# Patient Record
Sex: MALE | Race: OTHER | NOT HISPANIC OR LATINO
[De-identification: names, ages, dates, MRNs, and addresses within clinical notes are randomized per-mention and may not be internally consistent; named-entity substitution may affect disease eponyms.]

---

## 2017-01-03 ENCOUNTER — RX RENEWAL (OUTPATIENT)
Age: 62
End: 2017-01-03

## 2017-01-03 ENCOUNTER — APPOINTMENT (OUTPATIENT)
Age: 62
End: 2017-01-03

## 2017-01-03 ENCOUNTER — APPOINTMENT (OUTPATIENT)
Dept: HEART AND VASCULAR | Facility: CLINIC | Age: 62
End: 2017-01-03

## 2017-01-03 VITALS
HEART RATE: 68 BPM | SYSTOLIC BLOOD PRESSURE: 110 MMHG | BODY MASS INDEX: 24.14 KG/M2 | HEIGHT: 69 IN | DIASTOLIC BLOOD PRESSURE: 76 MMHG | WEIGHT: 163 LBS

## 2017-04-11 ENCOUNTER — APPOINTMENT (OUTPATIENT)
Dept: HEART AND VASCULAR | Facility: CLINIC | Age: 62
End: 2017-04-11

## 2017-06-16 ENCOUNTER — APPOINTMENT (OUTPATIENT)
Dept: HEART AND VASCULAR | Facility: CLINIC | Age: 62
End: 2017-06-16

## 2017-06-16 VITALS
HEIGHT: 69 IN | SYSTOLIC BLOOD PRESSURE: 135 MMHG | HEART RATE: 80 BPM | WEIGHT: 163 LBS | DIASTOLIC BLOOD PRESSURE: 80 MMHG | BODY MASS INDEX: 24.14 KG/M2

## 2018-02-06 ENCOUNTER — APPOINTMENT (OUTPATIENT)
Dept: HEART AND VASCULAR | Facility: CLINIC | Age: 63
End: 2018-02-06
Payer: COMMERCIAL

## 2018-02-06 VITALS
HEART RATE: 84 BPM | SYSTOLIC BLOOD PRESSURE: 150 MMHG | WEIGHT: 168 LBS | BODY MASS INDEX: 24.88 KG/M2 | DIASTOLIC BLOOD PRESSURE: 94 MMHG | HEIGHT: 69 IN

## 2018-02-06 PROCEDURE — 93284 PRGRMG EVAL IMPLANTABLE DFB: CPT

## 2018-08-27 ENCOUNTER — APPOINTMENT (OUTPATIENT)
Dept: HEART AND VASCULAR | Facility: CLINIC | Age: 63
End: 2018-08-27
Payer: COMMERCIAL

## 2018-08-27 VITALS
DIASTOLIC BLOOD PRESSURE: 65 MMHG | SYSTOLIC BLOOD PRESSURE: 106 MMHG | BODY MASS INDEX: 24.88 KG/M2 | HEART RATE: 68 BPM | WEIGHT: 168 LBS | HEIGHT: 69 IN

## 2018-08-27 PROCEDURE — 93284 PRGRMG EVAL IMPLANTABLE DFB: CPT

## 2019-03-05 ENCOUNTER — APPOINTMENT (OUTPATIENT)
Dept: HEART AND VASCULAR | Facility: CLINIC | Age: 64
End: 2019-03-05
Payer: COMMERCIAL

## 2019-03-05 VITALS
HEIGHT: 69 IN | BODY MASS INDEX: 25.18 KG/M2 | DIASTOLIC BLOOD PRESSURE: 72 MMHG | SYSTOLIC BLOOD PRESSURE: 107 MMHG | WEIGHT: 170 LBS | HEART RATE: 82 BPM

## 2019-03-05 PROCEDURE — 99212 OFFICE O/P EST SF 10 MIN: CPT | Mod: 25

## 2019-03-05 PROCEDURE — 93284 PRGRMG EVAL IMPLANTABLE DFB: CPT

## 2019-03-05 NOTE — HISTORY OF PRESENT ILLNESS
[de-identified] : 64 yo man with complete heart block S/P PPM implant 3/2006. He had "heart attacks" in the interim with angiography at [facility] without obstructive disease. EF is in the 30% range by ECHO. The device reached ART sometime before January 2016 based upon available ECG and at present was not able to be interrogated; there is no evidence for pacemaker function on ECG. He had a ventricular escape in the 30's.  He underwent upgrade of his pacemaker to a BiV ICD for primary prevention given his low EF from ischemic/non-ischemic cardiomyopathy and need for 100% ventricular pacing. Left arm venogram performed showed patent left upper extremity venous system.\par \par He presents for routine device check today. He states he feels well and offers no device related complaints. Stopped drinking two weeks ago, prior to that 4 beers/day.  Reports his general cardiologist, Dr. Farooq recently stopped his ACE-I.\par

## 2019-03-05 NOTE — PHYSICAL EXAM
[General Appearance - Well Developed] : well developed [Normal Appearance] : normal appearance [Well Groomed] : well groomed [General Appearance - Well Nourished] : well nourished [No Deformities] : no deformities [General Appearance - In No Acute Distress] : no acute distress [Heart Rate And Rhythm] : heart rate and rhythm were normal [Heart Sounds] : normal S1 and S2 [Murmurs] : no murmurs present [Respiration, Rhythm And Depth] : normal respiratory rhythm and effort [Exaggerated Use Of Accessory Muscles For Inspiration] : no accessory muscle use [Auscultation Breath Sounds / Voice Sounds] : lungs were clear to auscultation bilaterally [Left Infraclavicular] : left infraclavicular area [Clean] : clean [Dry] : dry [Well-Healed] : well-healed [Palpable Crepitus] : no palpable crepitus [Bleeding] : no active bleeding [Foul Odor] : no foul smell [Purulent Drainage] : no purulent drainage [Serosanguineous Drainage] : no serosanquineous drainage [Serous Drainage] : no serous drainage [Erythema] : not erythematous [Warm] : not warm [Tender] : not tender [Indurated] : not indurated [Fluctuant] : not fluctuant [Abdomen Soft] : soft [Abdomen Tenderness] : non-tender [Abdomen Mass (___ Cm)] : no abdominal mass palpated [Nail Clubbing] : no clubbing of the fingernails [Cyanosis, Localized] : no localized cyanosis [Petechial Hemorrhages (___cm)] : no petechial hemorrhages [] : no ischemic changes

## 2019-03-05 NOTE — PROCEDURE
[CRT-D] : Cardiac resynchronization therapy defibrillator [Medtronic] : Medtronic [DDD] : DDD [Normal] : The battery status is normal. [Threshold Testing Performed] : Threshold testing was performed [Sensing Amplitude ___mv] : sensing amplitude was [unfilled] mv [Lead Imp:  ___ohms] : lead impedance was [unfilled] ohms [___V @] : [unfilled] V [___ ms] : [unfilled] ms [None] : none [de-identified] : ventricular rate ~ 30 bpm  [de-identified] : [facility] [de-identified] : Viva Quad XT CRT-D [de-identified] : [id number] [de-identified] : 9/28/16 [de-identified] :  [de-identified] : 5.2 years to ART  [de-identified] : BP 98.9%\par Brief episodes of PAT\par No V events

## 2019-09-10 ENCOUNTER — APPOINTMENT (OUTPATIENT)
Dept: HEART AND VASCULAR | Facility: CLINIC | Age: 64
End: 2019-09-10

## 2022-04-13 VITALS
RESPIRATION RATE: 15 BRPM | WEIGHT: 164.02 LBS | OXYGEN SATURATION: 95 % | SYSTOLIC BLOOD PRESSURE: 109 MMHG | DIASTOLIC BLOOD PRESSURE: 65 MMHG | HEIGHT: 70 IN | TEMPERATURE: 98 F | HEART RATE: 66 BPM

## 2022-04-13 NOTE — H&P ADULT - NSHPSOCIALHISTORY_GEN_ALL_CORE
Has been drinking 4 beers/day since teenager  Former smoker, quit in 2006 Has been drinking 4 beers/day since teenager, quit last year  Former smoker, smoked 1-2 cig once in a while x few years, quit in 2006 Patient previously consumed 4 beers/day since being a teenager, quit last year. Patient is a former smoker, quit in 2006.

## 2022-04-13 NOTE — H&P ADULT - NSICDXPASTMEDICALHX_GEN_ALL_CORE_FT
PAST MEDICAL HISTORY:  AV block     CAD (coronary artery disease)     Chronic HFrEF (heart failure with reduced ejection fraction)     Chronic kidney disease, unspecified CKD stage     Essential hypertension     HLD (hyperlipidemia)      PAST MEDICAL HISTORY:  AV block     CAD (coronary artery disease)     Chronic HFrEF (heart failure with reduced ejection fraction)     Chronic kidney disease, unspecified CKD stage     Essential hypertension     HLD (hyperlipidemia)     Type 2 diabetes mellitus

## 2022-04-13 NOTE — H&P ADULT - ASSESSMENT
65 y/o male, prior alcohol abuse, w/ PMHx HTN, HLD, type II DM, chronic HFrEF (EF 25-30% by Echo), heart block s/p PPM (in 2006, s/p upgrade to BiV-ICD 09/2016 w/ Dr. Hess, goes to [facility] for interrogations, last interrogation 2020), CAD s/p MI x 2 (in 2008 and 2011 at [facility] [place], medically managed and denies prior stents), remote h/o paroxysmal Afib (was previously on Xarelto but discontinued), CKD (baseline Cr unknown), and gout (experiences occasional flares w/ diuretics) who presents for cardiac catheterization w/ possible intervention if clinically indicated in light of patient's risk factors, CCS Class III anginal/anginal equivalent symptoms, and abnormal NST results.     EKG showed paced at 65 bpm. Labs significant for Hgb 17.4, . Patient reported taking Aspirin 81 mg daily at home, compliant w/ medication at home, and last dose on 04/18/2022 AM. Patient was ordered for  cc bolus per hydration protocol, and no further Aspirin/Plavix as discussed w/ Dr. Wagner.    				  ASA: III		Mallampati class: III	            Anginal Class: III    Sedation Plan: Moderate    Patient Is Suitable Candidate For Sedation: Yes      Risks & benefits of procedure and sedation and risks and benefits for the alternative therapy have been explained to the patient in layman’s terms including but not limited to: allergic reaction, bleeding, infection, arrhythmia, respiratory compromise, renal and vascular compromise, limb damage, MI, CVA, emergent CABG/Vascular Surgery and death. Informed consent obtained and in chart. 67 y/o male, prior alcohol abuse, w/ PMHx HTN, HLD, type II DM, chronic HFrEF (EF 25-30% by Echo), heart block s/p PPM (in 2006, s/p upgrade to BiV-ICD 09/2016 w/ Dr. Hess, goes to [facility] for interrogations, last interrogation 2020), [place] s/p [place] [facility] [place] prior st[place]), remote h/o paroxysmal Afib (was previously on Xarelto but discontinued), CKD (baseline Cr unknown), and gout (experiences occasional flares w/ diuretics) who presents for cardiac catheterization w/ possible intervention if clinically indicated in light of patient's risk factors, CCS Class III anginal/anginal equivalent symptoms, and abnormal NST results.     EKG showed paced at 65 bpm. Labs still pending. Patient reported taking Aspirin 81 mg daily at home, compliant w/ medication at home, and last dose on 04/18/2022 AM. Patient was ordered for  cc bolus per hydration protocol, and no further Aspirin/Plavix as discussed w/ Dr. Wagner.    				  ASA: III		Mallampati class: III	            Anginal Class: III    Sedation Plan: Moderate    Patient Is Suitable Candidate For Sedation: Yes      Risks & benefits of procedure and sedation and risks and benefits for the alternative therapy have been explained to the patient in layman’s terms including but not limited to: allergic reaction, bleeding, infection, arrhythmia, respiratory compromise, renal and vascular compromise, limb damage, MI, CVA, emergent CABG/Vascular Surgery and death. Informed consent obtained and in chart.

## 2022-04-13 NOTE — H&P ADULT - HISTORY OF PRESENT ILLNESS
SKELETON    Cardiologist: Dr. Wagner  Covid:  Escort:  Pharmacy:    65 yo M, everyday drinker (4 beers/day since teenager), PMHx HTN, HLD, chronic HFrEF (EF 25-30% by ECHO) , heart block s/p PPM (2006) s/p upgrade to BiVICD 9/8/16 (Dr. Hess), [place] [facility] [place] CKD (baseline unknown)  who presented to Cardiologist Dr. Wagner c/o progressively worsening HSIEH after climbing 1 flight of stairs over past 6 months. +2 pillow orthopnea. +PND.     Denies CP, dizziness, diaphoresis, palpitations, fatigue, LE edema, syncope, N/V, abdominal pain, f/c, sick contact, recent travel.    TTE 3/2022 per MD note: mild LV dilation, LVEF 25-30%, mild RV dilation, trace AI/MR. NST 3/2022 per MD note: apical  infarction with rob-infarct ischemia.     In light of pt's risk factors, CCS Anginal Equivalent Class III Sx, known h/o CAD, abnormal NST, pt referred for cardiac cath with possible intervention.  Will bring recent lab work***  Verify meds, will bring list    Cardiologist: Dr. Wagner  Covid: [employer] self swab will be done Friday 4/15/22 (daughter works at [employer], will print and bring)  Pharmacy: [facility] [phone]  Escort: wife    65 yo M, alcohol use disorder, PMHx HTN, HLD, chronic HFrEF (EF 25-30% by ECHO) , heart block s/p PPM (2006) s/p upgrade to BiVICD 9/8/16 (Dr. Hess, goes to [facility] for interrogations - last interrogation 2020), [place] [facility] [place] remote h/o paroxysmal Afib (was previously on Xarelto but discontinued), CKD (baseline unknown), gout (occasional flares with diuretics) who presented to Cardiologist Dr. Wagner c/o progressively worsening HSIEH with associated palpitations after climbing <1 flight of stairs, alleviated with rest over past 6 months but has improved since starting Lasix. Also endorses 3 pillow orthopnea, occasional PND, feeling of generalized fatigue over past 4-5 months.  Patient c/o intermittent episodes of non-radiating RSCP described as "digging feeling" occurring independent of exertion and worsened when taking deep breaths over the winter but has since resolved (last episode 3-4 weeks ago). Denies dizziness, diaphoresis, LE edema, syncope, N/V, abdominal pain, f/c, sick contact, recent travel, BRBPR, melena.  TTE 3/2022 per MD note: mild LV dilation, LVEF 25-30%, mild RV dilation, trace AI/MR. NST 3/2022 per MD note: apical  infarction with rob-infarct ischemia. In light of pt's risk factors, CCS Anginal Equivalent Class III Sx, known h/o CAD, abnormal NST, pt referred for cardiac cath with possible intervention.  Cardiologist: Dr. Wagner  Covid: Negative, in HIE   Pharmacy: [facility] [phone]  Escort: wife    67 y/o male, prior alcohol abuse, w/ PMHx HTN, HLD, type II DM, chronic HFrEF (EF 25-30% by Echo), heart block s/p PPM (in 2006, s/p upgrade to BiV-ICD 09/2016 w/ Dr. Hess, goes to [facility] for interrogations, last interrogation 2020), [place] s/p [place] [facility] [place] prior st[place]), remote h/o paroxysmal Afib (was previously on Xarelto but discontinued), CKD (baseline Cr unknown), and gout (experiences occasional flares w/ diuretics) who presented to outpatient cardiologist, Dr. Wagner, endorsing progressively worsening HSIEH w/ associated palpitations after climbing <1 flight of stairs, alleviated w/ rest over the past 6 months but has improved since starting Lasix. Patient also endorses 3 pillow orthopnea, occasional PND, and feeling of generalized fatigue over past 4-5 months. Patient had also reported intermittent episodes of non-radiating right sided chestpain described as "digging feeling" occurring independent of exertion and worsened when taking deep breaths over the winter but has since resolved (last episode being 3-4 weeks ago). Patient denies dizziness, diaphoresis, LE edema, syncope, nausea/vomiting, abdominal pain, fever/chills, sick contact, recent travel, BRBPR, melena. Echocardiogram (03/2022, per MD note) showed mild LV dilation, EF 25-30%, mild RV dilation, and trace AI/MR. NST (03/2022, per MD note) revealed apical  infarction w/ rob-infarct ischemia.    In light of patient's risk factors, CCS Class III anginal/anginal equivalent symptoms, and abnormal NST results, patient now presents for cardiac catheterization w/ possible intervention if clinically indicated.

## 2022-04-18 ENCOUNTER — INPATIENT (INPATIENT)
Facility: HOSPITAL | Age: 67
LOS: 0 days | Discharge: HOME | DRG: 247 | End: 2022-04-19
Attending: INTERNAL MEDICINE | Admitting: INTERNAL MEDICINE
Payer: MEDICAID

## 2022-04-18 DIAGNOSIS — Z95.0 PRESENCE OF CARDIAC PACEMAKER: Chronic | ICD-10-CM

## 2022-04-18 LAB
A1C WITH ESTIMATED AVERAGE GLUCOSE RESULT: 6.7 % — HIGH (ref 4–5.6)
A1C WITH ESTIMATED AVERAGE GLUCOSE RESULT: 6.9 % — HIGH (ref 4–5.6)
ALBUMIN SERPL ELPH-MCNC: 4.2 G/DL — SIGNIFICANT CHANGE UP (ref 3.3–5)
ALBUMIN SERPL ELPH-MCNC: 4.5 G/DL — SIGNIFICANT CHANGE UP (ref 3.3–5)
ALP SERPL-CCNC: 65 U/L — SIGNIFICANT CHANGE UP (ref 40–120)
ALP SERPL-CCNC: 72 U/L — SIGNIFICANT CHANGE UP (ref 40–120)
ALT FLD-CCNC: 20 U/L — SIGNIFICANT CHANGE UP (ref 10–45)
ALT FLD-CCNC: SIGNIFICANT CHANGE UP (ref 10–45)
ANION GAP SERPL CALC-SCNC: 12 MMOL/L — SIGNIFICANT CHANGE UP (ref 5–17)
ANION GAP SERPL CALC-SCNC: 15 MMOL/L — SIGNIFICANT CHANGE UP (ref 5–17)
APTT BLD: 28.3 SEC — SIGNIFICANT CHANGE UP (ref 27.5–35.5)
AST SERPL-CCNC: 26 U/L — SIGNIFICANT CHANGE UP (ref 10–40)
AST SERPL-CCNC: SIGNIFICANT CHANGE UP (ref 10–40)
BASOPHILS # BLD AUTO: 0.1 K/UL — SIGNIFICANT CHANGE UP (ref 0–0.2)
BASOPHILS NFR BLD AUTO: 1.1 % — SIGNIFICANT CHANGE UP (ref 0–2)
BILIRUB SERPL-MCNC: 1.2 MG/DL — SIGNIFICANT CHANGE UP (ref 0.2–1.2)
BILIRUB SERPL-MCNC: 1.4 MG/DL — HIGH (ref 0.2–1.2)
BUN SERPL-MCNC: 24 MG/DL — HIGH (ref 7–23)
BUN SERPL-MCNC: 25 MG/DL — HIGH (ref 7–23)
CALCIUM SERPL-MCNC: 9.6 MG/DL — SIGNIFICANT CHANGE UP (ref 8.4–10.5)
CALCIUM SERPL-MCNC: 9.8 MG/DL — SIGNIFICANT CHANGE UP (ref 8.4–10.5)
CHLORIDE SERPL-SCNC: 104 MMOL/L — SIGNIFICANT CHANGE UP (ref 96–108)
CHLORIDE SERPL-SCNC: 108 MMOL/L — SIGNIFICANT CHANGE UP (ref 96–108)
CHOLEST SERPL-MCNC: 128 MG/DL — SIGNIFICANT CHANGE UP
CHOLEST SERPL-MCNC: 142 MG/DL — SIGNIFICANT CHANGE UP
CK MB CFR SERPL CALC: 5 NG/ML — SIGNIFICANT CHANGE UP (ref 0–6.7)
CK MB CFR SERPL CALC: 5.6 NG/ML — SIGNIFICANT CHANGE UP (ref 0–6.7)
CK SERPL-CCNC: 169 U/L — SIGNIFICANT CHANGE UP (ref 30–200)
CK SERPL-CCNC: 215 U/L — HIGH (ref 30–200)
CK SERPL-CCNC: SIGNIFICANT CHANGE UP (ref 30–200)
CO2 SERPL-SCNC: 22 MMOL/L — SIGNIFICANT CHANGE UP (ref 22–31)
CO2 SERPL-SCNC: 23 MMOL/L — SIGNIFICANT CHANGE UP (ref 22–31)
CREAT SERPL-MCNC: 1.39 MG/DL — HIGH (ref 0.5–1.3)
CREAT SERPL-MCNC: 1.44 MG/DL — HIGH (ref 0.5–1.3)
EGFR: 54 ML/MIN/1.73M2 — LOW
EGFR: 57 ML/MIN/1.73M2 — LOW
EOSINOPHIL # BLD AUTO: 0.25 K/UL — SIGNIFICANT CHANGE UP (ref 0–0.5)
EOSINOPHIL NFR BLD AUTO: 2.6 % — SIGNIFICANT CHANGE UP (ref 0–6)
ESTIMATED AVERAGE GLUCOSE: 146 MG/DL — HIGH (ref 68–114)
ESTIMATED AVERAGE GLUCOSE: 151 MG/DL — HIGH (ref 68–114)
GLUCOSE BLDC GLUCOMTR-MCNC: 119 MG/DL — HIGH (ref 70–99)
GLUCOSE SERPL-MCNC: 130 MG/DL — HIGH (ref 70–99)
GLUCOSE SERPL-MCNC: 75 MG/DL — SIGNIFICANT CHANGE UP (ref 70–99)
HCT VFR BLD CALC: 52.7 % — HIGH (ref 39–50)
HDLC SERPL-MCNC: 34 MG/DL — LOW
HDLC SERPL-MCNC: 37 MG/DL — LOW
HGB BLD-MCNC: 17.4 G/DL — HIGH (ref 13–17)
IMM GRANULOCYTES NFR BLD AUTO: 0.1 % — SIGNIFICANT CHANGE UP (ref 0–1.5)
INR BLD: 0.99 — SIGNIFICANT CHANGE UP (ref 0.88–1.16)
LIPID PNL WITH DIRECT LDL SERPL: 36 MG/DL — SIGNIFICANT CHANGE UP
LIPID PNL WITH DIRECT LDL SERPL: 41 MG/DL — SIGNIFICANT CHANGE UP
LYMPHOCYTES # BLD AUTO: 3.81 K/UL — HIGH (ref 1–3.3)
LYMPHOCYTES # BLD AUTO: 40.2 % — SIGNIFICANT CHANGE UP (ref 13–44)
MCHC RBC-ENTMCNC: 30.6 PG — SIGNIFICANT CHANGE UP (ref 27–34)
MCHC RBC-ENTMCNC: 33 GM/DL — SIGNIFICANT CHANGE UP (ref 32–36)
MCV RBC AUTO: 92.8 FL — SIGNIFICANT CHANGE UP (ref 80–100)
MONOCYTES # BLD AUTO: 0.8 K/UL — SIGNIFICANT CHANGE UP (ref 0–0.9)
MONOCYTES NFR BLD AUTO: 8.4 % — SIGNIFICANT CHANGE UP (ref 2–14)
NEUTROPHILS # BLD AUTO: 4.51 K/UL — SIGNIFICANT CHANGE UP (ref 1.8–7.4)
NEUTROPHILS NFR BLD AUTO: 47.6 % — SIGNIFICANT CHANGE UP (ref 43–77)
NON HDL CHOLESTEROL: 105 MG/DL — SIGNIFICANT CHANGE UP
NON HDL CHOLESTEROL: 94 MG/DL — SIGNIFICANT CHANGE UP
NRBC # BLD: 0 /100 WBCS — SIGNIFICANT CHANGE UP (ref 0–0)
PLATELET # BLD AUTO: 198 K/UL — SIGNIFICANT CHANGE UP (ref 150–400)
POTASSIUM SERPL-MCNC: 4.7 MMOL/L — SIGNIFICANT CHANGE UP (ref 3.5–5.3)
POTASSIUM SERPL-MCNC: SIGNIFICANT CHANGE UP (ref 3.5–5.3)
POTASSIUM SERPL-SCNC: 4.7 MMOL/L — SIGNIFICANT CHANGE UP (ref 3.5–5.3)
POTASSIUM SERPL-SCNC: SIGNIFICANT CHANGE UP (ref 3.5–5.3)
PROT SERPL-MCNC: 7.3 G/DL — SIGNIFICANT CHANGE UP (ref 6–8.3)
PROT SERPL-MCNC: 7.9 G/DL — SIGNIFICANT CHANGE UP (ref 6–8.3)
PROTHROM AB SERPL-ACNC: 11.8 SEC — SIGNIFICANT CHANGE UP (ref 10.5–13.4)
RBC # BLD: 5.68 M/UL — SIGNIFICANT CHANGE UP (ref 4.2–5.8)
RBC # FLD: 15.9 % — HIGH (ref 10.3–14.5)
SODIUM SERPL-SCNC: 142 MMOL/L — SIGNIFICANT CHANGE UP (ref 135–145)
SODIUM SERPL-SCNC: 142 MMOL/L — SIGNIFICANT CHANGE UP (ref 135–145)
TRIGL SERPL-MCNC: 267 MG/DL — HIGH
TRIGL SERPL-MCNC: 347 MG/DL — HIGH
WBC # BLD: 9.48 K/UL — SIGNIFICANT CHANGE UP (ref 3.8–10.5)
WBC # FLD AUTO: 9.48 K/UL — SIGNIFICANT CHANGE UP (ref 3.8–10.5)

## 2022-04-18 PROCEDURE — 93010 ELECTROCARDIOGRAM REPORT: CPT

## 2022-04-18 RX ORDER — ACETAMINOPHEN 500 MG
650 TABLET ORAL ONCE
Refills: 0 | Status: COMPLETED | OUTPATIENT
Start: 2022-04-18 | End: 2022-04-18

## 2022-04-18 RX ORDER — SACUBITRIL AND VALSARTAN 24; 26 MG/1; MG/1
1 TABLET, FILM COATED ORAL
Refills: 0 | Status: DISCONTINUED | OUTPATIENT
Start: 2022-04-19 | End: 2022-04-19

## 2022-04-18 RX ORDER — DEXTROSE 50 % IN WATER 50 %
12.5 SYRINGE (ML) INTRAVENOUS ONCE
Refills: 0 | Status: DISCONTINUED | OUTPATIENT
Start: 2022-04-18 | End: 2022-04-19

## 2022-04-18 RX ORDER — ATORVASTATIN CALCIUM 80 MG/1
40 TABLET, FILM COATED ORAL AT BEDTIME
Refills: 0 | Status: DISCONTINUED | OUTPATIENT
Start: 2022-04-18 | End: 2022-04-19

## 2022-04-18 RX ORDER — CLOPIDOGREL BISULFATE 75 MG/1
75 TABLET, FILM COATED ORAL DAILY
Refills: 0 | Status: DISCONTINUED | OUTPATIENT
Start: 2022-04-19 | End: 2022-04-19

## 2022-04-18 RX ORDER — INSULIN LISPRO 100/ML
VIAL (ML) SUBCUTANEOUS
Refills: 0 | Status: DISCONTINUED | OUTPATIENT
Start: 2022-04-18 | End: 2022-04-19

## 2022-04-18 RX ORDER — ASPIRIN/CALCIUM CARB/MAGNESIUM 324 MG
81 TABLET ORAL DAILY
Refills: 0 | Status: DISCONTINUED | OUTPATIENT
Start: 2022-04-19 | End: 2022-04-19

## 2022-04-18 RX ORDER — CARVEDILOL PHOSPHATE 80 MG/1
1 CAPSULE, EXTENDED RELEASE ORAL
Qty: 0 | Refills: 0 | DISCHARGE

## 2022-04-18 RX ORDER — CHLORHEXIDINE GLUCONATE 213 G/1000ML
1 SOLUTION TOPICAL ONCE
Refills: 0 | Status: DISCONTINUED | OUTPATIENT
Start: 2022-04-18 | End: 2022-04-18

## 2022-04-18 RX ORDER — SACUBITRIL AND VALSARTAN 24; 26 MG/1; MG/1
1 TABLET, FILM COATED ORAL
Qty: 0 | Refills: 0 | DISCHARGE

## 2022-04-18 RX ORDER — SODIUM CHLORIDE 9 MG/ML
250 INJECTION INTRAMUSCULAR; INTRAVENOUS; SUBCUTANEOUS ONCE
Refills: 0 | Status: COMPLETED | OUTPATIENT
Start: 2022-04-18 | End: 2022-04-18

## 2022-04-18 RX ORDER — CARVEDILOL PHOSPHATE 80 MG/1
12.5 CAPSULE, EXTENDED RELEASE ORAL EVERY 12 HOURS
Refills: 0 | Status: DISCONTINUED | OUTPATIENT
Start: 2022-04-18 | End: 2022-04-19

## 2022-04-18 RX ORDER — DEXTROSE 50 % IN WATER 50 %
25 SYRINGE (ML) INTRAVENOUS ONCE
Refills: 0 | Status: DISCONTINUED | OUTPATIENT
Start: 2022-04-18 | End: 2022-04-19

## 2022-04-18 RX ORDER — ROSUVASTATIN CALCIUM 5 MG/1
1 TABLET ORAL
Qty: 0 | Refills: 0 | DISCHARGE

## 2022-04-18 RX ORDER — FUROSEMIDE 40 MG
40 TABLET ORAL DAILY
Refills: 0 | Status: DISCONTINUED | OUTPATIENT
Start: 2022-04-19 | End: 2022-04-19

## 2022-04-18 RX ADMIN — ATORVASTATIN CALCIUM 40 MILLIGRAM(S): 80 TABLET, FILM COATED ORAL at 21:29

## 2022-04-18 RX ADMIN — Medication 650 MILLIGRAM(S): at 22:30

## 2022-04-18 RX ADMIN — SODIUM CHLORIDE 250 MILLILITER(S): 9 INJECTION INTRAMUSCULAR; INTRAVENOUS; SUBCUTANEOUS at 15:52

## 2022-04-18 RX ADMIN — Medication 650 MILLIGRAM(S): at 21:29

## 2022-04-18 RX ADMIN — CARVEDILOL PHOSPHATE 12.5 MILLIGRAM(S): 80 CAPSULE, EXTENDED RELEASE ORAL at 21:29

## 2022-04-18 NOTE — PATIENT PROFILE ADULT - FALL HARM RISK - HARM RISK INTERVENTIONS

## 2022-04-19 ENCOUNTER — TRANSCRIPTION ENCOUNTER (OUTPATIENT)
Age: 67
End: 2022-04-19

## 2022-04-19 VITALS — TEMPERATURE: 98 F

## 2022-04-19 PROBLEM — N18.9 CHRONIC KIDNEY DISEASE, UNSPECIFIED: Chronic | Status: ACTIVE | Noted: 2022-04-13

## 2022-04-19 PROBLEM — E78.5 HYPERLIPIDEMIA, UNSPECIFIED: Chronic | Status: ACTIVE | Noted: 2022-04-13

## 2022-04-19 PROBLEM — I50.22 CHRONIC SYSTOLIC (CONGESTIVE) HEART FAILURE: Chronic | Status: ACTIVE | Noted: 2022-04-13

## 2022-04-19 PROBLEM — I25.10 ATHEROSCLEROTIC HEART DISEASE OF NATIVE CORONARY ARTERY WITHOUT ANGINA PECTORIS: Chronic | Status: ACTIVE | Noted: 2022-04-13

## 2022-04-19 LAB
ANION GAP SERPL CALC-SCNC: 14 MMOL/L — SIGNIFICANT CHANGE UP (ref 5–17)
BUN SERPL-MCNC: 24 MG/DL — HIGH (ref 7–23)
CALCIUM SERPL-MCNC: 9.7 MG/DL — SIGNIFICANT CHANGE UP (ref 8.4–10.5)
CHLORIDE SERPL-SCNC: 105 MMOL/L — SIGNIFICANT CHANGE UP (ref 96–108)
CO2 SERPL-SCNC: 20 MMOL/L — LOW (ref 22–31)
CREAT SERPL-MCNC: 1.26 MG/DL — SIGNIFICANT CHANGE UP (ref 0.5–1.3)
EGFR: 64 ML/MIN/1.73M2 — SIGNIFICANT CHANGE UP
GLUCOSE BLDC GLUCOMTR-MCNC: 149 MG/DL — HIGH (ref 70–99)
GLUCOSE BLDC GLUCOMTR-MCNC: 90 MG/DL — SIGNIFICANT CHANGE UP (ref 70–99)
GLUCOSE SERPL-MCNC: 144 MG/DL — HIGH (ref 70–99)
HCT VFR BLD CALC: 53.5 % — HIGH (ref 39–50)
HCV AB S/CO SERPL IA: 0.04 S/CO — SIGNIFICANT CHANGE UP
HCV AB SERPL-IMP: SIGNIFICANT CHANGE UP
HGB BLD-MCNC: 17.8 G/DL — HIGH (ref 13–17)
MAGNESIUM SERPL-MCNC: 1.9 MG/DL — SIGNIFICANT CHANGE UP (ref 1.6–2.6)
MCHC RBC-ENTMCNC: 30.7 PG — SIGNIFICANT CHANGE UP (ref 27–34)
MCHC RBC-ENTMCNC: 33.3 GM/DL — SIGNIFICANT CHANGE UP (ref 32–36)
MCV RBC AUTO: 92.4 FL — SIGNIFICANT CHANGE UP (ref 80–100)
NRBC # BLD: 0 /100 WBCS — SIGNIFICANT CHANGE UP (ref 0–0)
PLATELET # BLD AUTO: 176 K/UL — SIGNIFICANT CHANGE UP (ref 150–400)
POTASSIUM SERPL-MCNC: 4.5 MMOL/L — SIGNIFICANT CHANGE UP (ref 3.5–5.3)
POTASSIUM SERPL-SCNC: 4.5 MMOL/L — SIGNIFICANT CHANGE UP (ref 3.5–5.3)
RBC # BLD: 5.79 M/UL — SIGNIFICANT CHANGE UP (ref 4.2–5.8)
RBC # FLD: 16.2 % — HIGH (ref 10.3–14.5)
SODIUM SERPL-SCNC: 139 MMOL/L — SIGNIFICANT CHANGE UP (ref 135–145)
WBC # BLD: 8.21 K/UL — SIGNIFICANT CHANGE UP (ref 3.8–10.5)
WBC # FLD AUTO: 8.21 K/UL — SIGNIFICANT CHANGE UP (ref 3.8–10.5)

## 2022-04-19 PROCEDURE — 93005 ELECTROCARDIOGRAM TRACING: CPT

## 2022-04-19 PROCEDURE — 85730 THROMBOPLASTIN TIME PARTIAL: CPT

## 2022-04-19 PROCEDURE — C1874: CPT

## 2022-04-19 PROCEDURE — 83036 HEMOGLOBIN GLYCOSYLATED A1C: CPT

## 2022-04-19 PROCEDURE — 80053 COMPREHEN METABOLIC PANEL: CPT

## 2022-04-19 PROCEDURE — 85025 COMPLETE CBC W/AUTO DIFF WBC: CPT

## 2022-04-19 PROCEDURE — 93010 ELECTROCARDIOGRAM REPORT: CPT

## 2022-04-19 PROCEDURE — 86803 HEPATITIS C AB TEST: CPT

## 2022-04-19 PROCEDURE — C1753: CPT

## 2022-04-19 PROCEDURE — 80061 LIPID PANEL: CPT

## 2022-04-19 PROCEDURE — 99223 1ST HOSP IP/OBS HIGH 75: CPT

## 2022-04-19 PROCEDURE — C1887: CPT

## 2022-04-19 PROCEDURE — 85027 COMPLETE CBC AUTOMATED: CPT

## 2022-04-19 PROCEDURE — 82553 CREATINE MB FRACTION: CPT

## 2022-04-19 PROCEDURE — C1769: CPT

## 2022-04-19 PROCEDURE — C1725: CPT

## 2022-04-19 PROCEDURE — 83735 ASSAY OF MAGNESIUM: CPT

## 2022-04-19 PROCEDURE — C1894: CPT

## 2022-04-19 PROCEDURE — 36415 COLL VENOUS BLD VENIPUNCTURE: CPT

## 2022-04-19 PROCEDURE — 85610 PROTHROMBIN TIME: CPT

## 2022-04-19 PROCEDURE — 93284 PRGRMG EVAL IMPLANTABLE DFB: CPT | Mod: 26

## 2022-04-19 PROCEDURE — 82550 ASSAY OF CK (CPK): CPT

## 2022-04-19 PROCEDURE — 99239 HOSP IP/OBS DSCHRG MGMT >30: CPT

## 2022-04-19 PROCEDURE — 80048 BASIC METABOLIC PNL TOTAL CA: CPT

## 2022-04-19 PROCEDURE — 82962 GLUCOSE BLOOD TEST: CPT

## 2022-04-19 RX ORDER — ASPIRIN/CALCIUM CARB/MAGNESIUM 324 MG
1 TABLET ORAL
Qty: 30 | Refills: 11
Start: 2022-04-19 | End: 2023-04-13

## 2022-04-19 RX ORDER — ASPIRIN/CALCIUM CARB/MAGNESIUM 324 MG
1 TABLET ORAL
Qty: 0 | Refills: 0 | DISCHARGE

## 2022-04-19 RX ORDER — CLOPIDOGREL BISULFATE 75 MG/1
1 TABLET, FILM COATED ORAL
Qty: 30 | Refills: 11
Start: 2022-04-19 | End: 2023-04-13

## 2022-04-19 RX ORDER — MAGNESIUM OXIDE 400 MG ORAL TABLET 241.3 MG
400 TABLET ORAL ONCE
Refills: 0 | Status: COMPLETED | OUTPATIENT
Start: 2022-04-19 | End: 2022-04-19

## 2022-04-19 RX ADMIN — MAGNESIUM OXIDE 400 MG ORAL TABLET 400 MILLIGRAM(S): 241.3 TABLET ORAL at 10:10

## 2022-04-19 RX ADMIN — Medication 81 MILLIGRAM(S): at 10:10

## 2022-04-19 RX ADMIN — Medication 40 MILLIGRAM(S): at 03:17

## 2022-04-19 RX ADMIN — SACUBITRIL AND VALSARTAN 1 TABLET(S): 24; 26 TABLET, FILM COATED ORAL at 07:24

## 2022-04-19 RX ADMIN — CLOPIDOGREL BISULFATE 75 MILLIGRAM(S): 75 TABLET, FILM COATED ORAL at 10:10

## 2022-04-19 RX ADMIN — CARVEDILOL PHOSPHATE 12.5 MILLIGRAM(S): 80 CAPSULE, EXTENDED RELEASE ORAL at 07:25

## 2022-04-19 NOTE — DISCHARGE NOTE PROVIDER - PROVIDER TOKENS
PROVIDER:[TOKEN:[[id number]]] FREE:[LAST:[Bernard],FIRST:[Ari],PHONE:[phone]],FAX:[(   )    -],ADDRESS:[[phone] [facility]]]

## 2022-04-19 NOTE — DISCHARGE NOTE PROVIDER - HOSPITAL COURSE
INCOMPLETE !!     65 y/o male, prior alcohol abuse, w/ PMHx HTN, HLD, type II DM, chronic HFrEF (EF 25-30% by Echo), heart block s/p PPM (in 2006, s/p upgrade to BiV-ICD 09/2016 w/ Dr. Hess, goes to [facility] for interrogations, last interrogation 2020), CAD s/p MI x 2 (in 2008 [place] at [facility] [place] and denies prior stents), remote h/o paroxysmal Afib (was previously on Xarelto but discontinued), CKD (baseline Cr unknown), and gout (experiences occasional flares w/ diuretics) who presented to outpatient cardiologist, Dr. Wagner, endorsing progressively worsening HSIEH w/ associated palpitations after climbing <1 flight of stairs, alleviated w/ rest over the past 6 months but has improved since starting Lasix. Patient also endorses 3 pillow orthopnea, occasional PND, and feeling of generalized fatigue over past 4-5 months. Patient had also reported intermittent episodes of non-radiating right sided chestpain described as "digging feeling" occurring independent of exertion and worsened when taking deep breaths over the winter but has since resolved (last episode being 3-4 weeks ago). Patient denies dizziness, diaphoresis, LE edema, syncope, nausea/vomiting, abdominal pain, fever/chills, sick contact, recent travel, BRBPR, melena. Echocardiogram (03/2022, per MD note) showed mild LV dilation, EF 25-30%, mild RV dilation, and trace AI/MR. NST (03/2022, per MD note) revealed apical  infarction w/ rob-infarct ischemia. In light of patient's risk factors, CCS Class III anginal/anginal equivalent symptoms, and abnormal NST results, patient now presents for cardiac catheterization w/ possible intervention if clinically indicated.     Pt. s/p cardiac cath 4/18: IVUS guided BERNABE mLAD (90% Ca+), R access, EDP 23, EF 25-30% by Echo    Pt. admitted o/n for monitoring. Pt. seen and examined at bedside today am. Pt. comfortable, denies any CP, SOB, dizziness, palpitations, R radial access site stable, no bleeding and hematoma noted, R radial pulse 2 +.  VSS. Labs stable o/n. home meds reviwed with Dr. Medina and pt. to be d/c on ASA 81 mg daily, Plavix 75 mg daily, Coreg 12.5 mg BID, entresto 24-26 mg BID and lIpitor 40 mg daily.  Pt. stable to be d/c as per Dr. Medina and to f/u with Dr. Wagner at [facility] on 4/25/22.    Patient has been given appropriate discharge instructions including medication regimen, access site management and follow up. Prescriptions have been e-prescribed to patient's preferred pharmacy    Cardiac Rehab (STEMI/NSTEMI/ACS/Unstable Angina/CHF/Post PCI):            *Education on benefits of Cardiac Rehab provided to patient: Yes/No         *Referral and Prescription Given for Cardiac Rehab : Yes/No.  If No, Why Not?         *Pt given list of locations & instructed to contact their insurance company to review list of participating providers: YES    Statin Prescribed (STEMI/NSTEMI/UA &/OR PCI this admission):  Yes/No; If No, Why Not? Patient has a statin allergy    DAPT: Prescriptions for Aspirin/Plavix/Brilinta/Effient e-prescribed to patient's pharmacy Yes/No__.   67 y/o male, prior alcohol abuse, w/ PMHx HTN, HLD, type II DM, chronic HFrEF (EF 25-30% by Echo), heart block s/p PPM (in 2006, s/p upgrade to BiV-ICD 09/2016 w/ Dr. Hess, goes to [facility] for interrogations, last interrogation 2020), CAD s/p MI x 2 (in 2008 and 2011 at [facility] [place], medically managed and denies prior stents), remote h/o paroxysmal Afib (was previously on Xarelto but discontinued), CKD (baseline Cr unknown), and gout (experiences occasional flares w/ diuretics) who presented to outpatient cardiologist, Dr. Wagner, endorsing progressively worsening HSIEH w/ associated palpitations after climbing <1 flight of stairs, alleviated w/ rest over the past 6 months but has improved since starting Lasix. Patient also endorses 3 pillow orthopnea, occasional PND, and feeling of generalized fatigue over past 4-5 months. Patient had also reported intermittent episodes of non-radiating right sided chestpain described as "digging feeling" occurring independent of exertion and worsened when taking deep breaths over the winter but has since resolved (last episode being 3-4 weeks ago). Patient denies dizziness, diaphoresis, LE edema, syncope, nausea/vomiting, abdominal pain, fever/chills, sick contact, recent travel, BRBPR, melena. Echocardiogram (03/2022, per MD note) showed mild LV dilation, EF 25-30%, mild RV dilation, and trace AI/MR. NST (03/2022, per MD note) revealed apical  infarction w/ rob-infarct ischemia. In light of patient's risk factors, CCS Class III anginal/anginal equivalent symptoms, and abnormal NST results, patient now presents for cardiac catheterization w/ possible intervention if clinically indicated.     Pt. s/p cardiac cath 4/18: IVUS guided BERNABE mLAD (90% Ca+), R rad access, EDP 23, EF 25-30% by Echo. No     Pt. admitted o/n for monitoring. Pt. seen and examined at bedside today am. Pt. comfortable, denies any CP, SOB, dizziness, palpitations, R radial access site stable, no bleeding and hematoma noted, R radial pulse 2 +.  VSS. Labs stable o/n. home meds reviwed with Dr. Medina and pt. to be d/c on ASA 81 mg daily, Plavix 75 mg daily, Coreg 12.5 mg BID, entresto 24-26 mg BID and lIpitor 40 mg daily.  Pt. stable to be d/c as per Dr. Medina and to f/u with Dr. Wagner at [facility] on 4/25/22.    Patient has been given appropriate discharge instructions including medication regimen, access site management and follow up. Prescriptions have been e-prescribed to patient's preferred pharmacy    Cardiac Rehab (STEMI/NSTEMI/ACS/Unstable Angina/CHF/Post PCI):            *Education on benefits of Cardiac Rehab provided to patient: Yes/No         *Referral and Prescription Given for Cardiac Rehab : Yes/No.  If No, Why Not?         *Pt given list of locations & instructed to contact their insurance company to review list of participating providers: YES    Statin Prescribed (STEMI/NSTEMI/UA &/OR PCI this admission):  Yes/No; If No, Why Not? Patient has a statin allergy    DAPT: Prescriptions for Aspirin/Plavix/Brilinta/Effient e-prescribed to patient's pharmacy Yes/No__.   65 y/o male, prior alcohol abuse, w/ PMHx HTN, HLD, type II DM, chronic HFrEF (EF 25-30% by Echo), heart block s/p PPM (in 2006, s/p upgrade to BiV-ICD 09/2016 w/ Dr. Hess, goes to [facility] for interrogations, last interrogation 2020), CAD s/p MI x 2 (in 2008 and 2011 at [facility] [place], medically managed and denies prior stents), remote h/o paroxysmal Afib (was previously on Xarelto but discontinued), CKD (baseline Cr unknown), and gout (experiences occasional flares w/ diuretics) who presented to outpatient cardiologist, Dr. Wagner, endorsing progressively worsening HSIEH w/ associated palpitations after climbing <1 flight of stairs, alleviated w/ rest over the past 6 months but has improved since starting Lasix. Patient also endorses 3 pillow orthopnea, occasional PND, and feeling of generalized fatigue over past 4-5 months. Patient had also reported intermittent episodes of non-radiating right sided chestpain described as "digging feeling" occurring independent of exertion and worsened when taking deep breaths over the winter but has since resolved (last episode being 3-4 weeks ago). Patient denies dizziness, diaphoresis, LE edema, syncope, nausea/vomiting, abdominal pain, fever/chills, sick contact, recent travel, BRBPR, melena. Echocardiogram (03/2022, per MD note) showed mild LV dilation, EF 25-30%, mild RV dilation, and trace AI/MR. NST (03/2022, per MD note) revealed apical  infarction w/ rob-infarct ischemia. In light of patient's risk factors, CCS Class III anginal/anginal equivalent symptoms, and abnormal NST results, patient now presents for cardiac catheterization w/ possible intervention if clinically indicated.     Pt. s/p cardiac cath 4/18: IVUS guided BERNABE mLAD (90% Ca+), R rad access, EDP 23, EF 25-30% by Echo. No post cath fluids given EDP.     Pt. admitted o/n for monitoring. Pt. seen and examined at bedside today am. Pt. comfortable, denies any CP, SOB, dizziness, palpitations, R radial access site stable, no bleeding and hematoma noted, R radial pulse 2 +.  VSS. Labs stable o/n. home meds reviwed with Dr. Medina and pt. to be d/c on ASA 81 mg daily, Plavix 75 mg daily, Coreg 12.5 mg BID, entresto 24-26 mg BID and lipitor 40 mg daily.  Pt. stable to be d/c as per Dr. Medina and to f/u with Dr. Wagner at [facility] on 4/25/22.    Patient has been given appropriate discharge instructions including medication regimen, access site management and follow up. Prescriptions have been e-prescribed to patient's preferred pharmacy    Cardiac Rehab (STEMI/NSTEMI/ACS/Unstable Angina/CHF/Post PCI):            *Education on benefits of Cardiac Rehab provided to patient: Yes/No         *Referral and Prescription Given for Cardiac Rehab : Yes/No.  If No, Why Not?         *Pt given list of locations & instructed to contact their insurance company to review list of participating providers: YES    Statin Prescribed (STEMI/NSTEMI/UA &/OR PCI this admission):  Yes/No; If No, Why Not? Patient has a statin allergy    DAPT: Prescriptions for Aspirin/Plavix e-prescribed to patient's pharmacy Yes/No__.

## 2022-04-19 NOTE — DISCHARGE NOTE PROVIDER - NSDCFUADDINST_GEN_ALL_CORE_FT
•	Your procedure was done through your wrist  •	You do not need to keep this area covered and you may shower  •	Please avoid any heavy lifting  (no more than 3 to 5 lbs) or strenuous activity for five days  •	If you develop any swelling, bleeding, hardening of the skin (hematoma formation), acute pain, numbness/tingling  in your arm, please contact your doctor immediately or call our 24/7 line: [phone]

## 2022-04-19 NOTE — DISCHARGE NOTE NURSING/CASE MANAGEMENT/SOCIAL WORK - NSDCPEFALRISK_GEN_ALL_CORE
For information on Fall & Injury Prevention, visit: [email or web address] OR  [email or web address] OR  https://www.cdc.gov/steadi/patient.html

## 2022-04-19 NOTE — DISCHARGE NOTE PROVIDER - CARE PROVIDER_API CALL
Ari Wagner  CARDIOVASCULAR DISEASE  [address] [place] [address]  Phone: [phone]  Fax: [phone]  Follow Up Time:    Ari Wagner  [address] [facility], [place]  Phone: [phone]  Fax: (   )    -  Follow Up Time:

## 2022-04-19 NOTE — DISCHARGE NOTE NURSING/CASE MANAGEMENT/SOCIAL WORK - PATIENT PORTAL LINK FT
You can access the FollowMyHealth Patient Portal offered by [facility] by registering at the following website: [email or web address]. By joining [facility]’s FollowMyHealth portal, you will also be able to view your health information using other applications (apps) compatible with our system.

## 2022-04-19 NOTE — DISCHARGE NOTE PROVIDER - NSDCCPCAREPLAN_GEN_ALL_CORE_FT
PRINCIPAL DISCHARGE DIAGNOSIS  Diagnosis: CAD (coronary artery disease)  Assessment and Plan of Treatment: You underwent a cardiac catheterization 4/18/22 and the blockage in your middle Left anterior descending artery (artery in your heart) as opened with stent placement.NEVER MISS A DOSE OF ASPIRIN OR PLAVIX; IF YOU DO, YOU ARE AT RISK OF YOUR STENT CLOSING AND HAVING A HEART ATTACK. DO NOT STOP THESE TWO MEDICATIONS UNLESS INSTRUCTED TO DO SO BY YOUR CARDIOLOGIST  •	Your procedure was done through your groin or your wrist  •	You do not need to keep this area covered and you may shower  •	Please avoid any heavy lifting  (no more than 3 to 5 lbs) or strenuous activity for five days  •	If you develop any swelling, bleeding, hardening of the skin (hematoma formation), acute pain, numbness/tingling  in your arm or leg please contact your doctor immediately or call our 24/7 line: [phone]  We have provided you with a prescription for cardiac rehab which is medically supervised exercise program for your heart and has been shown to improve the quantity and quality of life of people with heart disease like yours. You should attend cardiac rehab 3 times per week for 12 weeks. We have provided you with a list of nearby facilities. Please call your insurance carrier to determine which of these facilities are covered under your plan.        SECONDARY DISCHARGE DIAGNOSES  Diagnosis: Chronic systolic congestive heart failure  Assessment and Plan of Treatment: -You have a history of weakened heart muscle called congestive heart failure.   -Please make sure you follow up with your cardiologist within one week of discharge.   -Please weigh yourself daily: if you have gained more than 2-3 lbs in one day or 5 lbs in one week contact your doctor immediately as you may be retaining water weight  -In addition, restrict your salt intake to less than 2 grams a day  -If you develop worsening shortening of breath, leg swelling, fatigue, chest pain, difficulty sleeping at night due to shortness of breath, contact your cardiologist immediately.  - Please continue to take home meds as directed.      Diagnosis: HTN (hypertension)  Assessment and Plan of Treatment: Hypertension, commonly called high blood pressure, is when the force of blood pumping through your arteries is too strong. Hypertension forces your heart to work harder to pump blood. Your arteries may become narrow or stiff. Having untreated or uncontrolled hypertension for a long period of time can cause heart attack, stroke, kidney disease, and other problems. If started on a medication, take exactly as prescribed by your health care professional. Maintain a healthy lifestyle and follow up with your primary care physician.  - Please continue to take home meds as directed.    Diagnosis: HLD (hyperlipidemia)  Assessment and Plan of Treatment: Please continue to take home Lipitor 40 mg daily.    Diagnosis: DM (diabetes mellitus)  Assessment and Plan of Treatment: Please continue to take home meds as directed and follow up with your PCP/endocrinologist as directed.     PRINCIPAL DISCHARGE DIAGNOSIS  Diagnosis: CAD (coronary artery disease)  Assessment and Plan of Treatment: You underwent a cardiac catheterization 4/18/22 and the blockage in your middle Left anterior descending artery (artery in your heart) as opened with stent placement.NEVER MISS A DOSE OF ASPIRIN OR PLAVIX; IF YOU DO, YOU ARE AT RISK OF YOUR STENT CLOSING AND HAVING A HEART ATTACK. DO NOT STOP THESE TWO MEDICATIONS UNLESS INSTRUCTED TO DO SO BY YOUR CARDIOLOGIST  •	Your procedure was done through your groin or your wrist  •	You do not need to keep this area covered and you may shower  •	Please avoid any heavy lifting  (no more than 3 to 5 lbs) or strenuous activity for five days  •	If you develop any swelling, bleeding, hardening of the skin (hematoma formation), acute pain, numbness/tingling  in your arm or leg please contact your doctor immediately or call our 24/7 line: [phone]  We have provided you with a prescription for cardiac rehab which is medically supervised exercise program for your heart and has been shown to improve the quantity and quality of life of people with heart disease like yours. You should attend cardiac rehab 3 times per week for 12 weeks. We have provided you with a list of nearby facilities. Please call your insurance carrier to determine which of these facilities are covered under your plan.        SECONDARY DISCHARGE DIAGNOSES  Diagnosis: Chronic systolic congestive heart failure  Assessment and Plan of Treatment: -You have a history of weakened heart muscle called congestive heart failure.   -Please make sure you follow up with your cardiologist within one week of discharge.   -Please weigh yourself daily: if you have gained more than 2-3 lbs in one day or 5 lbs in one week contact your doctor immediately as you may be retaining water weight  -In addition, restrict your salt intake to less than 2 grams a day  -If you develop worsening shortening of breath, leg swelling, fatigue, chest pain, difficulty sleeping at night due to shortness of breath, contact your cardiologist immediately.  - Please continue to take home meds as directed.      Diagnosis: HTN (hypertension)  Assessment and Plan of Treatment: Hypertension, commonly called high blood pressure, is when the force of blood pumping through your arteries is too strong. Hypertension forces your heart to work harder to pump blood. Your arteries may become narrow or stiff. Having untreated or uncontrolled hypertension for a long period of time can cause heart attack, stroke, kidney disease, and other problems. If started on a medication, take exactly as prescribed by your health care professional. Maintain a healthy lifestyle and follow up with your primary care physician.  - Please continue to take home meds as directed.    Diagnosis: HLD (hyperlipidemia)  Assessment and Plan of Treatment: Please continue to take home Lipitor 40 mg daily.    Diagnosis: DM (diabetes mellitus)  Assessment and Plan of Treatment: Please continue to take home meds as directed and follow up with your PCP/endocrinologist as directed.    Diagnosis: Atrial tachycardia  Assessment and Plan of Treatment: You had your pacemaker/ICD interrogated by the electrophysiology team while you were here. They found that you have a fast heart rate at times. This is not urgent, but should be followed up. You can follow up with Dr Hess in 1-2 weeks.   If you would like to continue all of your care with Dr Hess, you should ask your previous electrophysiologist to release your heart monitor that you have at home, so Dr Hess's office can receive the information.

## 2022-04-19 NOTE — PROGRESS NOTE ADULT - SUBJECTIVE AND OBJECTIVE BOX
EPS Device interrogation    Indication: Pt known to Dr. Hess in the past, last seen in 3/2019. He states he lost insurance so didn't f/u with us since 2019, but now has insurance.   Briefly, pt had a dual chamber PPM implanted originally at another hospital in 3/2006 for CHB.  He presented to [facility] in 9/2016 with CHB with escape ~30 bpm and device already at EOL.  Given low LVEF, his device was upgraded to a BiV-ICD at the time of battery change on 9/28/2016.    He presented to [facility] this admission with angina and underwent cardiac cath that showed 90% mLAD lesion, which was stented on 4/18/22.  EF 25-30% on echo.   EPS is asked to interrogate his device prior to discharge home.   Pt admits to an episode of lightheadedness on 2/13/22 at which time he did have a sustained VT episode (~9 sec at 231 bpm) treated successfully with one round of ATP.  No syncope. He states occasionally has palpitations but not severe.       Device model: 	Medtronic BiV-ICD (Viva Quad XT CRT-D) 			    Implanting Physician: DR. Hess     Functioning Mode: DDD 60 - 120 bpm. 	    Underlying Rhythm: currently slow atrial tach (110 bpm) with CHB (no ventricular escape > 35 bpm).     Pacemaker dependency: Yes   AP 7.5%    97.8%     Battery status: 20 months left     Lead testing results:   RA lead:    Sense: 4.1     mV.              Threshold: 0.75  V at  0.4  ms.           Impedance: 475      ohms  RV lead:    Sense:  >20    mV.              Threshold:  0.625    V at0.4    ms.           Impedance:  399     ohms  LV lead:    Threshold: 1.375  V at 0.4 ms.           Impedance:  855     ohms    Shock coil Impedance: 70 ohms     Observations:  Normal BiV-ICD function. Adequate BIV pacing.   He has paroxysmal slow atrial tachy (~110 bpm) with BiV tracking. Occasionally when the AT > 120 bpm (the upper track rate) the device doesn't track it and would pace at lower pacing rate. NO afib.    One episode of MMVT on 2/13/22 treated successfully with ATP  He is going home on coreg 12.5 bid, entresto, DAPT.  Given the angioplasty yesterday and the AT is not too fast, would have him follow up with DR. Hess outpatient.  Would not do eps/ablation at the time being.  He was given appt to see DR. Hess on 5/20 at 11:30 AM.   He has Remote Monitor at home but the monitor is enrolled another clinic.  If he wishes to continue EP care with us, he would need to call that other clinic to transfer Remote monitor service to our clinic.    Lastly, he is aware that his device has about 20 months battery left. I stressed the importance of continuing f/u with EP given CHB and pacing dependent status.

## 2022-04-19 NOTE — DISCHARGE NOTE PROVIDER - NSDCMRMEDTOKEN_GEN_ALL_CORE_FT
Aspirin Enteric Coated 81 mg oral delayed release tablet: 1 tab(s) orally once a day  Coreg 12.5 mg oral tablet: 1 tab(s) orally 2 times a day  Entresto 24 mg-26 mg oral tablet: 1 tab(s) orally 2 times a day  glimepiride 1 mg oral tablet: 1 tab(s) orally once a day  Lasix 40 mg oral tablet: 1 tab(s) orally once a day  Lipitor 40 mg oral tablet: 1 tab(s) orally once a day  Vitamin D3 1250 mcg (50,000 intl units) oral capsule: 1 cap(s) orally once a week   Aspirin Enteric Coated 81 mg oral delayed release tablet: 1 tab(s) orally once a day  cardiac rehabilitation: Cardiac rehabilitation 3 times weekly x 3 months  clopidogrel 75 mg oral tablet: 1 tab(s) orally once a day  Coreg 12.5 mg oral tablet: 1 tab(s) orally 2 times a day  Entresto 24 mg-26 mg oral tablet: 1 tab(s) orally 2 times a day  glimepiride 1 mg oral tablet: 1 tab(s) orally once a day  Lasix 40 mg oral tablet: 1 tab(s) orally once a day  Lipitor 40 mg oral tablet: 1 tab(s) orally once a day  Vitamin D3 1250 mcg (50,000 intl units) oral capsule: 1 cap(s) orally once a week

## 2022-04-25 DIAGNOSIS — I13.0 HYPERTENSIVE HEART AND CHRONIC KIDNEY DISEASE WITH HEART FAILURE AND STAGE 1 THROUGH STAGE 4 CHRONIC KIDNEY DISEASE, OR UNSPECIFIED CHRONIC KIDNEY DISEASE: ICD-10-CM

## 2022-04-25 DIAGNOSIS — I47.1 SUPRAVENTRICULAR TACHYCARDIA: ICD-10-CM

## 2022-04-25 DIAGNOSIS — N18.9 CHRONIC KIDNEY DISEASE, UNSPECIFIED: ICD-10-CM

## 2022-04-25 DIAGNOSIS — I25.10 ATHEROSCLEROTIC HEART DISEASE OF NATIVE CORONARY ARTERY WITHOUT ANGINA PECTORIS: ICD-10-CM

## 2022-04-25 DIAGNOSIS — Z95.810 PRESENCE OF AUTOMATIC (IMPLANTABLE) CARDIAC DEFIBRILLATOR: ICD-10-CM

## 2022-04-25 DIAGNOSIS — E11.22 TYPE 2 DIABETES MELLITUS WITH DIABETIC CHRONIC KIDNEY DISEASE: ICD-10-CM

## 2022-04-25 DIAGNOSIS — E78.5 HYPERLIPIDEMIA, UNSPECIFIED: ICD-10-CM

## 2022-04-25 DIAGNOSIS — I25.2 OLD MYOCARDIAL INFARCTION: ICD-10-CM

## 2022-04-25 DIAGNOSIS — M10.9 GOUT, UNSPECIFIED: ICD-10-CM

## 2022-04-25 DIAGNOSIS — I50.22 CHRONIC SYSTOLIC (CONGESTIVE) HEART FAILURE: ICD-10-CM

## 2022-04-25 DIAGNOSIS — Z79.82 LONG TERM (CURRENT) USE OF ASPIRIN: ICD-10-CM

## 2022-04-25 DIAGNOSIS — I25.119 ATHEROSCLEROTIC HEART DISEASE OF NATIVE CORONARY ARTERY WITH UNSPECIFIED ANGINA PECTORIS: ICD-10-CM

## 2022-04-25 DIAGNOSIS — I45.89 OTHER SPECIFIED CONDUCTION DISORDERS: ICD-10-CM

## 2022-05-20 ENCOUNTER — APPOINTMENT (OUTPATIENT)
Dept: HEART AND VASCULAR | Facility: CLINIC | Age: 67
End: 2022-05-20
Payer: MEDICAID

## 2022-05-20 ENCOUNTER — INPATIENT (INPATIENT)
Facility: HOSPITAL | Age: 67
LOS: 5 days | Discharge: ANOTHER HOSPITAL | DRG: 273 | End: 2022-05-26
Attending: HOSPITALIST | Admitting: HOSPITALIST
Payer: MEDICAID

## 2022-05-20 VITALS
HEART RATE: 87 BPM | WEIGHT: 164.91 LBS | OXYGEN SATURATION: 97 % | DIASTOLIC BLOOD PRESSURE: 76 MMHG | SYSTOLIC BLOOD PRESSURE: 106 MMHG | HEIGHT: 70 IN | TEMPERATURE: 98 F | RESPIRATION RATE: 16 BRPM

## 2022-05-20 VITALS
WEIGHT: 165 LBS | HEART RATE: 100 BPM | DIASTOLIC BLOOD PRESSURE: 74 MMHG | BODY MASS INDEX: 24.44 KG/M2 | SYSTOLIC BLOOD PRESSURE: 109 MMHG | HEIGHT: 69 IN | TEMPERATURE: 97.7 F

## 2022-05-20 DIAGNOSIS — Z95.0 PRESENCE OF CARDIAC PACEMAKER: Chronic | ICD-10-CM

## 2022-05-20 DIAGNOSIS — I47.2 VENTRICULAR TACHYCARDIA: ICD-10-CM

## 2022-05-20 DIAGNOSIS — I25.10 ATHEROSCLEROTIC HEART DISEASE OF NATIVE CORONARY ARTERY WITHOUT ANGINA PECTORIS: ICD-10-CM

## 2022-05-20 DIAGNOSIS — I50.22 CHRONIC SYSTOLIC (CONGESTIVE) HEART FAILURE: ICD-10-CM

## 2022-05-20 PROBLEM — E11.9 TYPE 2 DIABETES MELLITUS WITHOUT COMPLICATIONS: Chronic | Status: ACTIVE | Noted: 2022-04-18

## 2022-05-20 LAB
A1C WITH ESTIMATED AVERAGE GLUCOSE RESULT: 6.2 % — HIGH (ref 4–5.6)
ALBUMIN SERPL ELPH-MCNC: 4.2 G/DL — SIGNIFICANT CHANGE UP (ref 3.3–5)
ALP SERPL-CCNC: 78 U/L — SIGNIFICANT CHANGE UP (ref 40–120)
ALT FLD-CCNC: 13 U/L — SIGNIFICANT CHANGE UP (ref 10–45)
ANION GAP SERPL CALC-SCNC: 13 MMOL/L — SIGNIFICANT CHANGE UP (ref 5–17)
APTT BLD: 29.7 SEC — SIGNIFICANT CHANGE UP (ref 27.5–35.5)
AST SERPL-CCNC: 19 U/L — SIGNIFICANT CHANGE UP (ref 10–40)
BASOPHILS # BLD AUTO: 0.08 K/UL — SIGNIFICANT CHANGE UP (ref 0–0.2)
BASOPHILS NFR BLD AUTO: 0.9 % — SIGNIFICANT CHANGE UP (ref 0–2)
BILIRUB SERPL-MCNC: 1.8 MG/DL — HIGH (ref 0.2–1.2)
BUN SERPL-MCNC: 27 MG/DL — HIGH (ref 7–23)
CALCIUM SERPL-MCNC: 9.6 MG/DL — SIGNIFICANT CHANGE UP (ref 8.4–10.5)
CHLORIDE SERPL-SCNC: 100 MMOL/L — SIGNIFICANT CHANGE UP (ref 96–108)
CHOLEST SERPL-MCNC: 159 MG/DL — SIGNIFICANT CHANGE UP
CK MB CFR SERPL CALC: 4 NG/ML — SIGNIFICANT CHANGE UP (ref 0–6.7)
CK MB CFR SERPL CALC: 4.5 NG/ML — SIGNIFICANT CHANGE UP (ref 0–6.7)
CK SERPL-CCNC: 113 U/L — SIGNIFICANT CHANGE UP (ref 30–200)
CK SERPL-CCNC: 135 U/L — SIGNIFICANT CHANGE UP (ref 30–200)
CO2 SERPL-SCNC: 26 MMOL/L — SIGNIFICANT CHANGE UP (ref 22–31)
CREAT SERPL-MCNC: 1.57 MG/DL — HIGH (ref 0.5–1.3)
EGFR: 49 ML/MIN/1.73M2 — LOW
EOSINOPHIL # BLD AUTO: 0.24 K/UL — SIGNIFICANT CHANGE UP (ref 0–0.5)
EOSINOPHIL NFR BLD AUTO: 2.8 % — SIGNIFICANT CHANGE UP (ref 0–6)
ESTIMATED AVERAGE GLUCOSE: 131 MG/DL — HIGH (ref 68–114)
GLUCOSE BLDC GLUCOMTR-MCNC: 114 MG/DL — HIGH (ref 70–99)
GLUCOSE SERPL-MCNC: 119 MG/DL — HIGH (ref 70–99)
HCT VFR BLD CALC: 51.9 % — HIGH (ref 39–50)
HDLC SERPL-MCNC: 29 MG/DL — LOW
HGB BLD-MCNC: 17.2 G/DL — HIGH (ref 13–17)
IMM GRANULOCYTES NFR BLD AUTO: 0.3 % — SIGNIFICANT CHANGE UP (ref 0–1.5)
INR BLD: 1.03 — SIGNIFICANT CHANGE UP (ref 0.88–1.16)
LIPID PNL WITH DIRECT LDL SERPL: 84 MG/DL — SIGNIFICANT CHANGE UP
LYMPHOCYTES # BLD AUTO: 2.87 K/UL — SIGNIFICANT CHANGE UP (ref 1–3.3)
LYMPHOCYTES # BLD AUTO: 33.4 % — SIGNIFICANT CHANGE UP (ref 13–44)
MCHC RBC-ENTMCNC: 30.3 PG — SIGNIFICANT CHANGE UP (ref 27–34)
MCHC RBC-ENTMCNC: 33.1 GM/DL — SIGNIFICANT CHANGE UP (ref 32–36)
MCV RBC AUTO: 91.5 FL — SIGNIFICANT CHANGE UP (ref 80–100)
MONOCYTES # BLD AUTO: 0.55 K/UL — SIGNIFICANT CHANGE UP (ref 0–0.9)
MONOCYTES NFR BLD AUTO: 6.4 % — SIGNIFICANT CHANGE UP (ref 2–14)
NEUTROPHILS # BLD AUTO: 4.82 K/UL — SIGNIFICANT CHANGE UP (ref 1.8–7.4)
NEUTROPHILS NFR BLD AUTO: 56.2 % — SIGNIFICANT CHANGE UP (ref 43–77)
NON HDL CHOLESTEROL: 130 MG/DL — HIGH
NRBC # BLD: 0 /100 WBCS — SIGNIFICANT CHANGE UP (ref 0–0)
PLATELET # BLD AUTO: 232 K/UL — SIGNIFICANT CHANGE UP (ref 150–400)
POTASSIUM SERPL-MCNC: 4.5 MMOL/L — SIGNIFICANT CHANGE UP (ref 3.5–5.3)
POTASSIUM SERPL-SCNC: 4.5 MMOL/L — SIGNIFICANT CHANGE UP (ref 3.5–5.3)
PROT SERPL-MCNC: 7.8 G/DL — SIGNIFICANT CHANGE UP (ref 6–8.3)
PROTHROM AB SERPL-ACNC: 12.3 SEC — SIGNIFICANT CHANGE UP (ref 10.5–13.4)
RBC # BLD: 5.67 M/UL — SIGNIFICANT CHANGE UP (ref 4.2–5.8)
RBC # FLD: 15.5 % — HIGH (ref 10.3–14.5)
SARS-COV-2 RNA SPEC QL NAA+PROBE: NEGATIVE — SIGNIFICANT CHANGE UP
SODIUM SERPL-SCNC: 139 MMOL/L — SIGNIFICANT CHANGE UP (ref 135–145)
TRIGL SERPL-MCNC: 231 MG/DL — HIGH
TROPONIN T SERPL-MCNC: 0.04 NG/ML — CRITICAL HIGH (ref 0–0.01)
TROPONIN T SERPL-MCNC: 0.04 NG/ML — CRITICAL HIGH (ref 0–0.01)
TSH SERPL-MCNC: 2.25 UIU/ML — SIGNIFICANT CHANGE UP (ref 0.27–4.2)
WBC # BLD: 8.59 K/UL — SIGNIFICANT CHANGE UP (ref 3.8–10.5)
WBC # FLD AUTO: 8.59 K/UL — SIGNIFICANT CHANGE UP (ref 3.8–10.5)

## 2022-05-20 PROCEDURE — 99214 OFFICE O/P EST MOD 30 MIN: CPT | Mod: 25

## 2022-05-20 PROCEDURE — 71045 X-RAY EXAM CHEST 1 VIEW: CPT | Mod: 26

## 2022-05-20 PROCEDURE — 93290 INTERROG DEV EVAL ICPMS IP: CPT | Mod: 26

## 2022-05-20 PROCEDURE — 93284 PRGRMG EVAL IMPLANTABLE DFB: CPT

## 2022-05-20 PROCEDURE — 93010 ELECTROCARDIOGRAM REPORT: CPT

## 2022-05-20 PROCEDURE — 99285 EMERGENCY DEPT VISIT HI MDM: CPT

## 2022-05-20 RX ORDER — PANTOPRAZOLE SODIUM 20 MG/1
40 TABLET, DELAYED RELEASE ORAL
Refills: 0 | Status: DISCONTINUED | OUTPATIENT
Start: 2022-05-20 | End: 2022-05-26

## 2022-05-20 RX ORDER — AMIODARONE HYDROCHLORIDE 400 MG/1
400 TABLET ORAL EVERY 12 HOURS
Refills: 0 | Status: DISCONTINUED | OUTPATIENT
Start: 2022-05-20 | End: 2022-05-26

## 2022-05-20 RX ORDER — CARVEDILOL PHOSPHATE 80 MG/1
12.5 CAPSULE, EXTENDED RELEASE ORAL EVERY 12 HOURS
Refills: 0 | Status: DISCONTINUED | OUTPATIENT
Start: 2022-05-20 | End: 2022-05-22

## 2022-05-20 RX ORDER — CLOPIDOGREL BISULFATE 75 MG/1
75 TABLET, FILM COATED ORAL DAILY
Refills: 0 | Status: DISCONTINUED | OUTPATIENT
Start: 2022-05-21 | End: 2022-05-26

## 2022-05-20 RX ORDER — CARVEDILOL PHOSPHATE 80 MG/1
1 CAPSULE, EXTENDED RELEASE ORAL
Qty: 0 | Refills: 0 | DISCHARGE

## 2022-05-20 RX ORDER — AMIODARONE HYDROCHLORIDE 400 MG/1
150 TABLET ORAL ONCE
Refills: 0 | Status: DISCONTINUED | OUTPATIENT
Start: 2022-05-20 | End: 2022-05-20

## 2022-05-20 RX ORDER — HEPARIN SODIUM 5000 [USP'U]/ML
5000 INJECTION INTRAVENOUS; SUBCUTANEOUS EVERY 8 HOURS
Refills: 0 | Status: DISCONTINUED | OUTPATIENT
Start: 2022-05-20 | End: 2022-05-26

## 2022-05-20 RX ORDER — ATORVASTATIN CALCIUM 80 MG/1
40 TABLET, FILM COATED ORAL AT BEDTIME
Refills: 0 | Status: DISCONTINUED | OUTPATIENT
Start: 2022-05-20 | End: 2022-05-21

## 2022-05-20 RX ORDER — SACUBITRIL AND VALSARTAN 24; 26 MG/1; MG/1
1 TABLET, FILM COATED ORAL
Qty: 0 | Refills: 0 | DISCHARGE

## 2022-05-20 RX ORDER — ASPIRIN/CALCIUM CARB/MAGNESIUM 324 MG
81 TABLET ORAL DAILY
Refills: 0 | Status: DISCONTINUED | OUTPATIENT
Start: 2022-05-21 | End: 2022-05-26

## 2022-05-20 RX ORDER — ATORVASTATIN CALCIUM 80 MG/1
1 TABLET, FILM COATED ORAL
Qty: 0 | Refills: 0 | DISCHARGE

## 2022-05-20 RX ADMIN — ATORVASTATIN CALCIUM 40 MILLIGRAM(S): 80 TABLET, FILM COATED ORAL at 21:20

## 2022-05-20 RX ADMIN — AMIODARONE HYDROCHLORIDE 400 MILLIGRAM(S): 400 TABLET ORAL at 16:23

## 2022-05-20 RX ADMIN — CARVEDILOL PHOSPHATE 12.5 MILLIGRAM(S): 80 CAPSULE, EXTENDED RELEASE ORAL at 18:05

## 2022-05-20 RX ADMIN — HEPARIN SODIUM 5000 UNIT(S): 5000 INJECTION INTRAVENOUS; SUBCUTANEOUS at 21:23

## 2022-05-20 RX ADMIN — Medication 40 MILLIGRAM(S): at 21:20

## 2022-05-20 NOTE — H&P ADULT - ASSESSMENT
66 Y M with PMH HTN, HLD, DM II, HFrEF (EF 25-30% by ECHO), complete heart block (S/P PPM implant 3/2006, s/p upgrade to BiV-ICD 09/2016), CAD (s/p BERNABE x1 mLAD @[facility] 4/18/22) who referred from Dr Hess’s office endorsing intermittent episodes of near syncope with palpitations and SOB since his last cath. Interrogation showing back-to-back episodes of VT @ 200+ bpm all terminating with ATP. Since last cath pt has had 41 VT episodes (all falling into VF zone).  Labs significant for trop T 0.04, Cr 1.57, COVID Negative.  CXR: ED read cardiomegaly without congestion.  EKG paced with frequent PVC’s.  Pt admitted to cardiac tele with plan for cardiac cath, initiation of amiodarone and possible EPS/VT ablation next week    66 Y M with PMH HTN, HLD, DM II, HFrEF (EF 25-30% by ECHO), complete heart block (S/P PPM implant 3/2006, s/p upgrade to BiV-ICD 09/2016), CAD (s/p BERNABE x1 mLAD @[facility] 4/18/22) who referred from Dr Hess’s office endorsing intermittent episodes of near syncope with palpitations and SOB since his last cath. Interrogation showing back-to-back episodes of VT @ 200+ bpm all terminating with ATP. Since last cath pt has had 41 VT episodes (all falling into VF zone).  Labs significant for trop T 0.04, Cr 1.57, COVID Negative.  CXR: ED read cardiomegaly without congestion.  EKG paced with frequent PVC’s.  Pt admitted to cardiac tele with plan for cardiac cath, initiation of amiodarone and possible EPS/VT ablation next week       Risks & benefits of procedure and alternative therapy have been explained to the patient including but not limited to: allergic reaction, bleeding w/possible need for blood transfusion, infection, renal and vascular compromise, limb damage, arrhythmia, stroke, vessel dissection/perforation, Myocardial infarction, emergent CABG. Informed consent obtained and in chart.       Suitable for moderate sedation.

## 2022-05-20 NOTE — H&P ADULT - NSHPSOCIALHISTORY_GEN_ALL_CORE
Patient previously consumed 4 beers/day since being a teenager, quit last year. Patient is a former smoker, quit in 2006.

## 2022-05-20 NOTE — ED ADULT NURSE NOTE - OBJECTIVE STATEMENT
patient arrived to ED for further evaluation of dizziness, shortness of breath x 1 week. per patient, was at MD office getting pacemaker interrogated and was sent to ED for evaluation. denies fever, cough, nausea or vomiting. endorses intermittent shortness of breath. patient speaking in full sentences, on room air. patient endorses recent cardiac stent placement. patient placed on cardiac monitoring. ekg done.

## 2022-05-20 NOTE — H&P ADULT - PROBLEM SELECTOR PLAN 1
Interrogation of ICD @Dr Wilson's office 5/20: back-to-back episodes of VT @ 200+ bpm all terminating with ATP. Since last cath pt has had 41 VT episodes (all falling into VF zone)  -Normal device function. BIV pacing 97%. No programming changes made  -EP following and recommended starting amiodarone 400 mg BID and cardiac cath prior to EPS/vtach ablation (next week)  -c/w home coreg 12.5 mg BID

## 2022-05-20 NOTE — H&P ADULT - HISTORY OF PRESENT ILLNESS
66 Y M with PMH HTN, HLD, DM II, HFrEF (EF 30%), complete heart block (S/P PPM implant 3/2006, s/p upgrade to BiV-ICD 09/2016), CAD (s/p BERNABE x1 mLAD @[facility] 4/18/22) who referred from Dr Hess’s office endorsing episodes of near syncope over the last week and palpitations. Interrogation showing back-to-back episodes of VT @ 200+ bpm all terminating with ATP. since last cath pt has had 41 VT episodes (all falling into VF zone).    Pt denies CP, SOB, orthopnea, PND, LE edema, syncope, diaphoresis, fever, chills, N/V/D, sick contact or recent travel.   Compliance with DAPT????  In ED T 97.8, HR 87, /76, RR16, O2 97%. Labs significant for trop T 0.04, Cr 1.57, COVID Negative.   CXR: cardiomegaly without congestion.  EKG paced with frequent PVC’s   Pt admitted to cardiac tele with plan for cardiac cath, initiation of amiodarone and possible EPS/VT ablation next week    66 Y M with PMH HTN, HLD, DM II, HFrEF (EF 25-30% by ECHO), complete heart block (S/P PPM implant 3/2006, s/p upgrade to BiV-ICD 09/2016), CAD (s/p BERNABE x1 mLAD @[facility] 4/18/22) who referred from Dr Hess’s office endorsing episodes of near syncope over the last week and palpitations. Interrogation showing back-to-back episodes of VT @ 200+ bpm all terminating with ATP. Since last cath pt has had 41 VT episodes (all falling into VF zone).    Pt denies CP, SOB, orthopnea, PND, LE edema, syncope, diaphoresis, fever, chills, N/V/D, sick contact or recent travel.   Compliance with DAPT????  In ED T 97.8, HR 87, /76, RR16, O2 97%. Labs significant for trop T 0.04, Cr 1.57, COVID Negative.   CXR: cardiomegaly without congestion.  EKG paced with frequent PVC’s   Pt admitted to cardiac tele with plan for cardiac cath, initiation of amiodarone and possible EPS/VT ablation next week     cardiac catheterization @[facility] 4/18/22 BERNABE x1 to mLAD (90%), LCx mild disease, RCA mild disease.    Echocardiogram (03/2022, per MD note) showed mild LV dilation, EF 25-30%, mild RV dilation, and trace AI/MR.      66 Y M with PMH HTN, HLD, DM II, HFrEF (EF 25-30% by ECHO), complete heart block (S/P PPM implant 3/2006, s/p upgrade to BiV-ICD 09/2016), CAD (s/p BERNABE x1 mLAD @[facility] 4/18/22) who referred from Dr Hess’s office endorsing intermittent episodes of near syncope with palpitations and SOB since his last cath. PT also has chronic 1 pillow orthopnea and PND.  Interrogation showing back-to-back episodes of VT @ 200+ bpm all terminating with ATP. Since last cath pt has had 41 VT episodes (all falling into VF zone).  Pt denies CP, LE edema, syncope, diaphoresis, fever, chills, N/V/D, sick contact or recent travel. Pt reports compliance with DAPT (ASA/plavix).  In ED T 97.8, HR 87, /76, RR16, O2 97%. Labs significant for trop T 0.04, Cr 1.57, COVID Negative.  CXR: ED read cardiomegaly without congestion.  EKG paced with frequent PVC’s.  Pt admitted to cardiac tele with plan for cardiac cath, initiation of amiodarone and possible EPS/VT ablation next week          66 Y M with PMH HTN, HLD, DM II, HFrEF (EF 25-30% by ECHO), complete heart block (S/P PPM implant 3/2006, s/p upgrade to BiV-ICD 09/2016), CAD (s/p BERNABE x1 mLAD @[facility] 4/18/22) who referred from Dr Hess’s office endorsing intermittent episodes of near syncope with palpitations and SOB since his last cath. PT also has chronic 1 pillow orthopnea and PND.  Interrogation showing back-to-back episodes of VT @ 200+ bpm all terminating with ATP. Since last cath pt has had 41 VT episodes (all falling into VF zone).  Pt denies CP, LE edema, syncope, diaphoresis, fever, chills, N/V/D, sick contact or recent travel. Pt reports compliance with DAPT (ASA/plavix).  In ED T 97.8, HR 87, /76, RR16, O2 97%. Labs significant for trop T 0.04, Cr 1.57, COVID Negative.  CXR: ED read cardiomegaly without congestion.  EKG paced with frequent PVC’s.  Pt admitted to cardiac tele with plan for cardiac cath on Monday Dr Wagner, initiation of amiodarone and possible EPS/VT ablation next week

## 2022-05-20 NOTE — PROCEDURE
[CRT-D] : Cardiac resynchronization therapy defibrillator [Medtronic] : Medtronic [DDD] : DDD [Normal] : The battery status is normal. [Threshold Testing Performed] : Threshold testing was performed [___ ms] : [unfilled] ms [None] : none [Sensing Amplitude ___mv] : sensing amplitude was [unfilled] mv [Lead Imp:  ___ohms] : lead impedance was [unfilled] ohms [___V @] : [unfilled] V [de-identified] : ventricular rate ~ 30 bpm  [de-identified] : [facility] [de-identified] : Viva Quad XT CRT-D [de-identified] : [id number] [de-identified] : 9/28/16 [de-identified] :  [de-identified] : 19 months to ART  [de-identified] : AP 14.4%\par BP 97.2%\par 41 episodes of ATP terminated VT (falling into VF zone)

## 2022-05-20 NOTE — ED PROVIDER NOTE - OBJECTIVE STATEMENT
67 y/o M, PMHx HTN, HLD, type II DM, chronic HFrEF (EF 25-30% by Echo), heart block s/p PPM (in 2006, s/p upgrade to BiV-ICD 09/2016 w/ Dr. Hess, goes to [facility] for interrogations, last interrogation 2020), CAD s/[place] [facility] [place] prior st[place]), remote h/o paroxysmal Afib (was previously on Xarelto but discontinued), CKD (baseline Cr unknown), and gout (experiences occasional flares w/ diuretics), recent ED visit for CAD s/p stent placement, BERNABE mLAD on 4/18, now presenting with intermittent episodes of SOB, dizziness, and HA. Pt states episodes with sxs last 2 secs at time and resolving. Pt also reports mild tightness that is intermittent and also occurs when he is SOB. Pt had his ICD interrogated today and was found to have episode of Vtach on Sunday and Tuesday. Pt was then referred to Ed for admission. Pt is currently asymptomatic.     Denies fever, chills, urinary sxs, peripheral edema, cough, and n/v. 65 y/o M, PMHx HTN, HLD, type II DM, chronic HFrEF (EF 25-30% by Echo), heart block s/p PPM (in 2006, s/p upgrade to BiV-ICD 09/2016 w/ Dr. Hess, CAD s/p MI x 2 (in 2008 and 2011, remote h/o paroxysmal Afib (was previously on Xarelto but discontinued), CKD (baseline Cr unknown), gout, s/p BERNABE mLAD on 4/18, now presenting with intermittent episodes of SOB, dizziness, chest tightness and palp. Pt states episodes last few secs at time and resolves spontaneously. Pt had his ICD interrogated today and was found to have multiple episodes of Vtach. Pt was then referred to Ed for admission. Pt is currently asymptomatic.     Denies fever, chills, urinary sxs, peripheral edema, cough, and n/v.

## 2022-05-20 NOTE — ED ADULT NURSE NOTE - CHIEF COMPLAINT QUOTE
Pt has hx of pacemaker and ICD, reports intermittent lightheadedness, palpitations, chest pain. Also had cardiac stent placed 4/18. Pt followed up with Dr. Hess today, reports "runs of vtach on Sunday and Tuesday" and was told to come to ED for admission.

## 2022-05-20 NOTE — REVIEW OF SYSTEMS
[Fever] : no fever [Headache] : no headache [Chills] : no chills [Feeling Fatigued] : feeling fatigued [Blurry Vision] : no blurred vision [Seeing Double (Diplopia)] : no diplopia [Dyspnea on exertion] : dyspnea during exertion [Chest Discomfort] : no chest discomfort [Palpitations] : palpitations [Orthopnea] : no orthopnea [Syncope] : no syncope [Cough] : no cough [Wheezing] : no wheezing [Negative] : Neurological

## 2022-05-20 NOTE — H&P ADULT - NSHPLABSRESULTS_GEN_ALL_CORE
17.2   8.59  )-----------( 232      ( 20 May 2022 14:17 )             51.9       05-20    139  |  100  |  27<H>  ----------------------------<  119<H>  4.5   |  26  |  1.57<H>    Ca    9.6      20 May 2022 14:17  Mg     2.2     05-20    TPro  7.8  /  Alb  4.2  /  TBili  1.8<H>  /  DBili  x   /  AST  19  /  ALT  13  /  AlkPhos  78  05-20      PT/INR - ( 20 May 2022 14:17 )   PT: 12.3 sec;   INR: 1.03          PTT - ( 20 May 2022 14:17 )  PTT:29.7 sec    CARDIAC MARKERS ( 20 May 2022 14:17 )  x     / 0.04 ng/mL / x     / x     / x                EKG:

## 2022-05-20 NOTE — H&P ADULT - PROBLEM SELECTOR PLAN 3
Euvolemic on exam  -EF 25-30% by cath 3/2022 (per MD note)  -holding home lasix 40 mg PRN 2/2 JAGRUTI ,access fluid status in AM  -Core measures  -strict I&O's, daily weight, fluid restriction   -possible initiation of ACEI/ARB post cath    DVT F: Oral intake  E: Replete electrolytes as needed for K<4 and Mg<2  N: DASH Diet    DVT PPX:   Dispo: pending further evaluation     Case discussed w/ Dr. Steen Euvolemic on exam  -EF 25-30% by cath 3/2022 (per MD note)  -home med: lasix 40 mg PRN (takes when feels SOB)   -holding diuretics 2/2 JAGRUTI on arrival, re-access fluid status in AM  -Core measures  -strict I&O's, daily weight, fluid restriction   -possible initiation of ACEI/ARB post cath pending renal function     DVT F: Oral intake  E: Replete electrolytes as needed for K<4 and Mg<2  N: DASH Diet    DVT PPX: SQH  Dispo: pending further evaluation     Case discussed w/ Dr. Steen

## 2022-05-20 NOTE — HISTORY OF PRESENT ILLNESS
[de-identified] : 65 yo man with complete heart block S/P PPM implant 3/2006. He had "heart attacks" in the interim with angiography at [facility] without obstructive disease. EF is in the 30% range by ECHO. The device reached ART sometime before January 2016 based upon available ECG and at present was not able to be interrogated; there is no evidence for pacemaker function on ECG. He had a ventricular escape in the 30's.  He underwent upgrade of his pacemaker to a BiV ICD for primary prevention given his low EF from ischemic/non-ischemic cardiomyopathy and need for 100% ventricular pacing. Left arm venogram performed showed patent left upper extremity venous system.\par \par Last visit to our office was 5/2019. Since that visit he had significant symptoms of c/p, fatigue, dacosta and orthopnea. He underwent NST (3/2022) which revealed apical infarction with rob-infarct ischemia. He then underwent cardiac catheterization (4/18/22) and was found to have 90% obstruction to mLAD s/p BERNABE. EF 25-30%. \par \par He presents today for routine evaluation. He describes episodes of near syncope over the last week and feeling of his heart "beating out my chest." \par

## 2022-05-20 NOTE — ED PROVIDER NOTE - CLINICAL SUMMARY MEDICAL DECISION MAKING FREE TEXT BOX
Impression: SOB and chest pain over past mo. with ICD interrogation showing signs of VT. Pt is hemodynamically stable. Will check labs including trop., Cxr, and contact Dr. Hess to discuss admission.     Spoke with Dr. Hess. Pt has 43 runs of VT, paced out of arrhythmia since then placement 1 mo. ago. Pt has been having episodes of VT. Will admit for card cath and possible ablation on Mon/Tues. Will start him on Amiodarone. Impression: intermittent episodes of SOB and chest pain over past mo. with ICD interrogation today showing multiple runs of VT. Pt is currently asymptomatic and hemodynamically stable. Will check labs including trop., Cxr, and contact Dr. Hess to discuss admission.     Spoke with Dr. Hess. Pt had 43 runs of VT which were paced out. + significant increase in VT episodes since LAD stent placement on 4/18. Will admit for card cath today, ablation on Mon/Tues. Pt to be started on amio as inpt. Impression: s/p BERNABE to mLAD on 4/18, with intermittent episodes of SOB, chest pain, palp and dizziness ever since. s/p ICD interrogation today showing multiple runs of VT. Pt is currently asymptomatic and hemodynamically stable. Will check labs including trop., Cxr, and contact Dr. Hess to discuss admission.     Spoke with Dr. Hess. Pt had 43 runs of VT which were paced out. + significant increase in VT episodes since LAD stent placement on 4/18. Will admit for card cath today, ablation on Mon/Tues. Pt to be started on amio as inpt.

## 2022-05-20 NOTE — PROGRESS NOTE ADULT - SUBJECTIVE AND OBJECTIVE BOX
EP Clinic Note    The patient noted the following symptom(s):. 65 yo man with complete heart block S/P PPM implant 3/2006. He had "heart attacks" in the interim with angiography at [facility] without obstructive disease. EF is in the 30% range by ECHO. The device reached ART sometime before January 2016 based upon available ECG and at present was not able to be interrogated; there is no evidence for pacemaker function on ECG. He had a ventricular escape in the 30's. He underwent upgrade of his pacemaker to a BiV ICD for primary prevention given his low EF from ischemic/non-ischemic cardiomyopathy and need for 100% ventricular pacing. Left arm venogram performed showed patent left upper extremity venous system.     Last visit to our office was 5/2019. Since that visit he had significant symptoms of c/p, fatigue, dacosta and orthopnea. He underwent NST (3/2022) which revealed apical infarction with rob-infarct ischemia. He then underwent cardiac catheterization (4/18/22) and was found to have 90% obstruction to mLAD s/p BERNABE. EF 25-30%.            He presents today for routine evaluation. He describes episodes of near syncope over the last week and feeling of his heart "beating out my chest."      Current Meds     · Aspirin 81 TBEC   · Atorvastatin Calcium 40 MG Oral Tablet; TAKE 1 TABLET AT BEDTIME   · Carvedilol 12.5 MG Oral Tablet; TAKE 1 TABLET TWICE DAILY WITH MEALS   · Furosemide 40 MG Oral Tablet; TAKE 1 TABLET BY MOUTH EVERY DAY   · Plavix 75 MG Oral Tablet; TAKE 1 TABLET DAILY           Procedure     Device Check The patient is  pacemaker dependent. ventricular rate ~ 30 bpm    Device Type: Cardiac resynchronization therapy defibrillator    Pacemaker/ICD : [employer].    Model: Viva Quad XT CRT-D. Serial Number: [id number]. Date of Implant: 9/28/16.    Mode: DDD. Rate: .    Battery Status: The battery status is normal. 19 months to ART.    Measurement: Threshold testing was performed.    Atrial: lead impedance was 475 ohms. sensing amplitude was 3.6 mv. Pacing Threshold: 0.75 V @ 0.4 ms.    Rt Ventricle:. lead impedance was 399 ohms. sensing amplitude was >20 mv. Pacing Threshold: 0.750 V @ 0.4 ms.    Lt Ventricle:. lead impedance was 836 ohms. Pacing Threshold: 1.5 V @ 0.4 ms.    Program Changes: none.    Comments:. AP 14.4%   BP 97.2%     41 episodes of ATP terminated VT (falling into VF zone).      Assessment + Plan    Biventricular ICD (implantable cardioverter-defibrillator) in place (V45.02) (Z95.810)   · Normal device function as programmed. Leads with good function. He is BIV pacing       97%. No programming changes made.   Ventricular tachycardia (427.1) (I47.2)   · Multiple back-to-back episodes of VT at rates of 200+ bpm all terminating with ATP. He       had had a few episodes of VT prior to the stent placement, but since stent placement he       has had 41 VT episodes (all falling into VF zone). Denies syncope but c/o near syncope       and rapid heart action.  Concern for stent integrity. Sending to the ED for repeat cath and possible EPS/VT ablation next week. Please start AMIODARONE 400mg BID now.

## 2022-05-20 NOTE — PATIENT PROFILE ADULT - FALL HARM RISK - HARM RISK INTERVENTIONS

## 2022-05-20 NOTE — H&P ADULT - PROBLEM SELECTOR PLAN 2
Chest pain free  -trop 0.04 x1 CK and CKMB wnl, f/u CE's @ 6pm  -ECHO ordered  -ECHO (03/2022, per MD note): mild LV dilation, EF 25-30%, mild RV dilation, and trace AI/MR.   -cardiac catheterization @[facility] 4/18/22 BERNABE x1 to mLAD (90%), LCx mild disease, RCA mild disease.  -c/w aspirin 81 mg daily, plavix 75 mg daily, atorvastatin 40 mg QHS   -total 159, try 231, HDL 29, LDL 84  -tele monitoring Chest pain free and complaint with DAPT since last cath 4/18/22  -trop 0.04 x1 CK and CKMB wnl, f/u CE's @ 6pm (trend to peak, no hep gtt at this time)   -ECHO ordered  -ECHO (03/2022, per MD note): mild LV dilation, EF 25-30%, mild RV dilation, and trace AI/MR.   -cardiac catheterization @[facility] 4/18/22 BERNABE x1 to mLAD (90%), LCx mild disease, RCA mild disease.  -consented for cardiac cath with Dr Wagner Monday,   -c/w aspirin 81 mg daily, plavix 75 mg daily, atorvastatin 40 mg QHS   -total 159, try 231, HDL 29, LDL 84  -tele monitoring

## 2022-05-20 NOTE — ED ADULT NURSE NOTE - NSICDXPASTMEDICALHX_GEN_ALL_CORE_FT
PAST MEDICAL HISTORY:  AV block     CAD (coronary artery disease)     Chronic HFrEF (heart failure with reduced ejection fraction)     Chronic kidney disease, unspecified CKD stage     Essential hypertension     HLD (hyperlipidemia)     Type 2 diabetes mellitus

## 2022-05-21 DIAGNOSIS — N17.9 ACUTE KIDNEY FAILURE, UNSPECIFIED: ICD-10-CM

## 2022-05-21 DIAGNOSIS — I50.23 ACUTE ON CHRONIC SYSTOLIC (CONGESTIVE) HEART FAILURE: ICD-10-CM

## 2022-05-21 DIAGNOSIS — M10.9 GOUT, UNSPECIFIED: ICD-10-CM

## 2022-05-21 DIAGNOSIS — E78.5 HYPERLIPIDEMIA, UNSPECIFIED: ICD-10-CM

## 2022-05-21 LAB
ANION GAP SERPL CALC-SCNC: 14 MMOL/L — SIGNIFICANT CHANGE UP (ref 5–17)
BUN SERPL-MCNC: 36 MG/DL — HIGH (ref 7–23)
CALCIUM SERPL-MCNC: 10 MG/DL — SIGNIFICANT CHANGE UP (ref 8.4–10.5)
CHLORIDE SERPL-SCNC: 103 MMOL/L — SIGNIFICANT CHANGE UP (ref 96–108)
CK MB CFR SERPL CALC: 3.5 NG/ML — SIGNIFICANT CHANGE UP (ref 0–6.7)
CK SERPL-CCNC: 122 U/L — SIGNIFICANT CHANGE UP (ref 30–200)
CO2 SERPL-SCNC: 19 MMOL/L — LOW (ref 22–31)
CREAT SERPL-MCNC: 1.42 MG/DL — HIGH (ref 0.5–1.3)
D DIMER BLD IA.RAPID-MCNC: 184 NG/ML DDU — SIGNIFICANT CHANGE UP
EGFR: 55 ML/MIN/1.73M2 — LOW
GLUCOSE BLDC GLUCOMTR-MCNC: 137 MG/DL — HIGH (ref 70–99)
GLUCOSE BLDC GLUCOMTR-MCNC: 142 MG/DL — HIGH (ref 70–99)
GLUCOSE BLDC GLUCOMTR-MCNC: 150 MG/DL — HIGH (ref 70–99)
GLUCOSE BLDC GLUCOMTR-MCNC: 150 MG/DL — HIGH (ref 70–99)
GLUCOSE SERPL-MCNC: 159 MG/DL — HIGH (ref 70–99)
HCT VFR BLD CALC: 49.6 % — SIGNIFICANT CHANGE UP (ref 39–50)
HGB BLD-MCNC: 16.6 G/DL — SIGNIFICANT CHANGE UP (ref 13–17)
MAGNESIUM SERPL-MCNC: 2.3 MG/DL — SIGNIFICANT CHANGE UP (ref 1.6–2.6)
MCHC RBC-ENTMCNC: 30.6 PG — SIGNIFICANT CHANGE UP (ref 27–34)
MCHC RBC-ENTMCNC: 33.5 GM/DL — SIGNIFICANT CHANGE UP (ref 32–36)
MCV RBC AUTO: 91.5 FL — SIGNIFICANT CHANGE UP (ref 80–100)
NRBC # BLD: 0 /100 WBCS — SIGNIFICANT CHANGE UP (ref 0–0)
PLATELET # BLD AUTO: 236 K/UL — SIGNIFICANT CHANGE UP (ref 150–400)
POTASSIUM SERPL-MCNC: 4.6 MMOL/L — SIGNIFICANT CHANGE UP (ref 3.5–5.3)
POTASSIUM SERPL-SCNC: 4.6 MMOL/L — SIGNIFICANT CHANGE UP (ref 3.5–5.3)
RBC # BLD: 5.42 M/UL — SIGNIFICANT CHANGE UP (ref 4.2–5.8)
RBC # FLD: 15.7 % — HIGH (ref 10.3–14.5)
SODIUM SERPL-SCNC: 136 MMOL/L — SIGNIFICANT CHANGE UP (ref 135–145)
TROPONIN T SERPL-MCNC: 0.02 NG/ML — HIGH (ref 0–0.01)
WBC # BLD: 5.28 K/UL — SIGNIFICANT CHANGE UP (ref 3.8–10.5)
WBC # FLD AUTO: 5.28 K/UL — SIGNIFICANT CHANGE UP (ref 3.8–10.5)

## 2022-05-21 PROCEDURE — 71045 X-RAY EXAM CHEST 1 VIEW: CPT | Mod: 26

## 2022-05-21 PROCEDURE — 99233 SBSQ HOSP IP/OBS HIGH 50: CPT

## 2022-05-21 PROCEDURE — 93306 TTE W/DOPPLER COMPLETE: CPT | Mod: 26

## 2022-05-21 PROCEDURE — 93010 ELECTROCARDIOGRAM REPORT: CPT

## 2022-05-21 RX ORDER — FUROSEMIDE 40 MG
40 TABLET ORAL ONCE
Refills: 0 | Status: COMPLETED | OUTPATIENT
Start: 2022-05-21 | End: 2022-05-21

## 2022-05-21 RX ORDER — SODIUM CHLORIDE 9 MG/ML
1000 INJECTION, SOLUTION INTRAVENOUS
Refills: 0 | Status: DISCONTINUED | OUTPATIENT
Start: 2022-05-21 | End: 2022-05-26

## 2022-05-21 RX ORDER — DEXTROSE 50 % IN WATER 50 %
12.5 SYRINGE (ML) INTRAVENOUS ONCE
Refills: 0 | Status: DISCONTINUED | OUTPATIENT
Start: 2022-05-21 | End: 2022-05-26

## 2022-05-21 RX ORDER — LANOLIN ALCOHOL/MO/W.PET/CERES
3 CREAM (GRAM) TOPICAL ONCE
Refills: 0 | Status: COMPLETED | OUTPATIENT
Start: 2022-05-21 | End: 2022-05-21

## 2022-05-21 RX ORDER — DEXTROSE 50 % IN WATER 50 %
25 SYRINGE (ML) INTRAVENOUS ONCE
Refills: 0 | Status: DISCONTINUED | OUTPATIENT
Start: 2022-05-21 | End: 2022-05-26

## 2022-05-21 RX ORDER — INSULIN LISPRO 100/ML
VIAL (ML) SUBCUTANEOUS
Refills: 0 | Status: DISCONTINUED | OUTPATIENT
Start: 2022-05-21 | End: 2022-05-26

## 2022-05-21 RX ORDER — FUROSEMIDE 40 MG
20 TABLET ORAL EVERY 12 HOURS
Refills: 0 | Status: DISCONTINUED | OUTPATIENT
Start: 2022-05-21 | End: 2022-05-21

## 2022-05-21 RX ORDER — ATORVASTATIN CALCIUM 80 MG/1
80 TABLET, FILM COATED ORAL AT BEDTIME
Refills: 0 | Status: DISCONTINUED | OUTPATIENT
Start: 2022-05-21 | End: 2022-05-26

## 2022-05-21 RX ORDER — FUROSEMIDE 40 MG
20 TABLET ORAL ONCE
Refills: 0 | Status: COMPLETED | OUTPATIENT
Start: 2022-05-21 | End: 2022-05-21

## 2022-05-21 RX ORDER — DEXTROSE 50 % IN WATER 50 %
15 SYRINGE (ML) INTRAVENOUS ONCE
Refills: 0 | Status: DISCONTINUED | OUTPATIENT
Start: 2022-05-21 | End: 2022-05-26

## 2022-05-21 RX ORDER — GLUCAGON INJECTION, SOLUTION 0.5 MG/.1ML
1 INJECTION, SOLUTION SUBCUTANEOUS ONCE
Refills: 0 | Status: DISCONTINUED | OUTPATIENT
Start: 2022-05-21 | End: 2022-05-26

## 2022-05-21 RX ADMIN — AMIODARONE HYDROCHLORIDE 400 MILLIGRAM(S): 400 TABLET ORAL at 18:03

## 2022-05-21 RX ADMIN — Medication 3 MILLIGRAM(S): at 00:31

## 2022-05-21 RX ADMIN — ATORVASTATIN CALCIUM 80 MILLIGRAM(S): 80 TABLET, FILM COATED ORAL at 22:54

## 2022-05-21 RX ADMIN — CLOPIDOGREL BISULFATE 75 MILLIGRAM(S): 75 TABLET, FILM COATED ORAL at 10:32

## 2022-05-21 RX ADMIN — HEPARIN SODIUM 5000 UNIT(S): 5000 INJECTION INTRAVENOUS; SUBCUTANEOUS at 22:54

## 2022-05-21 RX ADMIN — HEPARIN SODIUM 5000 UNIT(S): 5000 INJECTION INTRAVENOUS; SUBCUTANEOUS at 15:58

## 2022-05-21 RX ADMIN — PANTOPRAZOLE SODIUM 40 MILLIGRAM(S): 20 TABLET, DELAYED RELEASE ORAL at 06:56

## 2022-05-21 RX ADMIN — Medication 20 MILLIGRAM(S): at 10:33

## 2022-05-21 RX ADMIN — AMIODARONE HYDROCHLORIDE 400 MILLIGRAM(S): 400 TABLET ORAL at 06:55

## 2022-05-21 RX ADMIN — Medication 81 MILLIGRAM(S): at 10:32

## 2022-05-21 RX ADMIN — CARVEDILOL PHOSPHATE 12.5 MILLIGRAM(S): 80 CAPSULE, EXTENDED RELEASE ORAL at 18:03

## 2022-05-21 RX ADMIN — Medication 40 MILLIGRAM(S): at 15:58

## 2022-05-21 RX ADMIN — CARVEDILOL PHOSPHATE 12.5 MILLIGRAM(S): 80 CAPSULE, EXTENDED RELEASE ORAL at 06:55

## 2022-05-21 RX ADMIN — HEPARIN SODIUM 5000 UNIT(S): 5000 INJECTION INTRAVENOUS; SUBCUTANEOUS at 06:56

## 2022-05-21 NOTE — PROGRESS NOTE ADULT - NUTRITIONAL ASSESSMENT
CONCLUSIONS:     1. Severely reduced left ventricular systolic function, ejection   fraction    10-15% .   2. Abnormal septal motion seen due to right ventricular pacing.   Remaining segments demonstrate severe hypokinesis.   3. D shaped flattening of the interventricular septum due to right   ventricular pressure overload.   4. Normal right ventricular size.   5. Reduced right ventricular systolic function.   6. Dilated right atrium.   7. Mild aortic regurgitation.   8. Mild aortic stenosis.   9. Pulmonary hypertension present, pulmonary artery systolic pressure is   51 mmHg.  10. Trivial pericardial effusion without echocardiographic evidence of   cardiac tamponade physiology.  11. Mildly dilated ascending aorta.  12. Compared to the previous TTE performed on 9/27/2016, the left   ventricular ejection fraction is lower and right ventricular function is   now reduced.

## 2022-05-21 NOTE — PROGRESS NOTE ADULT - PROBLEM SELECTOR PLAN 6
- c/w Ecotrin 81 mg daily, Plavix 75 mg daily, Atorvastatin 40 mg QHS   - Total 159, try 231, HDL 29, LDL 84    DVT F: Oral intake  E: Replete electrolytes as needed for K<4 and Mg<2  N: DASH Diet    DVT PPX: SQH  Dispo: pending cath Monday    Case discussed w/ Dr. Medina - , , HDL 29, LDL 84  - Home Atorvastatin increased to 80 mg PO qHS    DVT F: Oral intake  E: Replete electrolytes as needed for K<4 and Mg<2  N: DASH Diet    DVT PPX: SQH  Dispo: pending cath Monday    Case discussed w/ Dr. Medina - , , HDL 29, LDL 84  - Home Atorvastatin increased to 80 mg PO qHS    DVT F: Oral intake  E: Replete electrolytes as needed for K<4 and Mg<2  N: DASH Diet    DVT PPX: SQH  Dispo: pending cath Monday    Case discussed w/ Dr. Medina, Dr. Kaufman

## 2022-05-21 NOTE — PROGRESS NOTE ADULT - PROBLEM SELECTOR PLAN 2
Chest pain free and complaint with DAPT since last cath 4/18/22  -trop 0.04 x1 CK and CKMB wnl, f/u CE's @ 6pm (trend to peak, no hep gtt at this time)   -ECHO ordered  -ECHO (03/2022, per MD note): mild LV dilation, EF 25-30%, mild RV dilation, and trace AI/MR.   -cardiac catheterization @[facility] 4/18/22 BERNABE x1 to mLAD (90%), LCx mild disease, RCA mild disease.  -consented for cardiac cath with Dr Wagner Monday,   -c/w aspirin 81 mg daily, plavix 75 mg daily, atorvastatin 40 mg QHS   -total 159, try 231, HDL 29, LDL 84  -tele monitoring Chest pain free and complaint with DAPT since last cath 4/18/22  - Trop flat 0.04 x2 CK  - ECHO ordered  -ECHO (03/2022, per MD note): mild LV dilation, EF 25-30%, mild RV dilation, and trace AI/MR.   -cardiac catheterization @[facility] 4/18/22 BERNABE x1 to mLAD (90%), LCx mild disease, RCA mild disease.  -consented for cardiac cath with Dr Wagner Monday,   -c/w aspirin 81 mg daily, plavix 75 mg daily, atorvastatin 40 mg QHS   -total 159, try 231, HDL 29, LDL 84  -tele monitoring Chest pain free and complaint with DAPT since last cath 4/18/22  - Trop flat 0.04 x2 CK  - ECHO (03/2022, per MD note): mild LV dilation, EF 25-30%, mild RV dilation, trace AI/MR.   - ECHO 5/21/22: LVEF 10-15%, abnormal septal motion seen due to RV pacing, remaining segments demonstrate severe hypokinesis, D- shaped flattening of the interventricular septum due to RV pressure overload, normal RV size, reduced RVSF, dilated RA, mild AI/AS, pulm HTN (PASP 51 mmHg), mildly dilated ascending aorta, based on TTE 2016 the LVEF is lower is RV function is now reduced.   - Cardiac catheterization @[facility] 4/18/22 BERNABE x1 to mLAD (90%), LCx mild disease, RCA mild disease.  - Plan for cardiac cath with Dr. Wagner on Monday, consented  - c/w Ecotrin 81 mg PO QD, Plavix 75 mg PO QD, Atorvastatin increased to 80 mg QHS Chest pain free and complaint with DAPT since last cath 4/18/22  - Trop flat 0.04 x2 CK  - Cardiac cath @[facility] 4/18/22: mLAD 90% s/p BERNABE, LCx mild disease, RCA mild disease.  - ECHO (03/2022, per MD note): mild LV dilation, EF 25-30%, mild RV dilation, trace AI/MR.   - ECHO 5/21/22: LVEF 10-15%, abnormal septal motion seen due to RV pacing, remaining segments demonstrate severe hypokinesis, D- shaped flattening of the interventricular septum due to RV pressure overload, normal RV size, reduced RVSF, dilated RA, mild AI/AS, pulm HTN (PASP 51 mmHg), mildly dilated ascending aorta, based on TTE 2016 the LVEF is lower is RV function is now reduced.   - f/u 4pm d-dimer given VT with reduced RVSF/pulm HTN.  - Plan for cardiac cath with Dr. Wagner on Monday, consented  - c/w Ecotrin 81 mg PO QD, Plavix 75 mg PO QD, Atorvastatin increased to 80 mg QHS Chest pain free and complaint with DAPT since last cath 4/18/22  - Trop flat 0.04 x2 CK, D-dimer normal -184  - Cardiac cath @[facility] 4/18/22: mLAD 90% s/p BERNABE, LCx mild disease, RCA mild disease.  - ECHO (03/2022, per MD note): mild LV dilation, EF 25-30%, mild RV dilation, trace AI/MR.   - ECHO 5/21/22: LVEF 10-15%, abnormal septal motion seen due to RV pacing, remaining segments demonstrate severe hypokinesis, D- shaped flattening of the interventricular septum due to RV pressure overload, normal RV size, reduced RVSF, dilated RA, mild AI/AS, pulm HTN (PASP 51 mmHg), mildly dilated ascending aorta, based on TTE 2016 the LVEF is lower is RV function is now reduced.   - Plan for cardiac cath with Dr. Wagner on Monday, consented  - c/w Ecotrin 81 mg PO QD, Plavix 75 mg PO QD, Atorvastatin increased to 80 mg QHS

## 2022-05-21 NOTE — PROGRESS NOTE ADULT - PROBLEM SELECTOR PLAN 4
- c/w Ecotrin 81 mg daily, Plavix 75 mg daily, Atorvastatin 40 mg QHS   -total 159, try 231, HDL 29, LDL 84    DVT F: Oral intake  E: Replete electrolytes as needed for K<4 and Mg<2  N: DASH Diet    DVT PPX: SQH  Dispo: pending cath Monday    Case discussed w/ Dr. Medina JAGRUTI on CKD II, baseline Cr ~1.3  - Cr uptrended to 1.57 and downtrended to 1.42 today, possible cardiorenal  - Avoid nephrotoxic agents, NSAIDs. Renally dose meds

## 2022-05-21 NOTE — PROGRESS NOTE ADULT - PROBLEM SELECTOR PLAN 3
Euvolemic on exam  -EF 25-30% by cath 3/2022 (per MD note)  -home med: lasix 40 mg PRN (takes when feels SOB)   -holding diuretics 2/2 JAGRUTI on arrival, re-access fluid status in AM  -Core measures  -strict I&O's, daily weight, fluid restriction   -possible initiation of ACEI/ARB post cath pending renal function     DVT F: Oral intake  E: Replete electrolytes as needed for K<4 and Mg<2  N: DASH Diet    DVT PPX: SQH  Dispo: pending further evaluation     Case discussed w/ Dr. Steen Bibasilar rales, +JVD, saturating well on RA, appears overloaded  - ECHO 5/21/22 with LVEF 10-15% (reduced from 25-30% 3/2022),   - Takes Lasix 40 mg PO PRN (SOB), c/w Lasix 20 mg IV BID  - Possible initiation of ACEI/ARB post cath pending renal function   - c/w Coreg 12.5 mg PO BID  - Core measures, 1.5L fluid restriction, strict I&Os, daily weights Bibasilar rales, +JVD, saturating well on RA  - ECHO 5/21/22 with LVEF 10-15% (reduced from 25-30% 3/2022),   - Takes Lasix 40 mg PO PRN (SOB), s/p Lasix 20 mg IVP x1 5/21 AM, s/p Lasix 40 mg IVP x1 5/21 PM  - f/u volume status in AM and order diuretics if needed.  - Possible initiation of ACEI/ARB post cath pending renal function   - c/w Coreg 12.5 mg PO BID  - Core measures, 1.5L fluid restriction, strict I&Os, daily weights Bibasilar rales, +JVD, saturating well on RA  - ECHO 5/21/22 with LVEF 10-15% (reduced from 25-30% 3/2022),   - Takes Lasix 40 mg PO PRN (SOB), s/p Lasix 20 mg IVP x1 5/21 AM, s/p Lasix 40 mg IVP x1 5/21 PM  - f/u volume status in AM and order diuretics if needed.  - States Dr. Perry discontinued Entresto 24-26 mg PO BID when he was feeling lightheaded. Discuss re-initiation of Entrestro 24-26 mg PO BID with Dr. Medina for GDMT.  - c/w Coreg 12.5 mg PO BID  - Core measures, 1.5L fluid restriction, strict I&Os, daily weights

## 2022-05-21 NOTE — PROGRESS NOTE ADULT - ASSESSMENT
66 Y M with PMH HTN, HLD, DM II, HFrEF (EF 25-30% by ECHO), complete heart block (S/P PPM implant 3/2006, s/p upgrade to BiV-ICD 09/2016), CAD (s/p BERNABE x1 mLAD @[facility] 4/18/22) who referred from Dr Hess’s office endorsing intermittent episodes of near syncope with palpitations and SOB since his last cath. Interrogation showing back-to-back episodes of VT @ 200+ bpm all terminating with ATP. Since last cath pt has had 41 VT episodes (all falling into VF zone).  Labs significant for trop T 0.04, Cr 1.57, COVID Negative.  CXR: ED read cardiomegaly without congestion.  EKG paced with frequent PVC’s.  Pt admitted to cardiac tele with plan for cardiac cath, initiation of amiodarone and possible EPS/VT ablation next week       Risks & benefits of procedure and alternative therapy have been explained to the patient including but not limited to: allergic reaction, bleeding w/possible need for blood transfusion, infection, renal and vascular compromise, limb damage, arrhythmia, stroke, vessel dissection/perforation, Myocardial infarction, emergent CABG. Informed consent obtained and in chart.       Suitable for moderate sedation.  66 Y M with PMH HTN, HLD, DM II, chronic HFrEF (EF 25-30% by ECHO), complete heart block (S/P PPM implant 3/2006, s/p upgrade to BiV-ICD 09/2016), CAD (s/p BERNABE x1 mLAD [facility] 4/18/22) who referred from Dr Hess’s office endorsing intermittent episodes of near syncope with palpitations and SOB since his last cath. Interrogation showing back-to-back episodes of VT @ 200+ bpm all terminating with ATP. Since last cath pt has had 41 VT episodes (all falling into VF zone). Amiodarone load initiated. Admitted for acute on chronic HFrEF exacerbation s/p IV diuresis with plan for cardiac cath on Monday followed by possible EPS/VT ablation if cath unremarkable 66 Y M with PMH HTN, HLD, DM II, chronic HFrEF (EF 25-30% by ECHO), complete heart block (S/P PPM implant 3/2006, s/p upgrade to BiV-ICD 09/2016), CAD (s/p BERNABE x1 mLAD @[facility] 4/18/22), CKD II (baseline Ce ~1.3) who referred from Dr Hess’s office endorsing intermittent episodes of near syncope with palpitations and SOB since his last cath. Interrogation showing back-to-back episodes of VT @ 200+ bpm all terminating with ATP. Since last cath pt has had 41 VT episodes (all falling into VF zone). Amiodarone load initiated. Admitted for acute on chronic HFrEF exacerbation s/p IV diuresis with plan for cardiac cath on Monday followed by possible EPS/VT ablation if cath unremarkable.  Hospital course also c/b acute gout flare, s/p Prednisone 40 mg PO x1 with resolution.

## 2022-05-21 NOTE — PROGRESS NOTE ADULT - SUBJECTIVE AND OBJECTIVE BOX
Interventional Cardiology PA Adult Progress Note    Subjective Assessment:  Pt seen and evaluated at bedside this AM.   	  MEDICATIONS:  aMIOdarone    Tablet 400 milliGRAM(s) Oral every 12 hours  carvedilol 12.5 milliGRAM(s) Oral every 12 hours  pantoprazole    Tablet 40 milliGRAM(s) Oral before breakfast  atorvastatin 40 milliGRAM(s) Oral at bedtime  dextrose 50% Injectable 25 Gram(s) IV Push once  dextrose 50% Injectable 12.5 Gram(s) IV Push once  dextrose 50% Injectable 25 Gram(s) IV Push once  dextrose Oral Gel 15 Gram(s) Oral once PRN  glucagon  Injectable 1 milliGRAM(s) IntraMuscular once  insulin lispro (ADMELOG) corrective regimen sliding scale   SubCutaneous Before meals and at bedtime  aspirin enteric coated 81 milliGRAM(s) Oral daily  clopidogrel Tablet 75 milliGRAM(s) Oral daily  dextrose 5%. 1000 milliLiter(s) IV Continuous <Continuous>  dextrose 5%. 1000 milliLiter(s) IV Continuous <Continuous>  heparin   Injectable 5000 Unit(s) SubCutaneous every 8 hours      [PHYSICAL EXAM:  TELEMETRY:  T(C): 36.6 (05-21-22 @ 13:20), Max: 36.8 (05-20-22 @ 15:45)  HR: 100 (05-21-22 @ 12:12) (64 - 100)  BP: 126/84 (05-21-22 @ 12:12) (90/66 - 126/84)  RR: 18 (05-21-22 @ 12:12) (16 - 20)  SpO2: 98% (05-21-22 @ 12:12) (95% - 100%)  Wt(kg): --  I&O's Summary    20 May 2022 07:01  -  21 May 2022 07:00  --------------------------------------------------------  IN: 280 mL / OUT: 500 mL / NET: -220 mL    21 May 2022 07:01  -  21 May 2022 13:38  --------------------------------------------------------  IN: 0 mL / OUT: 300 mL / NET: -300 mL      Height (cm): 177.8 (05-20 @ 13:48)  Weight (kg): 74.8 (05-20 @ 13:48)  BMI (kg/m2): 23.7 (05-20 @ 13:48)  BSA (m2): 1.92 (05-20 @ 13:48)  Hernandez:  Central/PICC/Mid Line:                                         Appearance: Normal	  HEENT:   Normal oral mucosa, PERRL, EOMI	  Neck: Supple, + JVD/ - JVD; Carotid Bruit   Cardiovascular: Normal S1 S2, No JVD, No murmurs,   Respiratory: Lungs clear to auscultation/Decreased Breath Sounds/No Rales, Rhonchi, Wheezing	  Gastrointestinal:  Soft, Non-tender, + BS	  Skin: No rashes, No ecchymoses, No cyanosis  Extremities: Normal range of motion, No clubbing, cyanosis or edema  Vascular: Peripheral pulses palpable 2+ bilaterally  Neurologic: Non-focal  Psychiatry: A & O x 3, Mood & affect appropriate                        16.6   5.28  )-----------( 236      ( 21 May 2022 06:59 )             49.6     05-21    136  |  103  |  36<H>  ----------------------------<  159<H>  4.6   |  19<L>  |  1.42<H>    Ca    10.0      21 May 2022 06:59  Mg     2.3     05-21    TPro  7.8  /  Alb  4.2  /  TBili  1.8<H>  /  DBili  x   /  AST  19  /  ALT  13  /  AlkPhos  78  05-20  TSH: Thyroid Stimulating Hormone, Serum: 2.250 uIU/mL (05-20 @ 14:17)  PT/INR - ( 20 May 2022 14:17 )   PT: 12.3 sec;   INR: 1.03     PTT - ( 20 May 2022 14:17 )  PTT:29.7 sec   Interventional Cardiology PA Adult Progress Note    Subjective Assessment:  Pt seen and evaluated at bedside this AM.  Endorses HSIEH on 3 blocks & orthopnea over past few weeks. Denies CP, dizziness, palpations currently. All other ROS otherwise negative except per subjective.   	  MEDICATIONS:  aMIOdarone    Tablet 400 milliGRAM(s) Oral every 12 hours  carvedilol 12.5 milliGRAM(s) Oral every 12 hours  pantoprazole    Tablet 40 milliGRAM(s) Oral before breakfast  atorvastatin 40 milliGRAM(s) Oral at bedtime  dextrose 50% Injectable 25 Gram(s) IV Push once  dextrose 50% Injectable 12.5 Gram(s) IV Push once  dextrose 50% Injectable 25 Gram(s) IV Push once  dextrose Oral Gel 15 Gram(s) Oral once PRN  glucagon  Injectable 1 milliGRAM(s) IntraMuscular once  insulin lispro (ADMELOG) corrective regimen sliding scale   SubCutaneous Before meals and at bedtime  aspirin enteric coated 81 milliGRAM(s) Oral daily  clopidogrel Tablet 75 milliGRAM(s) Oral daily  dextrose 5%. 1000 milliLiter(s) IV Continuous <Continuous>  dextrose 5%. 1000 milliLiter(s) IV Continuous <Continuous>  heparin   Injectable 5000 Unit(s) SubCutaneous every 8 hours      [PHYSICAL EXAM:  TELEMETRY:  T(C): 36.6 (05-21-22 @ 13:20), Max: 36.8 (05-20-22 @ 15:45)  HR: 100 (05-21-22 @ 12:12) (64 - 100)  BP: 126/84 (05-21-22 @ 12:12) (90/66 - 126/84)  RR: 18 (05-21-22 @ 12:12) (16 - 20)  SpO2: 98% (05-21-22 @ 12:12) (95% - 100%)  Wt(kg): --  I&O's Summary    20 May 2022 07:01  -  21 May 2022 07:00  --------------------------------------------------------  IN: 280 mL / OUT: 500 mL / NET: -220 mL    21 May 2022 07:01  -  21 May 2022 13:38  --------------------------------------------------------  IN: 0 mL / OUT: 300 mL / NET: -300 mL      Height (cm): 177.8 (05-20 @ 13:48)  Weight (kg): 74.8 (05-20 @ 13:48)  BMI (kg/m2): 23.7 (05-20 @ 13:48)  BSA (m2): 1.92 (05-20 @ 13:48)  Hernandez:  Central/PICC/Mid Line:                                         Appearance: Laying in hospital bed in NAD with pacer pads in place	  HEENT:  EOMI	  Neck: + mild JVD  Cardiovascular: Normal S1 S2,  No murmurs  Respiratory: Bibasilar rales  Gastrointestinal:  Soft, Non-tender, + BSx4	  Skin: No rashes, No ecchymoses, No cyanosis  Extremities: No pedal edema B/L  Vascular: DP 2+ B/L, PT 1+ B/L  Neurologic: Non-focal  Psychiatry: A & O x 3, Mood & affect appropriate                        16.6   5.28  )-----------( 236      ( 21 May 2022 06:59 )             49.6     05-21    136  |  103  |  36<H>  ----------------------------<  159<H>  4.6   |  19<L>  |  1.42<H>    Ca    10.0      21 May 2022 06:59  Mg     2.3     05-21    TPro  7.8  /  Alb  4.2  /  TBili  1.8<H>  /  DBili  x   /  AST  19  /  ALT  13  /  AlkPhos  78  05-20  TSH: Thyroid Stimulating Hormone, Serum: 2.250 uIU/mL (05-20 @ 14:17)  PT/INR - ( 20 May 2022 14:17 )   PT: 12.3 sec;   INR: 1.03     PTT - ( 20 May 2022 14:17 )  PTT:29.7 sec   Interventional Cardiology PA Adult Progress Note    Subjective Assessment:  Pt seen and evaluated at bedside this AM.  Endorses HSIEH on 3 blocks & orthopnea over past few weeks. Denies CP, dizziness, palpations currently. All other ROS otherwise negative except per subjective.   	  MEDICATIONS:  aMIOdarone    Tablet 400 milliGRAM(s) Oral every 12 hours  carvedilol 12.5 milliGRAM(s) Oral every 12 hours  pantoprazole    Tablet 40 milliGRAM(s) Oral before breakfast  atorvastatin 40 milliGRAM(s) Oral at bedtime  dextrose 50% Injectable 25 Gram(s) IV Push once  dextrose 50% Injectable 12.5 Gram(s) IV Push once  dextrose 50% Injectable 25 Gram(s) IV Push once  dextrose Oral Gel 15 Gram(s) Oral once PRN  glucagon  Injectable 1 milliGRAM(s) IntraMuscular once  insulin lispro (ADMELOG) corrective regimen sliding scale   SubCutaneous Before meals and at bedtime  aspirin enteric coated 81 milliGRAM(s) Oral daily  clopidogrel Tablet 75 milliGRAM(s) Oral daily  dextrose 5%. 1000 milliLiter(s) IV Continuous <Continuous>  dextrose 5%. 1000 milliLiter(s) IV Continuous <Continuous>  heparin   Injectable 5000 Unit(s) SubCutaneous every 8 hours      [PHYSICAL EXAM:  TELEMETRY:  T(C): 36.6 (05-21-22 @ 13:20), Max: 36.8 (05-20-22 @ 15:45)  HR: 100 (05-21-22 @ 12:12) (64 - 100)  BP: 126/84 (05-21-22 @ 12:12) (90/66 - 126/84)  RR: 18 (05-21-22 @ 12:12) (16 - 20)  SpO2: 98% (05-21-22 @ 12:12) (95% - 100%)  Wt(kg): --  I&O's Summary    20 May 2022 07:01  -  21 May 2022 07:00  --------------------------------------------------------  IN: 280 mL / OUT: 500 mL / NET: -220 mL    21 May 2022 07:01  -  21 May 2022 13:38  --------------------------------------------------------  IN: 0 mL / OUT: 300 mL / NET: -300 mL      Height (cm): 177.8 (05-20 @ 13:48)  Weight (kg): 74.8 (05-20 @ 13:48)  BMI (kg/m2): 23.7 (05-20 @ 13:48)  BSA (m2): 1.92 (05-20 @ 13:48)  Hernandez:  Central/PICC/Mid Line:                                         Appearance: Laying in hospital bed in NAD with pacer pads in place	  HEENT:  EOMI	  Neck: + mild JVD  Cardiovascular: Normal S1 S2,  No murmurs  Respiratory: Bibasilar rales  Gastrointestinal:  Soft, Non-tender, + BSx4	  Skin: R great toe without tenderness  Extremities: No pedal edema B/L  Vascular: DP 2+ B/L, PT 1+ B/L  Neurologic: Non-focal  Psychiatry: A & O x 3, Mood & affect appropriate                        16.6   5.28  )-----------( 236      ( 21 May 2022 06:59 )             49.6     05-21    136  |  103  |  36<H>  ----------------------------<  159<H>  4.6   |  19<L>  |  1.42<H>    Ca    10.0      21 May 2022 06:59  Mg     2.3     05-21    TPro  7.8  /  Alb  4.2  /  TBili  1.8<H>  /  DBili  x   /  AST  19  /  ALT  13  /  AlkPhos  78  05-20  TSH: Thyroid Stimulating Hormone, Serum: 2.250 uIU/mL (05-20 @ 14:17)  PT/INR - ( 20 May 2022 14:17 )   PT: 12.3 sec;   INR: 1.03     PTT - ( 20 May 2022 14:17 )  PTT:29.7 sec

## 2022-05-21 NOTE — PROGRESS NOTE ADULT - PROBLEM SELECTOR PLAN 1
Interrogation of ICD @Dr Wilson's office 5/20: back-to-back episodes of VT @ 200+ bpm all terminating with ATP. Since last cath pt has had 41 VT episodes (all falling into VF zone)  -Normal device function. BIV pacing 97%. No programming changes made  -EP following and recommended starting amiodarone 400 mg BID and cardiac cath prior to EPS/vtach ablation (next week)  -c/w home coreg 12.5 mg BID Intermittent couplets/PVCs, NSVT x4, intermittently A-V paced on tele  - Interrogation of ICD @Dr Wilson's office 5/20: back-to-back episodes of VT @ 200+ bpm all terminating with ATP. Since last cath pt has had 41 VT episodes (all falling into VF zone)  - Normal device function. BIV pacing 97%. No programming changes made  - EP following and recommended starting amiodarone 400 mg BID  - Plan for cardiac cath on Monday followed by EPS/VT if cath unremarkable  - c/w home Coreg 12.5 mg BID. c/w Amiodarone 400 mg PO BID per EP recommendations. Intermittent couplets/PVCs, NSVT x4, intermittently A-V paced on tele, EP following  - Interrogation of ICD @Dr Wilson's office 5/20: back-to-back episodes of VT @ 200+ bpm all terminating with ATP. Since last cath pt has had 41 VT episodes (all falling into VF zone)  - Normal device function. BIV pacing 97%. No programming changes made  - Plan for cardiac cath on Monday followed by EPS/VT if cath unremarkable  - c/w home Coreg 12.5 mg BID. c/w Amiodarone 400 mg PO BID per EP recommendations.  - Tele monitoring

## 2022-05-21 NOTE — PROGRESS NOTE ADULT - SUBJECTIVE AND OBJECTIVE BOX
EPS Progress Note    S:   No complaints   T(C): 36.6 (05-21-22 @ 22:00), Max: 36.6 (05-21-22 @ 13:20)  HR: 65 (05-21-22 @ 20:53) (64 - 100)  BP: 97/70 (05-21-22 @ 20:53) (90/66 - 126/84)  RR: 16 (05-21-22 @ 20:53) (16 - 18)  SpO2: 95% (05-21-22 @ 20:53) (95% - 100%)  Wt(kg): --     Telemetry:  V-Paced, NSVT          General:  No acute distress      Chest:  Chest is clear to auscultation bilaterally without wheezes, crackles, or rhonchi  Cardiac:  Regular rate and rhythm.  No murmur, rubs, or gallops heard.  Abdomen:  Soft without rebound or guarding.  Bowel sounds are presnt in all 4 quadrants.  No hepatosplenomegaly  Extremities:  No lower extremity edema is present.  No cyanosis or clubbing       MEDICATIONS  (STANDING):  aMIOdarone    Tablet 400 milliGRAM(s) Oral every 12 hours  aspirin enteric coated 81 milliGRAM(s) Oral daily  atorvastatin 80 milliGRAM(s) Oral at bedtime  carvedilol 12.5 milliGRAM(s) Oral every 12 hours  clopidogrel Tablet 75 milliGRAM(s) Oral daily  dextrose 5%. 1000 milliLiter(s) (50 mL/Hr) IV Continuous <Continuous>  dextrose 5%. 1000 milliLiter(s) (100 mL/Hr) IV Continuous <Continuous>  dextrose 50% Injectable 25 Gram(s) IV Push once  dextrose 50% Injectable 25 Gram(s) IV Push once  dextrose 50% Injectable 12.5 Gram(s) IV Push once  glucagon  Injectable 1 milliGRAM(s) IntraMuscular once  heparin   Injectable 5000 Unit(s) SubCutaneous every 8 hours  insulin lispro (ADMELOG) corrective regimen sliding scale   SubCutaneous Before meals and at bedtime  pantoprazole    Tablet 40 milliGRAM(s) Oral before breakfast    MEDICATIONS  (PRN):  dextrose Oral Gel 15 Gram(s) Oral once PRN Blood Glucose LESS THAN 70 milliGRAM(s)/deciliter                                                         16.6   5.28  )-----------( 236      ( 21 May 2022 06:59 )             49.6     05-21    136  |  103  |  36<H>  ----------------------------<  159<H>  4.6   |  19<L>  |  1.42<H>    Ca    10.0      21 May 2022 06:59  Mg     2.3     05-21    TPro  7.8  /  Alb  4.2  /  TBili  1.8<H>  /  DBili  x   /  AST  19  /  ALT  13  /  AlkPhos  78  05-20    PT/INR - ( 20 May 2022 14:17 )   PT: 12.3 sec;   INR: 1.03          PTT - ( 20 May 2022 14:17 )  PTT:29.7 sec    Assessment/Plan:

## 2022-05-21 NOTE — PROGRESS NOTE ADULT - PROBLEM SELECTOR PROBLEM 3
Chronic HFrEF (heart failure with reduced ejection fraction) Acute on chronic HFrEF (heart failure with reduced ejection fraction)

## 2022-05-22 LAB
ANION GAP SERPL CALC-SCNC: 14 MMOL/L — SIGNIFICANT CHANGE UP (ref 5–17)
APTT BLD: 35 SEC — SIGNIFICANT CHANGE UP (ref 27.5–35.5)
BUN SERPL-MCNC: 42 MG/DL — HIGH (ref 7–23)
CALCIUM SERPL-MCNC: 9.5 MG/DL — SIGNIFICANT CHANGE UP (ref 8.4–10.5)
CHLORIDE SERPL-SCNC: 102 MMOL/L — SIGNIFICANT CHANGE UP (ref 96–108)
CO2 SERPL-SCNC: 23 MMOL/L — SIGNIFICANT CHANGE UP (ref 22–31)
CREAT SERPL-MCNC: 1.61 MG/DL — HIGH (ref 0.5–1.3)
EGFR: 47 ML/MIN/1.73M2 — LOW
GLUCOSE BLDC GLUCOMTR-MCNC: 116 MG/DL — HIGH (ref 70–99)
GLUCOSE BLDC GLUCOMTR-MCNC: 129 MG/DL — HIGH (ref 70–99)
GLUCOSE BLDC GLUCOMTR-MCNC: 143 MG/DL — HIGH (ref 70–99)
GLUCOSE BLDC GLUCOMTR-MCNC: 89 MG/DL — SIGNIFICANT CHANGE UP (ref 70–99)
GLUCOSE SERPL-MCNC: 122 MG/DL — HIGH (ref 70–99)
HCT VFR BLD CALC: 49.1 % — SIGNIFICANT CHANGE UP (ref 39–50)
HGB BLD-MCNC: 16.5 G/DL — SIGNIFICANT CHANGE UP (ref 13–17)
INR BLD: 1.02 — SIGNIFICANT CHANGE UP (ref 0.88–1.16)
MAGNESIUM SERPL-MCNC: 2 MG/DL — SIGNIFICANT CHANGE UP (ref 1.6–2.6)
MCHC RBC-ENTMCNC: 30.6 PG — SIGNIFICANT CHANGE UP (ref 27–34)
MCHC RBC-ENTMCNC: 33.6 GM/DL — SIGNIFICANT CHANGE UP (ref 32–36)
MCV RBC AUTO: 90.9 FL — SIGNIFICANT CHANGE UP (ref 80–100)
NRBC # BLD: 0 /100 WBCS — SIGNIFICANT CHANGE UP (ref 0–0)
PLATELET # BLD AUTO: 208 K/UL — SIGNIFICANT CHANGE UP (ref 150–400)
POTASSIUM SERPL-MCNC: 3.8 MMOL/L — SIGNIFICANT CHANGE UP (ref 3.5–5.3)
POTASSIUM SERPL-SCNC: 3.8 MMOL/L — SIGNIFICANT CHANGE UP (ref 3.5–5.3)
PROTHROM AB SERPL-ACNC: 12.1 SEC — SIGNIFICANT CHANGE UP (ref 10.5–13.4)
RBC # BLD: 5.4 M/UL — SIGNIFICANT CHANGE UP (ref 4.2–5.8)
RBC # FLD: 15.6 % — HIGH (ref 10.3–14.5)
SODIUM SERPL-SCNC: 139 MMOL/L — SIGNIFICANT CHANGE UP (ref 135–145)
URATE SERPL-MCNC: 14.1 MG/DL — HIGH (ref 3.4–8.8)
WBC # BLD: 11.48 K/UL — HIGH (ref 3.8–10.5)
WBC # FLD AUTO: 11.48 K/UL — HIGH (ref 3.8–10.5)

## 2022-05-22 PROCEDURE — 71045 X-RAY EXAM CHEST 1 VIEW: CPT | Mod: 26

## 2022-05-22 PROCEDURE — 99233 SBSQ HOSP IP/OBS HIGH 50: CPT

## 2022-05-22 PROCEDURE — 93010 ELECTROCARDIOGRAM REPORT: CPT

## 2022-05-22 RX ORDER — FUROSEMIDE 40 MG
20 TABLET ORAL ONCE
Refills: 0 | Status: COMPLETED | OUTPATIENT
Start: 2022-05-22 | End: 2022-05-22

## 2022-05-22 RX ORDER — ALLOPURINOL 300 MG
75 TABLET ORAL DAILY
Refills: 0 | Status: DISCONTINUED | OUTPATIENT
Start: 2022-05-22 | End: 2022-05-26

## 2022-05-22 RX ORDER — ACETAMINOPHEN 500 MG
650 TABLET ORAL ONCE
Refills: 0 | Status: COMPLETED | OUTPATIENT
Start: 2022-05-22 | End: 2022-05-22

## 2022-05-22 RX ORDER — FUROSEMIDE 40 MG
40 TABLET ORAL ONCE
Refills: 0 | Status: COMPLETED | OUTPATIENT
Start: 2022-05-22 | End: 2022-05-22

## 2022-05-22 RX ORDER — POTASSIUM CHLORIDE 20 MEQ
20 PACKET (EA) ORAL ONCE
Refills: 0 | Status: COMPLETED | OUTPATIENT
Start: 2022-05-22 | End: 2022-05-22

## 2022-05-22 RX ORDER — ACETAMINOPHEN 500 MG
650 TABLET ORAL ONCE
Refills: 0 | Status: DISCONTINUED | OUTPATIENT
Start: 2022-05-22 | End: 2022-05-22

## 2022-05-22 RX ORDER — CARVEDILOL PHOSPHATE 80 MG/1
12.5 CAPSULE, EXTENDED RELEASE ORAL EVERY 12 HOURS
Refills: 0 | Status: DISCONTINUED | OUTPATIENT
Start: 2022-05-22 | End: 2022-05-25

## 2022-05-22 RX ADMIN — CLOPIDOGREL BISULFATE 75 MILLIGRAM(S): 75 TABLET, FILM COATED ORAL at 12:21

## 2022-05-22 RX ADMIN — Medication 650 MILLIGRAM(S): at 06:30

## 2022-05-22 RX ADMIN — AMIODARONE HYDROCHLORIDE 400 MILLIGRAM(S): 400 TABLET ORAL at 05:47

## 2022-05-22 RX ADMIN — HEPARIN SODIUM 5000 UNIT(S): 5000 INJECTION INTRAVENOUS; SUBCUTANEOUS at 05:48

## 2022-05-22 RX ADMIN — Medication 40 MILLIGRAM(S): at 14:42

## 2022-05-22 RX ADMIN — Medication 75 MILLIGRAM(S): at 22:30

## 2022-05-22 RX ADMIN — HEPARIN SODIUM 5000 UNIT(S): 5000 INJECTION INTRAVENOUS; SUBCUTANEOUS at 14:42

## 2022-05-22 RX ADMIN — HEPARIN SODIUM 5000 UNIT(S): 5000 INJECTION INTRAVENOUS; SUBCUTANEOUS at 22:29

## 2022-05-22 RX ADMIN — CARVEDILOL PHOSPHATE 12.5 MILLIGRAM(S): 80 CAPSULE, EXTENDED RELEASE ORAL at 17:32

## 2022-05-22 RX ADMIN — Medication 81 MILLIGRAM(S): at 12:21

## 2022-05-22 RX ADMIN — Medication 20 MILLIGRAM(S): at 22:40

## 2022-05-22 RX ADMIN — AMIODARONE HYDROCHLORIDE 400 MILLIGRAM(S): 400 TABLET ORAL at 17:25

## 2022-05-22 RX ADMIN — ATORVASTATIN CALCIUM 80 MILLIGRAM(S): 80 TABLET, FILM COATED ORAL at 22:29

## 2022-05-22 RX ADMIN — Medication 20 MILLIEQUIVALENT(S): at 12:20

## 2022-05-22 RX ADMIN — CARVEDILOL PHOSPHATE 12.5 MILLIGRAM(S): 80 CAPSULE, EXTENDED RELEASE ORAL at 05:47

## 2022-05-22 RX ADMIN — PANTOPRAZOLE SODIUM 40 MILLIGRAM(S): 20 TABLET, DELAYED RELEASE ORAL at 05:48

## 2022-05-22 RX ADMIN — Medication 650 MILLIGRAM(S): at 05:47

## 2022-05-22 NOTE — PROVIDER CONTACT NOTE (CHANGE IN STATUS NOTIFICATION) - ASSESSMENT
Pt sleeping interrupted, c/o SOB and placed on 1L NC, 10 minutes later, Pt c/o precordial chest pressure 4/10

## 2022-05-22 NOTE — PROGRESS NOTE ADULT - PROBLEM SELECTOR PLAN 4
JAGRUTI on CKD II, baseline Cr ~1.3  - Cr uptrended to 1.61 from 1.42 today, possible cardiorenal  - Avoid nephrotoxic agents, NSAIDs. Renally dose meds

## 2022-05-22 NOTE — PROGRESS NOTE ADULT - PROBLEM SELECTOR PLAN 1
Intermittent couplets/PVCs, NSVT x4, intermittently A-V paced on tele, EP following  - Interrogation of ICD @Dr Wilson's office 5/20: back-to-back episodes of VT @ 200+ bpm all terminating with ATP. Since last cath pt has had 41 VT episodes (all falling into VF zone)  - Normal device function. BIV pacing 97%. No programming changes made  - Plan for cardiac cath on Monday followed by EPS/VT if cath unremarkable  - c/w home Coreg 12.5 mg BID. c/w Amiodarone 400 mg PO BID per EP recommendations.  - Tele monitoring

## 2022-05-22 NOTE — PROGRESS NOTE ADULT - PROBLEM SELECTOR PLAN 3
Bibasilar rales, +JVD, saturating well on RA  - ECHO 5/21/22 with LVEF 10-15% (reduced from 25-30% 3/2022),   - Takes Lasix 40 mg PO PRN (SOB), s/p Lasix 20 mg IVP x1 5/21 AM, s/p Lasix 40 mg IVP x1 5/21 PM  - f/u volume status in AM and order diuretics if needed.  - States Dr. Perry discontinued Entresto 24-26 mg PO BID when he was feeling lightheaded. Discuss re-initiation of Entrestro 24-26 mg PO BID with Dr. Medina for GDMT.  - c/w Coreg 12.5 mg PO BID  - Core measures, 1.5L fluid restriction, strict I&Os, daily weights Bibasilar rales, +JVD, saturating well on RA  - ECHO 5/21/22 with LVEF 10-15% (reduced from 25-30% 3/2022),   - Takes Lasix 40 mg PO PRN (SOB), s/p Lasix 40 mg IVP x1 5/22 in afternoon  - f/u volume status in AM and order diuretics if needed.  - States Dr. Perry discontinued Entresto 24-26 mg PO BID when he was feeling lightheaded. Discuss re-initiation of Entrestro 24-26 mg PO BID with Dr. Medina for GDMT.  - c/w Coreg 12.5 mg PO BID  - Core measures, 1.5L fluid restriction, strict I&Os, daily weights Bibasilar rales, +JVD, saturating well on RA  - ECHO 5/21/22 with LVEF 10-15% (reduced from 25-30% 3/2022),   - Takes Lasix 40 mg PO PRN (SOB), s/p Lasix 40 mg IVP x1 5/22 in afternoon  - f/u volume status in AM and order diuretics if needed.  - States  Sayjose a discontinued Entresto 24-26 mg PO BID when he was feeling lightheaded. Discuss re-initiation of Entrestro 24-26 mg PO BID with Dr. Medina for GDMT and states will reassess after Cath tomorrow   - c/w Coreg 12.5 mg PO BID  - Core measures, 1.5L fluid restriction, strict I&Os, daily weights

## 2022-05-22 NOTE — CHART NOTE - NSCHARTNOTEFT_GEN_A_CORE
Pt endorsing SOB. VSS and pt SpO2 95% on RA. Pt w/ diminished lung sounds and mildly orthopneic. Given additional Lasix 20mg IV x1. Will continue to monitor.

## 2022-05-22 NOTE — PROGRESS NOTE ADULT - PROBLEM SELECTOR PLAN 2
Chest pain free and complaint with DAPT since last cath 4/18/22  - Trop flat 0.04 x2 CK, D-dimer normal -184  - Cardiac cath @[facility] 4/18/22: mLAD 90% s/p BERNABE, LCx mild disease, RCA mild disease.  - ECHO (03/2022, per MD note): mild LV dilation, EF 25-30%, mild RV dilation, trace AI/MR.   - ECHO 5/21/22: LVEF 10-15%, abnormal septal motion seen due to RV pacing, remaining segments demonstrate severe hypokinesis, D- shaped flattening of the interventricular septum due to RV pressure overload, normal RV size, reduced RVSF, dilated RA, mild AI/AS, pulm HTN (PASP 51 mmHg), mildly dilated ascending aorta, based on TTE 2016 the LVEF is lower is RV function is now reduced.   - Plan for cardiac cath with Dr. Wagner on Monday, consented  - c/w Ecotrin 81 mg PO QD, Plavix 75 mg PO QD, Atorvastatin increased to 80 mg QHS

## 2022-05-22 NOTE — PROGRESS NOTE ADULT - PROBLEM SELECTOR PLAN 6
- , , HDL 29, LDL 84  - Home Atorvastatin increased to 80 mg PO qHS    DVT F: Oral intake  E: Replete electrolytes as needed for K<4 and Mg<2  N: DASH Diet    DVT PPX: SQH  Dispo: pending cath Monday    Case discussed w/ Dr. Medina, Dr. Kaufman

## 2022-05-22 NOTE — PROGRESS NOTE ADULT - ASSESSMENT
66 Y M with PMH HTN, HLD, DM II, chronic HFrEF (EF 25-30% by ECHO), complete heart block (S/P PPM implant 3/2006, s/p upgrade to BiV-ICD 09/2016), CAD (s/p BERNABE x1 mLAD @[facility] 4/18/22), CKD II (baseline Ce ~1.3) who referred from Dr Hess’s office endorsing intermittent episodes of near syncope with palpitations and SOB since his last cath. Interrogation showing back-to-back episodes of VT @ 200+ bpm all terminating with ATP. Since last cath pt has had 41 VT episodes (all falling into VF zone). Amiodarone load initiated. Admitted for acute on chronic HFrEF exacerbation s/p IV diuresis with plan for cardiac cath on Monday followed by possible EPS/VT ablation if cath unremarkable.  Hospital course also c/b acute gout flare, s/p Prednisone 40 mg PO x1 with resolution.

## 2022-05-22 NOTE — PROGRESS NOTE ADULT - SUBJECTIVE AND OBJECTIVE BOX
INCOMPLETE    OVERNIGHT EVENTS:  No acute events overnight.    SUBJECTIVE / INTERVAL HPI: Patient seen and examined at bedside.    Denies CP, SOB, dizziness/lightheadedness, palpitations    12 point ROS otherwise negative    VITAL SIGNS:  Vital Signs Last 24 Hrs  T(C): 36 (22 May 2022 09:44), Max: 36.6 (21 May 2022 13:20)  T(F): 96.8 (22 May 2022 09:44), Max: 97.9 (21 May 2022 13:20)  HR: 69 (22 May 2022 05:13) (62 - 100)  BP: 92/59 (22 May 2022 08:38) (92/59 - 126/84)  BP(mean): --  RR: 18 (22 May 2022 08:38) (16 - 18)  SpO2: 95% (22 May 2022 08:38) (95% - 99%)      I&O's Summary    21 May 2022 07:01  -  22 May 2022 07:00  --------------------------------------------------------  IN: 240 mL / OUT: 1230 mL / NET: -990 mL    22 May 2022 07:01  -  22 May 2022 09:52  --------------------------------------------------------  IN: 0 mL / OUT: 0 mL / NET: 0 mL          PHYSICAL EXAM:    General: sitting up in bed, NAD  HEENT: conjunctiva clear; MMM  Neck: supple, no JVD  Cardiovascular: RRR, no murmurs  Respiratory: CTA B/L  Gastrointestinal: soft, NT/ND, +BS  Extremities: WWP, no edema or cyanosis  Vascular: Peripheral pulses palpable 2+ bilaterally/ carotid 2+ b/l, no bruits; radial 2+ b/l; femoral 2+ b/l no bruits; DP/PT 2+ b/l  Neurological: AAOx3, no focal deficits    MEDICATIONS:  MEDICATIONS  (STANDING):  aMIOdarone    Tablet 400 milliGRAM(s) Oral every 12 hours  aspirin enteric coated 81 milliGRAM(s) Oral daily  atorvastatin 80 milliGRAM(s) Oral at bedtime  carvedilol 12.5 milliGRAM(s) Oral every 12 hours  clopidogrel Tablet 75 milliGRAM(s) Oral daily  dextrose 5%. 1000 milliLiter(s) (50 mL/Hr) IV Continuous <Continuous>  dextrose 5%. 1000 milliLiter(s) (100 mL/Hr) IV Continuous <Continuous>  dextrose 50% Injectable 25 Gram(s) IV Push once  dextrose 50% Injectable 12.5 Gram(s) IV Push once  dextrose 50% Injectable 25 Gram(s) IV Push once  glucagon  Injectable 1 milliGRAM(s) IntraMuscular once  heparin   Injectable 5000 Unit(s) SubCutaneous every 8 hours  insulin lispro (ADMELOG) corrective regimen sliding scale   SubCutaneous Before meals and at bedtime  pantoprazole    Tablet 40 milliGRAM(s) Oral before breakfast  potassium chloride    Tablet ER 20 milliEquivalent(s) Oral once    MEDICATIONS  (PRN):  dextrose Oral Gel 15 Gram(s) Oral once PRN Blood Glucose LESS THAN 70 milliGRAM(s)/deciliter      LABS:                        16.5   11.48 )-----------( 208      ( 22 May 2022 05:48 )             49.1       05-22    139  |  102  |  42<H>  ----------------------------<  122<H>  3.8   |  23  |  1.61<H>    Ca    9.5      22 May 2022 05:48  Mg     2.0     05-22    TPro  7.8  /  Alb  4.2  /  TBili  1.8<H>  /  DBili  x   /  AST  19  /  ALT  13  /  AlkPhos  78  05-20      PT/INR - ( 22 May 2022 05:48 )   PT: 12.1 sec;   INR: 1.02          PTT - ( 22 May 2022 05:48 )  PTT:35.0 sec    CARDIAC MARKERS ( 21 May 2022 16:53 )  x     / 0.02 ng/mL / 122 U/L / x     / 3.5 ng/mL  CARDIAC MARKERS ( 20 May 2022 18:30 )  x     / 0.04 ng/mL / 113 U/L / x     / 4.0 ng/mL  CARDIAC MARKERS ( 20 May 2022 14:17 )  x     / 0.04 ng/mL / 135 U/L / x     / 4.5 ng/mL        TELEMETRY:    RADIOLOGY & ADDITIONAL TESTS: Reviewed. INCOMPLETE    OVERNIGHT EVENTS:  No acute events overnight.    SUBJECTIVE / INTERVAL HPI: Patient seen and examined at bedside.    Pt earlier this morning reported right sided 4/10 chest pain, but pt now states pain is resolved after receiving Tylenol. Pt c/o shortness of breathe this morning when lying flat and non productive dry cough. Pt denies any further CP, dizziness/lightheadedness, palpitations, weakness. Pt reports no pain of his right great toe.    12 point ROS otherwise negative    VITAL SIGNS:  Vital Signs Last 24 Hrs  T(C): 36 (22 May 2022 09:44), Max: 36.6 (21 May 2022 13:20)  T(F): 96.8 (22 May 2022 09:44), Max: 97.9 (21 May 2022 13:20)  HR: 69 (22 May 2022 05:13) (62 - 100)  BP: 92/59 (22 May 2022 08:38) (92/59 - 126/84)  BP(mean): --  RR: 18 (22 May 2022 08:38) (16 - 18)  SpO2: 95% (22 May 2022 08:38) (95% - 99%)      I&O's Summary    21 May 2022 07:01  -  22 May 2022 07:00  --------------------------------------------------------  IN: 240 mL / OUT: 1230 mL / NET: -990 mL    22 May 2022 07:01  -  22 May 2022 09:52  --------------------------------------------------------  IN: 0 mL / OUT: 0 mL / NET: 0 mL          PHYSICAL EXAM:    General: sitting up in bed, NAD  HEENT: conjunctiva clear; MMM  Neck: supple, mild JVD  Cardiovascular: RRR, no murmurs  Respiratory: Bibasilar rales   Gastrointestinal: soft, mild NT/ND, +BS  Extremities: WWP, no pedal edema or cyanosis  Vascular: Peripheral pulses palpable 2+ bilaterally/ carotid 2+ b/l, no bruits; radial 2+ b/l; femoral 2+ b/l no bruits; DP/PT 2+ b/l  Neurological: AAOx3, no focal deficits    MEDICATIONS:  MEDICATIONS  (STANDING):  aMIOdarone    Tablet 400 milliGRAM(s) Oral every 12 hours  aspirin enteric coated 81 milliGRAM(s) Oral daily  atorvastatin 80 milliGRAM(s) Oral at bedtime  carvedilol 12.5 milliGRAM(s) Oral every 12 hours  clopidogrel Tablet 75 milliGRAM(s) Oral daily  dextrose 5%. 1000 milliLiter(s) (50 mL/Hr) IV Continuous <Continuous>  dextrose 5%. 1000 milliLiter(s) (100 mL/Hr) IV Continuous <Continuous>  dextrose 50% Injectable 25 Gram(s) IV Push once  dextrose 50% Injectable 12.5 Gram(s) IV Push once  dextrose 50% Injectable 25 Gram(s) IV Push once  glucagon  Injectable 1 milliGRAM(s) IntraMuscular once  heparin   Injectable 5000 Unit(s) SubCutaneous every 8 hours  insulin lispro (ADMELOG) corrective regimen sliding scale   SubCutaneous Before meals and at bedtime  pantoprazole    Tablet 40 milliGRAM(s) Oral before breakfast  potassium chloride    Tablet ER 20 milliEquivalent(s) Oral once    MEDICATIONS  (PRN):  dextrose Oral Gel 15 Gram(s) Oral once PRN Blood Glucose LESS THAN 70 milliGRAM(s)/deciliter      LABS:                        16.5   11.48 )-----------( 208      ( 22 May 2022 05:48 )             49.1       05-22    139  |  102  |  42<H>  ----------------------------<  122<H>  3.8   |  23  |  1.61<H>    Ca    9.5      22 May 2022 05:48  Mg     2.0     05-22    TPro  7.8  /  Alb  4.2  /  TBili  1.8<H>  /  DBili  x   /  AST  19  /  ALT  13  /  AlkPhos  78  05-20      PT/INR - ( 22 May 2022 05:48 )   PT: 12.1 sec;   INR: 1.02          PTT - ( 22 May 2022 05:48 )  PTT:35.0 sec    CARDIAC MARKERS ( 21 May 2022 16:53 )  x     / 0.02 ng/mL / 122 U/L / x     / 3.5 ng/mL  CARDIAC MARKERS ( 20 May 2022 18:30 )  x     / 0.04 ng/mL / 113 U/L / x     / 4.0 ng/mL  CARDIAC MARKERS ( 20 May 2022 14:17 )  x     / 0.04 ng/mL / 135 U/L / x     / 4.5 ng/mL        TELEMETRY: V paced on tele, no concerning alarms     RADIOLOGY & ADDITIONAL TESTS: Reviewed. INCOMPLETE    OVERNIGHT EVENTS:  No acute events overnight.    SUBJECTIVE / INTERVAL HPI: Patient seen and examined at bedside.    Pt earlier this morning reported right sided 4/10 chest pain, but pt now states pain is resolved after receiving Tylenol. Pt c/o shortness of breathe this morning when lying flat and non productive dry cough. Pt denies any further CP, dizziness/lightheadedness, palpitations, weakness. Pt reports no pain of his right great toe.    12 point ROS otherwise negative    VITAL SIGNS:  Vital Signs Last 24 Hrs  T(C): 36 (22 May 2022 09:44), Max: 36.6 (21 May 2022 13:20)  T(F): 96.8 (22 May 2022 09:44), Max: 97.9 (21 May 2022 13:20)  HR: 69 (22 May 2022 05:13) (62 - 100)  BP: 92/59 (22 May 2022 08:38) (92/59 - 126/84)  BP(mean): --  RR: 18 (22 May 2022 08:38) (16 - 18)  SpO2: 95% (22 May 2022 08:38) (95% - 99%)      I&O's Summary    21 May 2022 07:01  -  22 May 2022 07:00  --------------------------------------------------------  IN: 240 mL / OUT: 1230 mL / NET: -990 mL    22 May 2022 07:01  -  22 May 2022 09:52  --------------------------------------------------------  IN: 0 mL / OUT: 0 mL / NET: 0 mL          PHYSICAL EXAM:    General: sitting up in bed, NAD  HEENT: conjunctiva clear; MMM  Neck: supple, mild JVD  Cardiovascular: RRR, no murmurs  Respiratory: Bibasilar rales L>R  Gastrointestinal: soft, mild NT/ND, +BS  Extremities: WWP, no pedal edema or cyanosis  Vascular: Peripheral pulses palpable 2+ bilaterally/ carotid 2+ b/l, no bruits; radial 2+ b/l; femoral 2+ b/l no bruits; DP/PT 2+ b/l  Neurological: AAOx3, no focal deficits    MEDICATIONS:  MEDICATIONS  (STANDING):  aMIOdarone    Tablet 400 milliGRAM(s) Oral every 12 hours  aspirin enteric coated 81 milliGRAM(s) Oral daily  atorvastatin 80 milliGRAM(s) Oral at bedtime  carvedilol 12.5 milliGRAM(s) Oral every 12 hours  clopidogrel Tablet 75 milliGRAM(s) Oral daily  dextrose 5%. 1000 milliLiter(s) (50 mL/Hr) IV Continuous <Continuous>  dextrose 5%. 1000 milliLiter(s) (100 mL/Hr) IV Continuous <Continuous>  dextrose 50% Injectable 25 Gram(s) IV Push once  dextrose 50% Injectable 12.5 Gram(s) IV Push once  dextrose 50% Injectable 25 Gram(s) IV Push once  glucagon  Injectable 1 milliGRAM(s) IntraMuscular once  heparin   Injectable 5000 Unit(s) SubCutaneous every 8 hours  insulin lispro (ADMELOG) corrective regimen sliding scale   SubCutaneous Before meals and at bedtime  pantoprazole    Tablet 40 milliGRAM(s) Oral before breakfast  potassium chloride    Tablet ER 20 milliEquivalent(s) Oral once    MEDICATIONS  (PRN):  dextrose Oral Gel 15 Gram(s) Oral once PRN Blood Glucose LESS THAN 70 milliGRAM(s)/deciliter      LABS:                        16.5   11.48 )-----------( 208      ( 22 May 2022 05:48 )             49.1       05-22    139  |  102  |  42<H>  ----------------------------<  122<H>  3.8   |  23  |  1.61<H>    Ca    9.5      22 May 2022 05:48  Mg     2.0     05-22    TPro  7.8  /  Alb  4.2  /  TBili  1.8<H>  /  DBili  x   /  AST  19  /  ALT  13  /  AlkPhos  78  05-20      PT/INR - ( 22 May 2022 05:48 )   PT: 12.1 sec;   INR: 1.02          PTT - ( 22 May 2022 05:48 )  PTT:35.0 sec    CARDIAC MARKERS ( 21 May 2022 16:53 )  x     / 0.02 ng/mL / 122 U/L / x     / 3.5 ng/mL  CARDIAC MARKERS ( 20 May 2022 18:30 )  x     / 0.04 ng/mL / 113 U/L / x     / 4.0 ng/mL  CARDIAC MARKERS ( 20 May 2022 14:17 )  x     / 0.04 ng/mL / 135 U/L / x     / 4.5 ng/mL        TELEMETRY: V paced on tele, no concerning alarms     RADIOLOGY & ADDITIONAL TESTS: Reviewed. OVERNIGHT EVENTS:  No acute events overnight.    SUBJECTIVE / INTERVAL HPI: Patient seen and examined at bedside.    Pt earlier this morning reported right sided 4/10 chest pain, but pt now states pain is resolved after receiving Tylenol. Pt c/o shortness of breathe this morning when lying flat and non productive dry cough. Pt denies any further CP, dizziness/lightheadedness, palpitations, weakness. Pt reports no pain of his right great toe.    12 point ROS otherwise negative    VITAL SIGNS:  Vital Signs Last 24 Hrs  T(C): 36 (22 May 2022 09:44), Max: 36.6 (21 May 2022 13:20)  T(F): 96.8 (22 May 2022 09:44), Max: 97.9 (21 May 2022 13:20)  HR: 69 (22 May 2022 05:13) (62 - 100)  BP: 92/59 (22 May 2022 08:38) (92/59 - 126/84)  BP(mean): --  RR: 18 (22 May 2022 08:38) (16 - 18)  SpO2: 95% (22 May 2022 08:38) (95% - 99%)      I&O's Summary    21 May 2022 07:01  -  22 May 2022 07:00  --------------------------------------------------------  IN: 240 mL / OUT: 1230 mL / NET: -990 mL    22 May 2022 07:01  -  22 May 2022 09:52  --------------------------------------------------------  IN: 0 mL / OUT: 0 mL / NET: 0 mL          PHYSICAL EXAM:    General: sitting up in bed, NAD  HEENT: conjunctiva clear; MMM  Neck: supple, mild JVD  Cardiovascular: RRR, no murmurs  Respiratory: Bibasilar rales L>R  Gastrointestinal: soft, mild NT/ND, +BS  Extremities: WWP, no pedal edema or cyanosis  Vascular: Peripheral pulses palpable 2+ bilaterally/ carotid 2+ b/l, no bruits; radial 2+ b/l; femoral 2+ b/l no bruits; DP/PT 2+ b/l  Neurological: AAOx3, no focal deficits    MEDICATIONS:  MEDICATIONS  (STANDING):  aMIOdarone    Tablet 400 milliGRAM(s) Oral every 12 hours  aspirin enteric coated 81 milliGRAM(s) Oral daily  atorvastatin 80 milliGRAM(s) Oral at bedtime  carvedilol 12.5 milliGRAM(s) Oral every 12 hours  clopidogrel Tablet 75 milliGRAM(s) Oral daily  dextrose 5%. 1000 milliLiter(s) (50 mL/Hr) IV Continuous <Continuous>  dextrose 5%. 1000 milliLiter(s) (100 mL/Hr) IV Continuous <Continuous>  dextrose 50% Injectable 25 Gram(s) IV Push once  dextrose 50% Injectable 12.5 Gram(s) IV Push once  dextrose 50% Injectable 25 Gram(s) IV Push once  glucagon  Injectable 1 milliGRAM(s) IntraMuscular once  heparin   Injectable 5000 Unit(s) SubCutaneous every 8 hours  insulin lispro (ADMELOG) corrective regimen sliding scale   SubCutaneous Before meals and at bedtime  pantoprazole    Tablet 40 milliGRAM(s) Oral before breakfast  potassium chloride    Tablet ER 20 milliEquivalent(s) Oral once    MEDICATIONS  (PRN):  dextrose Oral Gel 15 Gram(s) Oral once PRN Blood Glucose LESS THAN 70 milliGRAM(s)/deciliter      LABS:                        16.5   11.48 )-----------( 208      ( 22 May 2022 05:48 )             49.1       05-22    139  |  102  |  42<H>  ----------------------------<  122<H>  3.8   |  23  |  1.61<H>    Ca    9.5      22 May 2022 05:48  Mg     2.0     05-22    TPro  7.8  /  Alb  4.2  /  TBili  1.8<H>  /  DBili  x   /  AST  19  /  ALT  13  /  AlkPhos  78  05-20      PT/INR - ( 22 May 2022 05:48 )   PT: 12.1 sec;   INR: 1.02          PTT - ( 22 May 2022 05:48 )  PTT:35.0 sec    CARDIAC MARKERS ( 21 May 2022 16:53 )  x     / 0.02 ng/mL / 122 U/L / x     / 3.5 ng/mL  CARDIAC MARKERS ( 20 May 2022 18:30 )  x     / 0.04 ng/mL / 113 U/L / x     / 4.0 ng/mL  CARDIAC MARKERS ( 20 May 2022 14:17 )  x     / 0.04 ng/mL / 135 U/L / x     / 4.5 ng/mL        TELEMETRY: V paced on tele, no concerning alarms     RADIOLOGY & ADDITIONAL TESTS: Reviewed.

## 2022-05-23 LAB
ANION GAP SERPL CALC-SCNC: 14 MMOL/L — SIGNIFICANT CHANGE UP (ref 5–17)
BUN SERPL-MCNC: 49 MG/DL — HIGH (ref 7–23)
CALCIUM SERPL-MCNC: 9.8 MG/DL — SIGNIFICANT CHANGE UP (ref 8.4–10.5)
CHLORIDE SERPL-SCNC: 104 MMOL/L — SIGNIFICANT CHANGE UP (ref 96–108)
CO2 SERPL-SCNC: 22 MMOL/L — SIGNIFICANT CHANGE UP (ref 22–31)
CREAT SERPL-MCNC: 1.6 MG/DL — HIGH (ref 0.5–1.3)
EGFR: 48 ML/MIN/1.73M2 — LOW
GLUCOSE BLDC GLUCOMTR-MCNC: 122 MG/DL — HIGH (ref 70–99)
GLUCOSE BLDC GLUCOMTR-MCNC: 163 MG/DL — HIGH (ref 70–99)
GLUCOSE BLDC GLUCOMTR-MCNC: 98 MG/DL — SIGNIFICANT CHANGE UP (ref 70–99)
GLUCOSE SERPL-MCNC: 121 MG/DL — HIGH (ref 70–99)
HCT VFR BLD CALC: 49.7 % — SIGNIFICANT CHANGE UP (ref 39–50)
HGB BLD-MCNC: 16.5 G/DL — SIGNIFICANT CHANGE UP (ref 13–17)
MAGNESIUM SERPL-MCNC: 2 MG/DL — SIGNIFICANT CHANGE UP (ref 1.6–2.6)
MCHC RBC-ENTMCNC: 30.2 PG — SIGNIFICANT CHANGE UP (ref 27–34)
MCHC RBC-ENTMCNC: 33.2 GM/DL — SIGNIFICANT CHANGE UP (ref 32–36)
MCV RBC AUTO: 91 FL — SIGNIFICANT CHANGE UP (ref 80–100)
NRBC # BLD: 0 /100 WBCS — SIGNIFICANT CHANGE UP (ref 0–0)
PLATELET # BLD AUTO: 209 K/UL — SIGNIFICANT CHANGE UP (ref 150–400)
POTASSIUM SERPL-MCNC: 4.2 MMOL/L — SIGNIFICANT CHANGE UP (ref 3.5–5.3)
POTASSIUM SERPL-SCNC: 4.2 MMOL/L — SIGNIFICANT CHANGE UP (ref 3.5–5.3)
RBC # BLD: 5.46 M/UL — SIGNIFICANT CHANGE UP (ref 4.2–5.8)
RBC # FLD: 15.9 % — HIGH (ref 10.3–14.5)
SODIUM SERPL-SCNC: 140 MMOL/L — SIGNIFICANT CHANGE UP (ref 135–145)
WBC # BLD: 10.39 K/UL — SIGNIFICANT CHANGE UP (ref 3.8–10.5)
WBC # FLD AUTO: 10.39 K/UL — SIGNIFICANT CHANGE UP (ref 3.8–10.5)

## 2022-05-23 PROCEDURE — 99233 SBSQ HOSP IP/OBS HIGH 50: CPT

## 2022-05-23 RX ORDER — RANOLAZINE 500 MG/1
500 TABLET, FILM COATED, EXTENDED RELEASE ORAL
Refills: 0 | Status: DISCONTINUED | OUTPATIENT
Start: 2022-05-23 | End: 2022-05-25

## 2022-05-23 RX ORDER — DAPAGLIFLOZIN 10 MG/1
1 TABLET, FILM COATED ORAL
Qty: 30 | Refills: 0
Start: 2022-05-23 | End: 2022-06-21

## 2022-05-23 RX ORDER — FUROSEMIDE 40 MG
40 TABLET ORAL DAILY
Refills: 0 | Status: DISCONTINUED | OUTPATIENT
Start: 2022-05-23 | End: 2022-05-24

## 2022-05-23 RX ORDER — SPIRONOLACTONE 25 MG/1
25 TABLET, FILM COATED ORAL DAILY
Refills: 0 | Status: DISCONTINUED | OUTPATIENT
Start: 2022-05-23 | End: 2022-05-26

## 2022-05-23 RX ADMIN — Medication 75 MILLIGRAM(S): at 13:07

## 2022-05-23 RX ADMIN — Medication 40 MILLIGRAM(S): at 19:14

## 2022-05-23 RX ADMIN — Medication 81 MILLIGRAM(S): at 05:56

## 2022-05-23 RX ADMIN — CARVEDILOL PHOSPHATE 12.5 MILLIGRAM(S): 80 CAPSULE, EXTENDED RELEASE ORAL at 19:14

## 2022-05-23 RX ADMIN — CARVEDILOL PHOSPHATE 12.5 MILLIGRAM(S): 80 CAPSULE, EXTENDED RELEASE ORAL at 05:57

## 2022-05-23 RX ADMIN — PANTOPRAZOLE SODIUM 40 MILLIGRAM(S): 20 TABLET, DELAYED RELEASE ORAL at 05:57

## 2022-05-23 RX ADMIN — Medication 2: at 22:33

## 2022-05-23 RX ADMIN — RANOLAZINE 500 MILLIGRAM(S): 500 TABLET, FILM COATED, EXTENDED RELEASE ORAL at 22:33

## 2022-05-23 RX ADMIN — HEPARIN SODIUM 5000 UNIT(S): 5000 INJECTION INTRAVENOUS; SUBCUTANEOUS at 22:32

## 2022-05-23 RX ADMIN — ATORVASTATIN CALCIUM 80 MILLIGRAM(S): 80 TABLET, FILM COATED ORAL at 22:33

## 2022-05-23 RX ADMIN — AMIODARONE HYDROCHLORIDE 400 MILLIGRAM(S): 400 TABLET ORAL at 05:56

## 2022-05-23 RX ADMIN — CLOPIDOGREL BISULFATE 75 MILLIGRAM(S): 75 TABLET, FILM COATED ORAL at 05:56

## 2022-05-23 RX ADMIN — AMIODARONE HYDROCHLORIDE 400 MILLIGRAM(S): 400 TABLET ORAL at 19:14

## 2022-05-23 RX ADMIN — HEPARIN SODIUM 5000 UNIT(S): 5000 INJECTION INTRAVENOUS; SUBCUTANEOUS at 05:57

## 2022-05-23 RX ADMIN — SPIRONOLACTONE 25 MILLIGRAM(S): 25 TABLET, FILM COATED ORAL at 19:14

## 2022-05-23 NOTE — PROGRESS NOTE ADULT - PROBLEM SELECTOR PLAN 4
JAGRUTI on CKD II, baseline Cr ~1.3  - Cr uptrended to 1.61 from 1.42 today, possible cardiorenal  - Avoid nephrotoxic agents, NSAIDs. Renally dose meds JAGRUTI on CKD II, baseline Cr ~1.3  - Cr today 1.62, 1.42 on 5/21 possible cardiorenal  - Avoid nephrotoxic agents, NSAIDs. Renally dose meds

## 2022-05-23 NOTE — PROVIDER CONTACT NOTE (OTHER) - ACTION/TREATMENT ORDERED:
RAMSES norman made aware. Regular dose of 12.5mg cardivolol given. ECG performed. Pt self converted to paced sinus rhythm.

## 2022-05-23 NOTE — PROGRESS NOTE ADULT - PROBLEM SELECTOR PLAN 3
Bibasilar rales, +JVD, saturating well on RA  - ECHO 5/21/22 with LVEF 10-15% (reduced from 25-30% 3/2022),   - Takes Lasix 40 mg PO PRN (SOB), s/p Lasix 40 mg IVP x1 5/22 in afternoon  - f/u volume status in AM and order diuretics if needed.  - States  Sayjose a discontinued Entresto 24-26 mg PO BID when he was feeling lightheaded. Discuss re-initiation of Entrestro 24-26 mg PO BID with Dr. Medina for GDMT and states will reassess after Cath tomorrow   - c/w Coreg 12.5 mg PO BID  - Core measures, 1.5L fluid restriction, strict I&Os, daily weights Bibasilar rales L>R, +JVD, saturating well on RA  - ECHO 5/21/22 with LVEF 10-15% (reduced from 25-30% 3/2022),   - Restarted Home Lasix 40 mg PO daily, Started spironolactone 25mg daily, considering starting of Farsigxa pending insurance coverage    - f/u volume status in AM and order diuretics if needed.  - Reinitiated Entresto 24-26 mg PO BID, will monitor side effects as pt was light headed on Entresto in the past   - c/w Coreg 12.5 mg PO BID  - Pt now Net negative 1.9 since admission, -700 in last 24   - Core measures, 1.5L fluid restriction, strict I&Os, daily weights

## 2022-05-23 NOTE — PROVIDER CONTACT NOTE (OTHER) - ASSESSMENT
No c/o CP or palpitations. no s/s bleeding or hematoma at R) radial surgical incision. pulses intact. ZW=269/82. Pt asymptomatic

## 2022-05-23 NOTE — PROGRESS NOTE ADULT - PROBLEM SELECTOR PLAN 2
Chest pain free and complaint with DAPT since last cath 4/18/22  - Trop flat 0.04 x2 CK, D-dimer normal -184  - [facility]: mLAD 90% s/p BERNABE, LCx mild disease, RCA mild disease.  - ECHO (03/2022, per MD note): mild LV dilation, EF 25-30%, mild RV dilation, trace AI/MR.   - ECHO 5/21/22: LVEF 10-15%, abnormal septal motion seen due to RV pacing, remaining segments demonstrate severe hypokinesis, D- shaped flattening of the interventricular septum due to RV pressure overload, normal RV size, reduced RVSF, dilated RA, mild AI/AS, pulm HTN (PASP 51 mmHg), mildly dilated ascending aorta, based on TTE 2016 the LVEF is lower is RV function is now reduced.   - Plan for cardiac cath with Dr. Wagner on Monday, consented  - c/w Ecotrin 81 mg PO QD, Plavix 75 mg PO QD, Atorvastatin increased to 80 mg QHS Chest pain free and complaint with DAPT since last cath 4/18/22  - Trop flat 0.04 x2 CK, D-dimer normal -184  - Cardiac cath @[facility] 4/18/22: mLAD 90% s/p BERNABE, LCx mild disease, RCA mild disease.  - ECHO (03/2022, per MD note): mild LV dilation, EF 25-30%, mild RV dilation, trace AI/MR.   - ECHO 5/21/22: LVEF 10-15%, abnormal septal motion seen due to RV pacing, remaining segments demonstrate severe hypokinesis, D- shaped flattening of the interventricular septum due to RV pressure overload, normal RV size, reduced RVSF, dilated RA, mild AI/AS, pulm HTN (PASP 51 mmHg), mildly dilated ascending aorta, based on TTE 2016 the LVEF is lower is RV function is now reduced.   - Plan for cardiac cath with Dr. Wagner today  - c/w Ecotrin 81 mg PO QD, Plavix 75 mg PO QD, Atorvastatin increased to 80 mg QHS

## 2022-05-23 NOTE — PROGRESS NOTE ADULT - ASSESSMENT
66 Y M with PMH HTN, HLD, DM II, chronic HFrEF (EF 25-30% by ECHO), complete heart block (S/P PPM implant 3/2006, s/p upgrade to BiV-ICD 09/2016), CAD (s/p BERNABE x1 mLAD @[facility] 4/18/22), CKD II (baseline Ce ~1.3) who referred from Dr Hess’s office endorsing intermittent episodes of near syncope with palpitations and SOB since his last cath. Interrogation showing back-to-back episodes of VT @ 200+ bpm all terminating with ATP. Since last cath pt has had 41 VT episodes (all falling into VF zone). Amiodarone load initiated. Hospital course also c/b acute gout flare, s/p Prednisone 40 mg PO x1 with resolution and started on allopurinol 75mg.  Admitted for acute on chronic HFrEF exacerbation s/p IV diuresis with cardiac cath on Monday 5/23 with Dr. Wagner showing ____ . by possible EPS/VT ablation if cath unremarkable.

## 2022-05-23 NOTE — PROGRESS NOTE ADULT - NS ATTEND AMEND GEN_ALL_CORE FT
66M w/ HTN, HLD, chronic sCHF(EF25-30%), CHB s/p PPM, upgraded to BI-V in 2016, CAD s/p PCI in April '22 presented to EP clinic w/ near syncope, SOB, fatigue and palpitations found to have intermittent VT, admitted to cardiac Tele for further w/u  ROS: Patient reports PND x 4 wks, no change. No HSIEH, no edema  Physical Exam notable for: elderly male patient sitting up in bed in NAD, flat neck veins, RRR, no MGR detected, clear lungs, overly nourished abdomen, no fluid wave detected, no pretibial pitting edema, no ankle edema, skin WWP, A&Ox3  TTE 5/21/22: Severely reduced left ventricular systolic function, LVEF 10-15% . Abnormal septal motion seen due to right ventricular pacing.   Remaining segments demonstrate severe hypokinesis. D shaped flattening of the interventricular septum due to right   ventricular pressure overload.  Normal RV size, reduced RV systolic function. Dilated RA. 1+AI, 1+AS. PASP 51  Plan for:  NPO for [facility] ischemic evaluation with Dr Wagner today  EP initiated Amiodarone 400 BID, cont BB, further recs pending [facility]  Lasix 40mg po daily to maintain euvolemia  Carvedilol 12.5 BID  Entresto 24/26 BID, patient willing to reattempt (was dcd as outpt due to 'intolerance')  Spironolactone 25mg daily initiated  Farxiga 5mg po daily initiated, rx sent to [facility] to check coverage  Core Measure CHF orders  Bedside CHF Education, supplies to be given to patient  PT consult, recs pending  Dietician/Nutrition consult for CHF/cardiac diet reinforcement  Future planning: patient to have 1wk f/u appt made with Cardiologist Bernard/Justin for quality improvement CHF readmission risk reduction  Mallika Worthington M.D.  Cardiology Attending 66M w/ HTN, HLD, chronic sCHF(EF25-30%), CHB s/p PPM, upgraded to BI-V in 2016, CAD s/p PCI in April '22 presented to EP clinic w/ near syncope, SOB, fatigue and palpitations found to have intermittent VT, admitted to cardiac Tele for further w/u  ROS: Patient reports PND x 4 wks, no change. No HSIEH, no edema  Physical Exam notable for: elderly male patient sitting up in bed in NAD, flat neck veins, RRR, no MGR detected, clear lungs, overly nourished abdomen, no fluid wave detected, no pretibial pitting edema, no ankle edema, skin WWP, A&Ox3  TTE 5/21/22: Severely reduced left ventricular systolic function, LVEF 10-15% . Abnormal septal motion seen due to right ventricular pacing.   Remaining segments demonstrate severe hypokinesis. D shaped flattening of the interventricular septum due to right   ventricular pressure overload.  Normal RV size, reduced RV systolic function. Dilated RA. 1+AI, 1+AS. PASP 51  Plan for:  NPO for [facility] ischemic evaluation with Dr Wagner today  EP initiated Amiodarone 400 BID, cont BB, further recs pending [facility]  Lasix 40mg po daily to maintain euvolemia  Carvedilol 12.5 BID  Entresto 24/26 BID, patient willing to reattempt (was dcd as outpt due to 'intolerance')  Spironolactone 25mg daily initiated  Farxiga 10mg po daily initiated, rx sent to [facility] to check coverage  Core Measure CHF orders  Bedside CHF Education, supplies to be given to patient  PT consult, recs pending  Dietician/Nutrition consult for CHF/cardiac diet reinforcement  Future planning: patient to have 1wk f/u appt made with Cardiologist Bernard/Justin for quality improvement CHF readmission risk reduction  Mallika Worthington M.D.  Cardiology Attending

## 2022-05-23 NOTE — PROGRESS NOTE ADULT - PROBLEM SELECTOR PLAN 5
TTP to R great toe on admit, s/p prednisone 40 mg PO x1 with resolution  -Serum uric acid 14.1 5/22 TTP to R great toe on admit, s/p prednisone 40 mg PO x1 with resolution  -Serum uric acid 14.1 5/22 restarted on allopurinol 75mg daily (as per [facility]-home dose 100mg)

## 2022-05-23 NOTE — PROGRESS NOTE ADULT - SUBJECTIVE AND OBJECTIVE BOX
INCOMPLETE    OVERNIGHT EVENTS:  No acute events overnight.    SUBJECTIVE / INTERVAL HPI: Patient seen and examined at bedside.    Denies CP, SOB, dizziness/lightheadedness, palpitations    12 point ROS otherwise negative    VITAL SIGNS:  Vital Signs Last 24 Hrs  T(C): 36.4 (23 May 2022 06:16), Max: 36.4 (23 May 2022 06:16)  T(F): 97.6 (23 May 2022 06:16), Max: 97.6 (23 May 2022 06:16)  HR: 76 (23 May 2022 05:55) (58 - 84)  BP: 105/80 (23 May 2022 05:55) (92/59 - 111/70)  BP(mean): --  RR: 18 (23 May 2022 05:55) (18 - 18)  SpO2: 96% (23 May 2022 05:55) (93% - 96%)      I&O's Summary    22 May 2022 07:01  -  23 May 2022 07:00  --------------------------------------------------------  IN: 540 mL / OUT: 1250 mL / NET: -710 mL          PHYSICAL EXAM:    General: sitting up in bed, NAD  HEENT: conjunctiva clear; MMM  Neck: supple, no JVD  Cardiovascular: RRR, no murmurs  Respiratory: CTA B/L  Gastrointestinal: soft, NT/ND, +BS  Extremities: WWP, no edema or cyanosis  Vascular: Peripheral pulses palpable 2+ bilaterally/ carotid 2+ b/l, no bruits; radial 2+ b/l; femoral 2+ b/l no bruits; DP/PT 2+ b/l  Neurological: AAOx3, no focal deficits    MEDICATIONS:  MEDICATIONS  (STANDING):  allopurinol 75 milliGRAM(s) Oral daily  aMIOdarone    Tablet 400 milliGRAM(s) Oral every 12 hours  aspirin enteric coated 81 milliGRAM(s) Oral daily  atorvastatin 80 milliGRAM(s) Oral at bedtime  carvedilol 12.5 milliGRAM(s) Oral every 12 hours  clopidogrel Tablet 75 milliGRAM(s) Oral daily  dextrose 5%. 1000 milliLiter(s) (50 mL/Hr) IV Continuous <Continuous>  dextrose 5%. 1000 milliLiter(s) (100 mL/Hr) IV Continuous <Continuous>  dextrose 50% Injectable 25 Gram(s) IV Push once  dextrose 50% Injectable 12.5 Gram(s) IV Push once  dextrose 50% Injectable 25 Gram(s) IV Push once  glucagon  Injectable 1 milliGRAM(s) IntraMuscular once  heparin   Injectable 5000 Unit(s) SubCutaneous every 8 hours  insulin lispro (ADMELOG) corrective regimen sliding scale   SubCutaneous Before meals and at bedtime  pantoprazole    Tablet 40 milliGRAM(s) Oral before breakfast    MEDICATIONS  (PRN):  dextrose Oral Gel 15 Gram(s) Oral once PRN Blood Glucose LESS THAN 70 milliGRAM(s)/deciliter      LABS:                        16.5   11.48 )-----------( 208      ( 22 May 2022 05:48 )             49.1       05-22    139  |  102  |  42<H>  ----------------------------<  122<H>  3.8   |  23  |  1.61<H>    Ca    9.5      22 May 2022 05:48  Mg     2.0     05-22        PT/INR - ( 22 May 2022 05:48 )   PT: 12.1 sec;   INR: 1.02          PTT - ( 22 May 2022 05:48 )  PTT:35.0 sec    CARDIAC MARKERS ( 21 May 2022 16:53 )  x     / 0.02 ng/mL / 122 U/L / x     / 3.5 ng/mL        TELEMETRY:    RADIOLOGY & ADDITIONAL TESTS: Reviewed. OVERNIGHT EVENTS:  No acute events overnight.    SUBJECTIVE / INTERVAL HPI: Patient seen and examined at bedside.    Pt denies any CP, dizziness/lightheadedness, palpitations, N/V. Pt c/o of SOB this morning, but noted it was improved compared to yesterday morning. Pt agreeable with plan for cardiac cath later today          12 point ROS otherwise negative    VITAL SIGNS:  Vital Signs Last 24 Hrs  T(C): 36.4 (23 May 2022 06:16), Max: 36.4 (23 May 2022 06:16)  T(F): 97.6 (23 May 2022 06:16), Max: 97.6 (23 May 2022 06:16)  HR: 76 (23 May 2022 05:55) (58 - 84)  BP: 105/80 (23 May 2022 05:55) (92/59 - 111/70)  BP(mean): --  RR: 18 (23 May 2022 05:55) (18 - 18)  SpO2: 96% (23 May 2022 05:55) (93% - 96%)      I&O's Summary    22 May 2022 07:01  -  23 May 2022 07:00  --------------------------------------------------------  IN: 540 mL / OUT: 1250 mL / NET: -710 mL          PHYSICAL EXAM:    General: sitting up in bed, NAD  HEENT: conjunctiva clear; MMM  Neck: supple, no JVD  Cardiovascular: RRR, no murmurs  Respiratory: Rales at bases L>R improvement since yesterday    Gastrointestinal: soft, NT/ND, +BS  Extremities: WWP, no edema or cyanosis  Vascular: Peripheral pulses palpable 2+ bilaterally/ carotid 2+ b/l, no bruits; radial 2+ b/l; femoral 2+ b/l no bruits; DP/PT 2+ b/l  Neurological: AAOx3, no focal deficits    MEDICATIONS:  MEDICATIONS  (STANDING):  allopurinol 75 milliGRAM(s) Oral daily  aMIOdarone    Tablet 400 milliGRAM(s) Oral every 12 hours  aspirin enteric coated 81 milliGRAM(s) Oral daily  atorvastatin 80 milliGRAM(s) Oral at bedtime  carvedilol 12.5 milliGRAM(s) Oral every 12 hours  clopidogrel Tablet 75 milliGRAM(s) Oral daily  dextrose 5%. 1000 milliLiter(s) (50 mL/Hr) IV Continuous <Continuous>  dextrose 5%. 1000 milliLiter(s) (100 mL/Hr) IV Continuous <Continuous>  dextrose 50% Injectable 25 Gram(s) IV Push once  dextrose 50% Injectable 12.5 Gram(s) IV Push once  dextrose 50% Injectable 25 Gram(s) IV Push once  glucagon  Injectable 1 milliGRAM(s) IntraMuscular once  heparin   Injectable 5000 Unit(s) SubCutaneous every 8 hours  insulin lispro (ADMELOG) corrective regimen sliding scale   SubCutaneous Before meals and at bedtime  pantoprazole    Tablet 40 milliGRAM(s) Oral before breakfast    MEDICATIONS  (PRN):  dextrose Oral Gel 15 Gram(s) Oral once PRN Blood Glucose LESS THAN 70 milliGRAM(s)/deciliter      LABS:                        16.5   11.48 )-----------( 208      ( 22 May 2022 05:48 )             49.1       05-22    139  |  102  |  42<H>  ----------------------------<  122<H>  3.8   |  23  |  1.61<H>    Ca    9.5      22 May 2022 05:48  Mg     2.0     05-22        PT/INR - ( 22 May 2022 05:48 )   PT: 12.1 sec;   INR: 1.02          PTT - ( 22 May 2022 05:48 )  PTT:35.0 sec    CARDIAC MARKERS ( 21 May 2022 16:53 )  x     / 0.02 ng/mL / 122 U/L / x     / 3.5 ng/mL        TELEMETRY: V paced with some couplets     RADIOLOGY & ADDITIONAL TESTS: Reviewed.

## 2022-05-24 LAB
ALBUMIN SERPL ELPH-MCNC: 4.1 G/DL — SIGNIFICANT CHANGE UP (ref 3.3–5)
ALP SERPL-CCNC: 67 U/L — SIGNIFICANT CHANGE UP (ref 40–120)
ALT FLD-CCNC: 15 U/L — SIGNIFICANT CHANGE UP (ref 10–45)
ANION GAP SERPL CALC-SCNC: 12 MMOL/L — SIGNIFICANT CHANGE UP (ref 5–17)
ANION GAP SERPL CALC-SCNC: 14 MMOL/L — SIGNIFICANT CHANGE UP (ref 5–17)
APTT BLD: 42.7 SEC — HIGH (ref 27.5–35.5)
AST SERPL-CCNC: 24 U/L — SIGNIFICANT CHANGE UP (ref 10–40)
BILIRUB SERPL-MCNC: 2.4 MG/DL — HIGH (ref 0.2–1.2)
BUN SERPL-MCNC: 48 MG/DL — HIGH (ref 7–23)
BUN SERPL-MCNC: 49 MG/DL — HIGH (ref 7–23)
CALCIUM SERPL-MCNC: 9.6 MG/DL — SIGNIFICANT CHANGE UP (ref 8.4–10.5)
CALCIUM SERPL-MCNC: 9.7 MG/DL — SIGNIFICANT CHANGE UP (ref 8.4–10.5)
CHLORIDE SERPL-SCNC: 102 MMOL/L — SIGNIFICANT CHANGE UP (ref 96–108)
CHLORIDE SERPL-SCNC: 103 MMOL/L — SIGNIFICANT CHANGE UP (ref 96–108)
CO2 SERPL-SCNC: 22 MMOL/L — SIGNIFICANT CHANGE UP (ref 22–31)
CO2 SERPL-SCNC: 24 MMOL/L — SIGNIFICANT CHANGE UP (ref 22–31)
CREAT SERPL-MCNC: 1.79 MG/DL — HIGH (ref 0.5–1.3)
CREAT SERPL-MCNC: 1.82 MG/DL — HIGH (ref 0.5–1.3)
EGFR: 41 ML/MIN/1.73M2 — LOW
EGFR: 42 ML/MIN/1.73M2 — LOW
GLUCOSE BLDC GLUCOMTR-MCNC: 108 MG/DL — HIGH (ref 70–99)
GLUCOSE BLDC GLUCOMTR-MCNC: 123 MG/DL — HIGH (ref 70–99)
GLUCOSE BLDC GLUCOMTR-MCNC: 125 MG/DL — HIGH (ref 70–99)
GLUCOSE BLDC GLUCOMTR-MCNC: 130 MG/DL — HIGH (ref 70–99)
GLUCOSE SERPL-MCNC: 124 MG/DL — HIGH (ref 70–99)
GLUCOSE SERPL-MCNC: 133 MG/DL — HIGH (ref 70–99)
HCT VFR BLD CALC: 50.4 % — HIGH (ref 39–50)
HCT VFR BLD CALC: 51 % — HIGH (ref 39–50)
HGB BLD-MCNC: 16.7 G/DL — SIGNIFICANT CHANGE UP (ref 13–17)
HGB BLD-MCNC: 17 G/DL — SIGNIFICANT CHANGE UP (ref 13–17)
INR BLD: 1.12 — SIGNIFICANT CHANGE UP (ref 0.88–1.16)
ISTAT ARTERIAL BE: 1 MMOL/L — SIGNIFICANT CHANGE UP (ref -2–3)
ISTAT ARTERIAL GLUCOSE: 120 MG/DL — HIGH (ref 70–99)
ISTAT ARTERIAL HCO3: 25 MMOL/L — SIGNIFICANT CHANGE UP (ref 22–26)
ISTAT ARTERIAL HEMATOCRIT: 45 % — SIGNIFICANT CHANGE UP (ref 39–50)
ISTAT ARTERIAL HEMOGLOBIN: 15.3 G/DL — SIGNIFICANT CHANGE UP (ref 13–17)
ISTAT ARTERIAL IONIZED CALCIUM: 1.16 MMOL/L — SIGNIFICANT CHANGE UP (ref 1.12–1.3)
ISTAT ARTERIAL PCO2: 35 MMHG — SIGNIFICANT CHANGE UP (ref 35–45)
ISTAT ARTERIAL PH: 7.46 — HIGH (ref 7.35–7.45)
ISTAT ARTERIAL PO2: 437 MMHG — HIGH (ref 80–105)
ISTAT ARTERIAL POTASSIUM: 4.7 MMOL/L — SIGNIFICANT CHANGE UP (ref 3.5–5.3)
ISTAT ARTERIAL SO2: 100 % — HIGH (ref 95–98)
ISTAT ARTERIAL SODIUM: 139 MMOL/L — SIGNIFICANT CHANGE UP (ref 135–145)
ISTAT ARTERIAL TCO2: 26 MMOL/L — SIGNIFICANT CHANGE UP (ref 22–31)
LACTATE SERPL-SCNC: 1.8 MMOL/L — SIGNIFICANT CHANGE UP (ref 0.5–2)
MAGNESIUM SERPL-MCNC: 2.1 MG/DL — SIGNIFICANT CHANGE UP (ref 1.6–2.6)
MAGNESIUM SERPL-MCNC: 2.2 MG/DL — SIGNIFICANT CHANGE UP (ref 1.6–2.6)
MCHC RBC-ENTMCNC: 30.4 PG — SIGNIFICANT CHANGE UP (ref 27–34)
MCHC RBC-ENTMCNC: 30.5 PG — SIGNIFICANT CHANGE UP (ref 27–34)
MCHC RBC-ENTMCNC: 33.1 GM/DL — SIGNIFICANT CHANGE UP (ref 32–36)
MCHC RBC-ENTMCNC: 33.3 GM/DL — SIGNIFICANT CHANGE UP (ref 32–36)
MCV RBC AUTO: 91.6 FL — SIGNIFICANT CHANGE UP (ref 80–100)
MCV RBC AUTO: 91.6 FL — SIGNIFICANT CHANGE UP (ref 80–100)
NRBC # BLD: 0 /100 WBCS — SIGNIFICANT CHANGE UP (ref 0–0)
NRBC # BLD: 0 /100 WBCS — SIGNIFICANT CHANGE UP (ref 0–0)
NT-PROBNP SERPL-SCNC: 2846 PG/ML — HIGH (ref 0–300)
PHOSPHATE SERPL-MCNC: 4.5 MG/DL — SIGNIFICANT CHANGE UP (ref 2.5–4.5)
PLATELET # BLD AUTO: 200 K/UL — SIGNIFICANT CHANGE UP (ref 150–400)
PLATELET # BLD AUTO: 237 K/UL — SIGNIFICANT CHANGE UP (ref 150–400)
POTASSIUM SERPL-MCNC: 4.5 MMOL/L — SIGNIFICANT CHANGE UP (ref 3.5–5.3)
POTASSIUM SERPL-MCNC: 4.6 MMOL/L — SIGNIFICANT CHANGE UP (ref 3.5–5.3)
POTASSIUM SERPL-SCNC: 4.5 MMOL/L — SIGNIFICANT CHANGE UP (ref 3.5–5.3)
POTASSIUM SERPL-SCNC: 4.6 MMOL/L — SIGNIFICANT CHANGE UP (ref 3.5–5.3)
PROT SERPL-MCNC: 7.4 G/DL — SIGNIFICANT CHANGE UP (ref 6–8.3)
PROTHROM AB SERPL-ACNC: 13.4 SEC — SIGNIFICANT CHANGE UP (ref 10.5–13.4)
RBC # BLD: 5.5 M/UL — SIGNIFICANT CHANGE UP (ref 4.2–5.8)
RBC # BLD: 5.57 M/UL — SIGNIFICANT CHANGE UP (ref 4.2–5.8)
RBC # FLD: 16.6 % — HIGH (ref 10.3–14.5)
RBC # FLD: 16.6 % — HIGH (ref 10.3–14.5)
SODIUM SERPL-SCNC: 138 MMOL/L — SIGNIFICANT CHANGE UP (ref 135–145)
SODIUM SERPL-SCNC: 139 MMOL/L — SIGNIFICANT CHANGE UP (ref 135–145)
WBC # BLD: 11.77 K/UL — HIGH (ref 3.8–10.5)
WBC # BLD: 9.99 K/UL — SIGNIFICANT CHANGE UP (ref 3.8–10.5)
WBC # FLD AUTO: 11.77 K/UL — HIGH (ref 3.8–10.5)
WBC # FLD AUTO: 9.99 K/UL — SIGNIFICANT CHANGE UP (ref 3.8–10.5)

## 2022-05-24 PROCEDURE — 99232 SBSQ HOSP IP/OBS MODERATE 35: CPT | Mod: 25

## 2022-05-24 PROCEDURE — 93462 L HRT CATH TRNSPTL PUNCTURE: CPT

## 2022-05-24 PROCEDURE — 93654 COMPRE EP EVAL TX VT: CPT

## 2022-05-24 PROCEDURE — 99233 SBSQ HOSP IP/OBS HIGH 50: CPT

## 2022-05-24 PROCEDURE — 71045 X-RAY EXAM CHEST 1 VIEW: CPT | Mod: 26

## 2022-05-24 PROCEDURE — 93662 INTRACARDIAC ECG (ICE): CPT | Mod: 26

## 2022-05-24 RX ORDER — SACUBITRIL AND VALSARTAN 24; 26 MG/1; MG/1
1 TABLET, FILM COATED ORAL
Refills: 0 | Status: DISCONTINUED | OUTPATIENT
Start: 2022-05-24 | End: 2022-05-25

## 2022-05-24 RX ORDER — DAPAGLIFLOZIN 10 MG/1
10 TABLET, FILM COATED ORAL EVERY 24 HOURS
Refills: 0 | Status: DISCONTINUED | OUTPATIENT
Start: 2022-05-24 | End: 2022-05-24

## 2022-05-24 RX ORDER — DAPAGLIFLOZIN 10 MG/1
5 TABLET, FILM COATED ORAL DAILY
Refills: 0 | Status: DISCONTINUED | OUTPATIENT
Start: 2022-05-24 | End: 2022-05-24

## 2022-05-24 RX ORDER — FUROSEMIDE 40 MG
40 TABLET ORAL DAILY
Refills: 0 | Status: DISCONTINUED | OUTPATIENT
Start: 2022-05-24 | End: 2022-05-25

## 2022-05-24 RX ADMIN — RANOLAZINE 500 MILLIGRAM(S): 500 TABLET, FILM COATED, EXTENDED RELEASE ORAL at 22:18

## 2022-05-24 RX ADMIN — SACUBITRIL AND VALSARTAN 1 TABLET(S): 24; 26 TABLET, FILM COATED ORAL at 17:33

## 2022-05-24 RX ADMIN — CARVEDILOL PHOSPHATE 12.5 MILLIGRAM(S): 80 CAPSULE, EXTENDED RELEASE ORAL at 06:50

## 2022-05-24 RX ADMIN — AMIODARONE HYDROCHLORIDE 400 MILLIGRAM(S): 400 TABLET ORAL at 17:33

## 2022-05-24 RX ADMIN — Medication 81 MILLIGRAM(S): at 17:32

## 2022-05-24 RX ADMIN — Medication 75 MILLIGRAM(S): at 17:32

## 2022-05-24 RX ADMIN — Medication 40 MILLIGRAM(S): at 17:33

## 2022-05-24 RX ADMIN — ATORVASTATIN CALCIUM 80 MILLIGRAM(S): 80 TABLET, FILM COATED ORAL at 22:18

## 2022-05-24 RX ADMIN — AMIODARONE HYDROCHLORIDE 400 MILLIGRAM(S): 400 TABLET ORAL at 05:58

## 2022-05-24 RX ADMIN — PANTOPRAZOLE SODIUM 40 MILLIGRAM(S): 20 TABLET, DELAYED RELEASE ORAL at 05:54

## 2022-05-24 RX ADMIN — SPIRONOLACTONE 25 MILLIGRAM(S): 25 TABLET, FILM COATED ORAL at 06:50

## 2022-05-24 RX ADMIN — CLOPIDOGREL BISULFATE 75 MILLIGRAM(S): 75 TABLET, FILM COATED ORAL at 17:32

## 2022-05-24 RX ADMIN — Medication 40 MILLIGRAM(S): at 05:52

## 2022-05-24 RX ADMIN — RANOLAZINE 500 MILLIGRAM(S): 500 TABLET, FILM COATED, EXTENDED RELEASE ORAL at 05:54

## 2022-05-24 RX ADMIN — CARVEDILOL PHOSPHATE 12.5 MILLIGRAM(S): 80 CAPSULE, EXTENDED RELEASE ORAL at 22:18

## 2022-05-24 RX ADMIN — HEPARIN SODIUM 5000 UNIT(S): 5000 INJECTION INTRAVENOUS; SUBCUTANEOUS at 05:53

## 2022-05-24 RX ADMIN — HEPARIN SODIUM 5000 UNIT(S): 5000 INJECTION INTRAVENOUS; SUBCUTANEOUS at 22:18

## 2022-05-24 NOTE — CHART NOTE - NSCHARTNOTEFT_GEN_A_CORE
[employer] G[employer]  [id number]    PROCEDURE:  EP study  Attempt at VT ablation  Right Heart Cath.      INDICATION:  VT  ICD therapy  Mixed cardiomyopathy      ELECTROPHYSIOLOGIST(S):  Dr. Coombs   Fellow Dr. Anaya      ANESTHESIOLOGY:  GA, local      FINDINGS:  - RFV access x1 (trans-septal and HD Grid), LFV access x2 (ICE and RV quad)  - trans-septal puncture, LV endocardial voltage and ILAM map did not show any endocardial substrate, no ablations performed, we did not induce VT due to the severely reduced LVEF  - ICE showed severely reduced LVEF 10-15%   - RHC performed and showed elevated filling pressures  - ICE showed no effusion  - VASCADE MVP x3 for hemostasis      COMPLICATIONS:  none      RECOMMENDATIONS:  - monitor b/l groins  - HF consult  - ?work up for non ischemic etiology

## 2022-05-24 NOTE — DIETITIAN INITIAL EVALUATION ADULT - OTHER INFO
66 Y M with PMH HTN, HLD, DM II, HFrEF (EF 25-30% by ECHO), complete heart block (S/P PPM implant 3/2006, s/p upgrade to BiV-ICD 09/2016), CAD (s/p BERNABE x1 [facility]AD [facility] 4[facility]) who referred from Dr Hess’s office endorsing intermittent episodes of near syncope with palpitations and SOB since his last cath. Interrogation showing back-to-back episodes of VT @ 200+ bpm all terminating with ATP. Since last cath pt has had 41 VT episodes (all falling into VF zone). Pt admitted to cardiac tele with plan for cardiac cath, initiation of amiodarone and possible EPS/VT ablation next week, acute on chronic HFrEF exacerbation s/p IV diuresis. S/p Cath 5/23, Pending possible EPS/VT ablation if cath unremarkable.      Pt seen alert in room on 5ur. NPO at this time, reports on/off NPO during admission, +hunger at this time. Good PO PTA. Pt/wife will do cooking. Denies salt use. Likes fruit, will eat WW bread. Does not used canned, mostly fresh foods. Denies wt changes, admit wt of 164 pounds consistent with reported UBW. Daily wts in ~160 pound range. NKFA. No pain. Bebo 19. No edema or pressure ulcers. +BM 5/22.   Please see below for nutritions recommendations.

## 2022-05-24 NOTE — DIETITIAN INITIAL EVALUATION ADULT - PERTINENT MEDS FT
MEDICATIONS  (STANDING):  allopurinol 75 milliGRAM(s) Oral daily  aMIOdarone    Tablet 400 milliGRAM(s) Oral every 12 hours  aspirin enteric coated 81 milliGRAM(s) Oral daily  atorvastatin 80 milliGRAM(s) Oral at bedtime  carvedilol 12.5 milliGRAM(s) Oral every 12 hours  clopidogrel Tablet 75 milliGRAM(s) Oral daily  dapagliflozin 5 milliGRAM(s) Oral daily  dextrose 5%. 1000 milliLiter(s) (50 mL/Hr) IV Continuous <Continuous>  dextrose 5%. 1000 milliLiter(s) (100 mL/Hr) IV Continuous <Continuous>  dextrose 50% Injectable 25 Gram(s) IV Push once  dextrose 50% Injectable 12.5 Gram(s) IV Push once  dextrose 50% Injectable 25 Gram(s) IV Push once  glucagon  Injectable 1 milliGRAM(s) IntraMuscular once  heparin   Injectable 5000 Unit(s) SubCutaneous every 8 hours  insulin lispro (ADMELOG) corrective regimen sliding scale   SubCutaneous Before meals and at bedtime  pantoprazole    Tablet 40 milliGRAM(s) Oral before breakfast  ranolazine 500 milliGRAM(s) Oral two times a day  sacubitril 24 mG/valsartan 26 mG 1 Tablet(s) Oral two times a day  spironolactone 25 milliGRAM(s) Oral daily    MEDICATIONS  (PRN):  dextrose Oral Gel 15 Gram(s) Oral once PRN Blood Glucose LESS THAN 70 milliGRAM(s)/deciliter

## 2022-05-24 NOTE — PROGRESS NOTE ADULT - ASSESSMENT
67 y/o male w/ PMHx HTN, HLD, type 2 DM, chronic HFrEF (EF 25-30% by Echo), complete heart block s/p PPM (in 2006, upgraded to BiV-ICD in 09/2016), CAD (s/p recent BERNABE mid LAD at [facility] on 04/18/2022), and stage II CKD (baseline Cr ~1.3) who was referred to Dr. Hess due to intermittent episodes of near syncipe w/ palpitations and SOB since his recent cardiac  catheterization, and found to have numerous, back to back episodes of V-tach on device interrogation, subsequently referred to [facility] for admission to cardiology/telemetry. Patient is s/p diagnostic cardiac catheterization on 05/23/2022, initiated on Ranexa for slow flow found on cath, reinitiated on prior outpatient Entresto dose as well as newly initiated on Farxiga 10 mg daily (confirmed w/ pharmacy $0 copay but MUST be sent for 90 day supply). Patient also initiated on Amiodarone loading, given intermittent IV diuretics and subsequently reinitiated on home dose Lasix 40 mg PO daily. Patient taken for EP study and possible ablation w/ EP 05/24/2022 and transferred to CCU post-procedure for close monitoring.   67 y/o male w/ PMHx HTN, HLD, type 2 DM, chronic HFrEF (EF 25-30% by Echo), complete heart block s/p PPM (in 2006, upgraded to BiV-ICD in 09/2016), CAD (s/p recent BERNABE mid LAD at [facility] on 04/18/2022), and stage II CKD (baseline Cr ~1.3) who was referred to Dr. Hess due to intermittent episodes of near syncipe w/ palpitations and SOB since his recent cardiac  catheterization, and found to have numerous, back to back episodes of V-tach on device interrogation, subsequently referred to [facility] for admission to cardiology/telemetry. Patient is s/p diagnostic cardiac catheterization on 05/23/2022, initiated on Ranexa for slow flow found on cath, reinitiated on prior outpatient Entresto dose as well as newly initiated on Farxiga 10 mg daily (confirmed w/ pharmacy $0 copay but MUST be sent for 90 day supply). Patient also initiated on Amiodarone loading, given intermittent IV diuretics and subsequently reinitiated on home dose Lasix 40 mg PO daily. Patient taken for EP study and possible ablation w/ EP 05/24/2022 and transferred to CCU post-procedure for close monitoring.      NEURO  AAOx3    CARDIO      PULM    GI    /RENAL    ENDO    HEME    DISPO   67 y/o male w/ PMHx HTN, HLD, type 2 DM, chronic HFrEF (EF 25-30% by Echo), complete heart block s/p PPM (in 2006, upgraded to BiV-ICD in 09/2016), CAD (s/p recent BERNABE mid LAD at [facility] on 04/18/2022), and stage II CKD (baseline Cr ~1.3) who was referred to Dr. Hess due to intermittent episodes of near syncipe w/ palpitations and SOB since his recent cardiac  catheterization, and found to have numerous, back to back episodes of V-tach on device interrogation, subsequently referred to [facility] for admission to cardiology/telemetry. Patient is s/p diagnostic cardiac catheterization on 05/23/2022, initiated on Ranexa for slow flow found on cath, reinitiated on prior outpatient Entresto dose as well as newly initiated on Farxiga 10 mg daily (confirmed w/ pharmacy $0 copay but MUST be sent for 90 day supply). Patient also initiated on Amiodarone loading, given intermittent IV diuretics and subsequently reinitiated on home dose Lasix 40 mg PO daily. Patient taken for EP study and possible ablation w/ EP 05/24/2022 and transferred to CCU post-procedure for close monitoring.      NEURO  AAOx3    CARDIO  #VTach s/p EPS and ablation 5/24  Intermittent couplets/PVCs, NSVT x4, intermittently A-V paced on tele, EP following  - Interrogation of ICD @Dr Wilson's office 5/20: back-to-back episodes of VT @ 200+ bpm all terminating with ATP. Since last cath pt has had 41 VT episodes (all falling into VF zone)  - Normal device function. BIV pacing 97%. No programming changes made  Plan:  - c/w home Coreg 12.5 mg BID. c/w Amiodarone 400 mg PO BID per EP recommendations.  -  on Ranexa 500 mg BID for slow flow per Dr Booth    #CAD (coronary artery disease).    Chest pain free and complaint with DAPT since last cath 4/18/22  - Trop flat 0.04 x2 CK, D-dimer normal -184  - Cardiac cath @[facility] 4/18/22: mLAD 90% s/p BERNABE, LCx mild disease, RCA mild disease.  - ECHO (03/2022, per MD note): mild LV dilation, EF 25-30%, mild RV dilation, trace AI/MR.   - ECHO 5/21/22: LVEF 10-15%, abnormal septal motion seen due to RV pacing, remaining segments demonstrate severe hypokinesis, D- shaped flattening of the interventricular septum due to RV pressure overload, normal RV size, reduced RVSF, dilated RA, mild AI/AS, pulm HTN (PASP 51 mmHg), mildly dilated ascending aorta, based on TTE 2016 the LVEF is lower is RV function is now reduced.   - s/p diagnostic cardiac cath w/ Dr Wagner on 05/23/2022  Plan:  - c/w Ecotrin 81 mg PO QD  - c/w Plavix 75 mg PO QD  - c/w Atorvastatin increased to 80 mg QHS  - initiated on Ranexa 500 mg BID for slow flow found in LAD     #Acute on chronic HFrEF (heart failure with reduced ejection fraction).   Bibasilar rales L>R, +JVD, saturating well on RA  - ECHO 5/21/22 with LVEF 10-15% (reduced from 25-30% 3/2022)  Plan:   - Restarted Home Lasix 40 mg PO daily  - Started spironolactone 25mg daily  - Reinitiated Entresto 24-26 mg PO BID, will monitor side effects as pt was light headed on Entresto in the past   - initiated on Farxiga 10 mg daily (confirmed w/ pharmacy $0 copay but MUST be sent 90 day supply)  - c/w Coreg 12.5 mg PO BID  - Net negative 2.5 L since admission   - Core measures, 1.5L fluid restriction, strict I&Os, daily weights.    #Hyperlipidemia.   - , , HDL 29, LDL 84  - Home Atorvastatin increased to 80 mg PO qHS    PULM    GI    /RENAL  #JAGRUTI (acute kidney injury) CKD II  baseline Cr ~1.3  - Cr today 1.79, possible cardiorenal  - Avoid nephrotoxic agents, NSAIDs. Renally dose meds.    MSK  #Acute gout   TTP to R great toe on admit, s/p prednisone 40 mg PO x1 with resolution  Serum uric acid 14.1 5/22  Plan:   - restarted on allopurinol 75mg daily (due to CrCL-home dose 100mg).    ENDO  #T2DM  - On farxiga currently (for HF)  - no ISS started yet    HEME  No acute issues    DISPO  DVT F: Oral intake  E: Replete electrolytes as needed for K<4 and Mg<2  N: DASH Diet  DVT PPX: SQ heparin  Dispo: transfer to CCU  67 y/o male w/ PMHx HTN, HLD, type 2 DM, chronic HFrEF (EF 25-30% by Echo), complete heart block s/p PPM (in 2006, upgraded to BiV-ICD in 09/2016), CAD (s/p recent BERNABE mid LAD at [facility] on 04/18/2022), and stage II CKD (baseline Cr ~1.3) who was referred to Dr. Hess due to intermittent episodes of near syncipe w/ palpitations and SOB since his recent cardiac  catheterization, and found to have numerous, back to back episodes of V-tach on device interrogation, subsequently referred to [facility] for admission to cardiology/telemetry. Patient is s/p diagnostic cardiac catheterization on 05/23/2022, initiated on Ranexa for slow flow found on cath, reinitiated on prior outpatient Entresto dose as well as newly initiated on Farxiga 10 mg daily (confirmed w/ pharmacy $0 copay but MUST be sent for 90 day supply). Patient also initiated on Amiodarone loading, given intermittent IV diuretics and subsequently reinitiated on home dose Lasix 40 mg PO daily. Patient taken for EP study and possible ablation w/ EP 05/24/2022 and transferred to CCU post-procedure for close monitoring.      NEURO  AAOx3    CARDIO  #VTach s/p EPS and ablation 5/24  Intermittent couplets/PVCs, NSVT x4, intermittently A-V paced on tele, EP following  - Interrogation of ICD @Dr Wilson's office 5/20: back-to-back episodes of VT @ 200+ bpm all terminating with ATP. Since last cath pt has had 41 VT episodes (all falling into VF zone)  - Normal device function. BIV pacing 97%. No programming changes made  Plan:  - c/w home Coreg 12.5 mg BID. c/w Amiodarone 400 mg PO BID per EP recommendations.  -  on Ranexa 500 mg BID for slow flow per Dr Booth    #CAD (coronary artery disease).    Chest pain free and complaint with DAPT since last cath 4/18/22  - Trop flat 0.04 x2 CK, D-dimer normal -184  - Cardiac cath @[facility] 4/18/22: mLAD 90% s/p BERNABE, LCx mild disease, RCA mild disease.  - ECHO (03/2022, per MD note): mild LV dilation, EF 25-30%, mild RV dilation, trace AI/MR.   - ECHO 5/21/22: LVEF 10-15%, abnormal septal motion seen due to RV pacing, remaining segments demonstrate severe hypokinesis, D- shaped flattening of the interventricular septum due to RV pressure overload, normal RV size, reduced RVSF, dilated RA, mild AI/AS, pulm HTN (PASP 51 mmHg), mildly dilated ascending aorta, based on TTE 2016 the LVEF is lower is RV function is now reduced.   - s/p diagnostic cardiac cath w/ Dr Wagner on 05/23/2022  Plan:  - c/w Ecotrin 81 mg PO QD  - c/w Plavix 75 mg PO QD  - c/w Atorvastatin increased to 80 mg QHS  - initiated on Ranexa 500 mg BID for slow flow found in LAD     #Acute on chronic HFrEF (heart failure with reduced ejection fraction).   Bibasilar rales L>R, +JVD, saturating well on RA  - ECHO 5/21/22 with LVEF 10-15% (reduced from 25-30% 3/2022)  Plan:   - Restarted Home Lasix 40 mg PO daily  - Started spironolactone 25mg daily  - Reinitiated Entresto 24-26 mg PO BID, will monitor side effects as pt was light headed on Entresto in the past   - initiated on Farxiga 10 mg daily (confirmed w/ pharmacy $0 copay but MUST be sent 90 day supply)  - c/w Coreg 12.5 mg PO BID  - Net negative 2.5 L since admission   - Core measures, 1.5L fluid restriction, strict I&Os, daily weights.    #Hyperlipidemia.   - , , HDL 29, LDL 84  - Home Atorvastatin increased to 80 mg PO qHS    PULM  No Acute issues.    GI  No acute issues.    /RENAL  #JAGRUTI (acute kidney injury) CKD II  baseline Cr ~1.3  - Cr today 1.79, possible cardiorenal  - Avoid nephrotoxic agents, NSAIDs. Renally dose meds.    MSK  #Acute gout   TTP to R great toe on admit, s/p prednisone 40 mg PO x1 with resolution  Serum uric acid 14.1 5/22  Plan:   - restarted on allopurinol 75mg daily (due to CrCL-home dose 100mg).    ENDO  #T2DM  - On farxiga currently (for HF)  - no ISS started yet    HEME  No acute issues    DISPO  DVT F: Oral intake  E: Replete electrolytes as needed for K<4 and Mg<2  N: DASH Diet  DVT PPX: SQ heparin  Dispo: transfer to CCU

## 2022-05-24 NOTE — PROGRESS NOTE ADULT - NS ATTEND AMEND GEN_ALL_CORE FT
66M w/ HTN, HLD, chronic sCHF(EF25-30%), CHB s/p PPM, upgraded to BI-V in 2016, CAD s/p PCI in April '22 presented to EP clinic w/ near syncope, SOB, fatigue and palpitations found to have intermittent VT, admitted to cardiac Tele for further w/u  TTE 5/21/22: Severely reduced left ventricular systolic function, LVEF 10-15% . Abnormal septal motion seen due to right ventricular pacing.   Remaining segments demonstrate severe hypokinesis. D shaped flattening of the interventricular septum due to right ventricular pressure overload.  Normal RV size, reduced RV systolic function. Dilated RA. 1+AI, 1+AS. PASP 51.  [facility] with no occlusive disease  Plan for:  NPO for EPS today 5/24  EP initiated Amiodarone 400 BID, cont BB, further recs pending [facility]  Lasix 40mg home dose given this AM 5/24, suspended for 5/25 given on boarding of farxiga  Carvedilol 12.5 BID home dose  Entresto 24/26 BID, patient willing to reattempt (was dcd as outpt due to 'intolerance')  Spironolactone 25mg daily initiated  Farxiga 10mg po daily initiated, rx sent to [facility] to check coverage  Advanced CHF consult for GDMT co-management and eval for advanced therapies  Dietician/Nutrition consult for CHF/cardiac diet reinforcement  Future planning: patient to have 1wk f/u appt made with Cardiologist Bernard/Justin for quality improvement CHF readmission risk reduction  Mallika Worthington M.D.  Cardiology Attending.

## 2022-05-24 NOTE — PROGRESS NOTE ADULT - PROBLEM SELECTOR PLAN 1
Intermittent couplets/PVCs, NSVT x4, intermittently A-V paced on tele, EP following  - Interrogation of ICD @Dr Wilson's office 5/20: back-to-back episodes of VT @ 200+ bpm all terminating with ATP. Since last cath pt has had 41 VT episodes (all falling into VF zone)  - Normal device function. BIV pacing 97%. No programming changes made  - Plan for cardiac cath on Monday followed by EPS/VT if cath unremarkable  - c/w home Coreg 12.5 mg BID. c/w Amiodarone 400 mg PO BID per EP recommendations.  - Tele monitoring  - planned for EP study and possible ablation w/ EP and planned for transfer to CCU post-procedure as requested by EP

## 2022-05-24 NOTE — PROGRESS NOTE ADULT - PROBLEM SELECTOR PLAN 2
Chest pain free and complaint with DAPT since last cath 4/18/22  - Trop flat 0.04 x2 CK, D-dimer normal -184  - Cardiac cath @[facility] 4/18/22: mLAD 90% s/p BERNABE, LCx mild disease, RCA mild disease.  - ECHO (03/2022, per MD note): mild LV dilation, EF 25-30%, mild RV dilation, trace AI/MR.   - ECHO 5/21/22: LVEF 10-15%, abnormal septal motion seen due to RV pacing, remaining segments demonstrate severe hypokinesis, D- shaped flattening of the interventricular septum due to RV pressure overload, normal RV size, reduced RVSF, dilated RA, mild AI/AS, pulm HTN (PASP 51 mmHg), mildly dilated ascending aorta, based on TTE 2016 the LVEF is lower is RV function is now reduced.   - s/p diagnostic cardiac cath w/ Dr Wagner on 05/23/2022, initiated on Ranexa 500 mg BID for slow flow found in LAD   - c/w Ecotrin 81 mg PO QD, Plavix 75 mg PO QD, Atorvastatin increased to 80 mg QHS

## 2022-05-24 NOTE — PROGRESS NOTE ADULT - PROBLEM SELECTOR PLAN 6
- , , HDL 29, LDL 84  - Home Atorvastatin increased to 80 mg PO qHS    DVT F: Oral intake  E: Replete electrolytes as needed for K<4 and Mg<2  N: DASH Diet    DVT PPX: SQH  Dispo: transfer to CCU     Case discussed w/ Dr. Medina, Dr. Kaufman

## 2022-05-24 NOTE — DIETITIAN INITIAL EVALUATION ADULT - ADD RECOMMEND
1. Diet to be advanced in 24-48hrs. DASH TLC Consistent Carbohydrate; fluids per team.  2. Monitor PO intake/appetite, GI distress, diet tolerance, labs, weights.  3. Honor pt food preferences as able.  4. RD to remain available for additional nutrition interventions as needed.   ** Moderate Nutrition Risk.

## 2022-05-24 NOTE — PROGRESS NOTE ADULT - SUBJECTIVE AND OBJECTIVE BOX
INCOMPLETE    OVERNIGHT EVENTS:  No acute events overnight.    SUBJECTIVE / INTERVAL HPI: Patient seen and examined at bedside.    Denies CP, SOB, dizziness/lightheadedness, palpitations    12 point ROS otherwise negative    VITAL SIGNS:  Vital Signs Last 24 Hrs  T(C): 36 (24 May 2022 05:01), Max: 36.1 (23 May 2022 09:58)  T(F): 96.8 (24 May 2022 05:01), Max: 96.9 (23 May 2022 09:58)  HR: 59 (24 May 2022 08:33) (59 - 143)  BP: 93/65 (24 May 2022 08:33) (85/67 - 123/84)  BP(mean): --  RR: 18 (24 May 2022 08:33) (18 - 18)  SpO2: 93% (24 May 2022 08:33) (90% - 99%)      I&O's Summary    23 May 2022 07:01  -  24 May 2022 07:00  --------------------------------------------------------  IN: 240 mL / OUT: 775 mL / NET: -535 mL          PHYSICAL EXAM:    General: sitting up in bed, NAD  HEENT: conjunctiva clear; MMM  Neck: supple, no JVD  Cardiovascular: RRR, no murmurs  Respiratory: CTA B/L  Gastrointestinal: soft, NT/ND, +BS  Extremities: WWP, no edema or cyanosis  Vascular: Peripheral pulses palpable 2+ bilaterally/ carotid 2+ b/l, no bruits; radial 2+ b/l; femoral 2+ b/l no bruits; DP/PT 2+ b/l  Neurological: AAOx3, no focal deficits    MEDICATIONS:  MEDICATIONS  (STANDING):  allopurinol 75 milliGRAM(s) Oral daily  aMIOdarone    Tablet 400 milliGRAM(s) Oral every 12 hours  aspirin enteric coated 81 milliGRAM(s) Oral daily  atorvastatin 80 milliGRAM(s) Oral at bedtime  carvedilol 12.5 milliGRAM(s) Oral every 12 hours  clopidogrel Tablet 75 milliGRAM(s) Oral daily  dextrose 5%. 1000 milliLiter(s) (50 mL/Hr) IV Continuous <Continuous>  dextrose 5%. 1000 milliLiter(s) (100 mL/Hr) IV Continuous <Continuous>  dextrose 50% Injectable 25 Gram(s) IV Push once  dextrose 50% Injectable 12.5 Gram(s) IV Push once  dextrose 50% Injectable 25 Gram(s) IV Push once  glucagon  Injectable 1 milliGRAM(s) IntraMuscular once  heparin   Injectable 5000 Unit(s) SubCutaneous every 8 hours  insulin lispro (ADMELOG) corrective regimen sliding scale   SubCutaneous Before meals and at bedtime  pantoprazole    Tablet 40 milliGRAM(s) Oral before breakfast  ranolazine 500 milliGRAM(s) Oral two times a day  sacubitril 24 mG/valsartan 26 mG 1 Tablet(s) Oral two times a day  spironolactone 25 milliGRAM(s) Oral daily    MEDICATIONS  (PRN):  dextrose Oral Gel 15 Gram(s) Oral once PRN Blood Glucose LESS THAN 70 milliGRAM(s)/deciliter      LABS:                        17.0   9.99  )-----------( 237      ( 24 May 2022 06:33 )             51.0       05-24    138  |  102  |  48<H>  ----------------------------<  133<H>  4.6   |  24  |  1.79<H>    Ca    9.7      24 May 2022 06:33  Mg     2.2     05-24                  TELEMETRY:    RADIOLOGY & ADDITIONAL TESTS: Reviewed. INCOMPLETE    OVERNIGHT EVENTS:  No acute events overnight.    SUBJECTIVE / INTERVAL HPI: Patient seen and examined at bedside.    Denies CP, SOB, dizziness/lightheadedness, palpitationsdf    12 point ROS otherwise negative    VITAL SIGNS:  Vital Signs Last 24 Hrs  T(C): 36 (24 May 2022 05:01), Max: 36.1 (23 May 2022 09:58)  T(F): 96.8 (24 May 2022 05:01), Max: 96.9 (23 May 2022 09:58)  HR: 59 (24 May 2022 08:33) (59 - 143)  BP: 93/65 (24 May 2022 08:33) (85/67 - 123/84)  BP(mean): --  RR: 18 (24 May 2022 08:33) (18 - 18)  SpO2: 93% (24 May 2022 08:33) (90% - 99%)      I&O's Summary    23 May 2022 07:01  -  24 May 2022 07:00  --------------------------------------------------------  IN: 240 mL / OUT: 775 mL / NET: -535 mL          PHYSICAL EXAM:    General: sitting up in bed, NAD  HEENT: conjunctiva clear; MMM  Neck: supple, no JVD  Cardiovascular: RRR, no murmurs  Respiratory: CTA B/L  Gastrointestinal: soft, NT/ND, +BS  Extremities: WWP, no edema or cyanosis  Vascular: Peripheral pulses palpable 2+ bilaterally/ carotid 2+ b/l, no bruits; radial 2+ b/l; femoral 2+ b/l no bruits; DP/PT 2+ b/l  Neurological: AAOx3, no focal deficits    MEDICATIONS:  MEDICATIONS  (STANDING):  allopurinol 75 milliGRAM(s) Oral daily  aMIOdarone    Tablet 400 milliGRAM(s) Oral every 12 hours  aspirin enteric coated 81 milliGRAM(s) Oral daily  atorvastatin 80 milliGRAM(s) Oral at bedtime  carvedilol 12.5 milliGRAM(s) Oral every 12 hours  clopidogrel Tablet 75 milliGRAM(s) Oral daily  dextrose 5%. 1000 milliLiter(s) (50 mL/Hr) IV Continuous <Continuous>  dextrose 5%. 1000 milliLiter(s) (100 mL/Hr) IV Continuous <Continuous>  dextrose 50% Injectable 25 Gram(s) IV Push once  dextrose 50% Injectable 12.5 Gram(s) IV Push once  dextrose 50% Injectable 25 Gram(s) IV Push once  glucagon  Injectable 1 milliGRAM(s) IntraMuscular once  heparin   Injectable 5000 Unit(s) SubCutaneous every 8 hours  insulin lispro (ADMELOG) corrective regimen sliding scale   SubCutaneous Before meals and at bedtime  pantoprazole    Tablet 40 milliGRAM(s) Oral before breakfast  ranolazine 500 milliGRAM(s) Oral two times a day  sacubitril 24 mG/valsartan 26 mG 1 Tablet(s) Oral two times a day  spironolactone 25 milliGRAM(s) Oral daily    MEDICATIONS  (PRN):  dextrose Oral Gel 15 Gram(s) Oral once PRN Blood Glucose LESS THAN 70 milliGRAM(s)/deciliter      LABS:                        17.0   9.99  )-----------( 237      ( 24 May 2022 06:33 )             51.0       05-24    138  |  102  |  48<H>  ----------------------------<  133<H>  4.6   |  24  |  1.79<H>    Ca    9.7      24 May 2022 06:33  Mg     2.2     05-24                  TELEMETRY:    RADIOLOGY & ADDITIONAL TESTS: Reviewed. TRANSFER NOTE FROM [facility] TO CCU     HOSPITAL COURSE: 67 y/o male w/ PMHx HTN, HLD, type 2 DM, chronic HFrEF (EF 25-30% by Echo), complete heart block s/p PPM (in 2006, upgraded to BiV-ICD in 09/2016), CAD (s/p recent BERNABE mid LAD at [facility] on 04/18/2022), and stage II CKD (baseline Cr ~1.3) who was referred to Dr. Hess due to intermittent episodes of near syncope w/ palpitations and SOB since his recent cardiac  catheterization. At that time, patient's device was interrogated and showed back to back episodes of V-tach at 200+ bpm all terminating ATP and since his recent cath procedure patient has had 41 V-Tach episodes, all falling into V-fib zone. Patient was subsequently referred to [facility] for admission to cardiology/telemetry. Amiodarone load was initiated, patient intermittent was initially given IV diuretics for subjective shortness of breath, and subsequently reinitiated on his home medication of Lasix 40 mg PO daily. Patient's hospital course also initially complicated by gout flare, receieved Prednisone 40 mg PO x 1, and reinitiated on Allopurinol which patient takes at home; however, patient's home dose of Allopurinol 100 mg daily was decreased to Allopurinol 75 mg daily due to worsening renal function. Patient subsequently underwent cardiac catheterization on 05/23/2022 w/ Dr. Wagner which showed LM normal, proximal LAD mild disease w/ slow flow, mid LAD patent stent w/ slow flow, ostial D1 40-50% (small, unchanged), LCx/OM1 mild disease, and RCA/RPDA mild disease via right radial access. As recommended for Dr. Wagner post-procedure, patient was initiated on Ranexa 500 mg BID for slow flow. Patient was also reinitiated on outpatient prior outpatient Entresto dose as well as initiated on Farxiga (confirmed w/ pharmacy $0 copay but MUST be sent for 90 day supply). Patient subsequently was taken for EPS and possible ablation w/ EP on 05/24/2022, and transferred to CCU post-procedure for closer monitoring.       VITAL SIGNS:  Vital Signs Last 24 Hrs  T(C): 36 (24 May 2022 05:01), Max: 36.1 (23 May 2022 09:58)  T(F): 96.8 (24 May 2022 05:01), Max: 96.9 (23 May 2022 09:58)  HR: 59 (24 May 2022 08:33) (59 - 143)  BP: 93/65 (24 May 2022 08:33) (85/67 - 123/84)  BP(mean): --  RR: 18 (24 May 2022 08:33) (18 - 18)  SpO2: 93% (24 May 2022 08:33) (90% - 99%)      I&O's Summary    23 May 2022 07:01  -  24 May 2022 07:00  --------------------------------------------------------  IN: 240 mL / OUT: 775 mL / NET: -535 mL          PHYSICAL EXAM:  General: sitting up in bed, NAD  HEENT: conjunctiva clear; MMM  Neck: supple, no JVD  Cardiovascular: RRR, no murmurs  Respiratory: CTA B/L  Gastrointestinal: soft, NT/ND, +BS  Extremities: WWP, no edema or cyanosis  Vascular: Peripheral pulses palpable 2+ bilaterally/ carotid 2+ b/l, no bruits; radial 2+ b/l; femoral 2+ b/l no bruits; DP/PT 2+ b/l  Neurological: AAOx3, no focal deficits    MEDICATIONS:  MEDICATIONS  (STANDING):  allopurinol 75 milliGRAM(s) Oral daily  aMIOdarone    Tablet 400 milliGRAM(s) Oral every 12 hours  aspirin enteric coated 81 milliGRAM(s) Oral daily  atorvastatin 80 milliGRAM(s) Oral at bedtime  carvedilol 12.5 milliGRAM(s) Oral every 12 hours  clopidogrel Tablet 75 milliGRAM(s) Oral daily  dextrose 5%. 1000 milliLiter(s) (50 mL/Hr) IV Continuous <Continuous>  dextrose 5%. 1000 milliLiter(s) (100 mL/Hr) IV Continuous <Continuous>  dextrose 50% Injectable 25 Gram(s) IV Push once  dextrose 50% Injectable 12.5 Gram(s) IV Push once  dextrose 50% Injectable 25 Gram(s) IV Push once  glucagon  Injectable 1 milliGRAM(s) IntraMuscular once  heparin   Injectable 5000 Unit(s) SubCutaneous every 8 hours  insulin lispro (ADMELOG) corrective regimen sliding scale   SubCutaneous Before meals and at bedtime  pantoprazole    Tablet 40 milliGRAM(s) Oral before breakfast  ranolazine 500 milliGRAM(s) Oral two times a day  sacubitril 24 mG/valsartan 26 mG 1 Tablet(s) Oral two times a day  spironolactone 25 milliGRAM(s) Oral daily    MEDICATIONS  (PRN):  dextrose Oral Gel 15 Gram(s) Oral once PRN Blood Glucose LESS THAN 70 milliGRAM(s)/deciliter      LABS:                        17.0   9.99  )-----------( 237      ( 24 May 2022 06:33 )             51.0       05-24    138  |  102  |  48<H>  ----------------------------<  133<H>  4.6   |  24  |  1.79<H>    Ca    9.7      24 May 2022 06:33  Mg     2.2     05-24

## 2022-05-24 NOTE — DIETITIAN INITIAL EVALUATION ADULT - OTHER CALCULATIONS
5'10''  pounds+-10%  Weight 164 pounds, BMI 23.7, %IBW98  IBW for EER D/t varying EMR wts in CHF pt  Adjust for age, CHF; fluids per team 
2 Hour(s) 17 Minute(s)

## 2022-05-24 NOTE — PROGRESS NOTE ADULT - PROBLEM SELECTOR PLAN 4
JAGRUTI on CKD II, baseline Cr ~1.3  - Cr today 1.79, possible cardiorenal  - Avoid nephrotoxic agents, NSAIDs. Renally dose meds

## 2022-05-24 NOTE — GOALS OF CARE CONVERSATION - ADVANCED CARE PLANNING - CONVERSATION DETAILS
Pt states he wants to remain FULL CODE in the event of an emergency   Pt states his goal is to be able to be more active as he enjoys walking around the rawls at home and does not want to be inactive and resort to staying home and in bed all day

## 2022-05-24 NOTE — PROGRESS NOTE ADULT - PROBLEM SELECTOR PLAN 3
Bibasilar rales L>R, +JVD, saturating well on RA  - ECHO 5/21/22 with LVEF 10-15% (reduced from 25-30% 3/2022),   - Restarted Home Lasix 40 mg PO daily, Started spironolactone 25mg daily,   - Reinitiated Entresto 24-26 mg PO BID, will monitor side effects as pt was light headed on Entresto in the past   - initiated on Farxiga 10 mg daily (confirmed w/ pharmacy $0 copay but MUST be sent 90 day supply)  - c/w Coreg 12.5 mg PO BID  - Net negative 2.5 L since admission   - Core measures, 1.5L fluid restriction, strict I&Os, daily weights

## 2022-05-24 NOTE — PROGRESS NOTE ADULT - PROBLEM SELECTOR PLAN 2
Chest pain free and complaint with DAPT since last cath 4/18/22  - Trop flat 0.04 x2 CK, D-dimer normal -184  - [facility]: mLAD 90% s/p BERNABE, LCx mild disease, RCA mild disease.  - ECHO (03/2022, per MD note): mild LV dilation, EF 25-30%, mild RV dilation, trace AI/MR.   - ECHO 5/21/22: LVEF 10-15%, abnormal septal motion seen due to RV pacing, remaining segments demonstrate severe hypokinesis, D- shaped flattening of the interventricular septum due to RV pressure overload, normal RV size, reduced RVSF, dilated RA, mild AI/AS, pulm HTN (PASP 51 mmHg), mildly dilated ascending aorta, based on TTE 2016 the LVEF is lower is RV function is now reduced.   - s/p diagnostic cardiac cath w/ Dr Wagner on 05/23/2022, initiated on Ranexa 500 mg BID for slow flow found in LAD   - c/w Ecotrin 81 mg PO QD, Plavix 75 mg PO QD, Atorvastatin increased to 80 mg QHS

## 2022-05-24 NOTE — PROGRESS NOTE ADULT - PROBLEM SELECTOR PLAN 5
TTP to R great toe on admit, s/p prednisone 40 mg PO x1 with resolution  -Serum uric acid 14.1 5/22 restarted on allopurinol 75mg daily (due to CrCL-home dose 100mg)

## 2022-05-24 NOTE — DIETITIAN INITIAL EVALUATION ADULT - NS FNS DIET ORDER
Diet, NPO after Midnight:      NPO Start Date: 23-May-2022,   NPO Start Time: 23:59 (05-23-22 @ 19:00)

## 2022-05-24 NOTE — PROGRESS NOTE ADULT - ASSESSMENT
67 y/o male w/ PMHx HTN, HLD, type 2 DM, chronic HFrEF (EF 25-30% by Echo), complete heart block s/p PPM (in 2006, upgraded to BiV-ICD in 09/2016), CAD (s/p recent BERNABE mid LAD at [facility] on 04/18/2022), and stage II CKD (baseline Cr ~1.3) who was referred to Dr. Hess due to intermittent episodes of near syncipe w/ palpitations and SOB since his recent cardiac  catheterization, and found to have numerous, back to back episodes of V-tach on device interrogation, subsequently referred to [facility] for admission to cardiology/telemetry. Patient is s/p diagnostic cardiac catheterization on 05/23/2022, initiated on Ranexa for slow flow found on cath, reinitiated on prior outpatient Entresto dose as well as newly initiated on Farxiga 10 mg daily (confirmed w/ pharmacy $0 copay but MUST be sent for 90 day supply). Patient also initiated on Amiodarone loading, given intermittent IV diuretics and subsequently reinitiated on home dose Lasix 40 mg PO daily. Patient taken for EP study and possible ablation w/ EP 05/24/2022 and transferred to CCU post-procedure for close monitoring.

## 2022-05-24 NOTE — DIETITIAN INITIAL EVALUATION ADULT - PERTINENT LABORATORY DATA
05-24    138  |  102  |  48<H>  ----------------------------<  133<H>  4.6   |  24  |  1.79<H>    Ca    9.7      24 May 2022 06:33  Mg     2.2     05-24    POCT Blood Glucose.: 130 mg/dL (05-24-22 @ 11:59)  A1C with Estimated Average Glucose Result: 6.2 % (05-20-22 @ 14:17)  A1C with Estimated Average Glucose Result: 6.9 % (04-18-22 @ 15:40)  A1C with Estimated Average Glucose Result: 6.7 % (04-18-22 @ 15:05)    , , HDL 29, LDL 84    TTE 5/21/22: Severely reduced left ventricular systolic function, LVEF 10-15% . Abnormal septal motion seen due to right ventricular pacing.

## 2022-05-24 NOTE — PROGRESS NOTE ADULT - SUBJECTIVE AND OBJECTIVE BOX
******************TRANSFER FROM [facility] TO [facility]***************************  HOSPITAL COURSE: 67 y/o male w/ PMHx HTN, HLD, type 2 DM, chronic HFrEF (EF 25-30% by Echo), complete heart block s/p PPM (in 2006, upgraded to BiV-ICD in 09/2016), CAD (s/p recent BERNABE mid LAD at [facility] on 04/18/2022), and stage II CKD (baseline Cr ~1.3) who was referred to Dr. Hess due to intermittent episodes of near syncope w/ palpitations and SOB since his recent cardiac  catheterization. At that time, patient's device was interrogated and showed back to back episodes of V-tach at 200+ bpm all terminating ATP and since his recent cath procedure patient has had 41 V-Tach episodes, all falling into V-fib zone. Patient was subsequently referred to [facility] for admission to cardiology/telemetry. Amiodarone load was initiated, patient intermittent was initially given IV diuretics for subjective shortness of breath, and subsequently reinitiated on his home medication of Lasix 40 mg PO daily. Patient's hospital course also initially complicated by gout flare, receieved Prednisone 40 mg PO x 1, and reinitiated on Allopurinol which patient takes at home; however, patient's home dose of Allopurinol 100 mg daily was decreased to Allopurinol 75 mg daily due to worsening renal function. Patient subsequently underwent cardiac catheterization on 05/23/2022 w/ Dr. Wagner which showed LM normal, proximal LAD mild disease w/ slow flow, mid LAD patent stent w/ slow flow, ostial D1 40-50% (small, unchanged), LCx/OM1 mild disease, and RCA/RPDA mild disease via right radial access. As recommended for Dr. Wagner post-procedure, patient was initiated on Ranexa 500 mg BID for slow flow. Patient was also reinitiated on outpatient prior outpatient Entresto dose as well as initiated on Farxiga (confirmed w/ pharmacy $0 copay but MUST be sent for 90 day supply). Patient subsequently was taken for EPS and possible ablation w/ EP on 05/24/2022, and transferred to CCU post-procedure for closer monitoring.     OVERNIGHT EVENTS:     SUBJECTIVE:  Patient seen and examined at bedside.    Vital Signs Last 12 Hrs  T(F): 96.8 (05-24-22 @ 10:17), Max: 96.8 (05-24-22 @ 05:01)  HR: 61 (05-24-22 @ 09:31) (59 - 64)  BP: 104/69 (05-24-22 @ 09:31) (85/67 - 104/69)  BP(mean): --  RR: 18 (05-24-22 @ 08:33) (18 - 18)  SpO2: 93% (05-24-22 @ 08:33) (93% - 96%)  I&O's Summary    23 May 2022 07:01  -  24 May 2022 07:00  --------------------------------------------------------  IN: 240 mL / OUT: 775 mL / NET: -535 mL    24 May 2022 07:01  -  24 May 2022 14:52  --------------------------------------------------------  IN: 0 mL / OUT: 100 mL / NET: -100 mL        PHYSICAL EXAM:  Constitutional: NAD, comfortable in bed.  HEENT: NC/AT, PERRLA, EOMI, no conjunctival pallor or scleral icterus, MMM  Neck: Supple, no JVD  Respiratory: CTA B/L. No w/r/r.   Cardiovascular: RRR, normal S1 and S2, no m/r/g.   Gastrointestinal: +BS, soft NTND, no guarding or rebound tenderness, no palpable masses   Extremities: wwp; no cyanosis, clubbing or edema.   Vascular: Pulses equal and strong throughout.   Neurological: AAOx3, no CN deficits, strength and sensation intact throughout.   Skin: No gross skin abnormalities or rashes        LABS:                        17.0   9.99  )-----------( 237      ( 24 May 2022 06:33 )             51.0     05-24    138  |  102  |  48<H>  ----------------------------<  133<H>  4.6   |  24  |  1.79<H>    Ca    9.7      24 May 2022 06:33  Mg     2.2     05-24              RADIOLOGY & ADDITIONAL TESTS:    MEDICATIONS  (STANDING):  allopurinol 75 milliGRAM(s) Oral daily  aMIOdarone    Tablet 400 milliGRAM(s) Oral every 12 hours  aspirin enteric coated 81 milliGRAM(s) Oral daily  atorvastatin 80 milliGRAM(s) Oral at bedtime  carvedilol 12.5 milliGRAM(s) Oral every 12 hours  clopidogrel Tablet 75 milliGRAM(s) Oral daily  dapagliflozin 10 milliGRAM(s) Oral every 24 hours  dextrose 5%. 1000 milliLiter(s) (50 mL/Hr) IV Continuous <Continuous>  dextrose 5%. 1000 milliLiter(s) (100 mL/Hr) IV Continuous <Continuous>  dextrose 50% Injectable 25 Gram(s) IV Push once  dextrose 50% Injectable 12.5 Gram(s) IV Push once  dextrose 50% Injectable 25 Gram(s) IV Push once  glucagon  Injectable 1 milliGRAM(s) IntraMuscular once  heparin   Injectable 5000 Unit(s) SubCutaneous every 8 hours  insulin lispro (ADMELOG) corrective regimen sliding scale   SubCutaneous Before meals and at bedtime  pantoprazole    Tablet 40 milliGRAM(s) Oral before breakfast  ranolazine 500 milliGRAM(s) Oral two times a day  sacubitril 24 mG/valsartan 26 mG 1 Tablet(s) Oral two times a day  spironolactone 25 milliGRAM(s) Oral daily    MEDICATIONS  (PRN):  dextrose Oral Gel 15 Gram(s) Oral once PRN Blood Glucose LESS THAN 70 milliGRAM(s)/deciliter   ******************TRANSFER FROM [facility] TO [facility]***************************  HOSPITAL COURSE: 67 y/o male w/ PMHx HTN, HLD, type 2 DM, chronic HFrEF (EF 25-30% by Echo), complete heart block s/p PPM (in 2006, upgraded to BiV-ICD in 09/2016), CAD (s/p recent BERNABE mid LAD at [facility] on 04/18/2022), and stage II CKD (baseline Cr ~1.3) who was referred to Dr. Hess due to intermittent episodes of near syncope w/ palpitations and SOB since his recent cardiac  catheterization. At that time, patient's device was interrogated and showed back to back episodes of V-tach at 200+ bpm all terminating ATP and since his recent cath procedure patient has had 41 V-Tach episodes, all falling into V-fib zone. Patient was subsequently referred to [facility] for admission to cardiology/telemetry. Amiodarone load was initiated, patient intermittent was initially given IV diuretics for subjective shortness of breath, and subsequently reinitiated on his home medication of Lasix 40 mg PO daily. Patient's hospital course also initially complicated by gout flare, receieved Prednisone 40 mg PO x 1, and reinitiated on Allopurinol which patient takes at home; however, patient's home dose of Allopurinol 100 mg daily was decreased to Allopurinol 75 mg daily due to worsening renal function. Patient subsequently underwent cardiac catheterization on 05/23/2022 w/ Dr. Wagner which showed LM normal, proximal LAD mild disease w/ slow flow, mid LAD patent stent w/ slow flow, ostial D1 40-50% (small, unchanged), LCx/OM1 mild disease, and RCA/RPDA mild disease via right radial access. As recommended for Dr. Wagner post-procedure, patient was initiated on Ranexa 500 mg BID for slow flow. Patient was also reinitiated on outpatient prior outpatient Entresto dose as well as initiated on Farxiga (confirmed w/ pharmacy $0 copay but MUST be sent for 90 day supply). Patient subsequently was taken for EPS and possible ablation w/ EP on 05/24/2022, and transferred to CCU post-procedure for closer monitoring.       SUBJECTIVE:  Patient seen and examined at bedside.    Vital Signs Last 12 Hrs  T(F): 96.8 (05-24-22 @ 10:17), Max: 96.8 (05-24-22 @ 05:01)  HR: 61 (05-24-22 @ 09:31) (59 - 64)  BP: 104/69 (05-24-22 @ 09:31) (85/67 - 104/69)  BP(mean): --  RR: 18 (05-24-22 @ 08:33) (18 - 18)  SpO2: 93% (05-24-22 @ 08:33) (93% - 96%)  I&O's Summary    23 May 2022 07:01  -  24 May 2022 07:00  --------------------------------------------------------  IN: 240 mL / OUT: 775 mL / NET: -535 mL    24 May 2022 07:01  -  24 May 2022 14:52  --------------------------------------------------------  IN: 0 mL / OUT: 100 mL / NET: -100 mL        PHYSICAL EXAM:  Constitutional: NAD, comfortable in bed.  HEENT: NC/AT, PERRLA, EOMI, no conjunctival pallor or scleral icterus, MMM  Neck: Supple, no JVD  Respiratory: CTA B/L. No w/r/r.   Cardiovascular: RRR, normal S1 and S2, no m/r/g.   Gastrointestinal: +BS, soft NTND, no guarding or rebound tenderness, no palpable masses   Extremities: wwp; no cyanosis, clubbing or edema.   Vascular: Pulses equal and strong throughout.   Neurological: AAOx3, no CN deficits, strength and sensation intact throughout.   Skin: No gross skin abnormalities or rashes        LABS:                        17.0   9.99  )-----------( 237      ( 24 May 2022 06:33 )             51.0     05-24    138  |  102  |  48<H>  ----------------------------<  133<H>  4.6   |  24  |  1.79<H>    Ca    9.7      24 May 2022 06:33  Mg     2.2     05-24              RADIOLOGY & ADDITIONAL TESTS:    MEDICATIONS  (STANDING):  allopurinol 75 milliGRAM(s) Oral daily  aMIOdarone    Tablet 400 milliGRAM(s) Oral every 12 hours  aspirin enteric coated 81 milliGRAM(s) Oral daily  atorvastatin 80 milliGRAM(s) Oral at bedtime  carvedilol 12.5 milliGRAM(s) Oral every 12 hours  clopidogrel Tablet 75 milliGRAM(s) Oral daily  dapagliflozin 10 milliGRAM(s) Oral every 24 hours  dextrose 5%. 1000 milliLiter(s) (50 mL/Hr) IV Continuous <Continuous>  dextrose 5%. 1000 milliLiter(s) (100 mL/Hr) IV Continuous <Continuous>  dextrose 50% Injectable 25 Gram(s) IV Push once  dextrose 50% Injectable 12.5 Gram(s) IV Push once  dextrose 50% Injectable 25 Gram(s) IV Push once  glucagon  Injectable 1 milliGRAM(s) IntraMuscular once  heparin   Injectable 5000 Unit(s) SubCutaneous every 8 hours  insulin lispro (ADMELOG) corrective regimen sliding scale   SubCutaneous Before meals and at bedtime  pantoprazole    Tablet 40 milliGRAM(s) Oral before breakfast  ranolazine 500 milliGRAM(s) Oral two times a day  sacubitril 24 mG/valsartan 26 mG 1 Tablet(s) Oral two times a day  spironolactone 25 milliGRAM(s) Oral daily    MEDICATIONS  (PRN):  dextrose Oral Gel 15 Gram(s) Oral once PRN Blood Glucose LESS THAN 70 milliGRAM(s)/deciliter   ******************TRANSFER FROM [facility] TO [facility]***************************  HOSPITAL COURSE: 65 y/o male w/ PMHx HTN, HLD, type 2 DM, chronic HFrEF (EF 25-30% by Echo), complete heart block s/p PPM (in 2006, upgraded to BiV-ICD in 09/2016), CAD (s/p recent BERNABE mid LAD at [facility] on 04/18/2022), and stage II CKD (baseline Cr ~1.3) who was referred to Dr. Hess due to intermittent episodes of near syncope w/ palpitations and SOB since his recent cardiac  catheterization. At that time, patient's device was interrogated and showed back to back episodes of V-tach at 200+ bpm all terminating ATP and since his recent cath procedure patient has had 41 V-Tach episodes, all falling into V-fib zone. Patient was subsequently referred to [facility] for admission to cardiology/telemetry. Amiodarone load was initiated, patient intermittent was initially given IV diuretics for subjective shortness of breath, and subsequently reinitiated on his home medication of Lasix 40 mg PO daily. Patient's hospital course also initially complicated by gout flare, receieved Prednisone 40 mg PO x 1, and reinitiated on Allopurinol which patient takes at home; however, patient's home dose of Allopurinol 100 mg daily was decreased to Allopurinol 75 mg daily due to worsening renal function. Patient subsequently underwent cardiac catheterization on 05/23/2022 w/ Dr. Wagner which showed LM normal, proximal LAD mild disease w/ slow flow, mid LAD patent stent w/ slow flow, ostial D1 40-50% (small, unchanged), LCx/OM1 mild disease, and RCA/RPDA mild disease via right radial access. As recommended for Dr. Wagner post-procedure, patient was initiated on Ranexa 500 mg BID for slow flow. Patient was also reinitiated on outpatient prior outpatient Entresto dose as well as initiated on Farxiga (confirmed w/ pharmacy $0 copay but MUST be sent for 90 day supply). Patient subsequently was taken for EPS and possible ablation w/ EP on 05/24/2022, and transferred to CCU post-procedure for closer monitoring.     SUBJECTIVE:  Patient seen and examined at bedside.    Vital Signs Last 12 Hrs  T(F): 96.8 (05-24-22 @ 10:17), Max: 96.8 (05-24-22 @ 05:01)  HR: 61 (05-24-22 @ 09:31) (59 - 64)  BP: 104/69 (05-24-22 @ 09:31) (85/67 - 104/69)  BP(mean): --  RR: 18 (05-24-22 @ 08:33) (18 - 18)  SpO2: 93% (05-24-22 @ 08:33) (93% - 96%)  I&O's Summary    23 May 2022 07:01  -  24 May 2022 07:00  --------------------------------------------------------  IN: 240 mL / OUT: 775 mL / NET: -535 mL    24 May 2022 07:01  -  24 May 2022 14:52  --------------------------------------------------------  IN: 0 mL / OUT: 100 mL / NET: -100 mL        PHYSICAL EXAM:  Constitutional: NAD, comfortable in bed.  HEENT: NC/AT, PERRLA, EOMI, no conjunctival pallor or scleral icterus, MMM  Neck: Supple, no JVD  Respiratory: CTA B/L. No w/r/r.   Cardiovascular: RRR, normal S1 and S2, no m/r/g.   Gastrointestinal: +BS, soft NTND, no guarding or rebound tenderness, no palpable masses   Extremities: wwp; no cyanosis, clubbing or edema.   Vascular: Pulses equal and strong throughout.   Neurological: AAOx3, no CN deficits, strength and sensation intact throughout.   Skin: No gross skin abnormalities or rashes        LABS:                        17.0   9.99  )-----------( 237      ( 24 May 2022 06:33 )             51.0     05-24    138  |  102  |  48<H>  ----------------------------<  133<H>  4.6   |  24  |  1.79<H>    Ca    9.7      24 May 2022 06:33  Mg     2.2     05-24              RADIOLOGY & ADDITIONAL TESTS:    MEDICATIONS  (STANDING):  allopurinol 75 milliGRAM(s) Oral daily  aMIOdarone    Tablet 400 milliGRAM(s) Oral every 12 hours  aspirin enteric coated 81 milliGRAM(s) Oral daily  atorvastatin 80 milliGRAM(s) Oral at bedtime  carvedilol 12.5 milliGRAM(s) Oral every 12 hours  clopidogrel Tablet 75 milliGRAM(s) Oral daily  dapagliflozin 10 milliGRAM(s) Oral every 24 hours  dextrose 5%. 1000 milliLiter(s) (50 mL/Hr) IV Continuous <Continuous>  dextrose 5%. 1000 milliLiter(s) (100 mL/Hr) IV Continuous <Continuous>  dextrose 50% Injectable 25 Gram(s) IV Push once  dextrose 50% Injectable 12.5 Gram(s) IV Push once  dextrose 50% Injectable 25 Gram(s) IV Push once  glucagon  Injectable 1 milliGRAM(s) IntraMuscular once  heparin   Injectable 5000 Unit(s) SubCutaneous every 8 hours  insulin lispro (ADMELOG) corrective regimen sliding scale   SubCutaneous Before meals and at bedtime  pantoprazole    Tablet 40 milliGRAM(s) Oral before breakfast  ranolazine 500 milliGRAM(s) Oral two times a day  sacubitril 24 mG/valsartan 26 mG 1 Tablet(s) Oral two times a day  spironolactone 25 milliGRAM(s) Oral daily    MEDICATIONS  (PRN):  dextrose Oral Gel 15 Gram(s) Oral once PRN Blood Glucose LESS THAN 70 milliGRAM(s)/deciliter   ******************TRANSFER FROM Cardiac Telemetry TO CCU***************************    HOSPITAL COURSE: 65 y/o male w/ PMHx HTN, HLD, type 2 DM, chronic HFrEF (EF 25-30% by Echo), complete heart block s/p PPM (in 2006, upgraded to BiV-ICD in 09/2016), CAD (s/p recent BERNABE mid LAD at [facility] on 04/18/2022), and stage II CKD (baseline Cr ~1.3) who was referred to Dr. Hess due to intermittent episodes of near syncope w/ palpitations and SOB since his recent cardiac  catheterization. At that time, patient's device was interrogated and showed back to back episodes of V-tach at 200+ bpm all terminating ATP and since his recent cath procedure patient has had 41 V-Tach episodes, all falling into V-fib zone. Patient was subsequently referred to [facility] for admission to cardiology/telemetry. Amiodarone load was initiated, patient intermittent was initially given IV diuretics for subjective shortness of breath, and subsequently reinitiated on his home medication of Lasix 40 mg PO daily. Patient's hospital course also initially complicated by gout flare, received Prednisone 40 mg PO x 1, and reinitiated on Allopurinol which patient takes at home; however, patient's home dose of Allopurinol 100 mg daily was decreased to Allopurinol 75 mg daily due to worsening renal function. Patient subsequently underwent cardiac catheterization on 05/23/2022 w/ Dr. Wagner which showed LM normal, proximal LAD mild disease w/ slow flow, mid LAD patent stent w/ slow flow, ostial D1 40-50% (small, unchanged), LCx/OM1 mild disease, and RCA/RPDA mild disease via right radial access. As recommended for Dr. Wagner post-procedure, patient was initiated on Ranexa 500 mg BID for slow flow. Patient was also reinitiated on outpatient prior outpatient Entresto dose as well as initiated on Farxiga (confirmed w/ pharmacy $0 copay but MUST be sent for 90 day supply). Patient subsequently was taken for EPS and possible ablation w/ EP on 05/24/2022, and transferred to CCU post-procedure (no ablation done) for closer monitoring in setting of increased wedge pressure.     SUBJECTIVE:  Patient seen and examined at bedside. Feels drowsy but much improved. Reports trouble urinating otherwise without complaints.     Vital Signs Last 12 Hrs  T(F): 96.8 (05-24-22 @ 10:17), Max: 96.8 (05-24-22 @ 05:01)  HR: 61 (05-24-22 @ 09:31) (59 - 64)  BP: 104/69 (05-24-22 @ 09:31) (85/67 - 104/69)  BP(mean): --  RR: 18 (05-24-22 @ 08:33) (18 - 18)  SpO2: 93% (05-24-22 @ 08:33) (93% - 96%)  I&O's Summary    23 May 2022 07:01  -  24 May 2022 07:00  --------------------------------------------------------  IN: 240 mL / OUT: 775 mL / NET: -535 mL    24 May 2022 07:01  -  24 May 2022 14:52  --------------------------------------------------------  IN: 0 mL / OUT: 100 mL / NET: -100 mL    PHYSICAL EXAM:  Constitutional: NAD, comfortable in bed.  HEENT: NC/AT, PERRLA, EOMI, no conjunctival pallor or scleral icterus, dry MM   Neck: Supple, no JVD  Respiratory: CTA B/L. No w/r/r  Cardiovascular: RRR, no murmurs   Gastrointestinal: +BS, soft, no guarding or rebound tenderness, no palpable masses   Extremities: No cyanosis, clubbing or edema. Extremities slightly cool   : Distended bladder, groin soft non-tender   Vascular: Pulses equal and strong throughout.   Neurological: AAOx3, no CN deficits, strength and sensation intact throughout.   Skin: No gross skin abnormalities or rashes      LABS:                        17.0   9.99  )-----------( 237      ( 24 May 2022 06:33 )             51.0     05-24    138  |  102  |  48<H>  ----------------------------<  133<H>  4.6   |  24  |  1.79<H>    Ca    9.7      24 May 2022 06:33  Mg     2.2     05-24      RADIOLOGY & ADDITIONAL TESTS: REVIEWED     MEDICATIONS  (STANDING):  allopurinol 75 milliGRAM(s) Oral daily  aMIOdarone    Tablet 400 milliGRAM(s) Oral every 12 hours  aspirin enteric coated 81 milliGRAM(s) Oral daily  atorvastatin 80 milliGRAM(s) Oral at bedtime  carvedilol 12.5 milliGRAM(s) Oral every 12 hours  clopidogrel Tablet 75 milliGRAM(s) Oral daily  dapagliflozin 10 milliGRAM(s) Oral every 24 hours  dextrose 5%. 1000 milliLiter(s) (50 mL/Hr) IV Continuous <Continuous>  dextrose 5%. 1000 milliLiter(s) (100 mL/Hr) IV Continuous <Continuous>  dextrose 50% Injectable 25 Gram(s) IV Push once  dextrose 50% Injectable 12.5 Gram(s) IV Push once  dextrose 50% Injectable 25 Gram(s) IV Push once  glucagon  Injectable 1 milliGRAM(s) IntraMuscular once  heparin   Injectable 5000 Unit(s) SubCutaneous every 8 hours  insulin lispro (ADMELOG) corrective regimen sliding scale   SubCutaneous Before meals and at bedtime  pantoprazole    Tablet 40 milliGRAM(s) Oral before breakfast  ranolazine 500 milliGRAM(s) Oral two times a day  sacubitril 24 mG/valsartan 26 mG 1 Tablet(s) Oral two times a day  spironolactone 25 milliGRAM(s) Oral daily    MEDICATIONS  (PRN):  dextrose Oral Gel 15 Gram(s) Oral once PRN Blood Glucose LESS THAN 70 milliGRAM(s)/deciliter

## 2022-05-25 DIAGNOSIS — I47.2 VENTRICULAR TACHYCARDIA: ICD-10-CM

## 2022-05-25 DIAGNOSIS — I42.8 OTHER CARDIOMYOPATHIES: ICD-10-CM

## 2022-05-25 DIAGNOSIS — E11.9 TYPE 2 DIABETES MELLITUS WITHOUT COMPLICATIONS: ICD-10-CM

## 2022-05-25 DIAGNOSIS — I25.10 ATHEROSCLEROTIC HEART DISEASE OF NATIVE CORONARY ARTERY WITHOUT ANGINA PECTORIS: ICD-10-CM

## 2022-05-25 DIAGNOSIS — N18.9 CHRONIC KIDNEY DISEASE, UNSPECIFIED: ICD-10-CM

## 2022-05-25 LAB
ALBUMIN SERPL ELPH-MCNC: 3.6 G/DL — SIGNIFICANT CHANGE UP (ref 3.3–5)
ALP SERPL-CCNC: 59 U/L — SIGNIFICANT CHANGE UP (ref 40–120)
ALT FLD-CCNC: 12 U/L — SIGNIFICANT CHANGE UP (ref 10–45)
ANION GAP SERPL CALC-SCNC: 11 MMOL/L — SIGNIFICANT CHANGE UP (ref 5–17)
ANION GAP SERPL CALC-SCNC: 13 MMOL/L — SIGNIFICANT CHANGE UP (ref 5–17)
AST SERPL-CCNC: 21 U/L — SIGNIFICANT CHANGE UP (ref 10–40)
BASOPHILS # BLD AUTO: 0.06 K/UL — SIGNIFICANT CHANGE UP (ref 0–0.2)
BASOPHILS NFR BLD AUTO: 0.7 % — SIGNIFICANT CHANGE UP (ref 0–2)
BILIRUB SERPL-MCNC: 1.7 MG/DL — HIGH (ref 0.2–1.2)
BUN SERPL-MCNC: 47 MG/DL — HIGH (ref 7–23)
BUN SERPL-MCNC: 48 MG/DL — HIGH (ref 7–23)
CALCIUM SERPL-MCNC: 9 MG/DL — SIGNIFICANT CHANGE UP (ref 8.4–10.5)
CALCIUM SERPL-MCNC: 9.3 MG/DL — SIGNIFICANT CHANGE UP (ref 8.4–10.5)
CHLORIDE SERPL-SCNC: 102 MMOL/L — SIGNIFICANT CHANGE UP (ref 96–108)
CHLORIDE SERPL-SCNC: 98 MMOL/L — SIGNIFICANT CHANGE UP (ref 96–108)
CO2 SERPL-SCNC: 22 MMOL/L — SIGNIFICANT CHANGE UP (ref 22–31)
CO2 SERPL-SCNC: 23 MMOL/L — SIGNIFICANT CHANGE UP (ref 22–31)
CREAT SERPL-MCNC: 1.9 MG/DL — HIGH (ref 0.5–1.3)
CREAT SERPL-MCNC: 1.91 MG/DL — HIGH (ref 0.5–1.3)
EGFR: 39 ML/MIN/1.73M2 — LOW
EGFR: 39 ML/MIN/1.73M2 — LOW
EOSINOPHIL # BLD AUTO: 0.17 K/UL — SIGNIFICANT CHANGE UP (ref 0–0.5)
EOSINOPHIL NFR BLD AUTO: 2.1 % — SIGNIFICANT CHANGE UP (ref 0–6)
GLUCOSE BLDC GLUCOMTR-MCNC: 134 MG/DL — HIGH (ref 70–99)
GLUCOSE BLDC GLUCOMTR-MCNC: 142 MG/DL — HIGH (ref 70–99)
GLUCOSE BLDC GLUCOMTR-MCNC: 148 MG/DL — HIGH (ref 70–99)
GLUCOSE BLDC GLUCOMTR-MCNC: 176 MG/DL — HIGH (ref 70–99)
GLUCOSE SERPL-MCNC: 125 MG/DL — HIGH (ref 70–99)
GLUCOSE SERPL-MCNC: 159 MG/DL — HIGH (ref 70–99)
HCT VFR BLD CALC: 47.1 % — SIGNIFICANT CHANGE UP (ref 39–50)
HGB BLD-MCNC: 15.7 G/DL — SIGNIFICANT CHANGE UP (ref 13–17)
IMM GRANULOCYTES NFR BLD AUTO: 0.4 % — SIGNIFICANT CHANGE UP (ref 0–1.5)
LACTATE SERPL-SCNC: 1.1 MMOL/L — SIGNIFICANT CHANGE UP (ref 0.5–2)
LYMPHOCYTES # BLD AUTO: 2.34 K/UL — SIGNIFICANT CHANGE UP (ref 1–3.3)
LYMPHOCYTES # BLD AUTO: 28.7 % — SIGNIFICANT CHANGE UP (ref 13–44)
MAGNESIUM SERPL-MCNC: 2.1 MG/DL — SIGNIFICANT CHANGE UP (ref 1.6–2.6)
MAGNESIUM SERPL-MCNC: 2.1 MG/DL — SIGNIFICANT CHANGE UP (ref 1.6–2.6)
MCHC RBC-ENTMCNC: 30.8 PG — SIGNIFICANT CHANGE UP (ref 27–34)
MCHC RBC-ENTMCNC: 33.3 GM/DL — SIGNIFICANT CHANGE UP (ref 32–36)
MCV RBC AUTO: 92.4 FL — SIGNIFICANT CHANGE UP (ref 80–100)
MONOCYTES # BLD AUTO: 0.66 K/UL — SIGNIFICANT CHANGE UP (ref 0–0.9)
MONOCYTES NFR BLD AUTO: 8.1 % — SIGNIFICANT CHANGE UP (ref 2–14)
NEUTROPHILS # BLD AUTO: 4.88 K/UL — SIGNIFICANT CHANGE UP (ref 1.8–7.4)
NEUTROPHILS NFR BLD AUTO: 60 % — SIGNIFICANT CHANGE UP (ref 43–77)
NRBC # BLD: 0 /100 WBCS — SIGNIFICANT CHANGE UP (ref 0–0)
PHOSPHATE SERPL-MCNC: 3.7 MG/DL — SIGNIFICANT CHANGE UP (ref 2.5–4.5)
PHOSPHATE SERPL-MCNC: 4.7 MG/DL — HIGH (ref 2.5–4.5)
PLATELET # BLD AUTO: 189 K/UL — SIGNIFICANT CHANGE UP (ref 150–400)
POTASSIUM SERPL-MCNC: 4.3 MMOL/L — SIGNIFICANT CHANGE UP (ref 3.5–5.3)
POTASSIUM SERPL-MCNC: 4.9 MMOL/L — SIGNIFICANT CHANGE UP (ref 3.5–5.3)
POTASSIUM SERPL-SCNC: 4.3 MMOL/L — SIGNIFICANT CHANGE UP (ref 3.5–5.3)
POTASSIUM SERPL-SCNC: 4.9 MMOL/L — SIGNIFICANT CHANGE UP (ref 3.5–5.3)
PROT SERPL-MCNC: 6.6 G/DL — SIGNIFICANT CHANGE UP (ref 6–8.3)
RBC # BLD: 5.1 M/UL — SIGNIFICANT CHANGE UP (ref 4.2–5.8)
RBC # FLD: 15.8 % — HIGH (ref 10.3–14.5)
SODIUM SERPL-SCNC: 133 MMOL/L — LOW (ref 135–145)
SODIUM SERPL-SCNC: 136 MMOL/L — SIGNIFICANT CHANGE UP (ref 135–145)
WBC # BLD: 8.14 K/UL — SIGNIFICANT CHANGE UP (ref 3.8–10.5)
WBC # FLD AUTO: 8.14 K/UL — SIGNIFICANT CHANGE UP (ref 3.8–10.5)

## 2022-05-25 PROCEDURE — 99291 CRITICAL CARE FIRST HOUR: CPT

## 2022-05-25 PROCEDURE — 71045 X-RAY EXAM CHEST 1 VIEW: CPT | Mod: 26

## 2022-05-25 PROCEDURE — 93308 TTE F-UP OR LMTD: CPT | Mod: 26

## 2022-05-25 PROCEDURE — 99291 CRITICAL CARE FIRST HOUR: CPT | Mod: 25

## 2022-05-25 RX ORDER — METOPROLOL TARTRATE 50 MG
25 TABLET ORAL EVERY 12 HOURS
Refills: 0 | Status: DISCONTINUED | OUTPATIENT
Start: 2022-05-25 | End: 2022-05-26

## 2022-05-25 RX ADMIN — AMIODARONE HYDROCHLORIDE 400 MILLIGRAM(S): 400 TABLET ORAL at 17:40

## 2022-05-25 RX ADMIN — CLOPIDOGREL BISULFATE 75 MILLIGRAM(S): 75 TABLET, FILM COATED ORAL at 11:15

## 2022-05-25 RX ADMIN — HEPARIN SODIUM 5000 UNIT(S): 5000 INJECTION INTRAVENOUS; SUBCUTANEOUS at 13:32

## 2022-05-25 RX ADMIN — Medication 25 MILLIGRAM(S): at 17:40

## 2022-05-25 RX ADMIN — ATORVASTATIN CALCIUM 80 MILLIGRAM(S): 80 TABLET, FILM COATED ORAL at 21:34

## 2022-05-25 RX ADMIN — RANOLAZINE 500 MILLIGRAM(S): 500 TABLET, FILM COATED, EXTENDED RELEASE ORAL at 06:25

## 2022-05-25 RX ADMIN — AMIODARONE HYDROCHLORIDE 400 MILLIGRAM(S): 400 TABLET ORAL at 06:25

## 2022-05-25 RX ADMIN — CARVEDILOL PHOSPHATE 12.5 MILLIGRAM(S): 80 CAPSULE, EXTENDED RELEASE ORAL at 06:25

## 2022-05-25 RX ADMIN — PANTOPRAZOLE SODIUM 40 MILLIGRAM(S): 20 TABLET, DELAYED RELEASE ORAL at 06:26

## 2022-05-25 RX ADMIN — Medication 2: at 06:59

## 2022-05-25 RX ADMIN — Medication 40 MILLIGRAM(S): at 06:25

## 2022-05-25 RX ADMIN — SPIRONOLACTONE 25 MILLIGRAM(S): 25 TABLET, FILM COATED ORAL at 06:25

## 2022-05-25 RX ADMIN — Medication 81 MILLIGRAM(S): at 11:15

## 2022-05-25 RX ADMIN — HEPARIN SODIUM 5000 UNIT(S): 5000 INJECTION INTRAVENOUS; SUBCUTANEOUS at 21:34

## 2022-05-25 RX ADMIN — HEPARIN SODIUM 5000 UNIT(S): 5000 INJECTION INTRAVENOUS; SUBCUTANEOUS at 06:24

## 2022-05-25 RX ADMIN — Medication 75 MILLIGRAM(S): at 11:15

## 2022-05-25 NOTE — PHYSICAL THERAPY INITIAL EVALUATION ADULT - PERTINENT HX OF CURRENT PROBLEM, REHAB EVAL
Patient is a 66 year old male referred from Dr Hess’s office endorsing intermittent episodes of near syncope with palpitations and SOB since his last cath. Interrogation showing back-to-back episodes of VT @ 200+ bpm all terminating with ATP. Since last cath pt has had 41 VT episodes.  Pt admitted to cardiac tele with plan for cardiac cath, initiation of amiodarone and possible EPS/VT ablation next week.

## 2022-05-25 NOTE — CONSULT NOTE ADULT - PROBLEM SELECTOR RECOMMENDATION 3
S/p LAD stent, patent on most recent cath and nonobstructive disease in other vessels.   - ASA/plavix  - High intensity statin, Lipitor 80

## 2022-05-25 NOTE — CONSULT NOTE ADULT - CRITICAL CARE ATTENDING COMMENT
This is a very pleasant, 65 yo M with DM II (A1C 6.2%), hypertension, CAD s/p prior MI (2008, 2011) with PCI mLAD in April 2022, mixed cardiomyopathy with LVEF currently 10-15% who presented to Dr. Hess with pre-syncope and found to have 40 episodes of VT with successful ATP and no shocks. He was admitted for an ischemic work-up and plan for VT ablation. LHC revealed a patent stent with slow flow. No scar was found at the time of mapping so he was started on an amiodarone load. Hemodynamics obtained by EP using groin sheath, but could not float into PA: CVP 17, LAP 33 and LVEDP 24. He received lasix 40 mg IV x2 doses in CICU and is now euvolemic. He is blood type A+.    TTE with LVIDd 5.2 cm and decreased RVF, EF 10-15%, mild AR/AS/MR. Labs notable for JAGRUTI (SCr 1.9 with baseline ~1.2-1.4), TBili 1.7 and Hgb 15.7. He diuresed about 2L and JVP is 6 cm H2O today on exam. He has been weaned off epinephrine and levophed, but has been too hypotensive to resume his outpatient Entresto 24-26. He was given Coreg 12.5 mg this morning along with spironolactone 25 mg once daily.     ECG is Biventricular paced.    Given the presentation was VT with severe LV and at least mild-moderate RV dysfunction, along with hypotension, I am concerned that he will not tolerate any meaningful escalation of GDMT and he should have an advanced HF therapies evaluation. The JAGRUTI may suggest low flow so we will take him to the cath lab tomorrow morning (he just ate) for placement of RIJ PAC. Please maintain the arterial line.     Please switch the Coreg to Toprol XL 25 mg BID starting tonight. We may be able to try to resume the Entresto tomorrow if we have more room on the BP. Continue spironolactone 25 mg daily without a loop diuretic. Monitor K with daily labs.    I have discussed this plan with the patient and his wife (by phone) and with the CCU team at [facility]. Once cleared by the finance coordinator for advanced therapies evaluation, we will initiate the transfer to the [facility] CICU.

## 2022-05-25 NOTE — PROGRESS NOTE ADULT - ASSESSMENT
65 y/o male w/ PMHx HTN, HLD, type 2 DM, chronic HFrEF (EF 25-30% by Echo), complete heart block s/p PPM (in 2006, upgraded to BiV-ICD in 09/2016), CAD (s/p recent BERNABE mid LAD at [facility] on 04/18/2022), and stage II CKD (baseline Cr ~1.3) admitted for intermittent vtach, now s/p EPS but unable to perform ablation because of elevated LA pressures. Transferred to CCU for diuresis    NEURO  AAOx3    CARDIO  #VTach s/p EPS and ablation 5/24  Intermittent couplets/PVCs, NSVT x4, intermittently A-V paced on tele, EP following  - Interrogation of ICD @Dr Wilson's office 5/20: back-to-back episodes of VT @ 200+ bpm all terminating with ATP. Since last cath pt has had 41 VT episodes (all falling into VF zone)  - Normal device function. BIV pacing 97%. No programming changes made  Plan:  - c/w home Coreg 12.5 mg BID. c/w Amiodarone 400 mg PO BID per EP recommendations.  -  on Ranexa 500 mg BID for slow flow per Dr Booth    #CAD (coronary artery disease).    Chest pain free and complaint with DAPT since last cath 4/18/22  - Trop flat 0.04 x2 CK, D-dimer normal -184  - Cardiac cath @[facility] 4/18/22: mLAD 90% s/p BERNABE, LCx mild disease, RCA mild disease.  - ECHO (03/2022, per MD note): mild LV dilation, EF 25-30%, mild RV dilation, trace AI/MR.   - ECHO 5/21/22: LVEF 10-15%, abnormal septal motion seen due to RV pacing, remaining segments demonstrate severe hypokinesis, D- shaped flattening of the interventricular septum due to RV pressure overload, normal RV size, reduced RVSF, dilated RA, mild AI/AS, pulm HTN (PASP 51 mmHg), mildly dilated ascending aorta, based on TTE 2016 the LVEF is lower is RV function is now reduced.   - s/p diagnostic cardiac cath w/ Dr Wagner on 05/23/2022  Plan:  - c/w Ecotrin 81 mg PO QD  - c/w Plavix 75 mg PO QD  - c/w Atorvastatin increased to 80 mg QHS  - initiated on Ranexa 500 mg BID for slow flow found in LAD     #Acute on chronic HFrEF (heart failure with reduced ejection fraction).   Bibasilar rales L>R, +JVD, saturating well on RA  - ECHO 5/21/22 with LVEF 10-15% (reduced from 25-30% 3/2022)  Plan:   - Received lasix 40 IVP x2 since 5/24 arrival on CCU, last received this am 5/25 and dc'ed, will consider restarting Home Lasix 40 mg PO daily  - Started spironolactone 25mg daily  - Reinitiated Entresto 24-26 mg PO BID, will monitor side effects as pt was light headed on Entresto in the past   - initiated on Farxiga 10 mg daily (confirmed w/ pharmacy $0 copay but MUST be sent 90 day supply)  - c/w Coreg 12.5 mg PO BID  - Net negative 2.5 L since admission   - Core measures, 1.5L fluid restriction, strict I&Os, daily weights.    #Hyperlipidemia.   - , , HDL 29, LDL 84  - Home Atorvastatin increased to 80 mg PO qHS    PULM  No Acute issues.    GI  No acute issues.    /RENAL  #JAGRUTI (acute kidney injury) CKD II  baseline Cr ~1.3  - Cr today 1.79, possible cardiorenal  - Avoid nephrotoxic agents, NSAIDs. Renally dose meds.    MSK  #Acute gout   TTP to R great toe on admit, s/p prednisone 40 mg PO x1 with resolution  Serum uric acid 14.1 5/22  Plan:   - restarted on allopurinol 75mg daily (due to CrCL-home dose 100mg).    ENDO  #T2DM  - On farxiga currently (for HF)  - no ISS started yet    HEME  No acute issues    DISPO  DVT F: Oral intake  E: Replete electrolytes as needed for K<4 and Mg<2  N: DASH Diet  DVT PPX: SQ heparin  Dispo: transfer to CCU   65 y/o male w/ PMHx HTN, HLD, type 2 DM, chronic HFrEF (EF 25-30% by Echo), complete heart block s/p PPM (in 2006, upgraded to BiV-ICD in 09/2016), CAD (s/p recent BERNABE mid LAD at [facility] on 04/18/2022), and stage II CKD (baseline Cr ~1.3) admitted for intermittent vtach, now s/p EPS but unable to perform ablation because of elevated LA pressures. Transferred to CCU for diuresis    NEURO  AAOx3    CARDIO  #VTach s/p EPS on 5/24, unable to perform ablation as no substrate found and did not induce VT because of severely low EF.   Intermittent couplets/PVCs, NSVT x4, intermittently A-V paced on tele, EP following  - Interrogation of ICD @Dr Wilson's office 5/20: back-to-back episodes of VT @ 200+ bpm all terminating with ATP. Since last cath pt has had 41 VT episodes (all falling into VF zone)  - Normal device function. BIV pacing 97%. No programming changes made  Plan:  - c/w home Coreg 12.5 mg BID. c/w Amiodarone 400 mg PO BID per EP recommendations.  -  on Ranexa 500 mg BID for slow flow per Dr Booth    #CAD (coronary artery disease).    Chest pain free and compliant with DAPT since last cath 4/18/22  - Trop flat 0.04 x2 CK, D-dimer normal -184  - Cardiac cath @[facility] 4/18/22: mLAD 90% s/p BERNABE, LCx mild disease, RCA mild disease.  - ECHO (03/2022, per MD note): mild LV dilation, EF 25-30%, mild RV dilation, trace AI/MR.   - ECHO 5/21/22: LVEF 10-15%, abnormal septal motion seen due to RV pacing, remaining segments demonstrate severe hypokinesis, D- shaped flattening of the interventricular septum due to RV pressure overload, normal RV size, reduced RVSF, dilated RA, mild AI/AS, pulm HTN (PASP 51 mmHg), mildly dilated ascending aorta, based on TTE 2016 the LVEF is lower is RV function is now reduced.   - s/p diagnostic cardiac cath w/ Dr Wagner on 05/23/2022   Plan:  - c/w Ecotrin 81 mg PO QD  - c/w Plavix 75 mg PO QD  - c/w Atorvastatin increased to 80 mg QHS  - initiated on Ranexa 500 mg BID for slow flow found in LAD     #Acute on chronic HFrEF (heart failure with reduced ejection fraction).   - ECHO 5/21/22 with LVEF 10-15% (reduced from 25-30% 3/2022)  Plan:   - Received lasix 40 IVP x2 since 5/24 arrival on CCU, last received this am 5/25 and dc'ed, will consider restarting Home Lasix 40 mg PO daily  - Started spironolactone 25mg daily  - Reinitiated Entresto 24-26 mg PO BID, will monitor side effects as pt was light headed on Entresto in the past   - c/w Coreg 12.5 mg PO BID  - Net negative 2.5 L since admission   - Core measures, 1.5L fluid restriction, strict I&Os, daily weights.    #Hyperlipidemia.   - , , HDL 29, LDL 84  - Home Atorvastatin increased to 80 mg PO qHS    PULM  No Acute issues.    GI  No acute issues.    /RENAL  #JAGRUTI (acute kidney injury) CKD II  baseline Cr ~1.3  - monitor CR   - Avoid nephrotoxic agents, NSAIDs. Renally dose meds.    MSK  #Acute gout   TTP to R great toe on admit, s/p prednisone 40 mg PO x1 with resolution  Serum uric acid 14.1 5/22  Plan:   - restarted on allopurinol 75mg daily (due to CrCL-home dose 100mg).    ENDO  #T2DM  - no ISS started yet    HEME  No acute issues    DISPO  DVT F: Oral intake  E: Replete electrolytes as needed for K<4 and Mg<2  N: DASH Diet  DVT PPX: SQ heparin  Dispo: transfer to CCU   65 y/o male w/ PMHx HTN, HLD, type 2 DM, chronic HFrEF (EF 25-30% by Echo), complete heart block s/p PPM (in 2006, upgraded to BiV-ICD in 09/2016), CAD (s/p recent BERNABE mid LAD at [facility] on 04/18/2022), and stage II CKD (baseline Cr ~1.3) admitted for intermittent vtach, now s/p EPS but unable to perform ablation because of elevated LA pressures. Transferred to CCU for diuresis    NEURO  AAOx3    CARDIO  #VTach s/p EPS on 5/24, unable to perform ablation as no substrate found and did not induce VT because of severely low EF.   Intermittent couplets/PVCs, NSVT x4, intermittently A-V paced on tele, EP following  - Interrogation of ICD @Dr Wilson's office 5/20: back-to-back episodes of VT @ 200+ bpm all terminating with ATP. Since last cath pt has had 41 VT episodes (all falling into VF zone)  - Normal device function. BIV pacing 97%. No programming changes made  Plan:  - c/w home Coreg 12.5 mg BID. c/w Amiodarone 400 mg PO BID per EP recommendations.  -  on Ranexa 500 mg BID for slow flow per Dr Booth    #CAD (coronary artery disease).    Chest pain free and compliant with DAPT since last cath 4/18/22  - Trop flat 0.04 x2 CK, D-dimer normal -184  - [facility] 4/18[facility]: mLAD 90% s/p BERNABE, LCx mild disease, RCA mild disease.  - ECHO (03/2022, per MD note): mild LV dilation, EF 25-30%, mild RV dilation, trace AI/MR.   - ECHO 5/21/22: LVEF 10-15%, abnormal septal motion seen due to RV pacing, remaining segments demonstrate severe hypokinesis, D- shaped flattening of the interventricular septum due to RV pressure overload, normal RV size, reduced RVSF, dilated RA, mild AI/AS, pulm HTN (PASP 51 mmHg), mildly dilated ascending aorta, based on TTE 2016 the LVEF is lower is RV function is now reduced.   - s/p diagnostic cardiac cath w/ Dr Wagner on 05/23/2022   Plan:  - c/w ASA 81 mg PO QD  - c/w Plavix 75 mg PO QD  - c/w Atorvastatin increased to 80 mg QHS  - initiated on Ranexa 500 mg BID for slow flow found in LAD     #Acute on chronic HFrEF (heart failure with reduced ejection fraction).   - ECHO 5/21/22 with LVEF 10-15% (reduced from 25-30% 3/2022)  Plan:   - Received lasix 40 IVP x2 since 5/24 arrival on CCU, last received this am 5/25   - c/w  spironolactone 25mg daily  - c/w Entresto 24-26 mg PO BID, will monitor side effects as pt was light headed on Entresto in the past   - c/w Coreg 12.5 mg PO BID  - Net negative 2.5 L since admission   - Core measures, 1.5L fluid restriction, strict I&Os, daily weights.  - will start farxiga on discharge     #Hyperlipidemia.   - , , HDL 29, LDL 84  - Home Atorvastatin increased to 80 mg PO qHS    PULM  No Acute issues.    GI  No acute issues.    /RENAL  #JAGRUTI (acute kidney injury) CKD II  baseline Cr ~1.3  - monitor CR   - Avoid nephrotoxic agents, NSAIDs. Renally dose meds.    MSK  #Acute gout   TTP to R great toe on admit, s/p prednisone 40 mg PO x1 with resolution  Serum uric acid 14.1 5/22  Plan:   - restarted on allopurinol 75mg daily (due to CrCL-home dose 100mg).    ENDO  #T2DM  - no ISS started yet    HEME  No acute issues    DISPO  DVT F: Oral intake  E: Replete electrolytes as needed for K<4 and Mg<2  N: DASH Diet  DVT PPX: SQ heparin  Dispo: transfer to CCU   67 y/o male w/ PMHx HTN, HLD, type 2 DM, chronic HFrEF (EF 25-30% by Echo), complete heart block s/p PPM (in 2006, upgraded to BiV-ICD in 09/2016), CAD (s/p recent BERNABE mid LAD at [facility] on 04/18/2022), and stage II CKD (baseline Cr ~1.3) admitted for intermittent vtach, now s/p EPS but unable to perform ablation because of elevated LA pressures. Transferred to CCU for diuresis    NEURO  AAOx3    CARDIO  #VTach s/p EPS on 5/24, unable to perform ablation as no substrate found and did not induce VT because of severely low EF.   Intermittent couplets/PVCs, NSVT x4, intermittently A-V paced on tele, EP following  - Interrogation of ICD @Dr Wilson's office 5/20: back-to-back episodes of VT @ 200+ bpm all terminating with ATP. Since last cath pt has had 41 VT episodes (all falling into VF zone)  - Normal device function. BIV pacing 97%. No programming changes made  Plan:  - c/w home Coreg 12.5 mg BID. c/w Amiodarone 400 mg PO BID per EP recommendations.  -  on Ranexa 500 mg BID for slow flow per Dr Booth    #CAD (coronary artery disease).    Chest pain free and compliant with DAPT since last cath 4/18/22  - Trop flat 0.04 x2 CK, D-dimer normal -184  - Cardiac cath @[facility] 4/18/22: mLAD 90% s/p BERNABE, LCx mild disease, RCA mild disease.  - ECHO (03/2022, per MD note): mild LV dilation, EF 25-30%, mild RV dilation, trace AI/MR.   - ECHO 5/21/22: LVEF 10-15%, abnormal septal motion seen due to RV pacing, remaining segments demonstrate severe hypokinesis, D- shaped flattening of the interventricular septum due to RV pressure overload, normal RV size, reduced RVSF, dilated RA, mild AI/AS, pulm HTN (PASP 51 mmHg), mildly dilated ascending aorta, based on TTE 2016 the LVEF is lower is RV function is now reduced.   - s/p diagnostic cardiac cath w/ Dr Wagner on 05/23/2022   Plan:  - c/w ASA 81 mg PO QD  - c/w Plavix 75 mg PO QD  - c/w Atorvastatin increased to 80 mg QHS  - initiated on Ranexa 500 mg BID for slow flow found in LAD     #Acute on chronic HFrEF (heart failure with reduced ejection fraction).   - ECHO 5/21/22 with LVEF 10-15% (reduced from 25-30% 3/2022)  Plan:   - Received lasix 40 IVP x2 since 5/24 arrival on CCU with 1.8L urine output   - c/w  spironolactone 25mg daily  - c/w Entresto 24-26 mg PO BID, will monitor side effects as pt was light headed on Entresto in the past   - c/w Coreg 12.5 mg PO BID  - Net negative 2.5 L since admission   - Core measures, 1.5L fluid restriction, strict I&Os, daily weights.  - will start farxiga on discharge     #Hyperlipidemia.   - , , HDL 29, LDL 84  - Home Atorvastatin increased to 80 mg PO qHS    PULM  No Acute issues.    GI  No acute issues.    /RENAL  #JAGRUTI (acute kidney injury) CKD II  baseline Cr ~1.3  - monitor CR   - Avoid nephrotoxic agents, NSAIDs. Renally dose meds.    MSK  #Acute gout   TTP to R great toe on admit, s/p prednisone 40 mg PO x1 with resolution  Serum uric acid 14.1 5/22  Plan:   - restarted on allopurinol 75mg daily (due to CrCL-home dose 100mg).    ENDO  #T2DM  - no ISS started yet    HEME  No acute issues    DISPO  DVT F: Oral intake  E: Replete electrolytes as needed for K<4 and Mg<2  N: DASH Diet  DVT PPX: SQ heparin  Dispo: transfer to CCU   67 y/o male w/ PMHx HTN, HLD, type 2 DM, chronic HFrEF (EF 25-30% by Echo), complete heart block s/p PPM (in 2006, upgraded to BiV-ICD in 09/2016), CAD (s/p recent BERNABE mid LAD at [facility] on 04/18/2022), and stage II CKD (baseline Cr ~1.3) admitted for intermittent vtach, now s/p EPS but unable to perform ablation because of elevated LA pressures. Transferred to CCU for diuresis    NEURO  AAOx3    CARDIO  #VTach s/p EPS on 5/24, unable to perform ablation as no substrate found and did not induce VT because of severely low EF.   Intermittent couplets/PVCs, NSVT x4, intermittently A-V paced on tele, EP following  - Interrogation of ICD @Dr Wilson's office 5/20: back-to-back episodes of VT @ 200+ bpm all terminating with ATP. Since last cath pt has had 41 VT episodes (all falling into VF zone)  - Normal device function. BIV pacing 97%. No programming changes made  Plan:  - c/w home Coreg 12.5 mg BID. c/w Amiodarone 400 mg PO BID per EP recommendations.  -  on Ranexa 500 mg BID for slow flow per Dr Booth    #CAD (coronary artery disease).    Chest pain free and compliant with DAPT since last cath 4/18/22  - Trop flat 0.04 x2 CK, D-dimer normal -184  - Cardiac cath @[facility] 4/18/22: mLAD 90% s/p BERNABE, LCx mild disease, RCA mild disease.  - ECHO (03/2022, per MD note): mild LV dilation, EF 25-30%, mild RV dilation, trace AI/MR.   - ECHO 5/21/22: LVEF 10-15%, abnormal septal motion seen due to RV pacing, remaining segments demonstrate severe hypokinesis, D- shaped flattening of the interventricular septum due to RV pressure overload, normal RV size, reduced RVSF, dilated RA, mild AI/AS, pulm HTN (PASP 51 mmHg), mildly dilated ascending aorta, based on TTE 2016 the LVEF is lower is RV function is now reduced.   - s/p diagnostic cardiac cath w/ Dr Wagner on 05/23/2022   Plan:  - c/w ASA 81 mg PO QD  - c/w Plavix 75 mg PO QD  - c/w Atorvastatin increased to 80 mg QHS  - initiated on Ranexa 500 mg BID for slow flow found in LAD     #Acute on chronic HFrEF (heart failure with reduced ejection fraction).   - ECHO 5/21/22 with LVEF 10-15% (reduced from 25-30% 3/2022)  Plan:   - Received lasix 40 IVP x2 since 5/24 arrival on CCU with 1.8L urine output   - c/w  spironolactone 25mg daily  - c/w Entresto 24-26 mg PO BID, will monitor side effects as pt was light headed on Entresto in the past   - c/w Coreg 12.5 mg PO BID  - Net negative 2.5 L since admission   - Core measures, 1.5L fluid restriction, strict I&Os, daily weights.  - will start farxiga on discharge     #Hyperlipidemia.   - , , HDL 29, LDL 84  - Home Atorvastatin increased to 80 mg PO qHS    PULM  No Acute issues.    GI  No acute issues.    /RENAL  #JAGRUTI (acute kidney injury) CKD II  baseline Cr ~1.3  - monitor CR   - Avoid nephrotoxic agents, NSAIDs. Renally dose meds.    MSK  #Acute gout   TTP to R great toe on admit, s/p prednisone 40 mg PO x1 with resolution  Serum uric acid 14.1 5/22  Plan:   - restarted on allopurinol 75mg daily (due to CrCL-home dose 100mg).    ENDO  #T2DM  - on ISS     HEME  No acute issues    DISPO  DVT F: Oral intake  E: Replete electrolytes as needed for K<4 and Mg<2  N: DASH Diet  DVT PPX: SQ heparin  Dispo: transfer to CCU

## 2022-05-25 NOTE — PHYSICAL THERAPY INITIAL EVALUATION ADULT - ADDITIONAL COMMENTS
Patient reports he lives in private home with 3 KATHERIN and stays on first floor. PTA patient was independent with all mobility and ADLs. Patient owns rolling walker.

## 2022-05-25 NOTE — CONSULT NOTE ADULT - SUBJECTIVE AND OBJECTIVE BOX
Cardiology Consult Note    HPI: 66M Mixed Ischemic/NICM Cardiomyopathy (EF 25-30% at bseline, s/p BIV-ICD), CAD (medically managed MIs in 2008,2011, Most recent stent in April 2022 to mLAD) presented with multiple near syncope episodes to Dr. Cortez office, device showing VT episodes x41 up to 200 bpm that were ATP’ed out and sent to [facility] for evaluation. Underwent repeat cath, patent stent, slow flow with otherwise nonobstructive disease. Taken for ablation yesterday. EF noted significantly low, mapped with no obvious scar/substrate for VT ablation, requiring Epi/levo and brought to CCU.        PAST MEDICAL & SURGICAL HISTORY:  AV block    Essential hypertension    Pure hypercholesterolemia    Myocardial infarct, old    Chronic HFrEF (heart failure with reduced ejection fraction)    Chronic kidney disease, unspecified CKD stage    CAD (coronary artery disease)    HLD (hyperlipidemia)    Type 2 diabetes mellitus    Artificial cardiac pacemaker        MEDICATIONS  (STANDING):  allopurinol 75 milliGRAM(s) Oral daily  aMIOdarone    Tablet 400 milliGRAM(s) Oral every 12 hours  aspirin enteric coated 81 milliGRAM(s) Oral daily  atorvastatin 80 milliGRAM(s) Oral at bedtime  carvedilol 12.5 milliGRAM(s) Oral every 12 hours  clopidogrel Tablet 75 milliGRAM(s) Oral daily  dextrose 5%. 1000 milliLiter(s) (50 mL/Hr) IV Continuous <Continuous>  dextrose 5%. 1000 milliLiter(s) (100 mL/Hr) IV Continuous <Continuous>  dextrose 50% Injectable 12.5 Gram(s) IV Push once  dextrose 50% Injectable 25 Gram(s) IV Push once  dextrose 50% Injectable 25 Gram(s) IV Push once  glucagon  Injectable 1 milliGRAM(s) IntraMuscular once  heparin   Injectable 5000 Unit(s) SubCutaneous every 8 hours  insulin lispro (ADMELOG) corrective regimen sliding scale   SubCutaneous Before meals and at bedtime  pantoprazole    Tablet 40 milliGRAM(s) Oral before breakfast  ranolazine 500 milliGRAM(s) Oral two times a day  sacubitril 24 mG/valsartan 26 mG 1 Tablet(s) Oral two times a day  spironolactone 25 milliGRAM(s) Oral daily    MEDICATIONS  (PRN):  dextrose Oral Gel 15 Gram(s) Oral once PRN Blood Glucose LESS THAN 70 milliGRAM(s)/deciliter    Vital Signs Last 24 Hrs  T(C): 36.4 (25 May 2022 06:05), Max: 36.8 (25 May 2022 02:15)  T(F): 97.6 (25 May 2022 06:05), Max: 98.3 (25 May 2022 02:15)  HR: 69 (25 May 2022 08:00) (61 - 69)  BP: 116/79 (24 May 2022 17:00) (104/69 - 116/79)  BP(mean): 94 (24 May 2022 17:00) (94 - 94)  RR: 22 (25 May 2022 08:00) (15 - 22)  SpO2: 97% (25 May 2022 08:00) (93% - 100%)    PHYSICAL EXAM:  GEN: Awake, alert. NAD.   HEENT: NCAT, PERRL, EOMI. Mucosa moist. No JVD.  RESP: CTA b/l  CV: RRR. Normal S1/S2. No m/r/g.  ABD: Soft. NT/ND. BS+  EXT: Warm. No edema, clubbing, or cyanosis.   NEURO: AAOx3. No focal deficits.     LABS:                        15.7   8.14  )-----------( 189      ( 25 May 2022 04:34 )             47.1     05-25    136  |  102  |  48<H>  ----------------------------<  125<H>  4.3   |  23  |  1.91<H>    Ca    9.0      25 May 2022 04:34  Phos  4.7     05-25  Mg     2.1     05-25    TPro  6.6  /  Alb  3.6  /  TBili  1.7<H>  /  DBili  x   /  AST  21  /  ALT  12  /  AlkPhos  59  05-25        PT/INR - ( 24 May 2022 17:22 )   PT: 13.4 sec;   INR: 1.12          PTT - ( 24 May 2022 17:22 )  PTT:42.7 sec    I&O's Summary    24 May 2022 07:01  -  25 May 2022 07:00  --------------------------------------------------------  IN: 0 mL / OUT: 1800 mL / NET: -1800 mL      RADIOLOGY & ADDITIONAL STUDIES:    EKG:         Heart Failure Consult Note    HPI: 66M Mixed Ischemic/NICM Cardiomyopathy (EF 25-30% at bseline, s/p BIV-ICD), CAD (medically managed MIs in 2008,2011, Most recent stent in April 2022 to mLAD) presented with multiple near syncope episodes to Dr. Cortez office, device showing VT episodes x41 up to 200 bpm that were ATP’ed out and sent to [facility] for evaluation. Underwent repeat cath, patent stent, slow flow with otherwise nonobstructive disease. Taken for ablation yesterday. EF noted significantly low, mapped with no obvious scar/substrate for VT ablation, requiring Epi/levo and brought to CCU.  Patient weaned off pressors. Patient states feeling well, no dizziness, mild fatigue. Denies chest pain or SOB.  States he walks 4 blocks without dyspnea, used to walk more prior to the pandemic but hasnt had to walk as far since so hasnt tried. No issues with stairs.      FHx: Unknown cardiomyopathy, CAD+  Social: Former tobacco use      PAST MEDICAL & SURGICAL HISTORY:  AV block    Essential hypertension    Pure hypercholesterolemia    Myocardial infarct, old    Chronic HFrEF (heart failure with reduced ejection fraction)    Chronic kidney disease, unspecified CKD stage    CAD (coronary artery disease)    HLD (hyperlipidemia)    Type 2 diabetes mellitus    Artificial cardiac pacemaker        MEDICATIONS  (STANDING):  allopurinol 75 milliGRAM(s) Oral daily  aMIOdarone    Tablet 400 milliGRAM(s) Oral every 12 hours  aspirin enteric coated 81 milliGRAM(s) Oral daily  atorvastatin 80 milliGRAM(s) Oral at bedtime  carvedilol 12.5 milliGRAM(s) Oral every 12 hours  clopidogrel Tablet 75 milliGRAM(s) Oral daily  dextrose 5%. 1000 milliLiter(s) (50 mL/Hr) IV Continuous <Continuous>  dextrose 5%. 1000 milliLiter(s) (100 mL/Hr) IV Continuous <Continuous>  dextrose 50% Injectable 12.5 Gram(s) IV Push once  dextrose 50% Injectable 25 Gram(s) IV Push once  dextrose 50% Injectable 25 Gram(s) IV Push once  glucagon  Injectable 1 milliGRAM(s) IntraMuscular once  heparin   Injectable 5000 Unit(s) SubCutaneous every 8 hours  insulin lispro (ADMELOG) corrective regimen sliding scale   SubCutaneous Before meals and at bedtime  pantoprazole    Tablet 40 milliGRAM(s) Oral before breakfast  ranolazine 500 milliGRAM(s) Oral two times a day  sacubitril 24 mG/valsartan 26 mG 1 Tablet(s) Oral two times a day  spironolactone 25 milliGRAM(s) Oral daily    MEDICATIONS  (PRN):  dextrose Oral Gel 15 Gram(s) Oral once PRN Blood Glucose LESS THAN 70 milliGRAM(s)/deciliter    Vital Signs Last 24 Hrs  T(C): 36.4 (25 May 2022 06:05), Max: 36.8 (25 May 2022 02:15)  T(F): 97.6 (25 May 2022 06:05), Max: 98.3 (25 May 2022 02:15)  HR: 69 (25 May 2022 08:00) (61 - 69)  BP: 116/79 (24 May 2022 17:00) (104/69 - 116/79)  BP(mean): 94 (24 May 2022 17:00) (94 - 94)  RR: 22 (25 May 2022 08:00) (15 - 22)  SpO2: 97% (25 May 2022 08:00) (93% - 100%)    PHYSICAL EXAM:  GEN: Awake, alert. NAD.   HEENT: JVP 8-10  RESP: CTA b/l  CV: RRR, S1, s2.  ABD: Soft. NT/ND  EXT: Warm. No edema  NEURO: AAOx3. No focal deficits.     LABS:                        15.7   8.14  )-----------( 189      ( 25 May 2022 04:34 )             47.1     05-25    136  |  102  |  48<H>  ----------------------------<  125<H>  4.3   |  23  |  1.91<H>    Ca    9.0      25 May 2022 04:34  Phos  4.7     05-25  Mg     2.1     05-25    TPro  6.6  /  Alb  3.6  /  TBili  1.7<H>  /  DBili  x   /  AST  21  /  ALT  12  /  AlkPhos  59  05-25        PT/INR - ( 24 May 2022 17:22 )   PT: 13.4 sec;   INR: 1.12          PTT - ( 24 May 2022 17:22 )  PTT:42.7 sec    I&O's Summary    24 May 2022 07:01  -  25 May 2022 07:00  --------------------------------------------------------  IN: 0 mL / OUT: 1800 mL / NET: -1800 mL      RADIOLOGY & ADDITIONAL STUDIES:    EKG:         Heart Failure Consult Note    HPI: 66M Mixed Ischemic/NICM Cardiomyopathy (EF 25-30% at bseline, s/p BIV-ICD), CAD (medically managed MIs in 2008,2011, Most recent stent in April 2022 to mLAD) presented with multiple near syncope episodes to Dr. Cortez office, device showing VT episodes x41 up to 200 bpm that were ATP’ed out and sent to [facility] for evaluation. Underwent repeat cath, patent stent, slow flow with otherwise nonobstructive disease. Taken for ablation yesterday. EF noted significantly low, mapped with no obvious scar/substrate for VT ablation, requiring Epi/levo and brought to CCU.  Patient weaned off pressors. Patient states feeling well, no dizziness, mild fatigue. Denies chest pain or SOB.  States he walks 4 blocks without dyspnea, used to walk more prior to the pandemic but hasnt had to walk as far since so hasnt tried. No issues with stairs.      FHx: Unknown cardiomyopathy, CAD+  Social: Former tobacco use      PAST MEDICAL & SURGICAL HISTORY:  AV block - PPM dependent    Essential hypertension    Pure hypercholesterolemia    Myocardial infarct, old    Chronic HFrEF (heart failure with reduced ejection fraction)    Chronic kidney disease, unspecified CKD stage    CAD (coronary artery disease)    HLD (hyperlipidemia)    Type 2 diabetes mellitus    Artificial cardiac pacemaker        MEDICATIONS  (STANDING):  allopurinol 75 milliGRAM(s) Oral daily  aMIOdarone    Tablet 400 milliGRAM(s) Oral every 12 hours  aspirin enteric coated 81 milliGRAM(s) Oral daily  atorvastatin 80 milliGRAM(s) Oral at bedtime  carvedilol 12.5 milliGRAM(s) Oral every 12 hours  clopidogrel Tablet 75 milliGRAM(s) Oral daily  dextrose 5%. 1000 milliLiter(s) (50 mL/Hr) IV Continuous <Continuous>  dextrose 5%. 1000 milliLiter(s) (100 mL/Hr) IV Continuous <Continuous>  dextrose 50% Injectable 12.5 Gram(s) IV Push once  dextrose 50% Injectable 25 Gram(s) IV Push once  dextrose 50% Injectable 25 Gram(s) IV Push once  glucagon  Injectable 1 milliGRAM(s) IntraMuscular once  heparin   Injectable 5000 Unit(s) SubCutaneous every 8 hours  insulin lispro (ADMELOG) corrective regimen sliding scale   SubCutaneous Before meals and at bedtime  pantoprazole    Tablet 40 milliGRAM(s) Oral before breakfast  ranolazine 500 milliGRAM(s) Oral two times a day  sacubitril 24 mG/valsartan 26 mG 1 Tablet(s) Oral two times a day  spironolactone 25 milliGRAM(s) Oral daily      Vital Signs Last 24 Hrs  T(C): 36.4 (25 May 2022 06:05), Max: 36.8 (25 May 2022 02:15)  T(F): 97.6 (25 May 2022 06:05), Max: 98.3 (25 May 2022 02:15)  HR: 69 (25 May 2022 08:00) (61 - 69)  BP: 116/79 (24 May 2022 17:00) (104/69 - 116/79)  BP(mean): 94 (24 May 2022 17:00) (94 - 94)  RR: 22 (25 May 2022 08:00) (15 - 22)  SpO2: 97% (25 May 2022 08:00) (93% - 100%)    PHYSICAL EXAM:  GEN: Awake, alert. NAD.   HEENT: JVP 6 cm H2O  RESP: CTA b/l  CV: RRR, S1, s2.  ABD: Soft. NT/ND  EXT: Warm. No edema  NEURO: AAOx3. No focal deficits.     LABS:                        15.7   8.14  )-----------( 189      ( 25 May 2022 04:34 )             47.1     05-25    136  |  102  |  48<H>  ----------------------------<  125<H>  4.3   |  23  |  1.91<H>    Ca    9.0      25 May 2022 04:34  Phos  4.7     05-25  Mg     2.1     05-25    TPro  6.6  /  Alb  3.6  /  TBili  1.7<H>  /  DBili  x   /  AST  21  /  ALT  12  /  AlkPhos  59  05-25        PT/INR - ( 24 May 2022 17:22 )   PT: 13.4 sec;   INR: 1.12          PTT - ( 24 May 2022 17:22 )  PTT:42.7 sec    I&O's Summary    24 May 2022 07:01  -  25 May 2022 07:00  --------------------------------------------------------  IN: 0 mL / OUT: 1800 mL / NET: -1800 mL

## 2022-05-25 NOTE — PHYSICAL THERAPY INITIAL EVALUATION ADULT - GENERAL OBSERVATIONS, REHAB EVAL
Spoke with [facility] Leon who cleared for PT. Patient received sitting in chair in NAD with +ECG +heplock +a-line

## 2022-05-25 NOTE — PROGRESS NOTE ADULT - SUBJECTIVE AND OBJECTIVE BOX
GOCOOL, LENNOX, 66y, Male  MRN-[id number]  Patient is a 66y old  Male who presents with a chief complaint of EPS s/p ablation (24 May 2022 14:51)      OVERNIGHT EVENTS: Bladderscan>627cc-->straight [id number]. Bladder scan 430am 427cc, able to urinate on his own 400 cc, pvr 0. groin checks wnl. held AM entresto, SBP 95 (hold parameter is 110).    SUBJECTIVE:    12 Point ROS Negative unless noted otherwise above.  -------------------------------------------------------------------------------  VITAL SIGNS:  Vital Signs Last 24 Hrs  T(C): 36.4 (25 May 2022 06:05), Max: 36.8 (25 May 2022 02:15)  T(F): 97.6 (25 May 2022 06:05), Max: 98.3 (25 May 2022 02:15)  HR: 69 (25 May 2022 07:00) (59 - 69)  BP: 116/79 (24 May 2022 17:00) (85/67 - 116/79)  BP(mean): 94 (24 May 2022 17:00) (94 - 94)  RR: 17 (25 May 2022 07:00) (15 - 22)  SpO2: 93% (25 May 2022 07:00) (93% - 100%)  I&O's Summary    24 May 2022 07:01  -  25 May 2022 07:00  --------------------------------------------------------  IN: 0 mL / OUT: 1800 mL / NET: -1800 mL        PHYSICAL EXAM:    General: NAD, well developed  HEENT: NC/AT; EOMI, PERRLA, anicteric sclera; moist mucosal membranes.  Neck: supple, trachea midline  Cardiovascular: RRR, +S1/S2; NO M/R/G  Respiratory: CTA B/L; no W/R/R  Gastrointestinal: soft, NT/ND; +BSx4  Extremities: WWP; no edema or cyanosis  Vascular: 2+ radial, DP/PT pulses B/L  Neurological: AAOx3; no focal deficits    ALLERGIES:  Allergies    No Known Allergies    Intolerances        MEDICATIONS:  MEDICATIONS  (STANDING):  allopurinol 75 milliGRAM(s) Oral daily  aMIOdarone    Tablet 400 milliGRAM(s) Oral every 12 hours  aspirin enteric coated 81 milliGRAM(s) Oral daily  atorvastatin 80 milliGRAM(s) Oral at bedtime  carvedilol 12.5 milliGRAM(s) Oral every 12 hours  clopidogrel Tablet 75 milliGRAM(s) Oral daily  dextrose 5%. 1000 milliLiter(s) (50 mL/Hr) IV Continuous <Continuous>  dextrose 5%. 1000 milliLiter(s) (100 mL/Hr) IV Continuous <Continuous>  dextrose 50% Injectable 12.5 Gram(s) IV Push once  dextrose 50% Injectable 25 Gram(s) IV Push once  dextrose 50% Injectable 25 Gram(s) IV Push once  glucagon  Injectable 1 milliGRAM(s) IntraMuscular once  heparin   Injectable 5000 Unit(s) SubCutaneous every 8 hours  insulin lispro (ADMELOG) corrective regimen sliding scale   SubCutaneous Before meals and at bedtime  pantoprazole    Tablet 40 milliGRAM(s) Oral before breakfast  ranolazine 500 milliGRAM(s) Oral two times a day  sacubitril 24 mG/valsartan 26 mG 1 Tablet(s) Oral two times a day  spironolactone 25 milliGRAM(s) Oral daily    MEDICATIONS  (PRN):  dextrose Oral Gel 15 Gram(s) Oral once PRN Blood Glucose LESS THAN 70 milliGRAM(s)/deciliter      -------------------------------------------------------------------------------  LABS:                        15.7   8.14  )-----------( 189      ( 25 May 2022 04:34 )             47.1     05-25    136  |  102  |  48<H>  ----------------------------<  125<H>  4.3   |  23  |  1.91<H>    Ca    9.0      25 May 2022 04:34  Phos  4.7     05-25  Mg     2.1     05-25    TPro  6.6  /  Alb  3.6  /  TBili  1.7<H>  /  DBili  x   /  AST  21  /  ALT  12  /  AlkPhos  59  05-25    LIVER FUNCTIONS - ( 25 May 2022 04:34 )  Alb: 3.6 g/dL / Pro: 6.6 g/dL / ALK PHOS: 59 U/L / ALT: 12 U/L / AST: 21 U/L / GGT: x           PT/INR - ( 24 May 2022 17:22 )   PT: 13.4 sec;   INR: 1.12          PTT - ( 24 May 2022 17:22 )  PTT:42.7 sec    CAPILLARY BLOOD GLUCOSE      POCT Blood Glucose.: 176 mg/dL (25 May 2022 06:24)      COVID-19 PCR: Negative (20 May 2022 14:17)      RADIOLOGY & ADDITIONAL TESTS: Reviewed.       GOCOOL, LENNOX, 66y, Male  MRN-[id number]  Patient is a 66y old  Male who presents with a chief complaint of EPS s/p ablation (24 May 2022 14:51)      OVERNIGHT EVENTS: Bladderscan>627cc-->straight [id number]. Bladder scan 430am 427cc, able to urinate on his own 400 cc, pvr 0. groin checks wnl. held AM entresto, SBP 95 (hold parameter is 110).    SUBJECTIVE: Patient seen and examined at bedside. Reports feeling fine. Denies fevers, chills, HA, chest pain, sob, nausea, vomiting, abdominal pain, diarrhea, constipation, dysuria.     12 Point ROS Negative unless noted otherwise above.  -------------------------------------------------------------------------------  VITAL SIGNS:  Vital Signs Last 24 Hrs  T(C): 36.4 (25 May 2022 06:05), Max: 36.8 (25 May 2022 02:15)  T(F): 97.6 (25 May 2022 06:05), Max: 98.3 (25 May 2022 02:15)  HR: 69 (25 May 2022 07:00) (59 - 69)  BP: 116/79 (24 May 2022 17:00) (85/67 - 116/79)  BP(mean): 94 (24 May 2022 17:00) (94 - 94)  RR: 17 (25 May 2022 07:00) (15 - 22)  SpO2: 93% (25 May 2022 07:00) (93% - 100%)  I&O's Summary    24 May 2022 07:01  -  25 May 2022 07:00  --------------------------------------------------------  IN: 0 mL / OUT: 1800 mL / NET: -1800 mL        PHYSICAL EXAM:    General: NAD, well developed  HEENT: NC/AT; EOMI, PERRLA, anicteric sclera; moist mucosal membranes.  Neck: supple, trachea midline  Cardiovascular: RRR, +S1/S2; NO M/R/G  Respiratory: CTA B/L; no W/R/R  Gastrointestinal: soft, NT/ND; +BSx4  Extremities: WWP; no edema or cyanosis  Vascular: 2+ radial, DP/PT pulses B/L  Neurological: AAOx3; no focal deficits    ALLERGIES:  Allergies    No Known Allergies    Intolerances        MEDICATIONS:  MEDICATIONS  (STANDING):  allopurinol 75 milliGRAM(s) Oral daily  aMIOdarone    Tablet 400 milliGRAM(s) Oral every 12 hours  aspirin enteric coated 81 milliGRAM(s) Oral daily  atorvastatin 80 milliGRAM(s) Oral at bedtime  carvedilol 12.5 milliGRAM(s) Oral every 12 hours  clopidogrel Tablet 75 milliGRAM(s) Oral daily  dextrose 5%. 1000 milliLiter(s) (50 mL/Hr) IV Continuous <Continuous>  dextrose 5%. 1000 milliLiter(s) (100 mL/Hr) IV Continuous <Continuous>  dextrose 50% Injectable 12.5 Gram(s) IV Push once  dextrose 50% Injectable 25 Gram(s) IV Push once  dextrose 50% Injectable 25 Gram(s) IV Push once  glucagon  Injectable 1 milliGRAM(s) IntraMuscular once  heparin   Injectable 5000 Unit(s) SubCutaneous every 8 hours  insulin lispro (ADMELOG) corrective regimen sliding scale   SubCutaneous Before meals and at bedtime  pantoprazole    Tablet 40 milliGRAM(s) Oral before breakfast  ranolazine 500 milliGRAM(s) Oral two times a day  sacubitril 24 mG/valsartan 26 mG 1 Tablet(s) Oral two times a day  spironolactone 25 milliGRAM(s) Oral daily    MEDICATIONS  (PRN):  dextrose Oral Gel 15 Gram(s) Oral once PRN Blood Glucose LESS THAN 70 milliGRAM(s)/deciliter      -------------------------------------------------------------------------------  LABS:                        15.7   8.14  )-----------( 189      ( 25 May 2022 04:34 )             47.1     05-25    136  |  102  |  48<H>  ----------------------------<  125<H>  4.3   |  23  |  1.91<H>    Ca    9.0      25 May 2022 04:34  Phos  4.7     05-25  Mg     2.1     05-25    TPro  6.6  /  Alb  3.6  /  TBili  1.7<H>  /  DBili  x   /  AST  21  /  ALT  12  /  AlkPhos  59  05-25    LIVER FUNCTIONS - ( 25 May 2022 04:34 )  Alb: 3.6 g/dL / Pro: 6.6 g/dL / ALK PHOS: 59 U/L / ALT: 12 U/L / AST: 21 U/L / GGT: x           PT/INR - ( 24 May 2022 17:22 )   PT: 13.4 sec;   INR: 1.12          PTT - ( 24 May 2022 17:22 )  PTT:42.7 sec    CAPILLARY BLOOD GLUCOSE      POCT Blood Glucose.: 176 mg/dL (25 May 2022 06:24)      COVID-19 PCR: Negative (20 May 2022 14:17)      RADIOLOGY & ADDITIONAL TESTS: Reviewed.   HOSPITAL COURSE: 65 y/o male w/ PMHx HTN, HLD, type 2 DM, chronic HFrEF (EF 25-30% by Echo), complete heart block s/p PPM (in 2006, upgraded to BiV-ICD in 09/2016), CAD (s/p recent BERNABE mid LAD at [facility] on 04/18/2022), and stage II CKD (baseline Cr ~1.3) who was referred to Dr. Hess due to intermittent episodes of near syncope w/ palpitations and SOB since his recent cardiac  catheterization. At that time, patient's device was interrogated and showed back to back episodes of V-tach at 200+ bpm all terminating ATP and since his recent cath procedure patient has had 41 V-Tach episodes, all falling into V-fib zone. Patient was subsequently referred to [facility] for admission to cardiology/telemetry. Amiodarone load was initiated, patient intermittent was initially given IV diuretics for subjective shortness of breath, and subsequently reinitiated on his home medication of Lasix 40 mg PO daily. Patient's hospital course also initially complicated by gout flare, received Prednisone 40 mg PO x 1, and reinitiated on Allopurinol which patient takes at home; however, patient's home dose of Allopurinol 100 mg daily was decreased to Allopurinol 75 mg daily due to worsening renal function. Patient subsequently underwent cardiac catheterization on 05/23/2022 w/ Dr. Wagner which showed LM normal, proximal LAD mild disease w/ slow flow, mid LAD patent stent w/ slow flow, ostial D1 40-50% (small, unchanged), LCx/OM1 mild disease, and RCA/RPDA mild disease via right radial access. As recommended for Dr. Wagner post-procedure, patient was initiated on Ranexa 500 mg BID for slow flow. Patient was also reinitiated on outpatient prior outpatient Entresto dose as well as initiated on Farxiga (confirmed w/ pharmacy $0 copay but MUST be sent for 90 day supply). Patient subsequently was taken for EPS and possible ablation w/ EP on 05/24/2022, and transferred to CCU post EPS. Unable to perform ablation as study did not show any endocardial substrate, no ablations performed, also did not induce VT due to the severely reduced LVEF. Found to have elevated filling pressures on RHC and given lasix 40 IVP x2.       GOCOOL, LENNOX, 66y, Male  MRN-[id number]  Patient is a 66y old  Male who presents with a chief complaint of EPS s/p ablation (24 May 2022 14:51)      OVERNIGHT EVENTS: Bladderscan>627cc-->straight [id number]. Bladder scan 430am 427cc, able to urinate on his own 400 cc, pvr 0. groin checks wnl. held AM entresto, SBP 95 (hold parameter is 110).    SUBJECTIVE: Patient seen and examined at bedside. Reports feeling fine. Denies fevers, chills, HA, chest pain, sob, nausea, vomiting, abdominal pain, diarrhea, constipation, dysuria.     12 Point ROS Negative unless noted otherwise above.  -------------------------------------------------------------------------------  VITAL SIGNS:  Vital Signs Last 24 Hrs  T(C): 36.4 (25 May 2022 06:05), Max: 36.8 (25 May 2022 02:15)  T(F): 97.6 (25 May 2022 06:05), Max: 98.3 (25 May 2022 02:15)  HR: 69 (25 May 2022 07:00) (59 - 69)  BP: 116/79 (24 May 2022 17:00) (85/67 - 116/79)  BP(mean): 94 (24 May 2022 17:00) (94 - 94)  RR: 17 (25 May 2022 07:00) (15 - 22)  SpO2: 93% (25 May 2022 07:00) (93% - 100%)  I&O's Summary    24 May 2022 07:01  -  25 May 2022 07:00  --------------------------------------------------------  IN: 0 mL / OUT: 1800 mL / NET: -1800 mL        PHYSICAL EXAM:    General: NAD, well developed  HEENT: NC/AT; EOMI, PERRLA, anicteric sclera; moist mucosal membranes.  Neck: supple, trachea midline  Cardiovascular: RRR, +S1/S2; NO M/R/G  Respiratory: CTA B/L; no W/R/R  Gastrointestinal: soft, NT/ND; +BSx4  Extremities: WWP; no edema or cyanosis  Vascular: 2+ radial, DP/PT pulses B/L  Neurological: AAOx3; no focal deficits    ALLERGIES:  Allergies    No Known Allergies    Intolerances        MEDICATIONS:  MEDICATIONS  (STANDING):  allopurinol 75 milliGRAM(s) Oral daily  aMIOdarone    Tablet 400 milliGRAM(s) Oral every 12 hours  aspirin enteric coated 81 milliGRAM(s) Oral daily  atorvastatin 80 milliGRAM(s) Oral at bedtime  carvedilol 12.5 milliGRAM(s) Oral every 12 hours  clopidogrel Tablet 75 milliGRAM(s) Oral daily  dextrose 5%. 1000 milliLiter(s) (50 mL/Hr) IV Continuous <Continuous>  dextrose 5%. 1000 milliLiter(s) (100 mL/Hr) IV Continuous <Continuous>  dextrose 50% Injectable 12.5 Gram(s) IV Push once  dextrose 50% Injectable 25 Gram(s) IV Push once  dextrose 50% Injectable 25 Gram(s) IV Push once  glucagon  Injectable 1 milliGRAM(s) IntraMuscular once  heparin   Injectable 5000 Unit(s) SubCutaneous every 8 hours  insulin lispro (ADMELOG) corrective regimen sliding scale   SubCutaneous Before meals and at bedtime  pantoprazole    Tablet 40 milliGRAM(s) Oral before breakfast  ranolazine 500 milliGRAM(s) Oral two times a day  sacubitril 24 mG/valsartan 26 mG 1 Tablet(s) Oral two times a day  spironolactone 25 milliGRAM(s) Oral daily    MEDICATIONS  (PRN):  dextrose Oral Gel 15 Gram(s) Oral once PRN Blood Glucose LESS THAN 70 milliGRAM(s)/deciliter      -------------------------------------------------------------------------------  LABS:                        15.7   8.14  )-----------( 189      ( 25 May 2022 04:34 )             47.1     05-25    136  |  102  |  48<H>  ----------------------------<  125<H>  4.3   |  23  |  1.91<H>    Ca    9.0      25 May 2022 04:34  Phos  4.7     05-25  Mg     2.1     05-25    TPro  6.6  /  Alb  3.6  /  TBili  1.7<H>  /  DBili  x   /  AST  21  /  ALT  12  /  AlkPhos  59  05-25    LIVER FUNCTIONS - ( 25 May 2022 04:34 )  Alb: 3.6 g/dL / Pro: 6.6 g/dL / ALK PHOS: 59 U/L / ALT: 12 U/L / AST: 21 U/L / GGT: x           PT/INR - ( 24 May 2022 17:22 )   PT: 13.4 sec;   INR: 1.12          PTT - ( 24 May 2022 17:22 )  PTT:42.7 sec    CAPILLARY BLOOD GLUCOSE      POCT Blood Glucose.: 176 mg/dL (25 May 2022 06:24)      COVID-19 PCR: Negative (20 May 2022 14:17)      RADIOLOGY & ADDITIONAL TESTS: Reviewed.   HOSPITAL COURSE: 65 y/o male w/ PMHx HTN, HLD, type 2 DM, chronic HFrEF (EF 25-30% by Echo), complete heart block s/p PPM (in 2006, upgraded to BiV-ICD in 09/2016), CAD (s/p recent BERNABE mid LAD at [facility] on 04/18/2022), and stage II CKD (baseline Cr ~1.3) who was referred to Dr. Hess due to intermittent episodes of near syncope w/ palpitations and SOB since his recent cardiac  catheterization. At that time, patient's device was interrogated and showed back to back episodes of V-tach at 200+ bpm all terminating ATP and since his recent cath procedure patient has had 41 V-Tach episodes, all falling into V-fib zone. Patient was subsequently referred to [facility] for admission to cardiology/telemetry. Amiodarone load was initiated, patient intermittent was initially given IV diuretics for subjective shortness of breath, and subsequently reinitiated on his home medication of Lasix 40 mg PO daily. Patient's hospital course also initially complicated by gout flare, received Prednisone 40 mg PO x 1, and reinitiated on Allopurinol which patient takes at home; however, patient's home dose of Allopurinol 100 mg daily was decreased to Allopurinol 75 mg daily due to worsening renal function. Patient subsequently underwent cardiac catheterization on 05/23/2022 w/ Dr. Wagner which showed LM normal, proximal LAD mild disease w/ slow flow, mid LAD patent stent w/ slow flow, ostial D1 40-50% (small, unchanged), LCx/OM1 mild disease, and RCA/RPDA mild disease via right radial access. As recommended for Dr. Wagner post-procedure, patient was initiated on Ranexa 500 mg BID for slow flow. Patient was also reinitiated on outpatient prior outpatient Entresto dose. Started on Farxiga (confirmed w/ pharmacy $0 copay but MUST be sent for 90 day supply) (dc'ed on CCU as patient doesn't need to be on med). Patient subsequently was taken for EPS and possible ablation w/ EP on 05/24/2022, and transferred to CCU post EPS. Unable to perform ablation as study did not show any endocardial substrate, no ablations performed, also did not induce VT due to the severely reduced LVEF. Found to have elevated filling pressures on RHC and given lasix 40 IVP x2.       GOCOOL, LENNOX, 66y, Male  MRN-[id number]  Patient is a 66y old  Male who presents with a chief complaint of EPS s/p ablation (24 May 2022 14:51)      OVERNIGHT EVENTS: Bladderscan>627cc-->straight [id number]. Bladder scan 430am 427cc, able to urinate on his own 400 cc, pvr 0. groin checks wnl. held AM entresto, SBP 95 (hold parameter is 110).    SUBJECTIVE: Patient seen and examined at bedside. Reports feeling fine. Denies fevers, chills, HA, chest pain, sob, nausea, vomiting, abdominal pain, diarrhea, constipation, dysuria.     12 Point ROS Negative unless noted otherwise above.  -------------------------------------------------------------------------------  VITAL SIGNS:  Vital Signs Last 24 Hrs  T(C): 36.4 (25 May 2022 06:05), Max: 36.8 (25 May 2022 02:15)  T(F): 97.6 (25 May 2022 06:05), Max: 98.3 (25 May 2022 02:15)  HR: 69 (25 May 2022 07:00) (59 - 69)  BP: 116/79 (24 May 2022 17:00) (85/67 - 116/79)  BP(mean): 94 (24 May 2022 17:00) (94 - 94)  RR: 17 (25 May 2022 07:00) (15 - 22)  SpO2: 93% (25 May 2022 07:00) (93% - 100%)  I&O's Summary    24 May 2022 07:01  -  25 May 2022 07:00  --------------------------------------------------------  IN: 0 mL / OUT: 1800 mL / NET: -1800 mL        PHYSICAL EXAM:    General: NAD, well developed  HEENT: NC/AT; EOMI, PERRLA, anicteric sclera; moist mucosal membranes.  Neck: supple, trachea midline  Cardiovascular: RRR, +S1/S2; NO M/R/G  Respiratory: CTA B/L; no W/R/R  Gastrointestinal: soft, NT/ND; +BSx4  Extremities: WWP; no edema or cyanosis  Vascular: 2+ radial, DP/PT pulses B/L  Neurological: AAOx3; no focal deficits    ALLERGIES:  Allergies    No Known Allergies    Intolerances        MEDICATIONS:  MEDICATIONS  (STANDING):  allopurinol 75 milliGRAM(s) Oral daily  aMIOdarone    Tablet 400 milliGRAM(s) Oral every 12 hours  aspirin enteric coated 81 milliGRAM(s) Oral daily  atorvastatin 80 milliGRAM(s) Oral at bedtime  carvedilol 12.5 milliGRAM(s) Oral every 12 hours  clopidogrel Tablet 75 milliGRAM(s) Oral daily  dextrose 5%. 1000 milliLiter(s) (50 mL/Hr) IV Continuous <Continuous>  dextrose 5%. 1000 milliLiter(s) (100 mL/Hr) IV Continuous <Continuous>  dextrose 50% Injectable 12.5 Gram(s) IV Push once  dextrose 50% Injectable 25 Gram(s) IV Push once  dextrose 50% Injectable 25 Gram(s) IV Push once  glucagon  Injectable 1 milliGRAM(s) IntraMuscular once  heparin   Injectable 5000 Unit(s) SubCutaneous every 8 hours  insulin lispro (ADMELOG) corrective regimen sliding scale   SubCutaneous Before meals and at bedtime  pantoprazole    Tablet 40 milliGRAM(s) Oral before breakfast  ranolazine 500 milliGRAM(s) Oral two times a day  sacubitril 24 mG/valsartan 26 mG 1 Tablet(s) Oral two times a day  spironolactone 25 milliGRAM(s) Oral daily    MEDICATIONS  (PRN):  dextrose Oral Gel 15 Gram(s) Oral once PRN Blood Glucose LESS THAN 70 milliGRAM(s)/deciliter      -------------------------------------------------------------------------------  LABS:                        15.7   8.14  )-----------( 189      ( 25 May 2022 04:34 )             47.1     05-25    136  |  102  |  48<H>  ----------------------------<  125<H>  4.3   |  23  |  1.91<H>    Ca    9.0      25 May 2022 04:34  Phos  4.7     05-25  Mg     2.1     05-25    TPro  6.6  /  Alb  3.6  /  TBili  1.7<H>  /  DBili  x   /  AST  21  /  ALT  12  /  AlkPhos  59  05-25    LIVER FUNCTIONS - ( 25 May 2022 04:34 )  Alb: 3.6 g/dL / Pro: 6.6 g/dL / ALK PHOS: 59 U/L / ALT: 12 U/L / AST: 21 U/L / GGT: x           PT/INR - ( 24 May 2022 17:22 )   PT: 13.4 sec;   INR: 1.12          PTT - ( 24 May 2022 17:22 )  PTT:42.7 sec    CAPILLARY BLOOD GLUCOSE      POCT Blood Glucose.: 176 mg/dL (25 May 2022 06:24)      COVID-19 PCR: Negative (20 May 2022 14:17)      RADIOLOGY & ADDITIONAL TESTS: Reviewed.   HOSPITAL COURSE: 65 y/o male w/ PMHx HTN, HLD, type 2 DM, chronic HFrEF (EF 25-30% by Echo), complete heart block s/p PPM (in 2006, upgraded to BiV-ICD in 09/2016), CAD (s/p recent BERNABE mid LAD at [facility] on 04/18/2022), and stage II CKD (baseline Cr ~1.3) who was referred to Dr. Hess due to intermittent episodes of near syncope w/ palpitations and SOB since his recent cardiac  catheterization. At that time, patient's device was interrogated and showed back to back episodes of V-tach at 200+ bpm all terminating ATP and since his recent cath procedure patient has had 41 V-Tach episodes, all falling into V-fib zone. Patient was subsequently referred to [facility] for admission to cardiology/telemetry. Amiodarone load was initiated, patient intermittent was initially given IV diuretics for subjective shortness of breath, and subsequently reinitiated on his home medication of Lasix 40 mg PO daily. Patient's hospital course also initially complicated by gout flare, received Prednisone 40 mg PO x 1, and reinitiated on Allopurinol which patient takes at home; however, patient's home dose of Allopurinol 100 mg daily was decreased to Allopurinol 75 mg daily due to worsening renal function. Patient subsequently underwent cardiac catheterization on 05/23/2022 w/ Dr. Wagner which showed LM normal, proximal LAD mild disease w/ slow flow, mid LAD patent stent w/ slow flow, ostial D1 40-50% (small, unchanged), LCx/OM1 mild disease, and RCA/RPDA mild disease via right radial access. As recommended for Dr. Wagner post-procedure, patient was initiated on Ranexa 500 mg BID for slow flow. Patient was also reinitiated on outpatient prior outpatient Entresto dose. Started on Farxiga (confirmed w/ pharmacy $0 copay but MUST be sent for 90 day supply) (dc'ed in CCU, will restart on discharge). Patient subsequently was taken for EPS and possible ablation w/ EP on 05/24/2022, and transferred to CCU post EPS. Unable to perform ablation as study did not show any endocardial substrate, no ablations performed, also did not induce VT due to the severely reduced LVEF. Found to have elevated filling pressures on RHC and given lasix 40 IVP x2.       GOCOOL, LENNOX, 66y, Male  MRN-[id number]  Patient is a 66y old  Male who presents with a chief complaint of EPS s/p ablation (24 May 2022 14:51)      OVERNIGHT EVENTS: Bladderscan>627cc-->straight [id number]. Bladder scan 430am 427cc, able to urinate on his own 400 cc, pvr 0. groin checks wnl. held AM entresto, SBP 95 (hold parameter is 110).    SUBJECTIVE: Patient seen and examined at bedside. Reports feeling fine. Denies fevers, chills, HA, chest pain, sob, nausea, vomiting, abdominal pain, diarrhea, constipation, dysuria.     12 Point ROS Negative unless noted otherwise above.  -------------------------------------------------------------------------------  VITAL SIGNS:  Vital Signs Last 24 Hrs  T(C): 36.4 (25 May 2022 06:05), Max: 36.8 (25 May 2022 02:15)  T(F): 97.6 (25 May 2022 06:05), Max: 98.3 (25 May 2022 02:15)  HR: 69 (25 May 2022 07:00) (59 - 69)  BP: 116/79 (24 May 2022 17:00) (85/67 - 116/79)  BP(mean): 94 (24 May 2022 17:00) (94 - 94)  RR: 17 (25 May 2022 07:00) (15 - 22)  SpO2: 93% (25 May 2022 07:00) (93% - 100%)  I&O's Summary    24 May 2022 07:01  -  25 May 2022 07:00  --------------------------------------------------------  IN: 0 mL / OUT: 1800 mL / NET: -1800 mL        PHYSICAL EXAM:    General: NAD, well developed  HEENT: NC/AT; EOMI, PERRLA, anicteric sclera; moist mucosal membranes.  Neck: supple, trachea midline  Cardiovascular: RRR, +S1/S2; NO M/R/G  Respiratory: CTA B/L; no W/R/R  Gastrointestinal: soft, NT/ND; +BSx4  Extremities: WWP; no edema or cyanosis  Vascular: 2+ radial, DP/PT pulses B/L  Neurological: AAOx3; no focal deficits    ALLERGIES:  Allergies    No Known Allergies    Intolerances        MEDICATIONS:  MEDICATIONS  (STANDING):  allopurinol 75 milliGRAM(s) Oral daily  aMIOdarone    Tablet 400 milliGRAM(s) Oral every 12 hours  aspirin enteric coated 81 milliGRAM(s) Oral daily  atorvastatin 80 milliGRAM(s) Oral at bedtime  carvedilol 12.5 milliGRAM(s) Oral every 12 hours  clopidogrel Tablet 75 milliGRAM(s) Oral daily  dextrose 5%. 1000 milliLiter(s) (50 mL/Hr) IV Continuous <Continuous>  dextrose 5%. 1000 milliLiter(s) (100 mL/Hr) IV Continuous <Continuous>  dextrose 50% Injectable 12.5 Gram(s) IV Push once  dextrose 50% Injectable 25 Gram(s) IV Push once  dextrose 50% Injectable 25 Gram(s) IV Push once  glucagon  Injectable 1 milliGRAM(s) IntraMuscular once  heparin   Injectable 5000 Unit(s) SubCutaneous every 8 hours  insulin lispro (ADMELOG) corrective regimen sliding scale   SubCutaneous Before meals and at bedtime  pantoprazole    Tablet 40 milliGRAM(s) Oral before breakfast  ranolazine 500 milliGRAM(s) Oral two times a day  sacubitril 24 mG/valsartan 26 mG 1 Tablet(s) Oral two times a day  spironolactone 25 milliGRAM(s) Oral daily    MEDICATIONS  (PRN):  dextrose Oral Gel 15 Gram(s) Oral once PRN Blood Glucose LESS THAN 70 milliGRAM(s)/deciliter      -------------------------------------------------------------------------------  LABS:                        15.7   8.14  )-----------( 189      ( 25 May 2022 04:34 )             47.1     05-25    136  |  102  |  48<H>  ----------------------------<  125<H>  4.3   |  23  |  1.91<H>    Ca    9.0      25 May 2022 04:34  Phos  4.7     05-25  Mg     2.1     05-25    TPro  6.6  /  Alb  3.6  /  TBili  1.7<H>  /  DBili  x   /  AST  21  /  ALT  12  /  AlkPhos  59  05-25    LIVER FUNCTIONS - ( 25 May 2022 04:34 )  Alb: 3.6 g/dL / Pro: 6.6 g/dL / ALK PHOS: 59 U/L / ALT: 12 U/L / AST: 21 U/L / GGT: x           PT/INR - ( 24 May 2022 17:22 )   PT: 13.4 sec;   INR: 1.12          PTT - ( 24 May 2022 17:22 )  PTT:42.7 sec    CAPILLARY BLOOD GLUCOSE      POCT Blood Glucose.: 176 mg/dL (25 May 2022 06:24)      COVID-19 PCR: Negative (20 May 2022 14:17)      RADIOLOGY & ADDITIONAL TESTS: Reviewed.   HOSPITAL COURSE: 65 y/o male w/ PMHx HTN, HLD, type 2 DM, chronic HFrEF (EF 25-30% by Echo), complete heart block s/p PPM (in 2006, upgraded to BiV-ICD in 09/2016), CAD (s/p recent BERNABE mid LAD at [facility] on 04/18/2022), and stage II CKD (baseline Cr ~1.3) who was referred to Dr. Hess due to intermittent episodes of near syncope w/ palpitations and SOB since his recent cardiac  catheterization. At that time, patient's device was interrogated and showed back to back episodes of V-tach at 200+ bpm all terminating ATP and since his recent cath procedure patient has had 41 V-Tach episodes, all falling into V-fib zone. Patient was subsequently referred to [facility] for admission to cardiology/telemetry. Amiodarone load was initiated, patient intermittent was initially given IV diuretics for subjective shortness of breath, and subsequently reinitiated on his home medication of Lasix 40 mg PO daily. Patient's hospital course also initially complicated by gout flare, received Prednisone 40 mg PO x 1, and reinitiated on Allopurinol which patient takes at home; however, patient's home dose of Allopurinol 100 mg daily was decreased to Allopurinol 75 mg daily due to worsening renal function. Patient subsequently underwent cardiac catheterization on 05/23/2022 w/ Dr. Wagner which showed LM normal, proximal LAD mild disease w/ slow flow, mid LAD patent stent w/ slow flow, ostial D1 40-50% (small, unchanged), LCx/OM1 mild disease, and RCA/RPDA mild disease via right radial access. As recommended for Dr. Wagner post-procedure, patient was initiated on Ranexa 500 mg BID for slow flow. Patient was also reinitiated on outpatient prior outpatient Entresto dose. Started on Farxiga (confirmed w/ pharmacy $0 copay but MUST be sent for 90 day supply) (dc'ed in CCU, will restart on discharge). Patient subsequently was taken for EPS and possible ablation w/ EP on 05/24/2022, and transferred to CCU post EPS. Unable to perform ablation as study did not show any endocardial substrate, no ablations performed, also did not induce VT due to the severely reduced LVEF. Found to have elevated filling pressures on RHC and given lasix 40 IVP x2 with 1.8L urine output.       GOCOOL, LENNOX, 66y, Male  MRN-[id number]  Patient is a 66y old  Male who presents with a chief complaint of EPS s/p ablation (24 May 2022 14:51)      OVERNIGHT EVENTS: Bladderscan>627cc-->straight [id number]. Bladder scan 430am 427cc, able to urinate on his own 400 cc, pvr 0. groin checks wnl. held AM entresto, SBP 95 (hold parameter is 110).    SUBJECTIVE: Patient seen and examined at bedside. Reports feeling fine. Denies fevers, chills, HA, chest pain, sob, nausea, vomiting, abdominal pain, diarrhea, constipation, dysuria.     12 Point ROS Negative unless noted otherwise above.  -------------------------------------------------------------------------------  VITAL SIGNS:  Vital Signs Last 24 Hrs  T(C): 36.4 (25 May 2022 06:05), Max: 36.8 (25 May 2022 02:15)  T(F): 97.6 (25 May 2022 06:05), Max: 98.3 (25 May 2022 02:15)  HR: 69 (25 May 2022 07:00) (59 - 69)  BP: 116/79 (24 May 2022 17:00) (85/67 - 116/79)  BP(mean): 94 (24 May 2022 17:00) (94 - 94)  RR: 17 (25 May 2022 07:00) (15 - 22)  SpO2: 93% (25 May 2022 07:00) (93% - 100%)  I&O's Summary    24 May 2022 07:01  -  25 May 2022 07:00  --------------------------------------------------------  IN: 0 mL / OUT: 1800 mL / NET: -1800 mL        PHYSICAL EXAM:    General: NAD, well developed  HEENT: NC/AT; EOMI, PERRLA, anicteric sclera; moist mucosal membranes.  Neck: supple, trachea midline  Cardiovascular: RRR, +S1/S2; NO M/R/G  Respiratory: CTA B/L; no W/R/R  Gastrointestinal: soft, NT/ND; +BSx4  Extremities: WWP; no edema or cyanosis  Vascular: 2+ radial, DP/PT pulses B/L  Neurological: AAOx3; no focal deficits    ALLERGIES:  Allergies    No Known Allergies    Intolerances        MEDICATIONS:  MEDICATIONS  (STANDING):  allopurinol 75 milliGRAM(s) Oral daily  aMIOdarone    Tablet 400 milliGRAM(s) Oral every 12 hours  aspirin enteric coated 81 milliGRAM(s) Oral daily  atorvastatin 80 milliGRAM(s) Oral at bedtime  carvedilol 12.5 milliGRAM(s) Oral every 12 hours  clopidogrel Tablet 75 milliGRAM(s) Oral daily  dextrose 5%. 1000 milliLiter(s) (50 mL/Hr) IV Continuous <Continuous>  dextrose 5%. 1000 milliLiter(s) (100 mL/Hr) IV Continuous <Continuous>  dextrose 50% Injectable 12.5 Gram(s) IV Push once  dextrose 50% Injectable 25 Gram(s) IV Push once  dextrose 50% Injectable 25 Gram(s) IV Push once  glucagon  Injectable 1 milliGRAM(s) IntraMuscular once  heparin   Injectable 5000 Unit(s) SubCutaneous every 8 hours  insulin lispro (ADMELOG) corrective regimen sliding scale   SubCutaneous Before meals and at bedtime  pantoprazole    Tablet 40 milliGRAM(s) Oral before breakfast  ranolazine 500 milliGRAM(s) Oral two times a day  sacubitril 24 mG/valsartan 26 mG 1 Tablet(s) Oral two times a day  spironolactone 25 milliGRAM(s) Oral daily    MEDICATIONS  (PRN):  dextrose Oral Gel 15 Gram(s) Oral once PRN Blood Glucose LESS THAN 70 milliGRAM(s)/deciliter      -------------------------------------------------------------------------------  LABS:                        15.7   8.14  )-----------( 189      ( 25 May 2022 04:34 )             47.1     05-25    136  |  102  |  48<H>  ----------------------------<  125<H>  4.3   |  23  |  1.91<H>    Ca    9.0      25 May 2022 04:34  Phos  4.7     05-25  Mg     2.1     05-25    TPro  6.6  /  Alb  3.6  /  TBili  1.7<H>  /  DBili  x   /  AST  21  /  ALT  12  /  AlkPhos  59  05-25    LIVER FUNCTIONS - ( 25 May 2022 04:34 )  Alb: 3.6 g/dL / Pro: 6.6 g/dL / ALK PHOS: 59 U/L / ALT: 12 U/L / AST: 21 U/L / GGT: x           PT/INR - ( 24 May 2022 17:22 )   PT: 13.4 sec;   INR: 1.12          PTT - ( 24 May 2022 17:22 )  PTT:42.7 sec    CAPILLARY BLOOD GLUCOSE      POCT Blood Glucose.: 176 mg/dL (25 May 2022 06:24)      COVID-19 PCR: Negative (20 May 2022 14:17)      RADIOLOGY & ADDITIONAL TESTS: Reviewed.   HOSPITAL COURSE: 65 y/o male w/ PMHx HTN, HLD, type 2 DM, chronic HFrEF (EF 25-30% by Echo), complete heart block s/p PPM (in 2006, upgraded to BiV-ICD in 09/2016), CAD (s/p recent BERNABE mid LAD at [facility] on 04/18/2022), and stage II CKD (baseline Cr ~1.3) who was referred by Dr. Hess due to intermittent episodes of near syncope w/ palpitations and SOB since his recent cardiac catheterization. Patient's device was interrogated which showed multiple episodes of vtach. Patient underwent cardiac catheterization on 05/23/2022 w/ Dr. Wagner which showed no occlusive disease: LM normal, proximal LAD mild disease w/ slow flow, mid LAD patent stent w/ slow flow, ostial D1 40-50% (small, unchanged), LCx/OM1 mild disease, and RCA/RPDA mild disease via right radial access. Patient was initiated on Entresto. Started on Farxiga, discharged in CCU, will restart on discharge. Patient subsequently was taken for EPS and possible ablation w/ EP on 05/24/2022. Unable to perform ablation as study did not show any endocardial substrate, also did not induce VT due to the severely reduced LVEF. Found to have elevated filling pressures on RHC and transferred to CCU. Given lasix 40 IVP x2 with 1.8L urine output.       GOCOOL, LENNOX, 66y, Male  MRN-[id number]  Patient is a 66y old  Male who presents with a chief complaint of EPS s/p ablation (24 May 2022 14:51)      OVERNIGHT EVENTS: Bladderscan>627cc-->straight [id number]. Bladder scan 430am 427cc, able to urinate on his own 400 cc, pvr 0. groin checks wnl. held AM entresto, SBP 95 (hold parameter is 110).    SUBJECTIVE: Patient seen and examined at bedside. Reports feeling fine. Denies fevers, chills, HA, chest pain, sob, nausea, vomiting, abdominal pain, diarrhea, constipation, dysuria.     12 Point ROS Negative unless noted otherwise above.  -------------------------------------------------------------------------------  VITAL SIGNS:  Vital Signs Last 24 Hrs  T(C): 36.4 (25 May 2022 06:05), Max: 36.8 (25 May 2022 02:15)  T(F): 97.6 (25 May 2022 06:05), Max: 98.3 (25 May 2022 02:15)  HR: 69 (25 May 2022 07:00) (59 - 69)  BP: 116/79 (24 May 2022 17:00) (85/67 - 116/79)  BP(mean): 94 (24 May 2022 17:00) (94 - 94)  RR: 17 (25 May 2022 07:00) (15 - 22)  SpO2: 93% (25 May 2022 07:00) (93% - 100%)  I&O's Summary    24 May 2022 07:01  -  25 May 2022 07:00  --------------------------------------------------------  IN: 0 mL / OUT: 1800 mL / NET: -1800 mL        PHYSICAL EXAM:    General: NAD, well developed  HEENT: NC/AT; EOMI, PERRLA, anicteric sclera; moist mucosal membranes.  Neck: supple, trachea midline  Cardiovascular: RRR, +S1/S2; NO M/R/G  Respiratory: CTA B/L; no W/R/R  Gastrointestinal: soft, NT/ND; +BSx4  Extremities: WWP; no edema or cyanosis  Vascular: 2+ radial, DP/PT pulses B/L  Neurological: AAOx3; no focal deficits    ALLERGIES:  Allergies    No Known Allergies    Intolerances        MEDICATIONS:  MEDICATIONS  (STANDING):  allopurinol 75 milliGRAM(s) Oral daily  aMIOdarone    Tablet 400 milliGRAM(s) Oral every 12 hours  aspirin enteric coated 81 milliGRAM(s) Oral daily  atorvastatin 80 milliGRAM(s) Oral at bedtime  carvedilol 12.5 milliGRAM(s) Oral every 12 hours  clopidogrel Tablet 75 milliGRAM(s) Oral daily  dextrose 5%. 1000 milliLiter(s) (50 mL/Hr) IV Continuous <Continuous>  dextrose 5%. 1000 milliLiter(s) (100 mL/Hr) IV Continuous <Continuous>  dextrose 50% Injectable 12.5 Gram(s) IV Push once  dextrose 50% Injectable 25 Gram(s) IV Push once  dextrose 50% Injectable 25 Gram(s) IV Push once  glucagon  Injectable 1 milliGRAM(s) IntraMuscular once  heparin   Injectable 5000 Unit(s) SubCutaneous every 8 hours  insulin lispro (ADMELOG) corrective regimen sliding scale   SubCutaneous Before meals and at bedtime  pantoprazole    Tablet 40 milliGRAM(s) Oral before breakfast  ranolazine 500 milliGRAM(s) Oral two times a day  sacubitril 24 mG/valsartan 26 mG 1 Tablet(s) Oral two times a day  spironolactone 25 milliGRAM(s) Oral daily    MEDICATIONS  (PRN):  dextrose Oral Gel 15 Gram(s) Oral once PRN Blood Glucose LESS THAN 70 milliGRAM(s)/deciliter      -------------------------------------------------------------------------------  LABS:                        15.7   8.14  )-----------( 189      ( 25 May 2022 04:34 )             47.1     05-25    136  |  102  |  48<H>  ----------------------------<  125<H>  4.3   |  23  |  1.91<H>    Ca    9.0      25 May 2022 04:34  Phos  4.7     05-25  Mg     2.1     05-25    TPro  6.6  /  Alb  3.6  /  TBili  1.7<H>  /  DBili  x   /  AST  21  /  ALT  12  /  AlkPhos  59  05-25    LIVER FUNCTIONS - ( 25 May 2022 04:34 )  Alb: 3.6 g/dL / Pro: 6.6 g/dL / ALK PHOS: 59 U/L / ALT: 12 U/L / AST: 21 U/L / GGT: x           PT/INR - ( 24 May 2022 17:22 )   PT: 13.4 sec;   INR: 1.12          PTT - ( 24 May 2022 17:22 )  PTT:42.7 sec    CAPILLARY BLOOD GLUCOSE      POCT Blood Glucose.: 176 mg/dL (25 May 2022 06:24)      COVID-19 PCR: Negative (20 May 2022 14:17)      RADIOLOGY & ADDITIONAL TESTS: Reviewed.

## 2022-05-25 NOTE — CONSULT NOTE ADULT - PROBLEM SELECTOR RECOMMENDATION 9
Etiology: Likely mostly a nonischemic cardiomyopathy with an ischemic component, now with significant VT  GDMT: Switch coreg to Toprol 25mg BID, Hold entresto at this time  Diuretic: Agree with holding diuretics.  Device: Biv-ICD upgraded 2016 - 41 VT episodes with ATP, no shock required, no substrate on EP mapping for ablation and given severely reduced function, no attempt at inducing VT.   - Will obtain RHC, NPO after midnight and given abandoned lead and BIV device, difficult swan therefore will be placed in cath lab  - After RHC will initiate transfer for transplant/LVad evaluation at [facility]  - Monitor strict I/O  - Continue A-line monitoring  - Monitor electrolytes to maintain K.4 and MG>2

## 2022-05-25 NOTE — PHYSICAL THERAPY INITIAL EVALUATION ADULT - GAIT DEVIATIONS NOTED, PT EVAL
patient ambulates with fairly steady gait, mildly antalgic, no LOB/decreased sandy/decreased step length

## 2022-05-25 NOTE — CONSULT NOTE ADULT - ASSESSMENT
ASSESSMENT:  66M Mixed Ischemic/NICM Cardiomyopathy (EF 25-30% at bseline, s/p BIV-ICD), CAD (medically managed MIs in 2008,2011, Most recent stent in April 2022 to mLAD) presented with multiple near syncope, found to have 41 episodes of VT and admitted initially to cardiac telemetry for further evaluation/management. Ischemic evaluation without new disease and VT ablation without substrate to complete ablation. Therefore, Advanced HF consulted for further evaluation of mixed ischemic/nonischemic cardiomyopathy.  ASSESSMENT:  66M Mixed Ischemic/NICM Cardiomyopathy (EF 25-30% at bseline, s/p BIV-ICD), CAD (medically managed MIs in 2008,2011, Most recent stent in April 2022 to mLAD) presented with multiple near syncope, found to have 41 episodes of VT and admitted initially to cardiac telemetry for further evaluation/management. Ischemic evaluation without new disease and VT ablation without substrate to complete ablation. Therefore, Advanced HF consulted for further evaluation of mixed ischemic/nonischemic cardiomyopathy.       Cardiac Studies:  Pressure measurements during EP study: RA 18, RV 66/8, LA 33, LV 98/24  TTE: LVEF 10-15%, LVIDD 5.2, abnormal septal motion due to RV pacing, D shaped flattening of IVS due to RV pressure overload, RV function reduced, RA dilated, Mild AI, Mild AS, PASP 51mmHg ASSESSMENT:  66M Mixed Ischemic/NICM Cardiomyopathy (EF 25-30% at bseline, s/p BIV-ICD), CAD (medically managed MIs in 2008,2011, Most recent stent in April 2022 to mLAD) presented with multiple near syncope, found to have 41 episodes of VT and admitted initially to cardiac telemetry for further evaluation/management. Ischemic evaluation without new disease and VT ablation without substrate to complete ablation. Therefore, Advanced HF consulted for further evaluation of mixed ischemic/nonischemic cardiomyopathy.       Cardiac Studies:  Pressure measurements during EP study: RA 18, RV 66/8, LA 33, LV 98/24  5/23/22 [facility]: LM Nl, pLAD mild disease, mLAD patent stent with slow flow, D1 oD1 40-50% (unchanged from previous [facility]), LCx, Mild disease, RCA mild disease, RPDA mild disease  4/18/22 [facility]: LM nl, mLAD 90% s/p BERNABE, LCx mild diffuse disease, OM1 mild diffuse, RCA diffuse disease.  TTE: LVEF 10-15%, LVIDD 5.2, abnormal septal motion due to RV pacing, D shaped flattening of IVS due to RV pressure overload, RV function reduced, RA dilated, Mild AI, Mild AS, PASP 51mmHg

## 2022-05-25 NOTE — CONSULT NOTE ADULT - PROBLEM SELECTOR RECOMMENDATION 2
As above 41 episodes of VT in setting of presyncopal symptoms, ATP'ed by device.   - Amiodarone 400 mg BID

## 2022-05-26 ENCOUNTER — INPATIENT (INPATIENT)
Facility: HOSPITAL | Age: 67
LOS: 42 days | Discharge: HOME HEALTH | DRG: 1 | End: 2022-07-08
Attending: STUDENT IN AN ORGANIZED HEALTH CARE EDUCATION/TRAINING PROGRAM | Admitting: INTERNAL MEDICINE
Payer: MEDICAID

## 2022-05-26 ENCOUNTER — TRANSCRIPTION ENCOUNTER (OUTPATIENT)
Age: 67
End: 2022-05-26

## 2022-05-26 VITALS — HEART RATE: 69 BPM | OXYGEN SATURATION: 95 % | RESPIRATION RATE: 26 BRPM

## 2022-05-26 VITALS
HEART RATE: 69 BPM | OXYGEN SATURATION: 95 % | RESPIRATION RATE: 31 BRPM | DIASTOLIC BLOOD PRESSURE: 77 MMHG | SYSTOLIC BLOOD PRESSURE: 106 MMHG

## 2022-05-26 DIAGNOSIS — I42.9 CARDIOMYOPATHY, UNSPECIFIED: ICD-10-CM

## 2022-05-26 DIAGNOSIS — Z95.0 PRESENCE OF CARDIAC PACEMAKER: Chronic | ICD-10-CM

## 2022-05-26 LAB
ALBUMIN SERPL ELPH-MCNC: 3.4 G/DL — SIGNIFICANT CHANGE UP (ref 3.3–5)
ALBUMIN SERPL ELPH-MCNC: 3.6 G/DL — SIGNIFICANT CHANGE UP (ref 3.3–5)
ALBUMIN SERPL ELPH-MCNC: 3.7 G/DL — SIGNIFICANT CHANGE UP (ref 3.3–5)
ALP SERPL-CCNC: 42 U/L — SIGNIFICANT CHANGE UP (ref 40–120)
ALP SERPL-CCNC: 55 U/L — SIGNIFICANT CHANGE UP (ref 40–120)
ALP SERPL-CCNC: 56 U/L — SIGNIFICANT CHANGE UP (ref 40–120)
ALT FLD-CCNC: 11 U/L — SIGNIFICANT CHANGE UP (ref 10–45)
ALT FLD-CCNC: 15 U/L — SIGNIFICANT CHANGE UP (ref 10–45)
ALT FLD-CCNC: 77 U/L — HIGH (ref 10–45)
ANION GAP SERPL CALC-SCNC: 11 MMOL/L — SIGNIFICANT CHANGE UP (ref 5–17)
ANION GAP SERPL CALC-SCNC: 11 MMOL/L — SIGNIFICANT CHANGE UP (ref 5–17)
ANION GAP SERPL CALC-SCNC: 12 MMOL/L — SIGNIFICANT CHANGE UP (ref 5–17)
APTT BLD: 30.3 SEC — SIGNIFICANT CHANGE UP (ref 27.5–35.5)
APTT BLD: 36.3 SEC — HIGH (ref 27.5–35.5)
AST SERPL-CCNC: 17 U/L — SIGNIFICANT CHANGE UP (ref 10–40)
AST SERPL-CCNC: 26 U/L — SIGNIFICANT CHANGE UP (ref 10–40)
AST SERPL-CCNC: 72 U/L — HIGH (ref 10–40)
BASE EXCESS BLDV CALC-SCNC: 0.5 MMOL/L — SIGNIFICANT CHANGE UP (ref -2–2)
BASE EXCESS BLDV CALC-SCNC: 1.8 MMOL/L — SIGNIFICANT CHANGE UP (ref -2–2)
BASE EXCESS BLDV CALC-SCNC: 2.6 MMOL/L — HIGH (ref -2–2)
BASOPHILS # BLD AUTO: 0.04 K/UL — SIGNIFICANT CHANGE UP (ref 0–0.2)
BASOPHILS # BLD AUTO: 0.05 K/UL — SIGNIFICANT CHANGE UP (ref 0–0.2)
BASOPHILS NFR BLD AUTO: 0.4 % — SIGNIFICANT CHANGE UP (ref 0–2)
BASOPHILS NFR BLD AUTO: 0.4 % — SIGNIFICANT CHANGE UP (ref 0–2)
BILIRUB SERPL-MCNC: 1.6 MG/DL — HIGH (ref 0.2–1.2)
BILIRUB SERPL-MCNC: 1.7 MG/DL — HIGH (ref 0.2–1.2)
BILIRUB SERPL-MCNC: 1.7 MG/DL — HIGH (ref 0.2–1.2)
BLD GP AB SCN SERPL QL: NEGATIVE — SIGNIFICANT CHANGE UP
BLD GP AB SCN SERPL QL: NEGATIVE — SIGNIFICANT CHANGE UP
BUN SERPL-MCNC: 44 MG/DL — HIGH (ref 7–23)
BUN SERPL-MCNC: 48 MG/DL — HIGH (ref 7–23)
BUN SERPL-MCNC: 49 MG/DL — HIGH (ref 7–23)
CA-I SERPL-SCNC: 1.16 MMOL/L — SIGNIFICANT CHANGE UP (ref 1.15–1.33)
CA-I SERPL-SCNC: 1.19 MMOL/L — SIGNIFICANT CHANGE UP (ref 1.15–1.33)
CA-I SERPL-SCNC: 1.22 MMOL/L — SIGNIFICANT CHANGE UP (ref 1.15–1.33)
CALCIUM SERPL-MCNC: 8.4 MG/DL — SIGNIFICANT CHANGE UP (ref 8.4–10.5)
CALCIUM SERPL-MCNC: 8.7 MG/DL — SIGNIFICANT CHANGE UP (ref 8.4–10.5)
CALCIUM SERPL-MCNC: 9.4 MG/DL — SIGNIFICANT CHANGE UP (ref 8.4–10.5)
CHLORIDE BLDV-SCNC: 96 MMOL/L — SIGNIFICANT CHANGE UP (ref 96–108)
CHLORIDE BLDV-SCNC: 96 MMOL/L — SIGNIFICANT CHANGE UP (ref 96–108)
CHLORIDE BLDV-SCNC: 97 MMOL/L — SIGNIFICANT CHANGE UP (ref 96–108)
CHLORIDE SERPL-SCNC: 101 MMOL/L — SIGNIFICANT CHANGE UP (ref 96–108)
CHLORIDE SERPL-SCNC: 98 MMOL/L — SIGNIFICANT CHANGE UP (ref 96–108)
CHLORIDE SERPL-SCNC: 98 MMOL/L — SIGNIFICANT CHANGE UP (ref 96–108)
CK MB BLD-MCNC: 1 % — SIGNIFICANT CHANGE UP (ref 0–3.5)
CK MB CFR SERPL CALC: 2.2 NG/ML — SIGNIFICANT CHANGE UP (ref 0–6.7)
CK SERPL-CCNC: 210 U/L — HIGH (ref 30–200)
CO2 BLDV-SCNC: 27 MMOL/L — HIGH (ref 22–26)
CO2 BLDV-SCNC: 29 MMOL/L — HIGH (ref 22–26)
CO2 BLDV-SCNC: 30 MMOL/L — HIGH (ref 22–26)
CO2 SERPL-SCNC: 17 MMOL/L — LOW (ref 22–31)
CO2 SERPL-SCNC: 20 MMOL/L — LOW (ref 22–31)
CO2 SERPL-SCNC: 23 MMOL/L — SIGNIFICANT CHANGE UP (ref 22–31)
CREAT SERPL-MCNC: 1.77 MG/DL — HIGH (ref 0.5–1.3)
CREAT SERPL-MCNC: 2.03 MG/DL — HIGH (ref 0.5–1.3)
CREAT SERPL-MCNC: 2.07 MG/DL — HIGH (ref 0.5–1.3)
EGFR: 35 ML/MIN/1.73M2 — LOW
EGFR: 36 ML/MIN/1.73M2 — LOW
EGFR: 42 ML/MIN/1.73M2 — LOW
EOSINOPHIL # BLD AUTO: 0.1 K/UL — SIGNIFICANT CHANGE UP (ref 0–0.5)
EOSINOPHIL # BLD AUTO: 0.14 K/UL — SIGNIFICANT CHANGE UP (ref 0–0.5)
EOSINOPHIL NFR BLD AUTO: 0.9 % — SIGNIFICANT CHANGE UP (ref 0–6)
EOSINOPHIL NFR BLD AUTO: 1.5 % — SIGNIFICANT CHANGE UP (ref 0–6)
GAS PNL BLDA: SIGNIFICANT CHANGE UP
GAS PNL BLDA: SIGNIFICANT CHANGE UP
GAS PNL BLDV: 127 MMOL/L — LOW (ref 136–145)
GAS PNL BLDV: 127 MMOL/L — LOW (ref 136–145)
GAS PNL BLDV: 128 MMOL/L — LOW (ref 136–145)
GAS PNL BLDV: SIGNIFICANT CHANGE UP
GLUCOSE BLDC GLUCOMTR-MCNC: 118 MG/DL — HIGH (ref 70–99)
GLUCOSE BLDC GLUCOMTR-MCNC: 127 MG/DL — HIGH (ref 70–99)
GLUCOSE BLDC GLUCOMTR-MCNC: 132 MG/DL — HIGH (ref 70–99)
GLUCOSE BLDV-MCNC: 135 MG/DL — HIGH (ref 70–99)
GLUCOSE BLDV-MCNC: 136 MG/DL — HIGH (ref 70–99)
GLUCOSE BLDV-MCNC: 149 MG/DL — HIGH (ref 70–99)
GLUCOSE SERPL-MCNC: 121 MG/DL — HIGH (ref 70–99)
GLUCOSE SERPL-MCNC: 132 MG/DL — HIGH (ref 70–99)
GLUCOSE SERPL-MCNC: 147 MG/DL — HIGH (ref 70–99)
HCO3 BLDV-SCNC: 26 MMOL/L — SIGNIFICANT CHANGE UP (ref 22–29)
HCO3 BLDV-SCNC: 28 MMOL/L — SIGNIFICANT CHANGE UP (ref 22–29)
HCO3 BLDV-SCNC: 28 MMOL/L — SIGNIFICANT CHANGE UP (ref 22–29)
HCT VFR BLD CALC: 45.7 % — SIGNIFICANT CHANGE UP (ref 39–50)
HCT VFR BLD CALC: 46.1 % — SIGNIFICANT CHANGE UP (ref 39–50)
HCT VFR BLDA CALC: 47 % — SIGNIFICANT CHANGE UP (ref 39–51)
HCT VFR BLDA CALC: 49 % — SIGNIFICANT CHANGE UP (ref 39–51)
HCT VFR BLDA CALC: 49 % — SIGNIFICANT CHANGE UP (ref 39–51)
HGB BLD CALC-MCNC: 15.8 G/DL — SIGNIFICANT CHANGE UP (ref 12.6–17.4)
HGB BLD CALC-MCNC: 16.4 G/DL — SIGNIFICANT CHANGE UP (ref 12.6–17.4)
HGB BLD CALC-MCNC: 16.4 G/DL — SIGNIFICANT CHANGE UP (ref 12.6–17.4)
HGB BLD-MCNC: 15.2 G/DL — SIGNIFICANT CHANGE UP (ref 13–17)
HGB BLD-MCNC: 15.3 G/DL — SIGNIFICANT CHANGE UP (ref 13–17)
HOROWITZ INDEX BLDV+IHG-RTO: 21 — SIGNIFICANT CHANGE UP
HOROWITZ INDEX BLDV+IHG-RTO: 21 — SIGNIFICANT CHANGE UP
IMM GRANULOCYTES NFR BLD AUTO: 0.3 % — SIGNIFICANT CHANGE UP (ref 0–1.5)
IMM GRANULOCYTES NFR BLD AUTO: 0.4 % — SIGNIFICANT CHANGE UP (ref 0–1.5)
INR BLD: 1.18 — HIGH (ref 0.88–1.16)
INR BLD: 1.2 RATIO — HIGH (ref 0.88–1.16)
LACTATE BLDV-MCNC: 1.2 MMOL/L — SIGNIFICANT CHANGE UP (ref 0.7–2)
LACTATE BLDV-MCNC: 1.7 MMOL/L — SIGNIFICANT CHANGE UP (ref 0.7–2)
LACTATE BLDV-MCNC: 1.7 MMOL/L — SIGNIFICANT CHANGE UP (ref 0.7–2)
LYMPHOCYTES # BLD AUTO: 2.49 K/UL — SIGNIFICANT CHANGE UP (ref 1–3.3)
LYMPHOCYTES # BLD AUTO: 22.1 % — SIGNIFICANT CHANGE UP (ref 13–44)
LYMPHOCYTES # BLD AUTO: 3.06 K/UL — SIGNIFICANT CHANGE UP (ref 1–3.3)
LYMPHOCYTES # BLD AUTO: 31.9 % — SIGNIFICANT CHANGE UP (ref 13–44)
MAGNESIUM SERPL-MCNC: 1.9 MG/DL — SIGNIFICANT CHANGE UP (ref 1.6–2.6)
MAGNESIUM SERPL-MCNC: 2.1 MG/DL — SIGNIFICANT CHANGE UP (ref 1.6–2.6)
MAGNESIUM SERPL-MCNC: 2.2 MG/DL — SIGNIFICANT CHANGE UP (ref 1.6–2.6)
MCHC RBC-ENTMCNC: 30.1 PG — SIGNIFICANT CHANGE UP (ref 27–34)
MCHC RBC-ENTMCNC: 30.4 PG — SIGNIFICANT CHANGE UP (ref 27–34)
MCHC RBC-ENTMCNC: 33 GM/DL — SIGNIFICANT CHANGE UP (ref 32–36)
MCHC RBC-ENTMCNC: 33.5 GM/DL — SIGNIFICANT CHANGE UP (ref 32–36)
MCV RBC AUTO: 90.7 FL — SIGNIFICANT CHANGE UP (ref 80–100)
MCV RBC AUTO: 91.3 FL — SIGNIFICANT CHANGE UP (ref 80–100)
MONOCYTES # BLD AUTO: 1 K/UL — HIGH (ref 0–0.9)
MONOCYTES # BLD AUTO: 1.35 K/UL — HIGH (ref 0–0.9)
MONOCYTES NFR BLD AUTO: 10.4 % — SIGNIFICANT CHANGE UP (ref 2–14)
MONOCYTES NFR BLD AUTO: 12 % — SIGNIFICANT CHANGE UP (ref 2–14)
NEUTROPHILS # BLD AUTO: 5.33 K/UL — SIGNIFICANT CHANGE UP (ref 1.8–7.4)
NEUTROPHILS # BLD AUTO: 7.23 K/UL — SIGNIFICANT CHANGE UP (ref 1.8–7.4)
NEUTROPHILS NFR BLD AUTO: 55.5 % — SIGNIFICANT CHANGE UP (ref 43–77)
NEUTROPHILS NFR BLD AUTO: 64.2 % — SIGNIFICANT CHANGE UP (ref 43–77)
NRBC # BLD: 0 /100 WBCS — SIGNIFICANT CHANGE UP (ref 0–0)
NRBC # BLD: 0 /100 WBCS — SIGNIFICANT CHANGE UP (ref 0–0)
NT-PROBNP SERPL-SCNC: 4206 PG/ML — HIGH (ref 0–300)
PCO2 BLDV: 44 MMHG — SIGNIFICANT CHANGE UP (ref 42–55)
PCO2 BLDV: 47 MMHG — SIGNIFICANT CHANGE UP (ref 42–55)
PCO2 BLDV: 47 MMHG — SIGNIFICANT CHANGE UP (ref 42–55)
PH BLDV: 7.38 — SIGNIFICANT CHANGE UP (ref 7.32–7.43)
PH BLDV: 7.38 — SIGNIFICANT CHANGE UP (ref 7.32–7.43)
PH BLDV: 7.39 — SIGNIFICANT CHANGE UP (ref 7.32–7.43)
PHOSPHATE SERPL-MCNC: 3 MG/DL — SIGNIFICANT CHANGE UP (ref 2.5–4.5)
PHOSPHATE SERPL-MCNC: 3.2 MG/DL — SIGNIFICANT CHANGE UP (ref 2.5–4.5)
PHOSPHATE SERPL-MCNC: 4.3 MG/DL — SIGNIFICANT CHANGE UP (ref 2.5–4.5)
PLATELET # BLD AUTO: 201 K/UL — SIGNIFICANT CHANGE UP (ref 150–400)
PLATELET # BLD AUTO: 241 K/UL — SIGNIFICANT CHANGE UP (ref 150–400)
PO2 BLDV: 24 MMHG — LOW (ref 25–45)
PO2 BLDV: 26 MMHG — SIGNIFICANT CHANGE UP (ref 25–45)
PO2 BLDV: 29 MMHG — SIGNIFICANT CHANGE UP (ref 25–45)
POTASSIUM BLDV-SCNC: 4.3 MMOL/L — SIGNIFICANT CHANGE UP (ref 3.5–5.1)
POTASSIUM BLDV-SCNC: 4.9 MMOL/L — SIGNIFICANT CHANGE UP (ref 3.5–5.1)
POTASSIUM BLDV-SCNC: 5.1 MMOL/L — SIGNIFICANT CHANGE UP (ref 3.5–5.1)
POTASSIUM SERPL-MCNC: 4.4 MMOL/L — SIGNIFICANT CHANGE UP (ref 3.5–5.3)
POTASSIUM SERPL-MCNC: 4.5 MMOL/L — SIGNIFICANT CHANGE UP (ref 3.5–5.3)
POTASSIUM SERPL-MCNC: 8.2 MMOL/L — CRITICAL HIGH (ref 3.5–5.3)
POTASSIUM SERPL-SCNC: 4.4 MMOL/L — SIGNIFICANT CHANGE UP (ref 3.5–5.3)
POTASSIUM SERPL-SCNC: 4.5 MMOL/L — SIGNIFICANT CHANGE UP (ref 3.5–5.3)
POTASSIUM SERPL-SCNC: 8.2 MMOL/L — CRITICAL HIGH (ref 3.5–5.3)
PROT SERPL-MCNC: 6.3 G/DL — SIGNIFICANT CHANGE UP (ref 6–8.3)
PROT SERPL-MCNC: 6.8 G/DL — SIGNIFICANT CHANGE UP (ref 6–8.3)
PROT SERPL-MCNC: 7.1 G/DL — SIGNIFICANT CHANGE UP (ref 6–8.3)
PROTHROM AB SERPL-ACNC: 13.8 SEC — HIGH (ref 10.5–13.4)
PROTHROM AB SERPL-ACNC: 14.1 SEC — HIGH (ref 10.5–13.4)
RBC # BLD: 5.04 M/UL — SIGNIFICANT CHANGE UP (ref 4.2–5.8)
RBC # BLD: 5.05 M/UL — SIGNIFICANT CHANGE UP (ref 4.2–5.8)
RBC # FLD: 15.7 % — HIGH (ref 10.3–14.5)
RBC # FLD: 16.1 % — HIGH (ref 10.3–14.5)
RH IG SCN BLD-IMP: POSITIVE — SIGNIFICANT CHANGE UP
RH IG SCN BLD-IMP: POSITIVE — SIGNIFICANT CHANGE UP
SAO2 % BLDV: 35.3 % — LOW (ref 67–88)
SAO2 % BLDV: 36.9 % — LOW (ref 67–88)
SAO2 % BLDV: 43.3 % — LOW (ref 67–88)
SARS-COV-2 RNA SPEC QL NAA+PROBE: NEGATIVE — SIGNIFICANT CHANGE UP
SODIUM SERPL-SCNC: 127 MMOL/L — LOW (ref 135–145)
SODIUM SERPL-SCNC: 132 MMOL/L — LOW (ref 135–145)
SODIUM SERPL-SCNC: 132 MMOL/L — LOW (ref 135–145)
TROPONIN T, HIGH SENSITIVITY RESULT: 93 NG/L — HIGH (ref 0–51)
WBC # BLD: 11.27 K/UL — HIGH (ref 3.8–10.5)
WBC # BLD: 9.6 K/UL — SIGNIFICANT CHANGE UP (ref 3.8–10.5)
WBC # FLD AUTO: 11.27 K/UL — HIGH (ref 3.8–10.5)
WBC # FLD AUTO: 9.6 K/UL — SIGNIFICANT CHANGE UP (ref 3.8–10.5)

## 2022-05-26 PROCEDURE — 83036 HEMOGLOBIN GLYCOSYLATED A1C: CPT

## 2022-05-26 PROCEDURE — 85610 PROTHROMBIN TIME: CPT

## 2022-05-26 PROCEDURE — 36415 COLL VENOUS BLD VENIPUNCTURE: CPT

## 2022-05-26 PROCEDURE — C1887: CPT

## 2022-05-26 PROCEDURE — C1769: CPT

## 2022-05-26 PROCEDURE — 99152 MOD SED SAME PHYS/QHP 5/>YRS: CPT

## 2022-05-26 PROCEDURE — C1766: CPT

## 2022-05-26 PROCEDURE — 86900 BLOOD TYPING SEROLOGIC ABO: CPT

## 2022-05-26 PROCEDURE — 93321 DOPPLER ECHO F-UP/LMTD STD: CPT

## 2022-05-26 PROCEDURE — 83880 ASSAY OF NATRIURETIC PEPTIDE: CPT

## 2022-05-26 PROCEDURE — 85730 THROMBOPLASTIN TIME PARTIAL: CPT

## 2022-05-26 PROCEDURE — 84100 ASSAY OF PHOSPHORUS: CPT

## 2022-05-26 PROCEDURE — 84550 ASSAY OF BLOOD/URIC ACID: CPT

## 2022-05-26 PROCEDURE — 71045 X-RAY EXAM CHEST 1 VIEW: CPT

## 2022-05-26 PROCEDURE — 84443 ASSAY THYROID STIM HORMONE: CPT

## 2022-05-26 PROCEDURE — 71045 X-RAY EXAM CHEST 1 VIEW: CPT | Mod: 26

## 2022-05-26 PROCEDURE — 93005 ELECTROCARDIOGRAM TRACING: CPT

## 2022-05-26 PROCEDURE — 99291 CRITICAL CARE FIRST HOUR: CPT

## 2022-05-26 PROCEDURE — C1759: CPT

## 2022-05-26 PROCEDURE — 82947 ASSAY GLUCOSE BLOOD QUANT: CPT

## 2022-05-26 PROCEDURE — 99291 CRITICAL CARE FIRST HOUR: CPT | Mod: 25

## 2022-05-26 PROCEDURE — C8929: CPT

## 2022-05-26 PROCEDURE — 80048 BASIC METABOLIC PNL TOTAL CA: CPT

## 2022-05-26 PROCEDURE — 84132 ASSAY OF SERUM POTASSIUM: CPT

## 2022-05-26 PROCEDURE — C1889: CPT

## 2022-05-26 PROCEDURE — 86850 RBC ANTIBODY SCREEN: CPT

## 2022-05-26 PROCEDURE — 80053 COMPREHEN METABOLIC PANEL: CPT

## 2022-05-26 PROCEDURE — 87635 SARS-COV-2 COVID-19 AMP PRB: CPT

## 2022-05-26 PROCEDURE — 85014 HEMATOCRIT: CPT

## 2022-05-26 PROCEDURE — 84484 ASSAY OF TROPONIN QUANT: CPT

## 2022-05-26 PROCEDURE — 82962 GLUCOSE BLOOD TEST: CPT

## 2022-05-26 PROCEDURE — 83735 ASSAY OF MAGNESIUM: CPT

## 2022-05-26 PROCEDURE — C1730: CPT

## 2022-05-26 PROCEDURE — 82803 BLOOD GASES ANY COMBINATION: CPT

## 2022-05-26 PROCEDURE — 85027 COMPLETE CBC AUTOMATED: CPT

## 2022-05-26 PROCEDURE — 99285 EMERGENCY DEPT VISIT HI MDM: CPT

## 2022-05-26 PROCEDURE — 82553 CREATINE MB FRACTION: CPT

## 2022-05-26 PROCEDURE — 82550 ASSAY OF CK (CPK): CPT

## 2022-05-26 PROCEDURE — C1894: CPT

## 2022-05-26 PROCEDURE — 93451 RIGHT HEART CATH: CPT | Mod: 26

## 2022-05-26 PROCEDURE — 82330 ASSAY OF CALCIUM: CPT

## 2022-05-26 PROCEDURE — 85379 FIBRIN DEGRADATION QUANT: CPT

## 2022-05-26 PROCEDURE — 86901 BLOOD TYPING SEROLOGIC RH(D): CPT

## 2022-05-26 PROCEDURE — 71045 X-RAY EXAM CHEST 1 VIEW: CPT | Mod: 26,77,76

## 2022-05-26 PROCEDURE — 83605 ASSAY OF LACTIC ACID: CPT

## 2022-05-26 PROCEDURE — 85025 COMPLETE CBC W/AUTO DIFF WBC: CPT

## 2022-05-26 PROCEDURE — C1760: CPT

## 2022-05-26 PROCEDURE — 84295 ASSAY OF SERUM SODIUM: CPT

## 2022-05-26 PROCEDURE — 97161 PT EVAL LOW COMPLEX 20 MIN: CPT

## 2022-05-26 PROCEDURE — 80061 LIPID PANEL: CPT

## 2022-05-26 RX ORDER — ASPIRIN/CALCIUM CARB/MAGNESIUM 324 MG
81 TABLET ORAL DAILY
Refills: 0 | Status: DISCONTINUED | OUTPATIENT
Start: 2022-05-26 | End: 2022-06-21

## 2022-05-26 RX ORDER — AMIODARONE HYDROCHLORIDE 400 MG/1
2 TABLET ORAL
Qty: 0 | Refills: 0 | DISCHARGE
Start: 2022-05-26

## 2022-05-26 RX ORDER — SENNA PLUS 8.6 MG/1
2 TABLET ORAL AT BEDTIME
Refills: 0 | Status: DISCONTINUED | OUTPATIENT
Start: 2022-05-26 | End: 2022-06-07

## 2022-05-26 RX ORDER — METOPROLOL TARTRATE 50 MG
1 TABLET ORAL
Qty: 0 | Refills: 0 | DISCHARGE
Start: 2022-05-26

## 2022-05-26 RX ORDER — ATORVASTATIN CALCIUM 80 MG/1
1 TABLET, FILM COATED ORAL
Qty: 0 | Refills: 0 | DISCHARGE
Start: 2022-05-26

## 2022-05-26 RX ORDER — ACETAMINOPHEN 500 MG
975 TABLET ORAL ONCE
Refills: 0 | Status: COMPLETED | OUTPATIENT
Start: 2022-05-26 | End: 2022-05-26

## 2022-05-26 RX ORDER — SPIRONOLACTONE 25 MG/1
1 TABLET, FILM COATED ORAL
Qty: 0 | Refills: 0 | DISCHARGE
Start: 2022-05-26

## 2022-05-26 RX ORDER — ALLOPURINOL 300 MG
75 TABLET ORAL
Qty: 0 | Refills: 0 | DISCHARGE
Start: 2022-05-26

## 2022-05-26 RX ORDER — CLOPIDOGREL BISULFATE 75 MG/1
75 TABLET, FILM COATED ORAL DAILY
Refills: 0 | Status: DISCONTINUED | OUTPATIENT
Start: 2022-05-27 | End: 2022-05-28

## 2022-05-26 RX ORDER — PANTOPRAZOLE SODIUM 20 MG/1
40 TABLET, DELAYED RELEASE ORAL
Refills: 0 | Status: DISCONTINUED | OUTPATIENT
Start: 2022-05-26 | End: 2022-06-21

## 2022-05-26 RX ORDER — CHLORHEXIDINE GLUCONATE 213 G/1000ML
1 SOLUTION TOPICAL
Refills: 0 | Status: DISCONTINUED | OUTPATIENT
Start: 2022-05-26 | End: 2022-05-26

## 2022-05-26 RX ORDER — POLYETHYLENE GLYCOL 3350 17 G/17G
17 POWDER, FOR SOLUTION ORAL DAILY
Refills: 0 | Status: DISCONTINUED | OUTPATIENT
Start: 2022-05-26 | End: 2022-05-28

## 2022-05-26 RX ORDER — SODIUM CHLORIDE 9 MG/ML
1000 INJECTION, SOLUTION INTRAVENOUS
Refills: 0 | Status: DISCONTINUED | OUTPATIENT
Start: 2022-05-26 | End: 2022-05-28

## 2022-05-26 RX ORDER — AMIODARONE HYDROCHLORIDE 400 MG/1
400 TABLET ORAL
Refills: 0 | Status: DISCONTINUED | OUTPATIENT
Start: 2022-05-26 | End: 2022-05-27

## 2022-05-26 RX ORDER — METOPROLOL TARTRATE 50 MG
25 TABLET ORAL DAILY
Refills: 0 | Status: DISCONTINUED | OUTPATIENT
Start: 2022-05-26 | End: 2022-05-27

## 2022-05-26 RX ORDER — DEXTROSE 50 % IN WATER 50 %
15 SYRINGE (ML) INTRAVENOUS ONCE
Refills: 0 | Status: DISCONTINUED | OUTPATIENT
Start: 2022-05-26 | End: 2022-05-28

## 2022-05-26 RX ORDER — DEXTROSE 50 % IN WATER 50 %
25 SYRINGE (ML) INTRAVENOUS ONCE
Refills: 0 | Status: DISCONTINUED | OUTPATIENT
Start: 2022-05-26 | End: 2022-05-28

## 2022-05-26 RX ORDER — INSULIN LISPRO 100/ML
VIAL (ML) SUBCUTANEOUS
Refills: 0 | Status: DISCONTINUED | OUTPATIENT
Start: 2022-05-26 | End: 2022-06-05

## 2022-05-26 RX ORDER — INFLUENZA VIRUS VACCINE 15; 15; 15; 15 UG/.5ML; UG/.5ML; UG/.5ML; UG/.5ML
0.5 SUSPENSION INTRAMUSCULAR ONCE
Refills: 0 | Status: COMPLETED | OUTPATIENT
Start: 2022-05-26 | End: 2022-06-10

## 2022-05-26 RX ORDER — CHLORHEXIDINE GLUCONATE 213 G/1000ML
1 SOLUTION TOPICAL
Refills: 0 | Status: DISCONTINUED | OUTPATIENT
Start: 2022-05-26 | End: 2022-06-21

## 2022-05-26 RX ORDER — DEXTROSE 50 % IN WATER 50 %
12.5 SYRINGE (ML) INTRAVENOUS ONCE
Refills: 0 | Status: DISCONTINUED | OUTPATIENT
Start: 2022-05-26 | End: 2022-05-28

## 2022-05-26 RX ORDER — GLUCAGON INJECTION, SOLUTION 0.5 MG/.1ML
1 INJECTION, SOLUTION SUBCUTANEOUS ONCE
Refills: 0 | Status: DISCONTINUED | OUTPATIENT
Start: 2022-05-26 | End: 2022-05-28

## 2022-05-26 RX ORDER — PANTOPRAZOLE SODIUM 20 MG/1
1 TABLET, DELAYED RELEASE ORAL
Qty: 0 | Refills: 0 | DISCHARGE
Start: 2022-05-26

## 2022-05-26 RX ORDER — INSULIN LISPRO 100/ML
VIAL (ML) SUBCUTANEOUS AT BEDTIME
Refills: 0 | Status: DISCONTINUED | OUTPATIENT
Start: 2022-05-26 | End: 2022-06-21

## 2022-05-26 RX ORDER — ALLOPURINOL 300 MG
75 TABLET ORAL DAILY
Refills: 0 | Status: ACTIVE | OUTPATIENT
Start: 2022-05-27 | End: 2023-04-25

## 2022-05-26 RX ORDER — MILRINONE LACTATE 1 MG/ML
0.5 INJECTION, SOLUTION INTRAVENOUS
Qty: 20 | Refills: 0 | Status: DISCONTINUED | OUTPATIENT
Start: 2022-05-26 | End: 2022-06-21

## 2022-05-26 RX ORDER — SPIRONOLACTONE 25 MG/1
25 TABLET, FILM COATED ORAL DAILY
Refills: 0 | Status: DISCONTINUED | OUTPATIENT
Start: 2022-05-26 | End: 2022-05-27

## 2022-05-26 RX ORDER — ACETAMINOPHEN 500 MG
650 TABLET ORAL ONCE
Refills: 0 | Status: COMPLETED | OUTPATIENT
Start: 2022-05-26 | End: 2022-05-26

## 2022-05-26 RX ORDER — ATORVASTATIN CALCIUM 80 MG/1
80 TABLET, FILM COATED ORAL AT BEDTIME
Refills: 0 | Status: DISCONTINUED | OUTPATIENT
Start: 2022-05-26 | End: 2022-06-13

## 2022-05-26 RX ORDER — HEPARIN SODIUM 5000 [USP'U]/ML
5000 INJECTION INTRAVENOUS; SUBCUTANEOUS EVERY 8 HOURS
Refills: 0 | Status: DISCONTINUED | OUTPATIENT
Start: 2022-05-26 | End: 2022-06-06

## 2022-05-26 RX ADMIN — Medication 650 MILLIGRAM(S): at 16:25

## 2022-05-26 RX ADMIN — Medication 975 MILLIGRAM(S): at 06:15

## 2022-05-26 RX ADMIN — POLYETHYLENE GLYCOL 3350 17 GRAM(S): 17 POWDER, FOR SOLUTION ORAL at 21:19

## 2022-05-26 RX ADMIN — AMIODARONE HYDROCHLORIDE 400 MILLIGRAM(S): 400 TABLET ORAL at 05:13

## 2022-05-26 RX ADMIN — Medication 81 MILLIGRAM(S): at 11:04

## 2022-05-26 RX ADMIN — Medication 75 MILLIGRAM(S): at 13:28

## 2022-05-26 RX ADMIN — PANTOPRAZOLE SODIUM 40 MILLIGRAM(S): 20 TABLET, DELAYED RELEASE ORAL at 06:30

## 2022-05-26 RX ADMIN — HEPARIN SODIUM 5000 UNIT(S): 5000 INJECTION INTRAVENOUS; SUBCUTANEOUS at 21:17

## 2022-05-26 RX ADMIN — Medication 25 MILLIGRAM(S): at 06:30

## 2022-05-26 RX ADMIN — Medication 650 MILLIGRAM(S): at 15:57

## 2022-05-26 RX ADMIN — ATORVASTATIN CALCIUM 80 MILLIGRAM(S): 80 TABLET, FILM COATED ORAL at 21:17

## 2022-05-26 RX ADMIN — CLOPIDOGREL BISULFATE 75 MILLIGRAM(S): 75 TABLET, FILM COATED ORAL at 11:04

## 2022-05-26 RX ADMIN — SPIRONOLACTONE 25 MILLIGRAM(S): 25 TABLET, FILM COATED ORAL at 05:13

## 2022-05-26 RX ADMIN — HEPARIN SODIUM 5000 UNIT(S): 5000 INJECTION INTRAVENOUS; SUBCUTANEOUS at 14:33

## 2022-05-26 RX ADMIN — HEPARIN SODIUM 5000 UNIT(S): 5000 INJECTION INTRAVENOUS; SUBCUTANEOUS at 05:13

## 2022-05-26 RX ADMIN — Medication 975 MILLIGRAM(S): at 05:15

## 2022-05-26 RX ADMIN — AMIODARONE HYDROCHLORIDE 400 MILLIGRAM(S): 400 TABLET ORAL at 21:18

## 2022-05-26 NOTE — PROGRESS NOTE ADULT - PROBLEM SELECTOR PLAN 2
As above 41 episodes of VT in setting of presyncopal symptoms, ATP'ed by device.   - Amiodarone 400 mg BID.

## 2022-05-26 NOTE — PROGRESS NOTE ADULT - PROBLEM SELECTOR PROBLEM 1
Ventricular tachycardia
Ventricular tachycardia
Cardiomyopathy, nonischemic
Ventricular tachycardia

## 2022-05-26 NOTE — PATIENT PROFILE ADULT - FALL HARM RISK - UNIVERSAL INTERVENTIONS
Bed in lowest position, wheels locked, appropriate side rails in place/Call bell, personal items and telephone in reach/Instruct patient to call for assistance before getting out of bed or chair/Non-slip footwear when patient is out of bed/[place] to call system/Physically safe environment - no spills, clutter or unnecessary equipment/Purposeful Proactive Rounding/Room/bathroom lighting operational, light cord in reach

## 2022-05-26 NOTE — PROGRESS NOTE ADULT - PROBLEM SELECTOR PROBLEM 5
Acute gout
Chronic kidney disease, unspecified CKD stage
Acute gout
Acute gout

## 2022-05-26 NOTE — PROGRESS NOTE ADULT - CRITICAL CARE ATTENDING COMMENT
This is a very pleasant, 65 yo M with DM II (A1C 6.2%), hypertension, CAD s/p prior MI (2008, 2011) with PCI mLAD in April 2022, mixed cardiomyopathy with LVEF currently 10-15% who presented to Dr. Hess with pre-syncope and found to have 40 episodes of VT with successful ATP and no shocks. He was admitted for an ischemic work-up and plan for VT ablation. LHC revealed a patent stent with slow flow. No scar was found at the time of mapping so he was started on an amiodarone load. Hemodynamics obtained by EP using groin sheath, but could not float into PA: CVP 17, LAP 33 and LVEDP 24. He received lasix 40 mg IV x2 doses in CICU and is now euvolemic. He is blood type A+.    TTE with LVIDd 5.2 cm and decreased RVF, EF 10-15%, mild AR/AS/MR. Labs notable for JAGRUTI (SCr 1.9 with baseline ~1.2-1.4), TBili 1.7 and Hgb 15.7. He diuresed about 2L post-procedure, but has been too hypotensive to resume his outpatient Entresto 24-26. He is currently on Toprol XL rather than Coreg due to hypotension. ECG is Biventricular paced.    Given the presentation was VT with severe LV and at least mild-moderate RV dysfunction, along with hypotension, I am concerned that he will not tolerate any meaningful escalation of GDMT and he should have an advanced HF therapies evaluation. RHC was done earlier today with the following info: RA 12 (v-wave 16), RV 72/12, PA 70/40, PCW 22, PA 47%, Ao 95%, CI 1.3, /70/83.    I have recommended transfer to [facility] for an expedited evaluation for LVAD/transplant and the patient and his wife are amenable. I've discussed his case with the [facility] CICU team and they have accepted him for transfer later today. The team there will initiate nipride given the hemodynamic data here and try to avoid inotropes and/or IABP, if possible, but will reassess during his admission there.

## 2022-05-26 NOTE — DISCHARGE NOTE PROVIDER - NSDCMRMEDTOKEN_GEN_ALL_CORE_FT
Aspirin Enteric Coated 81 mg oral delayed release tablet: 1 tab(s) orally once a day  clopidogrel 75 mg oral tablet: 1 tab(s) orally once a day  Coreg 12.5 mg oral tablet: 1 tab(s) orally 2 times a day  glimepiride 1 mg oral tablet: 1 tab(s) orally once a day  Lasix 40 mg oral tablet: 1 tab(s) orally once a day, As Needed  Lipitor 40 mg oral tablet: 1 tab(s) orally once a day  Vitamin D3 1250 mcg (50,000 intl units) oral capsule: 1 cap(s) orally once a week   allopurinol: 75 milligram(s) orally once a day  amiodarone 200 mg oral tablet: 2 tab(s) orally every 12 hours  Aspirin Enteric Coated 81 mg oral delayed release tablet: 1 tab(s) orally once a day  clopidogrel 75 mg oral tablet: 1 tab(s) orally once a day  glimepiride 1 mg oral tablet: 1 tab(s) orally once a day  Lasix 40 mg oral tablet: 1 tab(s) orally once a day, As Needed  Lipitor 80 mg oral tablet: 1 tab(s) orally once a day (at bedtime)  metoprolol succinate 25 mg oral tablet, extended release: 1 tab(s) orally every 12 hours  pantoprazole 40 mg oral delayed release tablet: 1 tab(s) orally once a day (before a meal)  spironolactone 25 mg oral tablet: 1 tab(s) orally once a day  Vitamin D3 1250 mcg (50,000 intl units) oral capsule: 1 cap(s) orally once a week

## 2022-05-26 NOTE — H&P ADULT - NSHPPHYSICALEXAM_GEN_ALL_CORE
PHYSICAL EXAM:  Constitutional: Patient laying in bed, NAD  Head: Atraumatic, normocephalic  Eyes: No scleral icterus. PERRLA. EOMI  ENMT: Moist mucous membrane. Uvula midline  Neck: Supple, No JVD  Respiratory: CTA B/L. No wheezes, rales or rhonchi   Cardiovascular: S1/S2. No murmurs, rubs, or gallops.  Gastrointestinal: Nondistended, BSx4, soft, nontender.  Extremities: Moves all extremities. Warm, no edema, nontender  Vascular: 2+ DP/PT pulses B/L   Neurological: A&Ox3. Follows commands. No focal deficits.   Skin: No rashes  on exposed skin

## 2022-05-26 NOTE — PROGRESS NOTE ADULT - PROBLEM SELECTOR PROBLEM 2
CAD (coronary artery disease)
Ventricular tachycardia
CAD (coronary artery disease)
CAD (coronary artery disease)

## 2022-05-26 NOTE — H&P ADULT - HISTORY OF PRESENT ILLNESS
66M Mixed Ischemic/NICM Cardiomyopathy (EF 25-30% at baseline, s/p BIV-ICD), CAD (medically managed MIs in 2008,2011, Most recent stent in April 2022 to mLAD) presented with multiple near syncope, found to have 41 episodes of VT and admitted initially to cardiac telemetry for further evaluation/management. Ischemic evaluation without new disease and VT ablation without substrate to complete ablation.   Transferred from [facility] for further management and evaluation for LVAD vs OHT.

## 2022-05-26 NOTE — PROGRESS NOTE ADULT - SUBJECTIVE AND OBJECTIVE BOX
Heart Failure Consult Note    INTERVAL EVENTS: No dizziness, feeling well, no chest pain or shortness of breath.    PAST MEDICAL & SURGICAL HISTORY:  AV block    Essential hypertension    Pure hypercholesterolemia    Myocardial infarct, old    Chronic HFrEF (heart failure with reduced ejection fraction)    Chronic kidney disease, unspecified CKD stage    CAD (coronary artery disease)    HLD (hyperlipidemia)    Type 2 diabetes mellitus    Artificial cardiac pacemaker        MEDICATIONS  (STANDING):  allopurinol 75 milliGRAM(s) Oral daily  aMIOdarone    Tablet 400 milliGRAM(s) Oral every 12 hours  aspirin enteric coated 81 milliGRAM(s) Oral daily  atorvastatin 80 milliGRAM(s) Oral at bedtime  chlorhexidine 2% Cloths 1 Application(s) Topical <User Schedule>  clopidogrel Tablet 75 milliGRAM(s) Oral daily  dextrose 5%. 1000 milliLiter(s) (50 mL/Hr) IV Continuous <Continuous>  dextrose 5%. 1000 milliLiter(s) (100 mL/Hr) IV Continuous <Continuous>  dextrose 50% Injectable 25 Gram(s) IV Push once  dextrose 50% Injectable 12.5 Gram(s) IV Push once  dextrose 50% Injectable 25 Gram(s) IV Push once  glucagon  Injectable 1 milliGRAM(s) IntraMuscular once  heparin   Injectable 5000 Unit(s) SubCutaneous every 8 hours  insulin lispro (ADMELOG) corrective regimen sliding scale   SubCutaneous Before meals and at bedtime  metoprolol succinate ER 25 milliGRAM(s) Oral every 12 hours  pantoprazole    Tablet 40 milliGRAM(s) Oral before breakfast  spironolactone 25 milliGRAM(s) Oral daily    MEDICATIONS  (PRN):  dextrose Oral Gel 15 Gram(s) Oral once PRN Blood Glucose LESS THAN 70 milliGRAM(s)/deciliter    Vital Signs Last 24 Hrs  T(C): 36.4 (26 May 2022 09:00), Max: 36.7 (25 May 2022 18:15)  T(F): 97.6 (26 May 2022 09:00), Max: 98.1 (25 May 2022 18:15)  HR: 69 (26 May 2022 11:00) (69 - 69)  BP: 100/70 (26 May 2022 10:00) (85/59 - 100/70)  BP(mean): 80 (26 May 2022 10:00) (69 - 84)  RR: 20 (26 May 2022 11:00) (15 - 29)  SpO2: 96% (26 May 2022 11:00) (93% - 99%)    PHYSICAL EXAM:  GEN: Awake, alert. NAD.   HEENT: No JVD.  RESP: CTA b/l  CV: RRR. Normal S1/S2.  ABD: Soft. NT/ND.   EXT: Warm. No edema  NEURO: AAOx3. No focal deficits.     LABS:                        15.3   9.60  )-----------( 201      ( 26 May 2022 03:31 )             45.7     05-26    132<L>  |  98  |  44<H>  ----------------------------<  121<H>  4.4   |  23  |  1.77<H>    Ca    9.4      26 May 2022 03:31  Phos  3.2     05-26  Mg     2.1     05-26    TPro  6.8  /  Alb  3.6  /  TBili  1.7<H>  /  DBili  x   /  AST  17  /  ALT  11  /  AlkPhos  55  05-26        PT/INR - ( 26 May 2022 03:31 )   PT: 14.1 sec;   INR: 1.18          PTT - ( 26 May 2022 03:31 )  PTT:36.3 sec    I&O's Summary    25 May 2022 07:01  -  26 May 2022 07:00  --------------------------------------------------------  IN: 0 mL / OUT: 975 mL / NET: -975 mL      RADIOLOGY & ADDITIONAL STUDIES:    EKG:

## 2022-05-26 NOTE — PROGRESS NOTE ADULT - ASSESSMENT
Presented to [facility] with VT, found to be in cardiogenic shock and transferred to [facility]  A+Ox3  Cardiogenic shock, CI 1.3 - will start Milrinone  Continue Toprol and Amio PO for VT  Continue DAPT with ASA/Plavix for recent stent  O2 sats mid 90s on room air  DASH/diabetic diet  Non-oliguric JAGRUTI, CVP 10, s/p dose of Lasix prior to leaving [facility] - will trend  H/H acceptable on HSQ for DVT prophylaxis   Afebrile, no antibiotics  Sugars controlled  RIJ Cordis/Levan (placed at [facility]) 5/26 - if Levan is dysfunctional will remove Presented to [facility] with VT, found to be in cardiogenic shock and transferred to [facility]  A+Ox3  Cardiogenic shock, CI 1.3 - will start Milrinone and recheck numbers  Continue Toprol and Amio PO for VT  Continue DAPT with ASA/Plavix for recent stent  O2 sats mid 90s on room air  DASH/diabetic diet  Non-oliguric JAGRUTI, CVP 10, s/p dose of Lasix prior to leaving [facility] - will trend  H/H acceptable on HSQ for DVT prophylaxis   Afebrile, no antibiotics  Sugars controlled  RIJ Cordis/S[place]ling (placed at [facility]) 5/26 - if Sterling is dysfunctional will remove

## 2022-05-26 NOTE — H&P ADULT - NSHPLABSRESULTS_GEN_ALL_CORE
I have personally reviewed all labs and imaging.                             15.2   11.27 )-----------( 241      ( 26 May 2022 20:22 )             46.1       05-26    127<L>  |  98  |  49<H>  ----------------------------<  132<H>  8.2<HH>   |  17<L>  |  2.07<H>    Ca    8.7      26 May 2022 20:22  Phos  4.3     05-26  Mg     2.2     05-26    TPro  7.1  /  Alb  3.7  /  TBili  1.7<H>  /  DBili  x   /  AST  72<H>  /  ALT  77<H>  /  AlkPhos  42  05-26          Magnesium, Serum: 2.2 mg/dL (05-26-22 @ 20:22)  Phosphorus Level, Serum: 4.3 mg/dL (05-26-22 @ 20:22)  Magnesium, Serum: 2.1 mg/dL (05-26-22 @ 03:31)  Phosphorus Level, Serum: 3.2 mg/dL (05-26-22 @ 03:31)      ABG - ( 26 May 2022 20:15 )  pH, Arterial: 7.48  pH, Blood: x     /  pCO2: 21    /  pO2: 89    / HCO3: 16    / Base Excess: -5.4  /  SaO2: 97.6                    PT/INR - ( 26 May 2022 20:22 )   PT: 13.8 sec;   INR: 1.20 ratio         PTT - ( 26 May 2022 20:22 )  PTT:30.3 sec    Lactate Trend  05-25 @ 04:34 Lactate:1.1   05-24 @ 17:22 Lactate:1.8       CARDIAC MARKERS ( 26 May 2022 20:22 )  x     / x     / 210 U/L / x     / 2.2 ng/mL

## 2022-05-26 NOTE — PROGRESS NOTE ADULT - SUBJECTIVE AND OBJECTIVE BOX
GOCOOL, LENNOX  MRN-[id number]  Patient is a 66y old  Male who presents with a chief complaint of LVAD/OHT eval (27 May 2022 13:14)    HPI:  66M Mixed Ischemic/NICM Cardiomyopathy (EF 25-30% at baseline, s/p BIV-ICD), CAD (medically managed MIs in 2008,2011, Most recent stent in April 2022 to mLAD) presented with multiple near syncope, found to have 41 episodes of VT and admitted initially to cardiac telemetry for further evaluation/management. Ischemic evaluation without new disease and VT ablation without substrate to complete ablation.   Transferred from [facility] for further management and evaluation for LVAD vs OHT.     24 HOUR EVENTS:  Transferred to CICU at [facility] for LVAD/transplant evaluation.    REVIEW OF SYSTEMS:    CONSTITUTIONAL: No weakness, fevers or chills  EYES/ENT: No visual changes;  No vertigo or throat pain   NECK: No pain or stiffness  RESPIRATORY: No cough, wheezing, hemoptysis; No shortness of breath  CARDIOVASCULAR: No chest pain or palpitations  GASTROINTESTINAL: No abdominal or epigastric pain. No nausea, vomiting, or hematemesis; No diarrhea or constipation. No melena or hematochezia.  GENITOURINARY: No dysuria, frequency or hematuria  NEUROLOGICAL: No numbness or weakness  SKIN: No itching, rashes      PHYSICAL EXAM:  GENERAL: No acute distress, well-developed  HEAD:  Atraumatic, Normocephalic  EYES: EOMI, PERRLA, conjunctiva and sclera clear  NECK: Supple, no lymphadenopathy, no JVD  CHEST/LUNG: CTAB; No wheezes, rales, or rhonchi  HEART: Regular rate and rhythm. Normal S1/S2. No murmurs, rubs, or gallops  ABDOMEN: Soft, non-tender, non-distended; normal bowel sounds, no organomegaly  EXTREMITIES:  2+ peripheral pulses b/l, No clubbing, cyanosis, or edema  NEUROLOGY: A&O x 3, no focal deficits  SKIN: No rashes or lesions    ============================ LABS =========================                        15.3   9.60  )-----------( 201                 45.7       132  |  98  |  44  ----------------------------<  121  4.4   |  23  |  1.77    Ca    9.4        Phos  3.2       Mg     2.1      ======================Micro/Rad/Cardio=================  Telemtry: Reviewed   EKG: Reviewed  CXR: Reviewed  Culture: Reviewed   Echo:   Cath:  ======================================================  PAST MEDICAL & SURGICAL HISTORY:  AV block  Essential hypertension  Chronic HFrEF (heart failure with reduced ejection fraction)  Chronic kidney disease, unspecified CKD stage  CAD (coronary artery disease)  HLD (hyperlipidemia)  Type 2 diabetes mellitus  Artificial cardiac pacemaker

## 2022-05-26 NOTE — PROGRESS NOTE ADULT - ASSESSMENT
67 y/o male w/ PMHx HTN, HLD, type 2 DM, chronic HFrEF (EF 25-30% by Echo), complete heart block s/p PPM (in 2006, upgraded to BiV-ICD in 09/2016), CAD (s/p recent BERNABE mid LAD at [facility] on 04/18/2022), and stage II CKD (baseline Cr ~1.3) admitted for intermittent vtach, now s/p EPS but unable to perform ablation because of elevated LA pressures. Transferred to CCU    NEURO  AAOx3    CARDIO  #VTach s/p EPS on 5/24, unable to perform ablation as no substrate found and did not induce VT because of severely low EF.   Intermittent couplets/PVCs, NSVT x4, intermittently A-V paced on tele, EP following  - Interrogation of ICD @Dr Wilson's office 5/20: back-to-back episodes of VT @ 200+ bpm all terminating with ATP. Since last cath pt has had 41 VT episodes (all falling into VF zone)  - Normal device function. BIV pacing 97%. No programming changes made  Plan:  -. c/w Amiodarone 400 mg PO BID  - switched coreg to toprol XL 25mg BID       #Acute on chronic HFrEF (heart failure with reduced ejection fraction).   - ECHO 5/21/22 with LVEF 10-15% (reduced from 25-30% 3/2022)  Plan:   - Received lasix 40 IVP x2 since 5/24 arrival on CCU with 1.8L urine output   - c/w  spironolactone 25mg daily  -  Hold entresto at this time in setting of soft pressures  - Hold diuretics in setting of soft pressures and patient euvolemic   - Switched coreg to Toprol 25mg BID,  - Net negative 2.5 L since admission   - will obtain RHC on 5/26/22, after RHC will initiate transfer for transplant/LVad evaluation at [facility]  - will start farxiga on discharge     #CAD (coronary artery disease).    Chest pain free and compliant with DAPT since last cath 4/18/22  - Trop flat 0.04 x2 CK, D-dimer normal -184  - Cardiac cath @[facility] 4/18/22: mLAD 90% s/p BERNABE, LCx mild disease, RCA mild disease.  - ECHO (03/2022, per MD note): mild LV dilation, EF 25-30%, mild RV dilation, trace AI/MR.   - ECHO 5/21/22: LVEF 10-15%, abnormal septal motion seen due to RV pacing, remaining segments demonstrate severe hypokinesis, D- shaped flattening of the interventricular septum due to RV pressure overload, normal RV size, reduced RVSF, dilated RA, mild AI/AS, pulm HTN (PASP 51 mmHg), mildly dilated ascending aorta, based on TTE 2016 the LVEF is lower is RV function is now reduced.   - s/p diagnostic cardiac cath w/ Dr Wagner on 05/23/2022   Plan:  - c/w ASA 81 mg PO QD  - c/w Plavix 75 mg PO QD  - c/w Atorvastatin 80 mg QHS    #Hyperlipidemia.   - , , HDL 29, LDL 84  - Home Atorvastatin increased to 80 mg PO qHS    PULM  No Acute issues.    GI  No acute issues.    /RENAL  #JAGRUTI (acute kidney injury) CKD II  baseline Cr ~1.3  - monitor CR   - Avoid nephrotoxic agents, NSAIDs. Renally dose meds.    MSK  #Acute gout   TTP to R great toe on admit, s/p prednisone 40 mg PO x1 with resolution  Serum uric acid 14.1 5/22  Plan:   - restarted on allopurinol 75mg daily (due to CrCL-home dose 100mg).    ENDO  #T2DM  - on ISS     HEME  No acute issues    DISPO  DVT F: Oral intake  E: Replete electrolytes as needed for K<4 and Mg<2  N: DASH Diet  DVT PPX: SQ heparin  Dispo: transfer to CCU

## 2022-05-26 NOTE — DISCHARGE NOTE PROVIDER - CARE PROVIDER_API CALL
Avinash Steen)  Internal Medicine  [address] [place] [address]  Phone: [phone]  Fax: [phone]  Follow Up Time:    Avinash Steen)  Internal Medicine  [address] [place] [address]  Phone: [phone]  Fax: [phone]  Follow Up Time: 2 weeks

## 2022-05-26 NOTE — PROGRESS NOTE ADULT - ATTENDING COMMENTS
66M w/ HTN, HLD, chronic sCHF(baseline EF25-30%), CHB s/p PPM, upgraded to BI-V in 2016, CAD s/p PCI in April '22 presented to EP clinic w/ near syncope, SOB, fatigue and palpitations found to have intermittent VT. Underwent LHC w/ no new CAD, subsequently underwent EPS, however found to be significantly overloaded and tx'd to CCU for further mgmt    -sCHF - acute on chronic sCHF exacerbation, c/b low output and volume overload -> s/p Lasix 40 IV x 2 (5/24 & 5/25), currently euvolemic on exam, extremities WWP and w/ stable end-organ function, though soft BPs. Yesterday changed from Coreg 12.5 BID to Toprol 25 BID, Entresto held and otherwise on [place] 25, no standing diuretic; Now s/p RHC today: RA 13 RV 70/5 PA 69/32(46), PCWP 21, AO sat 94%, PA sat 47%, CO/CI 2.2/1.15; Despite stable end-organ function pt. will likely need inotrope, preferably Milrinone if BPs tolerate given recent VT, also received Toprol this AM. Advanced HF following, appreciate recs - Plan for inpatient transfer to [facility] for evaluation for advanced therapies  -VT - Recurrent VT as an outpatient seen on device interrogation, now s/p EPS - unable to map any endocardial substrate. VT was not induced d/t severely reduced LVEF and no ablation performed; c/w Amiodarone 400 PO BID for 10g load, Coreg switched to Toprol BID, however given low output state will likely need to be reduced if not dc'd. Of note, pt. has had no recurrent VT since being admitted  -CAD - h/o PCI of mLAD ~1month ago, given presentation w/ recurrent VT patient underwent LHC on 5/23 which revealed patent LAD stent and ostial D1 40-50% (small, unchanged since prior PCI), LCX/OM1 mild disease, RCA/RPDA mild disease - no new disease or intervention; c/w ASA/Plavix and Lipitor  -DASH diet  -DVT PPx  -Dispo: CCU - Plan for inpatient transfer to [facility]    Avinash Steen MD  CCU Attending
66M w/ HTN, HLD, chronic sCHF(EF25-30%), CHB s/p PPM, upgraded to BI-V in 2016, CAD s/p PCI in April '22 presented to EP clinic w/ near syncope, SOB, fatigue and palpitations found to have intermittent VT. Underwent LHC w/ no new CAD, subsequently underwent EPS, however found to be significantly overloaded and tx'd to CCU for further mgmt    -sCHF - acute on chronic sCHF exacerbation, found to be volume overloaded during EPS, PA catheter confirmed significantly elevated R and L sided filling pressures -> transferred to CCU for further mgmt; s/p Lasix 40 IV x 2 w/ ~2L UOP since yesterday evening, currently in NAD saturating High 90s on RA, clinically near euvolemic on exam, otherwise WWP. This AM was hypotensive w/ MAPs in high 50s and with lightheadedness - likely 2/2 diuresis. He did receive his Coreg 12.5 BID, Spironolactone 25 in addition to Lasix 40 IV x 1 this am, Entresto was held. Has since improved throughout the day. MAPs currently in low 70, pt. tolerating OOB to chair, denies lightheadedness. Plan to restart Entresto 24/26 BID within 24hrs.  -VT - Recurrent VT as an outpatient seen on device interrogation, now s/p EPS - unable to MAP any endocardial substrate. VT was not induced d/t severely reduced LVEF and no ablations performed. Will f/u w/ EP for further recs; c/w Amiodarone 400 PO BID, c/w Coreg  -CAD - h/o PCI of mLAD ~1month ago, given presentation w/ recurrent VT patient underwent LHC on 5/23 found to have patent LAD stent and ostial D1 40-50% (small, unchanged since prior PCI), LCX/OM1 mild disease, RCA/RPDA mild disease - no new disease or intervention; c/w ASA/Plavix and Lipitor  -DASH diet  -OOB to chair  -DVT PPx  -Dispo: CCU    Avinash Steen MD  CCU Attending

## 2022-05-26 NOTE — PROGRESS NOTE ADULT - PROBLEM SELECTOR PLAN 3
S/p LAD stent, patent on most recent cath and nonobstructive disease in other vessels.   - ASA/plavix  - High intensity statin, Lipitor 80.

## 2022-05-26 NOTE — PROGRESS NOTE ADULT - SUBJECTIVE AND OBJECTIVE BOX
GOCOOL, LENNOX, 66y, Male  MRN-[id number]  Patient is a 66y old  Male who presents with a chief complaint of Pre-syncope/VT (25 May 2022 09:07)      OVERNIGHT EVENTS:     SUBJECTIVE:    12 Point ROS Negative unless noted otherwise above.  -------------------------------------------------------------------------------  VITAL SIGNS:  Vital Signs Last 24 Hrs  T(C): 36.5 (26 May 2022 06:01), Max: 36.7 (25 May 2022 18:15)  T(F): 97.7 (26 May 2022 06:01), Max: 98.1 (25 May 2022 18:15)  HR: 69 (26 May 2022 08:00) (69 - 69)  BP: 89/62 (26 May 2022 07:00) (85/59 - 96/74)  BP(mean): 70 (26 May 2022 07:00) (69 - 82)  RR: 17 (26 May 2022 08:00) (15 - 29)  SpO2: 95% (26 May 2022 08:00) (92% - 99%)  I&O's Summary    25 May 2022 07:01  -  26 May 2022 07:00  --------------------------------------------------------  IN: 0 mL / OUT: 975 mL / NET: -975 mL        PHYSICAL EXAM:    General: NAD, well developed  HEENT: NC/AT; EOMI, PERRLA, anicteric sclera; moist mucosal membranes.  Neck: supple, trachea midline  Cardiovascular: RRR, +S1/S2; NO M/R/G  Respiratory: CTA B/L; no W/R/R  Gastrointestinal: soft, NT/ND; +BSx4  Extremities: WWP; no edema or cyanosis  Vascular: 2+ radial, DP/PT pulses B/L  Neurological: AAOx3; no focal deficits    ALLERGIES:  Allergies    No Known Allergies    Intolerances        MEDICATIONS:  MEDICATIONS  (STANDING):  allopurinol 75 milliGRAM(s) Oral daily  aMIOdarone    Tablet 400 milliGRAM(s) Oral every 12 hours  aspirin enteric coated 81 milliGRAM(s) Oral daily  atorvastatin 80 milliGRAM(s) Oral at bedtime  clopidogrel Tablet 75 milliGRAM(s) Oral daily  dextrose 5%. 1000 milliLiter(s) (50 mL/Hr) IV Continuous <Continuous>  dextrose 5%. 1000 milliLiter(s) (100 mL/Hr) IV Continuous <Continuous>  dextrose 50% Injectable 25 Gram(s) IV Push once  dextrose 50% Injectable 12.5 Gram(s) IV Push once  dextrose 50% Injectable 25 Gram(s) IV Push once  glucagon  Injectable 1 milliGRAM(s) IntraMuscular once  heparin   Injectable 5000 Unit(s) SubCutaneous every 8 hours  insulin lispro (ADMELOG) corrective regimen sliding scale   SubCutaneous Before meals and at bedtime  metoprolol succinate ER 25 milliGRAM(s) Oral every 12 hours  pantoprazole    Tablet 40 milliGRAM(s) Oral before breakfast  spironolactone 25 milliGRAM(s) Oral daily    MEDICATIONS  (PRN):  dextrose Oral Gel 15 Gram(s) Oral once PRN Blood Glucose LESS THAN 70 milliGRAM(s)/deciliter      -------------------------------------------------------------------------------  LABS:                        15.3   9.60  )-----------( 201      ( 26 May 2022 03:31 )             45.7     05-26    132<L>  |  98  |  44<H>  ----------------------------<  121<H>  4.4   |  23  |  1.77<H>    Ca    9.4      26 May 2022 03:31  Phos  3.2     05-26  Mg     2.1     05-26    TPro  6.8  /  Alb  3.6  /  TBili  1.7<H>  /  DBili  x   /  AST  17  /  ALT  11  /  AlkPhos  55  05-26    LIVER FUNCTIONS - ( 26 May 2022 03:31 )  Alb: 3.6 g/dL / Pro: 6.8 g/dL / ALK PHOS: 55 U/L / ALT: 11 U/L / AST: 17 U/L / GGT: x           PT/INR - ( 26 May 2022 03:31 )   PT: 14.1 sec;   INR: 1.18          PTT - ( 26 May 2022 03:31 )  PTT:36.3 sec    CAPILLARY BLOOD GLUCOSE      POCT Blood Glucose.: 118 mg/dL (26 May 2022 06:33)      COVID-19 PCR: Negative (20 May 2022 14:17)      RADIOLOGY & ADDITIONAL TESTS: Reviewed.       GOCOOL, LENNOX, 66y, Male  [id number]  Patient is a 66y old  Male who presents with a chief complaint of Pre-syncope/VT (25 May 2022 09:07)      OVERNIGHT EVENTS: naeo    SUBJECTIVE: Patient seen and examined at bedside. Reports feeling fine. Denies fevers, chills, HA, chest pain, sob, nausea, vomiting, abdominal pain, diarrhea, constipation, dysuria.     12 Point ROS Negative unless noted otherwise above.  -------------------------------------------------------------------------------  VITAL SIGNS:  Vital Signs Last 24 Hrs  T(C): 36.5 (26 May 2022 06:01), Max: 36.7 (25 May 2022 18:15)  T(F): 97.7 (26 May 2022 06:01), Max: 98.1 (25 May 2022 18:15)  HR: 69 (26 May 2022 08:00) (69 - 69)  BP: 89/62 (26 May 2022 07:00) (85/59 - 96/74)  BP(mean): 70 (26 May 2022 07:00) (69 - 82)  RR: 17 (26 May 2022 08:00) (15 - 29)  SpO2: 95% (26 May 2022 08:00) (92% - 99%)  I&O's Summary    25 May 2022 07:01  -  26 May 2022 07:00  --------------------------------------------------------  IN: 0 mL / OUT: 975 mL / NET: -975 mL        PHYSICAL EXAM:    General: NAD, well developed  HEENT: NC/AT; EOMI, PERRLA, anicteric sclera; dry mucosal membranes.  Neck: supple, trachea midline  Cardiovascular: RRR, +S1/S2; NO M/R/G  Respiratory: CTA B/L; no W/R/R  Gastrointestinal: soft, NT/ND; +BSx4  Extremities: WWP; no edema or cyanosis  Vascular: 2+ radial, DP/PT pulses B/L  Neurological: AAOx3; no focal deficits    ALLERGIES:  Allergies    No Known Allergies    Intolerances        MEDICATIONS:  MEDICATIONS  (STANDING):  allopurinol 75 milliGRAM(s) Oral daily  aMIOdarone    Tablet 400 milliGRAM(s) Oral every 12 hours  aspirin enteric coated 81 milliGRAM(s) Oral daily  atorvastatin 80 milliGRAM(s) Oral at bedtime  clopidogrel Tablet 75 milliGRAM(s) Oral daily  dextrose 5%. 1000 milliLiter(s) (50 mL/Hr) IV Continuous <Continuous>  dextrose 5%. 1000 milliLiter(s) (100 mL/Hr) IV Continuous <Continuous>  dextrose 50% Injectable 25 Gram(s) IV Push once  dextrose 50% Injectable 12.5 Gram(s) IV Push once  dextrose 50% Injectable 25 Gram(s) IV Push once  glucagon  Injectable 1 milliGRAM(s) IntraMuscular once  heparin   Injectable 5000 Unit(s) SubCutaneous every 8 hours  insulin lispro (ADMELOG) corrective regimen sliding scale   SubCutaneous Before meals and at bedtime  metoprolol succinate ER 25 milliGRAM(s) Oral every 12 hours  pantoprazole    Tablet 40 milliGRAM(s) Oral before breakfast  spironolactone 25 milliGRAM(s) Oral daily    MEDICATIONS  (PRN):  dextrose Oral Gel 15 Gram(s) Oral once PRN Blood Glucose LESS THAN 70 milliGRAM(s)/deciliter      -------------------------------------------------------------------------------  LABS:                        15.3   9.60  )-----------( 201      ( 26 May 2022 03:31 )             45.7     05-26    132<L>  |  98  |  44<H>  ----------------------------<  121<H>  4.4   |  23  |  1.77<H>    Ca    9.4      26 May 2022 03:31  Phos  3.2     05-26  Mg     2.1     05-26    TPro  6.8  /  Alb  3.6  /  TBili  1.7<H>  /  DBili  x   /  AST  17  /  ALT  11  /  AlkPhos  55  05-26    LIVER FUNCTIONS - ( 26 May 2022 03:31 )  Alb: 3.6 g/dL / Pro: 6.8 g/dL / ALK PHOS: 55 U/L / ALT: 11 U/L / AST: 17 U/L / GGT: x           PT/INR - ( 26 May 2022 03:31 )   PT: 14.1 sec;   INR: 1.18          PTT - ( 26 May 2022 03:31 )  PTT:36.3 sec    CAPILLARY BLOOD GLUCOSE      POCT Blood Glucose.: 118 mg/dL (26 May 2022 06:33)      COVID-19 PCR: Negative (20 May 2022 14:17)      RADIOLOGY & ADDITIONAL TESTS: Reviewed.

## 2022-05-26 NOTE — H&P ADULT - ASSESSMENT
Assessment and Plan    67 y/o male w/ PMHx HTN, HLD, type 2 DM, chronic HFrEF (EF 25-30% by Echo), complete heart block s/p PPM (in 2006, upgraded to BiV-ICD in 09/2016), CAD (s/p recent BERNABE mid LAD at [facility] on 04/18/2022), and stage II CKD (baseline Cr ~1.3) admitted for intermittent vtach, now s/p unsuccessful VT ablation. Transferred to [facility] CICU for LVAD/OHT eval.     Plan  Neuro    Assessment and Plan    67 y/o male w/ PMHx HTN, HLD, type 2 DM, chronic HFrEF (EF 25-30% by Echo), complete heart block s/p PPM (in 2006, upgraded to BiV-ICD in 09/2016), CAD (s/p recent BERNABE mid LAD at [facility] on 04/18/2022), and stage II CKD (baseline Cr ~1.3) admitted for intermittent vtach, now s/p unsuccessful VT ablation. Transferred to [facility] CICU for LVAD/OHT eval.     Plan  Neuro   - AAOx3  - No active issues    Respiratory  - No active issues  - comfortable on room air    Cardiovascular  #Cardiogenic shock       #VTach s/p EPS on 5/24, unable to perform ablation as no substrate found and did not induce VT because of severely low EF   - Interrogation of ICD @Dr Wilson's office 5/20: back-to-back episodes of VT @ 200+ bpm all terminating with ATP. Since last cath pt has had 41 VT episodes (all falling into VF zone)  - Normal device function. BIV pacing 97%. No programming changes made  -. c/w Amiodarone 400 mg PO BID load  - switched coreg to toprol XL 25mg BID       #Acute on chronic HFrEF (heart failure with reduced ejection fraction).   - ECHO 5/21/22 with LVEF 10-15% (reduced from 25-30% 3/2022)  Plan:   - Received lasix 40 IVP x2 since 5/24 arrival on CCU with 1.8L urine output   - c/w  spironolactone 25mg daily  -  Hold entresto at this time in setting of soft pressures  - Hold diuretics in setting of soft pressures and patient euvolemic   - Switched coreg to Toprol 25mg BID,  - Net negative 2.5 L since admission   - will obtain RHC on 5/26/22, after RHC will initiate transfer for transplant/LVad evaluation at [facility]  - will start farxiga on discharge     #CAD (coronary artery disease).    Chest pain free and compliant with DAPT since last cath 4/18/22  - Trop flat 0.04 x2 CK, D-dimer normal -184  - Cardiac cath @[facility] 4/18/22: mLAD 90% s/p BERNABE, LCx mild disease, RCA mild disease.  - ECHO (03/2022, per MD note): mild LV dilation, EF 25-30%, mild RV dilation, trace AI/MR.   - ECHO 5/21/22: LVEF 10-15%, abnormal septal motion seen due to RV pacing, remaining segments demonstrate severe hypokinesis, D- shaped flattening of the interventricular septum due to RV pressure overload, normal RV size, reduced RVSF, dilated RA, mild AI/AS, pulm HTN (PASP 51 mmHg), mildly dilated ascending aorta, based on TTE 2016 the LVEF is lower is RV function is now reduced.   - s/p diagnostic cardiac cath w/ Dr Wagner on 05/23/2022   Plan:  - c/w ASA 81 mg PO QD  - c/w Plavix 75 mg PO QD  - c/w Atorvastatin 80 mg QHS    #Hyperlipidemia.   - , , HDL 29, LDL 84  - Home Atorvastatin increased to 80 mg PO qHS     Assessment and Plan    65 y/o male w/ PMHx HTN, HLD, type 2 DM, chronic HFrEF (EF 25-30% by Echo), complete heart block s/p PPM (in 2006, upgraded to BiV-ICD in 09/2016), CAD (s/p recent BERNABE mid LAD at [facility] on 04/18/2022), and stage II CKD (baseline Cr ~1.3) admitted for intermittent vtach, now s/p unsuccessful VT ablation. Transferred to [facility] CICU for LVAD/OHT eval.     Plan  Neuro   - AAOx3  - No active issues    Respiratory  - No active issues  - comfortable on room air    Cardiovascular  #Cardiogenic shock   - central sat 35%, started on milrinone .125  - f/u labs c    #VTach s/p EPS on 5/24, unable to perform ablation as no substrate found and did not induce VT because of severely low EF   - Interrogation of ICD @Dr Wilson's office 5/20: back-to-back episodes of VT @ 200+ bpm all terminating with ATP. Since last cath pt has had 41 VT episodes (all falling into VF zone)  - Normal device function. BIV pacing 97%. No programming changes made  -. c/w Amiodarone 400 mg PO BID load  - switched coreg to toprol XL 25mg BID       #Acute on chronic HFrEF (heart failure with reduced ejection fraction).   - ECHO 5/21/22 with LVEF 10-15% (reduced from 25-30% 3/2022)  Plan:   - Received lasix 40 IVP x2 since 5/24 arrival on CCU with 1.8L urine output   - c/w  spironolactone 25mg daily  -  Hold entresto at this time in setting of soft pressures  - Hold diuretics in setting of soft pressures and patient euvolemic   - Switched coreg to Toprol 25mg BID,  - Net negative 2.5 L since admission   - will obtain RHC on 5/26/22, after RHC will initiate transfer for transplant/LVad evaluation at [facility]  - will start farxiga on discharge     #CAD (coronary artery disease).    Chest pain free and compliant with DAPT since last cath 4/18/22  - Trop flat 0.04 x2 CK, D-dimer normal -184  - Cardiac cath @[facility] 4/18/22: mLAD 90% s/p BERNABE, LCx mild disease, RCA mild disease.  - ECHO (03/2022, per MD note): mild LV dilation, EF 25-30%, mild RV dilation, trace AI/MR.   - ECHO 5/21/22: LVEF 10-15%, abnormal septal motion seen due to RV pacing, remaining segments demonstrate severe hypokinesis, D- shaped flattening of the interventricular septum due to RV pressure overload, normal RV size, reduced RVSF, dilated RA, mild AI/AS, pulm HTN (PASP 51 mmHg), mildly dilated ascending aorta, based on TTE 2016 the LVEF is lower is RV function is now reduced.   - s/p diagnostic cardiac cath w/ Dr Wagner on 05/23/2022   Plan:  - c/w ASA 81 mg PO QD  - c/w Plavix 75 mg PO QD  - c/w Atorvastatin 80 mg QHS    #Hyperlipidemia.   - , , HDL 29, LDL 84  - Home Atorvastatin increased to 80 mg PO qHS     Assessment and Plan    67 y/o male w/ PMHx HTN, HLD, type 2 DM, chronic HFrEF (EF 25-30% by Echo), complete heart block s/p PPM (in 2006, upgraded to BiV-ICD in 09/2016), CAD (s/p recent BERNABE mid LAD at [facility] on 04/18/2022), and stage II CKD (baseline Cr ~1.3) admitted for intermittent vtach, now s/p unsuccessful VT ablation. Transferred to [facility] CICU for LVAD/OHT eval.     Plan  Neuro   - AAOx3  - No active issues    Respiratory  - No active issues  - comfortable on room air    Cardiovascular  #Cardiogenic shock   - central sat 35%, started on milrinone .125  - f/u labs c    #VTach s/p EPS on 5/24, unable to perform ablation as no substrate found and did not induce VT because of severely low EF   - Interrogation of ICD @Dr iWlson's office 5/20: back-to-back episodes of VT @ 200+ bpm all terminating with ATP. Since last cath pt has had 41 VT episodes (all falling into VF zone)  - Normal device function. BIV pacing 97%. No programming changes made  -. c/w Amiodarone 400 mg PO BID load  - switched coreg to toprol XL 25mg BID       #Acute on chronic HFrEF (heart failure with reduced ejection fraction).   - ECHO 5/21/22 with LVEF 10-15% (reduced from 25-30% 3/2022)  - Received lasix 40 IVP x2 since 5/24 arrival on CCU with 1.8L urine output   - c/w  spironolactone 25mg daily  -  Hold entresto at this time in setting of soft pressures  - Hold diuretics in setting of soft pressures and patient euvolemic   - Switched coreg to Toprol 25mg BID,  - Net negative 2.5 L since admission   - will obtain RHC on 5/26/22, after RHC will initiate transfer for transplant/LVad evaluation at [facility]  - will start farxiga on discharge     #CAD (coronary artery disease).    Chest pain free and compliant with DAPT since last cath 4/18/22  - Trop flat 0.04 x2 CK, D-dimer normal -184  - Cardiac cath @[facility] 4/18/22: mLAD 90% s/p BERNABE, LCx mild disease, RCA mild disease.  - ECHO (03/2022, per MD note): mild LV dilation, EF 25-30%, mild RV dilation, trace AI/MR.   - ECHO 5/21/22: LVEF 10-15%, abnormal septal motion seen due to RV pacing, remaining segments demonstrate severe hypokinesis, D- shaped flattening of the interventricular septum due to RV pressure overload, normal RV size, reduced RVSF, dilated RA, mild AI/AS, pulm HTN (PASP 51 mmHg), mildly dilated ascending aorta, based on TTE 2016 the LVEF is lower is RV function is now reduced.   - s/p diagnostic cardiac cath w/ Dr Wagner on 05/23/2022   Plan:  - c/w ASA 81 mg PO QD  - c/w Plavix 75 mg PO QD  - c/w Atorvastatin 80 mg QHS    #Hyperlipidemia.   - , , HDL 29, LDL 84  - Home Atorvastatin increased to 80 mg PO qHS     Assessment and Plan    65 y/o male w/ PMHx HTN, HLD, type 2 DM, chronic HFrEF (EF 25-30% by Echo), complete heart block s/p PPM (in 2006, upgraded to BiV-ICD in 09/2016), CAD (s/p recent BERNABE mid LAD at [facility] on 04/18/2022), and stage II CKD (baseline Cr ~1.3) admitted for intermittent vtach, now s/p unsuccessful VT ablation. Transferred to [facility] CICU for LVAD/OHT eval.     Plan  Neuro   - AAOx3  - No active issues    Respiratory  - No active issues  - comfortable on room air    Cardiovascular  #Cardiogenic shock   - central sat 35%, started on milrinone .125  - repeat labs: central sat 43%--> increased milrinone 0.25  - repeat labs: central sat 65.5%, CO 4.0, CI 2.1   - discussed with HF and Dr Leonardo       #VTach s/p EPS on 5/24, unable to perform ablation as no substrate found and did not induce VT because of severely low EF   - Interrogation of ICD @Dr Wilson's office 5/20: back-to-back episodes of VT @ 200+ bpm all terminating with ATP. Since last cath pt has had 41 VT episodes (all falling into VF zone)  - Normal device function. BIV pacing 97%. No programming changes made  -. c/w Amiodarone 400 mg PO BID load  - switched coreg to toprol XL 25mg BID       #Acute on chronic HFrEF (heart failure with reduced ejection fraction).   - ECHO 5/21/22 with LVEF 10-15% (reduced from 25-30% 3/2022)  - Received lasix 40 IVP x2 since 5/24 arrival on CCU with 1.8L urine output   - c/w  spironolactone 25mg daily  - Hold entresto at this time in setting of soft pressures  - Hold diuretics in setting of soft pressures and patient euvolemic   - will start farxiga on discharge     #CAD (coronary artery disease).    Chest pain free and compliant with DAPT since last cath 4/18/22  - Trop flat 0.04 x2 CK, D-dimer normal -184  - Cardiac cath @[facility] 4/18/22: mLAD 90% s/p BERNABE, LCx mild disease, RCA mild disease.  - ECHO (03/2022, per MD note): mild LV dilation, EF 25-30%, mild RV dilation, trace AI/MR.   - ECHO 5/21/22: LVEF 10-15%, abnormal septal motion seen due to RV pacing, remaining segments demonstrate severe hypokinesis, D- shaped flattening of the interventricular septum due to RV pressure overload, normal RV size, reduced RVSF, dilated RA, mild AI/AS, pulm HTN (PASP 51 mmHg), mildly dilated ascending aorta, based on TTE 2016 the LVEF is lower is RV function is now reduced.   - s/p diagnostic cardiac cath w/ Dr Wagner on 05/23/2022   Plan:  - c/w ASA 81 mg PO QD  - c/w Plavix 75 mg PO QD  - c/w Atorvastatin 80 mg QHS    #Hyperlipidemia.   - , , HDL 29, LDL 84  - Home Atorvastatin increased to 80 mg PO qHS    GI  - No active issues    Renal  #JAGRUTI (acute kidney injury) CKD II  baseline Cr ~1.3  - monitor CR   - Avoid nephrotoxic agents, NSAIDs. Renally dose meds.         Assessment and Plan    67 y/o male w/ PMHx HTN, HLD, type 2 DM, chronic HFrEF (EF 25-30% by Echo), complete heart block s/p PPM (in 2006, upgraded to BiV-ICD in 09/2016), CAD (s/p recent BERNABE mid LAD at [facility] on 04/18/2022), and stage II CKD (baseline Cr ~1.3) admitted for intermittent vtach, now s/p unsuccessful VT ablation. Transferred to [facility] CICU for LVAD/OHT eval.     Plan  Neuro   - AAOx3  - No active issues    Respiratory  - No active issues  - comfortable on room air    Cardiovascular  #Cardiogenic shock   - central sat 35%, started on milrinone .125  - repeat labs: central sat 43%--> increased milrinone 0.25  - repeat labs: central sat 65.5%, CO 4.0, CI 2.1   - discussed with HF and Dr Leonardo       #VTach s/p EPS on 5/24, unable to perform ablation as no substrate found and did not induce VT because of severely low EF   - Interrogation of ICD @Dr Wilson's office 5/20: back-to-back episodes of VT @ 200+ bpm all terminating with ATP. Since last cath pt has had 41 VT episodes (all falling into VF zone)  - Normal device function. BIV pacing 97%. No programming changes made  -. c/w Amiodarone 400 mg PO BID load  - switched coreg to toprol XL 25mg BID       #Acute on chronic HFrEF (heart failure with reduced ejection fraction).   - ECHO 5/21/22 with LVEF 10-15% (reduced from 25-30% 3/2022)  - Received lasix 40 IVP x2 since 5/24 arrival on CCU with 1.8L urine output   - c/w  spironolactone 25mg daily  - Hold entresto at this time in setting of soft pressures  - Hold diuretics in setting of soft pressures and patient euvolemic   - will start farxiga on discharge     #CAD (coronary artery disease).    Chest pain free and compliant with DAPT since last cath 4/18/22  - Trop flat 0.04 x2 CK, D-dimer normal -184  - Cardiac cath @[facility] 4/18/22: mLAD 90% s/p BERNABE, LCx mild disease, RCA mild disease.  - ECHO (03/2022, per MD note): mild LV dilation, EF 25-30%, mild RV dilation, trace AI/MR.   - ECHO 5/21/22: LVEF 10-15%, abnormal septal motion seen due to RV pacing, remaining segments demonstrate severe hypokinesis, D- shaped flattening of the interventricular septum due to RV pressure overload, normal RV size, reduced RVSF, dilated RA, mild AI/AS, pulm HTN (PASP 51 mmHg), mildly dilated ascending aorta, based on TTE 2016 the LVEF is lower is RV function is now reduced.   - s/p diagnostic cardiac cath w/ Dr Wagner on 05/23/2022   Plan:  - c/w ASA 81 mg PO QD  - c/w Plavix 75 mg PO QD  - c/w Atorvastatin 80 mg QHS    #Hyperlipidemia.   - , , HDL 29, LDL 84  - Home Atorvastatin increased to 80 mg PO qHS    GI  - No active issues    Renal  #JAGRUTI (acute kidney injury) CKD II  baseline Cr ~1.3  - monitor CR   - Avoid nephrotoxic agents, NSAIDs. Renally dose meds.    Heme/Onc  No active issues    Endo  - T2DM  - c/w ISS    ID  - no active issues   - afebrile, wbc 11.27  - continue to monitor wbc and fever curve   - monitor off abx for now      Patient requires continuous monitoring with bedside rhythm monitoring, pulse ox monitoring, and intermittent blood gas analysis. Care plan discussed with ICU care team. Patient remained critical and at risk for life threatening decompensation.  Patient seen, examined and plan discussed with CCU team during rounds.     I have personally provided 35 minutes of critical care time excluding time spent on separate procedures, in addition to initial critical care time provided by the CICU Attending, Dr. Leonardo

## 2022-05-26 NOTE — PROGRESS NOTE ADULT - PROBLEM SELECTOR PLAN 1
Etiology: Likely mostly a nonischemic cardiomyopathy with an ischemic component, now with significant VT  GDMT: Continue Toprol 25mg BID  Diuretic: Agree with holding diuretics.  Device: Biv-ICD upgraded 2016 - 41 VT episodes with ATP, no shock required, no substrate on EP mapping for ablation and given severely reduced function, no attempt at inducing VT.   - Pending RHC today  - After RHC, plan for transfer for transplant/LVad evaluation at [facility]  - Monitor strict I/O  - Continue A-line monitoring  - Monitor electrolytes to maintain K.4 and MG>2. Etiology: Likely mostly a nonischemic cardiomyopathy with an ischemic component, now with significant VT  GDMT: Continue Toprol 25mg BID  Diuretic:   Device: Biv-ICD upgraded 2016 - 41 VT episodes with ATP, no shock required, no substrate on EP mapping for ablation and given severely reduced function, no attempt at inducing VT.   - Pending RHC today  - After RHC, plan for transfer for transplant/LVad evaluation at [facility]  - Monitor strict I/O  - Continue A-line monitoring  - Monitor electrolytes to maintain K.4 and MG>2. Etiology: Likely mostly a nonischemic cardiomyopathy with an ischemic component, now with significant VT  GDMT: Continue Toprol 25mg BID  Diuretic: Continue to hold diuretics   Device: Biv-ICD upgraded 2016 - 41 VT episodes with ATP, no shock required, no substrate on EP mapping for ablation and given severely reduced function, no attempt at inducing VT.   - S/p RHC, RHC 5/26/22: RA 11, RV 70/5/16, PA 69/32 (46), PCW 21, Pa sat 47, CO/CI 2.2/1.15  - Pending transfer for transplant/LVad evaluation at [facility]  - Monitor strict I/O  - Continue A-line monitoring  - Monitor electrolytes to maintain K.4 and MG>2.

## 2022-05-26 NOTE — DISCHARGE NOTE PROVIDER - NSDCCPCAREPLAN_GEN_ALL_CORE_FT
PRINCIPAL DISCHARGE DIAGNOSIS  Diagnosis: Ventricular tachycardia  Assessment and Plan of Treatment:       SECONDARY DISCHARGE DIAGNOSES  Diagnosis: Cardiomyopathy, nonischemic  Assessment and Plan of Treatment:     Diagnosis: CAD (coronary artery disease)  Assessment and Plan of Treatment:     Diagnosis: Chronic kidney disease, unspecified CKD stage  Assessment and Plan of Treatment:      PRINCIPAL DISCHARGE DIAGNOSIS  Diagnosis: Ventricular tachycardia  Assessment and Plan of Treatment: You were found to have an abnormal heart rhythm called ventricular tachycardia. This is when your heart beats so fast that strain is placed on heart and blood flow is reduced to vital organs like the brain. You are being transferred to [facility] for advanced therapies such as heart transplant. Please follow up with your cardiologist for further care. If you experience chest pain, shortness of breath, lightheadedness or fainting, please go to your nearest emergency room.      SECONDARY DISCHARGE DIAGNOSES  Diagnosis: CHF (congestive heart failure)  Assessment and Plan of Treatment: Heart failure occurs when your heart fails to pump adequately. You are being transferred to [facility] for advanced therapies such as heart transplant. We made several changes to your medication regimen. We started you on ENTRESTO and SPIRONOLACTONE. We discontinued coreg and started TOPROL. Finally, you will be started on FARXIGA on discharge. If you experience chest pain or shortness of breath, please go to the nearest emergency room.    Diagnosis: CAD (coronary artery disease)  Assessment and Plan of Treatment: You have a history of coronary artery disease. This is damage or disease in the heart's major blood vessels. The usual cause is the buildup of plaque. This causes coronary arteries to narrow, limiting blood flow to the heart. Coronary artery disease can range from no symptoms, to chest pain, to a heart attack. Please continue to take your medications as directed and follow up with your primary care doctor and cardiologist in 10-14 days.      Diagnosis: Acute kidney injury superimposed on CKD  Assessment and Plan of Treatment: During this hospital admission you were found to have acute kidney injury. Acute kidney injury (JAGRUTI) is a sudden episode of kidney failure or kidney damage that happens within a few hours or a few days. JAGRUTI causes a build-up of waste products in your blood and makes it hard for your kidneys to keep the right balance of fluid in your body. Your kidney numbers have been improving. Please follow up with your primary care doctor for further evaluation and testing.

## 2022-05-26 NOTE — PROGRESS NOTE ADULT - ASSESSMENT
INCOMPLETE    ASSESSMENT:  66M Mixed Ischemic/NICM Cardiomyopathy (EF 25-30% at bseline, s/p BIV-ICD), CAD (medically managed MIs in 2008,2011, Most recent stent in April 2022 to mLAD) presented with multiple near syncope, found to have 41 episodes of VT and admitted initially to cardiac telemetry for further evaluation/management. Ischemic evaluation without new disease and VT ablation without substrate to complete ablation. Therefore, Advanced HF consulted for further evaluation of mixed ischemic/nonischemic cardiomyopathy.       Cardiac Studies:  Pressure measurements during EP study: RA 18, RV 66/8, LA 33, LV 98/24  5/23/22 [facility]: LM Nl, pLAD mild disease, mLAD patent stent with slow flow, D1 oD1 40-50% (unchanged from previous [facility]), LCx, Mild disease, RCA mild disease, RPDA mild disease  4/18/22 [facility]: LM nl, mLAD 90% s/p BERNABE, LCx mild diffuse disease, OM1 mild diffuse, RCA diffuse disease.  TTE: LVEF 10-15%, LVIDD 5.2, abnormal septal motion due to RV pacing, D shaped flattening of IVS due to RV pressure overload, RV function reduced, RA dilated, Mild AI, Mild AS, PASP 51mmHg      INCOMPLETE ASSESSMENT:  66M Mixed Ischemic/NICM Cardiomyopathy (EF 25-30% at bseline, s/p BIV-ICD), CAD (medically managed MIs in 2008,2011, Most recent stent in April 2022 to mLAD) presented with multiple near syncope, found to have 41 episodes of VT and admitted initially to cardiac telemetry for further evaluation/management. Ischemic evaluation without new disease and VT ablation without substrate to complete ablation. Therefore, Advanced HF consulted for further evaluation of mixed ischemic/nonischemic cardiomyopathy.       Cardiac Studies:  RHC 5/26/22: RA 11, RV 70/5/16, PA 69/32 (46), PCW 21, Pa sat 47, CO/CI 2.2/1.15  Pressure measurements during EP study: RA 18, RV 66/8, LA 33, LV 98/24  5/23/22 [facility]: LM Nl, pLAD mild disease, mLAD patent stent with slow flow, D1 oD1 40-50% (unchanged from previous [facility]), LCx, Mild disease, RCA mild disease, RPDA mild disease  4/18/22 [facility]: LM nl, mLAD 90% s/p BERNABE, LCx mild diffuse disease, OM1 mild diffuse, RCA diffuse disease.  TTE: LVEF 10-15%, LVIDD 5.2, abnormal septal motion due to RV pacing, D shaped flattening of IVS due to RV pressure overload, RV function reduced, RA dilated, Mild AI, Mild AS, PASP 51mmHg

## 2022-05-27 DIAGNOSIS — I25.10 ATHEROSCLEROTIC HEART DISEASE OF NATIVE CORONARY ARTERY WITHOUT ANGINA PECTORIS: ICD-10-CM

## 2022-05-27 DIAGNOSIS — I50.22 CHRONIC SYSTOLIC (CONGESTIVE) HEART FAILURE: ICD-10-CM

## 2022-05-27 DIAGNOSIS — I47.2 VENTRICULAR TACHYCARDIA: ICD-10-CM

## 2022-05-27 DIAGNOSIS — I27.20 PULMONARY HYPERTENSION, UNSPECIFIED: ICD-10-CM

## 2022-05-27 DIAGNOSIS — N17.9 ACUTE KIDNEY FAILURE, UNSPECIFIED: ICD-10-CM

## 2022-05-27 DIAGNOSIS — R57.0 CARDIOGENIC SHOCK: ICD-10-CM

## 2022-05-27 LAB
ALBUMIN SERPL ELPH-MCNC: 3.1 G/DL — LOW (ref 3.3–5)
ALBUMIN SERPL ELPH-MCNC: 3.4 G/DL — SIGNIFICANT CHANGE UP (ref 3.3–5)
ALP SERPL-CCNC: 58 U/L — SIGNIFICANT CHANGE UP (ref 40–120)
ALP SERPL-CCNC: 58 U/L — SIGNIFICANT CHANGE UP (ref 40–120)
ALT FLD-CCNC: 13 U/L — SIGNIFICANT CHANGE UP (ref 10–45)
ALT FLD-CCNC: 17 U/L — SIGNIFICANT CHANGE UP (ref 10–45)
AMYLASE P1 CFR SERPL: 38 U/L — SIGNIFICANT CHANGE UP (ref 25–125)
ANION GAP SERPL CALC-SCNC: 11 MMOL/L — SIGNIFICANT CHANGE UP (ref 5–17)
ANION GAP SERPL CALC-SCNC: 13 MMOL/L — SIGNIFICANT CHANGE UP (ref 5–17)
AST SERPL-CCNC: 23 U/L — SIGNIFICANT CHANGE UP (ref 10–40)
AST SERPL-CCNC: 26 U/L — SIGNIFICANT CHANGE UP (ref 10–40)
BASE EXCESS BLDMV CALC-SCNC: 1.3 MMOL/L — SIGNIFICANT CHANGE UP (ref -3–3)
BASE EXCESS BLDMV CALC-SCNC: 2 MMOL/L — SIGNIFICANT CHANGE UP (ref -3–3)
BASE EXCESS BLDV CALC-SCNC: -1.3 MMOL/L — SIGNIFICANT CHANGE UP (ref -2–2)
BASE EXCESS BLDV CALC-SCNC: 1.5 MMOL/L — SIGNIFICANT CHANGE UP (ref -2–2)
BASOPHILS # BLD AUTO: 0.05 K/UL — SIGNIFICANT CHANGE UP (ref 0–0.2)
BASOPHILS NFR BLD AUTO: 0.5 % — SIGNIFICANT CHANGE UP (ref 0–2)
BILIRUB SERPL-MCNC: 0.9 MG/DL — SIGNIFICANT CHANGE UP (ref 0.2–1.2)
BILIRUB SERPL-MCNC: 1.2 MG/DL — SIGNIFICANT CHANGE UP (ref 0.2–1.2)
BUN SERPL-MCNC: 44 MG/DL — HIGH (ref 7–23)
BUN SERPL-MCNC: 48 MG/DL — HIGH (ref 7–23)
CA-I SERPL-SCNC: 1.09 MMOL/L — LOW (ref 1.15–1.33)
CA-I SERPL-SCNC: 1.19 MMOL/L — SIGNIFICANT CHANGE UP (ref 1.15–1.33)
CALCIUM SERPL-MCNC: 8.6 MG/DL — SIGNIFICANT CHANGE UP (ref 8.4–10.5)
CALCIUM SERPL-MCNC: 8.7 MG/DL — SIGNIFICANT CHANGE UP (ref 8.4–10.5)
CHLORIDE BLDV-SCNC: 102 MMOL/L — SIGNIFICANT CHANGE UP (ref 96–108)
CHLORIDE BLDV-SCNC: 97 MMOL/L — SIGNIFICANT CHANGE UP (ref 96–108)
CHLORIDE SERPL-SCNC: 100 MMOL/L — SIGNIFICANT CHANGE UP (ref 96–108)
CHLORIDE SERPL-SCNC: 99 MMOL/L — SIGNIFICANT CHANGE UP (ref 96–108)
CO2 BLDMV-SCNC: 29 MMOL/L — SIGNIFICANT CHANGE UP (ref 21–29)
CO2 BLDMV-SCNC: 29 MMOL/L — SIGNIFICANT CHANGE UP (ref 21–29)
CO2 BLDV-SCNC: 25 MMOL/L — SIGNIFICANT CHANGE UP (ref 22–26)
CO2 BLDV-SCNC: 29 MMOL/L — HIGH (ref 22–26)
CO2 SERPL-SCNC: 21 MMOL/L — LOW (ref 22–31)
CO2 SERPL-SCNC: 22 MMOL/L — SIGNIFICANT CHANGE UP (ref 22–31)
CREAT ?TM UR-MCNC: 130 MG/DL — SIGNIFICANT CHANGE UP
CREAT SERPL-MCNC: 1.65 MG/DL — HIGH (ref 0.5–1.3)
CREAT SERPL-MCNC: 1.79 MG/DL — HIGH (ref 0.5–1.3)
CRP SERPL-MCNC: 83 MG/L — HIGH (ref 0–4)
EGFR: 42 ML/MIN/1.73M2 — LOW
EGFR: 46 ML/MIN/1.73M2 — LOW
EOSINOPHIL # BLD AUTO: 0.13 K/UL — SIGNIFICANT CHANGE UP (ref 0–0.5)
EOSINOPHIL NFR BLD AUTO: 1.3 % — SIGNIFICANT CHANGE UP (ref 0–6)
FERRITIN SERPL-MCNC: 217 NG/ML — SIGNIFICANT CHANGE UP (ref 30–400)
GAS PNL BLDA: SIGNIFICANT CHANGE UP
GAS PNL BLDA: SIGNIFICANT CHANGE UP
GAS PNL BLDMV: SIGNIFICANT CHANGE UP
GAS PNL BLDMV: SIGNIFICANT CHANGE UP
GAS PNL BLDV: 130 MMOL/L — LOW (ref 136–145)
GAS PNL BLDV: 131 MMOL/L — LOW (ref 136–145)
GAS PNL BLDV: SIGNIFICANT CHANGE UP
GAS PNL BLDV: SIGNIFICANT CHANGE UP
GGT SERPL-CCNC: 20 U/L — SIGNIFICANT CHANGE UP (ref 9–50)
GLUCOSE BLDC GLUCOMTR-MCNC: 109 MG/DL — HIGH (ref 70–99)
GLUCOSE BLDC GLUCOMTR-MCNC: 129 MG/DL — HIGH (ref 70–99)
GLUCOSE BLDC GLUCOMTR-MCNC: 140 MG/DL — HIGH (ref 70–99)
GLUCOSE BLDC GLUCOMTR-MCNC: 145 MG/DL — HIGH (ref 70–99)
GLUCOSE BLDV-MCNC: 106 MG/DL — HIGH (ref 70–99)
GLUCOSE BLDV-MCNC: 129 MG/DL — HIGH (ref 70–99)
GLUCOSE SERPL-MCNC: 123 MG/DL — HIGH (ref 70–99)
GLUCOSE SERPL-MCNC: 139 MG/DL — HIGH (ref 70–99)
HCO3 BLDMV-SCNC: 27 MMOL/L — SIGNIFICANT CHANGE UP (ref 20–28)
HCO3 BLDMV-SCNC: 27 MMOL/L — SIGNIFICANT CHANGE UP (ref 20–28)
HCO3 BLDV-SCNC: 24 MMOL/L — SIGNIFICANT CHANGE UP (ref 22–29)
HCO3 BLDV-SCNC: 27 MMOL/L — SIGNIFICANT CHANGE UP (ref 22–29)
HCT VFR BLD CALC: 41.9 % — SIGNIFICANT CHANGE UP (ref 39–50)
HCT VFR BLDA CALC: 41 % — SIGNIFICANT CHANGE UP (ref 39–51)
HCT VFR BLDA CALC: 44 % — SIGNIFICANT CHANGE UP (ref 39–51)
HGB BLD CALC-MCNC: 13.6 G/DL — SIGNIFICANT CHANGE UP (ref 12.6–17.4)
HGB BLD CALC-MCNC: 14.5 G/DL — SIGNIFICANT CHANGE UP (ref 12.6–17.4)
HGB BLD-MCNC: 14.2 G/DL — SIGNIFICANT CHANGE UP (ref 13–17)
HIV 1 & 2 AB SERPL IA.RAPID: SIGNIFICANT CHANGE UP
HOROWITZ INDEX BLDMV+IHG-RTO: 28 — SIGNIFICANT CHANGE UP
HOROWITZ INDEX BLDV+IHG-RTO: 28 — SIGNIFICANT CHANGE UP
IMM GRANULOCYTES NFR BLD AUTO: 0.3 % — SIGNIFICANT CHANGE UP (ref 0–1.5)
IRON SATN MFR SERPL: 26 UG/DL — LOW (ref 45–165)
IRON SATN MFR SERPL: 9 % — LOW (ref 16–55)
LACTATE BLDV-MCNC: 0.9 MMOL/L — SIGNIFICANT CHANGE UP (ref 0.7–2)
LACTATE BLDV-MCNC: 1 MMOL/L — SIGNIFICANT CHANGE UP (ref 0.7–2)
LDH SERPL L TO P-CCNC: 239 U/L — SIGNIFICANT CHANGE UP (ref 50–242)
LIDOCAIN IGE QN: 23 U/L — SIGNIFICANT CHANGE UP (ref 7–60)
LYMPHOCYTES # BLD AUTO: 2.81 K/UL — SIGNIFICANT CHANGE UP (ref 1–3.3)
LYMPHOCYTES # BLD AUTO: 28.1 % — SIGNIFICANT CHANGE UP (ref 13–44)
MAGNESIUM SERPL-MCNC: 1.9 MG/DL — SIGNIFICANT CHANGE UP (ref 1.6–2.6)
MAGNESIUM SERPL-MCNC: 2.2 MG/DL — SIGNIFICANT CHANGE UP (ref 1.6–2.6)
MCHC RBC-ENTMCNC: 31 PG — SIGNIFICANT CHANGE UP (ref 27–34)
MCHC RBC-ENTMCNC: 33.9 GM/DL — SIGNIFICANT CHANGE UP (ref 32–36)
MCV RBC AUTO: 91.5 FL — SIGNIFICANT CHANGE UP (ref 80–100)
MONOCYTES # BLD AUTO: 1.13 K/UL — HIGH (ref 0–0.9)
MONOCYTES NFR BLD AUTO: 11.3 % — SIGNIFICANT CHANGE UP (ref 2–14)
NEUTROPHILS # BLD AUTO: 5.84 K/UL — SIGNIFICANT CHANGE UP (ref 1.8–7.4)
NEUTROPHILS NFR BLD AUTO: 58.5 % — SIGNIFICANT CHANGE UP (ref 43–77)
NRBC # BLD: 0 /100 WBCS — SIGNIFICANT CHANGE UP (ref 0–0)
NT-PROBNP SERPL-SCNC: 3102 PG/ML — HIGH (ref 0–300)
O2 CT VFR BLD CALC: 37 MMHG — SIGNIFICANT CHANGE UP (ref 30–65)
O2 CT VFR BLD CALC: 41 MMHG — SIGNIFICANT CHANGE UP (ref 30–65)
PCO2 BLDMV: 44 MMHG — SIGNIFICANT CHANGE UP (ref 30–65)
PCO2 BLDMV: 47 MMHG — SIGNIFICANT CHANGE UP (ref 30–65)
PCO2 BLDV: 40 MMHG — LOW (ref 42–55)
PCO2 BLDV: 46 MMHG — SIGNIFICANT CHANGE UP (ref 42–55)
PH BLDMV: 7.37 — SIGNIFICANT CHANGE UP (ref 7.32–7.45)
PH BLDMV: 7.4 — SIGNIFICANT CHANGE UP (ref 7.32–7.45)
PH BLDV: 7.38 — SIGNIFICANT CHANGE UP (ref 7.32–7.43)
PH BLDV: 7.38 — SIGNIFICANT CHANGE UP (ref 7.32–7.43)
PHOSPHATE SERPL-MCNC: 2.2 MG/DL — LOW (ref 2.5–4.5)
PHOSPHATE SERPL-MCNC: 2.8 MG/DL — SIGNIFICANT CHANGE UP (ref 2.5–4.5)
PLATELET # BLD AUTO: 176 K/UL — SIGNIFICANT CHANGE UP (ref 150–400)
PO2 BLDV: 36 MMHG — SIGNIFICANT CHANGE UP (ref 25–45)
PO2 BLDV: 38 MMHG — SIGNIFICANT CHANGE UP (ref 25–45)
POTASSIUM BLDV-SCNC: 3.6 MMOL/L — SIGNIFICANT CHANGE UP (ref 3.5–5.1)
POTASSIUM BLDV-SCNC: 4.2 MMOL/L — SIGNIFICANT CHANGE UP (ref 3.5–5.1)
POTASSIUM SERPL-MCNC: 4.3 MMOL/L — SIGNIFICANT CHANGE UP (ref 3.5–5.3)
POTASSIUM SERPL-MCNC: 4.6 MMOL/L — SIGNIFICANT CHANGE UP (ref 3.5–5.3)
POTASSIUM SERPL-SCNC: 4.3 MMOL/L — SIGNIFICANT CHANGE UP (ref 3.5–5.3)
POTASSIUM SERPL-SCNC: 4.6 MMOL/L — SIGNIFICANT CHANGE UP (ref 3.5–5.3)
PREALB SERPL-MCNC: 15 MG/DL — LOW (ref 20–40)
PROT ?TM UR-MCNC: 22 MG/DL — HIGH (ref 0–12)
PROT SERPL-MCNC: 6.5 G/DL — SIGNIFICANT CHANGE UP (ref 6–8.3)
PROT SERPL-MCNC: 6.5 G/DL — SIGNIFICANT CHANGE UP (ref 6–8.3)
PROT/CREAT UR-RTO: 0.2 RATIO — SIGNIFICANT CHANGE UP (ref 0–0.2)
PSA SERPL-MCNC: 0.65 NG/ML — SIGNIFICANT CHANGE UP (ref 0–4)
RBC # BLD: 4.58 M/UL — SIGNIFICANT CHANGE UP (ref 4.2–5.8)
RBC # FLD: 15.6 % — HIGH (ref 10.3–14.5)
RHEUMATOID FACT SERPL-ACNC: 13 IU/ML — SIGNIFICANT CHANGE UP (ref 0–13)
SAO2 % BLDMV: 57.3 — LOW (ref 60–90)
SAO2 % BLDMV: 66.3 — SIGNIFICANT CHANGE UP (ref 60–90)
SAO2 % BLDV: 57.1 % — LOW (ref 67–88)
SAO2 % BLDV: 65.5 % — LOW (ref 67–88)
SODIUM SERPL-SCNC: 133 MMOL/L — LOW (ref 135–145)
SODIUM SERPL-SCNC: 133 MMOL/L — LOW (ref 135–145)
T3FREE SERPL-MCNC: 1.32 PG/ML — LOW (ref 1.8–4.6)
T4 FREE SERPL-MCNC: 1.4 NG/DL — SIGNIFICANT CHANGE UP (ref 0.9–1.8)
TIBC SERPL-MCNC: 295 UG/DL — SIGNIFICANT CHANGE UP (ref 220–430)
TSH SERPL-MCNC: 1.93 UIU/ML — SIGNIFICANT CHANGE UP (ref 0.27–4.2)
UIBC SERPL-MCNC: 269 UG/DL — SIGNIFICANT CHANGE UP (ref 110–370)
URATE SERPL-MCNC: 11 MG/DL — HIGH (ref 3.4–8.8)
WBC # BLD: 9.99 K/UL — SIGNIFICANT CHANGE UP (ref 3.8–10.5)
WBC # FLD AUTO: 9.99 K/UL — SIGNIFICANT CHANGE UP (ref 3.8–10.5)

## 2022-05-27 PROCEDURE — 99292 CRITICAL CARE ADDL 30 MIN: CPT

## 2022-05-27 PROCEDURE — 70450 CT HEAD/BRAIN W/O DYE: CPT | Mod: 26

## 2022-05-27 PROCEDURE — 71250 CT THORAX DX C-: CPT | Mod: 26

## 2022-05-27 PROCEDURE — 99291 CRITICAL CARE FIRST HOUR: CPT

## 2022-05-27 PROCEDURE — 71045 X-RAY EXAM CHEST 1 VIEW: CPT | Mod: 26

## 2022-05-27 PROCEDURE — 99152 MOD SED SAME PHYS/QHP 5/>YRS: CPT

## 2022-05-27 PROCEDURE — 93010 ELECTROCARDIOGRAM REPORT: CPT

## 2022-05-27 PROCEDURE — 76700 US EXAM ABDOM COMPLETE: CPT | Mod: 26

## 2022-05-27 PROCEDURE — 93451 RIGHT HEART CATH: CPT | Mod: 26

## 2022-05-27 PROCEDURE — 99292 CRITICAL CARE ADDL 30 MIN: CPT | Mod: 25

## 2022-05-27 PROCEDURE — 93978 VASCULAR STUDY: CPT | Mod: 26

## 2022-05-27 PROCEDURE — 99291 CRITICAL CARE FIRST HOUR: CPT | Mod: 25

## 2022-05-27 PROCEDURE — 93306 TTE W/DOPPLER COMPLETE: CPT | Mod: 26

## 2022-05-27 RX ORDER — AMIODARONE HYDROCHLORIDE 400 MG/1
200 TABLET ORAL DAILY
Refills: 0 | Status: DISCONTINUED | OUTPATIENT
Start: 2022-06-02 | End: 2022-06-21

## 2022-05-27 RX ORDER — HYDRALAZINE HCL 50 MG
10 TABLET ORAL ONCE
Refills: 0 | Status: COMPLETED | OUTPATIENT
Start: 2022-05-27 | End: 2022-05-27

## 2022-05-27 RX ORDER — MAGNESIUM SULFATE 500 MG/ML
1 VIAL (ML) INJECTION ONCE
Refills: 0 | Status: COMPLETED | OUTPATIENT
Start: 2022-05-27 | End: 2022-05-27

## 2022-05-27 RX ORDER — AMIODARONE HYDROCHLORIDE 400 MG/1
400 TABLET ORAL
Refills: 0 | Status: COMPLETED | OUTPATIENT
Start: 2022-05-27 | End: 2022-06-01

## 2022-05-27 RX ORDER — SODIUM NITROPRUSSIDE 50 MG/2ML
0.25 INJECTION INTRAVENOUS
Qty: 100 | Refills: 0 | Status: DISCONTINUED | OUTPATIENT
Start: 2022-05-27 | End: 2022-05-28

## 2022-05-27 RX ADMIN — MILRINONE LACTATE 8.27 MICROGRAM(S)/KG/MIN: 1 INJECTION, SOLUTION INTRAVENOUS at 18:18

## 2022-05-27 RX ADMIN — Medication 10 MILLIGRAM(S): at 15:20

## 2022-05-27 RX ADMIN — Medication 25 MILLIGRAM(S): at 06:35

## 2022-05-27 RX ADMIN — Medication 100 GRAM(S): at 06:36

## 2022-05-27 RX ADMIN — HEPARIN SODIUM 5000 UNIT(S): 5000 INJECTION INTRAVENOUS; SUBCUTANEOUS at 14:54

## 2022-05-27 RX ADMIN — MILRINONE LACTATE 5.51 MICROGRAM(S)/KG/MIN: 1 INJECTION, SOLUTION INTRAVENOUS at 08:33

## 2022-05-27 RX ADMIN — SODIUM NITROPRUSSIDE 2.76 MICROGRAM(S)/KG/MIN: 50 INJECTION INTRAVENOUS at 17:24

## 2022-05-27 RX ADMIN — MILRINONE LACTATE 8.27 MICROGRAM(S)/KG/MIN: 1 INJECTION, SOLUTION INTRAVENOUS at 12:03

## 2022-05-27 RX ADMIN — MILRINONE LACTATE 8.27 MICROGRAM(S)/KG/MIN: 1 INJECTION, SOLUTION INTRAVENOUS at 21:29

## 2022-05-27 RX ADMIN — Medication 75 MILLIGRAM(S): at 12:07

## 2022-05-27 RX ADMIN — HEPARIN SODIUM 5000 UNIT(S): 5000 INJECTION INTRAVENOUS; SUBCUTANEOUS at 21:28

## 2022-05-27 RX ADMIN — POLYETHYLENE GLYCOL 3350 17 GRAM(S): 17 POWDER, FOR SOLUTION ORAL at 12:06

## 2022-05-27 RX ADMIN — AMIODARONE HYDROCHLORIDE 400 MILLIGRAM(S): 400 TABLET ORAL at 05:04

## 2022-05-27 RX ADMIN — Medication 81 MILLIGRAM(S): at 12:07

## 2022-05-27 RX ADMIN — CHLORHEXIDINE GLUCONATE 1 APPLICATION(S): 213 SOLUTION TOPICAL at 05:36

## 2022-05-27 RX ADMIN — CLOPIDOGREL BISULFATE 75 MILLIGRAM(S): 75 TABLET, FILM COATED ORAL at 12:06

## 2022-05-27 RX ADMIN — HEPARIN SODIUM 5000 UNIT(S): 5000 INJECTION INTRAVENOUS; SUBCUTANEOUS at 05:04

## 2022-05-27 RX ADMIN — ATORVASTATIN CALCIUM 80 MILLIGRAM(S): 80 TABLET, FILM COATED ORAL at 21:28

## 2022-05-27 RX ADMIN — PANTOPRAZOLE SODIUM 40 MILLIGRAM(S): 20 TABLET, DELAYED RELEASE ORAL at 05:05

## 2022-05-27 RX ADMIN — SPIRONOLACTONE 25 MILLIGRAM(S): 25 TABLET, FILM COATED ORAL at 05:04

## 2022-05-27 RX ADMIN — SODIUM NITROPRUSSIDE 2.76 MICROGRAM(S)/KG/MIN: 50 INJECTION INTRAVENOUS at 21:29

## 2022-05-27 RX ADMIN — AMIODARONE HYDROCHLORIDE 400 MILLIGRAM(S): 400 TABLET ORAL at 17:29

## 2022-05-27 RX ADMIN — SENNA PLUS 2 TABLET(S): 8.6 TABLET ORAL at 21:28

## 2022-05-27 NOTE — CONSULT NOTE ADULT - ASSESSMENT
66M Mixed Ischemic/NICM Cardiomyopathy (EF 25-30% at baseline, s/p BIV-ICD), CAD (medically managed MIs in 2008,2011, most recent stent in April 2022 to mLAD) presented to [facility] with multiple near syncope was found to have 41 episodes of VT. He underwent VT ablation which was unsuccessful. RHC at [facility] and showed patient to have mildly elevated filling pressures with low cardiac index. He was transferred to [facility] for advance therapy eval. Upon arrival he was noted to have venous sats in the 30s for which been placed on Primacor. Underwent a second RHC today which continue to show mildly elevated filling pressures and low cardiac index. His Primacor has been adjusted further and he has had no further episodes of VT. Currently undergoing an expedited  LVAD/transplant eval.    67 YO M with a history of ACC/AHA Stage C->D mixed NICM/ICM (likely familial with strong FH and early arrhythmia history in his 30's) with LVED 5.2 cm and LVEF 10-15% s/p PPM upgraded to CRT-D, CAD s/p PCI to mLAD 4/2022, well controlled DM2 (A1c 6.2%), and CKD III (Cr 1.4) who initially presented to [facility] 5/20 with near syncope in setting of worsening HF symptoms and found to have 41 episodes of VT, many terminating with ATP. LHC did not reveal new obstructive CAD and her underwent EPS which did not reveal endocardial substrate. RHC revealed severely depressed cardiac output and he was transferred to [facility] 5/26 for advanced therapies evaluation.    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 He has a low cardiac output with evidence of JAGRUTI but rest of end organ function is intact. His CI remains < 2.1 at this time but we are escalating inotropes without VT and  He is INTERMACS 2-3 and stage B. He will need advanced therapies this admission and likely will need unloading with IABP/Impella. His PVR is too high for transplant at this time but suspect would improve with medical/device optimization.       Review of studies  TTE 5/21: LV 5.2 cm, LVEF 10-15%, LVOT VTI 10 cm, moderate RV dysfunction, mild AI, minimal MR  EKG: a-BiV paced  [facility] [place] 5/23: patent mLAD stent with slow flow, D1 with 40-50% stenosis, mild disease otherwise  C 5/26: RA 12, PA 70/40 (50), PCWP 22, Milana CO/CI 2.3/1.3, MAP 83 with SVR 2469, PVR 12 PERSAUD    Hemodynamics  5/27: RA 10, PA 76/35 (49), PCWP 34, PA sat 57% with Milana CO/CI 3.1/1.6, MAP 71 with SVR 1574, PVR 4.8   67 YO M with a history of ACC/AHA Stage C->D mixed NICM/ICM (likely familial with strong FH and early arrhythmia history in his 30's) with LVED 5.2 cm and LVEF 10-15% s/p PPM upgraded to CRT-D, CAD s/p PCI to mLAD 4/2022, well controlled DM2 (A1c 6.2%), and CKD III (Cr 1.4) who initially presented to [facility] 5/20 with near syncope in setting of worsening HF symptoms and found to have 41 episodes of VT, many terminating with ATP. LHC did not reveal new obstructive CAD and her underwent EPS which did not reveal endocardial substrate. RHC revealed severely depressed cardiac output and he was transferred to [facility] 5/26 for advanced therapies evaluation.    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 He has a low cardiac output with evidence of JAGRUTI but rest of end organ function is intact. His CI remains < 2.1 at this time but we are escalating inotropes without VT and  He is INTERMACS 2-3 and stage B. He will need advanced therapies this admission and likely will need unloading with IABP/Impella. His PVR is too high for transplant at this time but suspect would improve with medical/device optimization.     Review of studies  TTE 5/21: LV 5.2 cm, LVEF 10-15%, LVOT VTI 10 cm, moderate RV dysfunction, mild AI, minimal MR  EKG: a-BiV paced  [facility] 5/23: patent mLAD stent with slow flow, D1 with 40-50% stenosis, mild disease otherwise  C 5/26: RA 12, PA 70/40 (50), PCWP 22, Milana CO/CI 2.3/1.3, MAP 83 with SVR 2469, PVR 12 PERSAUD    Hemodynamics  5/27: RA 10, PA 76/35 (49), PCWP 34, PA sat 57% with Milana CO/CI 3.1/1.6, MAP 71 with SVR 1574, PVR 4.8   67 YO M with a history of ACC/AHA Stage C->D mixed NICM/ICM (likely familial with strong FH and early arrhythmia history in his 30's) with LVED 5.2 cm and LVEF 10-15% s/p PPM upgraded to CRT-D, CAD s/p PCI to mLAD 4/2022, well controlled DM2 (A1c 6.2%), and CKD III (Cr 1.4) who initially presented to [facility] 5/20 with near syncope in setting of worsening HF symptoms and found to have 41 episodes of VT, many terminating with ATP. LHC did not reveal new obstructive CAD and her underwent EPS which did not reveal endocardial substrate amenable for ablation. RHC revealed severely depressed cardiac output and he was transferred to [facility] 5/26 for advanced therapies evaluation.    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 He has a low cardiac output with evidence of JAGRUTI but rest of end organ function is intact. His CI remains < 2.1 at this time but we are escalating inotropes without VT and he has some room for afterload reduction. He is INTERMACS 2-3 and SCAI stage B. He will likely need advanced therapies this admission and suspect he will need unloading with IABP/Impella. His PVR is too high for transplant at this time but may improve with medical/device optimization.     Review of studies  TTE 5/21: LV 5.2 cm, LVEF 10-15%, LVOT VTI 10 cm, moderate RV dysfunction, mild AI, minimal MR  EKG: a-BiV paced  [facility] 5/23: patent mLAD stent with slow flow, D1 with 40-50% stenosis, mild disease otherwise  C 5/26: RA 12, PA 70/40 (50), PCWP 22, Milana CO/CI 2.3/1.3, MAP 83 with SVR 2469, PVR 12 PERSAUD    Hemodynamics  5/27: RA 10, PA 76/35 (49), PCWP 34, PA sat 57% with Milana CO/CI 3.1/1.6, MAP 71 with SVR 1574, PVR 4.8

## 2022-05-27 NOTE — CONSULT NOTE ADULT - NS PANP COMMENT GEN_ALL_CORE FT
I was asked to see Mr Du by Dr. Leonardo / Justin for surgical consultation as part of his MCS/heart transplant workup. After reviewing his medical history and studies, I saw and examined the patient. He appeared to be pleasant and motivated to be successful with advanced surgical therapies. I believe he is a reasonable candidate from a surgical perspective. After counseling and discussing lifestyle modification, and risks, benefits and alternatives to transplantation and mechanical circulatory support, all of his questions and concerns were answered.     Specific Education:  1) Discussed in detail the requirement and process for the Transplant Committee to review and list him for transplantation.    2) Discussed in detail the post-transplant process, including when to call a coordinator, frequent labs, clinic visits, pharmacy and insurance issues.    3) Discussed in detail PHS increased risk and HCV+ donors. He stated the he would consider these potential donors.      4) Discussed in detail DCD with or without NRP amd ex vivo perfusion options. He stated the he would consider these potential organs. I was asked to see Mr Du by Dr. Leonardo / Justin for surgical consultation as part of his MCS/heart transplant workup. After reviewing his medical history and studies, I saw and examined the patient. He appeared to be pleasant and motivated to be successful with advanced surgical therapies. I believe he is a reasonable candidate from a surgical perspective. After counseling and discussing lifestyle modification, and risks, benefits and alternatives to transplantation and mechanical circulatory support, all of his questions and concerns were answered.     Specific Education:  1) Discussed in detail the requirement and process for the Transplant Committee to review and list him for transplantation.    2) Discussed in detail the post-transplant process, including when to call a coordinator, frequent labs, clinic visits, pharmacy and insurance issues.    3) Discussed in detail PHS increased risk and HCV+ donors. He stated the he would consider these potential donors.      4) Discussed in detail DCD with or without NRP and ex vivo perfusion options. He stated the he would consider these potential organs.

## 2022-05-27 NOTE — CONSULT NOTE ADULT - PROBLEM SELECTOR RECOMMENDATION 5
- hx of VT s/p unsuccessful VT ablation  - remains on Amio 400 mg PO BID  - since being admitted to [facility] has not had any episodes of VT, currently tolerating Milrinone as stated above - hx of VT s/p unsuccessful VT ablation  - remains on Amio 400 mg PO BID  - since being admitted to [facility] has not had any episodes of VT, currently tolerating milrinone

## 2022-05-27 NOTE — PROCEDURE NOTE - ADDITIONAL PROCEDURE DETAILS
AP-: 99.1%    0 shocks, 0 treated VT/VF episodes       Changes Made:   Lower rate increased from DDD70 to DDD80 AP-: 99.1%  0 shocks, 0 treated VT/VF episodes   Changes Made:   Lower rate increased from DDD70 to DDD80

## 2022-05-27 NOTE — CONSULT NOTE ADULT - PROBLEM SELECTOR RECOMMENDATION 2
- s/p PCI in April 2022  - currently on ASA 81 PO and Plavix 75 mg PO QD  - as patient is nearing completion of his advance therapy will need to discuss stopping plavix - most recent ECHO shows reduce EF 25-30%  - under went RHC on 5/27 showed mildly elevated filling pressures with low cardiac output  - increase primacor 0.375 mcg/kg/min  - discontinue Toprol due to liable MAPs  - discontinue Aldactone due to JAGRUTI  - currently undergoing advance therapy eval for LVAD/transplant. If all testing is completed, plan to present to selection committee this Thursday - discontinue metoprolol given critically low cardiac output   - stop spironolactone with JAGRUTI  - lasix 40 mg IV PRN for goal CVP 8-10  - nipride for vasodilators as above  - currently undergoing advance therapy eval for LVAD/transplant. If all testing is completed, plan to present to selection committee this Thursday. will need colonoscopy when better compensated

## 2022-05-27 NOTE — PROGRESS NOTE ADULT - CRITICAL CARE ATTENDING COMMENT
Presented to [facility] with VT, found to be in cardiogenic shock and transferred to [facility]  A+Ox3  Cardiogenic shock, improved after adding Milrinone, CI 1.6, SVR 2000s, SBP 90s - will get RHC, may place IABP  Continue Toprol and Amio PO for VT  Continue DAPT with ASA/Plavix for recent stent  O2 sats mid 90s on room air  DASH/diabetic diet  Non-oliguric JAGRUTI, CVP 10, improved with Milrinone - should diurese as perfusion improves/afterload decreases  H/H acceptable on HSQ for DVT prophylaxis   Afebrile, no antibiotics  Sugars controlled  RIJ Cordis (placed at [facility]) 5/26 - will place Spencer Presented to [facility] with VT, found to be in cardiogenic shock and transferred to [facility]  A+Ox3  Cardiogenic shock, improved after adding Milrinone, CI 1.6, SVR 2000s, SBP 90s - will get RHC, may place IABP  Continue Toprol and Amio PO for VT  Continue DAPT with ASA/Plavix for recent stent  O2 sats mid 90s on room air  DASH/diabetic diet  Non-oliguric JAGRUTI, CVP 14, improved with Milrinone - will likely diurese post RHC  H/H acceptable on HSQ for DVT prophylaxis   Afebrile, no antibiotics  Sugars controlled  RIJ Cordis (placed at [facility]) 5/26 - will place Spencer Presented to [facility] with VT, found to be in cardiogenic shock and transferred to [facility]  A+Ox3  Cardiogenic shock, improved after adding Milrinone, CI 1.6, SVR 2000s, SBP 90s - will get RHC, may place IABP  Continue Toprol and Amio PO for VT  Continue DAPT with ASA/Plavix for recent stent  O2 sats mid 90s on room air  DASH/diabetic diet  Non-oliguric JAGRUTI, CVP 12, improved with Milrinone - monitor diuresis as afterload decreases  H/H acceptable on HSQ for DVT prophylaxis   Afebrile, no antibiotics  Sugars controlled  RIJ Cordis (placed at [facility]) 5/26 - will place Spencer

## 2022-05-27 NOTE — PROGRESS NOTE ADULT - SUBJECTIVE AND OBJECTIVE BOX
CICU DAY NOTE  Admission date: 5/26/22  Chief complaint/ Diagnosis: ADHF  HPI: 66M Mixed Ischemic/NICM Cardiomyopathy (EF 25-30% at baseline, s/p BIV-ICD), CAD (medically managed MIs in 2008,2011, Most recent stent in April 2022 to mLAD) presented with multiple near syncope, found to have 41 episodes of VT and admitted initially to cardiac telemetry for further evaluation/management. Ischemic evaluation without new disease and VT ablation without substrate to complete ablation.   Transferred from [facility] for further management and evaluation for LVAD vs OHT.     Interval history: Milrinone@0.25  Mixed 57.1 lactate 1.0    REVIEW OF SYSTEMS  Denies CP, Palpitation, SOB, Dyspnea [ x ] All other systems negative    MEDICATIONS  (STANDING):  allopurinol 75 milliGRAM(s) Oral daily  aMIOdarone    Tablet 400 milliGRAM(s) Oral two times a day  aspirin enteric coated 81 milliGRAM(s) Oral daily  atorvastatin 80 milliGRAM(s) Oral at bedtime  chlorhexidine 4% Liquid 1 Application(s) Topical <User Schedule>  clopidogrel Tablet 75 milliGRAM(s) Oral daily  glucagon  Injectable 1 milliGRAM(s) IntraMuscular once  heparin   Injectable 5000 Unit(s) SubCutaneous every 8 hours  influenza   Vaccine 0.5 milliLiter(s) IntraMuscular once  insulin lispro (ADMELOG) corrective regimen sliding scale   SubCutaneous three times a day before meals  insulin lispro (ADMELOG) corrective regimen sliding scale   SubCutaneous at bedtime  metoprolol succinate ER 25 milliGRAM(s) Oral daily  milrinone Infusion 0.25 MICROgram(s)/kG/Min (5.51 mL/Hr) IV Continuous <Continuous>  pantoprazole    Tablet 40 milliGRAM(s) Oral before breakfast  polyethylene glycol 3350 17 Gram(s) Oral daily  senna 2 Tablet(s) Oral at bedtime  spironolactone 25 milliGRAM(s) Oral daily    MEDICATIONS  (PRN):  dextrose Oral Gel 15 Gram(s) Oral once PRN Blood Glucose LESS THAN 70 milliGRAM(s)/deciliter    PAST MEDICAL & SURGICAL HISTORY:  AV block  Essential hypertension  Chronic HFrEF (heart failure with reduced ejection fraction)  Chronic kidney disease, unspecified CKD stage  CAD (coronary artery disease)  HLD (hyperlipidemia)  Type 2 diabetes mellitus  Artificial cardiac pacemaker    FAMILY HISTORY:  Family history of acute myocardial infarction (Father) 72    Allergy   No Known Allergies    ICU Vital Signs Last 24 Hrs  T(C): 36.7 (Max: 36.8)  HR: 69  (69 - 69)  ABP: 102/66  (89/60 - 116/82)  ABP(mean): 78  (68 - 93)  RR: 19  (16 - 34)  SpO2: 96% (91% - 98%)    I&O's Summary  IN: 529.7 mL / OUT: 700 mL / NET: -170.3 mL    PHYSICAL EXAM  Appearance: Normal, NAD  HEAD:   Normocephalic  EYES:  PERRLA, conjunctiva and sclera clear  NECK: Supple, No JVD  CHEST/LUNG: Clear to auscultation bilaterally; No wheezing  HEART: Normal S1, S2. No murmurs, rubs, or gallops  ABDOMEN: + Bowel sounds, Soft, NT, ND   EXTREMITIES:  2+ Peripheral Pulses, No clubbing, cyanosis, or edema  NEUROLOGY: non-focal, aaox3  SKIN: No rashes or lesions    Interpretation of Telemetry:                        14.2   9.99  )-----------( 176      ( 27 May 2022 05:04 )             41.9       133<L>  |  99  |  48<H>  ----------------------------<  139<H>  4.3   |  21<L>  |  1.79<H>    Ca    8.6   Phos  2.8     Mg     1.9   TPro  6.5  /  Alb  3.1<L>  /  TBili  1.2  /  DBili  x   /  AST  26  /  ALT  13  /  AlkPhos  58       CARDIAC MARKERS ( 26 May 2022 20:22 )  x     / x     / 210 U/L / x     / 2.2 ng/mL  ABG - ( 27 May 2022 05:00 )  pH, Arterial: 7.44  /  pCO2: 37    /  pO2: 107   / HCO3: 25    / Base Excess: 1.2   /  SaO2: 98.8    F/S 127-132       CICU DAY NOTE  Admission date: 5/26/22  Chief complaint/ Diagnosis: ADHF  HPI: 66M Mixed Ischemic/NICM Cardiomyopathy (EF 25-30% at baseline, s/p BIV-ICD), CAD (medically managed MIs in 2008,2011, Most recent stent in April 2022 to mLAD) presented with multiple near syncope, found to have 41 episodes of VT w/ unsuccessful vt ablation and s/p LHC w/ no new disease (m LAD patent) Transferred from [facility] for further management and evaluation for LVAD vs OHT.     Interval history: Milrinone@0.25  central sat 57.1 CO/CI 3.0/1.6 SVR 2000 CVP 10 lactate 1.0  Currently on amio po load No further VT episode     REVIEW OF SYSTEMS  Denies CP, Palpitation, SOB, Dyspnea [ x ] All other systems negative    MEDICATIONS  (STANDING):  allopurinol 75 milliGRAM(s) Oral daily  aMIOdarone    Tablet 400 milliGRAM(s) Oral two times a day  aspirin enteric coated 81 milliGRAM(s) Oral daily  atorvastatin 80 milliGRAM(s) Oral at bedtime  chlorhexidine 4% Liquid 1 Application(s) Topical <User Schedule>  clopidogrel Tablet 75 milliGRAM(s) Oral daily  glucagon  Injectable 1 milliGRAM(s) IntraMuscular once  heparin   Injectable 5000 Unit(s) SubCutaneous every 8 hours  influenza   Vaccine 0.5 milliLiter(s) IntraMuscular once  insulin lispro (ADMELOG) corrective regimen sliding scale   SubCutaneous three times a day before meals  insulin lispro (ADMELOG) corrective regimen sliding scale   SubCutaneous at bedtime  metoprolol succinate ER 25 milliGRAM(s) Oral daily  milrinone Infusion 0.25 MICROgram(s)/kG/Min (5.51 mL/Hr) IV Continuous <Continuous>  pantoprazole    Tablet 40 milliGRAM(s) Oral before breakfast  polyethylene glycol 3350 17 Gram(s) Oral daily  senna 2 Tablet(s) Oral at bedtime  spironolactone 25 milliGRAM(s) Oral daily    MEDICATIONS  (PRN):  dextrose Oral Gel 15 Gram(s) Oral once PRN Blood Glucose LESS THAN 70 milliGRAM(s)/deciliter    PAST MEDICAL & SURGICAL HISTORY:  AV block  Essential hypertension  Chronic HFrEF (heart failure with reduced ejection fraction)  Chronic kidney disease, unspecified CKD stage  CAD (coronary artery disease)  HLD (hyperlipidemia)  Type 2 diabetes mellitus  Artificial cardiac pacemaker    FAMILY HISTORY:  Family history of acute myocardial infarction (Father) 72    Allergy   No Known Allergies    ICU Vital Signs Last 24 Hrs  T(C): 36.7 (Max: 36.8)  HR: 69  (69 - 69)  ABP: 102/66  (89/60 - 116/82)  ABP(mean): 78  (68 - 93)  RR: 19  (16 - 34)  SpO2: 96% (91% - 98%) on NC 2L     I&O's Summary  IN: 529.7 mL / OUT: 700 mL / NET: -170.3 mL    PHYSICAL EXAM  Appearance: Normal, NAD  HEAD:   Normocephalic  EYES:  PERRLA, conjunctiva and sclera clear  NECK: Supple, No JVD  CHEST/LUNG: Clear to auscultation bilaterally; No wheezing  HEART: Normal S1, S2. No murmurs, rubs, or gallops  ABDOMEN: + Bowel sounds, Soft, NT, ND   EXTREMITIES:  2+ Peripheral Pulses, No clubbing, cyanosis, or edema  NEUROLOGY: non-focal, aaox3  SKIN: No rashes or lesions    Interpretation of Telemetry:                        14.2   9.99  )-----------( 176      ( 27 May 2022 05:04 )             41.9       133<132<127  |  99  |  48<H>  ----------------------------<  139<H>  4.3   |  21<L>  |  1.79<2.03<2.07    Ca    8.6   Phos  2.8     Mg     1.9   TPro  6.5  /  Alb  3.1<L>  /  TBili  1.2  /  DBili  x   /  AST  26  /  ALT  13  /  AlkPhos  58       CARDIAC MARKERS ( 26 May 2022 20:22 )  x     / x     / 210 U/L / x     / 2.2 ng/mL  ABG - ( 27 May 2022 05:00 )  pH, Arterial: 7.44  /  pCO2: 37    /  pO2: 107   / HCO3: 25    / Base Excess: 1.2   /  SaO2: 98.8    F/S 127-132

## 2022-05-27 NOTE — CONSULT NOTE ADULT - PROBLEM SELECTOR RECOMMENDATION 3
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

## 2022-05-27 NOTE — CONSULT NOTE ADULT - SUBJECTIVE AND OBJECTIVE BOX
HPI: 66M Mixed Ischemic/NICM Cardiomyopathy (EF 25-30% at baseline, s/p BIV-ICD), CAD (medically managed MIs in ,, Most recent stent in 2022 to mLAD) presented with multiple near syncope, found to have 41 episodes of VT and admitted initially to cardiac telemetry for further evaluation/management. Ischemic evaluation without new disease and VT ablation without substrate to complete ablation.   Transferred from [facility] for further management and evaluation for LVAD vs OHT.       PAST MEDICAL & SURGICAL HISTORY:  AV block      Essential hypertension      Chronic HFrEF (heart failure with reduced ejection fraction)      Chronic kidney disease, unspecified CKD stage      CAD (coronary artery disease)      HLD (hyperlipidemia)      Type 2 diabetes mellitus      Artificial cardiac pacemaker          FAMILY HISTORY:  Family history of acute myocardial infarction (Father)  72        Allergies    No Known Allergies    Intolerances        Home Medications:  allopurinol: 75 milligram(s) orally once a day (26 May 2022 13:34)  amiodarone 200 mg oral tablet: 2 tab(s) orally every 12 hours (26 May 2022 13:33)  glimepiride 1 mg oral tablet: 1 tab(s) orally once a day (20 May 2022 17:03)  Lasix 40 mg oral tablet: 1 tab(s) orally once a day, As Needed (20 May 2022 17:03)  Lipitor 80 mg oral tablet: 1 tab(s) orally once a day (at bedtime) (26 May 2022 13:33)  metoprolol succinate 25 mg oral tablet, extended release: 1 tab(s) orally every 12 hours (26 May 2022 13:33)  pantoprazole 40 mg oral delayed release tablet: 1 tab(s) orally once a day (before a meal) (26 May 2022 13:34)  spironolactone 25 mg oral tablet: 1 tab(s) orally once a day (26 May 2022 13:33)  Vitamin D3 1250 mcg (50,000 intl units) oral capsule: 1 cap(s) orally once a week (20 May 2022 17:03)      Medications:  allopurinol 75 milliGRAM(s) Oral daily  aMIOdarone    Tablet 400 milliGRAM(s) Oral two times a day  aspirin enteric coated 81 milliGRAM(s) Oral daily  atorvastatin 80 milliGRAM(s) Oral at bedtime  chlorhexidine 4% Liquid 1 Application(s) Topical <User Schedule>  clopidogrel Tablet 75 milliGRAM(s) Oral daily  dextrose 5%. 1000 milliLiter(s) IV Continuous <Continuous>  dextrose 5%. 1000 milliLiter(s) IV Continuous <Continuous>  dextrose 50% Injectable 25 Gram(s) IV Push once  dextrose 50% Injectable 12.5 Gram(s) IV Push once  dextrose 50% Injectable 25 Gram(s) IV Push once  dextrose Oral Gel 15 Gram(s) Oral once PRN  glucagon  Injectable 1 milliGRAM(s) IntraMuscular once  heparin   Injectable 5000 Unit(s) SubCutaneous every 8 hours  influenza   Vaccine 0.5 milliLiter(s) IntraMuscular once  insulin lispro (ADMELOG) corrective regimen sliding scale   SubCutaneous three times a day before meals  insulin lispro (ADMELOG) corrective regimen sliding scale   SubCutaneous at bedtime  milrinone Infusion 0.375 MICROgram(s)/kG/Min IV Continuous <Continuous>  pantoprazole    Tablet 40 milliGRAM(s) Oral before breakfast  polyethylene glycol 3350 17 Gram(s) Oral daily  senna 2 Tablet(s) Oral at bedtime    ICU Vital Signs Last 24 Hrs  T(C): 36.4 (27 May 2022 12:00), Max: 36.8 (26 May 2022 20:00)  T(F): 97.5 (27 May 2022 12:00), Max: 98.2 (26 May 2022 20:00)  HR: 69 (27 May 2022 13:00) (69 - 69)  BP: 99/74 (26 May 2022 21:00) (99/74 - 106/77)  BP(mean): 83 (26 May 2022 21:00) (82 - 87)  ABP: 105/72 (27 May 2022 13:00) (90/57 - 117/74)  ABP(mean): 83 (27 May 2022 13:00) (68 - 93)  RR: 18 (27 May 2022 13:00) (18 - 34)  SpO2: 97% (27 May 2022 13:00) (91% - 98%)      Weight in k.2 ( @ 05:00)    I&O's Summary    26 May 2022 07:01  -  27 May 2022 07:00  --------------------------------------------------------  IN: 535.2 mL / OUT: 700 mL / NET: -164.8 mL    27 May 2022 07:01  -  27 May 2022 14:46  --------------------------------------------------------  IN: 275.8 mL / OUT: 300 mL / NET: -24.2 mL        Physical Exam  General: No distress. Comfortable.  Neck: JVP ~22cm  Chest: decreased breath sounds b/l  CV: Normal S1 and S2. No murmurs, rub, or gallops. Radial pulses normal.  Abdomen: Soft, non-distended, non-tender  Neurology: Alert and oriented times three    Labs:                        14.2   9.99  )-----------( 176      ( 27 May 2022 05:04 )             41.9         133<L>  |  99  |  48<H>  ----------------------------<  139<H>  4.3   |  21<L>  |  1.79<H>    Ca    8.6      27 May 2022 05:04  Phos  2.8       Mg     1.9         TPro  6.5  /  Alb  3.1<L>  /  TBili  1.2  /  DBili  x   /  AST  26  /  ALT  13  /  AlkPhos  58      PT/INR - ( 26 May 2022 20:22 )   PT: 13.8 sec;   INR: 1.20 ratio         PTT - ( 26 May 2022 20:22 )  PTT:30.3 sec  CARDIAC MARKERS ( 26 May 2022 20:22 )  x     / x     / 210 U/L / x     / 2.2 ng/mL      Serum Pro-Brain Natriuretic Peptide: 4206 pg/mL ( @ 20:22)  Serum Pro-Brain Natriuretic Peptide: 2846 pg/mL ( @ 17:22)  Creatine Kinase, Serum: 210 U/L (22 @ 20:22)  Creatine Kinase, Serum: 210 U/L (22 @ 20:22)        Lactate, Blood: 1.1 mmol/L ( @ 04:34)  Lactate, Blood: 1.8 mmol/L ( @ 17:22)        Imaging Studies     < from: TTE Limited Echo w/o Cont (22 @ 10:31) >  -----  CONCLUSIONS:     1. Limited study obtained for evaluation of left ventricular function.   2. Left ventricular systolic function is severely reduced with a   calculated ejection fraction of 12% with regional wall motion   abnormalities.   3. Multiple segmental abnormalities exist. See findings.   4. Right ventricular systolic function is moderately reduced. A device   lead is noted in the right heart.   5. No pericardial effusion.    ---------------------------------------------------------------------------  -----  2D AND M-MODE MEASUREMENTS (normal ranges within parentheses):    Left Ventricle:      Normal - Men Normal - Women  LV EF (MOD BP): 12 %    (>55%)    SPECTRAL DOPPLER ANALYSIS:    Tricuspid Valve and PA/RV Systolic Pressure: TR Max Velocity: RA   Pressure: 3 mmHg RVSP/PASP:      ---------------------------------------------------------------------------  -----  FINDINGS:    Left Ventricle:  Left ventricular systolic function is severely reduced with a calculated   ejection fraction of 12% with regional wall motion abnormalities.    Right Ventricle:  Right ventricular systolic function is moderately reduced. A device lead   is noted in the right heart.    LV Wall Scoring:  The entire inferior wall and mid inferolateral segment are dyskinetic. The  entire septum, entire anterior wall, basal and mid anterolateral wall,   basal  inferolateral segment, and apical lateral segment are akinetic.    < end of copied text >        < from: Cardiac Catheterization (22 @ 11:51) >  Procedures Performed     Procedures:              1.    Stephen Access -R internal jugular         2.    Right Heart Catheterization     3.    [place] Serafin Flow Directed Cath         Indications:               Congestive heart failure, acute on chronic    systolic    Congestive heart failure with cardiomyopathy         Lab Visit Indication:      cardiomyopathy, LV dysfunction         Diagnostic Conclusions:         Right heart catheterization demonstrates cardiogenic shock with    combined pre and post capillary pHTN    - RA: 12 mmHg (V-wave to 16 mmHg)     - RV: 72/12 mmHg     - PA: 70/40 (50) mmHg     - PAOP: 22 mmHg     - AOsat 95%, PAsat 47%, Hgb 15     - Milana CO 2.3 L/min, CI 1.3 L/min/m    2      - TPG 28 mmHg, PVR 12 PERSAUD     - /70 (83), SVR 2469 dynes     -  0.4         RIJ introducer sheath and SGC sutured in place.     Recommendations:         Transfer to [facility] for advanced heart failure evaluation    (MCS/LVAD/transplant evaluation).      < end of copied text >

## 2022-05-27 NOTE — CONSULT NOTE ADULT - SUBJECTIVE AND OBJECTIVE BOX
HPI:  66M Mixed Ischemic/NICM Cardiomyopathy (EF 25-30% at baseline, s/p BIV-ICD), CAD (medically managed MIs in ,, Most recent stent in 2022 to mLAD) presented with multiple near syncope, found to have 41 episodes of VT and admitted initially to cardiac telemetry for further evaluation/management. Ischemic evaluation without new disease and VT ablation without substrate to complete ablation.   Transferred from [facility] for further management and evaluation for LVAD vs OHT.      CT Surgery consulted for LVAD/ OHT evaluation     Past Medical History  AV block    Essential hypertension    Pure hypercholesterolemia    Myocardial infarct, old    Chronic HFrEF (heart failure with reduced ejection fraction)    Chronic kidney disease, unspecified CKD stage    CAD (coronary artery disease)    HLD (hyperlipidemia)    Type 2 diabetes mellitus        Past Surgical History  Artificial cardiac pacemaker        MEDICATIONS  (STANDING):  allopurinol 75 milliGRAM(s) Oral daily  aMIOdarone    Tablet 400 milliGRAM(s) Oral two times a day  aspirin enteric coated 81 milliGRAM(s) Oral daily  atorvastatin 80 milliGRAM(s) Oral at bedtime  chlorhexidine 4% Liquid 1 Application(s) Topical <User Schedule>  clopidogrel Tablet 75 milliGRAM(s) Oral daily  dextrose 5%. 1000 milliLiter(s) (100 mL/Hr) IV Continuous <Continuous>  dextrose 5%. 1000 milliLiter(s) (50 mL/Hr) IV Continuous <Continuous>  dextrose 50% Injectable 25 Gram(s) IV Push once  dextrose 50% Injectable 12.5 Gram(s) IV Push once  dextrose 50% Injectable 25 Gram(s) IV Push once  glucagon  Injectable 1 milliGRAM(s) IntraMuscular once  heparin   Injectable 5000 Unit(s) SubCutaneous every 8 hours  influenza   Vaccine 0.5 milliLiter(s) IntraMuscular once  insulin lispro (ADMELOG) corrective regimen sliding scale   SubCutaneous three times a day before meals  insulin lispro (ADMELOG) corrective regimen sliding scale   SubCutaneous at bedtime  milrinone Infusion 0.375 MICROgram(s)/kG/Min (8.27 mL/Hr) IV Continuous <Continuous>  pantoprazole    Tablet 40 milliGRAM(s) Oral before breakfast  polyethylene glycol 3350 17 Gram(s) Oral daily  senna 2 Tablet(s) Oral at bedtime    MEDICATIONS  (PRN):  dextrose Oral Gel 15 Gram(s) Oral once PRN Blood Glucose LESS THAN 70 milliGRAM(s)/deciliter      Vital Signs Last 24 Hrs  T(C): 36.6 (22 @ 16:00), Max: 36.8 (22 @ 20:00)  T(F): 97.9 (22 @ 16:00), Max: 98.2 (22 @ 20:00)  HR: 69 (22 @ 16:00) (69 - 69)  BP: 99/74 (22 @ 21:00) (99/74 - 106/77)  RR: 25 (22 @ 16:00) (18 - 31)  SpO2: 98% (22 @ 16:00) (91% - 98%)           Daily Height in cm: 177.8 (26 May 2022 19:00)    Daily Weight in k.2 (27 May 2022 05:00)  Admit Wt: Drug Dosing Weight  Height (cm): 177.8 (26 May 2022 19:00)  Weight (kg): 73.5 (26 May 2022 19:00)  BMI (kg/m2): 23.3 (26 May 2022 19:00)  BSA (m2): 1.91 (26 May 2022 19:00)    Allergies: No Known Allergies      Relevant Family History  FAMILY HISTORY:  Family history of acute myocardial infarction (Father)  72        Review of Systems  GENERAL:  no weakness, fatigue, fevers or chills  NEURO: no dizziness, numbness, tingling or weakness  SKIN: no itching, burning, rashes, or lesions   HEENT: no visual changes;  no headache, no vertigo, no recent colds  RESPIRATORY: no shortness of breath, no cough, sputum, wheezing, hemoptysis;   CARDIOVASCULAR:  no chest pain,  or palpitations  GI: no abd pain. no N/V/D.  PERIPHERAL VASCULAR: no swelling, no tenderness, no erythema, no varicose veins.     PHYSICAL EXAM  General: Well nourished, well developed, NAD.                                              Neuro: Normal exam oriented to person/place & time with no focal motor or sensory  deficits.                    Eyes: Normal exam of conjunctiva & lids, pupils equally reactive.   ENT: Normal exam of nasal/oral mucosa with absence of cyanosis.   Neck: Normal exam of jugular veins, trachea & thyroid.   Chest: Normal lung exam with good air movement absence of wheezes, rales, or rhonchi:                                                                          CV:  Auscultation: normal S1S2, Irregular   Murmurs   Carotids: No Bruits[ ]  Abdominal Aorta: normal [ ] nonpalpable[ ]                                                                         GI: Normal exam of abdomen with no noted masses or tenderness. +[id number]                                                                                            Extremities: Normal no evidence of cyanosis or deformity, Edema:   Lower Extremity Pulses: Right[ ] Left[ ]Varicosities[ ]  SKIN : Normal exam to inspection & palpation.                                                           LABS:                        14.2   9.99  )-----------( 176      ( 27 May 2022 05:04 )             41.9     05-    133<L>  |  99  |  48<H>  ----------------------------<  139<H>  4.3   |  21<L>  |  1.79<H>    Ca    8.6      27 May 2022 05:04  Phos  2.8       Mg     1.9         TPro  6.5  /  Alb  3.1<L>  /  TBili  1.2  /  DBili  x   /  AST  26  /  ALT  13  /  AlkPhos  58      PT/INR - ( 26 May 2022 20:22 )   PT: 13.8 sec;   INR: 1.20 ratio         PTT - ( 26 May 2022 20:22 )  PTT:30.3 sec      Cardiac Cath:  < from: Cardiac Catheterization (22 @ 17:45) >  Procedures Performed     Procedures:               1.    Art Access - R radial artery         2.    Left Coronary Angio     3.    Right Coronary Angio     4.    Hemostasis with Radial Band         Indications:                Sustained Ventricular Tachycardia     Lab Visit Indication:      cardiac arrhythmia         Diagnostic Conclusions:         LM: normal     LAD: pLAD mild disease, mLAD patent stent, slow flow     D1: oD1 40-50% stenosis (unchanged from prior study),      LCx: mild disease      OM1: mild disease     RCA: mild disease     RPDA: mild disease         Radial band placed on Right Radial access site with adequate    hemostasis prior to leaving the cath lab    < end of copied text >        TTE / SOM:  < from: TTE Limited Echo w/o Cont (22 @ 10:31) >  CONCLUSIONS:     1. Limited study obtained for evaluation of left ventricular function.   2. Left ventricular systolic function is severely reduced with a   calculated ejection fraction of 12% with regional wall motion   abnormalities.   3. Multiple segmental abnormalities exist. See findings.   4. Right ventricular systolic function is moderately reduced. A device   lead is noted in the right heart.   5. No pericardial effusion.    < end of copied text >

## 2022-05-27 NOTE — CONSULT NOTE ADULT - PROBLEM SELECTOR RECOMMENDATION 6
- upon arrival was noted to have rise in renal function, likely related to low cardiac output  - discontinue aldactone as stated above  - continue to monitor at this time - upon arrival was noted to have JAGRUTI, likely related to low cardiac output  - discontinue aldactone as stated above  - continue to monitor at this time

## 2022-05-27 NOTE — PROGRESS NOTE ADULT - ASSESSMENT
Assessment and Plan    67 y/o male w/ PMHx HTN, HLD, type 2 DM, chronic HFrEF (EF 25-30% by Echo), complete heart block s/p PPM (in 2006, upgraded to BiV-ICD in 09/2016), CAD (s/p recent BERNABE mid LAD at [facility] on 04/18/2022), and stage II CKD (baseline Cr ~1.3) admitted for intermittent vtach, now s/p unsuccessful VT ablation. Transferred to [facility] CICU for LVAD/OHT eval.     Plan  Neuro   - AAOx3  - No active issues    Respiratory  - No active issues  - comfortable on room air    Cardiovascular  #Cardiogenic shock   - central sat 35%, started on milrinone .125  - repeat labs: central sat 43%--> increased milrinone 0.25  - repeat labs: central sat 65.5%, CO 4.0, CI 2.1   - discussed with HF and Dr Leonardo       #VTach s/p EPS on 5/24, unable to perform ablation as no substrate found and did not induce VT because of severely low EF   - Interrogation of ICD @Dr Wilson's office 5/20: back-to-back episodes of VT @ 200+ bpm all terminating with ATP. Since last cath pt has had 41 VT episodes (all falling into VF zone)  - Normal device function. BIV pacing 97%. No programming changes made  -. c/w Amiodarone 400 mg PO BID load  - switched coreg to toprol XL 25mg BID       #Acute on chronic HFrEF (heart failure with reduced ejection fraction).   - ECHO 5/21/22 with LVEF 10-15% (reduced from 25-30% 3/2022)  - Received lasix 40 IVP x2 since 5/24 arrival on CCU with 1.8L urine output   - c/w  spironolactone 25mg daily  - Hold entresto at this time in setting of soft pressures  - Hold diuretics in setting of soft pressures and patient euvolemic   - will start farxiga on discharge     #CAD (coronary artery disease).    Chest pain free and compliant with DAPT since last cath 4/18/22  - Trop flat 0.04 x2 CK, D-dimer normal -184  - Cardiac cath @[facility] 4/18/22: mLAD 90% s/p BERNABE, LCx mild disease, RCA mild disease.  - ECHO (03/2022, per MD note): mild LV dilation, EF 25-30%, mild RV dilation, trace AI/MR.   - ECHO 5/21/22: LVEF 10-15%, abnormal septal motion seen due to RV pacing, remaining segments demonstrate severe hypokinesis, D- shaped flattening of the interventricular septum due to RV pressure overload, normal RV size, reduced RVSF, dilated RA, mild AI/AS, pulm HTN (PASP 51 mmHg), mildly dilated ascending aorta, based on TTE 2016 the LVEF is lower is RV function is now reduced.   - s/p diagnostic cardiac cath w/ Dr Wagner on 05/23/2022   Plan:  - c/w ASA 81 mg PO QD  - c/w Plavix 75 mg PO QD  - c/w Atorvastatin 80 mg QHS    #Hyperlipidemia.   - , , HDL 29, LDL 84  - Home Atorvastatin increased to 80 mg PO qHS    GI  - No active issues    Renal  #JAGRUTI (acute kidney injury) CKD II  baseline Cr ~1.3  - monitor CR   - Avoid nephrotoxic agents, NSAIDs. Renally dose meds.    Heme/Onc  No active issues    Endo  - T2DM  - c/w ISS    ID  - no active issues   - afebrile, wbc 11.27  - continue to monitor wbc and fever curve   - monitor off abx for now Assessment   67 y/o male w/ PMHx HTN, HLD, type 2 DM, chronic HFrEF (EF 25-30% by Echo), complete heart block s/p PPM (in 2006, upgraded to BiV-ICD in 09/2016), CAD (s/p recent BERNABE mid LAD at [facility] on 04/18/2022), and stage II CKD (baseline Cr ~1.3) admitted for intermittent vtach, now s/p unsuccessful VT ablation. Transferred to [facility] CICU for LVAD/OHT eval.     Plan  # Neuro   - AAOx3  - No active issues    # Respiratory: Denies SOB or dyspnea O2 sat?92% on NC2L   - No active issues  - Oxygen supplement as needed     # Cardiovascular  Cardiogenic shock   - central sat 35%, started on milrinone .125  - repeat labs: central sat 43%--> increased milrinone 0.25  Currently on Milrinone@0.25 w/ numbers discussed above  - RHC today and will determine for mechanical support and swan placement in the lab     VTach s/p EPS on 5/24, unable to perform ablation as no substrate found and did not induce VT because of severely low EF   - Interrogation of ICD @Dr Wilson's office 5/20: back-to-back episodes of VT @ 200+ bpm all terminating with ATP. Since last cath pt has had 41 VT episodes (all falling into VF zone)  - Normal device function. BIV pacing 97%. No programming changes made  -. c/w Amiodarone 400 mg PO BID load  - Cont. Toprol XL 25mg BID     Acute on chronic HFrEF (heart failure with reduced ejection fraction).   - ECHO 5/21/22 with LVEF 10-15% (reduced from 25-30% 3/2022)  - Received lasix 40 IVP x2 since 5/24 arrival on CCU with 1.8L urine output   - c/w  spironolactone 25mg daily  - Hold entresto at this time in setting of soft pressures  - Hold diuretics in setting of soft pressures and patient euvolemic   - will start farxiga on discharge     CAD (coronary artery disease).    Chest pain free and compliant with DAPT since last cath 4/18/22  - Trop flat 0.04 x2 CK, D-dimer normal -184  - Cardiac cath @[facility] 4/18/22: mLAD 90% s/p BERNABE, LCx mild disease, RCA mild disease.  - ECHO (03/2022, per MD note): mild LV dilation, EF 25-30%, mild RV dilation, trace AI/MR.   - ECHO 5/21/22: LVEF 10-15%, abnormal septal motion seen due to RV pacing, remaining segments demonstrate severe hypokinesis, D- shaped flattening of the interventricular septum due to RV pressure overload, normal RV size, reduced RVSF, dilated RA, mild AI/AS, pulm HTN (PASP 51 mmHg), mildly dilated ascending aorta, based on TTE 2016 the LVEF is lower is RV function is now reduced.   - s/p diagnostic cardiac cath w/ Dr Wagner on 05/23/2022   Plan:  - c/w ASA 81 mg PO QD  - c/w Plavix 75 mg PO QD  - c/w Atorvastatin 80 mg QHS    #Hyperlipidemia.   - , , HDL 29, LDL 84  - Home Atorvastatin increased to 80 mg PO qHS    #GI  - No active issues  - DASH diet w/ 1500ml Fluid restriction  - Cont. PPI     # Renal: JAGRUTI (acute kidney injury) CKD II  Admitted w/ Cr 2.01 (baseline Cr ~1.3) now trending down   IN: 529.7 mL / OUT: 700 mL / NET: -170.3 mL  - Avoid nephrotoxic agents, NSAIDs. Renally dose meds.    # Heme/Onc  - No active issues    # Endo HX OF T2DM (A1C 6.2 on admission)  F/S 127-132  - Cont. ISS     # ID: Pt remains afebrile and No leukocytosis   - continue to monitor wbc and fever curve   - monitor off abx for now Assessment   67 y/o male w/ PMHx HTN, HLD, type 2 DM, chronic HFrEF (EF 25-30% by Echo), complete heart block s/p PPM (in 2006, upgraded to BiV-ICD in 09/2016), CAD (s/p recent BERNABE mid LAD at [facility] on 04/18/2022), and stage II CKD (baseline Cr ~1.3) admitted for intermittent vtach, now s/p unsuccessful VT ablation. Transferred to [facility] CICU for LVAD/OHT eval.     Plan  # Neuro   - AAOx3  - No active issues    # Respiratory: Denies SOB or dyspnea O2 sat?92% on NC2L   - No active issues  - Oxygen supplement as needed     # Cardiovascular  Cardiogenic shock   - central sat 35%, started on milrinone .125  - repeat labs: central sat 43%--> increased milrinone 0.25  Currently on Milrinone@0.25 w/ numbers discussed above  - RHC today and will determine for mechanical support or further inotrop support     VTach s/p EPS on 5/24, unable to perform ablation as no substrate found and did not induce VT because of severely low EF   - Interrogation of ICD @Dr Wilson's office 5/20: back-to-back episodes of VT @ 200+ bpm all terminating with ATP. Since last cath pt has had 41 VT episodes (all falling into VF zone)  - Normal device function. BIV pacing 97%. No programming changes made  -. c/w Amiodarone 400 mg PO BID load  - Cont. Toprol XL 25mg BID     Acute on chronic HFrEF (heart failure with reduced ejection fraction).   - ECHO 5/21/22 with LVEF 10-15% (reduced from 25-30% 3/2022)  - Received lasix 40 IVP x2 since 5/24 arrival on CCU with 1.8L urine output   - D/C Spironolactone for now     CAD (coronary artery disease).    Chest pain free and compliant with DAPT since last cath 4/18/22  - Trop flat 0.04 x2 CK, D-dimer normal -184  - Cardiac cath @[facility] 4/18/22: mLAD 90% s/p BERNABE, LCx mild disease, RCA mild disease s/p diagnostic cardiac cath w/ Dr Wagner on 05/23/2022   - ECHO (03/2022, per MD note): mild LV dilation, EF 25-30%, mild RV dilation, trace AI/MR.   - ECHO 5/21/22: LVEF 10-15%, abnormal septal motion seen due to RV pacing, remaining segments demonstrate severe hypokinesis, D- shaped flattening of the interventricular septum due to RV pressure overload, normal RV size, reduced RVSF, dilated RA, mild AI/AS, pulm HTN (PASP 51 mmHg), mildly dilated ascending aorta, based on TTE 2016 the LVEF is lower is RV function is now reduced.   - Cont ASA and Lipitor   - Discuss about Plavix if pt needs advance therapy     #Hyperlipidemia.   - , , HDL 29, LDL 84  - Home Atorvastatin increased to 80 mg PO qHS    #GI  - No active issues  - DASH diet w/ 1500ml Fluid restriction  - Cont. PPI     # Renal: JAGRUTI (acute kidney injury) CKD II  Admitted w/ Cr 2.01 (baseline Cr ~1.3) now trending down   IN: 529.7 mL / OUT: 700 mL / NET: -170.3 mL  - Avoid nephrotoxic agents, NSAIDs. Renally dose meds.    # Heme/Onc  - No active issues    # Endo HX OF T2DM (A1C 6.2 on admission)  F/S 127-132  - Cont. ISS     # ID: Pt remains afebrile and No leukocytosis   - continue to monitor wbc and fever curve   - monitor off abx for now

## 2022-05-27 NOTE — PROGRESS NOTE ADULT - SUBJECTIVE AND OBJECTIVE BOX
Heart Transplant Evaluation Consent  Met with patient Mr. Lennox Gocool or approximately 45 minutes to discuss consent for heart transplant evaluation.  Reviewed evaluation process including testing, labs, appointments, and consults with the transplant team.  Discussed surgical, medical, and psycho-social risks and benefits, selection process, [facility] waitlist information, and follow-up care.  Patient questions were answered.  Consent for increased risk donor was reviewed.  Patient signed consents and was supplied copies of consents, patient handbook, [facility] " Questions and Answers for transplant candidates regarding multiple listing and wait time transfer", along with contact phone number if they have any additional questions or needs.  Patient is aware we are available should they or their family have any additional questions or concerns.    Maricruz Noonan NP  Heart Transplant Coordinator  [phone]

## 2022-05-27 NOTE — CONSULT NOTE ADULT - SUBJECTIVE AND OBJECTIVE BOX
Patient is a 66y old  Male who presents with a chief complaint of LVAD/OHT eval (27 May 2022 21:12)    HPI:  66M Mixed Ischemic/NICM Cardiomyopathy (EF 25-30% at baseline, s/p BIV-ICD), CAD (medically managed MIs in 2008,2011, Most recent stent in April 2022 to mLAD) presented with multiple near syncope, found to have 41 episodes of VT and admitted initially to cardiac telemetry for further evaluation/management. Ischemic evaluation without new disease and VT ablation without substrate to complete ablation.   Transferred from [facility] for further management and evaluation for LVAD vs OHT.    (26 May 2022 20:31)    PAST MEDICAL & SURGICAL HISTORY:  AV block  Essential hypertension  Chronic HFrEF (heart failure with reduced ejection fraction)  Chronic kidney disease, unspecified CKD stage  CAD (coronary artery disease)  HLD (hyperlipidemia)  Type 2 diabetes mellitus  Artificial cardiac pacemaker    MEDICATIONS  (STANDING):  allopurinol 75 milliGRAM(s) Oral daily  aMIOdarone    Tablet 400 milliGRAM(s) Oral two times a day  aspirin enteric coated 81 milliGRAM(s) Oral daily  atorvastatin 80 milliGRAM(s) Oral at bedtime  chlorhexidine 4% Liquid 1 Application(s) Topical <User Schedule>  clopidogrel Tablet 75 milliGRAM(s) Oral daily  dextrose 5%. 1000 milliLiter(s) (100 mL/Hr) IV Continuous <Continuous>  dextrose 5%. 1000 milliLiter(s) (50 mL/Hr) IV Continuous <Continuous>  dextrose 50% Injectable 25 Gram(s) IV Push once  dextrose 50% Injectable 12.5 Gram(s) IV Push once  dextrose 50% Injectable 25 Gram(s) IV Push once  glucagon  Injectable 1 milliGRAM(s) IntraMuscular once  heparin   Injectable 5000 Unit(s) SubCutaneous every 8 hours  influenza   Vaccine 0.5 milliLiter(s) IntraMuscular once  insulin lispro (ADMELOG) corrective regimen sliding scale   SubCutaneous three times a day before meals  insulin lispro (ADMELOG) corrective regimen sliding scale   SubCutaneous at bedtime  milrinone Infusion 0.375 MICROgram(s)/kG/Min (8.27 mL/Hr) IV Continuous <Continuous>  nitroprusside Infusion 0.25 MICROgram(s)/kG/Min (2.76 mL/Hr) IV Continuous <Continuous>  pantoprazole    Tablet 40 milliGRAM(s) Oral before breakfast  polyethylene glycol 3350 17 Gram(s) Oral daily  senna 2 Tablet(s) Oral at bedtime    MEDICATIONS  (PRN):  dextrose Oral Gel 15 Gram(s) Oral once PRN Blood Glucose LESS THAN 70 milliGRAM(s)/deciliter    Allergies  No Known Allergies    Intolerances    FAMILY HISTORY:  Family history of acute myocardial infarction (Father)  74    Vital Signs Last 24 Hrs  T(C): 36.9 (27 May 2022 19:45), Max: 36.9 (27 May 2022 19:45)  T(F): 98.4 (27 May 2022 19:45), Max: 98.4 (27 May 2022 19:45)  HR: 80 (27 May 2022 21:00) (69 - 80)  BP: --  BP(mean): --  RR: 24 (27 May 2022 21:00) (16 - 31)  SpO2: 98% (27 May 2022 21:00) (91% - 98%)    EOE:  Mandible FROM             Facial bones and MOM grossly intact             ( - ) trismus             ( - ) LAD             ( - ) swelling             ( - ) asymmetry             ( - ) palpation             ( - ) SOB             ( - ) dysphagia             ( - ) LOC    IOE:  permanent dentition: multiple missing teeth           hard/soft palate:  ( - ) palatal torus           tongue/FOM WNL           labial/buccal mucosa WNL           ( - ) percussion           ( - ) palpation           ( - ) swelling      LABS:                        14.2   9.99  )-----------( 176      ( 27 May 2022 05:04 )             41.9     05-27    133<L>  |  99  |  48<H>  ----------------------------<  139<H>  4.3   |  21<L>  |  1.79<H>    Ca    8.6      27 May 2022 05:04  Phos  2.8     05-27  Mg     1.9     05-27    TPro  6.5  /  Alb  3.1<L>  /  TBili  1.2  /  DBili  x   /  AST  26  /  ALT  13  /  AlkPhos  58  05-27    WBC Count: 9.99 K/uL [3.80 - 10.50] (05-27 @ 05:04)  Platelet Count - Automated: 176 K/uL [150 - 400] (05-27 @ 05:04)  WBC Count: 11.27 K/uL *H* [3.80 - 10.50] (05-26 @ 20:22)  Platelet Count - Automated: 241 K/uL [150 - 400] (05-26 @ 20:22)  INR: 1.20 ratio *H* [0.88 - 1.16] (05-26 @ 20:22)  WBC Count: 9.60 K/uL [3.80 - 10.50] (05-26 @ 03:31)  Platelet Count - Automated: 201 K/uL [150 - 400] (05-26 @ 03:31)  INR: 1.18 *H* [0.88 - 1.16] (05-26 @ 03:31)  WBC Count: 8.14 K/uL [3.80 - 10.50] (05-25 @ 04:34)  Platelet Count - Automated: 189 K/uL [150 - 400] (05-25 @ 04:34)    *DENTAL RADIOGRAPHS: Patient unable to be transported for radiographs    ASSESSMENT: Extraoral and intraoral exam reveal no abnormalities. Patient is missing mandibular molars. There is no swelling, no pain on palpation, no pain on percussion. There are no signs of acute odontogenic infection based on clinical exam.     PROCEDURE:  Verbal consent given.     RECOMMENDATIONS:  1) Dental F/U with outpatient dentist for comprehensive dental care.     Elida Manzano DDS #[id number]

## 2022-05-27 NOTE — CONSULT NOTE ADULT - TIME BILLING
Evaluating, counseling and discussing his candidacy for advanced surgical therapies for his heart failure.

## 2022-05-27 NOTE — PROGRESS NOTE ADULT - SUBJECTIVE AND OBJECTIVE BOX
GOCOOL, LENNOX  MRN-[id number]  Patient is a 66y old  Male who presents with a chief complaint of LVAD/OHT eval (27 May 2022 16:21)    HPI:  66M Mixed Ischemic/NICM Cardiomyopathy (EF 25-30% at baseline, s/p BIV-ICD), CAD (medically managed MIs in 2008,2011, Most recent stent in April 2022 to mLAD) presented with multiple near syncope, found to have 41 episodes of VT and admitted initially to cardiac telemetry for further evaluation/management. Ischemic evaluation without new disease and VT ablation without substrate to complete ablation.   Transferred from [facility] for further management and evaluation for LVAD vs OHT.    (26 May 2022 20:31)      Hospital Course:  5/26 Transferred to CCU for further management    24 HOUR EVENTS:    REVIEW OF SYSTEMS:    CONSTITUTIONAL: No weakness, fevers or chills  EYES/ENT: No visual changes;  No vertigo or throat pain   NECK: No pain or stiffness  RESPIRATORY: No cough, wheezing, hemoptysis; No shortness of breath  CARDIOVASCULAR: No chest pain or palpitations  GASTROINTESTINAL: No abdominal or epigastric pain. No nausea, vomiting, or hematemesis; No diarrhea or constipation. No melena or hematochezia.  GENITOURINARY: No dysuria, frequency or hematuria  NEUROLOGICAL: No numbness or weakness  SKIN: No itching, rashes      ICU Vital Signs Last 24 Hrs  T(C): 36.9 (27 May 2022 19:45), Max: 36.9 (27 May 2022 19:45)  T(F): 98.4 (27 May 2022 19:45), Max: 98.4 (27 May 2022 19:45)  HR: 80 (27 May 2022 21:00) (69 - 80)  BP: --  BP(mean): --  ABP: 102/63 (27 May 2022 21:00) (90/57 - 124/79)  ABP(mean): 75 (27 May 2022 21:00) (68 - 94)  RR: 24 (27 May 2022 21:00) (16 - 31)  SpO2: 98% (27 May 2022 21:00) (91% - 98%)      CVP(mm Hg): 5 (05-27-22 @ 21:00) (-1 - 69)  CO: 3.1 (05-27-22 @ 15:00) (3.1 - 3.1)  CI: 1.6 (05-27-22 @ 15:00) (1.6 - 1.6)  PA: 71/30 (05-27-22 @ 21:00) (43/17 - 84/39)  PA(mean): 44 (05-27-22 @ 21:00) (25 - 55)  PA(direct): --  PCWP: --  LA: --  RA: --  SVR: 1804 (05-27-22 @ 15:00) (1804 - 1804)  SVRI: 3495 (05-27-22 @ 15:00) (3495 - 3495)  PVR: --  PVRI: --  I&O's Summary    26 May 2022 07:01  -  27 May 2022 07:00  --------------------------------------------------------  IN: 535.2 mL / OUT: 700 mL / NET: -164.8 mL    27 May 2022 07:01  -  27 May 2022 21:13  --------------------------------------------------------  IN: 680.1 mL / OUT: 700 mL / NET: -19.9 mL        CAPILLARY BLOOD GLUCOSE    CAPILLARY BLOOD GLUCOSE      POCT Blood Glucose.: 129 mg/dL (27 May 2022 17:32)      PHYSICAL EXAM:  GENERAL: No acute distress, well-developed  HEAD:  Atraumatic, Normocephalic  EYES: EOMI, PERRLA, conjunctiva and sclera clear  NECK: Supple, no lymphadenopathy, no JVD  CHEST/LUNG: CTAB; No wheezes, rales, or rhonchi  HEART: Regular rate and rhythm. Normal S1/S2. No murmurs, rubs, or gallops  ABDOMEN: Soft, non-tender, non-distended; normal bowel sounds, no organomegaly  EXTREMITIES:  2+ peripheral pulses b/l, No clubbing, cyanosis, or edema  NEUROLOGY: A&O x 3, no focal deficits  SKIN: No rashes or lesions    ============================I/O===========================   I&O's Detail    26 May 2022 07:01  -  27 May 2022 07:00  --------------------------------------------------------  IN:    Milrinone: 44 mL    Milrinone: 11.2 mL    Oral Fluid: 480 mL  Total IN: 535.2 mL    OUT:    Voided (mL): 700 mL  Total OUT: 700 mL    Total NET: -164.8 mL      27 May 2022 07:01  -  27 May 2022 21:13  --------------------------------------------------------  IN:    Milrinone: 27.5 mL    Milrinone: 49.8 mL    Nitroprusside: 2.8 mL    Oral Fluid: 600 mL  Total IN: 680.1 mL    OUT:    Voided (mL): 700 mL  Total OUT: 700 mL    Total NET: -19.9 mL        ============================ LABS =========================                        14.2   9.99  )-----------( 176      ( 27 May 2022 05:04 )             41.9     05-27    133<L>  |  99  |  48<H>  ----------------------------<  139<H>  4.3   |  21<L>  |  1.79<H>    Ca    8.6      27 May 2022 05:04  Phos  2.8     05-27  Mg     1.9     05-27    TPro  6.5  /  Alb  3.1<L>  /  TBili  1.2  /  DBili  x   /  AST  26  /  ALT  13  /  AlkPhos  58  05-27    Troponin T, High Sensitivity Result: 93 ng/L (05-26-22 @ 20:22)    CKMB Units: 2.2 ng/mL (05-26-22 @ 20:22)    Creatine Kinase, Serum: 210 U/L (05-26-22 @ 20:22)    CPK Mass Assay %: 1.0 % (05-26-22 @ 20:22)        LIVER FUNCTIONS - ( 27 May 2022 05:04 )  Alb: 3.1 g/dL / Pro: 6.5 g/dL / ALK PHOS: 58 U/L / ALT: 13 U/L / AST: 26 U/L / GGT: x           PT/INR - ( 26 May 2022 20:22 )   PT: 13.8 sec;   INR: 1.20 ratio         PTT - ( 26 May 2022 20:22 )  PTT:30.3 sec  ABG - ( 27 May 2022 14:35 )  pH, Arterial: 7.43  pH, Blood: x     /  pCO2: 35    /  pO2: 81    / HCO3: 23    / Base Excess: -0.6  /  SaO2: 96.4              Blood Gas Arterial, Lactate: 1.3 mmol/L (05-27-22 @ 14:35)  Blood Gas Arterial, Lactate: 1.0 mmol/L (05-27-22 @ 05:00)  Blood Gas Venous - Lactate: 1.0 mmol/L (05-27-22 @ 05:00)  Blood Gas Venous - Lactate: 0.9 mmol/L (05-27-22 @ 00:15)  Blood Gas Arterial, Lactate: 1.3 mmol/L (05-26-22 @ 22:05)  Blood Gas Venous - Lactate: 1.2 mmol/L (05-26-22 @ 22:05)  Blood Gas Venous - Lactate: 1.7 mmol/L (05-26-22 @ 20:40)  Blood Gas Venous - Lactate: 1.7 mmol/L (05-26-22 @ 20:20)  Blood Gas Arterial, Lactate: 1.6 mmol/L (05-26-22 @ 20:15)  Lactate, Blood: 1.1 mmol/L (05-25-22 @ 04:34)      ======================Micro/Rad/Cardio=================  Telemtry: Reviewed   EKG: Reviewed  CXR: Reviewed  Culture: Reviewed   Echo:   Cath:   ======================================================  PAST MEDICAL & SURGICAL HISTORY:  AV block      Essential hypertension      Chronic HFrEF (heart failure with reduced ejection fraction)      Chronic kidney disease, unspecified CKD stage      CAD (coronary artery disease)      HLD (hyperlipidemia)      Type 2 diabetes mellitus      Artificial cardiac pacemaker        ====================ASSESSMENT ==============  67 y/o male w/ PMHx HTN, HLD, type 2 DM, chronic HFrEF (EF 25-30% by Echo), complete heart block s/p PPM (in 2006, upgraded to BiV-ICD in 09/2016), CAD (s/p recent BERNABE mid LAD at [facility] on 04/18/2022), and stage II CKD (baseline Cr ~1.3) admitted for intermittent vtach, now s/p unsuccessful VT ablation. Transferred to [facility] CICU for LVAD/OHT eval.                 Plan:  ====================== NEUROLOGY=====================  Neuro   - AAOx3  - No active issues  ==================== RESPIRATORY======================   Respiratory: Denies SOB or dyspnea; RA spO2 98%   - No active issues  - Oxygen supplement as needed       ====================CARDIOVASCULAR==================  Cardiogenic shock   - central sat 35%, started on milrinone .125  - repeat labs: central sat 43%--> increased milrinone 0.25  Currently on Milrinone@0.25 w/ numbers discussed above  - RHC today and will determine for mechanical support or further inotrop support     VTach s/p EPS on 5/24, unable to perform ablation as no substrate found and did not induce VT because of severely low EF   - Interrogation of ICD @Dr Wilson's office 5/20: back-to-back episodes of VT @ 200+ bpm all terminating with ATP. Since last cath pt has had 41 VT episodes (all falling into VF zone)  - Normal device function. BIV pacing 97%. No programming changes made  -. c/w Amiodarone 400 mg PO BID load  - Cont. Toprol XL 25mg BID     Acute on chronic HFrEF (heart failure with reduced ejection fraction).   - ECHO 5/21/22 with LVEF 10-15% (reduced from 25-30% 3/2022)  - Received lasix 40 IVP x2 since 5/24 arrival on CCU with 1.8L urine output   - D/C Spironolactone for now     CAD (coronary artery disease).    Chest pain free and compliant with DAPT since last cath 4/18/22  - Trop flat 0.04 x2 CK, D-dimer normal -184  - Cardiac cath @[facility] 4/18/22: mLAD 90% s/p BERNABE, LCx mild disease, RCA mild disease s/p diagnostic cardiac cath w/ Dr Wagner on 05/23/2022   - ECHO (03/2022, per MD note): mild LV dilation, EF 25-30%, mild RV dilation, trace AI/MR.   - ECHO 5/21/22: LVEF 10-15%, abnormal septal motion seen due to RV pacing, remaining segments demonstrate severe hypokinesis, D- shaped flattening of the interventricular septum due to RV pressure overload, normal RV size, reduced RVSF, dilated RA, mild AI/AS, pulm HTN (PASP 51 mmHg), mildly dilated ascending aorta, based on TTE 2016 the LVEF is lower is RV function is now reduced.   - Cont ASA and Lipitor   - Plavix for advance therapy     Hyperlipidemia.   - , , HDL 29, LDL 84  - Home Atorvastatin increased to 80 mg PO qHS      aspirin enteric coated 81 milliGRAM(s) Oral daily  atorvastatin 80 milliGRAM(s) Oral at bedtime  clopidogrel Tablet 75 milliGRAM(s) Oral daily  aMIOdarone    Tablet 400 milliGRAM(s) Oral two times a day  milrinone Infusion 0.375 MICROgram(s)/kG/Min (8.27 mL/Hr) IV Continuous <Continuous>  nitroprusside Infusion 0.25 MICROgram(s)/kG/Min (2.76 mL/Hr) IV Continuous <Continuous>    ===================HEMATOLOGIC/ONC ===================  - Monitor H/H & PLTs  - Continue Heparin for venous thromboembolism prophylaxis.      heparin   Injectable 5000 Unit(s) SubCutaneous every 8 hours    ===================== RENAL =========================  JAGRUTI (acute kidney injury) CKD II  Admitted w/ Cr 2.01 (baseline Cr ~1.3) now trending down   IN: 529.7 mL / OUT: 700 mL / NET: -170.3 mL  - Avoid nephrotoxic agents, NSAIDs. Renally dose meds.    ==================== GASTROINTESTINAL===================  No active issues  - DASH diet w/ 1500ml Fluid restriction  - Continue Protonix  for stress ulcer prophylaxis.  - Bowel regimen with Miralax and senna         dextrose 5%. 1000 milliLiter(s) (100 mL/Hr) IV Continuous <Continuous>  dextrose 5%. 1000 milliLiter(s) (50 mL/Hr) IV Continuous <Continuous>  pantoprazole    Tablet 40 milliGRAM(s) Oral before breakfast  polyethylene glycol 3350 17 Gram(s) Oral daily  senna 2 Tablet(s) Oral at bedtime    =======================    ENDOCRINE  =====================  HX OF T2DM (A1C 6.2 on admission)  F/S 127-132  - Cont. ISS    Gout  - c/w allopurinol    allopurinol 75 milliGRAM(s) Oral daily  dextrose 50% Injectable 25 Gram(s) IV Push once  dextrose 50% Injectable 12.5 Gram(s) IV Push once  dextrose 50% Injectable 25 Gram(s) IV Push once  dextrose Oral Gel 15 Gram(s) Oral once PRN Blood Glucose LESS THAN 70 milliGRAM(s)/deciliter  glucagon  Injectable 1 milliGRAM(s) IntraMuscular once  insulin lispro (ADMELOG) corrective regimen sliding scale   SubCutaneous three times a day before meals  insulin lispro (ADMELOG) corrective regimen sliding scale   SubCutaneous at bedtime    ========================INFECTIOUS DISEASE================  Pt remains afebrile and No leukocytosis   - continue to monitor wbc and fever curve   - monitor off abx for now    Patient requires continuous monitoring with bedside rhythm monitoring, pulse ox monitoring, and intermittent blood gas analysis. Care plan discussed with ICU care team. Patient remained critical and at risk for life threatening decompensation.  Patient seen, examined and plan discussed with CCU team during rounds.     I have personally provided ____ minutes of critical care time excluding time spent on separate procedures, in addition to initial critical care time provided by the CICU Attending, Dr. Leonardo    By signing my name below, I, Bear Reagan, attest that this documentation has been prepared under the direction and in the presence of RAMSES Jimenez    Electronically signed: Mini Kirkpatrick, 05-27-22 @ 21:13    I, RAMSES Jimenez, personally performed the services described in this documentation. all medical record entries made by the nelidaibshira were at my direction and in my presence. I have reviewed the chart and agree that the record reflects my personal performance and is accurate and complete  Electronically signed: RAMSES Jimenez         GOCOOL, LENNOX  MRN-[id number]  Patient is a 66y old  Male who presents with a chief complaint of LVAD/OHT eval (27 May 2022 16:21)    HPI:  66M Mixed Ischemic/NICM Cardiomyopathy (EF 25-30% at baseline, s/p BIV-ICD), CAD (medically managed MIs in 2008,2011, Most recent stent in April 2022 to mLAD) presented with multiple near syncope, found to have 41 episodes of VT and admitted initially to cardiac telemetry for further evaluation/management. Ischemic evaluation without new disease and VT ablation without substrate to complete ablation.   Transferred from [facility] for further management and evaluation for LVAD vs OHT.    (26 May 2022 20:31)      Hospital Course:  5/26 Transferred to CCU for further management    24 HOUR EVENTS: no acute events    REVIEW OF SYSTEMS:    CONSTITUTIONAL: No weakness, fevers or chills  EYES/ENT: No visual changes;  No vertigo or throat pain   NECK: No pain or stiffness  RESPIRATORY: No cough, wheezing, hemoptysis; No shortness of breath  CARDIOVASCULAR: No chest pain or palpitations  GASTROINTESTINAL: No abdominal or epigastric pain. No nausea, vomiting, or hematemesis; No diarrhea or constipation. No melena or hematochezia.  GENITOURINARY: No dysuria, frequency or hematuria  NEUROLOGICAL: No numbness or weakness  SKIN: No itching, rashes      ICU Vital Signs Last 24 Hrs  T(C): 36.9 (27 May 2022 19:45), Max: 36.9 (27 May 2022 19:45)  T(F): 98.4 (27 May 2022 19:45), Max: 98.4 (27 May 2022 19:45)  HR: 80 (27 May 2022 21:00) (69 - 80)  ABP: 102/63 (27 May 2022 21:00) (90/57 - 124/79)  ABP(mean): 75 (27 May 2022 21:00) (68 - 94)  RR: 24 (27 May 2022 21:00) (16 - 31)  SpO2: 98% (27 May 2022 21:00) (91% - 98%)      CVP(mm Hg): 5 (05-27-22 @ 21:00) (-1 - 69)  CO: 3.1 (05-27-22 @ 15:00) (3.1 - 3.1)  CI: 1.6 (05-27-22 @ 15:00) (1.6 - 1.6)  PA: 71/30 (05-27-22 @ 21:00) (43/17 - 84/39)  PA(mean): 44 (05-27-22 @ 21:00) (25 - 55)  SVR: 1804 (05-27-22 @ 15:00) (1804 - 1804)  SVRI: 3495 (05-27-22 @ 15:00) (2405 - 6467  I&O's Summary    26 May 2022 07:01  -  27 May 2022 07:00  --------------------------------------------------------  IN: 535.2 mL / OUT: 700 mL / NET: -164.8 mL    27 May 2022 07:01  -  27 May 2022 21:13  --------------------------------------------------------  IN: 680.1 mL / OUT: 700 mL / NET: -19.9 mL        CAPILLARY BLOOD GLUCOSE    CAPILLARY BLOOD GLUCOSE      POCT Blood Glucose.: 129 mg/dL (27 May 2022 17:32)      ============================I/O===========================   I&O's Detail    26 May 2022 07:01  -  27 May 2022 07:00  --------------------------------------------------------  IN:    Milrinone: 44 mL    Milrinone: 11.2 mL    Oral Fluid: 480 mL  Total IN: 535.2 mL    OUT:    Voided (mL): 700 mL  Total OUT: 700 mL    Total NET: -164.8 mL      27 May 2022 07:01  -  27 May 2022 21:13  --------------------------------------------------------  IN:    Milrinone: 27.5 mL    Milrinone: 49.8 mL    Nitroprusside: 2.8 mL    Oral Fluid: 600 mL  Total IN: 680.1 mL    OUT:    Voided (mL): 700 mL  Total OUT: 700 mL    Total NET: -19.9 mL        ============================ LABS =========================                        14.2   9.99  )-----------( 176      ( 27 May 2022 05:04 )             41.9     05-27    133<L>  |  99  |  48<H>  ----------------------------<  139<H>  4.3   |  21<L>  |  1.79<H>    Ca    8.6      27 May 2022 05:04  Phos  2.8     05-27  Mg     1.9     05-27    TPro  6.5  /  Alb  3.1<L>  /  TBili  1.2  /  DBili  x   /  AST  26  /  ALT  13  /  AlkPhos  58  05-27    Troponin T, High Sensitivity Result: 93 ng/L (05-26-22 @ 20:22)    CKMB Units: 2.2 ng/mL (05-26-22 @ 20:22)    Creatine Kinase, Serum: 210 U/L (05-26-22 @ 20:22)    CPK Mass Assay %: 1.0 % (05-26-22 @ 20:22)        LIVER FUNCTIONS - ( 27 May 2022 05:04 )  Alb: 3.1 g/dL / Pro: 6.5 g/dL / ALK PHOS: 58 U/L / ALT: 13 U/L / AST: 26 U/L / GGT: x           PT/INR - ( 26 May 2022 20:22 )   PT: 13.8 sec;   INR: 1.20 ratio         PTT - ( 26 May 2022 20:22 )  PTT:30.3 sec  ABG - ( 27 May 2022 14:35 )  pH, Arterial: 7.43  pH, Blood: x     /  pCO2: 35    /  pO2: 81    / HCO3: 23    / Base Excess: -0.6  /  SaO2: 96.4              Blood Gas Arterial, Lactate: 1.3 mmol/L (05-27-22 @ 14:35)  Blood Gas Arterial, Lactate: 1.0 mmol/L (05-27-22 @ 05:00)  Blood Gas Venous - Lactate: 1.0 mmol/L (05-27-22 @ 05:00)  Blood Gas Venous - Lactate: 0.9 mmol/L (05-27-22 @ 00:15)  Blood Gas Arterial, Lactate: 1.3 mmol/L (05-26-22 @ 22:05)  Blood Gas Venous - Lactate: 1.2 mmol/L (05-26-22 @ 22:05)  Blood Gas Venous - Lactate: 1.7 mmol/L (05-26-22 @ 20:40)  Blood Gas Venous - Lactate: 1.7 mmol/L (05-26-22 @ 20:20)  Blood Gas Arterial, Lactate: 1.6 mmol/L (05-26-22 @ 20:15)  Lactate, Blood: 1.1 mmol/L (05-25-22 @ 04:34)      ======================Micro/Rad/Cardio=================  Telemtry: Reviewed   EKG: Reviewed  CXR: Reviewed  Culture: Reviewed   Echo:   Cath:   ======================================================  PAST MEDICAL & SURGICAL HISTORY:  AV block      Essential hypertension      Chronic HFrEF (heart failure with reduced ejection fraction)      Chronic kidney disease, unspecified CKD stage      CAD (coronary artery disease)      HLD (hyperlipidemia)      Type 2 diabetes mellitus      Artificial cardiac pacemaker        ====================ASSESSMENT ==============  65 y/o male w/ PMHx HTN, HLD, type 2 DM, chronic HFrEF (EF 25-30% by Echo), complete heart block s/p PPM (in 2006, upgraded to BiV-ICD in 09/2016), CAD (s/p recent BERNABE mid LAD at [facility] on 04/18/2022), and stage II CKD (baseline Cr ~1.3) admitted for intermittent vtach, now s/p unsuccessful VT ablation. Transferred to [facility] CICU for LVAD/OHT eval.         Plan:  ====================== NEUROLOGY=====================  Neuro   - AAOx3  - No active issues    ==================== RESPIRATORY======================   Respiratory: Denies SOB or dyspnea; RA spO2 98%   - No active issues  - Oxygen supplement as needed       ====================CARDIOVASCULAR==================  Cardiogenic shock   - central sat 35%, started on milrinone .125  - repeat labs: central sat 43%--> increased milrinone 0.25  Currently on Milrinone@0.25 w/ numbers discussed above  - [facility] 5/27:L PA Sat  48.90 %     CO    2.99 l/min    HR  CI    1.58 l/min/m2    PO2  - milrinone increased to 0.375  - nipride started for AL reduction at 0.25  - will continue to monitor perfusion labs       VTach s/p EPS on 5/24, unable to perform ablation as no substrate found and did not induce VT because of severely low EF   - Interrogation of ICD @Dr Wilson's office 5/20: back-to-back episodes of VT @ 200+ bpm all terminating with ATP. Since last cath pt has had 41 VT episodes (all falling into VF zone)  - Normal device function. BIV pacing 97%. No programming changes made  -. c/w Amiodarone 400 mg PO BID load    Acute on chronic HFrEF (heart failure with reduced ejection fraction).   - ECHO 5/21/22 with LVEF 10-15% (reduced from 25-30% 3/2022)  - Received lasix 40 IVP x2 since 5/24 arrival on CCU with 1.8L urine output   - D/C Spironolactone for now     CAD (coronary artery disease).    Chest pain free and compliant with DAPT since last cath 4/18/22  - Trop flat 0.04 x2 CK, D-dimer normal -184  - Cardiac cath @[facility] 4/18/22: mLAD 90% s/p BERNABE, LCx mild disease, RCA mild disease s/p diagnostic cardiac cath w/ Dr Wagner on 05/23/2022   - ECHO (03/2022, per MD note): mild LV dilation, EF 25-30%, mild RV dilation, trace AI/MR.   - ECHO 5/21/22: LVEF 10-15%, abnormal septal motion seen due to RV pacing, remaining segments demonstrate severe hypokinesis, D- shaped flattening of the interventricular septum due to RV pressure overload, normal RV size, reduced RVSF, dilated RA, mild AI/AS, pulm HTN (PASP 51 mmHg), mildly dilated ascending aorta, based on TTE 2016 the LVEF is lower is RV function is now reduced.   - Cont ASA and Lipitor   - Plavix for advance therapy     Hyperlipidemia.   - , , HDL 29, LDL 84  - Home Atorvastatin increased to 80 mg PO qHS      aspirin enteric coated 81 milliGRAM(s) Oral daily  atorvastatin 80 milliGRAM(s) Oral at bedtime  clopidogrel Tablet 75 milliGRAM(s) Oral daily  aMIOdarone    Tablet 400 milliGRAM(s) Oral two times a day  milrinone Infusion 0.375 MICROgram(s)/kG/Min (8.27 mL/Hr) IV Continuous <Continuous>  nitroprusside Infusion 0.25 MICROgram(s)/kG/Min (2.76 mL/Hr) IV Continuous <Continuous>    ===================HEMATOLOGIC/ONC ===================  - Monitor H/H & PLTs  - Continue Heparin for venous thromboembolism prophylaxis.      heparin   Injectable 5000 Unit(s) SubCutaneous every 8 hours    ===================== RENAL =========================  JAGRUTI (acute kidney injury) CKD II  Admitted w/ Cr 2.01 (baseline Cr ~1.3) now trending down   IN: 529.7 mL / OUT: 700 mL / NET: -170.3 mL  - Avoid nephrotoxic agents, NSAIDs. Renally dose meds.    ==================== GASTROINTESTINAL===================  No active issues  - DASH diet w/ 1500ml Fluid restriction  - Continue Protonix  for stress ulcer prophylaxis.  - Bowel regimen with Miralax and senna         dextrose 5%. 1000 milliLiter(s) (100 mL/Hr) IV Continuous <Continuous>  dextrose 5%. 1000 milliLiter(s) (50 mL/Hr) IV Continuous <Continuous>  pantoprazole    Tablet 40 milliGRAM(s) Oral before breakfast  polyethylene glycol 3350 17 Gram(s) Oral daily  senna 2 Tablet(s) Oral at bedtime    =======================    ENDOCRINE  =====================  HX OF T2DM (A1C 6.2 on admission)  F/S 127-132  - Cont. ISS    Gout  - c/w allopurinol    allopurinol 75 milliGRAM(s) Oral daily  dextrose 50% Injectable 25 Gram(s) IV Push once  dextrose 50% Injectable 12.5 Gram(s) IV Push once  dextrose 50% Injectable 25 Gram(s) IV Push once  dextrose Oral Gel 15 Gram(s) Oral once PRN Blood Glucose LESS THAN 70 milliGRAM(s)/deciliter  glucagon  Injectable 1 milliGRAM(s) IntraMuscular once  insulin lispro (ADMELOG) corrective regimen sliding scale   SubCutaneous three times a day before meals  insulin lispro (ADMELOG) corrective regimen sliding scale   SubCutaneous at bedtime    ========================INFECTIOUS DISEASE================  Pt remains afebrile and No leukocytosis   - continue to monitor wbc and fever curve   - monitor off abx for now    Patient requires continuous monitoring with bedside rhythm monitoring, pulse ox monitoring, and intermittent blood gas analysis. Care plan discussed with ICU care team. Patient remained critical and at risk for life threatening decompensation.  Patient seen, examined and plan discussed with CCU team during rounds.     I have personally provided 35 minutes of critical care time excluding time spent on separate procedures, in addition to initial critical care time provided by the CICU Attending, Dr. Leonardo    By signing my name below, I, Bear Reagan, attest that this documentation has been prepared under the direction and in the presence of RAMSES Jimenez    Electronically signed: Mini Kirkpatrick, 05-27-22 @ 21:13    I, RAMSES Jimenez, personally performed the services described in this documentation. all medical record entries made by the nelidaibe were at my direction and in my presence. I have reviewed the chart and agree that the record reflects my personal performance and is accurate and complete  Electronically signed: RAMSES Jimenez

## 2022-05-27 NOTE — CONSULT NOTE ADULT - PROBLEM SELECTOR RECOMMENDATION 9
- most recent ECHO shows reduce EF 25-30%  - under went RHC on 5/27 showed mildly elevated filling pressures with low cardiac output  - increase primacor 0.375 mcg/kg/min  - discontinue Toprol due to liable MAPs  - discontinue Aldactone due to JAGRUTI  - currently undergoing advance therapy eval for LVAD/transplant. If all testing is completed, plan to present to selection committee this Thursday - increase milrinone to 0.375 mcg/kg/min  - increase minimum atrial rate from 70->80 bpm  - start nipride for goal SVR < 1200 with MAP > 65  - continue PAC monitoring (placed in cath lab 5/27)   - suspect he will need IABP this admission given high left sided filling pressures with difficulty unloading. will have low threshold if unable to increase cardiac output - increase milrinone to 0.375 mcg/kg/min  - increase minimum atrial rate from 70->80 bpm  - start nipride for goal SVR < 1200 with MAP > 65  - continue PAC monitoring (placed in cath lab 5/27)   - suspect he will need IABP this admission given high left sided filling pressures with difficulty unloading. will have low threshold if unable to increase cardiac output or worsening clinical status

## 2022-05-27 NOTE — CONSULT NOTE ADULT - ASSESSMENT
66M Mixed Ischemic/NICM Cardiomyopathy (EF 25-30% at baseline, s/p BIV-ICD), CAD (medically managed MIs in 2008,2011, Most recent stent in April 2022 to mLAD) presented with multiple near syncope, found to have 41 episodes of VT and admitted initially to cardiac telemetry for further evaluation/management. Ischemic evaluation without new disease and VT ablation without substrate to complete ablation. Transferred from [facility] to [facility] for further management and evaluation for LVAD vs OHT. CT surgery consulted for LVAD/transplant evaluation.     Currently undergoing LVAD/transplant eval  Care per Heart failure and CCU primary team  Preop work up and diagnostics in progress

## 2022-05-28 LAB
ALBUMIN SERPL ELPH-MCNC: 3 G/DL — LOW (ref 3.3–5)
ALBUMIN SERPL ELPH-MCNC: 3.1 G/DL — LOW (ref 3.3–5)
ALP SERPL-CCNC: 53 U/L — SIGNIFICANT CHANGE UP (ref 40–120)
ALP SERPL-CCNC: 56 U/L — SIGNIFICANT CHANGE UP (ref 40–120)
ALT FLD-CCNC: 17 U/L — SIGNIFICANT CHANGE UP (ref 10–45)
ALT FLD-CCNC: 18 U/L — SIGNIFICANT CHANGE UP (ref 10–45)
ANION GAP SERPL CALC-SCNC: 10 MMOL/L — SIGNIFICANT CHANGE UP (ref 5–17)
ANION GAP SERPL CALC-SCNC: 12 MMOL/L — SIGNIFICANT CHANGE UP (ref 5–17)
APPEARANCE UR: CLEAR — SIGNIFICANT CHANGE UP
AST SERPL-CCNC: 19 U/L — SIGNIFICANT CHANGE UP (ref 10–40)
AST SERPL-CCNC: 22 U/L — SIGNIFICANT CHANGE UP (ref 10–40)
BACTERIA # UR AUTO: 0 — SIGNIFICANT CHANGE UP
BASE EXCESS BLDMV CALC-SCNC: -1.1 MMOL/L — SIGNIFICANT CHANGE UP (ref -3–3)
BASE EXCESS BLDMV CALC-SCNC: -1.1 MMOL/L — SIGNIFICANT CHANGE UP (ref -3–3)
BASE EXCESS BLDMV CALC-SCNC: 1.7 MMOL/L — SIGNIFICANT CHANGE UP (ref -3–3)
BASE EXCESS BLDMV CALC-SCNC: 2 MMOL/L — SIGNIFICANT CHANGE UP (ref -3–3)
BASOPHILS # BLD AUTO: 0.05 K/UL — SIGNIFICANT CHANGE UP (ref 0–0.2)
BASOPHILS # BLD AUTO: 0.06 K/UL — SIGNIFICANT CHANGE UP (ref 0–0.2)
BASOPHILS NFR BLD AUTO: 0.4 % — SIGNIFICANT CHANGE UP (ref 0–2)
BASOPHILS NFR BLD AUTO: 0.6 % — SIGNIFICANT CHANGE UP (ref 0–2)
BILIRUB SERPL-MCNC: 0.8 MG/DL — SIGNIFICANT CHANGE UP (ref 0.2–1.2)
BILIRUB SERPL-MCNC: 0.9 MG/DL — SIGNIFICANT CHANGE UP (ref 0.2–1.2)
BILIRUB UR-MCNC: NEGATIVE — SIGNIFICANT CHANGE UP
BUN SERPL-MCNC: 34 MG/DL — HIGH (ref 7–23)
BUN SERPL-MCNC: 42 MG/DL — HIGH (ref 7–23)
CALCIUM SERPL-MCNC: 8.3 MG/DL — LOW (ref 8.4–10.5)
CALCIUM SERPL-MCNC: 8.6 MG/DL — SIGNIFICANT CHANGE UP (ref 8.4–10.5)
CHLORIDE SERPL-SCNC: 100 MMOL/L — SIGNIFICANT CHANGE UP (ref 96–108)
CHLORIDE SERPL-SCNC: 100 MMOL/L — SIGNIFICANT CHANGE UP (ref 96–108)
CMV IGG FLD QL: >10 U/ML — HIGH
CMV IGG SERPL-IMP: POSITIVE
CO2 BLDMV-SCNC: 25 MMOL/L — SIGNIFICANT CHANGE UP (ref 21–29)
CO2 BLDMV-SCNC: 25 MMOL/L — SIGNIFICANT CHANGE UP (ref 21–29)
CO2 BLDMV-SCNC: 29 MMOL/L — SIGNIFICANT CHANGE UP (ref 21–29)
CO2 BLDMV-SCNC: 29 MMOL/L — SIGNIFICANT CHANGE UP (ref 21–29)
CO2 SERPL-SCNC: 18 MMOL/L — LOW (ref 22–31)
CO2 SERPL-SCNC: 23 MMOL/L — SIGNIFICANT CHANGE UP (ref 22–31)
COLOR SPEC: YELLOW — SIGNIFICANT CHANGE UP
COVID-19 NUCLEOCAPSID GAM AB INTERP: POSITIVE
COVID-19 NUCLEOCAPSID TOTAL GAM ANTIBODY RESULT: 14.4 INDEX — HIGH
COVID-19 SPIKE DOMAIN AB INTERP: POSITIVE
COVID-19 SPIKE DOMAIN ANTIBODY RESULT: >250 U/ML — HIGH
CREAT SERPL-MCNC: 1.53 MG/DL — HIGH (ref 0.5–1.3)
CREAT SERPL-MCNC: 1.66 MG/DL — HIGH (ref 0.5–1.3)
DIFF PNL FLD: ABNORMAL
EBV EA AB SER IA-ACNC: <5 U/ML — SIGNIFICANT CHANGE UP
EBV EA AB TITR SER IF: NEGATIVE — SIGNIFICANT CHANGE UP
EBV EA IGG SER-ACNC: NEGATIVE — SIGNIFICANT CHANGE UP
EBV NA IGG SER IA-ACNC: <3 U/ML — SIGNIFICANT CHANGE UP
EBV PATRN SPEC IB-IMP: SIGNIFICANT CHANGE UP
EBV VCA IGG AVIDITY SER QL IA: NEGATIVE — SIGNIFICANT CHANGE UP
EBV VCA IGM SER IA-ACNC: <10 U/ML — SIGNIFICANT CHANGE UP
EBV VCA IGM SER IA-ACNC: <10 U/ML — SIGNIFICANT CHANGE UP
EBV VCA IGM TITR FLD: NEGATIVE — SIGNIFICANT CHANGE UP
EGFR: 46 ML/MIN/1.73M2 — LOW
EGFR: 50 ML/MIN/1.73M2 — LOW
EOSINOPHIL # BLD AUTO: 0.24 K/UL — SIGNIFICANT CHANGE UP (ref 0–0.5)
EOSINOPHIL # BLD AUTO: 0.29 K/UL — SIGNIFICANT CHANGE UP (ref 0–0.5)
EOSINOPHIL NFR BLD AUTO: 1.9 % — SIGNIFICANT CHANGE UP (ref 0–6)
EOSINOPHIL NFR BLD AUTO: 2.7 % — SIGNIFICANT CHANGE UP (ref 0–6)
EPI CELLS # UR: 0 /HPF — SIGNIFICANT CHANGE UP
ERYTHROCYTE [SEDIMENTATION RATE] IN BLOOD: 26 MM/HR — HIGH (ref 0–20)
GAS PNL BLDA: SIGNIFICANT CHANGE UP
GAS PNL BLDMV: SIGNIFICANT CHANGE UP
GLUCOSE BLDC GLUCOMTR-MCNC: 127 MG/DL — HIGH (ref 70–99)
GLUCOSE BLDC GLUCOMTR-MCNC: 144 MG/DL — HIGH (ref 70–99)
GLUCOSE BLDC GLUCOMTR-MCNC: 156 MG/DL — HIGH (ref 70–99)
GLUCOSE BLDC GLUCOMTR-MCNC: 158 MG/DL — HIGH (ref 70–99)
GLUCOSE SERPL-MCNC: 141 MG/DL — HIGH (ref 70–99)
GLUCOSE SERPL-MCNC: 161 MG/DL — HIGH (ref 70–99)
GLUCOSE UR QL: NEGATIVE — SIGNIFICANT CHANGE UP
HAV IGG SER QL IA: REACTIVE
HBV CORE AB SER-ACNC: SIGNIFICANT CHANGE UP
HBV SURFACE AB SER-ACNC: SIGNIFICANT CHANGE UP
HBV SURFACE AG SER-ACNC: SIGNIFICANT CHANGE UP
HCO3 BLDMV-SCNC: 24 MMOL/L — SIGNIFICANT CHANGE UP (ref 20–28)
HCO3 BLDMV-SCNC: 24 MMOL/L — SIGNIFICANT CHANGE UP (ref 20–28)
HCO3 BLDMV-SCNC: 27 MMOL/L — SIGNIFICANT CHANGE UP (ref 20–28)
HCO3 BLDMV-SCNC: 27 MMOL/L — SIGNIFICANT CHANGE UP (ref 20–28)
HCT VFR BLD CALC: 38.8 % — LOW (ref 39–50)
HCT VFR BLD CALC: 40.8 % — SIGNIFICANT CHANGE UP (ref 39–50)
HCV RNA SPEC NAA+PROBE-LOG IU: SIGNIFICANT CHANGE UP
HCV RNA SPEC NAA+PROBE-LOG IU: SIGNIFICANT CHANGE UP LOGIU/ML
HGB BLD-MCNC: 13.3 G/DL — SIGNIFICANT CHANGE UP (ref 13–17)
HGB BLD-MCNC: 13.6 G/DL — SIGNIFICANT CHANGE UP (ref 13–17)
HIV-1 VIRAL LOAD RESULT: SIGNIFICANT CHANGE UP
HIV1 RNA # SERPL NAA+PROBE: SIGNIFICANT CHANGE UP COPIES/ML
HIV1 RNA SER-IMP: SIGNIFICANT CHANGE UP
HIV1 RNA SERPL NAA+PROBE-ACNC: SIGNIFICANT CHANGE UP
HIV1 RNA SERPL NAA+PROBE-LOG#: SIGNIFICANT CHANGE UP LG COP/ML
HOROWITZ INDEX BLDMV+IHG-RTO: 21 — SIGNIFICANT CHANGE UP
HOROWITZ INDEX BLDMV+IHG-RTO: 28 — SIGNIFICANT CHANGE UP
HOROWITZ INDEX BLDMV+IHG-RTO: 28 — SIGNIFICANT CHANGE UP
HSV1 IGG SER-ACNC: 51.3 INDEX — HIGH
HSV1 IGG SERPL QL IA: POSITIVE
HSV2 IGG FLD-ACNC: 0.34 INDEX — SIGNIFICANT CHANGE UP
HSV2 IGG SERPL QL IA: NEGATIVE — SIGNIFICANT CHANGE UP
HYALINE CASTS # UR AUTO: 0 /LPF — SIGNIFICANT CHANGE UP (ref 0–2)
IGA FLD-MCNC: 462 MG/DL — SIGNIFICANT CHANGE UP (ref 84–499)
IGG FLD-MCNC: 1066 MG/DL — SIGNIFICANT CHANGE UP (ref 610–1660)
IGM SERPL-MCNC: 60 MG/DL — SIGNIFICANT CHANGE UP (ref 35–242)
IMM GRANULOCYTES NFR BLD AUTO: 0.4 % — SIGNIFICANT CHANGE UP (ref 0–1.5)
IMM GRANULOCYTES NFR BLD AUTO: 0.4 % — SIGNIFICANT CHANGE UP (ref 0–1.5)
KAPPA LC SER QL IFE: 3.64 MG/DL — HIGH (ref 0.33–1.94)
KAPPA LC SER QL IFE: 3.64 MG/DL — HIGH (ref 0.33–1.94)
KAPPA/LAMBDA FREE LIGHT CHAIN RATIO, SERUM: 1.34 RATIO — SIGNIFICANT CHANGE UP (ref 0.26–1.65)
KAPPA/LAMBDA FREE LIGHT CHAIN RATIO, SERUM: 1.34 RATIO — SIGNIFICANT CHANGE UP (ref 0.26–1.65)
KETONES UR-MCNC: NEGATIVE — SIGNIFICANT CHANGE UP
LAMBDA LC SER QL IFE: 2.71 MG/DL — HIGH (ref 0.57–2.63)
LAMBDA LC SER QL IFE: 2.71 MG/DL — HIGH (ref 0.57–2.63)
LEUKOCYTE ESTERASE UR-ACNC: NEGATIVE — SIGNIFICANT CHANGE UP
LYMPHOCYTES # BLD AUTO: 16.4 % — SIGNIFICANT CHANGE UP (ref 13–44)
LYMPHOCYTES # BLD AUTO: 2.08 K/UL — SIGNIFICANT CHANGE UP (ref 1–3.3)
LYMPHOCYTES # BLD AUTO: 2.36 K/UL — SIGNIFICANT CHANGE UP (ref 1–3.3)
LYMPHOCYTES # BLD AUTO: 22 % — SIGNIFICANT CHANGE UP (ref 13–44)
MAGNESIUM SERPL-MCNC: 1.8 MG/DL — SIGNIFICANT CHANGE UP (ref 1.6–2.6)
MAGNESIUM SERPL-MCNC: 2.1 MG/DL — SIGNIFICANT CHANGE UP (ref 1.6–2.6)
MCHC RBC-ENTMCNC: 30.4 PG — SIGNIFICANT CHANGE UP (ref 27–34)
MCHC RBC-ENTMCNC: 30.9 PG — SIGNIFICANT CHANGE UP (ref 27–34)
MCHC RBC-ENTMCNC: 33.3 GM/DL — SIGNIFICANT CHANGE UP (ref 32–36)
MCHC RBC-ENTMCNC: 34.3 GM/DL — SIGNIFICANT CHANGE UP (ref 32–36)
MCV RBC AUTO: 90.2 FL — SIGNIFICANT CHANGE UP (ref 80–100)
MCV RBC AUTO: 91.1 FL — SIGNIFICANT CHANGE UP (ref 80–100)
MEV IGG SER-ACNC: >300 AU/ML — SIGNIFICANT CHANGE UP
MEV IGG+IGM SER-IMP: POSITIVE — SIGNIFICANT CHANGE UP
MONOCYTES # BLD AUTO: 1.3 K/UL — HIGH (ref 0–0.9)
MONOCYTES # BLD AUTO: 1.48 K/UL — HIGH (ref 0–0.9)
MONOCYTES NFR BLD AUTO: 11.7 % — SIGNIFICANT CHANGE UP (ref 2–14)
MONOCYTES NFR BLD AUTO: 12.1 % — SIGNIFICANT CHANGE UP (ref 2–14)
MRSA PCR RESULT.: SIGNIFICANT CHANGE UP
MUV AB SER-ACNC: POSITIVE
MUV IGG FLD-ACNC: 273 AU/ML — SIGNIFICANT CHANGE UP
NEUTROPHILS # BLD AUTO: 6.67 K/UL — SIGNIFICANT CHANGE UP (ref 1.8–7.4)
NEUTROPHILS # BLD AUTO: 8.79 K/UL — HIGH (ref 1.8–7.4)
NEUTROPHILS NFR BLD AUTO: 62.2 % — SIGNIFICANT CHANGE UP (ref 43–77)
NEUTROPHILS NFR BLD AUTO: 69.2 % — SIGNIFICANT CHANGE UP (ref 43–77)
NITRITE UR-MCNC: NEGATIVE — SIGNIFICANT CHANGE UP
NRBC # BLD: 0 /100 WBCS — SIGNIFICANT CHANGE UP (ref 0–0)
NRBC # BLD: 0 /100 WBCS — SIGNIFICANT CHANGE UP (ref 0–0)
O2 CT VFR BLD CALC: 35 MMHG — SIGNIFICANT CHANGE UP (ref 30–65)
O2 CT VFR BLD CALC: 39 MMHG — SIGNIFICANT CHANGE UP (ref 30–65)
O2 CT VFR BLD CALC: 46 MMHG — SIGNIFICANT CHANGE UP (ref 30–65)
O2 CT VFR BLD CALC: 47 MMHG — SIGNIFICANT CHANGE UP (ref 30–65)
PCO2 BLDMV: 38 MMHG — SIGNIFICANT CHANGE UP (ref 30–65)
PCO2 BLDMV: 38 MMHG — SIGNIFICANT CHANGE UP (ref 30–65)
PCO2 BLDMV: 44 MMHG — SIGNIFICANT CHANGE UP (ref 30–65)
PCO2 BLDMV: 45 MMHG — SIGNIFICANT CHANGE UP (ref 30–65)
PH BLDMV: 7.39 — SIGNIFICANT CHANGE UP (ref 7.32–7.45)
PH BLDMV: 7.4 — SIGNIFICANT CHANGE UP (ref 7.32–7.45)
PH UR: 6 — SIGNIFICANT CHANGE UP (ref 5–8)
PHOSPHATE SERPL-MCNC: 2 MG/DL — LOW (ref 2.5–4.5)
PHOSPHATE SERPL-MCNC: 2.2 MG/DL — LOW (ref 2.5–4.5)
PLATELET # BLD AUTO: 169 K/UL — SIGNIFICANT CHANGE UP (ref 150–400)
PLATELET # BLD AUTO: 193 K/UL — SIGNIFICANT CHANGE UP (ref 150–400)
POTASSIUM SERPL-MCNC: 4.1 MMOL/L — SIGNIFICANT CHANGE UP (ref 3.5–5.3)
POTASSIUM SERPL-MCNC: 4.4 MMOL/L — SIGNIFICANT CHANGE UP (ref 3.5–5.3)
POTASSIUM SERPL-SCNC: 4.1 MMOL/L — SIGNIFICANT CHANGE UP (ref 3.5–5.3)
POTASSIUM SERPL-SCNC: 4.4 MMOL/L — SIGNIFICANT CHANGE UP (ref 3.5–5.3)
PROT SERPL-MCNC: 6.1 G/DL — SIGNIFICANT CHANGE UP (ref 6–8.3)
PROT SERPL-MCNC: 6.2 G/DL — SIGNIFICANT CHANGE UP (ref 6–8.3)
PROT UR-MCNC: ABNORMAL
RAPID RVP RESULT: SIGNIFICANT CHANGE UP
RBC # BLD: 4.3 M/UL — SIGNIFICANT CHANGE UP (ref 4.2–5.8)
RBC # BLD: 4.48 M/UL — SIGNIFICANT CHANGE UP (ref 4.2–5.8)
RBC # FLD: 14.9 % — HIGH (ref 10.3–14.5)
RBC # FLD: 15 % — HIGH (ref 10.3–14.5)
RBC CASTS # UR COMP ASSIST: 16 /HPF — HIGH (ref 0–4)
RUBV IGG SER-ACNC: <0.1 INDEX — SIGNIFICANT CHANGE UP
RUBV IGG SER-IMP: NEGATIVE — SIGNIFICANT CHANGE UP
S AUREUS DNA NOSE QL NAA+PROBE: SIGNIFICANT CHANGE UP
SAO2 % BLDMV: 57.7 — LOW (ref 60–90)
SAO2 % BLDMV: 60.7 — SIGNIFICANT CHANGE UP (ref 60–90)
SAO2 % BLDMV: 72.8 — SIGNIFICANT CHANGE UP (ref 60–90)
SAO2 % BLDMV: 78.2 — SIGNIFICANT CHANGE UP (ref 60–90)
SARS-COV-2 IGG+IGM SERPL QL IA: 14.4 INDEX — HIGH
SARS-COV-2 IGG+IGM SERPL QL IA: >250 U/ML — HIGH
SARS-COV-2 IGG+IGM SERPL QL IA: POSITIVE
SARS-COV-2 IGG+IGM SERPL QL IA: POSITIVE
SARS-COV-2 RNA SPEC QL NAA+PROBE: SIGNIFICANT CHANGE UP
SODIUM SERPL-SCNC: 130 MMOL/L — LOW (ref 135–145)
SODIUM SERPL-SCNC: 133 MMOL/L — LOW (ref 135–145)
SP GR SPEC: 1.02 — SIGNIFICANT CHANGE UP (ref 1.01–1.02)
T GONDII IGG SER QL: <3 IU/ML — SIGNIFICANT CHANGE UP
T GONDII IGG SER QL: NEGATIVE — SIGNIFICANT CHANGE UP
T PALLIDUM AB TITR SER: NEGATIVE — SIGNIFICANT CHANGE UP
UROBILINOGEN FLD QL: ABNORMAL
VZV IGG FLD QL IA: 3037 INDEX — SIGNIFICANT CHANGE UP
VZV IGG FLD QL IA: POSITIVE — SIGNIFICANT CHANGE UP
WBC # BLD: 10.72 K/UL — HIGH (ref 3.8–10.5)
WBC # BLD: 12.69 K/UL — HIGH (ref 3.8–10.5)
WBC # FLD AUTO: 10.72 K/UL — HIGH (ref 3.8–10.5)
WBC # FLD AUTO: 12.69 K/UL — HIGH (ref 3.8–10.5)
WBC UR QL: 2 /HPF — SIGNIFICANT CHANGE UP (ref 0–5)

## 2022-05-28 PROCEDURE — 99292 CRITICAL CARE ADDL 30 MIN: CPT | Mod: 25

## 2022-05-28 PROCEDURE — 99291 CRITICAL CARE FIRST HOUR: CPT | Mod: GC

## 2022-05-28 PROCEDURE — 71045 X-RAY EXAM CHEST 1 VIEW: CPT | Mod: 26

## 2022-05-28 PROCEDURE — 99291 CRITICAL CARE FIRST HOUR: CPT | Mod: 25

## 2022-05-28 PROCEDURE — 93010 ELECTROCARDIOGRAM REPORT: CPT

## 2022-05-28 RX ORDER — MAGNESIUM SULFATE 500 MG/ML
2 VIAL (ML) INJECTION ONCE
Refills: 0 | Status: COMPLETED | OUTPATIENT
Start: 2022-05-28 | End: 2022-05-29

## 2022-05-28 RX ORDER — ACETAMINOPHEN 500 MG
1000 TABLET ORAL ONCE
Refills: 0 | Status: COMPLETED | OUTPATIENT
Start: 2022-05-28 | End: 2022-05-28

## 2022-05-28 RX ORDER — COLCHICINE 0.6 MG
0.6 TABLET ORAL DAILY
Refills: 0 | Status: DISCONTINUED | OUTPATIENT
Start: 2022-05-28 | End: 2022-05-30

## 2022-05-28 RX ORDER — POLYETHYLENE GLYCOL 3350 17 G/17G
17 POWDER, FOR SOLUTION ORAL
Refills: 0 | Status: DISCONTINUED | OUTPATIENT
Start: 2022-05-28 | End: 2022-06-07

## 2022-05-28 RX ORDER — GLYCERIN ADULT
1 SUPPOSITORY, RECTAL RECTAL ONCE
Refills: 0 | Status: COMPLETED | OUTPATIENT
Start: 2022-05-28 | End: 2022-05-28

## 2022-05-28 RX ORDER — ACETAMINOPHEN 500 MG
650 TABLET ORAL ONCE
Refills: 0 | Status: COMPLETED | OUTPATIENT
Start: 2022-05-28 | End: 2022-05-28

## 2022-05-28 RX ORDER — VANCOMYCIN HCL 1 G
1000 VIAL (EA) INTRAVENOUS EVERY 24 HOURS
Refills: 0 | Status: COMPLETED | OUTPATIENT
Start: 2022-05-28 | End: 2022-05-28

## 2022-05-28 RX ORDER — LIDOCAINE 4 G/100G
1 CREAM TOPICAL DAILY
Refills: 0 | Status: DISCONTINUED | OUTPATIENT
Start: 2022-05-28 | End: 2022-06-17

## 2022-05-28 RX ADMIN — HEPARIN SODIUM 5000 UNIT(S): 5000 INJECTION INTRAVENOUS; SUBCUTANEOUS at 05:45

## 2022-05-28 RX ADMIN — Medication 63.75 MILLIMOLE(S): at 05:56

## 2022-05-28 RX ADMIN — AMIODARONE HYDROCHLORIDE 400 MILLIGRAM(S): 400 TABLET ORAL at 17:14

## 2022-05-28 RX ADMIN — Medication 650 MILLIGRAM(S): at 19:08

## 2022-05-28 RX ADMIN — Medication 400 MILLIGRAM(S): at 22:49

## 2022-05-28 RX ADMIN — SENNA PLUS 2 TABLET(S): 8.6 TABLET ORAL at 21:01

## 2022-05-28 RX ADMIN — Medication 1 SUPPOSITORY(S): at 12:36

## 2022-05-28 RX ADMIN — POLYETHYLENE GLYCOL 3350 17 GRAM(S): 17 POWDER, FOR SOLUTION ORAL at 17:14

## 2022-05-28 RX ADMIN — Medication 81 MILLIGRAM(S): at 12:10

## 2022-05-28 RX ADMIN — Medication 75 MILLIGRAM(S): at 12:10

## 2022-05-28 RX ADMIN — Medication 1000 MILLIGRAM(S): at 01:39

## 2022-05-28 RX ADMIN — CHLORHEXIDINE GLUCONATE 1 APPLICATION(S): 213 SOLUTION TOPICAL at 06:05

## 2022-05-28 RX ADMIN — PANTOPRAZOLE SODIUM 40 MILLIGRAM(S): 20 TABLET, DELAYED RELEASE ORAL at 08:36

## 2022-05-28 RX ADMIN — LIDOCAINE 1 PATCH: 4 CREAM TOPICAL at 12:30

## 2022-05-28 RX ADMIN — Medication 400 MILLIGRAM(S): at 01:09

## 2022-05-28 RX ADMIN — HEPARIN SODIUM 5000 UNIT(S): 5000 INJECTION INTRAVENOUS; SUBCUTANEOUS at 14:35

## 2022-05-28 RX ADMIN — Medication 2: at 17:40

## 2022-05-28 RX ADMIN — HEPARIN SODIUM 5000 UNIT(S): 5000 INJECTION INTRAVENOUS; SUBCUTANEOUS at 21:01

## 2022-05-28 RX ADMIN — MILRINONE LACTATE 11 MICROGRAM(S)/KG/MIN: 1 INJECTION, SOLUTION INTRAVENOUS at 20:24

## 2022-05-28 RX ADMIN — Medication 650 MILLIGRAM(S): at 20:00

## 2022-05-28 RX ADMIN — LIDOCAINE 1 PATCH: 4 CREAM TOPICAL at 20:27

## 2022-05-28 RX ADMIN — MILRINONE LACTATE 8.27 MICROGRAM(S)/KG/MIN: 1 INJECTION, SOLUTION INTRAVENOUS at 07:32

## 2022-05-28 RX ADMIN — AMIODARONE HYDROCHLORIDE 400 MILLIGRAM(S): 400 TABLET ORAL at 05:45

## 2022-05-28 RX ADMIN — ATORVASTATIN CALCIUM 80 MILLIGRAM(S): 80 TABLET, FILM COATED ORAL at 21:00

## 2022-05-28 RX ADMIN — Medication 0.6 MILLIGRAM(S): at 12:09

## 2022-05-28 RX ADMIN — Medication 250 MILLIGRAM(S): at 20:55

## 2022-05-28 NOTE — PROGRESS NOTE ADULT - PROBLEM SELECTOR PLAN 2
- BB on hold while in cardiogenic shock on milrinone  - ACE/ARB/ARNI/MRA on hold with JAGRUTI  - lasix 40 mg IV PRN for goal CVP 8-10

## 2022-05-28 NOTE — PROGRESS NOTE ADULT - PROBLEM SELECTOR PLAN 1
- C/w milrinone to 0.375 mcg/kg/min  - CI/CO improved after AV pacing rate increased to 80  - Nipride weaned off at 3am, continue to keep MAP ~65 with hydralazine prn  - Continue PAC monitoring (placed in cath lab 5/27)   - Pending advanced therapies eval - C/w milrinone to 0.375 mcg/kg/min  - CI/CO improved after AV pacing rate increased to 80  - Nipride weaned off at 3am, continue to keep MAP ~65 with hydralazine prn  - Continue PAC monitoring (placed in cath lab 5/27)   - Pending advanced therapies eval for LVAD/transplant, will need colonoscopy when better compensated - C/w milrinone to 0.375 mcg/kg/min  - CI/CO improved after AV pacing rate increased to 80  - Nipride weaned off at 3am, continue to keep MAP ~70 with hydralazine prn  - Continue PAC monitoring (placed in cath lab 5/27)   - Pending advanced therapies eval for LVAD/transplant, will need colonoscopy when better compensated. Of note he had within a 24 hour span on 5/28 a SBP < 90, PCWP > 20, and CI of 1.7 on inotropes

## 2022-05-28 NOTE — PROGRESS NOTE ADULT - ASSESSMENT
67 YO M with a history of ACC/AHA Stage C->D mixed NICM/ICM (likely familial with strong FH and early arrhythmia history in his 30's) with LVED 5.2 cm and LVEF 10-15% s/p PPM upgraded to CRT-D, CAD s/p PCI to mLAD 4/2022, well controlled DM2 (A1c 6.2%), and CKD III (Cr 1.4) who initially presented to [facility] 5/20 with near syncope in setting of worsening HF symptoms and found to have 41 episodes of VT, many terminating with ATP. LHC did not reveal new obstructive CAD and her underwent EPS which did not reveal endocardial substrate amenable for ablation. RHC revealed severely depressed cardiac output and he was transferred to [facility] 5/26 for advanced therapies evaluation.    His hemodynamics on [facility] 05/26 revealed mildly elevated right sided and severely elevated left sided filling pressures with severe post > pre-capillary pulmonary hypertension. He had a low cardiac output with evidence of JAGRUTI but rest of end organ function was intact. He is INTERMACS 2-3 and SCAI stage B. He will likely need advanced therapies this admission and suspect he will need unloading with IABP/Impella. His PVR on [facility] 05/26 of 12 PERSAUD was deemed too high for transplant but has been improving with medical/device optimization.     Review of studies  TTE 5/21: LV 5.2 cm, LVEF 10-15%, LVOT VTI 10 cm, moderate RV dysfunction, mild AI, minimal MR  EKG: a-BiV paced  [facility] 5/23: patent mLAD stent with slow flow, D1 with 40-50% stenosis, mild disease otherwise  [facility] 5/26: RA 12, PA 70/40 (50), PCWP 22, Milana CO/CI 2.3/1.3, MAP 83 with SVR 2469, PVR 12 PERSAUD    Hemodynamics  5/27: RA 10, PA 76/35 (49), PCWP 34, PA sat 57% with Milana CO/CI 3.1/1.6, MAP 71 with SVR 1574, PVR 4.8  5/28 (on milrinone 0.375, nipride has been off since 3am): RA 7, PA 63/31, PCWP 26, PA sat 72.8% with Milana CO/CI 4.9/2.5, MAP 70 with SVR 1039, PVR 2.9   67 YO M with a history of ACC/AHA Stage C->D mixed NICM/ICM (likely familial with strong FH and early arrhythmia history in his 30's) with LVED 5.2 cm and LVEF 10-15% s/p PPM upgraded to CRT-D, CAD s/p PCI to mLAD 4/2022, well controlled DM2 (A1c 6.2%), and CKD III (Cr 1.4) who initially presented to [facility] 5/20 with near syncope in setting of worsening HF symptoms and found to have 41 episodes of VT, many terminating with ATP. LHC did not reveal new obstructive CAD and her underwent EPS which did not reveal endocardial substrate amenable for ablation. RHC revealed severely depressed cardiac output and he was transferred to [facility] 5/26 for advanced therapies evaluation.    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 He has improved significantly with higher doses of inotropes and increased pacing rate. He is INTERMACS 3 and SCAI stage B. He will likely need advanced therapies this admission and suspect he will need unloading with IABP/Impella. He has significant pulmonary hypertension but PVR is improving.       Review of studies  TTE 5/21: LV 5.2 cm, LVEF 10-15%, LVOT VTI 10 cm, moderate RV dysfunction, mild AI, minimal MR  EKG: a-BiV paced  [facility] 5/23: patent mLAD stent with slow flow, D1 with 40-50% stenosis, mild disease otherwise  [facility] 5/26: RA 12, PA 70/40 (50), PCWP 22, Milana CO/CI 2.3/1.3, MAP 83 with SVR 2469, PVR 12 PERSAUD    Hemodynamics  5/27 (milrinone 0.25): RA 10, PA 76/35 (49), PCWP 34, PA sat 57% with Milana CO/CI 3.1/1.6, MAP 71 with SVR 1574, PVR 4.8  5/28 (on milrinone 0.375, nipride has been off since 3am): RA 7, PA 63/31, PCWP 26, PA sat 72.8% with Milana CO/CI 4.9/2.5, MAP 70 with SVR 1039, PVR 2.9

## 2022-05-28 NOTE — PROGRESS NOTE ADULT - ASSESSMENT
65 y/o male w/ PMHx HTN, HLD, type 2 DM, chronic HFrEF (EF 25-30% by Echo), complete heart block s/p PPM (in 2006, upgraded to BiV-ICD in 09/2016), CAD (s/p recent BERNABE mid LAD at [facility] on 04/18/2022), and stage II CKD (baseline Cr ~1.3) admitted for intermittent vtach, now s/p unsuccessful VT ablation. Transferred to [facility] for LVAD/OHT eval.     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  - SPO2 98% on 2L nasal cannula     # Cardiovascular  Cardiogenic shock   - TTE 5/21: LV 5.2 cm, LVEF 10-15%, LVOT VTI 10 cm, moderate RV dysfunction, mild AI, minimal MR  - [facility] 5/23: patent mLAD stent with slow flow, D1 with 40-50% stenosis  - [facility] 5/26: RA 12, PA 70/40 (50), PCWP 22, Milana CO/CI 2.3/1.3, MAP 83 with SVR 2469, PVR 12 PERSAUD  - 5/27: RA 10, PA 76/35 (49), PCWP 34, PA sat 57% with Milana CO/CI 3.1/1.6, MAP 71 with SVR 1574, PVR 4.8  - Maintain Milrinone .375mcg/kg/min   - monitor hemodynamics and perfusion indices with PA catheter in place  - goal CVP 8-10, has been single digits, holding diuresis  - monitor strict IOs and daily wts   - Undergoing VAD workup, not a candidate for transplant at this time given PVR. Will need a colonoscopy this week.   - low threshold for escalation to IABP if hemodynamics worsen     VTach   - s/p EPS on 5/24, unable to perform ablation as no substrate found and did not induce VT because of severely low EF   - Interrogation of ICD @Dr Wilson's office 5/20: back-to-back episodes of VT @ 200+ bpm all terminating with ATP. Since last cath pt has had 41 VT episodes (all falling into VF zone)  - Normal device function. BIV pacing 97%. No programming changes made  -. c/w Amiodarone 400 mg PO BID load  - BB held due to cardiogenic shock  - undergoing LVAD eval as above   - no further VT on inotropes     CAD   - Chest pain free and compliant with DAPT since last PCI 4/18/22  - Cardiac cath @[facility] 4/18/22: mLAD 90% s/p BERNABE, LCx mild disease, RCA mild disease s/p diagnostic cardiac cath w/ Dr Wagner on 05/23/2022   - Cont DAPT and Lipitor     #GI  - No active issues  - DASH diet w/ 1500ml Fluid restriction  - Cont. PPI and bowel regimen     # Renal: JAGRUTI (acute kidney injury) CKD II  Admitted w/ Cr 2.01 (baseline Cr ~1.3) now trending down   - Avoid nephrotoxic agents, NSAIDs. Renally dose meds.  - monitor strict IOs and continue current cardiac support     # Heme/Onc  - No active issues  - HSQ for DVT ppx     # Endo HX OF T2DM (A1C 6.2 on admission)  F/S 127-132  - Cont. ISS, glucose controlled     # ID: Pt remains afebrile and No leukocytosis   - continue to monitor wbc and fever curve   - monitor off abx for now   67 y/o male w/ PMHx HTN, HLD, type 2 DM, chronic HFrEF (EF 25-30% by Echo), complete heart block s/p PPM (in 2006, upgraded to BiV-ICD in 09/2016), CAD (s/p recent BERNABE mid LAD at [facility] on 04/18/2022), and stage II CKD (baseline Cr ~1.3) presented with near syncope to OSH found to have 41 episodes of VT on device, s/p unsuccessful VT ablation and transferred to [facility] for management of cardiogenic shock and advanced therapy eval.     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  - SPO2 94-95% on room air     Cardiovascular  # Cardiogenic shock   - TTE 5/21: LV 5.2 cm, LVEF 10-15%, LVOT VTI 10 cm, moderate RV dysfunction, mild AI, minimal MR  - [facility] 5/23: patent mLAD stent with slow flow, D1 with 40-50% stenosis  - [facility] 5/26: RA 12, PA 70/40 (50), PCWP 22, Milana CO/CI 2.3/1.3, MAP 83 with SVR 2469, PVR 12 PERSAUD  - 5/27: RA 10, PA 76/35 (49), PCWP 34, PA sat 57% with Milana CO/CI 3.1/1.6, MAP 71 with SVR 1574, PVR 4.8  - Maintain Milrinone .375mcg/kg/min   - monitor hemodynamics and perfusion indices with PA catheter in place  - goal CVP 8-10, has been single digits, holding diuresis  - monitor strict IOs and daily wts   - Undergoing VAD/OHT workup, Will need a CT colo monday or tuesday once patient remains stable on current support.   - low threshold for escalation to IABP if hemodynamics worsen     # VTach   - s/p EPS on 5/24, unable to perform ablation as no substrate found and did not induce VT because of severely low EF   - Interrogation of ICD @Dr Wilson's office 5/20: back-to-back episodes of VT @ 200+ bpm all terminating with ATP. Since last cath pt has had 41 VT episodes (all falling into VF zone)  - Normal device function. BIV pacing 97%. No programming changes made  -. c/w Amiodarone 400 mg PO BID load, monitor LFTs   - BB held due to cardiogenic shock  - undergoing OHT/LVAD eval as above   - no further VT on inotropes     # CAD   - Chest pain free and compliant with DAPT since last PCI 4/18/22  - Cardiac cath @[facility] 4/18/22: mLAD 90% s/p BERNABE, LCx mild disease, RCA mild disease s/p diagnostic cardiac cath w/ Dr Wagner on 05/23/2022   - Cont ASA and Lipitor   - per discussion with interventional at OSH, ok to DC plavix at this time     GI  - No active issues  - DASH diet w/ 1500ml Fluid restriction  - Cont. PPI and bowel regimen   - Will need CT colo this week     Renal  # JAGRUTI on CKD II  Admitted w/ Cr 2.01 (baseline Cr ~1.3) now trending down   - Avoid nephrotoxic agents, NSAIDs. Renally dose meds.  - monitor strict IOs and continue current cardiac support     Heme/Onc  - No active issues  - HSQ for DVT ppx     Endo   # T2DM (A1C 6.2 on admission)  - Cont. ISS, glucose controlled     ID  - Pt remains afebrile and No leukocytosis   - continue to monitor wbc and fever curve   - monitor off abx for now    Lines  COCO Buck (5/26 OSH)  HIREN Bonilla (OSH 5/26)  [facility] 5/27

## 2022-05-28 NOTE — PROGRESS NOTE ADULT - CRITICAL CARE ATTENDING COMMENT
Presented to [facility] with VT, found to be in cardiogenic shock and transferred to [facility]  A+Ox3  Cardiogenic shock, improved after adding Milrinone and increasing pacing rate to 80, CI 2.6, SVR 1200s, SBP 90s   Holding Toprol, continue Amio PO for VT  S/p stent in 4/22, continue ASA but hold Plavix for potential procedures/surgeries  O2 sats mid 90s on room air  DASH/diabetic diet  Non-oliguric JAGRUTI, CVP 8, improved with Milrinone - target even   H/H acceptable on HSQ for DVT prophylaxis   Afebrile, no antibiotics  Sugars controlled  RIJ Cordis (placed at [facility]) 5/26 and [facility] 5/27 and leilani ODOM

## 2022-05-28 NOTE — PROGRESS NOTE ADULT - SUBJECTIVE AND OBJECTIVE BOX
GOCOOL, LENNOX  MRN-[id number]  Patient is a 66y old  Male who presents with a chief complaint of LVAD/OHT eval (28 May 2022 09:07)    HPI:  66M Mixed Ischemic/NICM Cardiomyopathy (EF 25-30% at baseline, s/p BIV-ICD), CAD (medically managed MIs in ,, Most recent stent in 2022 to mLAD) presented with multiple near syncope, found to have 41 episodes of VT and admitted initially to cardiac telemetry for further evaluation/management. Ischemic evaluation without new disease and VT ablation without substrate to complete ablation.   Transferred from [facility] for further management and evaluation for LVAD vs OHT.    (26 May 2022 20:31)      Hospital Course:   Transferred to CCU for further management     24 HOUR EVENTS:    REVIEW OF SYSTEMS:    CONSTITUTIONAL: No weakness, fevers or chills  EYES/ENT: No visual changes;  No vertigo or throat pain   NECK: No pain or stiffness  RESPIRATORY: No cough, wheezing, hemoptysis; No shortness of breath  CARDIOVASCULAR: No chest pain or palpitations  GASTROINTESTINAL: No abdominal or epigastric pain. No nausea, vomiting, or hematemesis; No diarrhea or constipation. No melena or hematochezia.  GENITOURINARY: No dysuria, frequency or hematuria  NEUROLOGICAL: No numbness or weakness  SKIN: No itching, rashes      ICU Vital Signs Last 24 Hrs  T(C): 38.1 (28 May 2022 18:00), Max: 38.1 (28 May 2022 18:00)  T(F): 100.6 (28 May 2022 18:00), Max: 100.6 (28 May 2022 18:00)  HR: 80 (28 May 2022 18:00) (80 - 80)  BP: --  BP(mean): --  ABP: 100/59 (28 May 2022 18:00) (74/50 - 113/69)  ABP(mean): 72 (28 May 2022 18:00) (58 - 88)  RR: 22 (28 May 2022 18:00) (16 - 33)  SpO2: 93% (28 May 2022 18:00) (93% - 99%)      CVP(mm Hg): 7 (22 @ 18:00) (-1 - 32)  CO: 5 (22 @ 04:00) (3.1 - 5)  CI: 2.6 (22 @ 04:00) (1.6 - 2.6)  PA: 70/27 (22 @ 18:00) (55/23 - 90/28)  PA(mean): 42 (22 @ 18:00) (35 - 55)  PA(direct): --  PCWP: 26 (22 @ 08:15) (26 - 26)  LA: --  RA: --  SVR: 1072 (22 @ 04:00) (1072 - 1804)  SVRI: 3495 (22 @ 15:00) (5539 - 3495)  PVR: --  PVRI: --  I&O's Summary    27 May 2022 07:  -  28 May 2022 07:00  --------------------------------------------------------  IN: 1362.4 mL / OUT: 1150 mL / NET: 212.4 mL    28 May 2022 07:  -  28 May 2022 19:45  --------------------------------------------------------  IN: 639.6 mL / OUT: 625 mL / NET: 14.6 mL        CAPILLARY BLOOD GLUCOSE    CAPILLARY BLOOD GLUCOSE      POCT Blood Glucose.: 156 mg/dL (28 May 2022 17:20)      PHYSICAL EXAM:  Constitutional: NAD, well-groomed, well-developed  HEENT: PERRLA, EOMI, no drainage or redness  Neck: supple,  No JVD appreciated   Respiratory: Breath Sounds equal & clear bilaterally to auscultation, no rales/rhonchi/wheezing, no accessory muscle use noted  Cardiovascular: Regular rate, regular rhythm, normal S1, S2; no murmurs or rub  Gastrointestinal: Soft, non-tender, non distended, + bowel sounds  Extremities: BRADFORD x 4, no peripheral edema, no cyanosis, no clubbing   Neurological: A+O x 3; speech clear and intact; no sensory, motor  deficits, normal reflexes  Skin: warm, dry, well perfused    ============================I/O===========================   I&O's Detail    27 May 2022 07:  -  28 May 2022 07:00  --------------------------------------------------------  IN:    IV PiggyBack: 350 mL    Milrinone: 157.7 mL    Milrinone: 27.5 mL    Nitroprusside: 27.2 mL    Oral Fluid: 800 mL  Total IN: 1362.4 mL    OUT:    Voided (mL): 1150 mL  Total OUT: 1150 mL    Total NET: 212.4 mL      28 May 2022 07:  -  28 May 2022 19:45  --------------------------------------------------------  IN:    Milrinone: 99.6 mL    Oral Fluid: 540 mL  Total IN: 639.6 mL    OUT:    Voided (mL): 625 mL  Total OUT: 625 mL    Total NET: 14.6 mL        ============================ LABS =========================                        13.3   10.72 )-----------( 169      ( 28 May 2022 04:41 )             38.8     -    133<L>  |  100  |  42<H>  ----------------------------<  141<H>  4.4   |  23  |  1.66<H>    Ca    8.6      28 May 2022 04:41  Phos  2.2       Mg     2.1         TPro  6.1  /  Alb  3.1<L>  /  TBili  0.8  /  DBili  x   /  AST  19  /  ALT  17  /  AlkPhos  56      Troponin T, High Sensitivity Result: 93 ng/L (22 @ 20:22)    CKMB Units: 2.2 ng/mL (22 @ 20:22)    Creatine Kinase, Serum: 210 U/L (22 @ 20:22)    CPK Mass Assay %: 1.0 % (22 @ 20:22)        LIVER FUNCTIONS - ( 28 May 2022 04:41 )  Alb: 3.1 g/dL / Pro: 6.1 g/dL / ALK PHOS: 56 U/L / ALT: 17 U/L / AST: 19 U/L / GGT: x           PT/INR - ( 26 May 2022 20:22 )   PT: 13.8 sec;   INR: 1.20 ratio         PTT - ( 26 May 2022 20:22 )  PTT:30.3 sec  ABG - ( 28 May 2022 04:15 )  pH, Arterial: 7.44  pH, Blood: x     /  pCO2: 36    /  pO2: 137   / HCO3: 24    / Base Excess: 0.6   /  SaO2: 99.7              Blood Gas Arterial, Lactate: 1.2 mmol/L (22 @ 04:15)  Blood Gas Arterial, Lactate: 1.3 mmol/L (22 @ 14:35)  Blood Gas Arterial, Lactate: 1.0 mmol/L (22 @ 05:00)  Blood Gas Venous - Lactate: 1.0 mmol/L (22 @ 05:00)  Blood Gas Venous - Lactate: 0.9 mmol/L (22 @ 00:15)  Blood Gas Arterial, Lactate: 1.3 mmol/L (22 @ 22:05)  Blood Gas Venous - Lactate: 1.2 mmol/L (22 @ 22:05)  Blood Gas Venous - Lactate: 1.7 mmol/L (22 @ 20:40)  Blood Gas Venous - Lactate: 1.7 mmol/L (22 @ 20:20)  Blood Gas Arterial, Lactate: 1.6 mmol/L (22 @ 20:15)    Urinalysis Basic - ( 28 May 2022 18:58 )    Color: Yellow / Appearance: Clear / S.025 / pH: x  Gluc: x / Ketone: Negative  / Bili: Negative / Urobili: 2 mg/dL   Blood: x / Protein: 30 mg/dL / Nitrite: Negative   Leuk Esterase: Negative / RBC: 16 /hpf / WBC 2 /HPF   Sq Epi: x / Non Sq Epi: 0 /hpf / Bacteria: 0.0      ======================Micro/Rad/Cardio=================  Telemtry: Reviewed   EKG: Reviewed  CXR: Reviewed  Culture: Reviewed   Echo:   Cath:   ======================================================  PAST MEDICAL & SURGICAL HISTORY:  AV block      Essential hypertension      Chronic HFrEF (heart failure with reduced ejection fraction)      Chronic kidney disease, unspecified CKD stage      CAD (coronary artery disease)      HLD (hyperlipidemia)      Type 2 diabetes mellitus      Artificial cardiac pacemaker        ====================ASSESSMENT ==============  67 y/o male w/ PMHx HTN, HLD, type 2 DM, chronic HFrEF (EF 25-30% by Echo), complete heart block s/p PPM (in , upgraded to BiV-ICD in 2016), CAD (s/p recent BERNABE mid LAD at [facility] on 2022), and stage II CKD (baseline Cr ~1.3) presented with near syncope to OSH found to have 41 episodes of VT on device, s/p unsuccessful VT ablation and transferred to [facility] for management of cardiogenic shock and advanced therapy eval.     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  - SPO2 94-95% on room air       ====================CARDIOVASCULAR==================  Cardiogenic shock   - TTE : LV 5.2 cm, LVEF 10-15%, LVOT VTI 10 cm, moderate RV dysfunction, mild AI, minimal MR  - [facility] : patent mLAD stent with slow flow, D1 with 40-50% stenosis  - [facility] : RA 12, PA 70/40 (50), PCWP 22, Milana CO/CI 2.3/1.3, MAP 83 with SVR 2469, PVR 12   - : RA 10, PA 76/35 (49), PCWP 34, PA sat 57% with Milana CO/CI 3.1/1.6, MAP 71 with SVR 1574, PVR 4.8  - Maintain Milrinone .375mcg/kg/min   - monitor hemodynamics and perfusion indices with PA catheter in place  - goal CVP 8-10, has been single digits, holding diuresis  - monitor strict IOs and daily wts   - Undergoing VAD/OHT workup, Will need a CT colo monday or tuesday once patient remains stable on current support.   - low threshold for escalation to IABP if hemodynamics worsen     - HSQ for DVT prophylaxis - s/p EPS on , unable to perform ablation as no substrate found and did not induce VT because of severely low EF   - Interrogation of ICD @Dr Wilson's office : back-to-back episodes of VT @ 200+ bpm all terminating with ATP. Since last cath pt has had 41 VT episodes (all falling into VF zone)  - Normal device function. BIV pacing 97%. No programming changes made  -. c/w Amiodarone 400 mg PO BID load, monitor LFTs   - BB held due to cardiogenic shock  - undergoing OHT/LVAD eval as above   - no further VT on inotropes     CAD   - Chest pain free and compliant with DAPT since last PCI 22  - Cardiac cath @[facility] 22: mLAD 90% s/p BERNABE, LCx mild disease, RCA mild disease s/p diagnostic cardiac cath w/ Dr Wagner on 2022   - Cont ASA and Lipitor   - per discussion with interventional at OSH, ok to DC plavix at this time     aspirin enteric coated 81 milliGRAM(s) Oral daily  atorvastatin 80 milliGRAM(s) Oral at bedtime  aMIOdarone    Tablet 400 milliGRAM(s) Oral two times a day  milrinone Infusion 0.375 MICROgram(s)/kG/Min (8.27 mL/Hr) IV Continuous <Continuous>    ===================HEMATOLOGIC/ONC ===================  - No active issues  - HSQ for DVT ppx     heparin   Injectable 5000 Unit(s) SubCutaneous every 8 hours    ===================== RENAL =========================  JAGRUTI on CKD II  Admitted w/ Cr 2.01 (baseline Cr ~1.3) now trending down   - Avoid nephrotoxic agents, NSAIDs. Renally dose meds.  - monitor strict IOs and continue current cardiac support       ==================== GASTROINTESTINAL===================  - No active issues  - DASH diet w/ 1500ml Fluid restriction  - Cont. PPI and bowel regimen w/ Miralax and Senna  - Will need CT colo this week     pantoprazole    Tablet 40 milliGRAM(s) Oral before breakfast  polyethylene glycol 3350 17 Gram(s) Oral two times a day  senna 2 Tablet(s) Oral at bedtime    =======================    ENDOCRINE  =====================   T2DM (A1C 6.2 on admission)  - Cont. ISS, glucose controlled     GOUT  - c/w colchicine     allopurinol 75 milliGRAM(s) Oral daily  colchicine 0.6 milliGRAM(s) Oral daily  insulin lispro (ADMELOG) corrective regimen sliding scale   SubCutaneous three times a day before meals  insulin lispro (ADMELOG) corrective regimen sliding scale   SubCutaneous at bedtime    ========================INFECTIOUS DISEASE================  - Pt remains afebrile and No leukocytosis   - continue to monitor wbc and fever curve   - monitor off abx for now      Patient requires continuous monitoring with bedside rhythm monitoring, pulse ox monitoring, and intermittent blood gas analysis. Care plan discussed with ICU care team. Patient remained critical and at risk for life threatening decompensation.  Patient seen, examined and plan discussed with CCU team during rounds.     I have personally provided ____ minutes of critical care time excluding time spent on separate procedures, in addition to initial critical care time provided by the CICU Attending, Dr. Leonardo.    By signing my name below, I, Camila Martinez, attest that this documentation has been prepared under the direction and in the presence of Lyric Ashby NP  Electronically signed: Mini Lassiter, 22 @ 19:45    I, Lyric Ashby NP, personally performed the services described in this documentation. all medical record entries made by the scribe were at my direction and in my presence. I have reviewed the chart and agree that the record reflects my personal performance and is accurate and complete  Electronically signed: Lyric Ashby NP          CICU MIDNIGHT NOTE    GOCOOL, LENNOX  MRN-[id number]  Patient is a 66y old  Male who presents with a chief complaint of LVAD/OHT eval (28 May 2022 09:07)    HPI: 66M Mixed Ischemic/NICM Cardiomyopathy (EF 25-30% at baseline, s/p BIV-ICD), CAD (medically managed MIs in ,, Most recent stent in 2022 to mLAD) presented with multiple near syncope, found to have 41 episodes of VT and admitted initially to cardiac telemetry for further evaluation/management. Ischemic evaluation without new disease and VT ablation without substrate to complete ablation.   Transferred from [facility] for further management and evaluation for LVAD vs OHT.     Hospital Course:   Transferred to CCU for further management     24 HOUR EVENTS: Febrile blood cultures sent   Mixed 57.7   Discussed with HF: increase Milrinone @0.5 at 8pn/ leave swan for now and vanco one time dose     ICU Vital Signs Last 24 Hrs  T(C): 38.1 ( Max: 38.1)  HR: 80   ABP: 100/59  (74/50 - 113/69)  ABP(mean): 72 (58 - 88)  RR: 22  (16 - 33)  SpO2: 93%  (93% - 99%)    CVP(mm Hg): 7  CO: 5 (22 @ 04:00) (3.1 - 5)  CI: 2.6 (22 @ 04:00) (1.6 - 2.6)  PA: 70/27 (22 @ 18:00) (55/23 - 90/28)  PA(mean): 42 (22 @ 18:00) (35 - 55)  PCWP: 26 (22 @ 08:15) (26 - 26)  SVR: 1072 (22 @ 04:00) (1072 - 1804)  SVRI: 3495 (22 @ 15:00) (3495 - 3495)    I&O's Summary  IN: 639.6 mL / OUT: 625 mL / NET: 14.6 mL    PHYSICAL EXAM:  Constitutional: NAD, well-groomed, well-developed  HEENT:  no drainage or redness  Neck: supple,  No JVD appreciated   Respiratory: Breath Sounds equal & clear bilaterally to auscultation, no rales/rhonchi/wheezing, no accessory muscle use noted  Cardiovascular: Regular rate, regular rhythm, normal S1, S2; no murmurs or rub  Gastrointestinal: Soft, non-tender, non distended, + bowel sounds  Extremities: BRADFORD x 4, no peripheral edema, no cyanosis, no clubbing   Neurological: A+O x 3; speech clear and intact; no sensory, motor  deficits, normal reflexes    ============================ LABS =========================                        13.3   10.72 )-----------( 169                38.8     133<L>  |  100  |  42<H>  ----------------------------<  141<H>  4.4   |  23  |  1.66<H>    Ca    8.6    Phos  2.2     Mg     2.1    TPro  6.1  /  Alb  3.1<L>  /  TBili  0.8  /  DBili  x   /  AST  19  /  ALT  17  /  AlkPhos  56      Troponin T, High Sensitivity Result: 93 ng/L (22 @ 20:22)  CKMB Units: 2.2 ng/mL (22 @ 20:22)  Creatine Kinase, Serum: 210 U/L (22 @ 20:22)  CPK Mass Assay %: 1.0 % (22 @ 20:22)  LIVER FUNCTIONS - ( 28 May 2022 04:41 )  Alb: 3.1 g/dL / Pro: 6.1 g/dL / ALK PHOS: 56 U/L / ALT: 17 U/L / AST: 19 U/L / GGT: x         PT/INR - ( 26 May 2022 20:22 )   PT: 13.8 sec;   INR: 1.20 ratio    PTT - ( 26 May 2022 20:22 )  PTT:30.3 sec    ABG - ( 28 May 2022 04:15 )  pH, Arterial: 7.44 /  pCO2: 36    /  pO2: 137   / HCO3: 24    / Base Excess: 0.6   /  SaO2: 99.7    Lactate 1.2<1.3<1.0    Urinalysis Basic - ( 28 May 2022 18:58 )  Color: Yellow / Appearance: Clear / S.025 / pH: x  Gluc: x / Ketone: Negative  / Bili: Negative / Urobili: 2 mg/dL   Blood: x / Protein: 30 mg/dL / Nitrite: Negative   Leuk Esterase: Negative / RBC: 16 /hpf / WBC 2 /HPF   Sq Epi: x / Non Sq Epi: 0 /hpf / Bacteria: 0.0    ======================================================  PAST MEDICAL & SURGICAL HISTORY:  AV block  Essential hypertension  Chronic HFrEF (heart failure with reduced ejection fraction)  Chronic kidney disease, unspecified CKD stage  CAD (coronary artery disease)  HLD (hyperlipidemia)  Type 2 diabetes mellitus  Artificial cardiac pacemaker

## 2022-05-28 NOTE — PROGRESS NOTE ADULT - PROBLEM SELECTOR PLAN 3
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

## 2022-05-28 NOTE — PROGRESS NOTE ADULT - SUBJECTIVE AND OBJECTIVE BOX
HEART FAILURE FELLOW PROGRESS NOTE    Subjective:    No acute events overnight.   ROS + R knee pain and swelling similar to symptoms he gets when he has acute gout flare    Current Medications:   allopurinol 75 milliGRAM(s) Oral daily  aMIOdarone    Tablet 400 milliGRAM(s) Oral two times a day  aspirin enteric coated 81 milliGRAM(s) Oral daily  atorvastatin 80 milliGRAM(s) Oral at bedtime  chlorhexidine 4% Liquid 1 Application(s) Topical <User Schedule>  dextrose 5%. 1000 milliLiter(s) IV Continuous <Continuous>  dextrose 5%. 1000 milliLiter(s) IV Continuous <Continuous>  dextrose 50% Injectable 25 Gram(s) IV Push once  dextrose 50% Injectable 12.5 Gram(s) IV Push once  dextrose 50% Injectable 25 Gram(s) IV Push once  dextrose Oral Gel 15 Gram(s) Oral once PRN  glucagon  Injectable 1 milliGRAM(s) IntraMuscular once  heparin   Injectable 5000 Unit(s) SubCutaneous every 8 hours  influenza   Vaccine 0.5 milliLiter(s) IntraMuscular once  insulin lispro (ADMELOG) corrective regimen sliding scale   SubCutaneous three times a day before meals  insulin lispro (ADMELOG) corrective regimen sliding scale   SubCutaneous at bedtime  milrinone Infusion 0.375 MICROgram(s)/kG/Min IV Continuous <Continuous>  pantoprazole    Tablet 40 milliGRAM(s) Oral before breakfast  polyethylene glycol 3350 17 Gram(s) Oral daily  senna 2 Tablet(s) Oral at bedtime      REVIEW OF SYSTEMS:  CONSTITUTIONAL: No weakness, fevers or chills  EYES/ENT: No visual changes;  No dysphagia  NECK: No pain or stiffness  RESPIRATORY: No cough, wheezing, hemoptysis; No shortness of breath  CARDIOVASCULAR: No chest pain or palpitations; No lower extremity edema  GASTROINTESTINAL: No abdominal or epigastric pain. No nausea, vomiting, or hematemesis; No diarrhea or constipation. No melena or hematochezia.  BACK: No back pain  GENITOURINARY: No dysuria, frequency or hematuria  NEUROLOGICAL: No numbness or weakness  SKIN: No itching, burning, rashes, or lesions   MSK: R knee pain and swelling  All other review of systems is negative unless indicated above.    Physical Exam:  T(F): 97 (05-28), Max: 99.1 (05-28)  HR: 80 (05-28) (69 - 80)  BP: --  BP(mean): --  ABP: 100/64 (05-28) (74/50 - 124/79)  ABP(mean): 76 (05-28) (58 - 94)  RR: 16 (05-28)  SpO2: 95% (05-28)  GENERAL: No acute distress, well-developed  HEAD:  Atraumatic, Normocephalic  ENT: EOMI, PERRLA, conjunctiva and sclera clear, Neck supple, No JVD, moist mucosa  CHEST/LUNG: Clear to auscultation bilaterally; No wheeze, equal breath sounds bilaterally   BACK: No spinal tenderness  HEART: Regular rate and rhythm; No murmurs, rubs, or gallops  ABDOMEN: Soft, Nontender, Nondistended; Bowel sounds present  EXTREMITIES:  No clubbing, cyanosis, or edema  PSYCH: Nl behavior, nl affect  NEUROLOGY: AAOx3, non-focal, cranial nerves intact  SKIN: Normal color, No rashes or lesions.    Cardiovascular Diagnostic Testing: personally reviewed    CXR: Personally reviewed    Labs: Personally reviewed                        13.3   10.72 )-----------( 169      ( 28 May 2022 04:41 )             38.8     05-28    133<L>  |  100  |  42<H>  ----------------------------<  141<H>  4.4   |  23  |  1.66<H>    Ca    8.6      28 May 2022 04:41  Phos  2.2     05-28  Mg     2.1     05-28    TPro  6.1  /  Alb  3.1<L>  /  TBili  0.8  /  DBili  x   /  AST  19  /  ALT  17  /  AlkPhos  56  05-28    PT/INR - ( 26 May 2022 20:22 )   PT: 13.8 sec;   INR: 1.20 ratio         PTT - ( 26 May 2022 20:22 )  PTT:30.3 sec    CARDIAC MARKERS ( 26 May 2022 20:22 )  93 ng/L / x     / x     / 210 U/L / x     / 2.2 ng/mL  CARDIAC MARKERS ( 21 May 2022 16:53 )  x     / x     / 0.02 ng/mL / 122 U/L / x     / 3.5 ng/mL        Serum Pro-Brain Natriuretic Peptide: 3102 pg/mL (05-27 @ 15:23)  Serum Pro-Brain Natriuretic Peptide: 4206 pg/mL (05-26 @ 20:22)  Serum Pro-Brain Natriuretic Peptide: 2846 pg/mL (05-24 @ 17:22)        Thyroid Stimulating Hormone, Serum: 1.93 uIU/mL (05-27 @ 17:02)

## 2022-05-28 NOTE — PROGRESS NOTE ADULT - PROBLEM SELECTOR PLAN 6
- upon arrival was noted to have JAGRUTI, likely related to low cardiac output - upon arrival was noted to have JAGRUTI, likely related to low cardiac output and is now improving  - if GFR < 45 will consult nephrology

## 2022-05-28 NOTE — PROGRESS NOTE ADULT - PROBLEM SELECTOR PLAN 4
- S/p PCI in April 2022  - Per interventional cardiology daniela Ansari to discontinue plavix  - C/w ASA 81mg daily and high intensity statin

## 2022-05-28 NOTE — CHART NOTE - NSCHARTNOTEFT_GEN_A_CORE
Notified by RN patient febrile to 100.6F. Patient admitted for cardiogenic shock and undergoing LVAD/OHT workup. Patient currently has RIJ Intro placed at OSH 5/26, Kegley placed in cath lab 5/27, [place] placed at OSH 5/26. Notified CICU attending who discussed with HF attending. Will send repeat MVO2 and remove Cordis/Intro. Endorsed to night team to replace only cordis. Notified by RN patient febrile to 100.6F. Patient admitted for cardiogenic shock and undergoing LVAD/OHT workup. Patient currently has RIJ Intro placed at OSH 5/26, [place] placed in cath lab 5/27, [place] placed at OSH 5/26. Notified CICU attending who discussed with HF attending. Will send repeat MVO2 and remove Cordis/Intro. Endorsed to night team to replace only cordis. Infectious workup including RVP, UA, and BCx x2 ordered and sent by RN.

## 2022-05-28 NOTE — PROGRESS NOTE ADULT - PROBLEM SELECTOR PLAN 5
- hx of VT s/p unsuccessful VT ablation  - remains on amio 400 mg PO BID  - since being admitted to [facility] has not had any episodes of VT, currently tolerating milrinone.

## 2022-05-28 NOTE — PROGRESS NOTE ADULT - ASSESSMENT
====================ASSESSMENT ==============  65 y/o male w/ PMHx HTN, HLD, type 2 DM, chronic HFrEF (EF 25-30% by Echo), complete heart block s/p PPM (in 2006, upgraded to BiV-ICD in 09/2016), CAD (s/p recent BERNABE mid LAD at [facility] on 04/18/2022), and stage II CKD (baseline Cr ~1.3) presented with near syncope to OSH found to have 41 episodes of VT on device, s/p unsuccessful VT ablation and transferred to [facility] for management of cardiogenic shock and advanced therapy eval.     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  - cont milrinone as above, may require LV unloading   - PVR<3   - SPO2 94-95% on room air     ====================CARDIOVASCULAR==================  Cardiogenic shock   TTE 5/21: LV 5.2 cm, LVEF 10-15%, LVOT VTI 10 cm, moderate RV dysfunction, mild AI, minimal MR  [facility] 5/23: patent mLAD stent with slow flow, D1 with 40-50% stenosis  [facility] 5/26: RA 12, PA 70/40 (50), PCWP 22, Milana CO/CI 2.3/1.3, MAP 83 with SVR 2469, PVR 12 PERSAUD  [facility] 5/27: RA 10, PA 76/35 (49), PCWP 34, PA sat 57% with Milana CO/CI 3.1/1.6, MAP 71 with SVR 1574, PVR 4.8  - Increase Milrinone @ 0.5   - monitor hemodynamics and perfusion indices with PA catheter in place  - goal CVP 8-10, has been single digits, holding diuresis  - monitor strict IOs and daily wts   - Undergoing VAD/OHT workup, Will need a CT colo monday or tuesday once patient remains stable on current support.   - low threshold for escalation to IABP if hemodynamics worsen   - HSQ for DVT prophylaxis - s/p EPS on 5/24, unable to perform ablation as no substrate found and did not induce VT because of severely low EF   - Interrogation of ICD @Dr Wilson's office 5/20: back-to-back episodes of VT @ 200+ bpm all terminating with ATP. Since last cath pt has had 41 VT episodes (all falling into VF zone)  - Normal device function. BIV pacing 97%. No programming changes made  -. c/w Amiodarone 400 mg PO BID load, monitor LFTs   - BB held due to cardiogenic shock  - undergoing OHT/LVAD eval as above   - no further VT on inotropes     CAD   - Chest pain free and compliant with DAPT since last PCI 4/18/22  - Cardiac cath @[facility] 4/18/22: mLAD 90% s/p BERNABE, LCx mild disease, RCA mild disease s/p diagnostic cardiac cath w/ Dr Wagner on 05/23/2022   - Cont ASA and Lipitor   - per discussion with interventional at OSH, ok to DC plavix at this time     ===================HEMATOLOGIC/ONC ===================  - No active issues  - HSQ for DVT ppx     ===================== RENAL =========================  JAGRUTI on CKD II  Admitted w/ Cr 2.01 (baseline Cr ~1.3) now trending down   - Avoid nephrotoxic agents, NSAIDs. Renally dose meds.  - monitor strict IOs and continue current cardiac support     ==================== GASTROINTESTINAL===================  - No active issues  - DASH diet w/ 1500ml Fluid restriction  - Cont. PPI and bowel regimen w/ Miralax and Senna  - Will need CT colo this week     =======================    ENDOCRINE  =====================   T2DM (A1C 6.2 on admission)  - Cont. ISS, glucose controlled     GOUT  - Cont. colchicine and allopurinol     ========================INFECTIOUS DISEASE================  Febrile this evening s/p blood cultures   vanco 1g IVPB x one time dose   - If fever persists, may remove cordis and swan       Patient requires continuous monitoring with bedside rhythm monitoring, pulse ox monitoring, and intermittent blood gas analysis. Care plan discussed with ICU care team. Patient remained critical and at risk for life threatening decompensation.  Patient seen, examined and plan discussed with CCU team during rounds.     I have personally provided _35___ minutes of critical care time excluding time spent on separate procedures, in addition to initial critical care time provided by the CICU Attending, Dr. Leonardo.    By signing my name below, I, Camila Martinez, attest that this documentation has been prepared under the direction and in the presence of Lyric Ashby NP  Electronically signed: Mini Lassiter, 05-28-22 @ 19:45    I, Lyric Ashby NP, personally performed the services described in this documentation. all medical record entries made by the scribe were at my direction and in my presence. I have reviewed the chart and agree that the record reflects my personal performance and is accurate and complete  Electronically signed: Lyric Ashby NP

## 2022-05-29 DIAGNOSIS — M10.9 GOUT, UNSPECIFIED: ICD-10-CM

## 2022-05-29 DIAGNOSIS — R50.9 FEVER, UNSPECIFIED: ICD-10-CM

## 2022-05-29 LAB
ALBUMIN SERPL ELPH-MCNC: 3.2 G/DL — LOW (ref 3.3–5)
ALP SERPL-CCNC: 56 U/L — SIGNIFICANT CHANGE UP (ref 40–120)
ALT FLD-CCNC: 15 U/L — SIGNIFICANT CHANGE UP (ref 10–45)
ANION GAP SERPL CALC-SCNC: 9 MMOL/L — SIGNIFICANT CHANGE UP (ref 5–17)
AST SERPL-CCNC: 19 U/L — SIGNIFICANT CHANGE UP (ref 10–40)
BASE EXCESS BLDMV CALC-SCNC: -0.3 MMOL/L — SIGNIFICANT CHANGE UP (ref -3–3)
BASE EXCESS BLDMV CALC-SCNC: -0.8 MMOL/L — SIGNIFICANT CHANGE UP (ref -3–3)
BASOPHILS # BLD AUTO: 0.04 K/UL — SIGNIFICANT CHANGE UP (ref 0–0.2)
BASOPHILS NFR BLD AUTO: 0.3 % — SIGNIFICANT CHANGE UP (ref 0–2)
BILIRUB SERPL-MCNC: 1.1 MG/DL — SIGNIFICANT CHANGE UP (ref 0.2–1.2)
BUN SERPL-MCNC: 33 MG/DL — HIGH (ref 7–23)
CALCIUM SERPL-MCNC: 8.7 MG/DL — SIGNIFICANT CHANGE UP (ref 8.4–10.5)
CHLORIDE SERPL-SCNC: 100 MMOL/L — SIGNIFICANT CHANGE UP (ref 96–108)
CO2 BLDMV-SCNC: 25 MMOL/L — SIGNIFICANT CHANGE UP (ref 21–29)
CO2 BLDMV-SCNC: 26 MMOL/L — SIGNIFICANT CHANGE UP (ref 21–29)
CO2 SERPL-SCNC: 22 MMOL/L — SIGNIFICANT CHANGE UP (ref 22–31)
CREAT SERPL-MCNC: 1.43 MG/DL — HIGH (ref 0.5–1.3)
EGFR: 55 ML/MIN/1.73M2 — LOW
EOSINOPHIL # BLD AUTO: 0.19 K/UL — SIGNIFICANT CHANGE UP (ref 0–0.5)
EOSINOPHIL NFR BLD AUTO: 1.6 % — SIGNIFICANT CHANGE UP (ref 0–6)
GAS PNL BLDA: SIGNIFICANT CHANGE UP
GAS PNL BLDMV: SIGNIFICANT CHANGE UP
GAS PNL BLDMV: SIGNIFICANT CHANGE UP
GLUCOSE BLDC GLUCOMTR-MCNC: 119 MG/DL — HIGH (ref 70–99)
GLUCOSE BLDC GLUCOMTR-MCNC: 132 MG/DL — HIGH (ref 70–99)
GLUCOSE BLDC GLUCOMTR-MCNC: 149 MG/DL — HIGH (ref 70–99)
GLUCOSE BLDC GLUCOMTR-MCNC: 182 MG/DL — HIGH (ref 70–99)
GLUCOSE SERPL-MCNC: 154 MG/DL — HIGH (ref 70–99)
HCO3 BLDMV-SCNC: 24 MMOL/L — SIGNIFICANT CHANGE UP (ref 20–28)
HCO3 BLDMV-SCNC: 24 MMOL/L — SIGNIFICANT CHANGE UP (ref 20–28)
HCT VFR BLD CALC: 40.2 % — SIGNIFICANT CHANGE UP (ref 39–50)
HGB BLD-MCNC: 13.5 G/DL — SIGNIFICANT CHANGE UP (ref 13–17)
HOROWITZ INDEX BLDMV+IHG-RTO: 28 — SIGNIFICANT CHANGE UP
HOROWITZ INDEX BLDMV+IHG-RTO: 28 — SIGNIFICANT CHANGE UP
IMM GRANULOCYTES NFR BLD AUTO: 0.4 % — SIGNIFICANT CHANGE UP (ref 0–1.5)
LYMPHOCYTES # BLD AUTO: 1.59 K/UL — SIGNIFICANT CHANGE UP (ref 1–3.3)
LYMPHOCYTES # BLD AUTO: 13.4 % — SIGNIFICANT CHANGE UP (ref 13–44)
MAGNESIUM SERPL-MCNC: 2.4 MG/DL — SIGNIFICANT CHANGE UP (ref 1.6–2.6)
MCHC RBC-ENTMCNC: 30.1 PG — SIGNIFICANT CHANGE UP (ref 27–34)
MCHC RBC-ENTMCNC: 33.6 GM/DL — SIGNIFICANT CHANGE UP (ref 32–36)
MCV RBC AUTO: 89.5 FL — SIGNIFICANT CHANGE UP (ref 80–100)
MONOCYTES # BLD AUTO: 1.02 K/UL — HIGH (ref 0–0.9)
MONOCYTES NFR BLD AUTO: 8.6 % — SIGNIFICANT CHANGE UP (ref 2–14)
NEUTROPHILS # BLD AUTO: 9.01 K/UL — HIGH (ref 1.8–7.4)
NEUTROPHILS NFR BLD AUTO: 75.7 % — SIGNIFICANT CHANGE UP (ref 43–77)
NRBC # BLD: 0 /100 WBCS — SIGNIFICANT CHANGE UP (ref 0–0)
O2 CT VFR BLD CALC: 45 MMHG — SIGNIFICANT CHANGE UP (ref 30–65)
O2 CT VFR BLD CALC: 47 MMHG — SIGNIFICANT CHANGE UP (ref 30–65)
PCO2 BLDMV: 38 MMHG — SIGNIFICANT CHANGE UP (ref 30–65)
PCO2 BLDMV: 41 MMHG — SIGNIFICANT CHANGE UP (ref 30–65)
PH BLDMV: 7.38 — SIGNIFICANT CHANGE UP (ref 7.32–7.45)
PH BLDMV: 7.41 — SIGNIFICANT CHANGE UP (ref 7.32–7.45)
PHOSPHATE SERPL-MCNC: 3.7 MG/DL — SIGNIFICANT CHANGE UP (ref 2.5–4.5)
PLATELET # BLD AUTO: 183 K/UL — SIGNIFICANT CHANGE UP (ref 150–400)
POTASSIUM SERPL-MCNC: 4.2 MMOL/L — SIGNIFICANT CHANGE UP (ref 3.5–5.3)
POTASSIUM SERPL-SCNC: 4.2 MMOL/L — SIGNIFICANT CHANGE UP (ref 3.5–5.3)
PROT SERPL-MCNC: 6.4 G/DL — SIGNIFICANT CHANGE UP (ref 6–8.3)
RBC # BLD: 4.49 M/UL — SIGNIFICANT CHANGE UP (ref 4.2–5.8)
RBC # FLD: 14.7 % — HIGH (ref 10.3–14.5)
SAO2 % BLDMV: 73.8 — SIGNIFICANT CHANGE UP (ref 60–90)
SAO2 % BLDMV: 79.2 — SIGNIFICANT CHANGE UP (ref 60–90)
SODIUM SERPL-SCNC: 131 MMOL/L — LOW (ref 135–145)
WBC # BLD: 11.9 K/UL — HIGH (ref 3.8–10.5)
WBC # FLD AUTO: 11.9 K/UL — HIGH (ref 3.8–10.5)

## 2022-05-29 PROCEDURE — 99292 CRITICAL CARE ADDL 30 MIN: CPT | Mod: GC,25

## 2022-05-29 PROCEDURE — 99291 CRITICAL CARE FIRST HOUR: CPT | Mod: GC

## 2022-05-29 PROCEDURE — 99291 CRITICAL CARE FIRST HOUR: CPT | Mod: GC,25

## 2022-05-29 PROCEDURE — 93010 ELECTROCARDIOGRAM REPORT: CPT

## 2022-05-29 PROCEDURE — 71045 X-RAY EXAM CHEST 1 VIEW: CPT | Mod: 26,76

## 2022-05-29 RX ORDER — HYDRALAZINE HCL 50 MG
10 TABLET ORAL EVERY 8 HOURS
Refills: 0 | Status: DISCONTINUED | OUTPATIENT
Start: 2022-05-29 | End: 2022-05-30

## 2022-05-29 RX ORDER — SODIUM NITROPRUSSIDE 50 MG/2ML
0.3 INJECTION INTRAVENOUS
Qty: 100 | Refills: 0 | Status: DISCONTINUED | OUTPATIENT
Start: 2022-05-29 | End: 2022-05-29

## 2022-05-29 RX ADMIN — MILRINONE LACTATE 11 MICROGRAM(S)/KG/MIN: 1 INJECTION, SOLUTION INTRAVENOUS at 08:16

## 2022-05-29 RX ADMIN — HEPARIN SODIUM 5000 UNIT(S): 5000 INJECTION INTRAVENOUS; SUBCUTANEOUS at 14:03

## 2022-05-29 RX ADMIN — CHLORHEXIDINE GLUCONATE 1 APPLICATION(S): 213 SOLUTION TOPICAL at 01:03

## 2022-05-29 RX ADMIN — Medication 25 GRAM(S): at 00:10

## 2022-05-29 RX ADMIN — Medication 75 MILLIGRAM(S): at 11:20

## 2022-05-29 RX ADMIN — LIDOCAINE 1 PATCH: 4 CREAM TOPICAL at 22:00

## 2022-05-29 RX ADMIN — Medication 10 MILLIGRAM(S): at 11:26

## 2022-05-29 RX ADMIN — Medication 63.75 MILLIMOLE(S): at 00:32

## 2022-05-29 RX ADMIN — AMIODARONE HYDROCHLORIDE 400 MILLIGRAM(S): 400 TABLET ORAL at 05:56

## 2022-05-29 RX ADMIN — Medication 10 MILLIGRAM(S): at 21:28

## 2022-05-29 RX ADMIN — HEPARIN SODIUM 5000 UNIT(S): 5000 INJECTION INTRAVENOUS; SUBCUTANEOUS at 21:28

## 2022-05-29 RX ADMIN — LIDOCAINE 1 PATCH: 4 CREAM TOPICAL at 11:24

## 2022-05-29 RX ADMIN — MILRINONE LACTATE 11 MICROGRAM(S)/KG/MIN: 1 INJECTION, SOLUTION INTRAVENOUS at 01:14

## 2022-05-29 RX ADMIN — PANTOPRAZOLE SODIUM 40 MILLIGRAM(S): 20 TABLET, DELAYED RELEASE ORAL at 06:00

## 2022-05-29 RX ADMIN — Medication 2: at 12:19

## 2022-05-29 RX ADMIN — Medication 0.6 MILLIGRAM(S): at 11:13

## 2022-05-29 RX ADMIN — Medication 1000 MILLIGRAM(S): at 00:00

## 2022-05-29 RX ADMIN — ATORVASTATIN CALCIUM 80 MILLIGRAM(S): 80 TABLET, FILM COATED ORAL at 21:28

## 2022-05-29 RX ADMIN — HEPARIN SODIUM 5000 UNIT(S): 5000 INJECTION INTRAVENOUS; SUBCUTANEOUS at 05:56

## 2022-05-29 RX ADMIN — LIDOCAINE 1 PATCH: 4 CREAM TOPICAL at 00:00

## 2022-05-29 RX ADMIN — Medication 81 MILLIGRAM(S): at 11:13

## 2022-05-29 RX ADMIN — Medication 40 MILLIGRAM(S): at 00:34

## 2022-05-29 RX ADMIN — LIDOCAINE 1 PATCH: 4 CREAM TOPICAL at 20:20

## 2022-05-29 RX ADMIN — AMIODARONE HYDROCHLORIDE 400 MILLIGRAM(S): 400 TABLET ORAL at 17:07

## 2022-05-29 NOTE — PROGRESS NOTE ADULT - PROBLEM SELECTOR PLAN 6
- upon arrival was noted to have JAGRUTI, likely related to low cardiac output and is now improving  - if GFR < 45 will consult nephrology - upon arrival was noted to have JAGRUTI, likely related to low cardiac output and is now improving

## 2022-05-29 NOTE — PROGRESS NOTE ADULT - ASSESSMENT
67 y/o male w/ PMHx HTN, HLD, type 2 DM, chronic HFrEF (EF 25-30% by Echo), complete heart block s/p PPM (in 2006, upgraded to BiV-ICD in 09/2016), CAD (s/p recent BERNABE mid LAD at [facility] on 04/18/2022), and stage II CKD (baseline Cr ~1.3) presented with near syncope to OSH found to have 41 episodes of VT on device, s/p unsuccessful VT ablation and transferred to [facility] for management of cardiogenic shock and advanced therapy eval.     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  - SPO2 94-95% on room air     Cardiovascular  # Cardiogenic shock   - TTE 5/21: LV 5.2 cm, LVEF 10-15%, LVOT VTI 10 cm, moderate RV dysfunction, mild AI, minimal MR  - [facility] 5/23: patent mLAD stent with slow flow, D1 with 40-50% stenosis  - [facility] 5/26: RA 12, PA 70/40 (50), PCWP 22, Milana CO/CI 2.3/1.3, MAP 83 with SVR 2469, PVR 12 PERSAUD  - 5/27: RA 10, PA 76/35 (49), PCWP 34, PA sat 57% with Milana CO/CI 3.1/1.6, MAP 71 with SVR 1574, PVR 4.8  - Maintain Milrinone .375mcg/kg/min   - monitor hemodynamics and perfusion indices with PA catheter in place  - goal CVP 8-10, has been single digits, holding diuresis  - monitor strict IOs and daily wts   - Undergoing VAD/OHT workup, Will need a CT colo monday or tuesday once patient remains stable on current support.   - low threshold for escalation to IABP if hemodynamics worsen     # VTach   - s/p EPS on 5/24, unable to perform ablation as no substrate found and did not induce VT because of severely low EF   - Interrogation of ICD @Dr Wilson's office 5/20: back-to-back episodes of VT @ 200+ bpm all terminating with ATP. Since last cath pt has had 41 VT episodes (all falling into VF zone)  - Normal device function. BIV pacing 97%. No programming changes made  -. c/w Amiodarone 400 mg PO BID load, monitor LFTs   - BB held due to cardiogenic shock  - undergoing OHT/LVAD eval as above   - no further VT on inotropes     # CAD   - Chest pain free and compliant with DAPT since last PCI 4/18/22  - Cardiac cath @[facility] 4/18/22: mLAD 90% s/p BERNABE, LCx mild disease, RCA mild disease s/p diagnostic cardiac cath w/ Dr Wagner on 05/23/2022   - Cont ASA and Lipitor   - per discussion with interventional at OSH, ok to DC plavix at this time     GI  - No active issues  - DASH diet w/ 1500ml Fluid restriction  - Cont. PPI and bowel regimen   - Will need CT colo this week     Renal  # JAGRUTI on CKD II  Admitted w/ Cr 2.01 (baseline Cr ~1.3) now trending down   - Avoid nephrotoxic agents, NSAIDs. Renally dose meds.  - monitor strict IOs and continue current cardiac support     Heme/Onc  - No active issues  - HSQ for DVT ppx     Endo   # T2DM (A1C 6.2 on admission)  - Cont. ISS, glucose controlled     ID  # Febrile yesterday to 100.6 F  - infectious workup sent including RVP (negative), UA(negative), and BCx x2  - vanco x1 given  - trend fever and wbc  - discuss central access in place     Lines  COCO Buck (5/26 OSH)  IHREN Bonilla (OSH 5/26)  [place] 5/27   67 y/o male w/ PMHx HTN, HLD, type 2 DM, chronic HFrEF (EF 25-30% by Echo), complete heart block s/p PPM (in 2006, upgraded to BiV-ICD in 09/2016), CAD (s/p recent BERNABE mid LAD at [facility] on 04/18/2022), and stage II CKD (baseline Cr ~1.3) presented with near syncope to OSH found to have 41 episodes of VT on device, s/p unsuccessful VT ablation and transferred to [facility] for management of cardiogenic shock and advanced therapy eval.     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  - SPO2 94-95% on room air     Cardiovascular  # Cardiogenic shock   - TTE 5/21: LV 5.2 cm, LVEF 10-15%, LVOT VTI 10 cm, moderate RV dysfunction, mild AI, minimal MR  - [facility] 5/23: patent mLAD stent with slow flow, D1 with 40-50% stenosis  - [facility] 5/26: RA 12, PA 70/40 (50), PCWP 22, Milana CO/CI 2.3/1.3, MAP 83 with SVR 2469, PVR 12 PERSAUD  - 5/27: RA 10, PA 76/35 (49), PCWP 34, PA sat 57% with Milana CO/CI 3.1/1.6, MAP 71 with SVR 1574, PVR 4.8  - Maintain Milrinone .5mcg/kg/min   - monitor hemodynamics and perfusion indices with PA catheter in place  - goal CVP 8-10, has been single digits, holding diuresis  - monitor strict IOs and daily wts   - Undergoing VAD/OHT workup, Will need a CT colo monday or tuesday once patient remains stable on current support.   - low threshold for escalation to IABP if hemodynamics worsen     # VTach   - s/p EPS on 5/24, unable to perform ablation as no substrate found and did not induce VT because of severely low EF   - Interrogation of ICD @Dr Wilson's office 5/20: back-to-back episodes of VT @ 200+ bpm all terminating with ATP. Since last cath pt has had 41 VT episodes (all falling into VF zone)  - Normal device function. BIV pacing 97%. No programming changes made  -. c/w Amiodarone 400 mg PO BID load, monitor LFTs   - BB held due to cardiogenic shock  - undergoing OHT/LVAD eval as above   - no further VT on inotropes     # CAD   - Chest pain free and compliant with DAPT since last PCI 4/18/22  - Cardiac cath @[facility] 4/18/22: mLAD 90% s/p BERNABE, LCx mild disease, RCA mild disease s/p diagnostic cardiac cath w/ Dr Wagner on 05/23/2022   - Cont ASA and Lipitor   - per discussion with interventional at OSH, ok to DC plavix at this time     GI  - No active issues  - DASH diet w/ 1500ml Fluid restriction  - Cont. PPI and bowel regimen, last BM 5/28  - Will need CT colo this week     Renal  # JAGRUTI on CKD II  Admitted w/ Cr 2.01 (baseline Cr ~1.3) now trending down   - Avoid nephrotoxic agents, NSAIDs. Renally dose meds.  - monitor strict IOs and continue current cardiac support     Heme/Onc  - No active issues  - HSQ for DVT ppx     Endo   # T2DM (A1C 6.2 on admission)  - Cont. ISS, glucose controlled     ID  # Febrile yesterday to 100.6 F  - infectious workup sent including RVP (negative), UA(negative), and BCx x2  - vanco x1 given  - trend fever and wbc  - if patient spikes another fever will need to remove swan and central line     Lines  COCO Buck (5/26 OSH)  HIREN Bonilla (OSH 5/26)  [place] 5/27   65 y/o male w/ PMHx HTN, HLD, type 2 DM, chronic HFrEF (EF 25-30% by Echo), complete heart block s/p PPM (in 2006, upgraded to BiV-ICD in 09/2016), CAD (s/p recent BERNABE mid LAD at [facility] on 04/18/2022), and stage II CKD (baseline Cr ~1.3) presented with near syncope to OSH found to have 41 episodes of VT on device, s/p unsuccessful VT ablation and transferred to [facility] for management of cardiogenic shock and advanced therapy eval.     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  - SPO2 94-95% on room air     Cardiovascular  # Cardiogenic shock   - TTE 5/21: LV 5.2 cm, LVEF 10-15%, LVOT VTI 10 cm, moderate RV dysfunction, mild AI, minimal MR  - [facility] 5/23: patent mLAD stent with slow flow, D1 with 40-50% stenosis  - [facility] 5/26: RA 12, PA 70/40 (50), PCWP 22, Milana CO/CI 2.3/1.3, MAP 83 with SVR 2469, PVR 12 PERSAUD  - 5/27: RA 10, PA 76/35 (49), PCWP 34, PA sat 57% with Milana CO/CI 3.1/1.6, MAP 71 with SVR 1574, PVR 4.8  - Maintain Milrinone .5mcg/kg/min   - monitor hemodynamics and perfusion indices with PA catheter in place  - goal CVP 8-10, has been single digits, holding diuresis  - monitor strict IOs and daily wts   - Undergoing VAD/OHT workup, Will need a CT colo monday or tuesday once patient remains stable on current support.   - low threshold for escalation to IABP if hemodynamics worsen     # VTach   - s/p EPS on 5/24, unable to perform ablation as no substrate found and did not induce VT because of severely low EF   - Interrogation of ICD @Dr Wilson's office 5/20: back-to-back episodes of VT @ 200+ bpm all terminating with ATP. Since last cath pt has had 41 VT episodes (all falling into VF zone)  - Normal device function. BIV pacing 97%. No programming changes made  -. c/w Amiodarone 400 mg PO BID load, monitor LFTs   - BB held due to cardiogenic shock  - undergoing OHT/LVAD eval as above   - no further VT on inotropes     # CAD   - Chest pain free and compliant with DAPT since last PCI 4/18/22  - Cardiac cath @[facility] 4/18/22: mLAD 90% s/p BERNABE, LCx mild disease, RCA mild disease s/p diagnostic cardiac cath w/ Dr Wagner on 05/23/2022   - Cont ASA and Lipitor   - per discussion with interventional at OSH, ok to DC plavix at this time     GI  - No active issues  - DASH diet w/ 1500ml Fluid restriction  - Cont. PPI and bowel regimen, last BM 5/28  - Will need CT colo this week     Renal  # JAGRUTI on CKD II  Admitted w/ Cr 2.01 (baseline Cr ~1.3) now trending down   - Avoid nephrotoxic agents, NSAIDs. Renally dose meds.  - monitor strict IOs and continue current cardiac support     Heme/Onc  - No active issues  - HSQ for DVT ppx     Endo   # T2DM (A1C 6.2 on admission)  - Cont. ISS, glucose controlled     MSK  # Gout flare, right knee   - continue renally dosed colchicine and allopurinol   - started on prednisone 40mg x5 days     ID  # Febrile yesterday to 100.6 F  - infectious workup sent including RVP (negative), UA(negative), and BCx x2  - vanco x1 given  - trend fever and wbc  - if patient spikes another fever will need to remove swan and central line     Lines  COCO Buck (5/26 OSH)  HIREN Bonilla (OSH 5/26)  [place] 5/27   65 y/o male w/ PMHx HTN, HLD, type 2 DM, chronic HFrEF (EF 25-30% by Echo), complete heart block s/p PPM (in 2006, upgraded to BiV-ICD in 09/2016), CAD (s/p recent BERNABE mid LAD at [facility] on 04/18/2022), and stage II CKD (baseline Cr ~1.3) presented with near syncope to OSH found to have 41 episodes of VT on device, s/p unsuccessful VT ablation and transferred to [facility] for management of cardiogenic shock and advanced therapy eval.     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  - SPO2 94-95% on room air     Cardiovascular  # Cardiogenic shock   - TTE 5/21: LV 5.2 cm, LVEF 10-15%, LVOT VTI 10 cm, moderate RV dysfunction, mild AI, minimal MR  - [facility] 5/23: patent mLAD stent with slow flow, D1 with 40-50% stenosis  - [facility] 5/26: RA 12, PA 70/40 (50), PCWP 22, Milana CO/CI 2.3/1.3, MAP 83 with SVR 2469, PVR 12 PERSAUD  - 5/27: RA 10, PA 76/35 (49), PCWP 34, PA sat 57% with Milana CO/CI 3.1/1.6, MAP 71 with SVR 1574, PVR 4.8  - Maintain Milrinone .5mcg/kg/min   - monitor hemodynamics and perfusion indices with PA catheter in place  - will need to remove swan/cordis given fever; will do a bedside nipride study with HF attending prior to removal and calculate PVR    - goal CVP 8-10, has been single digits, holding diuresis  - monitor strict IOs and daily wts   - Undergoing VAD/OHT workup, Will need a CT colo monday or tuesday once patient remains stable on current support.   - low threshold for escalation to IABP if hemodynamics worsen     # VTach   - s/p EPS on 5/24, unable to perform ablation as no substrate found and did not induce VT because of severely low EF   - Interrogation of ICD @Dr Wilson's office 5/20: back-to-back episodes of VT @ 200+ bpm all terminating with ATP. Since last cath pt has had 41 VT episodes (all falling into VF zone)  - Normal device function. BIV pacing 97%. No programming changes made  -. c/w Amiodarone 400 mg PO BID load, monitor LFTs   - BB held due to cardiogenic shock  - undergoing OHT/LVAD eval as above   - no further VT on inotropes     # CAD   - Chest pain free and compliant with DAPT since last PCI 4/18/22  - Cardiac cath @[facility] 4/18/22: mLAD 90% s/p BERNABE, LCx mild disease, RCA mild disease s/p diagnostic cardiac cath w/ Dr Wagner on 05/23/2022   - Cont ASA and Lipitor   - per discussion with interventional at OSH, ok to DC plavix at this time     GI  - No active issues  - DASH diet w/ 1500ml Fluid restriction  - Cont. PPI and bowel regimen, last BM 5/28  - Will need CT colo this week     Renal  # JAGRUTI on CKD II  Admitted w/ Cr 2.01 (baseline Cr ~1.3) now trending down   - Avoid nephrotoxic agents, NSAIDs. Renally dose meds.  - monitor strict IOs and continue current cardiac support     Heme/Onc  - No active issues  - HSQ for DVT ppx     Endo   # T2DM (A1C 6.2 on admission)  - Cont. ISS, glucose controlled, monitor FS closely now that pt is on steroids     MSK  # Gout flare, right knee   - continue renally dosed colchicine and allopurinol   - started on prednisone 40mg x5 days     ID  # Febrile yesterday to 100.6 F  - infectious workup sent including RVP (negative), UA(negative), and BCx x2  - vanco x1 given  - trend fever and wbc  - plan to dc swan/intro once bedside nipride study is done     Lines  COCO Buck (5/26 OSH)  HIREN Bonilla (OSH 5/26)  [place] 5/27

## 2022-05-29 NOTE — PROGRESS NOTE ADULT - ASSESSMENT
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 5/20 with near syncope in setting of worsening HF symptoms and found to have 41 episodes of VT, many terminating with ATP. LHC did not reveal new obstructive CAD and her underwent EPS which did not reveal endocardial substrate amenable for ablation. RHC revealed severely depressed cardiac output and he was transferred to [facility] 5/26 for advanced therapies evaluation.    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 He has improved significantly with higher doses of inotropes and increased pacing rate. He is INTERMACS 3 and SCAI stage B. He will likely need advanced therapies this admission and suspect he will need unloading with IABP/Impella. He has significant pulmonary hypertension but PVR is improving.       Review of studies  TTE 5/21: LV 5.2 cm, LVEF 10-15%, LVOT VTI 10 cm, moderate RV dysfunction, mild AI, minimal MR  EKG: a-BiV paced  [place] 5/23: patent mLAD stent with slow flow, D1 with 40-50% stenosis, mild disease otherwise  [facility] 5/26: RA 12, PA 70/40 (50), PCWP 22, Milana CO/CI 2.3/1.3, MAP 83 with SVR 2469, PVR 12 PERSAUD    Hemodynamics  5/27 (milrinone 0.25): RA 10, PA 76/35 (49), PCWP 34, PA sat 57% with Milana CO/CI 3.1/1.6, MAP 71 with SVR 1574, PVR 4.8  5/28 (on milrinone 0.375, nipride has been off since 3am): RA 7, PA 63/31, PCWP 26, PA sat 72.8% with Milana CO/CI 4.9/2.5, MAP 70 with SVR 1039, PVR 2.9  5/29 (on milrinone 0.5): RA 8, PA 75/32 (50), PCWP 24, PA sat 74.6% with Milana CO/CI 5.0/2.6, MAP 77 with SVR 1200, PVR    65 YO M with a history of ACC/AHA Stage C->D mixed NICM/ICM (likely familial with strong FH and early arrhythmia history in his 30's) with LVED 5.2 cm and LVEF 10-15% s/p PPM upgraded to CRT-D, CAD s/p PCI to mLAD 4/2022, well controlled DM2 (A1c 6.2%), and CKD III (Cr 1.4) who initially presented to [facility] 5/20 with near syncope in setting of worsening HF symptoms and found to have 41 episodes of VT, many terminating with ATP. LHC did not reveal new obstructive CAD and her underwent EPS which did not reveal endocardial substrate amenable for ablation. RHC revealed severely depressed cardiac output and he was transferred to [facility] 5/26 for advanced therapies evaluation.    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 He has improved significantly with higher doses of inotropes and increased pacing rate. He is INTERMACS 3 and SCAI stage B. He will likely need advanced therapies this admission and suspect he will need unloading with IABP/Impella. He has significant pulmonary hypertension but PVR is improving.       Review of studies  TTE 5/21: LV 5.2 cm, LVEF 10-15%, LVOT VTI 10 cm, moderate RV dysfunction, mild AI, minimal MR  EKG: a-BiV paced  [facility] 5/23: patent mLAD stent with slow flow, D1 with 40-50% stenosis, mild disease otherwise  [facility] 5/26: RA 12, PA 70/40 (50), PCWP 22, Milana CO/CI 2.3/1.3, MAP 83 with SVR 2469, PVR 12 PERSAUD    Hemodynamics  5/27 (milrinone 0.25): RA 10, PA 76/35 (49), PCWP 34, PA sat 57% with Milana CO/CI 3.1/1.6, MAP 71 with SVR 1574, PVR 4.8  5/28 (on milrinone 0.375, nipride has been off since 3am): RA 7, PA 63/31, PCWP 26, PA sat 72.8% with Milana CO/CI 4.9/2.5, MAP 70 with SVR 1039, PVR 2.9  5/29 (on milrinone 0.5): RA 8, PA 75/32 (50), PCWP 24, PA sat 74.6% with Milana CO/CI 5.0/2.6, MAP 77 with SVR 1200, PVR 5.2   67 YO M with a history of ACC/AHA Stage C->D mixed NICM/ICM (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 LHC did not reveal new obstructive CAD and her underwent EPS which did not reveal endocardial substrate amenable for ablation. RHC revealed severely depressed cardiac output and he was transferred to [facility] 5/26 for advanced therapies evaluation.    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 He has improved significantly with sequential uptitration of inotropes and increased pacing rate. He is INTERMACS 3 and SCAI stage B. He will likely need advanced therapies this admission. He has significant pulmonary hypertension but PVR is reversible with nipride and suspect would improve with LV unloading given severely elevated PCWP despite euvolemia and low diastolic pulmonary gradient.        Review of studies  TTE 5/21: LV 5.2 cm, LVEF 10-15%, LVOT VTI 10 cm, moderate RV dysfunction, mild AI, minimal MR  EKG: a-BiV paced  [facility] 5/23: patent mLAD stent with slow flow, D1 with 40-50% stenosis, mild disease otherwise  [facility] 5/26: RA 12, PA 70/40 (50), PCWP 22, Milana CO/CI 2.3/1.3, MAP 83 with SVR 2469, PVR 12 PERSAUD    Hemodynamics  5/27 (milrinone 0.25): RA 10, PA 76/35 (49), PCWP 34, PA sat 57% with Milana CO/CI 3.1/1.6, MAP 71 with SVR 1574, PVR 4.8  5/28 (on milrinone 0.375): RA 7, PA 63/31, PCWP 26, PA sat 72.8% with Milana CO/CI 4.9/2.5 (CI later dropped to 1.6 in the afternoon), MAP 70 with SVR 1039, PVR 2.9  5/29 (on milrinone 0.5): RA 8, PA 75/32 (50), PCWP 24, PA sat 74% with Milana CO/CI 5.0/2.6, MAP 77 with SVR 1200, PVR 5.2  5/29 (on milrinone 0.5 and nipride to 3 mcg/kg/min): RA 6, PA 59/31 (40), PCWP 30, PA sat 79% with Milana CO/CI 7.0/3.6, BP 97/57 (MAP 70) with , PVR 1.4

## 2022-05-29 NOTE — PROGRESS NOTE ADULT - SUBJECTIVE AND OBJECTIVE BOX
GOCOOL, LENNOX  MRN-[id number]  Patient is a 66y old  Male who presents with a chief complaint of LVAD/OHT eval (29 May 2022 10:10)    HPI:  66M Mixed Ischemic/NICM Cardiomyopathy (EF 25-30% at baseline, s/p BIV-ICD), CAD (medically managed MIs in ,, Most recent stent in 2022 to mLAD) presented with multiple near syncope, found to have 41 episodes of VT and admitted initially to cardiac telemetry for further evaluation/management. Ischemic evaluation without new disease and VT ablation without substrate to complete ablation.   Transferred from [facility] for further management and evaluation for LVAD vs OHT.    (26 May 2022 20:31)      Hospital Course:   Transferred to CCU for further management     24 HOUR EVENTS:    REVIEW OF SYSTEMS:  CONSTITUTIONAL: No weakness, fevers or chills  EYES/ENT: No visual changes;  No vertigo or throat pain   NECK: No pain or stiffness  RESPIRATORY: No cough, wheezing, hemoptysis; No shortness of breath  CARDIOVASCULAR: No chest pain or palpitations  GASTROINTESTINAL: No abdominal or epigastric pain. No nausea, vomiting, or hematemesis; No diarrhea or constipation. No melena or hematochezia.  GENITOURINARY: No dysuria, frequency or hematuria  NEUROLOGICAL: No numbness or weakness  SKIN: No itching, rashes      ICU Vital Signs Last 24 Hrs  T(C): 36.7 (29 May 2022 15:00), Max: 36.9 (29 May 2022 00:00)  T(F): 98.1 (29 May 2022 15:00), Max: 98.4 (29 May 2022 00:00)  HR: 80 (29 May 2022 18:00) (80 - 80)  BP: --  BP(mean): --  ABP: 114/67 (29 May 2022 18:00) (91/57 - 135/87)  ABP(mean): 82 (29 May 2022 18:00) (68 - 105)  RR: 26 (29 May 2022 18:00) (16 - 31)  SpO2: 94% (29 May 2022 18:00) (93% - 98%)      CVP(mm Hg): 1 (22 @ 10:00) (-3 - 240)  CO: 6 (22 @ 06:00) (5 - 6)  CI: 3.1 (22 @ 06:00) (2.6 - 3.1)  PA: 83/43 (22 @ 11:00) (55/23 - 90/28)  PA(mean): 59 (22 @ 11:00) (35 - 59)  PA(direct): --  PCWP: 26 (22 @ 08:15) (26 - 26)  LA: --  RA: --  SVR: 1072 (22 @ 04:00) (1072 - 1072)  SVRI: --  PVR: --  PVRI: --  I&O's Summary    28 May 2022 07:  -  29 May 2022 07:00  --------------------------------------------------------  IN: 1470.4 mL / OUT: 1275 mL / NET: 195.4 mL    29 May 2022 07:01  -  29 May 2022 19:33  --------------------------------------------------------  IN: 732 mL / OUT: 1000 mL / NET: -268 mL        CAPILLARY BLOOD GLUCOSE    CAPILLARY BLOOD GLUCOSE      POCT Blood Glucose.: 132 mg/dL (29 May 2022 17:16)      PHYSICAL EXAM:  Constitutional: NAD, well-groomed, well-developed  HEENT: PERRLA, EOMI, no drainage or redness  Neck: supple,  No JVD  Respiratory: Breath Sounds equal & clear bilaterally to auscultation, no rales/rhonchi/wheezing, no accessory muscle use noted  Cardiovascular: Regular rate, regular rhythm, normal S1, S2; no murmurs or rub  Gastrointestinal: Soft, non-tender, non distended, + bowel sounds  Extremities: BRADFORD x 4, no peripheral edema, no cyanosis, no clubbing   Neurological: A+O x 3; speech clear and intact; no sensory, motor  deficits, normal reflexes  Skin: warm, dry, well perfused      ============================I/O===========================   I&O's Detail    28 May 2022 07:  -  29 May 2022 07:00  --------------------------------------------------------  IN:    IV PiggyBack: 712.5 mL    Milrinone: 110 mL    Milrinone: 107.9 mL    Oral Fluid: 540 mL  Total IN: 1470.4 mL    OUT:    Voided (mL): 1275 mL  Total OUT: 1275 mL    Total NET: 195.4 mL      29 May 2022 07:01  -  29 May 2022 19:33  --------------------------------------------------------  IN:    Milrinone: 132 mL    Oral Fluid: 600 mL  Total IN: 732 mL    OUT:    Voided (mL): 1000 mL  Total OUT: 1000 mL    Total NET: -268 mL        ============================ LABS =========================                        13.5   11.90 )-----------( 183      ( 29 May 2022 05:07 )             40.2     -    131<L>  |  100  |  33<H>  ----------------------------<  154<H>  4.2   |  22  |  1.43<H>    Ca    8.7      29 May 2022 05:06  Phos  3.7       Mg     2.4         TPro  6.4  /  Alb  3.2<L>  /  TBili  1.1  /  DBili  x   /  AST  19  /  ALT  15  /  AlkPhos  56      Troponin T, High Sensitivity Result: 93 ng/L (22 @ 20:22)    CKMB Units: 2.2 ng/mL (22 @ 20:22)    Creatine Kinase, Serum: 210 U/L (22 @ 20:22)    CPK Mass Assay %: 1.0 % (22 @ 20:22)        LIVER FUNCTIONS - ( 29 May 2022 05:06 )  Alb: 3.2 g/dL / Pro: 6.4 g/dL / ALK PHOS: 56 U/L / ALT: 15 U/L / AST: 19 U/L / GGT: x             ABG - ( 29 May 2022 05:00 )  pH, Arterial: 7.43  pH, Blood: x     /  pCO2: 33    /  pO2: 120   / HCO3: 22    / Base Excess: -1.7  /  SaO2: 98.9              Blood Gas Arterial, Lactate: 0.9 mmol/L (22 @ 05:00)  Blood Gas Arterial, Lactate: 1.8 mmol/L (22 @ 22:18)  Blood Gas Arterial, Lactate: 1.6 mmol/L (22 @ 19:30)  Blood Gas Arterial, Lactate: 1.2 mmol/L (22 @ 04:15)  Blood Gas Arterial, Lactate: 1.3 mmol/L (22 @ 14:35)  Blood Gas Arterial, Lactate: 1.0 mmol/L (22 @ 05:00)  Blood Gas Venous - Lactate: 1.0 mmol/L (22 @ 05:00)  Blood Gas Venous - Lactate: 0.9 mmol/L (22 @ 00:15)  Blood Gas Arterial, Lactate: 1.3 mmol/L (22 @ 22:05)  Blood Gas Venous - Lactate: 1.2 mmol/L (22 @ 22:05)  Blood Gas Venous - Lactate: 1.7 mmol/L (22 @ 20:40)  Blood Gas Venous - Lactate: 1.7 mmol/L (22 @ 20:20)  Blood Gas Arterial, Lactate: 1.6 mmol/L (22 @ 20:15)    Urinalysis Basic - ( 28 May 2022 18:58 )    Color: Yellow / Appearance: Clear / S.025 / pH: x  Gluc: x / Ketone: Negative  / Bili: Negative / Urobili: 2 mg/dL   Blood: x / Protein: 30 mg/dL / Nitrite: Negative   Leuk Esterase: Negative / RBC: 16 /hpf / WBC 2 /HPF   Sq Epi: x / Non Sq Epi: 0 /hpf / Bacteria: 0.0      ======================Micro/Rad/Cardio=================  Telemtry: Reviewed   EKG: Reviewed  CXR: Reviewed  Culture: Reviewed   Echo:   Cath:   ======================================================  PAST MEDICAL & SURGICAL HISTORY:  AV block      Essential hypertension      Chronic HFrEF (heart failure with reduced ejection fraction)      Chronic kidney disease, unspecified CKD stage      CAD (coronary artery disease)      HLD (hyperlipidemia)      Type 2 diabetes mellitus      Artificial cardiac pacemaker        ====================ASSESSMENT ==============  67 y/o male w/ PMHx HTN, HLD, type 2 DM, chronic HFrEF (EF 25-30% by Echo), complete heart block s/p PPM (in , upgraded to BiV-ICD in 2016), CAD (s/p recent BERNABE mid LAD at [facility] on 2022), and stage II CKD (baseline Cr ~1.3) presented with near syncope to OSH found to have 41 episodes of VT on device, s/p unsuccessful VT ablation and transferred to [facility] for management of cardiogenic shock and advanced therapy eval.         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  - SPO2 94-95% on room air       ====================CARDIOVASCULAR==================  Cardiogenic shock   - TTE : LV 5.2 cm, LVEF 10-15%, LVOT VTI 10 cm, moderate RV dysfunction, mild AI, minimal MR  - C : patent mLAD stent with slow flow, D1 with 40-50% stenosis  - RHC : RA 12, PA 70/40 (50), PCWP 22, Milana CO/CI 2.3/1.3, MAP 83 with SVR 2469, PVR 12 PERSAUD  - : RA 10, PA 76/35 (49), PCWP 34, PA sat 57% with Milana CO/CI 3.1/1.6, MAP 71 with SVR 1574, PVR 4.8  - Maintain Milrinone .5mcg/kg/min   - monitor hemodynamics and perfusion indices with PA catheter in place  - will need to remove swan/cordis given fever; will do a bedside nipride study with HF attending prior to removal and calculate PVR    - goal CVP 8-10, has been single digits, holding diuresis  - monitor strict IOs and daily wts   - Undergoing VAD/OHT workup, Will need a CT colo monday or tuesday once patient remains stable on current support.   - low threshold for escalation to IABP if hemodynamics worsen   - HSQ for DVT prophylaxis   VTach   - s/p EPS on , unable to perform ablation as no substrate found and did not induce VT because of severely low EF   - Interrogation of ICD @Dr Wilson's office : back-to-back episodes of VT @ 200+ bpm all terminating with ATP. Since last cath pt has had 41 VT episodes (all falling into VF zone)  - Normal device function. BIV pacing 97%. No programming changes made  -. c/w Amiodarone 400 mg PO BID load, monitor LFTs   - BB held due to cardiogenic shock  - undergoing OHT/LVAD eval as above   - no further VT on inotropes     CAD   - Chest pain free and compliant with DAPT since last PCI 22  - Cardiac cath @[facility] 22: mLAD 90% s/p BERNABE, LCx mild disease, RCA mild disease s/p diagnostic cardiac cath w/ Dr Wagner on 2022   - Cont ASA and Lipitor   - per discussion with interventional at OSH, ok to DC plavix at this time     aspirin enteric coated 81 milliGRAM(s) Oral daily  aMIOdarone    Tablet 400 milliGRAM(s) Oral two times a day  atorvastatin 80 milliGRAM(s) Oral at bedtime  hydrALAZINE 10 milliGRAM(s) Oral every 8 hours  milrinone Infusion 0.5 MICROgram(s)/kG/Min (11 mL/Hr) IV Continuous <Continuous>    ===================HEMATOLOGIC/ONC ===================  - No active issues  - HSQ for DVT ppx     heparin   Injectable 5000 Unit(s) SubCutaneous every 8 hours    ===================== RENAL =========================  JAGRUTI on CKD II  Admitted w/ Cr 2.01 (baseline Cr ~1.3) now trending down   - Avoid nephrotoxic agents, NSAIDs. Renally dose meds.  - monitor strict IOs and continue current cardiac support       ==================== GASTROINTESTINAL===================  - No active issues  - Tolerating PO consistent carb diet.   - Cont. PPI and bowel regimen w/ Miralax and Senna, last BM   - Will need CT colo this week     pantoprazole    Tablet 40 milliGRAM(s) Oral before breakfast  polyethylene glycol 3350 17 Gram(s) Oral two times a day  senna 2 Tablet(s) Oral at bedtime    =======================    ENDOCRINE  =====================  T2DM (A1C 6.2 on admission)  - Cont. ISS, glucose controlled, monitor FS closely now that pt is on steroids     Gout flare, right knee   - continue renally dosed colchicine and allopurinol   - started on prednisone 40mg x5 days     allopurinol 75 milliGRAM(s) Oral daily  colchicine 0.6 milliGRAM(s) Oral daily  insulin lispro (ADMELOG) corrective regimen sliding scale   SubCutaneous three times a day before meals  insulin lispro (ADMELOG) corrective regimen sliding scale   SubCutaneous at bedtime  predniSONE   Tablet 40 milliGRAM(s) Oral daily    ========================INFECTIOUS DISEASE================  Febrile yesterday to 100.6 F  - infectious workup sent including RVP (negative), UA(negative), and BCx x2  - vanco x1 given  - trend fever and wbc  - plan to dc swan/intro once bedside nipride study is done       Patient requires continuous monitoring with bedside rhythm monitoring, pulse ox monitoring, and intermittent blood gas analysis. Care plan discussed with ICU care team. Patient remained critical and at risk for life threatening decompensation.  Patient seen, examined and plan discussed with CCU team during rounds.     I have personally provided ____ minutes of critical care time excluding time spent on separate procedures, in addition to initial critical care time provided by the CICU Attending, Dr. Leonardo.    By signing my name below, I, Camila Martinez, attest that this documentation has been prepared under the direction and in the presence of RAMSES Longo   Electronically signed: Mini Lassiter, 22 @ 19:33    I, RAMSES Longo, personally performed the services described in this documentation. all medical record entries made by the nelidaibe were at my direction and in my presence. I have reviewed the chart and agree that the record reflects my personal performance and is accurate and complete  Electronically signed: RAMSES Longo        GOCOOL, LENNOX  MRN-[id number]  Patient is a 66y old  Male who presents with a chief complaint of LVAD/OHT eval (29 May 2022 10:10)    HPI:  66M Mixed Ischemic/NICM Cardiomyopathy (EF 25-30% at baseline, s/p BIV-ICD), CAD (medically managed MIs in ,, Most recent stent in 2022 to mLAD) presented with multiple near syncope, found to have 41 episodes of VT and admitted initially to cardiac telemetry for further evaluation/management. Ischemic evaluation without new disease and VT ablation without substrate to complete ablation.   Transferred from [facility] for further management and evaluation for LVAD vs OHT.    (26 May 2022 20:31)    24 HOUR EVENTS:  [place] removed  Bedside nipride challenge with HF- responsive with decreased PVR     REVIEW OF SYSTEMS:  CONSTITUTIONAL: No weakness, fevers or chills  EYES/ENT: No visual changes;  No vertigo or throat pain   NECK: No pain or stiffness  RESPIRATORY: No cough, wheezing, hemoptysis; No shortness of breath  CARDIOVASCULAR: No chest pain or palpitations  GASTROINTESTINAL: No abdominal or epigastric pain. No nausea, vomiting, or hematemesis; No diarrhea or constipation. No melena or hematochezia.  GENITOURINARY: No dysuria, frequency or hematuria  NEUROLOGICAL: No numbness or weakness  SKIN: No itching, rashes      ICU Vital Signs Last 24 Hrs  T(C): 36.7 (29 May 2022 15:00), Max: 36.9 (29 May 2022 00:00)  T(F): 98.1 (29 May 2022 15:00), Max: 98.4 (29 May 2022 00:00)  HR: 80 (29 May 2022 18:00) (80 - 80)  ABP: 114/67 (29 May 2022 18:00) (91/57 - 135/87)  ABP(mean): 82 (29 May 2022 18:00) (68 - 105)  RR: 26 (29 May 2022 18:00) (16 - 31)  SpO2: 94% (29 May 2022 18:00) (93% - 98%)      CVP(mm Hg): 1 (22 @ 10:00) (-3 - 240)  CO: 6 (22 @ 06:00) (5 - 6)  CI: 3.1 (22 @ 06:00) (2.6 - 3.1)  PA: 83/43 (22 @ 11:00) (55/23 - 90/28)  PA(mean): 59 (22 @ 11:00) (35 - 59)  PA(direct): --  PCWP: 26 (22 @ 08:15) ( - 26)  SVR: 1072 (22 @ 04:00) (1072 - 1072)    I&O's Summary    28 May 2022 07:  -  29 May 2022 07:00  --------------------------------------------------------  IN: 1470.4 mL / OUT: 1275 mL / NET: 195.4 mL    29 May 2022 07:01  -  29 May 2022 19:33  --------------------------------------------------------  IN: 732 mL / OUT: 1000 mL / NET: -268 mL        CAPILLARY BLOOD GLUCOSE    CAPILLARY BLOOD GLUCOSE      POCT Blood Glucose.: 132 mg/dL (29 May 2022 17:16)      PHYSICAL EXAM:  Constitutional: NAD, well-groomed, well-developed  HEENT: PERRLA, EOMI, no drainage or redness  Neck: supple,  No JVD  Respiratory: Breath Sounds equal & clear bilaterally to auscultation, no rales/rhonchi/wheezing, no accessory muscle use noted  Cardiovascular: Regular rate, regular rhythm, normal S1, S2; no murmurs or rub  Gastrointestinal: Soft, non-tender, non distended, + bowel sounds  Extremities: BRADFORD x 4, no peripheral edema, no cyanosis, no clubbing   Neurological: A+O x 3; speech clear and intact; no sensory, motor  deficits, normal reflexes  Skin: warm, dry, well perfused      ============================I/O===========================   I&O's Detail    28 May 2022 07:01  -  29 May 2022 07:00  --------------------------------------------------------  IN:    IV PiggyBack: 712.5 mL    Milrinone: 110 mL    Milrinone: 107.9 mL    Oral Fluid: 540 mL  Total IN: 1470.4 mL    OUT:    Voided (mL): 1275 mL  Total OUT: 1275 mL    Total NET: 195.4 mL      29 May 2022 07:01  -  29 May 2022 19:33  --------------------------------------------------------  IN:    Milrinone: 132 mL    Oral Fluid: 600 mL  Total IN: 732 mL    OUT:    Voided (mL): 1000 mL  Total OUT: 1000 mL    Total NET: -268 mL        ============================ LABS =========================                        13.5   11.90 )-----------( 183      ( 29 May 2022 05:07 )             40.2         131<L>  |  100  |  33<H>  ----------------------------<  154<H>  4.2   |  22  |  1.43<H>    Ca    8.7      29 May 2022 05:06  Phos  3.7       Mg     2.4         TPro  6.4  /  Alb  3.2<L>  /  TBili  1.1  /  DBili  x   /  AST  19  /  ALT  15  /  AlkPhos  56      Troponin T, High Sensitivity Result: 93 ng/L (22 @ 20:22)    CKMB Units: 2.2 ng/mL (22 @ 20:22)    Creatine Kinase, Serum: 210 U/L (22 @ 20:22)    CPK Mass Assay %: 1.0 % (22 @ 20:22)        LIVER FUNCTIONS - ( 29 May 2022 05:06 )  Alb: 3.2 g/dL / Pro: 6.4 g/dL / ALK PHOS: 56 U/L / ALT: 15 U/L / AST: 19 U/L / GGT: x             ABG - ( 29 May 2022 05:00 )  pH, Arterial: 7.43  pH, Blood: x     /  pCO2: 33    /  pO2: 120   / HCO3: 22    / Base Excess: -1.7  /  SaO2: 98.9              Blood Gas Arterial, Lactate: 0.9 mmol/L (22 @ 05:00)  Blood Gas Arterial, Lactate: 1.8 mmol/L (22 @ 22:18)  Blood Gas Arterial, Lactate: 1.6 mmol/L (22 @ 19:30)  Blood Gas Arterial, Lactate: 1.2 mmol/L (22 @ 04:15)  Blood Gas Arterial, Lactate: 1.3 mmol/L (22 @ 14:35)  Blood Gas Arterial, Lactate: 1.0 mmol/L (22 @ 05:00)  Blood Gas Venous - Lactate: 1.0 mmol/L (22 @ 05:00)  Blood Gas Venous - Lactate: 0.9 mmol/L (22 @ 00:15)  Blood Gas Arterial, Lactate: 1.3 mmol/L (22 @ 22:05)  Blood Gas Venous - Lactate: 1.2 mmol/L (22 @ 22:05)  Blood Gas Venous - Lactate: 1.7 mmol/L (22 @ 20:40)  Blood Gas Venous - Lactate: 1.7 mmol/L (22 @ 20:20)  Blood Gas Arterial, Lactate: 1.6 mmol/L (22 @ 20:15)    Urinalysis Basic - ( 28 May 2022 18:58 )    Color: Yellow / Appearance: Clear / S.025 / pH: x  Gluc: x / Ketone: Negative  / Bili: Negative / Urobili: 2 mg/dL   Blood: x / Protein: 30 mg/dL / Nitrite: Negative   Leuk Esterase: Negative / RBC: 16 /hpf / WBC 2 /HPF   Sq Epi: x / Non Sq Epi: 0 /hpf / Bacteria: 0.0      ======================Micro/Rad/Cardio=================  Telemtry: Reviewed   EKG: Reviewed  CXR: Reviewed  Culture: Reviewed   Echo:   Cath:   ======================================================  PAST MEDICAL & SURGICAL HISTORY:  AV block      Essential hypertension      Chronic HFrEF (heart failure with reduced ejection fraction)      Chronic kidney disease, unspecified CKD stage      CAD (coronary artery disease)      HLD (hyperlipidemia)      Type 2 diabetes mellitus      Artificial cardiac pacemaker        ====================ASSESSMENT ==============  65 y/o male w/ PMHx HTN, HLD, type 2 DM, chronic HFrEF (EF 25-30% by Echo), complete heart block s/p PPM (in , upgraded to BiV-ICD in 2016), CAD (s/p recent BERNABE mid LAD at [facility] on 2022), and stage II CKD (baseline Cr ~1.3) presented with near syncope to OSH found to have 41 episodes of VT on device, s/p unsuccessful VT ablation and transferred to [facility] for management of cardiogenic shock and advanced therapy eval.         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 Nipride study  with reduction in PVR to < 2 though still with elevated PA pressures   - SPO2 94-95% on room air, continue to monitor Spo2 with goal >94%    ====================CARDIOVASCULAR==================  Cardiogenic shock   - TTE : LV 5.2 cm, LVEF 10-15%, LVOT VTI 10 cm, moderate RV dysfunction, mild AI, minimal MR  - LHC : patent mLAD stent with slow flow, D1 with 40-50% stenosis  - RHC : RA 12, PA 70/40 (50), PCWP 22, Milana CO/CI 2.3/1.3, MAP 83 with SVR 2469, PVR 12 PERSAUD  - : RA 10, PA 76/35 (49), PCWP 34, PA sat 57% with Milana CO/CI 3.1/1.6, MAP 71 with SVR 1574, PVR 4.8  - Maintain Milrinone .5mcg/kg/min. monitor perfusion indices and lactate since swan removed   - will need to remove swan/cordis given fever  - Nipride study  with reduction in PVR to < 2 though still with elevated PA pressures   - CVP single digits prior to removal of cordis, holding diuresis  - monitor strict IOs and daily wts   - Undergoing VAD/OHT workup, Will need a CT colo tuesday and US elastography   - low threshold for escalation to IABP if hemodynamics worsen   - HSQ for DVT prophylaxis     VTach   - s/p EPS on , unable to perform ablation as no substrate found and did not induce VT   - Interrogation of ICD @Dr Wilson's office : back-to-back episodes of VT @ 200+ bpm all terminating with ATP. Since last cath pt has had 41 VT episodes (all falling into VF zone)  - Normal device function. BIV pacing 97%. No programming changes made  -. c/w Amiodarone 400 mg PO BID load, monitor LFTs   - BB held due to cardiogenic shock  - undergoing OHT/LVAD eval as above   -  continue to monitor on tele     CAD   - Chest pain free and compliant with DAPT since last PCI 22  - Cardiac cath @[facility] 22: mLAD 90% s/p BERNABE, LCx mild disease, RCA mild disease s/p diagnostic cardiac cath w/ Dr Wagner on 2022   - Cont ASA and Lipitor   - per discussion with interventional at OSH, ok to DC plavix at this time     aspirin enteric coated 81 milliGRAM(s) Oral daily  aMIOdarone    Tablet 400 milliGRAM(s) Oral two times a day  atorvastatin 80 milliGRAM(s) Oral at bedtime  hydrALAZINE 10 milliGRAM(s) Oral every 8 hours  milrinone Infusion 0.5 MICROgram(s)/kG/Min (11 mL/Hr) IV Continuous <Continuous>    ===================HEMATOLOGIC/ONC ===================  H/H and plts stable  - continue to monitor CBC  - HSQ for DVT ppx     heparin   Injectable 5000 Unit(s) SubCutaneous every 8 hours    ===================== RENAL =========================  JAGRUTI on CKD II  - Admitted w/ Cr 2.01 (baseline Cr ~1.3) now trending down and approaching baseline.   - Avoid nephrotoxic agents, NSAIDs. Renally dose meds.  - monitor strict IOs and continue current cardiac support       ==================== GASTROINTESTINAL===================  - Tolerating PO consistent carb diet. Will have CLD tomorrow for CT colo Tuesday   - Cont. PPI and bowel regimen w/ Miralax and Senna, last BM   - Will need CT colo this week     pantoprazole    Tablet 40 milliGRAM(s) Oral before breakfast  polyethylene glycol 3350 17 Gram(s) Oral two times a day  senna 2 Tablet(s) Oral at bedtime    =======================    ENDOCRINE  =====================  T2DM (A1C 6.2 on admission)  - Cont. ISS, glucose controlled, monitor FS closely now that pt is on steroids     Gout flare, right knee   - continue renally dosed colchicine and allopurinol   - started on prednisone 40mg x5 days     allopurinol 75 milliGRAM(s) Oral daily  colchicine 0.6 milliGRAM(s) Oral daily  insulin lispro (ADMELOG) corrective regimen sliding scale   SubCutaneous three times a day before meals  insulin lispro (ADMELOG) corrective regimen sliding scale   SubCutaneous at bedtime  predniSONE   Tablet 40 milliGRAM(s) Oral daily    ========================INFECTIOUS DISEASE================  Febrile yesterday to 100.6 F  - infectious workup sent including RVP (negative), UA(negative), and BCx x2  - vanco x1 given. will monitor off abx for now but low threshold for initiation of empiric coverage   - trend fever and wbc  - plan to dc swan/intro once bedside nipride study is done       Patient requires continuous monitoring with bedside rhythm monitoring, pulse ox monitoring, and intermittent blood gas analysis. Care plan discussed with ICU care team. Patient remained critical and at risk for life threatening decompensation.  Patient seen, examined and plan discussed with CCU team during rounds.     I have personally provided >35 minutes of critical care time excluding time spent on separate procedures, in addition to initial critical care time provided by the CICU Attending, Dr. Leonardo.    By signing my name below, I, Camila Martinez, attest that this documentation has been prepared under the direction and in the presence of RAMSES Longo   Electronically signed: Mini Lassiter, 22 @ 19:33    I, RAMSES Longo, personally performed the services described in this documentation. all medical record entries made by the nelidaibe were at my direction and in my presence. I have reviewed the chart and agree that the record reflects my personal performance and is accurate and complete  Electronically signed: RAMSES [place]

## 2022-05-29 NOTE — PROGRESS NOTE ADULT - PROBLEM SELECTOR PLAN 1
- C/w milrinone to 0.375 mcg/kg/min  - CI/CO improved after AV pacing rate increased to 80  - Nipride weaned off at 3am, continue to keep MAP ~70 with hydralazine prn  - Continue PAC monitoring (placed in cath lab 5/27)   - Pending advanced therapies eval for LVAD/transplant, will need colonoscopy when better compensated. Of note he had within a 24 hour span on 5/28 a SBP < 90, PCWP > 20, and CI of 1.7 on inotropes - C/w milrinone to 0.5 mcg/kg/min  - CI/CO improved after AV pacing rate increased to 80  - Continue to keep MAP ~70 with hydralazine 10mg TID   - Okay to discontinue PA catheter especially with fever overnight  - Pending advanced therapies eval for LVAD/transplant, will need colonoscopy when better compensated. Of note he had within a 24 hour span on 5/28 a SBP < 90, PCWP > 20, and CI of 1.7 on inotropes  - Also order liver elastography as part of advanced therapies eval - C/w milrinone to 0.5 mcg/kg/min  - CI/CO improved after AV pacing rate increased to 80  - Continue to keep MAP ~70 with hydralazine 10mg TID   - Discontinue PAC/cordis given fever   - Pending advanced therapies eval for LVAD/transplant, needs colonoscopy and liver elastography. Of note he had within a 24 hour span on 5/28 a SBP < 90, PCWP > 20, and CI of 1.7 on inotropes

## 2022-05-29 NOTE — PROGRESS NOTE ADULT - PROBLEM SELECTOR PLAN 5
"   LOS: 5 days    Patient Care Team:  Goldy Dior MD as PCP - General (Internal Medicine)    Chief Complaint: Shortness of breath COVID-19 positive  Consulted for ESRD.  71-year-old female with ESRD on hemodialysis Monday Wednesday Friday, CAD, diabetes, history of DVT on Coumadin, history of CVA cerebellar, morbid obesity, hemorrhoids with anal fissures presented with more places and headache.  She has missed her dialysis earlier.  She was recently diagnosed with COVID-19   Subjective   No new events.    Laying comfortable in bed no obvious distress.       Review of Systems:   Patient seen from the outside the last.  No new complaints    Objective     Vital Sign Min/Max for last 24 hours  Temp  Min: 96.2 °F (35.7 °C)  Max: 98.1 °F (36.7 °C)   BP  Min: 116/55  Max: 121/48   Pulse  Min: 69  Max: 82   Resp  Min: 16  Max: 20   SpO2  Min: 80 %  Max: 100 %   No data recorded   No data recorded     Flowsheet Rows      First Filed Value   Admission Height  162.6 cm (64\") Documented at 06/29/2020 0341   Admission Weight  (!) 147 kg (324 lb) Documented at 06/29/2020 0341          No intake/output data recorded.  I/O last 3 completed shifts:  In: 360 [P.O.:360]  Out: 3160 [Urine:200; Other:2960]    Physical Exam:  Due to COVID-19: Patient's examinations received from the staff RN and nursing assistant.   asymptomatic.  General Appearance: , no obvious distress.   Neck: Supple     .  Lungs:Equal chest movement, nonlabored.  Heart: Regular  Abdomen: Obesity  Extremities: Positive edema according to the RN  Neuro: Moving all extremities.       WBC WBC   Date Value Ref Range Status   07/02/2020 6.46 3.40 - 10.80 10*3/mm3 Final      HGB Hemoglobin   Date Value Ref Range Status   07/02/2020 7.9 (L) 12.0 - 15.9 g/dL Final   07/01/2020 7.7 (L) 12.0 - 15.9 g/dL Final      HCT Hematocrit   Date Value Ref Range Status   07/02/2020 29.6 (L) 34.0 - 46.6 % Final   07/01/2020 26.6 (L) 34.0 - 46.6 % Final      Platlets No results " found for: LABPLAT   MCV MCV   Date Value Ref Range Status   07/02/2020 110.0 (H) 79.0 - 97.0 fL Final          Sodium Sodium   Date Value Ref Range Status   07/03/2020 136 136 - 145 mmol/L Final   07/02/2020 144 136 - 145 mmol/L Final   07/01/2020 140 136 - 145 mmol/L Final      Potassium Potassium   Date Value Ref Range Status   07/03/2020 3.5 3.5 - 5.2 mmol/L Final   07/02/2020 4.1 3.5 - 5.2 mmol/L Final     Comment:     Specimen hemolyzed.  Results may be affected.   07/01/2020 3.7 3.5 - 5.2 mmol/L Final      Chloride Chloride   Date Value Ref Range Status   07/03/2020 96 (L) 98 - 107 mmol/L Final   07/02/2020 103 98 - 107 mmol/L Final   07/01/2020 101 98 - 107 mmol/L Final      CO2 CO2   Date Value Ref Range Status   07/03/2020 26.0 22.0 - 29.0 mmol/L Final   07/02/2020 29.0 22.0 - 29.0 mmol/L Final   07/01/2020 29.0 22.0 - 29.0 mmol/L Final      BUN BUN   Date Value Ref Range Status   07/03/2020 23 8 - 23 mg/dL Final   07/02/2020 33 (H) 8 - 23 mg/dL Final   07/01/2020 25 (H) 8 - 23 mg/dL Final      Creatinine Creatinine   Date Value Ref Range Status   07/03/2020 3.06 (H) 0.57 - 1.00 mg/dL Final   07/02/2020 4.34 (H) 0.57 - 1.00 mg/dL Final   07/01/2020 2.85 (H) 0.57 - 1.00 mg/dL Final      Calcium Calcium   Date Value Ref Range Status   07/03/2020 8.6 8.6 - 10.5 mg/dL Final   07/02/2020 9.0 8.6 - 10.5 mg/dL Final   07/01/2020 8.7 8.6 - 10.5 mg/dL Final      PO4 No results found for: CAPO4   Albumin No results found for: ALBUMIN   Magnesium No results found for: MG   Uric Acid No results found for: URICACID        Results Review:     I reviewed the patient's new clinical results.      aspirin 81 mg Oral Daily   atorvastatin 80 mg Oral Nightly   budesonide-formoterol 2 puff Inhalation BID - RT   calcitriol 0.25 mcg Oral Daily   carvedilol 12.5 mg Oral BID With Meals   docusate sodium 100 mg Oral BID   Hydrocortisone (Perianal)  Rectal BID   insulin lispro 0-7 Units Subcutaneous TID AC   isosorbide mononitrate 30  mg Oral Daily   nystatin  Topical Q12H   pantoprazole 40 mg Oral Q AM   saccharomyces boulardii 250 mg Oral Daily   sertraline 50 mg Oral Daily   sevelamer 800 mg Oral TID With Meals   sodium chloride 10 mL Intravenous Q12H   warfarin 5 mg Oral Daily       Pharmacy Consult        Medication Review: As above    Assessment/Plan   1.  ESRD dialysis Monday Wednesday Friday.  2.  Blood pressure: Monitoring closely with current medications.  3.  BMD: On phosphate binders  4.  Hypoxia: Monitor closely.  5.  Anemia of chronic kidney disease.  6.  Acute respiratory failure with hypoxia COVID-19 positive.  7.  Epistaxis with supratherapeutic INR.  Plan:   carvedilol 12.5 mg twice daily, monitor.  HD per MWF vale.  Dialysis completed.  Transfuse at hb<7. Avoid KIRK in the setting of COVID-19 infection.   High risk patient with multiple medical problems    Irineo Latham MD  07/04/20  11:24     - hx of VT s/p unsuccessful VT ablation  - remains on amio 400 mg PO BID  - since being admitted to [facility] has not had any episodes of VT, currently tolerating milrinone.

## 2022-05-29 NOTE — PROGRESS NOTE ADULT - SUBJECTIVE AND OBJECTIVE BOX
HEART FAILURE FELLOW PROGRESS NOTE    Subjective:    No acute events overnight.   ROS + R knee pain and swelling - slightly improved    Current Medications:   allopurinol 75 milliGRAM(s) Oral daily  aMIOdarone    Tablet 400 milliGRAM(s) Oral two times a day  aspirin enteric coated 81 milliGRAM(s) Oral daily  atorvastatin 80 milliGRAM(s) Oral at bedtime  chlorhexidine 4% Liquid 1 Application(s) Topical <User Schedule>  dextrose 5%. 1000 milliLiter(s) IV Continuous <Continuous>  dextrose 5%. 1000 milliLiter(s) IV Continuous <Continuous>  dextrose 50% Injectable 25 Gram(s) IV Push once  dextrose 50% Injectable 12.5 Gram(s) IV Push once  dextrose 50% Injectable 25 Gram(s) IV Push once  dextrose Oral Gel 15 Gram(s) Oral once PRN  glucagon  Injectable 1 milliGRAM(s) IntraMuscular once  heparin   Injectable 5000 Unit(s) SubCutaneous every 8 hours  influenza   Vaccine 0.5 milliLiter(s) IntraMuscular once  insulin lispro (ADMELOG) corrective regimen sliding scale   SubCutaneous three times a day before meals  insulin lispro (ADMELOG) corrective regimen sliding scale   SubCutaneous at bedtime  milrinone Infusion 0.375 MICROgram(s)/kG/Min IV Continuous <Continuous>  pantoprazole    Tablet 40 milliGRAM(s) Oral before breakfast  polyethylene glycol 3350 17 Gram(s) Oral daily  senna 2 Tablet(s) Oral at bedtime      REVIEW OF SYSTEMS:  CONSTITUTIONAL: No weakness, fevers or chills  EYES/ENT: No visual changes;  No dysphagia  NECK: No pain or stiffness  RESPIRATORY: No cough, wheezing, hemoptysis; No shortness of breath  CARDIOVASCULAR: No chest pain or palpitations; No lower extremity edema  GASTROINTESTINAL: No abdominal or epigastric pain. No nausea, vomiting, or hematemesis; No diarrhea or constipation. No melena or hematochezia.  BACK: No back pain  GENITOURINARY: No dysuria, frequency or hematuria  NEUROLOGICAL: No numbness or weakness  SKIN: No itching, burning, rashes, or lesions   MSK: R knee pain and swelling  All other review of systems is negative unless indicated above.    Physical Exam:  T(F): 97 (05-28), Max: 99.1 (05-28)  HR: 80 (05-28) (69 - 80)  BP: --  BP(mean): --  ABP: 100/64 (05-28) (74/50 - 124/79)  ABP(mean): 76 (05-28) (58 - 94)  RR: 16 (05-28)  SpO2: 95% (05-28)  GENERAL: No acute distress, well-developed  HEAD:  Atraumatic, Normocephalic  ENT: EOMI, PERRLA, conjunctiva and sclera clear, Neck supple, No JVD, moist mucosa  CHEST/LUNG: Clear to auscultation bilaterally; No wheeze, equal breath sounds bilaterally   BACK: No spinal tenderness  HEART: Regular rate and rhythm; No murmurs, rubs, or gallops  ABDOMEN: Soft, Nontender, Nondistended; Bowel sounds present  EXTREMITIES:  No clubbing, cyanosis, or edema  PSYCH: Nl behavior, nl affect  NEUROLOGY: AAOx3, non-focal, cranial nerves intact  SKIN: Normal color, No rashes or lesions.    Cardiovascular Diagnostic Testing: personally reviewed    CXR: Personally reviewed    Labs: Personally reviewed                        13.3   10.72 )-----------( 169      ( 28 May 2022 04:41 )             38.8     05-28    133<L>  |  100  |  42<H>  ----------------------------<  141<H>  4.4   |  23  |  1.66<H>    Ca    8.6      28 May 2022 04:41  Phos  2.2     05-28  Mg     2.1     05-28    TPro  6.1  /  Alb  3.1<L>  /  TBili  0.8  /  DBili  x   /  AST  19  /  ALT  17  /  AlkPhos  56  05-28    PT/INR - ( 26 May 2022 20:22 )   PT: 13.8 sec;   INR: 1.20 ratio         PTT - ( 26 May 2022 20:22 )  PTT:30.3 sec    CARDIAC MARKERS ( 26 May 2022 20:22 )  93 ng/L / x     / x     / 210 U/L / x     / 2.2 ng/mL  CARDIAC MARKERS ( 21 May 2022 16:53 )  x     / x     / 0.02 ng/mL / 122 U/L / x     / 3.5 ng/mL        Serum Pro-Brain Natriuretic Peptide: 3102 pg/mL (05-27 @ 15:23)  Serum Pro-Brain Natriuretic Peptide: 4206 pg/mL (05-26 @ 20:22)  Serum Pro-Brain Natriuretic Peptide: 2846 pg/mL (05-24 @ 17:22)        Thyroid Stimulating Hormone, Serum: 1.93 uIU/mL (05-27 @ 17:02)

## 2022-05-29 NOTE — PROGRESS NOTE ADULT - PROBLEM SELECTOR PLAN 3
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 Nipride study 5/29 with reduction in PVR to < 2 though still with elevated PA pressures   - Suspect will improve greatly with MCS unloading   - Goal PVR < 3.5 for transplant candidacy

## 2022-05-29 NOTE — PROGRESS NOTE ADULT - PROBLEM SELECTOR PLAN 7
- 100.6 fever overnight, no further fever  - F/u with BCx and UCx  - Differential: gout flare as well  - Was given empiric vanc x 1  - Remove PA catheter

## 2022-05-29 NOTE — PROGRESS NOTE ADULT - SUBJECTIVE AND OBJECTIVE BOX
GOCOOL, LENNOX  MRN-[id number]  Patient is a 66y old  Male who presents with a chief complaint of LVAD/OHT eval (28 May 2022 19:45)    HPI:  66M Mixed Ischemic/NICM Cardiomyopathy (EF 25-30% at baseline, s/p BIV-ICD), CAD (medically managed MIs in ,, Most recent stent in 2022 to mLAD) presented with multiple near syncope, found to have 41 episodes of VT and admitted initially to cardiac telemetry for further evaluation/management. Ischemic evaluation without new disease and VT ablation without substrate to complete ablation.   Transferred from [facility] for further management and evaluation for LVAD vs OHT.    (26 May 2022 20:31)      Overnight events: Febrile yesterday to 100.6 F s/p vanco x1. Milrinone increased to .5mcg/kg/min with repeat MVO2 73%, CI 3.4 and .     REVIEW OF SYSTEMS:    CONSTITUTIONAL: No weakness, fevers or chills  EYES/ENT: No visual changes;  No vertigo or throat pain   NECK: No pain or stiffness  RESPIRATORY: No cough, wheezing, hemoptysis; No shortness of breath  CARDIOVASCULAR: No chest pain or palpitations  GASTROINTESTINAL: No abdominal or epigastric pain. No nausea, vomiting, or hematemesis; No diarrhea or constipation. No melena or hematochezia.  GENITOURINARY: No dysuria, frequency or hematuria  NEUROLOGICAL: No numbness or weakness  SKIN: No itching, rashes      Physical Exam:  Vital Signs Last 24 Hrs  T(C): 36 (29 May 2022 04:00), Max: 38.1 (28 May 2022 18:00)  T(F): 96.8 (29 May 2022 04:00), Max: 100.6 (28 May 2022 18:00)  HR: 80 (29 May 2022 05:00) (80 - 80)  RR: 16 (29 May 2022 05:00) (16 - 31)  SpO2: 96% (29 May 2022 05:00) (92% - 99%)    Physical Exam:   Constitutional: NAD, well-groomed, well-developed  HEENT: PERRLA, EOMI, no drainage or redness  Neck: supple,  No JVD  Respiratory: Breath Sounds equal & clear bilaterally to auscultation, no rales/rhonchi/wheezing, no accessory muscle use noted  Cardiovascular: Regular rate, regular rhythm, normal S1, S2; no murmurs or rub  Gastrointestinal: Soft, non-tender, non distended, + bowel sounds  Extremities: BRADFORD x 4, no peripheral edema, no cyanosis, no clubbing   Neurological: A+O x 3; speech clear and intact; no sensory, motor  deficits, normal reflexes  Skin: warm, dry, well perfused      ============================I/O===========================   I&O's Detail    27 May 2022 07:01  -  28 May 2022 07:00  --------------------------------------------------------  IN:    IV PiggyBack: 350 mL    Milrinone: 157.7 mL    Milrinone: 27.5 mL    Nitroprusside: 27.2 mL    Oral Fluid: 800 mL  Total IN: 1362.4 mL    OUT:    Voided (mL): 1150 mL  Total OUT: 1150 mL    Total NET: 212.4 mL      28 May 2022 07:01  -  29 May 2022 06:32  --------------------------------------------------------  IN:    IV PiggyBack: 712.5 mL    Milrinone: 107.9 mL    Milrinone: 110 mL    Oral Fluid: 540 mL  Total IN: 1470.4 mL    OUT:    Voided (mL): 1275 mL  Total OUT: 1275 mL    Total NET: 195.4 mL        ============================ LABS =========================                        13.5   11.90 )-----------( 183      ( 29 May 2022 05:07 )             40.2         131<L>  |  100  |  33<H>  ----------------------------<  154<H>  4.2   |  22  |  1.43<H>    Ca    8.7      29 May 2022 05:06  Phos  3.7       Mg     2.4         TPro  6.4  /  Alb  3.2<L>  /  TBili  1.1  /  DBili  x   /  AST  19  /  ALT  15  /  AlkPhos  56      LIVER FUNCTIONS - ( 29 May 2022 05:06 )  Alb: 3.2 g/dL / Pro: 6.4 g/dL / ALK PHOS: 56 U/L / ALT: 15 U/L / AST: 19 U/L / GGT: x             ABG - ( 29 May 2022 05:00 )  pH, Arterial: 7.43  pH, Blood: x     /  pCO2: 33    /  pO2: 120   / HCO3: 22    / Base Excess: -1.7  /  SaO2: 98.9              Urinalysis Basic - ( 28 May 2022 18:58 )    Color: Yellow / Appearance: Clear / S.025 / pH: x  Gluc: x / Ketone: Negative  / Bili: Negative / Urobili: 2 mg/dL   Blood: x / Protein: 30 mg/dL / Nitrite: Negative   Leuk Esterase: Negative / RBC: 16 /hpf / WBC 2 /HPF   Sq Epi: x / Non Sq Epi: 0 /hpf / Bacteria: 0.0      ======================Micro/Rad/Cardio=================  Culture: Reviewed   CXR: Reviewed  Echo:Reviewed  ======================================================  PAST MEDICAL & SURGICAL HISTORY:  AV block      Essential hypertension      Chronic HFrEF (heart failure with reduced ejection fraction)      Chronic kidney disease, unspecified CKD stage      CAD (coronary artery disease)      HLD (hyperlipidemia)      Type 2 diabetes mellitus      Artificial cardiac pacemaker     GOCOOL, LENNOX  MRN-[id number]  Patient is a 66y old  Male who presents with a chief complaint of LVAD/OHT eval (28 May 2022 19:45)    HPI:  66M Mixed Ischemic/NICM Cardiomyopathy (EF 25-30% at baseline, s/p BIV-ICD), CAD (medically managed MIs in ,, Most recent stent in 2022 to mLAD) presented with multiple near syncope, found to have 41 episodes of VT and admitted initially to cardiac telemetry for further evaluation/management. Ischemic evaluation without new disease and VT ablation without substrate to complete ablation.   Transferred from [facility] for further management and evaluation for LVAD vs OHT.    (26 May 2022 20:31)      Overnight events: Febrile yesterday to 100.6 F s/p vanco x1. Milrinone increased to .5mcg/kg/min given MVO2 decrease to 57% --> repeat MVO2 73%, CI 3.4 and .     REVIEW OF SYSTEMS:    CONSTITUTIONAL: No weakness, fevers or chills  EYES/ENT: No visual changes;  No vertigo or throat pain   NECK: No pain or stiffness  RESPIRATORY: No cough, wheezing, hemoptysis; No shortness of breath  CARDIOVASCULAR: No chest pain or palpitations  GASTROINTESTINAL: No abdominal or epigastric pain. No nausea, vomiting, or hematemesis; No diarrhea or constipation. No melena or hematochezia.  GENITOURINARY: No dysuria, frequency or hematuria  NEUROLOGICAL: No numbness or weakness  SKIN: No itching, rashes      Physical Exam:  Vital Signs Last 24 Hrs  T(C): 36 (29 May 2022 04:00), Max: 38.1 (28 May 2022 18:00)  T(F): 96.8 (29 May 2022 04:00), Max: 100.6 (28 May 2022 18:00)  HR: 80 (29 May 2022 05:00) (80 - 80)  RR: 16 (29 May 2022 05:00) (16 - 31)  SpO2: 96% (29 May 2022 05:00) (92% - 99%)    Physical Exam:   Constitutional: NAD, well-groomed, well-developed  HEENT: PERRLA, EOMI, no drainage or redness  Neck: supple,  No JVD  Respiratory: Breath Sounds equal & clear bilaterally to auscultation, no rales/rhonchi/wheezing, no accessory muscle use noted  Cardiovascular: Regular rate, regular rhythm, normal S1, S2; no murmurs or rub  Gastrointestinal: Soft, non-tender, non distended, + bowel sounds  Extremities: BRADFORD x 4, no peripheral edema, no cyanosis, no clubbing   Neurological: A+O x 3; speech clear and intact; no sensory, motor  deficits, normal reflexes  Skin: warm, dry, well perfused      ============================I/O===========================   I&O's Detail    27 May 2022 07:  -  28 May 2022 07:00  --------------------------------------------------------  IN:    IV PiggyBack: 350 mL    Milrinone: 157.7 mL    Milrinone: 27.5 mL    Nitroprusside: 27.2 mL    Oral Fluid: 800 mL  Total IN: 1362.4 mL    OUT:    Voided (mL): 1150 mL  Total OUT: 1150 mL    Total NET: 212.4 mL      28 May 2022 07:01  -  29 May 2022 06:32  --------------------------------------------------------  IN:    IV PiggyBack: 712.5 mL    Milrinone: 107.9 mL    Milrinone: 110 mL    Oral Fluid: 540 mL  Total IN: 1470.4 mL    OUT:    Voided (mL): 1275 mL  Total OUT: 1275 mL    Total NET: 195.4 mL        ============================ LABS =========================                        13.5   11.90 )-----------( 183      ( 29 May 2022 05:07 )             40.2         131<L>  |  100  |  33<H>  ----------------------------<  154<H>  4.2   |  22  |  1.43<H>    Ca    8.7      29 May 2022 05:06  Phos  3.7       Mg     2.4         TPro  6.4  /  Alb  3.2<L>  /  TBili  1.1  /  DBili  x   /  AST  19  /  ALT  15  /  AlkPhos  56      LIVER FUNCTIONS - ( 29 May 2022 05:06 )  Alb: 3.2 g/dL / Pro: 6.4 g/dL / ALK PHOS: 56 U/L / ALT: 15 U/L / AST: 19 U/L / GGT: x             ABG - ( 29 May 2022 05:00 )  pH, Arterial: 7.43  pH, Blood: x     /  pCO2: 33    /  pO2: 120   / HCO3: 22    / Base Excess: -1.7  /  SaO2: 98.9              Urinalysis Basic - ( 28 May 2022 18:58 )    Color: Yellow / Appearance: Clear / S.025 / pH: x  Gluc: x / Ketone: Negative  / Bili: Negative / Urobili: 2 mg/dL   Blood: x / Protein: 30 mg/dL / Nitrite: Negative   Leuk Esterase: Negative / RBC: 16 /hpf / WBC 2 /HPF   Sq Epi: x / Non Sq Epi: 0 /hpf / Bacteria: 0.0      ======================Micro/Rad/Cardio=================  Culture: Reviewed   CXR: Reviewed  Echo:Reviewed  ======================================================  PAST MEDICAL & SURGICAL HISTORY:  AV block      Essential hypertension      Chronic HFrEF (heart failure with reduced ejection fraction)      Chronic kidney disease, unspecified CKD stage      CAD (coronary artery disease)      HLD (hyperlipidemia)      Type 2 diabetes mellitus      Artificial cardiac pacemaker

## 2022-05-29 NOTE — PROGRESS NOTE ADULT - PROBLEM SELECTOR PLAN 2
- BB on hold while in cardiogenic shock on milrinone  - ACE/ARB/ARNI/MRA on hold with JAGRUTI  - lasix 40 mg IV PRN for goal CVP 8-10 - BB on hold while in cardiogenic shock on milrinone  - ACE/ARB/ARNI/MRA on hold with JAGRUTI  - euvolemic off diuretics, goal CVP 8

## 2022-05-30 LAB
ALBUMIN SERPL ELPH-MCNC: 3.1 G/DL — LOW (ref 3.3–5)
ALP SERPL-CCNC: 58 U/L — SIGNIFICANT CHANGE UP (ref 40–120)
ALT FLD-CCNC: 16 U/L — SIGNIFICANT CHANGE UP (ref 10–45)
ANION GAP SERPL CALC-SCNC: 11 MMOL/L — SIGNIFICANT CHANGE UP (ref 5–17)
AST SERPL-CCNC: 19 U/L — SIGNIFICANT CHANGE UP (ref 10–40)
BASOPHILS # BLD AUTO: 0.06 K/UL — SIGNIFICANT CHANGE UP (ref 0–0.2)
BASOPHILS NFR BLD AUTO: 0.4 % — SIGNIFICANT CHANGE UP (ref 0–2)
BILIRUB SERPL-MCNC: 0.7 MG/DL — SIGNIFICANT CHANGE UP (ref 0.2–1.2)
BUN SERPL-MCNC: 28 MG/DL — HIGH (ref 7–23)
CALCIUM SERPL-MCNC: 8.8 MG/DL — SIGNIFICANT CHANGE UP (ref 8.4–10.5)
CHLORIDE SERPL-SCNC: 103 MMOL/L — SIGNIFICANT CHANGE UP (ref 96–108)
CO2 SERPL-SCNC: 20 MMOL/L — LOW (ref 22–31)
CREAT SERPL-MCNC: 1.33 MG/DL — HIGH (ref 0.5–1.3)
CREATININE, URINE RESULT: 84 MG/DL — SIGNIFICANT CHANGE UP
EGFR: 60 ML/MIN/1.73M2 — SIGNIFICANT CHANGE UP
EOSINOPHIL # BLD AUTO: 0.14 K/UL — SIGNIFICANT CHANGE UP (ref 0–0.5)
EOSINOPHIL NFR BLD AUTO: 0.9 % — SIGNIFICANT CHANGE UP (ref 0–6)
GLUCOSE BLDC GLUCOMTR-MCNC: 118 MG/DL — HIGH (ref 70–99)
GLUCOSE BLDC GLUCOMTR-MCNC: 131 MG/DL — HIGH (ref 70–99)
GLUCOSE BLDC GLUCOMTR-MCNC: 143 MG/DL — HIGH (ref 70–99)
GLUCOSE BLDC GLUCOMTR-MCNC: 99 MG/DL — SIGNIFICANT CHANGE UP (ref 70–99)
GLUCOSE SERPL-MCNC: 131 MG/DL — HIGH (ref 70–99)
HCT VFR BLD CALC: 39.6 % — SIGNIFICANT CHANGE UP (ref 39–50)
HGB BLD-MCNC: 13.4 G/DL — SIGNIFICANT CHANGE UP (ref 13–17)
IMM GRANULOCYTES NFR BLD AUTO: 0.4 % — SIGNIFICANT CHANGE UP (ref 0–1.5)
LACTATE SERPL-SCNC: 1 MMOL/L — SIGNIFICANT CHANGE UP (ref 0.7–2)
LYMPHOCYTES # BLD AUTO: 18.4 % — SIGNIFICANT CHANGE UP (ref 13–44)
LYMPHOCYTES # BLD AUTO: 2.74 K/UL — SIGNIFICANT CHANGE UP (ref 1–3.3)
MAGNESIUM SERPL-MCNC: 2 MG/DL — SIGNIFICANT CHANGE UP (ref 1.6–2.6)
MCHC RBC-ENTMCNC: 30.5 PG — SIGNIFICANT CHANGE UP (ref 27–34)
MCHC RBC-ENTMCNC: 33.8 GM/DL — SIGNIFICANT CHANGE UP (ref 32–36)
MCV RBC AUTO: 90.2 FL — SIGNIFICANT CHANGE UP (ref 80–100)
MONOCYTES # BLD AUTO: 1.47 K/UL — HIGH (ref 0–0.9)
MONOCYTES NFR BLD AUTO: 9.9 % — SIGNIFICANT CHANGE UP (ref 2–14)
NEUTROPHILS # BLD AUTO: 10.45 K/UL — HIGH (ref 1.8–7.4)
NEUTROPHILS NFR BLD AUTO: 70 % — SIGNIFICANT CHANGE UP (ref 43–77)
NRBC # BLD: 0 /100 WBCS — SIGNIFICANT CHANGE UP (ref 0–0)
PHOSPHATE SERPL-MCNC: 2.6 MG/DL — SIGNIFICANT CHANGE UP (ref 2.5–4.5)
PLATELET # BLD AUTO: 245 K/UL — SIGNIFICANT CHANGE UP (ref 150–400)
POTASSIUM SERPL-MCNC: 5.1 MMOL/L — SIGNIFICANT CHANGE UP (ref 3.5–5.3)
POTASSIUM SERPL-SCNC: 5.1 MMOL/L — SIGNIFICANT CHANGE UP (ref 3.5–5.3)
PROT ?TM UR-MCNC: 15 MG/DL — HIGH (ref 0–12)
PROT SERPL-MCNC: 6.6 G/DL — SIGNIFICANT CHANGE UP (ref 6–8.3)
RBC # BLD: 4.39 M/UL — SIGNIFICANT CHANGE UP (ref 4.2–5.8)
RBC # FLD: 14.8 % — HIGH (ref 10.3–14.5)
SODIUM SERPL-SCNC: 134 MMOL/L — LOW (ref 135–145)
T CRUZI AB SER-ACNC: SIGNIFICANT CHANGE UP
WBC # BLD: 14.92 K/UL — HIGH (ref 3.8–10.5)
WBC # FLD AUTO: 14.92 K/UL — HIGH (ref 3.8–10.5)

## 2022-05-30 PROCEDURE — 99232 SBSQ HOSP IP/OBS MODERATE 35: CPT | Mod: GC

## 2022-05-30 PROCEDURE — 99291 CRITICAL CARE FIRST HOUR: CPT | Mod: 25

## 2022-05-30 PROCEDURE — 93010 ELECTROCARDIOGRAM REPORT: CPT

## 2022-05-30 PROCEDURE — 99292 CRITICAL CARE ADDL 30 MIN: CPT | Mod: 25

## 2022-05-30 RX ORDER — HYDRALAZINE HCL 50 MG
25 TABLET ORAL EVERY 8 HOURS
Refills: 0 | Status: DISCONTINUED | OUTPATIENT
Start: 2022-05-30 | End: 2022-06-06

## 2022-05-30 RX ADMIN — Medication 75 MILLIGRAM(S): at 13:25

## 2022-05-30 RX ADMIN — AMIODARONE HYDROCHLORIDE 400 MILLIGRAM(S): 400 TABLET ORAL at 17:39

## 2022-05-30 RX ADMIN — AMIODARONE HYDROCHLORIDE 400 MILLIGRAM(S): 400 TABLET ORAL at 05:24

## 2022-05-30 RX ADMIN — MILRINONE LACTATE 11 MICROGRAM(S)/KG/MIN: 1 INJECTION, SOLUTION INTRAVENOUS at 13:25

## 2022-05-30 RX ADMIN — Medication 25 MILLIGRAM(S): at 23:19

## 2022-05-30 RX ADMIN — ATORVASTATIN CALCIUM 80 MILLIGRAM(S): 80 TABLET, FILM COATED ORAL at 23:18

## 2022-05-30 RX ADMIN — HEPARIN SODIUM 5000 UNIT(S): 5000 INJECTION INTRAVENOUS; SUBCUTANEOUS at 22:15

## 2022-05-30 RX ADMIN — PANTOPRAZOLE SODIUM 40 MILLIGRAM(S): 20 TABLET, DELAYED RELEASE ORAL at 05:25

## 2022-05-30 RX ADMIN — HEPARIN SODIUM 5000 UNIT(S): 5000 INJECTION INTRAVENOUS; SUBCUTANEOUS at 13:24

## 2022-05-30 RX ADMIN — Medication 10 MILLIGRAM(S): at 05:24

## 2022-05-30 RX ADMIN — CHLORHEXIDINE GLUCONATE 1 APPLICATION(S): 213 SOLUTION TOPICAL at 05:12

## 2022-05-30 RX ADMIN — Medication 81 MILLIGRAM(S): at 13:25

## 2022-05-30 RX ADMIN — Medication 25 MILLIGRAM(S): at 13:24

## 2022-05-30 RX ADMIN — HEPARIN SODIUM 5000 UNIT(S): 5000 INJECTION INTRAVENOUS; SUBCUTANEOUS at 05:25

## 2022-05-30 RX ADMIN — Medication 40 MILLIGRAM(S): at 05:25

## 2022-05-30 NOTE — PROGRESS NOTE ADULT - PROBLEM SELECTOR PLAN 3
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 Nipride study 5/29 with reduction in PVR to < 2 though still with elevated PA pressures   - Suspect will improve greatly with MCS unloading   - Goal PVR < 3.0 for transplant candidacy

## 2022-05-30 NOTE — PROGRESS NOTE ADULT - CRITICAL CARE ATTENDING COMMENT
Presented to [facility] with VT, found to be in cardiogenic shock and transferred to [facility]  A+Ox3  Cardiogenic shock, increased Milrinone and increased pacing rate to 80, SvO2 70s   Holding Toprol, continue Amio PO for VT; quiescent on tele  S/p stent in 4/22, continue ASA but holding Plavix for potential procedures/surgeries  Being evaluated for LVAD/transplant  O2 sats mid 90s on room air  DASH/diabetic diet - will transition to clears and start prep for virtual colonoscopy  Pending liver elastography  Non-oliguric JAGRUTI that is improving, CVP 6-8, autodiuresing - target even   H/H acceptable on HSQ for DVT prophylaxis   Afebrile, no antibiotics  Sugars controlled  Gout flare, refractory to Colchicine - continue Prednisone  Irene 5/26  OOB Presented to [facility] with VT, found to be in cardiogenic shock and transferred to [facility]  A+Ox3  Cardiogenic shock, on Milrinone and increased pacing rate to 80 - will continue  Holding Toprol, continue Amio PO for VT; quiescent on tele  S/p stent in 4/22, continue ASA but holding Plavix for potential procedures/surgeries  Being evaluated for LVAD/transplant  O2 sats mid 90s on room air  DASH/diabetic diet - will transition to clears and start prep for virtual colonoscopy  Pending liver elastography  Non-oliguric JAGRUTI that is improving, CVP 6-8, autodiuresing - target even   H/H acceptable on HSQ for DVT prophylaxis   Afebrile, no antibiotics  Sugars controlled  Gout flare, refractory to Colchicine - continue Prednisone  Ruth 5/26  OOB Presented to [facility] with VT, found to be in cardiogenic shock and transferred to [facility]  A+Ox3  Cardiogenic shock, on Milrinone and increased pacing rate to 80 - will continue  On Hydralazine for afterload reduction - will increase  Holding Toprol, continue Amio PO for VT; quiescent on tele  S/p stent in 4/22, continue ASA but holding Plavix for potential procedures/surgeries  Being evaluated for LVAD/transplant  O2 sats mid 90s on room air  DASH/diabetic diet - will transition to clears and start prep for virtual colonoscopy  Pending liver elastography  Non-oliguric JAGRUTI that is improving, CVP 6-8, autodiuresing - target even   H/H acceptable on HSQ for DVT prophylaxis   Afebrile, no antibiotics  Sugars controlled  Gout flare, refractory to Colchicine - continue Prednisone  Irene 5/26  OOB

## 2022-05-30 NOTE — PROGRESS NOTE ADULT - SUBJECTIVE AND OBJECTIVE BOX
GOCOOL, LENNOX  MRN-[id number]  Patient is a 66y old  Male who presents with a chief complaint of LVAD/OHT eval (30 May 2022 09:46)    HPI:  66M Mixed Ischemic/NICM Cardiomyopathy (EF 25-30% at baseline, s/p BIV-ICD), CAD (medically managed MIs in 2008,2011, Most recent stent in April 2022 to mLAD) presented with multiple near syncope, found to have 41 episodes of VT and admitted initially to cardiac telemetry for further evaluation/management. Ischemic evaluation without new disease and VT ablation without substrate to complete ablation.   Transferred from [facility] for further management and evaluation for LVAD vs OHT.    (26 May 2022 20:31)      Hospital Course:  5/26 Transferred to CCU for further management     24 HOUR EVENTS:    REVIEW OF SYSTEMS:    CONSTITUTIONAL: No weakness, fevers or chills  EYES/ENT: No visual changes;  No vertigo or throat pain   NECK: No pain or stiffness  RESPIRATORY: No cough, wheezing, hemoptysis; No shortness of breath  CARDIOVASCULAR: No chest pain or palpitations  GASTROINTESTINAL: No abdominal or epigastric pain. No nausea, vomiting, or hematemesis; No diarrhea or constipation. No melena or hematochezia.  GENITOURINARY: No dysuria, frequency or hematuria  NEUROLOGICAL: No numbness or weakness  SKIN: No itching, rashes        ICU Vital Signs Last 24 Hrs  T(C): 36.3 (30 May 2022 16:00), Max: 37 (30 May 2022 12:00)  T(F): 97.4 (30 May 2022 16:00), Max: 98.6 (30 May 2022 12:00)  HR: 80 (30 May 2022 19:00) (80 - 82)  BP: 115/66 (30 May 2022 19:00) (110/67 - 119/73)  BP(mean): 86 (30 May 2022 19:00) (83 - 98)  ABP: 95/53 (30 May 2022 11:00) (92/59 - 134/67)  ABP(mean): 68 (30 May 2022 11:00) (68 - 94)  RR: 21 (30 May 2022 19:00) (15 - 32)  SpO2: 92% (30 May 2022 19:00) (91% - 97%)      CVP(mm Hg): 1 (05-29-22 @ 10:00) (1 - 240)  CO: 6 (05-29-22 @ 06:00) (6 - 6)  CI: 3.1 (05-29-22 @ 06:00) (3.1 - 3.1)  PA: 83/43 (05-29-22 @ 11:00) (57/25 - 83/43)  PA(mean): 59 (05-29-22 @ 11:00) (38 - 59)  PA(direct): --  PCWP: --  LA: --  RA: --  SVR: --  SVRI: --  PVR: --  PVRI: --  I&O's Summary    29 May 2022 07:01  -  30 May 2022 07:00  --------------------------------------------------------  IN: 1104 mL / OUT: 1850 mL / NET: -746 mL    30 May 2022 07:01  -  30 May 2022 20:03  --------------------------------------------------------  IN: 1669 mL / OUT: 1325 mL / NET: 344 mL        CAPILLARY BLOOD GLUCOSE    CAPILLARY BLOOD GLUCOSE      POCT Blood Glucose.: 118 mg/dL (30 May 2022 17:03)      PHYSICAL EXAM:  Constitutional: NAD, well-groomed, well-developed, 2L NC  HEENT: PERRLA, EOMI, no drainage or redness  Neck: supple,  No JVD  Respiratory: Breath Sounds equal & clear bilaterally to auscultation, no rales/rhonchi/wheezing, no accessory muscle use noted  Cardiovascular: Regular rate, regular rhythm, normal S1, S2; no murmurs or rub  Gastrointestinal: Soft, non-tender, non distended, + bowel sounds  Extremities: BRADFORD x 4, R knee: warmth, mild tenderness, no peripheral edema, no cyanosis, no clubbing   Neurological: A+O x 3; speech clear and intact; no sensory, motor  deficits, normal reflexes  Skin: warm, dry, well perfused  Incisions: L radial leilani: c/d/i    ============================I/O===========================   I&O's Detail    29 May 2022 07:01  -  30 May 2022 07:00  --------------------------------------------------------  IN:    Milrinone: 264 mL    Oral Fluid: 840 mL  Total IN: 1104 mL    OUT:    Voided (mL): 1850 mL  Total OUT: 1850 mL    Total NET: -746 mL      30 May 2022 07:01  -  30 May 2022 20:03  --------------------------------------------------------  IN:    Milrinone: 132 mL    Oral Fluid: 1537 mL  Total IN: 1669 mL    OUT:    Voided (mL): 1325 mL  Total OUT: 1325 mL    Total NET: 344 mL        ============================ LABS =========================                        13.4   14.92 )-----------( 245      ( 30 May 2022 04:47 )             39.6     05-30    134<L>  |  103  |  28<H>  ----------------------------<  131<H>  5.1   |  20<L>  |  1.33<H>    Ca    8.8      30 May 2022 04:47  Phos  2.6     05-30  Mg     2.0     05-30    TPro  6.6  /  Alb  3.1<L>  /  TBili  0.7  /  DBili  x   /  AST  19  /  ALT  16  /  AlkPhos  58  05-30                LIVER FUNCTIONS - ( 30 May 2022 04:47 )  Alb: 3.1 g/dL / Pro: 6.6 g/dL / ALK PHOS: 58 U/L / ALT: 16 U/L / AST: 19 U/L / GGT: x             ABG - ( 29 May 2022 05:00 )  pH, Arterial: 7.43  pH, Blood: x     /  pCO2: 33    /  pO2: 120   / HCO3: 22    / Base Excess: -1.7  /  SaO2: 98.9              Lactate, Blood: 1.0 mmol/L (05-30-22 @ 04:47)  Blood Gas Arterial, Lactate: 0.9 mmol/L (05-29-22 @ 05:00)  Blood Gas Arterial, Lactate: 1.8 mmol/L (05-28-22 @ 22:18)  Blood Gas Arterial, Lactate: 1.6 mmol/L (05-28-22 @ 19:30)  Blood Gas Arterial, Lactate: 1.2 mmol/L (05-28-22 @ 04:15)      ======================Micro/Rad/Cardio=================  Telemtry: Reviewed   EKG: Reviewed  CXR: Reviewed  Culture: Reviewed   Echo:   Cath:   ======================================================  PAST MEDICAL & SURGICAL HISTORY:  AV block      Essential hypertension      Chronic HFrEF (heart failure with reduced ejection fraction)      Chronic kidney disease, unspecified CKD stage      CAD (coronary artery disease)      HLD (hyperlipidemia)      Type 2 diabetes mellitus      Artificial cardiac pacemaker        ====================ASSESSMENT ==============  65 y/o male w/ PMHx HTN, HLD, type 2 DM, chronic HFrEF (EF 25-30% by Echo), complete heart block s/p PPM (in 2006, upgraded to BiV-ICD in 09/2016), CAD (s/p recent BERNABE mid LAD at [facility] on 04/18/2022), and stage II CKD (baseline Cr ~1.3) presented with near syncope to OSH found to have 41 episodes of VT on device, s/p unsuccessful VT ablation and transferred to [facility] for management of cardiogenic shock and advanced therapy eval.                 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  - SPO2 94-95% on % % RA      ====================CARDIOVASCULAR==================  Cardiogenic shock   - TTE 5/21: LV 5.2 cm, LVEF 10-15%, LVOT VTI 10 cm, moderate RV dysfunction, mild AI, minimal MR  - [facility] 5/23: patent mLAD stent with slow flow, D1 with 40-50% stenosis  - [facility] 5/26: RA 12, PA 70/40 (50), PCWP 22, Milana CO/CI 2.3/1.3, MAP 83 with SVR 2469, PVR 12   - 5/27: RA 10, PA 76/35 (49), PCWP 34, PA sat 57% with Milana CO/CI 3.1/1.6, MAP 71 with SVR 1574, PVR 4.8  - Maintain Milrinone .5mcg/kg/min   - monitor strict IOs and daily wts   - Undergoing VAD/OHT workup, Plan for CT colo tomorrow- prep today, US elasto pending  - low threshold for escalation to IABP if hemodynamics worsen     VTach   - s/p EPS on 5/24, unable to perform ablation as no substrate found and did not induce VT because of severely low EF   - Interrogation of ICD @Dr Wilson's office 5/20: back-to-back episodes of VT @ 200+ bpm all terminating with ATP. Since last cath pt has had 41 VT episodes (all falling into VF zone)  - Normal device function. BIV pacing 97%. No programming changes made  -. c/w Amiodarone 400 mg PO BID load, monitor LFTs   - BB held due to cardiogenic shock  - undergoing OHT/LVAD eval as above   - no further VT on inotropes     CAD   - Chest pain free and compliant with DAPT since last PCI 4/18/22  - Cardiac cath @[facility] 4/18/22: mLAD 90% s/p BERNABE, LCx mild disease, RCA mild disease s/p diagnostic cardiac cath w/ Dr Wagner on 05/23/2022   - Cont ASA and Lipitor   - per discussion with interventional at OSH, ok to DC plavix at this time     aspirin enteric coated 81 milliGRAM(s) Oral daily  atorvastatin 80 milliGRAM(s) Oral at bedtime  aMIOdarone    Tablet 400 milliGRAM(s) Oral two times a day  hydrALAZINE 25 milliGRAM(s) Oral every 8 hours  milrinone Infusion 0.5 MICROgram(s)/kG/Min (11 mL/Hr) IV Continuous <Continuous>    ===================HEMATOLOGIC/ONC ===================  - No active issues  - HSQ for DVT ppx       heparin   Injectable 5000 Unit(s) SubCutaneous every 8 hours    ===================== RENAL =========================  JAGRUTI on CKD II  Admitted w/ Cr 2.01 (baseline Cr ~1.3) now trending down   - Avoid nephrotoxic agents, NSAIDs. Renally dose meds.  - monitor strict IOs and continue current cardiac support       ==================== GASTROINTESTINAL===================  - No active issues  - clear liquid diet, NPO @ MN  - Cont. PPI and bowel regimen w/ Miralax and senna, last BM 5/28  - Plan for CT colo tomorrow/ prep today      pantoprazole    Tablet 40 milliGRAM(s) Oral before breakfast  polyethylene glycol 3350 17 Gram(s) Oral two times a day  senna 2 Tablet(s) Oral at bedtime    =======================    ENDOCRINE  =====================  T2DM (A1C 6.2 on admission)  - Cont. ISS, glucose controlled, monitor FS closely now that pt is on steroids     Gout flare, right knee   - continue renally dosed allopurinol   - Day #3 of 5, prednisone 40mg     allopurinol 75 milliGRAM(s) Oral daily  insulin lispro (ADMELOG) corrective regimen sliding scale   SubCutaneous three times a day before meals  insulin lispro (ADMELOG) corrective regimen sliding scale   SubCutaneous at bedtime  predniSONE   Tablet 40 milliGRAM(s) Oral daily    ========================INFECTIOUS DISEASE================  Febrile 5/28, cordis and swan discontinued  - infectious workup sent including RVP (negative), UA(negative), and BCx x2 (prelim NGTD)  - vanco x1 given  - trend fever and wbc      Patient requires continuous monitoring with bedside rhythm monitoring, pulse ox monitoring, and intermittent blood gas analysis. Care plan discussed with ICU care team. Patient remained critical and at risk for life threatening decompensation.  Patient seen, examined and plan discussed with CCU team during rounds.     I have personally provided ____ minutes of critical care time excluding time spent on separate procedures, in addition to initial critical care time provided by the CICU Attending, Dr. Leonardo.    By signing my name below, I, Camila Martinez, attest that this documentation has been prepared under the direction and in the presence of RAMSES Longo   Electronically signed: Mini Lassiter, 05-30-22 @ 20:03    I, RAMSES Longo, personally performed the services described in this documentation. all medical record entries made by the scribe were at my direction and in my presence. I have reviewed the chart and agree that the record reflects my personal performance and is accurate and complete  Electronically signed: RAMSES Longo          GOCOOL, LENNOX  MRN-[id number]  Patient is a 66y old  Male who presents with a chief complaint of LVAD/OHT eval (30 May 2022 09:46)    HPI:  66M Mixed Ischemic/NICM Cardiomyopathy (EF 25-30% at baseline, s/p BIV-ICD), CAD (medically managed MIs in 2008,2011, Most recent stent in April 2022 to mLAD) presented with multiple near syncope, found to have 41 episodes of VT and admitted initially to cardiac telemetry for further evaluation/management. Ischemic evaluation without new disease and VT ablation without substrate to complete ablation.   Transferred from [facility] for further management and evaluation for LVAD vs OHT.    (26 May 2022 20:31)      24 HOUR EVENTS:  Hydral increased 10-> 25  LR leilani d/c     REVIEW OF SYSTEMS:    CONSTITUTIONAL: No weakness, fevers or chills  EYES/ENT: No visual changes;  No vertigo or throat pain   NECK: No pain or stiffness  RESPIRATORY: No cough, wheezing, hemoptysis; No shortness of breath  CARDIOVASCULAR: No chest pain or palpitations  GASTROINTESTINAL: No abdominal or epigastric pain. No nausea, vomiting, or hematemesis; No diarrhea or constipation. No melena or hematochezia.  GENITOURINARY: No dysuria, frequency or hematuria  NEUROLOGICAL: No numbness or weakness  SKIN: No itching, rashes        ICU Vital Signs Last 24 Hrs  T(C): 36.3 (30 May 2022 16:00), Max: 37 (30 May 2022 12:00)  T(F): 97.4 (30 May 2022 16:00), Max: 98.6 (30 May 2022 12:00)  HR: 80 (30 May 2022 19:00) (80 - 82)  BP: 115/66 (30 May 2022 19:00) (110/67 - 119/73)  BP(mean): 86 (30 May 2022 19:00) (83 - 98)  ABP: 95/53 (30 May 2022 11:00) (92/59 - 134/67)  ABP(mean): 68 (30 May 2022 11:00) (68 - 94)  RR: 21 (30 May 2022 19:00) (15 - 32)  SpO2: 92% (30 May 2022 19:00) (91% - 97%)      CVP(mm Hg): 1 (05-29-22 @ 10:00) (1 - 240)  CO: 6 (05-29-22 @ 06:00) (6 - 6)  CI: 3.1 (05-29-22 @ 06:00) (3.1 - 3.1)  PA: 83/43 (05-29-22 @ 11:00) (57/25 - 83/43)  PA(mean): 59 (05-29-22 @ 11:00) (38 - 59)    I&O's Summary    29 May 2022 07:01  -  30 May 2022 07:00  --------------------------------------------------------  IN: 1104 mL / OUT: 1850 mL / NET: -746 mL    30 May 2022 07:01  -  30 May 2022 20:03  --------------------------------------------------------  IN: 1669 mL / OUT: 1325 mL / NET: 344 mL        CAPILLARY BLOOD GLUCOSE    CAPILLARY BLOOD GLUCOSE      POCT Blood Glucose.: 118 mg/dL (30 May 2022 17:03)    PHYSICAL EXAM:  Constitutional: Patient laying in bed, NAD  Head: Atraumatic, normocephalic  Eyes: No scleral icterus. PERRLA. EOMI  ENMT: Moist mucous membrane. Uvula midline  Neck: Supple, No JVD  Respiratory: CTA B/L. No wheezes, rales or rhonchi   Cardiovascular: S1/S2. No murmurs, rubs, or gallops.  Gastrointestinal: Nondistended, BSx4, soft, nontender.  Extremities: Moves all extremities. Warm, no edema, nontender  Vascular: 2+ DP/PT pulses B/L   Neurological: A&Ox3. Follows commands. No focal deficits.   Skin: No rashes  on exposed skin     ============================I/O===========================   I&O's Detail    29 May 2022 07:01  -  30 May 2022 07:00  --------------------------------------------------------  IN:    Milrinone: 264 mL    Oral Fluid: 840 mL  Total IN: 1104 mL    OUT:    Voided (mL): 1850 mL  Total OUT: 1850 mL    Total NET: -746 mL      30 May 2022 07:01  -  30 May 2022 20:03  --------------------------------------------------------  IN:    Milrinone: 132 mL    Oral Fluid: 1537 mL  Total IN: 1669 mL    OUT:    Voided (mL): 1325 mL  Total OUT: 1325 mL    Total NET: 344 mL        ============================ LABS =========================                        13.4   14.92 )-----------( 245      ( 30 May 2022 04:47 )             39.6     05-30    134<L>  |  103  |  28<H>  ----------------------------<  131<H>  5.1   |  20<L>  |  1.33<H>    Ca    8.8      30 May 2022 04:47  Phos  2.6     05-30  Mg     2.0     05-30    TPro  6.6  /  Alb  3.1<L>  /  TBili  0.7  /  DBili  x   /  AST  19  /  ALT  16  /  AlkPhos  58  05-30                LIVER FUNCTIONS - ( 30 May 2022 04:47 )  Alb: 3.1 g/dL / Pro: 6.6 g/dL / ALK PHOS: 58 U/L / ALT: 16 U/L / AST: 19 U/L / GGT: x             ABG - ( 29 May 2022 05:00 )  pH, Arterial: 7.43  pH, Blood: x     /  pCO2: 33    /  pO2: 120   / HCO3: 22    / Base Excess: -1.7  /  SaO2: 98.9              Lactate, Blood: 1.0 mmol/L (05-30-22 @ 04:47)  Blood Gas Arterial, Lactate: 0.9 mmol/L (05-29-22 @ 05:00)  Blood Gas Arterial, Lactate: 1.8 mmol/L (05-28-22 @ 22:18)  Blood Gas Arterial, Lactate: 1.6 mmol/L (05-28-22 @ 19:30)  Blood Gas Arterial, Lactate: 1.2 mmol/L (05-28-22 @ 04:15)      ======================Micro/Rad/Cardio=================  Telemtry: Reviewed   EKG: Reviewed  CXR: Reviewed  Culture: Reviewed   Echo:   Cath:   ======================================================  PAST MEDICAL & SURGICAL HISTORY:  AV block      Essential hypertension      Chronic HFrEF (heart failure with reduced ejection fraction)      Chronic kidney disease, unspecified CKD stage      CAD (coronary artery disease)      HLD (hyperlipidemia)      Type 2 diabetes mellitus      Artificial cardiac pacemaker        ====================ASSESSMENT ==============  67 y/o male w/ PMHx HTN, HLD, type 2 DM, chronic HFrEF (EF 25-30% by Echo), complete heart block s/p PPM (in 2006, upgraded to BiV-ICD in 09/2016), CAD (s/p recent BERNABE mid LAD at [facility] on 04/18/2022), and stage II CKD (baseline Cr ~1.3) presented with near syncope to OSH found to have 41 episodes of VT on device, s/p unsuccessful VT ablation and transferred to [facility] for management of cardiogenic shock and advanced therapy eval.     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 Nipride study 5/29 with reduction in PVR to < 2 though still with elevated PA pressures   - comfortable on room air, continue to monitor Spo2 with goal >94%      ====================CARDIOVASCULAR==================  Cardiogenic shock   - TTE 5/21: LV 5.2 cm, LVEF 10-15%, LVOT VTI 10 cm, moderate RV dysfunction, mild AI, minimal MR  - C 5/23: patent mLAD stent with slow flow, D1 with 40-50% stenosis  - [facility] 5/26: RA 12, PA 70/40 (50), PCWP 22, Milana CO/CI 2.3/1.3, MAP 83 with SVR 2469, PVR 12 PERSAUD  - 5/27: RA 10, PA 76/35 (49), PCWP 34, PA sat 57% with Milana CO/CI 3.1/1.6, MAP 71 with SVR 1574, PVR 4.8  - Maintain Milrinone .5mcg/kg/min.  monitor perfusion indices and lactate since swan removed  - monitor strict IOs and daily wts   - Undergoing VAD/OHT workup, Plan for CT colo tomorrow- prep today, US elasto pending- likely tomorrow   - low threshold for escalation to IABP if hemodynamics worsen     VTach   - s/p EPS on 5/24, unable to perform ablation as no substrate found and did not induce VT because of severely low EF   - Interrogation of ICD @Dr Wilson's office 5/20: back-to-back episodes of VT @ 200+ bpm all terminating with ATP. Since last cath pt has had 41 VT episodes (all falling into VF zone)  - Normal device function. BIV pacing 97%. No programming changes made  -. c/w Amiodarone 400 mg PO BID load, monitor LFTs   - BB held due to cardiogenic shock  - undergoing OHT/LVAD eval as above   - no further VT on inotropes     CAD   - Chest pain free and compliant with DAPT since last PCI 4/18/22  - Cardiac cath @[facility] 4/18/22: mLAD 90% s/p BERNABE, LCx mild disease, RCA mild disease s/p diagnostic cardiac cath w/ Dr Wagner on 05/23/2022   - Cont ASA and Lipitor   - per discussion with interventional at OSH, ok to DC plavix at this time     aspirin enteric coated 81 milliGRAM(s) Oral daily  atorvastatin 80 milliGRAM(s) Oral at bedtime  aMIOdarone    Tablet 400 milliGRAM(s) Oral two times a day  hydrALAZINE 25 milliGRAM(s) Oral every 8 hours  milrinone Infusion 0.5 MICROgram(s)/kG/Min (11 mL/Hr) IV Continuous <Continuous>    ===================HEMATOLOGIC/ONC ===================  H/H and plts stable  - continue to monitor CBC  - HSQ for DVT ppx       heparin   Injectable 5000 Unit(s) SubCutaneous every 8 hours    ===================== RENAL =========================  JAGRUTI on CKD II, now improved   Admitted w/ Cr 2.01 (baseline Cr ~1.3) now trending down and approaching baseline.   - Avoid nephrotoxic agents, NSAIDs. Renally dose meds.  - monitor strict IOs and continue current cardiac support       ==================== GASTROINTESTINAL===================  - clear liquid diet, NPO @ MN  - Cont. PPI and bowel regimen w/ Miralax and senna  - Plan for CT colo tomorrow/ prep today      pantoprazole    Tablet 40 milliGRAM(s) Oral before breakfast  polyethylene glycol 3350 17 Gram(s) Oral two times a day  senna 2 Tablet(s) Oral at bedtime    =======================    ENDOCRINE  =====================  T2DM (A1C 6.2 on admission)  - Cont. ISS, glucose controlled, monitor FS closely now that pt is on steroids     Gout flare, right knee   - continue renally dosed allopurinol   - Day #3 of 5, prednisone 40mg     allopurinol 75 milliGRAM(s) Oral daily  insulin lispro (ADMELOG) corrective regimen sliding scale   SubCutaneous three times a day before meals  insulin lispro (ADMELOG) corrective regimen sliding scale   SubCutaneous at bedtime  predniSONE   Tablet 40 milliGRAM(s) Oral daily    ========================INFECTIOUS DISEASE================  Febrile 5/28, cordis and swan discontinued  - infectious workup sent including RVP (negative), UA(negative), and BCx x2 (prelim NGTD)  - vanco x1 given  - trend fever and wbc      Patient requires continuous monitoring with bedside rhythm monitoring, pulse ox monitoring, and intermittent blood gas analysis. Care plan discussed with ICU care team. Patient remained critical and at risk for life threatening decompensation.  Patient seen, examined and plan discussed with CCU team during rounds.     I have personally provided _>35___ minutes of critical care time excluding time spent on separate procedures, in addition to initial critical care time provided by the CICU Attending, Dr. Leonardo.    By signing my name below, I, Camila Martinez, attest that this documentation has been prepared under the direction and in the presence of RAMSES Longo   Electronically signed: Mini Lassiter, 05-30-22 @ 20:03    I, RAMSES Longo, personally performed the services described in this documentation. all medical record entries made by the scribe were at my direction and in my presence. I have reviewed the chart and agree that the record reflects my personal performance and is accurate and complete  Electronically signed: RAMSES Longo

## 2022-05-30 NOTE — PROGRESS NOTE ADULT - PROBLEM SELECTOR PLAN 1
- C/w milrinone to 0.5 mcg/kg/min  - CI/CO improved after AV pacing rate increased to 80  - Continue to keep MAP ~70 with hydralazine 10mg TID   - PAC/cordis discontinued 05/29 given fever   - Pending advanced therapies eval for LVAD/transplant, needs colonoscopy and liver elastography. Of note he had within a 24 hour span on 5/28 a SBP < 90, PCWP > 20, and CI of 1.7 on inotropes - C/w milrinone to 0.5 mcg/kg/min  - CI/CO improved after AV pacing rate increased to 80  - Continue to keep MAP ~70 with hydralazine 25mg TID   - PAC/cordis discontinued 05/29 given fever   - Pending advanced therapies eval for LVAD/transplant, needs colonoscopy and liver elastography. Of note he had within a 24 hour span on 5/28 a SBP < 90, PCWP > 20, and CI of 1.7 on inotropes  - For now will transfer out of CICU - C/w milrinone to 0.5 mcg/kg/min  - CI/CO improved after AV pacing rate increased to 80  - Increase hydralizine to 25 mg TID   - PAC/cordis discontinued 05/29 given fever   - Pending advanced therapies eval for LVAD/transplant (ABO A, PRA 0%), needs colonoscopy (virtual pending tomorrow). Of note he had within a 24 hour span on 5/27-5/28 a SBP < 90, PCWP > 20, and CI of 1.7 on inotropes  - OK to transfer out of CICU

## 2022-05-30 NOTE — PROGRESS NOTE ADULT - SUBJECTIVE AND OBJECTIVE BOX
GOCOOL, LENNOX  MRN-[id number]  Patient is a 66y old  Male who presents with a chief complaint of LVAD/OHT eval (29 May 2022 19:32)    HPI:  66M Mixed Ischemic/NICM Cardiomyopathy (EF 25-30% at baseline, s/p BIV-ICD), CAD (medically managed MIs in ,, Most recent stent in 2022 to mLAD) presented with multiple near syncope, found to have 41 episodes of VT and admitted initially to cardiac telemetry for further evaluation/management. Ischemic evaluation without new disease and VT ablation without substrate to complete ablation.   Transferred from [facility] for further management and evaluation for LVAD vs OHT.    (26 May 2022 20:31)      Surgery/Hospital course:    Overnight events:    REVIEW OF SYSTEMS:    CONSTITUTIONAL: No weakness, fevers or chills  EYES/ENT: No visual changes;  No vertigo or throat pain   NECK: No pain or stiffness  RESPIRATORY: No cough, wheezing, hemoptysis; No shortness of breath  CARDIOVASCULAR: No chest pain or palpitations  GASTROINTESTINAL: No abdominal or epigastric pain. No nausea, vomiting, or hematemesis; No diarrhea or constipation. No melena or hematochezia.  GENITOURINARY: No dysuria, frequency or hematuria  NEUROLOGICAL: No numbness or weakness  SKIN: No itching, rashes      Physical Exam:  Vital Signs Last 24 Hrs  T(C): 36.6 (30 May 2022 03:00), Max: 36.8 (29 May 2022 19:00)  T(F): 97.8 (30 May 2022 03:00), Max: 98.2 (29 May 2022 19:00)  HR: 80 (30 May 2022 06:00) (80 - 80)  BP: --  BP(mean): --  RR: 19 (30 May 2022 06:00) (15 - 26)  SpO2: 95% (30 May 2022 06:00) (91% - 97%)  Physical Exam:   Constitutional: NAD, well-groomed, well-developed  HEENT: PERRLA, EOMI, no drainage or redness  Neck: supple,  No JVD  Respiratory: Breath Sounds equal & clear bilaterally to auscultation, no rales/rhonchi/wheezing, no accessory muscle use noted  Cardiovascular: Regular rate, regular rhythm, normal S1, S2; no murmurs or rub  Gastrointestinal: Soft, non-tender, non distended, + bowel sounds  Extremities: BRADFORD x 4, no peripheral edema, no cyanosis, no clubbing   Neurological: A+O x 3; speech clear and intact; no sensory, motor  deficits, normal reflexes  Skin: warm, dry, well perfused  Incisions:    ============================I/O===========================   I&O's Detail    28 May 2022 07:01  -  29 May 2022 07:00  --------------------------------------------------------  IN:    IV PiggyBack: 712.5 mL    Milrinone: 110 mL    Milrinone: 107.9 mL    Oral Fluid: 540 mL  Total IN: 1470.4 mL    OUT:    Voided (mL): 1275 mL  Total OUT: 1275 mL    Total NET: 195.4 mL      29 May 2022 07:  -  30 May 2022 06:43  --------------------------------------------------------  IN:    Milrinone: 264 mL    Oral Fluid: 840 mL  Total IN: 1104 mL    OUT:    Voided (mL): 1850 mL  Total OUT: 1850 mL    Total NET: -746 mL        ============================ LABS =========================                        13.4   14.92 )-----------( 245      ( 30 May 2022 04:47 )             39.6     05-30    134<L>  |  103  |  28<H>  ----------------------------<  131<H>  5.1   |  20<L>  |  1.33<H>    Ca    8.8      30 May 2022 04:47  Phos  2.6     05-30  Mg     2.0     05-30    TPro  6.6  /  Alb  3.1<L>  /  TBili  0.7  /  DBili  x   /  AST  19  /  ALT  16  /  AlkPhos  58  05-30    LIVER FUNCTIONS - ( 30 May 2022 04:47 )  Alb: 3.1 g/dL / Pro: 6.6 g/dL / ALK PHOS: 58 U/L / ALT: 16 U/L / AST: 19 U/L / GGT: x             ABG - ( 29 May 2022 05:00 )  pH, Arterial: 7.43  pH, Blood: x     /  pCO2: 33    /  pO2: 120   / HCO3: 22    / Base Excess: -1.7  /  SaO2: 98.9              Urinalysis Basic - ( 28 May 2022 18:58 )    Color: Yellow / Appearance: Clear / S.025 / pH: x  Gluc: x / Ketone: Negative  / Bili: Negative / Urobili: 2 mg/dL   Blood: x / Protein: 30 mg/dL / Nitrite: Negative   Leuk Esterase: Negative / RBC: 16 /hpf / WBC 2 /HPF   Sq Epi: x / Non Sq Epi: 0 /hpf / Bacteria: 0.0      ======================Micro/Rad/Cardio=================  Culture: Reviewed   CXR: Reviewed  Echo:Reviewed  ======================================================  PAST MEDICAL & SURGICAL HISTORY:  AV block      Essential hypertension      Chronic HFrEF (heart failure with reduced ejection fraction)      Chronic kidney disease, unspecified CKD stage      CAD (coronary artery disease)      HLD (hyperlipidemia)      Type 2 diabetes mellitus      Artificial cardiac pacemaker      Assessment/Plan:  67 y/o male w/ PMHx HTN, HLD, type 2 DM, chronic HFrEF (EF 25-30% by Echo), complete heart block s/p PPM (in , upgraded to BiV-ICD in 2016), CAD (s/p recent BERNABE mid LAD at [facility] on 2022), and stage II CKD (baseline Cr ~1.3) presented with near syncope to OSH found to have 41 episodes of VT on device, s/p unsuccessful VT ablation and transferred to [facility] for management of cardiogenic shock and advanced therapy eval.     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  - SPO2 94-95% on room air     Cardiovascular  # Cardiogenic shock   - TTE : LV 5.2 cm, LVEF 10-15%, LVOT VTI 10 cm, moderate RV dysfunction, mild AI, minimal MR  - [facility] : patent mLAD stent with slow flow, D1 with 40-50% stenosis  - [facility] : RA 12, PA 70/40 (50), PCWP 22, Milana CO/CI 2.3/1.3, MAP 83 with SVR 2469, PVR 12 PERSAUD  - : RA 10, PA 76/35 (49), PCWP 34, PA sat 57% with Milana CO/CI 3.1/1.6, MAP 71 with SVR 1574, PVR 4.8  - Maintain Milrinone .5mcg/kg/min   - goal CVP 8-10, has been single digits, holding diuresis  - monitor strict IOs and daily wts   - Undergoing VAD/OHT workup, Plan for CT colo tuesday, US elasto pending  - low threshold for escalation to IABP if hemodynamics worsen     # VTach   - s/p EPS on , unable to perform ablation as no substrate found and did not induce VT because of severely low EF   - Interrogation of ICD @Dr Wilson's office : back-to-back episodes of VT @ 200+ bpm all terminating with ATP. Since last cath pt has had 41 VT episodes (all falling into VF zone)  - Normal device function. BIV pacing 97%. No programming changes made  -. c/w Amiodarone 400 mg PO BID load, monitor LFTs   - BB held due to cardiogenic shock  - undergoing OHT/LVAD eval as above   - no further VT on inotropes     # CAD   - Chest pain free and compliant with DAPT since last PCI 22  - Cardiac cath @[facility] 22: mLAD 90% s/p BERNABE, LCx mild disease, RCA mild disease s/p diagnostic cardiac cath w/ Dr Wagner on 2022   - Cont ASA and Lipitor   - per discussion with interventional at OSH, ok to DC plavix at this time     GI  - No active issues  - DASH diet w/ 1500ml Fluid restriction  - Cont. PPI and bowel regimen, last BM   - Will need CT colo this week     Renal  # JAGRUTI on CKD II  Admitted w/ Cr 2.01 (baseline Cr ~1.3) now trending down   - Avoid nephrotoxic agents, NSAIDs. Renally dose meds.  - monitor strict IOs and continue current cardiac support     Heme/Onc  - No active issues  - HSQ for DVT ppx     Endo   # T2DM (A1C 6.2 on admission)  - Cont. ISS, glucose controlled, monitor FS closely now that pt is on steroids     MSK  # Gout flare, right knee   - continue renally dosed colchicine and allopurinol   - started on prednisone 40mg x5 days     ID  # Febrile , cordis and swan discontinued  - infectious workup sent including RVP (negative), UA(negative), and BCx x2  - vanco x1 given  - trend fever and wbc  - plan to dc swan/intro once bedside nipride study is done     Lines  HIREN Bonilla (OSH )           GOCOOL, LENNOX  MRN-[id number]  Patient is a 66y old  Male who presents with a chief complaint of LVAD/OHT eval (29 May 2022 19:32)    HPI:  66M Mixed Ischemic/NICM Cardiomyopathy (EF 25-30% at baseline, s/p BIV-ICD), CAD (medically managed MIs in ,, Most recent stent in 2022 to mLAD) presented with multiple near syncope, found to have 41 episodes of VT and admitted initially to cardiac telemetry for further evaluation/management. Ischemic evaluation without new disease and VT ablation without substrate to complete ablation.   Transferred from [facility] for further management and evaluation for LVAD vs OHT.    (26 May 2022 20:31)      Overnight events:  - no acute events overnight  - no specific complaints this morning  - afebrile and HD stable  - R knee pain improved    REVIEW OF SYSTEMS:    CONSTITUTIONAL: No weakness, fevers or chills  EYES/ENT: No visual changes;  No vertigo or throat pain   NECK: No pain or stiffness  RESPIRATORY: No cough, wheezing, hemoptysis; No shortness of breath  CARDIOVASCULAR: No chest pain or palpitations  GASTROINTESTINAL: No abdominal or epigastric pain. No nausea, vomiting, or hematemesis; No diarrhea or constipation. No melena or hematochezia.  GENITOURINARY: No dysuria, frequency or hematuria  NEUROLOGICAL: No numbness or weakness  SKIN: No itching, rashes      Physical Exam:  Vital Signs Last 24 Hrs  T(C): 36.6 (30 May 2022 03:00), Max: 36.8 (29 May 2022 19:00)  T(F): 97.8 (30 May 2022 03:00), Max: 98.2 (29 May 2022 19:00)  HR: 80 (30 May 2022 06:00) (80 - 80)  BP: --  BP(mean): --  RR: 19 (30 May 2022 06:00) (15 - 26)  SpO2: 95% (30 May 2022 06:00) (91% - 97%)    Physical Exam:   Constitutional: NAD, well-groomed, well-developed, 2L NC  HEENT: PERRLA, EOMI, no drainage or redness  Neck: supple,  No JVD  Respiratory: Breath Sounds equal & clear bilaterally to auscultation, no rales/rhonchi/wheezing, no accessory muscle use noted  Cardiovascular: Regular rate, regular rhythm, normal S1, S2; no murmurs or rub  Gastrointestinal: Soft, non-tender, non distended, + bowel sounds  Extremities: BRADFORD x 4, R knee: warmth, mild tenderness, no peripheral edema, no cyanosis, no clubbing   Neurological: A+O x 3; speech clear and intact; no sensory, motor  deficits, normal reflexes  Skin: warm, dry, well perfused  Incisions: L radial leilani: c/d/i    ============================I/O===========================   I&O's Detail    28 May 2022 07:01  -  29 May 2022 07:00  --------------------------------------------------------  IN:    IV PiggyBack: 712.5 mL    Milrinone: 110 mL    Milrinone: 107.9 mL    Oral Fluid: 540 mL  Total IN: 1470.4 mL    OUT:    Voided (mL): 1275 mL  Total OUT: 1275 mL    Total NET: 195.4 mL      29 May 2022 07:01  -  30 May 2022 06:43  --------------------------------------------------------  IN:    Milrinone: 264 mL    Oral Fluid: 840 mL  Total IN: 1104 mL    OUT:    Voided (mL): 1850 mL  Total OUT: 1850 mL    Total NET: -746 mL        ============================ LABS =========================                        13.4   14.92 )-----------( 245      ( 30 May 2022 04:47 )             39.6     05-30    134<L>  |  103  |  28<H>  ----------------------------<  131<H>  5.1   |  20<L>  |  1.33<H>    Ca    8.8      30 May 2022 04:47  Phos  2.6     05-30  Mg     2.0     05-30    TPro  6.6  /  Alb  3.1<L>  /  TBili  0.7  /  DBili  x   /  AST  19  /  ALT  16  /  AlkPhos  58  05-30    LIVER FUNCTIONS - ( 30 May 2022 04:47 )  Alb: 3.1 g/dL / Pro: 6.6 g/dL / ALK PHOS: 58 U/L / ALT: 16 U/L / AST: 19 U/L / GGT: x             ABG - ( 29 May 2022 05:00 )  pH, Arterial: 7.43  pH, Blood: x     /  pCO2: 33    /  pO2: 120   / HCO3: 22    / Base Excess: -1.7  /  SaO2: 98.9      Urinalysis Basic - ( 28 May 2022 18:58 )    Color: Yellow / Appearance: Clear / S.025 / pH: x  Gluc: x / Ketone: Negative  / Bili: Negative / Urobili: 2 mg/dL   Blood: x / Protein: 30 mg/dL / Nitrite: Negative   Leuk Esterase: Negative / RBC: 16 /hpf / WBC 2 /HPF   Sq Epi: x / Non Sq Epi: 0 /hpf / Bacteria: 0.0    ======================Micro/Rad/Cardio=================  Culture: Reviewed   CXR: Reviewed  Echo:Reviewed  ======================================================  PAST MEDICAL & SURGICAL HISTORY:  AV block  Essential hypertension  Chronic HFrEF (heart failure with reduced ejection fraction)  Chronic kidney disease, unspecified CKD stage  CAD (coronary artery disease)  HLD (hyperlipidemia)  Type 2 diabetes mellitus  Artificial cardiac pacemaker    Assessment/Plan:  65 y/o male w/ PMHx HTN, HLD, type 2 DM, chronic HFrEF (EF 25-30% by Echo), complete heart block s/p PPM (in , upgraded to BiV-ICD in 2016), CAD (s/p recent BERNABE mid LAD at [facility] on 2022), and stage II CKD (baseline Cr ~1.3) presented with near syncope to OSH found to have 41 episodes of VT on device, s/p unsuccessful VT ablation and transferred to [facility] for management of cardiogenic shock and advanced therapy eval.     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  - SPO2 94-95% on 2L NC    Cardiovascular  # Cardiogenic shock   - TTE : LV 5.2 cm, LVEF 10-15%, LVOT VTI 10 cm, moderate RV dysfunction, mild AI, minimal MR  - [facility] : patent mLAD stent with slow flow, D1 with 40-50% stenosis  - [facility] : RA 12, PA 70/40 (50), PCWP 22, Milana CO/CI 2.3/1.3, MAP 83 with SVR 2469, PVR 12 PERSAUD  - : RA 10, PA 76/35 (49), PCWP 34, PA sat 57% with Milana CO/CI 3.1/1.6, MAP 71 with SVR 1574, PVR 4.8  - Maintain Milrinone .5mcg/kg/min   - monitor strict IOs and daily wts   - Undergoing VAD/OHT workup, Plan for CT colo tomorrow- prep today, US elasto pending  - low threshold for escalation to IABP if hemodynamics worsen     # VTach   - s/p EPS on , unable to perform ablation as no substrate found and did not induce VT because of severely low EF   - Interrogation of ICD @Dr Wilson's office : back-to-back episodes of VT @ 200+ bpm all terminating with ATP. Since last cath pt has had 41 VT episodes (all falling into VF zone)  - Normal device function. BIV pacing 97%. No programming changes made  -. c/w Amiodarone 400 mg PO BID load, monitor LFTs   - BB held due to cardiogenic shock  - undergoing OHT/LVAD eval as above   - no further VT on inotropes     # CAD   - Chest pain free and compliant with DAPT since last PCI 22  - Cardiac cath @[facility] 22: mLAD 90% s/p BERNABE, LCx mild disease, RCA mild disease s/p diagnostic cardiac cath w/ Dr Wagner on 2022   - Cont ASA and Lipitor   - per discussion with interventional at OSH, ok to DC plavix at this time     GI  - No active issues  - DASH diet w/ 1500ml Fluid restriction  - Cont. PPI and bowel regimen, last BM   - Plan for CT colo tomorrow/ prep today    Renal  # JAGRUTI on CKD II  Admitted w/ Cr 2.01 (baseline Cr ~1.3) now trending down   - Avoid nephrotoxic agents, NSAIDs. Renally dose meds.  - monitor strict IOs and continue current cardiac support     Heme/Onc  - No active issues  - HSQ for DVT ppx     Endo   # T2DM (A1C 6.2 on admission)  - Cont. ISS, glucose controlled, monitor FS closely now that pt is on steroids     MSK  # Gout flare, right knee   - continue renally dosed colchicine and allopurinol   - Day #3 of 5, prednisone 40mg     ID  # Febrile , cordis and swan discontinued  - infectious workup sent including RVP (negative), UA(negative), and BCx x2 (prelim NGTD)  - vanco x1 given  - trend fever and wbc    Lines  HIREN Bonilla (OSH )

## 2022-05-30 NOTE — PROGRESS NOTE ADULT - ASSESSMENT
67 YO M with a history of ACC/AHA Stage C->D mixed NICM/ICM (likely familial with strong FH and early arrhythmia history in his 30's) with LVED 5.2 cm and LVEF 10-15% s/p PPM upgraded to CRT-D, CAD s/p PCI to mLAD 4/2022, well controlled DM2 (A1c 6.2%), and CKD III (Cr 1.4) who initially presented to [facility] 5/20 with near syncope in setting of worsening HF symptoms and found to have 41 episodes of VT, many terminating with ATP. LHC did not reveal new obstructive CAD and her underwent EPS which did not reveal endocardial substrate amenable for ablation. RHC revealed severely depressed cardiac output and he was transferred to [facility] 5/26 for advanced therapies evaluation.    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 He has improved significantly with sequential uptitration of inotropes and increased pacing rate. He is INTERMACS 3 and SCAI stage B. He will likely need advanced therapies this admission. He has significant pulmonary hypertension but PVR is reversible with nipride and suspect would improve with LV unloading given severely elevated PCWP despite euvolemia and low diastolic pulmonary gradient.        Review of studies  TTE 5/21: LV 5.2 cm, LVEF 10-15%, LVOT VTI 10 cm, moderate RV dysfunction, mild AI, minimal MR  EKG: a-BiV paced  [facility] 5/23: patent mLAD stent with slow flow, D1 with 40-50% stenosis, mild disease otherwise  [facility] 5/26: RA 12, PA 70/40 (50), PCWP 22, Milana CO/CI 2.3/1.3, MAP 83 with SVR 2469, PVR 12 PERSAUD    Hemodynamics  5/27 (milrinone 0.25): RA 10, PA 76/35 (49), PCWP 34, PA sat 57% with Milana CO/CI 3.1/1.6, MAP 71 with SVR 1574, PVR 4.8  5/28 (on milrinone 0.375): RA 7, PA 63/31, PCWP 26, PA sat 72.8% with Milana CO/CI 4.9/2.5 (CI later dropped to 1.6 in the afternoon), MAP 70 with SVR 1039, PVR 2.9  5/29 (on milrinone 0.5): RA 8, PA 75/32 (50), PCWP 24, PA sat 74% with Milana CO/CI 5.0/2.6, MAP 77 with SVR 1200, PVR 5.2  5/29 (on milrinone 0.5 and nipride to 3 mcg/kg/min): RA 6, PA 59/31 (40), PCWP 30, PA sat 79% with Milana CO/CI 7.0/3.6, BP 97/57 (MAP 70) with , PVR 1.4   65 YO M with a history of ACC/AHA Stage C->D mixed NICM/ICM (likely familial with strong FH and early arrhythmia history in his 30's) with LVED 5.2 cm and LVEF 10-15% s/p PPM upgraded to CRT-D, CAD s/p PCI to mLAD 4/2022, well controlled DM2 (A1c 6.2%), and CKD III (Cr 1.4) who initially presented to [facility] 5/20 with near syncope in setting of worsening HF symptoms and found to have 41 episodes of VT, many terminating with ATP. LHC did not reveal new obstructive CAD and her underwent EPS which did not reveal endocardial substrate amenable for ablation. RHC revealed severely depressed cardiac output and he was transferred to [facility] 5/26 for advanced therapies evaluation.    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 He has improved significantly with sequential uptitration of inotropes and increased pacing rate. He is INTERMACS 3 and SCAI stage B. He will likely need advanced therapies this admission. He has significant pulmonary hypertension but PVR is reversible with nipride and suspect would improve with LV unloading given severely elevated PCWP despite euvolemia and low diastolic pulmonary gradient.      Review of studies  TTE 5/21: LV 5.2 cm, LVEF 10-15%, LVOT VTI 10 cm, moderate RV dysfunction, mild AI, minimal MR  EKG: a-BiV paced  [facility] 5/23: patent mLAD stent with slow flow, D1 with 40-50% stenosis, mild disease otherwise  [facility] 5/26: RA 12, PA 70/40 (50), PCWP 22, Milana CO/CI 2.3/1.3, MAP 83 with SVR 2469, PVR 12 PERSAUD    Hemodynamics  5/27 (milrinone 0.25): RA 10, PA 76/35 (49), PCWP 34, PA sat 57% with Milana CO/CI 3.1/1.6, MAP 71 with SVR 1574, PVR 4.8  5/28 (on milrinone 0.375): RA 7, PA 63/31, PCWP 26, PA sat 72.8% with Milana CO/CI 4.9/2.5 (CI later dropped to 1.6 in the afternoon), MAP 70 with SVR 1039, PVR 2.9  5/29 (on milrinone 0.5): RA 8, PA 75/32 (50), PCWP 28, PA sat 74% with Milana CO/CI 5.0/2.6, MAP 77 with SVR 1200, PVR 4.4  5/29 (on milrinone 0.5 and nipride to 3 mcg/kg/min): RA 6, PA 59/31 (40), PCWP 30, PA sat 79% with Milana CO/CI 7.0/3.6, BP 97/57 (MAP 70) with , PVR 1.4

## 2022-05-30 NOTE — PROGRESS NOTE ADULT - PROBLEM SELECTOR PLAN 5
- hx of VT s/p unsuccessful VT ablation  - remains on amio 400 mg PO BID  - since being admitted to [facility] has not had any episodes of VT, currently tolerating milrinone. - hx of VT s/p unsuccessful VT ablation  - remains on amio 400 mg PO BID  - since being admitted to [facility] has not had any episodes of VT, currently tolerating high dose milrinone.

## 2022-05-30 NOTE — PROGRESS NOTE ADULT - PROBLEM SELECTOR PLAN 7
- 100.6 fever overnight, no further fever  - F/u with BCx and UCx  - Differential: gout flare as well  - Was given empiric vanc x 1  - Remove PA catheter - 100.6 fever on 5/28, cultures NGTD  - removed PAC

## 2022-05-30 NOTE — PROGRESS NOTE ADULT - PROBLEM SELECTOR PLAN 2
- BB on hold while in cardiogenic shock on milrinone  - ACE/ARB/ARNI/MRA on hold with JAGRUTI  - euvolemic off diuretics, goal CVP 8

## 2022-05-31 DIAGNOSIS — Z71.89 OTHER SPECIFIED COUNSELING: ICD-10-CM

## 2022-05-31 DIAGNOSIS — Z51.5 ENCOUNTER FOR PALLIATIVE CARE: ICD-10-CM

## 2022-05-31 LAB
% ALBUMIN: 51.3 % — SIGNIFICANT CHANGE UP
% ALPHA 1: 5.8 % — SIGNIFICANT CHANGE UP
% ALPHA 2: 11.3 % — SIGNIFICANT CHANGE UP
% BETA: 14.6 % — SIGNIFICANT CHANGE UP
% GAMMA: 17 % — SIGNIFICANT CHANGE UP
ALBUMIN SERPL ELPH-MCNC: 3.3 G/DL — LOW (ref 3.6–5.5)
ALBUMIN SERPL ELPH-MCNC: 3.3 G/DL — SIGNIFICANT CHANGE UP (ref 3.3–5)
ALBUMIN/GLOB SERPL ELPH: 1 RATIO — SIGNIFICANT CHANGE UP
ALP SERPL-CCNC: 60 U/L — SIGNIFICANT CHANGE UP (ref 40–120)
ALPHA1 GLOB SERPL ELPH-MCNC: 0.4 G/DL — SIGNIFICANT CHANGE UP (ref 0.1–0.4)
ALPHA2 GLOB SERPL ELPH-MCNC: 0.7 G/DL — SIGNIFICANT CHANGE UP (ref 0.5–1)
ALT FLD-CCNC: 28 U/L — SIGNIFICANT CHANGE UP (ref 10–45)
ANION GAP SERPL CALC-SCNC: 11 MMOL/L — SIGNIFICANT CHANGE UP (ref 5–17)
ANION GAP SERPL CALC-SCNC: 12 MMOL/L — SIGNIFICANT CHANGE UP (ref 5–17)
AST SERPL-CCNC: 34 U/L — SIGNIFICANT CHANGE UP (ref 10–40)
B-GLOBULIN SERPL ELPH-MCNC: 0.9 G/DL — SIGNIFICANT CHANGE UP (ref 0.5–1)
BASOPHILS # BLD AUTO: 0.01 K/UL — SIGNIFICANT CHANGE UP (ref 0–0.2)
BASOPHILS NFR BLD AUTO: 0.1 % — SIGNIFICANT CHANGE UP (ref 0–2)
BILIRUB SERPL-MCNC: 1 MG/DL — SIGNIFICANT CHANGE UP (ref 0.2–1.2)
BUN SERPL-MCNC: 21 MG/DL — SIGNIFICANT CHANGE UP (ref 7–23)
BUN SERPL-MCNC: 22 MG/DL — SIGNIFICANT CHANGE UP (ref 7–23)
CALCIUM SERPL-MCNC: 9.3 MG/DL — SIGNIFICANT CHANGE UP (ref 8.4–10.5)
CALCIUM SERPL-MCNC: 9.6 MG/DL — SIGNIFICANT CHANGE UP (ref 8.4–10.5)
CHLORIDE SERPL-SCNC: 101 MMOL/L — SIGNIFICANT CHANGE UP (ref 96–108)
CHLORIDE SERPL-SCNC: 101 MMOL/L — SIGNIFICANT CHANGE UP (ref 96–108)
CO2 SERPL-SCNC: 22 MMOL/L — SIGNIFICANT CHANGE UP (ref 22–31)
CO2 SERPL-SCNC: 22 MMOL/L — SIGNIFICANT CHANGE UP (ref 22–31)
CREAT SERPL-MCNC: 1.12 MG/DL — SIGNIFICANT CHANGE UP (ref 0.5–1.3)
CREAT SERPL-MCNC: 1.16 MG/DL — SIGNIFICANT CHANGE UP (ref 0.5–1.3)
EGFR: 70 ML/MIN/1.73M2 — SIGNIFICANT CHANGE UP
EGFR: 73 ML/MIN/1.73M2 — SIGNIFICANT CHANGE UP
EOSINOPHIL # BLD AUTO: 0 K/UL — SIGNIFICANT CHANGE UP (ref 0–0.5)
EOSINOPHIL NFR BLD AUTO: 0 % — SIGNIFICANT CHANGE UP (ref 0–6)
GAMMA GLOBULIN: 1.1 G/DL — SIGNIFICANT CHANGE UP (ref 0.6–1.6)
GAMMA INTERFERON BACKGROUND BLD IA-ACNC: 0.03 IU/ML — SIGNIFICANT CHANGE UP
GLUCOSE BLDC GLUCOMTR-MCNC: 114 MG/DL — HIGH (ref 70–99)
GLUCOSE BLDC GLUCOMTR-MCNC: 131 MG/DL — HIGH (ref 70–99)
GLUCOSE BLDC GLUCOMTR-MCNC: 136 MG/DL — HIGH (ref 70–99)
GLUCOSE BLDC GLUCOMTR-MCNC: 137 MG/DL — HIGH (ref 70–99)
GLUCOSE SERPL-MCNC: 113 MG/DL — HIGH (ref 70–99)
GLUCOSE SERPL-MCNC: 224 MG/DL — HIGH (ref 70–99)
HBV DNA # SERPL NAA+PROBE: SIGNIFICANT CHANGE UP
HBV DNA SERPL NAA+PROBE-LOG#: SIGNIFICANT CHANGE UP LOGIU/ML
HCT VFR BLD CALC: 46.3 % — SIGNIFICANT CHANGE UP (ref 39–50)
HGB BLD-MCNC: 15.4 G/DL — SIGNIFICANT CHANGE UP (ref 13–17)
IMM GRANULOCYTES NFR BLD AUTO: 0.4 % — SIGNIFICANT CHANGE UP (ref 0–1.5)
INTERPRETATION SERPL IFE-IMP: SIGNIFICANT CHANGE UP
LACTATE SERPL-SCNC: 1.4 MMOL/L — SIGNIFICANT CHANGE UP (ref 0.7–2)
LYMPHOCYTES # BLD AUTO: 2.12 K/UL — SIGNIFICANT CHANGE UP (ref 1–3.3)
LYMPHOCYTES # BLD AUTO: 20.7 % — SIGNIFICANT CHANGE UP (ref 13–44)
M TB IFN-G BLD-IMP: NEGATIVE — SIGNIFICANT CHANGE UP
M TB IFN-G CD4+ BCKGRND COR BLD-ACNC: -0.02 IU/ML — SIGNIFICANT CHANGE UP
M TB IFN-G CD4+CD8+ BCKGRND COR BLD-ACNC: -0.02 IU/ML — SIGNIFICANT CHANGE UP
MAGNESIUM SERPL-MCNC: 2.4 MG/DL — SIGNIFICANT CHANGE UP (ref 1.6–2.6)
MCHC RBC-ENTMCNC: 30.1 PG — SIGNIFICANT CHANGE UP (ref 27–34)
MCHC RBC-ENTMCNC: 33.3 GM/DL — SIGNIFICANT CHANGE UP (ref 32–36)
MCV RBC AUTO: 90.4 FL — SIGNIFICANT CHANGE UP (ref 80–100)
MONOCYTES # BLD AUTO: 0.69 K/UL — SIGNIFICANT CHANGE UP (ref 0–0.9)
MONOCYTES NFR BLD AUTO: 6.7 % — SIGNIFICANT CHANGE UP (ref 2–14)
NEUTROPHILS # BLD AUTO: 7.39 K/UL — SIGNIFICANT CHANGE UP (ref 1.8–7.4)
NEUTROPHILS NFR BLD AUTO: 72.1 % — SIGNIFICANT CHANGE UP (ref 43–77)
NRBC # BLD: 0 /100 WBCS — SIGNIFICANT CHANGE UP (ref 0–0)
PHOSPHATE SERPL-MCNC: 2.9 MG/DL — SIGNIFICANT CHANGE UP (ref 2.5–4.5)
PLATELET # BLD AUTO: 272 K/UL — SIGNIFICANT CHANGE UP (ref 150–400)
POTASSIUM SERPL-MCNC: 5 MMOL/L — SIGNIFICANT CHANGE UP (ref 3.5–5.3)
POTASSIUM SERPL-MCNC: 5.4 MMOL/L — HIGH (ref 3.5–5.3)
POTASSIUM SERPL-SCNC: 5 MMOL/L — SIGNIFICANT CHANGE UP (ref 3.5–5.3)
POTASSIUM SERPL-SCNC: 5.4 MMOL/L — HIGH (ref 3.5–5.3)
PROT PATTERN SERPL ELPH-IMP: SIGNIFICANT CHANGE UP
PROT SERPL-MCNC: 6.5 G/DL — SIGNIFICANT CHANGE UP (ref 6–8.3)
PROT SERPL-MCNC: 7.3 G/DL — SIGNIFICANT CHANGE UP (ref 6–8.3)
QUANT TB PLUS MITOGEN MINUS NIL: 0.91 IU/ML — SIGNIFICANT CHANGE UP
RBC # BLD: 5.12 M/UL — SIGNIFICANT CHANGE UP (ref 4.2–5.8)
RBC # FLD: 15.3 % — HIGH (ref 10.3–14.5)
SODIUM SERPL-SCNC: 134 MMOL/L — LOW (ref 135–145)
SODIUM SERPL-SCNC: 135 MMOL/L — SIGNIFICANT CHANGE UP (ref 135–145)
STRONGYLOIDES AB SER-ACNC: NEGATIVE — SIGNIFICANT CHANGE UP
WBC # BLD: 10.25 K/UL — SIGNIFICANT CHANGE UP (ref 3.8–10.5)
WBC # FLD AUTO: 10.25 K/UL — SIGNIFICANT CHANGE UP (ref 3.8–10.5)

## 2022-05-31 PROCEDURE — 99233 SBSQ HOSP IP/OBS HIGH 50: CPT

## 2022-05-31 PROCEDURE — 99291 CRITICAL CARE FIRST HOUR: CPT

## 2022-05-31 PROCEDURE — 93923 UPR/LXTR ART STDY 3+ LVLS: CPT | Mod: 26

## 2022-05-31 PROCEDURE — 99223 1ST HOSP IP/OBS HIGH 75: CPT

## 2022-05-31 PROCEDURE — 93010 ELECTROCARDIOGRAM REPORT: CPT

## 2022-05-31 PROCEDURE — 90791 PSYCH DIAGNOSTIC EVALUATION: CPT

## 2022-05-31 PROCEDURE — 74263 CT COLONOGRAPHY SCREENING: CPT | Mod: 26,59

## 2022-05-31 PROCEDURE — 93925 LOWER EXTREMITY STUDY: CPT | Mod: 26

## 2022-05-31 PROCEDURE — 99497 ADVNCD CARE PLAN 30 MIN: CPT | Mod: 25

## 2022-05-31 PROCEDURE — 93880 EXTRACRANIAL BILAT STUDY: CPT | Mod: 26

## 2022-05-31 RX ADMIN — Medication 25 MILLIGRAM(S): at 05:23

## 2022-05-31 RX ADMIN — HEPARIN SODIUM 5000 UNIT(S): 5000 INJECTION INTRAVENOUS; SUBCUTANEOUS at 05:23

## 2022-05-31 RX ADMIN — MILRINONE LACTATE 11 MICROGRAM(S)/KG/MIN: 1 INJECTION, SOLUTION INTRAVENOUS at 05:23

## 2022-05-31 RX ADMIN — Medication 81 MILLIGRAM(S): at 13:12

## 2022-05-31 RX ADMIN — AMIODARONE HYDROCHLORIDE 400 MILLIGRAM(S): 400 TABLET ORAL at 05:23

## 2022-05-31 RX ADMIN — CHLORHEXIDINE GLUCONATE 1 APPLICATION(S): 213 SOLUTION TOPICAL at 05:28

## 2022-05-31 RX ADMIN — Medication 25 MILLIGRAM(S): at 13:12

## 2022-05-31 RX ADMIN — AMIODARONE HYDROCHLORIDE 400 MILLIGRAM(S): 400 TABLET ORAL at 17:43

## 2022-05-31 RX ADMIN — HEPARIN SODIUM 5000 UNIT(S): 5000 INJECTION INTRAVENOUS; SUBCUTANEOUS at 21:27

## 2022-05-31 RX ADMIN — HEPARIN SODIUM 5000 UNIT(S): 5000 INJECTION INTRAVENOUS; SUBCUTANEOUS at 13:12

## 2022-05-31 RX ADMIN — PANTOPRAZOLE SODIUM 40 MILLIGRAM(S): 20 TABLET, DELAYED RELEASE ORAL at 06:58

## 2022-05-31 RX ADMIN — Medication 10 MILLIGRAM(S): at 08:00

## 2022-05-31 RX ADMIN — Medication 40 MILLIGRAM(S): at 05:23

## 2022-05-31 RX ADMIN — Medication 75 MILLIGRAM(S): at 13:12

## 2022-05-31 RX ADMIN — Medication 25 MILLIGRAM(S): at 22:05

## 2022-05-31 RX ADMIN — ATORVASTATIN CALCIUM 80 MILLIGRAM(S): 80 TABLET, FILM COATED ORAL at 21:27

## 2022-05-31 RX ADMIN — MILRINONE LACTATE 11 MICROGRAM(S)/KG/MIN: 1 INJECTION, SOLUTION INTRAVENOUS at 13:20

## 2022-05-31 NOTE — PROGRESS NOTE ADULT - ASSESSMENT
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 5/20 with near syncope in setting of worsening HF symptoms and found to have 41 episodes of VT, many terminating with ATP. LHC did not reveal new obstructive CAD and her underwent EPS which did not reveal endocardial substrate amenable for ablation. RHC revealed severely depressed cardiac output and he was transferred to [facility] 5/26 for advanced therapies evaluation.    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 He has improved significantly with sequential uptitration of inotropes and increased pacing rate. He is INTERMACS 3 and SCAI stage B. He will likely need advanced therapies this admission. He has significant pulmonary hypertension but PVR is reversible with nipride and suspect would improve with LV unloading given severely elevated PCWP despite euvolemia and low diastolic pulmonary gradient. Will be presented to the selection committee for advance therapies on Thursday.     Review of studies  TTE 5/21: LV 5.2 cm, LVEF 10-15%, LVOT VTI 10 cm, moderate RV dysfunction, mild AI, minimal MR  EKG: a-BiV paced  [facility] 5/23: patent mLAD stent with slow flow, D1 with 40-50% stenosis, mild disease otherwise  [facility] 5/26: RA 12, PA 70/40 (50), PCWP 22, Milana CO/CI 2.3/1.3, MAP 83 with SVR 2469, PVR 12 PERSAUD    Hemodynamics  5/27 (milrinone 0.25): RA 10, PA 76/35 (49), PCWP 34, PA sat 57% with Milana CO/CI 3.1/1.6, MAP 71 with SVR 1574, PVR 4.8  5/28 (on milrinone 0.375): RA 7, PA 63/31, PCWP 26, PA sat 72.8% with Milana CO/CI 4.9/2.5 (CI later dropped to 1.6 in the afternoon), MAP 70 with SVR 1039, PVR 2.9  5/29 (on milrinone 0.5): RA 8, PA 75/32 (50), PCWP 28, PA sat 74% with Milana CO/CI 5.0/2.6, MAP 77 with SVR 1200, PVR 4.4  5/29 (on milrinone 0.5 and nipride to 3 mcg/kg/min): RA 6, PA 59/31 (40), PCWP 30, PA sat 79% with Milana CO/CI 7.0/3.6, BP 97/57 (MAP 70) with , PVR 1.4

## 2022-05-31 NOTE — PROGRESS NOTE ADULT - PROBLEM SELECTOR PLAN 5
- hx of VT s/p unsuccessful VT ablation  - remains on amio 400 mg PO BID  - since being admitted to [facility] has not had any episodes of VT, currently tolerating high dose milrinone.

## 2022-05-31 NOTE — DIETITIAN INITIAL EVALUATION ADULT - NS FNS DIET ORDER
Diet, Consistent Carbohydrate/No Snacks:   DASH/TLC {Sodium & Cholesterol Restricted} (DASH)  1500mL Fluid Restriction ([id number]) (05-31-22 @ 10:57)   Diet, Consistent Carbohydrate/No Snacks:   DASH/TLC {Sodium & Cholesterol Restricted} (DASH)  1500mL Fluid Restriction ([id number]) (05-31-22 @ 10:57)

## 2022-05-31 NOTE — CONSULT NOTE ADULT - SUBJECTIVE AND OBJECTIVE BOX
Behavioral Cardiology Psychological Assessment    History of present illness: Mr. Du is a 66-year-old male, PMH of HTN, HLD, type 2 DM, chronic HFrEF (25-30%), complete heart block s/p PPM, BiV-ICD, CAD (s/p recent BERNABE mid LAD at [facility] on 2022), and stage II CKD, admitted for intermittent vtach, now s/p unsuccessful VT ablation. Patient transferred from [facility] to [facility] for further management and evaluation for LVAD vs. OHT.     Social history: Patient lives with his wife (Teresa, 66 y/o, [phone]) and 24 y/o daughter (Priyanka) in [place], [place], in private home. Patient has two other adult sons (Philip, age 35; and Aldo, age 30) who live in [place] and [place]. Patient’s mother  in her 80s of natural causes and father  in his 80s from a heart attack. Patient has four siblings who live in the area and who he is close to. Patient is a citizen, moved [place] the [place] from [facility] approximately 40 years ago. 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 Education: high school.    Substance use:   Tobacco: Former smoker, approximately 1-2 cigarettes/day from age 21 until 2006 at time of his heart block. No use since that time.    Alcohol: Denies current alcohol use. Last time he used alcohol was prior to 2021. Before that time, he used to drink approximately 4 beers at a time, 2-3 times/week.   Drug: Denies current or past substance use.    Understanding of health history: Patient does not remember exactly when he was diagnosed with heart failure, stated it was approximately 5-10 years ago, after his pacemaker. Patient understands his "heart is weak", is aware of LVAD/transplant evaluation process first discussed during current hospitalization. Patient was able to provide teach back education on some LVAD management guidelines (water restrictions, battery management), but not others (coumadin), and could not provide teach back on risks and benefits. Importantly, he had not understood driveline connection. Patient stated he had not yet met with heart transplant coordinator, and therefore could not provide teach back. However, he had read brochures provided and was aware of immunosuppressant medications and need for close follow up with team.    Adherence to heart failure guidelines:   Medication: Uses pillbox, takes medications at the same time every day and is also reminded by his wife. Reported he does not forget to take medications.   Low Na diet: Patient’s wife prepares all meals consistent with both low sodium and diabetic diet guidelines. Patient aware of need for low sodium diet, however not aware of maximum sodium intake guideline (states wife is aware). Reports no meals outside the home/takeout.  Daily fluid intake: Aware of need to limit liquid intake, states he has a sign on the fridge with daily maximum (does not remember what it is), and premeasures fluid daily in the morning accordingly.  Daily weight checks: Patient states he has not been told by MD about need to check daily weight and is not currently monitoring.    Symptom management: Aware of need to call MD for dizziness, states he does not usually call providers for SOB as it usually resolves, does not call providers for feet/legs swelling as he attributes this to gout.  Physical activity: Reports walking 4 blocks/day when weather is not too cold.  MD follow up: Reports good follow up with MD.    Support system: Identified his wife, Teresa, as primary support person, she helps with cooking, shopping, ADLs as needed. Teresa is a retired nurse and comfortable assisting patient with dressing changes, and all ADLs including bathing, toileting, dressing. She also drives him to medical appointments. Additionally, patient reported friends in the area could assist driving him to appointments if needed.    Past psychiatric history: Denied.      Psychological assessment: Patient endorsed minimal symptoms of anxiety and depression over the past two weeks. States he felt down after realizing his health was ‘worse than he thought,’ and coped by calling wife on the phone, as well as praying. He also endorsed some days of poor sleep, however, states he usually gets 6 hours of sleep including naps during the day. Patient also reported some instances of anxious mood when told ‘bad news,’ experienced as pressure in his chest. He states however no difficulty disengaging from anxiety when it happens (DOROTA-7=1, PHQ-9=2). Denied current SI/HI. Patient stated hearing about LVAD/HT was "not good news" and he is "putting off figuring out how he feels" until he is told whether he is a candidate. States that at that time, he will talk to his wife to get a ‘better understanding of what they both want and feel.’ Coping strategies include watching TV, reading, talking to his wife, and praying. Patient is interested in  service and future visits by writer.       Mental Status Exam: Seen sitting up in chair in CICU. Alert, oriented x4. Appropriate eye contact. Speech normal volume and rate. Thought process was logical and goal-oriented, content appropriate to conversation. No evidence of jack, delusions, psychosis. Denies SI/HI. Mood is euthymic. Affect is full range. Insight into disease is good, immediate judgment is adequate.     Cognitive screen: Patient performed below cut off (Score 22/30) on the MOCA, with deficits observed in memory and language. Of note, delayed recall improved when using auditory cues. Score must be interpreted in context of limited schooling, as well as interruption during test by  before delayed recall could be elicited. Additionally, patient reported poor sleep previous night due to preparing for colonoscopy.       Impression: Mr. Du is a 66-year-old male with advanced heart failure who was transferred from [facility] to [facility] for further management and evaluation for LVAD vs. OHT. , lives with his family in [place], [place], reports adequate support system. No alcohol, tobacco, drug use. No barriers identified in terms of psychological adjustment or adherence. He would benefit from additional memory assessment, as well as additional education on heart failure guidelines and LVAD/HT. Stated he wants to wait until he knows if he is a candidate for advanced therapies before deciding if he wants it. At that time will talk it over with his wife so they can both decide.     Dx: Adjustment disorder, unspecified. F43.20. Systolic heart failure, I50.2.    Recommendations:   Behavioral Cardiology will follow as needed.    service  Would benefit from repeat memory assessment  Needs additional education on heart failure guidelines, as well as LVAD and HT  Would benefit from involving family members in communication regarding medical status and recommendations  Patient would benefit from continued involvement from behavioral cardiology    UNOS:   Education: High School  Citizen: Yes  : No    65 minutes spent on patient encounter    Camille Early, MPH, MA  Psychology Extern     Ondina Wooten, PhD  Supervising Psychologist

## 2022-05-31 NOTE — PROGRESS NOTE ADULT - SUBJECTIVE AND OBJECTIVE BOX
Subjective: Patient seen and examined resting in bed. Undergoing virtual colonoscopy today     Medications:  allopurinol 75 milliGRAM(s) Oral daily  aMIOdarone    Tablet 400 milliGRAM(s) Oral two times a day  aspirin enteric coated 81 milliGRAM(s) Oral daily  atorvastatin 80 milliGRAM(s) Oral at bedtime  chlorhexidine 4% Liquid 1 Application(s) Topical <User Schedule>  heparin   Injectable 5000 Unit(s) SubCutaneous every 8 hours  hydrALAZINE 25 milliGRAM(s) Oral every 8 hours  influenza   Vaccine 0.5 milliLiter(s) IntraMuscular once  insulin lispro (ADMELOG) corrective regimen sliding scale   SubCutaneous three times a day before meals  insulin lispro (ADMELOG) corrective regimen sliding scale   SubCutaneous at bedtime  lidocaine   4% Patch 1 Patch Transdermal daily  milrinone Infusion 0.5 MICROgram(s)/kG/Min IV Continuous <Continuous>  pantoprazole    Tablet 40 milliGRAM(s) Oral before breakfast  polyethylene glycol 3350 17 Gram(s) Oral two times a day  predniSONE   Tablet 40 milliGRAM(s) Oral daily  senna 2 Tablet(s) Oral at bedtime      ICU Vital Signs Last 24 Hrs  T(C): 36.5 (31 May 2022 12:00), Max: 36.8 (30 May 2022 23:00)  T(F): 97.7 (31 May 2022 12:00), Max: 98.2 (30 May 2022 23:00)  HR: 101 (31 May 2022 16:00) (79 - 101)  BP: 105/71 (31 May 2022 16:00) (94/58 - 119/73)  BP(mean): 84 (31 May 2022 16:00) (71 - 92)  ABP: --  ABP(mean): --  RR: 20 (31 May 2022 16:00) (13 - 29)  SpO2: 98% (31 May 2022 16:00) (91% - 99%)    Weight in k.4 ( @ 04:00)    I&O's Summary    30 May 2022 07:01  -  31 May 2022 07:00  --------------------------------------------------------  IN: 2301 mL / OUT: 1925 mL / NET: 376 mL    31 May 2022 07:01  -  31 May 2022 16:42  --------------------------------------------------------  IN: 579 mL / OUT: 450 mL / NET: 129 mL        Physical Exam  General: No distress. Comfortable.  Neck: Neck supple. JVP not elevated  Chest: Clear to auscultation bilaterally  CV: Normal S1 and S2. No murmurs, rub, or gallops. Radial pulses normal.  Abdomen: Soft, non-distended, non-tender  Neurology: Alert and oriented times three.     Labs:                        15.4   10.25 )-----------( 272      ( 31 May 2022 05:00 )             46.3         135  |  101  |  21  ----------------------------<  224<H>  5.0   |  22  |  1.16    Ca    9.3      31 May 2022 14:44  Phos  2.9       Mg     2.4         TPro  7.3  /  Alb  3.3  /  TBili  1.0  /  DBili  x   /  AST  34  /  ALT  28  /  AlkPhos  60            Serum Pro-Brain Natriuretic Peptide: 3102 pg/mL ( @ 15:23)  Serum Pro-Brain Natriuretic Peptide: 4206 pg/mL ( @ 20:22)  Serum Pro-Brain Natriuretic Peptide: 2846 pg/mL ( @ 17:22)    Oxygen Saturation, Mixed: 79.2 ( @ 10:51)  Oxygen Saturation, Mixed: 73.8 ( @ 04:45)  Oxygen Saturation, Mixed: 78.2 ( @ 22:29)  Oxygen Saturation, Mixed: 57.7 ( @ 19:30)      Lactate, Blood: 1.4 mmol/L ( @ 05:00)  Lactate, Blood: 1.0 mmol/L ( @ 04:47)

## 2022-05-31 NOTE — DIETITIAN INITIAL EVALUATION ADULT - ORAL INTAKE PTA/DIET HISTORY
Pt with good PO intake and appetite PTA.   Diet Recall  - Breakfast: Scrambled eggs, whole wheat toast, some fruits  - Lunch (usually skips): chicken, vegetables, and rice  - Dinner: chicken (sometimes fish), vegetables, and rice  - Snack: Fruits and nuts  - Beverages: water  - Alcohol: None

## 2022-05-31 NOTE — CONSULT NOTE ADULT - CONVERSATION DETAILS
Discussed with patient in person regarding his overall goals and wishes.  At this point, goals are "full steam ahead" with objective to get better, even if that means LVAD or heart transplant.  We had an independent discussion about his medical condition, with his answers embedded above, and patient cognizant that his condition is very serious with potential for a variety of outcomes.    We reviewed what his wishes would be in the event he got more sick, with or without LVAD/heart transplant.  Patient states he would never want to live dependent on machines or artificial nutrition to stay alive. He feels his wife knows this but acknowledges that they never had a designated conversation about this.    We reviewed HCP and patient named his wife as primary and his son Philip as alternate. original provided to patient and copy placed in chart.

## 2022-05-31 NOTE — CONSULT NOTE ADULT - SUBJECTIVE AND OBJECTIVE BOX
HPI:  66M Mixed Ischemic/NICM Cardiomyopathy (EF 25-30% at baseline, s/p BIV-ICD), CAD (medically managed MIs in 2008,2011, Most recent stent in April 2022 to mLAD) presented with multiple near syncope, found to have 41 episodes of VT and admitted initially to cardiac telemetry for further evaluation/management. Ischemic evaluation without new disease and VT ablation without substrate to complete ablation.   Transferred from [facility] for further management and evaluation for LVAD vs OHT.    (26 May 2022 20:31)      What has the CHF team told them:    What has the CHF team told them about LVAD:    What is their understanding of LVAD:    What are their thoughts of living with an LVAD, and their understanding of how it may affect ADLs:    What is their understanding of heart transplant:     What is their social situation: Employed [] Retired [] Who resides at home:    Who will care for patient should he or she have an LVAD:       PERTINENT PM/SXH:   AV block    Essential hypertension    Pure hypercholesterolemia    Myocardial infarct, old    Chronic HFrEF (heart failure with reduced ejection fraction)    Chronic kidney disease, unspecified CKD stage    CAD (coronary artery disease)    HLD (hyperlipidemia)    Type 2 diabetes mellitus      Artificial cardiac pacemaker      FAMILY HISTORY:  Family history of acute myocardial infarction (Father)  72      ITEMS NOT CHECKED ARE NOT PRESENT    SOCIAL HISTORY:   Significant other/partner[ ]  Children[ ]  Uatsdin/Spirituality:  Substance hx:  [ ]   Tobacco hx:  [ ]   Alcohol hx: [ ]   Home Opioid hx:  [ ] I-Stop Reference No:  Living Situation: [ ]Home  [ ]Long term care  [ ]Rehab [ ]Other    ADVANCE DIRECTIVES:    DNR  MOLST  [ ]  Living Will  [ ]   DECISION MAKER(s):  [ ] Health Care Proxy(s)  [ ] Surrogate(s)  [ ] Guardian           Name(s): Phone Number(s):    BASELINE (I)ADL(s) (prior to admission):  [place]: [ ]Total  [ ] Moderate [ ]Dependent    Allergies    No Known Allergies    Intolerances    MEDICATIONS  (STANDING):  allopurinol 75 milliGRAM(s) Oral daily  aMIOdarone    Tablet 400 milliGRAM(s) Oral two times a day  aspirin enteric coated 81 milliGRAM(s) Oral daily  atorvastatin 80 milliGRAM(s) Oral at bedtime  chlorhexidine 4% Liquid 1 Application(s) Topical <User Schedule>  heparin   Injectable 5000 Unit(s) SubCutaneous every 8 hours  hydrALAZINE 25 milliGRAM(s) Oral every 8 hours  influenza   Vaccine 0.5 milliLiter(s) IntraMuscular once  insulin lispro (ADMELOG) corrective regimen sliding scale   SubCutaneous three times a day before meals  insulin lispro (ADMELOG) corrective regimen sliding scale   SubCutaneous at bedtime  lidocaine   4% Patch 1 Patch Transdermal daily  milrinone Infusion 0.5 MICROgram(s)/kG/Min (11 mL/Hr) IV Continuous <Continuous>  pantoprazole    Tablet 40 milliGRAM(s) Oral before breakfast  polyethylene glycol 3350 17 Gram(s) Oral two times a day  predniSONE   Tablet 40 milliGRAM(s) Oral daily  senna 2 Tablet(s) Oral at bedtime    MEDICATIONS  (PRN):    PRESENT SYMPTOMS: [ ]  Due to Covid 19 infection data obtained from nursing assessment.  Source if other than patient:  [ ]Family   [ ]Team     Pain: [ ]yes [ ]no  QOL impact -   Location -                    Aggravating factors -  Quality -  Radiation -  Timing-  Severity (0-10 scale):  Minimal acceptable level (0-10 scale):     CPOT:    https://www.[facility].org/getattachment/[email or web address]/Critical-Care-Pain-Observation-Tool-(CPOT)      PAIN AD Score:     http://geriatrictoolkit.[email or web address]/cog/painad.pdf (press ctrl +  left click to view)    Dyspnea:                           [ ]Mild [ ]Moderate [ ]Severe  Anxiety:                             [ ]Mild [ ]Moderate [ ]Severe  Fatigue:                             [ ]Mild [ ]Moderate [ ]Severe  Nausea:                             [ ]Mild [ ]Moderate [ ]Severe  Loss of appetite:              [ ]Mild [ ]Moderate [ ]Severe  Constipation:                    [ ]Mild [ ]Moderate [ ]Severe    Other Symptoms:  [ ]All other review of systems negative     Palliative Performance Status Version 2:         %    http://npcrc.org/files/news/palliative_performance_scale_ppsv2.pdf  PHYSICAL EXAM: DUE TO COVID-19 INFECTION  physical exam findings from the clinician caring for this patient directly are used.   Vital Signs Last 24 Hrs  T(C): 36.6 (31 May 2022 08:00), Max: 36.8 (30 May 2022 23:00)  T(F): 97.8 (31 May 2022 08:00), Max: 98.2 (30 May 2022 23:00)  HR: 86 (31 May 2022 12:00) (79 - 86)  BP: 108/75 (31 May 2022 12:00) (94/58 - 119/73)  BP(mean): 86 (31 May 2022 12:00) (71 - 98)  RR: 19 (31 May 2022 12:00) (13 - 32)  SpO2: 97% (31 May 2022 12:00) (91% - 99%) I&O's Summary    30 May 2022 07:01  -  31 May 2022 07:00  --------------------------------------------------------  IN: 2301 mL / OUT: 1925 mL / NET: 376 mL    31 May 2022 07:01  -  31 May 2022 12:54  --------------------------------------------------------  IN: 535 mL / OUT: 0 mL / NET: 535 mL      GENERAL:  [ ]Alert  [ ]Oriented x   [ ]Lethargic  [ ]Cachexia  [ ]Unarousable  [ ]Verbal  [ ]Non-Verbal  Behavioral:   [ ] Anxiety  [ ] Delirium [ ] Agitation [ ] Other  HEENT:  [ ]Normal   [ ]Dry mouth   [ ]ET Tube/Trach  [ ]Oral lesions  PULMONARY:   [ ]Clear [ ]Tachypnea  [ ]Audible excessive secretions   [ ]Rhonchi        [ ]Right [ ]Left [ ]Bilateral  [ ]Crackles        [ ]Right [ ]Left [ ]Bilateral  [ ]Wheezing     [ ]Right [ ]Left [ ]Bilateral  [ ]Diminished breath sounds [ ]right [ ]left [ ]bilateral  CARDIOVASCULAR:    [ ]Regular [ ]Irregular [ ]Tachy  [ ]Aj [ ]Murmur [ ]Other  GASTROINTESTINAL:  [ ]Soft  [ ]Distended   [ ]+BS  [ ]Non tender [ ]Tender  [ ]PEG [ ]OGT/ NGT  Last BM:   GENITOURINARY:  [ ]Normal [ ] Incontinent   [ ]Oliguria/Anuria   [ ]Hernandez  MUSCULOSKELETAL:   [ ]Normal   [ ]Weakness  [ ]Bed/Wheelchair bound [ ]Edema  NEUROLOGIC:   [ ]No focal deficits  [ ]Cognitive impairment  [ ]Dysphagia [ ]Dysarthria [ ]Paresis [ ]Other   SKIN:   [ ]Normal    [ ]Rash  [ ]Pressure ulcer(s)       Present on admission [ ]y [ ]n    CRITICAL CARE:  [ ] Shock Present  [ ]Septic [ ]Cardiogenic [ ]Neurologic [ ]Hypovolemic  [ ]  Vasopressors [ ]  Inotropes   [ ]Respiratory failure present [ ]Mechanical ventilation [ ]Non-invasive ventilatory support [ ]High flow     [ ]Acute  [ ]Chronic [ ]Hypoxic  [ ]Hypercarbic [ ]Other  [ ]Other organ failure     LABS:                        15.4   10.25 )-----------( 272      ( 31 May 2022 05:00 )             46.3   05-31    134<L>  |  101  |  22  ----------------------------<  113<H>  5.4<H>   |  22  |  1.12    Ca    9.6      31 May 2022 05:00  Phos  2.9     05-31  Mg     2.4     05-31    TPro  7.3  /  Alb  3.3  /  TBili  1.0  /  DBili  x   /  AST  34  /  ALT  28  /  AlkPhos  60  05-31        D-Dimer Assay, Quantitative: 184 ng/mL DDU (05-21-22 @ 16:53)    Ferritin, Serum: 217 ng/mL (05-27-22 @ 17:02)      C-Reactive Protein, Serum: 83 mg/L (05-27-22 @ 15:23)    RADIOLOGY & ADDITIONAL STUDIES:    PROTEIN CALORIE MALNUTRITION PRESENT: [ ]mild [ ]moderate [ ]severe [ ]underweight [ ]morbid obesity  https://www.andeal.org/vault/2440/web/files/ONC/Table_Clinical%20Characteristics%20to%20Document%20Malnutrition-White%20JV%20et%20al%202012.pdf    Height (cm): 177.8 (05-26-22 @ 19:00), 177.8 (05-20-22 @ 13:48), 177.8 (04-18-22 @ 16:09)  Weight (kg): 73.5 (05-26-22 @ 19:00), 74.8 (05-20-22 @ 13:48), 74.4 (04-18-22 @ 16:09)  BMI (kg/m2): 23.3 (05-26-22 @ 19:00), 23.7 (05-20-22 @ 13:48), 23.5 (04-18-22 @ 16:09)    [ ]PPSV2 < or = to 30% [ ]significant weight loss  [ ]poor nutritional intake  [ ]anasarca      [ ]Artificial Nutrition      REFERRALS:   [ ]Chaplaincy  [ ]Hospice  [ ]Child Life  [ ]Social Work  [ ]Case management [ ]Holistic Therapy     Goals of Care Document:  HPI:  66M Mixed Ischemic/NICM Cardiomyopathy (EF 25-30% at baseline, s/p BIV-ICD), CAD (medically managed MIs in 2008,2011, Most recent stent in April 2022 to mLAD) presented with multiple near syncope, found to have 41 episodes of VT and admitted initially to cardiac telemetry for further evaluation/management. Ischemic evaluation without new disease and VT ablation without substrate to complete ablation.   Transferred from [facility] for further management and evaluation for LVAD vs OHT.    (26 May 2022 20:31)      What has the CHF team told them: patient knows he is in the ICU due to weak heart    What has the CHF team told them about LVAD: that it is an implanted pump/device    What is their understanding of LVAD: he knows it is implanted into his heart on the left side of his chest, with an attached wire that comes out of his abdomen and attached to batteries. he knows it is "heavy" about 8 lbs. he was able to identify the most common risks associated with LVAD through teach-back method including bleeding, clotting, pump malfunction, stroke and infection.     What are their thoughts of living with an LVAD, and their understanding of how it may affect ADLs: He knows that he will have some limitations due to LVAD, understanding he cant submerge in water or play contact sports.  patient does not feel this will strongly impact his quality of life as he is mainly at home and retired from construction business.     What is their understanding of heart transplant: He understands he would be waiting for a donor heart. We reviewed life long need for medication as well as dietary restrictions with frequent follow up visits. he identifies his wife as primary caregiver who is a retired nurse.     What is their social situation: Employed [] Retired [x] Who resides at home: patient lives at home with wife Teresa and daughter Priyanka. Wife is a retired nurse    Who will care for patient should he or she have an LVAD: his wife, Teresa       PERTINENT PM/SXH:   AV block    Essential hypertension    Pure hypercholesterolemia    Myocardial infarct, old    Chronic HFrEF (heart failure with reduced ejection fraction)    Chronic kidney disease, unspecified CKD stage    CAD (coronary artery disease)    HLD (hyperlipidemia)    Type 2 diabetes mellitus      Artificial cardiac pacemaker      FAMILY HISTORY:  Family history of acute myocardial infarction (Father)  72      ITEMS NOT CHECKED ARE NOT PRESENT    SOCIAL HISTORY:   Significant other/partner[x ]  Children[x ]  Christian/Spirituality:  Substance hx:  [ ]   Tobacco hx:  [ ]   Alcohol hx: [ ]   Home Opioid hx:  [ ] I-Stop Reference No:  Living Situation: [ x]Home  [ ]Long term care  [ ]Rehab [ ]Other    ADVANCE DIRECTIVES:    DNR  MOLST  [ ]  Living Will  [ ]   DECISION MAKER(s): patient completed HCP form today  [x ] Health Care Proxy(s)  [ ] Surrogate(s)  [ ] Guardian           Name(s): Phone Number(s): numbers per EMR  Primary: wife, Teresa  Alternate: sonPhilip    BASELINE (I)ADL(s) (prior to admission):  [place]: [x ]Total  [ ] Moderate [ ]Dependent    Allergies    No Known Allergies    Intolerances    MEDICATIONS  (STANDING):  allopurinol 75 milliGRAM(s) Oral daily  aMIOdarone    Tablet 400 milliGRAM(s) Oral two times a day  aspirin enteric coated 81 milliGRAM(s) Oral daily  atorvastatin 80 milliGRAM(s) Oral at bedtime  chlorhexidine 4% Liquid 1 Application(s) Topical <User Schedule>  heparin   Injectable 5000 Unit(s) SubCutaneous every 8 hours  hydrALAZINE 25 milliGRAM(s) Oral every 8 hours  influenza   Vaccine 0.5 milliLiter(s) IntraMuscular once  insulin lispro (ADMELOG) corrective regimen sliding scale   SubCutaneous three times a day before meals  insulin lispro (ADMELOG) corrective regimen sliding scale   SubCutaneous at bedtime  lidocaine   4% Patch 1 Patch Transdermal daily  milrinone Infusion 0.5 MICROgram(s)/kG/Min (11 mL/Hr) IV Continuous <Continuous>  pantoprazole    Tablet 40 milliGRAM(s) Oral before breakfast  polyethylene glycol 3350 17 Gram(s) Oral two times a day  predniSONE   Tablet 40 milliGRAM(s) Oral daily  senna 2 Tablet(s) Oral at bedtime    MEDICATIONS  (PRN):    PRESENT SYMPTOMS: [ ]  Due to Covid 19 infection data obtained from nursing assessment.  Source if other than patient:  [ ]Family   [ ]Team     Pain: [ ]yes [ x]no  QOL impact -   Location -                    Aggravating factors -  Quality -  Radiation -  Timing-  Severity (0-10 scale):  Minimal acceptable level (0-10 scale):     CPOT:    https://www.[facility].org/getattachment/[email or web address]/Critical-Care-Pain-Observation-Tool-(CPOT)      PAIN AD Score:     http://geriatrictoolkit.[email or web address]/cog/painad.pdf (press ctrl +  left click to view)    Dyspnea:                           [x ]Mild [ ]Moderate [ ]Severe  Anxiety:                             [ ]Mild [ ]Moderate [ ]Severe  Fatigue:                             [ ]Mild [ ]Moderate [ ]Severe  Nausea:                             [ ]Mild [ ]Moderate [ ]Severe  Loss of appetite:              [ ]Mild [ ]Moderate [ ]Severe  Constipation:                    [ ]Mild [ ]Moderate [ ]Severe    Other Symptoms:  [ x]All other review of systems negative     Palliative Performance Status Version 2:       60  %    http://[facility].org/files/news/palliative_performance_scale_ppsv2.pdf  PHYSICAL EXAM: DUE TO COVID-19 INFECTION  physical exam findings from the clinician caring for this patient directly are used.   Vital Signs Last 24 Hrs  T(C): 36.6 (31 May 2022 08:00), Max: 36.8 (30 May 2022 23:00)  T(F): 97.8 (31 May 2022 08:00), Max: 98.2 (30 May 2022 23:00)  HR: 86 (31 May 2022 12:00) (79 - 86)  BP: 108/75 (31 May 2022 12:00) (94/58 - 119/73)  BP(mean): 86 (31 May 2022 12:00) (71 - 98)  RR: 19 (31 May 2022 12:00) (13 - 32)  SpO2: 97% (31 May 2022 12:00) (91% - 99%) I&O's Summary    30 May 2022 07:01  -  31 May 2022 07:00  --------------------------------------------------------  IN: 2301 mL / OUT: 1925 mL / NET: 376 mL    31 May 2022 07:01  -  31 May 2022 12:54  --------------------------------------------------------  IN: 535 mL / OUT: 0 mL / NET: 535 mL      GENERAL:  [x ]Alert  [x ]Oriented x3   [ ]Lethargic  [ ]Cachexia  [ ]Unarousable  [ ]Verbal  [ ]Non-Verbal  Behavioral:   [ ] Anxiety  [ ] Delirium [ ] Agitation [ ] Other  HEENT:  [ ]Normal   [ ]Dry mouth   [ ]ET Tube/Trach  [ ]Oral lesions  PULMONARY:   [ x]Clear [ ]Tachypnea  [ ]Audible excessive secretions   [ ]Rhonchi        [ ]Right [ ]Left [ ]Bilateral  [ ]Crackles        [ ]Right [ ]Left [ ]Bilateral  [ ]Wheezing     [ ]Right [ ]Left [ ]Bilateral  [ ]Diminished breath sounds [ ]right [ ]left [ ]bilateral  CARDIOVASCULAR:  on telemetry  [x ]Regular [ ]Irregular [ ]Tachy  [ ]Aj [ ]Murmur [ ]Other  GASTROINTESTINAL:  [x ]Soft  [ ]Distended   [ ]+BS  [x ]Non tender [ ]Tender  [ ]PEG [ ]OGT/ NGT  Last BM:   GENITOURINARY:  [ x]Normal [ ] Incontinent   [ ]Oliguria/Anuria   [ ]Hernandez  MUSCULOSKELETAL:   [ ]Normal   [x ]Weakness  [ ]Bed/Wheelchair bound [ ]Edema  NEUROLOGIC:   [ x]No focal deficits  [ ]Cognitive impairment  [ ]Dysphagia [ ]Dysarthria [ ]Paresis [ ]Other   SKIN:   [ ]Normal    [ ]Rash  [ ]Pressure ulcer(s)       Present on admission [ ]y [ ]n    CRITICAL CARE:  [ ] Shock Present  [ ]Septic [x ]Cardiogenic [ ]Neurologic [ ]Hypovolemic  [ ]  Vasopressors [x ]  Inotropes   [ ]Respiratory failure present [ ]Mechanical ventilation [ ]Non-invasive ventilatory support [ ]High flow     [ ]Acute  [ ]Chronic [ ]Hypoxic  [ ]Hypercarbic [ ]Other  [ ]Other organ failure     LABS:                        15.4   10.25 )-----------( 272      ( 31 May 2022 05:00 )             46.3   05-31    134<L>  |  101  |  22  ----------------------------<  113<H>  5.4<H>   |  22  |  1.12    Ca    9.6      31 May 2022 05:00  Phos  2.9     05-31  Mg     2.4     05-31    TPro  7.3  /  Alb  3.3  /  TBili  1.0  /  DBili  x   /  AST  34  /  ALT  28  /  AlkPhos  60  05-31        D-Dimer Assay, Quantitative: 184 ng/mL DDU (05-21-22 @ 16:53)    Ferritin, Serum: 217 ng/mL (05-27-22 @ 17:02)      C-Reactive Protein, Serum: 83 mg/L (05-27-22 @ 15:23)    RADIOLOGY & ADDITIONAL STUDIES:  < from: TTE with Doppler (w/Cont) (05.27.22 @ 06:54) >    Patient name: GOCOOL, LENNOX  YOB: 1955   Age: 66 (M)   MR#: [id number]  Study Date: 5/27/2022  [facility]onographer: Elba Saldivar RDCS  Study quality: Technically fair  Referring Physician: Oren Leonardo MD  Blood Pressure: 102/66 mmHg  Height: 178 cm  Weight: 75 kg  BSA: 1.9 m2  Heart Rate: 70 mmHg  ------------------------------------------------------------------------  PROCEDURE: Transthoracic echocardiogram with 2-D, M-Mode  and complete spectral and color flowDoppler. Verbal  consent was obtained for injection of  Ultrasonic Enhancing  Agent following a discussion of risks and benefits.  Following intravenous injection of Ultrasonic Enhancing  Agent, harmonic imaging was performed.  INDICATION: Cardiomyopathy, unspecified (I42.9)  ------------------------------------------------------------------------  Dimensions:    Normal Values:  LA:     3.5    2.0 - 4.0 cm  Ao:     3.4    2.0 - 3.8 cm  SEPTUM: 1.0    0.6 - 1.2 cm  PWT:    0.9    0.6 - 1.1 cm  LVIDd:  5.2    3.0 - 5.6 cm  LVIDs:  4.9    1.8 - 4.0 cm  Derived variables:  LVMI: 94 g/m2  RWT: 0.34  Fractional short: 6 %  EF (Visual Estimate): 15 %  Doppler Peak Velocity (m/sec): AoV=1.3  ------------------------------------------------------------------------  Observations:  Mitral Valve: Tethered mitral valve leaflets with normal  opening. Mild mitral regurgitation.  Aortic Valve/Aorta: Calcified aortic valve. Peak  transaortic valve gradient equals 7 mm Hg, mean transaortic  valve gradient equals 4 mm Hg, estimated aortic valve area  equals 1.1 sqcm (by continuity equation), aortic valve  velocity time integral equals 26 cm, consistent with  moderate aortic stenosis vs low flow pseudo aortic  stenosis.  Consider further evaluation with Dobutamine stress echo if  clinically indicated.  Mild aortic regurgitation.  Peak  left ventricular outflow tract gradient equals 1 mm Hg,  mean gradient is equal to 1 mm Hg, LVOT velocity time  integral equals 9 cm.  Aortic Root: 3.4 cm.  LVOT diameter: 1.9 cm.  Left Atrium: Normal left atrium.  LA volume index = 28  cc/m2.  Left Ventricle: Severe global left ventricular systolic  dysfunction. Endocardial visualization enhanced with  intravenous injection of Ultrasonic Enhancing Agent  (Definity). No left ventricular thrombus. Normal left  ventricular internal dimensions and wall thicknesses.  Right Heart: Severe right atrial enlargement. Right  ventricular enlargement with decreased right ventricular  systolic function. A device wire is noted in the right  heart. Tethered tricuspid valve. Mild-moderate tricuspid  regurgitation. Normal pulmonic valve. No pulmonic  regurgitation.  Pericardium/Pleura: Normal pericardium with no pericardial  effusion.  Hemodynamic: Estimated right ventricular systolic pressure  equals 64.76 - 69.76 mm Hg, assuming right atrial pressure  equals 10 - 15 mm Hg, consistent with severe pulmonary  hypertension.  ------------------------------------------------------------------------  Conclusions:  1. Tethered mitral valve leaflets with normal opening. Mild  mitral regurgitation.  2. Calcified aortic valve. Peak transaortic valve gradient  equals 7 mm Hg, mean transaortic valve gradient equals 4 mm  Hg, estimated aortic valve area equals 1.1 sqcm (by  continuity equation), aortic valve velocity time integral  equals 26 cm, consistent with moderate aortic stenosis vs  low flow pseudo aortic stenosis.  Consider further evaluation with Dobutamine stress echo if  clinically indicated.  Mild aortic regurgitation.  3. Severe global left ventricular systolic dysfunction.  Endocardial visualization enhanced with intravenous  injection of Ultrasonic Enhancing Agent (Definity). No left  ventricular thrombus.  4. Right ventricular enlargement with decreased right  ventricular systolic function. A device wire is noted in  the right heart.  5. Estimated pulmonary artery systolic pressure equals 65  mm Hg, assuming right atrial pressure equals 10 - 15 mm Hg,  consistent with severe pulmonary pressures.  6. Normal pericardium with no pericardial effusion.  *** No previous Echo exam.  ------------------------------------------------------------------------  Confirmed on  5/27/2022 - 17:29:27 by Reyes Alexander M.D.  ------------------------------------------------------------------------    < end of copied text >      PROTEIN CALORIE MALNUTRITION PRESENT: [ ]mild [ ]moderate [ ]severe [ ]underweight [ ]morbid obesity  https://www.andeal.org/vault/6270/web/files/ONC/Table_Clinical%20Characteristics%20to%20Document%20Malnutrition-White%20JV%20et%20al%202012.pdf    Height (cm): 177.8 (05-26-22 @ 19:00), 177.8 (05-20-22 @ 13:48), 177.8 (04-18-22 @ 16:09)  Weight (kg): 73.5 (05-26-22 @ 19:00), 74.8 (05-20-22 @ 13:48), 74.4 (04-18-22 @ 16:09)  BMI (kg/m2): 23.3 (05-26-22 @ 19:00), 23.7 (05-20-22 @ 13:48), 23.5 (04-18-22 @ 16:09)    [ ]PPSV2 < or = to 30% [ ]significant weight loss  [ ]poor nutritional intake  [ ]anasarca      [ ]Artificial Nutrition      REFERRALS:   [ ]Chaplaincy  [ ]Hospice  [ ]Child Life  [x ]Social Work  [ ]Case management [ ]Holistic Therapy     Goals of Care Document:

## 2022-05-31 NOTE — DIETITIAN INITIAL EVALUATION ADULT - LITERATURE/VIDEOS GIVEN
Coumadin Packet, Heart Failure Nutrition Therapy, USDA Food Safety for Transplant Recipients, Nutrition Goals s/p Transplant

## 2022-05-31 NOTE — PROGRESS NOTE ADULT - PROBLEM SELECTOR PLAN 1
- C/w milrinone to 0.5 mcg/kg/min  - CI/CO improved after AV pacing rate increased to 80  - Continue hydralizine to 25 mg TID   - PAC/cordis discontinued 05/29 given fever   - Pending advanced therapies eval for LVAD/transplant (ABO A, PRA 0%), needs colonoscopy (virtual pending tomorrow). Of note he had within a 24 hour span on 5/27-5/28 a SBP < 90, PCWP > 20, and CI of 1.7 on inotropes  - OK to transfer out of CICU  - currently undergoing advance therapy eval, plan to present to the selection committee this upcoming Thursday No

## 2022-05-31 NOTE — CONSULT NOTE ADULT - PROBLEM SELECTOR RECOMMENDATION 2
HCP form completed today, >16 minutes spent on ACP  ACP discussion held today in case of emergency or serious illness. patient has not had conversation specifically with his wife about this but acknowledges that if he were so sick that he life could only be sustained by machines, that that would not represent a quality of life he would find acceptable.   goals at present time are for all life sustaining treatments aimed at prolonging life and inclusive of LVAD/OHT if deemed a candidate

## 2022-05-31 NOTE — DIETITIAN INITIAL EVALUATION ADULT - NSFNSADHERENCEPTAFT_GEN_A_CORE
Pt on a low Na and no concentrated sweets diet PTA. Pt's wife does the cooking and does not add salt to foods. Has not had take out food in >2 years. Hx of prediabetes per pt, started on Glimepiride 3 weeks ago. Pt occasionally checks blood glucose with normal range <150 mg/dL. Recent A1C 6.2 % (05-20-22 @ 14:17) indicating good glycemic control.

## 2022-05-31 NOTE — DIETITIAN INITIAL EVALUATION ADULT - OTHER INFO
Wt Hx: Dosing wt 73.5 kG (162 lbs). Daily wt in lbs: 161.3 (5/27), 164 (5/31). UBW ~168 lbs. Endorses slight unintentional wt loss during admission possibly secondary to fluid shifts as pt with HF. Pt weighs self every week. Wt Hx per HIE - lbs: 163 (1/3/17), 168 (8/27/18), 164 (4/13/22). RD will continue to trend as new wts available/able.     Nutrition-Related Concerns:   - LVAD/Heart transplant evaluation   - JAGRUTI on CKD II resolved     Impression:  Wt Hx: Dosing wt 73.5 kG (162 lbs). Daily wt in lbs: 161.3 (5/27), 164 (5/31). UBW ~168 lbs. Endorses slight unintentional wt loss during admission possibly secondary to fluid shifts as pt with HF. Pt weighs self every week. Wt Hx per HIE - lbs: 163 (1/3/17), 168 (8/27/18), 164 (4/13/22). RD will continue to trend as new wts available/able.     Nutrition-Related Concerns:   - LVAD/Heart transplant evaluation   - JAGRUTI on CKD II resolved     Impression: At this time, no nutritional barriers to proceeding with LVAD/heart transplant.

## 2022-05-31 NOTE — PROGRESS NOTE ADULT - PROBLEM SELECTOR PLAN 3
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 Nipride study 5/29 with reduction in PVR to < 2 though still with elevated PA pressures   - Suspect will improve greatly with MCS unloading   - Goal PVR < 3.0 for transplant candidacy

## 2022-05-31 NOTE — CONSULT NOTE ADULT - ASSESSMENT
66M Mixed Ischemic/NICM Cardiomyopathy (EF 25-30% at baseline, s/p BIV-ICD), CAD,Transferred from [facility] for further management and evaluation for LVAD vs OHT. Palliative consulted as part of evaluation process.

## 2022-05-31 NOTE — PROGRESS NOTE ADULT - SUBJECTIVE AND OBJECTIVE BOX
PATIENT:  LENNOX GOCOOL  [id number]    CHIEF COMPLAINT:  Patient is a 66y old  Male who presents with a chief complaint of LVAD/OHT eval (30 May 2022 20:04)      INTERVAL HISTORY/OVERNIGHT EVENTS:  Pt seen and examined. No acute overnight events. Pt reported no fever, chills, CP, SOB, ab. pain, urine or bowel issues  -Hydral increased to 25TID from 10mg TID     MEDICATIONS:  MEDICATIONS  (STANDING):  allopurinol 75 milliGRAM(s) Oral daily  aMIOdarone    Tablet 400 milliGRAM(s) Oral two times a day  aspirin enteric coated 81 milliGRAM(s) Oral daily  atorvastatin 80 milliGRAM(s) Oral at bedtime  bisacodyl Suppository 10 milliGRAM(s) Rectal once  chlorhexidine 4% Liquid 1 Application(s) Topical <User Schedule>  heparin   Injectable 5000 Unit(s) SubCutaneous every 8 hours  hydrALAZINE 25 milliGRAM(s) Oral every 8 hours  influenza   Vaccine 0.5 milliLiter(s) IntraMuscular once  insulin lispro (ADMELOG) corrective regimen sliding scale   SubCutaneous three times a day before meals  insulin lispro (ADMELOG) corrective regimen sliding scale   SubCutaneous at bedtime  lidocaine   4% Patch 1 Patch Transdermal daily  milrinone Infusion 0.5 MICROgram(s)/kG/Min (11 mL/Hr) IV Continuous <Continuous>  pantoprazole    Tablet 40 milliGRAM(s) Oral before breakfast  polyethylene glycol 3350 17 Gram(s) Oral two times a day  predniSONE   Tablet 40 milliGRAM(s) Oral daily  senna 2 Tablet(s) Oral at bedtime    MEDICATIONS  (PRN):      ALLERGIES:  Allergies    No Known Allergies    Intolerances        OBJECTIVE:  ICU Vital Signs Last 24 Hrs  T(C): 36.7 (31 May 2022 03:00), Max: 37 (30 May 2022 12:00)  T(F): 98.1 (31 May 2022 03:00), Max: 98.6 (30 May 2022 12:00)  HR: 80 (31 May 2022 07:00) (79 - 82)  BP: 103/63 (31 May 2022 07:00) (94/58 - 119/73)  BP(mean): 78 (31 May 2022 07:00) (71 - 98)  ABP: 95/53 (30 May 2022 11:00) (95/53 - 134/67)  ABP(mean): 68 (30 May 2022 11:00) (68 - 94)  RR: 21 (31 May 2022 07:00) (13 - 32)  SpO2: 96% (31 May 2022 07:00) (91% - 98%)    I&O's Summary    30 May 2022 07:01  -  31 May 2022 07:00  --------------------------------------------------------  IN: 2301 mL / OUT: 1925 mL / NET: 376 mL      Daily     Daily Weight in k.4 (31 May 2022 04:00)  Adult Advanced Hemodynamics Last 24 Hrs  CVP(mm Hg): --  CVP(cm H2O): --  CO: --  CI: --  PA: --  PA(mean): --  PCWP: --  SVR: --  SVRI: --  PVR: --  PVRI: --        PHYSICAL EXAMINATION:  General: WN/WD NAD  HEENT: PERRLA, EOMI, moist mucous membranes  Neurology: A&Ox3, nonfocal, BRADFORD x 4  Respiratory: CTA B/L, normal respiratory effort, no wheezes, crackles, rales  CV: RRR, S1S2, no murmurs, rubs or gallops  Abdominal: Soft, NT, ND +BS, Last BM  Extremities: No edema, + peripheral pulses  Incisions:   Tubes:  All lines are intact, no pulsatile masses or cyanosis    LABS:                          15.4   10.25 )-----------( 272      ( 31 May 2022 05:00 )             46.3     0531    134<L>  |  101  |  22  ----------------------------<  113<H>  5.4<H>   |  22  |  1.12    Ca    9.6      31 May 2022 05:00  Phos  2.9     05-31  Mg     2.4     05-31    TPro  7.3  /  Alb  3.3  /  TBili  1.0  /  DBili  x   /  AST  34  /  ALT  28  /  AlkPhos  60  05-31    LIVER FUNCTIONS - ( 31 May 2022 05:00 )  Alb: 3.3 g/dL / Pro: 7.3 g/dL / ALK PHOS: 60 U/L / ALT: 28 U/L / AST: 34 U/L / GGT: x                     CAPILLARY BLOOD GLUCOSE      POCT Blood Glucose.: 131 mg/dL (30 May 2022 23:49)  POCT Blood Glucose.: 118 mg/dL (30 May 2022 17:03)  POCT Blood Glucose.: 143 mg/dL (30 May 2022 12:21)  POCT Blood Glucose.: 99 mg/dL (30 May 2022 08:23)    CAPILLARY BLOOD GLUCOSE      POCT Blood Glucose.: 131 mg/dL (30 May 2022 23:49)      TELEMETRY:     EKG:   NSR  Rate   CT interval <200  QRS <120  QTc <500  No Depressions/Elevations    IMAGING:       PATIENT:  LENNOX GOCOOL  [id number]    CHIEF COMPLAINT:  Patient is a 66y old  Male who presents with a chief complaint of LVAD/OHT eval (30 May 2022 20:04)      INTERVAL HISTORY/OVERNIGHT EVENTS:  Pt seen and examined. No acute overnight events. Pt reported no fever, chills, CP, SOB, ab. pain, urine or bowel issues  Tele reviewed with no events   -Hydral increased to 25TID from 10mg TID     MEDICATIONS:  MEDICATIONS  (STANDING):  allopurinol 75 milliGRAM(s) Oral daily  aMIOdarone    Tablet 400 milliGRAM(s) Oral two times a day  aspirin enteric coated 81 milliGRAM(s) Oral daily  atorvastatin 80 milliGRAM(s) Oral at bedtime  bisacodyl Suppository 10 milliGRAM(s) Rectal once  chlorhexidine 4% Liquid 1 Application(s) Topical <User Schedule>  heparin   Injectable 5000 Unit(s) SubCutaneous every 8 hours  hydrALAZINE 25 milliGRAM(s) Oral every 8 hours  influenza   Vaccine 0.5 milliLiter(s) IntraMuscular once  insulin lispro (ADMELOG) corrective regimen sliding scale   SubCutaneous three times a day before meals  insulin lispro (ADMELOG) corrective regimen sliding scale   SubCutaneous at bedtime  lidocaine   4% Patch 1 Patch Transdermal daily  milrinone Infusion 0.5 MICROgram(s)/kG/Min (11 mL/Hr) IV Continuous <Continuous>  pantoprazole    Tablet 40 milliGRAM(s) Oral before breakfast  polyethylene glycol 3350 17 Gram(s) Oral two times a day  predniSONE   Tablet 40 milliGRAM(s) Oral daily  senna 2 Tablet(s) Oral at bedtime    MEDICATIONS  (PRN):      ALLERGIES:  Allergies    No Known Allergies    Intolerances        OBJECTIVE:  ICU Vital Signs Last 24 Hrs  T(C): 36.7 (31 May 2022 03:00), Max: 37 (30 May 2022 12:00)  T(F): 98.1 (31 May 2022 03:00), Max: 98.6 (30 May 2022 12:00)  HR: 80 (31 May 2022 07:00) (79 - 82)  BP: 103/63 (31 May 2022 07:00) (94/58 - 119/73)  BP(mean): 78 (31 May 2022 07:00) (71 - 98)  ABP: 95/53 (30 May 2022 11:00) (95/53 - 134/67)  ABP(mean): 68 (30 May 2022 11:00) (68 - 94)  RR: 21 (31 May 2022 07:00) (13 - 32)  SpO2: 96% (31 May 2022 07:00) (91% - 98%)    I&O's Summary    30 May 2022 07:01  -  31 May 2022 07:00  --------------------------------------------------------  IN: 2301 mL / OUT: 1925 mL / NET: 376 mL      Daily     Daily Weight in k.4 (31 May 2022 04:00)  Adult Advanced Hemodynamics Last 24 Hrs  CVP(mm Hg): --  CVP(cm H2O): --  CO: --  CI: --  PA: --  PA(mean): --  PCWP: --  SVR: --  SVRI: --  PVR: --  PVRI: --        PHYSICAL EXAMINATION:  General: WN/WD NAD  HEENT: PERRLA, EOMI, moist mucous membranes  Neurology: A&Ox3, nonfocal, BRADFORD x 4  Respiratory: CTA B/L, normal respiratory effort, no wheezes, crackles, rales  CV: RRR, S1S2, no murmurs, rubs or gallops  Abdominal: Soft, NT, ND +BS, Last BM  Extremities: No edema, + peripheral pulses  Incisions:   Tubes:  All lines are intact, no pulsatile masses or cyanosis    LABS:                          15.4   10.25 )-----------( 272      ( 31 May 2022 05:00 )             46.3     0531    134<L>  |  101  |  22  ----------------------------<  113<H>  5.4<H>   |  22  |  1.12    Ca    9.6      31 May 2022 05:00  Phos  2.9       Mg     2.4         TPro  7.3  /  Alb  3.3  /  TBili  1.0  /  DBili  x   /  AST  34  /  ALT  28  /  AlkPhos  60      LIVER FUNCTIONS - ( 31 May 2022 05:00 )  Alb: 3.3 g/dL / Pro: 7.3 g/dL / ALK PHOS: 60 U/L / ALT: 28 U/L / AST: 34 U/L / GGT: x                     CAPILLARY BLOOD GLUCOSE      POCT Blood Glucose.: 131 mg/dL (30 May 2022 23:49)  POCT Blood Glucose.: 118 mg/dL (30 May 2022 17:03)  POCT Blood Glucose.: 143 mg/dL (30 May 2022 12:21)  POCT Blood Glucose.: 99 mg/dL (30 May 2022 08:23)    CAPILLARY BLOOD GLUCOSE      POCT Blood Glucose.: 131 mg/dL (30 May 2022 23:49)

## 2022-05-31 NOTE — CHART NOTE - NSCHARTNOTEFT_GEN_A_CORE
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 LHC did not reveal new obstructive CAD and her underwent EPS which did not reveal endocardial substrate amenable for ablation. RHC revealed severely depressed cardiac output and he was transferred to [facility] 5/26 for advanced therapies evaluation.    He has improved significantly with sequential uptitration of inotropes and increased pacing rate. He is INTERMACS 3 and SCAI stage B. He will likely need advanced therapies this admission. He has significant pulmonary hypertension but PVR is reversible with nipride and suspect would improve with LV unloading given severely elevated PCWP despite euvolemia and low diastolic pulmonary gradient.        MEDICATIONS:  STANDING MEDICATIONS  allopurinol 75 milliGRAM(s) Oral daily  aMIOdarone    Tablet 400 milliGRAM(s) Oral two times a day  aspirin enteric coated 81 milliGRAM(s) Oral daily  atorvastatin 80 milliGRAM(s) Oral at bedtime  chlorhexidine 4% Liquid 1 Application(s) Topical <User Schedule>  heparin   Injectable 5000 Unit(s) SubCutaneous every 8 hours  hydrALAZINE 25 milliGRAM(s) Oral every 8 hours  influenza   Vaccine 0.5 milliLiter(s) IntraMuscular once  insulin lispro (ADMELOG) corrective regimen sliding scale   SubCutaneous three times a day before meals  insulin lispro (ADMELOG) corrective regimen sliding scale   SubCutaneous at bedtime  lidocaine   4% Patch 1 Patch Transdermal daily  milrinone Infusion 0.5 MICROgram(s)/kG/Min IV Continuous <Continuous>  pantoprazole    Tablet 40 milliGRAM(s) Oral before breakfast  polyethylene glycol 3350 17 Gram(s) Oral two times a day  predniSONE   Tablet 40 milliGRAM(s) Oral daily  senna 2 Tablet(s) Oral at bedtime    PRN MEDICATIONS      VITAL SIGNS: Last 24 Hours  T(C): 36.5 (31 May 2022 12:00), Max: 36.8 (30 May 2022 23:00)  T(F): 97.7 (31 May 2022 12:00), Max: 98.2 (30 May 2022 23:00)  HR: 80 (31 May 2022 14:00) (79 - 86)  BP: 111/69 (31 May 2022 14:00) (94/58 - 119/73)  BP(mean): 85 (31 May 2022 14:00) (71 - 98)  RR: 18 (31 May 2022 14:00) (13 - 29)  SpO2: 94% (31 May 2022 14:00) (91% - 99%)    LABS:                        15.4   10.25 )-----------( 272      ( 31 May 2022 05:00 )             46.3     05-31    135  |  101  |  21  ----------------------------<  224<H>  5.0   |  22  |  1.16    Ca    9.3      31 May 2022 14:44  Phos  2.9     05-31  Mg     2.4     05-31    TPro  7.3  /  Alb  3.3  /  TBili  1.0  /  DBili  x   /  AST  34  /  ALT  28  /  AlkPhos  60  05-31            Culture - Blood (collected 28 May 2022 18:48)  Source: .Blood Blood  Preliminary Report (29 May 2022 23:01):    No growth to date.    Culture - Blood (collected 28 May 2022 18:48)  Source: .Blood Blood  Preliminary Report (29 May 2022 23:01):    No growth to date.          RADIOLOGY:    CCU COURSE:  Admitted in cardiogenic shock and status improved on milrinone and increase in pacing         ASSESSMENT & PLAN:   65 y/o male w/ PMHx HTN, HLD, type 2 DM, chronic HFrEF (EF 25-30% by Echo), complete heart block s/p PPM (in 2006, upgraded to BiV-ICD in 09/2016), CAD (s/p recent BERNABE mid LAD at [facility] on 04/18/2022), and stage II CKD (baseline Cr ~1.3) presented with near syncope to OS found to have 41 episodes of VT on device, s/p unsuccessful VT ablation and transferred to [facility] for management of cardiogenic shock and advanced therapy eval.     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 Nipride study 5/29 with reduction in PVR to < 2 though still with elevated PA pressures   - comfortable on room air, continue to monitor Spo2 with goal >94%      ====================CARDIOVASCULAR==================  Cardiogenic shock   - TTE 5/21: LV 5.2 cm, LVEF 10-15%, LVOT VTI 10 cm, moderate RV dysfunction, mild AI, minimal MR  - [facility] 5/23: patent mLAD stent with slow flow, D1 with 40-50% stenosis  - [facility] 5/26: RA 12, PA 70/40 (50), PCWP 22, Milana CO/CI 2.3/1.3, MAP 83 with SVR 2469, PVR 12   - 5/27: RA 10, PA 76/35 (49), PCWP 34, PA sat 57% with Milana CO/CI 3.1/1.6, MAP 71 with SVR 1574, PVR 4.8  - Maintain Milrinone .5mcg/kg/min.  monitor perfusion indices and lactate since swan removed  - monitor strict IOs and daily wts   - Undergoing VAD/OHT workup,   - low threshold for escalation to IABP if hemodynamics worsen     VTach   - s/p EPS on 5/24, unable to perform ablation as no substrate found and did not induce VT because of severely low EF   - Interrogation of ICD @Dr Wilson's office 5/20: back-to-back episodes of VT @ 200+ bpm all terminating with ATP. Since last cath pt has had 41 VT episodes (all falling into VF zone)  - Normal device function. BIV pacing 97%. No programming changes made  -. c/w Amiodarone 400 mg PO BID load, monitor LFTs   - BB held due to cardiogenic shock  - undergoing OHT/LVAD eval as above   - no further VT on inotropes     CAD   - Chest pain free and compliant with DAPT since last PCI 4/18/22  - Cardiac cath @[facility] 4/18/22: mLAD 90% s/p BERNABE, LCx mild disease, RCA mild disease s/p diagnostic cardiac cath w/ Dr Wagner on 05/23/2022   - Cont ASA and Lipitor   - per discussion with interventional at OSH, ok to DC plavix at this time given possible LVAD     ===================HEMATOLOGIC/ONC ===================  H/H and plts stable  - continue to monitor CBC  - HSQ for DVT ppx       heparin   Injectable 5000 Unit(s) SubCutaneous every 8 hours    ===================== RENAL =========================  JAGRUTI on CKD II, now improved   Admitted w/ Cr 2.01 (baseline Cr ~1.3) now trending down and approaching baseline.   - Avoid nephrotoxic agents, NSAIDs. Renally dose meds.  - monitor strict IOs and continue current cardiac support       ==================== GASTROINTESTINAL===================  no active issues       =======================    ENDOCRINE  =====================  T2DM (A1C 6.2 on admission)  - Cont. ISS, glucose controlled, monitor FS closely now that pt is on steroids     Gout flare, right knee   - continue renally dosed allopurinol   - continue prednisone       ========================INFECTIOUS DISEASE================  Febrile 5/28, cordis and carri discontinued  - infectious workup sent including RVP (negative), UA(negative), and BCx x2  - trend fever and wbc        For Follow-Up:  [ ] f/u HF recs for ongoing advanced therapies discussion   [ ] prednisone for gout until 6/2 CCU Transfer Note    Transfer from: CCU  Transfer to:  (  ) Medicine    ( x ) Telemetry    (  ) RCU    (  ) Palliative    (  ) Stroke Unit    (  ) _______________  Accepting physician:      65 YO M with a history of ACC/AHA 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 LHC did not reveal new obstructive CAD and her underwent EPS which did not reveal endocardial substrate amenable for ablation. RHC revealed severely depressed cardiac output and he was transferred to [facility] 5/26 for advanced therapies evaluation.    He has improved significantly with sequential uptitration of inotropes and increased pacing rate. He is INTERMACS 3 and SCAI stage B. He will likely need advanced therapies this admission. He has significant pulmonary hypertension but PVR is reversible with nipride and suspect would improve with LV unloading given severely elevated PCWP despite euvolemia and low diastolic pulmonary gradient.        MEDICATIONS:  STANDING MEDICATIONS  allopurinol 75 milliGRAM(s) Oral daily  aMIOdarone    Tablet 400 milliGRAM(s) Oral two times a day  aspirin enteric coated 81 milliGRAM(s) Oral daily  atorvastatin 80 milliGRAM(s) Oral at bedtime  chlorhexidine 4% Liquid 1 Application(s) Topical <User Schedule>  heparin   Injectable 5000 Unit(s) SubCutaneous every 8 hours  hydrALAZINE 25 milliGRAM(s) Oral every 8 hours  influenza   Vaccine 0.5 milliLiter(s) IntraMuscular once  insulin lispro (ADMELOG) corrective regimen sliding scale   SubCutaneous three times a day before meals  insulin lispro (ADMELOG) corrective regimen sliding scale   SubCutaneous at bedtime  lidocaine   4% Patch 1 Patch Transdermal daily  milrinone Infusion 0.5 MICROgram(s)/kG/Min IV Continuous <Continuous>  pantoprazole    Tablet 40 milliGRAM(s) Oral before breakfast  polyethylene glycol 3350 17 Gram(s) Oral two times a day  predniSONE   Tablet 40 milliGRAM(s) Oral daily  senna 2 Tablet(s) Oral at bedtime    PRN MEDICATIONS      VITAL SIGNS: Last 24 Hours  T(C): 36.5 (31 May 2022 12:00), Max: 36.8 (30 May 2022 23:00)  T(F): 97.7 (31 May 2022 12:00), Max: 98.2 (30 May 2022 23:00)  HR: 80 (31 May 2022 14:00) (79 - 86)  BP: 111/69 (31 May 2022 14:00) (94/58 - 119/73)  BP(mean): 85 (31 May 2022 14:00) (71 - 98)  RR: 18 (31 May 2022 14:00) (13 - 29)  SpO2: 94% (31 May 2022 14:00) (91% - 99%)    LABS:                        15.4   10.25 )-----------( 272      ( 31 May 2022 05:00 )             46.3     05-31    135  |  101  |  21  ----------------------------<  224<H>  5.0   |  22  |  1.16    Ca    9.3      31 May 2022 14:44  Phos  2.9     05-31  Mg     2.4     05-31    TPro  7.3  /  Alb  3.3  /  TBili  1.0  /  DBili  x   /  AST  34  /  ALT  28  /  AlkPhos  60  05-31            Culture - Blood (collected 28 May 2022 18:48)  Source: .Blood Blood  Preliminary Report (29 May 2022 23:01):    No growth to date.    Culture - Blood (collected 28 May 2022 18:48)  Source: .Blood Blood  Preliminary Report (29 May 2022 23:01):    No growth to date.          RADIOLOGY:    CCU COURSE:  Admitted in cardiogenic shock and status improved on milrinone and increase in pacing         ASSESSMENT & PLAN:   67 y/o male w/ PMHx HTN, HLD, type 2 DM, chronic HFrEF (EF 25-30% by Echo), complete heart block s/p PPM (in 2006, upgraded to BiV-ICD in 09/2016), CAD (s/p recent BERNABE mid LAD at [facility] on 04/18/2022), and stage II CKD (baseline Cr ~1.3) presented with near syncope to OS found to have 41 episodes of VT on device, s/p unsuccessful VT ablation and transferred to [facility] for management of cardiogenic shock and advanced therapy eval.     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 Nipride study 5/29 with reduction in PVR to < 2 though still with elevated PA pressures   - comfortable on room air, continue to monitor Spo2 with goal >94%      ====================CARDIOVASCULAR==================  Cardiogenic shock   - TTE 5/21: LV 5.2 cm, LVEF 10-15%, LVOT VTI 10 cm, moderate RV dysfunction, mild AI, minimal MR  - [facility] 5/23: patent mLAD stent with slow flow, D1 with 40-50% stenosis  - [facility] 5/26: RA 12, PA 70/40 (50), PCWP 22, Milana CO/CI 2.3/1.3, MAP 83 with SVR 2469, PVR 12   - 5/27: RA 10, PA 76/35 (49), PCWP 34, PA sat 57% with [place] CO/CI 3.1/1.6, MAP 71 with SVR 1574, PVR 4.8  - Maintain Milrinone .5mcg/kg/min.  monitor perfusion indices and lactate since swan removed  - monitor strict IOs and daily wts   - Undergoing VAD/OHT workup,   - low threshold for escalation to IABP if hemodynamics worsen     VTach   - s/p EPS on 5/24, unable to perform ablation as no substrate found and did not induce VT because of severely low EF   - Interrogation of ICD @Dr Wilson's office 5/20: back-to-back episodes of VT @ 200+ bpm all terminating with ATP. Since last cath pt has had 41 VT episodes (all falling into VF zone)  - Normal device function. BIV pacing 97%. No programming changes made  -. c/w Amiodarone 400 mg PO BID load, monitor LFTs   - BB held due to cardiogenic shock  - undergoing OHT/LVAD eval as above   - no further VT on inotropes     CAD   - Chest pain free and compliant with DAPT since last PCI 4/18/22  - Cardiac cath @[facility] 4/18/22: mLAD 90% s/p BERNABE, LCx mild disease, RCA mild disease s/p diagnostic cardiac cath w/ Dr Wagner on 05/23/2022   - Cont ASA and Lipitor   - per discussion with interventional at OSH, ok to DC plavix at this time given possible LVAD     ===================HEMATOLOGIC/ONC ===================  H/H and plts stable  - continue to monitor CBC  - HSQ for DVT ppx       heparin   Injectable 5000 Unit(s) SubCutaneous every 8 hours    ===================== RENAL =========================  JAGRUTI on CKD II, now improved   Admitted w/ Cr 2.01 (baseline Cr ~1.3) now trending down and approaching baseline.   - Avoid nephrotoxic agents, NSAIDs. Renally dose meds.  - monitor strict IOs and continue current cardiac support       ==================== GASTROINTESTINAL===================  no active issues       =======================    ENDOCRINE  =====================  T2DM (A1C 6.2 on admission)  - Cont. ISS, glucose controlled, monitor FS closely now that pt is on steroids     Gout flare, right knee   - continue renally dosed allopurinol   - continue prednisone for 2 more days       ========================INFECTIOUS DISEASE================  Febrile 5/28, cordis and carri discontinued  - infectious workup sent including RVP (negative), UA(negative), and BCx x2  - trend fever and wbc      For Follow-Up:  [ ] f/u HF recs for ongoing advanced therapies discussion   [ ] prednisone for gout until 6/2 CCU Transfer Note    Transfer from: CCU  Transfer to:  (  ) Medicine    ( x ) Telemetry    (  ) RCU    (  ) Palliative    (  ) Stroke Unit    (  ) _______________  Accepting physician: Dr. 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 LHC did not reveal new obstructive CAD and her underwent EPS which did not reveal endocardial substrate amenable for ablation. RHC revealed severely depressed cardiac output and he was transferred to [facility] 5/26 for advanced therapies evaluation.    He has improved significantly with sequential uptitration of inotropes and increased pacing rate. He is INTERMACS 3 and SCAI stage B. He will likely need advanced therapies this admission. He has significant pulmonary hypertension but PVR is reversible with nipride and suspect would improve with LV unloading given severely elevated PCWP despite euvolemia and low diastolic pulmonary gradient.        MEDICATIONS:  STANDING MEDICATIONS  allopurinol 75 milliGRAM(s) Oral daily  aMIOdarone    Tablet 400 milliGRAM(s) Oral two times a day  aspirin enteric coated 81 milliGRAM(s) Oral daily  atorvastatin 80 milliGRAM(s) Oral at bedtime  chlorhexidine 4% Liquid 1 Application(s) Topical <User Schedule>  heparin   Injectable 5000 Unit(s) SubCutaneous every 8 hours  hydrALAZINE 25 milliGRAM(s) Oral every 8 hours  influenza   Vaccine 0.5 milliLiter(s) IntraMuscular once  insulin lispro (ADMELOG) corrective regimen sliding scale   SubCutaneous three times a day before meals  insulin lispro (ADMELOG) corrective regimen sliding scale   SubCutaneous at bedtime  lidocaine   4% Patch 1 Patch Transdermal daily  milrinone Infusion 0.5 MICROgram(s)/kG/Min IV Continuous <Continuous>  pantoprazole    Tablet 40 milliGRAM(s) Oral before breakfast  polyethylene glycol 3350 17 Gram(s) Oral two times a day  predniSONE   Tablet 40 milliGRAM(s) Oral daily  senna 2 Tablet(s) Oral at bedtime    PRN MEDICATIONS      VITAL SIGNS: Last 24 Hours  T(C): 36.5 (31 May 2022 12:00), Max: 36.8 (30 May 2022 23:00)  T(F): 97.7 (31 May 2022 12:00), Max: 98.2 (30 May 2022 23:00)  HR: 80 (31 May 2022 14:00) (79 - 86)  BP: 111/69 (31 May 2022 14:00) (94/58 - 119/73)  BP(mean): 85 (31 May 2022 14:00) (71 - 98)  RR: 18 (31 May 2022 14:00) (13 - 29)  SpO2: 94% (31 May 2022 14:00) (91% - 99%)    LABS:                        15.4   10.25 )-----------( 272      ( 31 May 2022 05:00 )             46.3     05-31    135  |  101  |  21  ----------------------------<  224<H>  5.0   |  22  |  1.16    Ca    9.3      31 May 2022 14:44  Phos  2.9     05-31  Mg     2.4     05-31    TPro  7.3  /  Alb  3.3  /  TBili  1.0  /  DBili  x   /  AST  34  /  ALT  28  /  AlkPhos  60  05-31            Culture - Blood (collected 28 May 2022 18:48)  Source: .Blood Blood  Preliminary Report (29 May 2022 23:01):    No growth to date.    Culture - Blood (collected 28 May 2022 18:48)  Source: .Blood Blood  Preliminary Report (29 May 2022 23:01):    No growth to date.          RADIOLOGY:    CCU COURSE:  Admitted in cardiogenic shock and status improved on milrinone and increase in pacing         ASSESSMENT & PLAN:   65 y/o male w/ PMHx HTN, HLD, type 2 DM, chronic HFrEF (EF 25-30% by Echo), complete heart block s/p PPM (in 2006, upgraded to BiV-ICD in 09/2016), CAD (s/p recent BERNABE mid LAD at [facility] on 04/18/2022), and stage II CKD (baseline Cr ~1.3) presented with near syncope to OSH found to have 41 episodes of VT on device, s/p unsuccessful VT ablation and transferred to [facility] for management of cardiogenic shock and advanced therapy eval.     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 Nipride study 5/29 with reduction in PVR to < 2 though still with elevated PA pressures   - comfortable on room air, continue to monitor Spo2 with goal >94%      ====================CARDIOVASCULAR==================  Cardiogenic shock   - TTE 5/21: LV 5.2 cm, LVEF 10-15%, LVOT VTI 10 cm, moderate RV dysfunction, mild AI, minimal MR  - [facility] 5/23: patent mLAD stent with slow flow, D1 with 40-50% stenosis  - [facility] 5/26: RA 12, PA 70/40 (50), PCWP 22, Milana CO/CI 2.3/1.3, MAP 83 with SVR 2469, PVR 12   - 5/27: RA 10, PA 76/35 (49), PCWP 34, PA sat 57% with Milana CO/CI 3.1/1.6, MAP 71 with SVR 1574, PVR 4.8  - Maintain Milrinone .5mcg/kg/min.  monitor perfusion indices and lactate since swan removed  - monitor strict IOs and daily wts   - Undergoing VAD/OHT workup,   - low threshold for escalation to IABP if hemodynamics worsen     VTach   - s/p EPS on 5/24, unable to perform ablation as no substrate found and did not induce VT because of severely low EF   - Interrogation of ICD @Dr Wilson's office 5/20: back-to-back episodes of VT @ 200+ bpm all terminating with ATP. Since last cath pt has had 41 VT episodes (all falling into VF zone)  - Normal device function. BIV pacing 97%. No programming changes made  -. c/w Amiodarone 400 mg PO BID load, monitor LFTs   - BB held due to cardiogenic shock  - undergoing OHT/LVAD eval as above   - no further VT on inotropes     CAD   - Chest pain free and compliant with DAPT since last PCI 4/18/22  - Cardiac cath @[facility] 4/18/22: mLAD 90% s/p BERNABE, LCx mild disease, RCA mild disease s/p diagnostic cardiac cath w/ Dr Wagner on 05/23/2022   - Cont ASA and Lipitor   - per discussion with interventional at OSH, ok to DC plavix at this time given possible LVAD     ===================HEMATOLOGIC/ONC ===================  H/H and plts stable  - continue to monitor CBC  - HSQ for DVT ppx       heparin   Injectable 5000 Unit(s) SubCutaneous every 8 hours    ===================== RENAL =========================  JAGRUTI on CKD II, now improved   Admitted w/ Cr 2.01 (baseline Cr ~1.3) now trending down and approaching baseline.   - Avoid nephrotoxic agents, NSAIDs. Renally dose meds.  - monitor strict IOs and continue current cardiac support       ==================== GASTROINTESTINAL===================  no active issues       =======================    ENDOCRINE  =====================  T2DM (A1C 6.2 on admission)  - Cont. ISS, glucose controlled, monitor FS closely now that pt is on steroids     Gout flare, right knee   - continue renally dosed allopurinol   - continue prednisone for 2 more days       ========================INFECTIOUS DISEASE================  Febrile 5/28, cordis and carri discontinued  - infectious workup sent including RVP (negative), UA(negative), and BCx x2  - trend fever and wbc      For Follow-Up:  [ ] f/u HF recs for ongoing advanced therapies discussion   [ ] prednisone for gout until 6/2

## 2022-05-31 NOTE — CONSULT NOTE ADULT - PROBLEM SELECTOR RECOMMENDATION 3
No barriers to implantation or transplantation identified. patient with strong social supports including wife who is retired RN, 2 sons and a daughter, one of which also lives with patient. He has a lot of siblings also close by. He has reliable transportation by his family for follow up appointments, and was able to identify risks/benefits of both advanced therapies through teach back method. He understands quality of life changes that can happen as a consequence as either or this interventions, and was able to communicate to this provider what quality of life he would not find acceptable in the event his conditioned worsened without realistic chance for recovery.  HCP form was completed today. Ongoing work up and management by primary team and heart failure.  We will sign off. Thank you for involving us in the care of this patient.

## 2022-05-31 NOTE — PROGRESS NOTE ADULT - PROBLEM SELECTOR PLAN 4
- S/p PCI in April 2022  - Per interventional cardiology daniela Ansari to discontinue plavix (last dose 5/28)  - C/w ASA 81mg daily and high intensity statin

## 2022-05-31 NOTE — PROGRESS NOTE ADULT - ASSESSMENT
Assessment/Plan:  65 y/o male w/ PMHx HTN, HLD, type 2 DM, chronic HFrEF (EF 25-30% by Echo), complete heart block s/p PPM (in 2006, upgraded to BiV-ICD in 09/2016), CAD (s/p recent BERNABE mid LAD at [facility] on 04/18/2022), and stage II CKD (baseline Cr ~1.3) presented with near syncope to OSH found to have 41 episodes of VT on device, s/p unsuccessful VT ablation and transferred to [facility] for management of cardiogenic shock and advanced therapy eval.     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  - SPO2 90s on RA     Cardiovascular  # Cardiogenic shock s/p RHC that shoed decreased CO/CI (2.3/1.3)   - TTE 5/21: LV 5.2 cm, LVEF 10-15%, LVOT VTI 10 cm, moderate RV dysfunction, mild AI, minimal MR  - [facility] 5/23: patent mLAD stent with slow flow, D1 with 40-50% stenosis  - RHC 5/26: RA 12, PA 70/40 (50), PCWP 22, Milana CO/CI 2.3/1.3, MAP 83 with SVR 2469, PVR 12 PERSAUD  - 5/27: RA 10, PA 76/35 (49), PCWP 34, PA sat 57% with Milana CO/CI 3.1/1.6, MAP 71 with SVR 1574, PVR 4.8  - Maintain Milrinone .5mcg/kg/min   - Undergoing VAD/OHT workup, Plan for CT coloUS elasto pending  - low threshold for escalation to IABP if hemodynamics worsen     # VTach   - s/p EPS on 5/24, unable to perform ablation as no substrate found and did not induce VT because of severely low EF   - Interrogation of ICD @Dr Wilson's office 5/20: back-to-back episodes of VT @ 200+ bpm all terminating with ATP. Since last cath pt has had 41 VT episodes (all falling into VF zone)  - Normal device function. BIV pacing 97%. No programming changes made  -. c/w Amiodarone 400 mg PO BID load, monitor LFTs until tomorrow, then 200mg daily   - BB held due to cardiogenic shock  - undergoing OHT/LVAD eval as above   - no further VT on inotropes     # CAD   - Chest pain free and compliant with DAPT since last PCI 4/18/22  - Cardiac cath @[facility] 4/18/22: mLAD 90% s/p BERNABE, LCx mild disease, RCA mild disease s/p diagnostic cardiac cath w/ Dr Wagner on 05/23/2022   - Cont ASA and Lipitor   - per discussion with interventional at OSH, ok to DC plavix at this time     GI  - No active issues  - DASH diet w/ 1500ml Fluid restriction  - Cont. PPI and bowel regimen, last BM 5/28  - Plan for CT colo tomorrow/ prep today    Renal  # JAGRUTI on CKD II resolved   -baseline 1.3   - Avoid nephrotoxic agents, NSAIDs. Renally dose meds.  - monitor strict IOs and continue current cardiac support     Heme/Onc  - No active issues  - HSQ for DVT ppx     Endo   # T2DM (A1C 6.2 on admission)  - Cont. ISS, glucose controlled, monitor FS closely now that pt is on steroids     MSK  # Gout flare, right knee   - continue renally dosed colchicine and allopurinol   - Day #3 of 5, prednisone 40mg     ID  # Febrile 5/28, cordis and swan discontinued  - infectious workup sent including RVP (negative), UA(negative), and BCx x2 from 5/28   - vanco x1 given  - trend fever and wbc    Lines  LR Irene (OSH 5/26) Assessment/Plan:  67 y/o male w/ PMHx HTN, HLD, type 2 DM, chronic HFrEF (EF 25-30% by Echo), complete heart block s/p PPM (in 2006, upgraded to BiV-ICD in 09/2016), CAD (s/p recent BERNABE mid LAD at [facility] on 04/18/2022), and stage II CKD (baseline Cr ~1.3) presented with near syncope to OSH found to have 41 episodes of VT on device, s/p unsuccessful VT ablation and transferred to [facility] for management of cardiogenic shock and advanced therapy eval.     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  - SPO2 90s on RA     Cardiovascular  # Cardiogenic shock s/p RHC that shoed decreased CO/CI (2.3/1.3)   - TTE 5/21: LV 5.2 cm, LVEF 10-15%, LVOT VTI 10 cm, moderate RV dysfunction, mild AI, minimal MR  - [facility] 5/23: patent mLAD stent with slow flow, D1 with 40-50% stenosis  - RHC 5/26: RA 12, PA 70/40 (50), PCWP 22, Milana CO/CI 2.3/1.3, MAP 83 with SVR 2469, PVR 12 PERSAUD  - 5/27: RA 10, PA 76/35 (49), PCWP 34, PA sat 57% with Milana CO/CI 3.1/1.6, MAP 71 with SVR 1574, PVR 4.8  - Maintain Milrinone .5mcg/kg/min   - Undergoing VAD/OHT workup, Plan for CT coloUS elasto pending  - low threshold for escalation to IABP if hemodynamics worsen     # VTach   - s/p EPS on 5/24, unable to perform ablation as no substrate found and did not induce VT because of severely low EF   - Normal device function. BIV pacing at 80bpm   -. c/w Amiodarone 400 mg PO BID load, monitor LFTs until tomorrow, then 200mg daily   - BB held due to cardiogenic shock  - undergoing OHT/LVAD eval as above   - no further VT on inotropes     # CAD   - Chest pain free and compliant with DAPT since last PCI 4/18/22  - Cardiac cath @[facility] 4/18/22: mLAD 90% s/p BERNABE, LCx mild disease, RCA mild disease s/p diagnostic cardiac cath w/ Dr Wagner on 05/23/2022   - Cont ASA and Lipitor   - per discussion with interventional at OSH, ok to DC plavix at this time     GI  - No active issues  - DASH diet w/ 1500ml Fluid restriction  - Cont. PPI and bowel regimen, last BM 5/28  - Plan for CT colo tomorrow/ prep today    Renal  # JAGRUTI on CKD II resolved   -baseline 1.3   - Avoid nephrotoxic agents, NSAIDs. Renally dose meds.  - monitor strict IOs and continue current cardiac support     Heme/Onc  - No active issues  - HSQ for DVT ppx     Endo   # T2DM (A1C 6.2 on admission)  - Cont. ISS, glucose controlled, monitor FS closely now that pt is on steroids     MSK  # Gout flare, right knee   - continue w/ allopurinol   - 5 days of prednisone 40mg until 6/2     ID  # Febrile 5/28, cordis and swan discontinued  - infectious workup sent including RVP (negative), UA(negative), and BCx x2 from 5/28   - vanco x1 given  - trend fever and wbc    Lines  LR Irene (OSH 5/26) Assessment/Plan:  67 y/o male w/ PMHx HTN, HLD, type 2 DM, chronic HFrEF (EF 25-30% by Echo), complete heart block s/p PPM (in 2006, upgraded to BiV-ICD in 09/2016), CAD (s/p recent BERNABE mid LAD at [facility] on 04/18/2022), and stage II CKD (baseline Cr ~1.3) presented with near syncope to OSH found to have 41 episodes of VT on device, s/p unsuccessful VT ablation and transferred to [facility] for management of cardiogenic shock and advanced therapy eval.     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  - SPO2 90s on RA     Cardiovascular  # Cardiogenic shock s/p RHC that shoed decreased CO/CI (2.3/1.3)   - TTE 5/21: LV 5.2 cm, LVEF 10-15%, LVOT VTI 10 cm, moderate RV dysfunction, mild AI, minimal MR  - C 5/23: patent mLAD stent with slow flow, D1 with 40-50% stenosis  - RHC 5/26: RA 12, PA 70/40 (50), PCWP 22, Milana CO/CI 2.3/1.3, MAP 83 with SVR 2469, PVR 12 PERSAUD  - 5/27: RA 10, PA 76/35 (49), PCWP 34, PA sat 57% with Milana CO/CI 3.1/1.6, MAP 71 with SVR 1574, PVR 4.8  - Maintain Milrinone .5mcg/kg/min   - Undergoing VAD/OHT workup, Plan for CT coloUS elasto pending  - low threshold for escalation to IABP if hemodynamics worsen   -HF to address spironolactone/other GDMT   -plan for RHC and IABP placement later this week     # VTach   - s/p EPS on 5/24, unable to perform ablation as no substrate found and did not induce VT because of severely low EF   - Normal device function. BIV pacing at 80bpm   -. c/w Amiodarone 400 mg PO BID load, monitor LFTs until tomorrow, then 200mg daily   - BB held due to cardiogenic shock  - undergoing OHT/LVAD eval as above   - no further VT on inotropes     # CAD   - Chest pain free and compliant with DAPT since last PCI 4/18/22  - Cardiac cath @[facility] 4/18/22: mLAD 90% s/p BERNABE, LCx mild disease, RCA mild disease s/p diagnostic cardiac cath w/ Dr Wagner on 05/23/2022   - Cont ASA and Lipitor   - per discussion with interventional at OSH, ok to DC plavix at this time     GI  - No active issues  - DASH diet w/ 1500ml Fluid restriction  - Cont. PPI and bowel regimen, last BM 5/28  - Plan for CT colo tomorrow/ prep today    Renal  # JAGRUTI on CKD II resolved   -baseline 1.3   - Avoid nephrotoxic agents, NSAIDs. Renally dose meds.  - monitor strict IOs and continue current cardiac support     Heme/Onc  - No active issues  - HSQ for DVT ppx     Endo   # T2DM (A1C 6.2 on admission)  - Cont. ISS, glucose controlled, monitor FS closely now that pt is on steroids     MSK  # Gout flare, right knee   - continue w/ allopurinol   - 5 days of prednisone 40mg until 6/2     ID  # Febrile 5/28, cordis and swan discontinued  - infectious workup sent including RVP (negative), UA(negative), and BCx x2 from 5/28   - vanco x1 given  - trend fever and wbc    Lines  LR Irene (OSH 5/26)

## 2022-05-31 NOTE — DIETITIAN INITIAL EVALUATION ADULT - NSFNSGIASSESSMENTFT_GEN_A_CORE
Pt denies recent N/V. Previously with constipation, then diarrhea. Now returning to baseline. Last BM 5/31.

## 2022-05-31 NOTE — PROGRESS NOTE ADULT - NS ATTEND AMEND GEN_ALL_CORE FT
Patient known to me from [facility]. ACC/AHA Stage D HF, inotrope dependent on milrinone 0.5 mcg/kg/min with normalization of renal function, but with persistent post-capillary PH and very high PCW. Ongoing LVAD/transplant evaluation. Blood type A. Would benefit from IABP to offload the LV and reduce the PAP if he is a transplant candidate. I would not feel safe sending him home given severe LV dysfunction, recent h/o persistent VT (40 episodes) requiring ATP and a negative EPS without substrate to ablate.    Had virtual colonoscopy today, results pending. Will review the rest of his eval as the team collects the data tomorrow.  Continue current therapies. LFTs rising on amiodarone and high intensity statin. Can watch for now, but may need to adjust therapy.  Consider addition of Toprol XL 25 mg once daily for h/o VT while on milrinone.  If he does not get a floor bed by tomorrow morning, would consider keeping him in CICU as he will get IABP if a transplant candidate (Thurs).

## 2022-05-31 NOTE — DIETITIAN INITIAL EVALUATION ADULT - PERTINENT MEDS FT
MEDICATIONS  (STANDING):  allopurinol 75 milliGRAM(s) Oral daily  aMIOdarone    Tablet 400 milliGRAM(s) Oral two times a day  aspirin enteric coated 81 milliGRAM(s) Oral daily  atorvastatin 80 milliGRAM(s) Oral at bedtime  chlorhexidine 4% Liquid 1 Application(s) Topical <User Schedule>  heparin   Injectable 5000 Unit(s) SubCutaneous every 8 hours  hydrALAZINE 25 milliGRAM(s) Oral every 8 hours  influenza   Vaccine 0.5 milliLiter(s) IntraMuscular once  insulin lispro (ADMELOG) corrective regimen sliding scale   SubCutaneous three times a day before meals  insulin lispro (ADMELOG) corrective regimen sliding scale   SubCutaneous at bedtime  lidocaine   4% Patch 1 Patch Transdermal daily  milrinone Infusion 0.5 MICROgram(s)/kG/Min (11 mL/Hr) IV Continuous <Continuous>  pantoprazole    Tablet 40 milliGRAM(s) Oral before breakfast  polyethylene glycol 3350 17 Gram(s) Oral two times a day  predniSONE   Tablet 40 milliGRAM(s) Oral daily  senna 2 Tablet(s) Oral at bedtime    MEDICATIONS  (PRN):

## 2022-05-31 NOTE — DIETITIAN INITIAL EVALUATION ADULT - PERSON TAUGHT/METHOD
In setting of LVAD/Heart Transplant evaluation, RD reviewed: food safety rules, storing/cooking temperatures, cross contamination, temperature safety zone, expiration dates, avoiding buffets and tips for eating out, signs/symptoms of food borne illness, food and immunosuppressive drug interactions (prednisone, tacrolimus, cellcept), importance of blood glucose control and potassium control while on prednisone/tacrolimus, importance of avoiding grapefruit/pomegranate/sour oranges, coumadin-vitamin K drug-nutrient interaction and need for consistent intake of vitamin K while taking this medication. Pt was receptive to information and was able to use teach back points to verbalize understanding. Pt accepted written materials on discussed topics./verbal instruction/written material/patient instructed

## 2022-05-31 NOTE — DIETITIAN INITIAL EVALUATION ADULT - PERTINENT LABORATORY DATA
05-31    134<L>  |  101  |  22  ----------------------------<  113<H>  5.4<H>   |  22  |  1.12    Ca    9.6      31 May 2022 05:00  Phos  2.9     05-31  Mg     2.4     05-31    TPro  7.3  /  Alb  3.3  /  TBili  1.0  /  DBili  x   /  AST  34  /  ALT  28  /  AlkPhos  60  05-31  POCT Blood Glucose.: 137 mg/dL (05-31-22 @ 11:52)  A1C with Estimated Average Glucose Result: 6.2 % (05-20-22 @ 14:17)  A1C with Estimated Average Glucose Result: 6.9 % (04-18-22 @ 15:40)  A1C with Estimated Average Glucose Result: 6.7 % (04-18-22 @ 15:05)   05-31    134<L>  |  101  |  22  ----------------------------<  113<H>  5.4<H>   |  22  |  1.12    Ca    9.6      31 May 2022 05:00  Phos  2.9     05-31  Mg     2.4     05-31    TPro  7.3  /  Alb  3.3  /  TBili  1.0  /  DBili  x   /  AST  34  /  ALT  28  /  AlkPhos  60  05-31  POCT Blood Glucose.: 137 mg/dL (05-31-22 @ 11:52)  A1C with Estimated Average Glucose Result: 6.2 % (05-20-22 @ 14:17)  A1C with Estimated Average Glucose Result: 6.9 % (04-18-22 @ 15:40)  A1C with Estimated Average Glucose Result: 6.7 % (04-18-22 @ 15:05)

## 2022-06-01 DIAGNOSIS — Z87.891 PERSONAL HISTORY OF NICOTINE DEPENDENCE: ICD-10-CM

## 2022-06-01 DIAGNOSIS — N17.9 ACUTE KIDNEY FAILURE, UNSPECIFIED: ICD-10-CM

## 2022-06-01 DIAGNOSIS — I25.10 ATHEROSCLEROTIC HEART DISEASE OF NATIVE CORONARY ARTERY WITHOUT ANGINA PECTORIS: ICD-10-CM

## 2022-06-01 DIAGNOSIS — E11.9 TYPE 2 DIABETES MELLITUS WITHOUT COMPLICATIONS: ICD-10-CM

## 2022-06-01 DIAGNOSIS — I50.23 ACUTE ON CHRONIC SYSTOLIC (CONGESTIVE) HEART FAILURE: ICD-10-CM

## 2022-06-01 DIAGNOSIS — M10.9 GOUT, UNSPECIFIED: ICD-10-CM

## 2022-06-01 DIAGNOSIS — Z82.49 FAMILY HISTORY OF ISCHEMIC HEART DISEASE AND OTHER DISEASES OF THE CIRCULATORY SYSTEM: ICD-10-CM

## 2022-06-01 DIAGNOSIS — Z95.810 PRESENCE OF AUTOMATIC (IMPLANTABLE) CARDIAC DEFIBRILLATOR: ICD-10-CM

## 2022-06-01 DIAGNOSIS — E78.5 HYPERLIPIDEMIA, UNSPECIFIED: ICD-10-CM

## 2022-06-01 DIAGNOSIS — Z95.5 PRESENCE OF CORONARY ANGIOPLASTY IMPLANT AND GRAFT: ICD-10-CM

## 2022-06-01 DIAGNOSIS — I47.2 VENTRICULAR TACHYCARDIA: ICD-10-CM

## 2022-06-01 DIAGNOSIS — I44.2 ATRIOVENTRICULAR BLOCK, COMPLETE: ICD-10-CM

## 2022-06-01 DIAGNOSIS — R57.0 CARDIOGENIC SHOCK: ICD-10-CM

## 2022-06-01 DIAGNOSIS — Z79.82 LONG TERM (CURRENT) USE OF ASPIRIN: ICD-10-CM

## 2022-06-01 DIAGNOSIS — Z29.9 ENCOUNTER FOR PROPHYLACTIC MEASURES, UNSPECIFIED: ICD-10-CM

## 2022-06-01 DIAGNOSIS — I35.8 OTHER NONRHEUMATIC AORTIC VALVE DISORDERS: ICD-10-CM

## 2022-06-01 DIAGNOSIS — N18.30 CHRONIC KIDNEY DISEASE, STAGE 3 UNSPECIFIED: ICD-10-CM

## 2022-06-01 DIAGNOSIS — E11.22 TYPE 2 DIABETES MELLITUS WITH DIABETIC CHRONIC KIDNEY DISEASE: ICD-10-CM

## 2022-06-01 DIAGNOSIS — I42.8 OTHER CARDIOMYOPATHIES: ICD-10-CM

## 2022-06-01 DIAGNOSIS — Z53.09 PROCEDURE AND TREATMENT NOT CARRIED OUT BECAUSE OF OTHER CONTRAINDICATION: ICD-10-CM

## 2022-06-01 DIAGNOSIS — I27.20 PULMONARY HYPERTENSION, UNSPECIFIED: ICD-10-CM

## 2022-06-01 DIAGNOSIS — Z79.02 LONG TERM (CURRENT) USE OF ANTITHROMBOTICS/ANTIPLATELETS: ICD-10-CM

## 2022-06-01 DIAGNOSIS — I25.2 OLD MYOCARDIAL INFARCTION: ICD-10-CM

## 2022-06-01 DIAGNOSIS — I13.0 HYPERTENSIVE HEART AND CHRONIC KIDNEY DISEASE WITH HEART FAILURE AND STAGE 1 THROUGH STAGE 4 CHRONIC KIDNEY DISEASE, OR UNSPECIFIED CHRONIC KIDNEY DISEASE: ICD-10-CM

## 2022-06-01 LAB
ALBUMIN SERPL ELPH-MCNC: 4.1 G/DL — SIGNIFICANT CHANGE UP (ref 3.3–5)
ALP SERPL-CCNC: 74 U/L — SIGNIFICANT CHANGE UP (ref 40–120)
ALT FLD-CCNC: 45 U/L — SIGNIFICANT CHANGE UP (ref 10–45)
ANA PAT FLD IF-IMP: ABNORMAL
ANA TITR SER: ABNORMAL
ANABASINE UR-MCNC: <2 NG/ML — SIGNIFICANT CHANGE UP
ANION GAP SERPL CALC-SCNC: 16 MMOL/L — SIGNIFICANT CHANGE UP (ref 5–17)
AST SERPL-CCNC: 40 U/L — SIGNIFICANT CHANGE UP (ref 10–40)
BILIRUB SERPL-MCNC: 0.8 MG/DL — SIGNIFICANT CHANGE UP (ref 0.2–1.2)
BUN SERPL-MCNC: 34 MG/DL — HIGH (ref 7–23)
CALCIUM SERPL-MCNC: 9.8 MG/DL — SIGNIFICANT CHANGE UP (ref 8.4–10.5)
CHLORIDE SERPL-SCNC: 99 MMOL/L — SIGNIFICANT CHANGE UP (ref 96–108)
CO2 SERPL-SCNC: 22 MMOL/L — SIGNIFICANT CHANGE UP (ref 22–31)
COTININE UR-MCNC: <5 NG/ML — SIGNIFICANT CHANGE UP
CREAT SERPL-MCNC: 1.28 MG/DL — SIGNIFICANT CHANGE UP (ref 0.5–1.3)
EGFR: 62 ML/MIN/1.73M2 — SIGNIFICANT CHANGE UP
GLUCOSE BLDC GLUCOMTR-MCNC: 116 MG/DL — HIGH (ref 70–99)
GLUCOSE BLDC GLUCOMTR-MCNC: 143 MG/DL — HIGH (ref 70–99)
GLUCOSE BLDC GLUCOMTR-MCNC: 149 MG/DL — HIGH (ref 70–99)
GLUCOSE BLDC GLUCOMTR-MCNC: 165 MG/DL — HIGH (ref 70–99)
GLUCOSE SERPL-MCNC: 176 MG/DL — HIGH (ref 70–99)
HCT VFR BLD CALC: 50.6 % — HIGH (ref 39–50)
HEV AB FLD QL: NEGATIVE — SIGNIFICANT CHANGE UP
HGB BLD-MCNC: 16.3 G/DL — SIGNIFICANT CHANGE UP (ref 13–17)
MCHC RBC-ENTMCNC: 30.1 PG — SIGNIFICANT CHANGE UP (ref 27–34)
MCHC RBC-ENTMCNC: 32.2 GM/DL — SIGNIFICANT CHANGE UP (ref 32–36)
MCV RBC AUTO: 93.4 FL — SIGNIFICANT CHANGE UP (ref 80–100)
NICOTINE UR-MCNC: <5 NG/ML — SIGNIFICANT CHANGE UP
NORNICOTINE UR-MCNC: <2 NG/ML — SIGNIFICANT CHANGE UP
NRBC # BLD: 0 /100 WBCS — SIGNIFICANT CHANGE UP (ref 0–0)
PLATELET # BLD AUTO: 311 K/UL — SIGNIFICANT CHANGE UP (ref 150–400)
POTASSIUM SERPL-MCNC: 4.4 MMOL/L — SIGNIFICANT CHANGE UP (ref 3.5–5.3)
POTASSIUM SERPL-SCNC: 4.4 MMOL/L — SIGNIFICANT CHANGE UP (ref 3.5–5.3)
PROT SERPL-MCNC: 7.8 G/DL — SIGNIFICANT CHANGE UP (ref 6–8.3)
RBC # BLD: 5.42 M/UL — SIGNIFICANT CHANGE UP (ref 4.2–5.8)
RBC # FLD: 15.9 % — HIGH (ref 10.3–14.5)
SODIUM SERPL-SCNC: 137 MMOL/L — SIGNIFICANT CHANGE UP (ref 135–145)
WBC # BLD: 14.28 K/UL — HIGH (ref 3.8–10.5)
WBC # FLD AUTO: 14.28 K/UL — HIGH (ref 3.8–10.5)

## 2022-06-01 PROCEDURE — 90791 PSYCH DIAGNOSTIC EVALUATION: CPT

## 2022-06-01 PROCEDURE — 99233 SBSQ HOSP IP/OBS HIGH 50: CPT

## 2022-06-01 PROCEDURE — 99222 1ST HOSP IP/OBS MODERATE 55: CPT

## 2022-06-01 PROCEDURE — 99222 1ST HOSP IP/OBS MODERATE 55: CPT | Mod: GC

## 2022-06-01 PROCEDURE — 99223 1ST HOSP IP/OBS HIGH 75: CPT | Mod: GC

## 2022-06-01 PROCEDURE — 76981 USE PARENCHYMA: CPT | Mod: 26

## 2022-06-01 RX ORDER — METOPROLOL TARTRATE 50 MG
25 TABLET ORAL ONCE
Refills: 0 | Status: COMPLETED | OUTPATIENT
Start: 2022-06-01 | End: 2022-06-01

## 2022-06-01 RX ORDER — METOPROLOL TARTRATE 50 MG
25 TABLET ORAL DAILY
Refills: 0 | Status: DISCONTINUED | OUTPATIENT
Start: 2022-06-02 | End: 2022-06-05

## 2022-06-01 RX ORDER — METOPROLOL TARTRATE 50 MG
TABLET ORAL
Refills: 0 | Status: DISCONTINUED | OUTPATIENT
Start: 2022-06-01 | End: 2022-06-05

## 2022-06-01 RX ORDER — ALLOPURINOL 300 MG
100 TABLET ORAL DAILY
Refills: 0 | Status: DISCONTINUED | OUTPATIENT
Start: 2022-06-01 | End: 2022-06-21

## 2022-06-01 RX ORDER — CHOLECALCIFEROL (VITAMIN D3) 125 MCG
1 CAPSULE ORAL
Qty: 0 | Refills: 0 | DISCHARGE

## 2022-06-01 RX ADMIN — POLYETHYLENE GLYCOL 3350 17 GRAM(S): 17 POWDER, FOR SOLUTION ORAL at 18:01

## 2022-06-01 RX ADMIN — CHLORHEXIDINE GLUCONATE 1 APPLICATION(S): 213 SOLUTION TOPICAL at 08:40

## 2022-06-01 RX ADMIN — HEPARIN SODIUM 5000 UNIT(S): 5000 INJECTION INTRAVENOUS; SUBCUTANEOUS at 22:01

## 2022-06-01 RX ADMIN — MILRINONE LACTATE 11 MICROGRAM(S)/KG/MIN: 1 INJECTION, SOLUTION INTRAVENOUS at 20:22

## 2022-06-01 RX ADMIN — MILRINONE LACTATE 11 MICROGRAM(S)/KG/MIN: 1 INJECTION, SOLUTION INTRAVENOUS at 08:44

## 2022-06-01 RX ADMIN — Medication 25 MILLIGRAM(S): at 12:00

## 2022-06-01 RX ADMIN — Medication 25 MILLIGRAM(S): at 05:03

## 2022-06-01 RX ADMIN — AMIODARONE HYDROCHLORIDE 400 MILLIGRAM(S): 400 TABLET ORAL at 05:02

## 2022-06-01 RX ADMIN — Medication 100 MILLIGRAM(S): at 12:01

## 2022-06-01 RX ADMIN — Medication 40 MILLIGRAM(S): at 05:02

## 2022-06-01 RX ADMIN — PANTOPRAZOLE SODIUM 40 MILLIGRAM(S): 20 TABLET, DELAYED RELEASE ORAL at 05:03

## 2022-06-01 RX ADMIN — HEPARIN SODIUM 5000 UNIT(S): 5000 INJECTION INTRAVENOUS; SUBCUTANEOUS at 05:03

## 2022-06-01 RX ADMIN — ATORVASTATIN CALCIUM 80 MILLIGRAM(S): 80 TABLET, FILM COATED ORAL at 22:01

## 2022-06-01 RX ADMIN — AMIODARONE HYDROCHLORIDE 400 MILLIGRAM(S): 400 TABLET ORAL at 18:01

## 2022-06-01 RX ADMIN — Medication 25 MILLIGRAM(S): at 22:01

## 2022-06-01 NOTE — PROGRESS NOTE ADULT - PROBLEM SELECTOR PLAN 1
remains inotrope dependent on milrinone  off BB for now, cw hydral 25 tid, GDMT on hold pending improvement of hemodynamics  continue amio  undergoing eval for OHT and LVAD

## 2022-06-01 NOTE — CONSULT NOTE ADULT - ASSESSMENT
67 y/o Citizen of the [place] M PMHx mixed Ischemic/NICM cardiomyopathy (EF 25-30% at baseline, s/p BIV-ICD) and CAD, initially presented to [facility] with near syncope, found to intermittent episodes of VT and admitted to cardiac telemetry for further evaluation/management. S/p unsuccessful VT ablation. Now transferred to [facility] CCU for advanced therapies evaluation. S/p [place] placement in CCU, course c/b fever on 5/28, s/p subsequent [place] removal. Blood cultures no growth, no further fevers.     Last fever 100.6 (5/28)  Blood Cultures (5/28) no growth  Urinalysis unremarkable  CT Chest (5/28) mild pulmonary edema    Immunizations:  COVID Pfizer x2 (10/2021)    Pre-Transplant Labs:  HAV IgG+  HBsAb-, HBsAg-, HBcAb-   HCV Ab-  HSV 1/2 IgG +/-  EBV negative  CMV IgG+  Toxoplasma IgG-  VZV IgG+  Measles IgG+  Mumps IgG+   Rubella IgG-  Quantiferon Gold negative  HIV Test negative  Syphilis Screen negative  Strongyloides Ab negative    Impression:  #Fever - in setting of central line, now resolved s/p removal. Blood cultures no growth  #Leukocytosis - possibly steroid-related  #Pre-Advanced Therapies Eval    Recs:  -      65 y/o Citizen of [place] M PMHx mixed Ischemic/NICM cardiomyopathy (EF 25-30% at baseline, s/p BIV-ICD) and CAD, initially presented to [facility] with near syncope, found to intermittent episodes of VT and admitted to cardiac telemetry for further evaluation/management. S/p unsuccessful VT ablation. Now transferred to [facility] CCU for advanced therapies evaluation. S/p [place] placement in CCU, course c/b fever on 5/28, s/p subsequent [place] removal. Blood cultures no growth, no further fevers.     Last fever 100.6 (5/28)  Blood Cultures (5/28) no growth  Urinalysis unremarkable  CT Chest (5/28) mild pulmonary edema    Immunizations:  COVID Pfizer x2 (10/2021)    Pre-Transplant Labs:  HAV IgG+  HBsAb-, HBsAg-, HBcAb-   HCV Ab-  HSV 1/2 IgG +/-  EBV negative  CMV IgG+  Toxoplasma IgG-  VZV IgG+  Measles IgG+  Mumps IgG+   Rubella IgG-  Quantiferon Gold negative  HIV Test negative  Syphilis Screen negative  Strongyloides Ab negative    Impression:  #Fever - in setting of central line, now resolved s/p removal. Blood cultures no growth  #Leukocytosis - steroid-related  #Pre-Advanced Therapies Eval    Recs:  - no evidence of active infection  - no ID contraindication with proceeding with transplant listing  - will need first dose of hep B vaccine (Heplisav)      Sher Harris MD  Infectious Disease Fellow  Available on [employer] Teams  Before 9AM or after 5PM: [phone]

## 2022-06-01 NOTE — PROGRESS NOTE ADULT - ASSESSMENT
67 y/o male w/ PMHx HTN, HLD, type 2 DM, HFrEF (EF 15%), complete heart block s/p PPM (in 2006, upgraded to BiV-ICD in 09/2016), CAD (s/p recent BERNABE mid LAD at [facility] on 04/18/2022), and stage II CKD (baseline Cr ~1.3) presented with near syncope to OSH found to have 41 episodes of VT on device, s/p unsuccessful VT ablation and transferred to [facility] for management of cardiogenic shock and advanced therapy eval.

## 2022-06-01 NOTE — CONSULT NOTE ADULT - SUBJECTIVE AND OBJECTIVE BOX
HPI:  LENNOX GOCOOL is a 66 year old male with history of mixed Ischemic/NICM Cardiomyopathy (EF 25-30% at baseline, s/p BIV-ICD), CAD (medically managed MIs in ,, most recent stent in 2022 to mLAD) presented with multiple near syncope, found to have 41 episodes of VT and admitted initially to cardiac telemetry for further evaluation/management.     Ischemic evaluation without new disease and VT ablation without substrate to complete ablation. Transferred from [facility] for further management and evaluation for LVAD vs OHT.  Cardiology planned to list for transplant.  Underwent virtual colonoscopy with inadequate visualization of left colon, however no other findings noted throughout.  Never had prior colonoscopy.  Denies overt bleeding.    ROS:   General:  No fevers, chills, night sweats, fatigue  Eyes:  Good vision, no reported pain  ENT:  No sore throat, pain, runny nose  CV:  No pain, palpitations  Pulm:  No dyspnea, cough  GI:  See HPI, otherwise negative  :  No  incontinence, nocturia  Muscle:  No pain, weakness  Neuro:  No memory problems  Psych:  No insomnia, mood problems, depression  Endocrine:  No polyuria, polydipsia, cold/heat intolerance  Heme:  No petechiae, ecchymosis, easy bruisability  Skin:  No rash    PMHX/PSHX:    AV block    Essential hypertension    Pure hypercholesterolemia    Myocardial infarct, old    Chronic HFrEF (heart failure with reduced ejection fraction)    Chronic kidney disease, unspecified CKD stage    CAD (coronary artery disease)    HLD (hyperlipidemia)    Type 2 diabetes mellitus    Artificial cardiac pacemaker      Allergies:  No Known Allergies      Home Medications: reviewed  Hospital Medications:  allopurinol 100 milliGRAM(s) Oral daily  aMIOdarone    Tablet 400 milliGRAM(s) Oral two times a day  aspirin enteric coated 81 milliGRAM(s) Oral daily  atorvastatin 80 milliGRAM(s) Oral at bedtime  chlorhexidine 4% Liquid 1 Application(s) Topical <User Schedule>  heparin   Injectable 5000 Unit(s) SubCutaneous every 8 hours  hydrALAZINE 25 milliGRAM(s) Oral every 8 hours  influenza   Vaccine 0.5 milliLiter(s) IntraMuscular once  insulin lispro (ADMELOG) corrective regimen sliding scale   SubCutaneous three times a day before meals  insulin lispro (ADMELOG) corrective regimen sliding scale   SubCutaneous at bedtime  lidocaine   4% Patch 1 Patch Transdermal daily  metoprolol succinate ER      milrinone Infusion 0.5 MICROgram(s)/kG/Min IV Continuous <Continuous>  pantoprazole    Tablet 40 milliGRAM(s) Oral before breakfast  polyethylene glycol 3350 17 Gram(s) Oral two times a day  predniSONE   Tablet 40 milliGRAM(s) Oral daily  senna 2 Tablet(s) Oral at bedtime      Social History:   Tobacco: denies  Alcohol: denies  Recreational drugs: denies    Family history:    Family history of acute myocardial infarction (Father)      Denies family history of colon cancer/polyps, stomach cancer/polyps, pancreatic cancer/masses, liver cancer/disease, ovarian cancer and endometrial cancer.    PHYSICAL EXAM:   Vital Signs:  Vital Signs Last 24 Hrs  T(C): 36.7 (2022 11:26), Max: 36.7 (2022 11:)  T(F): 98.1 (:), Max: 98.1 (2022 11:)  HR: 80 (2022 11:26) (80 - 81)  BP: 109/74 (2022 11:) (108/71 - 115/74)  BP(mean): 89 (31 May 2022 18:50) (89 - 89)  RR: 18 (2022 11:26) (16 - 18)  SpO2: 98% (2022 11:26) (96% - 98%)  Daily     Daily Weight in k.2 (2022 07:00)    GENERAL: no acute distress  NEURO: alert  HEENT: NCAT, no conjunctival pallor appreciated  CHEST: no respiratory distress, no accessory muscle use  CARDIAC: regular rate, +S1/S2  ABDOMEN: soft, nondistended, nontender, no rebound or guarding  EXTREMITIES: warm, well perfused  SKIN: no lesions noted    LABS: reviewed                        16.3   14.28 )-----------( 311      ( 2022 11:50 )             50.6     06-01    137  |  99  |  34<H>  ----------------------------<  176<H>  4.4   |  22  |  1.28    Ca    9.8      2022 11:50  Phos  2.9     05-31  Mg     2.4     05-31    TPro  7.8  /  Alb  4.1  /  TBili  0.8  /  DBili  x   /  AST  40  /  ALT  45  /  AlkPhos  74  06-01    LIVER FUNCTIONS - ( 2022 11:50 )  Alb: 4.1 g/dL / Pro: 7.8 g/dL / ALK PHOS: 74 U/L / ALT: 45 U/L / AST: 40 U/L / GGT: x               Diagnostic Studies: see sunrise for full report

## 2022-06-01 NOTE — PROGRESS NOTE ADULT - PROBLEM SELECTOR PLAN 1
- C/w milrinone to 0.5 mcg/kg/min  - CI/CO improved after AV pacing rate increased to 80  - start Toprol XL 25 mg PO QD   - Continue hydralizine to 25 mg TID   - PAC/cordis discontinued 05/29 given fever   - Pending advanced therapies eval for LVAD/transplant (ABO A, PRA 0%), needs colonoscopy (virtual pending tomorrow). Of note he had within a 24 hour span on 5/27-5/28 a SBP < 90, PCWP > 20, and CI of 1.7 on inotropes  - currently undergoing advance therapy eval, plan to present to the selection committee this upcoming Thursday

## 2022-06-01 NOTE — PROGRESS NOTE ADULT - PROBLEM SELECTOR PLAN 4
- S/p PCI in April 2022  - Per interventional cardiology daniela Ansari to discontinue plavix (last dose 5/28)  - C/w ASA 81mg daily and high intensity statin room air

## 2022-06-01 NOTE — PROGRESS NOTE ADULT - SUBJECTIVE AND OBJECTIVE BOX
Behavioral Cardiology Progress Note    History of present illness: Mr. Du is a 66-year-old male, PMH of HTN, HLD, type 2 DM, chronic HFrEF (25-30%), complete heart block s/p PPM, BiV-ICD, CAD (s/p recent BERNABE mid LAD at [facility] on 2022), and stage II CKD, admitted for intermittent vtach, now s/p unsuccessful VT ablation. Patient transferred from [facility] to [facility] for further management and evaluation for LVAD vs. OHT.     Social history: Patient lives with his wife (Teresa, 66 y/o, [phone]) and 24 y/o daughter (Priyanka) in [place], [place], in private home. Patient has two other adult sons (Philip, age 35; and Aldo, age 30) who live in [place] and [place]. Patient’s mother  in her 80s of natural causes and father  in his 80s from a heart attack. Patient has four siblings who live in the area and who he is close to. Patient is a citizen, [place] from [facility] approximately 40 years ago. 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 Education: high school.    Substance use:   Tobacco: Former smoker, approximately 1-2 cigarettes/day from age 21 until 2006 at time of his heart block. No use since that time.    Alcohol: Denies current alcohol use. Last time he used alcohol was prior to 2021. Before that time, he used to drink approximately 4 beers at a time, 2-3 times/week.   Drug: Denies current or past substance use.

## 2022-06-01 NOTE — PROGRESS NOTE ADULT - NS ATTEND AMEND GEN_ALL_CORE FT
Patient known to me from [facility]. ACC/AHA Stage D HF, inotrope dependent on milrinone 0.5 mcg/kg/min with normalization of renal function, but with persistent post-capillary PH and very high PCW. Ongoing LVAD/transplant evaluation. Blood type A. Would benefit from IABP to offload the LV and reduce the PAP if he is a transplant candidate. I would not feel safe sending him home given severe LV dysfunction, recent h/o persistent VT (40 episodes) requiring ATP and a negative EPS without substrate to ablate.    Virtual colonoscopy not sufficient to evaluate left colon. Will need GI to rescope him on Friday AM as he ate today.  Labs were pending at the time of our visit.  Add Toprol XL 25 mg once daily for h/o VT while on milrinone.    Had a family meeting with his wife and 3 children at bedside (oldest son by phone) to explain the severity of his condition and the need to remain in hospital. The patient would like his wife and oldest son to be his HCP. Will request Transplant SW to assist with these forms.

## 2022-06-01 NOTE — PROGRESS NOTE ADULT - PROBLEM SELECTOR PLAN 5
- hx of VT s/p unsuccessful VT ablation  - remains on amio 400 mg PO BID  - since being admitted to [facility] has not had any episodes of VT, currently tolerating high dose milrinone.

## 2022-06-01 NOTE — PROGRESS NOTE ADULT - PROBLEM SELECTOR PLAN 7
- 100.6 fever on 5/28, cultures NGTD  - leukocytosis was noted to rise today, will continue to monitor  - Transplant ID continued, appreciate recommendations

## 2022-06-01 NOTE — PHARMACOTHERAPY INTERVENTION NOTE - COMMENTS
Confirmed home medications with patient and pharmacy and medical record from [facility], updated in Outpatient Medication Review.     Esdras Magallanes, PharmD Candidate Confirmed home medications with patient, pharmacy and medical record from [facility], updated in Outpatient Medication Review.     Esdras Magallanes, PharmD Candidate    Isabel KhanD, [facility]  Clinical Pharmacy Specialist  [phone] or Teams

## 2022-06-01 NOTE — PROGRESS NOTE ADULT - ASSESSMENT
Past psychiatric history: Denied.      Psychological assessment: Reports some anxiety related to discussions related to need for advanced therapies. Feels he is in the information gathering period and wants to first see if the team thinks he is a candidate before deciding how to proceed. Plans to talk things over with his wife (who is a retired RN) and other family members. Expressed concerns about living with LVAD related to carrying external equipment, having a driveline, and weight of batteries and controller. Endorsed mild symptoms of anxiety related to current stressors. Rohini by talking with his wife and praying. Appetite good. Denies SI/HI. Receptive to supportive therapy.   Mental Status Exam: Seen sitting in chair on 2 DSU. Alert, oriented x4. Appropriate eye contact. Speech normal volume and rate. Thought process was logical and goal-oriented, content appropriate to conversation. No evidence of jack, delusions, psychosis. Denies SI/HI. Mood is euthymic. Affect is full range. Insight into disease is good. Immediate judgment good.      Dx: Adjustment disorder, unspecified. F43.20. Systolic heart failure, I50.2.    Recommendations:   Behavioral Cardiology will follow as needed.    service  Would benefit from repeat memory assessment  Needs additional education on heart failure guidelines, as well as LVAD and HT  Would benefit from involving family members in communication regarding medical status and recommendations  Patient would benefit from continued involvement from behavioral cardiology      30 minutes spent on patient encounter

## 2022-06-01 NOTE — CONSULT NOTE ADULT - SUBJECTIVE AND OBJECTIVE BOX
Patient is a 66y old  Male who presents with a chief complaint of LVAD/OHT eval (01 Jun 2022 12:36)    HPI:  66M Mixed Ischemic/NICM Cardiomyopathy (EF 25-30% at baseline, s/p BIV-ICD), CAD (medically managed MIs in 2008,2011, Most recent stent in April 2022 to mLAD) presented with multiple near syncope, found to have 41 episodes of VT and admitted initially to cardiac telemetry for further evaluation/management. Ischemic evaluation without new disease and VT ablation without substrate to complete ablation.   Transferred from [facility] for further management and evaluation for LVAD vs OHT.    (26 May 2022 20:31)     Born in [facility], moved to  40 years ago. 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    REVIEW OF SYSTEMS  [  ] ROS unobtainable because:    [ X ] All other systems negative except as noted below    Constitutional:  [ ] fever [ ] chills  [ ] weight loss  [ ]night sweat  [ ]poor appetite/PO intake [ ]fatigue   Skin:  [ ] rash [ ] phlebitis	  Eyes: [ ] icterus [ ] pain  [ ] discharge	  ENMT: [ ] sore throat  [ ] thrush [ ] ulcers [ ] exudates [ ]anosmia  Respiratory: [ ] dyspnea [ ] hemoptysis [ ] cough [ ] sputum	  Cardiovascular:  [ ] chest pain [ ] palpitations [ ] edema	  Gastrointestinal:  [ ] nausea [ ] vomiting [ ] diarrhea [ ] constipation [ ] pain	  Genitourinary:  [ ] dysuria [ ] frequency [ ] hematuria [ ] discharge [ ] flank pain  [ ] incontinence  Musculoskeletal:  [ ] myalgias [ ] arthralgias [ ] arthritis  [ ] back pain  Neurological:  [ ] headache [ ] weakness [ ] seizures  [ ] confusion/altered mental status    prior hospital charts reviewed [V]  primary team notes reviewed [V]  other consultant notes reviewed [V]    PAST MEDICAL & SURGICAL HISTORY:  AV block    Essential hypertension    Chronic HFrEF (heart failure with reduced ejection fraction)    Chronic kidney disease, unspecified CKD stage    CAD (coronary artery disease)    HLD (hyperlipidemia)    Type 2 diabetes mellitus    Artificial cardiac pacemaker      FAMILY HISTORY:  Family history of acute myocardial infarction (Father)  72    SOCIAL HISTORY:  Born in [facility], moved to  40 years ago. 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      Allergies  No Known Allergies        ANTIMICROBIALS:      ANTIMICROBIALS (past 90 days):   MEDICATIONS  (STANDING):  vancomycin  IVPB   250 mL/Hr IV Intermittent (05-28-22 @ 20:55)        MEDICATIONS  (STANDING):  allopurinol 100 daily  aMIOdarone    Tablet 400 two times a day  aspirin enteric coated 81 daily  atorvastatin 80 at bedtime  heparin   Injectable 5000 every 8 hours  hydrALAZINE 25 every 8 hours  influenza   Vaccine 0.5 once  insulin lispro (ADMELOG) corrective regimen sliding scale  three times a day before meals  insulin lispro (ADMELOG) corrective regimen sliding scale  at bedtime  metoprolol succinate ER    milrinone Infusion 0.5 <Continuous>  pantoprazole    Tablet 40 before breakfast  polyethylene glycol 3350 17 two times a day  predniSONE   Tablet 40 daily  senna 2 at bedtime      VITALS:  Vital Signs Last 24 Hrs  T(F): 98.1 (06-01-22 @ 11:26), Max: 100.6 (05-28-22 @ 18:00)  Vital Signs Last 24 Hrs  HR: 80 (06-01-22 @ 11:26) (80 - 101)  BP: 109/74 (06-01-22 @ 11:26) (101/65 - 115/74)  RR: 18 (06-01-22 @ 11:26)  SpO2: 98% (06-01-22 @ 11:26) (93% - 98%)  Wt(kg): --    PHYSICAL EXAM:  Constitutional: non-toxic, no distress  HEAD/EYES: anicteric, no conjunctival injection  ENT:  supple, no thrush  Cardiovascular:   +S1/S2  Respiratory:  +BS bilaterally  GI:  soft, non-tender, +bowel sounds  :  no block, no CVA tenderness  Musculoskeletal:  no synovitis, normal ROM  Neurologic: awake and alert,  no focal findings  Skin:  no rash, no erythema, no phlebitis  Heme/Onc: no lymphadenopathy   Psychiatric:  awake, alert, appropriate mood      Labs:                        16.3   14.28 )-----------( 311      ( 01 Jun 2022 11:50 )             50.6     06-01    137  |  99  |  34<H>  ----------------------------<  176<H>  4.4   |  22  |  1.28    Ca    9.8      01 Jun 2022 11:50  Phos  2.9     05-31  Mg     2.4     05-31    TPro  7.8  /  Alb  4.1  /  TBili  0.8  /  DBili  x   /  AST  40  /  ALT  45  /  AlkPhos  74  06-01      WBC Trend:  WBC Count: 14.28 (06-01-22 @ 11:50)  WBC Count: 10.25 (05-31-22 @ 05:00)  WBC Count: 14.92 (05-30-22 @ 04:47)  WBC Count: 11.90 (05-29-22 @ 05:07)    Auto Neutrophil #: 7.39 K/uL (05-31-22 @ 05:00)  Auto Neutrophil #: 10.45 K/uL (05-30-22 @ 04:47)  Auto Neutrophil #: 9.01 K/uL (05-29-22 @ 05:07)  Auto Neutrophil #: 8.79 K/uL (05-28-22 @ 22:41)  Auto Neutrophil #: 6.67 K/uL (05-28-22 @ 04:41)    Auto Eosinophil %: 0.0 % (05-31-22 @ 05:00)  Auto Eosinophil %: 0.9 % (05-30-22 @ 04:47)        MICROBIOLOGY:    MRSA PCR Result.: NotDetec (05-27-22 @ 15:11)      Culture - Blood (collected 28 May 2022 18:48)  Source: .Blood Blood  Preliminary Report:    No growth to date.    Culture - Blood (collected 28 May 2022 18:48)  Source: .Blood Blood  Preliminary Report:    No growth to date.    HIV-1 RNA Quantitative, Viral Load: NOT DET. copies/mL (05-27-22 @ 15:30)  HIV-1 Viral Load Result: NOT DET. (05-27-22 @ 15:30)    CMV IgG Interpretation: Positive (05-27-22 @ 17:18)  Toxoplasma IgG Interpretation: Negative (05-27-22 @ 17:18)  Treponema Pallidum Antibody Interpretation: Negative (05-27-22 @ 17:18)    Rapid RVP Result: NotDetec (05-28 @ 18:35)    COVID-19 PCR: Negative (05-26-22 @ 14:12)  COVID-19 PCR: Negative (05-20-22 @ 14:17)    COVID-19 [employer] AB Interp: Positive (05-27-22 @ 17:23)  COVID-19 Nucleocapsid HERBERTH AB Interp: Positive (05-27-22 @ 17:23)      RADIOLOGY:  imaging below personally reviewed    < from: CT Chest No Cont (05.27.22 @ 20:08) >  IMPRESSION:    Mild interstitial pulmonary edema.    < end of copied text >

## 2022-06-01 NOTE — CONSULT NOTE ADULT - ASSESSMENT
66M with a history of ACC/AHA Stage C->D mixed NICM/ICM (likely familial with strong FH and early arrhythmia history in his 30's) with LVED 5.2 cm and LVEF 10-15% s/p PPM upgraded to CRT-D, CAD s/p PCI to mLAD 4/2022, well controlled DM2 (A1c 6.2%), and CKD III (Cr 1.4) who initially presented to [facility] 5/20 with near syncope in setting of worsening HF symptoms and found to have 41 episodes of VT, many terminating with ATP. LHC did not reveal new obstructive CAD and her underwent EPS which did not reveal endocardial substrate amenable for ablation. RHC revealed severely depressed cardiac output and he was transferred to [facility] 5/26 for advanced therapies evaluation. Transplant hepatology consulted to evaluate for cirrhosis prior to advanced HF therapies.    Impression:  #Pre-advanced HF therapies evaluation: pt with normal liver enzymes. Hep B immune, HCV AB negative. Normal liver on US abd. Low suspicion for fibrosis -- awaiting fibroscan    Recommendations:   - F/u US fibroscan results to determine hepatology clearance for advanced HF therapies      Thank you for involving us in the care of this patient, please reach out if any further questions.     Frank Lombardi MD  Gastroenterology/Hepatology Fellow, PGY5    Available on [employer] Teams  [phone] ([facility])  [phone] ([facility])  Please contact on call fellow weekdays after 5pm-7am and weekends: [phone]    66M with a history of ACC/AHA Stage C->D mixed NICM/ICM (likely familial with strong FH and early arrhythmia history in his 30's) with LVED 5.2 cm and LVEF 10-15% s/p PPM upgraded to CRT-D, CAD s/p PCI to mLAD 4/2022, well controlled DM2 (A1c 6.2%), and CKD III (Cr 1.4) who initially presented to [facility] 5/20 with near syncope in setting of worsening HF symptoms and found to have 41 episodes of VT, many terminating with ATP. LHC did not reveal new obstructive CAD and her underwent EPS which did not reveal endocardial substrate amenable for ablation. RHC revealed severely depressed cardiac output and he was transferred to [facility] 5/26 for advanced therapies evaluation. Transplant hepatology consulted to evaluate for cirrhosis prior to advanced HF therapies.    Impression:  #Pre-advanced HF therapies evaluation: pt with normal liver enzymes. Hep B non-immune, HCV AB negative. Normal liver on US abd. Low suspicion for fibrosis -- awaiting fibroscan    Recommendations:   - F/u US fibroscan results to determine hepatology clearance for advanced HF therapies  - HBV vaccination    Thank you for involving us in the care of this patient, please reach out if any further questions.     Frank Lombardi MD  Gastroenterology/Hepatology Fellow, PGY5    Available on [employer] Teams  [phone] ([facility])  [phone] ([facility])  Please contact on call fellow weekdays after 5pm-7am and weekends: [phone]

## 2022-06-01 NOTE — PROGRESS NOTE ADULT - SUBJECTIVE AND OBJECTIVE BOX
Subjective: Patient seen and examined resting in chair. ON was transferred from CICU to floor.     Medications:  allopurinol 100 milliGRAM(s) Oral daily  aMIOdarone    Tablet 400 milliGRAM(s) Oral two times a day  aspirin enteric coated 81 milliGRAM(s) Oral daily  atorvastatin 80 milliGRAM(s) Oral at bedtime  chlorhexidine 4% Liquid 1 Application(s) Topical <User Schedule>  heparin   Injectable 5000 Unit(s) SubCutaneous every 8 hours  hydrALAZINE 25 milliGRAM(s) Oral every 8 hours  influenza   Vaccine 0.5 milliLiter(s) IntraMuscular once  insulin lispro (ADMELOG) corrective regimen sliding scale   SubCutaneous three times a day before meals  insulin lispro (ADMELOG) corrective regimen sliding scale   SubCutaneous at bedtime  lidocaine   4% Patch 1 Patch Transdermal daily  metoprolol succinate ER      milrinone Infusion 0.5 MICROgram(s)/kG/Min IV Continuous <Continuous>  pantoprazole    Tablet 40 milliGRAM(s) Oral before breakfast  polyethylene glycol 3350 17 Gram(s) Oral two times a day  predniSONE   Tablet 40 milliGRAM(s) Oral daily  senna 2 Tablet(s) Oral at bedtime    Vitals:  Vital Signs Last 24 Hours  T(C): 36.7 (22 @ 11:26), Max: 36.8 (22 @ 16:00)  HR: 80 (22 @ 11:26) (80 - 101)  BP: 109/74 (22 @ 11:26) (101/65 - 115/74)  RR: 18 (22 @ 11:26) (16 - 26)  SpO2: 98% (22 @ 11:26) (93% - 98%)    Weight in k.2 ( @ 07:00)    I&O's Summary    31 May 2022 07:  -  2022 07:00  --------------------------------------------------------  IN: 852 mL / OUT: 1150 mL / NET: -298 mL    :  -  2022 12:37  --------------------------------------------------------  IN: 0 mL / OUT: 0 mL / NET: 0 mL      Physical Exam  General: No distress. Comfortable.  Neck: JVP ~12 cm  Chest: Clear to auscultation bilaterally  CV: Normal S1 and S2. No murmurs, rub, or gallops. Radial pulses normal.  Abdomen: Soft, non-distended, non-tender  Neurology: Alert and oriented times three      Labs:                        16.3   14.28 )-----------( 311      ( 2022 11:50 )             50.6         137  |  99  |  34<H>  ----------------------------<  176<H>  4.4   |  22  |  1.28    Ca    9.8      2022 11:50  Phos  2.9       Mg     2.4         TPro  7.8  /  Alb  4.1  /  TBili  0.8  /  DBili  x   /  AST  40  /  ALT  45  /  AlkPhos  74            Serum Pro-Brain Natriuretic Peptide: 3102 pg/mL ( @ 15:23)  Serum Pro-Brain Natriuretic Peptide: 4206 pg/mL ( @ 20:22)        Lactate, Blood: 1.4 mmol/L ( @ 05:00)  Lactate, Blood: 1.0 mmol/L ( @ 04:47)

## 2022-06-01 NOTE — CONSULT NOTE ADULT - ASSESSMENT
66 year old male with history of mixed Ischemic/NICM Cardiomyopathy (EF 25-30% at baseline, s/p BIV-ICD), CAD (medically managed MIs in 2008,2011, most recent stent in April 2022 to mLAD) presented with multiple near syncope, found to have 41 episodes of VT and admitted initially to cardiac telemetry for further evaluation/management.     # CRC screening  # Mixed ischemic/nonischemic cardiomyopathy  Underwent virtual colonoscopy with inadequate visualization of left colon, however no other findings noted throughout.  Never had prior colonoscopy.     Recommendations:  -trend vitals, CBC, and monitor for clinical signs of bleeding  -maintain active type and screen  -transfusion goal to maintain hemoglobin >/= 7.0 and platelets >/= 50  -clear liquid diet starting now, colon prep Thursday afternoon/evening, and NPO after midnight Thursday night for tentative colonoscopy Friday    Recommendations incomplete until finalized by attending signature/attestation to note.    Inderjit Juarez, PGY-4  GI/Hepatology Fellow    MONDAY-FRIDAY 8AM-5PM:  Pager# [phone] ([facility]) or [phone] ([facility])    NON-URGENT CONSULTS:  Please email [email or web address]  AT NIGHT AND ON WEEKENDS:  Contact on-call GI fellow via answering service ([phone]) from 5pm-8am and on weekends/holidays

## 2022-06-01 NOTE — CONSULT NOTE ADULT - SUBJECTIVE AND OBJECTIVE BOX
Chief Complaint:  Patient is a 66y old  Male who presents with a chief complaint of LVAD/OHT eval (31 May 2022 16:42)      HPI: 66M with a history of ACC/AHA Stage C->D mixed NICM/ICM (likely familial with strong FH and early arrhythmia history in his 30's) with LVED 5.2 cm and LVEF 10-15% s/p PPM upgraded to CRT-D, CAD s/p PCI to mLAD 2022, well controlled DM2 (A1c 6.2%), and CKD III (Cr 1.4) who initially presented to [facility]  with near syncope in setting of worsening HF symptoms and found to have 41 episodes of VT, many terminating with ATP. LHC did not reveal new obstructive CAD and her underwent EPS which did not reveal endocardial substrate amenable for ablation. RHC revealed severely depressed cardiac output and he was transferred to [facility]  for advanced therapies evaluation. Transplant hepatology consulted to evaluate for cirrhosis prior to advanced HF therapies.     Pt denies personal or family Hx of liver disease. Denies ETOH use recently, previously drank a few beers every other day.     Allergies:  No Known Allergies      Home Medications:    Hospital Medications:  allopurinol 100 milliGRAM(s) Oral daily  aMIOdarone    Tablet 400 milliGRAM(s) Oral two times a day  aspirin enteric coated 81 milliGRAM(s) Oral daily  atorvastatin 80 milliGRAM(s) Oral at bedtime  chlorhexidine 4% Liquid 1 Application(s) Topical <User Schedule>  heparin   Injectable 5000 Unit(s) SubCutaneous every 8 hours  hydrALAZINE 25 milliGRAM(s) Oral every 8 hours  influenza   Vaccine 0.5 milliLiter(s) IntraMuscular once  insulin lispro (ADMELOG) corrective regimen sliding scale   SubCutaneous three times a day before meals  insulin lispro (ADMELOG) corrective regimen sliding scale   SubCutaneous at bedtime  lidocaine   4% Patch 1 Patch Transdermal daily  metoprolol succinate ER      milrinone Infusion 0.5 MICROgram(s)/kG/Min IV Continuous <Continuous>  pantoprazole    Tablet 40 milliGRAM(s) Oral before breakfast  polyethylene glycol 3350 17 Gram(s) Oral two times a day  predniSONE   Tablet 40 milliGRAM(s) Oral daily  senna 2 Tablet(s) Oral at bedtime      PMHX/PSHX:  AV block    Essential hypertension    Pure hypercholesterolemia    Myocardial infarct, old    Chronic HFrEF (heart failure with reduced ejection fraction)    Chronic kidney disease, unspecified CKD stage    CAD (coronary artery disease)    HLD (hyperlipidemia)    Type 2 diabetes mellitus    Artificial cardiac pacemaker        Family history:  Family history of acute myocardial infarction (Father)        Denies family history of colon cancer/polyps, stomach cancer/polyps, pancreatic cancer/masses, liver cancer/disease, ovarian cancer and endometrial cancer.    Social History:     Tob: Denies  EtOH: Denies  Illicit Drugs: Denies    ROS:     General:  No wt loss, fevers, chills, night sweats, fatigue  Eyes:  Good vision, no reported pain  ENT:  No sore throat, pain, runny nose, dysphagia  CV:  No pain, palpitations, hypo/hypertension  Pulm:  No dyspnea, cough, tachypnea, wheezing  GI:  see above  :  No pain, bleeding, incontinence, nocturia  Muscle:  No pain, weakness  Neuro:  No weakness, tingling, memory problems  Psych:  No fatigue, insomnia, mood problems, depression  Endocrine:  No polyuria, polydipsia, cold/heat intolerance  Heme:  No petechiae, ecchymosis, easy bruisability  Skin:  No rash, tattoos, scars, edema    PHYSICAL EXAM:     GENERAL:  No acute distress  HEENT:  Normocephalic/atraumatic, no scleral icterus  CHEST:  Clear to auscultation bilaterally, no wheezes/rales/ronchi, no accessory muscle use  HEART:  Regular rate and rhythm, no murmurs/rubs/gallops  ABDOMEN:  Soft, non-tender, non-distended, normoactive bowel sounds,  no masses, no hepato-splenomegaly, no signs of chronic liver disease  EXTREMITIES: No cyanosis, clubbing, or edema  SKIN:  No rash/erythema/ecchymoses/petechiae/wounds/abscess/warm/dry  NEURO:  Alert and oriented x 3, no asterixis    Vital Signs:  Vital Signs Last 24 Hrs  T(C): 36.7 (2022 11:26), Max: 36.8 (31 May 2022 16:00)  T(F): 98.1 (2022 11:26), Max: 98.2 (31 May 2022 16:00)  HR: 80 (2022 11:26) (80 - 101)  BP: 109/74 (2022 11:26) (101/65 - 115/74)  BP(mean): 89 (31 May 2022 18:50) (78 - 89)  RR: 18 (2022 11:26) (16 - 26)  SpO2: 98% (2022 11:26) (93% - 98%)  Daily     Daily Weight in k.2 (2022 07:00)    LABS:                        16.3   14.28 )-----------( 311      ( 2022 11:50 )             50.6     Mean Cell Volume: 93.4 fl (22 @ 11:50)    06    137  |  99  |  34<H>  ----------------------------<  176<H>  4.4   |  22  |  1.28    Ca    9.8      2022 11:50  Phos  2.9     05-31  Mg     2.4     05-31    TPro  7.8  /  Alb  4.1  /  TBili  0.8  /  DBili  x   /  AST  40  /  ALT  45  /  AlkPhos  74  06-01    LIVER FUNCTIONS - ( 2022 11:50 )  Alb: 4.1 g/dL / Pro: 7.8 g/dL / ALK PHOS: 74 U/L / ALT: 45 U/L / AST: 40 U/L / GGT: x                                       16.3   14.28 )-----------( 311      ( 2022 11:50 )             50.6                         15.4   10.25 )-----------( 272      ( 31 May 2022 05:00 )             46.3                         13.4   14.92 )-----------( 245      ( 30 May 2022 04:47 )             39.6       Imaging:

## 2022-06-01 NOTE — PROGRESS NOTE ADULT - ASSESSMENT
65 YO M with a history of ACC/AHA Stage C->D mixed 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 5/20 with near syncope in setting of worsening HF symptoms and found to have 41 episodes of VT, many terminating with ATP. LHC did not reveal new obstructive CAD and her underwent EPS which did not reveal endocardial substrate amenable for ablation. RHC revealed severely depressed cardiac output and he was transferred to [facility] 5/26 for advanced therapies evaluation.    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 He has improved significantly with sequential uptitration of inotropes and increased pacing rate. He is INTERMACS 3 and SCAI stage B. He will likely need advanced therapies this admission. He has significant pulmonary hypertension but PVR is reversible with nipride and suspect would improve with LV unloading given severely elevated PCWP despite euvolemia and low diastolic pulmonary gradient. Will be presented to the selection committee for advance therapies on Thursday.     Review of studies  TTE 5/21: LV 5.2 cm, LVEF 10-15%, LVOT VTI 10 cm, moderate RV dysfunction, mild AI, minimal MR  EKG: a-BiV paced  [facility] 5/23: patent mLAD stent with slow flow, D1 with 40-50% stenosis, mild disease otherwise  [facility] 5/26: RA 12, PA 70/40 (50), PCWP 22, Milana CO/CI 2.3/1.3, MAP 83 with SVR 2469, PVR 12 PERSAUD    Hemodynamics  5/27 (milrinone 0.25): RA 10, PA 76/35 (49), PCWP 34, PA sat 57% with Milana CO/CI 3.1/1.6, MAP 71 with SVR 1574, PVR 4.8  5/28 (on milrinone 0.375): RA 7, PA 63/31, PCWP 26, PA sat 72.8% with Milana CO/CI 4.9/2.5 (CI later dropped to 1.6 in the afternoon), MAP 70 with SVR 1039, PVR 2.9  5/29 (on milrinone 0.5): RA 8, PA 75/32 (50), PCWP 28, PA sat 74% with Milana CO/CI 5.0/2.6, MAP 77 with SVR 1200, PVR 4.4  5/29 (on milrinone 0.5 and nipride to 3 mcg/kg/min): RA 6, PA 59/31 (40), PCWP 30, PA sat 79% with Milana CO/CI 7.0/3.6, BP 97/57 (MAP 70) with , PVR 1.4

## 2022-06-01 NOTE — PROGRESS NOTE ADULT - SUBJECTIVE AND OBJECTIVE BOX
[facility] Division of Hospital Medicine  Simon Cardona MD  Pager (ANNELISE-DEBORAH, 4O-6G): [phone]  Other Times:  [phone]    Patient is a 66y old  Male who presents with a chief complaint of LVAD/OHT eval (01 Jun 2022 14:54)      SUBJECTIVE / OVERNIGHT EVENTS: No acute events. Denies cp, shortness of breath or palpitations. No nausea  ADDITIONAL REVIEW OF SYSTEMS: No fever    MEDICATIONS  (STANDING):  allopurinol 100 milliGRAM(s) Oral daily  aMIOdarone    Tablet 400 milliGRAM(s) Oral two times a day  aspirin enteric coated 81 milliGRAM(s) Oral daily  atorvastatin 80 milliGRAM(s) Oral at bedtime  chlorhexidine 4% Liquid 1 Application(s) Topical <User Schedule>  heparin   Injectable 5000 Unit(s) SubCutaneous every 8 hours  hydrALAZINE 25 milliGRAM(s) Oral every 8 hours  influenza   Vaccine 0.5 milliLiter(s) IntraMuscular once  insulin lispro (ADMELOG) corrective regimen sliding scale   SubCutaneous three times a day before meals  insulin lispro (ADMELOG) corrective regimen sliding scale   SubCutaneous at bedtime  lidocaine   4% Patch 1 Patch Transdermal daily  metoprolol succinate ER      milrinone Infusion 0.5 MICROgram(s)/kG/Min (11 mL/Hr) IV Continuous <Continuous>  pantoprazole    Tablet 40 milliGRAM(s) Oral before breakfast  polyethylene glycol 3350 17 Gram(s) Oral two times a day  predniSONE   Tablet 40 milliGRAM(s) Oral daily  senna 2 Tablet(s) Oral at bedtime    MEDICATIONS  (PRN):      CAPILLARY BLOOD GLUCOSE      POCT Blood Glucose.: 165 mg/dL (01 Jun 2022 11:36)  POCT Blood Glucose.: 116 mg/dL (01 Jun 2022 08:35)  POCT Blood Glucose.: 136 mg/dL (31 May 2022 21:51)  POCT Blood Glucose.: 131 mg/dL (31 May 2022 17:10)    I&O's Summary    31 May 2022 07:01  -  01 Jun 2022 07:00  --------------------------------------------------------  IN: 852 mL / OUT: 1150 mL / NET: -298 mL    01 Jun 2022 07:01  -  01 Jun 2022 15:27  --------------------------------------------------------  IN: 720 mL / OUT: 400 mL / NET: 320 mL        PHYSICAL EXAM:  Vital Signs Last 24 Hrs  T(C): 36.7 (01 Jun 2022 11:26), Max: 36.8 (31 May 2022 16:00)  T(F): 98.1 (01 Jun 2022 11:26), Max: 98.2 (31 May 2022 16:00)  HR: 80 (01 Jun 2022 11:26) (80 - 101)  BP: 109/74 (01 Jun 2022 11:26) (105/71 - 115/74)  BP(mean): 89 (31 May 2022 18:50) (84 - 89)  RR: 18 (01 Jun 2022 11:26) (16 - 20)  SpO2: 98% (01 Jun 2022 11:26) (95% - 98%)  CONSTITUTIONAL: NAD, well-developed, well-groomed  EYES: EOMI; conjunctiva and sclera clear  ENMT: Moist oral mucosa, no pharyngeal injection or exudates  RESPIRATORY: Normal respiratory effort; lungs are clear to auscultation bilaterally  CARDIOVASCULAR: Regular rate and rhythm, normal S1 and S2, no murmur/rub/gallop; No lower extremity edema, LE ext cool  ABDOMEN: Nontender to palpation, normoactive bowel sounds, no rebound/guarding; No hepatosplenomegaly  PSYCH: A+O to person, place, and time; affect appropriate  NEUROLOGY: moving all extremities; no gross sensory deficits   SKIN: No rashes; no palpable lesions    LABS:                        16.3   14.28 )-----------( 311      ( 01 Jun 2022 11:50 )             50.6     06-01    137  |  99  |  34<H>  ----------------------------<  176<H>  4.4   |  22  |  1.28    Ca    9.8      01 Jun 2022 11:50  Phos  2.9     05-31  Mg     2.4     05-31    TPro  7.8  /  Alb  4.1  /  TBili  0.8  /  DBili  x   /  AST  40  /  ALT  45  /  AlkPhos  74  06-01                RADIOLOGY & ADDITIONAL TESTS:  Results Reviewed:   Imaging Personally Reviewed:  Electrocardiogram Personally Reviewed:    COORDINATION OF CARE:  Care Discussed with Consultants/Other Providers [Y/N]:  Prior or Outpatient Records Reviewed [Y/N]:

## 2022-06-02 DIAGNOSIS — I80.8 PHLEBITIS AND THROMBOPHLEBITIS OF OTHER SITES: ICD-10-CM

## 2022-06-02 LAB
% GAMMA, URINE: 7.6 % — SIGNIFICANT CHANGE UP
ALBUMIN 24H MFR UR ELPH: 26.8 % — SIGNIFICANT CHANGE UP
ALPHA1 GLOB 24H MFR UR ELPH: 39.8 % — SIGNIFICANT CHANGE UP
ALPHA2 GLOB 24H MFR UR ELPH: 14.5 % — SIGNIFICANT CHANGE UP
ANION GAP SERPL CALC-SCNC: 13 MMOL/L — SIGNIFICANT CHANGE UP (ref 5–17)
B-GLOBULIN 24H MFR UR ELPH: 11.3 % — SIGNIFICANT CHANGE UP
BUN SERPL-MCNC: 33 MG/DL — HIGH (ref 7–23)
CALCIUM SERPL-MCNC: 9.6 MG/DL — SIGNIFICANT CHANGE UP (ref 8.4–10.5)
CHLORIDE SERPL-SCNC: 102 MMOL/L — SIGNIFICANT CHANGE UP (ref 96–108)
CO2 SERPL-SCNC: 21 MMOL/L — LOW (ref 22–31)
COLLECT DURATION TIME UR: 24 HR — SIGNIFICANT CHANGE UP
CREAT SERPL-MCNC: 1.21 MG/DL — SIGNIFICANT CHANGE UP (ref 0.5–1.3)
CULTURE RESULTS: SIGNIFICANT CHANGE UP
CULTURE RESULTS: SIGNIFICANT CHANGE UP
EGFR: 67 ML/MIN/1.73M2 — SIGNIFICANT CHANGE UP
GLUCOSE BLDC GLUCOMTR-MCNC: 101 MG/DL — HIGH (ref 70–99)
GLUCOSE BLDC GLUCOMTR-MCNC: 101 MG/DL — HIGH (ref 70–99)
GLUCOSE BLDC GLUCOMTR-MCNC: 107 MG/DL — HIGH (ref 70–99)
GLUCOSE BLDC GLUCOMTR-MCNC: 159 MG/DL — HIGH (ref 70–99)
GLUCOSE BLDC GLUCOMTR-MCNC: 92 MG/DL — SIGNIFICANT CHANGE UP (ref 70–99)
GLUCOSE SERPL-MCNC: 107 MG/DL — HIGH (ref 70–99)
HCT VFR BLD CALC: 46.1 % — SIGNIFICANT CHANGE UP (ref 39–50)
HDV AB SER-ACNC: NEGATIVE — SIGNIFICANT CHANGE UP
HGB BLD-MCNC: 15 G/DL — SIGNIFICANT CHANGE UP (ref 13–17)
INTERPRETATION 24H UR IFE-IMP: SIGNIFICANT CHANGE UP
INTERPRETATION 24H UR IFE-IMP: SIGNIFICANT CHANGE UP
M PROTEIN 24H UR ELPH-MRATE: 0 MG/24HR — SIGNIFICANT CHANGE UP (ref 0–0)
M PROTEIN 24H UR ELPH-MRATE: 0 MG/DL — SIGNIFICANT CHANGE UP
MCHC RBC-ENTMCNC: 29.9 PG — SIGNIFICANT CHANGE UP (ref 27–34)
MCHC RBC-ENTMCNC: 32.5 GM/DL — SIGNIFICANT CHANGE UP (ref 32–36)
MCV RBC AUTO: 91.8 FL — SIGNIFICANT CHANGE UP (ref 80–100)
NRBC # BLD: 0 /100 WBCS — SIGNIFICANT CHANGE UP (ref 0–0)
PLATELET # BLD AUTO: 315 K/UL — SIGNIFICANT CHANGE UP (ref 150–400)
POTASSIUM SERPL-MCNC: 4.6 MMOL/L — SIGNIFICANT CHANGE UP (ref 3.5–5.3)
POTASSIUM SERPL-SCNC: 4.6 MMOL/L — SIGNIFICANT CHANGE UP (ref 3.5–5.3)
PROT ?TM UR-MCNC: 15 MG/DL — HIGH (ref 0–12)
PROT PATTERN 24H UR ELPH-IMP: SIGNIFICANT CHANGE UP
PROTEIN QUANT CALC, URINE: 195 MG/24 H — HIGH (ref 50–100)
RBC # BLD: 5.02 M/UL — SIGNIFICANT CHANGE UP (ref 4.2–5.8)
RBC # FLD: 15.7 % — HIGH (ref 10.3–14.5)
SARS-COV-2 RNA SPEC QL NAA+PROBE: SIGNIFICANT CHANGE UP
SODIUM SERPL-SCNC: 136 MMOL/L — SIGNIFICANT CHANGE UP (ref 135–145)
SPECIMEN SOURCE: SIGNIFICANT CHANGE UP
SPECIMEN SOURCE: SIGNIFICANT CHANGE UP
TOTAL VOLUME - 24 HOUR: 1300 ML — SIGNIFICANT CHANGE UP
URINE CREATININE CALCULATION: 1.1 G/24 H — SIGNIFICANT CHANGE UP (ref 1–2)
WBC # BLD: 15.85 K/UL — HIGH (ref 3.8–10.5)
WBC # FLD AUTO: 15.85 K/UL — HIGH (ref 3.8–10.5)

## 2022-06-02 PROCEDURE — 99231 SBSQ HOSP IP/OBS SF/LOW 25: CPT

## 2022-06-02 PROCEDURE — 99233 SBSQ HOSP IP/OBS HIGH 50: CPT

## 2022-06-02 PROCEDURE — 90791 PSYCH DIAGNOSTIC EVALUATION: CPT

## 2022-06-02 PROCEDURE — 99231 SBSQ HOSP IP/OBS SF/LOW 25: CPT | Mod: GC

## 2022-06-02 PROCEDURE — 99232 SBSQ HOSP IP/OBS MODERATE 35: CPT | Mod: GC

## 2022-06-02 RX ORDER — CEFAZOLIN SODIUM 1 G
1000 VIAL (EA) INJECTION EVERY 8 HOURS
Refills: 0 | Status: DISCONTINUED | OUTPATIENT
Start: 2022-06-02 | End: 2022-06-07

## 2022-06-02 RX ORDER — SOD SULF/SODIUM/NAHCO3/KCL/PEG
4000 SOLUTION, RECONSTITUTED, ORAL ORAL ONCE
Refills: 0 | Status: COMPLETED | OUTPATIENT
Start: 2022-06-02 | End: 2022-06-02

## 2022-06-02 RX ORDER — HEPATITIS B VIRUS VACCINE,RECB 20 MCG/ML
20 VIAL (ML) INTRAMUSCULAR ONCE
Refills: 0 | Status: COMPLETED | OUTPATIENT
Start: 2022-06-02 | End: 2022-06-03

## 2022-06-02 RX ADMIN — Medication 40 MILLIGRAM(S): at 05:32

## 2022-06-02 RX ADMIN — Medication 4000 MILLILITER(S): at 16:47

## 2022-06-02 RX ADMIN — Medication 100 MILLIGRAM(S): at 21:53

## 2022-06-02 RX ADMIN — Medication 25 MILLIGRAM(S): at 21:53

## 2022-06-02 RX ADMIN — HEPARIN SODIUM 5000 UNIT(S): 5000 INJECTION INTRAVENOUS; SUBCUTANEOUS at 05:32

## 2022-06-02 RX ADMIN — AMIODARONE HYDROCHLORIDE 200 MILLIGRAM(S): 400 TABLET ORAL at 05:32

## 2022-06-02 RX ADMIN — HEPARIN SODIUM 5000 UNIT(S): 5000 INJECTION INTRAVENOUS; SUBCUTANEOUS at 21:52

## 2022-06-02 RX ADMIN — Medication 2: at 08:31

## 2022-06-02 RX ADMIN — PANTOPRAZOLE SODIUM 40 MILLIGRAM(S): 20 TABLET, DELAYED RELEASE ORAL at 05:32

## 2022-06-02 RX ADMIN — MILRINONE LACTATE 11 MICROGRAM(S)/KG/MIN: 1 INJECTION, SOLUTION INTRAVENOUS at 21:52

## 2022-06-02 RX ADMIN — Medication 81 MILLIGRAM(S): at 11:39

## 2022-06-02 RX ADMIN — Medication 100 MILLIGRAM(S): at 11:40

## 2022-06-02 RX ADMIN — Medication 25 MILLIGRAM(S): at 08:15

## 2022-06-02 RX ADMIN — ATORVASTATIN CALCIUM 80 MILLIGRAM(S): 80 TABLET, FILM COATED ORAL at 21:53

## 2022-06-02 NOTE — PROGRESS NOTE ADULT - SUBJECTIVE AND OBJECTIVE BOX
Follow Up:  pre-advanced therapies eval    Interval History/ROS:  No acute overnight events    Allergies  No Known Allergies        ANTIMICROBIALS:      OTHER MEDS:  MEDICATIONS  (STANDING):  allopurinol 100 daily  aMIOdarone    Tablet 200 daily  aspirin enteric coated 81 daily  atorvastatin 80 at bedtime  heparin   Injectable 5000 every 8 hours  hydrALAZINE 25 every 8 hours  influenza   Vaccine 0.5 once  insulin lispro (ADMELOG) corrective regimen sliding scale  three times a day before meals  insulin lispro (ADMELOG) corrective regimen sliding scale  at bedtime  metoprolol succinate ER    metoprolol succinate ER 25 daily  milrinone Infusion 0.5 <Continuous>  pantoprazole    Tablet 40 before breakfast  polyethylene glycol 3350 17 two times a day  senna 2 at bedtime      Vital Signs Last 24 Hrs  T(C): 37.1 (02 Jun 2022 11:33), Max: 37.1 (02 Jun 2022 11:33)  T(F): 98.8 (02 Jun 2022 11:33), Max: 98.8 (02 Jun 2022 11:33)  HR: 80 (02 Jun 2022 11:33) (79 - 80)  BP: 97/61 (02 Jun 2022 11:33) (95/65 - 118/81)  BP(mean): --  RR: 18 (02 Jun 2022 11:33) (16 - 19)  SpO2: 96% (02 Jun 2022 11:33) (94% - 98%)    PHYSICAL EXAM:  Constitutional: non-toxic, no distress  HEAD/EYES: anicteric, no conjunctival injection  ENT:  supple, no thrush  Cardiovascular:   +S1/S2  Respiratory:  +BS bilaterally  GI:  soft, non-tender, +bowel sounds  :  no block, no CVA tenderness  Musculoskeletal:  no synovitis, normal ROM  Neurologic: awake and alert,  no focal findings  Skin:  no rash, no erythema, no phlebitis                                15.0   15.85 )-----------( 315      ( 02 Jun 2022 06:39 )             46.1       06-02    136  |  102  |  33<H>  ----------------------------<  107<H>  4.6   |  21<L>  |  1.21    Ca    9.6      02 Jun 2022 06:47    TPro  7.8  /  Alb  4.1  /  TBili  0.8  /  DBili  x   /  AST  40  /  ALT  45  /  AlkPhos  74  06-01          MICROBIOLOGY:  v    Culture - Blood (collected 28 May 2022 18:48)  Source: .Blood Blood  Preliminary Report (29 May 2022 23:01):    No growth to date.    Culture - Blood (collected 28 May 2022 18:48)  Source: .Blood Blood  Preliminary Report (29 May 2022 23:01):    No growth to date.      CMV IgG Antibody: >10.00 U/mL (05-27-22 @ 17:18)  Toxoplasma IgG Screen: <3.0 IU/mL (05-27-22 @ 17:18)  HIV-1 RNA Quantitative, Viral Load Log: NOT DET. lg /mL (05-27-22 @ 15:30)    Rapid RVP Result: NotDetec (05-28 @ 18:35)        RADIOLOGY:   Follow Up:  pre-advanced therapies eval    Interval History/ROS:  No acute overnight events  Phlebitis looks a little worse today    Allergies  No Known Allergies        ANTIMICROBIALS:      OTHER MEDS:  MEDICATIONS  (STANDING):  allopurinol 100 daily  aMIOdarone    Tablet 200 daily  aspirin enteric coated 81 daily  atorvastatin 80 at bedtime  heparin   Injectable 5000 every 8 hours  hydrALAZINE 25 every 8 hours  influenza   Vaccine 0.5 once  insulin lispro (ADMELOG) corrective regimen sliding scale  three times a day before meals  insulin lispro (ADMELOG) corrective regimen sliding scale  at bedtime  metoprolol succinate ER    metoprolol succinate ER 25 daily  milrinone Infusion 0.5 <Continuous>  pantoprazole    Tablet 40 before breakfast  polyethylene glycol 3350 17 two times a day  senna 2 at bedtime      Vital Signs Last 24 Hrs  T(C): 37.1 (02 Jun 2022 11:33), Max: 37.1 (02 Jun 2022 11:33)  T(F): 98.8 (02 Jun 2022 11:33), Max: 98.8 (02 Jun 2022 11:33)  HR: 80 (02 Jun 2022 11:33) (79 - 80)  BP: 97/61 (02 Jun 2022 11:33) (95/65 - 118/81)  BP(mean): --  RR: 18 (02 Jun 2022 11:33) (16 - 19)  SpO2: 96% (02 Jun 2022 11:33) (94% - 98%)    PHYSICAL EXAM:  Constitutional: non-toxic, no distress  HEAD/EYES: anicteric, no conjunctival injection  ENT:  supple, no thrush  Cardiovascular:   +S1/S2  Respiratory:  +BS bilaterally  GI:  soft, non-tender, +bowel sounds  :  no block, no CVA tenderness  Musculoskeletal:  no synovitis, normal ROM  Neurologic: awake and alert,  no focal findings  Skin:  + phlebitis Rue                                15.0   15.85 )-----------( 315      ( 02 Jun 2022 06:39 )             46.1       06-02    136  |  102  |  33<H>  ----------------------------<  107<H>  4.6   |  21<L>  |  1.21    Ca    9.6      02 Jun 2022 06:47    TPro  7.8  /  Alb  4.1  /  TBili  0.8  /  DBili  x   /  AST  40  /  ALT  45  /  AlkPhos  74  06-01          MICROBIOLOGY:  v    Culture - Blood (collected 28 May 2022 18:48)  Source: .Blood Blood  Preliminary Report (29 May 2022 23:01):    No growth to date.    Culture - Blood (collected 28 May 2022 18:48)  Source: .Blood Blood  Preliminary Report (29 May 2022 23:01):    No growth to date.      CMV IgG Antibody: >10.00 U/mL (05-27-22 @ 17:18)  Toxoplasma IgG Screen: <3.0 IU/mL (05-27-22 @ 17:18)  HIV-1 RNA Quantitative, Viral Load Log: NOT DET. lg /mL (05-27-22 @ 15:30)    Rapid RVP Result: NotDetec (05-28 @ 18:35)        RADIOLOGY:

## 2022-06-02 NOTE — PROGRESS NOTE ADULT - PROBLEM SELECTOR PLAN 5
- hx of VT s/p unsuccessful VT ablation  - continue on amio 200 mg PO QD  - since being admitted to [facility] has not had any episodes of VT, currently tolerating high dose milrinone.

## 2022-06-02 NOTE — PROGRESS NOTE ADULT - SUBJECTIVE AND OBJECTIVE BOX
Interval Events:   No acute events overnight.  Patient without acute symptoms at this time.    ROS:   12 point review of systems performed and negative except otherwise noted in HPI.    Hospital Medications:  allopurinol 100 milliGRAM(s) Oral daily  aMIOdarone    Tablet 200 milliGRAM(s) Oral daily  aspirin enteric coated 81 milliGRAM(s) Oral daily  atorvastatin 80 milliGRAM(s) Oral at bedtime  chlorhexidine 4% Liquid 1 Application(s) Topical <User Schedule>  heparin   Injectable 5000 Unit(s) SubCutaneous every 8 hours  hydrALAZINE 25 milliGRAM(s) Oral every 8 hours  influenza   Vaccine 0.5 milliLiter(s) IntraMuscular once  insulin lispro (ADMELOG) corrective regimen sliding scale   SubCutaneous three times a day before meals  insulin lispro (ADMELOG) corrective regimen sliding scale   SubCutaneous at bedtime  lidocaine   4% Patch 1 Patch Transdermal daily  metoprolol succinate ER      metoprolol succinate ER 25 milliGRAM(s) Oral daily  milrinone Infusion 0.5 MICROgram(s)/kG/Min IV Continuous <Continuous>  pantoprazole    Tablet 40 milliGRAM(s) Oral before breakfast  polyethylene glycol 3350 17 Gram(s) Oral two times a day  senna 2 Tablet(s) Oral at bedtime      PHYSICAL EXAM:   Vital Signs:  Vital Signs Last 24 Hrs  T(C): 37.1 (2022 11:33), Max: 37.1 (2022 11:33)  T(F): 98.8 (2022 11:33), Max: 98.8 (2022 11:33)  HR: 80 (2022 11:33) (79 - 80)  BP: 97/61 (2022 11:33) (95/65 - 118/81)  BP(mean): --  RR: 18 (2022 11:33) (16 - 19)  SpO2: 96% (2022 11:33) (94% - 98%)  Daily     Daily Weight in k.5 (2022 08:22)    GENERAL: no acute distress  NEURO: alert  HEENT: NCAT, no conjunctival pallor appreciated  CHEST: no respiratory distress, no accessory muscle use  CARDIAC: regular rate, +S1/S2  ABDOMEN: soft, nondistended, nontender, no rebound or guarding  EXTREMITIES: warm, well perfused  SKIN: no lesions noted    LABS: reviewed                        15.0   15.85 )-----------( 315      ( 2022 06:39 )             46.1     06-02    136  |  102  |  33<H>  ----------------------------<  107<H>  4.6   |  21<L>  |  1.21    Ca    9.6      2022 06:47    TPro  7.8  /  Alb  4.1  /  TBili  0.8  /  DBili  x   /  AST  40  /  ALT  45  /  AlkPhos  74  06-01    LIVER FUNCTIONS - ( 2022 11:50 )  Alb: 4.1 g/dL / Pro: 7.8 g/dL / ALK PHOS: 74 U/L / ALT: 45 U/L / AST: 40 U/L / GGT: x             Interval Diagnostic Studies: see sunrise for full report

## 2022-06-02 NOTE — PROGRESS NOTE ADULT - PROBLEM SELECTOR PLAN 1
- C/w milrinone to 0.5 mcg/kg/min  - CI/CO improved after AV pacing rate increased to 80  - continue with Toprol XL 25 mg PO QD   - Continue Hydralazine to 25 mg TID   - PAC/cordis discontinued 05/29 given fever   - Pending advanced therapies eval for LVAD/transplant (ABO A, PRA 0%), needs colonoscopy, schedule for tomorrow AM with cardiac anesthesia. Plan for RHC with IABP insertion in afternoon. NPO after midnight and please keep patient NPO after his colonoscopy tomorrow   - currently undergoing advance therapy eval, plan to present to the selection committee this upcoming Thursday

## 2022-06-02 NOTE — PROGRESS NOTE ADULT - NS ATTEND AMEND GEN_ALL_CORE FT
Patient known to me from [facility]. ACC/AHA Stage D HF, inotrope dependent on milrinone 0.5 mcg/kg/min with normalization of renal function, but with persistent post-capillary PH and very high PCW. Ongoing LVAD/transplant evaluation. Blood type A. Would benefit from IABP to offload the LV and reduce the PAP if he is a transplant candidate. I would not feel safe sending him home given severe LV dysfunction, recent h/o persistent VT (40 episodes) requiring ATP and a negative EPS without substrate to ablate.    Virtual colonoscopy not sufficient to evaluate left colon. Plan for sigmoidoscopy tomorrow with GI (8 am).  He needs bedside spirometry tomorrow for his transplant evaluation, please.  Please consult Heme regarding his elevated H/H to make sure no underlying issue (eg, PCV) and that there is no issue for pursuing heart transplant.    Will discuss right forearm phlebitis with Dr. Escalante (Transplant ID). Leukocytosis may be related to recent steroids.  If testing is unremarkable, blood cultures are negative, and he is approved for transplant listing tomorrow, we will place IABP and [place] and move him to the CICU.

## 2022-06-02 NOTE — PROGRESS NOTE ADULT - ASSESSMENT
66 year old male with history of mixed Ischemic/NICM Cardiomyopathy (EF 25-30% at baseline, s/p BIV-ICD), CAD (medically managed MIs in 2008,2011, most recent stent in April 2022 to mLAD) presented with multiple near syncope, found to have 41 episodes of VT and admitted initially to cardiac telemetry for further evaluation/management.     # CRC screening  # Mixed ischemic/nonischemic cardiomyopathy  Underwent virtual colonoscopy with inadequate visualization of left colon, however no other findings noted throughout.  Never had prior colonoscopy.     Recommendations:  -trend vitals, CBC, and monitor for clinical signs of bleeding  -maintain active type and screen  -transfusion goal to maintain hemoglobin >/= 7.0 and platelets >/= 50  -clear liquid diet, colon prep today, and NPO after midnight Thursday night for tentative colonoscopy Friday  -will need cardiac anesthesia    Recommendations incomplete until finalized by attending signature/attestation to note.    Inderjit Juarez, PGY-4  GI/Hepatology Fellow    MONDAY-FRIDAY 8AM-5PM:  Pager# [phone] ([facility]) or [phone] ([facility])    NON-URGENT CONSULTS:  Please email [email or web address]  AT NIGHT AND ON WEEKENDS:  Contact on-call GI fellow via answering service ([phone]) from 5pm-8am and on weekends/holidays

## 2022-06-02 NOTE — PROGRESS NOTE ADULT - PROBLEM SELECTOR PLAN 2
- BB on hold while in cardiogenic shock on milrinone  - ACE/ARB/ARNI/MRA on hold with JAGRUTI  - euvolemic off diuretics

## 2022-06-02 NOTE — PROGRESS NOTE ADULT - ASSESSMENT
65 YO M with a history of ACC/AHA Stage C->D mixed NICM/ICM (likely familial with strong FH and early arrhythmia history in his 30's) with LVED 5.2 cm and LVEF 10-15% s/p PPM upgraded to CRT-D, CAD s/p PCI to mLAD 4/2022, well controlled DM2 (A1c 6.2%), and CKD III (Cr 1.4) who initially presented to [facility] 5/20 with near syncope in setting of worsening HF symptoms and found to have 41 episodes of VT, many terminating with ATP. LHC did not reveal new obstructive CAD and her underwent EPS which did not reveal endocardial substrate amenable for ablation. RHC revealed severely depressed cardiac output and he was transferred to [facility] 5/26 for advanced therapies evaluation.    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 He has improved significantly with sequential uptitration of inotropes and increased pacing rate. He is INTERMACS 3 and SCAI stage B. He will likely need advanced therapies this admission. He has significant pulmonary hypertension but PVR is reversible with nipride and suspect would improve with LV unloading given severely elevated PCWP despite euvolemia and low diastolic pulmonary gradient. Will be presented to the selection committee for advance therapies later today.     Review of studies  TTE 5/21: LV 5.2 cm, LVEF 10-15%, LVOT VTI 10 cm, moderate RV dysfunction, mild AI, minimal MR  EKG: a-BiV paced  [facility] 5/23: patent mLAD stent with slow flow, D1 with 40-50% stenosis, mild disease otherwise  [facility] 5/26: RA 12, PA 70/40 (50), PCWP 22, Milana CO/CI 2.3/1.3, MAP 83 with SVR 2469, PVR 12 PERSAUD    Hemodynamics  5/27 (milrinone 0.25): RA 10, PA 76/35 (49), PCWP 34, PA sat 57% with Milana CO/CI 3.1/1.6, MAP 71 with SVR 1574, PVR 4.8  5/28 (on milrinone 0.375): RA 7, PA 63/31, PCWP 26, PA sat 72.8% with Milana CO/CI 4.9/2.5 (CI later dropped to 1.6 in the afternoon), MAP 70 with SVR 1039, PVR 2.9  5/29 (on milrinone 0.5): RA 8, PA 75/32 (50), PCWP 28, PA sat 74% with Milana CO/CI 5.0/2.6, MAP 77 with SVR 1200, PVR 4.4  5/29 (on milrinone 0.5 and nipride to 3 mcg/kg/min): RA 6, PA 59/31 (40), PCWP 30, PA sat 79% with Milana CO/CI 7.0/3.6, BP 97/57 (MAP 70) with , PVR 1.4

## 2022-06-02 NOTE — PROGRESS NOTE ADULT - ASSESSMENT
65 y/o male w/ PMHx HTN, HLD, type 2 DM, HFrEF (EF 15%), complete heart block s/p PPM (in 2006, upgraded to BiV-ICD in 09/2016), CAD (s/p recent BERNABE mid LAD at [facility] on 04/18/2022), and stage II CKD (baseline Cr ~1.3) presented with near syncope to OSH found to have 41 episodes of VT on device, s/p unsuccessful VT ablation and transferred to [facility] for management of cardiogenic shock and advanced therapy eval.

## 2022-06-02 NOTE — PROGRESS NOTE ADULT - SUBJECTIVE AND OBJECTIVE BOX
Interval Events:   US fibroscan with minimal risk of clinically significant fibrosis    Allergies:  No Known Allergies        Hospital Medications:  allopurinol 100 milliGRAM(s) Oral daily  aMIOdarone    Tablet 200 milliGRAM(s) Oral daily  aspirin enteric coated 81 milliGRAM(s) Oral daily  atorvastatin 80 milliGRAM(s) Oral at bedtime  ceFAZolin   IVPB 1000 milliGRAM(s) IV Intermittent every 8 hours  chlorhexidine 4% Liquid 1 Application(s) Topical <User Schedule>  heparin   Injectable 5000 Unit(s) SubCutaneous every 8 hours  hepatitis B Vaccine - Adult (HEPLISAV-B) 20 MICROGram(s) IntraMuscular once  hydrALAZINE 25 milliGRAM(s) Oral every 8 hours  influenza   Vaccine 0.5 milliLiter(s) IntraMuscular once  insulin lispro (ADMELOG) corrective regimen sliding scale   SubCutaneous three times a day before meals  insulin lispro (ADMELOG) corrective regimen sliding scale   SubCutaneous at bedtime  lidocaine   4% Patch 1 Patch Transdermal daily  metoprolol succinate ER      metoprolol succinate ER 25 milliGRAM(s) Oral daily  milrinone Infusion 0.5 MICROgram(s)/kG/Min IV Continuous <Continuous>  pantoprazole    Tablet 40 milliGRAM(s) Oral before breakfast  polyethylene glycol 3350 17 Gram(s) Oral two times a day  senna 2 Tablet(s) Oral at bedtime      PMHX/PSHX:  AV block    Essential hypertension    Pure hypercholesterolemia    Myocardial infarct, old    Chronic HFrEF (heart failure with reduced ejection fraction)    Chronic kidney disease, unspecified CKD stage    CAD (coronary artery disease)    HLD (hyperlipidemia)    Type 2 diabetes mellitus    Artificial cardiac pacemaker        Family history:  Family history of acute myocardial infarction (Father)        ROS:     General:  No wt loss, fevers, chills, night sweats, fatigue,   Eyes:  Good vision, no reported pain  ENT:  No sore throat, pain, runny nose, dysphagia  CV:  No pain, palpitations, hypo/hypertension  Pulm:  No dyspnea, cough, tachypnea, wheezing  GI:  see above  :  No pain, bleeding, incontinence, nocturia  Muscle:  No pain, weakness  Neuro:  No weakness, tingling, memory problems  Psych:  No fatigue, insomnia, mood problems, depression  Endocrine:  No polyuria, polydipsia, cold/heat intolerance  Heme:  No petechiae, ecchymosis, easy bruisability  Skin:  No rash, tattoos, scars, edema      PHYSICAL EXAM:   Vital Signs:  Vital Signs Last 24 Hrs  T(C): 37.1 (2022 11:33), Max: 37.1 (2022 11:33)  T(F): 98.8 (2022 11:33), Max: 98.8 (2022 11:33)  HR: 80 (2022 11:33) (79 - 80)  BP: 97/61 (2022 11:33) (95/65 - 118/81)  BP(mean): --  RR: 18 (2022 11:33) (16 - 19)  SpO2: 96% (:33) (94% - 98%)  Daily     Daily Weight in k.5 (2022 08:22)    GENERAL:  No acute distress  HEENT:  Normocephalic/atraumatic, no scleral icterus  CHEST:  Clear to auscultation bilaterally, no wheezes/rales/ronchi, no accessory muscle use  HEART:  Regular rate and rhythm, no murmurs/rubs/gallops  ABDOMEN:  Soft, non-tender, non-distended, normoactive bowel sounds,  no masses, no hepato-splenomegaly, no signs of chronic liver disease  EXTREMITIES: No cyanosis, clubbing, or edema  SKIN:  No rash/erythema/ecchymoses/petechiae/wounds/abscess/warm/dry  NEURO:  Alert and oriented x 3, no asterixis      LABS:                        15.0   15.85 )-----------( 315      ( 2022 06:39 )             46.1     Mean Cell Volume: 91.8 fl (22 @ 06:39)    -    136  |  102  |  33<H>  ----------------------------<  107<H>  4.6   |  21<L>  |  1.21    Ca    9.6      2022 06:47    TPro  7.8  /  Alb  4.1  /  TBili  0.8  /  DBili  x   /  AST  40  /  ALT  45  /  AlkPhos  74  06-01    LIVER FUNCTIONS - ( 2022 11:50 )  Alb: 4.1 g/dL / Pro: 7.8 g/dL / ALK PHOS: 74 U/L / ALT: 45 U/L / AST: 40 U/L / GGT: x                                       15.0   15.85 )-----------( 315      ( 2022 06:39 )             46.1                         16.3   14.28 )-----------( 311      ( 2022 11:50 )             50.6                         15.4   10.25 )-----------( 272      ( 31 May 2022 05:00 )             46.3       Imaging:

## 2022-06-02 NOTE — PROGRESS NOTE ADULT - PROBLEM SELECTOR PLAN 1
remains inotrope dependent on milrinone  off BB for now, cw hydral 25 tid, GDMT on hold pending improvement of hemodynamics  continue amio  undergoing eval for OHT and LVAD remains inotrope dependent on milrinone  off BB for now, cw hydral 25 tid, GDMT on hold pending improvement of hemodynamics  continue amio  undergoing eval for OHT and LVAD  planned for colonoscopy w/ possible RHC and IABP tomorrow

## 2022-06-02 NOTE — CHART NOTE - NSCHARTNOTEFT_GEN_A_CORE
66M h/o HTN, type 2 DM, HFrEF (EF 15%), complete heart block s/p PPM (in 2006, upgraded to BiV-ICD in 09/2016), CAD (s/p recent BERNABE mid LAD at [facility] on 04/18/2022), and stage II CKD (baseline Cr ~1.3) presented with near syncope to OSH found to have 41 episodes of VT on device, s/p unsuccessful VT ablation and transferred to [facility] for management of cardiogenic shock and advanced therapy, remains inotrope dependent on milrinone, undergoing eval for OHT and LVAD, planned for colonoscopy w/ possible RHC and IABP tomorrow. Hematology consulted for PCV eval:     #polycythemia eval  -check JAK2 w/ reflex, EPO level with AM labs   -additional to recs to follow once labs resulted  -full consult to follow in the AM     d/w Dr. Montelongo,     Reinaldo Shepherd Heme/onc PGY5

## 2022-06-02 NOTE — PROGRESS NOTE ADULT - SUBJECTIVE AND OBJECTIVE BOX
[facility] Division of Hospital Medicine  Simon Cardona MD  Pager (ANNELISE-F, 7P-9U): [phone]  Other Times:  [phone]    Patient is a 66y old  Male who presents with a chief complaint of LVAD/OHT eval (02 Jun 2022 12:46)      SUBJECTIVE / OVERNIGHT EVENTS: No fever, no pain.   ADDITIONAL REVIEW OF SYSTEMS: No cp or shortness of breath    MEDICATIONS  (STANDING):  allopurinol 100 milliGRAM(s) Oral daily  aMIOdarone    Tablet 200 milliGRAM(s) Oral daily  aspirin enteric coated 81 milliGRAM(s) Oral daily  atorvastatin 80 milliGRAM(s) Oral at bedtime  ceFAZolin   IVPB 1000 milliGRAM(s) IV Intermittent every 8 hours  chlorhexidine 4% Liquid 1 Application(s) Topical <User Schedule>  heparin   Injectable 5000 Unit(s) SubCutaneous every 8 hours  hydrALAZINE 25 milliGRAM(s) Oral every 8 hours  influenza   Vaccine 0.5 milliLiter(s) IntraMuscular once  insulin lispro (ADMELOG) corrective regimen sliding scale   SubCutaneous three times a day before meals  insulin lispro (ADMELOG) corrective regimen sliding scale   SubCutaneous at bedtime  lidocaine   4% Patch 1 Patch Transdermal daily  metoprolol succinate ER      metoprolol succinate ER 25 milliGRAM(s) Oral daily  milrinone Infusion 0.5 MICROgram(s)/kG/Min (11 mL/Hr) IV Continuous <Continuous>  pantoprazole    Tablet 40 milliGRAM(s) Oral before breakfast  polyethylene glycol 3350 17 Gram(s) Oral two times a day  polyethylene glycol/electrolyte Solution. 4000 milliLiter(s) Oral once  senna 2 Tablet(s) Oral at bedtime    MEDICATIONS  (PRN):      CAPILLARY BLOOD GLUCOSE      POCT Blood Glucose.: 101 mg/dL (02 Jun 2022 11:37)  POCT Blood Glucose.: 159 mg/dL (02 Jun 2022 08:19)  POCT Blood Glucose.: 149 mg/dL (01 Jun 2022 21:33)  POCT Blood Glucose.: 143 mg/dL (01 Jun 2022 17:26)    I&O's Summary    01 Jun 2022 07:01  -  02 Jun 2022 07:00  --------------------------------------------------------  IN: 1284 mL / OUT: 2500 mL / NET: -1216 mL    02 Jun 2022 07:01  -  02 Jun 2022 16:01  --------------------------------------------------------  IN: 1120 mL / OUT: 700 mL / NET: 420 mL        PHYSICAL EXAM:  Vital Signs Last 24 Hrs  T(C): 37.1 (02 Jun 2022 11:33), Max: 37.1 (02 Jun 2022 11:33)  T(F): 98.8 (02 Jun 2022 11:33), Max: 98.8 (02 Jun 2022 11:33)  HR: 80 (02 Jun 2022 11:33) (79 - 80)  BP: 97/61 (02 Jun 2022 11:33) (95/65 - 118/81)  BP(mean): --  RR: 18 (02 Jun 2022 11:33) (16 - 19)  SpO2: 96% (02 Jun 2022 11:33) (94% - 98%)  CONSTITUTIONAL: NAD, well-developed, well-groomed  EYES: EOMI; conjunctiva and sclera clear  ENMT: Moist oral mucosa, no pharyngeal injection or exudates  RESPIRATORY: Normal respiratory effort; lungs are clear to auscultation bilaterally  CARDIOVASCULAR: Regular rate and rhythm, normal S1 and S2, no murmur/rub/gallop; No lower extremity edema, LE ext cool  ABDOMEN: Nontender to palpation, normoactive bowel sounds, no rebound/guarding; No hepatosplenomegaly  PSYCH: A+O to person, place, and time; affect appropriate  NEUROLOGY: moving all extremities; no gross sensory deficits   SKIN: No rashes; no palpable lesions  LABS:                        15.0   15.85 )-----------( 315      ( 02 Jun 2022 06:39 )             46.1     06-02    136  |  102  |  33<H>  ----------------------------<  107<H>  4.6   |  21<L>  |  1.21    Ca    9.6      02 Jun 2022 06:47    TPro  7.8  /  Alb  4.1  /  TBili  0.8  /  DBili  x   /  AST  40  /  ALT  45  /  AlkPhos  74  06-01                RADIOLOGY & ADDITIONAL TESTS:  Results Reviewed:   Imaging Personally Reviewed:  Electrocardiogram Personally Reviewed:    COORDINATION OF CARE:  Care Discussed with Consultants/Other Providers [Y/N]:  Prior or Outpatient Records Reviewed [Y/N]:

## 2022-06-02 NOTE — PROGRESS NOTE ADULT - ASSESSMENT
65 y/o Camero[place] M PMHx mixed Ischemic/NICM cardiomyopathy (EF 25-30% at baseline, s/p BIV-ICD) and CAD, initially presented to [facility] with near syncope, found to intermittent episodes of VT and admitted to cardiac telemetry for further evaluation/management. S/p unsuccessful VT ablation. Now transferred to [facility] CCU for advanced therapies evaluation. S/p [place] placement in CCU, course c/b fever on 5/28, s/p subsequent [place] removal. Blood cultures no growth, no further fevers.     Last fever 100.6 (5/28)  Blood Cultures (5/28) no growth  Urinalysis unremarkable  CT Chest (5/28) mild pulmonary edema    Immunizations:  COVID Pfizer x2 (10/2021)    Pre-Transplant Labs:  HAV IgG+  HBsAb-, HBsAg-, HBcAb-   HCV Ab-  HSV 1/2 IgG +/-  EBV negative  CMV IgG+  Toxoplasma IgG-  VZV IgG+  Measles IgG+  Mumps IgG+   Rubella IgG-  Quantiferon Gold negative  HIV Test negative  Syphilis Screen negative  Strongyloides Ab negative    Impression:  #Fever - in setting of central line, now resolved s/p removal. Blood cultures no growth  #Leukocytosis - steroid-related  #Pre-Advanced Therapies Eval    Recs:  - no evidence of active infection  - no ID contraindication with proceeding with transplant listing  - will need first dose of hep B vaccine (Heplisav)  - will need Prevnar 20 vaccine      Sher Harris MD  Infectious Disease Fellow  Available on [employer] Teams  Before 9AM or after 5PM: [phone]      67 y/o Luxembour[place] M PMHx mixed Ischemic/NICM cardiomyopathy (EF 25-30% at baseline, s/p BIV-ICD) and CAD, initially presented to [facility] with near syncope, found to intermittent episodes of VT and admitted to cardiac telemetry for further evaluation/management. S/p unsuccessful VT ablation. Now transferred to [facility] CCU for advanced therapies evaluation. S/p [place] placement in CCU, course c/b fever on 5/28, s/p subsequent [place] removal. Blood cultures no growth, no further fevers.     Last fever 100.6 (5/28)  Blood Cultures (5/28) no growth  Urinalysis unremarkable  CT Chest (5/28) mild pulmonary edema    Immunizations:  COVID Pfizer x2 (10/2021)    Pre-Transplant Labs:  HAV IgG+  HBsAb-, HBsAg-, HBcAb-   HCV Ab-  HSV 1/2 IgG +/-  EBV negative  CMV IgG+  Toxoplasma IgG-  VZV IgG+  Measles IgG+  Mumps IgG+   Rubella IgG-  Quantiferon Gold negative  HIV Test negative  Syphilis Screen negative  Strongyloides Ab negative    Impression:  #Phlebitis  #Fever - in setting of central line, now resolved s/p removal. Blood cultures no growth  #Leukocytosis - 2/2 steroids vs phlebitis  #Pre-Advanced Therapies Eval    Recs:  - start cefazolin 1g IV q8H for phlebitis  - check US Duplex  - check blood cultures  - check MRSA PCR  - will need first dose of hep B vaccine (Heplisav)  - will need Prevnar 20 vaccine      Sher Harris MD  Infectious Disease Fellow  Available on [employer] Teams  Before 9AM or after 5PM: [phone]      65 y/o Azerbaijani M PMHx mixed Ischemic/NICM cardiomyopathy (EF 25-30% at baseline, s/p BIV-ICD) and CAD, initially presented to [facility] with near syncope, found to intermittent episodes of VT and admitted to cardiac telemetry for further evaluation/management. S/p unsuccessful VT ablation. Now transferred to [facility] CCU for advanced therapies evaluation. S/p [place] placement in CCU, course c/b fever on 5/28, s/p subsequent [place] removal. Blood cultures no growth, no further fevers.     Last fever 100.6 (5/28)  Blood Cultures (5/28) no growth  Urinalysis unremarkable  CT Chest (5/28) mild pulmonary edema    Immunizations:  COVID Pfizer x2 (10/2021)    Pre-Transplant Labs:  HAV IgG+  HBsAb-, HBsAg-, HBcAb-   HCV Ab-  HSV 1/2 IgG +/-  EBV negative  CMV IgG+  Toxoplasma IgG-  VZV IgG+  Measles IgG+  Mumps IgG+   Rubella IgG-  Quantiferon Gold negative  HIV Test negative  Syphilis Screen negative  Strongyloides Ab negative    Impression:  #Phlebitis  #Fever - in setting of central line, now resolved s/p removal. Blood cultures no growth  #Leukocytosis - 2/2 steroids vs phlebitis  #Pre-Advanced Therapies Eval    Recs:  - start cefazolin 1g IV q8H for phlebitis  - check UE US Duplex  - check blood cultures  - check MRSA PCR  - will need first dose of hep B vaccine (Heplisav)  - will need Prevnar 20 vaccine      Sher Harris MD  Infectious Disease Fellow  Available on [employer] Teams  Before 9AM or after 5PM: [phone]

## 2022-06-02 NOTE — PROGRESS NOTE ADULT - PROBLEM SELECTOR PLAN 7
- 100.6 fever on 5/28, cultures NGTD  - leukocytosis remains elevated, though afebrile. May be related to course of steroids  - right arm ? phlebitis, will need ID to weigh in and determine if patient requires abx    - Transplant ID continued, appreciate recommendations

## 2022-06-02 NOTE — PROGRESS NOTE ADULT - SUBJECTIVE AND OBJECTIVE BOX
Behavioral Cardiology Progress Note    History of present illness: Mr. Du is a 66-year-old male, PMH of HTN, HLD, type 2 DM, chronic HFrEF (25-30%), complete heart block s/p PPM, BiV-ICD, CAD (s/p recent BERNABE mid LAD at [facility] on 2022), and stage II CKD, admitted for intermittent vtach, now s/p unsuccessful VT ablation. Patient transferred from [facility] to [facility] for further management and evaluation for LVAD vs. OHT.     Social history: Patient lives with his wife (Teresa, 68 y/o, [phone]) and 24 y/o daughter (Priyanka) in [place], [place], in private home. Patient has two other adult sons (Philip, age 35; and Aldo, age 30) who live in [place] and [place]. Patient’s mother  in her 80s of natural causes and father  in his 80s from a heart attack. Patient has four siblings who live in the area and who he is close to. Patient is a citizen, [place] from [facility] approximately 40 years ago. 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 Education: high school.    Substance use:   Tobacco: Former smoker, approximately 1-2 cigarettes/day from age 21 until 2006 at time of his heart block. No use since that time.    Alcohol: Denies current alcohol use. Last time he used alcohol was prior to 2021. Before that time, he used to drink approximately 4 beers at a time, 2-3 times/week.   Drug: Denies current or past substance use.

## 2022-06-02 NOTE — PROGRESS NOTE ADULT - ASSESSMENT
Past psychiatric history: Denied.      Psychological assessment:     Mental Status Exam: Seen sitting in chair on 2 DSU. Alert, oriented x4. Appropriate eye contact. Speech normal volume and rate. Thought process was logical and goal-oriented, content appropriate to conversation. No evidence of jack, delusions, psychosis. Denies SI/HI. Mood is euthymic. Affect is full range. Insight into disease is adequate. Immediate judgment good.      Dx: Adjustment disorder, unspecified. F43.20  Systolic heart failure, I50.2    Recommendations:   Behavioral Cardiology will follow as needed.    service  Would benefit from repeat memory assessment  Needs additional education on heart failure guidelines, as well as LVAD and HT  Would benefit from involving family members in communication regarding medical status and recommendations  Patient would benefit from continued involvement from behavioral cardiology      30 minutes spent on patient encounter       Past psychiatric history: Denied.      Psychological assessment: Endorsed periods of worry related to need for advanced therapies. Rohini by talking with family. Sleeping well. Appetite good. Denies symptoms of depression. Talked about family meeting held yesterday. Understands need for advanced therapies given severity of his condition. Reports his family is very supportive. Reviewed strategies for managing stress.     Cognitive screen: Attempted to perform repeat brief cognitive screen given fact that previous assessment was below cutoff. After a few minutes patient requested screen be completed at another time given he was doing prep for colonoscopy.      Mental Status Exam: Seen sitting in chair on 2 DSU. Alert, oriented x4. Appropriate eye contact. Speech normal volume and rate. Thought process was logical and goal-oriented, content appropriate to conversation. No evidence of jack, delusions, psychosis. Denies SI/HI. Mood neutral. Affect full range. Insight into disease adequate. Immediate judgment good.      Dx: Adjustment disorder, unspecified. F43.20  Systolic heart failure, I50.2    Recommendations:   Behavioral Cardiology will follow as needed.    service  Would benefit from repeat memory assessment                                                       Needs additional education on heart failure guidelines, as well as LVAD and HT  Would benefit from involving family members in communication regarding medical status and recommendations  Patient would benefit from continued involvement from behavioral cardiology      30 minutes spent on patient encounter

## 2022-06-02 NOTE — PROGRESS NOTE ADULT - ASSESSMENT
66M with a history of ACC/AHA Stage C->D mixed NICM/ICM (likely familial with strong FH and early arrhythmia history in his 30's) with LVED 5.2 cm and LVEF 10-15% s/p PPM upgraded to CRT-D, CAD s/p PCI to mLAD 4/2022, well controlled DM2 (A1c 6.2%), and CKD III (Cr 1.4) who initially presented to [facility] 5/20 with near syncope in setting of worsening HF symptoms and found to have 41 episodes of VT, many terminating with ATP. LHC did not reveal new obstructive CAD and her underwent EPS which did not reveal endocardial substrate amenable for ablation. RHC revealed severely depressed cardiac output and he was transferred to [facility] 5/26 for advanced therapies evaluation. Transplant hepatology consulted to evaluate for cirrhosis prior to advanced HF therapies.    Impression:  #Pre-advanced HF therapies evaluation: pt with normal liver enzymes. Hep B non-immune, HCV AB negative. Normal liver on US abd. Low suspicion for fibrosis, confirmed on US fibroscan to have minimal risk of clinically significant fibrosis.     Recommendations:   - No contraindication per hepatology to proceed with advanced HF therapies (minimal risk of clinically significant fibrosis)  - HBV vaccination    Thank you for involving us in the care of this patient, please reach out if any further questions.     Frank Lombardi MD  Gastroenterology/Hepatology Fellow, PGY5    Available on [employer] Teams  [phone] ([facility])  [phone] ([facility])  Please contact on call fellow weekdays after 5pm-7am and weekends: [phone]

## 2022-06-02 NOTE — PHARMACOTHERAPY INTERVENTION NOTE - COMMENTS
67 y/o male is being evaluated for heart transplant.     Outpatient medications were reviewed and will be re-started appropriately.  Plan discussed with multidisciplinary heart transplant team.     The patient has no contraindications from pharmacy standpoint and is cleared to receive a heart transplant. Will encourage to obtain pertinent vaccines prior to the transplant surgery.     Shanita Richardson, Pharm.D.  [phone]

## 2022-06-02 NOTE — PROGRESS NOTE ADULT - SUBJECTIVE AND OBJECTIVE BOX
Subjective: Patient seen and examined resting in bed. ON no issues. Is on CLD for colonoscopy tomorrow     Medications:  allopurinol 100 milliGRAM(s) Oral daily  aMIOdarone    Tablet 200 milliGRAM(s) Oral daily  aspirin enteric coated 81 milliGRAM(s) Oral daily  atorvastatin 80 milliGRAM(s) Oral at bedtime  chlorhexidine 4% Liquid 1 Application(s) Topical <User Schedule>  heparin   Injectable 5000 Unit(s) SubCutaneous every 8 hours  hydrALAZINE 25 milliGRAM(s) Oral every 8 hours  influenza   Vaccine 0.5 milliLiter(s) IntraMuscular once  insulin lispro (ADMELOG) corrective regimen sliding scale   SubCutaneous three times a day before meals  insulin lispro (ADMELOG) corrective regimen sliding scale   SubCutaneous at bedtime  lidocaine   4% Patch 1 Patch Transdermal daily  metoprolol succinate ER      metoprolol succinate ER 25 milliGRAM(s) Oral daily  milrinone Infusion 0.5 MICROgram(s)/kG/Min IV Continuous <Continuous>  pantoprazole    Tablet 40 milliGRAM(s) Oral before breakfast  polyethylene glycol 3350 17 Gram(s) Oral two times a day  polyethylene glycol/electrolyte Solution. 4000 milliLiter(s) Oral once  senna 2 Tablet(s) Oral at bedtime      Vitals:  Vital Signs Last 24 Hours  T(C): 37.1 (22 @ 11:33), Max: 37.1 (22 @ 11:33)  HR: 80 (22 @ 11:33) (79 - 80)  BP: 97/61 (22 @ 11:33) (95/65 - 118/81)  RR: 18 (22 @ 11:33) (16 - 19)  SpO2: 96% (22 @ 11:33) (94% - 98%)    Weight in k.5 ( @ 08:22)    I&O's Summary    2022 07:01  -  2022 07:00  --------------------------------------------------------  IN: 1284 mL / OUT: 2500 mL / NET: -1216 mL    2022 07:01  -  2022 12:49  --------------------------------------------------------  IN: 560 mL / OUT: 400 mL / NET: 160 mL        Physical Exam  General: No distress. Comfortable.  Neck: Neck supple. JVP not elevated. No masses  Chest: Clear to auscultation bilaterally  CV: Normal S1 and S2. No murmurs, rub, or gallops. Radial pulses normal.  Abdomen: Soft, non-distended, non-tender  Neurology: Alert and oriented times three.     Labs:                        15.0   15.85 )-----------( 315      ( 2022 06:39 )             46.1         136  |  102  |  33<H>  ----------------------------<  107<H>  4.6   |  21<L>  |  1.21    Ca    9.6      2022 06:47    TPro  7.8  /  Alb  4.1  /  TBili  0.8  /  DBili  x   /  AST  40  /  ALT  45  /  AlkPhos  74  06-          Serum Pro-Brain Natriuretic Peptide: 3102 pg/mL ( @ 15:23)  Serum Pro-Brain Natriuretic Peptide: 4206 pg/mL ( @ 20:22)        Lactate, Blood: 1.4 mmol/L ( @ 05:00)

## 2022-06-02 NOTE — PROVIDER CONTACT NOTE (MEDICATION) - ACTION/TREATMENT ORDERED:
RAMSES Balbuena made aware and ordered to reschedule metoprolol and hydralazine to 8 AM and reassess VS

## 2022-06-03 ENCOUNTER — RESULT REVIEW (OUTPATIENT)
Age: 67
End: 2022-06-03

## 2022-06-03 DIAGNOSIS — D75.1 SECONDARY POLYCYTHEMIA: ICD-10-CM

## 2022-06-03 LAB
ANION GAP SERPL CALC-SCNC: 12 MMOL/L — SIGNIFICANT CHANGE UP (ref 5–17)
BUN SERPL-MCNC: 22 MG/DL — SIGNIFICANT CHANGE UP (ref 7–23)
CALCIUM SERPL-MCNC: 9.9 MG/DL — SIGNIFICANT CHANGE UP (ref 8.4–10.5)
CHLORIDE SERPL-SCNC: 101 MMOL/L — SIGNIFICANT CHANGE UP (ref 96–108)
CO2 SERPL-SCNC: 25 MMOL/L — SIGNIFICANT CHANGE UP (ref 22–31)
CREAT SERPL-MCNC: 1.25 MG/DL — SIGNIFICANT CHANGE UP (ref 0.5–1.3)
EGFR: 64 ML/MIN/1.73M2 — SIGNIFICANT CHANGE UP
GLUCOSE BLDC GLUCOMTR-MCNC: 104 MG/DL — HIGH (ref 70–99)
GLUCOSE BLDC GLUCOMTR-MCNC: 106 MG/DL — HIGH (ref 70–99)
GLUCOSE BLDC GLUCOMTR-MCNC: 136 MG/DL — HIGH (ref 70–99)
GLUCOSE SERPL-MCNC: 78 MG/DL — SIGNIFICANT CHANGE UP (ref 70–99)
HCT VFR BLD CALC: 50 % — SIGNIFICANT CHANGE UP (ref 39–50)
HGB BLD-MCNC: 16.4 G/DL — SIGNIFICANT CHANGE UP (ref 13–17)
MCHC RBC-ENTMCNC: 30.2 PG — SIGNIFICANT CHANGE UP (ref 27–34)
MCHC RBC-ENTMCNC: 32.8 GM/DL — SIGNIFICANT CHANGE UP (ref 32–36)
MCV RBC AUTO: 92.1 FL — SIGNIFICANT CHANGE UP (ref 80–100)
NRBC # BLD: 0 /100 WBCS — SIGNIFICANT CHANGE UP (ref 0–0)
PLATELET # BLD AUTO: 347 K/UL — SIGNIFICANT CHANGE UP (ref 150–400)
POTASSIUM SERPL-MCNC: 4.4 MMOL/L — SIGNIFICANT CHANGE UP (ref 3.5–5.3)
POTASSIUM SERPL-SCNC: 4.4 MMOL/L — SIGNIFICANT CHANGE UP (ref 3.5–5.3)
RBC # BLD: 5.43 M/UL — SIGNIFICANT CHANGE UP (ref 4.2–5.8)
RBC # FLD: 16.1 % — HIGH (ref 10.3–14.5)
SODIUM SERPL-SCNC: 138 MMOL/L — SIGNIFICANT CHANGE UP (ref 135–145)
T PALLIDUM AB TITR SER: NEGATIVE — SIGNIFICANT CHANGE UP
WBC # BLD: 15.47 K/UL — HIGH (ref 3.8–10.5)
WBC # FLD AUTO: 15.47 K/UL — HIGH (ref 3.8–10.5)

## 2022-06-03 PROCEDURE — 99232 SBSQ HOSP IP/OBS MODERATE 35: CPT | Mod: GC

## 2022-06-03 PROCEDURE — 94010 BREATHING CAPACITY TEST: CPT | Mod: 26

## 2022-06-03 PROCEDURE — 45385 COLONOSCOPY W/LESION REMOVAL: CPT

## 2022-06-03 PROCEDURE — 88305 TISSUE EXAM BY PATHOLOGIST: CPT | Mod: 26

## 2022-06-03 PROCEDURE — 45380 COLONOSCOPY AND BIOPSY: CPT | Mod: 59

## 2022-06-03 PROCEDURE — 99233 SBSQ HOSP IP/OBS HIGH 50: CPT

## 2022-06-03 PROCEDURE — 93971 EXTREMITY STUDY: CPT | Mod: 26,RT

## 2022-06-03 PROCEDURE — 99222 1ST HOSP IP/OBS MODERATE 55: CPT

## 2022-06-03 DEVICE — KIT CVC 2LUM MAC 9FR CHG: Type: IMPLANTABLE DEVICE | Status: FUNCTIONAL

## 2022-06-03 DEVICE — CLIP RESOLUTION 360 235CM: Type: IMPLANTABLE DEVICE | Status: FUNCTIONAL

## 2022-06-03 DEVICE — NET RETRV ROT ROTH 2.5MMX230CM: Type: IMPLANTABLE DEVICE | Status: FUNCTIONAL

## 2022-06-03 DEVICE — CATH THERMODIL PACE 7.5FR: Type: IMPLANTABLE DEVICE | Status: FUNCTIONAL

## 2022-06-03 RX ORDER — SODIUM CHLORIDE 9 MG/ML
500 INJECTION INTRAMUSCULAR; INTRAVENOUS; SUBCUTANEOUS
Refills: 0 | Status: DISCONTINUED | OUTPATIENT
Start: 2022-06-03 | End: 2022-06-06

## 2022-06-03 RX ADMIN — Medication 25 MILLIGRAM(S): at 13:22

## 2022-06-03 RX ADMIN — Medication 20 MICROGRAM(S): at 06:31

## 2022-06-03 RX ADMIN — Medication 25 MILLIGRAM(S): at 06:19

## 2022-06-03 RX ADMIN — AMIODARONE HYDROCHLORIDE 200 MILLIGRAM(S): 400 TABLET ORAL at 06:18

## 2022-06-03 RX ADMIN — MILRINONE LACTATE 11 MICROGRAM(S)/KG/MIN: 1 INJECTION, SOLUTION INTRAVENOUS at 15:00

## 2022-06-03 RX ADMIN — PANTOPRAZOLE SODIUM 40 MILLIGRAM(S): 20 TABLET, DELAYED RELEASE ORAL at 06:19

## 2022-06-03 RX ADMIN — Medication 100 MILLIGRAM(S): at 06:18

## 2022-06-03 RX ADMIN — Medication 81 MILLIGRAM(S): at 13:22

## 2022-06-03 RX ADMIN — Medication 100 MILLIGRAM(S): at 13:30

## 2022-06-03 RX ADMIN — MILRINONE LACTATE 11 MICROGRAM(S)/KG/MIN: 1 INJECTION, SOLUTION INTRAVENOUS at 21:20

## 2022-06-03 RX ADMIN — HEPARIN SODIUM 5000 UNIT(S): 5000 INJECTION INTRAVENOUS; SUBCUTANEOUS at 06:18

## 2022-06-03 RX ADMIN — HEPARIN SODIUM 5000 UNIT(S): 5000 INJECTION INTRAVENOUS; SUBCUTANEOUS at 13:21

## 2022-06-03 RX ADMIN — ATORVASTATIN CALCIUM 80 MILLIGRAM(S): 80 TABLET, FILM COATED ORAL at 21:20

## 2022-06-03 RX ADMIN — HEPARIN SODIUM 5000 UNIT(S): 5000 INJECTION INTRAVENOUS; SUBCUTANEOUS at 21:20

## 2022-06-03 RX ADMIN — Medication 100 MILLIGRAM(S): at 21:21

## 2022-06-03 RX ADMIN — Medication 100 MILLIGRAM(S): at 13:21

## 2022-06-03 NOTE — PROGRESS NOTE ADULT - SUBJECTIVE AND OBJECTIVE BOX
[facility] Division of Hospital Medicine  Simon Cardona MD  Pager (ANNELISE-DEBORAH, 1O-2U): [phone]  Other Times:  [phone]    Patient is a 66y old  Male who presents with a chief complaint of LVAD/OHT eval (03 Jun 2022 13:27)      SUBJECTIVE / OVERNIGHT EVENTS: No acute events. No cp or palpitations. Underwent colonoscopy w/o issue, path pending.  ADDITIONAL REVIEW OF SYSTEMS: No fever.    MEDICATIONS  (STANDING):  allopurinol 100 milliGRAM(s) Oral daily  aMIOdarone    Tablet 200 milliGRAM(s) Oral daily  aspirin enteric coated 81 milliGRAM(s) Oral daily  atorvastatin 80 milliGRAM(s) Oral at bedtime  ceFAZolin   IVPB 1000 milliGRAM(s) IV Intermittent every 8 hours  chlorhexidine 4% Liquid 1 Application(s) Topical <User Schedule>  heparin   Injectable 5000 Unit(s) SubCutaneous every 8 hours  hydrALAZINE 25 milliGRAM(s) Oral every 8 hours  influenza   Vaccine 0.5 milliLiter(s) IntraMuscular once  insulin lispro (ADMELOG) corrective regimen sliding scale   SubCutaneous three times a day before meals  insulin lispro (ADMELOG) corrective regimen sliding scale   SubCutaneous at bedtime  lidocaine   4% Patch 1 Patch Transdermal daily  metoprolol succinate ER      metoprolol succinate ER 25 milliGRAM(s) Oral daily  milrinone Infusion 0.5 MICROgram(s)/kG/Min (11 mL/Hr) IV Continuous <Continuous>  pantoprazole    Tablet 40 milliGRAM(s) Oral before breakfast  polyethylene glycol 3350 17 Gram(s) Oral two times a day  senna 2 Tablet(s) Oral at bedtime  sodium chloride 0.9%. 500 milliLiter(s) (30 mL/Hr) IV Continuous <Continuous>    MEDICATIONS  (PRN):      CAPILLARY BLOOD GLUCOSE      POCT Blood Glucose.: 92 mg/dL (02 Jun 2022 21:46)  POCT Blood Glucose.: 107 mg/dL (02 Jun 2022 16:50)  POCT Blood Glucose.: 101 mg/dL (02 Jun 2022 16:39)    I&O's Summary    02 Jun 2022 07:01  -  03 Jun 2022 07:00  --------------------------------------------------------  IN: 1852 mL / OUT: 1600 mL / NET: 252 mL        PHYSICAL EXAM:  Vital Signs Last 24 Hrs  T(C): 36.5 (03 Jun 2022 07:59), Max: 36.5 (02 Jun 2022 20:32)  T(F): 97.7 (03 Jun 2022 07:45), Max: 97.7 (02 Jun 2022 20:32)  HR: 92 (03 Jun 2022 09:52) (82 - 98)  BP: 107/83 (03 Jun 2022 09:52) (94/73 - 127/89)  BP(mean): --  RR: 19 (03 Jun 2022 09:52) (17 - 20)  SpO2: 95% (03 Jun 2022 09:52) (94% - 100%)  CONSTITUTIONAL: NAD, well-developed, well-groomed  EYES: EOMI; conjunctiva and sclera clear  ENMT: Moist oral mucosa, no pharyngeal injection or exudates  RESPIRATORY: Normal respiratory effort; lungs are clear to auscultation bilaterally  CARDIOVASCULAR: Regular rate and rhythm, normal S1 and S2, no murmur/rub/gallop; No lower extremity edema, LE ext cool  ABDOMEN: Nontender to palpation, normoactive bowel sounds, no rebound/guarding; No hepatosplenomegaly  PSYCH: A+O to person, place, and time; affect appropriate  NEUROLOGY: moving all extremities; no gross sensory deficits   SKIN: No rashes; no palpable lesions    LABS:                        16.4   15.47 )-----------( 347      ( 03 Jun 2022 06:04 )             50.0     06-03    138  |  101  |  22  ----------------------------<  78  4.4   |  25  |  1.25    Ca    9.9      03 Jun 2022 06:02                  RADIOLOGY & ADDITIONAL TESTS:  Results Reviewed:   Imaging Personally Reviewed:  Electrocardiogram Personally Reviewed:    COORDINATION OF CARE:  Care Discussed with Consultants/Other Providers [Y/N]:  Prior or Outpatient Records Reviewed [Y/N]:

## 2022-06-03 NOTE — CONSULT NOTE ADULT - SUBJECTIVE AND OBJECTIVE BOX
HPI:  66M Mixed Ischemic/NICM Cardiomyopathy (EF 25-30% at baseline, s/p BIV-ICD), CAD (medically managed MIs in 2008,2011, Most recent stent in April 2022 to mLAD) presented with multiple near syncope, found to have 41 episodes of VT and admitted initially to cardiac telemetry for further evaluation/management. Ischemic evaluation without new disease and VT ablation without substrate to complete ablation.   Transferred from [facility] for further management and evaluation for LVAD vs OHT.     Allergies  No Known Allergies    MEDICATIONS  (STANDING):  allopurinol 100 milliGRAM(s) Oral daily  aMIOdarone    Tablet 200 milliGRAM(s) Oral daily  aspirin enteric coated 81 milliGRAM(s) Oral daily  atorvastatin 80 milliGRAM(s) Oral at bedtime  ceFAZolin   IVPB 1000 milliGRAM(s) IV Intermittent every 8 hours  chlorhexidine 4% Liquid 1 Application(s) Topical <User Schedule>  heparin   Injectable 5000 Unit(s) SubCutaneous every 8 hours  hydrALAZINE 25 milliGRAM(s) Oral every 8 hours  influenza   Vaccine 0.5 milliLiter(s) IntraMuscular once  insulin lispro (ADMELOG) corrective regimen sliding scale   SubCutaneous three times a day before meals  insulin lispro (ADMELOG) corrective regimen sliding scale   SubCutaneous at bedtime  lidocaine   4% Patch 1 Patch Transdermal daily  metoprolol succinate ER      metoprolol succinate ER 25 milliGRAM(s) Oral daily  milrinone Infusion 0.5 MICROgram(s)/kG/Min (11 mL/Hr) IV Continuous <Continuous>  pantoprazole    Tablet 40 milliGRAM(s) Oral before breakfast  polyethylene glycol 3350 17 Gram(s) Oral two times a day  senna 2 Tablet(s) Oral at bedtime  sodium chloride 0.9%. 500 milliLiter(s) (30 mL/Hr) IV Continuous <Continuous>    MEDICATIONS  (PRN):      PAST MEDICAL & SURGICAL HISTORY:  AV block      Essential hypertension      Chronic HFrEF (heart failure with reduced ejection fraction)      Chronic kidney disease, unspecified CKD stage      CAD (coronary artery disease)      HLD (hyperlipidemia)      Type 2 diabetes mellitus      Artificial cardiac pacemaker          FAMILY HISTORY:  Family history of acute myocardial infarction (Father)  72        SOCIAL HISTORY: No EtOH, no tobacco    REVIEW OF SYSTEMS:    CONSTITUTIONAL: no fever  EYES/ENT: No visual changes;  no throat pain   NECK: No pain or stiffness  RESPIRATORY: no sob  CARDIOVASCULAR: see above hpi   GASTROINTESTINAL: No abdominal or epigastric pain. No NV/D/C  GENITOURINARY: No dysuria, change in frequency or hematuria  NEUROLOGICAL: No numbness or weakness  SKIN: No itching, burning, rashes, or lesions   MSK: no joint pain  Allergy: no urticaria     Height (cm): 177.8 (06-03 @ 07:59)  Weight (kg): 72.9 (06-03 @ 07:59)  BMI (kg/m2): 23.1 (06-03 @ 07:59)  BSA (m2): 1.9 (06-03 @ 07:59)    T(F): 97.7 (06-03-22 @ 07:45), Max: 98.8 (06-02-22 @ 11:33)  HR: 95 (06-03-22 @ 07:59)  BP: 115/88 (06-03-22 @ 07:59)  RR: 17 (06-03-22 @ 07:59)  SpO2: 96% (06-03-22 @ 07:59)  Wt(kg): --    GENERAL: NAD  HEENT: EOMI, MMM,   Pulm: no inc wob  CV: RRR  ABDOMEN: soft, nt, nd  MSK: nl ROM  EXTREMITIES:  no appreciable edema in b/l LE  Neuro: A&Ox3, no focal deficits  SKIN: warm and dry, no visible rash                          16.4   15.47 )-----------( 347      ( 03 Jun 2022 06:04 )             50.0       06-03    138  |  101  |  22  ----------------------------<  78  4.4   |  25  |  1.25    Ca    9.9      03 Jun 2022 06:02    TPro  7.8  /  Alb  4.1  /  TBili  0.8  /  DBili  x   /  AST  40  /  ALT  45  /  AlkPhos  74  06-01

## 2022-06-03 NOTE — PROGRESS NOTE ADULT - PROBLEM SELECTOR PLAN 1
remains inotrope dependent on milrinone  off BB for now, cw hydral 25 tid, GDMT on hold pending improvement of hemodynamics  continue amio  undergoing eval for OHT and LVAD  s/p colonoscopy w 5 polyps. Path pending  Possible RHC w/ IABP planned for Monday 6/6

## 2022-06-03 NOTE — PROGRESS NOTE ADULT - PROBLEM SELECTOR PLAN 5
- hx of VT s/p unsuccessful VT ablation  - continue on amio 200 mg PO QD  - since being admitted to [facility] has not had any episodes of VT, currently tolerating high dose milrinone.

## 2022-06-03 NOTE — PROGRESS NOTE ADULT - ASSESSMENT
65 YO M with a history of ACC/AHA 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 LHC did not reveal new obstructive CAD and her underwent EPS which did not reveal endocardial substrate amenable for ablation. RHC revealed severely depressed cardiac output and he was transferred to [facility] 5/26 for advanced therapies evaluation.    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 He has improved significantly with sequential uptitration of inotropes and increased pacing rate. He is INTERMACS 3 and SCAI stage B. He will likely need advanced therapies this admission. He has significant pulmonary hypertension but PVR is reversible with nipride and suspect would improve with LV unloading given severely elevated PCWP despite euvolemia and low diastolic pulmonary gradient. Pending results the results from his polypectomy will determine if patient can be listed for OHT.   Review of studies  TTE 5/21: LV 5.2 cm, LVEF 10-15%, LVOT VTI 10 cm, moderate RV dysfunction, mild AI, minimal MR  EKG: a-BiV paced  [facility] 5/23: patent mLAD stent with slow flow, D1 with 40-50% stenosis, mild disease otherwise  [facility] 5/26: RA 12, PA 70/40 (50), PCWP 22, Milana CO/CI 2.3/1.3, MAP 83 with SVR 2469, PVR 12 PERSAUD    Hemodynamics  5/27 (milrinone 0.25): RA 10, PA 76/35 (49), PCWP 34, PA sat 57% with Milana CO/CI 3.1/1.6, MAP 71 with SVR 1574, PVR 4.8  5/28 (on milrinone 0.375): RA 7, PA 63/31, PCWP 26, PA sat 72.8% with Milana CO/CI 4.9/2.5 (CI later dropped to 1.6 in the afternoon), MAP 70 with SVR 1039, PVR 2.9  5/29 (on milrinone 0.5): RA 8, PA 75/32 (50), PCWP 28, PA sat 74% with Milana CO/CI 5.0/2.6, MAP 77 with SVR 1200, PVR 4.4  5/29 (on milrinone 0.5 and nipride to 3 mcg/kg/min): RA 6, PA 59/31 (40), PCWP 30, PA sat 79% with Milana CO/CI 7.0/3.6, BP 97/57 (MAP 70) with , PVR 1.4   67 YO M with a history of ACC/AHA Stage C->D mixed NICM/ICM (likely familial with strong FH and early arrhythmia history in his 30's) with LVED 5.2 cm and LVEF 10-15% s/p PPM upgraded to CRT-D, CAD s/p PCI to mLAD 4/2022, well controlled DM2 (A1c 6.2%), and CKD III (Cr 1.4) who initially presented to [facility] 5/20 with near syncope in setting of worsening HF symptoms and found to have 41 episodes of VT, many terminating with ATP. LHC did not reveal new obstructive CAD and her underwent EPS which did not reveal endocardial substrate amenable for ablation. RHC revealed severely depressed cardiac output and he was transferred to [facility] 5/26 for advanced therapies evaluation.    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 He has improved significantly with sequential uptitration of inotropes and increased pacing rate. He is INTERMACS 3 and SCAI stage B. He will likely need advanced therapies this admission. He has significant pulmonary hypertension but PVR is reversible with nipride and suspect would improve with LV unloading given severely elevated PCWP despite euvolemia and low diastolic pulmonary gradient. Pending results the results from his polypectomy will determine if patient can be listed for OHT.     Review of studies  TTE 5/21: LV 5.2 cm, LVEF 10-15%, LVOT VTI 10 cm, moderate RV dysfunction, mild AI, minimal MR  EKG: a-BiV paced  [facility] 5/23: patent mLAD stent with slow flow, D1 with 40-50% stenosis, mild disease otherwise  [facility] 5/26: RA 12, PA 70/40 (50), PCWP 22, Milana CO/CI 2.3/1.3, MAP 83 with SVR 2469, PVR 12 PERSAUD    Hemodynamics  5/27 (milrinone 0.25): RA 10, PA 76/35 (49), PCWP 34, PA sat 57% with Milana CO/CI 3.1/1.6, MAP 71 with SVR 1574, PVR 4.8  5/28 (on milrinone 0.375): RA 7, PA 63/31, PCWP 26, PA sat 72.8% with Milana CO/CI 4.9/2.5 (CI later dropped to 1.6 in the afternoon), MAP 70 with SVR 1039, PVR 2.9  5/29 (on milrinone 0.5): RA 8, PA 75/32 (50), PCWP 28, PA sat 74% with Milana CO/CI 5.0/2.6, MAP 77 with SVR 1200, PVR 4.4  5/29 (on milrinone 0.5 and nipride to 3 mcg/kg/min): RA 6, PA 59/31 (40), PCWP 30, PA sat 79% with Milana CO/CI 7.0/3.6, BP 97/57 (MAP 70) with , PVR 1.4

## 2022-06-03 NOTE — CONSULT NOTE ADULT - ASSESSMENT
66M h/o HTN, type 2 DM, HFrEF (EF 15%), complete heart block s/p PPM (in 2006, upgraded to BiV-ICD in 09/2016), CAD (s/p recent BERNABE mid LAD at [facility] on 04/18/2022), and stage II CKD (baseline Cr ~1.3) presented with near syncope to OSH found to have 41 episodes of VT on device, s/p unsuccessful VT ablation and transferred to [facility] for management of cardiogenic shock and advanced therapy, remains inotrope dependent on milrinone, undergoing eval for OHT and LVAD, planned for colonoscopy w/ possible RHC and IABP tomorrow. Hematology consulted for PCV eval:     #polycythemia eval  -check JAK2 w/ reflex, EPO level with AM labs   -additional to recs to follow once labs resulted    d/w Dr. Montelongo,     Reinaldo Sehpherd Heme/onc PGY5.

## 2022-06-03 NOTE — PROGRESS NOTE ADULT - ASSESSMENT
65 y/o male w/ PMHx HTN, HLD, type 2 DM, HFrEF (EF 15%), complete heart block s/p PPM (in 2006, upgraded to BiV-ICD in 09/2016), CAD (s/p recent BERNABE mid LAD at [facility] on 04/18/2022), and stage II CKD (baseline Cr ~1.3) presented with near syncope to OSH found to have 41 episodes of VT on device, s/p unsuccessful VT ablation and transferred to [facility] for management of cardiogenic shock and advanced therapy eval.

## 2022-06-03 NOTE — PROGRESS NOTE ADULT - PROBLEM SELECTOR PLAN 7
- 100.6 fever on 5/28, cultures NGTD  - Transplant ID continued, appreciate recommendations  - leukocytosis remains elevated, though afebrile.   - right arm concerning for phlebitis, was started on cefazolin IV  - US pending  - blood cultures pending

## 2022-06-03 NOTE — PROGRESS NOTE ADULT - ASSESSMENT
65 y/o Ghanaian M PMHx mixed Ischemic/NICM cardiomyopathy (EF 25-30% at baseline, s/p BIV-ICD) and CAD, initially presented to [facility] with near syncope, found to intermittent episodes of VT and admitted to cardiac telemetry for further evaluation/management. S/p unsuccessful VT ablation. Now transferred to [facility] CCU for advanced therapies evaluation. S/p Vinton placement in CCU, course c/b fever on 5/28, s/p subsequent [place] removal. Blood cultures no growth, no further fevers.     Last fever 100.6 (5/28)  Blood Cultures (5/28) no growth  Urinalysis unremarkable  CT Chest (5/28) mild pulmonary edema    Immunizations:  COVID Pfizer x2 (10/2021)    Pre-Transplant Labs:  HAV IgG+  HBsAb-, HBsAg-, HBcAb-   HCV Ab-  HSV 1/2 IgG +/-  EBV negative  CMV IgG+  Toxoplasma IgG-  VZV IgG+  Measles IgG+  Mumps IgG+   Rubella IgG-  Quantiferon Gold negative  HIV Test negative  Syphilis Screen negative  Strongyloides Ab negative    Impression:  #Phlebitis  #Fever - in setting of central line, now resolved s/p removal. Blood cultures no growth  #Leukocytosis - 2/2 steroids vs phlebitis  #Pre-Advanced Therapies Eval    Recs:  - c/w cefazolin 1g IV q8H for RUE phlebitis  - f/u RUE US Duplex  - f/u blood cultures  - s/p first dose of hep B vaccine on 6/3/22  - will need Prevnar 20 vaccine      Sher Harris MD  Infectious Disease Fellow  Available on [employer] Teams  Before 9AM or after 5PM: [phone]      67 y/o Malaysian M PMHx mixed Ischemic/NICM cardiomyopathy (EF 25-30% at baseline, s/p BIV-ICD) and CAD, initially presented to [facility] with near syncope, found to intermittent episodes of VT and admitted to cardiac telemetry for further evaluation/management. S/p unsuccessful VT ablation. Now transferred to [facility] CCU for advanced therapies evaluation. S/p McCrory placement in CCU, course c/b fever on 5/28, s/p subsequent [place] removal. Blood cultures no growth, no further fevers.     Last fever 100.6 (5/28)  Blood Cultures (5/28) no growth  Urinalysis unremarkable  CT Chest (5/28) mild pulmonary edema    Immunizations:  COVID Pfizer x2 (10/2021)    Pre-Transplant Labs:  HAV IgG+  HBsAb-, HBsAg-, HBcAb-   HCV Ab-  HSV 1/2 IgG +/-  EBV negative  CMV IgG+  Toxoplasma IgG-  VZV IgG+  Measles IgG+  Mumps IgG+   Rubella IgG-  Quantiferon Gold negative  HIV Test negative  Syphilis Screen negative  Strongyloides Ab negative    Impression:  #RUE Thrombophlebitis  #Fever - in setting of central line, now resolved s/p removal. Blood cultures no growth  #Leukocytosis - 2/2 steroids vs phlebitis  #Pre-Advanced Therapies Eval    Recs:  - c/w cefazolin 1g IV q8H for RUE phlebitis  - RUE US Duplex showing superficial thrombus  - f/u blood cultures  - s/p first dose of hep B vaccine on 6/3/22  - will need Prevnar 20 vaccine      Sher Harris MD  Infectious Disease Fellow  Available on [employer] Teams  Before 9AM or after 5PM: [phone]

## 2022-06-03 NOTE — PROGRESS NOTE ADULT - SUBJECTIVE AND OBJECTIVE BOX
Subjective: Patient seen and examined resting in bed. ON no issues. Going for colonoscopy today    Medications:  allopurinol 100 milliGRAM(s) Oral daily  aMIOdarone    Tablet 200 milliGRAM(s) Oral daily  aspirin enteric coated 81 milliGRAM(s) Oral daily  atorvastatin 80 milliGRAM(s) Oral at bedtime  ceFAZolin   IVPB 1000 milliGRAM(s) IV Intermittent every 8 hours  chlorhexidine 4% Liquid 1 Application(s) Topical <User Schedule>  heparin   Injectable 5000 Unit(s) SubCutaneous every 8 hours  hydrALAZINE 25 milliGRAM(s) Oral every 8 hours  influenza   Vaccine 0.5 milliLiter(s) IntraMuscular once  insulin lispro (ADMELOG) corrective regimen sliding scale   SubCutaneous three times a day before meals  insulin lispro (ADMELOG) corrective regimen sliding scale   SubCutaneous at bedtime  lidocaine   4% Patch 1 Patch Transdermal daily  metoprolol succinate ER      metoprolol succinate ER 25 milliGRAM(s) Oral daily  milrinone Infusion 0.5 MICROgram(s)/kG/Min IV Continuous <Continuous>  pantoprazole    Tablet 40 milliGRAM(s) Oral before breakfast  polyethylene glycol 3350 17 Gram(s) Oral two times a day  senna 2 Tablet(s) Oral at bedtime  sodium chloride 0.9%. 500 milliLiter(s) IV Continuous <Continuous>    Vitals:  Vital Signs Last 24 Hours  T(C): 36.5 (22 @ 07:59), Max: 36.5 (22 @ 20:32)  HR: 92 (22 @ 09:52) (82 - 98)  BP: 107/83 (22 @ 09:52) (94/73 - 127/89)  RR: 19 (22 @ 09:52) (17 - 20)  SpO2: 95% (22 @ 09:52) (94% - 100%)    Weight in k.9 ( @ 10:00)    I&O's Summary    2022 07:01  -  2022 07:00  --------------------------------------------------------  IN: 1852 mL / OUT: 1600 mL / NET: 252 mL      Physical Exam  General: No distress. Comfortable.  Neck: Neck supple. JVP not elevated. No masses  Chest: Clear to auscultation bilaterally  CV: Normal S1 and S2. No murmurs, rub, or gallops. Radial pulses normal.  Abdomen: Soft, non-distended, non-tender  Neurology: Alert and oriented times three.     Labs:                        16.4   15.47 )-----------( 347      ( 2022 06:04 )             50.0     06-    138  |  101  |  22  ----------------------------<  78  4.4   |  25  |  1.25    Ca    9.9      2022 06:02            Serum Pro-Brain Natriuretic Peptide: 3102 pg/mL ( @ 15:23)

## 2022-06-03 NOTE — PROGRESS NOTE ADULT - NS ATTEND AMEND GEN_ALL_CORE FT
Patient known to me from [facility]. ACC/AHA Stage D HF, inotrope dependent on milrinone 0.5 mcg/kg/min with normalization of renal function, but with persistent post-capillary PH and very high PCW. Ongoing LVAD/transplant evaluation. Blood type A. Would benefit from IABP to offload the LV and reduce the PAP if he is a transplant candidate. I would not feel safe sending him home given severe LV dysfunction, recent h/o persistent VT (40 episodes) requiring ATP and a negative EPS without substrate to ablate.    Sigmoidoscopy done this morning with 5 polyps removed and sent for path. Will need the path expedited for a review by Monday am.  Will defer [place]/IABP until we have the path results.  Appreciate input from Dr. Escalante regarding the phlebitis of right forearm.     Requested input from Heme regarding his elevated H/H to make sure no underlying issue (eg, PCV) and that there is no issue for pursuing heart transplant.    Continue current medications.

## 2022-06-03 NOTE — PROGRESS NOTE ADULT - PROBLEM SELECTOR PLAN 1
- C/w milrinone to 0.5 mcg/kg/min  - CI/CO improved after AV pacing rate increased to 80  - continue with Toprol XL 25 mg PO QD   - Continue Hydralazine to 25 mg TID   - PAC/cordis discontinued 05/29 given fever   - Pending advanced therapies eval for LVAD/transplant (ABO A, PRA 0%), s/p colonoscopy on 6/2. At this time had 5 polyps removed, awaiting results of biopsy. Holding off on RHC and IABP insertion, will plan to undergo procedure on Monday 6/6

## 2022-06-04 LAB
EPO SERPL-MCNC: 23.6 MIU/ML — HIGH (ref 2.6–18.5)
GLUCOSE BLDC GLUCOMTR-MCNC: 106 MG/DL — HIGH (ref 70–99)
GLUCOSE BLDC GLUCOMTR-MCNC: 122 MG/DL — HIGH (ref 70–99)
GLUCOSE BLDC GLUCOMTR-MCNC: 90 MG/DL — SIGNIFICANT CHANGE UP (ref 70–99)
GLUCOSE BLDC GLUCOMTR-MCNC: 99 MG/DL — SIGNIFICANT CHANGE UP (ref 70–99)

## 2022-06-04 PROCEDURE — 99232 SBSQ HOSP IP/OBS MODERATE 35: CPT | Mod: GC

## 2022-06-04 PROCEDURE — 99233 SBSQ HOSP IP/OBS HIGH 50: CPT

## 2022-06-04 RX ORDER — ACETAMINOPHEN 500 MG
650 TABLET ORAL EVERY 6 HOURS
Refills: 0 | Status: DISCONTINUED | OUTPATIENT
Start: 2022-06-04 | End: 2022-06-19

## 2022-06-04 RX ADMIN — Medication 100 MILLIGRAM(S): at 13:46

## 2022-06-04 RX ADMIN — Medication 650 MILLIGRAM(S): at 23:02

## 2022-06-04 RX ADMIN — Medication 25 MILLIGRAM(S): at 08:53

## 2022-06-04 RX ADMIN — HEPARIN SODIUM 5000 UNIT(S): 5000 INJECTION INTRAVENOUS; SUBCUTANEOUS at 22:30

## 2022-06-04 RX ADMIN — Medication 100 MILLIGRAM(S): at 05:15

## 2022-06-04 RX ADMIN — CHLORHEXIDINE GLUCONATE 1 APPLICATION(S): 213 SOLUTION TOPICAL at 12:16

## 2022-06-04 RX ADMIN — PANTOPRAZOLE SODIUM 40 MILLIGRAM(S): 20 TABLET, DELAYED RELEASE ORAL at 05:14

## 2022-06-04 RX ADMIN — Medication 100 MILLIGRAM(S): at 12:17

## 2022-06-04 RX ADMIN — Medication 81 MILLIGRAM(S): at 12:17

## 2022-06-04 RX ADMIN — Medication 100 MILLIGRAM(S): at 22:25

## 2022-06-04 RX ADMIN — Medication 650 MILLIGRAM(S): at 22:25

## 2022-06-04 RX ADMIN — Medication 650 MILLIGRAM(S): at 02:37

## 2022-06-04 RX ADMIN — ATORVASTATIN CALCIUM 80 MILLIGRAM(S): 80 TABLET, FILM COATED ORAL at 22:28

## 2022-06-04 RX ADMIN — Medication 650 MILLIGRAM(S): at 01:37

## 2022-06-04 RX ADMIN — HEPARIN SODIUM 5000 UNIT(S): 5000 INJECTION INTRAVENOUS; SUBCUTANEOUS at 13:46

## 2022-06-04 RX ADMIN — Medication 25 MILLIGRAM(S): at 16:35

## 2022-06-04 RX ADMIN — AMIODARONE HYDROCHLORIDE 200 MILLIGRAM(S): 400 TABLET ORAL at 05:14

## 2022-06-04 RX ADMIN — MILRINONE LACTATE 11 MICROGRAM(S)/KG/MIN: 1 INJECTION, SOLUTION INTRAVENOUS at 07:30

## 2022-06-04 RX ADMIN — HEPARIN SODIUM 5000 UNIT(S): 5000 INJECTION INTRAVENOUS; SUBCUTANEOUS at 05:14

## 2022-06-04 RX ADMIN — Medication 25 MILLIGRAM(S): at 22:29

## 2022-06-04 RX ADMIN — SENNA PLUS 2 TABLET(S): 8.6 TABLET ORAL at 22:29

## 2022-06-04 RX ADMIN — MILRINONE LACTATE 11 MICROGRAM(S)/KG/MIN: 1 INJECTION, SOLUTION INTRAVENOUS at 20:10

## 2022-06-04 NOTE — PROGRESS NOTE ADULT - ASSESSMENT
66 year old male with history of mixed Ischemic/NICM Cardiomyopathy (EF 25-30% at baseline, s/p BIV-ICD), CAD (medically managed MIs in 2008,2011, most recent stent in April 2022 to mLAD) presented with multiple near syncope, found to have 41 episodes of VT and admitted initially to cardiac telemetry for further evaluation/management.     # CRC screening: Underwent virtual colonoscopy with inadequate visualization of left colon, however no other findings noted throughout. Performed colonoscopy (6/3):  FIVE polyps found ranging from 3 mm to 8 mm removed via biopsy forceps or cold snare polypectomy. Clips placed on polyps removed by clips. Repeat interval for surveillance in 3 years, path pending    # Mixed ischemic/nonischemic cardiomyopathy      Recommendations:  - Await pathology results, repeat colonoscopy in 3 years (2025)  -trend vitals, CBC, and monitor for clinical signs of bleeding  -maintain active type and screen  -transfusion goal to maintain hemoglobin >/= 7.0 and platelets >/= 50  - No GI contraindications to proceed with OHT evaluation    Recommendations incomplete until finalized by attending signature/attestation to note.    Thank you for involving us in the care of this patient, please reach out if any further questions.     Frank Lombardi MD  Gastroenterology/Hepatology Fellow, PGY5    Available on [employer] Teams  [phone] ([facility])  [id number] ([facility])  Please contact on call fellow weekdays after 5pm-7am and weekends: [phone]

## 2022-06-04 NOTE — PROGRESS NOTE ADULT - PROBLEM SELECTOR PLAN 1
remains inotrope dependent on milrinone  metop 25, cw hydral 25 tid, GDMT on hold pending improvement of hemodynamics  continue amio  undergoing eval for OHT and LVAD  s/p colonoscopy w 5 polyps. Path pending  Possible RHC w/ IABP planned for Monday 6/6

## 2022-06-04 NOTE — PROGRESS NOTE ADULT - SUBJECTIVE AND OBJECTIVE BOX
Interval Events:   No acute events overnight.  Patient without acute symptoms at this time.    ROS:   12 point review of systems performed and negative except otherwise noted in HPI.    Hospital Medications:  allopurinol 100 milliGRAM(s) Oral daily  aMIOdarone    Tablet 200 milliGRAM(s) Oral daily  aspirin enteric coated 81 milliGRAM(s) Oral daily  atorvastatin 80 milliGRAM(s) Oral at bedtime  chlorhexidine 4% Liquid 1 Application(s) Topical <User Schedule>  heparin   Injectable 5000 Unit(s) SubCutaneous every 8 hours  hydrALAZINE 25 milliGRAM(s) Oral every 8 hours  influenza   Vaccine 0.5 milliLiter(s) IntraMuscular once  insulin lispro (ADMELOG) corrective regimen sliding scale   SubCutaneous three times a day before meals  insulin lispro (ADMELOG) corrective regimen sliding scale   SubCutaneous at bedtime  lidocaine   4% Patch 1 Patch Transdermal daily  metoprolol succinate ER      metoprolol succinate ER 25 milliGRAM(s) Oral daily  milrinone Infusion 0.5 MICROgram(s)/kG/Min IV Continuous <Continuous>  pantoprazole    Tablet 40 milliGRAM(s) Oral before breakfast  polyethylene glycol 3350 17 Gram(s) Oral two times a day  senna 2 Tablet(s) Oral at bedtime      PHYSICAL EXAM:   Vital Signs Last 24 Hrs  T(C): 36.4 (04 Jun 2022 12:04), Max: 36.6 (03 Jun 2022 20:43)  T(F): 97.5 (04 Jun 2022 12:04), Max: 97.8 (03 Jun 2022 20:43)  HR: 78 (04 Jun 2022 12:04) (78 - 86)  BP: 109/75 (04 Jun 2022 12:04) (96/67 - 122/85)  BP(mean): --  RR: 18 (04 Jun 2022 12:04) (18 - 18)  SpO2: 98% (04 Jun 2022 12:04) (94% - 98%)    GENERAL: no acute distress  NEURO: alert  HEENT: NCAT  CHEST: no respiratory distress, no accessory muscle use  CARDIAC: regular rate, +S1/S2  ABDOMEN: soft, nondistended, nontender, no rebound or guarding  EXTREMITIES: warm, well perfused  SKIN: no lesions noted    LABS:                        16.4   15.47 )-----------( 347      ( 03 Jun 2022 06:04 )             50.0     06-03    138  |  101  |  22  ----------------------------<  78  4.4   |  25  |  1.25    Ca    9.9      03 Jun 2022 06:02

## 2022-06-04 NOTE — PROGRESS NOTE ADULT - ASSESSMENT
67 y/o male w/ PMHx HTN, HLD, type 2 DM, HFrEF (EF 15%), complete heart block s/p PPM (in 2006, upgraded to BiV-ICD in 09/2016), CAD (s/p recent BERNABE mid LAD at [facility] on 04/18/2022), and stage II CKD (baseline Cr ~1.3) presented with near syncope to OSH found to have 41 episodes of VT on device, s/p unsuccessful VT ablation and transferred to [facility] for management of cardiogenic shock and advanced therapy eval.

## 2022-06-05 LAB
ANABASINE UR-MCNC: <2 NG/ML — SIGNIFICANT CHANGE UP
ANION GAP SERPL CALC-SCNC: 12 MMOL/L — SIGNIFICANT CHANGE UP (ref 5–17)
BUN SERPL-MCNC: 25 MG/DL — HIGH (ref 7–23)
CALCIUM SERPL-MCNC: 9.1 MG/DL — SIGNIFICANT CHANGE UP (ref 8.4–10.5)
CHLORIDE SERPL-SCNC: 103 MMOL/L — SIGNIFICANT CHANGE UP (ref 96–108)
CO2 SERPL-SCNC: 22 MMOL/L — SIGNIFICANT CHANGE UP (ref 22–31)
COTININE UR-MCNC: <5 NG/ML — SIGNIFICANT CHANGE UP
CREAT SERPL-MCNC: 1.57 MG/DL — HIGH (ref 0.5–1.3)
EGFR: 49 ML/MIN/1.73M2 — LOW
GLUCOSE BLDC GLUCOMTR-MCNC: 103 MG/DL — HIGH (ref 70–99)
GLUCOSE BLDC GLUCOMTR-MCNC: 113 MG/DL — HIGH (ref 70–99)
GLUCOSE BLDC GLUCOMTR-MCNC: 129 MG/DL — HIGH (ref 70–99)
GLUCOSE BLDC GLUCOMTR-MCNC: 91 MG/DL — SIGNIFICANT CHANGE UP (ref 70–99)
GLUCOSE SERPL-MCNC: 118 MG/DL — HIGH (ref 70–99)
HCT VFR BLD CALC: 44.8 % — SIGNIFICANT CHANGE UP (ref 39–50)
HGB BLD-MCNC: 14.5 G/DL — SIGNIFICANT CHANGE UP (ref 13–17)
INR BLD: 1.08 RATIO — SIGNIFICANT CHANGE UP (ref 0.88–1.16)
MCHC RBC-ENTMCNC: 30 PG — SIGNIFICANT CHANGE UP (ref 27–34)
MCHC RBC-ENTMCNC: 32.4 GM/DL — SIGNIFICANT CHANGE UP (ref 32–36)
MCV RBC AUTO: 92.6 FL — SIGNIFICANT CHANGE UP (ref 80–100)
NICOTINE UR-MCNC: <5 NG/ML — SIGNIFICANT CHANGE UP
NORNICOTINE UR-MCNC: <2 NG/ML — SIGNIFICANT CHANGE UP
NRBC # BLD: 0 /100 WBCS — SIGNIFICANT CHANGE UP (ref 0–0)
PLATELET # BLD AUTO: 320 K/UL — SIGNIFICANT CHANGE UP (ref 150–400)
POTASSIUM SERPL-MCNC: 5 MMOL/L — SIGNIFICANT CHANGE UP (ref 3.5–5.3)
POTASSIUM SERPL-SCNC: 5 MMOL/L — SIGNIFICANT CHANGE UP (ref 3.5–5.3)
PROTHROM AB SERPL-ACNC: 12.5 SEC — SIGNIFICANT CHANGE UP (ref 10.5–13.4)
RBC # BLD: 4.84 M/UL — SIGNIFICANT CHANGE UP (ref 4.2–5.8)
RBC # FLD: 16 % — HIGH (ref 10.3–14.5)
SARS-COV-2 RNA SPEC QL NAA+PROBE: SIGNIFICANT CHANGE UP
SODIUM SERPL-SCNC: 137 MMOL/L — SIGNIFICANT CHANGE UP (ref 135–145)
WBC # BLD: 13.27 K/UL — HIGH (ref 3.8–10.5)
WBC # FLD AUTO: 13.27 K/UL — HIGH (ref 3.8–10.5)

## 2022-06-05 PROCEDURE — 99233 SBSQ HOSP IP/OBS HIGH 50: CPT

## 2022-06-05 RX ORDER — METOPROLOL TARTRATE 50 MG
25 TABLET ORAL DAILY
Refills: 0 | Status: DISCONTINUED | OUTPATIENT
Start: 2022-06-05 | End: 2022-06-21

## 2022-06-05 RX ORDER — INSULIN LISPRO 100/ML
VIAL (ML) SUBCUTANEOUS EVERY 6 HOURS
Refills: 0 | Status: DISCONTINUED | OUTPATIENT
Start: 2022-06-05 | End: 2022-06-05

## 2022-06-05 RX ADMIN — Medication 25 MILLIGRAM(S): at 21:05

## 2022-06-05 RX ADMIN — Medication 25 MILLIGRAM(S): at 06:18

## 2022-06-05 RX ADMIN — Medication 100 MILLIGRAM(S): at 21:06

## 2022-06-05 RX ADMIN — HEPARIN SODIUM 5000 UNIT(S): 5000 INJECTION INTRAVENOUS; SUBCUTANEOUS at 21:06

## 2022-06-05 RX ADMIN — CHLORHEXIDINE GLUCONATE 1 APPLICATION(S): 213 SOLUTION TOPICAL at 13:29

## 2022-06-05 RX ADMIN — MILRINONE LACTATE 11 MICROGRAM(S)/KG/MIN: 1 INJECTION, SOLUTION INTRAVENOUS at 22:26

## 2022-06-05 RX ADMIN — Medication 81 MILLIGRAM(S): at 13:30

## 2022-06-05 RX ADMIN — HEPARIN SODIUM 5000 UNIT(S): 5000 INJECTION INTRAVENOUS; SUBCUTANEOUS at 06:19

## 2022-06-05 RX ADMIN — Medication 25 MILLIGRAM(S): at 13:30

## 2022-06-05 RX ADMIN — Medication 650 MILLIGRAM(S): at 07:00

## 2022-06-05 RX ADMIN — HEPARIN SODIUM 5000 UNIT(S): 5000 INJECTION INTRAVENOUS; SUBCUTANEOUS at 13:30

## 2022-06-05 RX ADMIN — ATORVASTATIN CALCIUM 80 MILLIGRAM(S): 80 TABLET, FILM COATED ORAL at 21:05

## 2022-06-05 RX ADMIN — Medication 650 MILLIGRAM(S): at 06:19

## 2022-06-05 RX ADMIN — MILRINONE LACTATE 11 MICROGRAM(S)/KG/MIN: 1 INJECTION, SOLUTION INTRAVENOUS at 13:29

## 2022-06-05 RX ADMIN — PANTOPRAZOLE SODIUM 40 MILLIGRAM(S): 20 TABLET, DELAYED RELEASE ORAL at 06:18

## 2022-06-05 RX ADMIN — Medication 100 MILLIGRAM(S): at 06:19

## 2022-06-05 RX ADMIN — AMIODARONE HYDROCHLORIDE 200 MILLIGRAM(S): 400 TABLET ORAL at 06:18

## 2022-06-05 RX ADMIN — Medication 100 MILLIGRAM(S): at 13:56

## 2022-06-05 RX ADMIN — Medication 100 MILLIGRAM(S): at 13:30

## 2022-06-05 NOTE — PROGRESS NOTE ADULT - PROBLEM SELECTOR PLAN 1
No acute SOB, no chest pain. Afebrile.  - undergoing eval for OHT and LVAD, Possible RHC w/ IABP planned for Monday 6/6  - Cardiology f/u plan noted- Pending advanced therapies eval for LVAD/transplant (ABO A, PRA 0%), s/p colonoscopy on 6/2. At this time had 5 polyps removed, awaiting results of biopsy. Holding off on RHC and IABP insertion, will plan to undergo procedure on Monday 6/6.  - remains inotrope dependent on milrinone @ 0.5 mcg/kg/min  - c/w metoprolol ER 25mg/d, hydral 25mg tid  - continue amiodarone 200mg/d

## 2022-06-05 NOTE — PROGRESS NOTE ADULT - PROBLEM SELECTOR PLAN 2
No chest pain, EKG no acute ischemic changes  - cw ASA but Plavix on hold for now  - c/w metoprolol and statin

## 2022-06-05 NOTE — PROGRESS NOTE ADULT - ASSESSMENT
65 y/o male w/ PMHx HTN, HLD, type 2 DM, HFrEF (EF 15%), complete heart block s/p PPM (in 2006, upgraded to BiV-ICD in 09/2016), CAD (s/p recent BERNABE mid LAD at [facility] on 04/18/2022), and stage II CKD (baseline Cr ~1.3) presented with near syncope to OSH found to have 41 episodes of VT on device, s/p unsuccessful VT ablation and transferred to [facility] for management of cardiogenic shock and advanced therapy eval.

## 2022-06-05 NOTE — PROGRESS NOTE ADULT - SUBJECTIVE AND OBJECTIVE BOX
Mohsin Khan, MD  Attending Physician, Division Of Hospital Medicine  Pager: [phone], Office: [phone]  Off hour pager: [phone]    Patient is a 66y old  Male who presents with a chief complaint of LVAD/OHT eval     SUBJECTIVE / OVERNIGHT EVENTS:  Seen, examined the patient this am, resting in bed.  Feels ok, no chest pain, SOB, no overnight event, VSS    MEDICATIONS  (STANDING):  allopurinol 100 milliGRAM(s) Oral daily  aMIOdarone    Tablet 200 milliGRAM(s) Oral daily  aspirin enteric coated 81 milliGRAM(s) Oral daily  atorvastatin 80 milliGRAM(s) Oral at bedtime  ceFAZolin   IVPB 1000 milliGRAM(s) IV Intermittent every 8 hours  chlorhexidine 4% Liquid 1 Application(s) Topical <User Schedule>  heparin   Injectable 5000 Unit(s) SubCutaneous every 8 hours  hydrALAZINE 25 milliGRAM(s) Oral every 8 hours  influenza   Vaccine 0.5 milliLiter(s) IntraMuscular once  insulin lispro (ADMELOG) corrective regimen sliding scale   SubCutaneous at bedtime  insulin lispro (ADMELOG) corrective regimen sliding scale   SubCutaneous three times a day before meals  lidocaine   4% Patch 1 Patch Transdermal daily  metoprolol succinate ER      metoprolol succinate ER 25 milliGRAM(s) Oral daily  milrinone Infusion 0.5 MICROgram(s)/kG/Min (11 mL/Hr) IV Continuous <Continuous>  pantoprazole    Tablet 40 milliGRAM(s) Oral before breakfast  polyethylene glycol 3350 17 Gram(s) Oral two times a day  senna 2 Tablet(s) Oral at bedtime  sodium chloride 0.9%. 500 milliLiter(s) (30 mL/Hr) IV Continuous <Continuous>    MEDICATIONS  (PRN):  acetaminophen     Tablet .. 650 milliGRAM(s) Oral every 6 hours PRN Temp greater or equal to 38C (100.4F), Mild Pain (1 - 3)      Vital Signs Last 24 Hrs  T(C): 36.2 (04 Jun 2022 20:27), Max: 36.4 (04 Jun 2022 12:04)  T(F): 97.2 (04 Jun 2022 20:27), Max: 97.5 (04 Jun 2022 12:04)  HR: 78 (05 Jun 2022 04:44) (78 - 78)  BP: 101/68 (05 Jun 2022 04:44) (101/68 - 110/72)  BP(mean): --  RR: 18 (05 Jun 2022 04:44) (18 - 18)  SpO2: 96% (05 Jun 2022 04:44) (95% - 98%)  CAPILLARY BLOOD GLUCOSE      POCT Blood Glucose.: 103 mg/dL (05 Jun 2022 08:06)  POCT Blood Glucose.: 122 mg/dL (04 Jun 2022 21:38)  POCT Blood Glucose.: 106 mg/dL (04 Jun 2022 16:54)    I&O's Summary    04 Jun 2022 07:01  -  05 Jun 2022 07:00  --------------------------------------------------------  IN: 840 mL / OUT: 2050 mL / NET: -1210 mL        PHYSICAL EXAM:-  GENERAL: NAD, well-developed  EYES: EOMI, PERRLA, conjunctiva and sclera clear  NECK: Supple, No JVD, no thyromegaly  CHEST/LUNG: Clear to auscultation bilaterally; No wheeze  HEART: Regular rate and rhythm; S1, S2 audible, No murmurs, rubs, or gallops  ABDOMEN: Soft, Nontender, Nondistended; Bowel sounds present  EXTREMITIES:  2+ Peripheral Pulses, No clubbing, cyanosis, or edema  NEURO: AAOx3, no focal deficit      LABS:          RADIOLOGY & ADDITIONAL TESTS:    Imaging Personally Reviewed: CXR, echo  Consultant(s) Notes Reviewed:  Card

## 2022-06-06 LAB
ALBUMIN SERPL ELPH-MCNC: 3 G/DL — LOW (ref 3.3–5)
ALP SERPL-CCNC: 53 U/L — SIGNIFICANT CHANGE UP (ref 40–120)
ALT FLD-CCNC: 22 U/L — SIGNIFICANT CHANGE UP (ref 10–45)
ANION GAP SERPL CALC-SCNC: 11 MMOL/L — SIGNIFICANT CHANGE UP (ref 5–17)
ANION GAP SERPL CALC-SCNC: 12 MMOL/L — SIGNIFICANT CHANGE UP (ref 5–17)
APTT BLD: 29.6 SEC — SIGNIFICANT CHANGE UP (ref 27.5–35.5)
APTT BLD: 67.6 SEC — HIGH (ref 27.5–35.5)
AST SERPL-CCNC: 29 U/L — SIGNIFICANT CHANGE UP (ref 10–40)
BASE EXCESS BLDMV CALC-SCNC: -1.8 MMOL/L — SIGNIFICANT CHANGE UP (ref -3–3)
BASOPHILS # BLD AUTO: 0.04 K/UL — SIGNIFICANT CHANGE UP (ref 0–0.2)
BASOPHILS # BLD AUTO: 0.04 K/UL — SIGNIFICANT CHANGE UP (ref 0–0.2)
BASOPHILS # BLD AUTO: 0.05 K/UL — SIGNIFICANT CHANGE UP (ref 0–0.2)
BASOPHILS NFR BLD AUTO: 0.3 % — SIGNIFICANT CHANGE UP (ref 0–2)
BASOPHILS NFR BLD AUTO: 0.4 % — SIGNIFICANT CHANGE UP (ref 0–2)
BASOPHILS NFR BLD AUTO: 0.5 % — SIGNIFICANT CHANGE UP (ref 0–2)
BILIRUB SERPL-MCNC: 0.7 MG/DL — SIGNIFICANT CHANGE UP (ref 0.2–1.2)
BUN SERPL-MCNC: 24 MG/DL — HIGH (ref 7–23)
BUN SERPL-MCNC: 27 MG/DL — HIGH (ref 7–23)
CALCIUM SERPL-MCNC: 8.8 MG/DL — SIGNIFICANT CHANGE UP (ref 8.4–10.5)
CALCIUM SERPL-MCNC: 9.1 MG/DL — SIGNIFICANT CHANGE UP (ref 8.4–10.5)
CHLORIDE SERPL-SCNC: 103 MMOL/L — SIGNIFICANT CHANGE UP (ref 96–108)
CHLORIDE SERPL-SCNC: 104 MMOL/L — SIGNIFICANT CHANGE UP (ref 96–108)
CO2 BLDMV-SCNC: 24 MMOL/L — SIGNIFICANT CHANGE UP (ref 21–29)
CO2 SERPL-SCNC: 19 MMOL/L — LOW (ref 22–31)
CO2 SERPL-SCNC: 20 MMOL/L — LOW (ref 22–31)
CREAT SERPL-MCNC: 1.35 MG/DL — HIGH (ref 0.5–1.3)
CREAT SERPL-MCNC: 1.64 MG/DL — HIGH (ref 0.5–1.3)
CREATININE, URINE RESULT: 32 MG/DL — SIGNIFICANT CHANGE UP
EGFR: 46 ML/MIN/1.73M2 — LOW
EGFR: 59 ML/MIN/1.73M2 — LOW
EOSINOPHIL # BLD AUTO: 0.01 K/UL — SIGNIFICANT CHANGE UP (ref 0–0.5)
EOSINOPHIL # BLD AUTO: 0.36 K/UL — SIGNIFICANT CHANGE UP (ref 0–0.5)
EOSINOPHIL # BLD AUTO: 0.41 K/UL — SIGNIFICANT CHANGE UP (ref 0–0.5)
EOSINOPHIL NFR BLD AUTO: 0.1 % — SIGNIFICANT CHANGE UP (ref 0–6)
EOSINOPHIL NFR BLD AUTO: 3.4 % — SIGNIFICANT CHANGE UP (ref 0–6)
EOSINOPHIL NFR BLD AUTO: 3.4 % — SIGNIFICANT CHANGE UP (ref 0–6)
GAS PNL BLDMV: SIGNIFICANT CHANGE UP
GLUCOSE BLDC GLUCOMTR-MCNC: 107 MG/DL — HIGH (ref 70–99)
GLUCOSE BLDC GLUCOMTR-MCNC: 145 MG/DL — HIGH (ref 70–99)
GLUCOSE SERPL-MCNC: 108 MG/DL — HIGH (ref 70–99)
GLUCOSE SERPL-MCNC: 111 MG/DL — HIGH (ref 70–99)
HCO3 BLDMV-SCNC: 23 MMOL/L — SIGNIFICANT CHANGE UP (ref 20–28)
HCT VFR BLD CALC: 34.6 % — LOW (ref 39–50)
HCT VFR BLD CALC: 38.2 % — LOW (ref 39–50)
HCT VFR BLD CALC: 41 % — SIGNIFICANT CHANGE UP (ref 39–50)
HCT VFR BLD CALC: 41.9 % — SIGNIFICANT CHANGE UP (ref 39–50)
HGB BLD-MCNC: 11.6 G/DL — LOW (ref 13–17)
HGB BLD-MCNC: 12.7 G/DL — LOW (ref 13–17)
HGB BLD-MCNC: 13.5 G/DL — SIGNIFICANT CHANGE UP (ref 13–17)
HGB BLD-MCNC: 13.5 G/DL — SIGNIFICANT CHANGE UP (ref 13–17)
HOROWITZ INDEX BLDMV+IHG-RTO: 21 — SIGNIFICANT CHANGE UP
IMM GRANULOCYTES NFR BLD AUTO: 0.6 % — SIGNIFICANT CHANGE UP (ref 0–1.5)
IMM GRANULOCYTES NFR BLD AUTO: 0.7 % — SIGNIFICANT CHANGE UP (ref 0–1.5)
IMM GRANULOCYTES NFR BLD AUTO: 0.8 % — SIGNIFICANT CHANGE UP (ref 0–1.5)
INR BLD: 1.12 RATIO — SIGNIFICANT CHANGE UP (ref 0.88–1.16)
INR BLD: 1.12 RATIO — SIGNIFICANT CHANGE UP (ref 0.88–1.16)
LACTATE SERPL-SCNC: 1 MMOL/L — SIGNIFICANT CHANGE UP (ref 0.7–2)
LYMPHOCYTES # BLD AUTO: 1.48 K/UL — SIGNIFICANT CHANGE UP (ref 1–3.3)
LYMPHOCYTES # BLD AUTO: 1.92 K/UL — SIGNIFICANT CHANGE UP (ref 1–3.3)
LYMPHOCYTES # BLD AUTO: 13.9 % — SIGNIFICANT CHANGE UP (ref 13–44)
LYMPHOCYTES # BLD AUTO: 16.7 % — SIGNIFICANT CHANGE UP (ref 13–44)
LYMPHOCYTES # BLD AUTO: 18 % — SIGNIFICANT CHANGE UP (ref 13–44)
LYMPHOCYTES # BLD AUTO: 2.01 K/UL — SIGNIFICANT CHANGE UP (ref 1–3.3)
MAGNESIUM SERPL-MCNC: 1.9 MG/DL — SIGNIFICANT CHANGE UP (ref 1.6–2.6)
MCHC RBC-ENTMCNC: 28.9 PG — SIGNIFICANT CHANGE UP (ref 27–34)
MCHC RBC-ENTMCNC: 30 PG — SIGNIFICANT CHANGE UP (ref 27–34)
MCHC RBC-ENTMCNC: 30.2 PG — SIGNIFICANT CHANGE UP (ref 27–34)
MCHC RBC-ENTMCNC: 30.5 PG — SIGNIFICANT CHANGE UP (ref 27–34)
MCHC RBC-ENTMCNC: 32.2 GM/DL — SIGNIFICANT CHANGE UP (ref 32–36)
MCHC RBC-ENTMCNC: 32.9 GM/DL — SIGNIFICANT CHANGE UP (ref 32–36)
MCHC RBC-ENTMCNC: 33.2 GM/DL — SIGNIFICANT CHANGE UP (ref 32–36)
MCHC RBC-ENTMCNC: 33.5 GM/DL — SIGNIFICANT CHANGE UP (ref 32–36)
MCV RBC AUTO: 86.1 FL — SIGNIFICANT CHANGE UP (ref 80–100)
MCV RBC AUTO: 91.1 FL — SIGNIFICANT CHANGE UP (ref 80–100)
MCV RBC AUTO: 91.6 FL — SIGNIFICANT CHANGE UP (ref 80–100)
MCV RBC AUTO: 93.7 FL — SIGNIFICANT CHANGE UP (ref 80–100)
MONOCYTES # BLD AUTO: 1.04 K/UL — HIGH (ref 0–0.9)
MONOCYTES # BLD AUTO: 1.18 K/UL — HIGH (ref 0–0.9)
MONOCYTES # BLD AUTO: 1.29 K/UL — HIGH (ref 0–0.9)
MONOCYTES NFR BLD AUTO: 12.1 % — SIGNIFICANT CHANGE UP (ref 2–14)
MONOCYTES NFR BLD AUTO: 9.7 % — SIGNIFICANT CHANGE UP (ref 2–14)
MONOCYTES NFR BLD AUTO: 9.8 % — SIGNIFICANT CHANGE UP (ref 2–14)
NEUTROPHILS # BLD AUTO: 7 K/UL — SIGNIFICANT CHANGE UP (ref 1.8–7.4)
NEUTROPHILS # BLD AUTO: 8.01 K/UL — HIGH (ref 1.8–7.4)
NEUTROPHILS # BLD AUTO: 8.32 K/UL — HIGH (ref 1.8–7.4)
NEUTROPHILS NFR BLD AUTO: 65.4 % — SIGNIFICANT CHANGE UP (ref 43–77)
NEUTROPHILS NFR BLD AUTO: 69.1 % — SIGNIFICANT CHANGE UP (ref 43–77)
NEUTROPHILS NFR BLD AUTO: 75.1 % — SIGNIFICANT CHANGE UP (ref 43–77)
NRBC # BLD: 0 /100 WBCS — SIGNIFICANT CHANGE UP (ref 0–0)
O2 CT VFR BLD CALC: 36 MMHG — SIGNIFICANT CHANGE UP (ref 30–65)
PCO2 BLDMV: 39 MMHG — SIGNIFICANT CHANGE UP (ref 30–65)
PH BLDMV: 7.38 — SIGNIFICANT CHANGE UP (ref 7.32–7.45)
PHOSPHATE SERPL-MCNC: 2.8 MG/DL — SIGNIFICANT CHANGE UP (ref 2.5–4.5)
PLATELET # BLD AUTO: 253 K/UL — SIGNIFICANT CHANGE UP (ref 150–400)
PLATELET # BLD AUTO: 289 K/UL — SIGNIFICANT CHANGE UP (ref 150–400)
PLATELET # BLD AUTO: 292 K/UL — SIGNIFICANT CHANGE UP (ref 150–400)
PLATELET # BLD AUTO: 336 K/UL — SIGNIFICANT CHANGE UP (ref 150–400)
POTASSIUM SERPL-MCNC: 4.2 MMOL/L — SIGNIFICANT CHANGE UP (ref 3.5–5.3)
POTASSIUM SERPL-MCNC: 4.5 MMOL/L — SIGNIFICANT CHANGE UP (ref 3.5–5.3)
POTASSIUM SERPL-SCNC: 4.2 MMOL/L — SIGNIFICANT CHANGE UP (ref 3.5–5.3)
POTASSIUM SERPL-SCNC: 4.5 MMOL/L — SIGNIFICANT CHANGE UP (ref 3.5–5.3)
PROT ?TM UR-MCNC: 11 MG/DL — SIGNIFICANT CHANGE UP (ref 0–12)
PROT SERPL-MCNC: 6.1 G/DL — SIGNIFICANT CHANGE UP (ref 6–8.3)
PROTHROM AB SERPL-ACNC: 12.9 SEC — SIGNIFICANT CHANGE UP (ref 10.5–13.4)
PROTHROM AB SERPL-ACNC: 12.9 SEC — SIGNIFICANT CHANGE UP (ref 10.5–13.4)
RBC # BLD: 4.02 M/UL — LOW (ref 4.2–5.8)
RBC # BLD: 4.17 M/UL — LOW (ref 4.2–5.8)
RBC # BLD: 4.47 M/UL — SIGNIFICANT CHANGE UP (ref 4.2–5.8)
RBC # BLD: 4.5 M/UL — SIGNIFICANT CHANGE UP (ref 4.2–5.8)
RBC # FLD: 13.6 % — SIGNIFICANT CHANGE UP (ref 10.3–14.5)
RBC # FLD: 16 % — HIGH (ref 10.3–14.5)
RBC # FLD: 16.1 % — HIGH (ref 10.3–14.5)
RBC # FLD: 16.2 % — HIGH (ref 10.3–14.5)
SAO2 % BLDMV: 63.6 — SIGNIFICANT CHANGE UP (ref 60–90)
SODIUM SERPL-SCNC: 134 MMOL/L — LOW (ref 135–145)
SODIUM SERPL-SCNC: 135 MMOL/L — SIGNIFICANT CHANGE UP (ref 135–145)
SURGICAL PATHOLOGY STUDY: SIGNIFICANT CHANGE UP
WBC # BLD: 10.67 K/UL — HIGH (ref 3.8–10.5)
WBC # BLD: 10.68 K/UL — HIGH (ref 3.8–10.5)
WBC # BLD: 12.01 K/UL — HIGH (ref 3.8–10.5)
WBC # BLD: 12.04 K/UL — HIGH (ref 3.8–10.5)
WBC # FLD AUTO: 10.67 K/UL — HIGH (ref 3.8–10.5)
WBC # FLD AUTO: 10.68 K/UL — HIGH (ref 3.8–10.5)
WBC # FLD AUTO: 12.01 K/UL — HIGH (ref 3.8–10.5)
WBC # FLD AUTO: 12.04 K/UL — HIGH (ref 3.8–10.5)

## 2022-06-06 PROCEDURE — 99152 MOD SED SAME PHYS/QHP 5/>YRS: CPT

## 2022-06-06 PROCEDURE — 93010 ELECTROCARDIOGRAM REPORT: CPT

## 2022-06-06 PROCEDURE — 99291 CRITICAL CARE FIRST HOUR: CPT | Mod: 25

## 2022-06-06 PROCEDURE — 99232 SBSQ HOSP IP/OBS MODERATE 35: CPT

## 2022-06-06 PROCEDURE — 71045 X-RAY EXAM CHEST 1 VIEW: CPT | Mod: 26

## 2022-06-06 PROCEDURE — 93451 RIGHT HEART CATH: CPT | Mod: 26

## 2022-06-06 PROCEDURE — 99233 SBSQ HOSP IP/OBS HIGH 50: CPT

## 2022-06-06 PROCEDURE — 99292 CRITICAL CARE ADDL 30 MIN: CPT | Mod: 25

## 2022-06-06 PROCEDURE — 33967 INSERT I-AORT PERCUT DEVICE: CPT

## 2022-06-06 PROCEDURE — 99232 SBSQ HOSP IP/OBS MODERATE 35: CPT | Mod: GC

## 2022-06-06 PROCEDURE — 99292 CRITICAL CARE ADDL 30 MIN: CPT

## 2022-06-06 PROCEDURE — 99233 SBSQ HOSP IP/OBS HIGH 50: CPT | Mod: GC

## 2022-06-06 RX ORDER — HEPARIN SODIUM 5000 [USP'U]/ML
1200 INJECTION INTRAVENOUS; SUBCUTANEOUS
Qty: 25000 | Refills: 0 | Status: DISCONTINUED | OUTPATIENT
Start: 2022-06-06 | End: 2022-06-21

## 2022-06-06 RX ORDER — ISOSORBIDE DINITRATE 5 MG/1
10 TABLET ORAL THREE TIMES A DAY
Refills: 0 | Status: DISCONTINUED | OUTPATIENT
Start: 2022-06-06 | End: 2022-06-07

## 2022-06-06 RX ORDER — HYDRALAZINE HCL 50 MG
37.5 TABLET ORAL EVERY 8 HOURS
Refills: 0 | Status: DISCONTINUED | OUTPATIENT
Start: 2022-06-06 | End: 2022-06-07

## 2022-06-06 RX ORDER — CHLORHEXIDINE GLUCONATE 213 G/1000ML
1 SOLUTION TOPICAL DAILY
Refills: 0 | Status: DISCONTINUED | OUTPATIENT
Start: 2022-06-06 | End: 2022-06-21

## 2022-06-06 RX ORDER — MAGNESIUM SULFATE 500 MG/ML
2 VIAL (ML) INJECTION ONCE
Refills: 0 | Status: COMPLETED | OUTPATIENT
Start: 2022-06-06 | End: 2022-06-06

## 2022-06-06 RX ADMIN — MILRINONE LACTATE 11 MICROGRAM(S)/KG/MIN: 1 INJECTION, SOLUTION INTRAVENOUS at 08:10

## 2022-06-06 RX ADMIN — Medication 25 MILLIGRAM(S): at 05:14

## 2022-06-06 RX ADMIN — Medication 100 MILLIGRAM(S): at 15:56

## 2022-06-06 RX ADMIN — Medication 37.5 MILLIGRAM(S): at 22:16

## 2022-06-06 RX ADMIN — AMIODARONE HYDROCHLORIDE 200 MILLIGRAM(S): 400 TABLET ORAL at 05:13

## 2022-06-06 RX ADMIN — Medication 25 MILLIGRAM(S): at 05:13

## 2022-06-06 RX ADMIN — PANTOPRAZOLE SODIUM 40 MILLIGRAM(S): 20 TABLET, DELAYED RELEASE ORAL at 05:13

## 2022-06-06 RX ADMIN — Medication 100 MILLIGRAM(S): at 11:00

## 2022-06-06 RX ADMIN — ATORVASTATIN CALCIUM 80 MILLIGRAM(S): 80 TABLET, FILM COATED ORAL at 22:15

## 2022-06-06 RX ADMIN — Medication 650 MILLIGRAM(S): at 23:00

## 2022-06-06 RX ADMIN — Medication 25 MILLIGRAM(S): at 15:45

## 2022-06-06 RX ADMIN — HEPARIN SODIUM 12 UNIT(S)/HR: 5000 INJECTION INTRAVENOUS; SUBCUTANEOUS at 15:40

## 2022-06-06 RX ADMIN — CHLORHEXIDINE GLUCONATE 1 APPLICATION(S): 213 SOLUTION TOPICAL at 08:14

## 2022-06-06 RX ADMIN — MILRINONE LACTATE 11 MICROGRAM(S)/KG/MIN: 1 INJECTION, SOLUTION INTRAVENOUS at 15:41

## 2022-06-06 RX ADMIN — Medication 650 MILLIGRAM(S): at 22:15

## 2022-06-06 RX ADMIN — SENNA PLUS 2 TABLET(S): 8.6 TABLET ORAL at 22:16

## 2022-06-06 RX ADMIN — MILRINONE LACTATE 11 MICROGRAM(S)/KG/MIN: 1 INJECTION, SOLUTION INTRAVENOUS at 11:01

## 2022-06-06 RX ADMIN — MILRINONE LACTATE 11 MICROGRAM(S)/KG/MIN: 1 INJECTION, SOLUTION INTRAVENOUS at 22:16

## 2022-06-06 RX ADMIN — Medication 25 GRAM(S): at 17:37

## 2022-06-06 RX ADMIN — HEPARIN SODIUM 5000 UNIT(S): 5000 INJECTION INTRAVENOUS; SUBCUTANEOUS at 05:56

## 2022-06-06 RX ADMIN — Medication 81 MILLIGRAM(S): at 11:00

## 2022-06-06 RX ADMIN — ISOSORBIDE DINITRATE 10 MILLIGRAM(S): 5 TABLET ORAL at 17:26

## 2022-06-06 RX ADMIN — POLYETHYLENE GLYCOL 3350 17 GRAM(S): 17 POWDER, FOR SOLUTION ORAL at 20:08

## 2022-06-06 RX ADMIN — Medication 100 MILLIGRAM(S): at 05:56

## 2022-06-06 NOTE — PROGRESS NOTE ADULT - ASSESSMENT
65 y/o m PMHx HTN, HLD, type 2 DM, HFrEF (EF 15%), complete heart block s/p PPM (in 2006, upgraded to BiV-ICD in 09/2016), CAD (s/p recent BERNABE mid LAD at [facility] on 04/18/2022), and stage II CKD (baseline Cr ~1.3) presented with near syncope to OSH found to have 41 episodes of VT on device, s/p unsuccessful VT ablation and transferred to [facility] for management of cardiogenic shock and advanced therapy eval, now for RHC/IABP and tx to CICU.

## 2022-06-06 NOTE — PROGRESS NOTE ADULT - ASSESSMENT
66 year old male with history of mixed Ischemic/NICM Cardiomyopathy (EF 25-30% at baseline, s/p BIV-ICD), CAD (medically managed MIs in 2008,2011, most recent stent in April 2022 to mLAD) presented with multiple near syncope, found to have 41 episodes of VT and admitted initially to cardiac telemetry for further evaluation/management.     # CRC screening: Underwent virtual colonoscopy with inadequate visualization of left colon, however no other findings noted throughout. Performed colonoscopy (6/3):  FIVE polyps found ranging from 3 mm to 8 mm removed via biopsy forceps or cold snare polypectomy. Clips placed on polyps removed by clips. Repeat interval for surveillance in 3 years. Path with 4 TA and 1 hyperplastic polyp.   # Mixed ischemic/nonischemic cardiomyopathy      Recommendations:  -Discussed path with patient and team, repeat colonoscopy in 3 years (2025)      Recommendations preliminary until signed by attending.     Delano Melendez MD  Gastroenterology/Hepatology Fellow  1st option: [phone] (text or call), ONLY available from 7:00 am to 5:00 pm.   **Contact on-call GI fellow via answering service ([phone]) from 5pm-7am AND on weekends/holidays**  2nd option: Available via [employer] Teams  3rd option: Pager: [phone]                 66 year old male with history of mixed Ischemic/NICM Cardiomyopathy (EF 25-30% at baseline, s/p BIV-ICD), CAD (medically managed MIs in 2008,2011, most recent stent in April 2022 to mLAD) presented with multiple near syncope, found to have 41 episodes of VT and admitted initially to cardiac telemetry for further evaluation/management.     # CRC screening: Underwent virtual colonoscopy with inadequate visualization of left colon, however no other findings noted throughout. Performed colonoscopy (6/3):  FIVE polyps found ranging from 3 mm to 8 mm removed via biopsy forceps or cold snare polypectomy. Repeat interval for surveillance in 3 years. Path with 4 TA and 1 hyperplastic polyp.   # Mixed ischemic/nonischemic cardiomyopathy      Recommendations:  -Discussed path with patient and team, repeat colonoscopy in 3 years (2025)      Recommendations preliminary until signed by attending.     Delano Melendez MD  Gastroenterology/Hepatology Fellow  1st option: [phone] (text or call), ONLY available from 7:00 am to 5:00 pm.   **Contact on-call GI fellow via answering service ([phone]) from 5pm-7am AND on weekends/holidays**  2nd option: Available via [employer] Teams  3rd option: Pager: [phone]

## 2022-06-06 NOTE — PROGRESS NOTE ADULT - ASSESSMENT
67 y/o Polish M PMHx mixed Ischemic/NICM cardiomyopathy (EF 25-30% at baseline, s/p BIV-ICD) and CAD, initially presented to [facility] with near syncope, found to intermittent episodes of VT and admitted to cardiac telemetry for further evaluation/management. S/p unsuccessful VT ablation. Now transferred to [facility] CCU for advanced therapies evaluation. S/p [place] placement in CCU, course c/b fever on 5/28, s/p subsequent [place] removal. Blood cultures no growth, no further fevers.     Last fever 100.6 (5/28)  Blood Cultures (5/28) no growth  Urinalysis unremarkable  CT Chest (5/28) mild pulmonary edema    Immunizations:  COVID Pfizer x2 (10/2021)    Pre-Transplant Labs:  HAV IgG+  HBsAb-, HBsAg-, HBcAb-   HCV Ab-  HSV 1/2 IgG +/-  EBV negative  CMV IgG+  Toxoplasma IgG-  VZV IgG+  Measles IgG+  Mumps IgG+   Rubella IgG-  Quantiferon Gold negative  HIV Test negative  Syphilis Screen negative  Strongyloides Ab negative    Impression:  #RUE Thrombophlebitis  #Fever - in setting of central line, now resolved s/p removal. Blood cultures no growth  #Leukocytosis - 2/2 steroids vs phlebitis  #Pre-Advanced Therapies Eval    Recs:  - complete cefazolin 1g IV q8H for RUE phlebitis on 6/7  - RUE US Duplex showing superficial thrombus  -  blood cultures negative  - s/p first dose of hep B vaccine on 6/3/22  - would administer Prevnar 20 vaccine dose    Reggie Escalante MD  Can be called via Teams  After 5pm/weekends [phone]         67 y/o [place] M PMHx mixed Ischemic/NICM cardiomyopathy (EF 25-30% at baseline, s/p BIV-ICD) and CAD, initially presented to [facility] with near syncope, found to intermittent episodes of VT and admitted to cardiac telemetry for further evaluation/management. S/p unsuccessful VT ablation. Now transferred to [facility] CCU for advanced therapies evaluation. S/p [place] placement in CCU, course c/b fever on 5/28, s/p subsequent [place] removal. Blood cultures no growth, no further fevers.     Last fever 100.6 (5/28)  Blood Cultures (5/28) no growth  Urinalysis unremarkable  CT Chest (5/28) mild pulmonary edema    Immunizations:  COVID Pfizer x2 (10/2021)    Pre-Transplant Labs:  HAV IgG+  HBsAb-, HBsAg-, HBcAb-   HCV Ab-  HSV 1/2 IgG +/-  EBV negative  CMV IgG+  Toxoplasma IgG-  VZV IgG+  Measles IgG+  Mumps IgG+   Rubella IgG-  Quantiferon Gold negative  HIV Test negative  Syphilis Screen negative  Strongyloides Ab negative    Impression:  #RUE Thrombophlebitis  #Fever - in setting of central line, now resolved s/p removal. Blood cultures no growth  #Leukocytosis - 2/2 steroids vs phlebitis  #Pre-Advanced Therapies Eval    Recs:  - complete cefazolin 1g IV q8H for RUE phlebitis on 6/7  - RUE US Duplex showing superficial thrombus  -  blood cultures negative  - s/p first dose of hep B vaccine on 6/3/22  - would administer Prevnar 20 vaccine dose  - no contra indication to transplant listing from an ID perspective    Reggie Escalante MD  Can be called via Teams  After 5pm/weekends [phone]

## 2022-06-06 NOTE — PROGRESS NOTE ADULT - PROBLEM SELECTOR PLAN 7
- 100.6 fever on 5/28, cultures NGTD  - Transplant ID continued, appreciate recommendations  - leukocytosis remains elevated, though afebrile.   - right arm concerning for phlebitis, was started on cefazolin IV  - US pending  - blood cultures pending - 100.6 fever on 5/28, cultures NGTD  - Transplant ID continued, appreciate recommendations  - leukocytosis remains elevated, though afebrile.   - right arm concerning for phlebitis, was started on cefazolin IV

## 2022-06-06 NOTE — CHART NOTE - NSCHARTNOTEFT_GEN_A_CORE
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 LHC did not reveal new obstructive CAD and her underwent EPS which did not reveal endocardial substrate amenable for ablation. RHC revealed severely depressed cardiac output and he was transferred to [facility] 5/26 for advanced therapies evaluation. s/p colonoscopy 6/3 with biopsy of polyps. transferred back to CICU after RHC + IABP 6/6.           Vital Signs Last 24 Hrs  T(C): 36.9 (06 Jun 2022 12:15), Max: 36.9 (06 Jun 2022 12:15)  T(F): 98.4 (06 Jun 2022 12:15), Max: 98.4 (06 Jun 2022 12:15)  HR: 80 (06 Jun 2022 14:45) (72 - 80)  BP: 110/77 (06 Jun 2022 14:45) (103/72 - 110/77)  BP(mean): 89 (06 Jun 2022 14:45) (89 - 89)  RR: 18 (06 Jun 2022 14:45) (18 - 18)  SpO2: 94% (06 Jun 2022 14:45) (94% - 98%)  I&O's Summary    05 Jun 2022 07:01  -  06 Jun 2022 07:00  --------------------------------------------------------  IN: 874 mL / OUT: 1800 mL / NET: -926 mL    06 Jun 2022 07:01  -  06 Jun 2022 15:13  --------------------------------------------------------  IN: 240 mL / OUT: 500 mL / NET: -260 mL        MEDICATIONS  (STANDING):  allopurinol 100 milliGRAM(s) Oral daily  aMIOdarone    Tablet 200 milliGRAM(s) Oral daily  aspirin enteric coated 81 milliGRAM(s) Oral daily  atorvastatin 80 milliGRAM(s) Oral at bedtime  ceFAZolin   IVPB 1000 milliGRAM(s) IV Intermittent every 8 hours  chlorhexidine 2% Cloths 1 Application(s) Topical daily  chlorhexidine 4% Liquid 1 Application(s) Topical <User Schedule>  heparin  Infusion 1200 Unit(s)/Hr (12 mL/Hr) IV Continuous <Continuous>  hydrALAZINE 25 milliGRAM(s) Oral every 8 hours  influenza   Vaccine 0.5 milliLiter(s) IntraMuscular once  insulin lispro (ADMELOG) corrective regimen sliding scale   SubCutaneous at bedtime  lidocaine   4% Patch 1 Patch Transdermal daily  metoprolol succinate ER 25 milliGRAM(s) Oral daily  milrinone Infusion 0.5 MICROgram(s)/kG/Min (11 mL/Hr) IV Continuous <Continuous>  pantoprazole    Tablet 40 milliGRAM(s) Oral before breakfast  polyethylene glycol 3350 17 Gram(s) Oral two times a day  senna 2 Tablet(s) Oral at bedtime        LABS                                            13.5                  Neurophils% (auto):   x      (06-06 @ 06:48):    12.01)-----------(336          Lymphocytes% (auto):  x                                             41.0                   Eosinphils% (auto):   x        Manual%: Neutrophils x    ; Lymphocytes x    ; Eosinophils x    ; Bands%: x    ; Blasts x                                    134    |  103    |  27                  Calcium: 9.1   / iCa: x      (06-06 @ 06:50)    ----------------------------<  108       Magnesium: x                                4.5     |  20     |  1.64             Phosphorous: x          ( 06-05 @ 17:34 )   PT: 12.5 sec;   INR: 1.08 ratio  aPTT: x            ASSESSMENT & PLAN:     Assessment/Plan:  67 y/o male w/ PMHx HTN, HLD, type 2 DM, chronic HFrEF (EF 25-30% by Echo), complete heart block s/p PPM (in 2006, upgraded to BiV-ICD in 09/2016), CAD (s/p recent BERNABE mid LAD at [facility] on 04/18/2022), and stage II CKD (baseline Cr ~1.3) presented with near syncope to OSH found to have 41 episodes of VT on device, s/p unsuccessful VT ablation and transferred to [facility] for management of cardiogenic shock and advanced therapy eval.     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 with low diastolic pulmonary gradient  - bedside nipride study done 5/29 with reduction in PVR to <2, still with elevated PA pressures   - SPO2 94-96% on room air     Cardiovascular  # Cardiogenic shock   - TTE 5/21: LV 5.2 cm, LVEF 10-15%, LVOT VTI 10 cm, moderate RV dysfunction, mild AI, minimal MR  - [facility] 5/23: patent mLAD stent with slow flow, D1 with 40-50% stenosis  - C 5/26: RA 12, PA 70/40 (50), PCWP 22, Milana CO/CI 2.3/1.3, MAP 83 with SVR 2469, PVR 12 PERSAUD  - 5/27: RA 10, PA 76/35 (49), PCWP 34, PA sat 57% with Milana CO/CI 3.1/1.6, MAP 71 with SVR 1574, PVR 4.8  - 6/6 RHC: CVP 17, RV 75/4, PA 80/36, PCW 22, CI 1.88. IABP placed and pt transferred to CICU.   - Maintain Milrinone .5mcg/kg/min   - Cont hydral 25 q8 and toprol 25 qd   - monitor strict IOs and daily wts   - repeat MVO2 and SCr, if SCR sdowntrending with IABP in place will proceed with listing for OHT  - maintain IABP on 1:1, daily CXRs to assess placement   - monitor hemodynamics via swan and perfusion indices   - HF following     # VTach   - s/p EPS on 5/24, unable to perform ablation as no substrate found and did not induce VT because of severely low EF   - Interrogation of ICD @Dr Wilson's office 5/20: back-to-back episodes of VT @ 200+ bpm all terminating with ATP. Since last cath pt has had 41 VT episodes (all falling into VF zone)  - Normal device function. BIV pacing 97%. No programming changes made  -. c/w Amiodarone  - pending OHT listing   - no further VT on inotropes     # CAD   - Chest pain free and compliant with DAPT since last PCI 4/18/22  - Cardiac cath @[facility] 4/18/22: mLAD 90% s/p BERNABE, LCx mild disease, RCA mild disease s/p diagnostic cardiac cath w/ Dr Wagner on 05/23/2022   - Cont ASA and Lipitor   - per discussion with interventional at OSH, ok to DC plavix, last dose 5/28    GI  - No active issues  - Cont diabetic diet   - Cont. PPI and bowel regimen, last BM 6/6  - s/p colonoscopy 6/3 with 5 polyps removed and biopsied, benign findings    Renal  # JAGRUTI on CKD II  Admitted w/ Cr 2.01 (baseline Cr ~1.3) was downtrending with inotropic support, 6/5 started increasing again to 1.64  - likely i/s/o cardiogenic shock given RHC findings and CI of 1.88  - Avoid nephrotoxic agents, NSAIDs. Renally dose meds.  - monitor strict IOs and continue current cardiac support, repat SCr 4PM post IABP insertion     Heme/Onc  - No active issues  - start heparin gtt with PTT goal 50-60 for IABP  - H/H acceptable     Endo   # T2DM (A1C 6.2 on admission)  - Cont. 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 5/20 with near syncope in setting of worsening HF symptoms and found to have 41 episodes of VT, many terminating with ATP. LHC did not reveal new obstructive CAD and her underwent EPS which did not reveal endocardial substrate amenable for ablation. RHC revealed severely depressed cardiac output and he was transferred to [facility] 5/26 for advanced therapies evaluation. s/p colonoscopy 6/3 with biopsy of polyps. transferred back to CICU after RHC + IABP 6/6.           Vital Signs Last 24 Hrs  T(C): 36.9 (06 Jun 2022 12:15), Max: 36.9 (06 Jun 2022 12:15)  T(F): 98.4 (06 Jun 2022 12:15), Max: 98.4 (06 Jun 2022 12:15)  HR: 80 (06 Jun 2022 14:45) (72 - 80)  BP: 110/77 (06 Jun 2022 14:45) (103/72 - 110/77)  BP(mean): 89 (06 Jun 2022 14:45) (89 - 89)  RR: 18 (06 Jun 2022 14:45) (18 - 18)  SpO2: 94% (06 Jun 2022 14:45) (94% - 98%)  I&O's Summary    05 Jun 2022 07:01  -  06 Jun 2022 07:00  --------------------------------------------------------  IN: 874 mL / OUT: 1800 mL / NET: -926 mL    06 Jun 2022 07:01  -  06 Jun 2022 15:13  --------------------------------------------------------  IN: 240 mL / OUT: 500 mL / NET: -260 mL        MEDICATIONS  (STANDING):  allopurinol 100 milliGRAM(s) Oral daily  aMIOdarone    Tablet 200 milliGRAM(s) Oral daily  aspirin enteric coated 81 milliGRAM(s) Oral daily  atorvastatin 80 milliGRAM(s) Oral at bedtime  ceFAZolin   IVPB 1000 milliGRAM(s) IV Intermittent every 8 hours  chlorhexidine 2% Cloths 1 Application(s) Topical daily  chlorhexidine 4% Liquid 1 Application(s) Topical <User Schedule>  heparin  Infusion 1200 Unit(s)/Hr (12 mL/Hr) IV Continuous <Continuous>  hydrALAZINE 25 milliGRAM(s) Oral every 8 hours  influenza   Vaccine 0.5 milliLiter(s) IntraMuscular once  insulin lispro (ADMELOG) corrective regimen sliding scale   SubCutaneous at bedtime  lidocaine   4% Patch 1 Patch Transdermal daily  metoprolol succinate ER 25 milliGRAM(s) Oral daily  milrinone Infusion 0.5 MICROgram(s)/kG/Min (11 mL/Hr) IV Continuous <Continuous>  pantoprazole    Tablet 40 milliGRAM(s) Oral before breakfast  polyethylene glycol 3350 17 Gram(s) Oral two times a day  senna 2 Tablet(s) Oral at bedtime        LABS                                            13.5                  Neurophils% (auto):   x      (06-06 @ 06:48):    12.01)-----------(336          Lymphocytes% (auto):  x                                             41.0                   Eosinphils% (auto):   x        Manual%: Neutrophils x    ; Lymphocytes x    ; Eosinophils x    ; Bands%: x    ; Blasts x                                    134    |  103    |  27                  Calcium: 9.1   / iCa: x      (06-06 @ 06:50)    ----------------------------<  108       Magnesium: x                                4.5     |  20     |  1.64             Phosphorous: x          ( 06-05 @ 17:34 )   PT: 12.5 sec;   INR: 1.08 ratio  aPTT: x            ASSESSMENT & PLAN:     Assessment/Plan:  67 y/o male w/ PMHx HTN, HLD, type 2 DM, chronic HFrEF (EF 25-30% by Echo), complete heart block s/p PPM (in 2006, upgraded to BiV-ICD in 09/2016), CAD (s/p recent BERNABE mid LAD at [facility] on 04/18/2022), and stage II CKD (baseline Cr ~1.3) presented with near syncope to OSH found to have 41 episodes of VT on device, s/p unsuccessful VT ablation and transferred to [facility] for management of cardiogenic shock and advanced therapy eval.     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 with low diastolic pulmonary gradient  - bedside nipride study done 5/29 with reduction in PVR to <2, still with elevated PA pressures   - SPO2 94-96% on room air     Cardiovascular  # Cardiogenic shock   - TTE 5/21: LV 5.2 cm, LVEF 10-15%, LVOT VTI 10 cm, moderate RV dysfunction, mild AI, minimal MR  - [facility] 5/23: patent mLAD stent with slow flow, D1 with 40-50% stenosis  - C 5/26: RA 12, PA 70/40 (50), PCWP 22, Milana CO/CI 2.3/1.3, MAP 83 with SVR 2469, PVR 12 PERSAUD  - 5/27: RA 10, PA 76/35 (49), PCWP 34, PA sat 57% with Milana CO/CI 3.1/1.6, MAP 71 with SVR 1574, PVR 4.8  - 6/6 RHC: CVP 17, RV 75/4, PA 80/36, PCW 22, CI 1.88. IABP placed and pt transferred to CICU.   - Maintain Milrinone .5mcg/kg/min   - Cont hydral 25 q8 and toprol 25 qd   - monitor strict IOs and daily wts   - repeat MVO2 and SCr, if SCR sdowntrending with IABP in place will proceed with listing for OHT  - maintain IABP on 1:1, daily CXRs to assess placement   - monitor hemodynamics via swan and perfusion indices   - HF following     # VTach   - s/p EPS on 5/24, unable to perform ablation as no substrate found and did not induce VT because of severely low EF   - Interrogation of ICD @Dr Wilson's office 5/20: back-to-back episodes of VT @ 200+ bpm all terminating with ATP. Since last cath pt has had 41 VT episodes (all falling into VF zone)  - Normal device function. BIV pacing 97%. No programming changes made  -. c/w Amiodarone  - pending OHT listing   - no further VT on inotropes     # CAD   - Chest pain free and compliant with DAPT since last PCI 4/18/22  - Cardiac cath @[facility] 4/18/22: mLAD 90% s/p BERNABE, LCx mild disease, RCA mild disease s/p diagnostic cardiac cath w/ Dr Wagner on 05/23/2022   - Cont ASA and Lipitor   - per discussion with interventional at OSH, ok to DC plavix, last dose 5/28    GI  - No active issues  - Cont diabetic diet   - Cont. PPI and bowel regimen, last BM 6/6  - s/p colonoscopy 6/3 with 5 polyps removed and biopsied, benign findings    Renal  # JAGRUTI on CKD II  Admitted w/ Cr 2.01 (baseline Cr ~1.3) was downtrending with inotropic support, 6/5 started increasing again to 1.64  - likely i/s/o cardiogenic shock given RHC findings and CI of 1.88  - Avoid nephrotoxic agents, NSAIDs. Renally dose meds.  - monitor strict IOs and continue current cardiac support, repat SCr 4PM post IABP insertion     Heme/Onc  - No active issues  - start heparin gtt with PTT goal 50-60 for IABP  - H/H acceptable     Endo   # T2DM (A1C 6.2 on admission)  - Cont. 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 LHC did not reveal new obstructive CAD and her underwent EPS which did not reveal endocardial substrate amenable for ablation. RHC revealed severely depressed cardiac output and he was transferred to [facility] 5/26 for advanced therapies evaluation. s/p colonoscopy 6/3 with biopsy of polyps. transferred back to CICU after RHC + IABP 6/6.           Vital Signs Last 24 Hrs  T(C): 36.9 (06 Jun 2022 12:15), Max: 36.9 (06 Jun 2022 12:15)  T(F): 98.4 (06 Jun 2022 12:15), Max: 98.4 (06 Jun 2022 12:15)  HR: 80 (06 Jun 2022 14:45) (72 - 80)  BP: 110/77 (06 Jun 2022 14:45) (103/72 - 110/77)  BP(mean): 89 (06 Jun 2022 14:45) (89 - 89)  RR: 18 (06 Jun 2022 14:45) (18 - 18)  SpO2: 94% (06 Jun 2022 14:45) (94% - 98%)  I&O's Summary    05 Jun 2022 07:01  -  06 Jun 2022 07:00  --------------------------------------------------------  IN: 874 mL / OUT: 1800 mL / NET: -926 mL    06 Jun 2022 07:01  -  06 Jun 2022 15:13  --------------------------------------------------------  IN: 240 mL / OUT: 500 mL / NET: -260 mL        MEDICATIONS  (STANDING):  allopurinol 100 milliGRAM(s) Oral daily  aMIOdarone    Tablet 200 milliGRAM(s) Oral daily  aspirin enteric coated 81 milliGRAM(s) Oral daily  atorvastatin 80 milliGRAM(s) Oral at bedtime  ceFAZolin   IVPB 1000 milliGRAM(s) IV Intermittent every 8 hours  chlorhexidine 2% Cloths 1 Application(s) Topical daily  chlorhexidine 4% Liquid 1 Application(s) Topical <User Schedule>  heparin  Infusion 1200 Unit(s)/Hr (12 mL/Hr) IV Continuous <Continuous>  hydrALAZINE 25 milliGRAM(s) Oral every 8 hours  influenza   Vaccine 0.5 milliLiter(s) IntraMuscular once  insulin lispro (ADMELOG) corrective regimen sliding scale   SubCutaneous at bedtime  lidocaine   4% Patch 1 Patch Transdermal daily  metoprolol succinate ER 25 milliGRAM(s) Oral daily  milrinone Infusion 0.5 MICROgram(s)/kG/Min (11 mL/Hr) IV Continuous <Continuous>  pantoprazole    Tablet 40 milliGRAM(s) Oral before breakfast  polyethylene glycol 3350 17 Gram(s) Oral two times a day  senna 2 Tablet(s) Oral at bedtime        LABS                                            13.5                  Neurophils% (auto):   x      (06-06 @ 06:48):    12.01)-----------(336          Lymphocytes% (auto):  x                                             41.0                   Eosinphils% (auto):   x        Manual%: Neutrophils x    ; Lymphocytes x    ; Eosinophils x    ; Bands%: x    ; Blasts x                                    134    |  103    |  27                  Calcium: 9.1   / iCa: x      (06-06 @ 06:50)    ----------------------------<  108       Magnesium: x                                4.5     |  20     |  1.64             Phosphorous: x          ( 06-05 @ 17:34 )   PT: 12.5 sec;   INR: 1.08 ratio  aPTT: x            ASSESSMENT & PLAN:     Assessment/Plan:  65 y/o male w/ PMHx HTN, HLD, type 2 DM, chronic HFrEF (EF 25-30% by Echo), complete heart block s/p PPM (in 2006, upgraded to BiV-ICD in 09/2016), CAD (s/p recent BERNABE mid LAD at [facility] on 04/18/2022), and stage II CKD (baseline Cr ~1.3) presented with near syncope to OSH found to have 41 episodes of VT on device, s/p unsuccessful VT ablation and transferred to [facility] for management of cardiogenic shock and advanced therapy eval.     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 with low diastolic pulmonary gradient  - bedside nipride study done 5/29 with reduction in PVR to <2, still with elevated PA pressures   - SPO2 94-96% on room air     Cardiovascular  # Cardiogenic shock   - TTE 5/21: LV 5.2 cm, LVEF 10-15%, LVOT VTI 10 cm, moderate RV dysfunction, mild AI, minimal MR  - [facility] 5/23: patent mLAD stent with slow flow, D1 with 40-50% stenosis  - C 5/26: RA 12, PA 70/40 (50), PCWP 22, Milana CO/CI 2.3/1.3, MAP 83 with SVR 2469, PVR 12 PERSAUD  - 5/27: RA 10, PA 76/35 (49), PCWP 34, PA sat 57% with Milana CO/CI 3.1/1.6, MAP 71 with SVR 1574, PVR 4.8  - 6/6 RHC: CVP 17, RV 75/4, PA 80/36, PCW 22, CI 1.88. IABP placed and pt transferred to CICU.   - Maintain Milrinone .5mcg/kg/min   - Cont hydral 25 q8 and toprol 25 qd   - monitor strict IOs and daily wts   - repeat MVO2 and SCr, if SCR sdowntrending with IABP in place will proceed with listing for OHT  - maintain IABP on 1:1, daily CXRs to assess placement   - monitor hemodynamics via swan and perfusion indices   - HF following     # VTach   - s/p EPS on 5/24, unable to perform ablation as no substrate found and did not induce VT because of severely low EF   - Interrogation of ICD @Dr Wilson's office 5/20: back-to-back episodes of VT @ 200+ bpm all terminating with ATP. Since last cath pt has had 41 VT episodes (all falling into VF zone)  - Normal device function. BIV pacing 97%. No programming changes made  -. c/w Amiodarone  - pending OHT listing   - no further VT on inotropes     # CAD   - Chest pain free and compliant with DAPT since last PCI 4/18/22  - Cardiac cath @[facility] 4/18/22: mLAD 90% s/p BERNABE, LCx mild disease, RCA mild disease s/p diagnostic cardiac cath w/ Dr Wagner on 05/23/2022   - Cont ASA and Lipitor   - per discussion with interventional at OSH, ok to DC plavix, last dose 5/28    GI  - No active issues  - Cont diabetic diet   - Cont. PPI and bowel regimen, last BM 6/6  - s/p colonoscopy 6/3 with 5 polyps removed and biopsied, benign findings    Renal  # JAGRUTI on CKD II  Admitted w/ Cr 2.01 (baseline Cr ~1.3) was downtrending with inotropic support, 6/5 started increasing again to 1.64  - likely i/s/o cardiogenic shock given RHC findings and CI of 1.88  - Avoid nephrotoxic agents, NSAIDs. Renally dose meds.  - monitor strict IOs and continue current cardiac support, repat SCr 4PM post IABP insertion     Heme/Onc  - No active issues  - start heparin gtt with PTT goal 50-60 for IABP  - H/H acceptable     Endo   # T2DM (A1C 6.2 on admission)  - Cont. ISS, glucose controlled, monitor FS closely     MSK  # Gout flare, right knee   - continue allopurinol, s/p prednisone      ID  # RUE Phlebitis   Afebrile, no leukocytosis   - RUE US Duplex showing superficial thrombus  cont ancef 1g q8    Lines  - RIJ [place] 6/6  - RF IABP 6/6    Attending Addendum:    I have personally seen, examined and participated in the care of this patient. I have reviewed all pertinent clinical information, including history, physical exam, plan and the advanced care provider's note. I agree with the advanced care provider's note with the following additions:    Presented to [facility] with VT, found to be in cardiogenic shock and transferred to [facility]  A+Ox3  Listed for heart transplant  Cardiogenic shock, on Milrinone and IABP - maintain until transplant  On Hydralazine for afterload reduction - will add Isordil and increase Hydralazine  Contine Toprol for VT  S/p stent in 4/22, continue ASA but holding Plavix   O2 sats mid 90s on room air  DASH/diabetic diet   Non-oliguric JAGRUTI, CVP 10 - target even to 500 cc negative   H/H acceptable on Heparin drip for IABP  Afebrile, continue Ancef for UE cellulitis, ID is following  Sugars controlled  RIJ [place] and IABP 6/6    The patient required critical care management and I personally provided 75 minutes of non-continuous care to the patient concurrently with the resident/fellow/nurse practitioner, excluding separate procedures and time spent teaching, in addition to discussing the patient and plan at length with the CICU staff and helping coordinate care.

## 2022-06-06 NOTE — PROGRESS NOTE ADULT - ASSESSMENT
Past psychiatric history: Denied.      Psychological assessment: Feels he is coping better with stress related to hospitalization and need for advanced therapies. Asked patient to teach back education on heart transplant. Able to identify risk of rejection and need to take medication specific for transplant. Review of education provided. Patient encouraged to read heart transplant book. Rohini with hospitalization and stress related to needing advanced therapies by talking with his wife and praying. Appetite good. Sleeping well. Denies SI/HI. Receptive to supportive therapy.     Cognitive screen: Performed within normal range on brief cognitive screen, MOCA 26/30. Reports he has noticed some mild issues with memory over past few months, sometimes forgets where he has put things. Denies any problems with remembering to take medication or MD appointments.        Mental Status Exam: Seen resting in bed on CICU. Has IABP in place. Well related. Maintained appropriate eye contact. Speech normal volume and rate. Thought process logical and goal-oriented, content appropriate to conversation. No evidence of jack, delusions, psychosis. Denies SI/HI. Mood "is euthymic. Affect full range. Insight into disease adequate. Immediate judgment good.      Dx: Adjustment disorder, unspecified. F43.20  Systolic heart failure, I50.2    Recommendations:   Behavioral Cardiology will follow as needed.    service  Would benefit from repeat memory assessment  Needs additional education on heart failure guidelines, as well as LVAD and HT  Would benefit from involving family members in communication regarding medical status and recommendations  Patient would benefit from continued involvement from behavioral cardiology      30 minutes spent on patient encounter       Past psychiatric history: Denied.      Psychological assessment: Feels he is coping better with stress related to hospitalization and need for advanced therapies. Asked patient to teach back education on heart transplant. Able to identify risk of rejection and need to take medication specific for transplant. Review of education provided. Patient encouraged to read heart transplant book. Rohini with stress by talking with his wife and praying. Appetite good. Sleeping well. Denies SI/HI. Receptive to supportive therapy.     Cognitive screen: Performed within normal range on brief cognitive screen, MOCA 26/30. Reports he has noticed some mild issues with memory over past few months, sometimes forgets where he has put things. Denies any problems with remembering to take medication or MD appointments.        Mental Status Exam: Seen resting in bed on CICU. Has IABP in place. Well related. Maintained appropriate eye contact. Speech normal volume and rate. Thought process logical and goal-oriented, content appropriate to conversation. No evidence of jack, delusions, psychosis. Denies SI/HI. Mood euthymic. Affect full range. Insight into disease adequate. Immediate judgment good.      Dx: Adjustment disorder, unspecified. F43.20  Systolic heart failure, I50.2    Recommendations:   Behavioral Cardiology will follow as needed.    service  Review heart failure guidelines and transplant education   Would benefit from involving family members in communication regarding medical status and recommendations  Patient would benefit from continued involvement from behavioral cardiology      30 minutes spent on patient encounter

## 2022-06-06 NOTE — PROGRESS NOTE ADULT - PROBLEM SELECTOR PLAN 1
No acute SOB, no chest pain. Afebrile.  - undergoing eval for OHT and LVAD, RHC w/ IABP planned for TODAY Monday 6/6  - s/p colonoscopy on 6/2. At this time had 5 polyps removed, awaiting results of biopsy  - milrinone @ 0.5 mcg/kg/min  - c/w metoprolol ER 25mg/d, hydral 25mg tid  - continue amiodarone 200mg/d

## 2022-06-06 NOTE — PROGRESS NOTE ADULT - SUBJECTIVE AND OBJECTIVE BOX
GOCOOL, LENNOX  MRN-[id number]  Patient is a 66y old  Male who presents with a chief complaint of LVAD/OHT eval (06 Jun 2022 18:24)    HPI:  66M Mixed Ischemic/NICM Cardiomyopathy (EF 25-30% at baseline, s/p BIV-ICD), CAD (medically managed MIs in 2008,2011, Most recent stent in April 2022 to mLAD) presented with multiple near syncope, found to have 41 episodes of VT and admitted initially to cardiac telemetry for further evaluation/management. Ischemic evaluation without new disease and VT ablation without substrate to complete ablation.   Transferred from [facility] for further management and evaluation for LVAD vs OHT.    (26 May 2022 20:31)      Surgery/Hospital course:    Overnight events:    REVIEW OF SYSTEMS:    CONSTITUTIONAL: No weakness, fevers or chills  EYES/ENT: No visual changes;  No vertigo or throat pain   NECK: No pain or stiffness  RESPIRATORY: No cough, wheezing, hemoptysis; No shortness of breath  CARDIOVASCULAR: No chest pain or palpitations  GASTROINTESTINAL: No abdominal or epigastric pain. No nausea, vomiting, or hematemesis; No diarrhea or constipation. No melena or hematochezia.  GENITOURINARY: No dysuria, frequency or hematuria  NEUROLOGICAL: No numbness or weakness  SKIN: No itching, rashes      Physical Exam:  Vital Signs Last 24 Hrs  T(C): 37 (06 Jun 2022 15:00), Max: 37 (06 Jun 2022 15:00)  T(F): 98.6 (06 Jun 2022 15:00), Max: 98.6 (06 Jun 2022 15:00)  HR: 80 (06 Jun 2022 18:00) (72 - 80)  BP: 110/77 (06 Jun 2022 14:45) (103/72 - 110/77)  BP(mean): 89 (06 Jun 2022 14:45) (89 - 89)  RR: 18 (06 Jun 2022 17:00) (17 - 21)  SpO2: 95% (06 Jun 2022 17:00) (94% - 98%)  Physical Exam:   Constitutional: NAD, well-groomed, well-developed  HEENT: PERRLA, EOMI, no drainage or redness  Neck: supple,  No JVD  Respiratory: Breath Sounds equal & clear bilaterally to auscultation, no rales/rhonchi/wheezing, no accessory muscle use noted  Cardiovascular: Regular rate, regular rhythm, normal S1, S2; no murmurs or rub  Gastrointestinal: Soft, non-tender, non distended, + bowel sounds  Extremities: BRADFORD x 4, no peripheral edema, no cyanosis, no clubbing   Neurological: A+O x 3; speech clear and intact; no sensory, motor  deficits, normal reflexes  Skin: warm, dry, well perfused  Incisions:    ============================I/O===========================   I&O's Detail    05 Jun 2022 07:01  -  06 Jun 2022 07:00  --------------------------------------------------------  IN:    IV PiggyBack: 100 mL    Milrinone: 220 mL    Oral Fluid: 554 mL  Total IN: 874 mL    OUT:    Voided (mL): 1800 mL  Total OUT: 1800 mL    Total NET: -926 mL      06 Jun 2022 07:01  -  06 Jun 2022 19:14  --------------------------------------------------------  IN:    Heparin: 36 mL    IV PiggyBack: 50 mL    Milrinone: 33 mL    Oral Fluid: 240 mL  Total IN: 359 mL    OUT:    Voided (mL): 900 mL  Total OUT: 900 mL    Total NET: -541 mL        ============================ LABS =========================                        13.5   10.68 )-----------( 292      ( 06 Jun 2022 16:22 )             41.9     06-06    135  |  104  |  24<H>  ----------------------------<  111<H>  4.2   |  19<L>  |  1.35<H>    Ca    8.8      06 Jun 2022 16:22  Phos  2.8     06-06  Mg     1.9     06-06    TPro  6.1  /  Alb  3.0<L>  /  TBili  0.7  /  DBili  x   /  AST  29  /  ALT  22  /  AlkPhos  53  06-06    LIVER FUNCTIONS - ( 06 Jun 2022 16:22 )  Alb: 3.0 g/dL / Pro: 6.1 g/dL / ALK PHOS: 53 U/L / ALT: 22 U/L / AST: 29 U/L / GGT: x           PT/INR - ( 06 Jun 2022 16:22 )   PT: 12.9 sec;   INR: 1.12 ratio         PTT - ( 06 Jun 2022 16:22 )  PTT:29.6 sec      ======================Micro/Rad/Cardio=================  Culture: Reviewed   CXR: Reviewed  Echo:Reviewed  ======================================================  PAST MEDICAL & SURGICAL HISTORY:  AV block      Essential hypertension      Chronic HFrEF (heart failure with reduced ejection fraction)      Chronic kidney disease, unspecified CKD stage      CAD (coronary artery disease)      HLD (hyperlipidemia)      Type 2 diabetes mellitus      Artificial cardiac pacemaker        ====================ASSESSMENT ==============  CNS:  RES:  CVS:  Hem:  Norman:  GI:  Endo:  ID:  Skin  Plan:  ====================== NEUROLOGY=====================  acetaminophen     Tablet .. 650 milliGRAM(s) Oral every 6 hours PRN Temp greater or equal to 38C (100.4F), Mild Pain (1 - 3)    ==================== RESPIRATORY======================  Mechanical Ventilation:      ====================CARDIOVASCULAR==================  aMIOdarone    Tablet 200 milliGRAM(s) Oral daily  hydrALAZINE 37.5 milliGRAM(s) Oral every 8 hours  isosorbide   dinitrate Tablet (ISORDIL) 10 milliGRAM(s) Oral three times a day  metoprolol succinate ER 25 milliGRAM(s) Oral daily  milrinone Infusion 0.5 MICROgram(s)/kG/Min (11 mL/Hr) IV Continuous <Continuous>    ===================HEMATOLOGIC/ONC ===================  aspirin enteric coated 81 milliGRAM(s) Oral daily  heparin  Infusion 1200 Unit(s)/Hr (12 mL/Hr) IV Continuous <Continuous>    ===================== RENAL =========================  Continue monitoring urine output    ==================== GASTROINTESTINAL===================  pantoprazole    Tablet 40 milliGRAM(s) Oral before breakfast  polyethylene glycol 3350 17 Gram(s) Oral two times a day  senna 2 Tablet(s) Oral at bedtime    =======================    ENDOCRINE  =====================  allopurinol 100 milliGRAM(s) Oral daily  atorvastatin 80 milliGRAM(s) Oral at bedtime  insulin lispro (ADMELOG) corrective regimen sliding scale   SubCutaneous at bedtime    ========================INFECTIOUS DISEASE================  ceFAZolin   IVPB 1000 milliGRAM(s) IV Intermittent every 8 hours      ========================PROPHYLACTIC MEASURE================  DVT  GI Protonix  Graft patency   Beta blocker      Patient requires continuous monitoring with bedside rhythm monitoring, arterial line, pulse oximetry, ventilator monitoring and intermittent blood gas analysis.  Care plan discussed with ICU care team.  Patient remained critical; required more than usual post op care; I have spent 35 minutes providing non-routine post op care, revaluated multiple times during the day.         GOCOOL, LENNOX  MRN-[id number]  Patient is a 66y old  Male who presents with a chief complaint of LVAD/OHT eval (06 Jun 2022 18:24)    HPI:  66M Mixed Ischemic/NICM Cardiomyopathy (EF 25-30% at baseline, s/p BIV-ICD), CAD (medically managed MIs in 2008,2011, Most recent stent in April 2022 to mLAD) presented with multiple near syncope, found to have 41 episodes of VT and admitted initially to cardiac telemetry for further evaluation/management. Ischemic evaluation without new disease and VT ablation without substrate to complete ablation.   Transferred from [facility] for further management and evaluation for LVAD vs OHT.    (26 May 2022 20:31)    24 hr events: no acute events     REVIEW OF SYSTEMS:    CONSTITUTIONAL: No weakness, fevers or chills  EYES/ENT: No visual changes;  No vertigo or throat pain   NECK: No pain or stiffness  RESPIRATORY: No cough, wheezing, hemoptysis; No shortness of breath  CARDIOVASCULAR: No chest pain or palpitations  GASTROINTESTINAL: No abdominal or epigastric pain. No nausea, vomiting, or hematemesis; No diarrhea or constipation. No melena or hematochezia.  GENITOURINARY: No dysuria, frequency or hematuria  NEUROLOGICAL: No numbness or weakness  SKIN: No itching, rashes      Physical Exam:  Vital Signs Last 24 Hrs  T(C): 37 (06 Jun 2022 15:00), Max: 37 (06 Jun 2022 15:00)  T(F): 98.6 (06 Jun 2022 15:00), Max: 98.6 (06 Jun 2022 15:00)  HR: 80 (06 Jun 2022 18:00) (72 - 80)  BP: 110/77 (06 Jun 2022 14:45) (103/72 - 110/77)  BP(mean): 89 (06 Jun 2022 14:45) (89 - 89)  RR: 18 (06 Jun 2022 17:00) (17 - 21)  SpO2: 95% (06 Jun 2022 17:00) (94% - 98%)      ============================I/O===========================   I&O's Detail    05 Jun 2022 07:01  -  06 Jun 2022 07:00  --------------------------------------------------------  IN:    IV PiggyBack: 100 mL    Milrinone: 220 mL    Oral Fluid: 554 mL  Total IN: 874 mL    OUT:    Voided (mL): 1800 mL  Total OUT: 1800 mL    Total NET: -926 mL      06 Jun 2022 07:01  -  06 Jun 2022 19:14  --------------------------------------------------------  IN:    Heparin: 36 mL    IV PiggyBack: 50 mL    Milrinone: 33 mL    Oral Fluid: 240 mL  Total IN: 359 mL    OUT:    Voided (mL): 900 mL  Total OUT: 900 mL    Total NET: -541 mL        ============================ LABS =========================                        13.5   10.68 )-----------( 292      ( 06 Jun 2022 16:22 )             41.9     06-06    135  |  104  |  24<H>  ----------------------------<  111<H>  4.2   |  19<L>  |  1.35<H>    Ca    8.8      06 Jun 2022 16:22  Phos  2.8     06-06  Mg     1.9     06-06    TPro  6.1  /  Alb  3.0<L>  /  TBili  0.7  /  DBili  x   /  AST  29  /  ALT  22  /  AlkPhos  53  06-06    LIVER FUNCTIONS - ( 06 Jun 2022 16:22 )  Alb: 3.0 g/dL / Pro: 6.1 g/dL / ALK PHOS: 53 U/L / ALT: 22 U/L / AST: 29 U/L / GGT: x           PT/INR - ( 06 Jun 2022 16:22 )   PT: 12.9 sec;   INR: 1.12 ratio         PTT - ( 06 Jun 2022 16:22 )  PTT:29.6 sec      ======================Micro/Rad/Cardio=================  Culture: Reviewed   CXR: Reviewed  Echo:Reviewed  ======================================================  PAST MEDICAL & SURGICAL HISTORY:  AV block      Essential hypertension      Chronic HFrEF (heart failure with reduced ejection fraction)      Chronic kidney disease, unspecified CKD stage      CAD (coronary artery disease)      HLD (hyperlipidemia)      Type 2 diabetes mellitus      Artificial cardiac pacemaker        ====================ASSESSMENT ==============  67 y/o male w/ PMHx HTN, HLD, type 2 DM, chronic HFrEF (EF 25-30% by Echo), complete heart block s/p PPM (in 2006, upgraded to BiV-ICD in 09/2016), CAD (s/p recent BERNABE mid LAD at [facility] on 04/18/2022), and stage II CKD (baseline Cr ~1.3) presented with near syncope to OSH found to have 41 episodes of VT on device, s/p unsuccessful VT ablation and transferred to [facility] for management of cardiogenic shock and advanced therapy eval.       Plan:  ====================== NEUROLOGY=====================  - AAOx3  - No active issues    acetaminophen     Tablet .. 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 low diastolic pulmonary gradient  - bedside nipride study done 5/29 with reduction in PVR to <2, still with elevated PA pressures   - SPO2 94-96% on room air     ====================CARDIOVASCULAR==================  Cardiogenic shock   - TTE 5/21: LV 5.2 cm, LVEF 10-15%, LVOT VTI 10 cm, moderate RV dysfunction, mild AI, minimal MR  - LHC 5/23: patent mLAD stent with slow flow, D1 with 40-50% stenosis  - RHC 5/26: RA 12, PA 70/40 (50), PCWP 22, Milana CO/CI 2.3/1.3, MAP 83 with SVR 2469, PVR 12 PERSAUD  - 5/27: RA 10, PA 76/35 (49), PCWP 34, PA sat 57% with Milana CO/CI 3.1/1.6, MAP 71 with SVR 1574, PVR 4.8  - 6/6 RHC: CVP 17, RV 75/4, PA 80/36, PCW 22, CI 1.88. IABP placed and pt transferred to CICU.   - Maintain Milrinone .5mcg/kg/min   - Cont hydral 25 q8 and toprol 25 qd   - monitor strict IOs and daily wts   - repeat MVO2 and SCr, if SCR sdowntrending with IABP in place will proceed with listing for OHT  - maintain IABP on 1:1, daily CXRs to assess placement   - monitor hemodynamics via swan and perfusion indices   - HF following     VTach   - s/p EPS on 5/24, unable to perform ablation as no substrate found and did not induce VT because of severely low EF   - Interrogation of ICD @Dr Wilson's office 5/20: back-to-back episodes of VT @ 200+ bpm all terminating with ATP. Since last cath pt has had 41 VT episodes (all falling into VF zone)  - Normal device function. BIV pacing 97%. No programming changes made  -. c/w Amiodarone  - pending OHT listing   - no further VT on inotropes     CAD   - Chest pain free and compliant with DAPT since last PCI 4/18/22  - Cardiac cath @[facility] 4/18/22: mLAD 90% s/p BERNABE, LCx mild disease, RCA mild disease s/p diagnostic cardiac cath w/ Dr Wagner on 05/23/2022   - Cont ASA and Lipitor   - per discussion with interventional at OSH, ok to DC plavix, last dose 5/28    aMIOdarone    Tablet 200 milliGRAM(s) Oral daily  hydrALAZINE 37.5 milliGRAM(s) Oral every 8 hours  isosorbide   dinitrate Tablet (ISORDIL) 10 milliGRAM(s) Oral three times a day  metoprolol succinate ER 25 milliGRAM(s) Oral daily  milrinone Infusion 0.5 MICROgram(s)/kG/Min (11 mL/Hr) IV Continuous <Continuous>    ===================HEMATOLOGIC/ONC ===================  - No active issues  - start heparin gtt with PTT goal 50-60 for IABP  - H/H acceptable     aspirin enteric coated 81 milliGRAM(s) Oral daily  heparin  Infusion 1200 Unit(s)/Hr (12 mL/Hr) IV Continuous <Continuous>    ===================== RENAL =========================  JGARUTI on CKD II  Admitted w/ Cr 2.01 (baseline Cr ~1.3) was downtrending with inotropic support, 6/5 started increasing again to 1.64  - likely i/s/o cardiogenic shock given RHC findings and CI of 1.88  - Avoid nephrotoxic agents, NSAIDs. Renally dose meds.  - monitor strict IOs and continue current cardiac support, repat SCr 4PM post IABP insertion     ==================== GASTROINTESTINAL===================  - No active issues  - Cont diabetic diet   - Cont. PPI and bowel regimen, last BM 6/6  - s/p colonoscopy 6/3 with 5 polyps removed and biopsied, benign findings      pantoprazole    Tablet 40 milliGRAM(s) Oral before breakfast  polyethylene glycol 3350 17 Gram(s) Oral two times a day  senna 2 Tablet(s) Oral at bedtime    =======================    ENDOCRINE  =====================  T2DM (A1C 6.2 on admission)  - Cont. ISS, glucose controlled, monitor FS closely     allopurinol 100 milliGRAM(s) Oral daily  atorvastatin 80 milliGRAM(s) Oral at bedtime  insulin lispro (ADMELOG) corrective regimen sliding scale   SubCutaneous at bedtime    ========================INFECTIOUS DISEASE================  RUE Phlebitis   Afebrile, no leukocytosis   - RUE US Duplex showing superficial thrombus  cont ancef 1g q8    ceFAZolin   IVPB 1000 milliGRAM(s) IV Intermittent every 8 hours      Lines  - RIJ [place] 6/6  - RF IABP 6/6        Patient requires continuous monitoring with bedside rhythm monitoring, pulse ox monitoring, and intermittent blood gas analysis. Care plan discussed with ICU care team. Patient remained critical and at risk for life threatening decompensation.  Patient seen, examined and plan discussed with CCU team during rounds.     I have personally provided 35 minutes of critical care time excluding time spent on separate procedures, in addition to initial critical care time provided by the CICU Attending, Dr. Leonardo         GOCOOL, LENNOX  MRN-[id number]  Patient is a 66y old  Male who presents with a chief complaint of LVAD/OHT eval (06 Jun 2022 18:24)    HPI:  66M Mixed Ischemic/NICM Cardiomyopathy (EF 25-30% at baseline, s/p BIV-ICD), CAD (medically managed MIs in 2008,2011, Most recent stent in April 2022 to mLAD) presented with multiple near syncope, found to have 41 episodes of VT and admitted initially to cardiac telemetry for further evaluation/management. Ischemic evaluation without new disease and VT ablation without substrate to complete ablation.   Transferred from [facility] for further management and evaluation for LVAD vs OHT.    (26 May 2022 20:31)    24 hr events: no acute events     REVIEW OF SYSTEMS:    CONSTITUTIONAL: No weakness, fevers or chills  EYES/ENT: No visual changes;  No vertigo or throat pain   NECK: No pain or stiffness  RESPIRATORY: No cough, wheezing, hemoptysis; No shortness of breath  CARDIOVASCULAR: No chest pain or palpitations  GASTROINTESTINAL: No abdominal or epigastric pain. No nausea, vomiting, or hematemesis; No diarrhea or constipation. No melena or hematochezia.  GENITOURINARY: No dysuria, frequency or hematuria  NEUROLOGICAL: No numbness or weakness  SKIN: No itching, rashes      Physical Exam:  Vital Signs Last 24 Hrs  T(C): 37 (06 Jun 2022 15:00), Max: 37 (06 Jun 2022 15:00)  T(F): 98.6 (06 Jun 2022 15:00), Max: 98.6 (06 Jun 2022 15:00)  HR: 80 (06 Jun 2022 18:00) (72 - 80)  BP: 110/77 (06 Jun 2022 14:45) (103/72 - 110/77)  BP(mean): 89 (06 Jun 2022 14:45) (89 - 89)  RR: 18 (06 Jun 2022 17:00) (17 - 21)  SpO2: 95% (06 Jun 2022 17:00) (94% - 98%)      ============================I/O===========================   I&O's Detail    05 Jun 2022 07:01  -  06 Jun 2022 07:00  --------------------------------------------------------  IN:    IV PiggyBack: 100 mL    Milrinone: 220 mL    Oral Fluid: 554 mL  Total IN: 874 mL    OUT:    Voided (mL): 1800 mL  Total OUT: 1800 mL    Total NET: -926 mL      06 Jun 2022 07:01  -  06 Jun 2022 19:14  --------------------------------------------------------  IN:    Heparin: 36 mL    IV PiggyBack: 50 mL    Milrinone: 33 mL    Oral Fluid: 240 mL  Total IN: 359 mL    OUT:    Voided (mL): 900 mL  Total OUT: 900 mL    Total NET: -541 mL        ============================ LABS =========================                        13.5   10.68 )-----------( 292      ( 06 Jun 2022 16:22 )             41.9     06-06    135  |  104  |  24<H>  ----------------------------<  111<H>  4.2   |  19<L>  |  1.35<H>    Ca    8.8      06 Jun 2022 16:22  Phos  2.8     06-06  Mg     1.9     06-06    TPro  6.1  /  Alb  3.0<L>  /  TBili  0.7  /  DBili  x   /  AST  29  /  ALT  22  /  AlkPhos  53  06-06    LIVER FUNCTIONS - ( 06 Jun 2022 16:22 )  Alb: 3.0 g/dL / Pro: 6.1 g/dL / ALK PHOS: 53 U/L / ALT: 22 U/L / AST: 29 U/L / GGT: x           PT/INR - ( 06 Jun 2022 16:22 )   PT: 12.9 sec;   INR: 1.12 ratio         PTT - ( 06 Jun 2022 16:22 )  PTT:29.6 sec      ======================Micro/Rad/Cardio=================  Culture: Reviewed   CXR: Reviewed  Echo:Reviewed  ======================================================  PAST MEDICAL & SURGICAL HISTORY:  AV block      Essential hypertension      Chronic HFrEF (heart failure with reduced ejection fraction)      Chronic kidney disease, unspecified CKD stage      CAD (coronary artery disease)      HLD (hyperlipidemia)      Type 2 diabetes mellitus      Artificial cardiac pacemaker        ====================ASSESSMENT ==============  65 y/o male w/ PMHx HTN, HLD, type 2 DM, chronic HFrEF (EF 25-30% by Echo), complete heart block s/p PPM (in 2006, upgraded to BiV-ICD in 09/2016), CAD (s/p recent BERNABE mid LAD at [facility] on 04/18/2022), and stage II CKD (baseline Cr ~1.3) presented with near syncope to OSH found to have 41 episodes of VT on device, s/p unsuccessful VT ablation and transferred to [facility] for management of cardiogenic shock and advanced therapy eval.       Plan:  ====================== NEUROLOGY=====================  - AAOx3  - No active issues    acetaminophen     Tablet .. 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 low diastolic pulmonary gradient  - bedside nipride study done 5/29 with reduction in PVR to <2, still with elevated PA pressures   - SPO2 94-96% on room air     ====================CARDIOVASCULAR==================  Cardiogenic shock   - TTE 5/21: LV 5.2 cm, LVEF 10-15%, LVOT VTI 10 cm, moderate RV dysfunction, mild AI, minimal MR  - LHC 5/23: patent mLAD stent with slow flow, D1 with 40-50% stenosis  - RHC 5/26: RA 12, PA 70/40 (50), PCWP 22, Milana CO/CI 2.3/1.3, MAP 83 with SVR 2469, PVR 12 PERSAUD  - 5/27: RA 10, PA 76/35 (49), PCWP 34, PA sat 57% with Milana CO/CI 3.1/1.6, MAP 71 with SVR 1574, PVR 4.8  - 6/6 RHC: CVP 17, RV 75/4, PA 80/36, PCW 22, CI 1.88. IABP placed and pt transferred to CICU.   - Maintain Milrinone .5mcg/kg/min   - Cont hydral, ISD and toprol   - increase afterload as tolerated  - monitor strict IOs and daily wts   - repeat MVO2 and SCr, if SCR sdowntrending with IABP in place will proceed with listing for OHT  - maintain IABP on 1:1, daily CXRs to assess placement   - monitor hemodynamics via swan and perfusion indices   - HF following     VTach   - s/p EPS on 5/24, unable to perform ablation as no substrate found and did not induce VT because of severely low EF   - Interrogation of ICD @Dr Wilson's office 5/20: back-to-back episodes of VT @ 200+ bpm all terminating with ATP. Since last cath pt has had 41 VT episodes (all falling into VF zone)  - Normal device function. BIV pacing 97%. No programming changes made  -. c/w Amiodarone  - pending OHT listing   - no further VT on inotropes     CAD   - Chest pain free and compliant with DAPT since last PCI 4/18/22  - Cardiac cath @[facility] 4/18/22: mLAD 90% s/p BERNABE, LCx mild disease, RCA mild disease s/p diagnostic cardiac cath w/ Dr Wagner on 05/23/2022   - Cont ASA and Lipitor   - per discussion with interventional at OSH, ok to DC plavix, last dose 5/28    aMIOdarone    Tablet 200 milliGRAM(s) Oral daily  hydrALAZINE 37.5 milliGRAM(s) Oral every 8 hours  isosorbide   dinitrate Tablet (ISORDIL) 10 milliGRAM(s) Oral three times a day  metoprolol succinate ER 25 milliGRAM(s) Oral daily  milrinone Infusion 0.5 MICROgram(s)/kG/Min (11 mL/Hr) IV Continuous <Continuous>    ===================HEMATOLOGIC/ONC ===================  - No active issues  - start heparin gtt with PTT goal 50-60 for IABP  - H/H acceptable     aspirin enteric coated 81 milliGRAM(s) Oral daily  heparin  Infusion 1200 Unit(s)/Hr (12 mL/Hr) IV Continuous <Continuous>    ===================== RENAL =========================  JAGRUTI on CKD II  Admitted w/ Cr 2.01 (baseline Cr ~1.3) was downtrending with inotropic support, 6/5 started increasing again to 1.64  - likely i/s/o cardiogenic shock given RHC findings and CI of 1.88  - Avoid nephrotoxic agents, NSAIDs. Renally dose meds.  - monitor strict IOs and continue current cardiac support, repat SCr 4PM post IABP insertion     ==================== GASTROINTESTINAL===================  - No active issues  - Cont diabetic diet   - Cont. PPI and bowel regimen, last BM 6/6  - s/p colonoscopy 6/3 with 5 polyps removed and biopsied, benign findings      pantoprazole    Tablet 40 milliGRAM(s) Oral before breakfast  polyethylene glycol 3350 17 Gram(s) Oral two times a day  senna 2 Tablet(s) Oral at bedtime    =======================    ENDOCRINE  =====================  T2DM (A1C 6.2 on admission)  - Cont. ISS, glucose controlled, monitor FS closely     allopurinol 100 milliGRAM(s) Oral daily  atorvastatin 80 milliGRAM(s) Oral at bedtime  insulin lispro (ADMELOG) corrective regimen sliding scale   SubCutaneous at bedtime    ========================INFECTIOUS DISEASE================  RUE Phlebitis   Afebrile, no leukocytosis   - RUE US Duplex showing superficial thrombus  cont ancef 1g q8    ceFAZolin   IVPB 1000 milliGRAM(s) IV Intermittent every 8 hours      Lines  - RIJ [place] 6/6  - RF IABP 6/6        Patient requires continuous monitoring with bedside rhythm monitoring, pulse ox monitoring, and intermittent blood gas analysis. Care plan discussed with ICU care team. Patient remained critical and at risk for life threatening decompensation.  Patient seen, examined and plan discussed with CCU team during rounds.     I have personally provided 35 minutes of critical care time excluding time spent on separate procedures, in addition to initial critical care time provided by the CICU Attending, Dr. Leonardo

## 2022-06-06 NOTE — PROGRESS NOTE ADULT - PROBLEM SELECTOR PLAN 5
- hx of VT s/p unsuccessful VT ablation  - continue on amio 200 mg PO QD  - since being admitted to [facility] has not had any episodes of VT, currently tolerating high dose milrinone.

## 2022-06-06 NOTE — PROGRESS NOTE ADULT - SUBJECTIVE AND OBJECTIVE BOX
INFECTIOUS DISEASES FOLLOW UP-- Arabella Escalante  [phone]    This is a follow up note for this  66yMale with  Cardiomyopathy        ROS:  CONSTITUTIONAL:  No fever, good appetite  CARDIOVASCULAR:  No chest pain or palpitations  RESPIRATORY:  No dyspnea  GASTROINTESTINAL:  No nausea, vomiting, diarrhea, or abdominal pain  GENITOURINARY:  No dysuria  NEUROLOGIC:  No headache,     Allergies    No Known Allergies    Intolerances        ANTIBIOTICS/RELEVANT:  antimicrobials  ceFAZolin   IVPB 1000 milliGRAM(s) IV Intermittent every 8 hours    immunologic:  influenza   Vaccine 0.5 milliLiter(s) IntraMuscular once    OTHER:  acetaminophen     Tablet .. 650 milliGRAM(s) Oral every 6 hours PRN  allopurinol 100 milliGRAM(s) Oral daily  aMIOdarone    Tablet 200 milliGRAM(s) Oral daily  aspirin enteric coated 81 milliGRAM(s) Oral daily  atorvastatin 80 milliGRAM(s) Oral at bedtime  chlorhexidine 2% Cloths 1 Application(s) Topical daily  chlorhexidine 4% Liquid 1 Application(s) Topical <User Schedule>  heparin  Infusion 1200 Unit(s)/Hr IV Continuous <Continuous>  hydrALAZINE 37.5 milliGRAM(s) Oral every 8 hours  insulin lispro (ADMELOG) corrective regimen sliding scale   SubCutaneous at bedtime  isosorbide   dinitrate Tablet (ISORDIL) 10 milliGRAM(s) Oral three times a day  lidocaine   4% Patch 1 Patch Transdermal daily  metoprolol succinate ER 25 milliGRAM(s) Oral daily  milrinone Infusion 0.5 MICROgram(s)/kG/Min IV Continuous <Continuous>  pantoprazole    Tablet 40 milliGRAM(s) Oral before breakfast  polyethylene glycol 3350 17 Gram(s) Oral two times a day  senna 2 Tablet(s) Oral at bedtime      Objective:  Vital Signs Last 24 Hrs  T(C): 37 (06 Jun 2022 15:00), Max: 37 (06 Jun 2022 15:00)  T(F): 98.6 (06 Jun 2022 15:00), Max: 98.6 (06 Jun 2022 15:00)  HR: 80 (06 Jun 2022 18:00) (72 - 80)  BP: 110/77 (06 Jun 2022 14:45) (103/72 - 110/77)  BP(mean): 89 (06 Jun 2022 14:45) (89 - 89)  RR: 18 (06 Jun 2022 17:00) (17 - 21)  SpO2: 95% (06 Jun 2022 17:00) (94% - 98%)    PHYSICAL EXAM:  Constitutional:no acute distress  Eyes:JOHNY, EOMI  Ear/Nose/Throat: no oral lesions, Braydon CVC in place RIJ	  Respiratory: clear BL  Cardiovascular: S1S2  Gastrointestinal:soft, (+) BS, no tenderness  Extremities:no e/e/c RUE thrombophlebitis improved  IABP placed right femoral area  No Lymphadenopathy  IV sites not inflammed.    LABS:                        13.5   10.68 )-----------( 292      ( 06 Jun 2022 16:22 )             41.9     06-06    135  |  104  |  24<H>  ----------------------------<  111<H>  4.2   |  19<L>  |  1.35<H>    Ca    8.8      06 Jun 2022 16:22  Phos  2.8     06-06  Mg     1.9     06-06    TPro  6.1  /  Alb  3.0<L>  /  TBili  0.7  /  DBili  x   /  AST  29  /  ALT  22  /  AlkPhos  53  06-06    PT/INR - ( 06 Jun 2022 16:22 )   PT: 12.9 sec;   INR: 1.12 ratio         PTT - ( 06 Jun 2022 16:22 )  PTT:29.6 sec      MICROBIOLOGY:            RECENT CULTURES:  06-02 @ 15:54  .Blood Blood-Peripheral  --  --  --    No growth to date.  --  06-02 @ 15:40  .Blood Blood-Peripheral  --  --  --    No growth to date.  --      RADIOLOGY & ADDITIONAL STUDIES:  < from: [facility] Duplex Upper Ext Vein Scan, Right (06.03.22 @ 11:56) >  IMPRESSION:    No evidence of right upper extremity deep venous thrombosis.    Occlusive thrombuswithin the right cephalic and basilic vein   (superficial veins) at the level of forearm.    Incidental monophasic waveform within the left subclavian vein, which can   be seen in the setting of proximal obstruction.    < end of copied text >

## 2022-06-06 NOTE — PROGRESS NOTE ADULT - SUBJECTIVE AND OBJECTIVE BOX
Subjective:    Medications:  acetaminophen     Tablet .. 650 milliGRAM(s) Oral every 6 hours PRN  allopurinol 100 milliGRAM(s) Oral daily  aMIOdarone    Tablet 200 milliGRAM(s) Oral daily  aspirin enteric coated 81 milliGRAM(s) Oral daily  atorvastatin 80 milliGRAM(s) Oral at bedtime  ceFAZolin   IVPB 1000 milliGRAM(s) IV Intermittent every 8 hours  chlorhexidine 4% Liquid 1 Application(s) Topical <User Schedule>  heparin   Injectable 5000 Unit(s) SubCutaneous every 8 hours  hydrALAZINE 25 milliGRAM(s) Oral every 8 hours  influenza   Vaccine 0.5 milliLiter(s) IntraMuscular once  insulin lispro (ADMELOG) corrective regimen sliding scale   SubCutaneous at bedtime  lidocaine   4% Patch 1 Patch Transdermal daily  metoprolol succinate ER 25 milliGRAM(s) Oral daily  milrinone Infusion 0.5 MICROgram(s)/kG/Min IV Continuous <Continuous>  pantoprazole    Tablet 40 milliGRAM(s) Oral before breakfast  polyethylene glycol 3350 17 Gram(s) Oral two times a day  senna 2 Tablet(s) Oral at bedtime  sodium chloride 0.9%. 500 milliLiter(s) IV Continuous <Continuous>    Vitals:  T(C): 36.8 (22 @ 04:30), Max: 36.9 (22 @ 13:28)  HR: 72 (22 @ 04:30) (72 - 80)  BP: 104/74 (22 @ 04:30) (103/70 - 104/74)  BP(mean): --  RR: 18 (22 @ 04:30) (18 - 18)  SpO2: 98% (22 @ 04:30) (95% - 98%)    Daily Height in cm: 177.8 (2022 01:47)    Daily Weight in k.5 (2022 07:33)  Weight (kg): 72.9 (22 @ 01:47)    I&O's Summary    2022 07:01  -  2022 07:00  --------------------------------------------------------  IN: 874 mL / OUT: 1800 mL / NET: -926 mL        Physical Exam:  Appearance: No Acute Distress  HEENT: JVP ___ cm H2O, no HJR  Cardiovascular: RRR, Normal S1 S2, No murmurs/rubs/gallops  Respiratory: Clear to auscultation bilaterally  Gastrointestinal: soft, non-tender, non-distended	  Skin: no skin lesions  Neurologic: Non-focal  Extremities: No LE edema, warm and well perfused  Psychiatry: A & O x 3, Mood & affect appropriate      Labs:                        13.5   12.01 )-----------( 336      ( 2022 06:48 )             41.0     06-06    134<L>  |  103  |  27<H>  ----------------------------<  108<H>  4.5   |  20<L>  |  1.64<H>    Ca    9.1      2022 06:50        PT/INR - ( 2022 17:34 )   PT: 12.5 sec;   INR: 1.08 ratio                        Subjective: Patient feeling well, no complaints. He is urinating well.     Medications:  acetaminophen     Tablet .. 650 milliGRAM(s) Oral every 6 hours PRN  allopurinol 100 milliGRAM(s) Oral daily  aMIOdarone    Tablet 200 milliGRAM(s) Oral daily  aspirin enteric coated 81 milliGRAM(s) Oral daily  atorvastatin 80 milliGRAM(s) Oral at bedtime  ceFAZolin   IVPB 1000 milliGRAM(s) IV Intermittent every 8 hours  chlorhexidine 4% Liquid 1 Application(s) Topical <User Schedule>  heparin   Injectable 5000 Unit(s) SubCutaneous every 8 hours  hydrALAZINE 25 milliGRAM(s) Oral every 8 hours  influenza   Vaccine 0.5 milliLiter(s) IntraMuscular once  insulin lispro (ADMELOG) corrective regimen sliding scale   SubCutaneous at bedtime  lidocaine   4% Patch 1 Patch Transdermal daily  metoprolol succinate ER 25 milliGRAM(s) Oral daily  milrinone Infusion 0.5 MICROgram(s)/kG/Min IV Continuous <Continuous>  pantoprazole    Tablet 40 milliGRAM(s) Oral before breakfast  polyethylene glycol 3350 17 Gram(s) Oral two times a day  senna 2 Tablet(s) Oral at bedtime  sodium chloride 0.9%. 500 milliLiter(s) IV Continuous <Continuous>    Vitals:  T(C): 36.8 (22 @ 04:30), Max: 36.9 (22 @ 13:28)  HR: 72 (22 @ 04:30) (72 - 80)  BP: 104/74 (22 @ 04:30) (103/70 - 104/74)  BP(mean): --  RR: 18 (22 @ 04:30) (18 - 18)  SpO2: 98% (22 @ 04:30) (95% - 98%)    Daily Height in cm: 177.8 (2022 01:47)    Daily Weight in k.5 (2022 07:33)  Weight (kg): 72.9 (22 @ 01:47)    I&O's Summary    2022 07:01  -  2022 07:00  --------------------------------------------------------  IN: 874 mL / OUT: 1800 mL / NET: -926 mL        Physical Exam:  Appearance: No Acute Distress  HEENT: JVP 9 cm H2O, no HJR  Cardiovascular: RRR, Normal S1 S2, No murmurs/rubs/gallops  Respiratory: Clear to auscultation bilaterally  Gastrointestinal: soft, non-tender, non-distended	  Skin: no skin lesions  Neurologic: Non-focal  Extremities: No LE edema, warm and well perfused  Psychiatry: A & O x 3, Mood & affect appropriate      Labs:                        13.5   12.01 )-----------( 336      ( 2022 06:48 )             41.0     06-06    134<L>  |  103  |  27<H>  ----------------------------<  108<H>  4.5   |  20<L>  |  1.64<H>    Ca    9.1      2022 06:50        PT/INR - ( 2022 17:34 )   PT: 12.5 sec;   INR: 1.08 ratio

## 2022-06-06 NOTE — PROGRESS NOTE ADULT - SUBJECTIVE AND OBJECTIVE BOX
Chasity Abarca MD  Division of Hospital Medicine  Pager [phone], If no response/off hours [phone]    Patient is a 66y old  Male who presents with a chief complaint of LVAD/OHT eval (06 Jun 2022 08:33)        SUBJECTIVE / OVERNIGHT EVENTS:  overnight no events  no n/v/f/chills, cp, sob, abd pain  aware of plan for RHC and IABP, and tx to CICU for monitoring today    CAPILLARY BLOOD GLUCOSE      POCT Blood Glucose.: 107 mg/dL (06 Jun 2022 05:51)  POCT Blood Glucose.: 129 mg/dL (05 Jun 2022 21:44)  POCT Blood Glucose.: 113 mg/dL (05 Jun 2022 17:19)    I&O's Summary    05 Jun 2022 07:01  -  06 Jun 2022 07:00  --------------------------------------------------------  IN: 874 mL / OUT: 1800 mL / NET: -926 mL    06 Jun 2022 07:01  -  06 Jun 2022 14:50  --------------------------------------------------------  IN: 240 mL / OUT: 500 mL / NET: -260 mL      Vital Signs Last 24 Hrs  T(C): 36.9 (06 Jun 2022 12:15), Max: 36.9 (06 Jun 2022 12:15)  T(F): 98.4 (06 Jun 2022 12:15), Max: 98.4 (06 Jun 2022 12:15)  HR: 80 (06 Jun 2022 12:15) (72 - 80)  BP: 108/76 (06 Jun 2022 12:15) (103/72 - 108/76)  BP(mean): --  RR: 18 (06 Jun 2022 12:15) (18 - 18)  SpO2: 98% (06 Jun 2022 12:15) (96% - 98%)    PHYSICAL EXAM:  GENERAL:  Well appearing, in NAD  HEAD:  NCAT  EYES: PERRLA, conjunctiva clear  NECK: Supple, No JVD  CHEST/LUNG: CTA B/L.  HEART: Reg rate. Normal S1, S2.   ABDOMEN: SNTND.  EXTREMITIES:  2+ Peripheral Pulses, No clubbing, cyanosis, edema.  PSYCH: appropriate affect  SKIN: No rashes or lesions    LABS:                        13.5   12.01 )-----------( 336      ( 06 Jun 2022 06:48 )             41.0     06-06    134<L>  |  103  |  27<H>  ----------------------------<  108<H>  4.5   |  20<L>  |  1.64<H>    Ca    9.1      06 Jun 2022 06:50      PT/INR - ( 05 Jun 2022 17:34 )   PT: 12.5 sec;   INR: 1.08 ratio         RADIOLOGY & ADDITIONAL TESTS:  Consultant(s) Notes Reviewed:  CHF  Care Discussed with Consultants/Other Providers: Floor NP/PA    MEDICATIONS  (STANDING):  allopurinol 100 milliGRAM(s) Oral daily  aMIOdarone    Tablet 200 milliGRAM(s) Oral daily  aspirin enteric coated 81 milliGRAM(s) Oral daily  atorvastatin 80 milliGRAM(s) Oral at bedtime  ceFAZolin   IVPB 1000 milliGRAM(s) IV Intermittent every 8 hours  chlorhexidine 2% Cloths 1 Application(s) Topical daily  chlorhexidine 4% Liquid 1 Application(s) Topical <User Schedule>  heparin  Infusion 1200 Unit(s)/Hr (12 mL/Hr) IV Continuous <Continuous>  hydrALAZINE 25 milliGRAM(s) Oral every 8 hours  influenza   Vaccine 0.5 milliLiter(s) IntraMuscular once  insulin lispro (ADMELOG) corrective regimen sliding scale   SubCutaneous at bedtime  lidocaine   4% Patch 1 Patch Transdermal daily  metoprolol succinate ER 25 milliGRAM(s) Oral daily  milrinone Infusion 0.5 MICROgram(s)/kG/Min (11 mL/Hr) IV Continuous <Continuous>  pantoprazole    Tablet 40 milliGRAM(s) Oral before breakfast  polyethylene glycol 3350 17 Gram(s) Oral two times a day  senna 2 Tablet(s) Oral at bedtime    MEDICATIONS  (PRN):  acetaminophen     Tablet .. 650 milliGRAM(s) Oral every 6 hours PRN Temp greater or equal to 38C (100.4F), Mild Pain (1 - 3)

## 2022-06-06 NOTE — PROGRESS NOTE ADULT - PROBLEM SELECTOR PLAN 1
- C/w milrinone to 0.5 mcg/kg/min  - CI/CO improved after AV pacing rate increased to 80  - continue with Toprol XL 25 mg PO QD   - Continue Hydralazine to 25 mg TID   - PAC/cordis discontinued 05/29 given fever   - Pending advanced therapies eval for LVAD/transplant (ABO A, PRA 0%), s/p colonoscopy on 6/2. At this time had 5 polyps removed, awaiting results of biopsy. Holding off on RHC and IABP insertion, will plan to undergo procedure on Monday 6/6 - C/w milrinone to 0.5 mcg/kg/min  - CI/CO improved after AV pacing rate increased to 80  - continue with Toprol XL 25 mg PO QD   - Continue Hydralazine to 25 mg TID   - PAC/cordis discontinued 05/29 given fever   - Pending advanced therapies eval for LVAD/transplant (ABO A, PRA 0%), s/p colonoscopy on 6/2. At this time had 5 polyps removed, biopsy showing benign findings  -RHC and IABP insertion, today

## 2022-06-06 NOTE — PROGRESS NOTE ADULT - SUBJECTIVE AND OBJECTIVE BOX
Behavioral Cardiology Progress Note/Cognitive Screen     History of present illness: Mr. Du is a 66-year-old male, PMH of HTN, HLD, type 2 DM, chronic HFrEF (25-30%), complete heart block s/p PPM, BiV-ICD, CAD (s/p recent BERNABE mid LAD at [facility] on 2022), and stage II CKD, admitted for intermittent vtach, now s/p unsuccessful VT ablation. Patient transferred from [facility] to [facility] for further management and evaluation for LVAD vs. OHT.     Social history: Patient lives with his wife (Teresa, 66 y/o, [phone]) and 26 y/o daughter (Priyanka) in [place], [place], in private home. Patient has two other adult sons (Philip, age 35; and Aldo, age 30) who live in [place]. Patient’s mother  in her 80s of natural causes and father  in his 80s from a heart attack. Patient has four siblings who live in the area and who he is close to. Patient is a citizen, moved [place] the [place] from [facility] approximately 40 years ago. 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 Education: high school.    Substance use:   Tobacco: Former smoker, approximately 1-2 cigarettes/day from age 21 until 2006 at time of his heart block. No use since that time.    Alcohol: Denies current alcohol use. Last time he used alcohol was prior to 2021. Before that time, he used to drink approximately 4 beers at a time, 2-3 times/week.   Drug: Denies current or past substance use.        Past psychiatric history: Denied.      Psychological assessment:     Mental Status Exam: Seen sitting in chair on 2 DSU. Alert, oriented x4. Appropriate eye contact. Speech normal volume and rate. Thought process was logical and goal-oriented, content appropriate to conversation. No evidence of jack, delusions, psychosis. Denies SI/HI. Mood is euthymic. Affect is full range. Insight into disease is adequate. Immediate judgment good.      Dx: Adjustment disorder, unspecified. F43.20  Systolic heart failure, I50.2    Recommendations:   Behavioral Cardiology will follow as needed.    service  Would benefit from repeat memory assessment  Needs additional education on heart failure guidelines, as well as LVAD and HT  Would benefit from involving family members in communication regarding medical status and recommendations  Patient would benefit from continued involvement from behavioral cardiology      30 minutes spent on patient encounter       Behavioral Cardiology Progress Note/Cognitive Screen     History of present illness: Mr. Du is a 66-year-old male, PMH of HTN, HLD, type 2 DM, chronic HFrEF (25-30%), complete heart block s/p PPM, BiV-ICD, CAD (s/p recent BERNABE mid LAD at [facility] on 2022), and stage II CKD, admitted for intermittent vtach, now s/p unsuccessful VT ablation. Patient transferred from [facility] to [facility] for further management and evaluation for LVAD vs. OHT.     Social history: Patient lives with his wife (Teresa, 68 y/o, [phone]) and 26 y/o daughter (Priyanka) in [place], [place], in private home. Patient has two other adult sons (Philip, age 35; and Aldo, age 30) who live in [place]. Patient’s mother  in her 80s of natural causes and father  in his 80s from a heart attack. Patient has four siblings who live in the area and who he is close to. Patient is a citizen, [place] [facility] approximately 40 years ago. 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 Education: high school.    Substance use:   Tobacco: Former smoker, approximately 1-2 cigarettes/day from age 21 until 2006 at time of his heart block. No use since that time.    Alcohol: Denies current alcohol use. Last time he used alcohol was prior to 2021. Before that time, he used to drink approximately 4 beers at a time, 2-3 times/week.   Drug: Denies current or past substance use.

## 2022-06-06 NOTE — PROGRESS NOTE ADULT - PROBLEM SELECTOR PLAN 6
- upon arrival was noted to have JAGRUTI, likely related to low cardiac output and is now improving - upon arrival was noted to have JAGRUTI, likely related to low cardiac output and is now slightly worse. Will do RHC and IABP today

## 2022-06-06 NOTE — PROGRESS NOTE ADULT - ASSESSMENT
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 LHC did not reveal new obstructive CAD and her underwent EPS which did not reveal endocardial substrate amenable for ablation. RHC revealed severely depressed cardiac output and he was transferred to [facility] 5/26 for advanced therapies evaluation.    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 He has improved significantly with sequential uptitration of inotropes and increased pacing rate. He is INTERMACS 3 and SCAI stage B. He will likely need advanced therapies this admission. He has significant pulmonary hypertension but PVR is reversible with nipride and suspect would improve with LV unloading given severely elevated PCWP despite euvolemia and low diastolic pulmonary gradient. Pending results the results from his polypectomy will determine if patient can be listed for OHT.     Review of studies  TTE 5/21: LV 5.2 cm, LVEF 10-15%, LVOT VTI 10 cm, moderate RV dysfunction, mild AI, minimal MR  EKG: a-BiV paced  [place] 5/23: patent mLAD stent with slow flow, D1 with 40-50% stenosis, mild disease otherwise  [facility] 5/26: RA 12, PA 70/40 (50), PCWP 22, Milana CO/CI 2.3/1.3, MAP 83 with SVR 2469, PVR 12 PERSAUD    Hemodynamics  5/27 (milrinone 0.25): RA 10, PA 76/35 (49), PCWP 34, PA sat 57% with Milana CO/CI 3.1/1.6, MAP 71 with SVR 1574, PVR 4.8  5/28 (on milrinone 0.375): RA 7, PA 63/31, PCWP 26, PA sat 72.8% with Milana CO/CI 4.9/2.5 (CI later dropped to 1.6 in the afternoon), MAP 70 with SVR 1039, PVR 2.9  5/29 (on milrinone 0.5): RA 8, PA 75/32 (50), PCWP 28, PA sat 74% with Milana CO/CI 5.0/2.6, MAP 77 with SVR 1200, PVR 4.4  5/29 (on milrinone 0.5 and nipride to 3 mcg/kg/min): RA 6, PA 59/31 (40), PCWP 30, PA sat 79% with Milana CO/CI 7.0/3.6, BP 97/57 (MAP 70) with , PVR 1.4

## 2022-06-06 NOTE — CHART NOTE - NSCHARTNOTEFT_GEN_A_CORE
HEMATOLOGY FELLOW NOTE    Patient is s/p RHC+IABP on 6/6. H/H normal. EPO level elevated, suggestive of secondary polycythemia. MELINA 2 pending, however concern for primary PCV is low.     We will follow peripherally and follow-up results of JAK2 mutation.     Please page or call with questions.     Valarie Moseley MD  Hematology/Oncology Fellow, PGY-5  Pager: [phone]  After 5pm and on weekends please page on-call fellow

## 2022-06-06 NOTE — PROGRESS NOTE ADULT - SUBJECTIVE AND OBJECTIVE BOX
Interval Events:   No acute events  Colonoscopy biopsy results back     Hospital Medications:  acetaminophen     Tablet .. 650 milliGRAM(s) Oral every 6 hours PRN  allopurinol 100 milliGRAM(s) Oral daily  aMIOdarone    Tablet 200 milliGRAM(s) Oral daily  aspirin enteric coated 81 milliGRAM(s) Oral daily  atorvastatin 80 milliGRAM(s) Oral at bedtime  ceFAZolin   IVPB 1000 milliGRAM(s) IV Intermittent every 8 hours  chlorhexidine 2% Cloths 1 Application(s) Topical daily  chlorhexidine 4% Liquid 1 Application(s) Topical <User Schedule>  heparin  Infusion 1200 Unit(s)/Hr IV Continuous <Continuous>  hydrALAZINE 25 milliGRAM(s) Oral every 8 hours  influenza   Vaccine 0.5 milliLiter(s) IntraMuscular once  insulin lispro (ADMELOG) corrective regimen sliding scale   SubCutaneous at bedtime  lidocaine   4% Patch 1 Patch Transdermal daily  metoprolol succinate ER 25 milliGRAM(s) Oral daily  milrinone Infusion 0.5 MICROgram(s)/kG/Min IV Continuous <Continuous>  pantoprazole    Tablet 40 milliGRAM(s) Oral before breakfast  polyethylene glycol 3350 17 Gram(s) Oral two times a day  senna 2 Tablet(s) Oral at bedtime      ROS: All system reviewed and negative except as mentioned above.    PHYSICAL EXAM:   Vital Signs:  Vital Signs Last 24 Hrs  T(C): 37 (2022 15:00), Max: 37 (2022 15:00)  T(F): 98.6 (2022 15:00), Max: 98.6 (2022 15:00)  HR: 80 (2022 15:58) (72 - 80)  BP: 110/77 (2022 14:45) (103/72 - 110/77)  BP(mean): 89 (2022 14:45) (89 - 89)  RR: 17 (2022 15:58) (17 - 21)  SpO2: 96% (2022 15:58) (94% - 98%)  Daily Height in cm: 177.8 (2022 01:47)    Daily Weight in k.5 (2022 07:33)    GENERAL:  NAD, Appears stated age  HEENT:  NC/AT,  conjunctivae clear and pink, sclera -anicteric  CHEST:  Normal Effort, Breath sounds clear  HEART:  RRR, S1 + S2, no murmurs  ABDOMEN:  Soft, non-tender, non-distended, normoactive bowel sounds,  no masses  EXTREMITIES:  no cyanosis or edema  SKIN:  Warm & Dry. No rash or erythema  NEURO:  Alert, oriented, no focal deficit    LABS:                        13.5   12.01 )-----------( 336      ( 2022 06:48 )             41.0     Mean Cell Volume: 91.1 fl (22 @ 06:48)        134<L>  |  103  |  27<H>  ----------------------------<  108<H>  4.5   |  20<L>  |  1.64<H>    Ca    9.1      2022 06:50        PT/INR - ( 2022 17:34 )   PT: 12.5 sec;   INR: 1.08 ratio                                     13.5   12.01 )-----------( 336      ( 2022 06:48 )             41.0                         14.5   13.27 )-----------( 320      ( 2022 17:34 )             44.8       Imaging: Images reviewed.

## 2022-06-07 DIAGNOSIS — R10.9 UNSPECIFIED ABDOMINAL PAIN: ICD-10-CM

## 2022-06-07 LAB
ALBUMIN SERPL ELPH-MCNC: 3.4 G/DL — SIGNIFICANT CHANGE UP (ref 3.3–5)
ALP SERPL-CCNC: 55 U/L — SIGNIFICANT CHANGE UP (ref 40–120)
ALT FLD-CCNC: 28 U/L — SIGNIFICANT CHANGE UP (ref 10–45)
ANION GAP SERPL CALC-SCNC: 9 MMOL/L — SIGNIFICANT CHANGE UP (ref 5–17)
APTT BLD: 45.1 SEC — HIGH (ref 27.5–35.5)
APTT BLD: 54.2 SEC — HIGH (ref 27.5–35.5)
AST SERPL-CCNC: 28 U/L — SIGNIFICANT CHANGE UP (ref 10–40)
BASE EXCESS BLDMV CALC-SCNC: -0.2 MMOL/L — SIGNIFICANT CHANGE UP (ref -3–3)
BASE EXCESS BLDMV CALC-SCNC: -1.5 MMOL/L — SIGNIFICANT CHANGE UP (ref -3–3)
BASOPHILS # BLD AUTO: 0.04 K/UL — SIGNIFICANT CHANGE UP (ref 0–0.2)
BASOPHILS NFR BLD AUTO: 0.3 % — SIGNIFICANT CHANGE UP (ref 0–2)
BILIRUB SERPL-MCNC: 0.6 MG/DL — SIGNIFICANT CHANGE UP (ref 0.2–1.2)
BLD GP AB SCN SERPL QL: NEGATIVE — SIGNIFICANT CHANGE UP
BUN SERPL-MCNC: 22 MG/DL — SIGNIFICANT CHANGE UP (ref 7–23)
CALCIUM SERPL-MCNC: 9.1 MG/DL — SIGNIFICANT CHANGE UP (ref 8.4–10.5)
CHLORIDE SERPL-SCNC: 103 MMOL/L — SIGNIFICANT CHANGE UP (ref 96–108)
CO2 BLDMV-SCNC: 24 MMOL/L — SIGNIFICANT CHANGE UP (ref 21–29)
CO2 BLDMV-SCNC: 25 MMOL/L — SIGNIFICANT CHANGE UP (ref 21–29)
CO2 SERPL-SCNC: 21 MMOL/L — LOW (ref 22–31)
CREAT SERPL-MCNC: 1.21 MG/DL — SIGNIFICANT CHANGE UP (ref 0.5–1.3)
CULTURE RESULTS: SIGNIFICANT CHANGE UP
CULTURE RESULTS: SIGNIFICANT CHANGE UP
EGFR: 67 ML/MIN/1.73M2 — SIGNIFICANT CHANGE UP
EOSINOPHIL # BLD AUTO: 0.46 K/UL — SIGNIFICANT CHANGE UP (ref 0–0.5)
EOSINOPHIL NFR BLD AUTO: 3.5 % — SIGNIFICANT CHANGE UP (ref 0–6)
GAS PNL BLDMV: SIGNIFICANT CHANGE UP
GAS PNL BLDMV: SIGNIFICANT CHANGE UP
GLUCOSE BLDC GLUCOMTR-MCNC: 135 MG/DL — HIGH (ref 70–99)
GLUCOSE BLDC GLUCOMTR-MCNC: 136 MG/DL — HIGH (ref 70–99)
GLUCOSE SERPL-MCNC: 149 MG/DL — HIGH (ref 70–99)
H CAPSUL MYC AB FLD-ACNC: SIGNIFICANT CHANGE UP
H CAPSUL YST AB FLD-ACNC: SIGNIFICANT CHANGE UP
HCO3 BLDMV-SCNC: 23 MMOL/L — SIGNIFICANT CHANGE UP (ref 20–28)
HCO3 BLDMV-SCNC: 24 MMOL/L — SIGNIFICANT CHANGE UP (ref 20–28)
HCT VFR BLD CALC: 41.2 % — SIGNIFICANT CHANGE UP (ref 39–50)
HGB BLD-MCNC: 13.7 G/DL — SIGNIFICANT CHANGE UP (ref 13–17)
HOROWITZ INDEX BLDMV+IHG-RTO: 21 — SIGNIFICANT CHANGE UP
IMM GRANULOCYTES NFR BLD AUTO: 0.7 % — SIGNIFICANT CHANGE UP (ref 0–1.5)
INR BLD: 1.05 RATIO — SIGNIFICANT CHANGE UP (ref 0.88–1.16)
LACTATE SERPL-SCNC: 1 MMOL/L — SIGNIFICANT CHANGE UP (ref 0.7–2)
LYMPHOCYTES # BLD AUTO: 15.3 % — SIGNIFICANT CHANGE UP (ref 13–44)
LYMPHOCYTES # BLD AUTO: 2.02 K/UL — SIGNIFICANT CHANGE UP (ref 1–3.3)
MAGNESIUM SERPL-MCNC: 2.1 MG/DL — SIGNIFICANT CHANGE UP (ref 1.6–2.6)
MCHC RBC-ENTMCNC: 30.3 PG — SIGNIFICANT CHANGE UP (ref 27–34)
MCHC RBC-ENTMCNC: 33.3 GM/DL — SIGNIFICANT CHANGE UP (ref 32–36)
MCV RBC AUTO: 91.2 FL — SIGNIFICANT CHANGE UP (ref 80–100)
MONOCYTES # BLD AUTO: 1.37 K/UL — HIGH (ref 0–0.9)
MONOCYTES NFR BLD AUTO: 10.4 % — SIGNIFICANT CHANGE UP (ref 2–14)
NEUTROPHILS # BLD AUTO: 9.18 K/UL — HIGH (ref 1.8–7.4)
NEUTROPHILS NFR BLD AUTO: 69.8 % — SIGNIFICANT CHANGE UP (ref 43–77)
NRBC # BLD: 0 /100 WBCS — SIGNIFICANT CHANGE UP (ref 0–0)
O2 CT VFR BLD CALC: 37 MMHG — SIGNIFICANT CHANGE UP (ref 30–65)
O2 CT VFR BLD CALC: 43 MMHG — SIGNIFICANT CHANGE UP (ref 30–65)
PCO2 BLDMV: 37 MMHG — SIGNIFICANT CHANGE UP (ref 30–65)
PCO2 BLDMV: 37 MMHG — SIGNIFICANT CHANGE UP (ref 30–65)
PH BLDMV: 7.4 — SIGNIFICANT CHANGE UP (ref 7.32–7.45)
PH BLDMV: 7.42 — SIGNIFICANT CHANGE UP (ref 7.32–7.45)
PHOSPHATE SERPL-MCNC: 2.8 MG/DL — SIGNIFICANT CHANGE UP (ref 2.5–4.5)
PLATELET # BLD AUTO: 304 K/UL — SIGNIFICANT CHANGE UP (ref 150–400)
POTASSIUM SERPL-MCNC: 4.5 MMOL/L — SIGNIFICANT CHANGE UP (ref 3.5–5.3)
POTASSIUM SERPL-SCNC: 4.5 MMOL/L — SIGNIFICANT CHANGE UP (ref 3.5–5.3)
PROT SERPL-MCNC: 6.3 G/DL — SIGNIFICANT CHANGE UP (ref 6–8.3)
PROTHROM AB SERPL-ACNC: 12.2 SEC — SIGNIFICANT CHANGE UP (ref 10.5–13.4)
RBC # BLD: 4.52 M/UL — SIGNIFICANT CHANGE UP (ref 4.2–5.8)
RBC # FLD: 15.9 % — HIGH (ref 10.3–14.5)
RH IG SCN BLD-IMP: POSITIVE — SIGNIFICANT CHANGE UP
SAO2 % BLDMV: 65.7 — SIGNIFICANT CHANGE UP (ref 60–90)
SAO2 % BLDMV: 71.2 — SIGNIFICANT CHANGE UP (ref 60–90)
SODIUM SERPL-SCNC: 133 MMOL/L — LOW (ref 135–145)
SPECIMEN SOURCE: SIGNIFICANT CHANGE UP
SPECIMEN SOURCE: SIGNIFICANT CHANGE UP
WBC # BLD: 13.16 K/UL — HIGH (ref 3.8–10.5)
WBC # FLD AUTO: 13.16 K/UL — HIGH (ref 3.8–10.5)

## 2022-06-07 PROCEDURE — 93503 INSERT/PLACE HEART CATHETER: CPT

## 2022-06-07 PROCEDURE — 74018 RADEX ABDOMEN 1 VIEW: CPT | Mod: 26

## 2022-06-07 PROCEDURE — 99291 CRITICAL CARE FIRST HOUR: CPT

## 2022-06-07 PROCEDURE — 90791 PSYCH DIAGNOSTIC EVALUATION: CPT

## 2022-06-07 PROCEDURE — 71045 X-RAY EXAM CHEST 1 VIEW: CPT | Mod: 26,77

## 2022-06-07 PROCEDURE — 99292 CRITICAL CARE ADDL 30 MIN: CPT | Mod: 25

## 2022-06-07 PROCEDURE — 71045 X-RAY EXAM CHEST 1 VIEW: CPT | Mod: 26

## 2022-06-07 PROCEDURE — 99232 SBSQ HOSP IP/OBS MODERATE 35: CPT

## 2022-06-07 PROCEDURE — 99291 CRITICAL CARE FIRST HOUR: CPT | Mod: 25

## 2022-06-07 PROCEDURE — 93010 ELECTROCARDIOGRAM REPORT: CPT

## 2022-06-07 PROCEDURE — 90832 PSYTX W PT 30 MINUTES: CPT

## 2022-06-07 RX ORDER — HYDRALAZINE HCL 50 MG
100 TABLET ORAL EVERY 8 HOURS
Refills: 0 | Status: DISCONTINUED | OUTPATIENT
Start: 2022-06-07 | End: 2022-06-21

## 2022-06-07 RX ORDER — HYDRALAZINE HCL 50 MG
10 TABLET ORAL ONCE
Refills: 0 | Status: DISCONTINUED | OUTPATIENT
Start: 2022-06-07 | End: 2022-06-07

## 2022-06-07 RX ORDER — HYDRALAZINE HCL 50 MG
75 TABLET ORAL EVERY 8 HOURS
Refills: 0 | Status: DISCONTINUED | OUTPATIENT
Start: 2022-06-07 | End: 2022-06-07

## 2022-06-07 RX ORDER — SIMETHICONE 80 MG/1
80 TABLET, CHEWABLE ORAL EVERY 6 HOURS
Refills: 0 | Status: DISCONTINUED | OUTPATIENT
Start: 2022-06-07 | End: 2022-06-21

## 2022-06-07 RX ORDER — HYDRALAZINE HCL 50 MG
50 TABLET ORAL THREE TIMES A DAY
Refills: 0 | Status: DISCONTINUED | OUTPATIENT
Start: 2022-06-07 | End: 2022-06-07

## 2022-06-07 RX ORDER — ISOSORBIDE DINITRATE 5 MG/1
20 TABLET ORAL THREE TIMES A DAY
Refills: 0 | Status: DISCONTINUED | OUTPATIENT
Start: 2022-06-07 | End: 2022-06-08

## 2022-06-07 RX ORDER — SIMETHICONE 80 MG/1
80 TABLET, CHEWABLE ORAL ONCE
Refills: 0 | Status: COMPLETED | OUTPATIENT
Start: 2022-06-07 | End: 2022-06-07

## 2022-06-07 RX ORDER — INSULIN LISPRO 100/ML
VIAL (ML) SUBCUTANEOUS
Refills: 0 | Status: DISCONTINUED | OUTPATIENT
Start: 2022-06-07 | End: 2022-06-21

## 2022-06-07 RX ADMIN — LIDOCAINE 1 PATCH: 4 CREAM TOPICAL at 11:16

## 2022-06-07 RX ADMIN — Medication 75 MILLIGRAM(S): at 15:09

## 2022-06-07 RX ADMIN — MILRINONE LACTATE 11 MICROGRAM(S)/KG/MIN: 1 INJECTION, SOLUTION INTRAVENOUS at 09:37

## 2022-06-07 RX ADMIN — Medication 650 MILLIGRAM(S): at 22:12

## 2022-06-07 RX ADMIN — CHLORHEXIDINE GLUCONATE 1 APPLICATION(S): 213 SOLUTION TOPICAL at 07:38

## 2022-06-07 RX ADMIN — POLYETHYLENE GLYCOL 3350 17 GRAM(S): 17 POWDER, FOR SOLUTION ORAL at 05:55

## 2022-06-07 RX ADMIN — ATORVASTATIN CALCIUM 80 MILLIGRAM(S): 80 TABLET, FILM COATED ORAL at 21:15

## 2022-06-07 RX ADMIN — Medication 100 MILLIGRAM(S): at 00:25

## 2022-06-07 RX ADMIN — Medication 25 MILLIGRAM(S): at 05:56

## 2022-06-07 RX ADMIN — Medication 50 MILLIGRAM(S): at 05:55

## 2022-06-07 RX ADMIN — SIMETHICONE 80 MILLIGRAM(S): 80 TABLET, CHEWABLE ORAL at 02:52

## 2022-06-07 RX ADMIN — PANTOPRAZOLE SODIUM 40 MILLIGRAM(S): 20 TABLET, DELAYED RELEASE ORAL at 05:55

## 2022-06-07 RX ADMIN — ISOSORBIDE DINITRATE 20 MILLIGRAM(S): 5 TABLET ORAL at 15:54

## 2022-06-07 RX ADMIN — Medication 100 MILLIGRAM(S): at 11:14

## 2022-06-07 RX ADMIN — Medication 100 MILLIGRAM(S): at 21:15

## 2022-06-07 RX ADMIN — ISOSORBIDE DINITRATE 20 MILLIGRAM(S): 5 TABLET ORAL at 11:17

## 2022-06-07 RX ADMIN — LIDOCAINE 1 PATCH: 4 CREAM TOPICAL at 21:42

## 2022-06-07 RX ADMIN — AMIODARONE HYDROCHLORIDE 200 MILLIGRAM(S): 400 TABLET ORAL at 05:55

## 2022-06-07 RX ADMIN — LIDOCAINE 1 PATCH: 4 CREAM TOPICAL at 22:21

## 2022-06-07 RX ADMIN — HEPARIN SODIUM 11.5 UNIT(S)/HR: 5000 INJECTION INTRAVENOUS; SUBCUTANEOUS at 00:25

## 2022-06-07 RX ADMIN — Medication 100 MILLIGRAM(S): at 06:45

## 2022-06-07 RX ADMIN — Medication 650 MILLIGRAM(S): at 11:45

## 2022-06-07 RX ADMIN — Medication 100 MILLIGRAM(S): at 15:10

## 2022-06-07 RX ADMIN — ISOSORBIDE DINITRATE 10 MILLIGRAM(S): 5 TABLET ORAL at 05:56

## 2022-06-07 RX ADMIN — MILRINONE LACTATE 11 MICROGRAM(S)/KG/MIN: 1 INJECTION, SOLUTION INTRAVENOUS at 00:24

## 2022-06-07 RX ADMIN — HEPARIN SODIUM 12.5 UNIT(S)/HR: 5000 INJECTION INTRAVENOUS; SUBCUTANEOUS at 09:37

## 2022-06-07 RX ADMIN — Medication 81 MILLIGRAM(S): at 11:14

## 2022-06-07 RX ADMIN — Medication 650 MILLIGRAM(S): at 11:15

## 2022-06-07 NOTE — PROGRESS NOTE ADULT - SUBJECTIVE AND OBJECTIVE BOX
GOCOOL, LENNOX  MRN-[id number]  Patient is a 66y old  Male who presents with a chief complaint of LVAD/OHT eval (07 Jun 2022 18:10)    HPI:  66M Mixed Ischemic/NICM Cardiomyopathy (EF 25-30% at baseline, s/p BIV-ICD), CAD (medically managed MIs in 2008,2011, Most recent stent in April 2022 to mLAD) presented with multiple near syncope, found to have 41 episodes of VT and admitted initially to cardiac telemetry for further evaluation/management. Ischemic evaluation without new disease and VT ablation without substrate to complete ablation.   Transferred from [facility] for further management and evaluation for LVAD vs OHT.    (26 May 2022 20:31)      24 hr events: no acute events    REVIEW OF SYSTEMS:    CONSTITUTIONAL: No weakness, fevers or chills  EYES/ENT: No visual changes;  No vertigo or throat pain   NECK: No pain or stiffness  RESPIRATORY: No cough, wheezing, hemoptysis; No shortness of breath  CARDIOVASCULAR: No chest pain or palpitations  GASTROINTESTINAL: No abdominal or epigastric pain. No nausea, vomiting, or hematemesis; No diarrhea or constipation. No melena or hematochezia.  GENITOURINARY: No dysuria, frequency or hematuria  NEUROLOGICAL: No numbness or weakness  SKIN: No itching, rashes      Physical Exam:  Vital Signs Last 24 Hrs  T(C): 37.7 (07 Jun 2022 16:00), Max: 37.7 (07 Jun 2022 16:00)  T(F): 99.9 (07 Jun 2022 16:00), Max: 99.9 (07 Jun 2022 16:00)  HR: 80 (07 Jun 2022 18:00) (80 - 98)  BP: 108/78 (07 Jun 2022 08:00) (102/67 - 137/106)  BP(mean): 89 (07 Jun 2022 08:00) (77 - 119)  RR: 20 (07 Jun 2022 18:00) (12 - 34)  SpO2: 95% (07 Jun 2022 18:00) (91% - 99%)      ============================I/O===========================   I&O's Detail    06 Jun 2022 07:01  -  07 Jun 2022 07:00  --------------------------------------------------------  IN:    Heparin: 164.5 mL    IV PiggyBack: 100 mL    Milrinone: 152.9 mL    Oral Fluid: 240 mL  Total IN: 657.4 mL    OUT:    Voided (mL): 2000 mL  Total OUT: 2000 mL    Total NET: -1342.6 mL      07 Jun 2022 07:01  -  07 Jun 2022 19:32  --------------------------------------------------------  IN:    Heparin: 135.5 mL    IV PiggyBack: 50 mL    Milrinone: 119.9 mL    Oral Fluid: 600 mL  Total IN: 905.4 mL    OUT:    Voided (mL): 1950 mL  Total OUT: 1950 mL    Total NET: -1044.6 mL        ============================ LABS =========================                        13.7   13.16 )-----------( 304      ( 07 Jun 2022 04:12 )             41.2     06-07    133<L>  |  103  |  22  ----------------------------<  149<H>  4.5   |  21<L>  |  1.21    Ca    9.1      07 Jun 2022 04:12  Phos  2.8     06-07  Mg     2.1     06-07    TPro  6.3  /  Alb  3.4  /  TBili  0.6  /  DBili  x   /  AST  28  /  ALT  28  /  AlkPhos  55  06-07    LIVER FUNCTIONS - ( 07 Jun 2022 04:12 )  Alb: 3.4 g/dL / Pro: 6.3 g/dL / ALK PHOS: 55 U/L / ALT: 28 U/L / AST: 28 U/L / GGT: x           PT/INR - ( 07 Jun 2022 07:08 )   PT: 12.2 sec;   INR: 1.05 ratio         PTT - ( 07 Jun 2022 15:42 )  PTT:54.2 sec      ======================Micro/Rad/Cardio=================  Culture: Reviewed   CXR: Reviewed  Echo:Reviewed  ======================================================  PAST MEDICAL & SURGICAL HISTORY:  AV block      Essential hypertension      Chronic HFrEF (heart failure with reduced ejection fraction)      Chronic kidney disease, unspecified CKD stage      CAD (coronary artery disease)      HLD (hyperlipidemia)      Type 2 diabetes mellitus      Artificial cardiac pacemaker        ====================ASSESSMENT ==============  67 y/o male w/ PMHx HTN, HLD, type 2 DM, chronic HFrEF (EF 25-30% by Echo), complete heart block s/p PPM (in 2006, upgraded to BiV-ICD in 09/2016), CAD (s/p recent BERNABE mid LAD at [facility] on 04/18/2022), and stage II CKD (baseline Cr ~1.3) presented with near syncope to OSH found to have 41 episodes of VT on device, s/p unsuccessful VT ablation and transferred to [facility] for management of cardiogenic shock and advanced therapy eval.     Plan:  ====================== NEUROLOGY=====================  - AAOx3  - No active issues    acetaminophen     Tablet .. 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 low diastolic pulmonary gradient  - bedside nipride study done 5/29 with reduction in PVR to <2, still with elevated PA pressures   - SPO2 94-96% on room air     ====================CARDIOVASCULAR==================  # Cardiogenic shock   - TTE 5/21: LV 5.2 cm, LVEF 10-15%, LVOT VTI 10 cm, moderate RV dysfunction, mild AI, minimal MR  - LHC 5/23: patent mLAD stent with slow flow, D1 with 40-50% stenosis  - RHC 5/26: RA 12, PA 70/40 (50), PCWP 22, Milana CO/CI 2.3/1.3, MAP 83 with SVR 2469, PVR 12 PERSAUD  - 5/27: RA 10, PA 76/35 (49), PCWP 34, PA sat 57% with Milana CO/CI 3.1/1.6, MAP 71 with SVR 1574, PVR 4.8  - 6/6 RHC: CVP 17, RV 75/4, PA 80/36, PCW 22, CI 1.88. IABP placed and pt transferred to CICU.   - Maintain Milrinone .5mcg/kg/min   - Cont hydral 100 TID and ISDN 20 TID for AL Reduction, Assisted mean goal 70-80 - may consider nipride gtt if oral therapy inefficient   - monitor hemodynamics via swan and perfusion indices   - HF following     # VTach   - s/p EPS on 5/24, unable to perform ablation as no substrate found and did not induce VT because of severely low EF   - Interrogation of ICD @Dr Wilson's office 5/20: back-to-back episodes of VT @ 200+ bpm all terminating with ATP. Since last cath pt has had 41 VT episodes (all falling into VF zone)  - Normal device function. BIV pacing 97%. No programming changes made  -. c/w Amiodarone  - c/w Toprol 25 daily   - pending OHT listing   - no further VT on inotropes     # CAD   - Chest pain free and compliant with DAPT since last PCI 4/18/22  - Cardiac cath @[facility] 4/18/22: mLAD 90% s/p BERNABE, LCx mild disease, RCA mild disease s/p diagnostic cardiac cath w/ Dr Wagner on 05/23/2022   - Cont ASA and Lipitor   - per discussion with interventional at OSH, ok to DC plavix, last dose 5/28    aMIOdarone    Tablet 200 milliGRAM(s) Oral daily  hydrALAZINE 100 milliGRAM(s) Oral every 8 hours  isosorbide   dinitrate Tablet (ISORDIL) 20 milliGRAM(s) Oral three times a day  metoprolol succinate ER 25 milliGRAM(s) Oral daily  milrinone Infusion 0.5 MICROgram(s)/kG/Min (11 mL/Hr) IV Continuous <Continuous>    ===================HEMATOLOGIC/ONC ===================  Secondary Polycythemia Vera   - elevated EPO, hgb in normal range  - Heme following peripherally  - f/u MELINA-2     VTE ppx  - Heparin gtt for IABP, low ptt goal     aspirin enteric coated 81 milliGRAM(s) Oral daily  heparin  Infusion 1200 Unit(s)/Hr (12.5 mL/Hr) IV Continuous <Continuous>    ===================== RENAL =========================  # JAGRUTI on CKD II  Admitted w/ Cr 2.01 (baseline Cr ~1.3) was downtrending with inotropic support, 6/5 started increasing again to 1.64  - likely i/s/o cardiogenic shock given RHC findings and CI of 1.88  - Avoid nephrotoxic agents, NSAIDs. Renally dose meds.  - monitor strict IOs and continue current cardiac support, repat SCr 4PM post IABP insertion       ==================== GASTROINTESTINAL===================  - No active issues  - Cont diabetic diet   - Cont. PPI and bowel regimen, last BM 6/6  - s/p colonoscopy 6/3 with 5 polyps removed and biopsied, benign findings- GI recs for 3 yr follow up colo       pantoprazole    Tablet 40 milliGRAM(s) Oral before breakfast  simethicone 80 milliGRAM(s) Chew every 6 hours PRN Gas    =======================    ENDOCRINE  =====================  # T2DM (A1C 6.2 on admission)  - Cont. ISS, glucose controlled, monitor FS closely       allopurinol 100 milliGRAM(s) Oral daily  atorvastatin 80 milliGRAM(s) Oral at bedtime  insulin lispro (ADMELOG) corrective regimen sliding scale   SubCutaneous at bedtime  insulin lispro (ADMELOG) corrective regimen sliding scale   SubCutaneous three times a day before meals    ========================INFECTIOUS DISEASE================  # RUE Thrombophlebitis  - RUE US Duplex showing superficial thrombus  - cont ancef 1g q8 - 6/7     =======================MSK=============================  # Gout flare, right knee   - continue allopurinol, s/p prednisone       Patient requires continuous monitoring with bedside rhythm monitoring, pulse ox monitoring, and intermittent blood gas analysis. Care plan discussed with ICU care team. Patient remained critical and at risk for life threatening decompensation.  Patient seen, examined and plan discussed with CCU team during rounds.     I have personally provided 35 minutes of critical care time excluding time spent on separate procedures, in addition to initial critical care time provided by the CICU Attending, Dr. Leonardo

## 2022-06-07 NOTE — PROGRESS NOTE ADULT - ASSESSMENT
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 5/20 with near syncope in setting of worsening HF symptoms and found to have 41 episodes of VT, many terminating with ATP. LHC did not reveal new obstructive CAD and he underwent EPS which did not reveal endocardial substrate amenable for ablation. RHC revealed severely depressed cardiac output and he was transferred to [facility] 5/26 for advanced therapies evaluation.    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 He improved significantly with sequential uptitration of inotropes and increased pacing rate, but on 6/6 he had worsening JAGRUTI. He is s/p RHC which revealed severely elevated filling pressures, severe cpcPH, and CI of 1.9 on milrinone 0.5. IABP was placed and his renal function/PAPs have improved. He is MCS dependent and was inadequately supported with high dose inotropes, so is now listed UNOS status 2e for OHT, awaiting suitable donor. Will continue to augment vasodilators for afterload reduction.     Review of studies  TTE 5/21: LV 5.2 cm, LVEF 10-15%, LVOT VTI 10 cm, moderate RV dysfunction, mild AI, minimal MR  EKG: a-BiV paced  [facility] 5/23: patent mLAD stent with slow flow, D1 with 40-50% stenosis, mild disease otherwise  RHC 5/26: RA 12, PA 70/40 (50), PCWP 22, Milana CO/CI 2.3/1.3, MAP 83 with SVR 2469, PVR 12 PERSAUD    Hemodynamics  5/27 (milrinone 0.25): RA 10, PA 76/35 (49), PCWP 34, PA sat 57% with Milana CO/CI 3.1/1.6, MAP 71 with SVR 1574, PVR 4.8  5/28 (on milrinone 0.375): RA 7, PA 63/31, PCWP 26, PA sat 72.8% with Milana CO/CI 4.9/2.5 (CI later dropped to 1.6 in the afternoon), MAP 70 with SVR 1039, PVR 2.9  5/29 (on milrinone 0.5): RA 8, PA 75/32 (50), PCWP 28, PA sat 74% with Milana CO/CI 5.0/2.6, MAP 77 with SVR 1200, PVR 4.4  5/29 (on milrinone 0.5 and nipride to 3 mcg/kg/min): RA 6, PA 59/31 (40), PCWP 30, PA sat 79% with Milana CO/CI 7.0/3.6, BP 97/57 (MAP 70) with , PVR 1.4  6/6 RHC (mil 0.5): RA 11 (v13), RV 75/17, PA 80/36/52, PCWP 24 (v29), PA sat 59.5%, CO/CI (F) 3.58/1.88  6/7 (mil 0.5, IABP 1:1): CVP 5, PA 66/32/45, PA sat 65.7%, CO/CI (F) 4.0/2.1, SVR 2000

## 2022-06-07 NOTE — PROGRESS NOTE ADULT - PROBLEM SELECTOR PLAN 5
- hx of VT s/p unsuccessful VT ablation  - continue on amio 200 mg PO QD  - since being admitted to [facility] has not had any episodes of VT, currently tolerating high dose milrinone.

## 2022-06-07 NOTE — PROGRESS NOTE ADULT - PROBLEM SELECTOR PLAN 1
- continue IABP 1:1  - continue milrinone 0.5 mcg/kg/min  - increase HDZN/ISDN as tolerated for goal SVR <1500; can start nipride if needed   - trend perfusion labs (MvO2, lactate, and LFTs)

## 2022-06-07 NOTE — PROGRESS NOTE ADULT - SUBJECTIVE AND OBJECTIVE BOX
INFECTIOUS DISEASES FOLLOW UP-- Arabella Escalante  [phone]    This is a follow up note for this  66yMale with  Cardiomyopathy  pre heart transplant        ROS:  CONSTITUTIONAL:  No fever, good appetite  CARDIOVASCULAR:  No chest pain or palpitations  RESPIRATORY:  No dyspnea  GASTROINTESTINAL:  No nausea, vomiting, diarrhea, or abdominal pain  GENITOURINARY:  No dysuria  NEUROLOGIC:  No headache,     Allergies    No Known Allergies    Intolerances        ANTIBIOTICS/RELEVANT:  antimicrobials    immunologic:  influenza   Vaccine 0.5 milliLiter(s) IntraMuscular once    OTHER:  acetaminophen     Tablet .. 650 milliGRAM(s) Oral every 6 hours PRN  allopurinol 100 milliGRAM(s) Oral daily  aMIOdarone    Tablet 200 milliGRAM(s) Oral daily  aspirin enteric coated 81 milliGRAM(s) Oral daily  atorvastatin 80 milliGRAM(s) Oral at bedtime  chlorhexidine 2% Cloths 1 Application(s) Topical daily  chlorhexidine 4% Liquid 1 Application(s) Topical <User Schedule>  heparin  Infusion 1200 Unit(s)/Hr IV Continuous <Continuous>  hydrALAZINE 100 milliGRAM(s) Oral every 8 hours  insulin lispro (ADMELOG) corrective regimen sliding scale   SubCutaneous at bedtime  insulin lispro (ADMELOG) corrective regimen sliding scale   SubCutaneous three times a day before meals  isosorbide   dinitrate Tablet (ISORDIL) 20 milliGRAM(s) Oral three times a day  lidocaine   4% Patch 1 Patch Transdermal daily  metoprolol succinate ER 25 milliGRAM(s) Oral daily  milrinone Infusion 0.5 MICROgram(s)/kG/Min IV Continuous <Continuous>  pantoprazole    Tablet 40 milliGRAM(s) Oral before breakfast  simethicone 80 milliGRAM(s) Chew every 6 hours PRN      Objective:  Vital Signs Last 24 Hrs  T(C): 37.7 (07 Jun 2022 16:00), Max: 37.7 (07 Jun 2022 16:00)  T(F): 99.9 (07 Jun 2022 16:00), Max: 99.9 (07 Jun 2022 16:00)  HR: 80 (07 Jun 2022 16:00) (80 - 98)  BP: 108/78 (07 Jun 2022 08:00) (102/67 - 137/106)  BP(mean): 89 (07 Jun 2022 08:00) (77 - 119)  RR: 20 (07 Jun 2022 16:00) (12 - 34)  SpO2: 93% (07 Jun 2022 16:00) (91% - 99%)    PHYSICAL EXAM:  Constitutional:no acute distress  Eyes:JOHNY, EOMI  Ear/Nose/Throat: no oral lesions, 	  Respiratory: clear BL  Cardiovascular: S1S2  Gastrointestinal:soft, (+) BS, no tenderness  Extremities:no e/e/c improved RUE phlebitis  RLE femoral IABP  No Lymphadenopathy  IV sites not inflammed.    LABS:                        13.7   13.16 )-----------( 304      ( 07 Jun 2022 04:12 )             41.2     06-07    133<L>  |  103  |  22  ----------------------------<  149<H>  4.5   |  21<L>  |  1.21    Ca    9.1      07 Jun 2022 04:12  Phos  2.8     06-07  Mg     2.1     06-07    TPro  6.3  /  Alb  3.4  /  TBili  0.6  /  DBili  x   /  AST  28  /  ALT  28  /  AlkPhos  55  06-07    PT/INR - ( 07 Jun 2022 07:08 )   PT: 12.2 sec;   INR: 1.05 ratio         PTT - ( 07 Jun 2022 15:42 )  PTT:54.2 sec      MICROBIOLOGY:            RECENT CULTURES:  06-02 @ 15:54  .Blood Blood-Peripheral  --  --  --    No growth to date.  --  06-02 @ 15:40  .Blood Blood-Peripheral  --  --  --    No growth to date.  --      RADIOLOGY & ADDITIONAL STUDIES:  < from: Xray Chest 1 View- PORTABLE-Urgent (Xray Chest 1 View- PORTABLE-Urgent .) (06.07.22 @ 14:58) >  INTERPRETATION:  IMPRESSION: Right IJ approach Badger-Serafin catheter with   tip in the right interlobar pulmonary artery. IABP marker approximately 2   cm below the superior aspect of the aortic knob.    < end of copied text >

## 2022-06-07 NOTE — PROGRESS NOTE ADULT - ASSESSMENT
67 y/o [place] M PMHx mixed Ischemic/NICM cardiomyopathy (EF 25-30% at baseline, s/p BIV-ICD) and CAD, initially presented to [facility] with near syncope, found to intermittent episodes of VT and admitted to cardiac telemetry for further evaluation/management. S/p unsuccessful VT ablation. Now transferred to [facility] CCU for advanced therapies evaluation. S/p Tornado placement in CCU, course c/b fever on 5/28, s/p subsequent Tornado removal. Blood cultures no growth, no further fevers.     Last fever 100.6 (5/28)  Blood Cultures (5/28) no growth  Urinalysis unremarkable  CT Chest (5/28) mild pulmonary edema    Immunizations:  COVID Pfizer x2 (10/2021)    Pre-Transplant Labs:  HAV IgG+  HBsAb-, HBsAg-, HBcAb-   HCV Ab-  HSV 1/2 IgG +/-  EBV negative  CMV IgG+  Toxoplasma IgG-  VZV IgG+  Measles IgG+  Mumps IgG+   Rubella IgG-  Quantiferon Gold negative  HIV Test negative  Syphilis Screen negative  Strongyloides Ab negative    Impression:  #RUE Thrombophlebitis  #Fever - in setting of central line, now resolved s/p removal. Blood cultures no growth  #Leukocytosis - 2/2 steroids vs phlebitis (improved)  #Pre-Advanced Therapies Eval    Recs:  - completed cefazolin 1g IV q8H for RUE phlebitis on 6/7  - RUE US Duplex showing superficial thrombus  -  blood cultures negative  - s/p first dose of hep B vaccine on 6/3/22  - would administer Prevnar 20 vaccine dose  - no contra indication to transplant listing from an ID perspective    Reggie Escalante MD  Can be called via Teams  After 5pm/weekends [phone]

## 2022-06-07 NOTE — PROGRESS NOTE ADULT - PROBLEM SELECTOR PLAN 7
- 100.6 fever on 5/28, cultures NGTD  - Transplant ID continued, appreciate recommendations  - leukocytosis remains elevated, though afebrile.   - right arm concerning for phlebitis, was started on cefazolin IV  - RUE doppler 6/3 with no evidence of DVT, but did have an occlusive thrombus within the superficial veins (cephalic and basilic)  - BCx NGTD

## 2022-06-07 NOTE — PROGRESS NOTE ADULT - SUBJECTIVE AND OBJECTIVE BOX
Subjective:  - s/p RHC and IABP placement yesterday  - currently resting comfortably in bed, but endorses some abdominal discomfort  - had 2 BMs yesterday with some relief, but still with ongoing pain this morning    Medications:  acetaminophen     Tablet .. 650 milliGRAM(s) Oral every 6 hours PRN  allopurinol 100 milliGRAM(s) Oral daily  aMIOdarone    Tablet 200 milliGRAM(s) Oral daily  aspirin enteric coated 81 milliGRAM(s) Oral daily  atorvastatin 80 milliGRAM(s) Oral at bedtime  ceFAZolin   IVPB 1000 milliGRAM(s) IV Intermittent every 8 hours  chlorhexidine 2% Cloths 1 Application(s) Topical daily  chlorhexidine 4% Liquid 1 Application(s) Topical <User Schedule>  heparin  Infusion 1200 Unit(s)/Hr IV Continuous <Continuous>  hydrALAZINE 75 milliGRAM(s) Oral every 8 hours  influenza   Vaccine 0.5 milliLiter(s) IntraMuscular once  insulin lispro (ADMELOG) corrective regimen sliding scale   SubCutaneous at bedtime  isosorbide   dinitrate Tablet (ISORDIL) 20 milliGRAM(s) Oral three times a day  lidocaine   4% Patch 1 Patch Transdermal daily  metoprolol succinate ER 25 milliGRAM(s) Oral daily  milrinone Infusion 0.5 MICROgram(s)/kG/Min IV Continuous <Continuous>  pantoprazole    Tablet 40 milliGRAM(s) Oral before breakfast  polyethylene glycol 3350 17 Gram(s) Oral two times a day  senna 2 Tablet(s) Oral at bedtime      ICU Vital Signs Last 24 Hrs  T(C): 36.4, Max: 37 (-06 @ 15:00)  HR: 95 (80 - 98)  BP: 108/78 (102/67 - 137/106)  BP(mean): 89 (77 - 119)  ABP: --  ABP(mean): --  RR: 20 (12 - 32)  SpO2: 97% (92% - 99%)    Weight in k.5 (22)  Weight in k.5 (22)      I&O's Summary Last 24 Hrs    IN: 657.4 mL / OUT: 2000 mL / NET: -1342.6 mL      Tele: paced, HR 80    Physical Exam:    General: NAD.  HEENT: EOM intact.  Neck: JVP not elevated.  Respiratory: Clear to auscultation bilaterally  CV: Normal S1 and S2. No murmurs, rub, or gallops. Radial and DP pulses normal.  Abdomen: Soft, non-distended  Skin: No skin lesions  Extremities: Warm, no edema  Neurology: Non-focal, alert and oriented times three.   Psych: Affect normal    Labs:    ( 22 @ 04:12 )               13.7   13.16 )--------( 304                  41.2     ( 22 @ 04:12 )     133  |  103  |  22  ---------------------<  149  4.5  |  21  |  1.21    Ca 9.1  Phos 2.8  Mg 2.1    ( 22 @ 04:12 )  TPro  6.3  /  Alb  3.4  /  TBili  0.6  /  DBili  x   /  AST  28  /  ALT  28  /  AlkPhos  55  ( 22 @ 16:22 )  TPro  6.1  /  Alb  3.0  /  TBili  0.7  /  DBili  x   /  AST  29  /  ALT  22  /  AlkPhos  53    PTT/PT/INR - ( 22 @ 07:08 )  PTT: 45.1 sec / PT: 12.2 sec / INR: 1.05 ratio    ( 22 @ 20:22 )  TropHS 93    /    / CKMB x      ( 22 @ 16:53 )  TropHS x     /    / CKMB x        Serum Pro-Brain Natriuretic Peptide: 3102 (22 @ 15:23)    Oxygen Saturation, Mixed: 65.7 (22 @ 03:50)  Oxygen Saturation, Mixed: 63.6 (22 @ 16:00)    Lactate, Blood: 1.0 (22 @ 04:12)  Lactate, Blood: 1.0 (22 @ 16:22)

## 2022-06-07 NOTE — PROGRESS NOTE ADULT - CRITICAL CARE ATTENDING COMMENT
65 y/o M with mixed ICM/NICM, admitted with cardiogenic shock and many episodes of VT. Underwent evaluation for advanced therapies. Found to have elevated pulmonary pressures but better on inotropes but he remained inadequately supported. Yesterday had IABP placed and was listed for heart transplant UNOS status 2E. Continue IABP and inotropes. Continue afterload reduction with Isordil/Hydralazine.   Abdominal pain today. Check KUB.  Awaiting suitable donor.

## 2022-06-07 NOTE — PROGRESS NOTE ADULT - PROBLEM SELECTOR PLAN 2
- continue Toprol XL 25 mg PO QD   - currently listed UNOS status 2e for OHT, awaiting suitable donor (ABO A, PRA 0%)

## 2022-06-07 NOTE — PROGRESS NOTE ADULT - SUBJECTIVE AND OBJECTIVE BOX
PATIENT:  LENNOX GOCOOL  [id number]    CHIEF COMPLAINT:  Patient is a 66y old  Male who presents with a chief complaint of LVAD/OHT eval (2022 19:13)      INTERVAL HISTORYOVERNIGHT EVENTS:      REVIEW OF SYSTEMS:    Constitutional:     [ ] negative [ ] fevers [ ] chills [ ] weight loss [ ] weight gain  HEENT:                  [ ] negative [ ] dry eyes [ ] eye irritation [ ] postnasal drip [ ] nasal congestion  CV:                         [ ] negative  [ ] chest pain [ ] orthopnea [ ] palpitations [ ] murmur  Resp:                     [ ] negative [ ] cough [ ] shortness of breath [ ] dyspnea [ ] wheezing [ ] sputum [ ] hemoptysis  GI:                          [ ] negative [ ] nausea [ ] vomiting [ ] diarrhea [ ] constipation [ ] abd pain [ ] dysphagia   :                        [ ] negative [ ] dysuria [ ] nocturia [ ] hematuria [ ] increased urinary frequency  Musculoskeletal: [ ] negative [ ] back pain [ ] myalgias [ ] arthralgias [ ] fracture  Skin:                       [ ] negative [ ] rash [ ] itch  Neurological:        [ ] negative [ ] headache [ ] dizziness [ ] syncope [ ] weakness [ ] numbness  Psychiatric:           [ ] negative [ ] anxiety [ ] depression  Endocrine:            [ ] negative [ ] diabetes [ ] thyroid problem  Heme/Lymph:      [ ] negative [ ] anemia [ ] bleeding problem  Allergic/Immune: [ ] negative [ ] itchy eyes [ ] nasal discharge [ ] hives [ ] angioedema    [ ] All other systems negative  [ ] Unable to assess ROS because ________.    MEDICATIONS:  MEDICATIONS  (STANDING):  allopurinol 100 milliGRAM(s) Oral daily  aMIOdarone    Tablet 200 milliGRAM(s) Oral daily  aspirin enteric coated 81 milliGRAM(s) Oral daily  atorvastatin 80 milliGRAM(s) Oral at bedtime  ceFAZolin   IVPB 1000 milliGRAM(s) IV Intermittent every 8 hours  chlorhexidine 2% Cloths 1 Application(s) Topical daily  chlorhexidine 4% Liquid 1 Application(s) Topical <User Schedule>  heparin  Infusion 1200 Unit(s)/Hr (11.5 mL/Hr) IV Continuous <Continuous>  hydrALAZINE 50 milliGRAM(s) Oral three times a day  influenza   Vaccine 0.5 milliLiter(s) IntraMuscular once  insulin lispro (ADMELOG) corrective regimen sliding scale   SubCutaneous at bedtime  isosorbide   dinitrate Tablet (ISORDIL) 10 milliGRAM(s) Oral three times a day  lidocaine   4% Patch 1 Patch Transdermal daily  metoprolol succinate ER 25 milliGRAM(s) Oral daily  milrinone Infusion 0.5 MICROgram(s)/kG/Min (11 mL/Hr) IV Continuous <Continuous>  pantoprazole    Tablet 40 milliGRAM(s) Oral before breakfast  polyethylene glycol 3350 17 Gram(s) Oral two times a day  senna 2 Tablet(s) Oral at bedtime    MEDICATIONS  (PRN):  acetaminophen     Tablet .. 650 milliGRAM(s) Oral every 6 hours PRN Temp greater or equal to 38C (100.4F), Mild Pain (1 - 3)      ALLERGIES:  Allergies    No Known Allergies    Intolerances        OBJECTIVE:  ICU Vital Signs Last 24 Hrs  T(C): 36.6 (2022 03:00), Max: 37 (2022 15:00)  T(F): 97.9 (2022 03:00), Max: 98.6 (2022 15:00)  HR: 93 (2022 06:00) (80 - 93)  BP: 136/65 (2022 03:00) (102/67 - 137/106)  BP(mean): 90 (2022 03:00) (77 - 119)  ABP: --  ABP(mean): --  RR: 23 (2022 06:00) (12 - 23)  SpO2: 97% (2022 06:00) (92% - 98%)      Adult Advanced Hemodynamics Last 24 Hrs  CVP(mm Hg): 10 (2022 06:00) (0 - 21)  CVP(cm H2O): --  CO: 4 (2022 04:00) (4 - 4)  CI: 2.1 (2022 04:00) (2.1 - 2.1)  PA: 66/36 (2022 06:00) (43/10 - 73/28)  PA(mean): 47 (2022 06:00) (22 - 47)  PCWP: --  SVR:  (2022 04:00) ( - )  SVRI: --  PVR: --  PVRI: --  CAPILLARY BLOOD GLUCOSE      POCT Blood Glucose.: 145 mg/dL (2022 22:44)    CAPILLARY BLOOD GLUCOSE      POCT Blood Glucose.: 145 mg/dL (2022 22:44)    I&O's Summary    2022 07:01  -  2022 07:00  --------------------------------------------------------  IN: 657.4 mL / OUT: 2000 mL / NET: -1342.6 mL      Daily     Daily Weight in k.5 (2022 05:00)    PHYSICAL EXAMINATION:  General: WN/WD NAD  HEENT: PERRLA, EOMI, moist mucous membranes  Neurology: A&Ox3, nonfocal, BRADFORD x 4  Respiratory: CTA B/L, normal respiratory effort, no wheezes, crackles, rales  CV: RRR, S1S2, no murmurs, rubs or gallops  Abdominal: Soft, NT, ND +BS, Last BM  Extremities: No edema, + peripheral pulses  Incisions:   Tubes:    LABS:                          13.7   13.16 )-----------( 304      ( 2022 04:12 )             41.2     06-07    133<L>  |  103  |  22  ----------------------------<  149<H>  4.5   |  21<L>  |  1.21    Ca    9.1      2022 04:12  Phos  2.8     06-07  Mg     2.1     06-07    TPro  6.3  /  Alb  3.4  /  TBili  0.6  /  DBili  x   /  AST  28  /  ALT  28  /  AlkPhos  55  06-07    LIVER FUNCTIONS - ( 2022 04:12 )  Alb: 3.4 g/dL / Pro: 6.3 g/dL / ALK PHOS: 55 U/L / ALT: 28 U/L / AST: 28 U/L / GGT: x           PT/INR - ( 2022 23:04 )   PT: 12.9 sec;   INR: 1.12 ratio         PTT - ( 2022 23:04 )  PTT:67.6 sec        Assessment/Plan:  65 y/o male w/ PMHx HTN, HLD, type 2 DM, chronic HFrEF (EF 25-30% by Echo), complete heart block s/p PPM (in , upgraded to BiV-ICD in 2016), CAD (s/p recent BERNABE mid LAD at [facility] on 2022), and stage II CKD (baseline Cr ~1.3) presented with near syncope to OSH found to have 41 episodes of VT on device, s/p unsuccessful VT ablation and transferred to [facility] for management of cardiogenic shock and advanced therapy eval.     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88. IABP placed and pt transferred to CICU.   - Maintain Milrinone .5mcg/kg/min   - Cont hydral 75 TID and ISDN 20 TID for AL Reduction, Assisted mean goal 70-80 - may consider nipride gtt if oral therapy inefficient   - monitor hemodynamics via swan and perfusion indices   - HF following     # VTach   - s/p EPS on , unable to perform ablation as no substrate found and did not induce VT because of severely low EF   - Interrogation of ICD @Dr Skipiatris's office : back-to-back episodes of VT @ 200+ bpm all terminating with ATP. Since last cath pt has had 41 VT episodes (all falling into VF zone)  - Normal device function. BIV pacing 97%. No programming changes made  -. c/w Amiodarone  - c/w Toprol 25 daily   - pending OHT listing   - no further VT on inotropes     # CAD   - Chest pain free and compliant with DAPT since last PCI 22  - Cardiac cath @[facility] 22: mLAD 90% s/p BERNABE, LCx mild disease, RCA mild disease s/p diagnostic cardiac cath w/ Dr Wagner on 2022   - Cont ASA and Lipitor   - per discussion with interventional at OSH, ok to DC plavix, last dose     GI  - No active issues  - Cont diabetic diet   - Cont. PPI and bowel regimen, last BM   - s/p colonoscopy 6/3 with 5 polyps removed and biopsied, benign findings- GI recs for 3 yr follow up colo     Renal  # JAGRUTI on CKD II  Admitted w/ Cr 2.01 (baseline Cr ~1.3) was downtrending with inotropic support,  started increasing again to 1.64  - likely i/s/o cardiogenic shock given RHC findings and CI of 1.88  - Avoid nephrotoxic agents, NSAIDs. Renally dose meds.  - monitor strict IOs and continue current cardiac support, repat SCr 4PM post IABP insertion     Heme/Onc  Secondary Polycythemia Vera   - elevated EPO, hgb in normal range  - Heme following peripherally  - f/u MELINA-2     VTE ppx  - Heparin gtt for IABP, low ptt goal     Endo   # T2DM (A1C 6.2 on admission)  - Cont. ISS, glucose controlled, monitor FS closely     MSK  # Gout flare, right knee   - continue allopurinol, s/p prednisone     ID  # [email or web address] Thrombophlebitis  - RUE US Duplex showing superficial thrombus  - cont ancef 1g q8 -      Lines  - RIJ Tappen   - RF IABP

## 2022-06-07 NOTE — PROGRESS NOTE ADULT - PROBLEM SELECTOR PLAN 3
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 Nipride study 5/29 with reduction in PVR to < 2 though still with elevated PA pressures   - Improved with MCS unloading

## 2022-06-07 NOTE — PROGRESS NOTE ADULT - SUBJECTIVE AND OBJECTIVE BOX
Behavioral Cardiology Psychological Assessment    History of present illness: Mr. Du is a 66-year-old male w/PMHx of HTN, HLD, type 2 DM, chronic HFrEF (25-30%), complete heart block s/p PPM (in , upgraded to BiV-ICD in 2016), CAD (s/p recent BERNABE mid LAD at [facility] on 2022), and stage II CKD), admitted for intermittent vtach, now s/p unsuccessful VT ablation. Patient transferred from [facility] to [facility] for further management and evaluation for LVAD vs. OHT.     Social history: Patient lives with his wife (Teresa, 68 y/o, [phone]) and 24 y/o daughter (Priyanka) in [place], [place], in private home. Patient has two other adult sons (Philip, age 35; and Aldo, age 30) who live in [place] and [place]. Patient’s mother  in her 80s of natural causes and father  in his 80s from a heart attack. Patient has four siblings who live in the area and who he is close to. Patient is a citizen, [place] from [facility] approximately 40 years ago. 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 Education: high school.    Substance use:   Tobacco: Patient started smoking approximately at age 21, stopped in  at time of heart block. During that time, he smoked approximately 1-2 cigarettes/day   Alcohol: Reported no current alcohol use, last time he used alcohol was prior to 2021. Before that time, he used to drink approximately 4 beers at a time, 2-3 times/week.   Drug: Denies current or past substance use.

## 2022-06-07 NOTE — PROGRESS NOTE ADULT - ASSESSMENT
Past psychiatric history: Denied.      Psychological assessment: Patient reported stomach pain which interfered with sleep, mood as "when this stomach pain resolves, I’ll be fine." Patient conveyed feeling "great" about news of eligibility for OHT, aware of having to remain in the hospital until he matches. We discussed coping strategies during hospitalization, continues to talk to wife and use prayer but feeling less anxious overall. Patient was able to ask wife to not visit today to take care of her health appointments, and we discussed caregiver stress. Patient continues to be interested in  service and future visits by writer.       Mental Status Exam: Seen sitting up in chair in CICU. Alert, oriented x4. Appropriate eye contact. Speech normal volume and rate. Thought process was logical and goal-oriented, content appropriate to conversation. No evidence of jack, delusions, psychosis. Denies SI/HI. Mood is euthymic. Affect is full range. Insight into disease is good, immediate judgment is adequate.     Dx: Adjustment disorder, unspecified. F43.20. Systolic heart failure, I50.2.    Recommendations:   Behavioral Cardiology will follow as needed.    service  Would benefit from additional education on heart failure guidelines, as well as LVAD and HT  Would benefit from involving family members in communication regarding medical status and recommendations  Patient would benefit from continued involvement from behavioral cardiology    20 minutes spent on patient encounter    Camille Early, MPH, MA  Psychology Extern     Ondina Wooten, PhD  Supervising Psychologist

## 2022-06-07 NOTE — PROGRESS NOTE ADULT - CRITICAL CARE ATTENDING COMMENT
Presented to [facility] with VT, found to be in cardiogenic shock and transferred to [facility]  A+Ox3  Listed for heart transplant  Cardiogenic shock, on Milrinone and IABP - maintain until transplant  On Hydralazine/Isordil for afterload reduction - titrate up as able  Contine Toprol for VT  S/p stent in 4/22, continue ASA but holding Plavix   O2 sats mid 90s on room air  DASH/diabetic diet; augment bowel regimen   Normal renal function, improved post IABP, CVP 5-6, autodiuresing - hold diuresis  H/H acceptable on Heparin drip for IABP  Afebrile, continue Ancef for UE cellulitis, last day today; ID is following  Sugars controlled  RIJ Cordis Vinson and IABP 6/6

## 2022-06-08 LAB
ALBUMIN SERPL ELPH-MCNC: 3 G/DL — LOW (ref 3.3–5)
ALP SERPL-CCNC: 57 U/L — SIGNIFICANT CHANGE UP (ref 40–120)
ALT FLD-CCNC: 24 U/L — SIGNIFICANT CHANGE UP (ref 10–45)
ANION GAP SERPL CALC-SCNC: 18 MMOL/L — HIGH (ref 5–17)
APPEARANCE UR: CLEAR — SIGNIFICANT CHANGE UP
APTT BLD: 46.8 SEC — HIGH (ref 27.5–35.5)
APTT BLD: 54.9 SEC — HIGH (ref 27.5–35.5)
APTT BLD: 59.6 SEC — HIGH (ref 27.5–35.5)
APTT BLD: 68.2 SEC — HIGH (ref 27.5–35.5)
AST SERPL-CCNC: 22 U/L — SIGNIFICANT CHANGE UP (ref 10–40)
BACTERIA # UR AUTO: NEGATIVE — SIGNIFICANT CHANGE UP
BASE EXCESS BLDMV CALC-SCNC: -0.7 MMOL/L — SIGNIFICANT CHANGE UP (ref -3–3)
BASE EXCESS BLDMV CALC-SCNC: -1.4 MMOL/L — SIGNIFICANT CHANGE UP (ref -3–3)
BASOPHILS # BLD AUTO: 0 K/UL — SIGNIFICANT CHANGE UP (ref 0–0.2)
BASOPHILS NFR BLD AUTO: 0 % — SIGNIFICANT CHANGE UP (ref 0–2)
BILIRUB SERPL-MCNC: 0.8 MG/DL — SIGNIFICANT CHANGE UP (ref 0.2–1.2)
BILIRUB UR-MCNC: NEGATIVE — SIGNIFICANT CHANGE UP
BUN SERPL-MCNC: 16 MG/DL — SIGNIFICANT CHANGE UP (ref 7–23)
CALCIUM SERPL-MCNC: 9.2 MG/DL — SIGNIFICANT CHANGE UP (ref 8.4–10.5)
CHLORIDE SERPL-SCNC: 103 MMOL/L — SIGNIFICANT CHANGE UP (ref 96–108)
CO2 BLDMV-SCNC: 24 MMOL/L — SIGNIFICANT CHANGE UP (ref 21–29)
CO2 BLDMV-SCNC: 24 MMOL/L — SIGNIFICANT CHANGE UP (ref 21–29)
CO2 SERPL-SCNC: 19 MMOL/L — LOW (ref 22–31)
COLOR SPEC: SIGNIFICANT CHANGE UP
CREAT SERPL-MCNC: 1.12 MG/DL — SIGNIFICANT CHANGE UP (ref 0.5–1.3)
DIFF PNL FLD: ABNORMAL
EGFR: 73 ML/MIN/1.73M2 — SIGNIFICANT CHANGE UP
EOSINOPHIL # BLD AUTO: 0.25 K/UL — SIGNIFICANT CHANGE UP (ref 0–0.5)
EOSINOPHIL NFR BLD AUTO: 1.8 % — SIGNIFICANT CHANGE UP (ref 0–6)
EPI CELLS # UR: 0 /HPF — SIGNIFICANT CHANGE UP
GAS PNL BLDMV: SIGNIFICANT CHANGE UP
GAS PNL BLDMV: SIGNIFICANT CHANGE UP
GLUCOSE BLDC GLUCOMTR-MCNC: 108 MG/DL — HIGH (ref 70–99)
GLUCOSE BLDC GLUCOMTR-MCNC: 125 MG/DL — HIGH (ref 70–99)
GLUCOSE BLDC GLUCOMTR-MCNC: 137 MG/DL — HIGH (ref 70–99)
GLUCOSE BLDC GLUCOMTR-MCNC: 150 MG/DL — HIGH (ref 70–99)
GLUCOSE SERPL-MCNC: 118 MG/DL — HIGH (ref 70–99)
GLUCOSE UR QL: NEGATIVE — SIGNIFICANT CHANGE UP
HCO3 BLDMV-SCNC: 23 MMOL/L — SIGNIFICANT CHANGE UP (ref 20–28)
HCO3 BLDMV-SCNC: 23 MMOL/L — SIGNIFICANT CHANGE UP (ref 20–28)
HCT VFR BLD CALC: 41.3 % — SIGNIFICANT CHANGE UP (ref 39–50)
HGB BLD-MCNC: 13.6 G/DL — SIGNIFICANT CHANGE UP (ref 13–17)
HOROWITZ INDEX BLDMV+IHG-RTO: 21 — SIGNIFICANT CHANGE UP
HOROWITZ INDEX BLDMV+IHG-RTO: 21 — SIGNIFICANT CHANGE UP
HYALINE CASTS # UR AUTO: 0 /LPF — SIGNIFICANT CHANGE UP (ref 0–2)
INR BLD: 1.16 RATIO — SIGNIFICANT CHANGE UP (ref 0.88–1.16)
KETONES UR-MCNC: NEGATIVE — SIGNIFICANT CHANGE UP
LACTATE BLDV-MCNC: 1.1 MMOL/L — SIGNIFICANT CHANGE UP (ref 0.7–2)
LEUKOCYTE ESTERASE UR-ACNC: NEGATIVE — SIGNIFICANT CHANGE UP
LYMPHOCYTES # BLD AUTO: 1.94 K/UL — SIGNIFICANT CHANGE UP (ref 1–3.3)
LYMPHOCYTES # BLD AUTO: 14 % — SIGNIFICANT CHANGE UP (ref 13–44)
MAGNESIUM SERPL-MCNC: 1.7 MG/DL — SIGNIFICANT CHANGE UP (ref 1.6–2.6)
MAGNESIUM SERPL-MCNC: 1.8 MG/DL — SIGNIFICANT CHANGE UP (ref 1.6–2.6)
MCHC RBC-ENTMCNC: 30 PG — SIGNIFICANT CHANGE UP (ref 27–34)
MCHC RBC-ENTMCNC: 32.9 GM/DL — SIGNIFICANT CHANGE UP (ref 32–36)
MCV RBC AUTO: 91 FL — SIGNIFICANT CHANGE UP (ref 80–100)
MONOCYTES # BLD AUTO: 1.09 K/UL — HIGH (ref 0–0.9)
MONOCYTES NFR BLD AUTO: 7.9 % — SIGNIFICANT CHANGE UP (ref 2–14)
NEUTROPHILS # BLD AUTO: 10.57 K/UL — HIGH (ref 1.8–7.4)
NEUTROPHILS NFR BLD AUTO: 76.3 % — SIGNIFICANT CHANGE UP (ref 43–77)
NITRITE UR-MCNC: NEGATIVE — SIGNIFICANT CHANGE UP
O2 CT VFR BLD CALC: 42 MMHG — SIGNIFICANT CHANGE UP (ref 30–65)
O2 CT VFR BLD CALC: 43 MMHG — SIGNIFICANT CHANGE UP (ref 30–65)
PCO2 BLDMV: 36 MMHG — SIGNIFICANT CHANGE UP (ref 30–65)
PCO2 BLDMV: 36 MMHG — SIGNIFICANT CHANGE UP (ref 30–65)
PH BLDMV: 7.41 — SIGNIFICANT CHANGE UP (ref 7.32–7.45)
PH BLDMV: 7.42 — SIGNIFICANT CHANGE UP (ref 7.32–7.45)
PH UR: 6 — SIGNIFICANT CHANGE UP (ref 5–8)
PHOSPHATE SERPL-MCNC: 3.1 MG/DL — SIGNIFICANT CHANGE UP (ref 2.5–4.5)
PLATELET # BLD AUTO: 242 K/UL — SIGNIFICANT CHANGE UP (ref 150–400)
POTASSIUM SERPL-MCNC: 4.7 MMOL/L — SIGNIFICANT CHANGE UP (ref 3.5–5.3)
POTASSIUM SERPL-SCNC: 4.7 MMOL/L — SIGNIFICANT CHANGE UP (ref 3.5–5.3)
PROT SERPL-MCNC: 6.5 G/DL — SIGNIFICANT CHANGE UP (ref 6–8.3)
PROT UR-MCNC: SIGNIFICANT CHANGE UP
PROTHROM AB SERPL-ACNC: 13.5 SEC — HIGH (ref 10.5–13.4)
RAPID RVP RESULT: SIGNIFICANT CHANGE UP
RBC # BLD: 4.54 M/UL — SIGNIFICANT CHANGE UP (ref 4.2–5.8)
RBC # FLD: 15.9 % — HIGH (ref 10.3–14.5)
RBC CASTS # UR COMP ASSIST: 1 /HPF — SIGNIFICANT CHANGE UP (ref 0–4)
SAO2 % BLDMV: 68 — SIGNIFICANT CHANGE UP (ref 60–90)
SAO2 % BLDMV: 72.7 — SIGNIFICANT CHANGE UP (ref 60–90)
SARS-COV-2 RNA SPEC QL NAA+PROBE: SIGNIFICANT CHANGE UP
SODIUM SERPL-SCNC: 135 MMOL/L — SIGNIFICANT CHANGE UP (ref 135–145)
SP GR SPEC: 1.01 — SIGNIFICANT CHANGE UP (ref 1.01–1.02)
UROBILINOGEN FLD QL: NEGATIVE — SIGNIFICANT CHANGE UP
WBC # BLD: 13.85 K/UL — HIGH (ref 3.8–10.5)
WBC # FLD AUTO: 13.85 K/UL — HIGH (ref 3.8–10.5)
WBC UR QL: 0 /HPF — SIGNIFICANT CHANGE UP (ref 0–5)

## 2022-06-08 PROCEDURE — 99292 CRITICAL CARE ADDL 30 MIN: CPT

## 2022-06-08 PROCEDURE — 99291 CRITICAL CARE FIRST HOUR: CPT | Mod: 25

## 2022-06-08 PROCEDURE — 71045 X-RAY EXAM CHEST 1 VIEW: CPT | Mod: 26

## 2022-06-08 PROCEDURE — 90791 PSYCH DIAGNOSTIC EVALUATION: CPT

## 2022-06-08 PROCEDURE — 93010 ELECTROCARDIOGRAM REPORT: CPT

## 2022-06-08 PROCEDURE — 99291 CRITICAL CARE FIRST HOUR: CPT

## 2022-06-08 PROCEDURE — 99292 CRITICAL CARE ADDL 30 MIN: CPT | Mod: 25

## 2022-06-08 PROCEDURE — 90832 PSYTX W PT 30 MINUTES: CPT

## 2022-06-08 PROCEDURE — 99232 SBSQ HOSP IP/OBS MODERATE 35: CPT

## 2022-06-08 RX ORDER — ISOSORBIDE DINITRATE 5 MG/1
40 TABLET ORAL THREE TIMES A DAY
Refills: 0 | Status: DISCONTINUED | OUTPATIENT
Start: 2022-06-08 | End: 2022-06-21

## 2022-06-08 RX ORDER — MAGNESIUM SULFATE 500 MG/ML
1 VIAL (ML) INJECTION ONCE
Refills: 0 | Status: COMPLETED | OUTPATIENT
Start: 2022-06-08 | End: 2022-06-08

## 2022-06-08 RX ADMIN — ISOSORBIDE DINITRATE 20 MILLIGRAM(S): 5 TABLET ORAL at 06:50

## 2022-06-08 RX ADMIN — Medication 100 MILLIGRAM(S): at 05:49

## 2022-06-08 RX ADMIN — Medication 100 GRAM(S): at 15:26

## 2022-06-08 RX ADMIN — Medication 25 MILLIGRAM(S): at 05:49

## 2022-06-08 RX ADMIN — HEPARIN SODIUM 12 UNIT(S)/HR: 5000 INJECTION INTRAVENOUS; SUBCUTANEOUS at 07:00

## 2022-06-08 RX ADMIN — ISOSORBIDE DINITRATE 40 MILLIGRAM(S): 5 TABLET ORAL at 17:14

## 2022-06-08 RX ADMIN — ISOSORBIDE DINITRATE 40 MILLIGRAM(S): 5 TABLET ORAL at 11:48

## 2022-06-08 RX ADMIN — AMIODARONE HYDROCHLORIDE 200 MILLIGRAM(S): 400 TABLET ORAL at 05:49

## 2022-06-08 RX ADMIN — MILRINONE LACTATE 11 MICROGRAM(S)/KG/MIN: 1 INJECTION, SOLUTION INTRAVENOUS at 07:00

## 2022-06-08 RX ADMIN — Medication 650 MILLIGRAM(S): at 18:44

## 2022-06-08 RX ADMIN — HEPARIN SODIUM 12.5 UNIT(S)/HR: 5000 INJECTION INTRAVENOUS; SUBCUTANEOUS at 14:20

## 2022-06-08 RX ADMIN — Medication 100 MILLIGRAM(S): at 11:41

## 2022-06-08 RX ADMIN — PANTOPRAZOLE SODIUM 40 MILLIGRAM(S): 20 TABLET, DELAYED RELEASE ORAL at 05:49

## 2022-06-08 RX ADMIN — CHLORHEXIDINE GLUCONATE 1 APPLICATION(S): 213 SOLUTION TOPICAL at 02:23

## 2022-06-08 RX ADMIN — Medication 81 MILLIGRAM(S): at 11:41

## 2022-06-08 RX ADMIN — Medication 650 MILLIGRAM(S): at 18:18

## 2022-06-08 RX ADMIN — Medication 100 MILLIGRAM(S): at 22:25

## 2022-06-08 RX ADMIN — ATORVASTATIN CALCIUM 80 MILLIGRAM(S): 80 TABLET, FILM COATED ORAL at 22:25

## 2022-06-08 RX ADMIN — Medication 100 MILLIGRAM(S): at 12:33

## 2022-06-08 RX ADMIN — CHLORHEXIDINE GLUCONATE 1 APPLICATION(S): 213 SOLUTION TOPICAL at 02:24

## 2022-06-08 NOTE — PROGRESS NOTE ADULT - PROBLEM SELECTOR PLAN 5
- hx of VT s/p unsuccessful VT ablation  - continue on amio 200 mg PO QD  - since being admitted to [facility] has not had any episodes of VT, currently tolerating high dose milrinone.

## 2022-06-08 NOTE — PROGRESS NOTE ADULT - CRITICAL CARE ATTENDING COMMENT
65 y/o M with mixed ICM/NICM, admitted with cardiogenic shock and many episodes of VT. Underwent evaluation for advanced therapies. Found to have elevated pulmonary pressures but better on inotropes but he remained inadequately supported. IABP placed 6/6/2022 and was listed for heart transplant UNOS status 2E. Continue IABP and inotropes. Continue afterload reduction with Isordil/Hydralazine. SVR normalized with afterload reduction.   Fever overnight. RVP and UA negative. Blood cultures pending. OK to remove [place]. Discuss with Transplant ID.    Awaiting suitable donor.

## 2022-06-08 NOTE — PROGRESS NOTE ADULT - PROBLEM SELECTOR PLAN 1
- continue IABP 1:1  - continue milrinone 0.5 mcg/kg/min  - continue HDZN 100 mg TID and ISDN 20 mg TID for afterload reduction  - trend perfusion labs (MvO2, lactate, and LFTs)  - if repeat hemodynamics are stable this afternoon, can pull swan

## 2022-06-08 NOTE — PROGRESS NOTE ADULT - SUBJECTIVE AND OBJECTIVE BOX
PATIENT:  LENNOX GOCOOL  [id number]    CHIEF COMPLAINT:  Patient is a 66y old  Male who presents with a chief complaint of LVAD/OHT eval (2022 22:11)      INTERVAL HISTORYOVERNIGHT EVENTS:      REVIEW OF SYSTEMS:    Constitutional:     [ ] negative [ ] fevers [ ] chills [ ] weight loss [ ] weight gain  HEENT:                  [ ] negative [ ] dry eyes [ ] eye irritation [ ] postnasal drip [ ] nasal congestion  CV:                         [ ] negative  [ ] chest pain [ ] orthopnea [ ] palpitations [ ] murmur  Resp:                     [ ] negative [ ] cough [ ] shortness of breath [ ] dyspnea [ ] wheezing [ ] sputum [ ] hemoptysis  GI:                          [ ] negative [ ] nausea [ ] vomiting [ ] diarrhea [ ] constipation [ ] abd pain [ ] dysphagia   :                        [ ] negative [ ] dysuria [ ] nocturia [ ] hematuria [ ] increased urinary frequency  Musculoskeletal: [ ] negative [ ] back pain [ ] myalgias [ ] arthralgias [ ] fracture  Skin:                       [ ] negative [ ] rash [ ] itch  Neurological:        [ ] negative [ ] headache [ ] dizziness [ ] syncope [ ] weakness [ ] numbness  Psychiatric:           [ ] negative [ ] anxiety [ ] depression  Endocrine:            [ ] negative [ ] diabetes [ ] thyroid problem  Heme/Lymph:      [ ] negative [ ] anemia [ ] bleeding problem  Allergic/Immune: [ ] negative [ ] itchy eyes [ ] nasal discharge [ ] hives [ ] angioedema    [ ] All other systems negative  [ ] Unable to assess ROS because ________.    MEDICATIONS:  MEDICATIONS  (STANDING):  allopurinol 100 milliGRAM(s) Oral daily  aMIOdarone    Tablet 200 milliGRAM(s) Oral daily  aspirin enteric coated 81 milliGRAM(s) Oral daily  atorvastatin 80 milliGRAM(s) Oral at bedtime  chlorhexidine 2% Cloths 1 Application(s) Topical daily  chlorhexidine 4% Liquid 1 Application(s) Topical <User Schedule>  heparin  Infusion 1200 Unit(s)/Hr (12 mL/Hr) IV Continuous <Continuous>  hydrALAZINE 100 milliGRAM(s) Oral every 8 hours  influenza   Vaccine 0.5 milliLiter(s) IntraMuscular once  insulin lispro (ADMELOG) corrective regimen sliding scale   SubCutaneous at bedtime  insulin lispro (ADMELOG) corrective regimen sliding scale   SubCutaneous three times a day before meals  isosorbide   dinitrate Tablet (ISORDIL) 20 milliGRAM(s) Oral three times a day  lidocaine   4% Patch 1 Patch Transdermal daily  metoprolol succinate ER 25 milliGRAM(s) Oral daily  milrinone Infusion 0.5 MICROgram(s)/kG/Min (11 mL/Hr) IV Continuous <Continuous>  pantoprazole    Tablet 40 milliGRAM(s) Oral before breakfast  Pneumococcal 20 vaccine (Prevnar 20) 0.5 milliLiter(s) 0.5 milliLiter(s) IntraMuscular once    MEDICATIONS  (PRN):  acetaminophen     Tablet .. 650 milliGRAM(s) Oral every 6 hours PRN Temp greater or equal to 38C (100.4F), Mild Pain (1 - 3)  simethicone 80 milliGRAM(s) Chew every 6 hours PRN Gas      ALLERGIES:  Allergies    No Known Allergies    Intolerances        OBJECTIVE:  ICU Vital Signs Last 24 Hrs  T(C): 37.4 (2022 03:00), Max: 38 (2022 21:00)  T(F): 99.3 (2022 03:00), Max: 100.4 (2022 21:00)  HR: 80 (2022 05:00) (80 - 98)  BP: 108/78 (2022 08:00) (108/78 - 108/78)  BP(mean): 89 (2022 08:00) (89 - 89)  ABP: --  ABP(mean): --  RR: 16 (2022 05:00) (16 - 34)  SpO2: 96% (2022 05:00) (91% - 99%)      Adult Advanced Hemodynamics Last 24 Hrs  CVP(mm Hg): 2 (2022 05:00) (-2 - 12)  CVP(cm H2O): --  CO: --  CI: --  PA: 45/18 (2022 05:00) (12/2 - 81/33)  PA(mean): 27 (2022 05:00) (6 - 54)  PCWP: --  SVR: --  SVRI: --  PVR: --  PVRI: --  CAPILLARY BLOOD GLUCOSE      POCT Blood Glucose.: 136 mg/dL (2022 21:14)  POCT Blood Glucose.: 135 mg/dL (2022 15:52)    CAPILLARY BLOOD GLUCOSE      POCT Blood Glucose.: 136 mg/dL (2022 21:14)    I&O's Summary    2022 07:01  -  2022 07:00  --------------------------------------------------------  IN: 657.4 mL / OUT: 2000 mL / NET: -1342.6 mL    2022 07:01  -  2022 06:32  --------------------------------------------------------  IN: 1172.7 mL / OUT: 2650 mL / NET: -1477.3 mL      Daily     Daily Weight in k.2 (2022 05:00)    PHYSICAL EXAMINATION:  General: WN/WD NAD  HEENT: PERRLA, EOMI, moist mucous membranes  Neurology: A&Ox3, nonfocal, BRADFORD x 4  Respiratory: CTA B/L, normal respiratory effort, no wheezes, crackles, rales  CV: RRR, S1S2, no murmurs, rubs or gallops  Abdominal: Soft, NT, ND +BS, Last BM  Extremities: No edema, + peripheral pulses  Incisions:   Tubes:    LABS:                          13.7   13.16 )-----------( 304      ( 2022 04:12 )             41.2     06-07    133<L>  |  103  |  22  ----------------------------<  149<H>  4.5   |  21<L>  |  1.21    Ca    9.1      2022 04:12  Phos  2.8     06-07  Mg     2.1     06-07    TPro  6.3  /  Alb  3.4  /  TBili  0.6  /  DBili  x   /  AST  28  /  ALT  28  /  AlkPhos  55  06-07    LIVER FUNCTIONS - ( 2022 04:12 )  Alb: 3.4 g/dL / Pro: 6.3 g/dL / ALK PHOS: 55 U/L / ALT: 28 U/L / AST: 28 U/L / GGT: x           PT/INR - ( 2022 07:08 )   PT: 12.2 sec;   INR: 1.05 ratio         PTT - ( 2022 23:47 )  PTT:68.2 sec        Urinalysis Basic - ( 2022 00:26 )    Color: Light Yellow / Appearance: Clear / S.014 / pH: x  Gluc: x / Ketone: Negative  / Bili: Negative / Urobili: Negative   Blood: x / Protein: Trace / Nitrite: Negative   Leuk Esterase: Negative / RBC: 1 /hpf / WBC 0 /HPF   Sq Epi: x / Non Sq Epi: 0 /hpf / Bacteria: Negative      Assessment/Plan:  67 y/o male w/ PMHx HTN, HLD, type 2 DM, chronic HFrEF (EF 25-30% by Echo), complete heart block s/p PPM (in , upgraded to BiV-ICD in 2016), CAD (s/p recent BERNABE mid LAD at [facility] on 2022), and stage II CKD (baseline Cr ~1.3) presented with near syncope to OSH found to have 41 episodes of VT on device, s/p unsuccessful VT ablation and transferred to [facility] for management of cardiogenic shock and advanced therapy eval.     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 with low diastolic pulmonary gradient  - bedside nipride study done  with reduction in PVR to <2, still with elevated PA pressures   - SPO2 94-96% on room air     Cardiovascular  # Cardiogenic shock   - TTE : LV 5.2 cm, LVEF 10-15%, LVOT VTI 10 cm, moderate RV dysfunction, mild AI, minimal MR  - [facility] : patent mLAD stent with slow flow, D1 with 40-50% stenosis  - [facility] : RA 12, PA 70/40 (50), PCWP 22, Milana CO/CI 2.3/1.3, MAP 83 with SVR 2469, PVR 12 PERSAUD  - : RA 10, PA 76/35 (49), PCWP 34, PA sat 57% with Milana CO/CI 3.1/1.6, MAP 71 with SVR 1574, PVR 4.8  -  RHC: CVP 17, RV 75/4, PA 80/36, PCW 22, CI 1.88. IABP placed and pt transferred to CICU.   - Maintain Milrinone .5mcg/kg/min   - Cont hydral 100 TID and ISDN 20 TID for AL Reduction, Assisted mean goal 70-80/ SVR <1500 - may consider nipride gtt if oral therapy inefficient   - monitor hemodynamics via swan and perfusion indices   - HF following     # VTach   - s/p EPS on , unable to perform ablation as no substrate found and did not induce VT because of severely low EF   - Interrogation of ICD @Dr Wilson's office : back-to-back episodes of VT @ 200+ bpm all terminating with ATP. Since last cath pt has had 41 VT episodes (all falling into VF zone)  - Normal device function. BIV pacing 97%. No programming changes made  -. c/w Amiodarone  - c/w Toprol 25 daily   - no further VT on inotropes     # CAD   - Chest pain free and compliant with DAPT since last PCI 22  - Cardiac cath @[facility] 22: mLAD 90% s/p BERNABE, LCx mild disease, RCA mild disease s/p diagnostic cardiac cath w/ Dr Wagner on 2022   - Cont ASA and Lipitor   - per discussion with interventional at OSH, ok to DC plavix, last dose     GI  - No active issues  - Cont diabetic diet   - Cont. PPI and bowel regimen, last BM   - s/p colonoscopy 6/3 with 5 polyps removed and biopsied, benign findings- GI recs for 3 yr follow up colo     Renal  # JAGRUTI on CKD II  Admitted w/ Cr 2.01 (baseline Cr ~1.3) was downtrending with inotropic support,  started increasing again to 1.64  - likely i/s/o cardiogenic shock given RHC findings and CI of 1.88  - Avoid nephrotoxic agents, NSAIDs. Renally dose meds.  - monitor strict IOs and continue current cardiac support, repat SCr 4PM post IABP insertion     Heme/Onc  Secondary Polycythemia Vera   - elevated EPO, hgb in normal range  - Heme following peripherally  - f/u MELINA-2     VTE ppx  - Heparin gtt for IABP, low ptt goal     Endo   # T2DM (A1C 6.2 on admission)  - Cont. ISS, glucose controlled, monitor FS closely     MSK  # Gout flare, right knee   - continue allopurinol, s/p prednisone     ID  # [id number] Thrombophlebitis  - RUE US Duplex showing superficial thrombus  - cont ancef 1g q8 -      #Febrile -8 overnight x1   - UA neg, BCx x2 sent. In setting of 1x fever and new lines - maintain and follow fever curve     Lines  - RIJ Baden   - RF IABP

## 2022-06-08 NOTE — PROGRESS NOTE ADULT - ASSESSMENT
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 5/20 with near syncope in setting of worsening HF symptoms and found to have 41 episodes of VT, many terminating with ATP. LHC did not reveal new obstructive CAD and he underwent EPS which did not reveal endocardial substrate amenable for ablation. RHC revealed severely depressed cardiac output and he was transferred to [facility] 5/26 for advanced therapies evaluation.    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 He improved significantly with sequential uptitration of inotropes and increased pacing rate, but on 6/6 he had worsening JAGRUTI. He is s/p RHC which revealed severely elevated filling pressures, severe cpcPH, and CI of 1.9 on milrinone 0.5. IABP was placed and his renal function/PAPs have improved. He is MCS dependent and was inadequately supported with high dose inotropes, so is now listed UNOS status 2e for OHT, awaiting suitable donor. He was febrile overnight but is otherwise stable.    Review of studies  TTE 5/21: LV 5.2 cm, LVEF 10-15%, LVOT VTI 10 cm, moderate RV dysfunction, mild AI, minimal MR  EKG: a-BiV paced  [facility] 5/23: patent mLAD stent with slow flow, D1 with 40-50% stenosis, mild disease otherwise  RHC 5/26: RA 12, PA 70/40 (50), PCWP 22, Milana CO/CI 2.3/1.3, MAP 83 with SVR 2469, PVR 12 PERSAUD    Hemodynamics  5/27 (milrinone 0.25): RA 10, PA 76/35 (49), PCWP 34, PA sat 57% with Milana CO/CI 3.1/1.6, MAP 71 with SVR 1574, PVR 4.8  5/28 (on milrinone 0.375): RA 7, PA 63/31, PCWP 26, PA sat 72.8% with Milana CO/CI 4.9/2.5 (CI later dropped to 1.6 in the afternoon), MAP 70 with SVR 1039, PVR 2.9  5/29 (on milrinone 0.5): RA 8, PA 75/32 (50), PCWP 28, PA sat 74% with Milana CO/CI 5.0/2.6, MAP 77 with SVR 1200, PVR 4.4  5/29 (on milrinone 0.5 and nipride to 3 mcg/kg/min): RA 6, PA 59/31 (40), PCWP 30, PA sat 79% with Milana CO/CI 7.0/3.6, BP 97/57 (MAP 70) with , PVR 1.4  6/6 RHC (mil 0.5): RA 11 (v13), RV 75/17, PA 80/36/52, PCWP 24 (v29), PA sat 59.5%, CO/CI (F) 3.58/1.88  6/7 (mil 0.5, IABP 1:1): CVP 5, PA 66/32/45, PA sat 65.7%, CO/CI (F) 4.0/2.1, SVR 2000  6/8 (mil 0.5, IABP 1:1): CVP 1, PA 46/19/28, PA sat 72.7%, CO/CI (F) 5.6/2.9, SVR 1257

## 2022-06-08 NOTE — PROGRESS NOTE ADULT - SUBJECTIVE AND OBJECTIVE BOX
Subjective:  - febrile overnight (Tmax 100.4)  - resting comfortably in bed with no complaints    Medications:  acetaminophen     Tablet .. 650 milliGRAM(s) Oral every 6 hours PRN  allopurinol 100 milliGRAM(s) Oral daily  aMIOdarone    Tablet 200 milliGRAM(s) Oral daily  aspirin enteric coated 81 milliGRAM(s) Oral daily  atorvastatin 80 milliGRAM(s) Oral at bedtime  chlorhexidine 2% Cloths 1 Application(s) Topical daily  chlorhexidine 4% Liquid 1 Application(s) Topical <User Schedule>  heparin  Infusion 1200 Unit(s)/Hr IV Continuous <Continuous>  hydrALAZINE 100 milliGRAM(s) Oral every 8 hours  influenza   Vaccine 0.5 milliLiter(s) IntraMuscular once  insulin lispro (ADMELOG) corrective regimen sliding scale   SubCutaneous at bedtime  insulin lispro (ADMELOG) corrective regimen sliding scale   SubCutaneous three times a day before meals  isosorbide   dinitrate Tablet (ISORDIL) 40 milliGRAM(s) Oral three times a day  lidocaine   4% Patch 1 Patch Transdermal daily  metoprolol succinate ER 25 milliGRAM(s) Oral daily  milrinone Infusion 0.5 MICROgram(s)/kG/Min IV Continuous <Continuous>  pantoprazole    Tablet 40 milliGRAM(s) Oral before breakfast  Pneumococcal 20 vaccine (Prevnar 20) 0.5 milliLiter(s) 0.5 milliLiter(s) IntraMuscular once  simethicone 80 milliGRAM(s) Chew every 6 hours PRN      ICU Vital Signs Last 24 Hrs  T(C): 37.4, Max: 38 (-07 @ 21:00)  HR: 80 (80 - 116)  BP: 96/59 (96/59 - 96/59)  BP(mean): 84 (72 - 84)  ABP: --  ABP(mean): --  RR: 22 (16 - 25)  SpO2: 92% (91% - 96%)    Weight in k.2 (-)  Weight in k.5 (22)      I&O's Summary Last 24 Hrs    IN: 1305.6 mL / OUT: 3100 mL / NET: -1794.4 mL      Tele: paced 80    Physical Exam:    General: NAD.  HEENT: EOM intact.  Neck: JVP not elevated.  Respiratory: Clear to auscultation bilaterally  CV: Normal S1 and S2. No murmurs, rub, or gallops. Radial and DP pulses normal.  Abdomen: Soft, non-distended  Skin: No skin lesions  Extremities: Warm, no edema  Neurology: Non-focal, alert and oriented times three.   Psych: Affect normal    Labs:    ( 22 @ 06:36 )               13.6   13.85 )--------( 242                  41.3     ( 22 @ 14:52 )     x   |  x   |  x   ---------------------<  x   x   |  x   |  x     Ca x   Phos x   Mg 1.8    ( 22 @ 06:35 )  TPro  6.5  /  Alb  3.0  /  TBili  0.8  /  DBili  x   /  AST  22  /  ALT  24  /  AlkPhos  57  ( 22 @ 04:12 )  TPro  6.3  /  Alb  3.4  /  TBili  0.6  /  DBili  x   /  AST  28  /  ALT  28  /  AlkPhos  55    PTT/PT/INR - ( 22 @ 13:04 )  PTT: 46.8 sec / PT: x     / INR: x        ( 22 @ 20:22 )  TropHS 93    /    / CKMB x      ( 22 @ 16:53 )  TropHS x     /    / CKMB x        Serum Pro-Brain Natriuretic Peptide: 3102 (22 @ 15:23)    Oxygen Saturation, Mixed: 68.0 (22 @ 14:35)  Oxygen Saturation, Mixed: 72.7 (22 @ 06:20)    Blood Gas Venous - Lactate: 1.1 (22 @ 14:35)

## 2022-06-08 NOTE — PROGRESS NOTE ADULT - PROBLEM SELECTOR PLAN 7
- 100.6 fever on 5/28, cultures NGTD  - Tmax 100.4 overnight  - Will d/w transplant ID  - leukocytosis remains elevated, although stable  - right arm concerning for phlebitis, was treated with cefazolin IV  - RUE doppler 6/3 with no evidence of DVT, but did have an occlusive thrombus within the superficial veins (cephalic and basilic)  - BCx NGTD, RVP negative, UA negative

## 2022-06-08 NOTE — PROGRESS NOTE ADULT - ASSESSMENT
67 y/o [place] M PMHx mixed Ischemic/NICM cardiomyopathy (EF 25-30% at baseline, s/p BIV-ICD) and CAD, initially presented to [facility] with near syncope, found to intermittent episodes of VT and admitted to cardiac telemetry for further evaluation/management. S/p unsuccessful VT ablation. Now transferred to [facility] CCU for advanced therapies evaluation. S/p Essex placement in CCU, course c/b fever on 5/28, s/p subsequent [place] removal. Blood cultures no growth, no further fevers.     Last fever 100.6 (5/28)  Blood Cultures (5/28) no growth  Urinalysis unremarkable  CT Chest (5/28) mild pulmonary edema    Immunizations:  COVID Pfizer x2 (10/2021)    Pre-Transplant Labs:  HAV IgG+  HBsAb-, HBsAg-, HBcAb-   HCV Ab-  HSV 1/2 IgG +/-  EBV negative  CMV IgG+  Toxoplasma IgG-  VZV IgG+  Measles IgG+  Mumps IgG+   Rubella IgG-  Quantiferon Gold negative  HIV Test negative  Syphilis Screen negative  Strongyloides Ab negative    Impression:  #RUE Thrombophlebitis  #Fever - in setting of central line, now resolved s/p removal. Blood cultures no growth  #Leukocytosis - 2/2 steroids vs phlebitis (improved)  #Pre-Advanced Therapies Eval    Recs:  Developed fevers overnight- non toxic appearing  CXR=NAD, no dysuria or cough, phlebitis RUE without tenderness at this point  - would send blood cultures x2 sets  -would remove Braydon catheter if feasible  -would check RVP  - If he decompensates: start antibiotics with Vancomycin 1gm q 12hr plus cefepime 1 gm q 8hr  - would defer transplant listing until fevers resolve    Health maintenance:  - s/p first dose of hep B vaccine on 6/3/22  - would administer Prevnar 20 vaccine dose once fever resolves      Reggie Escalante MD  Can be called via Teams  After 5pm/weekends [phone]

## 2022-06-08 NOTE — CHART NOTE - NSCHARTNOTEFT_GEN_A_CORE
I was made aware by the RN the pt spiked a first time fever of 100.4. I called Dr Canales and we decided to send a full infectious workup. No plan to remove central access for now but will reevaluate.   Also notified Dr Leonardo, aware of plan.     Shavonne Harper PA-C

## 2022-06-08 NOTE — PROGRESS NOTE ADULT - SUBJECTIVE AND OBJECTIVE BOX
INFECTIOUS DISEASES FOLLOW UP-- Arabella Escalante  [phone]    This is a follow up note for this  66yMale with  Cardiomyopathy  febrile with a bump in WBC but non toxic appearing        ROS:  CONSTITUTIONAL:  x1 fever, good appetite  CARDIOVASCULAR:  No chest pain or palpitations  RESPIRATORY:  No dyspnea  GASTROINTESTINAL:  No nausea, vomiting, diarrhea, or abdominal pain  GENITOURINARY:  No dysuria  NEUROLOGIC:  No headache,     Allergies    No Known Allergies    Intolerances        ANTIBIOTICS/RELEVANT:  antimicrobials    immunologic:  influenza   Vaccine 0.5 milliLiter(s) IntraMuscular once    OTHER:  acetaminophen     Tablet .. 650 milliGRAM(s) Oral every 6 hours PRN  allopurinol 100 milliGRAM(s) Oral daily  aMIOdarone    Tablet 200 milliGRAM(s) Oral daily  aspirin enteric coated 81 milliGRAM(s) Oral daily  atorvastatin 80 milliGRAM(s) Oral at bedtime  chlorhexidine 2% Cloths 1 Application(s) Topical daily  chlorhexidine 4% Liquid 1 Application(s) Topical <User Schedule>  heparin  Infusion 1200 Unit(s)/Hr IV Continuous <Continuous>  hydrALAZINE 100 milliGRAM(s) Oral every 8 hours  insulin lispro (ADMELOG) corrective regimen sliding scale   SubCutaneous at bedtime  insulin lispro (ADMELOG) corrective regimen sliding scale   SubCutaneous three times a day before meals  isosorbide   dinitrate Tablet (ISORDIL) 40 milliGRAM(s) Oral three times a day  lidocaine   4% Patch 1 Patch Transdermal daily  metoprolol succinate ER 25 milliGRAM(s) Oral daily  milrinone Infusion 0.5 MICROgram(s)/kG/Min IV Continuous <Continuous>  pantoprazole    Tablet 40 milliGRAM(s) Oral before breakfast  simethicone 80 milliGRAM(s) Chew every 6 hours PRN      Objective:  Vital Signs Last 24 Hrs  T(C): 37.9 (2022 16:00), Max: 38 (2022 21:00)  T(F): 100.2 (2022 16:00), Max: 100.4 (2022 21:00)  HR: 84 (2022 17:00) (80 - 116)  BP: 96/59 (2022 08:00) (96/59 - 96/59)  BP(mean): 84 (2022 14:30) (72 - 84)  RR: 31 (2022 17:00) (16 - 31)  SpO2: 93% (2022 17:00) (91% - 96%)    PHYSICAL EXAM:  Constitutional:no acute distress  Eyes:JOHNY, EOMI  Ear/Nose/Throat: no oral lesions, right IJ Braydon site CDI	  Respiratory: clear BL  Cardiovascular: S1S2  Gastrointestinal:soft, (+) BS, no tenderness  Extremities:no e/e/c  IABP RLE site CDI  No Lymphadenopathy  IV sites not inflammed.    LABS:                        13.6   13.85 )-----------( 242      ( 2022 06:36 )             41.3     06-08    135  |  103  |  16  ----------------------------<  118<H>  4.7   |  19<L>  |  1.12    Ca    9.2      2022 06:35  Phos  3.1     06-08  Mg     1.8     06-08    TPro  6.5  /  Alb  3.0<L>  /  TBili  0.8  /  DBili  x   /  AST  22  /  ALT  24  /  AlkPhos  57  06-08    PT/INR - ( 2022 06:37 )   PT: 13.5 sec;   INR: 1.16 ratio         PTT - ( 2022 13:04 )  PTT:46.8 sec  Urinalysis Basic - ( 2022 00:26 )    Color: Light Yellow / Appearance: Clear / S.014 / pH: x  Gluc: x / Ketone: Negative  / Bili: Negative / Urobili: Negative   Blood: x / Protein: Trace / Nitrite: Negative   Leuk Esterase: Negative / RBC: 1 /hpf / WBC 0 /HPF   Sq Epi: x / Non Sq Epi: 0 /hpf / Bacteria: Negative        MICROBIOLOGY:            RECENT CULTURES:   @ 15:54  .Blood Blood-Peripheral  --  --  --    No Growth Final  --   @ 15:40  .Blood Blood-Peripheral  --  --  --    No Growth Final  --      RADIOLOGY & ADDITIONAL STUDIES:  < from: Xray Chest 1 View- PORTABLE-Urgent (Xray Chest 1 View- PORTABLE-Urgent .) (22 @ 08:38) >  FINDINGS:  Right IJ approach [place]-Serafin catheter tip in the right interlobar   pulmonary artery.  Left chest wall AICD.  Intra-aortic balloon pump marker at the level of the AP window.  Heart size and mediastinum not well assessed on this projection.  The lungs are clear.  There is no pneumothorax or pleural effusion.    IMPRESSION:  Right IJ approach [place]-Serafin catheter with its tip in the right interlobar   pulmonary artery.    < end of copied text >

## 2022-06-08 NOTE — PROGRESS NOTE ADULT - SUBJECTIVE AND OBJECTIVE BOX
Behavioral Cardiology Progress Note     History of present illness: Mr. Du is a 66-year-old male w/PMHx of HTN, HLD, type 2 DM, chronic HFrEF (25-30%), complete heart block s/p PPM (in , upgraded to BiV-ICD in 2016), CAD (s/p recent BERNABE mid LAD at [facility] on 2022), and stage II CKD), admitted for intermittent vtach, now s/p unsuccessful VT ablation. Patient transferred from [facility] to [facility] for further management and evaluation for LVAD vs. OHT.     Social history: Patient lives with his wife (Teresa, 66 y/o, [phone]) and 24 y/o daughter (Priyanka) in [place], [place], in private home. Patient has two other adult sons (Philip, age 35; and Aldo, age 30) who live in [place] and [place]. Patient’s mother  in her 80s of natural causes and father  in his 80s from a heart attack. Patient has four siblings who live in the area and who he is close to. Patient is a citizen, [place] from [facility] approximately 40 years ago. 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 Education: high school.    Substance use:   Tobacco: Patient started smoking approximately at age 21, stopped in  at time of heart block. During that time, he smoked approximately 1-2 cigarettes/day   Alcohol: Reported no current alcohol use, last time he used alcohol was prior to 2021. Before that time, he used to drink approximately 4 beers at a time, 2-3 times/week.   Drug: Denies current or past substance use.

## 2022-06-08 NOTE — PROGRESS NOTE ADULT - ASSESSMENT
Past psychiatric history: Denied.      Psychological assessment: Feels he is coping well with need to remain hospitalized until he matches for heart transplant. During the day, passes the time by watching TV, talking to family members, and doing crossword puzzles. At night wakes several times but is able to go back to sleep without difficulty. Appetite good. Describes his mood as "good." Denies symptoms of anxiety or depression. Provided supportive therapy.     Met with his family when they came to visit. His wife, Teresa (cell, [phone]) is a retired RN and his primary support person. She will be available to provide support as needed. Also visiting was his daughter, Tess who works for the health system in [place].    Mental Status Exam: Seen resting in bed in CICU. Alert, oriented x4. Appropriate eye contact. Speech normal volume and rate. Thought process was logical and goal-oriented, content appropriate to conversation. No evidence of jack, delusions, psychosis. Denies SI/HI. Mood is euthymic. Affect is full range. Insight into disease is good, immediate judgment is adequate.     Dx: Adjustment disorder, unspecified. F43.20. Systolic heart failure, I50.2.    Recommendations:   Behavioral Cardiology will follow as needed.    service  Would benefit from additional education on heart failure guidelines, as well as LVAD and HT  Would benefit from involving family members in communication regarding medical status and recommendations  Patient would benefit from continued involvement from behavioral cardiology    38 minutes spent on patient encounter

## 2022-06-08 NOTE — PROGRESS NOTE ADULT - SUBJECTIVE AND OBJECTIVE BOX
GOCOOL, LENNOX  MRN-[id number]  Patient is a 66y old  Male who presents with a chief complaint of LVAD/OHT eval (2022 17:59)    HPI:  66M Mixed Ischemic/NICM Cardiomyopathy (EF 25-30% at baseline, s/p BIV-ICD), CAD (medically managed MIs in ,, Most recent stent in 2022 to mLAD) presented with multiple near syncope, found to have 41 episodes of VT and admitted initially to cardiac telemetry for further evaluation/management. Ischemic evaluation without new disease and VT ablation without substrate to complete ablation.   Transferred from [facility] for further management and evaluation for LVAD vs OHT.    (26 May 2022 20:31)      24 hr events: no acute events, swan d/c'd in setting of fevers.     REVIEW OF SYSTEMS:    CONSTITUTIONAL: No weakness, fevers or chills  EYES/ENT: No visual changes;  No vertigo or throat pain   NECK: No pain or stiffness  RESPIRATORY: No cough, wheezing, hemoptysis; No shortness of breath  CARDIOVASCULAR: No chest pain or palpitations  GASTROINTESTINAL: No abdominal or epigastric pain. No nausea, vomiting, or hematemesis; No diarrhea or constipation. No melena or hematochezia.  GENITOURINARY: No dysuria, frequency or hematuria  NEUROLOGICAL: No numbness or weakness  SKIN: No itching, rashes      Physical Exam:  Vital Signs Last 24 Hrs  T(C): 37.2 (2022 19:00), Max: 38 (2022 21:00)  T(F): 98.9 (2022 19:00), Max: 100.4 (2022 21:00)  HR: 80 (2022 19:00) (80 - 116)  BP: 96/59 (2022 08:00) (96/59 - 96/59)  BP(mean): 84 (2022 14:30) (72 - 84)  RR: 26 (2022 19:00) (16 - 31)  SpO2: 94% (2022 19:00) (91% - 96%)    ============================I/O===========================   I&O's Detail    2022 07:01  -  2022 07:00  --------------------------------------------------------  IN:    Heparin: 294.5 mL    IV PiggyBack: 50 mL    Milrinone: 251.1 mL    Oral Fluid: 710 mL  Total IN: 1305.6 mL    OUT:    Voided (mL): 3100 mL  Total OUT: 3100 mL    Total NET: -1794.4 mL      2022 07:01  -  2022 19:29  --------------------------------------------------------  IN:    Heparin: 134.5 mL    IV PiggyBack: 50 mL    Milrinone: 119.9 mL    Oral Fluid: 600 mL  Total IN: 904.4 mL    OUT:    Voided (mL): 1175 mL  Total OUT: 1175 mL    Total NET: -270.6 mL        ============================ LABS =========================                        13.6   13.85 )-----------( 242      ( 2022 06:36 )             41.3     06-08    135  |  103  |  16  ----------------------------<  118<H>  4.7   |  19<L>  |  1.12    Ca    9.2      2022 06:35  Phos  3.1     06-08  Mg     1.8     06-08    TPro  6.5  /  Alb  3.0<L>  /  TBili  0.8  /  DBili  x   /  AST  22  /  ALT  24  /  AlkPhos  57  06-08    LIVER FUNCTIONS - ( 2022 06:35 )  Alb: 3.0 g/dL / Pro: 6.5 g/dL / ALK PHOS: 57 U/L / ALT: 24 U/L / AST: 22 U/L / GGT: x           PT/INR - ( 2022 06:37 )   PT: 13.5 sec;   INR: 1.16 ratio         PTT - ( 2022 13:04 )  PTT:46.8 sec    Urinalysis Basic - ( 2022 00:26 )    Color: Light Yellow / Appearance: Clear / S.014 / pH: x  Gluc: x / Ketone: Negative  / Bili: Negative / Urobili: Negative   Blood: x / Protein: Trace / Nitrite: Negative   Leuk Esterase: Negative / RBC: 1 /hpf / WBC 0 /HPF   Sq Epi: x / Non Sq Epi: 0 /hpf / Bacteria: Negative      ======================Micro/Rad/Cardio=================  Culture: Reviewed   CXR: Reviewed  Echo:Reviewed  ======================================================  PAST MEDICAL & SURGICAL HISTORY:  AV block      Essential hypertension      Chronic HFrEF (heart failure with reduced ejection fraction)      Chronic kidney disease, unspecified CKD stage      CAD (coronary artery disease)      HLD (hyperlipidemia)      Type 2 diabetes mellitus      Artificial cardiac pacemaker        ====================ASSESSMENT ==============  65 y/o male w/ PMHx HTN, HLD, type 2 DM, chronic HFrEF (EF 25-30% by Echo), complete heart block s/p PPM (in , upgraded to BiV-ICD in 2016), CAD (s/p recent BERNABE mid LAD at [facility] on 2022), and stage II CKD (baseline Cr ~1.3) presented with near syncope to OSH found to have 41 episodes of VT on device, s/p unsuccessful VT ablation and transferred to [facility] for management of cardiogenic shock and advanced therapy eval.     Plan:  ====================== NEUROLOGY=====================  - AAOx3  - No active issues    acetaminophen     Tablet .. 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 low diastolic pulmonary gradient  - bedside nipride study done  with reduction in PVR to <2, still with elevated PA pressures   - SPO2 94-96% on room air     ====================CARDIOVASCULAR==================  # Cardiogenic shock   - TTE : LV 5.2 cm, LVEF 10-15%, LVOT VTI 10 cm, moderate RV dysfunction, mild AI, minimal MR  - LHC : patent mLAD stent with slow flow, D1 with 40-50% stenosis  - RHC : RA 12, PA 70/40 (50), PCWP 22, Milana CO/CI 2.3/1.3, MAP 83 with SVR 2469, PVR 12 PERSAUD  - : RA 10, PA 76/35 (49), PCWP 34, PA sat 57% with Milana CO/CI 3.1/1.6, MAP 71 with SVR 1574, PVR 4.8  - 6/6 RHC: CVP 17, RV 75/4, PA 80/36, PCW 22, CI 1.88. IABP placed and pt transferred to CICU.   - Maintain Milrinone .5mcg/kg/min   - Cont hydral 100 TID and ISDN 40TID for AL Reduction, Assisted mean goal 70-80   - monitor hemodynamics and perfusion indices   - HF following     # VTach   - s/p EPS on , unable to perform ablation as no substrate found and did not induce VT because of severely low EF   - Interrogation of ICD @Dr Wilson's office : back-to-back episodes of VT @ 200+ bpm all terminating with ATP. Since last cath pt has had 41 VT episodes (all falling into VF zone)  - Normal device function. BIV pacing 97%. No programming changes made  -. c/w Amiodarone  - c/w Toprol 25 daily   - pending OHT listing   - no further VT on inotropes     # CAD   - Chest pain free and compliant with DAPT since last PCI 22  - Cardiac cath @[facility] 22: mLAD 90% s/p BERNABE, LCx mild disease, RCA mild disease s/p diagnostic cardiac cath w/ Dr Wagner on 2022   - Cont ASA and Lipitor   - per discussion with interventional at OSH, ok to DC plavix, last dose     aMIOdarone    Tablet 200 milliGRAM(s) Oral daily  hydrALAZINE 100 milliGRAM(s) Oral every 8 hours  isosorbide   dinitrate Tablet (ISORDIL) 20 milliGRAM(s) Oral three times a day  metoprolol succinate ER 25 milliGRAM(s) Oral daily  milrinone Infusion 0.5 MICROgram(s)/kG/Min (11 mL/Hr) IV Continuous <Continuous>    ===================HEMATOLOGIC/ONC ===================  Secondary Polycythemia Vera   - elevated EPO, hgb in normal range  - Heme following peripherally  - f/u MELINA-2     VTE ppx  - Heparin gtt for IABP, low ptt goal     aspirin enteric coated 81 milliGRAM(s) Oral daily  heparin  Infusion 1200 Unit(s)/Hr (12.5 mL/Hr) IV Continuous <Continuous>    ===================== RENAL =========================  # JAGRUTI on CKD II  Admitted w/ Cr 2.01 (baseline Cr ~1.3) was downtrending with inotropic support, / started increasing again to 1.64  - likely i/s/o cardiogenic shock given RHC findings and CI of 1.88  - Avoid nephrotoxic agents, NSAIDs. Renally dose meds.  - monitor strict IOs and continue current cardiac support, repat SCr 4PM post IABP insertion       ==================== GASTROINTESTINAL===================  - No active issues  - Cont diabetic diet   - Cont. PPI and bowel regimen, last BM   - s/p colonoscopy 6/3 with 5 polyps removed and biopsied, benign findings- GI recs for 3 yr follow up colo       pantoprazole    Tablet 40 milliGRAM(s) Oral before breakfast  simethicone 80 milliGRAM(s) Chew every 6 hours PRN Gas    =======================    ENDOCRINE  =====================  # T2DM (A1C 6.2 on admission)  - Cont. ISS, glucose controlled, monitor FS closely       allopurinol 100 milliGRAM(s) Oral daily  atorvastatin 80 milliGRAM(s) Oral at bedtime  insulin lispro (ADMELOG) corrective regimen sliding scale   SubCutaneous at bedtime  insulin lispro (ADMELOG) corrective regimen sliding scale   SubCutaneous three times a day before meals    ========================INFECTIOUS DISEASE================  # RUE Thrombophlebitis  - RUE US Duplex showing superficial thrombus  - cont ancef 1g q8 - 6/7     Febrile /-8 overnight x1   - UA neg, BCx x2 sent. In setting of 1x fever and new lines - maintain and follow fever curve  - d/c'd swan      =======================MSK=============================  # Gout flare, right knee   - continue allopurinol, s/p prednisone         Patient requires continuous monitoring with bedside rhythm monitoring, pulse ox monitoring, and intermittent blood gas analysis. Care plan discussed with ICU care team. Patient remained critical and at risk for life threatening decompensation.  Patient seen, examined and plan discussed with CCU team during rounds.     I have personally provided 35 minutes of critical care time excluding time spent on separate procedures, in addition to initial critical care time provided by the CICU Attending, Dr. Leonardo

## 2022-06-09 LAB
% GAMMA, URINE: 10.1 % — SIGNIFICANT CHANGE UP
ALBUMIN 24H MFR UR ELPH: 14.6 % — SIGNIFICANT CHANGE UP
ALBUMIN SERPL ELPH-MCNC: 3.1 G/DL — LOW (ref 3.3–5)
ALP SERPL-CCNC: 64 U/L — SIGNIFICANT CHANGE UP (ref 40–120)
ALPHA1 GLOB 24H MFR UR ELPH: 48.5 % — SIGNIFICANT CHANGE UP
ALPHA2 GLOB 24H MFR UR ELPH: 16.8 % — SIGNIFICANT CHANGE UP
ALT FLD-CCNC: 26 U/L — SIGNIFICANT CHANGE UP (ref 10–45)
AMPHET UR-MCNC: NEGATIVE — SIGNIFICANT CHANGE UP
ANION GAP SERPL CALC-SCNC: 13 MMOL/L — SIGNIFICANT CHANGE UP (ref 5–17)
APTT BLD: 53 SEC — HIGH (ref 27.5–35.5)
AST SERPL-CCNC: 21 U/L — SIGNIFICANT CHANGE UP (ref 10–40)
B-GLOBULIN 24H MFR UR ELPH: 10 % — SIGNIFICANT CHANGE UP
BARBITURATES, URINE.: NEGATIVE — SIGNIFICANT CHANGE UP
BASOPHILS # BLD AUTO: 0.09 K/UL — SIGNIFICANT CHANGE UP (ref 0–0.2)
BASOPHILS NFR BLD AUTO: 0.6 % — SIGNIFICANT CHANGE UP (ref 0–2)
BENZODIAZ UR-MCNC: NEGATIVE — SIGNIFICANT CHANGE UP
BILIRUB SERPL-MCNC: 0.7 MG/DL — SIGNIFICANT CHANGE UP (ref 0.2–1.2)
BLD GP AB SCN SERPL QL: NEGATIVE — SIGNIFICANT CHANGE UP
BUN SERPL-MCNC: 20 MG/DL — SIGNIFICANT CHANGE UP (ref 7–23)
CALCIUM SERPL-MCNC: 9.1 MG/DL — SIGNIFICANT CHANGE UP (ref 8.4–10.5)
CHLORIDE SERPL-SCNC: 102 MMOL/L — SIGNIFICANT CHANGE UP (ref 96–108)
CO2 SERPL-SCNC: 22 MMOL/L — SIGNIFICANT CHANGE UP (ref 22–31)
COCAINE METAB.OTHER UR-MCNC: NEGATIVE — SIGNIFICANT CHANGE UP
COLLECT DURATION TIME UR: 24 HR — SIGNIFICANT CHANGE UP
CREAT SERPL-MCNC: 1.29 MG/DL — SIGNIFICANT CHANGE UP (ref 0.5–1.3)
CREATININE, URINE THERAPEUTIC: 90.8 MG/DL — SIGNIFICANT CHANGE UP
EGFR: 62 ML/MIN/1.73M2 — SIGNIFICANT CHANGE UP
EOSINOPHIL # BLD AUTO: 0.52 K/UL — HIGH (ref 0–0.5)
EOSINOPHIL NFR BLD AUTO: 3.5 % — SIGNIFICANT CHANGE UP (ref 0–6)
GLUCOSE BLDC GLUCOMTR-MCNC: 120 MG/DL — HIGH (ref 70–99)
GLUCOSE BLDC GLUCOMTR-MCNC: 121 MG/DL — HIGH (ref 70–99)
GLUCOSE BLDC GLUCOMTR-MCNC: 134 MG/DL — HIGH (ref 70–99)
GLUCOSE BLDC GLUCOMTR-MCNC: 149 MG/DL — HIGH (ref 70–99)
GLUCOSE SERPL-MCNC: 159 MG/DL — HIGH (ref 70–99)
HCT VFR BLD CALC: 40.5 % — SIGNIFICANT CHANGE UP (ref 39–50)
HGB BLD-MCNC: 13.4 G/DL — SIGNIFICANT CHANGE UP (ref 13–17)
IMM GRANULOCYTES NFR BLD AUTO: 0.7 % — SIGNIFICANT CHANGE UP (ref 0–1.5)
INR BLD: 1.15 RATIO — SIGNIFICANT CHANGE UP (ref 0.88–1.16)
INTERPRETATION 24H UR IFE-IMP: SIGNIFICANT CHANGE UP
INTERPRETATION 24H UR IFE-IMP: SIGNIFICANT CHANGE UP
LACTATE BLDV-MCNC: 1.3 MMOL/L — SIGNIFICANT CHANGE UP (ref 0.7–2)
LYMPHOCYTES # BLD AUTO: 17.8 % — SIGNIFICANT CHANGE UP (ref 13–44)
LYMPHOCYTES # BLD AUTO: 2.62 K/UL — SIGNIFICANT CHANGE UP (ref 1–3.3)
M PROTEIN 24H UR ELPH-MRATE: 0 MG/24HR — SIGNIFICANT CHANGE UP (ref 0–0)
M PROTEIN 24H UR ELPH-MRATE: 0 MG/DL — SIGNIFICANT CHANGE UP
MAGNESIUM SERPL-MCNC: 1.9 MG/DL — SIGNIFICANT CHANGE UP (ref 1.6–2.6)
MCHC RBC-ENTMCNC: 30 PG — SIGNIFICANT CHANGE UP (ref 27–34)
MCHC RBC-ENTMCNC: 33.1 GM/DL — SIGNIFICANT CHANGE UP (ref 32–36)
MCV RBC AUTO: 90.8 FL — SIGNIFICANT CHANGE UP (ref 80–100)
METHADONE UR-MCNC: NEGATIVE — SIGNIFICANT CHANGE UP
METHAQUALONE UR QL: NEGATIVE — SIGNIFICANT CHANGE UP
METHAQUALONE UR-MCNC: NEGATIVE — SIGNIFICANT CHANGE UP
MONOCYTES # BLD AUTO: 1.5 K/UL — HIGH (ref 0–0.9)
MONOCYTES NFR BLD AUTO: 10.2 % — SIGNIFICANT CHANGE UP (ref 2–14)
NEUTROPHILS # BLD AUTO: 9.87 K/UL — HIGH (ref 1.8–7.4)
NEUTROPHILS NFR BLD AUTO: 67.2 % — SIGNIFICANT CHANGE UP (ref 43–77)
NRBC # BLD: 0 /100 WBCS — SIGNIFICANT CHANGE UP (ref 0–0)
OPIATES UR-MCNC: NEGATIVE — SIGNIFICANT CHANGE UP
PCP UR-MCNC: NEGATIVE — SIGNIFICANT CHANGE UP
PHOSPHATE SERPL-MCNC: 2.9 MG/DL — SIGNIFICANT CHANGE UP (ref 2.5–4.5)
PLATELET # BLD AUTO: 252 K/UL — SIGNIFICANT CHANGE UP (ref 150–400)
POTASSIUM SERPL-MCNC: 4.5 MMOL/L — SIGNIFICANT CHANGE UP (ref 3.5–5.3)
POTASSIUM SERPL-SCNC: 4.5 MMOL/L — SIGNIFICANT CHANGE UP (ref 3.5–5.3)
PROPOXYPH UR QL: NEGATIVE — SIGNIFICANT CHANGE UP
PROT ?TM UR-MCNC: 11 MG/DL — SIGNIFICANT CHANGE UP (ref 0–12)
PROT PATTERN 24H UR ELPH-IMP: SIGNIFICANT CHANGE UP
PROT SERPL-MCNC: 6.6 G/DL — SIGNIFICANT CHANGE UP (ref 6–8.3)
PROTEIN QUANT CALC, URINE: 245 MG/24 H — HIGH (ref 50–100)
PROTHROM AB SERPL-ACNC: 13.2 SEC — SIGNIFICANT CHANGE UP (ref 10.5–13.4)
RBC # BLD: 4.46 M/UL — SIGNIFICANT CHANGE UP (ref 4.2–5.8)
RBC # FLD: 15.9 % — HIGH (ref 10.3–14.5)
RH IG SCN BLD-IMP: POSITIVE — SIGNIFICANT CHANGE UP
SODIUM SERPL-SCNC: 137 MMOL/L — SIGNIFICANT CHANGE UP (ref 135–145)
THC UR QL: NEGATIVE — SIGNIFICANT CHANGE UP
TOTAL VOLUME - 24 HOUR: 2225 ML — SIGNIFICANT CHANGE UP
URINE CREATININE CALCULATION: 0.7 G/24 H — LOW (ref 1–2)
WBC # BLD: 14.71 K/UL — HIGH (ref 3.8–10.5)
WBC # FLD AUTO: 14.71 K/UL — HIGH (ref 3.8–10.5)

## 2022-06-09 PROCEDURE — 99292 CRITICAL CARE ADDL 30 MIN: CPT

## 2022-06-09 PROCEDURE — 71045 X-RAY EXAM CHEST 1 VIEW: CPT | Mod: 26

## 2022-06-09 PROCEDURE — 99291 CRITICAL CARE FIRST HOUR: CPT | Mod: 25

## 2022-06-09 PROCEDURE — 99292 CRITICAL CARE ADDL 30 MIN: CPT | Mod: 25

## 2022-06-09 PROCEDURE — 99291 CRITICAL CARE FIRST HOUR: CPT

## 2022-06-09 RX ORDER — MAGNESIUM SULFATE 500 MG/ML
1 VIAL (ML) INJECTION ONCE
Refills: 0 | Status: COMPLETED | OUTPATIENT
Start: 2022-06-09 | End: 2022-06-09

## 2022-06-09 RX ORDER — POLYETHYLENE GLYCOL 3350 17 G/17G
17 POWDER, FOR SOLUTION ORAL DAILY
Refills: 0 | Status: DISCONTINUED | OUTPATIENT
Start: 2022-06-09 | End: 2022-06-21

## 2022-06-09 RX ORDER — SENNA PLUS 8.6 MG/1
1 TABLET ORAL AT BEDTIME
Refills: 0 | Status: DISCONTINUED | OUTPATIENT
Start: 2022-06-09 | End: 2022-06-21

## 2022-06-09 RX ADMIN — POLYETHYLENE GLYCOL 3350 17 GRAM(S): 17 POWDER, FOR SOLUTION ORAL at 05:01

## 2022-06-09 RX ADMIN — PANTOPRAZOLE SODIUM 40 MILLIGRAM(S): 20 TABLET, DELAYED RELEASE ORAL at 05:01

## 2022-06-09 RX ADMIN — AMIODARONE HYDROCHLORIDE 200 MILLIGRAM(S): 400 TABLET ORAL at 05:01

## 2022-06-09 RX ADMIN — ISOSORBIDE DINITRATE 40 MILLIGRAM(S): 5 TABLET ORAL at 11:06

## 2022-06-09 RX ADMIN — Medication 100 GRAM(S): at 05:02

## 2022-06-09 RX ADMIN — LIDOCAINE 1 PATCH: 4 CREAM TOPICAL at 22:47

## 2022-06-09 RX ADMIN — Medication 100 MILLIGRAM(S): at 21:14

## 2022-06-09 RX ADMIN — Medication 100 MILLIGRAM(S): at 05:01

## 2022-06-09 RX ADMIN — Medication 81 MILLIGRAM(S): at 11:07

## 2022-06-09 RX ADMIN — SENNA PLUS 1 TABLET(S): 8.6 TABLET ORAL at 21:15

## 2022-06-09 RX ADMIN — LIDOCAINE 1 PATCH: 4 CREAM TOPICAL at 11:07

## 2022-06-09 RX ADMIN — CHLORHEXIDINE GLUCONATE 1 APPLICATION(S): 213 SOLUTION TOPICAL at 05:36

## 2022-06-09 RX ADMIN — MILRINONE LACTATE 11 MICROGRAM(S)/KG/MIN: 1 INJECTION, SOLUTION INTRAVENOUS at 18:40

## 2022-06-09 RX ADMIN — ISOSORBIDE DINITRATE 40 MILLIGRAM(S): 5 TABLET ORAL at 05:02

## 2022-06-09 RX ADMIN — Medication 10 MILLIGRAM(S): at 16:13

## 2022-06-09 RX ADMIN — MILRINONE LACTATE 11 MICROGRAM(S)/KG/MIN: 1 INJECTION, SOLUTION INTRAVENOUS at 05:02

## 2022-06-09 RX ADMIN — Medication 100 MILLIGRAM(S): at 13:46

## 2022-06-09 RX ADMIN — Medication 25 MILLIGRAM(S): at 05:02

## 2022-06-09 RX ADMIN — ATORVASTATIN CALCIUM 80 MILLIGRAM(S): 80 TABLET, FILM COATED ORAL at 21:15

## 2022-06-09 RX ADMIN — LIDOCAINE 1 PATCH: 4 CREAM TOPICAL at 19:37

## 2022-06-09 RX ADMIN — Medication 100 MILLIGRAM(S): at 11:07

## 2022-06-09 RX ADMIN — MILRINONE LACTATE 11 MICROGRAM(S)/KG/MIN: 1 INJECTION, SOLUTION INTRAVENOUS at 21:17

## 2022-06-09 RX ADMIN — ISOSORBIDE DINITRATE 40 MILLIGRAM(S): 5 TABLET ORAL at 17:40

## 2022-06-09 NOTE — PROGRESS NOTE ADULT - PROBLEM SELECTOR PLAN 7
- 100.6 fever on 5/28, cultures NGTD  - Tmax 100.4 overnight  - Will d/w transplant ID  - leukocytosis remains elevated, although stable  - right arm concerning for phlebitis, was treated with cefazolin IV  - RUE doppler 6/3 with no evidence of DVT, but did have an occlusive thrombus within the superficial veins (cephalic and basilic)  - BCx NGTD, RVP negative, UA negative - 100.6 fever on 5/28, cultures NGTD  - Tmax 100.4 on 6/7   - appreciate transplant ID recs, fever  possibly due to RUE occlusive thrombus seen in right cephalic and basilic veins as seen on [facility] duplex performed on 6/3, possible thrombophlebitis, was given Ancef 6/2-6/7   - leukocytosis remains elevated, increase from 13.85 --> 14.71   - RUE doppler 6/3 with no evidence of DVT, but did have an occlusive thrombus within the superficial veins (cephalic and basilic)  - BCx NGTD (6/7), RVP negative, UA negative  -per transplant ID, - "If he decompensates: start antibiotics with Vancomycin 1gm q 12hr plus cefepime 1 gm q 8hr  - would defer transplant listing until fevers resolve" - 100.6 fever on 5/28, cultures NGTD  - Tmax 100.4 on 6/7   - appreciate transplant ID recs, fever  possibly due to RUE occlusive thrombus seen in right cephalic and basilic veins as seen on [facility] duplex performed on 6/3, possible thrombophlebitis, was given Ancef 6/2-6/7   - leukocytosis remains elevated, increase from 13.85 --> 14.71   - RUE doppler 6/3 with no evidence of DVT, but did have an occlusive thrombus within the superficial veins (cephalic and basilic)  - BCx NGTD (6/7), RVP negative, UA negative  -per transplant ID, - "If he decompensates: start antibiotics with Vancomycin 1gm q 12hr plus cefepime 1 gm q 8hr  - would defer transplant listing until fevers resolve"  -will speak with transplant ID

## 2022-06-09 NOTE — PROGRESS NOTE ADULT - SUBJECTIVE AND OBJECTIVE BOX
GOCOOL, LENNOX  [id number]  Patient is a 66y old  Male who presents with a chief complaint of LVAD/OHT eval (2022 23:06)    HPI:  66M Mixed Ischemic/NICM Cardiomyopathy (EF 25-30% at baseline, s/p BIV-ICD), CAD (medically managed MIs in ,, Most recent stent in 2022 to mLAD) presented with multiple near syncope, found to have 41 episodes of VT and admitted initially to cardiac telemetry for further evaluation/management. Ischemic evaluation without new disease and VT ablation without substrate to complete ablation.   Transferred from [facility] for further management and evaluation for LVAD vs OHT.    (26 May 2022 20:31)      Overnight events:  - c/o L knee pain, likely gout pain    REVIEW OF SYSTEMS:    CONSTITUTIONAL: No weakness, fevers or chills  EYES/ENT: No visual changes;  No vertigo or throat pain   NECK: No pain or stiffness  RESPIRATORY: No cough, wheezing, hemoptysis; No shortness of breath  CARDIOVASCULAR: No chest pain or palpitations  GASTROINTESTINAL: No abdominal or epigastric pain. No nausea, vomiting, or hematemesis; No diarrhea or constipation. No melena or hematochezia.  GENITOURINARY: No dysuria, frequency or hematuria  NEUROLOGICAL: No numbness or weakness  SKIN: No itching, rashes      Physical Exam:  Vital Signs Last 24 Hrs  T(C): 36.9 (2022 07:00), Max: 37.9 (2022 16:00)  T(F): 98.5 (2022 07:00), Max: 100.2 (2022 16:00)  HR: 81 (2022 07:00) (80 - 116)  BP: --  BP(mean): 84 (2022 14:30) (84 - 84)  RR: 20 (2022 07:00) (17 - 31)  SpO2: 96% (2022 07:00) (91% - 96%)    Physical Exam:   Constitutional: NAD, well-groomed, well-developed  HEENT: PERRLA, EOMI, no drainage or redness  Neck: supple,  No JVD  Respiratory: Breath Sounds equal & clear bilaterally to auscultation, no rales/rhonchi/wheezing, no accessory muscle use noted  Cardiovascular: Regular rate, regular rhythm, normal S1, S2; no murmurs or rub  Gastrointestinal: Soft, non-tender, non distended, + bowel sounds  Extremities: BRADFORD x 4, no peripheral edema, no cyanosis, no clubbing   Neurological: A+O x 3; speech clear and intact; no sensory, motor  deficits, normal reflexes  Skin: warm, dry, well perfused  Incisions: R fem IABP/ RIJ cordis: c/d/i w/o erythema , bleeding, nontender    ============================I/O===========================   I&O's Detail    2022 07:01  -  2022 07:00  --------------------------------------------------------  IN:    Heparin: 284.5 mL    IV PiggyBack: 50 mL    Milrinone: 250.7 mL    Oral Fluid: 720 mL  Total IN: 1305.2 mL    OUT:    Voided (mL): 2595 mL  Total OUT: 2595 mL    Total NET: -1289.8 mL      2022 07:01  -  2022 08:01  --------------------------------------------------------  IN:    Heparin: 12.5 mL    Milrinone: 10.9 mL  Total IN: 23.4 mL    OUT:    Voided (mL): 300 mL  Total OUT: 300 mL    Total NET: -276.6 mL        ============================ LABS =========================                        13.4   14.71 )-----------( 252      ( 2022 03:39 )             40.5     06-09    137  |  102  |  20  ----------------------------<  159<H>  4.5   |  22  |  1.29    Ca    9.1      2022 03:38  Phos  2.9       Mg     1.9     -    TPro  6.6  /  Alb  3.1<L>  /  TBili  0.7  /  DBili  x   /  AST  21  /  ALT  26  /  AlkPhos  64  06-09    LIVER FUNCTIONS - ( 2022 03:38 )  Alb: 3.1 g/dL / Pro: 6.6 g/dL / ALK PHOS: 64 U/L / ALT: 26 U/L / AST: 21 U/L / GGT: x           PT/INR - ( 2022 02:51 )   PT: 13.2 sec;   INR: 1.15 ratio         PTT - ( 2022 02:51 )  PTT:53.0 sec    Urinalysis Basic - ( 2022 00:26 )    Color: Light Yellow / Appearance: Clear / S.014 / pH: x  Gluc: x / Ketone: Negative  / Bili: Negative / Urobili: Negative   Blood: x / Protein: Trace / Nitrite: Negative   Leuk Esterase: Negative / RBC: 1 /hpf / WBC 0 /HPF   Sq Epi: x / Non Sq Epi: 0 /hpf / Bacteria: Negative      ======================Micro/Rad/Cardio=================  Culture: Reviewed   CXR: Reviewed  Echo:Reviewed  ======================================================  PAST MEDICAL & SURGICAL HISTORY:  AV block      Essential hypertension      Chronic HFrEF (heart failure with reduced ejection fraction)      Chronic kidney disease, unspecified CKD stage      CAD (coronary artery disease)      HLD (hyperlipidemia)      Type 2 diabetes mellitus      Artificial cardiac pacemaker        ====================ASSESSMENT ==============  67 y/o male w/ PMHx HTN, HLD, type 2 DM, chronic HFrEF (EF 25-30% by Echo), complete heart block s/p PPM (in , upgraded to BiV-ICD in 2016), CAD (s/p recent BERNABE mid LAD at [facility] on 2022), and stage II CKD (baseline Cr ~1.3) presented with near syncope to OSH found to have 41 episodes of VT on device, s/p unsuccessful VT ablation and transferred to [facility] for management of cardiogenic shock and advanced therapy eval.     Plan:  ====================== NEUROLOGY=====================  - AAOx3  - No active issues    acetaminophen     Tablet .. 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 low diastolic pulmonary gradient  - bedside nipride study done  with reduction in PVR to <2, still with elevated PA pressures   - SPO2 94-96% on room air     ====================CARDIOVASCULAR==================  # Cardiogenic shock   - TTE : LV 5.2 cm, LVEF 10-15%, LVOT VTI 10 cm, moderate RV dysfunction, mild AI, minimal MR  - [facility] : patent mLAD stent with slow flow, D1 with 40-50% stenosis  - [facility] : RA 12, PA 70/40 (50), PCWP 22, Milana CO/CI 2.3/1.3, MAP 83 with SVR 2469, PVR 12 PERSAUD  - : RA 10, PA 76/35 (49), PCWP 34, PA sat 57% with Milana CO/CI 3.1/1.6, MAP 71 with SVR 1574, PVR 4.8  - 6/6 RHC: CVP 17, RV 75/4, PA 80/36, PCW 22, CI 1.88. IABP placed and pt transferred to CICU.   - Maintain Milrinone .5mcg/kg/min   - Cont hydral 100 TID and ISDN 40TID for AL Reduction, Assisted mean goal 70-80   - monitor hemodynamics and perfusion indices   - HF following     # VTach   - s/p EPS on , unable to perform ablation as no substrate found and did not induce VT because of severely low EF   - Interrogation of ICD @Dr Wilson's office : back-to-back episodes of VT @ 200+ bpm all terminating with ATP. Since last cath pt has had 41 VT episodes (all falling into VF zone)  - Normal device function. BIV pacing 97%. No programming changes made  -. c/w Amiodarone  - c/w Toprol 25 daily   - pending OHT listing   - no further VT on inotropes     # CAD   - Chest pain free and compliant with DAPT since last PCI 22  - Cardiac cath @[facility] 22: mLAD 90% s/p BERNABE, LCx mild disease, RCA mild disease s/p diagnostic cardiac cath w/ Dr Wagner on 2022   - Cont ASA and Lipitor   - per discussion with interventional at OSH, ok to DC plavix, last dose     aMIOdarone    Tablet 200 milliGRAM(s) Oral daily  hydrALAZINE 100 milliGRAM(s) Oral every 8 hours  isosorbide   dinitrate Tablet (ISORDIL) 20 milliGRAM(s) Oral three times a day  metoprolol succinate ER 25 milliGRAM(s) Oral daily  milrinone Infusion 0.5 MICROgram(s)/kG/Min (11 mL/Hr) IV Continuous <Continuous>    ===================HEMATOLOGIC/ONC ===================  Secondary Polycythemia Vera   - elevated EPO, hgb in normal range  - Heme following peripherally  - f/u MELINA-2     VTE ppx  - Heparin gtt for IABP, low ptt goal     aspirin enteric coated 81 milliGRAM(s) Oral daily  heparin  Infusion 1200 Unit(s)/Hr (12.5 mL/Hr) IV Continuous <Continuous>    ===================== RENAL =========================  # JAGRUTI on CKD II  Admitted w/ Cr 2.01 (baseline Cr ~1.3) was downtrending with inotropic support,  started increasing again to 1.64  - likely i/s/o cardiogenic shock given RHC findings and CI of 1.88  - Avoid nephrotoxic agents, NSAIDs. Renally dose meds.  - monitor strict IOs and continue current cardiac support, repat SCr 4PM post IABP insertion       ==================== GASTROINTESTINAL===================  - No active issues  - Cont diabetic diet   - Cont. PPI and bowel regimen, last BM   - s/p colonoscopy 6/3 with 5 polyps removed and biopsied, benign findings- GI recs for 3 yr follow up colo       pantoprazole    Tablet 40 milliGRAM(s) Oral before breakfast  simethicone 80 milliGRAM(s) Chew every 6 hours PRN Gas    =======================    ENDOCRINE  =====================  # T2DM (A1C 6.2 on admission)  - Cont. ISS, glucose controlled, monitor FS closely       allopurinol 100 milliGRAM(s) Oral daily  atorvastatin 80 milliGRAM(s) Oral at bedtime  insulin lispro (ADMELOG) corrective regimen sliding scale   SubCutaneous at bedtime  insulin lispro (ADMELOG) corrective regimen sliding scale   SubCutaneous three times a day before meals    ========================INFECTIOUS DISEASE================  # RUE Thrombophlebitis  - RUE US Duplex showing superficial thrombus  - cont ancef 1g q8 - 6/7     Febrile 6/7-8 overnight x1   - UA neg, BCx x2 sent. In setting of 1x fever and new lines - maintain and follow fever curve  - d/c'd swan      =======================MSK=============================  # Gout flare, right knee   - continue allopurinol, s/p prednisone     Lines: R fem IABP/ and RIJ Cordis     Patient requires continuous monitoring with bedside rhythm monitoring, arterial line, pulse oximetry, ventilator monitoring and intermittent blood gas analysis.  Care plan discussed with ICU care team.  Patient remained critical; required more than usual post op care; I have spent 35 minutes providing non-routine post op care, revaluated multiple times during the day.         GOCOOL, LENNOX  MRN-[id number]  Patient is a 66y old  Male who presents with a chief complaint of LVAD/OHT eval (2022 23:06)    HPI:  66M Mixed Ischemic/NICM Cardiomyopathy (EF 25-30% at baseline, s/p BIV-ICD), CAD (medically managed MIs in ,, Most recent stent in 2022 to mLAD) presented with multiple near syncope, found to have 41 episodes of VT and admitted initially to cardiac telemetry for further evaluation/management. Ischemic evaluation without new disease and VT ablation without substrate to complete ablation.   Transferred from [facility] for further management and evaluation for LVAD vs OHT.    (26 May 2022 20:31)      Overnight events:  - c/o L knee pain, likely gout pain    REVIEW OF SYSTEMS:    CONSTITUTIONAL: No weakness, fevers or chills  EYES/ENT: No visual changes;  No vertigo or throat pain   NECK: No pain or stiffness  RESPIRATORY: No cough, wheezing, hemoptysis; No shortness of breath  CARDIOVASCULAR: No chest pain or palpitations  GASTROINTESTINAL: No abdominal or epigastric pain. No nausea, vomiting, or hematemesis; No diarrhea or constipation. No melena or hematochezia.  MSK: + L knee pain  GENITOURINARY: No dysuria, frequency or hematuria  NEUROLOGICAL: No numbness or weakness  SKIN: No itching, rashes    Physical Exam:  Vital Signs Last 24 Hrs  T(C): 36.9 (2022 07:00), Max: 37.9 (2022 16:00)  T(F): 98.5 (2022 07:00), Max: 100.2 (2022 16:00)  HR: 81 (2022 07:00) (80 - 116)  BP: --  BP(mean): 84 (2022 14:30) (84 - 84)  RR: 20 (2022 07:00) (17 - 31)  SpO2: 96% (2022 07:00) (91% - 96%)    Physical Exam:   Constitutional: NAD, well-groomed, well-developed  HEENT: PERRLA, EOMI, no drainage or redness  Neck: supple,  No JVD  Respiratory: Breath Sounds equal & clear bilaterally to auscultation, no rales/rhonchi/wheezing, no accessory muscle use noted  Cardiovascular: Regular rate, regular rhythm, normal S1, S2; no murmurs or rub  Gastrointestinal: Soft, non-tender, non distended, + bowel sounds  Extremities: BRADFORD x 4, nontender L knee, no warmth, tenderness, no peripheral edema, no cyanosis, no clubbing   Neurological: A+O x 3; speech clear and intact; no sensory, motor  deficits, normal reflexes  Skin: warm, dry, well perfused  Incisions: R fem IABP/ RIJ cordis: c/d/i w/o erythema , bleeding, nontender    ============================I/O===========================   I&O's Detail    2022 07:  -  2022 07:00  --------------------------------------------------------  IN:    Heparin: 284.5 mL    IV PiggyBack: 50 mL    Milrinone: 250.7 mL    Oral Fluid: 720 mL  Total IN: 1305.2 mL    OUT:    Voided (mL): 2595 mL  Total OUT: 2595 mL  Total NET: -1289.8 mL      2022 07:01  -  2022 08:01  --------------------------------------------------------  IN:    Heparin: 12.5 mL    Milrinone: 10.9 mL  Total IN: 23.4 mL    OUT:    Voided (mL): 300 mL  Total OUT: 300 mL    Total NET: -276.6 mL    ============================ LABS =========================                        13.4   14.71 )-----------( 252      ( 2022 03:39 )             40.5         137  |  102  |  20  ----------------------------<  159<H>  4.5   |  22  |  1.29    Ca    9.1      2022 03:38  Phos  2.9     -  Mg     1.9     -    TPro  6.6  /  Alb  3.1<L>  /  TBili  0.7  /  DBili  x   /  AST  21  /  ALT  26  /  AlkPhos  64  06-09    LIVER FUNCTIONS - ( 2022 03:38 )  Alb: 3.1 g/dL / Pro: 6.6 g/dL / ALK PHOS: 64 U/L / ALT: 26 U/L / AST: 21 U/L / GGT: x           PT/INR - ( 2022 02:51 )   PT: 13.2 sec;   INR: 1.15 ratio         PTT - ( 2022 02:51 )  PTT:53.0 sec    Urinalysis Basic - ( 2022 00:26 )    Color: Light Yellow / Appearance: Clear / S.014 / pH: x  Gluc: x / Ketone: Negative  / Bili: Negative / Urobili: Negative   Blood: x / Protein: Trace / Nitrite: Negative   Leuk Esterase: Negative / RBC: 1 /hpf / WBC 0 /HPF   Sq Epi: x / Non Sq Epi: 0 /hpf / Bacteria: Negative      ======================Micro/Rad/Cardio=================  Culture: Reviewed   CXR: Reviewed  Echo:Reviewed  ======================================================  PAST MEDICAL & SURGICAL HISTORY:  AV block  Essential hypertension  Chronic HFrEF (heart failure with reduced ejection fraction)  Chronic kidney disease, unspecified CKD stage  CAD (coronary artery disease)  HLD (hyperlipidemia)  Type 2 diabetes mellitus  Artificial cardiac pacemaker    ====================ASSESSMENT ==============  67 y/o male w/ PMHx HTN, HLD, type 2 DM, chronic HFrEF (EF 25-30% by Echo), complete heart block s/p PPM (in , upgraded to BiV-ICD in 2016), CAD (s/p recent BERNABE mid LAD at [facility] on 2022), and stage II CKD (baseline Cr ~1.3) presented with near syncope to OSH found to have 41 episodes of VT on device, s/p unsuccessful VT ablation and transferred to [facility] for management of cardiogenic shock and advanced therapy eval.     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 with low diastolic pulmonary gradient  - bedside nipride study done  with reduction in PVR to <2,    ====================CARDIOVASCULAR==================  # Cardiogenic shock   - TTE : LV 5.2 cm, LVEF 10-15%, LVOT VTI 10 cm, moderate RV dysfunction, mild AI, minimal MR  - [facility] : patent mLAD stent with slow flow, D1 with 40-50% stenosis  - [facility] : RA 12, PA 70/40 (50), PCWP 22, Milana CO/CI 2.3/1.3, MAP 83 with SVR 2469, PVR 12 PERSAUD  - : RA 10, PA 76/35 (49), PCWP 34, PA sat 57% with Milana CO/CI 3.1/1.6, MAP 71 with SVR 1574, PVR 4.8  - 6/6 RHC: CVP 17, RV 75/4, PA 80/36, PCW 22, CI 1.88. IABP placed and pt transferred to CICU.   - Maintain IABP 1:1, monitor PTT   - Maintain Milrinone .5mcg/kg/min   - Cont hydral 100 TID and ISDN 40TID for AL Reduction, Assisted mean goal 70-80   - monitor hemodynamics and perfusion indices   - HF following     # VTach   - s/p EPS on , unable to perform ablation as no substrate found and did not induce VT because of severely low EF   - Interrogation of ICD @Dr Wilson's office : back-to-back episodes of VT @ 200+ bpm all terminating with ATP. Since last cath pt has had 41 VT episodes (all falling into VF zone)  - Normal device function. BIV pacing 97%. No programming changes made  - c/w Amiodarone  - c/w Toprol 25 daily   - pending OHT listing s/p negative cultures  - no further VT on inotropes     # CAD   - Chest pain free and compliant with DAPT since last PCI 22/ per discussion with interventional at OSH, ok to DC plavix, last dose   - Cardiac cath @[facility] 22: mLAD 90% s/p BERNABE, LCx mild disease, RCA mild disease s/p diagnostic cardiac cath w/ Dr Wagner on 2022   - Cont ASA and Lipitor     ===================HEMATOLOGIC/ONC ===================  Secondary Polycythemia Vera   - elevated EPO, hgb in normal range  - Heme following peripherally  - f/u MELINA-2     VTE ppx  - Heparin gtt for IABP, low ptt goal     ===================== RENAL =========================  # JAGRUTI on CKD II  Admitted w/ Cr 2.01 (baseline Cr ~1.3) was downtrending with inotropic support,  started increasing again to 1.64  - likely i/s/o cardiogenic shock given RHC findings and CI of 1.88  - Avoid nephrotoxic agents, NSAIDs. Renally dose meds.  - monitor strict IOs and continue current cardiac support, repat SCr 4PM post IABP insertion       ==================== GASTROINTESTINAL===================  Consistent Card Diet:  - Tolerating PO diet    Ulcer ppx:  - Cont. PPI     Constipation:  - c/w bowel regimen, last BM   - s/p colonoscopy 6/3 with 5 polyps removed and biopsied, benign findings- GI recs for 3 yr follow up colo     =======================    ENDOCRINE  =====================  # T2DM (A1C 6.2 on admission)  - Cont. ISS, glucose controlled, monitor FS closely     ========================INFECTIOUS DISEASE================  # RUE Thrombophlebitis  - RUE US Duplex showing superficial thrombus  - cont ancef 1g q8 - 6/7     Febrile 6/7-8 overnight x1   - UA neg, BCx x2 sent. In setting of 1x fever and new lines   - maintain and follow fever curve  - d/c'd swan      =======================MSK=============================  # Gout flare, Left knee (previously R)  - continue allopurinol, s/p prednisone     Lines: R fem IABP/6 and RIJ Cordis     Patient requires continuous monitoring with bedside rhythm monitoring, arterial line, pulse oximetry, ventilator monitoring and intermittent blood gas analysis.  Care plan discussed with ICU care team.  Patient remained critical; required more than usual post op care; I have spent 35 minutes providing non-routine post op care, revaluated multiple times during the day.

## 2022-06-09 NOTE — PROGRESS NOTE ADULT - PROBLEM SELECTOR PLAN 5
- hx of VT s/p unsuccessful VT ablation  - continue on amio 200 mg PO QD  - since being admitted to [facility] has not had any episodes of VT, currently tolerating high dose milrinone. - hx of VT s/p unsuccessful VT ablation  - continue on amio 200 mg PO QD  - since being admitted to [facility] has not had any episodes of VT, currently tolerating high dose milrinone.  -paced at 80 on telemetry with few PVCs

## 2022-06-09 NOTE — PROGRESS NOTE ADULT - PROBLEM SELECTOR PLAN 6
- upon arrival was noted to have JAGRUTI, likely related to low cardiac output and is now improving 6/8 SCr 1.12, 6/9 1.29   - upon arrival was noted to have JAGRUTI, likely related to low cardiac output and is now improving/stable

## 2022-06-09 NOTE — PROGRESS NOTE ADULT - CRITICAL CARE ATTENDING COMMENT
Noted. Thank you.    Presented to [facility] with VT, found to be in cardiogenic shock and transferred to [facility]  A+Ox3  Listed for heart transplant  Cardiogenic shock, on Milrinone and IABP - maintain until transplant  On max doses of Hydralazine/Isordil for afterload reduction - will maintain  Contine Toprol for VT  S/p stent in 4/22, continue ASA but holding Plavix   O2 sats mid 90s on room air  DASH/diabetic diet  Normal renal function, improved post IABP, CVP low, autodiuresing - hold diuresis  H/H acceptable on Heparin drip for IABP  Febrile yesterday, s/p Ancef for UE cellulitis, pan cultured, [place] removed, deferring antibiotics  ID is following, currently status 7 until cultures result  Sugars controlled  RIJ Cordis and IABP 6/6

## 2022-06-09 NOTE — PROGRESS NOTE ADULT - SUBJECTIVE AND OBJECTIVE BOX
PATIENT:  LENNOX GOCOOL  [id number]    CHIEF COMPLAINT:  Patient is a 66y old  Male who presents with a chief complaint of LVAD/OHT eval (2022 08:00)      INTERVAL HISTORY/OVERNIGHT EVENTS:  Pt seen and examined. No acute overnight events. This AM patient complains of feeling chills, then feeling hot intermittently. Also has Left knee pain, states he feels like it is his gout     MEDICATIONS:  MEDICATIONS  (STANDING):  allopurinol 100 milliGRAM(s) Oral daily  aMIOdarone    Tablet 200 milliGRAM(s) Oral daily  aspirin enteric coated 81 milliGRAM(s) Oral daily  atorvastatin 80 milliGRAM(s) Oral at bedtime  chlorhexidine 2% Cloths 1 Application(s) Topical daily  chlorhexidine 4% Liquid 1 Application(s) Topical <User Schedule>  heparin  Infusion 1200 Unit(s)/Hr (12.5 mL/Hr) IV Continuous <Continuous>  hydrALAZINE 100 milliGRAM(s) Oral every 8 hours  influenza   Vaccine 0.5 milliLiter(s) IntraMuscular once  insulin lispro (ADMELOG) corrective regimen sliding scale   SubCutaneous at bedtime  insulin lispro (ADMELOG) corrective regimen sliding scale   SubCutaneous three times a day before meals  isosorbide   dinitrate Tablet (ISORDIL) 40 milliGRAM(s) Oral three times a day  lidocaine   4% Patch 1 Patch Transdermal daily  metoprolol succinate ER 25 milliGRAM(s) Oral daily  milrinone Infusion 0.5 MICROgram(s)/kG/Min (11 mL/Hr) IV Continuous <Continuous>  pantoprazole    Tablet 40 milliGRAM(s) Oral before breakfast  polyethylene glycol 3350 17 Gram(s) Oral daily  senna 1 Tablet(s) Oral at bedtime    MEDICATIONS  (PRN):  acetaminophen     Tablet .. 650 milliGRAM(s) Oral every 6 hours PRN Temp greater or equal to 38C (100.4F), Mild Pain (1 - 3)  simethicone 80 milliGRAM(s) Chew every 6 hours PRN Gas      ALLERGIES:  Allergies    No Known Allergies    Intolerances        OBJECTIVE:  ICU Vital Signs Last 24 Hrs  T(C): 36.9 (2022 07:00), Max: 37.9 (2022 16:00)  T(F): 98.5 (2022 07:00), Max: 100.2 (2022 16:00)  HR: 80 (2022 09:00) (80 - 85)  BP: --  BP(mean): 84 (2022 14:30) (84 - 84)  ABP: --  ABP(mean): --  RR: 21 (2022 09:00) (17 - 31)  SpO2: 93% (2022 09:00) (91% - 96%)    I&O's Summary    2022 07:01  -  2022 07:00  --------------------------------------------------------  IN: 1305.2 mL / OUT: 2595 mL / NET: -1289.8 mL    2022 07:01  -  2022 09:17  --------------------------------------------------------  IN: 190.2 mL / OUT: 300 mL / NET: -109.8 mL      Daily     Daily Weight in k.4 (2022 05:00)  Adult Advanced Hemodynamics Last 24 Hrs  CVP(mm Hg): 4 (2022 18:00) (-1 - 7)  CVP(cm H2O): --  CO: 5 (2022 14:30) (5 - 5)  CI: 2.6 (2022 14:30) (2.6 - 2.6)  PA: 68/25 (2022 18:00) (53/21 - 80/31)  PA(mean): 38 (2022 18:00) (31 - 50)  PCWP: --  SVR: 1326 (2022 14:30) (1326 - 1326)  SVRI: 2550 (2022 14:30) (2550 - 2550)  PVR: --  PVRI: --        PHYSICAL EXAMINATION:  General: WN/WD NAD  HEENT: PERRLA, EOMI, moist mucous membranes  Neurology: A&Ox3, nonfocal, BRADFORD x 4  Respiratory: CTA B/L, normal respiratory effort, no wheezes, crackles, rales  CV: RRR, S1S2, no murmurs, rubs or gallops  Abdominal: Soft, NT, ND +BS, Last BM 2 days ago   Extremities: No edema, + peripheral pulses  Incisions: IABP in R groin with dressing c/d/i   Tubes:  All lines are intact, no pulsatile masses or cyanosis    LABS:                          13.4   14.71 )-----------( 252      ( 2022 03:39 )             40.5     06-09    137  |  102  |  20  ----------------------------<  159<H>  4.5   |  22  |  1.29    Ca    9.1      2022 03:38  Phos  2.9     06-  Mg     1.9     -    TPro  6.6  /  Alb  3.1<L>  /  TBili  0.7  /  DBili  x   /  AST  21  /  ALT  26  /  AlkPhos  64  06-09    LIVER FUNCTIONS - ( 2022 03:38 )  Alb: 3.1 g/dL / Pro: 6.6 g/dL / ALK PHOS: 64 U/L / ALT: 26 U/L / AST: 21 U/L / GGT: x           PT/INR - ( 2022 02:51 )   PT: 13.2 sec;   INR: 1.15 ratio         PTT - ( 2022 02:51 )  PTT:53.0 sec        Urinalysis Basic - ( 2022 00:26 )    Color: Light Yellow / Appearance: Clear / S.014 / pH: x  Gluc: x / Ketone: Negative  / Bili: Negative / Urobili: Negative   Blood: x / Protein: Trace / Nitrite: Negative   Leuk Esterase: Negative / RBC: 1 /hpf / WBC 0 /HPF   Sq Epi: x / Non Sq Epi: 0 /hpf / Bacteria: Negative      CAPILLARY BLOOD GLUCOSE      POCT Blood Glucose.: 120 mg/dL (2022 08:14)  POCT Blood Glucose.: 125 mg/dL (2022 22:24)  POCT Blood Glucose.: 150 mg/dL (2022 16:27)  POCT Blood Glucose.: 137 mg/dL (2022 11:40)    CAPILLARY BLOOD GLUCOSE      POCT Blood Glucose.: 120 mg/dL (2022 08:14)      TELEMETRY:   Paced at 80       PATIENT:  LENNOX GOCOOL  [id number]    CHIEF COMPLAINT:  Patient is a 66y old  Male who presents with a chief complaint of LVAD/OHT eval (2022 08:00)      INTERVAL HISTORY/OVERNIGHT EVENTS:  Pt seen and examined. No acute overnight events. This AM patient complains of feeling chills, then feeling hot intermittently. Also has Left knee pain, states he feels like it is his gout     MEDICATIONS:  MEDICATIONS  (STANDING):  allopurinol 100 milliGRAM(s) Oral daily  aMIOdarone    Tablet 200 milliGRAM(s) Oral daily  aspirin enteric coated 81 milliGRAM(s) Oral daily  atorvastatin 80 milliGRAM(s) Oral at bedtime  chlorhexidine 2% Cloths 1 Application(s) Topical daily  chlorhexidine 4% Liquid 1 Application(s) Topical <User Schedule>  heparin  Infusion 1200 Unit(s)/Hr (12.5 mL/Hr) IV Continuous <Continuous>  hydrALAZINE 100 milliGRAM(s) Oral every 8 hours  influenza   Vaccine 0.5 milliLiter(s) IntraMuscular once  insulin lispro (ADMELOG) corrective regimen sliding scale   SubCutaneous at bedtime  insulin lispro (ADMELOG) corrective regimen sliding scale   SubCutaneous three times a day before meals  isosorbide   dinitrate Tablet (ISORDIL) 40 milliGRAM(s) Oral three times a day  lidocaine   4% Patch 1 Patch Transdermal daily  metoprolol succinate ER 25 milliGRAM(s) Oral daily  milrinone Infusion 0.5 MICROgram(s)/kG/Min (11 mL/Hr) IV Continuous <Continuous>  pantoprazole    Tablet 40 milliGRAM(s) Oral before breakfast  polyethylene glycol 3350 17 Gram(s) Oral daily  senna 1 Tablet(s) Oral at bedtime    MEDICATIONS  (PRN):  acetaminophen     Tablet .. 650 milliGRAM(s) Oral every 6 hours PRN Temp greater or equal to 38C (100.4F), Mild Pain (1 - 3)  simethicone 80 milliGRAM(s) Chew every 6 hours PRN Gas      ALLERGIES:  Allergies    No Known Allergies    Intolerances        OBJECTIVE:  ICU Vital Signs Last 24 Hrs  T(C): 36.9 (2022 07:00), Max: 37.9 (2022 16:00)  T(F): 98.5 (2022 07:00), Max: 100.2 (2022 16:00)  HR: 80 (2022 09:00) (80 - 85)  BP: --  BP(mean): 84 (2022 14:30) (84 - 84)  ABP: --  ABP(mean): --  RR: 21 (2022 09:00) (17 - 31)  SpO2: 93% (2022 09:00) (91% - 96%)    I&O's Summary    2022 07:01  -  2022 07:00  --------------------------------------------------------  IN: 1305.2 mL / OUT: 2595 mL / NET: -1289.8 mL    2022 07:01  -  2022 09:17  --------------------------------------------------------  IN: 190.2 mL / OUT: 300 mL / NET: -109.8 mL      Daily     Daily Weight in k.4 (2022 05:00)  Adult Advanced Hemodynamics Last 24 Hrs  CVP(mm Hg): 4 (2022 18:00) (-1 - 7)  CVP(cm H2O): --  CO: 5 (2022 14:30) (5 - 5)  CI: 2.6 (2022 14:30) (2.6 - 2.6)  PA: 68/25 (2022 18:00) (53/21 - 80/31)  PA(mean): 38 (2022 18:00) (31 - 50)  PCWP: --  SVR: 1326 (2022 14:30) (1326 - 1326)  SVRI: 2550 (2022 14:30) (2550 - 2550)  PVR: --  PVRI: --        PHYSICAL EXAMINATION:  General: WN/WD NAD  HEENT: PERRLA, EOMI, moist mucous membranes  Neurology: A&Ox3, nonfocal, BRADFORD x 4  Respiratory: CTA B/L, normal respiratory effort, no wheezes, crackles, rales  CV: RRR, S1S2, no murmurs, rubs or gallops  Abdominal: Soft, NT, ND +BS, Last BM 2 days ago   Extremities: No edema, + peripheral pulses  Incisions: IABP in R groin with dressing c/d/i   Tubes:  All lines are intact, no pulsatile masses or cyanosis    LABS:                          13.4   14.71 )-----------( 252      ( 2022 03:39 )             40.5     06-09    137  |  102  |  20  ----------------------------<  159<H>  4.5   |  22  |  1.29    Ca    9.1      2022 03:38  Phos  2.9     06-  Mg     1.9     -    TPro  6.6  /  Alb  3.1<L>  /  TBili  0.7  /  DBili  x   /  AST  21  /  ALT  26  /  AlkPhos  64  06-09    LIVER FUNCTIONS - ( 2022 03:38 )  Alb: 3.1 g/dL / Pro: 6.6 g/dL / ALK PHOS: 64 U/L / ALT: 26 U/L / AST: 21 U/L / GGT: x           PT/INR - ( 2022 02:51 )   PT: 13.2 sec;   INR: 1.15 ratio         PTT - ( 2022 02:51 )  PTT:53.0 sec        Urinalysis Basic - ( 2022 00:26 )    Color: Light Yellow / Appearance: Clear / S.014 / pH: x  Gluc: x / Ketone: Negative  / Bili: Negative / Urobili: Negative   Blood: x / Protein: Trace / Nitrite: Negative   Leuk Esterase: Negative / RBC: 1 /hpf / WBC 0 /HPF   Sq Epi: x / Non Sq Epi: 0 /hpf / Bacteria: Negative      CAPILLARY BLOOD GLUCOSE      POCT Blood Glucose.: 120 mg/dL (2022 08:14)  POCT Blood Glucose.: 125 mg/dL (2022 22:24)  POCT Blood Glucose.: 150 mg/dL (2022 16:27)  POCT Blood Glucose.: 137 mg/dL (2022 11:40)    CAPILLARY BLOOD GLUCOSE      POCT Blood Glucose.: 120 mg/dL (2022 08:14)      TELEMETRY:   Paced at 80

## 2022-06-09 NOTE — PROGRESS NOTE ADULT - PROBLEM SELECTOR PLAN 10
- improved after having 6 BMs yesterday  - bowel regimen per CICU team -patient complain of constipation for past several days   -bowel regimen per CCU

## 2022-06-09 NOTE — PROGRESS NOTE ADULT - SUBJECTIVE AND OBJECTIVE BOX
GOCOOL, LENNOX  MRN-[id number]  Patient is a 66y old  Male who presents with a chief complaint of LVAD/OHT eval (2022 09:16)    HPI:  66M Mixed Ischemic/NICM Cardiomyopathy (EF 25-30% at baseline, s/p BIV-ICD), CAD (medically managed MIs in ,, Most recent stent in 2022 to mLAD) presented with multiple near syncope, found to have 41 episodes of VT and admitted initially to cardiac telemetry for further evaluation/management. Ischemic evaluation without new disease and VT ablation without substrate to complete ablation.   Transferred from [facility] for further management and evaluation for LVAD vs OHT.    (26 May 2022 20:31)      Hospital Course:   Transferred to CCU for further management    24 HOUR EVENTS:    REVIEW OF SYSTEMS:    CONSTITUTIONAL: No weakness, fevers or chills  EYES/ENT: No visual changes;  No vertigo or throat pain   NECK: No pain or stiffness  RESPIRATORY: No cough, wheezing, hemoptysis; No shortness of breath  CARDIOVASCULAR: No chest pain or palpitations  GASTROINTESTINAL: No abdominal or epigastric pain. No nausea, vomiting, or hematemesis; No diarrhea or constipation. No melena or hematochezia.  MSK: + L knee pain  GENITOURINARY: No dysuria, frequency or hematuria  NEUROLOGICAL: No numbness or weakness  SKIN: No itching, rashes      ICU Vital Signs Last 24 Hrs  T(C): 37.6 (2022 20:00), Max: 37.6 (2022 20:00)  T(F): 99.6 (2022 20:00), Max: 99.6 (2022 20:00)  HR: 80 (2022 22:00) (80 - 85)  BP: --  BP(mean): --  ABP: --  ABP(mean): --  RR: 22 (2022 22:00) (17 - 28)  SpO2: 93% (2022 22:00) (91% - 96%)      CVP(mm Hg): 4 (22 @ 18:00) (-4 - 7)  CO: 5 (22 @ 14:30) (5 - 5)  CI: 2.6 (22 @ 14:30) (2.6 - 2.6)  PA: 68/25 (22 @ 18:00) (45/18 - 80/31)  PA(mean): 38 (22 @ 18:00) (27 - 50)  SVR: 1326 (22 @ 14:30) (1326 - 1326)  SVRI: 2550 (22 @ 14:30) (2550 - 2550)       I&O's Summary    2022 07:01  -  2022 07:00  --------------------------------------------------------  IN: 1305.2 mL / OUT: 2595 mL / NET: -1289.8 mL    2022 07:01  -  2022 22:59  --------------------------------------------------------  IN: 891 mL / OUT: 1250 mL / NET: -359 mL        CAPILLARY BLOOD GLUCOSE     POCT Blood Glucose.: 134 mg/dL (2022 20:54)      PHYSICAL EXAM:  GENERAL: No acute distress, well-developed  HEAD:  Atraumatic, Normocephalic  EYES: EOMI, PERRLA, conjunctiva and sclera clear  NECK: Supple, no lymphadenopathy, no JVD  CHEST/LUNG: CTAB; No wheezes, rales, or rhonchi  HEART: Regular rate and rhythm. Normal S1/S2. No murmurs, rubs, or gallops  ABDOMEN: Soft, non-tender, non-distended; normal bowel sounds, no organomegaly  EXTREMITIES:  2+ peripheral pulses b/l, No clubbing, cyanosis, or edema / +RUE Thrombophlebitis,   nontender L knee, no warmth, tenderness, no peripheral edema, no cyanosis, no clubbing   NEUROLOGY: A&O x 3, no focal deficits  SKIN: No rashes or lesions  Incisions: R fem IABP/ RIJ cordis: c/d/i w/o erythema , bleeding, nontender    ============================I/O===========================   I&O's Detail    2022 07:  -  2022 07:00  --------------------------------------------------------  IN:    Heparin: 284.5 mL    IV PiggyBack: 50 mL    Milrinone: 250.7 mL    Oral Fluid: 720 mL  Total IN: 1305.2 mL    OUT:    Voided (mL): 2595 mL  Total OUT: 2595 mL    Total NET: -1289.8 mL      2022 07:01  -  2022 22:59  --------------------------------------------------------  IN:    Heparin: 187.5 mL    Milrinone: 163.5 mL    Oral Fluid: 540 mL  Total IN: 891 mL    OUT:    Voided (mL): 1250 mL  Total OUT: 1250 mL    Total NET: -359 mL        ============================ LABS =========================                        13.4   14.71 )-----------( 252      ( 2022 03:39 )             40.5         137  |  102  |  20  ----------------------------<  159<H>  4.5   |  22  |  1.29    Ca    9.1      2022 03:38  Phos  2.9     06-  Mg     1.9         TPro  6.6  /  Alb  3.1<L>  /  TBili  0.7  /  DBili  x   /  AST  21  /  ALT  26  /  AlkPhos  64         LIVER FUNCTIONS - ( 2022 03:38 )  Alb: 3.1 g/dL / Pro: 6.6 g/dL / ALK PHOS: 64 U/L / ALT: 26 U/L / AST: 21 U/L / GGT: x           PT/INR - ( 2022 02:51 )   PT: 13.2 sec;   INR: 1.15 ratio         PTT - ( 2022 02:51 )  PTT:53.0 sec    Blood Gas Venous - Lactate: 1.3 mmol/L (22 @ 05:41)  Blood Gas Venous - Lactate: 1.1 mmol/L (22 @ 14:35)    Urinalysis Basic - ( 2022 00:26 )    Color: Light Yellow / Appearance: Clear / S.014 / pH: x  Gluc: x / Ketone: Negative  / Bili: Negative / Urobili: Negative   Blood: x / Protein: Trace / Nitrite: Negative   Leuk Esterase: Negative / RBC: 1 /hpf / WBC 0 /HPF   Sq Epi: x / Non Sq Epi: 0 /hpf / Bacteria: Negative      ======================Micro/Rad/Cardio=================  Telemetry: Reviewed   EKG: Reviewed  CXR: Reviewed  Culture: Reviewed   ======================================================  PAST MEDICAL & SURGICAL HISTORY:  AV block      Essential hypertension      Chronic HFrEF (heart failure with reduced ejection fraction)      Chronic kidney disease, unspecified CKD stage      CAD (coronary artery disease)      HLD (hyperlipidemia)      Type 2 diabetes mellitus      Artificial cardiac pacemaker        ====================ASSESSMENT ==============  65 y/o male w/ PMHx HTN, HLD, type 2 DM, chronic HFrEF (EF 25-30% by Echo), complete heart block s/p PPM (in , upgraded to BiV-ICD in 2016), CAD (s/p recent BERNABE mid LAD at [facility] on 2022), and stage II CKD (baseline Cr ~1.3) presented with near syncope to OSH found to have 41 episodes of VT on device, s/p unsuccessful VT ablation and transferred to [facility] for management of cardiogenic shock and advanced therapy eval.     Plan:  ====================== NEUROLOGY=====================   AAOx3  - No active issues  - Tylenol PRN for fevers/pain    acetaminophen     Tablet .. 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 RV enlargement with decreased RV systolic function.  - [facility] : patent mLAD stent with slow flow, D1 with 40-50% stenosis  - [facility] : RA 12, PA 70/40 (50), PCWP 22, Milana CO/CI 2.3/1.3, MAP 83 with SVR 2469, PVR 12 PERSAUD  - : RA 10, PA 76/35 (49), PCWP 34, PA sat 57% with Milana CO/CI 3.1/1.6, MAP 71 with SVR 1574, PVR 4.8  - 6/6 RHC: CVP 17, RV 75/4, PA 80/36, PCW 22, CI 1.88. IABP placed and pt transferred to CICU.   - Maintain IABP 1:1, monitor PTT   - Maintain Milrinone .5mcg/kg/min   - Cont hydral 100 TID and ISDN 40TID for AL Reduction, Assisted mean goal 70-80   - monitor hemodynamics and perfusion indices   - HF following     # VTach   - s/p EPS on , unable to perform ablation as no substrate found and did not induce VT because of severely low EF   - Interrogation of ICD @Dr Wilson's office : back-to-back episodes of VT @ 200+ bpm all terminating with ATP. Since last cath pt has had 41 VT episodes (all falling into VF zone)  - Normal device function. BIV pacing 97%. No programming changes made  - c/w Amiodarone  - c/w Toprol 25 daily   - pending OHT listing s/p negative cultures  - no further VT on inotropes     # CAD   - Chest pain free and compliant with DAPT since last PCI 22/ per discussion with interventional at OSH, ok to DC plavix, last dose   - Cardiac cath @[facility] 22: mLAD 90% s/p BERNABE, LCx mild disease, RCA mild disease s/p diagnostic cardiac cath w/ Dr Wagner on 2022   - Cont ASA and Lipitor     aspirin enteric coated 81 milliGRAM(s) Oral daily  atorvastatin 80 milliGRAM(s) Oral at bedtime  aMIOdarone    Tablet 200 milliGRAM(s) Oral daily  hydrALAZINE 100 milliGRAM(s) Oral every 8 hours  isosorbide   dinitrate Tablet (ISORDIL) 40 milliGRAM(s) Oral three times a day  metoprolol succinate ER 25 milliGRAM(s) Oral daily  milrinone Infusion 0.5 MICROgram(s)/kG/Min (11 mL/Hr) IV Continuous <Continuous>    ===================HEMATOLOGIC/ONC ===================  Secondary Polycythemia Vera   - elevated EPO, hgb in normal range  - Heme following peripherally  - f/u MELINA-2     AC therapy   - Heparin gtt for IABP, low ptt goal     heparin  Infusion 1200 Unit(s)/Hr (12.5 mL/Hr) IV Continuous <Continuous>    ===================== RENAL =========================  JAGRUTI on CKD II  Admitted w/ Cr 2.01 (baseline Cr ~1.3) was downtrending with inotropic support, now rising 1.12->1.29   - likely i/s/o cardiogenic shock given RHC findings and CI of 1.88  - Avoid nephrotoxic agents, NSAIDs. Renally dose meds.  - monitor strict IOs and continue current cardiac support     ==================== GASTROINTESTINAL===================  Consistent Card Diet:  - Tolerating PO diet    Ulcer ppx:  - Cont. PPI     Constipation:  - c/w bowel regimen Miralax and Senna, last BM   - s/p colonoscopy 6/3 with 5 polyps removed and biopsied, benign findings- GI recs for 3 yr follow up colo     bisacodyl Suppository 10 milliGRAM(s) Rectal daily PRN Constipation  pantoprazole    Tablet 40 milliGRAM(s) Oral before breakfast  polyethylene glycol 3350 17 Gram(s) Oral daily  senna 1 Tablet(s) Oral at bedtime  simethicone 80 milliGRAM(s) Chew every 6 hours PRN Gas    =======================    ENDOCRINE  =====================  T2DM (A1C 6.2 on admission)  - Cont. ISS, glucose controlled, monitor FS closely     Gout flare, Left knee (previously R)  - continue allopurinol, s/p prednisone     allopurinol 100 milliGRAM(s) Oral daily  insulin lispro (ADMELOG) corrective regimen sliding scale   SubCutaneous at bedtime  insulin lispro (ADMELOG) corrective regimen sliding scale   SubCutaneous three times a day before meals    ========================INFECTIOUS DISEASE================  RUE Thrombophlebitis  - RUE US Duplex showing superficial thrombus  - s/p ancef      Febrile 6/-8 overnight x1   - UA neg, BCx on  NGTD  In setting of 1x fever and new lines   - maintain and follow fever curve  - d/c'd swan        Patient requires continuous monitoring with bedside rhythm monitoring, pulse ox monitoring, and intermittent blood gas analysis. Care plan discussed with ICU care team. Patient remained critical and at risk for life threatening decompensation.  Patient seen, examined and plan discussed with CCU team during rounds.     I have personally provided ____ minutes of critical care time excluding time spent on separate procedures, in addition to initial critical care time provided by the CICU Attending, Dr. Leonardo      By signing my name below, I, Disha May, attest that this documentation has been prepared under the direction and in the presence of RAMSES Gan   Electronically signed: Licha Love, 22 @ 22:59    I, Samuel Garcia,, personally performed the services described in this documentation. all medical record entries made by the licha were at my direction and in my presence. I have reviewed the chart and agree that the record reflects my personal performance and is accurate and complete  Electronically signed: RAMSES Gan      GOCOOL, LENNOX  MRN-[id number]  Patient is a 66y old  Male who presents with a chief complaint of LVAD/OHT eval (2022 09:16)    HPI:  66M Mixed Ischemic/NICM Cardiomyopathy (EF 25-30% at baseline, s/p BIV-ICD), CAD (medically managed MIs in ,, Most recent stent in 2022 to mLAD) presented with multiple near syncope, found to have 41 episodes of VT and admitted initially to cardiac telemetry for further evaluation/management. Ischemic evaluation without new disease and VT ablation without substrate to complete ablation.   Transferred from [facility] for further management and evaluation for LVAD vs OHT.    (26 May 2022 20:31)      Hospital Course:   Transferred to CCU for further management    24 HOUR EVENTS:    REVIEW OF SYSTEMS:    CONSTITUTIONAL: No weakness, fevers or chills  EYES/ENT: No visual changes;  No vertigo or throat pain   NECK: No pain or stiffness  RESPIRATORY: No cough, wheezing, hemoptysis; No shortness of breath  CARDIOVASCULAR: No chest pain or palpitations  GASTROINTESTINAL: No abdominal or epigastric pain. No nausea, vomiting, or hematemesis; No diarrhea or constipation. No melena or hematochezia.  MSK: + L knee pain  GENITOURINARY: No dysuria, frequency or hematuria  NEUROLOGICAL: No numbness or weakness  SKIN: No itching, rashes      ICU Vital Signs Last 24 Hrs  T(C): 37.6 (2022 20:00), Max: 37.6 (2022 20:00)  T(F): 99.6 (2022 20:00), Max: 99.6 (2022 20:00)  HR: 80 (2022 22:00) (80 - 85)  BP: --  BP(mean): --  ABP: --  ABP(mean): --  RR: 22 (2022 22:00) (17 - 28)  SpO2: 93% (2022 22:00) (91% - 96%)      CVP(mm Hg): 4 (22 @ 18:00) (-4 - 7)  CO: 5 (22 @ 14:30) (5 - 5)  CI: 2.6 (22 @ 14:30) (2.6 - 2.6)  PA: 68/25 (22 @ 18:00) (45/18 - 80/31)  PA(mean): 38 (22 @ 18:00) (27 - 50)  SVR: 1326 (22 @ 14:30) (1326 - 1326)  SVRI: 2550 (22 @ 14:30) (2550 - 2550)       I&O's Summary    2022 07:01  -  2022 07:00  --------------------------------------------------------  IN: 1305.2 mL / OUT: 2595 mL / NET: -1289.8 mL    2022 07:01  -  2022 22:59  --------------------------------------------------------  IN: 891 mL / OUT: 1250 mL / NET: -359 mL        CAPILLARY BLOOD GLUCOSE     POCT Blood Glucose.: 134 mg/dL (2022 20:54)      PHYSICAL EXAM:  GENERAL: No acute distress, well-developed  HEAD:  Atraumatic, Normocephalic  EYES: EOMI, PERRLA, conjunctiva and sclera clear  NECK: Supple, no lymphadenopathy, no JVD  CHEST/LUNG: CTAB; No wheezes, rales, or rhonchi  HEART: Regular rate and rhythm. Normal S1/S2. No murmurs, rubs, or gallops  ABDOMEN: Soft, non-tender, non-distended; normal bowel sounds, no organomegaly  EXTREMITIES:  2+ peripheral pulses b/l, No clubbing, cyanosis, or edema / +RUE Thrombophlebitis,   nontender L knee, no warmth, tenderness, no peripheral edema, no cyanosis, no clubbing   NEUROLOGY: A&O x 3, no focal deficits  SKIN: No rashes or lesions  Incisions: R fem IABP/ RIJ cordis: c/d/i w/o erythema , bleeding, nontender    ============================I/O===========================   I&O's Detail    2022 07:  -  2022 07:00  --------------------------------------------------------  IN:    Heparin: 284.5 mL    IV PiggyBack: 50 mL    Milrinone: 250.7 mL    Oral Fluid: 720 mL  Total IN: 1305.2 mL    OUT:    Voided (mL): 2595 mL  Total OUT: 2595 mL    Total NET: -1289.8 mL      2022 07:01  -  2022 22:59  --------------------------------------------------------  IN:    Heparin: 187.5 mL    Milrinone: 163.5 mL    Oral Fluid: 540 mL  Total IN: 891 mL    OUT:    Voided (mL): 1250 mL  Total OUT: 1250 mL    Total NET: -359 mL        ============================ LABS =========================                        13.4   14.71 )-----------( 252      ( 2022 03:39 )             40.5         137  |  102  |  20  ----------------------------<  159<H>  4.5   |  22  |  1.29    Ca    9.1      2022 03:38  Phos  2.9     06-  Mg     1.9         TPro  6.6  /  Alb  3.1<L>  /  TBili  0.7  /  DBili  x   /  AST  21  /  ALT  26  /  AlkPhos  64         LIVER FUNCTIONS - ( 2022 03:38 )  Alb: 3.1 g/dL / Pro: 6.6 g/dL / ALK PHOS: 64 U/L / ALT: 26 U/L / AST: 21 U/L / GGT: x           PT/INR - ( 2022 02:51 )   PT: 13.2 sec;   INR: 1.15 ratio         PTT - ( 2022 02:51 )  PTT:53.0 sec    Blood Gas Venous - Lactate: 1.3 mmol/L (22 @ 05:41)  Blood Gas Venous - Lactate: 1.1 mmol/L (22 @ 14:35)    Urinalysis Basic - ( 2022 00:26 )    Color: Light Yellow / Appearance: Clear / S.014 / pH: x  Gluc: x / Ketone: Negative  / Bili: Negative / Urobili: Negative   Blood: x / Protein: Trace / Nitrite: Negative   Leuk Esterase: Negative / RBC: 1 /hpf / WBC 0 /HPF   Sq Epi: x / Non Sq Epi: 0 /hpf / Bacteria: Negative      ======================Micro/Rad/Cardio=================  Telemetry: Reviewed   EKG: Reviewed  CXR: Reviewed  Culture: Reviewed   ======================================================  PAST MEDICAL & SURGICAL HISTORY:  AV block      Essential hypertension      Chronic HFrEF (heart failure with reduced ejection fraction)      Chronic kidney disease, unspecified CKD stage      CAD (coronary artery disease)      HLD (hyperlipidemia)      Type 2 diabetes mellitus      Artificial cardiac pacemaker        ====================ASSESSMENT ==============  67 y/o male w/ PMHx HTN, HLD, type 2 DM, chronic HFrEF (EF 25-30% by Echo), complete heart block s/p PPM (in , upgraded to BiV-ICD in 2016), CAD (s/p recent BERNABE mid LAD at [facility] on 2022), and stage II CKD (baseline Cr ~1.3) presented with near syncope to OSH found to have 41 episodes of VT on device, s/p unsuccessful VT ablation and transferred to [facility] for management of cardiogenic shock and advanced therapy eval.     Plan:  ====================== NEUROLOGY=====================   AAOx3  - No active issues  - Tylenol PRN for fevers/pain    acetaminophen     Tablet .. 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 RV enlargement with decreased RV systolic function.  - [facility] : patent mLAD stent with slow flow, D1 with 40-50% stenosis  - [facility] : RA 12, PA 70/40 (50), PCWP 22, Milana CO/CI 2.3/1.3, MAP 83 with SVR 2469, PVR 12 PERSAUD  - : RA 10, PA 76/35 (49), PCWP 34, PA sat 57% with Milana CO/CI 3.1/1.6, MAP 71 with SVR 1574, PVR 4.8  - 6/6 RHC: CVP 17, RV 75/4, PA 80/36, PCW 22, CI 1.88. IABP placed and pt transferred to CICU.   - Maintain IABP 1:1, monitor PTT   - Maintain Milrinone .5mcg/kg/min   - Cont hydral 100 TID and ISDN 40TID for AL Reduction, Assisted mean goal 70-80   - monitor hemodynamics and perfusion indices   - HF following, pending OHT Listing     # VTach   - s/p EPS on , unable to perform ablation as no substrate found and did not induce VT because of severely low EF   - Interrogation of ICD @Dr Wilson's office : back-to-back episodes of VT @ 200+ bpm all terminating with ATP. Since last cath pt has had 41 VT episodes (all falling into VF zone)  - Normal device function. BIV pacing 97%. No programming changes made  - c/w Amiodarone  - c/w Toprol 25 daily   - no further VT on inotropes     # CAD   - Chest pain free and compliant with DAPT since last PCI 22/ per discussion with interventional at OSH, ok to DC plavix, last dose   - Cardiac cath @[facility] 22: mLAD 90% s/p BERNABE, LCx mild disease, RCA mild disease s/p diagnostic cardiac cath w/ Dr Wagner on 2022   - Cont ASA and Lipitor     aspirin enteric coated 81 milliGRAM(s) Oral daily  atorvastatin 80 milliGRAM(s) Oral at bedtime  aMIOdarone    Tablet 200 milliGRAM(s) Oral daily  hydrALAZINE 100 milliGRAM(s) Oral every 8 hours  isosorbide   dinitrate Tablet (ISORDIL) 40 milliGRAM(s) Oral three times a day  metoprolol succinate ER 25 milliGRAM(s) Oral daily  milrinone Infusion 0.5 MICROgram(s)/kG/Min (11 mL/Hr) IV Continuous <Continuous>    ===================HEMATOLOGIC/ONC ===================  Secondary Polycythemia Vera   - elevated EPO, hgb in normal range  - Heme following peripherally  - f/u MELINA-2     AC therapy   - Heparin gtt for IABP, low ptt goal     heparin  Infusion 1200 Unit(s)/Hr (12.5 mL/Hr) IV Continuous <Continuous>    ===================== RENAL =========================  JAGRUTI on CKD II  Admitted w/ Cr 2.01 (baseline Cr ~1.3) was downtrending with inotropic support, now rising 1.12->1.29   - likely i/s/o cardiogenic shock given RHC findings and CI of 1.88  - Avoid nephrotoxic agents, NSAIDs. Renally dose meds.  - monitor strict IOs and continue current cardiac support     ==================== GASTROINTESTINAL===================  Consistent Carb Diet:  - Tolerating PO diet    Constipation:  - c/w bowel regimen Miralax and Senna, last BM   - s/p colonoscopy 6/3 with 5 polyps removed and biopsied, benign findings- GI recs for 3 yr follow up colo     bisacodyl Suppository 10 milliGRAM(s) Rectal daily PRN Constipation  pantoprazole    Tablet 40 milliGRAM(s) Oral before breakfast  polyethylene glycol 3350 17 Gram(s) Oral daily  senna 1 Tablet(s) Oral at bedtime  simethicone 80 milliGRAM(s) Chew every 6 hours PRN Gas    =======================    ENDOCRINE  =====================  T2DM (A1C 6.2 on admission)  - Cont. ISS, glucose controlled, monitor FS closely     Gout flare, Left knee (previously R)  - continue allopurinol, s/p prednisone     allopurinol 100 milliGRAM(s) Oral daily  insulin lispro (ADMELOG) corrective regimen sliding scale   SubCutaneous at bedtime  insulin lispro (ADMELOG) corrective regimen sliding scale   SubCutaneous three times a day before meals    ========================INFECTIOUS DISEASE================  RUE Thrombophlebitis  - RUE US Duplex showing superficial thrombus  - s/p ancef      Febrile -8 overnight x1   - UA neg, BCx on  NGTD x2, RVP neg   - follow fever curve  - d/c'd swan        Patient requires continuous monitoring with bedside rhythm monitoring, pulse ox monitoring, and intermittent blood gas analysis. Care plan discussed with ICU care team. Patient remained critical and at risk for life threatening decompensation.  Patient seen, examined and plan discussed with CCU team during rounds.     I have personally provided __35__ minutes of critical care time excluding time spent on separate procedures, in addition to initial critical care time provided by the CICU Attending, Dr. Leonardo      By signing my name below, I, Disha May, attest that this documentation has been prepared under the direction and in the presence of RAMSES Gan   Electronically signed: Licha Love, 22 @ 22:59    I, Samuel Garcia,, personally performed the services described in this documentation. all medical record entries made by the licha were at my direction and in my presence. I have reviewed the chart and agree that the record reflects my personal performance and is accurate and complete  Electronically signed: RAMSES Gan      GOCOOL, LENNOX  MRN-[id number]  Patient is a 66y old  Male who presents with a chief complaint of LVAD/OHT eval (2022 09:16)    HPI:  66M Mixed Ischemic/NICM Cardiomyopathy (EF 25-30% at baseline, s/p BIV-ICD), CAD (medically managed MIs in ,, Most recent stent in 2022 to mLAD) presented with multiple near syncope, found to have 41 episodes of VT and admitted initially to cardiac telemetry for further evaluation/management. Ischemic evaluation without new disease and VT ablation without substrate to complete ablation.   Transferred from [facility] for further management and evaluation for LVAD vs OHT.    (26 May 2022 20:31)      Hospital Course:   Transferred to CCU for further management    24 HOUR EVENTS:    REVIEW OF SYSTEMS:    CONSTITUTIONAL: No weakness, fevers or chills  EYES/ENT: No visual changes;  No vertigo or throat pain   NECK: No pain or stiffness  RESPIRATORY: No cough, wheezing, hemoptysis; No shortness of breath  CARDIOVASCULAR: No chest pain or palpitations  GASTROINTESTINAL: No abdominal or epigastric pain. No nausea, vomiting, or hematemesis; No diarrhea or constipation. No melena or hematochezia.  MSK: + L knee pain  GENITOURINARY: No dysuria, frequency or hematuria  NEUROLOGICAL: No numbness or weakness  SKIN: No itching, rashes      ICU Vital Signs Last 24 Hrs  T(C): 37.6 (2022 20:00), Max: 37.6 (2022 20:00)  T(F): 99.6 (2022 20:00), Max: 99.6 (2022 20:00)  HR: 80 (2022 22:00) (80 - 85)  BP: --  BP(mean): --  ABP: --  ABP(mean): --  RR: 22 (2022 22:00) (17 - 28)  SpO2: 93% (2022 22:00) (91% - 96%)      CVP(mm Hg): 4 (22 @ 18:00) (-4 - 7)  CO: 5 (22 @ 14:30) (5 - 5)  CI: 2.6 (22 @ 14:30) (2.6 - 2.6)  PA: 68/25 (22 @ 18:00) (45/18 - 80/31)  PA(mean): 38 (22 @ 18:00) (27 - 50)  SVR: 1326 (22 @ 14:30) (1326 - 1326)  SVRI: 2550 (22 @ 14:30) (2550 - 2550)       I&O's Summary    2022 07:01  -  2022 07:00  --------------------------------------------------------  IN: 1305.2 mL / OUT: 2595 mL / NET: -1289.8 mL    2022 07:01  -  2022 22:59  --------------------------------------------------------  IN: 891 mL / OUT: 1250 mL / NET: -359 mL        CAPILLARY BLOOD GLUCOSE     POCT Blood Glucose.: 134 mg/dL (2022 20:54)      PHYSICAL EXAM:  GENERAL: No acute distress, well-developed  HEAD:  Atraumatic, Normocephalic  EYES: EOMI, PERRLA, conjunctiva and sclera clear  NECK: Supple, no lymphadenopathy, no JVD  CHEST/LUNG: CTAB; No wheezes, rales, or rhonchi  HEART: Regular rate and rhythm. Normal S1/S2. No murmurs, rubs, or gallops  ABDOMEN: Soft, non-tender, non-distended; normal bowel sounds, no organomegaly  EXTREMITIES:  2+ peripheral pulses b/l, No clubbing, cyanosis, or edema / +RUE Thrombophlebitis,   nontender L knee, no warmth, tenderness, no peripheral edema, no cyanosis, no clubbing   NEUROLOGY: A&O x 3, no focal deficits  SKIN: No rashes or lesions  Incisions: R fem IABP/ RIJ cordis: c/d/i w/o erythema , bleeding, nontender    ============================I/O===========================   I&O's Detail    2022 07:  -  2022 07:00  --------------------------------------------------------  IN:    Heparin: 284.5 mL    IV PiggyBack: 50 mL    Milrinone: 250.7 mL    Oral Fluid: 720 mL  Total IN: 1305.2 mL    OUT:    Voided (mL): 2595 mL  Total OUT: 2595 mL    Total NET: -1289.8 mL      2022 07:01  -  2022 22:59  --------------------------------------------------------  IN:    Heparin: 187.5 mL    Milrinone: 163.5 mL    Oral Fluid: 540 mL  Total IN: 891 mL    OUT:    Voided (mL): 1250 mL  Total OUT: 1250 mL    Total NET: -359 mL        ============================ LABS =========================                        13.4   14.71 )-----------( 252      ( 2022 03:39 )             40.5         137  |  102  |  20  ----------------------------<  159<H>  4.5   |  22  |  1.29    Ca    9.1      2022 03:38  Phos  2.9     06-  Mg     1.9         TPro  6.6  /  Alb  3.1<L>  /  TBili  0.7  /  DBili  x   /  AST  21  /  ALT  26  /  AlkPhos  64         LIVER FUNCTIONS - ( 2022 03:38 )  Alb: 3.1 g/dL / Pro: 6.6 g/dL / ALK PHOS: 64 U/L / ALT: 26 U/L / AST: 21 U/L / GGT: x           PT/INR - ( 2022 02:51 )   PT: 13.2 sec;   INR: 1.15 ratio         PTT - ( 2022 02:51 )  PTT:53.0 sec    Blood Gas Venous - Lactate: 1.3 mmol/L (22 @ 05:41)  Blood Gas Venous - Lactate: 1.1 mmol/L (22 @ 14:35)    Urinalysis Basic - ( 2022 00:26 )    Color: Light Yellow / Appearance: Clear / S.014 / pH: x  Gluc: x / Ketone: Negative  / Bili: Negative / Urobili: Negative   Blood: x / Protein: Trace / Nitrite: Negative   Leuk Esterase: Negative / RBC: 1 /hpf / WBC 0 /HPF   Sq Epi: x / Non Sq Epi: 0 /hpf / Bacteria: Negative      ======================Micro/Rad/Cardio=================  Telemetry: Reviewed   EKG: Reviewed  CXR: Reviewed  Culture: Reviewed   ======================================================  PAST MEDICAL & SURGICAL HISTORY:  AV block      Essential hypertension      Chronic HFrEF (heart failure with reduced ejection fraction)      Chronic kidney disease, unspecified CKD stage      CAD (coronary artery disease)      HLD (hyperlipidemia)      Type 2 diabetes mellitus      Artificial cardiac pacemaker        ====================ASSESSMENT ==============  65 y/o male w/ PMHx HTN, HLD, type 2 DM, chronic HFrEF (EF 25-30% by Echo), complete heart block s/p PPM (in , upgraded to BiV-ICD in 2016), CAD (s/p recent BERNABE mid LAD at [facility] on 2022), and stage II CKD (baseline Cr ~1.3) presented with near syncope to OSH found to have 41 episodes of VT on device, s/p unsuccessful VT ablation and transferred to [facility] for management of cardiogenic shock and advanced therapy eval.     Plan:  ====================== NEUROLOGY=====================   AAOx3  - No active issues  - Tylenol PRN for fevers/pain    acetaminophen     Tablet .. 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 RV enlargement with decreased RV systolic function.  - [facility] : patent mLAD stent with slow flow, D1 with 40-50% stenosis  - [facility] : RA 12, PA 70/40 (50), PCWP 22, Milana CO/CI 2.3/1.3, MAP 83 with SVR 2469, PVR 12 PERSAUD  - : RA 10, PA 76/35 (49), PCWP 34, PA sat 57% with Milana CO/CI 3.1/1.6, MAP 71 with SVR 1574, PVR 4.8  - 6/6 RHC: CVP 17, RV 75/4, PA 80/36, PCW 22, CI 1.88. IABP placed and pt transferred to CICU.   - Maintain IABP 1:1, monitor PTT   - Maintain Milrinone .5mcg/kg/min   - Cont hydral 100 TID and ISDN 40TID for AL Reduction, Assisted mean goal 70-80   - monitor hemodynamics and perfusion indices   - HF following, pending OHT Listing     # VTach   - s/p EPS on , unable to perform ablation as no substrate found and did not induce VT because of severely low EF   - Interrogation of ICD @Dr Wilson's office : back-to-back episodes of VT @ 200+ bpm all terminating with ATP. Since last cath pt has had 41 VT episodes (all falling into VF zone)  - Normal device function. BIV pacing 97%. No programming changes made  - c/w Amiodarone  - c/w Toprol 25 daily   - no further VT on inotropes     # CAD   - Chest pain free and compliant with DAPT since last PCI 22/ per discussion with interventional at OSH, ok to DC plavix, last dose   - Cardiac cath @[facility] 22: mLAD 90% s/p BERNABE, LCx mild disease, RCA mild disease s/p diagnostic cardiac cath w/ Dr Wagner on 2022   - Cont ASA and Lipitor     aspirin enteric coated 81 milliGRAM(s) Oral daily  atorvastatin 80 milliGRAM(s) Oral at bedtime  aMIOdarone    Tablet 200 milliGRAM(s) Oral daily  hydrALAZINE 100 milliGRAM(s) Oral every 8 hours  isosorbide   dinitrate Tablet (ISORDIL) 40 milliGRAM(s) Oral three times a day  metoprolol succinate ER 25 milliGRAM(s) Oral daily  milrinone Infusion 0.5 MICROgram(s)/kG/Min (11 mL/Hr) IV Continuous <Continuous>    ===================HEMATOLOGIC/ONC ===================  Secondary Polycythemia Vera   - elevated EPO, hgb in normal range  - Heme following peripherally  - f/u MELINA-2     AC therapy   - Heparin gtt for IABP, low ptt goal     heparin  Infusion 1200 Unit(s)/Hr (12.5 mL/Hr) IV Continuous <Continuous>    ===================== RENAL =========================  JAGRUTI on CKD II  Admitted w/ Cr 2.01 (baseline Cr ~1.3) was downtrending with inotropic support, now rising 1.12->1.29   - likely i/s/o cardiogenic shock given RHC findings and CI of 1.88  - Avoid nephrotoxic agents, NSAIDs. Renally dose meds.  - monitor strict IOs and continue current cardiac support     ==================== GASTROINTESTINAL===================  Consistent Carb Diet:  - Tolerating PO diet    Constipation:  - c/w bowel regimen Miralax and Senna, last BM   - s/p colonoscopy 6/3 with 5 polyps removed and biopsied, benign findings- GI recs for 3 yr follow up colo     bisacodyl Suppository 10 milliGRAM(s) Rectal daily PRN Constipation  pantoprazole    Tablet 40 milliGRAM(s) Oral before breakfast  polyethylene glycol 3350 17 Gram(s) Oral daily  senna 1 Tablet(s) Oral at bedtime  simethicone 80 milliGRAM(s) Chew every 6 hours PRN Gas    =======================    ENDOCRINE  =====================  T2DM (A1C 6.2 on admission)  - Cont. ISS, glucose controlled, monitor FS closely     Gout flare, Left knee (previously R)  - continue allopurinol, s/p prednisone     allopurinol 100 milliGRAM(s) Oral daily  insulin lispro (ADMELOG) corrective regimen sliding scale   SubCutaneous at bedtime  insulin lispro (ADMELOG) corrective regimen sliding scale   SubCutaneous three times a day before meals    ========================INFECTIOUS DISEASE================  RUE Thrombophlebitis  - RUE US Duplex showing superficial thrombus  - s/p ancef      Febrile -8 overnight x1   - UA neg, BCx on  NGTD x2, RVP neg   - follow fever curve  - d/c'd swan        Patient requires continuous monitoring with bedside rhythm monitoring, pulse ox monitoring, and intermittent blood gas analysis. Care plan discussed with ICU care team. Patient remained critical and at risk for life threatening decompensation.  Patient seen, examined and plan discussed with CCU team during rounds.     I have personally provided __35__ minutes of critical care time excluding time spent on separate procedures, in addition to initial critical care time provided by the CICU Attending, Dr. Leonardo      By signing my name below, I, Disha May, attest that this documentation has been prepared under the direction and in the presence of RAMSES Gan   Electronically signed: Licha Love, 22 @ 22:59    ISamuel,, personally performed the services described in this documentation. all medical record entries made by the licha were at my direction and in my presence. I have reviewed the chart and agree that the record reflects my personal performance and is accurate and complete  Electronically signed: RAMSES Gan Fellow Attestation   No notable fevers overnight, no changes in management

## 2022-06-09 NOTE — PROGRESS NOTE ADULT - ASSESSMENT
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 LHC did not reveal new obstructive CAD and he underwent EPS which did not reveal endocardial substrate amenable for ablation. RHC revealed severely depressed cardiac output and he was transferred to [facility] 5/26 for advanced therapies evaluation.    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 He improved significantly with sequential uptitration of inotropes and increased pacing rate, but on 6/6 he had worsening JAGRUTI. He is s/p RHC which revealed severely elevated filling pressures, severe cpcPH, and CI of 1.9 on milrinone 0.5. IABP was placed and his renal function/PAPs have improved. He is MCS dependent and was inadequately supported with high dose inotropes, so is now listed UNOS status 2e for OHT, awaiting suitable donor. He was febrile overnight but is otherwise stable.    Review of studies  TTE 5/21: LV 5.2 cm, LVEF 10-15%, LVOT VTI 10 cm, moderate RV dysfunction, mild AI, minimal MR  EKG: a-BiV paced  [facility] 5/23: patent mLAD stent with slow flow, D1 with 40-50% stenosis, mild disease otherwise  RHC 5/26: RA 12, PA 70/40 (50), PCWP 22, Milana CO/CI 2.3/1.3, MAP 83 with SVR 2469, PVR 12 PERSAUD    Hemodynamics  5/27 (milrinone 0.25): RA 10, PA 76/35 (49), PCWP 34, PA sat 57% with Milana CO/CI 3.1/1.6, MAP 71 with SVR 1574, PVR 4.8  5/28 (on milrinone 0.375): RA 7, PA 63/31, PCWP 26, PA sat 72.8% with Milana CO/CI 4.9/2.5 (CI later dropped to 1.6 in the afternoon), MAP 70 with SVR 1039, PVR 2.9  5/29 (on milrinone 0.5): RA 8, PA 75/32 (50), PCWP 28, PA sat 74% with Milana CO/CI 5.0/2.6, MAP 77 with SVR 1200, PVR 4.4  5/29 (on milrinone 0.5 and nipride to 3 mcg/kg/min): RA 6, PA 59/31 (40), PCWP 30, PA sat 79% with Milana CO/CI 7.0/3.6, BP 97/57 (MAP 70) with , PVR 1.4  6/6 RHC (mil 0.5): RA 11 (v13), RV 75/17, PA 80/36/52, PCWP 24 (v29), PA sat 59.5%, CO/CI (F) 3.58/1.88  6/7 (mil 0.5, IABP 1:1): CVP 5, PA 66/32/45, PA sat 65.7%, CO/CI (F) 4.0/2.1, SVR 2000  6/8 (mil 0.5, IABP 1:1): CVP 1, PA 46/19/28, PA sat 72.7%, CO/CI (F) 5.6/2.9, SVR 1257 65 YO M with a history of ACC/AHA Stage D mixed 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 5/20 with near syncope in setting of worsening HF symptoms and found to have 41 episodes of VT, many terminating with ATP. LHC did not reveal new obstructive CAD and he underwent EPS which did not reveal endocardial substrate amenable for ablation. RHC revealed severely depressed cardiac output and he was transferred to [facility] 5/26 for advanced therapies evaluation.    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 He improved significantly with sequential uptitration of inotropes and increased pacing rate, but on 6/6 he had worsening JAGRUTI. He is s/p RHC which revealed severely elevated filling pressures, severe cpcPH, and CI of 1.9 on milrinone 0.5. IABP was placed and his renal function/PAPs have improved. He is MCS dependent and was inadequately supported with high dose inotropes, so is now listed UNOS status 2e for OHT, awaiting suitable donor. He was febrile overnight but is otherwise stable.    Review of studies  TTE 5/21: LV 5.2 cm, LVEF 10-15%, LVOT VTI 10 cm, moderate RV dysfunction, mild AI, minimal MR  EKG: a-BiV paced  [facility] 5/23: patent mLAD stent with slow flow, D1 with 40-50% stenosis, mild disease otherwise  RHC 5/26: RA 12, PA 70/40 (50), PCWP 22, Milana CO/CI 2.3/1.3, MAP 83 with SVR 2469, PVR 12 PERSAUD    Hemodynamics  5/27 (milrinone 0.25): RA 10, PA 76/35 (49), PCWP 34, PA sat 57% with Milana CO/CI 3.1/1.6, MAP 71 with SVR 1574, PVR 4.8  5/28 (on milrinone 0.375): RA 7, PA 63/31, PCWP 26, PA sat 72.8% with Milana CO/CI 4.9/2.5 (CI later dropped to 1.6 in the afternoon), MAP 70 with SVR 1039, PVR 2.9  5/29 (on milrinone 0.5): RA 8, PA 75/32 (50), PCWP 28, PA sat 74% with Milana CO/CI 5.0/2.6, MAP 77 with SVR 1200, PVR 4.4  5/29 (on milrinone 0.5 and nipride to 3 mcg/kg/min): RA 6, PA 59/31 (40), PCWP 30, PA sat 79% with Milana CO/CI 7.0/3.6, BP 97/57 (MAP 70) with , PVR 1.4  6/6 RHC (mil 0.5): RA 11 (v13), RV 75/17, PA 80/36/52, PCWP 24 (v29), PA sat 59.5%, CO/CI (F) 3.58/1.88  6/7 (mil 0.5, IABP 1:1): CVP 5, PA 66/32/45, PA sat 65.7%, CO/CI (F) 4.0/2.1, SVR 2000  6/8 (mil 0.5, IABP 1:1): CVP 1, PA 46/19/28, PA sat 72.7%, CO/CI (F) 5.6/2.9, SVR 1257  6/8 PM (mil 0.5, IABP 1:1): CVP 4, PA 68/25/38, PA sat 68%, CO/CI (f) 5/2.6, SVR 1326

## 2022-06-09 NOTE — PROGRESS NOTE ADULT - PROBLEM SELECTOR PLAN 1
- continue IABP 1:1  - continue milrinone 0.5 mcg/kg/min  - continue HDZN 100 mg TID and ISDN 20 mg TID for afterload reduction  - trend perfusion labs (MvO2, lactate, and LFTs)  - if repeat hemodynamics are stable this afternoon, can pull swan - continue IABP 1:1  - continue milrinone 0.5 mcg/kg/min  - continue HDZN 100 mg TID and ISDN 20 mg TID for afterload reduction, hold for assisted mean < 70   -swan pulled on 6/8/22

## 2022-06-10 LAB
ALBUMIN SERPL ELPH-MCNC: 2.9 G/DL — LOW (ref 3.3–5)
ALBUMIN SERPL ELPH-MCNC: 3.2 G/DL — LOW (ref 3.3–5)
ALP SERPL-CCNC: 64 U/L — SIGNIFICANT CHANGE UP (ref 40–120)
ALP SERPL-CCNC: 67 U/L — SIGNIFICANT CHANGE UP (ref 40–120)
ALT FLD-CCNC: 24 U/L — SIGNIFICANT CHANGE UP (ref 10–45)
ALT FLD-CCNC: 27 U/L — SIGNIFICANT CHANGE UP (ref 10–45)
ANION GAP SERPL CALC-SCNC: 10 MMOL/L — SIGNIFICANT CHANGE UP (ref 5–17)
ANION GAP SERPL CALC-SCNC: 12 MMOL/L — SIGNIFICANT CHANGE UP (ref 5–17)
APTT BLD: 55.9 SEC — HIGH (ref 27.5–35.5)
AST SERPL-CCNC: 21 U/L — SIGNIFICANT CHANGE UP (ref 10–40)
AST SERPL-CCNC: 28 U/L — SIGNIFICANT CHANGE UP (ref 10–40)
BASE EXCESS BLDV CALC-SCNC: -0.1 MMOL/L — SIGNIFICANT CHANGE UP (ref -2–2)
BASOPHILS # BLD AUTO: 0.09 K/UL — SIGNIFICANT CHANGE UP (ref 0–0.2)
BASOPHILS NFR BLD AUTO: 0.7 % — SIGNIFICANT CHANGE UP (ref 0–2)
BILIRUB SERPL-MCNC: 0.6 MG/DL — SIGNIFICANT CHANGE UP (ref 0.2–1.2)
BILIRUB SERPL-MCNC: 0.6 MG/DL — SIGNIFICANT CHANGE UP (ref 0.2–1.2)
BUN SERPL-MCNC: 19 MG/DL — SIGNIFICANT CHANGE UP (ref 7–23)
BUN SERPL-MCNC: 21 MG/DL — SIGNIFICANT CHANGE UP (ref 7–23)
CA-I SERPL-SCNC: 1.21 MMOL/L — SIGNIFICANT CHANGE UP (ref 1.15–1.33)
CALCIUM SERPL-MCNC: 8.8 MG/DL — SIGNIFICANT CHANGE UP (ref 8.4–10.5)
CALCIUM SERPL-MCNC: 9 MG/DL — SIGNIFICANT CHANGE UP (ref 8.4–10.5)
CHLORIDE BLDV-SCNC: 100 MMOL/L — SIGNIFICANT CHANGE UP (ref 96–108)
CHLORIDE SERPL-SCNC: 100 MMOL/L — SIGNIFICANT CHANGE UP (ref 96–108)
CHLORIDE SERPL-SCNC: 100 MMOL/L — SIGNIFICANT CHANGE UP (ref 96–108)
CO2 BLDV-SCNC: 25 MMOL/L — SIGNIFICANT CHANGE UP (ref 22–26)
CO2 SERPL-SCNC: 20 MMOL/L — LOW (ref 22–31)
CO2 SERPL-SCNC: 22 MMOL/L — SIGNIFICANT CHANGE UP (ref 22–31)
CREAT SERPL-MCNC: 1.22 MG/DL — SIGNIFICANT CHANGE UP (ref 0.5–1.3)
CREAT SERPL-MCNC: 1.44 MG/DL — HIGH (ref 0.5–1.3)
EGFR: 54 ML/MIN/1.73M2 — LOW
EGFR: 66 ML/MIN/1.73M2 — SIGNIFICANT CHANGE UP
EOSINOPHIL # BLD AUTO: 0.33 K/UL — SIGNIFICANT CHANGE UP (ref 0–0.5)
EOSINOPHIL NFR BLD AUTO: 2.4 % — SIGNIFICANT CHANGE UP (ref 0–6)
GAS PNL BLDV: 127 MMOL/L — LOW (ref 136–145)
GAS PNL BLDV: SIGNIFICANT CHANGE UP
GAS PNL BLDV: SIGNIFICANT CHANGE UP
GLUCOSE BLDC GLUCOMTR-MCNC: 123 MG/DL — HIGH (ref 70–99)
GLUCOSE BLDC GLUCOMTR-MCNC: 129 MG/DL — HIGH (ref 70–99)
GLUCOSE BLDC GLUCOMTR-MCNC: 134 MG/DL — HIGH (ref 70–99)
GLUCOSE BLDC GLUCOMTR-MCNC: 143 MG/DL — HIGH (ref 70–99)
GLUCOSE BLDV-MCNC: 135 MG/DL — HIGH (ref 70–99)
GLUCOSE SERPL-MCNC: 132 MG/DL — HIGH (ref 70–99)
GLUCOSE SERPL-MCNC: 166 MG/DL — HIGH (ref 70–99)
HCO3 BLDV-SCNC: 24 MMOL/L — SIGNIFICANT CHANGE UP (ref 22–29)
HCT VFR BLD CALC: 38.2 % — LOW (ref 39–50)
HCT VFR BLDA CALC: 40 % — SIGNIFICANT CHANGE UP (ref 39–51)
HGB BLD CALC-MCNC: 13.3 G/DL — SIGNIFICANT CHANGE UP (ref 12.6–17.4)
HGB BLD-MCNC: 13 G/DL — SIGNIFICANT CHANGE UP (ref 13–17)
IMM GRANULOCYTES NFR BLD AUTO: 0.7 % — SIGNIFICANT CHANGE UP (ref 0–1.5)
INR BLD: 1.17 RATIO — HIGH (ref 0.88–1.16)
LACTATE BLDV-MCNC: 1 MMOL/L — SIGNIFICANT CHANGE UP (ref 0.7–2)
LACTATE SERPL-SCNC: 1.1 MMOL/L — SIGNIFICANT CHANGE UP (ref 0.7–2)
LYMPHOCYTES # BLD AUTO: 18.2 % — SIGNIFICANT CHANGE UP (ref 13–44)
LYMPHOCYTES # BLD AUTO: 2.46 K/UL — SIGNIFICANT CHANGE UP (ref 1–3.3)
MAGNESIUM SERPL-MCNC: 1.8 MG/DL — SIGNIFICANT CHANGE UP (ref 1.6–2.6)
MAGNESIUM SERPL-MCNC: 2 MG/DL — SIGNIFICANT CHANGE UP (ref 1.6–2.6)
MCHC RBC-ENTMCNC: 30.7 PG — SIGNIFICANT CHANGE UP (ref 27–34)
MCHC RBC-ENTMCNC: 34 GM/DL — SIGNIFICANT CHANGE UP (ref 32–36)
MCV RBC AUTO: 90.1 FL — SIGNIFICANT CHANGE UP (ref 80–100)
MONOCYTES # BLD AUTO: 1.73 K/UL — HIGH (ref 0–0.9)
MONOCYTES NFR BLD AUTO: 12.8 % — SIGNIFICANT CHANGE UP (ref 2–14)
NEUTROPHILS # BLD AUTO: 8.85 K/UL — HIGH (ref 1.8–7.4)
NEUTROPHILS NFR BLD AUTO: 65.2 % — SIGNIFICANT CHANGE UP (ref 43–77)
NRBC # BLD: 0 /100 WBCS — SIGNIFICANT CHANGE UP (ref 0–0)
PCO2 BLDV: 36 MMHG — LOW (ref 42–55)
PH BLDV: 7.43 — SIGNIFICANT CHANGE UP (ref 7.32–7.43)
PHOSPHATE SERPL-MCNC: 3.2 MG/DL — SIGNIFICANT CHANGE UP (ref 2.5–4.5)
PHOSPHATE SERPL-MCNC: 3.3 MG/DL — SIGNIFICANT CHANGE UP (ref 2.5–4.5)
PLATELET # BLD AUTO: 218 K/UL — SIGNIFICANT CHANGE UP (ref 150–400)
PO2 BLDV: 43 MMHG — SIGNIFICANT CHANGE UP (ref 25–45)
POTASSIUM BLDV-SCNC: 4.2 MMOL/L — SIGNIFICANT CHANGE UP (ref 3.5–5.1)
POTASSIUM SERPL-MCNC: 4.2 MMOL/L — SIGNIFICANT CHANGE UP (ref 3.5–5.3)
POTASSIUM SERPL-MCNC: 4.4 MMOL/L — SIGNIFICANT CHANGE UP (ref 3.5–5.3)
POTASSIUM SERPL-SCNC: 4.2 MMOL/L — SIGNIFICANT CHANGE UP (ref 3.5–5.3)
POTASSIUM SERPL-SCNC: 4.4 MMOL/L — SIGNIFICANT CHANGE UP (ref 3.5–5.3)
PROT SERPL-MCNC: 6.3 G/DL — SIGNIFICANT CHANGE UP (ref 6–8.3)
PROT SERPL-MCNC: 6.4 G/DL — SIGNIFICANT CHANGE UP (ref 6–8.3)
PROTHROM AB SERPL-ACNC: 13.6 SEC — HIGH (ref 10.5–13.4)
RBC # BLD: 4.24 M/UL — SIGNIFICANT CHANGE UP (ref 4.2–5.8)
RBC # FLD: 15.7 % — HIGH (ref 10.3–14.5)
SAO2 % BLDV: 71.8 % — SIGNIFICANT CHANGE UP (ref 67–88)
SODIUM SERPL-SCNC: 132 MMOL/L — LOW (ref 135–145)
SODIUM SERPL-SCNC: 132 MMOL/L — LOW (ref 135–145)
WBC # BLD: 13.55 K/UL — HIGH (ref 3.8–10.5)
WBC # FLD AUTO: 13.55 K/UL — HIGH (ref 3.8–10.5)

## 2022-06-10 PROCEDURE — 71045 X-RAY EXAM CHEST 1 VIEW: CPT | Mod: 26

## 2022-06-10 PROCEDURE — 99233 SBSQ HOSP IP/OBS HIGH 50: CPT

## 2022-06-10 PROCEDURE — 99232 SBSQ HOSP IP/OBS MODERATE 35: CPT

## 2022-06-10 PROCEDURE — 99292 CRITICAL CARE ADDL 30 MIN: CPT

## 2022-06-10 RX ORDER — SODIUM CHLORIDE 9 MG/ML
500 INJECTION, SOLUTION INTRAVENOUS ONCE
Refills: 0 | Status: COMPLETED | OUTPATIENT
Start: 2022-06-10 | End: 2022-06-10

## 2022-06-10 RX ORDER — MAGNESIUM SULFATE 500 MG/ML
1 VIAL (ML) INJECTION ONCE
Refills: 0 | Status: COMPLETED | OUTPATIENT
Start: 2022-06-10 | End: 2022-06-10

## 2022-06-10 RX ORDER — SODIUM CHLORIDE 9 MG/ML
250 INJECTION, SOLUTION INTRAVENOUS ONCE
Refills: 0 | Status: DISCONTINUED | OUTPATIENT
Start: 2022-06-10 | End: 2022-06-10

## 2022-06-10 RX ORDER — SODIUM CHLORIDE 9 MG/ML
250 INJECTION INTRAMUSCULAR; INTRAVENOUS; SUBCUTANEOUS ONCE
Refills: 0 | Status: DISCONTINUED | OUTPATIENT
Start: 2022-06-10 | End: 2022-06-10

## 2022-06-10 RX ADMIN — HEPARIN SODIUM 12.5 UNIT(S)/HR: 5000 INJECTION INTRAVENOUS; SUBCUTANEOUS at 06:06

## 2022-06-10 RX ADMIN — POLYETHYLENE GLYCOL 3350 17 GRAM(S): 17 POWDER, FOR SOLUTION ORAL at 11:43

## 2022-06-10 RX ADMIN — Medication 650 MILLIGRAM(S): at 03:25

## 2022-06-10 RX ADMIN — Medication 100 MILLIGRAM(S): at 15:24

## 2022-06-10 RX ADMIN — ATORVASTATIN CALCIUM 80 MILLIGRAM(S): 80 TABLET, FILM COATED ORAL at 21:21

## 2022-06-10 RX ADMIN — LIDOCAINE 1 PATCH: 4 CREAM TOPICAL at 15:25

## 2022-06-10 RX ADMIN — Medication 100 MILLIGRAM(S): at 21:21

## 2022-06-10 RX ADMIN — INFLUENZA VIRUS VACCINE 0.5 MILLILITER(S): 15; 15; 15; 15 SUSPENSION INTRAMUSCULAR at 06:03

## 2022-06-10 RX ADMIN — ISOSORBIDE DINITRATE 40 MILLIGRAM(S): 5 TABLET ORAL at 11:43

## 2022-06-10 RX ADMIN — SODIUM CHLORIDE 500 MILLILITER(S): 9 INJECTION, SOLUTION INTRAVENOUS at 11:26

## 2022-06-10 RX ADMIN — ISOSORBIDE DINITRATE 40 MILLIGRAM(S): 5 TABLET ORAL at 06:02

## 2022-06-10 RX ADMIN — Medication 81 MILLIGRAM(S): at 11:43

## 2022-06-10 RX ADMIN — LIDOCAINE 1 PATCH: 4 CREAM TOPICAL at 19:16

## 2022-06-10 RX ADMIN — Medication 100 GRAM(S): at 06:02

## 2022-06-10 RX ADMIN — Medication 25 MILLIGRAM(S): at 06:03

## 2022-06-10 RX ADMIN — AMIODARONE HYDROCHLORIDE 200 MILLIGRAM(S): 400 TABLET ORAL at 06:03

## 2022-06-10 RX ADMIN — Medication 650 MILLIGRAM(S): at 02:25

## 2022-06-10 RX ADMIN — ISOSORBIDE DINITRATE 40 MILLIGRAM(S): 5 TABLET ORAL at 17:35

## 2022-06-10 RX ADMIN — PANTOPRAZOLE SODIUM 40 MILLIGRAM(S): 20 TABLET, DELAYED RELEASE ORAL at 06:02

## 2022-06-10 RX ADMIN — Medication 100 MILLIGRAM(S): at 06:02

## 2022-06-10 RX ADMIN — CHLORHEXIDINE GLUCONATE 1 APPLICATION(S): 213 SOLUTION TOPICAL at 06:06

## 2022-06-10 RX ADMIN — Medication 100 MILLIGRAM(S): at 11:43

## 2022-06-10 RX ADMIN — MILRINONE LACTATE 11 MICROGRAM(S)/KG/MIN: 1 INJECTION, SOLUTION INTRAVENOUS at 10:11

## 2022-06-10 NOTE — CHART NOTE - NSCHARTNOTEFT_GEN_A_CORE
Nutrition Follow Up Note  Patient seen for: Nutrition follow-up    Chart reviewed, events noted. Per chart: 66M, PMH of ACC/AHA Stage D mixed NICM/ICM, well controlled DM2 (A1c 6.2%), and CKD III. admitted with cardiogenic shock and many episodes of VT. Underwent evaluation for advanced therapies. Found to have elevated pulmonary pressures but better on inotropes but he remained inadequately supported. IABP placed 2022 and was listed for heart transplant UNOS status 2E. Continue IABP and inotropes. Fever overnight  - UNOS status 7 until cultures resulted.    Source: EMR, Team    -If unable to interview patient: [] Trach/Vent/BiPAP  [] Disoriented/confused/inappropriate to interview    Diet Order:   Diet, Consistent Carbohydrate w/Evening Snack:   Low Sodium (22)    Is current diet order appropriate/adequate? [] Yes  []  No:     PO intake :   [] >75%  Adequate    [] 50-75%  Fair       [] <50%  Poor    Nutrition-Related Events:  - Continues on milrinone gtt  - Insulin Sliding Scale ordered to optimize BG  - Ongoing diet education in setting of AT evaluation    GI:  Last BM .   Bowel Regimen? [x] Yes (miralax, senna, dulcolax - PRN)  [] No  - Off abx at this time    Weight in k.1 (06-10), 72.4 (-), 74.2 (-), 77.5 (-), 73.5 (-), 74 (), 79.8 ()    Admit Dosing Weight: 162 Ibs/73.5 kg (-)  Height: 5'10" (177.8 cm)  BMI (kg/m2): 23.3 (-)  IBW: 166 Ibs/75.3 kg  UBW PTA: ~168 lbs/76.2 kg    MEDICATIONS  (STANDING):  allopurinol  aMIOdarone    Tablet  atorvastatin  hydrALAZINE  insulin lispro (ADMELOG) corrective regimen sliding scale  insulin lispro (ADMELOG) corrective regimen sliding scale  isosorbide   dinitrate Tablet (ISORDIL)  metoprolol succinate ER  milrinone Infusion  pantoprazole    Tablet  polyethylene glycol 3350  senna    Pertinent Labs: 06-10 @ 04:35: Na 132<L>, BUN 21, Cr 1.44<H>, <H>, K+ 4.2, Phos 3.2, Mg 1.8, Alk Phos 64, ALT/SGPT 24, AST/SGOT 21, HbA1c --    A1C with Estimated Average Glucose Result: 6.2 % (22 @ 14:17)  A1C with Estimated Average Glucose Result: 6.9 % (22 @ 15:40)  A1C with Estimated Average Glucose Result: 6.7 % (22 @ 15:05)    Finger Sticks:  POCT Blood Glucose.: 134 mg/dL ( @ 20:54)  POCT Blood Glucose.: 121 mg/dL ( @ 16:36)  POCT Blood Glucose.: 149 mg/dL ( @ 11:22)  POCT Blood Glucose.: 120 mg/dL ( @ 08:14)    Triglycerides, Serum: 231 mg/dL (22 @ 14:17)    Skin per nursing documentation: no pressure injuries noted  Edema: none    Estimated Nutritional Needs  Based on dosing weight 162 Ibs/73.5 kg (-)  Energy Needs (25-30 kcals/kg): 5960-8946   Protein Needs (1.0-1.4 g/kg): 73.5-102.9       Previous Nutrition Diagnosis: Food & Nutrition Related Knowledge Deficit  Nutrition Diagnosis is: [x] ongoing  - addressed with ongoing diet education for LVAD/heart transplant evaluation    New Nutrition Diagnosis: [] Not applicable    Nutrition Care Plan:  [] In Progress  [] Achieved  [] Not applicable      Recommendations:      [] Continue current diet order        [] Add oral nutrition supplement:  [] Discontinue current diet order. Recommend:   [] Add micronutrient supplementation:   [] Continue current micronutrient supplementation:   [] Other:     Monitoring and Evaluation:   Continue to monitor nutritional intake, tolerance to diet prescription, weights, labs, skin integrity  RD remains available upon request and will follow up per protocol Nutrition Follow Up Note  Patient seen for: Nutrition follow-up    Chart reviewed, events noted. Per chart: 66M, PMH of ACC/AHA Stage D mixed NICM/ICM, well controlled DM2 (A1c 6.2%), and CKD III. admitted with cardiogenic shock and many episodes of VT. Underwent evaluation for advanced therapies. Found to have elevated pulmonary pressures but better on inotropes but he remained inadequately supported. IABP placed 2022 and was listed for heart transplant UNOS status 2E. Continue IABP and inotropes. Fever overnight  - UNOS status 7 until cultures resulted.    Source: EMR, Team, Pt    Diet Order:   Diet, Consistent Carbohydrate w/Evening Snack:   Low Sodium (22)    Is current diet order appropriate/adequate? [x] Yes  []  No:     PO intake :   [x] >75%  Adequate    [] 50-75%  Fair       [] <50%  Poor    Nutrition-Related Events:  - Continues on milrinone gtt  - Insulin Sliding Scale ordered to optimize BG  - Ongoing diet education in setting of AT evaluation    GI:  Last BM .   Bowel Regimen? [x] Yes (miralax, senna, dulcolax - PRN)  [] No  - Off abx at this time    Weight in k.1 (-10), 72.4 (-), 74.2 (-), 77.5 (), 73.5 (-), 74 (-), 79.8 ()    Admit Dosing Weight: 162 Ibs/73.5 kg (-)  Height: 5'10" (177.8 cm)  BMI (kg/m2): 23.3 (-)  IBW: 166 Ibs/75.3 kg  UBW PTA: ~168 lbs/76.2 kg    MEDICATIONS  (STANDING):  allopurinol  aMIOdarone    Tablet  atorvastatin  hydrALAZINE  insulin lispro (ADMELOG) corrective regimen sliding scale  insulin lispro (ADMELOG) corrective regimen sliding scale  isosorbide   dinitrate Tablet (ISORDIL)  metoprolol succinate ER  milrinone Infusion  pantoprazole    Tablet  polyethylene glycol 3350  senna    Pertinent Labs: 06-10 @ 04:35: Na 132<L>, BUN 21, Cr 1.44<H>, <H>, K+ 4.2, Phos 3.2, Mg 1.8, Alk Phos 64, ALT/SGPT 24, AST/SGOT 21, HbA1c --    A1C with Estimated Average Glucose Result: 6.2 % (22 @ 14:17)  A1C with Estimated Average Glucose Result: 6.9 % (22 @ 15:40)  A1C with Estimated Average Glucose Result: 6.7 % (22 @ 15:05)    Finger Sticks:  POCT Blood Glucose.: 134 mg/dL ( @ 20:54)  POCT Blood Glucose.: 121 mg/dL ( @ 16:36)  POCT Blood Glucose.: 149 mg/dL ( @ 11:22)  POCT Blood Glucose.: 120 mg/dL ( @ 08:14)    Triglycerides, Serum: 231 mg/dL (22 @ 14:17)    Skin per nursing documentation: no pressure injuries noted  Edema: none    Estimated Nutritional Needs  Based on dosing weight 162 Ibs/73.5 kg ()  Energy Needs (25-30 kcals/kg): 0306-1976   Protein Needs (1.0-1.4 g/kg): 73.5-102.9     Previous Nutrition Diagnosis: Food & Nutrition Related Knowledge Deficit  Nutrition Diagnosis is: [x] ongoing  - addressed with ongoing diet education for LVAD/heart transplant evaluation    New Nutrition Diagnosis: [x] Not applicable    Nutrition Care Plan:  [x] In Progress  [] Achieved  [] Not applicable      Recommendations:      1. Continue current diet as ordered   2. Dietary preferences obtained; RD to provide as able  3. Diet education ongoing; RD remains available for additional information PRN    Monitoring and Evaluation:   Continue to monitor nutritional intake, tolerance to diet prescription, weights, labs, skin integrity  RD remains available upon request and will follow up per protocol    Sallie Serna, MS, RD, CDN, [facility]  pager #[phone]

## 2022-06-10 NOTE — PROGRESS NOTE ADULT - SUBJECTIVE AND OBJECTIVE BOX
Subjective:    Medications:  acetaminophen     Tablet .. 650 milliGRAM(s) Oral every 6 hours PRN  allopurinol 100 milliGRAM(s) Oral daily  aMIOdarone    Tablet 200 milliGRAM(s) Oral daily  aspirin enteric coated 81 milliGRAM(s) Oral daily  atorvastatin 80 milliGRAM(s) Oral at bedtime  bisacodyl Suppository 10 milliGRAM(s) Rectal daily PRN  chlorhexidine 2% Cloths 1 Application(s) Topical daily  chlorhexidine 4% Liquid 1 Application(s) Topical <User Schedule>  heparin  Infusion 1200 Unit(s)/Hr IV Continuous <Continuous>  hydrALAZINE 100 milliGRAM(s) Oral every 8 hours  insulin lispro (ADMELOG) corrective regimen sliding scale   SubCutaneous at bedtime  insulin lispro (ADMELOG) corrective regimen sliding scale   SubCutaneous three times a day before meals  isosorbide   dinitrate Tablet (ISORDIL) 40 milliGRAM(s) Oral three times a day  lidocaine   4% Patch 1 Patch Transdermal daily  metoprolol succinate ER 25 milliGRAM(s) Oral daily  milrinone Infusion 0.5 MICROgram(s)/kG/Min IV Continuous <Continuous>  pantoprazole    Tablet 40 milliGRAM(s) Oral before breakfast  polyethylene glycol 3350 17 Gram(s) Oral daily  senna 1 Tablet(s) Oral at bedtime  simethicone 80 milliGRAM(s) Chew every 6 hours PRN      Physical Exam:    Vitals:  Vital Signs Last 24 Hours  T(C): 36.8 (06-10-22 @ 08:00), Max: 37.8 (06-10-22 @ 02:25)  HR: 105 (06-10-22 @ 10:00) (80 - 109)  BP: 92/53 (06-10-22 @ 10:00) (92/53 - 92/53)  RR: 25 (06-10-22 @ 10:00) (18 - 28)  SpO2: 96% (06-10-22 @ 10:00) (91% - 96%)    Weight in k.1 (06-10 @ 06:00)    I&O's Summary    2022 07:01  -  10 Gus 2022 07:00  --------------------------------------------------------  IN: 1465 mL / OUT: 1925 mL / NET: -460 mL    10 Gus 2022 07:01  -  10 Gus 2022 11:28  --------------------------------------------------------  IN: 310.2 mL / OUT: 700 mL / NET: -389.8 mL        Tele:    General: No distress. Comfortable.  HEENT: EOM intact.  Neck: Neck supple. JVP not elevated. No masses  Chest: Clear to auscultation bilaterally  CV: Normal S1 and S2. No murmurs, rub, or gallops. Radial pulses normal.  Abdomen: Soft, non-distended, non-tender  Skin: No rashes or skin breakdown  Neurology: Alert and oriented times three. Sensation intact  Psych: Affect normal    Labs:                        13.0   13.55 )-----------( 218      ( 10 Gus 2022 04:35 )             38.2     06-10    132<L>  |  100  |  21  ----------------------------<  166<H>  4.2   |  22  |  1.44<H>    Ca    9.0      10 Gus 2022 04:35  Phos  3.2     06-10  Mg     1.8     06-10    TPro  6.4  /  Alb  3.2<L>  /  TBili  0.6  /  DBili  x   /  AST  21  /  ALT  24  /  AlkPhos  64  06-10    PT/INR - ( 10 Gus 2022 04:35 )   PT: 13.6 sec;   INR: 1.17 ratio         PTT - ( 10 Gus 2022 04:35 )  PTT:55.9 sec        Oxygen Saturation, Mixed: 68.0 ( @ 14:35)  Oxygen Saturation, Mixed: 72.7 ( @ 06:20)  Oxygen Saturation, Mixed: 71.2 ( @ 22:10)      Lactate, Blood: 1.1 mmol/L (06-10 @ 04:34)     Subjective:  - NAEO  - Remains Afebrile  - States he feels better today and is in better spirits  - IABP 1:1 in place    Medications:  acetaminophen Tablet .. 650 milliGRAM(s) Oral every 6 hours PRN  allopurinol 100 milliGRAM(s) Oral daily  aMIOdarone  Tablet 200 milliGRAM(s) Oral daily  aspirin enteric coated 81 milliGRAM(s) Oral daily  atorvastatin 80 milliGRAM(s) Oral at bedtime  bisacodyl Suppository 10 milliGRAM(s) Rectal daily PRN  chlorhexidine 2% Cloths 1 Application(s) Topical daily  chlorhexidine 4% Liquid 1 Application(s) Topical <User Schedule>  heparin  Infusion 1200 Unit(s)/Hr IV Continuous <Continuous>  hydrALAZINE 100 milliGRAM(s) Oral every 8 hours  insulin lispro (ADMELOG) corrective regimen sliding scale   SubCutaneous at bedtime  insulin lispro (ADMELOG) corrective regimen sliding scale   SubCutaneous three times a day before meals  isosorbide   dinitrate Tablet (ISORDIL) 40 milliGRAM(s) Oral three times a day  lidocaine   4% Patch 1 Patch Transdermal daily  metoprolol succinate ER 25 milliGRAM(s) Oral daily  milrinone Infusion 0.5 MICROgram(s)/kG/Min IV Continuous <Continuous>  pantoprazole    Tablet 40 milliGRAM(s) Oral before breakfast  polyethylene glycol 3350 17 Gram(s) Oral daily  senna 1 Tablet(s) Oral at bedtime  simethicone 80 milliGRAM(s) Chew every 6 hours PRN      Physical Exam:    Vitals:  Vital Signs Last 24 Hours  T(C): 36.8 (06-10-22 @ 08:00), Max: 37.8 (06-10-22 @ 02:25)  HR: 105 (06-10-22 @ 10:00) (80 - 109)  BP: 92/53 (06-10-22 @ 10:00) (92/53 - 92/53)  RR: 25 (06-10-22 @ 10:00) (18 - 28)  SpO2: 96% (06-10-22 @ 10:00) (91% - 96%)    Weight in k.1 (06-10 @ 06:00)    I&O's Summary    2022 07:01  -  10 Gus 2022 07:00  --------------------------------------------------------  IN: 1465 mL / OUT: 1925 mL / NET: -460 mL    10 Gus 2022 07:01  -  10 Gus 2022 11:28  --------------------------------------------------------  IN: 310.2 mL / OUT: 700 mL / NET: -389.8 mL        Tele: SR 80's    General: No distress. Comfortable.  HEENT: EOM intact.  Neck: Neck supple. JVP not elevated. No masses  Chest: Clear to auscultation bilaterally  CV: Normal S1 and S2. No murmurs, rub, or gallops. Radial pulses normal. wamr peripherally  Abdomen: Soft, non-distended, non-tender  Skin: No rashes or skin breakdown,  Neurology: Alert and oriented times three. Sensation intact  Psych: Affect normal    Labs:                        13.0   13.55 )-----------( 218      ( 10 Gus 2022 04:35 )             38.2     06-10    132<L>  |  100  |  21  ----------------------------<  166<H>  4.2   |  22  |  1.44<H>    Ca    9.0      10 Gus 2022 04:35  Phos  3.2     06-10  Mg     1.8     06-10    TPro  6.4  /  Alb  3.2<L>  /  TBili  0.6  /  DBili  x   /  AST  21  /  ALT  24  /  AlkPhos  64  06-10    PT/INR - ( 10 Gus 2022 04:35 )   PT: 13.6 sec;   INR: 1.17 ratio         PTT - ( 10 Gus 2022 04:35 )  PTT:55.9 sec        Oxygen Saturation, Mixed: 68.0 ( @ 14:35)  Oxygen Saturation, Mixed: 72.7 ( @ 06:20)  Oxygen Saturation, Mixed: 71.2 ( @ 22:10)      Lactate, Blood: 1.1 mmol/L (06-10 @ 04:34)

## 2022-06-10 NOTE — PROGRESS NOTE ADULT - NS ATTEND AMEND GEN_ALL_CORE FT
65 y/o M with mixed ICM/NICM, admitted with cardiogenic shock and many episodes of VT. Underwent evaluation for advanced therapies. Found to have elevated pulmonary pressures but better on inotropes but he remained inadequately supported. IABP placed 6/6/2022 and was listed for heart transplant UNOS status 2E. Continue IABP and inotropes. Continue afterload reduction with Isordil/Hydralazine. SVR normalized with afterload reduction.   Fever overnight 6/7/22. RVP and UA negative. Blood cultures NGTD. [place] removed. Off antibiotics at this time. Was briefly UNOS status 7 after fevers but reactivated as status 2E today.

## 2022-06-10 NOTE — PROGRESS NOTE ADULT - CRITICAL CARE ATTENDING COMMENT
Presented to [facility] with VT, found to be in cardiogenic shock and transferred to [facility]  A+Ox3  Listed for heart transplant  Cardiogenic shock, on Milrinone and IABP - maintain until transplant  On max doses of Hydralazine/Isordil for afterload reduction - will maintain  Contine Toprol for VT  S/p stent in 4/22, continue ASA but holding Plavix   O2 sats mid 90s on room air  DASH/diabetic diet  Renal function worsened and is now an JAGRUTI, CVP low, autodiuresing - target even to 500 cc positive  H/H acceptable on Heparin drip for IABP  Febrile 2-3 days prior, s/p Ancef for UE cellulitis, pan cultured with no growth, [place] removed  Deferring antibiotics, ID is following  Sugars controlled  RIJ Cordis and IABP 6/6

## 2022-06-10 NOTE — PROGRESS NOTE ADULT - PROBLEM SELECTOR PLAN 5
- hx of VT s/p unsuccessful VT ablation  - continue on amio 200 mg PO QD  - since being admitted to [facility] has not had any episodes of VT, currently tolerating high dose milrinone.  -paced at 80 on telemetry with few PVCs

## 2022-06-10 NOTE — PROGRESS NOTE ADULT - SUBJECTIVE AND OBJECTIVE BOX
INFECTIOUS DISEASES FOLLOW UP-- Arabella Escalante  [phone]    This is a follow up note for this  66yMale with  Cardiomyopathy  pre-heart transplant listed      ROS:  CONSTITUTIONAL:  notes fatigue  CARDIOVASCULAR:  No chest pain or palpitations  RESPIRATORY:  No dyspnea  GASTROINTESTINAL:  No nausea, vomiting, diarrhea, or abdominal pain  GENITOURINARY:  No dysuria  NEUROLOGIC:  No headache,     Allergies    No Known Allergies    Intolerances        ANTIBIOTICS/RELEVANT:  antimicrobials    immunologic:    OTHER:  acetaminophen     Tablet .. 650 milliGRAM(s) Oral every 6 hours PRN  allopurinol 100 milliGRAM(s) Oral daily  aMIOdarone    Tablet 200 milliGRAM(s) Oral daily  aspirin enteric coated 81 milliGRAM(s) Oral daily  atorvastatin 80 milliGRAM(s) Oral at bedtime  bisacodyl Suppository 10 milliGRAM(s) Rectal daily PRN  chlorhexidine 2% Cloths 1 Application(s) Topical daily  chlorhexidine 4% Liquid 1 Application(s) Topical <User Schedule>  heparin  Infusion 1200 Unit(s)/Hr IV Continuous <Continuous>  hydrALAZINE 100 milliGRAM(s) Oral every 8 hours  insulin lispro (ADMELOG) corrective regimen sliding scale   SubCutaneous at bedtime  insulin lispro (ADMELOG) corrective regimen sliding scale   SubCutaneous three times a day before meals  isosorbide   dinitrate Tablet (ISORDIL) 40 milliGRAM(s) Oral three times a day  lidocaine   4% Patch 1 Patch Transdermal daily  metoprolol succinate ER 25 milliGRAM(s) Oral daily  milrinone Infusion 0.5 MICROgram(s)/kG/Min IV Continuous <Continuous>  pantoprazole    Tablet 40 milliGRAM(s) Oral before breakfast  polyethylene glycol 3350 17 Gram(s) Oral daily  senna 1 Tablet(s) Oral at bedtime  simethicone 80 milliGRAM(s) Chew every 6 hours PRN      Objective:  Vital Signs Last 24 Hrs  T(C): 37.3 (10 Gus 2022 17:30), Max: 37.8 (10 Gus 2022 02:25)  T(F): 99.1 (10 Gus 2022 17:30), Max: 100 (10 Gus 2022 02:25)  HR: 83 (10 Gus 2022 18:00) (80 - 109)  BP: 92/53 (10 Gus 2022 10:00) (92/53 - 92/53)  BP(mean): 66 (10 Gus 2022 10:00) (66 - 66)  RR: 18 (10 Gus 2022 18:00) (17 - 32)  SpO2: 93% (10 Gus 2022 18:00) (91% - 96%)    PHYSICAL EXAM:  Constitutional:no acute distress  Eyes:JOHNY, EOMI  Ear/Nose/Throat: no oral lesions, CVC lines removed	  Respiratory: clear BL  Cardiovascular: S1S2  Gastrointestinal:soft, (+) BS, no tenderness  Extremities:no e/e/c  No Lymphadenopathy  IV sites not inflammed. only PIVs    LABS:                        13.0   13.55 )-----------( 218      ( 10 Gus 2022 04:35 )             38.2     06-10    132<L>  |  100  |  19  ----------------------------<  132<H>  4.4   |  20<L>  |  1.22    Ca    8.8      10 Gus 2022 14:42  Phos  3.3     06-10  Mg     2.0     06-10    TPro  6.3  /  Alb  2.9<L>  /  TBili  0.6  /  DBili  x   /  AST  28  /  ALT  27  /  AlkPhos  67  06-10    PT/INR - ( 10 Gus 2022 04:35 )   PT: 13.6 sec;   INR: 1.17 ratio         PTT - ( 10 Gus 2022 04:35 )  PTT:55.9 sec      MICROBIOLOGY:      Urinalysis + Microscopic Examination (06.08.22 @ 00:26)    Urine Appearance: Clear    Urobilinogen: Negative    Specific Gravity: 1.014    Protein, Urine: Trace    pH Urine: 6.0    Leukocyte Esterase Concentration: Negative    Nitrite: Negative    Ketone - Urine: Negative    Bilirubin: Negative    Glucose Qualitative, Urine: Negative    Blood, Urine: Trace    Color: Light Yellow    Red Blood Cell - Urine: 1 /hpf    White Blood Cell - Urine: 0 /HPF    Epithelial Cells: 0 /hpf    Hyaline Casts: 0 /lpf    Bacteria: Negative    Respiratory Viral Panel with COVID-19 by LEANDER (06.07.22 @ 23:00)    Rapid RVP Result: NotDetec    SARS-CoV-2: NotDetec: This Respiratory Panel uses polymerase chain reaction (PCR) to detect for  adenovirus; coronavirus (HKU1, NL63, 229E, OC43); human metapneumovirus  (hMPV); human enterovirus/rhinovirus (Entero/RV); influenza A; influenza  A/H1; influenza A/H3; influenza A/H1-2009; influenza B; parainfluenza  viruses 1, 2, 3, 4; respiratory syncytial virus; Mycoplasma pneumoniae;  Chlamydophila pneumoniae; and SARS-CoV-2.          RECENT CULTURES:  06-07 @ 22:47  .Blood Blood  --  --  --    No growth to date.  --      RADIOLOGY & ADDITIONAL STUDIES:

## 2022-06-10 NOTE — PROGRESS NOTE ADULT - PROBLEM SELECTOR PLAN 7
- 100.6 fever on 5/28, cultures NGTD  - Tmax 100.4 on 6/7   - appreciate transplant ID recs, fever  possibly due to RUE occlusive thrombus seen in right cephalic and basilic veins as seen on [facility] duplex performed on 6/3, possible thrombophlebitis, was given Ancef 6/2-6/7   - leukocytosis remains elevated, however nor worsening today ~13  - RUE doppler 6/3 with no evidence of DVT, but did have an occlusive thrombus within the superficial veins (cephalic and basilic)  - BCx NGTD (6/7), RVP negative, UA negative  - Discussed with transplant ID and since bld cx have been negative x48 hours ok to re-list;

## 2022-06-10 NOTE — PROGRESS NOTE ADULT - SUBJECTIVE AND OBJECTIVE BOX
GOCOOL, LENNOX  MRN-[id number]  Patient is a 66y old  Male who presents with a chief complaint of LVAD/OHT eval (10 Gus 2022 19:32)    HPI:  66M Mixed Ischemic/NICM Cardiomyopathy (EF 25-30% at baseline, s/p BIV-ICD), CAD (medically managed MIs in 2008,2011, Most recent stent in April 2022 to mLAD) presented with multiple near syncope, found to have 41 episodes of VT and admitted initially to cardiac telemetry for further evaluation/management. Ischemic evaluation without new disease and VT ablation without substrate to complete ablation.   Transferred from [facility] for further management and evaluation for LVAD vs OHT.    (26 May 2022 20:31)      Hospital Course:  5/26 Transferred to CCU for further management     24 HOUR EVENTS:    REVIEW OF SYSTEMS:  CONSTITUTIONAL: No weakness, fevers or chills  EYES/ENT: No visual changes;  No vertigo or throat pain   NECK: No pain or stiffness  RESPIRATORY: No cough, wheezing, hemoptysis; No shortness of breath  CARDIOVASCULAR: No chest pain or palpitations  GASTROINTESTINAL: No abdominal or epigastric pain. No nausea, vomiting, or hematemesis; No diarrhea or constipation. No melena or hematochezia.  GENITOURINARY: No dysuria, frequency or hematuria  NEUROLOGICAL: No numbness or weakness  SKIN: No itching, rashes      ICU Vital Signs Last 24 Hrs  T(C): 37.9 (10 Gus 2022 19:00), Max: 37.9 (10 Gus 2022 19:00)  T(F): 100.3 (10 Gus 2022 19:00), Max: 100.3 (10 Gus 2022 19:00)  HR: 80 (10 Gus 2022 21:00) (80 - 109)  BP: 92/53 (10 Gus 2022 10:00) (92/53 - 92/53)  BP(mean): 66 (10 Gus 2022 10:00) (66 - 66)  ABP: --  ABP(mean): --  RR: 22 (10 Gus 2022 21:00) (17 - 32)  SpO2: 93% (10 Gus 2022 21:00) (91% - 96%)      CVP(mm Hg): --  CO: --  CI: --  PA: --  PA(mean): --  PA(direct): --  PCWP: --  LA: --  RA: --  SVR: --  SVRI: --  PVR: --  PVRI: --  I&O's Summary    09 Jun 2022 07:01  -  10 Gus 2022 07:00  --------------------------------------------------------  IN: 1465 mL / OUT: 1925 mL / NET: -460 mL    10 Gus 2022 07:01  -  10 Gus 2022 21:42  --------------------------------------------------------  IN: 1500.8 mL / OUT: 1200 mL / NET: 300.8 mL        CAPILLARY BLOOD GLUCOSE    CAPILLARY BLOOD GLUCOSE      POCT Blood Glucose.: 134 mg/dL (10 Gus 2022 21:17)      PHYSICAL EXAM:  GENERAL: No acute distress, well-developed  HEAD:  Atraumatic, Normocephalic  EYES: EOMI, PERRLA, conjunctiva and sclera clear  NECK: Supple, no lymphadenopathy, no JVD  CHEST/LUNG: CTAB; No wheezes, rales, or rhonchi  HEART: Regular rate and rhythm. Normal S1/S2. No murmurs, rubs, or gallops  ABDOMEN: Soft, non-tender, non-distended; normal bowel sounds, no organomegaly  EXTREMITIES:  2+ peripheral pulses b/l, No clubbing, cyanosis, or edema  NEUROLOGY: A&O x 3, no focal deficits  SKIN: No rashes or lesions      ============================I/O===========================   I&O's Detail    09 Jun 2022 07:01  -  10 Gus 2022 07:00  --------------------------------------------------------  IN:    Heparin: 312.5 mL    IV PiggyBack: 100 mL    Milrinone: 272.5 mL    Oral Fluid: 780 mL  Total IN: 1465 mL    OUT:    Voided (mL): 1925 mL  Total OUT: 1925 mL    Total NET: -460 mL      10 Gus 2022 07:01  -  10 Gus 2022 21:42  --------------------------------------------------------  IN:    Heparin: 150 mL    Lactated Ringers Bolus: 500 mL    Milrinone: 130.8 mL    Oral Fluid: 720 mL  Total IN: 1500.8 mL    OUT:    Voided (mL): 1200 mL  Total OUT: 1200 mL    Total NET: 300.8 mL        ============================ LABS =========================                        13.0   13.55 )-----------( 218      ( 10 Gus 2022 04:35 )             38.2     06-10    132<L>  |  100  |  19  ----------------------------<  132<H>  4.4   |  20<L>  |  1.22    Ca    8.8      10 Gus 2022 14:42  Phos  3.3     06-10  Mg     2.0     06-10    TPro  6.3  /  Alb  2.9<L>  /  TBili  0.6  /  DBili  x   /  AST  28  /  ALT  27  /  AlkPhos  67  06-10                LIVER FUNCTIONS - ( 10 Gus 2022 14:42 )  Alb: 2.9 g/dL / Pro: 6.3 g/dL / ALK PHOS: 67 U/L / ALT: 27 U/L / AST: 28 U/L / GGT: x           PT/INR - ( 10 Gus 2022 04:35 )   PT: 13.6 sec;   INR: 1.17 ratio         PTT - ( 10 Gus 2022 04:35 )  PTT:55.9 sec    Blood Gas Venous - Lactate: 1.0 mmol/L (06-10-22 @ 14:13)  Lactate, Blood: 1.1 mmol/L (06-10-22 @ 04:34)  Blood Gas Venous - Lactate: 1.3 mmol/L (06-09-22 @ 05:41)  Blood Gas Venous - Lactate: 1.1 mmol/L (06-08-22 @ 14:35)      ======================Micro/Rad/Cardio=================  Telemtry: Reviewed   EKG: Reviewed  CXR: Reviewed  Culture: Reviewed   Echo:   Cath:   ======================================================  PAST MEDICAL & SURGICAL HISTORY:  AV block      Essential hypertension      Chronic HFrEF (heart failure with reduced ejection fraction)      Chronic kidney disease, unspecified CKD stage      CAD (coronary artery disease)      HLD (hyperlipidemia)      Type 2 diabetes mellitus      Artificial cardiac pacemaker        ====================ASSESSMENT ==============  67 y/o male w/ PMHx HTN, HLD, type 2 DM, chronic HFrEF (EF 25-30% by Echo), complete heart block s/p PPM (in 2006, upgraded to BiV-ICD in 09/2016), CAD (s/p recent BERNABE mid LAD at [facility] on 04/18/2022), and stage II CKD (baseline Cr ~1.3) presented with near syncope to OSH found to have 41 episodes of VT on device, s/p unsuccessful VT ablation and transferred to [facility] for management of cardiogenic shock and advanced therapy eval.         Plan:  ====================== NEUROLOGY=====================   AAOx3  - No active issues  - Tylenol PRN for fevers/pain    acetaminophen     Tablet .. 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 RV enlargement with decreased RV systolic function.  - [facility] 5/23: patent mLAD stent with slow flow, D1 with 40-50% stenosis  - RHC 5/26: RA 12, PA 70/40 (50), PCWP 22, Milana CO/CI 2.3/1.3, MAP 83 with SVR 2469, PVR 12 PERSAUD  - 5/27: RA 10, PA 76/35 (49), PCWP 34, PA sat 57% with Milana CO/CI 3.1/1.6, MAP 71 with SVR 1574, PVR 4.8  - 6/6 RHC: CVP 17, RV 75/4, PA 80/36, PCW 22, CI 1.88. IABP placed and pt transferred to CICU.   - Maintain IABP 1:1, monitor PTT , therapeutic to 55.9 today  - Maintain Milrinone .5mcg/kg/min   - Cont hydral 100 TID and ISDN 40TID for AL Reduction, Assisted mean goal 70-80   - monitor hemodynamics and perfusion indices   - HF following, pending OHT Listing     VTach   - s/p EPS on 5/24, unable to perform ablation as no substrate found and did not induce VT because of severely low EF   - Interrogation of ICD @Dr Wilson's office 5/20: back-to-back episodes of VT @ 200+ bpm all terminating with ATP. Since last cath pt has had 41 VT episodes (all falling into VF zone)  - Normal device function. BIV pacing 97%. No programming changes made  - c/w Amiodarone  - c/w Toprol 25 daily   - no further VT on inotropes     CAD   - Chest pain free and compliant with DAPT since last PCI 4/18/22/ per discussion with interventional at OSH, ok to DC plavix, last dose 5/28  - Cardiac cath @[facility] 4/18/22: mLAD 90% s/p BERNABE, LCx mild disease, RCA mild disease s/p diagnostic cardiac cath w/ Dr Wagner on 05/23/2022   - Cont ASA and Lipitor     aspirin enteric coated 81 milliGRAM(s) Oral daily  atorvastatin 80 milliGRAM(s) Oral at bedtime  aMIOdarone    Tablet 200 milliGRAM(s) Oral daily  hydrALAZINE 100 milliGRAM(s) Oral every 8 hours  isosorbide   dinitrate Tablet (ISORDIL) 40 milliGRAM(s) Oral three times a day  metoprolol succinate ER 25 milliGRAM(s) Oral daily  milrinone Infusion 0.5 MICROgram(s)/kG/Min (11 mL/Hr) IV Continuous <Continuous>      ===================HEMATOLOGIC/ONC ===================  Secondary Polycythemia Vera   - elevated EPO, hgb in normal range  - Heme following peripherally  - f/u MELINA-2     AC therapy   - Heparin gtt for IABP, low ptt goal     heparin  Infusion 1200 Unit(s)/Hr (12.5 mL/Hr) IV Continuous <Continuous>    ===================== RENAL =========================  JAGRUTI on CKD II  Admitted w/ Cr 2.01 (baseline Cr ~1.3) was downtrending with inotropic support, now rising 1.12->1.29   - likely i/s/o cardiogenic shock given RHC findings and CI of 1.88  - Avoid nephrotoxic agents, NSAIDs. Renally dose meds.  - monitor strict IOs and continue current cardiac support       ==================== GASTROINTESTINAL===================  Consistent Carb Diet:  - Tolerating PO diet    Constipation:  - c/w bowel regimen Miralax, Senna, doculax suppository PRN last BM 6/9  - s/p colonoscopy 6/3 with 5 polyps removed and biopsied, benign findings- GI recs for 3 yr follow up colo   - Simethicone PRN gas     bisacodyl Suppository 10 milliGRAM(s) Rectal daily PRN Constipation  pantoprazole    Tablet 40 milliGRAM(s) Oral before breakfast  polyethylene glycol 3350 17 Gram(s) Oral daily  senna 1 Tablet(s) Oral at bedtime  simethicone 80 milliGRAM(s) Chew every 6 hours PRN Gas    =======================    ENDOCRINE  =====================  T2DM (A1C 6.2 on admission)  - Cont. ISS, glucose controlled, monitor FS closely     Gout flare, Left knee (previously R)  - continue allopurinol, s/p prednisone     allopurinol 100 milliGRAM(s) Oral daily  insulin lispro (ADMELOG) corrective regimen sliding scale   SubCutaneous at bedtime  insulin lispro (ADMELOG) corrective regimen sliding scale   SubCutaneous three times a day before meals    ========================INFECTIOUS DISEASE================  RUE Thrombophlebitis  - RUE US Duplex showing superficial thrombus  - s/p ancef      Febrile 6/7-8 overnight x1   - UA neg, BCx on 6/7 NGTD x2, RVP neg   - follow fever curve  - d/c'd swan          Patient requires continuous monitoring with bedside rhythm monitoring, pulse ox monitoring, and intermittent blood gas analysis. Care plan discussed with ICU care team. Patient remained critical and at risk for life threatening decompensation.  Patient seen, examined and plan discussed with CCU team during rounds.     I have personally provided ____ minutes of critical care time excluding time spent on separate procedures, in addition to initial critical care time provided by the CICU Attending, Dr. Leonardo.     By signing my name below, I, Camila Martinez, attest that this documentation has been prepared under the direction and in the presence of Declan Crump NP  Electronically signed: Mini Lassiter, 06-10-22 @ 21:42    I, Declan Crump NP, personally performed the services described in this documentation. all medical record entries made by the nelidaibe were at my direction and in my presence. I have reviewed the chart and agree that the record reflects my personal performance and is accurate and complete  Electronically signed: Declan Crump NP        GOCOOL, LENNOX  MRN-[id number]  Patient is a 66y old  Male who presents with a chief complaint of LVAD/OHT eval (10 Gus 2022 19:32)    HPI: 66M Mixed Ischemic/NICM Cardiomyopathy (EF 25-30% at baseline, s/p BIV-ICD), CAD (medically managed MIs in 2008,2011, Most recent stent in April 2022 to mLAD) presented with multiple near syncope, found to have 41 episodes of VT and admitted initially to cardiac telemetry for further evaluation/management. Ischemic evaluation without new disease and VT ablation without substrate to complete ablation.  Transferred from [facility] for further management and evaluation for LVAD vs OHT. (26 May 2022 20:31)    Hospital Course:  5/26 Transferred to CCU for further management     24 HOUR EVENTS:  REVIEW OF SYSTEMS:  CONSTITUTIONAL: No weakness, fevers or chills  EYES/ENT: No visual changes;  No vertigo or throat pain   NECK: No pain or stiffness  RESPIRATORY: No cough, wheezing, hemoptysis; No shortness of breath  CARDIOVASCULAR: No chest pain or palpitations  GASTROINTESTINAL: No abdominal or epigastric pain. No nausea, vomiting, or hematemesis; No diarrhea or constipation. No melena or hematochezia.  GENITOURINARY: No dysuria, frequency or hematuria  NEUROLOGICAL: No numbness or weakness  SKIN: No itching, rashes    ICU Vital Signs Last 24 Hrs  T(C): 37.9 (10 Gus 2022 19:00), Max: 37.9 (10 Gus 2022 19:00)  T(F): 100.3 (10 Gus 2022 19:00), Max: 100.3 (10 Gus 2022 19:00)  HR: 80 (10 Gus 2022 21:00) (80 - 109)  BP: 92/53 (10 Gus 2022 10:00) (92/53 - 92/53)  BP(mean): 66 (10 Gus 2022 10:00) (66 - 66)  RR: 22 (10 Gus 2022 21:00) (17 - 32)  SpO2: 93% (10 Gus 2022 21:00) (91% - 96%)    I&O's Summary    09 Jun 2022 07:01  -  10 Gus 2022 07:00  --------------------------------------------------------  IN: 1465 mL / OUT: 1925 mL / NET: -460 mL    10 Gus 2022 07:01  -  10 Gus 2022 21:42  --------------------------------------------------------  IN: 1500.8 mL / OUT: 1200 mL / NET: 300.8 mL      CAPILLARY BLOOD GLUCOSE  POCT Blood Glucose.: 134 mg/dL (10 Gus 2022 21:17)  ============================I/O===========================   I&O's Detail    09 Jun 2022 07:01  -  10 Gus 2022 07:00  --------------------------------------------------------  IN:    Heparin: 312.5 mL    IV PiggyBack: 100 mL    Milrinone: 272.5 mL    Oral Fluid: 780 mL  Total IN: 1465 mL    OUT:    Voided (mL): 1925 mL  Total OUT: 1925 mL    Total NET: -460 mL      10 Gus 2022 07:01  -  10 Gus 2022 21:42  --------------------------------------------------------  IN:    Heparin: 150 mL    Lactated Ringers Bolus: 500 mL    Milrinone: 130.8 mL    Oral Fluid: 720 mL  Total IN: 1500.8 mL    OUT:    Voided (mL): 1200 mL  Total OUT: 1200 mL    Total NET: 300.8 mL  ============================ LABS =========================                        13.0   13.55 )-----------( 218      ( 10 Gus 2022 04:35 )             38.2     06-10    132<L>  |  100  |  19  ----------------------------<  132<H>  4.4   |  20<L>  |  1.22    Ca    8.8      10 Gus 2022 14:42  Phos  3.3     06-10  Mg     2.0     06-10    TPro  6.3  /  Alb  2.9<L>  /  TBili  0.6  /  DBili  x   /  AST  28  /  ALT  27  /  AlkPhos  67  06-10    LIVER FUNCTIONS - ( 10 Gus 2022 14:42 )  Alb: 2.9 g/dL / Pro: 6.3 g/dL / ALK PHOS: 67 U/L / ALT: 27 U/L / AST: 28 U/L / GGT: x           PT/INR - ( 10 Gus 2022 04:35 )   PT: 13.6 sec;   INR: 1.17 ratio    PTT - ( 10 Gus 2022 04:35 )  PTT:55.9 sec    Blood Gas Venous - Lactate: 1.0 mmol/L (06-10-22 @ 14:13)  Lactate, Blood: 1.1 mmol/L (06-10-22 @ 04:34)  Blood Gas Venous - Lactate: 1.3 mmol/L (06-09-22 @ 05:41)  Blood Gas Venous - Lactate: 1.1 mmol/L (06-08-22 @ 14:35)  ======================Micro/Rad/Cardio=================  Telemtry: Reviewed   EKG: Reviewed  CXR: Reviewed  Culture: Reviewed   Echo:   Cath:   ======================================================  PAST MEDICAL & SURGICAL HISTORY:  AV block    Essential hypertension    Chronic HFrEF (heart failure with reduced ejection fraction)    Chronic kidney disease, unspecified CKD stag    CAD (coronary artery disease)    HLD (hyperlipidemia)    Type 2 diabetes mellitus    Artificial cardiac pacemaker  ====================ASSESSMENT ==============  65 y/o male w/ PMHx HTN, HLD, type 2 DM, chronic HFrEF (EF 25-30% by Echo), complete heart block s/p PPM (in 2006, upgraded to BiV-ICD in 09/2016), CAD (s/p recent BERNABE mid LAD at [facility] on 04/18/2022), and stage II CKD (baseline Cr ~1.3) presented with near syncope to OSH found to have 41 episodes of VT on device, s/p unsuccessful VT ablation and transferred to [facility] for management of cardiogenic shock and advanced therapy eval.     Plan:  ====================== NEUROLOGY=====================   AAOx3  - No active issues  - Tylenol PRN for fevers/pain    acetaminophen     Tablet .. 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 RV enlargement with decreased RV systolic function.  - [facility] 5/23: patent mLAD stent with slow flow, D1 with 40-50% stenosis  - [facility] 5/26: RA 12, PA 70/40 (50), PCWP 22, Milana CO/CI 2.3/1.3, MAP 83 with SVR 2469, PVR 12 PERSAUD  - 5/27: RA 10, PA 76/35 (49), PCWP 34, PA sat 57% with Milana CO/CI 3.1/1.6, MAP 71 with SVR 1574, PVR 4.8  - 6/6 RHC: CVP 17, RV 75/4, PA 80/36, PCW 22, CI 1.88. IABP placed and pt transferred to CICU.   - Maintain IABP 1:1, monitor PTT , therapeutic to 55.9 today  - Maintain Milrinone .5mcg/kg/min   - Cont hydral 100 TID and ISDN 40TID for AL Reduction, Assisted mean goal 70-80   - monitor hemodynamics and perfusion indices   - HF following, pending OHT Listing     VTach   - s/p EPS on 5/24, unable to perform ablation as no substrate found and did not induce VT because of severely low EF   - Interrogation of ICD @Dr Wilson's office 5/20: back-to-back episodes of VT @ 200+ bpm all terminating with ATP. Since last cath pt has had 41 VT episodes (all falling into VF zone)  - Normal device function. BIV pacing 97%. No programming changes made  - c/w Amiodarone  - c/w Toprol 25 daily   - no further VT on inotropes     CAD   - Chest pain free and compliant with DAPT since last PCI 4/18/22/ per discussion with interventional at OSH, ok to DC plavix, last dose 5/28  - Cardiac cath @[facility] 4/18/22: mLAD 90% s/p BERNABE, LCx mild disease, RCA mild disease s/p diagnostic cardiac cath w/ Dr Wagner on 05/23/2022   - Cont ASA and Lipitor     aspirin enteric coated 81 milliGRAM(s) Oral daily  atorvastatin 80 milliGRAM(s) Oral at bedtime  aMIOdarone    Tablet 200 milliGRAM(s) Oral daily  hydrALAZINE 100 milliGRAM(s) Oral every 8 hours  isosorbide   dinitrate Tablet (ISORDIL) 40 milliGRAM(s) Oral three times a day  metoprolol succinate ER 25 milliGRAM(s) Oral daily  milrinone Infusion 0.5 MICROgram(s)/kG/Min (11 mL/Hr) IV Continuous <Continuous>    ===================HEMATOLOGIC/ONC ===================  Secondary Polycythemia Vera   - elevated EPO, hgb in normal range  - Heme following peripherally  - f/u MELINA-2     AC therapy   - Heparin gtt for IABP, low ptt goal     heparin  Infusion 1200 Unit(s)/Hr (12.5 mL/Hr) IV Continuous <Continuous>    ===================== RENAL =========================  JAGRUTI on CKD II  Admitted w/ Cr 2.01 (baseline Cr ~1.3) was downtrending with inotropic support, now rising 1.12->1.29   - likely i/s/o cardiogenic shock given RHC findings and CI of 1.88  - Avoid nephrotoxic agents, NSAIDs. Renally dose meds.  - monitor strict IOs and continue current cardiac support       ==================== GASTROINTESTINAL===================  Consistent Carb Diet:  - Tolerating PO diet    Constipation:  - c/w bowel regimen Miralax, Senna, doculax suppository PRN last BM 6/9  - s/p colonoscopy 6/3 with 5 polyps removed and biopsied, benign findings- GI recs for 3 yr follow up colo   - Simethicone PRN gas     bisacodyl Suppository 10 milliGRAM(s) Rectal daily PRN Constipation  pantoprazole    Tablet 40 milliGRAM(s) Oral before breakfast  polyethylene glycol 3350 17 Gram(s) Oral daily  senna 1 Tablet(s) Oral at bedtime  simethicone 80 milliGRAM(s) Chew every 6 hours PRN Gas    =======================    ENDOCRINE  =====================  T2DM (A1C 6.2 on admission)  - Cont. ISS, glucose controlled, monitor FS closely     Gout flare, Left knee (previously R)  - continue allopurinol, s/p prednisone     allopurinol 100 milliGRAM(s) Oral daily  insulin lispro (ADMELOG) corrective regimen sliding scale   SubCutaneous at bedtime  insulin lispro (ADMELOG) corrective regimen sliding scale   SubCutaneous three times a day before meals    ========================INFECTIOUS DISEASE================  RUE Thrombophlebitis  - RUE US Duplex showing superficial thrombus  - s/p ancef      Febrile 6/7-8 overnight x1   - UA neg, BCx on 6/7 NGTD x2, RVP neg   - follow fever curve  - d/c'd swan      Patient requires continuous monitoring with bedside rhythm monitoring, pulse ox monitoring, and intermittent blood gas analysis. Care plan discussed with ICU care team. Patient remained critical and at risk for life threatening decompensation.  Patient seen, examined and plan discussed with CCU team during rounds.     I have personally provided 30 minutes of critical care time excluding time spent on separate procedures, in addition to initial critical care time provided by the CICU Attending, Dr. Leonardo.     By signing my name below, I, Camila Martinez, attest that this documentation has been prepared under the direction and in the presence of Declan Crump NP  Electronically signed: Licha Lassiter, 06-10-22 @ 21:42    I, Declan Crump NP, personally performed the services described in this documentation. all medical record entries made by the licha were at my direction and in my presence. I have reviewed the chart and agree that the record reflects my personal performance and is accurate and complete  Electronically signed: Declan Crump NP

## 2022-06-10 NOTE — PROGRESS NOTE ADULT - PROBLEM SELECTOR PLAN 6
6/8 SCr 1.12, 6/9 1.29   - upon arrival was noted to have JAGRUTI, likely related to low cardiac output and is now improving/stable

## 2022-06-10 NOTE — PROGRESS NOTE ADULT - PROBLEM SELECTOR PLAN 1
- continue IABP 1:1  - continue milrinone 0.5 mcg/kg/min  - continue HDZN 100 mg TID and ISDN 20 mg TID for afterload reduction, hold for assisted mean < 70   -swan pulled on 6/8/22;

## 2022-06-10 NOTE — PROGRESS NOTE ADULT - ASSESSMENT
65 y/o Finnish M PMHx mixed Ischemic/NICM cardiomyopathy (EF 25-30% at baseline, s/p BIV-ICD) and CAD, initially presented to [facility] with near syncope, found to intermittent episodes of VT and admitted to cardiac telemetry for further evaluation/management. S/p unsuccessful VT ablation. Now transferred to [facility] CCU for advanced therapies evaluation. S/p [place] placement in CCU, course c/b fever on 5/28, s/p subsequent [place] removal. Blood cultures no growth, no further fevers.     Last fever 100.6 (5/28)  Blood Cultures (5/28) no growth  Urinalysis unremarkable  CT Chest (5/28) mild pulmonary edema    Immunizations:  COVID Pfizer x2 (10/2021)    Pre-Transplant Labs:  HAV IgG+  HBsAb-, HBsAg-, HBcAb-   HCV Ab-  HSV 1/2 IgG +/-  EBV negative  CMV IgG+  Toxoplasma IgG-  VZV IgG+  Measles IgG+  Mumps IgG+   Rubella IgG-  Quantiferon Gold negative  HIV Test negative  Syphilis Screen negative  Strongyloides Ab negative    Impression:  pre- heart transplant listing    Recs:  -mild leukocytosis and low grade fever  - non toxic appearing- notes a little fatigue after receiving an influenza vaccine  CXR=NAD, no dysuria or cough,   phlebitis RUE without tenderness at this point and improved  - Braydon introducer removed      Health maintenance:  - s/p first dose of hep B vaccine on 6/3/22  - s/p Prevnar 20 vaccine dose   --s/p influenza vaccine       Reggie Escalante MD  Can be called via Teams  After 5pm/weekends [phone]

## 2022-06-10 NOTE — PROGRESS NOTE ADULT - PROBLEM SELECTOR PLAN 2
- continue Toprol XL 25 mg PO QD   - Re-listed UNOS status 2e for OHT, awaiting suitable donor (ABO A, PRA 0%)

## 2022-06-10 NOTE — PROGRESS NOTE ADULT - ASSESSMENT
65 YO M with a history of ACC/AHA Stage D mixed NICM/ICM (likely familial with strong FH and early arrhythmia history in his 30's) with LVED 5.2 cm and LVEF 10-15% s/p PPM upgraded to CRT-D, CAD s/p PCI to mLAD 4/2022, well controlled DM2 (A1c 6.2%), and CKD III (Cr 1.4) who initially presented to [facility] 5/20 with near syncope in setting of worsening HF symptoms and found to have 41 episodes of VT, many terminating with ATP. LHC did not reveal new obstructive CAD and he underwent EPS which did not reveal endocardial substrate amenable for ablation. RHC revealed severely depressed cardiac output and he was transferred to [facility] 5/26 for advanced therapies evaluation.    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 He improved significantly with sequential uptitration of inotropes and increased pacing rate, but on 6/6 he had worsening JAGRUTI. He is s/p RHC which revealed severely elevated filling pressures, severe cpcPH, and CI of 1.9 on milrinone 0.5. IABP was placed and his renal function/PAPs have improved. He is MCS dependent and was inadequately supported with high dose inotropes, so was listed UNOS status 2e for OHT, awaiting suitable donor. However, was made status 7 as he became febrile a few days ago. He has been afebrile since and blood cultures from 6/7 with NGTD x 48 hours and his leukocytosis has not worsened. Discussed with transplant ID and since bld cx negative x48hrs will move forward with re-listing today.     Review of studies  TTE 5/21: LV 5.2 cm, LVEF 10-15%, LVOT VTI 10 cm, moderate RV dysfunction, mild AI, minimal MR  EKG: a-BiV paced  C 5/23: patent mLAD stent with slow flow, D1 with 40-50% stenosis, mild disease otherwise  RHC 5/26: RA 12, PA 70/40 (50), PCWP 22, Milana CO/CI 2.3/1.3, MAP 83 with SVR 2469, PVR 12 PERSAUD    Hemodynamics  5/27 (milrinone 0.25): RA 10, PA 76/35 (49), PCWP 34, PA sat 57% with Milana CO/CI 3.1/1.6, MAP 71 with SVR 1574, PVR 4.8  5/28 (on milrinone 0.375): RA 7, PA 63/31, PCWP 26, PA sat 72.8% with Milana CO/CI 4.9/2.5 (CI later dropped to 1.6 in the afternoon), MAP 70 with SVR 1039, PVR 2.9  5/29 (on milrinone 0.5): RA 8, PA 75/32 (50), PCWP 28, PA sat 74% with Milana CO/CI 5.0/2.6, MAP 77 with SVR 1200, PVR 4.4  5/29 (on milrinone 0.5 and nipride to 3 mcg/kg/min): RA 6, PA 59/31 (40), PCWP 30, PA sat 79% with Milana CO/CI 7.0/3.6, BP 97/57 (MAP 70) with , PVR 1.4  6/6 RHC (mil 0.5): RA 11 (v13), RV 75/17, PA 80/36/52, PCWP 24 (v29), PA sat 59.5%, CO/CI (F) 3.58/1.88  6/7 (mil 0.5, IABP 1:1): CVP 5, PA 66/32/45, PA sat 65.7%, CO/CI (F) 4.0/2.1, SVR 2000  6/8 (mil 0.5, IABP 1:1): CVP 1, PA 46/19/28, PA sat 72.7%, CO/CI (F) 5.6/2.9, SVR 1257  6/8 PM (mil 0.5, IABP 1:1): CVP 4, PA 68/25/38, PA sat 68%, CO/CI (f) 5/2.6, SVR 1326

## 2022-06-10 NOTE — PROGRESS NOTE ADULT - SUBJECTIVE AND OBJECTIVE BOX
GOCOOL, LENNOX  MRN-[id number]  Patient is a 66y old  Male who presents with a chief complaint of LVAD/OHT eval (09 Jun 2022 22:59)    HPI:  66M Mixed Ischemic/NICM Cardiomyopathy (EF 25-30% at baseline, s/p BIV-ICD), CAD (medically managed MIs in 2008,2011, Most recent stent in April 2022 to mLAD) presented with multiple near syncope, found to have 41 episodes of VT and admitted initially to cardiac telemetry for further evaluation/management. Ischemic evaluation without new disease and VT ablation without substrate to complete ablation.   Transferred from [facility] for further management and evaluation for LVAD vs OHT.    (26 May 2022 20:31)      Overnight Events:    Subjective:    REVIEW OF SYSTEMS:    CONSTITUTIONAL: No weakness, fevers or chills  EYES/ENT: No visual changes;  No vertigo or throat pain   NECK: No pain or stiffness  RESPIRATORY: No cough, wheezing, hemoptysis; No shortness of breath  CARDIOVASCULAR: No chest pain or palpitations  GASTROINTESTINAL: No abdominal or epigastric pain. No nausea, vomiting, or hematemesis; No diarrhea or constipation. No melena or hematochezia.  GENITOURINARY: No dysuria, frequency or hematuria  NEUROLOGICAL: No numbness or weakness  SKIN: No itching, rashes      ICU Vital Signs Last 24 Hrs  T(C): 37.1 (10 Gus 2022 05:00), Max: 37.8 (10 Gus 2022 02:25)  T(F): 98.8 (10 Gus 2022 05:00), Max: 100 (10 Gus 2022 02:25)  HR: 80 (10 Gus 2022 05:00) (80 - 84)  BP: --  BP(mean): --  ABP: --  ABP(mean): --  RR: 21 (10 Gus 2022 05:00) (19 - 28)  SpO2: 94% (10 Gus 2022 05:00) (91% - 96%)      CVP(mm Hg): 4 (06-08-22 @ 18:00) (-1 - 7)  CO: 5 (06-08-22 @ 14:30) (5 - 5)  CI: 2.6 (06-08-22 @ 14:30) (2.6 - 2.6)  PA: 68/25 (06-08-22 @ 18:00) (48/24 - 80/31)  PA(mean): 38 (06-08-22 @ 18:00) (29 - 50)  PA(direct): --  PCWP: --  LA: --  RA: --  SVR: 1326 (06-08-22 @ 14:30) (1326 - 1326)  SVRI: 2550 (06-08-22 @ 14:30) (2550 - 2550)  PVR: --  PVRI: --  I&O's Summary    08 Jun 2022 07:01  -  09 Jun 2022 07:00  --------------------------------------------------------  IN: 1305.2 mL / OUT: 2595 mL / NET: -1289.8 mL    09 Jun 2022 07:01  -  10 Gus 2022 06:33  --------------------------------------------------------  IN: 1341.6 mL / OUT: 1550 mL / NET: -208.4 mL        CAPILLARY BLOOD GLUCOSE    CAPILLARY BLOOD GLUCOSE      POCT Blood Glucose.: 134 mg/dL (09 Jun 2022 20:54)      PHYSICAL EXAM:  GENERAL: No acute distress, well-developed  HEAD:  Atraumatic, Normocephalic  EYES: EOMI, PERRLA, conjunctiva and sclera clear  NECK: Supple, no lymphadenopathy, no JVD  CHEST/LUNG: CTAB; No wheezes, rales, or rhonchi  HEART: Regular rate and rhythm. Normal S1/S2. No murmurs, rubs, or gallops  ABDOMEN: Soft, non-tender, non-distended; normal bowel sounds, no organomegaly  EXTREMITIES:  2+ peripheral pulses b/l, No clubbing, cyanosis, or edema  NEUROLOGY: A&O x 3, no focal deficits  SKIN: No rashes or lesions    ============================I/O===========================   I&O's Detail    08 Jun 2022 07:01  -  09 Jun 2022 07:00  --------------------------------------------------------  IN:    Heparin: 284.5 mL    IV PiggyBack: 50 mL    Milrinone: 250.7 mL    Oral Fluid: 720 mL  Total IN: 1305.2 mL    OUT:    Voided (mL): 2595 mL  Total OUT: 2595 mL    Total NET: -1289.8 mL      09 Jun 2022 07:01  -  10 Gus 2022 06:33  --------------------------------------------------------  IN:    Heparin: 300 mL    Milrinone: 261.6 mL    Oral Fluid: 780 mL  Total IN: 1341.6 mL    OUT:    Voided (mL): 1550 mL  Total OUT: 1550 mL    Total NET: -208.4 mL        ============================ LABS =========================                        13.0   13.55 )-----------( 218      ( 10 Gus 2022 04:35 )             38.2     06-10    132<L>  |  100  |  21  ----------------------------<  166<H>  4.2   |  22  |  1.44<H>    Ca    9.0      10 Gus 2022 04:35  Phos  3.2     06-10  Mg     1.8     06-10    TPro  6.4  /  Alb  3.2<L>  /  TBili  0.6  /  DBili  x   /  AST  21  /  ALT  24  /  AlkPhos  64  06-10                LIVER FUNCTIONS - ( 10 Gus 2022 04:35 )  Alb: 3.2 g/dL / Pro: 6.4 g/dL / ALK PHOS: 64 U/L / ALT: 24 U/L / AST: 21 U/L / GGT: x           PT/INR - ( 10 Gus 2022 04:35 )   PT: 13.6 sec;   INR: 1.17 ratio         PTT - ( 10 Gus 2022 04:35 )  PTT:55.9 sec    Lactate, Blood: 1.1 mmol/L (06-10-22 @ 04:34)  Blood Gas Venous - Lactate: 1.3 mmol/L (06-09-22 @ 05:41)  Blood Gas Venous - Lactate: 1.1 mmol/L (06-08-22 @ 14:35)        ======================Micro/Rad/Cardio=================  Telemetry: Reviewed   EKG: Reviewed  CXR: Reviewed  Culture: Reviewed   Echo:   Cath:   ======================================================  PAST MEDICAL & SURGICAL HISTORY:  AV block  Essential hypertension  Chronic HFrEF (heart failure with reduced ejection fraction)  Chronic kidney disease, unspecified CKD stage  CAD (coronary artery disease)  HLD (hyperlipidemia)  Type 2 diabetes mellitus  Artificial cardiac pacemaker    ====================ASSESSMENT ==============  65 y/o male w/ PMHx HTN, HLD, type 2 DM, chronic HFrEF (EF 25-30% by Echo), complete heart block s/p PPM (in 2006, upgraded to BiV-ICD in 09/2016), CAD (s/p recent BERNABE mid LAD at [facility] on 04/18/2022), and stage II CKD (baseline Cr ~1.3) presented with near syncope to OSH found to have 41 episodes of VT on device, s/p unsuccessful VT ablation and transferred to [facility] for management of cardiogenic shock and advanced therapy eval.     Plan:  ====================== NEUROLOGY=====================   AAOx3  - No active issues  - Tylenol PRN for fevers/pain    acetaminophen     Tablet .. 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 RV enlargement with decreased RV systolic function.  - C 5/23: patent mLAD stent with slow flow, D1 with 40-50% stenosis  - RHC 5/26: RA 12, PA 70/40 (50), PCWP 22, Milana CO/CI 2.3/1.3, MAP 83 with SVR 2469, PVR 12 PERSAUD  - 5/27: RA 10, PA 76/35 (49), PCWP 34, PA sat 57% with Milana CO/CI 3.1/1.6, MAP 71 with SVR 1574, PVR 4.8  - 6/6 RHC: CVP 17, RV 75/4, PA 80/36, PCW 22, CI 1.88. IABP placed and pt transferred to CICU.   - Maintain IABP 1:1, monitor PTT , therapeutic to 55.9 today  - Maintain Milrinone .5mcg/kg/min   - Cont hydral 100 TID and ISDN 40TID for AL Reduction, Assisted mean goal 70-80   - monitor hemodynamics and perfusion indices   - HF following, pending OHT Listing     # VTach   - s/p EPS on 5/24, unable to perform ablation as no substrate found and did not induce VT because of severely low EF   - Interrogation of ICD @Dr Wilson's office 5/20: back-to-back episodes of VT @ 200+ bpm all terminating with ATP. Since last cath pt has had 41 VT episodes (all falling into VF zone)  - Normal device function. BIV pacing 97%. No programming changes made  - c/w Amiodarone  - c/w Toprol 25 daily   - no further VT on inotropes     # CAD   - Chest pain free and compliant with DAPT since last PCI 4/18/22/ per discussion with interventional at OSH, ok to DC plavix, last dose 5/28  - Cardiac cath @[facility] 4/18/22: mLAD 90% s/p BERNABE, LCx mild disease, RCA mild disease s/p diagnostic cardiac cath w/ Dr Wagner on 05/23/2022   - Cont ASA and Lipitor     aspirin enteric coated 81 milliGRAM(s) Oral daily  atorvastatin 80 milliGRAM(s) Oral at bedtime  aMIOdarone    Tablet 200 milliGRAM(s) Oral daily  hydrALAZINE 100 milliGRAM(s) Oral every 8 hours  isosorbide   dinitrate Tablet (ISORDIL) 40 milliGRAM(s) Oral three times a day  metoprolol succinate ER 25 milliGRAM(s) Oral daily  milrinone Infusion 0.5 MICROgram(s)/kG/Min (11 mL/Hr) IV Continuous <Continuous>    ===================HEMATOLOGIC/ONC ===================  Secondary Polycythemia Vera   - elevated EPO, hgb in normal range  - Heme following peripherally  - f/u MELINA-2     AC therapy   - Heparin gtt for IABP, low ptt goal     heparin  Infusion 1200 Unit(s)/Hr (12.5 mL/Hr) IV Continuous <Continuous>    ===================== RENAL =========================  JAGRUTI on CKD II  Admitted w/ Cr 2.01 (baseline Cr ~1.3) was downtrending with inotropic support, now rising 1.12->1.29   - likely i/s/o cardiogenic shock given RHC findings and CI of 1.88  - Avoid nephrotoxic agents, NSAIDs. Renally dose meds.  - monitor strict IOs and continue current cardiac support     ==================== GASTROINTESTINAL===================  Consistent Carb Diet:  - Tolerating PO diet    Constipation:  - c/w bowel regimen Miralax, Senna, doculax suppository PRN last BM 6/9  - s/p colonoscopy 6/3 with 5 polyps removed and biopsied, benign findings- GI recs for 3 yr follow up colo     bisacodyl Suppository 10 milliGRAM(s) Rectal daily PRN Constipation  pantoprazole    Tablet 40 milliGRAM(s) Oral before breakfast  polyethylene glycol 3350 17 Gram(s) Oral daily  senna 1 Tablet(s) Oral at bedtime  simethicone 80 milliGRAM(s) Chew every 6 hours PRN Gas    =======================    ENDOCRINE  =====================  T2DM (A1C 6.2 on admission)  - Cont. ISS, glucose controlled, monitor FS closely     Gout flare, Left knee (previously R)  - continue allopurinol, s/p prednisone     allopurinol 100 milliGRAM(s) Oral daily  insulin lispro (ADMELOG) corrective regimen sliding scale   SubCutaneous at bedtime  insulin lispro (ADMELOG) corrective regimen sliding scale   SubCutaneous three times a day before meals    ========================INFECTIOUS DISEASE================  RUE Thrombophlebitis  - RUE US Duplex showing superficial thrombus  - s/p ancef      Febrile 6/7-8 overnight x1   - UA neg, BCx on 6/7 NGTD x2, RVP neg   - follow fever curve  - d/c'd swan        ======================= LINES/TUBES  =====================  R fem IABP (6/6)  RIJ Cordia (6/6)  ======================= DISPOSITION  =====================  [ ] Critical   [ ] Guarded    [X ] Stable    [X ] Maintain in CICU  [ ] Downgrade to Telemtry  [ ] Discharge Home    Patient requires continuous monitoring with bedside rhythm monitoring, pulse ox monitoring, and intermittent blood gas analysis. Care plan discussed with ICU care team. Patient remained critical and at risk for life threatening decompensation.  Patient seen, examined and plan discussed with CCU team during rounds.

## 2022-06-11 LAB
ALBUMIN SERPL ELPH-MCNC: 3.2 G/DL — LOW (ref 3.3–5)
ALP SERPL-CCNC: 67 U/L — SIGNIFICANT CHANGE UP (ref 40–120)
ALT FLD-CCNC: 30 U/L — SIGNIFICANT CHANGE UP (ref 10–45)
ANION GAP SERPL CALC-SCNC: 13 MMOL/L — SIGNIFICANT CHANGE UP (ref 5–17)
APTT BLD: 42.6 SEC — HIGH (ref 27.5–35.5)
APTT BLD: 46.9 SEC — HIGH (ref 27.5–35.5)
APTT BLD: 52.5 SEC — HIGH (ref 27.5–35.5)
AST SERPL-CCNC: 27 U/L — SIGNIFICANT CHANGE UP (ref 10–40)
BASOPHILS # BLD AUTO: 0.05 K/UL — SIGNIFICANT CHANGE UP (ref 0–0.2)
BASOPHILS NFR BLD AUTO: 0.4 % — SIGNIFICANT CHANGE UP (ref 0–2)
BILIRUB SERPL-MCNC: 0.4 MG/DL — SIGNIFICANT CHANGE UP (ref 0.2–1.2)
BUN SERPL-MCNC: 16 MG/DL — SIGNIFICANT CHANGE UP (ref 7–23)
CALCIUM SERPL-MCNC: 9.3 MG/DL — SIGNIFICANT CHANGE UP (ref 8.4–10.5)
CHLORIDE SERPL-SCNC: 99 MMOL/L — SIGNIFICANT CHANGE UP (ref 96–108)
CO2 SERPL-SCNC: 20 MMOL/L — LOW (ref 22–31)
CREAT SERPL-MCNC: 1.1 MG/DL — SIGNIFICANT CHANGE UP (ref 0.5–1.3)
EGFR: 75 ML/MIN/1.73M2 — SIGNIFICANT CHANGE UP
EOSINOPHIL # BLD AUTO: 0.4 K/UL — SIGNIFICANT CHANGE UP (ref 0–0.5)
EOSINOPHIL NFR BLD AUTO: 3 % — SIGNIFICANT CHANGE UP (ref 0–6)
GLUCOSE BLDC GLUCOMTR-MCNC: 143 MG/DL — HIGH (ref 70–99)
GLUCOSE BLDC GLUCOMTR-MCNC: 149 MG/DL — HIGH (ref 70–99)
GLUCOSE BLDC GLUCOMTR-MCNC: 157 MG/DL — HIGH (ref 70–99)
GLUCOSE BLDC GLUCOMTR-MCNC: 162 MG/DL — HIGH (ref 70–99)
GLUCOSE SERPL-MCNC: 167 MG/DL — HIGH (ref 70–99)
HCT VFR BLD CALC: 40.7 % — SIGNIFICANT CHANGE UP (ref 39–50)
HGB BLD-MCNC: 13.6 G/DL — SIGNIFICANT CHANGE UP (ref 13–17)
IMM GRANULOCYTES NFR BLD AUTO: 0.5 % — SIGNIFICANT CHANGE UP (ref 0–1.5)
INR BLD: 1.19 RATIO — HIGH (ref 0.88–1.16)
LACTATE SERPL-SCNC: 1.4 MMOL/L — SIGNIFICANT CHANGE UP (ref 0.7–2)
LYMPHOCYTES # BLD AUTO: 1.91 K/UL — SIGNIFICANT CHANGE UP (ref 1–3.3)
LYMPHOCYTES # BLD AUTO: 14.2 % — SIGNIFICANT CHANGE UP (ref 13–44)
MAGNESIUM SERPL-MCNC: 1.8 MG/DL — SIGNIFICANT CHANGE UP (ref 1.6–2.6)
MCHC RBC-ENTMCNC: 29.8 PG — SIGNIFICANT CHANGE UP (ref 27–34)
MCHC RBC-ENTMCNC: 33.4 GM/DL — SIGNIFICANT CHANGE UP (ref 32–36)
MCV RBC AUTO: 89.3 FL — SIGNIFICANT CHANGE UP (ref 80–100)
MONOCYTES # BLD AUTO: 1.47 K/UL — HIGH (ref 0–0.9)
MONOCYTES NFR BLD AUTO: 11 % — SIGNIFICANT CHANGE UP (ref 2–14)
NEUTROPHILS # BLD AUTO: 9.52 K/UL — HIGH (ref 1.8–7.4)
NEUTROPHILS NFR BLD AUTO: 70.9 % — SIGNIFICANT CHANGE UP (ref 43–77)
NRBC # BLD: 0 /100 WBCS — SIGNIFICANT CHANGE UP (ref 0–0)
PHOSPHATE SERPL-MCNC: 2.3 MG/DL — LOW (ref 2.5–4.5)
PLATELET # BLD AUTO: 218 K/UL — SIGNIFICANT CHANGE UP (ref 150–400)
POTASSIUM SERPL-MCNC: 4.4 MMOL/L — SIGNIFICANT CHANGE UP (ref 3.5–5.3)
POTASSIUM SERPL-SCNC: 4.4 MMOL/L — SIGNIFICANT CHANGE UP (ref 3.5–5.3)
PROT SERPL-MCNC: 6.7 G/DL — SIGNIFICANT CHANGE UP (ref 6–8.3)
PROTHROM AB SERPL-ACNC: 13.7 SEC — HIGH (ref 10.5–13.4)
RBC # BLD: 4.56 M/UL — SIGNIFICANT CHANGE UP (ref 4.2–5.8)
RBC # FLD: 15.4 % — HIGH (ref 10.3–14.5)
SODIUM SERPL-SCNC: 132 MMOL/L — LOW (ref 135–145)
WBC # BLD: 13.42 K/UL — HIGH (ref 3.8–10.5)
WBC # FLD AUTO: 13.42 K/UL — HIGH (ref 3.8–10.5)

## 2022-06-11 PROCEDURE — 99291 CRITICAL CARE FIRST HOUR: CPT

## 2022-06-11 PROCEDURE — 71045 X-RAY EXAM CHEST 1 VIEW: CPT | Mod: 26

## 2022-06-11 PROCEDURE — 93010 ELECTROCARDIOGRAM REPORT: CPT

## 2022-06-11 RX ORDER — MAGNESIUM SULFATE 500 MG/ML
2 VIAL (ML) INJECTION ONCE
Refills: 0 | Status: COMPLETED | OUTPATIENT
Start: 2022-06-11 | End: 2022-06-11

## 2022-06-11 RX ADMIN — Medication 100 MILLIGRAM(S): at 05:05

## 2022-06-11 RX ADMIN — ISOSORBIDE DINITRATE 40 MILLIGRAM(S): 5 TABLET ORAL at 17:27

## 2022-06-11 RX ADMIN — AMIODARONE HYDROCHLORIDE 200 MILLIGRAM(S): 400 TABLET ORAL at 05:05

## 2022-06-11 RX ADMIN — ISOSORBIDE DINITRATE 40 MILLIGRAM(S): 5 TABLET ORAL at 05:05

## 2022-06-11 RX ADMIN — HEPARIN SODIUM 13 UNIT(S)/HR: 5000 INJECTION INTRAVENOUS; SUBCUTANEOUS at 07:21

## 2022-06-11 RX ADMIN — Medication 81 MILLIGRAM(S): at 11:09

## 2022-06-11 RX ADMIN — LIDOCAINE 1 PATCH: 4 CREAM TOPICAL at 02:33

## 2022-06-11 RX ADMIN — ATORVASTATIN CALCIUM 80 MILLIGRAM(S): 80 TABLET, FILM COATED ORAL at 21:07

## 2022-06-11 RX ADMIN — Medication 650 MILLIGRAM(S): at 05:06

## 2022-06-11 RX ADMIN — CHLORHEXIDINE GLUCONATE 1 APPLICATION(S): 213 SOLUTION TOPICAL at 05:06

## 2022-06-11 RX ADMIN — Medication 1: at 17:30

## 2022-06-11 RX ADMIN — HEPARIN SODIUM 13.5 UNIT(S)/HR: 5000 INJECTION INTRAVENOUS; SUBCUTANEOUS at 11:38

## 2022-06-11 RX ADMIN — Medication 63.75 MILLIMOLE(S): at 06:02

## 2022-06-11 RX ADMIN — Medication 100 MILLIGRAM(S): at 21:07

## 2022-06-11 RX ADMIN — Medication 1: at 07:21

## 2022-06-11 RX ADMIN — HEPARIN SODIUM 13 UNIT(S)/HR: 5000 INJECTION INTRAVENOUS; SUBCUTANEOUS at 05:04

## 2022-06-11 RX ADMIN — Medication 650 MILLIGRAM(S): at 06:38

## 2022-06-11 RX ADMIN — Medication 25 GRAM(S): at 05:23

## 2022-06-11 RX ADMIN — ISOSORBIDE DINITRATE 40 MILLIGRAM(S): 5 TABLET ORAL at 11:09

## 2022-06-11 RX ADMIN — Medication 100 MILLIGRAM(S): at 13:44

## 2022-06-11 RX ADMIN — CHLORHEXIDINE GLUCONATE 1 APPLICATION(S): 213 SOLUTION TOPICAL at 05:26

## 2022-06-11 RX ADMIN — Medication 25 MILLIGRAM(S): at 05:04

## 2022-06-11 RX ADMIN — PANTOPRAZOLE SODIUM 40 MILLIGRAM(S): 20 TABLET, DELAYED RELEASE ORAL at 05:05

## 2022-06-11 RX ADMIN — Medication 100 MILLIGRAM(S): at 10:53

## 2022-06-11 RX ADMIN — MILRINONE LACTATE 11 MICROGRAM(S)/KG/MIN: 1 INJECTION, SOLUTION INTRAVENOUS at 07:21

## 2022-06-11 NOTE — PROGRESS NOTE ADULT - PROBLEM SELECTOR PLAN 5
- hx of VT s/p unsuccessful VT ablation  - continue on amio 200 mg PO QD  - since being admitted to [facility] has not had any episodes of VT, currently tolerating high dose milrinone.  -paced at 80 on telemetry with few PVCs

## 2022-06-11 NOTE — PROGRESS NOTE ADULT - ASSESSMENT
65 YO M with a history of ACC/AHA Stage D mixed NICM/ICM (likely familial with strong FH and early arrhythmia history in his 30's) with LVED 5.2 cm and LVEF 10-15% s/p PPM upgraded to CRT-D, CAD s/p PCI to mLAD 4/2022, well controlled DM2 (A1c 6.2%), and CKD III (Cr 1.4) who initially presented to [facility] 5/20 with near syncope in setting of worsening HF symptoms and found to have 41 episodes of VT, many terminating with ATP. LHC did not reveal new obstructive CAD and he underwent EPS which did not reveal endocardial substrate amenable for ablation. RHC revealed severely depressed cardiac output and he was transferred to [facility] 5/26 for advanced therapies evaluation.    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 He improved significantly with sequential uptitration of inotropes and increased pacing rate, but on 6/6 he had worsening JAGRUTI. He is s/p RHC which revealed severely elevated filling pressures, severe cpcPH, and CI of 1.9 on milrinone 0.5. IABP was placed and his renal function/PAPs have improved. He is MCS dependent and was inadequately supported with high dose inotropes, so was listed UNOS status 2e for OHT, awaiting suitable donor. However, was made status 7 as he became febrile a few days ago. He has been afebrile since and blood cultures from 6/7 with NGTD x 48 hours and his leukocytosis has not worsened. Discussed with transplant ID and since bld cx negative x48hrs he has been re-listed UNOS status 2e.     Review of studies  TTE 5/21: LV 5.2 cm, LVEF 10-15%, LVOT VTI 10 cm, moderate RV dysfunction, mild AI, minimal MR  EKG: a-BiV paced  [facility] 5/23: patent mLAD stent with slow flow, D1 with 40-50% stenosis, mild disease otherwise  RHC 5/26: RA 12, PA 70/40 (50), PCWP 22, Milana CO/CI 2.3/1.3, MAP 83 with SVR 2469, PVR 12 PERSAUD    Hemodynamics  5/27 (milrinone 0.25): RA 10, PA 76/35 (49), PCWP 34, PA sat 57% with Milana CO/CI 3.1/1.6, MAP 71 with SVR 1574, PVR 4.8  5/28 (on milrinone 0.375): RA 7, PA 63/31, PCWP 26, PA sat 72.8% with Milana CO/CI 4.9/2.5 (CI later dropped to 1.6 in the afternoon), MAP 70 with SVR 1039, PVR 2.9  5/29 (on milrinone 0.5): RA 8, PA 75/32 (50), PCWP 28, PA sat 74% with Milana CO/CI 5.0/2.6, MAP 77 with SVR 1200, PVR 4.4  5/29 (on milrinone 0.5 and nipride to 3 mcg/kg/min): RA 6, PA 59/31 (40), PCWP 30, PA sat 79% with Milana CO/CI 7.0/3.6, BP 97/57 (MAP 70) with , PVR 1.4  6/6 RHC (mil 0.5): RA 11 (v13), RV 75/17, PA 80/36/52, PCWP 24 (v29), PA sat 59.5%, CO/CI (F) 3.58/1.88  6/7 (mil 0.5, IABP 1:1): CVP 5, PA 66/32/45, PA sat 65.7%, CO/CI (F) 4.0/2.1, SVR 2000  6/8 (mil 0.5, IABP 1:1): CVP 1, PA 46/19/28, PA sat 72.7%, CO/CI (F) 5.6/2.9, SVR 1257  6/8 PM (mil 0.5, IABP 1:1): CVP 4, PA 68/25/38, PA sat 68%, CO/CI (f) 5/2.6, SVR 1326

## 2022-06-11 NOTE — PROGRESS NOTE ADULT - SUBJECTIVE AND OBJECTIVE BOX
HPI:  66M Mixed Ischemic/NICM Cardiomyopathy (EF 25-30% at baseline, s/p BIV-ICD), CAD (medically managed MIs in ,, Most recent stent in 2022 to mLAD) presented with multiple near syncope, found to have 41 episodes of VT and admitted initially to cardiac telemetry for further evaluation/management. Ischemic evaluation without new disease and VT ablation without substrate to complete ablation.   Transferred from [facility] for further management and evaluation for LVAD vs OHT.    (26 May 2022 20:31)    INTERVAL HISTORY:  - No acute events    MEDICATIONS  (STANDING):  allopurinol 100 milliGRAM(s) Oral daily  aMIOdarone    Tablet 200 milliGRAM(s) Oral daily  aspirin enteric coated 81 milliGRAM(s) Oral daily  atorvastatin 80 milliGRAM(s) Oral at bedtime  chlorhexidine 2% Cloths 1 Application(s) Topical daily  chlorhexidine 4% Liquid 1 Application(s) Topical <User Schedule>  heparin  Infusion 1200 Unit(s)/Hr (13 mL/Hr) IV Continuous <Continuous>  hydrALAZINE 100 milliGRAM(s) Oral every 8 hours  insulin lispro (ADMELOG) corrective regimen sliding scale   SubCutaneous at bedtime  insulin lispro (ADMELOG) corrective regimen sliding scale   SubCutaneous three times a day before meals  isosorbide   dinitrate Tablet (ISORDIL) 40 milliGRAM(s) Oral three times a day  lidocaine   4% Patch 1 Patch Transdermal daily  metoprolol succinate ER 25 milliGRAM(s) Oral daily  milrinone Infusion 0.5 MICROgram(s)/kG/Min (11 mL/Hr) IV Continuous <Continuous>  pantoprazole    Tablet 40 milliGRAM(s) Oral before breakfast  polyethylene glycol 3350 17 Gram(s) Oral daily  senna 1 Tablet(s) Oral at bedtime    MEDICATIONS  (PRN):  acetaminophen     Tablet .. 650 milliGRAM(s) Oral every 6 hours PRN Temp greater or equal to 38C (100.4F), Mild Pain (1 - 3)  bisacodyl Suppository 10 milliGRAM(s) Rectal daily PRN Constipation  simethicone 80 milliGRAM(s) Chew every 6 hours PRN Gas      Objective:  ICU Vital Signs Last 24 Hrs  T(C): 37.4 (2022 03:00), Max: 37.9 (10 Gus 2022 19:00)  T(F): 99.3 (2022 03:00), Max: 100.3 (10 Gus 2022 19:00)  HR: 80 (2022 07:00) (80 - 112)  BP: 92/53 (10 Gus 2022 10:00) (92/53 - 92/53)  BP(mean): 66 (10 Gus 2022 10:00) (66 - 66)  RR: 21 (2022 07:00) (17 - 32)  SpO2: 91% (2022 07:00) (91% - 96%)      06-10 @ 07:01  -  -11 @ 07:00  --------------------------------------------------------  IN: 1835.8 mL / OUT: 2250 mL / NET: -414.2 mL    Daily Weight in k.9 (2022 05:00)    PHYSICAL EXAM:  GENERAL: No acute distress, well-developed  HEAD:  Atraumatic, Normocephalic  EYES: EOMI, PERRLA, conjunctiva and sclera clear  NECK: Supple, no lymphadenopathy, no JVD  CHEST/LUNG: CTAB; No wheezes, rales, or rhonchi  HEART: Regular rate and rhythm. Normal S1/S2. No murmurs, rubs, or gallops  ABDOMEN: Soft, non-tender, non-distended; normal bowel sounds, no organomegaly  EXTREMITIES:  2+ peripheral pulses b/l, No clubbing, cyanosis, or edema  NEUROLOGY: A&O x 3, no focal deficits  SKIN: No rashes or lesions        Labs:                          13.6   13.42 )-----------( 218      ( 2022 04:28 )             40.7     06-11    132<L>  |  99  |  16  ----------------------------<  167<H>  4.4   |  20<L>  |  1.10    Ca    9.3      2022 04:28  Phos  2.3     06-11  Mg     1.8     06-11    TPro  6.7  /  Alb  3.2<L>  /  TBili  0.4  /  DBili  x   /  AST  27  /  ALT  30  /  AlkPhos  67      LIVER FUNCTIONS - ( 2022 04:28 )  Alb: 3.2 g/dL / Pro: 6.7 g/dL / ALK PHOS: 67 U/L / ALT: 30 U/L / AST: 27 U/L / GGT: x           PT/INR - ( 2022 04:28 )   PT: 13.7 sec;   INR: 1.19 ratio    PTT - ( 2022 04:28 )  PTT:42.6 sec        ====================ASSESSMENT ==============  67 y/o male w/ PMHx HTN, HLD, type 2 DM, chronic HFrEF (EF 25-30% by Echo), complete heart block s/p PPM (in , upgraded to BiV-ICD in 2016), CAD (s/p recent BERNABE mid LAD at [facility] on 2022), and stage II CKD (baseline Cr ~1.3) presented with near syncope to OSH found to have 41 episodes of VT on device, s/p unsuccessful VT ablation and transferred to [facility] for management of cardiogenic shock and advanced therapy eval.     Plan:  ====================== NEUROLOGY=====================   AAOx3  - No active issues  - Tylenol PRN for fevers/pain    acetaminophen     Tablet .. 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 RV enlargement with decreased RV systolic function.  - LHC : patent mLAD stent with slow flow, D1 with 40-50% stenosis  - RHC : RA 12, PA 70/40 (50), PCWP 22, Milana CO/CI 2.3/1.3, MAP 83 with SVR 2469, PVR 12 PERSAUD  - : RA 10, PA 76/35 (49), PCWP 34, PA sat 57% with Milana CO/CI 3.1/1.6, MAP 71 with SVR 1574, PVR 4.8  - 6/6 RHC: CVP 17, RV 75/4, PA 80/36, PCW 22, CI 1.88. IABP placed and pt transferred to CICU.   - Maintain IABP 1:1, monitor PTT , therapeutic to 55.9 today  - Maintain Milrinone .5mcg/kg/min   - Cont hydral 100 TID and ISDN 40TID for AL Reduction, Assisted mean goal 70-80   - monitor hemodynamics and perfusion indices   - HF following, pending OHT Listing     # VTach   - s/p EPS on , unable to perform ablation as no substrate found and did not induce VT because of severely low EF   - Interrogation of ICD @Dr Wilson's office : back-to-back episodes of VT @ 200+ bpm all terminating with ATP. Since last cath pt has had 41 VT episodes (all falling into VF zone)  - Normal device function. BIV pacing 97%. No programming changes made  - c/w Amiodarone  - c/w Toprol 25 daily   - no further VT on inotropes     # CAD   - Chest pain free and compliant with DAPT since last PCI 22/ per discussion with interventional at OSH, ok to DC plavix, last dose   - Cardiac cath @[facility] 22: mLAD 90% s/p BERNABE, LCx mild disease, RCA mild disease s/p diagnostic cardiac cath w/ Dr Wagner on 2022   - Cont ASA and Lipitor     aspirin enteric coated 81 milliGRAM(s) Oral daily  atorvastatin 80 milliGRAM(s) Oral at bedtime  aMIOdarone    Tablet 200 milliGRAM(s) Oral daily  hydrALAZINE 100 milliGRAM(s) Oral every 8 hours  isosorbide   dinitrate Tablet (ISORDIL) 40 milliGRAM(s) Oral three times a day  metoprolol succinate ER 25 milliGRAM(s) Oral daily  milrinone Infusion 0.5 MICROgram(s)/kG/Min (11 mL/Hr) IV Continuous <Continuous>    ===================HEMATOLOGIC/ONC ===================  Secondary Polycythemia Vera   - elevated EPO, hgb in normal range  - Heme following peripherally  - f/u MELINA-2     AC therapy   - Heparin gtt for IABP, low ptt goal     heparin  Infusion 1200 Unit(s)/Hr (12.5 mL/Hr) IV Continuous <Continuous>    ===================== RENAL =========================  JAGRUTI on CKD II  Admitted w/ Cr 2.01 (baseline Cr ~1.3) was downtrending with inotropic support, now rising 1.12->1.29   - likely i/s/o cardiogenic shock given RHC findings and CI of 1.88  - Avoid nephrotoxic agents, NSAIDs. Renally dose meds.  - monitor strict IOs and continue current cardiac support     ==================== GASTROINTESTINAL===================  Consistent Carb Diet:  - Tolerating PO diet    Constipation:  - c/w bowel regimen Miralax, Senna, doculax suppository PRN last BM   - s/p colonoscopy 6/3 with 5 polyps removed and biopsied, benign findings- GI recs for 3 yr follow up colo     bisacodyl Suppository 10 milliGRAM(s) Rectal daily PRN Constipation  pantoprazole    Tablet 40 milliGRAM(s) Oral before breakfast  polyethylene glycol 3350 17 Gram(s) Oral daily  senna 1 Tablet(s) Oral at bedtime  simethicone 80 milliGRAM(s) Chew every 6 hours PRN Gas    =======================    ENDOCRINE  =====================  T2DM (A1C 6.2 on admission)  - Cont. ISS, glucose controlled, monitor FS closely     Gout flare, Left knee (previously R)  - continue allopurinol, s/p prednisone     allopurinol 100 milliGRAM(s) Oral daily  insulin lispro (ADMELOG) corrective regimen sliding scale   SubCutaneous at bedtime  insulin lispro (ADMELOG) corrective regimen sliding scale   SubCutaneous three times a day before meals    ========================INFECTIOUS DISEASE================  RUE Thrombophlebitis  - RUE US Duplex showing superficial thrombus  - s/p ancef      Febrile -8 overnight x1   - UA neg, BCx on  NGTD x2, RVP neg   - follow fever curve  - d/c'd bethelan        ======================= LINES/TUBES  =====================  R fem IABP ()  RIJ Cordia ()   HPI:  66M Mixed Ischemic/NICM Cardiomyopathy (EF 25-30% at baseline, s/p BIV-ICD), CAD (medically managed MIs in ,, Most recent stent in 2022 to mLAD) presented with multiple near syncope, found to have 41 episodes of VT and admitted initially to cardiac telemetry for further evaluation/management. Ischemic evaluation without new disease and VT ablation without substrate to complete ablation.   Transferred from [facility] for further management and evaluation for LVAD vs OHT.    (26 May 2022 20:31)    INTERVAL HISTORY:  - febrile yesterday, swan, intro, and arterial line removed.    MEDICATIONS  (STANDING):  allopurinol 100 milliGRAM(s) Oral daily  aMIOdarone    Tablet 200 milliGRAM(s) Oral daily  aspirin enteric coated 81 milliGRAM(s) Oral daily  atorvastatin 80 milliGRAM(s) Oral at bedtime  chlorhexidine 2% Cloths 1 Application(s) Topical daily  chlorhexidine 4% Liquid 1 Application(s) Topical <User Schedule>  heparin  Infusion 1200 Unit(s)/Hr (13 mL/Hr) IV Continuous <Continuous>  hydrALAZINE 100 milliGRAM(s) Oral every 8 hours  insulin lispro (ADMELOG) corrective regimen sliding scale   SubCutaneous at bedtime  insulin lispro (ADMELOG) corrective regimen sliding scale   SubCutaneous three times a day before meals  isosorbide   dinitrate Tablet (ISORDIL) 40 milliGRAM(s) Oral three times a day  lidocaine   4% Patch 1 Patch Transdermal daily  metoprolol succinate ER 25 milliGRAM(s) Oral daily  milrinone Infusion 0.5 MICROgram(s)/kG/Min (11 mL/Hr) IV Continuous <Continuous>  pantoprazole    Tablet 40 milliGRAM(s) Oral before breakfast  polyethylene glycol 3350 17 Gram(s) Oral daily  senna 1 Tablet(s) Oral at bedtime    MEDICATIONS  (PRN):  acetaminophen     Tablet .. 650 milliGRAM(s) Oral every 6 hours PRN Temp greater or equal to 38C (100.4F), Mild Pain (1 - 3)  bisacodyl Suppository 10 milliGRAM(s) Rectal daily PRN Constipation  simethicone 80 milliGRAM(s) Chew every 6 hours PRN Gas      Objective:  ICU Vital Signs Last 24 Hrs  T(C): 37.4 (2022 03:00), Max: 37.9 (10 Gus 2022 19:00)  T(F): 99.3 (2022 03:00), Max: 100.3 (10 Gus 2022 19:00)  HR: 80 (2022 07:00) (80 - 112)  BP: 92/53 (10 Gus 2022 10:00) (92/53 - 92/53)  BP(mean): 66 (10 Gus 2022 10:00) (66 - 66)  RR: 21 (2022 07:00) (17 - 32)  SpO2: 91% (2022 07:00) (91% - 96%)      06-10 @ 07:01  -  - @ 07:00  --------------------------------------------------------  IN: 1835.8 mL / OUT: 2250 mL / NET: -414.2 mL    Daily Weight in k.9 (2022 05:00)    PHYSICAL EXAM:  GENERAL: No acute distress, well-developed  HEAD:  Atraumatic, Normocephalic  EYES: EOMI, PERRLA, conjunctiva and sclera clear  NECK: Supple, no lymphadenopathy, no JVD  CHEST/LUNG: CTAB; No wheezes, rales, or rhonchi  HEART: Regular rate and rhythm. Normal S1/S2. No murmurs, rubs, or gallops  ABDOMEN: Soft, non-tender, non-distended; normal bowel sounds, no organomegaly  EXTREMITIES:  2+ peripheral pulses b/l, No clubbing, cyanosis, or edema  NEUROLOGY: A&O x 3, no focal deficits  SKIN: No rashes or lesions        Labs:                          13.6   13.42 )-----------( 218      ( 2022 04:28 )             40.7     06-11    132<L>  |  99  |  16  ----------------------------<  167<H>  4.4   |  20<L>  |  1.10    Ca    9.3      2022 04:28  Phos  2.3     06-  Mg     1.8     06-    TPro  6.7  /  Alb  3.2<L>  /  TBili  0.4  /  DBili  x   /  AST  27  /  ALT  30  /  AlkPhos  67  06-11    LIVER FUNCTIONS - ( 2022 04:28 )  Alb: 3.2 g/dL / Pro: 6.7 g/dL / ALK PHOS: 67 U/L / ALT: 30 U/L / AST: 27 U/L / GGT: x           PT/INR - ( 2022 04:28 )   PT: 13.7 sec;   INR: 1.19 ratio    PTT - ( 2022 04:28 )  PTT:42.6 sec        ====================ASSESSMENT ==============  65 y/o male w/ PMHx HTN, HLD, type 2 DM, chronic HFrEF (EF 25-30% by Echo), complete heart block s/p PPM (in , upgraded to BiV-ICD in 2016), CAD (s/p recent BERNABE mid LAD at [facility] on 2022), and stage II CKD (baseline Cr ~1.3) presented with near syncope to OSH found to have 41 episodes of VT on device, s/p unsuccessful VT ablation and transferred to [facility] for management of cardiogenic shock and advanced therapy eval.     Plan:  ====================== NEUROLOGY=====================   AAOx3  - No active issues  - Tylenol PRN for fevers/pain    acetaminophen     Tablet .. 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 RV enlargement with decreased RV systolic function.  - C : patent mLAD stent with slow flow, D1 with 40-50% stenosis  - RHC : RA 12, PA 70/40 (50), PCWP 22, Milana CO/CI 2.3/1.3, MAP 83 with SVR 2469, PVR 12 PERSAUD  - : RA 10, PA 76/35 (49), PCWP 34, PA sat 57% with Milana CO/CI 3.1/1.6, MAP 71 with SVR 1574, PVR 4.8  - 6/6 RHC: CVP 17, RV 75/4, PA 80/36, PCW 22, CI 1.88. IABP placed and pt transferred to CICU.   - Maintain IABP 1:1, monitor PTT , therapeutic to 55.9 today  - Maintain Milrinone .5mcg/kg/min   - Cont hydral 100 TID and ISDN 40TID for AL Reduction, Assisted mean goal 70-80   - monitor hemodynamics and perfusion indices   - HF following, pending OHT Listing     # VTach   - s/p EPS on , unable to perform ablation as no substrate found and did not induce VT because of severely low EF   - Interrogation of ICD @Dr Wilson's office : back-to-back episodes of VT @ 200+ bpm all terminating with ATP. Since last cath pt has had 41 VT episodes (all falling into VF zone)  - Normal device function. BIV pacing 97%. No programming changes made  - c/w Amiodarone  - c/w Toprol 25 daily   - no further VT on inotropes     # CAD   - Chest pain free and compliant with DAPT since last PCI 22/ per discussion with interventional at OSH, ok to DC plavix, last dose   - Cardiac cath @[facility] 22: mLAD 90% s/p BERNABE, LCx mild disease, RCA mild disease s/p diagnostic cardiac cath w/ Dr Wagner on 2022   - Cont ASA and Lipitor     aspirin enteric coated 81 milliGRAM(s) Oral daily  atorvastatin 80 milliGRAM(s) Oral at bedtime  aMIOdarone    Tablet 200 milliGRAM(s) Oral daily  hydrALAZINE 100 milliGRAM(s) Oral every 8 hours  isosorbide   dinitrate Tablet (ISORDIL) 40 milliGRAM(s) Oral three times a day  metoprolol succinate ER 25 milliGRAM(s) Oral daily  milrinone Infusion 0.5 MICROgram(s)/kG/Min (11 mL/Hr) IV Continuous <Continuous>    ===================HEMATOLOGIC/ONC ===================  Secondary Polycythemia Vera   - elevated EPO, hgb in normal range  - Heme following peripherally  - f/u MELINA-2     AC therapy   - Heparin gtt for IABP, low ptt goal     heparin  Infusion 1200 Unit(s)/Hr (12.5 mL/Hr) IV Continuous <Continuous>    ===================== RENAL =========================  JAGRUTI on CKD II  Admitted w/ Cr 2.01 (baseline Cr ~1.3) was downtrending with inotropic support, now rising 1.12->1.29   - likely i/s/o cardiogenic shock given RHC findings and CI of 1.88  - Avoid nephrotoxic agents, NSAIDs. Renally dose meds.  - monitor strict IOs and continue current cardiac support     ==================== GASTROINTESTINAL===================  Consistent Carb Diet:  - Tolerating PO diet    Constipation:  - c/w bowel regimen Miralax, Senna, doculax suppository PRN last BM   - s/p colonoscopy 6/3 with 5 polyps removed and biopsied, benign findings- GI recs for 3 yr follow up colo     bisacodyl Suppository 10 milliGRAM(s) Rectal daily PRN Constipation  pantoprazole    Tablet 40 milliGRAM(s) Oral before breakfast  polyethylene glycol 3350 17 Gram(s) Oral daily  senna 1 Tablet(s) Oral at bedtime  simethicone 80 milliGRAM(s) Chew every 6 hours PRN Gas    =======================    ENDOCRINE  =====================  T2DM (A1C 6.2 on admission)  - Cont. ISS, glucose controlled, monitor FS closely     Gout flare, Left knee (previously R)  - continue allopurinol, s/p prednisone     allopurinol 100 milliGRAM(s) Oral daily  insulin lispro (ADMELOG) corrective regimen sliding scale   SubCutaneous at bedtime  insulin lispro (ADMELOG) corrective regimen sliding scale   SubCutaneous three times a day before meals    ========================INFECTIOUS DISEASE================  RUE Thrombophlebitis  - RUE US Duplex showing superficial thrombus  - s/p ancef      Febrile -8 overnight x1   - UA neg, BCx on  NGTD x2, RVP neg   - follow fever curve  - d/c'd swan        ======================= LINES/TUBES  =====================  R fem IABP ()  RIJ Cordia ()   HPI:  66M Mixed Ischemic/NICM Cardiomyopathy (EF 25-30% at baseline, s/p BIV-ICD), CAD (medically managed MIs in ,, Most recent stent in 2022 to mLAD) presented with multiple near syncope, found to have 41 episodes of VT and admitted initially to cardiac telemetry for further evaluation/management. Ischemic evaluation without new disease and VT ablation without substrate to complete ablation.   Transferred from [facility] for further management and evaluation for LVAD vs OHT.    (26 May 2022 20:31)    INTERVAL HISTORY:  - febrile yesterday, swan, intro, and arterial line removed. UNOS status 2E for heart transplant    MEDICATIONS  (STANDING):  allopurinol 100 milliGRAM(s) Oral daily  aMIOdarone    Tablet 200 milliGRAM(s) Oral daily  aspirin enteric coated 81 milliGRAM(s) Oral daily  atorvastatin 80 milliGRAM(s) Oral at bedtime  chlorhexidine 2% Cloths 1 Application(s) Topical daily  chlorhexidine 4% Liquid 1 Application(s) Topical <User Schedule>  heparin  Infusion 1200 Unit(s)/Hr (13 mL/Hr) IV Continuous <Continuous>  hydrALAZINE 100 milliGRAM(s) Oral every 8 hours  insulin lispro (ADMELOG) corrective regimen sliding scale   SubCutaneous at bedtime  insulin lispro (ADMELOG) corrective regimen sliding scale   SubCutaneous three times a day before meals  isosorbide   dinitrate Tablet (ISORDIL) 40 milliGRAM(s) Oral three times a day  lidocaine   4% Patch 1 Patch Transdermal daily  metoprolol succinate ER 25 milliGRAM(s) Oral daily  milrinone Infusion 0.5 MICROgram(s)/kG/Min (11 mL/Hr) IV Continuous <Continuous>  pantoprazole    Tablet 40 milliGRAM(s) Oral before breakfast  polyethylene glycol 3350 17 Gram(s) Oral daily  senna 1 Tablet(s) Oral at bedtime    MEDICATIONS  (PRN):  acetaminophen     Tablet .. 650 milliGRAM(s) Oral every 6 hours PRN Temp greater or equal to 38C (100.4F), Mild Pain (1 - 3)  bisacodyl Suppository 10 milliGRAM(s) Rectal daily PRN Constipation  simethicone 80 milliGRAM(s) Chew every 6 hours PRN Gas      Objective:  ICU Vital Signs Last 24 Hrs  T(C): 37.4 (2022 03:00), Max: 37.9 (10 Gus 2022 19:00)  T(F): 99.3 (2022 03:00), Max: 100.3 (10 Gus 2022 19:00)  HR: 80 (2022 07:00) (80 - 112)  BP: 92/53 (10 Gus 2022 10:00) (92/53 - 92/53)  BP(mean): 66 (10 Gus 2022 10:00) (66 - 66)  RR: 21 (2022 07:00) (17 - 32)  SpO2: 91% (2022 07:00) (91% - 96%)      06-10 @ 07:01  -  - @ 07:00  --------------------------------------------------------  IN: 1835.8 mL / OUT: 2250 mL / NET: -414.2 mL    Daily Weight in k.9 (2022 05:00)    PHYSICAL EXAM:  GENERAL: No acute distress, well-developed  HEAD:  Atraumatic, Normocephalic  EYES: EOMI, PERRLA, conjunctiva and sclera clear  NECK: Supple, no lymphadenopathy, no JVD  CHEST/LUNG: CTAB; No wheezes, rales, or rhonchi  HEART: Regular rate and rhythm. Normal S1/S2. No murmurs, rubs, or gallops  ABDOMEN: Soft, non-tender, non-distended; normal bowel sounds, no organomegaly  EXTREMITIES:  2+ peripheral pulses b/l, No clubbing, cyanosis, or edema  NEUROLOGY: A&O x 3, no focal deficits  SKIN: No rashes or lesions        Labs:                          13.6   13.42 )-----------( 218      ( 2022 04:28 )             40.7     06-11    132<L>  |  99  |  16  ----------------------------<  167<H>  4.4   |  20<L>  |  1.10    Ca    9.3      2022 04:28  Phos  2.3     06-11  Mg     1.8     06-11    TPro  6.7  /  Alb  3.2<L>  /  TBili  0.4  /  DBili  x   /  AST  27  /  ALT  30  /  AlkPhos  67  06-11    LIVER FUNCTIONS - ( 2022 04:28 )  Alb: 3.2 g/dL / Pro: 6.7 g/dL / ALK PHOS: 67 U/L / ALT: 30 U/L / AST: 27 U/L / GGT: x           PT/INR - ( 2022 04:28 )   PT: 13.7 sec;   INR: 1.19 ratio    PTT - ( 2022 04:28 )  PTT:42.6 sec        ====================ASSESSMENT ==============  65 y/o male w/ PMHx HTN, HLD, type 2 DM, chronic HFrEF (EF 25-30% by Echo), complete heart block s/p PPM (in , upgraded to BiV-ICD in 2016), CAD (s/p recent BERNAEB mid LAD at [facility] on 2022), and stage II CKD (baseline Cr ~1.3) presented with near syncope to OSH found to have 41 episodes of VT on device, s/p unsuccessful VT ablation and transferred to [facility] for management of cardiogenic shock and advanced therapy eval.     Plan:  ====================== NEUROLOGY=====================   AAOx3  - No active issues  - Tylenol PRN for fevers/pain    acetaminophen     Tablet .. 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 RV enlargement with decreased RV systolic function.  - LHC : patent mLAD stent with slow flow, D1 with 40-50% stenosis  - RHC : RA 12, PA 70/40 (50), PCWP 22, Milana CO/CI 2.3/1.3, MAP 83 with SVR 2469, PVR 12 PERSAUD  - : RA 10, PA 76/35 (49), PCWP 34, PA sat 57% with Milana CO/CI 3.1/1.6, MAP 71 with SVR 1574, PVR 4.8  - 6/6 RHC: CVP 17, RV 75/4, PA 80/36, PCW 22, CI 1.88. IABP placed and pt transferred to CICU.   - Maintain IABP 1:1, monitor PTT , therapeutic to 55.9 today  - Maintain Milrinone .5mcg/kg/min   - Cont hydral 100 TID and ISDN 40TID for AL Reduction, Assisted mean goal 70-80   - monitor hemodynamics and perfusion indices   - HF following, pending OHT Listing     # VTach   - s/p EPS on , unable to perform ablation as no substrate found and did not induce VT because of severely low EF   - Interrogation of ICD @Dr Wilson's office : back-to-back episodes of VT @ 200+ bpm all terminating with ATP. Since last cath pt has had 41 VT episodes (all falling into VF zone)  - Normal device function. BIV pacing 97%. No programming changes made  - c/w Amiodarone  - c/w Toprol 25 daily   - no further VT on inotropes     # CAD   - Chest pain free and compliant with DAPT since last PCI 22/ per discussion with interventional at OSH, ok to DC plavix, last dose   - [facility] Center [facility]: mLAD 90% s/p BERNABE, LCx mild disease, RCA mild disease s/p diagnostic cardiac cath w/ Dr Wagner on 2022   - Cont ASA and Lipitor     aspirin enteric coated 81 milliGRAM(s) Oral daily  atorvastatin 80 milliGRAM(s) Oral at bedtime  aMIOdarone    Tablet 200 milliGRAM(s) Oral daily  hydrALAZINE 100 milliGRAM(s) Oral every 8 hours  isosorbide   dinitrate Tablet (ISORDIL) 40 milliGRAM(s) Oral three times a day  metoprolol succinate ER 25 milliGRAM(s) Oral daily  milrinone Infusion 0.5 MICROgram(s)/kG/Min (11 mL/Hr) IV Continuous <Continuous>    ===================HEMATOLOGIC/ONC ===================  Secondary Polycythemia Vera   - elevated EPO, hgb in normal range  - Heme following peripherally  - f/u MELINA-2     AC therapy   - Heparin gtt for IABP, low ptt goal     heparin  Infusion 1200 Unit(s)/Hr (12.5 mL/Hr) IV Continuous <Continuous>    ===================== RENAL =========================  JAGRUTI on CKD II  Admitted w/ Cr 2.01 (baseline Cr ~1.3) was downtrending with inotropic support, now rising 1.12->1.29   - likely i/s/o cardiogenic shock given RHC findings and CI of 1.88  - Avoid nephrotoxic agents, NSAIDs. Renally dose meds.  - monitor strict IOs and continue current cardiac support     ==================== GASTROINTESTINAL===================  Consistent Carb Diet:  - Tolerating PO diet    Constipation:  - c/w bowel regimen Miralax, Senna, doculax suppository PRN last BM   - s/p colonoscopy 6/3 with 5 polyps removed and biopsied, benign findings- GI recs for 3 yr follow up colo     bisacodyl Suppository 10 milliGRAM(s) Rectal daily PRN Constipation  pantoprazole    Tablet 40 milliGRAM(s) Oral before breakfast  polyethylene glycol 3350 17 Gram(s) Oral daily  senna 1 Tablet(s) Oral at bedtime  simethicone 80 milliGRAM(s) Chew every 6 hours PRN Gas    =======================    ENDOCRINE  =====================  T2DM (A1C 6.2 on admission)  - Cont. ISS, glucose controlled, monitor FS closely     Gout flare, Left knee (previously R)  - continue allopurinol, s/p prednisone     allopurinol 100 milliGRAM(s) Oral daily  insulin lispro (ADMELOG) corrective regimen sliding scale   SubCutaneous at bedtime  insulin lispro (ADMELOG) corrective regimen sliding scale   SubCutaneous three times a day before meals    ========================INFECTIOUS DISEASE================  RUE Thrombophlebitis  - RUE US Duplex showing superficial thrombus  - s/p ancef      Febrile -8 overnight x1   - UA neg, BCx on  NGTD x2, RVP neg   - follow fever curve  - d/c'd swan        ======================= LINES/TUBES  =====================  R fem IABP ()  RIJ Cordia ()   HPI:  66M Mixed Ischemic/NICM Cardiomyopathy (EF 25-30% at baseline, s/p BIV-ICD), CAD (medically managed MIs in ,, Most recent stent in 2022 to mLAD) presented with multiple near syncope, found to have 41 episodes of VT and admitted initially to cardiac telemetry for further evaluation/management. Ischemic evaluation without new disease and VT ablation without substrate to complete ablation.   Transferred from [facility] for further management and evaluation for LVAD vs OHT.    (26 May 2022 20:31)    INTERVAL HISTORY:  - febrile yesterday, swan, intro, and arterial line removed. UNOS status 2E for heart transplant    MEDICATIONS  (STANDING):  allopurinol 100 milliGRAM(s) Oral daily  aMIOdarone    Tablet 200 milliGRAM(s) Oral daily  aspirin enteric coated 81 milliGRAM(s) Oral daily  atorvastatin 80 milliGRAM(s) Oral at bedtime  chlorhexidine 2% Cloths 1 Application(s) Topical daily  chlorhexidine 4% Liquid 1 Application(s) Topical <User Schedule>  heparin  Infusion 1200 Unit(s)/Hr (13 mL/Hr) IV Continuous <Continuous>  hydrALAZINE 100 milliGRAM(s) Oral every 8 hours  insulin lispro (ADMELOG) corrective regimen sliding scale   SubCutaneous at bedtime  insulin lispro (ADMELOG) corrective regimen sliding scale   SubCutaneous three times a day before meals  isosorbide   dinitrate Tablet (ISORDIL) 40 milliGRAM(s) Oral three times a day  lidocaine   4% Patch 1 Patch Transdermal daily  metoprolol succinate ER 25 milliGRAM(s) Oral daily  milrinone Infusion 0.5 MICROgram(s)/kG/Min (11 mL/Hr) IV Continuous <Continuous>  pantoprazole    Tablet 40 milliGRAM(s) Oral before breakfast  polyethylene glycol 3350 17 Gram(s) Oral daily  senna 1 Tablet(s) Oral at bedtime    MEDICATIONS  (PRN):  acetaminophen     Tablet .. 650 milliGRAM(s) Oral every 6 hours PRN Temp greater or equal to 38C (100.4F), Mild Pain (1 - 3)  bisacodyl Suppository 10 milliGRAM(s) Rectal daily PRN Constipation  simethicone 80 milliGRAM(s) Chew every 6 hours PRN Gas      Objective:  ICU Vital Signs Last 24 Hrs  T(C): 37.4 (2022 03:00), Max: 37.9 (10 Gus 2022 19:00)  T(F): 99.3 (2022 03:00), Max: 100.3 (10 Gus 2022 19:00)  HR: 80 (2022 07:00) (80 - 112)  BP: 92/53 (10 Gus 2022 10:00) (92/53 - 92/53)  BP(mean): 66 (10 Gus 2022 10:00) (66 - 66)  RR: 21 (2022 07:00) (17 - 32)  SpO2: 91% (2022 07:00) (91% - 96%)      06-10 @ 07:01  -  - @ 07:00  --------------------------------------------------------  IN: 1835.8 mL / OUT: 2250 mL / NET: -414.2 mL    Daily Weight in k.9 (2022 05:00)    PHYSICAL EXAM:  GENERAL: No acute distress  HEAD:  Atraumatic, Normocephalic  EYES: EOMI, PERRLA, conjunctiva and sclera clear  NECK: Supple, no lymphadenopathy, no JVD  CHEST/LUNG: CTAB; No wheezes, rales, or rhonchi  HEART: Regular rate and rhythm. Normal S1/S2  ABDOMEN: Soft, non-tender, non-distended; normal bowel sounds  EXTREMITIES:  2+ peripheral pulses b/l, No clubbing, cyanosis, or edema. IABP site soft no bleeding or hematoma  NEUROLOGY: A&O x 3, no focal deficits, motor and sensation intact      LABS:             13.6   13.42 )-----------( 218      ( 2022 04:28 )             40.7     06-11    132<L>  |  99  |  16  ----------------------------<  167<H>  4.4   |  20<L>  |  1.10    Ca    9.3      2022 04:28  Phos  2.3     06-11  Mg     1.8     06-11    TPro  6.7  /  Alb  3.2<L>  /  TBili  0.4  /  DBili  x   /  AST  27  /  ALT  30  /  AlkPhos  67  -11    LIVER FUNCTIONS - ( 2022 04:28 )  Alb: 3.2 g/dL / Pro: 6.7 g/dL / ALK PHOS: 67 U/L / ALT: 30 U/L / AST: 27 U/L / GGT: x           PT/INR - ( 2022 04:28 )   PT: 13.7 sec;   INR: 1.19 ratio    PTT - ( 2022 04:28 )  PTT:42.6 sec        ====================ASSESSMENT ==============  67 y/o male w/ PMHx HTN, HLD, type 2 DM, chronic HFrEF (EF 25-30% by Echo), complete heart block s/p PPM (in , upgraded to BiV-ICD in 2016), CAD (s/p recent BERNABE mid LAD at [facility] on 2022), and stage II CKD (baseline Cr ~1.3) presented with near syncope to OSH found to have 41 episodes of VT on device, s/p unsuccessful VT ablation and transferred to [facility] for management of cardiogenic shock and advanced therapy eval.     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 RV enlargement with decreased RV systolic function.  - [facility] : patent mLAD stent with slow flow, D1 with 40-50% stenosis  - RHC : RA 12, PA 70/40 (50), PCWP 22, Milana CO/CI 2.3/1.3, MAP 83 with SVR 2469, PVR 12 PERSAUD  - : RA 10, PA 76/35 (49), PCWP 34, PA sat 57% with Milana CO/CI 3.1/1.6, MAP 71 with SVR 1574, PVR 4.8  - 6/6 RHC: CVP 17, RV 75/4, PA 80/36, PCW 22, CI 1.88. IABP placed and pt transferred to CICU.     # VTach   - s/p EPS on , unable to perform ablation as no substrate found and did not induce VT because of severely low EF   - Interrogation of ICD @Dr Wilson's office : back-to-back episodes of VT @ 200+ bpm all terminating with ATP. Since last cath pt has had 41 VT episodes (all falling into VF zone)  - Normal device function. BIV pacing 97%. No programming changes made  - c/w Amiodarone 200 QD  - c/w Toprol 25 daily  - no further VT on inotropic support  - keep K 4-4.5    # CAD   - Chest pain free and compliant with DAPT since last PCI 22 per discussion with interventional at OSH, ok to DC plavix pending cardiac surgery, last dose   - Cardiac cath @[facility] 22: mLAD 90% s/p BERNABE, LCx mild disease, RCA mild disease s/p diagnostic cardiac cath w/ Dr Wagner on 2022   - Cont ASA and Lipitor    ===================HEMATOLOGIC/ONC ===================  ?Secondary Polycythemia Vera   - elevated EPO, hgb in normal range  - Heme following peripherally  - f/u MELINA-2     AC therapy   - Heparin gtt for IABP, low ptt goal    ===================== RENAL =========================  JAGRUTI on CKD II  Admitted w/ Cr 2.01 (baseline Cr ~1.3) now downtrending with inotropic support; 1.10 today  - likely i/s/o cardiogenic shock given RHC findings of CI 1.88  - Avoid nephrotoxic agents, NSAIDs. Renally dose meds.  - monitor strict I/Os and continue current cardiac support     ==================== GASTROINTESTINAL===================  Consistent Carb Diet:  - Tolerating PO diet    Constipation:  - c/w bowel regimen Miralax, Senna, doculax suppository PRN last BM   - s/p colonoscopy 6/3 with 5 polyps removed and biopsied, benign findings- GI recs for 3 yr follow up colo    =======================    ENDOCRINE  =====================  T2DM (A1C 6.2 on admission)  - Cont. ISS, glucose controlled, monitor FS closely     Gout flare, Left knee (previously R)  - continue allopurinol, s/p prednisone    ========================INFECTIOUS DISEASE================  RUE Thrombophlebitis  - RUE US Duplex showing superficial thrombus  - s/p course of ancef    Febrile -8 overnight x1   - UA neg, BCx on  NGTD x2, RVP neg   - follow fever curve, WBC  - lines removed    ======================= LINES/TUBES  =====================  R fem IABP ()  COCO Cordis (-10)

## 2022-06-11 NOTE — PROGRESS NOTE ADULT - CRITICAL CARE ATTENDING COMMENT
Patient seen and examined. Agree with assessment and plan as outlined above.  67 yo M with HTN, DM, severe cardiomyopathy s/p BIVICD with CAD s/p recent BERNABE to mid-LAD with VT storm with cardiogenic shock requiring IABP, low dose milrinone support awaiting heart transplant. Creatinine improved with gentle IV hydration. Keep Is~Os. Bowel regimen. Gout being treated with allupurinol and heat packs. Patient previously treated with prednisone. Heparin gtt for DVT prophylaxis. Appreciate heart failure follow-up.

## 2022-06-11 NOTE — PROGRESS NOTE ADULT - ASSESSMENT
====================ASSESSMENT ==============  67 y/o male w/ PMHx HTN, HLD, type 2 DM, chronic HFrEF (EF 25-30% by Echo), complete heart block s/p PPM (in 2006, upgraded to BiV-ICD in 09/2016), CAD (s/p recent BERNABE mid LAD at [facility] on 04/18/2022), and stage II CKD (baseline Cr ~1.3) presented with near syncope to OSH found to have 41 episodes of VT on device, s/p unsuccessful VT ablation and transferred to [facility] for management of cardiogenic shock and advanced therapy eval.       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 RV enlargement with decreased RV systolic function.  - [facility] 5/23: patent mLAD stent with slow flow, D1 with 40-50% stenosis  - RHC 5/26: RA 12, PA 70/40 (50), PCWP 22, Milana CO/CI 2.3/1.3, MAP 83 with SVR 2469, PVR 12 PERSAUD  - 5/27: RA 10, PA 76/35 (49), PCWP 34, PA sat 57% with Milana CO/CI 3.1/1.6, MAP 71 with SVR 1574, PVR 4.8  - 6/6 RHC: CVP 17, RV 75/4, PA 80/36, PCW 22, CI 1.88. IABP placed and pt transferred to CICU.      VTach   - s/p EPS on 5/24, unable to perform ablation as no substrate found and did not induce VT because of severely low EF   - Interrogation of ICD @Dr Wilson's office 5/20: back-to-back episodes of VT @ 200+ bpm all terminating with ATP. Since last cath pt has had 41 VT episodes (all falling into VF zone)  - Normal device function. BIV pacing 97%. No programming changes made  - c/w Amiodarone 200 QD  - c/w Toprol 25 daily  - no further VT on inotropic support  - keep K 4-4.5    CAD   - Chest pain free and compliant with DAPT since last PCI 4/18/22 per discussion with interventional at OSH, ok to DC plavix pending cardiac surgery, last dose 5/28  - Cardiac cath @[facility] 4/18/22: mLAD 90% s/p BERNABE, LCx mild disease, RCA mild disease s/p diagnostic cardiac cath w/ Dr Wagner on 05/23/2022   - Cont ASA and Lipitor    ===================HEMATOLOGIC/ONC ===================  ?Secondary Polycythemia Vera   - elevated EPO, hgb in normal range  - Heme following peripherally  - f/u MELINA-2     AC therapy   - Heparin gtt for IABP, low ptt goal    ===================== RENAL =========================  JAGRUTI on CKD II  Admitted w/ Cr 2.01 (baseline Cr ~1.3) now downtrending with inotropic support; 1.10 today  - likely i/s/o cardiogenic shock given RHC findings of CI 1.88  - Avoid nephrotoxic agents, NSAIDs. Renally dose meds.  - monitor strict I/Os and continue current cardiac support  - Continue monitoring urine output    ==================== GASTROINTESTINAL===================  Consistent Carb Diet:  - Tolerating PO diet    Constipation:  - c/w bowel regimen Miralax, Senna, doculax suppository PRN last BM 6/9  - s/p colonoscopy 6/3 with 5 polyps removed and biopsied, benign findings- GI recs for 3 yr follow up colo  - Simethicone for gas    =======================    ENDOCRINE  =====================  T2DM (A1C 6.2 on admission)  - Cont. ISS, glucose controlled, monitor FS closely     Gout flare, Left knee (previously R)  - continue allopurinol, s/p prednisone    ========================INFECTIOUS DISEASE================  RUE Thrombophlebitis  - RUE US Duplex showing superficial thrombus  - s/p course of ancef    Febrile 6/7-8 overnight x1   - UA neg, BCx on 6/7 NGTD x2, RVP neg   - follow fever curve, WBC  - lines removed      Patient requires continuous monitoring with bedside rhythm monitoring, pulse ox monitoring, and intermittent blood gas analysis. Care plan discussed with ICU care team. Patient remained critical and at risk for life threatening decompensation.  Patient seen, examined and plan discussed with CCU team during rounds.     I have personally provided _30___ minutes of critical care time excluding time spent on separate procedures, in addition to initial critical care time provided by the CICU Attending,     By signing my name below, I, Bear Reagan, attest that this documentation has been prepared under the direction and in the presence of Lyric Ashby NP  Electronically signed: Mini Kirkpatrick, 06-11-22 @ 21:36    I, Lyric Ashby NP, personally performed the services described in this documentation. all medical record entries made by the scribe were at my direction and in my presence. I have reviewed the chart and agree that the record reflects my personal performance and is accurate and complete  Electronically signed: Lyric Ashby NP

## 2022-06-11 NOTE — PROGRESS NOTE ADULT - SUBJECTIVE AND OBJECTIVE BOX
GOCOOL, LENNOX  MRN-[id number]  Patient is a 66y old  Male who presents with a chief complaint of LVAD/OHT eval (11 Jun 2022 12:16)    HPI:  66M Mixed Ischemic/NICM Cardiomyopathy (EF 25-30% at baseline, s/p BIV-ICD), CAD (medically managed MIs in 2008,2011, Most recent stent in April 2022 to mLAD) presented with multiple near syncope, found to have 41 episodes of VT and admitted initially to cardiac telemetry for further evaluation/management. Ischemic evaluation without new disease and VT ablation without substrate to complete ablation.   Transferred from [facility] for further management and evaluation for LVAD vs OHT.    (26 May 2022 20:31)      Hospital Course:  5/26 Transferred to CCU for further management      24 HOUR EVENTS:    REVIEW OF SYSTEMS:    CONSTITUTIONAL: No weakness, fevers or chills  EYES/ENT: No visual changes;  No vertigo or throat pain   NECK: No pain or stiffness  RESPIRATORY: No cough, wheezing, hemoptysis; No shortness of breath  CARDIOVASCULAR: No chest pain or palpitations  GASTROINTESTINAL: No abdominal or epigastric pain. No nausea, vomiting, or hematemesis; No diarrhea or constipation. No melena or hematochezia.  GENITOURINARY: No dysuria, frequency or hematuria  NEUROLOGICAL: No numbness or weakness  SKIN: No itching, rashes      ICU Vital Signs Last 24 Hrs  T(C): 37.6 (11 Jun 2022 19:00), Max: 37.9 (10 Gus 2022 23:00)  T(F): 99.7 (11 Jun 2022 19:00), Max: 100.2 (10 Gus 2022 23:00)  HR: 80 (11 Jun 2022 21:00) (80 - 112)  BP: --  BP(mean): --  ABP: --  ABP(mean): --  RR: 25 (11 Jun 2022 21:00) (18 - 39)  SpO2: 92% (11 Jun 2022 21:00) (91% - 96%)      CVP(mm Hg): --  CO: --  CI: --  PA: --  PA(mean): --  PA(direct): --  PCWP: --  LA: --  RA: --  SVR: --  SVRI: --  PVR: --  PVRI: --  I&O's Summary    10 Gus 2022 07:01  -  11 Jun 2022 07:00  --------------------------------------------------------  IN: 1835.8 mL / OUT: 2250 mL / NET: -414.2 mL    11 Jun 2022 07:01  -  11 Jun 2022 21:36  --------------------------------------------------------  IN: 604 mL / OUT: 2330 mL / NET: -1726 mL        CAPILLARY BLOOD GLUCOSE    CAPILLARY BLOOD GLUCOSE      POCT Blood Glucose.: 143 mg/dL (11 Jun 2022 21:01)      PHYSICAL EXAM:  GENERAL: No acute distress, well-developed  HEAD:  Atraumatic, Normocephalic  EYES: EOMI, PERRLA, conjunctiva and sclera clear  NECK: Supple, no lymphadenopathy, no JVD  CHEST/LUNG: CTAB; No wheezes, rales, or rhonchi  HEART: Regular rate and rhythm. Normal S1/S2. No murmurs, rubs, or gallops  ABDOMEN: Soft, non-tender, non-distended; normal bowel sounds, no organomegaly  EXTREMITIES:  2+ peripheral pulses b/l, No clubbing, cyanosis, or edema  NEUROLOGY: A&O x 3, no focal deficits  SKIN: No rashes or lesions    ============================I/O===========================   I&O's Detail    10 Gus 2022 07:01  -  11 Jun 2022 07:00  --------------------------------------------------------  IN:    Heparin: 276 mL    Lactated Ringers Bolus: 500 mL    Milrinone: 239.8 mL    Oral Fluid: 820 mL  Total IN: 1835.8 mL    OUT:    Voided (mL): 2250 mL  Total OUT: 2250 mL    Total NET: -414.2 mL      11 Jun 2022 07:01  -  11 Jun 2022 21:36  --------------------------------------------------------  IN:    Heparin: 200.5 mL    Milrinone: 163.5 mL    Oral Fluid: 240 mL  Total IN: 604 mL    OUT:    Voided (mL): 2330 mL  Total OUT: 2330 mL    Total NET: -1726 mL        ============================ LABS =========================                        13.6   13.42 )-----------( 218      ( 11 Jun 2022 04:28 )             40.7     06-11    132<L>  |  99  |  16  ----------------------------<  167<H>  4.4   |  20<L>  |  1.10    Ca    9.3      11 Jun 2022 04:28  Phos  2.3     06-11  Mg     1.8     06-11    TPro  6.7  /  Alb  3.2<L>  /  TBili  0.4  /  DBili  x   /  AST  27  /  ALT  30  /  AlkPhos  67  06-11                LIVER FUNCTIONS - ( 11 Jun 2022 04:28 )  Alb: 3.2 g/dL / Pro: 6.7 g/dL / ALK PHOS: 67 U/L / ALT: 30 U/L / AST: 27 U/L / GGT: x           PT/INR - ( 11 Jun 2022 04:28 )   PT: 13.7 sec;   INR: 1.19 ratio         PTT - ( 11 Jun 2022 17:50 )  PTT:52.5 sec    Lactate, Blood: 1.4 mmol/L (06-11-22 @ 04:28)  Blood Gas Venous - Lactate: 1.0 mmol/L (06-10-22 @ 14:13)  Lactate, Blood: 1.1 mmol/L (06-10-22 @ 04:34)  Blood Gas Venous - Lactate: 1.3 mmol/L (06-09-22 @ 05:41)      ======================Micro/Rad/Cardio=================  Telemtry: Reviewed   EKG: Reviewed  CXR: Reviewed  Culture: Reviewed   Echo:   Cath:   ======================================================  PAST MEDICAL & SURGICAL HISTORY:  AV block      Essential hypertension      Chronic HFrEF (heart failure with reduced ejection fraction)      Chronic kidney disease, unspecified CKD stage      CAD (coronary artery disease)      HLD (hyperlipidemia)      Type 2 diabetes mellitus      Artificial cardiac pacemaker        ====================ASSESSMENT ==============  65 y/o male w/ PMHx HTN, HLD, type 2 DM, chronic HFrEF (EF 25-30% by Echo), complete heart block s/p PPM (in 2006, upgraded to BiV-ICD in 09/2016), CAD (s/p recent BERNABE mid LAD at [facility] on 04/18/2022), and stage II CKD (baseline Cr ~1.3) presented with near syncope to OSH found to have 41 episodes of VT on device, s/p unsuccessful VT ablation and transferred to [facility] for management of cardiogenic shock and advanced therapy eval.                 Plan:  ====================== NEUROLOGY=====================   AAOx3  - No active issues  - Tylenol PRN for fevers/pain    acetaminophen     Tablet .. 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 with low diastolic pulmonary gradient  - bedside nipride study done 5/29 with reduction in PVR to <2       ====================CARDIOVASCULAR==================  Cardiogenic shock  - Maintain IABP 1:1,monitor PTT on heparin gtt  - Maintain Milrinone 0.5mcg/kg/min   - Cont hydral 100 TID and ISDN 40TID for AL Reduction, Assisted mean goal 70-80   - monitor hemodynamics and perfusion indices  - HF following, listed for OHT Status 2E  Relevant studies:  - TTE 5/21: LV 5.2 cm, LVEF 10-15%, LVOT VTI 10 cm, moderate RV dysfunction, mild AI, minimal MR  - TTE on 5/27: EF 15% Severe global LV systolic dysfunction. RV enlargement with decreased RV systolic function.  - LHC 5/23: patent mLAD stent with slow flow, D1 with 40-50% stenosis  - RHC 5/26: RA 12, PA 70/40 (50), PCWP 22, Milana CO/CI 2.3/1.3, MAP 83 with SVR 2469, PVR 12 PERSAUD  - 5/27: RA 10, PA 76/35 (49), PCWP 34, PA sat 57% with Milana CO/CI 3.1/1.6, MAP 71 with SVR 1574, PVR 4.8  - 6/6 RHC: CVP 17, RV 75/4, PA 80/36, PCW 22, CI 1.88. IABP placed and pt transferred to CICU.      VTach   - s/p EPS on 5/24, unable to perform ablation as no substrate found and did not induce VT because of severely low EF   - Interrogation of ICD @Dr Wilson's office 5/20: back-to-back episodes of VT @ 200+ bpm all terminating with ATP. Since last cath pt has had 41 VT episodes (all falling into VF zone)  - Normal device function. BIV pacing 97%. No programming changes made  - c/w Amiodarone 200 QD  - c/w Toprol 25 daily  - no further VT on inotropic support  - keep K 4-4.5    CAD   - Chest pain free and compliant with DAPT since last PCI 4/18/22 per discussion with interventional at OSH, ok to DC plavix pending cardiac surgery, last dose 5/28  - Cardiac cath @[facility] 4/18/22: mLAD 90% s/p BERNABE, LCx mild disease, RCA mild disease s/p diagnostic cardiac cath w/ Dr Wagner on 05/23/2022   - Cont ASA and Lipitor      aspirin enteric coated 81 milliGRAM(s) Oral daily  atorvastatin 80 milliGRAM(s) Oral at bedtime  aMIOdarone    Tablet 200 milliGRAM(s) Oral daily  hydrALAZINE 100 milliGRAM(s) Oral every 8 hours  isosorbide   dinitrate Tablet (ISORDIL) 40 milliGRAM(s) Oral three times a day  metoprolol succinate ER 25 milliGRAM(s) Oral daily  milrinone Infusion 0.5 MICROgram(s)/kG/Min (11 mL/Hr) IV Continuous <Continuous>    ===================HEMATOLOGIC/ONC ===================  ?Secondary Polycythemia Vera   - elevated EPO, hgb in normal range  - Heme following peripherally  - f/u MELNIA-2     AC therapy   - Heparin gtt for IABP, low ptt goal    heparin  Infusion 1200 Unit(s)/Hr (13.5 mL/Hr) IV Continuous <Continuous>    ===================== RENAL =========================  JAGRUTI on CKD II  Admitted w/ Cr 2.01 (baseline Cr ~1.3) now downtrending with inotropic support; 1.10 today  - likely i/s/o cardiogenic shock given RHC findings of CI 1.88  - Avoid nephrotoxic agents, NSAIDs. Renally dose meds.  - monitor strict I/Os and continue current cardiac support       Continue monitoring urine output    ==================== GASTROINTESTINAL===================  Consistent Carb Diet:  - Tolerating PO diet    Constipation:  - c/w bowel regimen Miralax, Senna, doculax suppository PRN last BM 6/9  - s/p colonoscopy 6/3 with 5 polyps removed and biopsied, benign findings- GI recs for 3 yr follow up colo  - Simethicone for gas      bisacodyl Suppository 10 milliGRAM(s) Rectal daily PRN Constipation  pantoprazole    Tablet 40 milliGRAM(s) Oral before breakfast  polyethylene glycol 3350 17 Gram(s) Oral daily  senna 1 Tablet(s) Oral at bedtime  simethicone 80 milliGRAM(s) Chew every 6 hours PRN Gas    =======================    ENDOCRINE  =====================  T2DM (A1C 6.2 on admission)  - Cont. ISS, glucose controlled, monitor FS closely     Gout flare, Left knee (previously R)  - continue allopurinol, s/p prednisone      allopurinol 100 milliGRAM(s) Oral daily  insulin lispro (ADMELOG) corrective regimen sliding scale   SubCutaneous at bedtime  insulin lispro (ADMELOG) corrective regimen sliding scale   SubCutaneous three times a day before meals    ========================INFECTIOUS DISEASE================  RUE Thrombophlebitis  - RUE US Duplex showing superficial thrombus  - s/p course of ancef    Febrile 6/7-8 overnight x1   - UA neg, BCx on 6/7 NGTD x2, RVP neg   - follow fever curve, WBC  - lines removed      Patient requires continuous monitoring with bedside rhythm monitoring, pulse ox monitoring, and intermittent blood gas analysis. Care plan discussed with ICU care team. Patient remained critical and at risk for life threatening decompensation.  Patient seen, examined and plan discussed with CCU team during rounds.     I have personally provided ____ minutes of critical care time excluding time spent on separate procedures, in addition to initial critical care time provided by the CICU Attending,     By signing my name below, I, Bear Reagan, attest that this documentation has been prepared under the direction and in the presence of Lyric Ashby NP  Electronically signed: Mini Kirkpatrick, 06-11-22 @ 21:36    I, Lyric Ashby NP, personally performed the services described in this documentation. all medical record entries made by the scribe were at my direction and in my presence. I have reviewed the chart and agree that the record reflects my personal performance and is accurate and complete  Electronically signed: Lyric Ashby NP           CICU MID-NIGHT NOTE  GOCOOL, LENNOX  MRN-[id number]  Patient is a 66y old  Male who presents with a chief complaint of LVAD/OHT eval (11 Jun 2022 12:16)    HPI: 66M Mixed Ischemic/NICM Cardiomyopathy (EF 25-30% at baseline, s/p BIV-ICD), CAD (medically managed MIs in 2008,2011, Most recent stent in April 2022 to mLAD) presented with multiple near syncope, found to have 41 episodes of VT and admitted initially to cardiac telemetry for further evaluation/management. Ischemic evaluation without new disease and VT ablation without substrate to complete ablation.   Transferred from [facility] for further management and evaluation for LVAD vs OHT.    (26 May 2022 20:31)    Hospital Course:  5/26 Transferred to CCU for further management    24 HOUR EVENTS: No fever   currently on Hep gtt and Milrinoe@0.5    REVIEW OF SYSTEMS  CONSTITUTIONAL: No weakness, fevers or chills  EYES/ENT: No visual changes;  No vertigo or throat pain   NECK: No pain or stiffness  RESPIRATORY: No cough, wheezing, hemoptysis; No shortness of breath  CARDIOVASCULAR: No chest pain or palpitations  GASTROINTESTINAL: No abdominal or epigastric pain. No nausea, vomiting, or hematemesis; No diarrhea or constipation. No melena or hematochezia.  GENITOURINARY: No dysuria, frequency or hematuria  NEUROLOGICAL: No numbness or weakness  SKIN: No itching, rashes    ICU Vital Signs Last 24 Hrs  T(C): 37.6 (Max: 37.9)  HR: 80  (80 - 112)  RR: 25 (18 - 39)  SpO2: 92% (91% - 96%)    I&O's Summary  IN: 604 mL / OUT: 2330 mL / NET: -1726 mL    PHYSICAL EXAM  GENERAL: No acute distress, well-developed  HEAD: Normocephalic  EYES: PERRLA, conjunctiva and sclera clear  NECK: Supple, no lymphadenopathy, no JVD  CHEST/LUNG: CTAB; No wheezes, rales, or rhonchi  HEART: Regular rate and rhythm. Normal S1/S2. No murmurs, rubs, or gallops  ABDOMEN: Soft, non-tender, non-distended; normal bowel sounds, no organomegaly  EXTREMITIES:  2+ peripheral pulses b/l, No clubbing, cyanosis, or edema  NEUROLOGY: A&O x 3, no focal deficits  SKIN: No rashes or lesions    ============================ LABS =========================                        13.6   13.42 )-----------( 218               40.7       132<L>  |  99  |  16  ----------------------------<  167<H>  4.4   |  20<L>  |  1.10    Ca    9.3   Phos  2.3     Mg     1.8   TPro  6.7  /  Alb  3.2<L>  /  TBili  0.4  /  DBili  x   /  AST  27  /  ALT  30  /  AlkPhos  67      LIVER FUNCTIONS - ( 11 Jun 2022 04:28 )  Alb: 3.2 g/dL / Pro: 6.7 g/dL / ALK PHOS: 67 U/L / ALT: 30 U/L / AST: 27 U/L / GGT: x           PT/INR - ( 11 Jun 2022 04:28 )   PT: 13.7 sec;   INR: 1.19 ratio    PTT - ( 11 Jun 2022 17:50 )  PTT:52.5 sec    ======================Micro/Rad/Cardio=================  Telemtry: Reviewed   EKG: Reviewed  CXR: Reviewed  Culture: Reviewed     ======================================================  PAST MEDICAL & SURGICAL HISTORY:  AV block  Essential hypertension  Chronic HFrEF (heart failure with reduced ejection fraction)  Chronic kidney disease, unspecified CKD stage  CAD (coronary artery disease)  HLD (hyperlipidemia)  Type 2 diabetes mellitus  Artificial cardiac pacemaker

## 2022-06-11 NOTE — PROGRESS NOTE ADULT - NS ATTEND AMEND GEN_ALL_CORE FT
Cardiogenic shock well supported with IABP. Continue current management as above. Remains a suitable candidate for OHT and is listed UNOS status 2e

## 2022-06-11 NOTE — PROGRESS NOTE ADULT - PROBLEM SELECTOR PLAN 1
- continue IABP 1:1  - continue milrinone 0.5 mcg/kg/min  - continue HDZN 100 mg TID and ISDN 20 mg TID for afterload reduction, hold for assisted mean < 70   -swan pulled on 6/8/22; remains afebrile

## 2022-06-11 NOTE — PROGRESS NOTE ADULT - SUBJECTIVE AND OBJECTIVE BOX
Subjective:    Medications:  acetaminophen     Tablet .. 650 milliGRAM(s) Oral every 6 hours PRN  allopurinol 100 milliGRAM(s) Oral daily  aMIOdarone    Tablet 200 milliGRAM(s) Oral daily  aspirin enteric coated 81 milliGRAM(s) Oral daily  atorvastatin 80 milliGRAM(s) Oral at bedtime  bisacodyl Suppository 10 milliGRAM(s) Rectal daily PRN  chlorhexidine 2% Cloths 1 Application(s) Topical daily  chlorhexidine 4% Liquid 1 Application(s) Topical <User Schedule>  heparin  Infusion 1200 Unit(s)/Hr IV Continuous <Continuous>  hydrALAZINE 100 milliGRAM(s) Oral every 8 hours  insulin lispro (ADMELOG) corrective regimen sliding scale   SubCutaneous at bedtime  insulin lispro (ADMELOG) corrective regimen sliding scale   SubCutaneous three times a day before meals  isosorbide   dinitrate Tablet (ISORDIL) 40 milliGRAM(s) Oral three times a day  lidocaine   4% Patch 1 Patch Transdermal daily  metoprolol succinate ER 25 milliGRAM(s) Oral daily  milrinone Infusion 0.5 MICROgram(s)/kG/Min IV Continuous <Continuous>  pantoprazole    Tablet 40 milliGRAM(s) Oral before breakfast  polyethylene glycol 3350 17 Gram(s) Oral daily  senna 1 Tablet(s) Oral at bedtime  simethicone 80 milliGRAM(s) Chew every 6 hours PRN      Physical Exam:    Vitals:  Vital Signs Last 24 Hours  T(C): 37.2 (22 @ 11:00), Max: 37.9 (06-10-22 @ 19:00)  HR: 80 (22 @ 12:00) (80 - 112)  BP: --  RR: 23 (22 @ 12:00) (17 - 32)  SpO2: 92% (22 @ 12:00) (91% - 96%)    Weight in k.9 ( @ 05:00)    I&O's Summary    10 Gus 2022 07:01  -  2022 07:00  --------------------------------------------------------  IN: 1835.8 mL / OUT: 2250 mL / NET: -414.2 mL    2022 07:01  -  2022 12:16  --------------------------------------------------------  IN: 119.5 mL / OUT: 1300 mL / NET: -1180.5 mL        Tele:    General: No distress. Comfortable.  HEENT: EOM intact.  Neck: Neck supple. JVP not elevated. No masses  Chest: Clear to auscultation bilaterally  CV: Normal S1 and S2. No murmurs, rub, or gallops. Radial pulses normal.  Abdomen: Soft, non-distended, non-tender  Skin: No rashes or skin breakdown  Neurology: Alert and oriented times three. Sensation intact  Psych: Affect normal    Labs:                        13.6   13.42 )-----------( 218      ( 2022 04:28 )             40.7     06-11    132<L>  |  99  |  16  ----------------------------<  167<H>  4.4   |  20<L>  |  1.10    Ca    9.3      2022 04:28  Phos  2.3     11  Mg     1.8     11    TPro  6.7  /  Alb  3.2<L>  /  TBili  0.4  /  DBili  x   /  AST  27  /  ALT  30  /  AlkPhos  67  06-11    PT/INR - ( 2022 04:28 )   PT: 13.7 sec;   INR: 1.19 ratio         PTT - ( 2022 11:05 )  PTT:46.9 sec        Oxygen Saturation, Mixed: 68.0 ( @ 14:35)      Lactate, Blood: 1.4 mmol/L ( @ 04:28)  Lactate, Blood: 1.1 mmol/L (06-10 @ 04:34)     Subjective:  - NAEO  - Remains Afebrile  - Resting comfortably in Bed  - IABP 1:1 in place    Medications:  acetaminophen     Tablet .. 650 milliGRAM(s) Oral every 6 hours PRN  allopurinol 100 milliGRAM(s) Oral daily  aMIOdarone    Tablet 200 milliGRAM(s) Oral daily  aspirin enteric coated 81 milliGRAM(s) Oral daily  atorvastatin 80 milliGRAM(s) Oral at bedtime  bisacodyl Suppository 10 milliGRAM(s) Rectal daily PRN  chlorhexidine 2% Cloths 1 Application(s) Topical daily  chlorhexidine 4% Liquid 1 Application(s) Topical <User Schedule>  heparin  Infusion 1200 Unit(s)/Hr IV Continuous <Continuous>  hydrALAZINE 100 milliGRAM(s) Oral every 8 hours  insulin lispro (ADMELOG) corrective regimen sliding scale   SubCutaneous at bedtime  insulin lispro (ADMELOG) corrective regimen sliding scale   SubCutaneous three times a day before meals  isosorbide   dinitrate Tablet (ISORDIL) 40 milliGRAM(s) Oral three times a day  lidocaine   4% Patch 1 Patch Transdermal daily  metoprolol succinate ER 25 milliGRAM(s) Oral daily  milrinone Infusion 0.5 MICROgram(s)/kG/Min IV Continuous <Continuous>  pantoprazole    Tablet 40 milliGRAM(s) Oral before breakfast  polyethylene glycol 3350 17 Gram(s) Oral daily  senna 1 Tablet(s) Oral at bedtime  simethicone 80 milliGRAM(s) Chew every 6 hours PRN      Physical Exam:    Vitals:  Vital Signs Last 24 Hours  T(C): 37.2 (22 @ 11:00), Max: 37.9 (06-10-22 @ 19:00)  HR: 80 (22 @ 12:00) (80 - 112)  RR: 23 (22 @ 12:00) (17 - 32)  SpO2: 92% (22 @ 12:00) (91% - 96%)    Weight in k.9 ( @ 05:00)    I&O's Summary    10 Gus 2022 07:01  -  2022 07:00  --------------------------------------------------------  IN: 1835.8 mL / OUT: 2250 mL / NET: -414.2 mL    2022 07:01  -  2022 12:16  --------------------------------------------------------  IN: 119.5 mL / OUT: 1300 mL / NET: -1180.5 mL        Tele: SR 80's    General: No distress. Comfortable.  HEENT: EOM intact.  Neck: Neck supple. JVP not elevated. No masses  Chest: Clear to auscultation bilaterally  CV: Normal S1 and S2. No murmurs, rub, or gallops. Radial pulses normal. warm peripherally  Abdomen: Soft, non-distended, non-tender  Skin: No rashes or skin breakdown,  Neurology: Alert and oriented times three. Sensation intact  Psych: Affect normal      Labs:                        13.6   13.42 )-----------( 218      ( 2022 04:28 )             40.7     06-11    132<L>  |  99  |  16  ----------------------------<  167<H>  4.4   |  20<L>  |  1.10    Ca    9.3      2022 04:28  Phos  2.3       Mg     1.8         TPro  6.7  /  Alb  3.2<L>  /  TBili  0.4  /  DBili  x   /  AST  27  /  ALT  30  /  AlkPhos  67  06-11    PT/INR - ( 2022 04:28 )   PT: 13.7 sec;   INR: 1.19 ratio         PTT - ( 2022 11:05 )  PTT:46.9 sec        Oxygen Saturation, Mixed: 68.0 ( @ 14:35)      Lactate, Blood: 1.4 mmol/L ( @ 04:28)  Lactate, Blood: 1.1 mmol/L (06-10 @ 04:34)

## 2022-06-12 LAB
ALBUMIN SERPL ELPH-MCNC: 3.3 G/DL — SIGNIFICANT CHANGE UP (ref 3.3–5)
ALP SERPL-CCNC: 79 U/L — SIGNIFICANT CHANGE UP (ref 40–120)
ALT FLD-CCNC: 38 U/L — SIGNIFICANT CHANGE UP (ref 10–45)
ANION GAP SERPL CALC-SCNC: 12 MMOL/L — SIGNIFICANT CHANGE UP (ref 5–17)
APTT BLD: 43.6 SEC — HIGH (ref 27.5–35.5)
APTT BLD: 46.1 SEC — HIGH (ref 27.5–35.5)
APTT BLD: 49.5 SEC — HIGH (ref 27.5–35.5)
APTT BLD: 61.4 SEC — HIGH (ref 27.5–35.5)
AST SERPL-CCNC: 33 U/L — SIGNIFICANT CHANGE UP (ref 10–40)
BASOPHILS # BLD AUTO: 0.08 K/UL — SIGNIFICANT CHANGE UP (ref 0–0.2)
BASOPHILS NFR BLD AUTO: 0.6 % — SIGNIFICANT CHANGE UP (ref 0–2)
BILIRUB SERPL-MCNC: 0.6 MG/DL — SIGNIFICANT CHANGE UP (ref 0.2–1.2)
BLD GP AB SCN SERPL QL: NEGATIVE — SIGNIFICANT CHANGE UP
BUN SERPL-MCNC: 18 MG/DL — SIGNIFICANT CHANGE UP (ref 7–23)
CALCIUM SERPL-MCNC: 9.2 MG/DL — SIGNIFICANT CHANGE UP (ref 8.4–10.5)
CHLORIDE SERPL-SCNC: 98 MMOL/L — SIGNIFICANT CHANGE UP (ref 96–108)
CO2 SERPL-SCNC: 21 MMOL/L — LOW (ref 22–31)
CREAT SERPL-MCNC: 1.13 MG/DL — SIGNIFICANT CHANGE UP (ref 0.5–1.3)
EGFR: 73 ML/MIN/1.73M2 — SIGNIFICANT CHANGE UP
EOSINOPHIL # BLD AUTO: 0.4 K/UL — SIGNIFICANT CHANGE UP (ref 0–0.5)
EOSINOPHIL NFR BLD AUTO: 3.1 % — SIGNIFICANT CHANGE UP (ref 0–6)
GLUCOSE BLDC GLUCOMTR-MCNC: 111 MG/DL — HIGH (ref 70–99)
GLUCOSE BLDC GLUCOMTR-MCNC: 113 MG/DL — HIGH (ref 70–99)
GLUCOSE BLDC GLUCOMTR-MCNC: 116 MG/DL — HIGH (ref 70–99)
GLUCOSE BLDC GLUCOMTR-MCNC: 138 MG/DL — HIGH (ref 70–99)
GLUCOSE SERPL-MCNC: 145 MG/DL — HIGH (ref 70–99)
HCT VFR BLD CALC: 40.5 % — SIGNIFICANT CHANGE UP (ref 39–50)
HGB BLD-MCNC: 13.6 G/DL — SIGNIFICANT CHANGE UP (ref 13–17)
IMM GRANULOCYTES NFR BLD AUTO: 0.5 % — SIGNIFICANT CHANGE UP (ref 0–1.5)
INR BLD: 1.2 RATIO — HIGH (ref 0.88–1.16)
LYMPHOCYTES # BLD AUTO: 16.7 % — SIGNIFICANT CHANGE UP (ref 13–44)
LYMPHOCYTES # BLD AUTO: 2.18 K/UL — SIGNIFICANT CHANGE UP (ref 1–3.3)
MAGNESIUM SERPL-MCNC: 1.9 MG/DL — SIGNIFICANT CHANGE UP (ref 1.6–2.6)
MCHC RBC-ENTMCNC: 29.9 PG — SIGNIFICANT CHANGE UP (ref 27–34)
MCHC RBC-ENTMCNC: 33.6 GM/DL — SIGNIFICANT CHANGE UP (ref 32–36)
MCV RBC AUTO: 89 FL — SIGNIFICANT CHANGE UP (ref 80–100)
MONOCYTES # BLD AUTO: 1.4 K/UL — HIGH (ref 0–0.9)
MONOCYTES NFR BLD AUTO: 10.7 % — SIGNIFICANT CHANGE UP (ref 2–14)
NEUTROPHILS # BLD AUTO: 8.9 K/UL — HIGH (ref 1.8–7.4)
NEUTROPHILS NFR BLD AUTO: 68.4 % — SIGNIFICANT CHANGE UP (ref 43–77)
NRBC # BLD: 0 /100 WBCS — SIGNIFICANT CHANGE UP (ref 0–0)
PHOSPHATE SERPL-MCNC: 3.2 MG/DL — SIGNIFICANT CHANGE UP (ref 2.5–4.5)
PLATELET # BLD AUTO: 218 K/UL — SIGNIFICANT CHANGE UP (ref 150–400)
POTASSIUM SERPL-MCNC: 4.3 MMOL/L — SIGNIFICANT CHANGE UP (ref 3.5–5.3)
POTASSIUM SERPL-SCNC: 4.3 MMOL/L — SIGNIFICANT CHANGE UP (ref 3.5–5.3)
PROT SERPL-MCNC: 6.9 G/DL — SIGNIFICANT CHANGE UP (ref 6–8.3)
PROTHROM AB SERPL-ACNC: 14 SEC — HIGH (ref 10.5–13.4)
RBC # BLD: 4.55 M/UL — SIGNIFICANT CHANGE UP (ref 4.2–5.8)
RBC # FLD: 15.5 % — HIGH (ref 10.3–14.5)
RH IG SCN BLD-IMP: POSITIVE — SIGNIFICANT CHANGE UP
SODIUM SERPL-SCNC: 131 MMOL/L — LOW (ref 135–145)
WBC # BLD: 13.03 K/UL — HIGH (ref 3.8–10.5)
WBC # FLD AUTO: 13.03 K/UL — HIGH (ref 3.8–10.5)

## 2022-06-12 PROCEDURE — 99292 CRITICAL CARE ADDL 30 MIN: CPT

## 2022-06-12 PROCEDURE — 99291 CRITICAL CARE FIRST HOUR: CPT

## 2022-06-12 PROCEDURE — 71045 X-RAY EXAM CHEST 1 VIEW: CPT | Mod: 26

## 2022-06-12 RX ORDER — ACETAMINOPHEN 500 MG
1000 TABLET ORAL ONCE
Refills: 0 | Status: COMPLETED | OUTPATIENT
Start: 2022-06-12 | End: 2022-06-12

## 2022-06-12 RX ORDER — MAGNESIUM SULFATE 500 MG/ML
1 VIAL (ML) INJECTION ONCE
Refills: 0 | Status: COMPLETED | OUTPATIENT
Start: 2022-06-12 | End: 2022-06-12

## 2022-06-12 RX ADMIN — AMIODARONE HYDROCHLORIDE 200 MILLIGRAM(S): 400 TABLET ORAL at 05:03

## 2022-06-12 RX ADMIN — Medication 50 MILLIGRAM(S): at 14:09

## 2022-06-12 RX ADMIN — Medication 25 MILLIGRAM(S): at 05:04

## 2022-06-12 RX ADMIN — ISOSORBIDE DINITRATE 40 MILLIGRAM(S): 5 TABLET ORAL at 17:33

## 2022-06-12 RX ADMIN — HEPARIN SODIUM 15 UNIT(S)/HR: 5000 INJECTION INTRAVENOUS; SUBCUTANEOUS at 19:00

## 2022-06-12 RX ADMIN — CHLORHEXIDINE GLUCONATE 1 APPLICATION(S): 213 SOLUTION TOPICAL at 05:05

## 2022-06-12 RX ADMIN — MILRINONE LACTATE 11 MICROGRAM(S)/KG/MIN: 1 INJECTION, SOLUTION INTRAVENOUS at 19:00

## 2022-06-12 RX ADMIN — PANTOPRAZOLE SODIUM 40 MILLIGRAM(S): 20 TABLET, DELAYED RELEASE ORAL at 05:04

## 2022-06-12 RX ADMIN — Medication 100 MILLIGRAM(S): at 12:45

## 2022-06-12 RX ADMIN — Medication 100 MILLIGRAM(S): at 05:04

## 2022-06-12 RX ADMIN — Medication 1000 MILLIGRAM(S): at 06:16

## 2022-06-12 RX ADMIN — Medication 100 MILLIGRAM(S): at 21:04

## 2022-06-12 RX ADMIN — Medication 100 GRAM(S): at 05:04

## 2022-06-12 RX ADMIN — ISOSORBIDE DINITRATE 40 MILLIGRAM(S): 5 TABLET ORAL at 05:03

## 2022-06-12 RX ADMIN — HEPARIN SODIUM 14 UNIT(S)/HR: 5000 INJECTION INTRAVENOUS; SUBCUTANEOUS at 01:05

## 2022-06-12 RX ADMIN — Medication 400 MILLIGRAM(S): at 06:01

## 2022-06-12 RX ADMIN — Medication 81 MILLIGRAM(S): at 12:43

## 2022-06-12 RX ADMIN — ATORVASTATIN CALCIUM 80 MILLIGRAM(S): 80 TABLET, FILM COATED ORAL at 21:04

## 2022-06-12 RX ADMIN — ISOSORBIDE DINITRATE 40 MILLIGRAM(S): 5 TABLET ORAL at 12:44

## 2022-06-12 NOTE — PROGRESS NOTE ADULT - CRITICAL CARE ATTENDING COMMENT
Patient seen and examined. Agree with assessment and plan as outlined above.  66 year-old man with HTN, hypercholesterolemia, DM, severe LV systolic dysfunction s/p BIVICD, recent BERNABE to mid-LAD on 4/18/2022 with VT storm with cardiogenic shock awaiting heart transplant. Patient awaiting transplant on IABP. IABP in stable position on CXR. No signs of heart failure on examination. Gout treated with prednisone/on allopurinol. Tylenol prn. No current infectious issues. On heparin gtt for IABP/DVT prophylaxis.

## 2022-06-12 NOTE — PROGRESS NOTE ADULT - SUBJECTIVE AND OBJECTIVE BOX
Subjective:  - NAEO  - Remains Afebrile  - Resting comfortably in Bed  - IABP 1:1 in place    Medications:  acetaminophen     Tablet .. 650 milliGRAM(s) Oral every 6 hours PRN  allopurinol 100 milliGRAM(s) Oral daily  aMIOdarone    Tablet 200 milliGRAM(s) Oral daily  aspirin enteric coated 81 milliGRAM(s) Oral daily  atorvastatin 80 milliGRAM(s) Oral at bedtime  bisacodyl Suppository 10 milliGRAM(s) Rectal daily PRN  chlorhexidine 2% Cloths 1 Application(s) Topical daily  chlorhexidine 4% Liquid 1 Application(s) Topical <User Schedule>  heparin  Infusion 1200 Unit(s)/Hr IV Continuous <Continuous>  hydrALAZINE 100 milliGRAM(s) Oral every 8 hours  insulin lispro (ADMELOG) corrective regimen sliding scale   SubCutaneous at bedtime  insulin lispro (ADMELOG) corrective regimen sliding scale   SubCutaneous three times a day before meals  isosorbide   dinitrate Tablet (ISORDIL) 40 milliGRAM(s) Oral three times a day  lidocaine   4% Patch 1 Patch Transdermal daily  metoprolol succinate ER 25 milliGRAM(s) Oral daily  milrinone Infusion 0.5 MICROgram(s)/kG/Min IV Continuous <Continuous>  pantoprazole    Tablet 40 milliGRAM(s) Oral before breakfast  polyethylene glycol 3350 17 Gram(s) Oral daily  senna 1 Tablet(s) Oral at bedtime  simethicone 80 milliGRAM(s) Chew every 6 hours PRN      Physical Exam:    Vitals:  Vital Signs Last 24 Hours  T(C): 37.2 (22 @ 11:00), Max: 37.9 (06-10-22 @ 19:00)  HR: 80 (22 @ 12:00) (80 - 112)  RR: 23 (22 @ 12:00) (17 - 32)  SpO2: 92% (22 @ 12:00) (91% - 96%)    Weight in k.9 ( @ 05:00)    I&O's Summary    10 Gus 2022 07:01  -  2022 07:00  --------------------------------------------------------  IN: 1835.8 mL / OUT: 2250 mL / NET: -414.2 mL    2022 07:01  -  2022 12:16  --------------------------------------------------------  IN: 119.5 mL / OUT: 1300 mL / NET: -1180.5 mL        Tele: SR 80's    General: No distress. Comfortable.  HEENT: EOM intact.  Neck: Neck supple. JVP not elevated. No masses  Chest: Clear to auscultation bilaterally  CV: Normal S1 and S2. No murmurs, rub, or gallops. Radial pulses normal. warm peripherally  Abdomen: Soft, non-distended, non-tender  Skin: No rashes or skin breakdown,  Neurology: Alert and oriented times three. Sensation intact  Psych: Affect normal      Labs:                        13.6   13.42 )-----------( 218      ( 2022 04:28 )             40.7     06-11    132<L>  |  99  |  16  ----------------------------<  167<H>  4.4   |  20<L>  |  1.10    Ca    9.3      2022 04:28  Phos  2.3       Mg     1.8         TPro  6.7  /  Alb  3.2<L>  /  TBili  0.4  /  DBili  x   /  AST  27  /  ALT  30  /  AlkPhos  67  06-11    PT/INR - ( 2022 04:28 )   PT: 13.7 sec;   INR: 1.19 ratio         PTT - ( 2022 11:05 )  PTT:46.9 sec    Oxygen Saturation, Mixed: 68.0 ( @ 14:35)    Lactate, Blood: 1.4 mmol/L ( @ 04:28)  Lactate, Blood: 1.1 mmol/L (06-10 @ 04:34)

## 2022-06-12 NOTE — PROGRESS NOTE ADULT - SUBJECTIVE AND OBJECTIVE BOX
CICU MID-NIGHT NOTE  Admission date: 5/26/22  Chief complaint/ Diagnosis: ADHF   HPI: 66M Mixed Ischemic/NICM Cardiomyopathy (EF 25-30% at baseline, s/p BIV-ICD), CAD (medically managed MIs in 2008,2011, Most recent stent in April 2022 to mLAD) presented with multiple near syncope, found to have 41 episodes of VT and admitted initially to cardiac telemetry for further evaluation/management. Ischemic evaluation without new disease and VT ablation without substrate to complete ablation.   Transferred from [facility] for further management and evaluation for LVAD vs OHT.     Interval history: Unevenful overnight    REVIEW OF SYSTEMS  Denies CP, Palpitation, SOB, Dyspnea [ X ] All other systems negative    MEDICATIONS  (STANDING)  allopurinol 100 milliGRAM(s) Oral daily  aMIOdarone    Tablet 200 milliGRAM(s) Oral daily  aspirin enteric coated 81 milliGRAM(s) Oral daily  atorvastatin 80 milliGRAM(s) Oral at bedtime  heparin  Infusion 1200 Unit(s)/Hr (15 mL/Hr) IV Continuous <Continuous>  hydrALAZINE 100 milliGRAM(s) Oral every 8 hours  insulin lispro (ADMELOG) corrective regimen sliding scale   SubCutaneous at bedtime  insulin lispro (ADMELOG) corrective regimen sliding scale   SubCutaneous three times a day before meals  isosorbide   dinitrate Tablet (ISORDIL) 40 milliGRAM(s) Oral three times a day  lidocaine   4% Patch 1 Patch Transdermal daily  metoprolol succinate ER 25 milliGRAM(s) Oral daily  milrinone Infusion 0.5 MICROgram(s)/kG/Min (11 mL/Hr) IV Continuous <Continuous>  pantoprazole    Tablet 40 milliGRAM(s) Oral before breakfast  polyethylene glycol 3350 17 Gram(s) Oral daily  senna 1 Tablet(s) Oral at bedtime    MEDICATIONS  (PRN):  acetaminophen     Tablet .. 650 milliGRAM(s) Oral every 6 hours PRN Temp greater or equal to 38C (100.4F), Mild Pain (1 - 3)  bisacodyl Suppository 10 milliGRAM(s) Rectal daily PRN Constipation  simethicone 80 milliGRAM(s) Chew every 6 hours PRN Gas    PAST MEDICAL & SURGICAL HISTORY:  AV block  Essential hypertension  Chronic HFrEF (heart failure with reduced ejection fraction)  Chronic kidney disease, unspecified CKD stage  CAD (coronary artery disease)  HLD (hyperlipidemia)  Type 2 diabetes mellitus  Artificial cardiac pacemaker    FAMILY HISTORY:  Family history of acute myocardial infarction (Father) 72    Allergy   No Known Allergies    ICU Vital Signs Last 24 Hrs  T(C): 37.3 (Max: 37.4)  HR: 80 (76 - 109)  RR: 20  (18 - 25)  SpO2: 93%  (91% - 97%)    I&O's Summary  IN: 850.8 mL / OUT: 1900 mL / NET: -1049.2 mL    PHYSICAL EXAM  Appearance: Normal, NAD  HEAD:  Normocephalic  EYES:  PERRLA, conjunctiva and sclera clear  NECK: Supple, No JVD  CHEST/LUNG: Clear to auscultation bilaterally; No wheezing  HEART: Normal S1, S2. No murmurs, rubs, or gallops  ABDOMEN: + Bowel sounds, Soft, NT, ND   EXTREMITIES:  2+ Peripheral Pulses, No clubbing, cyanosis, or edema  NEUROLOGY: non-focal, aaox3  SKIN: No rashes or lesions    Interpretation of Telemetry:                        13.6   13.03 )-----------( 218                40.5       131<L>  |  98  |  18  ----------------------------<  145<H>  4.3   |  21<L>  |  1.13<1.10    Ca    9.2  Phos  3.2    Mg     1.9  (repleted)  TPro  6.9  /  Alb  3.3  /  TBili  0.6  /  DBili  x   /  AST  33  /  ALT  38  /  AlkPhos  79

## 2022-06-12 NOTE — PROGRESS NOTE ADULT - SUBJECTIVE AND OBJECTIVE BOX
HPI:  66M Mixed Ischemic/NICM Cardiomyopathy (EF 25-30% at baseline, s/p BIV-ICD), CAD (medically managed MIs in ,, Most recent stent in 2022 to mLAD) presented with multiple near syncope, found to have 41 episodes of VT and admitted initially to cardiac telemetry for further evaluation/management. Ischemic evaluation without new disease and VT ablation without substrate to complete ablation.   Transferred from [facility] for further management and evaluation for LVAD vs OHT.    (26 May 2022 20:31)    INTERVAL HISTORY:  - afebrile overnight, no acute issues    MEDICATIONS  (STANDING):  allopurinol 100 milliGRAM(s) Oral daily  aMIOdarone    Tablet 200 milliGRAM(s) Oral daily  aspirin enteric coated 81 milliGRAM(s) Oral daily  atorvastatin 80 milliGRAM(s) Oral at bedtime  chlorhexidine 2% Cloths 1 Application(s) Topical daily  chlorhexidine 4% Liquid 1 Application(s) Topical <User Schedule>  heparin  Infusion 1200 Unit(s)/Hr (15 mL/Hr) IV Continuous <Continuous>  hydrALAZINE 100 milliGRAM(s) Oral every 8 hours  insulin lispro (ADMELOG) corrective regimen sliding scale   SubCutaneous at bedtime  insulin lispro (ADMELOG) corrective regimen sliding scale   SubCutaneous three times a day before meals  isosorbide   dinitrate Tablet (ISORDIL) 40 milliGRAM(s) Oral three times a day  lidocaine   4% Patch 1 Patch Transdermal daily  metoprolol succinate ER 25 milliGRAM(s) Oral daily  milrinone Infusion 0.5 MICROgram(s)/kG/Min (11 mL/Hr) IV Continuous <Continuous>  pantoprazole    Tablet 40 milliGRAM(s) Oral before breakfast  polyethylene glycol 3350 17 Gram(s) Oral daily  senna 1 Tablet(s) Oral at bedtime    MEDICATIONS  (PRN):  acetaminophen     Tablet .. 650 milliGRAM(s) Oral every 6 hours PRN Temp greater or equal to 38C (100.4F), Mild Pain (1 - 3)  bisacodyl Suppository 10 milliGRAM(s) Rectal daily PRN Constipation  simethicone 80 milliGRAM(s) Chew every 6 hours PRN Gas      Objective:  ICU Vital Signs Last 24 Hrs  T(C): 37.4 (2022 23:00), Max: 37.6 (2022 19:00)  T(F): 99.4 (2022 23:00), Max: 99.7 (2022 19:00)  HR: 109 (2022 06:00) (76 - 109)  RR: 25 (2022 06:00) (18 - 39)  SpO2: 94% (2022 06:00) (91% - 97%)      11 @ 07:01  -  - @ 07:00  --------------------------------------------------------  IN: 1266.6 mL / OUT: 2905 mL / NET: -1638.4 mL       Daily Weight in k.9 (2022 06:00)      PHYSICAL EXAM:  GENERAL: No acute distress  HEAD:  Atraumatic, Normocephalic  EYES: EOMI, PERRLA, conjunctiva and sclera clear  NECK: Supple, no lymphadenopathy, no JVD  CHEST/LUNG: CTAB; No wheezes, rales, or rhonchi  HEART: Regular rate and rhythm. Normal S1/S2  ABDOMEN: Soft, non-tender, non-distended; normal bowel sounds  EXTREMITIES:  2+ peripheral pulses b/l, No clubbing, cyanosis, or edema. IABP site soft no bleeding or hematoma  NEUROLOGY: A&O x 3, no focal deficits, motor and sensation intact      LABS:             13.6   13.03 )-----------( 218      ( 2022 04:14 )             40.5     06-12    131<L>  |  98  |  18  ----------------------------<  145<H>  4.3   |  21<L>  |  1.13    Ca    9.2      2022 04:14  Phos  3.2     06-12  Mg     1.9     06-12    TPro  6.9  /  Alb  3.3  /  TBili  0.6  /  DBili  x   /  AST  33  /  ALT  38  /  AlkPhos  79  06-12    LIVER FUNCTIONS - ( 2022 04:14 )  Alb: 3.3 g/dL / Pro: 6.9 g/dL / ALK PHOS: 79 U/L / ALT: 38 U/L / AST: 33 U/L / GGT: x           PT/INR - ( 2022 06:47 )   PT: 14.0 sec;   INR: 1.20 ratio    PTT - ( 2022 06:47 )  PTT:46.1 sec        ====================ASSESSMENT ==============  65 y/o male w/ PMHx HTN, HLD, type 2 DM, chronic HFrEF (EF 25-30% by Echo), complete heart block s/p PPM (in , upgraded to BiV-ICD in 2016), CAD (s/p recent BERNABE mid LAD at [facility] on 2022), and stage II CKD (baseline Cr ~1.3) presented with near syncope to OSH found to have 41 episodes of VT on device, s/p unsuccessful VT ablation and transferred to [facility] for management of cardiogenic shock and advanced therapy eval.     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 RV enlargement with decreased RV systolic function.  - C : patent mLAD stent with slow flow, D1 with 40-50% stenosis  - RHC : RA 12, PA 70/40 (50), PCWP 22, Milana CO/CI 2.3/1.3, MAP 83 with SVR 2469, PVR 12 PERSAUD  - : RA 10, PA 76/35 (49), PCWP 34, PA sat 57% with Milana CO/CI 3.1/1.6, MAP 71 with SVR 1574, PVR 4.8  - 6/6 RHC: CVP 17, RV 75/4, PA 80/36, PCW 22, CI 1.88. IABP placed and pt transferred to CICU.     # VTach   - s/p EPS on , unable to perform ablation as no substrate found and did not induce VT because of severely low EF   - Interrogation of ICD @Dr Wilson's office : back-to-back episodes of VT @ 200+ bpm all terminating with ATP. Since last cath pt has had 41 VT episodes (all falling into VF zone)  - Normal device function. BIV pacing 97%. No programming changes made  - c/w Amiodarone 200 QD  - c/w Toprol 25 daily  - no further VT on inotropic support  - keep K 4-4.5    # CAD   - Chest pain free and compliant with DAPT since last PCI 22 per discussion with interventional at OSH, ok to DC plavix pending cardiac surgery, last dose   - Cardiac cath @[facility] 22: mLAD 90% s/p BERNABE, LCx mild disease, RCA mild disease s/p diagnostic cardiac cath w/ Dr Wagner on 2022   - Cont ASA and Lipitor    ===================HEMATOLOGIC/ONC ===================  ?Secondary Polycythemia Vera   - elevated EPO, hgb in normal range  - Heme following peripherally  - f/u MELINA-2     AC therapy   - Heparin gtt for IABP, low ptt goal    ===================== RENAL =========================  JAGRUTI on CKD II  Admitted w/ Cr 2.01 (baseline Cr ~1.3) now downtrending with inotropic support; 1.10 today  - likely i/s/o cardiogenic shock given RHC findings of CI 1.88  - Avoid nephrotoxic agents, NSAIDs. Renally dose meds.  - monitor strict I/Os and continue current cardiac support     ==================== GASTROINTESTINAL===================  Consistent Carb Diet:  - Tolerating PO diet    Constipation:  - c/w bowel regimen Miralax, Senna, doculax suppository PRN last BM   - s/p colonoscopy 6/3 with 5 polyps removed and biopsied, benign findings- GI recs for 3 yr follow up colo    =======================    ENDOCRINE  =====================  T2DM (A1C 6.2 on admission)  - Cont. ISS, glucose controlled, monitor FS closely     Gout flare, Left knee (previously R)  - continue allopurinol, s/p prednisone    ========================INFECTIOUS DISEASE================  RUE Thrombophlebitis  - RUE US Duplex showing superficial thrombus  - s/p course of ancef    Febrile -8 overnight x1   - UA neg, BCx on  NGTD x2, RVP neg   - follow fever curve, WBC  - lines removed    ======================= LINES/TUBES  =====================  R fem IABP ()  RIJ Cordis (-10)   HPI:  66M Mixed Ischemic/NICM Cardiomyopathy (EF 25-30% at baseline, s/p BIV-ICD), CAD (medically managed MIs in ,, Most recent stent in 2022 to mLAD) presented with multiple near syncope, found to have 41 episodes of VT and admitted initially to cardiac telemetry for further evaluation/management. Ischemic evaluation without new disease and VT ablation without substrate to complete ablation.   Transferred from [facility] for further management and evaluation for LVAD vs OHT.    (26 May 2022 20:31)    INTERVAL HISTORY:  - afebrile overnight, no acute issues    MEDICATIONS  (STANDING):  allopurinol 100 milliGRAM(s) Oral daily  aMIOdarone    Tablet 200 milliGRAM(s) Oral daily  aspirin enteric coated 81 milliGRAM(s) Oral daily  atorvastatin 80 milliGRAM(s) Oral at bedtime  chlorhexidine 2% Cloths 1 Application(s) Topical daily  chlorhexidine 4% Liquid 1 Application(s) Topical <User Schedule>  heparin  Infusion 1200 Unit(s)/Hr (15 mL/Hr) IV Continuous <Continuous>  hydrALAZINE 100 milliGRAM(s) Oral every 8 hours  insulin lispro (ADMELOG) corrective regimen sliding scale   SubCutaneous at bedtime  insulin lispro (ADMELOG) corrective regimen sliding scale   SubCutaneous three times a day before meals  isosorbide   dinitrate Tablet (ISORDIL) 40 milliGRAM(s) Oral three times a day  lidocaine   4% Patch 1 Patch Transdermal daily  metoprolol succinate ER 25 milliGRAM(s) Oral daily  milrinone Infusion 0.5 MICROgram(s)/kG/Min (11 mL/Hr) IV Continuous <Continuous>  pantoprazole    Tablet 40 milliGRAM(s) Oral before breakfast  polyethylene glycol 3350 17 Gram(s) Oral daily  senna 1 Tablet(s) Oral at bedtime    MEDICATIONS  (PRN):  acetaminophen     Tablet .. 650 milliGRAM(s) Oral every 6 hours PRN Temp greater or equal to 38C (100.4F), Mild Pain (1 - 3)  bisacodyl Suppository 10 milliGRAM(s) Rectal daily PRN Constipation  simethicone 80 milliGRAM(s) Chew every 6 hours PRN Gas      Objective:  ICU Vital Signs Last 24 Hrs  T(C): 37.4 (2022 23:00), Max: 37.6 (2022 19:00)  T(F): 99.4 (2022 23:00), Max: 99.7 (2022 19:00)  HR: 109 (2022 06:00) (76 - 109)  RR: 25 (2022 06:00) (18 - 39)  SpO2: 94% (2022 06:00) (91% - 97%)      11 @ 07:01  -  - @ 07:00  --------------------------------------------------------  IN: 1266.6 mL / OUT: 2905 mL / NET: -1638.4 mL       Daily Weight in k.9 (2022 06:00)      PHYSICAL EXAM:  GENERAL: No acute distress  HEAD:  Atraumatic, Normocephalic  EYES: EOMI, PERRLA, conjunctiva and sclera clear  NECK: Supple, no lymphadenopathy, no JVD  CHEST/LUNG: CTAB; No wheezes, rales, or rhonchi  HEART: Regular rate and rhythm. Normal S1/S2  ABDOMEN: Soft, non-tender, non-distended; normal bowel sounds  EXTREMITIES:  2+ peripheral pulses b/l, No clubbing, cyanosis, or edema. IABP site soft no bleeding or hematoma  NEUROLOGY: A&O x 3, no focal deficits, motor and sensation intact      LABS:             13.6   13.03 )-----------( 218      ( 2022 04:14 )             40.5     06-12    131<L>  |  98  |  18  ----------------------------<  145<H>  4.3   |  21<L>  |  1.13    Ca    9.2      2022 04:14  Phos  3.2     06-12  Mg     1.9     06-12    TPro  6.9  /  Alb  3.3  /  TBili  0.6  /  DBili  x   /  AST  33  /  ALT  38  /  AlkPhos  79  06-12    LIVER FUNCTIONS - ( 2022 04:14 )  Alb: 3.3 g/dL / Pro: 6.9 g/dL / ALK PHOS: 79 U/L / ALT: 38 U/L / AST: 33 U/L / GGT: x           PT/INR - ( 2022 06:47 )   PT: 14.0 sec;   INR: 1.20 ratio    PTT - ( 2022 06:47 )  PTT:46.1 sec        ====================ASSESSMENT ==============  67 y/o male w/ PMHx HTN, HLD, type 2 DM, chronic HFrEF (EF 25-30% by Echo), complete heart block s/p PPM (in , upgraded to BiV-ICD in 2016), CAD (s/p recent BERNABE mid LAD at [facility] on 2022), and stage II CKD (baseline Cr ~1.3) presented with near syncope to OSH found to have 41 episodes of VT on device, s/p unsuccessful VT ablation and transferred to [facility] for management of cardiogenic shock and advanced therapy eval.     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 F/up HF recs    ====================CARDIOVASCULAR==================  # Cardiogenic shock  - Maintain IABP 1:1,monitor PTT on heparin gtt  - Maintain Milrinone 0.5mcg/kg/min   - Cont hydral 100 TID and ISDN 40TID for AL Reduction, Assisted mean goal 70-80   - monitor hemodynamics and perfusion indices  - HF following, listed for OHT Status 2E  Relevant studies:  - TTE : LV 5.2 cm, LVEF 10-15%, LVOT VTI 10 cm, moderate RV dysfunction, mild AI, minimal MR  - TTE on : EF 15% Severe global LV systolic dysfunction. RV enlargement with decreased RV systolic function.  - LHC : patent mLAD stent with slow flow, D1 with 40-50% stenosis  - RHC : RA 12, PA 70/40 (50), PCWP 22, Milana CO/CI 2.3/1.3, MAP 83 with SVR 2469, PVR 12 PERSAUD  - : RA 10, PA 76/35 (49), PCWP 34, PA sat 57% with Milana CO/CI 3.1/1.6, MAP 71 with SVR 1574, PVR 4.8  - 6/6 RHC: CVP 17, RV 75/4, PA 80/36, PCW 22, CI 1.88. IABP placed and pt transferred to CICU.     # VTach   - s/p EPS on , unable to perform ablation as no substrate found and did not induce VT because of severely low EF   - Interrogation of ICD @Dr Wilson's office : back-to-back episodes of VT @ 200+ bpm all terminating with ATP. Since last cath pt has had 41 VT episodes (all falling into VF zone)  - Normal device function. BIV pacing 97%. No programming changes made  - c/w Amiodarone 200 QD  - c/w Toprol 25 daily  - no further VT on inotropic support  - keep K 4-4.5    #CAD   - Chest pain free and compliant with DAPT since last PCI 22 per discussion with interventional at OSH, ok to DC plavix pending cardiac surgery, last dose   - Cardiac cath @[facility] 22: mLAD 90% s/p BERNABE, LCx mild disease, RCA mild disease s/p diagnostic cardiac cath w/ Dr Wagner on 2022   - Cont ASA and Lipitor    ===================HEMATOLOGIC/ONC ===================  ?Secondary Polycythemia Vera   - elevated EPO, hgb in normal range  - Heme following peripherally  - f/u MELINA-2     AC therapy   - Heparin gtt for IABP, low ptt goal    ===================== RENAL =========================  JAGRUTI on CKD II  Admitted w/ Cr 2.01 (baseline Cr ~1.3) now downtrending with inotropic support; 1.10 today  - likely i/s/o cardiogenic shock given RHC findings of CI 1.88  - Avoid nephrotoxic agents, NSAIDs. Renally dose meds.  - monitor strict I/Os and continue current cardiac support     ==================== GASTROINTESTINAL===================  Consistent Carb Diet:  - Tolerating PO diet    Constipation:  - c/w bowel regimen Miralax, Senna, doculax suppository PRN last BM   - s/p colonoscopy 6/3 with 5 polyps removed and biopsied, benign findings- GI recs for 3 yr follow up colo    =======================    ENDOCRINE  =====================  T2DM (A1C 6.2 on admission)  - Cont. ISS, glucose controlled, monitor FS closely     Gout flare, Left knee (previously R)  - continue allopurinol, s/p prednisone    ========================INFECTIOUS DISEASE================  RUE Thrombophlebitis  - RUE US Duplex showing superficial thrombus  - s/p course of ancef    Febrile -8 overnight x1   - UA neg, BCx on  NGTD x2, RVP neg   - follow fever curve, WBC  - lines removed    ======================= LINES/TUBES  =====================  R fem IABP ()  COCO Cordis (-10)   HPI:  66M Mixed Ischemic/NICM Cardiomyopathy (EF 25-30% at baseline, s/p BIV-ICD), CAD (medically managed MIs in ,, Most recent stent in 2022 to mLAD) presented with multiple near syncope, found to have 41 episodes of VT and admitted initially to cardiac telemetry for further evaluation/management. Ischemic evaluation without new disease and VT ablation without substrate to complete ablation.   Transferred from [facility] for further management and evaluation for LVAD vs OHT.    (26 May 2022 20:31)    INTERVAL HISTORY:  - afebrile overnight, no acute issues    MEDICATIONS  (STANDING):  allopurinol 100 milliGRAM(s) Oral daily  aMIOdarone    Tablet 200 milliGRAM(s) Oral daily  aspirin enteric coated 81 milliGRAM(s) Oral daily  atorvastatin 80 milliGRAM(s) Oral at bedtime  chlorhexidine 2% Cloths 1 Application(s) Topical daily  chlorhexidine 4% Liquid 1 Application(s) Topical <User Schedule>  heparin  Infusion 1200 Unit(s)/Hr (15 mL/Hr) IV Continuous <Continuous>  hydrALAZINE 100 milliGRAM(s) Oral every 8 hours  insulin lispro (ADMELOG) corrective regimen sliding scale   SubCutaneous at bedtime  insulin lispro (ADMELOG) corrective regimen sliding scale   SubCutaneous three times a day before meals  isosorbide   dinitrate Tablet (ISORDIL) 40 milliGRAM(s) Oral three times a day  lidocaine   4% Patch 1 Patch Transdermal daily  metoprolol succinate ER 25 milliGRAM(s) Oral daily  milrinone Infusion 0.5 MICROgram(s)/kG/Min (11 mL/Hr) IV Continuous <Continuous>  pantoprazole    Tablet 40 milliGRAM(s) Oral before breakfast  polyethylene glycol 3350 17 Gram(s) Oral daily  senna 1 Tablet(s) Oral at bedtime    MEDICATIONS  (PRN):  acetaminophen     Tablet .. 650 milliGRAM(s) Oral every 6 hours PRN Temp greater or equal to 38C (100.4F), Mild Pain (1 - 3)  bisacodyl Suppository 10 milliGRAM(s) Rectal daily PRN Constipation  simethicone 80 milliGRAM(s) Chew every 6 hours PRN Gas      Objective:  ICU Vital Signs Last 24 Hrs  T(C): 37.4 (2022 23:00), Max: 37.6 (2022 19:00)  T(F): 99.4 (2022 23:00), Max: 99.7 (2022 19:00)  HR: 109 (2022 06:00) (76 - 109)  RR: 25 (2022 06:00) (18 - 39)  SpO2: 94% (2022 06:00) (91% - 97%)      11 @ 07:01  -  - @ 07:00  --------------------------------------------------------  IN: 1266.6 mL / OUT: 2905 mL / NET: -1638.4 mL       Daily Weight in k.9 (2022 06:00)      PHYSICAL EXAM:  GENERAL: No acute distress  HEAD:  Atraumatic, Normocephalic  EYES: EOMI, PERRLA, conjunctiva and sclera clear  NECK: Supple, no lymphadenopathy, no JVD  CHEST/LUNG: CTAB; No wheezes, rales, or rhonchi  HEART: Regular rate and rhythm. Normal S1/S2  ABDOMEN: Soft, non-tender, non-distended; normal bowel sounds  EXTREMITIES:  2+ peripheral pulses b/l, No clubbing, cyanosis, or edema. IABP site soft no bleeding or hematoma  NEUROLOGY: A&O x 3, no focal deficits, motor and sensation intact      LABS:             13.6   13.03 )-----------( 218      ( 2022 04:14 )             40.5     06-12    131<L>  |  98  |  18  ----------------------------<  145<H>  4.3   |  21<L>  |  1.13    Ca    9.2      2022 04:14  Phos  3.2     06-12  Mg     1.9     06-12    TPro  6.9  /  Alb  3.3  /  TBili  0.6  /  DBili  x   /  AST  33  /  ALT  38  /  AlkPhos  79  06-12    LIVER FUNCTIONS - ( 2022 04:14 )  Alb: 3.3 g/dL / Pro: 6.9 g/dL / ALK PHOS: 79 U/L / ALT: 38 U/L / AST: 33 U/L / GGT: x           PT/INR - ( 2022 06:47 )   PT: 14.0 sec;   INR: 1.20 ratio    PTT - ( 2022 06:47 )  PTT:46.1 sec        ====================ASSESSMENT ==============  65 y/o male w/ PMHx HTN, HLD, type 2 DM, chronic HFrEF (EF 25-30% by Echo), complete heart block s/p PPM (in , upgraded to BiV-ICD in 2016), CAD (s/p recent BERNABE mid LAD at [facility] on 2022), and stage II CKD (baseline Cr ~1.3) presented with near syncope to OSH found to have 41 episodes of VT on device, s/p unsuccessful VT ablation and transferred to [facility] for management of cardiogenic shock and advanced therapy eval.     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 F/up HF recs    ====================CARDIOVASCULAR==================  # Cardiogenic shock  - Maintain IABP 1:1,monitor PTT on heparin gtt  - Maintain Milrinone 0.5mcg/kg/min   - Cont hydral 100 TID and ISDN 40TID for AL Reduction, Assisted mean goal 70-80   - monitor hemodynamics and perfusion indices  - HF following, listed for OHT Status 2E  Relevant studies:  - TTE : LV 5.2 cm, LVEF 10-15%, LVOT VTI 10 cm, moderate RV dysfunction, mild AI, minimal MR  - TTE on : EF 15% Severe global LV systolic dysfunction. RV enlargement with decreased RV systolic function.  - LHC : patent mLAD stent with slow flow, D1 with 40-50% stenosis  - RHC : RA 12, PA 70/40 (50), PCWP 22, Milana CO/CI 2.3/1.3, MAP 83 with SVR 2469, PVR 12 PERSAUD  - : RA 10, PA 76/35 (49), PCWP 34, PA sat 57% with Milana CO/CI 3.1/1.6, MAP 71 with SVR 1574, PVR 4.8  - 6/6 RHC: CVP 17, RV 75/4, PA 80/36, PCW 22, CI 1.88. IABP placed and pt transferred to CICU.     # VTach   - s/p EPS on , unable to perform ablation as no substrate found and did not induce VT because of severely low EF   - Interrogation of ICD @Dr Wilson's office : back-to-back episodes of VT @ 200+ bpm all terminating with ATP. Since last cath pt has had 41 VT episodes (all falling into VF zone)  - Normal device function. BIV pacing 97%. No programming changes made  - c/w Amiodarone 200 QD  - c/w Toprol 25 daily  - no further VT on inotropic support  - keep K 4-4.5    #CAD   - Chest pain free and compliant with DAPT since last PCI 22 per discussion with interventional at OSH, ok to DC plavix pending cardiac surgery, last dose   - Cardiac cath @[facility] 22: mLAD 90% s/p BERNABE, LCx mild disease, RCA mild disease s/p diagnostic cardiac cath w/ Dr Wagner on 2022   - Cont ASA and Lipitor    ===================HEMATOLOGIC/ONC ===================  ?Secondary Polycythemia Vera   - elevated EPO, hgb in normal range  - Heme following peripherally    AC therapy   - Heparin gtt for IABP, low ptt goal    ===================== RENAL =========================  JAGRUTI on CKD II  Admitted w/ Cr 2.01 (baseline Cr ~1.3) now downtrending with inotropic support  - likely i/s/o cardiogenic shock given RHC findings of CI 1.88  - Avoid nephrotoxic agents, NSAIDs. Renally dose meds.  - monitor strict I/Os and continue current cardiac support   - Keep K 4-4.5, Mg . 2    ==================== GASTROINTESTINAL===================  Consistent Carb Diet:  - Tolerating PO diet    Constipation:  - c/w bowel regimen Miralax, Senna, doculax suppository PRN last BM   - s/p colonoscopy 6/3 with 5 polyps removed and biopsied, benign findings- GI recs for 3 yr follow up colo    =======================    ENDOCRINE  =====================  T2DM (A1C 6.2 on admission)  - Cont. ISS, glucose controlled, monitor FS closely     Gout flare, Left knee (previously R)  - continue allopurinol, s/p prednisone    ========================INFECTIOUS DISEASE================  RUE Thrombophlebitis  - RUE US Duplex showing superficial thrombus  - s/p course of ancef    Febrile -8 overnight x1   - UA neg, BCx on  NGTD x2, RVP neg   - follow fever curve, WBC  - lines removed    ======================= LINES/TUBES  =====================  R fem IABP ()  COCO Cordis (-10)

## 2022-06-12 NOTE — PROGRESS NOTE ADULT - PROBLEM SELECTOR PLAN 3
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 Nipride study 5/29 with reduction in PVR to < 2 though still with elevated PA pressures   - Improved with MCS unloading  - Given that he has previously had elevated PA pressures which where reversible with Nipride, would favor re-insertion of PA catheter to re-assess PA pressures prior to transplant. Will discuss with HF transplant team covering this week.

## 2022-06-12 NOTE — PROGRESS NOTE ADULT - PROBLEM SELECTOR PLAN 1
- continue IABP 1:1  - continue milrinone 0.5 mcg/kg/min  - continue HDZN 100 mg TID and ISDN 40 mg TID for afterload reduction, hold for assisted mean < 70   -swan pulled on 6/8/22; remains afebrile. May re-inserted this week to assess PA pressure before transplant

## 2022-06-12 NOTE — PROGRESS NOTE ADULT - NS ATTEND AMEND GEN_ALL_CORE FT
Cardiogenic shock well supported with IABP. CXR with mildly worsened congestion. Will discuss at [facility] tomorrow if we should replace PAC to reassess PA pressures and LV unloading with consideration to either add nipride or transition to axillary 5.5 for unloading.

## 2022-06-12 NOTE — PROGRESS NOTE ADULT - ASSESSMENT
====================ASSESSMENT ==============  67 y/o male w/ PMHx HTN, HLD, type 2 DM, chronic HFrEF (EF 25-30% by Echo), complete heart block s/p PPM (in 2006, upgraded to BiV-ICD in 09/2016), CAD (s/p recent BERNABE mid LAD at [facility] on 04/18/2022), and stage II CKD (baseline Cr ~1.3) presented with near syncope to OSH found to have 41 episodes of VT on device, s/p unsuccessful VT ablation and transferred to [facility] for management of cardiogenic shock and advanced therapy eval.     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 RV enlargement with decreased RV systolic function.  - [facility] 5/23: patent mLAD stent with slow flow, D1 with 40-50% stenosis  - RHC 5/26: RA 12, PA 70/40 (50), PCWP 22, Milana CO/CI 2.3/1.3, MAP 83 with SVR 2469, PVR 12 PERSAUD  - 5/27: RA 10, PA 76/35 (49), PCWP 34, PA sat 57% with Milana CO/CI 3.1/1.6, MAP 71 with SVR 1574, PVR 4.8  - 6/6 RHC: CVP 17, RV 75/4, PA 80/36, PCW 22, CI 1.88. IABP placed and pt transferred to CICU.      VTach   - s/p EPS on 5/24, unable to perform ablation as no substrate found and did not induce VT because of severely low EF   - Interrogation of ICD @Dr Wilson's office 5/20: back-to-back episodes of VT @ 200+ bpm all terminating with ATP. Since last cath pt has had 41 VT episodes (all falling into VF zone)  - Normal device function. BIV pacing 97%. No programming changes made  - c/w Amiodarone 200 QD  - c/w Toprol 25 daily  - No further VT on inotropic support  - keep K 4-4.5    CAD   - Chest pain free and compliant with DAPT since last PCI 4/18/22 per discussion with interventional at OSH, ok to DC plavix pending cardiac surgery, last dose 5/28  - Cardiac cath @[facility] 4/18/22: mLAD 90% s/p BERNABE, LCx mild disease, RCA mild disease s/p diagnostic cardiac cath w/ Dr Wagner on 05/23/2022   - Cont ASA and Lipitor    ===================HEMATOLOGIC/ONC ===================  ?Secondary Polycythemia Vera   - elevated EPO, hgb in normal range  - Heme following peripherally  - f/u MELINA-2     AC therapy   - Heparin gtt for IABP, low ptt goal    ===================== RENAL =========================  JAGRUTI on CKD II  Admitted w/ Cr 2.01 (baseline Cr ~1.3) now downtrending with inotropic support; 1.10 today  - likely i/s/o cardiogenic shock given RHC findings of CI 1.88  - Avoid nephrotoxic agents, NSAIDs. Renally dose meds.  - monitor strict I/Os and continue current cardiac support  - Continue monitoring urine output    ==================== GASTROINTESTINAL===================  Consistent Carb Diet:  - Tolerating PO diet    Constipation:  - Cont. bowel regimen Miralax, Senna, doculax suppository PRN   - s/p colonoscopy 6/3 with 5 polyps removed and biopsied, benign findings- GI recs for 3 yr follow up colo  - Simethicone for gas    =======================    ENDOCRINE  =====================  T2DM (A1C 6.2 on admission)  - Cont. ISS, glucose controlled, monitor FS closely     Gout flare, Left knee (previously R)  - continue allopurinol, s/p prednisone    ========================INFECTIOUS DISEASE================  RUE Thrombophlebitis  - RUE US Duplex showing superficial thrombus  - s/p course of ancef    Febrile 6/7-8 overnight x1   - UA neg, BCx on 6/7 NGTD x2, RVP neg   - follow fever curve, WBC    Patient requires continuous monitoring with bedside rhythm monitoring, pulse ox monitoring, and intermittent blood gas analysis. Care plan discussed with ICU care team. Patient remained critical and at risk for life threatening decompensation. Patient seen, examined and plan discussed with CCU team during rounds.   I have personally provided _30___ minutes of critical care time excluding time spent on separate procedures, in addition to initial critical care time provided by the CICU Attending, Dr.Laighold COLLINS, Lyric Ashby NP, personally performed the services described in this documentation. all medical record entries made by the scribe were at my direction and in my presence. I have reviewed the chart and agree that the record reflects my personal performance and is accurate and complete  Electronically signed: Lyric Ashby NP

## 2022-06-12 NOTE — PROGRESS NOTE ADULT - ASSESSMENT
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 5/20 with near syncope in setting of worsening HF symptoms and found to have 41 episodes of VT, many terminating with ATP. LHC did not reveal new obstructive CAD and he underwent EPS which did not reveal endocardial substrate amenable for ablation. RHC revealed severely depressed cardiac output and he was transferred to [facility] 5/26 for advanced therapies evaluation.    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 He improved significantly with sequential uptitration of inotropes and increased pacing rate, but on 6/6 he had worsening JAGRUTI. He is s/p RHC which revealed severely elevated filling pressures, severe cpcPH, and CI of 1.9 on milrinone 0.5. IABP was placed and his renal function/PAPs have improved. He is MCS dependent and was inadequately supported with high dose inotropes, so was listed UNOS status 2e for OHT, awaiting suitable donor. However, was made status 7 as he became febrile a few days ago. He has been afebrile since and blood cultures from 6/7 with NGTD x 48 hours and his leukocytosis has not worsened. Discussed with transplant ID and since bld cx negative x48hrs he has been re-listed UNOS status 2e.     Review of studies  TTE 5/21: LV 5.2 cm, LVEF 10-15%, LVOT VTI 10 cm, moderate RV dysfunction, mild AI, minimal MR  EKG: a-BiV paced  [facility] 5/23: patent mLAD stent with slow flow, D1 with 40-50% stenosis, mild disease otherwise  RHC 5/26: RA 12, PA 70/40 (50), PCWP 22, Milana CO/CI 2.3/1.3, MAP 83 with SVR 2469, PVR 12 PERSAUD    Hemodynamics  5/27 (milrinone 0.25): RA 10, PA 76/35 (49), PCWP 34, PA sat 57% with Milana CO/CI 3.1/1.6, MAP 71 with SVR 1574, PVR 4.8  5/28 (on milrinone 0.375): RA 7, PA 63/31, PCWP 26, PA sat 72.8% with Milana CO/CI 4.9/2.5 (CI later dropped to 1.6 in the afternoon), MAP 70 with SVR 1039, PVR 2.9  5/29 (on milrinone 0.5): RA 8, PA 75/32 (50), PCWP 28, PA sat 74% with Milana CO/CI 5.0/2.6, MAP 77 with SVR 1200, PVR 4.4  5/29 (on milrinone 0.5 and nipride to 3 mcg/kg/min): RA 6, PA 59/31 (40), PCWP 30, PA sat 79% with Milana CO/CI 7.0/3.6, BP 97/57 (MAP 70) with , PVR 1.4  6/6 RHC (mil 0.5): RA 11 (v13), RV 75/17, PA 80/36/52, PCWP 24 (v29), PA sat 59.5%, CO/CI (F) 3.58/1.88  6/7 (mil 0.5, IABP 1:1): CVP 5, PA 66/32/45, PA sat 65.7%, CO/CI (F) 4.0/2.1, SVR 2000  6/8 (mil 0.5, IABP 1:1): CVP 1, PA 46/19/28, PA sat 72.7%, CO/CI (F) 5.6/2.9, SVR 1257  6/8 PM (mil 0.5, IABP 1:1): CVP 4, PA 68/25/38, PA sat 68%, CO/CI (f) 5/2.6, SVR 1326

## 2022-06-12 NOTE — PROGRESS NOTE ADULT - PROBLEM SELECTOR PLAN 5
- hx of VT s/p unsuccessful VT ablation  - continue on amio 200 mg PO QD  - since being admitted to [facility] has not had any episodes of VT, currently tolerating high dose milrinone.  -paced at 80 on telemetry with few PVCs

## 2022-06-13 LAB
ALBUMIN SERPL ELPH-MCNC: 3.1 G/DL — LOW (ref 3.3–5)
ALP SERPL-CCNC: 85 U/L — SIGNIFICANT CHANGE UP (ref 40–120)
ALT FLD-CCNC: 52 U/L — HIGH (ref 10–45)
ANION GAP SERPL CALC-SCNC: 11 MMOL/L — SIGNIFICANT CHANGE UP (ref 5–17)
APTT BLD: 58.8 SEC — HIGH (ref 27.5–35.5)
AST SERPL-CCNC: 40 U/L — SIGNIFICANT CHANGE UP (ref 10–40)
BASOPHILS # BLD AUTO: 0.08 K/UL — SIGNIFICANT CHANGE UP (ref 0–0.2)
BASOPHILS NFR BLD AUTO: 0.7 % — SIGNIFICANT CHANGE UP (ref 0–2)
BILIRUB SERPL-MCNC: 0.6 MG/DL — SIGNIFICANT CHANGE UP (ref 0.2–1.2)
BUN SERPL-MCNC: 23 MG/DL — SIGNIFICANT CHANGE UP (ref 7–23)
CALCIUM SERPL-MCNC: 9.3 MG/DL — SIGNIFICANT CHANGE UP (ref 8.4–10.5)
CHLORIDE SERPL-SCNC: 98 MMOL/L — SIGNIFICANT CHANGE UP (ref 96–108)
CO2 SERPL-SCNC: 21 MMOL/L — LOW (ref 22–31)
CREAT SERPL-MCNC: 1.14 MG/DL — SIGNIFICANT CHANGE UP (ref 0.5–1.3)
CULTURE RESULTS: SIGNIFICANT CHANGE UP
CULTURE RESULTS: SIGNIFICANT CHANGE UP
EGFR: 72 ML/MIN/1.73M2 — SIGNIFICANT CHANGE UP
EOSINOPHIL # BLD AUTO: 0.65 K/UL — HIGH (ref 0–0.5)
EOSINOPHIL NFR BLD AUTO: 5.6 % — SIGNIFICANT CHANGE UP (ref 0–6)
GLUCOSE BLDC GLUCOMTR-MCNC: 127 MG/DL — HIGH (ref 70–99)
GLUCOSE BLDC GLUCOMTR-MCNC: 134 MG/DL — HIGH (ref 70–99)
GLUCOSE BLDC GLUCOMTR-MCNC: 183 MG/DL — HIGH (ref 70–99)
GLUCOSE SERPL-MCNC: 136 MG/DL — HIGH (ref 70–99)
HCT VFR BLD CALC: 38.6 % — LOW (ref 39–50)
HGB BLD-MCNC: 12.9 G/DL — LOW (ref 13–17)
IMM GRANULOCYTES NFR BLD AUTO: 0.5 % — SIGNIFICANT CHANGE UP (ref 0–1.5)
INR BLD: 1.21 RATIO — HIGH (ref 0.88–1.16)
LACTATE BLDV-MCNC: 1 MMOL/L — SIGNIFICANT CHANGE UP (ref 0.7–2)
LACTATE SERPL-SCNC: 1 MMOL/L — SIGNIFICANT CHANGE UP (ref 0.7–2)
LYMPHOCYTES # BLD AUTO: 18.3 % — SIGNIFICANT CHANGE UP (ref 13–44)
LYMPHOCYTES # BLD AUTO: 2.12 K/UL — SIGNIFICANT CHANGE UP (ref 1–3.3)
MAGNESIUM SERPL-MCNC: 1.9 MG/DL — SIGNIFICANT CHANGE UP (ref 1.6–2.6)
MCHC RBC-ENTMCNC: 29.8 PG — SIGNIFICANT CHANGE UP (ref 27–34)
MCHC RBC-ENTMCNC: 33.4 GM/DL — SIGNIFICANT CHANGE UP (ref 32–36)
MCV RBC AUTO: 89.1 FL — SIGNIFICANT CHANGE UP (ref 80–100)
MONOCYTES # BLD AUTO: 0.99 K/UL — HIGH (ref 0–0.9)
MONOCYTES NFR BLD AUTO: 8.6 % — SIGNIFICANT CHANGE UP (ref 2–14)
NEUTROPHILS # BLD AUTO: 7.67 K/UL — HIGH (ref 1.8–7.4)
NEUTROPHILS NFR BLD AUTO: 66.3 % — SIGNIFICANT CHANGE UP (ref 43–77)
NRBC # BLD: 0 /100 WBCS — SIGNIFICANT CHANGE UP (ref 0–0)
PHOSPHATE SERPL-MCNC: 3.6 MG/DL — SIGNIFICANT CHANGE UP (ref 2.5–4.5)
PLATELET # BLD AUTO: 222 K/UL — SIGNIFICANT CHANGE UP (ref 150–400)
POTASSIUM SERPL-MCNC: 4.5 MMOL/L — SIGNIFICANT CHANGE UP (ref 3.5–5.3)
POTASSIUM SERPL-SCNC: 4.5 MMOL/L — SIGNIFICANT CHANGE UP (ref 3.5–5.3)
PROT SERPL-MCNC: 6.8 G/DL — SIGNIFICANT CHANGE UP (ref 6–8.3)
PROTHROM AB SERPL-ACNC: 13.9 SEC — HIGH (ref 10.5–13.4)
RBC # BLD: 4.33 M/UL — SIGNIFICANT CHANGE UP (ref 4.2–5.8)
RBC # FLD: 15.4 % — HIGH (ref 10.3–14.5)
SODIUM SERPL-SCNC: 130 MMOL/L — LOW (ref 135–145)
SPECIMEN SOURCE: SIGNIFICANT CHANGE UP
SPECIMEN SOURCE: SIGNIFICANT CHANGE UP
WBC # BLD: 11.57 K/UL — HIGH (ref 3.8–10.5)
WBC # FLD AUTO: 11.57 K/UL — HIGH (ref 3.8–10.5)

## 2022-06-13 PROCEDURE — 99291 CRITICAL CARE FIRST HOUR: CPT

## 2022-06-13 PROCEDURE — 71045 X-RAY EXAM CHEST 1 VIEW: CPT | Mod: 26

## 2022-06-13 PROCEDURE — 99292 CRITICAL CARE ADDL 30 MIN: CPT

## 2022-06-13 PROCEDURE — 99232 SBSQ HOSP IP/OBS MODERATE 35: CPT

## 2022-06-13 RX ORDER — SODIUM NITROPRUSSIDE 50 MG/2ML
0.25 INJECTION INTRAVENOUS
Qty: 100 | Refills: 0 | Status: DISCONTINUED | OUTPATIENT
Start: 2022-06-13 | End: 2022-06-21

## 2022-06-13 RX ORDER — SODIUM CHLORIDE 0.65 %
1 AEROSOL, SPRAY (ML) NASAL EVERY 6 HOURS
Refills: 0 | Status: DISCONTINUED | OUTPATIENT
Start: 2022-06-13 | End: 2022-06-21

## 2022-06-13 RX ADMIN — Medication 100 MILLIGRAM(S): at 05:59

## 2022-06-13 RX ADMIN — ISOSORBIDE DINITRATE 40 MILLIGRAM(S): 5 TABLET ORAL at 12:14

## 2022-06-13 RX ADMIN — Medication 25 MILLIGRAM(S): at 06:00

## 2022-06-13 RX ADMIN — ISOSORBIDE DINITRATE 40 MILLIGRAM(S): 5 TABLET ORAL at 21:37

## 2022-06-13 RX ADMIN — Medication 100 MILLIGRAM(S): at 15:41

## 2022-06-13 RX ADMIN — ISOSORBIDE DINITRATE 40 MILLIGRAM(S): 5 TABLET ORAL at 06:00

## 2022-06-13 RX ADMIN — Medication 100 MILLIGRAM(S): at 21:36

## 2022-06-13 RX ADMIN — Medication 1 SPRAY(S): at 23:33

## 2022-06-13 RX ADMIN — CHLORHEXIDINE GLUCONATE 1 APPLICATION(S): 213 SOLUTION TOPICAL at 21:29

## 2022-06-13 RX ADMIN — CHLORHEXIDINE GLUCONATE 1 APPLICATION(S): 213 SOLUTION TOPICAL at 07:15

## 2022-06-13 RX ADMIN — Medication 81 MILLIGRAM(S): at 12:37

## 2022-06-13 RX ADMIN — POLYETHYLENE GLYCOL 3350 17 GRAM(S): 17 POWDER, FOR SOLUTION ORAL at 12:15

## 2022-06-13 RX ADMIN — SODIUM NITROPRUSSIDE 2.73 MICROGRAM(S)/KG/MIN: 50 INJECTION INTRAVENOUS at 17:40

## 2022-06-13 RX ADMIN — AMIODARONE HYDROCHLORIDE 200 MILLIGRAM(S): 400 TABLET ORAL at 05:59

## 2022-06-13 RX ADMIN — Medication 100 MILLIGRAM(S): at 12:14

## 2022-06-13 RX ADMIN — MILRINONE LACTATE 11 MICROGRAM(S)/KG/MIN: 1 INJECTION, SOLUTION INTRAVENOUS at 17:41

## 2022-06-13 RX ADMIN — PANTOPRAZOLE SODIUM 40 MILLIGRAM(S): 20 TABLET, DELAYED RELEASE ORAL at 06:00

## 2022-06-13 NOTE — PROGRESS NOTE ADULT - ASSESSMENT
65 y/o male w/ PMHx HTN, HLD, type 2 DM, chronic HFrEF (EF 25-30% by Echo), complete heart block s/p PPM (in 2006, upgraded to BiV-ICD in 09/2016), CAD (s/p recent BERNABE mid LAD at [facility] on 04/18/2022), and stage II CKD (baseline Cr ~1.3) presented with near syncope to OSH found to have 41 episodes of VT on device, s/p unsuccessful VT ablation and transferred to [facility] for management of cardiogenic shock and advanced therapy eval.     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 RV enlargement with decreased RV systolic function.  - C 5/23: patent mLAD stent with slow flow, D1 with 40-50% stenosis  - RHC 5/26: RA 12, PA 70/40 (50), PCWP 22, Milana CO/CI 2.3/1.3, MAP 83 with SVR 2469, PVR 12 PERSAUD  - 5/27: RA 10, PA 76/35 (49), PCWP 34, PA sat 57% with Milana CO/CI 3.1/1.6, MAP 71 with SVR 1574, PVR 4.8  - 6/6 RHC: CVP 17, RV 75/4, PA 80/36, PCW 22, CI 1.88. IABP placed and pt transferred to CICU.      VTach   - s/p EPS on 5/24, unable to perform ablation as no substrate found and did not induce VT because of severely low EF   - Interrogation of ICD @Dr Wilson's office 5/20: back-to-back episodes of VT @ 200+ bpm all terminating with ATP. Since last cath pt has had 41 VT episodes (all falling into VF zone)  - Normal device function. BIV pacing 97%. No programming changes made  - c/w Amiodarone 200 QD  - c/w Toprol 25 daily  - No further VT on inotropic support  - keep K 4-4.5    CAD   - Chest pain free and compliant with DAPT since last PCI 4/18/22 per discussion with interventional at OSH, ok to DC plavix pending cardiac surgery, last dose 5/28  - Cardiac cath @[facility] 4/18/22: mLAD 90% s/p BERNABE, LCx mild disease, RCA mild disease s/p diagnostic cardiac cath w/ Dr Wagner on 05/23/2022   - Cont ASA and Lipitor    ===================HEMATOLOGIC/ONC ===================  ?Secondary Polycythemia Vera   - elevated EPO, hgb in normal range  - Heme following peripherally  - f/u MELINA-2     AC therapy   - Heparin gtt for IABP, low ptt goal    ===================== RENAL =========================  JAGRUTI on CKD II  Admitted w/ Cr 2.01 (baseline Cr ~1.3) now downtrending with inotropic support; 1.10 today  - likely i/s/o cardiogenic shock given RHC findings of CI 1.88  - Avoid nephrotoxic agents, NSAIDs. Renally dose meds.  - monitor strict I/Os and continue current cardiac support  - Continue monitoring urine output    ==================== GASTROINTESTINAL===================  Consistent Carb Diet:  - Tolerating PO diet    Constipation:  - Cont. bowel regimen Miralax, Senna, doculax suppository PRN   - s/p colonoscopy 6/3 with 5 polyps removed and biopsied, benign findings- GI recs for 3 yr follow up colo  - Simethicone for gas    =======================    ENDOCRINE  =====================  T2DM (A1C 6.2 on admission)  - Cont. ISS, glucose controlled, monitor FS closely     Gout flare, Left knee (previously R)  - continue allopurinol, s/p prednisone    ========================INFECTIOUS DISEASE================  RUE Thrombophlebitis  - RUE US Duplex showing superficial thrombus  - s/p course of ancef    Febrile 6/7-8 overnight x1   - UA neg, BCx on 6/7 NGTD x2, RVP neg   - follow fever curve, WBC   65 y/o male w/ PMHx HTN, HLD, type 2 DM, chronic HFrEF (EF 25-30% by Echo), complete heart block s/p PPM (in 2006, upgraded to BiV-ICD in 09/2016), CAD (s/p recent BERNABE mid LAD at [facility] on 04/18/2022), and stage II CKD (baseline Cr ~1.3) presented with near syncope to OSH found to have 41 episodes of VT on device, s/p unsuccessful VT ablation and transferred to [facility] for management of cardiogenic shock and advanced therapy eval.     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 with low diastolic pulmonary gradient  - bedside nipride study done 5/29 with reduction in PVR to <2     ====================CARDIOVASCULAR==================  Cardiogenic shock  - Maintain IABP 1:1,monitor PTT on heparin gtt  - Maintain Milrinone 0.5mcg/kg/min   - Cont hydral 100 TID and ISDN 40TID for AL Reduction, Assisted mean goal 70-80   - monitor hemodynamics and perfusion indices  - HF following, listed for OHT Status 2E  - 6/13: HF recommends adding nipride   Relevant studies:  - TTE 5/21: LV 5.2 cm, LVEF 10-15%, LVOT VTI 10 cm, moderate RV dysfunction, mild AI, minimal MR  - TTE on 5/27: EF 15% Severe global LV systolic dysfunction. RV enlargement with decreased RV systolic function.  - LHC 5/23: patent mLAD stent with slow flow, D1 with 40-50% stenosis  - RHC 5/26: RA 12, PA 70/40 (50), PCWP 22, Milana CO/CI 2.3/1.3, MAP 83 with SVR 2469, PVR 12 PERSAUD  - 5/27: RA 10, PA 76/35 (49), PCWP 34, PA sat 57% with Milana CO/CI 3.1/1.6, MAP 71 with SVR 1574, PVR 4.8  - 6/6 RHC: CVP 17, RV 75/4, PA 80/36, PCW 22, CI 1.88. IABP placed and pt transferred to CICU.      VTach   - s/p EPS on 5/24, unable to perform ablation as no substrate found and did not induce VT because of severely low EF   - Interrogation of ICD @Dr Wilson's office 5/20: back-to-back episodes of VT @ 200+ bpm all terminating with ATP. Since last cath pt has had 41 VT episodes (all falling into VF zone)  - Normal device function. BIV pacing 97%. No programming changes made  - c/w Amiodarone 200 QD  - c/w Toprol 25 daily  - No further VT on inotropic support  - keep K 4-4.5    CAD   - Chest pain free and compliant with DAPT since last PCI 4/18/22 per discussion with interventional at OSH, ok to DC plavix pending cardiac surgery, last dose 5/28  - Cardiac cath @[facility] 4/18/22: mLAD 90% s/p BERNABE, LCx mild disease, RCA mild disease s/p diagnostic cardiac cath w/ Dr Wagner on 05/23/2022   - Cont ASA, d/c lipitor for elevated LFTs, pending transplant    ===================HEMATOLOGIC/ONC ===================  ?Secondary Polycythemia Vera   - elevated EPO, hgb in normal range  - Heme following peripherally  - f/u MELINA-2     AC therapy   - Heparin gtt for IABP, low ptt goal    ===================== RENAL =========================  JAGRUTI on CKD II  Admitted w/ Cr 2.01 (baseline Cr ~1.3) now downtrending with inotropic support; 1.10 today  - likely i/s/o cardiogenic shock given RHC findings of CI 1.88  - Avoid nephrotoxic agents, NSAIDs. Renally dose meds.  - monitor strict I/Os and continue current cardiac support  - Continue monitoring urine output    ==================== GASTROINTESTINAL===================  Consistent Carb Diet:  - Tolerating PO diet    Constipation:  - Cont. bowel regimen Miralax, Senna, doculax suppository PRN   - s/p colonoscopy 6/3 with 5 polyps removed and biopsied, benign findings- GI recs for 3 yr follow up colo  - Simethicone for gas    =======================    ENDOCRINE  =====================  T2DM (A1C 6.2 on admission)  - Cont. ISS, glucose controlled, monitor FS closely     Gout flare, Left knee (previously R)  - continue allopurinol, s/p prednisone    ========================INFECTIOUS DISEASE================  RUE Thrombophlebitis  - RUE US Duplex showing superficial thrombus  - s/p course of ancef    Febrile 6/7-8 overnight x1   - UA neg, BCx on 6/7 NGTD x2, RVP neg   - follow fever curve, WBC   65 y/o male w/ PMHx HTN, HLD, type 2 DM, chronic HFrEF (EF 25-30% by Echo), complete heart block s/p PPM (in 2006, upgraded to BiV-ICD in 09/2016), CAD (s/p recent BERNABE mid LAD at [facility] on 04/18/2022), and stage II CKD (baseline Cr ~1.3) presented with near syncope to OSH found to have 41 episodes of VT on device, s/p unsuccessful VT ablation and transferred to [facility] for management of cardiogenic shock and advanced therapy eval.     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 with low diastolic pulmonary gradient  - bedside nipride study done 5/29 with reduction in PVR to <2     ====================CARDIOVASCULAR==================  Cardiogenic shock  - Maintain IABP 1:1,monitor PTT on heparin gtt  - Maintain Milrinone 0.5mcg/kg/min   - Cont hydral 100 TID and ISDN 40TID for AL Reduction, Assisted mean goal 70-80   - monitor hemodynamics and perfusion indices  - HF following, listed for OHT Status 2E  - 6/13: HF recommends adding nipride, patient already on isordil  Relevant studies:  - TTE 5/21: LV 5.2 cm, LVEF 10-15%, LVOT VTI 10 cm, moderate RV dysfunction, mild AI, minimal MR  - TTE on 5/27: EF 15% Severe global LV systolic dysfunction. RV enlargement with decreased RV systolic function.  - C 5/23: patent mLAD stent with slow flow, D1 with 40-50% stenosis  - RHC 5/26: RA 12, PA 70/40 (50), PCWP 22, Milana CO/CI 2.3/1.3, MAP 83 with SVR 2469, PVR 12 PERSAUD  - 5/27: RA 10, PA 76/35 (49), PCWP 34, PA sat 57% with Milana CO/CI 3.1/1.6, MAP 71 with SVR 1574, PVR 4.8  - 6/6 RHC: CVP 17, RV 75/4, PA 80/36, PCW 22, CI 1.88. IABP placed and pt transferred to CICU.      VTach   - s/p EPS on 5/24, unable to perform ablation as no substrate found and did not induce VT because of severely low EF   - Interrogation of ICD @Dr Wilson's office 5/20: back-to-back episodes of VT @ 200+ bpm all terminating with ATP. Since last cath pt has had 41 VT episodes (all falling into VF zone)  - Normal device function. BIV pacing 97%. No programming changes made  - c/w Amiodarone 200 QD  - c/w Toprol 25 daily  - No further VT on inotropic support  - keep K 4-4.5    CAD   - Chest pain free and compliant with DAPT since last PCI 4/18/22 per discussion with interventional at OSH, ok to DC plavix pending cardiac surgery, last dose 5/28  - Cardiac cath @[facility] 4/18/22: mLAD 90% s/p BERNABE, LCx mild disease, RCA mild disease s/p diagnostic cardiac cath w/ Dr Wagner on 05/23/2022   - Cont ASA, d/c lipitor for elevated LFTs, pending transplant    ===================HEMATOLOGIC/ONC ===================  ?Secondary Polycythemia Vera   - elevated EPO, hgb in normal range  - Heme following peripherally  - f/u MELINA-2     AC therapy   - Heparin gtt for IABP, low ptt goal    ===================== RENAL =========================  JAGRUTI on CKD II  Admitted w/ Cr 2.01 (baseline Cr ~1.3) now downtrending with inotropic support; 1.10 today  - likely i/s/o cardiogenic shock given RHC findings of CI 1.88  - Avoid nephrotoxic agents, NSAIDs. Renally dose meds.  - monitor strict I/Os and continue current cardiac support  - Continue monitoring urine output    ==================== GASTROINTESTINAL===================  Consistent Carb Diet:  - Tolerating PO diet    Constipation:  - Cont. bowel regimen Miralax, Senna, doculax suppository PRN   - s/p colonoscopy 6/3 with 5 polyps removed and biopsied, benign findings- GI recs for 3 yr follow up colo  - Simethicone for gas    =======================    ENDOCRINE  =====================  T2DM (A1C 6.2 on admission)  - Cont. ISS, glucose controlled, monitor FS closely     Gout flare, Left knee (previously R)  - continue allopurinol, s/p prednisone    ========================INFECTIOUS DISEASE================  RUE Thrombophlebitis  - RUE US Duplex showing superficial thrombus  - s/p course of ancef    Febrile 6/7-8 overnight x1   - UA neg, BCx on 6/7 NGTD x2, RVP neg   - follow fever curve, WBC

## 2022-06-13 NOTE — PROGRESS NOTE ADULT - SUBJECTIVE AND OBJECTIVE BOX
Subjective:    Medications:  acetaminophen     Tablet .. 650 milliGRAM(s) Oral every 6 hours PRN  allopurinol 100 milliGRAM(s) Oral daily  aMIOdarone    Tablet 200 milliGRAM(s) Oral daily  aspirin enteric coated 81 milliGRAM(s) Oral daily  atorvastatin 80 milliGRAM(s) Oral at bedtime  bisacodyl Suppository 10 milliGRAM(s) Rectal daily PRN  chlorhexidine 2% Cloths 1 Application(s) Topical daily  chlorhexidine 4% Liquid 1 Application(s) Topical <User Schedule>  heparin  Infusion 1200 Unit(s)/Hr IV Continuous <Continuous>  hydrALAZINE 100 milliGRAM(s) Oral every 8 hours  insulin lispro (ADMELOG) corrective regimen sliding scale   SubCutaneous at bedtime  insulin lispro (ADMELOG) corrective regimen sliding scale   SubCutaneous three times a day before meals  isosorbide   dinitrate Tablet (ISORDIL) 40 milliGRAM(s) Oral three times a day  lidocaine   4% Patch 1 Patch Transdermal daily  metoprolol succinate ER 25 milliGRAM(s) Oral daily  milrinone Infusion 0.5 MICROgram(s)/kG/Min IV Continuous <Continuous>  pantoprazole    Tablet 40 milliGRAM(s) Oral before breakfast  polyethylene glycol 3350 17 Gram(s) Oral daily  senna 1 Tablet(s) Oral at bedtime  simethicone 80 milliGRAM(s) Chew every 6 hours PRN      Physical Exam:    Vitals:  Vital Signs Last 24 Hours  T(C): 37.2 (22 @ 00:00), Max: 37.3 (22 @ 19:00)  HR: 109 (22 @ 08:00) (80 - 109)  BP: --  RR: 17 (22 @ 08:00) (16 - 27)  SpO2: 93% (22 @ 08:00) (91% - 95%)    Weight in k.9 ( @ 06:00)    I&O's Summary    2022 07:01  -  2022 07:00  --------------------------------------------------------  IN: 1281.6 mL / OUT: 3160 mL / NET: -1878.4 mL    2022 07:01  -  2022 10:23  --------------------------------------------------------  IN: 25.9 mL / OUT: 175 mL / NET: -149.1 mL    Tele:    General: No distress. Comfortable.  HEENT: EOM intact.  Neck: Neck supple. JVP not elevated. No masses  Chest: Clear to auscultation bilaterally  CV: Normal S1 and S2. No murmurs, rub, or gallops. Radial pulses normal.  Abdomen: Soft, non-distended, non-tender  Skin: No rashes or skin breakdown  Neurology: Alert and oriented times three. Sensation intact  Psych: Affect normal    Labs:                        12.9   11.57 )-----------( 222      ( 2022 05:00 )             38.6     06-13    130<L>  |  98  |  23  ----------------------------<  136<H>  4.5   |  21<L>  |  1.14    Ca    9.3      2022 05:00  Phos  3.6     06-13  Mg     1.9     06-13    TPro  6.8  /  Alb  3.1<L>  /  TBili  0.6  /  DBili  x   /  AST  40  /  ALT  52<H>  /  AlkPhos  85  06-13    PT/INR - ( 2022 05:00 )   PT: 13.9 sec;   INR: 1.21 ratio       PTT - ( 2022 05:00 )  PTT:58.8 sec    Lactate, Blood: 1.0 mmol/L ( @ 05:00)  Lactate, Blood: 1.4 mmol/L ( @ 04:28)

## 2022-06-13 NOTE — PROGRESS NOTE ADULT - SUBJECTIVE AND OBJECTIVE BOX
Authored by Sumi Pantoja MD, PGY2  PATIENT:  LENNOX GOCOOL  [id number]    CHIEF COMPLAINT:  Patient is a 66y old  Male who presents with a chief complaint of LVAD/OHT eval (2022 22:31)      INTERVAL HISTORY OVERNIGHT EVENTS:        MEDICATIONS:  MEDICATIONS  (STANDING):  allopurinol 100 milliGRAM(s) Oral daily  aMIOdarone    Tablet 200 milliGRAM(s) Oral daily  aspirin enteric coated 81 milliGRAM(s) Oral daily  atorvastatin 80 milliGRAM(s) Oral at bedtime  chlorhexidine 2% Cloths 1 Application(s) Topical daily  chlorhexidine 4% Liquid 1 Application(s) Topical <User Schedule>  heparin  Infusion 1200 Unit(s)/Hr (15 mL/Hr) IV Continuous <Continuous>  hydrALAZINE 100 milliGRAM(s) Oral every 8 hours  insulin lispro (ADMELOG) corrective regimen sliding scale   SubCutaneous at bedtime  insulin lispro (ADMELOG) corrective regimen sliding scale   SubCutaneous three times a day before meals  isosorbide   dinitrate Tablet (ISORDIL) 40 milliGRAM(s) Oral three times a day  lidocaine   4% Patch 1 Patch Transdermal daily  metoprolol succinate ER 25 milliGRAM(s) Oral daily  milrinone Infusion 0.5 MICROgram(s)/kG/Min (11 mL/Hr) IV Continuous <Continuous>  pantoprazole    Tablet 40 milliGRAM(s) Oral before breakfast  polyethylene glycol 3350 17 Gram(s) Oral daily  senna 1 Tablet(s) Oral at bedtime    MEDICATIONS  (PRN):  acetaminophen     Tablet .. 650 milliGRAM(s) Oral every 6 hours PRN Temp greater or equal to 38C (100.4F), Mild Pain (1 - 3)  bisacodyl Suppository 10 milliGRAM(s) Rectal daily PRN Constipation  simethicone 80 milliGRAM(s) Chew every 6 hours PRN Gas      ALLERGIES:  Allergies    No Known Allergies    Intolerances        OBJECTIVE:  ICU Vital Signs Last 24 Hrs  T(C): 37.2 (2022 00:00), Max: 37.3 (2022 19:00)  T(F): 99 (2022 00:00), Max: 99.1 (2022 19:00)  HR: 80 (2022 07:00) (80 - 103)  BP: --  BP(mean): --  ABP: --  ABP(mean): --  RR: 21 (2022 07:00) (16 - 27)  SpO2: 93% (2022 07:00) (91% - 95%)      Adult Advanced Hemodynamics Last 24 Hrs  CVP(mm Hg): --  CVP(cm H2O): --  CO: --  CI: --  PA: --  PA(mean): --  PCWP: --  SVR: --  SVRI: --  PVR: --  PVRI: --  CAPILLARY BLOOD GLUCOSE      POCT Blood Glucose.: 113 mg/dL (2022 21:03)  POCT Blood Glucose.: 116 mg/dL (2022 17:05)  POCT Blood Glucose.: 111 mg/dL (2022 12:53)  POCT Blood Glucose.: 138 mg/dL (2022 08:17)    CAPILLARY BLOOD GLUCOSE      POCT Blood Glucose.: 113 mg/dL (2022 21:03)    I&O's Summary    2022 07:01  -  2022 07:00  --------------------------------------------------------  IN: 1281.6 mL / OUT: 3160 mL / NET: -1878.4 mL    2022 07:01  -  2022 08:02  --------------------------------------------------------  IN: 25.9 mL / OUT: 175 mL / NET: -149.1 mL      Daily     Daily Weight in k.9 (2022 06:00)    PHYSICAL EXAMINATION:  Appearance: Normal, NAD  HEAD:  Normocephalic  EYES:  PERRLA, conjunctiva and sclera clear  NECK: Supple, No JVD  CHEST/LUNG: Clear to auscultation bilaterally; No wheezing  HEART: Normal S1, S2. No murmurs, rubs, or gallops  ABDOMEN: + Bowel sounds, Soft, NT, ND   EXTREMITIES:  2+ Peripheral Pulses, No clubbing, cyanosis, or edema  NEUROLOGY: non-focal, aaox3  SKIN: No rashes or lesions        LABS:                          12.9   11.57 )-----------( 222      ( 2022 05:00 )             38.6     06-13    130<L>  |  98  |  23  ----------------------------<  136<H>  4.5   |  21<L>  |  1.14    Ca    9.3      2022 05:00  Phos  3.6     06-13  Mg     1.9     06-13    TPro  6.8  /  Alb  3.1<L>  /  TBili  0.6  /  DBili  x   /  AST  40  /  ALT  52<H>  /  AlkPhos  85  06-13    LIVER FUNCTIONS - ( 2022 05:00 )  Alb: 3.1 g/dL / Pro: 6.8 g/dL / ALK PHOS: 85 U/L / ALT: 52 U/L / AST: 40 U/L / GGT: x           PT/INR - ( 2022 05:00 )   PT: 13.9 sec;   INR: 1.21 ratio         PTT - ( 2022 05:00 )  PTT:58.8 sec            TELEMETRY:     EKG:     IMAGING:         Authored by Sumi Pantoja MD, PGY2  PATIENT:  [facility] GOCOOL  [id number]    CHIEF COMPLAINT:  Patient is a 66y old  Male who presents with a chief complaint of LVAD/OHT eval (2022 22:31)      INTERVAL HISTORY OVERNIGHT EVENTS: DEON overnight. Patient endorses good appetite, denies SOB, chest pain, weakness. Patient had a stool overnight.         MEDICATIONS:  MEDICATIONS  (STANDING):  allopurinol 100 milliGRAM(s) Oral daily  aMIOdarone    Tablet 200 milliGRAM(s) Oral daily  aspirin enteric coated 81 milliGRAM(s) Oral daily  atorvastatin 80 milliGRAM(s) Oral at bedtime  chlorhexidine 2% Cloths 1 Application(s) Topical daily  chlorhexidine 4% Liquid 1 Application(s) Topical <User Schedule>  heparin  Infusion 1200 Unit(s)/Hr (15 mL/Hr) IV Continuous <Continuous>  hydrALAZINE 100 milliGRAM(s) Oral every 8 hours  insulin lispro (ADMELOG) corrective regimen sliding scale   SubCutaneous at bedtime  insulin lispro (ADMELOG) corrective regimen sliding scale   SubCutaneous three times a day before meals  isosorbide   dinitrate Tablet (ISORDIL) 40 milliGRAM(s) Oral three times a day  lidocaine   4% Patch 1 Patch Transdermal daily  metoprolol succinate ER 25 milliGRAM(s) Oral daily  milrinone Infusion 0.5 MICROgram(s)/kG/Min (11 mL/Hr) IV Continuous <Continuous>  pantoprazole    Tablet 40 milliGRAM(s) Oral before breakfast  polyethylene glycol 3350 17 Gram(s) Oral daily  senna 1 Tablet(s) Oral at bedtime    MEDICATIONS  (PRN):  acetaminophen     Tablet .. 650 milliGRAM(s) Oral every 6 hours PRN Temp greater or equal to 38C (100.4F), Mild Pain (1 - 3)  bisacodyl Suppository 10 milliGRAM(s) Rectal daily PRN Constipation  simethicone 80 milliGRAM(s) Chew every 6 hours PRN Gas      ALLERGIES:  Allergies    No Known Allergies    Intolerances        OBJECTIVE:  ICU Vital Signs Last 24 Hrs  T(C): 37.2 (2022 00:00), Max: 37.3 (2022 19:00)  T(F): 99 (2022 00:00), Max: 99.1 (2022 19:00)  HR: 80 (2022 07:00) (80 - 103)  BP: --  BP(mean): --  ABP: --  ABP(mean): --  RR: 21 (2022 07:00) (16 - 27)  SpO2: 93% (2022 07:00) (91% - 95%)      Adult Advanced Hemodynamics Last 24 Hrs  CVP(mm Hg): --  CVP(cm H2O): --  CO: --  CI: --  PA: --  PA(mean): --  PCWP: --  SVR: --  SVRI: --  PVR: --  PVRI: --  CAPILLARY BLOOD GLUCOSE      POCT Blood Glucose.: 113 mg/dL (2022 21:03)  POCT Blood Glucose.: 116 mg/dL (2022 17:05)  POCT Blood Glucose.: 111 mg/dL (2022 12:53)  POCT Blood Glucose.: 138 mg/dL (2022 08:17)    CAPILLARY BLOOD GLUCOSE      POCT Blood Glucose.: 113 mg/dL (2022 21:03)    I&O's Summary    2022 07:01  -  2022 07:00  --------------------------------------------------------  IN: 1281.6 mL / OUT: 3160 mL / NET: -1878.4 mL    2022 07:01  -  2022 08:02  --------------------------------------------------------  IN: 25.9 mL / OUT: 175 mL / NET: -149.1 mL      Daily     Daily Weight in k.9 (2022 06:00)    PHYSICAL EXAMINATION:  Appearance: Normal, NAD  HEAD:  Normocephalic  EYES:  PERRLA, conjunctiva and sclera clear  NECK: Supple, No JVD  CHEST/LUNG: Clear to auscultation bilaterally; No wheezing  HEART: Normal S1, S2. No murmurs, rubs, or gallops  ABDOMEN: + Bowel sounds, Soft, NT, ND   EXTREMITIES:  2+ Peripheral Pulses, No clubbing, cyanosis, or edema  NEUROLOGY: non-focal, aaox3  SKIN: No rashes or lesions        LABS:                          12.9   11.57 )-----------( 222      ( 2022 05:00 )             38.6     06-13    130<L>  |  98  |  23  ----------------------------<  136<H>  4.5   |  21<L>  |  1.14    Ca    9.3      2022 05:00  Phos  3.6     06-13  Mg     1.9     06-13    TPro  6.8  /  Alb  3.1<L>  /  TBili  0.6  /  DBili  x   /  AST  40  /  ALT  52<H>  /  AlkPhos  85  06-13    LIVER FUNCTIONS - ( 2022 05:00 )  Alb: 3.1 g/dL / Pro: 6.8 g/dL / ALK PHOS: 85 U/L / ALT: 52 U/L / AST: 40 U/L / GGT: x           PT/INR - ( 2022 05:00 )   PT: 13.9 sec;   INR: 1.21 ratio         PTT - ( 2022 05:00 )  PTT:58.8 sec            TELEMETRY:     EKG:     IMAGING:

## 2022-06-13 NOTE — PROGRESS NOTE ADULT - ASSESSMENT
67 y/o [place] M PMHx mixed Ischemic/NICM cardiomyopathy (EF 25-30% at baseline, s/p BIV-ICD) and CAD, initially presented to [facility] with near syncope, found to intermittent episodes of VT and admitted to cardiac telemetry for further evaluation/management. S/p unsuccessful VT ablation. Now transferred to [facility] CCU for advanced therapies evaluation. S/p [place] placement in CCU, course c/b fever on 5/28, s/p subsequent [place] removal. Blood cultures no growth, no further fevers.     Last fever 100.6 (5/28)  Blood Cultures (5/28) no growth  Urinalysis unremarkable  CT Chest (5/28) mild pulmonary edema    Immunizations:  COVID Pfizer x2 (10/2021)    Pre-Transplant Labs:  HAV IgG+  HBsAb-, HBsAg-, HBcAb-   HCV Ab-  HSV 1/2 IgG +/-  EBV negative  CMV IgG+  Toxoplasma IgG-  VZV IgG+  Measles IgG+  Mumps IgG+   Rubella IgG-  Quantiferon Gold negative  HIV Test negative  Syphilis Screen negative  Strongyloides Ab negative    Impression:  pre- heart transplant listing    Recs:  -mild leukocytosis and low grade fever  - non toxic appearing- notes a little fatigue after receiving an influenza vaccine  CXR=NAD, no dysuria or cough,   phlebitis RUE without tenderness at this point and improved  - [place] introducer removed      Health maintenance:  - s/p first dose of hep B vaccine on 6/3/22  - s/p Prevnar 20 vaccine dose   --s/p influenza vaccine       Reggie Escalante MD  Can be called via Teams  After 5pm/weekends [phone]         67 y/o [place] M PMHx mixed Ischemic/NICM cardiomyopathy (EF 25-30% at baseline, s/p BIV-ICD) and CAD, initially presented to [facility] with near syncope, found to intermittent episodes of VT and admitted to cardiac telemetry for further evaluation/management. S/p unsuccessful VT ablation. Now transferred to [facility] CCU for advanced therapies evaluation. S/p [place] placement in CCU, course c/b fever on 5/28, s/p subsequent [place] removal. Blood cultures no growth, no further fevers.     Last fever 100.6 (5/28)  Blood Cultures (5/28) no growth  Urinalysis unremarkable  CT Chest (5/28) mild pulmonary edema    Immunizations:  COVID Pfizer x2 (10/2021)    Pre-Transplant Labs:  HAV IgG+  HBsAb-, HBsAg-, HBcAb-   HCV Ab-  HSV 1/2 IgG +/-  EBV negative  CMV IgG+  Toxoplasma IgG-  VZV IgG+  Measles IgG+  Mumps IgG+   Rubella IgG-  Quantiferon Gold negative  HIV Test negative  Syphilis Screen negative  Strongyloides Ab negative    Impression:  pre- heart transplant listing    Recs:  -mild leukocytosis and low grade fever  - non toxic appearing- notes a little fatigue after receiving an influenza vaccine  CXR=NAD, no dysuria or cough,   phlebitis RUE without tenderness at this point and improved  - [place] introducer removed      Health maintenance:  - s/p first dose of hep B vaccine on 6/3/22  - s/p Prevnar 20 vaccine dose   --s/p influenza vaccine     no contra indications to transplant listing from an ID perspective      Reggie Escalante MD  Can be called via Teams  After 5pm/weekends [phone]

## 2022-06-13 NOTE — PROGRESS NOTE ADULT - SUBJECTIVE AND OBJECTIVE BOX
GOCOOL, LENNOX  MRN-[id number]  Patient is a 66y old  Male who presents with a chief complaint of LVAD/OHT eval (13 Jun 2022 17:44)    HPI:  66M Mixed Ischemic/NICM Cardiomyopathy (EF 25-30% at baseline, s/p BIV-ICD), CAD (medically managed MIs in 2008,2011, Most recent stent in April 2022 to mLAD) presented with multiple near syncope, found to have 41 episodes of VT and admitted initially to cardiac telemetry for further evaluation/management. Ischemic evaluation without new disease and VT ablation without substrate to complete ablation.   Transferred from [facility] for further management and evaluation for LVAD vs OHT.    (26 May 2022 20:31)      Hospital Course:  5/26 Transferred to CCU for further management    24 HOUR EVENTS:    REVIEW OF SYSTEMS:    CONSTITUTIONAL: No weakness, fevers or chills  EYES/ENT: No visual changes;  No vertigo or throat pain   NECK: No pain or stiffness  RESPIRATORY: No cough, wheezing, hemoptysis; No shortness of breath  CARDIOVASCULAR: No chest pain or palpitations  GASTROINTESTINAL: No abdominal or epigastric pain. No nausea, vomiting, or hematemesis; No diarrhea or constipation. No melena or hematochezia.  GENITOURINARY: No dysuria, frequency or hematuria  NEUROLOGICAL: No numbness or weakness  SKIN: No itching, rashes      ICU Vital Signs Last 24 Hrs  T(C): 36.9 (13 Jun 2022 15:00), Max: 37.2 (13 Jun 2022 00:00)  T(F): 98.4 (13 Jun 2022 15:00), Max: 99 (13 Jun 2022 00:00)  HR: 76 (13 Jun 2022 22:00) (76 - 109)  BP: --  BP(mean): --  ABP: --  ABP(mean): --  RR: 17 (13 Jun 2022 22:00) (16 - 32)  SpO2: 94% (13 Jun 2022 22:00) (89% - 95%)    I&O's Summary    12 Jun 2022 07:01  -  13 Jun 2022 07:00  --------------------------------------------------------  IN: 1281.6 mL / OUT: 3160 mL / NET: -1878.4 mL    13 Jun 2022 07:01  -  13 Jun 2022 22:38  --------------------------------------------------------  IN: 965.5 mL / OUT: 1575 mL / NET: -609.5 mL        CAPILLARY BLOOD GLUCOSE  POCT Blood Glucose.: 183 mg/dL (13 Jun 2022 21:34)      PHYSICAL EXAM:  GENERAL: No acute distress, well-developed  HEAD:  Atraumatic, Normocephalic  EYES: EOMI, PERRLA, conjunctiva and sclera clear  NECK: Supple, no lymphadenopathy, no JVD  CHEST/LUNG: CTAB; No wheezes, rales, or rhonchi  HEART: Regular rate and rhythm. Normal S1/S2. No murmurs, rubs, or gallops  ABDOMEN: Soft, non-tender, non-distended; normal bowel sounds, no organomegaly  EXTREMITIES:  2+ peripheral pulses b/l, No clubbing, cyanosis, or edema  NEUROLOGY: A&O x 3, no focal deficits  SKIN: No rashes or lesions    ============================I/O===========================   I&O's Detail    12 Jun 2022 07:01  -  13 Jun 2022 07:00  --------------------------------------------------------  IN:    Heparin: 360 mL    Milrinone: 261.6 mL    Oral Fluid: 660 mL  Total IN: 1281.6 mL    OUT:    Voided (mL): 3160 mL  Total OUT: 3160 mL    Total NET: -1878.4 mL      13 Jun 2022 07:01  -  13 Jun 2022 22:38  --------------------------------------------------------  IN:    Heparin: 180 mL    Milrinone: 130.8 mL    Nitroprusside: 54.7 mL    Oral Fluid: 600 mL  Total IN: 965.5 mL    OUT:    Voided (mL): 1575 mL  Total OUT: 1575 mL    Total NET: -609.5 mL        ============================ LABS =========================                        12.9   11.57 )-----------( 222      ( 13 Jun 2022 05:00 )             38.6     06-13    130<L>  |  98  |  23  ----------------------------<  136<H>  4.5   |  21<L>  |  1.14    Ca    9.3      13 Jun 2022 05:00  Phos  3.6     06-13  Mg     1.9     06-13    TPro  6.8  /  Alb  3.1<L>  /  TBili  0.6  /  DBili  x   /  AST  40  /  ALT  52<H>  /  AlkPhos  85  06-13       LIVER FUNCTIONS - ( 13 Jun 2022 05:00 )  Alb: 3.1 g/dL / Pro: 6.8 g/dL / ALK PHOS: 85 U/L / ALT: 52 U/L / AST: 40 U/L / GGT: x           PT/INR - ( 13 Jun 2022 05:00 )   PT: 13.9 sec;   INR: 1.21 ratio         PTT - ( 13 Jun 2022 05:00 )  PTT:58.8 sec    Lactate, Blood: 1.0 mmol/L (06-13-22 @ 05:00)  Blood Gas Venous - Lactate: 1.0 mmol/L (06-13-22 @ 04:45)      ======================Micro/Rad/Cardio=================  Telemetry: Reviewed   EKG: Reviewed  CXR: Reviewed  Culture: Reviewed   ======================================================  PAST MEDICAL & SURGICAL HISTORY:  AV block      Essential hypertension      Chronic HFrEF (heart failure with reduced ejection fraction)      Chronic kidney disease, unspecified CKD stage      CAD (coronary artery disease)      HLD (hyperlipidemia)      Type 2 diabetes mellitus      Artificial cardiac pacemaker        ====================ASSESSMENT ==============  65 y/o male w/ PMHx HTN, HLD, type 2 DM, chronic HFrEF (EF 25-30% by Echo), complete heart block s/p PPM (in 2006, upgraded to BiV-ICD in 09/2016), CAD (s/p recent BERNABE mid LAD at [facility] on 04/18/2022), and stage II CKD (baseline Cr ~1.3) presented with near syncope to OSH found to have 41 episodes of VT on device, s/p unsuccessful VT ablation and transferred to [facility] for management of cardiogenic shock and advanced therapy eval.     Plan:  ====================== NEUROLOGY=====================   AAOx3  - No active issues  - Tylenol PRN for fevers/pain    acetaminophen     Tablet .. 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  - bedside nipride study done 5/29 with reduction in PVR to <2    ====================CARDIOVASCULAR==================  Cardiogenic shock  - Maintain IABP 1:1,monitor PTT on heparin gtt  - Maintain Milrinone 0.5mcg/kg/min   - Cont hydral 100 TID and ISDN 40TID for AL Reduction, Assisted mean goal 70-80   - monitor hemodynamics and perfusion indices  - HF following, listed for OHT Status 2E  - 6/13: HF recommended starting nipride, patient already on isordil    Relevant studies:  - TTE 5/21: LV 5.2 cm, LVEF 10-15%, LVOT VTI 10 cm, moderate RV dysfunction, mild AI, minimal MR  - TTE on 5/27: EF 15% Severe global LV systolic dysfunction. RV enlargement with decreased RV systolic function.  - LHC 5/23: patent mLAD stent with slow flow, D1 with 40-50% stenosis  - RHC 5/26: RA 12, PA 70/40 (50), PCWP 22, Milana CO/CI 2.3/1.3, MAP 83 with SVR 2469, PVR 12 PERSAUD  - 5/27: RA 10, PA 76/35 (49), PCWP 34, PA sat 57% with Milana CO/CI 3.1/1.6, MAP 71 with SVR 1574, PVR 4.8  - 6/6 RHC: CVP 17, RV 75/4, PA 80/36, PCW 22, CI 1.88. IABP placed and pt transferred to CICU.      VTach   - s/p EPS on 5/24, unable to perform ablation as no substrate found and did not induce VT because of severely low EF   - Interrogation of ICD @Dr Wilson's office 5/20: back-to-back episodes of VT @ 200+ bpm all terminating with ATP. Since last cath pt has had 41 VT episodes (all falling into VF zone)  - Normal device function. BIV pacing 97%. No programming changes made  - c/w Amiodarone 200 QD  - c/w Toprol 25 daily  - No further VT on inotropic support  - keep K 4-4.5    CAD   - Chest pain free and compliant with DAPT since last PCI 4/18/22 per discussion with interventional at OSH, ok to DC plavix pending cardiac surgery, last dose 5/28  - Cardiac cath @[facility] 4/18/22: mLAD 90% s/p BERNABE, LCx mild disease, RCA mild disease s/p diagnostic cardiac cath w/ Dr Wagner on 05/23/2022   - Cont ASA, d/c lipitor for elevated LFTs, pending transplant    aspirin enteric coated 81 milliGRAM(s) Oral daily  aMIOdarone    Tablet 200 milliGRAM(s) Oral daily  hydrALAZINE 100 milliGRAM(s) Oral every 8 hours  isosorbide   dinitrate Tablet (ISORDIL) 40 milliGRAM(s) Oral three times a day  metoprolol succinate ER 25 milliGRAM(s) Oral daily  milrinone Infusion 0.5 MICROgram(s)/kG/Min (11 mL/Hr) IV Continuous <Continuous>  nitroprusside Infusion 0.25 MICROgram(s)/kG/Min (2.73 mL/Hr) IV Continuous <Continuous>    ===================HEMATOLOGIC/ONC ===================  ?Secondary Polycythemia Vera   - elevated EPO, hgb in normal range  - Heme following peripherally  - f/u MELINA-2     AC therapy   - Heparin gtt for IABP, low ptt goal    heparin  Infusion 1200 Unit(s)/Hr (15 mL/Hr) IV Continuous <Continuous>    ===================== RENAL =========================  JAGRUTI on CKD II  Admitted w/ Cr 2.01 (baseline Cr ~1.3) now rising with inotropic support; 1.10->1.13->1.14  - likely i/s/o cardiogenic shock given RHC findings of CI 1.88  - Avoid nephrotoxic agents, NSAIDs. Renally dose meds.  - monitor strict I/Os and continue current cardiac support  - Continue monitoring urine output    ==================== GASTROINTESTINAL===================  Diet:   - Tolerating PO diet    Constipation:  - Cont. bowel regimen Miralax, Senna, PRN simethicone and bisacodyl   - s/p colonoscopy 6/3 with 5 polyps removed and biopsied, benign findings- GI recs for 3 yr follow up colo    bisacodyl Suppository 10 milliGRAM(s) Rectal daily PRN Constipation  pantoprazole    Tablet 40 milliGRAM(s) Oral before breakfast  polyethylene glycol 3350 17 Gram(s) Oral daily  senna 1 Tablet(s) Oral at bedtime  simethicone 80 milliGRAM(s) Chew every 6 hours PRN Gas    =======================    ENDOCRINE  =====================  T2DM (A1C 6.2 on admission)  - Cont. ISS, glucose controlled, monitor FS closely     Gout flare, Left knee (previously R)  - continue allopurinol, s/p prednisone    allopurinol 100 milliGRAM(s) Oral daily  insulin lispro (ADMELOG) corrective regimen sliding scale   SubCutaneous three times a day before meals  insulin lispro (ADMELOG) corrective regimen sliding scale   SubCutaneous at bedtime    ========================INFECTIOUS DISEASE================  RUE Thrombophlebitis  - RUE US Duplex showing superficial thrombus  - s/p course of ancef    Febrile 6/7-8 overnight x1   - UA neg, BCx on 6/7 NGTD x2, RVP neg   - follow fever curve, WBC       Patient requires continuous monitoring with bedside rhythm monitoring, pulse ox monitoring, and intermittent blood gas analysis. Care plan discussed with ICU care team. Patient remained critical and at risk for life threatening decompensation.  Patient seen, examined and plan discussed with CCU team during rounds.     I have personally provided ____ minutes of critical care time excluding time spent on separate procedures, in addition to initial critical care time provided by the CICU Attending, Dr. Blandon      By signing my name below, Disha COLLINS, attest that this documentation has been prepared under the direction and in the presence of Alisia Manriquez, NP   Electronically signed: Mini Love, 06-13-22 @ 22:38    Alisia COLLINS, personally performed the services described in this documentation. all medical record entries made by the scribe were at my direction and in my presence. I have reviewed the chart and agree that the record reflects my personal performance and is accurate and complete  Electronically signed: Alisia Manriquez NP

## 2022-06-13 NOTE — PROGRESS NOTE ADULT - NS ATTEND AMEND GEN_ALL_CORE FT
66yrs, stage D HF. familial CMP. LVEF 10-15. LVEDD 5.2.   s/p CRTD.   s/p PCI LAD April 2022.   CKD 1.4 baseline.   Adal 5.20 near syncope. 41 episodes VT.   [facility] no new CAD. EPS not amenable ablation.   transferred 5.26. for eval.   IABP 6.6.   listed 6.6 UNOS IIe (SBP), ABO A, PRA O.   last hemos 6.8 milrinone .5, IABP 1:1 CVP 4, PA 68/25 38, PA 68, Milana 5/2.6 (no PCWP).   deactivated last week for fever. cultures neg 48hrs reactivated 6.10.   no fevers over weekend.   meds; milrinone .5, heparin (59), amnio 200, HDZN 100 tid, toprol 25, ISDN 40, asa, statin, allopur, insulin PPI.   HR 80-100SR, BIV paced, assist MAPs 82-91, aug reilly 101,   I/o: -1900, -1.6  06-13  130<L>  |  98  |  23  ----------------------------<  136<H>  4.5   |  21<L>  |  1.14  Ca    9.3      13 Jun 2022 05:00  Phos  3.6     06-13  Mg     1.9     06-13  TPro  6.8  /  Alb  3.1<L>  /  TBili  0.6  /  DBili  x   /  AST  40  /  ALT  52<H>  /  AlkPhos  85  06-13                        12.9   11.57 )-----------( 222      ( 13 Jun 2022 05:00 )             38.6   WBC 11.5, 13.4, 13.6   last culture 6.7.   stable UNOS IIe, ABO A, PRA 0.   would add nipride for MAP 70-80, target 2ug/kg/min  please ask PT to supply bands/arm exercise d  dc statin in prep for transplant   Dany Dominique

## 2022-06-13 NOTE — PROGRESS NOTE ADULT - SUBJECTIVE AND OBJECTIVE BOX
INFECTIOUS DISEASES FOLLOW UP-- Arabella Escalante  [phone]    This is a follow up note for this  66yMale with  Cardiomyopathy        ROS:  CONSTITUTIONAL:  No fever, good appetite  CARDIOVASCULAR:  No chest pain or palpitations  RESPIRATORY:  No dyspnea  GASTROINTESTINAL:  No nausea, vomiting, diarrhea, or abdominal pain  GENITOURINARY:  No dysuria  NEUROLOGIC:  No headache,     Allergies    No Known Allergies    Intolerances        ANTIBIOTICS/RELEVANT:  antimicrobials    immunologic:    OTHER:  acetaminophen     Tablet .. 650 milliGRAM(s) Oral every 6 hours PRN  allopurinol 100 milliGRAM(s) Oral daily  aMIOdarone    Tablet 200 milliGRAM(s) Oral daily  aspirin enteric coated 81 milliGRAM(s) Oral daily  bisacodyl Suppository 10 milliGRAM(s) Rectal daily PRN  chlorhexidine 2% Cloths 1 Application(s) Topical daily  chlorhexidine 4% Liquid 1 Application(s) Topical <User Schedule>  heparin  Infusion 1200 Unit(s)/Hr IV Continuous <Continuous>  hydrALAZINE 100 milliGRAM(s) Oral every 8 hours  insulin lispro (ADMELOG) corrective regimen sliding scale   SubCutaneous at bedtime  insulin lispro (ADMELOG) corrective regimen sliding scale   SubCutaneous three times a day before meals  isosorbide   dinitrate Tablet (ISORDIL) 40 milliGRAM(s) Oral three times a day  lidocaine   4% Patch 1 Patch Transdermal daily  metoprolol succinate ER 25 milliGRAM(s) Oral daily  milrinone Infusion 0.5 MICROgram(s)/kG/Min IV Continuous <Continuous>  nitroprusside Infusion 0.25 MICROgram(s)/kG/Min IV Continuous <Continuous>  pantoprazole    Tablet 40 milliGRAM(s) Oral before breakfast  polyethylene glycol 3350 17 Gram(s) Oral daily  senna 1 Tablet(s) Oral at bedtime  simethicone 80 milliGRAM(s) Chew every 6 hours PRN      Objective:  Vital Signs Last 24 Hrs  T(C): 36.9 (13 Jun 2022 15:00), Max: 37.3 (12 Jun 2022 19:00)  T(F): 98.4 (13 Jun 2022 15:00), Max: 99.1 (12 Jun 2022 19:00)  HR: 80 (13 Jun 2022 17:00) (80 - 109)  BP: --  BP(mean): --  RR: 22 (13 Jun 2022 17:00) (16 - 32)  SpO2: 92% (13 Jun 2022 17:00) (90% - 95%)    PHYSICAL EXAM:  Constitutional:no acute distress  Eyes:JOHNY, EOMI  Ear/Nose/Throat: no oral lesions, 	  Respiratory: clear BL  Cardiovascular: S1S2  Gastrointestinal:soft, (+) BS, no tenderness  Extremities:no e/e/c  No Lymphadenopathy  IV sites not inflammed.    LABS:                        12.9   11.57 )-----------( 222      ( 13 Jun 2022 05:00 )             38.6     06-13    130<L>  |  98  |  23  ----------------------------<  136<H>  4.5   |  21<L>  |  1.14    Ca    9.3      13 Jun 2022 05:00  Phos  3.6     06-13  Mg     1.9     06-13    TPro  6.8  /  Alb  3.1<L>  /  TBili  0.6  /  DBili  x   /  AST  40  /  ALT  52<H>  /  AlkPhos  85  06-13    PT/INR - ( 13 Jun 2022 05:00 )   PT: 13.9 sec;   INR: 1.21 ratio         PTT - ( 13 Jun 2022 05:00 )  PTT:58.8 sec      MICROBIOLOGY:            RECENT CULTURES:  06-07 @ 22:47  .Blood Blood  --  --  --    No Growth Final  --      RADIOLOGY & ADDITIONAL STUDIES:  < from: Xray Chest 1 View- PORTABLE-Routine (Xray Chest 1 View- PORTABLE-Routine in AM.) (06.13.22 @ 04:14) >  Frontal expiratory view of the chest shows the heart to be similar in   size. IABP marker reaches the central aortic knob. Left cardiac   fibrillator is again noted.    The lungs show less pulmonary congestion and there is no evidence of   pneumothorax nor pleural effusion.    IMPRESSION:  Marker as noted.    < end of copied text >   INFECTIOUS DISEASES FOLLOW UP-- Arabella Escalante  [phone]    This is a follow up note for this  66yMale with  Cardiomyopathy        ROS:  CONSTITUTIONAL:  No fever, good appetite  CARDIOVASCULAR:  No chest pain or palpitations  RESPIRATORY:  No dyspnea  GASTROINTESTINAL:  No nausea, vomiting, diarrhea, or abdominal pain  GENITOURINARY:  No dysuria  NEUROLOGIC:  No headache,     Allergies    No Known Allergies    Intolerances        ANTIBIOTICS/RELEVANT:  antimicrobials    immunologic:    OTHER:  acetaminophen     Tablet .. 650 milliGRAM(s) Oral every 6 hours PRN  allopurinol 100 milliGRAM(s) Oral daily  aMIOdarone    Tablet 200 milliGRAM(s) Oral daily  aspirin enteric coated 81 milliGRAM(s) Oral daily  bisacodyl Suppository 10 milliGRAM(s) Rectal daily PRN  chlorhexidine 2% Cloths 1 Application(s) Topical daily  chlorhexidine 4% Liquid 1 Application(s) Topical <User Schedule>  heparin  Infusion 1200 Unit(s)/Hr IV Continuous <Continuous>  hydrALAZINE 100 milliGRAM(s) Oral every 8 hours  insulin lispro (ADMELOG) corrective regimen sliding scale   SubCutaneous at bedtime  insulin lispro (ADMELOG) corrective regimen sliding scale   SubCutaneous three times a day before meals  isosorbide   dinitrate Tablet (ISORDIL) 40 milliGRAM(s) Oral three times a day  lidocaine   4% Patch 1 Patch Transdermal daily  metoprolol succinate ER 25 milliGRAM(s) Oral daily  milrinone Infusion 0.5 MICROgram(s)/kG/Min IV Continuous <Continuous>  nitroprusside Infusion 0.25 MICROgram(s)/kG/Min IV Continuous <Continuous>  pantoprazole    Tablet 40 milliGRAM(s) Oral before breakfast  polyethylene glycol 3350 17 Gram(s) Oral daily  senna 1 Tablet(s) Oral at bedtime  simethicone 80 milliGRAM(s) Chew every 6 hours PRN      Objective:  Vital Signs Last 24 Hrs  T(C): 36.9 (13 Jun 2022 15:00), Max: 37.3 (12 Jun 2022 19:00)  T(F): 98.4 (13 Jun 2022 15:00), Max: 99.1 (12 Jun 2022 19:00)  HR: 80 (13 Jun 2022 17:00) (80 - 109)  BP: --  BP(mean): --  RR: 22 (13 Jun 2022 17:00) (16 - 32)  SpO2: 92% (13 Jun 2022 17:00) (90% - 95%)    PHYSICAL EXAM:  Constitutional:no acute distress  Eyes:JOHNY, EOMI  Ear/Nose/Throat: no oral lesions, 	  Respiratory: clear BL  Cardiovascular: S1S2  Gastrointestinal:soft, (+) BS, no tenderness  Extremities:no e/e/c IABP site CDI  No Lymphadenopathy  IV sites not inflammed.    LABS:                        12.9   11.57 )-----------( 222      ( 13 Jun 2022 05:00 )             38.6     06-13    130<L>  |  98  |  23  ----------------------------<  136<H>  4.5   |  21<L>  |  1.14    Ca    9.3      13 Jun 2022 05:00  Phos  3.6     06-13  Mg     1.9     06-13    TPro  6.8  /  Alb  3.1<L>  /  TBili  0.6  /  DBili  x   /  AST  40  /  ALT  52<H>  /  AlkPhos  85  06-13    PT/INR - ( 13 Jun 2022 05:00 )   PT: 13.9 sec;   INR: 1.21 ratio         PTT - ( 13 Jun 2022 05:00 )  PTT:58.8 sec      MICROBIOLOGY:            RECENT CULTURES:  06-07 @ 22:47  .Blood Blood  --  --  --    No Growth Final  --      RADIOLOGY & ADDITIONAL STUDIES:  < from: Xray Chest 1 View- PORTABLE-Routine (Xray Chest 1 View- PORTABLE-Routine in AM.) (06.13.22 @ 04:14) >  Frontal expiratory view of the chest shows the heart to be similar in   size. IABP marker reaches the central aortic knob. Left cardiac   fibrillator is again noted.    The lungs show less pulmonary congestion and there is no evidence of   pneumothorax nor pleural effusion.    IMPRESSION:  Marker as noted.    < end of copied text >

## 2022-06-14 LAB
ALBUMIN SERPL ELPH-MCNC: 3.1 G/DL — LOW (ref 3.3–5)
ALP SERPL-CCNC: 96 U/L — SIGNIFICANT CHANGE UP (ref 40–120)
ALT FLD-CCNC: 64 U/L — HIGH (ref 10–45)
ANION GAP SERPL CALC-SCNC: 14 MMOL/L — SIGNIFICANT CHANGE UP (ref 5–17)
APTT BLD: 36.7 SEC — HIGH (ref 27.5–35.5)
APTT BLD: 51.6 SEC — HIGH (ref 27.5–35.5)
AST SERPL-CCNC: 49 U/L — HIGH (ref 10–40)
BASOPHILS # BLD AUTO: 0.1 K/UL — SIGNIFICANT CHANGE UP (ref 0–0.2)
BASOPHILS NFR BLD AUTO: 0.7 % — SIGNIFICANT CHANGE UP (ref 0–2)
BILIRUB SERPL-MCNC: 0.6 MG/DL — SIGNIFICANT CHANGE UP (ref 0.2–1.2)
BUN SERPL-MCNC: 23 MG/DL — SIGNIFICANT CHANGE UP (ref 7–23)
CALCIUM SERPL-MCNC: 9.3 MG/DL — SIGNIFICANT CHANGE UP (ref 8.4–10.5)
CHLORIDE SERPL-SCNC: 95 MMOL/L — LOW (ref 96–108)
CO2 SERPL-SCNC: 19 MMOL/L — LOW (ref 22–31)
CREAT SERPL-MCNC: 1.22 MG/DL — SIGNIFICANT CHANGE UP (ref 0.5–1.3)
EGFR: 66 ML/MIN/1.73M2 — SIGNIFICANT CHANGE UP
EOSINOPHIL # BLD AUTO: 0.48 K/UL — SIGNIFICANT CHANGE UP (ref 0–0.5)
EOSINOPHIL NFR BLD AUTO: 3.5 % — SIGNIFICANT CHANGE UP (ref 0–6)
GLUCOSE BLDC GLUCOMTR-MCNC: 129 MG/DL — HIGH (ref 70–99)
GLUCOSE BLDC GLUCOMTR-MCNC: 155 MG/DL — HIGH (ref 70–99)
GLUCOSE BLDC GLUCOMTR-MCNC: 156 MG/DL — HIGH (ref 70–99)
GLUCOSE BLDC GLUCOMTR-MCNC: 172 MG/DL — HIGH (ref 70–99)
GLUCOSE SERPL-MCNC: 160 MG/DL — HIGH (ref 70–99)
HCT VFR BLD CALC: 39.2 % — SIGNIFICANT CHANGE UP (ref 39–50)
HGB BLD-MCNC: 12.9 G/DL — LOW (ref 13–17)
IMM GRANULOCYTES NFR BLD AUTO: 0.7 % — SIGNIFICANT CHANGE UP (ref 0–1.5)
INR BLD: 1.14 RATIO — SIGNIFICANT CHANGE UP (ref 0.88–1.16)
LACTATE SERPL-SCNC: 1.4 MMOL/L — SIGNIFICANT CHANGE UP (ref 0.7–2)
LYMPHOCYTES # BLD AUTO: 1.69 K/UL — SIGNIFICANT CHANGE UP (ref 1–3.3)
LYMPHOCYTES # BLD AUTO: 12.4 % — LOW (ref 13–44)
MAGNESIUM SERPL-MCNC: 1.8 MG/DL — SIGNIFICANT CHANGE UP (ref 1.6–2.6)
MCHC RBC-ENTMCNC: 28.9 PG — SIGNIFICANT CHANGE UP (ref 27–34)
MCHC RBC-ENTMCNC: 32.9 GM/DL — SIGNIFICANT CHANGE UP (ref 32–36)
MCV RBC AUTO: 87.9 FL — SIGNIFICANT CHANGE UP (ref 80–100)
MONOCYTES # BLD AUTO: 1.23 K/UL — HIGH (ref 0–0.9)
MONOCYTES NFR BLD AUTO: 9 % — SIGNIFICANT CHANGE UP (ref 2–14)
NEUTROPHILS # BLD AUTO: 10.08 K/UL — HIGH (ref 1.8–7.4)
NEUTROPHILS NFR BLD AUTO: 73.7 % — SIGNIFICANT CHANGE UP (ref 43–77)
NRBC # BLD: 0 /100 WBCS — SIGNIFICANT CHANGE UP (ref 0–0)
PHOSPHATE SERPL-MCNC: 3.7 MG/DL — SIGNIFICANT CHANGE UP (ref 2.5–4.5)
PLATELET # BLD AUTO: 241 K/UL — SIGNIFICANT CHANGE UP (ref 150–400)
POTASSIUM SERPL-MCNC: 4.7 MMOL/L — SIGNIFICANT CHANGE UP (ref 3.5–5.3)
POTASSIUM SERPL-SCNC: 4.7 MMOL/L — SIGNIFICANT CHANGE UP (ref 3.5–5.3)
PROT SERPL-MCNC: 6.9 G/DL — SIGNIFICANT CHANGE UP (ref 6–8.3)
PROTHROM AB SERPL-ACNC: 13.2 SEC — SIGNIFICANT CHANGE UP (ref 10.5–13.4)
RBC # BLD: 4.46 M/UL — SIGNIFICANT CHANGE UP (ref 4.2–5.8)
RBC # FLD: 15.2 % — HIGH (ref 10.3–14.5)
SODIUM SERPL-SCNC: 128 MMOL/L — LOW (ref 135–145)
WBC # BLD: 13.67 K/UL — HIGH (ref 3.8–10.5)
WBC # FLD AUTO: 13.67 K/UL — HIGH (ref 3.8–10.5)

## 2022-06-14 PROCEDURE — 71045 X-RAY EXAM CHEST 1 VIEW: CPT | Mod: 26

## 2022-06-14 PROCEDURE — 99291 CRITICAL CARE FIRST HOUR: CPT

## 2022-06-14 PROCEDURE — 99292 CRITICAL CARE ADDL 30 MIN: CPT

## 2022-06-14 PROCEDURE — 93010 ELECTROCARDIOGRAM REPORT: CPT

## 2022-06-14 PROCEDURE — 76700 US EXAM ABDOM COMPLETE: CPT | Mod: 26

## 2022-06-14 RX ORDER — MAGNESIUM SULFATE 500 MG/ML
2 VIAL (ML) INJECTION ONCE
Refills: 0 | Status: COMPLETED | OUTPATIENT
Start: 2022-06-14 | End: 2022-06-14

## 2022-06-14 RX ADMIN — Medication 100 MILLIGRAM(S): at 12:42

## 2022-06-14 RX ADMIN — Medication 81 MILLIGRAM(S): at 12:41

## 2022-06-14 RX ADMIN — Medication 25 MILLIGRAM(S): at 05:08

## 2022-06-14 RX ADMIN — ISOSORBIDE DINITRATE 40 MILLIGRAM(S): 5 TABLET ORAL at 05:08

## 2022-06-14 RX ADMIN — MILRINONE LACTATE 11 MICROGRAM(S)/KG/MIN: 1 INJECTION, SOLUTION INTRAVENOUS at 13:48

## 2022-06-14 RX ADMIN — Medication 1: at 12:42

## 2022-06-14 RX ADMIN — Medication 100 MILLIGRAM(S): at 21:54

## 2022-06-14 RX ADMIN — LIDOCAINE 1 PATCH: 4 CREAM TOPICAL at 12:42

## 2022-06-14 RX ADMIN — Medication 100 MILLIGRAM(S): at 13:48

## 2022-06-14 RX ADMIN — POLYETHYLENE GLYCOL 3350 17 GRAM(S): 17 POWDER, FOR SOLUTION ORAL at 12:42

## 2022-06-14 RX ADMIN — Medication 1: at 08:44

## 2022-06-14 RX ADMIN — Medication 25 GRAM(S): at 06:55

## 2022-06-14 RX ADMIN — ISOSORBIDE DINITRATE 40 MILLIGRAM(S): 5 TABLET ORAL at 13:48

## 2022-06-14 RX ADMIN — Medication 650 MILLIGRAM(S): at 03:20

## 2022-06-14 RX ADMIN — Medication 650 MILLIGRAM(S): at 04:00

## 2022-06-14 RX ADMIN — Medication 100 MILLIGRAM(S): at 05:08

## 2022-06-14 RX ADMIN — AMIODARONE HYDROCHLORIDE 200 MILLIGRAM(S): 400 TABLET ORAL at 05:09

## 2022-06-14 RX ADMIN — PANTOPRAZOLE SODIUM 40 MILLIGRAM(S): 20 TABLET, DELAYED RELEASE ORAL at 05:08

## 2022-06-14 RX ADMIN — CHLORHEXIDINE GLUCONATE 1 APPLICATION(S): 213 SOLUTION TOPICAL at 05:04

## 2022-06-14 RX ADMIN — LIDOCAINE 1 PATCH: 4 CREAM TOPICAL at 18:30

## 2022-06-14 RX ADMIN — Medication 650 MILLIGRAM(S): at 22:49

## 2022-06-14 RX ADMIN — ISOSORBIDE DINITRATE 40 MILLIGRAM(S): 5 TABLET ORAL at 21:55

## 2022-06-14 RX ADMIN — SODIUM NITROPRUSSIDE 2.73 MICROGRAM(S)/KG/MIN: 50 INJECTION INTRAVENOUS at 18:15

## 2022-06-14 NOTE — PROGRESS NOTE ADULT - ASSESSMENT
67 y/o male w/ PMHx HTN, HLD, type 2 DM, chronic HFrEF (EF 25-30% by Echo), complete heart block s/p PPM (in 2006, upgraded to BiV-ICD in 09/2016), CAD (s/p recent BERNABE mid LAD at [facility] on 04/18/2022), and stage II CKD (baseline Cr ~1.3) presented with near syncope to OSH found to have 41 episodes of VT on device, s/p unsuccessful VT ablation and transferred to [facility] for management of cardiogenic shock and advanced therapy eval.     Plan:  ====================== NEUROLOGY=====================   AAOx3  - No active issues  - Tylenol PRN for fevers/pain    acetaminophen     Tablet .. 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       Relevant studies:  - TTE 5/21: LV 5.2 cm, LVEF 10-15%, LVOT VTI 10 cm, moderate RV dysfunction, mild AI, minimal MR  - TTE on 5/27: EF 15% Severe global LV systolic dysfunction. RV enlargement with decreased RV systolic function.  - [facility] 5/23: patent mLAD stent with slow flow, D1 with 40-50% stenosis  - [facility] 5/26: RA 12, PA 70/40 (50), PCWP 22, Milana CO/CI 2.3/1.3, MAP 83 with SVR 2469, PVR 12 PERSAUD  - 5/27: RA 10, PA 76/35 (49), PCWP 34, PA sat 57% with Milana CO/CI 3.1/1.6, MAP 71 with SVR 1574, PVR 4.8  - 6/6 RHC: CVP 17, RV 75/4, PA 80/36, PCW 22, CI 1.88. IABP placed and pt transferred to CICU.      VTach   - s/p EPS on 5/24, unable to perform ablation as no substrate found and did not induce VT because of severely low EF   - Interrogation of ICD @Dr Wilson's office 5/20: back-to-back episodes of VT @ 200+ bpm all terminating with ATP. Since last cath pt has had 41 VT episodes (all falling into VF zone)  - Normal device function. BIV pacing 97%. No programming changes made  - c/w Amiodarone 200 QD, if liver enzymes continue to uptrend consider adjusting  - c/w Toprol 25 daily  - No further VT on inotropic support  - keep K 4-4.5    CAD   - Chest pain free and compliant with DAPT since last PCI 4/18/22 per discussion with interventional at OSH, ok to DC plavix pending cardiac surgery, last dose 5/28  - Cardiac cath @[facility] 4/18/22: mLAD 90% s/p BERNABE, LCx mild disease, RCA mild disease s/p diagnostic cardiac cath w/ Dr Wagner on 05/23/2022   - Cont ASA, d/c lipitor for elevated LFTs, pending transplant    aspirin enteric coated 81 milliGRAM(s) Oral daily  aMIOdarone    Tablet 200 milliGRAM(s) Oral daily  hydrALAZINE 100 milliGRAM(s) Oral every 8 hours  isosorbide   dinitrate Tablet (ISORDIL) 40 milliGRAM(s) Oral three times a day  metoprolol succinate ER 25 milliGRAM(s) Oral daily  milrinone Infusion 0.5 MICROgram(s)/kG/Min (11 mL/Hr) IV Continuous <Continuous>  nitroprusside Infusion 0.25 MICROgram(s)/kG/Min (2.73 mL/Hr) IV Continuous <Continuous>    ===================HEMATOLOGIC/ONC ===================  ?Secondary Polycythemia Vera   - elevated EPO, hgb in normal range  - Heme following peripherally  - f/u MELINA-2     AC therapy   - Heparin gtt for IABP, low ptt goal    heparin  Infusion 1200 Unit(s)/Hr (15 mL/Hr) IV Continuous <Continuous>    ===================== RENAL =========================  JAGRUTI on CKD II  Admitted w/ Cr 2.01 (baseline Cr ~1.3) now rising with inotropic support; 1.10->1.13->1.14  - likely i/s/o cardiogenic shock given RHC findings of CI 1.88  - Avoid nephrotoxic agents, NSAIDs. Renally dose meds.  - monitor strict I/Os and continue current cardiac support  - Continue monitoring urine output    ==================== GASTROINTESTINAL===================  Diet:   - Tolerating PO diet    Constipation:  - Cont. bowel regimen Miralax, Senna, PRN simethicone and bisacodyl   - s/p colonoscopy 6/3 with 5 polyps removed and biopsied, benign findings- GI recs for 3 yr follow up colo    bisacodyl Suppository 10 milliGRAM(s) Rectal daily PRN Constipation  pantoprazole    Tablet 40 milliGRAM(s) Oral before breakfast  polyethylene glycol 3350 17 Gram(s) Oral daily  senna 1 Tablet(s) Oral at bedtime  simethicone 80 milliGRAM(s) Chew every 6 hours PRN Gas    =======================    ENDOCRINE  =====================  T2DM (A1C 6.2 on admission)  - Cont. ISS, glucose controlled, monitor FS closely     Gout flare, Left knee (previously R)  - continue allopurinol, s/p prednisone    allopurinol 100 milliGRAM(s) Oral daily  insulin lispro (ADMELOG) corrective regimen sliding scale   SubCutaneous three times a day before meals  insulin lispro (ADMELOG) corrective regimen sliding scale   SubCutaneous at bedtime    ========================INFECTIOUS DISEASE================  RUE Thrombophlebitis  - RUE US Duplex showing superficial thrombus  - s/p course of ancef    Febrile 6/7-8 overnight x1   - UA neg, BCx on 6/7 NGTD x2, RVP neg   - follow fever curve, WBC    65 y/o male w/ PMHx HTN, HLD, type 2 DM, chronic HFrEF (EF 25-30% by Echo), complete heart block s/p PPM (in 2006, upgraded to BiV-ICD in 09/2016), CAD (s/p recent BERNABE mid LAD at [facility] on 04/18/2022), and stage II CKD (baseline Cr ~1.3) presented with near syncope to OSH found to have 41 episodes of VT on device, s/p unsuccessful VT ablation and transferred to [facility] for management of cardiogenic shock and advanced therapy eval.     Plan:  ====================== NEUROLOGY=====================   AAOx3  - No active issues  - Tylenol PRN for fevers/pain    acetaminophen     Tablet .. 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       Relevant studies:  - TTE 5/21: LV 5.2 cm, LVEF 10-15%, LVOT VTI 10 cm, moderate RV dysfunction, mild AI, minimal MR  - TTE on 5/27: EF 15% Severe global LV systolic dysfunction. RV enlargement with decreased RV systolic function.  - [facility] 5/23: patent mLAD stent with slow flow, D1 with 40-50% stenosis  - [facility] 5/26: RA 12, PA 70/40 (50), PCWP 22, Milana CO/CI 2.3/1.3, MAP 83 with SVR 2469, PVR 12 PERSAUD  - 5/27: RA 10, PA 76/35 (49), PCWP 34, PA sat 57% with Milana CO/CI 3.1/1.6, MAP 71 with SVR 1574, PVR 4.8  - 6/6 RHC: CVP 17, RV 75/4, PA 80/36, PCW 22, CI 1.88. IABP placed and pt transferred to CICU.      VTach   - s/p EPS on 5/24, unable to perform ablation as no substrate found and did not induce VT because of severely low EF   - Interrogation of ICD @Dr Wilson's office 5/20: back-to-back episodes of VT @ 200+ bpm all terminating with ATP. Since last cath pt has had 41 VT episodes (all falling into VF zone)  - Normal device function. BIV pacing 97%. No programming changes made  - c/w Amiodarone 200 QD, if liver enzymes continue to uptrend consider adjusting  - c/w Toprol 25 daily  - No further VT on inotropic support  - keep K 4-4.5    CAD   - Chest pain free and compliant with DAPT since last PCI 4/18/22 per discussion with interventional at OSH, ok to DC plavix pending cardiac surgery, last dose 5/28  - Cardiac cath @[facility] 4/18/22: mLAD 90% s/p BERNABE, LCx mild disease, RCA mild disease s/p diagnostic cardiac cath w/ Dr Wagner on 05/23/2022   - Cont ASA, d/c lipitor for elevated LFTs, pending transplant    aspirin enteric coated 81 milliGRAM(s) Oral daily  aMIOdarone    Tablet 200 milliGRAM(s) Oral daily  hydrALAZINE 100 milliGRAM(s) Oral every 8 hours  isosorbide   dinitrate Tablet (ISORDIL) 40 milliGRAM(s) Oral three times a day  metoprolol succinate ER 25 milliGRAM(s) Oral daily  milrinone Infusion 0.5 MICROgram(s)/kG/Min (11 mL/Hr) IV Continuous <Continuous>  nitroprusside Infusion 0.25 MICROgram(s)/kG/Min (2.73 mL/Hr) IV Continuous <Continuous>    ===================HEMATOLOGIC/ONC ===================  ?Secondary Polycythemia Vera   - elevated EPO, hgb in normal range  - Heme following peripherally  - f/u MELINA-2     AC therapy   - Heparin gtt for IABP, low ptt goal    heparin  Infusion 1200 Unit(s)/Hr (15 mL/Hr) IV Continuous <Continuous>    ===================== RENAL =========================  JAGRUTI on CKD II  Admitted w/ Cr 2.01 (baseline Cr ~1.3) now rising with inotropic support; 1.10->1.13->1.14  - likely i/s/o cardiogenic shock given RHC findings of CI 1.88  - Avoid nephrotoxic agents, NSAIDs. Renally dose meds.  - monitor strict I/Os and continue current cardiac support  - Continue monitoring urine output    ==================== GASTROINTESTINAL===================    #Elevated Liver Enzymes:  -elevated AST/ALT, normal bilirubin, alk phos. statin d/c 6/13. C/f possible amiodarone toxicity, f/u RUQ first to r/o intrinsic liver pathology, if wnl will consider d/c amiodarone after discussion with HF and EP.     Diet:   - Tolerating PO diet    Constipation:  - Cont. bowel regimen Miralax, Senna, PRN simethicone and bisacodyl   - s/p colonoscopy 6/3 with 5 polyps removed and biopsied, benign findings- GI recs for 3 yr follow up colo    bisacodyl Suppository 10 milliGRAM(s) Rectal daily PRN Constipation  pantoprazole    Tablet 40 milliGRAM(s) Oral before breakfast  polyethylene glycol 3350 17 Gram(s) Oral daily  senna 1 Tablet(s) Oral at bedtime  simethicone 80 milliGRAM(s) Chew every 6 hours PRN Gas    =======================    ENDOCRINE  =====================  T2DM (A1C 6.2 on admission)  - Cont. ISS, glucose controlled, monitor FS closely     Gout flare, Left knee (previously R)  - continue allopurinol, s/p prednisone    allopurinol 100 milliGRAM(s) Oral daily  insulin lispro (ADMELOG) corrective regimen sliding scale   SubCutaneous three times a day before meals  insulin lispro (ADMELOG) corrective regimen sliding scale   SubCutaneous at bedtime    ========================INFECTIOUS DISEASE================  RUE Thrombophlebitis  - RUE US Duplex showing superficial thrombus  - s/p course of ancef    Febrile 6/7-8 overnight x1   - UA neg, BCx on 6/7 NGTD x2, RVP neg   - follow fever curve, WBC    67 y/o male w/ PMHx HTN, HLD, type 2 DM, chronic HFrEF (EF 25-30% by Echo), complete heart block s/p PPM (in 2006, upgraded to BiV-ICD in 09/2016), CAD (s/p recent BERNABE mid LAD at [facility] on 04/18/2022), and stage II CKD (baseline Cr ~1.3) presented with near syncope to OSH found to have 41 episodes of VT on device, s/p unsuccessful VT ablation and transferred to [facility] for management of cardiogenic shock and advanced therapy eval.     Plan:  ====================== NEUROLOGY=====================   AAOx3  - No active issues  - Tylenol PRN for fevers/pain    acetaminophen     Tablet .. 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       Relevant studies:  - TTE 5/21: LV 5.2 cm, LVEF 10-15%, LVOT VTI 10 cm, moderate RV dysfunction, mild AI, minimal MR  - TTE on 5/27: EF 15% Severe global LV systolic dysfunction. RV enlargement with decreased RV systolic function.  - [facility] 5/23: patent mLAD stent with slow flow, D1 with 40-50% stenosis  - [facility] 5/26: RA 12, PA 70/40 (50), PCWP 22, Milana CO/CI 2.3/1.3, MAP 83 with SVR 2469, PVR 12 PERSAUD  - 5/27: RA 10, PA 76/35 (49), PCWP 34, PA sat 57% with Milana CO/CI 3.1/1.6, MAP 71 with SVR 1574, PVR 4.8  - 6/6 RHC: CVP 17, RV 75/4, PA 80/36, PCW 22, CI 1.88. IABP placed and pt transferred to CICU.      VTach   - s/p EPS on 5/24, unable to perform ablation as no substrate found and did not induce VT because of severely low EF   - Interrogation of ICD @Dr Wilson's office 5/20: back-to-back episodes of VT @ 200+ bpm all terminating with ATP. Since last cath pt has had 41 VT episodes (all falling into VF zone)  - Normal device function. BIV pacing 97%. No programming changes made  - c/w Amiodarone 200 QD,   - c/w Toprol 25 daily  - No further VT on inotropic support  - keep K 4-4.5    CAD   - Chest pain free and compliant with DAPT since last PCI 4/18/22 per discussion with interventional at OSH, ok to DC plavix pending cardiac surgery, last dose 5/28  - Cardiac cath @[facility] 4/18/22: mLAD 90% s/p BERNABE, LCx mild disease, RCA mild disease s/p diagnostic cardiac cath w/ Dr Wagner on 05/23/2022   - Cont ASA, d/c lipitor for elevated LFTs, pending transplant    aspirin enteric coated 81 milliGRAM(s) Oral daily  aMIOdarone    Tablet 200 milliGRAM(s) Oral daily  hydrALAZINE 100 milliGRAM(s) Oral every 8 hours  isosorbide   dinitrate Tablet (ISORDIL) 40 milliGRAM(s) Oral three times a day  metoprolol succinate ER 25 milliGRAM(s) Oral daily  milrinone Infusion 0.5 MICROgram(s)/kG/Min (11 mL/Hr) IV Continuous <Continuous>  nitroprusside Infusion 0.25 MICROgram(s)/kG/Min (2.73 mL/Hr) IV Continuous <Continuous>    ===================HEMATOLOGIC/ONC ===================  ?Secondary Polycythemia Vera   - elevated EPO, hgb in normal range  - Heme following peripherally  - f/u MELINA-2     AC therapy   - Heparin gtt for IABP, low ptt goal    heparin  Infusion 1200 Unit(s)/Hr (15 mL/Hr) IV Continuous <Continuous>    ===================== RENAL =========================  JAGRUTI on CKD II  Admitted w/ Cr 2.01 (baseline Cr ~1.3) now rising with inotropic support; 1.10->1.13->1.14  - likely i/s/o cardiogenic shock given RHC findings of CI 1.88  - Avoid nephrotoxic agents, NSAIDs. Renally dose meds.  - monitor strict I/Os and continue current cardiac support  - Continue monitoring urine output    ==================== GASTROINTESTINAL===================    #Elevated Liver Enzymes:  -elevated AST/ALT, normal bilirubin, alk phos. statin d/c 6/13. ? related to amiodarone toxicity, f/u RUQ first to r/o intrinsic liver pathology, if wnl will consider d/c amiodarone after discussion with HF and EP.     Diet:   - Tolerating PO diet    Constipation:  - Cont. bowel regimen Miralax, Senna, PRN simethicone and bisacodyl   - s/p colonoscopy 6/3 with 5 polyps removed and biopsied, benign findings- GI recs for 3 yr follow up colo    bisacodyl Suppository 10 milliGRAM(s) Rectal daily PRN Constipation  pantoprazole    Tablet 40 milliGRAM(s) Oral before breakfast  polyethylene glycol 3350 17 Gram(s) Oral daily  senna 1 Tablet(s) Oral at bedtime  simethicone 80 milliGRAM(s) Chew every 6 hours PRN Gas    =======================    ENDOCRINE  =====================  T2DM (A1C 6.2 on admission)  - Cont. ISS, glucose controlled, monitor FS closely     Gout flare, Left knee (previously R)  - continue allopurinol, s/p prednisone    allopurinol 100 milliGRAM(s) Oral daily  insulin lispro (ADMELOG) corrective regimen sliding scale   SubCutaneous three times a day before meals  insulin lispro (ADMELOG) corrective regimen sliding scale   SubCutaneous at bedtime    ========================INFECTIOUS DISEASE================  RUE Thrombophlebitis  - RUE US Duplex showing superficial thrombus  - s/p course of ancef    Febrile 6/7-8 overnight x1   - UA neg, BCx on 6/7 NGTD x2, RVP neg   - follow fever curve, WBC

## 2022-06-14 NOTE — PHYSICAL THERAPY INITIAL EVALUATION ADULT - PERTINENT HX OF CURRENT PROBLEM, REHAB EVAL
65 y/o [place] M PMHx mixed Ischemic/NICM cardiomyopathy (EF 25-30% at baseline, s/p BIV-ICD) and CAD, initially presented to [facility] with near syncope, found to intermittent episodes of VT; S/p unsuccessful VT ablation. Now transferred to [facility] CCU for advanced therapies evaluation. S/p [place] placement in CCU, course c/b fever on 5/28, s/p subsequent [place] removal. S/p IABP 6/6; Undergoing pre heart t/p w/u 67 y/o [place] M PMHx mixed Ischemic/NICM cardiomyopathy (EF 25-30% at baseline, s/p BIV-ICD) and CAD, initially presented to [facility] with near syncope, found to intermittent episodes of VT; S/p unsuccessful VT ablation. Now transferred to [facility] CCU for advanced therapies evaluation. S/p [place] placement in CCU, course c/b fever on 5/28, s/p subsequent [place] removal. S/p IABP 6/6. 65 y/o [place] M PMHx mixed Ischemic/NICM cardiomyopathy (EF 25-30% at baseline, s/p BIV-ICD) and CAD, initially presented to [facility] with near syncope, found to intermittent episodes of VT; S/p unsuccessful VT ablation. Now transferred to [facility] CCU for advanced therapies evaluation. S/p [place] placement in CCU, course c/b fever on 5/28, s/p subsequent [place] removal. S/p IABP 6/6, s/p OHT 6/21

## 2022-06-14 NOTE — PROGRESS NOTE ADULT - NS ATTEND AMEND GEN_ALL_CORE FT
initiated on nipride, at peak dose 3.5 felt nauseas, decreased to 1ug with overall improved profile in assisted means.  meds: milrinone .5, nipride 1, heparin (52), amnio 200, HDZN 100 tid, ISDN 40, toprol 25, asa, allopruinol, insulin   HR biV 80, aug mean 70-80, aug diast , afebrile  I/o: -1.1   06-14  128<L>  |  95<L>  |  23  ----------------------------<  160<H>  4.7   |  19<L>  |  1.22    Ca    9.3      14 Jun 2022 05:17  Phos  3.7     06-14  Mg     1.8     06-14  TPro  6.9  /  Alb  3.1<L>  /  TBili  0.6  /  DBili  x   /  AST  49<H>  /  ALT  64<H>  /  AlkPhos  96  06-14                        12.9   13.67 )-----------( 241      ( 14 Jun 2022 05:17 )             39.2   WBC 13.67 11.5, 13,   No change in current meds.   patient remains critically ill on IABP awaiting heart transplant   DILMA Valenzuela PRA O. Simon Maybaum

## 2022-06-14 NOTE — PROGRESS NOTE ADULT - SUBJECTIVE AND OBJECTIVE BOX
Subjective:    Medications:  acetaminophen     Tablet .. 650 milliGRAM(s) Oral every 6 hours PRN  allopurinol 100 milliGRAM(s) Oral daily  aMIOdarone    Tablet 200 milliGRAM(s) Oral daily  aspirin enteric coated 81 milliGRAM(s) Oral daily  bisacodyl Suppository 10 milliGRAM(s) Rectal daily PRN  chlorhexidine 2% Cloths 1 Application(s) Topical daily  chlorhexidine 4% Liquid 1 Application(s) Topical <User Schedule>  heparin  Infusion 1200 Unit(s)/Hr IV Continuous <Continuous>  hydrALAZINE 100 milliGRAM(s) Oral every 8 hours  insulin lispro (ADMELOG) corrective regimen sliding scale   SubCutaneous at bedtime  insulin lispro (ADMELOG) corrective regimen sliding scale   SubCutaneous three times a day before meals  isosorbide   dinitrate Tablet (ISORDIL) 40 milliGRAM(s) Oral three times a day  lidocaine   4% Patch 1 Patch Transdermal daily  metoprolol succinate ER 25 milliGRAM(s) Oral daily  milrinone Infusion 0.5 MICROgram(s)/kG/Min IV Continuous <Continuous>  nitroprusside Infusion 0.25 MICROgram(s)/kG/Min IV Continuous <Continuous>  pantoprazole    Tablet 40 milliGRAM(s) Oral before breakfast  polyethylene glycol 3350 17 Gram(s) Oral daily  senna 1 Tablet(s) Oral at bedtime  simethicone 80 milliGRAM(s) Chew every 6 hours PRN  sodium chloride 0.65% Nasal 1 Spray(s) Both Nostrils every 6 hours PRN      Physical Exam:    Vitals:  Vital Signs Last 24 Hours  T(C): 37.2 (22 @ 03:00), Max: 37.2 (22 @ 10:00)  HR: 81 (22 @ 09:00) (76 - 118)  BP: --  RR: 21 (22 @ 09:00) (17 - 29)  SpO2: 93% (22 @ 09:00) (89% - 95%)    Weight in k.5 ( @ 05:00)    I&O's Summary    2022 07:01  -  2022 07:00  --------------------------------------------------------  IN: 1552.4 mL / OUT: 2655 mL / NET: -1102.6 mL    2022 07:01  -  2022 09:20  --------------------------------------------------------  IN: 360.7 mL / OUT: 200 mL / NET: 160.7 mL        Tele:    General: No distress. Comfortable.  HEENT: EOM intact.  Neck: Neck supple. JVP not elevated. No masses  Chest: Clear to auscultation bilaterally  CV: Normal S1 and S2. No murmurs, rub, or gallops. Radial pulses normal.  Abdomen: Soft, non-distended, non-tender  Skin: No rashes or skin breakdown  Neurology: Alert and oriented times three. Sensation intact  Psych: Affect normal    Labs:                        12.9   13.67 )-----------( 241      ( 2022 05:17 )             39.2     06-14    128<L>  |  95<L>  |  23  ----------------------------<  160<H>  4.7   |  19<L>  |  1.22    Ca    9.3      2022 05:17  Phos  3.7       Mg     1.8         TPro  6.9  /  Alb  3.1<L>  /  TBili  0.6  /  DBili  x   /  AST  49<H>  /  ALT  64<H>  /  AlkPhos  96  14    PT/INR - ( 2022 05:17 )   PT: 13.2 sec;   INR: 1.14 ratio         PTT - ( 2022 06:12 )  PTT:51.6 sec            Lactate, Blood: 1.4 mmol/L ( @ 05:17)  Lactate, Blood: 1.0 mmol/L ( @ 05:00)

## 2022-06-14 NOTE — PHYSICAL THERAPY INITIAL EVALUATION ADULT - ADDITIONAL COMMENTS
Pt lives w/ wife in a pvt house w/ 4 steps to enter. Pt able to stay on 1st floor. Pt lives w/ wife in a pvt house w/ 4 steps to enter. Pt able to stay on 1st floor.    67 y/o male w/ PMHx HTN, HLD, type 2 DM, chronic HFrEF (EF 25-30% by Echo), complete heart block s/p PPM (in 2006, upgraded to BiV-ICD in 09/2016), CAD (s/p recent BERNABE mid LAD at [facility] on 04/18/2022), and stage II CKD (baseline Cr ~1.3) presented with near syncope to OSH found to have 41 episodes of VT on device, s/p unsuccessful VT ablation and transferred to [facility] for management of cardiogenic shock and advanced therapy eval.

## 2022-06-14 NOTE — PROGRESS NOTE ADULT - SUBJECTIVE AND OBJECTIVE BOX
Authored by Sumi Pantoja MD, PGY2  PATIENT:  LENNOX GOCOOL  [id number]    CHIEF COMPLAINT:  Patient is a 66y old  Male who presents with a chief complaint of LVAD/OHT eval (2022 22:37)      INTERVAL HISTORY OVERNIGHT EVENTS: DEON overnight.         MEDICATIONS:  MEDICATIONS  (STANDING):  allopurinol 100 milliGRAM(s) Oral daily  aMIOdarone    Tablet 200 milliGRAM(s) Oral daily  aspirin enteric coated 81 milliGRAM(s) Oral daily  chlorhexidine 2% Cloths 1 Application(s) Topical daily  chlorhexidine 4% Liquid 1 Application(s) Topical <User Schedule>  heparin  Infusion 1200 Unit(s)/Hr (15 mL/Hr) IV Continuous <Continuous>  hydrALAZINE 100 milliGRAM(s) Oral every 8 hours  insulin lispro (ADMELOG) corrective regimen sliding scale   SubCutaneous at bedtime  insulin lispro (ADMELOG) corrective regimen sliding scale   SubCutaneous three times a day before meals  isosorbide   dinitrate Tablet (ISORDIL) 40 milliGRAM(s) Oral three times a day  lidocaine   4% Patch 1 Patch Transdermal daily  metoprolol succinate ER 25 milliGRAM(s) Oral daily  milrinone Infusion 0.5 MICROgram(s)/kG/Min (11 mL/Hr) IV Continuous <Continuous>  nitroprusside Infusion 0.25 MICROgram(s)/kG/Min (2.73 mL/Hr) IV Continuous <Continuous>  pantoprazole    Tablet 40 milliGRAM(s) Oral before breakfast  polyethylene glycol 3350 17 Gram(s) Oral daily  senna 1 Tablet(s) Oral at bedtime    MEDICATIONS  (PRN):  acetaminophen     Tablet .. 650 milliGRAM(s) Oral every 6 hours PRN Temp greater or equal to 38C (100.4F), Mild Pain (1 - 3)  bisacodyl Suppository 10 milliGRAM(s) Rectal daily PRN Constipation  simethicone 80 milliGRAM(s) Chew every 6 hours PRN Gas  sodium chloride 0.65% Nasal 1 Spray(s) Both Nostrils every 6 hours PRN Nasal Congestion      ALLERGIES:  Allergies    No Known Allergies    Intolerances        OBJECTIVE:  ICU Vital Signs Last 24 Hrs  T(C): 37.2 (2022 03:00), Max: 37.2 (2022 10:00)  T(F): 99 (2022 03:00), Max: 99 (2022 21:00)  HR: 88 (2022 06:00) (76 - 118)  BP: --  BP(mean): --  ABP: --  ABP(mean): --  RR: 29 (2022 06:00) (17 - 32)  SpO2: 93% (2022 06:00) (89% - 95%)        CAPILLARY BLOOD GLUCOSE      POCT Blood Glucose.: 183 mg/dL (2022 21:34)  POCT Blood Glucose.: 134 mg/dL (2022 17:13)  POCT Blood Glucose.: 127 mg/dL (2022 12:06)    CAPILLARY BLOOD GLUCOSE      POCT Blood Glucose.: 183 mg/dL (2022 21:34)    I&O's Summary    2022 07:01  -  2022 07:00  --------------------------------------------------------  IN: 1515.5 mL / OUT: 2655 mL / NET: -1139.5 mL      Daily     Daily Weight in k.5 (2022 05:00)    PHYSICAL EXAMINATION:  Appearance: Normal, NAD  HEAD:  Normocephalic  EYES:  PERRLA, conjunctiva and sclera clear  NECK: Supple, No JVD  CHEST/LUNG: Clear to auscultation bilaterally; No wheezing  HEART: Normal S1, S2. No murmurs, rubs, or gallops  ABDOMEN: + Bowel sounds, Soft, NT, ND   EXTREMITIES:  2+ Peripheral Pulses, No clubbing, cyanosis, or edema  NEUROLOGY: non-focal, aaox3  SKIN: No rashes or lesions        LABS:                          12.9   13.67 )-----------( 241      ( 2022 05:17 )             39.2     06-14    128<L>  |  95<L>  |  23  ----------------------------<  160<H>  4.7   |  19<L>  |  1.22    Ca    9.3      2022 05:17  Phos  3.7     06-14  Mg     1.8     -14    TPro  6.9  /  Alb  3.1<L>  /  TBili  0.6  /  DBili  x   /  AST  49<H>  /  ALT  64<H>  /  AlkPhos  96  -14    LIVER FUNCTIONS - ( 2022 05:17 )  Alb: 3.1 g/dL / Pro: 6.9 g/dL / ALK PHOS: 96 U/L / ALT: 64 U/L / AST: 49 U/L / GGT: x           PT/INR - ( 2022 05:17 )   PT: 13.2 sec;   INR: 1.14 ratio         PTT - ( 2022 06:12 )  PTT:51.6 sec            TELEMETRY:     EKG:     IMAGING:  < from: Xray Chest 1 View- PORTABLE-Routine (Xray Chest 1 View- PORTABLE-Routine in AM.) (22 @ 04:14) >  COMPARISON STUDY: 2022    Frontal expiratory view of the chest shows the heart to be similar in   size. IABP marker reaches the central aortic knob. Left cardiac   fibrillator is again noted.    The lungs show less pulmonary congestion and there is no evidence of   pneumothorax nor pleural effusion.    IMPRESSION:  Marker as noted.      Thank you for the courtesy of this referral.    --- End of Report ---    < end of copied text >       Authored by Sumi Pantoja MD, PGY2  PATIENT:  LENNOX GOCOOL  [id number]    CHIEF COMPLAINT:  Patient is a 66y old  Male who presents with a chief complaint of LVAD/OHT eval (2022 22:37)      INTERVAL HISTORY OVERNIGHT EVENTS: DEON overnight. Patient with no acute complaints this AM, exercising with PT this AM.         MEDICATIONS:  MEDICATIONS  (STANDING):  allopurinol 100 milliGRAM(s) Oral daily  aMIOdarone    Tablet 200 milliGRAM(s) Oral daily  aspirin enteric coated 81 milliGRAM(s) Oral daily  chlorhexidine 2% Cloths 1 Application(s) Topical daily  chlorhexidine 4% Liquid 1 Application(s) Topical <User Schedule>  heparin  Infusion 1200 Unit(s)/Hr (15 mL/Hr) IV Continuous <Continuous>  hydrALAZINE 100 milliGRAM(s) Oral every 8 hours  insulin lispro (ADMELOG) corrective regimen sliding scale   SubCutaneous at bedtime  insulin lispro (ADMELOG) corrective regimen sliding scale   SubCutaneous three times a day before meals  isosorbide   dinitrate Tablet (ISORDIL) 40 milliGRAM(s) Oral three times a day  lidocaine   4% Patch 1 Patch Transdermal daily  metoprolol succinate ER 25 milliGRAM(s) Oral daily  milrinone Infusion 0.5 MICROgram(s)/kG/Min (11 mL/Hr) IV Continuous <Continuous>  nitroprusside Infusion 0.25 MICROgram(s)/kG/Min (2.73 mL/Hr) IV Continuous <Continuous>  pantoprazole    Tablet 40 milliGRAM(s) Oral before breakfast  polyethylene glycol 3350 17 Gram(s) Oral daily  senna 1 Tablet(s) Oral at bedtime    MEDICATIONS  (PRN):  acetaminophen     Tablet .. 650 milliGRAM(s) Oral every 6 hours PRN Temp greater or equal to 38C (100.4F), Mild Pain (1 - 3)  bisacodyl Suppository 10 milliGRAM(s) Rectal daily PRN Constipation  simethicone 80 milliGRAM(s) Chew every 6 hours PRN Gas  sodium chloride 0.65% Nasal 1 Spray(s) Both Nostrils every 6 hours PRN Nasal Congestion      ALLERGIES:  Allergies    No Known Allergies    Intolerances        OBJECTIVE:  ICU Vital Signs Last 24 Hrs  T(C): 37.2 (2022 03:00), Max: 37.2 (2022 10:00)  T(F): 99 (2022 03:00), Max: 99 (2022 21:00)  HR: 88 (2022 06:00) (76 - 118)  BP: --  BP(mean): --  ABP: --  ABP(mean): --  RR: 29 (2022 06:00) (17 - 32)  SpO2: 93% (2022 06:00) (89% - 95%)        CAPILLARY BLOOD GLUCOSE      POCT Blood Glucose.: 183 mg/dL (2022 21:34)  POCT Blood Glucose.: 134 mg/dL (2022 17:13)  POCT Blood Glucose.: 127 mg/dL (2022 12:06)    CAPILLARY BLOOD GLUCOSE      POCT Blood Glucose.: 183 mg/dL (2022 21:34)    I&O's Summary    2022 07:01  -  2022 07:00  --------------------------------------------------------  IN: 1515.5 mL / OUT: 2655 mL / NET: -1139.5 mL      Daily     Daily Weight in k.5 (2022 05:00)    PHYSICAL EXAMINATION:  Appearance: Normal, NAD  HEAD:  Normocephalic  EYES:  PERRLA, conjunctiva and sclera clear  NECK: Supple, No JVD  CHEST/LUNG: Clear to auscultation bilaterally; No wheezing  HEART: Normal S1, S2. No murmurs, rubs, or gallops  ABDOMEN: + Bowel sounds, Soft, NT, ND   EXTREMITIES:  2+ Peripheral Pulses, No clubbing, cyanosis, or edema  NEUROLOGY: non-focal, aaox3  SKIN: No rashes or lesions        LABS:                          12.9   13.67 )-----------( 241      ( 2022 05:17 )             39.2     06-14    128<L>  |  95<L>  |  23  ----------------------------<  160<H>  4.7   |  19<L>  |  1.22    Ca    9.3      2022 05:17  Phos  3.7     -  Mg     1.8     -    TPro  6.9  /  Alb  3.1<L>  /  TBili  0.6  /  DBili  x   /  AST  49<H>  /  ALT  64<H>  /  AlkPhos  96  -14    LIVER FUNCTIONS - ( 2022 05:17 )  Alb: 3.1 g/dL / Pro: 6.9 g/dL / ALK PHOS: 96 U/L / ALT: 64 U/L / AST: 49 U/L / GGT: x           PT/INR - ( 2022 05:17 )   PT: 13.2 sec;   INR: 1.14 ratio         PTT - ( 2022 06:12 )  PTT:51.6 sec            TELEMETRY:     EKG:     IMAGING:  < from: Xray Chest 1 View- PORTABLE-Routine (Xray Chest 1 View- PORTABLE-Routine in AM.) (22 @ 04:14) >  COMPARISON STUDY: 2022    Frontal expiratory view of the chest shows the heart to be similar in   size. IABP marker reaches the central aortic knob. Left cardiac   fibrillator is again noted.    The lungs show less pulmonary congestion and there is no evidence of   pneumothorax nor pleural effusion.    IMPRESSION:  Marker as noted.      Thank you for the courtesy of this referral.    --- End of Report ---    < end of copied text >

## 2022-06-14 NOTE — PHYSICAL THERAPY INITIAL EVALUATION ADULT - GAIT DEVIATIONS NOTED, PT EVAL
decreased sandy/increased time in double stance/decreased velocity of limb motion/decreased stride length/decreased weight-shifting ability

## 2022-06-14 NOTE — PHYSICAL THERAPY INITIAL EVALUATION ADULT - GENERAL OBSERVATIONS, REHAB EVAL
Lying in bed + IABP R Groin Pt found in chair +HFNC +external pacer +tele +swan alexander +3 chest tubes +block.

## 2022-06-14 NOTE — PROGRESS NOTE ADULT - SUBJECTIVE AND OBJECTIVE BOX
GOCOOL, LENNOX  MRN-[id number]  Patient is a 66y old  Male who presents with a chief complaint of LVAD/OHT eval (14 Jun 2022 09:19)    HPI:  66M Mixed Ischemic/NICM Cardiomyopathy (EF 25-30% at baseline, s/p BIV-ICD), CAD (medically managed MIs in 2008,2011, Most recent stent in April 2022 to mLAD) presented with multiple near syncope, found to have 41 episodes of VT and admitted initially to cardiac telemetry for further evaluation/management. Ischemic evaluation without new disease and VT ablation without substrate to complete ablation.   Transferred from [facility] for further management and evaluation for LVAD vs OHT.    (26 May 2022 20:31)      Hospital Course:  5/26 Transferred to CCU for further management    24 HOUR EVENTS:    REVIEW OF SYSTEMS:    CONSTITUTIONAL: No weakness, fevers or chills  EYES/ENT: No visual changes;  No vertigo or throat pain   NECK: No pain or stiffness  RESPIRATORY: No cough, wheezing, hemoptysis; No shortness of breath  CARDIOVASCULAR: No chest pain or palpitations  GASTROINTESTINAL: No abdominal or epigastric pain. No nausea, vomiting, or hematemesis; No diarrhea or constipation. No melena or hematochezia.  GENITOURINARY: No dysuria, frequency or hematuria  NEUROLOGICAL: No numbness or weakness  SKIN: No itching, rashes      ICU Vital Signs Last 24 Hrs  T(C): 37.6 (14 Jun 2022 20:00), Max: 37.6 (14 Jun 2022 20:00)  T(F): 99.7 (14 Jun 2022 20:00), Max: 99.7 (14 Jun 2022 20:00)  HR: 80 (14 Jun 2022 22:00) (77 - 118)  BP: --  BP(mean): --  ABP: --  ABP(mean): --  RR: 29 (14 Jun 2022 22:00) (19 - 29)  SpO2: 93% (14 Jun 2022 22:00) (89% - 95%)         I&O's Summary    13 Jun 2022 07:01  -  14 Jun 2022 07:00  --------------------------------------------------------  IN: 1552.4 mL / OUT: 2655 mL / NET: -1102.6 mL    14 Jun 2022 07:01  -  14 Jun 2022 23:12  --------------------------------------------------------  IN: 1303.7 mL / OUT: 1500 mL / NET: -196.3 mL      CAPILLARY BLOOD GLUCOSE    POCT Blood Glucose.: 172 mg/dL (14 Jun 2022 21:53)    PHYSICAL EXAM:  GENERAL: No acute distress, well-developed  HEAD:  Atraumatic, Normocephalic  EYES: EOMI, PERRLA, conjunctiva and sclera clear  NECK: Supple, no lymphadenopathy, no JVD  CHEST/LUNG: CTAB; No wheezes, rales, or rhonchi  HEART: Regular rate and rhythm. Normal S1/S2. No murmurs, rubs, or gallops  ABDOMEN: Soft, non-tender, non-distended; normal bowel sounds, no organomegaly  EXTREMITIES:  2+ peripheral pulses b/l, No clubbing, cyanosis, or edema  NEUROLOGY: A&O x 3, no focal deficits  SKIN: No rashes or lesions    ============================I/O===========================   I&O's Detail    13 Jun 2022 07:01  -  14 Jun 2022 07:00  --------------------------------------------------------  IN:    Heparin: 330 mL    Milrinone: 239.8 mL    Nitroprusside: 322.6 mL    Oral Fluid: 660 mL  Total IN: 1552.4 mL    OUT:    Voided (mL): 2655 mL  Total OUT: 2655 mL    Total NET: -1102.6 mL      14 Jun 2022 07:01 - 14 Jun 2022 23:12  --------------------------------------------------------  IN:    Heparin: 180 mL    Milrinone: 131.7 mL    Nitroprusside: 132 mL    Oral Fluid: 860 mL  Total IN: 1303.7 mL    OUT:    Voided (mL): 1500 mL  Total OUT: 1500 mL    Total NET: -196.3 mL        ============================ LABS =========================                        12.9   13.67 )-----------( 241      ( 14 Jun 2022 05:17 )             39.2     06-14    128<L>  |  95<L>  |  23  ----------------------------<  160<H>  4.7   |  19<L>  |  1.22    Ca    9.3      14 Jun 2022 05:17  Phos  3.7     06-14  Mg     1.8     06-14    TPro  6.9  /  Alb  3.1<L>  /  TBili  0.6  /  DBili  x   /  AST  49<H>  /  ALT  64<H>  /  AlkPhos  96  06-14      LIVER FUNCTIONS - ( 14 Jun 2022 05:17 )  Alb: 3.1 g/dL / Pro: 6.9 g/dL / ALK PHOS: 96 U/L / ALT: 64 U/L / AST: 49 U/L / GGT: x           PT/INR - ( 14 Jun 2022 05:17 )   PT: 13.2 sec;   INR: 1.14 ratio         PTT - ( 14 Jun 2022 06:12 )  PTT:51.6 sec    Lactate, Blood: 1.4 mmol/L (06-14-22 @ 05:17)  Lactate, Blood: 1.0 mmol/L (06-13-22 @ 05:00)  Blood Gas Venous - Lactate: 1.0 mmol/L (06-13-22 @ 04:45)    ======================Micro/Rad/Cardio=================  Telemetry: Reviewed   EKG: Reviewed  CXR: Reviewed  Culture: Reviewed   ======================================================  PAST MEDICAL & SURGICAL HISTORY:  AV block    Essential hypertension    Chronic HFrEF (heart failure with reduced ejection fraction)    Chronic kidney disease, unspecified CKD stage    CAD (coronary artery disease)    HLD (hyperlipidemia)    Type 2 diabetes mellitus    Artificial cardiac pacemaker      ====================ASSESSMENT ==============  65 y/o male w/ PMHx HTN, HLD, type 2 DM, chronic HFrEF (EF 25-30% by Echo), complete heart block s/p PPM (in 2006, upgraded to BiV-ICD in 09/2016), CAD (s/p recent BERNABE mid LAD at [facility] on 04/18/2022), and stage II CKD (baseline Cr ~1.3) presented with near syncope to OSH found to have 41 episodes of VT on device, s/p unsuccessful VT ablation and transferred to [facility] for management of cardiogenic shock and advanced therapy eval.       Plan:  ====================== NEUROLOGY=====================   AAOx3  - No active issues  - Tylenol PRN for fevers/pain    acetaminophen     Tablet .. 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  - bedside nipride study done 5/29 with reduction in PVR to <2    ====================CARDIOVASCULAR==================  Cardiogenic shock  - Maintain IABP 1:1,monitor PTT on heparin gtt  - Maintain Milrinone 0.5mcg/kg/min and nitro 0.25mcg/kg/min   - Cont hydral 100 TID and ISDN 40TID for AL Reduction, Assisted mean goal 70-80   - monitor hemodynamics and perfusion indices  - HF following, listed for OHT Status 2E    Relevant studies:  - TTE 5/21: LV 5.2 cm, LVEF 10-15%, LVOT VTI 10 cm, moderate RV dysfunction, mild AI, minimal MR  - TTE on 5/27: EF 15% Severe global LV systolic dysfunction. RV enlargement with decreased RV systolic function.  - LHC 5/23: patent mLAD stent with slow flow, D1 with 40-50% stenosis  - RHC 5/26: RA 12, PA 70/40 (50), PCWP 22, Milana CO/CI 2.3/1.3, MAP 83 with SVR 2469, PVR 12 PERSAUD  - 5/27: RA 10, PA 76/35 (49), PCWP 34, PA sat 57% with Milana CO/CI 3.1/1.6, MAP 71 with SVR 1574, PVR 4.8  - 6/6 RHC: CVP 17, RV 75/4, PA 80/36, PCW 22, CI 1.88. IABP placed and pt transferred to CICU.      VTach   - s/p EPS on 5/24, unable to perform ablation as no substrate found and did not induce VT because of severely low EF   - Interrogation of ICD @Dr Wilson's office 5/20: back-to-back episodes of VT @ 200+ bpm all terminating with ATP. Since last cath pt has had 41 VT episodes (all falling into VF zone)  - Normal device function. BIV pacing 97%. No programming changes made  - c/w Amiodarone 200 QD  - c/w Toprol 25 daily  - No further VT on inotropic support  - keep K 4-4.5    CAD   - Chest pain free and compliant with DAPT since last PCI 4/18/22 per discussion with interventional at OSH, ok to DC plavix pending cardiac surgery, last dose 5/28  - Cardiac cath @[facility] 4/18/22: mLAD 90% s/p BERNABE, LCx mild disease, RCA mild disease s/p diagnostic cardiac cath w/ Dr Wagner on 05/23/2022   - C/w ASA, d/c lipitor for elevated LFTs, pending transplant    aspirin enteric coated 81 milliGRAM(s) Oral daily  aMIOdarone    Tablet 200 milliGRAM(s) Oral daily  hydrALAZINE 100 milliGRAM(s) Oral every 8 hours  isosorbide   dinitrate Tablet (ISORDIL) 40 milliGRAM(s) Oral three times a day  metoprolol succinate ER 25 milliGRAM(s) Oral daily  milrinone Infusion 0.5 MICROgram(s)/kG/Min (11 mL/Hr) IV Continuous <Continuous>  nitroprusside Infusion 0.25 MICROgram(s)/kG/Min (2.73 mL/Hr) IV Continuous <Continuous>    ===================HEMATOLOGIC/ONC ===================  ?Secondary Polycythemia Vera   - elevated EPO, hgb in normal range  - Heme following peripherally  - f/u MELINA-2     AC therapy   - Heparin gtt for IABP, low ptt goal    heparin  Infusion 1200 Unit(s)/Hr (15 mL/Hr) IV Continuous <Continuous>    ===================== RENAL =========================  JAGRUTI on CKD II  Admitted w/ Cr 2.01 (baseline Cr ~1.3) now rising with inotropic support; 1.14 ->1.22  - likely i/s/o cardiogenic shock given RHC findings of CI 1.88  - Avoid nephrotoxic agents, NSAIDs. Renally dose meds.  - monitor strict I/Os and continue current cardiac support  - Continue monitoring urine output    ==================== GASTROINTESTINAL===================  Elevated Liver Enzymes:  -elevated AST/ALT, normal bilirubin, alk phos. statin d/c 6/13. ? related to amiodarone toxicity,    - Abd US on 6/14: Contracted gallbladder in the setting of recent meal. No acute cholecystitis.    Diet:   - Tolerating PO diet, Constant carb    Constipation:  - Cont. bowel regimen Miralax, Senna, PRN simethicone and bisacodyl   - s/p colonoscopy 6/3 with 5 polyps removed and biopsied, benign findings- GI recs for 3 yr follow up colo  - Protonix for GI prophylaxis    bisacodyl Suppository 10 milliGRAM(s) Rectal daily PRN Constipation  pantoprazole    Tablet 40 milliGRAM(s) Oral before breakfast  polyethylene glycol 3350 17 Gram(s) Oral daily  senna 1 Tablet(s) Oral at bedtime  simethicone 80 milliGRAM(s) Chew every 6 hours PRN Gas    =======================    ENDOCRINE  =====================  T2DM (A1C 6.2 on admission)  - Cont. ISS, glucose controlled, monitor FS closely     Gout flare, Left knee (previously R)  - continue allopurinol, s/p prednisone    allopurinol 100 milliGRAM(s) Oral daily  insulin lispro (ADMELOG) corrective regimen sliding scale   SubCutaneous at bedtime  insulin lispro (ADMELOG) corrective regimen sliding scale   SubCutaneous three times a day before meals    ========================INFECTIOUS DISEASE================  RUE Thrombophlebitis  - RUE US Duplex showing superficial thrombus  - s/p course of ancef    Febrile 6/7-8 overnight x1   - Temp 99.7 F  - UA neg, BCx on 6/7 NGTD x2, RVP neg   - follow fever curve, WBC       Patient requires continuous monitoring with bedside rhythm monitoring, pulse ox monitoring, and intermittent blood gas analysis. Care plan discussed with ICU care team. Patient remained critical and at risk for life threatening decompensation.  Patient seen, examined and plan discussed with CCU team during rounds.     I have personally provided _____ minutes of critical care time excluding time spent on separate procedures, in addition to initial critical care time provided by the CICU Attending, Dr. Blandon.  By signing my name below, I, Disha May, attest that this documentation has been prepared under the direction and in the presence of Alisia Manriquez NP   Electronically signed: Mini Love, 06-14-22 @ 23:12    I, Alisia Manriquez, personally performed the services described in this documentation. all medical record entries made by the scribe were at my direction and in my presence. I have reviewed the chart and agree that the record reflects my personal performance and is accurate and complete  Electronically signed: Alisia Manriquez NP

## 2022-06-14 NOTE — PROGRESS NOTE ADULT - SUBJECTIVE AND OBJECTIVE BOX
GOCOOL, LENNOX  MRN-[id number]  Patient is a 66y old  Male who presents with a chief complaint of LVAD/OHT eval (13 Jun 2022 22:37)    HPI:  66M Mixed Ischemic/NICM Cardiomyopathy (EF 25-30% at baseline, s/p BIV-ICD), CAD (medically managed MIs in 2008,2011, Most recent stent in April 2022 to mLAD) presented with multiple near syncope, found to have 41 episodes of VT and admitted initially to cardiac telemetry for further evaluation/management. Ischemic evaluation without new disease and VT ablation without substrate to complete ablation.   Transferred from [facility] for further management and evaluation for LVAD vs OHT.  (26 May 2022 20:31)    Overnight events:    REVIEW OF SYSTEMS:    CONSTITUTIONAL: No weakness, fevers or chills  EYES/ENT: No visual changes;  No vertigo or throat pain   NECK: No pain or stiffness  RESPIRATORY: No cough, wheezing, hemoptysis; No shortness of breath  CARDIOVASCULAR: No chest pain or palpitations  GASTROINTESTINAL: No abdominal or epigastric pain. No nausea, vomiting, or hematemesis; No diarrhea or constipation. No melena or hematochezia.  GENITOURINARY: No dysuria, frequency or hematuria  NEUROLOGICAL: No numbness or weakness  SKIN: No itching, rashes      Physical Exam:  Vital Signs Last 24 Hrs  T(C): 37.2 (14 Jun 2022 03:00), Max: 37.2 (13 Jun 2022 10:00)  T(F): 99 (14 Jun 2022 03:00), Max: 99 (13 Jun 2022 21:00)  HR: 88 (14 Jun 2022 06:00) (76 - 118)  BP: --  BP(mean): --  RR: 29 (14 Jun 2022 06:00) (17 - 32)  SpO2: 93% (14 Jun 2022 06:00) (89% - 95%)      Physical Exam:   Constitutional: NAD, well-groomed, well-developed  HEENT: PERRLA, EOMI, no drainage or redness  Neck: supple,  No JVD  Respiratory: Breath Sounds equal & clear bilaterally to auscultation, no rales/rhonchi/wheezing, no accessory muscle use noted  Cardiovascular: Regular rate, regular rhythm, normal S1, S2; no murmurs or rub  Gastrointestinal: Soft, non-tender, non distended, + bowel sounds  Extremities: BRADFORD x 4, no peripheral edema, no cyanosis, no clubbing   Neurological: A+O x 3; speech clear and intact; no sensory, motor  deficits, normal reflexes  Skin: warm, dry, well perfused  Incisions: R femoral: c/d/i w/o erythema, bleeding or tenderness    ============================I/O===========================   I&O's Detail    12 Jun 2022 07:01  -  13 Jun 2022 07:00  --------------------------------------------------------  IN:    Heparin: 360 mL    Milrinone: 261.6 mL    Oral Fluid: 660 mL  Total IN: 1281.6 mL    OUT:    Voided (mL): 3160 mL  Total OUT: 3160 mL    Total NET: -1878.4 mL      13 Jun 2022 07:01  -  14 Jun 2022 06:36  --------------------------------------------------------  IN:    Heparin: 315 mL    Milrinone: 228.9 mL    Nitroprusside: 311.6 mL    Oral Fluid: 660 mL  Total IN: 1515.5 mL    OUT:    Voided (mL): 2655 mL  Total OUT: 2655 mL    Total NET: -1139.5 mL        ============================ LABS =========================                        12.9   13.67 )-----------( 241      ( 14 Jun 2022 05:17 )             39.2     06-14    128<L>  |  95<L>  |  23  ----------------------------<  160<H>  4.7   |  19<L>  |  1.22    Ca    9.3      14 Jun 2022 05:17  Phos  3.7     06-14  Mg     1.8     06-14    TPro  6.9  /  Alb  3.1<L>  /  TBili  0.6  /  DBili  x   /  AST  49<H>  /  ALT  64<H>  /  AlkPhos  96  06-14    LIVER FUNCTIONS - ( 14 Jun 2022 05:17 )  Alb: 3.1 g/dL / Pro: 6.9 g/dL / ALK PHOS: 96 U/L / ALT: 64 U/L / AST: 49 U/L / GGT: x           PT/INR - ( 14 Jun 2022 05:17 )   PT: 13.2 sec;   INR: 1.14 ratio         PTT - ( 14 Jun 2022 06:12 )  PTT:51.6 sec      ======================Micro/Rad/Cardio=================  Culture: Reviewed   CXR: Reviewed  Echo:Reviewed  ======================================================  PAST MEDICAL & SURGICAL HISTORY:  AV block  Essential hypertension  Chronic HFrEF (heart failure with reduced ejection fraction)  Chronic kidney disease, unspecified CKD stage  CAD (coronary artery disease)  HLD (hyperlipidemia)  Type 2 diabetes mellitus  Artificial cardiac pacemaker      ====================ASSESSMENT ==============  65 y/o male w/ PMHx HTN, HLD, type 2 DM, chronic HFrEF (EF 25-30% by Echo), complete heart block s/p PPM (in 2006, upgraded to BiV-ICD in 09/2016), CAD (s/p recent BERNABE mid LAD at [facility] on 04/18/2022), and stage II CKD (baseline Cr ~1.3) presented with near syncope to OSH found to have 41 episodes of VT on device, s/p unsuccessful VT ablation and transferred to [facility] for management of cardiogenic shock and advanced therapy eval.     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  - bedside nipride study done 5/29 with reduction in PVR to <2    ====================CARDIOVASCULAR==================  Cardiogenic shock  - Maintain IABP 1:1,monitor PTT on heparin gtt  - Maintain Milrinone 0.5mcg/kg/min   - Cont hydral 100 TID and ISDN 40TID for AL Reduction, Assisted mean goal 70-80   - monitor hemodynamics and perfusion indices  - HF following, listed for OHT Status 2E  - 6/13: HF recommended starting nipride, patient already on isordil    Relevant studies:  - TTE 5/21: LV 5.2 cm, LVEF 10-15%, LVOT VTI 10 cm, moderate RV dysfunction, mild AI, minimal MR  - TTE on 5/27: EF 15% Severe global LV systolic dysfunction. RV enlargement with decreased RV systolic function.  - LHC 5/23: patent mLAD stent with slow flow, D1 with 40-50% stenosis  - RHC 5/26: RA 12, PA 70/40 (50), PCWP 22, Milana CO/CI 2.3/1.3, MAP 83 with SVR 2469, PVR 12 PERSAUD  - 5/27: RA 10, PA 76/35 (49), PCWP 34, PA sat 57% with Milana CO/CI 3.1/1.6, MAP 71 with SVR 1574, PVR 4.8  - 6/6 RHC: CVP 17, RV 75/4, PA 80/36, PCW 22, CI 1.88. IABP placed and pt transferred to CICU.      VTach   - s/p EPS on 5/24, unable to perform ablation as no substrate found and did not induce VT because of severely low EF   - Interrogation of ICD @Dr Wilson's office 5/20: back-to-back episodes of VT @ 200+ bpm all terminating with ATP. Since last cath pt has had 41 VT episodes (all falling into VF zone)  - Normal device function. BIV pacing 97%. No programming changes made  - c/w Amiodarone 200 QD  - c/w Toprol 25 daily  - No further VT on inotropic support  - keep K 4-4.5    CAD   - Chest pain free and compliant with DAPT since last PCI 4/18/22 per discussion with interventional at OSH, ok to DC plavix pending cardiac surgery, last dose 5/28  - Cardiac cath @[facility] 4/18/22: mLAD 90% s/p BERNABE, LCx mild disease, RCA mild disease s/p diagnostic cardiac cath w/ Dr Wagner on 05/23/2022   - Cont ASA, d/c lipitor for elevated LFTs, pending transplant    ===================HEMATOLOGIC/ONC ===================  ?Secondary Polycythemia Vera   - elevated EPO, hgb in normal range  - Heme following peripherally  - f/u MELINA-2     AC therapy   - Heparin gtt for IABP, low ptt goal      ===================== RENAL =========================  JAGRUTI on CKD II  Admitted w/ Cr 2.01 (baseline Cr ~1.3) now rising with inotropic support; 1.10->1.13->1.14  - likely i/s/o cardiogenic shock given RHC findings of CI 1.88  - Avoid nephrotoxic agents, NSAIDs. Renally dose meds.  - monitor strict I/Os and continue current cardiac support  - Continue monitoring urine output    ==================== GASTROINTESTINAL===================  Diet:   - Tolerating PO diet    Constipation:  - Cont. bowel regimen Miralax, Senna, PRN simethicone and bisacodyl   - s/p colonoscopy 6/3 with 5 polyps removed and biopsied, benign findings- GI recs for 3 yr follow up colo      =======================    ENDOCRINE  =====================  T2DM (A1C 6.2 on admission)  - Cont. ISS, glucose controlled, monitor FS closely     Gout flare, Left knee (previously R)  - continue allopurinol, s/p prednisone      ========================INFECTIOUS DISEASE================  RUE Thrombophlebitis  - RUE US Duplex showing superficial thrombus  - s/p course of ancef    Febrile 6/7-8 overnight x1   - UA neg, BCx on 6/7 NGTD x2, RVP neg   - follow fever curve, WBC     Patient requires continuous monitoring with bedside rhythm monitoring, arterial line, pulse oximetry, ventilator monitoring and intermittent blood gas analysis.  Care plan discussed with ICU care team.  Patient remained critical; required more than usual post op care; I have spent 35 minutes providing non-routine post op care, revaluated multiple times during the day.

## 2022-06-14 NOTE — PHYSICAL THERAPY INITIAL EVALUATION ADULT - RANGE OF MOTION EXAMINATION, REHAB EVAL
RLE not assessed 2/2 IABP/bilateral upper extremity ROM was WFL (within functional limits)/bilateral lower extremity ROM was WFL (within functional limits)

## 2022-06-15 LAB
ALBUMIN SERPL ELPH-MCNC: 3 G/DL — LOW (ref 3.3–5)
ALBUMIN SERPL ELPH-MCNC: 3.3 G/DL — SIGNIFICANT CHANGE UP (ref 3.3–5)
ALP SERPL-CCNC: 87 U/L — SIGNIFICANT CHANGE UP (ref 40–120)
ALP SERPL-CCNC: 94 U/L — SIGNIFICANT CHANGE UP (ref 40–120)
ALT FLD-CCNC: 55 U/L — HIGH (ref 10–45)
ALT FLD-CCNC: 55 U/L — HIGH (ref 10–45)
ANION GAP SERPL CALC-SCNC: 11 MMOL/L — SIGNIFICANT CHANGE UP (ref 5–17)
ANION GAP SERPL CALC-SCNC: 13 MMOL/L — SIGNIFICANT CHANGE UP (ref 5–17)
APTT BLD: 51.1 SEC — HIGH (ref 27.5–35.5)
AST SERPL-CCNC: 36 U/L — SIGNIFICANT CHANGE UP (ref 10–40)
AST SERPL-CCNC: 37 U/L — SIGNIFICANT CHANGE UP (ref 10–40)
BASOPHILS # BLD AUTO: 0.09 K/UL — SIGNIFICANT CHANGE UP (ref 0–0.2)
BASOPHILS NFR BLD AUTO: 0.8 % — SIGNIFICANT CHANGE UP (ref 0–2)
BILIRUB SERPL-MCNC: 0.5 MG/DL — SIGNIFICANT CHANGE UP (ref 0.2–1.2)
BILIRUB SERPL-MCNC: 0.6 MG/DL — SIGNIFICANT CHANGE UP (ref 0.2–1.2)
BUN SERPL-MCNC: 24 MG/DL — HIGH (ref 7–23)
BUN SERPL-MCNC: 26 MG/DL — HIGH (ref 7–23)
CALCIUM SERPL-MCNC: 9 MG/DL — SIGNIFICANT CHANGE UP (ref 8.4–10.5)
CALCIUM SERPL-MCNC: 9.1 MG/DL — SIGNIFICANT CHANGE UP (ref 8.4–10.5)
CHLORIDE SERPL-SCNC: 96 MMOL/L — SIGNIFICANT CHANGE UP (ref 96–108)
CHLORIDE SERPL-SCNC: 96 MMOL/L — SIGNIFICANT CHANGE UP (ref 96–108)
CO2 SERPL-SCNC: 20 MMOL/L — LOW (ref 22–31)
CO2 SERPL-SCNC: 21 MMOL/L — LOW (ref 22–31)
CREAT SERPL-MCNC: 1.27 MG/DL — SIGNIFICANT CHANGE UP (ref 0.5–1.3)
CREAT SERPL-MCNC: 1.44 MG/DL — HIGH (ref 0.5–1.3)
EGFR: 54 ML/MIN/1.73M2 — LOW
EGFR: 63 ML/MIN/1.73M2 — SIGNIFICANT CHANGE UP
EOSINOPHIL # BLD AUTO: 0.53 K/UL — HIGH (ref 0–0.5)
EOSINOPHIL NFR BLD AUTO: 4.9 % — SIGNIFICANT CHANGE UP (ref 0–6)
EXTRACTION: SIGNIFICANT CHANGE UP
GLUCOSE BLDC GLUCOMTR-MCNC: 126 MG/DL — HIGH (ref 70–99)
GLUCOSE BLDC GLUCOMTR-MCNC: 131 MG/DL — HIGH (ref 70–99)
GLUCOSE BLDC GLUCOMTR-MCNC: 137 MG/DL — HIGH (ref 70–99)
GLUCOSE BLDC GLUCOMTR-MCNC: 161 MG/DL — HIGH (ref 70–99)
GLUCOSE SERPL-MCNC: 133 MG/DL — HIGH (ref 70–99)
GLUCOSE SERPL-MCNC: 135 MG/DL — HIGH (ref 70–99)
HCT VFR BLD CALC: 37.3 % — LOW (ref 39–50)
HGB BLD-MCNC: 12.4 G/DL — LOW (ref 13–17)
IMM GRANULOCYTES NFR BLD AUTO: 0.5 % — SIGNIFICANT CHANGE UP (ref 0–1.5)
INR BLD: 1.19 RATIO — HIGH (ref 0.88–1.16)
JAK2 EXON 13 MUT ANL BLD/T: SIGNIFICANT CHANGE UP
JAK2 EXONS 12-15 PCR: SIGNIFICANT CHANGE UP
LACTATE SERPL-SCNC: 1.1 MMOL/L — SIGNIFICANT CHANGE UP (ref 0.7–2)
LYMPHOCYTES # BLD AUTO: 19.8 % — SIGNIFICANT CHANGE UP (ref 13–44)
LYMPHOCYTES # BLD AUTO: 2.16 K/UL — SIGNIFICANT CHANGE UP (ref 1–3.3)
Lab: SIGNIFICANT CHANGE UP
MAGNESIUM SERPL-MCNC: 2 MG/DL — SIGNIFICANT CHANGE UP (ref 1.6–2.6)
MCHC RBC-ENTMCNC: 29.3 PG — SIGNIFICANT CHANGE UP (ref 27–34)
MCHC RBC-ENTMCNC: 33.2 GM/DL — SIGNIFICANT CHANGE UP (ref 32–36)
MCV RBC AUTO: 88.2 FL — SIGNIFICANT CHANGE UP (ref 80–100)
METHOD:: SIGNIFICANT CHANGE UP
MONOCYTES # BLD AUTO: 1.13 K/UL — HIGH (ref 0–0.9)
MONOCYTES NFR BLD AUTO: 10.4 % — SIGNIFICANT CHANGE UP (ref 2–14)
NEUTROPHILS # BLD AUTO: 6.94 K/UL — SIGNIFICANT CHANGE UP (ref 1.8–7.4)
NEUTROPHILS NFR BLD AUTO: 63.6 % — SIGNIFICANT CHANGE UP (ref 43–77)
NRBC # BLD: 0 /100 WBCS — SIGNIFICANT CHANGE UP (ref 0–0)
PHOSPHATE SERPL-MCNC: 3.7 MG/DL — SIGNIFICANT CHANGE UP (ref 2.5–4.5)
PLATELET # BLD AUTO: 216 K/UL — SIGNIFICANT CHANGE UP (ref 150–400)
POTASSIUM SERPL-MCNC: 4.6 MMOL/L — SIGNIFICANT CHANGE UP (ref 3.5–5.3)
POTASSIUM SERPL-MCNC: 4.8 MMOL/L — SIGNIFICANT CHANGE UP (ref 3.5–5.3)
POTASSIUM SERPL-SCNC: 4.6 MMOL/L — SIGNIFICANT CHANGE UP (ref 3.5–5.3)
POTASSIUM SERPL-SCNC: 4.8 MMOL/L — SIGNIFICANT CHANGE UP (ref 3.5–5.3)
PROT SERPL-MCNC: 6.6 G/DL — SIGNIFICANT CHANGE UP (ref 6–8.3)
PROT SERPL-MCNC: 6.9 G/DL — SIGNIFICANT CHANGE UP (ref 6–8.3)
PROTHROM AB SERPL-ACNC: 13.7 SEC — HIGH (ref 10.5–13.4)
RBC # BLD: 4.23 M/UL — SIGNIFICANT CHANGE UP (ref 4.2–5.8)
RBC # FLD: 15.2 % — HIGH (ref 10.3–14.5)
REFERENCES: SIGNIFICANT CHANGE UP
REFLEX:: SIGNIFICANT CHANGE UP
SODIUM SERPL-SCNC: 128 MMOL/L — LOW (ref 135–145)
SODIUM SERPL-SCNC: 129 MMOL/L — LOW (ref 135–145)
WBC # BLD: 10.91 K/UL — HIGH (ref 3.8–10.5)
WBC # FLD AUTO: 10.91 K/UL — HIGH (ref 3.8–10.5)

## 2022-06-15 PROCEDURE — 99291 CRITICAL CARE FIRST HOUR: CPT

## 2022-06-15 PROCEDURE — 93010 ELECTROCARDIOGRAM REPORT: CPT

## 2022-06-15 PROCEDURE — 71045 X-RAY EXAM CHEST 1 VIEW: CPT | Mod: 26

## 2022-06-15 PROCEDURE — 99292 CRITICAL CARE ADDL 30 MIN: CPT

## 2022-06-15 PROCEDURE — 99221 1ST HOSP IP/OBS SF/LOW 40: CPT

## 2022-06-15 PROCEDURE — 73110 X-RAY EXAM OF WRIST: CPT | Mod: 26,LT

## 2022-06-15 RX ORDER — ACETAMINOPHEN 500 MG
1000 TABLET ORAL ONCE
Refills: 0 | Status: COMPLETED | OUTPATIENT
Start: 2022-06-15 | End: 2022-06-15

## 2022-06-15 RX ORDER — ALBUMIN HUMAN 25 %
250 VIAL (ML) INTRAVENOUS ONCE
Refills: 0 | Status: COMPLETED | OUTPATIENT
Start: 2022-06-15 | End: 2022-06-15

## 2022-06-15 RX ADMIN — Medication 100 MILLIGRAM(S): at 13:42

## 2022-06-15 RX ADMIN — LIDOCAINE 1 PATCH: 4 CREAM TOPICAL at 00:00

## 2022-06-15 RX ADMIN — Medication 100 MILLIGRAM(S): at 05:10

## 2022-06-15 RX ADMIN — MILRINONE LACTATE 11 MICROGRAM(S)/KG/MIN: 1 INJECTION, SOLUTION INTRAVENOUS at 12:18

## 2022-06-15 RX ADMIN — Medication 400 MILLIGRAM(S): at 03:07

## 2022-06-15 RX ADMIN — ISOSORBIDE DINITRATE 40 MILLIGRAM(S): 5 TABLET ORAL at 13:42

## 2022-06-15 RX ADMIN — ISOSORBIDE DINITRATE 40 MILLIGRAM(S): 5 TABLET ORAL at 05:10

## 2022-06-15 RX ADMIN — Medication 100 MILLIGRAM(S): at 22:13

## 2022-06-15 RX ADMIN — Medication 125 MILLILITER(S): at 16:42

## 2022-06-15 RX ADMIN — ISOSORBIDE DINITRATE 40 MILLIGRAM(S): 5 TABLET ORAL at 22:14

## 2022-06-15 RX ADMIN — AMIODARONE HYDROCHLORIDE 200 MILLIGRAM(S): 400 TABLET ORAL at 05:11

## 2022-06-15 RX ADMIN — PANTOPRAZOLE SODIUM 40 MILLIGRAM(S): 20 TABLET, DELAYED RELEASE ORAL at 05:10

## 2022-06-15 RX ADMIN — MILRINONE LACTATE 11 MICROGRAM(S)/KG/MIN: 1 INJECTION, SOLUTION INTRAVENOUS at 09:53

## 2022-06-15 RX ADMIN — Medication 100 MILLIGRAM(S): at 12:18

## 2022-06-15 RX ADMIN — Medication 81 MILLIGRAM(S): at 12:18

## 2022-06-15 RX ADMIN — SODIUM NITROPRUSSIDE 2.73 MICROGRAM(S)/KG/MIN: 50 INJECTION INTRAVENOUS at 18:17

## 2022-06-15 RX ADMIN — SODIUM NITROPRUSSIDE 2.73 MICROGRAM(S)/KG/MIN: 50 INJECTION INTRAVENOUS at 09:54

## 2022-06-15 RX ADMIN — Medication 650 MILLIGRAM(S): at 00:37

## 2022-06-15 RX ADMIN — Medication 25 MILLIGRAM(S): at 05:11

## 2022-06-15 RX ADMIN — POLYETHYLENE GLYCOL 3350 17 GRAM(S): 17 POWDER, FOR SOLUTION ORAL at 12:18

## 2022-06-15 RX ADMIN — CHLORHEXIDINE GLUCONATE 1 APPLICATION(S): 213 SOLUTION TOPICAL at 05:11

## 2022-06-15 RX ADMIN — CHLORHEXIDINE GLUCONATE 1 APPLICATION(S): 213 SOLUTION TOPICAL at 22:14

## 2022-06-15 RX ADMIN — Medication 1000 MILLIGRAM(S): at 04:07

## 2022-06-15 NOTE — PROGRESS NOTE ADULT - SUBJECTIVE AND OBJECTIVE BOX
Subjective:    Medications:  acetaminophen     Tablet .. 650 milliGRAM(s) Oral every 6 hours PRN  allopurinol 100 milliGRAM(s) Oral daily  aMIOdarone    Tablet 200 milliGRAM(s) Oral daily  aspirin enteric coated 81 milliGRAM(s) Oral daily  bisacodyl Suppository 10 milliGRAM(s) Rectal daily PRN  chlorhexidine 2% Cloths 1 Application(s) Topical daily  chlorhexidine 4% Liquid 1 Application(s) Topical <User Schedule>  heparin  Infusion 1200 Unit(s)/Hr IV Continuous <Continuous>  hydrALAZINE 100 milliGRAM(s) Oral every 8 hours  insulin lispro (ADMELOG) corrective regimen sliding scale   SubCutaneous at bedtime  insulin lispro (ADMELOG) corrective regimen sliding scale   SubCutaneous three times a day before meals  isosorbide   dinitrate Tablet (ISORDIL) 40 milliGRAM(s) Oral three times a day  lidocaine   4% Patch 1 Patch Transdermal daily  metoprolol succinate ER 25 milliGRAM(s) Oral daily  milrinone Infusion 0.5 MICROgram(s)/kG/Min IV Continuous <Continuous>  nitroprusside Infusion 0.25 MICROgram(s)/kG/Min IV Continuous <Continuous>  pantoprazole    Tablet 40 milliGRAM(s) Oral before breakfast  polyethylene glycol 3350 17 Gram(s) Oral daily  senna 1 Tablet(s) Oral at bedtime  simethicone 80 milliGRAM(s) Chew every 6 hours PRN  sodium chloride 0.65% Nasal 1 Spray(s) Both Nostrils every 6 hours PRN      Physical Exam:    Vitals:  Vital Signs Last 24 Hours  T(C): 37 (06-15-22 @ 08:00), Max: 37.6 (22 @ 20:00)  HR: 80 (06-15-22 @ 09:00) (80 - 112)  BP: --  RR: 20 (06-15-22 @ 09:00) (16 - 28)  SpO2: 94% (06-15-22 @ 09:00) (90% - 97%)    Weight in k.6 (06-15 @ 05:00)    I&O's Summary    2022 07:01  -  15 Gus 2022 07:00  --------------------------------------------------------  IN: 1686.9 mL / OUT: 2350 mL / NET: -663.1 mL    15 Gus 2022 07:01  -  15 Gus 2022 09:07  --------------------------------------------------------  IN: 291.8 mL / OUT: 1050 mL / NET: -758.2 mL        Tele:    General: No distress. Comfortable.  HEENT: EOM intact.  Neck: Neck supple. JVP not elevated. No masses  Chest: Clear to auscultation bilaterally  CV: Normal S1 and S2. No murmurs, rub, or gallops. Radial pulses normal.  Abdomen: Soft, non-distended, non-tender  Skin: No rashes or skin breakdown  Neurology: Alert and oriented times three. Sensation intact  Psych: Affect normal    Labs:                        12.4   10.91 )-----------( 216      ( 15 Gus 2022 04:39 )             37.3     06-15    129<L>  |  96  |  26<H>  ----------------------------<  135<H>  4.6   |  20<L>  |  1.44<H>    Ca    9.1      15 Gus 2022 04:38  Phos  3.7     06-15  Mg     2.0     15    TPro  6.6  /  Alb  3.0<L>  /  TBili  0.6  /  DBili  x   /  AST  36  /  ALT  55<H>  /  AlkPhos  87  0615    PT/INR - ( 15 Gus 2022 04:39 )   PT: 13.7 sec;   INR: 1.19 ratio         PTT - ( 15 Gus 2022 04:39 )  PTT:51.1 sec            Lactate, Blood: 1.1 mmol/L (06-15 @ 04:39)  Lactate, Blood: 1.4 mmol/L ( @ 05:17)  Lactate, Blood: 1.0 mmol/L ( @ 05:00)

## 2022-06-15 NOTE — PROGRESS NOTE ADULT - CRITICAL CARE ATTENDING COMMENT
no events overnight.   nipride d/c for well controlled MAPs.  small rise in Cr.   meds; milrinone .5, heparin (PTT 51), amnio 200, HDZN 100 , ISDN 40, toprol 25, asa, ppi, allopruinol, insulin   HR , aug MAPS 74-84, aug diast , afebrile   I/o: -663. -1.1 -1.8  06-15  129<L>  |  96  |  26<H>  ----------------------------<  135<H>  4.6   |  20<L>  |  1.44<H> (Cr 1.22)  Ca    9.1      15 Gus 2022 04:38  Phos  3.7     06-15  Mg     2.0     06-15  TPro  6.6  /  Alb  3.0<L>  /  TBili  0.6  /  DBili  x   /  AST  36  /  ALT  55<H>  /  AlkPhos  87  06-15                        12.4   10.91 )-----------( 216      ( 15 Gus 2022 04:39 )             37.3   WBC improved.   note small rise in Cr.  Plan:  please encourage oral intake. 1500 cc min today.   recheck chemistry in pm.   remains UNOS IIe ABO A, PRA 0  Dany Dominique

## 2022-06-15 NOTE — PROGRESS NOTE ADULT - SUBJECTIVE AND OBJECTIVE BOX
GOCOOL, LENNOX  MRN-[id number]  Patient is a 66y old  Male who presents with a chief complaint of LVAD/OHT eval (15 Gus 2022 14:19)    HPI:  66M Mixed Ischemic/NICM Cardiomyopathy (EF 25-30% at baseline, s/p BIV-ICD), CAD (medically managed MIs in 2008,2011, Most recent stent in April 2022 to mLAD) presented with multiple near syncope, found to have 41 episodes of VT and admitted initially to cardiac telemetry for further evaluation/management. Ischemic evaluation without new disease and VT ablation without substrate to complete ablation.   Transferred from [facility] for further management and evaluation for LVAD vs OHT.    (26 May 2022 20:31)      Hospital Course:    24 HOUR EVENTS:    REVIEW OF SYSTEMS:    CONSTITUTIONAL: No weakness, fevers or chills  EYES/ENT: No visual changes;  No vertigo or throat pain   NECK: No pain or stiffness  RESPIRATORY: No cough, wheezing, hemoptysis; No shortness of breath  CARDIOVASCULAR: No chest pain or palpitations  GASTROINTESTINAL: No abdominal or epigastric pain. No nausea, vomiting, or hematemesis; No diarrhea or constipation. No melena or hematochezia.  GENITOURINARY: No dysuria, frequency or hematuria  NEUROLOGICAL: No numbness or weakness  SKIN: No itching, rashes      ICU Vital Signs Last 24 Hrs  T(C): 37.2 (15 Gus 2022 19:00), Max: 37.2 (15 Gus 2022 16:00)  T(F): 98.9 (15 Gus 2022 19:00), Max: 99 (15 Gus 2022 16:00)  HR: 82 (15 Gus 2022 22:15) (78 - 116)  BP: --  BP(mean): --  ABP: --  ABP(mean): --  RR: 25 (15 Gus 2022 22:15) (18 - 32)  SpO2: 92% (15 Gus 2022 22:15) (90% - 97%)      CVP(mm Hg): --  CO: --  CI: --  PA: --  PA(mean): --  PA(direct): --  PCWP: --  LA: --  RA: --  SVR: --  SVRI: --  PVR: --  PVRI: --  I&O's Summary    14 Jun 2022 07:01  -  15 Gus 2022 07:00  --------------------------------------------------------  IN: 1686.9 mL / OUT: 2350 mL / NET: -663.1 mL    15 Gus 2022 07:01  -  15 Gus 2022 23:17  --------------------------------------------------------  IN: 1892.6 mL / OUT: 2600 mL / NET: -707.4 mL        CAPILLARY BLOOD GLUCOSE    CAPILLARY BLOOD GLUCOSE      POCT Blood Glucose.: 161 mg/dL (15 Gus 2022 22:12)      PHYSICAL EXAM:  GENERAL: No acute distress, well-developed  HEAD:  Atraumatic, Normocephalic  EYES: EOMI, PERRLA, conjunctiva and sclera clear  NECK: Supple, no lymphadenopathy, no JVD  CHEST/LUNG: CTAB; No wheezes, rales, or rhonchi  HEART: Regular rate and rhythm. Normal S1/S2. No murmurs, rubs, or gallops  ABDOMEN: Soft, non-tender, non-distended; normal bowel sounds, no organomegaly  EXTREMITIES:  2+ peripheral pulses b/l, No clubbing, cyanosis, or edema  NEUROLOGY: A&O x 3, no focal deficits  SKIN: No rashes or lesions    ============================I/O===========================   I&O's Detail    14 Jun 2022 07:01  -  15 Gus 2022 07:00  --------------------------------------------------------  IN:    Heparin: 360 mL    Milrinone: 263.7 mL    Nitroprusside: 203.2 mL    Oral Fluid: 860 mL  Total IN: 1686.9 mL    OUT:    Voided (mL): 2350 mL  Total OUT: 2350 mL    Total NET: -663.1 mL      15 Gus 2022 07:01  -  15 Gus 2022 23:17  --------------------------------------------------------  IN:    Heparin: 240 mL    IV PiggyBack: 250 mL    Milrinone: 174.4 mL    Nitroprusside: 508.2 mL    Oral Fluid: 720 mL  Total IN: 1892.6 mL    OUT:    Voided (mL): 2600 mL  Total OUT: 2600 mL    Total NET: -707.4 mL        ============================ LABS =========================                        12.4   10.91 )-----------( 216      ( 15 Gus 2022 04:39 )             37.3     06-15    128<L>  |  96  |  24<H>  ----------------------------<  133<H>  4.8   |  21<L>  |  1.27    Ca    9.0      15 Gus 2022 17:09  Phos  3.7     06-15  Mg     2.0     06-15    TPro  6.9  /  Alb  3.3  /  TBili  0.5  /  DBili  x   /  AST  37  /  ALT  55<H>  /  AlkPhos  94  06-15                LIVER FUNCTIONS - ( 15 Gus 2022 17:09 )  Alb: 3.3 g/dL / Pro: 6.9 g/dL / ALK PHOS: 94 U/L / ALT: 55 U/L / AST: 37 U/L / GGT: x           PT/INR - ( 15 Gus 2022 04:39 )   PT: 13.7 sec;   INR: 1.19 ratio         PTT - ( 15 Gus 2022 04:39 )  PTT:51.1 sec    Lactate, Blood: 1.1 mmol/L (06-15-22 @ 04:39)  Lactate, Blood: 1.4 mmol/L (06-14-22 @ 05:17)  Lactate, Blood: 1.0 mmol/L (06-13-22 @ 05:00)  Blood Gas Venous - Lactate: 1.0 mmol/L (06-13-22 @ 04:45)      ======================Micro/Rad/Cardio=================  Telemtry: Reviewed   EKG: Reviewed  CXR: Reviewed  Culture: Reviewed   Echo:   Cath:   ======================================================  PAST MEDICAL & SURGICAL HISTORY:  AV block      Essential hypertension      Chronic HFrEF (heart failure with reduced ejection fraction)      Chronic kidney disease, unspecified CKD stage      CAD (coronary artery disease)      HLD (hyperlipidemia)      Type 2 diabetes mellitus      Artificial cardiac pacemaker        ====================ASSESSMENT ==============                Plan:  ====================== NEUROLOGY=====================  acetaminophen     Tablet .. 650 milliGRAM(s) Oral every 6 hours PRN Temp greater or equal to 38C (100.4F), Mild Pain (1 - 3)    ==================== RESPIRATORY======================  Mechanical Ventilation:      ====================CARDIOVASCULAR==================  aMIOdarone    Tablet 200 milliGRAM(s) Oral daily  hydrALAZINE 100 milliGRAM(s) Oral every 8 hours  isosorbide   dinitrate Tablet (ISORDIL) 40 milliGRAM(s) Oral three times a day  metoprolol succinate ER 25 milliGRAM(s) Oral daily  milrinone Infusion 0.5 MICROgram(s)/kG/Min (11 mL/Hr) IV Continuous <Continuous>  nitroprusside Infusion 0.25 MICROgram(s)/kG/Min (2.73 mL/Hr) IV Continuous <Continuous>    ===================HEMATOLOGIC/ONC ===================  aspirin enteric coated 81 milliGRAM(s) Oral daily  heparin  Infusion 1200 Unit(s)/Hr (15 mL/Hr) IV Continuous <Continuous>    ===================== RENAL =========================  Continue monitoring urine output    ==================== GASTROINTESTINAL===================  bisacodyl Suppository 10 milliGRAM(s) Rectal daily PRN Constipation  pantoprazole    Tablet 40 milliGRAM(s) Oral before breakfast  polyethylene glycol 3350 17 Gram(s) Oral daily  senna 1 Tablet(s) Oral at bedtime  simethicone 80 milliGRAM(s) Chew every 6 hours PRN Gas    =======================    ENDOCRINE  =====================  allopurinol 100 milliGRAM(s) Oral daily  insulin lispro (ADMELOG) corrective regimen sliding scale   SubCutaneous at bedtime  insulin lispro (ADMELOG) corrective regimen sliding scale   SubCutaneous three times a day before meals    ========================INFECTIOUS DISEASE================      Patient requires continuous monitoring with bedside rhythm monitoring, pulse ox monitoring, and intermittent blood gas analysis. Care plan discussed with ICU care team. Patient remained critical and at risk for life threatening decompensation.  Patient seen, examined and plan discussed with CCU team during rounds.     I have personally provided ____ minutes of critical care time excluding time spent on separate procedures, in addition to initial critical care time provided by the CICU Attending, Dr. Leonardo/ Jamia/ Jaime/ Mansoor/ Mary/ Ella .     By signing my name below, I, Rose Arias, attest that this documentation has been prepared under the direction and in the presence of _____  Electronically signed: Mini Goddard, 06-15-22 @ 23:17    I, ____ , personally performed the services described in this documentation. all medical record entries made by the nelidaibshira were at my direction and in my presence. I have reviewed the chart and agree that the record reflects my personal performance and is accurate and complete  Electronically signed: ______       GOCOOL, LENNOX  [id number]  Patient is a 66y old  Male who presents with a chief complaint of LVAD/OHT eval (15 Gus 2022 14:19)    HPI:  66M Mixed Ischemic/NICM Cardiomyopathy (EF 25-30% at baseline, s/p BIV-ICD), CAD (medically managed MIs in 2008,2011, Most recent stent in April 2022 to mLAD) presented with multiple near syncope, found to have 41 episodes of VT and admitted initially to cardiac telemetry for further evaluation/management. Ischemic evaluation without new disease and VT ablation without substrate to complete ablation.   Transferred from [facility] for further management and evaluation for LVAD vs OHT.    (26 May 2022 20:31)      Hospital Course:  5/26 Transferred to CCU for further management    24 HOUR EVENTS:    REVIEW OF SYSTEMS:    CONSTITUTIONAL: No weakness, fevers or chills  EYES/ENT: No visual changes;  No vertigo or throat pain   NECK: No pain or stiffness  RESPIRATORY: No cough, wheezing, hemoptysis; No shortness of breath  CARDIOVASCULAR: No chest pain or palpitations  GASTROINTESTINAL: No abdominal or epigastric pain. No nausea, vomiting, or hematemesis; No diarrhea or constipation. No melena or hematochezia.  GENITOURINARY: No dysuria, frequency or hematuria  NEUROLOGICAL: No numbness or weakness  SKIN: No itching, rashes      ICU Vital Signs Last 24 Hrs  T(C): 37.2 (15 Gus 2022 19:00), Max: 37.2 (15 Gus 2022 16:00)  T(F): 98.9 (15 Gus 2022 19:00), Max: 99 (15 Gus 2022 16:00)  HR: 82 (15 Gus 2022 22:15) (78 - 116)  BP: --  BP(mean): --  ABP: --  ABP(mean): --  RR: 25 (15 Gus 2022 22:15) (18 - 32)  SpO2: 92% (15 Gus 2022 22:15) (90% - 97%)      CVP(mm Hg): --  CO: --  CI: --  PA: --  PA(mean): --  PA(direct): --  PCWP: --  LA: --  RA: --  SVR: --  SVRI: --  PVR: --  PVRI: --  I&O's Summary    14 Jun 2022 07:01  -  15 Gus 2022 07:00  --------------------------------------------------------  IN: 1686.9 mL / OUT: 2350 mL / NET: -663.1 mL    15 Gus 2022 07:01  -  15 Gus 2022 23:17  --------------------------------------------------------  IN: 1892.6 mL / OUT: 2600 mL / NET: -707.4 mL        CAPILLARY BLOOD GLUCOSE    CAPILLARY BLOOD GLUCOSE      POCT Blood Glucose.: 161 mg/dL (15 Gus 2022 22:12)      PHYSICAL EXAM:  Appearance: Normal, NAD  HEAD:  Normocephalic  EYES:  PERRLA, conjunctiva and sclera clear  NECK: Supple, No JVD  CHEST/LUNG: Clear to auscultation bilaterally; No wheezing  HEART: Normal S1, S2. No rubs, or gallops  ABDOMEN: + Bowel sounds, Soft, NT, ND   EXTREMITIES:  2+ Peripheral Pulses, No clubbing, cyanosis, or edema. b/l Knees equal in temperature, no effusions noted, patient does not want to flex knee.   NEUROLOGY: non-focal, aaox3  SKIN: No rashes or lesions      ============================I/O===========================   I&O's Detail    14 Jun 2022 07:01  -  15 Gus 2022 07:00  --------------------------------------------------------  IN:    Heparin: 360 mL    Milrinone: 263.7 mL    Nitroprusside: 203.2 mL    Oral Fluid: 860 mL  Total IN: 1686.9 mL    OUT:    Voided (mL): 2350 mL  Total OUT: 2350 mL    Total NET: -663.1 mL      15 Gus 2022 07:01  -  15 Gus 2022 23:17  --------------------------------------------------------  IN:    Heparin: 240 mL    IV PiggyBack: 250 mL    Milrinone: 174.4 mL    Nitroprusside: 508.2 mL    Oral Fluid: 720 mL  Total IN: 1892.6 mL    OUT:    Voided (mL): 2600 mL  Total OUT: 2600 mL    Total NET: -707.4 mL        ============================ LABS =========================                        12.4   10.91 )-----------( 216      ( 15 Gus 2022 04:39 )             37.3     06-15    128<L>  |  96  |  24<H>  ----------------------------<  133<H>  4.8   |  21<L>  |  1.27    Ca    9.0      15 Gus 2022 17:09  Phos  3.7     06-15  Mg     2.0     06-15    TPro  6.9  /  Alb  3.3  /  TBili  0.5  /  DBili  x   /  AST  37  /  ALT  55<H>  /  AlkPhos  94  06-15                LIVER FUNCTIONS - ( 15 Gus 2022 17:09 )  Alb: 3.3 g/dL / Pro: 6.9 g/dL / ALK PHOS: 94 U/L / ALT: 55 U/L / AST: 37 U/L / GGT: x           PT/INR - ( 15 Gus 2022 04:39 )   PT: 13.7 sec;   INR: 1.19 ratio         PTT - ( 15 Gus 2022 04:39 )  PTT:51.1 sec    Lactate, Blood: 1.1 mmol/L (06-15-22 @ 04:39)  Lactate, Blood: 1.4 mmol/L (06-14-22 @ 05:17)  Lactate, Blood: 1.0 mmol/L (06-13-22 @ 05:00)  Blood Gas Venous - Lactate: 1.0 mmol/L (06-13-22 @ 04:45)      ======================Micro/Rad/Cardio=================  Telemtry: Reviewed   EKG: Reviewed  CXR: Reviewed  Culture: Reviewed   Echo:   Cath:   ======================================================  PAST MEDICAL & SURGICAL HISTORY:  AV block      Essential hypertension      Chronic HFrEF (heart failure with reduced ejection fraction)      Chronic kidney disease, unspecified CKD stage      CAD (coronary artery disease)      HLD (hyperlipidemia)      Type 2 diabetes mellitus      Artificial cardiac pacemaker        ====================ASSESSMENT ==============  65 y/o male w/ PMHx HTN, HLD, type 2 DM, chronic HFrEF (EF 25-30% by Echo), complete heart block s/p PPM (in 2006, upgraded to BiV-ICD in 09/2016), CAD (s/p recent BERNABE mid LAD at [facility] on 04/18/2022), and stage II CKD (baseline Cr ~1.3) presented with near syncope to OSH found to have 41 episodes of VT on device, s/p unsuccessful VT ablation and transferred to [facility] for management of cardiogenic shock and advanced therapy eval.     Plan:  ====================== NEUROLOGY=====================   AAOx3  - No active issues  - Tylenol PRN for fevers/pain    acetaminophen     Tablet .. 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   - monitor hemodynamics and perfusion indices  - HF following, listed for OHT Status 2E  -C/w isodril    VTach   - s/p EPS on 5/24, unable to perform ablation as no substrate found and did not induce VT because of severely low EF   - Interrogation of ICD @Dr Wilson's office 5/20: back-to-back episodes of VT @ 200+ bpm all terminating with ATP. Since last cath pt has had 41 VT episodes (all falling into VF zone)  - Normal device function. BIV pacing 97%. No programming changes made  - c/w Amiodarone 200 QD  - c/w Toprol 25 daily  - No further VT on inotropic support  - keep K 4-4.5  - EP consult 6/15 for atrial tachycardia, prior VT as assessment of amiodarone   CAD   - Chest pain free and compliant with DAPT since last PCI 4/18/22 per discussion with interventional at OSH, ok to DC plavix pending cardiac surgery, last dose 5/28  - Cardiac cath @[facility] 4/18/22: mLAD 90% s/p BERNABE, LCx mild disease, RCA mild disease s/p diagnostic cardiac cath w/ Dr Wagner on 05/23/2022   - C/w ASA, d/c lipitor for elevated LFTs, pending transplant    aMIOdarone    Tablet 200 milliGRAM(s) Oral daily  hydrALAZINE 100 milliGRAM(s) Oral every 8 hours  isosorbide   dinitrate Tablet (ISORDIL) 40 milliGRAM(s) Oral three times a day  metoprolol succinate ER 25 milliGRAM(s) Oral daily  milrinone Infusion 0.5 MICROgram(s)/kG/Min (11 mL/Hr) IV Continuous <Continuous>  nitroprusside Infusion 0.25 MICROgram(s)/kG/Min (2.73 mL/Hr) IV Continuous <Continuous>  aspirin enteric coated 81 milliGRAM(s) Oral daily  ===================HEMATOLOGIC/ONC ===================  ?Secondary Polycythemia Vera   - elevated EPO, hgb in normal range  - Heme following peripherally  - f/u MELINA-2     AC therapy   - Heparin gtt for IABP    heparin  Infusion 1200 Unit(s)/Hr (15 mL/Hr) IV Continuous <Continuous>    ===================== RENAL =========================  JAGRUTI on CKD II  Admitted w/ Cr 2.01 (baseline Cr ~1.3) now rising with inotropic support; 1.14 ->1.22  - likely i/s/o cardiogenic shock given RHC findings of CI 1.88  - Avoid nephrotoxic agents, NSAIDs. Renally dose meds.  - monitor strict I/Os and continue current cardiac support  - Continue monitoring urine output      ==================== GASTROINTESTINAL===================  Elevated Liver Enzymes:  -elevated AST/ALT, normal bilirubin, alk phos. statin d/c 6/13. ? related to amiodarone toxicity,    - Abd US on 6/14: Contracted gallbladder in the setting of recent meal. No acute cholecystitis.    Diet:   - Tolerating PO diet, Constant carb    Constipation:  - Cont. bowel regimen Miralax, Senna, PRN simethicone and bisacodyl   - s/p colonoscopy 6/3 with 5 polyps removed and biopsied, benign findings- GI recs for 3 yr follow up colo  - Protonix for GI prophylaxis    bisacodyl Suppository 10 milliGRAM(s) Rectal daily PRN Constipation  pantoprazole    Tablet 40 milliGRAM(s) Oral before breakfast  polyethylene glycol 3350 17 Gram(s) Oral daily  senna 1 Tablet(s) Oral at bedtime  simethicone 80 milliGRAM(s) Chew every 6 hours PRN Gas    =======================    ENDOCRINE  =====================  T2DM (A1C 6.2 on admission)  - Cont. ISS, glucose controlled, monitor FS closely     Gout flare, Left knee (previously R)  - continue allopurinol, s/p prednisone  -6/15: with right wrist pain, left knee and left ankle pain possible 2/2 gout flare, f/u xray wrist     allopurinol 100 milliGRAM(s) Oral daily  insulin lispro (ADMELOG) corrective regimen sliding scale   SubCutaneous at bedtime  insulin lispro (ADMELOG) corrective regimen sliding scale   SubCutaneous three times a day before meals    ========================INFECTIOUS DISEASE================  RUE Thrombophlebitis  - RUE US Duplex showing superficial thrombus  - s/p course of ancef    Febrile 6/7-8 overnight x1   - Temp 99.7 F  - UA neg, BCx on 6/7 NGTD x2, RVP neg   - follow fever curve, WBC       Patient requires continuous monitoring with bedside rhythm monitoring, pulse ox monitoring, and intermittent blood gas analysis. Care plan discussed with ICU care team. Patient remained critical and at risk for life threatening decompensation.  Patient seen, examined and plan discussed with CCU team during rounds.     I have personally provided ____ minutes of critical care time excluding time spent on separate procedures, in addition to initial critical care time provided by the CICU Attending, Dr. Blandon    By signing my name below, I, Rose Arias, attest that this documentation has been prepared under the direction and in the presence of RAMSES Jimenez  Electronically signed: Licha Goddard, 06-15-22 @ 23:17    I RAMSES Jimenez, personally performed the services described in this documentation. all medical record entries made by the licha were at my direction and in my presence. I have reviewed the chart and agree that the record reflects my personal performance and is accurate and complete  Electronically signed: RAMSES Jmienez         GOCOOL, LENNOX  MRN-[id number]  Patient is a 66y old  Male who presents with a chief complaint of LVAD/OHT eval (15 Gus 2022 14:19)    HPI:  66M Mixed Ischemic/NICM Cardiomyopathy (EF 25-30% at baseline, s/p BIV-ICD), CAD (medically managed MIs in 2008,2011, Most recent stent in April 2022 to mLAD) presented with multiple near syncope, found to have 41 episodes of VT and admitted initially to cardiac telemetry for further evaluation/management. Ischemic evaluation without new disease and VT ablation without substrate to complete ablation.   Transferred from [facility] for further management and evaluation for LVAD vs OHT.    (26 May 2022 20:31)      Hospital Course:  5/26 Transferred to CCU for further management    24 HOUR EVENTS: no acute events     REVIEW OF SYSTEMS:    CONSTITUTIONAL: No weakness, fevers or chills  EYES/ENT: No visual changes;  No vertigo or throat pain   NECK: No pain or stiffness  RESPIRATORY: No cough, wheezing, hemoptysis; No shortness of breath  CARDIOVASCULAR: No chest pain or palpitations  GASTROINTESTINAL: No abdominal or epigastric pain. No nausea, vomiting, or hematemesis; No diarrhea or constipation. No melena or hematochezia.  GENITOURINARY: No dysuria, frequency or hematuria  NEUROLOGICAL: No numbness or weakness  SKIN: No itching, rashes      ICU Vital Signs Last 24 Hrs  T(C): 37.2 (15 Gus 2022 19:00), Max: 37.2 (15 Gus 2022 16:00)  T(F): 98.9 (15 Gus 2022 19:00), Max: 99 (15 Gus 2022 16:00)  HR: 82 (15 Gus 2022 22:15) (78 - 116)  RR: 25 (15 Gus 2022 22:15) (18 - 32)  SpO2: 92% (15 Gus 2022 22:15) (90% - 97%)        I&O's Summary    14 Jun 2022 07:01  -  15 Gus 2022 07:00  --------------------------------------------------------  IN: 1686.9 mL / OUT: 2350 mL / NET: -663.1 mL    15 Gus 2022 07:01  -  15 Gus 2022 23:17  --------------------------------------------------------  IN: 1892.6 mL / OUT: 2600 mL / NET: -707.4 mL        CAPILLARY BLOOD GLUCOSE    CAPILLARY BLOOD GLUCOSE      POCT Blood Glucose.: 161 mg/dL (15 Gus 2022 22:12)      ============================I/O===========================   I&O's Detail    14 Jun 2022 07:01  -  15 Gus 2022 07:00  --------------------------------------------------------  IN:    Heparin: 360 mL    Milrinone: 263.7 mL    Nitroprusside: 203.2 mL    Oral Fluid: 860 mL  Total IN: 1686.9 mL    OUT:    Voided (mL): 2350 mL  Total OUT: 2350 mL    Total NET: -663.1 mL      15 Gus 2022 07:01  -  15 Gus 2022 23:17  --------------------------------------------------------  IN:    Heparin: 240 mL    IV PiggyBack: 250 mL    Milrinone: 174.4 mL    Nitroprusside: 508.2 mL    Oral Fluid: 720 mL  Total IN: 1892.6 mL    OUT:    Voided (mL): 2600 mL  Total OUT: 2600 mL    Total NET: -707.4 mL        ============================ LABS =========================                        12.4   10.91 )-----------( 216      ( 15 Gus 2022 04:39 )             37.3     06-15    128<L>  |  96  |  24<H>  ----------------------------<  133<H>  4.8   |  21<L>  |  1.27    Ca    9.0      15 Gus 2022 17:09  Phos  3.7     06-15  Mg     2.0     06-15    TPro  6.9  /  Alb  3.3  /  TBili  0.5  /  DBili  x   /  AST  37  /  ALT  55<H>  /  AlkPhos  94  06-15                LIVER FUNCTIONS - ( 15 Gus 2022 17:09 )  Alb: 3.3 g/dL / Pro: 6.9 g/dL / ALK PHOS: 94 U/L / ALT: 55 U/L / AST: 37 U/L / GGT: x           PT/INR - ( 15 Gus 2022 04:39 )   PT: 13.7 sec;   INR: 1.19 ratio         PTT - ( 15 Gus 2022 04:39 )  PTT:51.1 sec    Lactate, Blood: 1.1 mmol/L (06-15-22 @ 04:39)  Lactate, Blood: 1.4 mmol/L (06-14-22 @ 05:17)  Lactate, Blood: 1.0 mmol/L (06-13-22 @ 05:00)  Blood Gas Venous - Lactate: 1.0 mmol/L (06-13-22 @ 04:45)      ======================Micro/Rad/Cardio=================  Telemtry: Reviewed   EKG: Reviewed  CXR: Reviewed  Culture: Reviewed   Echo:   Cath:   ======================================================  PAST MEDICAL & SURGICAL HISTORY:  AV block      Essential hypertension      Chronic HFrEF (heart failure with reduced ejection fraction)      Chronic kidney disease, unspecified CKD stage      CAD (coronary artery disease)      HLD (hyperlipidemia)      Type 2 diabetes mellitus      Artificial cardiac pacemaker        ====================ASSESSMENT ==============  65 y/o male w/ PMHx HTN, HLD, type 2 DM, chronic HFrEF (EF 25-30% by Echo), complete heart block s/p PPM (in 2006, upgraded to BiV-ICD in 09/2016), CAD (s/p recent BERNABE mid LAD at [facility] on 04/18/2022), and stage II CKD (baseline Cr ~1.3) presented with near syncope to OSH found to have 41 episodes of VT on device, s/p unsuccessful VT ablation and transferred to [facility] for management of cardiogenic shock and advanced therapy eval.     Plan:  ====================== NEUROLOGY=====================   AAOx3  - No active issues  - Tylenol PRN for fevers/pain    acetaminophen     Tablet .. 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   - monitor hemodynamics and perfusion indices  - HF following, listed for OHT Status 2E  -C/w isodril      VTach   - s/p EPS on 5/24, unable to perform ablation as no substrate found and did not induce VT because of severely low EF   - Interrogation of ICD @Dr Wilson's office 5/20: back-to-back episodes of VT @ 200+ bpm all terminating with ATP. Since last cath pt has had 41 VT episodes (all falling into VF zone)  - Normal device function. BIV pacing 97%. No programming changes made  - c/w Amiodarone 200 QD  - c/w Toprol 25 daily  - No further VT on inotropic support  - keep K 4-4.5  - EP consult 6/15 for atrial tachycardia, prior VT as assessment of amiodarone     CAD   - Chest pain free and compliant with DAPT since last PCI 4/18/22 per discussion with interventional at OSH, ok to DC plavix pending cardiac surgery, last dose 5/28  - Cardiac cath @[facility] 4/18/22: mLAD 90% s/p BERNABE, LCx mild disease, RCA mild disease s/p diagnostic cardiac cath w/ Dr Wagner on 05/23/2022   - C/w ASA, d/c lipitor for elevated LFTs, pending transplant    aMIOdarone    Tablet 200 milliGRAM(s) Oral daily  hydrALAZINE 100 milliGRAM(s) Oral every 8 hours  isosorbide   dinitrate Tablet (ISORDIL) 40 milliGRAM(s) Oral three times a day  metoprolol succinate ER 25 milliGRAM(s) Oral daily  milrinone Infusion 0.5 MICROgram(s)/kG/Min (11 mL/Hr) IV Continuous <Continuous>  nitroprusside Infusion 0.25 MICROgram(s)/kG/Min (2.73 mL/Hr) IV Continuous <Continuous>  aspirin enteric coated 81 milliGRAM(s) Oral daily  ===================HEMATOLOGIC/ONC ===================  ?Secondary Polycythemia Vera   - elevated EPO, hgb in normal range  - Heme following peripherally  - f/u MELINA-2     AC therapy   - Heparin gtt for IABP    heparin  Infusion 1200 Unit(s)/Hr (15 mL/Hr) IV Continuous <Continuous>    ===================== RENAL =========================  JAGRUTI on CKD II  Admitted w/ Cr 2.01 (baseline Cr ~1.3) now rising with inotropic support; 1.14 ->1.22  - likely i/s/o cardiogenic shock given RHC findings of CI 1.88  - Avoid nephrotoxic agents, NSAIDs. Renally dose meds.  - monitor strict I/Os and continue current cardiac support  - Continue monitoring urine output      ==================== GASTROINTESTINAL===================  Elevated Liver Enzymes:  -elevated AST/ALT, normal bilirubin, alk phos. statin d/c 6/13. ? related to amiodarone toxicity,    - Abd US on 6/14: Contracted gallbladder in the setting of recent meal. No acute cholecystitis.    Diet:   - Tolerating PO diet, Constant carb    Constipation:  - Cont. bowel regimen Miralax, Senna, PRN simethicone and bisacodyl   - s/p colonoscopy 6/3 with 5 polyps removed and biopsied, benign findings- GI recs for 3 yr follow up colo  - Protonix for GI prophylaxis    bisacodyl Suppository 10 milliGRAM(s) Rectal daily PRN Constipation  pantoprazole    Tablet 40 milliGRAM(s) Oral before breakfast  polyethylene glycol 3350 17 Gram(s) Oral daily  senna 1 Tablet(s) Oral at bedtime  simethicone 80 milliGRAM(s) Chew every 6 hours PRN Gas    =======================    ENDOCRINE  =====================  T2DM (A1C 6.2 on admission)  - Cont. ISS, glucose controlled, monitor FS closely     Gout flare, Left knee (previously R)  - continue allopurinol, s/p prednisone  -6/15: with right wrist pain, left knee and left ankle pain possible 2/2 gout flare, f/u xray wrist     allopurinol 100 milliGRAM(s) Oral daily  insulin lispro (ADMELOG) corrective regimen sliding scale   SubCutaneous at bedtime  insulin lispro (ADMELOG) corrective regimen sliding scale   SubCutaneous three times a day before meals    ========================INFECTIOUS DISEASE================  RUE Thrombophlebitis  - RUE US Duplex showing superficial thrombus  - s/p course of ancef    Febrile 6/7-8 overnight x1   - Temp 99.7 F  - UA neg, BCx on 6/7 NGTD x2, RVP neg   - follow fever curve, WBC       Patient requires continuous monitoring with bedside rhythm monitoring, pulse ox monitoring, and intermittent blood gas analysis. Care plan discussed with ICU care team. Patient remained critical and at risk for life threatening decompensation.  Patient seen, examined and plan discussed with CCU team during rounds.     I have personally provided 35 minutes of critical care time excluding time spent on separate procedures, in addition to initial critical care time provided by the CICU Attending, Dr. Blandon    By signing my name below, I, Rose Arias, attest that this documentation has been prepared under the direction and in the presence of RAMSES Jimenez  Electronically signed: Mini Goddard, 06-15-22 @ 23:17    I RAMSES Jimenez, personally performed the services described in this documentation. all medical record entries made by the nelidaibshira were at my direction and in my presence. I have reviewed the chart and agree that the record reflects my personal performance and is accurate and complete  Electronically signed: RAMSES Jimenez

## 2022-06-15 NOTE — PROGRESS NOTE ADULT - ASSESSMENT
67 y/o male w/ PMHx HTN, HLD, type 2 DM, chronic HFrEF (EF 25-30% by Echo), complete heart block s/p PPM (in 2006, upgraded to BiV-ICD in 09/2016), CAD (s/p recent BERNABE mid LAD at [facility] on 04/18/2022), and stage II CKD (baseline Cr ~1.3) presented with near syncope to OSH found to have 41 episodes of VT on device, s/p unsuccessful VT ablation and transferred to [facility] for management of cardiogenic shock and advanced therapy eval.       Plan:  ====================== NEUROLOGY=====================   AAOx3  - No active issues  - Tylenol PRN for fevers/pain    acetaminophen     Tablet .. 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  - bedside nipride study done 5/29 with reduction in PVR to <2    ====================CARDIOVASCULAR==================  Cardiogenic shock  - Maintain IABP 1:1,monitor PTT on heparin gtt  - Maintain Milrinone 0.5mcg/kg/min, s/p nitro gtt d/c overnight 6/14   - Cont hydral 100 TID and ISDN 40TID for AL Reduction, Assisted mean goal 70-80   - monitor hemodynamics and perfusion indices  - HF following, listed for OHT Status 2E    Relevant studies:  - TTE 5/21: LV 5.2 cm, LVEF 10-15%, LVOT VTI 10 cm, moderate RV dysfunction, mild AI, minimal MR  - TTE on 5/27: EF 15% Severe global LV systolic dysfunction. RV enlargement with decreased RV systolic function.  - [facility] 5/23: patent mLAD stent with slow flow, D1 with 40-50% stenosis  - [facility] 5/26: RA 12, PA 70/40 (50), PCWP 22, Milana CO/CI 2.3/1.3, MAP 83 with SVR 2469, PVR 12 PERSAUD  - 5/27: RA 10, PA 76/35 (49), PCWP 34, PA sat 57% with Milana CO/CI 3.1/1.6, MAP 71 with SVR 1574, PVR 4.8  - 6/6 RHC: CVP 17, RV 75/4, PA 80/36, PCW 22, CI 1.88. IABP placed and pt transferred to CICU.      VTach   - s/p EPS on 5/24, unable to perform ablation as no substrate found and did not induce VT because of severely low EF   - Interrogation of ICD @Dr Wilson's office 5/20: back-to-back episodes of VT @ 200+ bpm all terminating with ATP. Since last cath pt has had 41 VT episodes (all falling into VF zone)  - Normal device function. BIV pacing 97%. No programming changes made  - c/w Amiodarone 200 QD  - c/w Toprol 25 daily  - No further VT on inotropic support  - keep K 4-4.5    CAD   - Chest pain free and compliant with DAPT since last PCI 4/18/22 per discussion with interventional at OSH, ok to DC plavix pending cardiac surgery, last dose 5/28  - Cardiac cath @[facility] 4/18/22: mLAD 90% s/p BERNABE, LCx mild disease, RCA mild disease s/p diagnostic cardiac cath w/ Dr Wagner on 05/23/2022   - C/w ASA, d/c lipitor for elevated LFTs, pending transplant    aspirin enteric coated 81 milliGRAM(s) Oral daily  aMIOdarone    Tablet 200 milliGRAM(s) Oral daily  hydrALAZINE 100 milliGRAM(s) Oral every 8 hours  isosorbide   dinitrate Tablet (ISORDIL) 40 milliGRAM(s) Oral three times a day  metoprolol succinate ER 25 milliGRAM(s) Oral daily  milrinone Infusion 0.5 MICROgram(s)/kG/Min (11 mL/Hr) IV Continuous <Continuous>  nitroprusside Infusion 0.25 MICROgram(s)/kG/Min (2.73 mL/Hr) IV Continuous <Continuous>    ===================HEMATOLOGIC/ONC ===================  ?Secondary Polycythemia Vera   - elevated EPO, hgb in normal range  - Heme following peripherally  - f/u MELINA-2     AC therapy   - Heparin gtt for IABP, low ptt goal    heparin  Infusion 1200 Unit(s)/Hr (15 mL/Hr) IV Continuous <Continuous>    ===================== RENAL =========================  JAGRUTI on CKD II  Admitted w/ Cr 2.01 (baseline Cr ~1.3) now rising with inotropic support; 1.14 ->1.22  - likely i/s/o cardiogenic shock given RHC findings of CI 1.88  - Avoid nephrotoxic agents, NSAIDs. Renally dose meds.  - monitor strict I/Os and continue current cardiac support  - Continue monitoring urine output    ==================== GASTROINTESTINAL===================  Elevated Liver Enzymes:  -elevated AST/ALT, normal bilirubin, alk phos. statin d/c 6/13. ? related to amiodarone toxicity,    - Abd US on 6/14: Contracted gallbladder in the setting of recent meal. No acute cholecystitis.    Diet:   - Tolerating PO diet, Constant carb    Constipation:  - Cont. bowel regimen Miralax, Senna, PRN simethicone and bisacodyl   - s/p colonoscopy 6/3 with 5 polyps removed and biopsied, benign findings- GI recs for 3 yr follow up colo  - Protonix for GI prophylaxis    bisacodyl Suppository 10 milliGRAM(s) Rectal daily PRN Constipation  pantoprazole    Tablet 40 milliGRAM(s) Oral before breakfast  polyethylene glycol 3350 17 Gram(s) Oral daily  senna 1 Tablet(s) Oral at bedtime  simethicone 80 milliGRAM(s) Chew every 6 hours PRN Gas    =======================    ENDOCRINE  =====================  T2DM (A1C 6.2 on admission)  - Cont. ISS, glucose controlled, monitor FS closely     Gout flare, Left knee (previously R)  - continue allopurinol, s/p prednisone    allopurinol 100 milliGRAM(s) Oral daily  insulin lispro (ADMELOG) corrective regimen sliding scale   SubCutaneous at bedtime  insulin lispro (ADMELOG) corrective regimen sliding scale   SubCutaneous three times a day before meals    ========================INFECTIOUS DISEASE================  RUE Thrombophlebitis  - RUE US Duplex showing superficial thrombus  - s/p course of ancef    Febrile 6/7-8 overnight x1   - Temp 99.7 F  - UA neg, BCx on 6/7 NGTD x2, RVP neg   - follow fever curve, WBC    65 y/o male w/ PMHx HTN, HLD, type 2 DM, chronic HFrEF (EF 25-30% by Echo), complete heart block s/p PPM (in 2006, upgraded to BiV-ICD in 09/2016), CAD (s/p recent BERNABE mid LAD at [facility] on 04/18/2022), and stage II CKD (baseline Cr ~1.3) presented with near syncope to OSH found to have 41 episodes of VT on device, s/p unsuccessful VT ablation and transferred to [facility] for management of cardiogenic shock and advanced therapy eval.       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   - monitor hemodynamics and perfusion indices  - HF following, listed for OHT Status 2E    Relevant studies:  - TTE 5/21: LV 5.2 cm, LVEF 10-15%, LVOT VTI 10 cm, moderate RV dysfunction, mild AI, minimal MR  - TTE on 5/27: EF 15% Severe global LV systolic dysfunction. RV enlargement with decreased RV systolic function.  - C 5/23: patent mLAD stent with slow flow, D1 with 40-50% stenosis  - RHC 5/26: RA 12, PA 70/40 (50), PCWP 22, Milana CO/CI 2.3/1.3, MAP 83 with SVR 2469, PVR 12 PERSAUD  - 5/27: RA 10, PA 76/35 (49), PCWP 34, PA sat 57% with Milana CO/CI 3.1/1.6, MAP 71 with SVR 1574, PVR 4.8  - 6/6 RHC: CVP 17, RV 75/4, PA 80/36, PCW 22, CI 1.88. IABP placed and pt transferred to CICU.      VTach   - s/p EPS on 5/24, unable to perform ablation as no substrate found and did not induce VT because of severely low EF   - Interrogation of ICD @Dr Wilson's office 5/20: back-to-back episodes of VT @ 200+ bpm all terminating with ATP. Since last cath pt has had 41 VT episodes (all falling into VF zone)  - Normal device function. BIV pacing 97%. No programming changes made  - c/w Amiodarone 200 QD  - c/w Toprol 25 daily  - No further VT on inotropic support  - keep K 4-4.5  - EP consult 6/15 for atrial tachycardia, prior VT as assessment of amiodarone       CAD   - Chest pain free and compliant with DAPT since last PCI 4/18/22 per discussion with interventional at OSH, ok to DC plavix pending cardiac surgery, last dose 5/28  - Cardiac cath @[facility] 4/18/22: mLAD 90% s/p BERNABE, LCx mild disease, RCA mild disease s/p diagnostic cardiac cath w/ Dr Wagner on 05/23/2022   - C/w ASA, d/c lipitor for elevated LFTs, pending transplant    ===================HEMATOLOGIC/ONC ===================  ?Secondary Polycythemia Vera   - elevated EPO, hgb in normal range  - Heme following peripherally  - f/u MELINA-2     AC therapy   - Heparin gtt for IABP, low ptt goal    heparin  Infusion 1200 Unit(s)/Hr (15 mL/Hr) IV Continuous <Continuous>    ===================== RENAL =========================  JAGRUTI on CKD II  Admitted w/ Cr 2.01 (baseline Cr ~1.3) now rising with inotropic support; 1.14 ->1.22  - likely i/s/o cardiogenic shock given RHC findings of CI 1.88  - Avoid nephrotoxic agents, NSAIDs. Renally dose meds.  - monitor strict I/Os and continue current cardiac support  - Continue monitoring urine output    ==================== GASTROINTESTINAL===================  Elevated Liver Enzymes:  -elevated AST/ALT, normal bilirubin, alk phos. statin d/c 6/13. ? related to amiodarone toxicity,    - Abd US on 6/14: Contracted gallbladder in the setting of recent meal. No acute cholecystitis.    Diet:   - Tolerating PO diet, Constant carb    Constipation:  - Cont. bowel regimen Miralax, Senna, PRN simethicone and bisacodyl   - s/p colonoscopy 6/3 with 5 polyps removed and biopsied, benign findings- GI recs for 3 yr follow up colo  - Protonix for GI prophylaxis    bisacodyl Suppository 10 milliGRAM(s) Rectal daily PRN Constipation  pantoprazole    Tablet 40 milliGRAM(s) Oral before breakfast  polyethylene glycol 3350 17 Gram(s) Oral daily  senna 1 Tablet(s) Oral at bedtime  simethicone 80 milliGRAM(s) Chew every 6 hours PRN Gas    =======================    ENDOCRINE  =====================  T2DM (A1C 6.2 on admission)  - Cont. ISS, glucose controlled, monitor FS closely     Gout flare, Left knee (previously R)  - continue allopurinol, s/p prednisone  -6/15: with right wrist pain, left knee and left ankle pain possible 2/2 gout flare, f/u xray wrist     allopurinol 100 milliGRAM(s) Oral daily  insulin lispro (ADMELOG) corrective regimen sliding scale   SubCutaneous at bedtime  insulin lispro (ADMELOG) corrective regimen sliding scale   SubCutaneous three times a day before meals    ========================INFECTIOUS DISEASE================  RUE Thrombophlebitis  - RUE US Duplex showing superficial thrombus  - s/p course of ancef    Febrile 6/7-8 overnight x1   - Temp 99.7 F  - UA neg, BCx on 6/7 NGTD x2, RVP neg   - follow fever curve, WBC    65 y/o male w/ PMHx HTN, HLD, type 2 DM, chronic HFrEF (EF 25-30% by Echo), complete heart block s/p PPM (in 2006, upgraded to BiV-ICD in 09/2016), CAD (s/p recent BERNABE mid LAD at [facility] on 04/18/2022), and stage II CKD (baseline Cr ~1.3) presented with near syncope to OSH found to have 41 episodes of VT on device, s/p unsuccessful VT ablation and transferred to [facility] for management of cardiogenic shock and advanced therapy eval.       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   - monitor hemodynamics and perfusion indices  - HF following, listed for OHT Status 2E    Relevant studies:  - TTE 5/21: LV 5.2 cm, LVEF 10-15%, LVOT VTI 10 cm, moderate RV dysfunction, mild AI, minimal MR  - TTE on 5/27: EF 15% Severe global LV systolic dysfunction. RV enlargement with decreased RV systolic function.  - C 5/23: patent mLAD stent with slow flow, D1 with 40-50% stenosis  - RHC 5/26: RA 12, PA 70/40 (50), PCWP 22, Milana CO/CI 2.3/1.3, MAP 83 with SVR 2469, PVR 12 PERSAUD  - 5/27: RA 10, PA 76/35 (49), PCWP 34, PA sat 57% with Milana CO/CI 3.1/1.6, MAP 71 with SVR 1574, PVR 4.8  - 6/6 RHC: CVP 17, RV 75/4, PA 80/36, PCW 22, CI 1.88. IABP placed and pt transferred to CICU.      VTach   - s/p EPS on 5/24, unable to perform ablation as no substrate found and did not induce VT because of severely low EF   - Interrogation of ICD @Dr Wilson's office 5/20: back-to-back episodes of VT @ 200+ bpm all terminating with ATP. Since last cath pt has had 41 VT episodes (all falling into VF zone)  - Normal device function. BIV pacing 97%. No programming changes made  - c/w Amiodarone 200 QD  - c/w Toprol 25 daily  - No further VT on inotropic support  - keep K 4-4.5  - EP consult 6/15 for atrial tachycardia, prior VT as assessment of amiodarone       CAD   - Chest pain free and compliant with DAPT since last PCI 4/18/22 per discussion with interventional at OSH, ok to DC plavix pending cardiac surgery, last dose 5/28  - Cardiac [facility] 4/18/22: mLAD 90% s/p BERNABE, LCx mild disease, RCA mild disease s/p diagnostic cardiac cath w/ Dr Wagner on 05/23/2022   - C/w ASA, d/c lipitor for elevated LFTs, pending transplant    ===================HEMATOLOGIC/ONC ===================  ?Secondary Polycythemia Vera   - elevated EPO, hgb in normal range  - Heme following peripherally  - f/u MELINA-2     AC therapy   - Heparin gtt for IABP    heparin  Infusion 1200 Unit(s)/Hr (15 mL/Hr) IV Continuous <Continuous>    ===================== RENAL =========================  JAGRUTI on CKD II  Admitted w/ Cr 2.01 (baseline Cr ~1.3) now rising with inotropic support; 1.14 ->1.22  - likely i/s/o cardiogenic shock given RHC findings of CI 1.88  - Avoid nephrotoxic agents, NSAIDs. Renally dose meds.  - monitor strict I/Os and continue current cardiac support  - Continue monitoring urine output    ==================== GASTROINTESTINAL===================  Elevated Liver Enzymes:  -elevated AST/ALT, normal bilirubin, alk phos. statin d/c 6/13. ? related to amiodarone toxicity,    - Abd US on 6/14: Contracted gallbladder in the setting of recent meal. No acute cholecystitis.    Diet:   - Tolerating PO diet, Constant carb    Constipation:  - Cont. bowel regimen Miralax, Senna, PRN simethicone and bisacodyl   - s/p colonoscopy 6/3 with 5 polyps removed and biopsied, benign findings- GI recs for 3 yr follow up colo  - Protonix for GI prophylaxis    bisacodyl Suppository 10 milliGRAM(s) Rectal daily PRN Constipation  pantoprazole    Tablet 40 milliGRAM(s) Oral before breakfast  polyethylene glycol 3350 17 Gram(s) Oral daily  senna 1 Tablet(s) Oral at bedtime  simethicone 80 milliGRAM(s) Chew every 6 hours PRN Gas    =======================    ENDOCRINE  =====================  T2DM (A1C 6.2 on admission)  - Cont. ISS, glucose controlled, monitor FS closely     Gout flare, Left knee (previously R)  - continue allopurinol, s/p prednisone  -6/15: with right wrist pain, left knee and left ankle pain possible 2/2 gout flare, f/u xray wrist     allopurinol 100 milliGRAM(s) Oral daily  insulin lispro (ADMELOG) corrective regimen sliding scale   SubCutaneous at bedtime  insulin lispro (ADMELOG) corrective regimen sliding scale   SubCutaneous three times a day before meals    ========================INFECTIOUS DISEASE================  RUE Thrombophlebitis  - RUE US Duplex showing superficial thrombus  - s/p course of ancef    Febrile 6/7-8 overnight x1   - Temp 99.7 F  - UA neg, BCx on 6/7 NGTD x2, RVP neg   - follow fever curve, WBC

## 2022-06-15 NOTE — CONSULT NOTE ADULT - SUBJECTIVE AND OBJECTIVE BOX
ELECTROPHYSIOLOGY FELLOW CONSULT NOTE    HPI:    65 yo man PMHx of ischemic and nonischemic mixed CM w/ BiV failure w/ EF 15% s/p CRT-D 2016, CAD s/p BERNABE to LAD 2021, and VT who was admitted to [facility] with syncope 2/2 VT, s/p LHC without obstructive CAD, s/p RHC with CI of 1.6, and EPS w/ no substrate found (did not induce VT due to severely reduced EF). Transferred to [facility] 05/26 for advanced therapies eval, now UNOS status 2e on milrinone and IABP.     PMHx:   AV block  Essential hypertension  Pure hypercholesterolemia  Myocardial infarct, old  Chronic HFrEF (heart failure with reduced ejection fraction)  Chronic kidney disease, unspecified CKD stage  CAD (coronary artery disease)  HLD (hyperlipidemia)  Type 2 diabetes mellitus    PSHx:   Artificial cardiac pacemaker    Allergies:  No Known Allergies    Current Medications:   acetaminophen     Tablet .. 650 milliGRAM(s) Oral every 6 hours PRN  allopurinol 100 milliGRAM(s) Oral daily  aMIOdarone    Tablet 200 milliGRAM(s) Oral daily  aspirin enteric coated 81 milliGRAM(s) Oral daily  bisacodyl Suppository 10 milliGRAM(s) Rectal daily PRN  chlorhexidine 2% Cloths 1 Application(s) Topical daily  chlorhexidine 4% Liquid 1 Application(s) Topical <User Schedule>  heparin  Infusion 1200 Unit(s)/Hr IV Continuous <Continuous>  hydrALAZINE 100 milliGRAM(s) Oral every 8 hours  insulin lispro (ADMELOG) corrective regimen sliding scale   SubCutaneous at bedtime  insulin lispro (ADMELOG) corrective regimen sliding scale   SubCutaneous three times a day before meals  isosorbide   dinitrate Tablet (ISORDIL) 40 milliGRAM(s) Oral three times a day  lidocaine   4% Patch 1 Patch Transdermal daily  metoprolol succinate ER 25 milliGRAM(s) Oral daily  milrinone Infusion 0.5 MICROgram(s)/kG/Min IV Continuous <Continuous>  nitroprusside Infusion 0.25 MICROgram(s)/kG/Min IV Continuous <Continuous>  pantoprazole    Tablet 40 milliGRAM(s) Oral before breakfast  polyethylene glycol 3350 17 Gram(s) Oral daily  senna 1 Tablet(s) Oral at bedtime  simethicone 80 milliGRAM(s) Chew every 6 hours PRN  sodium chloride 0.65% Nasal 1 Spray(s) Both Nostrils every 6 hours PRN    FAMILY HISTORY:  Family history of acute myocardial infarction (Father)  72    Social History: none pertinent    REVIEW OF SYSTEMS:  CONSTITUTIONAL: No weakness, fevers or chills  EYES/ENT: No visual changes;  No dysphagia  NECK: No pain or stiffness  RESPIRATORY: No cough, wheezing, hemoptysis; No shortness of breath  CARDIOVASCULAR: No chest pain or palpitations; No lower extremity edema  GASTROINTESTINAL: No abdominal or epigastric pain. No nausea, vomiting, or hematemesis; No diarrhea or constipation. No melena or hematochezia.  BACK: No back pain  GENITOURINARY: No dysuria, frequency or hematuria  NEUROLOGICAL: No numbness or weakness  SKIN: No itching, burning, rashes, or lesions   All other review of systems is negative unless indicated above.    Physical Exam:  T(F): 98.6 (06-15), Max: 99.7 (06-14)  HR: 80 (06-15) (80 - 110)  BP: --  RR: 26 (06-15)  SpO2: 92% (06-15)  GENERAL: No acute distress, well-developed  HEAD:  Atraumatic, Normocephalic  ENT: EOMI, PERRLA, conjunctiva and sclera clear, Neck supple, No JVD, moist mucosa  CHEST/LUNG: Clear to auscultation bilaterally; No wheeze, equal breath sounds bilaterally   BACK: No spinal tenderness  HEART: Regular rate and rhythm; No murmurs, rubs, or gallops  ABDOMEN: Soft, Nontender, Nondistended; Bowel sounds present  EXTREMITIES:  No clubbing, cyanosis, or edema  PSYCH: Nl behavior, nl affect  NEUROLOGY: AAOx3, non-focal, cranial nerves intact  SKIN: Normal color, No rashes or lesions. IABP.    ECG: Personally reviewed    CXR: Personally reviewed    Labs: Personally reviewed                        12.4   10.91 )-----------( 216      ( 15 Gus 2022 04:39 )             37.3     06-15    129<L>  |  96  |  26<H>  ----------------------------<  135<H>  4.6   |  20<L>  |  1.44<H>    Ca    9.1      15 Gus 2022 04:38  Phos  3.7     06-15  Mg     2.0     06-15    TPro  6.6  /  Alb  3.0<L>  /  TBili  0.6  /  DBili  x   /  AST  36  /  ALT  55<H>  /  AlkPhos  87  06-15    PT/INR - ( 15 Gus 2022 04:39 )   PT: 13.7 sec;   INR: 1.19 ratio         PTT - ( 15 Gus 2022 04:39 )  PTT:51.1 sec

## 2022-06-15 NOTE — PROGRESS NOTE ADULT - SUBJECTIVE AND OBJECTIVE BOX
Authored by Sumi Pantoja MD, PGY2  PATIENT:  LEN[facility] GOCOOL  [id number]    CHIEF COMPLAINT:  Patient is a 66y old  Male who presents with a chief complaint of LVAD/OHT eval (2022 23:11)      INTERVAL HISTORY OVERNIGHT EVENTS: Nipride gtt d/c.         MEDICATIONS:  MEDICATIONS  (STANDING):  allopurinol 100 milliGRAM(s) Oral daily  aMIOdarone    Tablet 200 milliGRAM(s) Oral daily  aspirin enteric coated 81 milliGRAM(s) Oral daily  chlorhexidine 2% Cloths 1 Application(s) Topical daily  chlorhexidine 4% Liquid 1 Application(s) Topical <User Schedule>  heparin  Infusion 1200 Unit(s)/Hr (15 mL/Hr) IV Continuous <Continuous>  hydrALAZINE 100 milliGRAM(s) Oral every 8 hours  insulin lispro (ADMELOG) corrective regimen sliding scale   SubCutaneous at bedtime  insulin lispro (ADMELOG) corrective regimen sliding scale   SubCutaneous three times a day before meals  isosorbide   dinitrate Tablet (ISORDIL) 40 milliGRAM(s) Oral three times a day  lidocaine   4% Patch 1 Patch Transdermal daily  metoprolol succinate ER 25 milliGRAM(s) Oral daily  milrinone Infusion 0.5 MICROgram(s)/kG/Min (11 mL/Hr) IV Continuous <Continuous>  nitroprusside Infusion 0.25 MICROgram(s)/kG/Min (2.73 mL/Hr) IV Continuous <Continuous>  pantoprazole    Tablet 40 milliGRAM(s) Oral before breakfast  polyethylene glycol 3350 17 Gram(s) Oral daily  senna 1 Tablet(s) Oral at bedtime    MEDICATIONS  (PRN):  acetaminophen     Tablet .. 650 milliGRAM(s) Oral every 6 hours PRN Temp greater or equal to 38C (100.4F), Mild Pain (1 - 3)  bisacodyl Suppository 10 milliGRAM(s) Rectal daily PRN Constipation  simethicone 80 milliGRAM(s) Chew every 6 hours PRN Gas  sodium chloride 0.65% Nasal 1 Spray(s) Both Nostrils every 6 hours PRN Nasal Congestion      ALLERGIES:  Allergies    No Known Allergies    Intolerances        OBJECTIVE:  ICU Vital Signs Last 24 Hrs  T(C): 36.6 (15 Gus 2022 00:00), Max: 37.6 (2022 20:00)  T(F): 97.8 (15 Gus 2022 00:00), Max: 99.7 (2022 20:00)  HR: 99 (15 Gus 2022 07:00) (80 - 112)  BP: --  BP(mean): --  ABP: --  ABP(mean): --  RR: 27 (15 Gus 2022 07:00) (16 - 28)  SpO2: 95% (15 Gus 2022 07:00) (90% - 97%)        CAPILLARY BLOOD GLUCOSE      POCT Blood Glucose.: 172 mg/dL (2022 21:53)  POCT Blood Glucose.: 129 mg/dL (2022 17:27)  POCT Blood Glucose.: 156 mg/dL (2022 12:12)  POCT Blood Glucose.: 155 mg/dL (2022 08:39)    CAPILLARY BLOOD GLUCOSE      POCT Blood Glucose.: 172 mg/dL (2022 21:53)    I&O's Summary    2022 07:01  -  15 Gus 2022 07:00  --------------------------------------------------------  IN: 1686.9 mL / OUT: 2350 mL / NET: -663.1 mL      Daily     Daily Weight in k.6 (15 Gus 2022 05:00)    PHYSICAL EXAMINATION:  Appearance: Normal, NAD  HEAD:  Normocephalic  EYES:  PERRLA, conjunctiva and sclera clear  NECK: Supple, No JVD  CHEST/LUNG: Clear to auscultation bilaterally; No wheezing  HEART: Normal S1, S2. No murmurs, rubs, or gallops  ABDOMEN: + Bowel sounds, Soft, NT, ND   EXTREMITIES:  2+ Peripheral Pulses, No clubbing, cyanosis, or edema  NEUROLOGY: non-focal, aaox3  SKIN: No rashes or lesions          LABS:                          12.4   10.91 )-----------( 216      ( 15 Gus 2022 04:39 )             37.3     06-15    129<L>  |  96  |  26<H>  ----------------------------<  135<H>  4.6   |  20<L>  |  1.44<H>    Ca    9.1      15 Gus 2022 04:38  Phos  3.7     -15  Mg     2.0     06-15    TPro  6.6  /  Alb  3.0<L>  /  TBili  0.6  /  DBili  x   /  AST  36  /  ALT  55<H>  /  AlkPhos  87  -15    LIVER FUNCTIONS - ( 15 Gus 2022 04:38 )  Alb: 3.0 g/dL / Pro: 6.6 g/dL / ALK PHOS: 87 U/L / ALT: 55 U/L / AST: 36 U/L / GGT: x           PT/INR - ( 15 Gus 2022 04:39 )   PT: 13.7 sec;   INR: 1.19 ratio         PTT - ( 15 Gus 2022 04:39 )  PTT:51.1 sec            TELEMETRY:     EKG:     IMAGING:  < from: US Abdomen Complete (US Abdomen Complete .) (22 @ 14:47) >    FINDINGS:  Liver: Within normal limits.  Bile ducts: Normal caliber. Common bile duct measures 5 mm.  Gallbladder: Contracted gallbladder in the setting of recent meal.  Pancreas: Visualized portions are within normal limits.  Spleen: 8.7 cm. Within normal limits.  Right kidney: 10.8 cm. No hydronephrosis.  Left kidney: 10.1 cm. No hydronephrosis.  Ascites: None.  Aorta and IVC: Partially visualized intra-aortic balloon pump catheter.    IMPRESSION:  Contracted gallbladder in the setting of recent meal. No acute   cholecystitis.    --- End of Report ---      < end of copied text >       Authored by Sumi Pantoja MD, PGY2  PATIENT:  LEN[facility]X GOCOOL  [id number]    CHIEF COMPLAINT:  Patient is a 66y old  Male who presents with a chief complaint of LVAD/OHT eval (2022 23:11)      INTERVAL HISTORY OVERNIGHT EVENTS: Nipride gtt d/c overnight for assisted mean in 60s, goal 70s-80s. Patient with acute on chronic knee pain, b/l, left knee pain overnight, received tylenol with improvement.         MEDICATIONS:  MEDICATIONS  (STANDING):  allopurinol 100 milliGRAM(s) Oral daily  aMIOdarone    Tablet 200 milliGRAM(s) Oral daily  aspirin enteric coated 81 milliGRAM(s) Oral daily  chlorhexidine 2% Cloths 1 Application(s) Topical daily  chlorhexidine 4% Liquid 1 Application(s) Topical <User Schedule>  heparin  Infusion 1200 Unit(s)/Hr (15 mL/Hr) IV Continuous <Continuous>  hydrALAZINE 100 milliGRAM(s) Oral every 8 hours  insulin lispro (ADMELOG) corrective regimen sliding scale   SubCutaneous at bedtime  insulin lispro (ADMELOG) corrective regimen sliding scale   SubCutaneous three times a day before meals  isosorbide   dinitrate Tablet (ISORDIL) 40 milliGRAM(s) Oral three times a day  lidocaine   4% Patch 1 Patch Transdermal daily  metoprolol succinate ER 25 milliGRAM(s) Oral daily  milrinone Infusion 0.5 MICROgram(s)/kG/Min (11 mL/Hr) IV Continuous <Continuous>  nitroprusside Infusion 0.25 MICROgram(s)/kG/Min (2.73 mL/Hr) IV Continuous <Continuous>  pantoprazole    Tablet 40 milliGRAM(s) Oral before breakfast  polyethylene glycol 3350 17 Gram(s) Oral daily  senna 1 Tablet(s) Oral at bedtime    MEDICATIONS  (PRN):  acetaminophen     Tablet .. 650 milliGRAM(s) Oral every 6 hours PRN Temp greater or equal to 38C (100.4F), Mild Pain (1 - 3)  bisacodyl Suppository 10 milliGRAM(s) Rectal daily PRN Constipation  simethicone 80 milliGRAM(s) Chew every 6 hours PRN Gas  sodium chloride 0.65% Nasal 1 Spray(s) Both Nostrils every 6 hours PRN Nasal Congestion      ALLERGIES:  Allergies    No Known Allergies    Intolerances        OBJECTIVE:  ICU Vital Signs Last 24 Hrs  T(C): 36.6 (15 Gus 2022 00:00), Max: 37.6 (2022 20:00)  T(F): 97.8 (15 Gus 2022 00:00), Max: 99.7 (2022 20:00)  HR: 99 (15 Gus 2022 07:00) (80 - 112)  BP: --  BP(mean): --  ABP: --  ABP(mean): --  RR: 27 (15 Gus 2022 07:00) (16 - 28)  SpO2: 95% (15 Gus 2022 07:00) (90% - 97%)        CAPILLARY BLOOD GLUCOSE      POCT Blood Glucose.: 172 mg/dL (2022 21:53)  POCT Blood Glucose.: 129 mg/dL (2022 17:27)  POCT Blood Glucose.: 156 mg/dL (2022 12:12)  POCT Blood Glucose.: 155 mg/dL (2022 08:39)    CAPILLARY BLOOD GLUCOSE      POCT Blood Glucose.: 172 mg/dL (2022 21:53)    I&O's Summary    2022 07:01  -  15 Gus 2022 07:00  --------------------------------------------------------  IN: 1686.9 mL / OUT: 2350 mL / NET: -663.1 mL      Daily     Daily Weight in k.6 (15 Gus 2022 05:00)    PHYSICAL EXAMINATION:  Appearance: Normal, NAD  HEAD:  Normocephalic  EYES:  PERRLA, conjunctiva and sclera clear  NECK: Supple, No JVD  CHEST/LUNG: Clear to auscultation bilaterally; No wheezing  HEART: Normal S1, S2. No murmurs, rubs, or gallops  ABDOMEN: + Bowel sounds, Soft, NT, ND   EXTREMITIES:  2+ Peripheral Pulses, No clubbing, cyanosis, or edema. b/l Knees equal in temperature, no effusions noted, patient does not want to flex knee.   NEUROLOGY: non-focal, aaox3  SKIN: No rashes or lesions          LABS:                          12.4   10.91 )-----------( 216      ( 15 Gsu 2022 04:39 )             37.3     06-15    129<L>  |  96  |  26<H>  ----------------------------<  135<H>  4.6   |  20<L>  |  1.44<H>    Ca    9.1      15 Gus 2022 04:38  Phos  3.7     06-15  Mg     2.0     06-15    TPro  6.6  /  Alb  3.0<L>  /  TBili  0.6  /  DBili  x   /  AST  36  /  ALT  55<H>  /  AlkPhos  87  06-15    LIVER FUNCTIONS - ( 15 Gus 2022 04:38 )  Alb: 3.0 g/dL / Pro: 6.6 g/dL / ALK PHOS: 87 U/L / ALT: 55 U/L / AST: 36 U/L / GGT: x           PT/INR - ( 15 Gus 2022 04:39 )   PT: 13.7 sec;   INR: 1.19 ratio         PTT - ( 15 Gus 2022 04:39 )  PTT:51.1 sec            TELEMETRY:     EKG:     IMAGING:  < from: US Abdomen Complete (US Abdomen Complete .) (22 @ 14:47) >    FINDINGS:  Liver: Within normal limits.  Bile ducts: Normal caliber. Common bile duct measures 5 mm.  Gallbladder: Contracted gallbladder in the setting of recent meal.  Pancreas: Visualized portions are within normal limits.  Spleen: 8.7 cm. Within normal limits.  Right kidney: 10.8 cm. No hydronephrosis.  Left kidney: 10.1 cm. No hydronephrosis.  Ascites: None.  Aorta and IVC: Partially visualized intra-aortic balloon pump catheter.    IMPRESSION:  Contracted gallbladder in the setting of recent meal. No acute   cholecystitis.    --- End of Report ---      < end of copied text >       Authored by Sumi Pantoja MD, PGY2  PATIENT:  [facility] GOCOOL  [id number]    CHIEF COMPLAINT:  Patient is a 66y old  Male who presents with a chief complaint of LVAD/OHT eval (2022 23:11)      INTERVAL HISTORY OVERNIGHT EVENTS: Nipride gtt d/c overnight for assisted mean in 60s, goal 70s-80s. Resumed this AM as assisted mean again in 80s. Patient with acute on chronic knee pain, b/l, left knee pain overnight, received tylenol with improvement. Tele with multiple runs of a-tach.         MEDICATIONS:  MEDICATIONS  (STANDING):  allopurinol 100 milliGRAM(s) Oral daily  aMIOdarone    Tablet 200 milliGRAM(s) Oral daily  aspirin enteric coated 81 milliGRAM(s) Oral daily  chlorhexidine 2% Cloths 1 Application(s) Topical daily  chlorhexidine 4% Liquid 1 Application(s) Topical <User Schedule>  heparin  Infusion 1200 Unit(s)/Hr (15 mL/Hr) IV Continuous <Continuous>  hydrALAZINE 100 milliGRAM(s) Oral every 8 hours  insulin lispro (ADMELOG) corrective regimen sliding scale   SubCutaneous at bedtime  insulin lispro (ADMELOG) corrective regimen sliding scale   SubCutaneous three times a day before meals  isosorbide   dinitrate Tablet (ISORDIL) 40 milliGRAM(s) Oral three times a day  lidocaine   4% Patch 1 Patch Transdermal daily  metoprolol succinate ER 25 milliGRAM(s) Oral daily  milrinone Infusion 0.5 MICROgram(s)/kG/Min (11 mL/Hr) IV Continuous <Continuous>  nitroprusside Infusion 0.25 MICROgram(s)/kG/Min (2.73 mL/Hr) IV Continuous <Continuous>  pantoprazole    Tablet 40 milliGRAM(s) Oral before breakfast  polyethylene glycol 3350 17 Gram(s) Oral daily  senna 1 Tablet(s) Oral at bedtime    MEDICATIONS  (PRN):  acetaminophen     Tablet .. 650 milliGRAM(s) Oral every 6 hours PRN Temp greater or equal to 38C (100.4F), Mild Pain (1 - 3)  bisacodyl Suppository 10 milliGRAM(s) Rectal daily PRN Constipation  simethicone 80 milliGRAM(s) Chew every 6 hours PRN Gas  sodium chloride 0.65% Nasal 1 Spray(s) Both Nostrils every 6 hours PRN Nasal Congestion      ALLERGIES:  Allergies    No Known Allergies    Intolerances        OBJECTIVE:  ICU Vital Signs Last 24 Hrs  T(C): 36.6 (15 Gus 2022 00:00), Max: 37.6 (2022 20:00)  T(F): 97.8 (15 Gus 2022 00:00), Max: 99.7 (2022 20:00)  HR: 99 (15 Gus 2022 07:00) (80 - 112)  BP: --  BP(mean): --  ABP: --  ABP(mean): --  RR: 27 (15 Gus 2022 07:00) (16 - 28)  SpO2: 95% (15 Gus 2022 07:00) (90% - 97%)        CAPILLARY BLOOD GLUCOSE      POCT Blood Glucose.: 172 mg/dL (2022 21:53)  POCT Blood Glucose.: 129 mg/dL (2022 17:27)  POCT Blood Glucose.: 156 mg/dL (2022 12:12)  POCT Blood Glucose.: 155 mg/dL (2022 08:39)    CAPILLARY BLOOD GLUCOSE      POCT Blood Glucose.: 172 mg/dL (2022 21:53)    I&O's Summary    2022 07:01  -  15 Gus 2022 07:00  --------------------------------------------------------  IN: 1686.9 mL / OUT: 2350 mL / NET: -663.1 mL      Daily     Daily Weight in k.6 (15 Gus 2022 05:00)    PHYSICAL EXAMINATION:  Appearance: Normal, NAD  HEAD:  Normocephalic  EYES:  PERRLA, conjunctiva and sclera clear  NECK: Supple, No JVD  CHEST/LUNG: Clear to auscultation bilaterally; No wheezing  HEART: Normal S1, S2. No murmurs, rubs, or gallops  ABDOMEN: + Bowel sounds, Soft, NT, ND   EXTREMITIES:  2+ Peripheral Pulses, No clubbing, cyanosis, or edema. b/l Knees equal in temperature, no effusions noted, patient does not want to flex knee.   NEUROLOGY: non-focal, aaox3  SKIN: No rashes or lesions          LABS:                          12.4   10.91 )-----------( 216      ( 15 Gus 2022 04:39 )             37.3     06-15    129<L>  |  96  |  26<H>  ----------------------------<  135<H>  4.6   |  20<L>  |  1.44<H>    Ca    9.1      15 Gus 2022 04:38  Phos  3.7     06-15  Mg     2.0     06-15    TPro  6.6  /  Alb  3.0<L>  /  TBili  0.6  /  DBili  x   /  AST  36  /  ALT  55<H>  /  AlkPhos  87  -15    LIVER FUNCTIONS - ( 15 Gus 2022 04:38 )  Alb: 3.0 g/dL / Pro: 6.6 g/dL / ALK PHOS: 87 U/L / ALT: 55 U/L / AST: 36 U/L / GGT: x           PT/INR - ( 15 Gus 2022 04:39 )   PT: 13.7 sec;   INR: 1.19 ratio         PTT - ( 15 Gus 2022 04:39 )  PTT:51.1 sec            TELEMETRY:     EKG:     IMAGING:  < from: US Abdomen Complete (US Abdomen Complete .) (22 @ 14:47) >    FINDINGS:  Liver: Within normal limits.  Bile ducts: Normal caliber. Common bile duct measures 5 mm.  Gallbladder: Contracted gallbladder in the setting of recent meal.  Pancreas: Visualized portions are within normal limits.  Spleen: 8.7 cm. Within normal limits.  Right kidney: 10.8 cm. No hydronephrosis.  Left kidney: 10.1 cm. No hydronephrosis.  Ascites: None.  Aorta and IVC: Partially visualized intra-aortic balloon pump catheter.    IMPRESSION:  Contracted gallbladder in the setting of recent meal. No acute   cholecystitis.    --- End of Report ---      < end of copied text >       Authored by Sumi Pantoja MD, PGY2  PATIENT:  [facility] GOCOOL  [id number]    CHIEF COMPLAINT:  Patient is a 66y old  Male who presents with a chief complaint of LVAD/OHT eval (2022 23:11)      INTERVAL HISTORY OVERNIGHT EVENTS: Nipride gtt d/c overnight for assisted mean in 60s, goal 70s-80s. Resumed this AM as assisted mean again in 80s. Patient with acute on chronic knee pain, b/l, left knee pain overnight, received tylenol with improvement. Tele with multiple runs of a-tach.         MEDICATIONS:  MEDICATIONS  (STANDING):  allopurinol 100 milliGRAM(s) Oral daily  aMIOdarone    Tablet 200 milliGRAM(s) Oral daily  aspirin enteric coated 81 milliGRAM(s) Oral daily  chlorhexidine 2% Cloths 1 Application(s) Topical daily  chlorhexidine 4% Liquid 1 Application(s) Topical <User Schedule>  heparin  Infusion 1200 Unit(s)/Hr (15 mL/Hr) IV Continuous <Continuous>  hydrALAZINE 100 milliGRAM(s) Oral every 8 hours  insulin lispro (ADMELOG) corrective regimen sliding scale   SubCutaneous at bedtime  insulin lispro (ADMELOG) corrective regimen sliding scale   SubCutaneous three times a day before meals  isosorbide   dinitrate Tablet (ISORDIL) 40 milliGRAM(s) Oral three times a day  lidocaine   4% Patch 1 Patch Transdermal daily  metoprolol succinate ER 25 milliGRAM(s) Oral daily  milrinone Infusion 0.5 MICROgram(s)/kG/Min (11 mL/Hr) IV Continuous <Continuous>  nitroprusside Infusion 0.25 MICROgram(s)/kG/Min (2.73 mL/Hr) IV Continuous <Continuous>  pantoprazole    Tablet 40 milliGRAM(s) Oral before breakfast  polyethylene glycol 3350 17 Gram(s) Oral daily  senna 1 Tablet(s) Oral at bedtime    MEDICATIONS  (PRN):  acetaminophen     Tablet .. 650 milliGRAM(s) Oral every 6 hours PRN Temp greater or equal to 38C (100.4F), Mild Pain (1 - 3)  bisacodyl Suppository 10 milliGRAM(s) Rectal daily PRN Constipation  simethicone 80 milliGRAM(s) Chew every 6 hours PRN Gas  sodium chloride 0.65% Nasal 1 Spray(s) Both Nostrils every 6 hours PRN Nasal Congestion      ALLERGIES:  Allergies    No Known Allergies    Intolerances        OBJECTIVE:  ICU Vital Signs Last 24 Hrs  T(C): 36.6 (15 Gus 2022 00:00), Max: 37.6 (2022 20:00)  T(F): 97.8 (15 Gus 2022 00:00), Max: 99.7 (2022 20:00)  HR: 99 (15 Gus 2022 07:00) (80 - 112)  BP: --  BP(mean): --  ABP: --  ABP(mean): --  RR: 27 (15 Gus 2022 07:00) (16 - 28)  SpO2: 95% (15 Gus 2022 07:00) (90% - 97%)        CAPILLARY BLOOD GLUCOSE      POCT Blood Glucose.: 172 mg/dL (2022 21:53)  POCT Blood Glucose.: 129 mg/dL (2022 17:27)  POCT Blood Glucose.: 156 mg/dL (2022 12:12)  POCT Blood Glucose.: 155 mg/dL (2022 08:39)    CAPILLARY BLOOD GLUCOSE      POCT Blood Glucose.: 172 mg/dL (2022 21:53)    I&O's Summary    2022 07:01  -  15 Gus 2022 07:00  --------------------------------------------------------  IN: 1686.9 mL / OUT: 2350 mL / NET: -663.1 mL      Daily     Daily Weight in k.6 (15 Gus 2022 05:00)    PHYSICAL EXAMINATION:  Appearance: Normal, NAD  HEAD:  Normocephalic  EYES:  PERRLA, conjunctiva and sclera clear  NECK: Supple, No JVD  CHEST/LUNG: Clear to auscultation bilaterally; No wheezing  HEART: Normal S1, S2. No rubs, or gallops  ABDOMEN: + Bowel sounds, Soft, NT, ND   EXTREMITIES:  2+ Peripheral Pulses, No clubbing, cyanosis, or edema. b/l Knees equal in temperature, no effusions noted, patient does not want to flex knee.   NEUROLOGY: non-focal, aaox3  SKIN: No rashes or lesions          LABS:                          12.4   10.91 )-----------( 216      ( 15 Gus 2022 04:39 )             37.3     06-15    129<L>  |  96  |  26<H>  ----------------------------<  135<H>  4.6   |  20<L>  |  1.44<H>    Ca    9.1      15 Gus 2022 04:38  Phos  3.7     06-15  Mg     2.0     -15    TPro  6.6  /  Alb  3.0<L>  /  TBili  0.6  /  DBili  x   /  AST  36  /  ALT  55<H>  /  AlkPhos  87  -15    LIVER FUNCTIONS - ( 15 Gus 2022 04:38 )  Alb: 3.0 g/dL / Pro: 6.6 g/dL / ALK PHOS: 87 U/L / ALT: 55 U/L / AST: 36 U/L / GGT: x           PT/INR - ( 15 Gus 2022 04:39 )   PT: 13.7 sec;   INR: 1.19 ratio         PTT - ( 15 Gus 2022 04:39 )  PTT:51.1 sec            TELEMETRY:     EKG:     IMAGING:  < from: US Abdomen Complete (US Abdomen Complete .) (22 @ 14:47) >    FINDINGS:  Liver: Within normal limits.  Bile ducts: Normal caliber. Common bile duct measures 5 mm.  Gallbladder: Contracted gallbladder in the setting of recent meal.  Pancreas: Visualized portions are within normal limits.  Spleen: 8.7 cm. Within normal limits.  Right kidney: 10.8 cm. No hydronephrosis.  Left kidney: 10.1 cm. No hydronephrosis.  Ascites: None.  Aorta and IVC: Partially visualized intra-aortic balloon pump catheter.    IMPRESSION:  Contracted gallbladder in the setting of recent meal. No acute   cholecystitis.    --- End of Report ---      < end of copied text >

## 2022-06-15 NOTE — CONSULT NOTE ADULT - ASSESSMENT
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 Transferred to [facility] 05/26 for advanced therapies eval, now UNOS status 2e on milrinone and IABP.     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 with syncope 2/2 VT, s/p LHC without obstructive CAD, s/p RHC with CI of 1.6, and EPS w/ no substrate found (did not induce VT due to severely reduced EF). Transferred to [facility] 05/26 for advanced therapies eval, now UNOS status 2e on milrinone and IABP.     #Hx of VT  #NSVT  - Repeat CRT-D interrogation without recorded sustained VT/VF event  - Continue toprol XL 25mg daily and amio 200mg daily    Signed by:  Christie Castorena MD  PGY5 Cardiology

## 2022-06-15 NOTE — PROGRESS NOTE ADULT - NS MD NEURO CONDITIONS_RENAL
JAGRUTI/CKD Stage 2
JAGRUTI
CKD Stage 3

## 2022-06-15 NOTE — CHART NOTE - NSCHARTNOTEFT_GEN_A_CORE
Nutrition Follow Up Note  Patient seen for: consult    Chart reviewed, events noted. Per chart: 66M, PMH of ACC/AHA Stage D mixed NICM/ICM, well controlled DM2 (A1c 6.2%), and CKD III. admitted with cardiogenic shock and many episodes of VT. Underwent evaluation for advanced therapies. Found to have elevated pulmonary pressures but better on inotropes but he remained inadequately supported. IABP placed 2022 and was listed for heart transplant UNOS status 2E.     Source: pt, RN, interdisciplinary team, EMR    Diet, Consistent Carbohydrate w/Evening Snack:   Low Sodium (22 @ 11:13)    Is current diet order appropriate/adequate? [x] Yes  []  No:     PO intake :   [x] >75%  Adequate    [] 50-75%  Fair       [] <50%  Poor   - Pt reports good appetite and PO intake. Requested visit with RD to discuss food preferences. RD to remove green beans and pea soup from diet per pt preference as he believes this is causing his gout to flare.     Nutrition-Related Events:  - Continues on milrinone gtt  - Insulin Sliding Scale ordered to optimize BG  - Ongoing diet education in setting of AT evaluation    GI:  Last BM .   Bowel Regimen? [x] Yes (Miralax, senna, dulcolax - PRN)    Weight in kG: Daily Weight in k.6 (-15), Weight in k.5 (-), Weight in k.9 (), Weight in k.9 (-), Weight in k.9 (), Weight in k.1 (06-10), Weight in k.4 ()   - weight fluctuations noted, may be 2/2 fluid shifts - will continue to monitor    Admit Dosing Weight: 162 Ibs/73.5 kg (-)  Height: 5'10" (177.8 cm)  BMI (kg/m2): 23.3 (-)  IBW: 166 Ibs/75.3 kg  UBW PTA: ~168 lbs/76.2 kg    MEDICATIONS  (STANDING):  allopurinol 100 milliGRAM(s) Oral daily  aMIOdarone    Tablet 200 milliGRAM(s) Oral daily  aspirin enteric coated 81 milliGRAM(s) Oral daily  chlorhexidine 2% Cloths 1 Application(s) Topical daily  chlorhexidine 4% Liquid 1 Application(s) Topical <User Schedule>  heparin  Infusion 1200 Unit(s)/Hr (15 mL/Hr) IV Continuous <Continuous>  hydrALAZINE 100 milliGRAM(s) Oral every 8 hours  insulin lispro (ADMELOG) corrective regimen sliding scale   SubCutaneous at bedtime  insulin lispro (ADMELOG) corrective regimen sliding scale   SubCutaneous three times a day before meals  isosorbide   dinitrate Tablet (ISORDIL) 40 milliGRAM(s) Oral three times a day  lidocaine   4% Patch 1 Patch Transdermal daily  metoprolol succinate ER 25 milliGRAM(s) Oral daily  milrinone Infusion 0.5 MICROgram(s)/kG/Min (11 mL/Hr) IV Continuous <Continuous>  nitroprusside Infusion 0.25 MICROgram(s)/kG/Min (2.73 mL/Hr) IV Continuous <Continuous>  pantoprazole    Tablet 40 milliGRAM(s) Oral before breakfast  polyethylene glycol 3350 17 Gram(s) Oral daily  senna 1 Tablet(s) Oral at bedtime    MEDICATIONS  (PRN):  acetaminophen     Tablet .. 650 milliGRAM(s) Oral every 6 hours PRN Temp greater or equal to 38C (100.4F), Mild Pain (1 - 3)  bisacodyl Suppository 10 milliGRAM(s) Rectal daily PRN Constipation  simethicone 80 milliGRAM(s) Chew every 6 hours PRN Gas  sodium chloride 0.65% Nasal 1 Spray(s) Both Nostrils every 6 hours PRN Nasal Congestion    Pertinent Labs: 06-15 @ 04:38: Na 129<L>, BUN 26<H>, Cr 1.44<H>, <H>, K+ 4.6, Phos 3.7, Mg 2.0, Alk Phos 87, ALT/SGPT 55<H>, AST/SGOT 36, HbA1c --    A1C with Estimated Average Glucose Result: 6.2 % (22 @ 14:17)  A1C with Estimated Average Glucose Result: 6.9 % (22 @ 15:40)  A1C with Estimated Average Glucose Result: 6.7 % (22 @ 15:05)    Finger Sticks: CAPILLARY BLOOD GLUCOSE  POCT Blood Glucose.: 137 mg/dL (15 Gus 2022 12:15)  POCT Blood Glucose.: 126 mg/dL (15 Gus 2022 08:41)  POCT Blood Glucose.: 172 mg/dL (2022 21:53)  POCT Blood Glucose.: 129 mg/dL (2022 17:27)    Skin per nursing documentation: no pressure injuries noted  Edema: none    Estimated Nutritional Needs  Based on dosing weight 162 Ibs/73.5 kg (05-26)  Energy Needs (25-30 kcals/kg): 3887-1566   Protein Needs (1.0-1.4 g/kg): 73.5-102.9     Previous Nutrition Diagnosis: Food & Nutrition Related Knowledge Deficit  Nutrition Diagnosis is: [x] ongoing  - addressed with ongoing diet education for LVAD/heart transplant evaluation    New Nutrition Diagnosis: [x] Not applicable    Nutrition Care Plan:  [x] In Progress  [] Achieved  [] Not applicable    Recommendations:      1. Continue current diet as ordered. Monitor and adjust PRN.   2. Dietary preferences obtained; RD to provide as able.  3. Diet education ongoing; RD remains available for additional information PRN.    Monitoring and Evaluation:   Continue to monitor nutritional intake, tolerance to diet prescription, weights, labs, skin integrity  RD remains available upon request and will follow up per protocol    Skylar Arriaga MS, RD, CDN, [facility] #[phone]

## 2022-06-16 DIAGNOSIS — E87.1 HYPO-OSMOLALITY AND HYPONATREMIA: ICD-10-CM

## 2022-06-16 LAB
ALBUMIN SERPL ELPH-MCNC: 3.2 G/DL — LOW (ref 3.3–5)
ALP SERPL-CCNC: 77 U/L — SIGNIFICANT CHANGE UP (ref 40–120)
ALT FLD-CCNC: 48 U/L — HIGH (ref 10–45)
ANION GAP SERPL CALC-SCNC: 11 MMOL/L — SIGNIFICANT CHANGE UP (ref 5–17)
APTT BLD: 45.1 SEC — HIGH (ref 27.5–35.5)
APTT BLD: 45.4 SEC — HIGH (ref 27.5–35.5)
AST SERPL-CCNC: 49 U/L — HIGH (ref 10–40)
BASOPHILS # BLD AUTO: 0.12 K/UL — SIGNIFICANT CHANGE UP (ref 0–0.2)
BASOPHILS NFR BLD AUTO: 1 % — SIGNIFICANT CHANGE UP (ref 0–2)
BILIRUB SERPL-MCNC: 0.6 MG/DL — SIGNIFICANT CHANGE UP (ref 0.2–1.2)
BUN SERPL-MCNC: 23 MG/DL — SIGNIFICANT CHANGE UP (ref 7–23)
CALCIUM SERPL-MCNC: 8.8 MG/DL — SIGNIFICANT CHANGE UP (ref 8.4–10.5)
CHLORIDE SERPL-SCNC: 93 MMOL/L — LOW (ref 96–108)
CO2 SERPL-SCNC: 20 MMOL/L — LOW (ref 22–31)
CREAT SERPL-MCNC: 1.21 MG/DL — SIGNIFICANT CHANGE UP (ref 0.5–1.3)
EGFR: 67 ML/MIN/1.73M2 — SIGNIFICANT CHANGE UP
EOSINOPHIL # BLD AUTO: 0.86 K/UL — HIGH (ref 0–0.5)
EOSINOPHIL NFR BLD AUTO: 6.9 % — HIGH (ref 0–6)
GLUCOSE BLDC GLUCOMTR-MCNC: 126 MG/DL — HIGH (ref 70–99)
GLUCOSE BLDC GLUCOMTR-MCNC: 129 MG/DL — HIGH (ref 70–99)
GLUCOSE BLDC GLUCOMTR-MCNC: 130 MG/DL — HIGH (ref 70–99)
GLUCOSE BLDC GLUCOMTR-MCNC: 137 MG/DL — HIGH (ref 70–99)
GLUCOSE SERPL-MCNC: 139 MG/DL — HIGH (ref 70–99)
HCT VFR BLD CALC: 35.5 % — LOW (ref 39–50)
HGB BLD-MCNC: 11.9 G/DL — LOW (ref 13–17)
IMM GRANULOCYTES NFR BLD AUTO: 0.9 % — SIGNIFICANT CHANGE UP (ref 0–1.5)
INR BLD: 1.21 RATIO — HIGH (ref 0.88–1.16)
LACTATE SERPL-SCNC: 1.6 MMOL/L — SIGNIFICANT CHANGE UP (ref 0.7–2)
LYMPHOCYTES # BLD AUTO: 19.2 % — SIGNIFICANT CHANGE UP (ref 13–44)
LYMPHOCYTES # BLD AUTO: 2.39 K/UL — SIGNIFICANT CHANGE UP (ref 1–3.3)
MAGNESIUM SERPL-MCNC: 2 MG/DL — SIGNIFICANT CHANGE UP (ref 1.6–2.6)
MCHC RBC-ENTMCNC: 29.9 PG — SIGNIFICANT CHANGE UP (ref 27–34)
MCHC RBC-ENTMCNC: 33.5 GM/DL — SIGNIFICANT CHANGE UP (ref 32–36)
MCV RBC AUTO: 89.2 FL — SIGNIFICANT CHANGE UP (ref 80–100)
MONOCYTES # BLD AUTO: 1.12 K/UL — HIGH (ref 0–0.9)
MONOCYTES NFR BLD AUTO: 9 % — SIGNIFICANT CHANGE UP (ref 2–14)
NEUTROPHILS # BLD AUTO: 7.86 K/UL — HIGH (ref 1.8–7.4)
NEUTROPHILS NFR BLD AUTO: 63 % — SIGNIFICANT CHANGE UP (ref 43–77)
NRBC # BLD: 0 /100 WBCS — SIGNIFICANT CHANGE UP (ref 0–0)
OSMOLALITY SERPL: 273 MOSMOL/KG — LOW (ref 280–301)
OSMOLALITY UR: 249 MOS/KG — LOW (ref 300–900)
PHOSPHATE SERPL-MCNC: 3.3 MG/DL — SIGNIFICANT CHANGE UP (ref 2.5–4.5)
PLATELET # BLD AUTO: 234 K/UL — SIGNIFICANT CHANGE UP (ref 150–400)
POTASSIUM SERPL-MCNC: 5.3 MMOL/L — SIGNIFICANT CHANGE UP (ref 3.5–5.3)
POTASSIUM SERPL-SCNC: 5.3 MMOL/L — SIGNIFICANT CHANGE UP (ref 3.5–5.3)
POTASSIUM UR-SCNC: 15 MMOL/L — SIGNIFICANT CHANGE UP
PROT SERPL-MCNC: 6.5 G/DL — SIGNIFICANT CHANGE UP (ref 6–8.3)
PROTHROM AB SERPL-ACNC: 14.1 SEC — HIGH (ref 10.5–13.4)
RBC # BLD: 3.98 M/UL — LOW (ref 4.2–5.8)
RBC # FLD: 15.4 % — HIGH (ref 10.3–14.5)
SODIUM SERPL-SCNC: 124 MMOL/L — LOW (ref 135–145)
SODIUM UR-SCNC: 33 MMOL/L — SIGNIFICANT CHANGE UP
URATE SERPL-MCNC: 4.3 MG/DL — SIGNIFICANT CHANGE UP (ref 3.4–8.8)
WBC # BLD: 12.46 K/UL — HIGH (ref 3.8–10.5)
WBC # FLD AUTO: 12.46 K/UL — HIGH (ref 3.8–10.5)

## 2022-06-16 PROCEDURE — 71045 X-RAY EXAM CHEST 1 VIEW: CPT | Mod: 26,77

## 2022-06-16 PROCEDURE — 36556 INSERT NON-TUNNEL CV CATH: CPT

## 2022-06-16 PROCEDURE — 99291 CRITICAL CARE FIRST HOUR: CPT

## 2022-06-16 PROCEDURE — 99292 CRITICAL CARE ADDL 30 MIN: CPT | Mod: 25

## 2022-06-16 PROCEDURE — 93010 ELECTROCARDIOGRAM REPORT: CPT

## 2022-06-16 PROCEDURE — 99292 CRITICAL CARE ADDL 30 MIN: CPT

## 2022-06-16 PROCEDURE — 76937 US GUIDE VASCULAR ACCESS: CPT | Mod: 26

## 2022-06-16 PROCEDURE — 71045 X-RAY EXAM CHEST 1 VIEW: CPT | Mod: 26

## 2022-06-16 RX ORDER — ACETAMINOPHEN 500 MG
1000 TABLET ORAL ONCE
Refills: 0 | Status: COMPLETED | OUTPATIENT
Start: 2022-06-16 | End: 2022-06-16

## 2022-06-16 RX ORDER — ACETAMINOPHEN 500 MG
1000 TABLET ORAL ONCE
Refills: 0 | Status: COMPLETED | OUTPATIENT
Start: 2022-06-16 | End: 2022-06-17

## 2022-06-16 RX ADMIN — CHLORHEXIDINE GLUCONATE 1 APPLICATION(S): 213 SOLUTION TOPICAL at 21:32

## 2022-06-16 RX ADMIN — Medication 81 MILLIGRAM(S): at 12:25

## 2022-06-16 RX ADMIN — Medication 1000 MILLIGRAM(S): at 04:56

## 2022-06-16 RX ADMIN — HEPARIN SODIUM 15.5 UNIT(S)/HR: 5000 INJECTION INTRAVENOUS; SUBCUTANEOUS at 06:21

## 2022-06-16 RX ADMIN — Medication 100 MILLIGRAM(S): at 12:25

## 2022-06-16 RX ADMIN — PANTOPRAZOLE SODIUM 40 MILLIGRAM(S): 20 TABLET, DELAYED RELEASE ORAL at 05:52

## 2022-06-16 RX ADMIN — CHLORHEXIDINE GLUCONATE 1 APPLICATION(S): 213 SOLUTION TOPICAL at 05:53

## 2022-06-16 RX ADMIN — Medication 100 MILLIGRAM(S): at 05:52

## 2022-06-16 RX ADMIN — Medication 100 MILLIGRAM(S): at 21:31

## 2022-06-16 RX ADMIN — ISOSORBIDE DINITRATE 40 MILLIGRAM(S): 5 TABLET ORAL at 21:32

## 2022-06-16 RX ADMIN — Medication 650 MILLIGRAM(S): at 03:48

## 2022-06-16 RX ADMIN — Medication 25 MILLIGRAM(S): at 05:52

## 2022-06-16 RX ADMIN — Medication 650 MILLIGRAM(S): at 01:03

## 2022-06-16 RX ADMIN — ISOSORBIDE DINITRATE 40 MILLIGRAM(S): 5 TABLET ORAL at 05:52

## 2022-06-16 RX ADMIN — AMIODARONE HYDROCHLORIDE 200 MILLIGRAM(S): 400 TABLET ORAL at 05:52

## 2022-06-16 RX ADMIN — Medication 100 MILLIGRAM(S): at 14:19

## 2022-06-16 RX ADMIN — ISOSORBIDE DINITRATE 40 MILLIGRAM(S): 5 TABLET ORAL at 14:19

## 2022-06-16 RX ADMIN — Medication 400 MILLIGRAM(S): at 04:22

## 2022-06-16 NOTE — PROGRESS NOTE ADULT - SUBJECTIVE AND OBJECTIVE BOX
Subjective:    Medications:  acetaminophen     Tablet .. 650 milliGRAM(s) Oral every 6 hours PRN  allopurinol 100 milliGRAM(s) Oral daily  aMIOdarone    Tablet 200 milliGRAM(s) Oral daily  aspirin enteric coated 81 milliGRAM(s) Oral daily  bisacodyl Suppository 10 milliGRAM(s) Rectal daily PRN  chlorhexidine 2% Cloths 1 Application(s) Topical daily  chlorhexidine 4% Liquid 1 Application(s) Topical <User Schedule>  heparin  Infusion 1200 Unit(s)/Hr IV Continuous <Continuous>  hydrALAZINE 100 milliGRAM(s) Oral every 8 hours  insulin lispro (ADMELOG) corrective regimen sliding scale   SubCutaneous at bedtime  insulin lispro (ADMELOG) corrective regimen sliding scale   SubCutaneous three times a day before meals  isosorbide   dinitrate Tablet (ISORDIL) 40 milliGRAM(s) Oral three times a day  lidocaine   4% Patch 1 Patch Transdermal daily  metoprolol succinate ER 25 milliGRAM(s) Oral daily  milrinone Infusion 0.5 MICROgram(s)/kG/Min IV Continuous <Continuous>  nitroprusside Infusion 0.25 MICROgram(s)/kG/Min IV Continuous <Continuous>  pantoprazole    Tablet 40 milliGRAM(s) Oral before breakfast  polyethylene glycol 3350 17 Gram(s) Oral daily  senna 1 Tablet(s) Oral at bedtime  simethicone 80 milliGRAM(s) Chew every 6 hours PRN  sodium chloride 0.65% Nasal 1 Spray(s) Both Nostrils every 6 hours PRN      Physical Exam:    Vitals:  Vital Signs Last 24 Hours  T(C): 36.3 (06-16-22 @ 08:00), Max: 37.2 (06-15-22 @ 16:00)  HR: 80 (06-16-22 @ 11:30) (78 - 116)  BP: --  RR: 22 (06-16-22 @ 11:30) (12 - 32)  SpO2: 93% (06-16-22 @ 11:30) (90% - 97%)        I&O's Summary    15 Gus 2022 07:01  -  16 Jun 2022 07:00  --------------------------------------------------------  IN: 2372.6 mL / OUT: 3250 mL / NET: -877.4 mL    16 Jun 2022 07:01  -  16 Jun 2022 12:28  --------------------------------------------------------  IN: 354.8 mL / OUT: 1575 mL / NET: -1220.2 mL        Tele:    General: No distress. Comfortable.  HEENT: EOM intact.  Neck: Neck supple. JVP not elevated. No masses  Chest: Clear to auscultation bilaterally  CV: Normal S1 and S2. No murmurs, rub, or gallops. Radial pulses normal.  Abdomen: Soft, non-distended, non-tender  Skin: No rashes or skin breakdown  Neurology: Alert and oriented times three. Sensation intact  Psych: Affect normal    Labs:                        11.9   12.46 )-----------( 234      ( 16 Jun 2022 04:20 )             35.5     06-16    124<L>  |  93<L>  |  23  ----------------------------<  139<H>  5.3   |  20<L>  |  1.21    Ca    8.8      16 Jun 2022 04:20  Phos  3.3     06-16  Mg     2.0     06-16    TPro  6.5  /  Alb  3.2<L>  /  TBili  0.6  /  DBili  x   /  AST  49<H>  /  ALT  48<H>  /  AlkPhos  77  06-16    PT/INR - ( 16 Jun 2022 05:42 )   PT: 14.1 sec;   INR: 1.21 ratio         PTT - ( 16 Jun 2022 05:42 )  PTT:45.4 sec            Lactate, Blood: 1.6 mmol/L (06-16 @ 04:20)  Lactate, Blood: 1.1 mmol/L (06-15 @ 04:39)  Lactate, Blood: 1.4 mmol/L (06-14 @ 05:17)     Subjective:  - overall feeling well. increased PO intake yesterday. L wrist pain improved. L knee/ankle pain improved.  - denies SOB, orthopnea, PND, CP, palpitations, lightheadedness, abdominal distention    Medications:  acetaminophen     Tablet .. 650 milliGRAM(s) Oral every 6 hours PRN  allopurinol 100 milliGRAM(s) Oral daily  aMIOdarone    Tablet 200 milliGRAM(s) Oral daily  aspirin enteric coated 81 milliGRAM(s) Oral daily  bisacodyl Suppository 10 milliGRAM(s) Rectal daily PRN  chlorhexidine 2% Cloths 1 Application(s) Topical daily  chlorhexidine 4% Liquid 1 Application(s) Topical <User Schedule>  heparin  Infusion 1200 Unit(s)/Hr IV Continuous <Continuous>  hydrALAZINE 100 milliGRAM(s) Oral every 8 hours  insulin lispro (ADMELOG) corrective regimen sliding scale   SubCutaneous at bedtime  insulin lispro (ADMELOG) corrective regimen sliding scale   SubCutaneous three times a day before meals  isosorbide   dinitrate Tablet (ISORDIL) 40 milliGRAM(s) Oral three times a day  lidocaine   4% Patch 1 Patch Transdermal daily  metoprolol succinate ER 25 milliGRAM(s) Oral daily  milrinone Infusion 0.5 MICROgram(s)/kG/Min IV Continuous <Continuous>  nitroprusside Infusion 0.25 MICROgram(s)/kG/Min IV Continuous <Continuous>  pantoprazole    Tablet 40 milliGRAM(s) Oral before breakfast  polyethylene glycol 3350 17 Gram(s) Oral daily  senna 1 Tablet(s) Oral at bedtime  simethicone 80 milliGRAM(s) Chew every 6 hours PRN  sodium chloride 0.65% Nasal 1 Spray(s) Both Nostrils every 6 hours PRN      Physical Exam:    Vitals:  Vital Signs Last 24 Hours  T(C): 36.3 (06-16-22 @ 08:00), Max: 37.2 (06-15-22 @ 16:00)  HR: 80 (06-16-22 @ 11:30) (78 - 116)  BP: IABP 1:1 assisted MAPs 78-80, aug diastolics   RR: 22 (06-16-22 @ 11:30) (12 - 32)  SpO2: 93% (06-16-22 @ 11:30) (90% - 97%)    I&O's Summary    15 Gus 2022 07:01  -  16 Jun 2022 07:00  --------------------------------------------------------  IN: 2372.6 mL / OUT: 3250 mL / NET: -877.4 mL    16 Jun 2022 07:01  -  16 Jun 2022 12:28  --------------------------------------------------------  IN: 354.8 mL / OUT: 1575 mL / NET: -1220.2 mL    Tele: vpaced    General: No distress. Comfortable.  HEENT: EOM intact.  Neck: Neck supple. JVP not elevated. No masses  Chest: Clear to auscultation bilaterally  CV: Normal S1 and S2. No murmurs, rub, or gallops. Radial pulses normal. No LE edema. + DP pulses  Abdomen: Soft, non-distended, non-tender  Skin: No rashes or skin breakdown. Warm peripherally  Neurology: Alert and oriented times three. Sensation intact  Psych: Affect normal    Labs:                        11.9   12.46 )-----------( 234      ( 16 Jun 2022 04:20 )             35.5     06-16    124<L>  |  93<L>  |  23  ----------------------------<  139<H>  5.3   |  20<L>  |  1.21    Ca    8.8      16 Jun 2022 04:20  Phos  3.3     06-16  Mg     2.0     06-16    TPro  6.5  /  Alb  3.2<L>  /  TBili  0.6  /  DBili  x   /  AST  49<H>  /  ALT  48<H>  /  AlkPhos  77  06-16    PT/INR - ( 16 Jun 2022 05:42 )   PT: 14.1 sec;   INR: 1.21 ratio      PTT - ( 16 Jun 2022 05:42 )  PTT:45.4 sec    Lactate, Blood: 1.6 mmol/L (06-16 @ 04:20)  Lactate, Blood: 1.1 mmol/L (06-15 @ 04:39)  Lactate, Blood: 1.4 mmol/L (06-14 @ 05:17)

## 2022-06-16 NOTE — PROGRESS NOTE ADULT - ASSESSMENT
65 YO M with a history of ACC/AHA Stage D mixed NICM/ICM (likely familial with strong FH and early arrhythmia history in his 30's) with LVED 5.2 cm and LVEF 10-15% s/p PPM upgraded to CRT-D, CAD s/p PCI to mLAD 4/2022, well controlled DM2 (A1c 6.2%), and CKD III (Cr 1.4) who initially presented to [facility] 5/20 with near syncope in setting of worsening HF symptoms and found to have 41 episodes of VT, many terminating with ATP. LHC did not reveal new obstructive CAD and he underwent EPS which did not reveal endocardial substrate amenable for ablation. RHC revealed severely depressed cardiac output and he was transferred to [facility] 5/26 for advanced therapies evaluation.    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 He improved significantly with sequential uptitration of inotropes and increased pacing rate, but on 6/6 he had worsening JAGRUTI. He is s/p RHC which revealed severely elevated filling pressures, severe cpcPH, and CI of 1.9 on milrinone 0.5. IABP was placed and his renal function/PAPs have improved. He is MCS dependent and was inadequately supported with high dose inotropes, so was listed UNOS status 2e for OHT, awaiting suitable donor. However, was made status 7 as he became febrile, last 6/7 T 38. He has been afebrile since and blood cultures from 6/7 with NGTD x 48 hours and his leukocytosis has not worsened. Discussed with transplant ID and since bld cx negative x48hrs he has been re-listed UNOS status 2e. He had a mild rise in his Cr yesterday that has now improved with gentle hydration. He does have a worsening hyponatremia, likely hypovolemic hyponatremia given recurrent net negative fluid balance with last measured CVP of 1.    Review of studies  TTE 5/21: LV 5.2 cm, LVEF 10-15%, LVOT VTI 10 cm, moderate RV dysfunction, mild AI, minimal MR  EKG: a-BiV paced  [facility] 5/23: patent mLAD stent with slow flow, D1 with 40-50% stenosis, mild disease otherwise  [facility] 5/26: RA 12, PA 70/40 (50), PCWP 22, Milana CO/CI 2.3/1.3, MAP 83 with SVR 2469, PVR 12 PERSAUD    Hemodynamics  5/27 (milrinone 0.25): RA 10, PA 76/35 (49), PCWP 34, PA sat 57% with Milana CO/CI 3.1/1.6, MAP 71 with SVR 1574, PVR 4.8  5/28 (on milrinone 0.375): RA 7, PA 63/31, PCWP 26, PA sat 72.8% with Milana CO/CI 4.9/2.5 (CI later dropped to 1.6 in the afternoon), MAP 70 with SVR 1039, PVR 2.9  5/29 (on milrinone 0.5): RA 8, PA 75/32 (50), PCWP 28, PA sat 74% with Milana CO/CI 5.0/2.6, MAP 77 with SVR 1200, PVR 4.4  5/29 (on milrinone 0.5 and nipride to 3 mcg/kg/min): RA 6, PA 59/31 (40), PCWP 30, PA sat 79% with Milana CO/CI 7.0/3.6, BP 97/57 (MAP 70) with , PVR 1.4  6/6 RHC (mil 0.5): RA 11 (v13), RV 75/17, PA 80/36/52, PCWP 24 (v29), PA sat 59.5%, CO/CI (F) 3.58/1.88  6/7 (mil 0.5, IABP 1:1): CVP 5, PA 66/32/45, PA sat 65.7%, CO/CI (F) 4.0/2.1, SVR 2000  6/8 (mil 0.5, IABP 1:1): CVP 1, PA 46/19/28, PA sat 72.7%, CO/CI (F) 5.6/2.9, SVR 1257  6/8 PM (mil 0.5, IABP 1:1): CVP 4, PA 68/25/38, PA sat 68%, CO/CI (f) 5/2.6, SVR 1326   67 YO M with a history of ACC/AHA Stage D mixed 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 5/20 with near syncope in setting of worsening HF symptoms and found to have 41 episodes of VT, many terminating with ATP. LHC did not reveal new obstructive CAD and he underwent EPS which did not reveal endocardial substrate amenable for ablation. RHC revealed severely depressed cardiac output and he was transferred to [facility] 5/26 for advanced therapies evaluation.    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 He improved significantly with sequential uptitration of inotropes and increased pacing rate, but on 6/6 he had worsening JAGRUTI. He is s/p RHC which revealed severely elevated filling pressures, severe cpcPH, and CI of 1.9 on milrinone 0.5. IABP was placed and his renal function/PAPs have improved. He is MCS dependent and was inadequately supported with high dose inotropes, so was listed UNOS status 2e for OHT, awaiting suitable donor. However, was made status 7 as he became febrile, last 6/7 T 38. He has been afebrile since and blood cultures from 6/7 with NGTD x 48 hours and his leukocytosis has not worsened. Discussed with transplant ID and since bld cx negative x48hrs he has been re-listed UNOS status 2e. He had a mild rise in his Cr yesterday that has now improved with gentle hydration. He does have a worsening hyponatremia, likely hypovolemic hyponatremia given recurrent net negative fluid balance with last measured CVP of 1. Overall currently stable awaiting suitable donor.     Review of studies  TTE 5/21: LV 5.2 cm, LVEF 10-15%, LVOT VTI 10 cm, moderate RV dysfunction, mild AI, minimal MR  EKG: a-BiV paced  [facility] 5/23: patent mLAD stent with slow flow, D1 with 40-50% stenosis, mild disease otherwise  RHC 5/26: RA 12, PA 70/40 (50), PCWP 22, Milana CO/CI 2.3/1.3, MAP 83 with SVR 2469, PVR 12 PERSAUD    Hemodynamics  5/27 (milrinone 0.25): RA 10, PA 76/35 (49), PCWP 34, PA sat 57% with Milana CO/CI 3.1/1.6, MAP 71 with SVR 1574, PVR 4.8  5/28 (on milrinone 0.375): RA 7, PA 63/31, PCWP 26, PA sat 72.8% with Milana CO/CI 4.9/2.5 (CI later dropped to 1.6 in the afternoon), MAP 70 with SVR 1039, PVR 2.9  5/29 (on milrinone 0.5): RA 8, PA 75/32 (50), PCWP 28, PA sat 74% with Milana CO/CI 5.0/2.6, MAP 77 with SVR 1200, PVR 4.4  5/29 (on milrinone 0.5 and nipride to 3 mcg/kg/min): RA 6, PA 59/31 (40), PCWP 30, PA sat 79% with Milana CO/CI 7.0/3.6, BP 97/57 (MAP 70) with , PVR 1.4  6/6 RHC (mil 0.5): RA 11 (v13), RV 75/17, PA 80/36/52, PCWP 24 (v29), PA sat 59.5%, CO/CI (F) 3.58/1.88  6/7 (mil 0.5, IABP 1:1): CVP 5, PA 66/32/45, PA sat 65.7%, CO/CI (F) 4.0/2.1, SVR 2000  6/8 (mil 0.5, IABP 1:1): CVP 1, PA 46/19/28, PA sat 72.7%, CO/CI (F) 5.6/2.9, SVR 1257  6/8 PM (mil 0.5, IABP 1:1): CVP 4, PA 68/25/38, PA sat 68%, CO/CI (f) 5/2.6, SVR 1326

## 2022-06-16 NOTE — PROGRESS NOTE ADULT - PROBLEM SELECTOR PLAN 3
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 Nipride study 5/29 with reduction in PVR to < 2 though still with elevated PA pressures   - Improved with MCS unloading  - Given that he has previously had elevated PA pressures which where reversible with Nipride, would favor re-insertion of PA catheter to re-assess PA pressures prior to transplant.

## 2022-06-16 NOTE — PROGRESS NOTE ADULT - ASSESSMENT
====================ASSESSMENT ==============  65 y/o male w/ PMHx HTN, HLD, type 2 DM, chronic HFrEF (EF 25-30% by Echo), complete heart block s/p PPM (in 2006, upgraded to BiV-ICD in 09/2016), CAD (s/p recent BERNABE mid LAD at [facility] on 04/18/2022), and stage II CKD (baseline Cr ~1.3) presented with near syncope to OSH found to have 41 episodes of VT on device, s/p unsuccessful VT ablation and transferred to [facility] for management of cardiogenic shock and advanced therapy eval.     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   - monitor hemodynamics and perfusion indices  - HF following, listed for OHT Status 2E  - C/w isodril      VTach   - s/p EPS on 5/24, unable to perform ablation as no substrate found and did not induce VT because of severely low EF   - Interrogation of ICD @Dr Wilson's office 5/20: back-to-back episodes of VT @ 200+ bpm all terminating with ATP. Since last cath pt has had 41 VT episodes (all falling into VF zone)  - Normal device function. BIV pacing 97%. No programming changes made  - c/w Amiodarone 200 QD  - c/w Toprol 25 daily  - No further VT on inotropic support  - keep K 4-4.5  - EP consult 6/15 for atrial tachycardia, prior VT as assessment of amiodarone; device interrogated, no recorded AT/AF or VT/VF. Per EP, no new interventions for slow NSVT. c/w amiodarone and toprol.       CAD   - Chest pain free and compliant with DAPT since last PCI 4/18/22 per discussion with interventional at OSH, ok to DC plavix pending cardiac surgery, last dose 5/28  - Cardiac cath @[facility] 4/18/22: mLAD 90% s/p BERNABE, LCx mild disease, RCA mild disease s/p diagnostic cardiac cath w/ Dr Wagner on 05/23/2022   - C/w ASA, d/c lipitor for elevated LFTs, pending transplant      ===================HEMATOLOGIC/ONC ===================  ?Secondary Polycythemia Vera   - elevated EPO, hgb in normal range  - Heme following peripherally  - f/u MELINA-2     AC therapy   - Heparin gtt for IABP    ===================== RENAL =========================  JAGRUTI on CKD II  Admitted w/ Cr 2.01 (baseline Cr ~1.3), initial JAGRUTI 2/2 cardiogenic shock given RHC findings of CI 1.88. Improving  - 6/15 uptrending, likely 2/2 hypovolemia, improved s/p 250cc albumin  - Avoid nephrotoxic agents, NSAIDs. Renally dose meds.  - monitor strict I/Os and continue current cardiac support  - Continue monitoring urine output    #Hyponatremia  -Na downtrending, 124 today.   -unlikely iso hypervolemia, either hypovolemic or euvolemic hyponatremia. f/u urine sodium, osm, serum osm    ==================== GASTROINTESTINAL===================  Elevated Liver Enzymes:  - elevated AST/ALT, normal bilirubin, alk phos. statin d/c 6/13. Downtrending, low concern for amio toxicity.   - Abd US on 6/14: Liver wnl, contracted gallbladder in the setting of recent meal. No acute cholecystitis.    Diet:   - Tolerating PO diet, Constant carb    Constipation:  - Cont. bowel regimen Miralax, Senna, PRN simethicone and bisacodyl   - s/p colonoscopy 6/3 with 5 polyps removed and biopsied, benign findings- GI recs for 3 yr follow up colo  - Protonix for GI prophylaxis      =======================    ENDOCRINE  =====================  T2DM (A1C 6.2 on admission)  - Cont. ISS, glucose controlled, monitor FS closely     Gout flare, Left knee (previously R)  - continue allopurinol, s/p prednisone  -6/15: with right wrist pain, left knee and left ankle pain possible 2/2 gout flare, f/u xray wrist         ========================INFECTIOUS DISEASE================  RUE Thrombophlebitis  - RUE US Duplex showing superficial thrombus  - s/p course of ancef    Febrile 6/7-8 overnight x1   - Temp 99.7 F  - UA neg, BCx on 6/7 NGTD x2, RVP neg   - follow fever curve, WBC    ====================ASSESSMENT ==============  66 year old man w/ PMHx HTN, HLD, type 2 DM, chronic HFrEF (EF 25-30% by Echo), complete heart block s/p PPM (in 2006, upgraded to BiV-ICD in 09/2016), CAD (s/p recent BERNABE mid LAD at [facility] on 04/18/2022), and stage II CKD (baseline Cr ~1.3) presented with near syncope to OSH found to have 41 episodes of VT on device, s/p unsuccessful VT ablation and transferred to [facility] for management of cardiogenic shock and advanced therapy eval.     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   - monitor hemodynamics and perfusion indices  - HF following, listed for OHT Status 2E  - C/w isodril      VTach   - s/p EPS on 5/24, unable to perform ablation as no substrate found and did not induce VT because of severely low EF   - Interrogation of ICD @Dr Wilson's office 5/20: back-to-back episodes of VT @ 200+ bpm all terminating with ATP. Since last cath pt has had 41 VT episodes (all falling into VF zone)  - Normal device function. BIV pacing 97%. No programming changes made  - c/w Amiodarone 200 QD  - c/w Toprol 25 daily  - No further VT on inotropic support  - keep K 4-4.5  - EP consult 6/15 for atrial tachycardia, prior VT as assessment of amiodarone; device interrogated, no recorded AT/AF or VT/VF. Per EP, no new interventions for slow NSVT. c/w amiodarone and toprol.       CAD   - Chest pain free and compliant with DAPT since last PCI 4/18/22 per discussion with interventional at OSH, ok to DC plavix pending cardiac surgery, last dose 5/28  - Cardiac cath @[facility] 4/18/22: mLAD 90% s/p BERNABE, LCx mild disease, RCA mild disease s/p diagnostic cardiac cath w/ Dr Wagner on 05/23/2022   - C/w ASA, d/c lipitor for elevated LFTs, pending transplant      ===================HEMATOLOGIC/ONC ===================  ?Secondary Polycythemia Vera   - elevated EPO, hgb in normal range  - Heme following peripherally  - f/u MELINA-2     AC therapy   - Heparin gtt for IABP    ===================== RENAL =========================  JAGRUTI on CKD II  Admitted w/ Cr 2.01 (baseline Cr ~1.3), initial JAGRUTI 2/2 cardiogenic shock given RHC findings of CI 1.88. Improving  - 6/15 uptrending, likely 2/2 hypovolemia, improved s/p 250cc albumin  - Avoid nephrotoxic agents, NSAIDs. Renally dose meds.  - monitor strict I/Os and continue current cardiac support  - Continue monitoring urine output    #Hyponatremia  -Na downtrending, 124 today.   -unlikely iso hypervolemia, CVP today 4.  Either hypovolemic or euvolemic hyponatremia. f/u urine sodium, osm, serum osm.     ==================== GASTROINTESTINAL===================  Elevated Liver Enzymes:  - elevated AST/ALT, normal bilirubin, alk phos. statin d/c 6/13. Downtrending, low concern for amio toxicity.   - Abd US on 6/14: Liver wnl, contracted gallbladder in the setting of recent meal. No acute cholecystitis.    Diet:   - Tolerating PO diet, Constant carb    Constipation:  - Cont. bowel regimen Miralax, Senna, PRN simethicone and bisacodyl   - s/p colonoscopy 6/3 with 5 polyps removed and biopsied, benign findings- GI recs for 3 yr follow up colo  - Protonix for GI prophylaxis      =======================    ENDOCRINE  =====================  T2DM (A1C 6.2 on admission)  - Cont. ISS, glucose controlled, monitor FS closely     Gout flare, Left knee (previously R)  - continue allopurinol, s/p prednisone  - 6/15: with right wrist pain, left knee and left ankle pain possible 2/2 gout flare,  xray wrist with findings c/w OA        ========================INFECTIOUS DISEASE================  RUE Thrombophlebitis  - RUE US Duplex showing superficial thrombus  - s/p course of ancef    Febrile 6/7-8 overnight x1   - Temp 99.7 F  - UA neg, BCx on 6/7 NGTD x2, RVP neg   - follow fever curve, WBC    ====================ASSESSMENT ==============  66 year old man w/ PMHx HTN, HLD, type 2 DM, chronic HFrEF (EF 25-30% by Echo), complete heart block s/p PPM (in 2006, upgraded to BiV-ICD in 09/2016), CAD (s/p recent BERNABE mid LAD at [facility] on 04/18/2022), and stage II CKD (baseline Cr ~1.3) presented with near syncope to OSH found to have 41 episodes of VT on device, s/p unsuccessful VT ablation and transferred to [facility] for management of cardiogenic shock and advanced therapy eval.     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   - monitor hemodynamics and perfusion indices  - HF following, listed for OHT Status 2E  - C/w isodril      VTach   - s/p EPS on 5/24, unable to perform ablation as no substrate found and did not induce VT because of severely low EF   - Interrogation of ICD @Dr Wilson's office 5/20: back-to-back episodes of VT @ 200+ bpm all terminating with ATP. Since last cath pt has had 41 VT episodes (all falling into VF zone)  - Normal device function. BIV pacing 97%. No programming changes made  - c/w Amiodarone 200 QD  - c/w Toprol 25 daily  - No further VT on inotropic support  - keep K 4-4.5  - EP consult 6/15 for atrial tachycardia, prior VT as assessment of amiodarone; device interrogated, no recorded AT/AF or VT/VF. Per EP, no new interventions for slow NSVT. c/w amiodarone and toprol.       CAD   - Chest pain free and compliant with DAPT since last PCI 4/18/22 per discussion with interventional at OSH, ok to DC plavix pending cardiac surgery, last dose 5/28  - Cardiac cath @[facility] 4/18/22: mLAD 90% s/p BERNABE, LCx mild disease, RCA mild disease s/p diagnostic cardiac cath w/ Dr Wagner on 05/23/2022   - C/w ASA, d/c lipitor for elevated LFTs, pending transplant      ===================HEMATOLOGIC/ONC ===================  ?Secondary Polycythemia Vera   - elevated EPO, hgb in normal range  - Heme following peripherally  - f/u MELINA-2     AC therapy   - Heparin gtt for IABP    ===================== RENAL =========================  JAGRUTI on CKD II  Admitted w/ Cr 2.01 (baseline Cr ~1.3), initial JAGRUTI 2/2 cardiogenic shock given RHC findings of CI 1.88. Improving  - 6/15 uptrending, likely 2/2 hypovolemia, improved s/p 250cc albumin  - Avoid nephrotoxic agents, NSAIDs. Renally dose meds.  - monitor strict I/Os and continue current cardiac support  - Continue monitoring urine output    #Hyponatremia  -Na downtrending, 124 today.   -unlikely iso hypervolemia, CVP today 4.  Either hypovolemic or euvolemic hyponatremia. f/u urine sodium, osm, serum osm.     ==================== GASTROINTESTINAL===================  Elevated Liver Enzymes:  - elevated AST/ALT, normal bilirubin, alk phos. statin d/c 6/13. Downtrending  - Abd US on 6/14: Liver wnl, contracted gallbladder in the setting of recent meal. No acute cholecystitis.    Diet:   - Tolerating PO diet, Constant carb    Constipation:  - Cont. bowel regimen Miralax, Senna, PRN simethicone and bisacodyl   - s/p colonoscopy 6/3 with 5 polyps removed and biopsied, benign findings- GI recs for 3 yr follow up colo  - Protonix for GI prophylaxis      =======================    ENDOCRINE  =====================  T2DM (A1C 6.2 on admission)  - Cont. ISS, glucose controlled, monitor FS closely     Gout flare, Left knee (previously R)  - continue allopurinol, s/p prednisone  - 6/15: with right wrist pain, left knee and left ankle pain possible 2/2 gout flare,  xray wrist with findings c/w OA        ========================INFECTIOUS DISEASE================  RUE Thrombophlebitis  - RUE US Duplex showing superficial thrombus  - s/p course of ancef    Febrile 6/7-8 overnight x1   - Temp 99.7 F  - UA neg, BCx on 6/7 NGTD x2, RVP neg   - follow fever curve, WBC

## 2022-06-16 NOTE — PROGRESS NOTE ADULT - PROBLEM SELECTOR PLAN 7
- 100.6 fever on 5/28, cultures NGTD  - Tmax 100.4 on 6/7   - appreciate transplant ID recs, fever  possibly due to RUE occlusive thrombus seen in right cephalic and basilic veins as seen on [facility] duplex performed on 6/3, possible thrombophlebitis, was given Ancef 6/2-6/7   - leukocytosis remains elevated, however relatively stable  - RUE doppler 6/3 with no evidence of DVT, but did have an occlusive thrombus within the superficial veins (cephalic and basilic)  - BCx NGTD (6/7), RVP negative, UA negative  - continue to monitor

## 2022-06-16 NOTE — PROGRESS NOTE ADULT - PROBLEM SELECTOR PLAN 6
6/8 SCr 1.12, 6/9 1.29   - upon arrival was noted to have JAGRUTI, likely related to low cardiac output  - mild rise in Cr yesterday in setting of hypovolemia, now normalized with albumin/increase PO fluid intake

## 2022-06-16 NOTE — PROGRESS NOTE ADULT - PROBLEM SELECTOR PLAN 1
- continue IABP 1:1  - continue milrinone 0.5 mcg/kg/min  - continue HDZN 100 mg TID and ISDN 40 mg TID for afterload reduction  - nipride titrate for assisted means 70-80 on IABP  -swan pulled on 6/8/22 in setting of fever  - check CVP today with plan for swan if CVP > 10 - continue IABP 1:1  - continue milrinone 0.5 mcg/kg/min  - continue HDZN 100 mg TID and ISDN 40 mg TID for afterload reduction  - nipride titrate for assisted means 70-80 on IABP  - swan removed 6/8 in setting of fever  - check CVP today with plan for swan if CVP > 10

## 2022-06-16 NOTE — PROGRESS NOTE ADULT - CRITICAL CARE ATTENDING COMMENT
stable overnight.   further albumin received   episode slow VT yesterday. ICD interogated, no sustained VT  meds: milrinone .5, nipride 3, heparin (45), amnio 200, HDZN 100 tid, ISDN 40 tid, toprol 25 asa, ppi   HR  BIV (no recent VT), aug 78-80, aug diast , afebrile.   I/O: -880  06-16  124<L>  |  93<L>  |  23  ----------------------------<  139<H>  5.3  H |  20<L>  |  1.21  Ca    8.8      16 Jun 2022 04:20  Phos  3.3     06-16  Mg     2.0     06-16  TPro  6.5  /  Alb  3.2<L>  /  TBili  0.6  /  DBili  x   /  AST  49<H>  /  ALT  48<H>  /  AlkPhos  77  06-16                        11.9   12.46 )-----------( 234      ( 16 Jun 2022 04:20 )             35.5   WBC stable  Cr improved.   however, Na falling. have asked team to plave central line for CVP monitoring., stable overnight.   further albumin received   episode slow VT yesterday. ICD interogated, no sustained VT  meds: milrinone .5, nipride 3, heparin (45), amnio 200, HDZN 100 tid, ISDN 40 tid, toprol 25 asa, ppi   HR  BIV (no recent VT), aug 78-80, aug diast , afebrile.   I/O: -880  06-16  124<L>  |  93<L>  |  23  ----------------------------<  139<H>  5.3  H |  20<L>  |  1.21  Ca    8.8      16 Jun 2022 04:20  Phos  3.3     06-16  Mg     2.0     06-16  TPro  6.5  /  Alb  3.2<L>  /  TBili  0.6  /  DBili  x   /  AST  49<H>  /  ALT  48<H>  /  AlkPhos  77  06-16                        11.9   12.46 )-----------( 234      ( 16 Jun 2022 04:20 )             35.5   WBC stable  Cr improved.   however, Na falling. have asked team to place central line for CVP monitoring and send urine serum lytes  remains UNOS IIe (BP) PRA 0, ABO MARI Dominique

## 2022-06-16 NOTE — PROGRESS NOTE ADULT - SUBJECTIVE AND OBJECTIVE BOX
GOCOOL, LENNOX  [id number]  Patient is a 66y old  Male who presents with a chief complaint of LVAD/OHT eval (16 Jun 2022 12:27)    HPI:  66M Mixed Ischemic/NICM Cardiomyopathy (EF 25-30% at baseline, s/p BIV-ICD), CAD (medically managed MIs in 2008,2011, Most recent stent in April 2022 to mLAD) presented with multiple near syncope, found to have 41 episodes of VT and admitted initially to cardiac telemetry for further evaluation/management. Ischemic evaluation without new disease and VT ablation without substrate to complete ablation.   Transferred from [facility] for further management and evaluation for LVAD vs OHT.    (26 May 2022 20:31)      Hospital Course:  5/26/22 Transferred to CCU for further management     24 HOUR EVENTS:    REVIEW OF SYSTEMS:    CONSTITUTIONAL: No weakness, fevers or chills  EYES/ENT: No visual changes;  No vertigo or throat pain   NECK: No pain or stiffness  RESPIRATORY: No cough, wheezing, hemoptysis; No shortness of breath  CARDIOVASCULAR: No chest pain or palpitations  GASTROINTESTINAL: No abdominal or epigastric pain. No nausea, vomiting, or hematemesis; No diarrhea or constipation. No melena or hematochezia.  GENITOURINARY: No dysuria, frequency or hematuria  NEUROLOGICAL: No numbness or weakness  SKIN: No itching, rashes    ICU Vital Signs Last 24 Hrs  T(C): 37.4 (16 Jun 2022 17:00), Max: 37.4 (16 Jun 2022 17:00)  T(F): 99.4 (16 Jun 2022 17:00), Max: 99.4 (16 Jun 2022 17:00)  HR: 80 (16 Jun 2022 21:30) (80 - 112)  BP: --  BP(mean): --  ABP: --  ABP(mean): --  RR: 23 (16 Jun 2022 21:30) (12 - 29)  SpO2: 93% (16 Jun 2022 21:30) (90% - 97%)    CVP(mm Hg): 4 (06-16-22 @ 13:30) (4 - 4)  CO: --  CI: --  PA: --  PA(mean): --  PA(direct): --  PCWP: --  LA: --  RA: --  SVR: --  SVRI: --  PVR: --  PVRI: --  I&O's Summary    15 Gus 2022 07:01  -  16 Jun 2022 07:00  --------------------------------------------------------  IN: 2372.6 mL / OUT: 3250 mL / NET: -877.4 mL    16 Jun 2022 07:01  -  16 Jun 2022 21:49  --------------------------------------------------------  IN: 1332.5 mL / OUT: 3225 mL / NET: -1892.5 mL      CAPILLARY BLOOD GLUCOSE    CAPILLARY BLOOD GLUCOSE    POCT Blood Glucose.: 126 mg/dL (16 Jun 2022 21:31)    PHYSICAL EXAMINATION:  Appearance: Normal, NAD  HEAD:  Normocephalic  EYES:  EOMI, conjunctiva and sclera clear  NECK: Supple, No JVD  CHEST/LUNG: Clear to auscultation bilaterally; No wheezing  HEART: Normal S1, S2. No rubs, or gallops  ABDOMEN: + Bowel sounds, Soft, NT, ND   EXTREMITIES:  2+ Peripheral Pulses, No clubbing, cyanosis, or edema. b/l Knees equal in temperature, no effusions noted  NEUROLOGY: non-focal, aaox3  SKIN: No rashes or lesions    ============================I/O===========================   I&O's Detail    15 Gus 2022 07:01  -  16 Jun 2022 07:00  --------------------------------------------------------  IN:    Heparin: 360 mL    IV PiggyBack: 250 mL    Milrinone: 261.6 mL    Nitroprusside: 781 mL    Oral Fluid: 720 mL  Total IN: 2372.6 mL    OUT:    Voided (mL): 3250 mL  Total OUT: 3250 mL    Total NET: -877.4 mL    16 Jun 2022 07:01  -  16 Jun 2022 21:49  --------------------------------------------------------  IN:    Heparin: 197 mL    Milrinone: 163.5 mL    Nitroprusside: 492 mL    Oral Fluid: 480 mL  Total IN: 1332.5 mL    OUT:    Voided (mL): 3225 mL  Total OUT: 3225 mL    Total NET: -1892.5 mL    ============================ LABS =========================                        11.9   12.46 )-----------( 234      ( 16 Jun 2022 04:20 )             35.5     06-16    124<L>  |  93<L>  |  23  ----------------------------<  139<H>  5.3   |  20<L>  |  1.21    Ca    8.8      16 Jun 2022 04:20  Phos  3.3     06-16  Mg     2.0     06-16    TPro  6.5  /  Alb  3.2<L>  /  TBili  0.6  /  DBili  x   /  AST  49<H>  /  ALT  48<H>  /  AlkPhos  77  06-16      LIVER FUNCTIONS - ( 16 Jun 2022 04:20 )  Alb: 3.2 g/dL / Pro: 6.5 g/dL / ALK PHOS: 77 U/L / ALT: 48 U/L / AST: 49 U/L / GGT: x           PT/INR - ( 16 Jun 2022 05:42 )   PT: 14.1 sec;   INR: 1.21 ratio        PTT - ( 16 Jun 2022 20:52 )  PTT:45.1 sec    Lactate, Blood: 1.6 mmol/L (06-16-22 @ 04:20)  Lactate, Blood: 1.1 mmol/L (06-15-22 @ 04:39)  Lactate, Blood: 1.4 mmol/L (06-14-22 @ 05:17)    =====================Micro/Rad/Cardio=================  Telemtry: Reviewed   EKG: Reviewed  CXR: Reviewed  Culture: Reviewed   Echo:   Cath:   ======================================================  PAST MEDICAL & SURGICAL HISTORY:  AV block    Essential hypertension    Chronic HFrEF (heart failure with reduced ejection fraction)    Chronic kidney disease, unspecified CKD stage    CAD (coronary artery disease)    HLD (hyperlipidemia)    Type 2 diabetes mellitus    Artificial cardiac pacemaker  ====================ASSESSMENT ==============  66 year old man w/ PMHx HTN, HLD, type 2 DM, chronic HFrEF (EF 25-30% by Echo), complete heart block s/p PPM (in 2006, upgraded to BiV-ICD in 09/2016), CAD (s/p recent BERNABE mid LAD at [facility] on 04/18/2022), and stage II CKD (baseline Cr ~1.3) presented with near syncope to OSH found to have 41 episodes of VT on device, s/p unsuccessful VT ablation and transferred to [facility] for management of cardiogenic shock and advanced therapy eval.     Plan:  ====================== NEUROLOGY=====================   AAOx3  - No active issues  - Tylenol PRN for fevers/pain    acetaminophen     Tablet .. 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   - monitor hemodynamics and perfusion indices  - HF following, listed for OHT Status 2E  - C/w isodril      VTach   - s/p EPS on 5/24, unable to perform ablation as no substrate found and did not induce VT because of severely low EF   - Interrogation of ICD @Dr Wilson's office 5/20: back-to-back episodes of VT @ 200+ bpm all terminating with ATP. Since last cath pt has had 41 VT episodes (all falling into VF zone)  - Normal device function. BIV pacing 97%. No programming changes made  - c/w Amiodarone 200 QD  - c/w Toprol 25 daily  - No further VT on inotropic support  - keep K 4-4.5  - EP consult 6/15 for atrial tachycardia, prior VT as assessment of amiodarone; device interrogated, no recorded AT/AF or VT/VF. Per EP, no new interventions for slow NSVT.   -c/w amiodarone and toprol.       CAD   - Chest pain free and compliant with DAPT since last PCI 4/18/22 per discussion with interventional at OSH, ok to DC plavix pending cardiac surgery, last dose 5/28  - Cardiac cath @[facility] 4/18/22: mLAD 90% s/p BERNABE, LCx mild disease, RCA mild disease s/p diagnostic cardiac cath w/ Dr Wagner on 05/23/2022   - C/w ASA, d/c'ed lipitor for elevated LFTs, pending transplant    aspirin enteric coated 81 milliGRAM(s) Oral daily  aMIOdarone    Tablet 200 milliGRAM(s) Oral daily  hydrALAZINE 100 milliGRAM(s) Oral every 8 hours  isosorbide   dinitrate Tablet (ISORDIL) 40 milliGRAM(s) Oral three times a day  metoprolol succinate ER 25 milliGRAM(s) Oral daily  milrinone Infusion 0.5 MICROgram(s)/kG/Min (11 mL/Hr) IV Continuous <Continuous>  nitroprusside Infusion 0.25 MICROgram(s)/kG/Min (2.73 mL/Hr) IV Continuous <Continuous>    ===================HEMATOLOGIC/ONC ===================  Secondary Polycythemia Vera   - elevated EPO, hgb in normal range  - Heme following peripherally  - f/u MELINA-2     AC therapy   - Heparin gtt for IABP    heparin  Infusion 1200 Unit(s)/Hr (16 mL/Hr) IV Continuous <Continuous>    ===================== RENAL =========================  JAGRUTI on CKD II  Admitted w/ Cr 2.01 (baseline Cr ~1.3), initial JAGRUTI 2/2 cardiogenic shock given RHC findings of CI 1.88. Improving  - 6/15 uptrending, likely 2/2 hypovolemia, improved s/p 250cc albumin  - Avoid nephrotoxic agents, NSAIDs. Renally dose meds.  - monitor strict I/Os and continue current cardiac support  - Continue monitoring urine output    Hyponatremia  -Na downtrending, 124 today.   -unlikely iso hypervolemia, CVP today 4.  Either hypovolemic or euvolemic hyponatremia. f/u urine sodium, osm, serum osm.       ==================== GASTROINTESTINAL===================  Elevated Liver Enzymes:  - elevated AST/ALT, normal bilirubin, alk phos. statin d/c 6/13. Downtrending  - Abd US on 6/14: Liver wnl, contracted gallbladder in the setting of recent meal. No acute cholecystitis.    Diet:   - Tolerating PO diet, Constant carb    Constipation:  - Cont. bowel regimen Miralax, Senna, PRN simethicone and bisacodyl   - s/p colonoscopy 6/3 with 5 polyps removed and biopsied, benign findings- GI recs for 3 yr follow up colo  - Protonix for GI prophylaxis    bisacodyl Suppository 10 milliGRAM(s) Rectal daily PRN Constipation  pantoprazole    Tablet 40 milliGRAM(s) Oral before breakfast  polyethylene glycol 3350 17 Gram(s) Oral daily  senna 1 Tablet(s) Oral at bedtime  simethicone 80 milliGRAM(s) Chew every 6 hours PRN Gas    =======================    ENDOCRINE  =====================  T2DM (A1C 6.2 on admission)  - Cont. ISS, glucose controlled, monitor FS closely     Gout flare, Left knee (previously R)  - continue allopurinol, s/p prednisone  - 6/15: with right wrist pain, left knee and left ankle pain possible 2/2 gout flare,  xray wrist with findings c/w OA    allopurinol 100 milliGRAM(s) Oral daily  insulin lispro (ADMELOG) corrective regimen sliding scale   SubCutaneous at bedtime  insulin lispro (ADMELOG) corrective regimen sliding scale   SubCutaneous three times a day before meals    ========================INFECTIOUS DISEASE================  RUE Thrombophlebitis  - RUE US Duplex showing superficial thrombus  - s/p course of ancef    Febrile 6/7-8 overnight x1   - Temp 99.4 F  - UA neg, BCx on 6/7 NGTD x2, RVP neg   - follow fever curve, WBC       Patient requires continuous monitoring with bedside rhythm monitoring, pulse ox monitoring, and intermittent blood gas analysis. Care plan discussed with ICU care team. Patient remained critical and at risk for life threatening decompensation.  Patient seen, examined and plan discussed with CCU team during rounds.     I have personally provided ____ minutes of critical care time excluding time spent on separate procedures, in addition to initial critical care time provided by the CICU Attending, Dr. Blandon.     By signing my name below, I, Camila Martinez, attest that this documentation has been prepared under the direction and in the presence of Declan Crump NP   Electronically signed: Mini Lassiter, 06-16-22 @ 21:49    I, Declan Crump NP, personally performed the services described in this documentation. all medical record entries made by the nelidaibshira were at my direction and in my presence. I have reviewed the chart and agree that the record reflects my personal performance and is accurate and complete  Electronically signed: Declan Crump NP        GOCOOL, LENNOX  MRN-[id number]  Patient is a 66y old  Male who presents with a chief complaint of LVAD/OHT eval (16 Jun 2022 12:27)    HPI: 66M Mixed Ischemic/NICM Cardiomyopathy (EF 25-30% at baseline, s/p BIV-ICD), CAD (medically managed MIs in 2008,2011, Most recent stent in April 2022 to mLAD) presented with multiple near syncope, found to have 41 episodes of VT and admitted initially to cardiac telemetry for further evaluation/management. Ischemic evaluation without new disease and VT ablation without substrate to complete ablation. Transferred from [facility] for further management and evaluation for LVAD vs OHT.  (26 May 2022 20:31)    Hospital Course:  5/26/22 Transferred to CCU for further management     REVIEW OF SYSTEMS:  CONSTITUTIONAL: No weakness, fevers or chills  EYES/ENT: No visual changes;  No vertigo or throat pain   NECK: No pain or stiffness  RESPIRATORY: No cough, wheezing, hemoptysis; No shortness of breath  CARDIOVASCULAR: No chest pain or palpitations  GASTROINTESTINAL: No abdominal or epigastric pain. No nausea, vomiting, or hematemesis; No diarrhea or constipation. No melena or hematochezia.  GENITOURINARY: No dysuria, frequency or hematuria  NEUROLOGICAL: No numbness or weakness  SKIN: No itching, rashes    ICU Vital Signs Last 24 Hrs  T(C): 37.4 (16 Jun 2022 17:00), Max: 37.4 (16 Jun 2022 17:00)  T(F): 99.4 (16 Jun 2022 17:00), Max: 99.4 (16 Jun 2022 17:00)  HR: 80 (16 Jun 2022 21:30) (80 - 112)  RR: 23 (16 Jun 2022 21:30) (12 - 29)  SpO2: 93% (16 Jun 2022 21:30) (90% - 97%)    CVP(mm Hg): 4 (06-16-22 @ 13:30) (4 - 4)    I&O's Summary    15 Gus 2022 07:01  -  16 Jun 2022 07:00  --------------------------------------------------------  IN: 2372.6 mL / OUT: 3250 mL / NET: -877.4 mL    16 Jun 2022 07:01  -  16 Jun 2022 21:49  --------------------------------------------------------  IN: 1332.5 mL / OUT: 3225 mL / NET: -1892.5 mL      CAPILLARY BLOOD GLUCOSE  POCT Blood Glucose.: 126 mg/dL (16 Jun 2022 21:31)  ============================I/O===========================   I&O's Detail    15 Gus 2022 07:01  -  16 Jun 2022 07:00  --------------------------------------------------------  IN:    Heparin: 360 mL    IV PiggyBack: 250 mL    Milrinone: 261.6 mL    Nitroprusside: 781 mL    Oral Fluid: 720 mL  Total IN: 2372.6 mL    OUT:    Voided (mL): 3250 mL  Total OUT: 3250 mL    Total NET: -877.4 mL    16 Jun 2022 07:01  -  16 Jun 2022 21:49  --------------------------------------------------------  IN:    Heparin: 197 mL    Milrinone: 163.5 mL    Nitroprusside: 492 mL    Oral Fluid: 480 mL  Total IN: 1332.5 mL    OUT:    Voided (mL): 3225 mL  Total OUT: 3225 mL    Total NET: -1892.5 mL  ============================ LABS =========================                  11.9   12.46 )-----------( 234      ( 16 Jun 2022 04:20 )             35.5     06-16    124<L>  |  93<L>  |  23  ----------------------------<  139<H>  5.3   |  20<L>  |  1.21    Ca    8.8      16 Jun 2022 04:20  Phos  3.3     06-16  Mg     2.0     06-16    TPro  6.5  /  Alb  3.2<L>  /  TBili  0.6  /  DBili  x   /  AST  49<H>  /  ALT  48<H>  /  AlkPhos  77  06-16    LIVER FUNCTIONS - ( 16 Jun 2022 04:20 )  Alb: 3.2 g/dL / Pro: 6.5 g/dL / ALK PHOS: 77 U/L / ALT: 48 U/L / AST: 49 U/L / GGT: x           PT/INR - ( 16 Jun 2022 05:42 )   PT: 14.1 sec;   INR: 1.21 ratio    PTT - ( 16 Jun 2022 20:52 )  PTT:45.1 sec    Lactate, Blood: 1.6 mmol/L (06-16-22 @ 04:20)  Lactate, Blood: 1.1 mmol/L (06-15-22 @ 04:39)  Lactate, Blood: 1.4 mmol/L (06-14-22 @ 05:17)  =====================Micro/Rad/Cardio=================  Telemtry: Reviewed   EKG: Reviewed  CXR: Reviewed  Culture: Reviewed   ======================================================  PAST MEDICAL & SURGICAL HISTORY:  AV block    Essential hypertension    Chronic HFrEF (heart failure with reduced ejection fraction)    Chronic kidney disease, unspecified CKD stage    CAD (coronary artery disease)    HLD (hyperlipidemia)    Type 2 diabetes mellitus    Artificial cardiac pacemaker  ====================ASSESSMENT ==============  66 year old man w/ PMHx HTN, HLD, type 2 DM, chronic HFrEF (EF 25-30% by Echo), complete heart block s/p PPM (in 2006, upgraded to BiV-ICD in 09/2016), CAD (s/p recent BERNABE mid LAD at [facility] on 04/18/2022), and stage II CKD (baseline Cr ~1.3) presented with near syncope to OSH found to have 41 episodes of VT on device, s/p unsuccessful VT ablation and transferred to [facility] for management of cardiogenic shock and advanced therapy eval.     Plan:  ====================== NEUROLOGY=====================   AAOx3  - No active issues  - Tylenol PRN for fevers/pain    acetaminophen     Tablet .. 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   - monitor hemodynamics and perfusion indices  - HF following, listed for OHT Status 2E  - C/w isodril      VTach   - s/p EPS on 5/24, unable to perform ablation as no substrate found and did not induce VT because of severely low EF   - Interrogation of ICD @Dr Wilson's office 5/20: back-to-back episodes of VT @ 200+ bpm all terminating with ATP. Since last cath pt has had 41 VT episodes (all falling into VF zone)  - Normal device function. BIV pacing 97%. No programming changes made  - c/w Amiodarone 200 QD  - c/w Toprol 25 daily  - No further VT on inotropic support  - keep K 4-4.5  - EP consult 6/15 for atrial tachycardia, prior VT as assessment of amiodarone; device interrogated, no recorded AT/AF or VT/VF. Per EP, no new interventions for slow NSVT.   -c/w amiodarone and toprol.       CAD   - Chest pain free and compliant with DAPT since last PCI 4/18/22 per discussion with interventional at OSH, ok to DC plavix pending cardiac surgery, last dose 5/28  - Cardiac cath @[facility] 4/18/22: mLAD 90% s/p BERNABE, LCx mild disease, RCA mild disease s/p diagnostic cardiac cath w/ Dr Wagner on 05/23/2022   - C/w ASA, d/c'ed lipitor for elevated LFTs, pending transplant    aspirin enteric coated 81 milliGRAM(s) Oral daily  aMIOdarone    Tablet 200 milliGRAM(s) Oral daily  hydrALAZINE 100 milliGRAM(s) Oral every 8 hours  isosorbide   dinitrate Tablet (ISORDIL) 40 milliGRAM(s) Oral three times a day  metoprolol succinate ER 25 milliGRAM(s) Oral daily  milrinone Infusion 0.5 MICROgram(s)/kG/Min (11 mL/Hr) IV Continuous <Continuous>  nitroprusside Infusion 0.25 MICROgram(s)/kG/Min (2.73 mL/Hr) IV Continuous <Continuous>    ===================HEMATOLOGIC/ONC ===================  Secondary Polycythemia Vera   - elevated EPO, hgb in normal range  - Heme following peripherally  - f/u MELINA-2     AC therapy   - Heparin gtt for IABP    heparin  Infusion 1200 Unit(s)/Hr (16 mL/Hr) IV Continuous <Continuous>    ===================== RENAL =========================  JAGRUTI on CKD II  Admitted w/ Cr 2.01 (baseline Cr ~1.3), initial JAGRUTI 2/2 cardiogenic shock given RHC findings of CI 1.88. Improving  - 6/15 uptrending, likely 2/2 hypovolemia, improved s/p 250cc albumin  - Avoid nephrotoxic agents, NSAIDs. Renally dose meds.  - monitor strict I/Os and continue current cardiac support  - Continue monitoring urine output    Hyponatremia  -Na downtrending, 124 today.   -unlikely iso hypervolemia, CVP today 4.  Either hypovolemic or euvolemic hyponatremia. f/u urine sodium, osm, serum osm.       ==================== GASTROINTESTINAL===================  Elevated Liver Enzymes:  - elevated AST/ALT, normal bilirubin, alk phos. statin d/c 6/13. Downtrending  - Abd US on 6/14: Liver wnl, contracted gallbladder in the setting of recent meal. No acute cholecystitis.    Diet:   - Tolerating PO diet, Constant carb    Constipation:  - Cont. bowel regimen Miralax, Senna, PRN simethicone and bisacodyl   - s/p colonoscopy 6/3 with 5 polyps removed and biopsied, benign findings- GI recs for 3 yr follow up colo  - Protonix for GI prophylaxis    bisacodyl Suppository 10 milliGRAM(s) Rectal daily PRN Constipation  pantoprazole    Tablet 40 milliGRAM(s) Oral before breakfast  polyethylene glycol 3350 17 Gram(s) Oral daily  senna 1 Tablet(s) Oral at bedtime  simethicone 80 milliGRAM(s) Chew every 6 hours PRN Gas    =======================    ENDOCRINE  =====================  T2DM (A1C 6.2 on admission)  - Cont. ISS, glucose controlled, monitor FS closely     Gout flare, Left knee (previously R)  - continue allopurinol, s/p prednisone  - 6/15: with right wrist pain, left knee and left ankle pain possible 2/2 gout flare,  xray wrist with findings c/w OA    allopurinol 100 milliGRAM(s) Oral daily  insulin lispro (ADMELOG) corrective regimen sliding scale   SubCutaneous at bedtime  insulin lispro (ADMELOG) corrective regimen sliding scale   SubCutaneous three times a day before meals    ========================INFECTIOUS DISEASE================  RUE Thrombophlebitis  - RUE US Duplex showing superficial thrombus  - s/p course of ancef    Febrile 6/7-8 overnight x1   - Temp 99.4 F  - UA neg, BCx on 6/7 NGTD x2, RVP neg   - follow fever curve, WBC       Patient requires continuous monitoring with bedside rhythm monitoring, pulse ox monitoring, and intermittent blood gas analysis. Care plan discussed with ICU care team. Patient remained critical and at risk for life threatening decompensation.  Patient seen, examined and plan discussed with CCU team during rounds.     I have personally provided 30 minutes of critical care time excluding time spent on separate procedures, in addition to initial critical care time provided by the CICU Attending, Dr. Blandon.     By signing my name below, I, Camila Martinez, attest that this documentation has been prepared under the direction and in the presence of Declan Crump NP   Electronically signed: Licha Lassiter, 06-16-22 @ 21:49    I, Declan Crump NP, personally performed the services described in this documentation. all medical record entries made by the licha were at my direction and in my presence. I have reviewed the chart and agree that the record reflects my personal performance and is accurate and complete  Electronically signed: Declan Crump NP

## 2022-06-16 NOTE — PROGRESS NOTE ADULT - SUBJECTIVE AND OBJECTIVE BOX
Authored by Sumi Pantoja MD, PGY2  PATIENT:  [facility]  [id number]    CHIEF COMPLAINT:  Patient is a 66y old  Male who presents with a chief complaint of LVAD/OHT eval (15 Gus 2022 23:17)      INTERVAL HISTORY OVERNIGHT EVENTS: DEON overnight.       MEDICATIONS:  MEDICATIONS  (STANDING):  allopurinol 100 milliGRAM(s) Oral daily  aMIOdarone    Tablet 200 milliGRAM(s) Oral daily  aspirin enteric coated 81 milliGRAM(s) Oral daily  chlorhexidine 2% Cloths 1 Application(s) Topical daily  chlorhexidine 4% Liquid 1 Application(s) Topical <User Schedule>  heparin  Infusion 1200 Unit(s)/Hr (15.5 mL/Hr) IV Continuous <Continuous>  hydrALAZINE 100 milliGRAM(s) Oral every 8 hours  insulin lispro (ADMELOG) corrective regimen sliding scale   SubCutaneous at bedtime  insulin lispro (ADMELOG) corrective regimen sliding scale   SubCutaneous three times a day before meals  isosorbide   dinitrate Tablet (ISORDIL) 40 milliGRAM(s) Oral three times a day  lidocaine   4% Patch 1 Patch Transdermal daily  metoprolol succinate ER 25 milliGRAM(s) Oral daily  milrinone Infusion 0.5 MICROgram(s)/kG/Min (11 mL/Hr) IV Continuous <Continuous>  nitroprusside Infusion 0.25 MICROgram(s)/kG/Min (2.73 mL/Hr) IV Continuous <Continuous>  pantoprazole    Tablet 40 milliGRAM(s) Oral before breakfast  polyethylene glycol 3350 17 Gram(s) Oral daily  senna 1 Tablet(s) Oral at bedtime    MEDICATIONS  (PRN):  acetaminophen     Tablet .. 650 milliGRAM(s) Oral every 6 hours PRN Temp greater or equal to 38C (100.4F), Mild Pain (1 - 3)  bisacodyl Suppository 10 milliGRAM(s) Rectal daily PRN Constipation  simethicone 80 milliGRAM(s) Chew every 6 hours PRN Gas  sodium chloride 0.65% Nasal 1 Spray(s) Both Nostrils every 6 hours PRN Nasal Congestion      ALLERGIES:  Allergies    No Known Allergies    Intolerances        OBJECTIVE:  ICU Vital Signs Last 24 Hrs  T(C): 36.7 (16 Jun 2022 04:00), Max: 37.2 (15 Gus 2022 16:00)  T(F): 98.1 (16 Jun 2022 04:00), Max: 99 (15 Gus 2022 16:00)  HR: 89 (16 Jun 2022 07:30) (78 - 116)  BP: --  BP(mean): --  ABP: --  ABP(mean): --  RR: 18 (16 Jun 2022 07:30) (12 - 32)  SpO2: 95% (16 Jun 2022 07:30) (90% - 95%)        CAPILLARY BLOOD GLUCOSE      POCT Blood Glucose.: 161 mg/dL (15 Gus 2022 22:12)  POCT Blood Glucose.: 131 mg/dL (15 Gus 2022 17:15)  POCT Blood Glucose.: 137 mg/dL (15 Gus 2022 12:15)  POCT Blood Glucose.: 126 mg/dL (15 Gus 2022 08:41)    CAPILLARY BLOOD GLUCOSE      POCT Blood Glucose.: 161 mg/dL (15 Gus 2022 22:12)    I&O's Summary    15 Gus 2022 07:01  -  16 Jun 2022 07:00  --------------------------------------------------------  IN: 2372.6 mL / OUT: 3250 mL / NET: -877.4 mL    16 Jun 2022 07:01  -  16 Jun 2022 07:48  --------------------------------------------------------  IN: 0 mL / OUT: 450 mL / NET: -450 mL        PHYSICAL EXAMINATION:  Appearance: Normal, NAD  HEAD:  Normocephalic  EYES:  PERRLA, conjunctiva and sclera clear  NECK: Supple, No JVD  CHEST/LUNG: Clear to auscultation bilaterally; No wheezing  HEART: Normal S1, S2. No rubs, or gallops  ABDOMEN: + Bowel sounds, Soft, NT, ND   EXTREMITIES:  2+ Peripheral Pulses, No clubbing, cyanosis, or edema. b/l Knees equal in temperature, no effusions noted, patient does not want to flex knee.   NEUROLOGY: non-focal, aaox3  SKIN: No rashes or lesions        LABS:                          11.9   12.46 )-----------( 234      ( 16 Jun 2022 04:20 )             35.5     06-16    124<L>  |  93<L>  |  23  ----------------------------<  139<H>  5.3   |  20<L>  |  1.21    Ca    8.8      16 Jun 2022 04:20  Phos  3.3     06-16  Mg     2.0     06-16    TPro  6.5  /  Alb  3.2<L>  /  TBili  0.6  /  DBili  x   /  AST  49<H>  /  ALT  48<H>  /  AlkPhos  77  06-16    LIVER FUNCTIONS - ( 16 Jun 2022 04:20 )  Alb: 3.2 g/dL / Pro: 6.5 g/dL / ALK PHOS: 77 U/L / ALT: 48 U/L / AST: 49 U/L / GGT: x           PT/INR - ( 16 Jun 2022 05:42 )   PT: 14.1 sec;   INR: 1.21 ratio         PTT - ( 16 Jun 2022 05:42 )  PTT:45.4 sec            TELEMETRY:     EKG:     IMAGING:       Authored by Sumi Pantoja MD, PGY2  PATIENT:  [facility]  [id number]    CHIEF COMPLAINT:  Patient is a 66y old  Male who presents with a chief complaint of LVAD/OHT eval (15 Gus 2022 23:17)      INTERVAL HISTORY OVERNIGHT EVENTS: DEON overnight. Patient with mild and improving left knee and ankle pain. Eating and drinking well.        MEDICATIONS:  MEDICATIONS  (STANDING):  allopurinol 100 milliGRAM(s) Oral daily  aMIOdarone    Tablet 200 milliGRAM(s) Oral daily  aspirin enteric coated 81 milliGRAM(s) Oral daily  chlorhexidine 2% Cloths 1 Application(s) Topical daily  chlorhexidine 4% Liquid 1 Application(s) Topical <User Schedule>  heparin  Infusion 1200 Unit(s)/Hr (15.5 mL/Hr) IV Continuous <Continuous>  hydrALAZINE 100 milliGRAM(s) Oral every 8 hours  insulin lispro (ADMELOG) corrective regimen sliding scale   SubCutaneous at bedtime  insulin lispro (ADMELOG) corrective regimen sliding scale   SubCutaneous three times a day before meals  isosorbide   dinitrate Tablet (ISORDIL) 40 milliGRAM(s) Oral three times a day  lidocaine   4% Patch 1 Patch Transdermal daily  metoprolol succinate ER 25 milliGRAM(s) Oral daily  milrinone Infusion 0.5 MICROgram(s)/kG/Min (11 mL/Hr) IV Continuous <Continuous>  nitroprusside Infusion 0.25 MICROgram(s)/kG/Min (2.73 mL/Hr) IV Continuous <Continuous>  pantoprazole    Tablet 40 milliGRAM(s) Oral before breakfast  polyethylene glycol 3350 17 Gram(s) Oral daily  senna 1 Tablet(s) Oral at bedtime    MEDICATIONS  (PRN):  acetaminophen     Tablet .. 650 milliGRAM(s) Oral every 6 hours PRN Temp greater or equal to 38C (100.4F), Mild Pain (1 - 3)  bisacodyl Suppository 10 milliGRAM(s) Rectal daily PRN Constipation  simethicone 80 milliGRAM(s) Chew every 6 hours PRN Gas  sodium chloride 0.65% Nasal 1 Spray(s) Both Nostrils every 6 hours PRN Nasal Congestion      ALLERGIES:  Allergies    No Known Allergies    Intolerances        OBJECTIVE:  ICU Vital Signs Last 24 Hrs  T(C): 36.7 (16 Jun 2022 04:00), Max: 37.2 (15 Gus 2022 16:00)  T(F): 98.1 (16 Jun 2022 04:00), Max: 99 (15 Gus 2022 16:00)  HR: 89 (16 Jun 2022 07:30) (78 - 116)  BP: --  BP(mean): --  ABP: --  ABP(mean): --  RR: 18 (16 Jun 2022 07:30) (12 - 32)  SpO2: 95% (16 Jun 2022 07:30) (90% - 95%)        CAPILLARY BLOOD GLUCOSE      POCT Blood Glucose.: 161 mg/dL (15 Gus 2022 22:12)  POCT Blood Glucose.: 131 mg/dL (15 Gus 2022 17:15)  POCT Blood Glucose.: 137 mg/dL (15 Gus 2022 12:15)  POCT Blood Glucose.: 126 mg/dL (15 Gus 2022 08:41)    CAPILLARY BLOOD GLUCOSE      POCT Blood Glucose.: 161 mg/dL (15 Gus 2022 22:12)    I&O's Summary    15 Gus 2022 07:01  -  16 Jun 2022 07:00  --------------------------------------------------------  IN: 2372.6 mL / OUT: 3250 mL / NET: -877.4 mL    16 Jun 2022 07:01  -  16 Jun 2022 07:48  --------------------------------------------------------  IN: 0 mL / OUT: 450 mL / NET: -450 mL        PHYSICAL EXAMINATION:  Appearance: Normal, NAD  HEAD:  Normocephalic  EYES:  EOMI, conjunctiva and sclera clear  NECK: Supple, No JVD  CHEST/LUNG: Clear to auscultation bilaterally; No wheezing  HEART: Normal S1, S2. No rubs, or gallops  ABDOMEN: + Bowel sounds, Soft, NT, ND   EXTREMITIES:  2+ Peripheral Pulses, No clubbing, cyanosis, or edema. b/l Knees equal in temperature, no effusions noted, patient does not want to flex knee.   NEUROLOGY: non-focal, aaox3  SKIN: No rashes or lesions        LABS:                          11.9   12.46 )-----------( 234      ( 16 Jun 2022 04:20 )             35.5     06-16    124<L>  |  93<L>  |  23  ----------------------------<  139<H>  5.3   |  20<L>  |  1.21    Ca    8.8      16 Jun 2022 04:20  Phos  3.3     06-16  Mg     2.0     06-16    TPro  6.5  /  Alb  3.2<L>  /  TBili  0.6  /  DBili  x   /  AST  49<H>  /  ALT  48<H>  /  AlkPhos  77  06-16    LIVER FUNCTIONS - ( 16 Jun 2022 04:20 )  Alb: 3.2 g/dL / Pro: 6.5 g/dL / ALK PHOS: 77 U/L / ALT: 48 U/L / AST: 49 U/L / GGT: x           PT/INR - ( 16 Jun 2022 05:42 )   PT: 14.1 sec;   INR: 1.21 ratio         PTT - ( 16 Jun 2022 05:42 )  PTT:45.4 sec            TELEMETRY:     EKG:     IMAGING:       Authored by Sumi Pantoja MD, PGY2  PATIENT:  [facility]  [id number]    CHIEF COMPLAINT:  Patient is a 66y old  Male who presents with a chief complaint of LVAD/OHT eval (15 Gus 2022 23:17)      INTERVAL HISTORY OVERNIGHT EVENTS: DEON overnight. Patient with mild and improving left knee and ankle pain. Eating and drinking well.        MEDICATIONS:  MEDICATIONS  (STANDING):  allopurinol 100 milliGRAM(s) Oral daily  aMIOdarone    Tablet 200 milliGRAM(s) Oral daily  aspirin enteric coated 81 milliGRAM(s) Oral daily  chlorhexidine 2% Cloths 1 Application(s) Topical daily  chlorhexidine 4% Liquid 1 Application(s) Topical <User Schedule>  heparin  Infusion 1200 Unit(s)/Hr (15.5 mL/Hr) IV Continuous <Continuous>  hydrALAZINE 100 milliGRAM(s) Oral every 8 hours  insulin lispro (ADMELOG) corrective regimen sliding scale   SubCutaneous at bedtime  insulin lispro (ADMELOG) corrective regimen sliding scale   SubCutaneous three times a day before meals  isosorbide   dinitrate Tablet (ISORDIL) 40 milliGRAM(s) Oral three times a day  lidocaine   4% Patch 1 Patch Transdermal daily  metoprolol succinate ER 25 milliGRAM(s) Oral daily  milrinone Infusion 0.5 MICROgram(s)/kG/Min (11 mL/Hr) IV Continuous <Continuous>  nitroprusside Infusion 0.25 MICROgram(s)/kG/Min (2.73 mL/Hr) IV Continuous <Continuous>  pantoprazole    Tablet 40 milliGRAM(s) Oral before breakfast  polyethylene glycol 3350 17 Gram(s) Oral daily  senna 1 Tablet(s) Oral at bedtime    MEDICATIONS  (PRN):  acetaminophen     Tablet .. 650 milliGRAM(s) Oral every 6 hours PRN Temp greater or equal to 38C (100.4F), Mild Pain (1 - 3)  bisacodyl Suppository 10 milliGRAM(s) Rectal daily PRN Constipation  simethicone 80 milliGRAM(s) Chew every 6 hours PRN Gas  sodium chloride 0.65% Nasal 1 Spray(s) Both Nostrils every 6 hours PRN Nasal Congestion      ALLERGIES:  Allergies    No Known Allergies    Intolerances        OBJECTIVE:  ICU Vital Signs Last 24 Hrs  T(C): 36.7 (16 Jun 2022 04:00), Max: 37.2 (15 Gus 2022 16:00)  T(F): 98.1 (16 Jun 2022 04:00), Max: 99 (15 Gus 2022 16:00)  HR: 89 (16 Jun 2022 07:30) (78 - 116)  BP: --  BP(mean): --  ABP: --  ABP(mean): --  RR: 18 (16 Jun 2022 07:30) (12 - 32)  SpO2: 95% (16 Jun 2022 07:30) (90% - 95%)        CAPILLARY BLOOD GLUCOSE      POCT Blood Glucose.: 161 mg/dL (15 Gus 2022 22:12)  POCT Blood Glucose.: 131 mg/dL (15 Gus 2022 17:15)  POCT Blood Glucose.: 137 mg/dL (15 Gus 2022 12:15)  POCT Blood Glucose.: 126 mg/dL (15 Gus 2022 08:41)    CAPILLARY BLOOD GLUCOSE      POCT Blood Glucose.: 161 mg/dL (15 Gus 2022 22:12)    I&O's Summary    15 Gus 2022 07:01  -  16 Jun 2022 07:00  --------------------------------------------------------  IN: 2372.6 mL / OUT: 3250 mL / NET: -877.4 mL    16 Jun 2022 07:01  -  16 Jun 2022 07:48  --------------------------------------------------------  IN: 0 mL / OUT: 450 mL / NET: -450 mL        PHYSICAL EXAMINATION:  Appearance: Normal, NAD  HEAD:  Normocephalic  EYES:  EOMI, conjunctiva and sclera clear  NECK: Supple, No JVD  CHEST/LUNG: Clear to auscultation bilaterally; No wheezing  HEART: Normal S1, S2. No rubs, or gallops  ABDOMEN: + Bowel sounds, Soft, NT, ND   EXTREMITIES:  2+ Peripheral Pulses, No clubbing, cyanosis, or edema. b/l Knees equal in temperature, no effusions noted  NEUROLOGY: non-focal, aaox3  SKIN: No rashes or lesions        LABS:                          11.9   12.46 )-----------( 234      ( 16 Jun 2022 04:20 )             35.5     06-16    124<L>  |  93<L>  |  23  ----------------------------<  139<H>  5.3   |  20<L>  |  1.21    Ca    8.8      16 Jun 2022 04:20  Phos  3.3     06-16  Mg     2.0     06-16    TPro  6.5  /  Alb  3.2<L>  /  TBili  0.6  /  DBili  x   /  AST  49<H>  /  ALT  48<H>  /  AlkPhos  77  06-16    LIVER FUNCTIONS - ( 16 Jun 2022 04:20 )  Alb: 3.2 g/dL / Pro: 6.5 g/dL / ALK PHOS: 77 U/L / ALT: 48 U/L / AST: 49 U/L / GGT: x           PT/INR - ( 16 Jun 2022 05:42 )   PT: 14.1 sec;   INR: 1.21 ratio         PTT - ( 16 Jun 2022 05:42 )  PTT:45.4 sec            TELEMETRY:     EKG:     IMAGING:

## 2022-06-17 LAB
ALBUMIN SERPL ELPH-MCNC: 3.3 G/DL — SIGNIFICANT CHANGE UP (ref 3.3–5)
ALBUMIN SERPL ELPH-MCNC: 3.3 G/DL — SIGNIFICANT CHANGE UP (ref 3.3–5)
ALP SERPL-CCNC: 81 U/L — SIGNIFICANT CHANGE UP (ref 40–120)
ALP SERPL-CCNC: 90 U/L — SIGNIFICANT CHANGE UP (ref 40–120)
ALT FLD-CCNC: 45 U/L — SIGNIFICANT CHANGE UP (ref 10–45)
ALT FLD-CCNC: 53 U/L — HIGH (ref 10–45)
ANION GAP SERPL CALC-SCNC: 10 MMOL/L — SIGNIFICANT CHANGE UP (ref 5–17)
ANION GAP SERPL CALC-SCNC: 13 MMOL/L — SIGNIFICANT CHANGE UP (ref 5–17)
APTT BLD: 43.8 SEC — HIGH (ref 27.5–35.5)
APTT BLD: 60.5 SEC — HIGH (ref 27.5–35.5)
APTT BLD: 88.8 SEC — HIGH (ref 27.5–35.5)
AST SERPL-CCNC: 30 U/L — SIGNIFICANT CHANGE UP (ref 10–40)
AST SERPL-CCNC: 34 U/L — SIGNIFICANT CHANGE UP (ref 10–40)
BASOPHILS # BLD AUTO: 0.08 K/UL — SIGNIFICANT CHANGE UP (ref 0–0.2)
BASOPHILS # BLD AUTO: 0.14 K/UL — SIGNIFICANT CHANGE UP (ref 0–0.2)
BASOPHILS NFR BLD AUTO: 0.8 % — SIGNIFICANT CHANGE UP (ref 0–2)
BASOPHILS NFR BLD AUTO: 1.1 % — SIGNIFICANT CHANGE UP (ref 0–2)
BILIRUB SERPL-MCNC: 0.4 MG/DL — SIGNIFICANT CHANGE UP (ref 0.2–1.2)
BILIRUB SERPL-MCNC: 0.6 MG/DL — SIGNIFICANT CHANGE UP (ref 0.2–1.2)
BUN SERPL-MCNC: 20 MG/DL — SIGNIFICANT CHANGE UP (ref 7–23)
BUN SERPL-MCNC: 23 MG/DL — SIGNIFICANT CHANGE UP (ref 7–23)
CALCIUM SERPL-MCNC: 9.1 MG/DL — SIGNIFICANT CHANGE UP (ref 8.4–10.5)
CALCIUM SERPL-MCNC: 9.7 MG/DL — SIGNIFICANT CHANGE UP (ref 8.4–10.5)
CHLORIDE SERPL-SCNC: 98 MMOL/L — SIGNIFICANT CHANGE UP (ref 96–108)
CHLORIDE SERPL-SCNC: 98 MMOL/L — SIGNIFICANT CHANGE UP (ref 96–108)
CO2 SERPL-SCNC: 19 MMOL/L — LOW (ref 22–31)
CO2 SERPL-SCNC: 20 MMOL/L — LOW (ref 22–31)
CREAT SERPL-MCNC: 1.09 MG/DL — SIGNIFICANT CHANGE UP (ref 0.5–1.3)
CREAT SERPL-MCNC: 1.24 MG/DL — SIGNIFICANT CHANGE UP (ref 0.5–1.3)
EGFR: 65 ML/MIN/1.73M2 — SIGNIFICANT CHANGE UP
EGFR: 76 ML/MIN/1.73M2 — SIGNIFICANT CHANGE UP
EOSINOPHIL # BLD AUTO: 0.88 K/UL — HIGH (ref 0–0.5)
EOSINOPHIL # BLD AUTO: 0.94 K/UL — HIGH (ref 0–0.5)
EOSINOPHIL NFR BLD AUTO: 6.8 % — HIGH (ref 0–6)
EOSINOPHIL NFR BLD AUTO: 9.2 % — HIGH (ref 0–6)
GLUCOSE BLDC GLUCOMTR-MCNC: 130 MG/DL — HIGH (ref 70–99)
GLUCOSE BLDC GLUCOMTR-MCNC: 131 MG/DL — HIGH (ref 70–99)
GLUCOSE BLDC GLUCOMTR-MCNC: 142 MG/DL — HIGH (ref 70–99)
GLUCOSE BLDC GLUCOMTR-MCNC: 154 MG/DL — HIGH (ref 70–99)
GLUCOSE SERPL-MCNC: 125 MG/DL — HIGH (ref 70–99)
GLUCOSE SERPL-MCNC: 151 MG/DL — HIGH (ref 70–99)
HCT VFR BLD CALC: 40.3 % — SIGNIFICANT CHANGE UP (ref 39–50)
HCT VFR BLD CALC: 48.2 % — SIGNIFICANT CHANGE UP (ref 39–50)
HGB BLD-MCNC: 13.2 G/DL — SIGNIFICANT CHANGE UP (ref 13–17)
HGB BLD-MCNC: 16.3 G/DL — SIGNIFICANT CHANGE UP (ref 13–17)
IMM GRANULOCYTES NFR BLD AUTO: 1.4 % — SIGNIFICANT CHANGE UP (ref 0–1.5)
IMM GRANULOCYTES NFR BLD AUTO: 1.9 % — HIGH (ref 0–1.5)
INR BLD: 1.15 RATIO — SIGNIFICANT CHANGE UP (ref 0.88–1.16)
LACTATE SERPL-SCNC: 1.3 MMOL/L — SIGNIFICANT CHANGE UP (ref 0.7–2)
LYMPHOCYTES # BLD AUTO: 19 % — SIGNIFICANT CHANGE UP (ref 13–44)
LYMPHOCYTES # BLD AUTO: 2.25 K/UL — SIGNIFICANT CHANGE UP (ref 1–3.3)
LYMPHOCYTES # BLD AUTO: 2.46 K/UL — SIGNIFICANT CHANGE UP (ref 1–3.3)
LYMPHOCYTES # BLD AUTO: 22.1 % — SIGNIFICANT CHANGE UP (ref 13–44)
MAGNESIUM SERPL-MCNC: 2 MG/DL — SIGNIFICANT CHANGE UP (ref 1.6–2.6)
MAGNESIUM SERPL-MCNC: 2 MG/DL — SIGNIFICANT CHANGE UP (ref 1.6–2.6)
MCHC RBC-ENTMCNC: 29 PG — SIGNIFICANT CHANGE UP (ref 27–34)
MCHC RBC-ENTMCNC: 29.7 PG — SIGNIFICANT CHANGE UP (ref 27–34)
MCHC RBC-ENTMCNC: 32.8 GM/DL — SIGNIFICANT CHANGE UP (ref 32–36)
MCHC RBC-ENTMCNC: 33.8 GM/DL — SIGNIFICANT CHANGE UP (ref 32–36)
MCV RBC AUTO: 87.8 FL — SIGNIFICANT CHANGE UP (ref 80–100)
MCV RBC AUTO: 88.6 FL — SIGNIFICANT CHANGE UP (ref 80–100)
MONOCYTES # BLD AUTO: 0.84 K/UL — SIGNIFICANT CHANGE UP (ref 0–0.9)
MONOCYTES # BLD AUTO: 1.25 K/UL — HIGH (ref 0–0.9)
MONOCYTES NFR BLD AUTO: 8.3 % — SIGNIFICANT CHANGE UP (ref 2–14)
MONOCYTES NFR BLD AUTO: 9.7 % — SIGNIFICANT CHANGE UP (ref 2–14)
NEUTROPHILS # BLD AUTO: 5.92 K/UL — SIGNIFICANT CHANGE UP (ref 1.8–7.4)
NEUTROPHILS # BLD AUTO: 7.95 K/UL — HIGH (ref 1.8–7.4)
NEUTROPHILS NFR BLD AUTO: 58.2 % — SIGNIFICANT CHANGE UP (ref 43–77)
NEUTROPHILS NFR BLD AUTO: 61.5 % — SIGNIFICANT CHANGE UP (ref 43–77)
NRBC # BLD: 0 /100 WBCS — SIGNIFICANT CHANGE UP (ref 0–0)
NRBC # BLD: 0 /100 WBCS — SIGNIFICANT CHANGE UP (ref 0–0)
PHOSPHATE SERPL-MCNC: 3.2 MG/DL — SIGNIFICANT CHANGE UP (ref 2.5–4.5)
PHOSPHATE SERPL-MCNC: 3.3 MG/DL — SIGNIFICANT CHANGE UP (ref 2.5–4.5)
PLATELET # BLD AUTO: 180 K/UL — SIGNIFICANT CHANGE UP (ref 150–400)
PLATELET # BLD AUTO: 317 K/UL — SIGNIFICANT CHANGE UP (ref 150–400)
POTASSIUM SERPL-MCNC: 4.4 MMOL/L — SIGNIFICANT CHANGE UP (ref 3.5–5.3)
POTASSIUM SERPL-MCNC: 4.4 MMOL/L — SIGNIFICANT CHANGE UP (ref 3.5–5.3)
POTASSIUM SERPL-SCNC: 4.4 MMOL/L — SIGNIFICANT CHANGE UP (ref 3.5–5.3)
POTASSIUM SERPL-SCNC: 4.4 MMOL/L — SIGNIFICANT CHANGE UP (ref 3.5–5.3)
PROT SERPL-MCNC: 6.6 G/DL — SIGNIFICANT CHANGE UP (ref 6–8.3)
PROT SERPL-MCNC: 7.2 G/DL — SIGNIFICANT CHANGE UP (ref 6–8.3)
PROTHROM AB SERPL-ACNC: 13.2 SEC — SIGNIFICANT CHANGE UP (ref 10.5–13.4)
RBC # BLD: 4.55 M/UL — SIGNIFICANT CHANGE UP (ref 4.2–5.8)
RBC # BLD: 5.49 M/UL — SIGNIFICANT CHANGE UP (ref 4.2–5.8)
RBC # FLD: 15.8 % — HIGH (ref 10.3–14.5)
RBC # FLD: 15.8 % — HIGH (ref 10.3–14.5)
SODIUM SERPL-SCNC: 128 MMOL/L — LOW (ref 135–145)
SODIUM SERPL-SCNC: 130 MMOL/L — LOW (ref 135–145)
WBC # BLD: 10.17 K/UL — SIGNIFICANT CHANGE UP (ref 3.8–10.5)
WBC # BLD: 12.92 K/UL — HIGH (ref 3.8–10.5)
WBC # FLD AUTO: 10.17 K/UL — SIGNIFICANT CHANGE UP (ref 3.8–10.5)
WBC # FLD AUTO: 12.92 K/UL — HIGH (ref 3.8–10.5)

## 2022-06-17 PROCEDURE — 99292 CRITICAL CARE ADDL 30 MIN: CPT

## 2022-06-17 PROCEDURE — 99223 1ST HOSP IP/OBS HIGH 75: CPT | Mod: GC

## 2022-06-17 PROCEDURE — 71045 X-RAY EXAM CHEST 1 VIEW: CPT | Mod: 26

## 2022-06-17 PROCEDURE — 99291 CRITICAL CARE FIRST HOUR: CPT

## 2022-06-17 PROCEDURE — 93010 ELECTROCARDIOGRAM REPORT: CPT

## 2022-06-17 RX ADMIN — ISOSORBIDE DINITRATE 40 MILLIGRAM(S): 5 TABLET ORAL at 15:06

## 2022-06-17 RX ADMIN — CHLORHEXIDINE GLUCONATE 1 APPLICATION(S): 213 SOLUTION TOPICAL at 19:46

## 2022-06-17 RX ADMIN — Medication 100 MILLIGRAM(S): at 15:07

## 2022-06-17 RX ADMIN — Medication 25 MILLIGRAM(S): at 06:04

## 2022-06-17 RX ADMIN — PANTOPRAZOLE SODIUM 40 MILLIGRAM(S): 20 TABLET, DELAYED RELEASE ORAL at 06:03

## 2022-06-17 RX ADMIN — CHLORHEXIDINE GLUCONATE 1 APPLICATION(S): 213 SOLUTION TOPICAL at 19:45

## 2022-06-17 RX ADMIN — ISOSORBIDE DINITRATE 40 MILLIGRAM(S): 5 TABLET ORAL at 21:19

## 2022-06-17 RX ADMIN — Medication 100 MILLIGRAM(S): at 21:19

## 2022-06-17 RX ADMIN — Medication 100 MILLIGRAM(S): at 06:03

## 2022-06-17 RX ADMIN — MILRINONE LACTATE 11 MICROGRAM(S)/KG/MIN: 1 INJECTION, SOLUTION INTRAVENOUS at 15:15

## 2022-06-17 RX ADMIN — SODIUM NITROPRUSSIDE 2.73 MICROGRAM(S)/KG/MIN: 50 INJECTION INTRAVENOUS at 15:14

## 2022-06-17 RX ADMIN — HEPARIN SODIUM 17 UNIT(S)/HR: 5000 INJECTION INTRAVENOUS; SUBCUTANEOUS at 15:07

## 2022-06-17 RX ADMIN — AMIODARONE HYDROCHLORIDE 200 MILLIGRAM(S): 400 TABLET ORAL at 06:04

## 2022-06-17 RX ADMIN — Medication 400 MILLIGRAM(S): at 00:24

## 2022-06-17 RX ADMIN — Medication 100 MILLIGRAM(S): at 11:39

## 2022-06-17 RX ADMIN — Medication 81 MILLIGRAM(S): at 11:39

## 2022-06-17 RX ADMIN — HEPARIN SODIUM 17 UNIT(S)/HR: 5000 INJECTION INTRAVENOUS; SUBCUTANEOUS at 08:38

## 2022-06-17 RX ADMIN — CHLORHEXIDINE GLUCONATE 1 APPLICATION(S): 213 SOLUTION TOPICAL at 06:04

## 2022-06-17 RX ADMIN — MILRINONE LACTATE 11 MICROGRAM(S)/KG/MIN: 1 INJECTION, SOLUTION INTRAVENOUS at 08:38

## 2022-06-17 RX ADMIN — ISOSORBIDE DINITRATE 40 MILLIGRAM(S): 5 TABLET ORAL at 06:03

## 2022-06-17 RX ADMIN — SODIUM NITROPRUSSIDE 2.73 MICROGRAM(S)/KG/MIN: 50 INJECTION INTRAVENOUS at 08:39

## 2022-06-17 RX ADMIN — Medication 1000 MILLIGRAM(S): at 00:30

## 2022-06-17 NOTE — PROGRESS NOTE ADULT - SUBJECTIVE AND OBJECTIVE BOX
GOCOOL, LENNOX  MRN-[id number]  Patient is a 66y old  Male who presents with a chief complaint of LVAD/OHT eval (17 Jun 2022 15:34)    HPI:  66M Mixed Ischemic/NICM Cardiomyopathy (EF 25-30% at baseline, s/p BIV-ICD), CAD (medically managed MIs in 2008,2011, Most recent stent in April 2022 to mLAD) presented with multiple near syncope, found to have 41 episodes of VT and admitted initially to cardiac telemetry for further evaluation/management. Ischemic evaluation without new disease and VT ablation without substrate to complete ablation.   Transferred from [facility] for further management and evaluation for LVAD vs OHT.    (26 May 2022 20:31)      Hospital Course:  5/26 Transferred to CCU for further management     24 HOUR EVENTS:    REVIEW OF SYSTEMS:    CONSTITUTIONAL: No weakness, fevers or chills  EYES/ENT: No visual changes;  No vertigo or throat pain   NECK: No pain or stiffness  RESPIRATORY: No cough, wheezing, hemoptysis; No shortness of breath  CARDIOVASCULAR: No chest pain or palpitations  GASTROINTESTINAL: No abdominal or epigastric pain. No nausea, vomiting, or hematemesis; No diarrhea or constipation. No melena or hematochezia.  GENITOURINARY: No dysuria, frequency or hematuria  NEUROLOGICAL: No numbness or weakness  SKIN: No itching, rashes      ICU Vital Signs Last 24 Hrs  T(C): 37.3 (17 Jun 2022 19:00), Max: 37.3 (17 Jun 2022 04:00)  T(F): 99.1 (17 Jun 2022 19:00), Max: 99.1 (17 Jun 2022 04:00)  HR: 80 (17 Jun 2022 21:00) (76 - 116)  BP: --  BP(mean): --  ABP: --  ABP(mean): --  RR: 20 (17 Jun 2022 21:00) (15 - 31)  SpO2: 91% (17 Jun 2022 21:00) (90% - 96%)      CVP(mm Hg): 4 (06-16-22 @ 13:30) (4 - 4)  CO: --  CI: --  PA: --  PA(mean): --  PA(direct): --  PCWP: --  LA: --  RA: --  SVR: --  SVRI: --  PVR: --  PVRI: --  I&O's Summary    16 Jun 2022 07:01  -  17 Jun 2022 07:00  --------------------------------------------------------  IN: 1767.1 mL / OUT: 4275 mL / NET: -2507.9 mL    17 Jun 2022 07:01  -  17 Jun 2022 21:03  --------------------------------------------------------  IN: 1185.7 mL / OUT: 2250 mL / NET: -1064.3 mL        CAPILLARY BLOOD GLUCOSE    CAPILLARY BLOOD GLUCOSE      POCT Blood Glucose.: 131 mg/dL (17 Jun 2022 17:24)      PHYSICAL EXAM:  GENERAL: No acute distress, well-developed  HEAD:  Atraumatic, Normocephalic  EYES: EOMI, PERRLA, conjunctiva and sclera clear  NECK: Supple, no lymphadenopathy, no JVD  CHEST/LUNG: CTAB; No wheezes, rales, or rhonchi  HEART: Regular rate and rhythm. Normal S1/S2. No murmurs, rubs, or gallops  ABDOMEN: Soft, non-tender, non-distended; normal bowel sounds, no organomegaly  EXTREMITIES:  2+ peripheral pulses b/l, No clubbing, cyanosis, or edema  NEUROLOGY: A&O x 3, no focal deficits  SKIN: No rashes or lesions    ============================I/O===========================   I&O's Detail    16 Jun 2022 07:01  -  17 Jun 2022 07:00  --------------------------------------------------------  IN:    Heparin: 342 mL    Milrinone: 261.6 mL    Nitroprusside: 683.5 mL    Oral Fluid: 480 mL  Total IN: 1767.1 mL    OUT:    Voided (mL): 4275 mL  Total OUT: 4275 mL    Total NET: -2507.9 mL      17 Jun 2022 07:01  -  17 Jun 2022 21:03  --------------------------------------------------------  IN:    Heparin: 238 mL    Milrinone: 152.9 mL    Nitroprusside: 434.8 mL    Oral Fluid: 360 mL  Total IN: 1185.7 mL    OUT:    Voided (mL): 2250 mL  Total OUT: 2250 mL    Total NET: -1064.3 mL        ============================ LABS =========================                        13.2   12.92 )-----------( 317      ( 17 Jun 2022 13:38 )             40.3     06-17    130<L>  |  98  |  20  ----------------------------<  125<H>  4.4   |  19<L>  |  1.09    Ca    9.7      17 Jun 2022 13:38  Phos  3.3     06-17  Mg     2.0     06-17    TPro  7.2  /  Alb  3.3  /  TBili  0.6  /  DBili  x   /  AST  34  /  ALT  53<H>  /  AlkPhos  90  06-17                LIVER FUNCTIONS - ( 17 Jun 2022 13:38 )  Alb: 3.3 g/dL / Pro: 7.2 g/dL / ALK PHOS: 90 U/L / ALT: 53 U/L / AST: 34 U/L / GGT: x           PT/INR - ( 17 Jun 2022 04:43 )   PT: 13.2 sec;   INR: 1.15 ratio         PTT - ( 17 Jun 2022 13:38 )  PTT:60.5 sec    Lactate, Blood: 1.3 mmol/L (06-17-22 @ 04:43)  Lactate, Blood: 1.6 mmol/L (06-16-22 @ 04:20)  Lactate, Blood: 1.1 mmol/L (06-15-22 @ 04:39)      ======================Micro/Rad/Cardio=================  Telemtry: Reviewed   EKG: Reviewed  CXR: Reviewed  Culture: Reviewed   Echo:   Cath:   ======================================================  PAST MEDICAL & SURGICAL HISTORY:  AV block      Essential hypertension      Chronic HFrEF (heart failure with reduced ejection fraction)      Chronic kidney disease, unspecified CKD stage      CAD (coronary artery disease)      HLD (hyperlipidemia)      Type 2 diabetes mellitus      Artificial cardiac pacemaker        ====================ASSESSMENT ==============  66 year old man w/ PMHx HTN, HLD, type 2 DM, chronic HFrEF (EF 25-30% by Echo), complete heart block s/p PPM (in 2006, upgraded to BiV-ICD in 09/2016), CAD (s/p recent BERNABE mid LAD at [facility] on 04/18/2022), and stage II CKD (baseline Cr ~1.3) presented with near syncope to OSH found to have 41 episodes of VT on device, s/p unsuccessful VT ablation and transferred to [facility] for management of cardiogenic shock and advanced therapy eval.                 Plan:  ====================== NEUROLOGY=====================   AAOx3  - No active issues  - Tylenol PRN for fevers/pain    acetaminophen     Tablet .. 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   - monitor hemodynamics and perfusion indices  - HF following, listed for OHT Status 2E        VTach   - s/p EPS on 5/24, unable to perform ablation as no substrate found and did not induce VT because of severely low EF   - Interrogation of ICD @Dr Wilson's office 5/20: back-to-back episodes of VT @ 200+ bpm all terminating with ATP. Since last cath pt has had 41 VT episodes (all falling into VF zone)  - Normal device function. BIV pacing 97%. No programming changes made  - c/w Amiodarone 200 QD  - c/w Toprol 25 daily  - No further VT on inotropic support  - keep K 4-4.5  - EP consult 6/15 for atrial tachycardia, prior VT as assessment of amiodarone; device interrogated, no recorded AT/AF or VT/VF. Per EP, no new interventions for slow NSVT. c/w amiodarone and toprol.       CAD   - Chest pain free and compliant with DAPT since last PCI 4/18/22 per discussion with interventional at OSH, ok to DC plavix pending cardiac surgery, last dose 5/28  - Cardiac cath @[facility] 4/18/22: mLAD 90% s/p BERNABE, LCx mild disease, RCA mild disease s/p diagnostic cardiac cath w/ Dr Wagner on 05/23/2022   - C/w ASA, d/c'ed lipitor for elevated LFTs, pending transplant    aspirin enteric coated 81 milliGRAM(s) Oral daily  aMIOdarone    Tablet 200 milliGRAM(s) Oral daily  hydrALAZINE 100 milliGRAM(s) Oral every 8 hours  isosorbide   dinitrate Tablet (ISORDIL) 40 milliGRAM(s) Oral three times a day  metoprolol succinate ER 25 milliGRAM(s) Oral daily  milrinone Infusion 0.5 MICROgram(s)/kG/Min (11 mL/Hr) IV Continuous <Continuous>  nitroprusside Infusion 0.25 MICROgram(s)/kG/Min (2.73 mL/Hr) IV Continuous <Continuous>    ===================HEMATOLOGIC/ONC ===================  ?Secondary Polycythemia Vera   - elevated EPO, hgb in normal range  - as per heme low suspicion for PV  - MELINA-2 wnl    AC therapy   - Heparin gtt for IABP    heparin  Infusion 1200 Unit(s)/Hr (17 mL/Hr) IV Continuous <Continuous>    ===================== RENAL =========================  JAGRUTI on CKD II  Admitted w/ Cr 2.01 (baseline Cr ~1.3), initial JAGRUTI 2/2 cardiogenic shock given RHC findings of CI 1.88. Improving  - 6/15 uptrending, likely 2/2 hypovolemia, improved s/p 250cc albumin  - Avoid nephrotoxic agents, NSAIDs. Renally dose meds.  - monitor strict I/Os and continue current cardiac support  - Continue monitoring urine output    Hyponatremia  -Na downtrending, 124 today.   -unlikely iso hypervolemia, CVP 6/16 was 4. Either hypovolemic or euvolemic hyponatremia. Urine studies so far c/w possible SIADH vs poor Na intake. Cardiorenal c/s 6/17.       ==================== GASTROINTESTINAL===================  Elevated Liver Enzymes:  - elevated AST/ALT, normal bilirubin, alk phos. statin d/c 6/13. Downtrending  - Abd US on 6/14: Liver wnl, contracted gallbladder in the setting of recent meal. No acute cholecystitis.    Diet:   - Tolerating PO diet, Constant carb    Constipation:  - Cont. bowel regimen Miralax, Senna, PRN simethicone and bisacodyl   - s/p colonoscopy 6/3 with 5 polyps removed and biopsied, benign findings- GI recs for 3 yr follow up colo  - Protonix for GI prophylaxis    bisacodyl Suppository 10 milliGRAM(s) Rectal daily PRN Constipation  pantoprazole    Tablet 40 milliGRAM(s) Oral before breakfast  polyethylene glycol 3350 17 Gram(s) Oral daily  senna 1 Tablet(s) Oral at bedtime  simethicone 80 milliGRAM(s) Chew every 6 hours PRN Gas    =======================    ENDOCRINE  =====================  T2DM (A1C 6.2 on admission)  - Cont. ISS, glucose controlled, monitor FS closely     Gout flare, Left knee (previously R)  - continue allopurinol, s/p prednisone  - 6/15: with right wrist pain, left knee and left ankle pain possible 2/2 gout flare,  xray wrist with findings c/w OA    allopurinol 100 milliGRAM(s) Oral daily  insulin lispro (ADMELOG) corrective regimen sliding scale   SubCutaneous at bedtime  insulin lispro (ADMELOG) corrective regimen sliding scale   SubCutaneous three times a day before meals    ========================INFECTIOUS DISEASE================  UE Thrombophlebitis  - RUE US Duplex showing superficial thrombus  - s/p course of ancef    Febrile 6/7-8 overnight x1   - Temp 99.1 F  - UA neg, BCx on 6/7 NGTD x2, RVP neg   - follow fever curve, WBC   -CXR 6/17 with b/l patchy opacities c/w atelectasis, encourage incentive spirometer     6/17: Patient's wife updated over the phone       Patient requires continuous monitoring with bedside rhythm monitoring, pulse ox monitoring, and intermittent blood gas analysis. Care plan discussed with ICU care team. Patient remained critical and at risk for life threatening decompensation.  Patient seen, examined and plan discussed with CCU team during rounds.     I have personally provided ____ minutes of critical care time excluding time spent on separate procedures, in addition to initial critical care time provided by the CICU Attending, Dr. Blandon.     By signing my name below, I, Camila Martinez, attest that this documentation has been prepared under the direction and in the presence of RAMSES Huff  Electronically signed: Mini Lassiter, 06-17-22 @ 21:03    I, RAMSES Huff, personally performed the services described in this documentation. all medical record entries made by the scribe were at my direction and in my presence. I have reviewed the chart and agree that the record reflects my personal performance and is accurate and complete  Electronically signed: RAMSES Huff        GOCOOL, LENNOX  MRN-[id number]  Patient is a 66y old  Male who presents with a chief complaint of LVAD/OHT eval (17 Jun 2022 15:34)    HPI:  66M Mixed Ischemic/NICM Cardiomyopathy (EF 25-30% at baseline, s/p BIV-ICD), CAD (medically managed MIs in 2008,2011, Most recent stent in April 2022 to mLAD) presented with multiple near syncope, found to have 41 episodes of VT and admitted initially to cardiac telemetry for further evaluation/management. Ischemic evaluation without new disease and VT ablation without substrate to complete ablation.   Transferred from [facility] for further management and evaluation for LVAD vs OHT.    (26 May 2022 20:31)      24 HOUR EVENTS:  - no acute events    ICU Vital Signs Last 24 Hrs  T(C): 37.3 (17 Jun 2022 19:00), Max: 37.3 (17 Jun 2022 04:00)  T(F): 99.1 (17 Jun 2022 19:00), Max: 99.1 (17 Jun 2022 04:00)  HR: 80 (17 Jun 2022 21:00) (76 - 116)  RR: 20 (17 Jun 2022 21:00) (15 - 31)  SpO2: 91% (17 Jun 2022 21:00) (90% - 96%)    CVP(mm Hg): 4 (06-16-22 @ 13:30) (4 - 4)      I&O's Summary    16 Jun 2022 07:01  -  17 Jun 2022 07:00  --------------------------------------------------------  IN: 1767.1 mL / OUT: 4275 mL / NET: -2507.9 mL    17 Jun 2022 07:01  -  17 Jun 2022 21:03  --------------------------------------------------------  IN: 1185.7 mL / OUT: 2250 mL / NET: -1064.3 mL        CAPILLARY BLOOD GLUCOSE    POCT Blood Glucose.: 131 mg/dL (17 Jun 2022 17:24)      PHYSICAL EXAM:  General: No distress. Comfortable.  HEENT: EOM intact.  Neck: JVP not elevated.  Respiratory: Clear to auscultation bilaterally  CV: RRR. Normal S1 and S2. No murmurs, rub, or gallops. Radial + DP pulses normal.  Abdomen: Soft, non-distended, non-tender  Extremities: Warm, no edema. RFA IABP site clean dry intact no bleeding or hematoma, palpable DP pulse   Neurology: Non-focal, alert and oriented times three. Motor and sensation intact    ============================I/O===========================   I&O's Detail    16 Jun 2022 07:01  -  17 Jun 2022 07:00  --------------------------------------------------------  IN:    Heparin: 342 mL    Milrinone: 261.6 mL    Nitroprusside: 683.5 mL    Oral Fluid: 480 mL  Total IN: 1767.1 mL    OUT:    Voided (mL): 4275 mL  Total OUT: 4275 mL    Total NET: -2507.9 mL      17 Jun 2022 07:01  -  17 Jun 2022 21:03  --------------------------------------------------------  IN:    Heparin: 238 mL    Milrinone: 152.9 mL    Nitroprusside: 434.8 mL    Oral Fluid: 360 mL  Total IN: 1185.7 mL    OUT:    Voided (mL): 2250 mL  Total OUT: 2250 mL    Total NET: -1064.3 mL        ============================ LABS =========================                        13.2   12.92 )-----------( 317      ( 17 Jun 2022 13:38 )             40.3     06-17    130<L>  |  98  |  20  ----------------------------<  125<H>  4.4   |  19<L>  |  1.09    Ca    9.7      17 Jun 2022 13:38  Phos  3.3     06-17  Mg     2.0     06-17    TPro  7.2  /  Alb  3.3  /  TBili  0.6  /  DBili  x   /  AST  34  /  ALT  53<H>  /  AlkPhos  90  06-17      LIVER FUNCTIONS - ( 17 Jun 2022 13:38 )  Alb: 3.3 g/dL / Pro: 7.2 g/dL / ALK PHOS: 90 U/L / ALT: 53 U/L / AST: 34 U/L / GGT: x           PT/INR - ( 17 Jun 2022 04:43 )   PT: 13.2 sec;   INR: 1.15 ratio    PTT - ( 17 Jun 2022 13:38 )  PTT:60.5 sec    Lactate, Blood: 1.3 mmol/L (06-17-22 @ 04:43)  Lactate, Blood: 1.6 mmol/L (06-16-22 @ 04:20)  Lactate, Blood: 1.1 mmol/L (06-15-22 @ 04:39)      ======================Micro/Rad/Cardio=================  Telemtry: Reviewed   EKG: Reviewed  CXR: Reviewed  Culture: Reviewed   Echo:   Cath:   ======================================================  PAST MEDICAL & SURGICAL HISTORY:  AV block      Essential hypertension      Chronic HFrEF (heart failure with reduced ejection fraction)      Chronic kidney disease, unspecified CKD stage      CAD (coronary artery disease)      HLD (hyperlipidemia)      Type 2 diabetes mellitus      Artificial cardiac pacemaker        ====================ASSESSMENT ==============  66 year old man w/ PMHx HTN, HLD, type 2 DM, chronic HFrEF (EF 25-30% by Echo), complete heart block s/p PPM (in 2006, upgraded to BiV-ICD in 09/2016), CAD (s/p recent BERNABE mid LAD at [facility] on 04/18/2022), and stage II CKD (baseline Cr ~1.3) presented with near syncope to OSH found to have 41 episodes of VT on device, s/p unsuccessful VT ablation and transferred to [facility] for management of cardiogenic shock and advanced therapy eval.       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  - bedside nipride study done 5/29 with reduction in PVR to <2    ====================CARDIOVASCULAR==================  Cardiogenic shock  - Maintain IABP 1:1,monitor PTT on heparin gtt  - Maintain Milrinone 0.5mcg/kg/min  - Cont hydral 100 TID and ISDN 40TID and Nipride gtt for AL Reduction, with goal Assisted mean goal 70-80   - monitor hemodynamics and perfusion indices  - HF following, listed for OHT Status 2E      VTach   - s/p EPS on 5/24, unable to perform ablation as no substrate found and did not induce VT because of severely low EF   - Interrogation of ICD @Dr Wilson's office 5/20: back-to-back episodes of VT @ 200+ bpm all terminating with ATP. Since last cath pt has had 41 VT episodes (all falling into VF zone)  - Normal device function. BIV pacing 97%. No programming changes made  - c/w Amiodarone 200 QD  - c/w Toprol 25 daily  - No further VT on inotropic support  - keep K 4-4.5  - EP consult 6/15 for atrial tachycardia, prior VT as assessment of amiodarone; device interrogated, no recorded AT/AF or VT/VF. Per EP, no new interventions for slow NSVT. c/w amiodarone and toprol.    CAD   - Chest pain free and compliant with DAPT since last PCI 4/18/22 per discussion with interventional at OSH, ok to DC plavix pending cardiac surgery, last dose 5/28  - Cardiac cath @[facility] 4/18/22: mLAD 90% s/p BERNABE, LCx mild disease, RCA mild disease s/p diagnostic cardiac cath w/ Dr Wagner on 05/23/2022   - C/w ASA, d/c'ed lipitor for elevated LFTs, pending transplant    ===================HEMATOLOGIC/ONC ===================  ?Secondary Polycythemia Vera   - elevated EPO, hgb in normal range  - as per heme low suspicion for PV  - MELINA-2 wnl    AC therapy   - Heparin gtt for IABP    ===================== RENAL =========================  JAGRUTI on CKD II  Admitted w/ Cr 2.01 (baseline Cr ~1.3), initial JAGRUTI 2/2 cardiogenic shock given RHC findings of CI 1.88. Improving  - Avoid nephrotoxic agents, NSAIDs. Renally dose meds.  - monitor strict I/Os and continue current cardiac support  - Continue monitoring urine output    Hyponatremia  - Na improving  - Urine studies so far c/w possible SIADH vs poor Na intake. Cardiorenal c/s 6/17.     ==================== GASTROINTESTINAL===================  Elevated Liver Enzymes:  - elevated AST/ALT, normal bilirubin, alk phos. statin d/c 6/13. Downtrending  - Abd US on 6/14: Liver wnl, contracted gallbladder in the setting of recent meal. No acute cholecystitis.    Diet:   - Tolerating PO diet, Constant carb    Constipation:  - Cont. bowel regimen Miralax, Senna, PRN simethicone and bisacodyl   - s/p colonoscopy 6/3 with 5 polyps removed and biopsied, benign findings- GI recs for 3 yr follow up colo  - Protonix for GI prophylaxis    =======================    ENDOCRINE  =====================  T2DM (A1C 6.2 on admission)  - Cont. ISS, glucose controlled, monitor FS closely     Gout flare, Left knee (previously R)  - continue allopurinol, s/p prednisone  - 6/15: with right wrist pain, left knee and left ankle pain possible 2/2 gout flare, xray wrist with findings c/w OA    ========================INFECTIOUS DISEASE================  UE Thrombophlebitis  - RUE US Duplex showing superficial thrombus  - s/p course of Ancef    Febrile 6/7-8 overnight x1   - Temp 99.1 F  - UA neg, BCx on 6/7 NGTD x2, RVP neg   - follow fever curve, WBC   -CXR 6/17 with b/l patchy opacities c/w atelectasis, encourage incentive spirometer     6/17: Patient's wife updated over the phone       Patient requires continuous monitoring with bedside rhythm monitoring, pulse ox monitoring, and intermittent blood gas analysis. Care plan discussed with ICU care team. Patient remained critical and at risk for life threatening decompensation.  Patient seen, examined and plan discussed with CCU team during rounds.     I have personally provided 35 minutes of critical care time excluding time spent on separate procedures, in addition to initial critical care time provided by the CICU Attending, Dr. Blandon.    By signing my name below, I, Camila Martinez, attest that this documentation has been prepared under the direction and in the presence of RAMSES Huff  Electronically signed: Mini Lassiter, 06-17-22 @ 21:03    I, RAMSES Huff, personally performed the services described in this documentation. all medical record entries made by the scribe were at my direction and in my presence. I have reviewed the chart and agree that the record reflects my personal performance and is accurate and complete  Electronically signed: RAMSES Huff        GOCOOL, LENNOX  MRN-[id number]  Patient is a 66y old  Male who presents with a chief complaint of LVAD/OHT eval (17 Jun 2022 15:34)    HPI:  66M Mixed Ischemic/NICM Cardiomyopathy (EF 25-30% at baseline, s/p BIV-ICD), CAD (medically managed MIs in 2008,2011, Most recent stent in April 2022 to mLAD) presented with multiple near syncope, found to have 41 episodes of VT and admitted initially to cardiac telemetry for further evaluation/management. Ischemic evaluation without new disease and VT ablation without substrate to complete ablation.   Transferred from [facility] for further management and evaluation for LVAD vs OHT.    (26 May 2022 20:31)      24 HOUR EVENTS:  - no acute events    ICU Vital Signs Last 24 Hrs  T(C): 37.3 (17 Jun 2022 19:00), Max: 37.3 (17 Jun 2022 04:00)  T(F): 99.1 (17 Jun 2022 19:00), Max: 99.1 (17 Jun 2022 04:00)  HR: 80 (17 Jun 2022 21:00) (76 - 116)  RR: 20 (17 Jun 2022 21:00) (15 - 31)  SpO2: 91% (17 Jun 2022 21:00) (90% - 96%)    CVP(mm Hg): 4 (06-16-22 @ 13:30) (4 - 4)      I&O's Summary    16 Jun 2022 07:01  -  17 Jun 2022 07:00  --------------------------------------------------------  IN: 1767.1 mL / OUT: 4275 mL / NET: -2507.9 mL    17 Jun 2022 07:01  -  17 Jun 2022 21:03  --------------------------------------------------------  IN: 1185.7 mL / OUT: 2250 mL / NET: -1064.3 mL        CAPILLARY BLOOD GLUCOSE    POCT Blood Glucose.: 131 mg/dL (17 Jun 2022 17:24)      PHYSICAL EXAM:  General: No distress. Comfortable.  HEENT: EOM intact.  Neck: JVP not elevated.  Respiratory: Clear to auscultation bilaterally  CV: RRR. Normal S1 and S2. No murmurs, rub, or gallops. Radial + DP pulses normal.  Abdomen: Soft, non-distended, non-tender  Extremities: Warm, no edema. RFA IABP site clean dry intact no bleeding or hematoma, palpable DP pulse   Neurology: Non-focal, alert and oriented times three. Motor and sensation intact    ============================I/O===========================   I&O's Detail    16 Jun 2022 07:01  -  17 Jun 2022 07:00  --------------------------------------------------------  IN:    Heparin: 342 mL    Milrinone: 261.6 mL    Nitroprusside: 683.5 mL    Oral Fluid: 480 mL  Total IN: 1767.1 mL    OUT:    Voided (mL): 4275 mL  Total OUT: 4275 mL    Total NET: -2507.9 mL      17 Jun 2022 07:01  -  17 Jun 2022 21:03  --------------------------------------------------------  IN:    Heparin: 238 mL    Milrinone: 152.9 mL    Nitroprusside: 434.8 mL    Oral Fluid: 360 mL  Total IN: 1185.7 mL    OUT:    Voided (mL): 2250 mL  Total OUT: 2250 mL    Total NET: -1064.3 mL        ============================ LABS =========================                        13.2   12.92 )-----------( 317      ( 17 Jun 2022 13:38 )             40.3     06-17    130<L>  |  98  |  20  ----------------------------<  125<H>  4.4   |  19<L>  |  1.09    Ca    9.7      17 Jun 2022 13:38  Phos  3.3     06-17  Mg     2.0     06-17    TPro  7.2  /  Alb  3.3  /  TBili  0.6  /  DBili  x   /  AST  34  /  ALT  53<H>  /  AlkPhos  90  06-17      LIVER FUNCTIONS - ( 17 Jun 2022 13:38 )  Alb: 3.3 g/dL / Pro: 7.2 g/dL / ALK PHOS: 90 U/L / ALT: 53 U/L / AST: 34 U/L / GGT: x           PT/INR - ( 17 Jun 2022 04:43 )   PT: 13.2 sec;   INR: 1.15 ratio    PTT - ( 17 Jun 2022 13:38 )  PTT:60.5 sec    Lactate, Blood: 1.3 mmol/L (06-17-22 @ 04:43)  Lactate, Blood: 1.6 mmol/L (06-16-22 @ 04:20)  Lactate, Blood: 1.1 mmol/L (06-15-22 @ 04:39)      ======================Micro/Rad/Cardio=================  Telemtry: Reviewed   EKG: Reviewed  CXR: Reviewed  Culture: Reviewed   Echo:   Cath:   ======================================================  PAST MEDICAL & SURGICAL HISTORY:  AV block      Essential hypertension      Chronic HFrEF (heart failure with reduced ejection fraction)      Chronic kidney disease, unspecified CKD stage      CAD (coronary artery disease)      HLD (hyperlipidemia)      Type 2 diabetes mellitus      Artificial cardiac pacemaker        ====================ASSESSMENT ==============  66 year old man w/ PMHx HTN, HLD, type 2 DM, chronic HFrEF (EF 25-30% by Echo), complete heart block s/p PPM (in 2006, upgraded to BiV-ICD in 09/2016), CAD (s/p recent BERNABE mid LAD at [facility] on 04/18/2022), and stage II CKD (baseline Cr ~1.3) presented with near syncope to OSH found to have 41 episodes of VT on device, s/p unsuccessful VT ablation and transferred to [facility] for management of cardiogenic shock and advanced therapy eval.       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  - bedside nipride study done 5/29 with reduction in PVR to <2    ====================CARDIOVASCULAR==================  Cardiogenic shock  - Maintain IABP 1:1,monitor PTT on heparin gtt  - Maintain Milrinone 0.5mcg/kg/min  - Cont hydral 100 TID and ISDN 40TID and Nipride gtt for AL Reduction, with goal Assisted mean goal 70-80   - monitor hemodynamics and perfusion indices  - HF following, listed for OHT Status 2E      VTach   - s/p EPS on 5/24, unable to perform ablation as no substrate found and did not induce VT because of severely low EF   - Interrogation of ICD @Dr Wilson's office 5/20: back-to-back episodes of VT @ 200+ bpm all terminating with ATP. Since last cath pt has had 41 VT episodes (all falling into VF zone)  - Normal device function. BIV pacing 97%. No programming changes made  - c/w Amiodarone 200 QD  - c/w Toprol 25 daily  - No further VT on inotropic support  - keep K 4-4.5  - EP consult 6/15 for atrial tachycardia, prior VT as assessment of amiodarone; device interrogated, no recorded AT/AF or VT/VF. Per EP, no new interventions for slow NSVT. c/w amiodarone and toprol.    CAD   - Chest pain free and compliant with DAPT since last PCI 4/18/22 per discussion with interventional at OSH, ok to DC plavix pending cardiac surgery, last dose 5/28  - Cardiac cath @[facility] 4/18/22: mLAD 90% s/p BERNABE, LCx mild disease, RCA mild disease s/p diagnostic cardiac cath w/ Dr Wagner on 05/23/2022   - C/w ASA, d/c'ed lipitor for elevated LFTs, pending transplant    ===================HEMATOLOGIC/ONC ===================  ?Secondary Polycythemia Vera   - elevated EPO, hgb in normal range  - as per heme low suspicion for PV  - MELINA-2 wnl    AC therapy   - Heparin gtt for IABP    ===================== RENAL =========================  JAGRUTI on CKD II  Admitted w/ Cr 2.01 (baseline Cr ~1.3), initial JAGRUTI 2/2 cardiogenic shock given RHC findings of CI 1.88. Improving  - Avoid nephrotoxic agents, NSAIDs. Renally dose meds.  - monitor strict I/Os and continue current cardiac support  - Continue monitoring urine output    Hyponatremia  - Na improving  - Urine studies so far c/w possible SIADH vs poor Na intake. Cardiorenal c/s 6/17.     ==================== GASTROINTESTINAL===================  Elevated Liver Enzymes:  - elevated AST/ALT, normal bilirubin, alk phos. statin d/c 6/13. Downtrending  - Abd US on 6/14: Liver wnl, contracted gallbladder in the setting of recent meal. No acute cholecystitis.    Diet:   - Tolerating PO diet, Constant carb    Constipation:  - Cont. bowel regimen Miralax, Senna, PRN simethicone and bisacodyl   - s/p colonoscopy 6/3 with 5 polyps removed and biopsied, benign findings- GI recs for 3 yr follow up colo  - Protonix for GI prophylaxis    =======================    ENDOCRINE  =====================  T2DM (A1C 6.2 on admission)  - Cont. ISS, glucose controlled, monitor FS closely     Gout flare, Left knee (previously R)  - continue allopurinol, s/p prednisone  - 6/15: with right wrist pain, left knee and left ankle pain possible 2/2 gout flare, xray wrist with findings c/w OA    ========================INFECTIOUS DISEASE================  UE Thrombophlebitis  - RUE US Duplex showing superficial thrombus  - s/p course of Ancef    No active issues  - afebrile, mild leukocytosis  - UA neg, BCx on 6/7 NGTD x2, RVP neg   - follow fever curve, WBC   - CXR 6/17 with b/l patchy opacities c/w atelectasis, encourage incentive spirometer         Patient requires continuous monitoring with bedside rhythm monitoring, pulse ox monitoring, and intermittent blood gas analysis. Care plan discussed with ICU care team. Patient remained critical and at risk for life threatening decompensation.  Patient seen, examined and plan discussed with CCU team during rounds.     I have personally provided 35 minutes of critical care time excluding time spent on separate procedures, in addition to initial critical care time provided by the CICU Attending, Dr. Blandon.    By signing my name below, I, Camila Martinez, attest that this documentation has been prepared under the direction and in the presence of RAMSES Huff  Electronically signed: Mini Lassiter, 06-17-22 @ 21:03    I, RAMSES Huff, personally performed the services described in this documentation. all medical record entries made by the scribe were at my direction and in my presence. I have reviewed the chart and agree that the record reflects my personal performance and is accurate and complete  Electronically signed: RAMSES Huff

## 2022-06-17 NOTE — PROGRESS NOTE ADULT - ASSESSMENT
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 5/20 with near syncope in setting of worsening HF symptoms and found to have 41 episodes of VT, many terminating with ATP. LHC did not reveal new obstructive CAD and he underwent EPS which did not reveal endocardial substrate amenable for ablation. RHC revealed severely depressed cardiac output and he was transferred to [facility] 5/26 for advanced therapies evaluation.    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 He improved significantly with sequential uptitration of inotropes and increased pacing rate, but on 6/6 he had worsening JAGRUTI. He is s/p RHC which revealed severely elevated filling pressures, severe cpcPH, and CI of 1.9 on milrinone 0.5. IABP was placed and his renal function/PAPs have improved. He is MCS dependent and was inadequately supported with high dose inotropes, so was listed UNOS status 2e for OHT, awaiting suitable donor. However, was made status 7 as he became febrile, last 6/7 T 38. He has been afebrile since and blood cultures from 6/7 with NGTD x 48 hours and his leukocytosis has not worsened. Discussed with transplant ID and since bld cx negative x48hrs he has been re-listed UNOS status 2e. He had a mild rise in his Cr earlier this week that has now improved with gentle hydration. He does have a worsening hyponatremia, likely hypovolemic hyponatremia given recurrent net negative fluid balance with last measured CVP of 1. Overall currently stable awaiting suitable donor.     Review of studies  TTE 5/21: LV 5.2 cm, LVEF 10-15%, LVOT VTI 10 cm, moderate RV dysfunction, mild AI, minimal MR  EKG: a-BiV paced  [facility] 5/23: patent mLAD stent with slow flow, D1 with 40-50% stenosis, mild disease otherwise  RHC 5/26: RA 12, PA 70/40 (50), PCWP 22, Milana CO/CI 2.3/1.3, MAP 83 with SVR 2469, PVR 12 PERSAUD    Hemodynamics  5/27 (milrinone 0.25): RA 10, PA 76/35 (49), PCWP 34, PA sat 57% with Milana CO/CI 3.1/1.6, MAP 71 with SVR 1574, PVR 4.8  5/28 (on milrinone 0.375): RA 7, PA 63/31, PCWP 26, PA sat 72.8% with Milana CO/CI 4.9/2.5 (CI later dropped to 1.6 in the afternoon), MAP 70 with SVR 1039, PVR 2.9  5/29 (on milrinone 0.5): RA 8, PA 75/32 (50), PCWP 28, PA sat 74% with Milana CO/CI 5.0/2.6, MAP 77 with SVR 1200, PVR 4.4  5/29 (on milrinone 0.5 and nipride to 3 mcg/kg/min): RA 6, PA 59/31 (40), PCWP 30, PA sat 79% with Milana CO/CI 7.0/3.6, BP 97/57 (MAP 70) with , PVR 1.4  6/6 RHC (mil 0.5): RA 11 (v13), RV 75/17, PA 80/36/52, PCWP 24 (v29), PA sat 59.5%, CO/CI (F) 3.58/1.88  6/7 (mil 0.5, IABP 1:1): CVP 5, PA 66/32/45, PA sat 65.7%, CO/CI (F) 4.0/2.1, SVR 2000  6/8 (mil 0.5, IABP 1:1): CVP 1, PA 46/19/28, PA sat 72.7%, CO/CI (F) 5.6/2.9, SVR 1257  6/8 PM (mil 0.5, IABP 1:1): CVP 4, PA 68/25/38, PA sat 68%, CO/CI (f) 5/2.6, SVR 1326

## 2022-06-17 NOTE — PROGRESS NOTE ADULT - PROBLEM SELECTOR PLAN 7
- Tmax 100.4 on 6/7, cultures NGTD  - appreciate transplant ID recs, fever possibly due to RUE occlusive thrombus seen in right cephalic and basilic veins as seen on VA duplex performed on 6/3, possible thrombophlebitis, was given Ancef 6/2-6/7   - leukocytosis remains elevated, however relatively stable  - RUE doppler 6/3 with no evidence of DVT, but did have an occlusive thrombus within the superficial veins (cephalic and basilic)  - BCx NGTD (6/7), RVP negative, UA negative  - continue to monitor

## 2022-06-17 NOTE — PROGRESS NOTE ADULT - ASSESSMENT
66 year old man w/ PMHx HTN, HLD, type 2 DM, chronic HFrEF (EF 25-30% by Echo), complete heart block s/p PPM (in 2006, upgraded to BiV-ICD in 09/2016), CAD (s/p recent BERNABE mid LAD at [facility] on 04/18/2022), and stage II CKD (baseline Cr ~1.3) presented with near syncope to OSH found to have 41 episodes of VT on device, s/p unsuccessful VT ablation and transferred to [facility] for management of cardiogenic shock and advanced therapy eval.     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   - monitor hemodynamics and perfusion indices  - HF following, listed for OHT Status 2E  - C/w isodril      VTach   - s/p EPS on 5/24, unable to perform ablation as no substrate found and did not induce VT because of severely low EF   - Interrogation of ICD @Dr Wilson's office 5/20: back-to-back episodes of VT @ 200+ bpm all terminating with ATP. Since last cath pt has had 41 VT episodes (all falling into VF zone)  - Normal device function. BIV pacing 97%. No programming changes made  - c/w Amiodarone 200 QD  - c/w Toprol 25 daily  - No further VT on inotropic support  - keep K 4-4.5  - EP consult 6/15 for atrial tachycardia, prior VT as assessment of amiodarone; device interrogated, no recorded AT/AF or VT/VF. Per EP, no new interventions for slow NSVT. c/w amiodarone and toprol.       CAD   - Chest pain free and compliant with DAPT since last PCI 4/18/22 per discussion with interventional at OSH, ok to DC plavix pending cardiac surgery, last dose 5/28  - Cardiac cath @[facility] 4/18/22: mLAD 90% s/p BERNABE, LCx mild disease, RCA mild disease s/p diagnostic cardiac cath w/ Dr Wagner on 05/23/2022   - C/w ASA, d/c lipitor for elevated LFTs, pending transplant      ===================HEMATOLOGIC/ONC ===================  ?Secondary Polycythemia Vera   - elevated EPO, hgb in normal range  - Heme following peripherally  - f/u MELINA-2     AC therapy   - Heparin gtt for IABP    ===================== RENAL =========================  JAGRUTI on CKD II  Admitted w/ Cr 2.01 (baseline Cr ~1.3), initial JAGRUTI 2/2 cardiogenic shock given RHC findings of CI 1.88. Improving  - 6/15 uptrending, likely 2/2 hypovolemia, improved s/p 250cc albumin  - Avoid nephrotoxic agents, NSAIDs. Renally dose meds.  - monitor strict I/Os and continue current cardiac support  - Continue monitoring urine output    #Hyponatremia  -Na downtrending, 124 today.   -unlikely iso hypervolemia, CVP today 4.  Either hypovolemic or euvolemic hyponatremia. f/u urine sodium, osm, serum osm.     ==================== GASTROINTESTINAL===================  Elevated Liver Enzymes:  - elevated AST/ALT, normal bilirubin, alk phos. statin d/c 6/13. Downtrending  - Abd US on 6/14: Liver wnl, contracted gallbladder in the setting of recent meal. No acute cholecystitis.    Diet:   - Tolerating PO diet, Constant carb    Constipation:  - Cont. bowel regimen Miralax, Senna, PRN simethicone and bisacodyl   - s/p colonoscopy 6/3 with 5 polyps removed and biopsied, benign findings- GI recs for 3 yr follow up colo  - Protonix for GI prophylaxis      =======================    ENDOCRINE  =====================  T2DM (A1C 6.2 on admission)  - Cont. ISS, glucose controlled, monitor FS closely     Gout flare, Left knee (previously R)  - continue allopurinol, s/p prednisone  - 6/15: with right wrist pain, left knee and left ankle pain possible 2/2 gout flare,  xray wrist with findings c/w OA        ========================INFECTIOUS DISEASE================  RUE Thrombophlebitis  - RUE US Duplex showing superficial thrombus  - s/p course of ancef    Febrile 6/7-8 overnight x1   - Temp 99.7 F  - UA neg, BCx on 6/7 NGTD x2, RVP neg   - follow fever curve, WBC   -CXR 6/17 with b/l patchy opacities, consider developing pneumonia. In setting of hyponatremia, send urine legionella?  66 year old man w/ PMHx HTN, HLD, type 2 DM, chronic HFrEF (EF 25-30% by Echo), complete heart block s/p PPM (in 2006, upgraded to BiV-ICD in 09/2016), CAD (s/p recent BERNABE mid LAD at [facility] on 04/18/2022), and stage II CKD (baseline Cr ~1.3) presented with near syncope to OSH found to have 41 episodes of VT on device, s/p unsuccessful VT ablation and transferred to [facility] for management of cardiogenic shock and advanced therapy eval.     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   - monitor hemodynamics and perfusion indices  - HF following, listed for OHT Status 2E        VTach   - s/p EPS on 5/24, unable to perform ablation as no substrate found and did not induce VT because of severely low EF   - Interrogation of ICD @Dr Wilson's office 5/20: back-to-back episodes of VT @ 200+ bpm all terminating with ATP. Since last cath pt has had 41 VT episodes (all falling into VF zone)  - Normal device function. BIV pacing 97%. No programming changes made  - c/w Amiodarone 200 QD  - c/w Toprol 25 daily  - No further VT on inotropic support  - keep K 4-4.5  - EP consult 6/15 for atrial tachycardia, prior VT as assessment of amiodarone; device interrogated, no recorded AT/AF or VT/VF. Per EP, no new interventions for slow NSVT. c/w amiodarone and toprol.       CAD   - Chest pain free and compliant with DAPT since last PCI 4/18/22 per discussion with interventional at OSH, ok to DC plavix pending cardiac surgery, last dose 5/28  - Cardiac cath @[facility] 4/18/22: mLAD 90% s/p BERNABE, LCx mild disease, RCA mild disease s/p diagnostic cardiac cath w/ Dr Wagner on 05/23/2022   - C/w ASA, d/c lipitor for elevated LFTs, pending transplant      ===================HEMATOLOGIC/ONC ===================  ?Secondary Polycythemia Vera   - elevated EPO, hgb in normal range  - Heme following peripherally  - MELINA-2 wnl    AC therapy   - Heparin gtt for IABP    ===================== RENAL =========================  JAGRUTI on CKD II  Admitted w/ Cr 2.01 (baseline Cr ~1.3), initial JAGRUTI 2/2 cardiogenic shock given RHC findings of CI 1.88. Improving  - 6/15 uptrending, likely 2/2 hypovolemia, improved s/p 250cc albumin  - Avoid nephrotoxic agents, NSAIDs. Renally dose meds.  - monitor strict I/Os and continue current cardiac support  - Continue monitoring urine output    #Hyponatremia  -Na downtrending, 124 today.   -unlikely iso hypervolemia, CVP 6/16 4. Either hypovolemic or euvolemic hyponatremia. Urine studies so far c/w possible SIADH vs poor Na intake. Cardiorenal c/s 6/17.     ==================== GASTROINTESTINAL===================  Elevated Liver Enzymes:  - elevated AST/ALT, normal bilirubin, alk phos. statin d/c 6/13. Downtrending  - Abd US on 6/14: Liver wnl, contracted gallbladder in the setting of recent meal. No acute cholecystitis.    Diet:   - Tolerating PO diet, Constant carb    Constipation:  - Cont. bowel regimen Miralax, Senna, PRN simethicone and bisacodyl   - s/p colonoscopy 6/3 with 5 polyps removed and biopsied, benign findings- GI recs for 3 yr follow up colo  - Protonix for GI prophylaxis      =======================    ENDOCRINE  =====================  T2DM (A1C 6.2 on admission)  - Cont. ISS, glucose controlled, monitor FS closely     Gout flare, Left knee (previously R)  - continue allopurinol, s/p prednisone  - 6/15: with right wrist pain, left knee and left ankle pain possible 2/2 gout flare,  xray wrist with findings c/w OA        ========================INFECTIOUS DISEASE================  RUE Thrombophlebitis  - RUE US Duplex showing superficial thrombus  - s/p course of ancef    Febrile 6/7-8 overnight x1   - Temp 99.7 F  - UA neg, BCx on 6/7 NGTD x2, RVP neg   - follow fever curve, WBC   -CXR 6/17 with b/l patchy opacities c/w atelectasis, encourage incentive spirometer  66 year old man w/ PMHx HTN, HLD, type 2 DM, chronic HFrEF (EF 25-30% by Echo), complete heart block s/p PPM (in 2006, upgraded to BiV-ICD in 09/2016), CAD (s/p recent BERNABE mid LAD at [facility] on 04/18/2022), and stage II CKD (baseline Cr ~1.3) presented with near syncope to OSH found to have 41 episodes of VT on device, s/p unsuccessful VT ablation and transferred to [facility] for management of cardiogenic shock and advanced therapy eval.     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   - monitor hemodynamics and perfusion indices  - HF following, listed for OHT Status 2E        VTach   - s/p EPS on 5/24, unable to perform ablation as no substrate found and did not induce VT because of severely low EF   - Interrogation of ICD @Dr Wilson's office 5/20: back-to-back episodes of VT @ 200+ bpm all terminating with ATP. Since last cath pt has had 41 VT episodes (all falling into VF zone)  - Normal device function. BIV pacing 97%. No programming changes made  - c/w Amiodarone 200 QD  - c/w Toprol 25 daily  - No further VT on inotropic support  - keep K 4-4.5  - EP consult 6/15 for atrial tachycardia, prior VT as assessment of amiodarone; device interrogated, no recorded AT/AF or VT/VF. Per EP, no new interventions for slow NSVT. c/w amiodarone and toprol.       CAD   - Chest pain free and compliant with DAPT since last PCI 4/18/22 per discussion with interventional at OSH, ok to DC plavix pending cardiac surgery, last dose 5/28  - Cardiac cath @[facility] 4/18/22: mLAD 90% s/p BERNABE, LCx mild disease, RCA mild disease s/p diagnostic cardiac cath w/ Dr Wagner on 05/23/2022   - C/w ASA, d/c lipitor for elevated LFTs, pending transplant      ===================HEMATOLOGIC/ONC ===================  ?Secondary Polycythemia Vera   - elevated EPO, hgb in normal range  - Heme following peripherally  - MELINA-2 wnl    AC therapy   - Heparin gtt for IABP    ===================== RENAL =========================  JAGRUTI on CKD II  Admitted w/ Cr 2.01 (baseline Cr ~1.3), initial JAGRUTI 2/2 cardiogenic shock given RHC findings of CI 1.88. Improving  - 6/15 uptrending, likely 2/2 hypovolemia, improved s/p 250cc albumin  - Avoid nephrotoxic agents, NSAIDs. Renally dose meds.  - monitor strict I/Os and continue current cardiac support  - Continue monitoring urine output    #Hyponatremia  -Na downtrending, 124 today.   -unlikely iso hypervolemia, CVP 6/16 4. Either hypovolemic or euvolemic hyponatremia. Urine studies so far c/w possible SIADH vs poor Na intake. Cardiorenal c/s 6/17.     ==================== GASTROINTESTINAL===================  Elevated Liver Enzymes:  - elevated AST/ALT, normal bilirubin, alk phos. statin d/c 6/13. Downtrending  - Abd US on 6/14: Liver wnl, contracted gallbladder in the setting of recent meal. No acute cholecystitis.    Diet:   - Tolerating PO diet, Constant carb    Constipation:  - Cont. bowel regimen Miralax, Senna, PRN simethicone and bisacodyl   - s/p colonoscopy 6/3 with 5 polyps removed and biopsied, benign findings- GI recs for 3 yr follow up colo  - Protonix for GI prophylaxis      =======================    ENDOCRINE  =====================  T2DM (A1C 6.2 on admission)  - Cont. ISS, glucose controlled, monitor FS closely     Gout flare, Left knee (previously R)  - continue allopurinol, s/p prednisone  - 6/15: with right wrist pain, left knee and left ankle pain possible 2/2 gout flare,  xray wrist with findings c/w OA        ========================INFECTIOUS DISEASE================  RUE Thrombophlebitis  - RUE US Duplex showing superficial thrombus  - s/p course of ancef    Febrile 6/7-8 overnight x1   - Temp 99.7 F  - UA neg, BCx on 6/7 NGTD x2, RVP neg   - follow fever curve, WBC   -CXR 6/17 with b/l patchy opacities c/w atelectasis, encourage incentive spirometer     6/17: Patient's wife updated over the phone  66 year old man w/ PMHx HTN, HLD, type 2 DM, chronic HFrEF (EF 25-30% by Echo), complete heart block s/p PPM (in 2006, upgraded to BiV-ICD in 09/2016), CAD (s/p recent BERNABE mid LAD at [facility] on 04/18/2022), and stage II CKD (baseline Cr ~1.3) presented with near syncope to OSH found to have 41 episodes of VT on device, s/p unsuccessful VT ablation and transferred to [facility] for management of cardiogenic shock and advanced therapy eval.     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   - monitor hemodynamics and perfusion indices  - HF following, listed for OHT Status 2E        VTach   - s/p EPS on 5/24, unable to perform ablation as no substrate found and did not induce VT because of severely low EF   - Interrogation of ICD @Dr Wilson's office 5/20: back-to-back episodes of VT @ 200+ bpm all terminating with ATP. Since last cath pt has had 41 VT episodes (all falling into VF zone)  - Normal device function. BIV pacing 97%. No programming changes made  - c/w Amiodarone 200 QD  - c/w Toprol 25 daily  - No further VT on inotropic support  - keep K 4-4.5  - EP consult 6/15 for atrial tachycardia, prior VT as assessment of amiodarone; device interrogated, no recorded AT/AF or VT/VF. Per EP, no new interventions for slow NSVT. c/w amiodarone and toprol.       CAD   - Chest pain free and compliant with DAPT since last PCI 4/18/22 per discussion with interventional at OSH, ok to DC plavix pending cardiac surgery, last dose 5/28  - Cardiac cath @[facility] 4/18/22: mLAD 90% s/p BERNABE, LCx mild disease, RCA mild disease s/p diagnostic cardiac cath w/ Dr Wagner on 05/23/2022   - C/w ASA, d/c lipitor for elevated LFTs, pending transplant      ===================HEMATOLOGIC/ONC ===================  ?Secondary Polycythemia Vera   - elevated EPO, hgb in normal range  - as per heme low suspicion for PV  - MELINA-2 wnl    AC therapy   - Heparin gtt for IABP    ===================== RENAL =========================  JAGRUTI on CKD II  Admitted w/ Cr 2.01 (baseline Cr ~1.3), initial JAGRUTI 2/2 cardiogenic shock given RHC findings of CI 1.88. Improving  - 6/15 uptrending, likely 2/2 hypovolemia, improved s/p 250cc albumin  - Avoid nephrotoxic agents, NSAIDs. Renally dose meds.  - monitor strict I/Os and continue current cardiac support  - Continue monitoring urine output    #Hyponatremia  -Na downtrending, 124 today.   -unlikely iso hypervolemia, CVP 6/16 was 4. Either hypovolemic or euvolemic hyponatremia. Urine studies so far c/w possible SIADH vs poor Na intake. Cardiorenal c/s 6/17.     ==================== GASTROINTESTINAL===================  Elevated Liver Enzymes:  - elevated AST/ALT, normal bilirubin, alk phos. statin d/c 6/13. Downtrending  - Abd US on 6/14: Liver wnl, contracted gallbladder in the setting of recent meal. No acute cholecystitis.    Diet:   - Tolerating PO diet, Constant carb    Constipation:  - Cont. bowel regimen Miralax, Senna, PRN simethicone and bisacodyl   - s/p colonoscopy 6/3 with 5 polyps removed and biopsied, benign findings- GI recs for 3 yr follow up colo  - Protonix for GI prophylaxis      =======================    ENDOCRINE  =====================  T2DM (A1C 6.2 on admission)  - Cont. ISS, glucose controlled, monitor FS closely     Gout flare, Left knee (previously R)  - continue allopurinol, s/p prednisone  - 6/15: with right wrist pain, left knee and left ankle pain possible 2/2 gout flare,  xray wrist with findings c/w OA        ========================INFECTIOUS DISEASE================  RUE Thrombophlebitis  - RUE US Duplex showing superficial thrombus  - s/p course of ancef    Febrile 6/7-8 overnight x1   - Temp 99.7 F  - UA neg, BCx on 6/7 NGTD x2, RVP neg   - follow fever curve, WBC   -CXR 6/17 with b/l patchy opacities c/w atelectasis, encourage incentive spirometer     6/17: Patient's wife updated over the phone

## 2022-06-17 NOTE — PROGRESS NOTE ADULT - PROBLEM SELECTOR PLAN 4
- S/p PCI in April 2022  - Per interventional cardiology daniela Ansari to discontinue plavix (last dose 5/28)  - c/w ASA 81 mg daily

## 2022-06-17 NOTE — PROGRESS NOTE ADULT - PROBLEM SELECTOR PLAN 3
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 Nipride study 5/29 with reduction in PVR to < 2 though still with elevated PA pressures   - Improved with MCS unloading  - Nipride as above

## 2022-06-17 NOTE — PROGRESS NOTE ADULT - CRITICAL CARE ATTENDING COMMENT
stable overnight. no albumin but oral hydration encouraged.   meds; milrinone .5, nipride 2.5, heparin (43), amnio 200, HDZN 100 tid, ISDN 40 tid, toprol XL 25, asa  HR SR , aug MAPs 70s, aug diast , afebrile.   I/O; -2.5  urine osmolarity 249 (300-900)  serum osmolarity 273 (280-301)   06-17  128<L>  |  98  |  23  ----------------------------<  151<H>  4.4   |  20<L>  |  1.24  Ca    9.1      17 Jun 2022 04:39  Phos  3.2     06-17  Mg     2.0     06-17  TPro  6.6  /  Alb  3.3  /  TBili  0.4  /  DBili  x   /  AST  30  /  ALT  45  /  AlkPhos  81  06-17                        16.3   10.17 )-----------( 180      ( 17 Jun 2022 04:43 )             48.2   Imp:  WBC stable. Na improved. Cr improved  note serum/urine osmolarity- would ask cardiorenal to comment on cause of hyponatremia.   in the meanwhile would encourage oral intake as fluid resuscitation has improved renal function   remains UNOS IIe, ABO A, PRA 0  Dany Dominique

## 2022-06-17 NOTE — CHART NOTE - NSCHARTNOTEFT_GEN_A_CORE
HEMATOLOGY FELLOW NOTE    Patient is s/p RHC+IABP on 6/6. H/H normal. EPO level elevated, suggestive of secondary polycythemia. MELINA 2 results reviewed--no mutation in exon 12-15. Concern for primary PCV is low.     Hematology will sign off.     Please page or call with questions.     Valarie Moseley MD  Hematology/Oncology Fellow, PGY-5  Pager: [phone]  After 5pm and on weekends please page on-call fellow

## 2022-06-17 NOTE — PROGRESS NOTE ADULT - SUBJECTIVE AND OBJECTIVE BOX
Authored by Sumi Pantoja MD, PGY2  PATIENT:  LENNOX GOCOOL  [id number]    CHIEF COMPLAINT:  Patient is a 66y old  Male who presents with a chief complaint of LVAD/OHT eval (2022 21:49)      INTERVAL HISTORY OVERNIGHT EVENTS: DEON overnight.         MEDICATIONS:  MEDICATIONS  (STANDING):  allopurinol 100 milliGRAM(s) Oral daily  aMIOdarone    Tablet 200 milliGRAM(s) Oral daily  aspirin enteric coated 81 milliGRAM(s) Oral daily  chlorhexidine 2% Cloths 1 Application(s) Topical daily  chlorhexidine 4% Liquid 1 Application(s) Topical <User Schedule>  heparin  Infusion 1200 Unit(s)/Hr (17 mL/Hr) IV Continuous <Continuous>  hydrALAZINE 100 milliGRAM(s) Oral every 8 hours  insulin lispro (ADMELOG) corrective regimen sliding scale   SubCutaneous at bedtime  insulin lispro (ADMELOG) corrective regimen sliding scale   SubCutaneous three times a day before meals  isosorbide   dinitrate Tablet (ISORDIL) 40 milliGRAM(s) Oral three times a day  lidocaine   4% Patch 1 Patch Transdermal daily  metoprolol succinate ER 25 milliGRAM(s) Oral daily  milrinone Infusion 0.5 MICROgram(s)/kG/Min (11 mL/Hr) IV Continuous <Continuous>  nitroprusside Infusion 0.25 MICROgram(s)/kG/Min (2.73 mL/Hr) IV Continuous <Continuous>  pantoprazole    Tablet 40 milliGRAM(s) Oral before breakfast  polyethylene glycol 3350 17 Gram(s) Oral daily  senna 1 Tablet(s) Oral at bedtime    MEDICATIONS  (PRN):  acetaminophen     Tablet .. 650 milliGRAM(s) Oral every 6 hours PRN Temp greater or equal to 38C (100.4F), Mild Pain (1 - 3)  bisacodyl Suppository 10 milliGRAM(s) Rectal daily PRN Constipation  simethicone 80 milliGRAM(s) Chew every 6 hours PRN Gas  sodium chloride 0.65% Nasal 1 Spray(s) Both Nostrils every 6 hours PRN Nasal Congestion      ALLERGIES:  Allergies    No Known Allergies    Intolerances        OBJECTIVE:  ICU Vital Signs Last 24 Hrs  T(C): 37.3 (2022 04:00), Max: 37.4 (2022 17:00)  T(F): 99.1 (2022 04:00), Max: 99.4 (2022 17:00)  HR: 82 (2022 07:30) (80 - 108)  BP: --  BP(mean): --  ABP: --  ABP(mean): --  RR: 22 (2022 07:30) (15 - 31)  SpO2: 92% (2022 07:30) (90% - 97%)        CAPILLARY BLOOD GLUCOSE      POCT Blood Glucose.: 126 mg/dL (2022 21:31)  POCT Blood Glucose.: 130 mg/dL (2022 17:05)  POCT Blood Glucose.: 129 mg/dL (2022 11:39)  POCT Blood Glucose.: 137 mg/dL (2022 08:12)    CAPILLARY BLOOD GLUCOSE      POCT Blood Glucose.: 126 mg/dL (2022 21:31)    I&O's Summary    2022 07:01  -  2022 07:00  --------------------------------------------------------  IN: 1739.8 mL / OUT: 4275 mL / NET: -2535.2 mL      Daily     Daily Weight in k.4 (2022 04:00)    PHYSICAL EXAMINATION:  Appearance: Normal, NAD  HEAD:  Normocephalic  EYES:  EOMI, conjunctiva and sclera clear  NECK: Supple, No JVD  CHEST/LUNG: Clear to auscultation bilaterally; No wheezing  HEART: Normal S1, S2. No rubs, or gallops  ABDOMEN: + Bowel sounds, Soft, NT, ND   EXTREMITIES:  2+ Peripheral Pulses, No clubbing, cyanosis, or edema. b/l Knees equal in temperature, no effusions noted  NEUROLOGY: non-focal, aaox3  SKIN: No rashes or lesions        LABS:                          16.3   10.17 )-----------( 180      ( 2022 04:43 )             48.2     06-17    128<L>  |  98  |  23  ----------------------------<  151<H>  4.4   |  20<L>  |  1.24    Ca    9.1      2022 04:39  Phos  3.2       Mg     2.0         TPro  6.6  /  Alb  3.3  /  TBili  0.4  /  DBili  x   /  AST  30  /  ALT  45  /  AlkPhos  81  -    LIVER FUNCTIONS - ( 2022 04:39 )  Alb: 3.3 g/dL / Pro: 6.6 g/dL / ALK PHOS: 81 U/L / ALT: 45 U/L / AST: 30 U/L / GGT: x           PT/INR - ( 2022 04:43 )   PT: 13.2 sec;   INR: 1.15 ratio         PTT - ( 2022 04:43 )  PTT:43.8 sec            TELEMETRY:     EKG:     IMAGING:    < from: Xray Chest 1 View- PORTABLE-Routine (Xray Chest 1 View- PORTABLE-Routine in AM.) (22 @ 05:27) >  TECHNIQUE:   AP Portable chest x-ray.    INTERPRETATION:    Heart size and the mediastinum cannot be accurately evaluated on this   projection.  There is a left chest wall biventricular pacemaker ICD in place. The   generator obscures part of the left lung.  IABP marker is approximately 1.4 cm below the superior aspect of the   aortic knob.  There or mild patchy bilateral lower lung opacities. There is linear   atelectasis in the right mid and left lower lung.  No pleural effusion or pneumothorax is seen.      IMPRESSION:  IABP marker is approximately 1.4 cm below the superior   aspect of the aortic knob. Suggest pulling back the IABP. Discussed with   MITRA Simon with read back at 4:52 PM on 2022 by Dr. Tellez.    Mild bilateral patchy lower lung opacities which could be due to   subsegmental atelectasis. Developing infection is not excluded in the   appropriate clinical context.    Right mid and left lower lung linear atelectasis.    --- End of Report ---    < end of copied text >     Authored by Sumi Pantoja MD, PGY2  PATIENT:  LENNOX GOCOOL  [id number]    CHIEF COMPLAINT:  Patient is a 66y old  Male who presents with a chief complaint of LVAD/OHT eval (2022 21:49)      INTERVAL HISTORY OVERNIGHT EVENTS: DEON overnight. Patient with left knee pain, improving with tylenol. Patient with good appetite, eating and drinking well.         MEDICATIONS:  MEDICATIONS  (STANDING):  allopurinol 100 milliGRAM(s) Oral daily  aMIOdarone    Tablet 200 milliGRAM(s) Oral daily  aspirin enteric coated 81 milliGRAM(s) Oral daily  chlorhexidine 2% Cloths 1 Application(s) Topical daily  chlorhexidine 4% Liquid 1 Application(s) Topical <User Schedule>  heparin  Infusion 1200 Unit(s)/Hr (17 mL/Hr) IV Continuous <Continuous>  hydrALAZINE 100 milliGRAM(s) Oral every 8 hours  insulin lispro (ADMELOG) corrective regimen sliding scale   SubCutaneous at bedtime  insulin lispro (ADMELOG) corrective regimen sliding scale   SubCutaneous three times a day before meals  isosorbide   dinitrate Tablet (ISORDIL) 40 milliGRAM(s) Oral three times a day  lidocaine   4% Patch 1 Patch Transdermal daily  metoprolol succinate ER 25 milliGRAM(s) Oral daily  milrinone Infusion 0.5 MICROgram(s)/kG/Min (11 mL/Hr) IV Continuous <Continuous>  nitroprusside Infusion 0.25 MICROgram(s)/kG/Min (2.73 mL/Hr) IV Continuous <Continuous>  pantoprazole    Tablet 40 milliGRAM(s) Oral before breakfast  polyethylene glycol 3350 17 Gram(s) Oral daily  senna 1 Tablet(s) Oral at bedtime    MEDICATIONS  (PRN):  acetaminophen     Tablet .. 650 milliGRAM(s) Oral every 6 hours PRN Temp greater or equal to 38C (100.4F), Mild Pain (1 - 3)  bisacodyl Suppository 10 milliGRAM(s) Rectal daily PRN Constipation  simethicone 80 milliGRAM(s) Chew every 6 hours PRN Gas  sodium chloride 0.65% Nasal 1 Spray(s) Both Nostrils every 6 hours PRN Nasal Congestion      ALLERGIES:  Allergies    No Known Allergies    Intolerances        OBJECTIVE:  ICU Vital Signs Last 24 Hrs  T(C): 37.3 (2022 04:00), Max: 37.4 (2022 17:00)  T(F): 99.1 (2022 04:00), Max: 99.4 (2022 17:00)  HR: 82 (2022 07:30) (80 - 108)  BP: --  BP(mean): --  ABP: --  ABP(mean): --  RR: 22 (2022 07:30) (15 - 31)  SpO2: 92% (2022 07:30) (90% - 97%)        CAPILLARY BLOOD GLUCOSE      POCT Blood Glucose.: 126 mg/dL (2022 21:31)  POCT Blood Glucose.: 130 mg/dL (2022 17:05)  POCT Blood Glucose.: 129 mg/dL (2022 11:39)  POCT Blood Glucose.: 137 mg/dL (2022 08:12)    CAPILLARY BLOOD GLUCOSE      POCT Blood Glucose.: 126 mg/dL (2022 21:31)    I&O's Summary    2022 07:01  -  2022 07:00  --------------------------------------------------------  IN: 1739.8 mL / OUT: 4275 mL / NET: -2535.2 mL      Daily     Daily Weight in k.4 (2022 04:00)    PHYSICAL EXAMINATION:  Appearance: Normal, NAD  HEAD:  Normocephalic  EYES:  EOMI, conjunctiva and sclera clear  NECK: Supple, No JVD  CHEST/LUNG: Clear to auscultation bilaterally; No wheezing  HEART: Normal S1, S2. No rubs, or gallops  ABDOMEN: + Bowel sounds, Soft, NT, ND   EXTREMITIES:  2+ Peripheral Pulses, No clubbing, cyanosis, or edema. b/l Knees equal in temperature, no effusions noted  NEUROLOGY: non-focal, aaox3  SKIN: No rashes or lesions        LABS:                          16.3   10.17 )-----------( 180      ( 2022 04:43 )             48.2     06-17    128<L>  |  98  |  23  ----------------------------<  151<H>  4.4   |  20<L>  |  1.24    Ca    9.1      2022 04:39  Phos  3.2       Mg     2.0         TPro  6.6  /  Alb  3.3  /  TBili  0.4  /  DBili  x   /  AST  30  /  ALT  45  /  AlkPhos  81  -17    LIVER FUNCTIONS - ( 2022 04:39 )  Alb: 3.3 g/dL / Pro: 6.6 g/dL / ALK PHOS: 81 U/L / ALT: 45 U/L / AST: 30 U/L / GGT: x           PT/INR - ( 2022 04:43 )   PT: 13.2 sec;   INR: 1.15 ratio         PTT - ( 2022 04:43 )  PTT:43.8 sec            TELEMETRY:     EKG:     IMAGING:    < from: Xray Chest 1 View- PORTABLE-Routine (Xray Chest 1 View- PORTABLE-Routine in AM.) (22 @ 05:27) >  TECHNIQUE:   AP Portable chest x-ray.    INTERPRETATION:    Heart size and the mediastinum cannot be accurately evaluated on this   projection.  There is a left chest wall biventricular pacemaker ICD in place. The   generator obscures part of the left lung.  IABP marker is approximately 1.4 cm below the superior aspect of the   aortic knob.  There or mild patchy bilateral lower lung opacities. There is linear   atelectasis in the right mid and left lower lung.  No pleural effusion or pneumothorax is seen.      IMPRESSION:  IABP marker is approximately 1.4 cm below the superior   aspect of the aortic knob. Suggest pulling back the IABP. Discussed with   MITRA Simon with read back at 4:52 PM on 2022 by Dr. Tellez.    Mild bilateral patchy lower lung opacities which could be due to   subsegmental atelectasis. Developing infection is not excluded in the   appropriate clinical context.    Right mid and left lower lung linear atelectasis.    --- End of Report ---    < end of copied text >

## 2022-06-17 NOTE — CONSULT NOTE ADULT - SUBJECTIVE AND OBJECTIVE BOX
[facility] DIVISION OF KIDNEY DISEASES AND HYPERTENSION -- [phone]  -- INITIAL CONSULT NOTE  --------------------------------------------------------------------------------  HPI: 66 year old man w/ PMHx HTN, HLD, type 2 DM, chronic HFrEF,  (EF 25-30% by Echo), complete heart block s/p PPM (in 2006, upgraded to BiV-ICD in 09/2016), CAD (s/p recent BERNABE mid LAD at [facility] on 04/18/2022), being evaluated for OHT. Pt currently in CCU and has IABP. Nephrology consulted for hyponatremia.     Upon review of labs, SNa was WNL on admission. SNa started downtrending since 5/25 and remained low in 130s. SNa later improved and remained WNL on in beginning of June. SNa decreased to 124 on 6/16 and again improved 128 today AM. Last SNa improved to 130 today. Urine Na of 33 and serum osmolality of 273.     Pt. seen and examined in CICU, reports feeling better.     PAST HISTORY  --------------------------------------------------------------------------------  PAST MEDICAL & SURGICAL HISTORY:  AV block      Essential hypertension      Chronic HFrEF (heart failure with reduced ejection fraction)      Chronic kidney disease, unspecified CKD stage      CAD (coronary artery disease)      HLD (hyperlipidemia)      Type 2 diabetes mellitus      Artificial cardiac pacemaker        FAMILY HISTORY:  Family history of acute myocardial infarction (Father)  72      PAST SOCIAL HISTORY:    ALLERGIES & MEDICATIONS  --------------------------------------------------------------------------------  Allergies    No Known Allergies    Intolerances      Standing Inpatient Medications  allopurinol 100 milliGRAM(s) Oral daily  aMIOdarone    Tablet 200 milliGRAM(s) Oral daily  aspirin enteric coated 81 milliGRAM(s) Oral daily  chlorhexidine 2% Cloths 1 Application(s) Topical daily  chlorhexidine 4% Liquid 1 Application(s) Topical <User Schedule>  heparin  Infusion 1200 Unit(s)/Hr IV Continuous <Continuous>  hydrALAZINE 100 milliGRAM(s) Oral every 8 hours  insulin lispro (ADMELOG) corrective regimen sliding scale   SubCutaneous at bedtime  insulin lispro (ADMELOG) corrective regimen sliding scale   SubCutaneous three times a day before meals  isosorbide   dinitrate Tablet (ISORDIL) 40 milliGRAM(s) Oral three times a day  metoprolol succinate ER 25 milliGRAM(s) Oral daily  milrinone Infusion 0.5 MICROgram(s)/kG/Min IV Continuous <Continuous>  nitroprusside Infusion 0.25 MICROgram(s)/kG/Min IV Continuous <Continuous>  pantoprazole    Tablet 40 milliGRAM(s) Oral before breakfast  polyethylene glycol 3350 17 Gram(s) Oral daily  senna 1 Tablet(s) Oral at bedtime    PRN Inpatient Medications  acetaminophen     Tablet .. 650 milliGRAM(s) Oral every 6 hours PRN  bisacodyl Suppository 10 milliGRAM(s) Rectal daily PRN  simethicone 80 milliGRAM(s) Chew every 6 hours PRN  sodium chloride 0.65% Nasal 1 Spray(s) Both Nostrils every 6 hours PRN      REVIEW OF SYSTEMS  --------------------------------------------------------------------------------  Gen: No fevers/chills  Head/Eyes/Ears: Normal hearing,   Respiratory: No dyspnea, cough  CV: No chest pain, as per HPI  GI: No abdominal pain, diarrhea  : No dysuria, hematuria  MSK: No  edema  Skin: No rashes  Heme: No easy bruising or bleeding    All other systems were reviewed and are negative, except as noted.    VITALS/PHYSICAL EXAM  --------------------------------------------------------------------------------  T(C): 37.1 (06-17-22 @ 11:00), Max: 37.4 (06-16-22 @ 17:00)  HR: 80 (06-17-22 @ 14:00) (76 - 116)  BP: --  RR: 20 (06-17-22 @ 14:00) (15 - 31)  SpO2: 91% (06-17-22 @ 14:00) (90% - 96%)  Wt(kg): --      06-16-22 @ 07:01  -  06-17-22 @ 07:00  --------------------------------------------------------  IN: 1767.1 mL / OUT: 4275 mL / NET: -2507.9 mL    06-17-22 @ 07:01  -  06-17-22 @ 15:35  --------------------------------------------------------  IN: 670.8 mL / OUT: 1200 mL / NET: -529.2 mL    Physical Exam:  	Gen: NAD  	HEENT: Anicteric  	Pulm: CTA B/L  	CV: S1S2+  	Abd: Soft, +BS   	Ext: No LE edema B/L  	Neuro: Awake  	Skin: Warm and dry    LABS/STUDIES  --------------------------------------------------------------------------------              13.2   12.92 >-----------<  317      [06-17-22 @ 13:38]              40.3     130  |  98  |  20  ----------------------------<  125      [06-17-22 @ 13:38]  4.4   |  19  |  1.09        Ca     9.7     [06-17-22 @ 13:38]      Mg     2.0     [06-17-22 @ 13:38]      Phos  3.3     [06-17-22 @ 13:38]    Uric acid 4.3      [06-16-22 @ 13:41]  Serum Osmolality 273      [06-16-22 @ 13:41]    Creatinine Trend:  SCr 1.09 [06-17 @ 13:38]  SCr 1.24 [06-17 @ 04:39]  SCr 1.21 [06-16 @ 04:20]  SCr 1.27 [06-15 @ 17:09]  SCr 1.44 [06-15 @ 04:38]    Urinalysis - [06-08-22 @ 00:26]      Color Light Yellow / Appearance Clear / SG 1.014 / pH 6.0      Gluc Negative / Ketone Negative  / Bili Negative / Urobili Negative       Blood Trace / Protein Trace / Leuk Est Negative / Nitrite Negative      RBC 1 / WBC 0 / Hyaline 0 / Gran  / Sq Epi  / Non Sq Epi 0 / Bacteria Negative    Urine Sodium 33      [06-16-22 @ 14:15]  Urine Potassium 15      [06-16-22 @ 14:15]  Urine Osmolality 249      [06-16-22 @ 14:14]    HBsAb Nonreact      [05-27-22 @ 17:23]  HBsAg Nonreact      [05-27-22 @ 17:23]  HBcAb Nonreact      [05-27-22 @ 17:23]  HCV 0.04, Nonreact      [04-19-22 @ 07:30]  HIV Nonreact      [05-27-22 @ 15:19]    MELIZA: titer 1:320, pattern Homogeneous      [05-27-22 @ 17:18]  Rheumatoid Factor 13      [05-27-22 @ 17:02]  Syphilis Screen (Treponema Pallidum Ab) Negative      [06-02-22 @ 21:12]  Free Light Chains: kappa 3.64, lambda 2.71, ratio = 1.34      [05-27 @ 17:20]  Immunofixation Serum: No Monoclonal Band Identified    Reference Range: None Detected      [05-27-22 @ 17:20]  SPEP Interpretation: Normal Electrophoresis Pattern      [05-27-22 @ 17:20]  Immunofixation Urine:   Reference Range: None Detected      [06-05-22 @ 12:15]  UPEP Interpretation: Normal Electrophoresis Pattern      [06-05-22 @ 12:15]

## 2022-06-17 NOTE — PROGRESS NOTE ADULT - PROBLEM SELECTOR PLAN 5
- hx of VT s/p unsuccessful VT ablation  - continue on amio 200 mg PO QD  - since being admitted to [facility] has not had any episodes of VT, currently tolerating high dose milrinone.  -paced at 80 on telemetry with few PVCs

## 2022-06-17 NOTE — PROGRESS NOTE ADULT - SUBJECTIVE AND OBJECTIVE BOX
Subjective:      Medications:  acetaminophen     Tablet .. 650 milliGRAM(s) Oral every 6 hours PRN  allopurinol 100 milliGRAM(s) Oral daily  aMIOdarone    Tablet 200 milliGRAM(s) Oral daily  aspirin enteric coated 81 milliGRAM(s) Oral daily  bisacodyl Suppository 10 milliGRAM(s) Rectal daily PRN  chlorhexidine 2% Cloths 1 Application(s) Topical daily  chlorhexidine 4% Liquid 1 Application(s) Topical <User Schedule>  heparin  Infusion 1200 Unit(s)/Hr IV Continuous <Continuous>  hydrALAZINE 100 milliGRAM(s) Oral every 8 hours  insulin lispro (ADMELOG) corrective regimen sliding scale   SubCutaneous at bedtime  insulin lispro (ADMELOG) corrective regimen sliding scale   SubCutaneous three times a day before meals  isosorbide   dinitrate Tablet (ISORDIL) 40 milliGRAM(s) Oral three times a day  lidocaine   4% Patch 1 Patch Transdermal daily  metoprolol succinate ER 25 milliGRAM(s) Oral daily  milrinone Infusion 0.5 MICROgram(s)/kG/Min IV Continuous <Continuous>  nitroprusside Infusion 0.25 MICROgram(s)/kG/Min IV Continuous <Continuous>  pantoprazole    Tablet 40 milliGRAM(s) Oral before breakfast  polyethylene glycol 3350 17 Gram(s) Oral daily  senna 1 Tablet(s) Oral at bedtime  simethicone 80 milliGRAM(s) Chew every 6 hours PRN  sodium chloride 0.65% Nasal 1 Spray(s) Both Nostrils every 6 hours PRN      ICU Vital Signs Last 24 Hrs  T(C): 36.7, Max: 37.4 (16 @ 17:00)  HR: 104 (80 - 108)  BP: --  BP(mean): --  ABP: --  ABP(mean): --  RR: 18 (15 - 31)  SpO2: 93% (90% - 96%)    Weight in k.4 (-)  Weight in k.6 (06-15-)      I&O's Summary Last 24 Hrs    IN: 1767.1 mL / OUT: 4275 mL / NET: -2507.9 mL      Tele:    Physical Exam:    General: No distress. Comfortable.  HEENT: EOM intact.  Neck: JVP not elevated.  Respiratory: Clear to auscultation bilaterally  CV: RRR. Normal S1 and S2. No murmurs, rub, or gallops. Radial pulses normal.  Abdomen: Soft, non-distended, non-tender  Skin: No skin lesions  Extremities: Warm, no edema  Neurology: Non-focal, alert and oriented times three.   Psych: Affect normal    Labs:    ( 22 @ 04:43 )               16.3   10.17 )--------( 180                  48.2     ( 22 @ 04:39 )     128  |  98  |  23  ---------------------<  151  4.4  |  20  |  1.24    Ca 9.1  Phos 3.2  Mg 2.0    ( 22 @ 04:39 )  TPro  6.6  /  Alb  3.3  /  TBili  0.4  /  DBili  x   /  AST  30  /  ALT  45  /  AlkPhos  81  ( 22 @ 04:20 )  TPro  6.5  /  Alb  3.2  /  TBili  0.6  /  DBili  x   /  AST  49  /  ALT  48  /  AlkPhos  77    PTT/PT/INR - ( 22 @ 04:43 )  PTT: 43.8 sec / PT: 13.2 sec / INR: 1.15 ratio    ( 22 @ 20:22 )  TropHS 93    /    / CKMB x      ( 22 @ 16:53 )  TropHS x     /    / CKMB x        Serum Pro-Brain Natriuretic Peptide: 3102 (22 @ 15:23)          Lactate, Blood: 1.3 (22 @ 04:43)     Subjective:  - NAEO  - resting comfortably in bed with no complaints    Medications:  acetaminophen     Tablet .. 650 milliGRAM(s) Oral every 6 hours PRN  allopurinol 100 milliGRAM(s) Oral daily  aMIOdarone    Tablet 200 milliGRAM(s) Oral daily  aspirin enteric coated 81 milliGRAM(s) Oral daily  bisacodyl Suppository 10 milliGRAM(s) Rectal daily PRN  chlorhexidine 2% Cloths 1 Application(s) Topical daily  chlorhexidine 4% Liquid 1 Application(s) Topical <User Schedule>  heparin  Infusion 1200 Unit(s)/Hr IV Continuous <Continuous>  hydrALAZINE 100 milliGRAM(s) Oral every 8 hours  insulin lispro (ADMELOG) corrective regimen sliding scale   SubCutaneous at bedtime  insulin lispro (ADMELOG) corrective regimen sliding scale   SubCutaneous three times a day before meals  isosorbide   dinitrate Tablet (ISORDIL) 40 milliGRAM(s) Oral three times a day  lidocaine   4% Patch 1 Patch Transdermal daily  metoprolol succinate ER 25 milliGRAM(s) Oral daily  milrinone Infusion 0.5 MICROgram(s)/kG/Min IV Continuous <Continuous>  nitroprusside Infusion 0.25 MICROgram(s)/kG/Min IV Continuous <Continuous>  pantoprazole    Tablet 40 milliGRAM(s) Oral before breakfast  polyethylene glycol 3350 17 Gram(s) Oral daily  senna 1 Tablet(s) Oral at bedtime  simethicone 80 milliGRAM(s) Chew every 6 hours PRN  sodium chloride 0.65% Nasal 1 Spray(s) Both Nostrils every 6 hours PRN      ICU Vital Signs Last 24 Hrs  T(C): 36.7, Max: 37.4 (16 @ 17:00)  HR: 104 (80 - 108)  BP: --  BP(mean): --  ABP: --  ABP(mean): --  RR: 18 (15 - 31)  SpO2: 93% (90% - 96%)    Weight in k.4 (-)  Weight in k.6 (06-15-22)      I&O's Summary Last 24 Hrs    IN: 1767.1 mL / OUT: 4275 mL / NET: -2507.9 mL      Tele: SR/paced 80-100s    Physical Exam:    General: No distress. Comfortable.  HEENT: EOM intact.  Neck: JVP not elevated.  Respiratory: Clear to auscultation bilaterally  CV: RRR. Normal S1 and S2. No murmurs, rub, or gallops. Radial + DP pulses normal.  Abdomen: Soft, non-distended, non-tender  Skin: No skin lesions  Extremities: Warm, no edema  Neurology: Non-focal, alert and oriented times three.   Psych: Affect normal    Labs:    ( 22 @ 04:43 )               16.3   10.17 )--------( 180                  48.2     ( 22 @ 04:39 )     128  |  98  |  23  ---------------------<  151  4.4  |  20  |  1.24    Ca 9.1  Phos 3.2  Mg 2.0    ( 22 @ 04:39 )  TPro  6.6  /  Alb  3.3  /  TBili  0.4  /  DBili  x   /  AST  30  /  ALT  45  /  AlkPhos  81  ( 22 @ 04:20 )  TPro  6.5  /  Alb  3.2  /  TBili  0.6  /  DBili  x   /  AST  49  /  ALT  48  /  AlkPhos  77    PTT/PT/INR - ( 22 @ 04:43 )  PTT: 43.8 sec / PT: 13.2 sec / INR: 1.15 ratio    ( 22 @ 20:22 )  TropHS 93    /    / CKMB x      ( 22 @ 16:53 )  TropHS x     /    / CKMB x        Serum Pro-Brain Natriuretic Peptide: 3102 (22 @ 15:23)    Lactate, Blood: 1.3 (22 @ 04:43)

## 2022-06-17 NOTE — PROGRESS NOTE ADULT - PROBLEM SELECTOR PLAN 6
6/8 SCr 1.12, 6/9 1.29   - upon arrival was noted to have JAGRUTI, likely related to low cardiac output  - mild rise in Cr earlier this week in setting of hypovolemia, now normalized with albumin/increase PO fluid intake

## 2022-06-17 NOTE — PROGRESS NOTE ADULT - PROBLEM SELECTOR PLAN 1
- continue IABP 1:1  - continue milrinone 0.5 mcg/kg/min  - continue HDZN 100 mg TID and ISDN 40 mg TID for afterload reduction  - titrate nipride for assisted means 70-80 on IABP

## 2022-06-17 NOTE — CONSULT NOTE ADULT - PROBLEM SELECTOR RECOMMENDATION 9
Pt with hyponatremia with unclear etiology. Upon review of labs, SNa was WNL on admission. SNa started downtrending since 5/25 and remained low in 130s. SNa later improved and remained WNL on in beginning of June. SNa decreased to 124 on 6/16 and again improved 128 today AM. Last SNa improved to 130 today. Urine Na of 33 and serum osmolality of 273. Pt with robust UOP ~4L without diuretics. Continue present care. Avoid hypotonic solution. Avoid overcorrection of more than 6-8 meq in 24 hours. Monitor SNa.

## 2022-06-18 LAB
ALBUMIN SERPL ELPH-MCNC: 3 G/DL — LOW (ref 3.3–5)
ALP SERPL-CCNC: 85 U/L — SIGNIFICANT CHANGE UP (ref 40–120)
ALT FLD-CCNC: 56 U/L — HIGH (ref 10–45)
ANION GAP SERPL CALC-SCNC: 12 MMOL/L — SIGNIFICANT CHANGE UP (ref 5–17)
APTT BLD: 67.5 SEC — HIGH (ref 27.5–35.5)
APTT BLD: 74.2 SEC — HIGH (ref 27.5–35.5)
APTT BLD: 79.4 SEC — HIGH (ref 27.5–35.5)
AST SERPL-CCNC: 38 U/L — SIGNIFICANT CHANGE UP (ref 10–40)
BASOPHILS # BLD AUTO: 0.15 K/UL — SIGNIFICANT CHANGE UP (ref 0–0.2)
BASOPHILS NFR BLD AUTO: 1.2 % — SIGNIFICANT CHANGE UP (ref 0–2)
BILIRUB SERPL-MCNC: 0.4 MG/DL — SIGNIFICANT CHANGE UP (ref 0.2–1.2)
BUN SERPL-MCNC: 22 MG/DL — SIGNIFICANT CHANGE UP (ref 7–23)
CALCIUM SERPL-MCNC: 9.4 MG/DL — SIGNIFICANT CHANGE UP (ref 8.4–10.5)
CHLORIDE SERPL-SCNC: 99 MMOL/L — SIGNIFICANT CHANGE UP (ref 96–108)
CO2 SERPL-SCNC: 18 MMOL/L — LOW (ref 22–31)
CREAT SERPL-MCNC: 1.18 MG/DL — SIGNIFICANT CHANGE UP (ref 0.5–1.3)
EGFR: 69 ML/MIN/1.73M2 — SIGNIFICANT CHANGE UP
EOSINOPHIL # BLD AUTO: 0.99 K/UL — HIGH (ref 0–0.5)
EOSINOPHIL NFR BLD AUTO: 7.9 % — HIGH (ref 0–6)
GLUCOSE BLDC GLUCOMTR-MCNC: 121 MG/DL — HIGH (ref 70–99)
GLUCOSE BLDC GLUCOMTR-MCNC: 123 MG/DL — HIGH (ref 70–99)
GLUCOSE BLDC GLUCOMTR-MCNC: 124 MG/DL — HIGH (ref 70–99)
GLUCOSE BLDC GLUCOMTR-MCNC: 137 MG/DL — HIGH (ref 70–99)
GLUCOSE SERPL-MCNC: 124 MG/DL — HIGH (ref 70–99)
HCT VFR BLD CALC: 39.3 % — SIGNIFICANT CHANGE UP (ref 39–50)
HGB BLD-MCNC: 12.9 G/DL — LOW (ref 13–17)
IMM GRANULOCYTES NFR BLD AUTO: 2.2 % — HIGH (ref 0–1.5)
LYMPHOCYTES # BLD AUTO: 2.95 K/UL — SIGNIFICANT CHANGE UP (ref 1–3.3)
LYMPHOCYTES # BLD AUTO: 23.7 % — SIGNIFICANT CHANGE UP (ref 13–44)
MAGNESIUM SERPL-MCNC: 2 MG/DL — SIGNIFICANT CHANGE UP (ref 1.6–2.6)
MCHC RBC-ENTMCNC: 29.5 PG — SIGNIFICANT CHANGE UP (ref 27–34)
MCHC RBC-ENTMCNC: 32.8 GM/DL — SIGNIFICANT CHANGE UP (ref 32–36)
MCV RBC AUTO: 89.7 FL — SIGNIFICANT CHANGE UP (ref 80–100)
MONOCYTES # BLD AUTO: 1.2 K/UL — HIGH (ref 0–0.9)
MONOCYTES NFR BLD AUTO: 9.6 % — SIGNIFICANT CHANGE UP (ref 2–14)
NEUTROPHILS # BLD AUTO: 6.9 K/UL — SIGNIFICANT CHANGE UP (ref 1.8–7.4)
NEUTROPHILS NFR BLD AUTO: 55.4 % — SIGNIFICANT CHANGE UP (ref 43–77)
NRBC # BLD: 0 /100 WBCS — SIGNIFICANT CHANGE UP (ref 0–0)
PHOSPHATE SERPL-MCNC: 3.7 MG/DL — SIGNIFICANT CHANGE UP (ref 2.5–4.5)
PLATELET # BLD AUTO: 338 K/UL — SIGNIFICANT CHANGE UP (ref 150–400)
POTASSIUM SERPL-MCNC: 4.6 MMOL/L — SIGNIFICANT CHANGE UP (ref 3.5–5.3)
POTASSIUM SERPL-SCNC: 4.6 MMOL/L — SIGNIFICANT CHANGE UP (ref 3.5–5.3)
PROT SERPL-MCNC: 6.9 G/DL — SIGNIFICANT CHANGE UP (ref 6–8.3)
RBC # BLD: 4.38 M/UL — SIGNIFICANT CHANGE UP (ref 4.2–5.8)
RBC # FLD: 16 % — HIGH (ref 10.3–14.5)
SODIUM SERPL-SCNC: 129 MMOL/L — LOW (ref 135–145)
WBC # BLD: 12.47 K/UL — HIGH (ref 3.8–10.5)
WBC # FLD AUTO: 12.47 K/UL — HIGH (ref 3.8–10.5)

## 2022-06-18 PROCEDURE — 71045 X-RAY EXAM CHEST 1 VIEW: CPT | Mod: 26

## 2022-06-18 PROCEDURE — 99291 CRITICAL CARE FIRST HOUR: CPT

## 2022-06-18 PROCEDURE — 99233 SBSQ HOSP IP/OBS HIGH 50: CPT

## 2022-06-18 PROCEDURE — 99292 CRITICAL CARE ADDL 30 MIN: CPT

## 2022-06-18 RX ORDER — ACETAMINOPHEN 500 MG
1000 TABLET ORAL ONCE
Refills: 0 | Status: COMPLETED | OUTPATIENT
Start: 2022-06-18 | End: 2022-06-18

## 2022-06-18 RX ADMIN — CHLORHEXIDINE GLUCONATE 1 APPLICATION(S): 213 SOLUTION TOPICAL at 21:18

## 2022-06-18 RX ADMIN — Medication 400 MILLIGRAM(S): at 22:53

## 2022-06-18 RX ADMIN — Medication 1000 MILLIGRAM(S): at 23:11

## 2022-06-18 RX ADMIN — PANTOPRAZOLE SODIUM 40 MILLIGRAM(S): 20 TABLET, DELAYED RELEASE ORAL at 05:44

## 2022-06-18 RX ADMIN — ISOSORBIDE DINITRATE 40 MILLIGRAM(S): 5 TABLET ORAL at 21:18

## 2022-06-18 RX ADMIN — SODIUM NITROPRUSSIDE 2.73 MICROGRAM(S)/KG/MIN: 50 INJECTION INTRAVENOUS at 21:17

## 2022-06-18 RX ADMIN — AMIODARONE HYDROCHLORIDE 200 MILLIGRAM(S): 400 TABLET ORAL at 05:43

## 2022-06-18 RX ADMIN — Medication 100 MILLIGRAM(S): at 12:15

## 2022-06-18 RX ADMIN — Medication 100 MILLIGRAM(S): at 05:43

## 2022-06-18 RX ADMIN — Medication 81 MILLIGRAM(S): at 12:15

## 2022-06-18 RX ADMIN — HEPARIN SODIUM 14 UNIT(S)/HR: 5000 INJECTION INTRAVENOUS; SUBCUTANEOUS at 21:17

## 2022-06-18 RX ADMIN — Medication 100 MILLIGRAM(S): at 21:18

## 2022-06-18 RX ADMIN — ISOSORBIDE DINITRATE 40 MILLIGRAM(S): 5 TABLET ORAL at 13:15

## 2022-06-18 RX ADMIN — ISOSORBIDE DINITRATE 40 MILLIGRAM(S): 5 TABLET ORAL at 05:43

## 2022-06-18 RX ADMIN — HEPARIN SODIUM 15 UNIT(S)/HR: 5000 INJECTION INTRAVENOUS; SUBCUTANEOUS at 14:23

## 2022-06-18 RX ADMIN — Medication 100 MILLIGRAM(S): at 13:14

## 2022-06-18 RX ADMIN — Medication 25 MILLIGRAM(S): at 05:43

## 2022-06-18 RX ADMIN — MILRINONE LACTATE 11 MICROGRAM(S)/KG/MIN: 1 INJECTION, SOLUTION INTRAVENOUS at 21:17

## 2022-06-18 NOTE — PROGRESS NOTE ADULT - ASSESSMENT
====================ASSESSMENT ==============  66 year old man w/ PMHx HTN, HLD, type 2 DM, chronic HFrEF (EF 25-30% by Echo), complete heart block s/p PPM (in 2006, upgraded to BiV-ICD in 09/2016), CAD (s/p recent BERNABE mid LAD at [facility] on 04/18/2022), and stage II CKD (baseline Cr ~1.3) presented with near syncope to OSH found to have 41 episodes of VT on device, s/p unsuccessful VT ablation and transferred to [facility] for management of cardiogenic shock and advanced therapy eval.       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  - bedside nipride study done 5/29 with reduction in PVR to <2    ====================CARDIOVASCULAR==================  Cardiogenic shock  - Maintain IABP 1:1,monitor PTT on heparin gtt  - Maintain Milrinone 0.5mcg/kg/min  - Cont hydral 100 TID and ISDN 40TID and Nipride gtt for AL Reduction, with goal Assisted mean goal 70-80   - monitor hemodynamics and perfusion indices  - HF following, listed for OHT Status 2E      VTach   - s/p EPS on 5/24, unable to perform ablation as no substrate found and did not induce VT because of severely low EF   - Interrogation of ICD @Dr Wilson's office 5/20: back-to-back episodes of VT @ 200+ bpm all terminating with ATP. Since last cath pt has had 41 VT episodes (all falling into VF zone)  - Normal device function. BIV pacing 97%. No programming changes made  - c/w Amiodarone 200 QD  - c/w Toprol 25 daily  - No further VT on inotropic support  - keep K 4-4.5  - EP consult 6/15 for atrial tachycardia, prior VT as assessment of amiodarone; device interrogated, no recorded AT/AF or VT/VF. Per EP, no new interventions for slow NSVT. c/w amiodarone and toprol.    CAD   - Chest pain free and compliant with DAPT since last PCI 4/18/22 per discussion with interventional at OSH, ok to DC plavix pending cardiac surgery, last dose 5/28  - Cardiac cath @[facility] 4/18/22: mLAD 90% s/p BERNABE, LCx mild disease, RCA mild disease s/p diagnostic cardiac cath w/ Dr Wagner on 05/23/2022   - C/w ASA, d/c'ed lipitor for elevated LFTs, pending transplant    ===================HEMATOLOGIC/ONC ===================  ?Secondary Polycythemia Vera   - elevated EPO, hgb in normal range  - as per heme low suspicion for PV  - MELINA-2 wnl    AC therapy   - Heparin gtt for IABP    ===================== RENAL =========================  JAGRUTI on CKD II  Admitted w/ Cr 2.01 (baseline Cr ~1.3), initial JAGRUTI 2/2 cardiogenic shock given RHC findings of CI 1.88. Improving  - Avoid nephrotoxic agents, NSAIDs. Renally dose meds.  - monitor strict I/Os and continue current cardiac support  - Continue monitoring urine output    Hyponatremia  - Na improving  - Urine studies so far c/w possible SIADH vs poor Na intake. Cardiorenal c/s 6/17.     ==================== GASTROINTESTINAL===================  Elevated Liver Enzymes:  - elevated AST/ALT, normal bilirubin, alk phos. statin d/c 6/13. Downtrending  - Abd US on 6/14: Liver wnl, contracted gallbladder in the setting of recent meal. No acute cholecystitis.    Diet:   - Tolerating PO diet, Constant carb    Constipation:  - Cont. bowel regimen Miralax, Senna, PRN simethicone and bisacodyl   - s/p colonoscopy 6/3 with 5 polyps removed and biopsied, benign findings- GI recs for 3 yr follow up colo  - Protonix for GI prophylaxis    =======================    ENDOCRINE  =====================  T2DM (A1C 6.2 on admission)  - Cont. ISS, glucose controlled, monitor FS closely     Gout flare, Left knee (previously R)  - continue allopurinol, s/p prednisone  - 6/15: with right wrist pain, left knee and left ankle pain possible 2/2 gout flare, xray wrist with findings c/w OA    ========================INFECTIOUS DISEASE================  UE Thrombophlebitis  - RUE US Duplex showing superficial thrombus  - s/p course of Ancef    No active issues  - afebrile, mild leukocytosis  - UA neg, BCx on 6/7 NGTD x2, RVP neg   - follow fever curve, WBC   - CXR 6/17 with b/l patchy opacities c/w atelectasis, encourage incentive spirometer          ====================ASSESSMENT ==============  66 year old man w/ PMHx HTN, HLD, type 2 DM, chronic HFrEF (EF 25-30% by Echo), complete heart block s/p PPM (in 2006, upgraded to BiV-ICD in 09/2016), CAD (s/p recent BERNABE mid LAD at [facility] on 04/18/2022), and stage II CKD (baseline Cr ~1.3) presented with near syncope to OSH found to have 41 episodes of VT on device, s/p unsuccessful VT ablation and transferred to [facility] for management of cardiogenic shock and advanced therapy eval.       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  - bedside nipride study done 5/29 with reduction in PVR to <2    ====================CARDIOVASCULAR==================  Cardiogenic shock  - Maintain IABP 1:1,monitor PTT on heparin gtt  - Maintain Milrinone 0.5mcg/kg/min  - Cont hydral 100 TID and ISDN 40TID and Nipride gtt for AL Reduction, with goal Assisted mean goal 70-80   - Follow up [place] level   - HF following, listed for OHT Status 2E      VTach   - s/p EPS on 5/24, unable to perform ablation as no substrate found and did not induce VT because of severely low EF   - Interrogation of ICD @Dr Wilson's office 5/20: back-to-back episodes of VT @ 200+ bpm all terminating with ATP. Since last cath pt has had 41 VT episodes (all falling into VF zone)  - Normal device function. BIV pacing 97%. No programming changes made  - Cont. Amiodarone 200 QD and Toprol 25 daily  - EP consult 6/15 for atrial tachycardia, prior VT as assessment of amiodarone; device interrogated, no recorded AT/AF or VT/VF. Per EP, no new interventions for slow NSVT. c/w amiodarone and toprol.    CAD   - Chest pain free and compliant with DAPT since last PCI 4/18/22 per discussion with interventional at OSH, ok to DC plavix pending cardiac surgery, last dose 5/28  - Cardiac cath @[facility] 4/18/22: mLAD 90% s/p BERNABE, LCx mild disease, RCA mild disease s/p diagnostic cardiac cath w/ Dr Wagner on 05/23/2022   - C/w ASA, d/c'ed lipitor for elevated LFTs, pending transplant    ===================HEMATOLOGIC/ONC ===================  ?Secondary Polycythemia Vera   - elevated EPO, hgb in normal range  - as per heme low suspicion for PV  - MELINA-2 wnl    AC therapy   - Heparin gtt for IABP    ===================== RENAL =========================  JAGRUTI on CKD II  Admitted w/ Cr 2.01 (baseline Cr ~1.3), initial JAGRUTI 2/2 cardiogenic shock given RHC findings of CI 1.88. Improving  - Avoid nephrotoxic agents, NSAIDs. Renally dose meds.  - monitor strict I/Os and continue current cardiac support  - Continue monitoring urine output    Hyponatremia Na stable 128-130  - Urine studies so far c/w possible SIADH vs poor Na intake. Cardiorenal c/s 6/17.     ==================== GASTROINTESTINAL===================  Elevated Liver Enzymes:  - elevated AST/ALT, normal bilirubin, alk phos. statin d/c 6/13. Downtrending  - Abd US on 6/14: Liver wnl, contracted gallbladder in the setting of recent meal. No acute cholecystitis.    Diet:   - Tolerating PO diet, Constant carb    Constipation:  - Cont. bowel regimen Miralax, Senna, PRN simethicone and bisacodyl   - s/p colonoscopy 6/3 with 5 polyps removed and biopsied, benign findings- GI recs for 3 yr follow up colo  - Protonix for GI prophylaxis    =======================    ENDOCRINE  =====================  T2DM (A1C 6.2 on admission)  - Cont. ISS, glucose controlled, monitor FS closely     Gout flare, Left knee (previously R)  - continue allopurinol, s/p prednisone  - 6/15: with right wrist pain, left knee and left ankle pain possible 2/2 gout flare, xray wrist with findings c/w OA    Bilateral knee pain in setting of osteoarthritis   - Tylenol PRN     ========================INFECTIOUS DISEASE================  UE Thrombophlebitis  - RUE US Duplex showing superficial thrombus  - s/p course of Ancef    No active issues  - afebrile, mild leukocytosis  - UA neg, BCx on 6/7 NGTD x2, RVP neg   - follow fever curve, WBC   - CXR 6/17 with b/l patchy opacities c/w atelectasis, encourage incentive spirometer

## 2022-06-18 NOTE — PROGRESS NOTE ADULT - SUBJECTIVE AND OBJECTIVE BOX
Interval History: No acute events overnight    Medications:  acetaminophen     Tablet .. 650 milliGRAM(s) Oral every 6 hours PRN  allopurinol 100 milliGRAM(s) Oral daily  aMIOdarone    Tablet 200 milliGRAM(s) Oral daily  aspirin enteric coated 81 milliGRAM(s) Oral daily  bisacodyl Suppository 10 milliGRAM(s) Rectal daily PRN  chlorhexidine 2% Cloths 1 Application(s) Topical daily  chlorhexidine 4% Liquid 1 Application(s) Topical <User Schedule>  heparin  Infusion 1200 Unit(s)/Hr IV Continuous <Continuous>  hydrALAZINE 100 milliGRAM(s) Oral every 8 hours  insulin lispro (ADMELOG) corrective regimen sliding scale   SubCutaneous at bedtime  insulin lispro (ADMELOG) corrective regimen sliding scale   SubCutaneous three times a day before meals  isosorbide   dinitrate Tablet (ISORDIL) 40 milliGRAM(s) Oral three times a day  metoprolol succinate ER 25 milliGRAM(s) Oral daily  milrinone Infusion 0.5 MICROgram(s)/kG/Min IV Continuous <Continuous>  nitroprusside Infusion 0.25 MICROgram(s)/kG/Min IV Continuous <Continuous>  pantoprazole    Tablet 40 milliGRAM(s) Oral before breakfast  polyethylene glycol 3350 17 Gram(s) Oral daily  senna 1 Tablet(s) Oral at bedtime  simethicone 80 milliGRAM(s) Chew every 6 hours PRN  sodium chloride 0.65% Nasal 1 Spray(s) Both Nostrils every 6 hours PRN      Vitals:  T(C): 36.8 (22 @ 12:00), Max: 37.3 (22 @ 19:00)  HR: 85 (22 @ 13:00) (76 - 116)  BP: --  BP(mean): --  RR: 28 (22 @ 13:00) (18 - 28)  SpO2: 92% (22 @ 13:00) (90% - 98%)    Daily     Daily Weight in k.2 (2022 06:00)        I&O's Summary    2022 07:01  -  2022 07:00  --------------------------------------------------------  IN: 1780.2 mL / OUT: 2950 mL / NET: -1169.8 mL    2022 07:01  -  2022 13:22  --------------------------------------------------------  IN: 804.9 mL / OUT: 875 mL / NET: -70.1 mL        Physical Exam:  Appearance: No Acute Distress  HEENT: PERRL  Neck: No JVD  Cardiovascular: Normal S1 S2, No murmurs/rubs/gallops  Respiratory: Clear to auscultation bilaterally  Gastrointestinal: Soft, Non-tender	  Skin: No cyanosis	  Neurologic: Non-focal  Extremities: No LE edema  Psychiatry: A & O x 3, Mood & affect appropriate    Labs:                        12.9   12.47 )-----------( 338      ( 2022 05:41 )             39.3     06-18    129<L>  |  99  |  22  ----------------------------<  124<H>  4.6   |  18<L>  |  1.18    Ca    9.4      2022 05:41  Phos  3.7     06-18  Mg     2.0     06-18    TPro  6.9  /  Alb  3.0<L>  /  TBili  0.4  /  DBili  x   /  AST  38  /  ALT  56<H>  /  AlkPhos  85  06-18    PT/INR - ( 2022 04:43 )   PT: 13.2 sec;   INR: 1.15 ratio         PTT - ( 2022 05:41 )  PTT:74.2 sec

## 2022-06-18 NOTE — PROGRESS NOTE ADULT - PROBLEM SELECTOR PLAN 5
- hx of VT s/p unsuccessful VT ablation  - continue on amio 200 mg PO QD  - since being admitted to [facility] has not had any episodes of VT, currently tolerating high dose milrinone.  -paced at 80 on telemetry with few PVCs

## 2022-06-18 NOTE — PROGRESS NOTE ADULT - SUBJECTIVE AND OBJECTIVE BOX
CICU DAY NOTE  Admission date: 5/26/22  Chief complaint/ Diagnosis: Cardiogenic shock   HPI: 66M Mixed Ischemic/NICM Cardiomyopathy (EF 25-30% at baseline, s/p BIV-ICD), CAD (medically managed MIs in 2008,2011, Most recent stent in April 2022 to mLAD) presented with multiple near syncope, found to have 41 episodes of VT and admitted initially to cardiac telemetry for further evaluation/management. Ischemic evaluation without new disease and VT ablation without substrate to complete ablation.   Transferred from [facility] for further management and evaluation for LVAD vs OHT.     Interval history: Uneventful overnight  Aug     REVIEW OF SYSTEMS  Denies CP, Palpitation, SOB, Dyspnea[ x ] All other systems negative    MEDICATIONS  (STANDING)  allopurinol 100 milliGRAM(s) Oral daily  aMIOdarone    Tablet 200 milliGRAM(s) Oral daily  aspirin enteric coated 81 milliGRAM(s) Oral daily  chlorhexidine 2% Cloths 1 Application(s) Topical daily  chlorhexidine 4% Liquid 1 Application(s) Topical <User Schedule>  heparin  Infusion 1200 Unit(s)/Hr (16 mL/Hr) IV Continuous <Continuous>  hydrALAZINE 100 milliGRAM(s) Oral every 8 hours  insulin lispro (ADMELOG) corrective regimen sliding scale   SubCutaneous at bedtime  insulin lispro (ADMELOG) corrective regimen sliding scale   SubCutaneous three times a day before meals  isosorbide   dinitrate Tablet (ISORDIL) 40 milliGRAM(s) Oral three times a day  metoprolol succinate ER 25 milliGRAM(s) Oral daily  milrinone Infusion 0.5 MICROgram(s)/kG/Min (11 mL/Hr) IV Continuous <Continuous>  nitroprusside Infusion 0.25 MICROgram(s)/kG/Min (2.73 mL/Hr) IV Continuous <Continuous>  pantoprazole    Tablet 40 milliGRAM(s) Oral before breakfast  polyethylene glycol 3350 17 Gram(s) Oral daily  senna 1 Tablet(s) Oral at bedtime    MEDICATIONS  (PRN):  acetaminophen     Tablet .. 650 milliGRAM(s) Oral every 6 hours PRN Temp greater or equal to 38C (100.4F), Mild Pain (1 - 3)  bisacodyl Suppository 10 milliGRAM(s) Rectal daily PRN Constipation  simethicone 80 milliGRAM(s) Chew every 6 hours PRN Gas  sodium chloride 0.65% Nasal 1 Spray(s) Both Nostrils every 6 hours PRN Nasal Congestion    PAST MEDICAL & SURGICAL HISTORY:  AV block  Essential hypertension  Chronic HFrEF (heart failure with reduced ejection fraction)  Chronic kidney disease, unspecified CKD stage  CAD (coronary artery disease)  HLD (hyperlipidemia)  Type 2 diabetes mellitus  Artificial cardiac pacemaker    FAMILY HISTORY:  Family history of acute myocardial infarction (Father)72    Allergy   No Known Allergies    ICU Vital Signs Last 24 Hrs  T(C): 36.7 (Max: 37.3)  HR: 79 (76 - 116)  RR: 19 (18 - 28)  SpO2: 91% (90% - 98%)    I&O's Summary  IN: 1780.2 mL / OUT: 2950 mL(voids)/ NET: -1169.8 mL    PHYSICAL EXAM  Appearance: Normal, NAD  HEAD:  Normocephalic  EYES: PERRLA, conjunctiva and sclera clear  NECK: Supple, No JVD  CHEST/LUNG: Clear to auscultation bilaterally; No wheezing  HEART: Normal S1, S2. No murmurs, rubs, or gallops  ABDOMEN: + Bowel sounds, Soft, NT, ND   EXTREMITIES:  2+ Peripheral Pulses, No clubbing, cyanosis, or edema  NEUROLOGY: non-focal, aaox3  Lymphatic: No lymphadenopathy  SKIN: No rashes or lesions    Interpretation of Telemetry:                                           12.9 <13.2  12.47<12.92<10.17 )-----------( 338                                        39.3       129<130<128  |  99  |  22  ----------------------------<  124<H>  4.6   |  18<L>  |  1.18<1.09<1.24    Ca    9.4   Phos  3.7     Mg     2.0  TPro  6.9  /  Alb  3.0<L>  /  TBili  0.4  /  DBili  x   /  AST  38  /  ALT  56<H>  /  AlkPhos  85   PTT 74.2  F/S 124-154             CICU DAY NOTE  Admission date: 5/26/22  Chief complaint/ Diagnosis: Cardiogenic shock   HPI: 66M Mixed Ischemic/NICM Cardiomyopathy (EF 25-30% at baseline, s/p BIV-ICD), CAD (medically managed MIs in 2008,2011, Most recent stent in April 2022 to mLAD) presented with multiple near syncope, found to have 41 episodes of VT and admitted initially to cardiac telemetry for further evaluation/management. Ischemic evaluation without new disease and VT ablation without substrate to complete ablation.   Transferred from [facility] for further management and evaluation for LVAD vs OHT.     Interval history: Uneventful overnight  Aug   No VT episode     REVIEW OF SYSTEMS  Denies CP, Palpitation, SOB, Dyspnea[ x ] All other systems negative    MEDICATIONS  (STANDING)  allopurinol 100 milliGRAM(s) Oral daily  aMIOdarone    Tablet 200 milliGRAM(s) Oral daily  aspirin enteric coated 81 milliGRAM(s) Oral daily  chlorhexidine 2% Cloths 1 Application(s) Topical daily  chlorhexidine 4% Liquid 1 Application(s) Topical <User Schedule>  heparin  Infusion 1200 Unit(s)/Hr (16 mL/Hr) IV Continuous <Continuous>  hydrALAZINE 100 milliGRAM(s) Oral every 8 hours  insulin lispro (ADMELOG) corrective regimen sliding scale   SubCutaneous at bedtime  insulin lispro (ADMELOG) corrective regimen sliding scale   SubCutaneous three times a day before meals  isosorbide   dinitrate Tablet (ISORDIL) 40 milliGRAM(s) Oral three times a day  metoprolol succinate ER 25 milliGRAM(s) Oral daily  milrinone Infusion 0.5 MICROgram(s)/kG/Min (11 mL/Hr) IV Continuous <Continuous>  nitroprusside Infusion 0.25 MICROgram(s)/kG/Min (2.73 mL/Hr) IV Continuous <Continuous>  pantoprazole    Tablet 40 milliGRAM(s) Oral before breakfast  polyethylene glycol 3350 17 Gram(s) Oral daily  senna 1 Tablet(s) Oral at bedtime    MEDICATIONS  (PRN):  acetaminophen     Tablet .. 650 milliGRAM(s) Oral every 6 hours PRN Temp greater or equal to 38C (100.4F), Mild Pain (1 - 3)  bisacodyl Suppository 10 milliGRAM(s) Rectal daily PRN Constipation  simethicone 80 milliGRAM(s) Chew every 6 hours PRN Gas  sodium chloride 0.65% Nasal 1 Spray(s) Both Nostrils every 6 hours PRN Nasal Congestion    PAST MEDICAL & SURGICAL HISTORY:  AV block  Essential hypertension  Chronic HFrEF (heart failure with reduced ejection fraction)  Chronic kidney disease, unspecified CKD stage  CAD (coronary artery disease)  HLD (hyperlipidemia)  Type 2 diabetes mellitus  Artificial cardiac pacemaker    FAMILY HISTORY:  Family history of acute myocardial infarction (Father)72    Allergy   No Known Allergies    ICU Vital Signs Last 24 Hrs  T(C): 36.7 (Max: 37.3)  HR: 79 (76 - 116)  RR: 19 (18 - 28)  SpO2: 91% (90% - 98%)    I&O's Summary  IN: 1780.2 mL / OUT: 2950 mL(voids)/ NET: -1169.8 mL    PHYSICAL EXAM  Appearance: Normal, NAD  HEAD:  Normocephalic  EYES: PERRLA, conjunctiva and sclera clear  NECK: Supple, No JVD  CHEST/LUNG: Clear to auscultation bilaterally; No wheezing  HEART: Normal S1, S2. No murmurs, rubs, or gallops  ABDOMEN: + Bowel sounds, Soft, NT, ND   EXTREMITIES:  2+ Peripheral Pulses, No clubbing, cyanosis, or edema  NEUROLOGY: non-focal, aaox3  Lymphatic: No lymphadenopathy  SKIN: No rashes or lesions    Interpretation of Telemetry:                                           12.9 <13.2  12.47<12.92<10.17 )-----------( 338                                        39.3       129<130<128  |  99  |  22  ----------------------------<  124<H>  4.6   |  18<L>  |  1.18<1.09<1.24    Ca    9.4   Phos  3.7     Mg     2.0  TPro  6.9  /  Alb  3.0<L>  /  TBili  0.4  /  DBili  x   /  AST  38  /  ALT  56<H>  /  AlkPhos  85   PTT 74.2  F/S 124-154             CICU DAY NOTE  Admission date: 5/26/22  Chief complaint/ Diagnosis: Cardiogenic shock   HPI: 66M Mixed Ischemic/NICM Cardiomyopathy (EF 25-30% at baseline, s/p BIV-ICD), CAD (medically managed MIs in 2008,2011, Most recent stent in April 2022 to mLAD) presented with multiple near syncope, found to have 41 episodes of VT and admitted initially to cardiac telemetry for further evaluation/management. Ischemic evaluation without new disease and VT ablation without substrate to complete ablation.   Transferred from [facility] for further management and evaluation for LVAD vs OHT.     Interval history: Uneventful overnight  Aug   No VT episode     REVIEW OF SYSTEMS  Denies CP, Palpitation, SOB, Dyspnea[ x ] All other systems negative    MEDICATIONS  (STANDING)  allopurinol 100 milliGRAM(s) Oral daily  aMIOdarone    Tablet 200 milliGRAM(s) Oral daily  aspirin enteric coated 81 milliGRAM(s) Oral daily  chlorhexidine 2% Cloths 1 Application(s) Topical daily  chlorhexidine 4% Liquid 1 Application(s) Topical <User Schedule>  heparin  Infusion 1200 Unit(s)/Hr (16 mL/Hr) IV Continuous <Continuous>  hydrALAZINE 100 milliGRAM(s) Oral every 8 hours  insulin lispro (ADMELOG) corrective regimen sliding scale   SubCutaneous at bedtime  insulin lispro (ADMELOG) corrective regimen sliding scale   SubCutaneous three times a day before meals  isosorbide   dinitrate Tablet (ISORDIL) 40 milliGRAM(s) Oral three times a day  metoprolol succinate ER 25 milliGRAM(s) Oral daily  milrinone Infusion 0.5 MICROgram(s)/kG/Min (11 mL/Hr) IV Continuous <Continuous>  nitroprusside Infusion 0.25 MICROgram(s)/kG/Min (2.73 mL/Hr) IV Continuous <Continuous>  pantoprazole    Tablet 40 milliGRAM(s) Oral before breakfast  polyethylene glycol 3350 17 Gram(s) Oral daily  senna 1 Tablet(s) Oral at bedtime    MEDICATIONS  (PRN):  acetaminophen     Tablet .. 650 milliGRAM(s) Oral every 6 hours PRN Temp greater or equal to 38C (100.4F), Mild Pain (1 - 3)  bisacodyl Suppository 10 milliGRAM(s) Rectal daily PRN Constipation  simethicone 80 milliGRAM(s) Chew every 6 hours PRN Gas  sodium chloride 0.65% Nasal 1 Spray(s) Both Nostrils every 6 hours PRN Nasal Congestion    PAST MEDICAL & SURGICAL HISTORY:  AV block  Essential hypertension  Chronic HFrEF (heart failure with reduced ejection fraction)  Chronic kidney disease, unspecified CKD stage  CAD (coronary artery disease)  HLD (hyperlipidemia)  Type 2 diabetes mellitus  Artificial cardiac pacemaker    FAMILY HISTORY:  Family history of acute myocardial infarction (Father)72    Allergy   No Known Allergies    ICU Vital Signs Last 24 Hrs  T(C): 36.7 (Max: 37.3)  HR: 79 (76 - 116)  RR: 19 (18 - 28)  SpO2: 91% (90% - 98%)    I&O's Summary  IN: 1780.2 mL / OUT: 2950 mL(voids)/ NET: -1169.8 mL    PHYSICAL EXAM  Appearance: Normal, NAD  HEAD:  Normocephalic  EYES: PERRL, conjunctiva and sclera clear  NECK: Supple, No JVD  CHEST/LUNG: Clear to auscultation bilaterally; No wheezing  HEART: Normal S1, S2. No murmurs, rubs, or gallops  ABDOMEN: + Bowel sounds, Soft, NT, ND   EXTREMITIES:  2+ Peripheral Pulses, No clubbing, cyanosis, or edema  NEUROLOGY: non-focal, aaox3  SKIN: No rashes or lesions    Interpretation of Telemetry:                                           12.9 <13.2  12.47<12.92<10.17 )-----------( 338                                        39.3       129<130<128  |  99  |  22  ----------------------------<  124<H>  4.6   |  18<L>  |  1.18<1.09<1.24    Ca    9.4   Phos  3.7     Mg     2.0  TPro  6.9  /  Alb  3.0<L>  /  TBili  0.4  /  DBili  x   /  AST  38  /  ALT  56<H>  /  AlkPhos  85   PTT 74.2  F/S 124-154

## 2022-06-18 NOTE — PROGRESS NOTE ADULT - PROBLEM SELECTOR PLAN 1
- continue IABP 1:1  - continue milrinone 0.5 mcg/kg/min  - continue HDZN 100 mg TID and ISDN 40 mg TID for afterload reduction  - titrate nipride for assisted means 70-80 on IABP  - appreciate cardiorenal recs regarding sodium

## 2022-06-18 NOTE — PROGRESS NOTE ADULT - ASSESSMENT
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 LHC did not reveal new obstructive CAD and he underwent EPS which did not reveal endocardial substrate amenable for ablation. RHC revealed severely depressed cardiac output and he was transferred to [facility] 5/26 for advanced therapies evaluation.    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 He improved significantly with sequential uptitration of inotropes and increased pacing rate, but on 6/6 he had worsening JAGRUTI. He is s/p RHC which revealed severely elevated filling pressures, severe cpcPH, and CI of 1.9 on milrinone 0.5. IABP was placed and his renal function/PAPs have improved. He is MCS dependent and was inadequately supported with high dose inotropes, so was listed UNOS status 2e for OHT, awaiting suitable donor. However, was made status 7 as he became febrile, last 6/7 T 38. He has been afebrile since and blood cultures from 6/7 with NGTD x 48 hours and his leukocytosis has not worsened. Discussed with transplant ID and since bld cx negative x48hrs he has been re-listed UNOS status 2e. He had a mild rise in his Cr earlier this week that has now improved with gentle hydration. He does have a worsening hyponatremia, likely hypovolemic hyponatremia given recurrent net negative fluid balance with last measured CVP of 1. Overall currently stable awaiting suitable donor.     Review of studies  TTE 5/21: LV 5.2 cm, LVEF 10-15%, LVOT VTI 10 cm, moderate RV dysfunction, mild AI, minimal MR  EKG: a-BiV paced  [facility] 5/23: patent mLAD stent with slow flow, D1 with 40-50% stenosis, mild disease otherwise  RHC 5/26: RA 12, PA 70/40 (50), PCWP 22, Milana CO/CI 2.3/1.3, MAP 83 with SVR 2469, PVR 12 PERSAUD    Hemodynamics  5/27 (milrinone 0.25): RA 10, PA 76/35 (49), PCWP 34, PA sat 57% with Milana CO/CI 3.1/1.6, MAP 71 with SVR 1574, PVR 4.8  5/28 (on milrinone 0.375): RA 7, PA 63/31, PCWP 26, PA sat 72.8% with Milana CO/CI 4.9/2.5 (CI later dropped to 1.6 in the afternoon), MAP 70 with SVR 1039, PVR 2.9  5/29 (on milrinone 0.5): RA 8, PA 75/32 (50), PCWP 28, PA sat 74% with Milana CO/CI 5.0/2.6, MAP 77 with SVR 1200, PVR 4.4  5/29 (on milrinone 0.5 and nipride to 3 mcg/kg/min): RA 6, PA 59/31 (40), PCWP 30, PA sat 79% with Milana CO/CI 7.0/3.6, BP 97/57 (MAP 70) with , PVR 1.4  6/6 RHC (mil 0.5): RA 11 (v13), RV 75/17, PA 80/36/52, PCWP 24 (v29), PA sat 59.5%, CO/CI (F) 3.58/1.88  6/7 (mil 0.5, IABP 1:1): CVP 5, PA 66/32/45, PA sat 65.7%, CO/CI (F) 4.0/2.1, SVR 2000  6/8 (mil 0.5, IABP 1:1): CVP 1, PA 46/19/28, PA sat 72.7%, CO/CI (F) 5.6/2.9, SVR 1257  6/8 PM (mil 0.5, IABP 1:1): CVP 4, PA 68/25/38, PA sat 68%, CO/CI (f) 5/2.6, SVR 1326

## 2022-06-18 NOTE — PROGRESS NOTE ADULT - CRITICAL CARE ATTENDING COMMENT
Presented to [facility] with VT, found to be in cardiogenic shock and transferred to [facility]  A+Ox3  Listed for heart transplant  Cardiogenic shock, on Milrinone, Nipride and IABP   On max doses of Hydralazine/Isordil for afterload reduction - will maintain.    Continue Toprol and Amio for VT  S/p stent in 4/22, continue ASA but holding Plavix   O2 sats mid 90s on room air  DASH/diabetic diet  improved creatinine, cont to monitor     H/H stable, on Heparin drip for IABP  Sugars controlled  IABP 6/6

## 2022-06-18 NOTE — PROGRESS NOTE ADULT - SUBJECTIVE AND OBJECTIVE BOX
GOCOOL, LENNOX  MRN-[id number]  Patient is a 66y old  Male who presents with a chief complaint of LVAD/OHT eval (18 Jun 2022 13:21)    HPI:  66M Mixed Ischemic/NICM Cardiomyopathy (EF 25-30% at baseline, s/p BIV-ICD), CAD (medically managed MIs in 2008,2011, Most recent stent in April 2022 to mLAD) presented with multiple near syncope, found to have 41 episodes of VT and admitted initially to cardiac telemetry for further evaluation/management. Ischemic evaluation without new disease and VT ablation without substrate to complete ablation.   Transferred from [facility] for further management and evaluation for LVAD vs OHT.    (26 May 2022 20:31)      Hospital Course:  5/26 Transferred to CCU for further management  24 HOUR EVENTS:    REVIEW OF SYSTEMS:    CONSTITUTIONAL: No weakness, fevers or chills  EYES/ENT: No visual changes;  No vertigo or throat pain   NECK: No pain or stiffness  RESPIRATORY: No cough, wheezing, hemoptysis; No shortness of breath  CARDIOVASCULAR: No chest pain or palpitations  GASTROINTESTINAL: No abdominal or epigastric pain. No nausea, vomiting, or hematemesis; No diarrhea or constipation. No melena or hematochezia.  GENITOURINARY: No dysuria, frequency or hematuria  NEUROLOGICAL: No numbness or weakness  SKIN: No itching, rashes      ICU Vital Signs Last 24 Hrs  T(C): 36.8 (18 Jun 2022 12:00), Max: 37.3 (18 Jun 2022 03:00)  T(F): 98.2 (18 Jun 2022 12:00), Max: 99.2 (18 Jun 2022 03:00)  HR: 80 (18 Jun 2022 20:00) (76 - 116)  BP: --  BP(mean): --  ABP: --  ABP(mean): --  RR: 22 (18 Jun 2022 20:00) (19 - 28)  SpO2: 90% (18 Jun 2022 20:00) (90% - 98%)      CVP(mm Hg): --  CO: --  CI: --  PA: --  PA(mean): --  PA(direct): --  PCWP: --  LA: --  RA: --  SVR: --  SVRI: --  PVR: --  PVRI: --  I&O's Summary    17 Jun 2022 07:01  -  18 Jun 2022 07:00  --------------------------------------------------------  IN: 1780.2 mL / OUT: 2950 mL / NET: -1169.8 mL    18 Jun 2022 07:01  -  18 Jun 2022 20:57  --------------------------------------------------------  IN: 1355 mL / OUT: 1325 mL / NET: 30 mL        CAPILLARY BLOOD GLUCOSE    CAPILLARY BLOOD GLUCOSE      POCT Blood Glucose.: 121 mg/dL (18 Jun 2022 20:40)      PHYSICAL EXAM:  GENERAL: No acute distress, well-developed  HEAD:  Atraumatic, Normocephalic  EYES: EOMI, PERRLA, conjunctiva and sclera clear  NECK: Supple, no lymphadenopathy, no JVD  CHEST/LUNG: CTAB; No wheezes, rales, or rhonchi  HEART: Regular rate and rhythm. Normal S1/S2. No murmurs, rubs, or gallops  ABDOMEN: Soft, non-tender, non-distended; normal bowel sounds, no organomegaly  EXTREMITIES:  2+ peripheral pulses b/l, No clubbing, cyanosis, or edema  NEUROLOGY: A&O x 3, no focal deficits  SKIN: No rashes or lesions    ============================I/O===========================   I&O's Detail    17 Jun 2022 07:01  -  18 Jun 2022 07:00  --------------------------------------------------------  IN:    Heparin: 402.5 mL    Milrinone: 262.9 mL    Nitroprusside: 754.8 mL    Oral Fluid: 360 mL  Total IN: 1780.2 mL    OUT:    Voided (mL): 2950 mL  Total OUT: 2950 mL    Total NET: -1169.8 mL      18 Jun 2022 07:01  -  18 Jun 2022 20:57  --------------------------------------------------------  IN:    Heparin: 176 mL    Milrinone: 119.9 mL    Nitroprusside: 399.1 mL    Oral Fluid: 660 mL  Total IN: 1355 mL    OUT:    Voided (mL): 1325 mL  Total OUT: 1325 mL    Total NET: 30 mL        ============================ LABS =========================                        12.9   12.47 )-----------( 338      ( 18 Jun 2022 05:41 )             39.3     06-18    129<L>  |  99  |  22  ----------------------------<  124<H>  4.6   |  18<L>  |  1.18    Ca    9.4      18 Jun 2022 05:41  Phos  3.7     06-18  Mg     2.0     06-18    TPro  6.9  /  Alb  3.0<L>  /  TBili  0.4  /  DBili  x   /  AST  38  /  ALT  56<H>  /  AlkPhos  85  06-18                LIVER FUNCTIONS - ( 18 Jun 2022 05:41 )  Alb: 3.0 g/dL / Pro: 6.9 g/dL / ALK PHOS: 85 U/L / ALT: 56 U/L / AST: 38 U/L / GGT: x           PT/INR - ( 17 Jun 2022 04:43 )   PT: 13.2 sec;   INR: 1.15 ratio         PTT - ( 18 Jun 2022 12:52 )  PTT:67.5 sec    Lactate, Blood: 1.3 mmol/L (06-17-22 @ 04:43)  Lactate, Blood: 1.6 mmol/L (06-16-22 @ 04:20)      ======================Micro/Rad/Cardio=================  Telemtry: Reviewed   EKG: Reviewed  CXR: Reviewed  Culture: Reviewed   Echo:   Cath:   ======================================================  PAST MEDICAL & SURGICAL HISTORY:  AV block      Essential hypertension      Chronic HFrEF (heart failure with reduced ejection fraction)      Chronic kidney disease, unspecified CKD stage      CAD (coronary artery disease)      HLD (hyperlipidemia)      Type 2 diabetes mellitus      Artificial cardiac pacemaker        ====================ASSESSMENT ==============  66 year old man w/ PMHx HTN, HLD, type 2 DM, chronic HFrEF (EF 25-30% by Echo), complete heart block s/p PPM (in 2006, upgraded to BiV-ICD in 09/2016), CAD (s/p recent BERNABE mid LAD at [facility] on 04/18/2022), and stage II CKD (baseline Cr ~1.3) presented with near syncope to OSH found to have 41 episodes of VT on device, s/p unsuccessful VT ablation and transferred to [facility] for management of cardiogenic shock and advanced therapy eval.                 Plan:  ====================== NEUROLOGY=====================   AAOx3  - No active issues  - Tylenol PRN for fevers/pain    acetaminophen     Tablet .. 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  - bedside nipride study done 5/29 with reduction in PVR to <2      ====================CARDIOVASCULAR==================  Cardiogenic shock  - Maintain IABP 1:1,monitor PTT on heparin gtt  - Maintain Milrinone 0.5mcg/kg/min  - Cont hydral 100 TID and ISDN 40TID and Nipride gtt for AL Reduction, with goal Assisted mean goal 70-80   - Follow up Cynaide level   - HF following, listed for OHT Status 2E  -nitropress for BP management      VTach   - s/p EPS on 5/24, unable to perform ablation as no substrate found and did not induce VT because of severely low EF   - Interrogation of ICD @Dr Wilson's office 5/20: back-to-back episodes of VT @ 200+ bpm all terminating with ATP. Since last cath pt has had 41 VT episodes (all falling into VF zone)  - Normal device function. BIV pacing 97%. No programming changes made  - Cont. Amiodarone 200 QD and Toprol 25 daily  - EP consult 6/15 for atrial tachycardia, prior VT as assessment of amiodarone; device interrogated, no recorded AT/AF or VT/VF. Per EP, no new interventions for slow NSVT. c/w amiodarone and toprol.      CAD   - Chest pain free and compliant with DAPT since last PCI 4/18/22 per discussion with interventional at OSH, ok to DC plavix pending cardiac surgery, last dose 5/28  - isodril for chest pain  - Cardiac cath @[facility] 4/18/22: mLAD 90% s/p BERNABE, LCx mild disease, RCA mild disease s/p diagnostic cardiac cath w/ Dr Wagner on 05/23/2022   - C/w ASA, d/c'ed lipitor for elevated LFTs, pending transplant    aspirin enteric coated 81 milliGRAM(s) Oral daily  aMIOdarone    Tablet 200 milliGRAM(s) Oral daily  hydrALAZINE 100 milliGRAM(s) Oral every 8 hours  isosorbide   dinitrate Tablet (ISORDIL) 40 milliGRAM(s) Oral three times a day  metoprolol succinate ER 25 milliGRAM(s) Oral daily  milrinone Infusion 0.5 MICROgram(s)/kG/Min (11 mL/Hr) IV Continuous <Continuous>  nitroprusside Infusion 0.25 MICROgram(s)/kG/Min (2.73 mL/Hr) IV Continuous <Continuous>  ===================HEMATOLOGIC/ONC ===================  ?Secondary Polycythemia Vera   - elevated EPO, hgb in normal range  - as per heme low suspicion for PV  - MELINA-2 wnl    AC therapy   - Heparin gtt for IABP    heparin  Infusion 1200 Unit(s)/Hr (15 mL/Hr) IV Continuous <Continuous>    ===================== RENAL =========================  JAGRUTI on CKD II  Admitted w/ Cr 2.01 (baseline Cr ~1.3), initial JAGRUTI 2/2 cardiogenic shock given RHC findings of CI 1.88. Improving  - Avoid nephrotoxic agents, NSAIDs. Renally dose meds.  - monitor strict I/Os and continue current cardiac support  - Continue monitoring urine output    Hyponatremia Na stable 128-130  - Urine studies so far c/w possible SIADH vs poor Na intake. Cardiorenal c/s 6/17.         ==================== GASTROINTESTINAL===================  Elevated Liver Enzymes:  - elevated AST/ALT, normal bilirubin, alk phos. statin d/c 6/13. Downtrending  - Abd US on 6/14: Liver wnl, contracted gallbladder in the setting of recent meal. No acute cholecystitis.    Diet:   - Tolerating PO diet, Constant carb    Constipation:  - Cont. bowel regimen Miralax, Senna, PRN simethicone and bisacodyl   - s/p colonoscopy 6/3 with 5 polyps removed and biopsied, benign findings- GI recs for 3 yr follow up colo  - Protonix for GI prophylaxis    bisacodyl Suppository 10 milliGRAM(s) Rectal daily PRN Constipation  pantoprazole    Tablet 40 milliGRAM(s) Oral before breakfast  polyethylene glycol 3350 17 Gram(s) Oral daily  senna 1 Tablet(s) Oral at bedtime  simethicone 80 milliGRAM(s) Chew every 6 hours PRN Gas    =======================    ENDOCRINE  =====================  T2DM (A1C 6.2 on admission)  - Cont. ISS, glucose controlled, monitor FS closely     Gout flare, Left knee (previously R)  - continue allopurinol, s/p prednisone  - 6/15: with right wrist pain, left knee and left ankle pain possible 2/2 gout flare, xray wrist with findings c/w OA    Bilateral knee pain in setting of osteoarthritis   - Tylenol PRN     allopurinol 100 milliGRAM(s) Oral daily  insulin lispro (ADMELOG) corrective regimen sliding scale   SubCutaneous at bedtime  insulin lispro (ADMELOG) corrective regimen sliding scale   SubCutaneous three times a day before meals    ========================INFECTIOUS DISEASE================  UE Thrombophlebitis  - RUE US Duplex showing superficial thrombus  - s/p course of Ancef    No active issues  - afebrile, mild leukocytosis  - UA neg, BCx on 6/7 NGTD x2, RVP neg   - follow fever curve, WBC   - CXR 6/17 with b/l patchy opacities c/w atelectasis, encourage incentive spirometer       Patient requires continuous monitoring with bedside rhythm monitoring, pulse ox monitoring, and intermittent blood gas analysis. Care plan discussed with ICU care team. Patient remained critical and at risk for life threatening decompensation.  Patient seen, examined and plan discussed with CCU team during rounds.     I have personally provided ____ minutes of critical care time excluding time spent on separate procedures, in addition to initial critical care time provided by the CICU Attending, Dr. Blandon .     By signing my name below, I, Bear Reagan, attest that this documentation has been prepared under the direction and in the presence of RAMSES Jimenez  Electronically signed: Mini Kirkpatrick, 06-18-22 @ 20:57    I, RAMSES Jimenez, personally performed the services described in this documentation. all medical record entries made by the scribe were at my direction and in my presence. I have reviewed the chart and agree that the record reflects my personal performance and is accurate and complete  Electronically signed: RAMSES Jimenez         GOCOOL, LENNOX  [id number]  Patient is a 66y old  Male who presents with a chief complaint of LVAD/OHT eval (18 Jun 2022 13:21)    HPI:  66M Mixed Ischemic/NICM Cardiomyopathy (EF 25-30% at baseline, s/p BIV-ICD), CAD (medically managed MIs in 2008,2011, Most recent stent in April 2022 to mLAD) presented with multiple near syncope, found to have 41 episodes of VT and admitted initially to cardiac telemetry for further evaluation/management. Ischemic evaluation without new disease and VT ablation without substrate to complete ablation.   Transferred from [facility] for further management and evaluation for LVAD vs OHT.    (26 May 2022 20:31)      Hospital Course:  5/26 Transferred to CCU for further management  24 HOUR EVENTS: no acute events    REVIEW OF SYSTEMS:    CONSTITUTIONAL: No weakness, fevers or chills  EYES/ENT: No visual changes;  No vertigo or throat pain   NECK: No pain or stiffness  RESPIRATORY: No cough, wheezing, hemoptysis; No shortness of breath  CARDIOVASCULAR: No chest pain or palpitations  GASTROINTESTINAL: No abdominal or epigastric pain. No nausea, vomiting, or hematemesis; No diarrhea or constipation. No melena or hematochezia.  GENITOURINARY: No dysuria, frequency or hematuria  NEUROLOGICAL: No numbness or weakness  SKIN: No itching, rashes      ICU Vital Signs Last 24 Hrs  T(C): 36.8 (18 Jun 2022 12:00), Max: 37.3 (18 Jun 2022 03:00)  T(F): 98.2 (18 Jun 2022 12:00), Max: 99.2 (18 Jun 2022 03:00)  HR: 80 (18 Jun 2022 20:00) (76 - 116)  RR: 22 (18 Jun 2022 20:00) (19 - 28)  SpO2: 90% (18 Jun 2022 20:00) (90% - 98%)      I&O's Summary    17 Jun 2022 07:01  -  18 Jun 2022 07:00  --------------------------------------------------------  IN: 1780.2 mL / OUT: 2950 mL / NET: -1169.8 mL    18 Jun 2022 07:01  -  18 Jun 2022 20:57  --------------------------------------------------------  IN: 1355 mL / OUT: 1325 mL / NET: 30 mL        CAPILLARY BLOOD GLUCOSE    CAPILLARY BLOOD GLUCOSE      POCT Blood Glucose.: 121 mg/dL (18 Jun 2022 20:40)          ============================I/O===========================   I&O's Detail    17 Jun 2022 07:01  -  18 Jun 2022 07:00  --------------------------------------------------------  IN:    Heparin: 402.5 mL    Milrinone: 262.9 mL    Nitroprusside: 754.8 mL    Oral Fluid: 360 mL  Total IN: 1780.2 mL    OUT:    Voided (mL): 2950 mL  Total OUT: 2950 mL    Total NET: -1169.8 mL      18 Jun 2022 07:01  -  18 Jun 2022 20:57  --------------------------------------------------------  IN:    Heparin: 176 mL    Milrinone: 119.9 mL    Nitroprusside: 399.1 mL    Oral Fluid: 660 mL  Total IN: 1355 mL    OUT:    Voided (mL): 1325 mL  Total OUT: 1325 mL    Total NET: 30 mL        ============================ LABS =========================                        12.9   12.47 )-----------( 338      ( 18 Jun 2022 05:41 )             39.3     06-18    129<L>  |  99  |  22  ----------------------------<  124<H>  4.6   |  18<L>  |  1.18    Ca    9.4      18 Jun 2022 05:41  Phos  3.7     06-18  Mg     2.0     06-18    TPro  6.9  /  Alb  3.0<L>  /  TBili  0.4  /  DBili  x   /  AST  38  /  ALT  56<H>  /  AlkPhos  85  06-18                LIVER FUNCTIONS - ( 18 Jun 2022 05:41 )  Alb: 3.0 g/dL / Pro: 6.9 g/dL / ALK PHOS: 85 U/L / ALT: 56 U/L / AST: 38 U/L / GGT: x           PT/INR - ( 17 Jun 2022 04:43 )   PT: 13.2 sec;   INR: 1.15 ratio         PTT - ( 18 Jun 2022 12:52 )  PTT:67.5 sec    Lactate, Blood: 1.3 mmol/L (06-17-22 @ 04:43)  Lactate, Blood: 1.6 mmol/L (06-16-22 @ 04:20)      ======================Micro/Rad/Cardio=================  Telemtry: Reviewed   EKG: Reviewed  CXR: Reviewed  Culture: Reviewed   Echo:   Cath:   ======================================================  PAST MEDICAL & SURGICAL HISTORY:  AV block      Essential hypertension      Chronic HFrEF (heart failure with reduced ejection fraction)      Chronic kidney disease, unspecified CKD stage      CAD (coronary artery disease)      HLD (hyperlipidemia)      Type 2 diabetes mellitus      Artificial cardiac pacemaker        ====================ASSESSMENT ==============  66 year old man w/ PMHx HTN, HLD, type 2 DM, chronic HFrEF (EF 25-30% by Echo), complete heart block s/p PPM (in 2006, upgraded to BiV-ICD in 09/2016), CAD (s/p recent BERNABE mid LAD at [facility] on 04/18/2022), and stage II CKD (baseline Cr ~1.3) presented with near syncope to OSH found to have 41 episodes of VT on device, s/p unsuccessful VT ablation and transferred to [facility] for management of cardiogenic shock and advanced therapy eval.         Plan:  ====================== NEUROLOGY=====================   AAOx3  - No active issues  - Tylenol PRN for fevers/pain    acetaminophen     Tablet .. 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  - bedside nipride study done 5/29 with reduction in PVR to <2      ====================CARDIOVASCULAR==================  Cardiogenic shock  - Maintain IABP 1:1,monitor PTT on heparin gtt  - Maintain Milrinone 0.5mcg/kg/min  - Cont hydral 100 TID and ISDN 40TID and Nipride gtt for AL Reduction, with goal Assisted mean goal 70-80   - Follow up Cynaide level   - HF following, listed for OHT Status 2E  -nitropress for BP management      VTach   - s/p EPS on 5/24, unable to perform ablation as no substrate found and did not induce VT because of severely low EF   - Interrogation of ICD @Dr Wilson's office 5/20: back-to-back episodes of VT @ 200+ bpm all terminating with ATP. Since last cath pt has had 41 VT episodes (all falling into VF zone)  - Normal device function. BIV pacing 97%. No programming changes made  - Cont. Amiodarone 200 QD and Toprol 25 daily  - EP consult 6/15 for atrial tachycardia, prior VT as assessment of amiodarone; device interrogated, no recorded AT/AF or VT/VF. Per EP, no new interventions for slow NSVT. c/w amiodarone and toprol.      CAD   - Chest pain free and compliant with DAPT since last PCI 4/18/22 per discussion with interventional at OSH, ok to DC plavix pending cardiac surgery, last dose 5/28  - isodril for chest pain  - Cardiac cath @[facility] 4/18/22: mLAD 90% s/p BERNABE, LCx mild disease, RCA mild disease s/p diagnostic cardiac cath w/ Dr Wagner on 05/23/2022   - C/w ASA, d/c'ed lipitor for elevated LFTs, pending transplant    aspirin enteric coated 81 milliGRAM(s) Oral daily  aMIOdarone    Tablet 200 milliGRAM(s) Oral daily  hydrALAZINE 100 milliGRAM(s) Oral every 8 hours  isosorbide   dinitrate Tablet (ISORDIL) 40 milliGRAM(s) Oral three times a day  metoprolol succinate ER 25 milliGRAM(s) Oral daily  milrinone Infusion 0.5 MICROgram(s)/kG/Min (11 mL/Hr) IV Continuous <Continuous>  nitroprusside Infusion 0.25 MICROgram(s)/kG/Min (2.73 mL/Hr) IV Continuous <Continuous>  ===================HEMATOLOGIC/ONC ===================  ?Secondary Polycythemia Vera   - elevated EPO, hgb in normal range  - as per heme low suspicion for PV  - MELINA-2 wnl    AC therapy   - Heparin gtt for IABP    heparin  Infusion 1200 Unit(s)/Hr (15 mL/Hr) IV Continuous <Continuous>    ===================== RENAL =========================  JAGRUTI on CKD II  Admitted w/ Cr 2.01 (baseline Cr ~1.3), initial JAGRUTI 2/2 cardiogenic shock given RHC findings of CI 1.88. Improving  - Avoid nephrotoxic agents, NSAIDs. Renally dose meds.  - monitor strict I/Os and continue current cardiac support  - Continue monitoring urine output    Hyponatremia Na stable 128-130  - Urine studies so far c/w possible SIADH vs poor Na intake. Cardiorenal c/s 6/17.         ==================== GASTROINTESTINAL===================  Elevated Liver Enzymes:  - elevated AST/ALT, normal bilirubin, alk phos. statin d/c 6/13. Downtrending  - Abd US on 6/14: Liver wnl, contracted gallbladder in the setting of recent meal. No acute cholecystitis.    Diet:   - Tolerating PO diet, Constant carb    Constipation:  - Cont. bowel regimen Miralax, Senna, PRN simethicone and bisacodyl   - s/p colonoscopy 6/3 with 5 polyps removed and biopsied, benign findings- GI recs for 3 yr follow up colo  - Protonix for GI prophylaxis    bisacodyl Suppository 10 milliGRAM(s) Rectal daily PRN Constipation  pantoprazole    Tablet 40 milliGRAM(s) Oral before breakfast  polyethylene glycol 3350 17 Gram(s) Oral daily  senna 1 Tablet(s) Oral at bedtime  simethicone 80 milliGRAM(s) Chew every 6 hours PRN Gas    =======================    ENDOCRINE  =====================  T2DM (A1C 6.2 on admission)  - Cont. ISS, glucose controlled, monitor FS closely     Gout flare, Left knee (previously R)  - continue allopurinol, s/p prednisone  - 6/15: with right wrist pain, left knee and left ankle pain possible 2/2 gout flare, xray wrist with findings c/w OA    Bilateral knee pain in setting of osteoarthritis   - Tylenol PRN     allopurinol 100 milliGRAM(s) Oral daily  insulin lispro (ADMELOG) corrective regimen sliding scale   SubCutaneous at bedtime  insulin lispro (ADMELOG) corrective regimen sliding scale   SubCutaneous three times a day before meals    ========================INFECTIOUS DISEASE================  UE Thrombophlebitis  - RUE US Duplex showing superficial thrombus  - s/p course of Ancef    No active issues  - afebrile, mild leukocytosis  - UA neg, BCx on 6/7 NGTD x2, RVP neg   - follow fever curve, WBC   - CXR 6/17 with b/l patchy opacities c/w atelectasis, encourage incentive spirometer       Patient requires continuous monitoring with bedside rhythm monitoring, pulse ox monitoring, and intermittent blood gas analysis. Care plan discussed with ICU care team. Patient remained critical and at risk for life threatening decompensation.  Patient seen, examined and plan discussed with CCU team during rounds.     I have personally provided 35 minutes of critical care time excluding time spent on separate procedures, in addition to initial critical care time provided by the CICU Attending, Dr. Blnadon .     By signing my name below, I, Bear Reagan, attest that this documentation has been prepared under the direction and in the presence of RAMSES Jimenez  Electronically signed: Mini Kirkpatrick, 06-18-22 @ 20:57    I, RAMSES Jimenez, personally performed the services described in this documentation. all medical record entries made by the nelidaibshira were at my direction and in my presence. I have reviewed the chart and agree that the record reflects my personal performance and is accurate and complete  Electronically signed: RAMSES Jimenez

## 2022-06-19 LAB
ALBUMIN SERPL ELPH-MCNC: 3.1 G/DL — LOW (ref 3.3–5)
ALBUMIN SERPL ELPH-MCNC: 3.6 G/DL — SIGNIFICANT CHANGE UP (ref 3.3–5)
ALP SERPL-CCNC: 86 U/L — SIGNIFICANT CHANGE UP (ref 40–120)
ALP SERPL-CCNC: 88 U/L — SIGNIFICANT CHANGE UP (ref 40–120)
ALT FLD-CCNC: 64 U/L — HIGH (ref 10–45)
ALT FLD-CCNC: 64 U/L — HIGH (ref 10–45)
ANION GAP SERPL CALC-SCNC: 10 MMOL/L — SIGNIFICANT CHANGE UP (ref 5–17)
ANION GAP SERPL CALC-SCNC: 12 MMOL/L — SIGNIFICANT CHANGE UP (ref 5–17)
APTT BLD: 47.2 SEC — HIGH (ref 27.5–35.5)
APTT BLD: 78.7 SEC — HIGH (ref 27.5–35.5)
APTT BLD: 79.9 SEC — HIGH (ref 27.5–35.5)
AST SERPL-CCNC: 35 U/L — SIGNIFICANT CHANGE UP (ref 10–40)
AST SERPL-CCNC: 38 U/L — SIGNIFICANT CHANGE UP (ref 10–40)
BASOPHILS # BLD AUTO: 0.14 K/UL — SIGNIFICANT CHANGE UP (ref 0–0.2)
BASOPHILS NFR BLD AUTO: 1 % — SIGNIFICANT CHANGE UP (ref 0–2)
BILIRUB SERPL-MCNC: 0.4 MG/DL — SIGNIFICANT CHANGE UP (ref 0.2–1.2)
BILIRUB SERPL-MCNC: 0.5 MG/DL — SIGNIFICANT CHANGE UP (ref 0.2–1.2)
BUN SERPL-MCNC: 23 MG/DL — SIGNIFICANT CHANGE UP (ref 7–23)
BUN SERPL-MCNC: 24 MG/DL — HIGH (ref 7–23)
CALCIUM SERPL-MCNC: 9.1 MG/DL — SIGNIFICANT CHANGE UP (ref 8.4–10.5)
CALCIUM SERPL-MCNC: 9.4 MG/DL — SIGNIFICANT CHANGE UP (ref 8.4–10.5)
CHLORIDE SERPL-SCNC: 100 MMOL/L — SIGNIFICANT CHANGE UP (ref 96–108)
CHLORIDE SERPL-SCNC: 98 MMOL/L — SIGNIFICANT CHANGE UP (ref 96–108)
CO2 SERPL-SCNC: 20 MMOL/L — LOW (ref 22–31)
CO2 SERPL-SCNC: 22 MMOL/L — SIGNIFICANT CHANGE UP (ref 22–31)
CREAT SERPL-MCNC: 1.1 MG/DL — SIGNIFICANT CHANGE UP (ref 0.5–1.3)
CREAT SERPL-MCNC: 1.24 MG/DL — SIGNIFICANT CHANGE UP (ref 0.5–1.3)
EGFR: 65 ML/MIN/1.73M2 — SIGNIFICANT CHANGE UP
EGFR: 75 ML/MIN/1.73M2 — SIGNIFICANT CHANGE UP
EOSINOPHIL # BLD AUTO: 0.74 K/UL — HIGH (ref 0–0.5)
EOSINOPHIL NFR BLD AUTO: 5.3 % — SIGNIFICANT CHANGE UP (ref 0–6)
GLUCOSE BLDC GLUCOMTR-MCNC: 116 MG/DL — HIGH (ref 70–99)
GLUCOSE BLDC GLUCOMTR-MCNC: 121 MG/DL — HIGH (ref 70–99)
GLUCOSE SERPL-MCNC: 115 MG/DL — HIGH (ref 70–99)
GLUCOSE SERPL-MCNC: 134 MG/DL — HIGH (ref 70–99)
HCT VFR BLD CALC: 38.5 % — LOW (ref 39–50)
HGB BLD-MCNC: 12.8 G/DL — LOW (ref 13–17)
IMM GRANULOCYTES NFR BLD AUTO: 1.9 % — HIGH (ref 0–1.5)
INR BLD: 1.17 RATIO — HIGH (ref 0.88–1.16)
LACTATE SERPL-SCNC: 1.2 MMOL/L — SIGNIFICANT CHANGE UP (ref 0.7–2)
LYMPHOCYTES # BLD AUTO: 2.99 K/UL — SIGNIFICANT CHANGE UP (ref 1–3.3)
LYMPHOCYTES # BLD AUTO: 21.5 % — SIGNIFICANT CHANGE UP (ref 13–44)
MAGNESIUM SERPL-MCNC: 2 MG/DL — SIGNIFICANT CHANGE UP (ref 1.6–2.6)
MCHC RBC-ENTMCNC: 29.6 PG — SIGNIFICANT CHANGE UP (ref 27–34)
MCHC RBC-ENTMCNC: 33.2 GM/DL — SIGNIFICANT CHANGE UP (ref 32–36)
MCV RBC AUTO: 89.1 FL — SIGNIFICANT CHANGE UP (ref 80–100)
MONOCYTES # BLD AUTO: 1.29 K/UL — HIGH (ref 0–0.9)
MONOCYTES NFR BLD AUTO: 9.3 % — SIGNIFICANT CHANGE UP (ref 2–14)
NEUTROPHILS # BLD AUTO: 8.5 K/UL — HIGH (ref 1.8–7.4)
NEUTROPHILS NFR BLD AUTO: 61 % — SIGNIFICANT CHANGE UP (ref 43–77)
NRBC # BLD: 0 /100 WBCS — SIGNIFICANT CHANGE UP (ref 0–0)
PHOSPHATE SERPL-MCNC: 3.8 MG/DL — SIGNIFICANT CHANGE UP (ref 2.5–4.5)
PLATELET # BLD AUTO: 368 K/UL — SIGNIFICANT CHANGE UP (ref 150–400)
POTASSIUM SERPL-MCNC: 4.4 MMOL/L — SIGNIFICANT CHANGE UP (ref 3.5–5.3)
POTASSIUM SERPL-MCNC: 4.6 MMOL/L — SIGNIFICANT CHANGE UP (ref 3.5–5.3)
POTASSIUM SERPL-SCNC: 4.4 MMOL/L — SIGNIFICANT CHANGE UP (ref 3.5–5.3)
POTASSIUM SERPL-SCNC: 4.6 MMOL/L — SIGNIFICANT CHANGE UP (ref 3.5–5.3)
PROT SERPL-MCNC: 6.7 G/DL — SIGNIFICANT CHANGE UP (ref 6–8.3)
PROT SERPL-MCNC: 7.1 G/DL — SIGNIFICANT CHANGE UP (ref 6–8.3)
PROTHROM AB SERPL-ACNC: 13.5 SEC — HIGH (ref 10.5–13.4)
RBC # BLD: 4.32 M/UL — SIGNIFICANT CHANGE UP (ref 4.2–5.8)
RBC # FLD: 16.2 % — HIGH (ref 10.3–14.5)
SODIUM SERPL-SCNC: 130 MMOL/L — LOW (ref 135–145)
SODIUM SERPL-SCNC: 132 MMOL/L — LOW (ref 135–145)
WBC # BLD: 13.93 K/UL — HIGH (ref 3.8–10.5)
WBC # FLD AUTO: 13.93 K/UL — HIGH (ref 3.8–10.5)

## 2022-06-19 PROCEDURE — 99292 CRITICAL CARE ADDL 30 MIN: CPT

## 2022-06-19 PROCEDURE — 93010 ELECTROCARDIOGRAM REPORT: CPT

## 2022-06-19 PROCEDURE — 71045 X-RAY EXAM CHEST 1 VIEW: CPT | Mod: 26

## 2022-06-19 PROCEDURE — 99291 CRITICAL CARE FIRST HOUR: CPT

## 2022-06-19 RX ADMIN — HEPARIN SODIUM 13.5 UNIT(S)/HR: 5000 INJECTION INTRAVENOUS; SUBCUTANEOUS at 04:51

## 2022-06-19 RX ADMIN — Medication 25 MILLIGRAM(S): at 05:04

## 2022-06-19 RX ADMIN — ISOSORBIDE DINITRATE 40 MILLIGRAM(S): 5 TABLET ORAL at 21:04

## 2022-06-19 RX ADMIN — PANTOPRAZOLE SODIUM 40 MILLIGRAM(S): 20 TABLET, DELAYED RELEASE ORAL at 05:04

## 2022-06-19 RX ADMIN — HEPARIN SODIUM 12 UNIT(S)/HR: 5000 INJECTION INTRAVENOUS; SUBCUTANEOUS at 11:38

## 2022-06-19 RX ADMIN — ISOSORBIDE DINITRATE 40 MILLIGRAM(S): 5 TABLET ORAL at 05:03

## 2022-06-19 RX ADMIN — Medication 100 MILLIGRAM(S): at 12:14

## 2022-06-19 RX ADMIN — Medication 81 MILLIGRAM(S): at 12:15

## 2022-06-19 RX ADMIN — MILRINONE LACTATE 11 MICROGRAM(S)/KG/MIN: 1 INJECTION, SOLUTION INTRAVENOUS at 21:03

## 2022-06-19 RX ADMIN — HEPARIN SODIUM 13 UNIT(S)/HR: 5000 INJECTION INTRAVENOUS; SUBCUTANEOUS at 21:03

## 2022-06-19 RX ADMIN — ISOSORBIDE DINITRATE 40 MILLIGRAM(S): 5 TABLET ORAL at 14:05

## 2022-06-19 RX ADMIN — Medication 100 MILLIGRAM(S): at 14:06

## 2022-06-19 RX ADMIN — Medication 100 MILLIGRAM(S): at 05:03

## 2022-06-19 RX ADMIN — SODIUM NITROPRUSSIDE 2.73 MICROGRAM(S)/KG/MIN: 50 INJECTION INTRAVENOUS at 21:04

## 2022-06-19 RX ADMIN — CHLORHEXIDINE GLUCONATE 1 APPLICATION(S): 213 SOLUTION TOPICAL at 21:06

## 2022-06-19 RX ADMIN — CHLORHEXIDINE GLUCONATE 1 APPLICATION(S): 213 SOLUTION TOPICAL at 05:04

## 2022-06-19 RX ADMIN — Medication 100 MILLIGRAM(S): at 21:04

## 2022-06-19 RX ADMIN — AMIODARONE HYDROCHLORIDE 200 MILLIGRAM(S): 400 TABLET ORAL at 05:04

## 2022-06-19 NOTE — PROGRESS NOTE ADULT - CRITICAL CARE ATTENDING COMMENT
Presented to [facility] with VT, found to be in cardiogenic shock and transferred to [facility]  A+Ox3  Listed for heart transplant  Cardiogenic shock, on Milrinone, Nipride and IABP   On max doses of Hydralazine/Isordil for afterload reduction - will maintain.    Continue Toprol and Amio for VT  S/p stent in 4/22, continue ASA but holding Plavix   O2 sats mid 90s on room air  DASH/diabetic diet  stable creatinine, hyponatremia improved, cont to monitor     H/H stable, on Heparin drip for IABP  Sugars controlled  afebrile, increased WBC, cont to monitor   IABP 6/6.

## 2022-06-19 NOTE — PROGRESS NOTE ADULT - SUBJECTIVE AND OBJECTIVE BOX
PATIENT:  LENNOX GOCOOL  [id number]    CHIEF COMPLAINT:  Patient is a 66y old  Male who presents with a chief complaint of LVAD/OHT eval (2022 20:57)      INTERVAL HISTORYOVERNIGHT EVENTS:      REVIEW OF SYSTEMS:    Constitutional:     [ ] negative [ ] fevers [ ] chills [ ] weight loss [ ] weight gain  HEENT:                  [ ] negative [ ] dry eyes [ ] eye irritation [ ] postnasal drip [ ] nasal congestion  CV:                         [ ] negative  [ ] chest pain [ ] orthopnea [ ] palpitations [ ] murmur  Resp:                     [ ] negative [ ] cough [ ] shortness of breath [ ] dyspnea [ ] wheezing [ ] sputum [ ] hemoptysis  GI:                          [ ] negative [ ] nausea [ ] vomiting [ ] diarrhea [ ] constipation [ ] abd pain [ ] dysphagia   :                        [ ] negative [ ] dysuria [ ] nocturia [ ] hematuria [ ] increased urinary frequency  Musculoskeletal: [ ] negative [ ] back pain [ ] myalgias [ ] arthralgias [ ] fracture  Skin:                       [ ] negative [ ] rash [ ] itch  Neurological:        [ ] negative [ ] headache [ ] dizziness [ ] syncope [ ] weakness [ ] numbness  Psychiatric:           [ ] negative [ ] anxiety [ ] depression  Endocrine:            [ ] negative [ ] diabetes [ ] thyroid problem  Heme/Lymph:      [ ] negative [ ] anemia [ ] bleeding problem  Allergic/Immune: [ ] negative [ ] itchy eyes [ ] nasal discharge [ ] hives [ ] angioedema    [ ] All other systems negative  [ ] Unable to assess ROS because ________.    MEDICATIONS:  MEDICATIONS  (STANDING):  allopurinol 100 milliGRAM(s) Oral daily  aMIOdarone    Tablet 200 milliGRAM(s) Oral daily  aspirin enteric coated 81 milliGRAM(s) Oral daily  chlorhexidine 2% Cloths 1 Application(s) Topical daily  chlorhexidine 4% Liquid 1 Application(s) Topical <User Schedule>  heparin  Infusion 1200 Unit(s)/Hr (13.5 mL/Hr) IV Continuous <Continuous>  hydrALAZINE 100 milliGRAM(s) Oral every 8 hours  insulin lispro (ADMELOG) corrective regimen sliding scale   SubCutaneous at bedtime  insulin lispro (ADMELOG) corrective regimen sliding scale   SubCutaneous three times a day before meals  isosorbide   dinitrate Tablet (ISORDIL) 40 milliGRAM(s) Oral three times a day  metoprolol succinate ER 25 milliGRAM(s) Oral daily  milrinone Infusion 0.5 MICROgram(s)/kG/Min (11 mL/Hr) IV Continuous <Continuous>  nitroprusside Infusion 0.25 MICROgram(s)/kG/Min (2.73 mL/Hr) IV Continuous <Continuous>  pantoprazole    Tablet 40 milliGRAM(s) Oral before breakfast  polyethylene glycol 3350 17 Gram(s) Oral daily  senna 1 Tablet(s) Oral at bedtime    MEDICATIONS  (PRN):  acetaminophen     Tablet .. 650 milliGRAM(s) Oral every 6 hours PRN Temp greater or equal to 38C (100.4F), Mild Pain (1 - 3)  bisacodyl Suppository 10 milliGRAM(s) Rectal daily PRN Constipation  simethicone 80 milliGRAM(s) Chew every 6 hours PRN Gas  sodium chloride 0.65% Nasal 1 Spray(s) Both Nostrils every 6 hours PRN Nasal Congestion      ALLERGIES:  Allergies    No Known Allergies    Intolerances        OBJECTIVE:  ICU Vital Signs Last 24 Hrs  T(C): 37.1 (2022 07:01), Max: 37.4 (2022 00:00)  T(F): 98.8 (2022 07:01), Max: 99.4 (2022 00:00)  HR: 102 (2022 07:01) (78 - 116)  BP: --  BP(mean): --  ABP: --  ABP(mean): --  RR: 19 (2022 07:01) (19 - 28)  SpO2: 90% (2022 07:01) (88% - 94%)      Adult Advanced Hemodynamics Last 24 Hrs  CVP(mm Hg): --  CVP(cm H2O): --  CO: --  CI: --  PA: --  PA(mean): --  PCWP: --  SVR: --  SVRI: --  PVR: --  PVRI: --  CAPILLARY BLOOD GLUCOSE      POCT Blood Glucose.: 121 mg/dL (2022 20:40)  POCT Blood Glucose.: 123 mg/dL (2022 17:07)  POCT Blood Glucose.: 137 mg/dL (2022 12:11)  POCT Blood Glucose.: 124 mg/dL (2022 08:12)    CAPILLARY BLOOD GLUCOSE      POCT Blood Glucose.: 121 mg/dL (2022 20:40)    I&O's Summary    2022 07:01  -  2022 07:00  --------------------------------------------------------  IN: 2129.4 mL / OUT: 2725 mL / NET: -595.6 mL      Daily     Daily Weight in k.8 (2022 05:00)    PHYSICAL EXAMINATION:  General: WN/WD NAD  HEENT: PERRLA, EOMI, moist mucous membranes  Neurology: A&Ox3, nonfocal, BRADFORD x 4  Respiratory: CTA B/L, normal respiratory effort, no wheezes, crackles, rales  CV: RRR, S1S2, no murmurs, rubs or gallops  Abdominal: Soft, NT, ND +BS, Last BM  Extremities: No edema, + peripheral pulses  Incisions:   Tubes:    LABS:                          12.8   13.93 )-----------( 368      ( 2022 04:00 )             38.5     06-19    130<L>  |  98  |  24<H>  ----------------------------<  115<H>  4.6   |  20<L>  |  1.24    Ca    9.1      2022 04:00  Phos  3.8     06-  Mg     2.0     -19    TPro  6.7  /  Alb  3.1<L>  /  TBili  0.5  /  DBili  x   /  AST  38  /  ALT  64<H>  /  AlkPhos  86  06-19    LIVER FUNCTIONS - ( 2022 04:00 )  Alb: 3.1 g/dL / Pro: 6.7 g/dL / ALK PHOS: 86 U/L / ALT: 64 U/L / AST: 38 U/L / GGT: x           PT/INR - ( 2022 04:00 )   PT: 13.5 sec;   INR: 1.17 ratio         PTT - ( 2022 04:00 )  PTT:78.7 sec          ====================ASSESSMENT ==============  66 year old man w/ PMHx HTN, HLD, type 2 DM, chronic HFrEF (EF 25-30% by Echo), complete heart block s/p PPM (in , upgraded to BiV-ICD in 2016), CAD (s/p recent BERNABE mid LAD at [facility] on 2022), and stage II CKD (baseline Cr ~1.3) presented with near syncope to OSH found to have 41 episodes of VT on device, s/p unsuccessful VT ablation and transferred to [facility] for management of cardiogenic shock and advanced therapy eval.       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  - bedside nipride study done  with reduction in PVR to <2    ====================CARDIOVASCULAR==================  Cardiogenic shock  - Maintain IABP 1:1,monitor PTT on heparin gtt  - Maintain Milrinone 0.5mcg/kg/min  - Cont hydral 100 TID and ISDN 40TID and Nipride gtt for AL Reduction, with goal Assisted mean goal 70-80   - Follow up Cynaide level   - HF following, listed for OHT Status 2E      VTach   - s/p EPS on , unable to perform ablation as no substrate found and did not induce VT because of severely low EF   - Interrogation of ICD @Dr Wilson's office : back-to-back episodes of VT @ 200+ bpm all terminating with ATP. Since last cath pt has had 41 VT episodes (all falling into VF zone)  - Normal device function. BIV pacing 97%. No programming changes made  - Cont. Amiodarone 200 QD and Toprol 25 daily  - EP consult 6/15 for atrial tachycardia, prior VT as assessment of amiodarone; device interrogated, no recorded AT/AF or VT/VF. Per EP, no new interventions for slow NSVT. c/w amiodarone and toprol.    CAD   - Chest pain free and compliant with DAPT since last PCI 22 per discussion with interventional at OSH, ok to DC plavix pending cardiac surgery, last dose   - Cardiac cath @[facility] 22: mLAD 90% s/p BERNABE, LCx mild disease, RCA mild disease s/p diagnostic cardiac cath w/ Dr Wagner on 2022   - C/w ASA, d/c'ed lipitor for elevated LFTs, pending transplant    ===================HEMATOLOGIC/ONC ===================  ?Secondary Polycythemia Vera   - elevated EPO, hgb in normal range  - as per heme low suspicion for PV  - MELINA-2 wnl    AC therapy   - Heparin gtt for IABP    ===================== RENAL =========================  JAGRUTI on CKD II  Admitted w/ Cr 2.01 (baseline Cr ~1.3), initial JAGRUTI 2/2 cardiogenic shock given RHC findings of CI 1.88. Improving  - Avoid nephrotoxic agents, NSAIDs. Renally dose meds.  - monitor strict I/Os and continue current cardiac support  - Continue monitoring urine output    Hyponatremia Na stable 128-130  - Urine studies so far c/w possible SIADH vs poor Na intake. Cardiorenal c/s   - euvolemic/hypovolemic, hypotonic   - would gently replete w/ NS   ==================== GASTROINTESTINAL===================  Elevated Liver Enzymes:  - elevated AST/ALT, normal bilirubin, alk phos. statin d/c . Downtrending  - Abd US on : Liver wnl, contracted gallbladder in the setting of recent meal. No acute cholecystitis.    Diet:   - Tolerating PO diet, Constant carb    Constipation:  - Cont. bowel regimen Miralax, Senna, PRN simethicone and bisacodyl   - s/p colonoscopy 6/3 with 5 polyps removed and biopsied, benign findings- GI recs for 3 yr follow up colo  - Protonix for GI prophylaxis    =======================    ENDOCRINE  =====================  T2DM (A1C 6.2 on admission)  - Cont. ISS, glucose controlled, monitor FS closely     Gout flare, Left knee (previously R)  - continue allopurinol, s/p prednisone  - 6/15: with right wrist pain, left knee and left ankle pain possible 2/2 gout flare, xray wrist with findings c/w OA    Bilateral knee pain in setting of osteoarthritis   - Tylenol PRN     ========================INFECTIOUS DISEASE================  UE Thrombophlebitis  - RUE US Duplex showing superficial thrombus  - s/p course of Ancef     No active issues  - afebrile, mild leukocytosis  - UA neg, BCx on  NGTD x2, RVP neg   - follow fever curve, WBC   - CXR  with b/l patchy opacities c/w atelectasis, encourage incentive spirometer      PATIENT:  LENNOX GOCOOL  [id number]    CHIEF COMPLAINT:  Patient is a 66y old  Male who presents with a chief complaint of LVAD/OHT eval (2022 20:57)      INTERVAL HISTORYOVERNIGHT EVENTS:      REVIEW OF SYSTEMS:    Constitutional:     [ ] negative [ ] fevers [ ] chills [ ] weight loss [ ] weight gain  HEENT:                  [ ] negative [ ] dry eyes [ ] eye irritation [ ] postnasal drip [ ] nasal congestion  CV:                         [ ] negative  [ ] chest pain [ ] orthopnea [ ] palpitations [ ] murmur  Resp:                     [ ] negative [ ] cough [ ] shortness of breath [ ] dyspnea [ ] wheezing [ ] sputum [ ] hemoptysis  GI:                          [ ] negative [ ] nausea [ ] vomiting [ ] diarrhea [ ] constipation [ ] abd pain [ ] dysphagia   :                        [ ] negative [ ] dysuria [ ] nocturia [ ] hematuria [ ] increased urinary frequency  Musculoskeletal: [ ] negative [ ] back pain [ ] myalgias [ ] arthralgias [ ] fracture  Skin:                       [ ] negative [ ] rash [ ] itch  Neurological:        [ ] negative [ ] headache [ ] dizziness [ ] syncope [ ] weakness [ ] numbness  Psychiatric:           [ ] negative [ ] anxiety [ ] depression  Endocrine:            [ ] negative [ ] diabetes [ ] thyroid problem  Heme/Lymph:      [ ] negative [ ] anemia [ ] bleeding problem  Allergic/Immune: [ ] negative [ ] itchy eyes [ ] nasal discharge [ ] hives [ ] angioedema    [ ] All other systems negative  [ ] Unable to assess ROS because ________.    MEDICATIONS:  MEDICATIONS  (STANDING):  allopurinol 100 milliGRAM(s) Oral daily  aMIOdarone    Tablet 200 milliGRAM(s) Oral daily  aspirin enteric coated 81 milliGRAM(s) Oral daily  chlorhexidine 2% Cloths 1 Application(s) Topical daily  chlorhexidine 4% Liquid 1 Application(s) Topical <User Schedule>  heparin  Infusion 1200 Unit(s)/Hr (13.5 mL/Hr) IV Continuous <Continuous>  hydrALAZINE 100 milliGRAM(s) Oral every 8 hours  insulin lispro (ADMELOG) corrective regimen sliding scale   SubCutaneous at bedtime  insulin lispro (ADMELOG) corrective regimen sliding scale   SubCutaneous three times a day before meals  isosorbide   dinitrate Tablet (ISORDIL) 40 milliGRAM(s) Oral three times a day  metoprolol succinate ER 25 milliGRAM(s) Oral daily  milrinone Infusion 0.5 MICROgram(s)/kG/Min (11 mL/Hr) IV Continuous <Continuous>  nitroprusside Infusion 0.25 MICROgram(s)/kG/Min (2.73 mL/Hr) IV Continuous <Continuous>  pantoprazole    Tablet 40 milliGRAM(s) Oral before breakfast  polyethylene glycol 3350 17 Gram(s) Oral daily  senna 1 Tablet(s) Oral at bedtime    MEDICATIONS  (PRN):  acetaminophen     Tablet .. 650 milliGRAM(s) Oral every 6 hours PRN Temp greater or equal to 38C (100.4F), Mild Pain (1 - 3)  bisacodyl Suppository 10 milliGRAM(s) Rectal daily PRN Constipation  simethicone 80 milliGRAM(s) Chew every 6 hours PRN Gas  sodium chloride 0.65% Nasal 1 Spray(s) Both Nostrils every 6 hours PRN Nasal Congestion      ALLERGIES:  Allergies    No Known Allergies    Intolerances        OBJECTIVE:  ICU Vital Signs Last 24 Hrs  T(C): 37.1 (2022 07:01), Max: 37.4 (2022 00:00)  T(F): 98.8 (2022 07:01), Max: 99.4 (2022 00:00)  HR: 102 (2022 07:01) (78 - 116)  BP: --  BP(mean): --  ABP: --  ABP(mean): --  RR: 19 (2022 07:01) (19 - 28)  SpO2: 90% (2022 07:01) (88% - 94%)      Adult Advanced Hemodynamics Last 24 Hrs  CVP(mm Hg): --  CVP(cm H2O): --  CO: --  CI: --  PA: --  PA(mean): --  PCWP: --  SVR: --  SVRI: --  PVR: --  PVRI: --  CAPILLARY BLOOD GLUCOSE      POCT Blood Glucose.: 121 mg/dL (2022 20:40)  POCT Blood Glucose.: 123 mg/dL (2022 17:07)  POCT Blood Glucose.: 137 mg/dL (2022 12:11)  POCT Blood Glucose.: 124 mg/dL (2022 08:12)    CAPILLARY BLOOD GLUCOSE      POCT Blood Glucose.: 121 mg/dL (2022 20:40)    I&O's Summary    2022 07:01  -  2022 07:00  --------------------------------------------------------  IN: 2129.4 mL / OUT: 2725 mL / NET: -595.6 mL      Daily     Daily Weight in k.8 (2022 05:00)    PHYSICAL EXAMINATION:  General: WN/WD NAD  HEENT: EOMI, moist mucous membranes  Neurology: A&Ox3, nonfocal, BRADFORD x 4  Respiratory: CTA B/L, normal respiratory effort, no wheezes, crackles, rales  CV: RRR, S1S2, rubs or gallops  Abdominal: Soft, NT, ND +BS  Extremities: No edema, + peripheral pulses  Incisions:   Tubes:    LABS:                          12.8   13.93 )-----------( 368      ( 2022 04:00 )             38.5     06-19    130<L>  |  98  |  24<H>  ----------------------------<  115<H>  4.6   |  20<L>  |  1.24    Ca    9.1      2022 04:00  Phos  3.8     06-19  Mg     2.0     06-19    TPro  6.7  /  Alb  3.1<L>  /  TBili  0.5  /  DBili  x   /  AST  38  /  ALT  64<H>  /  AlkPhos  86  06-19    LIVER FUNCTIONS - ( 2022 04:00 )  Alb: 3.1 g/dL / Pro: 6.7 g/dL / ALK PHOS: 86 U/L / ALT: 64 U/L / AST: 38 U/L / GGT: x           PT/INR - ( 2022 04:00 )   PT: 13.5 sec;   INR: 1.17 ratio         PTT - ( 2022 04:00 )  PTT:78.7 sec          ====================ASSESSMENT ==============  66 year old man w/ PMHx HTN, HLD, type 2 DM, chronic HFrEF (EF 25-30% by Echo), complete heart block s/p PPM (in , upgraded to BiV-ICD in 2016), CAD (s/p recent BERNABE mid LAD at [facility] on 2022), and stage II CKD (baseline Cr ~1.3) presented with near syncope to OSH found to have 41 episodes of VT on device, s/p unsuccessful VT ablation and transferred to [facility] for management of cardiogenic shock and advanced therapy eval.       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  - bedside nipride study done  with reduction in PVR to <2    ====================CARDIOVASCULAR==================  Cardiogenic shock  - Maintain IABP 1:1,monitor PTT on heparin gtt  - Maintain Milrinone 0.5mcg/kg/min  - Cont hydral 100 TID and ISDN 40TID and Nipride gtt for AL Reduction, with goal Assisted mean goal 70-80   - Follow up Cynaide level   - HF following, listed for OHT Status 2E      VTach   - s/p EPS on , unable to perform ablation as no substrate found and did not induce VT because of severely low EF   - Interrogation of ICD @Dr Wilson's office : back-to-back episodes of VT @ 200+ bpm all terminating with ATP. Since last cath pt has had 41 VT episodes (all falling into VF zone)  - Normal device function. BIV pacing 97%. No programming changes made  - Cont. Amiodarone 200 QD and Toprol 25 daily  - EP consult 6/15 for atrial tachycardia, prior VT as assessment of amiodarone; device interrogated, no recorded AT/AF or VT/VF. Per EP, no new interventions for slow NSVT. c/w amiodarone and toprol.    CAD   - Chest pain free and compliant with DAPT since last PCI 22 per discussion with interventional at OSH, ok to DC plavix pending cardiac surgery, last dose   - Cardiac cath @[facility] 22: mLAD 90% s/p BERNABE, LCx mild disease, RCA mild disease s/p diagnostic cardiac cath w/ Dr Wagner on 2022   - C/w ASA, d/c'ed lipitor for elevated LFTs, pending transplant    ===================HEMATOLOGIC/ONC ===================  ?Secondary Polycythemia Vera   - elevated EPO, hgb in normal range  - as per heme low suspicion for PV  - MELINA-2 wnl    AC therapy   - Heparin gtt for IABP    ===================== RENAL =========================  JAGRUTI on CKD II  Admitted w/ Cr 2.01 (baseline Cr ~1.3), initial JAGRUTI 2/2 cardiogenic shock given RHC findings of CI 1.88. Improving  - Avoid nephrotoxic agents, NSAIDs. Renally dose meds.  - monitor strict I/Os and continue current cardiac support  - Continue monitoring urine output    Hyponatremia Na stable 128-130  - Urine studies so far c/w possible SIADH vs poor Na intake. Cardiorenal c/s   - euvolemic/hypovolemic, hypotonic     ==================== GASTROINTESTINAL===================  Elevated Liver Enzymes:  - elevated AST/ALT, normal bilirubin, alk phos. statin d/c . Downtrending  - Abd US on : Liver wnl, contracted gallbladder in the setting of recent meal. No acute cholecystitis.    Diet:   - Tolerating PO diet, Constant carb    Constipation:  - Cont. bowel regimen Miralax, Senna, PRN simethicone and bisacodyl   - s/p colonoscopy 6/3 with 5 polyps removed and biopsied, benign findings- GI recs for 3 yr follow up colo  - Protonix for GI prophylaxis    =======================    ENDOCRINE  =====================  T2DM (A1C 6.2 on admission)  - Cont. ISS, glucose controlled, monitor FS closely     Gout flare, Left knee (previously R)  - continue allopurinol, s/p prednisone  - 6/15: with right wrist pain, left knee and left ankle pain possible 2/2 gout flare, xray wrist with findings c/w OA    Bilateral knee pain in setting of osteoarthritis   - Tylenol PRN     ========================INFECTIOUS DISEASE================  UE Thrombophlebitis  - RUE US Duplex showing superficial thrombus  - s/p course of Ancef     No active issues  - afebrile, mild leukocytosis  - UA neg, BCx on  NGTD x2, RVP neg   - follow fever curve, WBC   - CXR  with b/l patchy opacities c/w atelectasis, encourage incentive spirometer

## 2022-06-19 NOTE — CHART NOTE - NSCHARTNOTEFT_GEN_A_CORE
Pt with hyponatremia now improved. SNa today at 132. Current present care. Nephrology will sign off, please reconsult as needed.

## 2022-06-20 ENCOUNTER — TRANSCRIPTION ENCOUNTER (OUTPATIENT)
Age: 67
End: 2022-06-20

## 2022-06-20 LAB
ALBUMIN SERPL ELPH-MCNC: 3.1 G/DL — LOW (ref 3.3–5)
ALBUMIN SERPL ELPH-MCNC: 3.4 G/DL — SIGNIFICANT CHANGE UP (ref 3.3–5)
ALBUMIN SERPL ELPH-MCNC: 3.5 G/DL — SIGNIFICANT CHANGE UP (ref 3.3–5)
ALP SERPL-CCNC: 81 U/L — SIGNIFICANT CHANGE UP (ref 40–120)
ALP SERPL-CCNC: 87 U/L — SIGNIFICANT CHANGE UP (ref 40–120)
ALP SERPL-CCNC: 94 U/L — SIGNIFICANT CHANGE UP (ref 40–120)
ALT FLD-CCNC: 56 U/L — HIGH (ref 10–45)
ALT FLD-CCNC: 58 U/L — HIGH (ref 10–45)
ALT FLD-CCNC: 62 U/L — HIGH (ref 10–45)
ANION GAP SERPL CALC-SCNC: 12 MMOL/L — SIGNIFICANT CHANGE UP (ref 5–17)
ANION GAP SERPL CALC-SCNC: 13 MMOL/L — SIGNIFICANT CHANGE UP (ref 5–17)
ANION GAP SERPL CALC-SCNC: 15 MMOL/L — SIGNIFICANT CHANGE UP (ref 5–17)
APTT BLD: 56.4 SEC — HIGH (ref 27.5–35.5)
APTT BLD: 59.3 SEC — HIGH (ref 27.5–35.5)
APTT BLD: 75.5 SEC — HIGH (ref 27.5–35.5)
AST SERPL-CCNC: 30 U/L — SIGNIFICANT CHANGE UP (ref 10–40)
AST SERPL-CCNC: 30 U/L — SIGNIFICANT CHANGE UP (ref 10–40)
AST SERPL-CCNC: 33 U/L — SIGNIFICANT CHANGE UP (ref 10–40)
BASOPHILS # BLD AUTO: 0.11 K/UL — SIGNIFICANT CHANGE UP (ref 0–0.2)
BASOPHILS # BLD AUTO: 0.11 K/UL — SIGNIFICANT CHANGE UP (ref 0–0.2)
BASOPHILS NFR BLD AUTO: 0.9 % — SIGNIFICANT CHANGE UP (ref 0–2)
BASOPHILS NFR BLD AUTO: 1 % — SIGNIFICANT CHANGE UP (ref 0–2)
BILIRUB SERPL-MCNC: 0.5 MG/DL — SIGNIFICANT CHANGE UP (ref 0.2–1.2)
BUN SERPL-MCNC: 24 MG/DL — HIGH (ref 7–23)
BUN SERPL-MCNC: 25 MG/DL — HIGH (ref 7–23)
BUN SERPL-MCNC: 26 MG/DL — HIGH (ref 7–23)
CALCIUM SERPL-MCNC: 8.6 MG/DL — SIGNIFICANT CHANGE UP (ref 8.4–10.5)
CALCIUM SERPL-MCNC: 9.2 MG/DL — SIGNIFICANT CHANGE UP (ref 8.4–10.5)
CALCIUM SERPL-MCNC: 9.5 MG/DL — SIGNIFICANT CHANGE UP (ref 8.4–10.5)
CHLORIDE SERPL-SCNC: 90 MMOL/L — LOW (ref 96–108)
CHLORIDE SERPL-SCNC: 98 MMOL/L — SIGNIFICANT CHANGE UP (ref 96–108)
CHLORIDE SERPL-SCNC: 98 MMOL/L — SIGNIFICANT CHANGE UP (ref 96–108)
CO2 SERPL-SCNC: 18 MMOL/L — LOW (ref 22–31)
CO2 SERPL-SCNC: 19 MMOL/L — LOW (ref 22–31)
CO2 SERPL-SCNC: 20 MMOL/L — LOW (ref 22–31)
CREAT SERPL-MCNC: 1.01 MG/DL — SIGNIFICANT CHANGE UP (ref 0.5–1.3)
CREAT SERPL-MCNC: 1.19 MG/DL — SIGNIFICANT CHANGE UP (ref 0.5–1.3)
CREAT SERPL-MCNC: 1.21 MG/DL — SIGNIFICANT CHANGE UP (ref 0.5–1.3)
EGFR: 67 ML/MIN/1.73M2 — SIGNIFICANT CHANGE UP
EGFR: 68 ML/MIN/1.73M2 — SIGNIFICANT CHANGE UP
EGFR: 83 ML/MIN/1.73M2 — SIGNIFICANT CHANGE UP
EOSINOPHIL # BLD AUTO: 0.64 K/UL — HIGH (ref 0–0.5)
EOSINOPHIL # BLD AUTO: 0.72 K/UL — HIGH (ref 0–0.5)
EOSINOPHIL NFR BLD AUTO: 5.2 % — SIGNIFICANT CHANGE UP (ref 0–6)
EOSINOPHIL NFR BLD AUTO: 6.4 % — HIGH (ref 0–6)
GLUCOSE BLDC GLUCOMTR-MCNC: 115 MG/DL — HIGH (ref 70–99)
GLUCOSE BLDC GLUCOMTR-MCNC: 115 MG/DL — HIGH (ref 70–99)
GLUCOSE BLDC GLUCOMTR-MCNC: 119 MG/DL — HIGH (ref 70–99)
GLUCOSE BLDC GLUCOMTR-MCNC: 131 MG/DL — HIGH (ref 70–99)
GLUCOSE SERPL-MCNC: 130 MG/DL — HIGH (ref 70–99)
GLUCOSE SERPL-MCNC: 134 MG/DL — HIGH (ref 70–99)
GLUCOSE SERPL-MCNC: 478 MG/DL — CRITICAL HIGH (ref 70–99)
HCT VFR BLD CALC: 40.1 % — SIGNIFICANT CHANGE UP (ref 39–50)
HCT VFR BLD CALC: 41.5 % — SIGNIFICANT CHANGE UP (ref 39–50)
HGB BLD-MCNC: 13.3 G/DL — SIGNIFICANT CHANGE UP (ref 13–17)
HGB BLD-MCNC: 13.6 G/DL — SIGNIFICANT CHANGE UP (ref 13–17)
IMM GRANULOCYTES NFR BLD AUTO: 2 % — HIGH (ref 0–1.5)
IMM GRANULOCYTES NFR BLD AUTO: 2.6 % — HIGH (ref 0–1.5)
INR BLD: 1.14 RATIO — SIGNIFICANT CHANGE UP (ref 0.88–1.16)
LACTATE SERPL-SCNC: 1.3 MMOL/L — SIGNIFICANT CHANGE UP (ref 0.7–2)
LYMPHOCYTES # BLD AUTO: 2.44 K/UL — SIGNIFICANT CHANGE UP (ref 1–3.3)
LYMPHOCYTES # BLD AUTO: 2.7 K/UL — SIGNIFICANT CHANGE UP (ref 1–3.3)
LYMPHOCYTES # BLD AUTO: 21.6 % — SIGNIFICANT CHANGE UP (ref 13–44)
LYMPHOCYTES # BLD AUTO: 22 % — SIGNIFICANT CHANGE UP (ref 13–44)
MAGNESIUM SERPL-MCNC: 1.9 MG/DL — SIGNIFICANT CHANGE UP (ref 1.6–2.6)
MAGNESIUM SERPL-MCNC: 2 MG/DL — SIGNIFICANT CHANGE UP (ref 1.6–2.6)
MCHC RBC-ENTMCNC: 29.3 PG — SIGNIFICANT CHANGE UP (ref 27–34)
MCHC RBC-ENTMCNC: 29.8 PG — SIGNIFICANT CHANGE UP (ref 27–34)
MCHC RBC-ENTMCNC: 32.8 GM/DL — SIGNIFICANT CHANGE UP (ref 32–36)
MCHC RBC-ENTMCNC: 33.2 GM/DL — SIGNIFICANT CHANGE UP (ref 32–36)
MCV RBC AUTO: 89.4 FL — SIGNIFICANT CHANGE UP (ref 80–100)
MCV RBC AUTO: 89.7 FL — SIGNIFICANT CHANGE UP (ref 80–100)
MONOCYTES # BLD AUTO: 0.92 K/UL — HIGH (ref 0–0.9)
MONOCYTES # BLD AUTO: 1.02 K/UL — HIGH (ref 0–0.9)
MONOCYTES NFR BLD AUTO: 8.2 % — SIGNIFICANT CHANGE UP (ref 2–14)
MONOCYTES NFR BLD AUTO: 8.3 % — SIGNIFICANT CHANGE UP (ref 2–14)
NEUTROPHILS # BLD AUTO: 6.8 K/UL — SIGNIFICANT CHANGE UP (ref 1.8–7.4)
NEUTROPHILS # BLD AUTO: 7.59 K/UL — HIGH (ref 1.8–7.4)
NEUTROPHILS NFR BLD AUTO: 60.2 % — SIGNIFICANT CHANGE UP (ref 43–77)
NEUTROPHILS NFR BLD AUTO: 61.6 % — SIGNIFICANT CHANGE UP (ref 43–77)
NRBC # BLD: 0 /100 WBCS — SIGNIFICANT CHANGE UP (ref 0–0)
NRBC # BLD: 0 /100 WBCS — SIGNIFICANT CHANGE UP (ref 0–0)
PHOSPHATE SERPL-MCNC: 3.5 MG/DL — SIGNIFICANT CHANGE UP (ref 2.5–4.5)
PHOSPHATE SERPL-MCNC: 6.2 MG/DL — HIGH (ref 2.5–4.5)
PLATELET # BLD AUTO: 406 K/UL — HIGH (ref 150–400)
PLATELET # BLD AUTO: 426 K/UL — HIGH (ref 150–400)
POTASSIUM SERPL-MCNC: 4.2 MMOL/L — SIGNIFICANT CHANGE UP (ref 3.5–5.3)
POTASSIUM SERPL-MCNC: 4.4 MMOL/L — SIGNIFICANT CHANGE UP (ref 3.5–5.3)
POTASSIUM SERPL-MCNC: 4.6 MMOL/L — SIGNIFICANT CHANGE UP (ref 3.5–5.3)
POTASSIUM SERPL-SCNC: 4.2 MMOL/L — SIGNIFICANT CHANGE UP (ref 3.5–5.3)
POTASSIUM SERPL-SCNC: 4.4 MMOL/L — SIGNIFICANT CHANGE UP (ref 3.5–5.3)
POTASSIUM SERPL-SCNC: 4.6 MMOL/L — SIGNIFICANT CHANGE UP (ref 3.5–5.3)
PROT SERPL-MCNC: 6.5 G/DL — SIGNIFICANT CHANGE UP (ref 6–8.3)
PROT SERPL-MCNC: 7.1 G/DL — SIGNIFICANT CHANGE UP (ref 6–8.3)
PROT SERPL-MCNC: 7.3 G/DL — SIGNIFICANT CHANGE UP (ref 6–8.3)
PROTHROM AB SERPL-ACNC: 13.2 SEC — SIGNIFICANT CHANGE UP (ref 10.5–13.4)
RBC # BLD: 4.47 M/UL — SIGNIFICANT CHANGE UP (ref 4.2–5.8)
RBC # BLD: 4.64 M/UL — SIGNIFICANT CHANGE UP (ref 4.2–5.8)
RBC # FLD: 16.4 % — HIGH (ref 10.3–14.5)
RBC # FLD: 16.7 % — HIGH (ref 10.3–14.5)
SODIUM SERPL-SCNC: 123 MMOL/L — LOW (ref 135–145)
SODIUM SERPL-SCNC: 130 MMOL/L — LOW (ref 135–145)
SODIUM SERPL-SCNC: 130 MMOL/L — LOW (ref 135–145)
WBC # BLD: 11.28 K/UL — HIGH (ref 3.8–10.5)
WBC # BLD: 12.3 K/UL — HIGH (ref 3.8–10.5)
WBC # FLD AUTO: 11.28 K/UL — HIGH (ref 3.8–10.5)
WBC # FLD AUTO: 12.3 K/UL — HIGH (ref 3.8–10.5)

## 2022-06-20 PROCEDURE — 99292 CRITICAL CARE ADDL 30 MIN: CPT

## 2022-06-20 PROCEDURE — 99291 CRITICAL CARE FIRST HOUR: CPT | Mod: GC,25

## 2022-06-20 PROCEDURE — 71045 X-RAY EXAM CHEST 1 VIEW: CPT | Mod: 26

## 2022-06-20 PROCEDURE — 99233 SBSQ HOSP IP/OBS HIGH 50: CPT

## 2022-06-20 PROCEDURE — 99292 CRITICAL CARE ADDL 30 MIN: CPT | Mod: GC,25

## 2022-06-20 PROCEDURE — 93010 ELECTROCARDIOGRAM REPORT: CPT

## 2022-06-20 RX ORDER — MAGNESIUM SULFATE 500 MG/ML
1 VIAL (ML) INJECTION ONCE
Refills: 0 | Status: COMPLETED | OUTPATIENT
Start: 2022-06-20 | End: 2022-06-20

## 2022-06-20 RX ADMIN — ISOSORBIDE DINITRATE 40 MILLIGRAM(S): 5 TABLET ORAL at 22:02

## 2022-06-20 RX ADMIN — Medication 100 MILLIGRAM(S): at 14:55

## 2022-06-20 RX ADMIN — Medication 100 MILLIGRAM(S): at 05:08

## 2022-06-20 RX ADMIN — Medication 81 MILLIGRAM(S): at 12:45

## 2022-06-20 RX ADMIN — Medication 100 MILLIGRAM(S): at 12:46

## 2022-06-20 RX ADMIN — Medication 100 MILLIGRAM(S): at 21:01

## 2022-06-20 RX ADMIN — Medication 100 GRAM(S): at 05:07

## 2022-06-20 RX ADMIN — HEPARIN SODIUM 12 UNIT(S)/HR: 5000 INJECTION INTRAVENOUS; SUBCUTANEOUS at 16:12

## 2022-06-20 RX ADMIN — AMIODARONE HYDROCHLORIDE 200 MILLIGRAM(S): 400 TABLET ORAL at 05:09

## 2022-06-20 RX ADMIN — PANTOPRAZOLE SODIUM 40 MILLIGRAM(S): 20 TABLET, DELAYED RELEASE ORAL at 05:08

## 2022-06-20 RX ADMIN — HEPARIN SODIUM 13 UNIT(S)/HR: 5000 INJECTION INTRAVENOUS; SUBCUTANEOUS at 04:23

## 2022-06-20 RX ADMIN — Medication 25 MILLIGRAM(S): at 05:09

## 2022-06-20 RX ADMIN — CHLORHEXIDINE GLUCONATE 1 APPLICATION(S): 213 SOLUTION TOPICAL at 20:37

## 2022-06-20 RX ADMIN — ISOSORBIDE DINITRATE 40 MILLIGRAM(S): 5 TABLET ORAL at 14:55

## 2022-06-20 RX ADMIN — ISOSORBIDE DINITRATE 40 MILLIGRAM(S): 5 TABLET ORAL at 05:08

## 2022-06-20 RX ADMIN — CHLORHEXIDINE GLUCONATE 1 APPLICATION(S): 213 SOLUTION TOPICAL at 05:08

## 2022-06-20 NOTE — PROGRESS NOTE ADULT - NS ATTEND AMEND GEN_ALL_CORE FT
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 He required inotropic support, tMCS, afterload reducing agents and AAD. He underwent eval for advanced therapies and is currently listed as UNOS status 2e.  HE remains critically ill requiring tMCS/milrinone gtt/nipride and amiodarone. Hospital course remarkable for hyponatremia, mildly elevated LFTs and leukocytosis (no clear source of infection).   Will continue close monitoring and ICU care.

## 2022-06-20 NOTE — PROGRESS NOTE ADULT - SUBJECTIVE AND OBJECTIVE BOX
GOCOOL, LENNOX  [id number]  Patient is a 66y old  Male who presents with a chief complaint of LVAD/OHT eval (20 Jun 2022 12:46)    HPI:  66M Mixed Ischemic/NICM Cardiomyopathy (EF 25-30% at baseline, s/p BIV-ICD), CAD (medically managed MIs in 2008,2011, Most recent stent in April 2022 to mLAD) presented with multiple near syncope, found to have 41 episodes of VT and admitted initially to cardiac telemetry for further evaluation/management. Ischemic evaluation without new disease and VT ablation without substrate to complete ablation.   Transferred from [facility] for further management and evaluation for LVAD vs OHT.    (26 May 2022 20:31)    Hospital Course:  5/26/22 Transferred to CCU for further management     24 HOUR EVENTS:    REVIEW OF SYSTEMS:  CONSTITUTIONAL: No weakness, fevers or chills  EYES/ENT: No visual changes;  No vertigo or throat pain   NECK: No pain or stiffness  RESPIRATORY: No cough, wheezing, hemoptysis; No shortness of breath  CARDIOVASCULAR: No chest pain or palpitations  GASTROINTESTINAL: No abdominal or epigastric pain. No nausea, vomiting, or hematemesis; No diarrhea or constipation. No melena or hematochezia.  GENITOURINARY: No dysuria, frequency or hematuria  NEUROLOGICAL: No numbness or weakness  SKIN: No itching, rashes    ICU Vital Signs Last 24 Hrs  T(C): 37.1 (20 Jun 2022 23:00), Max: 37.1 (20 Jun 2022 04:00)  T(F): 98.8 (20 Jun 2022 23:00), Max: 98.8 (20 Jun 2022 04:00)  HR: 81 (20 Jun 2022 21:00) (79 - 108)  RR: 19 (20 Jun 2022 21:00) (18 - 32)  SpO2: 93% (20 Jun 2022 21:00) (82% - 93%)    I&O's Summary    19 Jun 2022 07:01  -  20 Jun 2022 07:00  --------------------------------------------------------  IN: 1259.6 mL / OUT: 2375 mL / NET: -1115.4 mL    20 Jun 2022 07:01  -  20 Jun 2022 22:11  --------------------------------------------------------  IN: 945 mL / OUT: 1700 mL / NET: -755 mL    CAPILLARY BLOOD GLUCOSE  POCT Blood Glucose.: 119 mg/dL (20 Jun 2022 22:07)    PHYSICAL EXAM:  GENERAL: No acute distress, well-developed  HEAD:  Atraumatic, Normocephalic  EYES: EOMI, PERRLA, conjunctiva and sclera clear  NECK: Supple, no lymphadenopathy, no JVD  CHEST/LUNG: CTAB; No wheezes, rales, or rhonchi  HEART: Regular rate and rhythm. Normal S1/S2. No murmurs, rubs, or gallops  ABDOMEN: Soft, non-tender, non-distended; normal bowel sounds, no organomegaly  EXTREMITIES:  2+ peripheral pulses b/l, No clubbing, cyanosis, or edema + R fem IABP   NEUROLOGY: A&O x 3, no focal deficits  SKIN: No rashes or lesions    ============================I/O===========================   I&O's Detail    19 Jun 2022 07:01  -  20 Jun 2022 07:00  --------------------------------------------------------  IN:    Heparin: 280 mL    Milrinone: 239.8 mL    Nitroprusside: 239.8 mL    Oral Fluid: 500 mL  Total IN: 1259.6 mL    OUT:    Voided (mL): 2375 mL  Total OUT: 2375 mL    Total NET: -1115.4 mL    20 Jun 2022 07:01  -  20 Jun 2022 22:11  --------------------------------------------------------  IN:    Heparin: 198 mL    Milrinone: 163.5 mL    Nitroprusside: 163.5 mL    Oral Fluid: 420 mL  Total IN: 945 mL    OUT:    Voided (mL): 1700 mL  Total OUT: 1700 mL    Total NET: -755 mL    ============================ LABS =========================                        13.6   12.30 )-----------( 426      ( 20 Jun 2022 18:23 )             41.5     06-20    130<L>  |  98  |  25<H>  ----------------------------<  130<H>  4.6   |  19<L>  |  1.21    Ca    9.2      20 Jun 2022 18:23  Phos  3.5     06-20  Mg     2.0     06-20    TPro  7.3  /  Alb  3.5  /  TBili  0.5  /  DBili  x   /  AST  33  /  ALT  62<H>  /  AlkPhos  94  06-20      LIVER FUNCTIONS - ( 20 Jun 2022 18:23 )  Alb: 3.5 g/dL / Pro: 7.3 g/dL / ALK PHOS: 94 U/L / ALT: 62 U/L / AST: 33 U/L / GGT: x           PT/INR - ( 20 Jun 2022 03:51 )   PT: 13.2 sec;   INR: 1.14 ratio         PTT - ( 20 Jun 2022 18:23 )  PTT:56.4 sec    Lactate, Blood: 1.3 mmol/L (06-20-22 @ 03:51)  Lactate, Blood: 1.2 mmol/L (06-19-22 @ 04:00)      ======================Micro/Rad/Cardio=================  Telemetry: Reviewed   EKG: Reviewed  CXR: Reviewed  Culture: Reviewed   ======================================================  PAST MEDICAL & SURGICAL HISTORY:  AV block    Essential hypertension    Chronic HFrEF (heart failure with reduced ejection fraction)    Chronic kidney disease, unspecified CKD stage    CAD (coronary artery disease)    HLD (hyperlipidemia)    Type 2 diabetes mellitus    Artificial cardiac pacemaker    ====================ASSESSMENT ==============  66 year old man w/ PMHx HTN, HLD, type 2 DM, chronic HFrEF (EF 25-30% by Echo), complete heart block s/p PPM (in 2006, upgraded to BiV-ICD in 09/2016), CAD (s/p recent BERNABE mid LAD at [facility] on 04/18/2022), and stage II CKD (baseline Cr ~1.3) presented with near syncope to OSH found to have 41 episodes of VT on device, s/p unsuccessful VT ablation and transferred to [facility] for management of cardiogenic shock and advanced therapy eval.     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  - bedside nipride study done 5/29 with reduction in PVR to <2    ====================CARDIOVASCULAR==================  Cardiogenic shock  - Maintain IABP 1:1,monitor PTT on heparin gtt  - Maintain Milrinone 0.5mcg/kg/min  - Cont hydral 100 TID and ISDN 40TID and Nipride gtt for AL Reduction, with goal Assisted mean goal 70-80   - Follow up pending Cyanide level to rule out toxicity from Nitroprusside   - HF following, listed for OHT Status 2E      VTach   - s/p EPS on 5/24, unable to perform ablation as no substrate found and did not induce VT because of severely low EF   - Interrogation of ICD @Dr Wilson's office 5/20: back-to-back episodes of VT @ 200+ bpm all terminating with ATP. Since last cath pt has had 41 VT episodes (all falling into VF zone)  - Normal device function. BIV pacing 97%. No programming changes made  - Cont. Amiodarone 200 QD and Toprol 25 daily  - EP consult 6/15 for atrial tachycardia, prior VT as assessment of amiodarone; device interrogated, no recorded AT/AF or VT/VF. Per EP, no new interventions for slow NSVT.     CAD   - Chest pain free and compliant with DAPT since last PCI 4/18/22 per discussion with interventional at OSH, ok to DC plavix pending cardiac surgery, last dose 5/28  - Cardiac cath @[facility] 4/18/22: mLAD 90% s/p BERNABE, LCx mild disease, RCA mild disease s/p diagnostic cardiac cath w/ Dr Wagner on 05/23/2022   - C/w ASA, d/c'ed lipitor for elevated LFTs, pending open heart transplant    aspirin enteric coated 81 milliGRAM(s) Oral daily  aMIOdarone    Tablet 200 milliGRAM(s) Oral daily  hydrALAZINE 100 milliGRAM(s) Oral every 8 hours  isosorbide   dinitrate Tablet (ISORDIL) 40 milliGRAM(s) Oral three times a day  metoprolol succinate ER 25 milliGRAM(s) Oral daily  milrinone Infusion 0.5 MICROgram(s)/kG/Min (11 mL/Hr) IV Continuous <Continuous>  nitroprusside Infusion 0.25 MICROgram(s)/kG/Min (2.73 mL/Hr) IV Continuous <Continuous>    ===================HEMATOLOGIC/ONC ===================  Secondary Polycythemia Vera   - elevated EPO, hgb in normal range  - as per heme low suspicion for PV  - MELINA-2 wnl    AC therapy   - Heparin gtt for IABP (rate decreased from 13 to 12)  - PTT at goal    heparin  Infusion 1200 Unit(s)/Hr (12 mL/Hr) IV Continuous <Continuous>    ===================== RENAL =========================  JAGRUTI on CKD II  Admitted w/ Cr 2.01 (baseline Cr ~1.3), initial JAGRUTI 2/2 cardiogenic shock given RHC findings of CI 1.88. Improving  - Avoid nephrotoxic agents, NSAIDs. Renally dose meds.  - monitor strict I/Os and continue current cardiac support  - Continue monitoring urine output    Hyponatremia Na stable 128-130  - Urine studies so far c/w possible SIADH vs poor Na intake. Cardiorenal c/s 6/17  - euvolemic/hypovolemic, hypotonic     ==================== GASTROINTESTINAL===================  Elevated Liver Enzymes:  - elevated AST/ALT, normal bilirubin, alk phos. statin d/c 6/13. Downtrending  - Abd US on 6/14: Liver wnl, contracted gallbladder in the setting of recent meal. No acute cholecystitis.    Diet:   - Tolerating PO diet, Constant carb    Constipation:  - Cont. bowel regimen Miralax, Senna, PRN simethicone and bisacodyl   - s/p colonoscopy 6/3 with 5 polyps removed and biopsied, benign findings- GI recs for 3 yr follow up colo  - Protonix for GI prophylaxis    bisacodyl Suppository 10 milliGRAM(s) Rectal daily PRN Constipation  pantoprazole    Tablet 40 milliGRAM(s) Oral before breakfast  polyethylene glycol 3350 17 Gram(s) Oral daily  senna 1 Tablet(s) Oral at bedtime  simethicone 80 milliGRAM(s) Chew every 6 hours PRN Gas    =======================    ENDOCRINE  =====================  T2DM (A1C 6.2 on admission)  - Cont. ISS, glucose controlled, monitor FS closely     Gout flare, Left knee (previously R)  - continue allopurinol, s/p prednisone  - 6/15: with right wrist pain, left knee and left ankle pain possible 2/2 gout flare, xray wrist with findings c/w OA    Bilateral knee pain in setting of osteoarthritis   - analgesia PRN    allopurinol 100 milliGRAM(s) Oral daily  insulin lispro (ADMELOG) corrective regimen sliding scale   SubCutaneous at bedtime  insulin lispro (ADMELOG) corrective regimen sliding scale   SubCutaneous three times a day before meals    ========================INFECTIOUS DISEASE================  UE Thrombophlebitis  - RUE US Duplex showing superficial thrombus  - s/p course of Ancef     No active issues  - afebrile, leukocytosis  - UA neg, BCx on 6/7 NGTD x2, RVP neg   - follow fever curve, WBC   - CXR 6/17 with b/l patchy opacities c/w atelectasis, encourage incentive spirometer       Patient requires continuous monitoring with bedside rhythm monitoring, pulse ox monitoring, and intermittent blood gas analysis. Care plan discussed with ICU care team. Patient remained critical and at risk for life threatening decompensation.  Patient seen, examined and plan discussed with CCU team during rounds.     I have personally provided ____ minutes of critical care time excluding time spent on separate procedures, in addition to initial critical care time provided by the CICU Attending, Dr. Leonardo.     By signing my name below, I, Disha May, attest that this documentation has been prepared under the direction and in the presence of Alisia Manriquez NP   Electronically signed: Licha Love, 06-20-22 @ 22:11    I, Alisia Manriquez NP , personally performed the services described in this documentation. all medical record entries made by the licha were at my direction and in my presence. I have reviewed the chart and agree that the record reflects my personal performance and is accurate and complete  Electronically signed: Alisia Manriquez NP        GOCOOL, LENNOX  MRN-[id number]  Patient is a 66y old  Male who presents with a chief complaint of LVAD/OHT eval (20 Jun 2022 12:46)    HPI: 66M Mixed Ischemic/NICM Cardiomyopathy (EF 25-30% at baseline, s/p BIV-ICD), CAD (medically managed MIs in 2008,2011, Most recent stent in April 2022 to mLAD) presented with multiple near syncope, found to have 41 episodes of VT and admitted initially to cardiac telemetry for further evaluation/management. Ischemic evaluation without new disease and VT ablation without substrate to complete ablation.   Transferred from [facility] for further management and evaluation for LVAD vs OHT.    (26 May 2022 20:31)    Hospital Course:  5/26/22 Transferred to CCU for further management     24 HOUR EVENTS:    REVIEW OF SYSTEMS:  CONSTITUTIONAL: No weakness, fevers or chills  EYES/ENT: No visual changes;  No vertigo or throat pain   NECK: No pain or stiffness  RESPIRATORY: No cough, wheezing, hemoptysis; No shortness of breath  CARDIOVASCULAR: No chest pain or palpitations  GASTROINTESTINAL: No abdominal or epigastric pain. No nausea, vomiting, or hematemesis; No diarrhea or constipation. No melena or hematochezia.  GENITOURINARY: No dysuria, frequency or hematuria  NEUROLOGICAL: No numbness or weakness  SKIN: No itching, rashes    ICU Vital Signs Last 24 Hrs  T(C): 37.1 (20 Jun 2022 23:00), Max: 37.1 (20 Jun 2022 04:00)  T(F): 98.8 (20 Jun 2022 23:00), Max: 98.8 (20 Jun 2022 04:00)  HR: 81 (20 Jun 2022 21:00) (79 - 108)  RR: 19 (20 Jun 2022 21:00) (18 - 32)  SpO2: 93% (20 Jun 2022 21:00) (82% - 93%)    I&O's Summary    19 Jun 2022 07:01  -  20 Jun 2022 07:00  --------------------------------------------------------  IN: 1259.6 mL / OUT: 2375 mL / NET: -1115.4 mL    20 Jun 2022 07:01  -  20 Jun 2022 22:11  --------------------------------------------------------  IN: 945 mL / OUT: 1700 mL / NET: -755 mL    CAPILLARY BLOOD GLUCOSE  POCT Blood Glucose.: 119 mg/dL (20 Jun 2022 22:07)    PHYSICAL EXAM:  GENERAL: No acute distress, well-developed  HEAD:  Atraumatic, Normocephalic  EYES: EOMI, PERRLA, conjunctiva and sclera clear  NECK: Supple, no lymphadenopathy, no JVD  CHEST/LUNG: CTAB; No wheezes, rales, or rhonchi  HEART: Regular rate and rhythm. Normal S1/S2. No murmurs, rubs, or gallops  ABDOMEN: Soft, non-tender, non-distended; normal bowel sounds, no organomegaly  EXTREMITIES:  2+ peripheral pulses b/l, No clubbing, cyanosis, or edema + R fem IABP   NEUROLOGY: A&O x 3, no focal deficits  SKIN: No rashes or lesions    ============================I/O===========================   I&O's Detail    19 Jun 2022 07:01  -  20 Jun 2022 07:00  --------------------------------------------------------  IN:    Heparin: 280 mL    Milrinone: 239.8 mL    Nitroprusside: 239.8 mL    Oral Fluid: 500 mL  Total IN: 1259.6 mL    OUT:    Voided (mL): 2375 mL  Total OUT: 2375 mL    Total NET: -1115.4 mL    20 Jun 2022 07:01  -  20 Jun 2022 22:11  --------------------------------------------------------  IN:    Heparin: 198 mL    Milrinone: 163.5 mL    Nitroprusside: 163.5 mL    Oral Fluid: 420 mL  Total IN: 945 mL    OUT:    Voided (mL): 1700 mL  Total OUT: 1700 mL    Total NET: -755 mL    ============================ LABS =========================                        13.6   12.30 )-----------( 426      ( 20 Jun 2022 18:23 )             41.5     06-20    130<L>  |  98  |  25<H>  ----------------------------<  130<H>  4.6   |  19<L>  |  1.21    Ca    9.2      20 Jun 2022 18:23  Phos  3.5     06-20  Mg     2.0     06-20    TPro  7.3  /  Alb  3.5  /  TBili  0.5  /  DBili  x   /  AST  33  /  ALT  62<H>  /  AlkPhos  94  06-20      LIVER FUNCTIONS - ( 20 Jun 2022 18:23 )  Alb: 3.5 g/dL / Pro: 7.3 g/dL / ALK PHOS: 94 U/L / ALT: 62 U/L / AST: 33 U/L / GGT: x           PT/INR - ( 20 Jun 2022 03:51 )   PT: 13.2 sec;   INR: 1.14 ratio         PTT - ( 20 Jun 2022 18:23 )  PTT:56.4 sec    Lactate, Blood: 1.3 mmol/L (06-20-22 @ 03:51)  Lactate, Blood: 1.2 mmol/L (06-19-22 @ 04:00)      ======================Micro/Rad/Cardio=================  Telemetry: Reviewed   EKG: Reviewed  CXR: Reviewed  Culture: Reviewed   ======================================================  PAST MEDICAL & SURGICAL HISTORY:  AV block    Essential hypertension    Chronic HFrEF (heart failure with reduced ejection fraction)    Chronic kidney disease, unspecified CKD stage    CAD (coronary artery disease)    HLD (hyperlipidemia)    Type 2 diabetes mellitus    Artificial cardiac pacemaker    ====================ASSESSMENT ==============  66 year old man w/ PMHx HTN, HLD, type 2 DM, chronic HFrEF (EF 25-30% by Echo), complete heart block s/p PPM (in 2006, upgraded to BiV-ICD in 09/2016), CAD (s/p recent BERNABE mid LAD at [facility] on 04/18/2022), and stage II CKD (baseline Cr ~1.3) presented with near syncope to OSH found to have 41 episodes of VT on device, s/p unsuccessful VT ablation and transferred to [facility] for management of cardiogenic shock and advanced therapy eval.     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  - bedside nipride study done 5/29 with reduction in PVR to <2    ====================CARDIOVASCULAR==================  Cardiogenic shock  - Maintain IABP 1:1,monitor PTT on heparin gtt  - Maintain Milrinone 0.5mcg/kg/min  - Cont hydral 100 TID and ISDN 40TID and Nipride gtt for AL Reduction, with goal Assisted mean goal 70-80   - Follow up pending Cyanide level to rule out toxicity from Nitroprusside   - HF following, listed for OHT Status 2E      VTach   - s/p EPS on 5/24, unable to perform ablation as no substrate found and did not induce VT because of severely low EF   - Interrogation of ICD @Dr Wilson's office 5/20: back-to-back episodes of VT @ 200+ bpm all terminating with ATP. Since last cath pt has had 41 VT episodes (all falling into VF zone)  - Normal device function. BIV pacing 97%. No programming changes made  - Cont. Amiodarone 200 QD and Toprol 25 daily  - EP consult 6/15 for atrial tachycardia, prior VT as assessment of amiodarone; device interrogated, no recorded AT/AF or VT/VF. Per EP, no new interventions for slow NSVT.     CAD   - Chest pain free and compliant with DAPT since last PCI 4/18/22 per discussion with interventional at OSH, ok to DC plavix pending cardiac surgery, last dose 5/28  - Cardiac cath @[facility] 4/18/22: mLAD 90% s/p BERNABE, LCx mild disease, RCA mild disease s/p diagnostic cardiac cath w/ Dr Wagner on 05/23/2022   - C/w ASA, d/c'ed lipitor for elevated LFTs, pending open heart transplant    aspirin enteric coated 81 milliGRAM(s) Oral daily  aMIOdarone    Tablet 200 milliGRAM(s) Oral daily  hydrALAZINE 100 milliGRAM(s) Oral every 8 hours  isosorbide   dinitrate Tablet (ISORDIL) 40 milliGRAM(s) Oral three times a day  metoprolol succinate ER 25 milliGRAM(s) Oral daily  milrinone Infusion 0.5 MICROgram(s)/kG/Min (11 mL/Hr) IV Continuous <Continuous>  nitroprusside Infusion 0.25 MICROgram(s)/kG/Min (2.73 mL/Hr) IV Continuous <Continuous>    ===================HEMATOLOGIC/ONC ===================  Secondary Polycythemia Vera   - elevated EPO, hgb in normal range  - as per heme low suspicion for PV  - MELINA-2 wnl    AC therapy   - Heparin gtt for IABP (rate decreased from 13 to 12)  - PTT at goal    heparin  Infusion 1200 Unit(s)/Hr (12 mL/Hr) IV Continuous <Continuous>    ===================== RENAL =========================  JAGRUTI on CKD II  Admitted w/ Cr 2.01 (baseline Cr ~1.3), initial JAGRUTI 2/2 cardiogenic shock given RHC findings of CI 1.88. Improving  - Avoid nephrotoxic agents, NSAIDs. Renally dose meds.  - monitor strict I/Os and continue current cardiac support  - Continue monitoring urine output    Hyponatremia Na stable 128-130  - Urine studies so far c/w possible SIADH vs poor Na intake. Cardiorenal c/s 6/17  - euvolemic/hypovolemic, hypotonic     ==================== GASTROINTESTINAL===================  Elevated Liver Enzymes:  - elevated AST/ALT, normal bilirubin, alk phos. statin d/c 6/13. Downtrending  - Abd US on 6/14: Liver wnl, contracted gallbladder in the setting of recent meal. No acute cholecystitis.    Diet:   - Tolerating PO diet, Constant carb    Constipation:  - Cont. bowel regimen Miralax, Senna, PRN simethicone and bisacodyl   - s/p colonoscopy 6/3 with 5 polyps removed and biopsied, benign findings- GI recs for 3 yr follow up colo  - Protonix for GI prophylaxis    bisacodyl Suppository 10 milliGRAM(s) Rectal daily PRN Constipation  pantoprazole    Tablet 40 milliGRAM(s) Oral before breakfast  polyethylene glycol 3350 17 Gram(s) Oral daily  senna 1 Tablet(s) Oral at bedtime  simethicone 80 milliGRAM(s) Chew every 6 hours PRN Gas    =======================    ENDOCRINE  =====================  T2DM (A1C 6.2 on admission)  - Cont. ISS, glucose controlled, monitor FS closely     Gout flare, Left knee (previously R)  - continue allopurinol, s/p prednisone  - 6/15: with right wrist pain, left knee and left ankle pain possible 2/2 gout flare, xray wrist with findings c/w OA    Bilateral knee pain in setting of osteoarthritis   - analgesia PRN    allopurinol 100 milliGRAM(s) Oral daily  insulin lispro (ADMELOG) corrective regimen sliding scale   SubCutaneous at bedtime  insulin lispro (ADMELOG) corrective regimen sliding scale   SubCutaneous three times a day before meals    ========================INFECTIOUS DISEASE================  UE Thrombophlebitis  - RUE US Duplex showing superficial thrombus  - s/p course of Ancef     No active issues  - afebrile, leukocytosis  - UA neg, BCx on 6/7 NGTD x2, RVP neg   - follow fever curve, WBC   - CXR 6/17 with b/l patchy opacities c/w atelectasis, encourage incentive spirometer       Patient requires continuous monitoring with bedside rhythm monitoring, pulse ox monitoring, and intermittent blood gas analysis. Care plan discussed with ICU care team. Patient remained critical and at risk for life threatening decompensation.  Patient seen, examined and plan discussed with CCU team during rounds.     I have personally provided 30 minutes of critical care time excluding time spent on separate procedures, in addition to initial critical care time provided by the CICU Attending, Dr. Leonardo.     By signing my name below, I, Disha May, attest that this documentation has been prepared under the direction and in the presence of Alisia Manriquez NP   Electronically signed: Licha Love, 06-20-22 @ 22:11    I, Alisia Manriquez NP , personally performed the services described in this documentation. all medical record entries made by the licha were at my direction and in my presence. I have reviewed the chart and agree that the record reflects my personal performance and is accurate and complete  Electronically signed: Alisia Manriquez NP

## 2022-06-20 NOTE — PROGRESS NOTE ADULT - SUBJECTIVE AND OBJECTIVE BOX
Patient is a 66y old  Male who presents with a chief complaint of LVAD/OHT eval (2022 19:39)    INTERVAL HISTORYOVERNIGHT EVENTS:  -No acute events    SUBJECTIVE  - Patient seen and evaluated at bedside  - Patient reports presence of   - Patient reports absence of fevers, chills, HA, lighted headedness, dizziness, nausea, emesis, chest pain, dyspnea, palpitations, abd pain, diarrhea, urinary symptoms, skin color changes or rashes, or LE edema     MEDICATIONS:  MEDICATIONS  (STANDING):  allopurinol 100 milliGRAM(s) Oral daily  aMIOdarone    Tablet 200 milliGRAM(s) Oral daily  aspirin enteric coated 81 milliGRAM(s) Oral daily  chlorhexidine 2% Cloths 1 Application(s) Topical daily  chlorhexidine 4% Liquid 1 Application(s) Topical <User Schedule>  heparin  Infusion 1200 Unit(s)/Hr (13 mL/Hr) IV Continuous <Continuous>  hydrALAZINE 100 milliGRAM(s) Oral every 8 hours  insulin lispro (ADMELOG) corrective regimen sliding scale   SubCutaneous at bedtime  insulin lispro (ADMELOG) corrective regimen sliding scale   SubCutaneous three times a day before meals  isosorbide   dinitrate Tablet (ISORDIL) 40 milliGRAM(s) Oral three times a day  metoprolol succinate ER 25 milliGRAM(s) Oral daily  milrinone Infusion 0.5 MICROgram(s)/kG/Min (11 mL/Hr) IV Continuous <Continuous>  nitroprusside Infusion 0.25 MICROgram(s)/kG/Min (2.73 mL/Hr) IV Continuous <Continuous>  pantoprazole    Tablet 40 milliGRAM(s) Oral before breakfast  polyethylene glycol 3350 17 Gram(s) Oral daily  senna 1 Tablet(s) Oral at bedtime    MEDICATIONS  (PRN):  bisacodyl Suppository 10 milliGRAM(s) Rectal daily PRN Constipation  simethicone 80 milliGRAM(s) Chew every 6 hours PRN Gas  sodium chloride 0.65% Nasal 1 Spray(s) Both Nostrils every 6 hours PRN Nasal Congestion      ALLERGIES:  Allergies    No Known Allergies    Intolerances        OBJECTIVE:  ICU Vital Signs Last 24 Hrs  T(C): 37.1 (2022 04:00), Max: 37.1 (2022 12:00)  T(F): 98.8 (2022 04:00), Max: 98.8 (2022 12:00)  HR: 99 (2022 07:00) (80 - 104)  BP: --  BP(mean): --  ABP: --  ABP(mean): --  RR: 21 (2022 07:00) (18 - 28)  SpO2: 91% (2022 07:00) (90% - 92%)      Adult Advanced Hemodynamics Last 24 Hrs  CVP(mm Hg): --  CVP(cm H2O): --  CO: --  CI: --  PA: --  PA(mean): --  PCWP: --  SVR: --  SVRI: --  PVR: --  PVRI: --  CAPILLARY BLOOD GLUCOSE      POCT Blood Glucose.: 131 mg/dL (2022 04:30)  POCT Blood Glucose.: 121 mg/dL (2022 20:09)  POCT Blood Glucose.: 116 mg/dL (2022 17:00)    CAPILLARY BLOOD GLUCOSE      POCT Blood Glucose.: 131 mg/dL (2022 04:30)    I&O's Summary    2022 07:01  -  2022 07:00  --------------------------------------------------------  IN: 1259.6 mL / OUT: 2375 mL / NET: -1115.4 mL      Daily     Daily Weight in k.1 (2022 05:00)    PHYSICAL EXAM:  General: NAD, well-groomed, well-developed  Eyes: Conjunctiva and sclera clear  ENMT: Moist mucous membranes  Neck: No palpable pre-auricular, post-auricular, occipital, mandibular, submental, supra-clavicular, or infra-clavicular lymph nodes   Chest: Clear to auscultation bilaterally; no rales, rhonchi, or wheezing  Heart: Regular rate and rhythm; normal S1 and S2; no murmurs, rubs, or gallops  Abd: Soft, nontender, nondistended  Nervous System: AAOX3  Psych: Appropriate affect   Ext: no peripheral LE edema bilaterally  Lines/Tubes: IV peripheral    LABS:                          13.3   11.28 )-----------( 406      ( 2022 03:51 )             40.1     06-20    123<L>  |  90<L>  |  24<H>  ----------------------------<  478<HH>  4.2   |  18<L>  |  1.01    Ca    8.6      2022 03:51  Phos  6.2     06-20  Mg     1.9     06-20    TPro  6.5  /  Alb  3.1<L>  /  TBili  0.5  /  DBili  x   /  AST  30  /  ALT  56<H>  /  AlkPhos  81  06-20    LIVER FUNCTIONS - ( 2022 03:51 )  Alb: 3.1 g/dL / Pro: 6.5 g/dL / ALK PHOS: 81 U/L / ALT: 56 U/L / AST: 30 U/L / GGT: x           PT/INR - ( 2022 03:51 )   PT: 13.2 sec;   INR: 1.14 ratio         PTT - ( 2022 03:51 )  PTT:59.3 sec              RADIOLOGY & ADDITIONAL TESTS:    Consultant(s) Notes Reviewed:  Yes    Care Discussed with Consultants/Other Providers [ x] YES  [ ] NO   Patient is a 66y old  Male who presents with a chief complaint of LVAD/OHT eval (2022 19:39)    INTERVAL HISTORYOVERNIGHT EVENTS:  -No acute events    SUBJECTIVE  - Patient seen and evaluated at bedside  - Patient reports feeling well  - Patient reports absence of fevers, visual changes, nausea, chest pain, dyspnea, palpitations, abd pain, urinary symptoms, or LE edema     MEDICATIONS:  MEDICATIONS  (STANDING):  allopurinol 100 milliGRAM(s) Oral daily  aMIOdarone    Tablet 200 milliGRAM(s) Oral daily  aspirin enteric coated 81 milliGRAM(s) Oral daily  chlorhexidine 2% Cloths 1 Application(s) Topical daily  chlorhexidine 4% Liquid 1 Application(s) Topical <User Schedule>  heparin  Infusion 1200 Unit(s)/Hr (13 mL/Hr) IV Continuous <Continuous>  hydrALAZINE 100 milliGRAM(s) Oral every 8 hours  insulin lispro (ADMELOG) corrective regimen sliding scale   SubCutaneous at bedtime  insulin lispro (ADMELOG) corrective regimen sliding scale   SubCutaneous three times a day before meals  isosorbide   dinitrate Tablet (ISORDIL) 40 milliGRAM(s) Oral three times a day  metoprolol succinate ER 25 milliGRAM(s) Oral daily  milrinone Infusion 0.5 MICROgram(s)/kG/Min (11 mL/Hr) IV Continuous <Continuous>  nitroprusside Infusion 0.25 MICROgram(s)/kG/Min (2.73 mL/Hr) IV Continuous <Continuous>  pantoprazole    Tablet 40 milliGRAM(s) Oral before breakfast  polyethylene glycol 3350 17 Gram(s) Oral daily  senna 1 Tablet(s) Oral at bedtime    MEDICATIONS  (PRN):  bisacodyl Suppository 10 milliGRAM(s) Rectal daily PRN Constipation  simethicone 80 milliGRAM(s) Chew every 6 hours PRN Gas  sodium chloride 0.65% Nasal 1 Spray(s) Both Nostrils every 6 hours PRN Nasal Congestion      ALLERGIES:  Allergies    No Known Allergies    Intolerances  OBJECTIVE:  ICU Vital Signs Last 24 Hrs  T(C): 37.1 (2022 04:00), Max: 37.1 (2022 12:00)  T(F): 98.8 (2022 04:00), Max: 98.8 (2022 12:00)  HR: 99 (2022 07:00) (80 - 104)  RR: 21 (2022 07:00) (18 - 28)  SpO2: 91% (2022 07:00) (90% - 92%)    CAPILLARY BLOOD GLUCOSE  POCT Blood Glucose.: 131 mg/dL (2022 04:30)  POCT Blood Glucose.: 121 mg/dL (2022 20:09)  POCT Blood Glucose.: 116 mg/dL (2022 17:00)    CAPILLARY BLOOD GLUCOSE  POCT Blood Glucose.: 131 mg/dL (2022 04:30)    I&O's Summary    2022 07:01  -  2022 07:00  --------------------------------------------------------  IN: 1259.6 mL / OUT: 2375 mL / NET: -1115.4 mL    Daily     Daily Weight in k.1 (2022 05:00)    PHYSICAL EXAM:  General: NAD  Eyes: Sclera clear  Neck: No palpable pre-auricular, post-auricular, occipital, mandibular, submental, supra-clavicular, or infra-clavicular lymph nodes   Chest: Clear to auscultation bilaterally; no rales or wheezing  Heart: IABP in place; intact S1 and S2; no murmurs, rubs, or gallops appreciated  Abd: Soft, nontender, nondistended  Nervous System: Alert and oriented to situation  Psych: Appropriate affect   Ext: no peripheral LE edema bilaterally    LABS:                          13.3   11.28 )-----------( 406      ( 2022 03:51 )             40.1     06-20    123<L>  |  90<L>  |  24<H>  ----------------------------<  478<HH>  4.2   |  18<L>  |  1.01    Ca    8.6      2022 03:51  Phos  6.2     06-20  Mg     1.9     06-20    TPro  6.5  /  Alb  3.1<L>  /  TBili  0.5  /  DBili  x   /  AST  30  /  ALT  56<H>  /  AlkPhos  81  06-20    LIVER FUNCTIONS - ( 2022 03:51 )  Alb: 3.1 g/dL / Pro: 6.5 g/dL / ALK PHOS: 81 U/L / ALT: 56 U/L / AST: 30 U/L / GGT: x           PT/INR - ( 2022 03:51 )   PT: 13.2 sec;   INR: 1.14 ratio         PTT - ( 2022 03:51 )  PTT:59.3 sec    RADIOLOGY & ADDITIONAL TESTS:  < from: Xray Chest 1 View- PORTABLE-Urgent (Xray Chest 1 View- PORTABLE-Urgent .) (22 @ 11:26) >  IMPRESSION:  Devices: IABP at the aortic arch. S/p defibrillator.  Heart/Vascular: Cardiomegaly  Pulmonary: Clear    < end of copied text >    Consultant(s) Notes Reviewed:  Yes    Care Discussed with Consultants/Other Providers [ x] YES  [ ] NO

## 2022-06-20 NOTE — PROGRESS NOTE ADULT - PROBLEM SELECTOR PLAN 5
- hx of VT s/p unsuccessful VT ablation  - continue on amio 200 mg PO QD  - since being admitted to [facility] has not had any episodes of VT, currently tolerating high dose milrinone.  - paced at 80 on telemetry with few PVCs

## 2022-06-20 NOTE — PROGRESS NOTE ADULT - SUBJECTIVE AND OBJECTIVE BOX
Subjective:  - no events overnight  - denies complaints, specifically SOB, orthopnea, pain    Medications:  allopurinol 100 milliGRAM(s) Oral daily  aMIOdarone    Tablet 200 milliGRAM(s) Oral daily  aspirin enteric coated 81 milliGRAM(s) Oral daily  bisacodyl Suppository 10 milliGRAM(s) Rectal daily PRN  chlorhexidine 2% Cloths 1 Application(s) Topical daily  chlorhexidine 4% Liquid 1 Application(s) Topical <User Schedule>  heparin  Infusion 1200 Unit(s)/Hr IV Continuous <Continuous>  hydrALAZINE 100 milliGRAM(s) Oral every 8 hours  insulin lispro (ADMELOG) corrective regimen sliding scale   SubCutaneous at bedtime  insulin lispro (ADMELOG) corrective regimen sliding scale   SubCutaneous three times a day before meals  isosorbide   dinitrate Tablet (ISORDIL) 40 milliGRAM(s) Oral three times a day  metoprolol succinate ER 25 milliGRAM(s) Oral daily  milrinone Infusion 0.5 MICROgram(s)/kG/Min IV Continuous <Continuous>  nitroprusside Infusion 0.25 MICROgram(s)/kG/Min IV Continuous <Continuous>  pantoprazole    Tablet 40 milliGRAM(s) Oral before breakfast  polyethylene glycol 3350 17 Gram(s) Oral daily  senna 1 Tablet(s) Oral at bedtime  simethicone 80 milliGRAM(s) Chew every 6 hours PRN  sodium chloride 0.65% Nasal 1 Spray(s) Both Nostrils every 6 hours PRN      Physical Exam:    Vitals:  Vital Signs Last 24 Hours  T(C): 37.1 (22 @ 07:01), Max: 37.1 (22 @ 16:00)  HR: 80 (22 @ 12:00) (79 - 108)  BP: IABP 1:1 means 76-84, aug diastolics   RR: 19 (22 @ 12:00) (18 - 29)  SpO2: 88% (22 @ 11:00) (82% - 92%)    Weight in k.1 ( @ 05:00)    I&O's Summary    2022 07:01  -  2022 07:00  --------------------------------------------------------  IN: 1259.6 mL / OUT: 2375 mL / NET: -1115.4 mL    2022 07:01  -  2022 12:47  --------------------------------------------------------  IN: 422.2 mL / OUT: 650 mL / NET: -227.8 mL    Tele: AV paced, V paced    General: No distress. Comfortable.  HEENT: EOM intact.  Neck: Neck supple. JVP not elevated. No masses  Chest: Clear to auscultation bilaterally  CV: Normal S1 and S2. No murmurs, rub, or gallops. Radial pulses normal. palpable DP pulses. No LE edema   Abdomen: Soft, non-distended, non-tender  Skin: No rashes or skin breakdown. R fem IABP insertion site soft, without hematoma or drainage  Neurology: Alert and oriented times three. Sensation intact  Psych: Affect normal    Labs:                        13.3   11.28 )-----------( 406      ( 2022 03:51 )             40.1     06-20    130<L>  |  98  |  26<H>  ----------------------------<  134<H>  4.4   |  20<L>  |  1.19    Ca    9.5      2022 06:51  Phos  6.2     06-20  Mg     1.9     20    TPro  7.1  /  Alb  3.4  /  TBili  0.5  /  DBili  x   /  AST  30  /  ALT  58<H>  /  AlkPhos  87  06-20    PT/INR - ( 2022 03:51 )   PT: 13.2 sec;   INR: 1.14 ratio      PTT - ( 2022 10:44 )  PTT:75.5 sec    Lactate, Blood: 1.3 mmol/L ( @ 03:51)  Lactate, Blood: 1.2 mmol/L ( @ 04:00)

## 2022-06-20 NOTE — PROGRESS NOTE ADULT - PROBLEM SELECTOR PLAN 6
- currently normal, continue to monitor  - upon arrival was noted to have JAGRUTI, likely related to low cardiac output  - mild rise in Cr earlier last week in setting of hypovolemia, now normalized with albumin/increase PO fluid intake

## 2022-06-20 NOTE — PROGRESS NOTE ADULT - ASSESSMENT
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 LHC did not reveal new obstructive CAD and he underwent EPS which did not reveal endocardial substrate amenable for ablation. RHC revealed severely depressed cardiac output and he was transferred to [facility] 5/26 for advanced therapies evaluation.    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 He improved significantly with sequential uptitration of inotropes and increased pacing rate, but on 6/6 he had worsening JAGRUTI. He is s/p RHC which revealed severely elevated filling pressures, severe cpcPH, and CI of 1.9 on milrinone 0.5. IABP was placed and his renal function/PAPs have improved. He is MCS dependent and was inadequately supported with high dose inotropes, so was listed UNOS status 2e for OHT, awaiting suitable donor. However, was made status 7 as he became febrile, last 6/7 T 38. He has been afebrile since and blood cultures from 6/7 with NGTD x 48 hours and his leukocytosis has not worsened. Discussed with transplant ID and since bld cx negative x48hrs he has been re-listed UNOS status 2e.     He had a mild rise in his Cr last week that has improved with gentle hydration and has remained WNL. Stable hyponatremia and mild leukocytosis. He's been afebrile. Overall currently stable awaiting suitable donor.     Review of studies  TTE 5/21: LV 5.2 cm, LVEF 10-15%, LVOT VTI 10 cm, moderate RV dysfunction, mild AI, minimal MR  EKG: a-BiV paced  [facility] 5/23: patent mLAD stent with slow flow, D1 with 40-50% stenosis, mild disease otherwise  [facility] 5/26: RA 12, PA 70/40 (50), PCWP 22, Milana CO/CI 2.3/1.3, MAP 83 with SVR 2469, PVR 12 PERSAUD    Hemodynamics  5/27 (milrinone 0.25): RA 10, PA 76/35 (49), PCWP 34, PA sat 57% with Milana CO/CI 3.1/1.6, MAP 71 with SVR 1574, PVR 4.8  5/28 (on milrinone 0.375): RA 7, PA 63/31, PCWP 26, PA sat 72.8% with Milana CO/CI 4.9/2.5 (CI later dropped to 1.6 in the afternoon), MAP 70 with SVR 1039, PVR 2.9  5/29 (on milrinone 0.5): RA 8, PA 75/32 (50), PCWP 28, PA sat 74% with Milana CO/CI 5.0/2.6, MAP 77 with SVR 1200, PVR 4.4  5/29 (on milrinone 0.5 and nipride to 3 mcg/kg/min): RA 6, PA 59/31 (40), PCWP 30, PA sat 79% with Milana CO/CI 7.0/3.6, BP 97/57 (MAP 70) with , PVR 1.4  6/6 RHC (mil 0.5): RA 11 (v13), RV 75/17, PA 80/36/52, PCWP 24 (v29), PA sat 59.5%, CO/CI (F) 3.58/1.88  6/7 (mil 0.5, IABP 1:1): CVP 5, PA 66/32/45, PA sat 65.7%, CO/CI (F) 4.0/2.1, SVR 2000  6/8 (mil 0.5, IABP 1:1): CVP 1, PA 46/19/28, PA sat 72.7%, CO/CI (F) 5.6/2.9, SVR 1257  6/8 PM (mil 0.5, IABP 1:1): CVP 4, PA 68/25/38, PA sat 68%, CO/CI (f) 5/2.6, SVR 1326

## 2022-06-20 NOTE — PROGRESS NOTE ADULT - ASSESSMENT
====================ASSESSMENT ==============  66 year old man w/ PMHx HTN, HLD, type 2 DM, chronic HFrEF (EF 25-30% by Echo), complete heart block s/p PPM (in 2006, upgraded to BiV-ICD in 09/2016), CAD (s/p recent BERNABE mid LAD at [facility] on 04/18/2022), and stage II CKD (baseline Cr ~1.3) presented with near syncope to OSH found to have 41 episodes of VT on device, s/p unsuccessful VT ablation and transferred to [facility] for management of cardiogenic shock and advanced therapy eval.       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  - bedside nipride study done 5/29 with reduction in PVR to <2    ====================CARDIOVASCULAR==================  Cardiogenic shock  - Maintain IABP 1:1,monitor PTT on heparin gtt  - Maintain Milrinone 0.5mcg/kg/min  - Cont hydral 100 TID and ISDN 40TID and Nipride gtt for AL Reduction, with goal Assisted mean goal 70-80   - Follow up Cynaide level   - HF following, listed for OHT Status 2E      VTach   - s/p EPS on 5/24, unable to perform ablation as no substrate found and did not induce VT because of severely low EF   - Interrogation of ICD @Dr Wilson's office 5/20: back-to-back episodes of VT @ 200+ bpm all terminating with ATP. Since last cath pt has had 41 VT episodes (all falling into VF zone)  - Normal device function. BIV pacing 97%. No programming changes made  - Cont. Amiodarone 200 QD and Toprol 25 daily  - EP consult 6/15 for atrial tachycardia, prior VT as assessment of amiodarone; device interrogated, no recorded AT/AF or VT/VF. Per EP, no new interventions for slow NSVT. c/w amiodarone and toprol.    CAD   - Chest pain free and compliant with DAPT since last PCI 4/18/22 per discussion with interventional at OSH, ok to DC plavix pending cardiac surgery, last dose 5/28  - Cardiac cath @[facility] 4/18/22: mLAD 90% s/p BERNABE, LCx mild disease, RCA mild disease s/p diagnostic cardiac cath w/ Dr Wagner on 05/23/2022   - C/w ASA, d/c'ed lipitor for elevated LFTs, pending transplant    ===================HEMATOLOGIC/ONC ===================  ?Secondary Polycythemia Vera   - elevated EPO, hgb in normal range  - as per heme low suspicion for PV  - MELINA-2 wnl    AC therapy   - Heparin gtt for IABP    ===================== RENAL =========================  JAGRUTI on CKD II  Admitted w/ Cr 2.01 (baseline Cr ~1.3), initial JAGRUTI 2/2 cardiogenic shock given RHC findings of CI 1.88. Improving  - Avoid nephrotoxic agents, NSAIDs. Renally dose meds.  - monitor strict I/Os and continue current cardiac support  - Continue monitoring urine output    Hyponatremia Na stable 128-130  - Urine studies so far c/w possible SIADH vs poor Na intake. Cardiorenal c/s 6/17  - euvolemic/hypovolemic, hypotonic     ==================== GASTROINTESTINAL===================  Elevated Liver Enzymes:  - elevated AST/ALT, normal bilirubin, alk phos. statin d/c 6/13. Downtrending  - Abd US on 6/14: Liver wnl, contracted gallbladder in the setting of recent meal. No acute cholecystitis.    Diet:   - Tolerating PO diet, Constant carb    Constipation:  - Cont. bowel regimen Miralax, Senna, PRN simethicone and bisacodyl   - s/p colonoscopy 6/3 with 5 polyps removed and biopsied, benign findings- GI recs for 3 yr follow up colo  - Protonix for GI prophylaxis    =======================    ENDOCRINE  =====================  T2DM (A1C 6.2 on admission)  - Cont. ISS, glucose controlled, monitor FS closely     Gout flare, Left knee (previously R)  - continue allopurinol, s/p prednisone  - 6/15: with right wrist pain, left knee and left ankle pain possible 2/2 gout flare, xray wrist with findings c/w OA    Bilateral knee pain in setting of osteoarthritis   - Tylenol PRN     ========================INFECTIOUS DISEASE================  UE Thrombophlebitis  - RUE US Duplex showing superficial thrombus  - s/p course of Ancef     No active issues  - afebrile, mild leukocytosis  - UA neg, BCx on 6/7 NGTD x2, RVP neg   - follow fever curve, WBC   - CXR 6/17 with b/l patchy opacities c/w atelectasis, encourage incentive spirometer  ====================ASSESSMENT ==============  66 year old man w/ PMHx HTN, HLD, type 2 DM, chronic HFrEF (EF 25-30% by Echo), complete heart block s/p PPM (in 2006, upgraded to BiV-ICD in 09/2016), CAD (s/p recent BERNABE mid LAD at [facility] on 04/18/2022), and stage II CKD (baseline Cr ~1.3) presented with near syncope to OSH found to have 41 episodes of VT on device, s/p unsuccessful VT ablation and transferred to [facility] for management of cardiogenic shock and advanced therapy eval.       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  - bedside nipride study done 5/29 with reduction in PVR to <2    ====================CARDIOVASCULAR==================  Cardiogenic shock  - Maintain IABP 1:1,monitor PTT on heparin gtt  - Maintain Milrinone 0.5mcg/kg/min  - Cont hydral 100 TID and ISDN 40TID and Nipride gtt for AL Reduction, with goal Assisted mean goal 70-80   - Follow up pending Cyanide level to rule out toxicity from Nitroprusside   - HF following, listed for OHT Status 2E      VTach   - s/p EPS on 5/24, unable to perform ablation as no substrate found and did not induce VT because of severely low EF   - Interrogation of ICD @Dr Wilson's office 5/20: back-to-back episodes of VT @ 200+ bpm all terminating with ATP. Since last cath pt has had 41 VT episodes (all falling into VF zone)  - Normal device function. BIV pacing 97%. No programming changes made  - Cont. Amiodarone 200 QD and Toprol 25 daily  - EP consult 6/15 for atrial tachycardia, prior VT as assessment of amiodarone; device interrogated, no recorded AT/AF or VT/VF. Per EP, no new interventions for slow NSVT.   -c/w amiodarone and toprol.    CAD   - Chest pain free and compliant with DAPT since last PCI 4/18/22 per discussion with interventional at OSH, ok to DC plavix pending cardiac surgery, last dose 5/28  - Cardiac cath @[facility] 4/18/22: mLAD 90% s/p BERNABE, LCx mild disease, RCA mild disease s/p diagnostic cardiac cath w/ Dr Wagner on 05/23/2022   - C/w ASA, d/c'ed lipitor for elevated LFTs, pending open heart transplant    ===================HEMATOLOGIC/ONC ===================  ?Secondary Polycythemia Vera   - elevated EPO, hgb in normal range  - as per heme low suspicion for PV  - MELINA-2 wnl    AC therapy   - Heparin gtt for IABP (rate decreased from 13 to 12)  - Follow up PTT at ~7pm    ===================== RENAL =========================  JAGRUTI on CKD II  Admitted w/ Cr 2.01 (baseline Cr ~1.3), initial JAGRUTI 2/2 cardiogenic shock given RHC findings of CI 1.88. Improving  - Avoid nephrotoxic agents, NSAIDs. Renally dose meds.  - monitor strict I/Os and continue current cardiac support  - Continue monitoring urine output    Hyponatremia Na stable 128-130  - Urine studies so far c/w possible SIADH vs poor Na intake. Cardiorenal c/s 6/17  - euvolemic/hypovolemic, hypotonic     ==================== GASTROINTESTINAL===================  Elevated Liver Enzymes:  - elevated AST/ALT, normal bilirubin, alk phos. statin d/c 6/13. Downtrending  - Abd US on 6/14: Liver wnl, contracted gallbladder in the setting of recent meal. No acute cholecystitis.    Diet:   - Tolerating PO diet, Constant carb    Constipation:  - Cont. bowel regimen Miralax, Senna, PRN simethicone and bisacodyl   - s/p colonoscopy 6/3 with 5 polyps removed and biopsied, benign findings- GI recs for 3 yr follow up colo  - Protonix for GI prophylaxis    =======================    ENDOCRINE  =====================  T2DM (A1C 6.2 on admission)  - Cont. ISS, glucose controlled, monitor FS closely     Gout flare, Left knee (previously R)  - continue allopurinol, s/p prednisone  - 6/15: with right wrist pain, left knee and left ankle pain possible 2/2 gout flare, xray wrist with findings c/w OA    Bilateral knee pain in setting of osteoarthritis   - Tylenol PRN     ========================INFECTIOUS DISEASE================  UE Thrombophlebitis  - RUE US Duplex showing superficial thrombus  - s/p course of Ancef     No active issues  - afebrile, mild leukocytosis  - UA neg, BCx on 6/7 NGTD x2, RVP neg   - follow fever curve, WBC   - CXR 6/17 with b/l patchy opacities c/w atelectasis, encourage incentive spirometer

## 2022-06-21 ENCOUNTER — TRANSCRIPTION ENCOUNTER (OUTPATIENT)
Age: 67
End: 2022-06-21

## 2022-06-21 ENCOUNTER — APPOINTMENT (OUTPATIENT)
Dept: CARDIOTHORACIC SURGERY | Facility: HOSPITAL | Age: 67
End: 2022-06-21

## 2022-06-21 ENCOUNTER — RESULT REVIEW (OUTPATIENT)
Age: 67
End: 2022-06-21

## 2022-06-21 LAB
ALBUMIN SERPL ELPH-MCNC: 3.1 G/DL — LOW (ref 3.3–5)
ALBUMIN SERPL ELPH-MCNC: 4.6 G/DL — SIGNIFICANT CHANGE UP (ref 3.3–5)
ALP SERPL-CCNC: 43 U/L — SIGNIFICANT CHANGE UP (ref 40–120)
ALP SERPL-CCNC: 90 U/L — SIGNIFICANT CHANGE UP (ref 40–120)
ALT FLD-CCNC: 47 U/L — HIGH (ref 10–45)
ALT FLD-CCNC: 58 U/L — HIGH (ref 10–45)
ANION GAP SERPL CALC-SCNC: 11 MMOL/L — SIGNIFICANT CHANGE UP (ref 5–17)
ANION GAP SERPL CALC-SCNC: 25 MMOL/L — HIGH (ref 5–17)
APTT BLD: 121.1 SEC — CRITICAL HIGH (ref 27.5–35.5)
APTT BLD: 30.3 SEC — SIGNIFICANT CHANGE UP (ref 27.5–35.5)
APTT BLD: 43.3 SEC — HIGH (ref 27.5–35.5)
AST SERPL-CCNC: 153 U/L — HIGH (ref 10–40)
AST SERPL-CCNC: 31 U/L — SIGNIFICANT CHANGE UP (ref 10–40)
BASE EXCESS BLDMV CALC-SCNC: -10.1 MMOL/L — LOW (ref -3–3)
BASE EXCESS BLDMV CALC-SCNC: -3.9 MMOL/L — LOW (ref -3–3)
BASE EXCESS BLDV CALC-SCNC: -0.2 MMOL/L — SIGNIFICANT CHANGE UP (ref -2–2)
BASE EXCESS BLDV CALC-SCNC: -0.4 MMOL/L — SIGNIFICANT CHANGE UP (ref -2–2)
BASE EXCESS BLDV CALC-SCNC: -0.7 MMOL/L — SIGNIFICANT CHANGE UP (ref -2–2)
BASE EXCESS BLDV CALC-SCNC: -2.3 MMOL/L — LOW (ref -2–2)
BASE EXCESS BLDV CALC-SCNC: 1 MMOL/L — SIGNIFICANT CHANGE UP (ref -2–2)
BASOPHILS # BLD AUTO: 0.1 K/UL — SIGNIFICANT CHANGE UP (ref 0–0.2)
BASOPHILS NFR BLD AUTO: 0.9 % — SIGNIFICANT CHANGE UP (ref 0–2)
BILIRUB SERPL-MCNC: 0.4 MG/DL — SIGNIFICANT CHANGE UP (ref 0.2–1.2)
BILIRUB SERPL-MCNC: 2.9 MG/DL — HIGH (ref 0.2–1.2)
BLOOD GAS VENOUS - CREATININE: SIGNIFICANT CHANGE UP MG/DL (ref 0.5–1.3)
BUN SERPL-MCNC: 22 MG/DL — SIGNIFICANT CHANGE UP (ref 7–23)
BUN SERPL-MCNC: 25 MG/DL — HIGH (ref 7–23)
CA-I SERPL-SCNC: 1 MMOL/L — LOW (ref 1.15–1.33)
CA-I SERPL-SCNC: 1.01 MMOL/L — LOW (ref 1.15–1.33)
CA-I SERPL-SCNC: 1.08 MMOL/L — LOW (ref 1.15–1.33)
CA-I SERPL-SCNC: 1.12 MMOL/L — LOW (ref 1.15–1.33)
CA-I SERPL-SCNC: 1.35 MMOL/L — HIGH (ref 1.15–1.33)
CALCIUM SERPL-MCNC: 9.1 MG/DL — SIGNIFICANT CHANGE UP (ref 8.4–10.5)
CALCIUM SERPL-MCNC: 9.2 MG/DL — SIGNIFICANT CHANGE UP (ref 8.4–10.5)
CHLORIDE BLDV-SCNC: 101 MMOL/L — SIGNIFICANT CHANGE UP (ref 96–108)
CHLORIDE BLDV-SCNC: 102 MMOL/L — SIGNIFICANT CHANGE UP (ref 96–108)
CHLORIDE BLDV-SCNC: 97 MMOL/L — SIGNIFICANT CHANGE UP (ref 96–108)
CHLORIDE BLDV-SCNC: 98 MMOL/L — SIGNIFICANT CHANGE UP (ref 96–108)
CHLORIDE BLDV-SCNC: 98 MMOL/L — SIGNIFICANT CHANGE UP (ref 96–108)
CHLORIDE SERPL-SCNC: 102 MMOL/L — SIGNIFICANT CHANGE UP (ref 96–108)
CHLORIDE SERPL-SCNC: 98 MMOL/L — SIGNIFICANT CHANGE UP (ref 96–108)
CK MB BLD-MCNC: 14.4 % — HIGH (ref 0–3.5)
CK MB CFR SERPL CALC: 222.5 NG/ML — HIGH (ref 0–6.7)
CK SERPL-CCNC: 1548 U/L — HIGH (ref 30–200)
CO2 BLDMV-SCNC: 18 MMOL/L — LOW (ref 21–29)
CO2 BLDMV-SCNC: 22 MMOL/L — SIGNIFICANT CHANGE UP (ref 21–29)
CO2 BLDV-SCNC: 25 MMOL/L — SIGNIFICANT CHANGE UP (ref 22–26)
CO2 BLDV-SCNC: 25 MMOL/L — SIGNIFICANT CHANGE UP (ref 22–26)
CO2 BLDV-SCNC: 27 MMOL/L — HIGH (ref 22–26)
CO2 SERPL-SCNC: 15 MMOL/L — LOW (ref 22–31)
CO2 SERPL-SCNC: 19 MMOL/L — LOW (ref 22–31)
CREAT SERPL-MCNC: 1.05 MG/DL — SIGNIFICANT CHANGE UP (ref 0.5–1.3)
CREAT SERPL-MCNC: 1.18 MG/DL — SIGNIFICANT CHANGE UP (ref 0.5–1.3)
EGFR: 69 ML/MIN/1.73M2 — SIGNIFICANT CHANGE UP
EGFR: 79 ML/MIN/1.73M2 — SIGNIFICANT CHANGE UP
EOSINOPHIL # BLD AUTO: 0.53 K/UL — HIGH (ref 0–0.5)
EOSINOPHIL NFR BLD AUTO: 4.7 % — SIGNIFICANT CHANGE UP (ref 0–6)
FIBRINOGEN PPP-MCNC: 405 MG/DL — SIGNIFICANT CHANGE UP (ref 330–520)
GAS PNL BLDA: SIGNIFICANT CHANGE UP
GAS PNL BLDMV: SIGNIFICANT CHANGE UP
GAS PNL BLDMV: SIGNIFICANT CHANGE UP
GAS PNL BLDV: 128 MMOL/L — LOW (ref 136–145)
GAS PNL BLDV: 129 MMOL/L — LOW (ref 136–145)
GAS PNL BLDV: 130 MMOL/L — LOW (ref 136–145)
GAS PNL BLDV: 131 MMOL/L — LOW (ref 136–145)
GAS PNL BLDV: 135 MMOL/L — LOW (ref 136–145)
GAS PNL BLDV: SIGNIFICANT CHANGE UP
GLUCOSE BLDC GLUCOMTR-MCNC: 113 MG/DL — HIGH (ref 70–99)
GLUCOSE BLDC GLUCOMTR-MCNC: 140 MG/DL — HIGH (ref 70–99)
GLUCOSE BLDC GLUCOMTR-MCNC: 233 MG/DL — HIGH (ref 70–99)
GLUCOSE BLDC GLUCOMTR-MCNC: 234 MG/DL — HIGH (ref 70–99)
GLUCOSE BLDC GLUCOMTR-MCNC: 239 MG/DL — HIGH (ref 70–99)
GLUCOSE BLDV-MCNC: 141 MG/DL — HIGH (ref 70–99)
GLUCOSE BLDV-MCNC: 160 MG/DL — HIGH (ref 70–99)
GLUCOSE BLDV-MCNC: 177 MG/DL — HIGH (ref 70–99)
GLUCOSE BLDV-MCNC: 186 MG/DL — HIGH (ref 70–99)
GLUCOSE BLDV-MCNC: 189 MG/DL — HIGH (ref 70–99)
GLUCOSE SERPL-MCNC: 152 MG/DL — HIGH (ref 70–99)
GLUCOSE SERPL-MCNC: 254 MG/DL — HIGH (ref 70–99)
HCO3 BLDMV-SCNC: 17 MMOL/L — LOW (ref 20–28)
HCO3 BLDMV-SCNC: 21 MMOL/L — SIGNIFICANT CHANGE UP (ref 20–28)
HCO3 BLDV-SCNC: 24 MMOL/L — SIGNIFICANT CHANGE UP (ref 22–29)
HCO3 BLDV-SCNC: 24 MMOL/L — SIGNIFICANT CHANGE UP (ref 22–29)
HCO3 BLDV-SCNC: 26 MMOL/L — SIGNIFICANT CHANGE UP (ref 22–29)
HCT VFR BLD CALC: 34.4 % — LOW (ref 39–50)
HCT VFR BLD CALC: 40.4 % — SIGNIFICANT CHANGE UP (ref 39–50)
HCT VFR BLDA CALC: 29 % — LOW (ref 39–51)
HCT VFR BLDA CALC: 29 % — LOW (ref 39–51)
HCT VFR BLDA CALC: 30 % — LOW (ref 39–51)
HCT VFR BLDA CALC: 30 % — LOW (ref 39–51)
HCT VFR BLDA CALC: 32 % — LOW (ref 39–51)
HEPARINASE TEG R TIME: 6.2 MIN — SIGNIFICANT CHANGE UP (ref 4.3–8.3)
HEPARINASE TEG R TIME: 9.7 MIN (ref 4.3–8.3)
HGB BLD CALC-MCNC: 10 G/DL — LOW (ref 12.6–17.4)
HGB BLD CALC-MCNC: 10.1 G/DL — LOW (ref 12.6–17.4)
HGB BLD CALC-MCNC: 10.6 G/DL — LOW (ref 12.6–17.4)
HGB BLD CALC-MCNC: 9.6 G/DL — LOW (ref 12.6–17.4)
HGB BLD CALC-MCNC: 9.7 G/DL — LOW (ref 12.6–17.4)
HGB BLD-MCNC: 11.5 G/DL — LOW (ref 13–17)
HGB BLD-MCNC: 13.3 G/DL — SIGNIFICANT CHANGE UP (ref 13–17)
HOROWITZ INDEX BLDMV+IHG-RTO: 40 — SIGNIFICANT CHANGE UP
HOROWITZ INDEX BLDMV+IHG-RTO: 70 — SIGNIFICANT CHANGE UP
IMM GRANULOCYTES NFR BLD AUTO: 2.1 % — HIGH (ref 0–1.5)
INR BLD: 1.09 RATIO — SIGNIFICANT CHANGE UP (ref 0.88–1.16)
INR BLD: 1.16 RATIO — SIGNIFICANT CHANGE UP (ref 0.88–1.16)
INR BLD: 1.43 RATIO — HIGH (ref 0.88–1.16)
LACTATE BLDV-MCNC: 1 MMOL/L — SIGNIFICANT CHANGE UP (ref 0.7–2)
LACTATE BLDV-MCNC: 1.5 MMOL/L — SIGNIFICANT CHANGE UP (ref 0.7–2)
LACTATE BLDV-MCNC: 2.3 MMOL/L — HIGH (ref 0.7–2)
LACTATE BLDV-MCNC: 2.7 MMOL/L — HIGH (ref 0.7–2)
LACTATE BLDV-MCNC: 2.9 MMOL/L — HIGH (ref 0.7–2)
LACTATE SERPL-SCNC: 1.3 MMOL/L — SIGNIFICANT CHANGE UP (ref 0.7–2)
LYMPHOCYTES # BLD AUTO: 2.72 K/UL — SIGNIFICANT CHANGE UP (ref 1–3.3)
LYMPHOCYTES # BLD AUTO: 24 % — SIGNIFICANT CHANGE UP (ref 13–44)
MAGNESIUM SERPL-MCNC: 2 MG/DL — SIGNIFICANT CHANGE UP (ref 1.6–2.6)
MAGNESIUM SERPL-MCNC: 2.8 MG/DL — HIGH (ref 1.6–2.6)
MCHC RBC-ENTMCNC: 29.4 PG — SIGNIFICANT CHANGE UP (ref 27–34)
MCHC RBC-ENTMCNC: 30.5 PG — SIGNIFICANT CHANGE UP (ref 27–34)
MCHC RBC-ENTMCNC: 32.9 GM/DL — SIGNIFICANT CHANGE UP (ref 32–36)
MCHC RBC-ENTMCNC: 33.4 GM/DL — SIGNIFICANT CHANGE UP (ref 32–36)
MCV RBC AUTO: 89.2 FL — SIGNIFICANT CHANGE UP (ref 80–100)
MCV RBC AUTO: 91.2 FL — SIGNIFICANT CHANGE UP (ref 80–100)
MONOCYTES # BLD AUTO: 1.05 K/UL — HIGH (ref 0–0.9)
MONOCYTES NFR BLD AUTO: 9.3 % — SIGNIFICANT CHANGE UP (ref 2–14)
MRSA PCR RESULT.: SIGNIFICANT CHANGE UP
NEUTROPHILS # BLD AUTO: 6.67 K/UL — SIGNIFICANT CHANGE UP (ref 1.8–7.4)
NEUTROPHILS NFR BLD AUTO: 59 % — SIGNIFICANT CHANGE UP (ref 43–77)
NRBC # BLD: 0 /100 WBCS — SIGNIFICANT CHANGE UP (ref 0–0)
NRBC # BLD: 0 /100 WBCS — SIGNIFICANT CHANGE UP (ref 0–0)
O2 CT VFR BLD CALC: 48 MMHG — SIGNIFICANT CHANGE UP (ref 30–65)
O2 CT VFR BLD CALC: 62 MMHG — SIGNIFICANT CHANGE UP (ref 30–65)
PCO2 BLDMV: 36 MMHG — SIGNIFICANT CHANGE UP (ref 30–65)
PCO2 BLDMV: 39 MMHG — SIGNIFICANT CHANGE UP (ref 30–65)
PCO2 BLDV: 40 MMHG — LOW (ref 42–55)
PCO2 BLDV: 42 MMHG — SIGNIFICANT CHANGE UP (ref 42–55)
PCO2 BLDV: 45 MMHG — SIGNIFICANT CHANGE UP (ref 42–55)
PCO2 BLDV: 48 MMHG — SIGNIFICANT CHANGE UP (ref 42–55)
PCO2 BLDV: 49 MMHG — SIGNIFICANT CHANGE UP (ref 42–55)
PH BLDMV: 7.24 — LOW (ref 7.32–7.45)
PH BLDMV: 7.37 — SIGNIFICANT CHANGE UP (ref 7.32–7.45)
PH BLDV: 7.33 — SIGNIFICANT CHANGE UP (ref 7.32–7.43)
PH BLDV: 7.33 — SIGNIFICANT CHANGE UP (ref 7.32–7.43)
PH BLDV: 7.34 — SIGNIFICANT CHANGE UP (ref 7.32–7.43)
PH BLDV: 7.39 — SIGNIFICANT CHANGE UP (ref 7.32–7.43)
PH BLDV: 7.4 — SIGNIFICANT CHANGE UP (ref 7.32–7.43)
PHOSPHATE SERPL-MCNC: 3.4 MG/DL — SIGNIFICANT CHANGE UP (ref 2.5–4.5)
PHOSPHATE SERPL-MCNC: 3.9 MG/DL — SIGNIFICANT CHANGE UP (ref 2.5–4.5)
PLATELET # BLD AUTO: 216 K/UL — SIGNIFICANT CHANGE UP (ref 150–400)
PLATELET # BLD AUTO: 422 K/UL — HIGH (ref 150–400)
PO2 BLDV: 58 MMHG — HIGH (ref 25–45)
PO2 BLDV: 67 MMHG — HIGH (ref 25–45)
PO2 BLDV: 72 MMHG — HIGH (ref 25–45)
PO2 BLDV: 78 MMHG — HIGH (ref 25–45)
PO2 BLDV: 86 MMHG — HIGH (ref 25–45)
POTASSIUM BLDV-SCNC: 4 MMOL/L — SIGNIFICANT CHANGE UP (ref 3.5–5.1)
POTASSIUM BLDV-SCNC: 4.4 MMOL/L — SIGNIFICANT CHANGE UP (ref 3.5–5.1)
POTASSIUM BLDV-SCNC: 4.5 MMOL/L — SIGNIFICANT CHANGE UP (ref 3.5–5.1)
POTASSIUM BLDV-SCNC: 5.1 MMOL/L — SIGNIFICANT CHANGE UP (ref 3.5–5.1)
POTASSIUM BLDV-SCNC: 5.3 MMOL/L — HIGH (ref 3.5–5.1)
POTASSIUM SERPL-MCNC: 3.6 MMOL/L — SIGNIFICANT CHANGE UP (ref 3.5–5.3)
POTASSIUM SERPL-MCNC: 4.7 MMOL/L — SIGNIFICANT CHANGE UP (ref 3.5–5.3)
POTASSIUM SERPL-SCNC: 3.6 MMOL/L — SIGNIFICANT CHANGE UP (ref 3.5–5.3)
POTASSIUM SERPL-SCNC: 4.7 MMOL/L — SIGNIFICANT CHANGE UP (ref 3.5–5.3)
PROT SERPL-MCNC: 6.3 G/DL — SIGNIFICANT CHANGE UP (ref 6–8.3)
PROT SERPL-MCNC: 6.9 G/DL — SIGNIFICANT CHANGE UP (ref 6–8.3)
PROTHROM AB SERPL-ACNC: 12.7 SEC — SIGNIFICANT CHANGE UP (ref 10.5–13.4)
PROTHROM AB SERPL-ACNC: 13.4 SEC — SIGNIFICANT CHANGE UP (ref 10.5–13.4)
PROTHROM AB SERPL-ACNC: 16.7 SEC — HIGH (ref 10.5–13.4)
RAPIDTEG MAXIMUM AMPLITUDE: 66.1 MM — SIGNIFICANT CHANGE UP (ref 52–70)
RAPIDTEG MAXIMUM AMPLITUDE: 71.2 MM (ref 52–70)
RBC # BLD: 3.77 M/UL — LOW (ref 4.2–5.8)
RBC # BLD: 4.53 M/UL — SIGNIFICANT CHANGE UP (ref 4.2–5.8)
RBC # FLD: 16.2 % — HIGH (ref 10.3–14.5)
RBC # FLD: 16.3 % — HIGH (ref 10.3–14.5)
S AUREUS DNA NOSE QL NAA+PROBE: SIGNIFICANT CHANGE UP
SAO2 % BLDMV: 77 — SIGNIFICANT CHANGE UP (ref 60–90)
SAO2 % BLDMV: 87.9 — SIGNIFICANT CHANGE UP (ref 60–90)
SAO2 % BLDV: 88.2 % — HIGH (ref 67–88)
SAO2 % BLDV: 94.8 % — HIGH (ref 67–88)
SAO2 % BLDV: 95.1 % — HIGH (ref 67–88)
SAO2 % BLDV: 96.3 % — HIGH (ref 67–88)
SAO2 % BLDV: 97.2 % — HIGH (ref 67–88)
SARS-COV-2 RNA SPEC QL NAA+PROBE: SIGNIFICANT CHANGE UP
SODIUM SERPL-SCNC: 128 MMOL/L — LOW (ref 135–145)
SODIUM SERPL-SCNC: 129 MMOL/L — LOW (ref 135–145)
SODIUM SERPL-SCNC: 142 MMOL/L — SIGNIFICANT CHANGE UP (ref 135–145)
TEG FUNCTIONAL FIBRINOGEN: 23.5 MM — SIGNIFICANT CHANGE UP (ref 15–32)
TEG FUNCTIONAL FIBRINOGEN: 44.3 MM (ref 15–32)
TEG MAXIMUM AMPLITUDE: 66.6 MM — SIGNIFICANT CHANGE UP (ref 52–69)
TEG MAXIMUM AMPLITUDE: 67.8 MM — SIGNIFICANT CHANGE UP (ref 52–69)
TEG REACTION TIME: 7.9 MIN — SIGNIFICANT CHANGE UP (ref 4.6–9.1)
TEG REACTION TIME: 9.7 MIN (ref 4.6–9.1)
TROPONIN T, HIGH SENSITIVITY RESULT: 3761 NG/L — HIGH (ref 0–51)
VANCOMYCIN TROUGH SERPL-MCNC: 14.4 UG/ML — SIGNIFICANT CHANGE UP (ref 10–20)
WBC # BLD: 11.31 K/UL — HIGH (ref 3.8–10.5)
WBC # BLD: 26.57 K/UL — HIGH (ref 3.8–10.5)
WBC # FLD AUTO: 11.31 K/UL — HIGH (ref 3.8–10.5)
WBC # FLD AUTO: 26.57 K/UL — HIGH (ref 3.8–10.5)

## 2022-06-21 PROCEDURE — 33945 TRANSPLANTATION OF HEART: CPT

## 2022-06-21 PROCEDURE — 71045 X-RAY EXAM CHEST 1 VIEW: CPT | Mod: 26,77

## 2022-06-21 PROCEDURE — 88309 TISSUE EXAM BY PATHOLOGIST: CPT | Mod: 26

## 2022-06-21 PROCEDURE — 93010 ELECTROCARDIOGRAM REPORT: CPT | Mod: 77

## 2022-06-21 PROCEDURE — 33945 TRANSPLANTATION OF HEART: CPT | Mod: 80

## 2022-06-21 PROCEDURE — 33272 RMVL OF SUBQ DEFIBRILLATOR: CPT

## 2022-06-21 PROCEDURE — 93287 PERI-PX DEVICE EVAL & PRGR: CPT | Mod: 26

## 2022-06-21 PROCEDURE — 99291 CRITICAL CARE FIRST HOUR: CPT | Mod: GC,25

## 2022-06-21 PROCEDURE — 99292 CRITICAL CARE ADDL 30 MIN: CPT | Mod: GC,25

## 2022-06-21 PROCEDURE — 33241 REMOVE PULSE GENERATOR: CPT

## 2022-06-21 PROCEDURE — 71045 X-RAY EXAM CHEST 1 VIEW: CPT | Mod: 26

## 2022-06-21 PROCEDURE — 99233 SBSQ HOSP IP/OBS HIGH 50: CPT

## 2022-06-21 PROCEDURE — 93010 ELECTROCARDIOGRAM REPORT: CPT | Mod: 76,77

## 2022-06-21 PROCEDURE — 99292 CRITICAL CARE ADDL 30 MIN: CPT

## 2022-06-21 PROCEDURE — 99291 CRITICAL CARE FIRST HOUR: CPT

## 2022-06-21 DEVICE — CANNULA STR SELF INFLATING 3.5MM: Type: IMPLANTABLE DEVICE | Status: FUNCTIONAL

## 2022-06-21 DEVICE — SURGICEL FIBRILLAR 2 X 4": Type: IMPLANTABLE DEVICE | Status: FUNCTIONAL

## 2022-06-21 DEVICE — SHEATH INTRODUCER TERUMO PINNACLE CORONARY 5FR X 10CM X 0.038" MINI WIRE: Type: IMPLANTABLE DEVICE | Status: FUNCTIONAL

## 2022-06-21 DEVICE — KIT A-LINE 1LUM 20G X 12CM SAFE KIT: Type: IMPLANTABLE DEVICE | Status: FUNCTIONAL

## 2022-06-21 DEVICE — CANNULA AORTIC ROOT WITH VENT LINE 12G X 14CM FLANGED: Type: IMPLANTABLE DEVICE | Status: FUNCTIONAL

## 2022-06-21 DEVICE — LIGATING CLIPS WECK HORIZON SMALL-WIDE (RED) 24: Type: IMPLANTABLE DEVICE | Status: FUNCTIONAL

## 2022-06-21 DEVICE — CANNULA VENOUS 1 STAGE RIGHT ANGLE METAL TIP 24FR X 3/8": Type: IMPLANTABLE DEVICE | Status: FUNCTIONAL

## 2022-06-21 DEVICE — SURGIFOAM PAD 8CM X 12.5CM X 10MM (100): Type: IMPLANTABLE DEVICE | Status: FUNCTIONAL

## 2022-06-21 DEVICE — CANNULA CORONARY STR PERF 4MM: Type: IMPLANTABLE DEVICE | Status: FUNCTIONAL

## 2022-06-21 DEVICE — LIGATING CLIPS WECK HORIZON MEDIUM (BLUE) 24: Type: IMPLANTABLE DEVICE | Status: FUNCTIONAL

## 2022-06-21 DEVICE — PACING WIRE ORANGE M-25 WINGED WIRE 37MM X 89MM: Type: IMPLANTABLE DEVICE | Status: FUNCTIONAL

## 2022-06-21 DEVICE — CANNULA VENOUS HLS 25FR X 3/8": Type: IMPLANTABLE DEVICE | Status: FUNCTIONAL

## 2022-06-21 DEVICE — PACING WIRE WHITE M-22 LOOP 89MM: Type: IMPLANTABLE DEVICE | Status: FUNCTIONAL

## 2022-06-21 DEVICE — CANNULA ARTERIAL OPTISITE 20FR X 3/8" VENTED: Type: IMPLANTABLE DEVICE | Status: FUNCTIONAL

## 2022-06-21 DEVICE — INTRODUCER SET MICROPUNCTURE ACCESS 21G X 7CM: Type: IMPLANTABLE DEVICE | Status: FUNCTIONAL

## 2022-06-21 DEVICE — CANNULA CORONARY SILICONE OSTIAL 20FR: Type: IMPLANTABLE DEVICE | Status: FUNCTIONAL

## 2022-06-21 DEVICE — BONE WAX 2.5GM: Type: IMPLANTABLE DEVICE | Status: FUNCTIONAL

## 2022-06-21 DEVICE — CATH VENT LEFT HEART SILICONE 20FR NON-VENTED: Type: IMPLANTABLE DEVICE | Status: FUNCTIONAL

## 2022-06-21 DEVICE — CATH SILICONE THORACIC 28FR STRAIGHT: Type: IMPLANTABLE DEVICE | Status: FUNCTIONAL

## 2022-06-21 RX ORDER — MYCOPHENOLATE MOFETIL 250 MG/1
1000 CAPSULE ORAL
Refills: 0 | Status: DISCONTINUED | OUTPATIENT
Start: 2022-06-22 | End: 2022-06-24

## 2022-06-21 RX ORDER — DOBUTAMINE HCL 250MG/20ML
2.5 VIAL (ML) INTRAVENOUS
Qty: 500 | Refills: 0 | Status: DISCONTINUED | OUTPATIENT
Start: 2022-06-21 | End: 2022-06-23

## 2022-06-21 RX ORDER — PANTOPRAZOLE SODIUM 20 MG/1
40 TABLET, DELAYED RELEASE ORAL DAILY
Refills: 0 | Status: DISCONTINUED | OUTPATIENT
Start: 2022-06-21 | End: 2022-06-26

## 2022-06-21 RX ORDER — CHLORHEXIDINE GLUCONATE 213 G/1000ML
5 SOLUTION TOPICAL
Refills: 0 | Status: DISCONTINUED | OUTPATIENT
Start: 2022-06-21 | End: 2022-06-21

## 2022-06-21 RX ORDER — INSULIN HUMAN 100 [IU]/ML
3 INJECTION, SOLUTION SUBCUTANEOUS
Qty: 100 | Refills: 0 | Status: DISCONTINUED | OUTPATIENT
Start: 2022-06-21 | End: 2022-06-30

## 2022-06-21 RX ORDER — SODIUM BICARBONATE 1 MEQ/ML
50 SYRINGE (ML) INTRAVENOUS ONCE
Refills: 0 | Status: COMPLETED | OUTPATIENT
Start: 2022-06-21 | End: 2022-06-21

## 2022-06-21 RX ORDER — LIDOCAINE HCL 20 MG/ML
1 VIAL (ML) INJECTION ONCE
Refills: 0 | Status: DISCONTINUED | OUTPATIENT
Start: 2022-06-21 | End: 2022-06-21

## 2022-06-21 RX ORDER — CALCIUM GLUCONATE 100 MG/ML
2 VIAL (ML) INTRAVENOUS ONCE
Refills: 0 | Status: COMPLETED | OUTPATIENT
Start: 2022-06-21 | End: 2022-06-21

## 2022-06-21 RX ORDER — EPINEPHRINE 0.3 MG/.3ML
0.04 INJECTION INTRAMUSCULAR; SUBCUTANEOUS
Qty: 4 | Refills: 0 | Status: DISCONTINUED | OUTPATIENT
Start: 2022-06-21 | End: 2022-06-22

## 2022-06-21 RX ORDER — VANCOMYCIN HCL 1 G
1000 VIAL (EA) INTRAVENOUS ONCE
Refills: 0 | Status: DISCONTINUED | OUTPATIENT
Start: 2022-06-21 | End: 2022-06-21

## 2022-06-21 RX ORDER — MUPIROCIN 20 MG/G
1 OINTMENT TOPICAL EVERY 12 HOURS
Refills: 0 | Status: COMPLETED | OUTPATIENT
Start: 2022-06-21 | End: 2022-06-26

## 2022-06-21 RX ORDER — ALBUMIN HUMAN 25 %
250 VIAL (ML) INTRAVENOUS ONCE
Refills: 0 | Status: COMPLETED | OUTPATIENT
Start: 2022-06-21 | End: 2022-06-21

## 2022-06-21 RX ORDER — POLYETHYLENE GLYCOL 3350 17 G/17G
17 POWDER, FOR SOLUTION ORAL DAILY
Refills: 0 | Status: DISCONTINUED | OUTPATIENT
Start: 2022-06-21 | End: 2022-07-08

## 2022-06-21 RX ORDER — CEFEPIME 1 G/1
1000 INJECTION, POWDER, FOR SOLUTION INTRAMUSCULAR; INTRAVENOUS ONCE
Refills: 0 | Status: DISCONTINUED | OUTPATIENT
Start: 2022-06-21 | End: 2022-06-21

## 2022-06-21 RX ORDER — NOREPINEPHRINE BITARTRATE/D5W 8 MG/250ML
0.05 PLASTIC BAG, INJECTION (ML) INTRAVENOUS
Qty: 8 | Refills: 0 | Status: DISCONTINUED | OUTPATIENT
Start: 2022-06-21 | End: 2022-06-23

## 2022-06-21 RX ORDER — POTASSIUM CHLORIDE 20 MEQ
10 PACKET (EA) ORAL
Refills: 0 | Status: COMPLETED | OUTPATIENT
Start: 2022-06-21 | End: 2022-06-21

## 2022-06-21 RX ORDER — CEFEPIME 1 G/1
1000 INJECTION, POWDER, FOR SOLUTION INTRAMUSCULAR; INTRAVENOUS EVERY 8 HOURS
Refills: 0 | Status: DISCONTINUED | OUTPATIENT
Start: 2022-06-21 | End: 2022-06-24

## 2022-06-21 RX ORDER — CHLORHEXIDINE GLUCONATE 213 G/1000ML
1 SOLUTION TOPICAL ONCE
Refills: 0 | Status: DISCONTINUED | OUTPATIENT
Start: 2022-06-21 | End: 2022-06-21

## 2022-06-21 RX ORDER — DEXTROSE 50 % IN WATER 50 %
50 SYRINGE (ML) INTRAVENOUS
Refills: 0 | Status: DISCONTINUED | OUTPATIENT
Start: 2022-06-21 | End: 2022-06-29

## 2022-06-21 RX ORDER — VANCOMYCIN HCL 1 G
125 VIAL (EA) INTRAVENOUS EVERY 12 HOURS
Refills: 0 | Status: DISCONTINUED | OUTPATIENT
Start: 2022-06-22 | End: 2022-07-08

## 2022-06-21 RX ORDER — CHLORHEXIDINE GLUCONATE 213 G/1000ML
15 SOLUTION TOPICAL EVERY 12 HOURS
Refills: 0 | Status: DISCONTINUED | OUTPATIENT
Start: 2022-06-21 | End: 2022-06-22

## 2022-06-21 RX ORDER — MEPERIDINE HYDROCHLORIDE 50 MG/ML
25 INJECTION INTRAMUSCULAR; INTRAVENOUS; SUBCUTANEOUS ONCE
Refills: 0 | Status: DISCONTINUED | OUTPATIENT
Start: 2022-06-21 | End: 2022-06-22

## 2022-06-21 RX ORDER — SODIUM CHLORIDE 9 MG/ML
1000 INJECTION INTRAMUSCULAR; INTRAVENOUS; SUBCUTANEOUS
Refills: 0 | Status: DISCONTINUED | OUTPATIENT
Start: 2022-06-21 | End: 2022-06-29

## 2022-06-21 RX ORDER — MYCOPHENOLATE MOFETIL 250 MG/1
1000 CAPSULE ORAL ONCE
Refills: 0 | Status: DISCONTINUED | OUTPATIENT
Start: 2022-06-21 | End: 2022-06-21

## 2022-06-21 RX ORDER — DEXMEDETOMIDINE HYDROCHLORIDE IN 0.9% SODIUM CHLORIDE 4 UG/ML
0.3 INJECTION INTRAVENOUS
Qty: 200 | Refills: 0 | Status: DISCONTINUED | OUTPATIENT
Start: 2022-06-21 | End: 2022-06-23

## 2022-06-21 RX ORDER — SODIUM BICARBONATE 1 MEQ/ML
50 SYRINGE (ML) INTRAVENOUS
Refills: 0 | Status: COMPLETED | OUTPATIENT
Start: 2022-06-21 | End: 2022-06-21

## 2022-06-21 RX ORDER — VANCOMYCIN HCL 1 G
750 VIAL (EA) INTRAVENOUS EVERY 12 HOURS
Refills: 0 | Status: DISCONTINUED | OUTPATIENT
Start: 2022-06-21 | End: 2022-06-24

## 2022-06-21 RX ORDER — DEXTROSE 50 % IN WATER 50 %
25 SYRINGE (ML) INTRAVENOUS
Refills: 0 | Status: DISCONTINUED | OUTPATIENT
Start: 2022-06-21 | End: 2022-06-24

## 2022-06-21 RX ORDER — MUPIROCIN 20 MG/G
1 OINTMENT TOPICAL
Refills: 0 | Status: DISCONTINUED | OUTPATIENT
Start: 2022-06-21 | End: 2022-06-21

## 2022-06-21 RX ADMIN — Medication 81 MILLIGRAM(S): at 11:12

## 2022-06-21 RX ADMIN — DEXMEDETOMIDINE HYDROCHLORIDE IN 0.9% SODIUM CHLORIDE 5.47 MICROGRAM(S)/KG/HR: 4 INJECTION INTRAVENOUS at 21:16

## 2022-06-21 RX ADMIN — SODIUM NITROPRUSSIDE 2.73 MICROGRAM(S)/KG/MIN: 50 INJECTION INTRAVENOUS at 05:15

## 2022-06-21 RX ADMIN — Medication 50 MILLIEQUIVALENT(S): at 21:15

## 2022-06-21 RX ADMIN — Medication 166.67 MILLILITER(S): at 08:18

## 2022-06-21 RX ADMIN — Medication 25 MILLIGRAM(S): at 05:17

## 2022-06-21 RX ADMIN — MILRINONE LACTATE 11 MICROGRAM(S)/KG/MIN: 1 INJECTION, SOLUTION INTRAVENOUS at 05:16

## 2022-06-21 RX ADMIN — Medication 100 MILLIGRAM(S): at 11:12

## 2022-06-21 RX ADMIN — Medication 100 MILLIGRAM(S): at 05:16

## 2022-06-21 RX ADMIN — Medication 200 GRAM(S): at 21:15

## 2022-06-21 RX ADMIN — Medication 50 MILLIEQUIVALENT(S): at 21:29

## 2022-06-21 RX ADMIN — Medication 50 MILLIEQUIVALENT(S): at 23:04

## 2022-06-21 RX ADMIN — Medication 125 MILLIGRAM(S): at 22:02

## 2022-06-21 RX ADMIN — Medication 50 MILLIEQUIVALENT(S): at 21:17

## 2022-06-21 RX ADMIN — INSULIN HUMAN 3 UNIT(S)/HR: 100 INJECTION, SOLUTION SUBCUTANEOUS at 21:15

## 2022-06-21 RX ADMIN — Medication 5.47 MICROGRAM(S)/KG/MIN: at 22:02

## 2022-06-21 RX ADMIN — ISOSORBIDE DINITRATE 40 MILLIGRAM(S): 5 TABLET ORAL at 06:06

## 2022-06-21 RX ADMIN — CHLORHEXIDINE GLUCONATE 1 APPLICATION(S): 213 SOLUTION TOPICAL at 05:07

## 2022-06-21 RX ADMIN — Medication 50 MILLIEQUIVALENT(S): at 22:17

## 2022-06-21 RX ADMIN — EPINEPHRINE 10.9 MICROGRAM(S)/KG/MIN: 0.3 INJECTION INTRAMUSCULAR; SUBCUTANEOUS at 21:16

## 2022-06-21 RX ADMIN — AMIODARONE HYDROCHLORIDE 200 MILLIGRAM(S): 400 TABLET ORAL at 05:17

## 2022-06-21 RX ADMIN — PANTOPRAZOLE SODIUM 40 MILLIGRAM(S): 20 TABLET, DELAYED RELEASE ORAL at 05:16

## 2022-06-21 RX ADMIN — Medication 125 MILLILITER(S): at 21:17

## 2022-06-21 RX ADMIN — Medication 6.83 MICROGRAM(S)/KG/MIN: at 21:15

## 2022-06-21 RX ADMIN — CEFEPIME 100 MILLIGRAM(S): 1 INJECTION, POWDER, FOR SOLUTION INTRAMUSCULAR; INTRAVENOUS at 21:25

## 2022-06-21 RX ADMIN — CHLORHEXIDINE GLUCONATE 5 MILLILITER(S): 213 SOLUTION TOPICAL at 12:02

## 2022-06-21 RX ADMIN — Medication 50 MILLIEQUIVALENT(S): at 21:16

## 2022-06-21 NOTE — PRE-ANESTHESIA EVALUATION ADULT - NSANTHPMHFT_GEN_ALL_CORE
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 LHC did not reveal new obstructive CAD and he underwent EPS which did not reveal endocardial substrate amenable for ablation. RHC revealed severely depressed cardiac output and he was transferred to [facility] 5/26 for advanced therapies evaluation; now for heart transplant.

## 2022-06-21 NOTE — PROGRESS NOTE ADULT - NS ATTEND AMEND GEN_ALL_CORE FT
No acute events overnight.  A suitable donor became available and patient is scheduled to undergo OHT today.  Labs with hyponatremia, will repeat this am preop.   Will hold vasodilators and keep NPO.   Plan d/w patient and CTS team.

## 2022-06-21 NOTE — PRE-ANESTHESIA EVALUATION ADULT - NSANTHADDINFOFT_GEN_ALL_CORE
Anesthetic plan, including risks and benefits, discussed extensively with patient.  Risks included but not limited to: dental damage, throat pain, line site bleeding/infection/clot, heart attac or stroke.  The patient conveyed understanding.

## 2022-06-21 NOTE — PROGRESS NOTE ADULT - PROBLEM SELECTOR PLAN 2
- continue Toprol XL 25 mg PO QD   - Re-listed UNOS status 2e for OHT, suitable donor identified plan for transplant this afternoon (ABO A, PRA 0%)

## 2022-06-21 NOTE — PROGRESS NOTE ADULT - ASSESSMENT
====================ASSESSMENT ==============  66 year old man w/ PMHx HTN, HLD, type 2 DM, chronic HFrEF (EF 25-30% by Echo), complete heart block s/p PPM (in 2006, upgraded to BiV-ICD in 09/2016), CAD (s/p recent BERNABE mid LAD at [facility] on 04/18/2022), and stage II CKD (baseline Cr ~1.3) presented with near syncope to OSH found to have 41 episodes of VT on device, s/p unsuccessful VT ablation and transferred to [facility] for management of cardiogenic shock and advanced therapy eval.       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  - bedside nipride study done 5/29 with reduction in PVR to <2    ====================CARDIOVASCULAR==================  Cardiogenic shock  - Maintain IABP 1:1,monitor PTT on heparin gtt  - Maintain Milrinone 0.5mcg/kg/min  - Cont hydral 100 TID and ISDN 40TID and Nipride gtt (down titrated to 0.5 from 1) for AL Reduction, with goal Assisted mean goal 70-80   - Follow up pending Cyanide level to rule out toxicity from Nitroprusside   - HF following, listed for OHT Status 2E      VTach   - s/p EPS on 5/24, unable to perform ablation as no substrate found and did not induce VT because of severely low EF   - Interrogation of ICD @Dr Wilson's office 5/20: back-to-back episodes of VT @ 200+ bpm all terminating with ATP. Since last cath pt has had 41 VT episodes (all falling into VF zone)  - Normal device function. BIV pacing 97%. No programming changes made  - Cont. Amiodarone 200 QD and Toprol 25 daily  - EP consult 6/15 for atrial tachycardia, prior VT as assessment of amiodarone; device interrogated, no recorded AT/AF or VT/VF. Per EP, no new interventions for slow NSVT.   - c/w amiodarone and toprol.    CAD   - Chest pain free and compliant with DAPT since last PCI 4/18/22 per discussion with interventional at OSH, ok to DC plavix pending cardiac surgery, last dose 5/28  - Cardiac cath @[facility] 4/18/22: mLAD 90% s/p BERNABE, LCx mild disease, RCA mild disease s/p diagnostic cardiac cath w/ Dr Wagner on 05/23/2022   - C/w ASA, d/c'ed lipitor for elevated LFTs, pending open heart transplant    ===================HEMATOLOGIC/ONC ===================  ?Secondary Polycythemia Vera   - elevated EPO, hgb in normal range  - as per heme low suspicion for PV  - MELINA-2 wnl    AC therapy   - Heparin gtt for IABP   - Supra therapeutic PTT ~121 and decreased hep gtt to 10  - Will follow up next PTT    ===================== RENAL =========================  JAGRUTI on CKD II  Admitted w/ Cr 2.01 (baseline Cr ~1.3), initial JAGRUTI 2/2 cardiogenic shock given RHC findings of CI 1.88. Improving  - Avoid nephrotoxic agents, NSAIDs. Renally dose meds.  - monitor strict I/Os and continue current cardiac support  - Continue monitoring urine output    Hyponatremia Na stable 128-130  - Urine studies so far c/w possible SIADH vs poor Na intake. Cardiorenal c/s 6/17  - euvolemic/hypovolemic, hypotonic     ==================== GASTROINTESTINAL===================  Elevated Liver Enzymes:  - elevated AST/ALT, normal bilirubin, alk phos. statin d/c 6/13. Downtrending  - Abd US on 6/14: Liver wnl, contracted gallbladder in the setting of recent meal. No acute cholecystitis.    Diet:   - Tolerating PO diet, Constant carb    Constipation:  - Cont. bowel regimen Miralax, Senna, PRN simethicone and bisacodyl   - s/p colonoscopy 6/3 with 5 polyps removed and biopsied, benign findings- GI recs for 3 yr follow up colo  - Protonix for GI prophylaxis    =======================    ENDOCRINE  =====================  T2DM (A1C 6.2 on admission)  - Cont. ISS, glucose controlled, monitor FS closely     Gout flare, Left knee (previously R)  - continue allopurinol, s/p prednisone  - 6/15: with right wrist pain, left knee and left ankle pain possible 2/2 gout flare, xray wrist with findings c/w OA    Bilateral knee pain in setting of osteoarthritis   - Tylenol PRN     ========================INFECTIOUS DISEASE================  UE Thrombophlebitis  - RUE US Duplex showing superficial thrombus  - s/p course of Ancef     No active issues  - afebrile, mild leukocytosis  - UA neg, BCx on 6/7 NGTD x2, RVP neg   - follow fever curve, WBC   - CXR 6/17 with b/l patchy opacities c/w atelectasis, encourage incentive spirometer

## 2022-06-21 NOTE — PROGRESS NOTE ADULT - NS MD NEURO CONDITIONS_SHOCK
Cardiogenic Shock

## 2022-06-21 NOTE — PROGRESS NOTE ADULT - SUBJECTIVE AND OBJECTIVE BOX
HPI:  66M Mixed Ischemic/NICM Cardiomyopathy (EF 25-30% at baseline, s/p BIV-ICD), CAD (medically managed MIs in 2008,2011, Most recent stent in April 2022 to mLAD) presented with multiple near syncope, found to have 41 episodes of VT and admitted initially to cardiac telemetry for further evaluation/management. Ischemic evaluation without new disease and VT ablation without substrate to complete ablation.   Transferred from [facility] for further management and evaluation for LVAD vs OHT.    (26 May 2022 20:31)  HPI:  66M Mixed Ischemic/NICM Cardiomyopathy (EF 25-30% at baseline, s/p BIV-ICD), CAD (medically managed MIs in 2008,2011, Most recent stent in April 2022 to mLAD) presented with multiple near syncope, found to have 41 episodes of VT and admitted initially to cardiac telemetry for further evaluation/management. Ischemic evaluation without new disease and VT ablation without substrate to complete ablation.   Transferred from [facility] for further management and evaluation for LVAD vs OHT.    (26 May 2022 20:31)      Patient seen and examined at the bedside.    Remained critically ill on continuous ICU monitoring.    OBJECTIVE:  Vital Signs Last 24 Hrs  T(C): 35.8 (21 Jun 2022 21:00), Max: 37.3 (21 Jun 2022 03:00)  T(F): 96.4 (21 Jun 2022 21:00), Max: 99.1 (21 Jun 2022 03:00)  HR: 118 (21 Jun 2022 22:45) (80 - 118)  BP: 118/80 (21 Jun 2022 20:45) (118/80 - 118/80)  BP(mean): 94 (21 Jun 2022 20:45) (94 - 94)  RR: 17 (21 Jun 2022 22:45) (15 - 45)  SpO2: 99% (21 Jun 2022 22:45) (92% - 100%)      Physical Exam:   General: intubated multiple lines gtt & tubes   Neurology: sedated   Eyes: bilateral pupils equal and reactive   ENT/Neck: +ETT midline, Neck supple, trachea midline, No JVD   Respiratory: Clear bilaterally   CV: S1S2, no murmurs        [x] Sternal dressing, [x] Mediastinal CT, [x] Pleural CT, [x] MARIAA drain        [x] Sinus rhythm, [x] Afib, [x] Temporary pacing, [x] PPM  Abdominal: Soft, NT, ND +BS,   Extremities: 1-2+ pedal edema noted, + peripheral pulses   Skin: No Rashes, Hematoma, Ecchymosis                           Assessment:    Cardiogenic Shock  Hypovolemic Shock   Post op respiratory insufficiency     NEURO: Without focal deficit, Intermittent IV Dilaudid ordered for pain  RESP: Patient initially required full ventilator support with subsequent weaning to pressure support and extubation, continue close monitoring of respiratory rate, pulse oximetry, breathing pattern and intermittent blood gas analysis with adequate pain control to prevent atelectasis.  Mode: AC/ CMV (Assist Control/ Continuous Mandatory Ventilation)  RR (machine): 14  TV (machine): 550  FiO2: 70  PEEP: 5  ITime: 1  MAP: 11  PIP: 21    CVS: Patient has required volume resuscitation, and IV Norepinephrine and IV Vasopressin infusions for hypovolemic shock with associated lactic acidosis. Ongoing resuscitative efforts and follow up of lactate.  GI: Stress ulcer prophylaxis with protonix.  RENAL: Optimize renal perfusion with adequate volume resuscitation and maintenance of blood pressure with close monitoring of fluid balance, urine output and electrolytes.  ENDOCRINE: Metabolic stability and prevention of infection and stress hyperglycemia/type 2 diabetes with hyperglycemia required an insulin infusion and hourly glucose checks  HEME: Anemia due to acute blood loss, no current plant for transfusion.    Patient requires continuous monitoring with EKG, arterial line, pulmonary artery catheter, pulse oximetry, inspiratory and expiratory tidal volumes, peak airway pressures, breathing pattern and intermittent blood gas analysis and cardiac output measurement.    Care plan discussed with ICU care team. I have spent 35 minutes providing non routine post op care for this critically ill patient. HPI:  66M Mixed Ischemic/NICM Cardiomyopathy (EF 25-30% at baseline, s/p BIV-ICD), CAD (medically managed MIs in 2008,2011, Most recent stent in April 2022 to mLAD) presented with multiple near syncope, found to have 41 episodes of VT and admitted initially to cardiac telemetry for further evaluation/management. Ischemic evaluation without new disease and VT ablation without substrate to complete ablation.   Transferred from [facility] for further management and evaluation for LVAD vs OHT.    (26 May 2022 20:31)    Patient underwent OHT today without complication.    Patient seen and examined at the bedside.    Remained critically ill on continuous ICU monitoring.    OBJECTIVE:  Vital Signs Last 24 Hrs  T(C): 35.8 (21 Jun 2022 21:00), Max: 37.3 (21 Jun 2022 03:00)  T(F): 96.4 (21 Jun 2022 21:00), Max: 99.1 (21 Jun 2022 03:00)  HR: 118 (21 Jun 2022 22:45) (80 - 118)  BP: 118/80 (21 Jun 2022 20:45) (118/80 - 118/80)  BP(mean): 94 (21 Jun 2022 20:45) (94 - 94)  RR: 17 (21 Jun 2022 22:45) (15 - 45)  SpO2: 99% (21 Jun 2022 22:45) (92% - 100%)      Physical Exam:   General: intubated multiple lines gtt & tubes   Neurology: not yet awake after anesthesia  Eyes: bilateral pupils equal and reactive   ENT/Neck: +ETT midline, Neck supple, trachea midline, No JVD   Respiratory: Clear bilaterally   CV: S1S2, no murmurs        [x] Sternal dressing, [x] Mediastinal CT        [x] Sinus tachycardia [x] Temporary pacing, [x] PPM  Abdominal: Soft, NT, ND +BS,   Extremities: No pedal edema noted, + peripheral pulses   Skin: No Rashes, Hematoma, Ecchymosis                           Assessment:    NEURO: Patient has not yet awoken after anesthesia    RESP: Patient requires full ventilator support, continue close monitoring of respiratory rate, pulse oximetry, breathing pattern and intermittent blood gas analysis with adequate pain control to prevent atelectasis.    Mode: AC/ CMV (Assist Control/ Continuous Mandatory Ventilation)  RR (machine): 14  TV (machine): 550  FiO2: 70  PEEP: 5  ITime: 1  MAP: 11  PIP: 21    CVS: Patient recovering s/p OHT, has required cautious volume resuscitation, and returned to CTICU on inotropic support with an IV Epinephrine infusion and an IV Norepinephrine infusion for acute right heart systolic failure with associated lactic acidosis. Ongoing resuscitative efforts and follow up of lactate.    GI: Stress ulcer prophylaxis with protonix.    RENAL: Optimize renal perfusion with adequate volume resuscitation and maintenance of blood pressure with close monitoring of fluid balance, urine output and electrolytes.    ENDOCRINE: Metabolic stability and prevention of infection and stress hyperglycemia with hyperglycemia required an insulin infusion and hourly glucose checks    HEME: Anemia due to acute blood loss, no current plant for transfusion.    Patient requires continuous monitoring with EKG, arterial line, pulmonary artery catheter, pulse oximetry, inspiratory and expiratory tidal volumes, peak airway pressures, breathing pattern and intermittent blood gas analysis and cardiac output measurement.    Care plan discussed with ICU care team. I have spent 30 minutes providing non routine post op care for this critically ill patient. HPI:  66M Mixed Ischemic/NICM Cardiomyopathy (EF 25-30% at baseline, s/p BIV-ICD), CAD (medically managed MIs in 2008,2011, Most recent stent in April 2022 to mLAD) presented with multiple near syncope, found to have 41 episodes of VT and admitted initially to cardiac telemetry for further evaluation/management. Ischemic evaluation without new disease and VT ablation without substrate to complete ablation.   Transferred from [facility] for further management and evaluation for LVAD vs OHT.    (26 May 2022 20:31)    Patient underwent OHT today without complication.    Patient seen and examined at the bedside.    Remained critically ill on continuous ICU monitoring.    OBJECTIVE:  Vital Signs Last 24 Hrs  T(C): 35.8 (21 Jun 2022 21:00), Max: 37.3 (21 Jun 2022 03:00)  T(F): 96.4 (21 Jun 2022 21:00), Max: 99.1 (21 Jun 2022 03:00)  HR: 118 (21 Jun 2022 22:45) (80 - 118)  BP: 118/80 (21 Jun 2022 20:45) (118/80 - 118/80)  BP(mean): 94 (21 Jun 2022 20:45) (94 - 94)  RR: 17 (21 Jun 2022 22:45) (15 - 45)  SpO2: 99% (21 Jun 2022 22:45) (92% - 100%)      Physical Exam:   General: intubated multiple lines gtt & tubes   Neurology: not yet awake after anesthesia  Eyes: bilateral pupils equal and reactive   ENT/Neck: +ETT midline, Neck supple, trachea midline, No JVD   Respiratory: Clear bilaterally   CV: S1S2, no murmurs        [x] Sternal dressing, [x] Mediastinal CT        [x] Sinus tachycardia [x] Temporary pacing, [x] PPM  Abdominal: Soft, NT, ND +BS,   Extremities: No pedal edema noted, + peripheral pulses   Skin: No Rashes, Hematoma, Ecchymosis                           Assessment:    NEURO: Patient has not yet awoken after anesthesia    RESP: Patient requires full ventilator support, continue close monitoring of respiratory rate, pulse oximetry, breathing pattern and intermittent blood gas analysis. Inhaled nitric oxide at 30 ppm for pulmonary hypertension.    Mode: AC/ CMV (Assist Control/ Continuous Mandatory Ventilation)  RR (machine): 14  TV (machine): 550  FiO2: 70  PEEP: 5  ITime: 1  MAP: 11  PIP: 21    CVS: Patient recovering s/p OHT, has required cautious volume resuscitation, and returned to CTICU on inotropic support with an IV Epinephrine infusion and an IV Norepinephrine infusion for acute right heart systolic failure with associated lactic acidosis. Ongoing resuscitative efforts and follow up of lactate.    GI: Stress ulcer prophylaxis with protonix.    RENAL: Optimize renal perfusion with adequate volume resuscitation and maintenance of blood pressure with close monitoring of fluid balance, urine output and electrolytes.    ENDOCRINE: Metabolic stability and prevention of infection and stress hyperglycemia with hyperglycemia required an insulin infusion and hourly glucose checks    HEME: Anemia due to acute blood loss, no current plant for transfusion.    Patient requires continuous monitoring with EKG, arterial line, pulmonary artery catheter, pulse oximetry, inspiratory and expiratory tidal volumes, peak airway pressures, breathing pattern and intermittent blood gas analysis and cardiac output measurement.    Care plan discussed with ICU care team. I have spent 30 minutes providing non routine post op care for this critically ill patient.

## 2022-06-21 NOTE — PROGRESS NOTE ADULT - SUBJECTIVE AND OBJECTIVE BOX
Patient is a 66y old  Male who presents with a chief complaint of LVAD/OHT eval (2022 22:11)      INTERVAL HISTORYOVERNIGHT EVENTS:  -No acute events    SUBJECTIVE  - Patient seen and evaluated at bedside  - Patient reports feeling stomach ache and thinks it is because he has been NPO past midnight  - Patient reports absence of fevers, headache, visual changes, chest pain, dyspnea, palpitations, abd pain, urinary symptoms, or LE edema     MEDICATIONS:  allopurinol 100 milliGRAM(s) Oral daily  aMIOdarone    Tablet 200 milliGRAM(s) Oral daily  aspirin enteric coated 81 milliGRAM(s) Oral daily  chlorhexidine 2% Cloths 1 Application(s) Topical daily  chlorhexidine 4% Liquid 1 Application(s) Topical <User Schedule>  heparin  Infusion 1200 Unit(s)/Hr (10 mL/Hr) IV Continuous <Continuous>  hydrALAZINE 100 milliGRAM(s) Oral every 8 hours  insulin lispro (ADMELOG) corrective regimen sliding scale   SubCutaneous at bedtime  insulin lispro (ADMELOG) corrective regimen sliding scale   SubCutaneous three times a day before meals  isosorbide   dinitrate Tablet (ISORDIL) 40 milliGRAM(s) Oral three times a day  metoprolol succinate ER 25 milliGRAM(s) Oral daily  milrinone Infusion 0.5 MICROgram(s)/kG/Min (11 mL/Hr) IV Continuous <Continuous>  nitroprusside Infusion 0.25 MICROgram(s)/kG/Min (2.73 mL/Hr) IV Continuous <Continuous>  pantoprazole    Tablet 40 milliGRAM(s) Oral before breakfast  polyethylene glycol 3350 17 Gram(s) Oral daily  senna 1 Tablet(s) Oral at bedtime    MEDICATIONS  (PRN):  bisacodyl Suppository 10 milliGRAM(s) Rectal daily PRN Constipation  simethicone 80 milliGRAM(s) Chew every 6 hours PRN Gas  sodium chloride 0.65% Nasal 1 Spray(s) Both Nostrils every 6 hours PRN Nasal Congestion    ALLERGIES:  Allergies    No Known Allergies  Intolerances    OBJECTIVE:  ICU Vital Signs Last 24 Hrs  T(C): 36.6 (2022 07:30), Max: 37.3 (2022 03:00)  T(F): 97.8 (2022 07:30), Max: 99.1 (2022 03:00)  HR: 80 (2022 07:30) (79 - 108)  RR: 23 (2022 07:30) (18 - 32)  SpO2: 94% (2022 07:30) (82% - 95%)    CAPILLARY BLOOD GLUCOSE  POCT Blood Glucose.: 140 mg/dL (2022 06:03)  POCT Blood Glucose.: 119 mg/dL (2022 22:07)  POCT Blood Glucose.: 115 mg/dL (2022 17:05)  POCT Blood Glucose.: 115 mg/dL (2022 12:36)    CAPILLARY BLOOD GLUCOSE  POCT Blood Glucose.: 140 mg/dL (2022 06:03)    I&O's Summary  2022 07:01  -  2022 07:00  --------------------------------------------------------  IN: 1188.4 mL / OUT: 2600 mL / NET: -1411.6 mL    Daily     Daily Weight in k.6 (2022 00:00)    PHYSICAL EXAM:  General: NAD  Eyes: Sclera clear  Chest: Clear to auscultation bilaterally; no rales or wheezing  Heart: IABP in place; intact S1 and S2; no murmurs, rubs, or gallops appreciated  Abd: Soft, nontender, nondistended  Nervous System: Alert and oriented to situation  Psych: Appropriate affect   Ext: no peripheral LE edema bilaterally; peripheral radial and pedal pulses intact    LABS:               13.3   11.31 )-----------( 422      ( 2022 01:15 )             40.4     06-21    128<L>  |  98  |  25<H>  ----------------------------<  152<H>  4.7   |  19<L>  |  1.18    Ca    9.1      2022 01:15  Phos  3.9     06-21  Mg     2.0     06-21    TPro  6.9  /  Alb  3.1<L>  /  TBili  0.4  /  DBili  x   /  AST  31  /  ALT  58<H>  /  AlkPhos  90  06-21    LIVER FUNCTIONS - ( 2022 01:15 )  Alb: 3.1 g/dL / Pro: 6.9 g/dL / ALK PHOS: 90 U/L / ALT: 58 U/L / AST: 31 U/L / GGT: x           PT/INR - ( 2022 01:15 )   PT: 13.4 sec;   INR: 1.16 ratio         PTT - ( 2022 01:15 )  PTT:121.1 sec      RADIOLOGY & ADDITIONAL TESTS:    Xray Chest 1 View- PORTABLE-Routine (Xray Chest 1 View- PORTABLE-Routine in AM.) (22 @ 05:26) >  Impression:  No acute cardiopulmonary process.      Care Discussed with Consultants/Other Providers [ x] YES  [ ] NO

## 2022-06-21 NOTE — PROGRESS NOTE ADULT - TIME BILLING
- Generation of cardiovascular treatment plan.  - Coordination of care with primary team.
coordination of care, patient education, chart review
- Review of notes/records, telemetry, vital signs and daily labs.   - General and cardiovascular physical examination.  - Generation of cardiovascular treatment plan.  - Coordination of care with primary team.

## 2022-06-21 NOTE — PROGRESS NOTE ADULT - PROBLEM SELECTOR PLAN 11
- slightly improved today  - note serum/urine osmolarity- would ask cardiorenal to comment on cause of hyponatremia.   in the meanwhile would encourage oral intake as fluid resuscitation has improved renal function
- recently stable 130-132  - note serum/urine osmolarity- appreciate cardiorenal input, thought to he hypoosmolar hyponatremia in setting of HF
- recently relatively stable, Na improved from 128 to 130 following albumin  - note serum/urine osmolarity- appreciate cardiorenal input, thought to he hypoosmolar hyponatremia in setting of HF
- slightly improved today  - note serum/urine osmolarity- would ask cardiorenal to comment on cause of hyponatremia.   in the meanwhile would encourage oral intake as fluid resuscitation has improved renal function
- will check CVP to assess volume status  - send serum and urine lytes, osmo

## 2022-06-21 NOTE — PROGRESS NOTE ADULT - SUBJECTIVE AND OBJECTIVE BOX
Subjective:  - suitable donor heart accepted with plan for transplant this afternoon  - denies complaints    Medications:  allopurinol 100 milliGRAM(s) Oral daily  aMIOdarone    Tablet 200 milliGRAM(s) Oral daily  aspirin enteric coated 81 milliGRAM(s) Oral daily  bisacodyl Suppository 10 milliGRAM(s) Rectal daily PRN  cefepime   IVPB 1000 milliGRAM(s) IV Intermittent once  chlorhexidine 0.12% Liquid 5 milliLiter(s) Swish and Spit two times a day  chlorhexidine 2% Cloths 1 Application(s) Topical daily  chlorhexidine 4% Liquid 1 Application(s) Topical once  chlorhexidine 4% Liquid 1 Application(s) Topical <User Schedule>  heparin  Infusion 1200 Unit(s)/Hr IV Continuous <Continuous>  hydrALAZINE 100 milliGRAM(s) Oral every 8 hours  insulin lispro (ADMELOG) corrective regimen sliding scale   SubCutaneous at bedtime  insulin lispro (ADMELOG) corrective regimen sliding scale   SubCutaneous three times a day before meals  isosorbide   dinitrate Tablet (ISORDIL) 40 milliGRAM(s) Oral three times a day  lidocaine 1% Injectable 1 milliLiter(s) Local Injection once  methylPREDNISolone sodium succinate IVPB 500 milliGRAM(s) IV Intermittent once  methylPREDNISolone sodium succinate IVPB 500 milliGRAM(s) IV Intermittent once  metoprolol succinate ER 25 milliGRAM(s) Oral daily  milrinone Infusion 0.5 MICROgram(s)/kG/Min IV Continuous <Continuous>  mupirocin 2% Ointment 1 Application(s) Topical two times a day  mycophenolate mofetil IVPB 1000 milliGRAM(s) IV Intermittent once  nitroprusside Infusion 0.25 MICROgram(s)/kG/Min IV Continuous <Continuous>  pantoprazole    Tablet 40 milliGRAM(s) Oral before breakfast  polyethylene glycol 3350 17 Gram(s) Oral daily  senna 1 Tablet(s) Oral at bedtime  simethicone 80 milliGRAM(s) Chew every 6 hours PRN  sodium chloride 0.65% Nasal 1 Spray(s) Both Nostrils every 6 hours PRN  vancomycin  IVPB 1000 milliGRAM(s) IV Intermittent once      Physical Exam:    Vitals:  Vital Signs Last 24 Hours  T(C): 36.6 (22 @ 11:22), Max: 37.3 (22 @ 03:00)  HR: 80 (22 @ 11:22) (80 - 112)  BP: IABP 1:1 assisted means 79-90, aug diastolic 102-111  RR: 21 (22 @ 11:22) (18 - 32)  SpO2: 93% (22 @ 11:22) (87% - 95%)    Weight in k.6 ( @ 00:00)    I&O's Summary    2022 07:01  -  2022 07:00  --------------------------------------------------------  IN: 1188.4 mL / OUT: 2600 mL / NET: -1411.6 mL    2022 07:01  -  2022 12:08  --------------------------------------------------------  IN: 127.2 mL / OUT: 500 mL / NET: -372.8 mL    Tele: AV paced 80s    General: No distress. Comfortable.  HEENT: EOM intact.  Neck: Neck supple. JVP not elevated. No masses  Chest: Clear to auscultation bilaterally  CV: Normal S1 and S2. No murmurs, rub, or gallops. Radial pulses normal. palpable DP pulses. No LE edema  Abdomen: Soft, non-distended, non-tender. R fem IABP insertion site soft without hematoma or drainage. Dressing C/D/I  Skin: No rashes or skin breakdown  Neurology: Alert and oriented times three. Sensation intact  Psych: Affect normal    Labs:                        13.3   11.31 )-----------( 422      ( 2022 01:15 )             40.4     06-    129<L>  |  x   |  x   ----------------------------<  x   x    |  x   |  x     Ca    9.1      2022 01:15  Phos  3.9       Mg     2.0         TPro  6.9  /  Alb  3.1<L>  /  TBili  0.4  /  DBili  x   /  AST  31  /  ALT  58<H>  /  AlkPhos  90      PT/INR - ( 2022 08:55 )   PT: 12.7 sec;   INR: 1.09 ratio      PTT - ( 2022 08:55 )  PTT:43.3 sec    Lactate, Blood: 1.3 mmol/L ( @ 01:15)  Lactate, Blood: 1.3 mmol/L ( @ 03:51)  Lactate, Blood: 1.2 mmol/L ( @ 04:00)

## 2022-06-21 NOTE — PROGRESS NOTE ADULT - ASSESSMENT
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 LHC did not reveal new obstructive CAD and he underwent EPS which did not reveal endocardial substrate amenable for ablation. RHC revealed severely depressed cardiac output and he was transferred to [facility] 5/26 for advanced therapies evaluation.    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 He improved significantly with sequential uptitration of inotropes and increased pacing rate, but on 6/6 he had worsening JAGRUTI. He is s/p RHC which revealed severely elevated filling pressures, severe cpcPH, and CI of 1.9 on milrinone 0.5. IABP was placed and his renal function/PAPs have improved. He is MCS dependent and was inadequately supported with high dose inotropes, so was listed UNOS status 2e for OHT, awaiting suitable donor. However, was made status 7 as he became febrile, last 6/7 T 38. He has been afebrile since and blood cultures from 6/7 with NGTD x 48 hours and his leukocytosis has not worsened. Discussed with transplant ID and since bld cx negative x48hrs he has been re-listed UNOS status 2e.     He had a mild rise in his Cr last week that has improved with gentle hydration and has remained WNL. Stable hyponatremia and mild leukocytosis. He's been afebrile. Overall currently stable. A suitable donor was identified with plan for transplant this afternoon.     Review of studies  TTE 5/21: LV 5.2 cm, LVEF 10-15%, LVOT VTI 10 cm, moderate RV dysfunction, mild AI, minimal MR  EKG: a-BiV paced  [facility] 5/23: patent mLAD stent with slow flow, D1 with 40-50% stenosis, mild disease otherwise  RHC 5/26: RA 12, PA 70/40 (50), PCWP 22, Milana CO/CI 2.3/1.3, MAP 83 with SVR 2469, PVR 12 PERSAUD    Hemodynamics  5/27 (milrinone 0.25): RA 10, PA 76/35 (49), PCWP 34, PA sat 57% with Milana CO/CI 3.1/1.6, MAP 71 with SVR 1574, PVR 4.8  5/28 (on milrinone 0.375): RA 7, PA 63/31, PCWP 26, PA sat 72.8% with Milana CO/CI 4.9/2.5 (CI later dropped to 1.6 in the afternoon), MAP 70 with SVR 1039, PVR 2.9  5/29 (on milrinone 0.5): RA 8, PA 75/32 (50), PCWP 28, PA sat 74% with Milana CO/CI 5.0/2.6, MAP 77 with SVR 1200, PVR 4.4  5/29 (on milrinone 0.5 and nipride to 3 mcg/kg/min): RA 6, PA 59/31 (40), PCWP 30, PA sat 79% with Milana CO/CI 7.0/3.6, BP 97/57 (MAP 70) with , PVR 1.4  6/6 RHC (mil 0.5): RA 11 (v13), RV 75/17, PA 80/36/52, PCWP 24 (v29), PA sat 59.5%, CO/CI (F) 3.58/1.88  6/7 (mil 0.5, IABP 1:1): CVP 5, PA 66/32/45, PA sat 65.7%, CO/CI (F) 4.0/2.1, SVR 2000  6/8 (mil 0.5, IABP 1:1): CVP 1, PA 46/19/28, PA sat 72.7%, CO/CI (F) 5.6/2.9, SVR 1257  6/8 PM (mil 0.5, IABP 1:1): CVP 4, PA 68/25/38, PA sat 68%, CO/CI (f) 5/2.6, SVR 1326   67 YO M with a history of ACC/AHA 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 LHC did not reveal new obstructive CAD and he underwent EPS which did not reveal endocardial substrate amenable for ablation. RHC revealed severely depressed cardiac output and he was transferred to [facility] 5/26 for advanced therapies evaluation.    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 He improved significantly with sequential uptitration of inotropes and increased pacing rate, but on 6/6 he had worsening JAGRUTI. He is s/p RHC which revealed severely elevated filling pressures, severe cpcPH, and CI of 1.9 on milrinone 0.5. IABP was placed and his renal function/PAPs have improved. He is MCS dependent and was inadequately supported with high dose inotropes, so was listed UNOS status 2e for OHT, awaiting suitable donor. However, was made status 7 as he became febrile, last 6/7 T 38. He has been afebrile since and blood cultures from 6/7 with NGTD x 48 hours and his leukocytosis has not worsened. Discussed with transplant ID and since bld cx negative x48hrs he has been re-listed UNOS status 2e.     He had a mild rise in his Cr last week that has improved with gentle hydration and has remained WNL. Stable hyponatremia and mild leukocytosis. He's been afebrile. Overall currently stable. A suitable donor was identified with plan for transplant this afternoon.     Primary cardiologist: Ari Wagner MD    Review of studies  TTE 5/21: LV 5.2 cm, LVEF 10-15%, LVOT VTI 10 cm, moderate RV dysfunction, mild AI, minimal MR  EKG: a-BiV paced  [facility] 5/23: patent mLAD stent with slow flow, D1 with 40-50% stenosis, mild disease otherwise  RHC 5/26: RA 12, PA 70/40 (50), PCWP 22, Milana CO/CI 2.3/1.3, MAP 83 with SVR 2469, PVR 12 PERSAUD    Hemodynamics  5/27 (milrinone 0.25): RA 10, PA 76/35 (49), PCWP 34, PA sat 57% with Milana CO/CI 3.1/1.6, MAP 71 with SVR 1574, PVR 4.8  5/28 (on milrinone 0.375): RA 7, PA 63/31, PCWP 26, PA sat 72.8% with Milana CO/CI 4.9/2.5 (CI later dropped to 1.6 in the afternoon), MAP 70 with SVR 1039, PVR 2.9  5/29 (on milrinone 0.5): RA 8, PA 75/32 (50), PCWP 28, PA sat 74% with Milana CO/CI 5.0/2.6, MAP 77 with SVR 1200, PVR 4.4  5/29 (on milrinone 0.5 and nipride to 3 mcg/kg/min): RA 6, PA 59/31 (40), PCWP 30, PA sat 79% with Milana CO/CI 7.0/3.6, BP 97/57 (MAP 70) with , PVR 1.4  6/6 RHC (mil 0.5): RA 11 (v13), RV 75/17, PA 80/36/52, PCWP 24 (v29), PA sat 59.5%, CO/CI (F) 3.58/1.88  6/7 (mil 0.5, IABP 1:1): CVP 5, PA 66/32/45, PA sat 65.7%, CO/CI (F) 4.0/2.1, SVR 2000  6/8 (mil 0.5, IABP 1:1): CVP 1, PA 46/19/28, PA sat 72.7%, CO/CI (F) 5.6/2.9, SVR 1257  6/8 PM (mil 0.5, IABP 1:1): CVP 4, PA 68/25/38, PA sat 68%, CO/CI (f) 5/2.6, SVR 1326

## 2022-06-21 NOTE — PROGRESS NOTE ADULT - PROBLEM SELECTOR PLAN 5
- hx of VT s/p unsuccessful VT ablation  - continue on amio 200 mg PO QD  - since being admitted to [facility] has not had any episodes of VT, currently tolerating high dose milrinone.  - paced at 80 on telemetry with few PVCs

## 2022-06-21 NOTE — PROGRESS NOTE ADULT - NS MD NEURO CONDITIONS_HEART1
Acute on Chronic

## 2022-06-21 NOTE — PROCEDURE NOTE - ADDITIONAL PROCEDURE DETAILS
Indications: Deactivate ICD for Open heart transplant   - normal sensing and lead impedances  - patient is pacemaker dependent, ventricular paced at 30bpm during sensing test   - BiV Paced 99.7%   - OptiVol fluid index below threshold indicating euvolemia   - Changes made: Mode changed to DOO at 80bpm, ICD tachy therapy deactivated per CT surgery     [id number]

## 2022-06-21 NOTE — PROGRESS NOTE ADULT - PROBLEM SELECTOR PLAN 1
- continue IABP 1:1  - continue milrinone 0.5 mcg/kg/min  - hold Hydralazine and ISDN in anticipation of OR this afternoon. last dose given 6/21 AM  - titrate nipride for assisted means 70-80 on IABP

## 2022-06-22 DIAGNOSIS — Z94.1 HEART TRANSPLANT STATUS: ICD-10-CM

## 2022-06-22 DIAGNOSIS — D84.9 IMMUNODEFICIENCY, UNSPECIFIED: ICD-10-CM

## 2022-06-22 DIAGNOSIS — Z79.2 LONG TERM (CURRENT) USE OF ANTIBIOTICS: ICD-10-CM

## 2022-06-22 LAB
ALBUMIN SERPL ELPH-MCNC: 4.4 G/DL — SIGNIFICANT CHANGE UP (ref 3.3–5)
ALP SERPL-CCNC: 37 U/L — LOW (ref 40–120)
ALT FLD-CCNC: 42 U/L — SIGNIFICANT CHANGE UP (ref 10–45)
ANION GAP SERPL CALC-SCNC: 20 MMOL/L — HIGH (ref 5–17)
APTT BLD: 46.3 SEC — HIGH (ref 27.5–35.5)
AST SERPL-CCNC: 136 U/L — HIGH (ref 10–40)
BASE EXCESS BLDMV CALC-SCNC: 0.3 MMOL/L — SIGNIFICANT CHANGE UP (ref -3–3)
BASE EXCESS BLDMV CALC-SCNC: 1.7 MMOL/L — SIGNIFICANT CHANGE UP (ref -3–3)
BASE EXCESS BLDMV CALC-SCNC: 3 MMOL/L — SIGNIFICANT CHANGE UP (ref -3–3)
BASE EXCESS BLDMV CALC-SCNC: 3.5 MMOL/L — HIGH (ref -3–3)
BASE EXCESS BLDMV CALC-SCNC: 5.2 MMOL/L — HIGH (ref -3–3)
BASE EXCESS BLDMV CALC-SCNC: 5.4 MMOL/L — HIGH (ref -3–3)
BASE EXCESS BLDMV CALC-SCNC: 5.6 MMOL/L — HIGH (ref -3–3)
BASE EXCESS BLDMV CALC-SCNC: 9 MMOL/L — HIGH (ref -3–3)
BASOPHILS # BLD AUTO: 0.02 K/UL — SIGNIFICANT CHANGE UP (ref 0–0.2)
BASOPHILS NFR BLD AUTO: 0.1 % — SIGNIFICANT CHANGE UP (ref 0–2)
BILIRUB SERPL-MCNC: 2.5 MG/DL — HIGH (ref 0.2–1.2)
BUN SERPL-MCNC: 20 MG/DL — SIGNIFICANT CHANGE UP (ref 7–23)
CALCIUM SERPL-MCNC: 10.2 MG/DL — SIGNIFICANT CHANGE UP (ref 8.4–10.5)
CHLORIDE SERPL-SCNC: 104 MMOL/L — SIGNIFICANT CHANGE UP (ref 96–108)
CO2 BLDMV-SCNC: 27 MMOL/L — SIGNIFICANT CHANGE UP (ref 21–29)
CO2 BLDMV-SCNC: 29 MMOL/L — SIGNIFICANT CHANGE UP (ref 21–29)
CO2 BLDMV-SCNC: 29 MMOL/L — SIGNIFICANT CHANGE UP (ref 21–29)
CO2 BLDMV-SCNC: 30 MMOL/L — HIGH (ref 21–29)
CO2 BLDMV-SCNC: 32 MMOL/L — HIGH (ref 21–29)
CO2 SERPL-SCNC: 23 MMOL/L — SIGNIFICANT CHANGE UP (ref 22–31)
CREAT SERPL-MCNC: 1.1 MG/DL — SIGNIFICANT CHANGE UP (ref 0.5–1.3)
CYANIDE SERPL-MCNC: <0.1 MG/L — SIGNIFICANT CHANGE UP
EGFR: 75 ML/MIN/1.73M2 — SIGNIFICANT CHANGE UP
EOSINOPHIL # BLD AUTO: 0 K/UL — SIGNIFICANT CHANGE UP (ref 0–0.5)
EOSINOPHIL NFR BLD AUTO: 0 % — SIGNIFICANT CHANGE UP (ref 0–6)
GAS PNL BLDA: SIGNIFICANT CHANGE UP
GAS PNL BLDMV: SIGNIFICANT CHANGE UP
GLUCOSE BLDC GLUCOMTR-MCNC: 103 MG/DL — HIGH (ref 70–99)
GLUCOSE BLDC GLUCOMTR-MCNC: 109 MG/DL — HIGH (ref 70–99)
GLUCOSE BLDC GLUCOMTR-MCNC: 113 MG/DL — HIGH (ref 70–99)
GLUCOSE BLDC GLUCOMTR-MCNC: 116 MG/DL — HIGH (ref 70–99)
GLUCOSE BLDC GLUCOMTR-MCNC: 118 MG/DL — HIGH (ref 70–99)
GLUCOSE BLDC GLUCOMTR-MCNC: 123 MG/DL — HIGH (ref 70–99)
GLUCOSE BLDC GLUCOMTR-MCNC: 126 MG/DL — HIGH (ref 70–99)
GLUCOSE BLDC GLUCOMTR-MCNC: 127 MG/DL — HIGH (ref 70–99)
GLUCOSE BLDC GLUCOMTR-MCNC: 127 MG/DL — HIGH (ref 70–99)
GLUCOSE BLDC GLUCOMTR-MCNC: 129 MG/DL — HIGH (ref 70–99)
GLUCOSE BLDC GLUCOMTR-MCNC: 131 MG/DL — HIGH (ref 70–99)
GLUCOSE BLDC GLUCOMTR-MCNC: 134 MG/DL — HIGH (ref 70–99)
GLUCOSE BLDC GLUCOMTR-MCNC: 138 MG/DL — HIGH (ref 70–99)
GLUCOSE BLDC GLUCOMTR-MCNC: 145 MG/DL — HIGH (ref 70–99)
GLUCOSE BLDC GLUCOMTR-MCNC: 149 MG/DL — HIGH (ref 70–99)
GLUCOSE BLDC GLUCOMTR-MCNC: 150 MG/DL — HIGH (ref 70–99)
GLUCOSE BLDC GLUCOMTR-MCNC: 176 MG/DL — HIGH (ref 70–99)
GLUCOSE BLDC GLUCOMTR-MCNC: 184 MG/DL — HIGH (ref 70–99)
GLUCOSE BLDC GLUCOMTR-MCNC: 214 MG/DL — HIGH (ref 70–99)
GLUCOSE BLDC GLUCOMTR-MCNC: 225 MG/DL — HIGH (ref 70–99)
GLUCOSE BLDC GLUCOMTR-MCNC: 228 MG/DL — HIGH (ref 70–99)
GLUCOSE SERPL-MCNC: 217 MG/DL — HIGH (ref 70–99)
GRAM STN FLD: SIGNIFICANT CHANGE UP
GRAM STN FLD: SIGNIFICANT CHANGE UP
HCO3 BLDMV-SCNC: 25 MMOL/L — SIGNIFICANT CHANGE UP (ref 20–28)
HCO3 BLDMV-SCNC: 28 MMOL/L — SIGNIFICANT CHANGE UP (ref 20–28)
HCO3 BLDMV-SCNC: 29 MMOL/L — HIGH (ref 20–28)
HCO3 BLDMV-SCNC: 31 MMOL/L — HIGH (ref 20–28)
HCT VFR BLD CALC: 28.6 % — LOW (ref 39–50)
HGB BLD-MCNC: 9.6 G/DL — LOW (ref 13–17)
HOROWITZ INDEX BLDMV+IHG-RTO: 40 — SIGNIFICANT CHANGE UP
IMM GRANULOCYTES NFR BLD AUTO: 1 % — SIGNIFICANT CHANGE UP (ref 0–1.5)
INR BLD: 1.37 RATIO — HIGH (ref 0.88–1.16)
LYMPHOCYTES # BLD AUTO: 0.64 K/UL — LOW (ref 1–3.3)
LYMPHOCYTES # BLD AUTO: 4.4 % — LOW (ref 13–44)
MAGNESIUM SERPL-MCNC: 2.3 MG/DL — SIGNIFICANT CHANGE UP (ref 1.6–2.6)
MCHC RBC-ENTMCNC: 30.3 PG — SIGNIFICANT CHANGE UP (ref 27–34)
MCHC RBC-ENTMCNC: 33.6 GM/DL — SIGNIFICANT CHANGE UP (ref 32–36)
MCV RBC AUTO: 90.2 FL — SIGNIFICANT CHANGE UP (ref 80–100)
MONOCYTES # BLD AUTO: 0.89 K/UL — SIGNIFICANT CHANGE UP (ref 0–0.9)
MONOCYTES NFR BLD AUTO: 6.1 % — SIGNIFICANT CHANGE UP (ref 2–14)
NEUTROPHILS # BLD AUTO: 12.98 K/UL — HIGH (ref 1.8–7.4)
NEUTROPHILS NFR BLD AUTO: 88.4 % — HIGH (ref 43–77)
NIGHT BLUE STAIN TISS: SIGNIFICANT CHANGE UP
NIGHT BLUE STAIN TISS: SIGNIFICANT CHANGE UP
NRBC # BLD: 0 /100 WBCS — SIGNIFICANT CHANGE UP (ref 0–0)
O2 CT VFR BLD CALC: 36 MMHG — SIGNIFICANT CHANGE UP (ref 30–65)
O2 CT VFR BLD CALC: 38 MMHG — SIGNIFICANT CHANGE UP (ref 30–65)
O2 CT VFR BLD CALC: 40 MMHG — SIGNIFICANT CHANGE UP (ref 30–65)
O2 CT VFR BLD CALC: 42 MMHG — SIGNIFICANT CHANGE UP (ref 30–65)
O2 CT VFR BLD CALC: 44 MMHG — SIGNIFICANT CHANGE UP (ref 30–65)
O2 CT VFR BLD CALC: 48 MMHG — SIGNIFICANT CHANGE UP (ref 30–65)
O2 CT VFR BLD CALC: 48 MMHG — SIGNIFICANT CHANGE UP (ref 30–65)
O2 CT VFR BLD CALC: 50 MMHG — SIGNIFICANT CHANGE UP (ref 30–65)
PCO2 BLDMV: 32 MMHG — SIGNIFICANT CHANGE UP (ref 30–65)
PCO2 BLDMV: 36 MMHG — SIGNIFICANT CHANGE UP (ref 30–65)
PCO2 BLDMV: 38 MMHG — SIGNIFICANT CHANGE UP (ref 30–65)
PCO2 BLDMV: 39 MMHG — SIGNIFICANT CHANGE UP (ref 30–65)
PCO2 BLDMV: 40 MMHG — SIGNIFICANT CHANGE UP (ref 30–65)
PCO2 BLDMV: 42 MMHG — SIGNIFICANT CHANGE UP (ref 30–65)
PCO2 BLDMV: 46 MMHG — SIGNIFICANT CHANGE UP (ref 30–65)
PCO2 BLDMV: 48 MMHG — SIGNIFICANT CHANGE UP (ref 30–65)
PH BLDMV: 7.37 — SIGNIFICANT CHANGE UP (ref 7.32–7.45)
PH BLDMV: 7.39 — SIGNIFICANT CHANGE UP (ref 7.32–7.45)
PH BLDMV: 7.4 — SIGNIFICANT CHANGE UP (ref 7.32–7.45)
PH BLDMV: 7.45 — SIGNIFICANT CHANGE UP (ref 7.32–7.45)
PH BLDMV: 7.48 — HIGH (ref 7.32–7.45)
PH BLDMV: 7.49 — HIGH (ref 7.32–7.45)
PH BLDMV: 7.51 — HIGH (ref 7.32–7.45)
PH BLDMV: 7.59 — HIGH (ref 7.32–7.45)
PHOSPHATE SERPL-MCNC: 2.1 MG/DL — LOW (ref 2.5–4.5)
PLATELET # BLD AUTO: 161 K/UL — SIGNIFICANT CHANGE UP (ref 150–400)
POTASSIUM SERPL-MCNC: 3.8 MMOL/L — SIGNIFICANT CHANGE UP (ref 3.5–5.3)
POTASSIUM SERPL-SCNC: 3.8 MMOL/L — SIGNIFICANT CHANGE UP (ref 3.5–5.3)
PROT SERPL-MCNC: 5.8 G/DL — LOW (ref 6–8.3)
PROTHROM AB SERPL-ACNC: 16 SEC — HIGH (ref 10.5–13.4)
RBC # BLD: 3.17 M/UL — LOW (ref 4.2–5.8)
RBC # FLD: 16.4 % — HIGH (ref 10.3–14.5)
SAO2 % BLDMV: 63.7 — SIGNIFICANT CHANGE UP (ref 60–90)
SAO2 % BLDMV: 66 — SIGNIFICANT CHANGE UP (ref 60–90)
SAO2 % BLDMV: 67.3 — SIGNIFICANT CHANGE UP (ref 60–90)
SAO2 % BLDMV: 71.3 — SIGNIFICANT CHANGE UP (ref 60–90)
SAO2 % BLDMV: 75.1 — SIGNIFICANT CHANGE UP (ref 60–90)
SAO2 % BLDMV: 77.5 — SIGNIFICANT CHANGE UP (ref 60–90)
SAO2 % BLDMV: 78.1 — SIGNIFICANT CHANGE UP (ref 60–90)
SAO2 % BLDMV: 79 — SIGNIFICANT CHANGE UP (ref 60–90)
SODIUM SERPL-SCNC: 147 MMOL/L — HIGH (ref 135–145)
SPECIMEN SOURCE: SIGNIFICANT CHANGE UP
VANCOMYCIN TROUGH SERPL-MCNC: 9.9 UG/ML — LOW (ref 10–20)
WBC # BLD: 14.67 K/UL — HIGH (ref 3.8–10.5)
WBC # FLD AUTO: 14.67 K/UL — HIGH (ref 3.8–10.5)

## 2022-06-22 PROCEDURE — 36620 INSERTION CATHETER ARTERY: CPT

## 2022-06-22 PROCEDURE — 71045 X-RAY EXAM CHEST 1 VIEW: CPT | Mod: 26

## 2022-06-22 PROCEDURE — 99291 CRITICAL CARE FIRST HOUR: CPT

## 2022-06-22 PROCEDURE — 99233 SBSQ HOSP IP/OBS HIGH 50: CPT

## 2022-06-22 PROCEDURE — 33968 REMOVE AORTIC ASSIST DEVICE: CPT

## 2022-06-22 PROCEDURE — 99292 CRITICAL CARE ADDL 30 MIN: CPT

## 2022-06-22 PROCEDURE — 93010 ELECTROCARDIOGRAM REPORT: CPT

## 2022-06-22 PROCEDURE — 99292 CRITICAL CARE ADDL 30 MIN: CPT | Mod: 25

## 2022-06-22 PROCEDURE — 99291 CRITICAL CARE FIRST HOUR: CPT | Mod: 25

## 2022-06-22 RX ORDER — POTASSIUM CHLORIDE 20 MEQ
10 PACKET (EA) ORAL
Refills: 0 | Status: COMPLETED | OUTPATIENT
Start: 2022-06-22 | End: 2022-06-22

## 2022-06-22 RX ORDER — POTASSIUM PHOSPHATE, MONOBASIC POTASSIUM PHOSPHATE, DIBASIC 236; 224 MG/ML; MG/ML
30 INJECTION, SOLUTION INTRAVENOUS ONCE
Refills: 0 | Status: COMPLETED | OUTPATIENT
Start: 2022-06-22 | End: 2022-06-22

## 2022-06-22 RX ORDER — ACETAMINOPHEN 500 MG
650 TABLET ORAL EVERY 6 HOURS
Refills: 0 | Status: COMPLETED | OUTPATIENT
Start: 2022-06-22 | End: 2022-06-29

## 2022-06-22 RX ORDER — HEPARIN SODIUM 5000 [USP'U]/ML
5000 INJECTION INTRAVENOUS; SUBCUTANEOUS EVERY 8 HOURS
Refills: 0 | Status: COMPLETED | OUTPATIENT
Start: 2022-06-22 | End: 2022-06-27

## 2022-06-22 RX ORDER — ACETAMINOPHEN 500 MG
1000 TABLET ORAL ONCE
Refills: 0 | Status: COMPLETED | OUTPATIENT
Start: 2022-06-22 | End: 2022-06-22

## 2022-06-22 RX ORDER — HYDROMORPHONE HYDROCHLORIDE 2 MG/ML
0.5 INJECTION INTRAMUSCULAR; INTRAVENOUS; SUBCUTANEOUS ONCE
Refills: 0 | Status: DISCONTINUED | OUTPATIENT
Start: 2022-06-22 | End: 2022-06-22

## 2022-06-22 RX ORDER — FUROSEMIDE 40 MG
10 TABLET ORAL ONCE
Refills: 0 | Status: COMPLETED | OUTPATIENT
Start: 2022-06-22 | End: 2022-06-22

## 2022-06-22 RX ORDER — TACROLIMUS 5 MG/1
0.5 CAPSULE ORAL
Refills: 0 | Status: DISCONTINUED | OUTPATIENT
Start: 2022-06-22 | End: 2022-06-23

## 2022-06-22 RX ORDER — MILRINONE LACTATE 1 MG/ML
0.1 INJECTION, SOLUTION INTRAVENOUS
Qty: 20 | Refills: 0 | Status: DISCONTINUED | OUTPATIENT
Start: 2022-06-22 | End: 2022-06-29

## 2022-06-22 RX ORDER — TRAMADOL HYDROCHLORIDE 50 MG/1
25 TABLET ORAL EVERY 8 HOURS
Refills: 0 | Status: DISCONTINUED | OUTPATIENT
Start: 2022-06-22 | End: 2022-06-27

## 2022-06-22 RX ORDER — CALCIUM GLUCONATE 100 MG/ML
2 VIAL (ML) INTRAVENOUS ONCE
Refills: 0 | Status: COMPLETED | OUTPATIENT
Start: 2022-06-22 | End: 2022-06-22

## 2022-06-22 RX ORDER — SODIUM BICARBONATE 1 MEQ/ML
100 SYRINGE (ML) INTRAVENOUS ONCE
Refills: 0 | Status: COMPLETED | OUTPATIENT
Start: 2022-06-22 | End: 2022-06-22

## 2022-06-22 RX ORDER — GABAPENTIN 400 MG/1
100 CAPSULE ORAL
Refills: 0 | Status: COMPLETED | OUTPATIENT
Start: 2022-06-22 | End: 2022-06-29

## 2022-06-22 RX ORDER — CHLORHEXIDINE GLUCONATE 213 G/1000ML
1 SOLUTION TOPICAL
Refills: 0 | Status: DISCONTINUED | OUTPATIENT
Start: 2022-06-22 | End: 2022-07-08

## 2022-06-22 RX ORDER — ALBUMIN HUMAN 25 %
250 VIAL (ML) INTRAVENOUS ONCE
Refills: 0 | Status: COMPLETED | OUTPATIENT
Start: 2022-06-22 | End: 2022-06-22

## 2022-06-22 RX ADMIN — Medication 125 MILLIGRAM(S): at 13:20

## 2022-06-22 RX ADMIN — INSULIN HUMAN 3 UNIT(S)/HR: 100 INJECTION, SOLUTION SUBCUTANEOUS at 21:32

## 2022-06-22 RX ADMIN — HEPARIN SODIUM 5000 UNIT(S): 5000 INJECTION INTRAVENOUS; SUBCUTANEOUS at 21:31

## 2022-06-22 RX ADMIN — Medication 50 MILLIEQUIVALENT(S): at 00:00

## 2022-06-22 RX ADMIN — TRAMADOL HYDROCHLORIDE 25 MILLIGRAM(S): 50 TABLET ORAL at 21:31

## 2022-06-22 RX ADMIN — MYCOPHENOLATE MOFETIL 83.34 MILLIGRAM(S): 250 CAPSULE ORAL at 20:02

## 2022-06-22 RX ADMIN — TACROLIMUS 0.5 MILLIGRAM(S): 5 CAPSULE ORAL at 20:02

## 2022-06-22 RX ADMIN — MYCOPHENOLATE MOFETIL 83.34 MILLIGRAM(S): 250 CAPSULE ORAL at 07:46

## 2022-06-22 RX ADMIN — GABAPENTIN 100 MILLIGRAM(S): 400 CAPSULE ORAL at 17:17

## 2022-06-22 RX ADMIN — TRAMADOL HYDROCHLORIDE 25 MILLIGRAM(S): 50 TABLET ORAL at 17:17

## 2022-06-22 RX ADMIN — HYDROMORPHONE HYDROCHLORIDE 0.5 MILLIGRAM(S): 2 INJECTION INTRAMUSCULAR; INTRAVENOUS; SUBCUTANEOUS at 05:33

## 2022-06-22 RX ADMIN — Medication 200 GRAM(S): at 00:29

## 2022-06-22 RX ADMIN — MUPIROCIN 1 APPLICATION(S): 20 OINTMENT TOPICAL at 17:26

## 2022-06-22 RX ADMIN — POTASSIUM PHOSPHATE, MONOBASIC POTASSIUM PHOSPHATE, DIBASIC 83.33 MILLIMOLE(S): 236; 224 INJECTION, SOLUTION INTRAVENOUS at 02:39

## 2022-06-22 RX ADMIN — TACROLIMUS 0.5 MILLIGRAM(S): 5 CAPSULE ORAL at 10:23

## 2022-06-22 RX ADMIN — GABAPENTIN 100 MILLIGRAM(S): 400 CAPSULE ORAL at 10:23

## 2022-06-22 RX ADMIN — Medication 125 MILLIGRAM(S): at 20:03

## 2022-06-22 RX ADMIN — Medication 100 MILLIEQUIVALENT(S): at 00:28

## 2022-06-22 RX ADMIN — CHLORHEXIDINE GLUCONATE 15 MILLILITER(S): 213 SOLUTION TOPICAL at 05:19

## 2022-06-22 RX ADMIN — Medication 650 MILLIGRAM(S): at 17:17

## 2022-06-22 RX ADMIN — Medication 5.47 MICROGRAM(S)/KG/MIN: at 21:32

## 2022-06-22 RX ADMIN — Medication 10 MILLIGRAM(S): at 21:31

## 2022-06-22 RX ADMIN — Medication 125 MILLIGRAM(S): at 21:31

## 2022-06-22 RX ADMIN — POLYETHYLENE GLYCOL 3350 17 GRAM(S): 17 POWDER, FOR SOLUTION ORAL at 11:46

## 2022-06-22 RX ADMIN — HYDROMORPHONE HYDROCHLORIDE 0.5 MILLIGRAM(S): 2 INJECTION INTRAMUSCULAR; INTRAVENOUS; SUBCUTANEOUS at 05:18

## 2022-06-22 RX ADMIN — Medication 125 MILLILITER(S): at 01:31

## 2022-06-22 RX ADMIN — Medication 250 MILLIGRAM(S): at 11:54

## 2022-06-22 RX ADMIN — TRAMADOL HYDROCHLORIDE 25 MILLIGRAM(S): 50 TABLET ORAL at 10:23

## 2022-06-22 RX ADMIN — Medication 400 MILLIGRAM(S): at 11:00

## 2022-06-22 RX ADMIN — PANTOPRAZOLE SODIUM 40 MILLIGRAM(S): 20 TABLET, DELAYED RELEASE ORAL at 11:46

## 2022-06-22 RX ADMIN — CEFEPIME 100 MILLIGRAM(S): 1 INJECTION, POWDER, FOR SOLUTION INTRAMUSCULAR; INTRAVENOUS at 21:32

## 2022-06-22 RX ADMIN — MUPIROCIN 1 APPLICATION(S): 20 OINTMENT TOPICAL at 05:30

## 2022-06-22 RX ADMIN — TRAMADOL HYDROCHLORIDE 25 MILLIGRAM(S): 50 TABLET ORAL at 22:01

## 2022-06-22 RX ADMIN — Medication 125 MILLIGRAM(S): at 05:18

## 2022-06-22 RX ADMIN — Medication 50 MILLIEQUIVALENT(S): at 00:32

## 2022-06-22 RX ADMIN — CEFEPIME 100 MILLIGRAM(S): 1 INJECTION, POWDER, FOR SOLUTION INTRAMUSCULAR; INTRAVENOUS at 05:18

## 2022-06-22 RX ADMIN — Medication 1000 MILLIGRAM(S): at 11:30

## 2022-06-22 RX ADMIN — CEFEPIME 100 MILLIGRAM(S): 1 INJECTION, POWDER, FOR SOLUTION INTRAMUSCULAR; INTRAVENOUS at 13:20

## 2022-06-22 RX ADMIN — Medication 50 MILLIEQUIVALENT(S): at 00:30

## 2022-06-22 RX ADMIN — MILRINONE LACTATE 8.75 MICROGRAM(S)/KG/MIN: 1 INJECTION, SOLUTION INTRAVENOUS at 21:32

## 2022-06-22 RX ADMIN — Medication 125 MILLIGRAM(S): at 07:59

## 2022-06-22 RX ADMIN — Medication 250 MILLIGRAM(S): at 00:01

## 2022-06-22 NOTE — PROGRESS NOTE ADULT - SUBJECTIVE AND OBJECTIVE BOX
CRITICAL CARE ATTENDING - CTICU    MEDICATIONS  (STANDING):  cefepime   IVPB 1000 milliGRAM(s) IV Intermittent every 8 hours  chlorhexidine 0.12% Liquid 15 milliLiter(s) Oral Mucosa every 12 hours  dexMEDEtomidine Infusion 0.3 MICROgram(s)/kG/Hr (5.47 mL/Hr) IV Continuous <Continuous>  dextrose 50% Injectable 50 milliLiter(s) IV Push every 15 minutes  dextrose 50% Injectable 25 milliLiter(s) IV Push every 15 minutes  DOBUTamine Infusion 2.5 MICROgram(s)/kG/Min (5.47 mL/Hr) IV Continuous <Continuous>  insulin regular Infusion 3 Unit(s)/Hr (3 mL/Hr) IV Continuous <Continuous>  methylPREDNISolone sodium succinate Injectable 125 milliGRAM(s) IV Push every 8 hours  milrinone Infusion 0.4 MICROgram(s)/kG/Min (8.75 mL/Hr) IV Continuous <Continuous>  mupirocin 2% Nasal 1 Application(s) Both Nostrils every 12 hours  mycophenolate mofetil IVPB 1000 milliGRAM(s) IV Intermittent <User Schedule>  norepinephrine Infusion 0.05 MICROgram(s)/kG/Min (6.83 mL/Hr) IV Continuous <Continuous>  pantoprazole  Injectable 40 milliGRAM(s) IV Push daily  polyethylene glycol 3350 17 Gram(s) Oral daily  sodium chloride 0.9%. 1000 milliLiter(s) (10 mL/Hr) IV Continuous <Continuous>  vancomycin    Solution 125 milliGRAM(s) Oral every 12 hours  vancomycin  IVPB 750 milliGRAM(s) IV Intermittent every 12 hours                                    9.6    14.67 )-----------( 161      ( 2022 01:10 )             28.6       -    147<H>  |  104  |  20  ----------------------------<  217<H>  3.8   |  23  |  1.10    Ca    10.2      2022 01:10  Phos  2.1     06-  Mg     2.3     -    TPro  5.8<L>  /  Alb  4.4  /  TBili  2.5<H>  /  DBili  x   /  AST  136<H>  /  ALT  42  /  AlkPhos  37<L>  -      PT/INR - ( 2022 01:10 )   PT: 16.0 sec;   INR: 1.37 ratio         PTT - ( 2022 01:10 )  PTT:46.3 sec    Mode: AC/ CMV (Assist Control/ Continuous Mandatory Ventilation)  RR (machine): 14  TV (machine): 550  FiO2: 40  PEEP: 5  ITime: 1  MAP: 10  PIP: 25      Daily Height in cm: 177.8 (2022 11:22)    Daily Weight in k.1 (2022 00:00)       @ 07:01  -   @ 07:00  --------------------------------------------------------  IN: 2185.2 mL / OUT: 2060 mL / NET: 125.2 mL     @ 07:01  -   @ 09:18  --------------------------------------------------------  IN: 152.9 mL / OUT: 290 mL / NET: -137.1 mL        Critically Ill patient  : [ ] preoperative ,   [x] post operative    Requires :  [x] Arterial Line   [x] Central Line  [ ] PA catheter  [x] IABP  [ ] ECMO  [ ] LVAD  [x] Ventilator  [x] pacemaker- TPM [ ] Impella [x] Maricruz                       [x ] ABG's     [ x] Pulse Oxymetry Monitoring  Bedside evaluation , monitoring , treatment of hemodynamics , fluids , IVP/ IVCD meds.        Diagnosis:     POD 1- OHT & AICD removal     Chest tube drainage     Requires chest PT, pulmonary toilet, ambu bagging, suctioning to maintain SaO2,  patent airway and treat atelectasis.     IABP   [x] management   [x] wean 1:1 1:2 1:3   [ ] removal and f/u vascular checks     Ventilator Management:  [x ]AC-rest    [ ]CPAP-PS Wean    [ ]Trach Collar     [ ]Extubate    [ ] T-Piece  [ ]peep>5     Temporary pacemaker (TPM) interrogation and setting.     Maricruz- 10ppm     Hemodynamic lability,  instability. Requires IVCD [x] vasopressors [x] inotropes  [ ] vasodilator  [x] IVSS fluid  to maintain MAP, perfusion, C.I.     Sedated with IVCD Precedex to maintain vent synchrony     Hypotension     DM2- IVCD insulin     Hypernatremia     Hypovolemia     Requires bedside physical therapy, mobilization and total [facility] care.         By signing my name below, I, John Falcon, attest that this documentation has been prepared under the direction and in the presence of Julius Kinney MD.   Electronically Signed: Mini Guo 22 @ 09:18      Discussed with CT surgeon, Physician Assistant - Nurse Practitioner- Critical care medicine team.   Discussed at  AM / PM rounds.   Chart, labs , films reviewed.    Cumulative Critical Care Time Given Today:  CRITICAL CARE ATTENDING - CTICU    MEDICATIONS  (STANDING):  cefepime   IVPB 1000 milliGRAM(s) IV Intermittent every 8 hours  chlorhexidine 0.12% Liquid 15 milliLiter(s) Oral Mucosa every 12 hours  dexMEDEtomidine Infusion 0.3 MICROgram(s)/kG/Hr (5.47 mL/Hr) IV Continuous <Continuous>  dextrose 50% Injectable 50 milliLiter(s) IV Push every 15 minutes  dextrose 50% Injectable 25 milliLiter(s) IV Push every 15 minutes  DOBUTamine Infusion 2.5 MICROgram(s)/kG/Min (5.47 mL/Hr) IV Continuous <Continuous>  insulin regular Infusion 3 Unit(s)/Hr (3 mL/Hr) IV Continuous <Continuous>  methylPREDNISolone sodium succinate Injectable 125 milliGRAM(s) IV Push every 8 hours  milrinone Infusion 0.4 MICROgram(s)/kG/Min (8.75 mL/Hr) IV Continuous <Continuous>  mupirocin 2% Nasal 1 Application(s) Both Nostrils every 12 hours  mycophenolate mofetil IVPB 1000 milliGRAM(s) IV Intermittent <User Schedule>  norepinephrine Infusion 0.05 MICROgram(s)/kG/Min (6.83 mL/Hr) IV Continuous <Continuous>  pantoprazole  Injectable 40 milliGRAM(s) IV Push daily  polyethylene glycol 3350 17 Gram(s) Oral daily  sodium chloride 0.9%. 1000 milliLiter(s) (10 mL/Hr) IV Continuous <Continuous>  vancomycin    Solution 125 milliGRAM(s) Oral every 12 hours  vancomycin  IVPB 750 milliGRAM(s) IV Intermittent every 12 hours                                    9.6    14.67 )-----------( 161      ( 2022 01:10 )             28.6       -    147<H>  |  104  |  20  ----------------------------<  217<H>  3.8   |  23  |  1.10    Ca    10.2      2022 01:10  Phos  2.1     06-  Mg     2.3     -    TPro  5.8<L>  /  Alb  4.4  /  TBili  2.5<H>  /  DBili  x   /  AST  136<H>  /  ALT  42  /  AlkPhos  37<L>  -      PT/INR - ( 2022 01:10 )   PT: 16.0 sec;   INR: 1.37 ratio         PTT - ( 2022 01:10 )  PTT:46.3 sec    Mode: AC/ CMV (Assist Control/ Continuous Mandatory Ventilation)  RR (machine): 14  TV (machine): 550  FiO2: 40  PEEP: 5  ITime: 1  MAP: 10  PIP: 25      Daily Height in cm: 177.8 (2022 11:22)    Daily Weight in k.1 (2022 00:00)       @ 07:01  -   @ 07:00  --------------------------------------------------------  IN: 2185.2 mL / OUT: 2060 mL / NET: 125.2 mL     @ 07:01  -   @ 09:18  --------------------------------------------------------  IN: 152.9 mL / OUT: 290 mL / NET: -137.1 mL        Critically Ill patient  : [ ] preoperative ,   [x] post operative    Requires :  [x] Arterial Line   [x] Central Line  [x ] PA catheter  [x] IABP  [ ] ECMO  [ ] LVAD  [x] Ventilator  [x] pacemaker- TPM [ ] Impella [x] Maricruz                       [x ] ABG's     [ x] Pulse Oxymetry Monitoring  Bedside evaluation , monitoring , treatment of hemodynamics , fluids , IVP/ IVCD meds.        Diagnosis:     POD 1- Heart Transplant  & AICD removal     Cardiogenic Shock - post op     CHF- acute [ x]   chronic [ ]    systolic [ x]   diatolic [x ]          - Echo- EF -  post op           [ ] RV dysfunction          - Cxr-cardiomegally, edema          - Clinical-  [ x]inotropes   [x ]pressors   [ ]diuresis   [x ]IABP   [ ]ECMO   [ ]LVAD   [x ] ventilator       -     Chest tube drainage     Requires chest PT, pulmonary toilet, ambu bagging, suctioning to maintain SaO2,  patent airway and treat atelectasis.     Ventilator Management:  [x ]AC-rest    [x ]CPAP-PS Wean    [ ]Trach Collar     [ ]Extubate    [ ] T-Piece  [ ]peep>5     IABP   [x] management   [x] wean 1:1 1:2 1:3   [x ] removal and f/u vascular checks      Temporary pacemaker (TPM) interrogation and setting.     Maricruz- 10ppm     Hemodynamic lability,  instability. Requires IVCD [x] vasopressors [x] inotropes  [ ] vasodilator  [x] IVSS fluid  to maintain MAP, perfusion, C.I.     [place] Serafin catheter interpretation and therapeutic management of unstable hemodynamics     Sedated with IVCD Precedex to maintain vent synchrony     Hypotension     DM2- IVCD insulin     Hypernatremia     Hypovolemia     Requires bedside physical therapy, mobilization and total assisted care.         By signing my name below, I, John Falcon, attest that this documentation has been prepared under the direction and in the presence of Julius Kinney MD.   Electronically Signed: Mini Guo 22 @ 09:18    I, Julius Kinney, personally performed the services described in this documentation. All medical record entries made by the scribe were at my direction and in my presence. I have reviewed the chart and agree that the record reflects my personal performance and is accurate and complete.   Julius Kinney MD.       Discussed with CT surgeon, Physician Assistant - Nurse Practitioner- Critical care medicine team.   Discussed at  AM / PM rounds.   Chart, labs , films reviewed.    Cumulative Critical Care Time Given Today:  90 min

## 2022-06-22 NOTE — PROGRESS NOTE ADULT - SUBJECTIVE AND OBJECTIVE BOX
Patient seen and examined at the bedside.    Remained critically ill on continuous ICU monitoring.    OBJECTIVE:  Vital Signs Last 24 Hrs  T(C): 36.1 (22 Jun 2022 16:00), Max: 37.5 (22 Jun 2022 00:00)  T(F): 97 (22 Jun 2022 16:00), Max: 99.5 (22 Jun 2022 00:00)  HR: 101 (22 Jun 2022 19:00) (82 - 118)  BP: 118/80 (21 Jun 2022 20:45) (118/80 - 118/80)  BP(mean): 94 (21 Jun 2022 20:45) (94 - 94)  RR: 17 (22 Jun 2022 19:00) (14 - 45)  SpO2: 100% (22 Jun 2022 19:00) (99% - 100%)    REVIEW OF SYSTEMS:   [x ] N/A    Physical Exam:  General: Sedated with HFNC   Neurology: sedated   Eyes: bilateral pupils equal and reactive   ENT/Neck:  Neck supple,No JVD   Respiratory: Rales noted bilaterally   CV: RRR, S1S2, no murmurs        [x] Sternal dressing, [x] Mediastinal CT x3,         [x] Sinus rhythm,[x] Temporary pacing - DDD box off   Abdominal: Soft, NT, ND +BS,   Extremities: 1-2+ pedal edema noted, + peripheral pulses   Skin: No Rashes, Hematoma, Ecchymosis                           Assessment:HPI:  66M Mixed Ischemic/NICM Cardiomyopathy (EF 25-30% at baseline, s/p BIV-ICD), CAD (medically managed MIs in 2008,2011, Most recent stent in April 2022 to mLAD)   Heart failure s/p OHT and AICD removal on 6/21    Plan:   ***Neuro***    [x] Sedated with [x] Precedex   Post operative neuro assessment     ***Cardiovascular***  Heart failure s/p OHT and AICD removal on 6/21  Invasive hemodynamic monitoring, assess perfusion indices   SR / CVP 9/ MAP 75/ Hct 28/ Lactate 1.2   [x] Levophed - currently off [x] Dobutamine - 2.5mcg [X] Primacor - 0.4mcg [x] IABP - dc'ed today    Continuos reassessment of hemodynamics  Monitor chest tube outputs   [x] Bleeding   [x] VTE prophylaxis w/ subq heparin  Serial EKG and cardiac enzymes   Patient on immunosuppressive medication   Patient is on steroids      ***Pulmonary***   [x] HFO2 30L/40% [x] Nitric oxide 20ppm   Patient was weaned and extubated off of CPAP to HFNC today     ***GI***  [x] NPO  [x] Protonix    Bowel regimen with Miralax.    ***Renal***  GFR 75 / [x] CKD  Continue to monitor I/Os, BUN/Creatinine.   Replete lytes PRN  Hernandez present  [x] positive     ***ID***  Perioperative antibitoics with Cefepime and Vancomycin   Mupirocin for mrsa prophylaxis     ***Endocrine***   [x] DM2  HbA1c 6.2%               - [x] Insulin gtt              - Need tight glycemic control to prevent wound infection.    Patient requires continuous monitoring with bedside rhythm monitoring, pulse oximetry monitoring, and continuous central venous and arterial pressure monitoring; and intermittent blood gas analysis. Care plan discussed with the ICU care team.   Patient remained critical, at risk for life threatening decompensation.    I have spent 30 minutes providing critical care management to this patient.    By signing my name below, I, Rose Arias, attest that this documentation has been prepared under the direction and in the presence of Rommel Bautista MD   Electronically signed: Mini Goddard, 06-22-22 @ 19:26    I, Rommel Bautista, personally performed the services described in this documentation. all medical record entries made by the nelidaibshira were at my direction and in my presence. I have reviewed the chart and agree that the record reflects my personal performance and is accurate and complete  Electronically signed: Rommel Bautista MD  Patient seen and examined at the bedside.    Remained critically ill on continuous ICU monitoring.    OBJECTIVE:  Vital Signs Last 24 Hrs  T(C): 36.1 (22 Jun 2022 16:00), Max: 37.5 (22 Jun 2022 00:00)  T(F): 97 (22 Jun 2022 16:00), Max: 99.5 (22 Jun 2022 00:00)  HR: 101 (22 Jun 2022 19:00) (82 - 118)  BP: 118/80 (21 Jun 2022 20:45) (118/80 - 118/80)  BP(mean): 94 (21 Jun 2022 20:45) (94 - 94)  RR: 17 (22 Jun 2022 19:00) (14 - 45)  SpO2: 100% (22 Jun 2022 19:00) (99% - 100%)    REVIEW OF SYSTEMS:   + incisional chest pain      Physical Exam:  General: in bed with multiple lines , gtt & tubes    Neurology: Alert awake F/u all commands   Eyes: bilateral pupils equal and reactive   ENT/Neck:  Neck supple JVD   Respiratory: fine Rales noted bilaterally   CV: RRR, S1S2, no murmurs        [x] Sternal dressing, [x] Mediastinal CT x3,         [x] Sinus rhythm,[x] Temporary pacing - DDD box off   Abdominal: Soft, NT, ND +BS,   Extremities: 1-2+ pedal edema noted, + peripheral pulses   Skin: No Rashes, Hematoma, Ecchymosis                           Assessment:            66 yr old Male H/O HTN / NICM / AICD / DM2 / chronic kidney disease        S/P Heart Tx D # 1       Post Tx cardiogenic dysfunction / Cardiogenic shock / requiring dual inotrope        Hyperglycemia / hypophosphatemia / hypokalemia        Immunosuppression      Plan:   ***Neuro***       Post operative neuro assessment     ***Cardiovascular***    Invasive hemodynamic monitoring   / CVP 7-9/ MAP 75/ PAP 46/16 (30) / CI 2.7 / Hct 28/ Lactate 1.2 / ScVO2 77   [x] Levophed (off)  [x] Dobutamine - 2.5mcg/kg/min  [X] Primacor - 0.4mcg/mcg/kg/min  [x] IABP - dc'ed today  [x] Nitric oxide 20 PPM   overall improving hemodynamics will wean Nitric oxide & monitor PAP   AV Pacing box currently off / no Arrhthymias   maintain K >4 / Mg >2 / Phos >2.4    Monitor chest tube outputs   Tacro / Cellcept / Steroids           ***Pulmonary***   [x] HFO2 30L/40% [x] Nitric oxide 20ppm   Patient was weaned and extubated off of CPAP to HFNC today   Pain control / lung expansion maneuvers   F/u Spo2 / ABG / CXR     ***GI***  [x] NPO  [x] Protonix    Bowel regimen with Miralax.    ***Renal***  GFR 75 / [x] CKD  Continue to monitor I/Os, BUN/Creatinine.   Replete lytes PRN  Hernandez present  [x] positive     ***ID***  Strict isolation   Perioperative antibiotics with Cefepime and Vancomycin   Mupirocin for MRSA prophylaxis     ***Endocrine***   [x] DM2  HbA1c 6.2%               - [x] Insulin gtt              - Need tight glycemic control to prevent wound infection.    Patient requires continuous monitoring with bedside rhythm monitoring, pulse oximetry monitoring, and continuous central venous and arterial pressure monitoring; and intermittent blood gas analysis. Care plan discussed with the ICU care team.   Patient remained critical, at risk for life threatening decompensation.    I have spent 30 minutes providing critical care management to this patient.    By signing my name below, I, Rose Arias, attest that this documentation has been prepared under the direction and in the presence of Rommel Bautista MD   Electronically signed: Mini Goddard, 06-22-22 @ 19:26    I, Rommel Bautista, personally performed the services described in this documentation. all medical record entries made by the nelidaibe were at my direction and in my presence. I have reviewed the chart and agree that the record reflects my personal performance and is accurate and complete  Electronically signed: Rommel Bautista MD

## 2022-06-22 NOTE — PROGRESS NOTE ADULT - PROBLEM SELECTOR PLAN 2
- will be started on tacro 0.5 mg sublingual tonight (tacro goal 12-14)  - currently receiving solumedrol 125 mg IV q8, will be transition to taper starting tomorrow AM  - remains on cellcept 1000 mg IV BID

## 2022-06-22 NOTE — PHARMACOTHERAPY INTERVENTION NOTE - COMMENTS
67 y/o male received heart transplant on 6/21/2022.    Pre-op medications were reviewed and will be re-started appropriately. Plan discussed with multidisciplinary heart transplant and CT-ICU teams.     Continue current immunosuppressive regimen.    Induction:  Methylprednisolone (Solu-Medrol) 500 mG IVPB times 2 doses administered in the OR  Mycophenolate (Cellcept) 1,000 mG IVPB once administered in the OR    Maintenance:  Tacrolimus (Prograf) adjusted dose based on trough level - will be initiated within the next few days by the heart transplant cardiologist   Mycophenolate mofetil (Cellcept) 1,000 mg IVPB q12h (will be changed to PO when it is feasible)  Methylprednisolone (Solu-Medrol) IV Push then prednisone taper according to the heart transplant protocol     Anti-infective prophylaxis:    Toxoplasmosis (D-/R-, low risk)/PJP  Sulfamethoxazole/trimethoprim 400/80 mg (Bactrim SS) 1 tablet PO daily or 3 times a week depending on the SCr/K or atovaquone (Mepron) 1,500 mG PO daily - will be initiated shortly by the heart transplant cardiologist     CMV (D+/R+, Intermediate Risk)  Valganciclovir (Valcyte) 450 mg 1 tablet PO every 12 hours (will be adjusted according to the renal function) - will be initiated by the heart transplant cardiologist. Will monitor CMV PCR according to the heart transplant protocol     Oral Thrush  Nystatin 500,000 units swish and retain the mouth for as long as possible (several minutes) before swallowing 4 times a day - will be initiated when the patient is extubated and is able to swallow liquids     GI/DVT Prophylaxis:  Pantoprazole (Protonix) 40 mg IV Push daily - will transition to PO when it is feasible   DVT prophylaxis is on hold as per Wing Earl/Maria C - POD0    Surgical Prophylaxis:  ·	Mupirocin nasal apply twice a day for 5 days while waiting for MRSA nasal swap results   ·	Cefepime 1,000 mG IVPB q8h   ·	Vancomycin   - Obtain stat level post-op prior to ordering any additional vancomycin doses   - If level is bellow 15, vancomycin 750 mG IVPB q12h can be ordered with close vancomycin levels monitoring (troughs prior to each dose   for the first 48 hrs)   - Vancomycin 125 mG PO/NGT twice a day while on systemic antibiotics then 48 hours after antibiotics are discontinued    Shanita Richardson Pharm.D.  [phone]

## 2022-06-22 NOTE — PROGRESS NOTE ADULT - PROBLEM SELECTOR PLAN 3
- CMV +/+, will be started on Valcyte when feasible   - Toxo -/-, no ppx needed, will start bactrim for PJP ppx when appropriate  - remains on IV antibiotics post op for ppx

## 2022-06-22 NOTE — OCCUPATIONAL THERAPY INITIAL EVALUATION ADULT - ANTICIPATED DISCHARGE DISPOSITION, OT EVAL
TBD pending further functional evaluation Progress to home w home OT and assist from wife as needed/home w/ OT

## 2022-06-22 NOTE — PROCEDURE NOTE - PROCEDURE DATE TIME, MLM
21-Jun-2022
27-May-2022 17:37
07-Jun-2022 16:24
15-Gus-2022 19:16
22-Jun-2022 10:45
07-Jun-2022 13:51
22-Jun-2022 08:00
16-Jun-2022 13:47

## 2022-06-22 NOTE — PROGRESS NOTE ADULT - SUBJECTIVE AND OBJECTIVE BOX
Subjective: Patient seen and examined resting in bed. S/p OHT     Medications:  cefepime   IVPB 1000 milliGRAM(s) IV Intermittent every 8 hours  chlorhexidine 0.12% Liquid 15 milliLiter(s) Oral Mucosa every 12 hours  dexMEDEtomidine Infusion 0.3 MICROgram(s)/kG/Hr IV Continuous <Continuous>  dextrose 50% Injectable 50 milliLiter(s) IV Push every 15 minutes  dextrose 50% Injectable 25 milliLiter(s) IV Push every 15 minutes  DOBUTamine Infusion 2.5 MICROgram(s)/kG/Min IV Continuous <Continuous>  insulin regular Infusion 3 Unit(s)/Hr IV Continuous <Continuous>  methylPREDNISolone sodium succinate Injectable 125 milliGRAM(s) IV Push every 8 hours  milrinone Infusion 0.4 MICROgram(s)/kG/Min IV Continuous <Continuous>  mupirocin 2% Nasal 1 Application(s) Both Nostrils every 12 hours  mycophenolate mofetil IVPB 1000 milliGRAM(s) IV Intermittent <User Schedule>  norepinephrine Infusion 0.05 MICROgram(s)/kG/Min IV Continuous <Continuous>  pantoprazole  Injectable 40 milliGRAM(s) IV Push daily  polyethylene glycol 3350 17 Gram(s) Oral daily  sodium chloride 0.9%. 1000 milliLiter(s) IV Continuous <Continuous>  vancomycin    Solution 125 milliGRAM(s) Oral every 12 hours  vancomycin  IVPB 750 milliGRAM(s) IV Intermittent every 12 hours      ICU Vital Signs Last 24 Hrs  T(C): 36.4 (2022 04:00), Max: 37.5 (2022 00:00)  T(F): 97.5 (2022 04:00), Max: 99.5 (2022 00:00)  HR: 88 (2022 07:00) (80 - 118)  BP: 118/80 (2022 20:45) (118/80 - 118/80)  BP(mean): 94 (2022 20:45) (94 - 94)  ABP: 105/59 (2022 07:00) (14/14 - 128/69)  ABP(mean): 84 (2022 07:00) (14 - 97)  RR: 16 (2022 07:00) (14 - 45)  SpO2: 100% (2022 07:00) (93% - 100%)      Mode: AC/ CMV (Assist Control/ Continuous Mandatory Ventilation)  RR (machine): 14  TV (machine): 550  FiO2: 40  PEEP: 5  ITime: 1  MAP: 10  PIP: 22      Weight in k.1 ( @ 00:00)    I&O's Summary    2022 07:01  -  2022 07:00  --------------------------------------------------------  IN: 2185.2 mL / OUT: 2060 mL / NET: 125.2 mL      Physical Exam  General: intubated   Neck: JVP ~14 cm  Chest: +vent sounds, +CT x 3  CV: Normal S1 and S2. No murmurs, rub, or gallops. Radial pulses normal.  Abdomen: Soft, non-distended, non-tender  Neurology: moves extremities     Labs:                        9.6    14.67 )-----------( 161      ( 2022 01:10 )             28.6     06-    147<H>  |  104  |  20  ----------------------------<  217<H>  3.8   |  23  |  1.10    Ca    10.2      2022 01:10  Phos  2.1       Mg     2.3         TPro  5.8<L>  /  Alb  4.4  /  TBili  2.5<H>  /  DBili  x   /  AST  136<H>  /  ALT  42  /  AlkPhos  37<L>      PT/INR - ( 2022 01:10 )   PT: 16.0 sec;   INR: 1.37 ratio         PTT - ( 2022 01:10 )  PTT:46.3 sec  CARDIAC MARKERS ( 2022 20:45 )  x     / x     / 1548 U/L / x     / 222.5 ng/mL      Creatine Kinase, Serum: 1548 U/L (22 @ 20:45)  Creatine Kinase, Serum: 1548 U/L (22 @ 20:45)    Oxygen Saturation, Mixed: 67.3 ( @ 05:18)  Oxygen Saturation, Mixed: 71.3 ( @ 03:12)  Oxygen Saturation, Mixed: 78.1 ( @ 01:07)  Oxygen Saturation, Mixed: 77.0 ( @ 23:04)  Oxygen Saturation, Mixed: 87.9 ( @ 20:41)      Lactate, Blood: 1.3 mmol/L ( @ 01:15)  Lactate, Blood: 1.3 mmol/L ( @ 03:51)        Imaging Studies

## 2022-06-22 NOTE — OCCUPATIONAL THERAPY INITIAL EVALUATION ADULT - PERTINENT HX OF CURRENT PROBLEM, REHAB EVAL
66M with a history of ACC/AHA Stage D mixed NICM/ICM (likely familial with strong FH and early arrhythmia history in his 30's) with LVED 5.2 cm and LVEF 10-15% s/p PPM upgraded to CRT-D, CAD s/p PCI to mLAD 4/2022, well controlled DM2 (A1c 6.2%), and CKD III (Cr 1.4) who initially presented to [facility] 5/20 with near syncope in setting of worsening HF symptoms and found to have 41 episodes of VT, many terminating with ATP. (CONT BELOW)

## 2022-06-22 NOTE — AIRWAY REMOVAL NOTE  ADULT & PEDS - ARTIFICAL AIRWAY REMOVAL COMMENTS
Written order for extubation verified. The patient was identified by full name and birth date compared to the identification band.  Present during the procedure was  the PA

## 2022-06-22 NOTE — PROCEDURE NOTE - NSICDXPROCEDURE_GEN_ALL_CORE_FT
PROCEDURES:  Insertion of arterial line into left radial artery 22-Jun-2022 09:20:18  Nael Whitlock

## 2022-06-22 NOTE — OCCUPATIONAL THERAPY INITIAL EVALUATION ADULT - PRECAUTIONS/LIMITATIONS, REHAB EVAL
(CONT) LHC did not reveal new obstructive CAD and he underwent EPS which did not reveal endocardial substrate amenable for ablation. 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 He improved significantly with sequential uptitration of inotropes and increased pacing rate, but on 6/6 he had worsening JAGRUTI. He is s/p RHC which revealed severely elevated filling pressures, severe cpcPH, and CI of 1.9 on milrinone 0.5. IABP was placed 6/6 and his renal function/PAPs have improved. He is MCS dependent and was inadequately supported with high dose inotropes, so was listed UNOS status 2e for OHT, awaiting suitable donor. However, was made status 7 as he became febrile, last 6/7 T 38. He has been afebrile since and blood cultures from 6/7 with NGTD x 48 hours and his leukocytosis has not worsened. Discussed with transplant ID and since bld cx negative x48hrs he has been re-listed UNOS status 2e. s/p orthotopic heart transplant, ICD removal on 6/21. On POD 1 IABP was removed./cardiac precautions/sternal precautions

## 2022-06-22 NOTE — PROGRESS NOTE ADULT - ASSESSMENT
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 5/20 with near syncope in setting of worsening HF symptoms and found to have 41 episodes of VT, many terminating with ATP. LHC did not reveal new obstructive CAD and he underwent EPS which did not reveal endocardial substrate amenable for ablation. RHC revealed severely depressed cardiac output and he was transferred to [facility] 5/26 for advanced therapies evaluation.    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 He improved significantly with sequential uptitration of inotropes and increased pacing rate, but on 6/6 he had worsening JAGRUTI. He is s/p RHC which revealed severely elevated filling pressures, severe cpcPH, and CI of 1.9 on milrinone 0.5. IABP was placed and his renal function/PAPs have improved. He is MCS dependent and was inadequately supported with high dose inotropes, so was listed UNOS status 2e for OHT, awaiting suitable donor. However, was made status 7 as he became febrile, last 6/7 T 38. He has been afebrile since and blood cultures from 6/7 with NGTD x 48 hours and his leukocytosis has not worsened. Discussed with transplant ID and since bld cx negative x48hrs he has been re-listed UNOS status 2e.     A suitable donor was identified and patient underwent OHT with Dr. Earl/Maria C on 6/21 (ischemic time 261 mins, CMV +/+, Toxo -/-). On POD 1 IABP was removed. Will work towards extubation later today vs tomorrow, otherwise progressing well. Remains on /Primacor.           Primary cardiologist: Ari Wagner MD    Review of studies  TTE 5/21: LV 5.2 cm, LVEF 10-15%, LVOT VTI 10 cm, moderate RV dysfunction, mild AI, minimal MR  EKG: a-BiV paced  [facility] 5/23: patent mLAD stent with slow flow, D1 with 40-50% stenosis, mild disease otherwise  RHC 5/26: RA 12, PA 70/40 (50), PCWP 22, Milana CO/CI 2.3/1.3, MAP 83 with SVR 2469, PVR 12 PERSAUD    Hemodynamics  5/27 (milrinone 0.25): RA 10, PA 76/35 (49), PCWP 34, PA sat 57% with Milana CO/CI 3.1/1.6, MAP 71 with SVR 1574, PVR 4.8  5/28 (on milrinone 0.375): RA 7, PA 63/31, PCWP 26, PA sat 72.8% with Milana CO/CI 4.9/2.5 (CI later dropped to 1.6 in the afternoon), MAP 70 with SVR 1039, PVR 2.9  5/29 (on milrinone 0.5): RA 8, PA 75/32 (50), PCWP 28, PA sat 74% with Milana CO/CI 5.0/2.6, MAP 77 with SVR 1200, PVR 4.4  5/29 (on milrinone 0.5 and nipride to 3 mcg/kg/min): RA 6, PA 59/31 (40), PCWP 30, PA sat 79% with Milana CO/CI 7.0/3.6, BP 97/57 (MAP 70) with , PVR 1.4  6/6 RHC (mil 0.5): RA 11 (v13), RV 75/17, PA 80/36/52, PCWP 24 (v29), PA sat 59.5%, CO/CI (F) 3.58/1.88  6/7 (mil 0.5, IABP 1:1): CVP 5, PA 66/32/45, PA sat 65.7%, CO/CI (F) 4.0/2.1, SVR 2000  6/8 (mil 0.5, IABP 1:1): CVP 1, PA 46/19/28, PA sat 72.7%, CO/CI (F) 5.6/2.9, SVR 1257  6/8 PM (mil 0.5, IABP 1:1): CVP 4, PA 68/25/38, PA sat 68%, CO/CI (f) 5/2.6, SVR 1326

## 2022-06-22 NOTE — PROGRESS NOTE ADULT - PROBLEM SELECTOR PLAN 7
- hematology consulted for polycythemia workup  - EPO level elevated at 23.6 suggestive of secondary polycythemia. No MELINA 2 mutation identified, therefore per heme, concern for primary PCV is low.

## 2022-06-22 NOTE — PROCEDURE NOTE - NSINDICATIONS_GEN_A_CORE
venous access
arterial puncture to obtain ABG's/critical patient/monitoring purposes
hemodynamic monitoring

## 2022-06-22 NOTE — PROGRESS NOTE ADULT - CRITICAL CARE ATTENDING COMMENT
s/p heart transplant last night.  66yrs, mixed cardiomyopathy, supported UNOS Status IIe. IABP since 6.7.   Ischemic 4hr 20min. No periop issues.  Donor CMV +, Toxo -  Recip CMV +, Toxo -  stable this am. intubate, on IABP hemodynamically stable  meds: prograf .5 s/l bid, cellcept 1g bid, medrol 125 Q8 then taper, PPI, milrinone .4,  2.5, jerry, cefipime 1 Q8, vanco 750 BID, vanco PO, NO 20PPM    CVP 15, PA 37/16 26, PA sat 75  IABP just removed.   HR , 95//55, afebrile.  CXR: mild PVC.   1L UO  06-22  147<H>  |  104  |  20  ----------------------------<  217<H>  3.8   |  23  |  1.10  Ca    10.2      22 Jun 2022 01:10  Phos  2.1     06-22  Mg     2.3     06-22  TPro  5.8<L>  /  Alb  4.4  /  TBili  2.5<H>  /  DBili  x   /  AST  136<H>  /  ALT  42  /  AlkPhos  37<L>  06-22                        9.6    14.67 )-----------( 161      ( 22 Jun 2022 01:10 )             28.6     stable post OHT.   IABP now removed.   plan to wean NO   PRN diuretics for -ve balance 500 and CVP <12  Dany Dominique

## 2022-06-22 NOTE — PROCEDURE NOTE - NSPROCNAME_GEN_A_CORE
Central Line Insertion
General
General
CRT-D (Cardiac Resynchronization Therapy with Defibrillation Capabilities) Interrogation Note
Midline Insertion
ICD Interrogation Note
CRT-D (Cardiac Resynchronization Therapy with Defibrillation Capabilities) Interrogation Note
Arterial Puncture/Cannulation

## 2022-06-22 NOTE — PROCEDURE NOTE - NSICDXPROCEDURE_GEN_ALL_CORE_FT
PROCEDURES:  Insertion of arterial line into left radial artery 22-Jun-2022 09:20:18  Nael Whitlock  Removal, intra-aortic balloon pump, percutaneous 22-Jun-2022 13:25:15  Nael Whitlock   PROCEDURES:  Removal, intra-aortic balloon pump, percutaneous 22-Jun-2022 13:25:15  Nael Whitlock

## 2022-06-22 NOTE — PROCEDURE NOTE - ADDITIONAL PROCEDURE DETAILS
PROCEDURE NOTE  REMOVAL OF INTRA AORTIC BALLOON PUMP (performed @ 1045)    The IABP (intra-aortic balloon pump) was weaned according to protocol.  Hemodynamics remained stable. Pulses in the right lower extremity are palpable/audible by doppler/absent. The patient was placed in the supine position.  The insertion site was identified and the sutures were removed. The IABP was turned off and the balloon deflated. The IABP was then removed.  Direct pressure was applied for 30 minutes. A sandbag was applied and is to remain in place for 4 hours.    Monitoring of the right groin and both lower extremities including neuro-vascular checks and vital signs every 15 minutes  x4, then every 30 minutes x 2, then every 1 hr x 4 was ordered.    Complications: None

## 2022-06-22 NOTE — PROGRESS NOTE ADULT - PROBLEM SELECTOR PLAN 1
- s/p OHT with Dr. Earl/Maria C on 6/21 (ischemic time 261 min)  - IABP removed on POD 1  - remains on  @ 2.5 mcg/kg/min, Primacor @ 0.4 mcg/kg/min, will wean as tolerated  - diuresis as needed for CVP goal 10-12  - plan for RHC/EMBx next week

## 2022-06-22 NOTE — PROCEDURE NOTE - NSINFORMCONSENT_GEN_A_CORE
This was an emergent procedure.
This was an emergent procedure.
Benefits, risks, and possible complications of procedure explained to patient/caregiver who verbalized understanding and gave written consent.
Benefits, risks, and possible complications of procedure explained to patient/caregiver who verbalized understanding and gave verbal consent.

## 2022-06-22 NOTE — PROGRESS NOTE ADULT - PROBLEM SELECTOR PLAN 4
- currently normal, continue to monitor  - upon arrival was noted to have JAGRUTI, likely related to low cardiac output  - diuresis as needed as stated above

## 2022-06-22 NOTE — PROGRESS NOTE ADULT - PROBLEM SELECTOR PLAN 5
- Tmax 100.4 on 6/7, cultures NGTD  - appreciate transplant ID recs, fever possibly due to RUE occlusive thrombus seen in right cephalic and basilic veins as seen on [facility] duplex performed on 6/3, possible thrombophlebitis, was given Ancef 6/2-6/7   - RUE doppler 6/3 with no evidence of DVT, but did have an occlusive thrombus within the superficial veins (cephalic and basilic)  - BCx NGTD (6/7), RVP negative, UA negative  - remains on abx ppx for postop course

## 2022-06-22 NOTE — PROGRESS NOTE ADULT - ASSESSMENT
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 LHC did not reveal new obstructive CAD and he underwent EPS which did not reveal endocardial substrate amenable for ablation. RHC revealed severely depressed cardiac output and he was transferred to [facility] 5/26 for advanced therapies evaluation.    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 He improved significantly with sequential uptitration of inotropes and increased pacing rate, but on 6/6 he had worsening JAGRUTI. He is s/p RHC which revealed severely elevated filling pressures, severe cpcPH, and CI of 1.9 on milrinone 0.5. IABP was placed and his renal function/PAPs have improved. He is MCS dependent and was inadequately supported with high dose inotropes, so was listed UNOS status 2e for OHT, awaiting suitable donor. However, was made status 7 as he became febrile, last 6/7 T 38. He has been afebrile since and blood cultures from 6/7 with NGTD x 48 hours and his leukocytosis has not worsened. Discussed with transplant ID and since bld cx negative x48hrs he has been re-listed UNOS status 2e.     A suitable donor was identified and patient underwent OHT with Dr. Earl/Maria C on 6/21 (ischemic time 261 mins, CMV +/+, Toxo -/-). On POD 1 IABP was removed. Extubated afternoon 6/22             67 YO M with a history of ACC/AHA Stage D mixed NICM/ICM (likely familial with strong FH and early arrhythmia history in his 30's) with LVED 5.2 cm and LVEF 10-15% s/p PPM upgraded to CRT-D, CAD s/p PCI to mLAD 4/2022, well controlled DM2 (A1c 6.2%), and CKD III (Cr 1.4) who initially presented to [facility] 5/20 with near syncope in setting of worsening HF symptoms and found to have 41 episodes of VT, many terminating with ATP. LHC did not reveal new obstructive CAD and he underwent EPS which did not reveal endocardial substrate amenable for ablation. RHC revealed severely depressed cardiac output and he was transferred to [facility] 5/26 for advanced therapies evaluation.    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 He improved significantly with sequential uptitration of inotropes and increased pacing rate, but on 6/6 he had worsening JAGRUTI. He is s/p RHC which revealed severely elevated filling pressures, severe cpcPH, and CI of 1.9 on milrinone 0.5. IABP was placed and his renal function/PAPs have improved. He is MCS dependent and was inadequately supported with high dose inotropes, so was listed UNOS status 2e for OHT, awaiting suitable donor. However, was made status 7 as he became febrile, last 6/7 T 38. He has been afebrile since and blood cultures from 6/7 with NGTD x 48 hours and his leukocytosis has not worsened. Discussed with transplant ID and since bld cx negative x48hrs he has been re-listed UNOS status 2e.     A suitable donor was identified and patient underwent OHT with Dr. Earl/Maria C on 6/21 (ischemic time 261 mins, CMV +/+, Toxo -/-). On POD 1 IABP was removed. Extubated afternoon 6/22    Cori-op antibiotics with  Vancomycin- check level prior to next dose  Cefepime 1 gram q 8hr  oral Vanco    CMV:  intermediate risk  will eventually need valcyte once electrolytes and renal function stabilizes    toxo  low risk    PJP  will eventually need Bactrim or Mepron    thrush:  nystatin    Reggie Escalante MD  Can be called via Teams  After 5pm/weekends [phone]

## 2022-06-23 LAB
ALBUMIN SERPL ELPH-MCNC: 3.3 G/DL — SIGNIFICANT CHANGE UP (ref 3.3–5)
ALP SERPL-CCNC: 46 U/L — SIGNIFICANT CHANGE UP (ref 40–120)
ALT FLD-CCNC: 38 U/L — SIGNIFICANT CHANGE UP (ref 10–45)
ANION GAP SERPL CALC-SCNC: 9 MMOL/L — SIGNIFICANT CHANGE UP (ref 5–17)
APTT BLD: 28.4 SEC — SIGNIFICANT CHANGE UP (ref 27.5–35.5)
AST SERPL-CCNC: 73 U/L — HIGH (ref 10–40)
BASE EXCESS BLDMV CALC-SCNC: -0.2 MMOL/L — SIGNIFICANT CHANGE UP (ref -3–3)
BASE EXCESS BLDMV CALC-SCNC: -0.7 MMOL/L — SIGNIFICANT CHANGE UP (ref -3–3)
BASE EXCESS BLDMV CALC-SCNC: -0.8 MMOL/L — SIGNIFICANT CHANGE UP (ref -3–3)
BASE EXCESS BLDMV CALC-SCNC: -2.6 MMOL/L — SIGNIFICANT CHANGE UP (ref -3–3)
BASE EXCESS BLDMV CALC-SCNC: 1.3 MMOL/L — SIGNIFICANT CHANGE UP (ref -3–3)
BASE EXCESS BLDMV CALC-SCNC: 1.7 MMOL/L — SIGNIFICANT CHANGE UP (ref -3–3)
BASE EXCESS BLDMV CALC-SCNC: 2.2 MMOL/L — SIGNIFICANT CHANGE UP (ref -3–3)
BASOPHILS # BLD AUTO: 0.01 K/UL — SIGNIFICANT CHANGE UP (ref 0–0.2)
BASOPHILS NFR BLD AUTO: 0.1 % — SIGNIFICANT CHANGE UP (ref 0–2)
BILIRUB SERPL-MCNC: 1.4 MG/DL — HIGH (ref 0.2–1.2)
BUN SERPL-MCNC: 30 MG/DL — HIGH (ref 7–23)
CALCIUM SERPL-MCNC: 9.2 MG/DL — SIGNIFICANT CHANGE UP (ref 8.4–10.5)
CHLORIDE SERPL-SCNC: 105 MMOL/L — SIGNIFICANT CHANGE UP (ref 96–108)
CO2 BLDMV-SCNC: 25 MMOL/L — SIGNIFICANT CHANGE UP (ref 21–29)
CO2 BLDMV-SCNC: 25 MMOL/L — SIGNIFICANT CHANGE UP (ref 21–29)
CO2 BLDMV-SCNC: 26 MMOL/L — SIGNIFICANT CHANGE UP (ref 21–29)
CO2 BLDMV-SCNC: 27 MMOL/L — SIGNIFICANT CHANGE UP (ref 21–29)
CO2 BLDMV-SCNC: 29 MMOL/L — SIGNIFICANT CHANGE UP (ref 21–29)
CO2 SERPL-SCNC: 25 MMOL/L — SIGNIFICANT CHANGE UP (ref 22–31)
CREAT SERPL-MCNC: 1.27 MG/DL — SIGNIFICANT CHANGE UP (ref 0.5–1.3)
EGFR: 63 ML/MIN/1.73M2 — SIGNIFICANT CHANGE UP
EOSINOPHIL # BLD AUTO: 0 K/UL — SIGNIFICANT CHANGE UP (ref 0–0.5)
EOSINOPHIL NFR BLD AUTO: 0 % — SIGNIFICANT CHANGE UP (ref 0–6)
GAS PNL BLDA: SIGNIFICANT CHANGE UP
GAS PNL BLDMV: SIGNIFICANT CHANGE UP
GLUCOSE BLDC GLUCOMTR-MCNC: 109 MG/DL — HIGH (ref 70–99)
GLUCOSE BLDC GLUCOMTR-MCNC: 113 MG/DL — HIGH (ref 70–99)
GLUCOSE BLDC GLUCOMTR-MCNC: 115 MG/DL — HIGH (ref 70–99)
GLUCOSE BLDC GLUCOMTR-MCNC: 119 MG/DL — HIGH (ref 70–99)
GLUCOSE BLDC GLUCOMTR-MCNC: 119 MG/DL — HIGH (ref 70–99)
GLUCOSE BLDC GLUCOMTR-MCNC: 120 MG/DL — HIGH (ref 70–99)
GLUCOSE BLDC GLUCOMTR-MCNC: 121 MG/DL — HIGH (ref 70–99)
GLUCOSE BLDC GLUCOMTR-MCNC: 122 MG/DL — HIGH (ref 70–99)
GLUCOSE BLDC GLUCOMTR-MCNC: 126 MG/DL — HIGH (ref 70–99)
GLUCOSE BLDC GLUCOMTR-MCNC: 128 MG/DL — HIGH (ref 70–99)
GLUCOSE BLDC GLUCOMTR-MCNC: 138 MG/DL — HIGH (ref 70–99)
GLUCOSE BLDC GLUCOMTR-MCNC: 139 MG/DL — HIGH (ref 70–99)
GLUCOSE BLDC GLUCOMTR-MCNC: 141 MG/DL — HIGH (ref 70–99)
GLUCOSE BLDC GLUCOMTR-MCNC: 143 MG/DL — HIGH (ref 70–99)
GLUCOSE BLDC GLUCOMTR-MCNC: 147 MG/DL — HIGH (ref 70–99)
GLUCOSE BLDC GLUCOMTR-MCNC: 149 MG/DL — HIGH (ref 70–99)
GLUCOSE BLDC GLUCOMTR-MCNC: 152 MG/DL — HIGH (ref 70–99)
GLUCOSE BLDC GLUCOMTR-MCNC: 181 MG/DL — HIGH (ref 70–99)
GLUCOSE BLDC GLUCOMTR-MCNC: 188 MG/DL — HIGH (ref 70–99)
GLUCOSE BLDC GLUCOMTR-MCNC: 189 MG/DL — HIGH (ref 70–99)
GLUCOSE BLDC GLUCOMTR-MCNC: 189 MG/DL — HIGH (ref 70–99)
GLUCOSE BLDC GLUCOMTR-MCNC: 213 MG/DL — HIGH (ref 70–99)
GLUCOSE SERPL-MCNC: 134 MG/DL — HIGH (ref 70–99)
HCO3 BLDMV-SCNC: 24 MMOL/L — SIGNIFICANT CHANGE UP (ref 20–28)
HCO3 BLDMV-SCNC: 25 MMOL/L — SIGNIFICANT CHANGE UP (ref 20–28)
HCO3 BLDMV-SCNC: 27 MMOL/L — SIGNIFICANT CHANGE UP (ref 20–28)
HCO3 BLDMV-SCNC: 27 MMOL/L — SIGNIFICANT CHANGE UP (ref 20–28)
HCO3 BLDMV-SCNC: 28 MMOL/L — SIGNIFICANT CHANGE UP (ref 20–28)
HCT VFR BLD CALC: 24.4 % — LOW (ref 39–50)
HGB BLD-MCNC: 8 G/DL — LOW (ref 13–17)
HOROWITZ INDEX BLDMV+IHG-RTO: 40 — SIGNIFICANT CHANGE UP
IMM GRANULOCYTES NFR BLD AUTO: 0.6 % — SIGNIFICANT CHANGE UP (ref 0–1.5)
INR BLD: 1.25 RATIO — HIGH (ref 0.88–1.16)
LYMPHOCYTES # BLD AUTO: 1.3 K/UL — SIGNIFICANT CHANGE UP (ref 1–3.3)
LYMPHOCYTES # BLD AUTO: 7.3 % — LOW (ref 13–44)
MAGNESIUM SERPL-MCNC: 2.2 MG/DL — SIGNIFICANT CHANGE UP (ref 1.6–2.6)
MCHC RBC-ENTMCNC: 29.9 PG — SIGNIFICANT CHANGE UP (ref 27–34)
MCHC RBC-ENTMCNC: 32.8 GM/DL — SIGNIFICANT CHANGE UP (ref 32–36)
MCV RBC AUTO: 91 FL — SIGNIFICANT CHANGE UP (ref 80–100)
MONOCYTES # BLD AUTO: 0.98 K/UL — HIGH (ref 0–0.9)
MONOCYTES NFR BLD AUTO: 5.5 % — SIGNIFICANT CHANGE UP (ref 2–14)
NEUTROPHILS # BLD AUTO: 15.45 K/UL — HIGH (ref 1.8–7.4)
NEUTROPHILS NFR BLD AUTO: 86.5 % — HIGH (ref 43–77)
NRBC # BLD: 0 /100 WBCS — SIGNIFICANT CHANGE UP (ref 0–0)
O2 CT VFR BLD CALC: 40 MMHG — SIGNIFICANT CHANGE UP (ref 30–65)
O2 CT VFR BLD CALC: 42 MMHG — SIGNIFICANT CHANGE UP (ref 30–65)
O2 CT VFR BLD CALC: 45 MMHG — SIGNIFICANT CHANGE UP (ref 30–65)
O2 CT VFR BLD CALC: 45 MMHG — SIGNIFICANT CHANGE UP (ref 30–65)
O2 CT VFR BLD CALC: 46 MMHG — SIGNIFICANT CHANGE UP (ref 30–65)
O2 CT VFR BLD CALC: 47 MMHG — SIGNIFICANT CHANGE UP (ref 30–65)
O2 CT VFR BLD CALC: 48 MMHG — SIGNIFICANT CHANGE UP (ref 30–65)
PCO2 BLDMV: 40 MMHG — SIGNIFICANT CHANGE UP (ref 30–65)
PCO2 BLDMV: 41 MMHG — SIGNIFICANT CHANGE UP (ref 30–65)
PCO2 BLDMV: 44 MMHG — SIGNIFICANT CHANGE UP (ref 30–65)
PCO2 BLDMV: 45 MMHG — SIGNIFICANT CHANGE UP (ref 30–65)
PCO2 BLDMV: 46 MMHG — SIGNIFICANT CHANGE UP (ref 30–65)
PCO2 BLDMV: 46 MMHG — SIGNIFICANT CHANGE UP (ref 30–65)
PCO2 BLDMV: 47 MMHG — SIGNIFICANT CHANGE UP (ref 30–65)
PH BLDMV: 7.32 — SIGNIFICANT CHANGE UP (ref 7.32–7.45)
PH BLDMV: 7.37 — SIGNIFICANT CHANGE UP (ref 7.32–7.45)
PH BLDMV: 7.37 — SIGNIFICANT CHANGE UP (ref 7.32–7.45)
PH BLDMV: 7.38 — SIGNIFICANT CHANGE UP (ref 7.32–7.45)
PH BLDMV: 7.39 — SIGNIFICANT CHANGE UP (ref 7.32–7.45)
PHOSPHATE SERPL-MCNC: 6.2 MG/DL — HIGH (ref 2.5–4.5)
PLATELET # BLD AUTO: 164 K/UL — SIGNIFICANT CHANGE UP (ref 150–400)
POTASSIUM SERPL-MCNC: 4.4 MMOL/L — SIGNIFICANT CHANGE UP (ref 3.5–5.3)
POTASSIUM SERPL-SCNC: 4.4 MMOL/L — SIGNIFICANT CHANGE UP (ref 3.5–5.3)
PROT SERPL-MCNC: 5.3 G/DL — LOW (ref 6–8.3)
PROTHROM AB SERPL-ACNC: 14.4 SEC — HIGH (ref 10.5–13.4)
RBC # BLD: 2.68 M/UL — LOW (ref 4.2–5.8)
RBC # FLD: 17.2 % — HIGH (ref 10.3–14.5)
SAO2 % BLDMV: 61.4 — SIGNIFICANT CHANGE UP (ref 60–90)
SAO2 % BLDMV: 68.4 — SIGNIFICANT CHANGE UP (ref 60–90)
SAO2 % BLDMV: 73 — SIGNIFICANT CHANGE UP (ref 60–90)
SAO2 % BLDMV: 75.1 — SIGNIFICANT CHANGE UP (ref 60–90)
SAO2 % BLDMV: 75.4 — SIGNIFICANT CHANGE UP (ref 60–90)
SAO2 % BLDMV: 76.5 — SIGNIFICANT CHANGE UP (ref 60–90)
SAO2 % BLDMV: 76.5 — SIGNIFICANT CHANGE UP (ref 60–90)
SODIUM SERPL-SCNC: 139 MMOL/L — SIGNIFICANT CHANGE UP (ref 135–145)
TACROLIMUS SERPL-MCNC: 1.5 NG/ML — SIGNIFICANT CHANGE UP
TSH SERPL-MCNC: 0.57 UIU/ML — SIGNIFICANT CHANGE UP (ref 0.27–4.2)
VANCOMYCIN TROUGH SERPL-MCNC: 13.4 UG/ML — SIGNIFICANT CHANGE UP (ref 10–20)
VANCOMYCIN TROUGH SERPL-MCNC: 15.2 UG/ML — SIGNIFICANT CHANGE UP (ref 10–20)
VANCOMYCIN TROUGH SERPL-MCNC: 18.3 UG/ML — SIGNIFICANT CHANGE UP (ref 10–20)
WBC # BLD: 17.84 K/UL — HIGH (ref 3.8–10.5)
WBC # FLD AUTO: 17.84 K/UL — HIGH (ref 3.8–10.5)

## 2022-06-23 PROCEDURE — 99291 CRITICAL CARE FIRST HOUR: CPT

## 2022-06-23 PROCEDURE — 99292 CRITICAL CARE ADDL 30 MIN: CPT

## 2022-06-23 PROCEDURE — 90791 PSYCH DIAGNOSTIC EVALUATION: CPT

## 2022-06-23 PROCEDURE — 93010 ELECTROCARDIOGRAM REPORT: CPT

## 2022-06-23 PROCEDURE — 90832 PSYTX W PT 30 MINUTES: CPT

## 2022-06-23 PROCEDURE — 99232 SBSQ HOSP IP/OBS MODERATE 35: CPT

## 2022-06-23 PROCEDURE — 71045 X-RAY EXAM CHEST 1 VIEW: CPT | Mod: 26

## 2022-06-23 RX ORDER — FUROSEMIDE 40 MG
20 TABLET ORAL ONCE
Refills: 0 | Status: COMPLETED | OUTPATIENT
Start: 2022-06-23 | End: 2022-06-23

## 2022-06-23 RX ORDER — TACROLIMUS 5 MG/1
2 CAPSULE ORAL
Refills: 0 | Status: COMPLETED | OUTPATIENT
Start: 2022-06-23 | End: 2022-06-25

## 2022-06-23 RX ORDER — ALBUMIN HUMAN 25 %
100 VIAL (ML) INTRAVENOUS ONCE
Refills: 0 | Status: COMPLETED | OUTPATIENT
Start: 2022-06-23 | End: 2022-06-23

## 2022-06-23 RX ORDER — POTASSIUM CHLORIDE 20 MEQ
10 PACKET (EA) ORAL ONCE
Refills: 0 | Status: COMPLETED | OUTPATIENT
Start: 2022-06-23 | End: 2022-06-23

## 2022-06-23 RX ORDER — BUMETANIDE 0.25 MG/ML
2 INJECTION INTRAMUSCULAR; INTRAVENOUS ONCE
Refills: 0 | Status: COMPLETED | OUTPATIENT
Start: 2022-06-23 | End: 2022-06-23

## 2022-06-23 RX ORDER — FUROSEMIDE 40 MG
40 TABLET ORAL ONCE
Refills: 0 | Status: COMPLETED | OUTPATIENT
Start: 2022-06-23 | End: 2022-06-23

## 2022-06-23 RX ORDER — DOBUTAMINE HCL 250MG/20ML
1 VIAL (ML) INTRAVENOUS
Qty: 500 | Refills: 0 | Status: DISCONTINUED | OUTPATIENT
Start: 2022-06-23 | End: 2022-06-25

## 2022-06-23 RX ORDER — HYDRALAZINE HCL 50 MG
10 TABLET ORAL ONCE
Refills: 0 | Status: COMPLETED | OUTPATIENT
Start: 2022-06-23 | End: 2022-06-23

## 2022-06-23 RX ORDER — FUROSEMIDE 40 MG
40 TABLET ORAL EVERY 12 HOURS
Refills: 0 | Status: DISCONTINUED | OUTPATIENT
Start: 2022-06-23 | End: 2022-06-25

## 2022-06-23 RX ADMIN — HEPARIN SODIUM 5000 UNIT(S): 5000 INJECTION INTRAVENOUS; SUBCUTANEOUS at 05:09

## 2022-06-23 RX ADMIN — GABAPENTIN 100 MILLIGRAM(S): 400 CAPSULE ORAL at 17:27

## 2022-06-23 RX ADMIN — CEFEPIME 100 MILLIGRAM(S): 1 INJECTION, POWDER, FOR SOLUTION INTRAMUSCULAR; INTRAVENOUS at 13:44

## 2022-06-23 RX ADMIN — MUPIROCIN 1 APPLICATION(S): 20 OINTMENT TOPICAL at 06:14

## 2022-06-23 RX ADMIN — MILRINONE LACTATE 8.75 MICROGRAM(S)/KG/MIN: 1 INJECTION, SOLUTION INTRAVENOUS at 20:35

## 2022-06-23 RX ADMIN — Medication 650 MILLIGRAM(S): at 06:45

## 2022-06-23 RX ADMIN — Medication 2.19 MICROGRAM(S)/KG/MIN: at 20:35

## 2022-06-23 RX ADMIN — CEFEPIME 100 MILLIGRAM(S): 1 INJECTION, POWDER, FOR SOLUTION INTRAMUSCULAR; INTRAVENOUS at 22:28

## 2022-06-23 RX ADMIN — Medication 50 MILLILITER(S): at 22:29

## 2022-06-23 RX ADMIN — Medication 50 MILLIEQUIVALENT(S): at 22:29

## 2022-06-23 RX ADMIN — Medication 125 MILLIGRAM(S): at 20:34

## 2022-06-23 RX ADMIN — Medication 10 MILLIGRAM(S): at 13:45

## 2022-06-23 RX ADMIN — Medication 250 MILLIGRAM(S): at 01:45

## 2022-06-23 RX ADMIN — Medication 40 MILLIGRAM(S): at 13:44

## 2022-06-23 RX ADMIN — Medication 40 MILLIGRAM(S): at 07:54

## 2022-06-23 RX ADMIN — BUMETANIDE 2 MILLIGRAM(S): 0.25 INJECTION INTRAMUSCULAR; INTRAVENOUS at 06:14

## 2022-06-23 RX ADMIN — Medication 125 MILLIGRAM(S): at 07:53

## 2022-06-23 RX ADMIN — TRAMADOL HYDROCHLORIDE 25 MILLIGRAM(S): 50 TABLET ORAL at 06:15

## 2022-06-23 RX ADMIN — TRAMADOL HYDROCHLORIDE 25 MILLIGRAM(S): 50 TABLET ORAL at 22:28

## 2022-06-23 RX ADMIN — Medication 650 MILLIGRAM(S): at 06:15

## 2022-06-23 RX ADMIN — MYCOPHENOLATE MOFETIL 83.34 MILLIGRAM(S): 250 CAPSULE ORAL at 07:53

## 2022-06-23 RX ADMIN — Medication 20 MILLIGRAM(S): at 02:29

## 2022-06-23 RX ADMIN — MYCOPHENOLATE MOFETIL 83.34 MILLIGRAM(S): 250 CAPSULE ORAL at 20:35

## 2022-06-23 RX ADMIN — INSULIN HUMAN 3 UNIT(S)/HR: 100 INJECTION, SOLUTION SUBCUTANEOUS at 20:36

## 2022-06-23 RX ADMIN — CEFEPIME 100 MILLIGRAM(S): 1 INJECTION, POWDER, FOR SOLUTION INTRAMUSCULAR; INTRAVENOUS at 05:09

## 2022-06-23 RX ADMIN — TRAMADOL HYDROCHLORIDE 25 MILLIGRAM(S): 50 TABLET ORAL at 06:45

## 2022-06-23 RX ADMIN — CHLORHEXIDINE GLUCONATE 1 APPLICATION(S): 213 SOLUTION TOPICAL at 05:10

## 2022-06-23 RX ADMIN — HEPARIN SODIUM 5000 UNIT(S): 5000 INJECTION INTRAVENOUS; SUBCUTANEOUS at 22:28

## 2022-06-23 RX ADMIN — GABAPENTIN 100 MILLIGRAM(S): 400 CAPSULE ORAL at 05:09

## 2022-06-23 RX ADMIN — HEPARIN SODIUM 5000 UNIT(S): 5000 INJECTION INTRAVENOUS; SUBCUTANEOUS at 13:44

## 2022-06-23 RX ADMIN — Medication 650 MILLIGRAM(S): at 18:46

## 2022-06-23 RX ADMIN — TACROLIMUS 2 MILLIGRAM(S): 5 CAPSULE ORAL at 20:35

## 2022-06-23 RX ADMIN — MUPIROCIN 1 APPLICATION(S): 20 OINTMENT TOPICAL at 17:27

## 2022-06-23 RX ADMIN — TRAMADOL HYDROCHLORIDE 25 MILLIGRAM(S): 50 TABLET ORAL at 13:45

## 2022-06-23 RX ADMIN — Medication 40 MILLIGRAM(S): at 20:34

## 2022-06-23 RX ADMIN — Medication 40 MILLIGRAM(S): at 20:35

## 2022-06-23 RX ADMIN — TRAMADOL HYDROCHLORIDE 25 MILLIGRAM(S): 50 TABLET ORAL at 22:43

## 2022-06-23 RX ADMIN — TACROLIMUS 0.5 MILLIGRAM(S): 5 CAPSULE ORAL at 07:54

## 2022-06-23 RX ADMIN — TRAMADOL HYDROCHLORIDE 25 MILLIGRAM(S): 50 TABLET ORAL at 14:01

## 2022-06-23 RX ADMIN — Medication 650 MILLIGRAM(S): at 17:27

## 2022-06-23 NOTE — PROGRESS NOTE ADULT - ASSESSMENT
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 5/20 with near syncope in setting of worsening HF symptoms and found to have 41 episodes of VT, many terminating with ATP. LHC did not reveal new obstructive CAD and he underwent EPS which did not reveal endocardial substrate amenable for ablation. RHC revealed severely depressed cardiac output and he was transferred to [facility] 5/26 for advanced therapies evaluation.    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 He improved significantly with sequential uptitration of inotropes and increased pacing rate, but on 6/6 he had worsening JAGRUTI. He is s/p RHC which revealed severely elevated filling pressures, severe cpcPH, and CI of 1.9 on milrinone 0.5. IABP was placed and his renal function/PAPs have improved. He is MCS dependent and was inadequately supported with high dose inotropes, so was listed UNOS status 2e for OHT, awaiting suitable donor. However, was made status 7 as he became febrile, last 6/7 T 38. He has been afebrile since and blood cultures from 6/7 with NGTD x 48 hours and his leukocytosis has not worsened. Discussed with transplant ID and since bld cx negative x48hrs he has been re-listed UNOS status 2e.     A suitable donor was identified and patient underwent OHT with Dr. Earl/Maria C on 6/21 (ischemic time 261 mins, CMV +/+, Toxo -/-). Patient was successfully extubated and IABP was removed on POD 1. He appears to be volume overloaded with slight bump in renal function, will aim to make net negative 2L daily.  weaned off 6/23 and currently remains on Primacor. Plan to RHC/EMBx Thursday 6/30.           Primary cardiologist: Ari Wagner MD    Review of studies  TTE 5/21: LV 5.2 cm, LVEF 10-15%, LVOT VTI 10 cm, moderate RV dysfunction, mild AI, minimal MR  EKG: a-BiV paced  C 5/23: patent mLAD stent with slow flow, D1 with 40-50% stenosis, mild disease otherwise  RHC 5/26: RA 12, PA 70/40 (50), PCWP 22, Milana CO/CI 2.3/1.3, MAP 83 with SVR 2469, PVR 12 PERSAUD    Hemodynamics  5/27 (milrinone 0.25): RA 10, PA 76/35 (49), PCWP 34, PA sat 57% with Milana CO/CI 3.1/1.6, MAP 71 with SVR 1574, PVR 4.8  5/28 (on milrinone 0.375): RA 7, PA 63/31, PCWP 26, PA sat 72.8% with Milana CO/CI 4.9/2.5 (CI later dropped to 1.6 in the afternoon), MAP 70 with SVR 1039, PVR 2.9  5/29 (on milrinone 0.5): RA 8, PA 75/32 (50), PCWP 28, PA sat 74% with Milana CO/CI 5.0/2.6, MAP 77 with SVR 1200, PVR 4.4  5/29 (on milrinone 0.5 and nipride to 3 mcg/kg/min): RA 6, PA 59/31 (40), PCWP 30, PA sat 79% with Milana CO/CI 7.0/3.6, BP 97/57 (MAP 70) with , PVR 1.4  6/6 RHC (mil 0.5): RA 11 (v13), RV 75/17, PA 80/36/52, PCWP 24 (v29), PA sat 59.5%, CO/CI (F) 3.58/1.88  6/7 (mil 0.5, IABP 1:1): CVP 5, PA 66/32/45, PA sat 65.7%, CO/CI (F) 4.0/2.1, SVR 2000  6/8 (mil 0.5, IABP 1:1): CVP 1, PA 46/19/28, PA sat 72.7%, CO/CI (F) 5.6/2.9, SVR 1257  6/8 PM (mil 0.5, IABP 1:1): CVP 4, PA 68/25/38, PA sat 68%, CO/CI (f) 5/2.6, SVR 1326

## 2022-06-23 NOTE — PROGRESS NOTE ADULT - PROBLEM SELECTOR PLAN 1
- s/p OHT with Dr. Earl/Maria C on 6/21 (ischemic time 261 min)  - IABP removed on POD 1  - weaned off  6/23  - remains on  Primacor @ 0.4 mcg/kg/min, will wean as tolerated  - will be started on Lasix 40 mg IV BID, ok to increase dose as needed to make patient net negative 1L per shift   - plan for RHC/EMBxThursday 6/30

## 2022-06-23 NOTE — PROGRESS NOTE ADULT - SUBJECTIVE AND OBJECTIVE BOX
Patient seen and examined at the bedside.    Remained critically ill on continuous ICU monitoring.    OBJECTIVE:  Vital Signs Last 24 Hrs  T(C): 35.9 (23 Jun 2022 16:00), Max: 36.1 (23 Jun 2022 08:00)  T(F): 96.6 (23 Jun 2022 16:00), Max: 97 (23 Jun 2022 08:00)  HR: 93 (23 Jun 2022 19:00) (80 - 97)  BP: --  BP(mean): --  RR: 25 (23 Jun 2022 19:00) (12 - 29)  SpO2: 98% (23 Jun 2022 19:00) (95% - 100%)    REVIEW OF SYSTEMS:  [x ] N/A    Physical Exam:  General: in bed with multiple lines , gtt & tubes    Neurology: Alert awake F/u all commands   Eyes: bilateral pupils equal and reactive   ENT/Neck:  Neck supple JVD   Respiratory: fine Rales noted bilaterally   CV: RRR, S1S2, no murmurs        [x] Sternal dressing, [x] Mediastinal CT x3,         [x] Sinus rhythm,[x] Temporary pacing - DDD box off   Abdominal: Soft, NT, ND +BS,   Extremities: 1-2+ pedal edema noted, + peripheral pulses   Skin: No Rashes, Hematoma, Ecchymosis                         Assessment:  66 yr old Male H/O HTN / NICM / AICD / DM2 / chronic kidney disease   S/P Heart Tx D # 1  Post Tx cardiogenic dysfunction / Cardiogenic shock / requiring dual inotrope   Hyperglycemia / hypophosphatemia / hypokalemia   Immunosuppression     Plan:   ***Neuro***  [x] Nonfocal    Post operative neuro assessment     ***Cardiovascular***  Invasive hemodynamic monitoring, assess perfusion indices   SR / CVP 8/  PAP 30/ Hct 28%/ Lactate 2.0  [x] Primacor 0.4 mcgs [x] Dobutamine 1.0 mcgs   Volume: [] Albumin __ [] Crystalloid __ [] PRBC __ [] Plts __ [] Cryo __ [] Plasma __ [] FEIBA __  Reassessment of hemodynamics post resuscitation   Monitor chest tube outputs   [] Bleeding ___ / [] Nonbleeding ___   [x] VTE prophylaxis with Lovenox   Serial EKG and cardiac enzymes     ***Pulmonary***   [x] HFO2 30L/40% [x] Nitric oxide 10ppm   Pain control / lung expansion maneuvers   F/u Spo2 / ABG / CXR     ***GI***  [x] Diet: Clear liquid diet   [x] Protonix    Bowel regimen with Miralax.    ***Renal***  GFR 75 / [x] CKD  Continue to monitor I/Os, BUN/Creatinine.   Replete lytes PRN  Hernandez present  [x] positive   Continue to diurese with Lasix     ***ID***  Strict isolation   Perioperative antibiotics with Cefepime and Vancomycin   C.diff prophylaxis c/w Vancomycin solution   Mupirocin for MRSA prophylaxis     ***Endocrine***   [x] DM2  HbA1c 6.2%               - [x] Insulin gtt              - Need tight glycemic control to prevent wound infection.            Patient requires continuous monitoring with bedside rhythm monitoring, pulse oximetry monitoring, and continuous central venous and arterial pressure monitoring; and intermittent blood gas analysis. Care plan discussed with the ICU care team.   Patient remained critical, at risk for life threatening decompensation.    I have spent 30 minutes providing critical care management to this patient.    By signing my name below, I, Minerva Whalen, attest that this documentation has been prepared under the direction and in the presence of Rommel Bautista MD   Electronically signed: Mini Roberts, 06-23-22 @ 20:28    I, Rommel Bautista, personally performed the services described in this documentation. all medical record entries made by the scribe were at my direction and in my presence. I have reviewed the chart and agree that the record reflects my personal performance and is accurate and complete  Electronically signed: Rommel Bautista MD  Patient seen and examined at the bedside.    Remained critically ill on continuous ICU monitoring.    OBJECTIVE:  Vital Signs Last 24 Hrs  T(C): 35.9 (2022 16:00), Max: 36.1 (2022 08:00)  T(F): 96.6 (2022 16:00), Max: 97 (2022 08:00)  HR: 93 (2022 19:00) (80 - 97)  BP: --  BP(mean): --  RR: 25 (2022 19:00) (12 - 29)  SpO2: 98% (2022 19:00) (95% - 100%)    REVIEW OF SYSTEMS:  [x ] N/A    Physical Exam:  General: in bed with multiple lines , gtt & tubes    Neurology: Alert awake F/u all commands   Eyes: bilateral pupils equal and reactive   ENT/Neck:  Neck supple JVD   Respiratory: fine Rales noted bilaterally   CV: RRR, S1S2, no murmurs        [x] Sternal dressing, [x] Mediastinal CT x3,         [x] Sinus rhythm,[x] Temporary pacing - DDD box off   Abdominal: Soft, NT, ND +BS,   Extremities: 1-2+ pedal edema noted, + peripheral pulses   Skin: No Rashes, Hematoma, Ecchymosis                         Assessment:  66 yr old Male H/O HTN / NICM / AICD / DM2 / chronic kidney disease Stage 3  S/P Heart Tx D # 2  Post Tx cardiogenic dysfunction / Cardiogenic shock / requiring dual inotrope   Fluid overload / PHTN / increase lactic acid level   Hyperglycemia   Immunosuppression     Plan:   ***Neuro***  [x] Nonfocal    Post operative neuro assessment     ***Cardiovascular***  Invasive hemodynamic monitoring, assess perfusion indices    / CVP 4-6/  PAP 50/ 17 (29)/ CI 3 / MAP 65-70 / Hct 28%/ Lactate 2.0   [x] Primacor 0.4 mcg/kg/min  [x] Dobutamine 1.0 mcg/kg/min    [x] Nitric oxide 10 PPM  Plan for gradual weaning of Nitric oxide / Diuresis / will favour wean  vs Primacor based on HR & BP   Monitor chest tube outputs    [X] Nonbleeding    TX immunosuppression         ***Pulmonary***   [x] HFO2 30L/40% [x] Nitric oxide 10ppm   Pain control / lung expansion maneuvers   F/u Spo2 / ABG / CXR     ***GI***  [x] Diet: Clear liquid diet   [x] Protonix    Bowel regimen with Miralax.    ***Renal***  GFR 75 / [x] CKD   Continue to monitor I/Os, BUN/Creatinine.   Replete lytes PRN  Hernandez present  [x] positive   Continue to diurese with Lasix     ***ID***  Strict isolation   Perioperative antibiotics with Cefepime and Vancomycin   C.diff prophylaxis c/w Vancomycin solution   Mupirocin for MRSA prophylaxis     ***Endocrine***   [x] DM2  HbA1c 6.2%               - [x] Insulin gtt              - Need tight glycemic control to prevent wound infection.            Patient requires continuous monitoring with bedside rhythm monitoring, pulse oximetry monitoring, and continuous central venous and arterial pressure monitoring; and intermittent blood gas analysis. Care plan discussed with the ICU care team.   Patient remained critical, at risk for life threatening decompensation.    I have spent 30 minutes providing critical care management to this patient.    By signing my name below, I, Minerva Whalen, attest that this documentation has been prepared under the direction and in the presence of Rommel Bautista MD   Electronically signed: Mini Roberts, 22 @ 20:28    I, Rommel Bautista, personally performed the services described in this documentation. all medical record entries made by the scribe were at my direction and in my presence. I have reviewed the chart and agree that the record reflects my personal performance and is accurate and complete  Electronically signed: Rommel Bautista MD

## 2022-06-23 NOTE — PROGRESS NOTE ADULT - PROBLEM SELECTOR PLAN 2
- will be transitioned to oral tacro, will adjust tacro as needed for goal 12-14  - currently on solumedrol taper per protocol    - remains on cellcept 1000 mg IV BID

## 2022-06-23 NOTE — PROGRESS NOTE ADULT - SUBJECTIVE AND OBJECTIVE BOX
Subjective: Patient seen and examined resting in bed.     Medications:  acetaminophen     Tablet .. 650 milliGRAM(s) Oral every 6 hours  cefepime   IVPB 1000 milliGRAM(s) IV Intermittent every 8 hours  chlorhexidine 2% Cloths 1 Application(s) Topical <User Schedule>  dextrose 50% Injectable 50 milliLiter(s) IV Push every 15 minutes  dextrose 50% Injectable 25 milliLiter(s) IV Push every 15 minutes  DOBUTamine Infusion 2.5 MICROgram(s)/kG/Min IV Continuous <Continuous>  gabapentin 100 milliGRAM(s) Oral two times a day  heparin   Injectable 5000 Unit(s) SubCutaneous every 8 hours  insulin regular Infusion 3 Unit(s)/Hr IV Continuous <Continuous>  methylPREDNISolone sodium succinate Injectable 40 milliGRAM(s) IV Push every 12 hours  milrinone Infusion 0.4 MICROgram(s)/kG/Min IV Continuous <Continuous>  mupirocin 2% Nasal 1 Application(s) Both Nostrils every 12 hours  mycophenolate mofetil IVPB 1000 milliGRAM(s) IV Intermittent <User Schedule>  pantoprazole  Injectable 40 milliGRAM(s) IV Push daily  polyethylene glycol 3350 17 Gram(s) Oral daily  sodium chloride 0.9%. 1000 milliLiter(s) IV Continuous <Continuous>  tacrolimus 0.5 milliGRAM(s) SubLingual <User Schedule>  traMADol 25 milliGRAM(s) Oral every 8 hours  vancomycin    Solution 125 milliGRAM(s) Oral every 12 hours  vancomycin  IVPB 750 milliGRAM(s) IV Intermittent every 12 hours      T(C): 36 (22 @ 04:00), Max: 36.8 (22 @ 08:00)  T(F): 96.8 (22 @ 04:00), Max: 98.2 (22 @ 08:00)  HR: 96 (22 @ 07:00) (84 - 101)  BP: --  ABP: 123/56 (22 @ 07:00) (88/52 - 139/64)  ABP(mean): 77 (22 @ 07:00) (64 - 94)  RR: 17 (22 @ 07:00) (14 - 26)  SpO2: 99% (22 @ 07:00) (95% - 100%)    Mode: CPAP with PS  FiO2: 40  PEEP: 5  PS: 5  MAP: 10  PIP: 22      Weight in k.5 ( @ 00:00)    I&O's Summary    2022 07:01  -  2022 07:00  --------------------------------------------------------  IN: 1959.6 mL / OUT: 2380 mL / NET: -420.4 mL        Physical Exam  General: No distress.  Neck: JVP ~14 cm  Chest: decreased breath sounds b/l, +CT x 3  CV: Normal S1 and S2. No murmurs, rub, or gallops. Radial pulses normal.  Abdomen: Soft, non-distended, non-tender  Neurology: Alert and oriented times three.     Labs:                        8.0    17.84 )-----------( 164      ( 2022 00:52 )             24.4         139  |  105  |  30<H>  ----------------------------<  134<H>  4.4   |  25  |  1.27    Ca    9.2      2022 00:52  Phos  6.2       Mg     2.2         TPro  5.3<L>  /  Alb  3.3  /  TBili  1.4<H>  /  DBili  x   /  AST  73<H>  /  ALT  38  /  AlkPhos  46  23    PT/INR - ( 2022 00:52 )   PT: 14.4 sec;   INR: 1.25 ratio         PTT - ( 2022 00:52 )  PTT:28.4 sec  CARDIAC MARKERS ( 2022 20:45 )  x     / x     / 1548 U/L / x     / 222.5 ng/mL      Creatine Kinase, Serum: 1548 U/L (22 @ 20:45)  Creatine Kinase, Serum: 1548 U/L (22 @ 20:45)    Oxygen Saturation, Mixed: 75.1 ( @ 01:50)  Oxygen Saturation, Mixed: 76.5 (06-22 @ 23:55)  Oxygen Saturation, Mixed: 79.0 ( @ 21:00)  Oxygen Saturation, Mixed: 77.5 ( @ 16:46)  Oxygen Saturation, Mixed: 66.0 ( @ 12:02)  Oxygen Saturation, Mixed: 75.1 ( @ 08:55)  Oxygen Saturation, Mixed: 63.7 ( @ 08:15)  Oxygen Saturation, Mixed: 67.3 ( @ 05:18)  Oxygen Saturation, Mixed: 71.3 ( @ 03:12)  Oxygen Saturation, Mixed: 78.1 ( @ 01:07)  Oxygen Saturation, Mixed: 77.0 ( @ 23:04)  Oxygen Saturation, Mixed: 87.9 ( @ 20:41)      Lactate, Blood: 1.3 mmol/L ( @ 01:15)        Imaging Studies  Subjective: Patient seen and examined resting in bed. Yesterday IABP was removed and patient was successfully extubated     Medications:  acetaminophen     Tablet .. 650 milliGRAM(s) Oral every 6 hours  cefepime   IVPB 1000 milliGRAM(s) IV Intermittent every 8 hours  chlorhexidine 2% Cloths 1 Application(s) Topical <User Schedule>  dextrose 50% Injectable 50 milliLiter(s) IV Push every 15 minutes  dextrose 50% Injectable 25 milliLiter(s) IV Push every 15 minutes  DOBUTamine Infusion 2.5 MICROgram(s)/kG/Min IV Continuous <Continuous>  gabapentin 100 milliGRAM(s) Oral two times a day  heparin   Injectable 5000 Unit(s) SubCutaneous every 8 hours  insulin regular Infusion 3 Unit(s)/Hr IV Continuous <Continuous>  methylPREDNISolone sodium succinate Injectable 40 milliGRAM(s) IV Push every 12 hours  milrinone Infusion 0.4 MICROgram(s)/kG/Min IV Continuous <Continuous>  mupirocin 2% Nasal 1 Application(s) Both Nostrils every 12 hours  mycophenolate mofetil IVPB 1000 milliGRAM(s) IV Intermittent <User Schedule>  pantoprazole  Injectable 40 milliGRAM(s) IV Push daily  polyethylene glycol 3350 17 Gram(s) Oral daily  sodium chloride 0.9%. 1000 milliLiter(s) IV Continuous <Continuous>  tacrolimus 0.5 milliGRAM(s) SubLingual <User Schedule>  traMADol 25 milliGRAM(s) Oral every 8 hours  vancomycin    Solution 125 milliGRAM(s) Oral every 12 hours  vancomycin  IVPB 750 milliGRAM(s) IV Intermittent every 12 hours      T(C): 36 (22 @ 04:00), Max: 36.8 (22 @ 08:00)  T(F): 96.8 (22 @ 04:00), Max: 98.2 (22 @ 08:00)  HR: 96 (22 @ 07:00) (84 - 101)  BP: --  ABP: 123/56 (22 @ 07:00) (88/52 - 139/64)  ABP(mean): 77 (22 @ 07:00) (64 - 94)  RR: 17 (22 @ 07:00) (14 - 26)  SpO2: 99% (22 @ 07:00) (95% - 100%)    Mode: CPAP with PS  FiO2: 40  PEEP: 5  PS: 5  MAP: 10  PIP: 22      Weight in k.5 ( @ 00:00)    I&O's Summary    2022 07:01  -  2022 07:00  --------------------------------------------------------  IN: 1959.6 mL / OUT: 2380 mL / NET: -420.4 mL        Physical Exam  General: No distress.  Neck: JVP ~14 cm  Chest: decreased breath sounds b/l, +CT x 3  CV: Normal S1 and S2. No murmurs, rub, or gallops. Radial pulses normal.  Abdomen: Soft, non-distended, non-tender  Neurology: Alert and oriented times three.     Labs:                        8.0    17.84 )-----------( 164      ( 2022 00:52 )             24.4         139  |  105  |  30<H>  ----------------------------<  134<H>  4.4   |  25  |  1.27    Ca    9.2      2022 00:52  Phos  6.2       Mg     2.2         TPro  5.3<L>  /  Alb  3.3  /  TBili  1.4<H>  /  DBili  x   /  AST  73<H>  /  ALT  38  /  AlkPhos  46      PT/INR - ( 2022 00:52 )   PT: 14.4 sec;   INR: 1.25 ratio         PTT - ( 2022 00:52 )  PTT:28.4 sec  CARDIAC MARKERS ( 2022 20:45 )  x     / x     / 1548 U/L / x     / 222.5 ng/mL      Creatine Kinase, Serum: 1548 U/L (22 @ 20:45)  Creatine Kinase, Serum: 1548 U/L (22 @ 20:45)    Oxygen Saturation, Mixed: 75.1 ( @ 01:50)  Oxygen Saturation, Mixed: 76.5 ( @ 23:55)  Oxygen Saturation, Mixed: 79.0 ( @ 21:00)  Oxygen Saturation, Mixed: 77.5 ( @ 16:46)  Oxygen Saturation, Mixed: 66.0 ( @ 12:02)  Oxygen Saturation, Mixed: 75.1 ( @ 08:55)  Oxygen Saturation, Mixed: 63.7 ( @ 08:15)  Oxygen Saturation, Mixed: 67.3 ( @ 05:18)  Oxygen Saturation, Mixed: 71.3 ( @ 03:12)  Oxygen Saturation, Mixed: 78.1 ( @ 01:07)  Oxygen Saturation, Mixed: 77.0 ( @ 23:04)  Oxygen Saturation, Mixed: 87.9 ( @ 20:41)      Lactate, Blood: 1.3 mmol/L ( @ 01:15)

## 2022-06-23 NOTE — PROGRESS NOTE ADULT - SUBJECTIVE AND OBJECTIVE BOX
INFECTIOUS DISEASES FOLLOW UP-- Arabella Escalante  [phone]    This is a follow up note for this  66yMale with  Cardiomyopathy        ROS:  CONSTITUTIONAL:  No fever, good appetite  CARDIOVASCULAR:  No chest pain or palpitations  RESPIRATORY:  No dyspnea  GASTROINTESTINAL:  No nausea, vomiting, diarrhea, or abdominal pain  GENITOURINARY:  No dysuria  NEUROLOGIC:  No headache,     Allergies    No Known Allergies    Intolerances        ANTIBIOTICS/RELEVANT:  antimicrobials  cefepime   IVPB 1000 milliGRAM(s) IV Intermittent every 8 hours  vancomycin    Solution 125 milliGRAM(s) Oral every 12 hours  vancomycin  IVPB 750 milliGRAM(s) IV Intermittent every 12 hours    immunologic:  mycophenolate mofetil IVPB 1000 milliGRAM(s) IV Intermittent <User Schedule>  tacrolimus 2 milliGRAM(s) Oral <User Schedule>    OTHER:  acetaminophen     Tablet .. 650 milliGRAM(s) Oral every 6 hours  chlorhexidine 2% Cloths 1 Application(s) Topical <User Schedule>  dextrose 50% Injectable 50 milliLiter(s) IV Push every 15 minutes  dextrose 50% Injectable 25 milliLiter(s) IV Push every 15 minutes  DOBUTamine Infusion 1 MICROgram(s)/kG/Min IV Continuous <Continuous>  furosemide   Injectable 40 milliGRAM(s) IV Push every 12 hours  gabapentin 100 milliGRAM(s) Oral two times a day  heparin   Injectable 5000 Unit(s) SubCutaneous every 8 hours  insulin regular Infusion 3 Unit(s)/Hr IV Continuous <Continuous>  methylPREDNISolone sodium succinate Injectable 40 milliGRAM(s) IV Push every 12 hours  milrinone Infusion 0.5 MICROgram(s)/kG/Min IV Continuous <Continuous>  mupirocin 2% Nasal 1 Application(s) Both Nostrils every 12 hours  pantoprazole  Injectable 40 milliGRAM(s) IV Push daily  polyethylene glycol 3350 17 Gram(s) Oral daily  sodium chloride 0.9%. 1000 milliLiter(s) IV Continuous <Continuous>  traMADol 25 milliGRAM(s) Oral every 8 hours      Objective:  Vital Signs Last 24 Hrs  T(C): 36.1 (23 Jun 2022 12:00), Max: 36.1 (22 Jun 2022 16:00)  T(F): 97 (23 Jun 2022 12:00), Max: 97 (22 Jun 2022 16:00)  HR: 93 (23 Jun 2022 14:10) (80 - 101)  BP: --  BP(mean): --  RR: 24 (23 Jun 2022 14:10) (12 - 27)  SpO2: 99% (23 Jun 2022 14:10) (95% - 100%)    PHYSICAL EXAM:  Constitutional:no acute distress  Eyes:JOHNY, EOMI  Ear/Nose/Throat: no oral lesions, 	  Respiratory: clear BL  Cardiovascular: S1S2  Gastrointestinal:soft, (+) BS, no tenderness  Extremities:no e/e/c  No Lymphadenopathy  IV sites not inflammed.    LABS:                        8.0    17.84 )-----------( 164      ( 23 Jun 2022 00:52 )             24.4     06-23    139  |  105  |  30<H>  ----------------------------<  134<H>  4.4   |  25  |  1.27    Ca    9.2      23 Jun 2022 00:52  Phos  6.2     06-23  Mg     2.2     06-23    TPro  5.3<L>  /  Alb  3.3  /  TBili  1.4<H>  /  DBili  x   /  AST  73<H>  /  ALT  38  /  AlkPhos  46  06-23    PT/INR - ( 23 Jun 2022 00:52 )   PT: 14.4 sec;   INR: 1.25 ratio         PTT - ( 23 Jun 2022 00:52 )  PTT:28.4 sec      MICROBIOLOGY:            RECENT CULTURES:  06-21 @ 23:09  .Tissue Other  --  --  --    Testing in progress  --      RADIOLOGY & ADDITIONAL STUDIES:  < from: Xray Chest 1 View- PORTABLE-Routine (Xray Chest 1 View- PORTABLE-Routine in AM.) (06.22.22 @ 03:03) >  IMPRESSION:  Decreased congestive changes.    < end of copied text >   INFECTIOUS DISEASES FOLLOW UP-- Arabella Escalante  [phone]    This is a follow up note for this  66yMale with  Cardiomyopathy  s/p heart transplant        ROS:  CONSTITUTIONAL:  No fever, good appetite  CARDIOVASCULAR:  No chest pain or palpitations  RESPIRATORY:  No dyspnea  GASTROINTESTINAL:  No nausea, vomiting, diarrhea, or abdominal pain  GENITOURINARY:  No dysuria  NEUROLOGIC:  No headache,     Allergies    No Known Allergies    Intolerances        ANTIBIOTICS/RELEVANT:  antimicrobials  cefepime   IVPB 1000 milliGRAM(s) IV Intermittent every 8 hours  vancomycin    Solution 125 milliGRAM(s) Oral every 12 hours  vancomycin  IVPB 750 milliGRAM(s) IV Intermittent every 12 hours    immunologic:  mycophenolate mofetil IVPB 1000 milliGRAM(s) IV Intermittent <User Schedule>  tacrolimus 2 milliGRAM(s) Oral <User Schedule>    OTHER:  acetaminophen     Tablet .. 650 milliGRAM(s) Oral every 6 hours  chlorhexidine 2% Cloths 1 Application(s) Topical <User Schedule>  dextrose 50% Injectable 50 milliLiter(s) IV Push every 15 minutes  dextrose 50% Injectable 25 milliLiter(s) IV Push every 15 minutes  DOBUTamine Infusion 1 MICROgram(s)/kG/Min IV Continuous <Continuous>  furosemide   Injectable 40 milliGRAM(s) IV Push every 12 hours  gabapentin 100 milliGRAM(s) Oral two times a day  heparin   Injectable 5000 Unit(s) SubCutaneous every 8 hours  insulin regular Infusion 3 Unit(s)/Hr IV Continuous <Continuous>  methylPREDNISolone sodium succinate Injectable 40 milliGRAM(s) IV Push every 12 hours  milrinone Infusion 0.5 MICROgram(s)/kG/Min IV Continuous <Continuous>  mupirocin 2% Nasal 1 Application(s) Both Nostrils every 12 hours  pantoprazole  Injectable 40 milliGRAM(s) IV Push daily  polyethylene glycol 3350 17 Gram(s) Oral daily  sodium chloride 0.9%. 1000 milliLiter(s) IV Continuous <Continuous>  traMADol 25 milliGRAM(s) Oral every 8 hours      Objective:  Vital Signs Last 24 Hrs  T(C): 36.1 (23 Jun 2022 12:00), Max: 36.1 (22 Jun 2022 16:00)  T(F): 97 (23 Jun 2022 12:00), Max: 97 (22 Jun 2022 16:00)  HR: 93 (23 Jun 2022 14:10) (80 - 101)  BP: --  BP(mean): --  RR: 24 (23 Jun 2022 14:10) (12 - 27)  SpO2: 99% (23 Jun 2022 14:10) (95% - 100%)    PHYSICAL EXAM:  Constitutional:no acute distress  Eyes:JOHNY, EOMI  Ear/Nose/Throat: no oral lesions, 	  Respiratory: clear BL  chest tubes  Cardiovascular: S1S2  sternotomy dressing CDI  Gastrointestinal:soft, (+) BS, no tenderness  Extremities:no e/e/c  No Lymphadenopathy  IV sites not inflammed.    LABS:                        8.0    17.84 )-----------( 164      ( 23 Jun 2022 00:52 )             24.4     06-23    139  |  105  |  30<H>  ----------------------------<  134<H>  4.4   |  25  |  1.27    Ca    9.2      23 Jun 2022 00:52  Phos  6.2     06-23  Mg     2.2     06-23    TPro  5.3<L>  /  Alb  3.3  /  TBili  1.4<H>  /  DBili  x   /  AST  73<H>  /  ALT  38  /  AlkPhos  46  06-23    PT/INR - ( 23 Jun 2022 00:52 )   PT: 14.4 sec;   INR: 1.25 ratio         PTT - ( 23 Jun 2022 00:52 )  PTT:28.4 sec      MICROBIOLOGY:            RECENT CULTURES:  06-21 @ 23:09  .Tissue Other  --  --  --    Testing in progress  --      RADIOLOGY & ADDITIONAL STUDIES:  < from: Xray Chest 1 View- PORTABLE-Routine (Xray Chest 1 View- PORTABLE-Routine in AM.) (06.22.22 @ 03:03) >  IMPRESSION:  Decreased congestive changes.    < end of copied text >   INFECTIOUS DISEASES FOLLOW UP-- Arabella Escalante  [phone]    This is a follow up note for this  66yMale with  Cardiomyopathy  s/p heart transplant        ROS:  CONSTITUTIONAL:   fever,   CARDIOVASCULAR:  No chest pain or palpitations  RESPIRATORY:  No dyspnea  GASTROINTESTINAL:  No nausea, vomiting, diarrhea, or abdominal pain  GENITOURINARY:  No dysuria  NEUROLOGIC:  No headache,     Allergies    No Known Allergies    Intolerances        ANTIBIOTICS/RELEVANT:  antimicrobials  cefepime   IVPB 1000 milliGRAM(s) IV Intermittent every 8 hours  vancomycin    Solution 125 milliGRAM(s) Oral every 12 hours  vancomycin  IVPB 750 milliGRAM(s) IV Intermittent every 12 hours    immunologic:  mycophenolate mofetil IVPB 1000 milliGRAM(s) IV Intermittent <User Schedule>  tacrolimus 2 milliGRAM(s) Oral <User Schedule>    OTHER:  acetaminophen     Tablet .. 650 milliGRAM(s) Oral every 6 hours  chlorhexidine 2% Cloths 1 Application(s) Topical <User Schedule>  dextrose 50% Injectable 50 milliLiter(s) IV Push every 15 minutes  dextrose 50% Injectable 25 milliLiter(s) IV Push every 15 minutes  DOBUTamine Infusion 1 MICROgram(s)/kG/Min IV Continuous <Continuous>  furosemide   Injectable 40 milliGRAM(s) IV Push every 12 hours  gabapentin 100 milliGRAM(s) Oral two times a day  heparin   Injectable 5000 Unit(s) SubCutaneous every 8 hours  insulin regular Infusion 3 Unit(s)/Hr IV Continuous <Continuous>  methylPREDNISolone sodium succinate Injectable 40 milliGRAM(s) IV Push every 12 hours  milrinone Infusion 0.5 MICROgram(s)/kG/Min IV Continuous <Continuous>  mupirocin 2% Nasal 1 Application(s) Both Nostrils every 12 hours  pantoprazole  Injectable 40 milliGRAM(s) IV Push daily  polyethylene glycol 3350 17 Gram(s) Oral daily  sodium chloride 0.9%. 1000 milliLiter(s) IV Continuous <Continuous>  traMADol 25 milliGRAM(s) Oral every 8 hours      Objective:  Vital Signs Last 24 Hrs  T(C): 36.1 (23 Jun 2022 12:00), Max: 36.1 (22 Jun 2022 16:00)  T(F): 97 (23 Jun 2022 12:00), Max: 97 (22 Jun 2022 16:00)  HR: 93 (23 Jun 2022 14:10) (80 - 101)  BP: --  BP(mean): --  RR: 24 (23 Jun 2022 14:10) (12 - 27)  SpO2: 99% (23 Jun 2022 14:10) (95% - 100%)    PHYSICAL EXAM:  Constitutional:no acute distress, reports feeling well  Eyes:JOHNY, EOMI  Ear/Nose/Throat: no oral lesions, 	  Respiratory: audible bilat  chest tubes  Cardiovascular: S1S2  sternotomy  sternotomy dressing CDI  Gastrointestinal:soft, (+) BS, no tenderness  Extremities:no e/e/c  No Lymphadenopathy  IV sites not inflammed.    LABS:                        8.0    17.84 )-----------( 164      ( 23 Jun 2022 00:52 )             24.4     06-23    139  |  105  |  30<H>  ----------------------------<  134<H>  4.4   |  25  |  1.27    Ca    9.2      23 Jun 2022 00:52  Phos  6.2     06-23  Mg     2.2     06-23    TPro  5.3<L>  /  Alb  3.3  /  TBili  1.4<H>  /  DBili  x   /  AST  73<H>  /  ALT  38  /  AlkPhos  46  06-23    PT/INR - ( 23 Jun 2022 00:52 )   PT: 14.4 sec;   INR: 1.25 ratio         PTT - ( 23 Jun 2022 00:52 )  PTT:28.4 sec      MICROBIOLOGY:            RECENT CULTURES:  06-21 @ 23:09  .Tissue Other  --  --  --    Testing in progress  --      RADIOLOGY & ADDITIONAL STUDIES:  < from: Xray Chest 1 View- PORTABLE-Routine (Xray Chest 1 View- PORTABLE-Routine in AM.) (06.22.22 @ 03:03) >  IMPRESSION:  Decreased congestive changes.    < end of copied text >

## 2022-06-23 NOTE — PROGRESS NOTE ADULT - PROBLEM SELECTOR PLAN 4
- currently normal, continue to monitor  - upon arrival was noted to have JAGRUTI, likely related to low cardiac output  - diuresis as needed as stated above 2017

## 2022-06-23 NOTE — PROGRESS NOTE ADULT - ASSESSMENT
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 LHC did not reveal new obstructive CAD and he underwent EPS which did not reveal endocardial substrate amenable for ablation. RHC revealed severely depressed cardiac output and he was transferred to [facility] 5/26 for advanced therapies evaluation.    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 He improved significantly with sequential uptitration of inotropes and increased pacing rate, but on 6/6 he had worsening JAGRUTI. He is s/p RHC which revealed severely elevated filling pressures, severe cpcPH, and CI of 1.9 on milrinone 0.5. IABP was placed and his renal function/PAPs have improved. He is MCS dependent and was inadequately supported with high dose inotropes, so was listed UNOS status 2e for OHT, awaiting suitable donor. However, was made status 7 as he became febrile, last 6/7 T 38. He has been afebrile since and blood cultures from 6/7 with NGTD x 48 hours and his leukocytosis has not worsened. Discussed with transplant ID and since bld cx negative x48hrs he has been re-listed UNOS status 2e.     A suitable donor was identified and patient underwent OHT with Dr. Earl/Maria C on 6/21 (ischemic time 261 mins, CMV +/+, Toxo -/-). On POD 1 IABP was removed. Extubated afternoon 6/22    Cori-op antibiotics with  Vancomycin- check level prior to next dose  Cefepime 1 gram q 8hr  oral Vanco    CMV:  intermediate risk  will eventually need valcyte once electrolytes and renal function stabilizes    toxo  low risk    PJP  will eventually need Bactrim or Mepron    thrush:  nystatin    Reggie Escalante MD  Can be called via Teams  After 5pm/weekends [phone]

## 2022-06-23 NOTE — PROGRESS NOTE ADULT - ASSESSMENT
Past psychiatric history: Denied.      Psychological assessment:     Mental Status Exam: Seen sitting in chair in CTU with high flow oxygen. Alert, oriented x4. Appropriate eye contact. Speech normal volume and rate. Thought process was logical and goal-oriented, content appropriate to conversation. No evidence of jack, delusions, psychosis. Denies SI/HI. Mood euthymic. Affect full range. Insight into disease good. Immediate judgment gooe.      Dx: Adjustment disorder, unspecified. F43.20. Systolic heart failure, I50.2.    Recommendations:   Behavioral Cardiology will follow as needed.    service  Would benefit from involving family members in communication regarding medical status and recommendations  Patient would benefit from continued involvement from behavioral cardiology    16 minutes spent on patient encounter     Past psychiatric history: Denied.      Psychological assessment: Managing pain with medication, current pain 3/10. Today stood and sat in chair. Scheduled to begin working with PT tomorrow. Sleeping well. Denies symptoms of anxiety/depression. Receptive to supportive therapy.     Mental Status Exam: Seen sitting in chair in CTU with high flow oxygen. Alert, oriented x4. Appropriate eye contact. Speech normal volume and rate. Thought process was logical and goal-oriented, content appropriate to conversation. No evidence of jack, delusions, psychosis. Denies SI/HI. Mood euthymic. Affect full range. Insight into disease good. Immediate judgment gooe.      Dx: Adjustment disorder, unspecified. F43.20. Systolic heart failure, I50.2.  Heart transplant, Z94.1    Recommendations:   Behavioral Cardiology will follow as needed.    service  Would benefit from involving family members in communication regarding medical status and recommendations  Patient would benefit from continued involvement from behavioral cardiology    16 minutes spent on patient encounter

## 2022-06-23 NOTE — PROGRESS NOTE ADULT - CRITICAL CARE ATTENDING COMMENT
successful extubation.   awake alert orientated. being sat in a chair.   meds: NO 10ppm,  2.5, Milrinone .4, cefipime,  vanco IV , vanco PO cellcept 1g IV bid, solumedrol taper, prograf .5 s/l bid, PRN lasix and bumex  CVP 14, PA 61/24 31, PA sat 75   HR SR , 101/-139/ T low 95.9   I/O: -420  06-23  139  |  105  |  30<H>  ----------------------------<  134<H>  4.4   |  25  |  1.27  Ca    9.2      23 Jun 2022 00:52  Phos  6.2     06-23  Mg     2.2     06-23  TPro  5.3<L>  /  Alb  3.3  /  TBili  1.4<H>  /  DBili  x   /  AST  73<H>  /  ALT  38  /  AlkPhos  46  06-23                        8.0    17.84 )-----------( 164      ( 23 Jun 2022 00:52 )             24.4  excellent progress s/p heart transplant on 6.21  reactive pulmonary pressures. volume overload  Plan:  make lasix 40 IV bid   make prograf po and adjust level.   Abs for 72 hrs.   ambulate  Dany Dominique

## 2022-06-23 NOTE — PROGRESS NOTE ADULT - SUBJECTIVE AND OBJECTIVE BOX
Patient seen and examined at the bedside.    Remained critically ill on continuous ICU monitoring.    OBJECTIVE:  Vital Signs Last 24 Hrs  T(C): 36.1 (23 Jun 2022 08:00), Max: 36.4 (22 Jun 2022 12:00)  T(F): 97 (23 Jun 2022 08:00), Max: 97.5 (22 Jun 2022 12:00)  HR: 96 (23 Jun 2022 09:00) (84 - 101)  BP: --  BP(mean): --  RR: 14 (23 Jun 2022 09:00) (12 - 26)  SpO2: 99% (23 Jun 2022 09:00) (95% - 100%)                   Assessment:  66 year old man w/ PMHx HTN, HLD, type 2 DM, chronic HFrEF (EF 25-30% by Echo), complete heart block s/p PPM (in 2006, upgraded to BiV-ICD in 09/2016), CAD (s/p recent BERNABE mid LAD at [facility] on 04/18/2022), and stage II CKD (baseline Cr ~1.3) presented with near syncope to OSH found to have 41 episodes of VT on device, s/p unsuccessful VT ablation and transferred to [facility] for management of cardiogenic shock and advanced therapy eval.     Heart Failure s/p surgery on 6/21   Hemodynamic instability  Hypovolemia  Post op respiratory insufficiency  Acute blood loss anemia  Cardiogenic Shock   Leukocytosis     Plan:   ***Neuro***  [x] Nonfocal    Post operative neuro assessment     ***Cardiovascular***  Immunosuppressive regimen with tacrolimus, Cellcept, and methylprednisolone   Invasive hemodynamic monitoring, assess perfusion indices   SR / CVP 10 / MAP 71 / PAP 34 / Hct 24.4 / Lactate 0.8  [x] Primacor- 0.4mcg [x] Dobutamine- 2mcg   [x] Temporary pacing- off to box   Continuos reassessment of hemodynamics    Monitor chest tube outputs   [x] VTE ppx with SQ heparin   Serial EKG and cardiac enzymes     ***Pulmonary***  [x] HFO2 30L/min, 40% [x] Maricruz- 10ppm   Encourage incentive spirometry, continue pulse ox monitoring, follow ABGs             ***GI***  [x] Diet: Consistent Carb.   [x] Protonix    Bowel regimen with Miralax     ***Renal***  [x] CKD   Continue to monitor I/Os, BUN/Creatinine.   Replete lytes PRN  Mary present     ***ID***  Cefepime and vancomycin for transplant    Postoperative prophylaxis with vancomycin     ***Endocrine***  [x] DM2aa : HbA1c 6.2%                - [x] Insulin gtt             - Need tight glycemic control to prevent wound infection.         Patient requires continuous monitoring with bedside rhythm monitoring, pulse oximetry monitoring, and continuous central venous and arterial pressure monitoring; and intermittent blood gas analysis. Care plan discussed with the ICU care team.   Patient remained critical, at risk for life threatening decompensation.    I have spent 30 minutes providing critical care management to this patient.    By signing my name below, I, John Falcon, attest that this documentation has been prepared under the direction and in the presence of Iban Wan MD   Electronically signed: Mini Guo, 06-23-22 @ 09:20    I, Iban Wan, personally performed the services described in this documentation. all medical record entries made by the scribe were at my direction and in my presence. I have reviewed the chart and agree that the record reflects my personal performance and is accurate and complete  Electronically signed: Iban Wan MD  Patient seen and examined at the bedside.    Remained critically ill on continuous ICU monitoring.    OBJECTIVE:  Vital Signs Last 24 Hrs  T(C): 36.1 (23 Jun 2022 08:00), Max: 36.4 (22 Jun 2022 12:00)  T(F): 97 (23 Jun 2022 08:00), Max: 97.5 (22 Jun 2022 12:00)  HR: 96 (23 Jun 2022 09:00) (84 - 101)  BP: --  BP(mean): --  RR: 14 (23 Jun 2022 09:00) (12 - 26)  SpO2: 99% (23 Jun 2022 09:00) (95% - 100%)                   Assessment:  66 year old man w/ PMHx HTN, HLD, type 2 DM, chronic HFrEF (EF 25-30% by Echo), complete heart block s/p PPM (in 2006, upgraded to BiV-ICD in 09/2016), CAD (s/p recent BERNABE mid LAD at [facility] on 04/18/2022), and stage II CKD (baseline Cr ~1.3) presented with near syncope to OSH found to have 41 episodes of VT on device, s/p unsuccessful VT ablation and transferred to [facility] for management of cardiogenic shock and advanced therapy eval.     Heart Failure s/p surgery on 6/21   Hemodynamic instability  Hypovolemia  Post op respiratory insufficiency  Acute blood loss anemia  Cardiogenic Shock   Leukocytosis     Plan:   ***Neuro***  [x] Nonfocal    Post operative neuro assessment   Ambulate as tolerated     ***Cardiovascular***  Immunosuppressive regimen with tacrolimus, Cellcept, and methylprednisolone   Invasive hemodynamic monitoring, assess perfusion indices, d/c one A-line today   SR / CVP 10 / MAP 71 / PAP 34 / Hct 24.4 / Lactate 0.8  [x] Primacor- 0.4mcg [x] Dobutamine- 2mcg   [x] Temporary pacing- off to box   Continuos reassessment of hemodynamics    Monitor chest tube outputs   [x] VTE ppx with SQ heparin   Serial EKG and cardiac enzymes     ***Pulmonary***  [x] HFO2 30L/min, 40% [x] Maricruz- 10ppm   Encourage incentive spirometry, continue pulse ox monitoring, follow ABGs             ***GI***  [x] Diet: Consistent Carb.   [x] Protonix    Bowel regimen with Miralax     ***Renal***  [x] CKD   Continue to monitor I/Os, BUN/Creatinine.   Replete lytes PRN  Mary present   Diuresis today, goal neg. 1L each shift     ***ID***  Cefepime and vancomycin for transplant    Postoperative prophylaxis with vancomycin     ***Endocrine***  [x] DM2 : HbA1c 6.2%                - [x] Insulin gtt             - Need tight glycemic control to prevent wound infection.         Patient requires continuous monitoring with bedside rhythm monitoring, pulse oximetry monitoring, and continuous central venous and arterial pressure monitoring; and intermittent blood gas analysis. Care plan discussed with the ICU care team.   Patient remained critical, at risk for life threatening decompensation.    I have spent 75 minutes providing critical care management to this patient.    By signing my name below, I, John Falcon, attest that this documentation has been prepared under the direction and in the presence of Iban Wan MD   Electronically signed: Mini Guo, 06-23-22 @ 09:20    I, Iban Wan, personally performed the services described in this documentation. all medical record entries made by the nelidaibshira were at my direction and in my presence. I have reviewed the chart and agree that the record reflects my personal performance and is accurate and complete  Electronically signed: Iabn Wan MD

## 2022-06-23 NOTE — PROGRESS NOTE ADULT - SUBJECTIVE AND OBJECTIVE BOX
Behavioral Cardiology Progress Note     History of present illness: Mr. Du is a 66-year-old male w/PMHx of HTN, HLD, type 2 DM, chronic HFrEF (25-30%), complete heart block s/p PPM (in , upgraded to BiV-ICD in 2016), CAD (s/p recent BERNABE mid LAD at [facility] on 2022), and stage II CKD), admitted for intermittent vtach, now s/p unsuccessful VT ablation. Patient transferred from [facility] to [facility] for further management and evaluation for LVAD vs. OHT.     Social history: Patient lives with his wife (Teresa, 68 y/o, [phone]) and 26 y/o daughter (Priyanka) in [place], [place], in private home. Patient has two other adult sons (Philip, age 35; and Aldo, age 30) who live in [place] and [place]. Patient’s mother  in her 80s of natural causes and father  in his 80s from a heart attack. Patient has four siblings who live in the area and who he is close to. Patient is a citizen, [place] from [place] approximately 40 years ago. 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 Education: high school.    Substance use:   Tobacco: Patient started smoking approximately at age 21, stopped in  at time of heart block. During that time, he smoked approximately 1-2 cigarettes/day   Alcohol: Reported no current alcohol use, last time he used alcohol was prior to 2021. Before that time, he used to drink approximately 4 beers at a time, 2-3 times/week.   Drug: Denies current or past substance use.

## 2022-06-23 NOTE — CHART NOTE - NSCHARTNOTEFT_GEN_A_CORE
Nutrition Follow Up Note  Patient seen for: Nutrition follow-up s/p OHT    Chart reviewed, events noted. Per chart: 66M, PMH of ACC/AHA Stage D mixed NICM/ICM, DM2, CKD III. Admitted with cardiogenic shock and many episodes of VT. Underwent evaluation for advanced therapies. IABP placed 2022 and was listed for heart transplant UNOS status 2E. Now s/p OHT  after suitable donor identified; IABP removed POD1.    Source: EMR, Team    Diet, Clear Liquid:   Consistent Carbohydrate {No Snacks} (CSTCHO) (22 @ 20:14)  Diet, Regular:   Consistent Carbohydrate {No Snacks} (CSTCHO) (22 @ 06:41)    Is current diet order appropriate/adequate? [x] Yes  []  No:   - Pt previously on consistent CHO, Low Na diet until made NPO  pending OR; previously was with good appetite/PO intake upon prior RD visits. Pt remained NPO until yesterday () evening when advanced to CLD    Nutrition-Related Events:  - Phos 6.2 <H> this AM  - Now ordered for Steroid taper, Cellcept, & Tacro s/p OHT; monitor BG & K+ levels, respectively   - Continues with inotropic support (milrinone & )  - Insulin gtt ordered to optimize BG  - Ongoing diet education in setting of AT evaluation    GI:  Last BM  - currently noted w/ abdominal distention. Bowel Regimen? [x] Yes (Miralax)  - Currently on abx course and receiving PPI   - Chest Tubes in place, output as follows:     Meds2: 145ml ( thus far)     Meds1: 80ml ( thus far)     Meds3: 30ml ( thus far)    Weights in k.5 (-), 75.1 (-), 70.6 (-), 70.1 (-), 71.8 (-), 70.2 (-), 71.4 (-17), 71.6 (-15), 72.1 (-10), 74.2 (-), 79.8 (-)  - Weight fluctuations noted in-house, likely 2/2 fluid shifts as pt received diuretic therapies and currently with postoperative edema. Will continue to monitor/trend weight status.     Admit Dosing Weight: 162 Ibs/73.5 kg ()  Height: 5'10" (177.8 cm)  BMI (kg/m2): 23.3 ()  IBW: 166 Ibs/75.3 kg  UBW PTA: ~168 lbs/76.2 kg  Pre-Transplant Weight: 155.6 Ibs/70.6 kg ()    MEDICATIONS  (STANDING):  cefepime   IVPB  dextrose 50% Injectable  dextrose 50% Injectable  DOBUTamine Infusion  insulin regular Infusion  methylPREDNISolone sodium succinate Injectable  milrinone Infusion  pantoprazole  Injectable  polyethylene glycol 3350  sodium chloride 0.9%.  vancomycin    Solution  vancomycin  IVPB    Pertinent Labs:  @ 00:52: Na 139, BUN 30<H>, Cr 1.27, <H>, K+ 4.4, Phos 6.2<H>, Mg 2.2, Alk Phos 46, ALT/SGPT 38, AST/SGOT 73<H>, HbA1c --    A1C with Estimated Average Glucose Result: 6.2 % (22 @ 14:17)  A1C with Estimated Average Glucose Result: 6.9 % (22 @ 15:40)  A1C with Estimated Average Glucose Result: 6.7 % (22 @ 15:05)    Finger Sticks:  POCT Blood Glucose.: 189 mg/dL ( @ 10:46)  POCT Blood Glucose.: 213 mg/dL ( @ 09:53)  POCT Blood Glucose.: 189 mg/dL ( @ 09:05)  POCT Blood Glucose.: 126 mg/dL ( @ 08:05)  POCT Blood Glucose.: 121 mg/dL ( @ 06:48)  POCT Blood Glucose.: 115 mg/dL ( @ 06:13)  POCT Blood Glucose.: 119 mg/dL ( @ 05:03)  POCT Blood Glucose.: 119 mg/dL ( @ 04:08)  POCT Blood Glucose.: 120 mg/dL ( @ 03:01)  POCT Blood Glucose.: 138 mg/dL ( @ 01:56)  POCT Blood Glucose.: 143 mg/dL ( @ 00:56)  POCT Blood Glucose.: 139 mg/dL ( @ 00:11)  POCT Blood Glucose.: 149 mg/dL ( @ 22:57)  POCT Blood Glucose.: 145 mg/dL ( @ 22:03)  POCT Blood Glucose.: 134 mg/dL ( @ 21:01)  POCT Blood Glucose.: 118 mg/dL ( @ 20:06)  POCT Blood Glucose.: 113 mg/dL ( @ 18:50)  POCT Blood Glucose.: 127 mg/dL ( @ 17:48)  POCT Blood Glucose.: 129 mg/dL ( @ 15:46)  POCT Blood Glucose.: 123 mg/dL ( @ 14:51)  POCT Blood Glucose.: 127 mg/dL ( @ 13:52)  POCT Blood Glucose.: 109 mg/dL ( @ 12:48)  POCT Blood Glucose.: 126 mg/dL ( @ 11:42)      Skin per nursing documentation: + midsternal surgical incision; no pressure injuries noted  Edema: 1+ generalized    Estimated Nutritional Needs  Recalculated based on Pre-Transplant Weight: 155.6 Ibs/70.6 kg ()  Energy Needs (30-35 kcals/kg):   Protein Needs (1.3-1.6):    Previous Nutrition Diagnosis: Food & Nutrition Related Knowledge Deficit  Nutrition Diagnosis is: [x] ongoing  - addressed with ongoing diet education for LVAD/heart transplant evaluation    New Nutrition Diagnosis: Increased Nutrient Needs related to increased physiological demand in setting of postoperative healing as evidenced by POD2 OHT via sternotomy    Nutrition Care Plan:  [x] In Progress  [] Achieved  [] Not applicable    Recommendations:          Monitoring and Evaluation:   Continue to monitor nutritional intake, tolerance to diet prescription, weights, labs, skin integrity  RD remains available upon request and will follow up per protocol    Sallie Serna, MS, RD, CDN, [facility]  pager #[phone] Nutrition Follow Up Note  Patient seen for: Nutrition follow-up s/p OHT    Chart reviewed, events noted. Per chart: 66M, PMH of ACC/AHA Stage D mixed NICM/ICM, DM2, CKD III. Admitted with cardiogenic shock and many episodes of VT. Underwent evaluation for advanced therapies. IABP placed 2022 and was listed for heart transplant UNOS status 2E. Now s/p OHT  after suitable donor identified; IABP removed POD1.    Source: EMR, Team, Pt    Diet, Clear Liquid:   Consistent Carbohydrate {No Snacks} (CSTCHO) (22 @ 20:14)  Diet, Regular:   Consistent Carbohydrate {No Snacks} (CSTCHO) (22 @ 06:41)    Is current diet order appropriate/adequate? [x] Yes  []  No:   - Pt previously on consistent CHO, Low Na diet until made NPO  pending OR; previously was with good appetite/PO intake upon prior RD visits. Pt remained NPO until yesterday () evening when advanced to CLD    Pt OOB in chair with Clear liquid tray - Pt denies chewing/swallowing issues, and is tolerating CLD well. Denies nausea/vomiting, diarrhea/constipation, however admits has not had BM since prior to surgery. Reinforced food safety protocol while in-house - Pt verbalized understanding.      Nutrition-Related Events:  - Phos 6.2 <H> this AM  - Now ordered for Steroid taper, Cellcept, & Tacro s/p OHT; monitor BG & K+ levels, respectively   - Continues with inotropic support (milrinone & )  - Insulin gtt ordered to optimize BG  - Ongoing diet education in setting of AT evaluation    GI:  Last BM  - currently noted w/ abdominal distention. Bowel Regimen? [x] Yes (Miralax)  - Currently on abx course and receiving PPI   - Chest Tubes in place, output as follows:     Meds2: 145ml ( thus far)     Meds1: 80ml ( thus far)     Meds3: 30ml ( thus far)    Weights in k.5 (), 75.1 (), 70.6 (), 70.1 (), 71.8 (), 70.2 (), 71.4 (), 71.6 (06-15), 72.1 (06-10), 74.2 (), 79.8 ()  - Weight fluctuations noted in-house, likely 2/2 fluid shifts as pt received diuretic therapies and currently with postoperative edema. Will continue to monitor/trend weight status.     Admit Dosing Weight: 162 Ibs/73.5 kg ()  Height: 5'10" (177.8 cm)  BMI (kg/m2): 23.3 ()  IBW: 166 Ibs/75.3 kg  UBW PTA: ~168 lbs/76.2 kg  Pre-Transplant Weight: 155.6 Ibs/70.6 kg ()    MEDICATIONS  (STANDING):  cefepime   IVPB  dextrose 50% Injectable  dextrose 50% Injectable  DOBUTamine Infusion  insulin regular Infusion  methylPREDNISolone sodium succinate Injectable  milrinone Infusion  pantoprazole  Injectable  polyethylene glycol 3350  sodium chloride 0.9%.  vancomycin    Solution  vancomycin  IVPB    Pertinent Labs:  @ 00:52: Na 139, BUN 30<H>, Cr 1.27, <H>, K+ 4.4, Phos 6.2<H>, Mg 2.2, Alk Phos 46, ALT/SGPT 38, AST/SGOT 73<H>, HbA1c --    A1C with Estimated Average Glucose Result: 6.2 % (22 @ 14:17)  A1C with Estimated Average Glucose Result: 6.9 % (22 @ 15:40)  A1C with Estimated Average Glucose Result: 6.7 % (22 @ 15:05)    Finger Sticks:  POCT Blood Glucose.: 189 mg/dL ( @ 10:46)  POCT Blood Glucose.: 213 mg/dL ( @ 09:53)  POCT Blood Glucose.: 189 mg/dL ( @ 09:05)  POCT Blood Glucose.: 126 mg/dL ( @ 08:05)  POCT Blood Glucose.: 121 mg/dL ( @ 06:48)  POCT Blood Glucose.: 115 mg/dL ( @ 06:13)  POCT Blood Glucose.: 119 mg/dL ( @ 05:03)  POCT Blood Glucose.: 119 mg/dL ( @ 04:08)  POCT Blood Glucose.: 120 mg/dL ( @ 03:01)  POCT Blood Glucose.: 138 mg/dL ( @ 01:56)  POCT Blood Glucose.: 143 mg/dL ( @ 00:56)  POCT Blood Glucose.: 139 mg/dL ( @ 00:11)  POCT Blood Glucose.: 149 mg/dL ( @ 22:57)  POCT Blood Glucose.: 145 mg/dL ( @ 22:03)  POCT Blood Glucose.: 134 mg/dL ( @ 21:01)  POCT Blood Glucose.: 118 mg/dL ( @ 20:06)  POCT Blood Glucose.: 113 mg/dL ( @ 18:50)  POCT Blood Glucose.: 127 mg/dL ( @ 17:48)  POCT Blood Glucose.: 129 mg/dL ( @ 15:46)  POCT Blood Glucose.: 123 mg/dL ( @ 14:51)  POCT Blood Glucose.: 127 mg/dL ( @ 13:52)  POCT Blood Glucose.: 109 mg/dL ( @ 12:48)  POCT Blood Glucose.: 126 mg/dL ( @ 11:42)      Skin per nursing documentation: + midsternal surgical incision; no pressure injuries noted  Edema: 1+ generalized    Estimated Nutritional Needs  Recalculated based on Pre-Transplant Weight: 155.6 Ibs/70.6 kg ()  Energy Needs (30-35 kcals/kg): 3589-8937  Protein Needs (1.3-1.6): 91.8-113    Previous Nutrition Diagnosis: Food & Nutrition Related Knowledge Deficit  Nutrition Diagnosis is: [x] ongoing  - addressed with ongoing diet education for LVAD/heart transplant evaluation    New Nutrition Diagnosis: Increased Nutrient Needs related to increased physiological demand in setting of postoperative healing as evidenced by POD2 OHT via sternotomy    Nutrition Care Plan:  [x] In Progress  [] Achieved  [] Not applicable    Recommendations:      1. Recommend as diet advanced change to Consistent CHO diet with Glucerna Shake BID - defer textures/consistencies to SLP/Team  2. Consider addition of Multivitamin supplementation pending no existing contraindications   3. Pt without BM since surgery, monitor need for more aggressive bowel regimen - defer to team  4. Diet education ongoing; RD remains available     Monitoring and Evaluation:   Continue to monitor nutritional intake, tolerance to diet prescription, weights, labs, skin integrity  RD remains available upon request and will follow up per protocol    Sallie Serna MS, RD, CDN, [facility]  pager #[phone]

## 2022-06-23 NOTE — PROGRESS NOTE ADULT - PROBLEM SELECTOR PLAN 3
- CMV +/+, will be started on Valcyte when feasible   - Toxo -/-, no ppx needed, will start bactrim for PJP ppx when appropriate  - remains on IV antibiotics post op for ppx (plan to stop abx tomorrow)

## 2022-06-24 DIAGNOSIS — R71.0 PRECIPITOUS DROP IN HEMATOCRIT: ICD-10-CM

## 2022-06-24 LAB
ALBUMIN SERPL ELPH-MCNC: 3.8 G/DL — SIGNIFICANT CHANGE UP (ref 3.3–5)
ALP SERPL-CCNC: 46 U/L — SIGNIFICANT CHANGE UP (ref 40–120)
ALT FLD-CCNC: 31 U/L — SIGNIFICANT CHANGE UP (ref 10–45)
ANION GAP SERPL CALC-SCNC: 12 MMOL/L — SIGNIFICANT CHANGE UP (ref 5–17)
AST SERPL-CCNC: 31 U/L — SIGNIFICANT CHANGE UP (ref 10–40)
BASE EXCESS BLDMV CALC-SCNC: -1.1 MMOL/L — SIGNIFICANT CHANGE UP (ref -3–3)
BASE EXCESS BLDMV CALC-SCNC: -2 MMOL/L — SIGNIFICANT CHANGE UP (ref -3–3)
BASE EXCESS BLDMV CALC-SCNC: -2 MMOL/L — SIGNIFICANT CHANGE UP (ref -3–3)
BASE EXCESS BLDMV CALC-SCNC: -4.4 MMOL/L — LOW (ref -3–3)
BASE EXCESS BLDMV CALC-SCNC: 0.5 MMOL/L — SIGNIFICANT CHANGE UP (ref -3–3)
BILIRUB SERPL-MCNC: 0.8 MG/DL — SIGNIFICANT CHANGE UP (ref 0.2–1.2)
BUN SERPL-MCNC: 43 MG/DL — HIGH (ref 7–23)
CALCIUM SERPL-MCNC: 9 MG/DL — SIGNIFICANT CHANGE UP (ref 8.4–10.5)
CHLORIDE SERPL-SCNC: 101 MMOL/L — SIGNIFICANT CHANGE UP (ref 96–108)
CO2 BLDMV-SCNC: 21 MMOL/L — SIGNIFICANT CHANGE UP (ref 21–29)
CO2 BLDMV-SCNC: 24 MMOL/L — SIGNIFICANT CHANGE UP (ref 21–29)
CO2 BLDMV-SCNC: 25 MMOL/L — SIGNIFICANT CHANGE UP (ref 21–29)
CO2 BLDMV-SCNC: 26 MMOL/L — SIGNIFICANT CHANGE UP (ref 21–29)
CO2 BLDMV-SCNC: 27 MMOL/L — SIGNIFICANT CHANGE UP (ref 21–29)
CO2 SERPL-SCNC: 25 MMOL/L — SIGNIFICANT CHANGE UP (ref 22–31)
CREAT SERPL-MCNC: 1.92 MG/DL — HIGH (ref 0.5–1.3)
EGFR: 38 ML/MIN/1.73M2 — LOW
GAS PNL BLDA: SIGNIFICANT CHANGE UP
GAS PNL BLDMV: SIGNIFICANT CHANGE UP
GLUCOSE BLDC GLUCOMTR-MCNC: 100 MG/DL — HIGH (ref 70–99)
GLUCOSE BLDC GLUCOMTR-MCNC: 105 MG/DL — HIGH (ref 70–99)
GLUCOSE BLDC GLUCOMTR-MCNC: 106 MG/DL — HIGH (ref 70–99)
GLUCOSE BLDC GLUCOMTR-MCNC: 107 MG/DL — HIGH (ref 70–99)
GLUCOSE BLDC GLUCOMTR-MCNC: 107 MG/DL — HIGH (ref 70–99)
GLUCOSE BLDC GLUCOMTR-MCNC: 116 MG/DL — HIGH (ref 70–99)
GLUCOSE BLDC GLUCOMTR-MCNC: 117 MG/DL — HIGH (ref 70–99)
GLUCOSE BLDC GLUCOMTR-MCNC: 120 MG/DL — HIGH (ref 70–99)
GLUCOSE BLDC GLUCOMTR-MCNC: 124 MG/DL — HIGH (ref 70–99)
GLUCOSE BLDC GLUCOMTR-MCNC: 126 MG/DL — HIGH (ref 70–99)
GLUCOSE BLDC GLUCOMTR-MCNC: 129 MG/DL — HIGH (ref 70–99)
GLUCOSE BLDC GLUCOMTR-MCNC: 142 MG/DL — HIGH (ref 70–99)
GLUCOSE BLDC GLUCOMTR-MCNC: 143 MG/DL — HIGH (ref 70–99)
GLUCOSE BLDC GLUCOMTR-MCNC: 148 MG/DL — HIGH (ref 70–99)
GLUCOSE BLDC GLUCOMTR-MCNC: 150 MG/DL — HIGH (ref 70–99)
GLUCOSE BLDC GLUCOMTR-MCNC: 152 MG/DL — HIGH (ref 70–99)
GLUCOSE BLDC GLUCOMTR-MCNC: 155 MG/DL — HIGH (ref 70–99)
GLUCOSE BLDC GLUCOMTR-MCNC: 159 MG/DL — HIGH (ref 70–99)
GLUCOSE BLDC GLUCOMTR-MCNC: 163 MG/DL — HIGH (ref 70–99)
GLUCOSE BLDC GLUCOMTR-MCNC: 166 MG/DL — HIGH (ref 70–99)
GLUCOSE BLDC GLUCOMTR-MCNC: 197 MG/DL — HIGH (ref 70–99)
GLUCOSE BLDC GLUCOMTR-MCNC: 238 MG/DL — HIGH (ref 70–99)
GLUCOSE BLDC GLUCOMTR-MCNC: 260 MG/DL — HIGH (ref 70–99)
GLUCOSE BLDC GLUCOMTR-MCNC: 92 MG/DL — SIGNIFICANT CHANGE UP (ref 70–99)
GLUCOSE BLDC GLUCOMTR-MCNC: 94 MG/DL — SIGNIFICANT CHANGE UP (ref 70–99)
GLUCOSE SERPL-MCNC: 121 MG/DL — HIGH (ref 70–99)
HCO3 BLDMV-SCNC: 20 MMOL/L — SIGNIFICANT CHANGE UP (ref 20–28)
HCO3 BLDMV-SCNC: 23 MMOL/L — SIGNIFICANT CHANGE UP (ref 20–28)
HCO3 BLDMV-SCNC: 24 MMOL/L — SIGNIFICANT CHANGE UP (ref 20–28)
HCO3 BLDMV-SCNC: 25 MMOL/L — SIGNIFICANT CHANGE UP (ref 20–28)
HCO3 BLDMV-SCNC: 26 MMOL/L — SIGNIFICANT CHANGE UP (ref 20–28)
HCT VFR BLD CALC: 22.6 % — LOW (ref 39–50)
HGB BLD-MCNC: 7.5 G/DL — LOW (ref 13–17)
HOROWITZ INDEX BLDMV+IHG-RTO: 40 — SIGNIFICANT CHANGE UP
MAGNESIUM SERPL-MCNC: 2.1 MG/DL — SIGNIFICANT CHANGE UP (ref 1.6–2.6)
MCHC RBC-ENTMCNC: 30.2 PG — SIGNIFICANT CHANGE UP (ref 27–34)
MCHC RBC-ENTMCNC: 33.2 GM/DL — SIGNIFICANT CHANGE UP (ref 32–36)
MCV RBC AUTO: 91.1 FL — SIGNIFICANT CHANGE UP (ref 80–100)
NRBC # BLD: 0 /100 WBCS — SIGNIFICANT CHANGE UP (ref 0–0)
O2 CT VFR BLD CALC: 46 MMHG — SIGNIFICANT CHANGE UP (ref 30–65)
O2 CT VFR BLD CALC: 48 MMHG — SIGNIFICANT CHANGE UP (ref 30–65)
PCO2 BLDMV: 35 MMHG — SIGNIFICANT CHANGE UP (ref 30–65)
PCO2 BLDMV: 40 MMHG — SIGNIFICANT CHANGE UP (ref 30–65)
PCO2 BLDMV: 43 MMHG — SIGNIFICANT CHANGE UP (ref 30–65)
PCO2 BLDMV: 44 MMHG — SIGNIFICANT CHANGE UP (ref 30–65)
PCO2 BLDMV: 45 MMHG — SIGNIFICANT CHANGE UP (ref 30–65)
PH BLDMV: 7.35 — SIGNIFICANT CHANGE UP (ref 7.32–7.45)
PH BLDMV: 7.35 — SIGNIFICANT CHANGE UP (ref 7.32–7.45)
PH BLDMV: 7.37 — SIGNIFICANT CHANGE UP (ref 7.32–7.45)
PH BLDMV: 7.37 — SIGNIFICANT CHANGE UP (ref 7.32–7.45)
PH BLDMV: 7.38 — SIGNIFICANT CHANGE UP (ref 7.32–7.45)
PHOSPHATE SERPL-MCNC: 5.1 MG/DL — HIGH (ref 2.5–4.5)
PLATELET # BLD AUTO: 135 K/UL — LOW (ref 150–400)
POTASSIUM SERPL-MCNC: 4.3 MMOL/L — SIGNIFICANT CHANGE UP (ref 3.5–5.3)
POTASSIUM SERPL-SCNC: 4.3 MMOL/L — SIGNIFICANT CHANGE UP (ref 3.5–5.3)
PROT SERPL-MCNC: 5.7 G/DL — LOW (ref 6–8.3)
RBC # BLD: 2.48 M/UL — LOW (ref 4.2–5.8)
RBC # FLD: 17.2 % — HIGH (ref 10.3–14.5)
SAO2 % BLDMV: 74 — SIGNIFICANT CHANGE UP (ref 60–90)
SAO2 % BLDMV: 75.5 — SIGNIFICANT CHANGE UP (ref 60–90)
SAO2 % BLDMV: 75.7 — SIGNIFICANT CHANGE UP (ref 60–90)
SAO2 % BLDMV: 76.5 — SIGNIFICANT CHANGE UP (ref 60–90)
SAO2 % BLDMV: 77 — SIGNIFICANT CHANGE UP (ref 60–90)
SODIUM SERPL-SCNC: 138 MMOL/L — SIGNIFICANT CHANGE UP (ref 135–145)
TACROLIMUS SERPL-MCNC: 4.6 NG/ML — SIGNIFICANT CHANGE UP
WBC # BLD: 16.12 K/UL — HIGH (ref 3.8–10.5)
WBC # FLD AUTO: 16.12 K/UL — HIGH (ref 3.8–10.5)

## 2022-06-24 PROCEDURE — 99292 CRITICAL CARE ADDL 30 MIN: CPT

## 2022-06-24 PROCEDURE — 99291 CRITICAL CARE FIRST HOUR: CPT

## 2022-06-24 PROCEDURE — 99223 1ST HOSP IP/OBS HIGH 75: CPT

## 2022-06-24 PROCEDURE — 99232 SBSQ HOSP IP/OBS MODERATE 35: CPT

## 2022-06-24 PROCEDURE — 71045 X-RAY EXAM CHEST 1 VIEW: CPT | Mod: 26

## 2022-06-24 PROCEDURE — 99233 SBSQ HOSP IP/OBS HIGH 50: CPT

## 2022-06-24 RX ORDER — MYCOPHENOLATE MOFETIL 250 MG/1
1000 CAPSULE ORAL EVERY 12 HOURS
Refills: 0 | Status: DISCONTINUED | OUTPATIENT
Start: 2022-06-24 | End: 2022-07-08

## 2022-06-24 RX ORDER — CALCIUM GLUCONATE 100 MG/ML
1 VIAL (ML) INTRAVENOUS ONCE
Refills: 0 | Status: COMPLETED | OUTPATIENT
Start: 2022-06-24 | End: 2022-06-24

## 2022-06-24 RX ORDER — POTASSIUM CHLORIDE 20 MEQ
10 PACKET (EA) ORAL ONCE
Refills: 0 | Status: DISCONTINUED | OUTPATIENT
Start: 2022-06-24 | End: 2022-06-24

## 2022-06-24 RX ORDER — VASOPRESSIN 20 [USP'U]/ML
0.03 INJECTION INTRAVENOUS
Qty: 50 | Refills: 0 | Status: DISCONTINUED | OUTPATIENT
Start: 2022-06-24 | End: 2022-06-25

## 2022-06-24 RX ORDER — LACTULOSE 10 G/15ML
20 SOLUTION ORAL ONCE
Refills: 0 | Status: COMPLETED | OUTPATIENT
Start: 2022-06-24 | End: 2022-06-24

## 2022-06-24 RX ADMIN — MUPIROCIN 1 APPLICATION(S): 20 OINTMENT TOPICAL at 18:13

## 2022-06-24 RX ADMIN — Medication 40 MILLIGRAM(S): at 05:51

## 2022-06-24 RX ADMIN — POLYETHYLENE GLYCOL 3350 17 GRAM(S): 17 POWDER, FOR SOLUTION ORAL at 12:14

## 2022-06-24 RX ADMIN — Medication 125 MILLIGRAM(S): at 08:26

## 2022-06-24 RX ADMIN — TACROLIMUS 2 MILLIGRAM(S): 5 CAPSULE ORAL at 08:26

## 2022-06-24 RX ADMIN — Medication 650 MILLIGRAM(S): at 06:19

## 2022-06-24 RX ADMIN — TACROLIMUS 2 MILLIGRAM(S): 5 CAPSULE ORAL at 20:20

## 2022-06-24 RX ADMIN — TRAMADOL HYDROCHLORIDE 25 MILLIGRAM(S): 50 TABLET ORAL at 21:40

## 2022-06-24 RX ADMIN — Medication 36 MILLIGRAM(S): at 08:26

## 2022-06-24 RX ADMIN — HEPARIN SODIUM 5000 UNIT(S): 5000 INJECTION INTRAVENOUS; SUBCUTANEOUS at 05:50

## 2022-06-24 RX ADMIN — Medication 125 MILLIGRAM(S): at 20:22

## 2022-06-24 RX ADMIN — PANTOPRAZOLE SODIUM 40 MILLIGRAM(S): 20 TABLET, DELAYED RELEASE ORAL at 12:14

## 2022-06-24 RX ADMIN — Medication 36 MILLIGRAM(S): at 20:22

## 2022-06-24 RX ADMIN — Medication 40 MILLIGRAM(S): at 17:37

## 2022-06-24 RX ADMIN — TRAMADOL HYDROCHLORIDE 25 MILLIGRAM(S): 50 TABLET ORAL at 06:19

## 2022-06-24 RX ADMIN — TRAMADOL HYDROCHLORIDE 25 MILLIGRAM(S): 50 TABLET ORAL at 13:04

## 2022-06-24 RX ADMIN — LACTULOSE 20 GRAM(S): 10 SOLUTION ORAL at 12:14

## 2022-06-24 RX ADMIN — Medication 650 MILLIGRAM(S): at 12:15

## 2022-06-24 RX ADMIN — TRAMADOL HYDROCHLORIDE 25 MILLIGRAM(S): 50 TABLET ORAL at 22:10

## 2022-06-24 RX ADMIN — Medication 250 MILLIGRAM(S): at 00:22

## 2022-06-24 RX ADMIN — Medication 650 MILLIGRAM(S): at 17:37

## 2022-06-24 RX ADMIN — Medication 100 GRAM(S): at 02:21

## 2022-06-24 RX ADMIN — GABAPENTIN 100 MILLIGRAM(S): 400 CAPSULE ORAL at 17:38

## 2022-06-24 RX ADMIN — GABAPENTIN 100 MILLIGRAM(S): 400 CAPSULE ORAL at 05:51

## 2022-06-24 RX ADMIN — CHLORHEXIDINE GLUCONATE 1 APPLICATION(S): 213 SOLUTION TOPICAL at 05:51

## 2022-06-24 RX ADMIN — Medication 10 MILLIGRAM(S): at 12:14

## 2022-06-24 RX ADMIN — MYCOPHENOLATE MOFETIL 1000 MILLIGRAM(S): 250 CAPSULE ORAL at 20:20

## 2022-06-24 RX ADMIN — MYCOPHENOLATE MOFETIL 83.34 MILLIGRAM(S): 250 CAPSULE ORAL at 08:38

## 2022-06-24 RX ADMIN — HEPARIN SODIUM 5000 UNIT(S): 5000 INJECTION INTRAVENOUS; SUBCUTANEOUS at 13:04

## 2022-06-24 RX ADMIN — Medication 650 MILLIGRAM(S): at 05:49

## 2022-06-24 RX ADMIN — CEFEPIME 100 MILLIGRAM(S): 1 INJECTION, POWDER, FOR SOLUTION INTRAMUSCULAR; INTRAVENOUS at 05:49

## 2022-06-24 RX ADMIN — HEPARIN SODIUM 5000 UNIT(S): 5000 INJECTION INTRAVENOUS; SUBCUTANEOUS at 21:41

## 2022-06-24 RX ADMIN — MUPIROCIN 1 APPLICATION(S): 20 OINTMENT TOPICAL at 05:49

## 2022-06-24 RX ADMIN — TRAMADOL HYDROCHLORIDE 25 MILLIGRAM(S): 50 TABLET ORAL at 05:50

## 2022-06-24 NOTE — PROGRESS NOTE ADULT - PROBLEM SELECTOR PLAN 5
- Tmax 100.4 on 6/7, cultures NGTD  - appreciate transplant ID recs, fever possibly due to RUE occlusive thrombus seen in right cephalic and basilic veins as seen on [facility] duplex performed on 6/3, possible thrombophlebitis, was given Ancef 6/2-6/7   - RUE doppler 6/3 with no evidence of DVT, but did have an occlusive thrombus within the superficial veins (cephalic and basilic)  - BCx NGTD (6/7), RVP negative, UA negative

## 2022-06-24 NOTE — PROGRESS NOTE ADULT - SUBJECTIVE AND OBJECTIVE BOX
INFECTIOUS DISEASES FOLLOW UP-- Arabella Escalante  [phone]    This is a follow up note for this  66yMale with  Cardiomyopathy        ROS:  CONSTITUTIONAL:  No fever, good appetite  CARDIOVASCULAR:  No chest pain or palpitations  RESPIRATORY:  No dyspnea  GASTROINTESTINAL:  No nausea, vomiting, diarrhea, or abdominal pain  GENITOURINARY:  No dysuria  NEUROLOGIC:  No headache,     Allergies    No Known Allergies    Intolerances        ANTIBIOTICS/RELEVANT:  antimicrobials  vancomycin    Solution 125 milliGRAM(s) Oral every 12 hours    immunologic:  mycophenolate mofetil 1000 milliGRAM(s) Oral every 12 hours  tacrolimus 2 milliGRAM(s) Oral <User Schedule>    OTHER:  acetaminophen     Tablet .. 650 milliGRAM(s) Oral every 6 hours  chlorhexidine 2% Cloths 1 Application(s) Topical <User Schedule>  dextrose 50% Injectable 50 milliLiter(s) IV Push every 15 minutes  DOBUTamine Infusion 1 MICROgram(s)/kG/Min IV Continuous <Continuous>  furosemide   Injectable 40 milliGRAM(s) IV Push every 12 hours  gabapentin 100 milliGRAM(s) Oral two times a day  heparin   Injectable 5000 Unit(s) SubCutaneous every 8 hours  insulin regular Infusion 3 Unit(s)/Hr IV Continuous <Continuous>  methylPREDNISolone sodium succinate Injectable 36 milliGRAM(s) IV Push every 12 hours  milrinone Infusion 0.375 MICROgram(s)/kG/Min IV Continuous <Continuous>  mupirocin 2% Nasal 1 Application(s) Both Nostrils every 12 hours  pantoprazole  Injectable 40 milliGRAM(s) IV Push daily  polyethylene glycol 3350 17 Gram(s) Oral daily  sodium chloride 0.9%. 1000 milliLiter(s) IV Continuous <Continuous>  traMADol 25 milliGRAM(s) Oral every 8 hours  vasopressin Infusion 0.033 Unit(s)/Min IV Continuous <Continuous>      Objective:  Vital Signs Last 24 Hrs  T(C): 35.8 (24 Jun 2022 16:00), Max: 36.2 (24 Jun 2022 00:00)  T(F): 96.4 (24 Jun 2022 16:00), Max: 97.2 (24 Jun 2022 00:00)  HR: 98 (24 Jun 2022 17:00) (93 - 100)  BP: 129/70 (24 Jun 2022 10:00) (114/59 - 129/70)  BP(mean): 93 (24 Jun 2022 10:00) (80 - 93)  RR: 24 (24 Jun 2022 17:00) (9 - 41)  SpO2: 100% (24 Jun 2022 17:00) (97% - 100%)    PHYSICAL EXAM:  Constitutional:no acute distress  Eyes:JOHNY, EOMI  Ear/Nose/Throat: no oral lesions, 	  Respiratory: clear BL  Cardiovascular: S1S2  Gastrointestinal:soft, (+) BS, no tenderness  Extremities:no e/e/c  No Lymphadenopathy  IV sites not inflammed.    LABS:                        7.5    16.12 )-----------( 135      ( 24 Jun 2022 01:37 )             22.6     06-24    138  |  101  |  43<H>  ----------------------------<  121<H>  4.3   |  25  |  1.92<H>    Ca    9.0      24 Jun 2022 01:37  Phos  5.1     06-24  Mg     2.1     06-24    TPro  5.7<L>  /  Alb  3.8  /  TBili  0.8  /  DBili  x   /  AST  31  /  ALT  31  /  AlkPhos  46  06-24    PT/INR - ( 23 Jun 2022 00:52 )   PT: 14.4 sec;   INR: 1.25 ratio         PTT - ( 23 Jun 2022 00:52 )  PTT:28.4 sec      MICROBIOLOGY:            RECENT CULTURES:  06-21 @ 23:09  .Tissue Other  --  --  --    Testing in progress  --      RADIOLOGY & ADDITIONAL STUDIES:  < from: Xray Chest 1 View- PORTABLE-Routine (06.23.22 @ 02:56) >  IMPRESSION:  Mild congestive changes.    < end of copied text >   INFECTIOUS DISEASES FOLLOW UP-- Arabella Escalante  [phone]    This is a follow up note for this  66yMale with  Cardiomyopathy  s/p heart transplant        ROS:  CONSTITUTIONAL:  No fever, good appetite  CARDIOVASCULAR:  No chest pain or palpitations  RESPIRATORY:  No dyspnea on hi flow oxygen  GASTROINTESTINAL:  No nausea, vomiting, diarrhea, or abdominal pain  GENITOURINARY:  No dysuria  NEUROLOGIC:  No headache,     Allergies    No Known Allergies    Intolerances        ANTIBIOTICS/RELEVANT:  antimicrobials  vancomycin    Solution 125 milliGRAM(s) Oral every 12 hours    immunologic:  mycophenolate mofetil 1000 milliGRAM(s) Oral every 12 hours  tacrolimus 2 milliGRAM(s) Oral <User Schedule>    OTHER:  acetaminophen     Tablet .. 650 milliGRAM(s) Oral every 6 hours  chlorhexidine 2% Cloths 1 Application(s) Topical <User Schedule>  dextrose 50% Injectable 50 milliLiter(s) IV Push every 15 minutes  DOBUTamine Infusion 1 MICROgram(s)/kG/Min IV Continuous <Continuous>  furosemide   Injectable 40 milliGRAM(s) IV Push every 12 hours  gabapentin 100 milliGRAM(s) Oral two times a day  heparin   Injectable 5000 Unit(s) SubCutaneous every 8 hours  insulin regular Infusion 3 Unit(s)/Hr IV Continuous <Continuous>  methylPREDNISolone sodium succinate Injectable 36 milliGRAM(s) IV Push every 12 hours  milrinone Infusion 0.375 MICROgram(s)/kG/Min IV Continuous <Continuous>  mupirocin 2% Nasal 1 Application(s) Both Nostrils every 12 hours  pantoprazole  Injectable 40 milliGRAM(s) IV Push daily  polyethylene glycol 3350 17 Gram(s) Oral daily  sodium chloride 0.9%. 1000 milliLiter(s) IV Continuous <Continuous>  traMADol 25 milliGRAM(s) Oral every 8 hours  vasopressin Infusion 0.033 Unit(s)/Min IV Continuous <Continuous>      Objective:  Vital Signs Last 24 Hrs  T(C): 35.8 (24 Jun 2022 16:00), Max: 36.2 (24 Jun 2022 00:00)  T(F): 96.4 (24 Jun 2022 16:00), Max: 97.2 (24 Jun 2022 00:00)  HR: 98 (24 Jun 2022 17:00) (93 - 100)  BP: 129/70 (24 Jun 2022 10:00) (114/59 - 129/70)  BP(mean): 93 (24 Jun 2022 10:00) (80 - 93)  RR: 24 (24 Jun 2022 17:00) (9 - 41)  SpO2: 100% (24 Jun 2022 17:00) (97% - 100%)    PHYSICAL EXAM:  Constitutional:no acute distress  Eyes:JOHNY, EOMI  Ear/Nose/Throat: no oral lesions, hi flow oxygen	  Respiratory: clear BL  4 chest tubes in place  Cardiovascular: S1S2  sternotomy dressing CDI  Gastrointestinal:soft, (+) BS, no tenderness  Extremities:no e/e/c  No Lymphadenopathy  IV sites not inflammed.    LABS:                        7.5    16.12 )-----------( 135      ( 24 Jun 2022 01:37 )             22.6     06-24    138  |  101  |  43<H>  ----------------------------<  121<H>  4.3   |  25  |  1.92<H>    Ca    9.0      24 Jun 2022 01:37  Phos  5.1     06-24  Mg     2.1     06-24    TPro  5.7<L>  /  Alb  3.8  /  TBili  0.8  /  DBili  x   /  AST  31  /  ALT  31  /  AlkPhos  46  06-24    PT/INR - ( 23 Jun 2022 00:52 )   PT: 14.4 sec;   INR: 1.25 ratio         PTT - ( 23 Jun 2022 00:52 )  PTT:28.4 sec      MICROBIOLOGY:            RECENT CULTURES:  06-21 @ 23:09  .Tissue Other  --  --  --    Testing in progress  --      RADIOLOGY & ADDITIONAL STUDIES:  < from: Xray Chest 1 View- PORTABLE-Routine (06.23.22 @ 02:56) >  IMPRESSION:  Mild congestive changes.    < end of copied text >

## 2022-06-24 NOTE — PROGRESS NOTE ADULT - CRITICAL CARE ATTENDING COMMENT
making excellent progress. mobilizing to chair.  attempt to wean last parts of NO result in elevation in PASP to 60s.   s/p 1X PRBCs  meds:  1, primacor .375, NO 2ppm, cellcept 1g bid, solumedrol taper, prograf 2 PO bid (pending), abs being d/c lasix 40 IV bid, jerry, tramadol,   CVP 10, PA 59/18 35, PA sat 74 Afebrile   HR , 120/-130/   I/O: -573  06-24    138  |  101  |  43<H>  ----------------------------<  121<H>  4.3   |  25  |  1.92<H>  Cr 1.92, 1.27     Ca    9.0      24 Jun 2022 01:37  Phos  5.1     06-24  Mg     2.1     06-24  TPro  5.7<L>  /  Alb  3.8  /  TBili  0.8  /  DBili  x   /  AST  31  /  ALT  31  /  AlkPhos  46  06-24    Discussed on MDR.   Patient has reactive pulm pressures, notable on removal of last drops of NO   note rising Cr.   Plan;   continue diuretics PRN for I=O  adjust prograf slowly in view of renal function  hold steriod taper until renal function stabalizes.  can d/c ZEHRA.   Dany Dominique

## 2022-06-24 NOTE — PROGRESS NOTE ADULT - PROBLEM SELECTOR PLAN 4
- noted to have a bump in renal function today, creatinine currently 1.9   - diuresis as needed as stated above  - will slowly increase prograf to goal  - cardiorenal consulted, appreciate recommendations

## 2022-06-24 NOTE — PROGRESS NOTE ADULT - SUBJECTIVE AND OBJECTIVE BOX
Subjective: Patient seen and examined resting in bed.     Medications:  acetaminophen     Tablet .. 650 milliGRAM(s) Oral every 6 hours  cefepime   IVPB 1000 milliGRAM(s) IV Intermittent every 8 hours  chlorhexidine 2% Cloths 1 Application(s) Topical <User Schedule>  dextrose 50% Injectable 50 milliLiter(s) IV Push every 15 minutes  DOBUTamine Infusion 1 MICROgram(s)/kG/Min IV Continuous <Continuous>  furosemide   Injectable 40 milliGRAM(s) IV Push every 12 hours  gabapentin 100 milliGRAM(s) Oral two times a day  heparin   Injectable 5000 Unit(s) SubCutaneous every 8 hours  insulin regular Infusion 3 Unit(s)/Hr IV Continuous <Continuous>  methylPREDNISolone sodium succinate Injectable 36 milliGRAM(s) IV Push every 12 hours  milrinone Infusion 0.375 MICROgram(s)/kG/Min IV Continuous <Continuous>  mupirocin 2% Nasal 1 Application(s) Both Nostrils every 12 hours  mycophenolate mofetil IVPB 1000 milliGRAM(s) IV Intermittent <User Schedule>  pantoprazole  Injectable 40 milliGRAM(s) IV Push daily  polyethylene glycol 3350 17 Gram(s) Oral daily  sodium chloride 0.9%. 1000 milliLiter(s) IV Continuous <Continuous>  tacrolimus 2 milliGRAM(s) Oral <User Schedule>  traMADol 25 milliGRAM(s) Oral every 8 hours  vancomycin    Solution 125 milliGRAM(s) Oral every 12 hours  vancomycin  IVPB 750 milliGRAM(s) IV Intermittent every 12 hours  vasopressin Infusion 0.033 Unit(s)/Min IV Continuous <Continuous>      ICU Vital Signs Last 24 Hrs  T(C): 36.2 (2022 08:00), Max: 36.2 (2022 00:00)  T(F): 97.2 (2022 08:00), Max: 97.2 (2022 00:00)  HR: 96 (2022 07:00) (80 - 97)  BP: --  BP(mean): --  ABP: 137/61 (2022 07:00) (95/46 - 148/59)  ABP(mean): 84 (2022 07:00) (59 - 89)  RR: 23 (2022 07:00) (9 - 29)  SpO2: 100% (2022 07:00) (97% - 100%)      Weight in k.4 ( @ 00:00)    I&O's Summary    2022 07:01  -  2022 07:00  --------------------------------------------------------  IN: 2586.3 mL / OUT: 3290 mL / NET: -703.7 mL        Physical Exam  General: No distress. Comfortable.  Neck: JVP ~12 cm  Chest: Clear to auscultation bilaterally, +CT x 3  CV: Normal S1 and S2. No murmurs, rub, or gallops. Radial pulses normal.  Abdomen: Soft, non-distended, non-tender  Neurology: Alert and oriented times three.     Labs:                        7.5    16.12 )-----------( 135      ( 2022 01:37 )             22.6         138  |  101  |  43<H>  ----------------------------<  121<H>  4.3   |  25  |  1.92<H>    Ca    9.0      2022 01:37  Phos  5.1       Mg     2.1         TPro  5.7<L>  /  Alb  3.8  /  TBili  0.8  /  DBili  x   /  AST  31  /  ALT  31  /  AlkPhos  46      PT/INR - ( 2022 00:52 )   PT: 14.4 sec;   INR: 1.25 ratio         PTT - ( 2022 00:52 )  PTT:28.4 sec      Creatine Kinase, Serum: 1548 U/L (22 @ 20:45)  Creatine Kinase, Serum: 1548 U/L (22 @ 20:45)    Oxygen Saturation, Mixed: 74.0 ( @ 06:09)  Oxygen Saturation, Mixed: 77.0 ( @ 01:31)  Oxygen Saturation, Mixed: 76.5 ( @ 22:09)  Oxygen Saturation, Mixed: 68.4 ( @ 17:00)  Oxygen Saturation, Mixed: 73.0 ( @ 14:30)  Oxygen Saturation, Mixed: 61.4 ( @ 12:30)  Oxygen Saturation, Mixed: 75.4 ( @ 08:00)  Oxygen Saturation, Mixed: 75.1 ( @ 01:50)  Oxygen Saturation, Mixed: 76.5 ( @ 23:55)  Oxygen Saturation, Mixed: 79.0 ( @ 21:00)  Oxygen Saturation, Mixed: 77.5 ( @ 16:46)  Oxygen Saturation, Mixed: 66.0 ( @ 12:02)  Oxygen Saturation, Mixed: 75.1 ( @ 08:55)  Oxygen Saturation, Mixed: 63.7 ( @ 08:15)  Oxygen Saturation, Mixed: 67.3 ( @ 05:18)  Oxygen Saturation, Mixed: 71.3 ( @ 03:12)  Oxygen Saturation, Mixed: 78.1 ( @ 01:07)  Oxygen Saturation, Mixed: 77.0 ( @ 23:04)  Oxygen Saturation, Mixed: 87.9 ( @ 20:41)            Imaging Studies

## 2022-06-24 NOTE — PROGRESS NOTE ADULT - PROBLEM SELECTOR PLAN 1
- s/p OHT with Dr. Earl/Maria C on 6/21 (ischemic time 261 min)  - IABP removed on POD 1  - remains on  and Primacor, plan to wean off  today  - when Maricruz was weaned off was noted to have a rise in PA pressures, keep Maricruz @ 2ppm   - continue to diuresis as needed to make patient net even (I=Os)  - plan for RHC/EMBx Tuesday 6/28

## 2022-06-24 NOTE — CONSULT NOTE ADULT - PROBLEM SELECTOR RECOMMENDATION 9
Pt. with hemodynamically mediated JAGRUTI in setting of OHT and renal venous congestion.  Upon review of labs, Scr was 1.2 on 4/19/22 and was elevated at 1.8 on 5/24/22. SCr downtrended to 1.01 on 6/20/22. Pt underwent OHT on 6/21/22 and required IABP post operatively. Scr uptrended to 1.23 on 6/23/22 with elevated PA pressures. SCr increased to 1.97. Pt currently on VI diuretics and non oliguric. UA done on 6/8/22 showed trace blood and proteinuria. SFLC ratio was WNL on 6/2/22 and SPEP showed normal results. Pt also noted to have downtrending platelets and hemoglobin. Recommend check UA, urine TP/Cr ratio. Check LDH and haptoglobin. Continue IV diuretics, titrate to keep CVP < 10. Labs reviewed. Pt critically ill with BP maintained on IV vasopressors. No urgent indication for RRT. Monitor labs and urine output. Pt. with hemodynamically mediated JAGRUTI after OHT, in the setting of renal venous congestion.Upon review of labs, Scr was 1.2 on 4/19/22 and was elevated at 1.8 on 5/24/22. SCr downtrended to 1.01 on 6/20/22. Pt underwent OHT on 6/21/22 and required IABP post operatively. Scr uptrended to 1.23 on 6/23/22 with elevated PA pressures. SCr increased to 1.97. Pt currently on VI diuretics and non oliguric. UA done on 6/8/22 showed trace blood and proteinuria. SFLC ratio was WNL on 6/2/22 and SPEP showed normal results. Pt also noted to have downtrending platelets and hemoglobin. Recommend check UA, urine TP/Cr ratio. Check LDH and haptoglobin. Continue IV diuretics, titrate to keep CVP < 10. Labs reviewed. Pt critically ill with BP maintained on IV vasopressors. No urgent indication for RRT. Monitor labs and urine output.

## 2022-06-24 NOTE — PROGRESS NOTE ADULT - PROBLEM SELECTOR PLAN 7
- noted to have a drop in Hgb today, was transfused with 1uPRBC  - no signs of active bleeding  - continue to trend daily

## 2022-06-24 NOTE — PROGRESS NOTE ADULT - ASSESSMENT
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 5/20 with near syncope in setting of worsening HF symptoms and found to have 41 episodes of VT, many terminating with ATP. LHC did not reveal new obstructive CAD and he underwent EPS which did not reveal endocardial substrate amenable for ablation. RHC revealed severely depressed cardiac output and he was transferred to [facility] 5/26 for advanced therapies evaluation.    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 He improved significantly with sequential uptitration of inotropes and increased pacing rate, but on 6/6 he had worsening JAGRUTI. He is s/p RHC which revealed severely elevated filling pressures, severe cpcPH, and CI of 1.9 on milrinone 0.5. IABP was placed and his renal function/PAPs have improved. He is MCS dependent and was inadequately supported with high dose inotropes, so was listed UNOS status 2e for OHT, awaiting suitable donor. However, was made status 7 as he became febrile, last 6/7 T 38. He has been afebrile since and blood cultures from 6/7 with NGTD x 48 hours and his leukocytosis has not worsened. Discussed with transplant ID and since bld cx negative x48hrs he has been re-listed UNOS status 2e.     A suitable donor was identified and patient underwent OHT with Dr. Earl/Maria C on 6/21 (ischemic time 261 mins, CMV +/+, Toxo -/-). Patient was successfully extubated and IABP was removed on POD 1. When attempting to wean his Maricruz his PA pressures started to rise, at this time will keep on low dose Maricruz. His most recent labs show worsening renal function. Currently slowly up-titrating Prograf level and will keep steroid taper paused. Remains on  and Primacor. Plan to RHC/EMBx Tuesday 6/28.           Primary cardiologist: Ari Wagner MD    Review of studies  TTE 5/21: LV 5.2 cm, LVEF 10-15%, LVOT VTI 10 cm, moderate RV dysfunction, mild AI, minimal MR  EKG: a-BiV paced  LHC 5/23: patent mLAD stent with slow flow, D1 with 40-50% stenosis, mild disease otherwise  RHC 5/26: RA 12, PA 70/40 (50), PCWP 22, Milana CO/CI 2.3/1.3, MAP 83 with SVR 2469, PVR 12 PERSAUD    Hemodynamics  5/27 (milrinone 0.25): RA 10, PA 76/35 (49), PCWP 34, PA sat 57% with Milana CO/CI 3.1/1.6, MAP 71 with SVR 1574, PVR 4.8  5/28 (on milrinone 0.375): RA 7, PA 63/31, PCWP 26, PA sat 72.8% with Milana CO/CI 4.9/2.5 (CI later dropped to 1.6 in the afternoon), MAP 70 with SVR 1039, PVR 2.9  5/29 (on milrinone 0.5): RA 8, PA 75/32 (50), PCWP 28, PA sat 74% with Milana CO/CI 5.0/2.6, MAP 77 with SVR 1200, PVR 4.4  5/29 (on milrinone 0.5 and nipride to 3 mcg/kg/min): RA 6, PA 59/31 (40), PCWP 30, PA sat 79% with Milana CO/CI 7.0/3.6, BP 97/57 (MAP 70) with , PVR 1.4  6/6 RHC (mil 0.5): RA 11 (v13), RV 75/17, PA 80/36/52, PCWP 24 (v29), PA sat 59.5%, CO/CI (F) 3.58/1.88  6/7 (mil 0.5, IABP 1:1): CVP 5, PA 66/32/45, PA sat 65.7%, CO/CI (F) 4.0/2.1, SVR 2000  6/8 (mil 0.5, IABP 1:1): CVP 1, PA 46/19/28, PA sat 72.7%, CO/CI (F) 5.6/2.9, SVR 1257  6/8 PM (mil 0.5, IABP 1:1): CVP 4, PA 68/25/38, PA sat 68%, CO/CI (f) 5/2.6, SVR 1326

## 2022-06-24 NOTE — PROGRESS NOTE ADULT - SUBJECTIVE AND OBJECTIVE BOX
CRITICAL CARE ATTENDING - CTICU    MEDICATIONS  (STANDING):  acetaminophen     Tablet .. 650 milliGRAM(s) Oral every 6 hours  cefepime   IVPB 1000 milliGRAM(s) IV Intermittent every 8 hours  chlorhexidine 2% Cloths 1 Application(s) Topical <User Schedule>  dextrose 50% Injectable 50 milliLiter(s) IV Push every 15 minutes  DOBUTamine Infusion 1 MICROgram(s)/kG/Min (2.19 mL/Hr) IV Continuous <Continuous>  furosemide   Injectable 40 milliGRAM(s) IV Push every 12 hours  gabapentin 100 milliGRAM(s) Oral two times a day  heparin   Injectable 5000 Unit(s) SubCutaneous every 8 hours  insulin regular Infusion 3 Unit(s)/Hr (3 mL/Hr) IV Continuous <Continuous>  methylPREDNISolone sodium succinate Injectable 36 milliGRAM(s) IV Push every 12 hours  milrinone Infusion 0.375 MICROgram(s)/kG/Min (8.2 mL/Hr) IV Continuous <Continuous>  mupirocin 2% Nasal 1 Application(s) Both Nostrils every 12 hours  mycophenolate mofetil IVPB 1000 milliGRAM(s) IV Intermittent <User Schedule>  pantoprazole  Injectable 40 milliGRAM(s) IV Push daily  polyethylene glycol 3350 17 Gram(s) Oral daily  sodium chloride 0.9%. 1000 milliLiter(s) (10 mL/Hr) IV Continuous <Continuous>  tacrolimus 2 milliGRAM(s) Oral <User Schedule>  traMADol 25 milliGRAM(s) Oral every 8 hours  vancomycin    Solution 125 milliGRAM(s) Oral every 12 hours  vancomycin  IVPB 750 milliGRAM(s) IV Intermittent every 12 hours  vasopressin Infusion 0.033 Unit(s)/Min (2 mL/Hr) IV Continuous <Continuous>                                    7.5    16.12 )-----------( 135      ( 2022 01:37 )             22.6       06-24    138  |  101  |  43<H>  ----------------------------<  121<H>  4.3   |  25  |  1.92<H>    Ca    9.0      2022 01:37  Phos  5.1     06-24  Mg     2.1     06-24    TPro  5.7<L>  /  Alb  3.8  /  TBili  0.8  /  DBili  x   /  AST  31  /  ALT  31  /  AlkPhos  46        PT/INR - ( 2022 00:52 )   PT: 14.4 sec;   INR: 1.25 ratio         PTT - ( 2022 00:52 )  PTT:28.4 sec        Daily     Daily Weight in k.4 (2022 00:00)       @ 07:  -   @ 07:00  --------------------------------------------------------  IN: 2586.3 mL / OUT: 3290 mL / NET: -703.7 mL     @ 07:01  -   @ 08:41  --------------------------------------------------------  IN: 174.4 mL / OUT: 150 mL / NET: 24.4 mL        Critically Ill patient  : [ ] preoperative ,   [x] post operative    Requires :  [x] Arterial Line   [x] Central Line  [x ] PA catheter  [ ] IABP  [ ] ECMO  [ ] LVAD  [ ] Ventilator  [x] pacemaker- TPM [ ] Impella [x] HFO2/ Maricruz                       [x ] ABG's     [ x] Pulse Oxymetry Monitoring  Bedside evaluation , monitoring , treatment of hemodynamics , fluids , IVP/ IVCD meds.        Diagnosis:     POD 3- Heart Transplant  & AICD removal     Immunosuppressive therapy     Cardiogenic Shock - post op     CHF- acute [ x]   chronic [ ]    systolic [ x]   diatolic [x ]          - Echo- EF -  post op           [ ] RV dysfunction          - Cxr-cardiomegally, edema          - Clinical-  [ x]inotropes   [x ]pressors   [x ]diuresis   [ ]IABP   [ ]ECMO   [ ]LVAD   [ ] ventilator   [x] HFO2     Chest tube drainage     Requires chest PT, pulmonary toilet, suctioning to maintain SaO2,  patent airway and treat atelectasis.     IABP   [x ] removed on   and f/u vascular checks      Temporary pacemaker (TPM) interrogation and setting.     Hypoxemia - Requires [ ] BIPAP  [ x] HF O2     Maricruz- 2ppm     Hemodynamic lability,  instability. Requires IVCD [x] vasopressors [x] inotropes  [ ] vasodilator  [x] IVSS fluid  to maintain MAP, perfusion, C.I.     [place]af[place] catheter interpretation and therapeutic management of unstable hemodynamics     Hypotension     Fluid overload    DM2- IVCD insulin     Renal Failure - Acute Kidney Injury / CKD     Thrombocytopenia     Hypovolemia     Requires bedside physical therapy, mobilization and total detention care.           By signing my name below, I, Disha May, attest that this documentation has been prepared under the direction and in the presence of Julius Kinney MD.   Electronically Signed: Mini Love 06-24-22 @ 08:41      Discussed with CT surgeon, Physician Assistant - Nurse Practitioner- Critical care medicine team.   Discussed at  AM / PM rounds.   Chart, labs , films reviewed.    Cumulative Critical Care Time Given Today:  CRITICAL CARE ATTENDING - CTICU    MEDICATIONS  (STANDING):  acetaminophen     Tablet .. 650 milliGRAM(s) Oral every 6 hours  cefepime   IVPB 1000 milliGRAM(s) IV Intermittent every 8 hours  chlorhexidine 2% Cloths 1 Application(s) Topical <User Schedule>  dextrose 50% Injectable 50 milliLiter(s) IV Push every 15 minutes  DOBUTamine Infusion 1 MICROgram(s)/kG/Min (2.19 mL/Hr) IV Continuous <Continuous>  furosemide   Injectable 40 milliGRAM(s) IV Push every 12 hours  gabapentin 100 milliGRAM(s) Oral two times a day  heparin   Injectable 5000 Unit(s) SubCutaneous every 8 hours  insulin regular Infusion 3 Unit(s)/Hr (3 mL/Hr) IV Continuous <Continuous>  methylPREDNISolone sodium succinate Injectable 36 milliGRAM(s) IV Push every 12 hours  milrinone Infusion 0.375 MICROgram(s)/kG/Min (8.2 mL/Hr) IV Continuous <Continuous>  mupirocin 2% Nasal 1 Application(s) Both Nostrils every 12 hours  mycophenolate mofetil IVPB 1000 milliGRAM(s) IV Intermittent <User Schedule>  pantoprazole  Injectable 40 milliGRAM(s) IV Push daily  polyethylene glycol 3350 17 Gram(s) Oral daily  sodium chloride 0.9%. 1000 milliLiter(s) (10 mL/Hr) IV Continuous <Continuous>  tacrolimus 2 milliGRAM(s) Oral <User Schedule>  traMADol 25 milliGRAM(s) Oral every 8 hours  vancomycin    Solution 125 milliGRAM(s) Oral every 12 hours  vancomycin  IVPB 750 milliGRAM(s) IV Intermittent every 12 hours  vasopressin Infusion 0.033 Unit(s)/Min (2 mL/Hr) IV Continuous <Continuous>                                    7.5    16.12 )-----------( 135      ( 2022 01:37 )             22.6       06-24    138  |  101  |  43<H>  ----------------------------<  121<H>  4.3   |  25  |  1.92<H>    Ca    9.0      2022 01:37  Phos  5.1     06-24  Mg     2.1     06-24    TPro  5.7<L>  /  Alb  3.8  /  TBili  0.8  /  DBili  x   /  AST  31  /  ALT  31  /  AlkPhos  46        PT/INR - ( 2022 00:52 )   PT: 14.4 sec;   INR: 1.25 ratio         PTT - ( 2022 00:52 )  PTT:28.4 sec        Daily     Daily Weight in k.4 (2022 00:00)       @ 07:  -   @ 07:00  --------------------------------------------------------  IN: 2586.3 mL / OUT: 3290 mL / NET: -703.7 mL     @ 07:01  -   @ 08:41  --------------------------------------------------------  IN: 174.4 mL / OUT: 150 mL / NET: 24.4 mL        Critically Ill patient  : [ ] preoperative ,   [x] post operative    Requires :  [x] Arterial Line   [x] Central Line  [x ] PA catheter  [ ] IABP  [ ] ECMO  [ ] LVAD  [ ] Ventilator  [x] pacemaker- TPM [ ] Impella [x] HFO2/ Maricruz                       [x ] ABG's     [ x] Pulse Oxymetry Monitoring  Bedside evaluation , monitoring , treatment of hemodynamics , fluids , IVP/ IVCD meds.        Diagnosis:     POD 3- Heart Transplant  & AICD removal     Immunosuppressive therapy     Cardiogenic Shock - post op - resolving      CHF- acute [ x]   chronic [ ]    systolic [ x]   diatolic [x ]          - Echo- EF -  post op           [ ] RV dysfunction          - Cxr-cardiomegally, edema          - Clinical-  [ x]inotropes   [x ]pressors   [x ]diuresis   [ ]IABP   [ ]ECMO   [ ]LVAD   [ ] ventilator   [x] HFO2     Chest tube drainage     Requires chest PT, pulmonary toilet, suctioning to maintain SaO2,  patent airway and treat atelectasis.     IABP   [x ] removed on   and f/u vascular checks      Temporary pacemaker (TPM) interrogation and setting.     Hypoxemia - Requires  [ x] HF O2     Maricruz- 2ppm     Hemodynamic lability,  instability. Requires IVCD [x] vasopressors [x] inotropes  [ ] vasodilator  [x] IVSS fluid  to maintain MAP, perfusion, C.I.     [place] Serafin catheter interpretation and therapeutic management of unstable hemodynamics     Hypotension     Fluid overload    DM2- IVCD insulin     Renal Failure - Acute Kidney Injury / CKD     Thrombocytopenia     Hypovolemia     Requires bedside physical therapy, mobilization and total MCC care.           By signing my name below, I, Disha May, attest that this documentation has been prepared under the direction and in the presence of Julius Kinney MD.   Electronically Signed: Mini Love 06-24-22 @ 08:41    I, Julius Kinney, personally performed the services described in this documentation. All medical record entries made by the scribe were at my direction and in my presence. I have reviewed the chart and agree that the record reflects my personal performance and is accurate and complete.   Julius Kinney MD.       Discussed with CT surgeon, Physician Assistant - Nurse Practitioner- Critical care medicine team.   Discussed at  AM / PM rounds.   Chart, labs , films reviewed.    Cumulative Critical Care Time Given Today:  90 min

## 2022-06-24 NOTE — PROGRESS NOTE ADULT - ASSESSMENT
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 5/20 with near syncope in setting of worsening HF symptoms and found to have 41 episodes of VT, many terminating with ATP. LHC did not reveal new obstructive CAD and he underwent EPS which did not reveal endocardial substrate amenable for ablation. RHC revealed severely depressed cardiac output and he was transferred to [facility] 5/26 for advanced therapies evaluation.    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 He improved significantly with sequential uptitration of inotropes and increased pacing rate, but on 6/6 he had worsening JAGRUTI. He is s/p RHC which revealed severely elevated filling pressures, severe cpcPH, and CI of 1.9 on milrinone 0.5. IABP was placed and his renal function/PAPs have improved. He is MCS dependent and was inadequately supported with high dose inotropes, so was listed UNOS status 2e for OHT, awaiting suitable donor. However, was made status 7 as he became febrile, last 6/7 T 38. He has been afebrile since and blood cultures from 6/7 with NGTD x 48 hours and his leukocytosis has not worsened. Discussed with transplant ID and since bld cx negative x48hrs he has been re-listed UNOS status 2e.     A suitable donor was identified and patient underwent OHT with Dr. Earl/Maria C on 6/21 (ischemic time 261 mins, CMV +/+, Toxo -/-). On POD 1 IABP was removed. Extubated afternoon 6/22    Cori-op antibiotics with  Vancomycin plus Cefepime completed today  oral Vanco 125 bid x 2 additional days per protocol    CMV:  intermediate risk  will eventually need valcyte once electrolytes and renal function stabilizes    toxo  low risk    PJP  will eventually need Bactrim or Mepron    thrush:  nystatin    Reggie Escalante MD  Can be called via Teams  After 5pm/weekends [phone]

## 2022-06-24 NOTE — CONSULT NOTE ADULT - SUBJECTIVE AND OBJECTIVE BOX
[facility] DIVISION OF KIDNEY DISEASES AND HYPERTENSION -- [phone]  -- INITIAL CONSULT NOTE  --------------------------------------------------------------------------------  HPI:  66-year-old male with PMHx HTN, HLD, type 2 DM, chronic HFrEF,  (EF 25-30% by Echo) underwent OHT on 6/21/22. Nephrology consulted for JAGRUTI.     Upon review of labs, Scr was 1.2 on 4/19/22 and was elevated at 1.8 on 5/24/22. SCr downtrended to 1.01 on 6/20/22. Pt underwent OHT on 6/21/22 and required IABP post operatively. Scr uptrended to 1.23 on 6/23/22 with elevated PA pressures. SCr increased to 1.97. Pt currently on VI diuretics and non oliguric. UA done on 6/8/22 showed trace blood and proteinuria. SFLC ratio was WNL on 6/2/22 and SPEP showed normal results. Pt says he was evaluated by nephrologist at [facility] prior to admission. Pt also noted to have downtrending platelets and hemoglobin.    Pt. seen and examined in CTU, sitting comfortably in chair.     PAST HISTORY  --------------------------------------------------------------------------------  PAST MEDICAL & SURGICAL HISTORY:  AV block  Essential hypertension  Chronic HFrEF (heart failure with reduced ejection fraction)  Chronic kidney disease, unspecified CKD stage  CAD (coronary artery disease)  HLD (hyperlipidemia)  Type diabetes mellitus  Artificial cardiac pacemaker    FAMILY HISTORY:  Family history of acute myocardial infarction (Father)  72      PAST SOCIAL HISTORY:    ALLERGIES & MEDICATIONS  --------------------------------------------------------------------------------  Allergies    No Known Allergies    Intolerances      Standing Inpatient Medications  acetaminophen     Tablet .. 650 milliGRAM(s) Oral every 6 hours  chlorhexidine 2% Cloths 1 Application(s) Topical <User Schedule>  dextrose 50% Injectable 50 milliLiter(s) IV Push every 15 minutes  DOBUTamine Infusion 1 MICROgram(s)/kG/Min IV Continuous <Continuous>  furosemide   Injectable 40 milliGRAM(s) IV Push every 12 hours  gabapentin 100 milliGRAM(s) Oral two times a day  heparin   Injectable 5000 Unit(s) SubCutaneous every 8 hours  insulin regular Infusion 3 Unit(s)/Hr IV Continuous <Continuous>  methylPREDNISolone sodium succinate Injectable 36 milliGRAM(s) IV Push every 12 hours  milrinone Infusion 0.375 MICROgram(s)/kG/Min IV Continuous <Continuous>  mupirocin 2% Nasal 1 Application(s) Both Nostrils every 12 hours  mycophenolate mofetil 1000 milliGRAM(s) Oral every 12 hours  pantoprazole  Injectable 40 milliGRAM(s) IV Push daily  polyethylene glycol 3350 17 Gram(s) Oral daily  sodium chloride 0.9%. 1000 milliLiter(s) IV Continuous <Continuous>  tacrolimus 2 milliGRAM(s) Oral <User Schedule>  traMADol 25 milliGRAM(s) Oral every 8 hours  vancomycin    Solution 125 milliGRAM(s) Oral every 12 hours  vasopressin Infusion 0.033 Unit(s)/Min IV Continuous <Continuous>    PRN Inpatient Medications      REVIEW OF SYSTEMS  --------------------------------------------------------------------------------  Gen: No fevers/chills  Head/Eyes/Ears: Normal hearing,   Respiratory: No dyspnea, cough  CV: No chest pain, as per HPI  GI: No abdominal pain, diarrhea  : No dysuria, hematuria  MSK: LE edema  Skin: No rashes  Heme: as per HPI    All other systems were reviewed and are negative, except as noted.    VITALS/PHYSICAL EXAM  --------------------------------------------------------------------------------  T(C): 36.2 (06-24-22 @ 12:00), Max: 36.2 (06-24-22 @ 00:00)  HR: 96 (06-24-22 @ 14:29) (93 - 100)  BP: 129/70 (06-24-22 @ 10:00) (114/59 - 129/70)  RR: 20 (06-24-22 @ 14:29) (9 - 41)  SpO2: 100% (06-24-22 @ 14:29) (97% - 100%)  Wt(kg): --      06-23-22 @ 07:01  -  06-24-22 @ 07:00  --------------------------------------------------------  IN: 2586.3 mL / OUT: 3290 mL / NET: -703.7 mL    06-24-22 @ 07:01  -  06-24-22 @ 15:00  --------------------------------------------------------  IN: 725.8 mL / OUT: 695 mL / NET: 30.8 mL      Physical Exam:  	Gen: NAD  	HEENT: Anicteric  	Pulm: CTA B/L  	CV: S1S2+, midline sx scar, dressing +  	Abd: Soft, +BS    	Ext: LE edema B/L++  	Neuro: Awake              : Hernandez+ with clear urine in the bag  	Skin: Warm and dry    LABS/STUDIES  --------------------------------------------------------------------------------              7.5    16.12 >-----------<  135      [06-24-22 @ 01:37]              22.6     138  |  101  |  43  ----------------------------<  121      [06-24-22 @ 01:37]  4.3   |  25  |  1.92        Ca     9.0     [06-24-22 @ 01:37]      Mg     2.1     [06-24-22 @ 01:37]      Phos  5.1     [06-24-22 @ 01:37]    Creatinine Trend:  SCr 1.92 [06-24 @ 01:37]  SCr 1.27 [06-23 @ 00:52]  SCr 1.10 [06-22 @ 01:10]  SCr 1.05 [06-21 @ 20:45]  SCr 1.18 [06-21 @ 01:15]    Urinalysis - [06-08-22 @ 00:26]      Color Light Yellow / Appearance Clear / SG 1.014 / pH 6.0      Gluc Negative / Ketone Negative  / Bili Negative / Urobili Negative       Blood Trace / Protein Trace / Leuk Est Negative / Nitrite Negative      RBC 1 / WBC 0 / Hyaline 0 / Gran  / Sq Epi  / Non Sq Epi 0 / Bacteria Negative      HBsAb Nonreact      [05-27-22 @ 17:23]  HBsAg Nonreact      [05-27-22 @ 17:23]  HBcAb Nonreact      [05-27-22 @ 17:23]  HCV 0.04, Nonreact      [04-19-22 @ 07:30]  HIV Nonreact      [05-27-22 @ 15:19]    MELIZA: titer 1:320, pattern Homogeneous      [05-27-22 @ 17:18]  Rheumatoid Factor 13      [05-27-22 @ 17:02]  Syphilis Screen (Treponema Pallidum Ab) Negative      [06-02-22 @ 21:12]  Free Light Chains: kappa 3.64, lambda 2.71, ratio = 1.34      [05-27 @ 17:20]  Immunofixation Serum: No Monoclonal Band Identified    Reference Range: None Detected      [05-27-22 @ 17:20]  SPEP Interpretation: Normal Electrophoresis Pattern      [05-27-22 @ 17:20]  Immunofixation Urine:   Reference Range: None Detected      [06-05-22 @ 12:15]  UPEP Interpretation: Normal Electrophoresis Pattern      [06-05-22 @ 12:15]    TacrolimusTacrolimus (), Serum: 4.6 ng/mL (06-24 @ 07:13)  Tacrolimus (), Serum: 1.5 ng/mL (06-23 @ 06:46)

## 2022-06-24 NOTE — PROGRESS NOTE ADULT - PROBLEM SELECTOR PLAN 3
- CMV +/+, will be started on Valcyte when feasible   - Toxo -/-, no ppx needed, will start bactrim for PJP ppx when appropriate  - completed course of periop abx on 6/24

## 2022-06-25 LAB
ALBUMIN SERPL ELPH-MCNC: 3.4 G/DL — SIGNIFICANT CHANGE UP (ref 3.3–5)
ALP SERPL-CCNC: 50 U/L — SIGNIFICANT CHANGE UP (ref 40–120)
ALT FLD-CCNC: 30 U/L — SIGNIFICANT CHANGE UP (ref 10–45)
ANION GAP SERPL CALC-SCNC: 10 MMOL/L — SIGNIFICANT CHANGE UP (ref 5–17)
AST SERPL-CCNC: 23 U/L — SIGNIFICANT CHANGE UP (ref 10–40)
BASE EXCESS BLDMV CALC-SCNC: -0.2 MMOL/L — SIGNIFICANT CHANGE UP (ref -3–3)
BASE EXCESS BLDMV CALC-SCNC: 0.7 MMOL/L — SIGNIFICANT CHANGE UP (ref -3–3)
BASE EXCESS BLDMV CALC-SCNC: 2.2 MMOL/L — SIGNIFICANT CHANGE UP (ref -3–3)
BASOPHILS # BLD AUTO: 0.01 K/UL — SIGNIFICANT CHANGE UP (ref 0–0.2)
BASOPHILS NFR BLD AUTO: 0.1 % — SIGNIFICANT CHANGE UP (ref 0–2)
BILIRUB SERPL-MCNC: 0.7 MG/DL — SIGNIFICANT CHANGE UP (ref 0.2–1.2)
BLD GP AB SCN SERPL QL: NEGATIVE — SIGNIFICANT CHANGE UP
BUN SERPL-MCNC: 51 MG/DL — HIGH (ref 7–23)
CALCIUM SERPL-MCNC: 8.7 MG/DL — SIGNIFICANT CHANGE UP (ref 8.4–10.5)
CHLORIDE SERPL-SCNC: 99 MMOL/L — SIGNIFICANT CHANGE UP (ref 96–108)
CO2 BLDMV-SCNC: 27 MMOL/L — SIGNIFICANT CHANGE UP (ref 21–29)
CO2 BLDMV-SCNC: 27 MMOL/L — SIGNIFICANT CHANGE UP (ref 21–29)
CO2 BLDMV-SCNC: 29 MMOL/L — SIGNIFICANT CHANGE UP (ref 21–29)
CO2 SERPL-SCNC: 25 MMOL/L — SIGNIFICANT CHANGE UP (ref 22–31)
CREAT SERPL-MCNC: 1.84 MG/DL — HIGH (ref 0.5–1.3)
EGFR: 40 ML/MIN/1.73M2 — LOW
EOSINOPHIL # BLD AUTO: 0 K/UL — SIGNIFICANT CHANGE UP (ref 0–0.5)
EOSINOPHIL NFR BLD AUTO: 0 % — SIGNIFICANT CHANGE UP (ref 0–6)
GAS PNL BLDA: SIGNIFICANT CHANGE UP
GAS PNL BLDMV: SIGNIFICANT CHANGE UP
GLUCOSE BLDC GLUCOMTR-MCNC: 102 MG/DL — HIGH (ref 70–99)
GLUCOSE BLDC GLUCOMTR-MCNC: 107 MG/DL — HIGH (ref 70–99)
GLUCOSE BLDC GLUCOMTR-MCNC: 119 MG/DL — HIGH (ref 70–99)
GLUCOSE BLDC GLUCOMTR-MCNC: 124 MG/DL — HIGH (ref 70–99)
GLUCOSE BLDC GLUCOMTR-MCNC: 126 MG/DL — HIGH (ref 70–99)
GLUCOSE BLDC GLUCOMTR-MCNC: 126 MG/DL — HIGH (ref 70–99)
GLUCOSE BLDC GLUCOMTR-MCNC: 127 MG/DL — HIGH (ref 70–99)
GLUCOSE BLDC GLUCOMTR-MCNC: 127 MG/DL — HIGH (ref 70–99)
GLUCOSE BLDC GLUCOMTR-MCNC: 128 MG/DL — HIGH (ref 70–99)
GLUCOSE BLDC GLUCOMTR-MCNC: 129 MG/DL — HIGH (ref 70–99)
GLUCOSE BLDC GLUCOMTR-MCNC: 129 MG/DL — HIGH (ref 70–99)
GLUCOSE BLDC GLUCOMTR-MCNC: 136 MG/DL — HIGH (ref 70–99)
GLUCOSE BLDC GLUCOMTR-MCNC: 137 MG/DL — HIGH (ref 70–99)
GLUCOSE BLDC GLUCOMTR-MCNC: 152 MG/DL — HIGH (ref 70–99)
GLUCOSE BLDC GLUCOMTR-MCNC: 154 MG/DL — HIGH (ref 70–99)
GLUCOSE BLDC GLUCOMTR-MCNC: 154 MG/DL — HIGH (ref 70–99)
GLUCOSE BLDC GLUCOMTR-MCNC: 164 MG/DL — HIGH (ref 70–99)
GLUCOSE BLDC GLUCOMTR-MCNC: 167 MG/DL — HIGH (ref 70–99)
GLUCOSE BLDC GLUCOMTR-MCNC: 170 MG/DL — HIGH (ref 70–99)
GLUCOSE BLDC GLUCOMTR-MCNC: 175 MG/DL — HIGH (ref 70–99)
GLUCOSE BLDC GLUCOMTR-MCNC: 184 MG/DL — HIGH (ref 70–99)
GLUCOSE BLDC GLUCOMTR-MCNC: 186 MG/DL — HIGH (ref 70–99)
GLUCOSE SERPL-MCNC: 114 MG/DL — HIGH (ref 70–99)
HCO3 BLDMV-SCNC: 25 MMOL/L — SIGNIFICANT CHANGE UP (ref 20–28)
HCO3 BLDMV-SCNC: 25 MMOL/L — SIGNIFICANT CHANGE UP (ref 20–28)
HCO3 BLDMV-SCNC: 28 MMOL/L — SIGNIFICANT CHANGE UP (ref 20–28)
HCT VFR BLD CALC: 24.5 % — LOW (ref 39–50)
HGB BLD-MCNC: 8.1 G/DL — LOW (ref 13–17)
HOROWITZ INDEX BLDMV+IHG-RTO: 40 — SIGNIFICANT CHANGE UP
IMM GRANULOCYTES NFR BLD AUTO: 1.2 % — SIGNIFICANT CHANGE UP (ref 0–1.5)
LYMPHOCYTES # BLD AUTO: 1.15 K/UL — SIGNIFICANT CHANGE UP (ref 1–3.3)
LYMPHOCYTES # BLD AUTO: 8.7 % — LOW (ref 13–44)
MAGNESIUM SERPL-MCNC: 2.1 MG/DL — SIGNIFICANT CHANGE UP (ref 1.6–2.6)
MCHC RBC-ENTMCNC: 29.8 PG — SIGNIFICANT CHANGE UP (ref 27–34)
MCHC RBC-ENTMCNC: 33.1 GM/DL — SIGNIFICANT CHANGE UP (ref 32–36)
MCV RBC AUTO: 90.1 FL — SIGNIFICANT CHANGE UP (ref 80–100)
MONOCYTES # BLD AUTO: 1.05 K/UL — HIGH (ref 0–0.9)
MONOCYTES NFR BLD AUTO: 8 % — SIGNIFICANT CHANGE UP (ref 2–14)
NEUTROPHILS # BLD AUTO: 10.8 K/UL — HIGH (ref 1.8–7.4)
NEUTROPHILS NFR BLD AUTO: 82 % — HIGH (ref 43–77)
NRBC # BLD: 0 /100 WBCS — SIGNIFICANT CHANGE UP (ref 0–0)
O2 CT VFR BLD CALC: 41 MMHG — SIGNIFICANT CHANGE UP (ref 30–65)
O2 CT VFR BLD CALC: 46 MMHG — SIGNIFICANT CHANGE UP (ref 30–65)
O2 CT VFR BLD CALC: 50 MMHG — SIGNIFICANT CHANGE UP (ref 30–65)
PCO2 BLDMV: 40 MMHG — SIGNIFICANT CHANGE UP (ref 30–65)
PCO2 BLDMV: 44 MMHG — SIGNIFICANT CHANGE UP (ref 30–65)
PCO2 BLDMV: 46 MMHG — SIGNIFICANT CHANGE UP (ref 30–65)
PH BLDMV: 7.37 — SIGNIFICANT CHANGE UP (ref 7.32–7.45)
PH BLDMV: 7.39 — SIGNIFICANT CHANGE UP (ref 7.32–7.45)
PH BLDMV: 7.41 — SIGNIFICANT CHANGE UP (ref 7.32–7.45)
PHOSPHATE SERPL-MCNC: 3.8 MG/DL — SIGNIFICANT CHANGE UP (ref 2.5–4.5)
PLATELET # BLD AUTO: 155 K/UL — SIGNIFICANT CHANGE UP (ref 150–400)
POTASSIUM SERPL-MCNC: 4.4 MMOL/L — SIGNIFICANT CHANGE UP (ref 3.5–5.3)
POTASSIUM SERPL-SCNC: 4.4 MMOL/L — SIGNIFICANT CHANGE UP (ref 3.5–5.3)
PROT SERPL-MCNC: 5.4 G/DL — LOW (ref 6–8.3)
RBC # BLD: 2.72 M/UL — LOW (ref 4.2–5.8)
RBC # FLD: 16.9 % — HIGH (ref 10.3–14.5)
RH IG SCN BLD-IMP: POSITIVE — SIGNIFICANT CHANGE UP
SAO2 % BLDMV: 67.7 — SIGNIFICANT CHANGE UP (ref 60–90)
SAO2 % BLDMV: 75.5 — SIGNIFICANT CHANGE UP (ref 60–90)
SAO2 % BLDMV: 80.9 — SIGNIFICANT CHANGE UP (ref 60–90)
SODIUM SERPL-SCNC: 134 MMOL/L — LOW (ref 135–145)
TACROLIMUS SERPL-MCNC: 4.5 NG/ML — SIGNIFICANT CHANGE UP
WBC # BLD: 13.17 K/UL — HIGH (ref 3.8–10.5)
WBC # FLD AUTO: 13.17 K/UL — HIGH (ref 3.8–10.5)

## 2022-06-25 PROCEDURE — 99233 SBSQ HOSP IP/OBS HIGH 50: CPT

## 2022-06-25 PROCEDURE — 99291 CRITICAL CARE FIRST HOUR: CPT

## 2022-06-25 PROCEDURE — 71045 X-RAY EXAM CHEST 1 VIEW: CPT | Mod: 26

## 2022-06-25 PROCEDURE — 99292 CRITICAL CARE ADDL 30 MIN: CPT

## 2022-06-25 PROCEDURE — 99233 SBSQ HOSP IP/OBS HIGH 50: CPT | Mod: GC

## 2022-06-25 RX ORDER — FUROSEMIDE 40 MG
40 TABLET ORAL ONCE
Refills: 0 | Status: COMPLETED | OUTPATIENT
Start: 2022-06-25 | End: 2022-06-25

## 2022-06-25 RX ORDER — VANCOMYCIN HCL 1 G
750 VIAL (EA) INTRAVENOUS EVERY 12 HOURS
Refills: 0 | Status: DISCONTINUED | OUTPATIENT
Start: 2022-06-25 | End: 2022-06-27

## 2022-06-25 RX ORDER — TACROLIMUS 5 MG/1
3 CAPSULE ORAL
Refills: 0 | Status: DISCONTINUED | OUTPATIENT
Start: 2022-06-26 | End: 2022-06-26

## 2022-06-25 RX ORDER — HYDRALAZINE HCL 50 MG
10 TABLET ORAL EVERY 8 HOURS
Refills: 0 | Status: DISCONTINUED | OUTPATIENT
Start: 2022-06-25 | End: 2022-06-28

## 2022-06-25 RX ORDER — CEFEPIME 1 G/1
1000 INJECTION, POWDER, FOR SOLUTION INTRAMUSCULAR; INTRAVENOUS EVERY 8 HOURS
Refills: 0 | Status: COMPLETED | OUTPATIENT
Start: 2022-06-25 | End: 2022-07-02

## 2022-06-25 RX ORDER — HYDRALAZINE HCL 50 MG
5 TABLET ORAL ONCE
Refills: 0 | Status: COMPLETED | OUTPATIENT
Start: 2022-06-25 | End: 2022-06-25

## 2022-06-25 RX ORDER — CEFAZOLIN SODIUM 1 G
2000 VIAL (EA) INJECTION EVERY 8 HOURS
Refills: 0 | Status: DISCONTINUED | OUTPATIENT
Start: 2022-06-25 | End: 2022-06-25

## 2022-06-25 RX ORDER — FUROSEMIDE 40 MG
40 TABLET ORAL DAILY
Refills: 0 | Status: DISCONTINUED | OUTPATIENT
Start: 2022-06-25 | End: 2022-06-26

## 2022-06-25 RX ORDER — CEFEPIME 1 G/1
1000 INJECTION, POWDER, FOR SOLUTION INTRAMUSCULAR; INTRAVENOUS EVERY 8 HOURS
Refills: 0 | Status: DISCONTINUED | OUTPATIENT
Start: 2022-06-25 | End: 2022-06-25

## 2022-06-25 RX ORDER — ALBUMIN HUMAN 25 %
50 VIAL (ML) INTRAVENOUS
Refills: 0 | Status: COMPLETED | OUTPATIENT
Start: 2022-06-25 | End: 2022-06-25

## 2022-06-25 RX ADMIN — MUPIROCIN 1 APPLICATION(S): 20 OINTMENT TOPICAL at 05:38

## 2022-06-25 RX ADMIN — Medication 50 MILLILITER(S): at 12:05

## 2022-06-25 RX ADMIN — Medication 650 MILLIGRAM(S): at 01:00

## 2022-06-25 RX ADMIN — HEPARIN SODIUM 5000 UNIT(S): 5000 INJECTION INTRAVENOUS; SUBCUTANEOUS at 14:03

## 2022-06-25 RX ADMIN — Medication 650 MILLIGRAM(S): at 06:00

## 2022-06-25 RX ADMIN — MYCOPHENOLATE MOFETIL 1000 MILLIGRAM(S): 250 CAPSULE ORAL at 20:05

## 2022-06-25 RX ADMIN — TRAMADOL HYDROCHLORIDE 25 MILLIGRAM(S): 50 TABLET ORAL at 14:04

## 2022-06-25 RX ADMIN — Medication 650 MILLIGRAM(S): at 05:37

## 2022-06-25 RX ADMIN — INSULIN HUMAN 3 UNIT(S)/HR: 100 INJECTION, SOLUTION SUBCUTANEOUS at 19:59

## 2022-06-25 RX ADMIN — Medication 100 MILLIGRAM(S): at 11:19

## 2022-06-25 RX ADMIN — Medication 10 MILLIGRAM(S): at 22:07

## 2022-06-25 RX ADMIN — TACROLIMUS 2 MILLIGRAM(S): 5 CAPSULE ORAL at 07:52

## 2022-06-25 RX ADMIN — Medication 650 MILLIGRAM(S): at 12:00

## 2022-06-25 RX ADMIN — Medication 5 MILLIGRAM(S): at 23:27

## 2022-06-25 RX ADMIN — HEPARIN SODIUM 5000 UNIT(S): 5000 INJECTION INTRAVENOUS; SUBCUTANEOUS at 05:38

## 2022-06-25 RX ADMIN — HEPARIN SODIUM 5000 UNIT(S): 5000 INJECTION INTRAVENOUS; SUBCUTANEOUS at 22:07

## 2022-06-25 RX ADMIN — Medication 50 MILLILITER(S): at 13:36

## 2022-06-25 RX ADMIN — MILRINONE LACTATE 8.2 MICROGRAM(S)/KG/MIN: 1 INJECTION, SOLUTION INTRAVENOUS at 20:01

## 2022-06-25 RX ADMIN — Medication 36 MILLIGRAM(S): at 20:06

## 2022-06-25 RX ADMIN — Medication 50 MILLILITER(S): at 11:23

## 2022-06-25 RX ADMIN — Medication 650 MILLIGRAM(S): at 00:28

## 2022-06-25 RX ADMIN — GABAPENTIN 100 MILLIGRAM(S): 400 CAPSULE ORAL at 17:33

## 2022-06-25 RX ADMIN — TRAMADOL HYDROCHLORIDE 25 MILLIGRAM(S): 50 TABLET ORAL at 22:06

## 2022-06-25 RX ADMIN — Medication 650 MILLIGRAM(S): at 11:35

## 2022-06-25 RX ADMIN — MYCOPHENOLATE MOFETIL 1000 MILLIGRAM(S): 250 CAPSULE ORAL at 07:52

## 2022-06-25 RX ADMIN — CEFEPIME 100 MILLIGRAM(S): 1 INJECTION, POWDER, FOR SOLUTION INTRAMUSCULAR; INTRAVENOUS at 22:06

## 2022-06-25 RX ADMIN — TRAMADOL HYDROCHLORIDE 25 MILLIGRAM(S): 50 TABLET ORAL at 22:30

## 2022-06-25 RX ADMIN — Medication 250 MILLIGRAM(S): at 11:47

## 2022-06-25 RX ADMIN — CHLORHEXIDINE GLUCONATE 1 APPLICATION(S): 213 SOLUTION TOPICAL at 05:24

## 2022-06-25 RX ADMIN — INSULIN HUMAN 3 UNIT(S)/HR: 100 INJECTION, SOLUTION SUBCUTANEOUS at 17:32

## 2022-06-25 RX ADMIN — Medication 650 MILLIGRAM(S): at 17:50

## 2022-06-25 RX ADMIN — TRAMADOL HYDROCHLORIDE 25 MILLIGRAM(S): 50 TABLET ORAL at 05:52

## 2022-06-25 RX ADMIN — CEFEPIME 100 MILLIGRAM(S): 1 INJECTION, POWDER, FOR SOLUTION INTRAMUSCULAR; INTRAVENOUS at 13:33

## 2022-06-25 RX ADMIN — GABAPENTIN 100 MILLIGRAM(S): 400 CAPSULE ORAL at 05:37

## 2022-06-25 RX ADMIN — Medication 10 MILLIGRAM(S): at 11:18

## 2022-06-25 RX ADMIN — SODIUM CHLORIDE 10 MILLILITER(S): 9 INJECTION INTRAMUSCULAR; INTRAVENOUS; SUBCUTANEOUS at 20:00

## 2022-06-25 RX ADMIN — Medication 36 MILLIGRAM(S): at 07:53

## 2022-06-25 RX ADMIN — Medication 40 MILLIGRAM(S): at 15:11

## 2022-06-25 RX ADMIN — PANTOPRAZOLE SODIUM 40 MILLIGRAM(S): 20 TABLET, DELAYED RELEASE ORAL at 11:34

## 2022-06-25 RX ADMIN — TRAMADOL HYDROCHLORIDE 25 MILLIGRAM(S): 50 TABLET ORAL at 05:38

## 2022-06-25 RX ADMIN — Medication 125 MILLIGRAM(S): at 20:07

## 2022-06-25 RX ADMIN — TRAMADOL HYDROCHLORIDE 25 MILLIGRAM(S): 50 TABLET ORAL at 14:39

## 2022-06-25 RX ADMIN — Medication 650 MILLIGRAM(S): at 17:33

## 2022-06-25 RX ADMIN — TACROLIMUS 2 MILLIGRAM(S): 5 CAPSULE ORAL at 20:06

## 2022-06-25 RX ADMIN — MILRINONE LACTATE 8.2 MICROGRAM(S)/KG/MIN: 1 INJECTION, SOLUTION INTRAVENOUS at 17:33

## 2022-06-25 RX ADMIN — Medication 40 MILLIGRAM(S): at 03:30

## 2022-06-25 RX ADMIN — Medication 50 MILLILITER(S): at 15:10

## 2022-06-25 RX ADMIN — MUPIROCIN 1 APPLICATION(S): 20 OINTMENT TOPICAL at 17:33

## 2022-06-25 RX ADMIN — Medication 125 MILLIGRAM(S): at 07:52

## 2022-06-25 NOTE — PROGRESS NOTE ADULT - SUBJECTIVE AND OBJECTIVE BOX
CRITICAL CARE ATTENDING - CTICU    MEDICATIONS  (STANDING):  acetaminophen     Tablet .. 650 milliGRAM(s) Oral every 6 hours  chlorhexidine 2% Cloths 1 Application(s) Topical <User Schedule>  dextrose 50% Injectable 50 milliLiter(s) IV Push every 15 minutes  furosemide   Injectable 40 milliGRAM(s) IV Push every 12 hours  gabapentin 100 milliGRAM(s) Oral two times a day  heparin   Injectable 5000 Unit(s) SubCutaneous every 8 hours  insulin regular Infusion 3 Unit(s)/Hr (3 mL/Hr) IV Continuous <Continuous>  methylPREDNISolone sodium succinate Injectable 36 milliGRAM(s) IV Push every 12 hours  milrinone Infusion 0.375 MICROgram(s)/kG/Min (8.2 mL/Hr) IV Continuous <Continuous>  mupirocin 2% Nasal 1 Application(s) Both Nostrils every 12 hours  mycophenolate mofetil 1000 milliGRAM(s) Oral every 12 hours  pantoprazole  Injectable 40 milliGRAM(s) IV Push daily  polyethylene glycol 3350 17 Gram(s) Oral daily  sodium chloride 0.9%. 1000 milliLiter(s) (10 mL/Hr) IV Continuous <Continuous>  tacrolimus 2 milliGRAM(s) Oral <User Schedule>  traMADol 25 milliGRAM(s) Oral every 8 hours  vancomycin    Solution 125 milliGRAM(s) Oral every 12 hours  vasopressin Infusion 0.033 Unit(s)/Min (2 mL/Hr) IV Continuous <Continuous>                          8.1    13.17 )-----------( 155      ( 2022 00:39 )             24.5       06-25    134<L>  |  99  |  51<H>  ----------------------------<  114<H>  4.4   |  25  |  1.84<H>    Ca    8.7      2022 00:39  Phos  3.8     06-25  Mg     2.1     06-25    TPro  5.4<L>  /  Alb  3.4  /  TBili  0.7  /  DBili  x   /  AST  23  /  ALT  30  /  AlkPhos  50  06-25              Daily     Daily Weight in k (2022 00:00)      - @ 07:01  -   @ 07:00  --------------------------------------------------------  IN: 1831 mL / OUT: 3445 mL / NET: -1614 mL      Critically Ill patient  : [ ] preoperative ,   [x] post operative    Requires :  [x] Arterial Line   [x] Central Line  [x ] PA catheter  [ ] IABP  [ ] ECMO  [ ] LVAD  [ ] Ventilator  [x] pacemaker- TPM [ ] Impella [x] HFO2/ Maricruz                       [x ] ABG's     [ x] Pulse Oxymetry Monitoring  Bedside evaluation , monitoring , treatment of hemodynamics , fluids , IVP/ IVCD meds.        Diagnosis:     POD 4- Heart Transplant  & AICD removal     Immunosuppressive therapy     Cardiogenic Shock - post op - resolving      CHF- acute [ x]   chronic [ ]    systolic [ x]   diatolic [x ]          - Echo- EF -  post op           [ ] RV dysfunction          - Cxr-cardiomegally, edema          - Clinical-  [ x]inotropes   [x ]pressors   [x ]diuresis   [ ]IABP   [ ]ECMO   [ ]LVAD   [ ] ventilator   [x] HFO2     Chest tube drainage     Requires chest PT, pulmonary toilet, suctioning to maintain SaO2,  patent airway and treat atelectasis.     IABP   [x ] removed on   and f/u vascular checks      Temporary pacemaker (TPM) interrogation and setting.     Hypoxemia - Requires  [ x] HF O2     Maricruz- 3pm     Hemodynamic lability,  instability. Requires IVCD [x] vasopressors [x] inotropes  [ ] vasodilator  [x] IVSS fluid  to maintain MAP, perfusion, C.I.     [place] Serafin catheter interpretation and therapeutic management of unstable hemodynamics     Hypotension     Fluid overload    DM2- IVCD insulin     Renal Failure - Acute Kidney Injury / CKD     Thrombocytopenia     Hypovolemia     Requires bedside physical therapy, mobilization and total group home care.                 By signing my name below, I, Minerva Whalen, attest that this documentation has been prepared under the direction and in the presence of Julius Kinney MD.   Electronically Signed: Mini Roberts 22 @ 07:24      Discussed with CT surgeon, Physician Assistant - Nurse Practitioner- Critical care medicine team.   Discussed at  AM / PM rounds.   Chart, labs , films reviewed.    Cumulative Critical Care Time Given Today:  CRITICAL CARE ATTENDING - CTICU    MEDICATIONS  (STANDING):  acetaminophen     Tablet .. 650 milliGRAM(s) Oral every 6 hours  chlorhexidine 2% Cloths 1 Application(s) Topical <User Schedule>  dextrose 50% Injectable 50 milliLiter(s) IV Push every 15 minutes  furosemide   Injectable 40 milliGRAM(s) IV Push every 12 hours  gabapentin 100 milliGRAM(s) Oral two times a day  heparin   Injectable 5000 Unit(s) SubCutaneous every 8 hours  insulin regular Infusion 3 Unit(s)/Hr (3 mL/Hr) IV Continuous <Continuous>  methylPREDNISolone sodium succinate Injectable 36 milliGRAM(s) IV Push every 12 hours  milrinone Infusion 0.375 MICROgram(s)/kG/Min (8.2 mL/Hr) IV Continuous <Continuous>  mupirocin 2% Nasal 1 Application(s) Both Nostrils every 12 hours  mycophenolate mofetil 1000 milliGRAM(s) Oral every 12 hours  pantoprazole  Injectable 40 milliGRAM(s) IV Push daily  polyethylene glycol 3350 17 Gram(s) Oral daily  sodium chloride 0.9%. 1000 milliLiter(s) (10 mL/Hr) IV Continuous <Continuous>  tacrolimus 2 milliGRAM(s) Oral <User Schedule>  traMADol 25 milliGRAM(s) Oral every 8 hours  vancomycin    Solution 125 milliGRAM(s) Oral every 12 hours  vasopressin Infusion 0.033 Unit(s)/Min (2 mL/Hr) IV Continuous <Continuous>                          8.1    13.17 )-----------( 155      ( 2022 00:39 )             24.5       06-25    134<L>  |  99  |  51<H>  ----------------------------<  114<H>  4.4   |  25  |  1.84<H>    Ca    8.7      2022 00:39  Phos  3.8     06-25  Mg     2.1     06-25    TPro  5.4<L>  /  Alb  3.4  /  TBili  0.7  /  DBili  x   /  AST  23  /  ALT  30  /  AlkPhos  50  06-25              Daily     Daily Weight in k (2022 00:00)      - @ 07:01  -   @ 07:00  --------------------------------------------------------  IN: 1831 mL / OUT: 3445 mL / NET: -1614 mL      Critically Ill patient  : [ ] preoperative ,   [x] post operative    Requires :  [x] Arterial Line   [x] Central Line  [x ] PA catheter  [ ] IABP  [ ] ECMO  [ ] LVAD  [ ] Ventilator  [x] pacemaker- TPM [ ] Impella [x] HFO2/ Maricruz                       [x ] ABG's     [ x] Pulse Oxymetry Monitoring  Bedside evaluation , monitoring , treatment of hemodynamics , fluids , IVP/ IVCD meds.        Diagnosis:     POD 4- Heart Transplant  & AICD removal     Immunosuppressive therapy     Cardiogenic Shock - post op - resolving      CHF- acute [ x]   chronic [ ]    systolic [ x]   diatolic [x ]          - Echo- EF -  post op           [ ] RV dysfunction          - Cxr-cardiomegally, edema          - Clinical-  [ x]inotropes   [x ]pressors   [x ]diuresis   [ ]IABP   [ ]ECMO   [ ]LVAD   [ ] ventilator   [x] HFO2     Chest tube drainage     Requires chest PT, pulmonary toilet, suctioning to maintain SaO2,  patent airway and treat atelectasis.     IABP   [x ] removed on   and f/u vascular checks      Temporary pacemaker (TPM) interrogation and setting.     Hypoxemia - Requires  [ x] HF O2     Maricruz- 3pm weaning     Hemodynamic lability,  instability. Requires IVCD [x] vasopressors [x] inotropes  [ ] vasodilator  [x] IVSS fluid  to maintain MAP, perfusion, C.I.     [place] Serafin catheter interpretation and therapeutic management of unstable hemodynamics     Hypotension     Fluid overload    DM2- IVCD insulin     Renal Failure - Acute Kidney Injury / CKD     Thrombocytopenia     Fluid  overload     Requires bedside physical therapy, mobilization and total USP care.                 By signing my name below, I, Minerva Whalen, attest that this documentation has been prepared under the direction and in the presence of Julius Kinney MD.   Electronically Signed: Mini Roberts 22 @ 07:24    I, Julius Kinney, personally performed the services described in this documentation. All medical record entries made by the scribe were at my direction and in my presence. I have reviewed the chart and agree that the record reflects my personal performance and is accurate and complete.   Julius Kinney MD.       Discussed with CT surgeon, Physician Assistant - Nurse Practitioner- Critical care medicine team.   Discussed at  AM / PM rounds.   Chart, labs , films reviewed.    Cumulative Critical Care Time Given Today:  40 min

## 2022-06-25 NOTE — PROGRESS NOTE ADULT - PROBLEM SELECTOR PLAN 1
Pt. with hemodynamically mediated JAGRUTI after OHT, in the setting of renal venous congestion. Upon review of labs, Scr was 1.2 on 4/19/22 and was elevated at 1.8 on 5/24/22. SCr downtrended to 1.01 on 6/20/22. Pt underwent OHT on 6/21/22 and required IABP post operatively. Scr uptrended to 1.23 on 6/23/22 with elevated PA pressures. SCr increased to 1.97. UA done on 6/8/22 showed trace blood and proteinuria.    Today Cr is stable/elevated to 1.8mg/dl, non oliguric with ~3.1L urine output. Continue IV diuretics 40mg IV bid, titrate to keep CVP < 10. Labs reviewed. Pt critically ill with BP maintained on IV vasopressors. No urgent indication for RRT. Monitor labs and urine output.

## 2022-06-25 NOTE — PROGRESS NOTE ADULT - SUBJECTIVE AND OBJECTIVE BOX
Subjective:    Medications:  acetaminophen     Tablet .. 650 milliGRAM(s) Oral every 6 hours  chlorhexidine 2% Cloths 1 Application(s) Topical <User Schedule>  dextrose 50% Injectable 50 milliLiter(s) IV Push every 15 minutes  furosemide   Injectable 40 milliGRAM(s) IV Push every 12 hours  gabapentin 100 milliGRAM(s) Oral two times a day  heparin   Injectable 5000 Unit(s) SubCutaneous every 8 hours  insulin regular Infusion 3 Unit(s)/Hr IV Continuous <Continuous>  methylPREDNISolone sodium succinate Injectable 36 milliGRAM(s) IV Push every 12 hours  milrinone Infusion 0.375 MICROgram(s)/kG/Min IV Continuous <Continuous>  mupirocin 2% Nasal 1 Application(s) Both Nostrils every 12 hours  mycophenolate mofetil 1000 milliGRAM(s) Oral every 12 hours  pantoprazole  Injectable 40 milliGRAM(s) IV Push daily  polyethylene glycol 3350 17 Gram(s) Oral daily  sodium chloride 0.9%. 1000 milliLiter(s) IV Continuous <Continuous>  tacrolimus 2 milliGRAM(s) Oral <User Schedule>  traMADol 25 milliGRAM(s) Oral every 8 hours  vancomycin    Solution 125 milliGRAM(s) Oral every 12 hours  vasopressin Infusion 0.033 Unit(s)/Min IV Continuous <Continuous>      Physical Exam:    Vitals:  Vital Signs Last 24 Hours  T(C): 35.9 (22 @ 08:00), Max: 36.2 (22 @ 12:00)  HR: 94 (22 @ 08:31) (88 - 98)  BP: 136/78 (22 @ 08:00) (114/69 - 136/78)  RR: 13 (22 @ 08:31) (11 - 41)  SpO2: 100% (22 @ 08:31) (97% - 100%)    Weight in k ( @ 00:00)    I&O's Summary    2022 07:01  -  2022 07:00  --------------------------------------------------------  IN: 1831 mL / OUT: 3445 mL / NET: -1614 mL    2022 07:01  -  2022 08:35  --------------------------------------------------------  IN: 138.2 mL / OUT: 60 mL / NET: 78.2 mL        Tele:    General: No distress. Comfortable.  HEENT: EOM intact.  Neck: Neck supple. JVP not elevated. No masses  Chest: Clear to auscultation bilaterally  CV: Normal S1 and S2. No murmurs, rub, or gallops. Radial pulses normal.  Abdomen: Soft, non-distended, non-tender  Skin: No rashes or skin breakdown  Neurology: Alert and oriented times three. Sensation intact  Psych: Affect normal    Labs:                        8.1    13.17 )-----------( 155      ( 2022 00:39 )             24.5         134<L>  |  99  |  51<H>  ----------------------------<  114<H>  4.4   |  25  |  1.84<H>    Ca    8.7      2022 00:39  Phos  3.8       Mg     2.1         TPro  5.4<L>  /  Alb  3.4  /  TBili  0.7  /  DBili  x   /  AST  23  /  ALT  30  /  AlkPhos  50              Oxygen Saturation, Mixed: 75.5 ( @ 03:33)  Oxygen Saturation, Mixed: 80.9 ( @ 00:34)  Oxygen Saturation, Mixed: 75.5 ( @ 19:02)  Oxygen Saturation, Mixed: 75.7 ( @ 16:06)  Oxygen Saturation, Mixed: 76.5 ( @ 12:43)  Oxygen Saturation, Mixed: 74.0 ( @ 06:09)  Oxygen Saturation, Mixed: 77.0 ( @ 01:31)  Oxygen Saturation, Mixed: 76.5 ( @ 22:09)  Oxygen Saturation, Mixed: 68.4 ( @ 17:00)  Oxygen Saturation, Mixed: 73.0 ( @ 14:30)  Oxygen Saturation, Mixed: 61.4 ( @ 12:30)  Oxygen Saturation, Mixed: 75.4 ( @ 08:00)  Oxygen Saturation, Mixed: 75.1 ( @ 01:50)  Oxygen Saturation, Mixed: 76.5 ( @ 23:55)  Oxygen Saturation, Mixed: 79.0 ( @ 21:00)  Oxygen Saturation, Mixed: 77.5 ( @ 16:46)  Oxygen Saturation, Mixed: 66.0 ( @ 12:02)  Oxygen Saturation, Mixed: 75.1 ( @ 08:55)

## 2022-06-25 NOTE — PROGRESS NOTE ADULT - CRITICAL CARE ATTENDING COMMENT
making excellent progress  just off 2ppm NO  meds: milrinone .375,  off, vanco PO, lasix 40 IV bid, jerry, tramadol, PPI, solumedrol taper held. cellcept 1g bid,  prograf 2 bid.   CVP 8, PA 55/17 31, PA sat 76 Milana CI 2.4  HR , afeb, 120/-130/ still on high flow  I/O: -1.6  06-25  134<L>  |  99  |  51<H>  ----------------------------<  114<H>  4.4   |  25  |  1.84<H>  Cr 1.84, 1.92, 1.27    Ca    8.7      25 Jun 2022 00:39  Phos  3.8     06-25  Mg     2.1     06-25  TPro  5.4<L>  /  Alb  3.4  /  TBili  0.7  /  DBili  x   /  AST  23  /  ALT  30  /  AlkPhos  50  06-25                        8.1    13.17 )-----------( 155      ( 25 Jun 2022 00:39 )             24.5   WBC 13.17, 16  discussed on MDR:  remains with reactive pulmonary pressures but normal output off NO. should d/c swan and introducer.  no change in milrinone   make lasix 40 IV QD with albumin for serum albumin < 3. Target net 500-1L per day  Start HDZN with dose to dose uptitration for MAPS BY NON INVASIVE for < 80  Will increase prograf slowly,  3 bid. no steriod taper for one further day.   Start abs for pos cultures from ICD site (ID informed)   Dany Dominique

## 2022-06-25 NOTE — PROGRESS NOTE ADULT - SUBJECTIVE AND OBJECTIVE BOX
[facility] DIVISION OF KIDNEY DISEASES AND HYPERTENSION -- FOLLOW UP NOTE  --------------------------------------------------------------------------------  If any questions, please feel free to contact me  NS pager: [phone], [facility]: [phone]  Ranjeet Guillen M.D.  Nephrology Fellow  --------------------------------------------------------------------------------    HPI:  66M male with PMHx HTN, HLD, type 2 DM, chronic HFrEF,  (EF 25-30% by Echo) underwent OHT on 6/21/22. Nephrology consulted for JAGRUTI.     Upon review of labs, Scr was 1.2 on 4/19/22 and was elevated at 1.8 on 5/24/22. SCr downtrended to 1.01 on 6/20/22. Pt underwent OHT on 6/21/22 and required IABP post operatively. Scr uptrended to 1.23 on 6/23/22 with elevated PA pressures. SCr increased to 1.97. Pt currently on diuretics and non oliguric. UA done on 6/8/22 showed trace blood and proteinuria. Today Cr stable/elevated to 1.8mg/dl.     Pt. seen and examined in CTU, sitting comfortably in chair. Denies any acute complaints. HE is non oliguric with UOP: 3.1L in the past 24hrs. Vitals/labs/imaging reviewed          PAST HISTORY  --------------------------------------------------------------------------------  No significant changes to PMH, PSH, FHx, SHx, unless otherwise noted    ALLERGIES & MEDICATIONS  --------------------------------------------------------------------------------  Allergies    No Known Allergies    Intolerances      Standing Inpatient Medications  acetaminophen     Tablet .. 650 milliGRAM(s) Oral every 6 hours  chlorhexidine 2% Cloths 1 Application(s) Topical <User Schedule>  dextrose 50% Injectable 50 milliLiter(s) IV Push every 15 minutes  furosemide   Injectable 40 milliGRAM(s) IV Push every 12 hours  gabapentin 100 milliGRAM(s) Oral two times a day  heparin   Injectable 5000 Unit(s) SubCutaneous every 8 hours  insulin regular Infusion 3 Unit(s)/Hr IV Continuous <Continuous>  methylPREDNISolone sodium succinate Injectable 36 milliGRAM(s) IV Push every 12 hours  milrinone Infusion 0.375 MICROgram(s)/kG/Min IV Continuous <Continuous>  mupirocin 2% Nasal 1 Application(s) Both Nostrils every 12 hours  mycophenolate mofetil 1000 milliGRAM(s) Oral every 12 hours  pantoprazole  Injectable 40 milliGRAM(s) IV Push daily  polyethylene glycol 3350 17 Gram(s) Oral daily  sodium chloride 0.9%. 1000 milliLiter(s) IV Continuous <Continuous>  tacrolimus 2 milliGRAM(s) Oral <User Schedule>  traMADol 25 milliGRAM(s) Oral every 8 hours  vancomycin    Solution 125 milliGRAM(s) Oral every 12 hours  vasopressin Infusion 0.033 Unit(s)/Min IV Continuous <Continuous>    PRN Inpatient Medications      REVIEW OF SYSTEMS  --------------------------------------------------------------------------------  Gen: +fatigue  No fevers/chills  Skin: No rashes  Head/Eyes/Ears: Normal hearing,   Respiratory: No dyspnea, cough  CV: No chest pain  GI: No abdominal pain, diarrhea  : No dysuria, hematuria  MSK: +shoulder pain-  +edema    All other systems were reviewed and are negative, except as noted.    VITALS/PHYSICAL EXAM  --------------------------------------------------------------------------------  T(C): 35.9 (06-25-22 @ 08:00), Max: 36.2 (06-24-22 @ 12:00)  HR: 93 (06-25-22 @ 10:00) (88 - 98)  BP: 132/70 (06-25-22 @ 09:00) (114/69 - 136/78)  RR: 25 (06-25-22 @ 10:00) (11 - 41)  SpO2: 99% (06-25-22 @ 10:00) (97% - 100%)  Wt(kg): --        06-24-22 @ 07:01  -  06-25-22 @ 07:00  --------------------------------------------------------  IN: 1831 mL / OUT: 3445 mL / NET: -1614 mL    06-25-22 @ 07:01  -  06-25-22 @ 10:25  --------------------------------------------------------  IN: 180.6 mL / OUT: 185 mL / NET: -4.4 mL        Physical Exam:  	Gen: NAD  	HEENT: Anicteric  	Pulm: CTA B/L  	CV: S1S2+, midline sx scar, dressing +  	Abd: Soft, +BS    	Ext: LE edema B/L+  	Neuro: Awake              : Hernandez+ with clear urine in the bag  	Skin: Warm and dry    LABS/STUDIES  --------------------------------------------------------------------------------              8.1    13.17 >-----------<  155      [06-25-22 @ 00:39]              24.5     134  |  99  |  51  ----------------------------<  114      [06-25-22 @ 00:39]  4.4   |  25  |  1.84        Ca     8.7     [06-25-22 @ 00:39]      Mg     2.1     [06-25-22 @ 00:39]      Phos  3.8     [06-25-22 @ 00:39]    TPro  5.4  /  Alb  3.4  /  TBili  0.7  /  DBili  x   /  AST  23  /  ALT  30  /  AlkPhos  50  [06-25-22 @ 00:39]          Creatinine Trend:  SCr 1.84 [06-25 @ 00:39]  SCr 1.92 [06-24 @ 01:37]  SCr 1.27 [06-23 @ 00:52]  SCr 1.10 [06-22 @ 01:10]  SCr 1.05 [06-21 @ 20:45]    Urinalysis - [06-08-22 @ 00:26]      Color Light Yellow / Appearance Clear / SG 1.014 / pH 6.0      Gluc Negative / Ketone Negative  / Bili Negative / Urobili Negative       Blood Trace / Protein Trace / Leuk Est Negative / Nitrite Negative      RBC 1 / WBC 0 / Hyaline 0 / Gran  / Sq Epi  / Non Sq Epi 0 / Bacteria Negative      Iron 26, TIBC 295, %sat 9      [05-27-22 @ 17:02]  Ferritin 217      [05-27-22 @ 17:02]  TSH 0.57      [06-22-22 @ 21:13]  Lipid: chol 159, , HDL 29, LDL --      [05-20-22 @ 14:17]    HBsAb Nonreact      [05-27-22 @ 17:23]  HBsAg Nonreact      [05-27-22 @ 17:23]  HBcAb Nonreact      [05-27-22 @ 17:23]  HIV Nonreact      [05-27-22 @ 15:19]    MELIZA: titer 1:320, pattern Homogeneous      [05-27-22 @ 17:18]  Rheumatoid Factor 13      [05-27-22 @ 17:02]  Syphilis Screen (Treponema Pallidum Ab) Negative      [06-02-22 @ 21:12]  Free Light Chains: kappa 3.64, lambda 2.71, ratio = 1.34      [05-27 @ 17:20]  Immunofixation Serum: No Monoclonal Band Identified    Reference Range: None Detected      [05-27-22 @ 17:20]  SPEP Interpretation: Normal Electrophoresis Pattern      [05-27-22 @ 17:20]  Immunofixation Urine:   Reference Range: None Detected      [06-05-22 @ 12:15]  UPEP Interpretation: Normal Electrophoresis Pattern      [06-05-22 @ 12:15]

## 2022-06-25 NOTE — PROGRESS NOTE ADULT - ASSESSMENT
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 5/20 with near syncope in setting of worsening HF symptoms and found to have 41 episodes of VT, many terminating with ATP. LHC did not reveal new obstructive CAD and he underwent EPS which did not reveal endocardial substrate amenable for ablation. RHC revealed severely depressed cardiac output and he was transferred to [facility] 5/26 for advanced therapies evaluation.    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 He improved significantly with sequential uptitration of inotropes and increased pacing rate, but on 6/6 he had worsening JAGRUTI. He is s/p RHC which revealed severely elevated filling pressures, severe cpcPH, and CI of 1.9 on milrinone 0.5. IABP was placed and his renal function/PAPs have improved. He is MCS dependent and was inadequately supported with high dose inotropes, so was listed UNOS status 2e for OHT, awaiting suitable donor. However, was made status 7 as he became febrile, last 6/7 T 38. He has been afebrile since and blood cultures from 6/7 with NGTD x 48 hours and his leukocytosis has not worsened. Discussed with transplant ID and since bld cx negative x48hrs he has been re-listed UNOS status 2e.     A suitable donor was identified and patient underwent OHT with Dr. Earl/Maria C on 6/21 (ischemic time 261 mins, CMV +/+, Toxo -/-). On POD 1 IABP was removed. Extubated afternoon 6/22    #Positive Culture Finding (Gram Positive Cocci and Gram Variable Rods from AICD pocket)  Only in liquid media so could be contaminant but reasonable to cover given murky fluid noted around pocket  --Favor keeping Cefepime instead of Cefazolin (in case Gram Variable represents Gram Negative Devante)  --Continue Vancomycin  --Follow operative cultures  --Follow WBC trend  --Follow temperature curve    #Prophylactic Antibiotics  Vancomycin plus Cefepime completed on 6/24  On PO Vancomycin    CMV:  intermediate risk  will eventually need valcyte once electrolytes and renal function stabilizes    toxo  low risk    PJP  will eventually need Bactrim or Mepron    thrush:  greyson Zaldivar M.D.  [facility] Division of Infectious Disease  8AM-5PM Monday - Friday: Available on [employer] Teams  After Hours and Holidays (or if no response on [employer] Teams): Please contact the Infectious Diseases Office at [phone]     The above assessment and plan were discussed with CTICU Team and Dr Bustillos

## 2022-06-25 NOTE — PROGRESS NOTE ADULT - SUBJECTIVE AND OBJECTIVE BOX
Follow Up:  s/p OHT, Positive AICD Pocket cultures    Interval History: afebrile. cultures from AICD pocket resulted with gram positive cocci and gram variable rods in the fluid media. patient noting minimal soreness around AICD site.     REVIEW OF SYSTEMS  [  ] ROS unobtainable because:    [ x ] All other systems negative except as noted below    Constitutional:  [ ] fever [ ] chills  [ ] weight loss  [ ] weakness  Skin:  [ ] rash [ ] phlebitis	  Eyes: [ ] icterus [ ] pain  [ ] discharge	  ENMT: [ ] sore throat  [ ] thrush [ ] ulcers [ ] exudates  Respiratory: [ ] dyspnea [ ] hemoptysis [ ] cough [ ] sputum	  Cardiovascular:  [ ] chest pain [ ] palpitations [ ] edema	  Gastrointestinal:  [ ] nausea [ ] vomiting [ ] diarrhea [ ] constipation [ ] pain	  Genitourinary:  [ ] dysuria [ ] frequency [ ] hematuria [ ] discharge [ ] flank pain  [ ] incontinence  Musculoskeletal:  [ ] myalgias [ ] arthralgias [ ] arthritis  [ ] back pain +minimal AICD site soreness  Neurological:  [ ] headache [ ] seizures  [ ] confusion/altered mental status    Allergies  No Known Allergies        ANTIMICROBIALS:  cefepime   IVPB 1000 every 8 hours  vancomycin    Solution 125 every 12 hours  vancomycin  IVPB 750 every 12 hours      OTHER MEDS:  MEDICATIONS  (STANDING):  acetaminophen     Tablet .. 650 every 6 hours  dextrose 50% Injectable 50 every 15 minutes  furosemide   Injectable 40 daily  gabapentin 100 two times a day  heparin   Injectable 5000 every 8 hours  hydrALAZINE 10 every 8 hours  insulin regular Infusion 3 <Continuous>  methylPREDNISolone sodium succinate Injectable 36 every 12 hours  milrinone Infusion 0.375 <Continuous>  mycophenolate mofetil 1000 every 12 hours  pantoprazole  Injectable 40 daily  polyethylene glycol 3350 17 daily  tacrolimus 2 <User Schedule>  traMADol 25 every 8 hours      Vital Signs Last 24 Hrs  T(C): 36.1 (25 Jun 2022 12:00), Max: 36.1 (25 Jun 2022 04:00)  T(F): 97 (25 Jun 2022 12:00), Max: 97 (25 Jun 2022 04:00)  HR: 96 (25 Jun 2022 15:00) (88 - 98)  BP: 126/68 (25 Jun 2022 15:00) (114/69 - 136/78)  BP(mean): 91 (25 Jun 2022 15:00) (85 - 101)  RR: 15 (25 Jun 2022 15:00) (11 - 27)  SpO2: 99% (25 Jun 2022 15:00) (98% - 100%)    PHYSICAL EXAMINATION:  General: Alert and Awake, NAD  HEENT: PERRL, EOMI  Chest; Dressing over sternotomy. dressing over the left AICD, minimal soreness to palpation   Cardiac: RRR, No M/R/G  Resp: CTAB, No Wh/Rh/Ra  Abdomen: NBS, NT/ND, No HSM, No rigidity or guarding  MSK: No LE edema. No Calf tenderness  Skin: No rashes or lesions. Skin is warm and dry to the touch.   Neuro: Alert and Awake. CN 2-12 Grossly intact. Moves all four extremities spontaneously.  Psych: Calm, Pleasant, Cooperative                          8.1    13.17 )-----------( 155      ( 25 Jun 2022 00:39 )             24.5       06-25    134<L>  |  99  |  51<H>  ----------------------------<  114<H>  4.4   |  25  |  1.84<H>    Ca    8.7      25 Jun 2022 00:39  Phos  3.8     06-25  Mg     2.1     06-25    TPro  5.4<L>  /  Alb  3.4  /  TBili  0.7  /  DBili  x   /  AST  23  /  ALT  30  /  AlkPhos  50  06-25          MICROBIOLOGY:  v  .Tissue Other  06-21-22   Culture is being performed.  --    Few polymorphonuclear leukocytes seen per low power field  No organisms seen per oil power field      .Blood Blood  06-07-22   No Growth Final  --  --      .Blood Blood-Peripheral  06-02-22   No Growth Final  --  --      .Blood Blood-Peripheral  06-02-22   No Growth Final  --  --      .Blood Blood  05-28-22   No Growth Final  --  --        CMV IgG Antibody: >10.00 U/mL (05-27-22 @ 17:18)  Toxoplasma IgG Screen: <3.0 IU/mL (05-27-22 @ 17:18)  HIV-1 RNA Quantitative, Viral Load Log: NOT DET. lg /mL (05-27-22 @ 15:30)          RADIOLOGY:    <The imaging below has been reviewed and visualized by me independently. Findings as detailed in report below>    < from: Xray Chest 1 View- PORTABLE-Routine (Xray Chest 1 View- PORTABLE-Routine in AM.) (06.25.22 @ 02:24) >  Impression: Patchy airspace opacity along bases bilaterally which is   slightly more prominent compared to the prior examination.    < end of copied text >

## 2022-06-26 LAB
-  AMIKACIN: SIGNIFICANT CHANGE UP
-  AMOXICILLIN/CLAVULANIC ACID: SIGNIFICANT CHANGE UP
-  AMPICILLIN/SULBACTAM: SIGNIFICANT CHANGE UP
-  AMPICILLIN/SULBACTAM: SIGNIFICANT CHANGE UP
-  AMPICILLIN: SIGNIFICANT CHANGE UP
-  AZTREONAM: SIGNIFICANT CHANGE UP
-  CEFAZOLIN: SIGNIFICANT CHANGE UP
-  CEFAZOLIN: SIGNIFICANT CHANGE UP
-  CEFEPIME: SIGNIFICANT CHANGE UP
-  CEFOXITIN: SIGNIFICANT CHANGE UP
-  CEFTRIAXONE: SIGNIFICANT CHANGE UP
-  CIPROFLOXACIN: SIGNIFICANT CHANGE UP
-  CLINDAMYCIN: SIGNIFICANT CHANGE UP
-  ERTAPENEM: SIGNIFICANT CHANGE UP
-  ERYTHROMYCIN: SIGNIFICANT CHANGE UP
-  GENTAMICIN: SIGNIFICANT CHANGE UP
-  GENTAMICIN: SIGNIFICANT CHANGE UP
-  IMIPENEM: SIGNIFICANT CHANGE UP
-  LEVOFLOXACIN: SIGNIFICANT CHANGE UP
-  MEROPENEM: SIGNIFICANT CHANGE UP
-  OXACILLIN: SIGNIFICANT CHANGE UP
-  PENICILLIN: SIGNIFICANT CHANGE UP
-  PIPERACILLIN/TAZOBACTAM: SIGNIFICANT CHANGE UP
-  RIFAMPIN: SIGNIFICANT CHANGE UP
-  TETRACYCLINE: SIGNIFICANT CHANGE UP
-  TOBRAMYCIN: SIGNIFICANT CHANGE UP
-  TRIMETHOPRIM/SULFAMETHOXAZOLE: SIGNIFICANT CHANGE UP
-  TRIMETHOPRIM/SULFAMETHOXAZOLE: SIGNIFICANT CHANGE UP
-  VANCOMYCIN: SIGNIFICANT CHANGE UP
ALBUMIN SERPL ELPH-MCNC: 3.9 G/DL — SIGNIFICANT CHANGE UP (ref 3.3–5)
ALP SERPL-CCNC: 47 U/L — SIGNIFICANT CHANGE UP (ref 40–120)
ALT FLD-CCNC: 29 U/L — SIGNIFICANT CHANGE UP (ref 10–45)
ANION GAP SERPL CALC-SCNC: 9 MMOL/L — SIGNIFICANT CHANGE UP (ref 5–17)
AST SERPL-CCNC: 19 U/L — SIGNIFICANT CHANGE UP (ref 10–40)
BASOPHILS # BLD AUTO: 0 K/UL — SIGNIFICANT CHANGE UP (ref 0–0.2)
BASOPHILS NFR BLD AUTO: 0 % — SIGNIFICANT CHANGE UP (ref 0–2)
BILIRUB SERPL-MCNC: 0.8 MG/DL — SIGNIFICANT CHANGE UP (ref 0.2–1.2)
BUN SERPL-MCNC: 50 MG/DL — HIGH (ref 7–23)
CALCIUM SERPL-MCNC: 8.9 MG/DL — SIGNIFICANT CHANGE UP (ref 8.4–10.5)
CHLORIDE SERPL-SCNC: 99 MMOL/L — SIGNIFICANT CHANGE UP (ref 96–108)
CO2 SERPL-SCNC: 25 MMOL/L — SIGNIFICANT CHANGE UP (ref 22–31)
CREAT SERPL-MCNC: 1.77 MG/DL — HIGH (ref 0.5–1.3)
CULTURE RESULTS: SIGNIFICANT CHANGE UP
EGFR: 42 ML/MIN/1.73M2 — LOW
EOSINOPHIL # BLD AUTO: 0 K/UL — SIGNIFICANT CHANGE UP (ref 0–0.5)
EOSINOPHIL NFR BLD AUTO: 0 % — SIGNIFICANT CHANGE UP (ref 0–6)
GAS PNL BLDA: SIGNIFICANT CHANGE UP
GLUCOSE BLDC GLUCOMTR-MCNC: 115 MG/DL — HIGH (ref 70–99)
GLUCOSE BLDC GLUCOMTR-MCNC: 116 MG/DL — HIGH (ref 70–99)
GLUCOSE BLDC GLUCOMTR-MCNC: 117 MG/DL — HIGH (ref 70–99)
GLUCOSE BLDC GLUCOMTR-MCNC: 118 MG/DL — HIGH (ref 70–99)
GLUCOSE BLDC GLUCOMTR-MCNC: 119 MG/DL — HIGH (ref 70–99)
GLUCOSE BLDC GLUCOMTR-MCNC: 123 MG/DL — HIGH (ref 70–99)
GLUCOSE BLDC GLUCOMTR-MCNC: 126 MG/DL — HIGH (ref 70–99)
GLUCOSE BLDC GLUCOMTR-MCNC: 127 MG/DL — HIGH (ref 70–99)
GLUCOSE BLDC GLUCOMTR-MCNC: 130 MG/DL — HIGH (ref 70–99)
GLUCOSE BLDC GLUCOMTR-MCNC: 134 MG/DL — HIGH (ref 70–99)
GLUCOSE BLDC GLUCOMTR-MCNC: 135 MG/DL — HIGH (ref 70–99)
GLUCOSE BLDC GLUCOMTR-MCNC: 137 MG/DL — HIGH (ref 70–99)
GLUCOSE BLDC GLUCOMTR-MCNC: 143 MG/DL — HIGH (ref 70–99)
GLUCOSE BLDC GLUCOMTR-MCNC: 154 MG/DL — HIGH (ref 70–99)
GLUCOSE BLDC GLUCOMTR-MCNC: 155 MG/DL — HIGH (ref 70–99)
GLUCOSE BLDC GLUCOMTR-MCNC: 159 MG/DL — HIGH (ref 70–99)
GLUCOSE BLDC GLUCOMTR-MCNC: 166 MG/DL — HIGH (ref 70–99)
GLUCOSE BLDC GLUCOMTR-MCNC: 169 MG/DL — HIGH (ref 70–99)
GLUCOSE BLDC GLUCOMTR-MCNC: 173 MG/DL — HIGH (ref 70–99)
GLUCOSE BLDC GLUCOMTR-MCNC: 195 MG/DL — HIGH (ref 70–99)
GLUCOSE BLDC GLUCOMTR-MCNC: 195 MG/DL — HIGH (ref 70–99)
GLUCOSE BLDC GLUCOMTR-MCNC: 217 MG/DL — HIGH (ref 70–99)
GLUCOSE BLDC GLUCOMTR-MCNC: 232 MG/DL — HIGH (ref 70–99)
GLUCOSE SERPL-MCNC: 150 MG/DL — HIGH (ref 70–99)
HCT VFR BLD CALC: 27.9 % — LOW (ref 39–50)
HGB BLD-MCNC: 9.5 G/DL — LOW (ref 13–17)
IMM GRANULOCYTES NFR BLD AUTO: 0.9 % — SIGNIFICANT CHANGE UP (ref 0–1.5)
LYMPHOCYTES # BLD AUTO: 0.98 K/UL — LOW (ref 1–3.3)
LYMPHOCYTES # BLD AUTO: 9.6 % — LOW (ref 13–44)
MAGNESIUM SERPL-MCNC: 2.1 MG/DL — SIGNIFICANT CHANGE UP (ref 1.6–2.6)
MCHC RBC-ENTMCNC: 29.8 PG — SIGNIFICANT CHANGE UP (ref 27–34)
MCHC RBC-ENTMCNC: 34.1 GM/DL — SIGNIFICANT CHANGE UP (ref 32–36)
MCV RBC AUTO: 87.5 FL — SIGNIFICANT CHANGE UP (ref 80–100)
METHOD TYPE: SIGNIFICANT CHANGE UP
METHOD TYPE: SIGNIFICANT CHANGE UP
MONOCYTES # BLD AUTO: 0.39 K/UL — SIGNIFICANT CHANGE UP (ref 0–0.9)
MONOCYTES NFR BLD AUTO: 3.8 % — SIGNIFICANT CHANGE UP (ref 2–14)
NEUTROPHILS # BLD AUTO: 8.72 K/UL — HIGH (ref 1.8–7.4)
NEUTROPHILS NFR BLD AUTO: 85.7 % — HIGH (ref 43–77)
NRBC # BLD: 0 /100 WBCS — SIGNIFICANT CHANGE UP (ref 0–0)
ORGANISM # SPEC MICROSCOPIC CNT: SIGNIFICANT CHANGE UP
PHOSPHATE SERPL-MCNC: 3 MG/DL — SIGNIFICANT CHANGE UP (ref 2.5–4.5)
PLATELET # BLD AUTO: 192 K/UL — SIGNIFICANT CHANGE UP (ref 150–400)
POTASSIUM SERPL-MCNC: 4.8 MMOL/L — SIGNIFICANT CHANGE UP (ref 3.5–5.3)
POTASSIUM SERPL-SCNC: 4.8 MMOL/L — SIGNIFICANT CHANGE UP (ref 3.5–5.3)
PROT SERPL-MCNC: 5.8 G/DL — LOW (ref 6–8.3)
RBC # BLD: 3.19 M/UL — LOW (ref 4.2–5.8)
RBC # FLD: 16.8 % — HIGH (ref 10.3–14.5)
SODIUM SERPL-SCNC: 133 MMOL/L — LOW (ref 135–145)
SPECIMEN SOURCE: SIGNIFICANT CHANGE UP
TACROLIMUS SERPL-MCNC: 5.9 NG/ML — SIGNIFICANT CHANGE UP
VANCOMYCIN TROUGH SERPL-MCNC: 13.8 UG/ML — SIGNIFICANT CHANGE UP (ref 10–20)
VANCOMYCIN TROUGH SERPL-MCNC: 16.8 UG/ML — SIGNIFICANT CHANGE UP (ref 10–20)
WBC # BLD: 10.18 K/UL — SIGNIFICANT CHANGE UP (ref 3.8–10.5)
WBC # FLD AUTO: 10.18 K/UL — SIGNIFICANT CHANGE UP (ref 3.8–10.5)

## 2022-06-26 PROCEDURE — 99292 CRITICAL CARE ADDL 30 MIN: CPT

## 2022-06-26 PROCEDURE — 71045 X-RAY EXAM CHEST 1 VIEW: CPT | Mod: 26

## 2022-06-26 PROCEDURE — 99291 CRITICAL CARE FIRST HOUR: CPT

## 2022-06-26 PROCEDURE — 99233 SBSQ HOSP IP/OBS HIGH 50: CPT | Mod: GC

## 2022-06-26 PROCEDURE — 99232 SBSQ HOSP IP/OBS MODERATE 35: CPT

## 2022-06-26 RX ORDER — HYDRALAZINE HCL 50 MG
5 TABLET ORAL ONCE
Refills: 0 | Status: COMPLETED | OUTPATIENT
Start: 2022-06-26 | End: 2022-06-26

## 2022-06-26 RX ORDER — FUROSEMIDE 40 MG
40 TABLET ORAL
Refills: 0 | Status: DISCONTINUED | OUTPATIENT
Start: 2022-06-26 | End: 2022-06-27

## 2022-06-26 RX ORDER — NIFEDIPINE 30 MG
60 TABLET, EXTENDED RELEASE 24 HR ORAL DAILY
Refills: 0 | Status: DISCONTINUED | OUTPATIENT
Start: 2022-06-26 | End: 2022-06-26

## 2022-06-26 RX ORDER — CALCIUM GLUCONATE 100 MG/ML
2 VIAL (ML) INTRAVENOUS ONCE
Refills: 0 | Status: COMPLETED | OUTPATIENT
Start: 2022-06-26 | End: 2022-06-26

## 2022-06-26 RX ORDER — PANTOPRAZOLE SODIUM 20 MG/1
40 TABLET, DELAYED RELEASE ORAL
Refills: 0 | Status: DISCONTINUED | OUTPATIENT
Start: 2022-06-27 | End: 2022-07-08

## 2022-06-26 RX ORDER — FUROSEMIDE 40 MG
20 TABLET ORAL ONCE
Refills: 0 | Status: COMPLETED | OUTPATIENT
Start: 2022-06-26 | End: 2022-06-26

## 2022-06-26 RX ORDER — TACROLIMUS 5 MG/1
4 CAPSULE ORAL
Refills: 0 | Status: DISCONTINUED | OUTPATIENT
Start: 2022-06-26 | End: 2022-06-28

## 2022-06-26 RX ADMIN — Medication 250 MILLIGRAM(S): at 00:44

## 2022-06-26 RX ADMIN — Medication 650 MILLIGRAM(S): at 12:17

## 2022-06-26 RX ADMIN — HEPARIN SODIUM 5000 UNIT(S): 5000 INJECTION INTRAVENOUS; SUBCUTANEOUS at 13:47

## 2022-06-26 RX ADMIN — CEFEPIME 100 MILLIGRAM(S): 1 INJECTION, POWDER, FOR SOLUTION INTRAMUSCULAR; INTRAVENOUS at 21:38

## 2022-06-26 RX ADMIN — Medication 40 MILLIGRAM(S): at 06:40

## 2022-06-26 RX ADMIN — PANTOPRAZOLE SODIUM 40 MILLIGRAM(S): 20 TABLET, DELAYED RELEASE ORAL at 11:47

## 2022-06-26 RX ADMIN — Medication 36 MILLIGRAM(S): at 20:12

## 2022-06-26 RX ADMIN — Medication 10 MILLIGRAM(S): at 13:47

## 2022-06-26 RX ADMIN — Medication 650 MILLIGRAM(S): at 06:58

## 2022-06-26 RX ADMIN — CHLORHEXIDINE GLUCONATE 1 APPLICATION(S): 213 SOLUTION TOPICAL at 06:58

## 2022-06-26 RX ADMIN — Medication 60 MILLIGRAM(S): at 11:47

## 2022-06-26 RX ADMIN — GABAPENTIN 100 MILLIGRAM(S): 400 CAPSULE ORAL at 17:20

## 2022-06-26 RX ADMIN — CEFEPIME 100 MILLIGRAM(S): 1 INJECTION, POWDER, FOR SOLUTION INTRAMUSCULAR; INTRAVENOUS at 06:40

## 2022-06-26 RX ADMIN — Medication 10 MILLIGRAM(S): at 06:41

## 2022-06-26 RX ADMIN — Medication 125 MILLIGRAM(S): at 08:27

## 2022-06-26 RX ADMIN — HEPARIN SODIUM 5000 UNIT(S): 5000 INJECTION INTRAVENOUS; SUBCUTANEOUS at 21:39

## 2022-06-26 RX ADMIN — INSULIN HUMAN 3 UNIT(S)/HR: 100 INJECTION, SOLUTION SUBCUTANEOUS at 20:11

## 2022-06-26 RX ADMIN — TRAMADOL HYDROCHLORIDE 25 MILLIGRAM(S): 50 TABLET ORAL at 14:16

## 2022-06-26 RX ADMIN — SODIUM CHLORIDE 10 MILLILITER(S): 9 INJECTION INTRAMUSCULAR; INTRAVENOUS; SUBCUTANEOUS at 20:11

## 2022-06-26 RX ADMIN — TRAMADOL HYDROCHLORIDE 25 MILLIGRAM(S): 50 TABLET ORAL at 23:56

## 2022-06-26 RX ADMIN — TRAMADOL HYDROCHLORIDE 25 MILLIGRAM(S): 50 TABLET ORAL at 07:10

## 2022-06-26 RX ADMIN — Medication 125 MILLIGRAM(S): at 20:12

## 2022-06-26 RX ADMIN — MYCOPHENOLATE MOFETIL 1000 MILLIGRAM(S): 250 CAPSULE ORAL at 08:27

## 2022-06-26 RX ADMIN — Medication 40 MILLIGRAM(S): at 15:04

## 2022-06-26 RX ADMIN — TACROLIMUS 3 MILLIGRAM(S): 5 CAPSULE ORAL at 08:27

## 2022-06-26 RX ADMIN — MUPIROCIN 1 APPLICATION(S): 20 OINTMENT TOPICAL at 06:59

## 2022-06-26 RX ADMIN — TRAMADOL HYDROCHLORIDE 25 MILLIGRAM(S): 50 TABLET ORAL at 13:46

## 2022-06-26 RX ADMIN — Medication 650 MILLIGRAM(S): at 11:47

## 2022-06-26 RX ADMIN — Medication 20 MILLIGRAM(S): at 03:55

## 2022-06-26 RX ADMIN — Medication 650 MILLIGRAM(S): at 17:20

## 2022-06-26 RX ADMIN — HEPARIN SODIUM 5000 UNIT(S): 5000 INJECTION INTRAVENOUS; SUBCUTANEOUS at 06:41

## 2022-06-26 RX ADMIN — MILRINONE LACTATE 2.19 MICROGRAM(S)/KG/MIN: 1 INJECTION, SOLUTION INTRAVENOUS at 20:11

## 2022-06-26 RX ADMIN — Medication 5 MILLIGRAM(S): at 09:41

## 2022-06-26 RX ADMIN — CEFEPIME 100 MILLIGRAM(S): 1 INJECTION, POWDER, FOR SOLUTION INTRAMUSCULAR; INTRAVENOUS at 13:47

## 2022-06-26 RX ADMIN — Medication 650 MILLIGRAM(S): at 06:40

## 2022-06-26 RX ADMIN — GABAPENTIN 100 MILLIGRAM(S): 400 CAPSULE ORAL at 06:40

## 2022-06-26 RX ADMIN — TRAMADOL HYDROCHLORIDE 25 MILLIGRAM(S): 50 TABLET ORAL at 06:41

## 2022-06-26 RX ADMIN — TACROLIMUS 4 MILLIGRAM(S): 5 CAPSULE ORAL at 20:12

## 2022-06-26 RX ADMIN — MYCOPHENOLATE MOFETIL 1000 MILLIGRAM(S): 250 CAPSULE ORAL at 20:12

## 2022-06-26 RX ADMIN — Medication 36 MILLIGRAM(S): at 08:27

## 2022-06-26 RX ADMIN — Medication 650 MILLIGRAM(S): at 17:50

## 2022-06-26 RX ADMIN — TRAMADOL HYDROCHLORIDE 25 MILLIGRAM(S): 50 TABLET ORAL at 21:39

## 2022-06-26 RX ADMIN — MUPIROCIN 1 APPLICATION(S): 20 OINTMENT TOPICAL at 18:24

## 2022-06-26 RX ADMIN — Medication 250 MILLIGRAM(S): at 13:46

## 2022-06-26 RX ADMIN — Medication 200 GRAM(S): at 18:00

## 2022-06-26 NOTE — PROGRESS NOTE ADULT - SUBJECTIVE AND OBJECTIVE BOX
Subjective:    Medications:  acetaminophen     Tablet .. 650 milliGRAM(s) Oral every 6 hours  cefepime   IVPB 1000 milliGRAM(s) IV Intermittent every 8 hours  chlorhexidine 2% Cloths 1 Application(s) Topical <User Schedule>  dextrose 50% Injectable 50 milliLiter(s) IV Push every 15 minutes  furosemide   Injectable 40 milliGRAM(s) IV Push daily  gabapentin 100 milliGRAM(s) Oral two times a day  heparin   Injectable 5000 Unit(s) SubCutaneous every 8 hours  hydrALAZINE 10 milliGRAM(s) Oral every 8 hours  insulin regular Infusion 3 Unit(s)/Hr IV Continuous <Continuous>  methylPREDNISolone sodium succinate Injectable 36 milliGRAM(s) IV Push every 12 hours  milrinone Infusion 0.3 MICROgram(s)/kG/Min IV Continuous <Continuous>  mupirocin 2% Nasal 1 Application(s) Both Nostrils every 12 hours  mycophenolate mofetil 1000 milliGRAM(s) Oral every 12 hours  pantoprazole  Injectable 40 milliGRAM(s) IV Push daily  polyethylene glycol 3350 17 Gram(s) Oral daily  sodium chloride 0.9%. 1000 milliLiter(s) IV Continuous <Continuous>  tacrolimus 3 milliGRAM(s) Oral <User Schedule>  traMADol 25 milliGRAM(s) Oral every 8 hours  vancomycin    Solution 125 milliGRAM(s) Oral every 12 hours  vancomycin  IVPB 750 milliGRAM(s) IV Intermittent every 12 hours      Physical Exam:    Vitals:  Vital Signs Last 24 Hours  T(C): 36.1 (22 @ 08:00), Max: 36.7 (22 @ 16:00)  HR: 96 (22 @ 10:00) (92 - 99)  BP: 122/68 (22 @ 10:00) (118/62 - 134/71)  RR: 19 (22 @ 10:00) (11 - 25)  SpO2: 100% (22 @ 10:00) (98% - 100%)    Weight in k.8 ( @ 00:00)    I&O's Summary    2022 07:01  -  2022 07:00  --------------------------------------------------------  IN: 1393.1 mL / OUT: 3020 mL / NET: -1626.9 mL    2022 07:01  -  2022 10:27  --------------------------------------------------------  IN: 26 mL / OUT: 860 mL / NET: -834 mL        Tele:    General: No distress. Comfortable.  HEENT: EOM intact.  Neck: Neck supple. JVP not elevated. No masses  Chest: Clear to auscultation bilaterally  CV: Normal S1 and S2. No murmurs, rub, or gallops. Radial pulses normal.  Abdomen: Soft, non-distended, non-tender  Skin: No rashes or skin breakdown  Neurology: Alert and oriented times three. Sensation intact  Psych: Affect normal    Labs:                        9.5    10.18 )-----------( 192      ( 2022 01:06 )             27.9         133<L>  |  99  |  50<H>  ----------------------------<  150<H>  4.8   |  25  |  1.77<H>    Ca    8.9      2022 01:06  Phos  3.0       Mg     2.1         TPro  5.8<L>  /  Alb  3.9  /  TBili  0.8  /  DBili  x   /  AST  19  /  ALT  29  /  AlkPhos  47              Oxygen Saturation, Mixed: 67.7 ( @ 10:52)  Oxygen Saturation, Mixed: 75.5 ( @ 03:33)  Oxygen Saturation, Mixed: 80.9 ( @ 00:34)  Oxygen Saturation, Mixed: 75.5 ( @ 19:02)  Oxygen Saturation, Mixed: 75.7 ( @ 16:06)  Oxygen Saturation, Mixed: 76.5 ( @ 12:43)  Oxygen Saturation, Mixed: 74.0 ( @ 06:09)  Oxygen Saturation, Mixed: 77.0 ( @ 01:31)  Oxygen Saturation, Mixed: 76.5 ( @ 22:09)  Oxygen Saturation, Mixed: 68.4 ( @ 17:00)  Oxygen Saturation, Mixed: 73.0 ( @ 14:30)  Oxygen Saturation, Mixed: 61.4 ( @ 12:30)         Subjective:  - milrinone decreased from 0.375 to 0.3 mcg/kg/min  - OOB to chair  - ambulated with team  - oxygen being weaned    Medications:  acetaminophen     Tablet .. 650 milliGRAM(s) Oral every 6 hours  cefepime   IVPB 1000 milliGRAM(s) IV Intermittent every 8 hours  chlorhexidine 2% Cloths 1 Application(s) Topical <User Schedule>  dextrose 50% Injectable 50 milliLiter(s) IV Push every 15 minutes  furosemide   Injectable 40 milliGRAM(s) IV Push daily  gabapentin 100 milliGRAM(s) Oral two times a day  heparin   Injectable 5000 Unit(s) SubCutaneous every 8 hours  hydrALAZINE 10 milliGRAM(s) Oral every 8 hours  insulin regular Infusion 3 Unit(s)/Hr IV Continuous <Continuous>  methylPREDNISolone sodium succinate Injectable 36 milliGRAM(s) IV Push every 12 hours  milrinone Infusion 0.3 MICROgram(s)/kG/Min IV Continuous <Continuous>  mupirocin 2% Nasal 1 Application(s) Both Nostrils every 12 hours  mycophenolate mofetil 1000 milliGRAM(s) Oral every 12 hours  pantoprazole  Injectable 40 milliGRAM(s) IV Push daily  polyethylene glycol 3350 17 Gram(s) Oral daily  sodium chloride 0.9%. 1000 milliLiter(s) IV Continuous <Continuous>  tacrolimus 3 milliGRAM(s) Oral <User Schedule>  traMADol 25 milliGRAM(s) Oral every 8 hours  vancomycin    Solution 125 milliGRAM(s) Oral every 12 hours  vancomycin  IVPB 750 milliGRAM(s) IV Intermittent every 12 hours      Physical Exam:    Vitals:  Vital Signs Last 24 Hours  T(C): 36.1 (22 @ 08:00), Max: 36.7 (22 @ 16:00)  HR: 96 (22 @ 10:00) (92 - 99)  BP: 122/68 (22 @ 10:00) (118/62 - 134/71)  RR: 19 (22 @ 10:00) (11 - 25)  SpO2: 100% (22 @ 10:00) (98% - 100%)    Weight in k.8 ( @ 00:00)    I&O's Summary    2022 07:01  -  2022 07:00  --------------------------------------------------------  IN: 1393.1 mL / OUT: 3020 mL / NET: -1626.9 mL    2022 07:01  -  2022 10:27  --------------------------------------------------------  IN: 26 mL / OUT: 860 mL / NET: -834 mL    Tele: SR    General: No distress. Comfortable.  HEENT: EOM intact.  Neck: Neck supple. JVP moderately elevated. No masses  Chest: Clear to auscultation bilaterally, CTs in place with serosang drainage  CV: Normal S1 and S2. No murmurs, rub, or gallops. Radial pulses normal. +1 BLE edema  Abdomen: Soft, non-distended, non-tender  Skin: No rashes or skin breakdown  Neurology: Alert and oriented times three. Sensation intact  Psych: Affect normal    Labs:                        9.5    10.18 )-----------( 192      ( 2022 01:06 )             27.9         133<L>  |  99  |  50<H>  ----------------------------<  150<H>  4.8   |  25  |  1.77<H>    Ca    8.9      2022 01:06  Phos  3.0       Mg     2.1         TPro  5.8<L>  /  Alb  3.9  /  TBili  0.8  /  DBili  x   /  AST  19  /  ALT  29  /  AlkPhos  47              Oxygen Saturation, Mixed: 67.7 ( @ 10:52)  Oxygen Saturation, Mixed: 75.5 ( @ 03:33)  Oxygen Saturation, Mixed: 80.9 ( @ 00:34)  Oxygen Saturation, Mixed: 75.5 ( @ 19:02)  Oxygen Saturation, Mixed: 75.7 ( @ 16:06)  Oxygen Saturation, Mixed: 76.5 ( @ 12:43)  Oxygen Saturation, Mixed: 74.0 ( @ 06:09)  Oxygen Saturation, Mixed: 77.0 ( @ 01:31)  Oxygen Saturation, Mixed: 76.5 ( @ 22:09)  Oxygen Saturation, Mixed: 68.4 ( @ 17:00)  Oxygen Saturation, Mixed: 73.0 ( @ 14:30)  Oxygen Saturation, Mixed: 61.4 ( @ 12:30)

## 2022-06-26 NOTE — PROGRESS NOTE ADULT - PROBLEM SELECTOR PLAN 1
Pt. with hemodynamically mediated JAGRUTI after OHT, in the setting of renal venous congestion. Upon review of labs, Scr was 1.2 on 4/19/22 and was elevated at 1.8 on 5/24/22. SCr downtrended to 1.01 on 6/20/22. Pt underwent OHT on 6/21/22 and required IABP post operatively. Scr uptrended to 1.23 on 6/23/22 with elevated PA pressures. SCr increased to 1.97. UA done on 6/8/22 showed trace blood and proteinuria.    Today Cr is stable/elevated to 1.7mg/dl, non oliguric with ~3L urine output. Continue IV diuretics 40mg IV day, titrate to keep CVP < 10. Labs reviewed. On milrinone gtt. No urgent indication for RRT. Monitor labs and urine output. Pt. with hemodynamically mediated JAGRUTI after OHT, in the setting of renal venous congestion. Upon review of labs, Scr was 1.2 on 4/19/22 and was elevated at 1.8 on 5/24/22. SCr downtrended to 1.01 on 6/20/22. Pt underwent OHT on 6/21/22 and required IABP post operatively. Scr uptrended to 1.23 on 6/23/22 with elevated PA pressures. SCr increased to 1.97. UA done on 6/8/22 showed trace blood and proteinuria.    Today Cr is decreased to 1.7mg/dl, non oliguric with ~3L urine output. Continue IV diuretics, titrate to keep CVP < 10. Labs reviewed. On milrinone gtt. No urgent indication for RRT. Monitor labs and urine output. Repeat UA and spot urine TP/Cr.

## 2022-06-26 NOTE — PROGRESS NOTE ADULT - SUBJECTIVE AND OBJECTIVE BOX
CRITICAL CARE ATTENDING - CTICU    MEDICATIONS  (STANDING):  acetaminophen     Tablet .. 650 milliGRAM(s) Oral every 6 hours  cefepime   IVPB 1000 milliGRAM(s) IV Intermittent every 8 hours  chlorhexidine 2% Cloths 1 Application(s) Topical <User Schedule>  dextrose 50% Injectable 50 milliLiter(s) IV Push every 15 minutes  furosemide   Injectable 40 milliGRAM(s) IV Push daily  gabapentin 100 milliGRAM(s) Oral two times a day  heparin   Injectable 5000 Unit(s) SubCutaneous every 8 hours  hydrALAZINE 10 milliGRAM(s) Oral every 8 hours  insulin regular Infusion 3 Unit(s)/Hr (3 mL/Hr) IV Continuous <Continuous>  methylPREDNISolone sodium succinate Injectable 36 milliGRAM(s) IV Push every 12 hours  milrinone Infusion 0.3 MICROgram(s)/kG/Min (6.56 mL/Hr) IV Continuous <Continuous>  mupirocin 2% Nasal 1 Application(s) Both Nostrils every 12 hours  mycophenolate mofetil 1000 milliGRAM(s) Oral every 12 hours  pantoprazole  Injectable 40 milliGRAM(s) IV Push daily  polyethylene glycol 3350 17 Gram(s) Oral daily  sodium chloride 0.9%. 1000 milliLiter(s) (10 mL/Hr) IV Continuous <Continuous>  tacrolimus 3 milliGRAM(s) Oral <User Schedule>  traMADol 25 milliGRAM(s) Oral every 8 hours  vancomycin    Solution 125 milliGRAM(s) Oral every 12 hours  vancomycin  IVPB 750 milliGRAM(s) IV Intermittent every 12 hours                            9.5    10.18 )-----------( 192      ( 2022 01:06 )             27.9           133<L>  |  99  |  50<H>  ----------------------------<  150<H>  4.8   |  25  |  1.77<H>    Ca    8.9      2022 01:06  Phos  3.0       Mg     2.1         TPro  5.8<L>  /  Alb  3.9  /  TBili  0.8  /  DBili  x   /  AST  19  /  ALT  29  /  AlkPhos  47  -              Daily     Daily Weight in k.8 (2022 00:00)      06-24 @ 07:01  -  06-25 @ 07:00  --------------------------------------------------------  IN: 1831 mL / OUT: 3445 mL / NET: -1614 mL     @ 07:01   @ 06:33  --------------------------------------------------------  IN: 1305.9 mL / OUT: 2615 mL / NET: -1309.1 mL      Critically Ill patient  : [ ] preoperative ,   [x] post operative    Requires :  [x] Arterial Line   [x] Central Line  [x ] PA catheter  [ ] IABP  [ ] ECMO  [ ] LVAD  [ ] Ventilator  [x] pacemaker- TPM [ ] Impella                       [x ] ABG's     [ x] Pulse Oxymetry Monitoring  Bedside evaluation , monitoring , treatment of hemodynamics , fluids , IVP/ IVCD meds.        Diagnosis:     POD 5- Heart Transplant  & AICD removal     Immunosuppressive therapy     Cardiogenic Shock - post op - resolving      CHF- acute [ x]   chronic [ ]    systolic [ x]   diatolic [x ]          - Echo- EF -  post op           [ ] RV dysfunction          - Cxr-cardiomegally, edema          - Clinical-  [ x]inotropes   [ ]pressors   [x ]diuresis   [ ]IABP   [ ]ECMO   [ ]LVAD   [ ] ventilator     Chest tube drainage     Requires chest PT, pulmonary toilet, suctioning to maintain SaO2,  patent airway and treat atelectasis.     IABP   [x ] removed on   and f/u vascular checks      Temporary pacemaker (TPM) interrogation and setting.     Hemodynamic lability,  instability. Requires IVCD [ ] vasopressors [x] inotropes  [ ] vasodilator  [x] IVSS fluid  to maintain MAP, perfusion, C.I.     Hypotension     DM2- IVCD insulin     Renal Failure - Acute Kidney Injury / CKD     Fluid  overload     Requires bedside physical therapy, mobilization and total retirement care.                 By signing my name below, I, Minerva Whalen, attest that this documentation has been prepared under the direction and in the presence of Julius Kinney MD.   Electronically Signed: Mini Roberts -- @ 06:33      Discussed with CT surgeon, Physician Assistant - Nurse Practitioner- Critical care medicine team.   Discussed at  AM / PM rounds.   Chart, labs , films reviewed.    Cumulative Critical Care Time Given Today:  CRITICAL CARE ATTENDING - CTICU    MEDICATIONS  (STANDING):  acetaminophen     Tablet .. 650 milliGRAM(s) Oral every 6 hours  cefepime   IVPB 1000 milliGRAM(s) IV Intermittent every 8 hours  chlorhexidine 2% Cloths 1 Application(s) Topical <User Schedule>  dextrose 50% Injectable 50 milliLiter(s) IV Push every 15 minutes  furosemide   Injectable 40 milliGRAM(s) IV Push daily  gabapentin 100 milliGRAM(s) Oral two times a day  heparin   Injectable 5000 Unit(s) SubCutaneous every 8 hours  hydrALAZINE 10 milliGRAM(s) Oral every 8 hours  insulin regular Infusion 3 Unit(s)/Hr (3 mL/Hr) IV Continuous <Continuous>  methylPREDNISolone sodium succinate Injectable 36 milliGRAM(s) IV Push every 12 hours  milrinone Infusion 0.3 MICROgram(s)/kG/Min (6.56 mL/Hr) IV Continuous <Continuous>  mupirocin 2% Nasal 1 Application(s) Both Nostrils every 12 hours  mycophenolate mofetil 1000 milliGRAM(s) Oral every 12 hours  pantoprazole  Injectable 40 milliGRAM(s) IV Push daily  polyethylene glycol 3350 17 Gram(s) Oral daily  sodium chloride 0.9%. 1000 milliLiter(s) (10 mL/Hr) IV Continuous <Continuous>  tacrolimus 3 milliGRAM(s) Oral <User Schedule>  traMADol 25 milliGRAM(s) Oral every 8 hours  vancomycin    Solution 125 milliGRAM(s) Oral every 12 hours  vancomycin  IVPB 750 milliGRAM(s) IV Intermittent every 12 hours                            9.5    10.18 )-----------( 192      ( 2022 01:06 )             27.9           133<L>  |  99  |  50<H>  ----------------------------<  150<H>  4.8   |  25  |  1.77<H>    Ca    8.9      2022 01:06  Phos  3.0       Mg     2.1         TPro  5.8<L>  /  Alb  3.9  /  TBili  0.8  /  DBili  x   /  AST  19  /  ALT  29  /  AlkPhos  47  -              Daily     Daily Weight in k.8 (2022 00:00)      06-24 @ 07:01  -  06-25 @ 07:00  --------------------------------------------------------  IN: 1831 mL / OUT: 3445 mL / NET: -1614 mL     @ 07:01   @ 06:33  --------------------------------------------------------  IN: 1305.9 mL / OUT: 2615 mL / NET: -1309.1 mL      Critically Ill patient  : [ ] preoperative ,   [x] post operative    Requires :  [x] Arterial Line   [x] Central Line  [ ] PA catheter  [ ] IABP  [ ] ECMO  [ ] LVAD  [ ] Ventilator  [x] pacemaker- TPM [ ] Impella                       [x ] ABG's     [ x] Pulse Oxymetry Monitoring  Bedside evaluation , monitoring , treatment of hemodynamics , fluids , IVP/ IVCD meds.        Diagnosis:     POD 5- Heart Transplant  & AICD removal     Immunosuppressive therapy     Cardiogenic Shock - post op - resolving      CHF- acute [ x]   chronic [ ]    systolic [ x]   diatolic [x ]          - Echo- EF -  post op           [ ] RV dysfunction          - Cxr-cardiomegally, edema          - Clinical-  [ x]inotropes   [ ]pressors   [x ]diuresis   [ ]IABP   [ ]ECMO   [ ]LVAD   [ ] ventilator     Chest tube drainage     Requires chest PT, pulmonary toilet, suctioning to maintain SaO2,  patent airway and treat atelectasis.     IABP   [x ] removed on   and f/u vascular checks      Temporary pacemaker (TPM) interrogation and setting.     Hemodynamic lability,  instability. Requires IVCD [ ] vasopressors [x] inotropes  [ ] vasodilator  [x] IVSS fluid  to maintain MAP, perfusion, C.I.     Hypotension     DM2- IVCD insulin     Renal Failure - Acute Kidney Injury / CKD     Fluid  overload     Requires bedside physical therapy, mobilization and total [facility] care.                 By signing my name below, I, Minerva Whalen, attest that this documentation has been prepared under the direction and in the presence of Julius Kinney MD.   Electronically Signed: Mini oRberts 22 @ 06:33    I, Julius Kinney, personally performed the services described in this documentation. All medical record entries made by the scribe were at my direction and in my presence. I have reviewed the chart and agree that the record reflects my personal performance and is accurate and complete.   Julius Kinney MD.       Discussed with CT surgeon, Physician Assistant - Nurse Practitioner- Critical care medicine team.   Discussed at  AM / PM rounds.   Chart, labs , films reviewed.    Cumulative Critical Care Time Given Today:  30 min

## 2022-06-26 NOTE — PROGRESS NOTE ADULT - ASSESSMENT
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 LHC did not reveal new obstructive CAD and he underwent EPS which did not reveal endocardial substrate amenable for ablation. RHC revealed severely depressed cardiac output and he was transferred to [facility] 5/26 for advanced therapies evaluation.    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 He improved significantly with sequential uptitration of inotropes and increased pacing rate, but on 6/6 he had worsening JAGRUTI. He is s/p RHC which revealed severely elevated filling pressures, severe cpcPH, and CI of 1.9 on milrinone 0.5. IABP was placed and his renal function/PAPs have improved. He is MCS dependent and was inadequately supported with high dose inotropes, so was listed UNOS status 2e for OHT, awaiting suitable donor. However, was made status 7 as he became febrile, last 6/7 T 38. He has been afebrile since and blood cultures from 6/7 with NGTD x 48 hours and his leukocytosis has not worsened. Discussed with transplant ID and since bld cx negative x48hrs he has been re-listed UNOS status 2e.     A suitable donor was identified and patient underwent OHT with Dr. Earl/Maria C on 6/21 (ischemic time 261 mins, CMV +/+, Toxo -/-). Patient was successfully extubated and IABP was removed on POD 1. Off Maricruz. He's overall progressing well. Volume overloaded on exam. Cr downtrending. Leukocytosis improving, he's afebrile. Plan to RHC/EMBx Tuesday 6/28.     Primary cardiologist: Ari Wagner MD    Review of studies  TTE 5/21: LV 5.2 cm, LVEF 10-15%, LVOT VTI 10 cm, moderate RV dysfunction, mild AI, minimal MR  EKG: a-BiV paced  C 5/23: patent mLAD stent with slow flow, D1 with 40-50% stenosis, mild disease otherwise  RHC 5/26: RA 12, PA 70/40 (50), PCWP 22, Milana CO/CI 2.3/1.3, MAP 83 with SVR 2469, PVR 12 PERSAUD    Hemodynamics  5/27 (milrinone 0.25): RA 10, PA 76/35 (49), PCWP 34, PA sat 57% with Milana CO/CI 3.1/1.6, MAP 71 with SVR 1574, PVR 4.8  5/28 (on milrinone 0.375): RA 7, PA 63/31, PCWP 26, PA sat 72.8% with Milana CO/CI 4.9/2.5 (CI later dropped to 1.6 in the afternoon), MAP 70 with SVR 1039, PVR 2.9  5/29 (on milrinone 0.5): RA 8, PA 75/32 (50), PCWP 28, PA sat 74% with Milana CO/CI 5.0/2.6, MAP 77 with SVR 1200, PVR 4.4  5/29 (on milrinone 0.5 and nipride to 3 mcg/kg/min): RA 6, PA 59/31 (40), PCWP 30, PA sat 79% with Milana CO/CI 7.0/3.6, BP 97/57 (MAP 70) with , PVR 1.4  6/6 RHC (mil 0.5): RA 11 (v13), RV 75/17, PA 80/36/52, PCWP 24 (v29), PA sat 59.5%, CO/CI (F) 3.58/1.88  6/7 (mil 0.5, IABP 1:1): CVP 5, PA 66/32/45, PA sat 65.7%, CO/CI (F) 4.0/2.1, SVR 2000  6/8 (mil 0.5, IABP 1:1): CVP 1, PA 46/19/28, PA sat 72.7%, CO/CI (F) 5.6/2.9, SVR 1257  6/8 PM (mil 0.5, IABP 1:1): CVP 4, PA 68/25/38, PA sat 68%, CO/CI (f) 5/2.6, SVR 1326

## 2022-06-26 NOTE — PROGRESS NOTE ADULT - PROBLEM SELECTOR PLAN 4
- Cr downtrending  - diuresis as needed as stated above  - will slowly increase prograf to goal  - cardiorenal consulted, appreciate recommendations

## 2022-06-26 NOTE — PROGRESS NOTE ADULT - ASSESSMENT
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 5/20 with near syncope in setting of worsening HF symptoms and found to have 41 episodes of VT, many terminating with ATP. LHC did not reveal new obstructive CAD and he underwent EPS which did not reveal endocardial substrate amenable for ablation. RHC revealed severely depressed cardiac output and he was transferred to [facility] 5/26 for advanced therapies evaluation.    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 He improved significantly with sequential uptitration of inotropes and increased pacing rate, but on 6/6 he had worsening JAGRUTI. He is s/p RHC which revealed severely elevated filling pressures, severe cpcPH, and CI of 1.9 on milrinone 0.5. IABP was placed and his renal function/PAPs have improved. He is MCS dependent and was inadequately supported with high dose inotropes, so was listed UNOS status 2e for OHT, awaiting suitable donor. However, was made status 7 as he became febrile, last 6/7 T 38. He has been afebrile since and blood cultures from 6/7 with NGTD x 48 hours and his leukocytosis has not worsened. Discussed with transplant ID and since bld cx negative x48hrs he has been re-listed UNOS status 2e.     A suitable donor was identified and patient underwent OHT with Dr. Earl/Maria C on 6/21 (ischemic time 261 mins, CMV +/+, Toxo -/-). On POD 1 IABP was removed. Extubated afternoon 6/22    #Positive Culture Finding (Staph epi and Morganella)  Only in liquid media so could be contaminant but reasonable to cover given murky fluid noted around pocket  --Continue Cefepime and Vancomycin  --Follow susceptibilities of Staph epi and Morganella  --Follow WBC trend  --Follow temperature curve    #Prophylactic Antibiotics  Vancomycin plus Cefepime completed on 6/24  On PO Vancomycin    CMV:  intermediate risk  will eventually need valcyte once electrolytes and renal function stabilizes    toxo  low risk    PJP  will eventually need Bactrim or Mepron    thrush:  greyson Zaldivar M.D.  [facility] Division of Infectious Disease  8AM-5PM Monday - Friday: Available on [employer] Teams  After Hours and Holidays (or if no response on [employer] Teams): Please contact the Infectious Diseases Office at [phone]

## 2022-06-26 NOTE — PROGRESS NOTE ADULT - SUBJECTIVE AND OBJECTIVE BOX
[facility] DIVISION OF KIDNEY DISEASES AND HYPERTENSION -- FOLLOW UP NOTE  --------------------------------------------------------------------------------  If any questions, please feel free to contact me  NS pager: [phone], [facility]: [phone]  Ranjeet Guillen M.D.  Nephrology Fellow  -------------------------------------------------------------------------------    HPI:  66M male with PMHx HTN, HLD, type 2 DM, chronic HFrEF,  (EF 25-30% by Echo) underwent OHT on 6/21/22. Nephrology consulted for JAGRUTI.     Upon review of labs, Scr was 1.2 on 4/19/22 and was elevated at 1.8 on 5/24/22. SCr downtrended to 1.01 on 6/20/22. Pt underwent OHT on 6/21/22 and required IABP post operatively. Scr uptrended to 1.23 on 6/23/22 with elevated PA pressures. SCr increased to 1.97. Pt currently on diuretics and non oliguric. UA done on 6/8/22 showed trace blood and proteinuria. Today Cr stable/elevated to 1.7mg/dl.     Pt. seen and examined in CTU, sitting comfortably in chair, non oliguric with UOP: ~3L in the past 24hrs. Vitals/labs/imaging reviewed      PAST HISTORY  --------------------------------------------------------------------------------  No significant changes to PMH, PSH, FHx, SHx, unless otherwise noted    ALLERGIES & MEDICATIONS  --------------------------------------------------------------------------------  Allergies    No Known Allergies    Intolerances      Standing Inpatient Medications  acetaminophen     Tablet .. 650 milliGRAM(s) Oral every 6 hours  cefepime   IVPB 1000 milliGRAM(s) IV Intermittent every 8 hours  chlorhexidine 2% Cloths 1 Application(s) Topical <User Schedule>  dextrose 50% Injectable 50 milliLiter(s) IV Push every 15 minutes  furosemide   Injectable 40 milliGRAM(s) IV Push daily  gabapentin 100 milliGRAM(s) Oral two times a day  heparin   Injectable 5000 Unit(s) SubCutaneous every 8 hours  hydrALAZINE 10 milliGRAM(s) Oral every 8 hours  insulin regular Infusion 3 Unit(s)/Hr IV Continuous <Continuous>  methylPREDNISolone sodium succinate Injectable 36 milliGRAM(s) IV Push every 12 hours  milrinone Infusion 0.3 MICROgram(s)/kG/Min IV Continuous <Continuous>  mupirocin 2% Nasal 1 Application(s) Both Nostrils every 12 hours  mycophenolate mofetil 1000 milliGRAM(s) Oral every 12 hours  pantoprazole  Injectable 40 milliGRAM(s) IV Push daily  polyethylene glycol 3350 17 Gram(s) Oral daily  sodium chloride 0.9%. 1000 milliLiter(s) IV Continuous <Continuous>  tacrolimus 3 milliGRAM(s) Oral <User Schedule>  traMADol 25 milliGRAM(s) Oral every 8 hours  vancomycin    Solution 125 milliGRAM(s) Oral every 12 hours  vancomycin  IVPB 750 milliGRAM(s) IV Intermittent every 12 hours    PRN Inpatient Medications      REVIEW OF SYSTEMS  --------------------------------------------------------------------------------  Gen: +fatigue  No fevers/chills  Skin: No rashes  Head/Eyes/Ears: Normal hearing,   Respiratory: No dyspnea, cough  CV: No chest pain  GI: No abdominal pain, diarrhea  : No dysuria, hematuria  MSK: +shoulder pain-  +edema      All other systems were reviewed and are negative, except as noted.    VITALS/PHYSICAL EXAM  --------------------------------------------------------------------------------  T(C): 36.1 (06-26-22 @ 08:00), Max: 36.7 (06-25-22 @ 16:00)  HR: 96 (06-26-22 @ 10:00) (92 - 99)  BP: 122/68 (06-26-22 @ 10:00) (118/62 - 134/71)  RR: 19 (06-26-22 @ 10:00) (11 - 25)  SpO2: 100% (06-26-22 @ 10:00) (98% - 100%)  Wt(kg): --        06-25-22 @ 07:01  -  06-26-22 @ 07:00  --------------------------------------------------------  IN: 1393.1 mL / OUT: 3020 mL / NET: -1626.9 mL    06-26-22 @ 07:01  -  06-26-22 @ 10:10  --------------------------------------------------------  IN: 28.3 mL / OUT: 860 mL / NET: -831.7 mL        Physical Exam:  	Gen: NAD  	HEENT: Anicteric  	Pulm: CTA B/L  	CV: S1S2+, midline sx scar, dressing +  	Abd: Soft, +BS    	Ext: LE edema B/L+  	Neuro: Awake              : Hernandez+ with clear urine in the bag  	Skin: Warm and dry        LABS/STUDIES  --------------------------------------------------------------------------------              9.5    10.18 >-----------<  192      [06-26-22 @ 01:06]              27.9     133  |  99  |  50  ----------------------------<  150      [06-26-22 @ 01:06]  4.8   |  25  |  1.77        Ca     8.9     [06-26-22 @ 01:06]      Mg     2.1     [06-26-22 @ 01:06]      Phos  3.0     [06-26-22 @ 01:06]    TPro  5.8  /  Alb  3.9  /  TBili  0.8  /  DBili  x   /  AST  19  /  ALT  29  /  AlkPhos  47  [06-26-22 @ 01:06]          Creatinine Trend:  SCr 1.77 [06-26 @ 01:06]  SCr 1.84 [06-25 @ 00:39]  SCr 1.92 [06-24 @ 01:37]  SCr 1.27 [06-23 @ 00:52]  SCr 1.10 [06-22 @ 01:10]    Urinalysis - [06-08-22 @ 00:26]      Color Light Yellow / Appearance Clear / SG 1.014 / pH 6.0      Gluc Negative / Ketone Negative  / Bili Negative / Urobili Negative       Blood Trace / Protein Trace / Leuk Est Negative / Nitrite Negative      RBC 1 / WBC 0 / Hyaline 0 / Gran  / Sq Epi  / Non Sq Epi 0 / Bacteria Negative      Iron 26, TIBC 295, %sat 9      [05-27-22 @ 17:02]  Ferritin 217      [05-27-22 @ 17:02]  TSH 0.57      [06-22-22 @ 21:13]  Lipid: chol 159, , HDL 29, LDL --      [05-20-22 @ 14:17]    HBsAb Nonreact      [05-27-22 @ 17:23]  HBsAg Nonreact      [05-27-22 @ 17:23]  HBcAb Nonreact      [05-27-22 @ 17:23]  HIV Nonreact      [05-27-22 @ 15:19]    MELIZA: titer 1:320, pattern Homogeneous      [05-27-22 @ 17:18]  Rheumatoid Factor 13      [05-27-22 @ 17:02]  Syphilis Screen (Treponema Pallidum Ab) Negative      [06-02-22 @ 21:12]  Free Light Chains: kappa 3.64, lambda 2.71, ratio = 1.34      [05-27 @ 17:20]  Immunofixation Serum: No Monoclonal Band Identified    Reference Range: None Detected      [05-27-22 @ 17:20]  SPEP Interpretation: Normal Electrophoresis Pattern      [05-27-22 @ 17:20]  Immunofixation Urine:   Reference Range: None Detected      [06-05-22 @ 12:15]  UPEP Interpretation: Normal Electrophoresis Pattern      [06-05-22 @ 12:15]

## 2022-06-26 NOTE — PROGRESS NOTE ADULT - PROBLEM SELECTOR PLAN 3
- CMV +/+, will be started on Valcyte when feasible   - Toxo -/-, no ppx needed, will start bactrim for PJP ppx when appropriate  - periop antibiotics per CTS

## 2022-06-26 NOTE — PROGRESS NOTE ADULT - CRITICAL CARE ATTENDING COMMENT
excellent progress. ambulating.   off NO, reducing doses of milrinone.   meds: mirlinone .3, cefipime 1g Q8, vanco IV, PO vanco, HDZN10 tid, lasix 40 IV prn, prograf 3/3 (pending, 4.5) cellcept 1g bid, solumedrol taper.   HR 90, afeb, 120/-130/ 3L   06-26  133<L>  |  99  |  50<H>  ----------------------------<  150<H>  4.8   |  25  |  1.77<H>  Cr 1.77, 1.84, 1.9    Ca    8.9      26 Jun 2022 01:06  Phos  3.0     06-26  Mg     2.1     06-26                        9.5    10.18 )-----------( 192      ( 26 Jun 2022 01:06 )             27.9     TPro  5.8<L>  /  Alb  3.9  /  TBili  0.8  /  DBili  x   /  AST  19  /  ALT  29  /  AlkPhos  47  06-26  ICD pocket growing gram -Morganella   Discussed on MDR. Still with evidence of mild volume overload.   Please follow daily standing weights  make lasix 40 IV bid. albumin X1 with first dose  change HDZN to procardia XL 60  wean milrinone to 0.2  Adjust prograf by level   Dr Zaldivar to adjust abs to speciation   Dany Dominique

## 2022-06-26 NOTE — PROGRESS NOTE ADULT - SUBJECTIVE AND OBJECTIVE BOX
Follow Up:      Interval History:    REVIEW OF SYSTEMS  [  ] ROS unobtainable because:    [  ] All other systems negative except as noted below    Constitutional:  [ ] fever [ ] chills  [ ] weight loss  [ ] weakness  Skin:  [ ] rash [ ] phlebitis	  Eyes: [ ] icterus [ ] pain  [ ] discharge	  ENMT: [ ] sore throat  [ ] thrush [ ] ulcers [ ] exudates  Respiratory: [ ] dyspnea [ ] hemoptysis [ ] cough [ ] sputum	  Cardiovascular:  [ ] chest pain [ ] palpitations [ ] edema	  Gastrointestinal:  [ ] nausea [ ] vomiting [ ] diarrhea [ ] constipation [ ] pain	  Genitourinary:  [ ] dysuria [ ] frequency [ ] hematuria [ ] discharge [ ] flank pain  [ ] incontinence  Musculoskeletal:  [ ] myalgias [ ] arthralgias [ ] arthritis  [ ] back pain  Neurological:  [ ] headache [ ] seizures  [ ] confusion/altered mental status    Allergies  No Known Allergies        ANTIMICROBIALS:  cefepime   IVPB 1000 every 8 hours  vancomycin    Solution 125 every 12 hours  vancomycin  IVPB 750 every 12 hours      OTHER MEDS:  MEDICATIONS  (STANDING):  acetaminophen     Tablet .. 650 every 6 hours  dextrose 50% Injectable 50 every 15 minutes  furosemide   Injectable 40 two times a day  gabapentin 100 two times a day  heparin   Injectable 5000 every 8 hours  hydrALAZINE 10 every 8 hours  insulin regular Infusion 3 <Continuous>  methylPREDNISolone sodium succinate Injectable 36 every 12 hours  milrinone Infusion 0.2 <Continuous>  mycophenolate mofetil 1000 every 12 hours  NIFEdipine XL 60 daily  polyethylene glycol 3350 17 daily  tacrolimus 4 <User Schedule>  traMADol 25 every 8 hours      Vital Signs Last 24 Hrs  T(C): 36.2 (26 Jun 2022 12:00), Max: 36.5 (25 Jun 2022 20:00)  T(F): 97.2 (26 Jun 2022 12:00), Max: 97.7 (25 Jun 2022 20:00)  HR: 104 (26 Jun 2022 16:00) (93 - 104)  BP: 123/72 (26 Jun 2022 12:00) (118/62 - 130/77)  BP(mean): 92 (26 Jun 2022 12:00) (85 - 99)  RR: 32 (26 Jun 2022 16:00) (12 - 32)  SpO2: 93% (26 Jun 2022 16:00) (93% - 100%)    PHYSICAL EXAMINATION:  General: Alert and Awake, NAD  HEENT: PERRL, EOMI  Neck: Supple  Cardiac: RRR, No M/R/G  Resp: CTAB, No Wh/Rh/Ra  Abdomen: NBS, NT/ND, No HSM, No rigidity or guarding  MSK: No LE edema. No Calf tenderness  : No block  Skin: No rashes or lesions. Skin is warm and dry to the touch.   Neuro: Alert and Awake. CN 2-12 Grossly intact. Moves all four extremities spontaneously.  Psych: Calm, Pleasant, Cooperative                          9.5    10.18 )-----------( 192      ( 26 Jun 2022 01:06 )             27.9       06-26    133<L>  |  99  |  50<H>  ----------------------------<  150<H>  4.8   |  25  |  1.77<H>    Ca    8.9      26 Jun 2022 01:06  Phos  3.0     06-26  Mg     2.1     06-26    TPro  5.8<L>  /  Alb  3.9  /  TBili  0.8  /  DBili  x   /  AST  19  /  ALT  29  /  AlkPhos  47  06-26          MICROBIOLOGY:  Vancomycin Level, Trough: 16.8 ug/mL (06-26-22 @ 12:31)  Vancomycin Level, Trough: 13.8 ug/mL (06-25-22 @ 23:13)  v  .Tissue Other  06-21-22   Culture is being performed.  --    Few polymorphonuclear leukocytes seen per low power field  No organisms seen per oil power field      .Blood Blood  06-07-22   No Growth Final  --  --      .Blood Blood-Peripheral  06-02-22   No Growth Final  --  --      .Blood Blood-Peripheral  06-02-22   No Growth Final  --  --      .Blood Blood  05-28-22   No Growth Final  --  --        CMV IgG Antibody: >10.00 U/mL (05-27-22 @ 17:18)  Toxoplasma IgG Screen: <3.0 IU/mL (05-27-22 @ 17:18)  HIV-1 RNA Quantitative, Viral Load Log: NOT DET. lg /mL (05-27-22 @ 15:30)          RADIOLOGY:    <The imaging below has been reviewed and visualized by me independently. Findings as detailed in report below> Follow Up:  s/p OHT, Positive AICD Pocket cultures    Interval History: afebrile. no acute overnight events.     REVIEW OF SYSTEMS  [  ] ROS unobtainable because:    [ x ] All other systems negative except as noted below    Constitutional:  [ ] fever [ ] chills  [ ] weight loss  [ ] weakness  Skin:  [ ] rash [ ] phlebitis	  Eyes: [ ] icterus [ ] pain  [ ] discharge	  ENMT: [ ] sore throat  [ ] thrush [ ] ulcers [ ] exudates  Respiratory: [ ] dyspnea [ ] hemoptysis [ ] cough [ ] sputum	  Cardiovascular:  [ ] chest pain [ ] palpitations [ ] edema	  Gastrointestinal:  [ ] nausea [ ] vomiting [ ] diarrhea [ ] constipation [ ] pain	  Genitourinary:  [ ] dysuria [ ] frequency [ ] hematuria [ ] discharge [ ] flank pain  [ ] incontinence  Musculoskeletal:  [ ] myalgias [ ] arthralgias [ ] arthritis  [ ] back pain  Neurological:  [ ] headache [ ] seizures  [ ] confusion/altered mental status    Allergies  No Known Allergies        ANTIMICROBIALS:  cefepime   IVPB 1000 every 8 hours  vancomycin    Solution 125 every 12 hours  vancomycin  IVPB 750 every 12 hours      OTHER MEDS:  MEDICATIONS  (STANDING):  acetaminophen     Tablet .. 650 every 6 hours  dextrose 50% Injectable 50 every 15 minutes  furosemide   Injectable 40 two times a day  gabapentin 100 two times a day  heparin   Injectable 5000 every 8 hours  hydrALAZINE 10 every 8 hours  insulin regular Infusion 3 <Continuous>  methylPREDNISolone sodium succinate Injectable 36 every 12 hours  milrinone Infusion 0.2 <Continuous>  mycophenolate mofetil 1000 every 12 hours  NIFEdipine XL 60 daily  polyethylene glycol 3350 17 daily  tacrolimus 4 <User Schedule>  traMADol 25 every 8 hours      Vital Signs Last 24 Hrs  T(C): 36.2 (26 Jun 2022 12:00), Max: 36.5 (25 Jun 2022 20:00)  T(F): 97.2 (26 Jun 2022 12:00), Max: 97.7 (25 Jun 2022 20:00)  HR: 104 (26 Jun 2022 16:00) (93 - 104)  BP: 123/72 (26 Jun 2022 12:00) (118/62 - 130/77)  BP(mean): 92 (26 Jun 2022 12:00) (85 - 99)  RR: 32 (26 Jun 2022 16:00) (12 - 32)  SpO2: 93% (26 Jun 2022 16:00) (93% - 100%)    PHYSICAL EXAMINATION:  General: Alert and Awake, NAD  HEENT: PERRL, EOMI  Chest; Dressing over sternotomy. dressing over the left AICD, minimal soreness to palpation   Cardiac: RRR, No M/R/G  Resp: CTAB, No Wh/Rh/Ra  Abdomen: NBS, NT/ND, No HSM, No rigidity or guarding  MSK: No LE edema. No Calf tenderness  Skin: No rashes or lesions. Skin is warm and dry to the touch.   Neuro: Alert and Awake. CN 2-12 Grossly intact. Moves all four extremities spontaneously.  Psych: Calm, Pleasant, Cooperative                          9.5    10.18 )-----------( 192      ( 26 Jun 2022 01:06 )             27.9       06-26    133<L>  |  99  |  50<H>  ----------------------------<  150<H>  4.8   |  25  |  1.77<H>    Ca    8.9      26 Jun 2022 01:06  Phos  3.0     06-26  Mg     2.1     06-26    TPro  5.8<L>  /  Alb  3.9  /  TBili  0.8  /  DBili  x   /  AST  19  /  ALT  29  /  AlkPhos  47  06-26          MICROBIOLOGY:  Vancomycin Level, Trough: 16.8 ug/mL (06-26-22 @ 12:31)  Vancomycin Level, Trough: 13.8 ug/mL (06-25-22 @ 23:13)  v  .Tissue Other  06-21-22   Culture is being performed.  --    Few polymorphonuclear leukocytes seen per low power field  No organisms seen per oil power field      .Blood Blood  06-07-22   No Growth Final  --  --      .Blood Blood-Peripheral  06-02-22   No Growth Final  --  --      .Blood Blood-Peripheral  06-02-22   No Growth Final  --  --      .Blood Blood  05-28-22   No Growth Final  --  --        CMV IgG Antibody: >10.00 U/mL (05-27-22 @ 17:18)  Toxoplasma IgG Screen: <3.0 IU/mL (05-27-22 @ 17:18)  HIV-1 RNA Quantitative, Viral Load Log: NOT DET. lg /mL (05-27-22 @ 15:30)          RADIOLOGY:    <The imaging below has been reviewed and visualized by me independently. Findings as detailed in report below>    < from: Xray Chest 1 View- PORTABLE-Routine (Xray Chest 1 View- PORTABLE-Routine in AM.) (06.26.22 @ 02:29) >  IMPRESSION:  Bibasilar atelectasis    < end of copied text >

## 2022-06-26 NOTE — PROGRESS NOTE ADULT - PROBLEM SELECTOR PLAN 1
- s/p OHT with Dr. Earl/Maria C on 6/21 (ischemic time 261 min)  - IABP removed on POD 1  - decrease milrinone to 0.2 mcg/kg/min  - increase lasix to 40mg IV BID. albumin 100ml x 1 today  - remove block, TOV  - stop hydralazine and start procardia 60mg daily  - plan for RHC/EMBx Tuesday 6/28

## 2022-06-26 NOTE — PROGRESS NOTE ADULT - PROBLEM SELECTOR PLAN 2
- currently on tacro 3mg Q12 will follow up level and will adjust tacro as needed for goal 12-14  - solumedrol taper per protocol to be resumed   - remains on cellcept 1000 mg IV BID

## 2022-06-27 DIAGNOSIS — D72.829 ELEVATED WHITE BLOOD CELL COUNT, UNSPECIFIED: ICD-10-CM

## 2022-06-27 LAB
ALBUMIN SERPL ELPH-MCNC: 3.9 G/DL — SIGNIFICANT CHANGE UP (ref 3.3–5)
ALP SERPL-CCNC: 49 U/L — SIGNIFICANT CHANGE UP (ref 40–120)
ALT FLD-CCNC: 27 U/L — SIGNIFICANT CHANGE UP (ref 10–45)
ANION GAP SERPL CALC-SCNC: 12 MMOL/L — SIGNIFICANT CHANGE UP (ref 5–17)
AST SERPL-CCNC: 16 U/L — SIGNIFICANT CHANGE UP (ref 10–40)
BASOPHILS # BLD AUTO: 0.01 K/UL — SIGNIFICANT CHANGE UP (ref 0–0.2)
BASOPHILS NFR BLD AUTO: 0.1 % — SIGNIFICANT CHANGE UP (ref 0–2)
BILIRUB SERPL-MCNC: 0.7 MG/DL — SIGNIFICANT CHANGE UP (ref 0.2–1.2)
BUN SERPL-MCNC: 58 MG/DL — HIGH (ref 7–23)
CALCIUM SERPL-MCNC: 9.4 MG/DL — SIGNIFICANT CHANGE UP (ref 8.4–10.5)
CHLORIDE SERPL-SCNC: 100 MMOL/L — SIGNIFICANT CHANGE UP (ref 96–108)
CMV DNA CSF QL NAA+PROBE: SIGNIFICANT CHANGE UP
CMV DNA SPEC NAA+PROBE-LOG#: SIGNIFICANT CHANGE UP LOG10IU/ML
CO2 SERPL-SCNC: 24 MMOL/L — SIGNIFICANT CHANGE UP (ref 22–31)
CREAT SERPL-MCNC: 2.32 MG/DL — HIGH (ref 0.5–1.3)
EGFR: 31 ML/MIN/1.73M2 — LOW
EOSINOPHIL # BLD AUTO: 0 K/UL — SIGNIFICANT CHANGE UP (ref 0–0.5)
EOSINOPHIL NFR BLD AUTO: 0 % — SIGNIFICANT CHANGE UP (ref 0–6)
GLUCOSE BLDC GLUCOMTR-MCNC: 102 MG/DL — HIGH (ref 70–99)
GLUCOSE BLDC GLUCOMTR-MCNC: 102 MG/DL — HIGH (ref 70–99)
GLUCOSE BLDC GLUCOMTR-MCNC: 119 MG/DL — HIGH (ref 70–99)
GLUCOSE BLDC GLUCOMTR-MCNC: 134 MG/DL — HIGH (ref 70–99)
GLUCOSE BLDC GLUCOMTR-MCNC: 136 MG/DL — HIGH (ref 70–99)
GLUCOSE BLDC GLUCOMTR-MCNC: 143 MG/DL — HIGH (ref 70–99)
GLUCOSE BLDC GLUCOMTR-MCNC: 146 MG/DL — HIGH (ref 70–99)
GLUCOSE BLDC GLUCOMTR-MCNC: 149 MG/DL — HIGH (ref 70–99)
GLUCOSE BLDC GLUCOMTR-MCNC: 150 MG/DL — HIGH (ref 70–99)
GLUCOSE BLDC GLUCOMTR-MCNC: 152 MG/DL — HIGH (ref 70–99)
GLUCOSE BLDC GLUCOMTR-MCNC: 159 MG/DL — HIGH (ref 70–99)
GLUCOSE BLDC GLUCOMTR-MCNC: 161 MG/DL — HIGH (ref 70–99)
GLUCOSE BLDC GLUCOMTR-MCNC: 170 MG/DL — HIGH (ref 70–99)
GLUCOSE BLDC GLUCOMTR-MCNC: 183 MG/DL — HIGH (ref 70–99)
GLUCOSE BLDC GLUCOMTR-MCNC: 183 MG/DL — HIGH (ref 70–99)
GLUCOSE BLDC GLUCOMTR-MCNC: 185 MG/DL — HIGH (ref 70–99)
GLUCOSE BLDC GLUCOMTR-MCNC: 189 MG/DL — HIGH (ref 70–99)
GLUCOSE BLDC GLUCOMTR-MCNC: 195 MG/DL — HIGH (ref 70–99)
GLUCOSE BLDC GLUCOMTR-MCNC: 205 MG/DL — HIGH (ref 70–99)
GLUCOSE BLDC GLUCOMTR-MCNC: 212 MG/DL — HIGH (ref 70–99)
GLUCOSE BLDC GLUCOMTR-MCNC: 95 MG/DL — SIGNIFICANT CHANGE UP (ref 70–99)
GLUCOSE BLDC GLUCOMTR-MCNC: 99 MG/DL — SIGNIFICANT CHANGE UP (ref 70–99)
GLUCOSE SERPL-MCNC: 117 MG/DL — HIGH (ref 70–99)
HCT VFR BLD CALC: 30.8 % — LOW (ref 39–50)
HGB BLD-MCNC: 10.5 G/DL — LOW (ref 13–17)
IMM GRANULOCYTES NFR BLD AUTO: 1.7 % — HIGH (ref 0–1.5)
LYMPHOCYTES # BLD AUTO: 1.44 K/UL — SIGNIFICANT CHANGE UP (ref 1–3.3)
LYMPHOCYTES # BLD AUTO: 10 % — LOW (ref 13–44)
MAGNESIUM SERPL-MCNC: 2 MG/DL — SIGNIFICANT CHANGE UP (ref 1.6–2.6)
MCHC RBC-ENTMCNC: 29.7 PG — SIGNIFICANT CHANGE UP (ref 27–34)
MCHC RBC-ENTMCNC: 34.1 GM/DL — SIGNIFICANT CHANGE UP (ref 32–36)
MCV RBC AUTO: 87.3 FL — SIGNIFICANT CHANGE UP (ref 80–100)
MONOCYTES # BLD AUTO: 1.12 K/UL — HIGH (ref 0–0.9)
MONOCYTES NFR BLD AUTO: 7.8 % — SIGNIFICANT CHANGE UP (ref 2–14)
NEUTROPHILS # BLD AUTO: 11.61 K/UL — HIGH (ref 1.8–7.4)
NEUTROPHILS NFR BLD AUTO: 80.4 % — HIGH (ref 43–77)
NRBC # BLD: 0 /100 WBCS — SIGNIFICANT CHANGE UP (ref 0–0)
PHOSPHATE SERPL-MCNC: 2.6 MG/DL — SIGNIFICANT CHANGE UP (ref 2.5–4.5)
PLATELET # BLD AUTO: 235 K/UL — SIGNIFICANT CHANGE UP (ref 150–400)
POTASSIUM SERPL-MCNC: 4.5 MMOL/L — SIGNIFICANT CHANGE UP (ref 3.5–5.3)
POTASSIUM SERPL-SCNC: 4.5 MMOL/L — SIGNIFICANT CHANGE UP (ref 3.5–5.3)
PROT SERPL-MCNC: 6.1 G/DL — SIGNIFICANT CHANGE UP (ref 6–8.3)
RBC # BLD: 3.53 M/UL — LOW (ref 4.2–5.8)
RBC # FLD: 17 % — HIGH (ref 10.3–14.5)
SARS-COV-2 RNA SPEC QL NAA+PROBE: SIGNIFICANT CHANGE UP
SODIUM SERPL-SCNC: 136 MMOL/L — SIGNIFICANT CHANGE UP (ref 135–145)
TACROLIMUS SERPL-MCNC: 6 NG/ML — SIGNIFICANT CHANGE UP
VANCOMYCIN TROUGH SERPL-MCNC: 20 UG/ML — SIGNIFICANT CHANGE UP (ref 10–20)
WBC # BLD: 14.42 K/UL — HIGH (ref 3.8–10.5)
WBC # FLD AUTO: 14.42 K/UL — HIGH (ref 3.8–10.5)

## 2022-06-27 PROCEDURE — 99233 SBSQ HOSP IP/OBS HIGH 50: CPT | Mod: GC

## 2022-06-27 PROCEDURE — 99232 SBSQ HOSP IP/OBS MODERATE 35: CPT

## 2022-06-27 PROCEDURE — 71045 X-RAY EXAM CHEST 1 VIEW: CPT | Mod: 26

## 2022-06-27 PROCEDURE — 99291 CRITICAL CARE FIRST HOUR: CPT

## 2022-06-27 RX ORDER — TRAMADOL HYDROCHLORIDE 50 MG/1
25 TABLET ORAL EVERY 8 HOURS
Refills: 0 | Status: DISCONTINUED | OUTPATIENT
Start: 2022-06-27 | End: 2022-07-03

## 2022-06-27 RX ORDER — HYDRALAZINE HCL 50 MG
5 TABLET ORAL ONCE
Refills: 0 | Status: COMPLETED | OUTPATIENT
Start: 2022-06-27 | End: 2022-06-27

## 2022-06-27 RX ORDER — VANCOMYCIN HCL 1 G
500 VIAL (EA) INTRAVENOUS
Refills: 0 | Status: DISCONTINUED | OUTPATIENT
Start: 2022-06-28 | End: 2022-06-30

## 2022-06-27 RX ORDER — NYSTATIN 500MM UNIT
500000 POWDER (EA) MISCELLANEOUS
Refills: 0 | Status: DISCONTINUED | OUTPATIENT
Start: 2022-06-27 | End: 2022-07-08

## 2022-06-27 RX ORDER — FUROSEMIDE 40 MG
40 TABLET ORAL DAILY
Refills: 0 | Status: DISCONTINUED | OUTPATIENT
Start: 2022-06-28 | End: 2022-06-29

## 2022-06-27 RX ADMIN — Medication 650 MILLIGRAM(S): at 17:20

## 2022-06-27 RX ADMIN — Medication 650 MILLIGRAM(S): at 06:28

## 2022-06-27 RX ADMIN — MYCOPHENOLATE MOFETIL 1000 MILLIGRAM(S): 250 CAPSULE ORAL at 20:17

## 2022-06-27 RX ADMIN — Medication 500000 UNIT(S): at 20:17

## 2022-06-27 RX ADMIN — CEFEPIME 100 MILLIGRAM(S): 1 INJECTION, POWDER, FOR SOLUTION INTRAMUSCULAR; INTRAVENOUS at 13:29

## 2022-06-27 RX ADMIN — CEFEPIME 100 MILLIGRAM(S): 1 INJECTION, POWDER, FOR SOLUTION INTRAMUSCULAR; INTRAVENOUS at 21:17

## 2022-06-27 RX ADMIN — Medication 650 MILLIGRAM(S): at 13:00

## 2022-06-27 RX ADMIN — HEPARIN SODIUM 5000 UNIT(S): 5000 INJECTION INTRAVENOUS; SUBCUTANEOUS at 06:28

## 2022-06-27 RX ADMIN — TACROLIMUS 4 MILLIGRAM(S): 5 CAPSULE ORAL at 07:50

## 2022-06-27 RX ADMIN — Medication 125 MILLIGRAM(S): at 20:18

## 2022-06-27 RX ADMIN — Medication 500000 UNIT(S): at 12:00

## 2022-06-27 RX ADMIN — TACROLIMUS 4 MILLIGRAM(S): 5 CAPSULE ORAL at 20:18

## 2022-06-27 RX ADMIN — Medication 10 MILLIGRAM(S): at 13:30

## 2022-06-27 RX ADMIN — CHLORHEXIDINE GLUCONATE 1 APPLICATION(S): 213 SOLUTION TOPICAL at 07:11

## 2022-06-27 RX ADMIN — Medication 650 MILLIGRAM(S): at 07:11

## 2022-06-27 RX ADMIN — TRAMADOL HYDROCHLORIDE 25 MILLIGRAM(S): 50 TABLET ORAL at 06:27

## 2022-06-27 RX ADMIN — Medication 500000 UNIT(S): at 16:29

## 2022-06-27 RX ADMIN — CEFEPIME 100 MILLIGRAM(S): 1 INJECTION, POWDER, FOR SOLUTION INTRAMUSCULAR; INTRAVENOUS at 06:27

## 2022-06-27 RX ADMIN — Medication 5 MILLIGRAM(S): at 23:00

## 2022-06-27 RX ADMIN — PANTOPRAZOLE SODIUM 40 MILLIGRAM(S): 20 TABLET, DELAYED RELEASE ORAL at 06:30

## 2022-06-27 RX ADMIN — GABAPENTIN 100 MILLIGRAM(S): 400 CAPSULE ORAL at 17:20

## 2022-06-27 RX ADMIN — Medication 32 MILLIGRAM(S): at 20:17

## 2022-06-27 RX ADMIN — TRAMADOL HYDROCHLORIDE 25 MILLIGRAM(S): 50 TABLET ORAL at 07:11

## 2022-06-27 RX ADMIN — Medication 32 MILLIGRAM(S): at 07:50

## 2022-06-27 RX ADMIN — HEPARIN SODIUM 5000 UNIT(S): 5000 INJECTION INTRAVENOUS; SUBCUTANEOUS at 13:29

## 2022-06-27 RX ADMIN — HEPARIN SODIUM 5000 UNIT(S): 5000 INJECTION INTRAVENOUS; SUBCUTANEOUS at 21:17

## 2022-06-27 RX ADMIN — Medication 1 TABLET(S): at 16:29

## 2022-06-27 RX ADMIN — Medication 125 MILLIGRAM(S): at 07:49

## 2022-06-27 RX ADMIN — Medication 40 MILLIGRAM(S): at 06:28

## 2022-06-27 RX ADMIN — Medication 650 MILLIGRAM(S): at 17:40

## 2022-06-27 RX ADMIN — Medication 10 MILLIGRAM(S): at 21:17

## 2022-06-27 RX ADMIN — GABAPENTIN 100 MILLIGRAM(S): 400 CAPSULE ORAL at 06:28

## 2022-06-27 RX ADMIN — Medication 650 MILLIGRAM(S): at 12:00

## 2022-06-27 RX ADMIN — MYCOPHENOLATE MOFETIL 1000 MILLIGRAM(S): 250 CAPSULE ORAL at 07:49

## 2022-06-27 RX ADMIN — Medication 10 MILLIGRAM(S): at 06:29

## 2022-06-27 NOTE — PROGRESS NOTE ADULT - PROBLEM SELECTOR PLAN 2
- will continue to adjust tacro as needed for goal 12-14  - continue with solumedrol taper per protocol   - remains on cellcept 1000 mg PO BID

## 2022-06-27 NOTE — PROGRESS NOTE ADULT - PROBLEM SELECTOR PLAN 4
- todays lab showed a rise in renal function   - diuresis as needed as stated above  - will slowly increase prograf to goal  - cardiorenal consulted, appreciate recommendations

## 2022-06-27 NOTE — PROGRESS NOTE ADULT - SUBJECTIVE AND OBJECTIVE BOX
Continue invasive hemodynamic monitoringL, LENNOX  [id number]  Patient is a 66y old  Male who presents with a chief complaint of LVAD/OHT eval (26 Jun 2022 17:03)    HPI:  66M Mixed Ischemic/NICM Cardiomyopathy (EF 25-30% at baseline, s/p BIV-ICD), CAD (medically managed MIs in 2008,2011, Most recent stent in April 2022 to mLAD) presented with multiple near syncope, found to have 41 episodes of VT and admitted initially to cardiac telemetry for further evaluation/management. Ischemic evaluation without new disease and VT ablation without substrate to complete ablation.   Transferred from [facility] for further management and evaluation for LVAD vs OHT.    (26 May 2022 20:31)      Surgery/Hospital course:  6/21 OHT & AICD removal   6/22 IABP dc'ed   6/25 Maricruz and swan dc'ed     REVIEW OF SYSTEMS:  Gen: No fever  EYES/ENT: No visual changes;  No vertigo or throat pain   NECK: No pain   RES:  No shortness of breath or Cough  CARD: No chest pain   GI: No abdominal pain  : No dysuria  NEURO: No weakness  SKIN: No itching, rashes      Vital Signs Last 24 Hrs  T(C): 36 (27 Jun 2022 04:00), Max: 36.2 (26 Jun 2022 12:00)  T(F): 96.8 (27 Jun 2022 04:00), Max: 97.2 (26 Jun 2022 12:00)  HR: 94 (27 Jun 2022 04:00) (93 - 104)  BP: 110/66 (27 Jun 2022 04:00) (107/67 - 129/74)  BP(mean): 83 (27 Jun 2022 04:00) (81 - 95)  RR: 17 (27 Jun 2022 04:00) (16 - 32)  SpO2: 94% (27 Jun 2022 04:00) (91% - 100%)    ============================I/O===========================   I&O's Detail    25 Jun 2022 07:01  -  26 Jun 2022 07:00  --------------------------------------------------------  IN:    Albumin 25%  -  50 mL: 200 mL    Insulin: 69.5 mL    IV PiggyBack: 600 mL    Milrinone: 29.6 mL    Milrinone: 164 mL    Oral Fluid: 120 mL    sodium chloride 0.9%: 210 mL  Total IN: 1393.1 mL    OUT:    Chest Tube (mL): 70 mL    Chest Tube (mL): 20 mL    Chest Tube (mL): 120 mL    Indwelling Catheter - Urethral (mL): 2810 mL    Vasopressin: 0 mL  Total OUT: 3020 mL    Total NET: -1626.9 mL      26 Jun 2022 07:01  -  27 Jun 2022 06:02  --------------------------------------------------------  IN:    Insulin: 76 mL    IV PiggyBack: 450 mL    Milrinone: 30.1 mL    Milrinone: 17.5 mL    Milrinone: 26.4 mL    sodium chloride 0.9%: 100 mL  Total IN: 700 mL    OUT:    Chest Tube (mL): 40 mL    Chest Tube (mL): 90 mL    Chest Tube (mL): 40 mL    Indwelling Catheter - Urethral (mL): 975 mL    Voided (mL): 1150 mL  Total OUT: 2295 mL    Total NET: -1595 mL        ============================ LABS =========================                        10.5   14.42 )-----------( 235      ( 26 Jun 2022 23:56 )             30.8     06-26    136  |  100  |  58<H>  ----------------------------<  117<H>  4.5   |  24  |  2.32<H>    Ca    9.4      26 Jun 2022 23:56  Phos  2.6     06-26  Mg     2.0     06-26    TPro  6.1  /  Alb  3.9  /  TBili  0.7  /  DBili  x   /  AST  16  /  ALT  27  /  AlkPhos  49  06-26    LIVER FUNCTIONS - ( 26 Jun 2022 23:56 )  Alb: 3.9 g/dL / Pro: 6.1 g/dL / ALK PHOS: 49 U/L / ALT: 27 U/L / AST: 16 U/L / GGT: x             ABG - ( 26 Jun 2022 23:40 )  pH, Arterial: 7.46  pH, Blood: x     /  pCO2: 35    /  pO2: 81    / HCO3: 25    / Base Excess: 1.3   /  SaO2: 97.2                ======================Microbiology/Radiology=================  Culture: Reviewed   CXR: Reviewed  ======================================================  PAST MEDICAL & SURGICAL HISTORY:  AV block      Essential hypertension      Chronic HFrEF (heart failure with reduced ejection fraction)      Chronic kidney disease, unspecified CKD stage      CAD (coronary artery disease)      HLD (hyperlipidemia)      Type 2 diabetes mellitus      Artificial cardiac pacemaker        ====================ASSESSMENT ==============  66 yr old Male H/O HTN, NICM, AICD, DM2, chronic kidney disease Stage 3  6/21 OHT and AICD removal     Hemodynamic instability  Hypovolemia  Post op respiratory insufficiency  Acute blood loss anemia  Thrombocytopenia     Plan:  ====================== NEUROLOGY=====================  Nonfocal, continue to monitor neuro status per ICU protocol.  Tylenol, Gabapentin and Tramadol for pain      ==================== RESPIRATORY======================  Receiving supplemental O2 therapy with 2L NC  Continue to monitor RR, breathing pattern, pulse ox, and ABG's.  Encourage incentive spirometry.    ====================CARDIOVASCULAR==================  6/21 OHT and AICD removal   Continue invasive hemodynamic monitoring  Inotropic support with IV Milrinone   Blood pressure management with hydralazine   Back up VVI paced at 40 via temp epicardial wires  Continue immunosuppressive therapy as per transplant tea,      ===================HEMATOLOGIC/ONC ===================  Monitor H&H/Plts   Thrombocytopenia and acute blood loss anemia,  Continue Heparin for venous thromboembolism prophylaxis.      ===================== RENAL =========================  History of CKD  Continue monitoring urine output, I&Os, Bun/Cr  Diuresis with IV Lasix       ==================== GASTROINTESTINAL===================  Regular Consistent Carb Diet   Continue Protonix for stress ulcer prophylaxis.  Bowel regimen with Miralax      =======================    ENDOCRINE  =====================  Hx of DM 2 , continue glycemic control with regular infusion       ========================INFECTIOUS DISEASE================  Afebrile, but with white blood cells elevated to 14.42   Cefepime for AICD pocket infection   Vancomycin for Emperic coverage           Patient requires continuous monitoring with bedside rhythm monitoring, pulse ox monitoring, and intermittent blood gas analysis. Care plan discussed with ICU care team. Patient remained critical and at risk for life threatening decompensation.    By signing my name below, I, Rose Arias, attest that this documentation has been prepared under the direction and in the presence of Oren Leonardo MD   Electronically signed: Mini Goddard    I, Oren Leonardo, personally performed the services described in this documentation. all medical record entries made by the scribe were at my direction and in my presence. I have reviewed the chart and agree that the record reflects my personal performance and is accurate and complete  Electronically signed: Oren Leonardo MD 06-27-22 @ 06:02       Continue invasive hemodynamic monitoringL, LENNOX  [id number]  Patient is a 66y old  Male who presents with a chief complaint of LVAD/OHT eval (26 Jun 2022 17:03)    HPI:  66M Mixed Ischemic/NICM Cardiomyopathy (EF 25-30% at baseline, s/p BIV-ICD), CAD (medically managed MIs in 2008,2011, Most recent stent in April 2022 to mLAD) presented with multiple near syncope, found to have 41 episodes of VT and admitted initially to cardiac telemetry for further evaluation/management. Ischemic evaluation without new disease and VT ablation without substrate to complete ablation.   Transferred from [facility] for further management and evaluation for LVAD vs OHT.    (26 May 2022 20:31)      Surgery/Hospital course:  6/21 OHT & AICD removal   6/22 IABP dc'ed   6/25 Maricruz and swan dc'ed     REVIEW OF SYSTEMS:  Gen: No fever  EYES/ENT: No visual changes;  No vertigo or throat pain   NECK: No pain   RES:  No shortness of breath or Cough  CARD: No chest pain   GI: No abdominal pain  : No dysuria  NEURO: No weakness  SKIN: No itching, rashes      Vital Signs Last 24 Hrs  T(C): 36 (27 Jun 2022 04:00), Max: 36.2 (26 Jun 2022 12:00)  T(F): 96.8 (27 Jun 2022 04:00), Max: 97.2 (26 Jun 2022 12:00)  HR: 94 (27 Jun 2022 04:00) (93 - 104)  BP: 110/66 (27 Jun 2022 04:00) (107/67 - 129/74)  BP(mean): 83 (27 Jun 2022 04:00) (81 - 95)  RR: 17 (27 Jun 2022 04:00) (16 - 32)  SpO2: 94% (27 Jun 2022 04:00) (91% - 100%)    ============================I/O===========================   I&O's Detail    25 Jun 2022 07:01  -  26 Jun 2022 07:00  --------------------------------------------------------  IN:    Albumin 25%  -  50 mL: 200 mL    Insulin: 69.5 mL    IV PiggyBack: 600 mL    Milrinone: 29.6 mL    Milrinone: 164 mL    Oral Fluid: 120 mL    sodium chloride 0.9%: 210 mL  Total IN: 1393.1 mL    OUT:    Chest Tube (mL): 70 mL    Chest Tube (mL): 20 mL    Chest Tube (mL): 120 mL    Indwelling Catheter - Urethral (mL): 2810 mL    Vasopressin: 0 mL  Total OUT: 3020 mL    Total NET: -1626.9 mL      26 Jun 2022 07:01  -  27 Jun 2022 06:02  --------------------------------------------------------  IN:    Insulin: 76 mL    IV PiggyBack: 450 mL    Milrinone: 30.1 mL    Milrinone: 17.5 mL    Milrinone: 26.4 mL    sodium chloride 0.9%: 100 mL  Total IN: 700 mL    OUT:    Chest Tube (mL): 40 mL    Chest Tube (mL): 90 mL    Chest Tube (mL): 40 mL    Indwelling Catheter - Urethral (mL): 975 mL    Voided (mL): 1150 mL  Total OUT: 2295 mL    Total NET: -1595 mL        ============================ LABS =========================                        10.5   14.42 )-----------( 235      ( 26 Jun 2022 23:56 )             30.8     06-26    136  |  100  |  58<H>  ----------------------------<  117<H>  4.5   |  24  |  2.32<H>    Ca    9.4      26 Jun 2022 23:56  Phos  2.6     06-26  Mg     2.0     06-26    TPro  6.1  /  Alb  3.9  /  TBili  0.7  /  DBili  x   /  AST  16  /  ALT  27  /  AlkPhos  49  06-26    LIVER FUNCTIONS - ( 26 Jun 2022 23:56 )  Alb: 3.9 g/dL / Pro: 6.1 g/dL / ALK PHOS: 49 U/L / ALT: 27 U/L / AST: 16 U/L / GGT: x             ABG - ( 26 Jun 2022 23:40 )  pH, Arterial: 7.46  pH, Blood: x     /  pCO2: 35    /  pO2: 81    / HCO3: 25    / Base Excess: 1.3   /  SaO2: 97.2                ======================Microbiology/Radiology=================  Culture: Reviewed   CXR: Reviewed  ======================================================  PAST MEDICAL & SURGICAL HISTORY:  AV block      Essential hypertension      Chronic HFrEF (heart failure with reduced ejection fraction)      Chronic kidney disease, unspecified CKD stage      CAD (coronary artery disease)      HLD (hyperlipidemia)      Type 2 diabetes mellitus      Artificial cardiac pacemaker        ====================ASSESSMENT ==============  66 yr old Male H/O HTN, NICM, AICD, DM2, chronic kidney disease Stage 3  6/21 OHT and AICD removal     Hemodynamic instability  Hypovolemia  Post op respiratory insufficiency  Acute blood loss anemia  Thrombocytopenia     Plan:  ====================== NEUROLOGY=====================  Nonfocal, continue to monitor neuro status per ICU protocol.  Tylenol, Gabapentin and Tramadol for pain  Ambulated     ==================== RESPIRATORY======================  Receiving supplemental O2 therapy with 2L NC  Continue to monitor RR, breathing pattern, pulse ox, and ABG's.  Encourage incentive spirometry.    ====================CARDIOVASCULAR==================  6/21 OHT and AICD removal   Continue invasive hemodynamic monitoring  Inotropic support with IV Milrinone   Blood pressure management with hydralazine   Back up VVI paced at 40 via temp epicardial wires  Continue immunosuppressive therapy as per transplant tea,      ===================HEMATOLOGIC/ONC ===================  Monitor H&H/Plts   Thrombocytopenia and acute blood loss anemia,  Continue Heparin for venous thromboembolism prophylaxis.      ===================== RENAL =========================  History of CKD  Continue monitoring urine output, I&Os, Bun/Cr  Diuresis with IV Lasix       ==================== GASTROINTESTINAL===================  Regular Consistent Carb Diet   Continue Protonix for stress ulcer prophylaxis.  Bowel regimen with Miralax      =======================    ENDOCRINE  =====================  Hx of DM 2 , continue glycemic control with regular infusion       ========================INFECTIOUS DISEASE================  Afebrile, but with white blood cells elevated to 14.42   Cefepime for AICD pocket infection   Vancomycin for Emperic coverage           Patient requires continuous monitoring with bedside rhythm monitoring, pulse ox monitoring, and intermittent blood gas analysis. Care plan discussed with ICU care team. Patient remained critical and at risk for life threatening decompensation.    By signing my name below, I, Rose Arias, attest that this documentation has been prepared under the direction and in the presence of Oren Leonardo MD   Electronically signed: Mini Goddard    I, Oren Leonardo, personally performed the services described in this documentation. all medical record entries made by the scribe were at my direction and in my presence. I have reviewed the chart and agree that the record reflects my personal performance and is accurate and complete  Electronically signed: Oren Leonardo MD 06-27-22 @ 06:02       Continue invasive hemodynamic monitoringL, LENNOX  [id number]  Patient is a 66y old  Male who presents with a chief complaint of LVAD/OHT eval (26 Jun 2022 17:03)    HPI:  66M Mixed Ischemic/NICM Cardiomyopathy (EF 25-30% at baseline, s/p BIV-ICD), CAD (medically managed MIs in 2008,2011, Most recent stent in April 2022 to mLAD) presented with multiple near syncope, found to have 41 episodes of VT and admitted initially to cardiac telemetry for further evaluation/management. Ischemic evaluation without new disease and VT ablation without substrate to complete ablation.   Transferred from [facility] for further management and evaluation for LVAD vs OHT.    (26 May 2022 20:31)      Surgery/Hospital course:  6/21 OHT & AICD removal   6/22 IABP dc'ed   6/25 Maricruz and swan dc'ed     REVIEW OF SYSTEMS:  Gen: No fever  EYES/ENT: No visual changes;  No vertigo or throat pain   NECK: No pain   RES:  No shortness of breath or Cough  CARD: No chest pain   GI: No abdominal pain  : No dysuria  NEURO: No weakness  SKIN: No itching, rashes      Vital Signs Last 24 Hrs  T(C): 36 (27 Jun 2022 04:00), Max: 36.2 (26 Jun 2022 12:00)  T(F): 96.8 (27 Jun 2022 04:00), Max: 97.2 (26 Jun 2022 12:00)  HR: 94 (27 Jun 2022 04:00) (93 - 104)  BP: 110/66 (27 Jun 2022 04:00) (107/67 - 129/74)  BP(mean): 83 (27 Jun 2022 04:00) (81 - 95)  RR: 17 (27 Jun 2022 04:00) (16 - 32)  SpO2: 94% (27 Jun 2022 04:00) (91% - 100%)    ============================I/O===========================   I&O's Detail    25 Jun 2022 07:01  -  26 Jun 2022 07:00  --------------------------------------------------------  IN:    Albumin 25%  -  50 mL: 200 mL    Insulin: 69.5 mL    IV PiggyBack: 600 mL    Milrinone: 29.6 mL    Milrinone: 164 mL    Oral Fluid: 120 mL    sodium chloride 0.9%: 210 mL  Total IN: 1393.1 mL    OUT:    Chest Tube (mL): 70 mL    Chest Tube (mL): 20 mL    Chest Tube (mL): 120 mL    Indwelling Catheter - Urethral (mL): 2810 mL    Vasopressin: 0 mL  Total OUT: 3020 mL    Total NET: -1626.9 mL      26 Jun 2022 07:01  -  27 Jun 2022 06:02  --------------------------------------------------------  IN:    Insulin: 76 mL    IV PiggyBack: 450 mL    Milrinone: 30.1 mL    Milrinone: 17.5 mL    Milrinone: 26.4 mL    sodium chloride 0.9%: 100 mL  Total IN: 700 mL    OUT:    Chest Tube (mL): 40 mL    Chest Tube (mL): 90 mL    Chest Tube (mL): 40 mL    Indwelling Catheter - Urethral (mL): 975 mL    Voided (mL): 1150 mL  Total OUT: 2295 mL    Total NET: -1595 mL        ============================ LABS =========================                        10.5   14.42 )-----------( 235      ( 26 Jun 2022 23:56 )             30.8     06-26    136  |  100  |  58<H>  ----------------------------<  117<H>  4.5   |  24  |  2.32<H>    Ca    9.4      26 Jun 2022 23:56  Phos  2.6     06-26  Mg     2.0     06-26    TPro  6.1  /  Alb  3.9  /  TBili  0.7  /  DBili  x   /  AST  16  /  ALT  27  /  AlkPhos  49  06-26    LIVER FUNCTIONS - ( 26 Jun 2022 23:56 )  Alb: 3.9 g/dL / Pro: 6.1 g/dL / ALK PHOS: 49 U/L / ALT: 27 U/L / AST: 16 U/L / GGT: x             ABG - ( 26 Jun 2022 23:40 )  pH, Arterial: 7.46  pH, Blood: x     /  pCO2: 35    /  pO2: 81    / HCO3: 25    / Base Excess: 1.3   /  SaO2: 97.2                ======================Microbiology/Radiology=================  Culture: Reviewed   CXR: Reviewed  ======================================================  PAST MEDICAL & SURGICAL HISTORY:  AV block      Essential hypertension      Chronic HFrEF (heart failure with reduced ejection fraction)      Chronic kidney disease, unspecified CKD stage      CAD (coronary artery disease)      HLD (hyperlipidemia)      Type 2 diabetes mellitus      Artificial cardiac pacemaker        ====================ASSESSMENT ==============  66 yr old Male H/O HTN, NICM, AICD, DM2, chronic kidney disease Stage 3  6/21 OHT and AICD removal     Hemodynamic instability  Hypovolemia  Post op respiratory insufficiency  Acute blood loss anemia  Thrombocytopenia     Plan:  ====================== NEUROLOGY=====================  Nonfocal, continue to monitor neuro status per ICU protocol.  Tylenol, Gabapentin and Tramadol for pain  Ambulated - plan for Chest PT     ==================== RESPIRATORY======================  Receiving supplemental O2 therapy with 2L NC  Continue to monitor RR, breathing pattern, pulse ox, and ABG's.  Encourage incentive spirometry.  Plan for CT chest   ====================CARDIOVASCULAR==================  6/21 OHT and AICD removal   Continue invasive hemodynamic monitoring  Inotropic support with IV Milrinone - 2.2 mcg  Amiodarone was dc'ed last night  Blood pressure management with hydralazine   Back up VVI paced at 40 via temp epicardial wires  Continue immunosuppressive therapy as per transplant team  Plan for biopsy on 6/28       ===================HEMATOLOGIC/ONC ===================  Monitor H&H/Plts   Thrombocytopenia and acute blood loss anemia,  Continue Heparin for venous thromboembolism prophylaxis.      ===================== RENAL =========================  History of CKD  Continue monitoring urine output, I&Os, Bun/Cr  Diuresis with IV Lasix       ==================== GASTROINTESTINAL===================  Regular Consistent Carb Diet   Continue Protonix for stress ulcer prophylaxis.  Bowel regimen with Miralax      =======================    ENDOCRINE  =====================  Hx of DM 2 , continue glycemic control with regular infusion       ========================INFECTIOUS DISEASE================  Afebrile, but with white blood cells elevated to 14.42   Cefepime for AICD pocket infection   Vancomycin for Emperic coverage           Patient requires continuous monitoring with bedside rhythm monitoring, pulse ox monitoring, and intermittent blood gas analysis. Care plan discussed with ICU care team. Patient remained critical and at risk for life threatening decompensation.    By signing my name below, I, Rose Arias, attest that this documentation has been prepared under the direction and in the presence of Oren Leonardo MD   Electronically signed: Mini Goddard    I, Oren Leonardo, personally performed the services described in this documentation. all medical record entries made by the scribe were at my direction and in my presence. I have reviewed the chart and agree that the record reflects my personal performance and is accurate and complete  Electronically signed: Oren Leonardo MD 06-27-22 @ 06:02       Continue invasive hemodynamic monitoringL, LENNOX  [id number]  Patient is a 66y old  Male who presents with a chief complaint of LVAD/OHT eval (26 Jun 2022 17:03)    HPI:  66M Mixed Ischemic/NICM Cardiomyopathy (EF 25-30% at baseline, s/p BIV-ICD), CAD (medically managed MIs in 2008,2011, Most recent stent in April 2022 to mLAD) presented with multiple near syncope, found to have 41 episodes of VT and admitted initially to cardiac telemetry for further evaluation/management. Ischemic evaluation without new disease and VT ablation without substrate to complete ablation.   Transferred from [facility] for further management and evaluation for LVAD vs OHT.    (26 May 2022 20:31)      Surgery/Hospital course:  6/21 OHT & AICD removal   6/22 IABP dc'ed   6/25 Maricruz and swan dc'ed     REVIEW OF SYSTEMS:  Gen: No fever  EYES/ENT: No visual changes;  No vertigo or throat pain   NECK: No pain   RES:  No shortness of breath or Cough  CARD: No chest pain   GI: No abdominal pain  : No dysuria  NEURO: No weakness  SKIN: No itching, rashes      Vital Signs Last 24 Hrs  T(C): 36 (27 Jun 2022 04:00), Max: 36.2 (26 Jun 2022 12:00)  T(F): 96.8 (27 Jun 2022 04:00), Max: 97.2 (26 Jun 2022 12:00)  HR: 94 (27 Jun 2022 04:00) (93 - 104)  BP: 110/66 (27 Jun 2022 04:00) (107/67 - 129/74)  BP(mean): 83 (27 Jun 2022 04:00) (81 - 95)  RR: 17 (27 Jun 2022 04:00) (16 - 32)  SpO2: 94% (27 Jun 2022 04:00) (91% - 100%)    ============================I/O===========================   I&O's Detail    25 Jun 2022 07:01  -  26 Jun 2022 07:00  --------------------------------------------------------  IN:    Albumin 25%  -  50 mL: 200 mL    Insulin: 69.5 mL    IV PiggyBack: 600 mL    Milrinone: 29.6 mL    Milrinone: 164 mL    Oral Fluid: 120 mL    sodium chloride 0.9%: 210 mL  Total IN: 1393.1 mL    OUT:    Chest Tube (mL): 70 mL    Chest Tube (mL): 20 mL    Chest Tube (mL): 120 mL    Indwelling Catheter - Urethral (mL): 2810 mL    Vasopressin: 0 mL  Total OUT: 3020 mL    Total NET: -1626.9 mL      26 Jun 2022 07:01  -  27 Jun 2022 06:02  --------------------------------------------------------  IN:    Insulin: 76 mL    IV PiggyBack: 450 mL    Milrinone: 30.1 mL    Milrinone: 17.5 mL    Milrinone: 26.4 mL    sodium chloride 0.9%: 100 mL  Total IN: 700 mL    OUT:    Chest Tube (mL): 40 mL    Chest Tube (mL): 90 mL    Chest Tube (mL): 40 mL    Indwelling Catheter - Urethral (mL): 975 mL    Voided (mL): 1150 mL  Total OUT: 2295 mL    Total NET: -1595 mL        ============================ LABS =========================                        10.5   14.42 )-----------( 235      ( 26 Jun 2022 23:56 )             30.8     06-26    136  |  100  |  58<H>  ----------------------------<  117<H>  4.5   |  24  |  2.32<H>    Ca    9.4      26 Jun 2022 23:56  Phos  2.6     06-26  Mg     2.0     06-26    TPro  6.1  /  Alb  3.9  /  TBili  0.7  /  DBili  x   /  AST  16  /  ALT  27  /  AlkPhos  49  06-26    LIVER FUNCTIONS - ( 26 Jun 2022 23:56 )  Alb: 3.9 g/dL / Pro: 6.1 g/dL / ALK PHOS: 49 U/L / ALT: 27 U/L / AST: 16 U/L / GGT: x             ABG - ( 26 Jun 2022 23:40 )  pH, Arterial: 7.46  pH, Blood: x     /  pCO2: 35    /  pO2: 81    / HCO3: 25    / Base Excess: 1.3   /  SaO2: 97.2                ======================Microbiology/Radiology=================  Culture: Reviewed   CXR: Reviewed  ======================================================  PAST MEDICAL & SURGICAL HISTORY:  AV block      Essential hypertension      Chronic HFrEF (heart failure with reduced ejection fraction)      Chronic kidney disease, unspecified CKD stage      CAD (coronary artery disease)      HLD (hyperlipidemia)      Type 2 diabetes mellitus      Artificial cardiac pacemaker        ====================ASSESSMENT ==============  66 yr old Male H/O HTN, NICM, AICD, DM2, chronic kidney disease Stage 3  6/21 OHT and AICD removal     Hemodynamic instability  Hypovolemia  Post op respiratory insufficiency  Acute blood loss anemia  Thrombocytopenia     Plan:  ====================== NEUROLOGY=====================  Nonfocal, continue to monitor neuro status per ICU protocol.  Tylenol, Gabapentin and Tramadol for pain  Ambulated - plan for Chest PT     ==================== RESPIRATORY======================  Receiving supplemental O2 therapy with 2L NC  Continue to monitor RR, breathing pattern, pulse ox, and ABG's.  Encourage incentive spirometry.  Plan for CT chest   ====================CARDIOVASCULAR==================  6/21 OHT and AICD removal   Continue invasive hemodynamic monitoring  Inotropic support with IV Milrinone - 2.2 mcg  Amiodarone was dc'ed last night  Blood pressure management with hydralazine   Back up VVI paced at 40 via temp epicardial wires  Continue immunosuppressive therapy as per transplant team  Plan for biopsy on 6/28       ===================HEMATOLOGIC/ONC ===================  Monitor H&H/Plts   Thrombocytopenia and acute blood loss anemia,  Continue Heparin for venous thromboembolism prophylaxis - hold for now       ===================== RENAL =========================  History of CKD  Continue monitoring urine output, I&Os, Bun/Cr  Diuresis with IV Lasix       ==================== GASTROINTESTINAL===================  Regular Consistent Carb Diet   Continue Protonix for stress ulcer prophylaxis.  Bowel regimen with Miralax      =======================    ENDOCRINE  =====================  Hx of DM 2 , continue glycemic control with regular infusion       ========================INFECTIOUS DISEASE================  Afebrile, but with white blood cells elevated to 14.42   Cefepime for AICD pocket infection   Vancomycin for Emperic coverage           Patient requires continuous monitoring with bedside rhythm monitoring, pulse ox monitoring, and intermittent blood gas analysis. Care plan discussed with ICU care team. Patient remained critical and at risk for life threatening decompensation.    By signing my name below, I, Rose Arias, attest that this documentation has been prepared under the direction and in the presence of Oren Leonardo MD   Electronically signed: Mini Godadrd    I, Oren Leonardo, personally performed the services described in this documentation. all medical record entries made by the scribe were at my direction and in my presence. I have reviewed the chart and agree that the record reflects my personal performance and is accurate and complete  Electronically signed: Oren Leonardo MD 06-27-22 @ 06:02

## 2022-06-27 NOTE — PROGRESS NOTE ADULT - ASSESSMENT
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 5/20 with near syncope in setting of worsening HF symptoms and found to have 41 episodes of VT, many terminating with ATP. LHC did not reveal new obstructive CAD and he underwent EPS which did not reveal endocardial substrate amenable for ablation. RHC revealed severely depressed cardiac output and he was transferred to [facility] 5/26 for advanced therapies evaluation.    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 He improved significantly with sequential uptitration of inotropes and increased pacing rate, but on 6/6 he had worsening JAGRUTI. He is s/p RHC which revealed severely elevated filling pressures, severe cpcPH, and CI of 1.9 on milrinone 0.5. IABP was placed and his renal function/PAPs have improved. He is MCS dependent and was inadequately supported with high dose inotropes, so was listed UNOS status 2e for OHT, awaiting suitable donor. However, was made status 7 as he became febrile, last 6/7 T 38. He has been afebrile since and blood cultures from 6/7 with NGTD x 48 hours and his leukocytosis has not worsened. Discussed with transplant ID and since bld cx negative x48hrs he has been re-listed UNOS status 2e.     A suitable donor was identified and patient underwent OHT with Dr. Earl/Maria C on 6/21 (ischemic time 261 mins, CMV +/+, Toxo -/-). Patient was successfully extubated and IABP was removed on POD 1. Off Maricruz. He's overall progressing well. His volume status is overall improving though his renal function has taken a slight hit. Cultures from his ICD site in the OR are returning positive for staph epidermidis/ morganella morganii, remains on IV abx. Plan to RHC/EMBx Tuesday 6/28.     Primary cardiologist: Ari Wagner MD    Review of studies  TTE 5/21: LV 5.2 cm, LVEF 10-15%, LVOT VTI 10 cm, moderate RV dysfunction, mild AI, minimal MR  EKG: a-BiV paced  LHC 5/23: patent mLAD stent with slow flow, D1 with 40-50% stenosis, mild disease otherwise  RHC 5/26: RA 12, PA 70/40 (50), PCWP 22, Milana CO/CI 2.3/1.3, MAP 83 with SVR 2469, PVR 12 PERSAUD    Hemodynamics  5/27 (milrinone 0.25): RA 10, PA 76/35 (49), PCWP 34, PA sat 57% with Milana CO/CI 3.1/1.6, MAP 71 with SVR 1574, PVR 4.8  5/28 (on milrinone 0.375): RA 7, PA 63/31, PCWP 26, PA sat 72.8% with Milana CO/CI 4.9/2.5 (CI later dropped to 1.6 in the afternoon), MAP 70 with SVR 1039, PVR 2.9  5/29 (on milrinone 0.5): RA 8, PA 75/32 (50), PCWP 28, PA sat 74% with Milana CO/CI 5.0/2.6, MAP 77 with SVR 1200, PVR 4.4  5/29 (on milrinone 0.5 and nipride to 3 mcg/kg/min): RA 6, PA 59/31 (40), PCWP 30, PA sat 79% with Milana CO/CI 7.0/3.6, BP 97/57 (MAP 70) with , PVR 1.4  6/6 RHC (mil 0.5): RA 11 (v13), RV 75/17, PA 80/36/52, PCWP 24 (v29), PA sat 59.5%, CO/CI (F) 3.58/1.88  6/7 (mil 0.5, IABP 1:1): CVP 5, PA 66/32/45, PA sat 65.7%, CO/CI (F) 4.0/2.1, SVR 2000  6/8 (mil 0.5, IABP 1:1): CVP 1, PA 46/19/28, PA sat 72.7%, CO/CI (F) 5.6/2.9, SVR 1257  6/8 PM (mil 0.5, IABP 1:1): CVP 4, PA 68/25/38, PA sat 68%, CO/CI (f) 5/2.6, SVR 1326

## 2022-06-27 NOTE — PROGRESS NOTE ADULT - PROBLEM SELECTOR PLAN 1
Pt. with hemodynamically mediated JAGRUTI after OHT, in the setting of renal venous congestion and medication induced (tacro, Vanco) Upon review of labs, Scr was 1.2 on 4/19/22 and was elevated at 1.8 on 5/24/22. SCr downtrended to 1.01 on 6/20/22. Pt underwent OHT on 6/21/22 and required IABP post operatively.  UA done on 6/8/22 showed trace blood and proteinuria. Scr uptrended to 1.23 on 6/23/22 with elevated PA pressures. SCr increased to 2.32. Pt also noted to have increased in tacro dose and vancomycin levels were on higher side. Repeat UA and spot urine TP/Cr.     Pt is nonoliguric with 2.6L as documented. Agree with holding dose of diuretics today. Vanco dose held today. titrate to keep CVP < 10. Labs reviewed. On milrinone gtt. No urgent indication for RRT. Monitor labs and urine output. Discussed CTU/HF team.

## 2022-06-27 NOTE — PROGRESS NOTE ADULT - CRITICAL CARE SERVICES
35
40
35
70
35
35
70
60
70
70
35
75
35
75
75
99
35
75
35
45
35
45
75
30
35

## 2022-06-27 NOTE — PROGRESS NOTE ADULT - NS ATTEND AMEND GEN_ALL_CORE FT
s/p OHT on 6/21  cont milrinone 0.1mcg/kg/min  decrease lasix 40 daily  cont hydralazine    IS: tacrolimus increased to 4mg BID last evening   daily trough levels  cont cellcept   steroid osmany    plan for 1st biopsy tomorrow  get b/l UE venous doppler    OI PPX: CMV +/+, Toxo -/-  will start po valcyte and bactrim when stable  cont nystatin s/S    ICD pocket fluid cx + for morganella and staph epi  cont vanco  follow levels    JAGRUTI  creat 2.3 from 1.8  decrease diuretics    off High flow  ambulate with PT  IS  advance diet

## 2022-06-27 NOTE — PROGRESS NOTE ADULT - SUBJECTIVE AND OBJECTIVE BOX
INFECTIOUS DISEASES FOLLOW UP-- Arabella Escalante  [phone]    This is a follow up note for this  66yMale with  Cardiomyopathy        ROS:  CONSTITUTIONAL:  No fever, good appetite  CARDIOVASCULAR:  No chest pain or palpitations  RESPIRATORY:  No dyspnea  GASTROINTESTINAL:  No nausea, vomiting, diarrhea, or abdominal pain  GENITOURINARY:  No dysuria  NEUROLOGIC:  No headache,     Allergies    No Known Allergies    Intolerances        ANTIBIOTICS/RELEVANT:  antimicrobials  cefepime   IVPB 1000 milliGRAM(s) IV Intermittent every 8 hours  nystatin    Suspension 848294 Unit(s) Oral <User Schedule>  vancomycin    Solution 125 milliGRAM(s) Oral every 12 hours    immunologic:  mycophenolate mofetil 1000 milliGRAM(s) Oral every 12 hours  tacrolimus 4 milliGRAM(s) Oral <User Schedule>    OTHER:  acetaminophen     Tablet .. 650 milliGRAM(s) Oral every 6 hours  chlorhexidine 2% Cloths 1 Application(s) Topical <User Schedule>  dextrose 50% Injectable 50 milliLiter(s) IV Push every 15 minutes  gabapentin 100 milliGRAM(s) Oral two times a day  heparin   Injectable 5000 Unit(s) SubCutaneous every 8 hours  hydrALAZINE 10 milliGRAM(s) Oral every 8 hours  insulin regular Infusion 3 Unit(s)/Hr IV Continuous <Continuous>  methylPREDNISolone sodium succinate Injectable 32 milliGRAM(s) IV Push every 12 hours  milrinone Infusion 0.1 MICROgram(s)/kG/Min IV Continuous <Continuous>  multivitamin 1 Tablet(s) Oral daily  pantoprazole    Tablet 40 milliGRAM(s) Oral before breakfast  polyethylene glycol 3350 17 Gram(s) Oral daily  sodium chloride 0.9%. 1000 milliLiter(s) IV Continuous <Continuous>  traMADol 25 milliGRAM(s) Oral every 8 hours PRN      Objective:  Vital Signs Last 24 Hrs  T(C): 36.4 (27 Jun 2022 12:00), Max: 36.4 (27 Jun 2022 12:00)  T(F): 97.6 (27 Jun 2022 12:00), Max: 97.6 (27 Jun 2022 12:00)  HR: 96 (27 Jun 2022 14:00) (92 - 102)  BP: 111/70 (27 Jun 2022 06:00) (107/67 - 116/67)  BP(mean): 85 (27 Jun 2022 06:00) (81 - 86)  RR: 19 (27 Jun 2022 14:00) (10 - 25)  SpO2: 94% (27 Jun 2022 14:00) (91% - 98%)    PHYSICAL EXAM:  Constitutional:no acute distress  Eyes:JOHNY, EOMI  Ear/Nose/Throat: no oral lesions, 	  Respiratory: clear BL  Cardiovascular: S1S2  Gastrointestinal:soft, (+) BS, no tenderness  Extremities:no e/e/c  No Lymphadenopathy  IV sites not inflammed.    LABS:                        10.5   14.42 )-----------( 235      ( 26 Jun 2022 23:56 )             30.8     06-26    136  |  100  |  58<H>  ----------------------------<  117<H>  4.5   |  24  |  2.32<H>    Ca    9.4      26 Jun 2022 23:56  Phos  2.6     06-26  Mg     2.0     06-26    TPro  6.1  /  Alb  3.9  /  TBili  0.7  /  DBili  x   /  AST  16  /  ALT  27  /  AlkPhos  49  06-26          MICROBIOLOGY:            RECENT CULTURES:  06-21 @ 23:09  .Tissue Other  Staphylococcus epidermidis  Morganella morganii  Staphylococcus epidermidis  ANABELL    Culture is being performed.  --      RADIOLOGY & ADDITIONAL STUDIES: INFECTIOUS DISEASES FOLLOW UP-- Arabella Escalante  [phone]    This is a follow up note for this  66yMale with  Cardiomyopathy        ROS:  CONSTITUTIONAL:  No fever, good appetite  CARDIOVASCULAR:  No chest pain or palpitations  RESPIRATORY:  No dyspnea  GASTROINTESTINAL:  No nausea, vomiting, diarrhea, or abdominal pain  GENITOURINARY:  No dysuria  NEUROLOGIC:  No headache,     Allergies    No Known Allergies    Intolerances        ANTIBIOTICS/RELEVANT:  antimicrobials  cefepime   IVPB 1000 milliGRAM(s) IV Intermittent every 8 hours  nystatin    Suspension 309352 Unit(s) Oral <User Schedule>  vancomycin    Solution 125 milliGRAM(s) Oral every 12 hours    immunologic:  mycophenolate mofetil 1000 milliGRAM(s) Oral every 12 hours  tacrolimus 4 milliGRAM(s) Oral <User Schedule>    OTHER:  acetaminophen     Tablet .. 650 milliGRAM(s) Oral every 6 hours  chlorhexidine 2% Cloths 1 Application(s) Topical <User Schedule>  dextrose 50% Injectable 50 milliLiter(s) IV Push every 15 minutes  gabapentin 100 milliGRAM(s) Oral two times a day  heparin   Injectable 5000 Unit(s) SubCutaneous every 8 hours  hydrALAZINE 10 milliGRAM(s) Oral every 8 hours  insulin regular Infusion 3 Unit(s)/Hr IV Continuous <Continuous>  methylPREDNISolone sodium succinate Injectable 32 milliGRAM(s) IV Push every 12 hours  milrinone Infusion 0.1 MICROgram(s)/kG/Min IV Continuous <Continuous>  multivitamin 1 Tablet(s) Oral daily  pantoprazole    Tablet 40 milliGRAM(s) Oral before breakfast  polyethylene glycol 3350 17 Gram(s) Oral daily  sodium chloride 0.9%. 1000 milliLiter(s) IV Continuous <Continuous>  traMADol 25 milliGRAM(s) Oral every 8 hours PRN      Objective:  Vital Signs Last 24 Hrs  T(C): 36.4 (27 Jun 2022 12:00), Max: 36.4 (27 Jun 2022 12:00)  T(F): 97.6 (27 Jun 2022 12:00), Max: 97.6 (27 Jun 2022 12:00)  HR: 96 (27 Jun 2022 14:00) (92 - 102)  BP: 111/70 (27 Jun 2022 06:00) (107/67 - 116/67)  BP(mean): 85 (27 Jun 2022 06:00) (81 - 86)  RR: 19 (27 Jun 2022 14:00) (10 - 25)  SpO2: 94% (27 Jun 2022 14:00) (91% - 98%)    PHYSICAL EXAM:  Constitutional:no acute distress  Eyes:JOHNY, EOMI  Ear/Nose/Throat: no oral lesions, 	  Respiratory: clear BL  Cardiovascular: S1S2  Gastrointestinal:soft, (+) BS, no tenderness  Extremities:no e/e/c  No Lymphadenopathy  IV sites not inflammed.    LABS:                        10.5   14.42 )-----------( 235      ( 26 Jun 2022 23:56 )             30.8     06-26    136  |  100  |  58<H>  ----------------------------<  117<H>  4.5   |  24  |  2.32<H>    Ca    9.4      26 Jun 2022 23:56  Phos  2.6     06-26  Mg     2.0     06-26    TPro  6.1  /  Alb  3.9  /  TBili  0.7  /  DBili  x   /  AST  16  /  ALT  27  /  AlkPhos  49  06-26          MICROBIOLOGY:            RECENT CULTURES:  06-21 @ 23:09  .Tissue Other  Staphylococcus epidermidis  Morganella morganii  Staphylococcus epidermidis  ANABELL    Culture is being performed.  --      RADIOLOGY & ADDITIONAL STUDIES:  < from: Xray Chest 1 View- PORTABLE-Routine (Xray Chest 1 View- PORTABLE-Routine in AM.) (06.27.22 @ 02:42) >  IMPRESSION:  Bibasilar atelectasis. No significant interval change.    < end of copied text >

## 2022-06-27 NOTE — PROGRESS NOTE ADULT - PROBLEM SELECTOR PLAN 7
- postop course c/b leukocytosis  - ICD pocket culture positive for staph epidermidis/ morganella morganii  - remains on Cefepime and Vanco IV  - ID following  - low threshold to obtain CT C/A/P

## 2022-06-27 NOTE — PROGRESS NOTE ADULT - PROBLEM SELECTOR PLAN 1
- s/p OHT with Dr. Earl/Maria C on 6/21 (ischemic time 261 min)  - IABP removed on POD 1  - maintain milrinone to 0.1 mcg/kg/min  - reduce lasix to 40mg IV QD  - remains on hydralazine 10 mg PO TID, of note when started on procardia became hypotensive    - plan for RHC/EMBx Tuesday 6/28, covid swab pending

## 2022-06-27 NOTE — PROGRESS NOTE ADULT - CRITICAL CARE SERVICES PROVIDED
Patient is critically ill, requiring critical care services.

## 2022-06-27 NOTE — PROGRESS NOTE ADULT - ASSESSMENT
67 YO M with a history of ACC/AHA Stage D mixed NICM/ICM (likely familial with strong FH and early arrhythmia history in his 30's) with LVED 5.2 cm and LVEF 10-15% s/p PPM upgraded to CRT-D, CAD s/p PCI to mLAD 4/2022, well controlled DM2 (A1c 6.2%), and CKD III (Cr 1.4) who initially presented to [facility] 5/20 with near syncope in setting of worsening HF symptoms and found to have 41 episodes of VT, many terminating with ATP. LHC did not reveal new obstructive CAD and he underwent EPS which did not reveal endocardial substrate amenable for ablation. RHC revealed severely depressed cardiac output and he was transferred to [facility] 5/26 for advanced therapies evaluation.    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 He improved significantly with sequential uptitration of inotropes and increased pacing rate, but on 6/6 he had worsening JAGRUTI. He is s/p RHC which revealed severely elevated filling pressures, severe cpcPH, and CI of 1.9 on milrinone 0.5. IABP was placed and his renal function/PAPs have improved. He is MCS dependent and was inadequately supported with high dose inotropes, so was listed UNOS status 2e for OHT, awaiting suitable donor. However, was made status 7 as he became febrile, last 6/7 T 38. He has been afebrile since and blood cultures from 6/7 with NGTD x 48 hours and his leukocytosis has not worsened. Discussed with transplant ID and since bld cx negative x48hrs he has been re-listed UNOS status 2e.     A suitable donor was identified and patient underwent OHT with Dr. Earl/Maria C on 6/21 (ischemic time 261 mins, CMV +/+, Toxo -/-). On POD 1 IABP was removed. Extubated afternoon 6/22    #Positive Culture Finding (Staph epi and Morganella)  Only in liquid media so could be contaminant but reasonable to cover given murky fluid noted around pocket  --Continue Cefepime and Vancomycin  --Follow susceptibilities of Staph epi and Morganella  --Follow WBC trend  --Follow temperature curve    #Prophylactic Antibiotics  Vancomycin plus Cefepime completed on 6/24  On PO Vancomycin    CMV:  intermediate risk  will eventually need valcyte once electrolytes and renal function stabilizes    toxo  low risk    PJP  will eventually need Bactrim or Mepron    thrush:  greyson Zaldivar M.D.  [facility] Division of Infectious Disease  8AM-5PM Monday - Friday: Available on [employer] Teams  After Hours and Holidays (or if no response on [employer] Teams): Please contact the Infectious Diseases Office at [phone]  67 YO M with a history of ACC/AHA Stage D mixed NICM/ICM (likely familial with strong FH and early arrhythmia history in his 30's) with LVED 5.2 cm and LVEF 10-15% s/p PPM upgraded to CRT-D, CAD s/p PCI to mLAD 4/2022, well controlled DM2 (A1c 6.2%), and CKD III (Cr 1.4) who initially presented to [facility] 5/20 with near syncope in setting of worsening HF symptoms and found to have 41 episodes of VT, many terminating with ATP. LHC did not reveal new obstructive CAD and he underwent EPS which did not reveal endocardial substrate amenable for ablation. RHC revealed severely depressed cardiac output and he was transferred to [facility] 5/26 for advanced therapies evaluation.    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 He improved significantly with sequential uptitration of inotropes and increased pacing rate, but on 6/6 he had worsening JAGRUTI. He is s/p RHC which revealed severely elevated filling pressures, severe cpcPH, and CI of 1.9 on milrinone 0.5. IABP was placed and his renal function/PAPs have improved. He is MCS dependent and was inadequately supported with high dose inotropes, so was listed UNOS status 2e for OHT, awaiting suitable donor. However, was made status 7 as he became febrile, last 6/7 T 38. He has been afebrile since and blood cultures from 6/7 with NGTD x 48 hours and his leukocytosis has not worsened. Discussed with transplant ID and since bld cx negative x48hrs he has been re-listed UNOS status 2e.     A suitable donor was identified and patient underwent OHT with Dr. Earl/Maria C on 6/21 (ischemic time 261 mins, CMV +/+, Toxo -/-). On POD 1 IABP was removed. Extubated afternoon 6/22    #Positive Culture Finding (Staph epi and Morganella)  Only in liquid media so could be contaminant but reasonable to cover given murky fluid noted around pocket at time of explant  --Continue Cefepime and Vancomycin  --Follow susceptibilities of Staph epi and Morganella  --Follow WBC trend  --Follow temperature curve    #Prophylactic Antibiotics  Vancomycin plus Cefepime completed on 6/24  On PO Vancomycin    CMV:  intermediate risk  will eventually need valcyte once electrolytes and renal function stabilizes    toxo  low risk    PJP  will eventually need Bactrim or Mepron    thrush:  nystatin    Reggie Escalante MD  Can be called via Teams  After 5pm/weekends [phone]

## 2022-06-27 NOTE — PROGRESS NOTE ADULT - SUBJECTIVE AND OBJECTIVE BOX
[facility] DIVISION OF KIDNEY DISEASES AND HYPERTENSION   FOLLOW UP NOTE    --------------------------------------------------------------------------------  66M male with PMHx HTN, HLD, type 2 DM, chronic HFrEF,  (EF 25-30% by Echo) underwent OHT on 6/21/22. Nephrology consulted for JAGRUTI.     Upon review of labs, Scr was 1.2 on 4/19/22 and was elevated at 1.8 on 5/24/22. SCr downtrended to 1.01 on 6/20/22. Pt underwent OHT on 6/21/22 and required IABP post operatively. Scr uptrended to 1.23 on 6/23/22 with elevated PA pressures. SCr increased to 1.97. Pt currently on diuretics and non oliguric. UA done on 6/8/22 showed trace blood and proteinuria. Today Cr elevated to 1.77<<2.32.    Pt. seen and examined in CTU, sitting comfortably in chair, non oliguric with UOP: ~2.6 in the past 24hrs. Vitals/labs/imaging reviewed.     PAST HISTORY  --------------------------------------------------------------------------------  No significant changes to PMH, PSH, FHx, SHx, unless otherwise noted    ALLERGIES & MEDICATIONS  --------------------------------------------------------------------------------  Allergies    No Known Allergies    Intolerances      Standing Inpatient Medications  acetaminophen     Tablet .. 650 milliGRAM(s) Oral every 6 hours  cefepime   IVPB 1000 milliGRAM(s) IV Intermittent every 8 hours  chlorhexidine 2% Cloths 1 Application(s) Topical <User Schedule>  dextrose 50% Injectable 50 milliLiter(s) IV Push every 15 minutes  furosemide   Injectable 40 milliGRAM(s) IV Push two times a day  gabapentin 100 milliGRAM(s) Oral two times a day  heparin   Injectable 5000 Unit(s) SubCutaneous every 8 hours  hydrALAZINE 10 milliGRAM(s) Oral every 8 hours  insulin regular Infusion 3 Unit(s)/Hr IV Continuous <Continuous>  methylPREDNISolone sodium succinate Injectable 32 milliGRAM(s) IV Push every 12 hours  milrinone Infusion 0.1 MICROgram(s)/kG/Min IV Continuous <Continuous>  mycophenolate mofetil 1000 milliGRAM(s) Oral every 12 hours  nystatin    Suspension 648324 Unit(s) Oral <User Schedule>  pantoprazole    Tablet 40 milliGRAM(s) Oral before breakfast  polyethylene glycol 3350 17 Gram(s) Oral daily  sodium chloride 0.9%. 1000 milliLiter(s) IV Continuous <Continuous>  tacrolimus 4 milliGRAM(s) Oral <User Schedule>  vancomycin    Solution 125 milliGRAM(s) Oral every 12 hours    PRN Inpatient Medications  traMADol 25 milliGRAM(s) Oral every 8 hours PRN      REVIEW OF SYSTEMS  --------------------------------------------------------------------------------  Gen: +fatigue  No fevers/chills  Skin: No rashes  Head/Eyes/Ears: Normal hearing,   Respiratory: No dyspnea, cough  CV: No chest pain  GI: No abdominal pain, diarrhea  : No dysuria, hematuria  MSK: +shoulder pain-  +edema    All other systems were reviewed and are negative, except as noted.    VITALS/PHYSICAL EXAM  --------------------------------------------------------------------------------  T(C): 36.1 (06-27-22 @ 08:00), Max: 36.2 (06-26-22 @ 12:00)  HR: 94 (06-27-22 @ 11:00) (92 - 104)  BP: 111/70 (06-27-22 @ 06:00) (107/67 - 123/72)  RR: 20 (06-27-22 @ 11:00) (10 - 32)  SpO2: 94% (06-27-22 @ 11:00) (91% - 98%)  Wt(kg): --      06-26-22 @ 07:01  -  06-27-22 @ 07:00  --------------------------------------------------------  IN: 774.4 mL / OUT: 2615 mL / NET: -1840.6 mL    06-27-22 @ 07:01  -  06-27-22 @ 11:44  --------------------------------------------------------  IN: 304.8 mL / OUT: 885 mL / NET: -580.2 mL      Physical Exam:  	Gen: NAD  	HEENT: Anicteric  	Pulm: CTA B/L  	CV: S1S2+, midline sx scar, dressing +  	Abd: Soft, +BS    	Ext: LE edema B/L+  	Neuro: Awake              : Hernandez+ with clear urine in the bag  	Skin: Warm and dry    LABS/STUDIES  --------------------------------------------------------------------------------              10.5   14.42 >-----------<  235      [06-26-22 @ 23:56]              30.8     136  |  100  |  58  ----------------------------<  117      [06-26-22 @ 23:56]  4.5   |  24  |  2.32        Ca     9.4     [06-26-22 @ 23:56]      Mg     2.0     [06-26-22 @ 23:56]      Phos  2.6     [06-26-22 @ 23:56]    TPro  6.1  /  Alb  3.9  /  TBili  0.7  /  DBili  x   /  AST  16  /  ALT  27  /  AlkPhos  49  [06-26-22 @ 23:56]    Creatinine Trend:  SCr 2.32 [06-26 @ 23:56]  SCr 1.77 [06-26 @ 01:06]  SCr 1.84 [06-25 @ 00:39]  SCr 1.92 [06-24 @ 01:37]  SCr 1.27 [06-23 @ 00:52]    Urinalysis - [06-08-22 @ 00:26]      Color Light Yellow / Appearance Clear / SG 1.014 / pH 6.0      Gluc Negative / Ketone Negative  / Bili Negative / Urobili Negative       Blood Trace / Protein Trace / Leuk Est Negative / Nitrite Negative      RBC 1 / WBC 0 / Hyaline 0 / Gran  / Sq Epi  / Non Sq Epi 0 / Bacteria Negative      Syphilis Screen (Treponema Pallidum Ab) Negative      [06-02-22 @ 21:12]  Immunofixation Urine:   Reference Range: None Detected      [06-05-22 @ 12:15]  UPEP Interpretation: Normal Electrophoresis Pattern      [06-05-22 @ 12:15]    TacrolimusTacrolimus (), Serum: 5.9 ng/mL (06-26 @ 07:06)  Tacrolimus (), Serum: 4.5 ng/mL (06-25 @ 06:52)  Tacrolimus (), Serum: 4.6 ng/mL (06-24 @ 07:13)

## 2022-06-28 ENCOUNTER — APPOINTMENT (OUTPATIENT)
Dept: CV DIAGNOSITCS | Facility: HOSPITAL | Age: 67
End: 2022-06-28

## 2022-06-28 ENCOUNTER — RESULT REVIEW (OUTPATIENT)
Age: 67
End: 2022-06-28

## 2022-06-28 LAB
ALBUMIN SERPL ELPH-MCNC: 3.6 G/DL — SIGNIFICANT CHANGE UP (ref 3.3–5)
ALP SERPL-CCNC: 50 U/L — SIGNIFICANT CHANGE UP (ref 40–120)
ALT FLD-CCNC: 24 U/L — SIGNIFICANT CHANGE UP (ref 10–45)
ANION GAP SERPL CALC-SCNC: 11 MMOL/L — SIGNIFICANT CHANGE UP (ref 5–17)
APTT BLD: 37.1 SEC — HIGH (ref 27.5–35.5)
AST SERPL-CCNC: 16 U/L — SIGNIFICANT CHANGE UP (ref 10–40)
BASOPHILS # BLD AUTO: 0.04 K/UL — SIGNIFICANT CHANGE UP (ref 0–0.2)
BASOPHILS NFR BLD AUTO: 0.3 % — SIGNIFICANT CHANGE UP (ref 0–2)
BILIRUB SERPL-MCNC: 0.6 MG/DL — SIGNIFICANT CHANGE UP (ref 0.2–1.2)
BUN SERPL-MCNC: 49 MG/DL — HIGH (ref 7–23)
CALCIUM SERPL-MCNC: 9 MG/DL — SIGNIFICANT CHANGE UP (ref 8.4–10.5)
CHLORIDE SERPL-SCNC: 101 MMOL/L — SIGNIFICANT CHANGE UP (ref 96–108)
CO2 SERPL-SCNC: 21 MMOL/L — LOW (ref 22–31)
CREAT SERPL-MCNC: 1.65 MG/DL — HIGH (ref 0.5–1.3)
EGFR: 46 ML/MIN/1.73M2 — LOW
EOSINOPHIL # BLD AUTO: 0.01 K/UL — SIGNIFICANT CHANGE UP (ref 0–0.5)
EOSINOPHIL NFR BLD AUTO: 0.1 % — SIGNIFICANT CHANGE UP (ref 0–6)
GAS PNL BLDA: SIGNIFICANT CHANGE UP
GLUCOSE BLDC GLUCOMTR-MCNC: 107 MG/DL — HIGH (ref 70–99)
GLUCOSE BLDC GLUCOMTR-MCNC: 111 MG/DL — HIGH (ref 70–99)
GLUCOSE BLDC GLUCOMTR-MCNC: 115 MG/DL — HIGH (ref 70–99)
GLUCOSE BLDC GLUCOMTR-MCNC: 115 MG/DL — HIGH (ref 70–99)
GLUCOSE BLDC GLUCOMTR-MCNC: 120 MG/DL — HIGH (ref 70–99)
GLUCOSE BLDC GLUCOMTR-MCNC: 122 MG/DL — HIGH (ref 70–99)
GLUCOSE BLDC GLUCOMTR-MCNC: 126 MG/DL — HIGH (ref 70–99)
GLUCOSE BLDC GLUCOMTR-MCNC: 131 MG/DL — HIGH (ref 70–99)
GLUCOSE BLDC GLUCOMTR-MCNC: 135 MG/DL — HIGH (ref 70–99)
GLUCOSE BLDC GLUCOMTR-MCNC: 137 MG/DL — HIGH (ref 70–99)
GLUCOSE BLDC GLUCOMTR-MCNC: 145 MG/DL — HIGH (ref 70–99)
GLUCOSE BLDC GLUCOMTR-MCNC: 149 MG/DL — HIGH (ref 70–99)
GLUCOSE BLDC GLUCOMTR-MCNC: 164 MG/DL — HIGH (ref 70–99)
GLUCOSE BLDC GLUCOMTR-MCNC: 175 MG/DL — HIGH (ref 70–99)
GLUCOSE BLDC GLUCOMTR-MCNC: 181 MG/DL — HIGH (ref 70–99)
GLUCOSE BLDC GLUCOMTR-MCNC: 183 MG/DL — HIGH (ref 70–99)
GLUCOSE BLDC GLUCOMTR-MCNC: 191 MG/DL — HIGH (ref 70–99)
GLUCOSE BLDC GLUCOMTR-MCNC: 194 MG/DL — HIGH (ref 70–99)
GLUCOSE BLDC GLUCOMTR-MCNC: 199 MG/DL — HIGH (ref 70–99)
GLUCOSE BLDC GLUCOMTR-MCNC: 204 MG/DL — HIGH (ref 70–99)
GLUCOSE BLDC GLUCOMTR-MCNC: 86 MG/DL — SIGNIFICANT CHANGE UP (ref 70–99)
GLUCOSE SERPL-MCNC: 143 MG/DL — HIGH (ref 70–99)
HCT VFR BLD CALC: 30 % — LOW (ref 39–50)
HGB BLD-MCNC: 10 G/DL — LOW (ref 13–17)
IMM GRANULOCYTES NFR BLD AUTO: 3.7 % — HIGH (ref 0–1.5)
INR BLD: 1.1 RATIO — SIGNIFICANT CHANGE UP (ref 0.88–1.16)
LYMPHOCYTES # BLD AUTO: 1.7 K/UL — SIGNIFICANT CHANGE UP (ref 1–3.3)
LYMPHOCYTES # BLD AUTO: 13.1 % — SIGNIFICANT CHANGE UP (ref 13–44)
MAGNESIUM SERPL-MCNC: 2 MG/DL — SIGNIFICANT CHANGE UP (ref 1.6–2.6)
MCHC RBC-ENTMCNC: 29.2 PG — SIGNIFICANT CHANGE UP (ref 27–34)
MCHC RBC-ENTMCNC: 33.3 GM/DL — SIGNIFICANT CHANGE UP (ref 32–36)
MCV RBC AUTO: 87.5 FL — SIGNIFICANT CHANGE UP (ref 80–100)
MONOCYTES # BLD AUTO: 0.83 K/UL — SIGNIFICANT CHANGE UP (ref 0–0.9)
MONOCYTES NFR BLD AUTO: 6.4 % — SIGNIFICANT CHANGE UP (ref 2–14)
NEUTROPHILS # BLD AUTO: 9.88 K/UL — HIGH (ref 1.8–7.4)
NEUTROPHILS NFR BLD AUTO: 76.4 % — SIGNIFICANT CHANGE UP (ref 43–77)
NRBC # BLD: 0 /100 WBCS — SIGNIFICANT CHANGE UP (ref 0–0)
PHOSPHATE SERPL-MCNC: 2 MG/DL — LOW (ref 2.5–4.5)
PLATELET # BLD AUTO: 252 K/UL — SIGNIFICANT CHANGE UP (ref 150–400)
POTASSIUM SERPL-MCNC: 4.6 MMOL/L — SIGNIFICANT CHANGE UP (ref 3.5–5.3)
POTASSIUM SERPL-SCNC: 4.6 MMOL/L — SIGNIFICANT CHANGE UP (ref 3.5–5.3)
PROT SERPL-MCNC: 5.6 G/DL — LOW (ref 6–8.3)
PROTHROM AB SERPL-ACNC: 12.8 SEC — SIGNIFICANT CHANGE UP (ref 10.5–13.4)
RBC # BLD: 3.43 M/UL — LOW (ref 4.2–5.8)
RBC # FLD: 16.9 % — HIGH (ref 10.3–14.5)
SODIUM SERPL-SCNC: 133 MMOL/L — LOW (ref 135–145)
TACROLIMUS SERPL-MCNC: 7.6 NG/ML — SIGNIFICANT CHANGE UP
VANCOMYCIN TROUGH SERPL-MCNC: 6.7 UG/ML — LOW (ref 10–20)
WBC # BLD: 12.94 K/UL — HIGH (ref 3.8–10.5)
WBC # FLD AUTO: 12.94 K/UL — HIGH (ref 3.8–10.5)

## 2022-06-28 PROCEDURE — 99291 CRITICAL CARE FIRST HOUR: CPT

## 2022-06-28 PROCEDURE — 93321 DOPPLER ECHO F-UP/LMTD STD: CPT | Mod: 26,77

## 2022-06-28 PROCEDURE — 93308 TTE F-UP OR LMTD: CPT | Mod: 26

## 2022-06-28 PROCEDURE — 93308 TTE F-UP OR LMTD: CPT | Mod: 26,77

## 2022-06-28 PROCEDURE — 99152 MOD SED SAME PHYS/QHP 5/>YRS: CPT

## 2022-06-28 PROCEDURE — 71045 X-RAY EXAM CHEST 1 VIEW: CPT | Mod: 26

## 2022-06-28 PROCEDURE — 93970 EXTREMITY STUDY: CPT | Mod: 26

## 2022-06-28 PROCEDURE — 99233 SBSQ HOSP IP/OBS HIGH 50: CPT | Mod: GC

## 2022-06-28 PROCEDURE — 99232 SBSQ HOSP IP/OBS MODERATE 35: CPT

## 2022-06-28 PROCEDURE — 88346 IMFLUOR 1ST 1ANTB STAIN PX: CPT | Mod: 26

## 2022-06-28 PROCEDURE — 88307 TISSUE EXAM BY PATHOLOGIST: CPT | Mod: 26

## 2022-06-28 PROCEDURE — 93321 DOPPLER ECHO F-UP/LMTD STD: CPT | Mod: 26

## 2022-06-28 PROCEDURE — 93505 ENDOMYOCARDIAL BIOPSY: CPT | Mod: 26

## 2022-06-28 RX ORDER — TACROLIMUS 5 MG/1
5 CAPSULE ORAL
Refills: 0 | Status: DISCONTINUED | OUTPATIENT
Start: 2022-06-28 | End: 2022-06-29

## 2022-06-28 RX ORDER — HEPARIN SODIUM 5000 [USP'U]/ML
5000 INJECTION INTRAVENOUS; SUBCUTANEOUS EVERY 8 HOURS
Refills: 0 | Status: DISCONTINUED | OUTPATIENT
Start: 2022-06-28 | End: 2022-06-29

## 2022-06-28 RX ORDER — HYDRALAZINE HCL 50 MG
25 TABLET ORAL EVERY 8 HOURS
Refills: 0 | Status: DISCONTINUED | OUTPATIENT
Start: 2022-06-28 | End: 2022-07-01

## 2022-06-28 RX ORDER — HYDRALAZINE HCL 50 MG
10 TABLET ORAL ONCE
Refills: 0 | Status: COMPLETED | OUTPATIENT
Start: 2022-06-28 | End: 2022-06-28

## 2022-06-28 RX ORDER — ATOVAQUONE 750 MG/5ML
1500 SUSPENSION ORAL DAILY
Refills: 0 | Status: DISCONTINUED | OUTPATIENT
Start: 2022-06-28 | End: 2022-07-08

## 2022-06-28 RX ADMIN — Medication 25 MILLIGRAM(S): at 21:12

## 2022-06-28 RX ADMIN — Medication 10 MILLIGRAM(S): at 05:22

## 2022-06-28 RX ADMIN — Medication 40 MILLIGRAM(S): at 05:21

## 2022-06-28 RX ADMIN — Medication 650 MILLIGRAM(S): at 11:25

## 2022-06-28 RX ADMIN — CHLORHEXIDINE GLUCONATE 1 APPLICATION(S): 213 SOLUTION TOPICAL at 06:29

## 2022-06-28 RX ADMIN — ATOVAQUONE 1500 MILLIGRAM(S): 750 SUSPENSION ORAL at 11:37

## 2022-06-28 RX ADMIN — Medication 100 MILLIGRAM(S): at 11:38

## 2022-06-28 RX ADMIN — Medication 650 MILLIGRAM(S): at 11:51

## 2022-06-28 RX ADMIN — CEFEPIME 100 MILLIGRAM(S): 1 INJECTION, POWDER, FOR SOLUTION INTRAMUSCULAR; INTRAVENOUS at 05:20

## 2022-06-28 RX ADMIN — Medication 500000 UNIT(S): at 08:59

## 2022-06-28 RX ADMIN — Medication 650 MILLIGRAM(S): at 17:53

## 2022-06-28 RX ADMIN — Medication 650 MILLIGRAM(S): at 18:26

## 2022-06-28 RX ADMIN — MYCOPHENOLATE MOFETIL 1000 MILLIGRAM(S): 250 CAPSULE ORAL at 08:59

## 2022-06-28 RX ADMIN — Medication 125 MILLIGRAM(S): at 08:59

## 2022-06-28 RX ADMIN — Medication 650 MILLIGRAM(S): at 05:21

## 2022-06-28 RX ADMIN — Medication 500000 UNIT(S): at 11:24

## 2022-06-28 RX ADMIN — TACROLIMUS 4 MILLIGRAM(S): 5 CAPSULE ORAL at 09:01

## 2022-06-28 RX ADMIN — CEFEPIME 100 MILLIGRAM(S): 1 INJECTION, POWDER, FOR SOLUTION INTRAMUSCULAR; INTRAVENOUS at 13:10

## 2022-06-28 RX ADMIN — Medication 500000 UNIT(S): at 20:02

## 2022-06-28 RX ADMIN — Medication 25 MILLIGRAM(S): at 13:09

## 2022-06-28 RX ADMIN — Medication 63.75 MILLIMOLE(S): at 01:56

## 2022-06-28 RX ADMIN — Medication 28 MILLIGRAM(S): at 08:59

## 2022-06-28 RX ADMIN — GABAPENTIN 100 MILLIGRAM(S): 400 CAPSULE ORAL at 05:21

## 2022-06-28 RX ADMIN — Medication 125 MILLIGRAM(S): at 20:02

## 2022-06-28 RX ADMIN — CEFEPIME 100 MILLIGRAM(S): 1 INJECTION, POWDER, FOR SOLUTION INTRAMUSCULAR; INTRAVENOUS at 21:11

## 2022-06-28 RX ADMIN — Medication 500000 UNIT(S): at 15:02

## 2022-06-28 RX ADMIN — GABAPENTIN 100 MILLIGRAM(S): 400 CAPSULE ORAL at 17:53

## 2022-06-28 RX ADMIN — TACROLIMUS 5 MILLIGRAM(S): 5 CAPSULE ORAL at 20:01

## 2022-06-28 RX ADMIN — Medication 28 MILLIGRAM(S): at 20:02

## 2022-06-28 RX ADMIN — PANTOPRAZOLE SODIUM 40 MILLIGRAM(S): 20 TABLET, DELAYED RELEASE ORAL at 05:22

## 2022-06-28 RX ADMIN — Medication 10 MILLIGRAM(S): at 09:39

## 2022-06-28 RX ADMIN — MYCOPHENOLATE MOFETIL 1000 MILLIGRAM(S): 250 CAPSULE ORAL at 20:01

## 2022-06-28 RX ADMIN — Medication 1 TABLET(S): at 11:25

## 2022-06-28 NOTE — PROGRESS NOTE ADULT - PROBLEM SELECTOR PLAN 4
- renal function is overall improving  - diuresis as needed as stated above  - will slowly increase prograf to goal  - cardiorenal consulted, appreciate recommendations

## 2022-06-28 NOTE — PROGRESS NOTE ADULT - PROBLEM SELECTOR PLAN 1
- s/p OHT with Dr. Earl/Maria C on 6/21 (ischemic time 261 min)  - IABP removed on POD 1  - maintain milrinone to 0.1 mcg/kg/min  - continue lasix to 40mg IV QD  - remains on hydralazine 25 mg PO TID, if pressures remain elevated ok to start Procardia 30 mg PO QD  - next RHC/EMBx schedule for 7/5, requires COVID swab to be sent 7/4

## 2022-06-28 NOTE — PROGRESS NOTE ADULT - SUBJECTIVE AND OBJECTIVE BOX
[facility] DIVISION OF KIDNEY DISEASES AND HYPERTENSION   FOLLOW UP NOTE    --------------------------------------------------------------------------------  66M male with PMHx HTN, HLD, type 2 DM, chronic HFrEF,  (EF 25-30% by Echo) underwent OHT on 6/21/22. Nephrology consulted for JAGRUTI.     Upon review of labs, Scr was 1.2 on 4/19/22 and was elevated at 1.8 on 5/24/22. SCr downtrended to 1.01 on 6/20/22. Pt underwent OHT on 6/21/22 and required IABP post operatively. Scr uptrended to 1.23 on 6/23/22 with elevated PA pressures. SCr increased to 1.97. Pt currently on diuretics and non oliguric. UA done on 6/8/22 showed trace blood and proteinuria.  SCr elevated to 1.77<<2.32, now improved to 1.65 today.     Pt. seen and examined in CTU, sitting comfortably in chair, non oliguric with UOP: ~2.6 in the past 24hrs. Vitals/labs/imaging reviewed.      PAST HISTORY  --------------------------------------------------------------------------------  No significant changes to PMH, PSH, FHx, SHx, unless otherwise noted    ALLERGIES & MEDICATIONS  --------------------------------------------------------------------------------  Allergies    No Known Allergies    Intolerances      Standing Inpatient Medications  acetaminophen     Tablet .. 650 milliGRAM(s) Oral every 6 hours  atovaquone  Suspension 1500 milliGRAM(s) Oral daily  cefepime   IVPB 1000 milliGRAM(s) IV Intermittent every 8 hours  chlorhexidine 2% Cloths 1 Application(s) Topical <User Schedule>  dextrose 50% Injectable 50 milliLiter(s) IV Push every 15 minutes  furosemide   Injectable 40 milliGRAM(s) IV Push daily  gabapentin 100 milliGRAM(s) Oral two times a day  hydrALAZINE 25 milliGRAM(s) Oral every 8 hours  insulin regular Infusion 3 Unit(s)/Hr IV Continuous <Continuous>  methylPREDNISolone sodium succinate Injectable 28 milliGRAM(s) IV Push every 12 hours  milrinone Infusion 0.1 MICROgram(s)/kG/Min IV Continuous <Continuous>  multivitamin 1 Tablet(s) Oral daily  mycophenolate mofetil 1000 milliGRAM(s) Oral every 12 hours  nystatin    Suspension 757865 Unit(s) Oral <User Schedule>  pantoprazole    Tablet 40 milliGRAM(s) Oral before breakfast  polyethylene glycol 3350 17 Gram(s) Oral daily  sodium chloride 0.9%. 1000 milliLiter(s) IV Continuous <Continuous>  tacrolimus 4 milliGRAM(s) Oral <User Schedule>  vancomycin    Solution 125 milliGRAM(s) Oral every 12 hours  vancomycin  IVPB 500 milliGRAM(s) IV Intermittent <User Schedule>    PRN Inpatient Medications  traMADol 25 milliGRAM(s) Oral every 8 hours PRN      REVIEW OF SYSTEMS  --------------------------------------------------------------------------------  Gen: +fatigue  No fevers/chills  Skin: No rashes  Head/Eyes/Ears: Normal hearing,   Respiratory: No dyspnea, cough  CV: No chest pain  GI: No abdominal pain, diarrhea  : No dysuria, hematuria  MSK: +shoulder pain-  +edema    All other systems were reviewed and are negative, except as noted.    VITALS/PHYSICAL EXAM  --------------------------------------------------------------------------------  T(C): 36.4 (06-28-22 @ 09:00), Max: 36.4 (06-27-22 @ 16:00)  HR: 96 (06-28-22 @ 12:00) (88 - 99)  BP: 121/58 (06-28-22 @ 12:00) (121/58 - 152/90)  RR: 24 (06-28-22 @ 12:00) (14 - 32)  SpO2: 97% (06-28-22 @ 12:00) (93% - 97%)  Wt(kg): --      06-27-22 @ 07:01  -  06-28-22 @ 07:00  --------------------------------------------------------  IN: 1610.6 mL / OUT: 2540 mL / NET: -929.4 mL    06-28-22 @ 07:01  -  06-28-22 @ 13:03  --------------------------------------------------------  IN: 508 mL / OUT: 505 mL / NET: 3 mL      Physical Exam:  	Gen: NAD  	HEENT: Anicteric  	Pulm: CTA B/L  	CV: S1S2+, midline sx scar, dressing +  	Abd: Soft, +BS    	Ext: LE edema B/L+  	Neuro: Awake              : Hernandez+ with clear urine in the bag  	Skin: Warm and dry      LABS/STUDIES  --------------------------------------------------------------------------------              10.0   12.94 >-----------<  252      [06-28-22 @ 00:19]              30.0     133  |  101  |  49  ----------------------------<  143      [06-28-22 @ 00:18]  4.6   |  21  |  1.65        Ca     9.0     [06-28-22 @ 00:18]      Mg     2.0     [06-28-22 @ 00:18]      Phos  2.0     [06-28-22 @ 00:18]    Creatinine Trend:  SCr 1.65 [06-28 @ 00:18]  SCr 2.32 [06-26 @ 23:56]  SCr 1.77 [06-26 @ 01:06]  SCr 1.84 [06-25 @ 00:39]  SCr 1.92 [06-24 @ 01:37]    Urinalysis - [06-08-22 @ 00:26]      Color Light Yellow / Appearance Clear / SG 1.014 / pH 6.0      Gluc Negative / Ketone Negative  / Bili Negative / Urobili Negative       Blood Trace / Protein Trace / Leuk Est Negative / Nitrite Negative      RBC 1 / WBC 0 / Hyaline 0 / Gran  / Sq Epi  / Non Sq Epi 0 / Bacteria Negative      Syphilis Screen (Treponema Pallidum Ab) Negative      [06-02-22 @ 21:12]  Immunofixation Urine:   Reference Range: None Detected      [06-05-22 @ 12:15]  UPEP Interpretation: Normal Electrophoresis Pattern      [06-05-22 @ 12:15]    TacrolimusTacrolimus (), Serum: 7.6 ng/mL (06-28 @ 06:57)  Tacrolimus (), Serum: 6.0 ng/mL (06-27 @ 07:09)  Tacrolimus (), Serum: 5.9 ng/mL (06-26 @ 07:06)  Tacrolimus (), Serum: 4.5 ng/mL (06-25 @ 06:52)    CMV PCRCMVPCR Log: NotDetec Fiy71IX/mL (06-26 @ 23:56)

## 2022-06-28 NOTE — PROGRESS NOTE ADULT - SUBJECTIVE AND OBJECTIVE BOX
INFECTIOUS DISEASES FOLLOW UP-- Arabella Escalante  [phone]    This is a follow up note for this  66yMale with  Cardiomyopathy  s/p heart transplant        ROS:  CONSTITUTIONAL:  No fever, good appetite  CARDIOVASCULAR:  No chest pain or palpitations  RESPIRATORY:  No dyspnea  GASTROINTESTINAL:  No nausea, vomiting, diarrhea, or abdominal pain  GENITOURINARY:  No dysuria  NEUROLOGIC:  No headache,     Allergies    No Known Allergies    Intolerances        ANTIBIOTICS/RELEVANT:  antimicrobials  atovaquone  Suspension 1500 milliGRAM(s) Oral daily  cefepime   IVPB 1000 milliGRAM(s) IV Intermittent every 8 hours  nystatin    Suspension 049738 Unit(s) Oral <User Schedule>  vancomycin    Solution 125 milliGRAM(s) Oral every 12 hours  vancomycin  IVPB 500 milliGRAM(s) IV Intermittent <User Schedule>    immunologic:  mycophenolate mofetil 1000 milliGRAM(s) Oral every 12 hours  tacrolimus 5 milliGRAM(s) Oral <User Schedule>    OTHER:  acetaminophen     Tablet .. 650 milliGRAM(s) Oral every 6 hours  chlorhexidine 2% Cloths 1 Application(s) Topical <User Schedule>  dextrose 50% Injectable 50 milliLiter(s) IV Push every 15 minutes  furosemide   Injectable 40 milliGRAM(s) IV Push daily  gabapentin 100 milliGRAM(s) Oral two times a day  hydrALAZINE 25 milliGRAM(s) Oral every 8 hours  insulin regular Infusion 3 Unit(s)/Hr IV Continuous <Continuous>  methylPREDNISolone sodium succinate Injectable 28 milliGRAM(s) IV Push every 12 hours  milrinone Infusion 0.1 MICROgram(s)/kG/Min IV Continuous <Continuous>  multivitamin 1 Tablet(s) Oral daily  pantoprazole    Tablet 40 milliGRAM(s) Oral before breakfast  polyethylene glycol 3350 17 Gram(s) Oral daily  sodium chloride 0.9%. 1000 milliLiter(s) IV Continuous <Continuous>  traMADol 25 milliGRAM(s) Oral every 8 hours PRN      Objective:  Vital Signs Last 24 Hrs  T(C): 36.4 (28 Jun 2022 16:00), Max: 36.6 (28 Jun 2022 12:00)  T(F): 97.6 (28 Jun 2022 16:00), Max: 97.8 (28 Jun 2022 12:00)  HR: 97 (28 Jun 2022 17:00) (88 - 99)  BP: 123/73 (28 Jun 2022 16:00) (114/62 - 152/90)  BP(mean): 91 (28 Jun 2022 16:00) (82 - 115)  RR: 28 (28 Jun 2022 16:00) (14 - 32)  SpO2: 97% (28 Jun 2022 17:00) (94% - 98%)    PHYSICAL EXAM:  Constitutional:no acute distress  Eyes:JOHNY, EOMI  Ear/Nose/Throat: no oral lesions, 	  Respiratory: clear BL  Cardiovascular: S1S2  Gastrointestinal:soft, (+) BS, no tenderness  Extremities:no e/e/c  No Lymphadenopathy  IV sites not inflammed.    LABS:                        10.0   12.94 )-----------( 252      ( 28 Jun 2022 00:19 )             30.0     06-28    133<L>  |  101  |  49<H>  ----------------------------<  143<H>  4.6   |  21<L>  |  1.65<H>    Ca    9.0      28 Jun 2022 00:18  Phos  2.0     06-28  Mg     2.0     06-28    TPro  5.6<L>  /  Alb  3.6  /  TBili  0.6  /  DBili  x   /  AST  16  /  ALT  24  /  AlkPhos  50  06-28    PT/INR - ( 28 Jun 2022 00:19 )   PT: 12.8 sec;   INR: 1.10 ratio         PTT - ( 28 Jun 2022 00:19 )  PTT:37.1 sec      MICROBIOLOGY:            RECENT CULTURES:  06-21 @ 23:09  .Tissue Other  Staphylococcus epidermidis  Morganella morganii  Staphylococcus epidermidis  ANABELL    Culture is being performed.  --      RADIOLOGY & ADDITIONAL STUDIES:    < from: [facility] Duplex Upper Ext Vein Scan, Bilat (06.28.22 @ 15:34) >  IMPRESSION:    There is acute, partially occlusive thrombus affecting the left   subclavian vein.    The right basilic and cephalic veins, superficial veins, are again   demonstrated to be thrombosed.    < end of copied text >   INFECTIOUS DISEASES FOLLOW UP-- Arabella Escalante  [phone]    This is a follow up note for this  66yMale with  Cardiomyopathy  s/p heart transplant        ROS:  CONSTITUTIONAL:  No fever, good appetite  CARDIOVASCULAR:  No chest pain or palpitations  RESPIRATORY:  No dyspnea  GASTROINTESTINAL:  No nausea, vomiting, diarrhea, or abdominal pain  GENITOURINARY:  No dysuria  NEUROLOGIC:  No headache,     Allergies    No Known Allergies    Intolerances        ANTIBIOTICS/RELEVANT:  antimicrobials  atovaquone  Suspension 1500 milliGRAM(s) Oral daily  cefepime   IVPB 1000 milliGRAM(s) IV Intermittent every 8 hours  nystatin    Suspension 478263 Unit(s) Oral <User Schedule>  vancomycin    Solution 125 milliGRAM(s) Oral every 12 hours  vancomycin  IVPB 500 milliGRAM(s) IV Intermittent <User Schedule>    immunologic:  mycophenolate mofetil 1000 milliGRAM(s) Oral every 12 hours  tacrolimus 5 milliGRAM(s) Oral <User Schedule>    OTHER:  acetaminophen     Tablet .. 650 milliGRAM(s) Oral every 6 hours  chlorhexidine 2% Cloths 1 Application(s) Topical <User Schedule>  dextrose 50% Injectable 50 milliLiter(s) IV Push every 15 minutes  furosemide   Injectable 40 milliGRAM(s) IV Push daily  gabapentin 100 milliGRAM(s) Oral two times a day  hydrALAZINE 25 milliGRAM(s) Oral every 8 hours  insulin regular Infusion 3 Unit(s)/Hr IV Continuous <Continuous>  methylPREDNISolone sodium succinate Injectable 28 milliGRAM(s) IV Push every 12 hours  milrinone Infusion 0.1 MICROgram(s)/kG/Min IV Continuous <Continuous>  multivitamin 1 Tablet(s) Oral daily  pantoprazole    Tablet 40 milliGRAM(s) Oral before breakfast  polyethylene glycol 3350 17 Gram(s) Oral daily  sodium chloride 0.9%. 1000 milliLiter(s) IV Continuous <Continuous>  traMADol 25 milliGRAM(s) Oral every 8 hours PRN      Objective:  Vital Signs Last 24 Hrs  T(C): 36.4 (28 Jun 2022 16:00), Max: 36.6 (28 Jun 2022 12:00)  T(F): 97.6 (28 Jun 2022 16:00), Max: 97.8 (28 Jun 2022 12:00)  HR: 97 (28 Jun 2022 17:00) (88 - 99)  BP: 123/73 (28 Jun 2022 16:00) (114/62 - 152/90)  BP(mean): 91 (28 Jun 2022 16:00) (82 - 115)  RR: 28 (28 Jun 2022 16:00) (14 - 32)  SpO2: 97% (28 Jun 2022 17:00) (94% - 98%)    PHYSICAL EXAM:  Constitutional:no acute distress  Eyes:JOHNY, EOMI  Ear/Nose/Throat: no oral lesions, 	  Respiratory: clear BL  left upper chest old ICD site  chest tubes   Cardiovascular: S1S2 sternotomy site CDI  Gastrointestinal:soft, (+) BS, no tenderness  Extremities:no e/e/c  No Lymphadenopathy  IV sites not inflammed.    LABS:                        10.0   12.94 )-----------( 252      ( 28 Jun 2022 00:19 )             30.0     06-28    133<L>  |  101  |  49<H>  ----------------------------<  143<H>  4.6   |  21<L>  |  1.65<H>    Ca    9.0      28 Jun 2022 00:18  Phos  2.0     06-28  Mg     2.0     06-28    TPro  5.6<L>  /  Alb  3.6  /  TBili  0.6  /  DBili  x   /  AST  16  /  ALT  24  /  AlkPhos  50  06-28    PT/INR - ( 28 Jun 2022 00:19 )   PT: 12.8 sec;   INR: 1.10 ratio         PTT - ( 28 Jun 2022 00:19 )  PTT:37.1 sec      MICROBIOLOGY:            RECENT CULTURES:  06-21 @ 23:09  .Tissue Other  Staphylococcus epidermidis  Morganella morganii  Staphylococcus epidermidis  ANABELL    Culture is being performed.  --      RADIOLOGY & ADDITIONAL STUDIES:    < from: [facility] Duplex Upper Ext Vein Scan, Bilat (06.28.22 @ 15:34) >  IMPRESSION:    There is acute, partially occlusive thrombus affecting the left   subclavian vein.    The right basilic and cephalic veins, superficial veins, are again   demonstrated to be thrombosed.    < end of copied text >

## 2022-06-28 NOTE — PROGRESS NOTE ADULT - ASSESSMENT
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 LHC did not reveal new obstructive CAD and he underwent EPS which did not reveal endocardial substrate amenable for ablation. RHC revealed severely depressed cardiac output and he was transferred to [facility] 5/26 for advanced therapies evaluation.    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 He improved significantly with sequential uptitration of inotropes and increased pacing rate, but on 6/6 he had worsening JAGRUTI. He is s/p RHC which revealed severely elevated filling pressures, severe cpcPH, and CI of 1.9 on milrinone 0.5. IABP was placed and his renal function/PAPs have improved. He is MCS dependent and was inadequately supported with high dose inotropes, so was listed UNOS status 2e for OHT, awaiting suitable donor. However, was made status 7 as he became febrile, last 6/7 T 38. He has been afebrile since and blood cultures from 6/7 with NGTD x 48 hours and his leukocytosis has not worsened. Discussed with transplant ID and since bld cx negative x48hrs he has been re-listed UNOS status 2e.     A suitable donor was identified and patient underwent OHT with Dr. Earl/Maria C on 6/21 (ischemic time 261 mins, CMV +/+, Toxo -/-). Patient was successfully extubated and IABP was removed on POD 1. Off Maricruz. He's overall progressing well. His volume status is overall improving though his renal function has taken a slight hit. Cultures from his ICD site in the OR are returning positive for staph epidermidis/ morganella morganii, remains on IV abx. His most recent RHC showed elevated PA pressures and slightly elevated filling pressures, will continue to optimize his medications.           RHC/EMBx  6/28: RA 9, RV 3/11, PA 62/27/41, wedge 21 with v wave 31, PA sat 69.3%, /81/102, CO/Ci 6.87/3.62      Review of studies  TTE 5/21: LV 5.2 cm, LVEF 10-15%, LVOT VTI 10 cm, moderate RV dysfunction, mild AI, minimal MR  EKG: a-BiV paced  LHC 5/23: patent mLAD stent with slow flow, D1 with 40-50% stenosis, mild disease otherwise  RHC 5/26: RA 12, PA 70/40 (50), PCWP 22, Milana CO/CI 2.3/1.3, MAP 83 with SVR 2469, PVR 12 PERSAUD    Hemodynamics  5/27 (milrinone 0.25): RA 10, PA 76/35 (49), PCWP 34, PA sat 57% with Milana CO/CI 3.1/1.6, MAP 71 with SVR 1574, PVR 4.8  5/28 (on milrinone 0.375): RA 7, PA 63/31, PCWP 26, PA sat 72.8% with Milana CO/CI 4.9/2.5 (CI later dropped to 1.6 in the afternoon), MAP 70 with SVR 1039, PVR 2.9  5/29 (on milrinone 0.5): RA 8, PA 75/32 (50), PCWP 28, PA sat 74% with Milana CO/CI 5.0/2.6, MAP 77 with SVR 1200, PVR 4.4  5/29 (on milrinone 0.5 and nipride to 3 mcg/kg/min): RA 6, PA 59/31 (40), PCWP 30, PA sat 79% with Milana CO/CI 7.0/3.6, BP 97/57 (MAP 70) with , PVR 1.4  6/6 RHC (mil 0.5): RA 11 (v13), RV 75/17, PA 80/36/52, PCWP 24 (v29), PA sat 59.5%, CO/CI (F) 3.58/1.88  6/7 (mil 0.5, IABP 1:1): CVP 5, PA 66/32/45, PA sat 65.7%, CO/CI (F) 4.0/2.1, SVR 2000  6/8 (mil 0.5, IABP 1:1): CVP 1, PA 46/19/28, PA sat 72.7%, CO/CI (F) 5.6/2.9, SVR 1257  6/8 PM (mil 0.5, IABP 1:1): CVP 4, PA 68/25/38, PA sat 68%, CO/CI (f) 5/2.6, SVR 1326

## 2022-06-28 NOTE — PROGRESS NOTE ADULT - NS ATTEND AMEND GEN_ALL_CORE FT
s/p OHT on 6/21  cont milrinone 0.1mcg/kg/min  cont  lasix 40 daily  cont hydralazine 25mg TID, add procardia xl 30mg po if MAP > 90     S/p RHC,   RA 9, PA: 62/27(41), PCWP: 21 with V wave 31, PA sat 69% Milana CO/CI: 6.87/3.62, SVR 1094 BP: 141/81(102), PVVR: 3.35  IS: tacrolimus increased to 5mg BID, level 7.6  EMBx 6/28 1R/2, C$d pending,   TTE with normal LV function, RV dilated  daily trough levels  cont cellcept 1gm BID  steroid osmany tomorrow     OI PPX: CMV +/+, Toxo -/-  will start po valcyte when stable  mepron for PJP ppx  cont nystatin s/S    ICD pocket fluid cx + for morganella and staph epi  cont vanco and cefepime  follow levels    JAGRUTI  creat improved today  cont monitor    ambulate with PT  IS

## 2022-06-28 NOTE — PROGRESS NOTE ADULT - SUBJECTIVE AND OBJECTIVE BOX
CRITICAL CARE ATTENDING - CTICU    MEDICATIONS  (STANDING):  acetaminophen     Tablet .. 650 milliGRAM(s) Oral every 6 hours  cefepime   IVPB 1000 milliGRAM(s) IV Intermittent every 8 hours  chlorhexidine 2% Cloths 1 Application(s) Topical <User Schedule>  dextrose 50% Injectable 50 milliLiter(s) IV Push every 15 minutes  furosemide   Injectable 40 milliGRAM(s) IV Push daily  gabapentin 100 milliGRAM(s) Oral two times a day  hydrALAZINE 10 milliGRAM(s) Oral every 8 hours  insulin regular Infusion 3 Unit(s)/Hr (3 mL/Hr) IV Continuous <Continuous>  methylPREDNISolone sodium succinate Injectable 28 milliGRAM(s) IV Push every 12 hours  milrinone Infusion 0.1 MICROgram(s)/kG/Min (2.19 mL/Hr) IV Continuous <Continuous>  multivitamin 1 Tablet(s) Oral daily  mycophenolate mofetil 1000 milliGRAM(s) Oral every 12 hours  nystatin    Suspension 269260 Unit(s) Oral <User Schedule>  pantoprazole    Tablet 40 milliGRAM(s) Oral before breakfast  polyethylene glycol 3350 17 Gram(s) Oral daily  sodium chloride 0.9%. 1000 milliLiter(s) (10 mL/Hr) IV Continuous <Continuous>  tacrolimus 4 milliGRAM(s) Oral <User Schedule>  vancomycin    Solution 125 milliGRAM(s) Oral every 12 hours  vancomycin  IVPB 500 milliGRAM(s) IV Intermittent <User Schedule>                                    10.0   12.94 )-----------( 252      ( 2022 00:19 )             30.0       -    133<L>  |  101  |  49<H>  ----------------------------<  143<H>  4.6   |  21<L>  |  1.65<H>    Ca    9.0      2022 00:18  Phos  2.0     -  Mg     2.0     -    TPro  5.6<L>  /  Alb  3.6  /  TBili  0.6  /  DBili  x   /  AST  16  /  ALT  24  /  AlkPhos  50  06-28      PT/INR - ( 2022 00:19 )   PT: 12.8 sec;   INR: 1.10 ratio         PTT - ( 2022 00:19 )  PTT:37.1 sec        Daily     Daily Weight in k.5 (2022 14:00)       @ 07:01  -   @ 07:00  --------------------------------------------------------  IN: 1610.6 mL / OUT: 2540 mL / NET: -929.4 mL        Critically Ill patient  : [ ] preoperative ,   [x] post operative    Requires :  [x] Arterial Line   [] Central Line  [ ] PA catheter  [ ] IABP  [ ] ECMO  [ ] LVAD  [ ] Ventilator  [x] pacemaker- TPM [ ] Impella                       [x ] ABG's     [ x] Pulse Oxymetry Monitoring  Bedside evaluation , monitoring , treatment of hemodynamics , fluids , IVP/ IVCD meds.        Diagnosis:     POD 7- Heart Transplant  & AICD removal     Cardiogenic Shock - post op - resolving      CHF- acute [ x]   chronic [ ]    systolic [ x]   diatolic [x ]          - Echo- EF -  post op           [ ] RV dysfunction          - Cxr-cardiomegally, edema          - Clinical-  [x ]inotropes   [ ]pressors   [x ]diuresis   [ ]IABP   [ ]ECMO   [ ]LVAD   [ ] ventilator     Chest tube drainage     Requires chest PT, pulmonary toilet, suctioning to maintain SaO2,  patent airway and treat atelectasis.     IABP   [x ] removed on   and f/u vascular checks      Temporary pacemaker (TPM) interrogation and setting.     Hemodynamic lability,  instability. Requires IVCD [ ] vasopressors [x] inotropes  [x ] vasodilator  [x] IVSS fluid  to maintain MAP, perfusion, C.I.     Hypotension     Immunosuppressive therapy    DM2- IVCD insulin     Renal Failure - Acute Kidney Injury / CKD     Fluid overload     NPO for biopsy today     Requires bedside physical therapy, mobilization and total shelter care.               I, Julius Kinney, personally performed the services described in this documentation. All medical record entries made by the scribe were at my direction and in my presence. I have reviewed the chart and agree that the record reflects my personal performance and is accurate and complete.   Julius Kinney MD.       By signing my name below, I, Camila Martinez, attest that this documentation has been prepared under the direction and in the presence of Julius Kinney MD.   Electronically Signed: Mini Lassiter 22 @ 07:55        Discussed with CT surgeon, Physician Assistant - Nurse Practitioner- Critical care medicine team.   Dicussed at  AM / PM rounds.   Chart, labs , films reviewed.    Total Time:  CRITICAL CARE ATTENDING - CTICU    MEDICATIONS  (STANDING):  acetaminophen     Tablet .. 650 milliGRAM(s) Oral every 6 hours  cefepime   IVPB 1000 milliGRAM(s) IV Intermittent every 8 hours  chlorhexidine 2% Cloths 1 Application(s) Topical <User Schedule>  dextrose 50% Injectable 50 milliLiter(s) IV Push every 15 minutes  furosemide   Injectable 40 milliGRAM(s) IV Push daily  gabapentin 100 milliGRAM(s) Oral two times a day  hydrALAZINE 10 milliGRAM(s) Oral every 8 hours  insulin regular Infusion 3 Unit(s)/Hr (3 mL/Hr) IV Continuous <Continuous>  methylPREDNISolone sodium succinate Injectable 28 milliGRAM(s) IV Push every 12 hours  milrinone Infusion 0.1 MICROgram(s)/kG/Min (2.19 mL/Hr) IV Continuous <Continuous>  multivitamin 1 Tablet(s) Oral daily  mycophenolate mofetil 1000 milliGRAM(s) Oral every 12 hours  nystatin    Suspension 048532 Unit(s) Oral <User Schedule>  pantoprazole    Tablet 40 milliGRAM(s) Oral before breakfast  polyethylene glycol 3350 17 Gram(s) Oral daily  sodium chloride 0.9%. 1000 milliLiter(s) (10 mL/Hr) IV Continuous <Continuous>  tacrolimus 4 milliGRAM(s) Oral <User Schedule>  vancomycin    Solution 125 milliGRAM(s) Oral every 12 hours  vancomycin  IVPB 500 milliGRAM(s) IV Intermittent <User Schedule>                                    10.0   12.94 )-----------( 252      ( 2022 00:19 )             30.0       -    133<L>  |  101  |  49<H>  ----------------------------<  143<H>  4.6   |  21<L>  |  1.65<H>    Ca    9.0      2022 00:18  Phos  2.0     -  Mg     2.0     -    TPro  5.6<L>  /  Alb  3.6  /  TBili  0.6  /  DBili  x   /  AST  16  /  ALT  24  /  AlkPhos  50  06-28      PT/INR - ( 2022 00:19 )   PT: 12.8 sec;   INR: 1.10 ratio         PTT - ( 2022 00:19 )  PTT:37.1 sec        Daily     Daily Weight in k.5 (2022 14:00)       @ 07:01  -   @ 07:00  --------------------------------------------------------  IN: 1610.6 mL / OUT: 2540 mL / NET: -929.4 mL        Critically Ill patient  : [ ] preoperative ,   [x] post operative    Requires :  [x] Arterial Line   [] Central Line  [ ] PA catheter  [ ] IABP  [ ] ECMO  [ ] LVAD  [ ] Ventilator  [x] pacemaker- TPM [ ] Impella                       [x ] ABG's     [ x] Pulse Oxymetry Monitoring  Bedside evaluation , monitoring , treatment of hemodynamics , fluids , IVP/ IVCD meds.        Diagnosis:     POD 7- Heart Transplant  & AICD removal     Cardiogenic Shock - post op - resolving      CHF- acute [ x]   chronic [ ]    systolic [ x]   diatolic [x ]          - Echo- EF -  post op           [ ] RV dysfunction          - Cxr-cardiomegally, edema          - Clinical-  [x ]inotropes   [ ]pressors   [x ]diuresis   [ ]IABP   [ ]ECMO   [ ]LVAD   [ ] ventilator     Chest tube drainage     Requires chest PT, pulmonary toilet, suctioning to maintain SaO2,  patent airway and treat atelectasis.     IABP   [x ] removed on   and f/u vascular checks      Temporary pacemaker (TPM) interrogation and setting.     Hemodynamic lability,  instability. Requires IVCD [ ] vasopressors [x] inotropes  [x ] vasodilator  [x] IVSS fluid  to maintain MAP, perfusion, C.I.     Hypotension     Immunosuppressive therapy    DM2- IVCD insulin     Renal Failure - Acute Kidney Injury / CKD     Fluid overload     NPO for biopsy today     Requires bedside physical therapy, mobilization and total senior living care.               I, Julius Kinney, personally performed the services described in this documentation. All medical record entries made by the scribe were at my direction and in my presence. I have reviewed the chart and agree that the record reflects my personal performance and is accurate and complete.   Julius Kinney MD.       By signing my name below, I, Camila Martinez, attest that this documentation has been prepared under the direction and in the presence of Julius Kinney MD.   Electronically Signed: Mini Lassiter 22 @ 07:55        Discussed with CT surgeon, Physician Assistant - Nurse Practitioner- Critical care medicine team.   Dicussed at  AM / PM rounds.   Chart, labs , films reviewed.    Total Time: 30 min

## 2022-06-28 NOTE — PROGRESS NOTE ADULT - ASSESSMENT
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 LHC did not reveal new obstructive CAD and he underwent EPS which did not reveal endocardial substrate amenable for ablation. RHC revealed severely depressed cardiac output and he was transferred to [facility] 5/26 for advanced therapies evaluation.    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 He improved significantly with sequential uptitration of inotropes and increased pacing rate, but on 6/6 he had worsening JAGRUTI. He is s/p RHC which revealed severely elevated filling pressures, severe cpcPH, and CI of 1.9 on milrinone 0.5. IABP was placed and his renal function/PAPs have improved. He is MCS dependent and was inadequately supported with high dose inotropes, so was listed UNOS status 2e for OHT, awaiting suitable donor. However, was made status 7 as he became febrile, last 6/7 T 38. He has been afebrile since and blood cultures from 6/7 with NGTD x 48 hours and his leukocytosis has not worsened. Discussed with transplant ID and since bld cx negative x48hrs he has been re-listed UNOS status 2e.     A suitable donor was identified and patient underwent OHT with Dr. Earl/Maria C on 6/21 (ischemic time 261 mins, CMV +/+, Toxo -/-). On POD 1 IABP was removed. Extubated afternoon 6/22    #Positive Culture Finding (Staph epi and Morganella)  Only in liquid media so could be contaminant but reasonable to cover given murky fluid noted around pocket at time of explant  --Continue Cefepime and Vancomycin  -- susceptibilities of Staph epi and Morganella suggest that in the future could simplify to Ceftriaxone plus Vancomycin      #Prophylactic Antibiotics    On PO Vancomycin    CMV:  intermediate risk  will eventually need valcyte once electrolytes and renal function stabilizes    toxo  low risk    PJP  will eventually need Bactrim or Mepron    thrush:  nystatin    Reggie Escalante MD  Can be called via Teams  After 5pm/weekends [phone]

## 2022-06-28 NOTE — PROGRESS NOTE ADULT - SUBJECTIVE AND OBJECTIVE BOX
Subjective: Patient seen and examined resting in bed. Schedule for RHC/EMBx today     Medications:  acetaminophen     Tablet .. 650 milliGRAM(s) Oral every 6 hours  cefepime   IVPB 1000 milliGRAM(s) IV Intermittent every 8 hours  chlorhexidine 2% Cloths 1 Application(s) Topical <User Schedule>  dextrose 50% Injectable 50 milliLiter(s) IV Push every 15 minutes  furosemide   Injectable 40 milliGRAM(s) IV Push daily  gabapentin 100 milliGRAM(s) Oral two times a day  hydrALAZINE 10 milliGRAM(s) Oral every 8 hours  insulin regular Infusion 3 Unit(s)/Hr IV Continuous <Continuous>  methylPREDNISolone sodium succinate Injectable 28 milliGRAM(s) IV Push every 12 hours  milrinone Infusion 0.1 MICROgram(s)/kG/Min IV Continuous <Continuous>  multivitamin 1 Tablet(s) Oral daily  mycophenolate mofetil 1000 milliGRAM(s) Oral every 12 hours  nystatin    Suspension 450328 Unit(s) Oral <User Schedule>  pantoprazole    Tablet 40 milliGRAM(s) Oral before breakfast  polyethylene glycol 3350 17 Gram(s) Oral daily  sodium chloride 0.9%. 1000 milliLiter(s) IV Continuous <Continuous>  tacrolimus 4 milliGRAM(s) Oral <User Schedule>  traMADol 25 milliGRAM(s) Oral every 8 hours PRN  vancomycin    Solution 125 milliGRAM(s) Oral every 12 hours  vancomycin  IVPB 500 milliGRAM(s) IV Intermittent <User Schedule>      ICU Vital Signs Last 24 Hrs  T(C): 35.8 (2022 04:00), Max: 36.4 (2022 12:00)  T(F): 96.5 (2022 04:00), Max: 97.6 (2022 12:00)  HR: 94 (2022 07:00) (88 - 97)  BP: 144/83 (2022 07:00) (133/74 - 144/83)  BP(mean): 108 (2022 07:00) (97 - 108)  ABP: 85/67 (2022 03:00) (77/58 - 180/176)  ABP(mean): 78 (2022 03:00) (32 - 173)  RR: 32 (2022 07:00) (10 - 32)  SpO2: 95% (2022 07:00) (93% - 97%)      Weight in k.5 ( @ 14:00)    I&O's Summary    2022 07:01  -  2022 07:00  --------------------------------------------------------  IN: 1610.6 mL / OUT: 2540 mL / NET: -929.4 mL      Physical Exam  General: No distress. Comfortable.  Neck: Neck supple. JVP not elevated. No masses  Chest: Clear to auscultation bilaterally, +CT x 3  CV: Normal S1 and S2. No murmurs, rub, or gallops. Radial pulses normal.  Abdomen: Soft, non-distended, non-tender  Neurology: Alert and oriented times three. Sensation intact       Labs:                        10.0   12.94 )-----------( 252      ( 2022 00:19 )             30.0         133<L>  |  101  |  49<H>  ----------------------------<  143<H>  4.6   |  21<L>  |  1.65<H>    Ca    9.0      2022 00:18  Phos  2.0       Mg     2.0         TPro  5.6<L>  /  Alb  3.6  /  TBili  0.6  /  DBili  x   /  AST  16  /  ALT  24  /  AlkPhos  50  06-28    PT/INR - ( 2022 00:19 )   PT: 12.8 sec;   INR: 1.10 ratio         PTT - ( 2022 00:19 )  PTT:37.1 sec        Oxygen Saturation, Mixed: 67.7 ( @ 10:52)

## 2022-06-28 NOTE — PROGRESS NOTE ADULT - PROBLEM SELECTOR PLAN 1
Pt. with hemodynamically mediated JAGRUTI after OHT, in the setting of renal venous congestion and medication induced (tacro, Vanco) Upon review of labs, Scr was 1.2 on 4/19/22 and was elevated at 1.8 on 5/24/22. SCr downtrended to 1.01 on 6/20/22. Pt underwent OHT on 6/21/22 and required IABP post operatively.  UA done on 6/8/22 showed trace blood and proteinuria. Scr uptrended to 1.23 on 6/23/22 with elevated PA pressures. SCr increased to 2.32. Pt also noted to have increased in tacro dose and vancomycin levels were on higher side. Vanco and diuretics were held on 6/27/22. SCr improved to 1.6 today. Now on IV lasix daily. Repeat UA and spot urine TP/Cr.    Pt is nonoliguric with 2.6L as documented. Vanco dose held today. titrate to keep CVP < 10. Labs reviewed. On milrinone gtt. No urgent indication for RRT. Monitor labs and urine output. Discussed CTU/HF team.

## 2022-06-29 DIAGNOSIS — E11.65 TYPE 2 DIABETES MELLITUS WITH HYPERGLYCEMIA: ICD-10-CM

## 2022-06-29 DIAGNOSIS — Z94.1 HEART TRANSPLANT STATUS: ICD-10-CM

## 2022-06-29 DIAGNOSIS — R73.9 HYPERGLYCEMIA, UNSPECIFIED: ICD-10-CM

## 2022-06-29 DIAGNOSIS — I10 ESSENTIAL (PRIMARY) HYPERTENSION: ICD-10-CM

## 2022-06-29 DIAGNOSIS — E78.5 HYPERLIPIDEMIA, UNSPECIFIED: ICD-10-CM

## 2022-06-29 LAB
ALBUMIN SERPL ELPH-MCNC: 3.4 G/DL — SIGNIFICANT CHANGE UP (ref 3.3–5)
ALP SERPL-CCNC: 45 U/L — SIGNIFICANT CHANGE UP (ref 40–120)
ALT FLD-CCNC: 27 U/L — SIGNIFICANT CHANGE UP (ref 10–45)
ANION GAP SERPL CALC-SCNC: 9 MMOL/L — SIGNIFICANT CHANGE UP (ref 5–17)
AST SERPL-CCNC: 15 U/L — SIGNIFICANT CHANGE UP (ref 10–40)
BASOPHILS # BLD AUTO: 0.04 K/UL — SIGNIFICANT CHANGE UP (ref 0–0.2)
BASOPHILS NFR BLD AUTO: 0.3 % — SIGNIFICANT CHANGE UP (ref 0–2)
BILIRUB SERPL-MCNC: 0.6 MG/DL — SIGNIFICANT CHANGE UP (ref 0.2–1.2)
BUN SERPL-MCNC: 47 MG/DL — HIGH (ref 7–23)
CALCIUM SERPL-MCNC: 8.9 MG/DL — SIGNIFICANT CHANGE UP (ref 8.4–10.5)
CHLORIDE SERPL-SCNC: 102 MMOL/L — SIGNIFICANT CHANGE UP (ref 96–108)
CO2 SERPL-SCNC: 22 MMOL/L — SIGNIFICANT CHANGE UP (ref 22–31)
CREAT SERPL-MCNC: 1.7 MG/DL — HIGH (ref 0.5–1.3)
EGFR: 44 ML/MIN/1.73M2 — LOW
EOSINOPHIL # BLD AUTO: 0 K/UL — SIGNIFICANT CHANGE UP (ref 0–0.5)
EOSINOPHIL NFR BLD AUTO: 0 % — SIGNIFICANT CHANGE UP (ref 0–6)
GLUCOSE BLDC GLUCOMTR-MCNC: 111 MG/DL — HIGH (ref 70–99)
GLUCOSE BLDC GLUCOMTR-MCNC: 112 MG/DL — HIGH (ref 70–99)
GLUCOSE BLDC GLUCOMTR-MCNC: 121 MG/DL — HIGH (ref 70–99)
GLUCOSE BLDC GLUCOMTR-MCNC: 122 MG/DL — HIGH (ref 70–99)
GLUCOSE BLDC GLUCOMTR-MCNC: 123 MG/DL — HIGH (ref 70–99)
GLUCOSE BLDC GLUCOMTR-MCNC: 125 MG/DL — HIGH (ref 70–99)
GLUCOSE BLDC GLUCOMTR-MCNC: 125 MG/DL — HIGH (ref 70–99)
GLUCOSE BLDC GLUCOMTR-MCNC: 129 MG/DL — HIGH (ref 70–99)
GLUCOSE BLDC GLUCOMTR-MCNC: 129 MG/DL — HIGH (ref 70–99)
GLUCOSE BLDC GLUCOMTR-MCNC: 138 MG/DL — HIGH (ref 70–99)
GLUCOSE BLDC GLUCOMTR-MCNC: 143 MG/DL — HIGH (ref 70–99)
GLUCOSE BLDC GLUCOMTR-MCNC: 146 MG/DL — HIGH (ref 70–99)
GLUCOSE BLDC GLUCOMTR-MCNC: 153 MG/DL — HIGH (ref 70–99)
GLUCOSE BLDC GLUCOMTR-MCNC: 154 MG/DL — HIGH (ref 70–99)
GLUCOSE BLDC GLUCOMTR-MCNC: 174 MG/DL — HIGH (ref 70–99)
GLUCOSE BLDC GLUCOMTR-MCNC: 181 MG/DL — HIGH (ref 70–99)
GLUCOSE BLDC GLUCOMTR-MCNC: 544 MG/DL — CRITICAL HIGH (ref 70–99)
GLUCOSE BLDC GLUCOMTR-MCNC: 96 MG/DL — SIGNIFICANT CHANGE UP (ref 70–99)
GLUCOSE BLDC GLUCOMTR-MCNC: 98 MG/DL — SIGNIFICANT CHANGE UP (ref 70–99)
GLUCOSE SERPL-MCNC: 120 MG/DL — HIGH (ref 70–99)
HCT VFR BLD CALC: 31.6 % — LOW (ref 39–50)
HGB BLD-MCNC: 10.5 G/DL — LOW (ref 13–17)
IMM GRANULOCYTES NFR BLD AUTO: 4.8 % — HIGH (ref 0–1.5)
INR BLD: 1.08 RATIO — SIGNIFICANT CHANGE UP (ref 0.88–1.16)
LYMPHOCYTES # BLD AUTO: 1.8 K/UL — SIGNIFICANT CHANGE UP (ref 1–3.3)
LYMPHOCYTES # BLD AUTO: 11.6 % — LOW (ref 13–44)
MAGNESIUM SERPL-MCNC: 1.9 MG/DL — SIGNIFICANT CHANGE UP (ref 1.6–2.6)
MCHC RBC-ENTMCNC: 28.8 PG — SIGNIFICANT CHANGE UP (ref 27–34)
MCHC RBC-ENTMCNC: 33.2 GM/DL — SIGNIFICANT CHANGE UP (ref 32–36)
MCV RBC AUTO: 86.8 FL — SIGNIFICANT CHANGE UP (ref 80–100)
MONOCYTES # BLD AUTO: 0.73 K/UL — SIGNIFICANT CHANGE UP (ref 0–0.9)
MONOCYTES NFR BLD AUTO: 4.7 % — SIGNIFICANT CHANGE UP (ref 2–14)
NEUTROPHILS # BLD AUTO: 12.16 K/UL — HIGH (ref 1.8–7.4)
NEUTROPHILS NFR BLD AUTO: 78.6 % — HIGH (ref 43–77)
NRBC # BLD: 0 /100 WBCS — SIGNIFICANT CHANGE UP (ref 0–0)
PHOSPHATE SERPL-MCNC: 2.3 MG/DL — LOW (ref 2.5–4.5)
PLATELET # BLD AUTO: 282 K/UL — SIGNIFICANT CHANGE UP (ref 150–400)
POTASSIUM SERPL-MCNC: 5 MMOL/L — SIGNIFICANT CHANGE UP (ref 3.5–5.3)
POTASSIUM SERPL-SCNC: 5 MMOL/L — SIGNIFICANT CHANGE UP (ref 3.5–5.3)
PROT SERPL-MCNC: 5.4 G/DL — LOW (ref 6–8.3)
PROTHROM AB SERPL-ACNC: 12.4 SEC — SIGNIFICANT CHANGE UP (ref 10.5–13.4)
RBC # BLD: 3.64 M/UL — LOW (ref 4.2–5.8)
RBC # FLD: 17.2 % — HIGH (ref 10.3–14.5)
SODIUM SERPL-SCNC: 133 MMOL/L — LOW (ref 135–145)
TACROLIMUS SERPL-MCNC: 16.7 NG/ML — SIGNIFICANT CHANGE UP
VANCOMYCIN TROUGH SERPL-MCNC: 6.2 UG/ML — LOW (ref 10–20)
WBC # BLD: 15.47 K/UL — HIGH (ref 3.8–10.5)
WBC # FLD AUTO: 15.47 K/UL — HIGH (ref 3.8–10.5)

## 2022-06-29 PROCEDURE — 71045 X-RAY EXAM CHEST 1 VIEW: CPT | Mod: 26,77

## 2022-06-29 PROCEDURE — 99233 SBSQ HOSP IP/OBS HIGH 50: CPT

## 2022-06-29 PROCEDURE — 99291 CRITICAL CARE FIRST HOUR: CPT

## 2022-06-29 PROCEDURE — 99232 SBSQ HOSP IP/OBS MODERATE 35: CPT | Mod: GC

## 2022-06-29 PROCEDURE — 99233 SBSQ HOSP IP/OBS HIGH 50: CPT | Mod: GC

## 2022-06-29 PROCEDURE — 99223 1ST HOSP IP/OBS HIGH 75: CPT

## 2022-06-29 PROCEDURE — 71045 X-RAY EXAM CHEST 1 VIEW: CPT | Mod: 26

## 2022-06-29 RX ORDER — FUROSEMIDE 40 MG
40 TABLET ORAL
Refills: 0 | Status: DISCONTINUED | OUTPATIENT
Start: 2022-06-29 | End: 2022-07-02

## 2022-06-29 RX ORDER — MAGNESIUM SULFATE 500 MG/ML
2 VIAL (ML) INJECTION ONCE
Refills: 0 | Status: COMPLETED | OUTPATIENT
Start: 2022-06-29 | End: 2022-06-29

## 2022-06-29 RX ORDER — FUROSEMIDE 40 MG
40 TABLET ORAL DAILY
Refills: 0 | Status: DISCONTINUED | OUTPATIENT
Start: 2022-06-29 | End: 2022-06-29

## 2022-06-29 RX ORDER — INSULIN GLARGINE 100 [IU]/ML
100 INJECTION, SOLUTION SUBCUTANEOUS AT BEDTIME
Qty: 15 | Refills: 5 | Status: DISCONTINUED | COMMUNITY
Start: 2022-06-29 | End: 2022-06-29

## 2022-06-29 RX ORDER — TACROLIMUS 5 MG/1
3 CAPSULE ORAL
Refills: 0 | Status: COMPLETED | OUTPATIENT
Start: 2022-06-29 | End: 2022-06-29

## 2022-06-29 RX ORDER — INSULIN ASPART 100 [IU]/ML
100 INJECTION, SOLUTION INTRAVENOUS; SUBCUTANEOUS
Qty: 15 | Refills: 5 | Status: DISCONTINUED | COMMUNITY
Start: 2022-06-29 | End: 2022-06-29

## 2022-06-29 RX ORDER — NIFEDIPINE 30 MG
30 TABLET, EXTENDED RELEASE 24 HR ORAL ONCE
Refills: 0 | Status: COMPLETED | OUTPATIENT
Start: 2022-06-29 | End: 2022-06-29

## 2022-06-29 RX ORDER — LIDOCAINE 4 G/100G
1 CREAM TOPICAL ONCE
Refills: 0 | Status: COMPLETED | OUTPATIENT
Start: 2022-06-29 | End: 2022-06-29

## 2022-06-29 RX ORDER — INSULIN LISPRO 100/ML
VIAL (ML) SUBCUTANEOUS AT BEDTIME
Refills: 0 | Status: DISCONTINUED | OUTPATIENT
Start: 2022-06-29 | End: 2022-07-08

## 2022-06-29 RX ORDER — ALBUMIN HUMAN 25 %
50 VIAL (ML) INTRAVENOUS
Refills: 0 | Status: COMPLETED | OUTPATIENT
Start: 2022-06-29 | End: 2022-06-29

## 2022-06-29 RX ORDER — BLOOD PRESSURE TEST KIT-LARGE
KIT MISCELLANEOUS
Qty: 1 | Refills: 0 | Status: ACTIVE | COMMUNITY
Start: 2022-06-29 | End: 1900-01-01

## 2022-06-29 RX ORDER — THERMOMETER,ORAL,GLASS MERCURY
EACH MISCELLANEOUS
Qty: 1 | Refills: 0 | Status: ACTIVE | COMMUNITY
Start: 2022-06-29 | End: 1900-01-01

## 2022-06-29 RX ORDER — NIFEDIPINE 30 MG
60 TABLET, EXTENDED RELEASE 24 HR ORAL DAILY
Refills: 0 | Status: DISCONTINUED | OUTPATIENT
Start: 2022-06-30 | End: 2022-07-08

## 2022-06-29 RX ORDER — ALLOPURINOL 100 MG/1
100 TABLET ORAL DAILY
Qty: 90 | Refills: 0 | Status: DISCONTINUED | COMMUNITY
Start: 2022-05-20 | End: 2022-06-29

## 2022-06-29 RX ORDER — NIFEDIPINE 30 MG
30 TABLET, EXTENDED RELEASE 24 HR ORAL DAILY
Refills: 0 | Status: DISCONTINUED | OUTPATIENT
Start: 2022-06-29 | End: 2022-06-29

## 2022-06-29 RX ORDER — HEPARIN SODIUM 5000 [USP'U]/ML
5000 INJECTION INTRAVENOUS; SUBCUTANEOUS EVERY 8 HOURS
Refills: 0 | Status: DISCONTINUED | OUTPATIENT
Start: 2022-06-29 | End: 2022-07-04

## 2022-06-29 RX ORDER — BLOOD-GLUCOSE METER
W/DEVICE KIT MISCELLANEOUS
Qty: 1 | Refills: 0 | Status: ACTIVE | COMMUNITY
Start: 2022-06-29 | End: 1900-01-01

## 2022-06-29 RX ORDER — FUROSEMIDE 40 MG/1
40 TABLET ORAL
Qty: 7 | Refills: 0 | Status: DISCONTINUED | COMMUNITY
Start: 2022-05-20 | End: 2022-06-29

## 2022-06-29 RX ORDER — INSULIN LISPRO 100/ML
VIAL (ML) SUBCUTANEOUS
Refills: 0 | Status: DISCONTINUED | OUTPATIENT
Start: 2022-06-29 | End: 2022-07-08

## 2022-06-29 RX ORDER — VALGANCICLOVIR 450 MG/1
450 TABLET, FILM COATED ORAL
Refills: 0 | Status: DISCONTINUED | OUTPATIENT
Start: 2022-06-29 | End: 2022-07-01

## 2022-06-29 RX ORDER — TACROLIMUS 5 MG/1
4 CAPSULE ORAL
Refills: 0 | Status: DISCONTINUED | OUTPATIENT
Start: 2022-06-30 | End: 2022-07-01

## 2022-06-29 RX ORDER — ATORVASTATIN CALCIUM 40 MG/1
40 TABLET, FILM COATED ORAL
Qty: 30 | Refills: 0 | Status: DISCONTINUED | COMMUNITY
Start: 2022-05-20 | End: 2022-06-29

## 2022-06-29 RX ORDER — CLOPIDOGREL 75 MG/1
75 TABLET, FILM COATED ORAL DAILY
Qty: 30 | Refills: 3 | Status: DISCONTINUED | COMMUNITY
Start: 2022-05-20 | End: 2022-06-29

## 2022-06-29 RX ADMIN — PANTOPRAZOLE SODIUM 40 MILLIGRAM(S): 20 TABLET, DELAYED RELEASE ORAL at 06:08

## 2022-06-29 RX ADMIN — Medication 30 MILLIGRAM(S): at 10:41

## 2022-06-29 RX ADMIN — Medication 100 MILLIGRAM(S): at 11:53

## 2022-06-29 RX ADMIN — Medication 25 MILLIGRAM(S): at 14:54

## 2022-06-29 RX ADMIN — LIDOCAINE 1 PATCH: 4 CREAM TOPICAL at 16:44

## 2022-06-29 RX ADMIN — MYCOPHENOLATE MOFETIL 1000 MILLIGRAM(S): 250 CAPSULE ORAL at 08:50

## 2022-06-29 RX ADMIN — CEFEPIME 100 MILLIGRAM(S): 1 INJECTION, POWDER, FOR SOLUTION INTRAMUSCULAR; INTRAVENOUS at 22:33

## 2022-06-29 RX ADMIN — Medication 25 GRAM(S): at 02:12

## 2022-06-29 RX ADMIN — TACROLIMUS 3 MILLIGRAM(S): 5 CAPSULE ORAL at 20:00

## 2022-06-29 RX ADMIN — Medication 25 MILLIGRAM(S): at 22:33

## 2022-06-29 RX ADMIN — LIDOCAINE 1 PATCH: 4 CREAM TOPICAL at 05:00

## 2022-06-29 RX ADMIN — Medication 50 MILLILITER(S): at 12:16

## 2022-06-29 RX ADMIN — LIDOCAINE 1 PATCH: 4 CREAM TOPICAL at 11:54

## 2022-06-29 RX ADMIN — CHLORHEXIDINE GLUCONATE 1 APPLICATION(S): 213 SOLUTION TOPICAL at 05:22

## 2022-06-29 RX ADMIN — Medication 500000 UNIT(S): at 16:43

## 2022-06-29 RX ADMIN — Medication 650 MILLIGRAM(S): at 00:57

## 2022-06-29 RX ADMIN — Medication 50 MILLILITER(S): at 13:40

## 2022-06-29 RX ADMIN — MILRINONE LACTATE 2.19 MICROGRAM(S)/KG/MIN: 1 INJECTION, SOLUTION INTRAVENOUS at 08:53

## 2022-06-29 RX ADMIN — HEPARIN SODIUM 5000 UNIT(S): 5000 INJECTION INTRAVENOUS; SUBCUTANEOUS at 00:00

## 2022-06-29 RX ADMIN — VALGANCICLOVIR 450 MILLIGRAM(S): 450 TABLET, FILM COATED ORAL at 11:53

## 2022-06-29 RX ADMIN — Medication 650 MILLIGRAM(S): at 05:00

## 2022-06-29 RX ADMIN — CEFEPIME 100 MILLIGRAM(S): 1 INJECTION, POWDER, FOR SOLUTION INTRAMUSCULAR; INTRAVENOUS at 14:54

## 2022-06-29 RX ADMIN — Medication 63.75 MILLIMOLE(S): at 04:00

## 2022-06-29 RX ADMIN — Medication 125 MILLIGRAM(S): at 20:09

## 2022-06-29 RX ADMIN — Medication 125 MILLIGRAM(S): at 08:50

## 2022-06-29 RX ADMIN — Medication 40 MILLIGRAM(S): at 05:07

## 2022-06-29 RX ADMIN — Medication 500000 UNIT(S): at 11:53

## 2022-06-29 RX ADMIN — Medication 25 MILLIGRAM(S): at 05:08

## 2022-06-29 RX ADMIN — ATOVAQUONE 1500 MILLIGRAM(S): 750 SUSPENSION ORAL at 11:53

## 2022-06-29 RX ADMIN — Medication 500000 UNIT(S): at 20:00

## 2022-06-29 RX ADMIN — Medication 40 MILLIGRAM(S): at 11:54

## 2022-06-29 RX ADMIN — Medication 50 MILLILITER(S): at 11:30

## 2022-06-29 RX ADMIN — HEPARIN SODIUM 5000 UNIT(S): 5000 INJECTION INTRAVENOUS; SUBCUTANEOUS at 16:43

## 2022-06-29 RX ADMIN — Medication 50 MILLILITER(S): at 13:39

## 2022-06-29 RX ADMIN — GABAPENTIN 100 MILLIGRAM(S): 400 CAPSULE ORAL at 05:08

## 2022-06-29 RX ADMIN — MYCOPHENOLATE MOFETIL 1000 MILLIGRAM(S): 250 CAPSULE ORAL at 20:00

## 2022-06-29 RX ADMIN — INSULIN HUMAN 3 UNIT(S)/HR: 100 INJECTION, SOLUTION SUBCUTANEOUS at 08:53

## 2022-06-29 RX ADMIN — Medication 24 MILLIGRAM(S): at 20:00

## 2022-06-29 RX ADMIN — TACROLIMUS 5 MILLIGRAM(S): 5 CAPSULE ORAL at 08:52

## 2022-06-29 RX ADMIN — HEPARIN SODIUM 5000 UNIT(S): 5000 INJECTION INTRAVENOUS; SUBCUTANEOUS at 23:19

## 2022-06-29 RX ADMIN — HEPARIN SODIUM 5000 UNIT(S): 5000 INJECTION INTRAVENOUS; SUBCUTANEOUS at 08:52

## 2022-06-29 RX ADMIN — Medication 1 TABLET(S): at 11:53

## 2022-06-29 RX ADMIN — Medication 24 MILLIGRAM(S): at 08:51

## 2022-06-29 RX ADMIN — CEFEPIME 100 MILLIGRAM(S): 1 INJECTION, POWDER, FOR SOLUTION INTRAMUSCULAR; INTRAVENOUS at 05:08

## 2022-06-29 RX ADMIN — Medication 500000 UNIT(S): at 08:51

## 2022-06-29 RX ADMIN — Medication 650 MILLIGRAM(S): at 00:27

## 2022-06-29 RX ADMIN — Medication 30 MILLIGRAM(S): at 02:40

## 2022-06-29 RX ADMIN — Medication 650 MILLIGRAM(S): at 05:30

## 2022-06-29 NOTE — PROGRESS NOTE ADULT - PROBLEM SELECTOR PLAN 3
- CMV +/+, will be started on Valcyte when feasible   - Toxo -/-, no ppx needed, remains on Mepron for PJP ppx  - periop antibiotics per CTS

## 2022-06-29 NOTE — PROGRESS NOTE ADULT - PROBLEM SELECTOR PLAN 1
- s/p OHT with Dr. Earl/Maria C on 6/21 (ischemic time 261 min)  - IABP removed on POD 1  - d/c primacor today   - adjust tacro Lasix 40 mg PO BID   - remains on hydralazine 25 mg PO TID and Procardia 30 mg PO QD

## 2022-06-29 NOTE — PROGRESS NOTE ADULT - PROBLEM SELECTOR PLAN 1
Pt. with hemodynamically mediated JARGUTI after OHT, in the setting of renal venous congestion and medication induced (tacro, Vanco) Upon review of labs, Scr was 1.2 on 4/19/22 and was elevated at 1.8 on 5/24/22. SCr downtrended to 1.01 on 6/20/22. Pt underwent OHT on 6/21/22 and required IABP post operatively.  UA done on 6/8/22 showed trace blood and proteinuria. Scr uptrended to 1.23 on 6/23/22 with elevated PA pressures. SCr increased to 2.32. Pt also noted to have increased in tacro dose and vancomycin levels were on higher side. Vanco and diuretics were held on 6/27/22. SCr stable/elevated at 1.7 today. Now on lasix daily. Repeat UA and spot urine TP/Cr.    Pt is nonoliguric with 2.6L as documented. Continue diuretics and titrate to keep CVP < 10. Labs reviewed. No urgent indication for RRT. Monitor labs and urine output.

## 2022-06-29 NOTE — PROGRESS NOTE ADULT - PROBLEM SELECTOR PLAN 2
will continue to adjust tacro as needed for goal 12-14  - continue with solumedrol taper per protocol   - remains on cellcept 1000 mg PO BID.    - CMV +/+, /valcyte  - Toxo -/-, no ppx needed, remains on Mepron for PJP ppx  - periop antibiotics per CTS.

## 2022-06-29 NOTE — PROGRESS NOTE ADULT - ASSESSMENT
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 LHC did not reveal new obstructive CAD and he underwent EPS which did not reveal endocardial substrate amenable for ablation. RHC revealed severely depressed cardiac output and he was transferred to [facility] 5/26 for advanced therapies evaluation.    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 He improved significantly with sequential uptitration of inotropes and increased pacing rate, but on 6/6 he had worsening JAGRUTI. He is s/p RHC which revealed severely elevated filling pressures, severe cpcPH, and CI of 1.9 on milrinone 0.5. IABP was placed and his renal function/PAPs have improved. He is MCS dependent and was inadequately supported with high dose inotropes, so was listed UNOS status 2e for OHT, awaiting suitable donor. However, was made status 7 as he became febrile, last 6/7 T 38. He has been afebrile since and blood cultures from 6/7 with NGTD x 48 hours and his leukocytosis has not worsened. Discussed with transplant ID and since bld cx negative x48hrs he has been re-listed UNOS status 2e.     A suitable donor was identified and patient underwent OHT with Dr. Earl/Maria C on 6/21 (ischemic time 261 mins, CMV +/+, Toxo -/-). Patient was successfully extubated and IABP was removed on POD 1. Off Maricruz. He's overall progressing well. His volume status is overall improving though his renal function has taken a slight hit. Cultures from his ICD site in the OR are returning positive for staph epidermidis/ morganella morganii, remains on IV abx. His most recent RHC showed elevated PA pressures and slightly elevated filling pressures, will continue to optimize his medications.           RHC/EMBx  6/28: RA 9, RV 3/11, PA 62/27/41, wedge 21 with v wave 31, PA sat 69.3%, /81/102, CO/Ci 6.87/3.62, grade 1R/2      Review of studies  TTE 5/21: LV 5.2 cm, LVEF 10-15%, LVOT VTI 10 cm, moderate RV dysfunction, mild AI, minimal MR  EKG: a-BiV paced  LHC 5/23: patent mLAD stent with slow flow, D1 with 40-50% stenosis, mild disease otherwise  RHC 5/26: RA 12, PA 70/40 (50), PCWP 22, Milana CO/CI 2.3/1.3, MAP 83 with SVR 2469, PVR 12 PERSAUD    Hemodynamics  5/27 (milrinone 0.25): RA 10, PA 76/35 (49), PCWP 34, PA sat 57% with Milana CO/CI 3.1/1.6, MAP 71 with SVR 1574, PVR 4.8  5/28 (on milrinone 0.375): RA 7, PA 63/31, PCWP 26, PA sat 72.8% with Milana CO/CI 4.9/2.5 (CI later dropped to 1.6 in the afternoon), MAP 70 with SVR 1039, PVR 2.9  5/29 (on milrinone 0.5): RA 8, PA 75/32 (50), PCWP 28, PA sat 74% with Milana CO/CI 5.0/2.6, MAP 77 with SVR 1200, PVR 4.4  5/29 (on milrinone 0.5 and nipride to 3 mcg/kg/min): RA 6, PA 59/31 (40), PCWP 30, PA sat 79% with Milana CO/CI 7.0/3.6, BP 97/57 (MAP 70) with , PVR 1.4  6/6 RHC (mil 0.5): RA 11 (v13), RV 75/17, PA 80/36/52, PCWP 24 (v29), PA sat 59.5%, CO/CI (F) 3.58/1.88  6/7 (mil 0.5, IABP 1:1): CVP 5, PA 66/32/45, PA sat 65.7%, CO/CI (F) 4.0/2.1, SVR 2000  6/8 (mil 0.5, IABP 1:1): CVP 1, PA 46/19/28, PA sat 72.7%, CO/CI (F) 5.6/2.9, SVR 1257  6/8 PM (mil 0.5, IABP 1:1): CVP 4, PA 68/25/38, PA sat 68%, CO/CI (f) 5/2.6, SVR 1326

## 2022-06-29 NOTE — CONSULT NOTE ADULT - SUBJECTIVE AND OBJECTIVE BOX
ENDOCRINE INITIAL CONSULT - Steroid induced hyperglycemia, dm2     HPI:  66M Mixed Ischemic/NICM Cardiomyopathy (EF 25-30% at baseline, s/p BIV-ICD), CAD (medically managed MIs in 2008,2011, Most recent stent in April 2022 to mLAD) presented with multiple near syncope, found to have 41 episodes of VT and admitted initially to cardiac telemetry for further evaluation/management. Ischemic evaluation without new disease and VT ablation without substrate to complete ablation.   Transferred from [facility] for further management and evaluation for LVAD vs OHT.    (26 May 2022 20:31)      ENDOCRINE HISTORY   DM2   home medication: glimepiride 1mg daily   A1c 6.2-6.7% range   SMBG  diet/ex  complications - CAD, CKD   family hx     thyroid dx?   on amiodarone, recent TSH/FT4 wnl     PAST MEDICAL & SURGICAL HISTORY:  AV block      Essential hypertension      Chronic HFrEF (heart failure with reduced ejection fraction)      Chronic kidney disease, unspecified CKD stage      CAD (coronary artery disease)      HLD (hyperlipidemia)      Type 2 diabetes mellitus      Artificial cardiac pacemaker          FAMILY HISTORY:  Family history of acute myocardial infarction (Father)  72        Social History:  Patient previously consumed 4 beers/day since being a teenager, quit last year. Patient is a former smoker, quit in 2006. (26 May 2022 20:31)      Home Medications:  allopurinol: 75 mg orally once a day (hospital) (01 Jun 2022 11:43)  allopurinol 100 mg oral tablet: 1 tab(s) orally once a day (home) (01 Jun 2022 11:43)  amiodarone 200 mg oral tablet: 2 tab(s) orally every 12 hours until 5/31, then 1 tab orally twice a day (hospital) (01 Jun 2022 11:43)  carvedilol 12.5 mg oral tablet: 2 tab(s) orally every 12 hours (home)    Note: Changed to metoprolol at [facility] (01 Jun 2022 11:43)  Entresto 24 mg-26 mg oral tablet: 1 tab(s) orally 2 times a day (home)    Note: Stopped at [facility] (01 Jun 2022 11:43)  glimepiride 1 mg oral tablet: 1 tab(s) orally once a day (home) (01 Jun 2022 11:43)  Lasix 40 mg oral tablet: 1 tab(s) orally once a day (home/hospital) (01 Jun 2022 11:43)  Lipitor 80 mg oral tablet: 1 tab(s) orally once a day (at bedtime) (hospital) (01 Jun 2022 11:43)  metoprolol succinate 25 mg oral tablet, extended release: 1 tab(s) orally every 12 hours (hospital) (01 Jun 2022 11:43)  pantoprazole 40 mg oral delayed release tablet: 1 tab(s) orally once a day (before a meal) (hospital) (01 Jun 2022 11:43)  spironolactone 25 mg oral tablet: 1 tab(s) orally once a day (hospital) (01 Jun 2022 11:43)  Vitamin D2 1.25 mg (50,000 intl units) oral capsule: 1 cap(s) orally once a week (home) (01 Jun 2022 11:43)      MEDICATIONS  (STANDING):  albumin human 25% IVPB 50 milliLiter(s) IV Intermittent every 30 minutes  atovaquone  Suspension 1500 milliGRAM(s) Oral daily  cefepime   IVPB 1000 milliGRAM(s) IV Intermittent every 8 hours  chlorhexidine 2% Cloths 1 Application(s) Topical <User Schedule>  furosemide    Tablet 40 milliGRAM(s) Oral daily  heparin   Injectable 5000 Unit(s) SubCutaneous every 8 hours  hydrALAZINE 25 milliGRAM(s) Oral every 8 hours  insulin regular Infusion 3 Unit(s)/Hr (3 mL/Hr) IV Continuous <Continuous>  methylPREDNISolone sodium succinate Injectable 24 milliGRAM(s) IV Push every 12 hours  multivitamin 1 Tablet(s) Oral daily  mycophenolate mofetil 1000 milliGRAM(s) Oral every 12 hours  nystatin    Suspension 284974 Unit(s) Oral <User Schedule>  pantoprazole    Tablet 40 milliGRAM(s) Oral before breakfast  polyethylene glycol 3350 17 Gram(s) Oral daily  tacrolimus 3 milliGRAM(s) Oral <User Schedule>  valGANciclovir 450 milliGRAM(s) Oral <User Schedule>  vancomycin    Solution 125 milliGRAM(s) Oral every 12 hours  vancomycin  IVPB 500 milliGRAM(s) IV Intermittent <User Schedule>    MEDICATIONS  (PRN):  traMADol 25 milliGRAM(s) Oral every 8 hours PRN Moderate Pain (4 - 6)      Allergies    No Known Allergies    Intolerances        REVIEW OF SYSTEMS  Constitutional: No fever  Eyes: No blurry vision  Neuro: No tremors  HEENT: No pain  Cardiovascular: No chest pain, palpitations  Respiratory: No SOB, no cough  GI: No nausea, vomiting, abdominal pain  : No dysuria  Skin: no rash  Psych: no depression  Endocrine: no polyuria, polydipsia  Hem/lymph: no swelling  Osteoporosis: no fractures  ALL OTHER SYSTEMS REVIEWED AND NEGATIVE     UNABLE TO OBTAIN     PHYSICAL EXAM   Vital Signs Last 24 Hrs  T(C): 36.2 (29 Jun 2022 11:00), Max: 36.8 (29 Jun 2022 00:00)  T(F): 97.2 (29 Jun 2022 11:00), Max: 98.2 (29 Jun 2022 00:00)  HR: 94 (29 Jun 2022 11:00) (87 - 100)  BP: 129/75 (29 Jun 2022 11:00) (114/62 - 166/89)  BP(mean): 96 (29 Jun 2022 11:00) (82 - 120)  RR: 20 (29 Jun 2022 11:00) (12 - 31)  SpO2: 95% (29 Jun 2022 11:00) (94% - 100%)  GENERAL: NAD, well-groomed, well-developed  EYES: No proptosis, no lid lag, anicteric  HEENT:  Atraumatic, Normocephalic, moist mucous membranes  THYROID: Normal size, no palpable nodules  RESPIRATORY: Clear to auscultation bilaterally; No rales, rhonchi, wheezing  CARDIOVASCULAR: Regular rate and rhythm; No murmurs; no peripheral edema  GI: Soft, nontender, non distended, normal bowel sounds  SKIN: Dry, intact, No rashes or lesions  MUSCULOSKELETAL: Full range of motion, normal strength  NEURO: sensation intact, extraocular movements intact, no tremor  PSYCH: Alert and oriented x 3, normal affect, normal mood  CUSHING'S SIGNS: no striae    CAPILLARY BLOOD GLUCOSE  174 (29 Jun 2022 10:00)  112 (29 Jun 2022 04:00)  98 (29 Jun 2022 02:00)  146 (28 Jun 2022 22:00)  139 (28 Jun 2022 21:00)  122 (28 Jun 2022 20:00)  164 (28 Jun 2022 19:00)  145 (28 Jun 2022 18:00)  175 (28 Jun 2022 17:00)  181 (28 Jun 2022 16:00)  185 (28 Jun 2022 15:00)  199 (28 Jun 2022 14:00)      POCT Blood Glucose.: 125 mg/dL (29 Jun 2022 12:50)  POCT Blood Glucose.: 122 mg/dL (29 Jun 2022 11:38)  POCT Blood Glucose.: 174 mg/dL (29 Jun 2022 10:16)  POCT Blood Glucose.: 154 mg/dL (29 Jun 2022 09:02)  POCT Blood Glucose.: 138 mg/dL (29 Jun 2022 08:17)  POCT Blood Glucose.: 143 mg/dL (29 Jun 2022 06:50)  POCT Blood Glucose.: 544 mg/dL (29 Jun 2022 06:39)  POCT Blood Glucose.: 125 mg/dL (29 Jun 2022 05:05)  POCT Blood Glucose.: 112 mg/dL (29 Jun 2022 04:18)  POCT Blood Glucose.: 129 mg/dL (29 Jun 2022 02:45)  POCT Blood Glucose.: 96 mg/dL (29 Jun 2022 02:04)  POCT Blood Glucose.: 153 mg/dL (29 Jun 2022 00:57)  POCT Blood Glucose.: 111 mg/dL (29 Jun 2022 00:23)  POCT Blood Glucose.: 149 mg/dL (28 Jun 2022 22:35)  POCT Blood Glucose.: 135 mg/dL (28 Jun 2022 21:13)  POCT Blood Glucose.: 122 mg/dL (28 Jun 2022 20:07)  POCT Blood Glucose.: 164 mg/dL (28 Jun 2022 18:52)  POCT Blood Glucose.: 145 mg/dL (28 Jun 2022 17:58)  POCT Blood Glucose.: 175 mg/dL (28 Jun 2022 16:49)  POCT Blood Glucose.: 181 mg/dL (28 Jun 2022 16:06)  POCT Blood Glucose.: 183 mg/dL (28 Jun 2022 15:04)  POCT Blood Glucose.: 199 mg/dL (28 Jun 2022 13:49)      A1C with Estimated Average Glucose Result: 6.2 % (05-20-22 @ 14:17)  A1C with Estimated Average Glucose Result: 6.9 % (04-18-22 @ 15:40)  A1C with Estimated Average Glucose Result: 6.7 % (04-18-22 @ 15:05)                            10.5   15.47 )-----------( 282      ( 29 Jun 2022 00:47 )             31.6       06-29    133<L>  |  102  |  47<H>  ----------------------------<  120<H>  5.0   |  22  |  1.70<H>    Ca    8.9      29 Jun 2022 00:47  Phos  2.3     06-29  Mg     1.9     06-29    TPro  5.4<L>  /  Alb  3.4  /  TBili  0.6  /  DBili  x   /  AST  15  /  ALT  27  /  AlkPhos  45  06-29      Thyroid Stimulating Hormone, Serum: 0.57 uIU/mL (06-22-22 @ 21:13)      LIPIDS    RADIOLOGY ENDOCRINE INITIAL CONSULT - Steroid induced hyperglycemia, dm2     HPI:  66M Mixed Ischemic/NICM Cardiomyopathy (EF 25-30% at baseline, s/p BIV-ICD), CAD (medically managed MIs in 2008,2011, Most recent stent in April 2022 to mLAD) presented with multiple near syncope, found to have 41 episodes of VT and admitted initially to cardiac telemetry for further evaluation/management. Ischemic evaluation without new disease and VT ablation without substrate to complete ablation.   Transferred from [facility] for further management and evaluation for LVAD vs OHT.    (26 May 2022 20:31)      ENDOCRINE HISTORY   DM2 dx about a month before, he says approximately May 2022.   Was started on glimepiride 1mg daily - has never been on Metformin  A1c 6.2-6.9% range on chart review   +family hx of DM2 in parents, believes they were only ever on tablets, not insulin but says this was many years ago  does not regularly monitor fs  States that he typically eats a healthy diet, low sodium, generally low carbohydrates but says he would like to speak with someone more regarding this/get a list of things to eat/foods to avoid, etc.   complications - CAD, CKD  prior to transplant - no neuropathy, no blurry vision, no increased/thirst urination    No prior hx of thyroid disease or known family hx of thyroid disease   on amiodarone, recent TSH/FT4 wnl     No other known endocrinopathies.     PAST MEDICAL & SURGICAL HISTORY:  AV block      Essential hypertension      Chronic HFrEF (heart failure with reduced ejection fraction)      Chronic kidney disease, unspecified CKD stage      CAD (coronary artery disease)      HLD (hyperlipidemia)      Type 2 diabetes mellitus      Artificial cardiac pacemaker          FAMILY HISTORY:  Family history of acute myocardial infarction (Father)  72        Social History:  Patient previously consumed 4 beers/day since being a teenager, quit last year. Patient is a former smoker, quit in 2006. (26 May 2022 20:31)      Home Medications:  allopurinol: 75 mg orally once a day (hospital) (01 Jun 2022 11:43)  allopurinol 100 mg oral tablet: 1 tab(s) orally once a day (home) (01 Jun 2022 11:43)  amiodarone 200 mg oral tablet: 2 tab(s) orally every 12 hours until 5/31, then 1 tab orally twice a day (hospital) (01 Jun 2022 11:43)  carvedilol 12.5 mg oral tablet: 2 tab(s) orally every 12 hours (home)    Note: Changed to metoprolol at [facility] (01 Jun 2022 11:43)  Entresto 24 mg-26 mg oral tablet: 1 tab(s) orally 2 times a day (home)    Note: Stopped at [facility] (01 Jun 2022 11:43)  glimepiride 1 mg oral tablet: 1 tab(s) orally once a day (home) (01 Jun 2022 11:43)  Lasix 40 mg oral tablet: 1 tab(s) orally once a day (home/hospital) (01 Jun 2022 11:43)  Lipitor 80 mg oral tablet: 1 tab(s) orally once a day (at bedtime) (hospital) (01 Jun 2022 11:43)  metoprolol succinate 25 mg oral tablet, extended release: 1 tab(s) orally every 12 hours (hospital) (01 Jun 2022 11:43)  pantoprazole 40 mg oral delayed release tablet: 1 tab(s) orally once a day (before a meal) (hospital) (01 Jun 2022 11:43)  spironolactone 25 mg oral tablet: 1 tab(s) orally once a day (hospital) (01 Jun 2022 11:43)  Vitamin D2 1.25 mg (50,000 intl units) oral capsule: 1 cap(s) orally once a week (home) (01 Jun 2022 11:43)      MEDICATIONS  (STANDING):  albumin human 25% IVPB 50 milliLiter(s) IV Intermittent every 30 minutes  atovaquone  Suspension 1500 milliGRAM(s) Oral daily  cefepime   IVPB 1000 milliGRAM(s) IV Intermittent every 8 hours  chlorhexidine 2% Cloths 1 Application(s) Topical <User Schedule>  furosemide    Tablet 40 milliGRAM(s) Oral daily  heparin   Injectable 5000 Unit(s) SubCutaneous every 8 hours  hydrALAZINE 25 milliGRAM(s) Oral every 8 hours  insulin regular Infusion 3 Unit(s)/Hr (3 mL/Hr) IV Continuous <Continuous>  methylPREDNISolone sodium succinate Injectable 24 milliGRAM(s) IV Push every 12 hours  multivitamin 1 Tablet(s) Oral daily  mycophenolate mofetil 1000 milliGRAM(s) Oral every 12 hours  nystatin    Suspension 631562 Unit(s) Oral <User Schedule>  pantoprazole    Tablet 40 milliGRAM(s) Oral before breakfast  polyethylene glycol 3350 17 Gram(s) Oral daily  tacrolimus 3 milliGRAM(s) Oral <User Schedule>  valGANciclovir 450 milliGRAM(s) Oral <User Schedule>  vancomycin    Solution 125 milliGRAM(s) Oral every 12 hours  vancomycin  IVPB 500 milliGRAM(s) IV Intermittent <User Schedule>    MEDICATIONS  (PRN):  traMADol 25 milliGRAM(s) Oral every 8 hours PRN Moderate Pain (4 - 6)      Allergies    No Known Allergies    Intolerances        REVIEW OF SYSTEMS  Constitutional: No fever  Eyes: No blurry vision  Neuro: No tremors  HEENT: No pain  Cardiovascular: No chest pain, palpitations  Respiratory: No SOB, no cough  GI: No nausea, vomiting, abdominal pain  : No dysuria  Skin: no rash  Psych: no depression  Endocrine: no polyuria, polydipsia  Hem/lymph: + LE swelling  Osteoporosis: no fractures  ALL OTHER SYSTEMS REVIEWED AND NEGATIVE       PHYSICAL EXAM   Vital Signs Last 24 Hrs  T(C): 36.2 (29 Jun 2022 11:00), Max: 36.8 (29 Jun 2022 00:00)  T(F): 97.2 (29 Jun 2022 11:00), Max: 98.2 (29 Jun 2022 00:00)  HR: 94 (29 Jun 2022 11:00) (87 - 100)  BP: 129/75 (29 Jun 2022 11:00) (114/62 - 166/89)  BP(mean): 96 (29 Jun 2022 11:00) (82 - 120)  RR: 20 (29 Jun 2022 11:00) (12 - 31)  SpO2: 95% (29 Jun 2022 11:00) (94% - 100%)  GENERAL: NAD, well-groomed, well-developed  EYES: No proptosis, no lid lag, anicteric  HEENT:  Atraumatic, Normocephalic, moist mucous membranes  THYROID: Normal size, no palpable nodules, nontender   RESPIRATORY: Clear to auscultation bilaterally; nonlabored respirations   CARDIOVASCULAR: RRR, sternotomy wound, + peripheral edema  GI: Soft, nontender, non distended, normal bowel sounds  SKIN: Dry, intact, No rashes or lesions  MUSCULOSKELETAL: Full range of motion, normal strength  NEURO: sensation intact, extraocular movements intact, no tremor  PSYCH: Alert and oriented x 3, reactive affect/mood   CUSHING'S SIGNS: no striae    CAPILLARY BLOOD GLUCOSE  174 (29 Jun 2022 10:00)  112 (29 Jun 2022 04:00)  98 (29 Jun 2022 02:00)  146 (28 Jun 2022 22:00)  139 (28 Jun 2022 21:00)  122 (28 Jun 2022 20:00)  164 (28 Jun 2022 19:00)  145 (28 Jun 2022 18:00)  175 (28 Jun 2022 17:00)  181 (28 Jun 2022 16:00)  185 (28 Jun 2022 15:00)  199 (28 Jun 2022 14:00)      POCT Blood Glucose.: 125 mg/dL (29 Jun 2022 12:50)  POCT Blood Glucose.: 122 mg/dL (29 Jun 2022 11:38)  POCT Blood Glucose.: 174 mg/dL (29 Jun 2022 10:16)  POCT Blood Glucose.: 154 mg/dL (29 Jun 2022 09:02)  POCT Blood Glucose.: 138 mg/dL (29 Jun 2022 08:17)  POCT Blood Glucose.: 143 mg/dL (29 Jun 2022 06:50)  POCT Blood Glucose.: 544 mg/dL (29 Jun 2022 06:39)  POCT Blood Glucose.: 125 mg/dL (29 Jun 2022 05:05)  POCT Blood Glucose.: 112 mg/dL (29 Jun 2022 04:18)  POCT Blood Glucose.: 129 mg/dL (29 Jun 2022 02:45)  POCT Blood Glucose.: 96 mg/dL (29 Jun 2022 02:04)  POCT Blood Glucose.: 153 mg/dL (29 Jun 2022 00:57)  POCT Blood Glucose.: 111 mg/dL (29 Jun 2022 00:23)  POCT Blood Glucose.: 149 mg/dL (28 Jun 2022 22:35)  POCT Blood Glucose.: 135 mg/dL (28 Jun 2022 21:13)  POCT Blood Glucose.: 122 mg/dL (28 Jun 2022 20:07)  POCT Blood Glucose.: 164 mg/dL (28 Jun 2022 18:52)  POCT Blood Glucose.: 145 mg/dL (28 Jun 2022 17:58)  POCT Blood Glucose.: 175 mg/dL (28 Jun 2022 16:49)  POCT Blood Glucose.: 181 mg/dL (28 Jun 2022 16:06)  POCT Blood Glucose.: 183 mg/dL (28 Jun 2022 15:04)  POCT Blood Glucose.: 199 mg/dL (28 Jun 2022 13:49)    A1C with Estimated Average Glucose Result: 6.2 % (05-20-22 @ 14:17)  A1C with Estimated Average Glucose Result: 6.9 % (04-18-22 @ 15:40)  A1C with Estimated Average Glucose Result: 6.7 % (04-18-22 @ 15:05)                            10.5   15.47 )-----------( 282      ( 29 Jun 2022 00:47 )             31.6       06-29    133<L>  |  102  |  47<H>  ----------------------------<  120<H>  5.0   |  22  |  1.70<H>    Ca    8.9      29 Jun 2022 00:47  Phos  2.3     06-29  Mg     1.9     06-29    TPro  5.4<L>  /  Alb  3.4  /  TBili  0.6  /  DBili  x   /  AST  15  /  ALT  27  /  AlkPhos  45  06-29      Thyroid Stimulating Hormone, Serum: 0.57 uIU/mL (06-22-22 @ 21:13)      LIPIDS    RADIOLOGY

## 2022-06-29 NOTE — CONSULT NOTE ADULT - ASSESSMENT
66M male with PMHx HTN, HLD, type 2 DM, chronic HFrEF,  (EF 25-30% by Echo) who underwent OHT on 6/21/22, currently on high dose steroid taper. Endocrinology consulted for assistance with management of steroid induced hyperglycemia/dm2.     Steroid induced hyperglycemia   DM2, previously well controlled   Currently on Methlprednisolone 24mg IV q12h  6/30 Methylprednisolone 20mg IV q12h  7/1 Prednisone 20mg q12h  7/2 Prednisone 15mg q12h   Recommendations:   - Lantus SC QHS   - Admelog SC Premeal/TIDAC   - Admelog Correction Scale Premeal & Correction Scale Bedtime   - Goal -180  - Blood glucose monitoring Premeal/Bedtime   - Hypoglycemia protocol   - Carb Consistent Diet   - Nutrition consult   - Provider to RN for dm/insulin teaching   DC Planning: basal/bolus, doses tbd, depending on insulin requirements & steroid doses at the time of discharge. Routine optho/podiatry evaluations after discharge. Patient can follow up with endocrinology at the location provided below:     [facility]   [address] [facility] [address] [facility] [address]  [facility] [address]  [phone]    HTN  Recommendations:   - outpt BP goal < 130/80   - defer to primary team   - outpatient annual urinary microalb/cr ratio    HLD  Recommendations:   - LDL goal < 70   - defer to primary team   - outpatient fasting lipid profile     Discussed w/ Dr. Dahiana Shaw, DO   Endocrine Fellow  For follow up questions, discharge recommendations, or new consults please call answering service at [phone] (weekdays), [phone] (nights/weekends). For nonurgent matters, please email [email or web address] or [email or web address]    66M male with PMHx HTN, HLD, type 2 DM, chronic HFrEF,  (EF 25-30% by Echo) who underwent OHT on 6/21/22, currently on high dose steroid taper. Endocrinology consulted for assistance with management of steroid induced hyperglycemia/dm2.     Steroid induced hyperglycemia   DM2, controlled, recently dx  Currently on Methlprednisolone 24mg IV q12h  6/30 Methylprednisolone 20mg IV q12h  7/1 Prednisone 20mg q12h  7/2 Prednisone 15mg q12h   Only on Glimepiride 1mg daily - recently started ~ May 2022  Recommendations:   - Lantus SC QHS   - Admelog SC Premeal/TIDAC   - Admelog Correction Scale Premeal & Correction Scale Bedtime   - Goal -180  - Blood glucose monitoring Premeal/Bedtime   - Hypoglycemia protocol   - Carb Consistent Diet   - Nutrition consult   - Provider to RN for dm/insulin teaching   DC Planning: basal/bolus insulin pens, doses tbd, depending on insulin requirements & steroid doses at the time of discharge.   Pt will need RX for basal insulin pen (ie. Basaglar, Lantus, Tresiba, Toujeo, Levemir) and bolus insulin pen(ie. humalog/novolog/admelog) depending on insurance coverage; please send test scripts to see which is covered. Please send prescriptions for diabetes supplies (glucometer, test strips, lancets, alcohol swabs, insulin pen needles). Routine outpatient opthalmology & podiatry evaluations recommended. Patient can follow up with endocrinology at the location provided below:     [facility]   [address] [address]  [phone]    HTN  Recommendations:   - outpt BP goal < 130/80   - defer to primary team   - outpatient annual urinary microalb/cr ratio    HLD  Recommendations:   - LDL goal < 70   - defer to primary team   - outpatient fasting lipid profile     Discussed w/ Dr. Dahiana Shaw DO   Endocrine Fellow  For follow up questions, discharge recommendations, or new consults please call answering service at [phone] (weekdays), [phone] (nights/weekends). For nonurgent matters, please email [email or web address] or [email or web address]    66M male with PMHx HTN, HLD, type 2 DM, chronic HFrEF,  (EF 25-30% by Echo) who underwent OHT on 6/21/22, currently on high dose steroid taper. Endocrinology consulted for assistance with management of steroid induced hyperglycemia/dm2.     Steroid induced hyperglycemia   DM2, controlled, recently dx  Currently on Methlprednisolone 24mg IV q12h  6/30 Methylprednisolone 20mg IV q12h  7/1 Prednisone 20mg q12h  7/2 Prednisone 15mg q12h   Only on Glimepiride 1mg daily - recently started ~ May 2022  insulin ggt running at a rate of 7 units per hour when seen around 2pm  Recommendations:   - continue managing BG levels with insulin ggt at this time. given patient is still on high dose steroids and with high insulin ggt requirements, would not transition to basal/bolus regimen at this time - especially since he is very recently s/p OHT. May consider transitioning at a later time once his BG levels are more steady/insulin ggt requirements are less.   - Goal -180  - FS Blood glucose monitoring q1h while on insulin ggt   - Hypoglycemia protocol   - Carb Consistent Diet   - Nutrition consult   - Provider to RN for dm/insulin teaching   DC Planning: basal/bolus insulin pens, doses tbd, depending on insulin requirements & steroid doses at the time of discharge.   Pt will need RX for basal insulin pen (ie. Basaglar, Lantus, Tresiba, Toujeo, Levemir) and bolus insulin pen(ie. humalog/novolog/admelog) depending on insurance coverage; please send test scripts to see which is covered. Please send prescriptions for diabetes supplies (glucometer, test strips, lancets, alcohol swabs, insulin pen needles). Routine outpatient opthalmology & podiatry evaluations recommended. Patient can follow up with endocrinology at the location provided below:     [facility]   [address] [facility] [address]  [facility] [address]  [phone]    HTN  Recommendations:   - outpt BP goal < 130/80   - defer to primary team   - outpatient annual urinary microalb/cr ratio    HLD  Recommendations:   - LDL goal < 70   - defer to primary team   - outpatient fasting lipid profile     Discussed w/ Dr. Ifeanyi Shaw, DO   Endocrine Fellow  For follow up questions, discharge recommendations, or new consults please call answering service at [phone] (weekdays), [phone] (nights/weekends). For nonurgent matters, please email [email or web address] or [email or web address]    66M male with PMHx HTN, HLD, type 2 DM, chronic HFrEF,  (EF 25-30% by Echo) who underwent OHT on 6/21/22, currently on high dose steroid taper. Endocrinology consulted for assistance with management of steroid induced hyperglycemia/dm2.     Steroid induced hyperglycemia   DM2, controlled, recently dx  Currently on Methlprednisolone 24mg IV q12h  6/30 Methylprednisolone 20mg IV q12h  7/1 Prednisone 20mg q12h  7/2 Prednisone 15mg q12h   Only on Glimepiride 1mg daily - recently started ~ May 2022  insulin ggt running at a rate of 7 units per hour when seen around 2pm  Recommendations:   - continue managing BG levels with insulin ggt at this time. given patient is still on high dose steroids and with high insulin ggt requirements, would not transition to basal/bolus regimen at this time - especially since he is very recently s/p OHT. May consider transitioning at a later time once his BG levels are more steady/insulin ggt requirements are less.   - Goal -180  - FS Blood glucose monitoring q1h while on insulin ggt   - Hypoglycemia protocol   - Carb Consistent Diet   - Nutrition consult   - Provider to RN for dm/insulin teaching   DC Planning: oral regimen vs basal/bolus insulin pens, tbd, depending on insulin requirements & steroid doses at the time of discharge.   Please send test scripts for basal insulin pen (ie. Basaglar, Lantus, Tresiba, Toujeo, Levemir) and bolus insulin pen(ie. humalog/novolog/admelog) to check for insurance coverage Please send prescriptions for diabetes supplies (glucometer, test strips, lancets, alcohol swabs, insulin pen needles). Routine outpatient opthalmology & podiatry evaluations recommended. Patient can follow up with endocrinology at the location provided below:     [facility]   [address] [facility] [address]  [facility] [address]  [phone]    HTN  Recommendations:   - outpt BP goal < 130/80   - defer to primary team   - outpatient annual urinary microalb/cr ratio    HLD  Recommendations:   - LDL goal < 70   - defer to primary team   - outpatient fasting lipid profile     Discussed w/ Dr. Ifeanyi Shaw, DO   Endocrine Fellow  For follow up questions, discharge recommendations, or new consults please call answering service at [phone] (weekdays), [phone] (nights/weekends). For nonurgent matters, please email [email or web address] or [email or web address]    66M male with PMHx HTN, HLD, type 2 DM, chronic HFrEF,  (EF 25-30% by Echo) who underwent OHT on 6/21/22, currently on high dose steroid taper. Endocrinology consulted for assistance with management of steroid induced hyperglycemia/dm2.     Steroid induced hyperglycemia   DM2, controlled, recently dx  Currently on Methlprednisolone 24mg IV q12h  6/30 Methylprednisolone 20mg IV q12h  7/1 Prednisone 20mg q12h  7/2 Prednisone 15mg q12h   Only on Glimepiride 1mg daily - recently started ~ May 2022  insulin ggt running at a rate of 7 units per hour when seen around 2pm  Recommendations:   - continue managing BG levels with insulin ggt at this time. given patient is still on high dose steroids and with high insulin ggt requirements, would not transition to basal/bolus regimen at this time - especially since he is very recently s/p OHT. May consider transitioning at a later time once his BG levels are more steady/insulin ggt requirements are less.   - Goal -180  - FS Blood glucose monitoring q1h while on insulin ggt   - Hypoglycemia protocol   - Carb Consistent Diet   - Nutrition consult   - Provider to RN for dm/insulin teaching   DC Planning: oral regimen vs basal/bolus insulin pens, tbd, depending on insulin requirements & steroid doses at the time of discharge.   Please send test scripts for basal insulin pen (ie. Basaglar, Lantus, Tresiba, Toujeo, Levemir) and bolus insulin pen(ie. humalog/novolog/admelog) to check for insurance coverage Please send prescriptions for diabetes supplies (glucometer, test strips, lancets, alcohol swabs, insulin pen needles). Routine outpatient opthalmology & podiatry evaluations recommended. Patient can follow up with endocrinology at the location provided below:     [facility]   [address] [facility] [address]  [facility] [address]  [phone]    HTN  Recommendations:   - outpt BP goal < 130/80   - defer to primary team   - outpatient annual urinary microalb/cr ratio    HLD  Recommendations:   - LDL goal < 70   - defer to primary team   - outpatient fasting lipid profile     recs discussed w/ primary team AMANDA Nuñez.     Discussed w/ Dr. Ifeanyi Shaw DO   Endocrine Fellow  For follow up questions, discharge recommendations, or new consults please call answering service at [phone] (weekdays), [phone] (nights/weekends). For nonurgent matters, please email [email or web address] or [email or web address]

## 2022-06-29 NOTE — PROGRESS NOTE ADULT - ASSESSMENT
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 5/20 with near syncope in setting of worsening HF symptoms and found to have 41 episodes of VT, many terminating with ATP. LHC did not reveal new obstructive CAD and he underwent EPS which did not reveal endocardial substrate amenable for ablation. RHC revealed severely depressed cardiac output and he was transferred to [facility] 5/26 for advanced therapies evaluation. Pt was evaluated bu transplant ID for potential OHT. He became febrile, last 6/7 T 38. He has been afebrile since and blood cultures from 6/7 with NGTD x 48 hours and his leukocytosis has not worsened. A suitable donor was identified and patient underwent OHT with Dr. Earl/Maria C on 6/21 (ischemic time 261 mins, CMV +/+, Toxo -/-). On POD 1 IABP was removed. Extubated afternoon 6/22. Cultures from his ICD site in the OR are returning positive for staph epidermidis/ morganella morganii, remains on IV abx.     WORKUP  Surgical Swab of AICD pocket (6/21): MRSE and Morganella morganii  VA Duplex Upper Ext Vein Scan, Bilat (06.28): There is acute, partially occlusive thrombus affecting the left subclavian vein. The right basilic and cephalic veins, superficial veins, are again  demonstrated to be thrombosed.   Xray Chest (06.27): Bibasilar atelectasis. No significant interval change.  Cytomegalovirus By PCR: NotDete (06-26    DIAGNOSIS and IMPRESSION  # AICD Positive Culture Finding (MRSE and Morganella)  # OHT 6/21 (CMV +/+, Toxo -/-)  # JAGRUTI/CKD   Afebrile, uptrending leukocytosis to 15k  Only in liquid media so could be contaminant but reasonable to cover given murky fluid noted around pocket at time of explant  Vanc T 20 on 6/26 and Vanc dose was held but became subtherapeutic on 6/28 and 6/29 -> Restarted yesterday at 500mg BID  Susceptibilities of Staph epi and Morganella suggest that in the future could simplify to Ceftriaxone plus Vancomycin    RECOMMENDATIONS  Continue Cefepime 1gm Q8 and Vancomycin  CMV intermediate risk: c/w valcyte  toxo low risk: no PPx  PJP: c/w Mepron  Thrush: Nystatin  On PO Vancomycin as per CTU ppx protocol    PT TO BE SEEN. PRELIM NOTE  PENDING RECS. 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 LHC did not reveal new obstructive CAD and he underwent EPS which did not reveal endocardial substrate amenable for ablation. RHC revealed severely depressed cardiac output and he was transferred to [facility] 5/26 for advanced therapies evaluation. Pt was evaluated bu transplant ID for potential OHT. He became febrile, last 6/7 T 38. He has been afebrile since and blood cultures from 6/7 with NGTD x 48 hours and his leukocytosis has not worsened. A suitable donor was identified and patient underwent OHT with Dr. Earl/Maria C on 6/21 (ischemic time 261 mins, CMV +/+, Toxo -/-). On POD 1 IABP was removed. Extubated afternoon 6/22. Cultures from his ICD site in the OR are returning positive for staph epidermidis/ morganella morganii, remains on IV abx.     WORKUP  Surgical Swab of AICD pocket (6/21): MRSE and Morganella morganii  VA Duplex Upper Ext Vein Scan, Bilat (06.28): There is acute, partially occlusive thrombus affecting the left subclavian vein. The right basilic and cephalic veins, superficial veins, are again  demonstrated to be thrombosed.   Xray Chest (06.27): Bibasilar atelectasis. No significant interval change.  Cytomegalovirus By PCR: NotDete (06-26    DIAGNOSIS and IMPRESSION  # AICD Positive Culture Finding (MRSE and Morganella)  # OHT 6/21 (CMV +/+, Toxo -/-)  # JAGRUTI/CKD   Afebrile, uptrending leukocytosis to 15k  Only in liquid media so could be contaminant but reasonable to cover given murky fluid noted around pocket at time of explant  Vanc T 20 on 6/26 and Vanc dose was held but became subtherapeutic on 6/28 and 6/29 -> Restarted yesterday at 500mg BID  Susceptibilities of Staph epi and Morganella suggest that in the future could simplify to Ceftriaxone plus Vancomycin    RECOMMENDATIONS  Continue Cefepime 1gm Q8 and Vancomycin -> Can switch to cefpodoxime and Doxy at time of discharge  CMV intermediate risk: c/w valcyte  toxo low risk: no PPx  PJP: c/w Mepron  Thrush: Nystatin  On PO Vancomycin as per CTU ppx protocol    Pt seen and examined. Case d/w attending and primary team    Jamin Morales MD, PGY4   ID fellow  [employer] Teams Preferred  After 5pm/weekends call [phone]

## 2022-06-29 NOTE — PROGRESS NOTE ADULT - PROBLEM SELECTOR PLAN 1
·  Plan: - s/p OHT with Dr. Earl/Maria C on 6/21 (ischemic time 261 min)  - IABP removed on POD 1  - d/c primacor today 6/29  - adjust tacro.   Lasix 40 mg PO BID 6/29  - remains on hydralazine 25 mg PO TID and Procardia increased to 60mg PO QD.

## 2022-06-29 NOTE — PROGRESS NOTE ADULT - SUBJECTIVE AND OBJECTIVE BOX
VITAL SIGNS    Telemetry:      Vital Signs Last 24 Hrs  T(C): 36.2 (22 @ 11:00), Max: 36.8 (22 @ 00:00)  T(F): 97.2 (22 @ 11:00), Max: 98.2 (22 @ 00:00)  HR: 94 (22 @ 11:00) (87 - 100)  BP: 129/75 (22 @ 11:00) (114/62 - 166/89)  RR: 20 (22 @ 11:00) (12 - 31)  SpO2: 95% (22 @ 11:00) (94% - 100%)                    @ 07:01  -   @ 07:00  --------------------------------------------------------  IN: 1621.6 mL / OUT: 2605 mL / NET: -983.4 mL     @ 07:01  -   @ 13:26  --------------------------------------------------------  IN: 857.4 mL / OUT: 1065 mL / NET: -207.6 mL          Daily     Daily Weight in k.4 (2022 00:00)            CAPILLARY BLOOD GLUCOSE  174 (2022 10:00)  112 (2022 04:00)  98 (2022 02:00)  146 (2022 22:00)  139 (2022 21:00)  122 (2022 20:00)  164 (2022 19:00)  145 (2022 18:00)  175 (2022 17:00)  181 (2022 16:00)  185 (2022 15:00)  199 (2022 14:00)      POCT Blood Glucose.: 125 mg/dL (2022 12:50)  POCT Blood Glucose.: 122 mg/dL (2022 11:38)  POCT Blood Glucose.: 174 mg/dL (2022 10:16)  POCT Blood Glucose.: 154 mg/dL (2022 09:02)  POCT Blood Glucose.: 138 mg/dL (2022 08:17)  POCT Blood Glucose.: 143 mg/dL (2022 06:50)  POCT Blood Glucose.: 544 mg/dL (2022 06:39)  POCT Blood Glucose.: 125 mg/dL (2022 05:05)  POCT Blood Glucose.: 112 mg/dL (2022 04:18)  POCT Blood Glucose.: 129 mg/dL (2022 02:45)  POCT Blood Glucose.: 96 mg/dL (2022 02:04)  POCT Blood Glucose.: 153 mg/dL (2022 00:57)  POCT Blood Glucose.: 111 mg/dL (2022 00:23)  POCT Blood Glucose.: 149 mg/dL (2022 22:35)  POCT Blood Glucose.: 135 mg/dL (2022 21:13)  POCT Blood Glucose.: 122 mg/dL (2022 20:07)  POCT Blood Glucose.: 164 mg/dL (2022 18:52)  POCT Blood Glucose.: 145 mg/dL (2022 17:58)  POCT Blood Glucose.: 175 mg/dL (2022 16:49)  POCT Blood Glucose.: 181 mg/dL (2022 16:06)  POCT Blood Glucose.: 183 mg/dL (2022 15:04)  POCT Blood Glucose.: 199 mg/dL (2022 13:49)            Drains:     MS         [  ] Drainage:                 L Pleural  [  ]  Drainage:                R Pleural  [  ]  Drainage:    Pacing Wires        [  ]   Settings:                                  Isolated  [  ]    Coumadin    [ ] YES          [  ]      NO                                   PHYSICAL EXAM        Neurology: alert and oriented x 3, nonfocal, no gross deficits  CV : s1 s2 RRR  Sternal Wound :  CDI , Stable  Lungs: cta  Abdomen: soft, nontender, nondistended, positive bowel sounds, last bowel movement +                      chest tubes x 3  :    voiding     Extremities:    trace   edema   /  -   calve tenderness ,   L groin stitch          albumin human 25% IVPB 50 milliLiter(s) IV Intermittent every 30 minutes  atovaquone  Suspension 1500 milliGRAM(s) Oral daily  cefepime   IVPB 1000 milliGRAM(s) IV Intermittent every 8 hours  chlorhexidine 2% Cloths 1 Application(s) Topical <User Schedule>  furosemide    Tablet 40 milliGRAM(s) Oral daily  heparin   Injectable 5000 Unit(s) SubCutaneous every 8 hours  hydrALAZINE 25 milliGRAM(s) Oral every 8 hours  insulin regular Infusion 3 Unit(s)/Hr IV Continuous <Continuous>  methylPREDNISolone sodium succinate Injectable 24 milliGRAM(s) IV Push every 12 hours  multivitamin 1 Tablet(s) Oral daily  mycophenolate mofetil 1000 milliGRAM(s) Oral every 12 hours  nystatin    Suspension 727489 Unit(s) Oral <User Schedule>  pantoprazole    Tablet 40 milliGRAM(s) Oral before breakfast  polyethylene glycol 3350 17 Gram(s) Oral daily  tacrolimus 3 milliGRAM(s) Oral <User Schedule>  traMADol 25 milliGRAM(s) Oral every 8 hours PRN  valGANciclovir 450 milliGRAM(s) Oral <User Schedule>  vancomycin    Solution 125 milliGRAM(s) Oral every 12 hours  vancomycin  IVPB 500 milliGRAM(s) IV Intermittent <User Schedule>                    Physical Therapy Rec:   Home  [  ]   Home w/ PT  [  ]  Rehab  [  ]  Discussed with Cardiothoracic Team at AM rounds.     VITAL SIGNS    Telemetry:  nsr 98    Vital Signs Last 24 Hrs  T(C): 36.2 (22 @ 11:00), Max: 36.8 (22 @ 00:00)  T(F): 97.2 (22 @ 11:00), Max: 98.2 (22 @ 00:00)  HR: 94 (22 @ 11:00) (87 - 100)  BP: 129/75 (22 @ 11:00) (114/62 - 166/89)  RR: 20 (22 @ 11:00) (12 - 31)  SpO2: 95% (22 @ 11:00) (94% - 100%)                    @ 07:01  -   @ 07:00  --------------------------------------------------------  IN: 1621.6 mL / OUT: 2605 mL / NET: -983.4 mL     @ 07:01  -   @ 13:26  --------------------------------------------------------  IN: 857.4 mL / OUT: 1065 mL / NET: -207.6 mL          Daily     Daily Weight in k.4 (2022 00:00)            CAPILLARY BLOOD GLUCOSE  174 (2022 10:00)  112 (2022 04:00)  98 (2022 02:00)  146 (2022 22:00)  139 (2022 21:00)  122 (2022 20:00)  164 (2022 19:00)  145 (2022 18:00)  175 (2022 17:00)  181 (2022 16:00)  185 (2022 15:00)  199 (2022 14:00)      POCT Blood Glucose.: 125 mg/dL (2022 12:50)  POCT Blood Glucose.: 122 mg/dL (2022 11:38)  POCT Blood Glucose.: 174 mg/dL (2022 10:16)  POCT Blood Glucose.: 154 mg/dL (2022 09:02)  POCT Blood Glucose.: 138 mg/dL (2022 08:17)  POCT Blood Glucose.: 143 mg/dL (2022 06:50)  POCT Blood Glucose.: 544 mg/dL (2022 06:39)  POCT Blood Glucose.: 125 mg/dL (2022 05:05)  POCT Blood Glucose.: 112 mg/dL (2022 04:18)  POCT Blood Glucose.: 129 mg/dL (2022 02:45)  POCT Blood Glucose.: 96 mg/dL (2022 02:04)  POCT Blood Glucose.: 153 mg/dL (2022 00:57)  POCT Blood Glucose.: 111 mg/dL (2022 00:23)  POCT Blood Glucose.: 149 mg/dL (2022 22:35)  POCT Blood Glucose.: 135 mg/dL (2022 21:13)  POCT Blood Glucose.: 122 mg/dL (2022 20:07)  POCT Blood Glucose.: 164 mg/dL (2022 18:52)  POCT Blood Glucose.: 145 mg/dL (2022 17:58)  POCT Blood Glucose.: 175 mg/dL (2022 16:49)  POCT Blood Glucose.: 181 mg/dL (2022 16:06)  POCT Blood Glucose.: 183 mg/dL (2022 15:04)  POCT Blood Glucose.: 199 mg/dL (2022 13:49)            Drains:     MS         [ x ] Drainage: x 3                   Pacing Wires       Coumadin    [ ] YES          [x  ]      NO                                   PHYSICAL EXAM        Neurology: alert and oriented x 3, nonfocal, no gross deficits  CV : s1 s2 RRR  Sternal Wound :  CDI , Stable  Lungs: cta  Abdomen: soft, nontender, nondistended, positive bowel sounds, last bowel movement +                      chest tubes x 3  :    voiding     Extremities:    trace   edema   /  -   calve tenderness ,   L groin stitch          albumin human 25% IVPB 50 milliLiter(s) IV Intermittent every 30 minutes  atovaquone  Suspension 1500 milliGRAM(s) Oral daily  cefepime   IVPB 1000 milliGRAM(s) IV Intermittent every 8 hours  chlorhexidine 2% Cloths 1 Application(s) Topical <User Schedule>  furosemide    Tablet 40 milliGRAM(s) Oral daily  heparin   Injectable 5000 Unit(s) SubCutaneous every 8 hours  hydrALAZINE 25 milliGRAM(s) Oral every 8 hours  insulin regular Infusion 3 Unit(s)/Hr IV Continuous <Continuous>  methylPREDNISolone sodium succinate Injectable 24 milliGRAM(s) IV Push every 12 hours  multivitamin 1 Tablet(s) Oral daily  mycophenolate mofetil 1000 milliGRAM(s) Oral every 12 hours  nystatin    Suspension 163809 Unit(s) Oral <User Schedule>  pantoprazole    Tablet 40 milliGRAM(s) Oral before breakfast  polyethylene glycol 3350 17 Gram(s) Oral daily  tacrolimus 3 milliGRAM(s) Oral <User Schedule>  traMADol 25 milliGRAM(s) Oral every 8 hours PRN  valGANciclovir 450 milliGRAM(s) Oral <User Schedule>  vancomycin    Solution 125 milliGRAM(s) Oral every 12 hours  vancomycin  IVPB 500 milliGRAM(s) IV Intermittent <User Schedule>                    Physical Therapy Rec:   Home  [  ]   Home w/ PT  [  ]  Rehab  [  ]  Discussed with Cardiothoracic Team at AM rounds.

## 2022-06-29 NOTE — PROGRESS NOTE ADULT - SUBJECTIVE AND OBJECTIVE BOX
Subjective: Patient seen and examined resting in chair.    Medications:  atovaquone  Suspension 1500 milliGRAM(s) Oral daily  cefepime   IVPB 1000 milliGRAM(s) IV Intermittent every 8 hours  chlorhexidine 2% Cloths 1 Application(s) Topical <User Schedule>  furosemide   Injectable 40 milliGRAM(s) IV Push daily  heparin   Injectable 5000 Unit(s) SubCutaneous every 8 hours  hydrALAZINE 25 milliGRAM(s) Oral every 8 hours  insulin regular Infusion 3 Unit(s)/Hr IV Continuous <Continuous>  methylPREDNISolone sodium succinate Injectable 24 milliGRAM(s) IV Push every 12 hours  milrinone Infusion 0.1 MICROgram(s)/kG/Min IV Continuous <Continuous>  multivitamin 1 Tablet(s) Oral daily  mycophenolate mofetil 1000 milliGRAM(s) Oral every 12 hours  NIFEdipine XL 30 milliGRAM(s) Oral daily  nystatin    Suspension 584465 Unit(s) Oral <User Schedule>  pantoprazole    Tablet 40 milliGRAM(s) Oral before breakfast  polyethylene glycol 3350 17 Gram(s) Oral daily  sodium chloride 0.9%. 1000 milliLiter(s) IV Continuous <Continuous>  tacrolimus 5 milliGRAM(s) Oral <User Schedule>  traMADol 25 milliGRAM(s) Oral every 8 hours PRN  vancomycin    Solution 125 milliGRAM(s) Oral every 12 hours  vancomycin  IVPB 500 milliGRAM(s) IV Intermittent <User Schedule>      ICU Vital Signs Last 24 Hrs  T(C): 36.8 (2022 04:00), Max: 36.8 (2022 00:00)  T(F): 98.2 (2022 04:00), Max: 98.2 (2022 00:00)  HR: 90 (2022 07:00) (87 - 99)  BP: 146/82 (2022 06:00) (114/62 - 166/89)  BP(mean): 106 (2022 06:00) (82 - 120)  ABP: --  ABP(mean): --  RR: 19 (2022 07:00) (12 - 31)  SpO2: 96% (2022 07:00) (94% - 100%)      Weight in k.4 ( @ 00:00)    I&O's Summary    2022 07:01  -  2022 07:00  --------------------------------------------------------  IN: 1621.6 mL / OUT: 2605 mL / NET: -983.4 mL        Physical Exam  General: No distress. Comfortable.  HEENT: EOM intact.  Neck: Neck supple. JVP not elevated. No masses  Chest: Clear to auscultation bilaterally, +CT x 3  CV: Normal S1 and S2. No murmurs, rub, or gallops. Radial pulses normal.  Abdomen: Soft, non-distended, non-tender  Neurology: Alert and oriented times three.     Labs:                        10.5   15.47 )-----------( 282      ( 2022 00:47 )             31.6     06-29    133<L>  |  102  |  47<H>  ----------------------------<  120<H>  5.0   |  22  |  1.70<H>    Ca    8.9      2022 00:47  Phos  2.3     06-  Mg     1.9     29    TPro  5.4<L>  /  Alb  3.4  /  TBili  0.6  /  DBili  x   /  AST  15  /  ALT  27  /  AlkPhos  45  06-29    PT/INR - ( 2022 00:47 )   PT: 12.4 sec;   INR: 1.08 ratio         PTT - ( 2022 00:19 )  PTT:37.1 sec                  Imaging Studies

## 2022-06-29 NOTE — PROGRESS NOTE ADULT - SUBJECTIVE AND OBJECTIVE BOX
CRITICAL CARE ATTENDING - CTICU    MEDICATIONS  (STANDING):  atovaquone  Suspension 1500 milliGRAM(s) Oral daily  cefepime   IVPB 1000 milliGRAM(s) IV Intermittent every 8 hours  chlorhexidine 2% Cloths 1 Application(s) Topical <User Schedule>  furosemide   Injectable 40 milliGRAM(s) IV Push daily  heparin   Injectable 5000 Unit(s) SubCutaneous every 8 hours  hydrALAZINE 25 milliGRAM(s) Oral every 8 hours  insulin regular Infusion 3 Unit(s)/Hr (3 mL/Hr) IV Continuous <Continuous>  methylPREDNISolone sodium succinate Injectable 24 milliGRAM(s) IV Push every 12 hours  milrinone Infusion 0.1 MICROgram(s)/kG/Min (2.19 mL/Hr) IV Continuous <Continuous>  multivitamin 1 Tablet(s) Oral daily  mycophenolate mofetil 1000 milliGRAM(s) Oral every 12 hours  NIFEdipine XL 30 milliGRAM(s) Oral daily  nystatin    Suspension 342131 Unit(s) Oral <User Schedule>  pantoprazole    Tablet 40 milliGRAM(s) Oral before breakfast  polyethylene glycol 3350 17 Gram(s) Oral daily  sodium chloride 0.9%. 1000 milliLiter(s) (10 mL/Hr) IV Continuous <Continuous>  tacrolimus 5 milliGRAM(s) Oral <User Schedule>  vancomycin    Solution 125 milliGRAM(s) Oral every 12 hours  vancomycin  IVPB 500 milliGRAM(s) IV Intermittent <User Schedule>                                    10.5   15.47 )-----------( 282      ( 2022 00:47 )             31.6       06-    133<L>  |  102  |  47<H>  ----------------------------<  120<H>  5.0   |  22  |  1.70<H>    Ca    8.9      2022 00:47  Phos  2.3     06-  Mg     1.9     -    TPro  5.4<L>  /  Alb  3.4  /  TBili  0.6  /  DBili  x   /  AST  15  /  ALT  27  /  AlkPhos  45  06-29      PT/INR - ( 2022 00:47 )   PT: 12.4 sec;   INR: 1.08 ratio         PTT - ( 2022 00:19 )  PTT:37.1 sec        Daily     Daily Weight in k.4 (2022 00:00)       @ 07:01  -   @ 07:00  --------------------------------------------------------  IN: 1621.6 mL / OUT: 2605 mL / NET: -983.4 mL        Critically Ill patient  : [ ] preoperative ,   [x] post operative    Requires :  [] Arterial Line   [] Central Line  [ ] PA catheter  [ ] IABP  [ ] ECMO  [ ] LVAD  [ ] Ventilator  [x] pacemaker- TPM [ ] Impella                       [x ] ABG's     [ x] Pulse Oxymetry Monitoring  Bedside evaluation , monitoring , treatment of hemodynamics , fluids , IVP/ IVCD meds.        Diagnosis:     POD  - Heart Transplant  & AICD removal     Cardiogenic Shock - post op - resolving      CHF- acute [ x]   chronic [ ]    systolic [ x]   diatolic [x ]          - Echo- EF -  post op           [ ] RV dysfunction          - Cxr-cardiomegally, edema          - Clinical-  [x ]inotropes   [ ]pressors   [x ]diuresis   [ ]IABP   [ ]ECMO   [ ]LVAD   [ ] ventilator     Chest tube drainage     Requires chest PT, pulmonary toilet, suctioning to maintain SaO2,  patent airway and treat atelectasis.     IABP   [x ] removed on   and f/u vascular checks      Temporary pacemaker (TPM) interrogation and setting.     Hemodynamic lability,  instability. Requires IVCD [ ] vasopressors [x] inotropes  [x ] vasodilator  [x] IVSS fluid  to maintain MAP, perfusion, C.I.     Hypotension a/w Hypertension, requiring intravenous medication.     Immunosuppressive therapy    DM2- IVCD insulin     Hypovolemia     Hyponatremia     Renal Failure - Acute Kidney Injury / CKD     Fluid overload     Requires bedside physical therapy, mobilization and total FDC care.               I, Julius Kinney, personally performed the services described in this documentation. All medical record entries made by the scribe were at my direction and in my presence. I have reviewed the chart and agree that the record reflects my personal performance and is accurate and complete.   Julius Kinney MD.       By signing my name below, I, Camila Martinez, attest that this documentation has been prepared under the direction and in the presence of Julius Kinney MD.   Electronically Signed: Mini Lassiter 22 @ 07:42        Discussed with CT surgeon, Physician Assistant - Nurse Practitioner- Critical care medicine team.   Dicussed at  AM / PM rounds.   Chart, labs , films reviewed.    Total Time:  CRITICAL CARE ATTENDING - CTICU    MEDICATIONS  (STANDING):  atovaquone  Suspension 1500 milliGRAM(s) Oral daily  cefepime   IVPB 1000 milliGRAM(s) IV Intermittent every 8 hours  chlorhexidine 2% Cloths 1 Application(s) Topical <User Schedule>  furosemide   Injectable 40 milliGRAM(s) IV Push daily  heparin   Injectable 5000 Unit(s) SubCutaneous every 8 hours  hydrALAZINE 25 milliGRAM(s) Oral every 8 hours  insulin regular Infusion 3 Unit(s)/Hr (3 mL/Hr) IV Continuous <Continuous>  methylPREDNISolone sodium succinate Injectable 24 milliGRAM(s) IV Push every 12 hours  milrinone Infusion 0.1 MICROgram(s)/kG/Min (2.19 mL/Hr) IV Continuous <Continuous>  multivitamin 1 Tablet(s) Oral daily  mycophenolate mofetil 1000 milliGRAM(s) Oral every 12 hours  NIFEdipine XL 30 milliGRAM(s) Oral daily  nystatin    Suspension 314150 Unit(s) Oral <User Schedule>  pantoprazole    Tablet 40 milliGRAM(s) Oral before breakfast  polyethylene glycol 3350 17 Gram(s) Oral daily  sodium chloride 0.9%. 1000 milliLiter(s) (10 mL/Hr) IV Continuous <Continuous>  tacrolimus 5 milliGRAM(s) Oral <User Schedule>  vancomycin    Solution 125 milliGRAM(s) Oral every 12 hours  vancomycin  IVPB 500 milliGRAM(s) IV Intermittent <User Schedule>                                    10.5   15.47 )-----------( 282      ( 2022 00:47 )             31.6       06-    133<L>  |  102  |  47<H>  ----------------------------<  120<H>  5.0   |  22  |  1.70<H>    Ca    8.9      2022 00:47  Phos  2.3     06-  Mg     1.9     -    TPro  5.4<L>  /  Alb  3.4  /  TBili  0.6  /  DBili  x   /  AST  15  /  ALT  27  /  AlkPhos  45  06-29      PT/INR - ( 2022 00:47 )   PT: 12.4 sec;   INR: 1.08 ratio         PTT - ( 2022 00:19 )  PTT:37.1 sec        Daily     Daily Weight in k.4 (2022 00:00)       @ 07:01  -   @ 07:00  --------------------------------------------------------  IN: 1621.6 mL / OUT: 2605 mL / NET: -983.4 mL        Critically Ill patient  : [ ] preoperative ,   [x] post operative    Requires :  [] Arterial Line   [] Central Line  [ ] PA catheter  [ ] IABP  [ ] ECMO  [ ] LVAD  [ ] Ventilator  [x] pacemaker- TPM [ ] Impella                       [x ] ABG's     [ x] Pulse Oxymetry Monitoring  Bedside evaluation , monitoring , treatment of hemodynamics , fluids , IVP/ IVCD meds.        Diagnosis:     POD  - Heart Transplant  & AICD removal     Cardiogenic Shock - post op - resolving      CHF- acute [ x]   chronic [ ]    systolic [ x]   diatolic [x ]          - Echo- EF -  post op           [ ] RV dysfunction          - Cxr-cardiomegally, edema          - Clinical-  [x ]inotropes   [ ]pressors   [x ]diuresis   [ ]IABP   [ ]ECMO   [ ]LVAD   [ ] ventilator     Chest tube drainage     Requires chest PT, pulmonary toilet, suctioning to maintain SaO2,  patent airway and treat atelectasis.     IABP   [x ] removed on   and f/u vascular checks      Temporary pacemaker (TPM) interrogation and setting.     Hemodynamic lability,  instability. Requires IVCD [ ] vasopressors [x] inotropes  [x ] vasodilator  [x] IVSS fluid  to maintain MAP, perfusion, C.I.     Hypotension a/w Hypertension, requiring intravenous medication.     Immunosuppressive therapy    DM2- IVCD insulin     Hypovolemia     Hyponatremia     Renal Failure - Acute Kidney Injury / CKD     Fluid overload     Requires bedside physical therapy, mobilization and total longterm care.               I, Julius Kinney, personally performed the services described in this documentation. All medical record entries made by the scribe were at my direction and in my presence. I have reviewed the chart and agree that the record reflects my personal performance and is accurate and complete.   Julius Kinney MD.       By signing my name below, I, Camila Martinez, attest that this documentation has been prepared under the direction and in the presence of Julius Kinney MD.   Electronically Signed: Mini Lassiter 22 @ 07:42        Discussed with CT surgeon, Physician Assistant - Nurse Practitioner- Critical care medicine team.   Dicussed at  AM / PM rounds.   Chart, labs , films reviewed.    Total Time:  30 min

## 2022-06-29 NOTE — PROGRESS NOTE ADULT - SUBJECTIVE AND OBJECTIVE BOX
[facility] DIVISION OF KIDNEY DISEASES AND HYPERTENSION   FOLLOW UP NOTE    --------------------------------------------------------------------------------  66M male with PMHx HTN, HLD, type 2 DM, chronic HFrEF,  (EF 25-30% by Echo) underwent OHT on 6/21/22. Nephrology consulted for JAGRUTI.     Upon review of labs, Scr was 1.2 on 4/19/22 and was elevated at 1.8 on 5/24/22. SCr downtrended to 1.01 on 6/20/22. Pt underwent OHT on 6/21/22 and required IABP post operatively. Scr uptrended to 1.23 on 6/23/22 with elevated PA pressures. SCr increased to 1.97. Pt currently on diuretics and non oliguric. UA done on 6/8/22 showed trace blood and proteinuria.  SCr stable/elevated at 1.7 today.    Pt. seen and examined in CTU, sitting comfortably in chair, non oliguric with UOP: ~2.6 l.      PAST HISTORY  --------------------------------------------------------------------------------  No significant changes to PMH, PSH, FHx, SHx, unless otherwise noted    ALLERGIES & MEDICATIONS  --------------------------------------------------------------------------------  Allergies    No Known Allergies    Intolerances      Standing Inpatient Medications  atovaquone  Suspension 1500 milliGRAM(s) Oral daily  cefepime   IVPB 1000 milliGRAM(s) IV Intermittent every 8 hours  chlorhexidine 2% Cloths 1 Application(s) Topical <User Schedule>  furosemide    Tablet 40 milliGRAM(s) Oral daily  heparin   Injectable 5000 Unit(s) SubCutaneous every 8 hours  hydrALAZINE 25 milliGRAM(s) Oral every 8 hours  insulin regular Infusion 3 Unit(s)/Hr IV Continuous <Continuous>  methylPREDNISolone sodium succinate Injectable 24 milliGRAM(s) IV Push every 12 hours  multivitamin 1 Tablet(s) Oral daily  mycophenolate mofetil 1000 milliGRAM(s) Oral every 12 hours  nystatin    Suspension 478346 Unit(s) Oral <User Schedule>  pantoprazole    Tablet 40 milliGRAM(s) Oral before breakfast  polyethylene glycol 3350 17 Gram(s) Oral daily  tacrolimus 3 milliGRAM(s) Oral <User Schedule>  valGANciclovir 450 milliGRAM(s) Oral <User Schedule>  vancomycin    Solution 125 milliGRAM(s) Oral every 12 hours  vancomycin  IVPB 500 milliGRAM(s) IV Intermittent <User Schedule>    PRN Inpatient Medications  traMADol 25 milliGRAM(s) Oral every 8 hours PRN      REVIEW OF SYSTEMS  --------------------------------------------------------------------------------  Gen: +fatigue  No fevers/chills  Skin: No rashes  Head/Eyes/Ears: Normal hearing,   Respiratory: No dyspnea, cough  CV: No chest pain  GI: No abdominal pain, diarrhea  : No dysuria, hematuria  MSK: +shoulder pain-  +edema    All other systems were reviewed and are negative, except as noted.    VITALS/PHYSICAL EXAM  --------------------------------------------------------------------------------  T(C): 36.2 (06-29-22 @ 11:00), Max: 36.8 (06-29-22 @ 00:00)  HR: 94 (06-29-22 @ 11:00) (87 - 100)  BP: 129/75 (06-29-22 @ 11:00) (118/69 - 166/89)  RR: 20 (06-29-22 @ 11:00) (12 - 31)  SpO2: 95% (06-29-22 @ 11:00) (94% - 100%)  Wt(kg): --      06-28-22 @ 07:01  -  06-29-22 @ 07:00  --------------------------------------------------------  IN: 1621.6 mL / OUT: 2605 mL / NET: -983.4 mL    06-29-22 @ 07:01  -  06-29-22 @ 14:59  --------------------------------------------------------  IN: 966.4 mL / OUT: 1455 mL / NET: -488.6 mL      Physical Exam:  	Gen: NAD  	HEENT: Anicteric  	Pulm: CTA B/L  	CV: S1S2+, midline sx scar, dressing +  	Abd: Soft, +BS    	Ext: LE edema B/L+  	Neuro: Awake              : Hernandez+ with clear urine in the bag  	Skin: Warm and dry      LABS/STUDIES  --------------------------------------------------------------------------------              10.5   15.47 >-----------<  282      [06-29-22 @ 00:47]              31.6     133  |  102  |  47  ----------------------------<  120      [06-29-22 @ 00:47]  5.0   |  22  |  1.70        Ca     8.9     [06-29-22 @ 00:47]      Mg     1.9     [06-29-22 @ 00:47]      Phos  2.3     [06-29-22 @ 00:47]    Creatinine Trend:  SCr 1.70 [06-29 @ 00:47]  SCr 1.65 [06-28 @ 00:18]  SCr 2.32 [06-26 @ 23:56]  SCr 1.77 [06-26 @ 01:06]  SCr 1.84 [06-25 @ 00:39]    Urinalysis - [06-08-22 @ 00:26]      Color Light Yellow / Appearance Clear / SG 1.014 / pH 6.0      Gluc Negative / Ketone Negative  / Bili Negative / Urobili Negative       Blood Trace / Protein Trace / Leuk Est Negative / Nitrite Negative      RBC 1 / WBC 0 / Hyaline 0 / Gran  / Sq Epi  / Non Sq Epi 0 / Bacteria Negative    Syphilis Screen (Treponema Pallidum Ab) Negative      [06-02-22 @ 21:12]  Immunofixation Urine:   Reference Range: None Detected      [06-05-22 @ 12:15]  UPEP Interpretation: Normal Electrophoresis Pattern      [06-05-22 @ 12:15]    TacrolimusTacrolimus (), Serum: 16.7 ng/mL (06-29 @ 07:38)  Tacrolimus (), Serum: 7.6 ng/mL (06-28 @ 06:57)  Tacrolimus (), Serum: 6.0 ng/mL (06-27 @ 07:09)  Tacrolimus (), Serum: 5.9 ng/mL (06-26 @ 07:06)

## 2022-06-29 NOTE — PROGRESS NOTE ADULT - ASSESSMENT
66M Mixed Ischemic/NICM Cardiomyopathy (EF 25-30% at baseline, s/p BIV-ICD), CAD (medically managed MIs in ,, Most recent stent in 2022 to mLAD) presented with multiple near syncope, found to have 41 episodes of VT and admitted initially to cardiac telemetry for further evaluation/management. Ischemic evaluation without new disease and VT ablation without substrate to complete ablation. Transferred from [facility] to [facility] for further management and evaluation for LVAD vs OHT. CT surgery consulted for LVAD/transplant evaluation.   Currently undergoing LVAD/transplant eval  Care per Heart failure and CCU primary team  Preop work up and diagnostics in progress  22 s/pOrthotopic heart transplant, ICD removal, iabp  Post op inotropes /pressors, nitric oxide  /Primacor/insulin  extubated d 1, hi flow   iabp d/c    nitric d/c   s/p biopsy, pw d/c,echo neg effusion  Htn-hydralazine, procardia added increased   JAGRUTI-stable, renal transplant following  hyponatremia, lasix/albumin  L sc thrombus,R cephalic thrombus on heparin sq---> plan to check LE duplex and if + may need full anticoagulation  Insulin infusion remains on  On vanco and cefepime for icd pocket + staph epi and morganella   primacor d/c   Transferred to sdu  As per renal lasix increased 40po bid  Chest tube # 3 d/c

## 2022-06-29 NOTE — PROGRESS NOTE ADULT - SUBJECTIVE AND OBJECTIVE BOX
Follow Up:  Cardiomyopathy s/p heart transplant    Interval History/ROS:Patient is a 66y old  Male who presents with a chief complaint of LVAD/OHT eval (29 Jun 2022 07:45)      REVIEW OF SYSTEMS  [  ] ROS unobtainable because:    [ x ] All other systems negative except as noted below    Constitutional:  [ ] fever [ ] chills  [ ] weight loss  [ ]night sweat  [ ]poor appetite/PO intake [ ]fatigue   Skin:  [ ] rash [ ] phlebitis	  Eyes: [ ] icterus [ ] pain  [ ] discharge	  ENMT: [ ] sore throat  [ ] thrush [ ] ulcers [ ] exudates [ ]anosmia  Respiratory: [ ] dyspnea [ ] hemoptysis [ ] cough [ ] sputum	  Cardiovascular:  [ ] chest pain [ ] palpitations [ ] edema	  Gastrointestinal:  [ ] nausea [ ] vomiting [ ] diarrhea [ ] constipation [ ] pain	  Genitourinary:  [ ] dysuria [ ] frequency [ ] hematuria [ ] discharge [ ] flank pain  [ ] incontinence  Musculoskeletal:  [ ] myalgias [ ] arthralgias [ ] arthritis  [ ] back pain  Neurological:  [ ] headache [ ] weakness [ ] seizures  [ ] confusion/altered mental status    Allergies  No Known Allergies        ANTIMICROBIALS:    atovaquone  Suspension 1500 daily  cefepime   IVPB 1000 every 8 hours  nystatin    Suspension 372005 <User Schedule>  valGANciclovir 450 <User Schedule>  vancomycin    Solution 125 every 12 hours  vancomycin  IVPB 500 <User Schedule>        OTHER MEDS: MEDICATIONS  (STANDING):  furosemide    Tablet 40 daily  heparin   Injectable 5000 every 8 hours  hydrALAZINE 25 every 8 hours  insulin regular Infusion 3 <Continuous>  methylPREDNISolone sodium succinate Injectable 24 every 12 hours  mycophenolate mofetil 1000 every 12 hours  pantoprazole    Tablet 40 before breakfast  polyethylene glycol 3350 17 daily  tacrolimus 3 <User Schedule>  traMADol 25 every 8 hours PRN      Vital Signs Last 24 Hrs  T(F): 97.2 (06-29-22 @ 11:00), Max: 98.2 (06-29-22 @ 00:00)    Vital Signs Last 24 Hrs  HR: 94 (06-29-22 @ 11:00) (87 - 100)  BP: 129/75 (06-29-22 @ 11:00) (114/62 - 166/89)  RR: 20 (06-29-22 @ 11:00)  SpO2: 95% (06-29-22 @ 11:00) (94% - 100%)  Wt(kg): --    EXAM:    Constitutional:no acute distress  Eyes:JOHNY, EOMI  Ear/Nose/Throat: no oral lesions, 	  Respiratory: clear BL  left upper chest old ICD site  chest tubes   Cardiovascular: S1S2 sternotomy site CDI  Gastrointestinal:soft, (+) BS, no tenderness  Extremities:no e/e/c  No Lymphadenopathy  IV sites not inflammed.    Labs:                        10.5   15.47 )-----------( 282      ( 29 Jun 2022 00:47 )             31.6     06-29    133<L>  |  102  |  47<H>  ----------------------------<  120<H>  5.0   |  22  |  1.70<H>    Ca    8.9      29 Jun 2022 00:47  Phos  2.3     06-29  Mg     1.9     06-29    TPro  5.4<L>  /  Alb  3.4  /  TBili  0.6  /  DBili  x   /  AST  15  /  ALT  27  /  AlkPhos  45  06-29      WBC Trend:  WBC Count: 15.47 (06-29-22 @ 00:47)  WBC Count: 12.94 (06-28-22 @ 00:19)  WBC Count: 14.42 (06-26-22 @ 23:56)  WBC Count: 10.18 (06-26-22 @ 01:06)      Creatine Trend:  Creatinine, Serum: 1.70 (06-29)  Creatinine, Serum: 1.65 (06-28)  Creatinine, Serum: 2.32 (06-26)  Creatinine, Serum: 1.77 (06-26)      Liver Biochemical Testing Trend:  Alanine Aminotransferase (ALT/SGPT): 27 (06-29)  Alanine Aminotransferase (ALT/SGPT): 24 (06-28)  Alanine Aminotransferase (ALT/SGPT): 27 (06-26)  Alanine Aminotransferase (ALT/SGPT): 29 (06-26)  Alanine Aminotransferase (ALT/SGPT): 30 (06-25)  Aspartate Aminotransferase (AST/SGOT): 15 (06-29-22 @ 00:47)  Aspartate Aminotransferase (AST/SGOT): 16 (06-28-22 @ 00:18)  Aspartate Aminotransferase (AST/SGOT): 16 (06-26-22 @ 23:56)  Aspartate Aminotransferase (AST/SGOT): 19 (06-26-22 @ 01:06)  Aspartate Aminotransferase (AST/SGOT): 23 (06-25-22 @ 00:39)  Bilirubin Total, Serum: 0.6 (06-29)  Bilirubin Total, Serum: 0.6 (06-28)  Bilirubin Total, Serum: 0.7 (06-26)  Bilirubin Total, Serum: 0.8 (06-26)  Bilirubin Total, Serum: 0.7 (06-25)      Trend LDH  05-27-22 @ 15:23  239          MICROBIOLOGY:  Vancomycin Level, Trough: 6.2 (06-29 @ 09:19)  Vancomycin Level, Trough: 6.7 (06-28 @ 09:40)  Vancomycin Level, Trough: 20.0 (06-26 @ 23:56)  Vancomycin Level, Trough: 16.8 (06-26 @ 12:31)  Vancomycin Level, Trough: 13.8 (06-25 @ 23:13)    MRSA PCR Result.: NotDetec (06-21-22 @ 08:54)  MRSA PCR Result.: NotDetec (05-27-22 @ 15:11)      Culture - Acid Fast - Body Fluid w/Smear (collected 21 Jun 2022 23:09)  Source: .Body Fluid Other  Preliminary Report:    Culture is being performed.    Culture - Fungal, Body Fluid (collected 21 Jun 2022 23:09)  Source: .Body Fluid Other  Preliminary Report:    Testing in progress    Culture - Body Fluid with Gram Stain (collected 21 Jun 2022 23:09)  Source: .Body Fluid PROCUREMENT FLUID  Final Report:    No growth at 5 days    Culture - Fungal, Other (collected 21 Jun 2022 23:09)  Source: .Other Other  Preliminary Report:    Testing in progress    Culture - Surgical Swab (collected 21 Jun 2022 23:09)  Source: .Surgical Swab AICD POCKET  Final Report:    Growth in fluid media only Staphylococcus epidermidis    Growth in fluid media only Morganella morganii  Organism: Staphylococcus epidermidis  Morganella morganii  Organism: Morganella morganii    Sensitivities:      -  Amikacin: S <=16      -  Amoxicillin/Clavulanic Acid: R >16/8      -  Ampicillin: R >16 These ampicillin results predict results for amoxicillin      -  Ampicillin/Sulbactam: I 16/8 Enterobacter, Klebsiella aerogenes, Citrobacter, and Serratia may develop resistance during prolonged therapy (3-4 days)      -  Aztreonam: S <=4      -  Cefazolin: R >16 Enterobacter, Klebsiella aerogenes, Citrobacter, and Serratia may develop resistance during prolonged therapy (3-4 days)      -  Cefepime: S <=2      -  Cefoxitin: S <=8      -  Ceftriaxone: S <=1 Enterobacter, Klebsiella aerogenes, Citrobacter, and Serratia may develop resistance during prolonged therapy      -  Ciprofloxacin: S <=0.25      -  Ertapenem: S <=0.5      -  Gentamicin: S <=2      -  Imipenem: S <=1      -  Levofloxacin: S <=0.5      -  Meropenem: S <=1      -  Piperacillin/Tazobactam: S <=8      -  Tobramycin: S <=2      -  Trimethoprim/Sulfamethoxazole: S <=0.5/9.5      Method Type: ANABELL  Organism: Staphylococcus epidermidis    Sensitivities:      -  Ampicillin/Sulbactam: R <=8/4      -  Cefazolin: R <=4      -  Clindamycin: R 2 This isolate is presumed to be clindamycin resistant based on detection of inducible resistance. Clindamycin may still be effective in some patients.      -  Erythromycin: R >4      -  Gentamicin: R >8 Should not be used as monotherapy      -  Oxacillin: R >2      -  Penicillin: R 8      -  Rifampin: S <=1 Should not be used as monotherapy      -  Tetra/Doxy: S <=1      -  Trimethoprim/Sulfamethoxazole: R >2/38      -  Vancomycin: S 2      Method Type: ANABELL    Culture - Tissue with Gram Stain (collected 21 Jun 2022 23:09)  Source: .Tissue Other  Final Report:    No growth at 5 days    Culture - Fungal, Tissue (collected 21 Jun 2022 23:09)  Source: .Tissue Other  Preliminary Report:    Testing in progress    Culture - Acid Fast - Tissue w/Smear (collected 21 Jun 2022 23:09)  Source: .Tissue Other  Preliminary Report:    Culture is being performed.    Culture - Blood (collected 07 Jun 2022 22:47)  Source: .Blood Blood  Final Report:    No Growth Final    Culture - Blood (collected 07 Jun 2022 22:47)  Source: .Blood Blood  Final Report:    No Growth Final          HIV-1 RNA Quantitative, Viral Load: NOT DET. copies/mL (05-27-22 @ 15:30)  HIV-1 Viral Load Result: NOT DET. (05-27-22 @ 15:30)    Cytomegalovirus By PCR:   NotDetec (06-26-22 @ 23:56)                COVID-19 PCR: NotDetec (06-27-22 @ 16:27)  COVID-19 PCR: NotDetec (06-21-22 @ 08:54)  COVID-19 PCR: NotDetec (06-05-22 @ 14:12)  COVID-19 PCR: NotDetec (06-02-22 @ 19:03)  COVID-19 PCR: Negative (05-26-22 @ 14:12)  COVID-19 PCR: Negative (05-20-22 @ 14:17)    COVID-19 [facility] AB Interp: Positive (05-27-22 @ 17:23)  COVID-19 Nucleocapsid HERBERTH AB Interp: Positive (05-27-22 @ 17:23)                  Blood Gas Arterial, Lactate: 1.8 (06-28 @ 00:11)  Blood Gas Arterial, Lactate: 1.6 (06-26 @ 23:40)    Sedimentation Rate, Erythrocyte: 26 mm/hr (05-27-22 @ 15:17)      RADIOLOGY:  imaging below personally reviewed      < from: [facility] Duplex Upper Ext Vein Scan, Bilat (06.28.22 @ 15:34) >  There is acute, partially occlusive thrombus affecting the left subclavian vein. The right basilic and cephalic veins, superficial veins, are again  demonstrated to be thrombosed.   < end of copied text >        < from: Xray Chest 1 View- PORTABLE-Routine (Xray Chest 1 View- PORTABLE-Routine in AM.) (06.27.22 @ 02:42) >  Bibasilar atelectasis. No significant interval change.    < end of copied text >           Follow Up:  Cardiomyopathy s/p heart transplant    Interval History/ROS:Patient is a 66y old  Male who presents with a chief complaint of LVAD/OHT eval (29 Jun 2022 07:45)      REVIEW OF SYSTEMS  [  ] ROS unobtainable because:    [ x ] All other systems negative except as noted below    Constitutional:  [ ] fever [ ] chills  [ ] weight loss  [ ]night sweat  [ ]poor appetite/PO intake [ ]fatigue   Skin:  [ ] rash [ ] phlebitis	  Eyes: [ ] icterus [ ] pain  [ ] discharge	  ENMT: [ ] sore throat  [ ] thrush [ ] ulcers [ ] exudates [ ]anosmia  Respiratory: [ ] dyspnea [ ] hemoptysis [ ] cough [ ] sputum	  Cardiovascular:  [ ] chest pain [ ] palpitations [ ] edema	  Gastrointestinal:  [ ] nausea [ ] vomiting [ ] diarrhea [ ] constipation [ ] pain	  Genitourinary:  [ ] dysuria [ ] frequency [ ] hematuria [ ] discharge [ ] flank pain  [ ] incontinence  Musculoskeletal:  [ ] myalgias [ ] arthralgias [ ] arthritis  [ ] back pain  Neurological:  [ ] headache [ ] weakness [ ] seizures  [ ] confusion/altered mental status    Allergies  No Known Allergies        ANTIMICROBIALS:    atovaquone  Suspension 1500 daily  cefepime   IVPB 1000 every 8 hours  nystatin    Suspension 236046 <User Schedule>  valGANciclovir 450 <User Schedule>  vancomycin    Solution 125 every 12 hours  vancomycin  IVPB 500 <User Schedule>        OTHER MEDS: MEDICATIONS  (STANDING):  furosemide    Tablet 40 daily  heparin   Injectable 5000 every 8 hours  hydrALAZINE 25 every 8 hours  insulin regular Infusion 3 <Continuous>  methylPREDNISolone sodium succinate Injectable 24 every 12 hours  mycophenolate mofetil 1000 every 12 hours  pantoprazole    Tablet 40 before breakfast  polyethylene glycol 3350 17 daily  tacrolimus 3 <User Schedule>  traMADol 25 every 8 hours PRN      Vital Signs Last 24 Hrs  T(F): 97.2 (06-29-22 @ 11:00), Max: 98.2 (06-29-22 @ 00:00)    Vital Signs Last 24 Hrs  HR: 94 (06-29-22 @ 11:00) (87 - 100)  BP: 129/75 (06-29-22 @ 11:00) (114/62 - 166/89)  RR: 20 (06-29-22 @ 11:00)  SpO2: 95% (06-29-22 @ 11:00) (94% - 100%)  Wt(kg): --    EXAM:    Constitutional:no acute distress  Eyes:JOHNY, EOMI  Ear/Nose/Throat: no oral lesions, 	  Respiratory: clear BL  left upper chest old ICD site  chest tubes x2  Cardiovascular: S1S2 sternotomy site CDI  Gastrointestinal:soft, (+) BS, no tenderness  Extremities:no e/e/c  No Lymphadenopathy  IV sites not inflammed.    Labs:                        10.5   15.47 )-----------( 282      ( 29 Jun 2022 00:47 )             31.6     06-29    133<L>  |  102  |  47<H>  ----------------------------<  120<H>  5.0   |  22  |  1.70<H>    Ca    8.9      29 Jun 2022 00:47  Phos  2.3     06-29  Mg     1.9     06-29    TPro  5.4<L>  /  Alb  3.4  /  TBili  0.6  /  DBili  x   /  AST  15  /  ALT  27  /  AlkPhos  45  06-29      WBC Trend:  WBC Count: 15.47 (06-29-22 @ 00:47)  WBC Count: 12.94 (06-28-22 @ 00:19)  WBC Count: 14.42 (06-26-22 @ 23:56)  WBC Count: 10.18 (06-26-22 @ 01:06)      Creatine Trend:  Creatinine, Serum: 1.70 (06-29)  Creatinine, Serum: 1.65 (06-28)  Creatinine, Serum: 2.32 (06-26)  Creatinine, Serum: 1.77 (06-26)      Liver Biochemical Testing Trend:  Alanine Aminotransferase (ALT/SGPT): 27 (06-29)  Alanine Aminotransferase (ALT/SGPT): 24 (06-28)  Alanine Aminotransferase (ALT/SGPT): 27 (06-26)  Alanine Aminotransferase (ALT/SGPT): 29 (06-26)  Alanine Aminotransferase (ALT/SGPT): 30 (06-25)  Aspartate Aminotransferase (AST/SGOT): 15 (06-29-22 @ 00:47)  Aspartate Aminotransferase (AST/SGOT): 16 (06-28-22 @ 00:18)  Aspartate Aminotransferase (AST/SGOT): 16 (06-26-22 @ 23:56)  Aspartate Aminotransferase (AST/SGOT): 19 (06-26-22 @ 01:06)  Aspartate Aminotransferase (AST/SGOT): 23 (06-25-22 @ 00:39)  Bilirubin Total, Serum: 0.6 (06-29)  Bilirubin Total, Serum: 0.6 (06-28)  Bilirubin Total, Serum: 0.7 (06-26)  Bilirubin Total, Serum: 0.8 (06-26)  Bilirubin Total, Serum: 0.7 (06-25)      Trend LDH  05-27-22 @ 15:23  239          MICROBIOLOGY:  Vancomycin Level, Trough: 6.2 (06-29 @ 09:19)  Vancomycin Level, Trough: 6.7 (06-28 @ 09:40)  Vancomycin Level, Trough: 20.0 (06-26 @ 23:56)  Vancomycin Level, Trough: 16.8 (06-26 @ 12:31)  Vancomycin Level, Trough: 13.8 (06-25 @ 23:13)    MRSA PCR Result.: NotDetec (06-21-22 @ 08:54)  MRSA PCR Result.: NotDetec (05-27-22 @ 15:11)      Culture - Acid Fast - Body Fluid w/Smear (collected 21 Jun 2022 23:09)  Source: .Body Fluid Other  Preliminary Report:    Culture is being performed.    Culture - Fungal, Body Fluid (collected 21 Jun 2022 23:09)  Source: .Body Fluid Other  Preliminary Report:    Testing in progress    Culture - Body Fluid with Gram Stain (collected 21 Jun 2022 23:09)  Source: .Body Fluid PROCUREMENT FLUID  Final Report:    No growth at 5 days    Culture - Fungal, Other (collected 21 Jun 2022 23:09)  Source: .Other Other  Preliminary Report:    Testing in progress    Culture - Surgical Swab (collected 21 Jun 2022 23:09)  Source: .Surgical Swab AICD POCKET  Final Report:    Growth in fluid media only Staphylococcus epidermidis    Growth in fluid media only Morganella morganii  Organism: Staphylococcus epidermidis  Morganella morganii  Organism: Morganella morganii    Sensitivities:      -  Amikacin: S <=16      -  Amoxicillin/Clavulanic Acid: R >16/8      -  Ampicillin: R >16 These ampicillin results predict results for amoxicillin      -  Ampicillin/Sulbactam: I 16/8 Enterobacter, Klebsiella aerogenes, Citrobacter, and Serratia may develop resistance during prolonged therapy (3-4 days)      -  Aztreonam: S <=4      -  Cefazolin: R >16 Enterobacter, Klebsiella aerogenes, Citrobacter, and Serratia may develop resistance during prolonged therapy (3-4 days)      -  Cefepime: S <=2      -  Cefoxitin: S <=8      -  Ceftriaxone: S <=1 Enterobacter, Klebsiella aerogenes, Citrobacter, and Serratia may develop resistance during prolonged therapy      -  Ciprofloxacin: S <=0.25      -  Ertapenem: S <=0.5      -  Gentamicin: S <=2      -  Imipenem: S <=1      -  Levofloxacin: S <=0.5      -  Meropenem: S <=1      -  Piperacillin/Tazobactam: S <=8      -  Tobramycin: S <=2      -  Trimethoprim/Sulfamethoxazole: S <=0.5/9.5      Method Type: ANABELL  Organism: Staphylococcus epidermidis    Sensitivities:      -  Ampicillin/Sulbactam: R <=8/4      -  Cefazolin: R <=4      -  Clindamycin: R 2 This isolate is presumed to be clindamycin resistant based on detection of inducible resistance. Clindamycin may still be effective in some patients.      -  Erythromycin: R >4      -  Gentamicin: R >8 Should not be used as monotherapy      -  Oxacillin: R >2      -  Penicillin: R 8      -  Rifampin: S <=1 Should not be used as monotherapy      -  Tetra/Doxy: S <=1      -  Trimethoprim/Sulfamethoxazole: R >2/38      -  Vancomycin: S 2      Method Type: ANABELL    Culture - Tissue with Gram Stain (collected 21 Jun 2022 23:09)  Source: .Tissue Other  Final Report:    No growth at 5 days    Culture - Fungal, Tissue (collected 21 Jun 2022 23:09)  Source: .Tissue Other  Preliminary Report:    Testing in progress    Culture - Acid Fast - Tissue w/Smear (collected 21 Jun 2022 23:09)  Source: .Tissue Other  Preliminary Report:    Culture is being performed.    Culture - Blood (collected 07 Jun 2022 22:47)  Source: .Blood Blood  Final Report:    No Growth Final    Culture - Blood (collected 07 Jun 2022 22:47)  Source: .Blood Blood  Final Report:    No Growth Final          HIV-1 RNA Quantitative, Viral Load: NOT DET. copies/mL (05-27-22 @ 15:30)  HIV-1 Viral Load Result: NOT DET. (05-27-22 @ 15:30)    Cytomegalovirus By PCR:   NotDetec (06-26-22 @ 23:56)                COVID-19 PCR: NotDetec (06-27-22 @ 16:27)  COVID-19 PCR: NotDetec (06-21-22 @ 08:54)  COVID-19 PCR: NotDetec (06-05-22 @ 14:12)  COVID-19 PCR: NotDetec (06-02-22 @ 19:03)  COVID-19 PCR: Negative (05-26-22 @ 14:12)  COVID-19 PCR: Negative (05-20-22 @ 14:17)    COVID-19 [facility] AB Interp: Positive (05-27-22 @ 17:23)  COVID-19 Nucleocapsid HERBERTH AB Interp: Positive (05-27-22 @ 17:23)                  Blood Gas Arterial, Lactate: 1.8 (06-28 @ 00:11)  Blood Gas Arterial, Lactate: 1.6 (06-26 @ 23:40)    Sedimentation Rate, Erythrocyte: 26 mm/hr (05-27-22 @ 15:17)      RADIOLOGY:  imaging below personally reviewed      < from: [facility] Duplex Upper Ext Vein Scan, Bilat (06.28.22 @ 15:34) >  There is acute, partially occlusive thrombus affecting the left subclavian vein. The right basilic and cephalic veins, superficial veins, are again  demonstrated to be thrombosed.   < end of copied text >        < from: Xray Chest 1 View- PORTABLE-Routine (Xray Chest 1 View- PORTABLE-Routine in AM.) (06.27.22 @ 02:42) >  Bibasilar atelectasis. No significant interval change.    < end of copied text >

## 2022-06-29 NOTE — CONSULT NOTE ADULT - ATTENDING COMMENTS
65 yo M pmh Ischemic/NICM with EF 25%, CAD s/p BERNABE presenting for VTach run leading to syncope.  Ultimately underwent extended cardiac workup with plan for listing for Heart xplant.  Patient now on milrinone, being considered for IABP.  Had Ct colon for CRC screening.  Was inadequate, needs colonoscopy.  Ate full meals today.     Please change now to clear liquid diet  Plan for 8 am colonoscopy on Friday
Low risk of significant fibrosis  F/u US elastography to confirm
Patient seen and physical examination performed as noted above. HGB is in normal range as is the WBC and platelet count so I do not think that the patient meets criteria for diagnosis of polycythemia vera. HCT is elevated above normal by small amount and this determination is related to plasma volume either normal or contracted. We will request serum erythropoietin level and MELINA 2 assay to see if there is a bone marrow causation for the elevated hematocrit
seen and agree  no intervention required for slow NSVT
Patient seen and examined with Dr Harris    I agree with his history exam and plans as noted above    Patient undergoing evaluation for heart transplant listing/LVAD    Serologies noted as above    Should receive heplisav vaccination  Should receive prevnar 20  may also need Tdap booster    No contra indications to transplant listing from ID perspective    Reggie Escalante MD  Can be called via Teams  After 5pm/weekends [phone]
Hyponatremia: Fluctuating sodium levels noted  Hyposmolar hyponatremia most likely in the setting of heart failure   Although his CVP is low as is the urine sodium doubt this to be hypovolemic. His urine osmolality is not suggestive of it  and  his sodium has shown  improvement with auto diuresis  Would try to mix all drips in NS and not D5W ( heparin and milrinone)   Drink to thirst only   Would avoid fluid repletion    JAGRUTI: Resolved    Rest per Dr. Nolan Muñiz MD  O: [phone]  Contact me on teams
Pt. with likely hemodynamically mediated JAGRUTI/ ATN post heart transplantation, and initiation of CNI. ALso noted to have decrease in Hb and platelets. Scr elevated today, pt. non oliguric. CVP noted to be 5/6 at the time of visit. Trend LDH and haptoglobin levels. Continue diuretics. Monitor BMP. Strict I/Os. Avoid nephrotoxins.
recommend continue insulin drip for safety in the setting of heart transplant   would not transition off the drip as rate is 7 units /hr and this is unsafe to transition to subq regimen  in the setting of transplant aim for glycemic control with insulin drip   will transition when steroids are tapered and drip rates are lower and stable     plan as per fellow note

## 2022-06-30 LAB
ALBUMIN SERPL ELPH-MCNC: 4.3 G/DL — SIGNIFICANT CHANGE UP (ref 3.3–5)
ALP SERPL-CCNC: 48 U/L — SIGNIFICANT CHANGE UP (ref 40–120)
ALT FLD-CCNC: 24 U/L — SIGNIFICANT CHANGE UP (ref 10–45)
ANION GAP SERPL CALC-SCNC: 10 MMOL/L — SIGNIFICANT CHANGE UP (ref 5–17)
AST SERPL-CCNC: 14 U/L — SIGNIFICANT CHANGE UP (ref 10–40)
BASOPHILS # BLD AUTO: 0.04 K/UL — SIGNIFICANT CHANGE UP (ref 0–0.2)
BASOPHILS NFR BLD AUTO: 0.2 % — SIGNIFICANT CHANGE UP (ref 0–2)
BILIRUB SERPL-MCNC: 1 MG/DL — SIGNIFICANT CHANGE UP (ref 0.2–1.2)
BUN SERPL-MCNC: 46 MG/DL — HIGH (ref 7–23)
CALCIUM SERPL-MCNC: 9.6 MG/DL — SIGNIFICANT CHANGE UP (ref 8.4–10.5)
CHLORIDE SERPL-SCNC: 99 MMOL/L — SIGNIFICANT CHANGE UP (ref 96–108)
CO2 SERPL-SCNC: 25 MMOL/L — SIGNIFICANT CHANGE UP (ref 22–31)
CREAT SERPL-MCNC: 1.46 MG/DL — HIGH (ref 0.5–1.3)
EGFR: 53 ML/MIN/1.73M2 — LOW
EOSINOPHIL # BLD AUTO: 0 K/UL — SIGNIFICANT CHANGE UP (ref 0–0.5)
EOSINOPHIL NFR BLD AUTO: 0 % — SIGNIFICANT CHANGE UP (ref 0–6)
GLUCOSE BLDC GLUCOMTR-MCNC: 136 MG/DL — HIGH (ref 70–99)
GLUCOSE BLDC GLUCOMTR-MCNC: 202 MG/DL — HIGH (ref 70–99)
GLUCOSE BLDC GLUCOMTR-MCNC: 222 MG/DL — HIGH (ref 70–99)
GLUCOSE BLDC GLUCOMTR-MCNC: 238 MG/DL — HIGH (ref 70–99)
GLUCOSE SERPL-MCNC: 143 MG/DL — HIGH (ref 70–99)
HCT VFR BLD CALC: 33.2 % — LOW (ref 39–50)
HGB BLD-MCNC: 11.1 G/DL — LOW (ref 13–17)
IMM GRANULOCYTES NFR BLD AUTO: 3.6 % — HIGH (ref 0–1.5)
LYMPHOCYTES # BLD AUTO: 13.1 % — SIGNIFICANT CHANGE UP (ref 13–44)
LYMPHOCYTES # BLD AUTO: 2.1 K/UL — SIGNIFICANT CHANGE UP (ref 1–3.3)
MCHC RBC-ENTMCNC: 29.4 PG — SIGNIFICANT CHANGE UP (ref 27–34)
MCHC RBC-ENTMCNC: 33.4 GM/DL — SIGNIFICANT CHANGE UP (ref 32–36)
MCV RBC AUTO: 87.8 FL — SIGNIFICANT CHANGE UP (ref 80–100)
MONOCYTES # BLD AUTO: 1.29 K/UL — HIGH (ref 0–0.9)
MONOCYTES NFR BLD AUTO: 8 % — SIGNIFICANT CHANGE UP (ref 2–14)
NEUTROPHILS # BLD AUTO: 12.03 K/UL — HIGH (ref 1.8–7.4)
NEUTROPHILS NFR BLD AUTO: 75.1 % — SIGNIFICANT CHANGE UP (ref 43–77)
NRBC # BLD: 0 /100 WBCS — SIGNIFICANT CHANGE UP (ref 0–0)
PLATELET # BLD AUTO: 318 K/UL — SIGNIFICANT CHANGE UP (ref 150–400)
POTASSIUM SERPL-MCNC: 4.9 MMOL/L — SIGNIFICANT CHANGE UP (ref 3.5–5.3)
POTASSIUM SERPL-SCNC: 4.9 MMOL/L — SIGNIFICANT CHANGE UP (ref 3.5–5.3)
PROT SERPL-MCNC: 6.4 G/DL — SIGNIFICANT CHANGE UP (ref 6–8.3)
RBC # BLD: 3.78 M/UL — LOW (ref 4.2–5.8)
RBC # FLD: 17.7 % — HIGH (ref 10.3–14.5)
SODIUM SERPL-SCNC: 134 MMOL/L — LOW (ref 135–145)
TACROLIMUS SERPL-MCNC: 10 NG/ML — SIGNIFICANT CHANGE UP
VANCOMYCIN TROUGH SERPL-MCNC: 5.3 UG/ML — LOW (ref 10–20)
WBC # BLD: 16.04 K/UL — HIGH (ref 3.8–10.5)
WBC # FLD AUTO: 16.04 K/UL — HIGH (ref 3.8–10.5)

## 2022-06-30 PROCEDURE — 93970 EXTREMITY STUDY: CPT | Mod: 26

## 2022-06-30 PROCEDURE — 99233 SBSQ HOSP IP/OBS HIGH 50: CPT

## 2022-06-30 PROCEDURE — 99232 SBSQ HOSP IP/OBS MODERATE 35: CPT | Mod: 24

## 2022-06-30 PROCEDURE — 71045 X-RAY EXAM CHEST 1 VIEW: CPT | Mod: 26

## 2022-06-30 PROCEDURE — 99232 SBSQ HOSP IP/OBS MODERATE 35: CPT | Mod: GC

## 2022-06-30 PROCEDURE — 99232 SBSQ HOSP IP/OBS MODERATE 35: CPT

## 2022-06-30 RX ORDER — INSULIN GLARGINE 100 [IU]/ML
12 INJECTION, SOLUTION SUBCUTANEOUS AT BEDTIME
Refills: 0 | Status: DISCONTINUED | OUTPATIENT
Start: 2022-06-30 | End: 2022-07-07

## 2022-06-30 RX ORDER — VANCOMYCIN HCL 1 G
500 VIAL (EA) INTRAVENOUS
Refills: 0 | Status: COMPLETED | OUTPATIENT
Start: 2022-06-30 | End: 2022-07-07

## 2022-06-30 RX ORDER — INSULIN LISPRO 100/ML
3 VIAL (ML) SUBCUTANEOUS ONCE
Refills: 0 | Status: COMPLETED | OUTPATIENT
Start: 2022-06-30 | End: 2022-06-30

## 2022-06-30 RX ADMIN — Medication 3 UNIT(S): at 16:54

## 2022-06-30 RX ADMIN — CEFEPIME 100 MILLIGRAM(S): 1 INJECTION, POWDER, FOR SOLUTION INTRAMUSCULAR; INTRAVENOUS at 06:03

## 2022-06-30 RX ADMIN — MYCOPHENOLATE MOFETIL 1000 MILLIGRAM(S): 250 CAPSULE ORAL at 08:03

## 2022-06-30 RX ADMIN — Medication 60 MILLIGRAM(S): at 06:04

## 2022-06-30 RX ADMIN — TRAMADOL HYDROCHLORIDE 25 MILLIGRAM(S): 50 TABLET ORAL at 11:23

## 2022-06-30 RX ADMIN — Medication 25 MILLIGRAM(S): at 14:57

## 2022-06-30 RX ADMIN — Medication 100 MILLIGRAM(S): at 20:12

## 2022-06-30 RX ADMIN — Medication 25 MILLIGRAM(S): at 06:03

## 2022-06-30 RX ADMIN — TACROLIMUS 4 MILLIGRAM(S): 5 CAPSULE ORAL at 08:02

## 2022-06-30 RX ADMIN — Medication 1 TABLET(S): at 11:21

## 2022-06-30 RX ADMIN — Medication 125 MILLIGRAM(S): at 20:11

## 2022-06-30 RX ADMIN — POLYETHYLENE GLYCOL 3350 17 GRAM(S): 17 POWDER, FOR SOLUTION ORAL at 11:17

## 2022-06-30 RX ADMIN — Medication 500000 UNIT(S): at 20:10

## 2022-06-30 RX ADMIN — Medication 20 MILLIGRAM(S): at 08:03

## 2022-06-30 RX ADMIN — Medication 100 MILLIGRAM(S): at 10:28

## 2022-06-30 RX ADMIN — Medication 20 MILLIGRAM(S): at 20:10

## 2022-06-30 RX ADMIN — ATOVAQUONE 1500 MILLIGRAM(S): 750 SUSPENSION ORAL at 11:22

## 2022-06-30 RX ADMIN — CEFEPIME 100 MILLIGRAM(S): 1 INJECTION, POWDER, FOR SOLUTION INTRAMUSCULAR; INTRAVENOUS at 14:56

## 2022-06-30 RX ADMIN — VALGANCICLOVIR 450 MILLIGRAM(S): 450 TABLET, FILM COATED ORAL at 08:03

## 2022-06-30 RX ADMIN — TRAMADOL HYDROCHLORIDE 25 MILLIGRAM(S): 50 TABLET ORAL at 12:05

## 2022-06-30 RX ADMIN — Medication 125 MILLIGRAM(S): at 08:03

## 2022-06-30 RX ADMIN — INSULIN GLARGINE 12 UNIT(S): 100 INJECTION, SOLUTION SUBCUTANEOUS at 21:17

## 2022-06-30 RX ADMIN — CEFEPIME 100 MILLIGRAM(S): 1 INJECTION, POWDER, FOR SOLUTION INTRAMUSCULAR; INTRAVENOUS at 22:14

## 2022-06-30 RX ADMIN — Medication 10 MILLIGRAM(S): at 22:08

## 2022-06-30 RX ADMIN — Medication 500000 UNIT(S): at 16:35

## 2022-06-30 RX ADMIN — Medication 10 MILLIGRAM(S): at 16:48

## 2022-06-30 RX ADMIN — Medication 2: at 11:42

## 2022-06-30 RX ADMIN — HEPARIN SODIUM 5000 UNIT(S): 5000 INJECTION INTRAVENOUS; SUBCUTANEOUS at 08:04

## 2022-06-30 RX ADMIN — Medication 40 MILLIGRAM(S): at 06:03

## 2022-06-30 RX ADMIN — Medication 40 MILLIGRAM(S): at 14:57

## 2022-06-30 RX ADMIN — PANTOPRAZOLE SODIUM 40 MILLIGRAM(S): 20 TABLET, DELAYED RELEASE ORAL at 06:04

## 2022-06-30 RX ADMIN — Medication 500000 UNIT(S): at 11:17

## 2022-06-30 RX ADMIN — MYCOPHENOLATE MOFETIL 1000 MILLIGRAM(S): 250 CAPSULE ORAL at 20:11

## 2022-06-30 RX ADMIN — HEPARIN SODIUM 5000 UNIT(S): 5000 INJECTION INTRAVENOUS; SUBCUTANEOUS at 16:35

## 2022-06-30 RX ADMIN — CHLORHEXIDINE GLUCONATE 1 APPLICATION(S): 213 SOLUTION TOPICAL at 08:30

## 2022-06-30 RX ADMIN — Medication 25 MILLIGRAM(S): at 22:08

## 2022-06-30 RX ADMIN — TACROLIMUS 4 MILLIGRAM(S): 5 CAPSULE ORAL at 20:11

## 2022-06-30 RX ADMIN — Medication 500000 UNIT(S): at 08:02

## 2022-06-30 RX ADMIN — Medication 2: at 16:48

## 2022-06-30 NOTE — PROGRESS NOTE ADULT - PROBLEM SELECTOR PLAN 2
- will continue to adjust tacro as needed for goal 12-14  - continue with solumedrol taper per protocol, plan to transition to prednisone tomorrow   - remains on cellcept 1000 mg PO BID

## 2022-06-30 NOTE — PROGRESS NOTE ADULT - PROBLEM SELECTOR PLAN 3
- CMV +/+, on Valcyte 450 mg PO BID   - Toxo -/-, no ppx needed, remains on Mepron for PJP ppx  - periop antibiotics per CTS

## 2022-06-30 NOTE — PROGRESS NOTE ADULT - SUBJECTIVE AND OBJECTIVE BOX
Heart Transplant Education    Learner: patient and wife Teresa (on the phone)    Barriers to learning: None    Topics reviewed: Pt has been receiving ongoing transplant education since hospitalization. Pt is s/p OHT 6/21/22, he currently denies any pain or complaints at this time. With his wife on the phone, we discussed the following topics: immunosuppression medications, signs and symptoms of infection and rejection, lifestyle changes post - heart transplant, and checking vital signs and monitoring blood sugars. We discussed the clinic/biopsy appointments and follow up procedures. Reviewed nutrition and importance of following cardiac/diabetic diet.  Pt will meet with the clinical pharmacist to fill pillbox at time of discharge. Teach back confirmed, both the patient and family member have good understanding of topics discussed. Pt has the transplant phone #, and is aware that the team will continue to be available.    Methods used: Verbal discussion, Patient Guide, discharge paperwork    Evaluation: The patient and family needs continuous reinforcement of post-transplant care teaching. Encouraged to read and review the Patient Guide left at bedside.     Time spent with patient: 45 minutes    Maricruz Noonan NP  Heart Transplant Coordinator  [phone]

## 2022-06-30 NOTE — PROGRESS NOTE ADULT - PROBLEM SELECTOR PLAN 1
- s/p OHT with Dr. Earl/Maria C on 6/21 (ischemic time 261 min)  - IABP removed on POD 1  - plan to start sildenafil 10 mg PO TID today   - continue Lasix 40 mg PO BID   - remains on hydralazine 25 mg PO TID and Procardia 30 mg PO QD  - next RHC/EMBx schedule for Tuesday 7/5. If patient is discharged tomorrow will require COVID swab to be sent prior to discharge.

## 2022-06-30 NOTE — PROGRESS NOTE ADULT - SUBJECTIVE AND OBJECTIVE BOX
Follow Up:  s/p OHT    Interval History/ROS: Patient with mildly uptrended WBC      REVIEW OF SYSTEMS  [  ] ROS unobtainable because:    [ x ] All other systems negative except as noted below    Constitutional:  [ ] fever [ ] chills  [ ] weight loss  [ ]night sweat  [ ]poor appetite/PO intake [ ]fatigue   Skin:  [ ] rash [ ] phlebitis	  Eyes: [ ] icterus [ ] pain  [ ] discharge	  ENMT: [ ] sore throat  [ ] thrush [ ] ulcers [ ] exudates [ ]anosmia  Respiratory: [ ] dyspnea [ ] hemoptysis [ ] cough [ ] sputum	  Cardiovascular:  [ ] chest pain [ ] palpitations [ ] edema	  Gastrointestinal:  [ ] nausea [ ] vomiting [ ] diarrhea [ ] constipation [ ] pain	  Genitourinary:  [ ] dysuria [ ] frequency [ ] hematuria [ ] discharge [ ] flank pain  [ ] incontinence  Musculoskeletal:  [ ] myalgias [ ] arthralgias [ ] arthritis  [ ] back pain  Neurological:  [ ] headache [ ] weakness [ ] seizures  [ ] confusion/altered mental status    Allergies  No Known Allergies        ANTIMICROBIALS:    atovaquone  Suspension 1500 daily  cefepime   IVPB 1000 every 8 hours  nystatin    Suspension [id number] <User Schedule>  valGANciclovir 450 <User Schedule>  vancomycin    Solution 125 every 12 hours  vancomycin  IVPB 500 <User Schedule>        OTHER MEDS: MEDICATIONS  (STANDING):  furosemide    Tablet 40 two times a day  heparin   Injectable 5000 every 8 hours  hydrALAZINE 25 every 8 hours  insulin lispro (ADMELOG) corrective regimen sliding scale  three times a day before meals  insulin lispro (ADMELOG) corrective regimen sliding scale  at bedtime  insulin regular Infusion 3 <Continuous>  methylPREDNISolone sodium succinate Injectable 20 every 12 hours  mycophenolate mofetil 1000 every 12 hours  NIFEdipine XL 60 daily  pantoprazole    Tablet 40 before breakfast  polyethylene glycol 3350 17 daily  tacrolimus 4 <User Schedule>  traMADol 25 every 8 hours PRN      Vital Signs Last 24 Hrs  T(F): 99 (06-30-22 @ 07:19), Max: 99 (06-30-22 @ 07:19)    Vital Signs Last 24 Hrs  HR: 92 (06-30-22 @ 07:19) (90 - 100)  BP: 122/69 (06-30-22 @ 07:19) (108/57 - 129/75)  RR: 18 (06-30-22 @ 07:19)  SpO2: 95% (06-30-22 @ 07:19) (92% - 98%)  Wt(kg): --    EXAM:      Constitutional:no acute distress  Eyes:JOHNY, EOMI  Ear/Nose/Throat: no oral lesions, 	  Respiratory: clear BL  left upper chest old ICD site  chest tubes x2  Cardiovascular: S1S2 sternotomy site CDI  Gastrointestinal:soft, (+) BS, no tenderness  Extremities:no e/e/c  No Lymphadenopathy  IV sites not inflammed.    Labs:                        11.1   16.04 )-----------( 318      ( 30 Jun 2022 07:32 )             33.2     06-29    133<L>  |  102  |  47<H>  ----------------------------<  120<H>  5.0   |  22  |  1.70<H>    Ca    8.9      29 Jun 2022 00:47  Phos  2.3     06-29  Mg     1.9     06-29    TPro  5.4<L>  /  Alb  3.4  /  TBili  0.6  /  DBili  x   /  AST  15  /  ALT  27  /  AlkPhos  45  06-29      WBC Trend:  WBC Count: 16.04 (06-30-22 @ 07:32)  WBC Count: 15.47 (06-29-22 @ 00:47)  WBC Count: 12.94 (06-28-22 @ 00:19)  WBC Count: 14.42 (06-26-22 @ 23:56)      Creatine Trend:  Creatinine, Serum: 1.70 (06-29)  Creatinine, Serum: 1.65 (06-28)  Creatinine, Serum: 2.32 (06-26)  Creatinine, Serum: 1.77 (06-26)      Liver Biochemical Testing Trend:  Alanine Aminotransferase (ALT/SGPT): 27 (06-29)  Alanine Aminotransferase (ALT/SGPT): 24 (06-28)  Alanine Aminotransferase (ALT/SGPT): 27 (06-26)  Alanine Aminotransferase (ALT/SGPT): 29 (06-26)  Alanine Aminotransferase (ALT/SGPT): 30 (06-25)  Aspartate Aminotransferase (AST/SGOT): 15 (06-29-22 @ 00:47)  Aspartate Aminotransferase (AST/SGOT): 16 (06-28-22 @ 00:18)  Aspartate Aminotransferase (AST/SGOT): 16 (06-26-22 @ 23:56)  Aspartate Aminotransferase (AST/SGOT): 19 (06-26-22 @ 01:06)  Aspartate Aminotransferase (AST/SGOT): 23 (06-25-22 @ 00:39)  Bilirubin Total, Serum: 0.6 (06-29)  Bilirubin Total, Serum: 0.6 (06-28)  Bilirubin Total, Serum: 0.7 (06-26)  Bilirubin Total, Serum: 0.8 (06-26)  Bilirubin Total, Serum: 0.7 (06-25)      Trend LDH  05-27-22 @ 15:23  239          MICROBIOLOGY:  Vancomycin Level, Trough: 6.2 (06-29 @ 09:19)  Vancomycin Level, Trough: 6.7 (06-28 @ 09:40)  Vancomycin Level, Trough: 20.0 (06-26 @ 23:56)  Vancomycin Level, Trough: 16.8 (06-26 @ 12:31)  Vancomycin Level, Trough: 13.8 (06-25 @ 23:13)    MRSA PCR Result.: NotDetec (06-21-22 @ 08:54)  MRSA PCR Result.: NotDetec (05-27-22 @ 15:11)      Culture - Acid Fast - Body Fluid w/Smear (collected 21 Jun 2022 23:09)  Source: .Body Fluid Other  Preliminary Report:    No growth at 1 week.    Culture - Fungal, Body Fluid (collected 21 Jun 2022 23:09)  Source: .Body Fluid Other  Preliminary Report:    No growth    Culture - Body Fluid with Gram Stain (collected 21 Jun 2022 23:09)  Source: .Body Fluid PROCUREMENT FLUID  Final Report:    No growth at 5 days    Culture - Fungal, Other (collected 21 Jun 2022 23:09)  Source: .Other Other  Preliminary Report:    No growth    Culture - Surgical Swab (collected 21 Jun 2022 23:09)  Source: .Surgical Swab AICD POCKET  Final Report:    Growth in fluid media only Staphylococcus epidermidis    Growth in fluid media only Morganella morganii  Organism: Staphylococcus epidermidis  Morganella morganii  Organism: Morganella morganii    Sensitivities:      -  Amikacin: S <=16      -  Amoxicillin/Clavulanic Acid: R >16/8      -  Ampicillin: R >16 These ampicillin results predict results for amoxicillin      -  Ampicillin/Sulbactam: I 16/8 Enterobacter, Klebsiella aerogenes, Citrobacter, and Serratia may develop resistance during prolonged therapy (3-4 days)      -  Aztreonam: S <=4      -  Cefazolin: R >16 Enterobacter, Klebsiella aerogenes, Citrobacter, and Serratia may develop resistance during prolonged therapy (3-4 days)      -  Cefepime: S <=2      -  Cefoxitin: S <=8      -  Ceftriaxone: S <=1 Enterobacter, Klebsiella aerogenes, Citrobacter, and Serratia may develop resistance during prolonged therapy      -  Ciprofloxacin: S <=0.25      -  Ertapenem: S <=0.5      -  Gentamicin: S <=2      -  Imipenem: S <=1      -  Levofloxacin: S <=0.5      -  Meropenem: S <=1      -  Piperacillin/Tazobactam: S <=8      -  Tobramycin: S <=2      -  Trimethoprim/Sulfamethoxazole: S <=0.5/9.5      Method Type: ANABELL  Organism: Staphylococcus epidermidis    Sensitivities:      -  Ampicillin/Sulbactam: R <=8/4      -  Cefazolin: R <=4      -  Clindamycin: R 2 This isolate is presumed to be clindamycin resistant based on detection of inducible resistance. Clindamycin may still be effective in some patients.      -  Erythromycin: R >4      -  Gentamicin: R >8 Should not be used as monotherapy      -  Oxacillin: R >2      -  Penicillin: R 8      -  Rifampin: S <=1 Should not be used as monotherapy      -  Tetra/Doxy: S <=1      -  Trimethoprim/Sulfamethoxazole: R >2/38      -  Vancomycin: S 2      Method Type: ANABELL    Culture - Tissue with Gram Stain (collected 21 Jun 2022 23:09)  Source: .Tissue Other  Final Report:    No growth at 5 days    Culture - Fungal, Tissue (collected 21 Jun 2022 23:09)  Source: .Tissue Other  Preliminary Report:    No growth    Culture - Acid Fast - Tissue w/Smear (collected 21 Jun 2022 23:09)  Source: .Tissue Other  Preliminary Report:    No growth at 1 week.    Culture - Blood (collected 07 Jun 2022 22:47)  Source: .Blood Blood  Final Report:    No Growth Final    Culture - Blood (collected 07 Jun 2022 22:47)  Source: .Blood Blood  Final Report:    No Growth Final          HIV-1 RNA Quantitative, Viral Load: NOT DET. copies/mL (05-27-22 @ 15:30)  HIV-1 Viral Load Result: NOT DET. (05-27-22 @ 15:30)    [facility] (06-26-22 @ 23:56)          RADIOLOGY:  imaging below personally reviewed      < from: [facility] Duplex Upper Ext Vein Scan, Bilat (06.28.22 @ 15:34) >  There is acute, partially occlusive thrombus affecting the left subclavian vein. The right basilic and cephalic veins, superficial veins, are again  demonstrated to be thrombosed.   < end of copied text >        < from: Xray Chest 1 View- PORTABLE-Routine (Xray Chest 1 View- PORTABLE-Routine in AM.) (06.27.22 @ 02:42) >  Bibasilar atelectasis. No significant interval change.    < end of copied text >                       Follow Up:  s/p OHT    Interval History/ROS: Patient with mildly uptrended WBC. Afebrile      REVIEW OF SYSTEMS  [  ] ROS unobtainable because:    [ x ] All other systems negative except as noted below    Constitutional:  [ ] fever [ ] chills  [ ] weight loss  [ ]night sweat  [ ]poor appetite/PO intake [ ]fatigue   Skin:  [ ] rash [ ] phlebitis	  Eyes: [ ] icterus [ ] pain  [ ] discharge	  ENMT: [ ] sore throat  [ ] thrush [ ] ulcers [ ] exudates [ ]anosmia  Respiratory: [ ] dyspnea [ ] hemoptysis [ ] cough [ ] sputum	  Cardiovascular:  [ ] chest pain [ ] palpitations [ ] edema	  Gastrointestinal:  [ ] nausea [ ] vomiting [ ] diarrhea [ ] constipation [ ] pain	  Genitourinary:  [ ] dysuria [ ] frequency [ ] hematuria [ ] discharge [ ] flank pain  [ ] incontinence  Musculoskeletal:  [ ] myalgias [ ] arthralgias [ ] arthritis  [ ] back pain  Neurological:  [ ] headache [ ] weakness [ ] seizures  [ ] confusion/altered mental status    Allergies  No Known Allergies        ANTIMICROBIALS:    atovaquone  Suspension 1500 daily  cefepime   IVPB 1000 every 8 hours  nystatin    Suspension [id number] <User Schedule>  valGANciclovir 450 <User Schedule>  vancomycin    Solution 125 every 12 hours  vancomycin  IVPB 500 <User Schedule>        OTHER MEDS: MEDICATIONS  (STANDING):  furosemide    Tablet 40 two times a day  heparin   Injectable 5000 every 8 hours  hydrALAZINE 25 every 8 hours  insulin lispro (ADMELOG) corrective regimen sliding scale  three times a day before meals  insulin lispro (ADMELOG) corrective regimen sliding scale  at bedtime  insulin regular Infusion 3 <Continuous>  methylPREDNISolone sodium succinate Injectable 20 every 12 hours  mycophenolate mofetil 1000 every 12 hours  NIFEdipine XL 60 daily  pantoprazole    Tablet 40 before breakfast  polyethylene glycol 3350 17 daily  tacrolimus 4 <User Schedule>  traMADol 25 every 8 hours PRN      Vital Signs Last 24 Hrs  T(F): 99 (06-30-22 @ 07:19), Max: 99 (06-30-22 @ 07:19)    Vital Signs Last 24 Hrs  HR: 92 (06-30-22 @ 07:19) (90 - 100)  BP: 122/69 (06-30-22 @ 07:19) (108/57 - 129/75)  RR: 18 (06-30-22 @ 07:19)  SpO2: 95% (06-30-22 @ 07:19) (92% - 98%)  Wt(kg): --    EXAM:      Constitutional:no acute distress  Eyes:JOHNY, EOMI  Ear/Nose/Throat: no oral lesions, 	  Respiratory: clear BL  left upper chest old ICD site  chest tubes x2  Cardiovascular: S1S2 sternotomy site CDI  Gastrointestinal:soft, (+) BS, no tenderness  Extremities:no e/e/c  No Lymphadenopathy  IV sites not inflammed.    Labs:                        11.1   16.04 )-----------( 318      ( 30 Jun 2022 07:32 )             33.2     06-29    133<L>  |  102  |  47<H>  ----------------------------<  120<H>  5.0   |  22  |  1.70<H>    Ca    8.9      29 Jun 2022 00:47  Phos  2.3     06-29  Mg     1.9     06-29    TPro  5.4<L>  /  Alb  3.4  /  TBili  0.6  /  DBili  x   /  AST  15  /  ALT  27  /  AlkPhos  45  06-29      WBC Trend:  WBC Count: 16.04 (06-30-22 @ 07:32)  WBC Count: 15.47 (06-29-22 @ 00:47)  WBC Count: 12.94 (06-28-22 @ 00:19)  WBC Count: 14.42 (06-26-22 @ 23:56)      Creatine Trend:  Creatinine, Serum: 1.70 (06-29)  Creatinine, Serum: 1.65 (06-28)  Creatinine, Serum: 2.32 (06-26)  Creatinine, Serum: 1.77 (06-26)      Liver Biochemical Testing Trend:  Alanine Aminotransferase (ALT/SGPT): 27 (06-29)  Alanine Aminotransferase (ALT/SGPT): 24 (06-28)  Alanine Aminotransferase (ALT/SGPT): 27 (06-26)  Alanine Aminotransferase (ALT/SGPT): 29 (06-26)  Alanine Aminotransferase (ALT/SGPT): 30 (06-25)  Aspartate Aminotransferase (AST/SGOT): 15 (06-29-22 @ 00:47)  Aspartate Aminotransferase (AST/SGOT): 16 (06-28-22 @ 00:18)  Aspartate Aminotransferase (AST/SGOT): 16 (06-26-22 @ 23:56)  Aspartate Aminotransferase (AST/SGOT): 19 (06-26-22 @ 01:06)  Aspartate Aminotransferase (AST/SGOT): 23 (06-25-22 @ 00:39)  Bilirubin Total, Serum: 0.6 (06-29)  Bilirubin Total, Serum: 0.6 (06-28)  Bilirubin Total, Serum: 0.7 (06-26)  Bilirubin Total, Serum: 0.8 (06-26)  Bilirubin Total, Serum: 0.7 (06-25)      Trend LDH  05-27-22 @ 15:23  239          MICROBIOLOGY:  Vancomycin Level, Trough: 6.2 (06-29 @ 09:19)  Vancomycin Level, Trough: 6.7 (06-28 @ 09:40)  Vancomycin Level, Trough: 20.0 (06-26 @ 23:56)  Vancomycin Level, Trough: 16.8 (06-26 @ 12:31)  Vancomycin Level, Trough: 13.8 (06-25 @ 23:13)    MRSA PCR Result.: NotDetec (06-21-22 @ 08:54)  MRSA PCR Result.: NotDetec (05-27-22 @ 15:11)      Culture - Acid Fast - Body Fluid w/Smear (collected 21 Jun 2022 23:09)  Source: .Body Fluid Other  Preliminary Report:    No growth at 1 week.    Culture - Fungal, Body Fluid (collected 21 Jun 2022 23:09)  Source: .Body Fluid Other  Preliminary Report:    No growth    Culture - Body Fluid with Gram Stain (collected 21 Jun 2022 23:09)  Source: .Body Fluid PROCUREMENT FLUID  Final Report:    No growth at 5 days    Culture - Fungal, Other (collected 21 Jun 2022 23:09)  Source: .Other Other  Preliminary Report:    No growth    Culture - Surgical Swab (collected 21 Jun 2022 23:09)  Source: .Surgical Swab AICD POCKET  Final Report:    Growth in fluid media only Staphylococcus epidermidis    Growth in fluid media only Morganella morganii  Organism: Staphylococcus epidermidis  Morganella morganii  Organism: Morganella morganii    Sensitivities:      -  Amikacin: S <=16      -  Amoxicillin/Clavulanic Acid: R >16/8      -  Ampicillin: R >16 These ampicillin results predict results for amoxicillin      -  Ampicillin/Sulbactam: I 16/8 Enterobacter, Klebsiella aerogenes, Citrobacter, and Serratia may develop resistance during prolonged therapy (3-4 days)      -  Aztreonam: S <=4      -  Cefazolin: R >16 Enterobacter, Klebsiella aerogenes, Citrobacter, and Serratia may develop resistance during prolonged therapy (3-4 days)      -  Cefepime: S <=2      -  Cefoxitin: S <=8      -  Ceftriaxone: S <=1 Enterobacter, Klebsiella aerogenes, Citrobacter, and Serratia may develop resistance during prolonged therapy      -  Ciprofloxacin: S <=0.25      -  Ertapenem: S <=0.5      -  Gentamicin: S <=2      -  Imipenem: S <=1      -  Levofloxacin: S <=0.5      -  Meropenem: S <=1      -  Piperacillin/Tazobactam: S <=8      -  Tobramycin: S <=2      -  Trimethoprim/Sulfamethoxazole: S <=0.5/9.5      Method Type: ANABELL  Organism: Staphylococcus epidermidis    Sensitivities:      -  Ampicillin/Sulbactam: R <=8/4      -  Cefazolin: R <=4      -  Clindamycin: R 2 This isolate is presumed to be clindamycin resistant based on detection of inducible resistance. Clindamycin may still be effective in some patients.      -  Erythromycin: R >4      -  Gentamicin: R >8 Should not be used as monotherapy      -  Oxacillin: R >2      -  Penicillin: R 8      -  Rifampin: S <=1 Should not be used as monotherapy      -  Tetra/Doxy: S <=1      -  Trimethoprim/Sulfamethoxazole: R >2/38      -  Vancomycin: S 2      Method Type: ANABELL    Culture - Tissue with Gram Stain (collected 21 Jun 2022 23:09)  Source: .Tissue Other  Final Report:    No growth at 5 days    Culture - Fungal, Tissue (collected 21 Jun 2022 23:09)  Source: .Tissue Other  Preliminary Report:    No growth    Culture - Acid Fast - Tissue w/Smear (collected 21 Jun 2022 23:09)  Source: .Tissue Other  Preliminary Report:    No growth at 1 week.    Culture - Blood (collected 07 Jun 2022 22:47)  Source: .Blood Blood  Final Report:    No Growth Final    Culture - Blood (collected 07 Jun 2022 22:47)  Source: .Blood Blood  Final Report:    No Growth Final          HIV-1 RNA Quantitative, Viral Load: NOT DET. copies/mL (05-27-22 @ 15:30)  HIV-1 Viral Load Result: NOT DET. (05-27-22 @ 15:30)    Cytomegalovirus By PCR: NotDete (06-26-22 @ 23:56)          RADIOLOGY:  imaging below personally reviewed      < from: [facility] Duplex Upper Ext Vein Scan, Bilat (06.28.22 @ 15:34) >  There is acute, partially occlusive thrombus affecting the left subclavian vein. The right basilic and cephalic veins, superficial veins, are again  demonstrated to be thrombosed.   < end of copied text >        < from: Xray Chest 1 View- PORTABLE-Routine (Xray Chest 1 View- PORTABLE-Routine in AM.) (06.27.22 @ 02:42) >  Bibasilar atelectasis. No significant interval change.    < end of copied text >                       Follow Up:  s/p OHT    Interval History/ROS: Patient with mildly uptrended WBC. Afebrile. Pt with no new complaints      REVIEW OF SYSTEMS  [  ] ROS unobtainable because:    [ x ] All other systems negative except as noted below    Constitutional:  [ ] fever [ ] chills  [ ] weight loss  [ ]night sweat  [ ]poor appetite/PO intake [ ]fatigue   Skin:  [ ] rash [ ] phlebitis	  Eyes: [ ] icterus [ ] pain  [ ] discharge	  ENMT: [ ] sore throat  [ ] thrush [ ] ulcers [ ] exudates [ ]anosmia  Respiratory: [ ] dyspnea [ ] hemoptysis [ ] cough [ ] sputum	  Cardiovascular:  [ ] chest pain [ ] palpitations [ ] edema	  Gastrointestinal:  [ ] nausea [ ] vomiting [ ] diarrhea [ ] constipation [ ] pain	  Genitourinary:  [ ] dysuria [ ] frequency [ ] hematuria [ ] discharge [ ] flank pain  [ ] incontinence  Musculoskeletal:  [ ] myalgias [ ] arthralgias [ ] arthritis  [ ] back pain  Neurological:  [ ] headache [ ] weakness [ ] seizures  [ ] confusion/altered mental status    Allergies  No Known Allergies        ANTIMICROBIALS:    atovaquone  Suspension 1500 daily  cefepime   IVPB 1000 every 8 hours  nystatin    Suspension [id number] <User Schedule>  valGANciclovir 450 <User Schedule>  vancomycin    Solution 125 every 12 hours  vancomycin  IVPB 500 <User Schedule>        OTHER MEDS: MEDICATIONS  (STANDING):  furosemide    Tablet 40 two times a day  heparin   Injectable 5000 every 8 hours  hydrALAZINE 25 every 8 hours  insulin lispro (ADMELOG) corrective regimen sliding scale  three times a day before meals  insulin lispro (ADMELOG) corrective regimen sliding scale  at bedtime  insulin regular Infusion 3 <Continuous>  methylPREDNISolone sodium succinate Injectable 20 every 12 hours  mycophenolate mofetil 1000 every 12 hours  NIFEdipine XL 60 daily  pantoprazole    Tablet 40 before breakfast  polyethylene glycol 3350 17 daily  tacrolimus 4 <User Schedule>  traMADol 25 every 8 hours PRN      Vital Signs Last 24 Hrs  T(F): 99 (06-30-22 @ 07:19), Max: 99 (06-30-22 @ 07:19)    Vital Signs Last 24 Hrs  HR: 92 (06-30-22 @ 07:19) (90 - 100)  BP: 122/69 (06-30-22 @ 07:19) (108/57 - 129/75)  RR: 18 (06-30-22 @ 07:19)  SpO2: 95% (06-30-22 @ 07:19) (92% - 98%)  Wt(kg): --    EXAM:      Constitutional: No acute distress  Eyes: PERRLA, EOMI  Ear/Nose/Throat: no oral lesions, 	  Respiratory: Clear BL, Midline sternotomy incision and left upper chest old ICD site incision site looks good. chest tubes x2  Cardiovascular: S1S2 sternotomy site CDI  Gastrointestinal: Soft, (+) BS, no tenderness  Extremities: no e/e/c  No Lymphadenopathy  IV sites not Inflammed.    Labs:                        11.1   16.04 )-----------( 318      ( 30 Jun 2022 07:32 )             33.2     06-29    133<L>  |  102  |  47<H>  ----------------------------<  120<H>  5.0   |  22  |  1.70<H>    Ca    8.9      29 Jun 2022 00:47  Phos  2.3     06-29  Mg     1.9     06-29    TPro  5.4<L>  /  Alb  3.4  /  TBili  0.6  /  DBili  x   /  AST  15  /  ALT  27  /  AlkPhos  45  06-29      WBC Trend:  WBC Count: 16.04 (06-30-22 @ 07:32)  WBC Count: 15.47 (06-29-22 @ 00:47)  WBC Count: 12.94 (06-28-22 @ 00:19)  WBC Count: 14.42 (06-26-22 @ 23:56)      Creatine Trend:  Creatinine, Serum: 1.70 (06-29)  Creatinine, Serum: 1.65 (06-28)  Creatinine, Serum: 2.32 (06-26)  Creatinine, Serum: 1.77 (06-26)      Liver Biochemical Testing Trend:  Alanine Aminotransferase (ALT/SGPT): 27 (06-29)  Alanine Aminotransferase (ALT/SGPT): 24 (06-28)  Alanine Aminotransferase (ALT/SGPT): 27 (06-26)  Alanine Aminotransferase (ALT/SGPT): 29 (06-26)  Alanine Aminotransferase (ALT/SGPT): 30 (06-25)  Aspartate Aminotransferase (AST/SGOT): 15 (06-29-22 @ 00:47)  Aspartate Aminotransferase (AST/SGOT): 16 (06-28-22 @ 00:18)  Aspartate Aminotransferase (AST/SGOT): 16 (06-26-22 @ 23:56)  Aspartate Aminotransferase (AST/SGOT): 19 (06-26-22 @ 01:06)  Aspartate Aminotransferase (AST/SGOT): 23 (06-25-22 @ 00:39)  Bilirubin Total, Serum: 0.6 (06-29)  Bilirubin Total, Serum: 0.6 (06-28)  Bilirubin Total, Serum: 0.7 (06-26)  Bilirubin Total, Serum: 0.8 (06-26)  Bilirubin Total, Serum: 0.7 (06-25)      Trend LDH  05-27-22 @ 15:23  239          MICROBIOLOGY:  Vancomycin Level, Trough: 6.2 (06-29 @ 09:19)  Vancomycin Level, Trough: 6.7 (06-28 @ 09:40)  Vancomycin Level, Trough: 20.0 (06-26 @ 23:56)  Vancomycin Level, Trough: 16.8 (06-26 @ 12:31)  Vancomycin Level, Trough: 13.8 (06-25 @ 23:13)    MRSA PCR Result.: NotDetec (06-21-22 @ 08:54)  MRSA PCR Result.: NotDetec (05-27-22 @ 15:11)      Culture - Acid Fast - Body Fluid w/Smear (collected 21 Jun 2022 23:09)  Source: .Body Fluid Other  Preliminary Report:    No growth at 1 week.    Culture - Fungal, Body Fluid (collected 21 Jun 2022 23:09)  Source: .Body Fluid Other  Preliminary Report:    No growth    Culture - Body Fluid with Gram Stain (collected 21 Jun 2022 23:09)  Source: .Body Fluid PROCUREMENT FLUID  Final Report:    No growth at 5 days    Culture - Fungal, Other (collected 21 Jun 2022 23:09)  Source: .Other Other  Preliminary Report:    No growth    Culture - Surgical Swab (collected 21 Jun 2022 23:09)  Source: .Surgical Swab AICD POCKET  Final Report:    Growth in fluid media only Staphylococcus epidermidis    Growth in fluid media only Morganella morganii  Organism: Staphylococcus epidermidis  Morganella morganii  Organism: Morganella morganii    Sensitivities:      -  Amikacin: S <=16      -  Amoxicillin/Clavulanic Acid: R >16/8      -  Ampicillin: R >16 These ampicillin results predict results for amoxicillin      -  Ampicillin/Sulbactam: I 16/8 Enterobacter, Klebsiella aerogenes, Citrobacter, and Serratia may develop resistance during prolonged therapy (3-4 days)      -  Aztreonam: S <=4      -  Cefazolin: R >16 Enterobacter, Klebsiella aerogenes, Citrobacter, and Serratia may develop resistance during prolonged therapy (3-4 days)      -  Cefepime: S <=2      -  Cefoxitin: S <=8      -  Ceftriaxone: S <=1 Enterobacter, Klebsiella aerogenes, Citrobacter, and Serratia may develop resistance during prolonged therapy      -  Ciprofloxacin: S <=0.25      -  Ertapenem: S <=0.5      -  Gentamicin: S <=2      -  Imipenem: S <=1      -  Levofloxacin: S <=0.5      -  Meropenem: S <=1      -  Piperacillin/Tazobactam: S <=8      -  Tobramycin: S <=2      -  Trimethoprim/Sulfamethoxazole: S <=0.5/9.5      Method Type: ANABELL  Organism: Staphylococcus epidermidis    Sensitivities:      -  Ampicillin/Sulbactam: R <=8/4      -  Cefazolin: R <=4      -  Clindamycin: R 2 This isolate is presumed to be clindamycin resistant based on detection of inducible resistance. Clindamycin may still be effective in some patients.      -  Erythromycin: R >4      -  Gentamicin: R >8 Should not be used as monotherapy      -  Oxacillin: R >2      -  Penicillin: R 8      -  Rifampin: S <=1 Should not be used as monotherapy      -  Tetra/Doxy: S <=1      -  Trimethoprim/Sulfamethoxazole: R >2/38      -  Vancomycin: S 2      Method Type: ANABELL    Culture - Tissue with Gram Stain (collected 21 Jun 2022 23:09)  Source: .Tissue Other  Final Report:    No growth at 5 days    Culture - Fungal, Tissue (collected 21 Jun 2022 23:09)  Source: .Tissue Other  Preliminary Report:    No growth    Culture - Acid Fast - Tissue w/Smear (collected 21 Jun 2022 23:09)  Source: .Tissue Other  Preliminary Report:    No growth at 1 week.    Culture - Blood (collected 07 Jun 2022 22:47)  Source: .Blood Blood  Final Report:    No Growth Final    Culture - Blood (collected 07 Jun 2022 22:47)  Source: .Blood Blood  Final Report:    No Growth Final          HIV-1 RNA Quantitative, Viral Load: NOT DET. copies/mL (05-27-22 @ 15:30)  HIV-1 Viral Load Result: NOT DET. (05-27-22 @ 15:30)    Cytomegalovirus By PCR: [facility] (06-26-22 @ 23:56)          RADIOLOGY:  imaging below personally reviewed      < from: VA Duplex Upper Ext Vein Scan, Bilat (06.28.22 @ 15:34) >  There is acute, partially occlusive thrombus affecting the left subclavian vein. The right basilic and cephalic veins, superficial veins, are again  demonstrated to be thrombosed.   < end of copied text >        < from: Xray Chest 1 View- PORTABLE-Routine (Xray Chest 1 View- PORTABLE-Routine in AM.) (06.27.22 @ 02:42) >  Bibasilar atelectasis. No significant interval change.    < end of copied text >

## 2022-06-30 NOTE — PROGRESS NOTE ADULT - PROBLEM SELECTOR PLAN 3
continue postop care  ?d/c last two mediastinal ct today -d/w Dr. Maria C carver dosing daily as per level  medication regimen as per transplant team   continue vanco/ cefepime for AICD pocket infection  pulm toilet  pain management  increase activity as tolerated  LE santa neg dvt today 6/30  discharge planning-home ? fri - ck covid pcr in am fri   next cardiac biopsy scheduled 7/5 tue

## 2022-06-30 NOTE — PROGRESS NOTE ADULT - SUBJECTIVE AND OBJECTIVE BOX
Subjective: Patient seen and examined resting in chair.     Medications:  atovaquone  Suspension 1500 milliGRAM(s) Oral daily  cefepime   IVPB 1000 milliGRAM(s) IV Intermittent every 8 hours  chlorhexidine 2% Cloths 1 Application(s) Topical <User Schedule>  furosemide    Tablet 40 milliGRAM(s) Oral two times a day  heparin   Injectable 5000 Unit(s) SubCutaneous every 8 hours  hydrALAZINE 25 milliGRAM(s) Oral every 8 hours  insulin lispro (ADMELOG) corrective regimen sliding scale   SubCutaneous three times a day before meals  insulin lispro (ADMELOG) corrective regimen sliding scale   SubCutaneous at bedtime  insulin regular Infusion 3 Unit(s)/Hr IV Continuous <Continuous>  methylPREDNISolone sodium succinate Injectable 20 milliGRAM(s) IV Push every 12 hours  multivitamin 1 Tablet(s) Oral daily  mycophenolate mofetil 1000 milliGRAM(s) Oral every 12 hours  NIFEdipine XL 60 milliGRAM(s) Oral daily  nystatin    Suspension 821953 Unit(s) Oral <User Schedule>  pantoprazole    Tablet 40 milliGRAM(s) Oral before breakfast  polyethylene glycol 3350 17 Gram(s) Oral daily  tacrolimus 4 milliGRAM(s) Oral <User Schedule>  traMADol 25 milliGRAM(s) Oral every 8 hours PRN  valGANciclovir 450 milliGRAM(s) Oral <User Schedule>  vancomycin    Solution 125 milliGRAM(s) Oral every 12 hours  vancomycin  IVPB 500 milliGRAM(s) IV Intermittent <User Schedule>      Vitals:  Vital Signs Last 24 Hours  T(C): 36.4 (06-30-22 @ 03:11), Max: 36.6 (06-29-22 @ 08:00)  HR: 90 (06-30-22 @ 03:11) (90 - 100)  BP: 127/71 (06-30-22 @ 03:11) (108/57 - 144/80)  RR: 18 (06-30-22 @ 03:11) (18 - 22)  SpO2: 93% (06-30-22 @ 03:11) (92% - 100%)        I&O's Summary    29 Jun 2022 07:01  -  30 Jun 2022 07:00  --------------------------------------------------------  IN: 1179.4 mL / OUT: 4105 mL / NET: -2925.6 mL        Physical Exam  General: No distress. Comfortable.  HEENT: EOM intact.  Neck: Neck supple. JVP not elevated. No masses  Chest: Clear to auscultation bilaterally  CV: Normal S1 and S2. No murmurs, rub, or gallops. Radial pulses normal.  Abdomen: Soft, non-distended, non-tender  Neurology: Alert and oriented times three.       Labs:                        10.5   15.47 )-----------( 282      ( 29 Jun 2022 00:47 )             31.6     06-29    133<L>  |  102  |  47<H>  ----------------------------<  120<H>  5.0   |  22  |  1.70<H>    Ca    8.9      29 Jun 2022 00:47  Phos  2.3     06-29  Mg     1.9     06-29    TPro  5.4<L>  /  Alb  3.4  /  TBili  0.6  /  DBili  x   /  AST  15  /  ALT  27  /  AlkPhos  45  06-29    PT/INR - ( 29 Jun 2022 00:47 )   PT: 12.4 sec;   INR: 1.08 ratio                           Imaging Studies

## 2022-06-30 NOTE — PROGRESS NOTE ADULT - PROBLEM SELECTOR PLAN 4
- renal function is overall improving  - diuresis as needed as stated above  - cardiorenal consulted, appreciate recommendations

## 2022-06-30 NOTE — PROGRESS NOTE ADULT - ASSESSMENT
66M male with PMHx HTN, HLD, type 2 DM, chronic HFrEF,  (EF 25-30% by Echo) who underwent OHT on 6/21/22, currently on high dose steroid taper. Endocrinology consulted for assistance with management of steroid induced hyperglycemia/dm2.     Steroid induced hyperglycemia   DM2, controlled, recently dx  s/p Methlprednisolone 24mg IV q12h  6/30 Methylprednisolone 20mg IV q12h  7/1 Prednisone 20mg q12h  7/2 Prednisone 15mg q12h   home regimen: Only on Glimepiride 1mg daily - recently started ~ May 2022  insulin ggt running at a rate of 2-4  units per hour as of 10 AM today  Recommendations:   - Goal -180  - Hypoglycemia protocol   - Carb Consistent Diet   - Nutrition consult   - Provider to RN for dm/insulin teaching         DC Planning: oral regimen vs basal/bolus insulin pens, tbd, depending on insulin requirements & steroid doses at the time of discharge.   Please send test scripts for basal insulin pen (ie. Basaglar, Lantus, Tresiba, Toujeo, Levemir) and bolus insulin pen(ie. humalog/novolog/admelog) to check for insurance coverage Please send prescriptions for diabetes supplies (glucometer, test strips, lancets, alcohol swabs, insulin pen needles). Routine outpatient opthalmology & podiatry evaluations recommended. Patient can follow up with endocrinology at the location provided below:     [facility]   [address] [place] [address] [phone]    HTN  Recommendations:   - outpt BP goal < 130/80   - defer to primary team   - outpatient annual urinary microalb/cr ratio    HLD  Recommendations:   - LDL goal < 70   - defer to primary team   - outpatient fasting lipid profile        66M male with PMHx HTN, HLD, type 2 DM, chronic HFrEF,  (EF 25-30% by Echo) who underwent OHT on 6/21/22, currently on high dose steroid taper. Endocrinology consulted for assistance with management of steroid induced hyperglycemia/dm2.     Steroid induced hyperglycemia   DM2, controlled, recently dx  s/p Methlprednisolone 24mg IV q12h  6/30 Methylprednisolone 20mg IV q12h  7/1 Prednisone 20mg q12h  7/2 Prednisone 15mg q12h   home regimen: Only on Glimepiride 1mg daily - recently started ~ May 2022  Recommendations:   - Goal -180  - Hypoglycemia protocol   - Carb Consistent Diet   - Nutrition consult   - insulin drip stopped 6/29/22 with mild hyperglycemia  - recommend lantus 12 units QHS (slightly lower than weight based dosing)  - continue moderate dose admelog correction scale premeal/before bed  - if premeal FSG> 180 tonight, can order admelog 3 units with meals    DC Planning: oral regimen vs basal/bolus insulin pens, tbd, depending on insulin requirements & steroid doses at the time of discharge.   Please send test scripts for basal insulin pen (ie. Basaglar, Lantus, Tresiba, Toujeo, Levemir) and bolus insulin pen(ie. humalog/novolog/admelog) to check for insurance coverage Please send prescriptions for diabetes supplies (glucometer, test strips, lancets, alcohol swabs, insulin pen needles). Routine outpatient opthalmology & podiatry evaluations recommended. Patient can follow up with endocrinology at the location provided below:     [facility]   [address] [place] [id number] [phone]    HTN  Recommendations:   - outpt BP goal < 130/80   - defer to primary team   - outpatient annual urinary microalb/cr ratio    HLD  Recommendations:   - LDL goal < 70   - defer to primary team   - outpatient fasting lipid profile     Discussed insulin plan with team  team to update orders

## 2022-06-30 NOTE — PROGRESS NOTE ADULT - PROBLEM SELECTOR PLAN 7
- postop course c/b leukocytosis, overall improving   - ICD pocket culture positive for staph epidermidis/ morganella morganii  - remains on Cefepime and Vanco IV. If patient is discharged home tomorrow will be discharged home on oral abx   - ID following

## 2022-06-30 NOTE — PROGRESS NOTE ADULT - ASSESSMENT
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 LHC did not reveal new obstructive CAD and he underwent EPS which did not reveal endocardial substrate amenable for ablation. RHC revealed severely depressed cardiac output and he was transferred to [facility] 5/26 for advanced therapies evaluation.    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 He improved significantly with sequential uptitration of inotropes and increased pacing rate, but on 6/6 he had worsening JAGRUTI. He is s/p RHC which revealed severely elevated filling pressures, severe cpcPH, and CI of 1.9 on milrinone 0.5. IABP was placed and his renal function/PAPs have improved. He is MCS dependent and was inadequately supported with high dose inotropes, so was listed UNOS status 2e for OHT, awaiting suitable donor. However, was made status 7 as he became febrile, last 6/7 T 38. He has been afebrile since and blood cultures from 6/7 with NGTD x 48 hours and his leukocytosis has not worsened. Discussed with transplant ID and since bld cx negative x48hrs he has been re-listed UNOS status 2e.     A suitable donor was identified and patient underwent OHT with Dr. Earl/Maria C on 6/21 (ischemic time 261 mins, CMV +/+, Toxo -/-). Patient was successfully extubated and IABP was removed on POD 1. Off Maricruz. He's overall progressing well. His volume status is overall improving though his renal function has taken a slight hit. Cultures from his ICD site in the OR are returning positive for staph epidermidis/ morganella morganii, remains on IV abx. His most recent RHC showed elevated PA pressures and slightly elevated filling pressures, will continue to optimize his medications. He is nearing readiness for discharge pending removal of his CT.          RHC/EMBx  6/28: RA 9, RV 3/11, PA 62/27/41, wedge 21 with v wave 31, PA sat 69.3%, /81/102, CO/Ci 6.87/3.62, grade 1R/2      Review of studies  TTE 5/21: LV 5.2 cm, LVEF 10-15%, LVOT VTI 10 cm, moderate RV dysfunction, mild AI, minimal MR  EKG: a-BiV paced  LHC 5/23: patent mLAD stent with slow flow, D1 with 40-50% stenosis, mild disease otherwise  RHC 5/26: RA 12, PA 70/40 (50), PCWP 22, Milana CO/CI 2.3/1.3, MAP 83 with SVR 2469, PVR 12 PERSAUD    Hemodynamics  5/27 (milrinone 0.25): RA 10, PA 76/35 (49), PCWP 34, PA sat 57% with Milana CO/CI 3.1/1.6, MAP 71 with SVR 1574, PVR 4.8  5/28 (on milrinone 0.375): RA 7, PA 63/31, PCWP 26, PA sat 72.8% with Milana CO/CI 4.9/2.5 (CI later dropped to 1.6 in the afternoon), MAP 70 with SVR 1039, PVR 2.9  5/29 (on milrinone 0.5): RA 8, PA 75/32 (50), PCWP 28, PA sat 74% with Milana CO/CI 5.0/2.6, MAP 77 with SVR 1200, PVR 4.4  5/29 (on milrinone 0.5 and nipride to 3 mcg/kg/min): RA 6, PA 59/31 (40), PCWP 30, PA sat 79% with Milana CO/CI 7.0/3.6, BP 97/57 (MAP 70) with , PVR 1.4  6/6 RHC (mil 0.5): RA 11 (v13), RV 75/17, PA 80/36/52, PCWP 24 (v29), PA sat 59.5%, CO/CI (F) 3.58/1.88  6/7 (mil 0.5, IABP 1:1): CVP 5, PA 66/32/45, PA sat 65.7%, CO/CI (F) 4.0/2.1, SVR 2000  6/8 (mil 0.5, IABP 1:1): CVP 1, PA 46/19/28, PA sat 72.7%, CO/CI (F) 5.6/2.9, SVR 1257  6/8 PM (mil 0.5, IABP 1:1): CVP 4, PA 68/25/38, PA sat 68%, CO/CI (f) 5/2.6, SVR 1326   67 YO M with a history of ACC/AHA Stage D mixed NICM/ICM (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 LHC did not reveal new obstructive CAD and he underwent EPS which did not reveal endocardial substrate amenable for ablation. RHC revealed severely depressed cardiac output and he was transferred to [facility] 5/26 for advanced therapies evaluation.    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 He improved significantly with sequential uptitration of inotropes and increased pacing rate, but on 6/6 he had worsening JAGRUTI. He is s/p RHC which revealed severely elevated filling pressures, severe cpcPH, and CI of 1.9 on milrinone 0.5. IABP was placed and his renal function/PAPs have improved. He is MCS dependent and was inadequately supported with high dose inotropes, so was listed UNOS status 2e for OHT, awaiting suitable donor. However, was made status 7 as he became febrile, last 6/7 T 38. He has been afebrile since and blood cultures from 6/7 with NGTD x 48 hours and his leukocytosis has not worsened. Discussed with transplant ID and since bld cx negative x48hrs he has been re-listed UNOS status 2e.     A suitable donor was identified and patient underwent OHT with Dr. Earl/Maria C on 6/21 (ischemic time 261 mins, CMV +/+, Toxo -/-). Patient was successfully extubated and IABP was removed on POD 1. Off Maricruz. He's overall progressing well. His volume status is overall improving though his renal function has taken a slight hit. Cultures from his ICD site in the OR are returning positive for staph epidermidis/ morganella morganii, remains on IV abx. His most recent RHC showed elevated PA pressures and slightly elevated filling pressures, will continue to optimize his medications. He is nearing readiness for discharge pending removal of his CT.      RHC/EMBx  6/28: RA 9, RV 3/11, PA 62/27/41, wedge 21 with v wave 31, PA sat 69.3%, /81/102, CO/Ci 6.87/3.62, grade 1R/2      Review of studies  TTE 5/21: LV 5.2 cm, LVEF 10-15%, LVOT VTI 10 cm, moderate RV dysfunction, mild AI, minimal MR  EKG: a-BiV paced  LHC 5/23: patent mLAD stent with slow flow, D1 with 40-50% stenosis, mild disease otherwise  RHC 5/26: RA 12, PA 70/40 (50), PCWP 22, Milana CO/CI 2.3/1.3, MAP 83 with SVR 2469, PVR 12 PERSAUD

## 2022-06-30 NOTE — PROGRESS NOTE ADULT - NS ATTEND AMEND GEN_ALL_CORE FT
s/p OHT on 6/21  cont  lasix 40 BID  cont hydralazine 25mg TID, add procardia xl 30mg po if MAP > 90     S/p RHC 6/28  RA 9, PA: 62/27(41), PCWP: 21 with V wave 31, PA sat 69% Milana CO/CI: 6.87/3.62, SVR 1094 BP: 141/81(102), PVVR: 3.35    IS: tacrolimus increased to 5mg BID, level 10  EMBx 6/28 1R/2, C4d negative,   TTE with normal LV function, RV dilated  daily trough levels  cont cellcept 1gm BID  steroid osmany     OI PPX: CMV +/+, Toxo -/-  cont valcyte , mepron   cont nystatin s/S  will start CAV ppx when stable    ICD pocket fluid cx + for morganella and staph epi  cont vanco and cefepime  follow levels    JAGRUTI  creat improved today  cont monitor    ambulate with PT  IS

## 2022-06-30 NOTE — PROGRESS NOTE ADULT - ASSESSMENT
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 5/20 with near syncope in setting of worsening HF symptoms and found to have 41 episodes of VT, many terminating with ATP. LHC did not reveal new obstructive CAD and he underwent EPS which did not reveal endocardial substrate amenable for ablation. RHC revealed severely depressed cardiac output and he was transferred to [facility] 5/26 for advanced therapies evaluation. Pt was evaluated bu transplant ID for potential OHT. He became febrile, last 6/7 T 38. He has been afebrile since and blood cultures from 6/7 with NGTD x 48 hours and his leukocytosis has not worsened. A suitable donor was identified and patient underwent OHT with Dr. Earl/Maria C on 6/21 (ischemic time 261 mins, CMV +/+, Toxo -/-). On POD 1 IABP was removed. Extubated afternoon 6/22. Cultures from his ICD site in the OR are returning positive for staph epidermidis/ morganella morganii, remains on IV abx.     WORKUP  Surgical Swab of AICD pocket (6/21): MRSE and Morganella morganii  VA Duplex Upper Ext Vein Scan, Bilat (06.28): There is acute, partially occlusive thrombus affecting the left subclavian vein. The right basilic and cephalic veins, superficial veins, are again  demonstrated to be thrombosed.   Xray Chest (06.27): Bibasilar atelectasis. No significant interval change.  Cytomegalovirus By PCR: [facility] (06-26    DIAGNOSIS and IMPRESSION  # AICD Positive Culture Finding (MRSE and Morganella)  # OHT 6/21 (CMV +/+, Toxo -/-)  # JAGRUTI/CKD   Afebrile, uptrending leukocytosis to 15k  Only in liquid media so could be contaminant but reasonable to cover given murky fluid noted around pocket at time of explant  Vanc T 20 on 6/26 and Vanc dose was held but became subtherapeutic on 6/28 and 6/29 -> Restarted yesterday at 500mg BID  Susceptibilities of Staph epi and Morganella suggest that in the future could simplify to Ceftriaxone plus Vancomycin    RECOMMENDATIONS  Continue Cefepime 1gm Q8 and Vancomycin -> Can switch to cefpodoxime and Doxy at time of discharge  Will f.u vanc level  CMV intermediate risk: c/w valcyte  toxo low risk: no PPx  PJP: c/w Mepron  Thrush: Nystatin  On PO Vancomycin as per CTU ppx protocol    Pt seen and examined. Case d/w attending and primary team    Jamin Morales MD, PGY4   ID fellow  [employer] Teams Preferred  After 5pm/weekends call [phone]  is a 66 year old gentleman with PMH off ACC/AHA Stage D mixed NICM/ICM (likely familial with strong FH and early arrhythmia history in his 30's) with LVED 5.2 cm and LVEF 10-15% s/p PPM upgraded to CRT-D, CAD s/p PCI to mLAD 4/2022, well controlled DM2 (A1c 6.2%), and CKD III (Cr 1.4) who initially presented to [facility] 5/20 with near syncope in setting of worsening HF symptoms and found to have 41 episodes of VT, many terminating with ATP. LHC did not reveal new obstructive CAD and he underwent EPS which did not reveal endocardial substrate amenable for ablation. RHC revealed severely depressed cardiac output and he was transferred to [facility] 5/26 for advanced therapies evaluation. Pt was evaluated bu transplant ID for potential OHT. He became febrile, last 6/7 T 38. He has been afebrile since and blood cultures from 6/7 with NGTD x 48 hours and his leukocytosis has not worsened. A suitable donor was identified and patient underwent OHT with Dr. Earl/Maria C on 6/21 (ischemic time 261 mins, CMV +/+, Toxo -/-). On POD 1 IABP was removed. Extubated afternoon 6/22. Cultures from his ICD site in the OR are returning positive for staph epidermidis/ morganella morganii, remains on IV abx.     WORKUP  Surgical Swab of AICD pocket (6/21): MRSE and Morganella morganii  VA Duplex Upper Ext Vein Scan, Bilat (06.28): There is acute, partially occlusive thrombus affecting the left subclavian vein. The right basilic and cephalic veins, superficial veins, are again  demonstrated to be thrombosed.   Xray Chest (06.27): Bibasilar atelectasis. No significant interval change.  [facility]    DIAGNOSIS and IMPRESSION  # AICD Positive Culture Finding (MRSE and Morganella)  # OHT 6/21 (CMV +/+, Toxo -/-)  # JAGRUTI/CKD   # LUE non-occlusive DVT and RUE superficial Thrombus  Afebrile, uptrending leukocytosis to 15k  Only in liquid media so could be contaminant but reasonable to cover given murky fluid noted around pocket at time of explant  Vanc T 20 on 6/26 and Vanc dose was held but became subtherapeutic on 6/28 and 6/29 -> Restarted yesterday at 500mg BID  Susceptibilities of Staph epi and Morganella suggest that in the future could simplify to Ceftriaxone plus Vancomycin    RECOMMENDATIONS  Continue Cefepime 1gm Q8 and Vancomycin -> Can switch to cefpodoxime and Doxy at time of discharge  Will f.u vanc level  Will aim for a total of 2-3 weeks of abx therapy from AICD explant date.   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 LHC did not reveal new obstructive CAD and he underwent EPS which did not reveal endocardial substrate amenable for ablation. RHC revealed severely depressed cardiac output and he was transferred to [facility] 5/26 for advanced therapies evaluation. Pt was evaluated bu transplant ID for potential OHT. He became febrile, last 6/7 T 38. He has been afebrile since and blood cultures from 6/7 with NGTD x 48 hours and his leukocytosis has not worsened. A suitable donor was identified and patient underwent OHT with Dr. Earl/Maria C on 6/21 (ischemic time 261 mins, CMV +/+, Toxo -/-). On POD 1 IABP was removed. Extubated afternoon 6/22. Cultures from his ICD site in the OR are returning positive for staph epidermidis/ morganella morganii, remains on IV abx.     WORKUP  Surgical Swab of AICD pocket (6/21): MRSE and Morganella morganii  VA Duplex Upper Ext Vein Scan, Bilat (06.28): There is acute, partially occlusive thrombus affecting the left subclavian vein. The right basilic and cephalic veins, superficial veins, are again  demonstrated to be thrombosed.   Xray Chest (06.27): Bibasilar atelectasis. No significant interval change.  [facility]    DIAGNOSIS and IMPRESSION  # AICD Positive Culture Finding (MRSE and Morganella)  # OHT 6/21 (CMV +/+, Toxo -/-)  # JAGRUTI/CKD   # LUE non-occlusive DVT and RUE superficial Thrombus  Afebrile, uptrending leukocytosis to 15k  Only in liquid media so could be contaminant but reasonable to cover given murky fluid noted around pocket at time of explant  Vanc T 20 on 6/26 and Vanc dose was held but became subtherapeutic on 6/28 and 6/29 -> Restarted on 6/28 at 500mg QD  Susceptibilities of Staph epi and Morganella suggest that in the future could simplify to Ceftriaxone plus Vancomycin    RECOMMENDATIONS  Continue Cefepime 1gm Q8 and Vancomycin -> Can switch to cefpodoxime and Doxy at time of discharge  Vanc level still low and Dose increased to Vanc 500mg Q12  Will aim for a total of 2-3 weeks of abx therapy from AICD explant date.   CMV intermediate risk: c/w valcyte  toxo low risk: no PPx  PJP: c/w Mepron  Thrush: Nystatin  On PO Vancomycin as per CTU ppx protocol    Jamin Morales MD, PGY4   ID fellow  [employer] Teams Preferred  After 5pm/weekends call [phone]

## 2022-06-30 NOTE — PROGRESS NOTE ADULT - SUBJECTIVE AND OBJECTIVE BOX
HPI:  66M male with PMHx HTN, HLD, type 2 DM, chronic HFrEF,  (EF 25-30% by Echo) who underwent OHT on 6/21/22, currently on high dose steroid taper. Endocrinology consulted for assistance with management of steroid induced hyperglycemia/dm2.     Interval hx:  6 hr drip requirement as of 10 am was ~14 units, ordered for diet  glucoses 136-202 today    MEDICATIONS  (STANDING):  atovaquone  Suspension 1500 milliGRAM(s) Oral daily  cefepime   IVPB 1000 milliGRAM(s) IV Intermittent every 8 hours  chlorhexidine 2% Cloths 1 Application(s) Topical <User Schedule>  furosemide    Tablet 40 milliGRAM(s) Oral two times a day  heparin   Injectable 5000 Unit(s) SubCutaneous every 8 hours  hydrALAZINE 25 milliGRAM(s) Oral every 8 hours  insulin lispro (ADMELOG) corrective regimen sliding scale   SubCutaneous three times a day before meals  insulin lispro (ADMELOG) corrective regimen sliding scale   SubCutaneous at bedtime  insulin regular Infusion 3 Unit(s)/Hr (3 mL/Hr) IV Continuous <Continuous>  methylPREDNISolone sodium succinate Injectable 20 milliGRAM(s) IV Push every 12 hours  multivitamin 1 Tablet(s) Oral daily  mycophenolate mofetil 1000 milliGRAM(s) Oral every 12 hours  NIFEdipine XL 60 milliGRAM(s) Oral daily  nystatin    Suspension 435572 Unit(s) Oral <User Schedule>  pantoprazole    Tablet 40 milliGRAM(s) Oral before breakfast  polyethylene glycol 3350 17 Gram(s) Oral daily  tacrolimus 4 milliGRAM(s) Oral <User Schedule>  valGANciclovir 450 milliGRAM(s) Oral <User Schedule>  vancomycin    Solution 125 milliGRAM(s) Oral every 12 hours  vancomycin  IVPB 500 milliGRAM(s) IV Intermittent <User Schedule>    MEDICATIONS  (PRN):  traMADol 25 milliGRAM(s) Oral every 8 hours PRN Moderate Pain (4 - 6)      Allergies    No Known Allergies    Intolerances        Review of Systems:  Constitutional: No fever, good appetite/po intake  Eyes: No blurry vision, diplopia  Neuro: No tremors  HEENT: No pain  Cardiovascular: No chest pain, palpitations  Respiratory: No SOB, no cough  GI: No nausea, vomiting,   : No dysuria, hematuria  Skin: no rash  Psych: no depression  Endocrine: no polyuria, polydipsia  Hem/lymph: no swelling  Osteoporosis: no fractures    ALL OTHER SYSTEMS REVIEWED AND NEGATIVE    PHYSICAL EXAM:  VITALS: T(C): 36.8 (06-30-22 @ 11:07)  T(F): 98.3 (06-30-22 @ 11:07), Max: 99 (06-30-22 @ 07:19)  HR: 97 (06-30-22 @ 11:07) (90 - 98)  BP: 105/61 (06-30-22 @ 11:07) (105/61 - 127/71)  RR:  (18 - 18)  SpO2:  (92% - 96%)  Wt(kg): --  GENERAL: NAD, well-groomed, well-developed  EYES: No proptosis, no lid lag, anicteric  HEENT:  Atraumatic, normocephalic, moist mucous membranes  RESPIRATORY: nonlabored respirations, no wheezing  PSYCH: Alert and oriented x 3, normal affect, normal mood    POCT Blood Glucose.: 202 mg/dL (06-30-22 @ 11:37)  POCT Blood Glucose.: 136 mg/dL (06-30-22 @ 08:10)  POCT Blood Glucose.: 181 mg/dL (06-29-22 @ 22:24)  POCT Blood Glucose.: 146 mg/dL (06-29-22 @ 19:25)  POCT Blood Glucose.: 98 mg/dL (06-29-22 @ 17:17)  POCT Blood Glucose.: 129 mg/dL (06-29-22 @ 16:04)  POCT Blood Glucose.: 123 mg/dL (06-29-22 @ 15:08)  POCT Blood Glucose.: 121 mg/dL (06-29-22 @ 13:52)  POCT Blood Glucose.: 125 mg/dL (06-29-22 @ 12:50)  POCT Blood Glucose.: 122 mg/dL (06-29-22 @ 11:38)  POCT Blood Glucose.: 174 mg/dL (06-29-22 @ 10:16)  POCT Blood Glucose.: 154 mg/dL (06-29-22 @ 09:02)  POCT Blood Glucose.: 138 mg/dL (06-29-22 @ 08:17)  POCT Blood Glucose.: 143 mg/dL (06-29-22 @ 06:50)  POCT Blood Glucose.: 544 mg/dL (06-29-22 @ 06:39)  POCT Blood Glucose.: 125 mg/dL (06-29-22 @ 05:05)  POCT Blood Glucose.: 112 mg/dL (06-29-22 @ 04:18)  POCT Blood Glucose.: 129 mg/dL (06-29-22 @ 02:45)  POCT Blood Glucose.: 96 mg/dL (06-29-22 @ 02:04)  POCT Blood Glucose.: 153 mg/dL (06-29-22 @ 00:57)  POCT Blood Glucose.: 111 mg/dL (06-29-22 @ 00:23)  POCT Blood Glucose.: 149 mg/dL (06-28-22 @ 22:35)  POCT Blood Glucose.: 135 mg/dL (06-28-22 @ 21:13)  POCT Blood Glucose.: 122 mg/dL (06-28-22 @ 20:07)  POCT Blood Glucose.: 164 mg/dL (06-28-22 @ 18:52)  POCT Blood Glucose.: 145 mg/dL (06-28-22 @ 17:58)  POCT Blood Glucose.: 175 mg/dL (06-28-22 @ 16:49)  POCT Blood Glucose.: 181 mg/dL (06-28-22 @ 16:06)  POCT Blood Glucose.: 183 mg/dL (06-28-22 @ 15:04)  POCT Blood Glucose.: 199 mg/dL (06-28-22 @ 13:49)  POCT Blood Glucose.: 191 mg/dL (06-28-22 @ 13:14)  POCT Blood Glucose.: 204 mg/dL (06-28-22 @ 11:58)  POCT Blood Glucose.: 194 mg/dL (06-28-22 @ 11:24)  POCT Blood Glucose.: 126 mg/dL (06-28-22 @ 10:03)  POCT Blood Glucose.: 115 mg/dL (06-28-22 @ 08:53)  POCT Blood Glucose.: 115 mg/dL (06-28-22 @ 06:46)  POCT Blood Glucose.: 86 mg/dL (06-28-22 @ 06:03)  POCT Blood Glucose.: 107 mg/dL (06-28-22 @ 05:19)  POCT Blood Glucose.: 120 mg/dL (06-28-22 @ 03:46)  POCT Blood Glucose.: 131 mg/dL (06-28-22 @ 02:53)  POCT Blood Glucose.: 111 mg/dL (06-28-22 @ 01:54)  POCT Blood Glucose.: 137 mg/dL (06-28-22 @ 01:02)  POCT Blood Glucose.: 161 mg/dL (06-27-22 @ 22:52)  POCT Blood Glucose.: 183 mg/dL (06-27-22 @ 22:07)  POCT Blood Glucose.: 183 mg/dL (06-27-22 @ 21:15)  POCT Blood Glucose.: 212 mg/dL (06-27-22 @ 20:09)  POCT Blood Glucose.: 99 mg/dL (06-27-22 @ 18:49)  POCT Blood Glucose.: 152 mg/dL (06-27-22 @ 17:54)  POCT Blood Glucose.: 159 mg/dL (06-27-22 @ 17:18)  POCT Blood Glucose.: 149 mg/dL (06-27-22 @ 15:56)  POCT Blood Glucose.: 185 mg/dL (06-27-22 @ 14:54)  POCT Blood Glucose.: 205 mg/dL (06-27-22 @ 14:13)                            11.1   16.04 )-----------( 318      ( 30 Jun 2022 07:32 )             33.2       06-30    134<L>  |  99  |  46<H>  ----------------------------<  143<H>  4.9   |  25  |  1.46<H>    eGFR: 53<L>    Ca    9.6      06-30  Mg     1.9     06-29  Phos  2.3     06-29    TPro  6.4  /  Alb  4.3  /  TBili  1.0  /  DBili  x   /  AST  14  /  ALT  24  /  AlkPhos  48  06-30      Thyroid Function Tests:  06-22 @ 21:13 TSH 0.57 FreeT4 -- T3 -- Anti TPO -- Anti Thyroglobulin Ab -- TSI --      05-20 Chol 159 Direct LDL -- LDL calculated 84 HDL 29<L> Trig 231<H>, 04-18 Chol 128 Direct LDL -- LDL calculated 41 HDL 34<L> Trig 267<H>, 04-18 Chol 142 Direct LDL -- LDL calculated 36 HDL 37<L> Trig 347<H>    Radiology:            HPI:  66M male with PMHx HTN, HLD, type 2 DM, chronic HFrEF,  (EF 25-30% by Echo) who underwent OHT on 6/21/22, currently on high dose steroid taper. Endocrinology consulted for assistance with management of steroid induced hyperglycemia/dm2.     Interval hx:  6 hr drip requirement as of 10 am on 6/29 was ~14 units. Per RN was stopped yesterday 6/29 around 6 pm. Ordered for diet. Has not received any basal insulin.  glucoses 136-202 today  Pt reports feeling well today.    MEDICATIONS  (STANDING):  atovaquone  Suspension 1500 milliGRAM(s) Oral daily  cefepime   IVPB 1000 milliGRAM(s) IV Intermittent every 8 hours  chlorhexidine 2% Cloths 1 Application(s) Topical <User Schedule>  furosemide    Tablet 40 milliGRAM(s) Oral two times a day  heparin   Injectable 5000 Unit(s) SubCutaneous every 8 hours  hydrALAZINE 25 milliGRAM(s) Oral every 8 hours  insulin lispro (ADMELOG) corrective regimen sliding scale   SubCutaneous three times a day before meals  insulin lispro (ADMELOG) corrective regimen sliding scale   SubCutaneous at bedtime  insulin regular Infusion 3 Unit(s)/Hr (3 mL/Hr) IV Continuous <Continuous>  methylPREDNISolone sodium succinate Injectable 20 milliGRAM(s) IV Push every 12 hours  multivitamin 1 Tablet(s) Oral daily  mycophenolate mofetil 1000 milliGRAM(s) Oral every 12 hours  NIFEdipine XL 60 milliGRAM(s) Oral daily  nystatin    Suspension 262839 Unit(s) Oral <User Schedule>  pantoprazole    Tablet 40 milliGRAM(s) Oral before breakfast  polyethylene glycol 3350 17 Gram(s) Oral daily  tacrolimus 4 milliGRAM(s) Oral <User Schedule>  valGANciclovir 450 milliGRAM(s) Oral <User Schedule>  vancomycin    Solution 125 milliGRAM(s) Oral every 12 hours  vancomycin  IVPB 500 milliGRAM(s) IV Intermittent <User Schedule>    MEDICATIONS  (PRN):  traMADol 25 milliGRAM(s) Oral every 8 hours PRN Moderate Pain (4 - 6)      Allergies    No Known Allergies    Intolerances        Review of Systems:  Constitutional: No fever, good appetite/po intake  Eyes: No blurry vision, diplopia  Neuro: No tremors  HEENT: No pain  Cardiovascular: No chest pain, palpitations  Respiratory: No SOB, no cough  GI: No nausea, vomiting,   : No dysuria, hematuria  Skin: no rash  Psych: no depression  Endocrine: no polyuria, polydipsia  Hem/lymph: no swelling  Osteoporosis: no fractures    ALL OTHER SYSTEMS REVIEWED AND NEGATIVE    PHYSICAL EXAM:  VITALS: T(C): 36.8 (06-30-22 @ 11:07)  T(F): 98.3 (06-30-22 @ 11:07), Max: 99 (06-30-22 @ 07:19)  HR: 97 (06-30-22 @ 11:07) (90 - 98)  BP: 105/61 (06-30-22 @ 11:07) (105/61 - 127/71)  RR:  (18 - 18)  SpO2:  (92% - 96%)  Wt(kg): --  GENERAL: NAD, well-groomed, well-developed  EYES: No proptosis, no lid lag, anicteric  HEENT:  Atraumatic, normocephalic, moist mucous membranes  RESPIRATORY: nonlabored respirations, no wheezing  PSYCH: Alert and oriented x 3, normal affect, normal mood    POCT Blood Glucose.: 202 mg/dL (06-30-22 @ 11:37)  POCT Blood Glucose.: 136 mg/dL (06-30-22 @ 08:10)  POCT Blood Glucose.: 181 mg/dL (06-29-22 @ 22:24)  POCT Blood Glucose.: 146 mg/dL (06-29-22 @ 19:25)  POCT Blood Glucose.: 98 mg/dL (06-29-22 @ 17:17)  POCT Blood Glucose.: 129 mg/dL (06-29-22 @ 16:04)  POCT Blood Glucose.: 123 mg/dL (06-29-22 @ 15:08)  POCT Blood Glucose.: 121 mg/dL (06-29-22 @ 13:52)  POCT Blood Glucose.: 125 mg/dL (06-29-22 @ 12:50)  POCT Blood Glucose.: 122 mg/dL (06-29-22 @ 11:38)  POCT Blood Glucose.: 174 mg/dL (06-29-22 @ 10:16)  POCT Blood Glucose.: 154 mg/dL (06-29-22 @ 09:02)  POCT Blood Glucose.: 138 mg/dL (06-29-22 @ 08:17)  POCT Blood Glucose.: 143 mg/dL (06-29-22 @ 06:50)  POCT Blood Glucose.: 544 mg/dL (06-29-22 @ 06:39)  POCT Blood Glucose.: 125 mg/dL (06-29-22 @ 05:05)  POCT Blood Glucose.: 112 mg/dL (06-29-22 @ 04:18)  POCT Blood Glucose.: 129 mg/dL (06-29-22 @ 02:45)  POCT Blood Glucose.: 96 mg/dL (06-29-22 @ 02:04)  POCT Blood Glucose.: 153 mg/dL (06-29-22 @ 00:57)  POCT Blood Glucose.: 111 mg/dL (06-29-22 @ 00:23)  POCT Blood Glucose.: 149 mg/dL (06-28-22 @ 22:35)  POCT Blood Glucose.: 135 mg/dL (06-28-22 @ 21:13)  POCT Blood Glucose.: 122 mg/dL (06-28-22 @ 20:07)  POCT Blood Glucose.: 164 mg/dL (06-28-22 @ 18:52)  POCT Blood Glucose.: 145 mg/dL (06-28-22 @ 17:58)  POCT Blood Glucose.: 175 mg/dL (06-28-22 @ 16:49)  POCT Blood Glucose.: 181 mg/dL (06-28-22 @ 16:06)  POCT Blood Glucose.: 183 mg/dL (06-28-22 @ 15:04)  POCT Blood Glucose.: 199 mg/dL (06-28-22 @ 13:49)  POCT Blood Glucose.: 191 mg/dL (06-28-22 @ 13:14)  POCT Blood Glucose.: 204 mg/dL (06-28-22 @ 11:58)  POCT Blood Glucose.: 194 mg/dL (06-28-22 @ 11:24)  POCT Blood Glucose.: 126 mg/dL (06-28-22 @ 10:03)  POCT Blood Glucose.: 115 mg/dL (06-28-22 @ 08:53)  POCT Blood Glucose.: 115 mg/dL (06-28-22 @ 06:46)  POCT Blood Glucose.: 86 mg/dL (06-28-22 @ 06:03)  POCT Blood Glucose.: 107 mg/dL (06-28-22 @ 05:19)  POCT Blood Glucose.: 120 mg/dL (06-28-22 @ 03:46)  POCT Blood Glucose.: 131 mg/dL (06-28-22 @ 02:53)  POCT Blood Glucose.: 111 mg/dL (06-28-22 @ 01:54)  POCT Blood Glucose.: 137 mg/dL (06-28-22 @ 01:02)  POCT Blood Glucose.: 161 mg/dL (06-27-22 @ 22:52)  POCT Blood Glucose.: 183 mg/dL (06-27-22 @ 22:07)  POCT Blood Glucose.: 183 mg/dL (06-27-22 @ 21:15)  POCT Blood Glucose.: 212 mg/dL (06-27-22 @ 20:09)  POCT Blood Glucose.: 99 mg/dL (06-27-22 @ 18:49)  POCT Blood Glucose.: 152 mg/dL (06-27-22 @ 17:54)  POCT Blood Glucose.: 159 mg/dL (06-27-22 @ 17:18)  POCT Blood Glucose.: 149 mg/dL (06-27-22 @ 15:56)  POCT Blood Glucose.: 185 mg/dL (06-27-22 @ 14:54)  POCT Blood Glucose.: 205 mg/dL (06-27-22 @ 14:13)                            11.1   16.04 )-----------( 318      ( 30 Jun 2022 07:32 )             33.2       06-30    134<L>  |  99  |  46<H>  ----------------------------<  143<H>  4.9   |  25  |  1.46<H>    eGFR: 53<L>    Ca    9.6      06-30  Mg     1.9     06-29  Phos  2.3     06-29    TPro  6.4  /  Alb  4.3  /  TBili  1.0  /  DBili  x   /  AST  14  /  ALT  24  /  AlkPhos  48  06-30      Thyroid Function Tests:  06-22 @ 21:13 TSH 0.57 FreeT4 -- T3 -- Anti TPO -- Anti Thyroglobulin Ab -- TSI --      05-20 Chol 159 Direct LDL -- LDL calculated 84 HDL 29<L> Trig 231<H>, 04-18 Chol 128 Direct LDL -- LDL calculated 41 HDL 34<L> Trig 267<H>, 04-18 Chol 142 Direct LDL -- LDL calculated 36 HDL 37<L> Trig 347<H>    Radiology:

## 2022-06-30 NOTE — PROGRESS NOTE ADULT - ASSESSMENT
66M Mixed Ischemic/NICM Cardiomyopathy (EF 25-30% at baseline, s/p BIV-ICD), CAD (medically managed MIs in ,, Most recent stent in 2022 to mLAD) presented with multiple near syncope, found to have 41 episodes of VT and admitted initially to cardiac telemetry for further evaluation/management. Ischemic evaluation without new disease and VT ablation without substrate to complete ablation. Transferred from [facility] to [facility] for further management and evaluation for LVAD vs OHT. CT surgery consulted for LVAD/transplant evaluation.   Currently undergoing LVAD/transplant eval  Care per Heart failure and CCU primary team  Preop work up and diagnostics in progress  22 s/pOrthotopic heart transplant, ICD removal, iabp  Post op inotropes /pressors, nitric oxide  /Primacor/insulin  extubated d 1, hi flow   iabp d/c    nitric d/c   s/p biopsy, pw d/c,echo neg effusion  Htn-hydralazine, procardia added increased   JAGRUTI-stable, renal transplant following  hyponatremia, lasix/albumin  L sc thrombus,R cephalic thrombus on heparin sq---> plan to check LE duplex and if + may need full anticoagulation  Insulin infusion remains on  On vanco and cefepime for icd pocket + staph epi and morganella   primacor d/c   Transferred to sdu  As per renal lasix increased 40po bid  Chest tube # 3 d/c      66M Mixed Ischemic/NICM Cardiomyopathy (EF 25-30% at baseline, s/p BIV-ICD), CAD (medically managed MIs in ,, Most recent stent in 2022 to mLAD) presented with multiple near syncope, found to have 41 episodes of VT and admitted initially to cardiac telemetry for further evaluation/management. Ischemic evaluation without new disease and VT ablation without substrate to complete ablation. Transferred from [facility] to [facility] for further management and evaluation for LVAD vs OHT. CT surgery consulted for LVAD/transplant evaluation.   Currently undergoing LVAD/transplant eval  Care per Heart failure and CCU primary team  Preop work up and diagnostics in progress  22 s/pOrthotopic heart transplant, ICD removal, iabp  Post op inotropes /pressors, nitric oxide  /Primacor/insulin  extubated d 1, hi flow   iabp d/c    nitric d/c   s/p biopsy, pw d/c,echo neg effusion  Htn-hydralazine, procardia added increased   JAGRUTI-stable, renal transplant following  hyponatremia, lasix/albumin  L sc thrombus,R cephalic thrombus on heparin sq---> plan to check LE duplex and if + may need full anticoagulation  Insulin infusion remains on  On vanco and cefepime for icd pocket + staph epi and morganella   primacor d/c   Transferred to sdu  As per renal lasix increased 40po bid  Chest tube # 3 d/c   VSS; insulin gttp weaned off overnight;  RSR 80-90; ?d/c med ct today- d/w Dr. Lombardi; LE sono neg DVT; tacro level today 10- continue tacro 4 po bid; vanco trough level 5.6- vanco 500 mg iv bid; change to po abx at home   Discharge planning- home ? fri; ck covid pcr; next biopsy scheduled

## 2022-06-30 NOTE — PROGRESS NOTE ADULT - SUBJECTIVE AND OBJECTIVE BOX
VITAL SIGNS    Telemetry:    Vital Signs Last 24 Hrs  T(C): 37.2 (22 @ 07:19), Max: 37.2 (22 @ 07:19)  T(F): 99 (22 @ 07:19), Max: 99 (22 @ 07:19)  HR: 92 (22 @ 07:19) (90 - 98)  BP: 122/69 (22 @ 07:19) (108/57 - 129/75)  RR: 18 (22 @ 07:19) (18 - 20)  SpO2: 95% (22 @ 07:19) (92% - 95%)             @ 07:01  -   @ 07:00  --------------------------------------------------------  IN: 1179.4 mL / OUT: 4105 mL / NET: -2925.6 mL     @ 07:01  -   @ 10:33  --------------------------------------------------------  IN: 360 mL / OUT: 0 mL / NET: 360 mL       Daily     Daily Weight in k (2022 08:24)  Admit Wt: Drug Dosing Weight  Height (cm): 177.8 (2022 11:22)  Weight (kg): 72.9 (2022 11:22)  BMI (kg/m2): 23.1 (2022 11:22)  BSA (m2): 1.9 (2022 11:22)    Bilirubin Total, Serum: 1.0 mg/dL ( @ 07:32)    CAPILLARY BLOOD GLUCOSE      POCT Blood Glucose.: 136 mg/dL (2022 08:10)  POCT Blood Glucose.: 181 mg/dL (2022 22:24)  POCT Blood Glucose.: 146 mg/dL (2022 19:25)  POCT Blood Glucose.: 98 mg/dL (2022 17:17)  POCT Blood Glucose.: 129 mg/dL (2022 16:04)  POCT Blood Glucose.: 123 mg/dL (2022 15:08)  POCT Blood Glucose.: 121 mg/dL (2022 13:52)  POCT Blood Glucose.: 125 mg/dL (2022 12:50)  POCT Blood Glucose.: 122 mg/dL (2022 11:38)          atovaquone  Suspension 1500 milliGRAM(s) Oral daily  cefepime   IVPB 1000 milliGRAM(s) IV Intermittent every 8 hours  chlorhexidine 2% Cloths 1 Application(s) Topical <User Schedule>  furosemide    Tablet 40 milliGRAM(s) Oral two times a day  heparin   Injectable 5000 Unit(s) SubCutaneous every 8 hours  hydrALAZINE 25 milliGRAM(s) Oral every 8 hours  insulin lispro (ADMELOG) corrective regimen sliding scale   SubCutaneous three times a day before meals  insulin lispro (ADMELOG) corrective regimen sliding scale   SubCutaneous at bedtime  insulin regular Infusion 3 Unit(s)/Hr IV Continuous <Continuous>  methylPREDNISolone sodium succinate Injectable 20 milliGRAM(s) IV Push every 12 hours  multivitamin 1 Tablet(s) Oral daily  mycophenolate mofetil 1000 milliGRAM(s) Oral every 12 hours  NIFEdipine XL 60 milliGRAM(s) Oral daily  nystatin    Suspension 393880 Unit(s) Oral <User Schedule>  pantoprazole    Tablet 40 milliGRAM(s) Oral before breakfast  polyethylene glycol 3350 17 Gram(s) Oral daily  tacrolimus 4 milliGRAM(s) Oral <User Schedule>  traMADol 25 milliGRAM(s) Oral every 8 hours PRN  valGANciclovir 450 milliGRAM(s) Oral <User Schedule>  vancomycin    Solution 125 milliGRAM(s) Oral every 12 hours  vancomycin  IVPB 500 milliGRAM(s) IV Intermittent <User Schedule>      PHYSICAL EXAM    Subjective: "Hi.   Neurology: alert and oriented x 3, nonfocal, no gross deficits  CV : tele:  RSR  Sternal Wound :  CDI with dressing , Stable  Lungs: clear. RR easy, unlabored   Abdomen: soft, nontender, nondistended, positive bowel sounds, bowel movement   Neg N/V/D   :  pt voiding without difficulty   Extremities:   BRADFORD; edema, neg calf tenderness.   PPP bilaterally      PW:  Chest tubes:                 VITAL SIGNS    Telemetry:  rsr 80-90   Vital Signs Last 24 Hrs  T(C): 37.2 (22 @ 07:19), Max: 37.2 (22 @ 07:19)  T(F): 99 (22 @ 07:19), Max: 99 (22 @ 07:19)  HR: 92 (22 @ 07:19) (90 - 98)  BP: 122/69 (22 @ 07:19) (108/57 - 129/75)  RR: 18 (22 @ 07:19) (18 - 20)  SpO2: 95% (22 @ 07:19) (92% - 95%)             @ 07:01  -   @ 07:00  --------------------------------------------------------  IN: 1179.4 mL / OUT: 4105 mL / NET: -2925.6 mL     @ 07:01  -   @ 10:33  --------------------------------------------------------  IN: 360 mL / OUT: 0 mL / NET: 360 mL       Daily     Daily Weight in k (2022 08:24)  Admit Wt: Drug Dosing Weight  Height (cm): 177.8 (2022 11:22)  Weight (kg): 72.9 (2022 11:22)  BMI (kg/m2): 23.1 (2022 11:22)  BSA (m2): 1.9 (2022 11:22)    Bilirubin Total, Serum: 1.0 mg/dL ( @ 07:32)    CAPILLARY BLOOD GLUCOSE      POCT Blood Glucose.: 136 mg/dL (2022 08:10)  POCT Blood Glucose.: 181 mg/dL (2022 22:24)  POCT Blood Glucose.: 146 mg/dL (2022 19:25)  POCT Blood Glucose.: 98 mg/dL (2022 17:17)  POCT Blood Glucose.: 129 mg/dL (2022 16:04)  POCT Blood Glucose.: 123 mg/dL (2022 15:08)  POCT Blood Glucose.: 121 mg/dL (2022 13:52)  POCT Blood Glucose.: 125 mg/dL (2022 12:50)  POCT Blood Glucose.: 122 mg/dL (2022 11:38)          atovaquone  Suspension 1500 milliGRAM(s) Oral daily  cefepime   IVPB 1000 milliGRAM(s) IV Intermittent every 8 hours  chlorhexidine 2% Cloths 1 Application(s) Topical <User Schedule>  furosemide    Tablet 40 milliGRAM(s) Oral two times a day  heparin   Injectable 5000 Unit(s) SubCutaneous every 8 hours  hydrALAZINE 25 milliGRAM(s) Oral every 8 hours  insulin lispro (ADMELOG) corrective regimen sliding scale   SubCutaneous three times a day before meals  insulin lispro (ADMELOG) corrective regimen sliding scale   SubCutaneous at bedtime  insulin regular Infusion 3 Unit(s)/Hr IV Continuous <Continuous>  methylPREDNISolone sodium succinate Injectable 20 milliGRAM(s) IV Push every 12 hours  multivitamin 1 Tablet(s) Oral daily  mycophenolate mofetil 1000 milliGRAM(s) Oral every 12 hours  NIFEdipine XL 60 milliGRAM(s) Oral daily  nystatin    Suspension 756252 Unit(s) Oral <User Schedule>  pantoprazole    Tablet 40 milliGRAM(s) Oral before breakfast  polyethylene glycol 3350 17 Gram(s) Oral daily  tacrolimus 4 milliGRAM(s) Oral <User Schedule>  traMADol 25 milliGRAM(s) Oral every 8 hours PRN  valGANciclovir 450 milliGRAM(s) Oral <User Schedule>  vancomycin    Solution 125 milliGRAM(s) Oral every 12 hours  vancomycin  IVPB 500 milliGRAM(s) IV Intermittent <User Schedule>      PHYSICAL EXAM    Subjective: "I feel ok."   Neurology: alert and oriented x 3, nonfocal, no gross deficits  CV : tele:  RSR 80-90; + friction rub noted  Sternal Wound :  CDI TRISTEN- sternum stable   Lungs: clear. RR easy, unlabored   Abdomen: soft, nontender, nondistended, positive bowel sounds, + bowel movement   Neg N/V/D   :  pt voiding without difficulty   Extremities:   BRADFORD; trace pedal edema, neg calf tenderness.   PPP bilaterally      PW: none   Chest tubes: 2 mediastinal ct draining serous- sang drainge

## 2022-07-01 LAB
ALBUMIN SERPL ELPH-MCNC: 4.3 G/DL — SIGNIFICANT CHANGE UP (ref 3.3–5)
ALP SERPL-CCNC: 53 U/L — SIGNIFICANT CHANGE UP (ref 40–120)
ALT FLD-CCNC: 30 U/L — SIGNIFICANT CHANGE UP (ref 10–45)
ANION GAP SERPL CALC-SCNC: 14 MMOL/L — SIGNIFICANT CHANGE UP (ref 5–17)
AST SERPL-CCNC: 19 U/L — SIGNIFICANT CHANGE UP (ref 10–40)
BASOPHILS # BLD AUTO: 0.02 K/UL — SIGNIFICANT CHANGE UP (ref 0–0.2)
BASOPHILS NFR BLD AUTO: 0.1 % — SIGNIFICANT CHANGE UP (ref 0–2)
BILIRUB SERPL-MCNC: 1.2 MG/DL — SIGNIFICANT CHANGE UP (ref 0.2–1.2)
BUN SERPL-MCNC: 50 MG/DL — HIGH (ref 7–23)
CALCIUM SERPL-MCNC: 9.7 MG/DL — SIGNIFICANT CHANGE UP (ref 8.4–10.5)
CHLORIDE SERPL-SCNC: 97 MMOL/L — SIGNIFICANT CHANGE UP (ref 96–108)
CO2 SERPL-SCNC: 23 MMOL/L — SIGNIFICANT CHANGE UP (ref 22–31)
CREAT SERPL-MCNC: 1.46 MG/DL — HIGH (ref 0.5–1.3)
EGFR: 53 ML/MIN/1.73M2 — LOW
EOSINOPHIL # BLD AUTO: 0 K/UL — SIGNIFICANT CHANGE UP (ref 0–0.5)
EOSINOPHIL NFR BLD AUTO: 0 % — SIGNIFICANT CHANGE UP (ref 0–6)
GLUCOSE BLDC GLUCOMTR-MCNC: 129 MG/DL — HIGH (ref 70–99)
GLUCOSE BLDC GLUCOMTR-MCNC: 147 MG/DL — HIGH (ref 70–99)
GLUCOSE BLDC GLUCOMTR-MCNC: 178 MG/DL — HIGH (ref 70–99)
GLUCOSE BLDC GLUCOMTR-MCNC: 258 MG/DL — HIGH (ref 70–99)
GLUCOSE SERPL-MCNC: 158 MG/DL — HIGH (ref 70–99)
HCT VFR BLD CALC: 34.8 % — LOW (ref 39–50)
HGB BLD-MCNC: 11.7 G/DL — LOW (ref 13–17)
IMM GRANULOCYTES NFR BLD AUTO: 3 % — HIGH (ref 0–1.5)
LYMPHOCYTES # BLD AUTO: 1.84 K/UL — SIGNIFICANT CHANGE UP (ref 1–3.3)
LYMPHOCYTES # BLD AUTO: 11.9 % — LOW (ref 13–44)
MCHC RBC-ENTMCNC: 29.5 PG — SIGNIFICANT CHANGE UP (ref 27–34)
MCHC RBC-ENTMCNC: 33.6 GM/DL — SIGNIFICANT CHANGE UP (ref 32–36)
MCV RBC AUTO: 87.7 FL — SIGNIFICANT CHANGE UP (ref 80–100)
MONOCYTES # BLD AUTO: 1 K/UL — HIGH (ref 0–0.9)
MONOCYTES NFR BLD AUTO: 6.5 % — SIGNIFICANT CHANGE UP (ref 2–14)
NEUTROPHILS # BLD AUTO: 12.17 K/UL — HIGH (ref 1.8–7.4)
NEUTROPHILS NFR BLD AUTO: 78.5 % — HIGH (ref 43–77)
NRBC # BLD: 0 /100 WBCS — SIGNIFICANT CHANGE UP (ref 0–0)
PLATELET # BLD AUTO: 339 K/UL — SIGNIFICANT CHANGE UP (ref 150–400)
POTASSIUM SERPL-MCNC: 4.7 MMOL/L — SIGNIFICANT CHANGE UP (ref 3.5–5.3)
POTASSIUM SERPL-SCNC: 4.7 MMOL/L — SIGNIFICANT CHANGE UP (ref 3.5–5.3)
PROT SERPL-MCNC: 6.6 G/DL — SIGNIFICANT CHANGE UP (ref 6–8.3)
RBC # BLD: 3.97 M/UL — LOW (ref 4.2–5.8)
RBC # FLD: 18.5 % — HIGH (ref 10.3–14.5)
SODIUM SERPL-SCNC: 134 MMOL/L — LOW (ref 135–145)
TACROLIMUS SERPL-MCNC: 16.3 NG/ML — SIGNIFICANT CHANGE UP
VANCOMYCIN TROUGH SERPL-MCNC: 8.8 UG/ML — LOW (ref 10–20)
WBC # BLD: 15.49 K/UL — HIGH (ref 3.8–10.5)
WBC # FLD AUTO: 15.49 K/UL — HIGH (ref 3.8–10.5)

## 2022-07-01 PROCEDURE — 99232 SBSQ HOSP IP/OBS MODERATE 35: CPT | Mod: 24

## 2022-07-01 PROCEDURE — 99233 SBSQ HOSP IP/OBS HIGH 50: CPT | Mod: GC

## 2022-07-01 PROCEDURE — 99232 SBSQ HOSP IP/OBS MODERATE 35: CPT

## 2022-07-01 PROCEDURE — 71045 X-RAY EXAM CHEST 1 VIEW: CPT | Mod: 26,76

## 2022-07-01 PROCEDURE — 99233 SBSQ HOSP IP/OBS HIGH 50: CPT

## 2022-07-01 RX ORDER — TACROLIMUS 5 MG/1
3 CAPSULE ORAL EVERY 12 HOURS
Refills: 0 | Status: DISCONTINUED | OUTPATIENT
Start: 2022-07-01 | End: 2022-07-06

## 2022-07-01 RX ORDER — VALGANCICLOVIR 450 MG/1
450 TABLET, FILM COATED ORAL EVERY 12 HOURS
Refills: 0 | Status: DISCONTINUED | OUTPATIENT
Start: 2022-07-01 | End: 2022-07-08

## 2022-07-01 RX ADMIN — CEFEPIME 100 MILLIGRAM(S): 1 INJECTION, POWDER, FOR SOLUTION INTRAMUSCULAR; INTRAVENOUS at 21:44

## 2022-07-01 RX ADMIN — Medication 60 MILLIGRAM(S): at 06:25

## 2022-07-01 RX ADMIN — Medication 20 MILLIGRAM(S): at 20:03

## 2022-07-01 RX ADMIN — VALGANCICLOVIR 450 MILLIGRAM(S): 450 TABLET, FILM COATED ORAL at 20:03

## 2022-07-01 RX ADMIN — ATOVAQUONE 1500 MILLIGRAM(S): 750 SUSPENSION ORAL at 13:28

## 2022-07-01 RX ADMIN — Medication 125 MILLIGRAM(S): at 07:59

## 2022-07-01 RX ADMIN — HEPARIN SODIUM 5000 UNIT(S): 5000 INJECTION INTRAVENOUS; SUBCUTANEOUS at 00:16

## 2022-07-01 RX ADMIN — Medication 3: at 18:02

## 2022-07-01 RX ADMIN — INSULIN GLARGINE 12 UNIT(S): 100 INJECTION, SOLUTION SUBCUTANEOUS at 21:42

## 2022-07-01 RX ADMIN — Medication 500000 UNIT(S): at 16:24

## 2022-07-01 RX ADMIN — CEFEPIME 100 MILLIGRAM(S): 1 INJECTION, POWDER, FOR SOLUTION INTRAMUSCULAR; INTRAVENOUS at 06:24

## 2022-07-01 RX ADMIN — Medication 10 MILLIGRAM(S): at 06:56

## 2022-07-01 RX ADMIN — Medication 1 TABLET(S): at 13:28

## 2022-07-01 RX ADMIN — Medication 100 MILLIGRAM(S): at 08:03

## 2022-07-01 RX ADMIN — CHLORHEXIDINE GLUCONATE 1 APPLICATION(S): 213 SOLUTION TOPICAL at 06:24

## 2022-07-01 RX ADMIN — Medication 500000 UNIT(S): at 20:02

## 2022-07-01 RX ADMIN — TACROLIMUS 4 MILLIGRAM(S): 5 CAPSULE ORAL at 08:00

## 2022-07-01 RX ADMIN — HEPARIN SODIUM 5000 UNIT(S): 5000 INJECTION INTRAVENOUS; SUBCUTANEOUS at 16:25

## 2022-07-01 RX ADMIN — POLYETHYLENE GLYCOL 3350 17 GRAM(S): 17 POWDER, FOR SOLUTION ORAL at 13:30

## 2022-07-01 RX ADMIN — VALGANCICLOVIR 450 MILLIGRAM(S): 450 TABLET, FILM COATED ORAL at 08:00

## 2022-07-01 RX ADMIN — Medication 20 MILLIGRAM(S): at 07:59

## 2022-07-01 RX ADMIN — Medication 100 MILLIGRAM(S): at 20:01

## 2022-07-01 RX ADMIN — Medication 125 MILLIGRAM(S): at 20:02

## 2022-07-01 RX ADMIN — Medication 500000 UNIT(S): at 07:59

## 2022-07-01 RX ADMIN — Medication 500000 UNIT(S): at 13:28

## 2022-07-01 RX ADMIN — MYCOPHENOLATE MOFETIL 1000 MILLIGRAM(S): 250 CAPSULE ORAL at 20:03

## 2022-07-01 RX ADMIN — CEFEPIME 100 MILLIGRAM(S): 1 INJECTION, POWDER, FOR SOLUTION INTRAMUSCULAR; INTRAVENOUS at 13:31

## 2022-07-01 RX ADMIN — TACROLIMUS 3 MILLIGRAM(S): 5 CAPSULE ORAL at 20:03

## 2022-07-01 RX ADMIN — Medication 40 MILLIGRAM(S): at 13:29

## 2022-07-01 RX ADMIN — MYCOPHENOLATE MOFETIL 1000 MILLIGRAM(S): 250 CAPSULE ORAL at 07:59

## 2022-07-01 RX ADMIN — HEPARIN SODIUM 5000 UNIT(S): 5000 INJECTION INTRAVENOUS; SUBCUTANEOUS at 07:58

## 2022-07-01 RX ADMIN — Medication 25 MILLIGRAM(S): at 06:25

## 2022-07-01 RX ADMIN — Medication 10 MILLIGRAM(S): at 21:45

## 2022-07-01 RX ADMIN — PANTOPRAZOLE SODIUM 40 MILLIGRAM(S): 20 TABLET, DELAYED RELEASE ORAL at 06:25

## 2022-07-01 RX ADMIN — Medication 10 MILLIGRAM(S): at 13:29

## 2022-07-01 RX ADMIN — Medication 40 MILLIGRAM(S): at 06:24

## 2022-07-01 NOTE — PROGRESS NOTE ADULT - ASSESSMENT
67 YO M with a history of ACC/AHA Stage D mixed NICM/ICM (likely familial with strong FH and early arrhythmia history in his 30's) with LVED 5.2 cm and LVEF 10-15% s/p PPM upgraded to CRT-D, CAD s/p PCI to mLAD 4/2022, well controlled DM2 (A1c 6.2%), and CKD III (Cr 1.4) who initially presented to [facility] 5/20 with near syncope in setting of worsening HF symptoms and found to have 41 episodes of VT, many terminating with ATP. LHC did not reveal new obstructive CAD and he underwent EPS which did not reveal endocardial substrate amenable for ablation. RHC revealed severely depressed cardiac output and he was transferred to [facility] 5/26 for advanced therapies evaluation.    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 He improved significantly with sequential uptitration of inotropes and increased pacing rate, but on 6/6 he had worsening JAGRUTI. He is s/p RHC which revealed severely elevated filling pressures, severe cpcPH, and CI of 1.9 on milrinone 0.5. IABP was placed and his renal function/PAPs have improved. He is MCS dependent and was inadequately supported with high dose inotropes, so was listed UNOS status 2e for OHT, awaiting suitable donor. However, was made status 7 as he became febrile, last 6/7 T 38. He has been afebrile since and blood cultures from 6/7 with NGTD x 48 hours and his leukocytosis has not worsened. Discussed with transplant ID and since bld cx negative x48hrs he has been re-listed UNOS status 2e.     A suitable donor was identified and patient underwent OHT with Dr. Earl/Maria C on 6/21 (ischemic time 261 mins, CMV +/+, Toxo -/-). Patient was successfully extubated and IABP was removed on POD 1. Off Maricruz. He's overall progressing well. His volume status is overall improving though his renal function has taken a slight hit. Cultures from his ICD site in the OR are returning positive for staph epidermidis/ morganella morganii, remains on IV abx. His most recent RHC showed elevated PA pressures and slightly elevated filling pressures, will continue to optimize his medications. He is nearing readiness for discharge pending removal of his CT.      RHC/EMBx  6/28: RA 9, RV 3/11, PA 62/27/41, wedge 21 with v wave 31, PA sat 69.3%, /81/102, CO/Ci 6.87/3.62, grade 1R/2      Review of studies  TTE 5/21: LV 5.2 cm, LVEF 10-15%, LVOT VTI 10 cm, moderate RV dysfunction, mild AI, minimal MR  EKG: a-BiV paced  LHC 5/23: patent mLAD stent with slow flow, D1 with 40-50% stenosis, mild disease otherwise  RHC 5/26: RA 12, PA 70/40 (50), PCWP 22, Milana CO/CI 2.3/1.3, MAP 83 with SVR 2469, PVR 12 PERSAUD         Problem/Plan - 1:  ·  Problem: S/P orthotopic heart transplant.   ·  Plan: - s/p OHT with Dr. Earl/Maria C on 6/21 (ischemic time 261 min)  - IABP removed on POD 1  - sildenafil 10 mg PO TID   - continue Lasix 40 mg PO BID   - Increase procardia to 90mg and d/c hydralazine   - next RHC/EMBx schedule for Tuesday 7/5.      Problem/Plan - 2:  ·  Problem: Immunosuppression.   ·  Plan: - will continue to adjust tacro as needed for goal 12-14  - Prednisone taper   - remains on cellcept 1000 mg PO BID.     Problem/Plan - 3:  ·  Problem: Prophylactic antibiotic.   ·  Plan: - CMV +/+, on Valcyte 450 mg PO BID   - Toxo -/-, no ppx needed, remains on Mepron for PJP ppx  - periop antibiotics per CTS.     Problem/Plan - 4:  ·  Problem: JAGRUTI (acute kidney injury).   ·  Plan: - renal function is overall improving  - diuresis as needed as stated above  - cardiorenal consulted, appreciate recommendations.     Problem/Plan - 5:  ·  Problem: Gout.   ·  Plan: - will resume allopurinol when feasible   - s/p course of prednisone.     Problem/Plan - 6:  ·  Problem: Drop in hemoglobin.   ·  Plan: - currently improving  - no signs of active bleeding  - continue to trend daily.     Problem/Plan - 7:  ·  Problem: Leukocytosis.   ·  Plan: - postop course c/b leukocytosis, overall improving   - ICD pocket culture positive for staph epidermidis/ morganella morganii  - remains on Cefepime and Vanco IV. If patient is discharged home tomorrow will be discharged home on oral abx   - ID following.

## 2022-07-01 NOTE — PROGRESS NOTE ADULT - PROBLEM SELECTOR PLAN 1
Pt. with hemodynamically mediated JAGRUTI after OHT, in the setting of renal venous congestion and medication induced (tacro, Vanco) Upon review of labs, Scr was 1.2 on 4/19/22 and was elevated at 1.8 on 5/24/22. SCr downtrended to 1.01 on 6/20/22. Pt underwent OHT on 6/21/22 and required IABP post operatively.  UA done on 6/8/22 showed trace blood and proteinuria. Scr uptrended to 1.23 on 6/23/22 with elevated PA pressures. SCr increased to 2.32. Pt also noted to have increased in tacro dose and vancomycin levels were on higher side. Vanco and diuretics were held on 6/27/22. SCr improved to 1.4 today. Now on lasix 40 gm BID.  Repeat UA and spot urine TP/Cr.    Pt is nonoliguric with 2.8L as documented. Continue diuretics and titrate to keep CVP < 10. Labs reviewed. No urgent indication for RRT. Monitor labs and urine output. Pt. with hemodynamically mediated JAGRUTI after OHT, in the setting of renal venous congestion and medication induced (tacro, Vanco) Upon review of labs, Scr was 1.2 on 4/19/22 and was elevated at 1.8 on 5/24/22. SCr downtrended to 1.01 on 6/20/22. Pt underwent OHT on 6/21/22 and required IABP post operatively.  UA done on 6/8/22 showed trace blood and proteinuria. Scr uptrended to 1.23 on 6/23/22 with elevated PA pressures. SCr increased to 2.32. Pt also noted to have increased in tacro dose and vancomycin levels were on higher side. Vanco and diuretics were held on 6/27/22. SCr improved to 1.4 today. Now on lasix 40 gm BID.  Repeat UA and spot urine TP/Cr.    Pt is nonoliguric with 2.8L as documented. Continue diuretics and titrate to keep CVP < 10. Labs reviewed. No urgent indication for RRT. Monitor labs and urine output.    Pt. will need follow up with nephrology as outpatient. Can follow up with Dr. Jaramillo in 4 weeks after discharge from the [facility].

## 2022-07-01 NOTE — CHART NOTE - NSCHARTNOTEFT_GEN_A_CORE
Nutrition Follow Up Note  Patient seen for: Nutrition follow-up & nutrition consult warranted for "dm2, s/p recent transplant"    Chart reviewed, events noted. Per chart: 66M, PMH of ACC/AHA Stage D mixed NICM/ICM, DM2, CKD III. Admitted with cardiogenic shock and many episodes of VT. Underwent evaluation for advanced therapies. IABP placed 2022 and was listed for heart transplant UNOS status 2E. Now s/p OHT  after suitable donor identified; IABP removed POD1.    Source: EMR, Team,    Diet, Regular:   Consistent Carbohydrate {No Snacks} (CSTCHO)  Supplement Feeding Modality:  Oral  Glucerna Shake Cans or Servings Per Day:  1       Frequency:  Two Times a day (22 @ 13:55) [Active]    Is current diet order appropriate/adequate? [x] Yes  []  No:       Nutrition-Related Events:  - K+ 4.9   - Ordered for Steroid taper, Cellcept, & Tacro s/p OHT; monitor BG & K+ levels, respectively   - Multivitamin supplementation ordered daily  - 12u Lantus HS + Insulin Sliding Scale ordered to optimize BG  - Ongoing diet education in setting of AT evaluation    GI:  Last BM  . Bowel Regimen? [x] Yes (Miralax)  - Currently on abx course  - Chest Tubes in place, output as follows:     Meds2: 20ml ( - thus far), 90ml ()     Meds1: 10ml ( - thus far), 80ml ()    Weights in k (), 74 (), 79.4 (), 78.5 (), 77 (), 82 (), 75.1 (), 70.6 (-), 71.6 (06-15), 72.1 (06-10), 79.8 ()  - Weight fluctuations noted in-house, likely 2/2 fluid shifts as pt received diuretic therapies and currently with postoperative edema. Will continue to monitor/trend weight status.     Admit Dosing Weight: 162 Ibs/73.5 kg ()  Height: 5'10" (177.8 cm)  BMI (kg/m2): 23.3 ()  IBW: 166 Ibs/75.3 kg  UBW PTA: ~168 lbs/76.2 kg  Pre-Transplant Weight: 155.6 Ibs/70.6 kg ()    MEDICATIONS  (STANDING):  atovaquone  Suspension  cefepime   IVPB  furosemide    Tablet  hydrALAZINE  insulin glargine Injectable (LANTUS)  insulin lispro (ADMELOG) corrective regimen sliding scale  insulin lispro (ADMELOG) corrective regimen sliding scale  multivitamin  NIFEdipine XL  nystatin    Suspension  pantoprazole    Tablet  polyethylene glycol 3350  predniSONE   Tablet  sildenafil (REVATIO)  valGANciclovir  vancomycin    Solution  vancomycin  IVPB    Pertinent Labs:   A1C with Estimated Average Glucose Result: 6.2 % (22 @ 14:17)  A1C with Estimated Average Glucose Result: 6.9 % (22 @ 15:40)  A1C with Estimated Average Glucose Result: 6.7 % (22 @ 15:05)    Finger Sticks:  POCT Blood Glucose.: 147 mg/dL ( @ 07:20)  POCT Blood Glucose.: 222 mg/dL ( @ 20:49)  POCT Blood Glucose.: 238 mg/dL ( @ 16:40)  POCT Blood Glucose.: 202 mg/dL ( @ 11:37)    Skin per nursing documentation: + midsternal surgical incision; no pressure injuries noted  Edema: 2+ B/L legs    Estimated Nutritional Needs  Recalculated based on Pre-Transplant Weight: 155.6 Ibs/70.6 kg ()  Energy Needs (30-35 kcals/kg): 2526-0563  Protein Needs (1.3-1.6): 91.8-113    Previous Nutrition Diagnosis: Increased Nutrient Needs; Food & Nutrition Related Knowledge Deficit  Nutrition Diagnosis is: [x] ongoing  - addressed with PO diet, micronutrient and oral nutrition supplementation; ongoing diet education for LVAD/heart transplant evaluation    New Nutrition Diagnosis: N/a     Nutrition Care Plan:  [x] In Progress  [] Achieved  [] Not applicable    Recommendations:          Monitoring and Evaluation:   Continue to monitor nutritional intake, tolerance to diet prescription, weights, labs, skin integrity  RD remains available upon request and will follow up per protocol    Sallie Serna, MS, RD, CDN, [facility]  pager #[phone] Nutrition Follow Up Note  Patient seen for: Nutrition follow-up & nutrition consult warranted for "dm2, s/p recent transplant"    Chart reviewed, events noted. Per chart: 66M, PMH of ACC/AHA Stage D mixed NICM/ICM, DM2, CKD III. Admitted with cardiogenic shock and many episodes of VT. Underwent evaluation for advanced therapies. IABP placed 2022 and was listed for heart transplant UNOS status 2E. Now s/p OHT  after suitable donor identified; IABP removed POD1.    Source: EMR, Team, Pt, Pt's wife    Diet, Regular:   Consistent Carbohydrate {No Snacks} (CSTCHO)  Supplement Feeding Modality:  Oral  Glucerna Shake Cans or Servings Per Day:  1       Frequency:  Two Times a day (22 @ 13:55) [Active]    Is current diet order appropriate/adequate? [x] Yes  []  No:     PO Intake: >75%    Pt visited, resting in bed with wife at bedside. RD Reviewed food safety rules, storage/cooking temps, expiration dates, avoids raw/undercooked meats/fish and avoiding buffets and tips for dinning out. Discussed S&S of food borne illness.  Reviewed the importance of BG control while on prednisone (consistent CHO diet education - foods containing CHO, portion sizes of CHO, pairing CHO with proteins, reading food labels, monitoring blood glucose levels, not skipping meals, S&S and treatment of hypoglycemia). Reviewed importance of a low K+ diet and reviewed foods high in K+ to be mindful of. Reviewed importance of avoiding grapefruit, pomegranate and sour oranges. Pt & Pt's wife verbalized understanding, used teach back points and accepted written education materials.     Nutrition-Related Events:  - K+ 4.9   - Ordered for Steroid taper, Cellcept, & Tacro s/p OHT; monitor BG & K+ levels, respectively   - Multivitamin supplementation ordered daily  - 12u Lantus HS + Insulin Sliding Scale ordered to optimize BG  - Ongoing diet education in setting of AT evaluation    GI:  Last BM  . Bowel Regimen? [x] Yes (Miralax)  - Currently on abx course  - Chest Tubes in place, output as follows:     Meds2: 20ml ( - thus far), 90ml ()     Meds1: 10ml ( - thus far), 80ml ()    Weights in k (), 74 (), 79.4 (), 78.5 (), 77 (), 82 (), 75.1 (), 70.6 (), 71.6 (06-15), 72.1 (06-10), 79.8 ()  - Weight fluctuations noted in-house, likely 2/2 fluid shifts as pt received diuretic therapies and currently with postoperative edema. Will continue to monitor/trend weight status.     Admit Dosing Weight: 162 Ibs/73.5 kg ()  Height: 5'10" (177.8 cm)  BMI (kg/m2): 23.3 ()  IBW: 166 Ibs/75.3 kg  UBW PTA: ~168 lbs/76.2 kg  Pre-Transplant Weight: 155.6 Ibs/70.6 kg ()    MEDICATIONS  (STANDING):  atovaquone  Suspension  cefepime   IVPB  furosemide    Tablet  hydrALAZINE  insulin glargine Injectable (LANTUS)  insulin lispro (ADMELOG) corrective regimen sliding scale  insulin lispro (ADMELOG) corrective regimen sliding scale  multivitamin  NIFEdipine XL  nystatin    Suspension  pantoprazole    Tablet  polyethylene glycol 3350  predniSONE   Tablet  sildenafil (REVATIO)  valGANciclovir  vancomycin    Solution  vancomycin  IVPB    Pertinent Labs:   A1C with Estimated Average Glucose Result: 6.2 % (22 @ 14:17)  A1C with Estimated Average Glucose Result: 6.9 % (22 @ 15:40)  A1C with Estimated Average Glucose Result: 6.7 % (22 @ 15:05)    Finger Sticks:  POCT Blood Glucose.: 147 mg/dL ( @ 07:20)  POCT Blood Glucose.: 222 mg/dL ( @ 20:49)  POCT Blood Glucose.: 238 mg/dL ( @ 16:40)  POCT Blood Glucose.: 202 mg/dL ( @ 11:37)    Skin per nursing documentation: + midsternal surgical incision; no pressure injuries noted  Edema: 2+ B/L legs    Estimated Nutritional Needs  Recalculated based on Pre-Transplant Weight: 155.6 Ibs/70.6 kg ()  Energy Needs (30-35 kcals/kg): 7149-8669  Protein Needs (1.3-1.6): 91.8-113    Previous Nutrition Diagnosis: Increased Nutrient Needs; Food & Nutrition Related Knowledge Deficit  Nutrition Diagnosis is: [x] ongoing  - addressed with PO diet, micronutrient and oral nutrition supplementation; ongoing diet education for LVAD/heart transplant evaluation    New Nutrition Diagnosis: N/a     Nutrition Care Plan:  [x] In Progress  [] Achieved  [] Not applicable    Recommendations:      1. Recommend Consistent CHO, Low K+ diet, continue Glucerna Shake BID   2. Continue micronutrient supplementation as ordered  3. Diet education ongoing; RD remains available     Monitoring and Evaluation:   Continue to monitor nutritional intake, tolerance to diet prescription, weights, labs, skin integrity  RD remains available upon request and will follow up per protocol    Sallie Serna, MS, RD, CDN, CNSC  pager #[phone]

## 2022-07-01 NOTE — PROGRESS NOTE ADULT - SUBJECTIVE AND OBJECTIVE BOX
VITAL SIGNS    Telemetry:      Vital Signs Last 24 Hrs  T(C): 36.7 (22 @ 07:16), Max: 36.9 (22 @ 22:38)  T(F): 98 (22 @ 07:16), Max: 98.4 (22 @ 22:38)  HR: 90 (22 @ 07:16) (90 - 98)  BP: 127/63 (22 @ 07:16) (105/61 - 130/74)  RR: 18 (22 @ 07:16) (18 - 18)  SpO2: 95% (22 @ 07:16) (94% - 96%)                    @ 07:01  -   @ 07:00  --------------------------------------------------------  IN: 1110 mL / OUT: 2845 mL / NET: -1735 mL          Daily     Daily Weight in k (2022 07:16)            CAPILLARY BLOOD GLUCOSE      POCT Blood Glucose.: 147 mg/dL (2022 07:20)  POCT Blood Glucose.: 222 mg/dL (2022 20:49)  POCT Blood Glucose.: 238 mg/dL (2022 16:40)  POCT Blood Glucose.: 202 mg/dL (2022 11:37)            Drains:     MS         [  ] Drainage:                 L Pleural  [  ]  Drainage:                R Pleural  [  ]  Drainage:    Pacing Wires        [  ]   Settings:                                  Isolated  [  ]    Coumadin    [ ] YES          [  ]      NO                                   PHYSICAL EXAM        Neurology: alert and oriented x 3, nonfocal, no gross deficits  CV : s1 s2 RRR  Sternal Wound :  CDI , Stable  Lungs: cta  Abdomen: soft, nontender, nondistended, positive bowel sounds, last bowel movement                       chest tubes  :    voiding / block - sbd         Extremities:      edema   /  -   calve tenderness ,    L leg  /  R leg  incisions cdi          atovaquone  Suspension 1500 milliGRAM(s) Oral daily  cefepime   IVPB 1000 milliGRAM(s) IV Intermittent every 8 hours  chlorhexidine 2% Cloths 1 Application(s) Topical <User Schedule>  furosemide    Tablet 40 milliGRAM(s) Oral two times a day  heparin   Injectable 5000 Unit(s) SubCutaneous every 8 hours  hydrALAZINE 25 milliGRAM(s) Oral every 8 hours  insulin glargine Injectable (LANTUS) 12 Unit(s) SubCutaneous at bedtime  insulin lispro (ADMELOG) corrective regimen sliding scale   SubCutaneous three times a day before meals  insulin lispro (ADMELOG) corrective regimen sliding scale   SubCutaneous at bedtime  multivitamin 1 Tablet(s) Oral daily  mycophenolate mofetil 1000 milliGRAM(s) Oral every 12 hours  NIFEdipine XL 60 milliGRAM(s) Oral daily  nystatin    Suspension 202956 Unit(s) Oral <User Schedule>  pantoprazole    Tablet 40 milliGRAM(s) Oral before breakfast  polyethylene glycol 3350 17 Gram(s) Oral daily  predniSONE   Tablet 20 milliGRAM(s) Oral every 12 hours  sildenafil (REVATIO) 10 milliGRAM(s) Oral three times a day  tacrolimus 4 milliGRAM(s) Oral <User Schedule>  traMADol 25 milliGRAM(s) Oral every 8 hours PRN  valGANciclovir 450 milliGRAM(s) Oral <User Schedule>  vancomycin    Solution 125 milliGRAM(s) Oral every 12 hours  vancomycin  IVPB 500 milliGRAM(s) IV Intermittent <User Schedule>                    Physical Therapy Rec:   Home  [  ]   Home w/ PT  [  ]  Rehab  [  ]  Discussed with Cardiothoracic Team at AM rounds.     VITAL SIGNS    Telemetry:  nsr 80    Vital Signs Last 24 Hrs  T(C): 36.7 (22 @ 07:16), Max: 36.9 (22 @ 22:38)  T(F): 98 (22 @ 07:16), Max: 98.4 (22 @ 22:38)  HR: 90 (22 @ 07:16) (90 - 98)  BP: 127/63 (22 @ 07:16) (105/61 - 130/74)  RR: 18 (22 @ 07:16) (18 - 18)  SpO2: 95% (22 @ 07:16) (94% - 96%)                    @ 07:01  -   @ 07:00  --------------------------------------------------------  IN: 1110 mL / OUT: 2845 mL / NET: -1735 mL          Daily     Daily Weight in k (2022 07:16)            CAPILLARY BLOOD GLUCOSE      POCT Blood Glucose.: 147 mg/dL (2022 07:20)  POCT Blood Glucose.: 222 mg/dL (2022 20:49)  POCT Blood Glucose.: 238 mg/dL (2022 16:40)  POCT Blood Glucose.: 202 mg/dL (2022 11:37)                  Coumadin    [ ] YES          [x  ]      NO                                   PHYSICAL EXAM        Neurology: alert and oriented x 3, nonfocal, no gross deficits  CV : s1 s2 RRR  Sternal Wound :  CDI , Stable  Lungs: cta  Abdomen: soft, nontender, nondistended, positive bowel sounds, last bowel movement                       chest tubes x 2  :    voiding   Extremities:    -  edema   /  -   calve tenderness ,             atovaquone  Suspension 1500 milliGRAM(s) Oral daily  cefepime   IVPB 1000 milliGRAM(s) IV Intermittent every 8 hours  chlorhexidine 2% Cloths 1 Application(s) Topical <User Schedule>  furosemide    Tablet 40 milliGRAM(s) Oral two times a day  heparin   Injectable 5000 Unit(s) SubCutaneous every 8 hours  hydrALAZINE 25 milliGRAM(s) Oral every 8 hours  insulin glargine Injectable (LANTUS) 12 Unit(s) SubCutaneous at bedtime  insulin lispro (ADMELOG) corrective regimen sliding scale   SubCutaneous three times a day before meals  insulin lispro (ADMELOG) corrective regimen sliding scale   SubCutaneous at bedtime  multivitamin 1 Tablet(s) Oral daily  mycophenolate mofetil 1000 milliGRAM(s) Oral every 12 hours  NIFEdipine XL 60 milliGRAM(s) Oral daily  nystatin    Suspension 193342 Unit(s) Oral <User Schedule>  pantoprazole    Tablet 40 milliGRAM(s) Oral before breakfast  polyethylene glycol 3350 17 Gram(s) Oral daily  predniSONE   Tablet 20 milliGRAM(s) Oral every 12 hours  sildenafil (REVATIO) 10 milliGRAM(s) Oral three times a day  tacrolimus 4 milliGRAM(s) Oral <User Schedule>  traMADol 25 milliGRAM(s) Oral every 8 hours PRN  valGANciclovir 450 milliGRAM(s) Oral <User Schedule>  vancomycin    Solution 125 milliGRAM(s) Oral every 12 hours  vancomycin  IVPB 500 milliGRAM(s) IV Intermittent <User Schedule>                    Physical Therapy Rec:   Home  [  ]   Home w/ PT  [  ]  Rehab  [  ]  Discussed with Cardiothoracic Team at AM rounds.

## 2022-07-01 NOTE — CONSULT NOTE ADULT - REASON FOR ADMISSION
LVAD/OHT eval

## 2022-07-01 NOTE — CONSULT NOTE ADULT - CONSULT REQUESTED DATE/TIME
03-Jun-2022 08:37
27-May-2022 16:46
29-Jun-2022 13:23
01-Jun-2022
01-Jun-2022
27-May-2022 16:21
15-Gus-2022 14:19
17-Jun-2022 15:35
01-Jul-2022
01-Jun-2022 14:55
31-May-2022 09:29
24-Jun-2022 15:00
31-May-2022
27-May-2022 14:45

## 2022-07-01 NOTE — CONSULT NOTE ADULT - PROVIDER SPECIALTY LIST ADULT
Heart Failure
Psychology
Transplant Hepatology
Heme/Onc
Vascular Cardiology
Transplant ID
CT Surgery
Dental
Gastroenterology
Electrophysiology
Nephrology
Nephrology
Palliative Care
Endocrinology

## 2022-07-01 NOTE — PROGRESS NOTE ADULT - SUBJECTIVE AND OBJECTIVE BOX
INFECTIOUS DISEASES FOLLOW UP-- Arabella Escalante  [phone]    This is a follow up note for this  66yMale with  Cardiomyopathy  s/p heart transplant        ROS:  CONSTITUTIONAL:  No fever, good appetite  CARDIOVASCULAR:  No chest pain or palpitations  RESPIRATORY:  No dyspnea  GASTROINTESTINAL:  No nausea, vomiting, diarrhea, or abdominal pain  GENITOURINARY:  No dysuria  NEUROLOGIC:  No headache,     Allergies    No Known Allergies    Intolerances        ANTIBIOTICS/RELEVANT:  antimicrobials  atovaquone  Suspension 1500 milliGRAM(s) Oral daily  cefepime   IVPB 1000 milliGRAM(s) IV Intermittent every 8 hours  nystatin    Suspension 209317 Unit(s) Oral <User Schedule>  valGANciclovir 450 milliGRAM(s) Oral every 12 hours  vancomycin    Solution 125 milliGRAM(s) Oral every 12 hours  vancomycin  IVPB 500 milliGRAM(s) IV Intermittent <User Schedule>    immunologic:  mycophenolate mofetil 1000 milliGRAM(s) Oral every 12 hours  tacrolimus 4 milliGRAM(s) Oral <User Schedule>    OTHER:  chlorhexidine 2% Cloths 1 Application(s) Topical <User Schedule>  furosemide    Tablet 40 milliGRAM(s) Oral two times a day  heparin   Injectable 5000 Unit(s) SubCutaneous every 8 hours  insulin glargine Injectable (LANTUS) 12 Unit(s) SubCutaneous at bedtime  insulin lispro (ADMELOG) corrective regimen sliding scale   SubCutaneous three times a day before meals  insulin lispro (ADMELOG) corrective regimen sliding scale   SubCutaneous at bedtime  multivitamin 1 Tablet(s) Oral daily  NIFEdipine XL 60 milliGRAM(s) Oral daily  pantoprazole    Tablet 40 milliGRAM(s) Oral before breakfast  polyethylene glycol 3350 17 Gram(s) Oral daily  predniSONE   Tablet 20 milliGRAM(s) Oral every 12 hours  sildenafil (REVATIO) 10 milliGRAM(s) Oral three times a day  traMADol 25 milliGRAM(s) Oral every 8 hours PRN      Objective:  Vital Signs Last 24 Hrs  T(C): 36.7 (01 Jul 2022 14:23), Max: 36.9 (30 Jun 2022 22:38)  T(F): 98 (01 Jul 2022 14:23), Max: 98.4 (30 Jun 2022 22:38)  HR: 97 (01 Jul 2022 14:23) (90 - 100)  BP: 98/54 (01 Jul 2022 14:23) (98/54 - 130/74)  BP(mean): 70 (01 Jul 2022 14:23) (70 - 95)  RR: 18 (01 Jul 2022 14:23) (18 - 18)  SpO2: 94% (01 Jul 2022 14:23) (94% - 97%)    PHYSICAL EXAM:  Constitutional:no acute distress  Eyes:JOHNY, EOMI  Ear/Nose/Throat: no oral lesions, 	  Respiratory: clear BL  Cardiovascular: S1S2  Gastrointestinal:soft, (+) BS, no tenderness  Extremities:no e/e/c  No Lymphadenopathy  IV sites not inflammed.    LABS:                        11.7   15.49 )-----------( 339      ( 01 Jul 2022 07:57 )             34.8     07-01    134<L>  |  97  |  50<H>  ----------------------------<  158<H>  4.7   |  23  |  1.46<H>    Ca    9.7      01 Jul 2022 07:57    TPro  6.6  /  Alb  4.3  /  TBili  1.2  /  DBili  x   /  AST  19  /  ALT  30  /  AlkPhos  53  07-01          MICROBIOLOGY:    COVID-19 PCR: NotDetec: You can help in the fight against COVID-19. [employer] [facility] may contact  you to see if you are interested in voluntarily participating in one of  our clinical trials.  Testing is performed using polymerase chain reaction (PCR) or  transcription mediated amplification (TMA). This COVID-19 (SARS-CoV-2)  nucleic acid amplification test was validated by [employer] and is  in use under the FDA Emergency Use Authorization (EUA) for clinical labs  CLIA-certified to perform high complexity testing. Test results should be  correlated with clinical presentation, patient history, and epidemiology. (06.27.22 @ 16:27)            RECENT CULTURES:      RADIOLOGY & ADDITIONAL STUDIES:  < from: VA Duplex Lower Ext Vein Scan, Bilat (06.30.22 @ 10:24) >  IMPRESSION:  No evidence of deep venous thrombosis in either lower extremity.    < end of copied text >  < from: Xray Chest 1 View- PORTABLE-Urgent (Xray Chest 1 View- PORTABLE-Urgent .) (06.29.22 @ 14:55) >  IMPRESSION:  Clear lungs.    < end of copied text >   INFECTIOUS DISEASES FOLLOW UP-- Arabella Escalante  [phone]    This is a follow up note for this  66yMale with  Cardiomyopathy  s/p heart transplant  old HIMA pocket fluid grew staph epi and morganella on IV abx planned two week course        ROS:  CONSTITUTIONAL:  No fever, good appetite  CARDIOVASCULAR:  No chest pain or palpitations  RESPIRATORY:  No dyspnea  GASTROINTESTINAL:  No nausea, vomiting, diarrhea, or abdominal pain  GENITOURINARY:  No dysuria  NEUROLOGIC:  No headache,     Allergies    No Known Allergies    Intolerances        ANTIBIOTICS/RELEVANT:  antimicrobials  atovaquone  Suspension 1500 milliGRAM(s) Oral daily  cefepime   IVPB 1000 milliGRAM(s) IV Intermittent every 8 hours  nystatin    Suspension 512716 Unit(s) Oral <User Schedule>  valGANciclovir 450 milliGRAM(s) Oral every 12 hours  vancomycin    Solution 125 milliGRAM(s) Oral every 12 hours  vancomycin  IVPB 500 milliGRAM(s) IV Intermittent <User Schedule>    immunologic:  mycophenolate mofetil 1000 milliGRAM(s) Oral every 12 hours  tacrolimus 4 milliGRAM(s) Oral <User Schedule>    OTHER:  chlorhexidine 2% Cloths 1 Application(s) Topical <User Schedule>  furosemide    Tablet 40 milliGRAM(s) Oral two times a day  heparin   Injectable 5000 Unit(s) SubCutaneous every 8 hours  insulin glargine Injectable (LANTUS) 12 Unit(s) SubCutaneous at bedtime  insulin lispro (ADMELOG) corrective regimen sliding scale   SubCutaneous three times a day before meals  insulin lispro (ADMELOG) corrective regimen sliding scale   SubCutaneous at bedtime  multivitamin 1 Tablet(s) Oral daily  NIFEdipine XL 60 milliGRAM(s) Oral daily  pantoprazole    Tablet 40 milliGRAM(s) Oral before breakfast  polyethylene glycol 3350 17 Gram(s) Oral daily  predniSONE   Tablet 20 milliGRAM(s) Oral every 12 hours  sildenafil (REVATIO) 10 milliGRAM(s) Oral three times a day  traMADol 25 milliGRAM(s) Oral every 8 hours PRN      Objective:  Vital Signs Last 24 Hrs  T(C): 36.7 (01 Jul 2022 14:23), Max: 36.9 (30 Jun 2022 22:38)  T(F): 98 (01 Jul 2022 14:23), Max: 98.4 (30 Jun 2022 22:38)  HR: 97 (01 Jul 2022 14:23) (90 - 100)  BP: 98/54 (01 Jul 2022 14:23) (98/54 - 130/74)  BP(mean): 70 (01 Jul 2022 14:23) (70 - 95)  RR: 18 (01 Jul 2022 14:23) (18 - 18)  SpO2: 94% (01 Jul 2022 14:23) (94% - 97%)    PHYSICAL EXAM:  Constitutional:no acute distress  Eyes:JOHNY, EOMI  Ear/Nose/Throat: no oral lesions, 	  Respiratory: clear BL  chest tube remains  Cardiovascular: S1S2  sternotomy site CDI  ICD old site CDI  Gastrointestinal:soft, (+) BS, no tenderness  Extremities:no e/e/c  No Lymphadenopathy  IV sites not inflammed.    LABS:                        11.7   15.49 )-----------( 339      ( 01 Jul 2022 07:57 )             34.8     07-01    134<L>  |  97  |  50<H>  ----------------------------<  158<H>  4.7   |  23  |  1.46<H>    Ca    9.7      01 Jul 2022 07:57    TPro  6.6  /  Alb  4.3  /  TBili  1.2  /  DBili  x   /  AST  19  /  ALT  30  /  AlkPhos  53  07-01          MICROBIOLOGY:    COVID-19 PCR: NotDetec: You can help in the fight against COVID-19. [employer] [facility] may contact  you to see if you are interested in voluntarily participating in one of  our clinical trials.  Testing is performed using polymerase chain reaction (PCR) or  transcription mediated amplification (TMA). This COVID-19 (SARS-CoV-2)  nucleic acid amplification test was validated by [employer] and is  in use under the FDA Emergency Use Authorization (EUA) for clinical labs  CLIA-certified to perform high complexity testing. Test results should be  correlated with clinical presentation, patient history, and epidemiology. (06.27.22 @ 16:27)            RECENT CULTURES:      RADIOLOGY & ADDITIONAL STUDIES:  < from: VA Duplex Lower Ext Vein Scan, Bilat (06.30.22 @ 10:24) >  IMPRESSION:  No evidence of deep venous thrombosis in either lower extremity.    < end of copied text >  < from: Xray Chest 1 View- PORTABLE-Urgent (Xray Chest 1 View- PORTABLE-Urgent .) (06.29.22 @ 14:55) >  IMPRESSION:  Clear lungs.    < end of copied text >

## 2022-07-01 NOTE — PROGRESS NOTE ADULT - ASSESSMENT
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 LHC did not reveal new obstructive CAD and he underwent EPS which did not reveal endocardial substrate amenable for ablation. RHC revealed severely depressed cardiac output and he was transferred to [facility] 5/26 for advanced therapies evaluation. Pt was evaluated bu transplant ID for potential OHT. He became febrile, last 6/7 T 38. He has been afebrile since and blood cultures from 6/7 with NGTD x 48 hours and his leukocytosis has not worsened. A suitable donor was identified and patient underwent OHT with Dr. Earl/Maria C on 6/21 (ischemic time 261 mins, CMV +/+, Toxo -/-). On POD 1 IABP was removed. Extubated afternoon 6/22. Cultures from his ICD site in the OR are returning positive for staph epidermidis/ morganella morganii, remains on IV abx.     WORKUP  Surgical Swab of AICD pocket (6/21): MRSE and Morganella morganii  VA Duplex Upper Ext Vein Scan, Bilat (06.28): There is acute, partially occlusive thrombus affecting the left subclavian vein. The right basilic and cephalic veins, superficial veins, are again  demonstrated to be thrombosed.   Xray Chest (06.27): Bibasilar atelectasis. No significant interval change.  [facility]    DIAGNOSIS and IMPRESSION  # AICD Positive Culture Finding (MRSE and Morganella)  # OHT 6/21 (CMV +/+, Toxo -/-)  # JAGRUTI/CKD   # LUE non-occlusive DVT and RUE superficial Thrombus  Afebrile, uptrending leukocytosis to 15k  Only in liquid media so could be contaminant but reasonable to cover given murky fluid noted around pocket at time of explant  Vanc T 20 on 6/26 and Vanc dose was held but became subtherapeutic on 6/28 and 6/29 -> Restarted on 6/28 at 500mg QD  Susceptibilities of Staph epi and Morganella suggest that in the future could simplify to Ceftriaxone plus Vancomycin    RECOMMENDATIONS  Continue Cefepime 1gm Q8 and Vancomycin -> Can switch to cefpodoxime and Doxy at time of discharge  Vanc level still low and Dose increased to Vanc 500mg Q12  Will aim for a total of 2-3 weeks of abx therapy from AICD explant date.   CMV intermediate risk: c/w valcyte  toxo low risk: no PPx  PJP: c/w Mepron  Thrush: Nystatin  On PO Vancomycin as per CTU ppx protocol    Reggie Escalante MD  Can be called via Teams  After 5pm/weekends [phone]

## 2022-07-01 NOTE — PROGRESS NOTE ADULT - SUBJECTIVE AND OBJECTIVE BOX
Patient seen and examined at bedside.    Overnight Events:     No AEON              Current Meds:  atovaquone  Suspension 1500 milliGRAM(s) Oral daily  cefepime   IVPB 1000 milliGRAM(s) IV Intermittent every 8 hours  chlorhexidine 2% Cloths 1 Application(s) Topical <User Schedule>  furosemide    Tablet 40 milliGRAM(s) Oral two times a day  heparin   Injectable 5000 Unit(s) SubCutaneous every 8 hours  insulin glargine Injectable (LANTUS) 12 Unit(s) SubCutaneous at bedtime  insulin lispro (ADMELOG) corrective regimen sliding scale   SubCutaneous three times a day before meals  insulin lispro (ADMELOG) corrective regimen sliding scale   SubCutaneous at bedtime  multivitamin 1 Tablet(s) Oral daily  mycophenolate mofetil 1000 milliGRAM(s) Oral every 12 hours  NIFEdipine XL 60 milliGRAM(s) Oral daily  nystatin    Suspension 184068 Unit(s) Oral <User Schedule>  pantoprazole    Tablet 40 milliGRAM(s) Oral before breakfast  polyethylene glycol 3350 17 Gram(s) Oral daily  predniSONE   Tablet 20 milliGRAM(s) Oral every 12 hours  sildenafil (REVATIO) 10 milliGRAM(s) Oral three times a day  tacrolimus 4 milliGRAM(s) Oral <User Schedule>  traMADol 25 milliGRAM(s) Oral every 8 hours PRN  valGANciclovir 450 milliGRAM(s) Oral every 12 hours  vancomycin    Solution 125 milliGRAM(s) Oral every 12 hours  vancomycin  IVPB 500 milliGRAM(s) IV Intermittent <User Schedule>      Vitals:  T(F): 98 (07-01), Max: 98.4 (06-30)  HR: 97 (07-01) (90 - 100)  BP: 98/54 (07-01) (98/54 - 130/74)  RR: 18 (07-01)  SpO2: 94% (07-01)  I&O's Summary    30 Jun 2022 07:01  -  01 Jul 2022 07:00  --------------------------------------------------------  IN: 1110 mL / OUT: 2845 mL / NET: -1735 mL    01 Jul 2022 07:01  -  01 Jul 2022 15:23  --------------------------------------------------------  IN: 150 mL / OUT: 1000 mL / NET: -850 mL        Physical Exam:  General: No distress. Comfortable.  HEENT: EOM intact.  Neck: Neck supple. JVP not elevated. No masses  Chest: Clear to auscultation bilaterally  CV: Normal S1 and S2. No murmurs, rub, or gallops. Radial pulses normal.  Abdomen: Soft, non-distended, non-tender  Neurology: Alert and oriented times three.                           11.7   15.49 )-----------( 339      ( 01 Jul 2022 07:57 )             34.8     07-01    134<L>  |  97  |  50<H>  ----------------------------<  158<H>  4.7   |  23  |  1.46<H>    Ca    9.7      01 Jul 2022 07:57    TPro  6.6  /  Alb  4.3  /  TBili  1.2  /  DBili  x   /  AST  19  /  ALT  30  /  AlkPhos  53  07-01                  New ECG(s): Personally reviewed    Echo:    Stress Testing:     Cath:    Imaging:    Interpretation of Telemetry:

## 2022-07-01 NOTE — CONSULT NOTE ADULT - SUBJECTIVE AND OBJECTIVE BOX
Vascular Cardiology Consult Note    SERVICE CONSULT: [phone]              OFFICE [phone]    CC: LVAD/OHT eval      HPI: 65 y/o M w/ Cardiomyopathy s/p BIVICD, CAD s/p PCI, presented with near syncope, VT, transferred to [facility] for LVAD/OHT eval, s/p OHT and ICD removal on 6/21. Patient noted to have L acute, partially occlusive thrombus affecting the left   subclavian vein. R basilic and cephalic veins, superficial veins, are again demonstrated to be thrombosed. LE venous duplex showed no DVT. Currently stable on RA. Reports trace L UE edema, and mild L LE edema. No extremity pain. Denies C/P or SOB. Vascular cardiology consulted.       Allergies  No Known Allergies      MEDICATIONS:  furosemide    Tablet 40 milliGRAM(s) Oral two times a day  heparin   Injectable 5000 Unit(s) SubCutaneous every 8 hours  NIFEdipine XL 60 milliGRAM(s) Oral daily  sildenafil (REVATIO) 10 milliGRAM(s) Oral three times a day  atovaquone  Suspension 1500 milliGRAM(s) Oral daily  cefepime   IVPB 1000 milliGRAM(s) IV Intermittent every 8 hours  nystatin    Suspension 883375 Unit(s) Oral <User Schedule>  valGANciclovir 450 milliGRAM(s) Oral <User Schedule>  vancomycin    Solution 125 milliGRAM(s) Oral every 12 hours  vancomycin  IVPB 500 milliGRAM(s) IV Intermittent <User Schedule>  traMADol 25 milliGRAM(s) Oral every 8 hours PRN  pantoprazole    Tablet 40 milliGRAM(s) Oral before breakfast  polyethylene glycol 3350 17 Gram(s) Oral daily  insulin glargine Injectable (LANTUS) 12 Unit(s) SubCutaneous at bedtime  insulin lispro (ADMELOG) corrective regimen sliding scale   SubCutaneous three times a day before meals  insulin lispro (ADMELOG) corrective regimen sliding scale   SubCutaneous at bedtime  predniSONE   Tablet 20 milliGRAM(s) Oral every 12 hours  chlorhexidine 2% Cloths 1 Application(s) Topical <User Schedule>  multivitamin 1 Tablet(s) Oral daily  mycophenolate mofetil 1000 milliGRAM(s) Oral every 12 hours  tacrolimus 4 milliGRAM(s) Oral <User Schedule>    PAST MEDICAL & SURGICAL HISTORY:  AV block  Essential hypertension  Chronic HFrEF (heart failure with reduced ejection fraction)  Chronic kidney disease  CAD (coronary artery disease  HLD (hyperlipidemia)  Type 2 diabetes mellitus  Artificial cardiac pacemaker    FAMILY HISTORY:  Family history of acute myocardial infarction (Father)    SOCIAL HISTORY:  unchanged    REVIEW OF SYSTEMS:  CONSTITUTIONAL: No fever  EYES: No eye pain  ENT:  No throat pain  NECK: No pain   RESPIRATORY: No current SOB  CARDIOVASCULAR: No current C/P  GASTROINTESTINAL: No abdominal pain  GENITOURINARY: No hematuria  NEUROLOGICAL: No memory loss  SKIN: No rash  LYMPH Nodes: No enlarged glands noted  ENDOCRINE: No heat or cold intolerance noted  MUSCULOSKELETAL: L LE edema   PSYCHIATRIC: No depression, anxiety  HEME/LYMPH: No  bleeding gums  ALLERGY AND IMMUNOLOGIC: No hives    [ x] All others negative	    PHYSICAL EXAM:  T(C): 36.7 (07-01-22 @ 14:23), Max: 36.9 (06-30-22 @ 22:38)  HR: 97 (07-01-22 @ 14:23) (90 - 100)  BP: 98/54 (07-01-22 @ 14:23) (98/54 - 130/74)  RR: 18 (07-01-22 @ 14:23) (18 - 18)  SpO2: 94% (07-01-22 @ 14:23) (94% - 97%)  I&O's Summary    30 Jun 2022 07:01  -  01 Jul 2022 07:00  --------------------------------------------------------  IN: 1110 mL / OUT: 2845 mL / NET: -1735 mL    01 Jul 2022 07:01  -  01 Jul 2022 14:59  --------------------------------------------------------  IN: 150 mL / OUT: 1000 mL / NET: -850 mL    Appearance: NAD  	  HEENT:  NC/AT  Cardiovascular: RRR, S1 and S2, Sternal Incision Intact   Respiratory: CTA B/L  Psychiatry:  AAO x 3  Gastrointestinal:  Soft, Non-tender, + BS	  Skin: No rashes, No ecchymoses, No cyanosis	  Neurologic: no focal deficit noted    Extremities: mild L LE edema, trace L UE edema  L radial pulse palpable, bilateral LE DP palpable       LABS:	 	    CBC Full  -  ( 01 Jul 2022 07:57 )  WBC Count : 15.49 K/uL  Hemoglobin : 11.7 g/dL  Hematocrit : 34.8 %  Platelet Count - Automated : 339 K/uL  Mean Cell Volume : 87.7 fl  Mean Cell Hemoglobin : 29.5 pg  Mean Cell Hemoglobin Concentration : 33.6 gm/dL  Auto Neutrophil # : 12.17 K/uL  Auto Lymphocyte # : 1.84 K/uL  Auto Monocyte # : 1.00 K/uL  Auto Eosinophil # : 0.00 K/uL  Auto Basophil # : 0.02 K/uL  Auto Neutrophil % : 78.5 %  Auto Lymphocyte % : 11.9 %  Auto Monocyte % : 6.5 %  Auto Eosinophil % : 0.0 %  Auto Basophil % : 0.1 %    07-01    134<L>  |  97  |  50<H>  ----------------------------<  158<H>  4.7   |  23  |  1.46<H>  06-30    134<L>  |  99  |  46<H>  ----------------------------<  143<H>  4.9   |  25  |  1.46<H>    Ca    9.7      01 Jul 2022 07:57  Ca    9.6      30 Jun 2022 07:32    TPro  6.6  /  Alb  4.3  /  TBili  1.2  /  DBili  x   /  AST  19  /  ALT  30  /  AlkPhos  53  07-01  TPro  6.4  /  Alb  4.3  /  TBili  1.0  /  DBili  x   /  AST  14  /  ALT  24  /  AlkPhos  48  06-30      Assessment:  1. L partially occlusive thrombus affecting the left subclavian vein  2. Superficial R UE VT  3. S/p OHT on 6/21  4. JAGRUTI     Plan:  1. Continue Heparin Sub. Cut. 5000 units TID.  2. Currently stable on RA.  3. Plan for repeat surveillance L upper extremity venous duplex in 5-7 days.  4. Will discuss case with Dr. Earl. Appreciate excellent care.     Thank you  RAMSES Mota, MS  Vascular Cardiology Service    Please call with any questions:   Service Line: [phone]  Office [phone]

## 2022-07-01 NOTE — PROGRESS NOTE ADULT - PROBLEM SELECTOR PLAN 1
·  Plan: - s/p OHT with Dr. Earl/Maria C on 6/21 (ischemic time 261 min)  - IABP removed on POD 1  - d/c primacor today 6/29  - adjust tacro.   Lasix 40 mg PO BID 6/29  - continue hydralazine 25 mg PO TID and Procardia 60mg PO QD. ·  Plan: - s/p OHT with Dr. Earl/Maria C on 6/21 (ischemic time 261 min)  - IABP removed on POD 1  - d/c primacor today 6/29  - adjust tacro.   Lasix 40 mg PO BID 6/29  - continue hydralazine 25 mg PO TID and Procardia 60mg PO QD.--7/1 d/c hydralazine

## 2022-07-01 NOTE — CONSULT NOTE ADULT - NS ATTEND AMEND GEN_ALL_CORE FT
Upper extremity SC Vein DVT noted  Likely 2/2 ICD explant  At this moment would continue with prophylactic dose a/c  Repeat upper ext venous duplex Tuesday to assure no propagation    Samia [phone]

## 2022-07-01 NOTE — CONSULT NOTE ADULT - CONSULT REASON
Hyponatremia
JAGRUTI
clearance prior to surgery
dm, hyperglycemia
L UE DVT
LVAD/OHT eval
Psychological assessment as part of LVAD-heart transplant evaluation
Hx of VT
PCV eval
LVAD/OHT eval
colonoscopy
pre-advanced therapies eval
LVAD/OHT evaluation
reduced EF, LVAD/OHT eval

## 2022-07-01 NOTE — PROGRESS NOTE ADULT - ASSESSMENT
66M Mixed Ischemic/NICM Cardiomyopathy (EF 25-30% at baseline, s/p BIV-ICD), CAD (medically managed MIs in ,, Most recent stent in 2022 to mLAD) presented with multiple near syncope, found to have 41 episodes of VT and admitted initially to cardiac telemetry for further evaluation/management. Ischemic evaluation without new disease and VT ablation without substrate to complete ablation. Transferred from [facility] to [facility] for further management and evaluation for LVAD vs OHT. CT surgery consulted for LVAD/transplant evaluation.   Currently undergoing LVAD/transplant eval  Care per Heart failure and CCU primary team  Preop work up and diagnostics in progress  22 s/pOrthotopic heart transplant, ICD removal, iabp  Post op inotropes /pressors, nitric oxide  /Primacor/insulin  extubated d 1, hi flow   iabp d/c    nitric d/c   s/p biopsy, pw d/c,echo neg effusion  Htn-hydralazine, procardia added increased   JAGRUTI-stable, renal transplant following  hyponatremia, lasix/albumin  L sc thrombus,R cephalic thrombus on heparin sq---> plan to check LE duplex and if + may need full anticoagulation  Insulin infusion remains on  On vanco and cefepime for icd pocket + staph epi and morganella   primacor d/c   Transferred to sdu  As per renal lasix increased 40po bid  Chest tube # 3 d/c   VSS; insulin gttp weaned off overnight;  RSR 80-90; ?d/c med ct today- d/w Dr. Lombardi; LE sono neg DVT; tacro level today 10- continue tacro 4 po bid; vanco trough level 5.6- vanco 500 mg iv bid; change to po abx at home   Discharge planning- home ? fri; ck covid pcr; next biopsy scheduled     66M Mixed Ischemic/NICM Cardiomyopathy (EF 25-30% at baseline, s/p BIV-ICD), CAD (medically managed MIs in ,, Most recent stent in 2022 to mLAD) presented with multiple near syncope, found to have 41 episodes of VT and admitted initially to cardiac telemetry for further evaluation/management. Ischemic evaluation without new disease and VT ablation without substrate to complete ablation. Transferred from [facility] to [facility] for further management and evaluation for LVAD vs OHT. CT surgery consulted for LVAD/transplant evaluation.   Currently undergoing LVAD/transplant eval  Care per Heart failure and CCU primary team  Preop work up and diagnostics in progress  22 s/pOrthotopic heart transplant, ICD removal, iabp  Post op inotropes /pressors, nitric oxide  /Primacor/insulin  extubated d , hi flow   iabp d/c    nitric d/c   s/p biopsy, pw d/c,echo neg effusion  Htn-hydralazine, procardia added increased   JAGRUTI-stable, renal transplant following  hyponatremia, lasix/albumin  L sc thrombus,R cephalic thrombus on heparin sq---> plan to check LE duplex and if + may need full anticoagulation  Insulin infusion remains on  On vanco and cefepime for icd pocket + staph epi and morganella   primacor d/c   Transferred to sdu  As per renal lasix increased 40po bid  Chest tube # 3 d/c   VSS; insulin gttp weaned off overnight;  RSR 80-90; ?d/c med ct today- d/w Dr. Lombardi; LE sono neg DVT; tacro level today 10- continue tacro 4 po bid; vanco trough level 5.6- vanco 500 mg iv bid; change to po abx at home   Discharge planning- home ? fri; ck covid pcr; next biopsy scheduled  Call [facility] cardiology for anticoagulation recs. D/c hydralazine to maximize sildenefil. Hep sq.   D/c one  Ms ct today  Cont bid diuresis

## 2022-07-01 NOTE — PROGRESS NOTE ADULT - SUBJECTIVE AND OBJECTIVE BOX
[facility] DIVISION OF KIDNEY DISEASES AND HYPERTENSION   FOLLOW UP NOTE    --------------------------------------------------------------------------------  66M male with PMHx HTN, HLD, type 2 DM, chronic HFrEF,  (EF 25-30% by Echo) underwent OHT on 6/21/22. Nephrology following for JAGRUTI.     Upon review of labs, Scr was 1.2 on 4/19/22 and was elevated at 1.8 on 5/24/22. SCr downtrended to 1.01 on 6/20/22. Pt underwent OHT on 6/21/22 and required IABP post operatively. Scr uptrended to 1.23 on 6/23/22 with elevated PA pressures. SCr increased to 1.97. Pt currently on diuretics and non oliguric. UA done on 6/8/22 showed trace blood and proteinuria.  SCr stable/elevated at 1.4 today.    Pt. seen and examined today sitting comfortably in chair, non oliguric with UOP: ~2.8 l.    PAST HISTORY  --------------------------------------------------------------------------------  No significant changes to PMH, PSH, FHx, SHx, unless otherwise noted    ALLERGIES & MEDICATIONS  --------------------------------------------------------------------------------  Allergies    No Known Allergies    Intolerances      Standing Inpatient Medications  atovaquone  Suspension 1500 milliGRAM(s) Oral daily  cefepime   IVPB 1000 milliGRAM(s) IV Intermittent every 8 hours  chlorhexidine 2% Cloths 1 Application(s) Topical <User Schedule>  furosemide    Tablet 40 milliGRAM(s) Oral two times a day  heparin   Injectable 5000 Unit(s) SubCutaneous every 8 hours  insulin glargine Injectable (LANTUS) 12 Unit(s) SubCutaneous at bedtime  insulin lispro (ADMELOG) corrective regimen sliding scale   SubCutaneous three times a day before meals  insulin lispro (ADMELOG) corrective regimen sliding scale   SubCutaneous at bedtime  multivitamin 1 Tablet(s) Oral daily  mycophenolate mofetil 1000 milliGRAM(s) Oral every 12 hours  NIFEdipine XL 60 milliGRAM(s) Oral daily  nystatin    Suspension 879052 Unit(s) Oral <User Schedule>  pantoprazole    Tablet 40 milliGRAM(s) Oral before breakfast  polyethylene glycol 3350 17 Gram(s) Oral daily  predniSONE   Tablet 20 milliGRAM(s) Oral every 12 hours  sildenafil (REVATIO) 10 milliGRAM(s) Oral three times a day  tacrolimus 4 milliGRAM(s) Oral <User Schedule>  valGANciclovir 450 milliGRAM(s) Oral <User Schedule>  vancomycin    Solution 125 milliGRAM(s) Oral every 12 hours  vancomycin  IVPB 500 milliGRAM(s) IV Intermittent <User Schedule>    PRN Inpatient Medications  traMADol 25 milliGRAM(s) Oral every 8 hours PRN      REVIEW OF SYSTEMS  --------------------------------------------------------------------------------  Gen: No fevers/chills  Skin: No rashes  Head/Eyes/Ears: Normal hearing,   Respiratory: No dyspnea, cough  CV: No chest pain  GI: No abdominal pain, diarrhea  : No dysuria, hematuria  MSK:  +edema (improved)    All other systems were reviewed and are negative, except as noted.    VITALS/PHYSICAL EXAM  --------------------------------------------------------------------------------  T(C): 36.7 (07-01-22 @ 07:16), Max: 36.9 (06-30-22 @ 22:38)  HR: 90 (07-01-22 @ 07:16) (90 - 98)  BP: 127/63 (07-01-22 @ 07:16) (110/60 - 130/74)  RR: 18 (07-01-22 @ 07:16) (18 - 18)  SpO2: 95% (07-01-22 @ 07:16) (94% - 95%)  Wt(kg): --      06-30-22 @ 07:01  -  07-01-22 @ 07:00  --------------------------------------------------------  IN: 1110 mL / OUT: 2845 mL / NET: -1735 mL    07-01-22 @ 07:01  -  07-01-22 @ 11:14  --------------------------------------------------------  IN: 0 mL / OUT: 400 mL / NET: -400 mL      Physical Exam:  	Gen: NAD  	HEENT: Anicteric  	Pulm: CTA B/L  	CV: S1S2+, midline sx scar, dressing +  	Abd: Soft, +BS    	Ext: LE edema B/L (trace)  	Neuro: Awake  	Skin: Warm and dry    LABS/STUDIES  --------------------------------------------------------------------------------              11.7   15.49 >-----------<  339      [07-01-22 @ 07:57]              34.8     134  |  97  |  50  ----------------------------<  158      [07-01-22 @ 07:57]  4.7   |  23  |  1.46        Ca     9.7     [07-01-22 @ 07:57]    Creatinine Trend:  SCr 1.46 [07-01 @ 07:57]  SCr 1.46 [06-30 @ 07:32]  SCr 1.70 [06-29 @ 00:47]  SCr 1.65 [06-28 @ 00:18]  SCr 2.32 [06-26 @ 23:56]    Urinalysis - [06-08-22 @ 00:26]      Color Light Yellow / Appearance Clear / SG 1.014 / pH 6.0      Gluc Negative / Ketone Negative  / Bili Negative / Urobili Negative       Blood Trace / Protein Trace / Leuk Est Negative / Nitrite Negative      RBC 1 / WBC 0 / Hyaline 0 / Gran  / Sq Epi  / Non Sq Epi 0 / Bacteria Negative        Syphilis Screen (Treponema Pallidum Ab) Negative      [06-02-22 @ 21:12]  Immunofixation Urine:   Reference Range: None Detected      [06-05-22 @ 12:15]  UPEP Interpretation: Normal Electrophoresis Pattern      [06-05-22 @ 12:15]    TacrolimusTacrolimus (), Serum: 16.3 ng/mL (07-01 @ 07:57)  Tacrolimus (), Serum: 10.0 ng/mL (06-30 @ 07:32)  Tacrolimus (), Serum: 16.7 ng/mL (06-29 @ 07:38)  Tacrolimus (), Serum: 7.6 ng/mL (06-28 @ 06:57)    CMV PCRCMVPCR Log: NotDetec Qjr39PT/mL (06-26 @ 23:56)

## 2022-07-02 DIAGNOSIS — I82.409 ACUTE EMBOLISM AND THROMBOSIS OF UNSPECIFIED DEEP VEINS OF UNSPECIFIED LOWER EXTREMITY: ICD-10-CM

## 2022-07-02 LAB
ALBUMIN SERPL ELPH-MCNC: 4.5 G/DL — SIGNIFICANT CHANGE UP (ref 3.3–5)
ALP SERPL-CCNC: 55 U/L — SIGNIFICANT CHANGE UP (ref 40–120)
ALT FLD-CCNC: 37 U/L — SIGNIFICANT CHANGE UP (ref 10–45)
ANION GAP SERPL CALC-SCNC: 13 MMOL/L — SIGNIFICANT CHANGE UP (ref 5–17)
AST SERPL-CCNC: 19 U/L — SIGNIFICANT CHANGE UP (ref 10–40)
BILIRUB SERPL-MCNC: 1.5 MG/DL — HIGH (ref 0.2–1.2)
BUN SERPL-MCNC: 60 MG/DL — HIGH (ref 7–23)
CALCIUM SERPL-MCNC: 10.2 MG/DL — SIGNIFICANT CHANGE UP (ref 8.4–10.5)
CHLORIDE SERPL-SCNC: 95 MMOL/L — LOW (ref 96–108)
CO2 SERPL-SCNC: 27 MMOL/L — SIGNIFICANT CHANGE UP (ref 22–31)
CREAT SERPL-MCNC: 1.74 MG/DL — HIGH (ref 0.5–1.3)
EGFR: 43 ML/MIN/1.73M2 — LOW
GLUCOSE BLDC GLUCOMTR-MCNC: 118 MG/DL — HIGH (ref 70–99)
GLUCOSE BLDC GLUCOMTR-MCNC: 171 MG/DL — HIGH (ref 70–99)
GLUCOSE BLDC GLUCOMTR-MCNC: 175 MG/DL — HIGH (ref 70–99)
GLUCOSE BLDC GLUCOMTR-MCNC: 194 MG/DL — HIGH (ref 70–99)
GLUCOSE SERPL-MCNC: 132 MG/DL — HIGH (ref 70–99)
HCT VFR BLD CALC: 36.8 % — LOW (ref 39–50)
HGB BLD-MCNC: 12.5 G/DL — LOW (ref 13–17)
MAGNESIUM SERPL-MCNC: 1.7 MG/DL — SIGNIFICANT CHANGE UP (ref 1.6–2.6)
MCHC RBC-ENTMCNC: 29.6 PG — SIGNIFICANT CHANGE UP (ref 27–34)
MCHC RBC-ENTMCNC: 34 GM/DL — SIGNIFICANT CHANGE UP (ref 32–36)
MCV RBC AUTO: 87 FL — SIGNIFICANT CHANGE UP (ref 80–100)
NRBC # BLD: 0 /100 WBCS — SIGNIFICANT CHANGE UP (ref 0–0)
PHOSPHATE SERPL-MCNC: 3.4 MG/DL — SIGNIFICANT CHANGE UP (ref 2.5–4.5)
PLATELET # BLD AUTO: 344 K/UL — SIGNIFICANT CHANGE UP (ref 150–400)
POTASSIUM SERPL-MCNC: 5.1 MMOL/L — SIGNIFICANT CHANGE UP (ref 3.5–5.3)
POTASSIUM SERPL-SCNC: 5.1 MMOL/L — SIGNIFICANT CHANGE UP (ref 3.5–5.3)
PROT SERPL-MCNC: 7.1 G/DL — SIGNIFICANT CHANGE UP (ref 6–8.3)
RBC # BLD: 4.23 M/UL — SIGNIFICANT CHANGE UP (ref 4.2–5.8)
RBC # FLD: 19.1 % — HIGH (ref 10.3–14.5)
SODIUM SERPL-SCNC: 135 MMOL/L — SIGNIFICANT CHANGE UP (ref 135–145)
TACROLIMUS SERPL-MCNC: 10.7 NG/ML — SIGNIFICANT CHANGE UP
VANCOMYCIN TROUGH SERPL-MCNC: 10.3 UG/ML — SIGNIFICANT CHANGE UP (ref 10–20)
WBC # BLD: 16.91 K/UL — HIGH (ref 3.8–10.5)
WBC # FLD AUTO: 16.91 K/UL — HIGH (ref 3.8–10.5)

## 2022-07-02 PROCEDURE — 99233 SBSQ HOSP IP/OBS HIGH 50: CPT

## 2022-07-02 PROCEDURE — 99223 1ST HOSP IP/OBS HIGH 75: CPT

## 2022-07-02 PROCEDURE — 71045 X-RAY EXAM CHEST 1 VIEW: CPT | Mod: 26

## 2022-07-02 PROCEDURE — 99232 SBSQ HOSP IP/OBS MODERATE 35: CPT | Mod: 24

## 2022-07-02 RX ORDER — FUROSEMIDE 40 MG
40 TABLET ORAL DAILY
Refills: 0 | Status: DISCONTINUED | OUTPATIENT
Start: 2022-07-03 | End: 2022-07-05

## 2022-07-02 RX ADMIN — HEPARIN SODIUM 5000 UNIT(S): 5000 INJECTION INTRAVENOUS; SUBCUTANEOUS at 16:11

## 2022-07-02 RX ADMIN — MYCOPHENOLATE MOFETIL 1000 MILLIGRAM(S): 250 CAPSULE ORAL at 19:37

## 2022-07-02 RX ADMIN — HEPARIN SODIUM 5000 UNIT(S): 5000 INJECTION INTRAVENOUS; SUBCUTANEOUS at 00:13

## 2022-07-02 RX ADMIN — Medication 100 MILLIGRAM(S): at 08:03

## 2022-07-02 RX ADMIN — Medication 500000 UNIT(S): at 16:11

## 2022-07-02 RX ADMIN — VALGANCICLOVIR 450 MILLIGRAM(S): 450 TABLET, FILM COATED ORAL at 08:04

## 2022-07-02 RX ADMIN — Medication 1: at 17:44

## 2022-07-02 RX ADMIN — Medication 10 MILLIGRAM(S): at 21:58

## 2022-07-02 RX ADMIN — CEFEPIME 100 MILLIGRAM(S): 1 INJECTION, POWDER, FOR SOLUTION INTRAMUSCULAR; INTRAVENOUS at 06:02

## 2022-07-02 RX ADMIN — Medication 500000 UNIT(S): at 19:36

## 2022-07-02 RX ADMIN — Medication 10 MILLIGRAM(S): at 13:08

## 2022-07-02 RX ADMIN — VALGANCICLOVIR 450 MILLIGRAM(S): 450 TABLET, FILM COATED ORAL at 19:38

## 2022-07-02 RX ADMIN — Medication 125 MILLIGRAM(S): at 08:03

## 2022-07-02 RX ADMIN — Medication 500000 UNIT(S): at 08:03

## 2022-07-02 RX ADMIN — ATOVAQUONE 1500 MILLIGRAM(S): 750 SUSPENSION ORAL at 12:06

## 2022-07-02 RX ADMIN — POLYETHYLENE GLYCOL 3350 17 GRAM(S): 17 POWDER, FOR SOLUTION ORAL at 12:07

## 2022-07-02 RX ADMIN — Medication 125 MILLIGRAM(S): at 19:36

## 2022-07-02 RX ADMIN — TACROLIMUS 3 MILLIGRAM(S): 5 CAPSULE ORAL at 08:04

## 2022-07-02 RX ADMIN — Medication 40 MILLIGRAM(S): at 06:02

## 2022-07-02 RX ADMIN — CHLORHEXIDINE GLUCONATE 1 APPLICATION(S): 213 SOLUTION TOPICAL at 05:59

## 2022-07-02 RX ADMIN — HEPARIN SODIUM 5000 UNIT(S): 5000 INJECTION INTRAVENOUS; SUBCUTANEOUS at 08:03

## 2022-07-02 RX ADMIN — HEPARIN SODIUM 5000 UNIT(S): 5000 INJECTION INTRAVENOUS; SUBCUTANEOUS at 23:11

## 2022-07-02 RX ADMIN — MYCOPHENOLATE MOFETIL 1000 MILLIGRAM(S): 250 CAPSULE ORAL at 08:03

## 2022-07-02 RX ADMIN — Medication 500000 UNIT(S): at 12:07

## 2022-07-02 RX ADMIN — Medication 60 MILLIGRAM(S): at 05:59

## 2022-07-02 RX ADMIN — PANTOPRAZOLE SODIUM 40 MILLIGRAM(S): 20 TABLET, DELAYED RELEASE ORAL at 05:59

## 2022-07-02 RX ADMIN — Medication 1: at 12:30

## 2022-07-02 RX ADMIN — INSULIN GLARGINE 12 UNIT(S): 100 INJECTION, SOLUTION SUBCUTANEOUS at 21:59

## 2022-07-02 RX ADMIN — Medication 100 MILLIGRAM(S): at 19:39

## 2022-07-02 RX ADMIN — Medication 10 MILLIGRAM(S): at 06:04

## 2022-07-02 RX ADMIN — Medication 15 MILLIGRAM(S): at 08:04

## 2022-07-02 RX ADMIN — Medication 15 MILLIGRAM(S): at 19:37

## 2022-07-02 RX ADMIN — TACROLIMUS 3 MILLIGRAM(S): 5 CAPSULE ORAL at 19:38

## 2022-07-02 RX ADMIN — Medication 1 TABLET(S): at 12:08

## 2022-07-02 NOTE — PROGRESS NOTE ADULT - ASSESSMENT
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 5/20 with near syncope in setting of worsening HF symptoms and found to have 41 episodes of VT, many terminating with ATP. LHC did not reveal new obstructive CAD and he underwent EPS which did not reveal endocardial substrate amenable for ablation. RHC revealed severely depressed cardiac output and he was transferred to [facility] 5/26 for advanced therapies evaluation.    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 He improved significantly with sequential uptitration of inotropes and increased pacing rate, but on 6/6 he had worsening JAGRUTI. He is s/p RHC which revealed severely elevated filling pressures, severe cpcPH, and CI of 1.9 on milrinone 0.5. IABP was placed and his renal function/PAPs have improved. He is MCS dependent and was inadequately supported with high dose inotropes, so was listed UNOS status 2e for OHT, awaiting suitable donor. However, was made status 7 as he became febrile, last 6/7 T 38. He has been afebrile since and blood cultures from 6/7 with NGTD x 48 hours and his leukocytosis has not worsened. Discussed with transplant ID and since bld cx negative x48hrs he has been re-listed UNOS status 2e.     A suitable donor was identified and patient underwent OHT with Dr. Earl/Maria C on 6/21 (ischemic time 261 mins, CMV +/+, Toxo -/-). Patient was successfully extubated and IABP was removed on POD 1. Off Maricruz. Cultures from his ICD site in the OR returned positive for staph epidermidis/ morganella morganii for which is being treated with IV Vancomycin.   He's overall progressing well. His Cr was downtrending with diuresis and with less output from mediastinal drain but slight rise in Cr today so will reduce diuretics. He is nearing readiness for discharge pending removal of his CT. Next EMBx planned for 7/5/22.       RHC/EMBx  6/28: RA 9, RV 3/11, PA 62/27/41, wedge 21 with v wave 31, PA sat 69.3%, /81/102, CO/Ci 6.87/3.62, grade 1R/2    Review of studies  TTE 5/21: LV 5.2 cm, LVEF 10-15%, LVOT VTI 10 cm, moderate RV dysfunction, mild AI, minimal MR  EKG: a-BiV paced  LHC 5/23: patent mLAD stent with slow flow, D1 with 40-50% stenosis, mild disease otherwise  RHC 5/26: RA 12, PA 70/40 (50), PCWP 22, Milana CO/CI 2.3/1.3, MAP 83 with SVR 2469, PVR 12 PERSAUD         Problem/Plan - 1:  ·  Problem: S/P orthotopic heart transplant.   ·  Plan: - s/p OHT with Dr. Earl/Maria C on 6/21 (ischemic time 261 min)  - IABP removed on POD 1  - sildenafil 10 mg PO TID   - continue Lasix 40 mg PO BID   - Increase procardia to 90mg and d/c hydralazine   - next RHC/EMBx schedule for Tuesday 7/5.      Problem/Plan - 2:  ·  Problem: Immunosuppression.   ·  Plan: - will continue to adjust tacro as needed for goal 12-14  - Prednisone taper   - remains on cellcept 1000 mg PO BID.     Problem/Plan - 3:  ·  Problem: Prophylactic antibiotic.   ·  Plan: - CMV +/+, on Valcyte 450 mg PO BID   - Toxo -/-, no ppx needed, remains on Mepron for PJP ppx  - periop antibiotics per CTS.     Problem/Plan - 4:  ·  Problem: JAGRUTI (acute kidney injury).   ·  Plan: - renal function is overall improving  - diuresis as needed as stated above  - cardiorenal consulted, appreciate recommendations.     Problem/Plan - 5:  ·  Problem: Gout.   ·  Plan: - will resume allopurinol when feasible   - s/p course of prednisone.     Problem/Plan - 6:  ·  Problem: Drop in hemoglobin.   ·  Plan: - currently improving  - no signs of active bleeding  - continue to trend daily.     Problem/Plan - 7:  ·  Problem: Leukocytosis.   ·  Plan: - postop course c/b leukocytosis, overall improving   - ICD pocket culture positive for staph epidermidis/ morganella morganii  - remains on Cefepime and Vanco IV. If patient is discharged home tomorrow will be discharged home on oral abx   - ID following.   67 YO M with a history of ACC/AHA Stage D mixed NICM/ICM (likely familial with strong FH and early arrhythmia history in his 30's) with LVED 5.2 cm and LVEF 10-15% s/p PPM upgraded to CRT-D, CAD s/p PCI to mLAD 4/2022, well controlled DM2 (A1c 6.2%), and CKD III (Cr 1.4) who initially presented to [facility] 5/20 with near syncope in setting of worsening HF symptoms and found to have 41 episodes of VT, many terminating with ATP. LHC did not reveal new obstructive CAD and he underwent EPS which did not reveal endocardial substrate amenable for ablation. RHC revealed severely depressed cardiac output and he was transferred to [facility] 5/26 for advanced therapies evaluation.    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 He improved significantly with sequential uptitration of inotropes and increased pacing rate, but on 6/6 he had worsening JAGRUTI. He is s/p RHC which revealed severely elevated filling pressures, severe cpcPH, and CI of 1.9 on milrinone 0.5. IABP was placed and his renal function/PAPs have improved. He is MCS dependent and was inadequately supported with high dose inotropes, so was listed UNOS status 2e for OHT, awaiting suitable donor. However, was made status 7 as he became febrile, last 6/7 T 38. He has been afebrile since and blood cultures from 6/7 with NGTD x 48 hours and his leukocytosis has not worsened. Discussed with transplant ID and since bld cx negative x48hrs he has been re-listed UNOS status 2e.     A suitable donor was identified and patient underwent OHT with Dr. Earl/Maria C on 6/21 (ischemic time 261 mins, CMV +/+, Toxo -/-). Patient was successfully extubated and IABP was removed on POD 1. Off Maricruz. Cultures from his ICD site in the OR returned positive for staph epidermidis/ morganella morganii for which is being treated with IV Vancomycin.   He's overall progressing well. His Cr was downtrending with diuresis and with less output from chest tube but slight rise in Cr today so will reduce diuretics. He is nearing readiness for discharge pending removal of his CT. Next EMBx planned for 7/5/22.       RHC/EMBx  6/28: RA 9, RV 3/11, PA 62/27/41, wedge 21 with v wave 31, PA sat 69.3%, /81/102, CO/Ci 6.87/3.62, grade 1R/2    Review of studies  TTE 5/21: LV 5.2 cm, LVEF 10-15%, LVOT VTI 10 cm, moderate RV dysfunction, mild AI, minimal MR  EKG: a-BiV paced  LHC 5/23: patent mLAD stent with slow flow, D1 with 40-50% stenosis, mild disease otherwise  RHC 5/26: RA 12, PA 70/40 (50), PCWP 22, Milana CO/CI 2.3/1.3, MAP 83 with SVR 2469, PVR 12 PERSAUD

## 2022-07-02 NOTE — PROGRESS NOTE ADULT - PROBLEM SELECTOR PLAN 4
- postop course c/b leukocytosis, stable  - ICD pocket culture positive for staph epidermidis/ morganella morganii  - remains on Cefepime and IV Vancomycin. To complete 2-3 week course post ICD explant. Can switch to Cefpodoxime and Doxy at time of discharge  - further guidance by ID

## 2022-07-02 NOTE — PROGRESS NOTE ADULT - ASSESSMENT
66M Mixed Ischemic/NICM Cardiomyopathy (EF 25-30% at baseline, s/p BIV-ICD), CAD (medically managed MIs in ,, Most recent stent in 2022 to mLAD) presented with multiple near syncope, found to have 41 episodes of VT and admitted initially to cardiac telemetry for further evaluation/management. Ischemic evaluation without new disease and VT ablation without substrate to complete ablation. Transferred from [facility] to [facility] for further management and evaluation for LVAD vs OHT. CT surgery consulted for LVAD/transplant evaluation.   Currently undergoing LVAD/transplant eval  Care per Heart failure and CCU primary team  Preop work up and diagnostics in progress  22 s/pOrthotopic heart transplant, ICD removal, iabp  Post op inotropes /pressors, nitric oxide  /Primacor/insulin  extubated d 1, hi flow   iabp d/c    nitric d/c   s/p biopsy, pw d/c,echo neg effusion  Htn-hydralazine, procardia added increased   JAGRUTI-stable, renal transplant following  hyponatremia, lasix/albumin  L sc thrombus,R cephalic thrombus on heparin sq---> plan to check LE duplex and if + may need full anticoagulation  Insulin infusion remains on  On vanco and cefepime for icd pocket + staph epi and morganella   primacor d/c   Transferred to sdu  As per renal lasix increased 40po bid  Chest tube # 3 d/c   VSS; insulin gttp weaned off overnight;  RSR 80-90; ?d/c med ct today- d/w Dr. Lombardi; ANETTE sharmao neg DVT; tacro level today 10- continue tacro 4 po bid; vanco trough level 5.6- vanco 500 mg iv bid; change to po abx at home   Discharge planning- home ? fri; ck covid pcr; next biopsy scheduled tu Call [facility] cardiology for anticoagulation recs. D/c hydralazine to maximize sildenefil. Hep sq.   D/c one  Ms ct today  Cont bid diuresis   Maintain  ct, if under 100cc can d/c   Hyperkalemia, diet restricted , no furrther intervention, discussed in rounds  Decrease lasix qd .  Ace wrap LE

## 2022-07-02 NOTE — PROGRESS NOTE ADULT - PROBLEM SELECTOR PLAN 6
- Cr improving with diuresis from 1.9 to 1.4 but with slight rise to 1.7 today  - reducing loop diuretics as above  - cardiorenal following

## 2022-07-02 NOTE — PROGRESS NOTE ADULT - PROBLEM SELECTOR PLAN 1
·  Plan: - s/p OHT with Dr. Earl/Maria C on 6/21 (ischemic time 261 min)  - IABP removed on POD 1  - d/c primacor today 6/29  - adjust tacro.   Lasix 40 mg PO BID 6/29  - continue hydralazine 25 mg PO TID and Procardia 60mg PO QD.--7/1 d/c hydralazine

## 2022-07-02 NOTE — PROGRESS NOTE ADULT - PROBLEM SELECTOR PLAN 3
- CMV +/+: Valcyte 450 mg PO BID   - Toxo -/-: no PPX needed  - Mepron for PJP PPX  - periop antibiotics per CTS  - will start CAV PPX when stable

## 2022-07-02 NOTE — PROGRESS NOTE ADULT - PROBLEM SELECTOR PLAN 5
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 Gracia, management with Heparin SQ and repeat surveillance L upper extremity venous duplex in 5-7 days

## 2022-07-02 NOTE — PROGRESS NOTE ADULT - SUBJECTIVE AND OBJECTIVE BOX
VITAL SIGNS    Telemetry:  nsr  st    Vital Signs Last 24 Hrs  T(C): 36.4 (22 @ 12:05), Max: 36.7 (22 @ 14:23)  T(F): 97.6 (22 @ 12:05), Max: 98 (22 @ 14:23)  HR: 103 (22 @ 12:05) (88 - 103)  BP: 112/62 (22 @ 12:05) (98/54 - 123/70)  RR: 18 (22 @ 12:05) (18 - 18)  SpO2: 98% (22 @ 12:05) (94% - 99%)                    @ 07:01  -   @ 07:00  --------------------------------------------------------  IN: 730 mL / OUT: 3360 mL / NET: -2630 mL     @ 07:  -   @ 13:58  --------------------------------------------------------  IN: 120 mL / OUT: 630 mL / NET: -510 mL          Daily     Daily Weight in k.1 (2022 07:25)            CAPILLARY BLOOD GLUCOSE      POCT Blood Glucose.: 171 mg/dL (2022 12:16)  POCT Blood Glucose.: 118 mg/dL (2022 07:23)  POCT Blood Glucose.: 178 mg/dL (2022 20:58)  POCT Blood Glucose.: 258 mg/dL (2022 17:53)            Coumadin    [ ] YES          [x  ]      NO                                   PHYSICAL EXAM        Neurology: alert and oriented x 3, nonfocal, no gross deficits  CV : s1 s2 RRR  Sternal Wound :  CDI , Stable  Lungs: cta  Abdomen: soft, nontender, nondistended, positive bowel sounds, last bowel movement +                      :    voiding        Extremities:   -   edema   /  -   calve tenderness ,             atovaquone  Suspension 1500 milliGRAM(s) Oral daily  chlorhexidine 2% Cloths 1 Application(s) Topical <User Schedule>  heparin   Injectable 5000 Unit(s) SubCutaneous every 8 hours  insulin glargine Injectable (LANTUS) 12 Unit(s) SubCutaneous at bedtime  insulin lispro (ADMELOG) corrective regimen sliding scale   SubCutaneous three times a day before meals  insulin lispro (ADMELOG) corrective regimen sliding scale   SubCutaneous at bedtime  multivitamin 1 Tablet(s) Oral daily  mycophenolate mofetil 1000 milliGRAM(s) Oral every 12 hours  NIFEdipine XL 60 milliGRAM(s) Oral daily  nystatin    Suspension 320637 Unit(s) Oral <User Schedule>  pantoprazole    Tablet 40 milliGRAM(s) Oral before breakfast  polyethylene glycol 3350 17 Gram(s) Oral daily  predniSONE   Tablet 15 milliGRAM(s) Oral every 12 hours  sildenafil (REVATIO) 10 milliGRAM(s) Oral three times a day  tacrolimus 3 milliGRAM(s) Oral every 12 hours  traMADol 25 milliGRAM(s) Oral every 8 hours PRN  valGANciclovir 450 milliGRAM(s) Oral every 12 hours  vancomycin    Solution 125 milliGRAM(s) Oral every 12 hours  vancomycin  IVPB 500 milliGRAM(s) IV Intermittent <User Schedule>                    Physical Therapy Rec:   Home  [  ]   Home w/ PT  [  ]  Rehab  [  ]  Discussed with Cardiothoracic Team at AM rounds.

## 2022-07-02 NOTE — PROGRESS NOTE ADULT - PROBLEM SELECTOR PLAN 1
- s/p OHT with Dr. Earl/Maria C on 6/21 (ischemic time 261 min)  - IABP removed on POD 1  - reduce Lasix to 40 mg PO QD  - sildenafil 10 mg PO TID   - procardia to 60 mg QD  - recommend ACE wrap to lower extremities to promote venous return  - continue to monitor output from chest tube  - next RHC/EMBx 7/5/22

## 2022-07-02 NOTE — PROGRESS NOTE ADULT - NS ATTEND AMEND GEN_ALL_CORE FT
s/p OHT on 6/21  Dec lasix 40 po daily  increase procardia 60mg daily, stop hydralazine   S/p RHC 6/28  RA 9, PA: 62/27(41), PCWP: 21 with V wave 31, PA sat 69% Milana CO/CI: 6.87/3.62, SVR 1094 BP: 141/81(102), PVR: 3.35  TTE with normal LV function, RV dilated    IS: tacrolimus 3mg BID, level 10  EMBx 6/28 1R/2, C4d negative,   daily trough levels  cont cellcept 1gm BID  steroid osmany     OI PPX: CMV +/+, Toxo -/-  cont valcyte , mepron   cont nystatin s/S  will start CAV ppx when stable    ICD pocket fluid cx + for morganella and staph epi  cont vanco and cefepime  follow levels    JAGRUTI  creat  rise today, diuretics as above  cont monitor    ambulate with PT  IS . s/p OHT on 6/21  Dec lasix 40 po daily  increase procardia 60mg daily, stop hydralazine   cont sildenafil 10mg TID due to elevated pulm pressures.  S/p RHC 6/28  RA 9, PA: 62/27(41), PCWP: 21 with V wave 31, PA sat 69% Milana CO/CI: 6.87/3.62, SVR 1094 BP: 141/81(102), PVR: 3.35  TTE with normal LV function, RV dilated    IS: tacrolimus 3mg BID, level 10  EMBx 6/28 1R/2, C4d negative,   daily trough levels  cont cellcept 1gm BID  steroid osmany     OI PPX: CMV +/+, Toxo -/-  cont valcyte , mepron   cont nystatin s/S  will start CAV ppx when stable    ICD pocket fluid cx + for morganella and staph epi  cont vanco and cefepime  follow levels    JAGRUTI  creat  rise today, diuretics as above  cont monitor    ambulate with PT  IS .

## 2022-07-02 NOTE — PROGRESS NOTE ADULT - SUBJECTIVE AND OBJECTIVE BOX
Subjective:  - 24hr UOP 3.3L, net negative 2.6L with receiving Lasix 40 IV BID  - mediastinal drain 110 ml output (70 ml day prior)    Medications:  atovaquone  Suspension 1500 milliGRAM(s) Oral daily  chlorhexidine 2% Cloths 1 Application(s) Topical <User Schedule>  furosemide    Tablet 40 milliGRAM(s) Oral two times a day  heparin   Injectable 5000 Unit(s) SubCutaneous every 8 hours  insulin glargine Injectable (LANTUS) 12 Unit(s) SubCutaneous at bedtime  insulin lispro (ADMELOG) corrective regimen sliding scale   SubCutaneous three times a day before meals  insulin lispro (ADMELOG) corrective regimen sliding scale   SubCutaneous at bedtime  multivitamin 1 Tablet(s) Oral daily  mycophenolate mofetil 1000 milliGRAM(s) Oral every 12 hours  NIFEdipine XL 60 milliGRAM(s) Oral daily  nystatin    Suspension 411676 Unit(s) Oral <User Schedule>  pantoprazole    Tablet 40 milliGRAM(s) Oral before breakfast  polyethylene glycol 3350 17 Gram(s) Oral daily  predniSONE   Tablet 15 milliGRAM(s) Oral every 12 hours  sildenafil (REVATIO) 10 milliGRAM(s) Oral three times a day  tacrolimus 3 milliGRAM(s) Oral every 12 hours  traMADol 25 milliGRAM(s) Oral every 8 hours PRN  valGANciclovir 450 milliGRAM(s) Oral every 12 hours  vancomycin    Solution 125 milliGRAM(s) Oral every 12 hours  vancomycin  IVPB 500 milliGRAM(s) IV Intermittent <User Schedule>      Physical Exam:    Vitals:  Vital Signs Last 24 Hours  T(C): 36.6 (22 @ 07:25), Max: 36.7 (22 @ 14:23)  HR: 90 (22 @ 07:25) (88 - 100)  BP: 123/70 (22 @ 07:25) (98/54 - 123/70)  RR: 18 (22 @ 07:25) (18 - 18)  SpO2: 99% (22 @ 07:25) (94% - 99%)    Weight in k.1 ( @ 07:25)    I&O's Summary    2022 07:01  -  2022 07:00  --------------------------------------------------------  IN: 730 mL / OUT: 3360 mL / NET: -2630 mL    2022 07:01  -  2022 09:57  --------------------------------------------------------  IN: 120 mL / OUT: 30 mL / NET: 90 mL    Tele: SR 80-100s    General: No distress. Comfortable.  HEENT: EOM intact.  Neck: Neck supple. JVP not elevated. No masses  Chest: Clear to auscultation bilaterally  CV: Normal S1 and S2. No murmurs, rub, or gallops. Radial pulses normal.  Abdomen: Soft, non-distended, non-tender  Extremities: +2 pretibial edema  Skin: No rashes or skin breakdown  Neurology: Alert and oriented times three. Sensation intact  Psych: Affect normal    Labs:                        12.5   16.91 )-----------( 344      ( 2022 07:56 )             36.8     07-02    135  |  95<L>  |  60<H>  ----------------------------<  132<H>  5.1   |  27  |  1.74<H>    Ca    10.2      2022 07:56  Phos  3.4     07-02  Mg     1.7         TPro  7.1  /  Alb  4.5  /  TBili  1.5<H>  /  DBili  x   /  AST  19  /  ALT  37  /  AlkPhos  55  [id number]

## 2022-07-02 NOTE — PROGRESS NOTE ADULT - PROBLEM SELECTOR PLAN 2
- Tacrolimus reduced yesterday to 3 mg BID given level of 16.3. Level today is 10.7 (goal of 12-14)  - Prednisone taper, currently on 15 mg BID  - Cellcept 1000 mg PO BID

## 2022-07-03 LAB
ALBUMIN SERPL ELPH-MCNC: 4.1 G/DL — SIGNIFICANT CHANGE UP (ref 3.3–5)
ALP SERPL-CCNC: 57 U/L — SIGNIFICANT CHANGE UP (ref 40–120)
ALT FLD-CCNC: 37 U/L — SIGNIFICANT CHANGE UP (ref 10–45)
ANION GAP SERPL CALC-SCNC: 11 MMOL/L — SIGNIFICANT CHANGE UP (ref 5–17)
APPEARANCE UR: CLEAR — SIGNIFICANT CHANGE UP
AST SERPL-CCNC: 18 U/L — SIGNIFICANT CHANGE UP (ref 10–40)
BACTERIA # UR AUTO: NEGATIVE — SIGNIFICANT CHANGE UP
BASOPHILS # BLD AUTO: 0.03 K/UL — SIGNIFICANT CHANGE UP (ref 0–0.2)
BASOPHILS NFR BLD AUTO: 0.2 % — SIGNIFICANT CHANGE UP (ref 0–2)
BILIRUB SERPL-MCNC: 1.1 MG/DL — SIGNIFICANT CHANGE UP (ref 0.2–1.2)
BILIRUB UR-MCNC: NEGATIVE — SIGNIFICANT CHANGE UP
BUN SERPL-MCNC: 54 MG/DL — HIGH (ref 7–23)
CALCIUM SERPL-MCNC: 9.6 MG/DL — SIGNIFICANT CHANGE UP (ref 8.4–10.5)
CHLORIDE SERPL-SCNC: 96 MMOL/L — SIGNIFICANT CHANGE UP (ref 96–108)
CO2 SERPL-SCNC: 23 MMOL/L — SIGNIFICANT CHANGE UP (ref 22–31)
COLOR SPEC: SIGNIFICANT CHANGE UP
CREAT SERPL-MCNC: 1.38 MG/DL — HIGH (ref 0.5–1.3)
DIFF PNL FLD: NEGATIVE — SIGNIFICANT CHANGE UP
EGFR: 57 ML/MIN/1.73M2 — LOW
EOSINOPHIL # BLD AUTO: 0.03 K/UL — SIGNIFICANT CHANGE UP (ref 0–0.5)
EOSINOPHIL NFR BLD AUTO: 0.2 % — SIGNIFICANT CHANGE UP (ref 0–6)
EPI CELLS # UR: 0 /HPF — SIGNIFICANT CHANGE UP
GLUCOSE BLDC GLUCOMTR-MCNC: 132 MG/DL — HIGH (ref 70–99)
GLUCOSE BLDC GLUCOMTR-MCNC: 134 MG/DL — HIGH (ref 70–99)
GLUCOSE BLDC GLUCOMTR-MCNC: 214 MG/DL — HIGH (ref 70–99)
GLUCOSE BLDC GLUCOMTR-MCNC: 229 MG/DL — HIGH (ref 70–99)
GLUCOSE SERPL-MCNC: 143 MG/DL — HIGH (ref 70–99)
GLUCOSE UR QL: ABNORMAL
HCT VFR BLD CALC: 36.5 % — LOW (ref 39–50)
HGB BLD-MCNC: 12.5 G/DL — LOW (ref 13–17)
HYALINE CASTS # UR AUTO: 1 /LPF — SIGNIFICANT CHANGE UP (ref 0–2)
IMM GRANULOCYTES NFR BLD AUTO: 1.3 % — SIGNIFICANT CHANGE UP (ref 0–1.5)
KETONES UR-MCNC: NEGATIVE — SIGNIFICANT CHANGE UP
LEUKOCYTE ESTERASE UR-ACNC: NEGATIVE — SIGNIFICANT CHANGE UP
LYMPHOCYTES # BLD AUTO: 1.63 K/UL — SIGNIFICANT CHANGE UP (ref 1–3.3)
LYMPHOCYTES # BLD AUTO: 8.5 % — LOW (ref 13–44)
MAGNESIUM SERPL-MCNC: 1.7 MG/DL — SIGNIFICANT CHANGE UP (ref 1.6–2.6)
MCHC RBC-ENTMCNC: 29.3 PG — SIGNIFICANT CHANGE UP (ref 27–34)
MCHC RBC-ENTMCNC: 34.2 GM/DL — SIGNIFICANT CHANGE UP (ref 32–36)
MCV RBC AUTO: 85.7 FL — SIGNIFICANT CHANGE UP (ref 80–100)
MONOCYTES # BLD AUTO: 0.96 K/UL — HIGH (ref 0–0.9)
MONOCYTES NFR BLD AUTO: 5 % — SIGNIFICANT CHANGE UP (ref 2–14)
NEUTROPHILS # BLD AUTO: 16.19 K/UL — HIGH (ref 1.8–7.4)
NEUTROPHILS NFR BLD AUTO: 84.8 % — HIGH (ref 43–77)
NITRITE UR-MCNC: NEGATIVE — SIGNIFICANT CHANGE UP
NRBC # BLD: 0 /100 WBCS — SIGNIFICANT CHANGE UP (ref 0–0)
PH UR: 6.5 — SIGNIFICANT CHANGE UP (ref 5–8)
PHOSPHATE SERPL-MCNC: 2.9 MG/DL — SIGNIFICANT CHANGE UP (ref 2.5–4.5)
PLATELET # BLD AUTO: 355 K/UL — SIGNIFICANT CHANGE UP (ref 150–400)
POTASSIUM SERPL-MCNC: 5.1 MMOL/L — SIGNIFICANT CHANGE UP (ref 3.5–5.3)
POTASSIUM SERPL-SCNC: 5.1 MMOL/L — SIGNIFICANT CHANGE UP (ref 3.5–5.3)
PROT SERPL-MCNC: 6.7 G/DL — SIGNIFICANT CHANGE UP (ref 6–8.3)
PROT UR-MCNC: ABNORMAL
RBC # BLD: 4.26 M/UL — SIGNIFICANT CHANGE UP (ref 4.2–5.8)
RBC # FLD: 18.6 % — HIGH (ref 10.3–14.5)
RBC CASTS # UR COMP ASSIST: 1 /HPF — SIGNIFICANT CHANGE UP (ref 0–4)
SODIUM SERPL-SCNC: 130 MMOL/L — LOW (ref 135–145)
SP GR SPEC: 1.01 — SIGNIFICANT CHANGE UP (ref 1.01–1.02)
TACROLIMUS SERPL-MCNC: 13.6 NG/ML — SIGNIFICANT CHANGE UP
UROBILINOGEN FLD QL: NEGATIVE — SIGNIFICANT CHANGE UP
VANCOMYCIN TROUGH SERPL-MCNC: 10.2 UG/ML — SIGNIFICANT CHANGE UP (ref 10–20)
WBC # BLD: 19.09 K/UL — HIGH (ref 3.8–10.5)
WBC # FLD AUTO: 19.09 K/UL — HIGH (ref 3.8–10.5)
WBC UR QL: 0 /HPF — SIGNIFICANT CHANGE UP (ref 0–5)

## 2022-07-03 PROCEDURE — 99233 SBSQ HOSP IP/OBS HIGH 50: CPT

## 2022-07-03 PROCEDURE — 99232 SBSQ HOSP IP/OBS MODERATE 35: CPT | Mod: 24

## 2022-07-03 PROCEDURE — 99232 SBSQ HOSP IP/OBS MODERATE 35: CPT

## 2022-07-03 PROCEDURE — 71045 X-RAY EXAM CHEST 1 VIEW: CPT | Mod: 26

## 2022-07-03 RX ORDER — INSULIN LISPRO 100/ML
2 VIAL (ML) SUBCUTANEOUS
Refills: 0 | Status: DISCONTINUED | OUTPATIENT
Start: 2022-07-03 | End: 2022-07-08

## 2022-07-03 RX ORDER — MAGNESIUM SULFATE 500 MG/ML
2 VIAL (ML) INJECTION ONCE
Refills: 0 | Status: COMPLETED | OUTPATIENT
Start: 2022-07-03 | End: 2022-07-03

## 2022-07-03 RX ORDER — ACETAMINOPHEN 500 MG
650 TABLET ORAL ONCE
Refills: 0 | Status: COMPLETED | OUTPATIENT
Start: 2022-07-03 | End: 2022-07-03

## 2022-07-03 RX ORDER — LIDOCAINE 4 G/100G
1 CREAM TOPICAL ONCE
Refills: 0 | Status: COMPLETED | OUTPATIENT
Start: 2022-07-03 | End: 2022-07-03

## 2022-07-03 RX ORDER — ROSUVASTATIN CALCIUM 5 MG/1
5 TABLET ORAL EVERY 24 HOURS
Refills: 0 | Status: DISCONTINUED | OUTPATIENT
Start: 2022-07-03 | End: 2022-07-08

## 2022-07-03 RX ORDER — MAGNESIUM OXIDE 400 MG ORAL TABLET 241.3 MG
400 TABLET ORAL
Refills: 0 | Status: DISCONTINUED | OUTPATIENT
Start: 2022-07-03 | End: 2022-07-08

## 2022-07-03 RX ORDER — CEFEPIME 1 G/1
1000 INJECTION, POWDER, FOR SOLUTION INTRAMUSCULAR; INTRAVENOUS EVERY 8 HOURS
Refills: 0 | Status: DISCONTINUED | OUTPATIENT
Start: 2022-07-03 | End: 2022-07-08

## 2022-07-03 RX ADMIN — PANTOPRAZOLE SODIUM 40 MILLIGRAM(S): 20 TABLET, DELAYED RELEASE ORAL at 05:06

## 2022-07-03 RX ADMIN — Medication 650 MILLIGRAM(S): at 20:16

## 2022-07-03 RX ADMIN — Medication 2 UNIT(S): at 17:40

## 2022-07-03 RX ADMIN — Medication 2: at 17:39

## 2022-07-03 RX ADMIN — Medication 125 MILLIGRAM(S): at 08:00

## 2022-07-03 RX ADMIN — HEPARIN SODIUM 5000 UNIT(S): 5000 INJECTION INTRAVENOUS; SUBCUTANEOUS at 08:11

## 2022-07-03 RX ADMIN — Medication 500000 UNIT(S): at 12:41

## 2022-07-03 RX ADMIN — Medication 1 TABLET(S): at 12:40

## 2022-07-03 RX ADMIN — Medication 100 MILLIGRAM(S): at 19:34

## 2022-07-03 RX ADMIN — LIDOCAINE 1 PATCH: 4 CREAM TOPICAL at 03:08

## 2022-07-03 RX ADMIN — HEPARIN SODIUM 5000 UNIT(S): 5000 INJECTION INTRAVENOUS; SUBCUTANEOUS at 16:00

## 2022-07-03 RX ADMIN — CHLORHEXIDINE GLUCONATE 1 APPLICATION(S): 213 SOLUTION TOPICAL at 10:15

## 2022-07-03 RX ADMIN — Medication 15 MILLIGRAM(S): at 19:32

## 2022-07-03 RX ADMIN — Medication 100 MILLIGRAM(S): at 08:27

## 2022-07-03 RX ADMIN — Medication 10 MILLIGRAM(S): at 05:35

## 2022-07-03 RX ADMIN — Medication 25 GRAM(S): at 11:14

## 2022-07-03 RX ADMIN — Medication 650 MILLIGRAM(S): at 03:08

## 2022-07-03 RX ADMIN — MYCOPHENOLATE MOFETIL 1000 MILLIGRAM(S): 250 CAPSULE ORAL at 08:00

## 2022-07-03 RX ADMIN — MAGNESIUM OXIDE 400 MG ORAL TABLET 400 MILLIGRAM(S): 241.3 TABLET ORAL at 17:38

## 2022-07-03 RX ADMIN — Medication 10 MILLIGRAM(S): at 21:54

## 2022-07-03 RX ADMIN — MYCOPHENOLATE MOFETIL 1000 MILLIGRAM(S): 250 CAPSULE ORAL at 19:30

## 2022-07-03 RX ADMIN — Medication 650 MILLIGRAM(S): at 03:24

## 2022-07-03 RX ADMIN — VALGANCICLOVIR 450 MILLIGRAM(S): 450 TABLET, FILM COATED ORAL at 08:00

## 2022-07-03 RX ADMIN — CEFEPIME 100 MILLIGRAM(S): 1 INJECTION, POWDER, FOR SOLUTION INTRAMUSCULAR; INTRAVENOUS at 13:34

## 2022-07-03 RX ADMIN — ROSUVASTATIN CALCIUM 5 MILLIGRAM(S): 5 TABLET ORAL at 19:33

## 2022-07-03 RX ADMIN — Medication 40 MILLIGRAM(S): at 05:06

## 2022-07-03 RX ADMIN — Medication 650 MILLIGRAM(S): at 20:05

## 2022-07-03 RX ADMIN — Medication 125 MILLIGRAM(S): at 19:30

## 2022-07-03 RX ADMIN — Medication 15 MILLIGRAM(S): at 08:00

## 2022-07-03 RX ADMIN — Medication 10 MILLIGRAM(S): at 13:09

## 2022-07-03 RX ADMIN — TRAMADOL HYDROCHLORIDE 25 MILLIGRAM(S): 50 TABLET ORAL at 02:00

## 2022-07-03 RX ADMIN — Medication 500000 UNIT(S): at 19:30

## 2022-07-03 RX ADMIN — MAGNESIUM OXIDE 400 MG ORAL TABLET 400 MILLIGRAM(S): 241.3 TABLET ORAL at 12:41

## 2022-07-03 RX ADMIN — CEFEPIME 100 MILLIGRAM(S): 1 INJECTION, POWDER, FOR SOLUTION INTRAMUSCULAR; INTRAVENOUS at 21:54

## 2022-07-03 RX ADMIN — Medication 500000 UNIT(S): at 08:00

## 2022-07-03 RX ADMIN — LIDOCAINE 1 PATCH: 4 CREAM TOPICAL at 14:30

## 2022-07-03 RX ADMIN — Medication 2: at 12:38

## 2022-07-03 RX ADMIN — VALGANCICLOVIR 450 MILLIGRAM(S): 450 TABLET, FILM COATED ORAL at 19:33

## 2022-07-03 RX ADMIN — INSULIN GLARGINE 12 UNIT(S): 100 INJECTION, SOLUTION SUBCUTANEOUS at 21:54

## 2022-07-03 RX ADMIN — TRAMADOL HYDROCHLORIDE 25 MILLIGRAM(S): 50 TABLET ORAL at 01:37

## 2022-07-03 RX ADMIN — Medication 500000 UNIT(S): at 16:00

## 2022-07-03 RX ADMIN — Medication 60 MILLIGRAM(S): at 05:06

## 2022-07-03 RX ADMIN — TACROLIMUS 3 MILLIGRAM(S): 5 CAPSULE ORAL at 08:00

## 2022-07-03 RX ADMIN — ATOVAQUONE 1500 MILLIGRAM(S): 750 SUSPENSION ORAL at 12:41

## 2022-07-03 RX ADMIN — LIDOCAINE 1 PATCH: 4 CREAM TOPICAL at 06:31

## 2022-07-03 RX ADMIN — TACROLIMUS 3 MILLIGRAM(S): 5 CAPSULE ORAL at 19:31

## 2022-07-03 NOTE — PROGRESS NOTE ADULT - PROBLEM SELECTOR PLAN 4
- postop course c/b leukocytosis  - ICD pocket culture positive for staph epidermidis/ morganella morganii  - remains on Cefepime and IV Vancomycin. To complete 2-3 week course post ICD explant. Can switch to Cefpodoxime and Doxy at time of discharge per ID  - worsening leukocytosis though remains afebrile. PAN culture

## 2022-07-03 NOTE — PROGRESS NOTE ADULT - SUBJECTIVE AND OBJECTIVE BOX
Diabetes Follow up note:    Chief complaint: T2DM w/steroid induced hyperglycemia    Interval Hx: BG values 130-low 200s. Pt seen at bedside. Reports feeling well and tolerating POs w/good appetite. Was diagnosed w/DM one week prior to admission. Does not have a glucometer at home. Has started to inject insulin and work w/staff. Next biopsy scheduled for Tuesday w/dc planning this week.     Review of Systems:  General: no complaints.   GI: Tolerating POs. Denies N/V/D/Abd pain  CV: Denies CP/SOB  ENDO: No S&Sx of hypoglycemia    MEDS:  insulin glargine Injectable (LANTUS) 12 Unit(s) SubCutaneous at bedtime  insulin lispro (ADMELOG) corrective regimen sliding scale   SubCutaneous three times a day before meals  insulin lispro (ADMELOG) corrective regimen sliding scale   SubCutaneous at bedtime  predniSONE   Tablet 15 milliGRAM(s) Oral every 12 hours  rosuvastatin 5 milliGRAM(s) Oral every 24 hours    atovaquone  Suspension 1500 milliGRAM(s) Oral daily  cefepime   IVPB 1000 milliGRAM(s) IV Intermittent every 8 hours  nystatin    Suspension 525199 Unit(s) Oral <User Schedule>  valGANciclovir 450 milliGRAM(s) Oral every 12 hours  vancomycin    Solution 125 milliGRAM(s) Oral every 12 hours  vancomycin  IVPB 500 milliGRAM(s) IV Intermittent <User Schedule>    Allergies    No Known Allergies        PE:  General: Male sitting in chair. NAD.   Vital Signs Last 24 Hrs  T(C): 36.6 (03 Jul 2022 11:29), Max: 36.9 (02 Jul 2022 15:38)  T(F): 97.9 (03 Jul 2022 11:29), Max: 98.4 (02 Jul 2022 15:38)  HR: 91 (03 Jul 2022 11:29) (86 - 97)  BP: 102/60 (03 Jul 2022 11:29) (102/60 - 122/74)  BP(mean): 75 (03 Jul 2022 11:29) (75 - 93)  RR: 18 (03 Jul 2022 11:29) (18 - 18)  SpO2: 97% (03 Jul 2022 11:29) (93% - 98%)  Chest: midsternal incision c/d/i  Abd: Soft, NT,ND,   Extremities: Warm  Neuro: A&O X3    LABS:  POCT Blood Glucose.: 214 mg/dL (07-03-22 @ 11:59)  POCT Blood Glucose.: 134 mg/dL (07-03-22 @ 07:08)  POCT Blood Glucose.: 194 mg/dL (07-02-22 @ 21:10)  POCT Blood Glucose.: 175 mg/dL (07-02-22 @ 17:39)  POCT Blood Glucose.: 171 mg/dL (07-02-22 @ 12:16)  POCT Blood Glucose.: 118 mg/dL (07-02-22 @ 07:23)  POCT Blood Glucose.: 178 mg/dL (07-01-22 @ 20:58)  POCT Blood Glucose.: 258 mg/dL (07-01-22 @ 17:53)  POCT Blood Glucose.: 129 mg/dL (07-01-22 @ 12:16)  POCT Blood Glucose.: 147 mg/dL (07-01-22 @ 07:20)  POCT Blood Glucose.: 222 mg/dL (06-30-22 @ 20:49)  POCT Blood Glucose.: 238 mg/dL (06-30-22 @ 16:40)                            12.5   19.09 )-----------( 355      ( 03 Jul 2022 07:27 )             36.5       07-03    130<L>  |  96  |  54<H>  ----------------------------<  143<H>  5.1   |  23  |  1.38<H>    eGFR: 57<L>    Ca    9.6      07-03  Mg     1.7     07-03  Phos  2.9     07-03    TPro  6.7  /  Alb  4.1  /  TBili  1.1  /  DBili  x   /  AST  18  /  ALT  37  /  AlkPhos  57  07-03      Thyroid Function Tests:  06-22 @ 21:13 TSH 0.57 FreeT4 -- T3 -- Anti TPO -- Anti Thyroglobulin Ab -- TSI --      A1C with Estimated Average Glucose Result: 6.2 % (05-20-22 @ 14:17)  A1C with Estimated Average Glucose Result: 6.9 % (04-18-22 @ 15:40)  A1C with Estimated Average Glucose Result: 6.7 % (04-18-22 @ 15:05)          Contact number: beeper [phone] or [phone]

## 2022-07-03 NOTE — PROGRESS NOTE ADULT - SUBJECTIVE AND OBJECTIVE BOX
VITAL SIGNS-Telemetry:  SR   Vital Signs Last 24 Hrs  T(C): 36.8 (22 @ 03:05), Max: 36.9 (22 @ 15:38)  T(F): 98.2 (22 @ 03:05), Max: 98.4 (22 @ 15:38)  HR: 86 (22 @ 03:05) (86 - 103)  BP: 117/74 (22 @ 03:05) (106/58 - 123/70)  RR: 18 (22 @ 03:05) (18 - 18)  SpO2: 98% (22 @ 03:05) (93% - 99%)          @ 07:01  -   @ 07:00  --------------------------------------------------------  IN: 900 mL / OUT: 3040 mL / NET: -2140 mL    Daily     Daily Weight in k.1 (2022 07:25)    CAPILLARY BLOOD GLUCOSE  POCT Blood Glucose.: 194 mg/dL (2022 21:10)  POCT Blood Glucose.: 175 mg/dL (2022 17:39)  POCT Blood Glucose.: 171 mg/dL (2022 12:16)  POCT Blood Glucose.: 118 mg/dL (2022 07:23)        Drains:     MS   #2      [  ] Drainage:   10/40 total       PHYSICAL EXAM:  Neurology: alert and oriented x 3, nonfocal, no gross deficits  CV : S1S2  Sternal Wound :  CDI , Stable  Lungs: cta  Abdomen: soft, nontender, nondistended, positive bowel sounds, last bowel movement         Extremities:     no c/c/e    atovaquone  Suspension 1500 milliGRAM(s) Oral daily  chlorhexidine 2% Cloths 1 Application(s) Topical <User Schedule>  furosemide    Tablet 40 milliGRAM(s) Oral daily  heparin   Injectable 5000 Unit(s) SubCutaneous every 8 hours  insulin glargine Injectable (LANTUS) 12 Unit(s) SubCutaneous at bedtime  insulin lispro (ADMELOG) corrective regimen sliding scale   SubCutaneous three times a day before meals  insulin lispro (ADMELOG) corrective regimen sliding scale   SubCutaneous at bedtime  multivitamin 1 Tablet(s) Oral daily  mycophenolate mofetil 1000 milliGRAM(s) Oral every 12 hours  NIFEdipine XL 60 milliGRAM(s) Oral daily  nystatin    Suspension 989636 Unit(s) Oral <User Schedule>  pantoprazole    Tablet 40 milliGRAM(s) Oral before breakfast  polyethylene glycol 3350 17 Gram(s) Oral daily  predniSONE   Tablet 15 milliGRAM(s) Oral every 12 hours  sildenafil (REVATIO) 10 milliGRAM(s) Oral three times a day  tacrolimus 3 milliGRAM(s) Oral every 12 hours  traMADol 25 milliGRAM(s) Oral every 8 hours PRN  valGANciclovir 450 milliGRAM(s) Oral every 12 hours  vancomycin    Solution 125 milliGRAM(s) Oral every 12 hours  vancomycin  IVPB 500 milliGRAM(s) IV Intermittent <User Schedule>    Physical Therapy Rec:   Home  [x  ]   Home w/ PT  [  ]  Rehab  [  ]  Discussed with Cardiothoracic Team at AM rounds.

## 2022-07-03 NOTE — PROGRESS NOTE ADULT - PROBLEM SELECTOR PLAN 2
- adjustment of Tacrolimus for goal of 12-14  - Prednisone taper, currently on 15 mg BID  - Cellcept 1000 mg PO BID

## 2022-07-03 NOTE — PROGRESS NOTE ADULT - PROBLEM SELECTOR PLAN 3
Recommendations:   - LDL goal < 70   - defer to primary team   - outpatient fasting lipid profile       discussed w/pt and RN  Can be reached via TEAMS/pager: [phone]   office:  [phone] (M-F 9a-5pm)               [phone] (nights/weekends)   [email or web address] password [email or web address]

## 2022-07-03 NOTE — PROGRESS NOTE ADULT - PROBLEM SELECTOR PLAN 2
Recommendations:   - outpt BP goal < 130/80   - defer to primary team   - outpatient annual urinary microalb/cr ratio

## 2022-07-03 NOTE — PROGRESS NOTE ADULT - NS ATTEND AMEND GEN_ALL_CORE FT
s/p OHT on 6/21  cont lasix 40 po daily  cont procardia 60mg daily, stop hydralazine   cont sildenafil 10mg TID due to elevated pulm pressures.  S/p RHC 6/28  RA 9, PA: 62/27(41), PCWP: 21 with V wave 31, PA sat 69% Milana CO/CI: 6.87/3.62, SVR 1094 BP: 141/81(102), PVR: 3.35  TTE with normal LV function, RV dilated    IS: tacrolimus 3mg BID, level 13.6  EMBx 6/28 1R/2, C4d negative,   daily trough levels  cont cellcept 1gm BID  steroid osmany     OI PPX: CMV +/+, Toxo -/-  cont valcyte , mepron   cont nystatin s/S  will startcrestor 5mg po daily, wait for aspirin    ICD pocket fluid cx + for morganella and staph epi  cont vanco   Cefepime fell of after yesterday morning. Will resume  Will do davis culture given rise in WBC  ID follow up  follow levels    Endo:  on Lantus/ lispro  insulin teacing  endo follow up    JAGRUTI  creat  back to baseline  cont monitor    ambulate with PT  IS .

## 2022-07-03 NOTE — PROGRESS NOTE ADULT - PROBLEM SELECTOR PLAN 1
-test BG AC/HS  -c/w Lantus 12 units QHS  -Start Admelog 2 units AC meals  -c/w Admelog low correction scale AC and low HS scale  -cons carb diet  -insulin and glucometer teaching  -notify endocrine team if steroid dose changed    DC Planning: oral regimen vs basal/bolus insulin pens, tbd, depending on insulin requirements & steroid doses at the time of discharge.   Please send test scripts for basal insulin pen (ie. Basaglar, Lantus, Tresiba, Toujeo, Levemir) and bolus insulin pen(ie. humalog/novolog/admelog) to check for insurance coverage Please send prescriptions for diabetes supplies (glucometer, test strips, lancets, alcohol swabs, insulin pen needles). Routine outpatient opthalmology & podiatry evaluations recommended. Patient can follow up with endocrinology at the location provided below:     [facility]   [address] [place] [address] [id number] [phone]

## 2022-07-03 NOTE — PROGRESS NOTE ADULT - NSPROGADDITIONALINFOA_GEN_ALL_CORE
D/W RAMSES Lindsey
27 minutes spent on the development of plan of care/coordination of care/glycemic control through review of labs, blood glucose values and vital signs.

## 2022-07-03 NOTE — PROGRESS NOTE ADULT - ASSESSMENT
66M male with PMHx HTN, HLD, type 2 DM, chronic HFrEF,  (EF 25-30% by Echo) who underwent OHT on 6/21/22, currently on high dose steroid taper. Endocrinology consulted for assistance with management of steroid induced hyperglycemia/dm2. BG values slightly above goal on basal + correctional regimen. On Prednisone 15mg BID at this time. Will need to evaluate insulin requirements as steroids tapered to inform final dc plan. Insulin teaching ongoing. BG goal (100-180mg/dl).

## 2022-07-03 NOTE — PROGRESS NOTE ADULT - ASSESSMENT
66M Mixed Ischemic/NICM Cardiomyopathy (EF 25-30% at baseline, s/p BIV-ICD), CAD (medically managed MIs in ,, Most recent stent in 2022 to mLAD) presented with multiple near syncope, found to have 41 episodes of VT and admitted initially to cardiac telemetry for further evaluation/management. Ischemic evaluation without new disease and VT ablation without substrate to complete ablation. Transferred from [facility] to [facility] for further management and evaluation for LVAD vs OHT. CT surgery consulted for LVAD/transplant evaluation.   Currently undergoing LVAD/transplant eval  Care per Heart failure and CCU primary team  Preop work up and diagnostics in progress  22 s/pOrthotopic heart transplant, ICD removal, iabp  Post op inotropes /pressors, nitric oxide  /Primacor/insulin  extubated d 1, hi flow   iabp d/c    nitric d/c   s/p biopsy, pw d/c,echo neg effusion  Htn-hydralazine, procardia added increased   JAGRUTI-stable, renal transplant following  hyponatremia, lasix/albumin  L sc thrombus,R cephalic thrombus on heparin sq---> plan to check LE duplex and if + may need full anticoagulation  Insulin infusion remains on  On vanco and cefepime for icd pocket + staph epi and morganella   primacor d/c   Transferred to sdu  As per renal lasix increased 40po bid  Chest tube # 3 d/c   VSS; insulin gttp weaned off overnight;  RSR 80-90; ?d/c med ct today- d/w Dr. Lombardi; LE sono neg DVT; tacro level today 10- continue tacro 4 po bid; vanco trough level 5.6- vanco 500 mg iv bid; change to po abx at home   Discharge planning- home ? fri; ck covid pcr; next biopsy scheduled  Call [facility] cardiology for anticoagulation recs. D/c hydralazine to maximize sildenefil. Hep sq.   D/c one  Ms ct today  Cont bid diuresis   Maintain  ct, if under 100cc can d/c   Hyperkalemia, diet restricted , no furrther intervention, discussed in rounds  Decrease lasix qd ./Ace wrap LE  7/3 VSS awaiting labwork- d/c plan home - will round with HF team today 66M Mixed Ischemic/NICM Cardiomyopathy (EF 25-30% at baseline, s/p BIV-ICD), CAD (medically managed MIs in ,, Most recent stent in 2022 to mLAD) presented with multiple near syncope, found to have 41 episodes of VT and admitted initially to cardiac telemetry for further evaluation/management. Ischemic evaluation without new disease and VT ablation without substrate to complete ablation. Transferred from [facility] to [facility] for further management and evaluation for LVAD vs OHT. CT surgery consulted for LVAD/transplant evaluation.   Currently undergoing LVAD/transplant eval  Care per Heart failure and CCU primary team  Preop work up and diagnostics in progress  22 s/pOrthotopic heart transplant, ICD removal, iabp  Post op inotropes /pressors, nitric oxide  /Primacor/insulin  extubated d 1, hi flow   iabp d/c    nitric d/c   s/p biopsy, pw d/c,echo neg effusion  Htn-hydralazine, procardia added increased   JAGRUTI-stable, renal transplant following  hyponatremia, lasix/albumin  L sc thrombus,R cephalic thrombus on heparin sq---> plan to check LE duplex and if + may need full anticoagulation  Insulin infusion remains on  On vanco and cefepime for icd pocket + staph epi and morganella   primacor d/c   Transferred to sdu  As per renal lasix increased 40po bid  Chest tube # 3 d/c   VSS; insulin gttp weaned off overnight;  RSR 80-90; ?d/c med ct today- d/w Dr. Lombardi; LE sono neg DVT; tacro level today 10- continue tacro 4 po bid; vanco trough level 5.6- vanco 500 mg iv bid; change to po abx at home   Discharge planning- home ? fri; ck covid pcr; next biopsy scheduled  Call [facility] cardiology for anticoagulation recs. D/c hydralazine to maximize sildenefil. Hep sq.   D/c one  Ms ct today  Cont bid diuresis   Maintain  ct, if under 100cc can d/c   Hyperkalemia, diet restricted , no furrther intervention, discussed in rounds  Decrease lasix qd ./Ace wrap LE  7/3 VSS WBC up to 19,000 - Mg 1.7 - HF rounds made - plan: mg 2gm ivss x1, start mg ox 400mg po tid. bc x 2 & u/a for leukocytosis. d/c plan ? end of week -  RHC/biospy

## 2022-07-03 NOTE — PROGRESS NOTE ADULT - PROBLEM SELECTOR PLAN 3
continue postop care  ?d/c last two mediastinal ct today -d/w Dr. Maria C carver dosing daily as per level  medication regimen as per transplant team   continue vanco/ cefepime for AICD pocket infection  increase activity as tolerated  LE santa neg dvt today 6/30  discharge planning-home ? fri - ck covid pcr in am fri   next cardiac biopsy scheduled 7/5 tue

## 2022-07-03 NOTE — PROGRESS NOTE ADULT - PROBLEM SELECTOR PLAN 1
- s/p OHT with Dr. Earl/Maria C on 6/21 (ischemic time 261 min)  - IABP removed on POD 1  - continue Lasix to 40 mg PO QD (reduced 7/2)  - sildenafil 10 mg PO TID   - procardia 60 mg QD  - ACE wrap to lower extremities to promote venous return  - 24hr chest tube output 40cc. To be removed today  - next RHC/EMBx 7/5/22

## 2022-07-03 NOTE — PROGRESS NOTE ADULT - SUBJECTIVE AND OBJECTIVE BOX
Subjective:  - no acute events overnight     Medications:  atovaquone  Suspension 1500 milliGRAM(s) Oral daily  chlorhexidine 2% Cloths 1 Application(s) Topical <User Schedule>  furosemide    Tablet 40 milliGRAM(s) Oral daily  heparin   Injectable 5000 Unit(s) SubCutaneous every 8 hours  insulin glargine Injectable (LANTUS) 12 Unit(s) SubCutaneous at bedtime  insulin lispro (ADMELOG) corrective regimen sliding scale   SubCutaneous three times a day before meals  insulin lispro (ADMELOG) corrective regimen sliding scale   SubCutaneous at bedtime  magnesium oxide 400 milliGRAM(s) Oral three times a day with meals  magnesium sulfate  IVPB 2 Gram(s) IV Intermittent once  multivitamin 1 Tablet(s) Oral daily  mycophenolate mofetil 1000 milliGRAM(s) Oral every 12 hours  NIFEdipine XL 60 milliGRAM(s) Oral daily  nystatin    Suspension 237424 Unit(s) Oral <User Schedule>  pantoprazole    Tablet 40 milliGRAM(s) Oral before breakfast  polyethylene glycol 3350 17 Gram(s) Oral daily  predniSONE   Tablet 15 milliGRAM(s) Oral every 12 hours  sildenafil (REVATIO) 10 milliGRAM(s) Oral three times a day  tacrolimus 3 milliGRAM(s) Oral every 12 hours  traMADol 25 milliGRAM(s) Oral every 8 hours PRN  valGANciclovir 450 milliGRAM(s) Oral every 12 hours  vancomycin    Solution 125 milliGRAM(s) Oral every 12 hours  vancomycin  IVPB 500 milliGRAM(s) IV Intermittent <User Schedule>      Physical Exam:    Vitals:  Vital Signs Last 24 Hours  T(C): 36.5 (07-03-22 @ 07:10), Max: 36.9 (07-02-22 @ 15:38)  HR: 88 (07-03-22 @ 07:10) (86 - 103)  BP: 118/66 (07-03-22 @ 07:10) (106/58 - 122/74)  RR: 18 (07-03-22 @ 07:10) (18 - 18)  SpO2: 97% (07-03-22 @ 07:10) (93% - 98%)        I&O's Summary    02 Jul 2022 07:01  -  03 Jul 2022 07:00  --------------------------------------------------------  IN: 900 mL / OUT: 3040 mL / NET: -2140 mL    03 Jul 2022 07:01  -  03 Jul 2022 10:14  --------------------------------------------------------  IN: 100 mL / OUT: 360 mL / NET: -260 mL    Tele: SR 80-90s    General: No distress. Comfortable.  HEENT: EOM intact.  Neck: Neck supple. JVP not elevated. No masses  Chest: Clear to auscultation bilaterally  CV: Normal S1 and S2. No murmurs, rub, or gallops. Radial pulses normal.  Abdomen: Soft, non-distended, non-tender  Skin: No rashes or skin breakdown  Neurology: Alert and oriented times three. Sensation intact  Psych: Affect normal    Labs:                        12.5   19.09 )-----------( 355      ( 03 Jul 2022 07:27 )             36.5     07-03    130<L>  |  96  |  54<H>  ----------------------------<  143<H>  5.1   |  23  |  1.38<H>    Ca    9.6      03 Jul 2022 07:27  Phos  2.9     07-03  Mg     1.7     07-03    TPro  6.7  /  Alb  4.1  /  TBili  1.1  /  DBili  x   /  AST  18  /  ALT  37  /  AlkPhos  57  07-03

## 2022-07-03 NOTE — PROGRESS NOTE ADULT - ASSESSMENT
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 5/20 with near syncope in setting of worsening HF symptoms and found to have 41 episodes of VT, many terminating with ATP. LHC did not reveal new obstructive CAD and he underwent EPS which did not reveal endocardial substrate amenable for ablation. RHC revealed severely depressed cardiac output and he was transferred to [facility] 5/26 for advanced therapies evaluation.    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 He improved significantly with sequential uptitration of inotropes and increased pacing rate, but on 6/6 he had worsening JAGRUTI. He is s/p RHC which revealed severely elevated filling pressures, severe cpcPH, and CI of 1.9 on milrinone 0.5. IABP was placed and his renal function/PAPs have improved. He is MCS dependent and was inadequately supported with high dose inotropes, so was listed UNOS status 2e for OHT, awaiting suitable donor. However, was made status 7 as he became febrile, last 6/7 T 38. He has been afebrile since and blood cultures from 6/7 with NGTD x 48 hours and his leukocytosis has not worsened. Discussed with transplant ID and since bld cx negative x48hrs he has been re-listed UNOS status 2e.     A suitable donor was identified and patient underwent OHT with Dr. Earl/Maria C on 6/21 (ischemic time 261 mins, CMV +/+, Toxo -/-). Patient was successfully extubated and IABP was removed on POD 1. Off Maricruz. Cultures from his ICD site in the OR returned positive for staph epidermidis/ morganella morganii for which is being treated with IV Vancomycin. He's overall progressing well. Cr today improved with reduction of diuretics and chest tube with minimal output. Notably with worsening leukocytosis today, pending PAN culture. Next EMBx planned for 7/5/22. He is nearing readiness for discharge.       RHC/EMBx  6/28: RA 9, RV 3/11, PA 62/27/41, wedge 21 with v wave 31, PA sat 69.3%, /81/102, CO/Ci 6.87/3.62, grade 1R/2    Review of studies  TTE 5/21: LV 5.2 cm, LVEF 10-15%, LVOT VTI 10 cm, moderate RV dysfunction, mild AI, minimal MR  EKG: a-BiV paced  C 5/23: patent mLAD stent with slow flow, D1 with 40-50% stenosis, mild disease otherwise  RHC 5/26: RA 12, PA 70/40 (50), PCWP 22, Milana CO/CI 2.3/1.3, MAP 83 with SVR 2469, PVR 12 PERSAUD

## 2022-07-04 LAB
ALBUMIN SERPL ELPH-MCNC: 4.1 G/DL — SIGNIFICANT CHANGE UP (ref 3.3–5)
ALP SERPL-CCNC: 58 U/L — SIGNIFICANT CHANGE UP (ref 40–120)
ALT FLD-CCNC: 36 U/L — SIGNIFICANT CHANGE UP (ref 10–45)
ANION GAP SERPL CALC-SCNC: 12 MMOL/L — SIGNIFICANT CHANGE UP (ref 5–17)
AST SERPL-CCNC: 18 U/L — SIGNIFICANT CHANGE UP (ref 10–40)
BILIRUB SERPL-MCNC: 1.1 MG/DL — SIGNIFICANT CHANGE UP (ref 0.2–1.2)
BUN SERPL-MCNC: 56 MG/DL — HIGH (ref 7–23)
CALCIUM SERPL-MCNC: 9.7 MG/DL — SIGNIFICANT CHANGE UP (ref 8.4–10.5)
CHLORIDE SERPL-SCNC: 98 MMOL/L — SIGNIFICANT CHANGE UP (ref 96–108)
CMV DNA CSF QL NAA+PROBE: 80 — SIGNIFICANT CHANGE UP
CMV DNA SPEC NAA+PROBE-LOG#: 1.9 LOG10IU/ML — HIGH
CO2 SERPL-SCNC: 24 MMOL/L — SIGNIFICANT CHANGE UP (ref 22–31)
CREAT SERPL-MCNC: 1.37 MG/DL — HIGH (ref 0.5–1.3)
EGFR: 58 ML/MIN/1.73M2 — LOW
GLUCOSE BLDC GLUCOMTR-MCNC: 128 MG/DL — HIGH (ref 70–99)
GLUCOSE BLDC GLUCOMTR-MCNC: 130 MG/DL — HIGH (ref 70–99)
GLUCOSE BLDC GLUCOMTR-MCNC: 155 MG/DL — HIGH (ref 70–99)
GLUCOSE BLDC GLUCOMTR-MCNC: 155 MG/DL — HIGH (ref 70–99)
GLUCOSE BLDC GLUCOMTR-MCNC: 198 MG/DL — HIGH (ref 70–99)
GLUCOSE SERPL-MCNC: 126 MG/DL — HIGH (ref 70–99)
HCT VFR BLD CALC: 38.7 % — LOW (ref 39–50)
HGB BLD-MCNC: 12.8 G/DL — LOW (ref 13–17)
MAGNESIUM SERPL-MCNC: 2.4 MG/DL — SIGNIFICANT CHANGE UP (ref 1.6–2.6)
MCHC RBC-ENTMCNC: 29 PG — SIGNIFICANT CHANGE UP (ref 27–34)
MCHC RBC-ENTMCNC: 33.1 GM/DL — SIGNIFICANT CHANGE UP (ref 32–36)
MCV RBC AUTO: 87.8 FL — SIGNIFICANT CHANGE UP (ref 80–100)
NRBC # BLD: 0 /100 WBCS — SIGNIFICANT CHANGE UP (ref 0–0)
PHOSPHATE SERPL-MCNC: 3 MG/DL — SIGNIFICANT CHANGE UP (ref 2.5–4.5)
PLATELET # BLD AUTO: 341 K/UL — SIGNIFICANT CHANGE UP (ref 150–400)
POTASSIUM SERPL-MCNC: 5.5 MMOL/L — HIGH (ref 3.5–5.3)
POTASSIUM SERPL-SCNC: 5.5 MMOL/L — HIGH (ref 3.5–5.3)
PROT SERPL-MCNC: 6.6 G/DL — SIGNIFICANT CHANGE UP (ref 6–8.3)
RBC # BLD: 4.41 M/UL — SIGNIFICANT CHANGE UP (ref 4.2–5.8)
RBC # FLD: 18.7 % — HIGH (ref 10.3–14.5)
SARS-COV-2 RNA SPEC QL NAA+PROBE: SIGNIFICANT CHANGE UP
SODIUM SERPL-SCNC: 134 MMOL/L — LOW (ref 135–145)
TACROLIMUS SERPL-MCNC: 11.6 NG/ML — SIGNIFICANT CHANGE UP
VANCOMYCIN TROUGH SERPL-MCNC: 11.2 UG/ML — SIGNIFICANT CHANGE UP (ref 10–20)
WBC # BLD: 17.42 K/UL — HIGH (ref 3.8–10.5)
WBC # FLD AUTO: 17.42 K/UL — HIGH (ref 3.8–10.5)

## 2022-07-04 PROCEDURE — 71045 X-RAY EXAM CHEST 1 VIEW: CPT | Mod: 26

## 2022-07-04 PROCEDURE — 99233 SBSQ HOSP IP/OBS HIGH 50: CPT

## 2022-07-04 RX ORDER — HEPARIN SODIUM 5000 [USP'U]/ML
5000 INJECTION INTRAVENOUS; SUBCUTANEOUS ONCE
Refills: 0 | Status: COMPLETED | OUTPATIENT
Start: 2022-07-04 | End: 2022-07-04

## 2022-07-04 RX ORDER — SODIUM ZIRCONIUM CYCLOSILICATE 10 G/10G
10 POWDER, FOR SUSPENSION ORAL ONCE
Refills: 0 | Status: COMPLETED | OUTPATIENT
Start: 2022-07-04 | End: 2022-07-04

## 2022-07-04 RX ORDER — HEPARIN SODIUM 5000 [USP'U]/ML
5000 INJECTION INTRAVENOUS; SUBCUTANEOUS ONCE
Refills: 0 | Status: DISCONTINUED | OUTPATIENT
Start: 2022-07-04 | End: 2022-07-04

## 2022-07-04 RX ADMIN — HEPARIN SODIUM 5000 UNIT(S): 5000 INJECTION INTRAVENOUS; SUBCUTANEOUS at 07:43

## 2022-07-04 RX ADMIN — MAGNESIUM OXIDE 400 MG ORAL TABLET 400 MILLIGRAM(S): 241.3 TABLET ORAL at 12:39

## 2022-07-04 RX ADMIN — Medication 125 MILLIGRAM(S): at 07:43

## 2022-07-04 RX ADMIN — MYCOPHENOLATE MOFETIL 1000 MILLIGRAM(S): 250 CAPSULE ORAL at 19:49

## 2022-07-04 RX ADMIN — Medication 10 MILLIGRAM(S): at 21:07

## 2022-07-04 RX ADMIN — Medication 15 MILLIGRAM(S): at 07:44

## 2022-07-04 RX ADMIN — MAGNESIUM OXIDE 400 MG ORAL TABLET 400 MILLIGRAM(S): 241.3 TABLET ORAL at 07:45

## 2022-07-04 RX ADMIN — Medication 125 MILLIGRAM(S): at 19:49

## 2022-07-04 RX ADMIN — Medication 500000 UNIT(S): at 19:48

## 2022-07-04 RX ADMIN — INSULIN GLARGINE 12 UNIT(S): 100 INJECTION, SOLUTION SUBCUTANEOUS at 22:06

## 2022-07-04 RX ADMIN — CEFEPIME 100 MILLIGRAM(S): 1 INJECTION, POWDER, FOR SOLUTION INTRAMUSCULAR; INTRAVENOUS at 13:50

## 2022-07-04 RX ADMIN — Medication 10 MILLIGRAM(S): at 05:48

## 2022-07-04 RX ADMIN — Medication 2 UNIT(S): at 07:42

## 2022-07-04 RX ADMIN — MAGNESIUM OXIDE 400 MG ORAL TABLET 400 MILLIGRAM(S): 241.3 TABLET ORAL at 17:22

## 2022-07-04 RX ADMIN — Medication 500000 UNIT(S): at 07:43

## 2022-07-04 RX ADMIN — PANTOPRAZOLE SODIUM 40 MILLIGRAM(S): 20 TABLET, DELAYED RELEASE ORAL at 05:45

## 2022-07-04 RX ADMIN — VALGANCICLOVIR 450 MILLIGRAM(S): 450 TABLET, FILM COATED ORAL at 07:44

## 2022-07-04 RX ADMIN — TACROLIMUS 3 MILLIGRAM(S): 5 CAPSULE ORAL at 07:44

## 2022-07-04 RX ADMIN — VALGANCICLOVIR 450 MILLIGRAM(S): 450 TABLET, FILM COATED ORAL at 19:49

## 2022-07-04 RX ADMIN — CEFEPIME 100 MILLIGRAM(S): 1 INJECTION, POWDER, FOR SOLUTION INTRAMUSCULAR; INTRAVENOUS at 05:45

## 2022-07-04 RX ADMIN — MYCOPHENOLATE MOFETIL 1000 MILLIGRAM(S): 250 CAPSULE ORAL at 07:44

## 2022-07-04 RX ADMIN — Medication 40 MILLIGRAM(S): at 05:45

## 2022-07-04 RX ADMIN — SODIUM ZIRCONIUM CYCLOSILICATE 10 GRAM(S): 10 POWDER, FOR SUSPENSION ORAL at 13:50

## 2022-07-04 RX ADMIN — HEPARIN SODIUM 5000 UNIT(S): 5000 INJECTION INTRAVENOUS; SUBCUTANEOUS at 21:08

## 2022-07-04 RX ADMIN — Medication 500000 UNIT(S): at 17:21

## 2022-07-04 RX ADMIN — ROSUVASTATIN CALCIUM 5 MILLIGRAM(S): 5 TABLET ORAL at 19:49

## 2022-07-04 RX ADMIN — Medication 1 TABLET(S): at 12:14

## 2022-07-04 RX ADMIN — TACROLIMUS 3 MILLIGRAM(S): 5 CAPSULE ORAL at 19:49

## 2022-07-04 RX ADMIN — Medication 2 UNIT(S): at 17:49

## 2022-07-04 RX ADMIN — Medication 100 MILLIGRAM(S): at 20:18

## 2022-07-04 RX ADMIN — Medication 500000 UNIT(S): at 12:14

## 2022-07-04 RX ADMIN — Medication 2 UNIT(S): at 12:13

## 2022-07-04 RX ADMIN — Medication 60 MILLIGRAM(S): at 05:46

## 2022-07-04 RX ADMIN — Medication 100 MILLIGRAM(S): at 07:49

## 2022-07-04 RX ADMIN — Medication 10 MILLIGRAM(S): at 13:54

## 2022-07-04 RX ADMIN — CEFEPIME 100 MILLIGRAM(S): 1 INJECTION, POWDER, FOR SOLUTION INTRAMUSCULAR; INTRAVENOUS at 21:07

## 2022-07-04 RX ADMIN — HEPARIN SODIUM 5000 UNIT(S): 5000 INJECTION INTRAVENOUS; SUBCUTANEOUS at 00:30

## 2022-07-04 RX ADMIN — Medication 15 MILLIGRAM(S): at 19:49

## 2022-07-04 RX ADMIN — CHLORHEXIDINE GLUCONATE 1 APPLICATION(S): 213 SOLUTION TOPICAL at 05:49

## 2022-07-04 RX ADMIN — ATOVAQUONE 1500 MILLIGRAM(S): 750 SUSPENSION ORAL at 12:14

## 2022-07-04 RX ADMIN — Medication 1: at 12:11

## 2022-07-04 NOTE — PROGRESS NOTE ADULT - ASSESSMENT
66M Mixed Ischemic/NICM Cardiomyopathy (EF 25-30% at baseline, s/p BIV-ICD), CAD (medically managed MIs in ,, Most recent stent in 2022 to mLAD) presented with multiple near syncope, found to have 41 episodes of VT and admitted initially to cardiac telemetry for further evaluation/management. Ischemic evaluation without new disease and VT ablation without substrate to complete ablation. Transferred from [facility] to [facility] for further management and evaluation for LVAD vs OHT. CT surgery consulted for LVAD/transplant evaluation.   Currently undergoing LVAD/transplant eval  Care per Heart failure and CCU primary team  Preop work up and diagnostics in progress  22 s/pOrthotopic heart transplant, ICD removal, iabp  Post op inotropes /pressors, nitric oxide  /Primacor/insulin  extubated d 1, hi flow   iabp d/c    nitric d/c   s/p biopsy, pw d/c,echo neg effusion  Htn-hydralazine, procardia added increased   JAGRUTI-stable, renal transplant following  hyponatremia, lasix/albumin  L sc thrombus,R cephalic thrombus on heparin sq---> plan to check LE duplex and if + may need full anticoagulation  Insulin infusion remains on  On vanco and cefepime for icd pocket + staph epi and morganella   primacor d/c   Transferred to sdu  As per renal lasix increased 40po bid  Chest tube # 3 d/c   VSS; insulin gttp weaned off overnight;  RSR 80-90; ?d/c med ct today- d/w Dr. Lombardi; LE sono neg DVT; tacro level today 10- continue tacro 4 po bid; vanco trough level 5.6- vanco 500 mg iv bid; change to po abx at home   Discharge planning- home ? fri; ck covid pcr; next biopsy scheduled  Call [facility] cardiology for anticoagulation recs. D/c hydralazine to maximize sildenefil. Hep sq.   D/c one  Ms ct today  Cont bid diuresis   Maintain  ct, if under 100cc can d/c   Hyperkalemia, diet restricted , no furrther intervention, discussed in rounds  Decrease lasix qd ./Ace wrap LE  7/3 VSS WBC up to 19,000 - Mg 1.7 - HF rounds made - plan: mg 2gm ivss x1, start mg ox 400mg po tid. bc x 2 & u/a for leukocytosis. d/c plan ? end of week -  RHC/biospy   VSS - will ck WBC this am - u/a negative - BC testing from yesterday d/t increase WBC- ck covid this am - NPO after midnight for biopsy in am- d/c planning

## 2022-07-04 NOTE — PROGRESS NOTE ADULT - PROBLEM SELECTOR PLAN 1
- s/p OHT with Dr. Earl/Maria C on 6/21 (ischemic time 261 min)  - IABP removed on POD 1  - continue Lasix to 40 mg PO QD (reduced 7/2)  - sildenafil 10 mg PO TID   - procardia 60 mg QD  - ACE wrap to lower extremities to promote venous return  - 24hr chest tube output 40cc. To be removed today  - next RHC/EMBx 7/5/22 - s/p OHT with Dr. Earl/Maria C on 6/21 (ischemic time 261 min)  - IABP removed on POD 1  - continue Lasix to 40 mg PO QD (reduced 7/2)  - sildenafil 10 mg PO TID   - procardia 60 mg QD  - CAV ppx: ASA on discharge. Rosuvastatin 5 mg qHS  - ACE wrap to lower extremities to promote venous return  - next RHC/EMBx 7/5/22

## 2022-07-04 NOTE — PROGRESS NOTE ADULT - PROBLEM SELECTOR PLAN 3
- CMV +/+: Valcyte 450 mg PO BID   - Toxo -/-: no PPX needed  - Mepron for PJP PPX  - periop antibiotics per CTS  - will start CAV PPX when stable - CMV +/+: Valcyte 450 mg PO BID   - Toxo -/-: no PPX needed  - Mepron for PJP PPX  - periop antibiotics per CTS

## 2022-07-04 NOTE — PROGRESS NOTE ADULT - SUBJECTIVE AND OBJECTIVE BOX
Subjective:  - no acute events overnight    Medications:  atovaquone  Suspension 1500 milliGRAM(s) Oral daily  cefepime   IVPB 1000 milliGRAM(s) IV Intermittent every 8 hours  chlorhexidine 2% Cloths 1 Application(s) Topical <User Schedule>  furosemide    Tablet 40 milliGRAM(s) Oral daily  heparin   Injectable 5000 Unit(s) SubCutaneous every 8 hours  insulin glargine Injectable (LANTUS) 12 Unit(s) SubCutaneous at bedtime  insulin lispro (ADMELOG) corrective regimen sliding scale   SubCutaneous three times a day before meals  insulin lispro (ADMELOG) corrective regimen sliding scale   SubCutaneous at bedtime  insulin lispro Injectable (ADMELOG) 2 Unit(s) SubCutaneous three times a day before meals  magnesium oxide 400 milliGRAM(s) Oral three times a day with meals  multivitamin 1 Tablet(s) Oral daily  mycophenolate mofetil 1000 milliGRAM(s) Oral every 12 hours  NIFEdipine XL 60 milliGRAM(s) Oral daily  nystatin    Suspension 124251 Unit(s) Oral <User Schedule>  pantoprazole    Tablet 40 milliGRAM(s) Oral before breakfast  polyethylene glycol 3350 17 Gram(s) Oral daily  predniSONE   Tablet 15 milliGRAM(s) Oral every 12 hours  rosuvastatin 5 milliGRAM(s) Oral every 24 hours  sildenafil (REVATIO) 10 milliGRAM(s) Oral three times a day  tacrolimus 3 milliGRAM(s) Oral every 12 hours  traMADol 25 milliGRAM(s) Oral every 8 hours PRN  valGANciclovir 450 milliGRAM(s) Oral every 12 hours  vancomycin    Solution 125 milliGRAM(s) Oral every 12 hours  vancomycin  IVPB 500 milliGRAM(s) IV Intermittent <User Schedule>      Physical Exam:    Vitals:  Vital Signs Last 24 Hours  T(C): 36.6 (22 @ 07:09), Max: 36.7 (22 @ 15:48)  HR: 91 (22 @ 07:09) (85 - 95)  BP: 127/82 (22 @ 07:09) (102/60 - 127/82)  RR: 18 (22 @ 07:09) (18 - 18)  SpO2: 98% (22 @ 07:09) (96% - 98%)    Weight in k.6 ( @ 07:09)    I&O's Summary    2022 07:01  -  2022 07:00  --------------------------------------------------------  IN: 1040 mL / OUT: 2260 mL / NET: -1220 mL    2022 07:01  -  2022 10:28  --------------------------------------------------------  IN: 0 mL / OUT: 200 mL / NET: -200 mL    Tele: SR 80-90s    General: No distress. Comfortable.  HEENT: EOM intact.  Neck: Neck supple. JVP not elevated. No masses  Chest: Clear to auscultation bilaterally  CV: Normal S1 and S2. No murmurs, rub, or gallops. Radial pulses normal.  Abdomen: Soft, non-distended, non-tender  Skin: No rashes or skin breakdown  Neurology: Alert and oriented times three. Sensation intact  Psych: Affect normal    Labs:                        12.8   17.42 )-----------( 341      ( 2022 07:33 )             38.7     07-04    134<L>  |  98  |  56<H>  ----------------------------<  126<H>  5.5<H>   |  24  |  1.37<H>    Ca    9.7      2022 07:33  Phos  3.0     07-04  Mg     2.4     07-04    TPro  6.6  /  Alb  4.1  /  TBili  1.1  /  DBili  x   /  AST  18  /  ALT  36  /  AlkPhos  58  07-04

## 2022-07-04 NOTE — PROGRESS NOTE ADULT - ASSESSMENT
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 LHC did not reveal new obstructive CAD and he underwent EPS which did not reveal endocardial substrate amenable for ablation. RHC revealed severely depressed cardiac output and he was transferred to [facility] 5/26 for advanced therapies evaluation.    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 He improved significantly with sequential uptitration of inotropes and increased pacing rate, but on 6/6 he had worsening JAGRUTI. He is s/p RHC which revealed severely elevated filling pressures, severe cpcPH, and CI of 1.9 on milrinone 0.5. IABP was placed and his renal function/PAPs have improved. He is MCS dependent and was inadequately supported with high dose inotropes, so was listed UNOS status 2e for OHT, awaiting suitable donor. However, was made status 7 as he became febrile, last 6/7 T 38. He has been afebrile since and blood cultures from 6/7 with NGTD x 48 hours and his leukocytosis has not worsened. Discussed with transplant ID and since bld cx negative x48hrs he has been re-listed UNOS status 2e.     A suitable donor was identified and patient underwent OHT with Dr. Earl/Maria C on 6/21 (ischemic time 261 mins, CMV +/+, Toxo -/-). Patient was successfully extubated and IABP was removed on POD 1. Off Maricruz. Cultures from his ICD site in the OR returned positive for staph epidermidis/ morganella morganii for which is being treated with IV Vancomycin and Cefepime. He's overall progressing well. Exhibited worsening leukocytosis yesterday, PAN culture unrevealing to date and WBC has downtrended today. Next EMBx planned for 7/5/22. He is nearing readiness for discharge.     RHC/EMBx  6/28: RA 9, RV 3/11, PA 62/27/41, wedge 21 with v wave 31, PA sat 69.3%, /81/102, CO/Ci 6.87/3.62, grade 1R/2    Review of studies  TTE 5/21: LV 5.2 cm, LVEF 10-15%, LVOT VTI 10 cm, moderate RV dysfunction, mild AI, minimal MR  EKG: a-BiV paced  C 5/23: patent mLAD stent with slow flow, D1 with 40-50% stenosis, mild disease otherwise  RHC 5/26: RA 12, PA 70/40 (50), PCWP 22, Milana CO/CI 2.3/1.3, MAP 83 with SVR 2469, PVR 12 PERSAUD     65 YO M with a history of ACC/AHA Stage D mixed 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 5/20 with near syncope in setting of worsening HF symptoms and found to have 41 episodes of VT, many terminating with ATP. LHC did not reveal new obstructive CAD and he underwent EPS which did not reveal endocardial substrate amenable for ablation. RHC revealed severely depressed cardiac output and he was transferred to [facility] 5/26 for advanced therapies evaluation.    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 He improved significantly with sequential uptitration of inotropes and increased pacing rate, but on 6/6 he had worsening JAGRUTI. He is s/p RHC which revealed severely elevated filling pressures, severe cpcPH, and CI of 1.9 on milrinone 0.5. IABP was placed and his renal function/PAPs have improved. He is MCS dependent and was inadequately supported with high dose inotropes, so was listed UNOS status 2e for OHT, awaiting suitable donor. However, was made status 7 as he became febrile, last 6/7 T 38. He has been afebrile since and blood cultures from 6/7 with NGTD x 48 hours and his leukocytosis has not worsened. Discussed with transplant ID and since bld cx negative x48hrs he has been re-listed UNOS status 2e.     A suitable donor was identified and patient underwent OHT with Dr. Earl/Maria C on 6/21 (ischemic time 261 mins, CMV +/+, Toxo -/-). Patient was successfully extubated and IABP was removed on POD 1. Off Maricruz. Cultures from his ICD site in the OR returned positive for staph epidermidis/ morganella morganii for which is being treated with IV Vancomycin and Cefepime. He's overall progressing well. Exhibited worsening leukocytosis yesterday, PAN culture unrevealing to date and WBC has downtrended today. Next EMBx planned for 7/5/22. He is nearing readiness for discharge.     RHC/EMBx  6/28: RA 9, RV 3/11, PA 62/27/41, wedge 21 with v wave 31, PA sat 69.3%, /81/102, CO/Ci 6.87/3.62, grade 1R/2      Review of studies  TTE 5/21: LV 5.2 cm, LVEF 10-15%, LVOT VTI 10 cm, moderate RV dysfunction, mild AI, minimal MR  EKG: a-BiV paced  C 5/23: patent mLAD stent with slow flow, D1 with 40-50% stenosis, mild disease otherwise  RHC 5/26: RA 12, PA 70/40 (50), PCWP 22, Milana CO/CI 2.3/1.3, MAP 83 with SVR 2469, PVR 12 PERSAUD

## 2022-07-04 NOTE — PROGRESS NOTE ADULT - NS ATTEND AMEND GEN_ALL_CORE FT
s/p OHT on 6/21  cont lasix 40 po daily  cont procardia 60mg daily, stop hydralazine   cont sildenafil 10mg TID due to elevated pulm pressures.  S/p RHC 6/28  RA 9, PA: 62/27(41), PCWP: 21 with V wave 31, PA sat 69% Milana CO/CI: 6.87/3.62, SVR 1094 BP: 141/81(102), PVR: 3.35  TTE with normal LV function, RV dilated    IS: tacrolimus 3mg BID, level 13.6  EMBx 6/28 1R/2, C4d negative,   daily trough levels  cont cellcept 1gm BID  steroid osmany   Biopsy tomorrow, hold Sq heparin tonight    OI PPX: CMV +/+, Toxo -/-  cont valcyte , mepron   cont nystatin s/S  will cont crestor 5mg po daily, wait for aspirin    ICD pocket fluid cx + for morganella and staph epi  cont vanco and cefepime  pan culture sent pending  Will do pan culture given rise in WBC  ID follow up  follow levels    Endo:  on Lantus/ lispro  insulin teacing  endo follow up    JAGRUTI  creat  back to baseline  cont monitor    ambulate with PT  IS .

## 2022-07-04 NOTE — PROGRESS NOTE ADULT - SUBJECTIVE AND OBJECTIVE BOX
VITAL SIGNS-Telemetry:  SR   Vital Signs Last 24 Hrs  T(C): 36.4 (07-04-22 @ 03:19), Max: 36.7 (07-03-22 @ 15:48)  T(F): 97.6 (07-04-22 @ 03:19), Max: 98.1 (07-03-22 @ 15:48)  HR: 87 (07-04-22 @ 03:19) (85 - 95)  BP: 125/73 (07-04-22 @ 03:19) (102/60 - 125/73)  RR: 18 (07-04-22 @ 03:19) (18 - 18)  SpO2: 97% (07-04-22 @ 03:19) (96% - 97%)         07-03 @ 07:01  -  07-04 @ 07:00  --------------------------------------------------------  IN: 1040 mL / OUT: 2260 mL / NET: -1220 mL    Daily     Daily     CAPILLARY BLOOD GLUCOSE  POCT Blood Glucose.: 128 mg/dL (04 Jul 2022 07:07)  POCT Blood Glucose.: 132 mg/dL (03 Jul 2022 21:36)  POCT Blood Glucose.: 229 mg/dL (03 Jul 2022 17:37)  POCT Blood Glucose.: 214 mg/dL (03 Jul 2022 11:59)        PHYSICAL EXAM:  Neurology: alert and oriented x 3, nonfocal, no gross deficits  CV : S1S2  Sternal Wound :  CDI , Stable  Lungs: cta  Abdomen: soft, nontender, nondistended, positive bowel sounds, last bowel movement       7/2  Extremities:     no c/c/e    atovaquone  Suspension 1500 milliGRAM(s) Oral daily  cefepime   IVPB 1000 milliGRAM(s) IV Intermittent every 8 hours  chlorhexidine 2% Cloths 1 Application(s) Topical <User Schedule>  furosemide    Tablet 40 milliGRAM(s) Oral daily  heparin   Injectable 5000 Unit(s) SubCutaneous every 8 hours  insulin glargine Injectable (LANTUS) 12 Unit(s) SubCutaneous at bedtime  insulin lispro (ADMELOG) corrective regimen sliding scale   SubCutaneous three times a day before meals  insulin lispro (ADMELOG) corrective regimen sliding scale   SubCutaneous at bedtime  insulin lispro Injectable (ADMELOG) 2 Unit(s) SubCutaneous three times a day before meals  magnesium oxide 400 milliGRAM(s) Oral three times a day with meals  multivitamin 1 Tablet(s) Oral daily  mycophenolate mofetil 1000 milliGRAM(s) Oral every 12 hours  NIFEdipine XL 60 milliGRAM(s) Oral daily  nystatin    Suspension 057485 Unit(s) Oral <User Schedule>  pantoprazole    Tablet 40 milliGRAM(s) Oral before breakfast  polyethylene glycol 3350 17 Gram(s) Oral daily  predniSONE   Tablet 15 milliGRAM(s) Oral every 12 hours  rosuvastatin 5 milliGRAM(s) Oral every 24 hours  sildenafil (REVATIO) 10 milliGRAM(s) Oral three times a day  tacrolimus 3 milliGRAM(s) Oral every 12 hours  traMADol 25 milliGRAM(s) Oral every 8 hours PRN  valGANciclovir 450 milliGRAM(s) Oral every 12 hours  vancomycin    Solution 125 milliGRAM(s) Oral every 12 hours  vancomycin  IVPB 500 milliGRAM(s) IV Intermittent <User Schedule>      Physical Therapy Rec:   Home  [  ]   Home w/ PT  [  ]  Rehab  [  ]  Discussed with Cardiothoracic Team at AM rounds.

## 2022-07-05 ENCOUNTER — RESULT REVIEW (OUTPATIENT)
Age: 67
End: 2022-07-05

## 2022-07-05 ENCOUNTER — APPOINTMENT (OUTPATIENT)
Dept: CV DIAGNOSITCS | Facility: HOSPITAL | Age: 67
End: 2022-07-05

## 2022-07-05 LAB
ALBUMIN SERPL ELPH-MCNC: 4.9 G/DL — SIGNIFICANT CHANGE UP (ref 3.3–5)
ALP SERPL-CCNC: 74 U/L — SIGNIFICANT CHANGE UP (ref 40–120)
ALT FLD-CCNC: 41 U/L — SIGNIFICANT CHANGE UP (ref 10–45)
ANION GAP SERPL CALC-SCNC: 15 MMOL/L — SIGNIFICANT CHANGE UP (ref 5–17)
AST SERPL-CCNC: 21 U/L — SIGNIFICANT CHANGE UP (ref 10–40)
BASOPHILS # BLD AUTO: 0.03 K/UL — SIGNIFICANT CHANGE UP (ref 0–0.2)
BASOPHILS NFR BLD AUTO: 0.1 % — SIGNIFICANT CHANGE UP (ref 0–2)
BILIRUB SERPL-MCNC: 1.3 MG/DL — HIGH (ref 0.2–1.2)
BUN SERPL-MCNC: 52 MG/DL — HIGH (ref 7–23)
CALCIUM SERPL-MCNC: 10.4 MG/DL — SIGNIFICANT CHANGE UP (ref 8.4–10.5)
CHLORIDE SERPL-SCNC: 97 MMOL/L — SIGNIFICANT CHANGE UP (ref 96–108)
CO2 SERPL-SCNC: 24 MMOL/L — SIGNIFICANT CHANGE UP (ref 22–31)
CREAT SERPL-MCNC: 1.51 MG/DL — HIGH (ref 0.5–1.3)
EGFR: 51 ML/MIN/1.73M2 — LOW
EOSINOPHIL # BLD AUTO: 0.04 K/UL — SIGNIFICANT CHANGE UP (ref 0–0.5)
EOSINOPHIL NFR BLD AUTO: 0.2 % — SIGNIFICANT CHANGE UP (ref 0–6)
GLUCOSE BLDC GLUCOMTR-MCNC: 104 MG/DL — HIGH (ref 70–99)
GLUCOSE BLDC GLUCOMTR-MCNC: 112 MG/DL — HIGH (ref 70–99)
GLUCOSE BLDC GLUCOMTR-MCNC: 116 MG/DL — HIGH (ref 70–99)
GLUCOSE BLDC GLUCOMTR-MCNC: 172 MG/DL — HIGH (ref 70–99)
GLUCOSE SERPL-MCNC: 114 MG/DL — HIGH (ref 70–99)
HCT VFR BLD CALC: 42.1 % — SIGNIFICANT CHANGE UP (ref 39–50)
HGB BLD-MCNC: 14.1 G/DL — SIGNIFICANT CHANGE UP (ref 13–17)
IMM GRANULOCYTES NFR BLD AUTO: 0.9 % — SIGNIFICANT CHANGE UP (ref 0–1.5)
LYMPHOCYTES # BLD AUTO: 1.66 K/UL — SIGNIFICANT CHANGE UP (ref 1–3.3)
LYMPHOCYTES # BLD AUTO: 7.9 % — LOW (ref 13–44)
MAGNESIUM SERPL-MCNC: 2.2 MG/DL — SIGNIFICANT CHANGE UP (ref 1.6–2.6)
MCHC RBC-ENTMCNC: 29.7 PG — SIGNIFICANT CHANGE UP (ref 27–34)
MCHC RBC-ENTMCNC: 33.5 GM/DL — SIGNIFICANT CHANGE UP (ref 32–36)
MCV RBC AUTO: 88.8 FL — SIGNIFICANT CHANGE UP (ref 80–100)
MONOCYTES # BLD AUTO: 0.96 K/UL — HIGH (ref 0–0.9)
MONOCYTES NFR BLD AUTO: 4.5 % — SIGNIFICANT CHANGE UP (ref 2–14)
NEUTROPHILS # BLD AUTO: 18.26 K/UL — HIGH (ref 1.8–7.4)
NEUTROPHILS NFR BLD AUTO: 86.4 % — HIGH (ref 43–77)
NRBC # BLD: 0 /100 WBCS — SIGNIFICANT CHANGE UP (ref 0–0)
PHOSPHATE SERPL-MCNC: 2.7 MG/DL — SIGNIFICANT CHANGE UP (ref 2.5–4.5)
PLATELET # BLD AUTO: 319 K/UL — SIGNIFICANT CHANGE UP (ref 150–400)
POTASSIUM SERPL-MCNC: 5.2 MMOL/L — SIGNIFICANT CHANGE UP (ref 3.5–5.3)
POTASSIUM SERPL-SCNC: 5.2 MMOL/L — SIGNIFICANT CHANGE UP (ref 3.5–5.3)
PROT SERPL-MCNC: 7.9 G/DL — SIGNIFICANT CHANGE UP (ref 6–8.3)
RBC # BLD: 4.74 M/UL — SIGNIFICANT CHANGE UP (ref 4.2–5.8)
RBC # FLD: 18.8 % — HIGH (ref 10.3–14.5)
SODIUM SERPL-SCNC: 136 MMOL/L — SIGNIFICANT CHANGE UP (ref 135–145)
SURGICAL PATHOLOGY STUDY: SIGNIFICANT CHANGE UP
TACROLIMUS SERPL-MCNC: 12.8 NG/ML — SIGNIFICANT CHANGE UP
VANCOMYCIN TROUGH SERPL-MCNC: 10.8 UG/ML — SIGNIFICANT CHANGE UP (ref 10–20)
WBC # BLD: 20.42 K/UL — HIGH (ref 3.8–10.5)
WBC # FLD AUTO: 20.42 K/UL — HIGH (ref 3.8–10.5)

## 2022-07-05 PROCEDURE — 88346 IMFLUOR 1ST 1ANTB STAIN PX: CPT | Mod: 26

## 2022-07-05 PROCEDURE — 71250 CT THORAX DX C-: CPT | Mod: 26

## 2022-07-05 PROCEDURE — 99152 MOD SED SAME PHYS/QHP 5/>YRS: CPT

## 2022-07-05 PROCEDURE — 99232 SBSQ HOSP IP/OBS MODERATE 35: CPT

## 2022-07-05 PROCEDURE — 71045 X-RAY EXAM CHEST 1 VIEW: CPT | Mod: 26

## 2022-07-05 PROCEDURE — 93308 TTE F-UP OR LMTD: CPT | Mod: 26

## 2022-07-05 PROCEDURE — 99232 SBSQ HOSP IP/OBS MODERATE 35: CPT | Mod: GC

## 2022-07-05 PROCEDURE — 74176 CT ABD & PELVIS W/O CONTRAST: CPT | Mod: 26

## 2022-07-05 PROCEDURE — 93321 DOPPLER ECHO F-UP/LMTD STD: CPT | Mod: 26

## 2022-07-05 PROCEDURE — 99232 SBSQ HOSP IP/OBS MODERATE 35: CPT | Mod: 24

## 2022-07-05 PROCEDURE — 88307 TISSUE EXAM BY PATHOLOGIST: CPT | Mod: 26

## 2022-07-05 PROCEDURE — 93505 ENDOMYOCARDIAL BIOPSY: CPT | Mod: 26

## 2022-07-05 PROCEDURE — 99233 SBSQ HOSP IP/OBS HIGH 50: CPT | Mod: 24

## 2022-07-05 RX ORDER — HEPARIN SODIUM 5000 [USP'U]/ML
5000 INJECTION INTRAVENOUS; SUBCUTANEOUS EVERY 8 HOURS
Refills: 0 | Status: DISCONTINUED | OUTPATIENT
Start: 2022-07-06 | End: 2022-07-07

## 2022-07-05 RX ADMIN — MAGNESIUM OXIDE 400 MG ORAL TABLET 400 MILLIGRAM(S): 241.3 TABLET ORAL at 12:59

## 2022-07-05 RX ADMIN — Medication 125 MILLIGRAM(S): at 20:33

## 2022-07-05 RX ADMIN — Medication 15 MILLIGRAM(S): at 20:33

## 2022-07-05 RX ADMIN — CHLORHEXIDINE GLUCONATE 1 APPLICATION(S): 213 SOLUTION TOPICAL at 06:14

## 2022-07-05 RX ADMIN — Medication 500000 UNIT(S): at 20:34

## 2022-07-05 RX ADMIN — Medication 15 MILLIGRAM(S): at 07:23

## 2022-07-05 RX ADMIN — INSULIN GLARGINE 12 UNIT(S): 100 INJECTION, SOLUTION SUBCUTANEOUS at 21:16

## 2022-07-05 RX ADMIN — MYCOPHENOLATE MOFETIL 1000 MILLIGRAM(S): 250 CAPSULE ORAL at 20:34

## 2022-07-05 RX ADMIN — CEFEPIME 100 MILLIGRAM(S): 1 INJECTION, POWDER, FOR SOLUTION INTRAMUSCULAR; INTRAVENOUS at 20:33

## 2022-07-05 RX ADMIN — Medication 10 MILLIGRAM(S): at 21:15

## 2022-07-05 RX ADMIN — Medication 10 MILLIGRAM(S): at 13:41

## 2022-07-05 RX ADMIN — MYCOPHENOLATE MOFETIL 1000 MILLIGRAM(S): 250 CAPSULE ORAL at 07:20

## 2022-07-05 RX ADMIN — Medication 1 TABLET(S): at 12:54

## 2022-07-05 RX ADMIN — Medication 40 MILLIGRAM(S): at 06:10

## 2022-07-05 RX ADMIN — CEFEPIME 100 MILLIGRAM(S): 1 INJECTION, POWDER, FOR SOLUTION INTRAMUSCULAR; INTRAVENOUS at 05:08

## 2022-07-05 RX ADMIN — ROSUVASTATIN CALCIUM 5 MILLIGRAM(S): 5 TABLET ORAL at 20:33

## 2022-07-05 RX ADMIN — Medication 100 MILLIGRAM(S): at 20:32

## 2022-07-05 RX ADMIN — VALGANCICLOVIR 450 MILLIGRAM(S): 450 TABLET, FILM COATED ORAL at 20:33

## 2022-07-05 RX ADMIN — Medication 500000 UNIT(S): at 17:46

## 2022-07-05 RX ADMIN — TACROLIMUS 3 MILLIGRAM(S): 5 CAPSULE ORAL at 07:21

## 2022-07-05 RX ADMIN — Medication 500000 UNIT(S): at 12:53

## 2022-07-05 RX ADMIN — Medication 60 MILLIGRAM(S): at 06:10

## 2022-07-05 RX ADMIN — MAGNESIUM OXIDE 400 MG ORAL TABLET 400 MILLIGRAM(S): 241.3 TABLET ORAL at 17:48

## 2022-07-05 RX ADMIN — VALGANCICLOVIR 450 MILLIGRAM(S): 450 TABLET, FILM COATED ORAL at 07:22

## 2022-07-05 RX ADMIN — CEFEPIME 100 MILLIGRAM(S): 1 INJECTION, POWDER, FOR SOLUTION INTRAMUSCULAR; INTRAVENOUS at 13:41

## 2022-07-05 RX ADMIN — Medication 2 UNIT(S): at 17:48

## 2022-07-05 RX ADMIN — Medication 125 MILLIGRAM(S): at 07:22

## 2022-07-05 RX ADMIN — Medication 10 MILLIGRAM(S): at 05:08

## 2022-07-05 RX ADMIN — Medication 2 UNIT(S): at 12:52

## 2022-07-05 RX ADMIN — Medication 500000 UNIT(S): at 07:20

## 2022-07-05 RX ADMIN — MAGNESIUM OXIDE 400 MG ORAL TABLET 400 MILLIGRAM(S): 241.3 TABLET ORAL at 07:37

## 2022-07-05 RX ADMIN — Medication 100 MILLIGRAM(S): at 07:33

## 2022-07-05 RX ADMIN — TACROLIMUS 3 MILLIGRAM(S): 5 CAPSULE ORAL at 20:33

## 2022-07-05 RX ADMIN — Medication 1: at 12:50

## 2022-07-05 RX ADMIN — PANTOPRAZOLE SODIUM 40 MILLIGRAM(S): 20 TABLET, DELAYED RELEASE ORAL at 06:11

## 2022-07-05 RX ADMIN — ATOVAQUONE 1500 MILLIGRAM(S): 750 SUSPENSION ORAL at 12:53

## 2022-07-05 NOTE — PROGRESS NOTE ADULT - PROBLEM SELECTOR PLAN 1
- s/p OHT with Dr. Earl/Maria C on 6/21 (ischemic time 261 min)  - IABP removed on POD 1  - discontinue Lasix at this time, appear euvolemic on exam   - continue sildenafil 10 mg PO TID   - continue procardia 60 mg QD  - CAV ppx: ASA on discharge. Rosuvastatin 5 mg qHS

## 2022-07-05 NOTE — PROGRESS NOTE ADULT - NS ATTEND AMEND GEN_ALL_CORE FT
67 y/o M with h/o HFrEF, admitted with VT and cardiogenic shock, now s/p OHT 6/21/2022 (CMV +/+, Toxo -/-).   RHC today with low filling pressures, PA pressures improved from last week since starting Sildenafil.   Stop diuretics and encourage PO intake.   Monitor WBC. CT chest/abdomen/pelvis. F/U procalcitonin.   Continue Tacrolimus (goal trough 12-14), CellCept 1000mg BID, Prednisone taper. For prophylaxis, continue Atovaquone, Nystatin, Valcyte. Continue statin and ASA on discharge.

## 2022-07-05 NOTE — PROGRESS NOTE ADULT - ASSESSMENT
66M Mixed Ischemic/NICM Cardiomyopathy (EF 25-30% at baseline, s/p BIV-ICD), CAD (medically managed MIs in ,, Most recent stent in 2022 to mLAD) presented with multiple near syncope, found to have 41 episodes of VT and admitted initially to cardiac telemetry for further evaluation/management. Ischemic evaluation without new disease and VT ablation without substrate to complete ablation. Transferred from [facility] to [facility] for further management and evaluation for LVAD vs OHT. CT surgery consulted for LVAD/transplant evaluation.   Currently undergoing LVAD/transplant eval  Care per Heart failure and CCU primary team  Preop work up and diagnostics in progress  22 s/pOrthotopic heart transplant, ICD removal, iabp  Post op inotropes /pressors, nitric oxide  /Primacor/insulin  extubated d 1, hi flow   iabp d/c    nitric d/c   s/p biopsy, pw d/c,echo neg effusion  Htn-hydralazine, procardia added increased   JAGRUTI-stable, renal transplant following  hyponatremia, lasix/albumin  L sc thrombus,R cephalic thrombus on heparin sq---> plan to check LE duplex and if + may need full anticoagulation  Insulin infusion remains on  On vanco and cefepime for icd pocket + staph epi and morganella   primacor d/c   Transferred to sdu  As per renal lasix increased 40po bid  Chest tube # 3 d/c   VSS; insulin gttp weaned off overnight;  RSR 80-90; ?d/c med ct today- d/w Dr. Lombardi; LE sono neg DVT; tacro level today 10- continue tacro 4 po bid; vanco trough level 5.6- vanco 500 mg iv bid; change to po abx at home   Discharge planning- home ? fri; ck covid pcr; next biopsy scheduled  Call [facility] cardiology for anticoagulation recs. D/c hydralazine to maximize sildenefil. Hep sq.   D/c one  Ms ct today  Cont bid diuresis   Maintain  ct, if under 100cc can d/c   Hyperkalemia, diet restricted , no furrther intervention, discussed in rounds  Decrease lasix qd ./Ace wrap LE  7/3 VSS WBC up to 19,000 - Mg 1.7 - HF rounds made - plan: mg 2gm ivss x1, start mg ox 400mg po tid. bc x 2 & u/a for leukocytosis. d/c plan ? end of week -  RHC/biospy   VSS - will ck WBC this am - u/a negative - BC testing from yesterday d/t increase WBC- ck covid this am - NPO after midnight for biopsy in am- d/c planning   VSS wbc up to 20 - plan: RHC w/ biopsy this am.  CT chest/abd/pelvis per DR. Lombardi.  rpt b/l UE doppplers per Dr. Muse, [facility] cardiolgoy.  d/c plan home end of week ?Friday.  lasix d/c'd for now - will reassess after RHC results  66M Mixed Ischemic/NICM Cardiomyopathy (EF 25-30% at baseline, s/p BIV-ICD), CAD (medically managed MIs in ,, Most recent stent in 2022 to mLAD) presented with multiple near syncope, found to have 41 episodes of VT and admitted initially to cardiac telemetry for further evaluation/management. Ischemic evaluation without new disease and VT ablation without substrate to complete ablation. Transferred from [facility] to [facility] for further management and evaluation for LVAD vs OHT. CT surgery consulted for LVAD/transplant evaluation.   Currently undergoing LVAD/transplant eval  Care per Heart failure and CCU primary team  Preop work up and diagnostics in progress  22 s/pOrthotopic heart transplant, ICD removal, iabp  Post op inotropes /pressors, nitric oxide  /Primacor/insulin  extubated d 1, hi flow   iabp d/c    nitric d/c   s/p biopsy, pw d/c,echo neg effusion  Htn-hydralazine, procardia added increased   JAGRUTI-stable, renal transplant following  hyponatremia, lasix/albumin  L sc thrombus,R cephalic thrombus on heparin sq---> plan to check LE duplex and if + may need full anticoagulation  Insulin infusion remains on  On vanco and cefepime for icd pocket + staph epi and morganella   primacor d/c   Transferred to sdu  As per renal lasix increased 40po bid  Chest tube # 3 d/c   VSS; insulin gttp weaned off overnight;  RSR 80-90; ?d/c med ct today- d/w Dr. Lombardi; LE sono neg DVT; tacro level today 10- continue tacro 4 po bid; vanco trough level 5.6- vanco 500 mg iv bid; change to po abx at home   Discharge planning- home ? fri; ck covid pcr; next biopsy scheduled  Call [facility] cardiology for anticoagulation recs. D/c hydralazine to maximize sildenefil. Hep sq.   D/c one  Ms ct today  Cont bid diuresis   Maintain  ct, if under 100cc can d/c   Hyperkalemia, diet restricted , no furrther intervention, discussed in rounds  Decrease lasix qd ./Ace wrap LE  7/3 VSS WBC up to 19,000 - Mg 1.7 - HF rounds made - plan: mg 2gm ivss x1, start mg ox 400mg po tid. bc x 2 & u/a for leukocytosis. d/c plan ? end of week -  RHC/biospy   VSS - will ck WBC this am - u/a negative - BC testing from yesterday d/t increase WBC- ck covid this am - NPO after midnight for biopsy in am- d/c planning   VSS wbc up to 20 - differential added to am CBC - s/w Mauro in lab - plan: RHC w/ biopsy this am.  CT chest/abd/pelvis per DR. Lombardi.  rpt b/l UE doppplers per Dr. Muse, [facility] cardiolgoy.  d/c plan home end of week ?Friday.  lasix d/c'd for now - will reassess after RHC results

## 2022-07-05 NOTE — PROGRESS NOTE ADULT - SUBJECTIVE AND OBJECTIVE BOX
VITAL SIGNS-Telemetry:  SR   Vital Signs Last 24 Hrs  T(C): 36.4 (22 @ 08:18), Max: 36.7 (22 @ 14:43)  T(F): 97.5 (22 @ 08:18), Max: 98.1 (22 @ 19:15)  HR: 93 (22 @ 08:18) (88 - 96)  BP: 130/80 (22 @ 08:18) (113/79 - 130/80)  RR: 17 (22 @ 08:18) (15 - 18)  SpO2: 100% (22 @ 08:18) (96% - 100%)          @ 07:01  -   @ 07:00  --------------------------------------------------------  IN: 150 mL / OUT: 2050 mL / NET: -1900 mL     @ 07:01  -  05 @ 10:03  --------------------------------------------------------  IN: 0 mL / OUT: 200 mL / NET: -200 mL        Daily     Daily Weight in k.2 (2022 06:25)    CAPILLARY BLOOD GLUCOSE  POCT Blood Glucose.: 116 mg/dL (2022 07:26)  POCT Blood Glucose.: 155 mg/dL (2022 21:57)  POCT Blood Glucose.: 155 mg/dL (2022 20:45)  POCT Blood Glucose.: 130 mg/dL (2022 17:44)  POCT Blood Glucose.: 198 mg/dL (2022 12:08)      :       PHYSICAL EXAM:  Neurology: alert and oriented x 3, nonfocal, no gross deficits  CV : S1S2  Sternal Wound :  CDI , Stable  Lungs: cta  Abdomen: soft, nontender, nondistended, positive bowel sounds, last bowel movement      7.4   Extremities:     no c/c/e    atovaquone  Suspension 1500 milliGRAM(s) Oral daily  cefepime   IVPB 1000 milliGRAM(s) IV Intermittent every 8 hours  chlorhexidine 2% Cloths 1 Application(s) Topical <User Schedule>  furosemide    Tablet 40 milliGRAM(s) Oral daily  insulin glargine Injectable (LANTUS) 12 Unit(s) SubCutaneous at bedtime  insulin lispro (ADMELOG) corrective regimen sliding scale   SubCutaneous three times a day before meals  insulin lispro (ADMELOG) corrective regimen sliding scale   SubCutaneous at bedtime  insulin lispro Injectable (ADMELOG) 2 Unit(s) SubCutaneous three times a day before meals  magnesium oxide 400 milliGRAM(s) Oral three times a day with meals  multivitamin 1 Tablet(s) Oral daily  mycophenolate mofetil 1000 milliGRAM(s) Oral every 12 hours  NIFEdipine XL 60 milliGRAM(s) Oral daily  nystatin    Suspension 689028 Unit(s) Oral <User Schedule>  pantoprazole    Tablet 40 milliGRAM(s) Oral before breakfast  polyethylene glycol 3350 17 Gram(s) Oral daily  predniSONE   Tablet 15 milliGRAM(s) Oral every 12 hours  rosuvastatin 5 milliGRAM(s) Oral every 24 hours  sildenafil (REVATIO) 10 milliGRAM(s) Oral three times a day  tacrolimus 3 milliGRAM(s) Oral every 12 hours  valGANciclovir 450 milliGRAM(s) Oral every 12 hours  vancomycin    Solution 125 milliGRAM(s) Oral every 12 hours  vancomycin  IVPB 500 milliGRAM(s) IV Intermittent <User Schedule>      Physical Therapy Rec:   Home  [ x ]   Home w/ PT  [  ]  Rehab  [  ]  Discussed with Cardiothoracic Team at AM rounds. VITAL SIGNS-Telemetry:  SR   Vital Signs Last 24 Hrs  T(C): 36.4 (22 @ 08:18), Max: 36.7 (22 @ 14:43)  T(F): 97.5 (22 @ 08:18), Max: 98.1 (22 @ 19:15)  HR: 93 (22 @ 08:18) (88 - 96)  BP: 130/80 (22 @ 08:18) (113/79 - 130/80)  RR: 17 (22 @ 08:18) (15 - 18)  SpO2: 100% (22 @ 08:18) (96% - 100%)          @ 07:01  -   @ 07:00  --------------------------------------------------------  IN: 150 mL / OUT: 2050 mL / NET: -1900 mL     @ 07:01  -  05 @ 10:03  --------------------------------------------------------  IN: 0 mL / OUT: 200 mL / NET: -200 mL    Daily     Daily Weight in k.2 (2022 06:25)    CAPILLARY BLOOD GLUCOSE  POCT Blood Glucose.: 116 mg/dL (2022 07:26)  POCT Blood Glucose.: 155 mg/dL (2022 21:57)  POCT Blood Glucose.: 155 mg/dL (2022 20:45)  POCT Blood Glucose.: 130 mg/dL (2022 17:44)  POCT Blood Glucose.: 198 mg/dL (2022 12:08)      :       PHYSICAL EXAM:  Neurology: alert and oriented x 3, nonfocal, no gross deficits  CV : S1S2  Sternal Wound :  CDI , Stable  Lungs: cta  Abdomen: soft, nontender, nondistended, positive bowel sounds, last bowel movement      7.4   Extremities:     no c/c/e    atovaquone  Suspension 1500 milliGRAM(s) Oral daily  cefepime   IVPB 1000 milliGRAM(s) IV Intermittent every 8 hours  chlorhexidine 2% Cloths 1 Application(s) Topical <User Schedule>  furosemide    Tablet 40 milliGRAM(s) Oral daily  insulin glargine Injectable (LANTUS) 12 Unit(s) SubCutaneous at bedtime  insulin lispro (ADMELOG) corrective regimen sliding scale   SubCutaneous three times a day before meals  insulin lispro (ADMELOG) corrective regimen sliding scale   SubCutaneous at bedtime  insulin lispro Injectable (ADMELOG) 2 Unit(s) SubCutaneous three times a day before meals  magnesium oxide 400 milliGRAM(s) Oral three times a day with meals  multivitamin 1 Tablet(s) Oral daily  mycophenolate mofetil 1000 milliGRAM(s) Oral every 12 hours  NIFEdipine XL 60 milliGRAM(s) Oral daily  nystatin    Suspension 128947 Unit(s) Oral <User Schedule>  pantoprazole    Tablet 40 milliGRAM(s) Oral before breakfast  polyethylene glycol 3350 17 Gram(s) Oral daily  predniSONE   Tablet 15 milliGRAM(s) Oral every 12 hours  rosuvastatin 5 milliGRAM(s) Oral every 24 hours  sildenafil (REVATIO) 10 milliGRAM(s) Oral three times a day  tacrolimus 3 milliGRAM(s) Oral every 12 hours  valGANciclovir 450 milliGRAM(s) Oral every 12 hours  vancomycin    Solution 125 milliGRAM(s) Oral every 12 hours  vancomycin  IVPB 500 milliGRAM(s) IV Intermittent <User Schedule>      Physical Therapy Rec:   Home  [ x ]   Home w/ PT  [  ]  Rehab  [  ]  Discussed with Cardiothoracic Team at AM rounds.

## 2022-07-05 NOTE — PROGRESS NOTE ADULT - PROBLEM SELECTOR PLAN 5
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 Gracia following, plan to repeat UE duplex b/l today  - holding Heparin SQ until tomorrow AM

## 2022-07-05 NOTE — PROGRESS NOTE ADULT - SUBJECTIVE AND OBJECTIVE BOX
Subjective: Patient seen and examined resting in chair. Schedule for RHC/EMBx today.     Medications:  atovaquone  Suspension 1500 milliGRAM(s) Oral daily  cefepime   IVPB 1000 milliGRAM(s) IV Intermittent every 8 hours  chlorhexidine 2% Cloths 1 Application(s) Topical <User Schedule>  insulin glargine Injectable (LANTUS) 12 Unit(s) SubCutaneous at bedtime  insulin lispro (ADMELOG) corrective regimen sliding scale   SubCutaneous three times a day before meals  insulin lispro (ADMELOG) corrective regimen sliding scale   SubCutaneous at bedtime  insulin lispro Injectable (ADMELOG) 2 Unit(s) SubCutaneous three times a day before meals  magnesium oxide 400 milliGRAM(s) Oral three times a day with meals  multivitamin 1 Tablet(s) Oral daily  mycophenolate mofetil 1000 milliGRAM(s) Oral every 12 hours  NIFEdipine XL 60 milliGRAM(s) Oral daily  nystatin    Suspension 654296 Unit(s) Oral <User Schedule>  pantoprazole    Tablet 40 milliGRAM(s) Oral before breakfast  polyethylene glycol 3350 17 Gram(s) Oral daily  predniSONE   Tablet 15 milliGRAM(s) Oral every 12 hours  rosuvastatin 5 milliGRAM(s) Oral every 24 hours  sildenafil (REVATIO) 10 milliGRAM(s) Oral three times a day  tacrolimus 3 milliGRAM(s) Oral every 12 hours  valGANciclovir 450 milliGRAM(s) Oral every 12 hours  vancomycin    Solution 125 milliGRAM(s) Oral every 12 hours  vancomycin  IVPB 500 milliGRAM(s) IV Intermittent <User Schedule>      Vitals:  Vital Signs Last 24 Hours  T(C): 36.3 (22 @ 12:46), Max: 36.7 (22 @ 14:43)  HR: 94 (22 @ 12:46) (88 - 96)  BP: 100/66 (22 @ 12:46) (100/66 - 130/80)  RR: 18 (22 @ 12:46) (15 - 18)  SpO2: 97% (22 @ 12:46) (96% - 100%)    Weight in k.2 ( @ 06:25)    I&O's Summary    2022 07:01  -  2022 07:00  --------------------------------------------------------  IN: 150 mL / OUT: 2050 mL / NET: -1900 mL    2022 07:01  -  2022 13:14  --------------------------------------------------------  IN: 100 mL / OUT: 550 mL / NET: -450 mL        Physical Exam  General: No distress. Comfortable.  Neck: JVP ~6cm  Chest: Clear to auscultation bilaterally  CV: Normal S1 and S2. No murmurs, rub, or gallops. Radial pulses normal.  Abdomen: Soft, non-distended, non-tender  Neurology: Alert and oriented times three.        Labs:                        14.1   20.42 )-----------( 319      ( 2022 07:29 )             42.1     07-05    136  |  97  |  52<H>  ----------------------------<  114<H>  5.2   |  24  |  1.51<H>    Ca    10.4      2022 07:31  Phos  2.7     07-05  Mg     2.2     07-05    TPro  7.9  /  Alb  4.9  /  TBili  1.3<H>  /  DBili  x   /  AST  21  /  ALT  41  /  AlkPhos  74  07-05

## 2022-07-05 NOTE — PROGRESS NOTE ADULT - PROBLEM SELECTOR PLAN 4
- postop course c/b leukocytosis  - ICD pocket culture positive for staph epidermidis/ morganella morganii  - remains on Cefepime and IV Vancomycin. To complete 2-3 week course post ICD explant. Can switch to Cefpodoxime and Doxy at time of discharge per ID  - worsening leukocytosis though remains afebrile. Thus far cultures NGTD  - plan to obtain CT C/A/P today

## 2022-07-05 NOTE — PROGRESS NOTE ADULT - PROBLEM SELECTOR PLAN 6
- today's labs showed a slight bump in renal function, likely related to over diuresis.   - holding lasix as stated above, please encourage patient to increase PO intake   - cardiorenal following

## 2022-07-05 NOTE — PROGRESS NOTE ADULT - PROBLEM SELECTOR PLAN 1
·  Plan: - s/p OHT with Dr. Earl/Maria C on 6/21 (ischemic time 261 min)  - IABP removed on POD 1  - d/c primacor today 6/29  - adjust tacro.   7/5 hold lasix until RHC results  - continue hydralazine 25 mg PO TID and Procardia 60mg PO QD.--7/1 d/c hydralazine

## 2022-07-05 NOTE — PROGRESS NOTE ADULT - ASSESSMENT
66M male with PMHx HTN, HLD, type 2 DM, chronic HFrEF,  (EF 25-30% by Echo) who underwent OHT on 6/21/22, currently on high dose steroid taper. Endocrinology consulted for assistance with management of steroid induced hyperglycemia/dm2.     Controlled type 2 diabetes mellitus with hyperglycemia.   home regimen: glimperide 1mg  Inpatient:  -On prednisone 15mg BID , inform endocrine  when steroids further tapered   -test BG AC/HS  BG Goal 100-180mg/dl   BG at goal past 24 hours c/w current regimen  -c/w Lantus 12 units QHS  -c/w Admelog 2 units AC meals  -c/w Admelog low correction scale AC and low HS scale  -cons carb diet  -insulin and glucometer teaching       DC Planning: Basal+ Oral, will consider Lantus (final dose TBD)  +Metformin 500mg BID final doses tbd , will depend on insulin requirements & steroid doses at the time of discharge.   Please send test scripts for basal insulin pen (ie. Basaglar, Lantus, Tresiba, Toujeo, Levemir)  to check for insurance coverage Please send prescriptions for diabetes supplies (glucometer, test strips, lancets, alcohol swabs, insulin pen needles). Routine outpatient opthalmology & podiatry evaluations recommended. Patient can follow up with endocrinology at the location provided below:     [facility]   [address] [place] [id number]  [phone].     Problem/Plan - 2:  ·  Problem: HTN (hypertension).   ·  Plan: Recommendations:   - outpt BP goal < 130/80   - defer to primary team   - outpatient annual urinary microalb/cr ratio.     Problem/Plan - 3:  ·  Problem: HLD (hyperlipidemia).   ·  Plan: Recommendations:   - LDL goal < 70   - defer to primary team   - outpatient fasting lipid profile      Discussed with patient and primary  team Sachi MANRIQUEZ   Contact via [employer] Teams during business hours  On evenings and weekends, please call [phone] or page endocrine fellow on call.   Please note that this patient may be followed by different provider tomorrow.    Time spent on encounter: 26 minutes spent on total encounter; The necessity of the time spent during the encounter on this date of service was due to development of plan of care/coordination of care/glycemic control through review of labs, blood glucose values and vital signs.

## 2022-07-05 NOTE — PROGRESS NOTE ADULT - PROBLEM SELECTOR PLAN 2
- adjustment of Tacrolimus for goal of 12-14  - Continue Prednisone taper per protocol   - Cellcept 1000 mg PO BID

## 2022-07-05 NOTE — PROGRESS NOTE ADULT - ASSESSMENT
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 LHC did not reveal new obstructive CAD and he underwent EPS which did not reveal endocardial substrate amenable for ablation. RHC revealed severely depressed cardiac output and he was transferred to [facility] 5/26 for advanced therapies evaluation.    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 He improved significantly with sequential uptitration of inotropes and increased pacing rate, but on 6/6 he had worsening JAGRUTI. He is s/p RHC which revealed severely elevated filling pressures, severe cpcPH, and CI of 1.9 on milrinone 0.5. IABP was placed and his renal function/PAPs have improved. He is MCS dependent and was inadequately supported with high dose inotropes, so was listed UNOS status 2e for OHT, awaiting suitable donor. However, was made status 7 as he became febrile, last 6/7 T 38. He has been afebrile since and blood cultures from 6/7 with NGTD x 48 hours and his leukocytosis has not worsened. Discussed with transplant ID and since bld cx negative x48hrs he has been re-listed UNOS status 2e.     A suitable donor was identified and patient underwent OHT with Dr. Earl/Maria C on 6/21 (ischemic time 261 mins, CMV +/+, Toxo -/-). Patient was successfully extubated and IABP was removed on POD 1. Off Maricruz. Cultures from his ICD site in the OR returned positive for staph epidermidis/ morganella morganii for which is being treated with IV Vancomycin and Cefepime. He's overall progressing well though his leukocytosis continues to rise. Plan to obtain CT C/A/P to rule out source of infection. Also his renal function has slightly increased, likely related to over diuresis. He is nearing readiness for discharge and hopeful for discharge home end of week.     RHC/EMBx  6/28: RA 9, RV 3/11, PA 62/27/41, wedge 21 with v wave 31, PA sat 69.3%, /81/102, CO/Ci 6.87/3.62, grade 1R/2  7/5: RA 2, RV 35/3, PA 36/18/26, Wedge 15, PA sat 71.7, Milana CO/CI 5.4/2.8, /75/93, SVR 1345 dsc       Review of studies  TTE 5/21: LV 5.2 cm, LVEF 10-15%, LVOT VTI 10 cm, moderate RV dysfunction, mild AI, minimal MR  EKG: a-BiV paced  C 5/23: patent mLAD stent with slow flow, D1 with 40-50% stenosis, mild disease otherwise  RHC 5/26: RA 12, PA 70/40 (50), PCWP 22, Milana CO/CI 2.3/1.3, MAP 83 with SVR 2469, PVR 12 PERSAUD

## 2022-07-05 NOTE — PROGRESS NOTE ADULT - SUBJECTIVE AND OBJECTIVE BOX
[facility] DIVISION OF KIDNEY DISEASES AND HYPERTENSION -- FOLLOW UP NOTE  --------------------------------------------------------------------------------  66M male with PMHx HTN, HLD, type 2 DM, chronic HFrEF,  (EF 25-30% by Echo) underwent OHT on 6/21/22. Nephrology following for JAGRUTI.     Upon review of labs, Scr was 1.2 on 4/19/22 and was elevated at 1.8 on 5/24/22. SCr downtrended to 1.01 on 6/20/22. Pt underwent OHT on 6/21/22 and required IABP post operatively. Scr uptrended to 1.23 on 6/23/22 with elevated PA pressures. SCr increased to 1.97. Pt currently on diuretics and non oliguric. UA done on 6/8/22 showed trace blood and proteinuria.  SCr stable/elevated at 1.4 today.    Pt. seen and examined today sitting comfortably in chair. Good UOP 1.65L. SCr. increased to 1.5 today. Tacrolimus level noted to be 12.8 today. Denies any acute complaints.      PAST HISTORY  --------------------------------------------------------------------------------  No significant changes to PMH, PSH, FHx, SHx, unless otherwise noted    ALLERGIES & MEDICATIONS  --------------------------------------------------------------------------------  Allergies    No Known Allergies    Intolerances      Standing Inpatient Medications  atovaquone  Suspension 1500 milliGRAM(s) Oral daily  cefepime   IVPB 1000 milliGRAM(s) IV Intermittent every 8 hours  chlorhexidine 2% Cloths 1 Application(s) Topical <User Schedule>  insulin glargine Injectable (LANTUS) 12 Unit(s) SubCutaneous at bedtime  insulin lispro (ADMELOG) corrective regimen sliding scale   SubCutaneous three times a day before meals  insulin lispro (ADMELOG) corrective regimen sliding scale   SubCutaneous at bedtime  insulin lispro Injectable (ADMELOG) 2 Unit(s) SubCutaneous three times a day before meals  magnesium oxide 400 milliGRAM(s) Oral three times a day with meals  multivitamin 1 Tablet(s) Oral daily  mycophenolate mofetil 1000 milliGRAM(s) Oral every 12 hours  NIFEdipine XL 60 milliGRAM(s) Oral daily  nystatin    Suspension 932605 Unit(s) Oral <User Schedule>  pantoprazole    Tablet 40 milliGRAM(s) Oral before breakfast  polyethylene glycol 3350 17 Gram(s) Oral daily  predniSONE   Tablet 15 milliGRAM(s) Oral every 12 hours  rosuvastatin 5 milliGRAM(s) Oral every 24 hours  sildenafil (REVATIO) 10 milliGRAM(s) Oral three times a day  tacrolimus 3 milliGRAM(s) Oral every 12 hours  valGANciclovir 450 milliGRAM(s) Oral every 12 hours  vancomycin    Solution 125 milliGRAM(s) Oral every 12 hours  vancomycin  IVPB 500 milliGRAM(s) IV Intermittent <User Schedule>    PRN Inpatient Medications      REVIEW OF SYSTEMS  --------------------------------------------------------------------------------  Gen: No fevers/chills  Skin: No rashes  Head/Eyes/Ears: Normal hearing,   Respiratory: No dyspnea, cough  CV: No chest pain  GI: No abdominal pain, diarrhea  : No dysuria, hematuria  MSK:  trace edema (improved)    All other systems were reviewed and are negative, except as noted.    VITALS/PHYSICAL EXAM  --------------------------------------------------------------------------------  T(C): 36.4 (07-05-22 @ 15:08), Max: 36.7 (07-04-22 @ 19:15)  HR: 103 (07-05-22 @ 15:08) (88 - 103)  BP: 99/63 (07-05-22 @ 15:08) (99/63 - 130/80)  RR: 18 (07-05-22 @ 15:08) (15 - 18)  SpO2: 97% (07-05-22 @ 15:08) (96% - 100%)  Wt(kg): --        07-04-22 @ 07:01  -  07-05-22 @ 07:00  --------------------------------------------------------  IN: 150 mL / OUT: 2050 mL / NET: -1900 mL    07-05-22 @ 07:01  -  07-05-22 @ 15:57  --------------------------------------------------------  IN: 100 mL / OUT: 550 mL / NET: -450 mL      Physical Exam:  	Gen: NAD  	HEENT: Anicteric  	Pulm: CTA B/L  	CV: S1S2+, midline sx scar, dressing +  	Abd: Soft, +BS    	Ext: LE edema B/L (trace)  	Neuro: Awake  	Skin: Warm and dry      LABS/STUDIES  --------------------------------------------------------------------------------              14.1   20.42 >-----------<  319      [07-05-22 @ 07:29]              42.1     136  |  97  |  52  ----------------------------<  114      [07-05-22 @ 07:31]  5.2   |  24  |  1.51        Ca     10.4     [07-05-22 @ 07:31]      Mg     2.2     [07-05-22 @ 07:31]      Phos  2.7     [07-05-22 @ 07:31]    TPro  7.9  /  Alb  4.9  /  TBili  1.3  /  DBili  x   /  AST  21  /  ALT  41  /  AlkPhos  74  [07-05-22 @ 07:31]    Creatinine Trend:  SCr 1.51 [07-05 @ 07:31]  SCr 1.37 [07-04 @ 07:33]  SCr 1.38 [07-03 @ 07:27]  SCr 1.74 [07-02 @ 07:56]  SCr 1.46 [07-01 @ 07:57]    Urinalysis - [07-03-22 @ 12:05]      Color Light Yellow / Appearance Clear / SG 1.012 / pH 6.5      Gluc Trace / Ketone Negative  / Bili Negative / Urobili Negative       Blood Negative / Protein Trace / Leuk Est Negative / Nitrite Negative      RBC 1 / WBC 0 / Hyaline 1 / Gran  / Sq Epi  / Non Sq Epi 0 / Bacteria Negative      Iron 26, TIBC 295, %sat 9      [05-27-22 @ 17:02]  Ferritin 217      [05-27-22 @ 17:02]  TSH 0.57      [06-22-22 @ 21:13]  Lipid: chol 159, , HDL 29, LDL --      [05-20-22 @ 14:17]

## 2022-07-05 NOTE — PROGRESS NOTE ADULT - PROBLEM SELECTOR PLAN 1
Pt. with hemodynamically mediated JAGRUTI after OHT, in the setting of renal venous congestion and medication induced (tacro, Vanco) Upon review of labs, Scr was 1.2 on 4/19/22 and was elevated at 1.8 on 5/24/22. SCr downtrended to 1.01 on 6/20/22. Pt underwent OHT on 6/21/22 and required IABP post operatively.  UA done on 6/8/22 showed trace blood and proteinuria. Scr uptrended to 1.23 on 6/23/22 with elevated PA pressures. SCr increased to 2.32. Pt also noted to have increased in tacro dose and vancomycin levels were on higher side. Vanco and diuretics were held on 6/27/22. SCr trended to 1.5 today. Now on lasix 40 mg Daily. Repeat UA unremarkable.    Pt is nonoliguric with 2.8L as documented. Continue diuretics and titrate to keep CVP < 10. Labs reviewed. No urgent indication for RRT. Monitor labs and urine output.    Pt. will need follow up with nephrology as outpatient. Can follow up with Dr. Jaramillo in 4 weeks after discharge from the [facility].    If you have any questions, please feel free to contact me  John Fofana  Nephrology Fellow  [phone]; Prefer [employer] TEAMS  (After 5pm or on weekends please page the on-call fellow)

## 2022-07-06 LAB
ALBUMIN SERPL ELPH-MCNC: 4.1 G/DL — SIGNIFICANT CHANGE UP (ref 3.3–5)
ALP SERPL-CCNC: 67 U/L — SIGNIFICANT CHANGE UP (ref 40–120)
ALT FLD-CCNC: 36 U/L — SIGNIFICANT CHANGE UP (ref 10–45)
ANION GAP SERPL CALC-SCNC: 13 MMOL/L — SIGNIFICANT CHANGE UP (ref 5–17)
AST SERPL-CCNC: 19 U/L — SIGNIFICANT CHANGE UP (ref 10–40)
BASOPHILS # BLD AUTO: 0.02 K/UL — SIGNIFICANT CHANGE UP (ref 0–0.2)
BASOPHILS NFR BLD AUTO: 0.1 % — SIGNIFICANT CHANGE UP (ref 0–2)
BILIRUB SERPL-MCNC: 1.3 MG/DL — HIGH (ref 0.2–1.2)
BUN SERPL-MCNC: 64 MG/DL — HIGH (ref 7–23)
CALCIUM SERPL-MCNC: 9.5 MG/DL — SIGNIFICANT CHANGE UP (ref 8.4–10.5)
CHLORIDE SERPL-SCNC: 96 MMOL/L — SIGNIFICANT CHANGE UP (ref 96–108)
CO2 SERPL-SCNC: 22 MMOL/L — SIGNIFICANT CHANGE UP (ref 22–31)
CREAT SERPL-MCNC: 1.54 MG/DL — HIGH (ref 0.5–1.3)
EGFR: 50 ML/MIN/1.73M2 — LOW
EOSINOPHIL # BLD AUTO: 0.03 K/UL — SIGNIFICANT CHANGE UP (ref 0–0.5)
EOSINOPHIL NFR BLD AUTO: 0.2 % — SIGNIFICANT CHANGE UP (ref 0–6)
GLUCOSE BLDC GLUCOMTR-MCNC: 141 MG/DL — HIGH (ref 70–99)
GLUCOSE BLDC GLUCOMTR-MCNC: 149 MG/DL — HIGH (ref 70–99)
GLUCOSE BLDC GLUCOMTR-MCNC: 150 MG/DL — HIGH (ref 70–99)
GLUCOSE BLDC GLUCOMTR-MCNC: 213 MG/DL — HIGH (ref 70–99)
GLUCOSE SERPL-MCNC: 130 MG/DL — HIGH (ref 70–99)
HCT VFR BLD CALC: 38.7 % — LOW (ref 39–50)
HGB BLD-MCNC: 13.3 G/DL — SIGNIFICANT CHANGE UP (ref 13–17)
IMM GRANULOCYTES NFR BLD AUTO: 1 % — SIGNIFICANT CHANGE UP (ref 0–1.5)
LYMPHOCYTES # BLD AUTO: 1.33 K/UL — SIGNIFICANT CHANGE UP (ref 1–3.3)
LYMPHOCYTES # BLD AUTO: 8 % — LOW (ref 13–44)
MAGNESIUM SERPL-MCNC: 2.1 MG/DL — SIGNIFICANT CHANGE UP (ref 1.6–2.6)
MCHC RBC-ENTMCNC: 30 PG — SIGNIFICANT CHANGE UP (ref 27–34)
MCHC RBC-ENTMCNC: 34.4 GM/DL — SIGNIFICANT CHANGE UP (ref 32–36)
MCV RBC AUTO: 87.4 FL — SIGNIFICANT CHANGE UP (ref 80–100)
MONOCYTES # BLD AUTO: 0.83 K/UL — SIGNIFICANT CHANGE UP (ref 0–0.9)
MONOCYTES NFR BLD AUTO: 5 % — SIGNIFICANT CHANGE UP (ref 2–14)
NEUTROPHILS # BLD AUTO: 14.24 K/UL — HIGH (ref 1.8–7.4)
NEUTROPHILS NFR BLD AUTO: 85.7 % — HIGH (ref 43–77)
NRBC # BLD: 0 /100 WBCS — SIGNIFICANT CHANGE UP (ref 0–0)
PHOSPHATE SERPL-MCNC: 3.1 MG/DL — SIGNIFICANT CHANGE UP (ref 2.5–4.5)
PLATELET # BLD AUTO: 246 K/UL — SIGNIFICANT CHANGE UP (ref 150–400)
POTASSIUM SERPL-MCNC: 4.8 MMOL/L — SIGNIFICANT CHANGE UP (ref 3.5–5.3)
POTASSIUM SERPL-SCNC: 4.8 MMOL/L — SIGNIFICANT CHANGE UP (ref 3.5–5.3)
PROT SERPL-MCNC: 6.9 G/DL — SIGNIFICANT CHANGE UP (ref 6–8.3)
RBC # BLD: 4.43 M/UL — SIGNIFICANT CHANGE UP (ref 4.2–5.8)
RBC # FLD: 18.9 % — HIGH (ref 10.3–14.5)
SODIUM SERPL-SCNC: 131 MMOL/L — LOW (ref 135–145)
SURGICAL PATHOLOGY STUDY: SIGNIFICANT CHANGE UP
TACROLIMUS SERPL-MCNC: 15.9 NG/ML — SIGNIFICANT CHANGE UP
VANCOMYCIN TROUGH SERPL-MCNC: 15.2 UG/ML — SIGNIFICANT CHANGE UP (ref 10–20)
WBC # BLD: 16.61 K/UL — HIGH (ref 3.8–10.5)
WBC # FLD AUTO: 16.61 K/UL — HIGH (ref 3.8–10.5)

## 2022-07-06 PROCEDURE — 99233 SBSQ HOSP IP/OBS HIGH 50: CPT

## 2022-07-06 PROCEDURE — 93931 UPPER EXTREMITY STUDY: CPT | Mod: 26

## 2022-07-06 PROCEDURE — 99232 SBSQ HOSP IP/OBS MODERATE 35: CPT | Mod: 24

## 2022-07-06 PROCEDURE — 93970 EXTREMITY STUDY: CPT | Mod: 26

## 2022-07-06 RX ORDER — TACROLIMUS 5 MG/1
2.5 CAPSULE ORAL
Refills: 0 | Status: DISCONTINUED | OUTPATIENT
Start: 2022-07-07 | End: 2022-07-08

## 2022-07-06 RX ORDER — TACROLIMUS 5 MG/1
2 CAPSULE ORAL ONCE
Refills: 0 | Status: COMPLETED | OUTPATIENT
Start: 2022-07-06 | End: 2022-07-06

## 2022-07-06 RX ADMIN — Medication 500000 UNIT(S): at 07:46

## 2022-07-06 RX ADMIN — Medication 2 UNIT(S): at 07:47

## 2022-07-06 RX ADMIN — VALGANCICLOVIR 450 MILLIGRAM(S): 450 TABLET, FILM COATED ORAL at 19:57

## 2022-07-06 RX ADMIN — MYCOPHENOLATE MOFETIL 1000 MILLIGRAM(S): 250 CAPSULE ORAL at 19:58

## 2022-07-06 RX ADMIN — CEFEPIME 100 MILLIGRAM(S): 1 INJECTION, POWDER, FOR SOLUTION INTRAMUSCULAR; INTRAVENOUS at 13:21

## 2022-07-06 RX ADMIN — MYCOPHENOLATE MOFETIL 1000 MILLIGRAM(S): 250 CAPSULE ORAL at 07:46

## 2022-07-06 RX ADMIN — MAGNESIUM OXIDE 400 MG ORAL TABLET 400 MILLIGRAM(S): 241.3 TABLET ORAL at 17:57

## 2022-07-06 RX ADMIN — Medication 10 MILLIGRAM(S): at 13:21

## 2022-07-06 RX ADMIN — Medication 12.5 MILLIGRAM(S): at 19:58

## 2022-07-06 RX ADMIN — MAGNESIUM OXIDE 400 MG ORAL TABLET 400 MILLIGRAM(S): 241.3 TABLET ORAL at 11:47

## 2022-07-06 RX ADMIN — CHLORHEXIDINE GLUCONATE 1 APPLICATION(S): 213 SOLUTION TOPICAL at 05:22

## 2022-07-06 RX ADMIN — CEFEPIME 100 MILLIGRAM(S): 1 INJECTION, POWDER, FOR SOLUTION INTRAMUSCULAR; INTRAVENOUS at 23:07

## 2022-07-06 RX ADMIN — Medication 500000 UNIT(S): at 17:57

## 2022-07-06 RX ADMIN — PANTOPRAZOLE SODIUM 40 MILLIGRAM(S): 20 TABLET, DELAYED RELEASE ORAL at 05:46

## 2022-07-06 RX ADMIN — Medication 500000 UNIT(S): at 11:47

## 2022-07-06 RX ADMIN — MAGNESIUM OXIDE 400 MG ORAL TABLET 400 MILLIGRAM(S): 241.3 TABLET ORAL at 07:46

## 2022-07-06 RX ADMIN — Medication 10 MILLIGRAM(S): at 05:46

## 2022-07-06 RX ADMIN — INSULIN GLARGINE 12 UNIT(S): 100 INJECTION, SOLUTION SUBCUTANEOUS at 23:08

## 2022-07-06 RX ADMIN — TACROLIMUS 3 MILLIGRAM(S): 5 CAPSULE ORAL at 07:46

## 2022-07-06 RX ADMIN — Medication 125 MILLIGRAM(S): at 19:57

## 2022-07-06 RX ADMIN — Medication 2 UNIT(S): at 11:46

## 2022-07-06 RX ADMIN — Medication 2 UNIT(S): at 17:56

## 2022-07-06 RX ADMIN — TACROLIMUS 2 MILLIGRAM(S): 5 CAPSULE ORAL at 19:58

## 2022-07-06 RX ADMIN — VALGANCICLOVIR 450 MILLIGRAM(S): 450 TABLET, FILM COATED ORAL at 07:46

## 2022-07-06 RX ADMIN — HEPARIN SODIUM 5000 UNIT(S): 5000 INJECTION INTRAVENOUS; SUBCUTANEOUS at 23:07

## 2022-07-06 RX ADMIN — Medication 15 MILLIGRAM(S): at 07:46

## 2022-07-06 RX ADMIN — HEPARIN SODIUM 5000 UNIT(S): 5000 INJECTION INTRAVENOUS; SUBCUTANEOUS at 05:46

## 2022-07-06 RX ADMIN — ROSUVASTATIN CALCIUM 5 MILLIGRAM(S): 5 TABLET ORAL at 19:58

## 2022-07-06 RX ADMIN — Medication 10 MILLIGRAM(S): at 23:07

## 2022-07-06 RX ADMIN — Medication 125 MILLIGRAM(S): at 07:45

## 2022-07-06 RX ADMIN — Medication 100 MILLIGRAM(S): at 07:47

## 2022-07-06 RX ADMIN — Medication 1 TABLET(S): at 11:47

## 2022-07-06 RX ADMIN — CEFEPIME 100 MILLIGRAM(S): 1 INJECTION, POWDER, FOR SOLUTION INTRAMUSCULAR; INTRAVENOUS at 05:46

## 2022-07-06 RX ADMIN — Medication 2: at 11:46

## 2022-07-06 RX ADMIN — Medication 100 MILLIGRAM(S): at 20:29

## 2022-07-06 RX ADMIN — HEPARIN SODIUM 5000 UNIT(S): 5000 INJECTION INTRAVENOUS; SUBCUTANEOUS at 13:22

## 2022-07-06 RX ADMIN — Medication 60 MILLIGRAM(S): at 05:46

## 2022-07-06 RX ADMIN — ATOVAQUONE 1500 MILLIGRAM(S): 750 SUSPENSION ORAL at 11:47

## 2022-07-06 RX ADMIN — Medication 500000 UNIT(S): at 19:58

## 2022-07-06 NOTE — PROGRESS NOTE ADULT - PROBLEM SELECTOR PLAN 1
- s/p OHT with Dr. Earl/Maria C on 6/21 (ischemic time 261 min)  - IABP removed on POD 1  - off diuretics at this time, appears euvolemic on exam  - continue sildenafil 10 mg PO TID   - continue procardia 60 mg QD  - CAV ppx: ASA on discharge. Rosuvastatin 5 mg qHS

## 2022-07-06 NOTE — PROGRESS NOTE ADULT - ASSESSMENT
66M Mixed Ischemic/NICM Cardiomyopathy (EF 25-30% at baseline, s/p BIV-ICD), CAD (medically managed MIs in ,, Most recent stent in 2022 to mLAD) presented with multiple near syncope, found to have 41 episodes of VT and admitted initially to cardiac telemetry for further evaluation/management. Ischemic evaluation without new disease and VT ablation without substrate to complete ablation. Transferred from [facility] to [facility] for further management and evaluation for LVAD vs OHT. CT surgery consulted for LVAD/transplant evaluation.   Currently undergoing LVAD/transplant eval  Care per Heart failure and CCU primary team  Preop work up and diagnostics in progress  22 s/pOrthotopic heart transplant, ICD removal, iabp  Post op inotropes /pressors, nitric oxide  /Primacor/insulin  extubated d 1, hi flow   iabp d/c    nitric d/c   s/p biopsy, pw d/c,echo neg effusion  Htn-hydralazine, procardia added increased   JAGRUTI-stable, renal transplant following  hyponatremia, lasix/albumin  L sc thrombus,R cephalic thrombus on heparin sq---> plan to check LE duplex and if + may need full anticoagulation  Insulin infusion remains on  On vanco and cefepime for icd pocket + staph epi and morganella   primacor d/c   Transferred to sdu  As per renal lasix increased 40po bid  Chest tube # 3 d/c   VSS; insulin gttp weaned off overnight;  RSR 80-90; ?d/c med ct today- d/w Dr. Lombardi; LE sono neg DVT; tacro level today 10- continue tacro 4 po bid; vanco trough level 5.6- vanco 500 mg iv bid; change to po abx at home   Discharge planning- home ? fri; ck covid pcr; next biopsy scheduled  Call [facility] cardiology for anticoagulation recs. D/c hydralazine to maximize sildenefil. Hep sq.   D/c one  Ms ct today  Cont bid diuresis   Maintain  ct, if under 100cc can d/c   Hyperkalemia, diet restricted , no furrther intervention, discussed in rounds  Decrease lasix qd ./Ace wrap LE  7/3 VSS WBC up to 19,000 - Mg 1.7 - HF rounds made - plan: mg 2gm ivss x1, start mg ox 400mg po tid. bc x 2 & u/a for leukocytosis. d/c plan ? end of week -  RHC/biospy   VSS - will ck WBC this am - u/a negative - BC testing from yesterday d/t increase WBC- ck covid this am - NPO after midnight for biopsy in am- d/c planning   VSS wbc up to 20 - differential added to am CBC - s/w Mauro in lab - plan: RHC w/ biopsy this am.  CT chest/abd/pelvis per DR. Lombardi.  rpt b/l UE doppplers per Dr. Muse, [facility] cardiolgoy.  d/c plan home end of week ?Friday.  lasix d/c'd for now - will reassess after RHC results    WBC trending down.  CT C/A/P reviewed with Dr Lombardi, , no change in intervention.  < from: CT Chest No Cont (22 @ 17:10) >  Residual fibrous capsule in the regionof the previous cardiac device in   the left chest wall. No evidence of a discrete walled off collection on   this limited noncontrast study.    Interval heart transplant with small pericardial effusion/hemopericardium.    < end of copied text >    Likely transition to oral abx and d/c planning for tomorrow.  His wt is trending down, diuretics currently on hold.  rEPEAT UE /LE DOPPLERS PENDING

## 2022-07-06 NOTE — PROGRESS NOTE ADULT - PROBLEM SELECTOR PLAN 6
- overall stable though slightly elevated, likely related to over diuresis.   - off diuretics, please encourage patient to increase PO intake   - cardiorenal following  - Pt. will need follow up with nephrology as outpatient. Can follow up with Dr. Jaramillo in 4 weeks after discharge from the [facility].

## 2022-07-06 NOTE — PROGRESS NOTE ADULT - SUBJECTIVE AND OBJECTIVE BOX
Vascular Cardiology Consult Note    DIRECT SERVICE NUMBER:  [phone]           [email or web address]   OFFICE [phone]    CC:        Interval Events:   - 7/5/2022: RHC showing low filling pressures and diuretics stopped    Subjective:   - No chest pain or sob  - Feels well, no acute physical complaints        Allergies    No Known Allergies    Intolerances    	    MEDICATIONS:  heparin   Injectable 5000 Unit(s) SubCutaneous every 8 hours  NIFEdipine XL 60 milliGRAM(s) Oral daily  sildenafil (REVATIO) 10 milliGRAM(s) Oral three times a day    atovaquone  Suspension 1500 milliGRAM(s) Oral daily  cefepime   IVPB 1000 milliGRAM(s) IV Intermittent every 8 hours  nystatin    Suspension 053683 Unit(s) Oral <User Schedule>  valGANciclovir 450 milliGRAM(s) Oral every 12 hours  vancomycin    Solution 125 milliGRAM(s) Oral every 12 hours  vancomycin  IVPB 500 milliGRAM(s) IV Intermittent <User Schedule>        pantoprazole    Tablet 40 milliGRAM(s) Oral before breakfast  polyethylene glycol 3350 17 Gram(s) Oral daily    insulin glargine Injectable (LANTUS) 12 Unit(s) SubCutaneous at bedtime  insulin lispro (ADMELOG) corrective regimen sliding scale   SubCutaneous three times a day before meals  insulin lispro (ADMELOG) corrective regimen sliding scale   SubCutaneous at bedtime  insulin lispro Injectable (ADMELOG) 2 Unit(s) SubCutaneous three times a day before meals  predniSONE   Tablet 12.5 milliGRAM(s) Oral every 12 hours  rosuvastatin 5 milliGRAM(s) Oral every 24 hours    chlorhexidine 2% Cloths 1 Application(s) Topical <User Schedule>  magnesium oxide 400 milliGRAM(s) Oral three times a day with meals  multivitamin 1 Tablet(s) Oral daily  mycophenolate mofetil 1000 milliGRAM(s) Oral every 12 hours  tacrolimus 3 milliGRAM(s) Oral every 12 hours      PAST MEDICAL & SURGICAL HISTORY:  AV block      Essential hypertension      Chronic HFrEF (heart failure with reduced ejection fraction)      Chronic kidney disease, unspecified CKD stage      CAD (coronary artery disease)      HLD (hyperlipidemia)      Type 2 diabetes mellitus      Artificial cardiac pacemaker          FAMILY HISTORY:  Family history of acute myocardial infarction (Father)  72        SOCIAL HISTORY:  unchanged    REVIEW OF SYSTEMS:  CONSTITUTIONAL: No fever, weight loss, or fatigue  EYES: No eye pain, visual disturbances, or discharge  ENT:  No difficulty hearing, tinnitus, vertigo; No sinus or throat pain  NECK: No pain or stiffness  RESPIRATORY:    CARDIOVASCULAR:    GASTROINTESTINAL: No abdominal or epigastric pain. No nausea, vomiting, or hematemesis; No diarrhea or constipation. No melena or hematochezia.  GENITOURINARY: No dysuria, frequency, hematuria, or incontinence  NEUROLOGICAL: No headaches, memory loss, loss of strength, numbness, or tremors  SKIN:   LYMPH Nodes: No enlarged glands  ENDOCRINE: No heat or cold intolerance; No hair loss  MUSCULOSKELETAL: No joint pain or swelling; No muscle, back, or extremity pain  PSYCHIATRIC: No depression, anxiety, mood swings, or difficulty sleeping  HEME/LYMPH: No easy bruising, or bleeding gums  ALLERGY AND IMMUNOLOGIC: No hives or eczema	    [ x] All others negative	  [ ] Unable to obtain    PHYSICAL EXAM:  T(C): 36.6 (07-06-22 @ 10:57), Max: 36.6 (07-05-22 @ 19:27)  HR: 93 (07-06-22 @ 10:57) (84 - 103)  BP: 105/57 (07-06-22 @ 10:57) (99/63 - 118/78)  RR: 18 (07-06-22 @ 10:57) (16 - 18)  SpO2: 98% (07-06-22 @ 10:57) (97% - 99%)  Wt(kg): --  I&O's Summary    05 Jul 2022 07:01  -  06 Jul 2022 07:00  --------------------------------------------------------  IN: 250 mL / OUT: 1459 mL / NET: -1209 mL    06 Jul 2022 07:01  -  06 Jul 2022 12:23  --------------------------------------------------------  IN: 340 mL / OUT: 400 mL / NET: -60 mL        Appearance:  	  HEENT:   Normal oral mucosa, PERRL, EOMI	  Carotid:   Right: No bruit   Left:  No bruit   Lymphatic: No lymphadenopathy  Cardiovascular:  Normal s1 and s2 w/o m/r/g, RRR   Respiratory: CTA bilaterally w/o wheezing, rales or rhonchi   Psychiatry:  AAO x 3  Gastrointestinal:  Soft, Non-tender, + BS	  Skin: No rashes, No ecchymoses, No cyanosis	  Neurologic: No focal deficits, Numbness and loss of sensation mildly on L hand   Extremities:      Vascular Pulse Exam:  Right DP: [x]palpable []non-palpable []audible      Left DP :   [x]palpable []non-palpable []audible  Right PT: [x]palpable [] non-palpable []audible   Left PT:  [x] palpable [] non-palpable []audible     Right Radial: [x]palpable []non-palpable []audible      Left Radial:   [x]palpable []non-palpable []audible  Right Ulnar: [x]palpable [] non-palpable []audible   Left Ulnar:  [x] palpable [] non-palpable []audible     R radial pulse significant stronger the L radial pulse     Allens test:   - Positive on both RUE and LUE        Foot Exam:        LABS:	 	    CBC Full  -  ( 06 Jul 2022 07:37 )  WBC Count : 16.61 K/uL  Hemoglobin : 13.3 g/dL  Hematocrit : 38.7 %  Platelet Count - Automated : 246 K/uL  Mean Cell Volume : 87.4 fl  Mean Cell Hemoglobin : 30.0 pg  Mean Cell Hemoglobin Concentration : 34.4 gm/dL  Auto Neutrophil # : 14.24 K/uL  Auto Lymphocyte # : 1.33 K/uL  Auto Monocyte # : 0.83 K/uL  Auto Eosinophil # : 0.03 K/uL  Auto Basophil # : 0.02 K/uL  Auto Neutrophil % : 85.7 %  Auto Lymphocyte % : 8.0 %  Auto Monocyte % : 5.0 %  Auto Eosinophil % : 0.2 %  Auto Basophil % : 0.1 %    07-06    131<L>  |  96  |  64<H>  ----------------------------<  130<H>  4.8   |  22  |  1.54<H>  07-05    136  |  97  |  52<H>  ----------------------------<  114<H>  5.2   |  24  |  1.51<H>    Ca    9.5      06 Jul 2022 07:38  Ca    10.4      05 Jul 2022 07:31  Phos  3.1     07-06  Phos  2.7     07-05  Mg     2.1     07-06  Mg     2.2     07-05    TPro  6.9  /  Alb  4.1  /  TBili  1.3<H>  /  DBili  x   /  AST  19  /  ALT  36  /  AlkPhos  67  07-06  TPro  7.9  /  Alb  4.9  /  TBili  1.3<H>  /  DBili  x   /  AST  21  /  ALT  41  /  AlkPhos  74  07-05         Vascular Cardiology Consult Note    DIRECT SERVICE NUMBER:  [phone]           [email or web address]   OFFICE [phone]    CC:        Interval Events:   - 7/5/2022: RHC showing low filling pressures and diuretics stopped    Subjective:   - No chest pain or sob  - Feels well, no acute physical complaints        Allergies    No Known Allergies    Intolerances    	    MEDICATIONS:  heparin   Injectable 5000 Unit(s) SubCutaneous every 8 hours  NIFEdipine XL 60 milliGRAM(s) Oral daily  sildenafil (REVATIO) 10 milliGRAM(s) Oral three times a day    atovaquone  Suspension 1500 milliGRAM(s) Oral daily  cefepime   IVPB 1000 milliGRAM(s) IV Intermittent every 8 hours  nystatin    Suspension 452689 Unit(s) Oral <User Schedule>  valGANciclovir 450 milliGRAM(s) Oral every 12 hours  vancomycin    Solution 125 milliGRAM(s) Oral every 12 hours  vancomycin  IVPB 500 milliGRAM(s) IV Intermittent <User Schedule>        pantoprazole    Tablet 40 milliGRAM(s) Oral before breakfast  polyethylene glycol 3350 17 Gram(s) Oral daily    insulin glargine Injectable (LANTUS) 12 Unit(s) SubCutaneous at bedtime  insulin lispro (ADMELOG) corrective regimen sliding scale   SubCutaneous three times a day before meals  insulin lispro (ADMELOG) corrective regimen sliding scale   SubCutaneous at bedtime  insulin lispro Injectable (ADMELOG) 2 Unit(s) SubCutaneous three times a day before meals  predniSONE   Tablet 12.5 milliGRAM(s) Oral every 12 hours  rosuvastatin 5 milliGRAM(s) Oral every 24 hours    chlorhexidine 2% Cloths 1 Application(s) Topical <User Schedule>  magnesium oxide 400 milliGRAM(s) Oral three times a day with meals  multivitamin 1 Tablet(s) Oral daily  mycophenolate mofetil 1000 milliGRAM(s) Oral every 12 hours  tacrolimus 3 milliGRAM(s) Oral every 12 hours      PAST MEDICAL & SURGICAL HISTORY:  AV block      Essential hypertension      Chronic HFrEF (heart failure with reduced ejection fraction)      Chronic kidney disease, unspecified CKD stage      CAD (coronary artery disease)      HLD (hyperlipidemia)      Type 2 diabetes mellitus      Artificial cardiac pacemaker          FAMILY HISTORY:  Family history of acute myocardial infarction (Father)  72        SOCIAL HISTORY:  unchanged    REVIEW OF SYSTEMS:  CONSTITUTIONAL: No fever, weight loss, or fatigue  EYES: No eye pain, visual disturbances, or discharge  ENT:  No difficulty hearing, tinnitus, vertigo; No sinus or throat pain  NECK: No pain or stiffness  RESPIRATORY:  No sob or dacosta   CARDIOVASCULAR:  No chest pain or palpitations   GASTROINTESTINAL: No abdominal or epigastric pain. No nausea, vomiting, or hematemesis; No diarrhea or constipation. No melena or hematochezia.  GENITOURINARY: No dysuria, frequency, hematuria, or incontinence  NEUROLOGICAL: No headaches, memory loss, loss of strength, numbness, or tremors  LYMPH Nodes: No enlarged glands  ENDOCRINE: No heat or cold intolerance; No hair loss  MUSCULOSKELETAL: No joint pain or swelling; No muscle, back, or extremity pain  PSYCHIATRIC: No depression, anxiety, mood swings, or difficulty sleeping  HEME/LYMPH: No easy bruising, or bleeding gums  ALLERGY AND IMMUNOLOGIC: No hives or eczema	    [ x] All others negative	  [ ] Unable to obtain    PHYSICAL EXAM:  T(C): 36.6 (07-06-22 @ 10:57), Max: 36.6 (07-05-22 @ 19:27)  HR: 93 (07-06-22 @ 10:57) (84 - 103)  BP: 105/57 (07-06-22 @ 10:57) (99/63 - 118/78)  RR: 18 (07-06-22 @ 10:57) (16 - 18)  SpO2: 98% (07-06-22 @ 10:57) (97% - 99%)  Wt(kg): --  I&O's Summary    05 Jul 2022 07:01  -  06 Jul 2022 07:00  --------------------------------------------------------  IN: 250 mL / OUT: 1459 mL / NET: -1209 mL    06 Jul 2022 07:01  -  06 Jul 2022 12:23  --------------------------------------------------------  IN: 340 mL / OUT: 400 mL / NET: -60 mL        Appearance:  	  HEENT:   Normal oral mucosa, PERRL, EOMI	  Carotid:   Right: No bruit   Left:  No bruit   Lymphatic: No lymphadenopathy  Cardiovascular: Normal s1 and s2 w/o murmur, rubs or gallops   Respiratory: CTA bilaterally w/o wheezing, rales or rhonchi   Psychiatry:  AAO x 3  Gastrointestinal:  Soft, Non-tender, + BS	  Skin: No rashes, No ecchymoses, No cyanosis	  Neurologic: No focal deficits, Numbness and loss of sensation mildly on L hand   Extremities:      Vascular Pulse Exam:  Right DP: [x]palpable []non-palpable []audible      Left DP :   [x]palpable []non-palpable []audible  Right PT: [x]palpable [] non-palpable []audible   Left PT:  [x] palpable [] non-palpable []audible     Right Radial: [x]palpable []non-palpable []audible      Left Radial:   [x]palpable []non-palpable []audible  Right Ulnar: [x]palpable [] non-palpable []audible   Left Ulnar:  [x] palpable [] non-palpable []audible     R radial pulse significant stronger the L radial pulse     Allens test:   - Positive on both RUE and LUE          LABS:	 	    CBC Full  -  ( 06 Jul 2022 07:37 )  WBC Count : 16.61 K/uL  Hemoglobin : 13.3 g/dL  Hematocrit : 38.7 %  Platelet Count - Automated : 246 K/uL  Mean Cell Volume : 87.4 fl  Mean Cell Hemoglobin : 30.0 pg  Mean Cell Hemoglobin Concentration : 34.4 gm/dL  Auto Neutrophil # : 14.24 K/uL  Auto Lymphocyte # : 1.33 K/uL  Auto Monocyte # : 0.83 K/uL  Auto Eosinophil # : 0.03 K/uL  Auto Basophil # : 0.02 K/uL  Auto Neutrophil % : 85.7 %  Auto Lymphocyte % : 8.0 %  Auto Monocyte % : 5.0 %  Auto Eosinophil % : 0.2 %  Auto Basophil % : 0.1 %    07-06    131<L>  |  96  |  64<H>  ----------------------------<  130<H>  4.8   |  22  |  1.54<H>  07-05    136  |  97  |  52<H>  ----------------------------<  114<H>  5.2   |  24  |  1.51<H>    Ca    9.5      06 Jul 2022 07:38  Ca    10.4      05 Jul 2022 07:31  Phos  3.1     07-06  Phos  2.7     07-05  Mg     2.1     07-06  Mg     2.2     07-05    TPro  6.9  /  Alb  4.1  /  TBili  1.3<H>  /  DBili  x   /  AST  19  /  ALT  36  /  AlkPhos  67  07-06  TPro  7.9  /  Alb  4.9  /  TBili  1.3<H>  /  DBili  x   /  AST  21  /  ALT  41  /  AlkPhos  74  07-05

## 2022-07-06 NOTE — PHARMACY EDUCATION NOTE - EDUCATION SUMMARY
The patient is post heart transplant. Spoke to patient's wife who is a nurse regarding discharge plan focusing on medications. Reviewed immunosuppression and anti-infective prophylaxis therapies. Discussed tacrolimus dose adjustments and monitoring. Reviewed duration of therapies - lifelong immunosuppression and specific duration of therapies to prevent infection in post-transplant setting. Will continue education. The wife is familiar with insulin administration, and she will assist the patient. Will meet the wife in person on 7/7 to do education at the bedside with her .     Shanita Richardson, Pharm.D.  [phone]

## 2022-07-06 NOTE — PROGRESS NOTE ADULT - ASSESSMENT
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 LHC did not reveal new obstructive CAD and he underwent EPS which did not reveal endocardial substrate amenable for ablation. RHC revealed severely depressed cardiac output and he was transferred to [facility] 5/26 for advanced therapies evaluation.    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 He improved significantly with sequential uptitration of inotropes and increased pacing rate, but on 6/6 he had worsening JAGRUTI. He is s/p RHC which revealed severely elevated filling pressures, severe cpcPH, and CI of 1.9 on milrinone 0.5. IABP was placed and his renal function/PAPs have improved. He is MCS dependent and was inadequately supported with high dose inotropes, so was listed UNOS status 2e for OHT, awaiting suitable donor. However, was made status 7 as he became febrile, last 6/7 T 38. He has been afebrile since and blood cultures from 6/7 with NGTD x 48 hours and his leukocytosis has not worsened. Discussed with transplant ID and since bld cx negative x48hrs he has been re-listed UNOS status 2e.     A suitable donor was identified and patient underwent OHT with Dr. Earl/Maria C on 6/21 (ischemic time 261 mins, CMV +/+, Toxo -/-). Patient was successfully extubated and IABP was removed on POD 1. Off Maricruz. Cultures from his ICD site in the OR returned positive for staph epidermidis/ morganella morganii for which is being treated with IV Vancomycin and Cefepime. CT scan was completed residual fibrous capsule in the region of the previous cardiac device in  the left chest wall. No evidence of a discrete collection. His leukocytosis is overall improving and remains afebrile. His renal function is slightly above baseline, likely related to over diuresis. Hopeful for discharge home tomorrow.     RHC/EMBx  6/28: RA 9, RV 3/11, PA 62/27/41, wedge 21 with v wave 31, PA sat 69.3%, /81/102, CO/Ci 6.87/3.62, grade 1R/2  7/5: RA 2, RV 35/3, PA 36/18/26, Wedge 15, PA sat 71.7, Milana CO/CI 5.4/2.8, /75/93, SVR 1345 dsc       Review of studies  TTE 5/21: LV 5.2 cm, LVEF 10-15%, LVOT VTI 10 cm, moderate RV dysfunction, mild AI, minimal MR  EKG: a-BiV paced  C 5/23: patent mLAD stent with slow flow, D1 with 40-50% stenosis, mild disease otherwise  RHC 5/26: RA 12, PA 70/40 (50), PCWP 22, Milana CO/CI 2.3/1.3, MAP 83 with SVR 2469, PVR 12 PERSAUD

## 2022-07-06 NOTE — PROGRESS NOTE ADULT - SUBJECTIVE AND OBJECTIVE BOX
Subjective:    Medications:  atovaquone  Suspension 1500 milliGRAM(s) Oral daily  cefepime   IVPB 1000 milliGRAM(s) IV Intermittent every 8 hours  chlorhexidine 2% Cloths 1 Application(s) Topical <User Schedule>  heparin   Injectable 5000 Unit(s) SubCutaneous every 8 hours  insulin glargine Injectable (LANTUS) 12 Unit(s) SubCutaneous at bedtime  insulin lispro (ADMELOG) corrective regimen sliding scale   SubCutaneous three times a day before meals  insulin lispro (ADMELOG) corrective regimen sliding scale   SubCutaneous at bedtime  insulin lispro Injectable (ADMELOG) 2 Unit(s) SubCutaneous three times a day before meals  magnesium oxide 400 milliGRAM(s) Oral three times a day with meals  multivitamin 1 Tablet(s) Oral daily  mycophenolate mofetil 1000 milliGRAM(s) Oral every 12 hours  NIFEdipine XL 60 milliGRAM(s) Oral daily  nystatin    Suspension 504514 Unit(s) Oral <User Schedule>  pantoprazole    Tablet 40 milliGRAM(s) Oral before breakfast  polyethylene glycol 3350 17 Gram(s) Oral daily  predniSONE   Tablet 12.5 milliGRAM(s) Oral every 12 hours  rosuvastatin 5 milliGRAM(s) Oral every 24 hours  sildenafil (REVATIO) 10 milliGRAM(s) Oral three times a day  tacrolimus 3 milliGRAM(s) Oral every 12 hours  valGANciclovir 450 milliGRAM(s) Oral every 12 hours  vancomycin    Solution 125 milliGRAM(s) Oral every 12 hours  vancomycin  IVPB 500 milliGRAM(s) IV Intermittent <User Schedule>      Physical Exam:    Vitals:  Vital Signs Last 24 Hours  T(C): 36.6 (22 @ 10:57), Max: 36.6 (22 @ 19:27)  HR: 93 (22 @ 10:57) (84 - 103)  BP: 105/57 (22 @ 10:57) (99/63 - 118/78)  RR: 18 (22 @ 10:57) (16 - 18)  SpO2: 98% (22 @ 10:57) (97% - 99%)    Weight in k.6 ( @ 05:20)    I&O's Summary    2022 07:01  -  2022 07:00  --------------------------------------------------------  IN: 250 mL / OUT: 1459 mL / NET: -1209 mL    2022 07:01  -  2022 12:30  --------------------------------------------------------  IN: 340 mL / OUT: 400 mL / NET: -60 mL        Physical Exam  General: No distress. Comfortable.  Neck: JVP flat   Chest: Clear to auscultation bilaterally  CV: Normal S1 and S2. No murmurs, rub, or gallops. Radial pulses normal.  Abdomen: Soft, non-distended, non-tender  Neurology: Alert and oriented times three. Sensation intact    Labs:                        13.3   16.61 )-----------( 246      ( 2022 07:37 )             38.7     07-06    131<L>  |  96  |  64<H>  ----------------------------<  130<H>  4.8   |  22  |  1.54<H>    Ca    9.5      2022 07:38  Phos  3.1     07-06  Mg     2.1     07-    TPro  6.9  /  Alb  4.1  /  TBili  1.3<H>  /  DBili  x   /  AST  19  /  ALT  36  /  AlkPhos  67  07-     Subjective: No acute complaints. Walking with no chest pain or SOB. No dizziness. No abdominal complaints.     Medications:  atovaquone  Suspension 1500 milliGRAM(s) Oral daily  cefepime   IVPB 1000 milliGRAM(s) IV Intermittent every 8 hours  chlorhexidine 2% Cloths 1 Application(s) Topical <User Schedule>  heparin   Injectable 5000 Unit(s) SubCutaneous every 8 hours  insulin glargine Injectable (LANTUS) 12 Unit(s) SubCutaneous at bedtime  insulin lispro (ADMELOG) corrective regimen sliding scale   SubCutaneous three times a day before meals  insulin lispro (ADMELOG) corrective regimen sliding scale   SubCutaneous at bedtime  insulin lispro Injectable (ADMELOG) 2 Unit(s) SubCutaneous three times a day before meals  magnesium oxide 400 milliGRAM(s) Oral three times a day with meals  multivitamin 1 Tablet(s) Oral daily  mycophenolate mofetil 1000 milliGRAM(s) Oral every 12 hours  NIFEdipine XL 60 milliGRAM(s) Oral daily  nystatin    Suspension 482725 Unit(s) Oral <User Schedule>  pantoprazole    Tablet 40 milliGRAM(s) Oral before breakfast  polyethylene glycol 3350 17 Gram(s) Oral daily  predniSONE   Tablet 12.5 milliGRAM(s) Oral every 12 hours  rosuvastatin 5 milliGRAM(s) Oral every 24 hours  sildenafil (REVATIO) 10 milliGRAM(s) Oral three times a day  tacrolimus 3 milliGRAM(s) Oral every 12 hours  valGANciclovir 450 milliGRAM(s) Oral every 12 hours  vancomycin    Solution 125 milliGRAM(s) Oral every 12 hours  vancomycin  IVPB 500 milliGRAM(s) IV Intermittent <User Schedule>      Physical Exam:    Vitals:  Vital Signs Last 24 Hours  T(C): 36.6 (22 @ 10:57), Max: 36.6 (22 @ 19:27)  HR: 93 (22 @ 10:57) (84 - 103)  BP: 105/57 (22 @ 10:57) (99/63 - 118/78)  RR: 18 (22 @ 10:57) (16 - 18)  SpO2: 98% (22 @ 10:57) (97% - 99%)    Weight in k.6 ( @ 05:20)    I&O's Summary    2022 07:01  -  2022 07:00  --------------------------------------------------------  IN: 250 mL / OUT: 1459 mL / NET: -1209 mL    2022 07:01  -  2022 12:30  --------------------------------------------------------  IN: 340 mL / OUT: 400 mL / NET: -60 mL        Physical Exam  General: No distress. Comfortable.  Neck: JVP flat   Chest: Clear to auscultation bilaterally  CV: Normal S1 and S2. No murmurs, rub, or gallops. Radial pulses normal.  Abdomen: Soft, non-distended, non-tender  Neurology: Alert and oriented times three. Sensation intact    Labs:                        13.3   16.61 )-----------( 246      ( 2022 07:37 )             38.7     07-06    131<L>  |  96  |  64<H>  ----------------------------<  130<H>  4.8   |  22  |  1.54<H>    Ca    9.5      2022 07:38  Phos  3.1     07-06  Mg     2.1     -    TPro  6.9  /  Alb  4.1  /  TBili  1.3<H>  /  DBili  x   /  AST  19  /  ALT  36  /  AlkPhos  67  07-

## 2022-07-06 NOTE — PROGRESS NOTE ADULT - PROBLEM SELECTOR PLAN 4
- postop course c/b leukocytosis, though overall improving   - ICD pocket culture positive for staph epidermidis/ morganella morganii  - remains on Cefepime and IV Vancomycin. To complete 2-3 week course post ICD explant. Can switch to Cefpodoxime and Doxy at time of discharge per ID  - CT C/A/P 7/5 showed residual fibrous capsule in the region of the previous cardiac device in the left chest wall. No evidence of a discrete walled off collection on  this limited noncontrast study

## 2022-07-06 NOTE — PROGRESS NOTE ADULT - NS ATTEND AMEND GEN_ALL_CORE FT
67 y/o M with h/o HFrEF, admitted with VT and cardiogenic shock, now s/p OHT 6/21/2022 (CMV +/+, Toxo -/-).   RHC/biopsy yesterday with low filling pressures, no rejection. PA pressures improved from last week since starting Sildenafil.   Stopped diuretics and encouraging PO intake.   Continue Tacrolimus (goal trough 12-14), CellCept 1000mg BID, Prednisone taper. Can decrease Prednisone dose today given negative biopsy yesterday. For prophylaxis, continue Atovaquone, Nystatin, Valcyte. Continue statin and ASA on discharge.

## 2022-07-06 NOTE — PROGRESS NOTE ADULT - PROBLEM SELECTOR PLAN 1
·  Plan: - s/p OHT with Dr. Earl/Marai C on 6/21 (ischemic time 261 min)  - IABP removed on POD 1  - d/c primacor today 6/29  - adjust tacro.   7/5 hold lasix until RHC results  - continue hydralazine 25 mg PO TID and Procardia 60mg PO QD.--7/1 d/c hydralazine

## 2022-07-06 NOTE — PROGRESS NOTE ADULT - ASSESSMENT
CASE HAS NOT BEEN DISCUSSED WITH THE ATTENDING   Assessment:  1. 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 Superficial R UE VT  - 06/28/22: R Basilic, cephalic and superficial veins thrombosed    3. S/p OHT on 6/21  4. JAGRUTI     Plan:  1. C/w Heparin Subq 5000 units TID.  2. O2 saturation normal on RA   3.   4. Appreciate excellent CT surgery care    Thank you  Jonathan Lima MD   Internal Medicine PGY3 +    Please call with any questions:   Service Line: [phone]  Office [phone] Assessment:  1. 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 Superficial R UE VT  - 06/28/22: R Basilic, cephalic and superficial veins thrombosed    3. S/p OHT on 6/21  4. JAGRUTI     Plan:  1. C/w Heparin Subq 5000 units TID.  2. O2 saturation normal on RA   3. Will follow up repeat duplex to reassess left subclavian thrombus   4. Appreciate excellent CT surgery care    Thank you  Jonathan Lima MD   Internal Medicine PGY3     Please call with any questions:   Service Line: [phone]  Office [phone]

## 2022-07-06 NOTE — PROGRESS NOTE ADULT - SUBJECTIVE AND OBJECTIVE BOX
VITAL SIGNS    Telemetry:  NSR 80    Vital Signs Last 24 Hrs  T(C): 36.6 (22 @ 10:57), Max: 36.6 (22 @ 19:27)  T(F): 97.8 (22 @ 10:57), Max: 97.8 (22 @ 19:27)  HR: 93 (22 @ 10:57) (84 - 103)  BP: 105/57 (22 @ 10:57) (99/63 - 118/78)  RR: 18 (22 @ 10:57) (16 - 18)  SpO2: 98% (22 @ 10:57) (97% - 99%)                    @ 07:01  -   @ 07:00  --------------------------------------------------------  IN: 250 mL / OUT: 1459 mL / NET: -1209 mL     @ 07:01  -   @ 12:26  --------------------------------------------------------  IN: 340 mL / OUT: 400 mL / NET: -60 mL          Daily     Daily Weight in k.6 (2022 05:20)            CAPILLARY BLOOD GLUCOSE      POCT Blood Glucose.: 213 mg/dL (2022 11:39)  POCT Blood Glucose.: 141 mg/dL (2022 06:57)  POCT Blood Glucose.: 104 mg/dL (2022 20:53)  POCT Blood Glucose.: 112 mg/dL (2022 17:45)  POCT Blood Glucose.: 172 mg/dL (2022 12:43)          Pacing Wires         Coumadin    [ ] YES          [  X ]      NO                                   PHYSICAL EXAM        Neurology: alert and oriented x 3, nonfocal, no gross deficits  CV : s1 s2 RRR  Sternal Wound :  CDI , Stable  Lungs: cta  Abdomen: soft, nontender, nondistended, positive bowel sounds, last bowel movement +                   :    voiding     Extremities:   TRACE   edema   /  -   calve tenderness ,            atovaquone  Suspension 1500 milliGRAM(s) Oral daily  cefepime   IVPB 1000 milliGRAM(s) IV Intermittent every 8 hours  chlorhexidine 2% Cloths 1 Application(s) Topical <User Schedule>  heparin   Injectable 5000 Unit(s) SubCutaneous every 8 hours  insulin glargine Injectable (LANTUS) 12 Unit(s) SubCutaneous at bedtime  insulin lispro (ADMELOG) corrective regimen sliding scale   SubCutaneous three times a day before meals  insulin lispro (ADMELOG) corrective regimen sliding scale   SubCutaneous at bedtime  insulin lispro Injectable (ADMELOG) 2 Unit(s) SubCutaneous three times a day before meals  magnesium oxide 400 milliGRAM(s) Oral three times a day with meals  multivitamin 1 Tablet(s) Oral daily  mycophenolate mofetil 1000 milliGRAM(s) Oral every 12 hours  NIFEdipine XL 60 milliGRAM(s) Oral daily  nystatin    Suspension 101063 Unit(s) Oral <User Schedule>  pantoprazole    Tablet 40 milliGRAM(s) Oral before breakfast  polyethylene glycol 3350 17 Gram(s) Oral daily  predniSONE   Tablet 12.5 milliGRAM(s) Oral every 12 hours  rosuvastatin 5 milliGRAM(s) Oral every 24 hours  sildenafil (REVATIO) 10 milliGRAM(s) Oral three times a day  tacrolimus 3 milliGRAM(s) Oral every 12 hours  valGANciclovir 450 milliGRAM(s) Oral every 12 hours  vancomycin    Solution 125 milliGRAM(s) Oral every 12 hours  vancomycin  IVPB 500 milliGRAM(s) IV Intermittent <User Schedule>                    Physical Therapy Rec:   Home  [  ]   Home w/ PT  [  ]  Rehab  [  ]  Discussed with Cardiothoracic Team at AM rounds.

## 2022-07-06 NOTE — PROGRESS NOTE ADULT - PROBLEM SELECTOR PLAN 5
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 Gracia following, plan to repeat UE duplex b/l today  - currently on subq heparin

## 2022-07-07 ENCOUNTER — TRANSCRIPTION ENCOUNTER (OUTPATIENT)
Age: 67
End: 2022-07-07

## 2022-07-07 DIAGNOSIS — E87.5 HYPERKALEMIA: ICD-10-CM

## 2022-07-07 DIAGNOSIS — R73.9 HYPERGLYCEMIA, UNSPECIFIED: ICD-10-CM

## 2022-07-07 LAB
ALBUMIN SERPL ELPH-MCNC: 4.2 G/DL — SIGNIFICANT CHANGE UP (ref 3.3–5)
ALP SERPL-CCNC: 74 U/L — SIGNIFICANT CHANGE UP (ref 40–120)
ALT FLD-CCNC: 38 U/L — SIGNIFICANT CHANGE UP (ref 10–45)
ANION GAP SERPL CALC-SCNC: 12 MMOL/L — SIGNIFICANT CHANGE UP (ref 5–17)
ANION GAP SERPL CALC-SCNC: 12 MMOL/L — SIGNIFICANT CHANGE UP (ref 5–17)
ANION GAP SERPL CALC-SCNC: 15 MMOL/L — SIGNIFICANT CHANGE UP (ref 5–17)
AST SERPL-CCNC: 17 U/L — SIGNIFICANT CHANGE UP (ref 10–40)
BILIRUB SERPL-MCNC: 0.8 MG/DL — SIGNIFICANT CHANGE UP (ref 0.2–1.2)
BUN SERPL-MCNC: 46 MG/DL — HIGH (ref 7–23)
BUN SERPL-MCNC: 49 MG/DL — HIGH (ref 7–23)
BUN SERPL-MCNC: 55 MG/DL — HIGH (ref 7–23)
CALCIUM SERPL-MCNC: 9.1 MG/DL — SIGNIFICANT CHANGE UP (ref 8.4–10.5)
CALCIUM SERPL-MCNC: 9.2 MG/DL — SIGNIFICANT CHANGE UP (ref 8.4–10.5)
CALCIUM SERPL-MCNC: 9.7 MG/DL — SIGNIFICANT CHANGE UP (ref 8.4–10.5)
CHLORIDE SERPL-SCNC: 100 MMOL/L — SIGNIFICANT CHANGE UP (ref 96–108)
CHLORIDE SERPL-SCNC: 96 MMOL/L — SIGNIFICANT CHANGE UP (ref 96–108)
CHLORIDE SERPL-SCNC: 99 MMOL/L — SIGNIFICANT CHANGE UP (ref 96–108)
CO2 SERPL-SCNC: 18 MMOL/L — LOW (ref 22–31)
CO2 SERPL-SCNC: 20 MMOL/L — LOW (ref 22–31)
CO2 SERPL-SCNC: 21 MMOL/L — LOW (ref 22–31)
CREAT SERPL-MCNC: 1.13 MG/DL — SIGNIFICANT CHANGE UP (ref 0.5–1.3)
CREAT SERPL-MCNC: 1.18 MG/DL — SIGNIFICANT CHANGE UP (ref 0.5–1.3)
CREAT SERPL-MCNC: 1.32 MG/DL — HIGH (ref 0.5–1.3)
EGFR: 60 ML/MIN/1.73M2 — SIGNIFICANT CHANGE UP
EGFR: 69 ML/MIN/1.73M2 — SIGNIFICANT CHANGE UP
EGFR: 73 ML/MIN/1.73M2 — SIGNIFICANT CHANGE UP
GLUCOSE BLDC GLUCOMTR-MCNC: 100 MG/DL — HIGH (ref 70–99)
GLUCOSE BLDC GLUCOMTR-MCNC: 126 MG/DL — HIGH (ref 70–99)
GLUCOSE BLDC GLUCOMTR-MCNC: 155 MG/DL — HIGH (ref 70–99)
GLUCOSE BLDC GLUCOMTR-MCNC: 204 MG/DL — HIGH (ref 70–99)
GLUCOSE SERPL-MCNC: 131 MG/DL — HIGH (ref 70–99)
GLUCOSE SERPL-MCNC: 131 MG/DL — HIGH (ref 70–99)
GLUCOSE SERPL-MCNC: 202 MG/DL — HIGH (ref 70–99)
HCT VFR BLD CALC: 39.7 % — SIGNIFICANT CHANGE UP (ref 39–50)
HGB BLD-MCNC: 13.4 G/DL — SIGNIFICANT CHANGE UP (ref 13–17)
MAGNESIUM SERPL-MCNC: 2 MG/DL — SIGNIFICANT CHANGE UP (ref 1.6–2.6)
MCHC RBC-ENTMCNC: 29.9 PG — SIGNIFICANT CHANGE UP (ref 27–34)
MCHC RBC-ENTMCNC: 33.8 GM/DL — SIGNIFICANT CHANGE UP (ref 32–36)
MCV RBC AUTO: 88.6 FL — SIGNIFICANT CHANGE UP (ref 80–100)
NRBC # BLD: 0 /100 WBCS — SIGNIFICANT CHANGE UP (ref 0–0)
PHOSPHATE SERPL-MCNC: 2.5 MG/DL — SIGNIFICANT CHANGE UP (ref 2.5–4.5)
PLATELET # BLD AUTO: 224 K/UL — SIGNIFICANT CHANGE UP (ref 150–400)
POTASSIUM SERPL-MCNC: 4.7 MMOL/L — SIGNIFICANT CHANGE UP (ref 3.5–5.3)
POTASSIUM SERPL-MCNC: 5.1 MMOL/L — SIGNIFICANT CHANGE UP (ref 3.5–5.3)
POTASSIUM SERPL-MCNC: 5.5 MMOL/L — HIGH (ref 3.5–5.3)
POTASSIUM SERPL-MCNC: 5.6 MMOL/L — HIGH (ref 3.5–5.3)
POTASSIUM SERPL-SCNC: 4.7 MMOL/L — SIGNIFICANT CHANGE UP (ref 3.5–5.3)
POTASSIUM SERPL-SCNC: 5.1 MMOL/L — SIGNIFICANT CHANGE UP (ref 3.5–5.3)
POTASSIUM SERPL-SCNC: 5.5 MMOL/L — HIGH (ref 3.5–5.3)
POTASSIUM SERPL-SCNC: 5.6 MMOL/L — HIGH (ref 3.5–5.3)
PROT SERPL-MCNC: 6.7 G/DL — SIGNIFICANT CHANGE UP (ref 6–8.3)
RBC # BLD: 4.48 M/UL — SIGNIFICANT CHANGE UP (ref 4.2–5.8)
RBC # FLD: 18.8 % — HIGH (ref 10.3–14.5)
SODIUM SERPL-SCNC: 129 MMOL/L — LOW (ref 135–145)
SODIUM SERPL-SCNC: 132 MMOL/L — LOW (ref 135–145)
SODIUM SERPL-SCNC: 132 MMOL/L — LOW (ref 135–145)
TACROLIMUS SERPL-MCNC: 12.2 NG/ML — SIGNIFICANT CHANGE UP
VANCOMYCIN TROUGH SERPL-MCNC: 12.6 UG/ML — SIGNIFICANT CHANGE UP (ref 10–20)
WBC # BLD: 14.54 K/UL — HIGH (ref 3.8–10.5)
WBC # FLD AUTO: 14.54 K/UL — HIGH (ref 3.8–10.5)

## 2022-07-07 PROCEDURE — 71045 X-RAY EXAM CHEST 1 VIEW: CPT | Mod: 26

## 2022-07-07 PROCEDURE — 99233 SBSQ HOSP IP/OBS HIGH 50: CPT

## 2022-07-07 PROCEDURE — 99233 SBSQ HOSP IP/OBS HIGH 50: CPT | Mod: GC

## 2022-07-07 PROCEDURE — 99232 SBSQ HOSP IP/OBS MODERATE 35: CPT

## 2022-07-07 RX ORDER — SODIUM ZIRCONIUM CYCLOSILICATE 10 G/10G
10 POWDER, FOR SUSPENSION ORAL
Refills: 0 | Status: DISCONTINUED | OUTPATIENT
Start: 2022-07-07 | End: 2022-07-08

## 2022-07-07 RX ORDER — INSULIN GLARGINE 100 [IU]/ML
10 INJECTION, SOLUTION SUBCUTANEOUS AT BEDTIME
Refills: 0 | Status: DISCONTINUED | OUTPATIENT
Start: 2022-07-07 | End: 2022-07-08

## 2022-07-07 RX ORDER — SODIUM ZIRCONIUM CYCLOSILICATE 10 G/10G
10 POWDER, FOR SUSPENSION ORAL ONCE
Refills: 0 | Status: COMPLETED | OUTPATIENT
Start: 2022-07-07 | End: 2022-07-07

## 2022-07-07 RX ORDER — APIXABAN 2.5 MG/1
2.5 TABLET, FILM COATED ORAL EVERY 12 HOURS
Refills: 0 | Status: DISCONTINUED | OUTPATIENT
Start: 2022-07-07 | End: 2022-07-08

## 2022-07-07 RX ADMIN — Medication 500000 UNIT(S): at 11:55

## 2022-07-07 RX ADMIN — Medication 1 TABLET(S): at 11:56

## 2022-07-07 RX ADMIN — Medication 125 MILLIGRAM(S): at 20:00

## 2022-07-07 RX ADMIN — MAGNESIUM OXIDE 400 MG ORAL TABLET 400 MILLIGRAM(S): 241.3 TABLET ORAL at 17:32

## 2022-07-07 RX ADMIN — Medication 10 MILLIGRAM(S): at 06:01

## 2022-07-07 RX ADMIN — MAGNESIUM OXIDE 400 MG ORAL TABLET 400 MILLIGRAM(S): 241.3 TABLET ORAL at 11:56

## 2022-07-07 RX ADMIN — Medication 500000 UNIT(S): at 17:32

## 2022-07-07 RX ADMIN — CEFEPIME 100 MILLIGRAM(S): 1 INJECTION, POWDER, FOR SOLUTION INTRAMUSCULAR; INTRAVENOUS at 13:04

## 2022-07-07 RX ADMIN — APIXABAN 2.5 MILLIGRAM(S): 2.5 TABLET, FILM COATED ORAL at 18:07

## 2022-07-07 RX ADMIN — Medication 500000 UNIT(S): at 20:01

## 2022-07-07 RX ADMIN — Medication 60 MILLIGRAM(S): at 06:03

## 2022-07-07 RX ADMIN — TACROLIMUS 2.5 MILLIGRAM(S): 5 CAPSULE ORAL at 20:01

## 2022-07-07 RX ADMIN — ATOVAQUONE 1500 MILLIGRAM(S): 750 SUSPENSION ORAL at 11:56

## 2022-07-07 RX ADMIN — HEPARIN SODIUM 5000 UNIT(S): 5000 INJECTION INTRAVENOUS; SUBCUTANEOUS at 13:04

## 2022-07-07 RX ADMIN — Medication 500000 UNIT(S): at 07:49

## 2022-07-07 RX ADMIN — Medication 12.5 MILLIGRAM(S): at 20:01

## 2022-07-07 RX ADMIN — Medication 125 MILLIGRAM(S): at 07:49

## 2022-07-07 RX ADMIN — INSULIN GLARGINE 10 UNIT(S): 100 INJECTION, SOLUTION SUBCUTANEOUS at 21:03

## 2022-07-07 RX ADMIN — Medication 2 UNIT(S): at 11:55

## 2022-07-07 RX ADMIN — TACROLIMUS 2.5 MILLIGRAM(S): 5 CAPSULE ORAL at 07:49

## 2022-07-07 RX ADMIN — Medication 2 UNIT(S): at 07:49

## 2022-07-07 RX ADMIN — ROSUVASTATIN CALCIUM 5 MILLIGRAM(S): 5 TABLET ORAL at 20:01

## 2022-07-07 RX ADMIN — Medication 2 UNIT(S): at 17:33

## 2022-07-07 RX ADMIN — Medication 10 MILLIGRAM(S): at 13:04

## 2022-07-07 RX ADMIN — MYCOPHENOLATE MOFETIL 1000 MILLIGRAM(S): 250 CAPSULE ORAL at 20:01

## 2022-07-07 RX ADMIN — Medication 12.5 MILLIGRAM(S): at 07:50

## 2022-07-07 RX ADMIN — PANTOPRAZOLE SODIUM 40 MILLIGRAM(S): 20 TABLET, DELAYED RELEASE ORAL at 06:03

## 2022-07-07 RX ADMIN — VALGANCICLOVIR 450 MILLIGRAM(S): 450 TABLET, FILM COATED ORAL at 20:01

## 2022-07-07 RX ADMIN — VALGANCICLOVIR 450 MILLIGRAM(S): 450 TABLET, FILM COATED ORAL at 07:50

## 2022-07-07 RX ADMIN — SODIUM ZIRCONIUM CYCLOSILICATE 10 GRAM(S): 10 POWDER, FOR SUSPENSION ORAL at 11:54

## 2022-07-07 RX ADMIN — Medication 2: at 11:54

## 2022-07-07 RX ADMIN — MAGNESIUM OXIDE 400 MG ORAL TABLET 400 MILLIGRAM(S): 241.3 TABLET ORAL at 07:50

## 2022-07-07 RX ADMIN — MYCOPHENOLATE MOFETIL 1000 MILLIGRAM(S): 250 CAPSULE ORAL at 07:50

## 2022-07-07 RX ADMIN — Medication 100 MILLIGRAM(S): at 07:49

## 2022-07-07 RX ADMIN — SODIUM ZIRCONIUM CYCLOSILICATE 10 GRAM(S): 10 POWDER, FOR SUSPENSION ORAL at 17:33

## 2022-07-07 RX ADMIN — CEFEPIME 100 MILLIGRAM(S): 1 INJECTION, POWDER, FOR SOLUTION INTRAMUSCULAR; INTRAVENOUS at 05:07

## 2022-07-07 RX ADMIN — HEPARIN SODIUM 5000 UNIT(S): 5000 INJECTION INTRAVENOUS; SUBCUTANEOUS at 06:00

## 2022-07-07 RX ADMIN — CHLORHEXIDINE GLUCONATE 1 APPLICATION(S): 213 SOLUTION TOPICAL at 06:10

## 2022-07-07 RX ADMIN — Medication 10 MILLIGRAM(S): at 21:02

## 2022-07-07 RX ADMIN — CEFEPIME 100 MILLIGRAM(S): 1 INJECTION, POWDER, FOR SOLUTION INTRAMUSCULAR; INTRAVENOUS at 20:02

## 2022-07-07 NOTE — DISCHARGE NOTE PROVIDER - CARE PROVIDER_API CALL
Vivian Nuñez (MD; PhD)  Adv [facility] Cardio; Cardiovascular Disease; Internal Medicine  [address] [place] [address]  Phone: [phone]  Fax: [phone]  Follow Up Time:     Catia Lombardi; PhD)  Thoracic and Cardiac Surgery  [address] [place] [address]  Phone: [phone]  Fax: [phone]  Follow Up Time:

## 2022-07-07 NOTE — PROGRESS NOTE ADULT - SUBJECTIVE AND OBJECTIVE BOX
Vascular Cardiology Consult Note    DIRECT SERVICE NUMBER:  [phone]           [email or web address]   OFFICE [phone]    CC:        Interval Events:   - US of the duplex of subclavian vein and radial artery obtained        Allergies    No Known Allergies    Intolerances    	    MEDICATIONS:  apixaban 2.5 milliGRAM(s) Oral every 12 hours  NIFEdipine XL 60 milliGRAM(s) Oral daily  sildenafil (REVATIO) 10 milliGRAM(s) Oral three times a day    atovaquone  Suspension 1500 milliGRAM(s) Oral daily  cefepime   IVPB 1000 milliGRAM(s) IV Intermittent every 8 hours  nystatin    Suspension 150094 Unit(s) Oral <User Schedule>  valGANciclovir 450 milliGRAM(s) Oral every 12 hours  vancomycin    Solution 125 milliGRAM(s) Oral every 12 hours        pantoprazole    Tablet 40 milliGRAM(s) Oral before breakfast  polyethylene glycol 3350 17 Gram(s) Oral daily    insulin glargine Injectable (LANTUS) 10 Unit(s) SubCutaneous at bedtime  insulin lispro (ADMELOG) corrective regimen sliding scale   SubCutaneous three times a day before meals  insulin lispro (ADMELOG) corrective regimen sliding scale   SubCutaneous at bedtime  insulin lispro Injectable (ADMELOG) 2 Unit(s) SubCutaneous three times a day before meals  predniSONE   Tablet 12.5 milliGRAM(s) Oral every 12 hours  rosuvastatin 5 milliGRAM(s) Oral every 24 hours    chlorhexidine 2% Cloths 1 Application(s) Topical <User Schedule>  magnesium oxide 400 milliGRAM(s) Oral three times a day with meals  multivitamin 1 Tablet(s) Oral daily  mycophenolate mofetil 1000 milliGRAM(s) Oral every 12 hours  tacrolimus 2.5 milliGRAM(s) Oral <User Schedule>      PAST MEDICAL & SURGICAL HISTORY:  AV block      Essential hypertension      Chronic HFrEF (heart failure with reduced ejection fraction)      Chronic kidney disease, unspecified CKD stage      CAD (coronary artery disease)      HLD (hyperlipidemia)      Type 2 diabetes mellitus      Artificial cardiac pacemaker          FAMILY HISTORY:  Family history of acute myocardial infarction (Father)  72        SOCIAL HISTORY:  unchanged    REVIEW OF SYSTEMS:  CONSTITUTIONAL: No fever, weight loss, or fatigue  EYES: No eye pain, visual disturbances, or discharge  ENT:  No difficulty hearing, tinnitus, vertigo; No sinus or throat pain  NECK: No pain or stiffness  RESPIRATORY:  No sob or dacosta   CARDIOVASCULAR:  No chest pain or palpitations   GASTROINTESTINAL: No abdominal or epigastric pain. No nausea, vomiting, or hematemesis; No diarrhea or constipation. No melena or hematochezia.  GENITOURINARY: No dysuria, frequency, hematuria, or incontinence  NEUROLOGICAL: No headaches, memory loss, loss of strength, numbness, or tremors  LYMPH Nodes: No enlarged glands  ENDOCRINE: No heat or cold intolerance; No hair loss  MUSCULOSKELETAL: No joint pain or swelling; No muscle, back, or extremity pain  PSYCHIATRIC: No depression, anxiety, mood swings, or difficulty sleeping  HEME/LYMPH: No easy bruising, or bleeding gums  ALLERGY AND IMMUNOLOGIC: No hives or eczema	    [ x] All others negative	  [ ] Unable to obtain      PHYSICAL EXAM:  T(C): 36.9 (07-07-22 @ 14:42), Max: 36.9 (07-07-22 @ 14:42)  HR: 93 (07-07-22 @ 14:42) (84 - 94)  BP: 113/67 (07-07-22 @ 14:42) (108/59 - 138/72)  RR: 18 (07-07-22 @ 14:42) (18 - 18)  SpO2: 98% (07-07-22 @ 14:42) (96% - 98%)  Wt(kg): --  I&O's Summary    06 Jul 2022 07:01  -  07 Jul 2022 07:00  --------------------------------------------------------  IN: 990 mL / OUT: 1700 mL / NET: -710 mL    07 Jul 2022 07:01  -  07 Jul 2022 17:58  --------------------------------------------------------  IN: 100 mL / OUT: 600 mL / NET: -500 mL      Appearance:  	  HEENT:   Normal oral mucosa, PERRL, EOMI	  Carotid:   Right: No bruit   Left:  No bruit   Lymphatic: No lymphadenopathy  Cardiovascular: Normal s1 and s2 w/o murmur, rubs or gallops   Respiratory: CTA bilaterally w/o wheezing, rales or rhonchi   Psychiatry:  AAO x 3  Gastrointestinal:  Soft, Non-tender, + BS	  Skin: No rashes, No ecchymoses, No cyanosis	  Neurologic: No focal deficits, Numbness and loss of sensation mildly on L hand   Extremities:      Vascular Pulse Exam:  Right DP: [x]palpable []non-palpable []audible      Left DP :   [x]palpable []non-palpable []audible  Right PT: [x]palpable [] non-palpable []audible   Left PT:  [x] palpable [] non-palpable []audible     Right Radial: [x]palpable []non-palpable []audible      Left Radial:   [x]palpable []non-palpable []audible  Right Ulnar: [x]palpable [] non-palpable []audible   Left Ulnar:  [x] palpable [] non-palpable []audible     R radial pulse significant stronger the L radial pulse     LABS:	 	    CBC Full  -  ( 07 Jul 2022 07:34 )  WBC Count : 14.54 K/uL  Hemoglobin : 13.4 g/dL  Hematocrit : 39.7 %  Platelet Count - Automated : 224 K/uL  Mean Cell Volume : 88.6 fl  Mean Cell Hemoglobin : 29.9 pg  Mean Cell Hemoglobin Concentration : 33.8 gm/dL  Auto Neutrophil # : x  Auto Lymphocyte # : x  Auto Monocyte # : x  Auto Eosinophil # : x  Auto Basophil # : x  Auto Neutrophil % : x  Auto Lymphocyte % : x  Auto Monocyte % : x  Auto Eosinophil % : x  Auto Basophil % : x    07-07    132<L>  |  100  |  46<H>  ----------------------------<  131<H>  5.5<H>   |  20<L>  |  1.32<H>  07-07    132<L>  |  99  |  49<H>  ----------------------------<  202<H>  5.6<H>   |  18<L>  |  1.13    Ca    9.2      07 Jul 2022 15:57  Ca    9.1      07 Jul 2022 12:07  Phos  2.5     07-07  Phos  3.1     07-06  Mg     2.0     07-07  Mg     2.1     07-06    TPro  6.7  /  Alb  4.2  /  TBili  0.8  /  DBili  x   /  AST  17  /  ALT  38  /  AlkPhos  74  07-07  TPro  6.9  /  Alb  4.1  /  TBili  1.3<H>  /  DBili  x   /  AST  19  /  ALT  36  /  AlkPhos  67  07-06

## 2022-07-07 NOTE — DISCHARGE NOTE NURSING/CASE MANAGEMENT/SOCIAL WORK - PATIENT PORTAL LINK FT
You can access the FollowMyHealth Patient Portal offered by [facility] by registering at the following website: [email or web address]. By joining [employer]’s FollowMyHealth portal, you will also be able to view your health information using other applications (apps) compatible with our system.

## 2022-07-07 NOTE — PROGRESS NOTE ADULT - PROBLEM SELECTOR PLAN 1
- s/p OHT with Dr. Earl/Maria C on 6/21 (ischemic time 261 min)  - IABP removed on POD 1  - off diuretics at this time, appears euvolemic on exam  - continue sildenafil 10 mg PO TID   - continue procardia 60 mg QD  - CAV ppx: plan to start ASA 81 mg PO today. Continue Rosuvastatin 5 mg qHS  - Will require labs to be completed as outpatient on Monday 7/11, will return for RHC/EMBx on 7/13

## 2022-07-07 NOTE — PROGRESS NOTE ADULT - SUBJECTIVE AND OBJECTIVE BOX
Subjective: Patient seen and examined resting in chair. ON no issues. Eager to go home and has no complaints.     Medications:  atovaquone  Suspension 1500 milliGRAM(s) Oral daily  cefepime   IVPB 1000 milliGRAM(s) IV Intermittent every 8 hours  chlorhexidine 2% Cloths 1 Application(s) Topical <User Schedule>  heparin   Injectable 5000 Unit(s) SubCutaneous every 8 hours  insulin glargine Injectable (LANTUS) 10 Unit(s) SubCutaneous at bedtime  insulin lispro (ADMELOG) corrective regimen sliding scale   SubCutaneous three times a day before meals  insulin lispro (ADMELOG) corrective regimen sliding scale   SubCutaneous at bedtime  insulin lispro Injectable (ADMELOG) 2 Unit(s) SubCutaneous three times a day before meals  magnesium oxide 400 milliGRAM(s) Oral three times a day with meals  multivitamin 1 Tablet(s) Oral daily  mycophenolate mofetil 1000 milliGRAM(s) Oral every 12 hours  NIFEdipine XL 60 milliGRAM(s) Oral daily  nystatin    Suspension 001674 Unit(s) Oral <User Schedule>  pantoprazole    Tablet 40 milliGRAM(s) Oral before breakfast  polyethylene glycol 3350 17 Gram(s) Oral daily  predniSONE   Tablet 12.5 milliGRAM(s) Oral every 12 hours  rosuvastatin 5 milliGRAM(s) Oral every 24 hours  sildenafil (REVATIO) 10 milliGRAM(s) Oral three times a day  sodium zirconium cyclosilicate 10 Gram(s) Oral every 48 hours  tacrolimus 2.5 milliGRAM(s) Oral <User Schedule>  valGANciclovir 450 milliGRAM(s) Oral every 12 hours  vancomycin    Solution 125 milliGRAM(s) Oral every 12 hours        Vitals:  Vital Signs Last 24 Hours  T(C): 36.5 (22 @ 11:12), Max: 36.7 (22 @ 14:49)  HR: 92 (22 @ 11:12) (84 - 95)  BP: 108/59 (22 @ 11:12) (106/74 - 138/72)  RR: 18 (22 @ 11:12) (18 - 18)  SpO2: 97% (22 @ 11:12) (96% - 97%)    Weight in k.9 (07-07 @ 09:10)    I&O's Summary    2022 07:01  -  2022 07:00  --------------------------------------------------------  IN: 990 mL / OUT: 1700 mL / NET: -710 mL    2022 07:01  -  2022 11:54  --------------------------------------------------------  IN: 100 mL / OUT: 200 mL / NET: -100 mL        Physical Exam  General: No distress. Comfortable.  Neck: JVP not elevated.   Chest: Clear to auscultation bilaterally  CV: Normal S1 and S2. No murmurs, rub, or gallops. Radial pulses normal.  Abdomen: Soft, non-distended, non-tender  Neurology: Alert and oriented times three.     Labs:                        13.4   14.54 )-----------( 224      ( 2022 07:34 )             39.7     07-07    129<L>  |  96  |  55<H>  ----------------------------<  131<H>  5.1   |  21<L>  |  1.18    Ca    9.7      2022 07:34  Phos  2.5     07-07  Mg     2.0     07-07    TPro  6.9  /  Alb  4.1  /  TBili  1.3<H>  /  DBili  x   /  AST  19  /  ALT  36  /  AlkPhos  67  07-06

## 2022-07-07 NOTE — PROGRESS NOTE ADULT - PROBLEM SELECTOR PLAN 1
Pt. with hemodynamically mediated JAGRUTI after OHT, in the setting of renal venous congestion and medication induced (tacro, Vanco) Upon review of labs, Scr was 1.2 on 4/19/22 and was elevated at 1.8 on 5/24/22. SCr downtrended to 1.01 on 6/20/22. Pt underwent OHT on 6/21/22 and required IABP post operatively.  UA done on 6/8/22 showed trace blood and proteinuria. Scr uptrended to 1.23 on 6/23/22 with elevated PA pressures. SCr increased to 2.32. Pt also noted to have increased in tacro dose and vancomycin levels were on higher side. Vanco and diuretics were held on 6/27/22. SCr trended to 1.1 today. Now on lasix 40 mg Daily. Repeat UA unremarkable.    Pt is nonoliguric with 1.7L as documented. Continue diuretics and titrate to keep CVP < 10. Labs reviewed. No urgent indication for RRT. Monitor labs and urine output.    Pt. will need follow up with nephrology as outpatient. Can follow up with Dr. Jaramillo in 4 weeks after discharge from the [facility].    If you have any questions, please feel free to contact me  John Fofana  Nephrology Fellow  [phone]; Prefer [employer] TEAMS  (After 5pm or on weekends please page the on-call fellow)

## 2022-07-07 NOTE — DISCHARGE NOTE PROVIDER - NSDCPNSUBOBJ_GEN_ALL_CORE
VITAL SIGNS    Telemetry:  nsr 80  Vital Signs Last 24 Hrs  T(C): 36.5 (22 @ 11:12), Max: 36.7 (22 @ 14:49)  T(F): 97.7 (22 @ 11:12), Max: 98.1 (22 @ 03:10)  HR: 92 (22 @ 11:12) (84 - 95)  BP: 108/59 (22 @ 11:12) (106/74 - 138/72)  RR: 18 (22 @ 11:12) (18 - 18)  SpO2: 97% (22 @ 11:12) (96% - 97%)                    @ 07:01  -   @ 07:00  --------------------------------------------------------  IN: 990 mL / OUT: 1700 mL / NET: -710 mL     @ 07:01  -   @ 12:45  --------------------------------------------------------  IN: 100 mL / OUT: 200 mL / NET: -100 mL          Daily     Daily Weight in k.9 (2022 09:10)            CAPILLARY BLOOD GLUCOSE      POCT Blood Glucose.: 204 mg/dL (2022 11:13)  POCT Blood Glucose.: 126 mg/dL (2022 07:15)  POCT Blood Glucose.: 150 mg/dL (2022 23:03)  POCT Blood Glucose.: 149 mg/dL (2022 17:52)                    Coumadin    [ ] YES          [x  ]      NO                                   PHYSICAL EXAM        Neurology: alert and oriented x 3, nonfocal, no gross deficits  CV : s1 s2 RRR  Sternal Wound :  CDI , Stable  Lungs: cta  Abdomen: soft, nontender, nondistended, positive bowel sounds, last bowel movement  +                   :    voiding     Extremities:      -edema   /  -   calve tenderness ,  L groin  stitch, site cdi          atovaquone  Suspension 1500 milliGRAM(s) Oral daily  cefepime   IVPB 1000 milliGRAM(s) IV Intermittent every 8 hours  chlorhexidine 2% Cloths 1 Application(s) Topical <User Schedule>  heparin   Injectable 5000 Unit(s) SubCutaneous every 8 hours  insulin glargine Injectable (LANTUS) 10 Unit(s) SubCutaneous at bedtime  insulin lispro (ADMELOG) corrective regimen sliding scale   SubCutaneous three times a day before meals  insulin lispro (ADMELOG) corrective regimen sliding scale   SubCutaneous at bedtime  insulin lispro Injectable (ADMELOG) 2 Unit(s) SubCutaneous three times a day before meals  magnesium oxide 400 milliGRAM(s) Oral three times a day with meals  multivitamin 1 Tablet(s) Oral daily  mycophenolate mofetil 1000 milliGRAM(s) Oral every 12 hours  NIFEdipine XL 60 milliGRAM(s) Oral daily  nystatin    Suspension 915187 Unit(s) Oral <User Schedule>  pantoprazole    Tablet 40 milliGRAM(s) Oral before breakfast  polyethylene glycol 3350 17 Gram(s) Oral daily  predniSONE   Tablet 12.5 milliGRAM(s) Oral every 12 hours  rosuvastatin 5 milliGRAM(s) Oral every 24 hours  sildenafil (REVATIO) 10 milliGRAM(s) Oral three times a day  sodium zirconium cyclosilicate 10 Gram(s) Oral every 48 hours  tacrolimus 2.5 milliGRAM(s) Oral <User Schedule>  valGANciclovir 450 milliGRAM(s) Oral every 12 hours  vancomycin    Solution 125 milliGRAM(s) Oral every 12 hours                    Discussed with Cardiothoracic Team at AM rounds.

## 2022-07-07 NOTE — PROGRESS NOTE ADULT - SUBJECTIVE AND OBJECTIVE BOX
[facility] DIVISION OF KIDNEY DISEASES AND HYPERTENSION -- FOLLOW UP NOTE  --------------------------------------------------------------------------------  66M male with PMHx HTN, HLD, type 2 DM, chronic HFrEF,  (EF 25-30% by Echo) underwent OHT on 6/21/22. Nephrology following for JAGRUTI.     Upon review of labs, Scr was 1.2 on 4/19/22 and was elevated at 1.8 on 5/24/22. SCr downtrended to 1.01 on 6/20/22. Pt underwent OHT on 6/21/22 and required IABP post operatively. Scr uptrended to 1.23 on 6/23/22 with elevated PA pressures. SCr increased to 1.97. Pt currently on diuretics and non oliguric. UA done on 6/8/22 showed trace blood and proteinuria.  SCr stable/elevated at 1.4 today.    Pt. seen and examined today sitting comfortably in chair. Good UOP. SCr. 1.1 For discharge today. No complaints.       PAST HISTORY  --------------------------------------------------------------------------------  No significant changes to PMH, PSH, FHx, SHx, unless otherwise noted    ALLERGIES & MEDICATIONS  --------------------------------------------------------------------------------  Allergies    No Known Allergies    Intolerances      Standing Inpatient Medications  atovaquone  Suspension 1500 milliGRAM(s) Oral daily  cefepime   IVPB 1000 milliGRAM(s) IV Intermittent every 8 hours  chlorhexidine 2% Cloths 1 Application(s) Topical <User Schedule>  heparin   Injectable 5000 Unit(s) SubCutaneous every 8 hours  insulin glargine Injectable (LANTUS) 10 Unit(s) SubCutaneous at bedtime  insulin lispro (ADMELOG) corrective regimen sliding scale   SubCutaneous three times a day before meals  insulin lispro (ADMELOG) corrective regimen sliding scale   SubCutaneous at bedtime  insulin lispro Injectable (ADMELOG) 2 Unit(s) SubCutaneous three times a day before meals  magnesium oxide 400 milliGRAM(s) Oral three times a day with meals  multivitamin 1 Tablet(s) Oral daily  mycophenolate mofetil 1000 milliGRAM(s) Oral every 12 hours  NIFEdipine XL 60 milliGRAM(s) Oral daily  nystatin    Suspension 747812 Unit(s) Oral <User Schedule>  pantoprazole    Tablet 40 milliGRAM(s) Oral before breakfast  polyethylene glycol 3350 17 Gram(s) Oral daily  predniSONE   Tablet 12.5 milliGRAM(s) Oral every 12 hours  rosuvastatin 5 milliGRAM(s) Oral every 24 hours  sildenafil (REVATIO) 10 milliGRAM(s) Oral three times a day  sodium zirconium cyclosilicate 10 Gram(s) Oral every 48 hours  tacrolimus 2.5 milliGRAM(s) Oral <User Schedule>  valGANciclovir 450 milliGRAM(s) Oral every 12 hours  vancomycin    Solution 125 milliGRAM(s) Oral every 12 hours    PRN Inpatient Medications      REVIEW OF SYSTEMS  --------------------------------------------------------------------------------  Gen: No fevers/chills  Skin: No rashes  Head/Eyes/Ears: Normal hearing,   Respiratory: No dyspnea, cough  CV: No chest pain  GI: No abdominal pain, diarrhea  : No dysuria, hematuria  MSK: No  edema  Heme: No easy bruising or bleeding  Psych: No significant depression      All other systems were reviewed and are negative, except as noted.    VITALS/PHYSICAL EXAM  --------------------------------------------------------------------------------  T(C): 36.5 (07-07-22 @ 11:12), Max: 36.7 (07-06-22 @ 23:12)  HR: 92 (07-07-22 @ 11:12) (84 - 94)  BP: 108/59 (07-07-22 @ 11:12) (108/59 - 138/72)  RR: 18 (07-07-22 @ 11:12) (18 - 18)  SpO2: 97% (07-07-22 @ 11:12) (96% - 97%)  Wt(kg): --        07-06-22 @ 07:01  -  07-07-22 @ 07:00  --------------------------------------------------------  IN: 990 mL / OUT: 1700 mL / NET: -710 mL    07-07-22 @ 07:01  -  07-07-22 @ 14:52  --------------------------------------------------------  IN: 100 mL / OUT: 200 mL / NET: -100 mL      Physical Exam:  	Gen: NAD  	HEENT: Anicteric  	Pulm: faint posterior crackles  	CV: S1S2+, midline sx scar, dressing +  	Abd: Soft, +BS    	Ext: LE edema B/L (trace)  	Neuro: Awake  	Skin: Warm and dry        LABS/STUDIES  --------------------------------------------------------------------------------              13.4   14.54 >-----------<  224      [07-07-22 @ 07:34]              39.7     132  |  99  |  49  ----------------------------<  202      [07-07-22 @ 12:07]  5.6   |  18  |  1.13        Ca     9.1     [07-07-22 @ 12:07]      Mg     2.0     [07-07-22 @ 07:34]      Phos  2.5     [07-07-22 @ 07:34]    TPro  6.9  /  Alb  4.1  /  TBili  1.3  /  DBili  x   /  AST  19  /  ALT  36  /  AlkPhos  67  [07-06-22 @ 07:38]      Creatinine Trend:  SCr 1.13 [07-07 @ 12:07]  SCr 1.18 [07-07 @ 07:34]  SCr 1.54 [07-06 @ 07:38]  SCr 1.51 [07-05 @ 07:31]  SCr 1.37 [07-04 @ 07:33]    Urinalysis - [07-03-22 @ 12:05]      Color Light Yellow / Appearance Clear / SG 1.012 / pH 6.5      Gluc Trace / Ketone Negative  / Bili Negative / Urobili Negative       Blood Negative / Protein Trace / Leuk Est Negative / Nitrite Negative      RBC 1 / WBC 0 / Hyaline 1 / Gran  / Sq Epi  / Non Sq Epi 0 / Bacteria Negative      Iron 26, TIBC 295, %sat 9      [05-27-22 @ 17:02]  Ferritin 217      [05-27-22 @ 17:02]  TSH 0.57      [06-22-22 @ 21:13]  Lipid: chol 159, , HDL 29, LDL --      [05-20-22 @ 14:17]

## 2022-07-07 NOTE — PROGRESS NOTE ADULT - PROBLEM SELECTOR PLAN 5
- Vascular Cardiology following  -  partially occlusive thrombus of L subclavian vein and superficial RUE DVT on VA duplex 6/28  - Va duplex 7/6 show no DVT, partially occlusive thrombus visualized in the medial segment of the left subclavian vein without change along with superficial thrombophlebitis right cephalic and basilic veins at the right forearm  - per Vascular plan to start Eliquis 2.5 mg PO BID upon discharge

## 2022-07-07 NOTE — PROGRESS NOTE ADULT - NUTRITIONAL ASSESSMENT
Diet, Regular:   Consistent Carbohydrate {No Snacks} (CSTCHO)  No Concentrated Potassium  Supplement Feeding Modality:  Oral  Glucerna Shake Cans or Servings Per Day:  1       Frequency:  Two Times a day (07-02-22 @ 10:17) [Active]

## 2022-07-07 NOTE — DISCHARGE NOTE PROVIDER - NSDCMRMEDTOKEN_GEN_ALL_CORE_FT
allopurinol: 75 mg orally once a day (hospital)  allopurinol 100 mg oral tablet: 1 tab(s) orally once a day (home)  amiodarone 200 mg oral tablet: 2 tab(s) orally every 12 hours until 5/31, then 1 tab orally twice a day (hospital)  Aspirin Enteric Coated 81 mg oral delayed release tablet: 1 tab(s) orally once a day (home/hospital)  carvedilol 12.5 mg oral tablet: 2 tab(s) orally every 12 hours (home)    Note: Changed to metoprolol at [facility]  clopidogrel 75 mg oral tablet: 1 tab(s) orally once a day (home/hospital)  Entresto 24 mg-26 mg oral tablet: 1 tab(s) orally 2 times a day (home)    Note: Stopped at [facility]  glimepiride 1 mg oral tablet: 1 tab(s) orally once a day (home)  Lasix 40 mg oral tablet: 1 tab(s) orally once a day (home/hospital)  Lipitor 80 mg oral tablet: 1 tab(s) orally once a day (at bedtime) (hospital)  metoprolol succinate 25 mg oral tablet, extended release: 1 tab(s) orally every 12 hours (hospital)  pantoprazole 40 mg oral delayed release tablet: 1 tab(s) orally once a day (before a meal) (hospital)  spironolactone 25 mg oral tablet: 1 tab(s) orally once a day (hospital)  Vitamin D2 1.25 mg (50,000 intl units) oral capsule: 1 cap(s) orally once a week (home)   apixaban 2.5 mg oral tablet: 1 tab(s) orally every 12 hours  9am  9pm  aspirin 81 mg oral delayed release tablet: 1 tab(s) orally once a day  atovaquone 750 mg/5 mL oral suspension: 10 milliliter(s) orally once a day  cefpodoxime 200 mg oral tablet: 1 tab(s) orally every 12 hours  9am  9pm  until 7/15  Crestor 5 mg oral tablet: 1 tab(s) orally once a day  9 am  doxycycline hyclate 100 mg oral capsule: 1 cap(s) orally 2 times a day  9 am  9 pm  Levemir FlexPen 100 units/mL subcutaneous solution: 8 unit(s) subcutaneous once a day (at bedtime)  magnesium oxide 400 mg oral tablet: 1 tab(s) orally 3 times a day (with meals)  9 am  2 pm  9 pm  metFORMIN 500 mg oral tablet: 1 tab(s) orally 2 times a day  09 am  9 pm  Multiple Vitamins oral tablet: 1 tab(s) orally once a day  mycophenolate mofetil 250 mg oral capsule: 4 cap(s) orally 2 times a day  9 am  9 pm  NIFEdipine 60 mg oral tablet, extended release: 1 tab(s) orally once a day  nystatin 800446 units/ 5 ml oral suspension: 09am  2 pm  5 pm  9pm  pantoprazole 40 mg oral delayed release tablet: 1 tab(s) orally once a day (before a meal)  predniSONE 10 mg oral tablet: 1 tab(s) orally 2 times a day   09:00 am  9:00   pm  predniSONE 5 mg oral tablet: 0.5 tab(s) orally 2 times a day ,  0900 am  9:00  pm  sildenafil 20 mg oral tablet: 0.5 tab(s) orally 3 times a day  0900 am  2 pm  9 pm  sodium zirconium cyclosilicate: 10 gram(s) orally once a day  5 pm  tacrolimus 0.5 mg oral capsule: 1 cap(s) orally every 12 hours  9 am  9 pm  tacrolimus 1 mg oral capsule: 2 cap(s) orally 2 times a day  9am  9 pm  valGANciclovir 450 mg oral tablet: 1 tab(s) orally every 12 hours  9am  9pm

## 2022-07-07 NOTE — PROGRESS NOTE ADULT - SUBJECTIVE AND OBJECTIVE BOX
Heart Transplant Education    Learner: patient and wife Teresa     Barriers to learning: None    Topics reviewed: Pt has been receiving ongoing transplant education since hospitalization. Pt is s/p OHT 6/21/22, he currently denies any pain or complaints at this time. With his wife present, we discussed the following topics: immunosuppression medications, signs and symptoms of infection and rejection, lifestyle changes post - heart transplant, and checking vital signs and monitoring blood sugars. We discussed the clinic/biopsy appointments and follow up procedures. Reviewed nutrition and importance of following cardiac/diabetic diet.  Pt will meet with the clinical pharmacist to fill pillbox at time of discharge. Teach back confirmed, both the patient and family member have good understanding of topics discussed. Pt has the transplant phone #, and is aware that the team will continue to be available. Plan for possible discharge home tomorrow. F/u labs on monday and RTC/bx on 7/13/22.    Methods used: Verbal discussion, Patient Guide, discharge paperwork    Evaluation: The patient and family completeed post-transplant care teaching. Encouraged to read and review the Patient Guide and discharge paperwork left at bedside.     Time spent with patient: 45 minutes    Maricruz Noonan NP  Heart Transplant Coordinator  [phone]

## 2022-07-07 NOTE — PROGRESS NOTE ADULT - ASSESSMENT
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 5/20 with near syncope in setting of worsening HF symptoms and found to have 41 episodes of VT, many terminating with ATP. LHC did not reveal new obstructive CAD and he underwent EPS which did not reveal endocardial substrate amenable for ablation. RHC revealed severely depressed cardiac output and he was transferred to [facility] 5/26 for advanced therapies evaluation. Pt was evaluated bu transplant ID for potential OHT. He became febrile, last 6/7 T 38. He has been afebrile since and blood cultures from 6/7 with NGTD x 48 hours and his leukocytosis has not worsened. A suitable donor was identified and patient underwent OHT with Dr. Earl/Maria C on 6/21 (ischemic time 261 mins, CMV +/+, Toxo -/-). On POD 1 IABP was removed. Extubated afternoon 6/22. Cultures from his ICD site in the OR are returning positive for staph epidermidis/ morganella morganii, remains on IV abx.     WORKUP  Surgical Swab of AICD pocket (6/21): MRSE and Morganella morganii  VA Duplex Upper Ext Vein Scan, Bilat (06.28): There is acute, partially occlusive thrombus affecting the left subclavian vein. The right basilic and cephalic veins, superficial veins, are again  demonstrated to be thrombosed.   Xray Chest (06.27): Bibasilar atelectasis. No significant interval change.  [facility]: [facility] (06-26    DIAGNOSIS and IMPRESSION  # AICD Positive Culture Finding (MRSE and Morganella)  # OHT 6/21 (CMV +/+, Toxo -/-)  # JAGRUTI/CKD   # LUE non-occlusive DVT and RUE superficial Thrombus  Afebrile, uptrending leukocytosis to 15k  Only in liquid media so could be contaminant but reasonable to cover given murky fluid noted around pocket at time of explant  Vanc T 20 on 6/26 and Vanc dose was held but became subtherapeutic on 6/28 and 6/29 -> Restarted on 6/28 at 500mg QD  Susceptibilities of Staph epi and Morganella suggest that in the future could simplify to Ceftriaxone plus Vancomycin    RECOMMENDATIONS  Continue Cefepime 1gm Q8 and Vancomycin -> Can switch to cefpodoxime and Doxy at time of discharge  Vanc level still low and Dose increased to Vanc 500mg Q12  Will aim for a total of 2-3 weeks of abx therapy from AICD explant date.   CMV intermediate risk: c/w valcyte  toxo low risk: no PPx  PJP: c/w Mepron  Thrush: Nystatin  On PO Vancomycin as per CTU ppx protocol    Reggie Escalante MD  Can be called via Teams  After 5pm/weekends [phone]    is a 66 year old gentleman with PMH off ACC/AHA Stage D mixed NICM/ICM (likely familial with strong FH and early arrhythmia history in his 30's) with LVED 5.2 cm and LVEF 10-15% s/p PPM upgraded to CRT-D, CAD s/p PCI to mLAD 4/2022, well controlled DM2 (A1c 6.2%), and CKD III (Cr 1.4) who initially presented to [facility] 5/20 with near syncope in setting of worsening HF symptoms and found to have 41 episodes of VT, many terminating with ATP. LHC did not reveal new obstructive CAD and he underwent EPS which did not reveal endocardial substrate amenable for ablation. RHC revealed severely depressed cardiac output and he was transferred to [facility] 5/26 for advanced therapies evaluation. Pt was evaluated bu transplant ID for potential OHT. He became febrile, last 6/7 T 38. He has been afebrile since and blood cultures from 6/7 with NGTD x 48 hours and his leukocytosis has not worsened. A suitable donor was identified and patient underwent OHT with Dr. Earl/Maria C on 6/21 (ischemic time 261 mins, CMV +/+, Toxo -/-). On POD 1 IABP was removed. Extubated afternoon 6/22. Cultures from his ICD site in the OR are returning positive for staph epidermidis/ morganella morganii, remains on IV abx.     WORKUP  Surgical Swab of AICD pocket (6/21): MRSE and Morganella morganii  VA Duplex Upper Ext Vein Scan, Bilat (06.28): There is acute, partially occlusive thrombus affecting the left subclavian vein. The right basilic and cephalic veins, superficial veins, are again  demonstrated to be thrombosed.   Xray Chest (06.27): Bibasilar atelectasis. No significant interval change.  [facility] ([facility]    DIAGNOSIS and IMPRESSION  # AICD Positive Culture Finding (MRSE and Morganella)  # OHT 6/21 (CMV +/+, Toxo -/-)  # JAGRUTI/CKD   # LUE non-occlusive DVT and RUE superficial Thrombus  Afebrile, uptrending leukocytosis to 15k  Only in liquid media so could be contaminant but reasonable to cover given murky fluid noted around pocket at time of explant  Vanc T 20 on 6/26 and Vanc dose was held but became subtherapeutic on 6/28 and 6/29 -> Restarted on 6/28 at 500mg QD  Susceptibilities of Staph epi and Morganella suggest that in the future could simplify to Ceftriaxone plus Vancomycin    RECOMMENDATIONS  Continue Cefepime 1gm Q8 and Vancomycin -> Can switch to cefpodoxime and Doxy at time of discharge  Vanc level still low and Dose increased to Vanc 500mg Q12  Will aim for a total of 2-3 weeks of abx therapy from AICD explant date.   CMV intermediate risk: c/w valcyte  toxo low risk: no PPx  PJP: c/w Mepron  Thrush: Nystatin  On PO Vancomycin as per CTU ppx protocol  at time of discharge will plan on cefpodoxime and doxycycline    Reggie Escalante MD  Can be called via Teams  After 5pm/weekends [phone]

## 2022-07-07 NOTE — DISCHARGE NOTE PROVIDER - NSDCACTIVITY_GEN_ALL_CORE
[Time Spent: ___ minutes] : I have spent [unfilled] minutes of time on the encounter. Showering allowed/Stairs allowed/Walking - Indoors allowed/Walking - Outdoors allowed/Follow Instructions Provided by your Surgical Team

## 2022-07-07 NOTE — PROGRESS NOTE ADULT - NS ATTEND AMEND GEN_ALL_CORE FT
67 y/o M with h/o HFrEF, admitted with VT and cardiogenic shock, now s/p OHT 6/21/2022 (CMV +/+, Toxo -/-).   RHC/biopsy yesterday with low filling pressures, no rejection. PA pressures improved from last week since starting Sildenafil.   Stopped diuretics and encouraging PO intake.   Continue Tacrolimus (goal trough 12-14), CellCept 1000mg BID, Prednisone taper. For prophylaxis, continue Atovaquone, Nystatin, Valcyte. Continue statin and ASA on discharge. Will need Apixaban upon discharge for RUE DVT. Continue Lokelma for persistent hyperkalemia.

## 2022-07-07 NOTE — DISCHARGE NOTE PROVIDER - HOSPITAL COURSE
66M Mixed Ischemic/NICM Cardiomyopathy (EF 25-30% at baseline, s/p BIV-ICD), CAD (medically managed MIs in ,, Most recent stent in 2022 to mLAD) presented with multiple near syncope, found to have 41 episodes of VT and admitted initially to cardiac telemetry for further evaluation/management. Ischemic evaluation without new disease and VT ablation without substrate to complete ablation. Transferred from [facility] to [facility] for further management and evaluation for LVAD vs OHT. CT surgery consulted for LVAD/transplant evaluation.   Currently undergoing LVAD/transplant eval  Care per Heart failure and CCU primary team  Preop work up and diagnostics in progress  22 s/pOrthotopic heart transplant, ICD removal, iabp  Post op inotropes /pressors, nitric oxide  /Primacor/insulin  extubated d 1, hi flow   iabp d/c    nitric d/c   s/p biopsy, pw d/c,echo neg effusion  Htn-hydralazine, procardia added increased   JAGRUTI-stable, renal transplant following  hyponatremia, lasix/albumin  L sc thrombus,R cephalic thrombus on heparin sq---> plan to check LE duplex and if + may need full anticoagulation  Insulin infusion remains on  On vanco and cefepime for icd pocket + staph epi and morganella   primacor d/c   Transferred to sdu  As per renal lasix increased 40po bid  Chest tube # 3 d/c   VSS; insulin gttp weaned off overnight;  RSR 80-90; ?d/c med ct today- d/w Dr. Lombardi; LE sono neg DVT; tacro level today 10- continue tacro 4 po bid; vanco trough level 5.6- vanco 500 mg iv bid; change to po abx at home   Discharge planning- home ? fri; ck covid pcr; next biopsy scheduled  Call [facility] cardiology for anticoagulation recs. D/c hydralazine to maximize sildenefil. Hep sq.   D/c one  Ms ct today  Cont bid diuresis   Maintain  ct, if under 100cc can d/c   Hyperkalemia, diet restricted , no furrther intervention, discussed in rounds  Decrease lasix qd ./Ace wrap LE  7/3 VSS WBC up to 19,000 - Mg 1.7 - HF rounds made - plan: mg 2gm ivss x1, start mg ox 400mg po tid. bc x 2 & u/a for leukocytosis. d/c plan ? end of week -  RHC/biospy   VSS - will ck WBC this am - u/a negative - BC testing from yesterday d/t increase WBC- ck covid this am - NPO after midnight for biopsy in am- d/c planning   VSS wbc up to 20 - differential added to am CBC - s/w Mauro in lab - plan: RHC w/ biopsy this am.  CT chest/abd/pelvis per DR. Lombardi.  rpt b/l UE doppplers per Dr. Muse, [facility] cardiolgoy.  d/c plan home end of week ?Friday.  lasix d/c'd for now - will reassess after RHC results    WBC trending down.  CT C/A/P reviewed with Dr Lombardi, , no change in intervention.  < from: CT Chest No Cont (22 @ 17:10) >  Residual fibrous capsule in the regionof the previous cardiac device in   the left chest wall. No evidence of a discrete walled off collection on   this limited noncontrast study.    Interval heart transplant with small pericardial effusion/hemopericardium.    < end of copied text >    Likely transition to oral abx and d/c planning for tomorrow.  His wt is trending down, diuretics currently on hold.  rEPEAT UE /LE DOPPLERS PENDING   UE duplex arterial:  < from: VA Duplex Upper Extrem Arterial Limited, Left (22 @ 22:25) >      The peak systolic velocities of the left upper extremity arteries are as   follows:    Subclavian artery: 137 cm/s proximal; 88 cm/s mid; 85 cm/s distal  Axillaryartery: 94 cm/s  Brachial artery: 106 cm/s proximal; 93 cm/s mid; 80 cm/s distal  Radial artery: 20 cm/s proximal; occluded mid; occluded distal  Ulnar artery: 61 cm/s proximal; 78 cm/s mid; 87 cm/s distal    IMPRESSION: Occlusion of the mid to distal left radial artery.    < end of copied text >    < from: VA Duplex Upper Ext Vein Scan, Bilat (22 @ 22:26) >    Doppler examination shows normal spontaneous and phasic flow.    IMPRESSION:  No evidence of deep venous thrombosis in either upper extremity.    Chronic partially occlusive thrombus visualized in the medial segment of   the left subclavian vein without change.    Superficial thrombophlebitis right cephalic and basilic veins at the   right forearm is again noted.    < end of copied text >    D/w Dr Gracia ASA and  Apixaban  2.5 bid  Lokelma added for hyperkalemia  D/c Plannung     66M Mixed Ischemic/NICM Cardiomyopathy (EF 25-30% at baseline, s/p BIV-ICD), CAD (medically managed MIs in ,, Most recent stent in 2022 to mLAD) presented with multiple near syncope, found to have 41 episodes of VT and admitted initially to cardiac telemetry for further evaluation/management. Ischemic evaluation without new disease and VT ablation without substrate to complete ablation. Transferred from [facility] to [facility] for further management and evaluation for LVAD vs OHT. CT surgery consulted for LVAD/transplant evaluation.   Currently undergoing LVAD/transplant eval  Care per Heart failure and CCU primary team  Preop work up and diagnostics in progress  22 s/pOrthotopic heart transplant, ICD removal, iabp  Post op inotropes /pressors, nitric oxide  /Primacor/insulin  extubated d 1, hi flow   iabp d/c    nitric d/c   s/p biopsy, pw d/c,echo neg effusion  Htn-hydralazine, procardia added increased   JAGRUTI-stable, renal transplant following  hyponatremia, lasix/albumin  L sc thrombus,R cephalic thrombus on heparin sq---> plan to check LE duplex and if + may need full anticoagulation  Insulin infusion remains on  On vanco and cefepime for icd pocket + staph epi and morganella   primacor d/c   Transferred to sdu  As per renal lasix increased 40po bid  Chest tube # 3 d/c   VSS; insulin gttp weaned off overnight;  RSR 80-90; ?d/c med ct today- d/w Dr. Lombardi; LE sono neg DVT; tacro level today 10- continue tacro 4 po bid; vanco trough level 5.6- vanco 500 mg iv bid; change to po abx at home   Discharge planning- home ? fri; ck covid pcr; next biopsy scheduled  Call [facility] cardiology for anticoagulation recs. D/c hydralazine to maximize sildenefil. Hep sq.   D/c one  Ms ct today  Cont bid diuresis   Maintain  ct, if under 100cc can d/c   Hyperkalemia, diet restricted , no furrther intervention, discussed in rounds  Decrease lasix qd ./Ace wrap LE  7/3 VSS WBC up to 19,000 - Mg 1.7 - HF rounds made - plan: mg 2gm ivss x1, start mg ox 400mg po tid. bc x 2 & u/a for leukocytosis. d/c plan ? end of week -  RHC/biospy   VSS - will ck WBC this am - u/a negative - BC testing from yesterday d/t increase WBC- ck covid this am - NPO after midnight for biopsy in am- d/c planning   VSS wbc up to 20 - differential added to am CBC - s/w Mauro in lab - plan: RHC w/ biopsy this am.  CT chest/abd/pelvis per DR. Lombardi.  rpt b/l UE doppplers per Dr. Muse, [facility] cardiolgoy.  d/c plan home end of week ?Friday.  lasix d/c'd for now - will reassess after RHC results    WBC trending down.  CT C/A/P reviewed with Dr Lombardi, , no change in intervention.  < from: CT Chest No Cont (22 @ 17:10) >  Residual fibrous capsule in the regionof the previous cardiac device in   the left chest wall. No evidence of a discrete walled off collection on   this limited noncontrast study.    Interval heart transplant with small pericardial effusion/hemopericardium.    < end of copied text >    Likely transition to oral abx and d/c planning for tomorrow.  His wt is trending down, diuretics currently on hold.  rEPEAT UE /LE DOPPLERS PENDING   UE duplex arterial:  < from: VA Duplex Upper Extrem Arterial Limited, Left (22 @ 22:25) >      The peak systolic velocities of the left upper extremity arteries are as   follows:    Subclavian artery: 137 cm/s proximal; 88 cm/s mid; 85 cm/s distal  Axillaryartery: 94 cm/s  Brachial artery: 106 cm/s proximal; 93 cm/s mid; 80 cm/s distal  Radial artery: 20 cm/s proximal; occluded mid; occluded distal  Ulnar artery: 61 cm/s proximal; 78 cm/s mid; 87 cm/s distal    IMPRESSION: Occlusion of the mid to distal left radial artery.    < end of copied text >    < from: VA Duplex Upper Ext Vein Scan, Bilat (22 @ 22:26) >    Doppler examination shows normal spontaneous and phasic flow.    IMPRESSION:  No evidence of deep venous thrombosis in either upper extremity.    Chronic partially occlusive thrombus visualized in the medial segment of   the left subclavian vein without change.    Superficial thrombophlebitis right cephalic and basilic veins at the   right forearm is again noted.    < end of copied text >    D/w Dr Samia STAFFORD and  Apixaban  2.5 bid  Lokelma added for hyperkalemia  D/c Plannung   D/c planning for today

## 2022-07-07 NOTE — PROGRESS NOTE ADULT - PROBLEM SELECTOR PLAN 4
- postop course c/b leukocytosis, though overall improving   - ICD pocket culture positive for staph epidermidis/ morganella morganii  - remains on Cefepime and IV Vancomycin. To complete 2-3 week course post ICD explant. Plan to switch Cefpodoxime and Doxy later today   - CT C/A/P 7/5 showed residual fibrous capsule in the region of the previous cardiac device in the left chest wall. No evidence of a discrete walled off collection on  this limited noncontrast study

## 2022-07-07 NOTE — PROGRESS NOTE ADULT - SUBJECTIVE AND OBJECTIVE BOX
DIABETES FOLLOW UP : Seen earlier today    INTERVAL HX: feeling well, prednisone decreased to 12.5mg bid, tolerating po, received insulin pen and FS education per d/w RN and pt   plan for dc home today    Review of Systems:  General: As above  Cardiovascular: No chest pain  Respiratory: No SOB  GI: No nausea, vomiting  Endocrine: no  S&Sx of hypoglycemia    Allergies    No Known Allergies    Intolerances      MEDICATIONS  (STANDING):  atovaquone  Suspension 1500 milliGRAM(s) Oral daily  cefepime   IVPB 1000 milliGRAM(s) IV Intermittent every 8 hours  chlorhexidine 2% Cloths 1 Application(s) Topical <User Schedule>  heparin   Injectable 5000 Unit(s) SubCutaneous every 8 hours  insulin glargine Injectable (LANTUS) 10 Unit(s) SubCutaneous at bedtime  insulin lispro (ADMELOG) corrective regimen sliding scale   SubCutaneous three times a day before meals  insulin lispro (ADMELOG) corrective regimen sliding scale   SubCutaneous at bedtime  insulin lispro Injectable (ADMELOG) 2 Unit(s) SubCutaneous three times a day before meals  magnesium oxide 400 milliGRAM(s) Oral three times a day with meals  multivitamin 1 Tablet(s) Oral daily  mycophenolate mofetil 1000 milliGRAM(s) Oral every 12 hours  NIFEdipine XL 60 milliGRAM(s) Oral daily  nystatin    Suspension 232622 Unit(s) Oral <User Schedule>  pantoprazole    Tablet 40 milliGRAM(s) Oral before breakfast  polyethylene glycol 3350 17 Gram(s) Oral daily  predniSONE   Tablet 12.5 milliGRAM(s) Oral every 12 hours  rosuvastatin 5 milliGRAM(s) Oral every 24 hours  sildenafil (REVATIO) 10 milliGRAM(s) Oral three times a day  tacrolimus 2.5 milliGRAM(s) Oral <User Schedule>  valGANciclovir 450 milliGRAM(s) Oral every 12 hours  vancomycin    Solution 125 milliGRAM(s) Oral every 12 hours      PHYSICAL EXAM:  VITALS: T(C): 36.5 (07-07-22 @ 07:13)  T(F): 97.7 (07-07-22 @ 07:13), Max: 98.1 (07-07-22 @ 03:10)  HR: 94 (07-07-22 @ 07:13) (84 - 95)  BP: 138/72 (07-07-22 @ 07:13) (105/57 - 138/72)  RR:  (18 - 18)  SpO2:  (96% - 98%)  Wt(kg): --  GENERAL: male sitting in chair in NAD MSI TRISTEN  Respiratory: Respirations unlabored   Extremities: Warm, no edema  NEURO: Alert , appropriate     LABS:  POCT Blood Glucose.: 126 mg/dL (07-07-22 @ 07:15)  POCT Blood Glucose.: 150 mg/dL (07-06-22 @ 23:03)  POCT Blood Glucose.: 149 mg/dL (07-06-22 @ 17:52)  POCT Blood Glucose.: 213 mg/dL (07-06-22 @ 11:39)  POCT Blood Glucose.: 141 mg/dL (07-06-22 @ 06:57)  POCT Blood Glucose.: 104 mg/dL (07-05-22 @ 20:53)  POCT Blood Glucose.: 112 mg/dL (07-05-22 @ 17:45)  POCT Blood Glucose.: 172 mg/dL (07-05-22 @ 12:43)  POCT Blood Glucose.: 116 mg/dL (07-05-22 @ 07:26)  POCT Blood Glucose.: 155 mg/dL (07-04-22 @ 21:57)  POCT Blood Glucose.: 155 mg/dL (07-04-22 @ 20:45)  POCT Blood Glucose.: 130 mg/dL (07-04-22 @ 17:44)  POCT Blood Glucose.: 198 mg/dL (07-04-22 @ 12:08)                            13.4   14.54 )-----------( 224      ( 07 Jul 2022 07:34 )             39.7       07-07    129<L>  |  96  |  55<H>  ----------------------------<  131<H>  5.1   |  21<L>  |  1.18    Ca    9.7      07 Jul 2022 07:34  Phos  2.5     07-07  Mg     2.0     07-07    TPro  6.9  /  Alb  4.1  /  TBili  1.3<H>  /  DBili  x   /  AST  19  /  ALT  36  /  AlkPhos  67  07-06      eGFR: 69 mL/min/1.73m2 (07 Jul 2022 07:34)      05-20 Chol 159 Direct LDL -- LDL calculated 84 HDL 29<L> Trig 231<H>, 04-18 Chol 128 Direct LDL -- LDL calculated 41 HDL 34<L> Trig 267<H>, 04-18 Chol 142 Direct LDL -- LDL calculated 36 HDL 37<L> Trig 347<H>    Thyroid Function Tests:  06-22 @ 21:13 TSH 0.57 FreeT4 -- T3 -- Anti TPO -- Anti Thyroglobulin Ab -- TSI --          A1C with Estimated Average Glucose Result: 6.2 % (05-20-22 @ 14:17)  A1C with Estimated Average Glucose Result: 6.9 % (04-18-22 @ 15:40)  A1C with Estimated Average Glucose Result: 6.7 % (04-18-22 @ 15:05)      Estimated Average Glucose: 131 mg/dL (05-20-22 @ 14:17)  Estimated Average Glucose: 151 mg/dL (04-18-22 @ 15:40)  Estimated Average Glucose: 146 mg/dL (04-18-22 @ 15:05)

## 2022-07-07 NOTE — PROGRESS NOTE ADULT - PROBLEM SELECTOR PLAN 6
- overall stable though slightly elevated, likely related to over diuresis.   - off diuretics, please encourage patient to increase PO intake   - cardiorenal following  - Pt. will need follow up with nephrology as outpatient. Can follow up with Dr. Jaramillo in 4 weeks after discharge from the [facility].

## 2022-07-07 NOTE — PROGRESS NOTE ADULT - SUBJECTIVE AND OBJECTIVE BOX
INFECTIOUS DISEASES FOLLOW UP-- Arabella Escalante  [phone]    This is a follow up note for this  66yMale with  Cardiomyopathy        ROS:  CONSTITUTIONAL:  No fever, good appetite  CARDIOVASCULAR:  No chest pain or palpitations  RESPIRATORY:  No dyspnea  GASTROINTESTINAL:  No nausea, vomiting, diarrhea, or abdominal pain  GENITOURINARY:  No dysuria  NEUROLOGIC:  No headache,     Allergies    No Known Allergies    Intolerances        ANTIBIOTICS/RELEVANT:  antimicrobials  atovaquone  Suspension 1500 milliGRAM(s) Oral daily  cefepime   IVPB 1000 milliGRAM(s) IV Intermittent every 8 hours  nystatin    Suspension 799793 Unit(s) Oral <User Schedule>  valGANciclovir 450 milliGRAM(s) Oral every 12 hours  vancomycin    Solution 125 milliGRAM(s) Oral every 12 hours    immunologic:  mycophenolate mofetil 1000 milliGRAM(s) Oral every 12 hours  tacrolimus 2.5 milliGRAM(s) Oral <User Schedule>    OTHER:  chlorhexidine 2% Cloths 1 Application(s) Topical <User Schedule>  heparin   Injectable 5000 Unit(s) SubCutaneous every 8 hours  insulin glargine Injectable (LANTUS) 10 Unit(s) SubCutaneous at bedtime  insulin lispro (ADMELOG) corrective regimen sliding scale   SubCutaneous three times a day before meals  insulin lispro (ADMELOG) corrective regimen sliding scale   SubCutaneous at bedtime  insulin lispro Injectable (ADMELOG) 2 Unit(s) SubCutaneous three times a day before meals  magnesium oxide 400 milliGRAM(s) Oral three times a day with meals  multivitamin 1 Tablet(s) Oral daily  NIFEdipine XL 60 milliGRAM(s) Oral daily  pantoprazole    Tablet 40 milliGRAM(s) Oral before breakfast  polyethylene glycol 3350 17 Gram(s) Oral daily  predniSONE   Tablet 12.5 milliGRAM(s) Oral every 12 hours  rosuvastatin 5 milliGRAM(s) Oral every 24 hours  sildenafil (REVATIO) 10 milliGRAM(s) Oral three times a day  sodium zirconium cyclosilicate 10 Gram(s) Oral every 48 hours      Objective:  Vital Signs Last 24 Hrs  T(C): 36.5 (07 Jul 2022 11:12), Max: 36.7 (06 Jul 2022 23:12)  T(F): 97.7 (07 Jul 2022 11:12), Max: 98.1 (07 Jul 2022 03:10)  HR: 92 (07 Jul 2022 11:12) (84 - 94)  BP: 108/59 (07 Jul 2022 11:12) (108/59 - 138/72)  BP(mean): 78 (07 Jul 2022 11:12) (78 - 99)  RR: 18 (07 Jul 2022 11:12) (18 - 18)  SpO2: 97% (07 Jul 2022 11:12) (96% - 97%)    PHYSICAL EXAM:  Constitutional:no acute distress  Eyes:JOHNY, EOMI  Ear/Nose/Throat: no oral lesions, 	  Respiratory: clear BL  Cardiovascular: S1S2  Gastrointestinal:soft, (+) BS, no tenderness  Extremities:no e/e/c  No Lymphadenopathy  IV sites not inflammed.    LABS:                        13.4   14.54 )-----------( 224      ( 07 Jul 2022 07:34 )             39.7     07-07    132<L>  |  99  |  49<H>  ----------------------------<  202<H>  5.6<H>   |  18<L>  |  1.13    Ca    9.1      07 Jul 2022 12:07  Phos  2.5     07-07  Mg     2.0     07-07    TPro  6.9  /  Alb  4.1  /  TBili  1.3<H>  /  DBili  x   /  AST  19  /  ALT  36  /  AlkPhos  67  07-06          MICROBIOLOGY:            RECENT CULTURES:  07-03 @ 11:15  .Blood Blood  --  --  --    No growth to date.  --  07-03 @ 11:00  .Blood Blood  --  --  --    No growth to date.  --      RADIOLOGY & ADDITIONAL STUDIES:  < from: Xray Chest 1 View- PORTABLE-Routine (Xray Chest 1 View- PORTABLE-Routine .) (07.07.22 @ 05:26) >    Frontal expiratory view of the chest shows the heartto be similar in   size. The lungs are clear and there is no evidence of pneumothorax nor   pleural effusion.    IMPRESSION:  No active pulmonary disease.      < end of copied text >   INFECTIOUS DISEASES FOLLOW UP-- Arabella Escalante  [phone]    This is a follow up note for this  66yMale with  Cardiomyopathy, s/p heart transplant        ROS:  CONSTITUTIONAL:  No fever, good appetite  CARDIOVASCULAR:  No chest pain or palpitations  RESPIRATORY:  No dyspnea  GASTROINTESTINAL:  No nausea, vomiting, diarrhea, or abdominal pain  GENITOURINARY:  No dysuria  NEUROLOGIC:  No headache,     Allergies    No Known Allergies    Intolerances        ANTIBIOTICS/RELEVANT:  antimicrobials  atovaquone  Suspension 1500 milliGRAM(s) Oral daily  cefepime   IVPB 1000 milliGRAM(s) IV Intermittent every 8 hours  nystatin    Suspension 182296 Unit(s) Oral <User Schedule>  valGANciclovir 450 milliGRAM(s) Oral every 12 hours  vancomycin    Solution 125 milliGRAM(s) Oral every 12 hours    immunologic:  mycophenolate mofetil 1000 milliGRAM(s) Oral every 12 hours  tacrolimus 2.5 milliGRAM(s) Oral <User Schedule>    OTHER:  chlorhexidine 2% Cloths 1 Application(s) Topical <User Schedule>  heparin   Injectable 5000 Unit(s) SubCutaneous every 8 hours  insulin glargine Injectable (LANTUS) 10 Unit(s) SubCutaneous at bedtime  insulin lispro (ADMELOG) corrective regimen sliding scale   SubCutaneous three times a day before meals  insulin lispro (ADMELOG) corrective regimen sliding scale   SubCutaneous at bedtime  insulin lispro Injectable (ADMELOG) 2 Unit(s) SubCutaneous three times a day before meals  magnesium oxide 400 milliGRAM(s) Oral three times a day with meals  multivitamin 1 Tablet(s) Oral daily  NIFEdipine XL 60 milliGRAM(s) Oral daily  pantoprazole    Tablet 40 milliGRAM(s) Oral before breakfast  polyethylene glycol 3350 17 Gram(s) Oral daily  predniSONE   Tablet 12.5 milliGRAM(s) Oral every 12 hours  rosuvastatin 5 milliGRAM(s) Oral every 24 hours  sildenafil (REVATIO) 10 milliGRAM(s) Oral three times a day  sodium zirconium cyclosilicate 10 Gram(s) Oral every 48 hours      Objective:  Vital Signs Last 24 Hrs  T(C): 36.5 (07 Jul 2022 11:12), Max: 36.7 (06 Jul 2022 23:12)  T(F): 97.7 (07 Jul 2022 11:12), Max: 98.1 (07 Jul 2022 03:10)  HR: 92 (07 Jul 2022 11:12) (84 - 94)  BP: 108/59 (07 Jul 2022 11:12) (108/59 - 138/72)  BP(mean): 78 (07 Jul 2022 11:12) (78 - 99)  RR: 18 (07 Jul 2022 11:12) (18 - 18)  SpO2: 97% (07 Jul 2022 11:12) (96% - 97%)    PHYSICAL EXAM:  Constitutional:no acute distress  Eyes:JOHNY, EOMI  Ear/Nose/Throat: no oral lesions, 	  Respiratory: clear BL  old ICD site no erythema, healed  Cardiovascular: S1S2  Gastrointestinal:soft, (+) BS, no tenderness  Extremities:no e/e/c  No Lymphadenopathy  IV sites not inflammed.    LABS:                        13.4   14.54 )-----------( 224      ( 07 Jul 2022 07:34 )             39.7     07-07    132<L>  |  99  |  49<H>  ----------------------------<  202<H>  5.6<H>   |  18<L>  |  1.13    Ca    9.1      07 Jul 2022 12:07  Phos  2.5     07-07  Mg     2.0     07-07    TPro  6.9  /  Alb  4.1  /  TBili  1.3<H>  /  DBili  x   /  AST  19  /  ALT  36  /  AlkPhos  67  07-06          MICROBIOLOGY:            RECENT CULTURES:  07-03 @ 11:15  .Blood Blood  --  --  --    No growth to date.  --  07-03 @ 11:00  .Blood Blood  --  --  --    No growth to date.  --      RADIOLOGY & ADDITIONAL STUDIES:  < from: Xray Chest 1 View- PORTABLE-Routine (Xray Chest 1 View- PORTABLE-Routine .) (07.07.22 @ 05:26) >    Frontal expiratory view of the chest shows the heartto be similar in   size. The lungs are clear and there is no evidence of pneumothorax nor   pleural effusion.    IMPRESSION:  No active pulmonary disease.      < end of copied text >

## 2022-07-07 NOTE — DISCHARGE NOTE PROVIDER - NSDCFUADDINST_GEN_ALL_CORE_FT
1. Daily Shower  2. Weight yourself daily and notify any weight gain greater than 2-3 pounds in 24 hours.  3. Regular diabetic  diet , no concentrated potassium- low fat, low cholesterol, no added salt.  4. Cleanse Midsternal incision and leg incision daily while showering with warm water and mild soap, pat dry and maintain open to air.   5. Follow Cardiac Surgery Do's and Don'ts discharge instructions.   6. No driving until cleared by MD.   7. No heavy lifting nothing greater than 5 pounds until cleared by MD.   8. Call / Notify MD any fever greater than 101.0  9. Increase Activity as tolerated.

## 2022-07-07 NOTE — PROGRESS NOTE ADULT - ASSESSMENT
66M male with PMHx HTN, HLD, type 2 DM, chronic HFrEF,  (EF 25-30% by Echo) who underwent OHT on 6/21/22, currently on high dose steroid taper. Endocrinology consulted for assistance with management of steroid induced hyperglycemia/dm2.     Controlled type 2 diabetes mellitus with hyperglycemia.   home regimen: glimperide 1mg  Inpatient:  -Prednisone tapered to 12.5mg BID on 7/6 with BG at goal since dinner    -test BG AC/HS  BG Goal 100-180mg/dl   BG at goal past 24 hours c/w current regimen  - given FBG improved and steroids lowered, recommend decrease to Lantus to units QHS  -c/w Admelog 2 units AC meals  -c/w Admelog low correction scale AC and low HS scale  -cons carb diet  -insulin and glucometer teaching  completed      DC Planning:  egfr 69, LFT wnl, A1C 6.7 , discharging on pred 12.5mg bid  Discontinue glimperide  Send Rx for Lantus 10 units sq qhs    Send Rx for Metformin 500mg BID , can take with breakfast and dinner   If FBG  tomorrow morning, lower lantus dose to 8 units tomorrow night, if BG <70 call your doctors office for further adjustment. IF BG trending >200 during the day please call MD office to adjust regimen, f/u with MD if steroids are being tapered, will need adjustments to insulin regimen  send Rx  for diabetes supplies (glucometer, test strips, lancets, alcohol swabs, insulin pen needles). Routine outpatient opthalmology & podiatry evaluations recommended. Patient can follow up with endocrinology at the location provided below:   (emailed for appt awaiting appt details)   [facility]   [address] [place] [address]  [phone].     Problem/Plan - 2:  ·  Problem: HTN (hypertension).   ·  Plan: Recommendations:   - outpt BP goal < 130/80   - defer to primary team   - outpatient annual urinary microalb/cr ratio.     Problem/Plan - 3:  ·  Problem: HLD (hyperlipidemia).   ·  Plan: Recommendations:   - LDL goal < 70   - defer to primary team   - outpatient fasting lipid profile      Discussed with patient and primary  team Chacho NP   Contact via Microsoft Teams during business hours  On evenings and weekends, please call [phone] or page endocrine fellow on call.   Please note that this patient may be followed by different provider tomorrow.    Time spent on encounter: 26 minutes spent on total encounter; The necessity of the time spent during the encounter on this date of service was due to development of plan of care/coordination of care/glycemic control through review of labs, blood glucose values and vital signs.  66M male with PMHx HTN, HLD, type 2 DM, chronic HFrEF,  (EF 25-30% by Echo) who underwent OHT on 6/21/22, currently on high dose steroid taper. Endocrinology consulted for assistance with management of steroid induced hyperglycemia/dm2.     Controlled type 2 diabetes mellitus with hyperglycemia.   home regimen: glimperide 1mg  Inpatient:  -Prednisone tapered to 12.5mg BID on 7/6 with BG at goal since dinner    -test BG AC/HS  BG Goal 100-180mg/dl   BG at goal past 24 hours c/w current regimen  - given FBG improved and steroids lowered, recommend decrease to Lantus to 10 units QHS  -c/w Admelog 2 units AC meals  -c/w Admelog low correction scale AC and low HS scale  -cons carb diet  -insulin and glucometer teaching  completed      DC Planning:  egfr 69, LFT wnl, A1C 6.7 , discharging on pred 12.5mg bid  Discontinue glimperide  Send Rx for Lantus 10 units sq qhs    Send Rx for Metformin 500mg BID , can take with breakfast and dinner   If FBG  tomorrow morning, lower lantus dose to 8 units tomorrow night, if BG <70 call your doctors office for further adjustment. IF BG trending >200 during the day please call MD office to adjust regimen, f/u with MD if steroids are being tapered, will need adjustments to insulin regimen  send Rx  for diabetes supplies (glucometer, test strips, lancets, alcohol swabs, insulin pen needles). Routine outpatient opthalmology & podiatry evaluations recommended. Patient can follow up with endocrinology at the location provided below:   (emailed for appt awaiting appt details)   [facility]   [address] [place] [address]  [phone].     Problem/Plan - 2:  ·  Problem: HTN (hypertension).   ·  Plan: Recommendations:   - outpt BP goal < 130/80   - defer to primary team   - outpatient annual urinary microalb/cr ratio.     Problem/Plan - 3:  ·  Problem: HLD (hyperlipidemia).   ·  Plan: Recommendations:   - LDL goal < 70   - defer to primary team   - outpatient fasting lipid profile      Discussed with patient and primary  team Chacho NP   Contact via Microsoft Teams during business hours  On evenings and weekends, please call [phone] or page endocrine fellow on call.   Please note that this patient may be followed by different provider tomorrow.    Time spent on encounter: 26 minutes spent on total encounter; The necessity of the time spent during the encounter on this date of service was due to development of plan of care/coordination of care/glycemic control through review of labs, blood glucose values and vital signs.  66M male with PMHx HTN, HLD, type 2 DM, chronic HFrEF,  (EF 25-30% by Echo) who underwent OHT on 6/21/22, currently on high dose steroid taper. Endocrinology consulted for assistance with management of steroid induced hyperglycemia/dm2.     Controlled type 2 diabetes mellitus with hyperglycemia.   home regimen: glimperide 1mg  Inpatient:  -Prednisone tapered to 12.5mg BID on 7/6 with BG at goal since dinner    -test BG AC/HS  BG Goal 100-180mg/dl   BG at goal past 24 hours c/w current regimen  - given FBG improved and steroids lowered, recommend decrease to Lantus to 10 units QHS  -c/w Admelog 2 units AC meals  -c/w Admelog low correction scale AC and low HS scale  -cons carb diet  -insulin and glucometer teaching  completed      DC Planning:  egfr 69, LFT wnl, A1C 6.7 , discharging on pred 12.5mg bid  Discontinue glimperide  Send Rx for Lantus 10 units sq qhs    Send Rx for Metformin 500mg BID , can take with breakfast and dinner   If FBG  tomorrow morning, lower lantus dose to 8 units tomorrow night, if BG <70 call your doctors office for further adjustment. IF BG trending >200 during the day please call MD office to adjust regimen, f/u with MD if steroids are being tapered, will need adjustments to insulin regimen  send Rx  for diabetes supplies (glucometer, test strips, lancets, alcohol swabs, insulin pen needles). Routine outpatient opthalmology & podiatry evaluations recommended. Patient can follow up with endocrinology at the location provided below:   (emailed for appt awaiting appt details)   [facility]   [address] [place] [address]  [phone].  APPTS:  Anahi on 7/29/22 @ 1:15pm and, with Dr. Ferguson on 9/1/22 @ 1:40pm.     Problem/Plan - 2:  ·  Problem: HTN (hypertension).   ·  Plan: Recommendations:   - outpt BP goal < 130/80   - defer to primary team   - outpatient annual urinary microalb/cr ratio.     Problem/Plan - 3:  ·  Problem: HLD (hyperlipidemia).   ·  Plan: Recommendations:   - LDL goal < 70   - defer to primary team   - outpatient fasting lipid profile      Discussed with patient and primary  team Chacho NP   Contact via Microsoft Teams during business hours  On evenings and weekends, please call [phone] or page endocrine fellow on call.   Please note that this patient may be followed by different provider tomorrow.    Time spent on encounter: 26 minutes spent on total encounter; The necessity of the time spent during the encounter on this date of service was due to development of plan of care/coordination of care/glycemic control through review of labs, blood glucose values and vital signs.

## 2022-07-07 NOTE — PROGRESS NOTE ADULT - ASSESSMENT
Assessment:  1. L partially occlusive thrombus affecting the left subclavian vein  - 7/6/22:   - 7/6/22: VA duplex of LUE unchanged medial segment of left subclavian vein   - 6/30/22: No DVTs   - 6/28/22: L partially occlusive subclavian thrombus     2. L radial artery thrombus from mid to distal radial artery   - 7/6/22: Arterial duplex shows L radial artery occlusion mid to distal     3. Superficial R UE VT  - 06/28/22: R Basilic, cephalic and superficial veins thrombosed    4. S/p OHT on 6/21  5. JAGRUTI     Plan:  Recommend switched heparin subq to eliquis 2.5mg BID given left radial artery thrombus and L subclavian venous clot   Recommend aspirin 81mg for radial artery thrombus   O2 saturation normal on RA   Appreciate excellent CT surgery care    Thank you    Jonathan Lima MD   Internal Medicine PGY3     Please call with any questions:   Service Line: [phone]  Office [phone]

## 2022-07-07 NOTE — DISCHARGE NOTE PROVIDER - NSDCHHNEEDSERVICE_GEN_ALL_CORE
Medication teaching and assessment/Observation and assessment/Ostomy care and management/Rehabilitation services/Teaching and training/Wound care and assessment

## 2022-07-07 NOTE — DISCHARGE NOTE PROVIDER - NSDCCPCAREPLAN_GEN_ALL_CORE_FT
PRINCIPAL DISCHARGE DIAGNOSIS  Diagnosis: S/P orthotopic heart transplant  Assessment and Plan of Treatment:       SECONDARY DISCHARGE DIAGNOSES  Diagnosis: Infection involving implantable cardioverter-defibrillator (ICD)  Assessment and Plan of Treatment:

## 2022-07-07 NOTE — DISCHARGE NOTE PROVIDER - NSDCFUSCHEDAPPT_GEN_ALL_CORE_FT
[facility]  ECH[address]  Scheduled Appointment: 07/12/2022    Anahi Chambers  [facility]  END[address]CRIN [address] [facility]  Scheduled Appointment: 07/29/2022    Sarai Ferguson  [facility]  [address] [facility]  Scheduled Appointment: 09/01/2022

## 2022-07-07 NOTE — DISCHARGE NOTE PROVIDER - NSDCFUADDAPPT_GEN_ALL_CORE_FT
YOU WILL NEED  TO HAVE BLOOD WORK AND A COVID TEST ON MONDAY 7/11 IN PREPARATION FOR YOUR BIOPSY.  THE HEART TRANSPLANT COORDINATORS WILL CONTACT YOU WITH TIMING  THEY WILL ALSO CONTACT YOU WITH FURTHER APPOINTMENTS     YOU HAVE AN APPOINTMENT  W THE ENDOCRINOLOGY STAFF TO  HELP MANAGE YOUR GLUCOSE ON 7/29 @ 1:15    YOU HAVE A F/U ENDOCRINOLOGY APPOINTMENT    ON 9/1 WITH  DR SHY HELMS  AT 1:40 YOU WILL NEED  TO HAVE BLOOD WORK AND A COVID TEST ON MONDAY 7/11 IN PREPARATION FOR YOUR BIOPSY on WEDNESDAY 7/13  THE HEART TRANSPLANT COORDINATORS WILL CONTACT YOU WITH TIMING  THEY WILL ALSO CONTACT YOU WITH FURTHER APPOINTMENTS     YOU HAVE AN APPOINTMENT  W THE ENDOCRINOLOGY STAFF TO  HELP MANAGE YOUR GLUCOSE ON 7/29 @ 1:15    YOU HAVE A F/U ENDOCRINOLOGY APPOINTMENT    ON 9/1 WITH  DR SHY HELMS  AT 1:40

## 2022-07-07 NOTE — DISCHARGE NOTE NURSING/CASE MANAGEMENT/SOCIAL WORK - NSDCPEFALRISK_GEN_ALL_CORE
For information on Fall & Injury Prevention, visit: [email or web address] OR  [email or web address] OR  https://www.cdc.gov/steadi/patient.html

## 2022-07-07 NOTE — DISCHARGE NOTE NURSING/CASE MANAGEMENT/SOCIAL WORK - NSDCVIVACCINE_GEN_ALL_CORE_FT
HepB-CpG; 03-Jun-2022 06:31; Kelsy Barakat (RN); [employer] [id number] (Exp. Date: 11-Mar-2023); IntraMuscular; Deltoid Right.; 20 MICROGram(s); VIS (VIS Published: 15-Oct-2021, VIS Presented: 03-Jun-2022);   influenza, injectable, quadrivalent, preservative free; 10-Gus-2022 06:03; Sachi Nicholson); [employer]; [id number] (Exp. Date: 30-Jun-2022); IntraMuscular; Deltoid Right.; 0.5 milliLiter(s); VIS (VIS Published: 06-Aug-2021, VIS Presented: 10-Gus-2022);

## 2022-07-08 VITALS — HEART RATE: 101 BPM | DIASTOLIC BLOOD PRESSURE: 64 MMHG | SYSTOLIC BLOOD PRESSURE: 114 MMHG

## 2022-07-08 LAB
ANION GAP SERPL CALC-SCNC: 14 MMOL/L — SIGNIFICANT CHANGE UP (ref 5–17)
BUN SERPL-MCNC: 40 MG/DL — HIGH (ref 7–23)
CALCIUM SERPL-MCNC: 9.8 MG/DL — SIGNIFICANT CHANGE UP (ref 8.4–10.5)
CHLORIDE SERPL-SCNC: 96 MMOL/L — SIGNIFICANT CHANGE UP (ref 96–108)
CO2 SERPL-SCNC: 23 MMOL/L — SIGNIFICANT CHANGE UP (ref 22–31)
CREAT SERPL-MCNC: 1.32 MG/DL — HIGH (ref 0.5–1.3)
CULTURE RESULTS: SIGNIFICANT CHANGE UP
CULTURE RESULTS: SIGNIFICANT CHANGE UP
EGFR: 60 ML/MIN/1.73M2 — SIGNIFICANT CHANGE UP
GLUCOSE BLDC GLUCOMTR-MCNC: 118 MG/DL — HIGH (ref 70–99)
GLUCOSE BLDC GLUCOMTR-MCNC: 189 MG/DL — HIGH (ref 70–99)
GLUCOSE SERPL-MCNC: 135 MG/DL — HIGH (ref 70–99)
HCT VFR BLD CALC: 41.6 % — SIGNIFICANT CHANGE UP (ref 39–50)
HGB BLD-MCNC: 13.8 G/DL — SIGNIFICANT CHANGE UP (ref 13–17)
MAGNESIUM SERPL-MCNC: 1.8 MG/DL — SIGNIFICANT CHANGE UP (ref 1.6–2.6)
MCHC RBC-ENTMCNC: 30.1 PG — SIGNIFICANT CHANGE UP (ref 27–34)
MCHC RBC-ENTMCNC: 33.2 GM/DL — SIGNIFICANT CHANGE UP (ref 32–36)
MCV RBC AUTO: 90.8 FL — SIGNIFICANT CHANGE UP (ref 80–100)
NRBC # BLD: 0 /100 WBCS — SIGNIFICANT CHANGE UP (ref 0–0)
PHOSPHATE SERPL-MCNC: 2 MG/DL — LOW (ref 2.5–4.5)
PLATELET # BLD AUTO: 249 K/UL — SIGNIFICANT CHANGE UP (ref 150–400)
POTASSIUM SERPL-MCNC: 4.9 MMOL/L — SIGNIFICANT CHANGE UP (ref 3.5–5.3)
POTASSIUM SERPL-SCNC: 4.9 MMOL/L — SIGNIFICANT CHANGE UP (ref 3.5–5.3)
RBC # BLD: 4.58 M/UL — SIGNIFICANT CHANGE UP (ref 4.2–5.8)
RBC # FLD: 18.8 % — HIGH (ref 10.3–14.5)
SODIUM SERPL-SCNC: 133 MMOL/L — LOW (ref 135–145)
SPECIMEN SOURCE: SIGNIFICANT CHANGE UP
SPECIMEN SOURCE: SIGNIFICANT CHANGE UP
TACROLIMUS SERPL-MCNC: 12.8 NG/ML — SIGNIFICANT CHANGE UP
WBC # BLD: 16.87 K/UL — HIGH (ref 3.8–10.5)
WBC # FLD AUTO: 16.87 K/UL — HIGH (ref 3.8–10.5)

## 2022-07-08 PROCEDURE — 87517 HEPATITIS B DNA QUANT: CPT

## 2022-07-08 PROCEDURE — C1751: CPT

## 2022-07-08 PROCEDURE — C1729: CPT

## 2022-07-08 PROCEDURE — 82600 ASSAY OF CYANIDE: CPT

## 2022-07-08 PROCEDURE — 36430 TRANSFUSION BLD/BLD COMPNT: CPT

## 2022-07-08 PROCEDURE — U0003: CPT

## 2022-07-08 PROCEDURE — 87340 HEPATITIS B SURFACE AG IA: CPT

## 2022-07-08 PROCEDURE — C8929: CPT

## 2022-07-08 PROCEDURE — 85025 COMPLETE CBC W/AUTO DIFF WBC: CPT

## 2022-07-08 PROCEDURE — 82947 ASSAY GLUCOSE BLOOD QUANT: CPT

## 2022-07-08 PROCEDURE — 83880 ASSAY OF NATRIURETIC PEPTIDE: CPT

## 2022-07-08 PROCEDURE — 88305 TISSUE EXAM BY PATHOLOGIST: CPT

## 2022-07-08 PROCEDURE — 93451 RIGHT HEART CATH: CPT

## 2022-07-08 PROCEDURE — 97165 OT EVAL LOW COMPLEX 30 MIN: CPT

## 2022-07-08 PROCEDURE — 86708 HEPATITIS A ANTIBODY: CPT

## 2022-07-08 PROCEDURE — 82803 BLOOD GASES ANY COMBINATION: CPT

## 2022-07-08 PROCEDURE — 86905 BLOOD TYPING RBC ANTIGENS: CPT

## 2022-07-08 PROCEDURE — 82150 ASSAY OF AMYLASE: CPT

## 2022-07-08 PROCEDURE — 87040 BLOOD CULTURE FOR BACTERIA: CPT

## 2022-07-08 PROCEDURE — 80202 ASSAY OF VANCOMYCIN: CPT

## 2022-07-08 PROCEDURE — 82435 ASSAY OF BLOOD CHLORIDE: CPT

## 2022-07-08 PROCEDURE — 87522 HEPATITIS C REVRS TRNSCRPJ: CPT

## 2022-07-08 PROCEDURE — 70450 CT HEAD/BRAIN W/O DYE: CPT

## 2022-07-08 PROCEDURE — 83735 ASSAY OF MAGNESIUM: CPT

## 2022-07-08 PROCEDURE — 36415 COLL VENOUS BLD VENIPUNCTURE: CPT

## 2022-07-08 PROCEDURE — 82553 CREATINE MB FRACTION: CPT

## 2022-07-08 PROCEDURE — C1889: CPT

## 2022-07-08 PROCEDURE — 83930 ASSAY OF BLOOD OSMOLALITY: CPT

## 2022-07-08 PROCEDURE — 93971 EXTREMITY STUDY: CPT

## 2022-07-08 PROCEDURE — 82565 ASSAY OF CREATININE: CPT

## 2022-07-08 PROCEDURE — 84481 FREE ASSAY (FT-3): CPT

## 2022-07-08 PROCEDURE — 81001 URINALYSIS AUTO W/SCOPE: CPT

## 2022-07-08 PROCEDURE — 86665 EPSTEIN-BARR CAPSID VCA: CPT

## 2022-07-08 PROCEDURE — 86703 HIV-1/HIV-2 1 RESULT ANTBDY: CPT

## 2022-07-08 PROCEDURE — 93505 ENDOMYOCARDIAL BIOPSY: CPT

## 2022-07-08 PROCEDURE — 85396 CLOTTING ASSAY WHOLE BLOOD: CPT

## 2022-07-08 PROCEDURE — 82668 ASSAY OF ERYTHROPOIETIN: CPT

## 2022-07-08 PROCEDURE — 86900 BLOOD TYPING SEROLOGIC ABO: CPT

## 2022-07-08 PROCEDURE — P9041: CPT

## 2022-07-08 PROCEDURE — 97530 THERAPEUTIC ACTIVITIES: CPT

## 2022-07-08 PROCEDURE — 97116 GAIT TRAINING THERAPY: CPT

## 2022-07-08 PROCEDURE — 86644 CMV ANTIBODY: CPT

## 2022-07-08 PROCEDURE — 93925 LOWER EXTREMITY STUDY: CPT

## 2022-07-08 PROCEDURE — 86664 EPSTEIN-BARR NUCLEAR ANTIGEN: CPT

## 2022-07-08 PROCEDURE — 85652 RBC SED RATE AUTOMATED: CPT

## 2022-07-08 PROCEDURE — 85018 HEMOGLOBIN: CPT

## 2022-07-08 PROCEDURE — 84100 ASSAY OF PHOSPHORUS: CPT

## 2022-07-08 PROCEDURE — 82330 ASSAY OF CALCIUM: CPT

## 2022-07-08 PROCEDURE — 93321 DOPPLER ECHO F-UP/LMTD STD: CPT

## 2022-07-08 PROCEDURE — 85730 THROMBOPLASTIN TIME PARTIAL: CPT

## 2022-07-08 PROCEDURE — 87536 HIV-1 QUANT&REVRSE TRNSCRPJ: CPT

## 2022-07-08 PROCEDURE — 83605 ASSAY OF LACTIC ACID: CPT

## 2022-07-08 PROCEDURE — 82570 ASSAY OF URINE CREATININE: CPT

## 2022-07-08 PROCEDURE — 86704 HEP B CORE ANTIBODY TOTAL: CPT

## 2022-07-08 PROCEDURE — 87116 MYCOBACTERIA CULTURE: CPT

## 2022-07-08 PROCEDURE — 93923 UPR/LXTR ART STDY 3+ LVLS: CPT

## 2022-07-08 PROCEDURE — 84550 ASSAY OF BLOOD/URIC ACID: CPT

## 2022-07-08 PROCEDURE — 86431 RHEUMATOID FACTOR QUANT: CPT

## 2022-07-08 PROCEDURE — 88307 TISSUE EXAM BY PATHOLOGIST: CPT

## 2022-07-08 PROCEDURE — 86140 C-REACTIVE PROTEIN: CPT

## 2022-07-08 PROCEDURE — 84156 ASSAY OF PROTEIN URINE: CPT

## 2022-07-08 PROCEDURE — 85384 FIBRINOGEN ACTIVITY: CPT

## 2022-07-08 PROCEDURE — 80197 ASSAY OF TACROLIMUS: CPT

## 2022-07-08 PROCEDURE — 86850 RBC ANTIBODY SCREEN: CPT

## 2022-07-08 PROCEDURE — G0480: CPT

## 2022-07-08 PROCEDURE — 82550 ASSAY OF CK (CPK): CPT

## 2022-07-08 PROCEDURE — 88309 TISSUE EXAM BY PATHOLOGIST: CPT

## 2022-07-08 PROCEDURE — 86880 COOMBS TEST DIRECT: CPT

## 2022-07-08 PROCEDURE — 84439 ASSAY OF FREE THYROXINE: CPT

## 2022-07-08 PROCEDURE — 86777 TOXOPLASMA ANTIBODY: CPT

## 2022-07-08 PROCEDURE — 71045 X-RAY EXAM CHEST 1 VIEW: CPT

## 2022-07-08 PROCEDURE — 86787 VARICELLA-ZOSTER ANTIBODY: CPT

## 2022-07-08 PROCEDURE — 90739 HEPB VACC 2/4 DOSE ADULT IM: CPT

## 2022-07-08 PROCEDURE — 86706 HEP B SURFACE ANTIBODY: CPT

## 2022-07-08 PROCEDURE — 0027U JAK2 GENE TRGT SEQ ALYS: CPT

## 2022-07-08 PROCEDURE — 74018 RADEX ABDOMEN 1 VIEW: CPT

## 2022-07-08 PROCEDURE — 86695 HERPES SIMPLEX TYPE 1 TEST: CPT

## 2022-07-08 PROCEDURE — 99233 SBSQ HOSP IP/OBS HIGH 50: CPT | Mod: GC

## 2022-07-08 PROCEDURE — 87640 STAPH A DNA AMP PROBE: CPT

## 2022-07-08 PROCEDURE — 82962 GLUCOSE BLOOD TEST: CPT

## 2022-07-08 PROCEDURE — 88346 IMFLUOR 1ST 1ANTB STAIN PX: CPT

## 2022-07-08 PROCEDURE — 87015 SPECIMEN INFECT AGNT CONCNTJ: CPT

## 2022-07-08 PROCEDURE — 94799 UNLISTED PULMONARY SVC/PX: CPT

## 2022-07-08 PROCEDURE — P9016: CPT

## 2022-07-08 PROCEDURE — C1887: CPT

## 2022-07-08 PROCEDURE — 84300 ASSAY OF URINE SODIUM: CPT

## 2022-07-08 PROCEDURE — 85014 HEMATOCRIT: CPT

## 2022-07-08 PROCEDURE — 80048 BASIC METABOLIC PNL TOTAL CA: CPT

## 2022-07-08 PROCEDURE — 86780 TREPONEMA PALLIDUM: CPT

## 2022-07-08 PROCEDURE — 86790 VIRUS ANTIBODY NOS: CPT

## 2022-07-08 PROCEDURE — 99153 MOD SED SAME PHYS/QHP EA: CPT

## 2022-07-08 PROCEDURE — C1769: CPT

## 2022-07-08 PROCEDURE — 80307 DRUG TEST PRSMV CHEM ANLYZR: CPT

## 2022-07-08 PROCEDURE — 84165 PROTEIN E-PHORESIS SERUM: CPT

## 2022-07-08 PROCEDURE — 93308 TTE F-UP OR LMTD: CPT

## 2022-07-08 PROCEDURE — 86891 AUTOLOGOUS BLOOD OP SALVAGE: CPT

## 2022-07-08 PROCEDURE — 87206 SMEAR FLUORESCENT/ACID STAI: CPT

## 2022-07-08 PROCEDURE — 87641 MR-STAPH DNA AMP PROBE: CPT

## 2022-07-08 PROCEDURE — 82977 ASSAY OF GGT: CPT

## 2022-07-08 PROCEDURE — 93970 EXTREMITY STUDY: CPT

## 2022-07-08 PROCEDURE — 87070 CULTURE OTHR SPECIMN AEROBIC: CPT

## 2022-07-08 PROCEDURE — 99232 SBSQ HOSP IP/OBS MODERATE 35: CPT | Mod: 24

## 2022-07-08 PROCEDURE — 83690 ASSAY OF LIPASE: CPT

## 2022-07-08 PROCEDURE — 83521 IG LIGHT CHAINS FREE EACH: CPT

## 2022-07-08 PROCEDURE — 33967 INSERT I-AORT PERCUT DEVICE: CPT

## 2022-07-08 PROCEDURE — 87102 FUNGUS ISOLATION CULTURE: CPT

## 2022-07-08 PROCEDURE — 74176 CT ABD & PELVIS W/O CONTRAST: CPT

## 2022-07-08 PROCEDURE — 84153 ASSAY OF PSA TOTAL: CPT

## 2022-07-08 PROCEDURE — 0225U NFCT DS DNA&RNA 21 SARSCOV2: CPT

## 2022-07-08 PROCEDURE — 87077 CULTURE AEROBIC IDENTIFY: CPT

## 2022-07-08 PROCEDURE — 86901 BLOOD TYPING SEROLOGIC RH(D): CPT

## 2022-07-08 PROCEDURE — 74263 CT COLONOGRAPHY SCREENING: CPT

## 2022-07-08 PROCEDURE — 86698 HISTOPLASMA ANTIBODY: CPT

## 2022-07-08 PROCEDURE — 73110 X-RAY EXAM OF WRIST: CPT

## 2022-07-08 PROCEDURE — 84443 ASSAY THYROID STIM HORMONE: CPT

## 2022-07-08 PROCEDURE — 86735 MUMPS ANTIBODY: CPT

## 2022-07-08 PROCEDURE — 87205 SMEAR GRAM STAIN: CPT

## 2022-07-08 PROCEDURE — 86663 EPSTEIN-BARR ANTIBODY: CPT

## 2022-07-08 PROCEDURE — 84484 ASSAY OF TROPONIN QUANT: CPT

## 2022-07-08 PROCEDURE — 86696 HERPES SIMPLEX TYPE 2 TEST: CPT

## 2022-07-08 PROCEDURE — 97161 PT EVAL LOW COMPLEX 20 MIN: CPT

## 2022-07-08 PROCEDURE — 94010 BREATHING CAPACITY TEST: CPT

## 2022-07-08 PROCEDURE — 86765 RUBEOLA ANTIBODY: CPT

## 2022-07-08 PROCEDURE — 99233 SBSQ HOSP IP/OBS HIGH 50: CPT

## 2022-07-08 PROCEDURE — 87186 SC STD MICRODIL/AGAR DIL: CPT

## 2022-07-08 PROCEDURE — 86923 COMPATIBILITY TEST ELECTRIC: CPT

## 2022-07-08 PROCEDURE — U0005: CPT

## 2022-07-08 PROCEDURE — 31720 CLEARANCE OF AIRWAYS: CPT

## 2022-07-08 PROCEDURE — 82728 ASSAY OF FERRITIN: CPT

## 2022-07-08 PROCEDURE — 93978 VASCULAR STUDY: CPT

## 2022-07-08 PROCEDURE — 83550 IRON BINDING TEST: CPT

## 2022-07-08 PROCEDURE — 93005 ELECTROCARDIOGRAM TRACING: CPT

## 2022-07-08 PROCEDURE — 83615 LACTATE (LD) (LDH) ENZYME: CPT

## 2022-07-08 PROCEDURE — 86480 TB TEST CELL IMMUN MEASURE: CPT

## 2022-07-08 PROCEDURE — 85610 PROTHROMBIN TIME: CPT

## 2022-07-08 PROCEDURE — 84166 PROTEIN E-PHORESIS/URINE/CSF: CPT

## 2022-07-08 PROCEDURE — 87075 CULTR BACTERIA EXCEPT BLOOD: CPT

## 2022-07-08 PROCEDURE — P9045: CPT

## 2022-07-08 PROCEDURE — 86038 ANTINUCLEAR ANTIBODIES: CPT

## 2022-07-08 PROCEDURE — 86753 PROTOZOA ANTIBODY NOS: CPT

## 2022-07-08 PROCEDURE — C1894: CPT

## 2022-07-08 PROCEDURE — 76981 USE PARENCHYMA: CPT

## 2022-07-08 PROCEDURE — 86762 RUBELLA ANTIBODY: CPT

## 2022-07-08 PROCEDURE — 86769 SARS-COV-2 COVID-19 ANTIBODY: CPT

## 2022-07-08 PROCEDURE — 93931 UPPER EXTREMITY STUDY: CPT

## 2022-07-08 PROCEDURE — 84134 ASSAY OF PREALBUMIN: CPT

## 2022-07-08 PROCEDURE — 81270 JAK2 GENE: CPT

## 2022-07-08 PROCEDURE — 86682 HELMINTH ANTIBODY: CPT

## 2022-07-08 PROCEDURE — 94003 VENT MGMT INPAT SUBQ DAY: CPT

## 2022-07-08 PROCEDURE — 84295 ASSAY OF SERUM SODIUM: CPT

## 2022-07-08 PROCEDURE — 90686 IIV4 VACC NO PRSV 0.5 ML IM: CPT

## 2022-07-08 PROCEDURE — 84132 ASSAY OF SERUM POTASSIUM: CPT

## 2022-07-08 PROCEDURE — 83540 ASSAY OF IRON: CPT

## 2022-07-08 PROCEDURE — 84133 ASSAY OF URINE POTASSIUM: CPT

## 2022-07-08 PROCEDURE — 93880 EXTRACRANIAL BILAT STUDY: CPT

## 2022-07-08 PROCEDURE — 85027 COMPLETE CBC AUTOMATED: CPT

## 2022-07-08 PROCEDURE — 99152 MOD SED SAME PHYS/QHP 5/>YRS: CPT

## 2022-07-08 PROCEDURE — 71250 CT THORAX DX C-: CPT

## 2022-07-08 PROCEDURE — 76700 US EXAM ABDOM COMPLETE: CPT

## 2022-07-08 PROCEDURE — 86692 HEPATITIS DELTA AGENT ANTBDY: CPT

## 2022-07-08 PROCEDURE — P9047: CPT

## 2022-07-08 PROCEDURE — 97535 SELF CARE MNGMENT TRAINING: CPT

## 2022-07-08 PROCEDURE — 83935 ASSAY OF URINE OSMOLALITY: CPT

## 2022-07-08 PROCEDURE — 80053 COMPREHEN METABOLIC PANEL: CPT

## 2022-07-08 RX ORDER — APIXABAN 2.5 MG/1
1 TABLET, FILM COATED ORAL
Qty: 0 | Refills: 0 | DISCHARGE
Start: 2022-07-08

## 2022-07-08 RX ORDER — SULFAMETHOXAZOLE AND TRIMETHOPRIM 400; 80 MG/1; MG/1
400-80 TABLET ORAL
Qty: 30 | Refills: 5 | Status: DISCONTINUED | COMMUNITY
Start: 2022-06-29 | End: 2022-07-08

## 2022-07-08 RX ORDER — MAGNESIUM OXIDE 400 MG ORAL TABLET 241.3 MG
1 TABLET ORAL
Qty: 0 | Refills: 0 | DISCHARGE
Start: 2022-07-08

## 2022-07-08 RX ORDER — SODIUM ZIRCONIUM CYCLOSILICATE 10 G/10G
10 POWDER, FOR SUSPENSION ORAL EVERY 24 HOURS
Refills: 0 | Status: DISCONTINUED | OUTPATIENT
Start: 2022-07-08 | End: 2022-07-08

## 2022-07-08 RX ORDER — FUROSEMIDE 40 MG
1 TABLET ORAL
Qty: 0 | Refills: 0 | DISCHARGE

## 2022-07-08 RX ORDER — ERGOCALCIFEROL 1.25 MG/1
1 CAPSULE ORAL
Qty: 0 | Refills: 0 | DISCHARGE

## 2022-07-08 RX ORDER — INSULIN LISPRO-AABC 100 [IU]/ML
100 INJECTION, SOLUTION SUBCUTANEOUS
Qty: 15 | Refills: 0 | Status: DISCONTINUED | COMMUNITY
Start: 2022-06-29 | End: 2022-07-08

## 2022-07-08 RX ORDER — PANTOPRAZOLE SODIUM 20 MG/1
1 TABLET, DELAYED RELEASE ORAL
Qty: 0 | Refills: 0 | DISCHARGE
Start: 2022-07-08

## 2022-07-08 RX ORDER — MYCOPHENOLATE MOFETIL 250 MG/1
3 CAPSULE ORAL
Qty: 0 | Refills: 0 | DISCHARGE
Start: 2022-07-08

## 2022-07-08 RX ORDER — NIFEDIPINE 30 MG
1 TABLET, EXTENDED RELEASE 24 HR ORAL
Qty: 0 | Refills: 0 | DISCHARGE
Start: 2022-07-08

## 2022-07-08 RX ORDER — SODIUM ZIRCONIUM CYCLOSILICATE 10 G/10G
10 POWDER, FOR SUSPENSION ORAL
Qty: 0 | Refills: 0 | DISCHARGE
Start: 2022-07-08

## 2022-07-08 RX ORDER — ATOVAQUONE 750 MG/5ML
10 SUSPENSION ORAL
Qty: 0 | Refills: 0 | DISCHARGE
Start: 2022-07-08

## 2022-07-08 RX ORDER — TACROLIMUS 5 MG/1
5 CAPSULE ORAL
Qty: 60 | Refills: 5 | Status: DISCONTINUED | COMMUNITY
Start: 2022-06-29 | End: 2022-07-08

## 2022-07-08 RX ORDER — MYCOPHENOLATE MOFETIL 250 MG/1
4 CAPSULE ORAL
Qty: 0 | Refills: 0 | DISCHARGE
Start: 2022-07-08

## 2022-07-08 RX ORDER — ASPIRIN/CALCIUM CARB/MAGNESIUM 324 MG
81 TABLET ORAL DAILY
Refills: 0 | Status: DISCONTINUED | OUTPATIENT
Start: 2022-07-08 | End: 2022-07-08

## 2022-07-08 RX ORDER — SACUBITRIL AND VALSARTAN 24; 26 MG/1; MG/1
1 TABLET, FILM COATED ORAL
Qty: 0 | Refills: 0 | DISCHARGE

## 2022-07-08 RX ORDER — VALGANCICLOVIR 450 MG/1
1 TABLET, FILM COATED ORAL
Qty: 0 | Refills: 0 | DISCHARGE
Start: 2022-07-08

## 2022-07-08 RX ORDER — GLIMEPIRIDE 1 MG
1 TABLET ORAL
Qty: 0 | Refills: 0 | DISCHARGE

## 2022-07-08 RX ORDER — ALLOPURINOL 300 MG
1 TABLET ORAL
Qty: 0 | Refills: 0 | DISCHARGE

## 2022-07-08 RX ORDER — CARVEDILOL PHOSPHATE 80 MG/1
2 CAPSULE, EXTENDED RELEASE ORAL
Qty: 0 | Refills: 0 | DISCHARGE

## 2022-07-08 RX ORDER — CEFPODOXIME PROXETIL 100 MG
200 TABLET ORAL EVERY 12 HOURS
Refills: 0 | Status: DISCONTINUED | OUTPATIENT
Start: 2022-07-08 | End: 2022-07-08

## 2022-07-08 RX ORDER — ASPIRIN/CALCIUM CARB/MAGNESIUM 324 MG
1 TABLET ORAL
Qty: 0 | Refills: 0 | DISCHARGE
Start: 2022-07-08

## 2022-07-08 RX ADMIN — Medication 2 UNIT(S): at 07:51

## 2022-07-08 RX ADMIN — Medication 12.5 MILLIGRAM(S): at 07:49

## 2022-07-08 RX ADMIN — Medication 60 MILLIGRAM(S): at 05:03

## 2022-07-08 RX ADMIN — Medication 1 TABLET(S): at 11:58

## 2022-07-08 RX ADMIN — Medication 10 MILLIGRAM(S): at 05:03

## 2022-07-08 RX ADMIN — SODIUM ZIRCONIUM CYCLOSILICATE 10 GRAM(S): 10 POWDER, FOR SUSPENSION ORAL at 11:58

## 2022-07-08 RX ADMIN — Medication 500000 UNIT(S): at 07:48

## 2022-07-08 RX ADMIN — CHLORHEXIDINE GLUCONATE 1 APPLICATION(S): 213 SOLUTION TOPICAL at 05:04

## 2022-07-08 RX ADMIN — Medication 81 MILLIGRAM(S): at 14:08

## 2022-07-08 RX ADMIN — CEFEPIME 100 MILLIGRAM(S): 1 INJECTION, POWDER, FOR SOLUTION INTRAMUSCULAR; INTRAVENOUS at 14:07

## 2022-07-08 RX ADMIN — Medication 1: at 12:20

## 2022-07-08 RX ADMIN — PANTOPRAZOLE SODIUM 40 MILLIGRAM(S): 20 TABLET, DELAYED RELEASE ORAL at 05:04

## 2022-07-08 RX ADMIN — ATOVAQUONE 1500 MILLIGRAM(S): 750 SUSPENSION ORAL at 11:58

## 2022-07-08 RX ADMIN — VALGANCICLOVIR 450 MILLIGRAM(S): 450 TABLET, FILM COATED ORAL at 07:48

## 2022-07-08 RX ADMIN — TACROLIMUS 2.5 MILLIGRAM(S): 5 CAPSULE ORAL at 07:48

## 2022-07-08 RX ADMIN — Medication 125 MILLIGRAM(S): at 07:48

## 2022-07-08 RX ADMIN — Medication 500000 UNIT(S): at 11:58

## 2022-07-08 RX ADMIN — Medication 2 UNIT(S): at 12:20

## 2022-07-08 RX ADMIN — MAGNESIUM OXIDE 400 MG ORAL TABLET 400 MILLIGRAM(S): 241.3 TABLET ORAL at 07:49

## 2022-07-08 RX ADMIN — CEFEPIME 100 MILLIGRAM(S): 1 INJECTION, POWDER, FOR SOLUTION INTRAMUSCULAR; INTRAVENOUS at 05:03

## 2022-07-08 RX ADMIN — MAGNESIUM OXIDE 400 MG ORAL TABLET 400 MILLIGRAM(S): 241.3 TABLET ORAL at 11:57

## 2022-07-08 RX ADMIN — MYCOPHENOLATE MOFETIL 1000 MILLIGRAM(S): 250 CAPSULE ORAL at 07:49

## 2022-07-08 RX ADMIN — APIXABAN 2.5 MILLIGRAM(S): 2.5 TABLET, FILM COATED ORAL at 05:03

## 2022-07-08 RX ADMIN — Medication 10 MILLIGRAM(S): at 14:08

## 2022-07-08 NOTE — PROGRESS NOTE ADULT - SUBJECTIVE AND OBJECTIVE BOX
Subjective: Patietn seen and examined resting in chair. Yesterday discharge was postpone due to elevated K.    Medications:  apixaban 2.5 milliGRAM(s) Oral every 12 hours  atovaquone  Suspension 1500 milliGRAM(s) Oral daily  cefepime   IVPB 1000 milliGRAM(s) IV Intermittent every 8 hours  chlorhexidine 2% Cloths 1 Application(s) Topical <User Schedule>  insulin glargine Injectable (LANTUS) 10 Unit(s) SubCutaneous at bedtime  insulin lispro (ADMELOG) corrective regimen sliding scale   SubCutaneous three times a day before meals  insulin lispro (ADMELOG) corrective regimen sliding scale   SubCutaneous at bedtime  insulin lispro Injectable (ADMELOG) 2 Unit(s) SubCutaneous three times a day before meals  magnesium oxide 400 milliGRAM(s) Oral three times a day with meals  multivitamin 1 Tablet(s) Oral daily  mycophenolate mofetil 1000 milliGRAM(s) Oral every 12 hours  NIFEdipine XL 60 milliGRAM(s) Oral daily  nystatin    Suspension 005637 Unit(s) Oral <User Schedule>  pantoprazole    Tablet 40 milliGRAM(s) Oral before breakfast  polyethylene glycol 3350 17 Gram(s) Oral daily  predniSONE   Tablet 12.5 milliGRAM(s) Oral every 12 hours  rosuvastatin 5 milliGRAM(s) Oral every 24 hours  sildenafil (REVATIO) 10 milliGRAM(s) Oral three times a day  sodium zirconium cyclosilicate 10 Gram(s) Oral every 48 hours  tacrolimus 2.5 milliGRAM(s) Oral <User Schedule>  valGANciclovir 450 milliGRAM(s) Oral every 12 hours  vancomycin    Solution 125 milliGRAM(s) Oral every 12 hours      Vitals:  Vital Signs Last 24 Hours  T(C): 36.8 (22 @ 03:05), Max: 36.9 (22 @ 14:42)  HR: 89 (22 @ 03:05) (84 - 94)  BP: 114/69 (22 @ 03:05) (108/59 - 138/72)  RR: 18 (22 @ 03:05) (18 - 18)  SpO2: 98% (22 @ 03:05) (96% - 98%)    Weight in k.5 ( @ 06:40)    I&O's Summary    2022 07:01  -  2022 07:00  --------------------------------------------------------  IN: 630 mL / OUT: 1500 mL / NET: -870 mL        Physical Exam  General: No distress. Comfortable.  Neck: Neck supple. JVP not elevated. No masses  Chest: Clear to auscultation bilaterally  CV: Normal S1 and S2. No murmurs, rub, or gallops. Radial pulses normal.  Abdomen: Soft, non-distended, non-tender  Neurology: Alert and oriented times three.       Labs:                        13.4   14.54 )-----------( 224      ( 2022 07:34 )             39.7     07-    x   |  x   |  x   ----------------------------<  x   4.7   |  x   |  x     Ca    9.2      2022 15:57  Phos  2.5     07-  Mg     2.0     -    TPro  6.7  /  Alb  4.2  /  TBili  0.8  /  DBili  x   /  AST  17  /  ALT  38  /  AlkPhos  74  07-                      Imaging Studies

## 2022-07-08 NOTE — CHART NOTE - NSCHARTNOTESELECT_GEN_ALL_CORE
Event Note
Heme/Onc
Nutrition Services
endocrine/Event Note
fever/Event Note
Hematology/Event Note
Heme/Onc
Nephrology/Event Note
Nutrition Services
Transfer note
accept note/Event Note

## 2022-07-08 NOTE — PROGRESS NOTE ADULT - ASSESSMENT
66M Mixed Ischemic/NICM Cardiomyopathy (EF 25-30% at baseline, s/p BIV-ICD), CAD (medically managed MIs in ,, Most recent stent in 2022 to mLAD) presented with multiple near syncope, found to have 41 episodes of VT and admitted initially to cardiac telemetry for further evaluation/management. Ischemic evaluation without new disease and VT ablation without substrate to complete ablation. Transferred from [facility] to [facility] for further management and evaluation for LVAD vs OHT. CT surgery consulted for LVAD/transplant evaluation.   Currently undergoing LVAD/transplant eval  Care per Heart failure and CCU primary team  Preop work up and diagnostics in progress  22 s/pOrthotopic heart transplant, ICD removal, iabp  Post op inotropes /pressors, nitric oxide  /Primacor/insulin  extubated d 1, hi flow   iabp d/c    nitric d/c   s/p biopsy, pw d/c,echo neg effusion  Htn-hydralazine, procardia added increased   JAGRUTI-stable, renal transplant following  hyponatremia, lasix/albumin  L sc thrombus,R cephalic thrombus on heparin sq---> plan to check LE duplex and if + may need full anticoagulation  Insulin infusion remains on  On vanco and cefepime for icd pocket + staph epi and morganella   primacor d/c   Transferred to sdu  As per renal lasix increased 40po bid  Chest tube # 3 d/c   VSS; insulin gttp weaned off overnight;  RSR 80-90; ?d/c med ct today- d/w Dr. Lombardi; LE sono neg DVT; tacro level today 10- continue tacro 4 po bid; vanco trough level 5.6- vanco 500 mg iv bid; change to po abx at home   Discharge planning- home ? fri; ck covid pcr; next biopsy scheduled  Call [facility] cardiology for anticoagulation recs. D/c hydralazine to maximize sildenefil. Hep sq.   D/c one  Ms ct today  Cont bid diuresis   Maintain  ct, if under 100cc can d/c   Hyperkalemia, diet restricted , no furrther intervention, discussed in rounds  Decrease lasix qd ./Ace wrap LE  7/3 VSS WBC up to 19,000 - Mg 1.7 - HF rounds made - plan: mg 2gm ivss x1, start mg ox 400mg po tid. bc x 2 & u/a for leukocytosis. d/c plan ? end of week -  RHC/biospy   VSS - will ck WBC this am - u/a negative - BC testing from yesterday d/t increase WBC- ck covid this am - NPO after midnight for biopsy in am- d/c planning   VSS wbc up to 20 - differential added to am CBC - s/w Mauro in lab - plan: RHC w/ biopsy this am.  CT chest/abd/pelvis per DR. Lombardi.  rpt b/l UE doppplers per Dr. Muse, [facility] cardiolgoy.  d/c plan home end of week ?Friday.  lasix d/c'd for now - will reassess after RHC results    WBC trending down.  CT C/A/P reviewed with Dr Lombardi, , no change in intervention.  < from: CT Chest No Cont (22 @ 17:10) >  Residual fibrous capsule in the regionof the previous cardiac device in   the left chest wall. No evidence of a discrete walled off collection on   this limited noncontrast study.    Interval heart transplant with small pericardial effusion/hemopericardium.    < end of copied text >    Likely transition to oral abx and d/c planning for tomorrow.  His wt is trending down, diuretics currently on hold.  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  Findings d/w Dr Gracia, rec eliquis 2.5 bid and  ASA 81   d/c planning with  oral antibiotics.  Lower lantus to 8 u at bedtime

## 2022-07-08 NOTE — PROGRESS NOTE ADULT - PROBLEM SELECTOR PROBLEM 2
CAD (coronary artery disease)
Chronic HFrEF (heart failure with reduced ejection fraction)
Immunosuppression
Immunosuppression
HTN (hypertension)
Controlled type 2 diabetes mellitus with hyperglycemia
HTN (hypertension)
Immunosuppression
CAD (coronary artery disease)
Chronic HFrEF (heart failure with reduced ejection fraction)
Chronic HFrEF (heart failure with reduced ejection fraction)
Immunosuppression
Immunosuppression
Chronic HFrEF (heart failure with reduced ejection fraction)
Immunosuppression
Immunosuppression
Chronic HFrEF (heart failure with reduced ejection fraction)
HTN (hypertension)
Immunosuppression
Immunosuppression
Chronic HFrEF (heart failure with reduced ejection fraction)
Immunosuppression
CAD (coronary artery disease)
Chronic HFrEF (heart failure with reduced ejection fraction)
Chronic HFrEF (heart failure with reduced ejection fraction)
CAD (coronary artery disease)
Immunosuppression
Immunosuppression
CAD (coronary artery disease)
Immunosuppression
CAD (coronary artery disease)
Chronic HFrEF (heart failure with reduced ejection fraction)
Immunosuppression
Chronic HFrEF (heart failure with reduced ejection fraction)

## 2022-07-08 NOTE — PROGRESS NOTE ADULT - SUBJECTIVE AND OBJECTIVE BOX
VITAL SIGNS    Telemetry:  nsr 80    Vital Signs Last 24 Hrs  T(C): 36.6 (22 @ 07:15), Max: 36.9 (22 @ 14:42)  T(F): 97.9 (22 @ 07:15), Max: 98.4 (22 @ 14:42)  HR: 102 (22 @ 07:15) (84 - 102)  BP: 125/79 (22 @ 07:15) (108/59 - 125/79)  RR: 18 (22 @ 07:15) (18 - 18)  SpO2: 98% (22 @ 07:15) (96% - 98%)                    @ 07:01  -   @ 07:00  --------------------------------------------------------  IN: 630 mL / OUT: 1500 mL / NET: -870 mL          Daily     Daily Weight in k.2 (2022 07:12)            CAPILLARY BLOOD GLUCOSE      POCT Blood Glucose.: 118 mg/dL (2022 07:46)  POCT Blood Glucose.: 155 mg/dL (2022 20:45)  POCT Blood Glucose.: 100 mg/dL (2022 17:32)  POCT Blood Glucose.: 204 mg/dL (2022 11:13)            Drains:       Pacing Wires           Coumadin    [ ] YES          [x  ]      NO                                   PHYSICAL EXAM        Neurology: alert and oriented x 3, nonfocal, no gross deficits  CV : s1 s2 RRR  Sternal Wound :  CDI , Stable  Lungs: cta  Abdomen: soft, nontender, nondistended, positive bowel sounds, last bowel movement                    :    voiding       Extremities:      -edema   /  -   calve tenderness ,             apixaban 2.5 milliGRAM(s) Oral every 12 hours  aspirin enteric coated 81 milliGRAM(s) Oral daily  atovaquone  Suspension 1500 milliGRAM(s) Oral daily  cefepime   IVPB 1000 milliGRAM(s) IV Intermittent every 8 hours  chlorhexidine 2% Cloths 1 Application(s) Topical <User Schedule>  insulin glargine Injectable (LANTUS) 10 Unit(s) SubCutaneous at bedtime  insulin lispro (ADMELOG) corrective regimen sliding scale   SubCutaneous three times a day before meals  insulin lispro (ADMELOG) corrective regimen sliding scale   SubCutaneous at bedtime  insulin lispro Injectable (ADMELOG) 2 Unit(s) SubCutaneous three times a day before meals  magnesium oxide 400 milliGRAM(s) Oral three times a day with meals  multivitamin 1 Tablet(s) Oral daily  mycophenolate mofetil 1000 milliGRAM(s) Oral every 12 hours  NIFEdipine XL 60 milliGRAM(s) Oral daily  nystatin    Suspension 650180 Unit(s) Oral <User Schedule>  pantoprazole    Tablet 40 milliGRAM(s) Oral before breakfast  polyethylene glycol 3350 17 Gram(s) Oral daily  predniSONE   Tablet 12.5 milliGRAM(s) Oral every 12 hours  rosuvastatin 5 milliGRAM(s) Oral every 24 hours  sildenafil (REVATIO) 10 milliGRAM(s) Oral three times a day  tacrolimus 2.5 milliGRAM(s) Oral <User Schedule>  valGANciclovir 450 milliGRAM(s) Oral every 12 hours  vancomycin    Solution 125 milliGRAM(s) Oral every 12 hours                    Physical Therapy Rec:   Home  [  ]   Home w/ PT  [  ]  Rehab  [  ]  Discussed with Cardiothoracic Team at AM rounds.

## 2022-07-08 NOTE — PROGRESS NOTE ADULT - PROBLEM SELECTOR PLAN 8
- Continue allopurinol at current dose  - s/p course of prednisone
- Continue prednisone and allopurinol at current dose
- Continue allopurinol at current dose  - s/p course of prednisone
- labs continue to show elevated K, will be started on Lokelma 10 PO every other day  - will need close monitoring with outpatient labs
HSQ
HSQ
- Continue allopurinol at current dose  - s/p course of prednisone
dvt ppx: hep gtt  Dispo tx to CICU
- Continue allopurinol at current dose  - s/p course of prednisone
- Continue allopurinol at current dose  - s/p course of prednisone
- Continue prednisone, colchicine, and allopurinol at current dose
- Continue allopurinol at current dose  - s/p course of prednisone
- Continue prednisone and allopurinol at current dose
HSQ
- Continue allopurinol at current dose  - s/p course of prednisone
- Continue prednisone, colchicine, and allopurinol at current dose
- labs continue to show elevated K, continue Lokelma 10 PO every other day  - will need close monitoring with outpatient labs
- Continue allopurinol at current dose  - s/p course of prednisone
HSQ
- Continue allopurinol at current dose  - s/p course of prednisone

## 2022-07-08 NOTE — PROGRESS NOTE ADULT - PROBLEM/PLAN-6
DISPLAY PLAN FREE TEXT
good balance
DISPLAY PLAN FREE TEXT

## 2022-07-08 NOTE — PROGRESS NOTE ADULT - PROBLEM SELECTOR PLAN 9
- hematology consulted for polycythemia workup  - EPO level elevated at 23.6 suggestive of secondary polycythemia. MELINA 2 pending, however per heme, concern for primary PCV is low.
- hematology consulted for polycythemia workup  - EPO level elevated at 23.6 suggestive of secondary polycythemia. No MELINA 2 mutation identified, therefore per heme, concern for primary PCV is low.
- hematology consulted for polycythemia workup  - EPO level elevated at 23.6 suggestive of secondary polycythemia. MELINA 2 pending, however per heme, concern for primary PCV is low.
- hematology consulted for polycythemia workup  - JAK2 w/ reflex, EPO level pending  - will need hematology to weigh in and determine if there are any contraindications for AC
- per Endo will be started on Metformin 500 mg PO BID and discharged home on Levemir 10 units at bedtime  - will monitor sugars closely as outpatient   - endo following and appreciate recommendations
- hematology consulted for polycythemia workup  - EPO level elevated at 23.6 suggestive of secondary polycythemia. MELINA 2 pending, however per heme, concern for primary PCV is low.
- hematology consulted for polycythemia workup  - EPO level elevated at 23.6 suggestive of secondary polycythemia. MELINA 2 pending, however per heme, concern for primary PCV is low.
- hematology consulted for polycythemia workup  - EPO level elevated at 23.6 suggestive of secondary polycythemia. No MELINA 2 mutation identified, therefore per heme, concern for primary PCV is low.
- hematology consulted for polycythemia workup  - EPO level elevated at 23.6 suggestive of secondary polycythemia. No MELINA 2 mutation identified, therefore per heme, concern for primary PCV is low.
- hematology consulted for polycythemia workup  - JAK2 w/ reflex, EPO level pending  - will need hematology to weigh in and determine if there are any contraindications for AC
- per Endo will be started on Metformin 500 mg PO BID and discharged home on Levemir 10 units at bedtime  - will monitor sugars closely as outpatient   - endo following and appreciate recommendations
- hematology consulted for polycythemia workup  - EPO level elevated at 23.6 suggestive of secondary polycythemia. MELINA 2 pending, however per heme, concern for primary PCV is low.
- hematology consulted for polycythemia workup  - EPO level elevated at 23.6 suggestive of secondary polycythemia. No MELINA 2 mutation identified, therefore per heme, concern for primary PCV is low.

## 2022-07-08 NOTE — PROGRESS NOTE ADULT - ATTENDING SUPERVISION STATEMENT
Fellow
Resident
Fellow
Resident
Resident
Fellow
Resident
Resident/Fellow
Resident
Fellow
Resident

## 2022-07-08 NOTE — PROGRESS NOTE ADULT - PROBLEM SELECTOR PROBLEM 3
Prophylactic antibiotic
S/P orthotopic heart transplant
Prophylactic antibiotic
S/P orthotopic heart transplant
HLD (hyperlipidemia)
Pulmonary hypertension
HLD (hyperlipidemia)
HTN (hypertension)
S/P orthotopic heart transplant
Prophylactic antibiotic
S/P orthotopic heart transplant
Prophylactic antibiotic
Pulmonary hypertension
Prophylactic antibiotic
Pulmonary hypertension
Pulmonary hypertension
S/P orthotopic heart transplant
Ventricular tachycardia
HLD (hyperlipidemia)
Prophylactic antibiotic
Pulmonary hypertension
S/P orthotopic heart transplant
S/P orthotopic heart transplant
Pulmonary hypertension
Prophylactic antibiotic
Pulmonary hypertension
S/P orthotopic heart transplant
S/P orthotopic heart transplant
Ventricular tachycardia
Prophylactic antibiotic
Prophylactic antibiotic
Pulmonary hypertension
Prophylactic antibiotic
Prophylactic antibiotic
Ventricular tachycardia
Prophylactic antibiotic
Pulmonary hypertension
Pulmonary hypertension
Ventricular tachycardia
Pulmonary hypertension
Ventricular tachycardia
Prophylactic antibiotic
Ventricular tachycardia
Prophylactic antibiotic
Pulmonary hypertension
Prophylactic antibiotic
Pulmonary hypertension

## 2022-07-08 NOTE — PROGRESS NOTE ADULT - PROBLEM SELECTOR PLAN 5
- Vascular Cardiology following  -  partially occlusive thrombus of L subclavian vein and superficial RUE DVT on VA duplex 6/28  - Va duplex 7/6 show no DVT, partially occlusive thrombus visualized in the medial segment of the left subclavian vein without change along with superficial thrombophlebitis right cephalic and basilic veins at the right forearm  - per Vascular continue Eliquis 2.5 mg PO BID

## 2022-07-08 NOTE — PROGRESS NOTE ADULT - PROBLEM SELECTOR PROBLEM 8
Gout
Gout
Prophylactic measure
Gout
Gout
Hyperkalemia
Gout
Prophylactic measure
Gout
Hyperkalemia
Gout
Prophylactic measure
Gout
Prophylactic measure
Gout
Prophylactic measure
Gout

## 2022-07-08 NOTE — PROGRESS NOTE ADULT - SUBJECTIVE AND OBJECTIVE BOX
[facility] DIVISION OF KIDNEY DISEASES AND HYPERTENSION -- FOLLOW UP NOTE  --------------------------------------------------------------------------------  HPI: 66M male with PMHx HTN, HLD, type 2 DM, chronic HFrEF,  (EF 25-30% by Echo) underwent OHT on 6/21/22. Nephrology following for JAGRUTI.     Upon review of labs, Scr was 1.2 on 4/19/22 and was elevated at 1.8 on 5/24/22. SCr downtrended to 1.01 on 6/20/22. Pt underwent OHT on 6/21/22 and required IABP post operatively. Scr uptrended to 1.23 on 6/23/22 with elevated PA pressures. SCr increased to 1.97. Pt currently on diuretics and non oliguric. UA done on 6/8/22 showed trace blood and proteinuria.  SCr stable/elevated at 1.4 today.    Pt. seen and examined today sitting comfortably in chair. Good UOP. SCr. 1.3 For discharge today. No complaints. Potassium improved today.       PAST HISTORY  --------------------------------------------------------------------------------  No significant changes to PMH, PSH, FHx, SHx, unless otherwise noted    ALLERGIES & MEDICATIONS  --------------------------------------------------------------------------------  Allergies    No Known Allergies    Intolerances      Standing Inpatient Medications  apixaban 2.5 milliGRAM(s) Oral every 12 hours  aspirin enteric coated 81 milliGRAM(s) Oral daily  atovaquone  Suspension 1500 milliGRAM(s) Oral daily  cefepime   IVPB 1000 milliGRAM(s) IV Intermittent every 8 hours  cefpodoxime 200 milliGRAM(s) Oral every 12 hours  chlorhexidine 2% Cloths 1 Application(s) Topical <User Schedule>  doxycycline monohydrate Capsule 100 milliGRAM(s) Oral every 12 hours  insulin glargine Injectable (LANTUS) 10 Unit(s) SubCutaneous at bedtime  insulin lispro (ADMELOG) corrective regimen sliding scale   SubCutaneous three times a day before meals  insulin lispro (ADMELOG) corrective regimen sliding scale   SubCutaneous at bedtime  insulin lispro Injectable (ADMELOG) 2 Unit(s) SubCutaneous three times a day before meals  magnesium oxide 400 milliGRAM(s) Oral three times a day with meals  multivitamin 1 Tablet(s) Oral daily  mycophenolate mofetil 1000 milliGRAM(s) Oral every 12 hours  NIFEdipine XL 60 milliGRAM(s) Oral daily  nystatin    Suspension 161274 Unit(s) Oral <User Schedule>  pantoprazole    Tablet 40 milliGRAM(s) Oral before breakfast  polyethylene glycol 3350 17 Gram(s) Oral daily  predniSONE   Tablet 12.5 milliGRAM(s) Oral every 12 hours  rosuvastatin 5 milliGRAM(s) Oral every 24 hours  sildenafil (REVATIO) 10 milliGRAM(s) Oral three times a day  sodium zirconium cyclosilicate 10 Gram(s) Oral every 24 hours  tacrolimus 2.5 milliGRAM(s) Oral <User Schedule>  valGANciclovir 450 milliGRAM(s) Oral every 12 hours    PRN Inpatient Medications      REVIEW OF SYSTEMS  --------------------------------------------------------------------------------  Gen: No fevers/chills  Skin: No rashes  Head/Eyes/Ears: Normal hearing,   Respiratory: No dyspnea, cough  CV: No chest pain  GI: No abdominal pain, diarrhea  : No dysuria, hematuria  MSK: No  edema  Heme: No easy bruising or bleeding  Psych: No significant depression      All other systems were reviewed and are negative, except as noted.    VITALS/PHYSICAL EXAM  --------------------------------------------------------------------------------  T(C): 36.5 (07-08-22 @ 11:08), Max: 36.9 (07-07-22 @ 19:09)  HR: 101 (07-08-22 @ 14:08) (84 - 102)  BP: 114/64 (07-08-22 @ 14:08) (105/61 - 125/79)  RR: 18 (07-08-22 @ 11:08) (18 - 18)  SpO2: 96% (07-08-22 @ 11:08) (96% - 98%)  Wt(kg): --        07-07-22 @ 07:01  -  07-08-22 @ 07:00  --------------------------------------------------------  IN: 630 mL / OUT: 1500 mL / NET: -870 mL    07-08-22 @ 07:01  -  07-08-22 @ 15:14  --------------------------------------------------------  IN: 540 mL / OUT: 300 mL / NET: 240 mL      Physical Exam:  	Gen: NAD  	HEENT: Anicteric  	Pulm: faint posterior crackles  	CV: S1S2+, midline sx scar, dressing +  	Abd: Soft, +BS    	Ext: LE edema B/L (trace)  	Neuro: Awake  	Skin: Warm and dry      LABS/STUDIES  --------------------------------------------------------------------------------              13.8   16.87 >-----------<  249      [07-08-22 @ 07:16]              41.6     133  |  96  |  40  ----------------------------<  135      [07-08-22 @ 07:15]  4.9   |  23  |  1.32        Ca     9.8     [07-08-22 @ 07:15]      Mg     1.8     [07-08-22 @ 07:15]      Phos  2.0     [07-08-22 @ 07:15]    TPro  6.7  /  Alb  4.2  /  TBili  0.8  /  DBili  x   /  AST  17  /  ALT  38  /  AlkPhos  74  [07-07-22 @ 15:57]          Creatinine Trend:  SCr 1.32 [07-08 @ 07:15]  SCr 1.32 [07-07 @ 15:57]  SCr 1.13 [07-07 @ 12:07]  SCr 1.18 [07-07 @ 07:34]  SCr 1.54 [07-06 @ 07:38]    Urinalysis - [07-03-22 @ 12:05]      Color Light Yellow / Appearance Clear / SG 1.012 / pH 6.5      Gluc Trace / Ketone Negative  / Bili Negative / Urobili Negative       Blood Negative / Protein Trace / Leuk Est Negative / Nitrite Negative      RBC 1 / WBC 0 / Hyaline 1 / Gran  / Sq Epi  / Non Sq Epi 0 / Bacteria Negative      Iron 26, TIBC 295, %sat 9      [05-27-22 @ 17:02]  Ferritin 217      [05-27-22 @ 17:02]  TSH 0.57      [06-22-22 @ 21:13]  Lipid: chol 159, , HDL 29, LDL --      [05-20-22 @ 14:17]

## 2022-07-08 NOTE — PROGRESS NOTE ADULT - PROBLEM SELECTOR PLAN 1
- s/p OHT with Dr. Earl/Maria C on 6/21 (ischemic time 261 min)  - IABP removed on POD 1  - off diuretics at this time, appears euvolemic on exam  - continue sildenafil 10 mg PO TID   - continue procardia 60 mg QD  - CAV ppx: continue ASA 81 mg PO QD and Rosuvastatin 5 mg qHS  - Will require labs to be completed as outpatient on Monday 7/11, will return for RHC/EMBx on 7/13

## 2022-07-08 NOTE — PROGRESS NOTE ADULT - PROBLEM SELECTOR PLAN 1
Pt. with hemodynamically mediated JAGRUTI after OHT, in the setting of renal venous congestion and medication induced (tacro, Vanco) Upon review of labs, Scr was 1.2 on 4/19/22 and was elevated at 1.8 on 5/24/22. SCr downtrended to 1.01 on 6/20/22. Pt underwent OHT on 6/21/22 and required IABP post operatively.  UA done on 6/8/22 showed trace blood and proteinuria. Scr uptrended to 1.23 on 6/23/22 with elevated PA pressures. SCr increased to 2.32. Pt also noted to have increased in tacro dose and vancomycin levels were on higher side. Vanco and diuretics were held on 6/27/22. SCr trended to 1.3 today. Now on lasix 40 mg Daily. Repeat UA unremarkable. Agre with Loklema Q48H as outpatient.     Pt is nonoliguric with 1.5L as documented. Continue diuretics and titrate to keep CVP < 10. Labs reviewed. No urgent indication for RRT. Monitor labs and urine output.    Pt. will need follow up with nephrology as outpatient. Can follow up with Dr. Jaramillo in 4 weeks after discharge from the [facility].    If you have any questions, please feel free to contact me  John Fofana  Nephrology Fellow  [phone]; Prefer [employer] TEAMS  (After 5pm or on weekends please page the on-call fellow)

## 2022-07-08 NOTE — PROGRESS NOTE ADULT - NS ATTEND AMEND GEN_ALL_CORE FT
65 y/o M with h/o HFrEF, admitted with VT and cardiogenic shock, now s/p OHT 6/21/2022 (CMV +/+, Toxo -/-).   RHC/biopsy yesterday with low filling pressures, no rejection. PA pressures improved from last week since starting Sildenafil.   Stopped diuretics and encouraging PO intake.   Continue Tacrolimus (goal trough 12-14), CellCept 1000mg BID, Prednisone taper. For prophylaxis, continue Atovaquone, Nystatin, Valcyte. Continue statin and ASA on discharge. Will need Apixaban upon discharge for RUE DVT. Continue Lokelma for persistent hyperkalemia.

## 2022-07-08 NOTE — PROGRESS NOTE ADULT - REASON FOR ADMISSION
LVAD/OHT eval
OHT & AICD removal
OHT & AICD removal
LVAD/OHT eval

## 2022-07-08 NOTE — PROGRESS NOTE ADULT - ATTENDING COMMENTS
Colon polyps  await pathology results
Liver enzymes, imaging, elastography consistent with low risk of significant liver fibrosis  Recommend proceeding with LVAD/heart transplant eval. No further liver work-up necessary
Plan for colonoscopy tomm AM
s/p OHT on 6/21  cont  lasix 40 po BID  increase procardia 60mg daily, stop hydralazine   S/p RHC 6/28  RA 9, PA: 62/27(41), PCWP: 21 with V wave 31, PA sat 69% Milana CO/CI: 6.87/3.62, SVR 1094 BP: 141/81(102), PVVR: 3.35  TTE with normal LV function, RV dilated    IS: tacrolimus 3mg BID, level 16  EMBx 6/28 1R/2, C4d negative,   daily trough levels  cont cellcept 1gm BID  steroid osmany     OI PPX: CMV +/+, Toxo -/-  cont valcyte , mepron   cont nystatin s/S  will start CAV ppx when stable    ICD pocket fluid cx + for morganella and staph epi  cont vanco and cefepime  follow levels    JAGRUTI  creat improved today  cont monitor    ambulate with PT  IS
Continue HSQ  On exam, the L radial seems occluded - please obtain dedicated L upper ARTERIAL duplex  Surveillance upper venous duplex    keep on HSQ for now  Await repeat duplex      Samia [phone]
Patholgy results from colonoscopy discussed with patient.   Plan for repeat in 3 years for surveillance.   GI to sign off, please call with questions.
Patient seen and examined with DR Morales    I agree with his interval history exam and plans as noted above    continues on IV Vancomycin and Cefepime for his ICD pocket cultures at time of explant  OI prophylaxis as noted above      Reggie Escalante MD  Can be called via Teams  After 5pm/weekends [phone]
Patient seen and examined with Dr Harris    I agree with his interval history exam and plans as noted above    Patient with RUE thrombophlebitis  blood cultures sent and pending  continue hot packs  continue Cefazolin  trend WBC  if blood cultures negative at 48hours no ID contra indications to transplant listing    Reggie Escalante MD  Can be called via Teams  After 5pm/weekends [phone]
Acute on chronic systolic heart failure.  Recent VT.  Severe pulmonary artery hypertension.    Patient remains inotrope dependent.  Plan for right heart cath and if necessary, IABP placement for hemodynamic support.    Ongoing consideration for advanced therapies per Heart Failure.  Case discussed with Vivian Nuñez MD PhD.
Felicita Jaramillo MD  Office : [phone]  Contact me on Microsoft Teams.
Felicita Jaramillo MD  Office : [phone]  Contact me on Microsoft Teams.
Felicita Jaramillo MD  Office : [phone]  Contact me on Microsoft Teams.
Felicita Jaramillo MD  Office : [phone]  Contact me on Microsoft Teams.
OHT  on 6/21  #jon- in setting of npo/higher tacro troughs  /diuresis  cr now stable  monitor cr trend  #heart tx, need for IS- on cellcept/tacro/pred- monitor tacro troughs  #vol status stable- holding diuresis per cv  #hyperkalemia- lokelma and medically treat; low k diet; monitor level
OHT  on 6/21  #jon- in setting of npo/higher tacro troughs  /diuresis  cr now stable  monitor cr trend  #heart tx, need for IS- on cellcept/tacro/pred- monitor tacro troughs  #vol status stable- holding diuresis per cv  #hyperkalemia- lokelma and medically treat; low k diet; monitor level
OHT  on 6/21  #jon- in setting of npo/higher tacro troughs today/diuresis  monitor cr trend  #heart tx, need for IS- on cellcept/tacro/pred- monitor tacro troughs  #vol status stable- holding diuresis per cv
Patient seen and examined    Case discussed with Dr Harris    I agree with his interval history exam and plans as noted above    Leukocytosis increasing  Afebrile  but forearm phlebitis seems a bit larger on exam today- would administer hot packs  send blood cultures  obtain dopplers of RUE  start Cefazolin  nasal MRSA swab    Reggie Escalante MD  Can be called via Teams  After 5pm/weekends [phone]
Felicita Jaramillo MD  Office : [phone]  Contact me on Microsoft Teams.
Felicita Jaramillo MD  Office : [phone]  Contact me on Microsoft Teams.
Patient seen and examined with DR Morales    I agree with his interval history exam and plans as noted above    Remains on Vancomycin plus Cefepime for positive ICD pocket cultures at time of removal    OI prophylaxis with mepron, nystatin, valcyte    Reggie Escalante MD  Can be called via Teams  After 5pm/weekends [phone]
Presented to [facility] with VT, found to be in cardiogenic shock and transferred to [facility]  A+Ox3  Listed for heart transplant  Cardiogenic shock, on Milrinone, Nipride and IABP   On max doses of Hydralazine/Isordil for afterload reduction - will maintain.    Continue Toprol and Amio for VT  S/p stent in 4/22, continue ASA but holding Plavix   O2 sats mid 90s on room air  DASH/diabetic diet  improved creatinine, encourage po fluid intake   H/H acceptable on Heparin drip for IABP  Sugars controlled  IABP 6/6
Presented to [facility] with VT, found to be in cardiogenic shock and transferred to [facility]  A+Ox3  Listed for heart transplant - accepted heart, for transplant later today  Cardiogenic shock, on Milrinone and IABP - maintain until transplant  On Nipride infusion and Hydralazine/Isordil for afterload reduction - will maintain  Continue Toprol for VT  S/p stent in 4/22, continue ASA but holding Plavix   O2 sats mid 90s on room air  DASH/diabetic diet  Normal renal function, compensated on exam, autodiuresing with slight hyponatremia - target even   H/H acceptable on Heparin drip for IABP  Afebrile, no antibiotics  Sugars controlled  IABP 6/6
Presented to [facility] with VT, found to be in cardiogenic shock and transferred to [facility]  A+Ox3  Listed for heart transplant  Cardiogenic shock, on Milrinone, Nipride and IABP   On max doses of Hydralazine/Isordil for afterload reduction - will maintain.    Continue Toprol and Amio for VT  S/p stent in 4/22, continue ASA but holding Plavix   O2 sats mid 90s on room air  DASH/diabetic diet  JAGRUTI resolved   H/H acceptable on Heparin drip for IABP  elevated LFTs, RUQ sono   Sugars controlled  IABP 6/6
Presented to [facility] with VT, found to be in cardiogenic shock and transferred to [facility]  A+Ox3  Listed for heart transplant  Cardiogenic shock, on Milrinone and IABP - maintain until transplant  On max doses of Hydralazine/Isordil for afterload reduction - will maintain.  Add nipride as d/w CHF team  Continue Toprol and Amio for VT  S/p stent in 4/22, continue ASA but holding Plavix   O2 sats mid 90s on room air  DASH/diabetic diet  JAGRUTI resolved   H/H acceptable on Heparin drip for IABP  elevated ALT, will dc lipitor   Sugars controlled  IABP 6/6
Presented to [facility] with VT, found to be in cardiogenic shock and transferred to [facility]  A+Ox3  Listed for heart transplant  Cardiogenic shock, on Milrinone and IABP - maintain until transplant  On Nipride infusion and Hydralazine/Isordil for afterload reduction - will maintain  Continue Toprol for VT  S/p stent in 4/22, continue ASA but holding Plavix   O2 sats mid 90s on room air  DASH/diabetic diet  Normal renal function, compensated on exam, autodiuresing - target even   H/H acceptable on Heparin drip for IABP  Afebrile, no antibiotics  Sugars controlled  IABP 6/6
Presented to [facility] with VT, found to be in cardiogenic shock and transferred to [facility]  A+Ox3  Listed for heart transplant  Cardiogenic shock, on Milrinone, Nipride and IABP   On max doses of Hydralazine/Isordil for afterload reduction - will maintain.    Continue Toprol and Amio for VT  S/p stent in 4/22, continue ASA but holding Plavix   O2 sats mid 90s on room air  DASH/diabetic diet  improved creatinine, encourage po fluid intake, hyponatremia, low urine osm.  ?SIADH or hypovolemia, cardiorenal c/s    H/H elevated from yesterday, will repeat. on Heparin drip for IABP  Sugars controlled  IABP 6/6
Presented to [facility] with VT, found to be in cardiogenic shock and transferred to [facility]  A+Ox3  Listed for heart transplant  Cardiogenic shock, on Milrinone, Nipride and IABP   On max doses of Hydralazine/Isordil for afterload reduction - will maintain.    Continue Toprol and Amio for VT  S/p stent in 4/22, continue ASA but holding Plavix   O2 sats mid 90s on room air  DASH/diabetic diet  mild creat bump, encourage po fluid intake   H/H acceptable on Heparin drip for IABP  Sugars controlled  IABP 6/6   check left hand xray as pt complaining of pain and tenderness
Hemodynamics greatly improved and cardiogenic shock is now INTERMACS 2->3. PA pressures remain high but PVR now < 3. Continue regimen as above. Continue LVAD/transplant evaluation.
Bedside nipride study today as above with ability to reduce PVR to < 3. Remove PAC given fevers and normal cardiac output. Continue milrinone at 0.5 and start hydralizine for afterload reduction. Continue adv therapies evaluation workup.
Renal function continues to improve. Increase hydralizine to 25 mg TID. Start prep today for virtual colonoscopy tomorrow (no anemia or FH of colon cancer). Continue LVAD/transplant workup and will present to committee this week.
Worsening JAGRUTI this morning. We sent for RHC which revealed severely elevated filling pressures, severe cpcPH, and CI of 1.9 on milrinone. IABP was placed and renal function has improved, PA pressures have improved to 40's/20's (unable to wedge at bedside) and CO has improved. He is MCS dependent and was inadequately supported with high dose inotropes. Will plan to list UNOS status 2e this evening for OHT. Augment vasodilators for afterload reduction.
Worsening JAGRUTI this morning. We sent for RHC which revealed severely elevated filling pressures, severe cpcPH, and CI of 1.9 on milrinone. IABP was placed and renal function has improved, PA pressures have improved to 40's/20's (unable to wedge at bedside) and CO has improved. He is MCS dependent and was inadequately supported with high dose inotropes. Will plan to list UNOS status 2e this evening for OHT. Augment vasodilators for afterload reduction.
67 y/o M with mixed ICM/NICM, admitted with cardiogenic shock and many episodes of VT. Underwent evaluation for advanced therapies. Found to have elevated pulmonary pressures but better on inotropes but he remained inadequately supported. IABP placed 6/6/2022 and was listed for heart transplant UNOS status 2E. Continue IABP and inotropes. Continue afterload reduction with Isordil/Hydralazine. SVR normalized with afterload reduction.   Fever overnight 6/7/22. RVP and UA negative. Blood cultures NGTD. [place] removed. Off antibiotics at this time.   UNOS status 7 until cultures resulted. Appreciate involvement of Transplant ID.

## 2022-07-08 NOTE — PROGRESS NOTE ADULT - ASSESSMENT
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 5/20 with near syncope in setting of worsening HF symptoms and found to have 41 episodes of VT, many terminating with ATP. LHC did not reveal new obstructive CAD and he underwent EPS which did not reveal endocardial substrate amenable for ablation. RHC revealed severely depressed cardiac output and he was transferred to [facility] 5/26 for advanced therapies evaluation.    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 He improved significantly with sequential uptitration of inotropes and increased pacing rate, but on 6/6 he had worsening JAGRUTI. He is s/p RHC which revealed severely elevated filling pressures, severe cpcPH, and CI of 1.9 on milrinone 0.5. IABP was placed and his renal function/PAPs have improved. He is MCS dependent and was inadequately supported with high dose inotropes, so was listed UNOS status 2e for OHT, awaiting suitable donor. However, was made status 7 as he became febrile, last 6/7 T 38. He has been afebrile since and blood cultures from 6/7 with NGTD x 48 hours and his leukocytosis has not worsened. Discussed with transplant ID and since bld cx negative x48hrs he has been re-listed UNOS status 2e.     A suitable donor was identified and patient underwent OHT with Dr. Earl/Maria C on 6/21 (ischemic time 261 mins, CMV +/+, Toxo -/-). Patient was successfully extubated and IABP was removed on POD 1. Off Maricruz. Cultures from his ICD site in the OR returned positive for staph epidermidis/ morganella morganii for which is being treated with IV Vancomycin and Cefepime. CT scan was completed residual fibrous capsule in the region of the previous cardiac device in  the left chest wall. No evidence of a discrete collection. His leukocytosis is remains elevated however he remains afebrile and will be discharge home on abx . His renal function continues to improve with holding diuretics. Plan for discharge home later today     RHC/EMBx  6/28: RA 9, RV 3/11, PA 62/27/41, wedge 21 with v wave 31, PA sat 69.3%, /81/102, CO/Ci 6.87/3.62, grade 1R/2  7/5: RA 2, RV 35/3, PA 36/18/26, Wedge 15, PA sat 71.7, Milana CO/CI 5.4/2.8, /75/93, SVR 1345 dsc, 1R/1A      Review of studies  TTE 5/21: LV 5.2 cm, LVEF 10-15%, LVOT VTI 10 cm, moderate RV dysfunction, mild AI, minimal MR  EKG: a-BiV paced  [facility] 5/23: patent mLAD stent with slow flow, D1 with 40-50% stenosis, mild disease otherwise  RHC 5/26: RA 12, PA 70/40 (50), PCWP 22, Milana CO/CI 2.3/1.3, MAP 83 with SVR 2469, PVR 12 PERSAUD

## 2022-07-08 NOTE — PROGRESS NOTE ADULT - NS_MD_PANP_GEN_ALL_CORE

## 2022-07-08 NOTE — PROGRESS NOTE ADULT - PROBLEM SELECTOR PLAN 4
- postop course c/b leukocytosis, though overall improving   - ICD pocket culture positive for staph epidermidis/ morganella morganii  - remains on Cefepime and IV Vancomycin. To complete 2-3 week course post ICD explant. Plan to switch Cefpodoxime and Doxy later today   - of note if WBC continue to remain elevated will need to consider bringing patient back to OR for ICD pocket extraction  - CT C/A/P 7/5 showed residual fibrous capsule in the region of the previous cardiac device in the left chest wall. No evidence of a discrete walled off collection on  this limited noncontrast study

## 2022-07-08 NOTE — PROGRESS NOTE ADULT - NS ATTEND OPT1 GEN_ALL_CORE

## 2022-07-08 NOTE — PROGRESS NOTE ADULT - NS ATTEND BILL GEN_ALL_CORE
Attending to bill

## 2022-07-08 NOTE — PROGRESS NOTE ADULT - PROBLEM SELECTOR PROBLEM 1
Controlled type 2 diabetes mellitus with hyperglycemia
JAGRUTI (acute kidney injury)
Cardiogenic shock
Controlled type 2 diabetes mellitus with hyperglycemia
Controlled type 2 diabetes mellitus with hyperglycemia
Steroid-induced hyperglycemia
Cardiogenic shock
JAGRUTI (acute kidney injury)
Cardiogenic shock
JAGRUTI (acute kidney injury)
S/P orthotopic heart transplant
Cardiogenic shock
JAGRUTI (acute kidney injury)
S/P orthotopic heart transplant
JAGRUTI (acute kidney injury)
S/P orthotopic heart transplant
Cardiogenic shock
JAGRUTI (acute kidney injury)
Cardiogenic shock
Cardiogenic shock
JAGRUTI (acute kidney injury)
S/P orthotopic heart transplant
Cardiogenic shock
JAGRUTI (acute kidney injury)
S/P orthotopic heart transplant
Acute on chronic HFrEF (heart failure with reduced ejection fraction)
JAGRUTI (acute kidney injury)
S/P orthotopic heart transplant
S/P orthotopic heart transplant
Cardiogenic shock
Cardiogenic shock
JAGRTUI (acute kidney injury)
S/P orthotopic heart transplant
S/P orthotopic heart transplant
Acute on chronic HFrEF (heart failure with reduced ejection fraction)
Acute on chronic HFrEF (heart failure with reduced ejection fraction)
Cardiogenic shock
JAGRUTI (acute kidney injury)
Cardiogenic shock
S/P orthotopic heart transplant
Acute on chronic HFrEF (heart failure with reduced ejection fraction)
Cardiogenic shock
Acute on chronic HFrEF (heart failure with reduced ejection fraction)
Cardiogenic shock
S/P orthotopic heart transplant
Cardiogenic shock
S/P orthotopic heart transplant
Acute on chronic HFrEF (heart failure with reduced ejection fraction)
Cardiogenic shock
S/P orthotopic heart transplant
S/P orthotopic heart transplant
Cardiogenic shock
S/P orthotopic heart transplant

## 2022-07-08 NOTE — CHART NOTE - NSCHARTNOTEFT_GEN_A_CORE
pt remained admitted for hyperkalemia, now for dc today, FBG remains tightly controlled, lower lantus to 8 units at discharge d/w Chacho NP     DC Planning:  egfr 60, LFT wnl, A1C 6.7 , discharging on pred 12.5mg bid  Discontinue glimperide  Send Rx for Lantus 8 units sq qhs    Send Rx for Metformin 500mg BID , can take with breakfast and dinner   If FBG  tomorrow morning, lower lantus dose to 6 units tomorrow night, if BG <70 call your doctors office for further adjustment. IF BG trending >200 during the day please call MD office to adjust regimen, f/u with MD if steroids are being tapered, will need adjustments to insulin regimen,    anticipate will be able to discontinue lantus in future after further steroid taper   send Rx  for diabetes supplies (glucometer, test strips, lancets, alcohol swabs, insulin pen needles). Routine outpatient opthalmology & podiatry evaluations recommended. Patient can follow up with endocrinology at the location provided below:   (emailed for appt awaiting appt details)   [facility]   [address] [place] [address]  [phone].  APPTS:  Anahi on 7/29/22 @ 1:15pm and, with Dr. Ferguson on 9/1/22 @ 1:40pm.    CAPILLARY BLOOD GLUCOSE      POCT Blood Glucose.: 118 mg/dL (08 Jul 2022 07:46)  POCT Blood Glucose.: 155 mg/dL (07 Jul 2022 20:45)  POCT Blood Glucose.: 100 mg/dL (07 Jul 2022 17:32)  POCT Blood Glucose.: 204 mg/dL (07 Jul 2022 11:13)

## 2022-07-11 LAB
ALBUMIN SERPL ELPH-MCNC: 4.4 G/DL
ALP BLD-CCNC: 98 U/L
ALT SERPL-CCNC: 31 U/L
ANION GAP SERPL CALC-SCNC: 12 MMOL/L
AST SERPL-CCNC: 21 U/L
BASOPHILS # BLD AUTO: 0.02 K/UL
BASOPHILS NFR BLD AUTO: 0.2 %
BILIRUB SERPL-MCNC: 0.9 MG/DL
BUN SERPL-MCNC: 40 MG/DL
CALCIUM SERPL-MCNC: 10 MG/DL
CHLORIDE SERPL-SCNC: 99 MMOL/L
CO2 SERPL-SCNC: 22 MMOL/L
CREAT SERPL-MCNC: 1.28 MG/DL
EGFR: 62 ML/MIN/1.73M2
EOSINOPHIL # BLD AUTO: 0.04 K/UL
EOSINOPHIL NFR BLD AUTO: 0.3 %
GLUCOSE SERPL-MCNC: 112 MG/DL
HCT VFR BLD CALC: 36.4 %
HGB BLD-MCNC: 12.3 G/DL
IMM GRANULOCYTES NFR BLD AUTO: 0.8 %
LYMPHOCYTES # BLD AUTO: 1.09 K/UL
LYMPHOCYTES NFR BLD AUTO: 8.2 %
MAGNESIUM SERPL-MCNC: 1.6 MG/DL
MAN DIFF?: NORMAL
MCHC RBC-ENTMCNC: 30.2 PG
MCHC RBC-ENTMCNC: 33.8 GM/DL
MCV RBC AUTO: 89.4 FL
MONOCYTES # BLD AUTO: 0.21 K/UL
MONOCYTES NFR BLD AUTO: 1.6 %
NEUTROPHILS # BLD AUTO: 11.85 K/UL
NEUTROPHILS NFR BLD AUTO: 88.9 %
PLATELET # BLD AUTO: 199 K/UL
POTASSIUM SERPL-SCNC: 5.1 MMOL/L
PROT SERPL-MCNC: 6.6 G/DL
RBC # BLD: 4.07 M/UL
RBC # FLD: 19.6 %
SODIUM SERPL-SCNC: 134 MMOL/L
WBC # FLD AUTO: 13.31 K/UL

## 2022-07-12 LAB — TACROLIMUS SERPL-MCNC: 18.6 NG/ML

## 2022-07-12 RX ORDER — TACROLIMUS 0.5 MG/1
0.5 CAPSULE ORAL
Qty: 60 | Refills: 5 | Status: DISCONTINUED | COMMUNITY
Start: 2022-06-29 | End: 2022-07-12

## 2022-07-13 ENCOUNTER — APPOINTMENT (OUTPATIENT)
Dept: HEART FAILURE | Facility: CLINIC | Age: 67
End: 2022-07-13

## 2022-07-13 ENCOUNTER — APPOINTMENT (OUTPATIENT)
Dept: CV DIAGNOSITCS | Facility: HOSPITAL | Age: 67
End: 2022-07-13

## 2022-07-13 ENCOUNTER — OUTPATIENT (OUTPATIENT)
Dept: OUTPATIENT SERVICES | Facility: HOSPITAL | Age: 67
LOS: 1 days | End: 2022-07-13
Payer: MEDICAID

## 2022-07-13 ENCOUNTER — TRANSCRIPTION ENCOUNTER (OUTPATIENT)
Age: 67
End: 2022-07-13

## 2022-07-13 ENCOUNTER — RESULT REVIEW (OUTPATIENT)
Age: 67
End: 2022-07-13

## 2022-07-13 VITALS
OXYGEN SATURATION: 98 % | DIASTOLIC BLOOD PRESSURE: 70 MMHG | RESPIRATION RATE: 15 BRPM | HEART RATE: 90 BPM | SYSTOLIC BLOOD PRESSURE: 138 MMHG

## 2022-07-13 VITALS
WEIGHT: 165 LBS | HEART RATE: 94 BPM | RESPIRATION RATE: 16 BRPM | TEMPERATURE: 98.4 F | DIASTOLIC BLOOD PRESSURE: 74 MMHG | BODY MASS INDEX: 24.44 KG/M2 | OXYGEN SATURATION: 97 % | HEIGHT: 69 IN | SYSTOLIC BLOOD PRESSURE: 136 MMHG

## 2022-07-13 VITALS
SYSTOLIC BLOOD PRESSURE: 136 MMHG | OXYGEN SATURATION: 97 % | HEART RATE: 93 BPM | RESPIRATION RATE: 17 BRPM | HEIGHT: 69 IN | TEMPERATURE: 98 F | WEIGHT: 164.91 LBS | DIASTOLIC BLOOD PRESSURE: 74 MMHG

## 2022-07-13 DIAGNOSIS — Z95.0 PRESENCE OF CARDIAC PACEMAKER: Chronic | ICD-10-CM

## 2022-07-13 DIAGNOSIS — Z95.810 PRESENCE OF AUTOMATIC (IMPLANTABLE) CARDIAC DEFIBRILLATOR: ICD-10-CM

## 2022-07-13 DIAGNOSIS — Z86.79 PERSONAL HISTORY OF OTHER DISEASES OF THE CIRCULATORY SYSTEM: ICD-10-CM

## 2022-07-13 DIAGNOSIS — Z98.890 OTHER SPECIFIED POSTPROCEDURAL STATES: Chronic | ICD-10-CM

## 2022-07-13 DIAGNOSIS — I51.9 HEART DISEASE, UNSPECIFIED: ICD-10-CM

## 2022-07-13 DIAGNOSIS — Z94.1 HEART TRANSPLANT STATUS: ICD-10-CM

## 2022-07-13 DIAGNOSIS — I44.30 UNSPECIFIED ATRIOVENTRICULAR BLOCK: ICD-10-CM

## 2022-07-13 LAB
ALBUMIN SERPL ELPH-MCNC: 4.8 G/DL — SIGNIFICANT CHANGE UP (ref 3.3–5)
ALP SERPL-CCNC: 119 U/L — SIGNIFICANT CHANGE UP (ref 40–120)
ALT FLD-CCNC: 32 U/L — SIGNIFICANT CHANGE UP (ref 10–45)
ANION GAP SERPL CALC-SCNC: 15 MMOL/L — SIGNIFICANT CHANGE UP (ref 5–17)
AST SERPL-CCNC: 23 U/L — SIGNIFICANT CHANGE UP (ref 10–40)
BASOPHILS # BLD AUTO: 0.01 K/UL — SIGNIFICANT CHANGE UP (ref 0–0.2)
BASOPHILS NFR BLD AUTO: 0.1 % — SIGNIFICANT CHANGE UP (ref 0–2)
BILIRUB SERPL-MCNC: 0.9 MG/DL — SIGNIFICANT CHANGE UP (ref 0.2–1.2)
BUN SERPL-MCNC: 42 MG/DL — HIGH (ref 7–23)
CALCIUM SERPL-MCNC: 10.1 MG/DL — SIGNIFICANT CHANGE UP (ref 8.4–10.5)
CHLORIDE SERPL-SCNC: 97 MMOL/L — SIGNIFICANT CHANGE UP (ref 96–108)
CO2 SERPL-SCNC: 23 MMOL/L — SIGNIFICANT CHANGE UP (ref 22–31)
CREAT SERPL-MCNC: 1.22 MG/DL — SIGNIFICANT CHANGE UP (ref 0.5–1.3)
EGFR: 66 ML/MIN/1.73M2 — SIGNIFICANT CHANGE UP
EOSINOPHIL # BLD AUTO: 0.03 K/UL — SIGNIFICANT CHANGE UP (ref 0–0.5)
EOSINOPHIL NFR BLD AUTO: 0.3 % — SIGNIFICANT CHANGE UP (ref 0–6)
GLUCOSE BLDC GLUCOMTR-MCNC: 109 MG/DL — HIGH (ref 70–99)
GLUCOSE SERPL-MCNC: 115 MG/DL — HIGH (ref 70–99)
HCT VFR BLD CALC: 38.3 % — LOW (ref 39–50)
HGB BLD-MCNC: 13 G/DL — SIGNIFICANT CHANGE UP (ref 13–17)
IMM GRANULOCYTES NFR BLD AUTO: 0.7 % — SIGNIFICANT CHANGE UP (ref 0–1.5)
LYMPHOCYTES # BLD AUTO: 1.04 K/UL — SIGNIFICANT CHANGE UP (ref 1–3.3)
LYMPHOCYTES # BLD AUTO: 8.8 % — LOW (ref 13–44)
MAGNESIUM SERPL-MCNC: 1.6 MG/DL — SIGNIFICANT CHANGE UP (ref 1.6–2.6)
MCHC RBC-ENTMCNC: 30 PG — SIGNIFICANT CHANGE UP (ref 27–34)
MCHC RBC-ENTMCNC: 33.9 GM/DL — SIGNIFICANT CHANGE UP (ref 32–36)
MCV RBC AUTO: 88.5 FL — SIGNIFICANT CHANGE UP (ref 80–100)
MONOCYTES # BLD AUTO: 0.15 K/UL — SIGNIFICANT CHANGE UP (ref 0–0.9)
MONOCYTES NFR BLD AUTO: 1.3 % — LOW (ref 2–14)
NEUTROPHILS # BLD AUTO: 10.57 K/UL — HIGH (ref 1.8–7.4)
NEUTROPHILS NFR BLD AUTO: 88.8 % — HIGH (ref 43–77)
NRBC # BLD: 0 /100 WBCS — SIGNIFICANT CHANGE UP (ref 0–0)
PLATELET # BLD AUTO: 222 K/UL — SIGNIFICANT CHANGE UP (ref 150–400)
POTASSIUM SERPL-MCNC: 5 MMOL/L — SIGNIFICANT CHANGE UP (ref 3.5–5.3)
POTASSIUM SERPL-SCNC: 5 MMOL/L — SIGNIFICANT CHANGE UP (ref 3.5–5.3)
PROT SERPL-MCNC: 7.6 G/DL — SIGNIFICANT CHANGE UP (ref 6–8.3)
RBC # BLD: 4.33 M/UL — SIGNIFICANT CHANGE UP (ref 4.2–5.8)
RBC # FLD: 19.3 % — HIGH (ref 10.3–14.5)
SODIUM SERPL-SCNC: 135 MMOL/L — SIGNIFICANT CHANGE UP (ref 135–145)
TACROLIMUS SERPL-MCNC: 13.6 NG/ML — SIGNIFICANT CHANGE UP
WBC # BLD: 11.88 K/UL — HIGH (ref 3.8–10.5)
WBC # FLD AUTO: 11.88 K/UL — HIGH (ref 3.8–10.5)

## 2022-07-13 PROCEDURE — C1889: CPT

## 2022-07-13 PROCEDURE — 93308 TTE F-UP OR LMTD: CPT

## 2022-07-13 PROCEDURE — 93308 TTE F-UP OR LMTD: CPT | Mod: 26

## 2022-07-13 PROCEDURE — 93321 DOPPLER ECHO F-UP/LMTD STD: CPT | Mod: 26

## 2022-07-13 PROCEDURE — 88307 TISSUE EXAM BY PATHOLOGIST: CPT | Mod: 26

## 2022-07-13 PROCEDURE — 93505 ENDOMYOCARDIAL BIOPSY: CPT

## 2022-07-13 PROCEDURE — 93010 ELECTROCARDIOGRAM REPORT: CPT

## 2022-07-13 PROCEDURE — C1894: CPT

## 2022-07-13 PROCEDURE — 93505 ENDOMYOCARDIAL BIOPSY: CPT | Mod: 26

## 2022-07-13 PROCEDURE — 82962 GLUCOSE BLOOD TEST: CPT

## 2022-07-13 PROCEDURE — C1769: CPT

## 2022-07-13 PROCEDURE — 80197 ASSAY OF TACROLIMUS: CPT

## 2022-07-13 PROCEDURE — 80053 COMPREHEN METABOLIC PANEL: CPT

## 2022-07-13 PROCEDURE — 99215 OFFICE O/P EST HI 40 MIN: CPT

## 2022-07-13 PROCEDURE — 93005 ELECTROCARDIOGRAM TRACING: CPT

## 2022-07-13 PROCEDURE — 36415 COLL VENOUS BLD VENIPUNCTURE: CPT

## 2022-07-13 PROCEDURE — 85025 COMPLETE CBC W/AUTO DIFF WBC: CPT

## 2022-07-13 PROCEDURE — 93321 DOPPLER ECHO F-UP/LMTD STD: CPT

## 2022-07-13 PROCEDURE — 88307 TISSUE EXAM BY PATHOLOGIST: CPT

## 2022-07-13 PROCEDURE — 88346 IMFLUOR 1ST 1ANTB STAIN PX: CPT | Mod: 26

## 2022-07-13 PROCEDURE — 88346 IMFLUOR 1ST 1ANTB STAIN PX: CPT

## 2022-07-13 PROCEDURE — 83735 ASSAY OF MAGNESIUM: CPT

## 2022-07-13 RX ORDER — MYCOPHENOLATE MOFETIL 250 MG/1
250 CAPSULE ORAL TWICE DAILY
Qty: 240 | Refills: 5 | Status: DISCONTINUED | COMMUNITY
Start: 2022-06-29 | End: 2022-07-13

## 2022-07-13 RX ORDER — FUROSEMIDE 40 MG/1
40 TABLET ORAL DAILY
Qty: 30 | Refills: 5 | Status: DISCONTINUED | COMMUNITY
Start: 2022-06-29 | End: 2022-07-13

## 2022-07-13 RX ORDER — TACROLIMUS 5 MG/1
1 CAPSULE ORAL
Qty: 0 | Refills: 0 | DISCHARGE

## 2022-07-13 NOTE — ASU PATIENT PROFILE, ADULT - FALL HARM RISK - UNIVERSAL INTERVENTIONS
Bed in lowest position, wheels locked, appropriate side rails in place/Call bell, personal items and telephone in reach/Instruct patient to call for assistance before getting out of bed or chair/Non-slip footwear when patient is out of bed/[place] to call system/Physically safe environment - no spills, clutter or unnecessary equipment/Purposeful Proactive Rounding/Room/bathroom lighting operational, light cord in reach

## 2022-07-13 NOTE — ASU DISCHARGE PLAN (ADULT/PEDIATRIC) - CARE PROVIDER_API CALL
Ariela Blevins (MD)  Adv Heart Fail Trnsplnt Cardio; Cardiovascular Disease; Internal Medicine  [address] [facility], [address] [place] [address]  Phone: [phone]  Fax: [phone]  Established Patient  Follow Up Time: Routine

## 2022-07-13 NOTE — ASU DISCHARGE PLAN (ADULT/PEDIATRIC) - NS MD DC FALL RISK RISK
For information on Fall & Injury Prevention, visit: [email or web address] OR  [email or web address] OR  https://www.cdc.gov/steadi/patient.html

## 2022-07-13 NOTE — H&P CARDIOLOGY - HISTORY OF PRESENT ILLNESS
66 years old M who presents for his first outpatient RHC with biopsy post OHT on  with Dr. Earl/Maria C on 6/21/22.  Denies any discomfort since discharge date, 7/7/22.    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 5/20 with near syncope in setting of worsening HF symptoms and found to have 41 episodes of VT, many terminating with ATP. LHC did not reveal new obstructive CAD and he underwent EPS which did not reveal endocardial substrate amenable for ablation. RHC revealed severely depressed cardiac output and he was transferred to [facility] 5/26 for advanced therapies evaluation.     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 He improved significantly with sequential uptitration of inotropes and increased pacing rate, but on 6/6 he had worsening JAGRUIT. He is s/p RHC which revealed severely elevated filling pressures, severe cpcPH, and CI of 1.9 on milrinone 0.5. IABP was placed and his renal function/PAPs have improved. He is MCS dependent and was inadequately supported with high dose inotropes, so was listed UNOS status 2e for OHT, awaiting suitable donor. However, was made status 7 as he became febrile, last 6/7 T 38. He has been afebrile since and blood cultures from 6/7 with NGTD x 48 hours and his leukocytosis has not worsened. Discussed with transplant ID and since bld cx negative x48hrs he has been re-listed UNOS status 2e.     A suitable donor was identified and patient underwent OHT with Dr. Baumann on 6/21 (ischemic time 261 mins, CMV +/+, Toxo -/-). Patient was successfully extubated and IABP was removed on POD 1. Off Maricruz. Cultures from his ICD site in the OR returned positive for staph epidermidis/ morganella morganii for which is being treated with IV Vancomycin and Cefepime. CT scan was completed residual fibrous capsule in the region of the previous cardiac device in  the left chest wall. No evidence of a discrete collection. His leukocytosis is remains elevated however he remains afebrile and will be discharge home on abx . His renal function continues to improve with holding diuretics. Patient discharged home on 7/7/2022.     RHC/EMBx  6/28: RA 9, RV 3/11, PA 62/27/41, wedge 21 with v wave 31, PA sat 69.3%, /81/102, CO/Ci 6.87/3.62, grade 1R/2  7/5: RA 2, RV 35/3, PA 36/18/26, Wedge 15, PA sat 71.7, Milana CO/CI 5.4/2.8, /75/93, SVR 1345 dsc, 1R/1A      Review of studies  TTE 5/21: LV 5.2 cm, LVEF 10-15%, LVOT VTI 10 cm, moderate RV dysfunction, mild AI, minimal MR  EKG: a-BiV paced  C 5/23: patent mLAD stent with slow flow, D1 with 40-50% stenosis, mild disease otherwise  RHC 5/26: RA 12, PA 70/40 (50), PCWP 22, Milana CO/CI 2.3/1.3, MAP 83 with SVR 2469, PVR 12 PERSAUD

## 2022-07-14 LAB
CMV DNA CSF QL NAA+PROBE: 86 — SIGNIFICANT CHANGE UP
CMV DNA SPEC NAA+PROBE-LOG#: 1.94 LOG10IU/ML — HIGH
SURGICAL PATHOLOGY STUDY: SIGNIFICANT CHANGE UP

## 2022-07-14 RX ORDER — PREDNISONE 5 MG/1
5 TABLET ORAL TWICE DAILY
Qty: 30 | Refills: 5 | Status: DISCONTINUED | COMMUNITY
Start: 2022-06-29 | End: 2022-07-14

## 2022-07-18 PROBLEM — Z86.79 HISTORY OF CORONARY ARTERY DISEASE: Status: RESOLVED | Noted: 2022-05-20 | Resolved: 2022-07-18

## 2022-07-18 PROBLEM — Z86.79 HISTORY OF PAROXYSMAL ATRIAL TACHYCARDIA: Status: RESOLVED | Noted: 2017-01-03 | Resolved: 2022-07-18

## 2022-07-18 PROBLEM — Z86.79 HISTORY OF VENTRICULAR TACHYCARDIA: Status: RESOLVED | Noted: 2022-05-20 | Resolved: 2022-07-18

## 2022-07-19 ENCOUNTER — FORM ENCOUNTER (OUTPATIENT)
Age: 67
End: 2022-07-19

## 2022-07-19 NOTE — REVIEW OF SYSTEMS
[Negative] : Psychiatric [FreeTextEntry2] : The remaining 14 point ROS is otherwise negative or non-contributory except as noted in the HPI

## 2022-07-20 ENCOUNTER — APPOINTMENT (OUTPATIENT)
Dept: HEART FAILURE | Facility: CLINIC | Age: 67
End: 2022-07-20

## 2022-07-20 ENCOUNTER — TRANSCRIPTION ENCOUNTER (OUTPATIENT)
Age: 67
End: 2022-07-20

## 2022-07-20 ENCOUNTER — APPOINTMENT (OUTPATIENT)
Dept: CV DIAGNOSITCS | Facility: HOSPITAL | Age: 67
End: 2022-07-20

## 2022-07-20 ENCOUNTER — OUTPATIENT (OUTPATIENT)
Dept: OUTPATIENT SERVICES | Facility: HOSPITAL | Age: 67
LOS: 1 days | End: 2022-07-20
Payer: MEDICAID

## 2022-07-20 ENCOUNTER — RESULT REVIEW (OUTPATIENT)
Age: 67
End: 2022-07-20

## 2022-07-20 VITALS
HEART RATE: 90 BPM | RESPIRATION RATE: 18 BRPM | DIASTOLIC BLOOD PRESSURE: 74 MMHG | OXYGEN SATURATION: 99 % | SYSTOLIC BLOOD PRESSURE: 132 MMHG

## 2022-07-20 VITALS
SYSTOLIC BLOOD PRESSURE: 132 MMHG | HEART RATE: 100 BPM | TEMPERATURE: 98.3 F | BODY MASS INDEX: 21.62 KG/M2 | OXYGEN SATURATION: 99 % | DIASTOLIC BLOOD PRESSURE: 78 MMHG | WEIGHT: 146 LBS | RESPIRATION RATE: 16 BRPM | HEIGHT: 69 IN

## 2022-07-20 VITALS
HEIGHT: 70 IN | SYSTOLIC BLOOD PRESSURE: 132 MMHG | TEMPERATURE: 98 F | RESPIRATION RATE: 16 BRPM | OXYGEN SATURATION: 100 % | WEIGHT: 145.06 LBS | HEART RATE: 100 BPM | DIASTOLIC BLOOD PRESSURE: 78 MMHG

## 2022-07-20 DIAGNOSIS — Z94.1 HEART TRANSPLANT STATUS: ICD-10-CM

## 2022-07-20 DIAGNOSIS — Z98.890 OTHER SPECIFIED POSTPROCEDURAL STATES: Chronic | ICD-10-CM

## 2022-07-20 DIAGNOSIS — Z95.0 PRESENCE OF CARDIAC PACEMAKER: Chronic | ICD-10-CM

## 2022-07-20 LAB
ALBUMIN SERPL ELPH-MCNC: 4.3 G/DL — SIGNIFICANT CHANGE UP (ref 3.3–5)
ALP SERPL-CCNC: 99 U/L — SIGNIFICANT CHANGE UP (ref 40–120)
ALT FLD-CCNC: 23 U/L — SIGNIFICANT CHANGE UP (ref 10–45)
ANION GAP SERPL CALC-SCNC: 13 MMOL/L — SIGNIFICANT CHANGE UP (ref 5–17)
AST SERPL-CCNC: 18 U/L — SIGNIFICANT CHANGE UP (ref 10–40)
BASOPHILS # BLD AUTO: 0 K/UL — SIGNIFICANT CHANGE UP (ref 0–0.2)
BASOPHILS NFR BLD AUTO: 0 % — SIGNIFICANT CHANGE UP (ref 0–2)
BILIRUB SERPL-MCNC: 0.7 MG/DL — SIGNIFICANT CHANGE UP (ref 0.2–1.2)
BUN SERPL-MCNC: 30 MG/DL — HIGH (ref 7–23)
CALCIUM SERPL-MCNC: 9.4 MG/DL — SIGNIFICANT CHANGE UP (ref 8.4–10.5)
CHLORIDE SERPL-SCNC: 101 MMOL/L — SIGNIFICANT CHANGE UP (ref 96–108)
CO2 SERPL-SCNC: 21 MMOL/L — LOW (ref 22–31)
CREAT SERPL-MCNC: 0.99 MG/DL — SIGNIFICANT CHANGE UP (ref 0.5–1.3)
CULTURE RESULTS: SIGNIFICANT CHANGE UP
EGFR: 85 ML/MIN/1.73M2 — SIGNIFICANT CHANGE UP
EOSINOPHIL # BLD AUTO: 0.02 K/UL — SIGNIFICANT CHANGE UP (ref 0–0.5)
EOSINOPHIL NFR BLD AUTO: 0.3 % — SIGNIFICANT CHANGE UP (ref 0–6)
GLUCOSE SERPL-MCNC: 109 MG/DL — HIGH (ref 70–99)
HBV SURFACE AB SER-ACNC: SIGNIFICANT CHANGE UP
HBV SURFACE AG SER-ACNC: SIGNIFICANT CHANGE UP
HCT VFR BLD CALC: 35.6 % — LOW (ref 39–50)
HCV AB S/CO SERPL IA: 0.08 S/CO — SIGNIFICANT CHANGE UP (ref 0–0.99)
HCV AB SERPL-IMP: SIGNIFICANT CHANGE UP
HCV RNA FLD QL NAA+PROBE: SIGNIFICANT CHANGE UP
HCV RNA SPEC QL PROBE+SIG AMP: SIGNIFICANT CHANGE UP
HGB BLD-MCNC: 12.1 G/DL — LOW (ref 13–17)
IMM GRANULOCYTES NFR BLD AUTO: 1.1 % — SIGNIFICANT CHANGE UP (ref 0–1.5)
LYMPHOCYTES # BLD AUTO: 0.89 K/UL — LOW (ref 1–3.3)
LYMPHOCYTES # BLD AUTO: 12.4 % — LOW (ref 13–44)
MAGNESIUM SERPL-MCNC: 1.6 MG/DL — SIGNIFICANT CHANGE UP (ref 1.6–2.6)
MCHC RBC-ENTMCNC: 30.8 PG — SIGNIFICANT CHANGE UP (ref 27–34)
MCHC RBC-ENTMCNC: 34 GM/DL — SIGNIFICANT CHANGE UP (ref 32–36)
MCV RBC AUTO: 90.6 FL — SIGNIFICANT CHANGE UP (ref 80–100)
MONOCYTES # BLD AUTO: 0.16 K/UL — SIGNIFICANT CHANGE UP (ref 0–0.9)
MONOCYTES NFR BLD AUTO: 2.2 % — SIGNIFICANT CHANGE UP (ref 2–14)
NEUTROPHILS # BLD AUTO: 6.04 K/UL — SIGNIFICANT CHANGE UP (ref 1.8–7.4)
NEUTROPHILS NFR BLD AUTO: 84 % — HIGH (ref 43–77)
NRBC # BLD: 0 /100 WBCS — SIGNIFICANT CHANGE UP (ref 0–0)
PLATELET # BLD AUTO: 277 K/UL — SIGNIFICANT CHANGE UP (ref 150–400)
POTASSIUM SERPL-MCNC: 4.5 MMOL/L — SIGNIFICANT CHANGE UP (ref 3.5–5.3)
POTASSIUM SERPL-SCNC: 4.5 MMOL/L — SIGNIFICANT CHANGE UP (ref 3.5–5.3)
PROT SERPL-MCNC: 6.6 G/DL — SIGNIFICANT CHANGE UP (ref 6–8.3)
RBC # BLD: 3.93 M/UL — LOW (ref 4.2–5.8)
RBC # FLD: 19.3 % — HIGH (ref 10.3–14.5)
SODIUM SERPL-SCNC: 135 MMOL/L — SIGNIFICANT CHANGE UP (ref 135–145)
SPECIMEN SOURCE: SIGNIFICANT CHANGE UP
TACROLIMUS SERPL-MCNC: 13.8 NG/ML — SIGNIFICANT CHANGE UP
WBC # BLD: 7.19 K/UL — SIGNIFICANT CHANGE UP (ref 3.8–10.5)
WBC # FLD AUTO: 7.19 K/UL — SIGNIFICANT CHANGE UP (ref 3.8–10.5)

## 2022-07-20 PROCEDURE — 99152 MOD SED SAME PHYS/QHP 5/>YRS: CPT

## 2022-07-20 PROCEDURE — 88346 IMFLUOR 1ST 1ANTB STAIN PX: CPT | Mod: 26

## 2022-07-20 PROCEDURE — 87517 HEPATITIS B DNA QUANT: CPT

## 2022-07-20 PROCEDURE — 88346 IMFLUOR 1ST 1ANTB STAIN PX: CPT

## 2022-07-20 PROCEDURE — 88307 TISSUE EXAM BY PATHOLOGIST: CPT

## 2022-07-20 PROCEDURE — C1894: CPT

## 2022-07-20 PROCEDURE — C1889: CPT

## 2022-07-20 PROCEDURE — 93308 TTE F-UP OR LMTD: CPT

## 2022-07-20 PROCEDURE — 83735 ASSAY OF MAGNESIUM: CPT

## 2022-07-20 PROCEDURE — 93005 ELECTROCARDIOGRAM TRACING: CPT

## 2022-07-20 PROCEDURE — 93308 TTE F-UP OR LMTD: CPT | Mod: 26

## 2022-07-20 PROCEDURE — 80053 COMPREHEN METABOLIC PANEL: CPT

## 2022-07-20 PROCEDURE — 93321 DOPPLER ECHO F-UP/LMTD STD: CPT | Mod: 26

## 2022-07-20 PROCEDURE — C1769: CPT

## 2022-07-20 PROCEDURE — 99215 OFFICE O/P EST HI 40 MIN: CPT

## 2022-07-20 PROCEDURE — 88307 TISSUE EXAM BY PATHOLOGIST: CPT | Mod: 26

## 2022-07-20 PROCEDURE — 86803 HEPATITIS C AB TEST: CPT

## 2022-07-20 PROCEDURE — 93505 ENDOMYOCARDIAL BIOPSY: CPT | Mod: 26

## 2022-07-20 PROCEDURE — 93321 DOPPLER ECHO F-UP/LMTD STD: CPT

## 2022-07-20 PROCEDURE — 80197 ASSAY OF TACROLIMUS: CPT

## 2022-07-20 PROCEDURE — 85025 COMPLETE CBC W/AUTO DIFF WBC: CPT

## 2022-07-20 PROCEDURE — 87340 HEPATITIS B SURFACE AG IA: CPT

## 2022-07-20 PROCEDURE — 87521 HEPATITIS C PROBE&RVRS TRNSC: CPT

## 2022-07-20 PROCEDURE — 93505 ENDOMYOCARDIAL BIOPSY: CPT

## 2022-07-20 PROCEDURE — 93010 ELECTROCARDIOGRAM REPORT: CPT

## 2022-07-20 PROCEDURE — 86706 HEP B SURFACE ANTIBODY: CPT

## 2022-07-20 RX ORDER — SENNOSIDES 8.6 MG/1
8.6 CAPSULE, GELATIN COATED ORAL
Qty: 60 | Refills: 5 | Status: DISCONTINUED | COMMUNITY
Start: 2022-06-29 | End: 2022-07-20

## 2022-07-20 RX ORDER — CEFPODOXIME PROXETIL 100 MG
1 TABLET ORAL
Qty: 0 | Refills: 0 | DISCHARGE

## 2022-07-20 RX ORDER — DOXYCYCLINE HYCLATE 100 MG/1
100 CAPSULE ORAL
Qty: 20 | Refills: 1 | Status: DISCONTINUED | COMMUNITY
Start: 2022-06-30 | End: 2022-07-14

## 2022-07-20 RX ORDER — CEFPODOXIME PROXETIL 200 MG/1
200 TABLET, FILM COATED ORAL TWICE DAILY
Qty: 40 | Refills: 0 | Status: DISCONTINUED | COMMUNITY
Start: 2022-06-30 | End: 2022-07-15

## 2022-07-20 RX ORDER — DOCUSATE SODIUM 100 MG/1
100 CAPSULE ORAL 3 TIMES DAILY
Qty: 90 | Refills: 5 | Status: DISCONTINUED | COMMUNITY
Start: 2022-06-29 | End: 2022-07-20

## 2022-07-20 NOTE — ASU DISCHARGE PLAN (ADULT/PEDIATRIC) - CARE PROVIDER_API CALL
Vivian Nuñez (MD; PhD)  Adv Heart Fail Trnsplnt Cardio; Cardiovascular Disease; Internal Medicine  [address] [place] [address]  Phone: [phone]  Fax: [phone]  Follow Up Time:

## 2022-07-20 NOTE — ASU DISCHARGE PLAN (ADULT/PEDIATRIC) - NS MD DC FALL RISK RISK
For information on Fall & Injury Prevention, visit: [email or web address] OR  [email or web address] OR  https://www.cdc.gov/steadi/patient.html

## 2022-07-20 NOTE — H&P CARDIOLOGY - HISTORY OF PRESENT ILLNESS
H and P as per 7/13/22 note  Medications reconciled  Allergies reviewed  PE unchanges since 7/13/22    Pt presents for RHC with biopsy

## 2022-07-20 NOTE — ASU PATIENT PROFILE, ADULT - AS [facility] [employer]
Telephone Encounter by Mckenzie Shultz 4) no limitation

## 2022-07-21 LAB
CMV DNA CSF QL NAA+PROBE: SIGNIFICANT CHANGE UP
CMV DNA SPEC NAA+PROBE-LOG#: SIGNIFICANT CHANGE UP LOG10IU/ML
HBV DNA # SERPL NAA+PROBE: SIGNIFICANT CHANGE UP
HBV DNA SERPL NAA+PROBE-LOG#: SIGNIFICANT CHANGE UP LOGIU/ML
SURGICAL PATHOLOGY STUDY: SIGNIFICANT CHANGE UP

## 2022-07-22 NOTE — HISTORY OF PRESENT ILLNESS
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 LHC did not reveal new obstructive CAD and he underwent EPS which did not reveal endocardial substrate amenable for ablation. RHC revealed severely depressed cardiac output and he was transferred to [facility] 5/26 for advanced therapies evaluation. IABP was placed and he was listed UNOS status 2e for OHT. He was briefly made status 7 as he became febrile without leukocytosis, then bld cx negative x48hrs he was re-activated UNOS status 2e. \par \par A suitable donor was identified and patient underwent OHT with Dr. Earl/Maria C on 6/21/22 (ischemic time 261 mins, CMV +/+, Toxo -/-). Patient was successfully extubated and IABP was removed on POD 1.  Cultures from the ICD site in the OR were positive for staph epidermidis/morganella morganii and he was treated with IV Vancomycin and Cefepime. CT scan demonstrated residual fibrous capsule in the region of the previous cardiac device in the left chest wall. Pt was then discharged home on oral antibiotics on 7/8/22.\par \par Pt is here today for f/u visit and biopsy #3. Since being home, he reports feeling well and very thankful to the team. He reports he is ambulating around the home without difficulty, but has not taken a walk outside yet. He denies CP, SOB at rest, HSIEH, orthopnea, PND, LH/dizziness, abdominal discomfort, palpitations, and syncope. No headache or tremor. No fevers or chills. His appetite is normal and his bowel and bladder habits are normal, specifically without diarrhea. Home vital signs logs reviewed and average 's/80's, FS 98-224mg/dL.

## 2022-07-22 NOTE — CARDIOLOGY SUMMARY
[de-identified] : \par 7/13/22 ECG: NSR at 94 bpm, RBBB. [de-identified] : \par 07/05/22 TTE: limited study: normal biV function, small loculated pericardial effusion at the LV apex, measuring approx 1.0 cm seen both pre and post images. [de-identified] : \par 6/28: RA 9, RV 3/11, PA 62/27/41, wedge 21 with v wave 31, PA sat 69.3%, /81/102, CO/Ci 6.87/3.62, grade 1R/2\par 7/5: RA 2, RV 35/3, PA 36/18/26, Wedge 15, PA sat 71.7, Kristy CO/CI 5.4/2.8, /75/93, SVR 1345 dsc, 1R/1A\par 7/13: RA 3, PAP 36/14/24 PCW 8, Pa sat 71%, Kristy CO/CI 6.1/3.2, EMBx ISHLT Grade 1R/1A

## 2022-07-22 NOTE — DISCUSSION/SUMMARY
[___ Week(s)] : in [unfilled] week(s) [EKG obtained to assist in diagnosis and management of assessed problem(s)] : EKG obtained to assist in diagnosis and management of assessed problem(s) [Patient] : the patient [FreeTextEntry2] : and his wife [FreeTextEntry1] : 65 YO M with a history of ACC/AHA Stage D mixed NICM/ICM (likely familial with strong FH and early arrhythmia history in his 30's) with LVED 5.2 cm and LVEF 10-15% s/p PPM upgraded to CRT-D, CAD s/p PCI to mLAD 4/2022, well controlled DM2 (A1c 6.2%), and CKD III (Cr 1.4) and VT who is now s/p OHT on 6/21/22 (ischemic time 261 mins, CMV +/+, Toxo -/-), doing clinically well.\par \par #s/p OHT\par - continue ASA 81mg daily and rosuvastatin 5mg daily for prevention TCAD\par - RHC showed normal hemodynamics and EMBx resulted ISHLT Grade 1R/1A\par - continue sildenafil 10mg TID\par - limited TTE findings showed small pericardial effusion at the RV apex; repeat full TTE next week\par - check allosure\par \par #Immunosuppression\par - continue prograf for goal trough 12-14. Continue current dose of 2mg bid\par - continue cellcept 1000mg bid\par - taper prednisone per protocol\par \par #prophylaxis\par - CMV +/+: continue valcyte 450mg bid. Last CMV PCR 1.9 (7/3/22), CMV PCR today = 1.94. Repeat next week.\par - Toxo -/-: no ppx needed\par - PJP: continue mepron 1500mg daily (bactrim was not trialed due to hyperkalemia and borderline elevated SCr)\par - thrush: continue nystatin S&S qid until prednisone <10mg daily\par \par #ICD pocket +staph epidermiditis/morganella morganii\par - leukocytosis improving 16-->13-->11.8 (today)\par - continue Cefpodoxime through 7/15\par - continue Doxyclycine through 7/21\par - per discharge plans, if WBC continue to remain elevated will need to consider bringing patient back to OR for ICD pocket extraction\par - CT C/A/P 7/5 showed residual fibrous capsule in the region of the previous cardiac device in the left chest wall. 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 Jaramillo in 4 weeks post discharge\par - avoid nephrotoxic agents\par - remain off diuretics\par \par #HTN\par - continue hydralazine 25mg TID\par - continue nifedipine 60mg daily\par \par #hyperglycemia\par - seen by endocrine and started on metformin 500mg bid and levemir 10units qhs \par - needs f/u endocrine appt\par - continue to monitor FS \par \par #Health maintenance\par - continue mag oxide and MVI\par - continue calcium citrate + vitamin D\par - continue PPI\par \par RTC/bx one week

## 2022-07-22 NOTE — PHYSICAL EXAM
[No Xanthelasma] : no xanthelasma [Normal Venous Pressure] : normal venous pressure [Normal] : alert and oriented, normal memory [de-identified] : not assessed - wearing a mask [de-identified] : JVP 6 cm H2O, no HJR [de-identified] : +midsternal chest wound well healed, left upper chest prior AICD site c/d/i

## 2022-07-22 NOTE — END OF VISIT
[FreeTextEntry3] : I, Dr. Nuñez, personally performed the evaluation and management (E/M) services for this established patient who presents today with (a) new problem(s)/exacerbation of (an) existing condition(s).  That E/M includes conducting the examination, assessing all new/exacerbated conditions, and establishing a new plan of care.  Today, my REBECA, Faveous, was here to observe my evaluation and management services for this new problem/exacerbated condition to be followed going forward.  [Time Spent: ___ minutes] : I have spent [unfilled] minutes of time on the encounter.

## 2022-07-26 NOTE — REVIEW OF SYSTEMS
[FreeTextEntry2] : The remaining 14 point ROS is otherwise negative or non-contributory except as noted in the HPI

## 2022-07-29 ENCOUNTER — APPOINTMENT (OUTPATIENT)
Dept: ENDOCRINOLOGY | Facility: CLINIC | Age: 67
End: 2022-07-29

## 2022-07-29 VITALS
SYSTOLIC BLOOD PRESSURE: 100 MMHG | HEIGHT: 69 IN | HEART RATE: 110 BPM | OXYGEN SATURATION: 96 % | TEMPERATURE: 97.6 F | BODY MASS INDEX: 22.22 KG/M2 | DIASTOLIC BLOOD PRESSURE: 62 MMHG | WEIGHT: 150 LBS

## 2022-07-29 PROCEDURE — 99205 OFFICE O/P NEW HI 60 MIN: CPT

## 2022-07-29 NOTE — HISTORY OF PRESENT ILLNESS
[FreeTextEntry1] : 66M male with PMHx HTN, HLD, type 2 DM, chronic HFrEF, (EF 25-30% by Echo) who underwent OHT on 6/21/22, currently on prednisone. Endocrinology consulted for assistance with management of steroid induced hyperglycemia/dm2.\par \par Controlled type 2 diabetes mellitus with hyperglycemia.\par Diagnosis: Type 2DM, diagnosed initially 5/2022. \par \par Previous Medications\par Glimepiride 1 mg was not on steroids at that time. Diagnosed with diabetes 5/2022. \par \par Current Medications:\par Metformin 500 mg BID\par Levemir 8 units QHS\par \par Meter Readings:\par See scanned logs.\par AM \par Pre lunch \par pre-dinner 112-160\par bedtime: 112-170, one outlier 200 due to high carb meal\par Readings dependent on meal size. \par \par Current Steroid Regimen: Based on biopsy results\par -Prednisone tapered to 12.5 mg BID on 7/6 with BG at goal since dinner\par Reduced to 10 mg BID on 7/13\par Reduced to 7.5 mg BID on 7/21\par \par Current Immunosuppressive Regimen:\par Tacrolimus 2 mg twice daily. \par \par Immunosuppression\par - prograf for goal trough 12-14.  2mg bid\par -cellcept 1000mg bid\par - prednisone taper\par \par Labs in hospital:\par egfr 60, LFT wnl, A1C 6.7 \par \par DIET:\par breakfast: toast/egg/fruit\par Lunch: rice/pasta with meat or sandwich/soup\par Dinner: low carb options as above, low sodium\par \par Exercise:\par Has therapist, walks outside, for 15 minutes or so at a time. \par \par  HTN \par - outpt BP goal < 130/80\par -on hydralazine and nifedipine\par - outpatient annual urinary microalb/cr ratio.-- states already checked. \par \par : HLD (hyperlipidemia).\par - LDL goal < 70\par - defer to primary team\par - outpatient fasting lipid profile\par -Rosuvastatin 5mg daily -- on with cardiology\par \par labs 7/20/22:\par GFR 85-- at time of biospy, doing blood work weekly. \par \par optho: had dilated eye exam 3/2021-- normal, other than reading glasses prescription, some blocked tear ducts. \par

## 2022-07-29 NOTE — ASSESSMENT
[FreeTextEntry1] : 66M male with PMHx HTN, HLD, type 2 DM, chronic HFrEF, (EF 25-30% by Echo) who underwent OHT on 6/21/22, currently on tacrolimus 2mg BID and prednisone 7.5mg BID, presents for initial patient consult s/p transplant for steroid/transplant induced diabetes mellitus. \par \par \par 1. Steroid/transplant induced diabetes mellitus.\par A1c 6.7%\par Levemir reduce to 6 units QHS\par Metformin ER 500mg increase to 2 tablets BID and switch to ER to offset GI discomfort. \par Monitor FS pre-meal. \par Most readings AM fasting are WNL, which is expected on prednisone, mostly having post-prandial elevations as  result of prednisone taper. \par Contact office if blood glucose readings improve once steroid doses are tapered down. \par Discussed with patient effects of tacrolimus on pancreatic beta cell function, and that we will have to assess his response to steroid taper and long-term use of tacrolimus, he verbalizes understanding.

## 2022-07-29 NOTE — REVIEW OF SYSTEMS
[Fatigue] : fatigue [Shortness Of Breath] : shortness of breath [Tremors] : tremors [Negative] : Psychiatric [Visual Field Defect] : no visual field defect [FreeTextEntry6] : with exertion

## 2022-07-29 NOTE — PHYSICAL EXAM
[Alert] : alert [No Acute Distress] : no acute distress [Normal Hearing] : hearing was normal [No Neck Mass] : no neck mass was observed [No LAD] : no lymphadenopathy [Thyroid Not Enlarged] : the thyroid was not enlarged [No Respiratory Distress] : no respiratory distress [No Accessory Muscle Use] : no accessory muscle use [Clear to Auscultation] : lungs were clear to auscultation bilaterally [Normal PMI] : the apical impulse was normal [Normal S1, S2] : normal S1 and S2 [Normal Bowel Sounds] : normal bowel sounds [Not Tender] : non-tender [Soft] : abdomen soft [Acanthosis Nigricans] : no acanthosis nigricans [Oriented x3] : oriented to person, place, and time [Normal Affect] : the affect was normal [Normal Insight/Judgement] : insight and judgment were intact [de-identified] : tachycardic 112

## 2022-08-02 ENCOUNTER — APPOINTMENT (OUTPATIENT)
Dept: HEART FAILURE | Facility: CLINIC | Age: 67
End: 2022-08-02

## 2022-08-02 ENCOUNTER — RESULT REVIEW (OUTPATIENT)
Age: 67
End: 2022-08-02

## 2022-08-02 ENCOUNTER — TRANSCRIPTION ENCOUNTER (OUTPATIENT)
Age: 67
End: 2022-08-02

## 2022-08-02 ENCOUNTER — OUTPATIENT (OUTPATIENT)
Dept: OUTPATIENT SERVICES | Facility: HOSPITAL | Age: 67
LOS: 1 days | End: 2022-08-02
Payer: MEDICAID

## 2022-08-02 ENCOUNTER — APPOINTMENT (OUTPATIENT)
Dept: CV DIAGNOSITCS | Facility: HOSPITAL | Age: 67
End: 2022-08-02

## 2022-08-02 VITALS
RESPIRATION RATE: 18 BRPM | SYSTOLIC BLOOD PRESSURE: 152 MMHG | HEIGHT: 69 IN | TEMPERATURE: 98 F | OXYGEN SATURATION: 100 % | HEART RATE: 97 BPM | WEIGHT: 145 LBS | BODY MASS INDEX: 21.48 KG/M2 | DIASTOLIC BLOOD PRESSURE: 100 MMHG

## 2022-08-02 VITALS
SYSTOLIC BLOOD PRESSURE: 150 MMHG | HEART RATE: 97 BPM | DIASTOLIC BLOOD PRESSURE: 97 MMHG | RESPIRATION RATE: 16 BRPM | OXYGEN SATURATION: 98 %

## 2022-08-02 VITALS
TEMPERATURE: 98 F | DIASTOLIC BLOOD PRESSURE: 100 MMHG | HEART RATE: 97 BPM | RESPIRATION RATE: 16 BRPM | SYSTOLIC BLOOD PRESSURE: 152 MMHG | OXYGEN SATURATION: 100 %

## 2022-08-02 DIAGNOSIS — Z95.0 PRESENCE OF CARDIAC PACEMAKER: Chronic | ICD-10-CM

## 2022-08-02 DIAGNOSIS — Z94.1 HEART TRANSPLANT STATUS: ICD-10-CM

## 2022-08-02 DIAGNOSIS — Z98.890 OTHER SPECIFIED POSTPROCEDURAL STATES: Chronic | ICD-10-CM

## 2022-08-02 LAB
ALBUMIN SERPL ELPH-MCNC: 4.7 G/DL — SIGNIFICANT CHANGE UP (ref 3.3–5)
ALP SERPL-CCNC: 77 U/L — SIGNIFICANT CHANGE UP (ref 40–120)
ALT FLD-CCNC: 17 U/L — SIGNIFICANT CHANGE UP (ref 10–45)
ANION GAP SERPL CALC-SCNC: 13 MMOL/L — SIGNIFICANT CHANGE UP (ref 5–17)
AST SERPL-CCNC: 18 U/L — SIGNIFICANT CHANGE UP (ref 10–40)
BASOPHILS # BLD AUTO: 0.04 K/UL — SIGNIFICANT CHANGE UP (ref 0–0.2)
BASOPHILS NFR BLD AUTO: 0.4 % — SIGNIFICANT CHANGE UP (ref 0–2)
BILIRUB SERPL-MCNC: 0.6 MG/DL — SIGNIFICANT CHANGE UP (ref 0.2–1.2)
BUN SERPL-MCNC: 30 MG/DL — HIGH (ref 7–23)
CALCIUM SERPL-MCNC: 9.9 MG/DL — SIGNIFICANT CHANGE UP (ref 8.4–10.5)
CHLORIDE SERPL-SCNC: 101 MMOL/L — SIGNIFICANT CHANGE UP (ref 96–108)
CO2 SERPL-SCNC: 24 MMOL/L — SIGNIFICANT CHANGE UP (ref 22–31)
CREAT SERPL-MCNC: 1.25 MG/DL — SIGNIFICANT CHANGE UP (ref 0.5–1.3)
EGFR: 64 ML/MIN/1.73M2 — SIGNIFICANT CHANGE UP
EOSINOPHIL # BLD AUTO: 0.01 K/UL — SIGNIFICANT CHANGE UP (ref 0–0.5)
EOSINOPHIL NFR BLD AUTO: 0.1 % — SIGNIFICANT CHANGE UP (ref 0–6)
GLUCOSE SERPL-MCNC: 96 MG/DL — SIGNIFICANT CHANGE UP (ref 70–99)
HCT VFR BLD CALC: 43 % — SIGNIFICANT CHANGE UP (ref 39–50)
HGB BLD-MCNC: 14.5 G/DL — SIGNIFICANT CHANGE UP (ref 13–17)
IMM GRANULOCYTES NFR BLD AUTO: 1.1 % — SIGNIFICANT CHANGE UP (ref 0–1.5)
LYMPHOCYTES # BLD AUTO: 2.04 K/UL — SIGNIFICANT CHANGE UP (ref 1–3.3)
LYMPHOCYTES # BLD AUTO: 21.2 % — SIGNIFICANT CHANGE UP (ref 13–44)
MAGNESIUM SERPL-MCNC: 1.7 MG/DL — SIGNIFICANT CHANGE UP (ref 1.6–2.6)
MCHC RBC-ENTMCNC: 31.3 PG — SIGNIFICANT CHANGE UP (ref 27–34)
MCHC RBC-ENTMCNC: 33.7 GM/DL — SIGNIFICANT CHANGE UP (ref 32–36)
MCV RBC AUTO: 92.7 FL — SIGNIFICANT CHANGE UP (ref 80–100)
MONOCYTES # BLD AUTO: 0.32 K/UL — SIGNIFICANT CHANGE UP (ref 0–0.9)
MONOCYTES NFR BLD AUTO: 3.3 % — SIGNIFICANT CHANGE UP (ref 2–14)
NEUTROPHILS # BLD AUTO: 7.11 K/UL — SIGNIFICANT CHANGE UP (ref 1.8–7.4)
NEUTROPHILS NFR BLD AUTO: 73.9 % — SIGNIFICANT CHANGE UP (ref 43–77)
NRBC # BLD: 0 /100 WBCS — SIGNIFICANT CHANGE UP (ref 0–0)
PLATELET # BLD AUTO: 254 K/UL — SIGNIFICANT CHANGE UP (ref 150–400)
POTASSIUM SERPL-MCNC: 3.9 MMOL/L — SIGNIFICANT CHANGE UP (ref 3.5–5.3)
POTASSIUM SERPL-SCNC: 3.9 MMOL/L — SIGNIFICANT CHANGE UP (ref 3.5–5.3)
PROT SERPL-MCNC: 7.2 G/DL — SIGNIFICANT CHANGE UP (ref 6–8.3)
RBC # BLD: 4.64 M/UL — SIGNIFICANT CHANGE UP (ref 4.2–5.8)
RBC # FLD: 19.3 % — HIGH (ref 10.3–14.5)
SODIUM SERPL-SCNC: 138 MMOL/L — SIGNIFICANT CHANGE UP (ref 135–145)
TACROLIMUS SERPL-MCNC: 8.1 NG/ML — SIGNIFICANT CHANGE UP
WBC # BLD: 9.63 K/UL — SIGNIFICANT CHANGE UP (ref 3.8–10.5)
WBC # FLD AUTO: 9.63 K/UL — SIGNIFICANT CHANGE UP (ref 3.8–10.5)

## 2022-08-02 PROCEDURE — 93010 ELECTROCARDIOGRAM REPORT: CPT

## 2022-08-02 PROCEDURE — 93321 DOPPLER ECHO F-UP/LMTD STD: CPT | Mod: 26

## 2022-08-02 PROCEDURE — 93308 TTE F-UP OR LMTD: CPT | Mod: 26

## 2022-08-02 PROCEDURE — 88346 IMFLUOR 1ST 1ANTB STAIN PX: CPT | Mod: 26

## 2022-08-02 PROCEDURE — 99215 OFFICE O/P EST HI 40 MIN: CPT

## 2022-08-02 PROCEDURE — C1894: CPT

## 2022-08-02 PROCEDURE — 93005 ELECTROCARDIOGRAM TRACING: CPT

## 2022-08-02 PROCEDURE — 80197 ASSAY OF TACROLIMUS: CPT

## 2022-08-02 PROCEDURE — 93505 ENDOMYOCARDIAL BIOPSY: CPT

## 2022-08-02 PROCEDURE — 99152 MOD SED SAME PHYS/QHP 5/>YRS: CPT

## 2022-08-02 PROCEDURE — 88307 TISSUE EXAM BY PATHOLOGIST: CPT | Mod: 26

## 2022-08-02 PROCEDURE — 83735 ASSAY OF MAGNESIUM: CPT

## 2022-08-02 PROCEDURE — 93505 ENDOMYOCARDIAL BIOPSY: CPT | Mod: 26

## 2022-08-02 PROCEDURE — 88346 IMFLUOR 1ST 1ANTB STAIN PX: CPT

## 2022-08-02 PROCEDURE — 85025 COMPLETE CBC W/AUTO DIFF WBC: CPT

## 2022-08-02 PROCEDURE — 88307 TISSUE EXAM BY PATHOLOGIST: CPT

## 2022-08-02 PROCEDURE — C1889: CPT

## 2022-08-02 PROCEDURE — 80053 COMPREHEN METABOLIC PANEL: CPT

## 2022-08-02 PROCEDURE — 93308 TTE F-UP OR LMTD: CPT

## 2022-08-02 PROCEDURE — 93321 DOPPLER ECHO F-UP/LMTD STD: CPT

## 2022-08-02 RX ORDER — SODIUM ZIRCONIUM CYCLOSILICATE 10 G/10G
10 POWDER, FOR SUSPENSION ORAL DAILY
Qty: 33 | Refills: 5 | Status: DISCONTINUED | COMMUNITY
Start: 2022-06-29 | End: 2022-08-02

## 2022-08-02 RX ORDER — HYDRALAZINE HYDROCHLORIDE 25 MG/1
25 TABLET ORAL 3 TIMES DAILY
Qty: 90 | Refills: 5 | Status: DISCONTINUED | COMMUNITY
Start: 2022-06-29 | End: 2022-08-02

## 2022-08-02 RX ORDER — SODIUM ZIRCONIUM CYCLOSILICATE 10 G/10G
1 POWDER, FOR SUSPENSION ORAL
Qty: 0 | Refills: 0 | DISCHARGE

## 2022-08-02 NOTE — ASU DISCHARGE PLAN (ADULT/PEDIATRIC) - NS MD DC FALL RISK RISK
For information on Fall & Injury Prevention, visit: [email or web address] OR  [email or web address] OR  https://www.cdc.gov/steadi/patient.html

## 2022-08-02 NOTE — ASU DISCHARGE PLAN (ADULT/PEDIATRIC) - ASU DC SPECIAL INSTRUCTIONSFT
Wound Care:   the day AFTER your procedure remove bandage GENTLY, and clean using  mild soap and gentle warm, water stream, pat dry. leave OPEN to air. YOU MAY SHOWER   DO NOT apply lotions, creams, ointments, powder, perfumes to your incision site  DO NOT SOAK your site for 1 week (no baths, no pools, no tubs, etc...)  Check  your groin and /or wrist daily. A small amount of bruising, and soreness are normal    ACTIVITY: for 24 hours   - DO NOT DRIVE  - DO NOT make any important decisions or sign legal documents   - DO NOT operate heavy machineries  - you may resume sexual activity in 48 hours, unless otherwise instructed by your cardiologist     If your procedure was done through the WRIST: for the NEXT 3 DAYS:  - avoid pushing, pulling, with that affected wrist   - avoid repeated movement of that hand and wrist (eg: typing, hammering)  - DO NOT LIFT anything more than 5 lbs     If your procedure was done through the GROIN: for the NEXT 5 DAYS  - Limit climbing stairs, DO NOT soak in bathtub or pool  - no strenous activities, pushing, pulling, straining  - Do not lift anything 10lbs or heavier     MEDICATION:   take your medications as explained (see discharge paperwork)   If you received a STENT, you will be taking antiplatelet medications to KEEP YOUR STENT OPEN ( eg: Aspirin, Plavix, Brilinta, Effient, etc).  Take as prescribed DO NOT STOP taking them without consulting with your cardiologist first.     Follow heart healthy diet reccomended by your doctor, , if you smoke STOP SMOKING ( may call [phone] for center of tobacco control if you need assistance)     CALL your doctor to make appointment in 2 WEEKS     ***CALL YOUR DOCTOR***  if you experience: fever, chills, body aches, or severe pain, swelling, redness, heat or yellow discharge at incision site  If you experience Bleeding or excruciating pain at the procedural site, swelling ( golf ball size) at your procedural site  If you experience CHEST PAIN  If you experience extremity numbness, tingling, temperature change ( of your procedural site)   If you are unable to reach your doctor, you may contact:   -Cardiology Office at [facility] at [phone] or   - [facility] [phone]  - [facility] [phone]

## 2022-08-02 NOTE — H&P CARDIOLOGY - HISTORY OF PRESENT ILLNESS
No changes in outpatient H&P dated 7/20/2022  Allergies reviewed, physical exam remains unchanged  All medications reviewed. Presents today for [facility] with biopsy.

## 2022-08-02 NOTE — ASU DISCHARGE PLAN (ADULT/PEDIATRIC) - CARE PROVIDER_API CALL
Vivian Nuñez; PhD)  Adv Heart Fail Trnsplnt Cardio; Cardiovascular Disease; Internal Medicine  [address] [place] [address]  Phone: [phone]  Fax: [phone]  Follow Up Time: 2 weeks

## 2022-08-03 LAB
CMV DNA CSF QL NAA+PROBE: SIGNIFICANT CHANGE UP
CMV DNA SPEC NAA+PROBE-LOG#: SIGNIFICANT CHANGE UP LOG10IU/ML
SURGICAL PATHOLOGY STUDY: SIGNIFICANT CHANGE UP

## 2022-08-09 NOTE — HISTORY OF PRESENT ILLNESS
[FreeTextEntry1] : 67 YO M with a history of ACC/AHA Stage D mixed NICM/ICM (likely familial with strong FH and early arrhythmia history in his 30's) with LVED 5.2 cm and LVEF 10-15% s/p PPM upgraded to CRT-D, CAD s/p PCI to mLAD 4/2022, well controlled DM2 (A1c 6.2%), and CKD III (Cr 1.4) and VT who is now s/p OHT on 6/21/22 (ischemic time 261 mins, CMV +/+, Toxo -/-).\par \par Pt  initially presented to [facility] 5/20 with near syncope in setting of worsening HF symptoms and found to have 41 episodes of VT, many terminating with ATP. LHC did not reveal new obstructive CAD and he underwent EPS which did not reveal endocardial substrate amenable for ablation. RHC revealed severely depressed cardiac output and he was transferred to [facility] 5/26 for advanced therapies evaluation. IABP was placed and he was listed UNOS status 2e for OHT. He was briefly made status 7 as he became febrile without leukocytosis, then bld cx negative x48hrs he was re-activated UNOS status 2e. \par \par A suitable donor was identified and patient underwent OHT with Dr. Earl/Maria C on 6/21/22 (ischemic time 261 mins, CMV +/+, Toxo -/-). Patient was successfully extubated and IABP was removed on POD 1.  Cultures from the ICD site in the OR were positive for staph epidermidis/morganella morganii and he was treated with IV Vancomycin and Cefepime. CT scan demonstrated residual fibrous capsule in the region of the previous cardiac device in the left chest wall. Pt was then discharged home on oral antibiotics on 7/8/22.\par \par Pt is here today for f/u visit and biopsy #4. Since last week, he reports feeling tired. He reports he is ambulating around the home without difficulty, and walking 10-15 minutes outside yet. He denies CP, SOB at rest, HSIEH, orthopnea, PND, LH/dizziness, abdominal discomfort, palpitations, and syncope. No headache or tremor. No fevers or chills. His appetite is normal and his bowel and bladder habits are normal, specifically without diarrhea. Home vital signs logs reviewed and average BP 90's-110's/60-80's, FS mostly 98-200mg/dL.

## 2022-08-09 NOTE — END OF VISIT
[Time Spent: ___ minutes] : I have spent [unfilled] minutes of time on the encounter. [FreeTextEntry3] : I, Dr. Nuñez, personally performed the evaluation and management (E/M) services for this established patient who presents today with (a) new problem(s)/exacerbation of (an) existing condition(s).  That E/M includes conducting the examination, assessing all new/exacerbated conditions, and establishing a new plan of care.  Today, my REBECA, Pogoseat, was here to observe my evaluation and management services for this new problem/exacerbated condition to be followed going forward.

## 2022-08-09 NOTE — PHYSICAL EXAM
[No Xanthelasma] : no xanthelasma [Normal Venous Pressure] : normal venous pressure [Normal Radial B/L] : normal radial B/L [Normal] : alert and oriented, normal memory [de-identified] : not assessed - wearing a mask [de-identified] : JVP < 6 cm H2O, no HJR [de-identified] : +midsternal chest wound well healed, left chest AICD explant site C/D/I

## 2022-08-09 NOTE — CARDIOLOGY SUMMARY
[de-identified] : \par 8/2/22 ECG: NSR 95bpm, RBBB, when compared to EKG 7/20/22 no significant changes\par 7/20/22 ECG: NSR at 100bpm RBBB\par 7/13/22 ECG: NSR at 94 bpm, RBBB. [de-identified] : \par TTE 8/2/22: limited study in the setting of an RV biopsy\par 1. Normal right ventricular size and function.\par 2. Small pericardiall effusion.\par A bioptome is noted in the right heart. No change to the size of the pericardial effusion or RV function\par 7/20/22: TTE: limited study: hyperdynamic LVSF, normal RV size and function, normal pericardium with trace to small pericardial effusion adjacent to RV\par 07/05/22 TTE: limited study: normal biV function, small loculated pericardial effusion at the LV apex, measuring approx 1.0 cm seen both pre and post images. [de-identified] : \par 6/28: RA 9, RV 3/11, PA 62/27/41, wedge 21 with v wave 31, PA sat 69.3%, /81/102, CO/Ci 6.87/3.62, grade 1R/2\par 7/5: RA 2, RV 35/3, PA 36/18/26, Wedge 15, PA sat 71.7, Kristy CO/CI 5.4/2.8, /75/93, SVR 1345 dsc, 1R/1A\par 7/13: RA 3, PAP 36/14/24 PCW 8, Pa sat 71%, Kristy CO/CI 6.1/3.2, EMBx ISHLT Grade 1R/1A\par 7/20: RA 3 PAP 47/20/30 PCW 15 PaSat 67% Kristy CO/CI 5.9/3.2 EMBx ISHLT Grade 1R/1A\par 8/2: RA 4, PAP 35/16/25 PCW 12 Pasat 68% Kristy CO/CI 5.3/2.9, EMBx ISHLT Grade 1R/1A

## 2022-08-09 NOTE — CARDIOLOGY SUMMARY
[de-identified] : \par 7/20/22 ECG: NSR at 100bpm RBBB\par 7/13/22 ECG: NSR at 94 bpm, RBBB. [de-identified] : \par 7/20/22: TTE: limited study: hyperdynamic LVSF, normal RV size and function, normal pericardium with trace to small pericardial effusion adjacent to RV\par 07/05/22 TTE: limited study: normal biV function, small loculated pericardial effusion at the LV apex, measuring approx 1.0 cm seen both pre and post images. [de-identified] : \par 6/28: RA 9, RV 3/11, PA 62/27/41, wedge 21 with v wave 31, PA sat 69.3%, /81/102, CO/Ci 6.87/3.62, grade 1R/2\par 7/5: RA 2, RV 35/3, PA 36/18/26, Wedge 15, PA sat 71.7, Kristy CO/CI 5.4/2.8, /75/93, SVR 1345 dsc, 1R/1A\par 7/13: RA 3, PAP 36/14/24 PCW 8, Pa sat 71%, Kristy CO/CI 6.1/3.2, EMBx ISHLT Grade 1R/1A\par 7/20: RA 3 PAP 47/20/30 PCW 15 PaSat 67% Kristy CO/CO 5.9/3.2 EMBx ISHLT Grade 1R/1A

## 2022-08-09 NOTE — DISCUSSION/SUMMARY
[___ Week(s)] : in [unfilled] week(s) [Patient] : the patient [FreeTextEntry2] : and his wife [FreeTextEntry1] : 67 YO M with a history of ACC/AHA Stage D mixed NICM/ICM (likely familial with strong FH and early arrhythmia history in his 30's) with LVED 5.2 cm and LVEF 10-15% s/p PPM upgraded to CRT-D, CAD s/p PCI to mLAD 4/2022, well controlled DM2 (A1c 6.2%), and CKD III (Cr 1.4) and VT who is now s/p OHT on 6/21/22 (ischemic time 261 mins, CMV +/+, Toxo -/-), doing clinically well.\par \par #s/p OHT\par - continue ASA 81mg daily and rosuvastatin 5mg daily for prevention TCAD\par - RHC showed normal hemodynamics and EMBx Grade 1R/1A\par - continue sildenafil 10mg TID\par - check 1st allomap with next biopsy visit\par \par #Immunosuppression\par - continue prograf for goal trough 12-14. Trough today =8. Continue current dose of 2mg bid and repeat level next week. Reinforced proper timing and food intake with medications.\par - decrease cellcept to 750mg bid in the setting of pt having looser stools\par - taper prednisone per protocol\par \par #prophylaxis\par - CMV +/+: continue valcyte 450mg BID. CMV PCR today not detected\par - Toxo -/-: no ppx needed\par - PJP: continue mepron 1500mg daily (bactrim was not trialed due to hyperkalemia and borderline elevated SCr)\par - thrush: continue nystatin S&S qid until prednisone <10mg daily\par \par #ICD pocket +staph epidermiditis/morganella morganii\par - leukocytosis improving 16-->13-->11.8 -->7.1 (today)\par - completed Cefpodoxime through 7/15\par - completed Doxyclycine through 7/21\par - per discharge plans, if WBC continue to remain elevated will need to consider bringing patient back to OR for ICD pocket extraction\par - CT C/A/P 7/5 showed residual fibrous capsule in the region of the previous cardiac device in the left chest wall. 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 Jaramillo in 4 weeks post discharge\par - avoid nephrotoxic agents\par - remain off diuretics\par \par #HTN\par - continue nifedipine 60mg daily\par - pt to maintain home BP log and notify our team if >140/90\par \par #hyperglycemia\par - folllowed by endocrine, appreciate recs\par - continue metformin 1000mg bid and levemir 6units qhs \par - continue to monitor FS \par \par #Health maintenance\par - continue mag oxide and MVI\par - continue calcium citrate + vitamin D\par - continue PPI\par \par RTC/bx two weeks\par \par

## 2022-08-09 NOTE — DISCUSSION/SUMMARY
[___ Week(s)] : in [unfilled] week(s) [FreeTextEntry1] : 67 YO M with a history of ACC/AHA Stage D mixed NICM/ICM (likely familial with strong FH and early arrhythmia history in his 30's) with LVED 5.2 cm and LVEF 10-15% s/p PPM upgraded to CRT-D, CAD s/p PCI to mLAD 4/2022, well controlled DM2 (A1c 6.2%), and CKD III (Cr 1.4) and VT who is now s/p OHT on 6/21/22 (ischemic time 261 mins, CMV +/+, Toxo -/-), doing clinically well.\par \par #s/p OHT\par - continue ASA 81mg daily and rosuvastatin 5mg daily for prevention TCAD\par - RHC showed normal hemodynamics and EMBx Grade 1R/1A\par - continue sildenafil 10mg TID\par \par #Immunosuppression\par - continue prograf for goal trough 12-14. Trough today =13.8 Continue current dose of 2mg bid\par - continue cellcept 1000mg bid\par - taper prednisone per protocol\par \par #prophylaxis\par - CMV +/+: continue valcyte 450mg BID. CMV PCR today not detected\par - Toxo -/-: no ppx needed\par - PJP: continue mepron 1500mg daily (bactrim was not trialed due to hyperkalemia and borderline elevated SCr)\par - thrush: continue nystatin S&S qid until prednisone <10mg daily\par \par #ICD pocket +staph epidermiditis/morganella morganii\par - leukocytosis improving 16-->13-->11.8 -->7.1 (today)\par - completed Cefpodoxime through 7/15\par - continue Doxyclycine through 7/21\par - per discharge plans, if WBC continue to remain elevated will need to consider bringing patient back to OR for ICD pocket extraction\par - CT C/A/P 7/5 showed residual fibrous capsule in the region of the previous cardiac device in the left chest wall. 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 Jaramillo in 4 weeks post discharge\par - avoid nephrotoxic agents\par - remain off diuretics\par \par #HTN\par - continue hydralazine 25mg TID\par - continue nifedipine 60mg daily\par - pt to maintain home BP log, if SBP consistently <100, will decrease hydralazine to 10mg TID\par \par #hyperglycemia\par - seen by endocrine and started on metformin 500mg bid and levemir 10units qhs \par - has f/u endocrine appt 7/29\par - continue to monitor FS \par \par #Health maintenance\par - continue mag oxide and MVI\par - continue calcium citrate + vitamin D\par - continue PPI\par \par RTC/bx two weeks\par \par

## 2022-08-09 NOTE — PHYSICAL EXAM
[No Edema] : no edema [Normal] : moves all extremities, no focal deficits, normal speech [de-identified] : JVP < 6 cm H2O, no HJR [de-identified] : +midsternal chest wound healing, left upper chest prior AICD site c/d/i

## 2022-08-09 NOTE — HISTORY OF PRESENT ILLNESS
[FreeTextEntry1] : 67 YO M with a history of ACC/AHA Stage D mixed NICM/ICM (likely familial with strong FH and early arrhythmia history in his 30's) with LVED 5.2 cm and LVEF 10-15% s/p PPM upgraded to CRT-D, CAD s/p PCI to mLAD 4/2022, well controlled DM2 (A1c 6.2%), and CKD III (Cr 1.4) and VT who is now s/p OHT on 6/21/22 (ischemic time 261 mins, CMV +/+, Toxo -/-).\par \par Pt  initially presented to [facility] 5/20 with near syncope in setting of worsening HF symptoms and found to have 41 episodes of VT, many terminating with ATP. LHC did not reveal new obstructive CAD and he underwent EPS which did not reveal endocardial substrate amenable for ablation. RHC revealed severely depressed cardiac output and he was transferred to [facility] 5/26 for advanced therapies evaluation. IABP was placed and he was listed UNOS status 2e for OHT. He was briefly made status 7 as he became febrile without leukocytosis, then bld cx negative x48hrs he was re-activated UNOS status 2e. \par \par A suitable donor was identified and patient underwent OHT with Dr. Earl/Maria C on 6/21/22 (ischemic time 261 mins, CMV +/+, Toxo -/-). Patient was successfully extubated and IABP was removed on POD 1.  Cultures from the ICD site in the OR were positive for staph epidermidis/morganella morganii and he was treated with IV Vancomycin and Cefepime. CT scan demonstrated residual fibrous capsule in the region of the previous cardiac device in the left chest wall. Pt was then discharged home on oral antibiotics on 7/8/22.\par \par Pt is here today for f/u visit and biopsy #5. Since last seen, he reports feeling better and more energy. Home PT was started and he took a walk around his block yesterday. He reports looser stools in the am sometimes but most days solid stools. He denies CP, SOB at rest, HSIEH, orthopnea, PND, LH/dizziness, abdominal discomfort, palpitations, and syncope. No headache or tremor. No fevers or chills. His appetite is normal.  Home vital signs logs reviewed and average -140's/60-80's, FS mostly 100-150mg/dL.

## 2022-08-09 NOTE — END OF VISIT
[Time Spent: ___ minutes] : I have spent [unfilled] minutes of time on the encounter. [FreeTextEntry3] : I, Dr. Nuñez, personally performed the evaluation and management (E/M) services for this established patient who presents today with (a) new problem(s)/exacerbation of (an) existing condition(s).  That E/M includes conducting the examination, assessing all new/exacerbated conditions, and establishing a new plan of care.  Today, my REBECA, [employer], was here to observe my evaluation and management services for this new problem/exacerbated condition to be followed going forward.

## 2022-08-10 LAB
ALBUMIN SERPL ELPH-MCNC: 4.7 G/DL
ALP BLD-CCNC: 59 U/L
ALT SERPL-CCNC: 15 U/L
ANION GAP SERPL CALC-SCNC: 13 MMOL/L
AST SERPL-CCNC: 17 U/L
BASOPHILS # BLD AUTO: 0.05 K/UL
BASOPHILS NFR BLD AUTO: 0.5 %
BILIRUB SERPL-MCNC: 0.7 MG/DL
BUN SERPL-MCNC: 29 MG/DL
CALCIUM SERPL-MCNC: 10.3 MG/DL
CHLORIDE SERPL-SCNC: 105 MMOL/L
CO2 SERPL-SCNC: 20 MMOL/L
CREAT SERPL-MCNC: 1.24 MG/DL
CULTURE RESULTS: SIGNIFICANT CHANGE UP
CULTURE RESULTS: SIGNIFICANT CHANGE UP
EGFR: 65 ML/MIN/1.73M2
EOSINOPHIL # BLD AUTO: 0.02 K/UL
EOSINOPHIL NFR BLD AUTO: 0.2 %
GLUCOSE SERPL-MCNC: 91 MG/DL
HCT VFR BLD CALC: 41.3 %
HGB BLD-MCNC: 13.4 G/DL
IMM GRANULOCYTES NFR BLD AUTO: 0.9 %
LYMPHOCYTES # BLD AUTO: 2.56 K/UL
LYMPHOCYTES NFR BLD AUTO: 26.1 %
MAGNESIUM SERPL-MCNC: 1.6 MG/DL
MAN DIFF?: NORMAL
MCHC RBC-ENTMCNC: 30.4 PG
MCHC RBC-ENTMCNC: 32.4 GM/DL
MCV RBC AUTO: 93.7 FL
MONOCYTES # BLD AUTO: 0.27 K/UL
MONOCYTES NFR BLD AUTO: 2.8 %
NEUTROPHILS # BLD AUTO: 6.81 K/UL
NEUTROPHILS NFR BLD AUTO: 69.5 %
PLATELET # BLD AUTO: 260 K/UL
POTASSIUM SERPL-SCNC: 4.5 MMOL/L
PROT SERPL-MCNC: 6.9 G/DL
RBC # BLD: 4.41 M/UL
RBC # FLD: 19.9 %
SODIUM SERPL-SCNC: 139 MMOL/L
SPECIMEN SOURCE: SIGNIFICANT CHANGE UP
SPECIMEN SOURCE: SIGNIFICANT CHANGE UP
TACROLIMUS SERPL-MCNC: 12.4 NG/ML
WBC # FLD AUTO: 9.8 K/UL

## 2022-08-16 ENCOUNTER — APPOINTMENT (OUTPATIENT)
Dept: HEART FAILURE | Facility: CLINIC | Age: 67
End: 2022-08-16

## 2022-08-16 ENCOUNTER — TRANSCRIPTION ENCOUNTER (OUTPATIENT)
Age: 67
End: 2022-08-16

## 2022-08-16 ENCOUNTER — OUTPATIENT (OUTPATIENT)
Dept: OUTPATIENT SERVICES | Facility: HOSPITAL | Age: 67
LOS: 1 days | End: 2022-08-16
Payer: MEDICAID

## 2022-08-16 ENCOUNTER — RESULT REVIEW (OUTPATIENT)
Age: 67
End: 2022-08-16

## 2022-08-16 ENCOUNTER — APPOINTMENT (OUTPATIENT)
Dept: CV DIAGNOSITCS | Facility: HOSPITAL | Age: 67
End: 2022-08-16

## 2022-08-16 VITALS
RESPIRATION RATE: 16 BRPM | TEMPERATURE: 96 F | HEART RATE: 96 BPM | SYSTOLIC BLOOD PRESSURE: 134 MMHG | DIASTOLIC BLOOD PRESSURE: 97 MMHG | OXYGEN SATURATION: 98 %

## 2022-08-16 VITALS
HEART RATE: 96 BPM | OXYGEN SATURATION: 98 % | SYSTOLIC BLOOD PRESSURE: 129 MMHG | DIASTOLIC BLOOD PRESSURE: 92 MMHG | RESPIRATION RATE: 16 BRPM

## 2022-08-16 DIAGNOSIS — Z95.0 PRESENCE OF CARDIAC PACEMAKER: Chronic | ICD-10-CM

## 2022-08-16 DIAGNOSIS — Z98.890 OTHER SPECIFIED POSTPROCEDURAL STATES: Chronic | ICD-10-CM

## 2022-08-16 DIAGNOSIS — Z94.1 HEART TRANSPLANT STATUS: ICD-10-CM

## 2022-08-16 LAB
ALBUMIN SERPL ELPH-MCNC: 4.8 G/DL — SIGNIFICANT CHANGE UP (ref 3.3–5)
ALP SERPL-CCNC: 60 U/L — SIGNIFICANT CHANGE UP (ref 40–120)
ALT FLD-CCNC: 17 U/L — SIGNIFICANT CHANGE UP (ref 10–45)
ANION GAP SERPL CALC-SCNC: 15 MMOL/L — SIGNIFICANT CHANGE UP (ref 5–17)
AST SERPL-CCNC: 17 U/L — SIGNIFICANT CHANGE UP (ref 10–40)
BASOPHILS # BLD AUTO: 0.05 K/UL — SIGNIFICANT CHANGE UP (ref 0–0.2)
BASOPHILS NFR BLD AUTO: 0.5 % — SIGNIFICANT CHANGE UP (ref 0–2)
BILIRUB SERPL-MCNC: 0.8 MG/DL — SIGNIFICANT CHANGE UP (ref 0.2–1.2)
BUN SERPL-MCNC: 33 MG/DL — HIGH (ref 7–23)
CALCIUM SERPL-MCNC: 10.1 MG/DL — SIGNIFICANT CHANGE UP (ref 8.4–10.5)
CHLORIDE SERPL-SCNC: 102 MMOL/L — SIGNIFICANT CHANGE UP (ref 96–108)
CO2 SERPL-SCNC: 22 MMOL/L — SIGNIFICANT CHANGE UP (ref 22–31)
CREAT SERPL-MCNC: 1.36 MG/DL — HIGH (ref 0.5–1.3)
EGFR: 58 ML/MIN/1.73M2 — LOW
EOSINOPHIL # BLD AUTO: 0.03 K/UL — SIGNIFICANT CHANGE UP (ref 0–0.5)
EOSINOPHIL NFR BLD AUTO: 0.3 % — SIGNIFICANT CHANGE UP (ref 0–6)
GLUCOSE SERPL-MCNC: 86 MG/DL — SIGNIFICANT CHANGE UP (ref 70–99)
HCT VFR BLD CALC: 43.4 % — SIGNIFICANT CHANGE UP (ref 39–50)
HGB BLD-MCNC: 14.5 G/DL — SIGNIFICANT CHANGE UP (ref 13–17)
IMM GRANULOCYTES NFR BLD AUTO: 0.8 % — SIGNIFICANT CHANGE UP (ref 0–1.5)
LYMPHOCYTES # BLD AUTO: 2.06 K/UL — SIGNIFICANT CHANGE UP (ref 1–3.3)
LYMPHOCYTES # BLD AUTO: 22.2 % — SIGNIFICANT CHANGE UP (ref 13–44)
MAGNESIUM SERPL-MCNC: 1.6 MG/DL — SIGNIFICANT CHANGE UP (ref 1.6–2.6)
MCHC RBC-ENTMCNC: 30.7 PG — SIGNIFICANT CHANGE UP (ref 27–34)
MCHC RBC-ENTMCNC: 33.4 GM/DL — SIGNIFICANT CHANGE UP (ref 32–36)
MCV RBC AUTO: 91.8 FL — SIGNIFICANT CHANGE UP (ref 80–100)
MONOCYTES # BLD AUTO: 0.15 K/UL — SIGNIFICANT CHANGE UP (ref 0–0.9)
MONOCYTES NFR BLD AUTO: 1.6 % — LOW (ref 2–14)
NEUTROPHILS # BLD AUTO: 6.91 K/UL — SIGNIFICANT CHANGE UP (ref 1.8–7.4)
NEUTROPHILS NFR BLD AUTO: 74.6 % — SIGNIFICANT CHANGE UP (ref 43–77)
NRBC # BLD: 0 /100 WBCS — SIGNIFICANT CHANGE UP (ref 0–0)
PLATELET # BLD AUTO: 250 K/UL — SIGNIFICANT CHANGE UP (ref 150–400)
POTASSIUM SERPL-MCNC: 4.6 MMOL/L — SIGNIFICANT CHANGE UP (ref 3.5–5.3)
POTASSIUM SERPL-SCNC: 4.6 MMOL/L — SIGNIFICANT CHANGE UP (ref 3.5–5.3)
PROT SERPL-MCNC: 7.6 G/DL — SIGNIFICANT CHANGE UP (ref 6–8.3)
RBC # BLD: 4.73 M/UL — SIGNIFICANT CHANGE UP (ref 4.2–5.8)
RBC # FLD: 18.7 % — HIGH (ref 10.3–14.5)
SODIUM SERPL-SCNC: 139 MMOL/L — SIGNIFICANT CHANGE UP (ref 135–145)
TACROLIMUS SERPL-MCNC: 14.9 NG/ML — SIGNIFICANT CHANGE UP
WBC # BLD: 9.27 K/UL — SIGNIFICANT CHANGE UP (ref 3.8–10.5)
WBC # FLD AUTO: 9.27 K/UL — SIGNIFICANT CHANGE UP (ref 3.8–10.5)

## 2022-08-16 PROCEDURE — C1894: CPT

## 2022-08-16 PROCEDURE — 96116 NUBHVL XM PHYS/QHP 1ST HR: CPT

## 2022-08-16 PROCEDURE — 93308 TTE F-UP OR LMTD: CPT | Mod: 26

## 2022-08-16 PROCEDURE — 93321 DOPPLER ECHO F-UP/LMTD STD: CPT | Mod: 26

## 2022-08-16 PROCEDURE — 88346 IMFLUOR 1ST 1ANTB STAIN PX: CPT

## 2022-08-16 PROCEDURE — 83735 ASSAY OF MAGNESIUM: CPT

## 2022-08-16 PROCEDURE — 93005 ELECTROCARDIOGRAM TRACING: CPT

## 2022-08-16 PROCEDURE — 85025 COMPLETE CBC W/AUTO DIFF WBC: CPT

## 2022-08-16 PROCEDURE — C1889: CPT

## 2022-08-16 PROCEDURE — 80053 COMPREHEN METABOLIC PANEL: CPT

## 2022-08-16 PROCEDURE — 80197 ASSAY OF TACROLIMUS: CPT

## 2022-08-16 PROCEDURE — 88307 TISSUE EXAM BY PATHOLOGIST: CPT

## 2022-08-16 PROCEDURE — 93308 TTE F-UP OR LMTD: CPT

## 2022-08-16 PROCEDURE — 88307 TISSUE EXAM BY PATHOLOGIST: CPT | Mod: 26

## 2022-08-16 PROCEDURE — 88346 IMFLUOR 1ST 1ANTB STAIN PX: CPT | Mod: 26

## 2022-08-16 PROCEDURE — 93321 DOPPLER ECHO F-UP/LMTD STD: CPT

## 2022-08-16 PROCEDURE — 99152 MOD SED SAME PHYS/QHP 5/>YRS: CPT

## 2022-08-16 PROCEDURE — 93505 ENDOMYOCARDIAL BIOPSY: CPT | Mod: 26

## 2022-08-16 PROCEDURE — 93010 ELECTROCARDIOGRAM REPORT: CPT

## 2022-08-16 PROCEDURE — 93505 ENDOMYOCARDIAL BIOPSY: CPT

## 2022-08-16 PROCEDURE — 99214 OFFICE O/P EST MOD 30 MIN: CPT | Mod: 25

## 2022-08-16 RX ORDER — HYDRALAZINE HCL 50 MG
1 TABLET ORAL
Qty: 0 | Refills: 0 | DISCHARGE

## 2022-08-16 RX ORDER — INSULIN DETEMIR 100/ML (3)
8 INSULIN PEN (ML) SUBCUTANEOUS
Qty: 0 | Refills: 0 | DISCHARGE

## 2022-08-16 RX ORDER — METFORMIN HYDROCHLORIDE 850 MG/1
1 TABLET ORAL
Qty: 0 | Refills: 0 | DISCHARGE

## 2022-08-16 NOTE — DISCUSSION/SUMMARY
[Patient] : the patient [___ Week(s)] : in [unfilled] week(s) [FreeTextEntry2] : and his wife [FreeTextEntry1] : 65 YO M with a history of ACC/AHA Stage D mixed NICM/ICM (likely familial with strong FH and early arrhythmia history in his 30's) with LVED 5.2 cm and LVEF 10-15% s/p PPM upgraded to CRT-D, CAD s/p PCI to mLAD 4/2022, well controlled DM2 (A1c 6.2%), and CKD III (Cr 1.4) and VT who is now s/p OHT on 6/21/22 (ischemic time 261 mins, CMV +/+, Toxo -/-), doing clinically well.\par \par #s/p OHT\par - continue ASA 81mg daily and rosuvastatin 5mg daily for prevention TCAD\par - RHC showed normal hemodynamics and EMBx Grade 1R/1A\par - continue sildenafil 10mg TID\par - check 1st allomap with next biopsy visit\par \par #Immunosuppression\par - continue prograf for goal trough 12-14. Trough today is pending\par - on reduced dose cellcept to 750mg bid in the setting of pt having looser stools, now improved \par - taper prednisone per protocol\par \par #prophylaxis\par - CMV +/+: continue valcyte 450mg BID. CMV PCR today not detected\par - Toxo -/-: no ppx needed\par - PJP: continue mepron 1500mg daily (bactrim was not trialed due to hyperkalemia and borderline elevated SCr)\par - thrush: continue nystatin S&S qid until prednisone <10mg daily\par \par #ICD pocket +staph epidermiditis/morganella morganii\par - leukocytosis resolved\par - completed Cefpodoxime through 7/15\par - completed Doxyclycine through 7/21\par - per discharge plans, if WBC continue to remain elevated will need to consider bringing patient back to OR for ICD pocket extraction\par - CT C/A/P 7/5 showed residual fibrous capsule in the region of the previous cardiac device in the left chest wall. 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 Jaramillo in 4 weeks post discharge\par - avoid nephrotoxic agents\par - remain off diuretics\par \par #HTN\par - continue nifedipine 60mg daily\par - pt to maintain home BP log and notify our team if >140/90\par \par #hyperglycemia\par - folllowed by endocrine, appreciate recs\par - continue metformin 1000mg bid and levemir 6units qhs \par - continue to monitor FS \par \par #Health maintenance\par - continue mag oxide and MVI\par - continue calcium citrate + vitamin D\par - continue PPI\par \par RTC/bx two weeks\par \par

## 2022-08-16 NOTE — PHYSICAL EXAM
[No Xanthelasma] : no xanthelasma [Normal Venous Pressure] : normal venous pressure [Normal Radial B/L] : normal radial B/L [Normal] : alert and oriented, normal memory [de-identified] : not assessed - wearing a mask [de-identified] : JVP < 6 cm H2O, no HJR [de-identified] : +midsternal chest wound well healed, left chest AICD explant site C/D/I

## 2022-08-16 NOTE — ASU DISCHARGE PLAN (ADULT/PEDIATRIC) - NS MD DC FALL RISK RISK
For information on Fall & Injury Prevention, visit: [email or web address] OR  [email or web address] OR  https://www.cdc.gov/steadi/patient.html

## 2022-08-16 NOTE — CARDIOLOGY SUMMARY
[de-identified] : \par 8/2/22 ECG: NSR 95bpm, RBBB, when compared to EKG 7/20/22 no significant changes\par 7/20/22 ECG: NSR at 100bpm RBBB\par 7/13/22 ECG: NSR at 94 bpm, RBBB. [de-identified] : \par TTE 8/2/22: limited study in the setting of an RV biopsy\par 1. Normal right ventricular size and function.\par 2. Small pericardiall effusion.\par A bioptome is noted in the right heart. No change to the size of the pericardial effusion or RV function\par 7/20/22: TTE: limited study: hyperdynamic LVSF, normal RV size and function, normal pericardium with trace to small pericardial effusion adjacent to RV\par 07/05/22 TTE: limited study: normal biV function, small loculated pericardial effusion at the LV apex, measuring approx 1.0 cm seen both pre and post images. [de-identified] : \par 6/28: RA 9, RV 3/11, PA 62/27/41, wedge 21 with v wave 31, PA sat 69.3%, /81/102, CO/Ci 6.87/3.62, grade 1R/2\par 7/5: RA 2, RV 35/3, PA 36/18/26, Wedge 15, PA sat 71.7, Kristy CO/CI 5.4/2.8, /75/93, SVR 1345 dsc, 1R/1A\par 7/13: RA 3, PAP 36/14/24 PCW 8, Pa sat 71%, Kristy CO/CI 6.1/3.2, EMBx ISHLT Grade 1R/1A\par 7/20: RA 3 PAP 47/20/30 PCW 15 PaSat 67% Kristy CO/CI 5.9/3.2 EMBx ISHLT Grade 1R/1A\par 8/2: RA 4, PAP 35/16/25 PCW 12 Pasat 68% Kristy CO/CI 5.3/2.9, EMBx ISHLT Grade 1R/1A

## 2022-08-16 NOTE — END OF VISIT
[FreeTextEntry3] : Patient seen and examined with Maricruz Noonan NP. \par \par He is doing well overall and euvolemic/dry on exam. His hemodynamics at time of cath revealed normal filling pressures and mild postcapillary pulmonary hypertension with normal cardiac output. He was hypertensive at the time of cath but BP well controlled at home. Normal graft function on TTE. Cr mildly elevated at 1.37, advised to increase PO hydration at home. Tacrolimus trough is pending. Will follow up with Dr. Nuñez at next biopsy.  [Time Spent: ___ minutes] : I have spent [unfilled] minutes of time on the encounter.

## 2022-08-16 NOTE — HISTORY OF PRESENT ILLNESS
[FreeTextEntry1] : 65 YO M with a history of ACC/AHA Stage D mixed NICM/ICM (likely familial with strong FH and early arrhythmia history in his 30's) with LVED 5.2 cm and LVEF 10-15% s/p PPM upgraded to CRT-D, CAD s/p PCI to mLAD 4/2022, well controlled DM2 (A1c 6.2%), and CKD III (Cr 1.4) and VT who is now s/p OHT on 6/21/22 (ischemic time 261 mins, CMV +/+, Toxo -/-).\par \par Pt  initially presented to [facility] 5/20 with near syncope in setting of worsening HF symptoms and found to have 41 episodes of VT, many terminating with ATP. LHC did not reveal new obstructive CAD and he underwent EPS which did not reveal endocardial substrate amenable for ablation. RHC revealed severely depressed cardiac output and he was transferred to [facility] 5/26 for advanced therapies evaluation. IABP was placed and he was listed UNOS status 2e for OHT. He was briefly made status 7 as he became febrile without leukocytosis, then bld cx negative x48hrs he was re-activated UNOS status 2e. \par \par A suitable donor was identified and patient underwent OHT with Dr. Earl/Maria C on 6/21/22 (ischemic time 261 mins, CMV +/+, Toxo -/-). Patient was successfully extubated and IABP was removed on POD 1.  Cultures from the ICD site in the OR were positive for staph epidermidis/morganella morganii and he was treated with IV Vancomycin and Cefepime. CT scan demonstrated residual fibrous capsule in the region of the previous cardiac device in the left chest wall. Pt was then discharged home on oral antibiotics on 7/8/22.\par \par Pt is here today for f/u visit and biopsy #6. Since last seen, he reports feeling well and having more solid stools now. Reports intermittent b/l hand tremors.  Pt taking short walks.  He denies CP, SOB at rest, HSIEH, orthopnea, PND, LH/dizziness, abdominal discomfort, palpitations, and syncope. No headache or tremor. No fevers or chills. His appetite is normal. Home vital signs logs reviewed and average -120's/60-90's, FS mostly 100-150mg/dL.

## 2022-08-16 NOTE — ASU DISCHARGE PLAN (ADULT/PEDIATRIC) - CARE PROVIDER_API CALL
Candice Anderson)  Internal Medicine  [address] [address]  Phone: [phone]  Fax: [phone]  Established Patient  Follow Up Time:

## 2022-08-17 ENCOUNTER — NON-APPOINTMENT (OUTPATIENT)
Age: 67
End: 2022-08-17

## 2022-08-17 RX ORDER — NYSTATIN 100000 [USP'U]/ML
100000 SUSPENSION ORAL 4 TIMES DAILY
Qty: 600 | Refills: 5 | Status: DISCONTINUED | COMMUNITY
Start: 2022-06-29 | End: 2022-08-17

## 2022-08-24 ENCOUNTER — OUTPATIENT (OUTPATIENT)
Dept: OUTPATIENT SERVICES | Facility: HOSPITAL | Age: 67
LOS: 1 days | End: 2022-08-24
Payer: MEDICAID

## 2022-08-24 ENCOUNTER — APPOINTMENT (OUTPATIENT)
Dept: ULTRASOUND IMAGING | Facility: CLINIC | Age: 67
End: 2022-08-24

## 2022-08-24 DIAGNOSIS — Z95.0 PRESENCE OF CARDIAC PACEMAKER: Chronic | ICD-10-CM

## 2022-08-24 DIAGNOSIS — Z98.890 OTHER SPECIFIED POSTPROCEDURAL STATES: Chronic | ICD-10-CM

## 2022-08-24 DIAGNOSIS — I82.409 ACUTE EMBOLISM AND THROMBOSIS OF UNSPECIFIED DEEP VEINS OF UNSPECIFIED LOWER EXTREMITY: ICD-10-CM

## 2022-08-24 PROCEDURE — 93970 EXTREMITY STUDY: CPT

## 2022-08-24 PROCEDURE — 93970 EXTREMITY STUDY: CPT | Mod: 26

## 2022-08-31 LAB
ALBUMIN SERPL ELPH-MCNC: 4.9 G/DL
ALP BLD-CCNC: 50 U/L
ALT SERPL-CCNC: 16 U/L
ANION GAP SERPL CALC-SCNC: 16 MMOL/L
AST SERPL-CCNC: 18 U/L
BASOPHILS # BLD AUTO: 0.06 K/UL
BASOPHILS NFR BLD AUTO: 0.6 %
BILIRUB SERPL-MCNC: 0.9 MG/DL
BUN SERPL-MCNC: 29 MG/DL
CALCIUM SERPL-MCNC: 10.4 MG/DL
CHLORIDE SERPL-SCNC: 102 MMOL/L
CO2 SERPL-SCNC: 21 MMOL/L
CREAT SERPL-MCNC: 1.38 MG/DL
EGFR: 57 ML/MIN/1.73M2
EOSINOPHIL # BLD AUTO: 0.03 K/UL
EOSINOPHIL NFR BLD AUTO: 0.3 %
GLUCOSE SERPL-MCNC: 57 MG/DL
HCT VFR BLD CALC: 43.5 %
HGB BLD-MCNC: 14.2 G/DL
IMM GRANULOCYTES NFR BLD AUTO: 0.9 %
LYMPHOCYTES # BLD AUTO: 3.2 K/UL
LYMPHOCYTES NFR BLD AUTO: 34.1 %
MAGNESIUM SERPL-MCNC: 1.6 MG/DL
MAN DIFF?: NORMAL
MCHC RBC-ENTMCNC: 31.3 PG
MCHC RBC-ENTMCNC: 32.6 GM/DL
MCV RBC AUTO: 96 FL
MONOCYTES # BLD AUTO: 0.21 K/UL
MONOCYTES NFR BLD AUTO: 2.2 %
NEUTROPHILS # BLD AUTO: 5.81 K/UL
NEUTROPHILS NFR BLD AUTO: 61.9 %
PLATELET # BLD AUTO: 259 K/UL
POTASSIUM SERPL-SCNC: 4.3 MMOL/L
PROT SERPL-MCNC: 6.9 G/DL
RBC # BLD: 4.53 M/UL
RBC # FLD: 18.5 %
SODIUM SERPL-SCNC: 140 MMOL/L
TACROLIMUS SERPL-MCNC: 21.7 NG/ML
WBC # FLD AUTO: 9.39 K/UL

## 2022-09-01 ENCOUNTER — APPOINTMENT (OUTPATIENT)
Dept: ENDOCRINOLOGY | Facility: CLINIC | Age: 67
End: 2022-09-01

## 2022-09-06 LAB
ALBUMIN SERPL ELPH-MCNC: 4.8 G/DL
ALP BLD-CCNC: 49 U/L
ALT SERPL-CCNC: 11 U/L
ANION GAP SERPL CALC-SCNC: 14 MMOL/L
AST SERPL-CCNC: 16 U/L
BILIRUB SERPL-MCNC: 0.6 MG/DL
BUN SERPL-MCNC: 30 MG/DL
CALCIUM SERPL-MCNC: 10.2 MG/DL
CHLORIDE SERPL-SCNC: 104 MMOL/L
CO2 SERPL-SCNC: 23 MMOL/L
CREAT SERPL-MCNC: 1.44 MG/DL
EGFR: 54 ML/MIN/1.73M2
GLUCOSE SERPL-MCNC: 85 MG/DL
POTASSIUM SERPL-SCNC: 4.3 MMOL/L
PROT SERPL-MCNC: 6.9 G/DL
SODIUM SERPL-SCNC: 140 MMOL/L
TACROLIMUS SERPL-MCNC: 21.5 NG/ML

## 2022-09-07 LAB
ALBUMIN SERPL ELPH-MCNC: 4.5 G/DL
ALP BLD-CCNC: 49 U/L
ALT SERPL-CCNC: 15 U/L
ANION GAP SERPL CALC-SCNC: 15 MMOL/L
AST SERPL-CCNC: 15 U/L
BASOPHILS # BLD AUTO: 0.03 K/UL
BASOPHILS NFR BLD AUTO: 0.5 %
BILIRUB SERPL-MCNC: 0.6 MG/DL
BUN SERPL-MCNC: 26 MG/DL
CALCIUM SERPL-MCNC: 10.3 MG/DL
CHLORIDE SERPL-SCNC: 103 MMOL/L
CO2 SERPL-SCNC: 22 MMOL/L
CREAT SERPL-MCNC: 1.36 MG/DL
EGFR: 58 ML/MIN/1.73M2
EOSINOPHIL # BLD AUTO: 0.04 K/UL
EOSINOPHIL NFR BLD AUTO: 0.6 %
GLUCOSE SERPL-MCNC: 86 MG/DL
HCT VFR BLD CALC: 43.2 %
HGB BLD-MCNC: 14.4 G/DL
IMM GRANULOCYTES NFR BLD AUTO: 3.1 %
LYMPHOCYTES # BLD AUTO: 2.65 K/UL
LYMPHOCYTES NFR BLD AUTO: 41.7 %
MAGNESIUM SERPL-MCNC: 1.3 MG/DL
MAN DIFF?: NORMAL
MCHC RBC-ENTMCNC: 30.9 PG
MCHC RBC-ENTMCNC: 33.3 GM/DL
MCV RBC AUTO: 92.7 FL
MONOCYTES # BLD AUTO: 0.13 K/UL
MONOCYTES NFR BLD AUTO: 2 %
NEUTROPHILS # BLD AUTO: 3.3 K/UL
NEUTROPHILS NFR BLD AUTO: 52.1 %
PLATELET # BLD AUTO: 268 K/UL
POTASSIUM SERPL-SCNC: 4.4 MMOL/L
PROT SERPL-MCNC: 6.9 G/DL
RBC # BLD: 4.66 M/UL
RBC # FLD: 17.2 %
SODIUM SERPL-SCNC: 141 MMOL/L
TACROLIMUS SERPL-MCNC: 13.8 NG/ML
WBC # FLD AUTO: 6.35 K/UL

## 2022-09-20 ENCOUNTER — APPOINTMENT (OUTPATIENT)
Dept: CARDIOLOGY | Facility: CLINIC | Age: 67
End: 2022-09-20

## 2022-09-20 ENCOUNTER — NON-APPOINTMENT (OUTPATIENT)
Age: 67
End: 2022-09-20

## 2022-09-20 LAB
ALBUMIN SERPL ELPH-MCNC: 4.6 G/DL
ALP BLD-CCNC: 51 U/L
ALT SERPL-CCNC: 17 U/L
ANION GAP SERPL CALC-SCNC: 14 MMOL/L
AST SERPL-CCNC: 15 U/L
BASOPHILS # BLD AUTO: 0.05 K/UL
BASOPHILS NFR BLD AUTO: 1.1 %
BILIRUB SERPL-MCNC: 0.8 MG/DL
BUN SERPL-MCNC: 23 MG/DL
CALCIUM SERPL-MCNC: 10.2 MG/DL
CHLORIDE SERPL-SCNC: 106 MMOL/L
CO2 SERPL-SCNC: 22 MMOL/L
CREAT SERPL-MCNC: 1.22 MG/DL
EGFR: 66 ML/MIN/1.73M2
EOSINOPHIL # BLD AUTO: 0.08 K/UL
EOSINOPHIL NFR BLD AUTO: 1.7 %
GLUCOSE SERPL-MCNC: 95 MG/DL
HCT VFR BLD CALC: 43.4 %
HGB BLD-MCNC: 14.8 G/DL
IMM GRANULOCYTES NFR BLD AUTO: 3.8 %
LYMPHOCYTES # BLD AUTO: 2.49 K/UL
LYMPHOCYTES NFR BLD AUTO: 52.5 %
MAGNESIUM SERPL-MCNC: 1.7 MG/DL
MAN DIFF?: NORMAL
MCHC RBC-ENTMCNC: 31.9 PG
MCHC RBC-ENTMCNC: 34.1 GM/DL
MCV RBC AUTO: 93.5 FL
MONOCYTES # BLD AUTO: 0.13 K/UL
MONOCYTES NFR BLD AUTO: 2.7 %
NEUTROPHILS # BLD AUTO: 1.81 K/UL
NEUTROPHILS NFR BLD AUTO: 38.2 %
PLATELET # BLD AUTO: 246 K/UL
POTASSIUM SERPL-SCNC: 4.1 MMOL/L
PROT SERPL-MCNC: 6.9 G/DL
RBC # BLD: 4.64 M/UL
RBC # FLD: 15.4 %
SODIUM SERPL-SCNC: 142 MMOL/L
TACROLIMUS SERPL-MCNC: 16.6 NG/ML
WBC # FLD AUTO: 4.74 K/UL

## 2022-09-22 ENCOUNTER — TRANSCRIPTION ENCOUNTER (OUTPATIENT)
Age: 67
End: 2022-09-22

## 2022-09-22 ENCOUNTER — OUTPATIENT (OUTPATIENT)
Dept: OUTPATIENT SERVICES | Facility: HOSPITAL | Age: 67
LOS: 1 days | End: 2022-09-22
Payer: MEDICAID

## 2022-09-22 ENCOUNTER — APPOINTMENT (OUTPATIENT)
Dept: HEART FAILURE | Facility: CLINIC | Age: 67
End: 2022-09-22

## 2022-09-22 ENCOUNTER — APPOINTMENT (OUTPATIENT)
Dept: CV DIAGNOSITCS | Facility: HOSPITAL | Age: 67
End: 2022-09-22

## 2022-09-22 ENCOUNTER — RESULT REVIEW (OUTPATIENT)
Age: 67
End: 2022-09-22

## 2022-09-22 VITALS
HEART RATE: 101 BPM | RESPIRATION RATE: 16 BRPM | WEIGHT: 148 LBS | OXYGEN SATURATION: 97 % | HEIGHT: 70 IN | SYSTOLIC BLOOD PRESSURE: 143 MMHG | DIASTOLIC BLOOD PRESSURE: 98 MMHG | BODY MASS INDEX: 21.19 KG/M2 | TEMPERATURE: 98.6 F

## 2022-09-22 VITALS
WEIGHT: 147.93 LBS | OXYGEN SATURATION: 98 % | RESPIRATION RATE: 16 BRPM | TEMPERATURE: 99 F | DIASTOLIC BLOOD PRESSURE: 98 MMHG | HEIGHT: 70 IN | SYSTOLIC BLOOD PRESSURE: 143 MMHG | HEART RATE: 101 BPM

## 2022-09-22 VITALS
DIASTOLIC BLOOD PRESSURE: 89 MMHG | OXYGEN SATURATION: 97 % | SYSTOLIC BLOOD PRESSURE: 135 MMHG | RESPIRATION RATE: 16 BRPM | HEART RATE: 95 BPM

## 2022-09-22 DIAGNOSIS — Z98.890 OTHER SPECIFIED POSTPROCEDURAL STATES: Chronic | ICD-10-CM

## 2022-09-22 DIAGNOSIS — Z95.0 PRESENCE OF CARDIAC PACEMAKER: Chronic | ICD-10-CM

## 2022-09-22 DIAGNOSIS — Z94.1 HEART TRANSPLANT STATUS: ICD-10-CM

## 2022-09-22 LAB
ALBUMIN SERPL ELPH-MCNC: 6.5 G/DL — HIGH (ref 3.3–5)
ALP SERPL-CCNC: 57 U/L — SIGNIFICANT CHANGE UP (ref 40–120)
ALT FLD-CCNC: 17 U/L — SIGNIFICANT CHANGE UP (ref 10–45)
ANION GAP SERPL CALC-SCNC: 12 MMOL/L — SIGNIFICANT CHANGE UP (ref 5–17)
AST SERPL-CCNC: 17 U/L — SIGNIFICANT CHANGE UP (ref 10–40)
BASOPHILS # BLD AUTO: 0.05 K/UL — SIGNIFICANT CHANGE UP (ref 0–0.2)
BASOPHILS NFR BLD AUTO: 1.1 % — SIGNIFICANT CHANGE UP (ref 0–2)
BILIRUB SERPL-MCNC: 0.9 MG/DL — SIGNIFICANT CHANGE UP (ref 0.2–1.2)
BUN SERPL-MCNC: 24 MG/DL — HIGH (ref 7–23)
CALCIUM SERPL-MCNC: 10.5 MG/DL — SIGNIFICANT CHANGE UP (ref 8.4–10.5)
CHLORIDE SERPL-SCNC: 102 MMOL/L — SIGNIFICANT CHANGE UP (ref 96–108)
CO2 SERPL-SCNC: 27 MMOL/L — SIGNIFICANT CHANGE UP (ref 22–31)
CREAT SERPL-MCNC: 1.3 MG/DL — SIGNIFICANT CHANGE UP (ref 0.5–1.3)
EGFR: 61 ML/MIN/1.73M2 — SIGNIFICANT CHANGE UP
EOSINOPHIL # BLD AUTO: 0.08 K/UL — SIGNIFICANT CHANGE UP (ref 0–0.5)
EOSINOPHIL NFR BLD AUTO: 1.7 % — SIGNIFICANT CHANGE UP (ref 0–6)
GLUCOSE SERPL-MCNC: 89 MG/DL — SIGNIFICANT CHANGE UP (ref 70–99)
HCT VFR BLD CALC: 45.1 % — SIGNIFICANT CHANGE UP (ref 39–50)
HGB BLD-MCNC: 15 G/DL — SIGNIFICANT CHANGE UP (ref 13–17)
IMM GRANULOCYTES NFR BLD AUTO: 4.1 % — HIGH (ref 0–0.9)
LYMPHOCYTES # BLD AUTO: 2.1 K/UL — SIGNIFICANT CHANGE UP (ref 1–3.3)
LYMPHOCYTES # BLD AUTO: 45.8 % — HIGH (ref 13–44)
MAGNESIUM SERPL-MCNC: 1.7 MG/DL — SIGNIFICANT CHANGE UP (ref 1.6–2.6)
MCHC RBC-ENTMCNC: 31.8 PG — SIGNIFICANT CHANGE UP (ref 27–34)
MCHC RBC-ENTMCNC: 33.3 GM/DL — SIGNIFICANT CHANGE UP (ref 32–36)
MCV RBC AUTO: 95.6 FL — SIGNIFICANT CHANGE UP (ref 80–100)
MONOCYTES # BLD AUTO: 0.1 K/UL — SIGNIFICANT CHANGE UP (ref 0–0.9)
MONOCYTES NFR BLD AUTO: 2.2 % — SIGNIFICANT CHANGE UP (ref 2–14)
NEUTROPHILS # BLD AUTO: 2.07 K/UL — SIGNIFICANT CHANGE UP (ref 1.8–7.4)
NEUTROPHILS NFR BLD AUTO: 45.1 % — SIGNIFICANT CHANGE UP (ref 43–77)
NRBC # BLD: 0 /100 WBCS — SIGNIFICANT CHANGE UP (ref 0–0)
PLATELET # BLD AUTO: 225 K/UL — SIGNIFICANT CHANGE UP (ref 150–400)
POTASSIUM SERPL-MCNC: 3.9 MMOL/L — SIGNIFICANT CHANGE UP (ref 3.5–5.3)
POTASSIUM SERPL-SCNC: 3.9 MMOL/L — SIGNIFICANT CHANGE UP (ref 3.5–5.3)
PROT SERPL-MCNC: 7.8 G/DL — SIGNIFICANT CHANGE UP (ref 6–8.3)
RBC # BLD: 4.72 M/UL — SIGNIFICANT CHANGE UP (ref 4.2–5.8)
RBC # FLD: 15.5 % — HIGH (ref 10.3–14.5)
SODIUM SERPL-SCNC: 141 MMOL/L — SIGNIFICANT CHANGE UP (ref 135–145)
TACROLIMUS SERPL-MCNC: 8.3 NG/ML — SIGNIFICANT CHANGE UP
WBC # BLD: 4.59 K/UL — SIGNIFICANT CHANGE UP (ref 3.8–10.5)
WBC # FLD AUTO: 4.59 K/UL — SIGNIFICANT CHANGE UP (ref 3.8–10.5)

## 2022-09-22 PROCEDURE — 88342 IMHCHEM/IMCYTCHM 1ST ANTB: CPT

## 2022-09-22 PROCEDURE — 80197 ASSAY OF TACROLIMUS: CPT

## 2022-09-22 PROCEDURE — 88346 IMFLUOR 1ST 1ANTB STAIN PX: CPT | Mod: 26

## 2022-09-22 PROCEDURE — 99215 OFFICE O/P EST HI 40 MIN: CPT

## 2022-09-22 PROCEDURE — 93505 ENDOMYOCARDIAL BIOPSY: CPT | Mod: 26

## 2022-09-22 PROCEDURE — 88307 TISSUE EXAM BY PATHOLOGIST: CPT

## 2022-09-22 PROCEDURE — 99152 MOD SED SAME PHYS/QHP 5/>YRS: CPT

## 2022-09-22 PROCEDURE — 93306 TTE W/DOPPLER COMPLETE: CPT

## 2022-09-22 PROCEDURE — 88307 TISSUE EXAM BY PATHOLOGIST: CPT | Mod: 26

## 2022-09-22 PROCEDURE — 93306 TTE W/DOPPLER COMPLETE: CPT | Mod: 26

## 2022-09-22 PROCEDURE — C1889: CPT

## 2022-09-22 PROCEDURE — 93010 ELECTROCARDIOGRAM REPORT: CPT

## 2022-09-22 PROCEDURE — 93005 ELECTROCARDIOGRAM TRACING: CPT

## 2022-09-22 PROCEDURE — 83735 ASSAY OF MAGNESIUM: CPT

## 2022-09-22 PROCEDURE — C1894: CPT

## 2022-09-22 PROCEDURE — 88342 IMHCHEM/IMCYTCHM 1ST ANTB: CPT | Mod: 26

## 2022-09-22 PROCEDURE — 80053 COMPREHEN METABOLIC PANEL: CPT

## 2022-09-22 PROCEDURE — 93505 ENDOMYOCARDIAL BIOPSY: CPT

## 2022-09-22 PROCEDURE — 85025 COMPLETE CBC W/AUTO DIFF WBC: CPT

## 2022-09-22 PROCEDURE — 88350 IMFLUOR EA ADDL 1ANTB STN PX: CPT

## 2022-09-22 RX ORDER — TACROLIMUS 0.5 MG/1
0.5 CAPSULE ORAL DAILY
Qty: 90 | Refills: 3 | Status: DISCONTINUED | COMMUNITY
Start: 2022-09-21 | End: 2022-09-22

## 2022-09-22 NOTE — ASU DISCHARGE PLAN (ADULT/PEDIATRIC) - CARE PROVIDER_API CALL
Vivian Nuñez; PhD)  Adv Heart Fail Trnsplnt Cardio; Cardiovascular Disease; Internal Medicine  [address] [place] [address]  Phone: [phone]  Fax: [phone]  Established Patient  Follow Up Time: 2 weeks

## 2022-09-22 NOTE — ASU PATIENT PROFILE, ADULT - FALL HARM RISK - UNIVERSAL INTERVENTIONS
Bed in lowest position, wheels locked, appropriate side rails in place/Call bell, personal items and telephone in reach/Instruct patient to call for assistance before getting out of bed or chair/Non-slip footwear when patient is out of bed/[place] to call system/Physically safe environment - no spills, clutter or unnecessary equipment/Purposeful Proactive Rounding/Room/bathroom lighting operational, light cord in reach

## 2022-09-22 NOTE — ASU DISCHARGE PLAN (ADULT/PEDIATRIC) - NS MD DC FALL RISK RISK
For information on Fall & Injury Prevention, visit: [email or web address] OR  [email or web address] OR  https://www.cdc.gov/steadi/patient.html

## 2022-09-23 LAB
CMV DNA CSF QL NAA+PROBE: SIGNIFICANT CHANGE UP
CMV DNA SPEC NAA+PROBE-LOG#: SIGNIFICANT CHANGE UP LOG10IU/ML

## 2022-09-26 LAB — SURGICAL PATHOLOGY STUDY: SIGNIFICANT CHANGE UP

## 2022-09-29 LAB
ALBUMIN SERPL ELPH-MCNC: 4.9 G/DL
ALP BLD-CCNC: 53 U/L
ALT SERPL-CCNC: 18 U/L
ANION GAP SERPL CALC-SCNC: 14 MMOL/L
AST SERPL-CCNC: 18 U/L
BASOPHILS # BLD AUTO: 0.06 K/UL
BASOPHILS NFR BLD AUTO: 1.5 %
BILIRUB SERPL-MCNC: 0.8 MG/DL
BUN SERPL-MCNC: 26 MG/DL
CALCIUM SERPL-MCNC: 10.5 MG/DL
CHLORIDE SERPL-SCNC: 105 MMOL/L
CO2 SERPL-SCNC: 24 MMOL/L
CREAT SERPL-MCNC: 1.16 MG/DL
EGFR: 70 ML/MIN/1.73M2
EOSINOPHIL # BLD AUTO: 0.07 K/UL
EOSINOPHIL NFR BLD AUTO: 1.7 %
GLUCOSE SERPL-MCNC: 86 MG/DL
HCT VFR BLD CALC: 45.2 %
HGB BLD-MCNC: 15.2 G/DL
IMM GRANULOCYTES NFR BLD AUTO: 2.4 %
LYMPHOCYTES # BLD AUTO: 2.17 K/UL
LYMPHOCYTES NFR BLD AUTO: 52.7 %
MAGNESIUM SERPL-MCNC: 1.7 MG/DL
MAN DIFF?: NORMAL
MCHC RBC-ENTMCNC: 31.5 PG
MCHC RBC-ENTMCNC: 33.6 GM/DL
MCV RBC AUTO: 93.8 FL
MONOCYTES # BLD AUTO: 0.12 K/UL
MONOCYTES NFR BLD AUTO: 2.9 %
NEUTROPHILS # BLD AUTO: 1.6 K/UL
NEUTROPHILS NFR BLD AUTO: 38.8 %
PLATELET # BLD AUTO: 250 K/UL
POTASSIUM SERPL-SCNC: 4.4 MMOL/L
PROT SERPL-MCNC: 7 G/DL
RBC # BLD: 4.82 M/UL
RBC # FLD: 14.5 %
SODIUM SERPL-SCNC: 142 MMOL/L
TACROLIMUS SERPL-MCNC: 13.3 NG/ML
WBC # FLD AUTO: 4.12 K/UL

## 2022-10-03 NOTE — CARDIOLOGY SUMMARY
[de-identified] : \par 9/22/22 ECG: , RBBB,when compared to EKG 8/2/22 no significant changes\par 8/2/22 ECG: NSR 95bpm, RBBB, when compared to EKG 7/20/22 no significant changes\par 7/20/22 ECG: NSR at 100bpm RBBB\par 7/13/22 ECG: NSR at 94 bpm, RBBB. [de-identified] : \par TTE 8/2/22: limited study in the setting of an RV biopsy\par 1. Normal right ventricular size and function.\par 2. Small pericardiall effusion.\par A bioptome is noted in the right heart. No change to the size of the pericardial effusion or RV function\par 7/20/22: TTE: limited study: hyperdynamic LVSF, normal RV size and function, normal pericardium with trace to small pericardial effusion adjacent to RV\par 07/05/22 TTE: limited study: normal biV function, small loculated pericardial effusion at the LV apex, measuring approx 1.0 cm seen both pre and post images. [de-identified] : \par 6/28: RA 9, RV 3/11, PA 62/27/41, wedge 21 with v wave 31, PA sat 69.3%, /81/102, CO/Ci 6.87/3.62, grade 1R/2\par 7/5: RA 2, RV 35/3, PA 36/18/26, Wedge 15, PA sat 71.7, Kristy CO/CI 5.4/2.8, /75/93, SVR 1345 dsc, 1R/1A\par 7/13: RA 3, PAP 36/14/24 PCW 8, Pa sat 71%, Kristy CO/CI 6.1/3.2, EMBx ISHLT Grade 1R/1A\par 7/20: RA 3 PAP 47/20/30 PCW 15 PaSat 67% Kristy CO/CI 5.9/3.2 EMBx ISHLT Grade 1R/1A\par 8/2: RA 4, PAP 35/16/25 PCW 12 Pasat 68% Kristy CO/CI 5.3/2.9, EMBx ISHLT Grade 1R/1A

## 2022-10-03 NOTE — DISCUSSION/SUMMARY
[Patient] : the patient [___ Month(s)] : in [unfilled] month(s) [FreeTextEntry2] : and his wife [FreeTextEntry1] : 65 YO M with a history of ACC/AHA Stage D mixed NICM/ICM (likely familial with strong FH and early arrhythmia history in his 30's) with LVED 5.2 cm and LVEF 10-15% s/p PPM upgraded to CRT-D, CAD s/p PCI to mLAD 4/2022, well controlled DM2 (A1c 6.2%), and CKD III (Cr 1.4) and VT who is now s/p OHT on 6/21/22 (ischemic time 261 mins, CMV +/+, Toxo -/-), doing clinically well.\par \par #s/p OHT\par - continue ASA 81mg daily and rosuvastatin 5mg daily for prevention TCAD\par - RHC showed normal hemodynamics and EMBx Grade pending\par - continue sildenafil 10mg TID\par - f/u heartcare\par - referral to cardiac rehab\par \par #Immunosuppression\par - continue prograf for goal trough 10-12 now that pt is 3 months post. Trough today= 8.3. Increase dose from 1mg q am and 0.5mg q pm to 1mg bid. Reinforced importance of proper timing of medications and ingest food.\par - on reduced dose cellcept to 750mg bid in the setting of pt having looser stools and decreased WBC\par - taper prednisone per protocol\par \par #prophylaxis\par - CMV +/+: continue valcyte 450mg BID. CMV PCR today pending\par - Toxo -/-: no ppx needed\par - PJP: continue mepron 1500mg daily (bactrim was not trialed due to hyperkalemia and borderline elevated SCr)\par - thrush: continue nystatin S&S qid until prednisone <10mg daily\par \par #ICD pocket +staph epidermiditis/morganella morganii\par - leukocytosis resolved\par - completed Cefpodoxime through 7/15\par - completed Doxyclycine through 7/21\par - per discharge plans, if WBC continue to remain elevated will need to consider bringing patient back to OR for ICD pocket washout\par - CT C/A/P 7/5 showed residual fibrous capsule in the region of the previous cardiac device in the left chest wall. 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 Pt missed vascular appt, reschedule now\par \par #CKD stable, SCr ~1.2, eGFR 66\par - needs f/u with Dr. Jaramillo \par - avoid nephrotoxic agents\par - remain off diuretics\par \par #HTN\par - increase nifedipine from 30 to 60mg daily\par - pt to maintain home BP log and notify our team if >140/90\par \par #hyperglycemia\par - followed by endocrine, appreciate recs\par - continue metformin 1000mg bid and levemir 6units qhs \par - continue to monitor FS \par \par #Health maintenance\par - continue mag oxide and MVI\par - continue calcium citrate + vitamin D\par - continue PPI\par \par RTC/bx +/- allomap in one month

## 2022-10-03 NOTE — END OF VISIT
[FreeTextEntry3] : I, Dr. Nuñez, personally performed the evaluation and management (E/M) services for this established patient who presents today with (a) new problem(s)/exacerbation of (an) existing condition(s).  That E/M includes conducting the examination, assessing all new/exacerbated conditions, and establishing a new plan of care.  Today, my REBECA, [employer], was here to observe my evaluation and management services for this new problem/exacerbated condition to be followed going forward.  [Time Spent: ___ minutes] : I have spent [unfilled] minutes of time on the encounter.

## 2022-10-03 NOTE — PHYSICAL EXAM
[No Xanthelasma] : no xanthelasma [Normal Venous Pressure] : normal venous pressure [Normal Radial B/L] : normal radial B/L [Normal] : alert and oriented, normal memory [de-identified] : not assessed - wearing a mask [de-identified] : JVP < 6 cm H2O, no HJR [de-identified] : +midsternal chest incision well healed, left chest AICD explant site C/D/I

## 2022-10-03 NOTE — HISTORY OF PRESENT ILLNESS
[FreeTextEntry1] : 65 YO M with a history of ACC/AHA Stage D mixed NICM/ICM (likely familial with strong FH and early arrhythmia history in his 30's) with LVED 5.2 cm and LVEF 10-15% s/p PPM upgraded to CRT-D, CAD s/p PCI to mLAD 4/2022, well controlled DM2 (A1c 6.2%), and CKD III (Cr 1.4) and VT who is now s/p OHT on 6/21/22 (ischemic time 261 mins, CMV +/+, Toxo -/-).\par \par Pt  initially presented to [facility] 5/20 with near syncope in setting of worsening HF symptoms and found to have 41 episodes of VT, many terminating with ATP. LHC did not reveal new obstructive CAD and he underwent EPS which did not reveal endocardial substrate amenable for ablation. RHC revealed severely depressed cardiac output and he was transferred to [facility] 5/26 for advanced therapies evaluation. IABP was placed and he was listed UNOS status 2e for OHT. He was briefly made status 7 as he became febrile without leukocytosis, then bld cx negative x48hrs he was re-activated UNOS status 2e. \par \par A suitable donor was identified and patient underwent OHT with Dr. Earl/Maria C on 6/21/22 (ischemic time 261 mins, CMV +/+, Toxo -/-). Patient was successfully extubated and IABP was removed on POD 1.  Cultures from the ICD site in the OR were positive for staph epidermidis/morganella morganii and he was treated with IV Vancomycin and Cefepime. CT scan demonstrated residual fibrous capsule in the region of the previous cardiac device in the left chest wall. Pt was then discharged home on oral antibiotics on 7/8/22.\par \par Pt is here today for f/u visit and biopsy #7. Since last seen, he reports feeling well but still not much energy. He's walking 2 blocks every other day otherwise staying home.  Reports b/l hand tremors have lessened since prograf dose was adjusted 2 days ago.  He denies CP, SOB at rest, HSIEH, orthopnea, PND, LH/dizziness, abdominal discomfort, palpitations, and syncope. No headache or tremor. No fevers or chills. His appetite is normal. Home vital signs logs reviewed and average -120's/'s, FS mostly 100-120mg/dL.

## 2022-10-04 ENCOUNTER — APPOINTMENT (OUTPATIENT)
Dept: CARDIOLOGY | Facility: CLINIC | Age: 67
End: 2022-10-04

## 2022-10-04 ENCOUNTER — NON-APPOINTMENT (OUTPATIENT)
Age: 67
End: 2022-10-04

## 2022-10-04 NOTE — PLAN
[FreeTextEntry1] : Cardiac Rehab Phase 2\par Discussed program; all questions answered.\par ITP initiated.\par \par \par \par Start date: 12/8/22 (Patient's wife stated patient to have new managed medicare insurance on 12/7/22- administrative support team made aware. Focus PE to take place at session #1.\par \par CR phone number made available for questions/concerns.\par Welcome packet mailed.\par \par Time spent with patient: 35 minutes\par

## 2022-10-04 NOTE — REVIEW OF SYSTEMS
[Fatigue] : fatigue [Recent Change In Weight] : ~T recent weight change [Negative] : Integumentary [Fever] : no fever [Chills] : no chills [Earache] : no earache [Chest Pain] : no chest pain [Palpitations] : no palpitations [Claudication] : no  leg claudication [Lower Ext Edema] : no lower extremity edema [Orthopena] : no orthopnea [Paroxysmal Nocturnal Dyspnea] : no paroxysmal nocturnal dyspnea [Abdominal Pain] : no abdominal pain [Nausea] : no nausea [Constipation] : no constipation [Diarrhea] : no diarrhea [Vomiting] : no vomiting [Heartburn] : no heartburn [Nocturia] : no nocturia [Itching] : no itching [Skin Rash] : no skin rash [Headache] : no headache [Dizziness] : no dizziness [Fainting] : no fainting [Confusion] : no confusion [Unsteady Walk] : no ataxia [Anxiety] : no anxiety [Depression] : no depression [FreeTextEntry8] : nocturia x1 [FreeTextEntry9] : right knee with discomfort while climbin upstairs [de-identified] : incision healed well as per patient

## 2022-10-04 NOTE — CARDIOLOGY SUMMARY
[de-identified] : \par 9/22/22 ECG: , RBBB,when compared to EKG 8/2/22 no significant changes\par 8/2/22 ECG: NSR 95bpm, RBBB, when compared to EKG 7/20/22 no significant changes\par 7/20/22 ECG: NSR at 100bpm RBBB\par 7/13/22 ECG: NSR at 94 bpm, RBBB. [de-identified] : \par TTE 8/2/22: limited study in the setting of an RV biopsy\par 1. Normal right ventricular size and function.\par 2. Small pericardiall effusion.\par A bioptome is noted in the right heart. No change to the size of the pericardial effusion or RV function\par 7/20/22: TTE: limited study: hyperdynamic LVSF, normal RV size and function, normal pericardium with trace to small pericardial effusion adjacent to RV\par 07/05/22 TTE: limited study: normal biV function, small loculated pericardial effusion at the LV apex, measuring approx 1.0 cm seen both pre and post images. [de-identified] : \par 6/28: RA 9, RV 3/11, PA 62/27/41, wedge 21 with v wave 31, PA sat 69.3%, /81/102, CO/Ci 6.87/3.62, grade 1R/2\par 7/5: RA 2, RV 35/3, PA 36/18/26, Wedge 15, PA sat 71.7, Kristy CO/CI 5.4/2.8, /75/93, SVR 1345 dsc, 1R/1A\par 7/13: RA 3, PAP 36/14/24 PCW 8, Pa sat 71%, Kristy CO/CI 6.1/3.2, EMBx ISHLT Grade 1R/1A\par 7/20: RA 3 PAP 47/20/30 PCW 15 PaSat 67% Kristy CO/CI 5.9/3.2 EMBx ISHLT Grade 1R/1A\par 8/2: RA 4, PAP 35/16/25 PCW 12 Pasat 68% Kristy CO/CI 5.3/2.9, EMBx ISHLT Grade 1R/1A

## 2022-10-04 NOTE — HISTORY OF PRESENT ILLNESS
[Type II Diabetes] : Type II Diabetes [Is Patient aware of signs and symptoms of hypoglycemia and hyperglycemia?] : patient is aware of signs and symptoms of hypoglycemia and hyperglycemia [Following diabetes way of eating] : following diabetes way of eating [Patient will verbalize sign and symptoms of low blood sugar and high blood sugar] : Patient will verbalize sign and symptoms of low blood sugar and high blood sugar [Overview of diabetes and cardiovascular disease] : overview of diabetes and cardiovascular disease [Diagnosis of prediabetes, diabetes] : diagnosis of prediabetes, diabetes [Hypoglycemia and Hyperglycemia: sign and symptoms] : Hypoglycemia and Hyperglycemia: sign and symptoms [Role of lifestyle behaviors in diabetes management] : role of lifestyle behaviors in diabetes management [Medications to treat diabetes] : medications to treat diabetes [Yes, continue with Diabetes plan] : Yes, continue with Diabetes plan [Height: ___] : Height: [unfilled] [Monitoring of weight at home and at cardiac rehabilitation] : Monitoring of weight at home and at cardiac rehabilitation [Individualized exercise program as developed at cardiac rehabilitation] : Individualized exercise program as developed at cardiac rehabilitation [Calories and healthy weight management] : Calories and healthy weight management [Exercise and diet] : exercise and diet [Weight gain and heart failure implications] : weight gain and heart failure implications [Yes, continue with Weight Management plan] : Yes, continue with weight management plan [None] : none [Heart transplant recipient] : heart transplant recipient [Cardiac Rehabilitation] : Cardiac Rehabilitation [___ Days per week] : [unfilled] days per week [>= 31 minutes per session] : >= 31 minutes per session [Target RPE: ___] : Target RPE: [unfilled] [Treadmill] : treadmill [Recumbent bike] : recumbent bike [Upright exercise bike] : upright exercise bike [BioStep] : BioStep [Upper body ergometer] : upper body ergometer [Assess in ___ weeks] : assess in [unfilled] weeks [Stretching/ROM exercises and balance exercises daily] : Stretching/ROM exercises and balance exercises daily [Will assess for musculoskeletal and other restrictions] : will assess for musculoskeletal and other restrictions [Perform aerobic activity for ___ consecutive minutes] : perform aerobic activity for [unfilled] consecutive minutes [To start resistance training] : to start resistance training [Individualized Exercise Rx] : individualized exercise Rx [Welcome packet provided] : welcome packet provided [Yes, per exercise prescription, policy] : Yes, per exercise prescription, policy [Has the patient given CR team permission to speak with the emergency  about the patient?] : Has the patient given CR team permission to speak with the emergency  about the patient? Yes [Does patient have advance directive?] : Does patient have advance directive? Yes [Self-reports of improved psychosocial well-being] : Self-reports of improved psychosocial well-being [Discuss overview of emotional health supportive modalities] : Discuss overview of emotional health supportive modalities [Mindfulness] : mindfulness [Meditation] : meditation [Relaxation breathing techniques] : relaxation breathing techniques [Stress management] : stress management [Yes, continue with psychosocial plan] : Yes, continue with psychosocial plan [Diabetes] : Diabetes [Is patient on medication for hyperlipidemia?] : Is patient on medication for hyperlipidemia? Yes [Rosuvastatin] : rosuvastatin [Discuss an overview of healthy eating plan] : Discuss an overview of healthy eating plan [Yes, continue with nutrition plan] : Yes, continue with nutrition plan [Yes ___ How many times per day?] : Yes, [unfilled] times per day [FreeTextEntry2] : 90 [Action] : Stage of change: action [Is Patient adherent with medication?] : patient is adherent with medication [Does patient monitor blood pressure at home?] : patient monitors blood pressure at home [Low sodium diet] : low sodium diet [Other diet strategies] : other diet strategies [Moderate alcohol intake] : moderate alcohol intake [Stress management techniques] : stress management techniques [Weight management] : weight management [Patient will maintain blood pressure < 130/80 mmHg] : patient will maintain blood pressure < 130/80 mmHg [Guidelines for blood pressure management] : guidelines for blood pressure management [Define hypertension] : define hypertension [Medications] : medications [Processed food] : processed food [Eating out] : eating out [Take out] : take out [Yes, continue with Hypertension plan] : Yes, continue with Hypertension plan [In progress] : in progress [FreeTextEntry3] : Patient weight will be maintained [Hypertension] : hypertension [Diabetes Mellitus] : diabetes mellitus [Angina with exercise] : no angina with exercise [Fall Risk] : no fall risk [] : no [Other restrictions: ____] : [unfilled] [FreeTextEntry1] : Dr. Nuñez [de-identified] : exercise progression as per guidelines [Sugar] : sugar [Sodium] : sodium [Significant other] : significant other [Teach back utilized] : teach back utilized [FreeTextEntry6] : none [FreeTextEntry8] : Patient report heart healthy eating as advised by transplant team. Wife does the cooking.  [FreeTextEntry7] : Improved RYP score

## 2022-10-04 NOTE — REASON FOR VISIT
[Home] : at home, [unfilled] , at the time of the visit. [Medical Office: ([facility])___] : at the medical office located in  [Patient] : the patient [Initial Assessment] : an initial assessment [Assessment] : an assessment [Spouse] : spouse [Self] : self [FreeTextEntry1] : Clinical diagnosis for cardiac rehab: s/p OHT; Initial ITP to be finalized at session #1

## 2022-10-04 NOTE — REASON FOR VISIT
[Home] : at home, [unfilled] , at the time of the visit. [Medical Office: ([facility])___] : at the medical office located in  [Patient] : the patient [Initial Assessment] : an initial assessment [Assessment] : an assessment [Spouse] : spouse [Self] : self [FreeTextEntry1] : Clinical diagnosis for cardiac rehab: s/p OHT; Initial ITP to be finalized at session #1

## 2022-10-04 NOTE — ASSESSMENT
[FreeTextEntry1] : Patient is a 66 year old male, s/p OHT on 6/21/22, referred for cardiac rehab.\par \par Patient stable for cardiac rehab - appears to be doing well physically and emotionally.

## 2022-10-04 NOTE — HISTORY OF PRESENT ILLNESS
[Type II Diabetes] : Type II Diabetes [Is Patient aware of signs and symptoms of hypoglycemia and hyperglycemia?] : patient is aware of signs and symptoms of hypoglycemia and hyperglycemia [Following diabetes way of eating] : following diabetes way of eating [Patient will verbalize sign and symptoms of low blood sugar and high blood sugar] : Patient will verbalize sign and symptoms of low blood sugar and high blood sugar [Overview of diabetes and cardiovascular disease] : overview of diabetes and cardiovascular disease [Diagnosis of prediabetes, diabetes] : diagnosis of prediabetes, diabetes [Hypoglycemia and Hyperglycemia: sign and symptoms] : Hypoglycemia and Hyperglycemia: sign and symptoms [Role of lifestyle behaviors in diabetes management] : role of lifestyle behaviors in diabetes management [Medications to treat diabetes] : medications to treat diabetes [Yes, continue with Diabetes plan] : Yes, continue with Diabetes plan [Height: ___] : Height: [unfilled] [Monitoring of weight at home and at cardiac rehabilitation] : Monitoring of weight at home and at cardiac rehabilitation [Individualized exercise program as developed at cardiac rehabilitation] : Individualized exercise program as developed at cardiac rehabilitation [Calories and healthy weight management] : Calories and healthy weight management [Exercise and diet] : exercise and diet [Weight gain and heart failure implications] : weight gain and heart failure implications [Yes, continue with Weight Management plan] : Yes, continue with weight management plan [None] : none [Heart transplant recipient] : heart transplant recipient [Cardiac Rehabilitation] : Cardiac Rehabilitation [___ Days per week] : [unfilled] days per week [>= 31 minutes per session] : >= 31 minutes per session [Target RPE: ___] : Target RPE: [unfilled] [Treadmill] : treadmill [Recumbent bike] : recumbent bike [Upright exercise bike] : upright exercise bike [BioStep] : BioStep [Upper body ergometer] : upper body ergometer [Assess in ___ weeks] : assess in [unfilled] weeks [Stretching/ROM exercises and balance exercises daily] : Stretching/ROM exercises and balance exercises daily [Will assess for musculoskeletal and other restrictions] : will assess for musculoskeletal and other restrictions [Perform aerobic activity for ___ consecutive minutes] : perform aerobic activity for [unfilled] consecutive minutes [To start resistance training] : to start resistance training [Individualized Exercise Rx] : individualized exercise Rx [Welcome packet provided] : welcome packet provided [Yes, per exercise prescription, policy] : Yes, per exercise prescription, policy [Has the patient given CR team permission to speak with the emergency  about the patient?] : Has the patient given CR team permission to speak with the emergency  about the patient? Yes [Does patient have advance directive?] : Does patient have advance directive? Yes [Self-reports of improved psychosocial well-being] : Self-reports of improved psychosocial well-being [Discuss overview of emotional health supportive modalities] : Discuss overview of emotional health supportive modalities [Mindfulness] : mindfulness [Meditation] : meditation [Relaxation breathing techniques] : relaxation breathing techniques [Stress management] : stress management [Yes, continue with psychosocial plan] : Yes, continue with psychosocial plan [Diabetes] : Diabetes [Is patient on medication for hyperlipidemia?] : Is patient on medication for hyperlipidemia? Yes [Rosuvastatin] : rosuvastatin [Discuss an overview of healthy eating plan] : Discuss an overview of healthy eating plan [Yes, continue with nutrition plan] : Yes, continue with nutrition plan [Yes ___ How many times per day?] : Yes, [unfilled] times per day [FreeTextEntry2] : 90 [Action] : Stage of change: action [Is Patient adherent with medication?] : patient is adherent with medication [Does patient monitor blood pressure at home?] : patient monitors blood pressure at home [Low sodium diet] : low sodium diet [Other diet strategies] : other diet strategies [Moderate alcohol intake] : moderate alcohol intake [Stress management techniques] : stress management techniques [Weight management] : weight management [Patient will maintain blood pressure < 130/80 mmHg] : patient will maintain blood pressure < 130/80 mmHg [Guidelines for blood pressure management] : guidelines for blood pressure management [Define hypertension] : define hypertension [Medications] : medications [Processed food] : processed food [Eating out] : eating out [Take out] : take out [Yes, continue with Hypertension plan] : Yes, continue with Hypertension plan [In progress] : in progress [FreeTextEntry3] : Patient weight will be maintained [Hypertension] : hypertension [Diabetes Mellitus] : diabetes mellitus [Angina with exercise] : no angina with exercise [Fall Risk] : no fall risk [] : no [Other restrictions: ____] : [unfilled] [FreeTextEntry1] : Dr. Nuñez [de-identified] : exercise progression as per guidelines [Sugar] : sugar [Sodium] : sodium [Significant other] : significant other [Teach back utilized] : teach back utilized [FreeTextEntry6] : none [FreeTextEntry8] : Patient report heart healthy eating as advised by transplant team. Wife does the cooking.  [FreeTextEntry7] : Improved RYP score

## 2022-10-04 NOTE — REVIEW OF SYSTEMS
[Fatigue] : fatigue [Recent Change In Weight] : ~T recent weight change [Negative] : Integumentary [Fever] : no fever [Chills] : no chills [Earache] : no earache [Chest Pain] : no chest pain [Palpitations] : no palpitations [Claudication] : no  leg claudication [Lower Ext Edema] : no lower extremity edema [Orthopena] : no orthopnea [Paroxysmal Nocturnal Dyspnea] : no paroxysmal nocturnal dyspnea [Abdominal Pain] : no abdominal pain [Nausea] : no nausea [Constipation] : no constipation [Diarrhea] : no diarrhea [Vomiting] : no vomiting [Heartburn] : no heartburn [Nocturia] : no nocturia [Itching] : no itching [Skin Rash] : no skin rash [Headache] : no headache [Dizziness] : no dizziness [Fainting] : no fainting [Confusion] : no confusion [Unsteady Walk] : no ataxia [Anxiety] : no anxiety [Depression] : no depression [FreeTextEntry8] : nocturia x1 [FreeTextEntry9] : right knee with discomfort while climbin upstairs [de-identified] : incision healed well as per patient

## 2022-10-04 NOTE — CARDIOLOGY SUMMARY
[de-identified] : \par 9/22/22 ECG: , RBBB,when compared to EKG 8/2/22 no significant changes\par 8/2/22 ECG: NSR 95bpm, RBBB, when compared to EKG 7/20/22 no significant changes\par 7/20/22 ECG: NSR at 100bpm RBBB\par 7/13/22 ECG: NSR at 94 bpm, RBBB. [de-identified] : \par TTE 8/2/22: limited study in the setting of an RV biopsy\par 1. Normal right ventricular size and function.\par 2. Small pericardiall effusion.\par A bioptome is noted in the right heart. No change to the size of the pericardial effusion or RV function\par 7/20/22: TTE: limited study: hyperdynamic LVSF, normal RV size and function, normal pericardium with trace to small pericardial effusion adjacent to RV\par 07/05/22 TTE: limited study: normal biV function, small loculated pericardial effusion at the LV apex, measuring approx 1.0 cm seen both pre and post images. [de-identified] : \par 6/28: RA 9, RV 3/11, PA 62/27/41, wedge 21 with v wave 31, PA sat 69.3%, /81/102, CO/Ci 6.87/3.62, grade 1R/2\par 7/5: RA 2, RV 35/3, PA 36/18/26, Wedge 15, PA sat 71.7, Kristy CO/CI 5.4/2.8, /75/93, SVR 1345 dsc, 1R/1A\par 7/13: RA 3, PAP 36/14/24 PCW 8, Pa sat 71%, Kristy CO/CI 6.1/3.2, EMBx ISHLT Grade 1R/1A\par 7/20: RA 3 PAP 47/20/30 PCW 15 PaSat 67% Kristy CO/CI 5.9/3.2 EMBx ISHLT Grade 1R/1A\par 8/2: RA 4, PAP 35/16/25 PCW 12 Pasat 68% Kristy CO/CI 5.3/2.9, EMBx ISHLT Grade 1R/1A

## 2022-10-07 ENCOUNTER — RX RENEWAL (OUTPATIENT)
Age: 67
End: 2022-10-07

## 2022-10-20 ENCOUNTER — APPOINTMENT (OUTPATIENT)
Dept: CV DIAGNOSITCS | Facility: HOSPITAL | Age: 67
End: 2022-10-20

## 2022-10-20 ENCOUNTER — TRANSCRIPTION ENCOUNTER (OUTPATIENT)
Age: 67
End: 2022-10-20

## 2022-10-20 ENCOUNTER — OUTPATIENT (OUTPATIENT)
Dept: OUTPATIENT SERVICES | Facility: HOSPITAL | Age: 67
LOS: 1 days | End: 2022-10-20
Payer: MEDICAID

## 2022-10-20 ENCOUNTER — APPOINTMENT (OUTPATIENT)
Dept: HEART FAILURE | Facility: CLINIC | Age: 67
End: 2022-10-20
Payer: MEDICAID

## 2022-10-20 ENCOUNTER — RESULT REVIEW (OUTPATIENT)
Age: 67
End: 2022-10-20

## 2022-10-20 VITALS
SYSTOLIC BLOOD PRESSURE: 155 MMHG | HEIGHT: 70 IN | RESPIRATION RATE: 18 BRPM | BODY MASS INDEX: 21.05 KG/M2 | HEART RATE: 88 BPM | WEIGHT: 147 LBS | TEMPERATURE: 98.2 F | OXYGEN SATURATION: 98 % | DIASTOLIC BLOOD PRESSURE: 99 MMHG

## 2022-10-20 VITALS
DIASTOLIC BLOOD PRESSURE: 81 MMHG | SYSTOLIC BLOOD PRESSURE: 131 MMHG | RESPIRATION RATE: 16 BRPM | HEART RATE: 95 BPM | OXYGEN SATURATION: 96 %

## 2022-10-20 VITALS
HEIGHT: 70 IN | WEIGHT: 147.05 LBS | RESPIRATION RATE: 18 BRPM | TEMPERATURE: 98 F | DIASTOLIC BLOOD PRESSURE: 99 MMHG | OXYGEN SATURATION: 98 % | SYSTOLIC BLOOD PRESSURE: 155 MMHG | HEART RATE: 93 BPM

## 2022-10-20 VITALS — DIASTOLIC BLOOD PRESSURE: 75 MMHG | SYSTOLIC BLOOD PRESSURE: 127 MMHG

## 2022-10-20 DIAGNOSIS — Z94.1 HEART TRANSPLANT STATUS: ICD-10-CM

## 2022-10-20 DIAGNOSIS — Z95.0 PRESENCE OF CARDIAC PACEMAKER: Chronic | ICD-10-CM

## 2022-10-20 DIAGNOSIS — Z98.890 OTHER SPECIFIED POSTPROCEDURAL STATES: Chronic | ICD-10-CM

## 2022-10-20 LAB
ALBUMIN SERPL ELPH-MCNC: 5.1 G/DL
ALP BLD-CCNC: 55 U/L
ALT SERPL-CCNC: 16 U/L
ANION GAP SERPL CALC-SCNC: 14 MMOL/L — SIGNIFICANT CHANGE UP (ref 5–17)
ANION GAP SERPL CALC-SCNC: 15 MMOL/L
AST SERPL-CCNC: 12 U/L
BASOPHILS # BLD AUTO: 0 K/UL — SIGNIFICANT CHANGE UP (ref 0–0.2)
BASOPHILS # BLD AUTO: 0.05 K/UL
BASOPHILS NFR BLD AUTO: 0 % — SIGNIFICANT CHANGE UP (ref 0–2)
BASOPHILS NFR BLD AUTO: 1.2 %
BILIRUB SERPL-MCNC: 0.8 MG/DL
BUN SERPL-MCNC: 20 MG/DL — SIGNIFICANT CHANGE UP (ref 7–23)
BUN SERPL-MCNC: 23 MG/DL
CALCIUM SERPL-MCNC: 10 MG/DL — SIGNIFICANT CHANGE UP (ref 8.4–10.5)
CALCIUM SERPL-MCNC: 10.1 MG/DL
CHLORIDE SERPL-SCNC: 104 MMOL/L
CHLORIDE SERPL-SCNC: 105 MMOL/L — SIGNIFICANT CHANGE UP (ref 96–108)
CMV DNA SPEC QL NAA+PROBE: NOT DETECTED IU/ML
CMVPCR LOG: NOT DETECTED LOG10IU/ML
CO2 SERPL-SCNC: 21 MMOL/L — LOW (ref 22–31)
CO2 SERPL-SCNC: 22 MMOL/L
CREAT SERPL-MCNC: 1.14 MG/DL — SIGNIFICANT CHANGE UP (ref 0.5–1.3)
CREAT SERPL-MCNC: 1.19 MG/DL
EGFR: 68 ML/MIN/1.73M2
EGFR: 72 ML/MIN/1.73M2 — SIGNIFICANT CHANGE UP
EOSINOPHIL # BLD AUTO: 0.04 K/UL — SIGNIFICANT CHANGE UP (ref 0–0.5)
EOSINOPHIL # BLD AUTO: 0.07 K/UL
EOSINOPHIL NFR BLD AUTO: 1.3 % — SIGNIFICANT CHANGE UP (ref 0–6)
EOSINOPHIL NFR BLD AUTO: 1.7 %
GIANT PLATELETS BLD QL SMEAR: PRESENT — SIGNIFICANT CHANGE UP
GLUCOSE BLDC GLUCOMTR-MCNC: 91 MG/DL — SIGNIFICANT CHANGE UP (ref 70–99)
GLUCOSE SERPL-MCNC: 90 MG/DL
GLUCOSE SERPL-MCNC: 92 MG/DL — SIGNIFICANT CHANGE UP (ref 70–99)
HCT VFR BLD CALC: 42.7 % — SIGNIFICANT CHANGE UP (ref 39–50)
HCT VFR BLD CALC: 45.3 %
HGB BLD-MCNC: 14.4 G/DL — SIGNIFICANT CHANGE UP (ref 13–17)
HGB BLD-MCNC: 15 G/DL
HGB FLD-MCNC: 13.6 G/DL — SIGNIFICANT CHANGE UP (ref 12.6–17.4)
IMM GRANULOCYTES NFR BLD AUTO: 1.7 %
LYMPHOCYTES # BLD AUTO: 1.66 K/UL — SIGNIFICANT CHANGE UP (ref 1–3.3)
LYMPHOCYTES # BLD AUTO: 2.14 K/UL
LYMPHOCYTES # BLD AUTO: 49.3 % — HIGH (ref 13–44)
LYMPHOCYTES NFR BLD AUTO: 51.9 %
MAGNESIUM SERPL-MCNC: 1.8 MG/DL
MAN DIFF?: NORMAL
MANUAL SMEAR VERIFICATION: SIGNIFICANT CHANGE UP
MCHC RBC-ENTMCNC: 31.6 PG — SIGNIFICANT CHANGE UP (ref 27–34)
MCHC RBC-ENTMCNC: 32 PG
MCHC RBC-ENTMCNC: 33.1 GM/DL
MCHC RBC-ENTMCNC: 33.7 GM/DL — SIGNIFICANT CHANGE UP (ref 32–36)
MCV RBC AUTO: 93.8 FL — SIGNIFICANT CHANGE UP (ref 80–100)
MCV RBC AUTO: 96.6 FL
MONOCYTES # BLD AUTO: 0.11 K/UL
MONOCYTES # BLD AUTO: 0.18 K/UL — SIGNIFICANT CHANGE UP (ref 0–0.9)
MONOCYTES NFR BLD AUTO: 2.7 %
MONOCYTES NFR BLD AUTO: 5.4 % — SIGNIFICANT CHANGE UP (ref 2–14)
NEUTROPHILS # BLD AUTO: 1.48 K/UL — LOW (ref 1.8–7.4)
NEUTROPHILS # BLD AUTO: 1.68 K/UL
NEUTROPHILS NFR BLD AUTO: 40.8 %
NEUTROPHILS NFR BLD AUTO: 44 % — SIGNIFICANT CHANGE UP (ref 43–77)
OXYHGB MFR BLDMV: 73.6 % — LOW (ref 90–95)
PLAT MORPH BLD: ABNORMAL
PLATELET # BLD AUTO: 208 K/UL — SIGNIFICANT CHANGE UP (ref 150–400)
PLATELET # BLD AUTO: 237 K/UL
POTASSIUM SERPL-MCNC: 4.2 MMOL/L — SIGNIFICANT CHANGE UP (ref 3.5–5.3)
POTASSIUM SERPL-SCNC: 4.2 MMOL/L — SIGNIFICANT CHANGE UP (ref 3.5–5.3)
POTASSIUM SERPL-SCNC: 4.4 MMOL/L
PROT SERPL-MCNC: 7.1 G/DL
RBC # BLD: 4.55 M/UL — SIGNIFICANT CHANGE UP (ref 4.2–5.8)
RBC # BLD: 4.69 M/UL
RBC # FLD: 13 % — SIGNIFICANT CHANGE UP (ref 10.3–14.5)
RBC # FLD: 13.2 %
RBC BLD AUTO: SIGNIFICANT CHANGE UP
SAO2 % BLD: 74.4 % — SIGNIFICANT CHANGE UP (ref 60–90)
SODIUM SERPL-SCNC: 140 MMOL/L
SODIUM SERPL-SCNC: 140 MMOL/L — SIGNIFICANT CHANGE UP (ref 135–145)
TACROLIMUS SERPL-MCNC: 11 NG/ML
WBC # BLD: 3.37 K/UL — LOW (ref 3.8–10.5)
WBC # FLD AUTO: 3.37 K/UL — LOW (ref 3.8–10.5)
WBC # FLD AUTO: 4.12 K/UL

## 2022-10-20 PROCEDURE — 80048 BASIC METABOLIC PNL TOTAL CA: CPT

## 2022-10-20 PROCEDURE — 82803 BLOOD GASES ANY COMBINATION: CPT

## 2022-10-20 PROCEDURE — 82962 GLUCOSE BLOOD TEST: CPT

## 2022-10-20 PROCEDURE — C1889: CPT

## 2022-10-20 PROCEDURE — 93308 TTE F-UP OR LMTD: CPT

## 2022-10-20 PROCEDURE — 99214 OFFICE O/P EST MOD 30 MIN: CPT | Mod: 25

## 2022-10-20 PROCEDURE — 93321 DOPPLER ECHO F-UP/LMTD STD: CPT

## 2022-10-20 PROCEDURE — 99152 MOD SED SAME PHYS/QHP 5/>YRS: CPT

## 2022-10-20 PROCEDURE — 88342 IMHCHEM/IMCYTCHM 1ST ANTB: CPT | Mod: 26

## 2022-10-20 PROCEDURE — 93505 ENDOMYOCARDIAL BIOPSY: CPT | Mod: 26,59

## 2022-10-20 PROCEDURE — 88307 TISSUE EXAM BY PATHOLOGIST: CPT

## 2022-10-20 PROCEDURE — 93308 TTE F-UP OR LMTD: CPT | Mod: 26

## 2022-10-20 PROCEDURE — C1894: CPT

## 2022-10-20 PROCEDURE — 88342 IMHCHEM/IMCYTCHM 1ST ANTB: CPT

## 2022-10-20 PROCEDURE — 93005 ELECTROCARDIOGRAM TRACING: CPT

## 2022-10-20 PROCEDURE — 88307 TISSUE EXAM BY PATHOLOGIST: CPT | Mod: 26

## 2022-10-20 PROCEDURE — 93321 DOPPLER ECHO F-UP/LMTD STD: CPT | Mod: 26

## 2022-10-20 PROCEDURE — 93505 ENDOMYOCARDIAL BIOPSY: CPT

## 2022-10-20 PROCEDURE — C1769: CPT

## 2022-10-20 PROCEDURE — 85025 COMPLETE CBC W/AUTO DIFF WBC: CPT

## 2022-10-20 PROCEDURE — 93010 ELECTROCARDIOGRAM REPORT: CPT

## 2022-10-20 RX ORDER — ASPIRIN/CALCIUM CARB/MAGNESIUM 324 MG
81 TABLET ORAL DAILY
Refills: 0 | Status: DISCONTINUED | OUTPATIENT
Start: 2022-10-20 | End: 2022-11-03

## 2022-10-20 NOTE — ASU DISCHARGE PLAN (ADULT/PEDIATRIC) - CARE PROVIDER_API CALL
Vivian Nuñez; PhD)  Adv Heart Fail Trnsplnt Cardio; Cardiovascular Disease; Internal Medicine  [address] [place] [address]  Phone: [phone]  Fax: [phone]  Follow Up Time: 2 weeks

## 2022-10-20 NOTE — ASU PATIENT PROFILE, ADULT - FALL HARM RISK - UNIVERSAL INTERVENTIONS
Bed in lowest position, wheels locked, appropriate side rails in place/Call bell, personal items and telephone in reach/Instruct patient to call for assistance before getting out of bed or chair/Non-slip footwear when patient is out of bed/[place] to call system/Physically safe environment - no spills, clutter or unnecessary equipment/Purposeful Proactive Rounding/Room/bathroom lighting operational, light cord in reach

## 2022-10-20 NOTE — ASU DISCHARGE PLAN (ADULT/PEDIATRIC) - NS MD DC FALL RISK RISK
For information on Fall & Injury Prevention, visit: [email or web address] OR  [email or web address] OR  https://www.cdc.gov/steadi/patient.html

## 2022-10-21 ENCOUNTER — APPOINTMENT (OUTPATIENT)
Dept: CARDIOLOGY | Facility: CLINIC | Age: 67
End: 2022-10-21

## 2022-10-21 ENCOUNTER — NON-APPOINTMENT (OUTPATIENT)
Age: 67
End: 2022-10-21

## 2022-10-21 VITALS
DIASTOLIC BLOOD PRESSURE: 71 MMHG | BODY MASS INDEX: 21.19 KG/M2 | SYSTOLIC BLOOD PRESSURE: 103 MMHG | OXYGEN SATURATION: 99 % | HEIGHT: 70 IN | WEIGHT: 148 LBS | HEART RATE: 109 BPM

## 2022-10-21 LAB — SURGICAL PATHOLOGY STUDY: SIGNIFICANT CHANGE UP

## 2022-10-21 PROCEDURE — 99214 OFFICE O/P EST MOD 30 MIN: CPT

## 2022-10-21 RX ORDER — PREDNISONE 5 MG/1
5 TABLET ORAL DAILY
Qty: 30 | Refills: 5 | Status: DISCONTINUED | COMMUNITY
Start: 2022-06-29 | End: 2022-10-21

## 2022-10-23 NOTE — DISCUSSION/SUMMARY
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 with strong FH and early arrhythmia history in his 30's) with LVED 5.2 cm and LVEF 10-15% s/p PPM upgraded to CRT-D, CAD s/p PCI to mLAD 4/2022, well controlled DM2 (A1c 6.2%), and CKD III (Cr 1.4) and VT who is now s/p OHT on 6/21/22 (ischemic time 261 mins, CMV +/+, Toxo -/-), doing clinically well.\par \par #s/p OHT\par - continue ASA 81mg daily and rosuvastatin 5mg daily for prevention TCAD\par - RHC showed normal hemodynamics and EMBx Grade pending\par - continue sildenafil 10mg TID\par - f/u heartcare, prior allomaps 28-31, allosure <0.12\par - referred to cardiac rehab, pt on waiting list\par \par #Immunosuppression\par - continue prograf for goal trough 10-12 now that pt is 3 months post. Last trough on 10/17 = 11.0\par - on reduced dose cellcept to 750mg bid in the setting of pt having looser stools and decreased WBC. May have to downtitrate further. Needs repeat CBC next week. \par - taper prednisone per protocol\par \par #prophylaxis\par - CMV +/+: continue valcyte 450mg BID. CMV PCR negative 10/17/22\par - Toxo -/-: no ppx needed\par - PJP: continue mepron 1500mg daily (bactrim was not trialed due to hyperkalemia and borderline elevated SCr)\par - thrush: completed nystatin S&S qid until prednisone was <10mg daily\par \par #ICD pocket +staph epidermiditis/morganella morganii\par - leukocytosis resolved\par - completed Cefpodoxime through 7/15\par - completed Doxyclycine through 7/21\par - per discharge plans, if WBC continue to remain elevated will need to consider bringing patient back to OR for ICD pocket washout\par - CT C/A/P 7/5 showed residual fibrous capsule in the region of the previous cardiac device in the left chest wall. 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 Vascular appt re-scheduled for tomorrow\par \par #CKD stable, SCr ~1.2, eGFR 66\par - needs f/u with Dr. Jaramillo \par - avoid nephrotoxic agents\par - remain off diuretics\par \par #HTN\par - continue nifedipine 60mg daily\par - pt to maintain home BP log and notify our team if >140/90\par \par #hyperglycemia\par - followed by endocrine, appreciate recs\par - continue metformin 1000mg bid and levemir 6 units qhs \par - continue to monitor FS \par \par #Health maintenance\par - continue mag oxide and MVI\par - continue calcium citrate + vitamin D\par - continue PPI\par \par RTC/bx +/- allomap in one month

## 2022-10-23 NOTE — HISTORY OF PRESENT ILLNESS
[FreeTextEntry1] : 65 YO M with a history of ACC/AHA Stage D mixed NICM/ICM (likely familial with strong FH and early arrhythmia history in his 30's) with LVED 5.2 cm and LVEF 10-15% s/p PPM upgraded to CRT-D, CAD s/p PCI to mLAD 4/2022, well controlled DM2 (A1c 6.2%), and CKD III (Cr 1.4) and VT who is now s/p OHT on 6/21/22 (ischemic time 261 mins, CMV +/+, Toxo -/-).\par \par Pt  initially presented to [facility] 5/20 with near syncope in setting of worsening HF symptoms and found to have 41 episodes of VT, many terminating with ATP. LHC did not reveal new obstructive CAD and he underwent EPS which did not reveal endocardial substrate amenable for ablation. RHC revealed severely depressed cardiac output and he was transferred to [facility] 5/26 for advanced therapies evaluation. IABP was placed and he was listed UNOS status 2e for OHT. He was briefly made status 7 as he became febrile without leukocytosis, then bld cx negative x48hrs he was re-activated UNOS status 2e. \par \par A suitable donor was identified and patient underwent OHT with Dr. Earl/Maria C on 6/21/22 (ischemic time 261 mins, CMV +/+, Toxo -/-). Patient was successfully extubated and IABP was removed on POD 1.  Cultures from the ICD site in the OR were positive for staph epidermidis/morganella morganii and he was treated with IV Vancomycin and Cefepime. CT scan demonstrated residual fibrous capsule in the region of the previous cardiac device in the left chest wall. Pt was then discharged home on oral antibiotics on 7/8/22.\par \par Pt is here today for f/u visit and biopsy. Since last seen, he reports feeling stronger and is walking more now 15-20 minutes twice a day on the home treadmill.  He denies CP, SOB at rest, HSIEH, orthopnea, PND, LH/dizziness, abdominal discomfort, palpitations, and syncope. No headache or tremor. No fevers or chills. His appetite is normal. Home vital signs logs reviewed and average -120's/80's, FS mostly 100-120mg/dL.

## 2022-10-23 NOTE — PHYSICAL EXAM
[No Xanthelasma] : no xanthelasma [Normal Venous Pressure] : normal venous pressure [Normal Radial B/L] : normal radial B/L [Normal] : alert and oriented, normal memory [de-identified] : not assessed - wearing a mask [de-identified] : JVP < 6 cm H2O, no HJR [de-identified] : +midsternal chest incision well healed, left chest AICD explant site C/D/I

## 2022-10-23 NOTE — CARDIOLOGY SUMMARY
[de-identified] : \par 10/20/22: ECG NSR 88bpm, RBBB, when compared to EKG 9/22/22 no significant changes\par 9/22/22 ECG: , RBBB,when compared to EKG 8/2/22 no significant changes\par 8/2/22 ECG: NSR 95bpm, RBBB, when compared to EKG 7/20/22 no significant changes\par 7/20/22 ECG: NSR at 100bpm RBBB\par 7/13/22 ECG: NSR at 94 bpm, RBBB. [de-identified] : 10/20/22 TTE limited: normal graft function. No peric effusion seen. \par TTE 8/2/22: limited study in the setting of an RV biopsy\par 1. Normal right ventricular size and function.\par 2. Small pericardiall effusion.\par A bioptome is noted in the right heart. No change to the size of the pericardial effusion or RV function\par 7/20/22: TTE: limited study: hyperdynamic LVSF, normal RV size and function, normal pericardium with trace to small pericardial effusion adjacent to RV\par 07/05/22 TTE: limited study: normal biV function, small loculated pericardial effusion at the LV apex, measuring approx 1.0 cm seen both pre and post images. [de-identified] : \par 6/28: RA 9, RV 3/11, PA 62/27/41, wedge 21 with v wave 31, PA sat 69.3%, /81/102, CO/Ci 6.87/3.62, grade 1R/2\par 7/5: RA 2, RV 35/3, PA 36/18/26, Wedge 15, PA sat 71.7, Kristy CO/CI 5.4/2.8, /75/93, SVR 1345 dsc, 1R/1A\par 7/13: RA 3, PAP 36/14/24 PCW 8, Pa sat 71%, Kristy CO/CI 6.1/3.2, EMBx ISHLT Grade 1R/1A\par 7/20: RA 3 PAP 47/20/30 PCW 15 PaSat 67% Kristy CO/CI 5.9/3.2 EMBx ISHLT Grade 1R/1A\par 8/2: RA 4, PAP 35/16/25 PCW 12 Pasat 68% Kristy CO/CI 5.3/2.9, EMBx ISHLT Grade 1R/1A\par 8/16: RA 5, PAP 42/19/27 PCW 15 Pasat 72% Kristy CO/CI 5.6/3.0, EMBx ISHLT Grade 0R\par 9/22: RA 5 PAP 41/18/28 PCW 16 PaSat 75% Kristy CO/CI 5.8/3.1, EMBx ISHLT Grade 1R/1A\par 10/20 RA 10/20/22 RA 2 RV 32/2 PA 33/9/21 PCWP 9 Kristy CO/CI 6.09/3.32 ACR 1R/1A, pAMR 0

## 2022-10-23 NOTE — END OF VISIT
[FreeTextEntry3] :  I, Ariela Blevins MD independently performed a history and physical examination of the patient, and formulated the management plan.\par I have reviewed the note by PRINCE Stiles and agree with the documented findings and plan of care.\par  [Time Spent: ___ minutes] : I have spent [unfilled] minutes of time on the encounter.

## 2022-10-24 ENCOUNTER — TRANSCRIPTION ENCOUNTER (OUTPATIENT)
Age: 67
End: 2022-10-24

## 2022-10-24 NOTE — PHYSICAL EXAM
[General Appearance - Well Developed] : well developed [Normal Appearance] : normal appearance [Well Groomed] : well groomed [General Appearance - Well Nourished] : well nourished [No Deformities] : no deformities [General Appearance - In No Acute Distress] : no acute distress [FreeTextEntry1] : + superficial veins dilated on LUE

## 2022-10-24 NOTE — DISCUSSION/SUMMARY
[FreeTextEntry1] : 66M ICM/NICM now s/p OHT 6/21/22, CRT-D, CAD s/p PCI mLAD 4/22, T2DM, CKD3, VT who is here for follow up of DVT and L radial artery occlusion.\par \par L radial artery occlusion and L subclavian v stenosis and superficial cephalic veins on R: suspect still with radial artery occlusion based on exam\par - LUE arterial duplex and bilateral UE venous duplex\par - continue eliquis 2.5 BID for now\par \par Await duplex results.  hopefully if we see resolution of thrombus we can go to aspirin alone

## 2022-10-24 NOTE — HISTORY OF PRESENT ILLNESS
[FreeTextEntry1] : 66M ICM/NICM now s/p OHT 6/21/22, CRT-D, CAD s/p PCI mLAD 4/22, T2DM, CKD3, VT who is here for follow up of DVT and L radial artery stenosis.\par \par Patient had a partially occlusive thrombus of L subclavian vein and superficial RUE DVT on VA duplex 6/28\par - VA Duplex 7/6 show no DVT, partially occlusive thrombus visualized in the medial segment of the left subclavian vein without change along with superficial thrombophlebitis right cephalic and basilic veins at the right forearm\par \par L radial artery occlusion in July 2022\par \par Still on Eliquis 2.5 mg PO BID\par \par Patient not having any CP, SOB, arm pain.

## 2022-10-27 ENCOUNTER — APPOINTMENT (OUTPATIENT)
Dept: ULTRASOUND IMAGING | Facility: HOSPITAL | Age: 67
End: 2022-10-27

## 2022-10-27 ENCOUNTER — OUTPATIENT (OUTPATIENT)
Dept: OUTPATIENT SERVICES | Facility: HOSPITAL | Age: 67
LOS: 1 days | End: 2022-10-27
Payer: MEDICAID

## 2022-10-27 DIAGNOSIS — I82.409 ACUTE EMBOLISM AND THROMBOSIS OF UNSPECIFIED DEEP VEINS OF UNSPECIFIED LOWER EXTREMITY: ICD-10-CM

## 2022-10-27 DIAGNOSIS — Z95.0 PRESENCE OF CARDIAC PACEMAKER: Chronic | ICD-10-CM

## 2022-10-27 DIAGNOSIS — Z98.890 OTHER SPECIFIED POSTPROCEDURAL STATES: Chronic | ICD-10-CM

## 2022-10-27 PROCEDURE — 93970 EXTREMITY STUDY: CPT | Mod: 26

## 2022-10-27 PROCEDURE — 93970 EXTREMITY STUDY: CPT

## 2022-11-03 LAB
ALBUMIN SERPL ELPH-MCNC: 5.1 G/DL
ALP BLD-CCNC: 56 U/L
ALT SERPL-CCNC: 16 U/L
ANION GAP SERPL CALC-SCNC: 15 MMOL/L
AST SERPL-CCNC: 14 U/L
BASOPHILS # BLD AUTO: 0.05 K/UL
BASOPHILS NFR BLD AUTO: 1.3 %
BILIRUB SERPL-MCNC: 0.7 MG/DL
BUN SERPL-MCNC: 21 MG/DL
CALCIUM SERPL-MCNC: 10.4 MG/DL
CHLORIDE SERPL-SCNC: 105 MMOL/L
CO2 SERPL-SCNC: 22 MMOL/L
CREAT SERPL-MCNC: 1.18 MG/DL
EGFR: 69 ML/MIN/1.73M2
EOSINOPHIL # BLD AUTO: 0.09 K/UL
EOSINOPHIL NFR BLD AUTO: 2.4 %
GLUCOSE SERPL-MCNC: 97 MG/DL
HCT VFR BLD CALC: 43.4 %
HGB BLD-MCNC: 14.4 G/DL
IMM GRANULOCYTES NFR BLD AUTO: 1.6 %
LYMPHOCYTES # BLD AUTO: 1.98 K/UL
LYMPHOCYTES NFR BLD AUTO: 53.1 %
MAGNESIUM SERPL-MCNC: 1.7 MG/DL
MAN DIFF?: NORMAL
MCHC RBC-ENTMCNC: 31.8 PG
MCHC RBC-ENTMCNC: 33.2 GM/DL
MCV RBC AUTO: 95.8 FL
MONOCYTES # BLD AUTO: 0.11 K/UL
MONOCYTES NFR BLD AUTO: 2.9 %
NEUTROPHILS # BLD AUTO: 1.44 K/UL
NEUTROPHILS NFR BLD AUTO: 38.7 %
PLATELET # BLD AUTO: 227 K/UL
POTASSIUM SERPL-SCNC: 4.2 MMOL/L
PROT SERPL-MCNC: 7 G/DL
RBC # BLD: 4.53 M/UL
RBC # FLD: 12.6 %
SODIUM SERPL-SCNC: 142 MMOL/L
TACROLIMUS SERPL-MCNC: 6.1 NG/ML
WBC # FLD AUTO: 3.73 K/UL

## 2022-11-09 ENCOUNTER — APPOINTMENT (OUTPATIENT)
Dept: ULTRASOUND IMAGING | Facility: CLINIC | Age: 67
End: 2022-11-09

## 2022-11-11 ENCOUNTER — RESULT REVIEW (OUTPATIENT)
Age: 67
End: 2022-11-11

## 2022-11-11 ENCOUNTER — OUTPATIENT (OUTPATIENT)
Dept: OUTPATIENT SERVICES | Facility: HOSPITAL | Age: 67
LOS: 1 days | End: 2022-11-11
Payer: MEDICAID

## 2022-11-11 ENCOUNTER — NON-APPOINTMENT (OUTPATIENT)
Age: 67
End: 2022-11-11

## 2022-11-11 ENCOUNTER — APPOINTMENT (OUTPATIENT)
Dept: ULTRASOUND IMAGING | Facility: HOSPITAL | Age: 67
End: 2022-11-11

## 2022-11-11 DIAGNOSIS — I77.1 STRICTURE OF ARTERY: ICD-10-CM

## 2022-11-11 DIAGNOSIS — Z95.0 PRESENCE OF CARDIAC PACEMAKER: Chronic | ICD-10-CM

## 2022-11-11 DIAGNOSIS — R52 PAIN, UNSPECIFIED: ICD-10-CM

## 2022-11-11 DIAGNOSIS — Z98.890 OTHER SPECIFIED POSTPROCEDURAL STATES: Chronic | ICD-10-CM

## 2022-11-11 PROCEDURE — 93931 UPPER EXTREMITY STUDY: CPT

## 2022-11-11 PROCEDURE — 93931 UPPER EXTREMITY STUDY: CPT | Mod: 26

## 2022-11-15 LAB
ALBUMIN SERPL ELPH-MCNC: 5 G/DL
ALP BLD-CCNC: 60 U/L
ALT SERPL-CCNC: 15 U/L
ANION GAP SERPL CALC-SCNC: 15 MMOL/L
AST SERPL-CCNC: 15 U/L
BASOPHILS # BLD AUTO: 0.06 K/UL
BASOPHILS NFR BLD AUTO: 1.5 %
BILIRUB SERPL-MCNC: 1.1 MG/DL
BUN SERPL-MCNC: 22 MG/DL
CALCIUM SERPL-MCNC: 10.2 MG/DL
CHLORIDE SERPL-SCNC: 105 MMOL/L
CO2 SERPL-SCNC: 24 MMOL/L
CREAT SERPL-MCNC: 1.21 MG/DL
EGFR: 67 ML/MIN/1.73M2
EOSINOPHIL # BLD AUTO: 0.09 K/UL
EOSINOPHIL NFR BLD AUTO: 2.3 %
GLUCOSE SERPL-MCNC: 93 MG/DL
HCT VFR BLD CALC: 48.2 %
HGB BLD-MCNC: 15.7 G/DL
IMM GRANULOCYTES NFR BLD AUTO: 1.5 %
LYMPHOCYTES # BLD AUTO: 2.03 K/UL
LYMPHOCYTES NFR BLD AUTO: 51.5 %
MAGNESIUM SERPL-MCNC: 1.9 MG/DL
MAN DIFF?: NORMAL
MCHC RBC-ENTMCNC: 31.5 PG
MCHC RBC-ENTMCNC: 32.6 GM/DL
MCV RBC AUTO: 96.8 FL
MONOCYTES # BLD AUTO: 0.16 K/UL
MONOCYTES NFR BLD AUTO: 4.1 %
NEUTROPHILS # BLD AUTO: 1.54 K/UL
NEUTROPHILS NFR BLD AUTO: 39.1 %
PLATELET # BLD AUTO: 227 K/UL
POTASSIUM SERPL-SCNC: 4.3 MMOL/L
PROT SERPL-MCNC: 7 G/DL
RBC # BLD: 4.98 M/UL
RBC # FLD: 12.9 %
SODIUM SERPL-SCNC: 143 MMOL/L
TACROLIMUS SERPL-MCNC: 8.8 NG/ML
WBC # FLD AUTO: 3.94 K/UL

## 2022-11-17 ENCOUNTER — NON-APPOINTMENT (OUTPATIENT)
Age: 67
End: 2022-11-17

## 2022-11-28 RX ORDER — ELECTROLYTES/DEXTROSE
SOLUTION, ORAL ORAL
Qty: 90 | Refills: 3 | Status: ACTIVE | COMMUNITY
Start: 2022-06-29 | End: 1900-01-01

## 2022-11-28 RX ORDER — ASPIRIN ENTERIC COATED TABLETS 81 MG 81 MG/1
81 TABLET, DELAYED RELEASE ORAL
Qty: 90 | Refills: 3 | Status: ACTIVE | COMMUNITY
Start: 2022-06-29 | End: 1900-01-01

## 2022-11-28 RX ORDER — PEN NEEDLE, DIABETIC 32 GX 1/4"
32G X 6 MM NEEDLE, DISPOSABLE MISCELLANEOUS
Qty: 90 | Refills: 3 | Status: ACTIVE | COMMUNITY
Start: 2022-06-29 | End: 1900-01-01

## 2022-11-28 RX ORDER — ISOPROPYL ALCOHOL 0.7 ML/ML
SWAB TOPICAL
Qty: 3 | Refills: 3 | Status: ACTIVE | COMMUNITY
Start: 2022-06-29 | End: 1900-01-01

## 2022-11-30 ENCOUNTER — OUTPATIENT (OUTPATIENT)
Dept: OUTPATIENT SERVICES | Facility: HOSPITAL | Age: 67
LOS: 1 days | End: 2022-11-30
Payer: MEDICAID

## 2022-11-30 ENCOUNTER — APPOINTMENT (OUTPATIENT)
Dept: HEART FAILURE | Facility: CLINIC | Age: 67
End: 2022-11-30

## 2022-11-30 ENCOUNTER — APPOINTMENT (OUTPATIENT)
Dept: CV DIAGNOSITCS | Facility: HOSPITAL | Age: 67
End: 2022-11-30

## 2022-11-30 VITALS
HEIGHT: 70 IN | DIASTOLIC BLOOD PRESSURE: 92 MMHG | SYSTOLIC BLOOD PRESSURE: 141 MMHG | BODY MASS INDEX: 21.62 KG/M2 | TEMPERATURE: 97.88 F | OXYGEN SATURATION: 98 % | HEART RATE: 97 BPM | RESPIRATION RATE: 16 BRPM | WEIGHT: 151 LBS

## 2022-11-30 DIAGNOSIS — Z95.0 PRESENCE OF CARDIAC PACEMAKER: Chronic | ICD-10-CM

## 2022-11-30 DIAGNOSIS — Z94.1 HEART TRANSPLANT STATUS: ICD-10-CM

## 2022-11-30 DIAGNOSIS — Z98.890 OTHER SPECIFIED POSTPROCEDURAL STATES: Chronic | ICD-10-CM

## 2022-11-30 PROCEDURE — C8929: CPT

## 2022-11-30 PROCEDURE — 93306 TTE W/DOPPLER COMPLETE: CPT | Mod: 26

## 2022-11-30 PROCEDURE — 93356 MYOCRD STRAIN IMG SPCKL TRCK: CPT

## 2022-11-30 PROCEDURE — 99215 OFFICE O/P EST HI 40 MIN: CPT

## 2022-11-30 RX ORDER — APIXABAN 2.5 MG/1
2.5 TABLET, FILM COATED ORAL
Qty: 180 | Refills: 3 | Status: DISCONTINUED | COMMUNITY
Start: 2022-07-07 | End: 2022-11-11

## 2022-12-01 LAB
ALBUMIN SERPL ELPH-MCNC: 5.5 G/DL
ALP BLD-CCNC: 72 U/L
ALT SERPL-CCNC: 14 U/L
ANION GAP SERPL CALC-SCNC: 15 MMOL/L
AST SERPL-CCNC: 14 U/L
BASOPHILS # BLD AUTO: 0.05 K/UL
BASOPHILS NFR BLD AUTO: 1.3 %
BILIRUB SERPL-MCNC: 1 MG/DL
BUN SERPL-MCNC: 23 MG/DL
CALCIUM SERPL-MCNC: 10.4 MG/DL
CHLORIDE SERPL-SCNC: 103 MMOL/L
CMV DNA SPEC QL NAA+PROBE: NOT DETECTED IU/ML
CMVPCR LOG: NOT DETECTED LOG10IU/ML
CO2 SERPL-SCNC: 25 MMOL/L
CREAT SERPL-MCNC: 1.27 MG/DL
EGFR: 63 ML/MIN/1.73M2
EOSINOPHIL # BLD AUTO: 0.08 K/UL
EOSINOPHIL NFR BLD AUTO: 2 %
GLUCOSE SERPL-MCNC: 96 MG/DL
HCT VFR BLD CALC: 51.5 %
HGB BLD-MCNC: 16.7 G/DL
IMM GRANULOCYTES NFR BLD AUTO: 1.8 %
LYMPHOCYTES # BLD AUTO: 2.11 K/UL
LYMPHOCYTES NFR BLD AUTO: 53 %
MAGNESIUM SERPL-MCNC: 1.7 MG/DL
MAN DIFF?: NORMAL
MCHC RBC-ENTMCNC: 31.6 PG
MCHC RBC-ENTMCNC: 32.4 GM/DL
MCV RBC AUTO: 97.4 FL
MONOCYTES # BLD AUTO: 0.1 K/UL
MONOCYTES NFR BLD AUTO: 2.5 %
NEUTROPHILS # BLD AUTO: 1.57 K/UL
NEUTROPHILS NFR BLD AUTO: 39.4 %
PLATELET # BLD AUTO: 248 K/UL
POTASSIUM SERPL-SCNC: 4.2 MMOL/L
PROT SERPL-MCNC: 8 G/DL
RBC # BLD: 5.29 M/UL
RBC # FLD: 13.2 %
SODIUM SERPL-SCNC: 142 MMOL/L
TACROLIMUS SERPL-MCNC: 12.1 NG/ML
WBC # FLD AUTO: 3.98 K/UL

## 2022-12-06 NOTE — PHYSICAL EXAM
[No Xanthelasma] : no xanthelasma [Normal Venous Pressure] : normal venous pressure [Normal Radial B/L] : normal radial B/L [Normal] : alert and oriented, normal memory [de-identified] : not assessed - wearing a mask [de-identified] : JVP < 6 cm H2O, no HJR [de-identified] : +midsternal chest incision well healed, left chest AICD explant site C/D/I

## 2022-12-06 NOTE — END OF VISIT
[Time Spent: ___ minutes] : I have spent [unfilled] minutes of time on the encounter. [FreeTextEntry3] : I, Dr. Nuñez, personally performed the evaluation and management (E/M) services for this established patient who presents today with (a) new problem(s)/exacerbation of (an) existing condition(s).  That E/M includes conducting the examination, assessing all new/exacerbated conditions, and establishing a new plan of care.  Today, my REBECA, [employer], was here to observe my evaluation and management services for this new problem/exacerbated condition to be followed going forward.

## 2022-12-06 NOTE — DISCUSSION/SUMMARY
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 with strong FH and early arrhythmia history in his 30's) with LVED 5.2 cm and LVEF 10-15% s/p PPM upgraded to CRT-D, CAD s/p PCI to mLAD 4/2022, well controlled DM2 (A1c 6.2%), and CKD III (Cr 1.4) and VT who is now s/p OHT on 6/21/22 (ischemic time 261 mins, CMV +/+, Toxo -/-), doing clinically well.\par \par #s/p OHT\par - continue ASA 81mg daily and rosuvastatin 5mg daily for prevention TCAD\par - 10/20/22 RHC showed normal hemodynamics and EMBx Grade 1R/1A\par - continue sildenafil 10mg TID\par - f/u heartcare, prior allomaps 28-31, allosure <0.12\par - referred to cardiac rehab, pt on waiting list\par \par #Immunosuppression\par - continue prograf for goal trough 10-12 now that pt is 3 months post. Last trough on 11/30 = 12.1\par - on reduced dose cellcept to 500mg bid in the setting of pt having looser stools and WBC ~3K. \par - taper prednisone per protocol if heartcare normal\par \par #prophylaxis\par - CMV +/+: continue valcyte 450mg BID. CMV PCR negative 10/17/22\par - Toxo -/-: no ppx needed\par - PJP: continue mepron 1500mg daily (bactrim was not trialed due to hyperkalemia and borderline elevated SCr)\par - thrush: completed nystatin S&S qid until prednisone was <10mg daily\par \par #ICD pocket +staph epidermiditis/morganella morganii\par - leukocytosis resolved\par - completed Cefpodoxime through 7/15\par - completed Doxyclycine through 7/21\par - per discharge plans, if WBC continue to remain elevated will need to consider bringing patient back to OR for ICD pocket washout\par - CT C/A/P 7/5 showed residual fibrous capsule in the region of the previous cardiac device in the left chest wall. 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 Saeed consult and recommendations\par - discontinued Eliquis 2.5 mg PO BID on 11/11/22\par \par #CKD stable, SCr ~1.2, eGFR 66\par - avoid nephrotoxic agents\par - remain off diuretics\par \par #HTN\par - continue nifedipine 60mg daily\par - start cardura 2mg daily\par - pt to maintain home BP log and notify our team if >140/90\par \par #hyperglycemia\par - followed by endocrine, appreciate recs\par - continue metformin 1000mg bid and levemir 6 units qhs \par - continue to monitor FS \par \par #Health maintenance\par - continue mag oxide and MVI\par - continue calcium citrate + vitamin D\par - continue PPI\par \par RTC/TTE + heartcare in one month

## 2022-12-06 NOTE — HISTORY OF PRESENT ILLNESS
[FreeTextEntry1] : 67 YO M with a history of ACC/AHA Stage D mixed NICM/ICM (likely familial with strong FH and early arrhythmia history in his 30's) with LVED 5.2 cm and LVEF 10-15% s/p PPM upgraded to CRT-D, CAD s/p PCI to mLAD 4/2022, well controlled DM2 (A1c 6.2%), and CKD III (Cr 1.4) and VT who is now s/p OHT on 6/21/22 (ischemic time 261 mins, CMV +/+, Toxo -/-).\par \par Pt  initially presented to [facility] 5/20 with near syncope in setting of worsening HF symptoms and found to have 41 episodes of VT, many terminating with ATP. LHC did not reveal new obstructive CAD and he underwent EPS which did not reveal endocardial substrate amenable for ablation. RHC revealed severely depressed cardiac output and he was transferred to [facility] 5/26 for advanced therapies evaluation. IABP was placed and he was listed UNOS status 2e for OHT. He was briefly made status 7 as he became febrile without leukocytosis, then bld cx negative x48hrs he was re-activated UNOS status 2e. \par \par A suitable donor was identified and patient underwent OHT with Dr. Eral/Maria C on 6/21/22 (ischemic time 261 mins, CMV +/+, Toxo -/-). Patient was successfully extubated and IABP was removed on POD 1.  Cultures from the ICD site in the OR were positive for staph epidermidis/morganella morganii and he was treated with IV Vancomycin and Cefepime. CT scan demonstrated residual fibrous capsule in the region of the previous cardiac device in the left chest wall. Pt was then discharged home on oral antibiotics on 7/8/22.\par \par Pt is here today for f/u clinic visit. Since last seen, he reports doing well and walking 2800 steps daily.  He denies CP, SOB at rest, HSIEH, orthopnea, PND, LH/dizziness, abdominal discomfort, palpitations, and syncope. No headache or tremor. No fevers or chills. His appetite is normal. Home vital signs logs reviewed and average -130's/90's, FS mostly 100-140 mg/dL.

## 2022-12-06 NOTE — CARDIOLOGY SUMMARY
[de-identified] : \par 10/20/22: ECG NSR 88bpm, RBBB, when compared to EKG 9/22/22 no significant changes\par 9/22/22 ECG: , RBBB,when compared to EKG 8/2/22 no significant changes\par 8/2/22 ECG: NSR 95bpm, RBBB, when compared to EKG 7/20/22 no significant changes\par 7/20/22 ECG: NSR at 100bpm RBBB\par 7/13/22 ECG: NSR at 94 bpm, RBBB. [de-identified] : \par 11/30/22 TTE EF 58% LVIDd 2.6, normal LFSV, decreased RV systolic function, RV wall strain -12.1%, no valvular abnormalities, no pericardial effusion\par 10/20/22 TTE limited: normal graft function. No pericardial effusion seen. \par TTE 8/2/22: limited study in the setting of an RV biopsy\par 1. Normal right ventricular size and function.\par 2. Small pericardiall effusion.\par A bioptome is noted in the right heart. No change to the size of the pericardial effusion or RV function\par 7/20/22: TTE: limited study: hyperdynamic LVSF, normal RV size and function, normal pericardium with trace to small pericardial effusion adjacent to RV\par 07/05/22 TTE: limited study: normal biV function, small loculated pericardial effusion at the LV apex, measuring approx 1.0 cm seen both pre and post images. [de-identified] : \par 6/28: RA 9, RV 3/11, PA 62/27/41, wedge 21 with v wave 31, PA sat 69.3%, /81/102, CO/Ci 6.87/3.62, grade 1R/2\par 7/5: RA 2, RV 35/3, PA 36/18/26, Wedge 15, PA sat 71.7, Kristy CO/CI 5.4/2.8, /75/93, SVR 1345 dsc, 1R/1A\par 7/13: RA 3, PAP 36/14/24 PCW 8, Pa sat 71%, Kristy CO/CI 6.1/3.2, EMBx ISHLT Grade 1R/1A\par 7/20: RA 3 PAP 47/20/30 PCW 15 PaSat 67% Kristy CO/CI 5.9/3.2 EMBx ISHLT Grade 1R/1A\par 8/2: RA 4, PAP 35/16/25 PCW 12 Pasat 68% Kristy CO/CI 5.3/2.9, EMBx ISHLT Grade 1R/1A\par 8/16: RA 5, PAP 42/19/27 PCW 15 Pasat 72% Kristy CO/CI 5.6/3.0, EMBx ISHLT Grade 0R\par 9/22: RA 5 PAP 41/18/28 PCW 16 PaSat 75% Kristy CO/CI 5.8/3.1, EMBx ISHLT Grade 1R/1A\par 10/20: RA 10/20/22 RA 2 RV 32/2 PA 33/9/21 PCWP 9 Kristy CO/CI 6.09/3.32 ACR 1R/1A, pAMR 0

## 2022-12-08 ENCOUNTER — APPOINTMENT (OUTPATIENT)
Dept: CARDIOLOGY | Facility: CLINIC | Age: 67
End: 2022-12-08

## 2022-12-13 ENCOUNTER — APPOINTMENT (OUTPATIENT)
Dept: CARDIOLOGY | Facility: CLINIC | Age: 67
End: 2022-12-13

## 2022-12-15 ENCOUNTER — APPOINTMENT (OUTPATIENT)
Dept: CARDIOLOGY | Facility: CLINIC | Age: 67
End: 2022-12-15

## 2022-12-21 ENCOUNTER — APPOINTMENT (OUTPATIENT)
Dept: HEART FAILURE | Facility: CLINIC | Age: 67
End: 2022-12-21

## 2022-12-21 ENCOUNTER — APPOINTMENT (OUTPATIENT)
Dept: CV DIAGNOSITCS | Facility: HOSPITAL | Age: 67
End: 2022-12-21

## 2022-12-27 ENCOUNTER — APPOINTMENT (OUTPATIENT)
Dept: CARDIOLOGY | Facility: CLINIC | Age: 67
End: 2022-12-27

## 2022-12-29 ENCOUNTER — APPOINTMENT (OUTPATIENT)
Dept: CARDIOLOGY | Facility: CLINIC | Age: 67
End: 2022-12-29

## 2023-01-03 ENCOUNTER — APPOINTMENT (OUTPATIENT)
Dept: CARDIOLOGY | Facility: CLINIC | Age: 68
End: 2023-01-03

## 2023-01-04 ENCOUNTER — APPOINTMENT (OUTPATIENT)
Dept: CV DIAGNOSITCS | Facility: HOSPITAL | Age: 68
End: 2023-01-04

## 2023-01-04 ENCOUNTER — APPOINTMENT (OUTPATIENT)
Dept: HEART FAILURE | Facility: CLINIC | Age: 68
End: 2023-01-04
Payer: MEDICARE

## 2023-01-04 ENCOUNTER — OUTPATIENT (OUTPATIENT)
Dept: OUTPATIENT SERVICES | Facility: HOSPITAL | Age: 68
LOS: 1 days | End: 2023-01-04
Payer: MEDICAID

## 2023-01-04 VITALS
BODY MASS INDEX: 21.62 KG/M2 | HEIGHT: 70 IN | WEIGHT: 151 LBS | DIASTOLIC BLOOD PRESSURE: 96 MMHG | TEMPERATURE: 97.9 F | HEART RATE: 105 BPM | SYSTOLIC BLOOD PRESSURE: 134 MMHG | OXYGEN SATURATION: 98 %

## 2023-01-04 DIAGNOSIS — R73.9 HYPERGLYCEMIA, UNSPECIFIED: ICD-10-CM

## 2023-01-04 DIAGNOSIS — T50.905A HYPERGLYCEMIA, UNSPECIFIED: ICD-10-CM

## 2023-01-04 DIAGNOSIS — I27.21 SECONDARY PULMONARY ARTERIAL HYPERTENSION: ICD-10-CM

## 2023-01-04 DIAGNOSIS — I25.10 ATHEROSCLEROTIC HEART DISEASE OF NATIVE CORONARY ARTERY WITHOUT ANGINA PECTORIS: ICD-10-CM

## 2023-01-04 DIAGNOSIS — Z95.0 PRESENCE OF CARDIAC PACEMAKER: Chronic | ICD-10-CM

## 2023-01-04 DIAGNOSIS — Z98.890 OTHER SPECIFIED POSTPROCEDURAL STATES: Chronic | ICD-10-CM

## 2023-01-04 PROCEDURE — 93356 MYOCRD STRAIN IMG SPCKL TRCK: CPT

## 2023-01-04 PROCEDURE — 76376 3D RENDER W/INTRP POSTPROCES: CPT | Mod: 26

## 2023-01-04 PROCEDURE — 99215 OFFICE O/P EST HI 40 MIN: CPT

## 2023-01-04 PROCEDURE — 76376 3D RENDER W/INTRP POSTPROCES: CPT

## 2023-01-04 PROCEDURE — 93306 TTE W/DOPPLER COMPLETE: CPT

## 2023-01-04 PROCEDURE — 93306 TTE W/DOPPLER COMPLETE: CPT | Mod: 26

## 2023-01-04 RX ORDER — NIFEDIPINE 60 MG/1
60 TABLET, EXTENDED RELEASE ORAL DAILY
Qty: 90 | Refills: 3 | Status: DISCONTINUED | COMMUNITY
Start: 2022-06-29 | End: 2023-01-04

## 2023-01-04 RX ORDER — DOXAZOSIN 2 MG/1
2 TABLET ORAL AT BEDTIME
Qty: 15 | Refills: 5 | Status: DISCONTINUED | COMMUNITY
Start: 2022-11-30 | End: 2023-01-04

## 2023-01-04 RX ORDER — VALGANCICLOVIR HYDROCHLORIDE 450 MG/1
450 TABLET ORAL
Qty: 180 | Refills: 3 | Status: DISCONTINUED | COMMUNITY
Start: 2022-06-29 | End: 2023-01-04

## 2023-01-05 ENCOUNTER — APPOINTMENT (OUTPATIENT)
Dept: CARDIOLOGY | Facility: CLINIC | Age: 68
End: 2023-01-05

## 2023-01-05 LAB
ALBUMIN SERPL ELPH-MCNC: 5 G/DL
ALP BLD-CCNC: 69 U/L
ALT SERPL-CCNC: 14 U/L
ANION GAP SERPL CALC-SCNC: 16 MMOL/L
AST SERPL-CCNC: 12 U/L
BASOPHILS # BLD AUTO: 0.06 K/UL
BASOPHILS NFR BLD AUTO: 1.7 %
BILIRUB SERPL-MCNC: 0.8 MG/DL
BUN SERPL-MCNC: 25 MG/DL
CALCIUM SERPL-MCNC: 10.3 MG/DL
CHLORIDE SERPL-SCNC: 105 MMOL/L
CO2 SERPL-SCNC: 21 MMOL/L
CREAT SERPL-MCNC: 1.33 MG/DL
EGFR: 59 ML/MIN/1.73M2
EOSINOPHIL # BLD AUTO: 0.08 K/UL
EOSINOPHIL NFR BLD AUTO: 2.3 %
GLUCOSE SERPL-MCNC: 97 MG/DL
HCT VFR BLD CALC: 45.8 %
HGB BLD-MCNC: 15.7 G/DL
IMM GRANULOCYTES NFR BLD AUTO: 2.3 %
LYMPHOCYTES # BLD AUTO: 1.82 K/UL
LYMPHOCYTES NFR BLD AUTO: 52.1 %
MAGNESIUM SERPL-MCNC: 1.7 MG/DL
MAN DIFF?: NORMAL
MCHC RBC-ENTMCNC: 32.5 PG
MCHC RBC-ENTMCNC: 34.3 GM/DL
MCV RBC AUTO: 94.8 FL
MONOCYTES # BLD AUTO: 0.15 K/UL
MONOCYTES NFR BLD AUTO: 4.3 %
NEUTROPHILS # BLD AUTO: 1.3 K/UL
NEUTROPHILS NFR BLD AUTO: 37.3 %
PLATELET # BLD AUTO: 208 K/UL
POTASSIUM SERPL-SCNC: 4.1 MMOL/L
PROT SERPL-MCNC: 7.4 G/DL
RBC # BLD: 4.83 M/UL
RBC # FLD: 12.9 %
SODIUM SERPL-SCNC: 142 MMOL/L
TACROLIMUS SERPL-MCNC: 9.5 NG/ML
WBC # FLD AUTO: 3.49 K/UL

## 2023-01-05 NOTE — PROGRESS NOTE ADULT - SUBJECTIVE AND OBJECTIVE BOX
[facility] Division of Hospital Medicine  Simon Cardona MD  Pager (M-F, 8A-5P): [phone]  Other Times:  [phone]    Patient is a 66y old  Male who presents with a chief complaint of LVAD/OHT eval (04 Jun 2022 12:59)      SUBJECTIVE / OVERNIGHT EVENTS: No acute events. No complaints.  ADDITIONAL REVIEW OF SYSTEMS: No palpitations or chest pain    MEDICATIONS  (STANDING):  allopurinol 100 milliGRAM(s) Oral daily  aMIOdarone    Tablet 200 milliGRAM(s) Oral daily  aspirin enteric coated 81 milliGRAM(s) Oral daily  atorvastatin 80 milliGRAM(s) Oral at bedtime  ceFAZolin   IVPB 1000 milliGRAM(s) IV Intermittent every 8 hours  chlorhexidine 4% Liquid 1 Application(s) Topical <User Schedule>  heparin   Injectable 5000 Unit(s) SubCutaneous every 8 hours  hydrALAZINE 25 milliGRAM(s) Oral every 8 hours  influenza   Vaccine 0.5 milliLiter(s) IntraMuscular once  insulin lispro (ADMELOG) corrective regimen sliding scale   SubCutaneous three times a day before meals  insulin lispro (ADMELOG) corrective regimen sliding scale   SubCutaneous at bedtime  lidocaine   4% Patch 1 Patch Transdermal daily  metoprolol succinate ER      metoprolol succinate ER 25 milliGRAM(s) Oral daily  milrinone Infusion 0.5 MICROgram(s)/kG/Min (11 mL/Hr) IV Continuous <Continuous>  pantoprazole    Tablet 40 milliGRAM(s) Oral before breakfast  polyethylene glycol 3350 17 Gram(s) Oral two times a day  senna 2 Tablet(s) Oral at bedtime  sodium chloride 0.9%. 500 milliLiter(s) (30 mL/Hr) IV Continuous <Continuous>    MEDICATIONS  (PRN):  acetaminophen     Tablet .. 650 milliGRAM(s) Oral every 6 hours PRN Temp greater or equal to 38C (100.4F), Mild Pain (1 - 3)      CAPILLARY BLOOD GLUCOSE      POCT Blood Glucose.: 99 mg/dL (04 Jun 2022 11:45)  POCT Blood Glucose.: 90 mg/dL (04 Jun 2022 08:06)  POCT Blood Glucose.: 136 mg/dL (03 Jun 2022 21:19)  POCT Blood Glucose.: 104 mg/dL (03 Jun 2022 17:07)    I&O's Summary    03 Jun 2022 07:01  -  04 Jun 2022 07:00  --------------------------------------------------------  IN: 492 mL / OUT: 775 mL / NET: -283 mL    04 Jun 2022 07:01  -  04 Jun 2022 16:28  --------------------------------------------------------  IN: 720 mL / OUT: 700 mL / NET: 20 mL        PHYSICAL EXAM:  Vital Signs Last 24 Hrs  T(C): 36.4 (04 Jun 2022 12:04), Max: 36.6 (03 Jun 2022 20:43)  T(F): 97.5 (04 Jun 2022 12:04), Max: 97.8 (03 Jun 2022 20:43)  HR: 78 (04 Jun 2022 12:04) (78 - 86)  BP: 109/75 (04 Jun 2022 12:04) (96/67 - 122/85)  BP(mean): --  RR: 18 (04 Jun 2022 12:04) (18 - 18)  SpO2: 98% (04 Jun 2022 12:04) (94% - 98%)  CONSTITUTIONAL: NAD, well-developed, well-groomed  EYES: EOMI; conjunctiva and sclera clear  ENMT: Moist oral mucosa, no pharyngeal injection or exudates  RESPIRATORY: Normal respiratory effort; lungs are clear to auscultation bilaterally  CARDIOVASCULAR: Regular rate and rhythm, normal S1 and S2, no murmur/rub/gallop; Trace LE edema  ABDOMEN: Nontender to palpation, normoactive bowel sounds, no rebound/guarding; No hepatosplenomegaly  PSYCH: A+O to person, place, and time; affect appropriate  NEUROLOGY: moving all extremities; no gross sensory deficits   SKIN: No rashes; no palpable lesions    LABS:                        16.4   15.47 )-----------( 347      ( 03 Jun 2022 06:04 )             50.0     06-03    138  |  101  |  22  ----------------------------<  78  4.4   |  25  |  1.25    Ca    9.9      03 Jun 2022 06:02                Culture - Blood (collected 02 Jun 2022 15:54)  Source: .Blood Blood-Peripheral  Preliminary Report (03 Jun 2022 23:01):    No growth to date.    Culture - Blood (collected 02 Jun 2022 15:40)  Source: .Blood Blood-Peripheral  Preliminary Report (03 Jun 2022 23:01):    No growth to date.        RADIOLOGY & ADDITIONAL TESTS:  Results Reviewed:   Imaging Personally Reviewed:  Electrocardiogram Personally Reviewed:    COORDINATION OF CARE:  Care Discussed with Consultants/Other Providers [Y/N]:  Prior or Outpatient Records Reviewed [Y/N]:   Bilobed Flap Text: The defect edges were debeveled with a #15 scalpel blade.  Given the location of the defect and the proximity to free margins a bilobe flap was deemed most appropriate.  Using a sterile surgical marker, an appropriate bilobe flap drawn around the defect.    The area thus outlined was incised deep to adipose tissue with a #15 scalpel blade.  The skin margins were undermined to an appropriate distance in all directions utilizing iris scissors.

## 2023-01-06 PROBLEM — I27.21 WHO GROUP 1 PULMONARY ARTERIAL HYPERTENSION: Status: ACTIVE | Noted: 2023-01-06

## 2023-01-06 PROBLEM — R73.9 HYPERGLYCEMIA, DRUG-INDUCED: Status: ACTIVE | Noted: 2022-06-29

## 2023-01-06 LAB
CMV DNA SPEC QL NAA+PROBE: NOT DETECTED IU/ML
CMVPCR LOG: NOT DETECTED LOG10IU/ML

## 2023-01-09 ENCOUNTER — APPOINTMENT (OUTPATIENT)
Dept: INTERNAL MEDICINE | Facility: CLINIC | Age: 68
End: 2023-01-09
Payer: MEDICARE

## 2023-01-09 VITALS
OXYGEN SATURATION: 96 % | SYSTOLIC BLOOD PRESSURE: 120 MMHG | HEART RATE: 111 BPM | DIASTOLIC BLOOD PRESSURE: 74 MMHG | BODY MASS INDEX: 22.19 KG/M2 | WEIGHT: 155 LBS | TEMPERATURE: 97.3 F | HEIGHT: 70 IN

## 2023-01-09 DIAGNOSIS — Z01.818 ENCOUNTER FOR OTHER PREPROCEDURAL EXAMINATION: ICD-10-CM

## 2023-01-09 DIAGNOSIS — E83.42 HYPOMAGNESEMIA: ICD-10-CM

## 2023-01-09 DIAGNOSIS — Z82.49 FAMILY HISTORY OF ISCHEMIC HEART DISEASE AND OTHER DISEASES OF THE CIRCULATORY SYSTEM: ICD-10-CM

## 2023-01-09 DIAGNOSIS — M10.9 GOUT, UNSPECIFIED: ICD-10-CM

## 2023-01-09 DIAGNOSIS — Z86.39 PERSONAL HISTORY OF OTHER ENDOCRINE, NUTRITIONAL AND METABOLIC DISEASE: ICD-10-CM

## 2023-01-09 DIAGNOSIS — A49.8 OTHER BACTERIAL INFECTIONS OF UNSPECIFIED SITE: ICD-10-CM

## 2023-01-09 DIAGNOSIS — Z83.3 FAMILY HISTORY OF DIABETES MELLITUS: ICD-10-CM

## 2023-01-09 DIAGNOSIS — I80.9 PHLEBITIS AND THROMBOPHLEBITIS OF UNSPECIFIED SITE: ICD-10-CM

## 2023-01-09 PROCEDURE — G0402 INITIAL PREVENTIVE EXAM: CPT

## 2023-01-09 RX ORDER — METFORMIN HYDROCHLORIDE 500 MG/1
500 TABLET, COATED ORAL
Qty: 360 | Refills: 3 | Status: DISCONTINUED | COMMUNITY
Start: 2022-07-07 | End: 2023-01-09

## 2023-01-09 NOTE — ASSESSMENT
[FreeTextEntry1] : HCM:\par - Lung ca screening--doesn't qualify based on pack-years\par - Lipids\par \par RTC 4-6 months given multiple subspecialty follow-up

## 2023-01-09 NOTE — CARDIOLOGY SUMMARY
[de-identified] : \par 10/20/22: ECG NSR 88bpm, RBBB, when compared to EKG 9/22/22 no significant changes\par 9/22/22 ECG: , RBBB,when compared to EKG 8/2/22 no significant changes\par 8/2/22 ECG: NSR 95bpm, RBBB, when compared to EKG 7/20/22 no significant changes\par 7/20/22 ECG: NSR at 100bpm RBBB\par 7/13/22 ECG: NSR at 94 bpm, RBBB. [de-identified] : \par 1/4/23 TTE EF 64% LVIDd 4.5cm, normal LVSF, Global strain -20%, decreased RV systolic function, no valvular abnormalities, no pericardial effusion. Copmared to TTE 11/30/22, no significant changes noted. \par 11/30/22 TTE EF 58% LVIDd 2.6, normal LFSV, decreased RV systolic function, RV wall strain -12.1%, no valvular abnormalities, no pericardial effusion\par 10/20/22 TTE limited: normal graft function. No pericardial effusion seen. \par TTE 8/2/22: limited study in the setting of an RV biopsy\par 1. Normal right ventricular size and function.\par 2. Small pericardiall effusion.\par A bioptome is noted in the right heart. No change to the size of the pericardial effusion or RV function\par 7/20/22: TTE: limited study: hyperdynamic LVSF, normal RV size and function, normal pericardium with trace to small pericardial effusion adjacent to RV\par 07/05/22 TTE: limited study: normal biV function, small loculated pericardial effusion at the LV apex, measuring approx 1.0 cm seen both pre and post images. [de-identified] : \par 6/28: RA 9, RV 3/11, PA 62/27/41, wedge 21 with v wave 31, PA sat 69.3%, /81/102, CO/Ci 6.87/3.62, grade 1R/2\par 7/5: RA 2, RV 35/3, PA 36/18/26, Wedge 15, PA sat 71.7, Kristy CO/CI 5.4/2.8, /75/93, SVR 1345 dsc, 1R/1A\par 7/13: RA 3, PAP 36/14/24 PCW 8, Pa sat 71%, Kristy CO/CI 6.1/3.2, EMBx ISHLT Grade 1R/1A\par 7/20: RA 3 PAP 47/20/30 PCW 15 PaSat 67% Kristy CO/CI 5.9/3.2 EMBx ISHLT Grade 1R/1A\par 8/2: RA 4, PAP 35/16/25 PCW 12 Pasat 68% Kristy CO/CI 5.3/2.9, EMBx ISHLT Grade 1R/1A\par 8/16: RA 5, PAP 42/19/27 PCW 15 Pasat 72% Kristy CO/CI 5.6/3.0, EMBx ISHLT Grade 0R\par 9/22: RA 5 PAP 41/18/28 PCW 16 PaSat 75% Kristy CO/CI 5.8/3.1, EMBx ISHLT Grade 1R/1A\par 10/20: RA 10/20/22 RA 2 RV 32/2 PA 33/9/21 PCWP 9 Kristy CO/CI 6.09/3.32 ACR 1R/1A, pAMR 0

## 2023-01-09 NOTE — PHYSICAL EXAM
[No Acute Distress] : no acute distress [Well Developed] : well developed [PERRL] : pupils equal round and reactive to light [EOMI] : extraocular movements intact [Normal Outer Ear/Nose] : the outer ears and nose were normal in appearance [Normal Oropharynx] : the oropharynx was normal [Normal TMs] : both tympanic membranes were normal [No Respiratory Distress] : no respiratory distress  [No Accessory Muscle Use] : no accessory muscle use [Clear to Auscultation] : lungs were clear to auscultation bilaterally [Normal Rate] : normal rate  [Regular Rhythm] : with a regular rhythm [Normal S1, S2] : normal S1 and S2 [No Murmur] : no murmur heard [Pedal Pulses Present] : the pedal pulses are present [No Edema] : there was no peripheral edema [Soft] : abdomen soft [Non Tender] : non-tender [Non-distended] : non-distended [No Masses] : no abdominal mass palpated [Grossly Normal Strength/Tone] : grossly normal strength/tone [No Rash] : no rash [Coordination Grossly Intact] : coordination grossly intact [Normal Gait] : normal gait [Normal Affect] : the affect was normal [Normal Insight/Judgement] : insight and judgment were intact [None] : no ulcers in either foot were found [] : both feet [de-identified] : ?pinguecula noted of the bilateral eyes [de-identified] : Slight tremor noted with finger to nose bilaterally but no dysmetria. Strengthe appears to be equal in the uppoer and lower extremities [TWNoteComboBox5] : +1 [TWNoteComboBox6] : +1

## 2023-01-09 NOTE — END OF VISIT
[FreeTextEntry3] : I, Dr. Nuñez, personally performed the evaluation and management (E/M) services for this established patient who presents today with (a) new problem(s)/exacerbation of (an) existing condition(s).  That E/M includes conducting the examination, assessing all new/exacerbated conditions, and establishing a new plan of care.  Today, my REBECA, [employer], was here to observe my evaluation and management services for this new problem/exacerbated condition to be followed going forward.  [Time Spent: ___ minutes] : I have spent [unfilled] minutes of time on the encounter.

## 2023-01-09 NOTE — HISTORY OF PRESENT ILLNESS
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 LHC did not reveal new obstructive CAD and he underwent EPS which did not reveal endocardial substrate amenable for ablation. RHC revealed severely depressed cardiac output and he was transferred to [facility] 5/26 for advanced therapies evaluation. IABP was placed and he was listed UNOS status 2e for OHT. He was briefly made status 7 as he became febrile without leukocytosis, then bld cx negative x48hrs he was re-activated UNOS status 2e. \par \par A suitable donor was identified and patient underwent OHT with Dr. Earl/Maria C on 6/21/22 (ischemic time 261 mins, CMV +/+, Toxo -/-). Patient was successfully extubated and IABP was removed on POD 1.  Cultures from the ICD site in the OR were positive for staph epidermidis/morganella morganii and he was treated with IV Vancomycin and Cefepime. CT scan demonstrated residual fibrous capsule in the region of the previous cardiac device in the left chest wall. Pt was then discharged home on oral antibiotics on 7/8/22.\par \par Pt is here today for f/u clinic visit. Since last seen, he reports doing well and walking 3000 steps daily.  He reports occasionally am headache that resolves on it's own.  He denies CP, SOB at rest, HSIEH, orthopnea, PND, LH/dizziness, abdominal discomfort, palpitations, and syncope. No tremor, fevers or chills. His appetite is normal. Sleeping well.  Pt self discontinued cardura and split his nifedipine dose in half for bid dosing without speaking to the team.  Home vital signs logs reviewed and average -130's/70-90's, FS mostly 100-150 mg/dL.

## 2023-01-09 NOTE — HEALTH RISK ASSESSMENT
[Former] : Former [0-4] : 0-4 [No] : In the past 12 months have you used drugs other than those required for medical reasons? No [No falls in past year] : Patient reported no falls in the past year [0] : 1) Little interest or pleasure doing things: Not at all (0) [1] : 2) Feeling down, depressed, or hopeless for several days (1) [PHQ-2 Positive] : PHQ-2 Positive [With Family] : lives with family [Retired] : retired [] :  [Sexually Active] : sexually active [Fully functional (bathing, dressing, toileting, transferring, walking, feeding)] : Fully functional (bathing, dressing, toileting, transferring, walking, feeding) [Fully functional (using the telephone, shopping, preparing meals, housekeeping, doing laundry, using] : Fully functional and needs no help or supervision to perform IADLs (using the telephone, shopping, preparing meals, housekeeping, doing laundry, using transportation, managing medications and managing finances) [Reports changes in vision] : Reports changes in vision [Seat Belt] :  uses seat belt [YearQuit] : 2006 [de-identified] : Quit since transplant [de-identified] : L [[id number]] : 1 [Reports changes in hearing] : Reports no changes in hearing [Reports changes in dental health] : Reports no changes in dental health [Guns at Home] : no guns at home [FreeTextEntry2] : Plumbing/constriction [de-identified] : Impressed importance of good dental hygiene not that he is s/p transplant

## 2023-01-09 NOTE — PHYSICAL EXAM
[No Xanthelasma] : no xanthelasma [Normal Venous Pressure] : normal venous pressure [Normal Radial B/L] : normal radial B/L [Normal] : alert and oriented, normal memory [de-identified] : not assessed - wearing a mask [de-identified] : JVP < 6 cm H2O, no HJR

## 2023-01-09 NOTE — HISTORY OF PRESENT ILLNESS
[de-identified] : Gocool, Lennox is a 66yo M with PMhx of CAD/HFrEF s/p heart txp, DM, and CKD3 who presents to establish care.\par \par L radial thrombus 2/2 procedure, recently had eliquis discontinued\par \par A few concerns:\par Headaches/lightheadedness in the AM since discharge. Typically sx resolve after taking BP medications. Associated with eye pain and neck pain. Can be throbbing or dull. Tremors occasionally with exertion. Improving with strength increasing. Specifies when he is holding a position. \par \par Requesting uric acid testing\par \par Mom, Dad, siblign: DM, heart\par

## 2023-01-09 NOTE — DISCUSSION/SUMMARY
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 with strong FH and early arrhythmia history in his 30's) with LVED 5.2 cm and LVEF 10-15% s/p PPM upgraded to CRT-D, CAD s/p PCI to mLAD 4/2022, well controlled DM2 (A1c 6.2%), and CKD III (Cr 1.4) and VT who is now s/p OHT on 6/21/22 (ischemic time 261 mins, CMV +/+, Toxo -/-), doing clinically well.\par \par #s/p OHT\par - continue ASA 81mg daily and rosuvastatin 5mg daily for prevention TCAD\par - 10/20/22 RHC showed normal hemodynamics and EMBx Grade 1R/1A\par - continue sildenafil 10mg TID for pulmonary hypertension pre transplant\par - f/u heartcare, prior allomaps 28-31, allosure <0.12\par - encouraged increase in exercise/daily steps to achieve goal 6,000 steps daily and referred to cardiac rehab\par \par #Immunosuppression\par - continue prograf for goal trough 10-12 now that pt is 3 months post. Today's trough = 9.5, continue 1mg q am and 1.5mg q pm\par - on reduced dose cellcept to 500mg bid in the setting of pt having looser stools and WBC ~3K. \par - taper prednisone per protocol if heartcare normal (pt one step behind in taper, will plan to reduce to 2mg daily then discontinue in one month)\par \par #prophylaxis\par - CMV +/+: discontinue valcyte now, completed 6 months. CMV PCR last negative (1/4/23)\par - Toxo -/-: no ppx needed\par - PJP: continue mepron 1500mg daily (bactrim was not trialed due to hyperkalemia and borderline elevated SCr)\par - thrush: completed nystatin S&S qid until prednisone was <10mg daily\par \par #ICD pocket +staph epidermiditis/morganella morganii\par - leukocytosis resolved\par - completed Cefpodoxime through 7/15\par - completed Doxyclycine through 7/21\par - per discharge plans, if WBC continue to remain elevated will need to consider bringing patient back to OR for ICD pocket washout\par - CT C/A/P 7/5 showed residual fibrous capsule in the region of the previous cardiac device in the left chest wall. 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 Saeed consult and recommendations\par - discontinued Eliquis 2.5 mg PO BID on 11/11/22\par \par #CKD stable, SCr ~1.2, eGFR 66\par - avoid nephrotoxic agents\par - remain off diuretics\par \par #HTN\par - decrease nifedipine to 30mg daily\par - pt did not tolerate cardura due to hypotension\par - pt to maintain home BP log and notify our team if >140/90\par \par #hyperglycemia, DM Type 2 (diagnosed 5/2022, HgA1c 6.7%)\par - followed by endocrine, appreciate recs, f/u to adjust insulin as prednisone is being weaned\par - continue metformin 1000mg bid and levemir 6 units qhs \par - continue to monitor FS \par \par #Health maintenance\par - continue mag oxide and MVI\par - continue calcium citrate + vitamin D\par - continue PPI\par \par Continue monthly heartcare/labs and RTC/TTE in 2 months

## 2023-01-10 ENCOUNTER — APPOINTMENT (OUTPATIENT)
Dept: CARDIOLOGY | Facility: CLINIC | Age: 68
End: 2023-01-10

## 2023-01-10 LAB
CHOLEST SERPL-MCNC: 146 MG/DL
CREAT SPEC-SCNC: 105 MG/DL
FOLATE SERPL-MCNC: 19 NG/ML
HDLC SERPL-MCNC: 41 MG/DL
LDLC SERPL CALC-MCNC: 62 MG/DL
MICROALBUMIN 24H UR DL<=1MG/L-MCNC: 2 MG/DL
MICROALBUMIN/CREAT 24H UR-RTO: 19 MG/G
NONHDLC SERPL-MCNC: 105 MG/DL
TRIGL SERPL-MCNC: 214 MG/DL
TSH SERPL-ACNC: 1.48 UIU/ML
URATE SERPL-MCNC: 7 MG/DL
VIT B12 SERPL-MCNC: 559 PG/ML

## 2023-01-12 ENCOUNTER — APPOINTMENT (OUTPATIENT)
Dept: CARDIOLOGY | Facility: CLINIC | Age: 68
End: 2023-01-12

## 2023-01-17 ENCOUNTER — APPOINTMENT (OUTPATIENT)
Dept: CARDIOLOGY | Facility: CLINIC | Age: 68
End: 2023-01-17

## 2023-01-18 ENCOUNTER — APPOINTMENT (OUTPATIENT)
Dept: CV DIAGNOSITCS | Facility: HOSPITAL | Age: 68
End: 2023-01-18

## 2023-01-18 ENCOUNTER — APPOINTMENT (OUTPATIENT)
Dept: HEART FAILURE | Facility: CLINIC | Age: 68
End: 2023-01-18

## 2023-01-19 ENCOUNTER — APPOINTMENT (OUTPATIENT)
Dept: CARDIOLOGY | Facility: CLINIC | Age: 68
End: 2023-01-19

## 2023-01-23 ENCOUNTER — RX RENEWAL (OUTPATIENT)
Age: 68
End: 2023-01-23

## 2023-01-24 ENCOUNTER — APPOINTMENT (OUTPATIENT)
Dept: CARDIOLOGY | Facility: CLINIC | Age: 68
End: 2023-01-24

## 2023-01-26 ENCOUNTER — APPOINTMENT (OUTPATIENT)
Dept: ENDOCRINOLOGY | Facility: CLINIC | Age: 68
End: 2023-01-26
Payer: MEDICARE

## 2023-01-26 ENCOUNTER — APPOINTMENT (OUTPATIENT)
Dept: CARDIOLOGY | Facility: CLINIC | Age: 68
End: 2023-01-26

## 2023-01-26 VITALS
WEIGHT: 153 LBS | BODY MASS INDEX: 21.9 KG/M2 | OXYGEN SATURATION: 99 % | HEIGHT: 70 IN | HEART RATE: 101 BPM | SYSTOLIC BLOOD PRESSURE: 122 MMHG | DIASTOLIC BLOOD PRESSURE: 84 MMHG

## 2023-01-26 LAB — HBA1C MFR BLD HPLC: 5.5

## 2023-01-26 PROCEDURE — 83036 HEMOGLOBIN GLYCOSYLATED A1C: CPT | Mod: QW

## 2023-01-26 PROCEDURE — 99214 OFFICE O/P EST MOD 30 MIN: CPT

## 2023-01-26 NOTE — HISTORY OF PRESENT ILLNESS
[FreeTextEntry1] : 67M male with PMHx HTN, HLD, type 2 DM, chronic HFrEF, (EF 25-30% by Echo) who underwent OHT on 22, currently on prednisone. Endocrinology consulted for assistance with management of steroid induced hyperglycemia/dm2.\par \par Controlled type 2 diabetes mellitus with hyperglycemia.\par Diagnosis: Type 2DM, diagnosed initially 2022. \par \par today 23: A1c 5.5%\par \par Previous Medications\par Glimepiride 1 mg was not on steroids at that time. Diagnosed with diabetes 2022. \par \par Current Medications:\par Levemir  6 units QHS -- 5pm \par Metformin ER 500mg 2 tablets BID \par \par Meter Readings:\par See scanned logs.\par AM Fastin, 105, 97, 107, 101, 103, 115 , 112, 117, 104, 112, 124\par Pre lunch: 120, 110, 115, 120, 112, 122, 130, 110, 120, 130\par pre-dinner: 151, 112, 130, 146, 159, 138, 111, 129, 116, 108, 135\par bedtime: 128, 108, 118, 130, 128, 110, 120, 112, 118, 114, 116\par Readings dependent on meal size. \par \par Current Steroid Regimen: Based on biopsy results\par -Prednisone tapered to 12.5 mg BID on  with BG at goal since dinner\par Reduced to 10 mg BID on \par Reduced to 7.5 mg BID on \par Prednisone 2 mg daily- lowered on 23. \par \par Current Immunosuppressive Regimen:\par Tacrolimus 2 mg twice daily. \par \par Immunosuppression\par - prograf for goal trough 12-14.  2mg bid\par -cellcept 1000mg bid\par - prednisone taper\par \par Labs in hospital:\par egfr 60, LFT wnl, A1C 6.7 \par \par DIET:\par breakfast: toast/egg/fruit\par Lunch: rice/pasta with meat or sandwich/soup\par Dinner: low carb options as above, low sodium\par \par Exercise:\par Has therapist, walks outside, for 15 minutes or so at a time. \par \par  HTN \par - outpt BP goal < 130/80\par -on hydralazine and nifedipine\par - outpatient annual urinary microalb/cr ratio.-- states already checked. \par \par : HLD (hyperlipidemia).\par - LDL goal < 70\par - defer to primary team\par - outpatient fasting lipid profile\par -Rosuvastatin 5mg daily -- on with cardiology\par \par labs 22:\par GFR 85-- at time of biospy, doing blood work weekly. \par \par optho: had dilated eye exam 3/2021-- normal, other than reading glasses prescription, some blocked tear ducts. \par

## 2023-01-26 NOTE — PHYSICAL EXAM
[Alert] : alert [No Acute Distress] : no acute distress [Normal Hearing] : hearing was normal [No Neck Mass] : no neck mass was observed [No LAD] : no lymphadenopathy [Thyroid Not Enlarged] : the thyroid was not enlarged [No Respiratory Distress] : no respiratory distress [No Accessory Muscle Use] : no accessory muscle use [Clear to Auscultation] : lungs were clear to auscultation bilaterally [Normal PMI] : the apical impulse was normal [Normal S1, S2] : normal S1 and S2 [Normal Bowel Sounds] : normal bowel sounds [Not Tender] : non-tender [Soft] : abdomen soft [Oriented x3] : oriented to person, place, and time [Normal Affect] : the affect was normal [Normal Insight/Judgement] : insight and judgment were intact [Acanthosis Nigricans] : no acanthosis nigricans [de-identified] : tachycardic 112

## 2023-01-26 NOTE — ASSESSMENT
[FreeTextEntry1] : 66M male with PMHx HTN, HLD, type 2 DM, chronic HFrEF, (EF 25-30% by Echo) who underwent OHT on 6/21/22, currently on tacrolimus 2mg BID and prednisone 7.5mg BID, presents for initial patient consult s/p transplant for steroid/transplant induced diabetes mellitus. \par \par \par 1. Steroid/transplant induced diabetes mellitus.\par A1c 6.7% previously\par A1c today 1/26/23: 5.5%\par Prednisone reduced on 1/4/23 to 2mg daily. \par \par Plan:\par Hold Levemir  6 units QHS\par Call me if AM numbers trend above 130. \par Continue Metformin ER 500mg 2 tablets BID \par Monitor FS pre-meal. \par Discussed with patient effects of tacrolimus on pancreatic beta cell function, and that we will have to assess his response to steroid taper and long-term use of tacrolimus, he verbalizes understanding. \par \par Optho referral given to patient with number, needs dilated eye exam. \par Discussed future options for potential GLP-1 agonist, will discuss with cardiology. \par

## 2023-01-31 ENCOUNTER — APPOINTMENT (OUTPATIENT)
Dept: CARDIOLOGY | Facility: CLINIC | Age: 68
End: 2023-01-31

## 2023-02-02 ENCOUNTER — APPOINTMENT (OUTPATIENT)
Dept: CARDIOLOGY | Facility: CLINIC | Age: 68
End: 2023-02-02

## 2023-02-07 ENCOUNTER — APPOINTMENT (OUTPATIENT)
Dept: CARDIOLOGY | Facility: CLINIC | Age: 68
End: 2023-02-07

## 2023-02-09 ENCOUNTER — APPOINTMENT (OUTPATIENT)
Dept: CARDIOLOGY | Facility: CLINIC | Age: 68
End: 2023-02-09

## 2023-02-14 ENCOUNTER — APPOINTMENT (OUTPATIENT)
Dept: CARDIOLOGY | Facility: CLINIC | Age: 68
End: 2023-02-14

## 2023-02-15 NOTE — PROGRESS NOTE ADULT - PROBLEM SELECTOR PLAN 2
- will be transitioned to oral tacro, will adjust tacro as needed for goal 12-14  - keep solumedrol taper at current dose, when renal function improves will continue taper. currently on Solumedrol 36 mg IV BID   - remains on cellcept 1000 mg IV BID Detail Level: Zone Detail Level: Simple

## 2023-02-16 ENCOUNTER — APPOINTMENT (OUTPATIENT)
Dept: CARDIOLOGY | Facility: CLINIC | Age: 68
End: 2023-02-16

## 2023-02-21 ENCOUNTER — APPOINTMENT (OUTPATIENT)
Dept: CARDIOLOGY | Facility: CLINIC | Age: 68
End: 2023-02-21

## 2023-02-23 ENCOUNTER — APPOINTMENT (OUTPATIENT)
Dept: CARDIOLOGY | Facility: CLINIC | Age: 68
End: 2023-02-23

## 2023-02-27 LAB
ALBUMIN SERPL ELPH-MCNC: 5 G/DL
ALP BLD-CCNC: 89 U/L
ALT SERPL-CCNC: 12 U/L
ANION GAP SERPL CALC-SCNC: 12 MMOL/L
AST SERPL-CCNC: 13 U/L
BASOPHILS # BLD AUTO: 0.04 K/UL
BASOPHILS NFR BLD AUTO: 0.5 %
BILIRUB SERPL-MCNC: 0.9 MG/DL
BUN SERPL-MCNC: 26 MG/DL
CALCIUM SERPL-MCNC: 10.7 MG/DL
CHLORIDE SERPL-SCNC: 103 MMOL/L
CMV DNA SPEC QL NAA+PROBE: NOT DETECTED IU/ML
CMVPCR LOG: NOT DETECTED LOG10IU/ML
CO2 SERPL-SCNC: 27 MMOL/L
CREAT SERPL-MCNC: 1.28 MG/DL
EGFR: 62 ML/MIN/1.73M2
EOSINOPHIL # BLD AUTO: 0.12 K/UL
EOSINOPHIL NFR BLD AUTO: 1.6 %
GLUCOSE SERPL-MCNC: 97 MG/DL
HCT VFR BLD CALC: 47.8 %
HGB BLD-MCNC: 15.6 G/DL
IMM GRANULOCYTES NFR BLD AUTO: 0.3 %
LYMPHOCYTES # BLD AUTO: 2.02 K/UL
LYMPHOCYTES NFR BLD AUTO: 26.3 %
MAGNESIUM SERPL-MCNC: 1.7 MG/DL
MAN DIFF?: NORMAL
MCHC RBC-ENTMCNC: 30.1 PG
MCHC RBC-ENTMCNC: 32.6 GM/DL
MCV RBC AUTO: 92.3 FL
MONOCYTES # BLD AUTO: 0.46 K/UL
MONOCYTES NFR BLD AUTO: 6 %
NEUTROPHILS # BLD AUTO: 5.02 K/UL
NEUTROPHILS NFR BLD AUTO: 65.3 %
PLATELET # BLD AUTO: 200 K/UL
POTASSIUM SERPL-SCNC: 4.6 MMOL/L
PROT SERPL-MCNC: 7.5 G/DL
RBC # BLD: 5.18 M/UL
RBC # FLD: 12.4 %
SODIUM SERPL-SCNC: 142 MMOL/L
TACROLIMUS SERPL-MCNC: 10 NG/ML
WBC # FLD AUTO: 7.68 K/UL

## 2023-02-28 ENCOUNTER — APPOINTMENT (OUTPATIENT)
Dept: CARDIOLOGY | Facility: CLINIC | Age: 68
End: 2023-02-28

## 2023-03-02 ENCOUNTER — APPOINTMENT (OUTPATIENT)
Dept: CARDIOLOGY | Facility: CLINIC | Age: 68
End: 2023-03-02

## 2023-03-02 RX ORDER — PREDNISONE 1 MG/1
1 TABLET ORAL DAILY
Qty: 180 | Refills: 3 | Status: DISCONTINUED | COMMUNITY
Start: 2022-10-21 | End: 2023-03-02

## 2023-03-07 ENCOUNTER — APPOINTMENT (OUTPATIENT)
Dept: CARDIOLOGY | Facility: CLINIC | Age: 68
End: 2023-03-07

## 2023-03-15 ENCOUNTER — APPOINTMENT (OUTPATIENT)
Dept: HEART FAILURE | Facility: CLINIC | Age: 68
End: 2023-03-15
Payer: MEDICARE

## 2023-03-15 ENCOUNTER — OUTPATIENT (OUTPATIENT)
Dept: OUTPATIENT SERVICES | Facility: HOSPITAL | Age: 68
LOS: 1 days | End: 2023-03-15
Payer: COMMERCIAL

## 2023-03-15 ENCOUNTER — NON-APPOINTMENT (OUTPATIENT)
Age: 68
End: 2023-03-15

## 2023-03-15 ENCOUNTER — APPOINTMENT (OUTPATIENT)
Dept: CV DIAGNOSITCS | Facility: HOSPITAL | Age: 68
End: 2023-03-15

## 2023-03-15 VITALS
OXYGEN SATURATION: 98 % | DIASTOLIC BLOOD PRESSURE: 94 MMHG | HEIGHT: 70 IN | TEMPERATURE: 98.2 F | SYSTOLIC BLOOD PRESSURE: 148 MMHG | HEART RATE: 103 BPM | WEIGHT: 155 LBS | BODY MASS INDEX: 22.19 KG/M2

## 2023-03-15 DIAGNOSIS — Z98.890 OTHER SPECIFIED POSTPROCEDURAL STATES: Chronic | ICD-10-CM

## 2023-03-15 DIAGNOSIS — Z94.1 HEART TRANSPLANT STATUS: ICD-10-CM

## 2023-03-15 DIAGNOSIS — Z95.0 PRESENCE OF CARDIAC PACEMAKER: Chronic | ICD-10-CM

## 2023-03-15 PROCEDURE — 93000 ELECTROCARDIOGRAM COMPLETE: CPT

## 2023-03-15 PROCEDURE — 93306 TTE W/DOPPLER COMPLETE: CPT | Mod: 26

## 2023-03-15 PROCEDURE — 99215 OFFICE O/P EST HI 40 MIN: CPT | Mod: 25

## 2023-03-15 PROCEDURE — 93306 TTE W/DOPPLER COMPLETE: CPT

## 2023-03-15 RX ORDER — INSULIN DETEMIR 100 [IU]/ML
100 INJECTION, SOLUTION SUBCUTANEOUS
Qty: 45 | Refills: 3 | Status: COMPLETED | COMMUNITY
Start: 2022-06-29 | End: 2023-01-26

## 2023-03-15 RX ORDER — PANTOPRAZOLE 40 MG/1
40 TABLET, DELAYED RELEASE ORAL
Qty: 90 | Refills: 3 | Status: DISCONTINUED | COMMUNITY
Start: 2022-06-29 | End: 2023-03-15

## 2023-03-17 LAB
ALBUMIN SERPL ELPH-MCNC: 5.1 G/DL
ALP BLD-CCNC: 92 U/L
ALT SERPL-CCNC: 14 U/L
ANION GAP SERPL CALC-SCNC: 15 MMOL/L
AST SERPL-CCNC: 13 U/L
BASOPHILS # BLD AUTO: 0.06 K/UL
BASOPHILS NFR BLD AUTO: 0.7 %
BILIRUB SERPL-MCNC: 1 MG/DL
BUN SERPL-MCNC: 27 MG/DL
CALCIUM SERPL-MCNC: 10.5 MG/DL
CHLORIDE SERPL-SCNC: 102 MMOL/L
CO2 SERPL-SCNC: 23 MMOL/L
CREAT SERPL-MCNC: 1.34 MG/DL
EGFR: 59 ML/MIN/1.73M2
EOSINOPHIL # BLD AUTO: 0.1 K/UL
EOSINOPHIL NFR BLD AUTO: 1.2 %
GLUCOSE SERPL-MCNC: 106 MG/DL
HCT VFR BLD CALC: 47.7 %
HGB BLD-MCNC: 15.5 G/DL
IMM GRANULOCYTES NFR BLD AUTO: 0.2 %
LYMPHOCYTES # BLD AUTO: 2.35 K/UL
LYMPHOCYTES NFR BLD AUTO: 29 %
MAGNESIUM SERPL-MCNC: 1.7 MG/DL
MAN DIFF?: NORMAL
MCHC RBC-ENTMCNC: 30.7 PG
MCHC RBC-ENTMCNC: 32.5 GM/DL
MCV RBC AUTO: 94.5 FL
MONOCYTES # BLD AUTO: 0.51 K/UL
MONOCYTES NFR BLD AUTO: 6.3 %
NEUTROPHILS # BLD AUTO: 5.07 K/UL
NEUTROPHILS NFR BLD AUTO: 62.6 %
PLATELET # BLD AUTO: 231 K/UL
POTASSIUM SERPL-SCNC: 4.5 MMOL/L
PROT SERPL-MCNC: 7.5 G/DL
RBC # BLD: 5.05 M/UL
RBC # FLD: 13.2 %
SODIUM SERPL-SCNC: 140 MMOL/L
TACROLIMUS SERPL-MCNC: 14.1 NG/ML
WBC # FLD AUTO: 8.11 K/UL

## 2023-03-20 ENCOUNTER — APPOINTMENT (OUTPATIENT)
Dept: CARDIOLOGY | Facility: CLINIC | Age: 68
End: 2023-03-20
Payer: MEDICARE

## 2023-03-20 PROCEDURE — 93798 PHYS/QHP OP CAR RHAB W/ECG: CPT

## 2023-03-20 NOTE — HISTORY OF PRESENT ILLNESS
[Type II Diabetes] : Type II Diabetes [Yes ___ How many times per day?] : Yes, [unfilled] times per day [Is Patient aware of signs and symptoms of hypoglycemia and hyperglycemia?] : patient is aware of signs and symptoms of hypoglycemia and hyperglycemia [Following diabetes way of eating] : following diabetes way of eating [Patient will verbalize sign and symptoms of low blood sugar and high blood sugar] : Patient will verbalize sign and symptoms of low blood sugar and high blood sugar [Overview of diabetes and cardiovascular disease] : overview of diabetes and cardiovascular disease [Diagnosis of prediabetes, diabetes] : diagnosis of prediabetes, diabetes [Hypoglycemia and Hyperglycemia: sign and symptoms] : Hypoglycemia and Hyperglycemia: sign and symptoms [Role of lifestyle behaviors in diabetes management] : role of lifestyle behaviors in diabetes management [Medications to treat diabetes] : medications to treat diabetes [Yes, continue with Diabetes plan] : Yes, continue with Diabetes plan [Is Patient adherent with medication?] : patient is adherent with medication [Does patient monitor blood pressure at home?] : patient monitors blood pressure at home [Low sodium diet] : low sodium diet [Other diet strategies] : other diet strategies [Moderate alcohol intake] : moderate alcohol intake [Stress management techniques] : stress management techniques [Weight management] : weight management [Guidelines for blood pressure management] : guidelines for blood pressure management [Define hypertension] : define hypertension [Processed food] : processed food [Eating out] : eating out [Take out] : take out [Yes, continue with Hypertension plan] : Yes, continue with Hypertension plan [Height: ___] : Height: [unfilled] [Monitoring of weight at home and at cardiac rehabilitation] : Monitoring of weight at home and at cardiac rehabilitation [Individualized exercise program as developed at cardiac rehabilitation] : Individualized exercise program as developed at cardiac rehabilitation [Calories and healthy weight management] : Calories and healthy weight management [Exercise and diet] : exercise and diet [Weight gain and heart failure implications] : weight gain and heart failure implications [Yes, continue with Weight Management plan] : Yes, continue with weight management plan [None] : none [Heart transplant recipient] : heart transplant recipient [Hypertension] : hypertension [Diabetes Mellitus] : diabetes mellitus [Other restrictions: ____] : [unfilled] [Cardiac Rehabilitation] : Cardiac Rehabilitation [___ Days per week] : [unfilled] days per week [>= 31 minutes per session] : >= 31 minutes per session [Target RPE: ___] : Target RPE: [unfilled] [Treadmill] : treadmill [Recumbent bike] : recumbent bike [Upright exercise bike] : upright exercise bike [BioStep] : BioStep [Upper body ergometer] : upper body ergometer [Assess in ___ weeks] : assess in [unfilled] weeks [Stretching/ROM exercises and balance exercises daily] : Stretching/ROM exercises and balance exercises daily [Will assess for musculoskeletal and other restrictions] : will assess for musculoskeletal and other restrictions [Perform aerobic activity for ___ consecutive minutes] : perform aerobic activity for [unfilled] consecutive minutes [To start resistance training] : to start resistance training [Individualized Exercise Rx] : individualized exercise Rx [Yes, per exercise prescription, policy] : Yes, per exercise prescription, policy [Feelings Score: ___] : Feelings Score: [unfilled] [Physical Fitness Score: ____] : Physical Fitness Score: [unfilled] [Daily Activities Score: ___] : Daily Activities Score: [unfilled] [Social Activities Score: ___] : Social Activities Score: [unfilled] [Pain Score: ___] : Pain Score: [unfilled] [Overall Health Score: ___] : Overall Health Score: [unfilled] [Change in Health Score: ___] : Change in Health Score: [unfilled] [Quality of Life Score: ___] : Quality of Life Score: [unfilled] [Social Support Score: ___] : Social Support Score: [unfilled] [Has the patient given CR team permission to speak with the emergency  about the patient?] : Has the patient given CR team permission to speak with the emergency  about the patient? Yes [Does patient have advance directive?] : Does patient have advance directive? Yes [Self-reports of improved psychosocial well-being] : Self-reports of improved psychosocial well-being [Discuss overview of emotional health supportive modalities] : Discuss overview of emotional health supportive modalities [Mindfulness] : mindfulness [Meditation] : meditation [Relaxation breathing techniques] : relaxation breathing techniques [Stress management] : stress management [Yes, continue with psychosocial plan] : Yes, continue with psychosocial plan [Action] : Stage of change: Action [Rate your plate score: ____] : Rate your plate score: [unfilled] [Diabetes] : Diabetes [Sugar] : sugar [Sodium] : sodium [Is patient on medication for hyperlipidemia?] : Is patient on medication for hyperlipidemia? Yes [Rosuvastatin] : rosuvastatin [Significant other] : significant other [Discuss an overview of healthy eating plan] : Discuss an overview of healthy eating plan [Teach back utilized] : teach back utilized [Yes, continue with nutrition plan] : Yes, continue with nutrition plan [In progress] : in progress [PHQ-9: ___] : PHQ-9: [unfilled] [[facility]: ___] : [facility] total: [unfilled] [Patient will have a blood pressure < 130/80 mmHg] : patient will have a blood pressure < 130/80 mmHg [Exercise and blood pressure] : exercise and blood pressure [BP: ____ mmHg] : BP: [unfilled] mmHg [HR: ____ bpm] : BP: [unfilled] bpm [O2sat: ____ %] : O2sat: [unfilled] % [RPE: ____] : RPE: [unfilled]  [METS: ____] : METS: [unfilled]  [Stair Climber] : stair climber [Walking] : walking [Equipment Orientation/Safety] : equipment orientation/safety [Exercise Benefits/Guidelines education] : exercise benefits/guidelines education [Signs/Symptoms to report] : signs/symptoms to report [Hemodynamic responses] : hemodynamic responses [Patient verbalizes understanding of exercise education all questions answered] : Patient verbalizes understanding of exercise education all questions answered [Return demonstration] : return demonstration [FreeTextEntry2] : 90 [FreeTextEntry4] : pt no longer takes insulin, does take  metformin [FreeTextEntry3] : Patient weight will be maintained [Angina with exercise] : no angina with exercise [Fall Risk] : no fall risk [] : no [FreeTextEntry1] : Dr. Nuñez [FreeTextEntry5] : Dr. Rizwan Ferrara [de-identified] : METS:\par Session #1: TM 3.2; RB 3.3 [de-identified] : exercise progression as per guidelines [FreeTextEntry6] : none [FreeTextEntry8] : Patient report heart healthy eating as advised by transplant team. Wife does the cooking.  [FreeTextEntry7] : Continuation of heart healthy eating

## 2023-03-20 NOTE — PLAN
[FreeTextEntry1] : Cardiac Rehab Phase 2\par Discussed program; all questions answered.\par ITP -confer sergey Gamez\par Patient's start date today.\par See session report for details\par \par \par

## 2023-03-20 NOTE — CARDIOLOGY SUMMARY
[de-identified] : \par 9/22/22 ECG: , RBBB,when compared to EKG 8/2/22 no significant changes\par 8/2/22 ECG: NSR 95bpm, RBBB, when compared to EKG 7/20/22 no significant changes\par 7/20/22 ECG: NSR at 100bpm RBBB\par 7/13/22 ECG: NSR at 94 bpm, RBBB. [de-identified] : \par TTE 8/2/22: limited study in the setting of an RV biopsy\par 1. Normal right ventricular size and function.\par 2. Small pericardiall effusion.\par A bioptome is noted in the right heart. No change to the size of the pericardial effusion or RV function\par 7/20/22: TTE: limited study: hyperdynamic LVSF, normal RV size and function, normal pericardium with trace to small pericardial effusion adjacent to RV\par 07/05/22 TTE: limited study: normal biV function, small loculated pericardial effusion at the LV apex, measuring approx 1.0 cm seen both pre and post images. [de-identified] : \par 6/28: RA 9, RV 3/11, PA 62/27/41, wedge 21 with v wave 31, PA sat 69.3%, /81/102, CO/Ci 6.87/3.62, grade 1R/2\par 7/5: RA 2, RV 35/3, PA 36/18/26, Wedge 15, PA sat 71.7, Kristy CO/CI 5.4/2.8, /75/93, SVR 1345 dsc, 1R/1A\par 7/13: RA 3, PAP 36/14/24 PCW 8, Pa sat 71%, Kristy CO/CI 6.1/3.2, EMBx ISHLT Grade 1R/1A\par 7/20: RA 3 PAP 47/20/30 PCW 15 PaSat 67% Kristy CO/CI 5.9/3.2 EMBx ISHLT Grade 1R/1A\par 8/2: RA 4, PAP 35/16/25 PCW 12 Pasat 68% Kristy CO/CI 5.3/2.9, EMBx ISHLT Grade 1R/1A

## 2023-03-20 NOTE — PHYSICAL EXAM
[Well Nourished] : well nourished [Well-Appearing] : well-appearing [No JVD] : no jugular venous distention [Supple] : supple [No Accessory Muscle Use] : no accessory muscle use [Clear to Auscultation] : lungs were clear to auscultation bilaterally [Regular Rhythm] : with a regular rhythm [Normal S1, S2] : normal S1 and S2 [Soft] : abdomen soft [Grossly Normal Strength/Tone] : grossly normal strength/tone [Non Tender] : non-tender [No Focal Deficits] : no focal deficits [Normal Gait] : normal gait [Normal Affect] : the affect was normal [de-identified] : OHT incision healed

## 2023-03-20 NOTE — REVIEW OF SYSTEMS
[Fatigue] : fatigue [Recent Change In Weight] : ~T recent weight change [Dyspnea on Exertion] : dyspnea on exertion [Negative] : Integumentary [Fever] : no fever [Chills] : no chills [Earache] : no earache [Chest Pain] : no chest pain [Palpitations] : no palpitations [Claudication] : no  leg claudication [Lower Ext Edema] : no lower extremity edema [Orthopena] : no orthopnea [Paroxysmal Nocturnal Dyspnea] : no paroxysmal nocturnal dyspnea [Abdominal Pain] : no abdominal pain [Nausea] : no nausea [Constipation] : no constipation [Diarrhea] : no diarrhea [Vomiting] : no vomiting [Heartburn] : no heartburn [Nocturia] : no nocturia [Itching] : no itching [Skin Rash] : no skin rash [Headache] : no headache [Dizziness] : no dizziness [Fainting] : no fainting [Confusion] : no confusion [Unsteady Walk] : no ataxia [Anxiety] : no anxiety [Depression] : no depression [FreeTextEntry6] : with stairs [FreeTextEntry9] : right knee with discomfort while climbing upstairs [FreeTextEntry8] : nocturia x1 [de-identified] : incision healed well as per patient

## 2023-03-20 NOTE — CARDIOLOGY SUMMARY
[de-identified] : \par TTE 8/2/22: limited study in the setting of an RV biopsy\par 1. Normal right ventricular size and function.\par 2. Small pericardiall effusion.\par A bioptome is noted in the right heart. No change to the size of the pericardial effusion or RV function\par 7/20/22: TTE: limited study: hyperdynamic LVSF, normal RV size and function, normal pericardium with trace to small pericardial effusion adjacent to RV\par 07/05/22 TTE: limited study: normal biV function, small loculated pericardial effusion at the LV apex, measuring approx 1.0 cm seen both pre and post images. [de-identified] : \par 9/22/22 ECG: , RBBB,when compared to EKG 8/2/22 no significant changes\par 8/2/22 ECG: NSR 95bpm, RBBB, when compared to EKG 7/20/22 no significant changes\par 7/20/22 ECG: NSR at 100bpm RBBB\par 7/13/22 ECG: NSR at 94 bpm, RBBB. [de-identified] : \par 6/28: RA 9, RV 3/11, PA 62/27/41, wedge 21 with v wave 31, PA sat 69.3%, /81/102, CO/Ci 6.87/3.62, grade 1R/2\par 7/5: RA 2, RV 35/3, PA 36/18/26, Wedge 15, PA sat 71.7, Kristy CO/CI 5.4/2.8, /75/93, SVR 1345 dsc, 1R/1A\par 7/13: RA 3, PAP 36/14/24 PCW 8, Pa sat 71%, Kristy CO/CI 6.1/3.2, EMBx ISHLT Grade 1R/1A\par 7/20: RA 3 PAP 47/20/30 PCW 15 PaSat 67% Kristy CO/CI 5.9/3.2 EMBx ISHLT Grade 1R/1A\par 8/2: RA 4, PAP 35/16/25 PCW 12 Pasat 68% Kristy CO/CI 5.3/2.9, EMBx ISHLT Grade 1R/1A

## 2023-03-20 NOTE — HISTORY OF PRESENT ILLNESS
[Type II Diabetes] : Type II Diabetes [Yes ___ How many times per day?] : Yes, [unfilled] times per day [Is Patient aware of signs and symptoms of hypoglycemia and hyperglycemia?] : patient is aware of signs and symptoms of hypoglycemia and hyperglycemia [Following diabetes way of eating] : following diabetes way of eating [Patient will verbalize sign and symptoms of low blood sugar and high blood sugar] : Patient will verbalize sign and symptoms of low blood sugar and high blood sugar [Overview of diabetes and cardiovascular disease] : overview of diabetes and cardiovascular disease [Diagnosis of prediabetes, diabetes] : diagnosis of prediabetes, diabetes [Hypoglycemia and Hyperglycemia: sign and symptoms] : Hypoglycemia and Hyperglycemia: sign and symptoms [Role of lifestyle behaviors in diabetes management] : role of lifestyle behaviors in diabetes management [Medications to treat diabetes] : medications to treat diabetes [Yes, continue with Diabetes plan] : Yes, continue with Diabetes plan [Is Patient adherent with medication?] : patient is adherent with medication [Does patient monitor blood pressure at home?] : patient monitors blood pressure at home [Low sodium diet] : low sodium diet [Other diet strategies] : other diet strategies [Moderate alcohol intake] : moderate alcohol intake [Stress management techniques] : stress management techniques [Weight management] : weight management [Guidelines for blood pressure management] : guidelines for blood pressure management [Define hypertension] : define hypertension [Processed food] : processed food [Eating out] : eating out [Take out] : take out [Yes, continue with Hypertension plan] : Yes, continue with Hypertension plan [Height: ___] : Height: [unfilled] [Monitoring of weight at home and at cardiac rehabilitation] : Monitoring of weight at home and at cardiac rehabilitation [Individualized exercise program as developed at cardiac rehabilitation] : Individualized exercise program as developed at cardiac rehabilitation [Calories and healthy weight management] : Calories and healthy weight management [Exercise and diet] : exercise and diet [Weight gain and heart failure implications] : weight gain and heart failure implications [Yes, continue with Weight Management plan] : Yes, continue with weight management plan [None] : none [Heart transplant recipient] : heart transplant recipient [Hypertension] : hypertension [Diabetes Mellitus] : diabetes mellitus [Other restrictions: ____] : [unfilled] [Cardiac Rehabilitation] : Cardiac Rehabilitation [___ Days per week] : [unfilled] days per week [>= 31 minutes per session] : >= 31 minutes per session [Target RPE: ___] : Target RPE: [unfilled] [Treadmill] : treadmill [Recumbent bike] : recumbent bike [Upright exercise bike] : upright exercise bike [BioStep] : BioStep [Upper body ergometer] : upper body ergometer [Assess in ___ weeks] : assess in [unfilled] weeks [Stretching/ROM exercises and balance exercises daily] : Stretching/ROM exercises and balance exercises daily [Will assess for musculoskeletal and other restrictions] : will assess for musculoskeletal and other restrictions [Perform aerobic activity for ___ consecutive minutes] : perform aerobic activity for [unfilled] consecutive minutes [To start resistance training] : to start resistance training [Individualized Exercise Rx] : individualized exercise Rx [Yes, per exercise prescription, policy] : Yes, per exercise prescription, policy [Feelings Score: ___] : Feelings Score: [unfilled] [Physical Fitness Score: ____] : Physical Fitness Score: [unfilled] [Daily Activities Score: ___] : Daily Activities Score: [unfilled] [Social Activities Score: ___] : Social Activities Score: [unfilled] [Pain Score: ___] : Pain Score: [unfilled] [Overall Health Score: ___] : Overall Health Score: [unfilled] [Change in Health Score: ___] : Change in Health Score: [unfilled] [Quality of Life Score: ___] : Quality of Life Score: [unfilled] [Social Support Score: ___] : Social Support Score: [unfilled] [Has the patient given CR team permission to speak with the emergency  about the patient?] : Has the patient given CR team permission to speak with the emergency  about the patient? Yes [Does patient have advance directive?] : Does patient have advance directive? Yes [Self-reports of improved psychosocial well-being] : Self-reports of improved psychosocial well-being [Discuss overview of emotional health supportive modalities] : Discuss overview of emotional health supportive modalities [Mindfulness] : mindfulness [Meditation] : meditation [Relaxation breathing techniques] : relaxation breathing techniques [Stress management] : stress management [Yes, continue with psychosocial plan] : Yes, continue with psychosocial plan [Action] : Stage of change: Action [Rate your plate score: ____] : Rate your plate score: [unfilled] [Diabetes] : Diabetes [Sugar] : sugar [Sodium] : sodium [Is patient on medication for hyperlipidemia?] : Is patient on medication for hyperlipidemia? Yes [Rosuvastatin] : rosuvastatin [Significant other] : significant other [Discuss an overview of healthy eating plan] : Discuss an overview of healthy eating plan [Teach back utilized] : teach back utilized [Yes, continue with nutrition plan] : Yes, continue with nutrition plan [In progress] : in progress [PHQ-9: ___] : PHQ-9: [unfilled] [[facility]: ___] : [facility] total: [unfilled] [Patient will have a blood pressure < 130/80 mmHg] : patient will have a blood pressure < 130/80 mmHg [Exercise and blood pressure] : exercise and blood pressure [BP: ____ mmHg] : BP: [unfilled] mmHg [HR: ____ bpm] : BP: [unfilled] bpm [O2sat: ____ %] : O2sat: [unfilled] % [RPE: ____] : RPE: [unfilled]  [METS: ____] : METS: [unfilled]  [Stair Climber] : stair climber [Walking] : walking [Equipment Orientation/Safety] : equipment orientation/safety [Exercise Benefits/Guidelines education] : exercise benefits/guidelines education [Signs/Symptoms to report] : signs/symptoms to report [Hemodynamic responses] : hemodynamic responses [Patient verbalizes understanding of exercise education all questions answered] : Patient verbalizes understanding of exercise education all questions answered [Return demonstration] : return demonstration [FreeTextEntry2] : 90 [FreeTextEntry4] : pt no longer takes insulin, does take  metformin [FreeTextEntry3] : Patient weight will be maintained [Angina with exercise] : no angina with exercise [Fall Risk] : no fall risk [] : no [FreeTextEntry1] : Dr. Nuñez [FreeTextEntry5] : Dr. Rizwan Ferrara [de-identified] : METS:\par Session #1: TM 3.2; RB 3.3 [de-identified] : exercise progression as per guidelines [FreeTextEntry6] : none [FreeTextEntry8] : Patient report heart healthy eating as advised by transplant team. Wife does the cooking.  [FreeTextEntry7] : Continuation of heart healthy eating

## 2023-03-20 NOTE — REVIEW OF SYSTEMS
[Fatigue] : fatigue [Recent Change In Weight] : ~T recent weight change [Dyspnea on Exertion] : dyspnea on exertion [Negative] : Integumentary [Fever] : no fever [Chills] : no chills [Earache] : no earache [Chest Pain] : no chest pain [Palpitations] : no palpitations [Claudication] : no  leg claudication [Lower Ext Edema] : no lower extremity edema [Orthopena] : no orthopnea [Paroxysmal Nocturnal Dyspnea] : no paroxysmal nocturnal dyspnea [Abdominal Pain] : no abdominal pain [Nausea] : no nausea [Constipation] : no constipation [Diarrhea] : no diarrhea [Vomiting] : no vomiting [Heartburn] : no heartburn [Nocturia] : no nocturia [Itching] : no itching [Skin Rash] : no skin rash [Headache] : no headache [Dizziness] : no dizziness [Fainting] : no fainting [Confusion] : no confusion [Unsteady Walk] : no ataxia [Anxiety] : no anxiety [Depression] : no depression [FreeTextEntry6] : with stairs [FreeTextEntry9] : right knee with discomfort while climbing upstairs [FreeTextEntry8] : nocturia x1 [de-identified] : incision healed well as per patient

## 2023-03-20 NOTE — REASON FOR VISIT
[Assessment] : an assessment [Initial Assessment] : an initial assessment [FreeTextEntry1] : ITP to be reviewed and manually signed by Dr. Vasquez;  Clinical indication for cardiac rehabilitation: s/p OHT

## 2023-03-20 NOTE — PHYSICAL EXAM
[Well Nourished] : well nourished [Well-Appearing] : well-appearing [No JVD] : no jugular venous distention [Supple] : supple [No Accessory Muscle Use] : no accessory muscle use [Clear to Auscultation] : lungs were clear to auscultation bilaterally [Regular Rhythm] : with a regular rhythm [Normal S1, S2] : normal S1 and S2 [Soft] : abdomen soft [Grossly Normal Strength/Tone] : grossly normal strength/tone [Non Tender] : non-tender [No Focal Deficits] : no focal deficits [Normal Gait] : normal gait [Normal Affect] : the affect was normal [de-identified] : OHT incision healed

## 2023-03-22 ENCOUNTER — APPOINTMENT (OUTPATIENT)
Dept: CARDIOLOGY | Facility: CLINIC | Age: 68
End: 2023-03-22
Payer: MEDICARE

## 2023-03-22 PROCEDURE — 93798 PHYS/QHP OP CAR RHAB W/ECG: CPT

## 2023-03-23 ENCOUNTER — APPOINTMENT (OUTPATIENT)
Dept: CARDIOLOGY | Facility: CLINIC | Age: 68
End: 2023-03-23
Payer: MEDICARE

## 2023-03-23 PROCEDURE — 93798 PHYS/QHP OP CAR RHAB W/ECG: CPT

## 2023-03-23 NOTE — CARDIOLOGY SUMMARY
[de-identified] : \par 03/15/23: ECG SR 98bpm, RBBB, when compared to EKG 10/20/22 no significant changes\par 10/20/22: ECG NSR 88bpm, RBBB, when compared to EKG 9/22/22 no significant changes\par 9/22/22 ECG: , RBBB,when compared to EKG 8/2/22 no significant changes\par 8/2/22 ECG: NSR 95bpm, RBBB, when compared to EKG 7/20/22 no significant changes\par 7/20/22 ECG: NSR at 100bpm RBBB\par 7/13/22 ECG: NSR at 94 bpm, RBBB. [de-identified] : \par 3/15/23 TTE EF 67% LVIDd 4.2cm, LVIDd 4.2cm, normal LVSF, normal RV function with decreased RVSF, mild TR, no pericardial effusion. Compared to TTE 1/4/23, RV size has improved, there is no change in RV function.\par 1/4/23 TTE EF 64% LVIDd 4.5cm, normal LVSF, Global strain -20%, decreased RV systolic function, no valvular abnormalities, no pericardial effusion. Copmared to TTE 11/30/22, no significant changes noted. \par 11/30/22 TTE EF 58% LVIDd 2.6, normal LFSV, decreased RV systolic function, RV wall strain -12.1%, no valvular abnormalities, no pericardial effusion\par 10/20/22 TTE limited: normal graft function. No pericardial effusion seen. \par TTE 8/2/22: limited study in the setting of an RV biopsy\par 1. Normal right ventricular size and function.\par 2. Small pericardiall effusion.\par A bioptome is noted in the right heart. No change to the size of the pericardial effusion or RV function\par 7/20/22: TTE: limited study: hyperdynamic LVSF, normal RV size and function, normal pericardium with trace to small pericardial effusion adjacent to RV\par 07/05/22 TTE: limited study: normal biV function, small loculated pericardial effusion at the LV apex, measuring approx 1.0 cm seen both pre and post images. [de-identified] : \par 6/28: RA 9, RV 3/11, PA 62/27/41, wedge 21 with v wave 31, PA sat 69.3%, /81/102, CO/Ci 6.87/3.62, grade 1R/2\par 7/5: RA 2, RV 35/3, PA 36/18/26, Wedge 15, PA sat 71.7, Kristy CO/CI 5.4/2.8, /75/93, SVR 1345 dsc, 1R/1A\par 7/13: RA 3, PAP 36/14/24 PCW 8, Pa sat 71%, Kristy CO/CI 6.1/3.2, EMBx ISHLT Grade 1R/1A\par 7/20: RA 3 PAP 47/20/30 PCW 15 PaSat 67% Kristy CO/CI 5.9/3.2 EMBx ISHLT Grade 1R/1A\par 8/2: RA 4, PAP 35/16/25 PCW 12 Pasat 68% Kristy CO/CI 5.3/2.9, EMBx ISHLT Grade 1R/1A\par 8/16: RA 5, PAP 42/19/27 PCW 15 Pasat 72% Kristy CO/CI 5.6/3.0, EMBx ISHLT Grade 0R\par 9/22: RA 5 PAP 41/18/28 PCW 16 PaSat 75% Kristy CO/CI 5.8/3.1, EMBx ISHLT Grade 1R/1A\par 10/20: RA 10/20/22 RA 2 RV 32/2 PA 33/9/21 PCWP 9 Kristy CO/CI 6.09/3.32 ACR 1R/1A, pAMR 0

## 2023-03-23 NOTE — END OF VISIT
[FreeTextEntry3] : I, Dr. Nuñez, personally performed the evaluation and management (E/M) services for this established patient who presents today with (a) new problem(s)/exacerbation of (an) existing condition(s).  That E/M includes conducting the examination, assessing all new/exacerbated conditions, and establishing a new plan of care.  Today, my REBECA, [employer], was here to observe my evaluation and management services for this new problem/exacerbated condition to be followed going forward.  [Time Spent: ___ minutes] : I have spent [unfilled] minutes of time on the encounter.

## 2023-03-23 NOTE — HISTORY OF PRESENT ILLNESS
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 LHC did not reveal new obstructive CAD and he underwent EPS which did not reveal endocardial substrate amenable for ablation. RHC revealed severely depressed cardiac output and he was transferred to [facility] 5/26 for advanced therapies evaluation. IABP was placed and he was listed UNOS status 2e for OHT. He was briefly made status 7 as he became febrile without leukocytosis, then bld cx negative x48hrs he was re-activated UNOS status 2e. \par \par A suitable donor was identified and patient underwent OHT with Dr. Earl/Maria C on 6/21/22 (ischemic time 261 mins, CMV +/+, Toxo -/-). Patient was successfully extubated and IABP was removed on POD 1.  Cultures from the ICD site in the OR were positive for staph epidermidis/morganella morganii and he was treated with IV Vancomycin and Cefepime. CT scan demonstrated residual fibrous capsule in the region of the previous cardiac device in the left chest wall. Pt was then discharged home on oral antibiotics on 7/8/22.\par \par Pt is here today for f/u clinic visit 9 months post transplant. Since last seen, he reports feeling more energized and doing well. He is walking 1 mile daily without limitations and starting cardiac rehab next week. He denies CP, SOB at rest, HSIEH, orthopnea, PND, LH/dizziness, abdominal discomfort, palpitations, and syncope. No tremor, fevers or chills. His appetite is normal. Sleeping well.  Home vital signs logs reviewed and average -120/80's, -150's mg/dL.

## 2023-03-23 NOTE — DISCUSSION/SUMMARY
[Patient] : the patient [___ Month(s)] : in [unfilled] month(s) [EKG obtained to assist in diagnosis and management of assessed problem(s)] : EKG obtained to assist in diagnosis and management of assessed problem(s) [FreeTextEntry2] : and his wife [FreeTextEntry1] : 65 YO M with a history of ACC/AHA Stage D mixed NICM/ICM (likely familial with strong FH and early arrhythmia history in his 30's) with LVED 5.2 cm and LVEF 10-15% s/p PPM upgraded to CRT-D, CAD s/p PCI to mLAD 4/2022, well controlled DM2 (A1c 6.2%), and CKD III (Cr 1.4) and VT who is now s/p OHT on 6/21/22 (ischemic time 261 mins, CMV +/+, Toxo -/-), doing clinically well.\par \par #s/p OHT\par - continue ASA 81mg daily and rosuvastatin 5mg daily for prevention TCAD. Last lipid panel 1/4/23: , , HDL 41, LDL 62. Repeat fasting lipid panel with next lab draw. Encouraged heart healthy diet.\par - 10/20/22 RHC showed normal hemodynamics and EMBx Grade 1R/1A\par - continue sildenafil 10mg TID for pulmonary hypertension pre transplant\par -today's allomap = 34 and allosure < 0.12; prior allomaps 28-32, all allosure's <0.12\par - This patient has a heart allograft and is at risk for rejection through immune system recognition of non-self tissue. AlloMap and AlloSure and noninvasive tests ordered to evaluate for the probability of rejection after pretest and assist in immunosuppression management. Studies have shown that these tests can help to rule out rejection without subjecting the patient to the bleeding, arrhythmia, and structural damage risks of invasive endomyocardial biopsies. The AlloMap and AlloSure tests help guide clinical decision making for this patient's surveillance schedule and immunosuppression adjustments.\par - cardiac rehab\par \par #Immunosuppression\par - continue prograf for goal trough 8-10 now that pt is 9 months post. Today's trough = 14.1, decrease dose to 1mg bid and repeat level in one week \par - on reduced dose cellcept to 500mg bid in the setting of pt having looser stools and WBC ~3K. \par - completed prednisone taper\par \par #prophylaxis\par - CMV +/+: completed 6 months of valcyte. CMV PCR last negative (2/23/23)\par - Toxo -/-: no ppx needed\par - PJP: continue mepron 1500mg daily (bactrim was not trialed due to hyperkalemia and borderline elevated SCr)\par - thrush: completed nystatin S&S qid until prednisone was <10mg daily\par \par #ICD pocket +staph epidermiditis/morganella morganii\par - leukocytosis resolved\par - completed Cefpodoxime through 7/15\par - completed Doxyclycine through 7/21\par - per discharge plans, if WBC continue to remain elevated will need to consider bringing patient back to OR for ICD pocket washout\par - CT C/A/P 7/5 showed residual fibrous capsule in the region of the previous cardiac device in the left chest wall. 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 Saeed consult and recommendations\par - discontinued Eliquis 2.5 mg PO BID on 11/11/22\par \par #CKD stable, SCr ~1.2, eGFR 66\par - avoid nephrotoxic agents\par - remain off diuretics\par \par #HTN\par - continue nifedipine 30mg daily\par - pt did not tolerate cardura due to hypotension\par - pt to maintain home BP log and notify our team if >140/90. Bring BP cuff to next visit to correlate with office BP machine.\par \par #hyperglycemia, DM Type 2 (diagnosed 5/2022, HgA1c 6.7%)\par - followed by endocrine, appreciate recs, insulin discontinued\par - continue metformin 1000mg bid \par - continue to monitor FS \par \par #Health maintenance\par - continue mag oxide and MVI\par - continue calcium citrate + vitamin D\par - continue PPI\par \par RTC, LHC with IVUS/TTE for 1st annual in June 2023.\par Continue monthly labs/heartcare

## 2023-03-23 NOTE — PHYSICAL EXAM
[No Xanthelasma] : no xanthelasma [Normal Venous Pressure] : normal venous pressure [Normal Radial B/L] : normal radial B/L [Normal] : alert and oriented, normal memory [de-identified] : not assessed - wearing a mask [de-identified] : JVP < 6 cm H2O, no HJR

## 2023-03-24 ENCOUNTER — NON-APPOINTMENT (OUTPATIENT)
Age: 68
End: 2023-03-24

## 2023-03-27 ENCOUNTER — APPOINTMENT (OUTPATIENT)
Dept: ENDOCRINOLOGY | Facility: CLINIC | Age: 68
End: 2023-03-27
Payer: MEDICARE

## 2023-03-27 VITALS
DIASTOLIC BLOOD PRESSURE: 70 MMHG | HEART RATE: 111 BPM | WEIGHT: 152 LBS | OXYGEN SATURATION: 99 % | HEIGHT: 70 IN | BODY MASS INDEX: 21.76 KG/M2 | SYSTOLIC BLOOD PRESSURE: 100 MMHG

## 2023-03-27 PROCEDURE — 99214 OFFICE O/P EST MOD 30 MIN: CPT

## 2023-03-27 NOTE — HISTORY OF PRESENT ILLNESS
[FreeTextEntry1] : 67M male with PMHx HTN, HLD, type 2 DM, chronic HFrEF, (EF 25-30% by Echo) who underwent OHT on 22. Endocrinology consulted for assistance with management of steroid induced hyperglycemia/dm2.\par \par Controlled type 2 diabetes mellitus with hyperglycemia.\par Diagnosis: Type 2 DM, diagnosed initially 2022. \par \par 23: A1c 5.5%\par \par Previous Medications\par Glimepiride 1 mg was not on steroids at that time. Diagnosed with diabetes 2022. \par \par Current Medications:\par Metformin  mg 2 tablets BID \par \par Previous Medications:\par Levemir  6 units QHS -- 5pm \par \par Meter Readings:\par See scanned logs.\par AM Fastin-136\par Pre lunch: 110-130\par pre-dinner: 110-132\par bedtime: 120-130\par Readings dependent on meal size. \par \par Current Steroid Regimen: Based on biopsy results\par -Prednisone tapered to 12.5 mg BID on  with BG at goal since dinner\par Reduced to 10 mg BID on \par Reduced to 7.5 mg BID on \par Prednisone 2 mg daily- lowered on 23. \par PRednisone taper completed, no longer on prednisone. \par \par Current Immunosuppressive Regimen:\par Currently on tacrolimus 1mg q AM 1 mg q PM\par \par Labs in hospital:\par egfr 60, LFT wnl, A1C 6.7 \par \par DIET:\par breakfast: toast/egg/fruit\par Lunch: rice/pasta with meat or sandwich/soup\par Dinner: low carb options as above, low sodium\par \par Exercise:\par Has therapist, walks outside, for 15 minutes or so at a time. \par \par  HTN \par - outpt BP goal < 130/80\par -on hydralazine and nifedipine\par - outpatient annual urinary microalb/cr ratio.-- states already checked. \par \par : HLD (hyperlipidemia).\par - LDL goal < 70\par -Rosuvastatin 5mg daily -- on with cardiology\par \par labs 22:\par GFR 85-- at time of biopsy, doing blood work weekly. \par \par optho: had dilated eye exam 3/2021-- normal, other than reading glasses prescription, some blocked tear ducts. \par

## 2023-03-27 NOTE — ASSESSMENT
[FreeTextEntry1] : 67 M male with PMHx HTN, HLD, type 2 DM, chronic HFrEF, (EF 25-30% by Echo) who underwent OHT on 6/21/22, currently on tacrolimus 1mg q AM 1 mg q PM, presents for follow up s/p transplant for steroid/transplant induced diabetes mellitus. \par \par 1. Steroid/transplant induced diabetes mellitus.\par A1c 6.7% previously\par A1c 1/26/23: 5.5%\par No longer on Prednisone. \par \par Plan:\par Reduce Metformin to 500 mg BID. Monitor numbers. Struggling with checking 4 times daily. I advise him he can check staggered AM fasting plus pre-lunch OR pre-dinner, OR bedtime and to do so in an alternating fashion so we can still trend the numbers. \par I offered and he declined CGM. \par Start Farxiga 5 mg daily. We expect transient fluctuations in GFR and creatinine. Ensure adequate hydration. \par Discussed case with Dr. Nuñez, they will monitor magnesium levels and decrease MgOx. \par RTC May 2023, will repeat A1c, consider titration down and off of Metformin and increase Farxiga. \par Monitor FS pre-meal. \par Pending discussion with cardiac transplant team \par \par Discussed with patient effects of tacrolimus on pancreatic beta cell function, and that we will have to assess his response to steroid taper and long-term use of tacrolimus, he verbalizes understanding. \par \par Optho referral given to patient with number, needs dilated eye exam. \par Discussed future options for potential GLP-1 agonist. We don't necessarily need weight reduction in this patient, although GLP would provide cardiac benefit. We decided this is not the best option for patient. \par \par - Discussed the risk of DKA and UTI.   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  Discussed that he/she should seek medical attention immediately if he/she experience any symptoms of tenderness, redness, or swelling of the genitals or the area from the genitals back to the rectum, and have a fever above 100.4 F or a general feeling of being unwell. These symptoms can worsen quickly, so it is important to seek treatment right away.

## 2023-03-27 NOTE — PHYSICAL EXAM
[Alert] : alert [No Acute Distress] : no acute distress [Normal Hearing] : hearing was normal [No Neck Mass] : no neck mass was observed [No LAD] : no lymphadenopathy [Thyroid Not Enlarged] : the thyroid was not enlarged [No Respiratory Distress] : no respiratory distress [No Accessory Muscle Use] : no accessory muscle use [Clear to Auscultation] : lungs were clear to auscultation bilaterally [Normal PMI] : the apical impulse was normal [Normal S1, S2] : normal S1 and S2 [Normal Bowel Sounds] : normal bowel sounds [Not Tender] : non-tender [Soft] : abdomen soft [Oriented x3] : oriented to person, place, and time [Normal Affect] : the affect was normal [Normal Insight/Judgement] : insight and judgment were intact [Acanthosis Nigricans] : no acanthosis nigricans [de-identified] : tachycardic 112

## 2023-03-29 ENCOUNTER — APPOINTMENT (OUTPATIENT)
Dept: CARDIOLOGY | Facility: CLINIC | Age: 68
End: 2023-03-29
Payer: MEDICARE

## 2023-03-29 PROCEDURE — 93797 PHYS/QHP OP CAR RHAB WO ECG: CPT

## 2023-03-30 ENCOUNTER — APPOINTMENT (OUTPATIENT)
Dept: CARDIOLOGY | Facility: CLINIC | Age: 68
End: 2023-03-30
Payer: MEDICARE

## 2023-03-30 PROCEDURE — 93797 PHYS/QHP OP CAR RHAB WO ECG: CPT

## 2023-04-03 ENCOUNTER — APPOINTMENT (OUTPATIENT)
Dept: CARDIOLOGY | Facility: CLINIC | Age: 68
End: 2023-04-03
Payer: MEDICARE

## 2023-04-03 LAB
ALBUMIN SERPL ELPH-MCNC: 4.9 G/DL
ALP BLD-CCNC: 98 U/L
ALT SERPL-CCNC: 13 U/L
ANION GAP SERPL CALC-SCNC: 16 MMOL/L
AST SERPL-CCNC: 15 U/L
BILIRUB SERPL-MCNC: 1 MG/DL
BUN SERPL-MCNC: 22 MG/DL
CALCIUM SERPL-MCNC: 10.2 MG/DL
CHLORIDE SERPL-SCNC: 103 MMOL/L
CO2 SERPL-SCNC: 22 MMOL/L
CREAT SERPL-MCNC: 1.28 MG/DL
EGFR: 62 ML/MIN/1.73M2
GLUCOSE SERPL-MCNC: 111 MG/DL
POTASSIUM SERPL-SCNC: 4.7 MMOL/L
PROT SERPL-MCNC: 7.2 G/DL
SODIUM SERPL-SCNC: 141 MMOL/L
TACROLIMUS SERPL-MCNC: 4.1 NG/ML

## 2023-04-03 PROCEDURE — 93797 PHYS/QHP OP CAR RHAB WO ECG: CPT

## 2023-04-05 ENCOUNTER — APPOINTMENT (OUTPATIENT)
Dept: CARDIOLOGY | Facility: CLINIC | Age: 68
End: 2023-04-05
Payer: MEDICARE

## 2023-04-05 PROCEDURE — 93797 PHYS/QHP OP CAR RHAB WO ECG: CPT

## 2023-04-06 ENCOUNTER — APPOINTMENT (OUTPATIENT)
Dept: CARDIOLOGY | Facility: CLINIC | Age: 68
End: 2023-04-06
Payer: MEDICARE

## 2023-04-06 PROCEDURE — 93797 PHYS/QHP OP CAR RHAB WO ECG: CPT

## 2023-04-10 ENCOUNTER — APPOINTMENT (OUTPATIENT)
Dept: CARDIOLOGY | Facility: CLINIC | Age: 68
End: 2023-04-10
Payer: MEDICARE

## 2023-04-10 PROCEDURE — 93797 PHYS/QHP OP CAR RHAB WO ECG: CPT

## 2023-04-12 ENCOUNTER — APPOINTMENT (OUTPATIENT)
Dept: CARDIOLOGY | Facility: CLINIC | Age: 68
End: 2023-04-12
Payer: MEDICARE

## 2023-04-12 PROCEDURE — 93797 PHYS/QHP OP CAR RHAB WO ECG: CPT

## 2023-04-13 ENCOUNTER — APPOINTMENT (OUTPATIENT)
Dept: CARDIOLOGY | Facility: CLINIC | Age: 68
End: 2023-04-13
Payer: MEDICARE

## 2023-04-13 LAB
ALBUMIN SERPL ELPH-MCNC: 5 G/DL
ALP BLD-CCNC: 100 U/L
ALT SERPL-CCNC: 15 U/L
ANION GAP SERPL CALC-SCNC: 13 MMOL/L
AST SERPL-CCNC: 15 U/L
BILIRUB SERPL-MCNC: 0.6 MG/DL
BUN SERPL-MCNC: 21 MG/DL
CALCIUM SERPL-MCNC: 10.3 MG/DL
CHLORIDE SERPL-SCNC: 103 MMOL/L
CO2 SERPL-SCNC: 24 MMOL/L
CREAT SERPL-MCNC: 1.33 MG/DL
EGFR: 59 ML/MIN/1.73M2
GLUCOSE SERPL-MCNC: 108 MG/DL
POTASSIUM SERPL-SCNC: 4.6 MMOL/L
PROT SERPL-MCNC: 7.3 G/DL
SODIUM SERPL-SCNC: 140 MMOL/L
TACROLIMUS SERPL-MCNC: 6.1 NG/ML

## 2023-04-13 PROCEDURE — 93797 PHYS/QHP OP CAR RHAB WO ECG: CPT

## 2023-04-19 ENCOUNTER — NON-APPOINTMENT (OUTPATIENT)
Age: 68
End: 2023-04-19

## 2023-04-20 ENCOUNTER — APPOINTMENT (OUTPATIENT)
Dept: CARDIOLOGY | Facility: CLINIC | Age: 68
End: 2023-04-20

## 2023-04-24 ENCOUNTER — APPOINTMENT (OUTPATIENT)
Dept: CARDIOLOGY | Facility: CLINIC | Age: 68
End: 2023-04-24
Payer: MEDICARE

## 2023-04-24 ENCOUNTER — RX RENEWAL (OUTPATIENT)
Age: 68
End: 2023-04-24

## 2023-04-24 PROCEDURE — 93797 PHYS/QHP OP CAR RHAB WO ECG: CPT

## 2023-04-26 ENCOUNTER — APPOINTMENT (OUTPATIENT)
Dept: CARDIOLOGY | Facility: CLINIC | Age: 68
End: 2023-04-26
Payer: MEDICARE

## 2023-04-26 PROCEDURE — 93797 PHYS/QHP OP CAR RHAB WO ECG: CPT

## 2023-04-27 ENCOUNTER — APPOINTMENT (OUTPATIENT)
Dept: CARDIOLOGY | Facility: CLINIC | Age: 68
End: 2023-04-27
Payer: MEDICARE

## 2023-04-27 LAB
ALBUMIN SERPL ELPH-MCNC: 5 G/DL
ALP BLD-CCNC: 97 U/L
ALT SERPL-CCNC: 13 U/L
ANION GAP SERPL CALC-SCNC: 14 MMOL/L
AST SERPL-CCNC: 15 U/L
BASOPHILS # BLD AUTO: 0.06 K/UL
BASOPHILS NFR BLD AUTO: 0.8 %
BILIRUB SERPL-MCNC: 0.8 MG/DL
BUN SERPL-MCNC: 16 MG/DL
CALCIUM SERPL-MCNC: 10.5 MG/DL
CHLORIDE SERPL-SCNC: 103 MMOL/L
CO2 SERPL-SCNC: 23 MMOL/L
CREAT SERPL-MCNC: 1.35 MG/DL
EGFR: 58 ML/MIN/1.73M2
EOSINOPHIL # BLD AUTO: 0.19 K/UL
EOSINOPHIL NFR BLD AUTO: 2.4 %
GLUCOSE SERPL-MCNC: 109 MG/DL
HCT VFR BLD CALC: 49.7 %
HGB BLD-MCNC: 16.3 G/DL
IMM GRANULOCYTES NFR BLD AUTO: 0.3 %
LYMPHOCYTES # BLD AUTO: 2.59 K/UL
LYMPHOCYTES NFR BLD AUTO: 32.7 %
MAN DIFF?: NORMAL
MCHC RBC-ENTMCNC: 29.8 PG
MCHC RBC-ENTMCNC: 32.8 GM/DL
MCV RBC AUTO: 90.9 FL
MONOCYTES # BLD AUTO: 0.48 K/UL
MONOCYTES NFR BLD AUTO: 6.1 %
NEUTROPHILS # BLD AUTO: 4.57 K/UL
NEUTROPHILS NFR BLD AUTO: 57.7 %
PLATELET # BLD AUTO: 237 K/UL
POTASSIUM SERPL-SCNC: 4.8 MMOL/L
PROT SERPL-MCNC: 7.4 G/DL
RBC # BLD: 5.47 M/UL
RBC # FLD: 12.9 %
SODIUM SERPL-SCNC: 140 MMOL/L
TACROLIMUS SERPL-MCNC: 7.9 NG/ML
WBC # FLD AUTO: 7.91 K/UL

## 2023-04-27 PROCEDURE — 93797 PHYS/QHP OP CAR RHAB WO ECG: CPT

## 2023-05-01 ENCOUNTER — APPOINTMENT (OUTPATIENT)
Dept: CARDIOLOGY | Facility: CLINIC | Age: 68
End: 2023-05-01
Payer: MEDICARE

## 2023-05-01 PROCEDURE — 93797 PHYS/QHP OP CAR RHAB WO ECG: CPT

## 2023-05-03 ENCOUNTER — APPOINTMENT (OUTPATIENT)
Dept: CARDIOLOGY | Facility: CLINIC | Age: 68
End: 2023-05-03
Payer: MEDICARE

## 2023-05-03 PROCEDURE — 93797 PHYS/QHP OP CAR RHAB WO ECG: CPT

## 2023-05-04 ENCOUNTER — APPOINTMENT (OUTPATIENT)
Dept: CARDIOLOGY | Facility: CLINIC | Age: 68
End: 2023-05-04
Payer: MEDICARE

## 2023-05-04 VITALS
DIASTOLIC BLOOD PRESSURE: 76 MMHG | BODY MASS INDEX: 21.47 KG/M2 | HEIGHT: 70 IN | WEIGHT: 150 LBS | HEART RATE: 114 BPM | OXYGEN SATURATION: 99 % | SYSTOLIC BLOOD PRESSURE: 110 MMHG

## 2023-05-04 DIAGNOSIS — I77.1 STRICTURE OF ARTERY: ICD-10-CM

## 2023-05-04 PROCEDURE — 99214 OFFICE O/P EST MOD 30 MIN: CPT | Mod: 25

## 2023-05-04 PROCEDURE — 93797 PHYS/QHP OP CAR RHAB WO ECG: CPT

## 2023-05-04 NOTE — DISCUSSION/SUMMARY
[FreeTextEntry1] : 66M ICM/NICM now s/p OHT 6/21/22, CRT-D, CAD s/p PCI mLAD 4/22, T2DM, CKD3, VT who is here for follow up of DVT and L radial artery occlusion.\par  \par \par Plan\par 1.  Continue aspirin 81mg daily \par 2.  Will perform surveillance duplex of the bilateral venous and arterial upper extremtiies\par 3.  L radial should be avoided if future procedures are needed\par 4.  If all ok or stable, return in 6 months.

## 2023-05-04 NOTE — HISTORY OF PRESENT ILLNESS
[FreeTextEntry1] : 5/4/2023\par Doing well\par Arms feel fine\par no claudicaiton\par Hands feel fine\par No swelling of the uppers\par on exam he does have some collateral veins seen on L shoulder area.  Soft and compressble\par On left Allens suggests L radial artery occlusion, with patent palmar arch\par on R allens test normal \par \par \par Initial visit\par 66M ICM/NICM now s/p OHT 6/21/22, CRT-D, CAD s/p PCI mLAD 4/22, T2DM, CKD3, VT who is here for follow up of DVT and L radial artery stenosis.\par \par Patient had a partially occlusive thrombus of L subclavian vein and superficial RUE DVT on VA duplex 6/28\par - VA Duplex 7/6 show no DVT, partially occlusive thrombus visualized in the medial segment of the left subclavian vein without change along with superficial thrombophlebitis right cephalic and basilic veins at the right forearm\par \par L radial artery occlusion in July 2022\par \par Still on Eliquis 2.5 mg PO BID\par \par Patient not having any CP, SOB, arm pain.

## 2023-05-10 ENCOUNTER — APPOINTMENT (OUTPATIENT)
Dept: CARDIOLOGY | Facility: CLINIC | Age: 68
End: 2023-05-10
Payer: MEDICARE

## 2023-05-10 PROCEDURE — 93797 PHYS/QHP OP CAR RHAB WO ECG: CPT

## 2023-05-11 ENCOUNTER — APPOINTMENT (OUTPATIENT)
Dept: CARDIOLOGY | Facility: CLINIC | Age: 68
End: 2023-05-11
Payer: MEDICARE

## 2023-05-11 PROCEDURE — 93797 PHYS/QHP OP CAR RHAB WO ECG: CPT

## 2023-05-12 ENCOUNTER — APPOINTMENT (OUTPATIENT)
Dept: ENDOCRINOLOGY | Facility: CLINIC | Age: 68
End: 2023-05-12

## 2023-05-15 RX ORDER — BLOOD-GLUCOSE METER
KIT MISCELLANEOUS 4 TIMES DAILY
Qty: 3 | Refills: 3 | Status: ACTIVE | COMMUNITY
Start: 2022-06-29 | End: 1900-01-01

## 2023-05-15 RX ORDER — LANCETS 28 GAUGE
EACH MISCELLANEOUS
Qty: 3 | Refills: 3 | Status: ACTIVE | COMMUNITY
Start: 2022-06-29 | End: 1900-01-01

## 2023-05-19 ENCOUNTER — NON-APPOINTMENT (OUTPATIENT)
Age: 68
End: 2023-05-19

## 2023-05-22 ENCOUNTER — APPOINTMENT (OUTPATIENT)
Dept: CARDIOLOGY | Facility: CLINIC | Age: 68
End: 2023-05-22
Payer: MEDICARE

## 2023-05-22 PROCEDURE — 93797 PHYS/QHP OP CAR RHAB WO ECG: CPT

## 2023-05-24 ENCOUNTER — APPOINTMENT (OUTPATIENT)
Dept: CARDIOLOGY | Facility: CLINIC | Age: 68
End: 2023-05-24
Payer: MEDICARE

## 2023-05-24 PROCEDURE — 93797 PHYS/QHP OP CAR RHAB WO ECG: CPT

## 2023-05-25 ENCOUNTER — APPOINTMENT (OUTPATIENT)
Dept: CARDIOLOGY | Facility: CLINIC | Age: 68
End: 2023-05-25
Payer: MEDICARE

## 2023-05-25 ENCOUNTER — APPOINTMENT (OUTPATIENT)
Dept: ENDOCRINOLOGY | Facility: CLINIC | Age: 68
End: 2023-05-25
Payer: MEDICARE

## 2023-05-25 VITALS
BODY MASS INDEX: 21.9 KG/M2 | DIASTOLIC BLOOD PRESSURE: 70 MMHG | WEIGHT: 153 LBS | OXYGEN SATURATION: 96 % | HEART RATE: 78 BPM | HEIGHT: 70 IN | SYSTOLIC BLOOD PRESSURE: 100 MMHG

## 2023-05-25 PROCEDURE — 93797 PHYS/QHP OP CAR RHAB WO ECG: CPT

## 2023-05-25 PROCEDURE — 99214 OFFICE O/P EST MOD 30 MIN: CPT

## 2023-05-25 NOTE — PHYSICAL EXAM
[Alert] : alert [No Acute Distress] : no acute distress [Normal Hearing] : hearing was normal [No Neck Mass] : no neck mass was observed [No LAD] : no lymphadenopathy [Thyroid Not Enlarged] : the thyroid was not enlarged [No Respiratory Distress] : no respiratory distress [No Accessory Muscle Use] : no accessory muscle use [Clear to Auscultation] : lungs were clear to auscultation bilaterally [Normal PMI] : the apical impulse was normal [Normal S1, S2] : normal S1 and S2 [Normal Bowel Sounds] : normal bowel sounds [Not Tender] : non-tender [Soft] : abdomen soft [Oriented x3] : oriented to person, place, and time [Normal Affect] : the affect was normal [Normal Insight/Judgement] : insight and judgment were intact [Acanthosis Nigricans] : no acanthosis nigricans [de-identified] : tachycardic 112

## 2023-05-25 NOTE — ASSESSMENT
[FreeTextEntry1] : 67 M male with PMHx HTN, HLD, type 2 DM, chronic HFrEF, (EF 25-30% by Echo) who underwent OHT on 6/21/22, currently on tacrolimus 1mg q AM 1.5 mg q PM, presents for follow up s/p transplant for steroid/transplant induced diabetes mellitus. \par \par 1. Steroid/transplant induced diabetes mellitus.\par A1c 6.7% previously\par A1c 1/26/23: 5.5%\par No longer on Prednisone. \par Check A1c on labs.\par \par Plan:\par Reduce Metformin 500 mg once daily. \par Farxiga 5 mg daily-- he will monitor glucose, and call me if morning numbers are consistently >130.  We will increase Farxiga to 10 mg if running high. \par \par I offered and he declined CGM. \par  We expect transient fluctuations in GFR and creatinine. Ensure adequate hydration. \par Discussed case with Dr. Nuñez, they will monitor magnesium levels and decrease MgOx. \par Monitor FS pre-meal. \par \par Discussed with patient effects of tacrolimus on pancreatic beta cell function, and that we will have to assess his response to steroid taper and long-term use of tacrolimus, he verbalizes understanding. \par \par Optho referral given to patient with number, needs dilated eye exam. \par Discussed future options for potential GLP-1 agonist. We don't necessarily need weight reduction in this patient, although GLP would provide cardiac benefit. We decided this is not the best option for patient. \par \par - Discussed the risk of DKA and UTI.   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  Discussed that he/she should seek medical attention immediately if he/she experience any symptoms of tenderness, redness, or swelling of the genitals or the area from the genitals back to the rectum, and have a fever above 100.4 F or a general feeling of being unwell. These symptoms can worsen quickly, so it is important to seek treatment right away.\par \par HLD (hyperlipidemia).\par - LDL goal < 70\par -Rosuvastatin 5 mg daily -- on with cardiology

## 2023-05-25 NOTE — HISTORY OF PRESENT ILLNESS
[FreeTextEntry1] : 67M male with PMHx HTN, HLD, type 2 DM, chronic HFrEF, (EF 25-30% by Echo) who underwent OHT on 22. Endocrinology consulted for assistance with management of steroid induced hyperglycemia/dm2.\par \par Controlled type 2 diabetes mellitus with hyperglycemia.\par Diagnosis: Type 2 DM, diagnosed initially 2022. \par \par 23: A1c 5.5%\par We cannot do an A1c in the practice today because the A1c machine and supplies are not here we will order it on blood sample. \par \par Previous Medications\par Glimepiride 1 mg was not on steroids at that time. Diagnosed with diabetes 2022. \par \par Current Medications:\par Metformin 500 mg twice daily\par Farxiga 5 mg daily-- had some increased urination in beginning but now has resolved. \par \par Previous Medications:\par Levemir  6 units QHS -- 5pm \par \par Meter Readings:\par See scanned logs.\par AM [id number] [phone]\par Predinner blood glucose [phone]\par \par Current Steroid Regimen: Based on biopsy results\par -Prednisone tapered to 12.5 mg BID on  with BG at goal since dinner\par Reduced to 10 mg BID on \par Reduced to 7.5 mg BID on \par Prednisone 2 mg daily- lowered on 23. \par PRednisone taper completed, no longer on prednisone. \par \par Current Immunosuppressive Regimen:\par Currently on tacrolimus 1mg q AM 1.5 mg q PM\par \par Labs in hospital:\par egfr 60, LFT wnl, A1C 6.7 \par \par DIET:\par breakfast: toast/egg/fruit\par Lunch: rice/pasta with meat or sandwich/soup\par Dinner: low carb options as above, low sodium\par \par Exercise:\par Has therapist, walks outside, for 15 minutes or so at a time. \par \par  HTN \par - outpt BP goal < 130/80\par -on hydralazine and nifedipine\par - outpatient annual urinary microalb/cr ratio.-- states already checked. \par \par HLD (hyperlipidemia).\par - LDL goal < 70\par -Rosuvastatin 5 mg daily -- on with cardiology\par \par labs 22:\par GFR 85-- at time of biopsy, doing blood work weekly. \par \par optho: had dilated eye exam 3/2021-- normal, other than reading glasses prescription, some blocked tear ducts. \par

## 2023-05-30 ENCOUNTER — NON-APPOINTMENT (OUTPATIENT)
Age: 68
End: 2023-05-30

## 2023-05-30 ENCOUNTER — TRANSCRIPTION ENCOUNTER (OUTPATIENT)
Age: 68
End: 2023-05-30

## 2023-05-30 LAB
ALBUMIN SERPL ELPH-MCNC: 4.9 G/DL
ALP BLD-CCNC: 102 U/L
ALT SERPL-CCNC: 14 U/L
ANION GAP SERPL CALC-SCNC: 14 MMOL/L
AST SERPL-CCNC: 16 U/L
BILIRUB SERPL-MCNC: 1.2 MG/DL
BUN SERPL-MCNC: 23 MG/DL
CALCIUM SERPL-MCNC: 10.4 MG/DL
CHLORIDE SERPL-SCNC: 104 MMOL/L
CO2 SERPL-SCNC: 25 MMOL/L
CREAT SERPL-MCNC: 1.22 MG/DL
EGFR: 66 ML/MIN/1.73M2
ESTIMATED AVERAGE GLUCOSE: 126 MG/DL
GLUCOSE SERPL-MCNC: 106 MG/DL
HBA1C MFR BLD HPLC: 6 %
MAGNESIUM SERPL-MCNC: 2.2 MG/DL
POTASSIUM SERPL-SCNC: 4.7 MMOL/L
PROT SERPL-MCNC: 7.3 G/DL
SODIUM SERPL-SCNC: 142 MMOL/L
TACROLIMUS SERPL-MCNC: 5 NG/ML

## 2023-05-31 ENCOUNTER — APPOINTMENT (OUTPATIENT)
Dept: CARDIOLOGY | Facility: CLINIC | Age: 68
End: 2023-05-31
Payer: MEDICARE

## 2023-05-31 PROCEDURE — 93797 PHYS/QHP OP CAR RHAB WO ECG: CPT

## 2023-06-01 ENCOUNTER — APPOINTMENT (OUTPATIENT)
Dept: CARDIOLOGY | Facility: CLINIC | Age: 68
End: 2023-06-01
Payer: MEDICARE

## 2023-06-01 LAB
CMV DNA SPEC QL NAA+PROBE: NOT DETECTED IU/ML
CMVPCR LOG: NOT DETECTED LOG10IU/ML

## 2023-06-01 PROCEDURE — 93797 PHYS/QHP OP CAR RHAB WO ECG: CPT

## 2023-06-02 ENCOUNTER — NON-APPOINTMENT (OUTPATIENT)
Age: 68
End: 2023-06-02

## 2023-06-02 ENCOUNTER — TRANSCRIPTION ENCOUNTER (OUTPATIENT)
Age: 68
End: 2023-06-02

## 2023-06-05 ENCOUNTER — APPOINTMENT (OUTPATIENT)
Dept: CARDIOLOGY | Facility: CLINIC | Age: 68
End: 2023-06-05
Payer: MEDICARE

## 2023-06-05 PROCEDURE — 93797 PHYS/QHP OP CAR RHAB WO ECG: CPT

## 2023-06-07 ENCOUNTER — APPOINTMENT (OUTPATIENT)
Dept: CARDIOLOGY | Facility: CLINIC | Age: 68
End: 2023-06-07
Payer: MEDICARE

## 2023-06-07 PROCEDURE — 93797 PHYS/QHP OP CAR RHAB WO ECG: CPT

## 2023-06-08 ENCOUNTER — APPOINTMENT (OUTPATIENT)
Dept: CARDIOLOGY | Facility: CLINIC | Age: 68
End: 2023-06-08
Payer: MEDICARE

## 2023-06-08 PROCEDURE — 93797 PHYS/QHP OP CAR RHAB WO ECG: CPT

## 2023-06-12 ENCOUNTER — APPOINTMENT (OUTPATIENT)
Dept: CARDIOLOGY | Facility: CLINIC | Age: 68
End: 2023-06-12
Payer: MEDICARE

## 2023-06-12 PROCEDURE — 93797 PHYS/QHP OP CAR RHAB WO ECG: CPT

## 2023-06-13 ENCOUNTER — RX RENEWAL (OUTPATIENT)
Age: 68
End: 2023-06-13

## 2023-06-13 NOTE — PROGRESS NOTE ADULT - PROBLEM SELECTOR PLAN 1
She is some right upper quadrant pain.  She is at risk for cholecystitis.  We will get an ultrasound of the gallbladder.   ·  Plan: - s/p OHT with Dr. Earl/Maria C on 6/21 (ischemic time 261 min)  - IABP removed on POD 1  - d/c primacor today 6/29  - adjust tacro.   Lasix 40 mg PO BID 6/29  - remains on hydralazine 25 mg PO TID and Procardia increased to 60mg PO QD. ·  Plan: - s/p OHT with Dr. Earl/Maria C on 6/21 (ischemic time 261 min)  - IABP removed on POD 1  - d/c primacor today 6/29  - adjust tacro.   Lasix 40 mg PO BID 6/29  - continue hydralazine 25 mg PO TID and Procardia 60mg PO QD.

## 2023-06-14 ENCOUNTER — APPOINTMENT (OUTPATIENT)
Dept: CARDIOLOGY | Facility: CLINIC | Age: 68
End: 2023-06-14
Payer: MEDICARE

## 2023-06-14 PROCEDURE — 93797 PHYS/QHP OP CAR RHAB WO ECG: CPT

## 2023-06-15 ENCOUNTER — APPOINTMENT (OUTPATIENT)
Dept: CARDIOLOGY | Facility: CLINIC | Age: 68
End: 2023-06-15
Payer: MEDICARE

## 2023-06-15 PROCEDURE — 93797 PHYS/QHP OP CAR RHAB WO ECG: CPT

## 2023-06-18 ENCOUNTER — NON-APPOINTMENT (OUTPATIENT)
Age: 68
End: 2023-06-18

## 2023-06-19 ENCOUNTER — APPOINTMENT (OUTPATIENT)
Dept: CARDIOLOGY | Facility: CLINIC | Age: 68
End: 2023-06-19
Payer: MEDICARE

## 2023-06-19 PROCEDURE — 93797 PHYS/QHP OP CAR RHAB WO ECG: CPT

## 2023-06-21 ENCOUNTER — OUTPATIENT (OUTPATIENT)
Dept: OUTPATIENT SERVICES | Facility: HOSPITAL | Age: 68
LOS: 1 days | End: 2023-06-21
Payer: COMMERCIAL

## 2023-06-21 ENCOUNTER — APPOINTMENT (OUTPATIENT)
Dept: CV DIAGNOSITCS | Facility: HOSPITAL | Age: 68
End: 2023-06-21

## 2023-06-21 DIAGNOSIS — Z94.1 HEART TRANSPLANT STATUS: ICD-10-CM

## 2023-06-21 DIAGNOSIS — Z95.0 PRESENCE OF CARDIAC PACEMAKER: Chronic | ICD-10-CM

## 2023-06-21 DIAGNOSIS — Z98.890 OTHER SPECIFIED POSTPROCEDURAL STATES: Chronic | ICD-10-CM

## 2023-06-21 PROCEDURE — 93306 TTE W/DOPPLER COMPLETE: CPT

## 2023-06-21 PROCEDURE — 93356 MYOCRD STRAIN IMG SPCKL TRCK: CPT

## 2023-06-21 PROCEDURE — 93306 TTE W/DOPPLER COMPLETE: CPT | Mod: 26

## 2023-06-21 NOTE — ASU PREOP CHECKLIST - HEART RATE (BEATS/MIN)
Please let patient know that neurology recommends cutting the dose in half, but I know she said that she has already tried this. Neurology has attempted to contact her. Please instruct her to contact her neurologist for further recommendations.    97

## 2023-06-22 ENCOUNTER — APPOINTMENT (OUTPATIENT)
Dept: CARDIOLOGY | Facility: CLINIC | Age: 68
End: 2023-06-22
Payer: MEDICARE

## 2023-06-22 LAB
ALBUMIN SERPL ELPH-MCNC: 4.7 G/DL
ALP BLD-CCNC: 94 U/L
ALT SERPL-CCNC: 19 U/L
ANION GAP SERPL CALC-SCNC: 12 MMOL/L
AST SERPL-CCNC: 19 U/L
BILIRUB SERPL-MCNC: 0.9 MG/DL
BUN SERPL-MCNC: 24 MG/DL
CALCIUM SERPL-MCNC: 9.8 MG/DL
CHLORIDE SERPL-SCNC: 104 MMOL/L
CHOLEST SERPL-MCNC: 112 MG/DL
CO2 SERPL-SCNC: 24 MMOL/L
CREAT SERPL-MCNC: 1.25 MG/DL
EGFR: 64 ML/MIN/1.73M2
GLUCOSE SERPL-MCNC: 95 MG/DL
HDLC SERPL-MCNC: 32 MG/DL
LDLC SERPL CALC-MCNC: 43 MG/DL
MAGNESIUM SERPL-MCNC: 2 MG/DL
NONHDLC SERPL-MCNC: 80 MG/DL
POTASSIUM SERPL-SCNC: 5 MMOL/L
PROT SERPL-MCNC: 7.3 G/DL
SODIUM SERPL-SCNC: 140 MMOL/L
TACROLIMUS SERPL-MCNC: 14 NG/ML
TRIGL SERPL-MCNC: 182 MG/DL

## 2023-06-22 PROCEDURE — 93797 PHYS/QHP OP CAR RHAB WO ECG: CPT

## 2023-06-26 ENCOUNTER — APPOINTMENT (OUTPATIENT)
Dept: CARDIOLOGY | Facility: CLINIC | Age: 68
End: 2023-06-26
Payer: MEDICARE

## 2023-06-26 PROCEDURE — 93797 PHYS/QHP OP CAR RHAB WO ECG: CPT

## 2023-06-28 ENCOUNTER — OUTPATIENT (OUTPATIENT)
Dept: OUTPATIENT SERVICES | Facility: HOSPITAL | Age: 68
LOS: 1 days | End: 2023-06-28
Payer: MEDICARE

## 2023-06-28 ENCOUNTER — TRANSCRIPTION ENCOUNTER (OUTPATIENT)
Age: 68
End: 2023-06-28

## 2023-06-28 ENCOUNTER — APPOINTMENT (OUTPATIENT)
Dept: HEART FAILURE | Facility: CLINIC | Age: 68
End: 2023-06-28
Payer: MEDICARE

## 2023-06-28 ENCOUNTER — RESULT REVIEW (OUTPATIENT)
Age: 68
End: 2023-06-28

## 2023-06-28 VITALS
DIASTOLIC BLOOD PRESSURE: 90 MMHG | BODY MASS INDEX: 21.47 KG/M2 | HEIGHT: 70 IN | HEART RATE: 97 BPM | RESPIRATION RATE: 18 BRPM | WEIGHT: 150 LBS | OXYGEN SATURATION: 97 % | SYSTOLIC BLOOD PRESSURE: 119 MMHG | TEMPERATURE: 97.9 F

## 2023-06-28 VITALS
TEMPERATURE: 98 F | DIASTOLIC BLOOD PRESSURE: 90 MMHG | SYSTOLIC BLOOD PRESSURE: 9 MMHG | HEIGHT: 70 IN | RESPIRATION RATE: 20 BRPM | WEIGHT: 149.91 LBS | HEART RATE: 97 BPM | OXYGEN SATURATION: 97 %

## 2023-06-28 VITALS
SYSTOLIC BLOOD PRESSURE: 134 MMHG | HEART RATE: 93 BPM | OXYGEN SATURATION: 94 % | DIASTOLIC BLOOD PRESSURE: 84 MMHG | RESPIRATION RATE: 14 BRPM

## 2023-06-28 DIAGNOSIS — Z94.1 HEART TRANSPLANT STATUS: ICD-10-CM

## 2023-06-28 DIAGNOSIS — Z95.0 PRESENCE OF CARDIAC PACEMAKER: Chronic | ICD-10-CM

## 2023-06-28 DIAGNOSIS — Z98.890 OTHER SPECIFIED POSTPROCEDURAL STATES: Chronic | ICD-10-CM

## 2023-06-28 LAB
ALBUMIN SERPL ELPH-MCNC: 5 G/DL — SIGNIFICANT CHANGE UP (ref 3.3–5)
ALP SERPL-CCNC: 103 U/L — SIGNIFICANT CHANGE UP (ref 40–120)
ALT FLD-CCNC: 14 U/L — SIGNIFICANT CHANGE UP (ref 10–45)
ANION GAP SERPL CALC-SCNC: 15 MMOL/L — SIGNIFICANT CHANGE UP (ref 5–17)
AST SERPL-CCNC: 16 U/L — SIGNIFICANT CHANGE UP (ref 10–40)
BASOPHILS # BLD AUTO: 0.04 K/UL — SIGNIFICANT CHANGE UP (ref 0–0.2)
BASOPHILS NFR BLD AUTO: 0.4 % — SIGNIFICANT CHANGE UP (ref 0–2)
BILIRUB SERPL-MCNC: 1.1 MG/DL — SIGNIFICANT CHANGE UP (ref 0.2–1.2)
BUN SERPL-MCNC: 25 MG/DL — HIGH (ref 7–23)
CALCIUM SERPL-MCNC: 9.8 MG/DL — SIGNIFICANT CHANGE UP (ref 8.4–10.5)
CHLORIDE SERPL-SCNC: 100 MMOL/L — SIGNIFICANT CHANGE UP (ref 96–108)
CO2 SERPL-SCNC: 24 MMOL/L — SIGNIFICANT CHANGE UP (ref 22–31)
CREAT SERPL-MCNC: 1.47 MG/DL — HIGH (ref 0.5–1.3)
EGFR: 53 ML/MIN/1.73M2 — LOW
EOSINOPHIL # BLD AUTO: 0.08 K/UL — SIGNIFICANT CHANGE UP (ref 0–0.5)
EOSINOPHIL NFR BLD AUTO: 0.8 % — SIGNIFICANT CHANGE UP (ref 0–6)
GLUCOSE SERPL-MCNC: 107 MG/DL — HIGH (ref 70–99)
HCT VFR BLD CALC: 48 % — SIGNIFICANT CHANGE UP (ref 39–50)
HGB BLD-MCNC: 16.1 G/DL — SIGNIFICANT CHANGE UP (ref 13–17)
HGB FLD-MCNC: 14.4 G/DL — SIGNIFICANT CHANGE UP (ref 12.6–17.4)
HGB FLD-MCNC: 14.5 G/DL — SIGNIFICANT CHANGE UP (ref 12.6–17.4)
IMM GRANULOCYTES NFR BLD AUTO: 0.3 % — SIGNIFICANT CHANGE UP (ref 0–0.9)
LYMPHOCYTES # BLD AUTO: 2.37 K/UL — SIGNIFICANT CHANGE UP (ref 1–3.3)
LYMPHOCYTES # BLD AUTO: 24.5 % — SIGNIFICANT CHANGE UP (ref 13–44)
MAGNESIUM SERPL-MCNC: 2 MG/DL — SIGNIFICANT CHANGE UP (ref 1.6–2.6)
MCHC RBC-ENTMCNC: 29.9 PG — SIGNIFICANT CHANGE UP (ref 27–34)
MCHC RBC-ENTMCNC: 33.5 GM/DL — SIGNIFICANT CHANGE UP (ref 32–36)
MCV RBC AUTO: 89.2 FL — SIGNIFICANT CHANGE UP (ref 80–100)
MONOCYTES # BLD AUTO: 0.7 K/UL — SIGNIFICANT CHANGE UP (ref 0–0.9)
MONOCYTES NFR BLD AUTO: 7.2 % — SIGNIFICANT CHANGE UP (ref 2–14)
NEUTROPHILS # BLD AUTO: 6.46 K/UL — SIGNIFICANT CHANGE UP (ref 1.8–7.4)
NEUTROPHILS NFR BLD AUTO: 66.8 % — SIGNIFICANT CHANGE UP (ref 43–77)
NRBC # BLD: 0 /100 WBCS — SIGNIFICANT CHANGE UP (ref 0–0)
OXYHGB MFR BLDMV: 67.8 % — LOW (ref 90–95)
OXYHGB MFR BLDMV: 93.7 % — SIGNIFICANT CHANGE UP (ref 90–95)
PLATELET # BLD AUTO: 288 K/UL — SIGNIFICANT CHANGE UP (ref 150–400)
POTASSIUM SERPL-MCNC: 4.7 MMOL/L — SIGNIFICANT CHANGE UP (ref 3.5–5.3)
POTASSIUM SERPL-SCNC: 4.7 MMOL/L — SIGNIFICANT CHANGE UP (ref 3.5–5.3)
PROT SERPL-MCNC: 7.8 G/DL — SIGNIFICANT CHANGE UP (ref 6–8.3)
RBC # BLD: 5.38 M/UL — SIGNIFICANT CHANGE UP (ref 4.2–5.8)
RBC # FLD: 13.3 % — SIGNIFICANT CHANGE UP (ref 10.3–14.5)
SAO2 % BLD: 68.6 % — SIGNIFICANT CHANGE UP (ref 60–90)
SAO2 % BLD: 94.9 % — HIGH (ref 60–90)
SODIUM SERPL-SCNC: 139 MMOL/L — SIGNIFICANT CHANGE UP (ref 135–145)
TACROLIMUS SERPL-MCNC: 11.8 NG/ML — SIGNIFICANT CHANGE UP
WBC # BLD: 9.68 K/UL — SIGNIFICANT CHANGE UP (ref 3.8–10.5)
WBC # FLD AUTO: 9.68 K/UL — SIGNIFICANT CHANGE UP (ref 3.8–10.5)

## 2023-06-28 PROCEDURE — 36415 COLL VENOUS BLD VENIPUNCTURE: CPT

## 2023-06-28 PROCEDURE — 93010 ELECTROCARDIOGRAM REPORT: CPT

## 2023-06-28 PROCEDURE — 82803 BLOOD GASES ANY COMBINATION: CPT

## 2023-06-28 PROCEDURE — 93321 DOPPLER ECHO F-UP/LMTD STD: CPT

## 2023-06-28 PROCEDURE — 85025 COMPLETE CBC W/AUTO DIFF WBC: CPT

## 2023-06-28 PROCEDURE — 88307 TISSUE EXAM BY PATHOLOGIST: CPT

## 2023-06-28 PROCEDURE — C8922: CPT

## 2023-06-28 PROCEDURE — 88346 IMFLUOR 1ST 1ANTB STAIN PX: CPT

## 2023-06-28 PROCEDURE — C1887: CPT

## 2023-06-28 PROCEDURE — 88346 IMFLUOR 1ST 1ANTB STAIN PX: CPT | Mod: 26

## 2023-06-28 PROCEDURE — 80197 ASSAY OF TACROLIMUS: CPT

## 2023-06-28 PROCEDURE — 92978 ENDOLUMINL IVUS OCT C 1ST: CPT | Mod: LM

## 2023-06-28 PROCEDURE — C1894: CPT

## 2023-06-28 PROCEDURE — C1769: CPT

## 2023-06-28 PROCEDURE — 93321 DOPPLER ECHO F-UP/LMTD STD: CPT | Mod: 26

## 2023-06-28 PROCEDURE — 99215 OFFICE O/P EST HI 40 MIN: CPT

## 2023-06-28 PROCEDURE — 93454 CORONARY ARTERY ANGIO S&I: CPT | Mod: 59

## 2023-06-28 PROCEDURE — 93505 ENDOMYOCARDIAL BIOPSY: CPT

## 2023-06-28 PROCEDURE — 93505 ENDOMYOCARDIAL BIOPSY: CPT | Mod: 26

## 2023-06-28 PROCEDURE — 83735 ASSAY OF MAGNESIUM: CPT

## 2023-06-28 PROCEDURE — 88307 TISSUE EXAM BY PATHOLOGIST: CPT | Mod: 26

## 2023-06-28 PROCEDURE — 93308 TTE F-UP OR LMTD: CPT | Mod: 26

## 2023-06-28 PROCEDURE — 99152 MOD SED SAME PHYS/QHP 5/>YRS: CPT

## 2023-06-28 PROCEDURE — 93005 ELECTROCARDIOGRAM TRACING: CPT

## 2023-06-28 PROCEDURE — 93454 CORONARY ARTERY ANGIO S&I: CPT | Mod: 26,59

## 2023-06-28 PROCEDURE — C1753: CPT

## 2023-06-28 PROCEDURE — 80053 COMPREHEN METABOLIC PANEL: CPT

## 2023-06-28 PROCEDURE — 92978 ENDOLUMINL IVUS OCT C 1ST: CPT | Mod: 26,LM

## 2023-06-28 RX ORDER — INSULIN DETEMIR 100/ML (3)
6 INSULIN PEN (ML) SUBCUTANEOUS
Qty: 0 | Refills: 0 | DISCHARGE

## 2023-06-28 RX ORDER — ATOVAQUONE 750 MG/5ML
750 SUSPENSION ORAL DAILY
Qty: 3 | Refills: 3 | Status: DISCONTINUED | COMMUNITY
Start: 2022-06-29 | End: 2023-06-28

## 2023-06-28 RX ORDER — NIFEDIPINE 30 MG/1
30 TABLET, EXTENDED RELEASE ORAL DAILY
Qty: 30 | Refills: 5 | Status: DISCONTINUED | COMMUNITY
Start: 2023-01-04 | End: 2023-06-28

## 2023-06-28 RX ORDER — SODIUM CHLORIDE 9 MG/ML
3 INJECTION INTRAMUSCULAR; INTRAVENOUS; SUBCUTANEOUS EVERY 8 HOURS
Refills: 0 | Status: DISCONTINUED | OUTPATIENT
Start: 2023-06-28 | End: 2023-07-12

## 2023-06-28 RX ORDER — TACROLIMUS 5 MG/1
2 CAPSULE ORAL
Qty: 0 | Refills: 0 | DISCHARGE

## 2023-06-28 NOTE — ASU DISCHARGE PLAN (ADULT/PEDIATRIC) - CARE PROVIDER_API CALL
Vivian Nuñez  [facility]  [address] [place] [address]  Phone: [phone]  Fax: [phone]  Established Patient  Follow Up Time: Routine

## 2023-06-28 NOTE — H&P CARDIOLOGY - HISTORY OF PRESENT ILLNESS
66M ICM/NICM now s/p OHT 6/21/22, CRT-D, CAD s/p PCI mLAD 4/22, T2DM, CKD3, VT, Patient had a partially occlusive thrombus of L subclavian vein and superficial RUE DVT on VA duplex 6/28   VA Duplex 7/6 show no DVT, partially occlusive thrombus visualized in the medial segment of the left subclavian vein without change along with superficial thrombophlebitis right cephalic and basilic veins at the right forearm    All medications reviewed. Presents today for RHC with biopsy.   Pt initially presented to [facility] 5/20 with near syncope in setting of worsening HF symptoms and found to have 41 episodes of VT, many terminating with ATP. LHC did not reveal new obstructive CAD and he underwent EPS which did not reveal endocardial substrate amenable for ablation. RHC revealed severely depressed cardiac output and he was transferred to [facility] 5/26 for advanced therapies evaluation. IABP was placed and he was listed UNOS status 2e for OHT, A suitable donor was identified and patient underwent OHT with Dr. Earl/Maria C on 6/21/22.

## 2023-06-28 NOTE — ASU DISCHARGE PLAN (ADULT/PEDIATRIC) - NS MD DC FALL RISK RISK
For information on Fall & Injury Prevention, visit: [email or web address] OR  [email or web address] OR  https://www.cdc.gov/steadi/patient.html

## 2023-06-29 LAB
CMV DNA CSF QL NAA+PROBE: SIGNIFICANT CHANGE UP IU/ML
CMV DNA SPEC NAA+PROBE-LOG#: SIGNIFICANT CHANGE UP LOG10IU/ML

## 2023-06-30 LAB — SURGICAL PATHOLOGY STUDY: SIGNIFICANT CHANGE UP

## 2023-07-02 NOTE — CARDIOLOGY SUMMARY
[de-identified] : \par 6/28/23: ECG 97bpm, RBBB, when compared to EKG 3/15/23 no significant changes\par 03/15/23: ECG SR 98bpm, RBBB, when compared to EKG 10/20/22 no significant changes\par 10/20/22: ECG NSR 88bpm, RBBB, when compared to EKG 9/22/22 no significant changes\par 9/22/22 ECG: , RBBB,when compared to EKG 8/2/22 no significant changes\par 8/2/22 ECG: NSR 95bpm, RBBB, when compared to EKG 7/20/22 no significant changes\par 7/20/22 ECG: NSR at 100bpm RBBB\par 7/13/22 ECG: NSR at 94 bpm, RBBB. [de-identified] : \par 6/23: TTE: EF 55-60% LVIDd 4.3cm, GLS is 19.8%, borderline reduced RVSF, mild-mod TR, compared to TTE 6/14/23 there have been no signifiant interval changes\par 3/15/23 TTE EF 67% LVIDd 4.2cm, LVIDd 4.2cm, normal LVSF, normal RV function with decreased RVSF, mild TR, no pericardial effusion. Compared to TTE 1/4/23, RV size has improved, there is no change in RV function.\par 1/4/23 TTE EF 64% LVIDd 4.5cm, normal LVSF, Global strain -20%, decreased RV systolic function, no valvular abnormalities, no pericardial effusion. Copmared to TTE 11/30/22, no significant changes noted. \par 11/30/22 TTE EF 58% LVIDd 2.6, normal LFSV, decreased RV systolic function, RV wall strain -12.1%, no valvular abnormalities, no pericardial effusion\par 10/20/22 TTE limited: normal graft function. No pericardial effusion seen. \par TTE 8/2/22: limited study in the setting of an RV biopsy\par 1. Normal right ventricular size and function.\par 2. Small pericardiall effusion.\par A bioptome is noted in the right heart. No change to the size of the pericardial effusion or RV function\par 7/20/22: TTE: limited study: hyperdynamic LVSF, normal RV size and function, normal pericardium with trace to small pericardial effusion adjacent to RV\par 07/05/22 TTE: limited study: normal biV function, small loculated pericardial effusion at the LV apex, measuring approx 1.0 cm seen both pre and post images. [de-identified] : \par 6/28/22: RA 9, RV 3/11, PA 62/27/41, wedge 21 with v wave 31, PA sat 69.3%, /81/102, CO/Ci 6.87/3.62, grade 1R/2\par 7/5: RA 2, RV 35/3, PA 36/18/26, Wedge 15, PA sat 71.7, Kristy CO/CI 5.4/2.8, /75/93, SVR 1345 dsc, 1R/1A\par 7/13: RA 3, PAP 36/14/24 PCW 8, Pa sat 71%, Kristy CO/CI 6.1/3.2, EMBx ISHLT Grade 1R/1A\par 7/20: RA 3 PAP 47/20/30 PCW 15 PaSat 67% Kristy CO/CI 5.9/3.2 EMBx ISHLT Grade 1R/1A\par 8/2: RA 4, PAP 35/16/25 PCW 12 Pasat 68% Kristy CO/CI 5.3/2.9, EMBx ISHLT Grade 1R/1A\par 8/16: RA 5, PAP 42/19/27 PCW 15 Pasat 72% Kristy CO/CI 5.6/3.0, EMBx ISHLT Grade 0R\par 9/22: RA 5 PAP 41/18/28 PCW 16 PaSat 75% Kristy CO/CI 5.8/3.1, EMBx ISHLT Grade 1R/1A\par 10/20: RA 10/20/22 RA 2 RV 32/2 PA 33/9/21 PCWP 9 Kristy CO/CI 6.09/3.32 ACR 1R/1A, pAMR 0\par 6/28/23: RHC w/ EMBx, LHC w/ IVUS official report pending, per verbal report normal hemos and normal cors. EMBx = ISHLT Grade 2R/3A.

## 2023-07-02 NOTE — END OF VISIT
[FreeTextEntry3] : I, Dr. Nuñez, personally performed the evaluation and management (E/M) services for this established patient who presents today with (a) new problem(s)/exacerbation of (an) existing condition(s).  That E/M includes conducting the examination, assessing all new/exacerbated conditions, and establishing a new plan of care.  Today, my REBECA, Epicsell, was here to observe my evaluation and management services for this new problem/exacerbated condition to be followed going forward.  [Time Spent: ___ minutes] : I have spent [unfilled] minutes of time on the encounter.

## 2023-07-02 NOTE — DISCUSSION/SUMMARY
[Patient] : the patient [___ Month(s)] : in [unfilled] month(s) [FreeTextEntry2] : and his wife [FreeTextEntry1] : 66 YO M with DM Type 2 (A1C 6.2%), mixed ICM/NICM, and CKD stage 2-3, s/p OHT on 6/21/22 (ischemic time 261 mins, CMV +/+, Toxo -/-) who is overall doing clinically well, but has had fluctuating tacrolimus levels despite reporting a stable medication regimen. Had asymptomatic Grade 2R/3A rejection identified at 1st Annual despite normal [facility] testing early June.\par \par #ISHLT Grade 2R/3A cellular rejection 6/28 EMB\par - Normal hemodynamics and echocardiogram\par - In setting of lower dose CellCept (500 BID) and variable tacro levels.\par - Increase CellCept to 750 mg BID and follow CBC\par - Bolus prednisone 100 mg po daily x3 days then taper per protocol\par - Trough today 14. Levels have been quite variable so would continue tacrolimus at current dose 1.5 mg po BID for target trough 8-10 given current rejection. \par - Recheck RHC/EMB in 2 weeks\par \par #s/p OHT\par - continue ASA 81mg daily and rosuvastatin 5mg QHS for TCAD prophylaxis\par - 6/28/23 verbal report normal coronary arteries and normal IVUS\par - on 6/2/23 AlloMap = 33 and AlloSure < 0.09; prior allomaps 28-32, all allosure's <0.12\par - This patient has a heart allograft and is at risk for rejection through immune system recognition of non-self tissue. AlloMap and AlloSure and noninvasive tests ordered to evaluate for the probability of rejection after pretest and assist in immunosuppression management. Studies have shown that these tests can help to rule out rejection without subjecting the patient to the bleeding, arrhythmia, and structural damage risks of invasive endomyocardial biopsies. The AlloMap and AlloSure tests help guide clinical decision making for this patient's surveillance schedule and immunosuppression adjustments.\par - continue cardiac rehab\par \par #Pulmonary hypertension\par - continue sildenafil 10mg TID for pre-transplant PH\par - Reduce to sildenafil 10 mg BID given normal hemodynamics\par \par #Immunosuppression\par - adjusted as above for acute 2R cellular rejection\par - monitor CBC on CellCept 750 mg BID\par \par #prophylaxis\par - CMV +/+: completed 6 months of valcyte. CMV PCR last negative (2/23/23). Restart Valcyte 450 mg PO bid given need for higher dose immunosupression and steroid bolus for rejection. Monitor CBC and CMV PCR with routine labs as we did for the first 6 months of transplant.\par - Toxo -/-: no ppx needed\par - PJP: Continue Mepron 1500 mg daily in the setting of steroid boost.\par - thrush: Resume nystatin S&S qid while on steroid boost.\par \par #CKD stage 2, SCr 1.25, eGFR 66\par - stable and doing well\par - avoid nephrotoxic agents\par \par #HTN\par - generally controlled on current regimen, but DBP elevated today\par - continue nifedipine XL 30 mg daily\par - pt did not tolerate cardura due to hypotension\par - pt to maintain home BP log and notify our team if >140/90. Bring BP cuff to next visit to correlate with office BP machine.\par \par #DM Type 2 (HgA1c 6.0% 5/30/23)\par - followed by endocrine with good control\par - continue metformin 500 mg daily\par - continue Farxiga 5 mg daily\par - continue to monitor home finger sticks\par \par #Hyperlipidemia\par - Lipid panel 6/21/23: , , HDL 32, LDL 43. Encouraged heart healthy diet.\par - Continue rosuvastatin 5 mg po QHS\par \par #Health maintenance\par - continue mag oxide and MVI\par - continue calcium citrate + vitamin D\par - schedule DEXA\par - schedule annual derm appointment\par - f/u with PMD for routine health maintenance\par - last colonoscopy 2022, next due 2025\par \par Repeat labs in one week\par Repeat bx in two weeks\par Based on these findings will determine next clinic/bx/heartcare visit but would be q 3 months in 2nd year post transplant

## 2023-07-02 NOTE — PHYSICAL EXAM
[No Xanthelasma] : no xanthelasma [Normal Venous Pressure] : normal venous pressure [Normal Radial B/L] : normal radial B/L [Normal] : alert and oriented, normal memory [de-identified] : no perioral cyanosis, MMM [de-identified] : JVP < 6 cm H2O, no HJR

## 2023-07-02 NOTE — HISTORY OF PRESENT ILLNESS
[FreeTextEntry1] : 66 YO M with a history of ACC/AHA Stage D mixed NICM/ICM (likely familial with strong FH and early arrhythmia history in his 30's) with LVED 5.2 cm and LVEF 10-15% s/p PPM upgraded to CRT-D, CAD s/p PCI to mLAD 4/2022, well controlled DM2 (A1c 6.2%), and CKD III (Cr 1.4) and VT who is now s/p OHT on 6/21/22 (ischemic time 261 mins, CMV +/+, Toxo -/-).\par \par Pt  initially presented to [facility]20 with near syncope in setting of worsening HF symptoms and found to have 41 episodes of VT, many terminating with ATP. LHC did not reveal new obstructive CAD and he underwent EPS which did not reveal endocardial substrate amenable for ablation. RHC revealed severely depressed cardiac output and he was transferred to [facility] 5/26 for advanced therapies evaluation. IABP was placed and he was listed UNOS status 2e for OHT. He was briefly made status 7 as he became febrile without leukocytosis, then bld cx negative x48hrs he was re-activated UNOS status 2e. \par \par A suitable donor was identified and patient underwent OHT with Dr. Earl/Maria C on 6/21/22 (ischemic time 261 mins, CMV +/+, Toxo -/-). Patient was successfully extubated and IABP was removed on POD 1.  Cultures from the ICD site in the OR were positive for staph epidermidis/morganella morganii and he was treated with IV Vancomycin and Cefepime. CT scan demonstrated residual fibrous capsule in the region of the previous cardiac device in the left chest wall. Pt was then discharged home on oral antibiotics on 7/8/22.\par \janeth Pt is here today for f/u 1st annual clinic visit and angiogram one year post heart transplant. Since last seen, he reports feeling more fatigued in the last three weeks. Attending cardiac rehab 2x/week and walking on the other days. He reports loose stools on friday and then again this am.  He denies CP, SOB at rest, HSIEH, orthopnea, PND, LH/dizziness, abdominal discomfort, palpitations, and syncope. No tremor, fevers or chills. His appetite is normal. Sleeping well.  Home vital signs logs reviewed and average BP /71-94, -118mg/dL.

## 2023-07-05 ENCOUNTER — LABORATORY RESULT (OUTPATIENT)
Age: 68
End: 2023-07-05

## 2023-07-06 LAB
ALBUMIN SERPL ELPH-MCNC: 4.9 G/DL
ALP BLD-CCNC: 94 U/L
ALT SERPL-CCNC: 12 U/L
ANION GAP SERPL CALC-SCNC: 14 MMOL/L
AST SERPL-CCNC: 12 U/L
BILIRUB SERPL-MCNC: 0.9 MG/DL
BUN SERPL-MCNC: 32 MG/DL
CALCIUM SERPL-MCNC: 10.1 MG/DL
CHLORIDE SERPL-SCNC: 100 MMOL/L
CMV DNA SPEC QL NAA+PROBE: NOT DETECTED IU/ML
CMVPCR LOG: NOT DETECTED LOG10IU/ML
CO2 SERPL-SCNC: 24 MMOL/L
CREAT SERPL-MCNC: 1.3 MG/DL
EGFR: 61 ML/MIN/1.73M2
GLUCOSE SERPL-MCNC: 84 MG/DL
MAGNESIUM SERPL-MCNC: 1.9 MG/DL
POTASSIUM SERPL-SCNC: 4.5 MMOL/L
PROT SERPL-MCNC: 7 G/DL
SODIUM SERPL-SCNC: 139 MMOL/L
TACROLIMUS SERPL-MCNC: 6.3 NG/ML

## 2023-07-12 NOTE — PROGRESS NOTE ADULT - THIS PATIENT HAS THE FOLLOWING CONDITION(S)/DIAGNOSES ON THIS ADMISSION:
Discharge instructions reviewed with patient and visitor. Handouts given & verbalized understanding with no further questions at this time.  spoke to pt at bedside, reviewed procedure and findings, answered questions. Made aware they are awaiting biopsy results with MD telephone number provided per AVS sheet. VSS on RA, no pain or nausea noted, tolerating po fluids, no complaints noted. Fall precautions reviewed, consents in chart, PIV removed.    Shock

## 2023-07-13 ENCOUNTER — TRANSCRIPTION ENCOUNTER (OUTPATIENT)
Age: 68
End: 2023-07-13

## 2023-07-13 ENCOUNTER — APPOINTMENT (OUTPATIENT)
Dept: CV DIAGNOSITCS | Facility: HOSPITAL | Age: 68
End: 2023-07-13

## 2023-07-13 ENCOUNTER — RESULT REVIEW (OUTPATIENT)
Age: 68
End: 2023-07-13

## 2023-07-13 ENCOUNTER — OUTPATIENT (OUTPATIENT)
Dept: OUTPATIENT SERVICES | Facility: HOSPITAL | Age: 68
LOS: 1 days | End: 2023-07-13
Payer: MEDICARE

## 2023-07-13 VITALS
WEIGHT: 149.03 LBS | TEMPERATURE: 98 F | OXYGEN SATURATION: 100 % | DIASTOLIC BLOOD PRESSURE: 90 MMHG | HEIGHT: 70 IN | HEART RATE: 98 BPM | SYSTOLIC BLOOD PRESSURE: 132 MMHG | RESPIRATION RATE: 16 BRPM

## 2023-07-13 VITALS
HEIGHT: 70 IN | SYSTOLIC BLOOD PRESSURE: 132 MMHG | BODY MASS INDEX: 21.33 KG/M2 | OXYGEN SATURATION: 100 % | TEMPERATURE: 98 F | WEIGHT: 149 LBS | DIASTOLIC BLOOD PRESSURE: 90 MMHG | RESPIRATION RATE: 16 BRPM | HEART RATE: 98 BPM

## 2023-07-13 VITALS
HEART RATE: 99 BPM | DIASTOLIC BLOOD PRESSURE: 88 MMHG | SYSTOLIC BLOOD PRESSURE: 120 MMHG | OXYGEN SATURATION: 98 % | RESPIRATION RATE: 16 BRPM

## 2023-07-13 DIAGNOSIS — Z94.1 HEART TRANSPLANT STATUS: ICD-10-CM

## 2023-07-13 DIAGNOSIS — Z95.0 PRESENCE OF CARDIAC PACEMAKER: Chronic | ICD-10-CM

## 2023-07-13 DIAGNOSIS — Z98.890 OTHER SPECIFIED POSTPROCEDURAL STATES: Chronic | ICD-10-CM

## 2023-07-13 LAB
ALBUMIN SERPL ELPH-MCNC: 4.7 G/DL — SIGNIFICANT CHANGE UP (ref 3.3–5)
ALP SERPL-CCNC: 92 U/L — SIGNIFICANT CHANGE UP (ref 40–120)
ALT FLD-CCNC: 11 U/L — SIGNIFICANT CHANGE UP (ref 10–45)
ANION GAP SERPL CALC-SCNC: 13 MMOL/L — SIGNIFICANT CHANGE UP (ref 5–17)
AST SERPL-CCNC: 12 U/L — SIGNIFICANT CHANGE UP (ref 10–40)
BASOPHILS # BLD AUTO: 0.04 K/UL — SIGNIFICANT CHANGE UP (ref 0–0.2)
BASOPHILS NFR BLD AUTO: 0.3 % — SIGNIFICANT CHANGE UP (ref 0–2)
BILIRUB SERPL-MCNC: 1.6 MG/DL — HIGH (ref 0.2–1.2)
BUN SERPL-MCNC: 22 MG/DL — SIGNIFICANT CHANGE UP (ref 7–23)
CALCIUM SERPL-MCNC: 9.8 MG/DL — SIGNIFICANT CHANGE UP (ref 8.4–10.5)
CHLORIDE SERPL-SCNC: 102 MMOL/L — SIGNIFICANT CHANGE UP (ref 96–108)
CO2 SERPL-SCNC: 22 MMOL/L — SIGNIFICANT CHANGE UP (ref 22–31)
CREAT SERPL-MCNC: 1.27 MG/DL — SIGNIFICANT CHANGE UP (ref 0.5–1.3)
EGFR: 63 ML/MIN/1.73M2 — SIGNIFICANT CHANGE UP
EOSINOPHIL # BLD AUTO: 0.08 K/UL — SIGNIFICANT CHANGE UP (ref 0–0.5)
EOSINOPHIL NFR BLD AUTO: 0.6 % — SIGNIFICANT CHANGE UP (ref 0–6)
GLUCOSE SERPL-MCNC: 106 MG/DL — HIGH (ref 70–99)
HCT VFR BLD CALC: 51.6 % — HIGH (ref 39–50)
HGB BLD-MCNC: 17.2 G/DL — HIGH (ref 13–17)
IMM GRANULOCYTES NFR BLD AUTO: 0.4 % — SIGNIFICANT CHANGE UP (ref 0–0.9)
LYMPHOCYTES # BLD AUTO: 29.9 % — SIGNIFICANT CHANGE UP (ref 13–44)
LYMPHOCYTES # BLD AUTO: 3.88 K/UL — HIGH (ref 1–3.3)
MAGNESIUM SERPL-MCNC: 2 MG/DL — SIGNIFICANT CHANGE UP (ref 1.6–2.6)
MCHC RBC-ENTMCNC: 29.2 PG — SIGNIFICANT CHANGE UP (ref 27–34)
MCHC RBC-ENTMCNC: 33.3 GM/DL — SIGNIFICANT CHANGE UP (ref 32–36)
MCV RBC AUTO: 87.6 FL — SIGNIFICANT CHANGE UP (ref 80–100)
MONOCYTES # BLD AUTO: 0.37 K/UL — SIGNIFICANT CHANGE UP (ref 0–0.9)
MONOCYTES NFR BLD AUTO: 2.8 % — SIGNIFICANT CHANGE UP (ref 2–14)
NEUTROPHILS # BLD AUTO: 8.57 K/UL — HIGH (ref 1.8–7.4)
NEUTROPHILS NFR BLD AUTO: 66 % — SIGNIFICANT CHANGE UP (ref 43–77)
NRBC # BLD: 0 /100 WBCS — SIGNIFICANT CHANGE UP (ref 0–0)
PLATELET # BLD AUTO: 250 K/UL — SIGNIFICANT CHANGE UP (ref 150–400)
POTASSIUM SERPL-MCNC: 3.9 MMOL/L — SIGNIFICANT CHANGE UP (ref 3.5–5.3)
POTASSIUM SERPL-SCNC: 3.9 MMOL/L — SIGNIFICANT CHANGE UP (ref 3.5–5.3)
PROT SERPL-MCNC: 7.2 G/DL — SIGNIFICANT CHANGE UP (ref 6–8.3)
RBC # BLD: 5.89 M/UL — HIGH (ref 4.2–5.8)
RBC # FLD: 14 % — SIGNIFICANT CHANGE UP (ref 10.3–14.5)
SODIUM SERPL-SCNC: 137 MMOL/L — SIGNIFICANT CHANGE UP (ref 135–145)
TACROLIMUS SERPL-MCNC: 9.3 NG/ML — SIGNIFICANT CHANGE UP
WBC # BLD: 12.99 K/UL — HIGH (ref 3.8–10.5)
WBC # FLD AUTO: 12.99 K/UL — HIGH (ref 3.8–10.5)

## 2023-07-13 PROCEDURE — 88346 IMFLUOR 1ST 1ANTB STAIN PX: CPT | Mod: 26

## 2023-07-13 PROCEDURE — C1894: CPT

## 2023-07-13 PROCEDURE — 80053 COMPREHEN METABOLIC PANEL: CPT

## 2023-07-13 PROCEDURE — 93505 ENDOMYOCARDIAL BIOPSY: CPT | Mod: 26

## 2023-07-13 PROCEDURE — 93321 DOPPLER ECHO F-UP/LMTD STD: CPT

## 2023-07-13 PROCEDURE — 93308 TTE F-UP OR LMTD: CPT | Mod: 26

## 2023-07-13 PROCEDURE — 88346 IMFLUOR 1ST 1ANTB STAIN PX: CPT

## 2023-07-13 PROCEDURE — 93308 TTE F-UP OR LMTD: CPT

## 2023-07-13 PROCEDURE — 88307 TISSUE EXAM BY PATHOLOGIST: CPT | Mod: 26

## 2023-07-13 PROCEDURE — 93005 ELECTROCARDIOGRAM TRACING: CPT

## 2023-07-13 PROCEDURE — 93505 ENDOMYOCARDIAL BIOPSY: CPT

## 2023-07-13 PROCEDURE — 93010 ELECTROCARDIOGRAM REPORT: CPT

## 2023-07-13 PROCEDURE — 82803 BLOOD GASES ANY COMBINATION: CPT

## 2023-07-13 PROCEDURE — 83735 ASSAY OF MAGNESIUM: CPT

## 2023-07-13 PROCEDURE — 80197 ASSAY OF TACROLIMUS: CPT

## 2023-07-13 PROCEDURE — 88307 TISSUE EXAM BY PATHOLOGIST: CPT

## 2023-07-13 PROCEDURE — C1887: CPT

## 2023-07-13 PROCEDURE — 85025 COMPLETE CBC W/AUTO DIFF WBC: CPT

## 2023-07-13 PROCEDURE — 93321 DOPPLER ECHO F-UP/LMTD STD: CPT | Mod: 26

## 2023-07-13 PROCEDURE — 99152 MOD SED SAME PHYS/QHP 5/>YRS: CPT

## 2023-07-13 RX ORDER — DAPAGLIFLOZIN 10 MG/1
1 TABLET, FILM COATED ORAL
Refills: 0 | DISCHARGE

## 2023-07-13 RX ORDER — PREDNISONE 10 MG/1
10 TABLET ORAL
Qty: 100 | Refills: 1 | Status: DISCONTINUED | COMMUNITY
Start: 2023-06-29 | End: 2023-07-13

## 2023-07-13 RX ORDER — METFORMIN HYDROCHLORIDE 850 MG/1
2 TABLET ORAL
Qty: 0 | Refills: 0 | DISCHARGE

## 2023-07-13 NOTE — ASU DISCHARGE PLAN (ADULT/PEDIATRIC) - ASU DC SPECIAL INSTRUCTIONSFT
Wound Care:     The day AFTER your procedure remove bandage GENTLY, and clean using  mild soap and gentle warm, water stream, pat dry, leave OPEN to air.  YOU MAY SHOWER.   DO NOT apply lotions, creams, ointments, powder, parfumes to your incision site.  DO NOT SOAK your site for 1 week ( no baths, no pools, no tubs, etc...)  Check  your groin and /or wrist daily. A small amount of bruising, and soreness are normal    ACTIVITY: for 24 hours   - DO NOT DRIVE  - DO NOT make any important decisions or sign legal documents   - DO NOT operate heavy machineries   - you may resume sexual activity in 48 hours, unless otherwise instructed by your cardiologist     If your procedure was done through the WRIST: for the NEXT 3 DAYS:  - avoid pushing, pulling, with that affected wrist   - avoid repeated movement of that hand and wrist (eg: typing, hammering)  - DO NOT LIFT anything more than 5 lbs     If your procedure was done through the GROIN: for the NEXT 5 DAYS  - Limit climbing stairs, DO NOT soak in bathtub or pool  - no strenuous activities, pushing, pulling, straining  - Do not lift anything 10 Lbs or heavier     MEDICATION:   take your medications as explained (see discharge paperwork)   If you received a STENT, you will be taking antiplatelet medications to KEEP YOUR STENT OPEN (eg: Aspirin, Plavix, Brilinta, Effient, etc).  Take as prescribed DO NOT STOP taking them without consulting with your cardiologist first.     Follow heart healthy diet recommended by your doctor, if you smoke STOP SMOKING (may call [phone] for center of tobacco control if you need assistance)     CALL your doctor to make appointment in 2 WEEKS     ***CALL YOUR DOCTOR***  if you experience: fever, chills, body aches, or severe pain, swelling, redness, heat or yellow discharge at incision site  If you experience Bleeding or excruciating pain at the procedural site, swelling ( golf ball size) at your procedural site  If you experience CHEST PAIN  If you experience extremity numbness, tingling, temperature change ( of your procedural site)   If you are unable to reach your doctor, you may contact:   -Cardiology Office at [facility] at [phone] or   - [facility] [phone]  - [facility] [phone]

## 2023-07-13 NOTE — ASU PATIENT PROFILE, ADULT - FALL HARM RISK - UNIVERSAL INTERVENTIONS
Attempted call to patient with normal pathology results. [place]oma. Left a message asking for a return call back on tomorrow.    Bed in lowest position, wheels locked, appropriate side rails in place/Call bell, personal items and telephone in reach/Instruct patient to call for assistance before getting out of bed or chair/Non-slip footwear when patient is out of bed/[place] to call system/Physically safe environment - no spills, clutter or unnecessary equipment/Purposeful Proactive Rounding/Room/bathroom lighting operational, light cord in reach

## 2023-07-13 NOTE — ASU DISCHARGE PLAN (ADULT/PEDIATRIC) - CARE PROVIDER_API CALL
Vivian Nuñez  [facility]  [address] [place] [address]  Phone: [phone]  Fax: [phone]  Follow Up Time: Routine

## 2023-07-14 ENCOUNTER — NON-APPOINTMENT (OUTPATIENT)
Age: 68
End: 2023-07-14

## 2023-07-14 LAB
CMV DNA CSF QL NAA+PROBE: SIGNIFICANT CHANGE UP IU/ML
CMV DNA SPEC NAA+PROBE-LOG#: SIGNIFICANT CHANGE UP LOG10IU/ML
GI PCR PANEL: DETECTED
NOROVIRUS GI/GII: DETECTED
SURGICAL PATHOLOGY STUDY: SIGNIFICANT CHANGE UP

## 2023-07-18 ENCOUNTER — NON-APPOINTMENT (OUTPATIENT)
Age: 68
End: 2023-07-18

## 2023-07-19 LAB
ALBUMIN SERPL ELPH-MCNC: 5 G/DL
ALP BLD-CCNC: 102 U/L
ALT SERPL-CCNC: 12 U/L
ANION GAP SERPL CALC-SCNC: 15 MMOL/L
AST SERPL-CCNC: 15 U/L
BILIRUB SERPL-MCNC: 1.8 MG/DL
BUN SERPL-MCNC: 25 MG/DL
CALCIUM SERPL-MCNC: 10.3 MG/DL
CHLORIDE SERPL-SCNC: 100 MMOL/L
CO2 SERPL-SCNC: 22 MMOL/L
CREAT SERPL-MCNC: 1.2 MG/DL
EGFR: 67 ML/MIN/1.73M2
GLUCOSE SERPL-MCNC: 106 MG/DL
MAGNESIUM SERPL-MCNC: 1.8 MG/DL
POTASSIUM SERPL-SCNC: 4.7 MMOL/L
PROT SERPL-MCNC: 7.1 G/DL
SODIUM SERPL-SCNC: 136 MMOL/L
TACROLIMUS SERPL-MCNC: 9.6 NG/ML

## 2023-07-19 RX ORDER — VALGANCICLOVIR HYDROCHLORIDE 450 MG/1
450 TABLET ORAL
Qty: 60 | Refills: 5 | Status: DISCONTINUED | COMMUNITY
Start: 2023-06-29 | End: 2023-07-19

## 2023-07-21 ENCOUNTER — APPOINTMENT (OUTPATIENT)
Dept: INTERNAL MEDICINE | Facility: CLINIC | Age: 68
End: 2023-07-21
Payer: MEDICARE

## 2023-07-21 VITALS
DIASTOLIC BLOOD PRESSURE: 86 MMHG | HEIGHT: 70 IN | SYSTOLIC BLOOD PRESSURE: 110 MMHG | HEART RATE: 116 BPM | BODY MASS INDEX: 19.47 KG/M2 | WEIGHT: 136 LBS | OXYGEN SATURATION: 98 %

## 2023-07-21 PROCEDURE — 99212 OFFICE O/P EST SF 10 MIN: CPT

## 2023-07-21 NOTE — HISTORY OF PRESENT ILLNESS
[de-identified] : Gocool, Lennox is a 68yo M with PMhx of CAD/HFrEF s/p heart txp, DM, and CKD3 who presents to follow-up.\par \par 2 weeks ago developed diarrhea 2 times daily, early satiety. Diagnosed with norovirus by the Cardiology team. Slowly getting better. Feels weak at times. No nausea, bloody stools, or stomach pain. Still having diarrhea about 2 times daily.

## 2023-07-21 NOTE — PHYSICAL EXAM
[No Acute Distress] : no acute distress [Well Developed] : well developed [PERRL] : pupils equal round and reactive to light [EOMI] : extraocular movements intact [No Respiratory Distress] : no respiratory distress  [No Accessory Muscle Use] : no accessory muscle use [Clear to Auscultation] : lungs were clear to auscultation bilaterally [Regular Rhythm] : with a regular rhythm [Normal S1, S2] : normal S1 and S2 [No Murmur] : no murmur heard [No Edema] : there was no peripheral edema [Soft] : abdomen soft [Non Tender] : non-tender [Non-distended] : non-distended [No Masses] : no abdominal mass palpated [Grossly Normal Strength/Tone] : grossly normal strength/tone [No Rash] : no rash [Normal Gait] : normal gait [Normal Affect] : the affect was normal [Normal Insight/Judgement] : insight and judgment were intact [de-identified] : ?pinguecula noted of the bilateral eyes [de-identified] : Tachycardic [de-identified] : Answering questions appropriately. Gets on exam table and ambulates without assistance [TWNoteComboBox5] : +1 [TWNoteComboBox6] : +1

## 2023-07-26 LAB
HGB FLD-MCNC: 15.6 G/DL — SIGNIFICANT CHANGE UP (ref 12.6–17.4)
OXYHGB MFR BLDMV: 71.2 % — LOW (ref 90–95)
SAO2 % BLD: 72.3 % — SIGNIFICANT CHANGE UP (ref 60–90)

## 2023-07-31 ENCOUNTER — APPOINTMENT (OUTPATIENT)
Dept: CARDIOLOGY | Facility: CLINIC | Age: 68
End: 2023-07-31

## 2023-08-02 ENCOUNTER — APPOINTMENT (OUTPATIENT)
Dept: RADIOLOGY | Facility: IMAGING CENTER | Age: 68
End: 2023-08-02

## 2023-08-03 ENCOUNTER — APPOINTMENT (OUTPATIENT)
Dept: CARDIOLOGY | Facility: CLINIC | Age: 68
End: 2023-08-03

## 2023-08-04 LAB
ALBUMIN SERPL ELPH-MCNC: 4.7 G/DL
ALP BLD-CCNC: 86 U/L
ALT SERPL-CCNC: 12 U/L
ANION GAP SERPL CALC-SCNC: 13 MMOL/L
AST SERPL-CCNC: 12 U/L
BILIRUB SERPL-MCNC: 1.3 MG/DL
BUN SERPL-MCNC: 23 MG/DL
CALCIUM SERPL-MCNC: 10.1 MG/DL
CHLORIDE SERPL-SCNC: 103 MMOL/L
CMV DNA SPEC QL NAA+PROBE: NOT DETECTED IU/ML
CMVPCR LOG: NOT DETECTED LOG10IU/ML
CO2 SERPL-SCNC: 23 MMOL/L
CREAT SERPL-MCNC: 1.17 MG/DL
EGFR: 69 ML/MIN/1.73M2
GLUCOSE SERPL-MCNC: 90 MG/DL
MAGNESIUM SERPL-MCNC: 1.9 MG/DL
POTASSIUM SERPL-SCNC: 4.4 MMOL/L
PROT SERPL-MCNC: 7.2 G/DL
SODIUM SERPL-SCNC: 140 MMOL/L
TACROLIMUS SERPL-MCNC: 20.6 NG/ML

## 2023-08-07 ENCOUNTER — APPOINTMENT (OUTPATIENT)
Dept: CARDIOLOGY | Facility: CLINIC | Age: 68
End: 2023-08-07

## 2023-08-08 LAB
ALBUMIN SERPL ELPH-MCNC: 4.7 G/DL
ALP BLD-CCNC: 86 U/L
ALT SERPL-CCNC: 10 U/L
ANION GAP SERPL CALC-SCNC: 11 MMOL/L
AST SERPL-CCNC: 15 U/L
BILIRUB SERPL-MCNC: 0.8 MG/DL
BUN SERPL-MCNC: 15 MG/DL
CALCIUM SERPL-MCNC: 9.9 MG/DL
CHLORIDE SERPL-SCNC: 105 MMOL/L
CMV DNA SPEC QL NAA+PROBE: NOT DETECTED IU/ML
CMVPCR LOG: NOT DETECTED LOG10IU/ML
CO2 SERPL-SCNC: 23 MMOL/L
CREAT SERPL-MCNC: 1.2 MG/DL
EGFR: 67 ML/MIN/1.73M2
GLUCOSE SERPL-MCNC: 106 MG/DL
MAGNESIUM SERPL-MCNC: 1.8 MG/DL
POTASSIUM SERPL-SCNC: 4.3 MMOL/L
PROT SERPL-MCNC: 7 G/DL
SODIUM SERPL-SCNC: 139 MMOL/L
TACROLIMUS SERPL-MCNC: 7.7 NG/ML

## 2023-08-08 RX ORDER — NYSTATIN 100000 [USP'U]/ML
100000 SUSPENSION ORAL 4 TIMES DAILY
Qty: 2 | Refills: 5 | Status: DISCONTINUED | COMMUNITY
Start: 2023-06-29 | End: 2023-08-08

## 2023-08-11 LAB
ALBUMIN SERPL ELPH-MCNC: 4.6 G/DL
ALP BLD-CCNC: 83 U/L
ALT SERPL-CCNC: 13 U/L
ANION GAP SERPL CALC-SCNC: 12 MMOL/L
AST SERPL-CCNC: 14 U/L
BILIRUB SERPL-MCNC: 0.9 MG/DL
BUN SERPL-MCNC: 20 MG/DL
CALCIUM SERPL-MCNC: 9.8 MG/DL
CHLORIDE SERPL-SCNC: 104 MMOL/L
CO2 SERPL-SCNC: 23 MMOL/L
CREAT SERPL-MCNC: 1.22 MG/DL
EGFR: 66 ML/MIN/1.73M2
GLUCOSE SERPL-MCNC: 97 MG/DL
MAGNESIUM SERPL-MCNC: 1.9 MG/DL
POTASSIUM SERPL-SCNC: 4.3 MMOL/L
PROT SERPL-MCNC: 6.8 G/DL
SODIUM SERPL-SCNC: 139 MMOL/L
TACROLIMUS SERPL-MCNC: 13 NG/ML

## 2023-08-17 ENCOUNTER — APPOINTMENT (OUTPATIENT)
Dept: ENDOCRINOLOGY | Facility: CLINIC | Age: 68
End: 2023-08-17

## 2023-08-25 ENCOUNTER — APPOINTMENT (OUTPATIENT)
Dept: DERMATOLOGY | Facility: CLINIC | Age: 68
End: 2023-08-25

## 2023-09-06 NOTE — PROGRESS NOTE ADULT - CRITICAL CARE ATTENDING COMMENT
Presented to [facility] with VT, found to be in cardiogenic shock and transferred to [facility]  A+Ox3  Cardiogenic shock, increased Milrinone and increased pacing rate to 80, SvO2 70s   Holding Toprol, continue Amio PO for VT; quiescent on tele  S/p stent in 4/22, continue ASA but hold Plavix for potential procedures/surgeries  O2 sats mid 90s on room air  DASH/diabetic diet  Non-oliguric JAGRUTI that improves as Milrinone is increased, CVP 6-8 - target even   H/H acceptable on HSQ for DVT prophylaxis   Febrile, pan cultured and given Vancomycin - will remove Cordis/[place]  Sugars controlled  Gout flare, refractory to Colchicine - start Prednisone  RIJ Cordis (placed at [facility]) 5/26 and [place] 5/27 and leilani - remove Cordis/[place]  OOB Positioning (Leave Blank If You Do Not Want): The patient was placed in a comfortable position exposing the surgical site.

## 2023-09-07 LAB
ALBUMIN SERPL ELPH-MCNC: 4.7 G/DL
ALP BLD-CCNC: 93 U/L
ALT SERPL-CCNC: 18 U/L
ANION GAP SERPL CALC-SCNC: 11 MMOL/L
AST SERPL-CCNC: 14 U/L
BILIRUB SERPL-MCNC: 1.3 MG/DL
BUN SERPL-MCNC: 13 MG/DL
CALCIUM SERPL-MCNC: 10 MG/DL
CHLORIDE SERPL-SCNC: 104 MMOL/L
CO2 SERPL-SCNC: 24 MMOL/L
CREAT SERPL-MCNC: 1.28 MG/DL
EGFR: 62 ML/MIN/1.73M2
GLUCOSE SERPL-MCNC: 111 MG/DL
HCT VFR BLD CALC: 43.3 %
HGB BLD-MCNC: 14.6 G/DL
MAGNESIUM SERPL-MCNC: 1.5 MG/DL
MCHC RBC-ENTMCNC: 29.6 PG
MCHC RBC-ENTMCNC: 33.7 GM/DL
MCV RBC AUTO: 87.7 FL
PLATELET # BLD AUTO: 227 K/UL
POTASSIUM SERPL-SCNC: 4.4 MMOL/L
PROT SERPL-MCNC: 6.8 G/DL
RBC # BLD: 4.94 M/UL
RBC # FLD: 14.1 %
SODIUM SERPL-SCNC: 139 MMOL/L
TACROLIMUS SERPL-MCNC: 10.1 NG/ML
WBC # FLD AUTO: 7.66 K/UL

## 2023-09-11 LAB
C DIFF TOXIN B QL PCR REFLEX: NORMAL
CMV DNA SPEC QL NAA+PROBE: ABNORMAL IU/ML
CMVPCR LOG: ABNORMAL LOG10IU/ML
DEPRECATED O AND P PREP STL: NORMAL
GDH ANTIGEN: NOT DETECTED
TOXIN A AND B: NOT DETECTED

## 2023-09-13 ENCOUNTER — APPOINTMENT (OUTPATIENT)
Dept: CV DIAGNOSITCS | Facility: HOSPITAL | Age: 68
End: 2023-09-13

## 2023-09-13 ENCOUNTER — APPOINTMENT (OUTPATIENT)
Dept: HEART FAILURE | Facility: CLINIC | Age: 68
End: 2023-09-13

## 2023-09-13 ENCOUNTER — NON-APPOINTMENT (OUTPATIENT)
Age: 68
End: 2023-09-13

## 2023-09-13 ENCOUNTER — INPATIENT (INPATIENT)
Facility: HOSPITAL | Age: 68
LOS: 2 days | Discharge: HOME | DRG: 865 | End: 2023-09-16
Attending: INTERNAL MEDICINE | Admitting: HOSPITALIST
Payer: MEDICARE

## 2023-09-13 VITALS
DIASTOLIC BLOOD PRESSURE: 65 MMHG | HEIGHT: 70 IN | HEART RATE: 118 BPM | OXYGEN SATURATION: 97 % | RESPIRATION RATE: 20 BRPM | SYSTOLIC BLOOD PRESSURE: 107 MMHG | TEMPERATURE: 100 F | WEIGHT: 136.03 LBS

## 2023-09-13 DIAGNOSIS — Z29.9 ENCOUNTER FOR PROPHYLACTIC MEASURES, UNSPECIFIED: ICD-10-CM

## 2023-09-13 DIAGNOSIS — N18.9 CHRONIC KIDNEY DISEASE, UNSPECIFIED: ICD-10-CM

## 2023-09-13 DIAGNOSIS — D84.9 IMMUNODEFICIENCY, UNSPECIFIED: ICD-10-CM

## 2023-09-13 DIAGNOSIS — R73.9 HYPERGLYCEMIA, UNSPECIFIED: ICD-10-CM

## 2023-09-13 DIAGNOSIS — Z98.890 OTHER SPECIFIED POSTPROCEDURAL STATES: Chronic | ICD-10-CM

## 2023-09-13 DIAGNOSIS — I10 ESSENTIAL (PRIMARY) HYPERTENSION: ICD-10-CM

## 2023-09-13 DIAGNOSIS — J06.9 ACUTE UPPER RESPIRATORY INFECTION, UNSPECIFIED: ICD-10-CM

## 2023-09-13 DIAGNOSIS — Z95.0 PRESENCE OF CARDIAC PACEMAKER: Chronic | ICD-10-CM

## 2023-09-13 DIAGNOSIS — Z94.1 HEART TRANSPLANT STATUS: ICD-10-CM

## 2023-09-13 DIAGNOSIS — B34.9 VIRAL INFECTION, UNSPECIFIED: ICD-10-CM

## 2023-09-13 DIAGNOSIS — R50.9 FEVER, UNSPECIFIED: ICD-10-CM

## 2023-09-13 DIAGNOSIS — R65.10 SYSTEMIC INFLAMMATORY RESPONSE SYNDROME (SIRS) OF NON-INFECTIOUS ORIGIN WITHOUT ACUTE ORGAN DYSFUNCTION: ICD-10-CM

## 2023-09-13 LAB
ALBUMIN SERPL ELPH-MCNC: 4.2 G/DL — SIGNIFICANT CHANGE UP (ref 3.3–5)
ALP SERPL-CCNC: 83 U/L — SIGNIFICANT CHANGE UP (ref 40–120)
ALT FLD-CCNC: 10 U/L — SIGNIFICANT CHANGE UP (ref 10–45)
ANION GAP SERPL CALC-SCNC: 13 MMOL/L — SIGNIFICANT CHANGE UP (ref 5–17)
APPEARANCE UR: CLEAR — SIGNIFICANT CHANGE UP
APTT BLD: 27.5 SEC — SIGNIFICANT CHANGE UP (ref 24.5–35.6)
AST SERPL-CCNC: 11 U/L — SIGNIFICANT CHANGE UP (ref 10–40)
BASE EXCESS BLDV CALC-SCNC: -0.1 MMOL/L — SIGNIFICANT CHANGE UP (ref -2–3)
BASOPHILS # BLD AUTO: 0.05 K/UL — SIGNIFICANT CHANGE UP (ref 0–0.2)
BASOPHILS NFR BLD AUTO: 0.4 % — SIGNIFICANT CHANGE UP (ref 0–2)
BILIRUB SERPL-MCNC: 0.9 MG/DL — SIGNIFICANT CHANGE UP (ref 0.2–1.2)
BILIRUB UR-MCNC: NEGATIVE — SIGNIFICANT CHANGE UP
BUN SERPL-MCNC: 12 MG/DL — SIGNIFICANT CHANGE UP (ref 7–23)
CA-I SERPL-SCNC: 1.25 MMOL/L — SIGNIFICANT CHANGE UP (ref 1.15–1.33)
CALCIUM SERPL-MCNC: 9.7 MG/DL — SIGNIFICANT CHANGE UP (ref 8.4–10.5)
CHLORIDE BLDV-SCNC: 98 MMOL/L — SIGNIFICANT CHANGE UP (ref 96–108)
CHLORIDE SERPL-SCNC: 100 MMOL/L — SIGNIFICANT CHANGE UP (ref 96–108)
CO2 BLDV-SCNC: 28 MMOL/L — HIGH (ref 22–26)
CO2 SERPL-SCNC: 22 MMOL/L — SIGNIFICANT CHANGE UP (ref 22–31)
COLOR SPEC: COLORLESS — SIGNIFICANT CHANGE UP
CREAT SERPL-MCNC: 1.18 MG/DL — SIGNIFICANT CHANGE UP (ref 0.5–1.3)
DIFF PNL FLD: NEGATIVE — SIGNIFICANT CHANGE UP
EGFR: 68 ML/MIN/1.73M2 — SIGNIFICANT CHANGE UP
EOSINOPHIL # BLD AUTO: 0.09 K/UL — SIGNIFICANT CHANGE UP (ref 0–0.5)
EOSINOPHIL NFR BLD AUTO: 0.7 % — SIGNIFICANT CHANGE UP (ref 0–6)
GAS PNL BLDV: 132 MMOL/L — LOW (ref 136–145)
GAS PNL BLDV: SIGNIFICANT CHANGE UP
GAS PNL BLDV: SIGNIFICANT CHANGE UP
GLUCOSE BLDC GLUCOMTR-MCNC: 96 MG/DL — SIGNIFICANT CHANGE UP (ref 70–99)
GLUCOSE BLDV-MCNC: 119 MG/DL — HIGH (ref 70–99)
GLUCOSE SERPL-MCNC: 119 MG/DL — HIGH (ref 70–99)
GLUCOSE UR QL: NEGATIVE — SIGNIFICANT CHANGE UP
HCO3 BLDV-SCNC: 26 MMOL/L — SIGNIFICANT CHANGE UP (ref 22–29)
HCOV PNL SPEC NAA+PROBE: DETECTED
HCT VFR BLD CALC: 43.4 % — SIGNIFICANT CHANGE UP (ref 39–50)
HCT VFR BLDA CALC: 44 % — SIGNIFICANT CHANGE UP (ref 39–51)
HGB BLD CALC-MCNC: 14.5 G/DL — SIGNIFICANT CHANGE UP (ref 12.6–17.4)
HGB BLD-MCNC: 14.5 G/DL — SIGNIFICANT CHANGE UP (ref 13–17)
IMM GRANULOCYTES NFR BLD AUTO: 0.5 % — SIGNIFICANT CHANGE UP (ref 0–0.9)
INR BLD: 1.16 RATIO — SIGNIFICANT CHANGE UP (ref 0.85–1.18)
KETONES UR-MCNC: NEGATIVE — SIGNIFICANT CHANGE UP
LACTATE BLDV-MCNC: 1.4 MMOL/L — SIGNIFICANT CHANGE UP (ref 0.5–2)
LEUKOCYTE ESTERASE UR-ACNC: NEGATIVE — SIGNIFICANT CHANGE UP
LYMPHOCYTES # BLD AUTO: 1.13 K/UL — SIGNIFICANT CHANGE UP (ref 1–3.3)
LYMPHOCYTES # BLD AUTO: 9.3 % — LOW (ref 13–44)
MCHC RBC-ENTMCNC: 29.4 PG — SIGNIFICANT CHANGE UP (ref 27–34)
MCHC RBC-ENTMCNC: 33.4 GM/DL — SIGNIFICANT CHANGE UP (ref 32–36)
MCV RBC AUTO: 88 FL — SIGNIFICANT CHANGE UP (ref 80–100)
MONOCYTES # BLD AUTO: 0.95 K/UL — HIGH (ref 0–0.9)
MONOCYTES NFR BLD AUTO: 7.8 % — SIGNIFICANT CHANGE UP (ref 2–14)
NEUTROPHILS # BLD AUTO: 9.86 K/UL — HIGH (ref 1.8–7.4)
NEUTROPHILS NFR BLD AUTO: 81.3 % — HIGH (ref 43–77)
NITRITE UR-MCNC: NEGATIVE — SIGNIFICANT CHANGE UP
NRBC # BLD: 0 /100 WBCS — SIGNIFICANT CHANGE UP (ref 0–0)
PCO2 BLDV: 49 MMHG — SIGNIFICANT CHANGE UP (ref 42–55)
PH BLDV: 7.34 — SIGNIFICANT CHANGE UP (ref 7.32–7.43)
PH UR: 6 — SIGNIFICANT CHANGE UP (ref 5–8)
PLATELET # BLD AUTO: 223 K/UL — SIGNIFICANT CHANGE UP (ref 150–400)
PO2 BLDV: 22 MMHG — LOW (ref 25–45)
POTASSIUM BLDV-SCNC: 4.5 MMOL/L — SIGNIFICANT CHANGE UP (ref 3.5–5.1)
POTASSIUM SERPL-MCNC: 4.4 MMOL/L — SIGNIFICANT CHANGE UP (ref 3.5–5.3)
POTASSIUM SERPL-SCNC: 4.4 MMOL/L — SIGNIFICANT CHANGE UP (ref 3.5–5.3)
PROT SERPL-MCNC: 7.2 G/DL — SIGNIFICANT CHANGE UP (ref 6–8.3)
PROT UR-MCNC: NEGATIVE — SIGNIFICANT CHANGE UP
PROTHROM AB SERPL-ACNC: 12.7 SEC — SIGNIFICANT CHANGE UP (ref 9.5–13)
RAPID RVP RESULT: DETECTED
RBC # BLD: 4.93 M/UL — SIGNIFICANT CHANGE UP (ref 4.2–5.8)
RBC # FLD: 14 % — SIGNIFICANT CHANGE UP (ref 10.3–14.5)
SAO2 % BLDV: 29.8 % — LOW (ref 67–88)
SARS-COV-2 RNA SPEC QL NAA+PROBE: SIGNIFICANT CHANGE UP
SODIUM SERPL-SCNC: 135 MMOL/L — SIGNIFICANT CHANGE UP (ref 135–145)
SP GR SPEC: 1 — LOW (ref 1.01–1.02)
UROBILINOGEN FLD QL: NEGATIVE — SIGNIFICANT CHANGE UP
WBC # BLD: 12.14 K/UL — HIGH (ref 3.8–10.5)
WBC # FLD AUTO: 12.14 K/UL — HIGH (ref 3.8–10.5)

## 2023-09-13 PROCEDURE — 99285 EMERGENCY DEPT VISIT HI MDM: CPT

## 2023-09-13 PROCEDURE — 99223 1ST HOSP IP/OBS HIGH 75: CPT

## 2023-09-13 PROCEDURE — 71045 X-RAY EXAM CHEST 1 VIEW: CPT | Mod: 26

## 2023-09-13 PROCEDURE — 99223 1ST HOSP IP/OBS HIGH 75: CPT | Mod: GC

## 2023-09-13 RX ORDER — ENOXAPARIN SODIUM 100 MG/ML
40 INJECTION SUBCUTANEOUS EVERY 24 HOURS
Refills: 0 | Status: DISCONTINUED | OUTPATIENT
Start: 2023-09-13 | End: 2023-09-16

## 2023-09-13 RX ORDER — NYSTATIN 500MM UNIT
5 POWDER (EA) MISCELLANEOUS
Refills: 0 | DISCHARGE

## 2023-09-13 RX ORDER — GLUCAGON INJECTION, SOLUTION 0.5 MG/.1ML
1 INJECTION, SOLUTION SUBCUTANEOUS ONCE
Refills: 0 | Status: DISCONTINUED | OUTPATIENT
Start: 2023-09-13 | End: 2023-09-16

## 2023-09-13 RX ORDER — NYSTATIN 500MM UNIT
500000 POWDER (EA) MISCELLANEOUS
Refills: 0 | Status: DISCONTINUED | OUTPATIENT
Start: 2023-09-13 | End: 2023-09-15

## 2023-09-13 RX ORDER — CEFEPIME 1 G/1
2000 INJECTION, POWDER, FOR SOLUTION INTRAMUSCULAR; INTRAVENOUS EVERY 12 HOURS
Refills: 0 | Status: DISCONTINUED | OUTPATIENT
Start: 2023-09-14 | End: 2023-09-15

## 2023-09-13 RX ORDER — ATOVAQUONE 750 MG/5ML
1500 SUSPENSION ORAL
Refills: 0 | Status: DISCONTINUED | OUTPATIENT
Start: 2023-09-13 | End: 2023-09-16

## 2023-09-13 RX ORDER — MAGNESIUM OXIDE 400 MG ORAL TABLET 241.3 MG
400 TABLET ORAL DAILY
Refills: 0 | Status: DISCONTINUED | OUTPATIENT
Start: 2023-09-13 | End: 2023-09-16

## 2023-09-13 RX ORDER — DEXTROSE 50 % IN WATER 50 %
25 SYRINGE (ML) INTRAVENOUS ONCE
Refills: 0 | Status: DISCONTINUED | OUTPATIENT
Start: 2023-09-13 | End: 2023-09-16

## 2023-09-13 RX ORDER — MYCOPHENOLATE MOFETIL 250 MG/1
500 CAPSULE ORAL
Refills: 0 | Status: DISCONTINUED | OUTPATIENT
Start: 2023-09-13 | End: 2023-09-15

## 2023-09-13 RX ORDER — SODIUM CHLORIDE 9 MG/ML
1000 INJECTION, SOLUTION INTRAVENOUS ONCE
Refills: 0 | Status: COMPLETED | OUTPATIENT
Start: 2023-09-13 | End: 2023-09-13

## 2023-09-13 RX ORDER — SODIUM CHLORIDE 9 MG/ML
1000 INJECTION, SOLUTION INTRAVENOUS
Refills: 0 | Status: DISCONTINUED | OUTPATIENT
Start: 2023-09-13 | End: 2023-09-16

## 2023-09-13 RX ORDER — MYCOPHENOLATE MOFETIL 250 MG/1
4 CAPSULE ORAL
Refills: 0 | DISCHARGE

## 2023-09-13 RX ORDER — PREDNISONE 5 MG/1
5 TABLET ORAL
Qty: 45 | Refills: 5 | Status: DISCONTINUED | COMMUNITY
Start: 2023-06-29 | End: 2023-09-13

## 2023-09-13 RX ORDER — ASPIRIN/CALCIUM CARB/MAGNESIUM 324 MG
81 TABLET ORAL DAILY
Refills: 0 | Status: DISCONTINUED | OUTPATIENT
Start: 2023-09-13 | End: 2023-09-16

## 2023-09-13 RX ORDER — TACROLIMUS 5 MG/1
1 CAPSULE ORAL
Refills: 0 | DISCHARGE

## 2023-09-13 RX ORDER — FUROSEMIDE 40 MG
40 TABLET ORAL ONCE
Refills: 0 | Status: DISCONTINUED | OUTPATIENT
Start: 2023-09-13 | End: 2023-09-13

## 2023-09-13 RX ORDER — DAPAGLIFLOZIN 10 MG/1
1 TABLET, FILM COATED ORAL
Refills: 0 | DISCHARGE

## 2023-09-13 RX ORDER — ACETAMINOPHEN 500 MG
975 TABLET ORAL ONCE
Refills: 0 | Status: COMPLETED | OUTPATIENT
Start: 2023-09-13 | End: 2023-09-13

## 2023-09-13 RX ORDER — CEFEPIME 1 G/1
INJECTION, POWDER, FOR SOLUTION INTRAMUSCULAR; INTRAVENOUS
Refills: 0 | Status: DISCONTINUED | OUTPATIENT
Start: 2023-09-13 | End: 2023-09-15

## 2023-09-13 RX ORDER — ATORVASTATIN CALCIUM 80 MG/1
20 TABLET, FILM COATED ORAL AT BEDTIME
Refills: 0 | Status: DISCONTINUED | OUTPATIENT
Start: 2023-09-13 | End: 2023-09-16

## 2023-09-13 RX ORDER — NIFEDIPINE 30 MG
1 TABLET, EXTENDED RELEASE 24 HR ORAL
Refills: 0 | DISCHARGE

## 2023-09-13 RX ORDER — MAGNESIUM OXIDE 400 MG ORAL TABLET 241.3 MG
1 TABLET ORAL
Refills: 0 | DISCHARGE

## 2023-09-13 RX ORDER — NIFEDIPINE 30 MG
30 TABLET, EXTENDED RELEASE 24 HR ORAL DAILY
Refills: 0 | Status: DISCONTINUED | OUTPATIENT
Start: 2023-09-13 | End: 2023-09-13

## 2023-09-13 RX ORDER — INSULIN LISPRO 100/ML
VIAL (ML) SUBCUTANEOUS
Refills: 0 | Status: DISCONTINUED | OUTPATIENT
Start: 2023-09-13 | End: 2023-09-16

## 2023-09-13 RX ORDER — TACROLIMUS 5 MG/1
2 CAPSULE ORAL DAILY
Refills: 0 | Status: DISCONTINUED | OUTPATIENT
Start: 2023-09-13 | End: 2023-09-14

## 2023-09-13 RX ORDER — DEXTROSE 50 % IN WATER 50 %
15 SYRINGE (ML) INTRAVENOUS ONCE
Refills: 0 | Status: DISCONTINUED | OUTPATIENT
Start: 2023-09-13 | End: 2023-09-16

## 2023-09-13 RX ORDER — DEXTROSE 50 % IN WATER 50 %
12.5 SYRINGE (ML) INTRAVENOUS ONCE
Refills: 0 | Status: DISCONTINUED | OUTPATIENT
Start: 2023-09-13 | End: 2023-09-16

## 2023-09-13 RX ORDER — ROSUVASTATIN CALCIUM 5 MG/1
1 TABLET ORAL
Qty: 0 | Refills: 0 | DISCHARGE

## 2023-09-13 RX ORDER — METFORMIN HYDROCHLORIDE 850 MG/1
1 TABLET ORAL
Refills: 0 | DISCHARGE

## 2023-09-13 RX ORDER — TACROLIMUS 5 MG/1
1.5 CAPSULE ORAL DAILY
Refills: 0 | Status: DISCONTINUED | OUTPATIENT
Start: 2023-09-13 | End: 2023-09-14

## 2023-09-13 RX ORDER — INSULIN LISPRO 100/ML
VIAL (ML) SUBCUTANEOUS AT BEDTIME
Refills: 0 | Status: DISCONTINUED | OUTPATIENT
Start: 2023-09-13 | End: 2023-09-16

## 2023-09-13 RX ORDER — CEFEPIME 1 G/1
2000 INJECTION, POWDER, FOR SOLUTION INTRAMUSCULAR; INTRAVENOUS ONCE
Refills: 0 | Status: COMPLETED | OUTPATIENT
Start: 2023-09-13 | End: 2023-09-13

## 2023-09-13 RX ADMIN — ATORVASTATIN CALCIUM 20 MILLIGRAM(S): 80 TABLET, FILM COATED ORAL at 23:31

## 2023-09-13 RX ADMIN — TACROLIMUS 2 MILLIGRAM(S): 5 CAPSULE ORAL at 21:13

## 2023-09-13 RX ADMIN — SODIUM CHLORIDE 1000 MILLILITER(S): 9 INJECTION, SOLUTION INTRAVENOUS at 17:27

## 2023-09-13 RX ADMIN — ATOVAQUONE 1500 MILLIGRAM(S): 750 SUSPENSION ORAL at 22:24

## 2023-09-13 RX ADMIN — MYCOPHENOLATE MOFETIL 500 MILLIGRAM(S): 250 CAPSULE ORAL at 21:13

## 2023-09-13 RX ADMIN — CEFEPIME 100 MILLIGRAM(S): 1 INJECTION, POWDER, FOR SOLUTION INTRAMUSCULAR; INTRAVENOUS at 20:22

## 2023-09-13 RX ADMIN — Medication 500000 UNIT(S): at 21:13

## 2023-09-13 RX ADMIN — Medication 975 MILLIGRAM(S): at 15:56

## 2023-09-13 NOTE — CONSULT NOTE ADULT - ATTENDING COMMENTS
68 y/o male with h/o mixed NICMP/ICMP now s/p OHT 6/21/22, ACR 2R/3A (normal graft function) treated with pulse oral steroids 6/2023, DM 2, CKD stage 2-3 who was referred to the ED due to fevers. Pt reports low grade fevers since yesterday associated to back pain and malaise. He also reports dry cough and has been presenting intermittent diarrhea for 2 months. C. diff/O&P was negative. Pt denies any sick contacts. His wife and daughter traveled recently.     His physical exam is unremarkable except for fever. His labs show WBC 12.14 neut 81.3%. His last CMV titer last week was detectable but below threshold.     Prior cardiac testing includes:  6/23: TTE: EF 55-60% LVIDd 4.3cm, GLS is 19.8%, borderline reduced RVSF, mild-mod TR, compared to TTE 6/14/23 there have been no significant interval changes.   3/15/23 TTE EF 67% LVIDd 4.2cm, LVIDd 4.2cm, normal LVSF, normal RV function with decreased RVSF, mild TR, no pericardial effusion. Compared to TTE 1/4/23, RV size has improved, there is no change in RV function.    In summary this is a 68 y/o male with h/o OHT ’22, immunosuppressed status, priors ACR 2R/3A preserved graft function 6/2023, DM 2 and CKD 2 p/w low grade fever, cough and improved diarrhea. His physical exam is unremarkable. He had rejection ~3mo for which he received high dose steroids and antiviral/antibiotic prophylaxis which was discontinued.  DDx includes CMV viremia vs URI.   Recommendtaions:  -Admit to telemetry floor  - Infectious w/u including RVP, BxCx, UA/UxCx, CxR, CMV titer and stool studies  -C/s TxID  - IS: continue tacrolimus 1.5mg PO Qam and 2mg qpm Goal 8-10, decrease cellcept to 500mg BID  - Obtain TTE to assess graft function  - Continue Mepron 1500mg BID, can consider switching to Bactrim prior to DC  -Ok to hold farxiga  - We will continue to follow with you  -Plan as above

## 2023-09-13 NOTE — CONSULT NOTE ADULT - ASSESSMENT
WORK UP:      ANTIBIOTIC:      DIAGNOSIS and IMPRESSION:        RECOMMENDATIONS:        Above recommendations are Preliminary until Attending's Addendum which includes Final Recommendations      Wilder Quintanilla DO, PGY-5   ID fellow  [employer] Preferred  After 5pm/weekends call [phone]   66M with ACC/AHA Stage D mixed NICM/ICM s/p PPM upgraded to CRT-D, CAD s/p PCI to mLAD 4/2022, eventually s/p OHT on 6/21/22 (CMV +/+, Toxo -/-), DM, CKD, sent in to ED (9/13/23) for fever 100.5F at home with dry cough and malaise, Transplant ID consulted for assistance.    Patient reports developing dry cough and fever for the past 2 days  Reports some mild dysuria and flank pain  Has been also having intermittent bouts of diarrhea for 2 months, started after rejection in 6/2023, complicated by diarrhea 7/2023, tested positive for Norovirus, treated conservatively with fluids and watching monitoring  Repeat testing in 9/2023 outpatient showed GI PCR and Cdiff negative  Denies any recent sick contact  Denies recent travel  Reports COVID vaccinated only x2, primary vaccination without booster    DIAGNOSIS and IMPRESSION:  #Fever, Chills, Cough  #Dysuria, Flank Pain  #Mixed NICM/ICM s/p OHT on 6/21/22 (CMV +/+, Toxo -/-)    - broad differential, including viral or bacterial pneumonia, UTI/pyelonephritis, occult bacteremia   - diarrhea resolved, but still could be gastroenteritis   - hemodynamically stable, saturating well on RA    RECOMMENDATIONS:  - obtain CXR  - obtain UA/UCx  - obtain blood culture x2  - obtain Sputum Culture, RVP swab, MRSA swab, Legionella UAg, Strep UAg  - obtain CMV PCR  - if develops diarrhea, obtain GI PCR and Cdiff PCR  - if prelim CXR shows concerns for pneumonia, can start empiric cefepime and azithromycin  - if UA shows concern for UTI, can start empiric CTX    #Prophylaxis  - CMV +/+: completed 6 months of valcyte. CMV PCR < 34 (9/5/23), obtain repeat CMV PCR  - Toxo -/-: no ppx needed  - PJP: continuemepron 1500mg daily (bactrim was not trialed due to hyperkalemia and borderline elevated SCr)      Above recommendations are Preliminary until Attending's Addendum which includes Final Recommendations      Wilder Quintanilla DO, PGY-5   ID fellow  [employer] Teams Preferred  After 5pm/weekends call [phone]   66M with ACC/AHA Stage D mixed NICM/ICM s/p PPM upgraded to CRT-D, CAD s/p PCI to mLAD 4/2022, eventually s/p OHT on 6/21/22 (CMV +/+, Toxo -/-), DM, CKD, sent in to ED (9/13/23) for fever 100.5F at home with dry cough and malaise, Transplant ID consulted for assistance.    Patient reports developing dry cough and fever for the past 2 days  Reports some mild dysuria and flank pain  Has been also having intermittent bouts of diarrhea for 2 months, started after rejection in 6/2023, complicated by diarrhea 7/2023, tested positive for Norovirus, treated conservatively with fluids and watching monitoring  Repeat testing in 9/2023 outpatient showed GI PCR and Cdiff negative  Denies any recent sick contact  Denies recent travel  Reports COVID vaccinated only x2, primary vaccination without booster    DIAGNOSIS and IMPRESSION:  #Fever, Chills, Cough  #Dysuria, Flank Pain  #Mixed NICM/ICM s/p OHT on 6/21/22 (CMV +/+, Toxo -/-)    - broad differential, including viral or bacterial pneumonia, UTI/pyelonephritis, occult bacteremia   - diarrhea resolved, but still could be gastroenteritis   - hemodynamically stable, saturating well on RA    RECOMMENDATIONS:  - obtain CXR  - obtain UA/UCx  - obtain blood culture x2  - obtain Sputum Culture, RVP swab, MRSA swab, Legionella UAg, Strep UAg  - obtain CMV PCR  - if develops diarrhea, obtain GI PCR and Cdiff PCR  - if prelim CXR shows concerns for pneumonia, can start empiric cefepime and azithromycin  - if UA shows concern for UTI, can start empiric CTX    #Prophylaxis  - CMV +/+: completed 6 months of valcyte. CMV PCR < 34 (9/5/23), obtain repeat CMV PCR  - Toxo -/-: no ppx needed  - PJP: continuemepron 1500mg daily (bactrim was not trialed due to hyperkalemia and borderline elevated SCr)      Case d/w attending and primary team      Wilder Quintanilla DO, PGY-5   ID fellow  [employer] Preferred  After 5pm/weekends call [phone]   66M with ACC/AHA Stage D mixed NICM/ICM s/p PPM upgraded to CRT-D, CAD s/p PCI to mLAD 4/2022, eventually s/p OHT on 6/21/22 (CMV +/+, Toxo -/-), DM, CKD, sent in to ED (9/13/23) for fever 100.5F at home with dry cough and malaise, Transplant ID consulted for assistance.    Patient reports developing dry cough and fever for the past 2 days  Reports some mild dysuria and flank pain  Has been also having intermittent bouts of diarrhea for 2 months, started after rejection in 6/2023, complicated by diarrhea 7/2023, tested positive for Norovirus, treated conservatively with fluids and watching monitoring  Repeat testing in 9/2023 outpatient showed GI PCR and Cdiff negative  Denies any recent sick contact  Denies recent travel  Reports COVID vaccinated only x2, primary vaccination without booster    DIAGNOSIS and IMPRESSION:  #Fever, Chills, Cough  #Dysuria, Flank Pain  #Mixed NICM/ICM s/p OHT on 6/21/22 (CMV +/+, Toxo -/-)    - broad differential, including viral or bacterial pneumonia, UTI/pyelonephritis, occult bacteremia   - diarrhea resolved, but still could be gastroenteritis   - hemodynamically stable, saturating well on RA    RECOMMENDATIONS:  - obtain CXR  - obtain UA/UCx  - obtain blood culture x2  - obtain Sputum Culture, RVP swab, MRSA swab, Legionella UAg, Strep UAg  - obtain CMV PCR  - if develops diarrhea, obtain GI PCR and Cdiff PCR  - Empirical antibiotics based on prelim workup and/or if sepsis hemodynamics on evaluation  - if prelim CXR shows concerns for pneumonia, can start empiric cefepime and azithromycin  - if UA shows concern for UTI, can start empiric CTX     #Prophylaxis  - CMV +/+: completed 6 months of valcyte. CMV PCR < 34 (9/5/23), obtain repeat CMV PCR  - Toxo -/-: no ppx needed  - PJP: continuemepron 1500mg daily (bactrim was not trialed due to hyperkalemia and borderline elevated SCr)      Case d/w attending and primary team      Wilder Quintanilla DO, PGY-5   ID fellow  [employer] Preferred  After 5pm/weekends call [phone]

## 2023-09-13 NOTE — ED PROVIDER NOTE - PROGRESS NOTE DETAILS
Fabrice Wiggins PA-C: RVP is positive for coronavirus (not COVID-19).  UA is pending, sample at bedside and will be sent by nursing.  Patient endorsed to attached hospitalist for admission.

## 2023-09-13 NOTE — H&P ADULT - PROBLEM SELECTOR PLAN 6
- DVT ppx: lovenox  - diet: DASH  - dispo: medicine - resume home nifedipine 30mg qd - hold home nifedipine 30mg qd iso soft BP

## 2023-09-13 NOTE — CONSULT NOTE ADULT - PROBLEM SELECTOR RECOMMENDATION 9
Elevated WBC with left shift. Febrile while on immunosuppression for OHT.   -obtain RVP, sputum, Blood (2 cultures from separate sites), urine cultures, CMV, EBV.   -Start empiric broad spectrum ABX  -Consult to transplant ID -obtain RVP, sputum, Blood (2 cultures from separate sites), urine cultures, CMV, EBV.   -Start empiric broad spectrum ABX  -Consult to transplant ID Recent diarrheal illness that has resolved prior to admission. New Fevers, body aches, cough and sputum production. No urinary symptoms. No sick contacts, his Wife and Daughter recently returned from a trip from [place].     Recommendations:  -Blood Cultures (2 sets at 2 different sites), urine cultures, Sputum cultures.   -CXR  -RVP/MRSA swab, Legionella UAg, Strep UAg.   -Obtain CMV PCR  -If Diarrhea obtain GI PCR/Cdiff PCR  -Low threshold for broad spectrum abx.  -Hold Farxiga Recent diarrheal illness that has resolved prior to admission. New Fevers, body aches, cough and sputum production. No urinary symptoms. No sick contacts, his Wife and Daughter recently returned from a trip from [place].     Recommendations:  -Blood Cultures (2 sets at 2 different sites), urine cultures, Sputum cultures.   -CXR  -RVP/MRSA swab, Legionella UAg, Strep UAg.   -Obtain CMV PCR  -If Diarrhea obtain GI PCR/Cdiff PCR  -Low threshold for broad spectrum abx.  -Hold Farxiga  - TxID consult

## 2023-09-13 NOTE — ED ADULT TRIAGE NOTE - HEIGHT IN FEET
Patient Instructions by Yanet Santos APN at 09/14/17 03:26 PM     Author:  Yanet Santos APN Service:  (none) Author Type:  Nurse Practitioner     Filed:  09/14/17 03:27 PM Encounter Date:  9/14/2017 Status:  Signed     :  Yanet Santos APN (Nurse Practitioner)            Patient Instructions    No lovenox past Thursday Per Dr Bangura    Follow-Up  -- Make an appointment with Dr Bangura On Monday as already scheduled    For reliable and current breast information please utilize website: [email or web address]    Additional Educational Resources:  For additional resources regarding your symptoms, diagnosis, or further health information, please visit the Health Resources section on [email or web address] or the Online Health Resources section in [facility].              Revision History        User Key Date/Time User Provider Type Action    > [N/A] 09/14/17 03:27 PM Yanet Santos APN Nurse Practitioner Sign             5 Discharged

## 2023-09-13 NOTE — CONSULT NOTE ADULT - SUBJECTIVE AND OBJECTIVE BOX
Patient is a 67y old  Male who presents with a chief complaint of     HPI:  66M with ACC/AHA Stage D mixed NICM/ICM s/p PPM upgraded to CRT-D, CAD s/p PCI to mLAD 4/2022, eventually s/p OHT on 6/21/22 (CMV +/+, Toxo -/-), DM, CKD, sent in to ED (9/13/23) for fever 100.5F at home with dry cough and malaise, Transplant ID consulted for assistance.    Patient     Had norovirus infection in 7/2023, resolved on its own.       #prophylaxis  - CMV +/+: completed 6 months of valcyte. CMV PCR < 34 (9/5/23)  - Toxo -/-: no ppx needed  - PJP: continue mepron 1500mg daily (bactrim was not trialed due to hyperkalemia and borderline elevated SCr)  ?          prior hospital charts reviewed [  ]  primary team notes reviewed [  ]  other consultant notes reviewed [  ]    PAST MEDICAL & SURGICAL HISTORY:  AV block      Essential hypertension      Chronic HFrEF (heart failure with reduced ejection fraction)      Chronic kidney disease, unspecified CKD stage      CAD (coronary artery disease)      HLD (hyperlipidemia)      Type 2 diabetes mellitus      Artificial cardiac pacemaker      History of open heart surgery          Allergies  No Known Allergies    ANTIMICROBIALS (past 90 days)  MEDICATIONS  (STANDING):          MEDICATIONS  (STANDING):    SOCIAL HISTORY:   cigarettes  quit 17 years ago, denies ETOH and IVDU    FAMILY HISTORY:  Family history of acute myocardial infarction (Father)  72      REVIEW OF SYSTEMS  [  ] ROS unobtainable because:    [  ] All other systems negative except as noted below:	    Constitutional:  [ ] fever [ ] chills  [ ] weight loss  [ ] weakness  Skin:  [ ] rash [ ] phlebitis	  Eyes: [ ] icterus [ ] pain  [ ] discharge	  ENMT: [ ] sore throat  [ ] thrush [ ] ulcers [ ] exudates  Respiratory: [ ] dyspnea [ ] hemoptysis [ ] cough [ ] sputum	  Cardiovascular:  [ ] chest pain [ ] palpitations [ ] edema	  Gastrointestinal:  [ ] nausea [ ] vomiting [ ] diarrhea [ ] constipation [ ] pain	  Genitourinary:  [ ] dysuria [ ] frequency [ ] hematuria [ ] discharge [ ] flank pain  [ ] incontinence  Musculoskeletal:  [ ] myalgias [ ] arthralgias [ ] arthritis  [ ] back pain  Neurological:  [ ] headache [ ] seizures  [ ] confusion/altered mental status  Psychiatric:  [ ] anxiety [ ] depression	  Hematology/Lymphatics:  [ ] lymphadenopathy  Endocrine:  [ ] adrenal [ ] thyroid  Allergic/Immunologic:	 [ ] transplant [ ] seasonal    Vital Signs Last 24 Hrs  T(F): 100 (09-13-23 @ 11:20), Max: 100 (09-13-23 @ 11:20)  Vital Signs Last 24 Hrs  HR: 118 (09-13-23 @ 11:20) (118 - 118)  BP: 107/65 (09-13-23 @ 11:20) (107/65 - 107/65)  RR: 20 (09-13-23 @ 11:20)  SpO2: 97% (09-13-23 @ 11:20) (97% - 97%)  Wt(kg): --    PHYSICAL EXAM:       Patient is a 67y old  Male who presents with a chief complaint of     HPI:  66M with ACC/AHA Stage D mixed NICM/ICM s/p PPM upgraded to CRT-D, CAD s/p PCI to mLAD 4/2022, eventually s/p OHT on 6/21/22 (CMV +/+, Toxo -/-), DM, CKD, sent in to ED (9/13/23) for fever 100.5F at home with dry cough and malaise, Transplant ID consulted for assistance.    Patient reports developing dry cough and fever for the past 2 days  Has been also having intermittent bouts of diarrhea for 2 months, started after rejection in 6/2023, complicated by diarrhea 7/2023, tested positive for Norovirus, treated conservatively with fluids and watching monitoring  Repeat testing     Had norovirus infection in 7/2023, resolved on its own.       #prophylaxis  - CMV +/+: completed 6 months of valcyte. CMV PCR < 34 (9/5/23)  - Toxo -/-: no ppx needed  - PJP: continue mepron 1500mg daily (bactrim was not trialed due to hyperkalemia and borderline elevated SCr)  ?          prior hospital charts reviewed [  ]  primary team notes reviewed [  ]  other consultant notes reviewed [  ]    PAST MEDICAL & SURGICAL HISTORY:  AV block      Essential hypertension      Chronic HFrEF (heart failure with reduced ejection fraction)      Chronic kidney disease, unspecified CKD stage      CAD (coronary artery disease)      HLD (hyperlipidemia)      Type 2 diabetes mellitus      Artificial cardiac pacemaker      History of open heart surgery          Allergies  No Known Allergies    ANTIMICROBIALS (past 90 days)  MEDICATIONS  (STANDING):          MEDICATIONS  (STANDING):    SOCIAL HISTORY:   cigarettes  quit 17 years ago, denies ETOH and IVDU    FAMILY HISTORY:  Family history of acute myocardial infarction (Father)  72      REVIEW OF SYSTEMS  [  ] ROS unobtainable because:    [  ] All other systems negative except as noted below:	    Constitutional:  [ ] fever [ ] chills  [ ] weight loss  [ ] weakness  Skin:  [ ] rash [ ] phlebitis	  Eyes: [ ] icterus [ ] pain  [ ] discharge	  ENMT: [ ] sore throat  [ ] thrush [ ] ulcers [ ] exudates  Respiratory: [ ] dyspnea [ ] hemoptysis [ ] cough [ ] sputum	  Cardiovascular:  [ ] chest pain [ ] palpitations [ ] edema	  Gastrointestinal:  [ ] nausea [ ] vomiting [ ] diarrhea [ ] constipation [ ] pain	  Genitourinary:  [ ] dysuria [ ] frequency [ ] hematuria [ ] discharge [ ] flank pain  [ ] incontinence  Musculoskeletal:  [ ] myalgias [ ] arthralgias [ ] arthritis  [ ] back pain  Neurological:  [ ] headache [ ] seizures  [ ] confusion/altered mental status  Psychiatric:  [ ] anxiety [ ] depression	  Hematology/Lymphatics:  [ ] lymphadenopathy  Endocrine:  [ ] adrenal [ ] thyroid  Allergic/Immunologic:	 [ ] transplant [ ] seasonal    Vital Signs Last 24 Hrs  T(F): 100 (09-13-23 @ 11:20), Max: 100 (09-13-23 @ 11:20)  Vital Signs Last 24 Hrs  HR: 118 (09-13-23 @ 11:20) (118 - 118)  BP: 107/65 (09-13-23 @ 11:20) (107/65 - 107/65)  RR: 20 (09-13-23 @ 11:20)  SpO2: 97% (09-13-23 @ 11:20) (97% - 97%)  Wt(kg): --    PHYSICAL EXAM:       Patient is a 67y old  Male who presents with a chief complaint of     HPI:  66M with ACC/AHA Stage D mixed NICM/ICM s/p PPM upgraded to CRT-D, CAD s/p PCI to mLAD 4/2022, eventually s/p OHT on 6/21/22 (CMV +/+, Toxo -/-), DM, CKD, sent in to ED (9/13/23) for fever 100.5F at home with dry cough and malaise, Transplant ID consulted for assistance.    Patient reports developing dry cough and fever for the past 2 days  Reports some mild dysuria and flank pain  Has been also having intermittent bouts of diarrhea for 2 months, started after rejection in 6/2023, complicated by diarrhea 7/2023, tested positive for Norovirus, treated conservatively with fluids and watching monitoring  Repeat testing in 9/2023 outpatient showed GI PCR and Cdiff negative  Denies any recent sick contact  Denies recent travel  Reports COVID vaccinated only x2, primary vaccination without booster    prior hospital charts reviewed [ x ]  primary team notes reviewed [ x ]  other consultant notes reviewed [x  ]    PAST MEDICAL & SURGICAL HISTORY:  AV block      Essential hypertension      Chronic HFrEF (heart failure with reduced ejection fraction)      Chronic kidney disease, unspecified CKD stage      CAD (coronary artery disease)      HLD (hyperlipidemia)      Type 2 diabetes mellitus      Artificial cardiac pacemaker      History of open heart surgery          Allergies  No Known Allergies    ANTIMICROBIALS (past 90 days)  MEDICATIONS  (STANDING):          MEDICATIONS  (STANDING):    SOCIAL HISTORY:   cigarettes  quit 17 years ago, former ETOH drinker, denies IVDU    FAMILY HISTORY:  Family history of acute myocardial infarction (Father)  72      REVIEW OF SYSTEMS  [  ] ROS unobtainable because:    [x  ] All other systems negative except as noted below:	    Constitutional:  [x ] fever [x ] chills  [ ] weight loss  [ ] weakness  Skin:  [ ] rash [ ] phlebitis	  Eyes: [ ] icterus [ ] pain  [ ] discharge	  ENMT: [ ] sore throat  [ ] thrush [ ] ulcers [ ] exudates  Respiratory: [ x] dyspnea [ ] hemoptysis [x] cough [ ] sputum	  Cardiovascular:  [ ] chest pain [ ] palpitations [ ] edema	  Gastrointestinal:  [ ] nausea [ ] vomiting [ ] diarrhea [ ] constipation [ ] pain	  Genitourinary:  [x ] dysuria [ ] frequency [ ] hematuria [ ] discharge [x ] flank pain  [ ] incontinence  Musculoskeletal:  [ ] myalgias [ ] arthralgias [ ] arthritis  [ ] back pain  Neurological:  [ ] headache [ ] seizures  [ ] confusion/altered mental status  Psychiatric:  [ ] anxiety [ ] depression	  Hematology/Lymphatics:  [ ] lymphadenopathy  Endocrine:  [ ] adrenal [ ] thyroid  Allergic/Immunologic:	 [ ] transplant [ ] seasonal    Vital Signs Last 24 Hrs  T(F): 100 (09-13-23 @ 11:20), Max: 100 (09-13-23 @ 11:20)  Vital Signs Last 24 Hrs  HR: 118 (09-13-23 @ 11:20) (118 - 118)  BP: 107/65 (09-13-23 @ 11:20) (107/65 - 107/65)  RR: 20 (09-13-23 @ 11:20)  SpO2: 97% (09-13-23 @ 11:20) (97% - 97%)  Wt(kg): --    Physical Exam:  Constitutional:  well preserved, comfortable  Head/Eyes: no icterus  ENT:  supple, no cervical lymphadenopathy   LUNGS:  CTA  CVS:  regular rhythm, no murmur  Abd:  soft, non-tender; non-distended  Ext:  no edema  Vascular:  IV site no erythema tenderness or discharge  MSK:  joints without swelling  Neuro: AAO X 3, non- focal

## 2023-09-13 NOTE — H&P ADULT - HISTORY OF PRESENT ILLNESS
67M with history of mixed NICM/ICM s/p PPM upgraded to CRT-D, CAD s/p PCI to mLAD in 4/2022, DM, CKD stage 3, and VT now s/p OHT on 6/21/22 (CMV +/+, toxo -/-) sent to ED from HF clinic due to fever.  67M with history of mixed NICM/ICM s/p PPM upgraded to CRT-D, CAD s/p PCI to mLAD in 4/2022, DM, CKD stage 3, and VT now s/p OHT on 6/21/22 (CMV +/+, toxo -/-) sent to ED from HF clinic due to fever. Patient states he started feeling ill around Sunday. He had sinus congestion with cough productive of some whitish sputum.  67M with history of mixed NICM/ICM s/p PPM upgraded to CRT-D, CAD s/p PCI to mLAD in 4/2022, DM, CKD stage 3, and VT now s/p OHT on 6/21/22 (CMV +/+, toxo -/-) sent to ED from HF clinic due to fever. Patient states he started feeling ill on Sunday. He had nasal congestion with cough productive of some whitish sputum. No SOB. He then started having bodyaches with chills and spiked a fever or 100.5. He has been having chronic watery diarrhea for ~2 months. They were told this could be related to uptitration of immunosuppressants. He has average ~2 watery stools/day. Denies abdominal pain, n/v, dysuria, rash. He lives at home with family, no recent sick contact or travel history. His wife/daughter traveled out of state recently. Minimally eating for past few days due to low appetite.     VS: Tmax , , /65, RR 20, SpO2 97% on RA  Labs: WBC 12.99, UA neg, RVP with coronavirus  Imaging: CXR no focal consolidation  Admitted to medicine for further management of viral URI.  67M with history of DM, CKD stage 3, mixed NICM/ICM s/p PPM upgraded to CRT-D, CAD s/p PCI to mLAD in 4/2022, and VT now s/p OHT on 6/21/22 (CMV +/+, toxo -/-), recent biopsy c/f transplant reject 7/2023 sent to ED from HF clinic due to fever. Patient states he started feeling ill on Sunday. He had nasal congestion with cough productive of some whitish sputum. No SOB. He then started having bodyaches with chills and spiked a fever or 100.5. He has been having chronic watery diarrhea for ~2 months. They were told this could be related to uptitration of immunosuppressants. He has average ~2 watery stools/day, not necessarily worsened past few days. Denies abdominal pain, n/v, dysuria, rash. He lives at home with family, no recent sick contact or travel history. His wife/daughter traveled out of state recently. Minimally eating for past few days due to low appetite.     VS: Tmax , , /65, RR 20, SpO2 97% on RA  Labs: WBC 12.99, UA neg, RVP with coronavirus  Imaging: CXR no focal consolidation  Admitted to medicine for further management of viral URI.  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 Patient states he started feeling ill on Sunday. He had nasal congestion with cough productive of some whitish sputum. No SOB. He then started having bodyaches with chills and spiked a fever or 100.5. He has been having chronic watery diarrhea for ~2 months. They were told this could be related to uptitration of immunosuppressants. He has average ~2 watery stools/day, not necessarily worsened past few days. Denies abdominal pain, n/v, dysuria, rash. He lives at home with family, no recent sick contact or travel history. His wife/daughter traveled out of [facility] recently. Minimally eating for past few days due to low appetite.     VS: Tmax , , /65, RR 20, SpO2 97% on RA  Labs: WBC 12.99, UA neg, RVP with coronavirus  Imaging: CXR no focal consolidation  Admitted to medicine for further management of viral URI.

## 2023-09-13 NOTE — H&P ADULT - ASSESSMENT
67M with history of mixed NICM/ICM s/p PPM upgraded to CRT-D, CAD s/p PCI to mLAD in 4/2022, DM, CKD stage 3, and VT now s/p OHT on 6/21/22 (CMV +/+, toxo -/-) sent to ED from HF clinic due to fever iso immunosuppression, admitted to medicine for infectious work-up. 67M with history of DM, CKD stage 3, mixed NICM/ICM s/p PPM upgraded to CRT-D, CAD s/p PCI to mLAD in 4/2022, and VT now s/p OHT on 6/21/22 (CMV +/+, toxo -/-), recent biopsy c/f transplant reject 7/2023 sent to ED from HF clinic due to fever iso immunosuppression, admitted to medicine for infectious work-up.

## 2023-09-13 NOTE — H&P ADULT - PROBLEM SELECTOR PLAN 1
- leukocytosis & tachycardic meeting SIRS criteria  - fever with cough & congestion  - RVP + coronavirus (non-COVID)  - UA negative  - CXR no focal consolidation  - continue empiric cefepime (9/13- ) as per transplant ID until further infectious work-up  - follow up blood & urine cx, urine legionella/strep, CMV titer, fungal studies  - send GI PCR/c.diff with next stool sample - leukocytosis & tachycardic meeting SIRS criteria  - fever with cough & congestion  - RVP + coronavirus (non-COVID)  - UA negative  - CXR no focal consolidation  - s/p 1L in ED, continue 100cc/hr for 10 hours  - continue empiric cefepime (9/13- ) as per transplant ID until further infectious work-up  - follow up blood & urine cx, urine legionella/strep, CMV titer, fungal studies  - send GI PCR/c.diff with next stool sample

## 2023-09-13 NOTE — H&P ADULT - NSHPLABSRESULTS_GEN_ALL_CORE
LABS:                         14.5   12.14 )-----------( 223      ( 13 Sep 2023 15:36 )             43.4         135  |  100  |  12  ----------------------------<  119<H>  4.4   |  22  |  1.18    Ca    9.7      13 Sep 2023 15:36    TPro  7.2  /  Alb  4.2  /  TBili  0.9  /  DBili  x   /  AST  11  /  ALT  10  /  AlkPhos  83  09-13    PT/INR - ( 13 Sep 2023 15:36 )   PT: 12.7 sec;   INR: 1.16 ratio         PTT - ( 13 Sep 2023 15:36 )  PTT:27.5 sec  Urinalysis Basic - ( 13 Sep 2023 18:49 )    Color: Colorless / Appearance: Clear / S.004 / pH: x  Gluc: x / Ketone: Negative  / Bili: Negative / Urobili: Negative   Blood: x / Protein: Negative / Nitrite: Negative   Leuk Esterase: Negative / RBC: x / WBC x   Sq Epi: x / Non Sq Epi: x / Bacteria: x    RADIOLOGY, EKG & ADDITIONAL TESTS: Reviewed. Personally reviewed imaging, labs, ekg.  R axis deviation. ST rate 108. RBBB. TWI in R/V1-V4. STD in V2-V3. similar to prior ekg in July.    LABS:                         14.5   12.14 )-----------( 223      ( 13 Sep 2023 15:36 )             43.4         135  |  100  |  12  ----------------------------<  119<H>  4.4   |  22  |  1.18    Ca    9.7      13 Sep 2023 15:36    TPro  7.2  /  Alb  4.2  /  TBili  0.9  /  DBili  x   /  AST  11  /  ALT  10  /  AlkPhos  83      PT/INR - ( 13 Sep 2023 15:36 )   PT: 12.7 sec;   INR: 1.16 ratio         PTT - ( 13 Sep 2023 15:36 )  PTT:27.5 sec  Urinalysis Basic - ( 13 Sep 2023 18:49 )    Color: Colorless / Appearance: Clear / S.004 / pH: x  Gluc: x / Ketone: Negative  / Bili: Negative / Urobili: Negative   Blood: x / Protein: Negative / Nitrite: Negative   Leuk Esterase: Negative / RBC: x / WBC x   Sq Epi: x / Non Sq Epi: x / Bacteria: x    RADIOLOGY, EKG & ADDITIONAL TESTS: Reviewed.

## 2023-09-13 NOTE — H&P ADULT - ATTENDING COMMENTS
Pt was seen and examined during key portion of E/M service. Case discussed with resident. H&P reviewed and edited where appropriate. Other than the following, I agree with the above history, exam, assessment, and plan.  67M with history of DM2, CKD stage 3, mixed NICM/ICM s/p PPM upgraded to CRT-D, CAD s/p PCI to mLAD in 4/2022, and VT now s/p OHT on 6/21/22 (CMV +/+, toxo -/-), recent biopsy c/f "mild transplant rejection" 7/2023 pw sepsis 2/2 coronavirus. f/u cultures and infectious serologies. cont cefepime for now. cont home prograf and reduced cellcept. prograf level in AM. holding nifedipine for now.

## 2023-09-13 NOTE — ED PROVIDER NOTE - PHYSICAL EXAMINATION
GEN: Pt non-toxic in NAD, alert.  PSYCH: Affect and mood appropriate.  EYES: Sclera white w/o injection, EOMI.  ENT: Neck supple. Airway patent.  RESP: CTA b/l, no wheezes, rales, or rhonchi.   CARDIAC: RRR, clear distinct S1, S2, no appreciable murmurs.  ABD: Soft, non-tender. No CVAT b/l.  MSK: BR[place]ORD spontaneously.  NEURO: No focal motor or sensory deficits.  VASC: Strong distal pulses. No edema.  SKIN: No rashes or lesions.

## 2023-09-13 NOTE — CONSULT NOTE ADULT - SUBJECTIVE AND OBJECTIVE BOX
Patient seen and evaluated at bedside    Reason for consult: Febrile pt on OHT    HPI:  67 YO M with a history of ACC/AHA Stage D mixed NICM/ICM (likely familial with strong FH and early arrhythmia history in his 30's) with LVED 5.2 cm and LVEF 10-15% s/p PPM upgraded to CRT-D, CAD s/p PCI to mLAD 4/2022, well controlled DM2 (A1c 6.2%), and CKD III (Cr 1.4) and VT who is now s/p OHT on 6/21/22 (ischemic time 261 mins, CMV +/+, Toxo -/-)    Pt initially presented to [facility] 5/20 with near syncope in setting of worsening HF symptoms and found to have 41 episodes of VT, many terminating with ATP. LHC did not reveal new obstructive CAD and he underwent EPS which did not reveal endocardial substrate amenable for ablation. RHC revealed severely depressed cardiac output and he was transferred to [facility] 5/26 for advanced therapies evaluation. IABP was placed and he was listed UNOS status 2e for OHT. He was briefly made status 7 as he became febrile without leukocytosis, then bld cx negative x48hrs he was re-activated UNOS status 2e.  ?  A suitable donor was identified and patient underwent OHT with Dr. Earl/Maria C on 6/21/22 (ischemic time 261 mins, CMV +/+, Toxo -/-). Patient was successfully extubated and IABP was removed on POD 1. Cultures from the ICD site in the OR were positive for staph epidermidis/morganella morganii and he was treated with IV Vancomycin and Cefepime. CT scan demonstrated residual fibrous capsule in the region of the previous cardiac device in the left chest wall. Pt was then discharged home on oral antibiotics on 7/8/22.      PMHx:   AV block    Essential hypertension    Pure hypercholesterolemia    Myocardial infarct, old    Chronic HFrEF (heart failure with reduced ejection fraction)    Chronic kidney disease, unspecified CKD stage    CAD (coronary artery disease)    HLD (hyperlipidemia)    Type 2 diabetes mellitus        PSHx:   Artificial cardiac pacemaker    History of open heart surgery        Allergies:  No Known Allergies      Home Meds:        FAMILY HISTORY:  Family history of acute myocardial infarction (Father) 72      REVIEW OF SYSTEMS:  CONSTITUTIONAL: No weakness, fevers or chills  EYES/ENT: No visual changes;  No dysphagia  NECK: No pain or stiffness  RESPIRATORY: No cough, wheezing, hemoptysis; No shortness of breath  CARDIOVASCULAR: No chest pain or palpitations; No lower extremity edema  GASTROINTESTINAL: No abdominal or epigastric pain. No nausea, vomiting  BACK: No back pain  GENITOURINARY: No dysuria, frequency or hematuria  NEUROLOGICAL: No numbness or weakness  SKIN: No itching, burning, rashes, or lesions   All other review of systems is negative unless indicated above.      Physical Exam:  T(F): 100 (09-13), Max: 100 (09-13)  HR: 118 (09-13) (118 - 118)  BP: 107/65 (09-13) (107/65 - 107/65)  RR: 20 (09-13)  SpO2: 97% (09-13)  GENERAL: No acute distress, well-developed  HEAD:  Atraumatic, Normocephalic  ENT: EOMI, PERRLA, conjunctiva and sclera clear, Neck supple, No JVD, moist mucosa  CHEST/LUNG: Clear to auscultation bilaterally; No wheeze, equal breath sounds bilaterally   BACK: No spinal tenderness  HEART: Regular rate and rhythm; No murmurs, rubs, or gallops  ABDOMEN: Soft, Nontender, Nondistended; Bowel sounds present  EXTREMITIES:  No clubbing, cyanosis, or edema  PSYCH: Nl behavior, nl affect  NEUROLOGY: AAOx3, non-focal, cranial nerves intact  SKIN: Normal color, No rashes or lesions      CXR: Personally reviewed    Labs: Personally reviewed                   Patient seen and evaluated at bedside    Reason for consult: Febrile pt on OHT    HPI:  67 YO M with a history of ACC/AHA Stage D mixed NICM/ICM (likely familial with strong FH and early arrhythmia history in his 30's) with LVED 5.2 cm and LVEF 10-15% s/p PPM upgraded to CRT-D, CAD s/p PCI to mLAD 4/2022, well controlled DM2 (A1c 6.2%), and CKD III (Cr 1.4) and VT who is now s/p OHT on 6/21/22 (ischemic time 261 mins, CMV +/+, Toxo -/-)    Patient discovered to have asymptomatic Grade 2R/3A on 6/28 EMB. His immunosupression meds were esclated to Cellcept 750 then 1000mg BID, Prednisone 100mg x3 than tapered and he was restarted on valcyte and nystatin prophylaxis. On recent outpt labs it is worth noting that his CMV PCR became detectable but below threshold.       PMHx:   AV block    Essential hypertension    Pure hypercholesterolemia    Myocardial infarct, old    Chronic HFrEF (heart failure with reduced ejection fraction)    Chronic kidney disease, unspecified CKD stage    CAD (coronary artery disease)    HLD (hyperlipidemia)    Type 2 diabetes mellitus        PSHx:   Artificial cardiac pacemaker    History of open heart surgery        Allergies:  No Known Allergies      Home Meds:        FAMILY HISTORY:  Family history of acute myocardial infarction (Father) 72      REVIEW OF SYSTEMS:  CONSTITUTIONAL: +Fevers  EYES/ENT: No visual changes;  No dysphagia  NECK: No pain or stiffness  RESPIRATORY: + cough with sputum production. No SOB  CARDIOVASCULAR: No chest pain or palpitations; No lower extremity edema  GASTROINTESTINAL: No abdominal or epigastric pain. No nausea, vomiting. Recent diarrhea episode that has resolved.   BACK: No back pain  GENITOURINARY: No dysuria, frequency or hematuria  NEUROLOGICAL: No numbness or weakness  SKIN: No itching, burning, rashes, or lesions   All other review of systems is negative unless indicated above.      Physical Exam:  T(F): 100 (09-13), Max: 100 (09-13)  HR: 118 (09-13) (118 - 118)  BP: 107/65 (09-13) (107/65 - 107/65)  RR: 20 (09-13)  SpO2: 97% (09-13)  GENERAL: No acute distress, well-developed  HEAD:  Atraumatic, Normocephalic  ENT: EOMI, PERRLA, conjunctiva and sclera clear, Neck supple, No JVD, moist mucosa  CHEST/LUNG: Clear to auscultation bilaterally; No wheeze, equal breath sounds bilaterally   BACK: No spinal tenderness  HEART: Regular rate and rhythm; No murmurs  ABDOMEN: Soft, Nontender, Nondistended; Bowel sounds present  EXTREMITIES:  No clubbing, cyanosis, or edema  PSYCH: Nl behavior, nl affect  NEUROLOGY: AAOx3, non-focal, cranial nerves intact  SKIN: Normal color, No rashes or lesions      CXR: Personally reviewed    Labs: Personally reviewed

## 2023-09-13 NOTE — ED PROVIDER NOTE - NS ED ATTENDING STATEMENT MOD
This was a shared visit with the REBECA. I reviewed and verified the documentation and independently performed the documented:

## 2023-09-13 NOTE — CONSULT NOTE ADULT - PROBLEM SELECTOR RECOMMENDATION 2
10/20/22 RHC showed normal hemodynamics and EMBx Grade 1R/1A  On Sildenafil 10mg TID for pHTN pre-transplant  TTE 3/23 with LVEF 67%, normal RV function mildly decreased. LVIDd 4.5cm EMB biopsy from June with   On Sildenafil 10mg TID for pHTN pre-transplant  TTE 3/23 with LVEF 67%, normal RV function mildly decreased. LVIDd 4.5cm EMB biopsy from June with ISHLT Grade 2R/3A.  On Sildenafil 10mg TID for pHTN pre-transplant  TTE 6/21/23: LVEF 60%, G1DD, reduced RV systolic function. Global longitudinal strain is -17.6 %  -Repeat TTE EMB biopsy from June with ISHLT Grade 2R/3A treated with steroid pulse  On Sildenafil 10mg TID for pHTN pre-transplant  TTE 6/21/23: LVEF 60%, G1DD, reduced RV systolic function. Global longitudinal strain is -17.6 %  -Repeat TTE

## 2023-09-13 NOTE — H&P ADULT - PROBLEM SELECTOR PLAN 3
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, follow up heart transplant recs  - HF & transplant ID following - continue home tacrolimus, check tacro lvl  - decrease cellcept to 500mg bid  - continue home mepron & nystatin - mixed NICM/ICM s/p PPM upgraded to CRT-D, episodes of VT s/p OHT in 6/2022  - on tacrolimus & cellcept for immunosuppression  - on mepron and nystatin for prophylaxis  - 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

## 2023-09-13 NOTE — ED PROVIDER NOTE - CLINICAL SUMMARY MEDICAL DECISION MAKING FREE TEXT BOX
This is a heart transplant patient with a fever cough and malaise.  Basic blood work including CBC CMP cardiac enzymes and viral swab.  Discuss with transplant team.  Plan for admission.–Reinaldo Zamorano

## 2023-09-13 NOTE — CONSULT NOTE ADULT - PROBLEM SELECTOR RECOMMENDATION 3
Tacro trough goal 8-10  Hold Cellcept 500mg BID  consult for PharmD assistance -Tacro 1.5mg am, 2mg pM trough goal 8-10  -Decrease Cellcept to 500mg BID  -Consult for PharmD assistance

## 2023-09-13 NOTE — H&P ADULT - PROBLEM SELECTOR PLAN 5
- resume home nifedipine 30mg qd - on home metformin & farxiga  - hold home oral agents  - start SSI, fingersticks  - CC diet

## 2023-09-13 NOTE — H&P ADULT - NSHPPHYSICALEXAM_GEN_ALL_CORE
GENERAL: NAD, well-groomed, well-developed  HEAD: Atraumatic, Normocephalic  EYES: EOMI, PERRLA, conjunctiva and sclera clear  ENMT: No tonsillar erythema, exudates, or enlargement; Moist mucous membranes, Good dentition, No lesions  NECK: Supple, No JVD, Normal thyroid  CHEST/LUNG: Clear to percussion bilaterally; No rales, rhonchi, wheezing, or rubs  HEART: Regular rate and rhythm; No murmurs, rubs, or gallops  ABDOMEN: Soft, Nontender, Nondistended; Bowel sounds present  EXTREMITIES:  2+ Peripheral Pulses, No clubbing, cyanosis, or edema  LYMPH: No lymphadenopathy noted  SKIN: No rashes or lesions  NERVOUS SYSTEM: AAOx3; Motor Strength 5/5 B/L upper and lower extremities; DTRs 2+ intact and symmetric GENERAL: NAD, well-groomed, well-developed  HEAD: atraumatic, normocephalic  EYES: EOMI, PERRLA, sclera clear  ENMT: no tonsillar erythema/exudate, MMM  NECK: supple  LUNG: clear to auscultation bilaterally, no wheezes or crackles  HEART: +s1/s2, regular rate and rhythm  ABDOMEN: soft, non-tender, non-distended  EXTREMITIES: no peripheral edema bilaterally  SKIN: no rashes or lesions  NEURO: answering questions appropriately, following commands

## 2023-09-13 NOTE — ED ADULT NURSE NOTE - NSFALLHARMRISKINTERV_ED_ALL_ED

## 2023-09-13 NOTE — ED CLERICAL - NS ED CLERK NOTE PRE-ARRIVAL INFORMATION; ADDITIONAL PRE-ARRIVAL INFORMATION
CC/Reason For referral: Fever x3 days, HX HF- followed by [facility] HF team  Preferred Consultant(if applicable): HF NP, cell: [phone]  Would you still like to speak to an ED attending? YES, please call HF NP, Cell: [phone]

## 2023-09-13 NOTE — H&P ADULT - NSHPSOCIALHISTORY_GEN_ALL_CORE
lives at home with wife and daughter  currently unemployed  remote cigarette smoking, quit in 2006  no EtOH use  ambulates independently, no falls

## 2023-09-13 NOTE — ED PROVIDER NOTE - OBJECTIVE STATEMENT
67y F pmhx ACC/AHA Stage D mixed NICM/ICM s/p PPM upgraded to CRT-D, CAD s/p PCI to mLAD 4/2022, eventually s/p OHT on 6/21/22 (CMV +/+, Toxo -/-), DM, CKD, referred to ED by HF team for fever 100.5F at home today with dry cough and malaise. No sick contacts. Reports diarrhea over last few months after increasing cellcept and tacrolimus. Denies chest pain, sob, LE edema, abd pain, nv, dysuria.

## 2023-09-13 NOTE — H&P ADULT - NSHPREVIEWOFSYSTEMS_GEN_ALL_CORE
CONSTITUTIONAL: No fever, weight loss, or fatigue  EYES: No eye pain, visual disturbances, or discharge  ENMT:  No difficulty hearing, tinnitus, vertigo; No sinus or throat pain  NECK: No pain or stiffness  BREASTS: No pain, masses, or nipple discharge  RESPIRATORY: No cough, wheezing, chills or hemoptysis; No shortness of breath  CARDIOVASCULAR: No chest pain, palpitations, dizziness, or leg swelling  GASTROINTESTINAL: No abdominal or epigastric pain. No nausea, vomiting, or hematemesis; No diarrhea or constipation. No melena or hematochezia.  GENITOURINARY: No dysuria, frequency, hematuria, or incontinence  NEUROLOGICAL: No headaches, memory loss, loss of strength, numbness, or tremors  SKIN: No itching, burning, rashes, or lesions   LYMPH NODES: No enlarged glands  ENDOCRINE: No heat or cold intolerance; No hair loss  MUSCULOSKELETAL: No joint pain or swelling; No muscle, back, or extremity pain  PSYCHIATRIC: No depression, anxiety, mood swings, or difficulty sleeping  HEME/LYMPH: No easy bruising, or bleeding gums  ALLERY AND IMMUNOLOGIC: No hives or eczema Constitutional: +fever, +chills  Eyes: no blurry or double vision  ENMT: no sore throat or hearing changes  Respiratory: +dry cough, no shortness of breath  Cardiovascular: no chest pain or palpitations  Gastrointestinal: no abdominal pain, +chronic diarrhea, no n/v  Genitourinary: no dysuria or hematuria   Neuro: no dizziness or headache  Skin: no rash or lesions  Heme: no easy bleeding

## 2023-09-13 NOTE — H&P ADULT - NSICDXPASTMEDICALHX_GEN_ALL_CORE_FT
PAST MEDICAL HISTORY:  AV block     CAD (coronary artery disease)     Chronic HFrEF (heart failure with reduced ejection fraction)     Chronic kidney disease, unspecified CKD stage     Essential hypertension     History of heart transplant     HLD (hyperlipidemia)     Type 2 diabetes mellitus

## 2023-09-13 NOTE — H&P ADULT - PROBLEM SELECTOR PLAN 4
- on home metformin & farxiga  - hold home oral agents  - start SSI, fingersticks  - CC diet - Cr 1.18, at baseline 1.1-1.5  - monitor daily Cr/UOP

## 2023-09-13 NOTE — H&P ADULT - PROBLEM SELECTOR PLAN 2
- mixed NICM/ICM s/p PPM upgraded to CRT-D, episodes of VT s/p OHT in 6/2022  - on tacrolimus & cellcept for immunosuppression  - on mepron and nystatin for prophylaxis  - HF & transplant ID following - continue home tacrolimus, check tacro lvl  - decrease cellcept to 500mg bid  - continue home mepron & nystatin - corona virus positive  - mostly upper airway symptoms, clear lungs  - SpO2 appropriately on RA  - conservative management with PRN inhaler - corona virus positive  - mostly upper airway symptoms, clear lungs  - SpO2 appropriately on RA  - PRN inhaler

## 2023-09-13 NOTE — ED ADULT NURSE NOTE - OBJECTIVE STATEMENT
Patient is a 67y male presenting to the ED from home with c/o fever. Patient A&Ox4. PMH of heart transplant 06/2022, DM, CKD. Patient is speaking coherently in full sentences. Reports developing a fever starting this morning accompanied by malaise, productive cough, and low back pain starting last night. Reports a max oral temperature of 100.5 degrees Fahrenheit. Also reports intermittent diarrhea x 2 months, reports stretches of days with diarrhea every day followed by several consecutive days of no diarrhea. Denies chest pain, SOB, abdominal pain, headache, N/V, burning upon urination or difficulty urinating. Respirations spontaneous, even, and non-labored. Abdomen soft, non-distended and non-tender upon palpation.

## 2023-09-13 NOTE — ED PROVIDER NOTE - NSICDXPASTMEDICALHX_GEN_ALL_CORE_FT
PAST MEDICAL HISTORY:  AV block     CAD (coronary artery disease)     Chronic HFrEF (heart failure with reduced ejection fraction)     Chronic kidney disease, unspecified CKD stage     Essential hypertension     History of heart transplant     HLD (hyperlipidemia)     Need for prophylactic measure     Type 2 diabetes mellitus

## 2023-09-13 NOTE — CONSULT NOTE ADULT - ATTENDING COMMENTS
66M with ACC/AHA Stage D mixed NICM/ICM s/p PPM upgraded to CRT-D, CAD s/p PCI to mLAD 4/2022, eventually s/p OHT on 6/21/22 (CMV +/+, Toxo -/-), course most recently complicated with rejection treated with pulse dose steroids 6.2023, , norovirus in July with now resolved diarrhea , sent to ER with fever, cough , sore throat, myalgias for few days as well as new symptoms of dysuria. off valcyte ppx .    Agree with RVp, CMV PCR in plasma and Adenovirus in plasma (later ordered by myself)  CXR, BCx2, UA/UC  if RVp unrevealing and preliminary workup suspicious for pneumonia and or UTI or hemodynamics concerning for sepsis- begin antimicrobials as noted above  If CXR concerning, will need CT chest  Gi PCR if diarrhea develops    Follow CrAg, aspergillus Gm, fungitell sent.         Thank you for involving us in the care of this patient  Transplant ID will continue to follow  Please call or page with additional questions  Pager; #[phone]  Teams: from 8 am to 5 pm  Keke Escamilla MD 66M with ACC/AHA Stage D mixed NICM/ICM s/p PPM upgraded to CRT-D, CAD s/p PCI to mLAD 4/2022, eventually s/p OHT on 6/21/22 (CMV +/+, Toxo -/-), course most recently complicated with rejection treated with pulse dose steroids 6.2023, then on higher cellcept ./tacr,  norovirus in July with now resolved diarrhea , sent to ER with fever, cough , sore throat, myalgias for few days as well as new symptoms of dysuria. recently compelted short course of valcyte ppx and now  .    Agree with RVp, CMV PCR in plasma and Adenovirus in plasma (later ordered by myself)  CXR, BCx2, UA/UC  sputum cx  if RVp unrevealing and preliminary workup suspicious for pneumonia and or UTI or hemodynamics concerning for sepsis- begin antimicrobials as noted above  If CXR concerning, will need CT chest  Gi PCR if diarrhea develops    Follow CrAg, aspergillus Gm, fungitell sent.         Thank you for involving us in the care of this patient  Transplant ID will continue to follow  Please call or page with additional questions  Pager; #[phone]  Teams: from 8 am to 5 pm  Keke Escamilla MD 66M with ACC/AHA Stage D mixed NICM/ICM s/p PPM upgraded to CRT-D, CAD s/p PCI to mLAD 4/2022, eventually s/p OHT on 6/21/22 (CMV +/+, Toxo -/-), course most recently complicated with rejection treated with pulse dose steroids 6.2023, then on higher cellcept ./tacr,  norovirus in July with now resolved diarrhea , sent to ER with fever, cough , sore throat, myalgias for few days as well as new symptoms of dysuria. recently compelted short course of valcyte ppx and now  .    Agree with RVp, CMV PCR in plasma and Adenovirus in plasma (later ordered by myself)  CXR, BCx2, UA/UC  sputum cx  If CXR concerning, will need CT chest  Gi PCR if diarrhea develops  WBC now returned at 12, was 7 last week- in tlight of this, recommend starting cefepime now .     Follow CrAg, aspergillus Gm, fungitell sent.         Thank you for involving us in the care of this patient  Transplant ID will continue to follow  Please call or page with additional questions  Pager; #[phone]  Teams: from 8 am to 5 pm  Keke Escamilla MD

## 2023-09-13 NOTE — CONSULT NOTE ADULT - PROBLEM SELECTOR RECOMMENDATION 4
- CMV +/+: completed 6 months of valcyte. CMV PCR last negative (2/23/23)  ?- Toxo -/-: no ppx needed  ?- PJP: continue mepron 1500mg daily continue Mepron 1500mg BID continue Mepron 1500mg BID. Will consider switching to Bactrim prior to DC.   Had h/o hyperK and JAGRUTI

## 2023-09-14 LAB
A1C WITH ESTIMATED AVERAGE GLUCOSE RESULT: 5.8 % — HIGH (ref 4–5.6)
ALBUMIN SERPL ELPH-MCNC: 3.5 G/DL — SIGNIFICANT CHANGE UP (ref 3.3–5)
ALP SERPL-CCNC: 71 U/L — SIGNIFICANT CHANGE UP (ref 40–120)
ALT FLD-CCNC: 9 U/L — LOW (ref 10–45)
ANION GAP SERPL CALC-SCNC: 12 MMOL/L — SIGNIFICANT CHANGE UP (ref 5–17)
AST SERPL-CCNC: 12 U/L — SIGNIFICANT CHANGE UP (ref 10–40)
BASOPHILS # BLD AUTO: 0.05 K/UL — SIGNIFICANT CHANGE UP (ref 0–0.2)
BASOPHILS NFR BLD AUTO: 0.8 % — SIGNIFICANT CHANGE UP (ref 0–2)
BILIRUB SERPL-MCNC: 0.6 MG/DL — SIGNIFICANT CHANGE UP (ref 0.2–1.2)
BUN SERPL-MCNC: 14 MG/DL — SIGNIFICANT CHANGE UP (ref 7–23)
C DIFF GDH STL QL: NEGATIVE — SIGNIFICANT CHANGE UP
C DIFF GDH STL QL: SIGNIFICANT CHANGE UP
CALCIUM SERPL-MCNC: 9.2 MG/DL — SIGNIFICANT CHANGE UP (ref 8.4–10.5)
CHLORIDE SERPL-SCNC: 107 MMOL/L — SIGNIFICANT CHANGE UP (ref 96–108)
CMV DNA CSF QL NAA+PROBE: ABNORMAL IU/ML
CMV DNA SPEC NAA+PROBE-LOG#: ABNORMAL LOG10IU/ML
CO2 SERPL-SCNC: 20 MMOL/L — LOW (ref 22–31)
CREAT SERPL-MCNC: 1.01 MG/DL — SIGNIFICANT CHANGE UP (ref 0.5–1.3)
CRYPTOC AG FLD QL: NEGATIVE — SIGNIFICANT CHANGE UP
CULTURE RESULTS: NO GROWTH — SIGNIFICANT CHANGE UP
EBV EA AB SER IA-ACNC: <5 U/ML — SIGNIFICANT CHANGE UP
EBV EA AB TITR SER IF: NEGATIVE — SIGNIFICANT CHANGE UP
EBV EA IGG SER-ACNC: NEGATIVE — SIGNIFICANT CHANGE UP
EBV NA IGG SER IA-ACNC: <3 U/ML — SIGNIFICANT CHANGE UP
EBV PATRN SPEC IB-IMP: SIGNIFICANT CHANGE UP
EBV VCA IGG AVIDITY SER QL IA: NEGATIVE — SIGNIFICANT CHANGE UP
EBV VCA IGM SER IA-ACNC: <10 U/ML — SIGNIFICANT CHANGE UP
EBV VCA IGM SER IA-ACNC: <10 U/ML — SIGNIFICANT CHANGE UP
EBV VCA IGM TITR FLD: NEGATIVE — SIGNIFICANT CHANGE UP
EGFR: 83 ML/MIN/1.73M2 — SIGNIFICANT CHANGE UP
EOSINOPHIL # BLD AUTO: 0.24 K/UL — SIGNIFICANT CHANGE UP (ref 0–0.5)
EOSINOPHIL NFR BLD AUTO: 3.8 % — SIGNIFICANT CHANGE UP (ref 0–6)
ESTIMATED AVERAGE GLUCOSE: 120 MG/DL — HIGH (ref 68–114)
GI PCR PANEL: SIGNIFICANT CHANGE UP
GLUCOSE BLDC GLUCOMTR-MCNC: 101 MG/DL — HIGH (ref 70–99)
GLUCOSE BLDC GLUCOMTR-MCNC: 90 MG/DL — SIGNIFICANT CHANGE UP (ref 70–99)
GLUCOSE BLDC GLUCOMTR-MCNC: 94 MG/DL — SIGNIFICANT CHANGE UP (ref 70–99)
GLUCOSE BLDC GLUCOMTR-MCNC: 96 MG/DL — SIGNIFICANT CHANGE UP (ref 70–99)
GLUCOSE SERPL-MCNC: 115 MG/DL — HIGH (ref 70–99)
HCT VFR BLD CALC: 39.1 % — SIGNIFICANT CHANGE UP (ref 39–50)
HGB BLD-MCNC: 12.7 G/DL — LOW (ref 13–17)
IMM GRANULOCYTES NFR BLD AUTO: 0.3 % — SIGNIFICANT CHANGE UP (ref 0–0.9)
LEGIONELLA AG UR QL: NEGATIVE — SIGNIFICANT CHANGE UP
LYMPHOCYTES # BLD AUTO: 1.21 K/UL — SIGNIFICANT CHANGE UP (ref 1–3.3)
LYMPHOCYTES # BLD AUTO: 19.3 % — SIGNIFICANT CHANGE UP (ref 13–44)
MAGNESIUM SERPL-MCNC: 1.5 MG/DL — LOW (ref 1.6–2.6)
MCHC RBC-ENTMCNC: 28.5 PG — SIGNIFICANT CHANGE UP (ref 27–34)
MCHC RBC-ENTMCNC: 32.5 GM/DL — SIGNIFICANT CHANGE UP (ref 32–36)
MCV RBC AUTO: 87.7 FL — SIGNIFICANT CHANGE UP (ref 80–100)
MONOCYTES # BLD AUTO: 0.88 K/UL — SIGNIFICANT CHANGE UP (ref 0–0.9)
MONOCYTES NFR BLD AUTO: 14.1 % — HIGH (ref 2–14)
NEUTROPHILS # BLD AUTO: 3.86 K/UL — SIGNIFICANT CHANGE UP (ref 1.8–7.4)
NEUTROPHILS NFR BLD AUTO: 61.7 % — SIGNIFICANT CHANGE UP (ref 43–77)
NRBC # BLD: 0 /100 WBCS — SIGNIFICANT CHANGE UP (ref 0–0)
PHOSPHATE SERPL-MCNC: 3.6 MG/DL — SIGNIFICANT CHANGE UP (ref 2.5–4.5)
PLATELET # BLD AUTO: 208 K/UL — SIGNIFICANT CHANGE UP (ref 150–400)
POTASSIUM SERPL-MCNC: 4.3 MMOL/L — SIGNIFICANT CHANGE UP (ref 3.5–5.3)
POTASSIUM SERPL-SCNC: 4.3 MMOL/L — SIGNIFICANT CHANGE UP (ref 3.5–5.3)
PROT SERPL-MCNC: 6.2 G/DL — SIGNIFICANT CHANGE UP (ref 6–8.3)
RBC # BLD: 4.46 M/UL — SIGNIFICANT CHANGE UP (ref 4.2–5.8)
RBC # FLD: 14.2 % — SIGNIFICANT CHANGE UP (ref 10.3–14.5)
S PNEUM AG UR QL: NEGATIVE — SIGNIFICANT CHANGE UP
SODIUM SERPL-SCNC: 139 MMOL/L — SIGNIFICANT CHANGE UP (ref 135–145)
SPECIMEN SOURCE: SIGNIFICANT CHANGE UP
T GONDII IGG SER QL: <3 IU/ML — SIGNIFICANT CHANGE UP
T GONDII IGG SER QL: NEGATIVE — SIGNIFICANT CHANGE UP
T GONDII IGM SER QL: <3 AU/ML — SIGNIFICANT CHANGE UP
T GONDII IGM SER QL: NEGATIVE — SIGNIFICANT CHANGE UP
TACROLIMUS SERPL-MCNC: 15.4 NG/ML — SIGNIFICANT CHANGE UP
TACROLIMUS SERPL-MCNC: 9.4 NG/ML — SIGNIFICANT CHANGE UP
WBC # BLD: 6.26 K/UL — SIGNIFICANT CHANGE UP (ref 3.8–10.5)
WBC # FLD AUTO: 6.26 K/UL — SIGNIFICANT CHANGE UP (ref 3.8–10.5)

## 2023-09-14 PROCEDURE — 99233 SBSQ HOSP IP/OBS HIGH 50: CPT

## 2023-09-14 PROCEDURE — 93356 MYOCRD STRAIN IMG SPCKL TRCK: CPT | Mod: 26

## 2023-09-14 PROCEDURE — 93306 TTE W/DOPPLER COMPLETE: CPT | Mod: 26

## 2023-09-14 PROCEDURE — 99233 SBSQ HOSP IP/OBS HIGH 50: CPT | Mod: GC

## 2023-09-14 PROCEDURE — 99232 SBSQ HOSP IP/OBS MODERATE 35: CPT

## 2023-09-14 RX ORDER — MAGNESIUM SULFATE 500 MG/ML
2 VIAL (ML) INJECTION ONCE
Refills: 0 | Status: COMPLETED | OUTPATIENT
Start: 2023-09-14 | End: 2023-09-14

## 2023-09-14 RX ORDER — NIFEDIPINE 30 MG
30 TABLET, EXTENDED RELEASE 24 HR ORAL DAILY
Refills: 0 | Status: DISCONTINUED | OUTPATIENT
Start: 2023-09-14 | End: 2023-09-16

## 2023-09-14 RX ORDER — SODIUM CHLORIDE 9 MG/ML
1000 INJECTION, SOLUTION INTRAVENOUS
Refills: 0 | Status: DISCONTINUED | OUTPATIENT
Start: 2023-09-14 | End: 2023-09-16

## 2023-09-14 RX ORDER — INFLUENZA VIRUS VACCINE 15; 15; 15; 15 UG/.5ML; UG/.5ML; UG/.5ML; UG/.5ML
0.7 SUSPENSION INTRAMUSCULAR ONCE
Refills: 0 | Status: DISCONTINUED | OUTPATIENT
Start: 2023-09-14 | End: 2023-09-16

## 2023-09-14 RX ORDER — TACROLIMUS 5 MG/1
2 CAPSULE ORAL
Refills: 0 | Status: DISCONTINUED | OUTPATIENT
Start: 2023-09-14 | End: 2023-09-15

## 2023-09-14 RX ORDER — TACROLIMUS 5 MG/1
1.5 CAPSULE ORAL
Refills: 0 | Status: DISCONTINUED | OUTPATIENT
Start: 2023-09-14 | End: 2023-09-15

## 2023-09-14 RX ORDER — IPRATROPIUM/ALBUTEROL SULFATE 18-103MCG
3 AEROSOL WITH ADAPTER (GRAM) INHALATION EVERY 6 HOURS
Refills: 0 | Status: DISCONTINUED | OUTPATIENT
Start: 2023-09-14 | End: 2023-09-16

## 2023-09-14 RX ADMIN — Medication 500000 UNIT(S): at 05:10

## 2023-09-14 RX ADMIN — CEFEPIME 100 MILLIGRAM(S): 1 INJECTION, POWDER, FOR SOLUTION INTRAMUSCULAR; INTRAVENOUS at 08:00

## 2023-09-14 RX ADMIN — SODIUM CHLORIDE 100 MILLILITER(S): 9 INJECTION, SOLUTION INTRAVENOUS at 09:50

## 2023-09-14 RX ADMIN — Medication 500000 UNIT(S): at 17:47

## 2023-09-14 RX ADMIN — Medication 1 TABLET(S): at 12:26

## 2023-09-14 RX ADMIN — ATORVASTATIN CALCIUM 20 MILLIGRAM(S): 80 TABLET, FILM COATED ORAL at 22:21

## 2023-09-14 RX ADMIN — ENOXAPARIN SODIUM 40 MILLIGRAM(S): 100 INJECTION SUBCUTANEOUS at 09:15

## 2023-09-14 RX ADMIN — CEFEPIME 100 MILLIGRAM(S): 1 INJECTION, POWDER, FOR SOLUTION INTRAMUSCULAR; INTRAVENOUS at 19:58

## 2023-09-14 RX ADMIN — Medication 10 MILLIGRAM(S): at 19:55

## 2023-09-14 RX ADMIN — TACROLIMUS 2 MILLIGRAM(S): 5 CAPSULE ORAL at 22:21

## 2023-09-14 RX ADMIN — ATOVAQUONE 1500 MILLIGRAM(S): 750 SUSPENSION ORAL at 19:56

## 2023-09-14 RX ADMIN — Medication 10 MILLIGRAM(S): at 05:49

## 2023-09-14 RX ADMIN — Medication 30 MILLIGRAM(S): at 17:47

## 2023-09-14 RX ADMIN — Medication 0: at 08:00

## 2023-09-14 RX ADMIN — Medication 500000 UNIT(S): at 22:24

## 2023-09-14 RX ADMIN — MAGNESIUM OXIDE 400 MG ORAL TABLET 400 MILLIGRAM(S): 241.3 TABLET ORAL at 12:26

## 2023-09-14 RX ADMIN — Medication 81 MILLIGRAM(S): at 12:26

## 2023-09-14 RX ADMIN — MYCOPHENOLATE MOFETIL 500 MILLIGRAM(S): 250 CAPSULE ORAL at 17:47

## 2023-09-14 RX ADMIN — TACROLIMUS 1.5 MILLIGRAM(S): 5 CAPSULE ORAL at 09:49

## 2023-09-14 RX ADMIN — MYCOPHENOLATE MOFETIL 500 MILLIGRAM(S): 250 CAPSULE ORAL at 05:27

## 2023-09-14 RX ADMIN — ATOVAQUONE 1500 MILLIGRAM(S): 750 SUSPENSION ORAL at 05:49

## 2023-09-14 RX ADMIN — Medication 25 GRAM(S): at 17:47

## 2023-09-14 RX ADMIN — SODIUM CHLORIDE 100 MILLILITER(S): 9 INJECTION, SOLUTION INTRAVENOUS at 05:27

## 2023-09-14 NOTE — PROGRESS NOTE ADULT - ASSESSMENT
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       Prior Cardiac Studies  3/15/23 TTE EF 67% LVIDd 4.2cm, LVIDd 4.2cm, normal LVSF, normal RV function with decreased RVSF, mild TR, no pericardial effusion. Compared to TTE 1/4/23, RV size has improved, there is no change in RV function.  ?1/4/23 TTE EF 64% LVIDd 4.5cm, normal LVSF, Global strain -20%, decreased RV systolic function, no valvular abnormalities, no pericardial effusion. Copmared to TTE 11/30/22, no significant changes noted.  ?11/30/22 TTE EF 58% LVIDd 2.6, normal LFSV, decreased RV systolic function, RV wall strain -12.1%, no valvular abnormalities, no pericardial effusion  ?10/20/22 TTE limited: normal graft function. No pericardial effusion seen.  ?TTE 8/2/22: limited study in the setting of an RV biopsy  1. Normal right ventricular size and function.  2. Small pericardiall effusion.  ?A bioptome is noted in the right heart. No change to the size of the pericardial effusion or RV function  ?7/20/22: TTE: limited study: hyperdynamic LVSF, normal RV size and function, normal pericardium with trace to small pericardial effusion adjacent to RV  ?07/05/22 TTE: limited study: normal biV function, small loculated pericardial effusion at the LV apex, measuring approx 1.0 cm seen both pre and post images.      Cardiac Cath/PCI:  ?  6/28: RA 9, RV 3/11, PA 62/27/41, wedge 21 with v wave 31, PA sat 69.3%, /81/102, CO/Ci 6.87/3.62, grade 1R/2  7/5: RA 2, RV 35/3, PA 36/18/26, Wedge 15, PA sat 71.7, Kristy CO/CI 5.4/2.8, /75/93, SVR 1345 dsc, 1R/1A  7/13: RA 3, PAP 36/14/24 PCW 8, Pa sat 71%, Kristy CO/CI 6.1/3.2, EMBx ISHLT Grade 1R/1A  7/20: RA 3 PAP 47/20/30 PCW 15 PaSat 67% Kristy CO/CI 5.9/3.2 EMBx ISHLT Grade 1R/1A  8/2: RA 4, PAP 35/16/25 PCW 12 Pasat 68% Kristy CO/CI 5.3/2.9, EMBx ISHLT Grade 1R/1A  8/16: RA 5, PAP 42/19/27 PCW 15 Pasat 72% Kristy CO/CI 5.6/3.0, EMBx ISHLT Grade 0R  9/22: RA 5 PAP 41/18/28 PCW 16 PaSat 75% Kristy CO/CI 5.8/3.1, EMBx ISHLT Grade 1R/1A  10/20: RA 10/20/22 RA 2 RV 32/2 PA 33/9/21 PCWP 9 Kristy CO/CI 6.09/3.32 ACR 1R/1A, pAMR 0  6/28/23: RHC w/ EMBx, LHC w/ IVUS official report pending, per verbal report normal hemos and normal cors. EMBx = ISHLT Grade 2R/3A.

## 2023-09-14 NOTE — ED ADULT NURSE REASSESSMENT NOTE - NS ED NURSE REASSESS COMMENT FT1
Received patient from RN Sachi, patient at baseline mental status, able to make needs known, NAD, VSS, patient agreeable to plan of care, pending bed assignment, comfort and safety provided. Pt ambulated to restroom w/out any difficulty,

## 2023-09-14 NOTE — PROGRESS NOTE ADULT - ASSESSMENT
67M with history of DM, CKD stage 3, mixed NICM/ICM s/p PPM upgraded to CRT-D, CAD s/p PCI to mLAD in 4/2022, and VT now s/p OHT on 6/21/22 (CMV +/+, toxo -/-), recent biopsy c/f transplant reject 7/2023 sent to ED from HF clinic due to fever iso immunosuppression, admitted to medicine for infectious work-up.

## 2023-09-14 NOTE — PROGRESS NOTE ADULT - PROBLEM SELECTOR PLAN 1
RVP positive for coronavirus, not covid. Now having diarrhea again.     Recommendations:  - F/up Blood Cultures. UA negative. CXR clear lung fields  - Legionella UAg, Strep UAg: neg  - Obtain GI PCR/Cdiff PCR  - follow up CMV PCR, Crypt Ag, Toxo Ab/PCR, Aspergillus, Fungitell, EBV PCR  - TxID consult.

## 2023-09-14 NOTE — PROGRESS NOTE ADULT - PROBLEM SELECTOR PLAN 2
- non COVID corona virus positive  - mostly upper airway symptoms, clear lungs  - SpO2 appropriately on RA  - PRN inhaler

## 2023-09-14 NOTE — PROGRESS NOTE ADULT - PROBLEM SELECTOR PLAN 2
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 Global longitudinal strain is -17.6 %  -Repeat TTE.

## 2023-09-14 NOTE — PROGRESS NOTE ADULT - ATTENDING COMMENTS
Feels much better. No further fevers.   Received 1 dose cefepime for ?dysuria.   +RVP.  TxID following pending   CMV pending.

## 2023-09-14 NOTE — PATIENT PROFILE ADULT - CHOOSE INDICATION TO IMMUNIZE (AN ORDER WILL BE GENERATED WHEN THIS NOTE IS SAVED):
Bedside report received from night RN, pt care assumed, assessment completed. Pt is A&O4, pain 0, afib on the monitor. Updated on POC, questions answered. Bed in lowest, locked position, treaded socks on, call light and belongings within reach.      Patient is not pregnant (male or female)

## 2023-09-14 NOTE — PROGRESS NOTE ADULT - ASSESSMENT
66M with ACC/AHA Stage D mixed NICM/ICM s/p PPM upgraded to CRT-D, CAD s/p PCI to mLAD 4/2022, eventually s/p OHT on 6/21/22 (CMV +/+, Toxo -/-), DM, CKD, sent in to ED (9/13/23) for fever 100.5F at home with dry cough and malaise, Transplant ID consulted for assistance.    Patient reports developing dry cough and fever for the past 2 days  Reports some mild dysuria and flank pain  Has been also having intermittent bouts of diarrhea for 2 months, started after rejection in 6/2023, complicated by diarrhea 7/2023, tested positive for Norovirus, treated conservatively with fluids and watching monitoring  Repeat testing in 9/2023 outpatient showed GI PCR and Cdiff negative  Denies any recent sick contact  Denies recent travel  Reports COVID vaccinated only x2, primary vaccination without booster    DIAGNOSIS and IMPRESSION:  #Viral Infection Coronavirus (not SARS-COV-2)  #Fever, Loose stools  #Mixed NICM/ICM s/p OHT on 6/21/22 (CMV +/+, Toxo -/-)    - symptoms likely from Viral Infection 2/2 Coronavirus, RVP positive for Coronavirus, not SARS-COV-2  - dry cough improving, CXR clear, Strep UAg negative, doubt bacterial pneumonia  - dysuria resolved, UA/UCx negative, doubt UTI/Pyelonephritis  - diarrhea starting again, already had 3 episodes this morning  - hemodynamically stable, saturating well on RA    RECOMMENDATIONS:  - continue cefepime for now until BCx results  - supportive care for  Coronavirus, not SARS-COV-2  - monitor temperature curve  - trend WBC  - obtain GI PCR (patient having diarrhea again)  - follow up BCx x2  - follow up CMV PCR, Crypt Ag, Toxo Ab/PCR, Aspergillus, Fungitell, EBV PCR    #Prophylaxis  - CMV +/+: completed 6 months of valcyte. CMV PCR < 34 (9/5/23), follow up CMV PCR  - Toxo -/-: no ppx needed  - PJP: continuemepron 1500mg daily (bactrim was not trialed due to hyperkalemia and borderline elevated SCr)      Above recommendations are Preliminary until Attending's Addendum which includes Final Recommendations      Wilder Quintanilla DO, PGY-5   ID fellow  [employer] Teams Preferred  After 5pm/weekends call [phone] 66M with ACC/AHA Stage D mixed NICM/ICM s/p PPM upgraded to CRT-D, CAD s/p PCI to mLAD 4/2022, eventually s/p OHT on 6/21/22 (CMV +/+, Toxo -/-), DM, CKD, sent in to ED (9/13/23) for fever 100.5F at home with dry cough and malaise, Transplant ID consulted for assistance.    Patient reports developing dry cough and fever for the past 2 days  Reports some mild dysuria and flank pain  Has been also having intermittent bouts of diarrhea for 2 months, started after rejection in 6/2023, complicated by diarrhea 7/2023, tested positive for Norovirus, treated conservatively with fluids and watching monitoring  Repeat testing in 9/2023 outpatient showed GI PCR and Cdiff negative  Denies any recent sick contact  Denies recent travel  Reports COVID vaccinated only x2, primary vaccination without booster    ANTIBIOTIC:  cefepime (9/13 -->)    DIAGNOSIS and IMPRESSION:  #Viral Infection Coronavirus (not SARS-COV-2)  #Fever, Loose stools  #Mixed NICM/ICM s/p OHT on 6/21/22 (CMV +/+, Toxo -/-)    - symptoms likely from Viral Infection 2/2 Coronavirus, RVP positive for Coronavirus, not SARS-COV-2  - dry cough improving, CXR clear, Strep UAg negative, doubt bacterial pneumonia  - dysuria resolved, UA negative, low suspicion for UTI/Pyelonephritis  - diarrhea starting again, already had 3 episodes this morning  - hemodynamically stable, saturating well on RA    RECOMMENDATIONS:  - continue cefepime for now until UCx results  - supportive care for Coronavirus, not SARS-COV-2  - monitor temperature curve  - trend WBC  - obtain GI PCR and Cdiff (patient having diarrhea again)  - follow up BCx x2, UCx  - follow up CMV PCR, Crypt Ag, Toxo Ab/PCR, Aspergillus, Fungitell, EBV PCR    #Prophylaxis  - CMV +/+: completed 6 months of valcyte. CMV PCR < 34 (9/5/23), follow up CMV PCR  - Toxo -/-: no ppx needed  - PJP: continue mepron 1500mg daily (bactrim was not trialed due to hyperkalemia and borderline elevated SCr)    Case d/w attending and primary team      Wilder Quintanilla, DO, PGY-5   ID fellow  [employer] Preferred  After 5pm/weekends call [phone] 66M with ACC/AHA Stage D mixed NICM/ICM s/p PPM upgraded to CRT-D, CAD s/p PCI to mLAD 4/2022, eventually s/p OHT on 6/21/22 (CMV +/+, Toxo -/-), DM, CKD, sent in to ED (9/13/23) for fever 100.5F at home with dry cough and malaise, Transplant ID consulted for assistance.    Patient reports developing dry cough and fever for the past 2 days  Reports some mild dysuria and flank pain  Has been also having intermittent bouts of diarrhea for 2 months, started after rejection in 6/2023, complicated by diarrhea 7/2023, tested positive for Norovirus, treated conservatively with fluids and watching monitoring  Repeat testing in 9/2023 outpatient showed GI PCR and Cdiff negative  Denies any recent sick contact  Denies recent travel  Reports COVID vaccinated only x2, primary vaccination without booster    ANTIBIOTIC:  cefepime (9/13 -->)    DIAGNOSIS and IMPRESSION:  #Viral Infection Coronavirus (not SARS-COV-2)  #Fever, Loose stools  #Mixed NICM/ICM s/p OHT on 6/21/22 (CMV +/+, Toxo -/-)    - symptoms likely from Viral Infection 2/2 Coronavirus, RVP positive for Coronavirus, not SARS-COV-2  - dry cough improving, CXR clear, Strep UAg negative, doubt bacterial pneumonia  - dysuria resolved, UA grossly negative, but pending UCx, though low suspicion for UTI/Pyelonephritis  - diarrhea starting again, already had 3 episodes this morning  - hemodynamically stable, saturating well on RA    RECOMMENDATIONS:  - continue cefepime for now until UCx results  - supportive care for Coronavirus, not SARS-COV-2  - monitor temperature curve  - trend WBC  - obtain GI PCR and Cdiff (patient having diarrhea again)  - follow up BCx x2, UCx  - follow up CMV PCR, Crypt Ag, Toxo Ab/PCR, Aspergillus, Fungitell, EBV PCR    #Prophylaxis  - CMV +/+: completed 6 months of valcyte. CMV PCR < 34 (9/5/23), follow up CMV PCR  - Toxo -/-: no ppx needed  - PJP: continue mepron 1500mg daily (bactrim was not trialed due to hyperkalemia and borderline elevated SCr)    Case d/w attending and primary team      Wilder Quintanilla DO, PGY-5   ID fellow  [employer] Preferred  After 5pm/weekends call [phone] 66M with ACC/AHA Stage D mixed NICM/ICM s/p PPM upgraded to CRT-D, CAD s/p PCI to mLAD 4/2022, eventually s/p OHT on 6/21/22 (CMV +/+, Toxo -/-), DM, CKD, sent in to ED (9/13/23) for fever 100.5F at home with dry cough and malaise, Transplant ID consulted for assistance.    Patient reports developing dry cough and fever for the past 2 days  Reports some mild dysuria and flank pain  Has been also having intermittent bouts of diarrhea for 2 months, started after rejection in 6/2023, complicated by diarrhea 7/2023, tested positive for Norovirus, treated conservatively with fluids and watching monitoring  Repeat testing in 9/2023 outpatient showed GI PCR and Cdiff negative  Denies any recent sick contact  Denies recent travel  Reports COVID vaccinated only x2, primary vaccination without booster    ANTIBIOTIC:  cefepime (9/13 -->)    DIAGNOSIS and IMPRESSION:  #Viral Infection Coronavirus (not SARS-COV-2)  #Fever, Loose stools  #Mixed NICM/ICM s/p OHT on 6/21/22 (CMV +/+, Toxo -/-)    - symptoms likely from Viral Infection 2/2 Coronavirus, RVP positive for Coronavirus, not SARS-COV-2  - dry cough improving, CXR clear, Strep UAg negative, doubt bacterial pneumonia  - dysuria resolved, UA grossly negative, but pending UCx, though low suspicion for UTI/Pyelonephritis  - diarrhea starting again, already had 3 episodes this morning  - hemodynamically stable, saturating well on RA    RECOMMENDATIONS:  - continue cefepime for now until UCx results  - supportive care for Coronavirus, not SARS-COV-2  - monitor temperature curve  - trend WBC  - obtain GI PCR and Cdiff (patient having diarrhea again)  - follow up BCx x2, UCx  - follow up CMV PCR, Crypt Ag, Toxo Ab/PCR, Aspergillus, Fungitell, EBV PCR    #Prophylaxis  - CMV +/+: completed 6 months of valcyte. CMV PCR < 34 (9/5/23), follow up CMV PCR  - Toxo -/-: no ppx needed  - PJP: - sill on mepron 1500mg daily (bactrim was not trialed due to hyperkalemia and borderline elevated SCr). Having recievd now >8 weeks since steroid treatment for rejection can discontinue at this time.     Case d/w attending and primary team      Wilder Quintanilla DO, PGY-5   ID fellow  [employer] Teams Preferred  After 5pm/weekends call [phone]

## 2023-09-14 NOTE — PROGRESS NOTE ADULT - PROBLEM SELECTOR PLAN 3
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

## 2023-09-14 NOTE — PROGRESS NOTE ADULT - ATTENDING COMMENTS
66M with ACC/AHA Stage D mixed NICM/ICM s/p PPM upgraded to CRT-D, CAD s/p PCI to mLAD 4/2022, eventually s/p OHT on 6/21/22 (CMV +/+, Toxo -/-), course most recently complicated with rejection treated with pulse dose steroids 6.2023, then on higher cellcept ./tacr,  norovirus in July with now resolved diarrhea , sent to ER with fever, cough , sore throat, myalgias for few days as well as new symptoms of dysuria. recently completed short course of valcyte ppx  RVP + for non COVID 19 coronavirus, CXR clear, CMV PCR <34 on 9/13  UA (non micro) with no LE, or nitrites and dysuria resolved, UC pending  Follow UC will result later today. if negative, d/c antibiotics and ok to discharge with supportive care        Thank you for involving us in the care of this patient  Transplant ID will continue to follow  Please call or page with additional questions  Pager; #[phone]  Teams: from 8 am to 5 pm  Keke Escamilla MD 66M with ACC/AHA Stage D mixed NICM/ICM s/p PPM upgraded to CRT-D, CAD s/p PCI to mLAD 4/2022, eventually s/p OHT on 6/21/22 (CMV +/+, Toxo -/-), course most recently complicated with rejection treated with pulse dose steroids 6.2023, then on higher cellcept ./tacr,  norovirus in July with now resolved diarrhea , sent to ER with fever, cough , sore throat, myalgias for few days as well as new symptoms of dysuria. recently completed short course of valcyte ppx  RVP + for non COVID 19 coronavirus, CXR clear, CMV PCR <34 on 9/13; Gi PCR neg  UA (non micro) with no LE, or nitrites and dysuria resolved, UC pending  Follow UC will result later today. if negative, d/c antibiotics and ok to discharge with supportive care          Thank you for involving us in the care of this patient  Transplant ID will continue to follow  Please call or page with additional questions  Pager; #[phone]  Teams: from 8 am to 5 pm  Keke Escamilla MD

## 2023-09-14 NOTE — PROGRESS NOTE ADULT - PROBLEM SELECTOR PLAN 1
a/w fever, meeting SIRS criteria with leukocytosis & tachycardia - now improving. Endorsing cough and congestion  - found to be RVP + coronavirus (non-COVID)  - UA negative  - CXR no focal consolidation  - symptoms likely 2/2 viral illness, but given immunosuppression, will await full infectious work-up  - transplant ID following     Plan:  - per Tx ID - c/w cefepime for now pending Ucx    - strep negative, legionella pending   - Blood and Ucx pending   - f/u CMV PCR, Crypt Ag, Toxo Ab/PCR, Aspergillus, Fungitell, EBV PCR  - will send GI PCR/c.diff

## 2023-09-14 NOTE — PROGRESS NOTE ADULT - PROBLEM SELECTOR PLAN 3
-Tacro 1.5mg am, 2mg pM trough goal 8-10  -Decrease Cellcept to 500mg BID  -Consult for PharmD assistance.

## 2023-09-14 NOTE — PATIENT PROFILE ADULT - FUNCTIONAL ASSESSMENT - BASIC [employer] 6.
4-calculated by average/Not able to assess (calculate score using [facility] averaging method)

## 2023-09-14 NOTE — PROGRESS NOTE ADULT - PROBLEM SELECTOR PLAN 4
continue Mepron 1500mg BID. Will consider switching to Bactrim prior to DC.   Had h/o hyperK and JAGRUTI.

## 2023-09-15 DIAGNOSIS — R19.7 DIARRHEA, UNSPECIFIED: ICD-10-CM

## 2023-09-15 DIAGNOSIS — B25.9 CYTOMEGALOVIRAL DISEASE, UNSPECIFIED: ICD-10-CM

## 2023-09-15 LAB
1,3 BETA GLUCAN SER QL: NEGATIVE — SIGNIFICANT CHANGE UP
ANION GAP SERPL CALC-SCNC: 12 MMOL/L — SIGNIFICANT CHANGE UP (ref 5–17)
BUN SERPL-MCNC: 13 MG/DL — SIGNIFICANT CHANGE UP (ref 7–23)
CALCIUM SERPL-MCNC: 9.3 MG/DL — SIGNIFICANT CHANGE UP (ref 8.4–10.5)
CHLORIDE SERPL-SCNC: 104 MMOL/L — SIGNIFICANT CHANGE UP (ref 96–108)
CO2 SERPL-SCNC: 22 MMOL/L — SIGNIFICANT CHANGE UP (ref 22–31)
CREAT SERPL-MCNC: 0.95 MG/DL — SIGNIFICANT CHANGE UP (ref 0.5–1.3)
EBV DNA SERPL NAA+PROBE-ACNC: SIGNIFICANT CHANGE UP IU/ML
EBVPCR LOG: SIGNIFICANT CHANGE UP LOG10IU/ML
EGFR: 89 ML/MIN/1.73M2 — SIGNIFICANT CHANGE UP
FUNGITELL: <31 PG/ML — SIGNIFICANT CHANGE UP
GLUCOSE BLDC GLUCOMTR-MCNC: 90 MG/DL — SIGNIFICANT CHANGE UP (ref 70–99)
GLUCOSE BLDC GLUCOMTR-MCNC: 93 MG/DL — SIGNIFICANT CHANGE UP (ref 70–99)
GLUCOSE BLDC GLUCOMTR-MCNC: 94 MG/DL — SIGNIFICANT CHANGE UP (ref 70–99)
GLUCOSE BLDC GLUCOMTR-MCNC: 98 MG/DL — SIGNIFICANT CHANGE UP (ref 70–99)
GLUCOSE SERPL-MCNC: 104 MG/DL — HIGH (ref 70–99)
HCT VFR BLD CALC: 41.1 % — SIGNIFICANT CHANGE UP (ref 39–50)
HGB BLD-MCNC: 13.6 G/DL — SIGNIFICANT CHANGE UP (ref 13–17)
MAGNESIUM SERPL-MCNC: 1.8 MG/DL — SIGNIFICANT CHANGE UP (ref 1.6–2.6)
MCHC RBC-ENTMCNC: 28.6 PG — SIGNIFICANT CHANGE UP (ref 27–34)
MCHC RBC-ENTMCNC: 33.1 GM/DL — SIGNIFICANT CHANGE UP (ref 32–36)
MCV RBC AUTO: 86.5 FL — SIGNIFICANT CHANGE UP (ref 80–100)
NRBC # BLD: 0 /100 WBCS — SIGNIFICANT CHANGE UP (ref 0–0)
PLATELET # BLD AUTO: 222 K/UL — SIGNIFICANT CHANGE UP (ref 150–400)
POTASSIUM SERPL-MCNC: 4.1 MMOL/L — SIGNIFICANT CHANGE UP (ref 3.5–5.3)
POTASSIUM SERPL-SCNC: 4.1 MMOL/L — SIGNIFICANT CHANGE UP (ref 3.5–5.3)
RBC # BLD: 4.75 M/UL — SIGNIFICANT CHANGE UP (ref 4.2–5.8)
RBC # FLD: 13.7 % — SIGNIFICANT CHANGE UP (ref 10.3–14.5)
SODIUM SERPL-SCNC: 138 MMOL/L — SIGNIFICANT CHANGE UP (ref 135–145)
T GONDII DNA BLD QL NAA+PROBE: SIGNIFICANT CHANGE UP COPIES/ML
TACROLIMUS SERPL-MCNC: 12.2 NG/ML — SIGNIFICANT CHANGE UP
TOXOPLASMA BY RT-PCR, WHOLE BLOOD RESULT: SIGNIFICANT CHANGE UP COPIES/ML
WBC # BLD: 6.92 K/UL — SIGNIFICANT CHANGE UP (ref 3.8–10.5)
WBC # FLD AUTO: 6.92 K/UL — SIGNIFICANT CHANGE UP (ref 3.8–10.5)

## 2023-09-15 PROCEDURE — 99233 SBSQ HOSP IP/OBS HIGH 50: CPT

## 2023-09-15 PROCEDURE — 99233 SBSQ HOSP IP/OBS HIGH 50: CPT | Mod: GC

## 2023-09-15 PROCEDURE — 99232 SBSQ HOSP IP/OBS MODERATE 35: CPT

## 2023-09-15 RX ORDER — TACROLIMUS 5 MG/1
1.5 CAPSULE ORAL
Refills: 0 | Status: DISCONTINUED | OUTPATIENT
Start: 2023-09-15 | End: 2023-09-16

## 2023-09-15 RX ORDER — MYCOPHENOLATE MOFETIL 250 MG/1
1000 CAPSULE ORAL EVERY 12 HOURS
Refills: 0 | Status: DISCONTINUED | OUTPATIENT
Start: 2023-09-15 | End: 2023-09-16

## 2023-09-15 RX ADMIN — ENOXAPARIN SODIUM 40 MILLIGRAM(S): 100 INJECTION SUBCUTANEOUS at 09:55

## 2023-09-15 RX ADMIN — TACROLIMUS 1.5 MILLIGRAM(S): 5 CAPSULE ORAL at 21:44

## 2023-09-15 RX ADMIN — Medication 500000 UNIT(S): at 05:23

## 2023-09-15 RX ADMIN — Medication 10 MILLIGRAM(S): at 17:32

## 2023-09-15 RX ADMIN — MAGNESIUM OXIDE 400 MG ORAL TABLET 400 MILLIGRAM(S): 241.3 TABLET ORAL at 12:19

## 2023-09-15 RX ADMIN — Medication 500000 UNIT(S): at 12:19

## 2023-09-15 RX ADMIN — ATORVASTATIN CALCIUM 20 MILLIGRAM(S): 80 TABLET, FILM COATED ORAL at 21:45

## 2023-09-15 RX ADMIN — MYCOPHENOLATE MOFETIL 1000 MILLIGRAM(S): 250 CAPSULE ORAL at 21:44

## 2023-09-15 RX ADMIN — MYCOPHENOLATE MOFETIL 500 MILLIGRAM(S): 250 CAPSULE ORAL at 05:24

## 2023-09-15 RX ADMIN — ATOVAQUONE 1500 MILLIGRAM(S): 750 SUSPENSION ORAL at 05:23

## 2023-09-15 RX ADMIN — Medication 10 MILLIGRAM(S): at 05:23

## 2023-09-15 RX ADMIN — Medication 81 MILLIGRAM(S): at 12:19

## 2023-09-15 RX ADMIN — ATOVAQUONE 1500 MILLIGRAM(S): 750 SUSPENSION ORAL at 17:32

## 2023-09-15 RX ADMIN — Medication 1 TABLET(S): at 12:19

## 2023-09-15 RX ADMIN — TACROLIMUS 1.5 MILLIGRAM(S): 5 CAPSULE ORAL at 09:56

## 2023-09-15 RX ADMIN — CEFEPIME 100 MILLIGRAM(S): 1 INJECTION, POWDER, FOR SOLUTION INTRAMUSCULAR; INTRAVENOUS at 05:23

## 2023-09-15 NOTE — DIETITIAN INITIAL EVALUATION ADULT - ORAL INTAKE PTA/DIET HISTORY
Pt reports good PO intake in general however unable to gain wt due to having frequent stools for the past 3 months (since June 2023).  Pt reports practicing food safety handling since his heart surgery in June 2022. Reports not using salt and avoiding refined carbohydrate in his diet.   Confirms NKFA/intolerance  Micronutrient/Other supplementation: none  Protein-energy supplementation: none

## 2023-09-15 NOTE — PROGRESS NOTE ADULT - PROBLEM/PLAN-6
Left message on voice mail for patient to return our call to our office.    Provided contact information and availability on voicemail.    
DISPLAY PLAN FREE TEXT

## 2023-09-15 NOTE — DIETITIAN INITIAL EVALUATION ADULT - OTHER CALCULATIONS
Fluid needs deferred to team. Energy and protein needs based on current wt of 133.6lb in consideration of malnutrition.

## 2023-09-15 NOTE — DIETITIAN NUTRITION RISK NOTIFICATION - TREATMENT: THE FOLLOWING DIET HAS BEEN RECOMMENDED
Diet, Consistent Carbohydrate/No Snacks:   Banatrol TF     Qty per Day:  1  Supplement Feeding Modality:  Oral  Glucerna Shake Cans or Servings Per Day:  1       Frequency:  Daily (09-15-23 @ 17:00) [Pending Verification By Attending]  Diet, Consistent Carbohydrate/No Snacks (09-14-23 @ 15:15) [Active]

## 2023-09-15 NOTE — PROGRESS NOTE ADULT - PROBLEM SELECTOR PROBLEM 5
Chronic kidney disease, unspecified CKD stage
Need for prophylactic measure
Steroid-induced hyperglycemia
Steroid-induced hyperglycemia

## 2023-09-15 NOTE — PROGRESS NOTE ADULT - PROBLEM SELECTOR PLAN 5
continue Mepron 1500mg BID. Will consider switching to Bactrim prior to DC.   Had h/o hyperK and JAGRUTI. OK to stop Mepron.   Had h/o hyperK and JAGRUTI.

## 2023-09-15 NOTE — DIETITIAN INITIAL EVALUATION ADULT - ORAL NUTRITION SUPPLEMENTS
recommend Glucerna Shake 1x daily (220kcals, 10g protein) and Banatrol Plus 1 packet daily to help with frequent BM and to provide additional calories and nutrients while in house.

## 2023-09-15 NOTE — PROGRESS NOTE ADULT - PROBLEM SELECTOR PLAN 1
- symptoms likely 2/2 viral illness, but given immunosuppression  - found to be RVP + coronavirus (non-COVID19)  - diarrhea- c diff negative and pcr negative-- GI consult pending (per daniela haynes for dc if cleared by GI)   - UA negative  - CXR no focal consolidation  - bcx and ucx ngtd  - transplant ID following-- okay to dc cefepime  - f/u CMV PCR, Crypt Ag, Toxo Ab/PCR, Aspergillus, Fungitell, EBV PCR

## 2023-09-15 NOTE — PROGRESS NOTE ADULT - NSPROGADDITIONALINFOA_GEN_ALL_CORE
disposition: DC pending GI consult/ clearance- either 9/15 or 9/16- home   discussed with dr dolan on 9/15    Farida Almaraz D.O.  Division of Hospital Medicine  Available on [employer] Teams

## 2023-09-15 NOTE — PROGRESS NOTE ADULT - PROBLEM SELECTOR PLAN 7
- DVT ppx: lovenox
- DVT ppx: lovenox  - diet: DASH  - dispo: pending ID and HF clearance
Cont. home nifedipine.

## 2023-09-15 NOTE — PROGRESS NOTE ADULT - PROBLEM SELECTOR PLAN 6
BP stable, within goal   - c/w nifedipine
Create baseline. CTM
Cont. home nifedipine.
BP stable, within goal   - home nifedipine 30mg held on admission 2/2 soft BPs --> BP better now, will resume with hold parameters

## 2023-09-15 NOTE — PROGRESS NOTE ADULT - ASSESSMENT
66M with ACC/AHA Stage D mixed NICM/ICM s/p PPM upgraded to CRT-D, CAD s/p PCI to mLAD 4/2022, eventually s/p OHT on 6/21/22 (CMV +/+, Toxo -/-), DM, CKD, sent in to ED (9/13/23) for fever 100.5F at home with dry cough and malaise, Transplant ID consulted for assistance.    Patient reports developing dry cough and fever for the past 2 days  Reports some mild dysuria and flank pain  Has been also having intermittent bouts of diarrhea for 2 months, started after rejection in 6/2023, complicated by diarrhea 7/2023, tested positive for Norovirus, treated conservatively with fluids and watching monitoring  Repeat testing in 9/2023 outpatient showed GI PCR and Cdiff negative  Denies any recent sick contact  Denies recent travel  Reports COVID vaccinated only x2, primary vaccination without booster    ANTIBIOTIC:  cefepime (9/13 -->)    DIAGNOSIS and IMPRESSION:  #Viral Infection Coronavirus (not SARS-COV-2)  #Fever, Loose stools  #Mixed NICM/ICM s/p OHT on 6/21/22 (CMV +/+, Toxo -/-)    - symptoms likely from Viral Infection 2/2 Coronavirus, RVP positive for Coronavirus, not SARS-COV-2  - dry cough improving, CXR clear, Strep UAg negative, Legionella negative, Blood culture NGTD, doubt bacterial pneumonia  - dysuria resolved, UA grossly negative/UCx no growth, low suspicion for UTI/Pyelonephritis  - loose stools, GI PCR and Cdiff negative  - hemodynamically stable, saturating well on RA    RECOMMENDATIONS:  - discontinue cefepime  - supportive care for Coronavirus, not SARS-COV-2  - monitor temperature curve  - trend WBC  - Likely clear for discharge home with supportive care    #Prophylaxis  - CMV +/+: completed 6 months of valcyte. CMV PCR < 34 (9/5/23), follow up CMV PCR  - Toxo -/-: no ppx needed  - PJP: - sill on mepron 1500mg daily (bactrim was not trialed due to hyperkalemia and borderline elevated SCr). Having recievd now >8 weeks since steroid treatment for rejection can discontinue at this time.     Above recommendations are Preliminary until Attending's Addendum which includes Final Recommendations      Wilder Quintanilla DO, PGY-5   ID fellow  [employer] Teams Preferred  After 5pm/weekends call [phone] 66M with ACC/AHA Stage D mixed NICM/ICM s/p PPM upgraded to CRT-D, CAD s/p PCI to mLAD 4/2022, eventually s/p OHT on 6/21/22 (CMV +/+, Toxo -/-), DM, CKD, sent in to ED (9/13/23) for fever 100.5F at home with dry cough and malaise, Transplant ID consulted for assistance.    Patient reports developing dry cough and fever for the past 2 days  Reports some mild dysuria and flank pain  Has been also having intermittent bouts of diarrhea for 2 months, started after rejection in 6/2023, complicated by diarrhea 7/2023, tested positive for Norovirus, treated conservatively with fluids and watching monitoring  Repeat testing in 9/2023 outpatient showed GI PCR and Cdiff negative  Denies any recent sick contact  Denies recent travel  Reports COVID vaccinated only x2, primary vaccination without booster    ANTIBIOTIC:  cefepime (9/13 -->)    DIAGNOSIS and IMPRESSION:  #Viral Infection Coronavirus (not SARS-COV-2)  #Fever, Loose stools  #Mixed NICM/ICM s/p OHT on 6/21/22 (CMV +/+, Toxo -/-)    - symptoms likely from Viral Infection 2/2 Coronavirus, RVP positive for Coronavirus, not SARS-COV-2  - dry cough improving, CXR clear, Strep UAg negative, Legionella negative, Blood culture NGTD, doubt bacterial pneumonia  - dysuria resolved, UA grossly negative/UCx no growth, low suspicion for UTI/Pyelonephritis  - loose stools, GI PCR and Cdiff negative  - hemodynamically stable, saturating well on RA    RECOMMENDATIONS:  - discontinue cefepime  - supportive care for Coronavirus, not SARS-COV-2  - monitor temperature curve  - trend WBC  - no opposition for discharge home on Infectious Disease standpoint, continue with supportive care for viral infection    #Prophylaxis  - CMV +/+: completed 6 months of valcyte. CMV PCR < 34 (9/5/23), follow up CMV PCR  - Toxo -/-: no ppx needed  - PJP: - sill on mepron 1500mg daily, Having recievd now >8 weeks since steroid treatment for rejection can discontinue at this time    Case d/w attending and primary team      Wilder Quintanilla DO, PGY-5   ID fellow  [employer] Teams Preferred  After 5pm/weekends call [phone] 66M with ACC/AHA Stage D mixed NICM/ICM s/p PPM upgraded to CRT-D, CAD s/p PCI to mLAD 4/2022, eventually s/p OHT on 6/21/22 (CMV +/+, Toxo -/-), DM, CKD, sent in to ED (9/13/23) for fever 100.5F at home with dry cough and malaise, Transplant ID consulted for assistance.    Patient reports developing dry cough and fever for the past 2 days  Reports some mild dysuria and flank pain  Has been also having intermittent bouts of diarrhea for 2 months, started after rejection in 6/2023, complicated by diarrhea 7/2023, tested positive for Norovirus, treated conservatively with fluids and watching monitoring  Repeat testing in 9/2023 outpatient showed GI PCR and Cdiff negative  Denies any recent sick contact  Denies recent travel  Reports COVID vaccinated only x2, primary vaccination without booster    ANTIBIOTIC:  cefepime (9/13 -->)    DIAGNOSIS and IMPRESSION:  #Viral Infection Coronavirus (not SARS-COV-2)  #Fever, Loose stools  #Mixed NICM/ICM s/p OHT on 6/21/22 (CMV +/+, Toxo -/-)    - symptoms likely from Viral Infection 2/2 Coronavirus, RVP positive for Coronavirus, not SARS-COV-2  - dry cough improving, CXR clear, Strep UAg negative, Legionella negative, Blood culture NGTD, doubt bacterial pneumonia  - dysuria resolved, UA grossly negative/UCx no growth, low suspicion for UTI/Pyelonephritis  - loose stools, GI PCR and Cdiff negative  - hemodynamically stable, saturating well on RA    RECOMMENDATIONS:  - discontinue cefepime  - supportive care for Coronavirus, not SARS-COV-2  - monitor temperature curve  - trend WBC  - as for chronic intermittent diarrhea, obtain Stool O&P and Microsporidia smear (can follow up results outpatient)  - no opposition for discharge home on Infectious Disease standpoint, continue with supportive care for viral infection    #Prophylaxis  - CMV +/+: completed 6 months of valcyte. CMV PCR < 34 (9/5/23), follow up CMV PCR  - Toxo -/-: no ppx needed  - PJP: - sill on mepron 1500mg daily, Having recievd now >8 weeks since steroid treatment for rejection can discontinue at this time    Case d/w attending and primary team      Wilder Quintanilla DO, PGY-5   ID fellow  [employer] Preferred  After 5pm/weekends call [phone] 66M with ACC/AHA Stage D mixed NICM/ICM s/p PPM upgraded to CRT-D, CAD s/p PCI to mLAD 4/2022, eventually s/p OHT on 6/21/22 (CMV +/+, Toxo -/-), DM, CKD, sent in to ED (9/13/23) for fever 100.5F at home with dry cough and malaise, Transplant ID consulted for assistance.    Patient reports developing dry cough and fever for the past 2 days  Reports some mild dysuria and flank pain  Has been also having intermittent bouts of diarrhea for 2 months, started after rejection in 6/2023, complicated by diarrhea 7/2023, tested positive for Norovirus, treated conservatively with fluids and watching monitoring  Repeat testing in 9/2023 outpatient showed GI PCR and Cdiff negative  Denies any recent sick contact  Denies recent travel  Reports COVID vaccinated only x2, primary vaccination without booster    ANTIBIOTIC:  cefepime (9/13 -->)    DIAGNOSIS and IMPRESSION:  #Viral Infection Coronavirus (not SARS-COV-2)  #Fever, Loose stools  #Mixed NICM/ICM s/p OHT on 6/21/22 (CMV +/+, Toxo -/-)    - symptoms likely from Viral Infection 2/2 Coronavirus, RVP positive for Coronavirus, not SARS-COV-2  - dry cough improving, CXR clear, Strep UAg negative, Legionella negative, Blood culture NGTD, doubt bacterial pneumonia  - dysuria resolved, UA grossly negative/UCx no growth, low suspicion for UTI/Pyelonephritis  - loose stools, GI PCR and Cdiff negative  - hemodynamically stable, saturating well on RA    RECOMMENDATIONS:  - discontinue cefepime  - supportive care for Coronavirus, not SARS-COV-2  - monitor temperature curve  - trend WBC  - as for chronic intermittent diarrhea, obtain Stool O&P and Microsporidia smear (can follow up results outpatient)  - no opposition for discharge home on Infectious Disease standpoint, continue with supportive care for viral infection    #Prophylaxis  - CMV +/+: completed 6 months of valcyte. CMV PCR < 34 (9/5/23), follow up CMV PCR  - Toxo -/-: no ppx needed  - PJP: - sill on mepron 1500mg daily, Having received now >8 weeks since steroid treatment for rejection can discontinue at this time    Case d/w attending and primary team      Wilder Samuel Luchana, DO, PGY-5   ID fellow  [employer] Preferred  After 5pm/weekends call [phone]

## 2023-09-15 NOTE — PROGRESS NOTE ADULT - PROBLEM SELECTOR PROBLEM 1
Febrile illness
SIRS (systemic inflammatory response syndrome)
SIRS (systemic inflammatory response syndrome)
Febrile illness

## 2023-09-15 NOTE — DIETITIAN INITIAL EVALUATION ADULT - PROBLEM SELECTOR PLAN 2
- corona virus positive  - mostly upper airway symptoms, clear lungs  - SpO2 appropriately on RA  - PRN inhaler

## 2023-09-15 NOTE — DIETITIAN INITIAL EVALUATION ADULT - OTHER INFO
-- Pt is on Mepron, Tacrolimus, Cellcept for history of heart transplant.   -- Ordered for Nifedipine, Revatio, Atorvastatin, MgO, multivitamins/minerals.   -- IVF Lactated Ringers @ 100ml/hr.   -- Pt is on SSI (Lispro) to regulate blood glucose.   -- s/p MgSO4 on 9/14 for hypomagnesemia, current Mg WDL.   -- S/p Lasix on 9/13.

## 2023-09-15 NOTE — PROGRESS NOTE ADULT - PROBLEM SELECTOR PROBLEM 6
HTN (hypertension)
HTN (hypertension)
Chronic kidney disease, unspecified CKD stage
HTN (hypertension)

## 2023-09-15 NOTE — DIETITIAN INITIAL EVALUATION ADULT - EDUCATION DIETARY MODIFICATIONS
Provided recommendations to optimize PO and protein intake, recommended small frequent meals by ordering nutrient-dense snacks and leaving non-perishable food away from tray for later consumption during the day or between meals, to start with protein, and sips of supplement throughout the day; reviewed foods with protein and menu order procedures in hospital./(1) partially meets; needs review/practice/verbalization

## 2023-09-15 NOTE — PROGRESS NOTE ADULT - PROBLEM SELECTOR PROBLEM 3
History of heart transplant
Immunosuppression
History of heart transplant
History of heart transplant

## 2023-09-15 NOTE — DIETITIAN INITIAL EVALUATION ADULT - NSFNSGIASSESSMENTFT_GEN_A_CORE
Denies nausea/vomiting/constipation. Pt c/o diarrhea x 3 months (since June 2023), reports 4/5x BM daily causing wt loss despite fair to good PO intake.

## 2023-09-15 NOTE — DIETITIAN INITIAL EVALUATION ADULT - PERTINENT MEDS FT
MEDICATIONS  (STANDING):  aspirin enteric coated 81 milliGRAM(s) Oral daily  atorvastatin 20 milliGRAM(s) Oral at bedtime  atovaquone  Suspension 1500 milliGRAM(s) Oral two times a day  dextrose 5%. 1000 milliLiter(s) (50 mL/Hr) IV Continuous <Continuous>  dextrose 5%. 1000 milliLiter(s) (100 mL/Hr) IV Continuous <Continuous>  dextrose 50% Injectable 25 Gram(s) IV Push once  dextrose 50% Injectable 25 Gram(s) IV Push once  dextrose 50% Injectable 12.5 Gram(s) IV Push once  enoxaparin Injectable 40 milliGRAM(s) SubCutaneous every 24 hours  glucagon  Injectable 1 milliGRAM(s) IntraMuscular once  influenza  Vaccine (HIGH DOSE) 0.7 milliLiter(s) IntraMuscular once  insulin lispro (ADMELOG) corrective regimen sliding scale   SubCutaneous three times a day before meals  insulin lispro (ADMELOG) corrective regimen sliding scale   SubCutaneous at bedtime  lactated ringers. 1000 milliLiter(s) (100 mL/Hr) IV Continuous <Continuous>  magnesium oxide 400 milliGRAM(s) Oral daily  multivitamin 1 Tablet(s) Oral daily  mycophenolate mofetil 500 milliGRAM(s) Oral two times a day  NIFEdipine XL 30 milliGRAM(s) Oral daily  nystatin    Suspension 657195 Unit(s) Oral four times a day  sildenafil (REVATIO) 10 milliGRAM(s) Oral two times a day  tacrolimus 1.5 milliGRAM(s) Oral <User Schedule>  tacrolimus 2 milliGRAM(s) Oral <User Schedule>    MEDICATIONS  (PRN):  albuterol/ipratropium for Nebulization 3 milliLiter(s) Nebulizer every 6 hours PRN Shortness of Breath and/or Wheezing  dextrose Oral Gel 15 Gram(s) Oral once PRN Blood Glucose LESS THAN 70 milliGRAM(s)/deciliter

## 2023-09-15 NOTE — PROGRESS NOTE ADULT - ATTENDING COMMENTS
66M with ACC/AHA Stage D mixed NICM/ICM s/p PPM upgraded to CRT-D, CAD s/p PCI to mLAD 4/2022, eventually s/p OHT on 6/21/22 (CMV +/+, Toxo -/-), course most recently complicated with rejection treated with pulse dose steroids 6.2023, then on higher cellcept ./tacr,  norovirus in July with now resolved diarrhea , sent to ER with fever, cough , sore throat, myalgias for few days as well as new symptoms of dysuria. recently completed short course of valcyte ppx  RVP + for non COVID 19 coronavirus, CXR clear, CMV PCR <34 on 9/13; Gi PCR neg  UA (non micro) with no LE, or nitrites and dysuria resolved, UC neg  Repeat CMv PCR <34    recommend checking weekly CMV PCR For 4 weeks, then every 2 weeks x2 months, then monthly for 3 months. risk of reactivation now off CMV PPX    - completed sufficient PCP prophylaxis post-rejection treatment    - chronic diarrhea Gi PCr eng, Send O and P and Mcirosporidia in stool  - agree with Gi eval and consideration for colonoscopy. Truong in outpatient setting.   - please refer to ID for follow up of these results (microsporidia stain will take time)          Thank you for involving us in the care of this patient  Transplant ID will sign off at this time in anticipation for patient discharge  Please call or page with additional questions  Pager; #[phone]  Teams: from 8 am to 5 pm  Keke Escamilla MD

## 2023-09-15 NOTE — DIETITIAN INITIAL EVALUATION ADULT - REASON FOR ADMISSION
Viral infection    Per chart: "67M with history of DM2, CKD stage 3, mixed NICM/ICM s/p PPM upgraded to CRT-D, CAD s/p PCI to mLAD in 4/2022, and VT now s/p OHT on 6/21/22 (CMV +/+, toxo -/-), recent biopsy c/f "mild transplant rejection" 7/2023 sent to ED from HF clinic due to fever. Patient states he started feeling ill on Sunday. He had nasal congestion with cough productive of some whitish sputum. No SOB. He then started having bodyaches with chills and spiked a fever or 100.5. He has been having chronic watery diarrhea for ~2 months. They were told this could be related to uptitration of immunosuppressants. He has average ~2 watery stools/day, not necessarily worsened past few days. Denies abdominal pain, n/v, dysuria, rash. He lives at home with family, no recent sick contact or travel history. His wife/daughter traveled out of [facility] recently. Minimally eating for past few days due to low appetite. "

## 2023-09-15 NOTE — DIETITIAN INITIAL EVALUATION ADULT - PERTINENT LABORATORY DATA
09-15    138  |  104  |  13  ----------------------------<  104<H>  4.1   |  22  |  0.95    Ca    9.3      15 Sep 2023 05:37  Phos  3.6     09-14  Mg     1.8     09-15    TPro  6.2  /  Alb  3.5  /  TBili  0.6  /  DBili  x   /  AST  12  /  ALT  9<L>  /  AlkPhos  71  09-14    CAPILLARY BLOOD GLUCOSE  POCT Blood Glucose.: 98 mg/dL (15 Sep 2023 12:31)  POCT Blood Glucose.: 90 mg/dL (15 Sep 2023 07:56)  POCT Blood Glucose.: 101 mg/dL (14 Sep 2023 22:13)  POCT Blood Glucose.: 94 mg/dL (14 Sep 2023 17:41)    A1C with Estimated Average Glucose Result: 5.8 % (09-14-23 @ 05:44)

## 2023-09-15 NOTE — PROGRESS NOTE ADULT - PROBLEM SELECTOR PLAN 3
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

## 2023-09-15 NOTE — DIETITIAN INITIAL EVALUATION ADULT - NSFNSADHERENCEPTAFT_GEN_A_CORE
Pt is on Metformin and Farxiga to regulate blood glucose PTA. Most recent HbA1c of 5.8 on 9/14 indicates good glycemic control.

## 2023-09-15 NOTE — PROGRESS NOTE ADULT - PROBLEM SELECTOR PLAN 3
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 Global longitudinal strain is -17.6 %  -repeat TTE with normal biventricular fxn EMB biopsy from June with ISHLT Grade 2R/3A treated with steroid pulse  On Sildenafil 10mg TID for pHTN pre-transplant  TTE 6/21/23: LVEF 60%, G1DD, reduced RV systolic function. Global longitudinal strain is -17.6 %  -repeat TTE with normal biventricular fxn  OK to DC home.  We will arrange for post hospitalization follow up visit next week

## 2023-09-15 NOTE — DIETITIAN INITIAL EVALUATION ADULT - ENERGY INTAKE
Adequate (%) Pt reports fair to good PO intake of foods provided while in house. Pt is aware of menu ordering procedure in house, has been ordering foods since admission.

## 2023-09-15 NOTE — DIETITIAN INITIAL EVALUATION ADULT - MUSCLE MASS (LOSS OF MUSCLE)
Temples.../Clavicles...
Normal rate, regular rhythm.  Heart sounds S1, S2.  No murmurs, rubs or gallops. No LE edema.

## 2023-09-15 NOTE — DIETITIAN INITIAL EVALUATION ADULT - ADD RECOMMEND
-- Continue micronutrient supplements (multivitamins/minerals), pending no medical contraindications, for micronutrient support.   -- Monitor PO intake, GI tolerance, skin integrity, labs, weight, and bowel movement regularity.   -- Will continue to honor food and beverage preferences within diet restriction of patient in an effort to maximize level of nutrient intake.  -- Assist with meals PRN and encourage PO intake.  -- Malnutrition alert placed in chart.

## 2023-09-15 NOTE — PROGRESS NOTE ADULT - ATTENDING COMMENTS
Pt feels well except for loose stools which is a chronic issue.  Fever likely due to recent cold.    Remains afebrile with unremarkable labs except for +CMV.   GI to see patient. Will decide discharge based on GI's assessment. Otherwise, ok to DC home from heart tx standpoint.  Plan to decrease tacrolimus to 1.5mg BID, increase cellcept to 1000mg BID and STOP Mepron.   Will need weekly CMV titers.  PLan as above.

## 2023-09-15 NOTE — DIETITIAN INITIAL EVALUATION ADULT - PROBLEM SELECTOR PLAN 3
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

## 2023-09-15 NOTE — PROGRESS NOTE ADULT - PROBLEM SELECTOR PLAN 2
Anesthesia Pre Eval Note    Anesthesia ROS/Med Hx    Overall Review:  EKG was reviewed and Echo was reviewed     Anesthetic Complication History:  Patient does not have a history of anesthetic complications    No History of difficult airway  No history of malignant hyperthermia  No PONVNo history of awareness of surgery under anesthesia    Pulmonary Review:    Negative for pneumonia  Positive for sleep apnea - CPAP  Negative for COPD   Negative for asthma  Negative for recent URI   The patient is not a smoker.    Neuro/Psych Review:  Negative for seizures   Negative for neuromuscular disease  Negative for developmental delays  Negative for TIA  Negative for CVA  Negative for headaches  Positive for psychiatric history - Depression and Anxiety    Cardiovascular Review:  Comments: Echo 3/14/2022:  F/U Pulmonary Hypertension.  Moderate pulmonary hypertension; RVSP 40 mmHg. PAEDP 14 mmHg.  Mildly increased right ventricular chamber size with mildly decreased radial function, FAC 32 %.  Normal right ventricular longitudinal function, TAPSE 21 mm. RV longitudinal strain, - 10 %.  Normal left ventricular chamber size, wall thickness and systolic function with no regional wall motion abnormalities. LV EF 55 %. GLS  -17 %.  Grade II diastolic dysfunction of the left ventricle (pseudonormal filling pattern).  Mild right atrial enlargement.  No significant valve abnormalities.  Normal IVC dimension with <50% respiratory change of the inferior vena cava.  No pericardial effusion.  Compared to prior study, RVSP has increased from 37 mmHg (RAP 3) to 40 mmHg (RAP 8). No other significant change.  Exercise tolerance: good (>4 METS)  Positive for CHF - compensated  Negative for cardiomyopathy  Positive for pulmonary hypertension  Negative for past MI  Negative for ICD   Negative for pacemaker   Negative for CAD  Negative for CABG/stent  Negative for angina  Negative for valvular problems/murmurs    Negative for  dysrhythmias  Negative for orthopnea  Negative for PND  Negative for PVD  Negative for BRAMBILA/SOB  Positive for hypertension - well controlled  Negative for hyperlipidemia    GI/HEPATIC/RENAL Review:    Negative for hiatal hernia  Negative for nausea  Negative for GERD  Negative for ulcer disease  Negative for hepatitisNegative for liver disease  Negative for renal disease  Positive for bowel prep    End/Other Review:  Negative for diabetes  Positive for obesity class III - 40.00 - 49.99  Positive for hypothyroidism  Negative for hyperthyroidism  Negative for anemia  Negative for thrombocytopenia  Negative for arthritis  Negative for substance abuse   Additional Results:  EKG:  No results found for this or any previous visit (from the past 4464 hour(s)).    ALLERGIES:   -- Penicillins -- HIVES   -- Tylenol With Codeine -- HIVES       Lab Results       Component                Value               Date                       WBC                      6.9                 10/13/2021                 WBC                      7.2                 12/19/2019                 RBC                      4.79                10/13/2021                 RBC                      4.90                12/19/2019                 HGB                      14.1                10/13/2021                 HGB                      14.0                12/19/2019                 HCT                      45.2                10/13/2021                 HCT                      44.3                12/19/2019                 MCHC                     31.2 (L)            10/13/2021                 MCHC                     31.6 (L)            12/19/2019                 SODIUM                   140                 03/14/2022                 SODIUM                   144                 08/18/2018                 POTASSIUM                4.0                 03/14/2022                 POTASSIUM                4.0                 08/18/2018                 CHLORIDE                  109 (H)             03/14/2022                 CHLORIDE                 107                 08/18/2018                 CO2                      27                  03/14/2022                 CO2                      30                  08/18/2018                 GLUCOSE                  111 (H)             03/14/2022                 GLUCOSE                  110 (H)             08/18/2018                 BUN                      14                  03/14/2022                 BUN                      14                  08/18/2018                 CREATININE               0.61                03/14/2022                 CREATININE               0.74                08/18/2018                 GFRESTIMATE              >90                 03/14/2022                 GFRA                     >90                 08/18/2018                 GFRNA                    >90                 08/18/2018                 CALCIUM                  8.7                 03/14/2022                 CALCIUM                  8.4                 08/18/2018                 PLT                      219                 10/13/2021                 PLT                      272                 12/19/2019             Prior to Admission medications :  Medication furosemide (LASIX) 20 MG tablet, Sig Take 1 tablet by mouth daily., Start Date 3/22/22, End Date , Taking? Yes, Authorizing Provider ANTONELLA Suarez    Medication metoPROLOL tartrate (LOPRESSOR) 100 MG tablet, Sig Take 1 tablet by mouth 2 times daily., Start Date 3/22/22, End Date 6/20/22, Taking? Yes, Authorizing Provider ANTONELLA Suarez    Medication electrolyte/PEG 3350 (Nulytely with Flavor Packs) 420 g solution, Sig TAKE AS DIRECTED AS INDICATED IN COLONOSCOPY PATIENT INSTRUCTION LETTER, Start Date 3/4/22, End Date , Taking? Yes, Authorizing Provider Mitch Morales MD    Medication sertraline (ZOLOFT) 50 MG tablet, Sig Take 0.5 tablets by mouth daily for 7 days, THEN 1  tablet daily for 23 days., Start Date 3/2/22, End Date 4/1/22, Taking? Yes, Authorizing Provider Kiera Keller MD    Medication albuterol 108 (90 Base) MCG/ACT inhaler, Sig Inhale 2 puffs into the lungs every 4 hours as needed for Shortness of Breath or Wheezing., Start Date 10/13/21, End Date , Taking? Yes, Authorizing Provider John Currie, DO    Medication oxybutynin (DITROPAN-XL) 5 MG 24 hr tablet, Sig Take 1 tablet by mouth daily., Start Date 10/13/21, End Date , Taking? Yes, Authorizing Provider John Currie, DO    Medication amLODIPine (NORVASC) 10 MG tablet, Sig Take 1 tablet by mouth daily.  Patient taking differently: Take 10 mg by mouth daily (at noon). , Start Date 10/13/21, End Date , Taking? Yes, Authorizing Provider John Currie, DO    Medication meloxicam (MOBIC) 15 MG tablet, Sig Take 1 tablet by mouth daily., Start Date 8/4/21, End Date , Taking? Yes, Authorizing Provider Zoe Call MD    Medication diclofenac potassium 25 MG capsule, Sig Take 25 mg by mouth 1 time.  Patient not taking: Reported on 3/22/2022, Start Date , End Date , Taking? , Authorizing Provider Outside Provider            Relevant Problems   No relevant active problems       Physical Exam     Airway   Mallampati: III  TM Distance: >3 FB  Neck ROM: Full  Neck: Thick and Able to place in sniff position  TMJ Mobility: Good    Cardiovascular  Cardiovascular exam normal  Cardio Rhythm: Regular  Cardio Rate: Normal    Head Assessment  Head assessment: Normocephalic and Atraumatic    General Assessment  General Assessment: Alert and oriented and No acute distress    Dental Exam    Patient has:  Upper dentures    Pulmonary Exam  Pulmonary exam normal  Breath sounds clear to auscultation:  Yes    Abdominal Exam  Abdominal exam normal      Anesthesia Plan:    ASA Status: 3  Anesthesia Type: MAC    Induction: Intravenous  Preferred Airway Type: Mask  Maintenance: TIVA  Premedication: IV      Post-op Pain Management: Per  Surgeon      Checklist  Reviewed: Lab Results, Pregnancy Test Results, Patient Summary, Allergies, Care Everywhere, Past Med History, Medications, Beta Blocker Status, Chest X-Rays, EKG, Consultations, Nursing Notes, Outside Records, NPO Status and Problem list  Consent/Risks Discussed Statement:  The proposed anesthetic plan, including its risks and benefits, have been discussed with the Patient along with the risks and benefits of alternatives. Questions were encouraged and answered and the patient and/or representative understands and agrees to proceed.        I discussed with the patient (and/or patient's legal representative) the risks and benefits of the proposed anesthesia plan, MAC, which may include services performed by other anesthesia providers.    Alternative anesthesia plans, if available, were reviewed with the patient (and/or patient's legal representative). Discussion has been held with the patient (and/or patient's legal representative) regarding risks of anesthesia, which include Nausea, Allergic Reaction, Intra-operative Awareness, Nerve Injury, Dental Injury, Vomiting, Headache and Aspiration and emergent situations that may require change in anesthesia plan.    The patient (and/or patient's legal representative) has indicated understanding, his/her questions have been answered, and he/she wishes to proceed with the planned anesthetic.    Blood Products: Not Anticipated    Comments  Plan Comments: R/B of MAC discussed with patient including but not limited to cardiac complications, respiratory complications, CNS complications, nausea and vomiting. Possible need to convert to general anesthesia d/w patient. Questions answered and patient wishes to proceed.     chronic,   Recommend GI consult, Stool O&P, microsporidia smear. chronic,   Recommend GI consult, Stool O&P, microsporidia smear.  GI will follow up as outpt

## 2023-09-15 NOTE — PROGRESS NOTE ADULT - SUBJECTIVE AND OBJECTIVE BOX
Follow Up:      Interval History/ROS: Afebrile. No leukocytosis. Still has lingering dry cough but otherwise feels well.       REVIEW OF SYSTEMS  [  ] ROS unobtainable because:    [ x ] All other systems negative except as noted below    Constitutional:  [ ] fever [ ] chills  [ ] weight loss  [ ]night sweat  [ ]poor appetite/PO intake [ ]fatigue   Skin:  [ ] rash [ ] phlebitis	  Eyes: [ ] icterus [ ] pain  [ ] discharge	  ENMT: [ ] sore throat  [ ] thrush [ ] ulcers [ ] exudates [ ]anosmia  Respiratory: [ ] dyspnea [ ] hemoptysis [ x] cough [ ] sputum	  Cardiovascular:  [ ] chest pain [ ] palpitations [ ] edema	  Gastrointestinal:  [ ] nausea [ ] vomiting [ ] diarrhea [ ] constipation [ ] pain	  Genitourinary:  [ ] dysuria [ ] frequency [ ] hematuria [ ] discharge [ ] flank pain  [ ] incontinence  Musculoskeletal:  [ ] myalgias [ ] arthralgias [ ] arthritis  [ ] back pain  Neurological:  [ ] headache [ ] weakness [ ] seizures  [ ] confusion/altered mental status    Allergies  No Known Allergies        ANTIMICROBIALS:    atovaquone  Suspension 1500 two times a day  cefepime   IVPB 2000 every 12 hours  cefepime   IVPB    nystatin    Suspension [id number] four times a day        OTHER MEDS: MEDICATIONS  (STANDING):  albuterol/ipratropium for Nebulization 3 every 6 hours PRN  aspirin enteric coated 81 daily  atorvastatin 20 at bedtime  dextrose 50% Injectable 25 once  dextrose 50% Injectable 25 once  dextrose 50% Injectable 12.5 once  dextrose Oral Gel 15 once PRN  enoxaparin Injectable 40 every 24 hours  glucagon  Injectable 1 once  influenza  Vaccine (HIGH DOSE) 0.7 once  insulin lispro (ADMELOG) corrective regimen sliding scale  three times a day before meals  insulin lispro (ADMELOG) corrective regimen sliding scale  at bedtime  mycophenolate mofetil 500 two times a day  NIFEdipine XL 30 daily  sildenafil (REVATIO) 10 two times a day  tacrolimus 2 <User Schedule>  tacrolimus 1.5 <User Schedule>      Vital Signs Last 24 Hrs  T(F): 98.4 (09-15-23 @ 04:16), Max: 100.4 (09-13-23 @ 15:30)    Vital Signs Last 24 Hrs  HR: 101 (09-15-23 @ 04:16) (90 - 106)  BP: 97/63 (09-15-23 @ 04:16) (97/63 - 145/93)  RR: 18 (09-15-23 @ 04:16)  SpO2: 98% (09-15-23 @ 04:16) (93% - 98%)  Wt(kg): --    EXAM:    Physical Exam:  Constitutional:  well preserved, comfortable  Head/Eyes: no icterus  ENT:  supple  LUNGS:  CTA  CVS:  regular rhythm, no murmur  Abd:  soft, non-tender; non-distended  Ext:  no edema  Vascular:  IV site no erythema tenderness or discharge  MSK:  joints without swelling  Neuro: AAO X 3, non- focal    Labs:                        13.6   6.92  )-----------( 222      ( 15 Sep 2023 05:37 )             41.1     09-15    138  |  104  |  13  ----------------------------<  104<H>  4.1   |  22  |  0.95    Ca    9.3      15 Sep 2023 05:37  Phos  3.6     09-14  Mg     1.8     09-15    TPro  6.2  /  Alb  3.5  /  TBili  0.6  /  DBili  x   /  AST  12  /  ALT  9<L>  /  AlkPhos  71  09-14      WBC Trend:  WBC Count: 6.92 (09-15-23 @ 05:37)  WBC Count: 6.26 (09-14-23 @ 05:44)  WBC Count: 12.14 (09-13-23 @ 15:36)      Creatine Trend:  Creatinine: 0.95 (09-15)  Creatinine: 1.01 (09-14)  Creatinine: 1.18 (09-13)      Liver Biochemical Testing Trend:  Alanine Aminotransferase (ALT/SGPT): 9 *L* (09-14)  Alanine Aminotransferase (ALT/SGPT): 10 (09-13)  Alanine Aminotransferase (ALT/SGPT): 11 (07-13)  Alanine Aminotransferase (ALT/SGPT): 14 (06-28)  Alanine Aminotransferase (ALT/SGPT): 17 (09-22)  Aspartate Aminotransferase (AST/SGOT): 12 (09-14-23 @ 05:44)  Aspartate Aminotransferase (AST/SGOT): 11 (09-13-23 @ 15:36)  Aspartate Aminotransferase (AST/SGOT): 12 (07-13-23 @ 10:58)  Aspartate Aminotransferase (AST/SGOT): 16 (06-28-23 @ 08:22)  Aspartate Aminotransferase (AST/SGOT): 17 (09-22-22 @ 09:01)  Bilirubin Total: 0.6 (09-14)  Bilirubin Total: 0.9 (09-13)  Bilirubin Total: 1.6 (07-13)  Bilirubin Total: 1.1 (06-28)  Bilirubin Total, Serum: 0.9 (09-22)      Trend LDH  05-27-22 @ 15:23  239      Urinalysis Basic - ( 15 Sep 2023 05:37 )    Color: x / Appearance: x / SG: x / pH: x  Gluc: 104 mg/dL / Ketone: x  / Bili: x / Urobili: x   Blood: x / Protein: x / Nitrite: x   Leuk Esterase: x / RBC: x / WBC x   Sq Epi: x / Non Sq Epi: x / Bacteria: x        MICROBIOLOGY:        Culture - Urine (collected 13 Sep 2023 18:49)  Source: Clean Catch Clean Catch (Midstream)  Final Report:    No growth    Culture - Blood (collected 13 Sep 2023 15:15)  Source: .Blood Blood-Peripheral  Preliminary Report:    No growth at 24 hours    Culture - Blood (collected 13 Sep 2023 15:00)  Source: .Blood Blood-Peripheral  Preliminary Report:    No growth at 24 hours    Culture - Blood (collected 03 Jul 2022 11:15)  Source: .Blood Blood  Final Report:    No Growth Final    Culture - Blood (collected 03 Jul 2022 11:00)  Source: .Blood Blood  Final Report:    No Growth Final    Culture - Acid Fast - Body Fluid w/Smear (collected 21 Jun 2022 23:09)  Source: .Body Fluid Other  Final Report:    No acid fast bacilli isolated after 6 weeks.    Culture - Fungal, Body Fluid (collected 21 Jun 2022 23:09)  Source: .Body Fluid Other  Final Report:    No fungus isolated after 4 weeks.    Culture - Body Fluid with Gram Stain (collected 21 Jun 2022 23:09)  Source: .Body Fluid PROCUREMENT FLUID  Final Report:    No growth at 5 days    Culture - Fungal, Other (collected 21 Jun 2022 23:09)  Source: .Other Other  Final Report:    No fungus isolated after 4 weeks.    Culture - Surgical Swab (collected 21 Jun 2022 23:09)  Source: .Surgical Swab AICD POCKET  Final Report:    Growth in fluid media only Staphylococcus epidermidis    Growth in fluid media only Morganella morganii  Organism: Staphylococcus epidermidis  Morganella morganii  Organism: Morganella morganii    Sensitivities:      Method Type: ANABELL      -  Amikacin: S <=16      -  Amoxicillin/Clavulanic Acid: R >16/8      -  Ampicillin: R >16 These ampicillin results predict results for amoxicillin      -  Ampicillin/Sulbactam: I 16/8 Enterobacter, Klebsiella aerogenes, Citrobacter, and Serratia may develop resistance during prolonged therapy (3-4 days)      -  Aztreonam: S <=4      -  Cefazolin: R >16 Enterobacter, Klebsiella aerogenes, Citrobacter, and Serratia may develop resistance during prolonged therapy (3-4 days)      -  Cefepime: S <=2      -  Cefoxitin: S <=8      -  Ceftriaxone: S <=1 Enterobacter, Klebsiella aerogenes, Citrobacter, and Serratia may develop resistance during prolonged therapy      -  Ciprofloxacin: S <=0.25      -  Ertapenem: S <=0.5      -  Gentamicin: S <=2      -  Imipenem: S <=1      -  Levofloxacin: S <=0.5      -  Meropenem: S <=1      -  Piperacillin/Tazobactam: S <=8      -  Tobramycin: S <=2      -  Trimethoprim/Sulfamethoxazole: S <=0.5/9.5  Organism: Staphylococcus epidermidis    Sensitivities:      Method Type: ANABELL      -  Ampicillin/Sulbactam: R <=8/4      -  Cefazolin: R <=4      -  Clindamycin: R 2 This isolate is presumed to be clindamycin resistant based on detection of inducible resistance. Clindamycin may still be effective in some patients.      -  Erythromycin: R >4      -  Gentamicin: R >8 Should not be used as monotherapy      -  Oxacillin: R >2      -  Penicillin: R 8      -  Rifampin: S <=1 Should not be used as monotherapy      -  Tetra/Doxy: S <=1      -  Trimethoprim/Sulfamethoxazole: R >2/38      -  Vancomycin: S 2          HIV-1 RNA Quantitative, Viral Load: NOT DET. copies/mL (05-27-22 @ 15:30)  HIV-1 Viral Load Result: NOT DET. (05-27-22 @ 15:30)    Cryptococcal Antigen - Serum: Negative (09-13-23 @ 17:35)  Cytomegalovirus By PCR: Det <34.5 IU/mL (09-13-23 @ 16:25)  Toxoplasma IgG Interpretation: Negative (09-13-23 @ 16:25)  Toxoplasma IgM Interpretation: Negative (09-13-23 @ 16:25)        Legionella Antigen, Urine: Negative (09-13 @ 20:01)  Cryptococcal Antigen - Serum: Negative (09-13 @ 17:35)  Rapid RVP Result: Detected (09-13 @ 15:34)        Blood Gas Venous - Lactate: 1.4 (09-13 @ 15:35)                    
Follow Up:      Interval History/ROS: Had fever 100.4F yesterday afternoon, but no fevers overnight. Reports no longer having chills, cough improve, no longer having dysuria. Reports having bouts of loose stool, already had 3 this morning.       REVIEW OF SYSTEMS  [  ] ROS unobtainable because:    [ x ] All other systems negative except as noted below    Constitutional:  [ ] fever [ ] chills  [ ] weight loss  [ ]night sweat  [ ]poor appetite/PO intake [ ]fatigue   Skin:  [ ] rash [ ] phlebitis	  Eyes: [ ] icterus [ ] pain  [ ] discharge	  ENMT: [ ] sore throat  [ ] thrush [ ] ulcers [ ] exudates [ ]anosmia  Respiratory: [ ] dyspnea [ ] hemoptysis [ ] cough [ ] sputum	  Cardiovascular:  [ ] chest pain [ ] palpitations [ ] edema	  Gastrointestinal:  [ ] nausea [ ] vomiting [x ] diarrhea [ ] constipation [ ] pain	  Genitourinary:  [ ] dysuria [ ] frequency [ ] hematuria [ ] discharge [ ] flank pain  [ ] incontinence  Musculoskeletal:  [ ] myalgias [ ] arthralgias [ ] arthritis  [ ] back pain  Neurological:  [ ] headache [ ] weakness [ ] seizures  [ ] confusion/altered mental status    Allergies  No Known Allergies        ANTIMICROBIALS:    atovaquone  Suspension 1500 two times a day  cefepime   IVPB 2000 every 12 hours  nystatin    Suspension [id number] four times a day        OTHER MEDS: MEDICATIONS  (STANDING):  albuterol/ipratropium for Nebulization 3 every 6 hours PRN  aspirin enteric coated 81 daily  atorvastatin 20 at bedtime  dextrose 50% Injectable 25 once  dextrose 50% Injectable 12.5 once  dextrose 50% Injectable 25 once  dextrose Oral Gel 15 once PRN  enoxaparin Injectable 40 every 24 hours  glucagon  Injectable 1 once  insulin lispro (ADMELOG) corrective regimen sliding scale  at bedtime  insulin lispro (ADMELOG) corrective regimen sliding scale  three times a day before meals  mycophenolate mofetil 500 two times a day  sildenafil (REVATIO) 10 two times a day  tacrolimus 1.5 <User Schedule>  tacrolimus 2 <User Schedule>      Vital Signs Last 24 Hrs  T(F): 98.6 (09-14-23 @ 09:55), Max: 100.4 (09-13-23 @ 15:30)    Vital Signs Last 24 Hrs  HR: 98 (09-14-23 @ 09:55) (96 - 118)  BP: 124/76 (09-14-23 @ 08:11) (92/65 - 124/76)  RR: 18 (09-14-23 @ 08:11)  SpO2: 99% (09-14-23 @ 08:11) (95% - 99%)  Wt(kg): --    EXAM:    Physical Exam:  Constitutional:  well preserved, comfortable  Head/Eyes: no icterus  ENT:  supple  LUNGS:  CTA  CVS:  regular rhythm, no murmur  Abd:  soft, non-tender; non-distended  Ext:  no edema  Vascular:  IV site no erythema tenderness or discharge  MSK:  joints without swelling  Neuro: AAO X 3, non- focal    Labs:                        12.7   6.26  )-----------( 208      ( 14 Sep 2023 05:44 )             39.1     09-14    139  |  107  |  14  ----------------------------<  115<H>  4.3   |  20<L>  |  1.01    Ca    9.2      14 Sep 2023 05:44  Phos  3.6     09-14  Mg     1.5     09-14    TPro  6.2  /  Alb  3.5  /  TBili  0.6  /  DBili  x   /  AST  12  /  ALT  9<L>  /  AlkPhos  71  09-14      WBC Trend:  WBC Count: 6.26 (09-14-23 @ 05:44)  WBC Count: 12.14 (09-13-23 @ 15:36)      Creatine Trend:  Creatinine: 1.01 (09-14)  Creatinine: 1.18 (09-13)      Liver Biochemical Testing Trend:  Alanine Aminotransferase (ALT/SGPT): 9 *L* (09-14)  Alanine Aminotransferase (ALT/SGPT): 10 (09-13)  Alanine Aminotransferase (ALT/SGPT): 11 (07-13)  Alanine Aminotransferase (ALT/SGPT): 14 (06-28)  Alanine Aminotransferase (ALT/SGPT): 17 (09-22)  Aspartate Aminotransferase (AST/SGOT): 12 (09-14-23 @ 05:44)  Aspartate Aminotransferase (AST/SGOT): 11 (09-13-23 @ 15:36)  Aspartate Aminotransferase (AST/SGOT): 12 (07-13-23 @ 10:58)  Aspartate Aminotransferase (AST/SGOT): 16 (06-28-23 @ 08:22)  Aspartate Aminotransferase (AST/SGOT): 17 (09-22-22 @ 09:01)  Bilirubin Total: 0.6 (09-14)  Bilirubin Total: 0.9 (09-13)  Bilirubin Total: 1.6 (07-13)  Bilirubin Total: 1.1 (06-28)  Bilirubin Total, Serum: 0.9 (09-22)      Trend LDH  05-27-22 @ 15:23  239      Urinalysis Basic - ( 14 Sep 2023 05:44 )    Color: x / Appearance: x / SG: x / pH: x  Gluc: 115 mg/dL / Ketone: x  / Bili: x / Urobili: x   Blood: x / Protein: x / Nitrite: x   Leuk Esterase: x / RBC: x / WBC x   Sq Epi: x / Non Sq Epi: x / Bacteria: x        MICROBIOLOGY:        Culture - Blood (collected 03 Jul 2022 11:15)  Source: .Blood Blood  Final Report:    No Growth Final    Culture - Blood (collected 03 Jul 2022 11:00)  Source: .Blood Blood  Final Report:    No Growth Final    Culture - Acid Fast - Body Fluid w/Smear (collected 21 Jun 2022 23:09)  Source: .Body Fluid Other  Final Report:    No acid fast bacilli isolated after 6 weeks.    Culture - Fungal, Body Fluid (collected 21 Jun 2022 23:09)  Source: .Body Fluid Other  Final Report:    No fungus isolated after 4 weeks.    Culture - Body Fluid with Gram Stain (collected 21 Jun 2022 23:09)  Source: .Body Fluid PROCUREMENT FLUID  Final Report:    No growth at 5 days    Culture - Fungal, Other (collected 21 Jun 2022 23:09)  Source: .Other Other  Final Report:    No fungus isolated after 4 weeks.    Culture - Surgical Swab (collected 21 Jun 2022 23:09)  Source: .Surgical Swab AICD POCKET  Final Report:    Growth in fluid media only Staphylococcus epidermidis    Growth in fluid media only Morganella morganii  Organism: Staphylococcus epidermidis  Morganella morganii  Organism: Morganella morganii    Sensitivities:      Method Type: ANABELL      -  Amikacin: S <=16      -  Amoxicillin/Clavulanic Acid: R >16/8      -  Ampicillin: R >16 These ampicillin results predict results for amoxicillin      -  Ampicillin/Sulbactam: I 16/8 Enterobacter, Klebsiella aerogenes, Citrobacter, and Serratia may develop resistance during prolonged therapy (3-4 days)      -  Aztreonam: S <=4      -  Cefazolin: R >16 Enterobacter, Klebsiella aerogenes, Citrobacter, and Serratia may develop resistance during prolonged therapy (3-4 days)      -  Cefepime: S <=2      -  Cefoxitin: S <=8      -  Ceftriaxone: S <=1 Enterobacter, Klebsiella aerogenes, Citrobacter, and Serratia may develop resistance during prolonged therapy      -  Ciprofloxacin: S <=0.25      -  Ertapenem: S <=0.5      -  Gentamicin: S <=2      -  Imipenem: S <=1      -  Levofloxacin: S <=0.5      -  Meropenem: S <=1      -  Piperacillin/Tazobactam: S <=8      -  Tobramycin: S <=2      -  Trimethoprim/Sulfamethoxazole: S <=0.5/9.5  Organism: Staphylococcus epidermidis    Sensitivities:      Method Type: ANABELL      -  Ampicillin/Sulbactam: R <=8/4      -  Cefazolin: R <=4      -  Clindamycin: R 2 This isolate is presumed to be clindamycin resistant based on detection of inducible resistance. Clindamycin may still be effective in some patients.      -  Erythromycin: R >4      -  Gentamicin: R >8 Should not be used as monotherapy      -  Oxacillin: R >2      -  Penicillin: R 8      -  Rifampin: S <=1 Should not be used as monotherapy      -  Tetra/Doxy: S <=1      -  Trimethoprim/Sulfamethoxazole: R >2/38      -  Vancomycin: S 2    Culture - Tissue with Gram Stain (collected 21 Jun 2022 23:09)  Source: .Tissue Other  Final Report:    No growth at 5 days    Culture - Fungal, Tissue (collected 21 Jun 2022 23:09)  Source: .Tissue Other  Final Report:    No fungus isolated after 4 weeks.    Culture - Acid Fast - Tissue w/Smear (collected 21 Jun 2022 23:09)  Source: .Tissue Other  Final Report:    No acid fast bacilli isolated after 6 weeks.          HIV-1 RNA Quantitative, Viral Load: NOT DET. copies/mL (05-27-22 @ 15:30)  HIV-1 Viral Load Result: NOT DET. (05-27-22 @ 15:30)    Rapid RVP Result: Detected (09-13 @ 15:34)      Blood Gas Venous - Lactate: 1.4 (09-13 @ 15:35)        RADIOLOGY:  imaging below personally reviewed  < from: Xray Chest 1 View- PORTABLE-Urgent (09.13.23 @ 15:05) >  FINDINGS:    Support devices: Status post sternotomy with suture wires present.    Cardiac/mediastinum/hilum: Cardiac silhouette is within normal limits..    Lung parenchyma/Pleura: No focal parenchymal opacities,pleural   effusions, or pneumothorax.    Skeleton/soft tissues: Stable.      IMPRESSION:    No radiographic evidence of acute cardiopulmonary disease.    < end of copied text >      
Patient seen and examined at bedside.    Overnight Events:   NAEON.   No concerns or complaints this am. Still having diarrhea ~4 bouts daily. Cough and fevers improved.       REVIEW OF SYSTEMS:  CONSTITUTIONAL: No weakness, fevers or chills  EYES/ENT: No visual changes;  No dysphagia  NECK: No pain or stiffness  RESPIRATORY: No cough, wheezing, hemoptysis; No shortness of breath  CARDIOVASCULAR: No chest pain or palpitations; No lower extremity edema  GASTROINTESTINAL: No abdominal or epigastric pain. No nausea, vomiting. +diarrhea  BACK: No back pain  GENITOURINARY: No dysuria, frequency or hematuria  NEUROLOGICAL: No numbness or weakness  SKIN: No itching, burning, rashes, or lesions   All other review of systems is negative unless indicated above.        Current Meds:  albuterol/ipratropium for Nebulization 3 milliLiter(s) Nebulizer every 6 hours PRN  aspirin enteric coated 81 milliGRAM(s) Oral daily  atorvastatin 20 milliGRAM(s) Oral at bedtime  atovaquone  Suspension 1500 milliGRAM(s) Oral two times a day  dextrose 5%. 1000 milliLiter(s) IV Continuous <Continuous>  dextrose 5%. 1000 milliLiter(s) IV Continuous <Continuous>  dextrose 50% Injectable 25 Gram(s) IV Push once  dextrose 50% Injectable 25 Gram(s) IV Push once  dextrose 50% Injectable 12.5 Gram(s) IV Push once  dextrose Oral Gel 15 Gram(s) Oral once PRN  enoxaparin Injectable 40 milliGRAM(s) SubCutaneous every 24 hours  glucagon  Injectable 1 milliGRAM(s) IntraMuscular once  influenza  Vaccine (HIGH DOSE) 0.7 milliLiter(s) IntraMuscular once  insulin lispro (ADMELOG) corrective regimen sliding scale   SubCutaneous three times a day before meals  insulin lispro (ADMELOG) corrective regimen sliding scale   SubCutaneous at bedtime  lactated ringers. 1000 milliLiter(s) IV Continuous <Continuous>  magnesium oxide 400 milliGRAM(s) Oral daily  multivitamin 1 Tablet(s) Oral daily  mycophenolate mofetil 500 milliGRAM(s) Oral two times a day  NIFEdipine XL 30 milliGRAM(s) Oral daily  nystatin    Suspension [id number] Unit(s) Oral four times a day  sildenafil (REVATIO) 10 milliGRAM(s) Oral two times a day  tacrolimus 2 milliGRAM(s) Oral <User Schedule>  tacrolimus 1.5 milliGRAM(s) Oral <User Schedule>      PAST MEDICAL & SURGICAL HISTORY:  AV block      Essential hypertension      Chronic HFrEF (heart failure with reduced ejection fraction)      Chronic kidney disease, unspecified CKD stage      CAD (coronary artery disease)      HLD (hyperlipidemia)      Type 2 diabetes mellitus      History of heart transplant      Artificial cardiac pacemaker      History of open heart surgery          Vitals:  T(F): 98.4 (09-15), Max: 98.5 (09-14)  HR: 110 (09-15) (90 - 110)  BP: 112/84 (09-15) (97/63 - 145/93)  RR: 18 (09-15)  SpO2: 95% (09-15)  I&O's Summary    14 Sep 2023 07:01  -  15 Sep 2023 07:00  --------------------------------------------------------  IN: 100 mL / OUT: 400 mL / NET: -300 mL    15 Sep 2023 07:01  -  15 Sep 2023 13:20  --------------------------------------------------------  IN: 0 mL / OUT: 300 mL / NET: -300 mL        Physical Exam:  Appearance: No acute distress; well appearing  Eyes: PERRL, EOMI, pink conjunctiva  HENT: Normal oral mucosa  Cardiovascular: RRR, S1, S2, no murmurs, no edema; no JVD  Respiratory: Clear to auscultation bilaterally  Gastrointestinal: soft, non-tender, non-distended with normal bowel sounds  Musculoskeletal: No clubbing; no joint deformity   Neurologic: Non-focal  Lymphatic: No lymphadenopathy  Psychiatry: AAOx3, mood & affect appropriate  Skin: No rashes, ecchymoses, or cyanosis                          13.6   6.92  )-----------( 222      ( 15 Sep 2023 05:37 )             41.1     09-15    138  |  104  |  13  ----------------------------<  104<H>  4.1   |  22  |  0.95    Ca    9.3      15 Sep 2023 05:37  Phos  3.6     09-14  Mg     1.8     09-15    TPro  6.2  /  Alb  3.5  /  TBili  0.6  /  DBili  x   /  AST  12  /  ALT  9<L>  /  AlkPhos  71  09-14    PT/INR - ( 13 Sep 2023 15:36 )   PT: 12.7 sec;   INR: 1.16 ratio         PTT - ( 13 Sep 2023 15:36 )  PTT:27.5 sec                
continues to have diarrhea.    GENERAL: No fevers, no chills.  EYES: No blurry vision,  No photophobia  ENT: No sore throat.  No dysphagia  Cardiovascular: No chest pain, palpitations, orthopnea  Pulmonary: No cough, no wheezing. No shortness of breath  Gastrointestinal: No abdominal pain, +diarrhea, no constipation.    Musculoskeletal: No weakness.  No myalgias.  Dermatology:  No rashes.  Neuro: No Headache.  No vertigo.  No dizziness.  Psych: No anxiety, no depression.  Denies suicidal thoughts.    MEDICATIONS  (STANDING):  aspirin enteric coated 81 milliGRAM(s) Oral daily  atorvastatin 20 milliGRAM(s) Oral at bedtime  atovaquone  Suspension 1500 milliGRAM(s) Oral two times a day  dextrose 5%. 1000 milliLiter(s) (50 mL/Hr) IV Continuous <Continuous>  dextrose 5%. 1000 milliLiter(s) (100 mL/Hr) IV Continuous <Continuous>  dextrose 50% Injectable 25 Gram(s) IV Push once  dextrose 50% Injectable 25 Gram(s) IV Push once  dextrose 50% Injectable 12.5 Gram(s) IV Push once  enoxaparin Injectable 40 milliGRAM(s) SubCutaneous every 24 hours  glucagon  Injectable 1 milliGRAM(s) IntraMuscular once  influenza  Vaccine (HIGH DOSE) 0.7 milliLiter(s) IntraMuscular once  insulin lispro (ADMELOG) corrective regimen sliding scale   SubCutaneous three times a day before meals  insulin lispro (ADMELOG) corrective regimen sliding scale   SubCutaneous at bedtime  lactated ringers. 1000 milliLiter(s) (100 mL/Hr) IV Continuous <Continuous>  magnesium oxide 400 milliGRAM(s) Oral daily  multivitamin 1 Tablet(s) Oral daily  mycophenolate mofetil 500 milliGRAM(s) Oral two times a day  NIFEdipine XL 30 milliGRAM(s) Oral daily  nystatin    Suspension [id number] Unit(s) Oral four times a day  sildenafil (REVATIO) 10 milliGRAM(s) Oral two times a day  tacrolimus 1.5 milliGRAM(s) Oral <User Schedule>  tacrolimus 2 milliGRAM(s) Oral <User Schedule>    MEDICATIONS  (PRN):  albuterol/ipratropium for Nebulization 3 milliLiter(s) Nebulizer every 6 hours PRN Shortness of Breath and/or Wheezing  dextrose Oral Gel 15 Gram(s) Oral once PRN Blood Glucose LESS THAN 70 milliGRAM(s)/deciliter    Vital Signs Last 24 Hrs  T(C): 36.9 (15 Sep 2023 11:26), Max: 36.9 (14 Sep 2023 15:35)  T(F): 98.4 (15 Sep 2023 11:26), Max: 98.5 (14 Sep 2023 21:03)  HR: 110 (15 Sep 2023 11:26) (90 - 110)  BP: 112/84 (15 Sep 2023 11:26) (97/63 - 145/93)  BP(mean): --  RR: 18 (15 Sep 2023 11:26) (16 - 18)  SpO2: 95% (15 Sep 2023 11:26) (93% - 98%)    Parameters below as of 15 Sep 2023 11:26  Patient On (Oxygen Delivery Method): room air    PHYSICAL EXAM:  GENERAL: NAD, well-developed  HEAD:  Atraumatic, normocephalic  EYES: EOMI, +injected conjunctiva  NECK: Supple, no JVD  CHEST/LUNG: Clear to auscultation bilaterally; no wheezing or rales  HEART: Regular rate and rhythm; no murmurs, no LE edema   ABDOMEN: Soft, nontender, nondistended; bowel sounds present  EXTREMITIES:  2+ Peripheral Pulses, no clubbing, cyanosis  PSYCH: AAOx3, calm affect, not anxious  SKIN: No rashes or lesions    .  LABS:                         13.6   6.92  )-----------( 222      ( 15 Sep 2023 05:37 )             41.1     09-15    138  |  104  |  13  ----------------------------<  104<H>  4.1   |  22  |  0.95    Ca    9.3      15 Sep 2023 05:37  Phos  3.6     09-14  Mg     1.8     09-15    TPro  6.2  /  Alb  3.5  /  TBili  0.6  /  DBili  x   /  AST  12  /  ALT  9<L>  /  AlkPhos  71  09-14    PT/INR - ( 13 Sep 2023 15:36 )   PT: 12.7 sec;   INR: 1.16 ratio         PTT - ( 13 Sep 2023 15:36 )  PTT:27.5 sec  Urinalysis Basic - ( 15 Sep 2023 05:37 )    Color: x / Appearance: x / SG: x / pH: x  Gluc: 104 mg/dL / Ketone: x  / Bili: x / Urobili: x   Blood: x / Protein: x / Nitrite: x   Leuk Esterase: x / RBC: x / WBC x   Sq Epi: x / Non Sq Epi: x / Bacteria: x            RADIOLOGY, EKG & ADDITIONAL TESTS: Reviewed.     
Patient seen and examined at bedside.    Overnight Events:   NAEON.   Reports diarrhea and cough with sputum production. Fevers have resolved.     REVIEW OF SYSTEMS:  CONSTITUTIONAL: + weakness  EYES/ENT: No visual changes;  No dysphagia  NECK: No pain or stiffness  RESPIRATORY: + cough, sputum production. Denies SOB  CARDIOVASCULAR: No chest pain or palpitations; No lower extremity edema  GASTROINTESTINAL: No abdominal or epigastric pain. No nausea, vomiting, + diarrhea  No melena or hematochezia.  BACK: No back pain  GENITOURINARY: No dysuria, frequency or hematuria  NEUROLOGICAL: No numbness or weakness  SKIN: No itching, burning, rashes, or lesions   All other review of systems is negative unless indicated above.      Current Meds:  albuterol/ipratropium for Nebulization 3 milliLiter(s) Nebulizer every 6 hours PRN  aspirin enteric coated 81 milliGRAM(s) Oral daily  atorvastatin 20 milliGRAM(s) Oral at bedtime  atovaquone  Suspension 1500 milliGRAM(s) Oral two times a day  cefepime   IVPB 2000 milliGRAM(s) IV Intermittent every 12 hours  cefepime   IVPB      dextrose 5%. 1000 milliLiter(s) IV Continuous <Continuous>  dextrose 5%. 1000 milliLiter(s) IV Continuous <Continuous>  dextrose 50% Injectable 25 Gram(s) IV Push once  dextrose 50% Injectable 12.5 Gram(s) IV Push once  dextrose 50% Injectable 25 Gram(s) IV Push once  dextrose Oral Gel 15 Gram(s) Oral once PRN  enoxaparin Injectable 40 milliGRAM(s) SubCutaneous every 24 hours  glucagon  Injectable 1 milliGRAM(s) IntraMuscular once  insulin lispro (ADMELOG) corrective regimen sliding scale   SubCutaneous at bedtime  insulin lispro (ADMELOG) corrective regimen sliding scale   SubCutaneous three times a day before meals  lactated ringers. 1000 milliLiter(s) IV Continuous <Continuous>  magnesium oxide 400 milliGRAM(s) Oral daily  multivitamin 1 Tablet(s) Oral daily  mycophenolate mofetil 500 milliGRAM(s) Oral two times a day  nystatin    Suspension 493788 Unit(s) Oral four times a day  sildenafil (REVATIO) 10 milliGRAM(s) Oral two times a day  tacrolimus 1.5 milliGRAM(s) Oral <User Schedule>  tacrolimus 2 milliGRAM(s) Oral <User Schedule>      PAST MEDICAL & SURGICAL HISTORY:  AV block      Essential hypertension      Chronic HFrEF (heart failure with reduced ejection fraction)      Chronic kidney disease, unspecified CKD stage      CAD (coronary artery disease)      HLD (hyperlipidemia)      Type 2 diabetes mellitus      History of heart transplant      Artificial cardiac pacemaker      History of open heart surgery          Vitals:  T(F): 98.6 (09-14), Max: 100.4 (09-13)  HR: 98 (09-14) (96 - 118)  BP: 113/77 (09-14) (92/65 - 124/76)  RR: 18 (09-14)  SpO2: 96% (09-14)  I&O's Summary      Physical Exam:  Appearance: No acute distress; well appearing  Eyes: PERRL, EOMI, pink conjunctiva  HENT: Normal oral mucosa  Cardiovascular: RRR, S1, S2, no murmurs, ; no edema; no JVD  Respiratory: Clear to auscultation bilaterally  Gastrointestinal: soft, non-tender, non-distended with normal bowel sounds  Musculoskeletal: No clubbing; no joint deformity   Neurologic: Non-focal  Lymphatic: No lymphadenopathy  Psychiatry: AAOx3, mood & affect appropriate  Skin: No rashes, ecchymoses, or cyanosis                          12.7   6.26  )-----------( 208      ( 14 Sep 2023 05:44 )             39.1     09-14    139  |  107  |  14  ----------------------------<  115<H>  4.3   |  20<L>  |  1.01    Ca    9.2      14 Sep 2023 05:44  Phos  3.6     09-14  Mg     1.5     09-14    TPro  6.2  /  Alb  3.5  /  TBili  0.6  /  DBili  x   /  AST  12  /  ALT  9<L>  /  AlkPhos  71  09-14    PT/INR - ( 13 Sep 2023 15:36 )   PT: 12.7 sec;   INR: 1.16 ratio         PTT - ( 13 Sep 2023 15:36 )  PTT:27.5 sec                
[facility] Division of Hospital Medicine  Jonah Harvey MD  Available via MS Teams    SUBJECTIVE / OVERNIGHT EVENTS:  no acute events overnight   pt feeling slightly better, cough is improved, no SOB, no CP, no abd pain  still having diarrhea, had 3 movements this AM already, stable from prior   denies any fevers/chills     ADDITIONAL REVIEW OF SYSTEMS:  14 point ROS negative except as noted above     MEDICATIONS  (STANDING):  aspirin enteric coated 81 milliGRAM(s) Oral daily  atorvastatin 20 milliGRAM(s) Oral at bedtime  atovaquone  Suspension 1500 milliGRAM(s) Oral two times a day  cefepime   IVPB 2000 milliGRAM(s) IV Intermittent every 12 hours  cefepime   IVPB      dextrose 5%. 1000 milliLiter(s) (100 mL/Hr) IV Continuous <Continuous>  dextrose 5%. 1000 milliLiter(s) (50 mL/Hr) IV Continuous <Continuous>  dextrose 50% Injectable 25 Gram(s) IV Push once  dextrose 50% Injectable 25 Gram(s) IV Push once  dextrose 50% Injectable 12.5 Gram(s) IV Push once  enoxaparin Injectable 40 milliGRAM(s) SubCutaneous every 24 hours  glucagon  Injectable 1 milliGRAM(s) IntraMuscular once  insulin lispro (ADMELOG) corrective regimen sliding scale   SubCutaneous at bedtime  insulin lispro (ADMELOG) corrective regimen sliding scale   SubCutaneous three times a day before meals  lactated ringers. 1000 milliLiter(s) (100 mL/Hr) IV Continuous <Continuous>  magnesium oxide 400 milliGRAM(s) Oral daily  multivitamin 1 Tablet(s) Oral daily  mycophenolate mofetil 500 milliGRAM(s) Oral two times a day  nystatin    Suspension 258232 Unit(s) Oral four times a day  sildenafil (REVATIO) 10 milliGRAM(s) Oral two times a day  tacrolimus 2 milliGRAM(s) Oral <User Schedule>  tacrolimus 1.5 milliGRAM(s) Oral <User Schedule>    MEDICATIONS  (PRN):  albuterol/ipratropium for Nebulization 3 milliLiter(s) Nebulizer every 6 hours PRN Shortness of Breath and/or Wheezing  dextrose Oral Gel 15 Gram(s) Oral once PRN Blood Glucose LESS THAN 70 milliGRAM(s)/deciliter      I&O's Summary      PHYSICAL EXAM:  Vital Signs Last 24 Hrs  T(C): 36.9 (14 Sep 2023 11:55), Max: 38 (13 Sep 2023 15:30)  T(F): 98.5 (14 Sep 2023 11:55), Max: 100.4 (13 Sep 2023 15:30)  HR: 97 (14 Sep 2023 11:55) (96 - 105)  BP: 138/95 (14 Sep 2023 11:55) (92/65 - 138/95)  BP(mean): 84 (14 Sep 2023 04:30) (66 - 84)  RR: 18 (14 Sep 2023 11:55) (17 - 24)  SpO2: 97% (14 Sep 2023 11:55) (95% - 99%)    Parameters below as of 14 Sep 2023 11:55  Patient On (Oxygen Delivery Method): room air      PHYSICAL EXAM:  GENERAL: NAD, well-developed  HEAD:  Atraumatic, normocephalic  EYES: EOMI, +injected conjunctiva  NECK: Supple, no JVD  CHEST/LUNG: Clear to auscultation bilaterally; no wheezing or rales  HEART: Regular rate and rhythm; no murmurs, no LE edema   ABDOMEN: Soft, nontender, nondistended; bowel sounds present  EXTREMITIES:  2+ Peripheral Pulses, no clubbing, cyanosis  PSYCH: AAOx3, calm affect, not anxious  SKIN: No rashes or lesions      LABS:                        12.7   6.26  )-----------( 208      ( 14 Sep 2023 05:44 )             39.1     09-14    139  |  107  |  14  ----------------------------<  115<H>  4.3   |  20<L>  |  1.01    Ca    9.2      14 Sep 2023 05:44  Phos  3.6     09-14  Mg     1.5     09-14    TPro  6.2  /  Alb  3.5  /  TBili  0.6  /  DBili  x   /  AST  12  /  ALT  9<L>  /  AlkPhos  71  09-14    PT/INR - ( 13 Sep 2023 15:36 )   PT: 12.7 sec;   INR: 1.16 ratio         PTT - ( 13 Sep 2023 15:36 )  PTT:27.5 sec      Urinalysis Basic - ( 14 Sep 2023 05:44 )    Color: x / Appearance: x / SG: x / pH: x  Gluc: 115 mg/dL / Ketone: x  / Bili: x / Urobili: x   Blood: x / Protein: x / Nitrite: x   Leuk Esterase: x / RBC: x / WBC x   Sq Epi: x / Non Sq Epi: x / Bacteria: x        SARS-CoV-2: NotDetec (13 Sep 2023 15:34)      RADIOLOGY & ADDITIONAL TESTS:  New Imaging Personally Reviewed Today:  New Electrocardiogram Personally Reviewed Today:  Other Results Reviewed Today:   Prior or Outpatient Records Reviewed Today with Summary:    COORDINATION OF CARE:  Consultant Communication and Details of Discussion (where applicable):

## 2023-09-15 NOTE — PROGRESS NOTE ADULT - PROBLEM SELECTOR PROBLEM 4
Immunosuppression
Chronic kidney disease, unspecified CKD stage
Chronic kidney disease, unspecified CKD stage
Need for prophylactic measure

## 2023-09-15 NOTE — DIETITIAN INITIAL EVALUATION ADULT - PROBLEM SELECTOR PLAN 1
- leukocytosis & tachycardic meeting SIRS criteria  - fever with cough & congestion  - RVP + coronavirus (non-COVID)  - UA negative  - CXR no focal consolidation  - s/p 1L in ED, continue 100cc/hr for 10 hours  - continue empiric cefepime (9/13- ) as per transplant ID until further infectious work-up  - follow up blood & urine cx, urine legionella/strep, CMV titer, fungal studies  - send GI PCR/c.diff with next stool sample

## 2023-09-15 NOTE — PROGRESS NOTE ADULT - PROBLEM SELECTOR PLAN 4
-Tacro 1.5mg am, 2mg pM trough goal 8-10  -Decrease Cellcept to 500mg BID  -Consult for PharmD assistance. -Tacro decrease to 1.5mg BID trough goal 8-10  -Increase Cellcept to 1000mg BID

## 2023-09-15 NOTE — PROGRESS NOTE ADULT - TIME BILLING
face-to-face encounter, review of extensive medical records in this and prior charts, laboratory findings, radiographic and microbiology results; documentation as noted above and discussion of diagnostic impressions and plan with the patient and team
face-to-face encounter, review of extensive medical records in this and prior charts, laboratory findings, radiographic and microbiology results; documentation as noted above and discussion of diagnostic impressions and plan with the patient and team
- Review of notes/records, telemetry, vital signs and daily labs.   - General and cardiovascular physical examination.  - Generation of cardiovascular treatment plan.  - Coordination of care with primary team.
- Review of notes/records, telemetry, vital signs and daily labs.   - General and cardiovascular physical examination.  - Generation of cardiovascular treatment plan.  - Coordination of care with primary team.

## 2023-09-15 NOTE — PROGRESS NOTE ADULT - PROBLEM SELECTOR PLAN 1
RVP positive for coronavirus, not covid. Now having diarrhea again.     Recommendations:  - No growth on blood Cultures. UA negative. CXR clear lung fields  - Infectious studies so far negative, except RVP with coronavirus (not covid) and CMV PCR detected but below threshold.   - TxID consult. RVP positive for coronavirus, not covid. Now having diarrhea again.     Recommendations:  - No growth on blood Cultures. UA negative. CXR clear lung fields  - Infectious studies so far negative, except RVP with coronavirus (not covid) and CMV PCR detected but below threshold.   - TxID consult.  Continue supportive care

## 2023-09-16 ENCOUNTER — TRANSCRIPTION ENCOUNTER (OUTPATIENT)
Age: 68
End: 2023-09-16

## 2023-09-16 VITALS
SYSTOLIC BLOOD PRESSURE: 125 MMHG | HEART RATE: 96 BPM | OXYGEN SATURATION: 98 % | TEMPERATURE: 98 F | DIASTOLIC BLOOD PRESSURE: 84 MMHG | RESPIRATION RATE: 18 BRPM

## 2023-09-16 LAB
ANION GAP SERPL CALC-SCNC: 13 MMOL/L — SIGNIFICANT CHANGE UP (ref 5–17)
BUN SERPL-MCNC: 16 MG/DL — SIGNIFICANT CHANGE UP (ref 7–23)
CALCIUM SERPL-MCNC: 9.8 MG/DL — SIGNIFICANT CHANGE UP (ref 8.4–10.5)
CHLORIDE SERPL-SCNC: 105 MMOL/L — SIGNIFICANT CHANGE UP (ref 96–108)
CO2 SERPL-SCNC: 20 MMOL/L — LOW (ref 22–31)
CREAT SERPL-MCNC: 1.1 MG/DL — SIGNIFICANT CHANGE UP (ref 0.5–1.3)
EGFR: 74 ML/MIN/1.73M2 — SIGNIFICANT CHANGE UP
GLUCOSE BLDC GLUCOMTR-MCNC: 114 MG/DL — HIGH (ref 70–99)
GLUCOSE BLDC GLUCOMTR-MCNC: 98 MG/DL — SIGNIFICANT CHANGE UP (ref 70–99)
GLUCOSE SERPL-MCNC: 114 MG/DL — HIGH (ref 70–99)
HCT VFR BLD CALC: 41.8 % — SIGNIFICANT CHANGE UP (ref 39–50)
HGB BLD-MCNC: 14 G/DL — SIGNIFICANT CHANGE UP (ref 13–17)
MCHC RBC-ENTMCNC: 28.8 PG — SIGNIFICANT CHANGE UP (ref 27–34)
MCHC RBC-ENTMCNC: 33.5 GM/DL — SIGNIFICANT CHANGE UP (ref 32–36)
MCV RBC AUTO: 86 FL — SIGNIFICANT CHANGE UP (ref 80–100)
NRBC # BLD: 0 /100 WBCS — SIGNIFICANT CHANGE UP (ref 0–0)
PLATELET # BLD AUTO: 235 K/UL — SIGNIFICANT CHANGE UP (ref 150–400)
POTASSIUM SERPL-MCNC: 4.6 MMOL/L — SIGNIFICANT CHANGE UP (ref 3.5–5.3)
POTASSIUM SERPL-SCNC: 4.6 MMOL/L — SIGNIFICANT CHANGE UP (ref 3.5–5.3)
RBC # BLD: 4.86 M/UL — SIGNIFICANT CHANGE UP (ref 4.2–5.8)
RBC # FLD: 13.6 % — SIGNIFICANT CHANGE UP (ref 10.3–14.5)
SODIUM SERPL-SCNC: 138 MMOL/L — SIGNIFICANT CHANGE UP (ref 135–145)
TACROLIMUS SERPL-MCNC: 7.7 NG/ML — SIGNIFICANT CHANGE UP
WBC # BLD: 8.26 K/UL — SIGNIFICANT CHANGE UP (ref 3.8–10.5)
WBC # FLD AUTO: 8.26 K/UL — SIGNIFICANT CHANGE UP (ref 3.8–10.5)

## 2023-09-16 PROCEDURE — 84132 ASSAY OF SERUM POTASSIUM: CPT

## 2023-09-16 PROCEDURE — 0225U NFCT DS DNA&RNA 21 SARSCOV2: CPT

## 2023-09-16 PROCEDURE — 86777 TOXOPLASMA ANTIBODY: CPT

## 2023-09-16 PROCEDURE — 86665 EPSTEIN-BARR CAPSID VCA: CPT

## 2023-09-16 PROCEDURE — 85730 THROMBOPLASTIN TIME PARTIAL: CPT

## 2023-09-16 PROCEDURE — 81003 URINALYSIS AUTO W/O SCOPE: CPT

## 2023-09-16 PROCEDURE — 86663 EPSTEIN-BARR ANTIBODY: CPT

## 2023-09-16 PROCEDURE — 96374 THER/PROPH/DIAG INJ IV PUSH: CPT

## 2023-09-16 PROCEDURE — 84100 ASSAY OF PHOSPHORUS: CPT

## 2023-09-16 PROCEDURE — 87799 DETECT AGENT NOS DNA QUANT: CPT

## 2023-09-16 PROCEDURE — 83735 ASSAY OF MAGNESIUM: CPT

## 2023-09-16 PROCEDURE — 85018 HEMOGLOBIN: CPT

## 2023-09-16 PROCEDURE — 82947 ASSAY GLUCOSE BLOOD QUANT: CPT

## 2023-09-16 PROCEDURE — 71045 X-RAY EXAM CHEST 1 VIEW: CPT

## 2023-09-16 PROCEDURE — 80180 DRUG SCRN QUAN MYCOPHENOLATE: CPT

## 2023-09-16 PROCEDURE — 80048 BASIC METABOLIC PNL TOTAL CA: CPT

## 2023-09-16 PROCEDURE — 85025 COMPLETE CBC W/AUTO DIFF WBC: CPT

## 2023-09-16 PROCEDURE — 83605 ASSAY OF LACTIC ACID: CPT

## 2023-09-16 PROCEDURE — 87040 BLOOD CULTURE FOR BACTERIA: CPT

## 2023-09-16 PROCEDURE — 87899 AGENT NOS ASSAY W/OPTIC: CPT

## 2023-09-16 PROCEDURE — 80197 ASSAY OF TACROLIMUS: CPT

## 2023-09-16 PROCEDURE — 85027 COMPLETE CBC AUTOMATED: CPT

## 2023-09-16 PROCEDURE — 80053 COMPREHEN METABOLIC PANEL: CPT

## 2023-09-16 PROCEDURE — 86403 PARTICLE AGGLUT ANTBDY SCRN: CPT

## 2023-09-16 PROCEDURE — 99239 HOSP IP/OBS DSCHRG MGMT >30: CPT

## 2023-09-16 PROCEDURE — 87324 CLOSTRIDIUM AG IA: CPT

## 2023-09-16 PROCEDURE — 82330 ASSAY OF CALCIUM: CPT

## 2023-09-16 PROCEDURE — 99285 EMERGENCY DEPT VISIT HI MDM: CPT | Mod: 25

## 2023-09-16 PROCEDURE — 87507 IADNA-DNA/RNA PROBE TQ 12-25: CPT

## 2023-09-16 PROCEDURE — 85610 PROTHROMBIN TIME: CPT

## 2023-09-16 PROCEDURE — 87449 NOS EACH ORGANISM AG IA: CPT

## 2023-09-16 PROCEDURE — 86664 EPSTEIN-BARR NUCLEAR ANTIGEN: CPT

## 2023-09-16 PROCEDURE — 83036 HEMOGLOBIN GLYCOSYLATED A1C: CPT

## 2023-09-16 PROCEDURE — 82435 ASSAY OF BLOOD CHLORIDE: CPT

## 2023-09-16 PROCEDURE — 93306 TTE W/DOPPLER COMPLETE: CPT

## 2023-09-16 PROCEDURE — 82962 GLUCOSE BLOOD TEST: CPT

## 2023-09-16 PROCEDURE — 84295 ASSAY OF SERUM SODIUM: CPT

## 2023-09-16 PROCEDURE — 85014 HEMATOCRIT: CPT

## 2023-09-16 PROCEDURE — 36415 COLL VENOUS BLD VENIPUNCTURE: CPT

## 2023-09-16 PROCEDURE — 93356 MYOCRD STRAIN IMG SPCKL TRCK: CPT

## 2023-09-16 PROCEDURE — 82803 BLOOD GASES ANY COMBINATION: CPT

## 2023-09-16 PROCEDURE — 87086 URINE CULTURE/COLONY COUNT: CPT

## 2023-09-16 PROCEDURE — 86778 TOXOPLASMA ANTIBODY IGM: CPT

## 2023-09-16 RX ORDER — TACROLIMUS 5 MG/1
2 CAPSULE ORAL
Refills: 0 | DISCHARGE

## 2023-09-16 RX ADMIN — MAGNESIUM OXIDE 400 MG ORAL TABLET 400 MILLIGRAM(S): 241.3 TABLET ORAL at 12:07

## 2023-09-16 RX ADMIN — Medication 10 MILLIGRAM(S): at 05:45

## 2023-09-16 RX ADMIN — Medication 81 MILLIGRAM(S): at 12:07

## 2023-09-16 RX ADMIN — ENOXAPARIN SODIUM 40 MILLIGRAM(S): 100 INJECTION SUBCUTANEOUS at 09:35

## 2023-09-16 RX ADMIN — Medication 30 MILLIGRAM(S): at 05:45

## 2023-09-16 RX ADMIN — Medication 1 TABLET(S): at 12:07

## 2023-09-16 RX ADMIN — ATOVAQUONE 1500 MILLIGRAM(S): 750 SUSPENSION ORAL at 05:46

## 2023-09-16 RX ADMIN — TACROLIMUS 1.5 MILLIGRAM(S): 5 CAPSULE ORAL at 09:36

## 2023-09-16 RX ADMIN — MYCOPHENOLATE MOFETIL 1000 MILLIGRAM(S): 250 CAPSULE ORAL at 09:36

## 2023-09-16 NOTE — CHART NOTE - NSCHARTNOTEFT_GEN_A_CORE
Discussed case with primary team, HF, and GI. Plan to discharge the patient home and have his diarrhea evaluated by GI outpt.

## 2023-09-16 NOTE — DISCHARGE NOTE PROVIDER - ATTENDING DISCHARGE PHYSICAL EXAMINATION:
Patient seen and examined at bedside. Feels well overall. Still with diarrhea but overall improving. Cards team d/w GI - given chronic diarrhea, rec outpt follow up. Denies CP, SOB, n/v, abd pain, dysuria. Patient to follow up with GI, Cards, ID, and PCP.    General: NAD  HEENT: NC/AT; EOMI; MMM  Cards: RRR, S1/S2  Resp: CTA b/l; no wheezes  Abd: soft, NT/ND, normoactive bowel sounds   Extremities: moves all extremities   Neuro: A&Ox3; grossly nonfocal

## 2023-09-16 NOTE — DISCHARGE NOTE PROVIDER - NSDCMRMEDTOKEN_GEN_ALL_CORE_FT
Aspirin Enteric Coated 81 mg oral delayed release tablet: 1 tab(s) orally once a day  CellCept 250 mg oral capsule: 4 tab(s) orally 2 times a day  Farxiga 5 mg oral tablet: 1 tab(s) orally once a day  magnesium oxide 400 mg oral tablet: 1 tab(s) orally once a day  Mepron 750 mg/5 mL oral suspension: 10 milliliter(s) orally once a day  metFORMIN 500 mg oral tablet: 1 tab(s) orally once a day  Multiple Vitamins oral tablet: 1 tab(s) orally once a day  NIFEdipine 30 mg oral tablet, extended release: 1 tab(s) orally once a day  nystatin 100,000 units/mL oral suspension: 5 milliliter(s) orally 4 times a day  rosuvastatin 5 mg oral tablet: 1 tab(s) orally once a day  sildenafil 20 mg oral tablet: 0.5 tab(s) orally 2 times a day  tacrolimus 1 mg oral capsule: 1.5 tab(s) orally once a day at 9AM  tacrolimus 1 mg oral capsule: 2 tab(s) orally once a day at 9PM   Aspirin Enteric Coated 81 mg oral delayed release tablet: 1 tab(s) orally once a day  CellCept 250 mg oral capsule: 4 tab(s) orally 2 times a day  Farxiga 5 mg oral tablet: 1 tab(s) orally once a day  magnesium oxide 400 mg oral tablet: 1 tab(s) orally once a day  Mepron 750 mg/5 mL oral suspension: 10 milliliter(s) orally once a day  metFORMIN 500 mg oral tablet: 1 tab(s) orally once a day  Multiple Vitamins oral tablet: 1 tab(s) orally once a day  NIFEdipine 30 mg oral tablet, extended release: 1 tab(s) orally once a day  nystatin 100,000 units/mL oral suspension: 5 milliliter(s) orally 4 times a day  rosuvastatin 5 mg oral tablet: 1 tab(s) orally once a day  sildenafil 20 mg oral tablet: 0.5 tab(s) orally 2 times a day  tacrolimus 1 mg oral capsule: 1.5 tab(s) orally 2 times a day

## 2023-09-16 NOTE — DISCHARGE NOTE PROVIDER - CARE PROVIDER_API CALL
Vivian Nuñez  [facility]  [address] [place] [address]  Phone: [phone]  Fax: [phone]  Follow Up Time: 1 week

## 2023-09-16 NOTE — DISCHARGE NOTE NURSING/CASE MANAGEMENT/SOCIAL WORK - PATIENT PORTAL LINK FT
You can access the FollowMyHealth Patient Portal offered by [facility] by registering at the following website: [email or web address]. By joining [facility]’s FollowMyHealth portal, you will also be able to view your health information using other applications (apps) compatible with our system.

## 2023-09-16 NOTE — DISCHARGE NOTE PROVIDER - NSDCFUADDAPPT_GEN_ALL_CORE_FT
APPTS ARE READY TO BE MADE: [x ] YES    Best Family or Patient Contact (if needed):    Additional Information about above appointments (if needed):    1:   2:   3:     Other comments or requests:    APPTS ARE READY TO BE MADE: [x ] YES    Best Family or Patient Contact (if needed):    Additional Information about above appointments (if needed):    1: Gastro   2: CHF   3:     Other comments or requests:    APPTS ARE READY TO BE MADE: [X] YES    Best Family or Patient Contact (if needed):    Additional Information about above appointments (if needed):    1: GI follow up 1 week  2: CHF 1 week  3: ID follow up 1 week   4: PCP 1 week     Other comments or requests:    APPTS ARE READY TO BE MADE: [X] YES    Best Family or Patient Contact (if needed):    Additional Information about above appointments (if needed):    1: GI follow up 1 week  2: CHF 1 week  3: ID follow up 1 week   4: PCP 1 week     Other comments or requests:     Patient was previously scheduled to see Dr. Nuñez at 2:40PM on 10/4 at [address] [place] [address]

## 2023-09-16 NOTE — DISCHARGE NOTE PROVIDER - NSDCCPCAREPLAN_GEN_ALL_CORE_FT
PRINCIPAL DISCHARGE DIAGNOSIS  Diagnosis: Viral illness  Assessment and Plan of Treatment: Follow up with GI regarding chronic diarrhea      SECONDARY DISCHARGE DIAGNOSES  Diagnosis: HTN (hypertension)  Assessment and Plan of Treatment: Low salt diet  Activity as tolerated.  Take all medication as prescribed.  Follow up with your medical doctor for routine blood pressure monitoring at your next visit.  Notify your doctor if you have any of the following symptoms:   Dizziness, Lightheadedness, Blurry vision, Headache, Chest pain, Shortness of breath    Diagnosis: History of heart transplant  Assessment and Plan of Treatment: Follow up with CHF team in 1 week    Diagnosis: Chronic kidney disease, unspecified CKD stage  Assessment and Plan of Treatment: Follow up with Renal in 1-2 weeks

## 2023-09-16 NOTE — DISCHARGE NOTE PROVIDER - NSDCFUSCHEDAPPT_GEN_ALL_CORE_FT
[facility]  [facility] [address] [place] [address]  Scheduled Appointment: 11/16/2023     Vivian Nuñez  [facility] Physician [facility]  [facility] [address]  Scheduled Appointment: 10/04/2023    [facility]  [facility] Physician [address]  Scheduled Appointment: 11/16/2023

## 2023-09-16 NOTE — DISCHARGE NOTE NURSING/CASE MANAGEMENT/SOCIAL WORK - NSDCPEFALRISK_GEN_ALL_CORE
For information on Fall & Injury Prevention, visit: [email or web address] OR  [email or web address] OR  https://www.cdc.gov/steadi/patient.html

## 2023-09-16 NOTE — DISCHARGE NOTE NURSING/CASE MANAGEMENT/SOCIAL WORK - NSDCVIVACCINE_GEN_ALL_CORE_FT
HepB-CpG; 03-Jun-2022 06:31; Kelsy Barakat (RN); [employer] [id number] (Exp. Date: 11-Mar-2023); IntraMuscular; Deltoid Right.; 20 MICROGram(s); VIS (VIS Published: 15-Oct-2021, VIS Presented: 03-Jun-2022);   influenza, injectable, quadrivalent, preservative free; 10-Gus-2022 06:03; Sachi Nicholson); [employer]; [id number] (Exp. Date: 30-Jun-2022); IntraMuscular; Deltoid Right.; 0.5 milliLiter(s); VIS (VIS Published: 06-Aug-2021, VIS Presented: 10-Gus-2022);

## 2023-09-16 NOTE — DISCHARGE NOTE PROVIDER - HOSPITAL COURSE
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       Prior Cardiac Studies  3/15/23 TTE EF 67% LVIDd 4.2cm, LVIDd 4.2cm, normal LVSF, normal RV function with decreased RVSF, mild TR, no pericardial effusion. Compared to TTE 1/4/23, RV size has improved, there is no change in RV function.  ?1/4/23 TTE EF 64% LVIDd 4.5cm, normal LVSF, Global strain -20%, decreased RV systolic function, no valvular abnormalities, no pericardial effusion. Copmared to TTE 11/30/22, no significant changes noted.  ?11/30/22 TTE EF 58% LVIDd 2.6, normal LFSV, decreased RV systolic function, RV wall strain -12.1%, no valvular abnormalities, no pericardial effusion  ?10/20/22 TTE limited: normal graft function. No pericardial effusion seen.  ?TTE 8/2/22: limited study in the setting of an RV biopsy  1. Normal right ventricular size and function.  2. Small pericardiall effusion.  ?A bioptome is noted in the right heart. No change to the size of the pericardial effusion or RV function  ?7/20/22: TTE: limited study: hyperdynamic LVSF, normal RV size and function, normal pericardium with trace to small pericardial effusion adjacent to RV  ?07/05/22 TTE: limited study: normal biV function, small loculated pericardial effusion at the LV apex, measuring approx 1.0 cm seen both pre and post images.        ·  Problem: Febrile illness.   ·  Plan: RVP positive for coronavirus, not covid. Now having diarrhea again.     Recommendations:  - No growth on blood Cultures. UA negative. CXR clear lung fields  - Infectious studies so far negative, except RVP with coronavirus (not covid) and CMV PCR detected but below threshold.   - TxID consult.  Continue supportive care.    ·  Problem: Diarrhea.   ·  Plan: chronic,   Recommend GI consult, Stool O&P, microsporidia smear.  GI will follow up as outpt.      ·  Problem: History of heart transplant.   ·  Plan: EMB biopsy from June with ISHLT Grade 2R/3A treated with steroid pulse  On Sildenafil 10mg TID for pHTN pre-transplant  TTE 6/21/23: LVEF 60%, G1DD, reduced RV systolic function. Global longitudinal strain is -17.6 %  -repeat TTE with normal biventricular fxn  OK to DC home.  We will arrange for post hospitalization follow up visit next week.      ·  Problem: Immunosuppression.   ·  Plan: -Tacro decrease to 1.5mg BID trough goal 8-10  -Increase Cellcept to 1000mg BID.      ·  Problem: Need for prophylactic measure.   ·  Plan: OK to stop Mepron.   Had h/o hyperK and JAGRUTI.      ·  Problem: Chronic kidney disease, unspecified CKD stage.   ·  Plan: Create baseline. CTM.    ·  Problem: HTN (hypertension).   ·  Plan: Cont. home nifedipine.      ·  Problem: Cytomegalovirus (CMV) viremia.   ·  Plan: Remains detectable but below threshold  Will continue weekly CMV levels.    Pt feels well except for loose stools which is a chronic issue.  Fever likely due to recent cold.    Remains afebrile with unremarkable labs except for +CMV.   GI to see patient. Will decide discharge based on GI's assessment. Otherwise, ok to DC home from heart tx standpoint.  Plan to decrease tacrolimus to 1.5mg BID, increase cellcept to 1000mg BID and STOP Mepron.   Will need weekly CMV titers.   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       Prior Cardiac Studies  3/15/23 TTE EF 67% LVIDd 4.2cm, LVIDd 4.2cm, normal LVSF, normal RV function with decreased RVSF, mild TR, no pericardial effusion. Compared to TTE 1/4/23, RV size has improved, there is no change in RV function.  ?1/4/23 TTE EF 64% LVIDd 4.5cm, normal LVSF, Global strain -20%, decreased RV systolic function, no valvular abnormalities, no pericardial effusion. Copmared to TTE 11/30/22, no significant changes noted.  ?11/30/22 TTE EF 58% LVIDd 2.6, normal LFSV, decreased RV systolic function, RV wall strain -12.1%, no valvular abnormalities, no pericardial effusion  ?10/20/22 TTE limited: normal graft function. No pericardial effusion seen.  ?TTE 8/2/22: limited study in the setting of an RV biopsy  1. Normal right ventricular size and function.  2. Small pericardiall effusion.  ?A bioptome is noted in the right heart. No change to the size of the pericardial effusion or RV function  ?7/20/22: TTE: limited study: hyperdynamic LVSF, normal RV size and function, normal pericardium with trace to small pericardial effusion adjacent to RV  ?07/05/22 TTE: limited study: normal biV function, small loculated pericardial effusion at the LV apex, measuring approx 1.0 cm seen both pre and post images.        ·  Problem: Febrile illness.   ·  Plan: RVP positive for coronavirus, not covid. Now having diarrhea again.     Recommendations:  - No growth on blood Cultures. UA negative. CXR clear lung fields  - Infectious studies so far negative, except RVP with coronavirus (not covid) and CMV PCR detected but below threshold.   - TxID consult.  Continue supportive care.    ·  Problem: Diarrhea.   ·  Plan: chronic,   Recommend GI consult, Stool O&P, microsporidia smear.  GI will follow up as outpt.      ·  Problem: History of heart transplant.   ·  Plan: EMB biopsy from June with ISHLT Grade 2R/3A treated with steroid pulse  On Sildenafil 10mg TID for pHTN pre-transplant  TTE 6/21/23: LVEF 60%, G1DD, reduced RV systolic function. Global longitudinal strain is -17.6 %  -repeat TTE with normal biventricular fxn  OK to DC home.  We will arrange for post hospitalization follow up visit next week.      ·  Problem: Immunosuppression.   ·  Plan: -Tacro decrease to 1.5mg BID trough goal 8-10  -Increase Cellcept to 1000mg BID.        ·  Problem: Chronic kidney disease, unspecified CKD stage.   ·  Plan: Create baseline. CTM.    ·  Problem: HTN (hypertension).   ·  Plan: Cont. home nifedipine.      ·  Problem: Cytomegalovirus (CMV) viremia.   ·  Plan: Remains detectable but below threshold  weekly CMV levels.    Pt feels well except for loose stools which is a chronic issue.  Fever likely due to recent cold.    Remains afebrile with unremarkable labs except for +CMV.   GI team reviewed case; rec outpatient followup for chronic diarrhea; no inpt w/u recommended at this time. Otherwise, ok to DC home from heart tx standpoint.  Plan to decrease tacrolimus to 1.5mg BID, increase cellcept to 1000mg BID and STOP Mepron.   Will need weekly CMV titers.

## 2023-09-16 NOTE — DISCHARGE NOTE PROVIDER - DETAILS OF MALNUTRITION DIAGNOSIS/DIAGNOSES
This patient has been assessed with a concern for Malnutrition and was treated during this hospitalization for the following Nutrition diagnosis/diagnoses:     -  09/15/2023: Severe protein-calorie malnutrition

## 2023-09-16 NOTE — DISCHARGE NOTE NURSING/CASE MANAGEMENT/SOCIAL WORK - NSDCFUADDAPPT_GEN_ALL_CORE_FT
APPTS ARE READY TO BE MADE: [x ] YES    Best Family or Patient Contact (if needed):    Additional Information about above appointments (if needed):    1: Gastro   2: CHF   3:     Other comments or requests:

## 2023-09-18 PROBLEM — Z94.1 HEART TRANSPLANT STATUS: Chronic | Status: ACTIVE | Noted: 2023-09-13

## 2023-09-18 LAB
CULTURE RESULTS: SIGNIFICANT CHANGE UP
CULTURE RESULTS: SIGNIFICANT CHANGE UP
SPECIMEN SOURCE: SIGNIFICANT CHANGE UP
SPECIMEN SOURCE: SIGNIFICANT CHANGE UP

## 2023-09-18 RX ORDER — ATOVAQUONE 750 MG/5ML
750 SUSPENSION ORAL DAILY
Qty: 1 | Refills: 5 | Status: COMPLETED | COMMUNITY
Start: 2023-06-29 | End: 2023-09-15

## 2023-09-20 LAB
MYCOPHENOLATE SERPL-MCNC: 6.5 UG/ML — HIGH (ref 1–3.5)
MYCOPHENOLIC ACID GLUCURONIDE: 55 UG/ML — SIGNIFICANT CHANGE UP (ref 15–125)

## 2023-09-25 ENCOUNTER — RX RENEWAL (OUTPATIENT)
Age: 68
End: 2023-09-25

## 2023-09-28 LAB
ALBUMIN SERPL ELPH-MCNC: 4.7 G/DL
ALP BLD-CCNC: 98 U/L
ALT SERPL-CCNC: 16 U/L
ANION GAP SERPL CALC-SCNC: 12 MMOL/L
AST SERPL-CCNC: 15 U/L
BASOPHILS # BLD AUTO: 0.06 K/UL
BASOPHILS NFR BLD AUTO: 0.7 %
BILIRUB SERPL-MCNC: 1.3 MG/DL
BUN SERPL-MCNC: 13 MG/DL
CALCIUM SERPL-MCNC: 10.1 MG/DL
CHLORIDE SERPL-SCNC: 104 MMOL/L
CMV DNA SPEC QL NAA+PROBE: NOT DETECTED IU/ML
CMVPCR LOG: NOT DETECTED LOG10IU/ML
CO2 SERPL-SCNC: 23 MMOL/L
CREAT SERPL-MCNC: 1.1 MG/DL
EGFR: 74 ML/MIN/1.73M2
EOSINOPHIL # BLD AUTO: 0.32 K/UL
EOSINOPHIL NFR BLD AUTO: 3.7 %
GLUCOSE SERPL-MCNC: 103 MG/DL
HCT VFR BLD CALC: 43.8 %
HGB BLD-MCNC: 14.6 G/DL
IMM GRANULOCYTES NFR BLD AUTO: 0.5 %
LYMPHOCYTES # BLD AUTO: 2.21 K/UL
LYMPHOCYTES NFR BLD AUTO: 25.8 %
MAGNESIUM SERPL-MCNC: 1.7 MG/DL
MAN DIFF?: NORMAL
MCHC RBC-ENTMCNC: 28.9 PG
MCHC RBC-ENTMCNC: 33.3 GM/DL
MCV RBC AUTO: 86.7 FL
MONOCYTES # BLD AUTO: 0.57 K/UL
MONOCYTES NFR BLD AUTO: 6.6 %
NEUTROPHILS # BLD AUTO: 5.38 K/UL
NEUTROPHILS NFR BLD AUTO: 62.7 %
PLATELET # BLD AUTO: 259 K/UL
POTASSIUM SERPL-SCNC: 4.9 MMOL/L
PROT SERPL-MCNC: 7 G/DL
RBC # BLD: 5.05 M/UL
RBC # FLD: 14 %
SODIUM SERPL-SCNC: 139 MMOL/L
TACROLIMUS SERPL-MCNC: 8.8 NG/ML
WBC # FLD AUTO: 8.58 K/UL

## 2023-10-03 ENCOUNTER — APPOINTMENT (OUTPATIENT)
Dept: GASTROENTEROLOGY | Facility: CLINIC | Age: 68
End: 2023-10-03
Payer: MEDICARE

## 2023-10-03 VITALS
SYSTOLIC BLOOD PRESSURE: 101 MMHG | TEMPERATURE: 97.9 F | HEART RATE: 109 BPM | BODY MASS INDEX: 18.9 KG/M2 | RESPIRATION RATE: 17 BRPM | HEIGHT: 70 IN | DIASTOLIC BLOOD PRESSURE: 71 MMHG | OXYGEN SATURATION: 98 % | WEIGHT: 132 LBS

## 2023-10-03 PROCEDURE — 99214 OFFICE O/P EST MOD 30 MIN: CPT

## 2023-10-04 ENCOUNTER — OUTPATIENT (OUTPATIENT)
Dept: OUTPATIENT SERVICES | Facility: HOSPITAL | Age: 68
LOS: 1 days | End: 2023-10-04
Payer: MEDICARE

## 2023-10-04 ENCOUNTER — APPOINTMENT (OUTPATIENT)
Dept: HEART FAILURE | Facility: CLINIC | Age: 68
End: 2023-10-04
Payer: MEDICARE

## 2023-10-04 ENCOUNTER — APPOINTMENT (OUTPATIENT)
Dept: CV DIAGNOSITCS | Facility: HOSPITAL | Age: 68
End: 2023-10-04

## 2023-10-04 VITALS
BODY MASS INDEX: 19.04 KG/M2 | WEIGHT: 133 LBS | HEIGHT: 70 IN | HEART RATE: 113 BPM | SYSTOLIC BLOOD PRESSURE: 109 MMHG | TEMPERATURE: 98 F | OXYGEN SATURATION: 100 % | DIASTOLIC BLOOD PRESSURE: 77 MMHG

## 2023-10-04 DIAGNOSIS — Z98.890 OTHER SPECIFIED POSTPROCEDURAL STATES: Chronic | ICD-10-CM

## 2023-10-04 DIAGNOSIS — Z95.0 PRESENCE OF CARDIAC PACEMAKER: Chronic | ICD-10-CM

## 2023-10-04 DIAGNOSIS — Z94.1 HEART TRANSPLANT STATUS: ICD-10-CM

## 2023-10-04 PROCEDURE — 93306 TTE W/DOPPLER COMPLETE: CPT

## 2023-10-04 PROCEDURE — 99215 OFFICE O/P EST HI 40 MIN: CPT

## 2023-10-04 PROCEDURE — 93356 MYOCRD STRAIN IMG SPCKL TRCK: CPT | Mod: 26

## 2023-10-04 PROCEDURE — 93356 MYOCRD STRAIN IMG SPCKL TRCK: CPT

## 2023-10-04 PROCEDURE — 76376 3D RENDER W/INTRP POSTPROCES: CPT | Mod: 26

## 2023-10-04 PROCEDURE — 76376 3D RENDER W/INTRP POSTPROCES: CPT

## 2023-10-04 PROCEDURE — 93306 TTE W/DOPPLER COMPLETE: CPT | Mod: 26

## 2023-10-04 RX ORDER — TACROLIMUS 1 MG/1
1 CAPSULE ORAL
Qty: 60 | Refills: 5 | Status: DISCONTINUED | COMMUNITY
Start: 2022-06-29 | End: 2023-10-04

## 2023-10-04 RX ORDER — TACROLIMUS 0.75 MG/1
0.75 TABLET, EXTENDED RELEASE ORAL
Qty: 30 | Refills: 5 | Status: DISCONTINUED | COMMUNITY
Start: 2023-09-11 | End: 2023-10-04

## 2023-10-04 RX ORDER — TACROLIMUS 0.5 MG/1
0.5 CAPSULE ORAL
Qty: 60 | Refills: 5 | Status: DISCONTINUED | COMMUNITY
Start: 2022-11-16 | End: 2023-10-04

## 2023-10-10 LAB
ALBUMIN SERPL ELPH-MCNC: 5 G/DL
ALP BLD-CCNC: 105 U/L
ALT SERPL-CCNC: 17 U/L
ANION GAP SERPL CALC-SCNC: 12 MMOL/L
AST SERPL-CCNC: 20 U/L
BASOPHILS # BLD AUTO: 0.05 K/UL
BASOPHILS NFR BLD AUTO: 0.8 %
BILIRUB SERPL-MCNC: 1 MG/DL
BUN SERPL-MCNC: 17 MG/DL
CALCIUM SERPL-MCNC: 10.2 MG/DL
CHLORIDE SERPL-SCNC: 103 MMOL/L
CO2 SERPL-SCNC: 26 MMOL/L
CREAT SERPL-MCNC: 1.12 MG/DL
EGFR: 73 ML/MIN/1.73M2
EOSINOPHIL # BLD AUTO: 0.29 K/UL
EOSINOPHIL NFR BLD AUTO: 4.5 %
GLUCOSE SERPL-MCNC: 108 MG/DL
HCT VFR BLD CALC: 42.9 %
HGB BLD-MCNC: 14 G/DL
IMM GRANULOCYTES NFR BLD AUTO: 0.3 %
LYMPHOCYTES # BLD AUTO: 1.87 K/UL
LYMPHOCYTES NFR BLD AUTO: 28.9 %
MAGNESIUM SERPL-MCNC: 1.7 MG/DL
MAN DIFF?: NORMAL
MCHC RBC-ENTMCNC: 28.6 PG
MCHC RBC-ENTMCNC: 32.6 GM/DL
MCV RBC AUTO: 87.6 FL
MONOCYTES # BLD AUTO: 0.59 K/UL
MONOCYTES NFR BLD AUTO: 9.1 %
NEUTROPHILS # BLD AUTO: 3.64 K/UL
NEUTROPHILS NFR BLD AUTO: 56.4 %
PLATELET # BLD AUTO: 223 K/UL
POTASSIUM SERPL-SCNC: 4.5 MMOL/L
PROT SERPL-MCNC: 7.2 G/DL
RBC # BLD: 4.9 M/UL
RBC # FLD: 14.1 %
SODIUM SERPL-SCNC: 141 MMOL/L
TACROLIMUS SERPL-MCNC: 5.6 NG/ML
WBC # FLD AUTO: 6.46 K/UL

## 2023-10-13 ENCOUNTER — NON-APPOINTMENT (OUTPATIENT)
Age: 68
End: 2023-10-13

## 2023-10-16 ENCOUNTER — NON-APPOINTMENT (OUTPATIENT)
Age: 68
End: 2023-10-16

## 2023-10-17 LAB
ALBUMIN SERPL ELPH-MCNC: 4.6 G/DL
ALP BLD-CCNC: 96 U/L
ALT SERPL-CCNC: 22 U/L
ANION GAP SERPL CALC-SCNC: 11 MMOL/L
AST SERPL-CCNC: 21 U/L
BASOPHILS # BLD AUTO: 0.05 K/UL
BASOPHILS NFR BLD AUTO: 0.7 %
BILIRUB SERPL-MCNC: 0.8 MG/DL
BUN SERPL-MCNC: 21 MG/DL
CALCIUM SERPL-MCNC: 10.1 MG/DL
CALPROTECTIN FECAL: 28 UG/G
CHLORIDE SERPL-SCNC: 103 MMOL/L
CO2 SERPL-SCNC: 25 MMOL/L
CREAT SERPL-MCNC: 1.2 MG/DL
EGFR: 67 ML/MIN/1.73M2
EOSINOPHIL # BLD AUTO: 0.17 K/UL
EOSINOPHIL NFR BLD AUTO: 2.3 %
GLUCOSE SERPL-MCNC: 101 MG/DL
HCT VFR BLD CALC: 44.1 %
HEMOCCULT STL QL IA: NEGATIVE
HGB BLD-MCNC: 14.1 G/DL
IMM GRANULOCYTES NFR BLD AUTO: 0.4 %
LYMPHOCYTES # BLD AUTO: 2.12 K/UL
LYMPHOCYTES NFR BLD AUTO: 28.6 %
MAGNESIUM SERPL-MCNC: 1.7 MG/DL
MAN DIFF?: NORMAL
MCHC RBC-ENTMCNC: 28.3 PG
MCHC RBC-ENTMCNC: 32 GM/DL
MCV RBC AUTO: 88.6 FL
MONOCYTES # BLD AUTO: 0.66 K/UL
MONOCYTES NFR BLD AUTO: 8.9 %
NEUTROPHILS # BLD AUTO: 4.37 K/UL
NEUTROPHILS NFR BLD AUTO: 59.1 %
PLATELET # BLD AUTO: 258 K/UL
POTASSIUM SERPL-SCNC: 4.6 MMOL/L
PROT SERPL-MCNC: 6.7 G/DL
RBC # BLD: 4.98 M/UL
RBC # FLD: 14.1 %
SODIUM SERPL-SCNC: 138 MMOL/L
TACROLIMUS SERPL-MCNC: 8.4 NG/ML
WBC # FLD AUTO: 7.4 K/UL

## 2023-10-19 LAB
CMV DNA SPEC QL NAA+PROBE: ABNORMAL IU/ML
CMVPCR LOG: ABNORMAL LOG10IU/ML

## 2023-10-20 NOTE — ASU PATIENT PROFILE, ADULT - VISION (WITH CORRECTIVE LENSES IF THE PATIENT USUALLY WEARS THEM):
Procedure:  COLONOSCOPY    Relevant Problems   CARDIO   (+) Hypertension   (+) Mixed hyperlipidemia      ENDO   (+) Hypothyroidism (acquired)      /RENAL   (+) Kidney congenitally absent, left      MUSCULOSKELETAL   (+) Chronic bilateral low back pain without sciatica      NEURO/PSYCH   (+) Chronic bilateral low back pain without sciatica      PULMONARY   (+) Asthma        Physical Exam    Airway    Mallampati score: II  TM Distance: <3 FB  Neck ROM: full     Dental   No notable dental hx     Cardiovascular  Cardiovascular exam normal    Pulmonary  Pulmonary exam normal     Other Findings      Anesthesia Plan  ASA Score- 3     Anesthesia Type- IV sedation with anesthesia with ASA Monitors. Additional Monitors:     Airway Plan:            Plan Factors-Exercise tolerance (METS): >4 METS. Chart reviewed. Existing labs reviewed. Patient is not a current smoker. Patient not instructed to abstain from smoking on day of procedure. Patient did not smoke on day of surgery. Induction- intravenous. Postoperative Plan-     Informed Consent- Anesthetic plan and risks discussed with patient. I personally reviewed this patient with the CRNA. Discussed and agreed on the Anesthesia Plan with the CRNA. Jamir Pichardo Normal vision: sees adequately in most situations; can see medication labels, newsprint

## 2023-10-26 ENCOUNTER — APPOINTMENT (OUTPATIENT)
Dept: GASTROENTEROLOGY | Facility: CLINIC | Age: 68
End: 2023-10-26

## 2023-11-07 ENCOUNTER — APPOINTMENT (OUTPATIENT)
Dept: GASTROENTEROLOGY | Facility: CLINIC | Age: 68
End: 2023-11-07
Payer: MEDICARE

## 2023-11-07 ENCOUNTER — RX RENEWAL (OUTPATIENT)
Age: 68
End: 2023-11-07

## 2023-11-07 VITALS
SYSTOLIC BLOOD PRESSURE: 94 MMHG | HEIGHT: 70 IN | OXYGEN SATURATION: 100 % | WEIGHT: 136 LBS | BODY MASS INDEX: 19.47 KG/M2 | DIASTOLIC BLOOD PRESSURE: 66 MMHG | HEART RATE: 108 BPM

## 2023-11-07 DIAGNOSIS — Z86.79 PERSONAL HISTORY OF OTHER DISEASES OF THE CIRCULATORY SYSTEM: ICD-10-CM

## 2023-11-07 DIAGNOSIS — E13.9 OTHER SPECIFIED DIABETES MELLITUS W/OUT COMPLICATIONS: ICD-10-CM

## 2023-11-07 DIAGNOSIS — K21.9 GASTRO-ESOPHAGEAL REFLUX DISEASE W/OUT ESOPHAGITIS: ICD-10-CM

## 2023-11-07 PROCEDURE — 99214 OFFICE O/P EST MOD 30 MIN: CPT

## 2023-11-17 ENCOUNTER — NON-APPOINTMENT (OUTPATIENT)
Age: 68
End: 2023-11-17

## 2023-11-17 LAB
ALBUMIN SERPL ELPH-MCNC: 4.7 G/DL
ALP BLD-CCNC: 120 U/L
ALT SERPL-CCNC: 20 U/L
ANION GAP SERPL CALC-SCNC: 15 MMOL/L
AST SERPL-CCNC: 16 U/L
BASOPHILS # BLD AUTO: 0.04 K/UL
BASOPHILS NFR BLD AUTO: 0.6 %
BILIRUB SERPL-MCNC: 0.7 MG/DL
BUN SERPL-MCNC: 19 MG/DL
CALCIUM SERPL-MCNC: 10.1 MG/DL
CHLORIDE SERPL-SCNC: 106 MMOL/L
CO2 SERPL-SCNC: 21 MMOL/L
CREAT SERPL-MCNC: 1.07 MG/DL
EGFR: 77 ML/MIN/1.73M2
EOSINOPHIL # BLD AUTO: 0.11 K/UL
EOSINOPHIL NFR BLD AUTO: 1.6 %
GLUCOSE SERPL-MCNC: 113 MG/DL
HCT VFR BLD CALC: 43.4 %
HGB BLD-MCNC: 14.1 G/DL
IMM GRANULOCYTES NFR BLD AUTO: 0.1 %
LYMPHOCYTES # BLD AUTO: 2.55 K/UL
LYMPHOCYTES NFR BLD AUTO: 36.2 %
MAGNESIUM SERPL-MCNC: 1.7 MG/DL
MAN DIFF?: NORMAL
MCHC RBC-ENTMCNC: 29.2 PG
MCHC RBC-ENTMCNC: 32.5 GM/DL
MCV RBC AUTO: 89.9 FL
MONOCYTES # BLD AUTO: 0.48 K/UL
MONOCYTES NFR BLD AUTO: 6.8 %
NEUTROPHILS # BLD AUTO: 3.85 K/UL
NEUTROPHILS NFR BLD AUTO: 54.7 %
PLATELET # BLD AUTO: 246 K/UL
POTASSIUM SERPL-SCNC: 4.7 MMOL/L
PROT SERPL-MCNC: 7.1 G/DL
RBC # BLD: 4.83 M/UL
RBC # FLD: 14 %
SODIUM SERPL-SCNC: 141 MMOL/L
TACROLIMUS SERPL-MCNC: 9.7 NG/ML
WBC # FLD AUTO: 7.04 K/UL

## 2023-11-20 LAB
CMV DNA SPEC QL NAA+PROBE: NOT DETECTED IU/ML
CMVPCR LOG: NOT DETECTED LOG10IU/ML

## 2023-12-01 LAB
ALBUMIN SERPL ELPH-MCNC: 5.1 G/DL
ALP BLD-CCNC: 134 U/L
ALT SERPL-CCNC: 18 U/L
ANION GAP SERPL CALC-SCNC: 12 MMOL/L
AST SERPL-CCNC: 17 U/L
BASOPHILS # BLD AUTO: 0.06 K/UL
BASOPHILS NFR BLD AUTO: 0.8 %
BILIRUB SERPL-MCNC: 1 MG/DL
BUN SERPL-MCNC: 20 MG/DL
CALCIUM SERPL-MCNC: 10.3 MG/DL
CHLORIDE SERPL-SCNC: 103 MMOL/L
CO2 SERPL-SCNC: 24 MMOL/L
CREAT SERPL-MCNC: 1.23 MG/DL
EGFR: 65 ML/MIN/1.73M2
EOSINOPHIL # BLD AUTO: 0.13 K/UL
EOSINOPHIL NFR BLD AUTO: 1.8 %
GLUCOSE SERPL-MCNC: 118 MG/DL
HCT VFR BLD CALC: 49.1 %
HGB BLD-MCNC: 15.8 G/DL
HIV1 RNA # SERPL NAA+PROBE: NORMAL
HIV1 RNA # SERPL NAA+PROBE: NORMAL COPIES/ML
HIV1+2 AB SPEC QL IA.RAPID: NONREACTIVE
IMM GRANULOCYTES NFR BLD AUTO: 0.3 %
LYMPHOCYTES # BLD AUTO: 2.39 K/UL
LYMPHOCYTES NFR BLD AUTO: 33 %
MAGNESIUM SERPL-MCNC: 2 MG/DL
MAN DIFF?: NORMAL
MCHC RBC-ENTMCNC: 28.8 PG
MCHC RBC-ENTMCNC: 32.2 GM/DL
MCV RBC AUTO: 89.4 FL
MONOCYTES # BLD AUTO: 0.53 K/UL
MONOCYTES NFR BLD AUTO: 7.3 %
NEUTROPHILS # BLD AUTO: 4.12 K/UL
NEUTROPHILS NFR BLD AUTO: 56.8 %
PLATELET # BLD AUTO: 243 K/UL
POTASSIUM SERPL-SCNC: 4.7 MMOL/L
PROT SERPL-MCNC: 7.4 G/DL
RBC # BLD: 5.49 M/UL
RBC # FLD: 13.9 %
SODIUM SERPL-SCNC: 139 MMOL/L
TACROLIMUS SERPL-MCNC: 7.1 NG/ML
VIRAL LOAD INTERP: NORMAL
VIRAL LOAD LOG: NORMAL LG COP/ML
WBC # FLD AUTO: 7.25 K/UL

## 2023-12-08 ENCOUNTER — RX RENEWAL (OUTPATIENT)
Age: 68
End: 2023-12-08

## 2023-12-22 LAB
ALBUMIN SERPL ELPH-MCNC: 4.8 G/DL
ALP BLD-CCNC: 135 U/L
ALT SERPL-CCNC: 15 U/L
ANION GAP SERPL CALC-SCNC: 13 MMOL/L
AST SERPL-CCNC: 15 U/L
BASOPHILS # BLD AUTO: 0.07 K/UL
BASOPHILS NFR BLD AUTO: 0.9 %
BILIRUB SERPL-MCNC: 0.6 MG/DL
BUN SERPL-MCNC: 20 MG/DL
CALCIUM SERPL-MCNC: 10.1 MG/DL
CHLORIDE SERPL-SCNC: 102 MMOL/L
CMV DNA SPEC QL NAA+PROBE: NOT DETECTED IU/ML
CMVPCR LOG: NOT DETECTED LOG10IU/ML
CO2 SERPL-SCNC: 25 MMOL/L
CREAT SERPL-MCNC: 1.24 MG/DL
EGFR: 64 ML/MIN/1.73M2
EOSINOPHIL # BLD AUTO: 0.14 K/UL
EOSINOPHIL NFR BLD AUTO: 1.7 %
GLUCOSE SERPL-MCNC: 117 MG/DL
HCT VFR BLD CALC: 48.5 %
HGB BLD-MCNC: 15.9 G/DL
IMM GRANULOCYTES NFR BLD AUTO: 0.1 %
LYMPHOCYTES # BLD AUTO: 3.23 K/UL
LYMPHOCYTES NFR BLD AUTO: 40.1 %
MAGNESIUM SERPL-MCNC: 1.9 MG/DL
MAN DIFF?: NORMAL
MCHC RBC-ENTMCNC: 28.4 PG
MCHC RBC-ENTMCNC: 32.8 GM/DL
MCV RBC AUTO: 86.8 FL
MONOCYTES # BLD AUTO: 0.5 K/UL
MONOCYTES NFR BLD AUTO: 6.2 %
NEUTROPHILS # BLD AUTO: 4.11 K/UL
NEUTROPHILS NFR BLD AUTO: 51 %
PLATELET # BLD AUTO: 249 K/UL
POTASSIUM SERPL-SCNC: 4.6 MMOL/L
PROT SERPL-MCNC: 7.4 G/DL
RBC # BLD: 5.59 M/UL
RBC # FLD: 13.2 %
SODIUM SERPL-SCNC: 140 MMOL/L
TACROLIMUS SERPL-MCNC: 5.5 NG/ML
WBC # FLD AUTO: 8.06 K/UL

## 2023-12-24 NOTE — ASU PREOP CHECKLIST - NSBLOODTRANS_GEN_A_CORE_SIUH
[facility] - [facility]     PROGRESS NOTE    Patient: Sandor Harris Date: 12/24/2023   male, 60 year old  Admit Date: 12/20/2023   Attending: Sergio Pierce MD Primary Care Physician: Moriah Huber DO     ICU admission Date: 12/20/2023     Operation: CABGx3, AVR    Post-operative Day: 0                                                                 SUMMARY OF PLAN        1. Multivessel CAD, severe AS s/p CABG and AVR: maintain MAP > 65 mmHg, CI > 2.0. Titration of vasoactive and inotropic infusions per CV surgery. Trend serum lactic acid. Monitor chest tube, urine output closely. Resume ASA, statin when extubated and stable. Start metoprolol when free of inotropic infusions. Defer decision regarding anticoagulation to CV surgery. Pain control.      2. Acute postoperative pulmonary insufficiency, expected: if chest tube output and hemodynamics remain acceptable, discontinue sedation, transition to PSV in anticipation of extubation. Serial ABGs, wean settings. Verify stable neurologic examination and airway reflexes. Extubate. Mobilize aggressively. Encourage frequent use of IS, deep breathing exercises. Duonebs around the clock. Wean supplemental oxygen to maintain SpO2 > 92%.      3. HFrEF: resume GDMT when extubated and stable. Lifevest eval if LVEF does not recover for primary prevention. Diuresis.       4. PVD: resume DAPT when cleared by CVS      5. DM/gycemic control: insulin gtt to maintain serum blood glucose < 180.    -----------------------------  Best Practice:  - VTE: SCDs only, holding chemical DVT Ppx given risk of bleeding  - SUP: PPI (proton pump inhibitor)  - Glycemic control: insulin drip  - LDA assessment and Removal:       Continue A-line, Central venous line, Pulmonary arterial catheter, and [place]      Remove  none  - Nutrition: NPO for now  - Last BM:1 (12/24/23 3175)  - Therapy/mobilization: Daily PT/OT when extubated    ACTIVE PROBLEM LIST  See Below    Disposition:  ICU    CODE STATUS: Full code     The patient is a 60 year old male with a past medical history significant for HTN, HLD, CAD, DM2, PVD s/p bilateral sequential CEA, and GERD who presented with chief complaint of progressively worsening shortness of breath. Workup revealed LV dysfunction, severe multivessel CAD, and low-flow, low-gradient aortic stenosis. The patient was admitted, diuresed, and maintained on a milrinone infusion. Dr. Pierce was consulted and he underwent CABGx3, AVR on 12/24/23. The procedure was uncomplicated. He presented to the ICU intubated, sedated, on 5 mcg/kg/min dobutamine for inotropic support.    Interval Events: as above.    Subjective: Patient seen and examined. Intubated, sedated.    Labs/Imaging: Reviewed. Adequate gas exchange. Hgb 8.1.    REVIEW OF SYSTEMS  Review of Systems   Unable to perform ROS: Intubated        EXAM  Physical Exam  Constitutional:       Appearance: Normal appearance.      Comments: sedated   HENT:      Head: Normocephalic and atraumatic.   Eyes:      Pupils: Pupils are equal, round, and reactive to light.   Cardiovascular:      Rate and Rhythm: Normal rate and regular rhythm.      Pulses: Normal pulses.   Pulmonary:      Breath sounds: No wheezing.      Comments: intubated  Abdominal:      Palpations: Abdomen is soft.      Tenderness: There is no abdominal tenderness.   Skin:     General: Skin is warm and dry.   Neurological:      Comments: Sedated, not following commands, moves all four extremities with equal strength         NEURO:   # Analgesia: per CV surgery    # Sedation: dexmedetomidine, fentanyl gtts  - Goal RASS 0 to -1  - Daily SAT if unable to extubate    CV:  - Infusions: dobutamine 5 mcg/kg/min  - Titration goals: CI >/= 2, MAP 65-80 mmHg    # CAD    # HFrEF    # HTN    # HLD    # Severe aortic stenosis    # Bilateral carotid stenosis s/p CEA    # PVD    Sinus rhythm  (!) 104 Pulse  Min: 98  Max: 108   Blood Pressure 93/66 BP  Min: 93/66  Max: 114/62    Art BP   No data recorded   CVP   No data recorded     SvO2   No data recorded   Cardiac Output   No data recorded   Cardiac index   No data recorded       PULM:  # Acute post-operative pulmonary insufficiency, expected    - ABG:   Lab Results   Component Value Date    APH 7.35 12/24/2023    APCO2 40 12/24/2023    APO2 77 (L) 12/24/2023    AHCO3 22 12/24/2023    ASAT 95 12/24/2023     O2 Vent Settings Last Value   Mode Volume A/C   Rate 15   Tidal Volume 500 mL   Peak Inspiratory Pressure     Plateau Pressure     Pressure Support     PEEP/CPAC/EPAP 6 cm H20   FiO2 50 %     FEN/GI:  # GERD    # Best practices  - Stress ulcer prophylaxis: PPI  - Replete lytes prn  - Bowel regimen prn    # Nutrition  - Diet: NPO    :  # No active issues    Weight: 97.1 kg (214 lb 1.6 oz), Admit: Weight: 103.5 kg (228 lb 2.8 oz)  I/O last 3 completed shifts:  In: 2304.8 [P.O.:240; I.V.:2064.8]  Out: 200 [Urine:200]    Intake/Output Summary (Last 24 hours) at 12/24/2023 1114  Last data filed at 12/24/2023 1020  Gross per 24 hour   Intake 2652.13 ml   Output 5150 ml   Net -2497.87 ml       Recent Labs   Lab 12/24/23  0309 12/23/23  0319 12/22/23  1909 12/22/23  0314   SODIUM 135 135  --  134*   POTASSIUM 3.2* 4.1 3.2* 3.9   CHLORIDE 105 104  --  102   CO2 24 21  --  21   BUN 17 21*  --  23*   CREATININE 0.71 0.64*  --  0.69   GLUCOSE 174* 279*  --  357*   ANIONGAP 9 14  --  15   MG  --  1.7 1.6* 1.6*        HEME:  # Acute blood loss anemia  - Monitor daily CBC  - Transfuse for Hgb < 7.0 in the absence of active bleeding    # DVT Prophylaxis  - SCDs  Lab Results   Component Value Date    HGB 9.7 (L) 12/24/2023    HGB 12.4 (L) 12/23/2023    HCT 28.8 (L) 12/24/2023     12/24/2023     12/23/2023    PTT 38 (H) 12/23/2023    INR 1.2 12/23/2023    INR 1.2 12/20/2023       ID:  Temperature: 98.4 °F (36.9 °C), Temp  Min: 98 °F (36.7 °C)  Max: 98.4 °F (36.9 °C)  Lab Results   Component Value Date    WBC 13.1 (H) 12/24/2023    WBC  13.0 (H) 12/23/2023    WBC 8.3 12/22/2023    BAND 1 09/20/2011    BAND 4 09/18/2011    CRP 0.7 11/05/2022     Cultures: Not Applicable    ENDO:  # Glycemic control/DM  - Insulin infusion    BMI 28.25     Recent Labs   Lab 12/23/23  1349 12/23/23  1903 12/23/23  2135 12/24/23  0418   GLUCOSE BEDSIDE 254* 102* 132* 165*     Lab Results   Component Value Date    GLUCOSE 174 (H) 12/24/2023    TSH 3.112 12/23/2023       Reviewed Data Points:  Allergies, Medications, Labs, Imaging, and Physician and Nursing Notes    ACCS Attestation       This patient is critically ill with acute postoperative multifactorial shock and a need for mechanical ventilation as documented above. I evaluated the patient and reviewed imaging and laboratory data. I provided 38 minutes critical care services not including time allocated for procedures.    Dipak Galeas MD  [facility]  Pager [phone]     no...

## 2023-12-27 ENCOUNTER — INPATIENT (INPATIENT)
Facility: HOSPITAL | Age: 68
LOS: 5 days | Discharge: HOME | DRG: 871 | End: 2024-01-02
Attending: STUDENT IN AN ORGANIZED HEALTH CARE EDUCATION/TRAINING PROGRAM | Admitting: HOSPITALIST
Payer: MEDICARE

## 2023-12-27 ENCOUNTER — NON-APPOINTMENT (OUTPATIENT)
Age: 68
End: 2023-12-27

## 2023-12-27 VITALS
HEIGHT: 70 IN | OXYGEN SATURATION: 98 % | WEIGHT: 139.99 LBS | RESPIRATION RATE: 22 BRPM | TEMPERATURE: 98 F | SYSTOLIC BLOOD PRESSURE: 123 MMHG | HEART RATE: 136 BPM | DIASTOLIC BLOOD PRESSURE: 80 MMHG

## 2023-12-27 DIAGNOSIS — Z98.890 OTHER SPECIFIED POSTPROCEDURAL STATES: Chronic | ICD-10-CM

## 2023-12-27 DIAGNOSIS — Z95.0 PRESENCE OF CARDIAC PACEMAKER: Chronic | ICD-10-CM

## 2023-12-27 LAB
ALBUMIN SERPL ELPH-MCNC: 4.5 G/DL — SIGNIFICANT CHANGE UP (ref 3.3–5)
ALBUMIN SERPL ELPH-MCNC: 4.5 G/DL — SIGNIFICANT CHANGE UP (ref 3.3–5)
ALP SERPL-CCNC: 123 U/L — HIGH (ref 40–120)
ALP SERPL-CCNC: 123 U/L — HIGH (ref 40–120)
ALT FLD-CCNC: 12 U/L — SIGNIFICANT CHANGE UP (ref 10–45)
ALT FLD-CCNC: 12 U/L — SIGNIFICANT CHANGE UP (ref 10–45)
ANION GAP SERPL CALC-SCNC: 13 MMOL/L — SIGNIFICANT CHANGE UP (ref 5–17)
ANION GAP SERPL CALC-SCNC: 13 MMOL/L — SIGNIFICANT CHANGE UP (ref 5–17)
APTT BLD: 30.2 SEC — SIGNIFICANT CHANGE UP (ref 24.5–35.6)
APTT BLD: 30.2 SEC — SIGNIFICANT CHANGE UP (ref 24.5–35.6)
AST SERPL-CCNC: 15 U/L — SIGNIFICANT CHANGE UP (ref 10–40)
AST SERPL-CCNC: 15 U/L — SIGNIFICANT CHANGE UP (ref 10–40)
BASE EXCESS BLDV CALC-SCNC: 1.3 MMOL/L — SIGNIFICANT CHANGE UP (ref -2–3)
BASE EXCESS BLDV CALC-SCNC: 1.3 MMOL/L — SIGNIFICANT CHANGE UP (ref -2–3)
BASOPHILS # BLD AUTO: 0.05 K/UL — SIGNIFICANT CHANGE UP (ref 0–0.2)
BASOPHILS # BLD AUTO: 0.05 K/UL — SIGNIFICANT CHANGE UP (ref 0–0.2)
BASOPHILS NFR BLD AUTO: 0.4 % — SIGNIFICANT CHANGE UP (ref 0–2)
BASOPHILS NFR BLD AUTO: 0.4 % — SIGNIFICANT CHANGE UP (ref 0–2)
BILIRUB SERPL-MCNC: 0.8 MG/DL — SIGNIFICANT CHANGE UP (ref 0.2–1.2)
BILIRUB SERPL-MCNC: 0.8 MG/DL — SIGNIFICANT CHANGE UP (ref 0.2–1.2)
BUN SERPL-MCNC: 18 MG/DL — SIGNIFICANT CHANGE UP (ref 7–23)
BUN SERPL-MCNC: 18 MG/DL — SIGNIFICANT CHANGE UP (ref 7–23)
CA-I SERPL-SCNC: 1.24 MMOL/L — SIGNIFICANT CHANGE UP (ref 1.15–1.33)
CA-I SERPL-SCNC: 1.24 MMOL/L — SIGNIFICANT CHANGE UP (ref 1.15–1.33)
CALCIUM SERPL-MCNC: 10.1 MG/DL — SIGNIFICANT CHANGE UP (ref 8.4–10.5)
CALCIUM SERPL-MCNC: 10.1 MG/DL — SIGNIFICANT CHANGE UP (ref 8.4–10.5)
CHLORIDE BLDV-SCNC: 97 MMOL/L — SIGNIFICANT CHANGE UP (ref 96–108)
CHLORIDE BLDV-SCNC: 97 MMOL/L — SIGNIFICANT CHANGE UP (ref 96–108)
CHLORIDE SERPL-SCNC: 97 MMOL/L — SIGNIFICANT CHANGE UP (ref 96–108)
CHLORIDE SERPL-SCNC: 97 MMOL/L — SIGNIFICANT CHANGE UP (ref 96–108)
CO2 BLDV-SCNC: 29 MMOL/L — HIGH (ref 22–26)
CO2 BLDV-SCNC: 29 MMOL/L — HIGH (ref 22–26)
CO2 SERPL-SCNC: 26 MMOL/L — SIGNIFICANT CHANGE UP (ref 22–31)
CO2 SERPL-SCNC: 26 MMOL/L — SIGNIFICANT CHANGE UP (ref 22–31)
CREAT SERPL-MCNC: 1.3 MG/DL — SIGNIFICANT CHANGE UP (ref 0.5–1.3)
CREAT SERPL-MCNC: 1.3 MG/DL — SIGNIFICANT CHANGE UP (ref 0.5–1.3)
EGFR: 61 ML/MIN/1.73M2 — SIGNIFICANT CHANGE UP
EGFR: 61 ML/MIN/1.73M2 — SIGNIFICANT CHANGE UP
EOSINOPHIL # BLD AUTO: 0.04 K/UL — SIGNIFICANT CHANGE UP (ref 0–0.5)
EOSINOPHIL # BLD AUTO: 0.04 K/UL — SIGNIFICANT CHANGE UP (ref 0–0.5)
EOSINOPHIL NFR BLD AUTO: 0.3 % — SIGNIFICANT CHANGE UP (ref 0–6)
EOSINOPHIL NFR BLD AUTO: 0.3 % — SIGNIFICANT CHANGE UP (ref 0–6)
GAS PNL BLDV: 132 MMOL/L — LOW (ref 136–145)
GAS PNL BLDV: 132 MMOL/L — LOW (ref 136–145)
GAS PNL BLDV: SIGNIFICANT CHANGE UP
GAS PNL BLDV: SIGNIFICANT CHANGE UP
GLUCOSE BLDV-MCNC: 144 MG/DL — HIGH (ref 70–99)
GLUCOSE BLDV-MCNC: 144 MG/DL — HIGH (ref 70–99)
GLUCOSE SERPL-MCNC: 142 MG/DL — HIGH (ref 70–99)
GLUCOSE SERPL-MCNC: 142 MG/DL — HIGH (ref 70–99)
HCO3 BLDV-SCNC: 28 MMOL/L — SIGNIFICANT CHANGE UP (ref 22–29)
HCO3 BLDV-SCNC: 28 MMOL/L — SIGNIFICANT CHANGE UP (ref 22–29)
HCT VFR BLD CALC: 46.5 % — SIGNIFICANT CHANGE UP (ref 39–50)
HCT VFR BLD CALC: 46.5 % — SIGNIFICANT CHANGE UP (ref 39–50)
HCT VFR BLDA CALC: 47 % — SIGNIFICANT CHANGE UP (ref 39–51)
HCT VFR BLDA CALC: 47 % — SIGNIFICANT CHANGE UP (ref 39–51)
HGB BLD CALC-MCNC: 15.6 G/DL — SIGNIFICANT CHANGE UP (ref 12.6–17.4)
HGB BLD CALC-MCNC: 15.6 G/DL — SIGNIFICANT CHANGE UP (ref 12.6–17.4)
HGB BLD-MCNC: 15.2 G/DL — SIGNIFICANT CHANGE UP (ref 13–17)
HGB BLD-MCNC: 15.2 G/DL — SIGNIFICANT CHANGE UP (ref 13–17)
IMM GRANULOCYTES NFR BLD AUTO: 0.5 % — SIGNIFICANT CHANGE UP (ref 0–0.9)
IMM GRANULOCYTES NFR BLD AUTO: 0.5 % — SIGNIFICANT CHANGE UP (ref 0–0.9)
INR BLD: 1.08 RATIO — SIGNIFICANT CHANGE UP (ref 0.85–1.18)
INR BLD: 1.08 RATIO — SIGNIFICANT CHANGE UP (ref 0.85–1.18)
LACTATE BLDV-MCNC: 2.2 MMOL/L — HIGH (ref 0.5–2)
LACTATE BLDV-MCNC: 2.2 MMOL/L — HIGH (ref 0.5–2)
LYMPHOCYTES # BLD AUTO: 1.24 K/UL — SIGNIFICANT CHANGE UP (ref 1–3.3)
LYMPHOCYTES # BLD AUTO: 1.24 K/UL — SIGNIFICANT CHANGE UP (ref 1–3.3)
LYMPHOCYTES # BLD AUTO: 9.9 % — LOW (ref 13–44)
LYMPHOCYTES # BLD AUTO: 9.9 % — LOW (ref 13–44)
MCHC RBC-ENTMCNC: 28.6 PG — SIGNIFICANT CHANGE UP (ref 27–34)
MCHC RBC-ENTMCNC: 28.6 PG — SIGNIFICANT CHANGE UP (ref 27–34)
MCHC RBC-ENTMCNC: 32.7 GM/DL — SIGNIFICANT CHANGE UP (ref 32–36)
MCHC RBC-ENTMCNC: 32.7 GM/DL — SIGNIFICANT CHANGE UP (ref 32–36)
MCV RBC AUTO: 87.4 FL — SIGNIFICANT CHANGE UP (ref 80–100)
MCV RBC AUTO: 87.4 FL — SIGNIFICANT CHANGE UP (ref 80–100)
MONOCYTES # BLD AUTO: 1.01 K/UL — HIGH (ref 0–0.9)
MONOCYTES # BLD AUTO: 1.01 K/UL — HIGH (ref 0–0.9)
MONOCYTES NFR BLD AUTO: 8.1 % — SIGNIFICANT CHANGE UP (ref 2–14)
MONOCYTES NFR BLD AUTO: 8.1 % — SIGNIFICANT CHANGE UP (ref 2–14)
NEUTROPHILS # BLD AUTO: 10.1 K/UL — HIGH (ref 1.8–7.4)
NEUTROPHILS # BLD AUTO: 10.1 K/UL — HIGH (ref 1.8–7.4)
NEUTROPHILS NFR BLD AUTO: 80.8 % — HIGH (ref 43–77)
NEUTROPHILS NFR BLD AUTO: 80.8 % — HIGH (ref 43–77)
NRBC # BLD: 0 /100 WBCS — SIGNIFICANT CHANGE UP (ref 0–0)
NRBC # BLD: 0 /100 WBCS — SIGNIFICANT CHANGE UP (ref 0–0)
PCO2 BLDV: 49 MMHG — SIGNIFICANT CHANGE UP (ref 42–55)
PCO2 BLDV: 49 MMHG — SIGNIFICANT CHANGE UP (ref 42–55)
PH BLDV: 7.36 — SIGNIFICANT CHANGE UP (ref 7.32–7.43)
PH BLDV: 7.36 — SIGNIFICANT CHANGE UP (ref 7.32–7.43)
PLATELET # BLD AUTO: 237 K/UL — SIGNIFICANT CHANGE UP (ref 150–400)
PLATELET # BLD AUTO: 237 K/UL — SIGNIFICANT CHANGE UP (ref 150–400)
PO2 BLDV: 31 MMHG — SIGNIFICANT CHANGE UP (ref 25–45)
PO2 BLDV: 31 MMHG — SIGNIFICANT CHANGE UP (ref 25–45)
POTASSIUM BLDV-SCNC: 4.2 MMOL/L — SIGNIFICANT CHANGE UP (ref 3.5–5.1)
POTASSIUM BLDV-SCNC: 4.2 MMOL/L — SIGNIFICANT CHANGE UP (ref 3.5–5.1)
POTASSIUM SERPL-MCNC: 4.1 MMOL/L — SIGNIFICANT CHANGE UP (ref 3.5–5.3)
POTASSIUM SERPL-MCNC: 4.1 MMOL/L — SIGNIFICANT CHANGE UP (ref 3.5–5.3)
POTASSIUM SERPL-SCNC: 4.1 MMOL/L — SIGNIFICANT CHANGE UP (ref 3.5–5.3)
POTASSIUM SERPL-SCNC: 4.1 MMOL/L — SIGNIFICANT CHANGE UP (ref 3.5–5.3)
PROCALCITONIN SERPL-MCNC: 0.12 NG/ML — HIGH (ref 0.02–0.1)
PROCALCITONIN SERPL-MCNC: 0.12 NG/ML — HIGH (ref 0.02–0.1)
PROT SERPL-MCNC: 7.7 G/DL — SIGNIFICANT CHANGE UP (ref 6–8.3)
PROT SERPL-MCNC: 7.7 G/DL — SIGNIFICANT CHANGE UP (ref 6–8.3)
PROTHROM AB SERPL-ACNC: 11.9 SEC — SIGNIFICANT CHANGE UP (ref 9.5–13)
PROTHROM AB SERPL-ACNC: 11.9 SEC — SIGNIFICANT CHANGE UP (ref 9.5–13)
RAPID RVP RESULT: DETECTED
RAPID RVP RESULT: DETECTED
RBC # BLD: 5.32 M/UL — SIGNIFICANT CHANGE UP (ref 4.2–5.8)
RBC # BLD: 5.32 M/UL — SIGNIFICANT CHANGE UP (ref 4.2–5.8)
RBC # FLD: 13.2 % — SIGNIFICANT CHANGE UP (ref 10.3–14.5)
RBC # FLD: 13.2 % — SIGNIFICANT CHANGE UP (ref 10.3–14.5)
SAO2 % BLDV: 51.4 % — LOW (ref 67–88)
SAO2 % BLDV: 51.4 % — LOW (ref 67–88)
SARS-COV-2 RNA SPEC QL NAA+PROBE: DETECTED
SARS-COV-2 RNA SPEC QL NAA+PROBE: DETECTED
SODIUM SERPL-SCNC: 136 MMOL/L — SIGNIFICANT CHANGE UP (ref 135–145)
SODIUM SERPL-SCNC: 136 MMOL/L — SIGNIFICANT CHANGE UP (ref 135–145)
TROPONIN T, HIGH SENSITIVITY RESULT: 19 NG/L — SIGNIFICANT CHANGE UP (ref 0–51)
TROPONIN T, HIGH SENSITIVITY RESULT: 19 NG/L — SIGNIFICANT CHANGE UP (ref 0–51)
WBC # BLD: 12.5 K/UL — HIGH (ref 3.8–10.5)
WBC # BLD: 12.5 K/UL — HIGH (ref 3.8–10.5)
WBC # FLD AUTO: 12.5 K/UL — HIGH (ref 3.8–10.5)
WBC # FLD AUTO: 12.5 K/UL — HIGH (ref 3.8–10.5)

## 2023-12-27 PROCEDURE — 71045 X-RAY EXAM CHEST 1 VIEW: CPT | Mod: 26

## 2023-12-27 PROCEDURE — 99285 EMERGENCY DEPT VISIT HI MDM: CPT

## 2023-12-27 RX ORDER — SODIUM CHLORIDE 9 MG/ML
250 INJECTION INTRAMUSCULAR; INTRAVENOUS; SUBCUTANEOUS ONCE
Refills: 0 | Status: COMPLETED | OUTPATIENT
Start: 2023-12-27 | End: 2023-12-27

## 2023-12-27 RX ORDER — MYCOPHENOLATE MOFETIL 250 MG/1
500 CAPSULE ORAL ONCE
Refills: 0 | Status: COMPLETED | OUTPATIENT
Start: 2023-12-27 | End: 2023-12-27

## 2023-12-27 RX ORDER — ACETAMINOPHEN 500 MG
975 TABLET ORAL ONCE
Refills: 0 | Status: COMPLETED | OUTPATIENT
Start: 2023-12-27 | End: 2023-12-27

## 2023-12-27 RX ADMIN — SODIUM CHLORIDE 250 MILLILITER(S): 9 INJECTION INTRAMUSCULAR; INTRAVENOUS; SUBCUTANEOUS at 20:30

## 2023-12-27 RX ADMIN — Medication 975 MILLIGRAM(S): at 20:30

## 2023-12-27 NOTE — ED PROVIDER NOTE - CLINICAL SUMMARY MEDICAL DECISION MAKING FREE TEXT BOX
Pb Vance, DO: 69 yo M PMHx of NICM/ICM, LVEF 10-15%, s/p CRT-D, CAD s/p PCI, DM2, CKD III, VT, heart transplant, on tacro/mycofenolate, no abx, presenting to ED for c/o fevers, cough, congestion, and sore throat x 1 day. Denies CP or SOB. Denies abd pain. No sig changes in urination. No N/V/D. No lightheadedness or palpitations. Daughter +sick contact, COVID+. On exam it tachy, no oral fever. No Tylenol today. Took Sudafed x2 at 11 AM. CTAB. Speaking complete sentences. Heart tachy, regular rhythm. EKG rate 121, right bundle branch block, , , QTc 451, T wave inversions in leads V1 through V4.  Largely unchanged from July 2023.  No STEMI. Abdomen soft nontender to palpation.  No significant lower extremity edema appreciated. Patient alert and oriented x 3.  No acute distress.  Resting comfortably in bed.  Transplant ID at bedside in ER and recommendations provided.  Will obtain sepsis screening lab work, and lab work recommended by ID.  Will obtain chest x-ray, blood cultures.  Will obtain repeat temperature to assess for fever.  Patient generally well-appearing.  Will reassess following initial ED workup for further interventions and diagnostics, and final disposition.  Patient agreeable to plan.

## 2023-12-27 NOTE — ED PROVIDER NOTE - PHYSICAL EXAMINATION
Gen: Alert Ox3. NAD. Ambulatory.   HEENT: Atraumatic. Mucous membranes moist.  CV: Tachy. No significant LE edema.   Resp: Unlabored-respirations. CTAB.  GI: Abdomen non tender to palpation, soft.  Skin/MSK: No open wounds.   Neuro: EOMI. Pupils ERRL. Following commands.   Psych: Appropriate mood, cooperative

## 2023-12-27 NOTE — ED PROVIDER NOTE - WR ORDER DATE AND TIME 1
After Visit Summary   7/12/2017    Duane V Mell    MRN: [id number]           Patient Information     Date Of Birth          1939        Visit Information        Provider Department      7/12/2017 1:00 PM Shawna Mena APRN CNP [facility]        Today's Diagnoses     Hip pain, right    -  1      Care Instructions    We will call you with the results of your X rays    Take the Tylenol #3 one or two tablets every 6 hours as needed for your pain. I recommend that you take it tonight before bed.    Take Ibuprofen 600 mg every 6 hours as needed. You can alternate with Tylenol 650 mg every 6 hours as needed.    If you take the Tylenol #3, then you can't take the plain   Tylenol.    Apply ice for 20 minutes several times a day.        Your blood pressure was elevated today.  Please schedule a nurse only appointment at your nearest [facility] to have your blood pressure checked. You may also have your Blood Pressure checked at a [facility] pharmacy.          Follow-ups after your visit        Who to contact     If you have questions or need follow up information about today's clinic visit or your schedule please contact [facility] directly at [phone].  Normal or non-critical lab and imaging results will be communicated to you by MyChart, letter or phone within 4 business days after the clinic has received the results. If you do not hear from us within 7 days, please contact the clinic through [employer] or phone. If you have a critical or abnormal lab result, we will notify you by phone as soon as possible.  Submit refill requests through [employer] or call your pharmacy and they will forward the refill request to us. Please allow 3 business days for your refill to be completed.          Additional Information About Your Visit        MyChart Information     [employer] lets you send messages to your doctor, view your test results, renew your prescriptions, schedule appointments  "and more. To sign up, go to [email or web address] . Click on \"Log in\" on the left side of the screen, which will take you to the Welcome page. Then click on \"Sign up Now\" on the right side of the page.     You will be asked to enter the access code listed below, as well as some personal information. Please follow the directions to create your username and password.     Your access code is: [email or web address]  Expires: 2017 12:14 PM     Your access code will  in 90 days. If you need help or a new code, please call your [facility] clinic or [phone].        Care EveryWhere ID     This is your Care EveryWhere ID. This could be used by other organizations to access your [facility] medical records  [id number]        Your Vitals Were     Pulse Temperature Respirations Pulse Oximetry BMI (Body Mass Index)       79 97  F (36.1  C) (Tympanic) 18 96% 31.33 kg/m2        Blood Pressure from Last 3 Encounters:   17 150/72   17 122/76   17 130/77    Weight from Last 3 Encounters:   17 203 lb (92.1 kg)   17 208 lb (94.3 kg)   16 205 lb (93 kg)                 Today's Medication Changes          These changes are accurate as of: 17  2:08 PM.  If you have any questions, ask your nurse or doctor.               Start taking these medicines.        Dose/Directions    acetaminophen-codeine 300-30 MG per tablet   Commonly known as:  TYLENOL #3   Used for:  Hip pain, right   Started by:  Shawna Mena APRN CNP        Dose:  1-2 tablet   Take 1-2 tablets by mouth every 6 hours as needed for pain maximum 6 tablet(s) per day   Quantity:  18 tablet   Refills:  0            Where to get your medicines      Some of these will need a paper prescription and others can be bought over the counter.  Ask your nurse if you have questions.     Bring a paper prescription for each of these medications     acetaminophen-codeine 300-30 MG per tablet                Primary Care Provider Office Phone # " Fax #    Feliz Best [phone]       [facility] [address] [facility] [address]        Equal Access to Services     JEANA LOPEZ : Hadileana kristopher adair ejd Lewis, waaxda luqadaha, qaybta kaalmada adebrianda, miguelina gimenez laRylanjo pino. So [facility] [phone].    ATENCIÓN: Si habla español, tiene a armendariz disposición servicios gratuitos de asistencia lingüística. Llame al [phone].    We comply with applicable federal civil rights laws and [place] laws. We do not discriminate on the basis of race, color, national origin, age, disability sex, sexual orientation or gender identity.            Thank you!     Thank you for choosing [facility]  for your care. Our goal is always to provide you with excellent care. Hearing back from our patients is one way we can continue to improve our services. Please take a few minutes to complete the written survey that you may receive in the mail after your visit with us. Thank you!             Your Updated Medication List - Protect others around you: Learn how to safely use, store and throw away your medicines at www.disposemymeds.org.          This list is accurate as of: 7/12/17  2:08 PM.  Always use your most recent med list.                   Brand Name Dispense Instructions for use Diagnosis    acetaminophen-codeine 300-30 MG per tablet    TYLENOL #3    18 tablet    Take 1-2 tablets by mouth every 6 hours as needed for pain maximum 6 tablet(s) per day    Hip pain, right       aspirin 81 MG tablet      Take 1 tablet by mouth every evening        chlorthalidone 25 MG tablet    HYGROTON    90 tablet    Take 1 tablet (25 mg) by mouth daily    Essential hypertension, hypertension with unspecified goal       IBUPROFEN PO      Take 600 mg by mouth once as needed for moderate pain        latanoprost 0.005 % ophthalmic solution    XALATAN     Place 1 drop into both eyes every evening        losartan 50 MG tablet     COZAAR    90 tablet    Take 1 tablet (50 mg) by mouth daily    Essential hypertension, hypertension with unspecified goal       simvastatin 20 MG tablet    ZOCOR    90 tablet    Take 1 tablet (20 mg) by mouth At Bedtime    Hyperlipidemia LDL goal <130          27-Dec-2023 17:02

## 2023-12-27 NOTE — CONSULT NOTE ADULT - SUBJECTIVE AND OBJECTIVE BOX
66M with ACC/AHA Stage D mixed NICM/ICM s/p PPM upgraded to CRT-D, CAD s/p PCI to mLAD 4/2022, eventually s/p OHT on 6/21/22 (CMV +/+, Toxo -/-), course most recently complicated with rejection treated with pulse dose steroids 6.2023, then on higher cellcept ./tacr,  norovirus in July with now resolved diarrhea , admitted in september with non COVID coronavirus, now sent to ER with sympotms of cough malaise, muscle aches and fever to 101.3. Also reporting HA nut no diarrhea. his daughter tested positive for COVID 19 today after she became symptomatic yesterday.  Mr Du denies SOB or HSIEH. He also endorses occasional production of green sputum and sore throate.  66M with ACC/AHA Stage D mixed NICM/ICM s/p PPM upgraded to CRT-D, CAD s/p PCI to mLAD 4/2022, eventually s/p OHT on 6/21/22 (CMV +/+, Toxo -/-), course most recently complicated with rejection treated with pulse dose steroids 6.2023, then on higher cellcept ./tacr,  norovirus in July with now resolved diarrhea , admitted in september with non COVID coronavirus, now sent to ER with symptoms of cough malaise, muscle aches and fever to 101.3. Also reporting HA nut no diarrhea. his daughter tested positive for COVID 19 today after she became symptomatic yesterday.  Mr Du denies SOB or HSIEH. He also endorses occasional production of green sputum and sore throat    Tachycardic, CBC noting WBC 12, procal 0.12      Vital Signs Last 24 Hrs  T(C): 38.5 (27 Dec 2023 18:57), Max: 38.5 (27 Dec 2023 18:57)  T(F): 101.3 (27 Dec 2023 18:57), Max: 101.3 (27 Dec 2023 18:57)  HR: 136 (27 Dec 2023 16:38) (136 - 136)  BP: 123/80 (27 Dec 2023 16:38) (123/80 - 123/80)  BP(mean): --  RR: 22 (27 Dec 2023 16:38) (22 - 22)  SpO2: 98% (27 Dec 2023 16:38) (98% - 98%)    Parameters below as of 27 Dec 2023 16:38  Patient On (Oxygen Delivery Method): room air    T(C): 38.5 (12-27-23 @ 18:57), Max: 38.5 (12-27-23 @ 18:57)  HR: 136 (12-27-23 @ 16:38) (136 - 136)  BP: 123/80 (12-27-23 @ 16:38) (123/80 - 123/80)  RR: 22 (12-27-23 @ 16:38) (22 - 22)  SpO2: 98% (12-27-23 @ 16:38) (98% - 98%)    CONSTITUTIONAL: Well groomed, no apparent distress  EYES: PERRLA and symmetric, EOMI, No conjunctival or scleral injection, non-icteric  ENMT: Oral mucosa with moist membranes. Normal dentition; no pharyngeal injection or exudates             NECK: Supple, symmetric and without tracheal deviation   RESP: No respiratory distress, no use of accessory muscles; CTA b/l, no WRR  CV: RRR, +S1S2, no MRG; no JVD; no peripheral edema  GI: Soft, NT, ND, no rebound, no guarding; no palpable masses; no hepatosplenomegaly; no hernia palpated  LYMPH: No cervical LAD or tenderness; no axillary LAD or tenderness; no inguinal LAD or tenderness  MSK: Normal gait; No digital clubbing or cyanosis; examination of the (head/neck/spine/ribs/pelvis, RUE, LUE, RLE, LLE) without misalignment,            Normal ROM without pain, no spinal tenderness, normal muscle strength/tone  SKIN: No rashes or ulcers noted; no subcutaneous nodules or induration palpable      ____________________________________________________  ROS  GENERAL: denies chills, , night sweats, weight loss.   PSYCH: denies depression, anxiety, suicidal ideation, hallucination, and delusions  SKIN: no rash or lesions; no color changes, no abnormal nevi,no  dryness, and nojaundice    EYES: denies visual changes, floaters, pain, inflammation, blurred vision, and discharge  ENT: denies tinnitus, vertigo, epistaxis, oral lesion, and decreased acuity  PULM: denies, hemoptysis, pleurisy  CVS: denies angina, palpitations,+ orthopnea, no syncope, or heart murmur  GI: denies constipation, diarrhea, melena, abdominal pain, nausea.   : denies dysuria, frequency, discharge, incontinence, stones or macroscopic hematuria  MS: no arthralgias, no erythema or swelling, no myalgias, noedema, or lower back pain.   CNS: denies numbness, dizziness, seizure, or tremor  ENDO: denies heat/cold intolerance, polyuria, polydipsia, malaise.    HEME: denies bruising, bleeding, lymphadenopathy, anemia, and calf pain    Allergies  No Known Allergies    __________________________________________________  MEDS:  MEDICATIONS  (STANDING):    _________________________________________________  ANTIMICOBIALS      GENERAL LABS              15.2                 136  | 26   | 18           12.50 >-----------< 237     ------------------------< 142                   46.5                 4.1  | 97   | 1.30                                         Ca 10.1  Mg x     Ph x          Urinalysis Basic - ( 27 Dec 2023 17:42 )    Color: x / Appearance: x / SG: x / pH: x  Gluc: 142 mg/dL / Ketone: x  / Bili: x / Urobili: x   Blood: x / Protein: x / Nitrite: x   Leuk Esterase: x / RBC: x / WBC x   Sq Epi: x / Non Sq Epi: x / Bacteria: x        _________________________________________________  MICROBIOLOGY  -----------          Rapid RVP Result: Detected (12-27 @ 17:56)          Fungitell:   _______________________________________________  PERTINENT IMAGING

## 2023-12-27 NOTE — ED ADULT NURSE NOTE - OBJECTIVE STATEMENT
Patient BIBEMS c/o flu-like symptoms since Sunday. Patient had heart transplant June 2022. Patient reports fever, max 102 at home and cough and told to come in by transplant doctor. Patient had some complications with transplant rejection in June 2023 that was resolved in October. Patient A&Ox4, ambulatory. Patient tachycardic and febrile on arrival. Placed on cardiac monitor. Plan of care in progress.

## 2023-12-27 NOTE — CONSULT NOTE ADULT - ASSESSMENT
66M with ACC/AHA Stage D mixed NICM/ICM s/p PPM upgraded to CRT-D, CAD s/p PCI to mLAD 4/2022, eventually s/p OHT on 6/21/22 (CMV +/+, Toxo -/-), course most recently complicated with rejection treated with pulse dose steroids 6.2023, then on higher cellcept ./tacr,  norovirus in July with now resolved diarrhea , admitted in september with non COVID coronavirus, now sent to ER with symptoms of cough malaise, muscle aches and fever to 101.3.       1. s/p OHT on 6/21/22 (CMV +/+, Toxo -/-), for NICM/ICM  simuleect induction, tacro, MMF  Complications ACR 6/2023 treated with pulse dose steroidis      2. Fever, Cough  positive COVID 19 contact, most likely viral URI/bronchitis +/- pneumonia    Recommendations  BC, UA, UC  FULL RVP  If COVID 19 positive, begin remdesivir  CMV PCr, EBV PCR in c/o fever  MRSA nares  CXR with low threshold for CTchest  Sputum cultures if feasible as patient occasionally has sputum   low grade leucocytosis and low pos procal suspect still viral  If imaging c/f superimposed pneumonia (and/or no clear viral culprit) would then give cefepime /azithromycin and add pneumococcal antigen in urine. legionella Ag  Will follow preliminary workup in am    Thank you for involving us in the care of this patient  Transplant ID will continue to follow  Please call or page with additional questions  Pager; #[phone]  Teams: from 8 am to 5 pm  Keke Escamilla MD         66M with ACC/AHA Stage D mixed NICM/ICM s/p PPM upgraded to CRT-D, CAD s/p PCI to mLAD 4/2022, eventually s/p OHT on 6/21/22 (CMV +/+, Toxo -/-), course most recently complicated with rejection treated with pulse dose steroids 6.2023, then on higher cellcept ./tacr,  norovirus in July with now resolved diarrhea , admitted in september with non COVID coronavirus, now sent to ER with symptoms of cough malaise, muscle aches and fever to 101.3.       1. s/p OHT on 6/21/22 (CMV +/+, Toxo -/-), for NICM/ICM  simuleect induction, tacro, MMF  Complications ACR 6/2023 treated with pulse dose steroidis      2. Fever, Cough  positive COVID 19 contact, most likely viral URI/bronchitis +/- pneumonia    Recommendations  BC, UA, UC  FULL RVP  If COVID 19 positive, begin remdesivir  CMV PCr, EBV PCR in c/o fever  MRSA nares  CXR with low threshold for CTchest  Sputum cultures if feasible as patient occasionally has sputum   low grade leucocytosis and low pos procal suspect still viral  If imaging c/f superimposed pneumonia (and/or no clear viral culprit) would then give cefepime /azithromycin and add pneumococcal antigen in urine. legionella Ag  Will follow preliminary workup in am    Thank you for involving us in the care of this patient  Transplant ID will continue to follow  Please call or page with additional questions  Pager; #[phone]  Teams: from 8 am to 5 pm  Keke Escamilla MD         66M with ACC/AHA Stage D mixed NICM/ICM s/p PPM upgraded to CRT-D, CAD s/p PCI to mLAD 4/2022, eventually s/p OHT on 6/21/22 (CMV +/+, Toxo -/-), course most recently complicated with rejection treated with pulse dose steroids 6.2023, then on higher cellcept ./tacr,  norovirus in July with now resolved diarrhea , admitted in september with non COVID coronavirus, now sent to ER with symptoms of cough malaise, muscle aches and fever to 101.3.       1. s/p OHT on 6/21/22 (CMV +/+, Toxo -/-), for NICM/ICM  simuleect induction, tacro, MMF  Complications ACR 6/2023 treated with pulse dose steroidis      2. Fever, Cough  positive COVID 19 contact, most likely viral URI/bronchitis +/- pneumonia from COVID 19    Recommendations  BC, UA, UC  FULL RVP  If COVID 19 positive, begin remdesivir 200mg x1 then daily for 3-5 days based on imaging andO2 requirments.   CMV PCr, EBV PCR in c/o fever  MRSA nares  CXR with low threshold for CT chest if COVID 19 pcr pos  Sputum cultures if feasible as patient occasionally has sputum   low grade leucocytosis and low pos procal suspect still viral  If imaging c/f superimposed pneumonia (and/or no clear viral culprit)  may consider cefepime /azithromycin and add pneumococcal antigen in urine. legionella Ag  Will follow preliminary workup in am    Thank you for involving us in the care of this patient  Transplant ID will continue to follow  Please call or page with additional questions  Pager; #[phone]  Teams: from 8 am to 5 pm  Keke Escamilla MD         66M with ACC/AHA Stage D mixed NICM/ICM s/p PPM upgraded to CRT-D, CAD s/p PCI to mLAD 4/2022, eventually s/p OHT on 6/21/22 (CMV +/+, Toxo -/-), course most recently complicated with rejection treated with pulse dose steroids 6.2023, then on higher cellcept ./tacr,  norovirus in July with now resolved diarrhea , admitted in september with non COVID coronavirus, now sent to ER with symptoms of cough malaise, muscle aches and fever to 101.3.       1. s/p OHT on 6/21/22 (CMV +/+, Toxo -/-), for NICM/ICM  simuleect induction, tacro, MMF  Complications ACR 6/2023 treated with pulse dose steroidis      2. Fever, Cough  positive COVID 19 contact, most likely viral URI/bronchitis +/- pneumonia from COVID 19    Recommendations  BC, UA, UC  FULL RVP  If COVID 19 positive, begin remdesivir 200mg x1 then daily for 3-5 days based on imaging andO2 requirments.   CMV PCr, EBV PCR in c/o fever  MRSA nares  CXR with low threshold for CT chest if COVID 19 pcr pos  Sputum cultures if feasible as patient occasionally has sputum   low grade leucocytosis and low pos procal suspect still viral  If imaging c/f superimposed pneumonia (and/or no clear viral culprit)  may consider cefepime /azithromycin and add pneumococcal antigen in urine. legionella Ag  Will follow preliminary workup in am    Thank you for involving us in the care of this patient  Transplant ID will continue to follow  Please call or page with additional questions  Pager; #[phone]  Teams: from 8 am to 5 pm  Keke Escamilla MD         66M with ACC/AHA Stage D mixed NICM/ICM s/p PPM upgraded to CRT-D, CAD s/p PCI to mLAD 4/2022, eventually s/p OHT on 6/21/22 (CMV +/+, Toxo -/-), course most recently complicated with rejection treated with pulse dose steroids 6.2023, then on higher cellcept ./tacr,  norovirus in July with now resolved diarrhea , admitted in september with non COVID coronavirus, now sent to ER with symptoms of cough malaise, muscle aches and fever to 101.3.       1. s/p OHT on 6/21/22 (CMV +/+, Toxo -/-), for NICM/ICM  simuleect induction, tacro, MMF  Complications ACR 6/2023 treated with pulse dose steroidis      2. Fever, Cough  positive COVID 19 contact, most likely viral URI/bronchitis +/- pneumonia from COVID 19    Recommendations  BC, UA, UC  FULL RVP  If COVID 19 positive, begin remdesivir 200mg x1 then daily for 3-5 days based on imaging andO2 requirments.   CMV PCr, EBV PCR in c/o fever  MRSA nares  CXR with low threshold for CT chest if COVID 19 pcr pos  Sputum cultures if feasible as patient occasionally has sputum   low grade leucocytosis and low pos procal suspect still viral  If imaging c/f superimposed pneumonia (and/or no clear viral culprit)  may consider cefepime /azithromycin and add pneumococcal antigen in urine. legionella Ag but favor holding off pending preliminary work up    Thank you for involving us in the care of this patient  Transplant ID will continue to follow  Please call or page with additional questions  Pager; #[phone]  Teams: from 8 am to 5 pm  Keke Escamilla MD         66M with ACC/AHA Stage D mixed NICM/ICM s/p PPM upgraded to CRT-D, CAD s/p PCI to mLAD 4/2022, eventually s/p OHT on 6/21/22 (CMV +/+, Toxo -/-), course most recently complicated with rejection treated with pulse dose steroids 6.2023, then on higher cellcept ./tacr,  norovirus in July with now resolved diarrhea , admitted in september with non COVID coronavirus, now sent to ER with symptoms of cough malaise, muscle aches and fever to 101.3.       1. s/p OHT on 6/21/22 (CMV +/+, Toxo -/-), for NICM/ICM  simuleect induction, tacro, MMF  Complications ACR 6/2023 treated with pulse dose steroidis      2. Fever, Cough  positive COVID 19 contact, most likely viral URI/bronchitis +/- pneumonia from COVID 19    Recommendations  BC, UA, UC  FULL RVP  If COVID 19 positive, begin remdesivir 200mg x1 then daily for 3-5 days based on imaging andO2 requirments.   CMV PCr, EBV PCR in c/o fever  MRSA nares  CXR with low threshold for CT chest if COVID 19 pcr pos  Sputum cultures if feasible as patient occasionally has sputum   low grade leucocytosis and low pos procal suspect still viral  If imaging c/f superimposed pneumonia (and/or no clear viral culprit)  may consider cefepime /azithromycin and add pneumococcal antigen in urine. legionella Ag but favor holding off pending preliminary work up    Thank you for involving us in the care of this patient  Transplant ID will continue to follow  Please call or page with additional questions  Pager; #[phone]  Teams: from 8 am to 5 pm  Keke Escamilla MD

## 2023-12-27 NOTE — ED ADULT TRIAGE NOTE - CHIEF COMPLAINT QUOTE
sore throat, cough, fever x 4 days  daughter at home with similar symptoms  hx of heart transplant 6/2022

## 2023-12-27 NOTE — ED PROVIDER NOTE - ATTENDING CONTRIBUTION TO CARE
I, Pb Woodard, have performed a history and physical exam on this patient, and discussed their management with the resident. I have fully participated in the care of this patient. I agree with the history, physical exam, and plan as documented by the resident

## 2023-12-27 NOTE — ED ADULT NURSE NOTE - NSFALLUNIVINTERV_ED_ALL_ED
Bed/Stretcher in lowest position, wheels locked, appropriate side rails in place/Call bell, personal items and telephone in reach/Instruct patient to call for assistance before getting out of bed/chair/stretcher/Non-slip footwear applied when patient is off stretcher/[place] to call system/Physically safe environment - no spills, clutter or unnecessary equipment/Purposeful proactive rounding/Room/bathroom lighting operational, light cord in reach Bed/Stretcher in lowest position, wheels locked, appropriate side rails in place/Call bell, personal items and telephone in reach/Instruct patient to call for assistance before getting out of bed/chair/stretcher/Non-slip footwear applied when patient is off stretcher/[place] to call system/Physically safe environment - no spills, clutter or unnecessary equipment/Purposeful proactive rounding/Room/bathroom lighting operational, light cord in reach

## 2023-12-28 DIAGNOSIS — A41.9 SEPSIS, UNSPECIFIED ORGANISM: ICD-10-CM

## 2023-12-28 DIAGNOSIS — U07.1 COVID-19: ICD-10-CM

## 2023-12-28 DIAGNOSIS — Z94.1 HEART TRANSPLANT STATUS: ICD-10-CM

## 2023-12-28 DIAGNOSIS — Z29.9 ENCOUNTER FOR PROPHYLACTIC MEASURES, UNSPECIFIED: ICD-10-CM

## 2023-12-28 DIAGNOSIS — N18.30 CHRONIC KIDNEY DISEASE, STAGE 3 UNSPECIFIED: ICD-10-CM

## 2023-12-28 DIAGNOSIS — I25.10 ATHEROSCLEROTIC HEART DISEASE OF NATIVE CORONARY ARTERY WITHOUT ANGINA PECTORIS: ICD-10-CM

## 2023-12-28 LAB
CMV DNA CSF QL NAA+PROBE: SIGNIFICANT CHANGE UP IU/ML
CMV DNA CSF QL NAA+PROBE: SIGNIFICANT CHANGE UP IU/ML
CMV DNA SPEC NAA+PROBE-LOG#: SIGNIFICANT CHANGE UP LOG10IU/ML
CMV DNA SPEC NAA+PROBE-LOG#: SIGNIFICANT CHANGE UP LOG10IU/ML

## 2023-12-28 PROCEDURE — 99233 SBSQ HOSP IP/OBS HIGH 50: CPT

## 2023-12-28 PROCEDURE — 99223 1ST HOSP IP/OBS HIGH 75: CPT | Mod: GC

## 2023-12-28 PROCEDURE — 71250 CT THORAX DX C-: CPT | Mod: 26

## 2023-12-28 RX ORDER — TACROLIMUS 5 MG/1
3 CAPSULE ORAL
Refills: 0 | Status: DISCONTINUED | OUTPATIENT
Start: 2023-12-28 | End: 2023-12-30

## 2023-12-28 RX ORDER — MAGNESIUM OXIDE 400 MG ORAL TABLET 241.3 MG
400 TABLET ORAL DAILY
Refills: 0 | Status: DISCONTINUED | OUTPATIENT
Start: 2023-12-28 | End: 2024-01-02

## 2023-12-28 RX ORDER — ENOXAPARIN SODIUM 100 MG/ML
40 INJECTION SUBCUTANEOUS EVERY 24 HOURS
Refills: 0 | Status: DISCONTINUED | OUTPATIENT
Start: 2023-12-28 | End: 2024-01-02

## 2023-12-28 RX ORDER — ASPIRIN/CALCIUM CARB/MAGNESIUM 324 MG
81 TABLET ORAL DAILY
Refills: 0 | Status: DISCONTINUED | OUTPATIENT
Start: 2023-12-28 | End: 2024-01-02

## 2023-12-28 RX ORDER — ATOVAQUONE 750 MG/5ML
10 SUSPENSION ORAL
Refills: 0 | DISCHARGE

## 2023-12-28 RX ORDER — SODIUM CHLORIDE 0.65 %
1 AEROSOL, SPRAY (ML) NASAL
Refills: 0 | Status: DISCONTINUED | OUTPATIENT
Start: 2023-12-28 | End: 2024-01-02

## 2023-12-28 RX ORDER — SENNA PLUS 8.6 MG/1
1 TABLET ORAL AT BEDTIME
Refills: 0 | Status: DISCONTINUED | OUTPATIENT
Start: 2023-12-28 | End: 2024-01-02

## 2023-12-28 RX ORDER — TACROLIMUS 5 MG/1
3 CAPSULE ORAL
Refills: 0 | Status: DISCONTINUED | OUTPATIENT
Start: 2023-12-28 | End: 2023-12-28

## 2023-12-28 RX ORDER — REMDESIVIR 5 MG/ML
INJECTION INTRAVENOUS
Refills: 0 | Status: COMPLETED | OUTPATIENT
Start: 2023-12-28 | End: 2024-01-01

## 2023-12-28 RX ORDER — ASPIRIN/CALCIUM CARB/MAGNESIUM 324 MG
1 TABLET ORAL
Refills: 0 | DISCHARGE

## 2023-12-28 RX ORDER — TACROLIMUS 5 MG/1
3 CAPSULE ORAL DAILY
Refills: 0 | Status: DISCONTINUED | OUTPATIENT
Start: 2023-12-28 | End: 2023-12-28

## 2023-12-28 RX ORDER — MYCOPHENOLATE MOFETIL 250 MG/1
4 CAPSULE ORAL
Refills: 0 | DISCHARGE

## 2023-12-28 RX ORDER — REMDESIVIR 5 MG/ML
200 INJECTION INTRAVENOUS EVERY 24 HOURS
Refills: 0 | Status: COMPLETED | OUTPATIENT
Start: 2023-12-28 | End: 2023-12-28

## 2023-12-28 RX ORDER — ROSUVASTATIN CALCIUM 5 MG/1
1 TABLET ORAL
Refills: 0 | DISCHARGE

## 2023-12-28 RX ORDER — NYSTATIN 500MM UNIT
5 POWDER (EA) MISCELLANEOUS
Refills: 0 | DISCHARGE

## 2023-12-28 RX ORDER — TACROLIMUS 5 MG/1
1.5 CAPSULE ORAL
Qty: 0 | Refills: 0 | DISCHARGE

## 2023-12-28 RX ORDER — NIFEDIPINE 30 MG
30 TABLET, EXTENDED RELEASE 24 HR ORAL DAILY
Refills: 0 | Status: DISCONTINUED | OUTPATIENT
Start: 2023-12-28 | End: 2023-12-28

## 2023-12-28 RX ORDER — METFORMIN HYDROCHLORIDE 850 MG/1
1 TABLET ORAL
Refills: 0 | DISCHARGE

## 2023-12-28 RX ORDER — ATORVASTATIN CALCIUM 80 MG/1
20 TABLET, FILM COATED ORAL AT BEDTIME
Refills: 0 | Status: DISCONTINUED | OUTPATIENT
Start: 2023-12-28 | End: 2024-01-02

## 2023-12-28 RX ORDER — CHLORHEXIDINE GLUCONATE 213 G/1000ML
1 SOLUTION TOPICAL DAILY
Refills: 0 | Status: DISCONTINUED | OUTPATIENT
Start: 2023-12-28 | End: 2024-01-02

## 2023-12-28 RX ORDER — FLUTICASONE PROPIONATE 50 MCG
1 SPRAY, SUSPENSION NASAL
Refills: 0 | Status: DISCONTINUED | OUTPATIENT
Start: 2023-12-28 | End: 2024-01-02

## 2023-12-28 RX ORDER — MAGNESIUM OXIDE 400 MG ORAL TABLET 241.3 MG
1 TABLET ORAL
Refills: 0 | DISCHARGE

## 2023-12-28 RX ORDER — NIFEDIPINE 30 MG
1 TABLET, EXTENDED RELEASE 24 HR ORAL
Refills: 0 | DISCHARGE

## 2023-12-28 RX ORDER — REMDESIVIR 5 MG/ML
100 INJECTION INTRAVENOUS EVERY 24 HOURS
Refills: 0 | Status: COMPLETED | OUTPATIENT
Start: 2023-12-29 | End: 2024-01-01

## 2023-12-28 RX ORDER — NIFEDIPINE 30 MG
30 TABLET, EXTENDED RELEASE 24 HR ORAL EVERY 24 HOURS
Refills: 0 | Status: DISCONTINUED | OUTPATIENT
Start: 2023-12-28 | End: 2024-01-02

## 2023-12-28 RX ORDER — MYCOPHENOLATE MOFETIL 250 MG/1
500 CAPSULE ORAL
Refills: 0 | Status: DISCONTINUED | OUTPATIENT
Start: 2023-12-28 | End: 2023-12-28

## 2023-12-28 RX ORDER — DAPAGLIFLOZIN 10 MG/1
1 TABLET, FILM COATED ORAL
Refills: 0 | DISCHARGE

## 2023-12-28 RX ORDER — ACETAMINOPHEN 500 MG
650 TABLET ORAL EVERY 6 HOURS
Refills: 0 | Status: DISCONTINUED | OUTPATIENT
Start: 2023-12-28 | End: 2024-01-02

## 2023-12-28 RX ADMIN — MAGNESIUM OXIDE 400 MG ORAL TABLET 400 MILLIGRAM(S): 241.3 TABLET ORAL at 12:14

## 2023-12-28 RX ADMIN — Medication 100 MILLIGRAM(S): at 23:01

## 2023-12-28 RX ADMIN — MYCOPHENOLATE MOFETIL 500 MILLIGRAM(S): 250 CAPSULE ORAL at 00:18

## 2023-12-28 RX ADMIN — Medication 81 MILLIGRAM(S): at 12:15

## 2023-12-28 RX ADMIN — Medication 1 TABLET(S): at 12:15

## 2023-12-28 RX ADMIN — Medication 600 MILLIGRAM(S): at 06:43

## 2023-12-28 RX ADMIN — Medication 100 MILLIGRAM(S): at 06:43

## 2023-12-28 RX ADMIN — REMDESIVIR 200 MILLIGRAM(S): 5 INJECTION INTRAVENOUS at 11:33

## 2023-12-28 RX ADMIN — TACROLIMUS 3 MILLIGRAM(S): 5 CAPSULE ORAL at 09:52

## 2023-12-28 RX ADMIN — Medication 30 MILLIGRAM(S): at 14:05

## 2023-12-28 RX ADMIN — Medication 1 SPRAY(S): at 19:47

## 2023-12-28 RX ADMIN — CHLORHEXIDINE GLUCONATE 1 APPLICATION(S): 213 SOLUTION TOPICAL at 12:16

## 2023-12-28 RX ADMIN — Medication 975 MILLIGRAM(S): at 07:55

## 2023-12-28 RX ADMIN — Medication 10 MILLIGRAM(S): at 19:48

## 2023-12-28 RX ADMIN — Medication 650 MILLIGRAM(S): at 19:47

## 2023-12-28 RX ADMIN — Medication 600 MILLIGRAM(S): at 19:47

## 2023-12-28 RX ADMIN — MYCOPHENOLATE MOFETIL 500 MILLIGRAM(S): 250 CAPSULE ORAL at 10:17

## 2023-12-28 RX ADMIN — Medication 10 MILLIGRAM(S): at 10:18

## 2023-12-28 NOTE — H&P ADULT - ATTENDING COMMENTS
Patient seen and examined at bedside. Found to be COVID-19 positive. Septic, but SOB and tachycardia have improved since arrival. Will continue with remdesivir and symptomatic treatment. Will hold off on decadron in already immunocompromised patient pending further infectious workup to r/o other possible causes of sepsis.

## 2023-12-28 NOTE — CONSULT NOTE ADULT - SUBJECTIVE AND OBJECTIVE BOX
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    States that his symptoms started on Sunday with slow onset of fever, chills, and a congested nose. He denies travel history but endorses sick contacts with his daughter also testing positive for COVID. He denies headaches, chest pain, palpitations, n/v/c/d. He does have sputum production intermittently with a cough. Otherwise no other complaints.     In ED, vitals with fever to 101.3, tachycardia. Labs significant for leukocytosis, elevated alkp, lactate 2.2 on VBG, RVP+COVID. Received Remdesivir and 250cc fluid bolus.        PMHx:   AV block    Essential hypertension    Pure hypercholesterolemia    Myocardial infarct, old    Chronic HFrEF (heart failure with reduced ejection fraction)    Chronic kidney disease, unspecified CKD stage    CAD (coronary artery disease)    HLD (hyperlipidemia)    Type 2 diabetes mellitus    History of heart transplant    Need for prophylactic measure        PSHx:   Artificial cardiac pacemaker    History of open heart surgery        Allergies:  No Known Allergies      Home Meds:  · 	Mepron 750 mg/5 mL oral suspension: 10 milliliter(s) orally once a day  · 	metFORMIN 500 mg oral tablet: 1 tab(s) orally once a day  · 	Multiple Vitamins oral tablet: 1 tab(s) orally once a day  · 	rosuvastatin 5 mg oral tablet: 1 tab(s) orally once a day  · 	magnesium oxide 400 mg oral tablet: 1 tab(s) orally once a day  · 	Aspirin Enteric Coated 81 mg oral delayed release tablet: 1 tab(s) orally once a day  · 	Farxiga 5 mg oral tablet: 1 tab(s) orally once a day  · 	NIFEdipine 30 mg oral tablet, extended release: 1 tab(s) orally once a day  · 	sildenafil 20 mg oral tablet: 0.5 tab(s) orally 2 times a day  · 	nystatin 100,000 units/mL oral suspension: 5 milliliter(s) orally 4 times a day  · 	CellCept 250 mg oral capsule: 4 tab(s) orally 2 times a day  · 	tacrolimus 1 mg oral capsule: 1.5 tab(s) orally 2 times a day      Current Medications:   acetaminophen     Tablet .. 650 milliGRAM(s) Oral every 6 hours PRN  aspirin enteric coated 81 milliGRAM(s) Oral daily  atorvastatin 20 milliGRAM(s) Oral at bedtime  benzonatate 100 milliGRAM(s) Oral every 8 hours  chlorhexidine 2% Cloths 1 Application(s) Topical daily  enoxaparin Injectable 40 milliGRAM(s) SubCutaneous every 24 hours  fluticasone propionate 50 MICROgram(s)/spray Nasal Spray 1 Spray(s) Both Nostrils two times a day  guaiFENesin  milliGRAM(s) Oral every 12 hours  magnesium oxide 400 milliGRAM(s) Oral daily  multivitamin 1 Tablet(s) Oral daily  NIFEdipine XL 30 milliGRAM(s) Oral every 24 hours  remdesivir  IVPB   IV Intermittent   senna 1 Tablet(s) Oral at bedtime  sildenafil (REVATIO) 10 milliGRAM(s) Oral two times a day  sodium chloride 0.65% Nasal 1 Spray(s) Both Nostrils four times a day PRN  tacrolimus ER Tablet (ENVARSUS XR) 3 milliGRAM(s) Oral <User Schedule>      FAMILY HISTORY:  Family history of acute myocardial infarction (Father)  72        Social History:      REVIEW OF SYSTEMS:  Constitutional:     [ ] negative [ ] fevers [ ] chills [ ] weight loss [ ] weight gain  HEENT:                  [ ] negative [ ] dry eyes [ ] eye irritation [ ] postnasal drip [ ] nasal congestion  CV:                         [ ] negative  [ ] chest pain [ ] orthopnea [ ] palpitations [ ] murmur  Resp:                     [ ] negative [ ] cough [ ] shortness of breath [ ] dyspnea [ ] wheezing [ ] sputum [ ]hemoptysis  GI:                          [ ] negative [ ] nausea [ ] vomiting [ ] diarrhea [ ] constipation [ ] abd pain [ ] dysphagia   :                        [ ] negative [ ] dysuria [ ] nocturia [ ] hematuria [ ] increased urinary frequency  Musculoskeletal: [ ] negative [ ] back pain [ ] myalgias [ ] arthralgias [ ] fracture  Skin:                       [ ] negative [ ] rash [ ] itch  Neurological:        [ ] negative [ ] headache [ ] dizziness [ ] syncope [ ] weakness [ ] numbness  Psychiatric:           [ ] negative [ ] anxiety [ ] depression  Endocrine:            [ ] negative [ ] diabetes [ ] thyroid problem  Heme/Lymph:      [ ] negative [ ] anemia [ ] bleeding problem  Allergic/Immune: [ ] negative [ ] itchy eyes [ ] nasal discharge [ ] hives [ ] angioedema    [ ] All other systems negative  [ ] Unable to assess ROS due to      Physical Exam:  T(F): 98.7 (12-28), Max: 101.3 (12-27)  HR: 113 (12-28) (105 - 136)  BP: 131/87 (12-28) (108/72 - 131/89)  RR: 18 (12-28)  SpO2: 99% (12-28)  GENERAL: No acute distress, well-developed  HEAD:  Atraumatic, Normocephalic  ENT: EOMI, PERRLA, conjunctiva and sclera clear, Neck supple, No JVD, moist mucosa  CHEST/LUNG: Clear to auscultation bilaterally; No wheeze, equal breath sounds bilaterally   BACK: No spinal tenderness  HEART: Regular rhythm; tachycardic No murmurs, rubs, or gallops  ABDOMEN: Soft, Nontender, Nondistended; Bowel sounds present  EXTREMITIES:  No clubbing, cyanosis, or edema  PSYCH: Nl behavior, nl affect  NEUROLOGY: AAOx3, non-focal, cranial nerves intact  SKIN: Normal color, No rashes or lesions    LINES:    Cardiovascular Diagnostic Testing:    ECG: Personally reviewed: sinus tachycardia    Echo: Personally reviewed:    TRANSTHORACIC ECHOCARDIOGRAM REPORT  ________________________________________________________________________________                                      _______       Pt. Name:       LENNOX GOCOOL Study Date:    10/4/2023  MRN:            [id number]    YOB: 1955  Accession #:    [id number]     Age:           67 years  Account#:       [id number]  Gender:        M  Heart Rate:     94 bpm        Height:        70.00 in (177.80 cm)  Rhythm:         sinus rhythm  Weight:        134.00 lb (60.78 kg)  Blood Pressure: 128/90 mmHg   BSA/BMI:       1.76 m² / 19.23 kg/m²  ________________________________________________________________________________________  Referring Physician:    [id number] Ariela Blevins  Interpreting Physician: Matthew Hernandez MD  Primary Sonographer:    Felipa Chicas [facility]    CPT:               MYOCARDIAL STRAIN IMAGING - 12582.m;ECHO TTE WO CON COMP W                     DOPP - 90758.m;3D RECONST W/O WKSTATION - 15775.m  Indication(s):     Heart transplant status - Z94.1  Procedure:         Transthoracic echocardiogram with 2-D, M-mode and complete                     spectral and color flow Doppler. Strain imaging performed for                     evaluation of regional and global myocardial shape and                 dimensions. Real time and full volume 3-dimensional imaging                     performed at the echo machine.  Ordering Location: [facility]  Admission Status:  Outpatient  Study Information: Image quality for this study is adequate.    _______________________________________________________________________________________     CONCLUSIONS:      1. Septal motion is abnormal consistent with previous cardiac surgery. Left ventricular systolic function is normal with an ejection fraction of 62 % by Mehta's method of disks. There are no regional wall motion abnormalities seen.   2. Left ventricular global longitudinal strain is -15.6 % is abnormal (> -16%). Images were acquired on a [employer] ultrasound system and processed using Ninite strain analysis software with a heart rate of 94 bpm and a blood pressure of 128/90 mmHg.   3. Mildly enlarged right ventricular cavity size, wall thickness and reduced systolic function.   4. Trace mitral regurgitation.   5. Trileaflet aortic valvewith normal systolic excursion.   6. No evidence of aortic regurgitation.   7. Compared to the transthoracic echocardiogram performed on 9/14/2023 findings are similar on today's study. Right ventricular systolic function was noted to be reduced on prior studies.    ________________________________________________________________________________________      Imaging:    CXR: Personally reviewed    Labs: Personally reviewed                        15.2   12.50 )-----------( 237      ( 27 Dec 2023 17:42 )             46.5     12-27    136  |  97  |  18  ----------------------------<  142<H>  4.1   |  26  |  1.30    Ca    10.1      27 Dec 2023 17:42    TPro  7.7  /  Alb  4.5  /  TBili  0.8  /  DBili  x   /  AST  15  /  ALT  12  /  AlkPhos  123<H>  12-27    PT/INR - ( 27 Dec 2023 17:42 )   PT: 11.9 sec;   INR: 1.08 ratio         PTT - ( 27 Dec 2023 17:42 )  PTT:30.2 sec            CARDIAC MARKERS ( 27 Dec 2023 17:42 )  19 ng/L / x     / x     / x     / x     / x          No Known Allergies    acetaminophen     Tablet .. 650 milliGRAM(s) Oral every 6 hours PRN  aspirin enteric coated 81 milliGRAM(s) Oral daily  atorvastatin 20 milliGRAM(s) Oral at bedtime  benzonatate 100 milliGRAM(s) Oral every 8 hours  chlorhexidine 2% Cloths 1 Application(s) Topical daily  enoxaparin Injectable 40 milliGRAM(s) SubCutaneous every 24 hours  fluticasone propionate 50 MICROgram(s)/spray Nasal Spray 1 Spray(s) Both Nostrils two times a day  guaiFENesin  milliGRAM(s) Oral every 12 hours  magnesium oxide 400 milliGRAM(s) Oral daily  multivitamin 1 Tablet(s) Oral daily  NIFEdipine XL 30 milliGRAM(s) Oral every 24 hours  remdesivir  IVPB   IV Intermittent   senna 1 Tablet(s) Oral at bedtime  sildenafil (REVATIO) 10 milliGRAM(s) Oral two times a day  sodium chloride 0.65% Nasal 1 Spray(s) Both Nostrils four times a day PRN  tacrolimus ER Tablet (ENVARSUS XR) 3 milliGRAM(s) Oral <User Schedule>    T(F): 98.7 (12-28), Max: 101.3 (12-27)  HR: 113 (12-28) (105 - 136)  BP: 131/87 (12-28) (108/72 - 131/89)  RR: 18 (12-28)  SpO2: 99% (12-28) Patient seen and evaluated at bedside    Chief Complaint: Fever    HPI:  68 year old male w/ history of mixed NICM/ICM (likely familial with strong FH and early arrhythmia history in his 30's) LVEF 10-15% s/p PPM upgraded to CRT-D c/b VT now s/p OHT (6/21/22), CKD3 presenting for fever, cough, congestion and sore throat for several days.    States that his symptoms started on Sunday with slow onset of fever, chills, and a congested nose. He denies travel history but endorses sick contacts with his daughter also testing positive for COVID. He denies headaches, chest pain, palpitations, n/v/c/d. He does have sputum production intermittently with a cough. Otherwise no other complaints.     In ED, vitals with fever to 101.3, tachycardia. Labs significant for leukocytosis, elevated alkp, lactate 2.2 on VBG, RVP+COVID. Received Remdesivir and 250cc fluid bolus.        PMHx:   AV block    Essential hypertension    Pure hypercholesterolemia    Myocardial infarct, old    Chronic HFrEF (heart failure with reduced ejection fraction)    Chronic kidney disease, unspecified CKD stage    CAD (coronary artery disease)    HLD (hyperlipidemia)    Type 2 diabetes mellitus    History of heart transplant    Need for prophylactic measure        PSHx:   Artificial cardiac pacemaker    History of open heart surgery        Allergies:  No Known Allergies      Home Meds:  · 	Mepron 750 mg/5 mL oral suspension: 10 milliliter(s) orally once a day  · 	metFORMIN 500 mg oral tablet: 1 tab(s) orally once a day  · 	Multiple Vitamins oral tablet: 1 tab(s) orally once a day  · 	rosuvastatin 5 mg oral tablet: 1 tab(s) orally once a day  · 	magnesium oxide 400 mg oral tablet: 1 tab(s) orally once a day  · 	Aspirin Enteric Coated 81 mg oral delayed release tablet: 1 tab(s) orally once a day  · 	Farxiga 5 mg oral tablet: 1 tab(s) orally once a day  · 	NIFEdipine 30 mg oral tablet, extended release: 1 tab(s) orally once a day  · 	sildenafil 20 mg oral tablet: 0.5 tab(s) orally 2 times a day  · 	nystatin 100,000 units/mL oral suspension: 5 milliliter(s) orally 4 times a day  · 	CellCept 250 mg oral capsule: 4 tab(s) orally 2 times a day  · 	tacrolimus 1 mg oral capsule: 1.5 tab(s) orally 2 times a day      Current Medications:   acetaminophen     Tablet .. 650 milliGRAM(s) Oral every 6 hours PRN  aspirin enteric coated 81 milliGRAM(s) Oral daily  atorvastatin 20 milliGRAM(s) Oral at bedtime  benzonatate 100 milliGRAM(s) Oral every 8 hours  chlorhexidine 2% Cloths 1 Application(s) Topical daily  enoxaparin Injectable 40 milliGRAM(s) SubCutaneous every 24 hours  fluticasone propionate 50 MICROgram(s)/spray Nasal Spray 1 Spray(s) Both Nostrils two times a day  guaiFENesin  milliGRAM(s) Oral every 12 hours  magnesium oxide 400 milliGRAM(s) Oral daily  multivitamin 1 Tablet(s) Oral daily  NIFEdipine XL 30 milliGRAM(s) Oral every 24 hours  remdesivir  IVPB   IV Intermittent   senna 1 Tablet(s) Oral at bedtime  sildenafil (REVATIO) 10 milliGRAM(s) Oral two times a day  sodium chloride 0.65% Nasal 1 Spray(s) Both Nostrils four times a day PRN  tacrolimus ER Tablet (ENVARSUS XR) 3 milliGRAM(s) Oral <User Schedule>      FAMILY HISTORY:  Family history of acute myocardial infarction (Father)  72        Social History:      REVIEW OF SYSTEMS:  Constitutional:     [ ] negative [ ] fevers [ ] chills [ ] weight loss [ ] weight gain  HEENT:                  [ ] negative [ ] dry eyes [ ] eye irritation [ ] postnasal drip [ ] nasal congestion  CV:                         [ ] negative  [ ] chest pain [ ] orthopnea [ ] palpitations [ ] murmur  Resp:                     [ ] negative [ ] cough [ ] shortness of breath [ ] dyspnea [ ] wheezing [ ] sputum [ ]hemoptysis  GI:                          [ ] negative [ ] nausea [ ] vomiting [ ] diarrhea [ ] constipation [ ] abd pain [ ] dysphagia   :                        [ ] negative [ ] dysuria [ ] nocturia [ ] hematuria [ ] increased urinary frequency  Musculoskeletal: [ ] negative [ ] back pain [ ] myalgias [ ] arthralgias [ ] fracture  Skin:                       [ ] negative [ ] rash [ ] itch  Neurological:        [ ] negative [ ] headache [ ] dizziness [ ] syncope [ ] weakness [ ] numbness  Psychiatric:           [ ] negative [ ] anxiety [ ] depression  Endocrine:            [ ] negative [ ] diabetes [ ] thyroid problem  Heme/Lymph:      [ ] negative [ ] anemia [ ] bleeding problem  Allergic/Immune: [ ] negative [ ] itchy eyes [ ] nasal discharge [ ] hives [ ] angioedema    [ ] All other systems negative  [ ] Unable to assess ROS due to      Physical Exam:  T(F): 98.7 (12-28), Max: 101.3 (12-27)  HR: 113 (12-28) (105 - 136)  BP: 131/87 (12-28) (108/72 - 131/89)  RR: 18 (12-28)  SpO2: 99% (12-28)  GENERAL: No acute distress, well-developed  HEAD:  Atraumatic, Normocephalic  ENT: EOMI, PERRLA, conjunctiva and sclera clear, Neck supple, No JVD, moist mucosa  CHEST/LUNG: Clear to auscultation bilaterally; No wheeze, equal breath sounds bilaterally   BACK: No spinal tenderness  HEART: Regular rhythm; tachycardic No murmurs, rubs, or gallops  ABDOMEN: Soft, Nontender, Nondistended; Bowel sounds present  EXTREMITIES:  No clubbing, cyanosis, or edema  PSYCH: Nl behavior, nl affect  NEUROLOGY: AAOx3, non-focal, cranial nerves intact  SKIN: Normal color, No rashes or lesions    LINES:    Cardiovascular Diagnostic Testing:    ECG: Personally reviewed: sinus tachycardia    Echo: Personally reviewed:    TRANSTHORACIC ECHOCARDIOGRAM REPORT  ________________________________________________________________________________                                      _______       Pt. Name:       LENNOX GOCOOL Study Date:    10/4/2023  MRN:            [id number]    YOB: 1955  Accession #:    [id number]     Age:           67 years  Account#:       [id number]  Gender:        M  Heart Rate:     94 bpm        Height:        70.00 in (177.80 cm)  Rhythm:         sinus rhythm  Weight:        134.00 lb (60.78 kg)  Blood Pressure: 128/90 mmHg   BSA/BMI:       1.76 m² / 19.23 kg/m²  ________________________________________________________________________________________  Referring Physician:    [id number] Ariela Blevins  Interpreting Physician: Matthew Hernandez MD  Primary Sonographer:    Felipa Chicas [facility]    CPT:               MYOCARDIAL STRAIN IMAGING - 24821.m;ECHO TTE WO CON COMP W                     DOPP - 34887.m;3D RECONST W/O WKSTATION - 31518.m  Indication(s):     Heart transplant status - Z94.1  Procedure:         Transthoracic echocardiogram with 2-D, M-mode and complete                     spectral and color flow Doppler. Strain imaging performed for                     evaluation of regional and global myocardial shape and                 dimensions. Real time and full volume 3-dimensional imaging                     performed at the echo machine.  Ordering Location: [facility]  Admission Status:  Outpatient  Study Information: Image quality for this study is adequate.    _______________________________________________________________________________________     CONCLUSIONS:      1. Septal motion is abnormal consistent with previous cardiac surgery. Left ventricular systolic function is normal with an ejection fraction of 62 % by Mehta's method of disks. There are no regional wall motion abnormalities seen.   2. Left ventricular global longitudinal strain is -15.6 % is abnormal (> -16%). Images were acquired on a [employer] ultrasound system and processed using Draftster strain analysis software with a heart rate of 94 bpm and a blood pressure of 128/90 mmHg.   3. Mildly enlarged right ventricular cavity size, wall thickness and reduced systolic function.   4. Trace mitral regurgitation.   5. Trileaflet aortic valvewith normal systolic excursion.   6. No evidence of aortic regurgitation.   7. Compared to the transthoracic echocardiogram performed on 9/14/2023 findings are similar on today's study. Right ventricular systolic function was noted to be reduced on prior studies.    ________________________________________________________________________________________      Imaging:    CXR: Personally reviewed    Labs: Personally reviewed                        15.2   12.50 )-----------( 237      ( 27 Dec 2023 17:42 )             46.5     12-27    136  |  97  |  18  ----------------------------<  142<H>  4.1   |  26  |  1.30    Ca    10.1      27 Dec 2023 17:42    TPro  7.7  /  Alb  4.5  /  TBili  0.8  /  DBili  x   /  AST  15  /  ALT  12  /  AlkPhos  123<H>  12-27    PT/INR - ( 27 Dec 2023 17:42 )   PT: 11.9 sec;   INR: 1.08 ratio         PTT - ( 27 Dec 2023 17:42 )  PTT:30.2 sec            CARDIAC MARKERS ( 27 Dec 2023 17:42 )  19 ng/L / x     / x     / x     / x     / x          No Known Allergies    acetaminophen     Tablet .. 650 milliGRAM(s) Oral every 6 hours PRN  aspirin enteric coated 81 milliGRAM(s) Oral daily  atorvastatin 20 milliGRAM(s) Oral at bedtime  benzonatate 100 milliGRAM(s) Oral every 8 hours  chlorhexidine 2% Cloths 1 Application(s) Topical daily  enoxaparin Injectable 40 milliGRAM(s) SubCutaneous every 24 hours  fluticasone propionate 50 MICROgram(s)/spray Nasal Spray 1 Spray(s) Both Nostrils two times a day  guaiFENesin  milliGRAM(s) Oral every 12 hours  magnesium oxide 400 milliGRAM(s) Oral daily  multivitamin 1 Tablet(s) Oral daily  NIFEdipine XL 30 milliGRAM(s) Oral every 24 hours  remdesivir  IVPB   IV Intermittent   senna 1 Tablet(s) Oral at bedtime  sildenafil (REVATIO) 10 milliGRAM(s) Oral two times a day  sodium chloride 0.65% Nasal 1 Spray(s) Both Nostrils four times a day PRN  tacrolimus ER Tablet (ENVARSUS XR) 3 milliGRAM(s) Oral <User Schedule>    T(F): 98.7 (12-28), Max: 101.3 (12-27)  HR: 113 (12-28) (105 - 136)  BP: 131/87 (12-28) (108/72 - 131/89)  RR: 18 (12-28)  SpO2: 99% (12-28)

## 2023-12-28 NOTE — PROGRESS NOTE ADULT - ASSESSMENT
66M with ACC/AHA Stage D mixed NICM/ICM s/p PPM upgraded to CRT-D, CAD s/p PCI to mLAD 4/2022, eventually s/p OHT on 6/21/22 (CMV +/+, Toxo -/-), course most recently complicated with rejection treated with pulse dose steroids 6.2023, then on higher cellcept ./tacr,  norovirus in July with now resolved diarrhea , admitted in september with non COVID coronavirus, now sent to ER with symptoms of cough malaise, muscle aches and fever to 101.3.       1. s/p OHT on 6/21/22 (CMV +/+, Toxo -/-), for NICM/ICM  simuleect induction, tacro, MMF  Complications ACR 6/2023 treated with pulse dose steroidis      2. Fever, Cough  positive COVID 19 contact, most likely viral URI/bronchitis +/- pneumonia from COVID 19    Recommendations  Follow BC  Would still send CMV PCR as it can reactive with coinfection  Continue remdesivir- currently on RA but favor CT cehst to rule out LRTI in which case would favor 5 days of remdesivir over 3    Thank you for involving us in the care of this patient  Transplant ID will continue to follow  Please call or page with additional questions  Pager; #[phone]  Teams: from 8 am to 5 pm  Keke Escamilla MD         66M with ACC/AHA Stage D mixed NICM/ICM s/p PPM upgraded to CRT-D, CAD s/p PCI to mLAD 4/2022, eventually s/p OHT on 6/21/22 (CMV +/+, Toxo -/-), course most recently complicated with rejection treated with pulse dose steroids 6.2023, then on higher cellcept ./tacr,  norovirus in July with now resolved diarrhea , admitted in september with non COVID coronavirus, now sent to ER with symptoms of cough malaise, muscle aches and fever to 101.3.       1. s/p OHT on 6/21/22 (CMV +/+, Toxo -/-), for NICM/ICM  simuleect induction, tacro, MMF  Complications ACR 6/2023 treated with pulse dose steroidis      2. Fever, Cough  positive COVID 19 contact, most likely viral URI/bronchitis +/- pneumonia from COVID 19    Recommendations  Follow BC  Would still send CMV PCR as it can reactive with coinfection  Continue remdesivir- currently on RA but favor CT cehst to rule out LRTI in which case would favor 5 days of remdesivir over 3    Thank you for involving us in the care of this patient  Transplant ID will continue to follow  Please call or page with additional questions  Pager; #[phone]  Teams: from 8 am to 5 pm  Keke Escamilla MD

## 2023-12-28 NOTE — CONSULT NOTE ADULT - PROBLEM SELECTOR RECOMMENDATION 2
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

## 2023-12-28 NOTE — H&P ADULT - TIME BILLING
Independently obtaining a history, performing a physical examination, discussing the plan with the patient, ordering medications/tests, documenting clinical information, and coordinating care.    77 minutes spent on total encounter which excludes time spent teaching.

## 2023-12-28 NOTE — PROGRESS NOTE ADULT - SUBJECTIVE AND OBJECTIVE BOX
Routing refill request to provider for review/approval because:  Katelynn given x1 and patient did not follow up, please advise  Pt has been sent at least several notificatios to schedule.  Last Ov 3/2021.    CCing TC to reach out to schedule.  Britney Scott, RN  [facility] RN Triage Team           66M with ACC/AHA Stage D mixed NICM/ICM s/p PPM upgraded to CRT-D, CAD s/p PCI to mLAD 4/2022, eventually s/p OHT on 6/21/22 (CMV +/+, Toxo -/-), course most recently complicated with rejection treated with pulse dose steroids 6.2023, then on higher cellcept ./tacr,  norovirus in July with now resolved diarrhea , admitted in september with non COVID coronavirus, now sent to ER with COVID 19    Feeling horrible, with coogh, congestion no diarrhea- remdesivir started    Vital Signs Last 24 Hrs  T(C): 37.1 (28 Dec 2023 09:58), Max: 38.5 (27 Dec 2023 18:57)  T(F): 98.7 (28 Dec 2023 09:58), Max: 101.3 (27 Dec 2023 18:57)  HR: 113 (28 Dec 2023 09:58) (105 - 127)  BP: 131/87 (28 Dec 2023 09:58) (108/72 - 131/89)  BP(mean): 103 (28 Dec 2023 06:00) (84 - 103)  RR: 18 (28 Dec 2023 09:58) (18 - 22)  SpO2: 99% (28 Dec 2023 09:58) (98% - 99%)    Parameters below as of 28 Dec 2023 09:58  Patient On (Oxygen Delivery Method): room air      CONSTITUTIONAL: Well groomed, no apparent distress  EYES: PERRLA and symmetric, EOMI, No conjunctival or scleral injection, non-icteric  ENMT: Oral mucosa with moist membranes. Normal dentition; no pharyngeal injection or exudates             NECK: Supple, symmetric and without tracheal deviation   RESP: No respiratory distress, no use of accessory muscles; CTA b/l, no WRR  CV: RRR, +S1S2, no MRG; no JVD; no peripheral edema  GI: Soft, NT, ND, no rebound, no guarding; no palpable masses; no hepatosplenomegaly; no hernia palpated  LYMPH: No cervical LAD or tenderness; no axillary LAD or tenderness; no inguinal LAD or tenderness  MSK: Normal gait; No digital clubbing or cyanosis; examination of the (head/neck/spine/ribs/pelvis, RUE, LUE, RLE, LLE) without misalignment,            Normal ROM without pain, no spinal tenderness, normal muscle strength/tone  SKIN: No rashes or ulcers noted; no subcutaneous nodules or induration palpable          ____________________________________________________  ROS  GENERAL: denies chills, , night sweats, weight loss.   PSYCH: denies depression, anxiety, suicidal ideation, hallucination, and delusions  SKIN: no rash or lesions; no color changes, no abnormal nevi,no  dryness, and nojaundice    EYES: denies visual changes, floaters, pain, inflammation, blurred vision, and discharge  ENT: denies tinnitus, vertigo, epistaxis, oral lesion, and decreased acuity  PULM: denies, hemoptysis, pleurisy  CVS: denies angina, palpitations,+ orthopnea, no syncope, or heart murmur  GI: denies constipation, diarrhea, melena, abdominal pain, nausea.   : denies dysuria, frequency, discharge, incontinence, stones or macroscopic hematuria  MS: no arthralgias, no erythema or swelling, no myalgias, noedema, or lower back pain.   CNS: denies numbness, dizziness, seizure, or tremor  ENDO: denies heat/cold intolerance, polyuria, polydipsia, malaise.    HEME: denies bruising, bleeding, lymphadenopathy, anemia, and calf pain    Allergies  No Known Allergies    __________________________________________________  MEDS:  MEDICATIONS  (STANDING):  acetaminophen     Tablet .. 650 every 6 hours PRN  aspirin enteric coated 81 daily  atorvastatin 20 at bedtime  benzonatate 100 every 8 hours  enoxaparin Injectable 40 every 24 hours  guaiFENesin  every 12 hours  NIFEdipine XL 30 every 24 hours  senna 1 at bedtime  sildenafil (REVATIO) 10 two times a day  tacrolimus ER Tablet (ENVARSUS XR) 3 <User Schedule>    _________________________________________________  ANTIMICOBIALS  remdesivir  IVPB    tacrolimus ER Tablet (ENVARSUS XR) 3 <User Schedule>      GENERAL LABS              15.2                 136  | 26   | 18           12.50 >-----------< 237     ------------------------< 142                   46.5                 4.1  | 97   | 1.30                                         Ca 10.1  Mg x     Ph x          Urinalysis Basic - ( 27 Dec 2023 17:42 )    Color: x / Appearance: x / SG: x / pH: x  Gluc: 142 mg/dL / Ketone: x  / Bili: x / Urobili: x   Blood: x / Protein: x / Nitrite: x   Leuk Esterase: x / RBC: x / WBC x   Sq Epi: x / Non Sq Epi: x / Bacteria: x        _________________________________________________  MICROBIOLOGY  -----------          Rapid RVP Result: Detected (12-27 @ 17:56)  CMVPCR Log: NotDetec [id number]/mL (12-27 @ 17:42)          Fungitell:   _______________________________________________  PERTINENT IMAGING         66M with ACC/AHA Stage D mixed NICM/ICM s/p PPM upgraded to CRT-D, CAD s/p PCI to mLAD 4/2022, eventually s/p OHT on 6/21/22 (CMV +/+, Toxo -/-), course most recently complicated with rejection treated with pulse dose steroids 6.2023, then on higher cellcept ./tacr,  norovirus in July with now resolved diarrhea , admitted in september with non COVID coronavirus, now sent to ER with COVID 19    Feeling horrible, with coogh, congestion no diarrhea- remdesivir started    Vital Signs Last 24 Hrs  T(C): 37.1 (28 Dec 2023 09:58), Max: 38.5 (27 Dec 2023 18:57)  T(F): 98.7 (28 Dec 2023 09:58), Max: 101.3 (27 Dec 2023 18:57)  HR: 113 (28 Dec 2023 09:58) (105 - 127)  BP: 131/87 (28 Dec 2023 09:58) (108/72 - 131/89)  BP(mean): 103 (28 Dec 2023 06:00) (84 - 103)  RR: 18 (28 Dec 2023 09:58) (18 - 22)  SpO2: 99% (28 Dec 2023 09:58) (98% - 99%)    Parameters below as of 28 Dec 2023 09:58  Patient On (Oxygen Delivery Method): room air      CONSTITUTIONAL: Well groomed, no apparent distress  EYES: PERRLA and symmetric, EOMI, No conjunctival or scleral injection, non-icteric  ENMT: Oral mucosa with moist membranes. Normal dentition; no pharyngeal injection or exudates             NECK: Supple, symmetric and without tracheal deviation   RESP: No respiratory distress, no use of accessory muscles; CTA b/l, no WRR  CV: RRR, +S1S2, no MRG; no JVD; no peripheral edema  GI: Soft, NT, ND, no rebound, no guarding; no palpable masses; no hepatosplenomegaly; no hernia palpated  LYMPH: No cervical LAD or tenderness; no axillary LAD or tenderness; no inguinal LAD or tenderness  MSK: Normal gait; No digital clubbing or cyanosis; examination of the (head/neck/spine/ribs/pelvis, RUE, LUE, RLE, LLE) without misalignment,            Normal ROM without pain, no spinal tenderness, normal muscle strength/tone  SKIN: No rashes or ulcers noted; no subcutaneous nodules or induration palpable          ____________________________________________________  ROS  GENERAL: denies chills, , night sweats, weight loss.   PSYCH: denies depression, anxiety, suicidal ideation, hallucination, and delusions  SKIN: no rash or lesions; no color changes, no abnormal nevi,no  dryness, and nojaundice    EYES: denies visual changes, floaters, pain, inflammation, blurred vision, and discharge  ENT: denies tinnitus, vertigo, epistaxis, oral lesion, and decreased acuity  PULM: denies, hemoptysis, pleurisy  CVS: denies angina, palpitations,+ orthopnea, no syncope, or heart murmur  GI: denies constipation, diarrhea, melena, abdominal pain, nausea.   : denies dysuria, frequency, discharge, incontinence, stones or macroscopic hematuria  MS: no arthralgias, no erythema or swelling, no myalgias, noedema, or lower back pain.   CNS: denies numbness, dizziness, seizure, or tremor  ENDO: denies heat/cold intolerance, polyuria, polydipsia, malaise.    HEME: denies bruising, bleeding, lymphadenopathy, anemia, and calf pain    Allergies  No Known Allergies    __________________________________________________  MEDS:  MEDICATIONS  (STANDING):  acetaminophen     Tablet .. 650 every 6 hours PRN  aspirin enteric coated 81 daily  atorvastatin 20 at bedtime  benzonatate 100 every 8 hours  enoxaparin Injectable 40 every 24 hours  guaiFENesin  every 12 hours  NIFEdipine XL 30 every 24 hours  senna 1 at bedtime  sildenafil (REVATIO) 10 two times a day  tacrolimus ER Tablet (ENVARSUS XR) 3 <User Schedule>    _________________________________________________  ANTIMICOBIALS  remdesivir  IVPB    tacrolimus ER Tablet (ENVARSUS XR) 3 <User Schedule>      GENERAL LABS              15.2                 136  | 26   | 18           12.50 >-----------< 237     ------------------------< 142                   46.5                 4.1  | 97   | 1.30                                         Ca 10.1  Mg x     Ph x          Urinalysis Basic - ( 27 Dec 2023 17:42 )    Color: x / Appearance: x / SG: x / pH: x  Gluc: 142 mg/dL / Ketone: x  / Bili: x / Urobili: x   Blood: x / Protein: x / Nitrite: x   Leuk Esterase: x / RBC: x / WBC x   Sq Epi: x / Non Sq Epi: x / Bacteria: x        _________________________________________________  MICROBIOLOGY  -----------          Rapid RVP Result: Detected (12-27 @ 17:56)  CMVPCR Log: NotDetec [id number] (12-27 @ 17:42)          Fungitell:   _______________________________________________  PERTINENT IMAGING

## 2023-12-28 NOTE — H&P ADULT - PROBLEM SELECTOR PLAN 1
Fills SIRS criteria with tachycardia and leukocytosis. HDS and with good saturations.   > infectious workup: BCx (pending), UA (pending), UCx (pending), RVP (+COVID), MRSA (pending), sputum Cx (pending)  > imaging: CXR (clear)   - procal negative, low suspicion for bacterial infection   - c/w Remdesivir   - no indications for abx currently  - tessalon pearls, airway clearance device

## 2023-12-28 NOTE — CONSULT NOTE ADULT - ATTENDING COMMENTS
Briefly, 69 y/o M w/ h/o prior stage D NICM (likely familial) with prior rejection (last 6/23) who presented with fatigue, cough, fevers since Sunday (12/24) found to be Covid positive. Of note, hadn't received Covid vaccine aside from initial series. Currently reports nasal congestion. On exam, JVP low, RRR, no m/r/g, CTAB, nontender abdomen, no pedal edema. Labs reviewed.   - hold Cellcept  - c/w Envarsus; check level 30-60 min prior to dose  - c/w Remdesivir  - c/w other meds

## 2023-12-28 NOTE — CONSULT NOTE ADULT - PROBLEM SELECTOR RECOMMENDATION 9
In the setting of COVID infection, currently on small amount of oxygen, slightly tachycardic and having fevers  -Continue remdesevir per ID transplant team  -Continue with supportive treatment  -Rejection meds as outlined below  -No need for antibiotics at this time

## 2023-12-28 NOTE — ED ADULT NURSE REASSESSMENT NOTE - NS ED NURSE REASSESS COMMENT FT1
RN attempting to give patient morning medications. Patient states he takes tacrolimus at 8am and the rest of his medications at 9am. Patient states medications must be taken with food, and the tacrolimus must be taken exactly one hour prior to the rest of his medications. RN rescheduled medications to match patient home regimen.

## 2023-12-28 NOTE — PATIENT PROFILE ADULT - FALL HARM RISK - UNIVERSAL INTERVENTIONS
Bed in lowest position, wheels locked, appropriate side rails in place/Call bell, personal items and telephone in reach/Instruct patient to call for assistance before getting out of bed or chair/Non-slip footwear when patient is out of bed/[place] to call system/Physically safe environment - no spills, clutter or unnecessary equipment/Purposeful Proactive Rounding/Room/bathroom lighting operational, light cord in reach Bed in lowest position, wheels locked, appropriate side rails in place/Call bell, personal items and telephone in reach/Instruct patient to call for assistance before getting out of bed or chair/Non-slip footwear when patient is out of bed/[place] to call system/Physically safe environment - no spills, clutter or unnecessary equipment/Purposeful Proactive Rounding/Room/bathroom lighting operational, light cord in reach

## 2023-12-28 NOTE — H&P ADULT - PROBLEM SELECTOR PLAN 4
Fluids: none   Diet: DASH diet   DVT ppx:     GoC: full code  PT: pending consult   Dispo: pending clinical evaluation

## 2023-12-28 NOTE — H&P ADULT - HISTORY OF PRESENT ILLNESS
Bilobed Flap Text: The defect edges were debeveled with a #15 scalpel blade.  Given the location of the defect and the proximity to free margins a bilobe flap was deemed most appropriate.  Using a sterile surgical marker, an appropriate bilobe flap drawn around the defect.    The area thus outlined was incised deep to adipose tissue with a #15 scalpel blade.  The skin margins were undermined to an appropriate distance in all directions utilizing iris scissors. Complex Repair And Double M Plasty Text: The defect edges were debeveled with a #15 scalpel blade.  The primary defect was closed partially with a complex linear closure.  Given the location of the remaining defect, shape of the defect and the proximity to free margins a double M plasty was deemed most appropriate for complete closure of the defect.  Using a sterile surgical marker, an appropriate advancement flap was drawn incorporating the defect and placing the expected incisions within the relaxed skin tension lines where possible.    The area thus outlined was incised deep to adipose tissue with a #15 scalpel blade.  The skin margins were undermined to an appropriate distance in all directions utilizing iris scissors. Cheek-To-Nose Interpolation Flap Text: A decision was made to reconstruct the defect utilizing an interpolation axial flap and a staged reconstruction.  A telfa template was made of the defect.  This telfa template was then used to outline the Cheek-To-Nose Interpolation flap.  The donor area for the pedicle flap was then injected with anesthesia.  The flap was excised through the skin and subcutaneous tissue down to the layer of the underlying musculature.  The interpolation flap was carefully excised within this deep plane to maintain its blood supply.  The edges of the donor site were undermined.   The donor site was closed in a primary fashion.  The pedicle was then rotated into position and sutured.  Once the tube was sutured into place, adequate blood supply was confirmed with blanching and refill.  The pedicle was then wrapped with xeroform gauze and dressed appropriately with a telfa and gauze bandage to ensure continued blood supply and protect the attached pedicle. M-Plasty Intermediate Repair Preamble Text (Leave Blank If You Do Not Want): Undermining was performed with blunt dissection. Secondary Defect Length (In Cm): 0 V-Y Flap Text: The defect edges were debeveled with a #15 scalpel blade.  Given the location of the defect, shape of the defect and the proximity to free margins a V-Y flap was deemed most appropriate.  Using a sterile surgical marker, an appropriate advancement flap was drawn incorporating the defect and placing the expected incisions within the relaxed skin tension lines where possible.    The area thus outlined was incised deep to adipose tissue with a #15 scalpel blade.  The skin margins were undermined to an appropriate distance in all directions utilizing iris scissors. Show Pathology Comment Variable: Yes O-L Flap Text: The defect edges were debeveled with a #15 scalpel blade.  Given the location of the defect, shape of the defect and the proximity to free margins an O-L flap was deemed most appropriate.  Using a sterile surgical marker, an appropriate advancement flap was drawn incorporating the defect and placing the expected incisions within the relaxed skin tension lines where possible.    The area thus outlined was incised deep to adipose tissue with a #15 scalpel blade.  The skin margins were undermined to an appropriate distance in all directions utilizing iris scissors. Partial Purse String (Simple) Text: Given the location of the defect and the characteristics of the surrounding skin a simple purse string closure was deemed most appropriate.  Undermining was performed circumferentially around the surgical defect.  A purse string suture was then placed and tightened. Wound tension of the circular defect prevented complete closure of the wound. Interpolation Flap Text: A decision was made to reconstruct the defect utilizing an interpolation axial flap and a staged reconstruction.  A telfa template was made of the defect.  This telfa template was then used to outline the interpolation flap.  The donor area for the pedicle flap was then injected with anesthesia.  The flap was excised through the skin and subcutaneous tissue down to the layer of the underlying musculature.  The interpolation flap was carefully excised within this deep plane to maintain its blood supply.  The edges of the donor site were undermined.   The donor site was closed in a primary fashion.  The pedicle was then rotated into position and sutured.  Once the tube was sutured into place, adequate blood supply was confirmed with blanching and refill.  The pedicle was then wrapped with xeroform gauze and dressed appropriately with a telfa and gauze bandage to ensure continued blood supply and protect the attached pedicle. Advancement-Rotation Flap Text: The defect edges were debeveled with a #15 scalpel blade.  Given the location of the defect, shape of the defect and the proximity to free margins an advancement-rotation flap was deemed most appropriate.  Using a sterile surgical marker, an appropriate flap was drawn incorporating the defect and placing the expected incisions within the relaxed skin tension lines where possible. The area thus outlined was incised deep to adipose tissue with a #15 scalpel blade.  The skin margins were undermined to an appropriate distance in all directions utilizing iris scissors. Undermining Location (Optional): in the superficial subcutaneous fat Graft Donor Site Will Heal By Secondary Intention: No Complex Repair And Single Advancement Flap Text: The defect edges were debeveled with a #15 scalpel blade.  The primary defect was closed partially with a complex linear closure.  Given the location of the remaining defect, shape of the defect and the proximity to free margins a single advancement flap was deemed most appropriate for complete closure of the defect.  Using a sterile surgical marker, an appropriate advancement flap was drawn incorporating the defect and placing the expected incisions within the relaxed skin tension lines where possible.    The area thus outlined was incised deep to adipose tissue with a #15 scalpel blade.  The skin margins were undermined to an appropriate distance in all directions utilizing iris scissors. Bilobed Transposition Flap Text: The defect edges were debeveled with a #15 scalpel blade.  Given the location of the defect and the proximity to free margins a bilobed transposition flap was deemed most appropriate.  Using a sterile surgical marker, an appropriate bilobe flap drawn around the defect.    The area thus outlined was incised deep to adipose tissue with a #15 scalpel blade.  The skin margins were undermined to an appropriate distance in all directions utilizing iris scissors. Complex Repair And W Plasty Text: The defect edges were debeveled with a #15 scalpel blade.  The primary defect was closed partially with a complex linear closure.  Given the location of the remaining defect, shape of the defect and the proximity to free margins a W plasty was deemed most appropriate for complete closure of the defect.  Using a sterile surgical marker, an appropriate advancement flap was drawn incorporating the defect and placing the expected incisions within the relaxed skin tension lines where possible.    The area thus outlined was incised deep to adipose tissue with a #15 scalpel blade.  The skin margins were undermined to an appropriate distance in all directions utilizing iris scissors. Wound Care: Petrolatum Size Of Lesion In Cm: 0.8 Information: Selecting Yes will display possible errors in your note based on the variables you have selected. This validation is only offered as a suggestion for you. PLEASE NOTE THAT THE VALIDATION TEXT WILL BE REMOVED WHEN YOU FINALIZE YOUR NOTE. IF YOU WANT TO FAX A PRELIMINARY NOTE YOU WILL NEED TO TOGGLE THIS TO 'NO' IF YOU DO NOT WANT IT IN YOUR FAXED NOTE. Complex Repair And Modified Advancement Flap Text: The defect edges were debeveled with a #15 scalpel blade.  The primary defect was closed partially with a complex linear closure.  Given the location of the remaining defect, shape of the defect and the proximity to free margins a modified advancement flap was deemed most appropriate for complete closure of the defect.  Using a sterile surgical marker, an appropriate advancement flap was drawn incorporating the defect and placing the expected incisions within the relaxed skin tension lines where possible.    The area thus outlined was incised deep to adipose tissue with a #15 scalpel blade.  The skin margins were undermined to an appropriate distance in all directions utilizing iris scissors. Complex Repair And Double Advancement Flap Text: The defect edges were debeveled with a #15 scalpel blade.  The primary defect was closed partially with a complex linear closure.  Given the location of the remaining defect, shape of the defect and the proximity to free margins a double advancement flap was deemed most appropriate for complete closure of the defect.  Using a sterile surgical marker, an appropriate advancement flap was drawn incorporating the defect and placing the expected incisions within the relaxed skin tension lines where possible.    The area thus outlined was incised deep to adipose tissue with a #15 scalpel blade.  The skin margins were undermined to an appropriate distance in all directions utilizing iris scissors. Trilobed Flap Text: The defect edges were debeveled with a #15 scalpel blade.  Given the location of the defect and the proximity to free margins a trilobed flap was deemed most appropriate.  Using a sterile surgical marker, an appropriate trilobed flap drawn around the defect.    The area thus outlined was incised deep to adipose tissue with a #15 scalpel blade.  The skin margins were undermined to an appropriate distance in all directions utilizing iris scissors. Complex Repair And Z Plasty Text: The defect edges were debeveled with a #15 scalpel blade.  The primary defect was closed partially with a complex linear closure.  Given the location of the remaining defect, shape of the defect and the proximity to free margins a Z plasty was deemed most appropriate for complete closure of the defect.  Using a sterile surgical marker, an appropriate advancement flap was drawn incorporating the defect and placing the expected incisions within the relaxed skin tension lines where possible.    The area thus outlined was incised deep to adipose tissue with a #15 scalpel blade.  The skin margins were undermined to an appropriate distance in all directions utilizing iris scissors. Melolabial Interpolation Flap Text: A decision was made to reconstruct the defect utilizing an interpolation axial flap and a staged reconstruction.  A telfa template was made of the defect.  This telfa template was then used to outline the melolabial interpolation flap.  The donor area for the pedicle flap was then injected with anesthesia.  The flap was excised through the skin and subcutaneous tissue down to the layer of the underlying musculature.  The pedicle flap was carefully excised within this deep plane to maintain its blood supply.  The edges of the donor site were undermined.   The donor site was closed in a primary fashion.  The pedicle was then rotated into position and sutured.  Once the tube was sutured into place, adequate blood supply was confirmed with blanching and refill.  The pedicle was then wrapped with xeroform gauze and dressed appropriately with a telfa and gauze bandage to ensure continued blood supply and protect the attached pedicle. Curvilinear Excision Additional Text (Leave Blank If You Do Not Want): The margin was drawn around the clinically apparent lesion.  A curvilinear shape was then drawn on the skin incorporating the lesion and margins.  Incisions were then made along these lines to the appropriate tissue plane and the lesion was extirpated. Mercedes Flap Text: The defect edges were debeveled with a #15 scalpel blade.  Given the location of the defect, shape of the defect and the proximity to free margins a Mercedes flap was deemed most appropriate.  Using a sterile surgical marker, an appropriate advancement flap was drawn incorporating the defect and placing the expected incisions within the relaxed skin tension lines where possible. The area thus outlined was incised deep to adipose tissue with a #15 scalpel blade.  The skin margins were undermined to an appropriate distance in all directions utilizing iris scissors. Complex Repair And A-T Advancement Flap Text: The defect edges were debeveled with a #15 scalpel blade.  The primary defect was closed partially with a complex linear closure.  Given the location of the remaining defect, shape of the defect and the proximity to free margins an A-T advancement flap was deemed most appropriate for complete closure of the defect.  Using a sterile surgical marker, an appropriate advancement flap was drawn incorporating the defect and placing the expected incisions within the relaxed skin tension lines where possible.    The area thus outlined was incised deep to adipose tissue with a #15 scalpel blade.  The skin margins were undermined to an appropriate distance in all directions utilizing iris scissors. 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    States that his symptoms started on Sunday with slow onset of fever, chills, and a congested nose. He denies travel history but endorses sick contacts with his daughter also testing positive for COVID. He denies headaches, chest pain, palpitations, n/v/c/d. He does have sputum production intermittently with a cough. Otherwise no other complaints.     In ED, vitals with fever to 101.3, tachycardia. Labs significant for leukocytosis, elevated alkp, lactate 2.2 on VBG, RVP+COVID. Received Remdesivir and 250cc fluid bolus.  Suture Removal: 14 days Complex Repair And Ftsg Text: The defect edges were debeveled with a #15 scalpel blade.  The primary defect was closed partially with a complex linear closure.  Given the location of the defect, shape of the defect and the proximity to free margins a full thickness skin graft was deemed most appropriate to repair the remaining defect.  The graft was trimmed to fit the size of the remaining defect.  The graft was then placed in the primary defect, oriented appropriately, and sutured into place. Island Pedicle Flap Text: The defect edges were debeveled with a #15 scalpel blade.  Given the location of the defect, shape of the defect and the proximity to free margins an island pedicle advancement flap was deemed most appropriate.  Using a sterile surgical marker, an appropriate advancement flap was drawn incorporating the defect, outlining the appropriate donor tissue and placing the expected incisions within the relaxed skin tension lines where possible.    The area thus outlined was incised deep to adipose tissue with a #15 scalpel blade.  The skin margins were undermined to an appropriate distance in all directions around the primary defect and laterally outward around the island pedicle utilizing iris scissors.  There was minimal undermining beneath the pedicle flap. Complex Repair And Dorsal Nasal Flap Text: The defect edges were debeveled with a #15 scalpel blade.  The primary defect was closed partially with a complex linear closure.  Given the location of the remaining defect, shape of the defect and the proximity to free margins a dorsal nasal flap was deemed most appropriate for complete closure of the defect.  Using a sterile surgical marker, an appropriate flap was drawn incorporating the defect and placing the expected incisions within the relaxed skin tension lines where possible.    The area thus outlined was incised deep to adipose tissue with a #15 scalpel blade.  The skin margins were undermined to an appropriate distance in all directions utilizing iris scissors. Dorsal Nasal Flap Text: The defect edges were debeveled with a #15 scalpel blade.  Given the location of the defect and the proximity to free margins a dorsal nasal flap was deemed most appropriate.  Using a sterile surgical marker, an appropriate dorsal nasal flap was drawn around the defect.    The area thus outlined was incised deep to adipose tissue with a #15 scalpel blade.  The skin margins were undermined to an appropriate distance in all directions utilizing iris scissors. Fusiform Excision Additional Text (Leave Blank If You Do Not Want): The margin was drawn around the clinically apparent lesion.  A fusiform shape was then drawn on the skin incorporating the lesion and margins.  Incisions were then made along these lines to the appropriate tissue plane and the lesion was extirpated. Mastoid Interpolation Flap Text: A decision was made to reconstruct the defect utilizing an interpolation axial flap and a staged reconstruction.  A telfa template was made of the defect.  This telfa template was then used to outline the mastoid interpolation flap.  The donor area for the pedicle flap was then injected with anesthesia.  The flap was excised through the skin and subcutaneous tissue down to the layer of the underlying musculature.  The pedicle flap was carefully excised within this deep plane to maintain its blood supply.  The edges of the donor site were undermined.   The donor site was closed in a primary fashion.  The pedicle was then rotated into position and sutured.  Once the tube was sutured into place, adequate blood supply was confirmed with blanching and refill.  The pedicle was then wrapped with xeroform gauze and dressed appropriately with a telfa and gauze bandage to ensure continued blood supply and protect the attached pedicle. Deep Sutures: 4-0 Maxon Anesthesia Type: 1% lidocaine with epinephrine Modified Advancement Flap Text: The defect edges were debeveled with a #15 scalpel blade.  Given the location of the defect, shape of the defect and the proximity to free margins a modified advancement flap was deemed most appropriate.  Using a sterile surgical marker, an appropriate advancement flap was drawn incorporating the defect and placing the expected incisions within the relaxed skin tension lines where possible.    The area thus outlined was incised deep to adipose tissue with a #15 scalpel blade.  The skin margins were undermined to an appropriate distance in all directions utilizing iris scissors. Epidermal Closure Graft Donor Site (Optional): simple interrupted Complex Repair And O-T Advancement Flap Text: The defect edges were debeveled with a #15 scalpel blade.  The primary defect was closed partially with a complex linear closure.  Given the location of the remaining defect, shape of the defect and the proximity to free margins an O-T advancement flap was deemed most appropriate for complete closure of the defect.  Using a sterile surgical marker, an appropriate advancement flap was drawn incorporating the defect and placing the expected incisions within the relaxed skin tension lines where possible.    The area thus outlined was incised deep to adipose tissue with a #15 scalpel blade.  The skin margins were undermined to an appropriate distance in all directions utilizing iris scissors. Island Pedicle Flap With Canthal Suspension Text: The defect edges were debeveled with a #15 scalpel blade.  Given the location of the defect, shape of the defect and the proximity to free margins an island pedicle advancement flap was deemed most appropriate.  Using a sterile surgical marker, an appropriate advancement flap was drawn incorporating the defect, outlining the appropriate donor tissue and placing the expected incisions within the relaxed skin tension lines where possible. The area thus outlined was incised deep to adipose tissue with a #15 scalpel blade.  The skin margins were undermined to an appropriate distance in all directions around the primary defect and laterally outward around the island pedicle utilizing iris scissors.  There was minimal undermining beneath the pedicle flap. A suspension suture was placed in the canthal tendon to prevent tension and prevent ectropion. Complex Repair And Split-Thickness Skin Graft Text: The defect edges were debeveled with a #15 scalpel blade.  The primary defect was closed partially with a complex linear closure.  Given the location of the defect, shape of the defect and the proximity to free margins a split thickness skin graft was deemed most appropriate to repair the remaining defect.  The graft was trimmed to fit the size of the remaining defect.  The graft was then placed in the primary defect, oriented appropriately, and sutured into place. Size Of Margin In Cm: 0.4 Paramedian Forehead Flap Text: A decision was made to reconstruct the defect utilizing an interpolation axial flap and a staged reconstruction.  A telfa template was made of the defect.  This telfa template was then used to outline the paramedian forehead pedicle flap.  The donor area for the pedicle flap was then injected with anesthesia.  The flap was excised through the skin and subcutaneous tissue down to the layer of the underlying musculature.  The pedicle flap was carefully excised within this deep plane to maintain its blood supply.  The edges of the donor site were undermined.   The donor site was closed in a primary fashion.  The pedicle was then rotated into position and sutured.  Once the tube was sutured into place, adequate blood supply was confirmed with blanching and refill.  The pedicle was then wrapped with xeroform gauze and dressed appropriately with a telfa and gauze bandage to ensure continued blood supply and protect the attached pedicle. Saucerization Excision Additional Text (Leave Blank If You Do Not Want): The margin was drawn around the clinically apparent lesion.  Incisions were then made along these lines, in a tangential fashion, to the appropriate tissue plane and the lesion was extirpated. Hatchet Flap Text: The defect edges were debeveled with a #15 scalpel blade.  Given the location of the defect, shape of the defect and the proximity to free margins a hatchet flap was deemed most appropriate.  Using a sterile surgical marker, an appropriate hatchet flap was drawn incorporating the defect and placing the expected incisions within the relaxed skin tension lines where possible.    The area thus outlined was incised deep to adipose tissue with a #15 scalpel blade.  The skin margins were undermined to an appropriate distance in all directions utilizing iris scissors. Elliptical Excision Additional Text (Leave Blank If You Do Not Want): The margin was drawn around the clinically apparent lesion.  An elliptical shape was then drawn on the skin incorporating the lesion and margins.  Incisions were then made along these lines to the appropriate tissue plane and the lesion was extirpated. Posterior Auricular Interpolation Flap Text: A decision was made to reconstruct the defect utilizing an interpolation axial flap and a staged reconstruction.  A telfa template was made of the defect.  This telfa template was then used to outline the posterior auricular interpolation flap.  The donor area for the pedicle flap was then injected with anesthesia.  The flap was excised through the skin and subcutaneous tissue down to the layer of the underlying musculature.  The pedicle flap was carefully excised within this deep plane to maintain its blood supply.  The edges of the donor site were undermined.   The donor site was closed in a primary fashion.  The pedicle was then rotated into position and sutured.  Once the tube was sutured into place, adequate blood supply was confirmed with blanching and refill.  The pedicle was then wrapped with xeroform gauze and dressed appropriately with a telfa and gauze bandage to ensure continued blood supply and protect the attached pedicle. Mucosal Advancement Flap Text: Given the location of the defect, shape of the defect and the proximity to free margins a mucosal advancement flap was deemed most appropriate. Incisions were made with a 15 blade scalpel in the appropriate fashion along the cutaneous vermilion border and the mucosal lip. The remaining actinically damaged mucosal tissue was excised.  The mucosal advancement flap was then elevated to the gingival sulcus with care taken to preserve the neurovascular structures and advanced into the primary defect. Care was taken to ensure that precise realignment of the vermilion border was achieved. Dressing: pressure dressing with telfa Alar Island Pedicle Flap Text: The defect edges were debeveled with a #15 scalpel blade.  Given the location of the defect, shape of the defect and the proximity to the alar rim an island pedicle advancement flap was deemed most appropriate.  Using a sterile surgical marker, an appropriate advancement flap was drawn incorporating the defect, outlining the appropriate donor tissue and placing the expected incisions within the nasal ala running parallel to the alar rim. The area thus outlined was incised with a #15 scalpel blade.  The skin margins were undermined minimally to an appropriate distance in all directions around the primary defect and laterally outward around the island pedicle utilizing iris scissors.  There was minimal undermining beneath the pedicle flap. Complex Repair And Epidermal Autograft Text: The defect edges were debeveled with a #15 scalpel blade.  The primary defect was closed partially with a complex linear closure.  Given the location of the defect, shape of the defect and the proximity to free margins an epidermal autograft was deemed most appropriate to repair the remaining defect.  The graft was trimmed to fit the size of the remaining defect.  The graft was then placed in the primary defect, oriented appropriately, and sutured into place. Slit Excision Additional Text (Leave Blank If You Do Not Want): A linear line was drawn on the skin overlying the lesion. An incision was made slowly until the lesion was visualized.  Once visualized, the lesion was removed with blunt dissection. Lip Wedge Excision Repair Text: Given the location of the defect and the proximity to free margins a full thickness wedge repair was deemed most appropriate.  Using a sterile surgical marker, the appropriate repair was drawn incorporating the defect and placing the expected incisions perpendicular to the vermilion border.  The vermilion border was also meticulously outlined to ensure appropriate reapproximation during the repair.  The area thus outlined was incised through and through with a #15 scalpel blade.  The muscularis and dermis were reaproximated with deep sutures following hemostasis. Care was taken to realign the vermilion border before proceeding with the superficial closure.  Once the vermilion was realigned the superfical and mucosal closure was finished. Rotation Flap Text: The defect edges were debeveled with a #15 scalpel blade.  Given the location of the defect, shape of the defect and the proximity to free margins a rotation flap was deemed most appropriate.  Using a sterile surgical marker, an appropriate rotation flap was drawn incorporating the defect and placing the expected incisions within the relaxed skin tension lines where possible.    The area thus outlined was incised deep to adipose tissue with a #15 scalpel blade.  The skin margins were undermined to an appropriate distance in all directions utilizing iris scissors. Excision Method: Elliptical Complex Repair And O-L Flap Text: The defect edges were debeveled with a #15 scalpel blade.  The primary defect was closed partially with a complex linear closure.  Given the location of the remaining defect, shape of the defect and the proximity to free margins an O-L flap was deemed most appropriate for complete closure of the defect.  Using a sterile surgical marker, an appropriate flap was drawn incorporating the defect and placing the expected incisions within the relaxed skin tension lines where possible.    The area thus outlined was incised deep to adipose tissue with a #15 scalpel blade.  The skin margins were undermined to an appropriate distance in all directions utilizing iris scissors. Billing Type: Third-Party Bill Excisional Biopsy Additional Text (Leave Blank If You Do Not Want): The margin was drawn around the clinically apparent lesion. An elliptical shape was then drawn on the skin incorporating the lesion and margins.  Incisions were then made along these lines to the appropriate tissue plane and the lesion was extirpated. Spiral Flap Text: The defect edges were debeveled with a #15 scalpel blade.  Given the location of the defect, shape of the defect and the proximity to free margins a spiral flap was deemed most appropriate.  Using a sterile surgical marker, an appropriate rotation flap was drawn incorporating the defect and placing the expected incisions within the relaxed skin tension lines where possible. The area thus outlined was incised deep to adipose tissue with a #15 scalpel blade.  The skin margins were undermined to an appropriate distance in all directions utilizing iris scissors. Additional Anesthesia Volume In Cc: 10 Complex Repair And Bilobe Flap Text: The defect edges were debeveled with a #15 scalpel blade.  The primary defect was closed partially with a complex linear closure.  Given the location of the remaining defect, shape of the defect and the proximity to free margins a bilobe flap was deemed most appropriate for complete closure of the defect.  Using a sterile surgical marker, an appropriate advancement flap was drawn incorporating the defect and placing the expected incisions within the relaxed skin tension lines where possible.    The area thus outlined was incised deep to adipose tissue with a #15 scalpel blade.  The skin margins were undermined to an appropriate distance in all directions utilizing iris scissors. Complex Repair And Dermal Autograft Text: The defect edges were debeveled with a #15 scalpel blade.  The primary defect was closed partially with a complex linear closure.  Given the location of the defect, shape of the defect and the proximity to free margins an dermal autograft was deemed most appropriate to repair the remaining defect.  The graft was trimmed to fit the size of the remaining defect.  The graft was then placed in the primary defect, oriented appropriately, and sutured into place. Double Island Pedicle Flap Text: The defect edges were debeveled with a #15 scalpel blade.  Given the location of the defect, shape of the defect and the proximity to free margins a double island pedicle advancement flap was deemed most appropriate.  Using a sterile surgical marker, an appropriate advancement flap was drawn incorporating the defect, outlining the appropriate donor tissue and placing the expected incisions within the relaxed skin tension lines where possible.    The area thus outlined was incised deep to adipose tissue with a #15 scalpel blade.  The skin margins were undermined to an appropriate distance in all directions around the primary defect and laterally outward around the island pedicle utilizing iris scissors.  There was minimal undermining beneath the pedicle flap. Ftsg Text: The defect edges were debeveled with a #15 scalpel blade.  Given the location of the defect, shape of the defect and the proximity to free margins a full thickness skin graft was deemed most appropriate.  Using a sterile surgical marker, the primary defect shape was transferred to the donor site. The area thus outlined was incised deep to adipose tissue with a #15 scalpel blade.  The harvested graft was then trimmed of adipose tissue until only dermis and epidermis was left.  The skin margins of the secondary defect were undermined to an appropriate distance in all directions utilizing iris scissors.  The secondary defect was closed with interrupted buried subcutaneous sutures.  The skin edges were then re-apposed with running  sutures.  The skin graft was then placed in the primary defect and oriented appropriately. Repair Type: Complex Complex Repair And Melolabial Flap Text: The defect edges were debeveled with a #15 scalpel blade.  The primary defect was closed partially with a complex linear closure.  Given the location of the remaining defect, shape of the defect and the proximity to free margins a melolabial flap was deemed most appropriate for complete closure of the defect.  Using a sterile surgical marker, an appropriate advancement flap was drawn incorporating the defect and placing the expected incisions within the relaxed skin tension lines where possible.    The area thus outlined was incised deep to adipose tissue with a #15 scalpel blade.  The skin margins were undermined to an appropriate distance in all directions utilizing iris scissors. Island Pedicle Flap-Requiring Vessel Identification Text: The defect edges were debeveled with a #15 scalpel blade.  Given the location of the defect, shape of the defect and the proximity to free margins an island pedicle advancement flap was deemed most appropriate.  Using a sterile surgical marker, an appropriate advancement flap was drawn, based on the axial vessel mentioned above, incorporating the defect, outlining the appropriate donor tissue and placing the expected incisions within the relaxed skin tension lines where possible.    The area thus outlined was incised deep to adipose tissue with a #15 scalpel blade.  The skin margins were undermined to an appropriate distance in all directions around the primary defect and laterally outward around the island pedicle utilizing iris scissors.  There was minimal undermining beneath the pedicle flap. Complex Repair And Tissue Cultured Epidermal Autograft Text: The defect edges were debeveled with a #15 scalpel blade.  The primary defect was closed partially with a complex linear closure.  Given the location of the defect, shape of the defect and the proximity to free margins an tissue cultured epidermal autograft was deemed most appropriate to repair the remaining defect.  The graft was trimmed to fit the size of the remaining defect.  The graft was then placed in the primary defect, oriented appropriately, and sutured into place. Split-Thickness Skin Graft Text: The defect edges were debeveled with a #15 scalpel blade.  Given the location of the defect, shape of the defect and the proximity to free margins a split thickness skin graft was deemed most appropriate.  Using a sterile surgical marker, the primary defect shape was transferred to the donor site. The split thickness graft was then harvested.  The skin graft was then placed in the primary defect and oriented appropriately. Perilesional Excision Additional Text (Leave Blank If You Do Not Want): The margin was drawn around the clinically apparent lesion. Incisions were then made along these lines to the appropriate tissue plane and the lesion was extirpated. Star Wedge Flap Text: The defect edges were debeveled with a #15 scalpel blade.  Given the location of the defect, shape of the defect and the proximity to free margins a star wedge flap was deemed most appropriate.  Using a sterile surgical marker, an appropriate rotation flap was drawn incorporating the defect and placing the expected incisions within the relaxed skin tension lines where possible. The area thus outlined was incised deep to adipose tissue with a #15 scalpel blade.  The skin margins were undermined to an appropriate distance in all directions utilizing iris scissors. O-T Plasty Text: The defect edges were debeveled with a #15 scalpel blade.  Given the location of the defect, shape of the defect and the proximity to free margins an O-T plasty was deemed most appropriate.  Using a sterile surgical marker, an appropriate O-T plasty was drawn incorporating the defect and placing the expected incisions within the relaxed skin tension lines where possible.    The area thus outlined was incised deep to adipose tissue with a #15 scalpel blade.  The skin margins were undermined to an appropriate distance in all directions utilizing iris scissors. Complex Repair And Skin Substitute Graft Text: The defect edges were debeveled with a #15 scalpel blade.  The primary defect was closed partially with a complex linear closure.  Given the location of the remaining defect, shape of the defect and the proximity to free margins a skin substitute graft was deemed most appropriate to repair the remaining defect.  The graft was trimmed to fit the size of the remaining defect.  The graft was then placed in the primary defect, oriented appropriately, and sutured into place. Composite Graft Text: The defect edges were debeveled with a #15 scalpel blade.  Given the location of the defect, shape of the defect, the proximity to free margins and the fact the defect was full thickness a composite graft was deemed most appropriate.  The defect was outline and then transferred to the donor site.  A full thickness graft was then excised from the donor site. The graft was then placed in the primary defect, oriented appropriately and then sutured into place.  The secondary defect was then repaired using a primary closure. Repair Performed By Another Provider Text (Leave Blank If You Do Not Want): After the tissue was excised the defect was repaired by another provider. Keystone Flap Text: The defect edges were debeveled with a #15 scalpel blade.  Given the location of the defect, shape of the defect a keystone flap was deemed most appropriate.  Using a sterile surgical marker, an appropriate keystone flap was drawn incorporating the defect, outlining the appropriate donor tissue and placing the expected incisions within the relaxed skin tension lines where possible. The area thus outlined was incised deep to adipose tissue with a #15 scalpel blade.  The skin margins were undermined to an appropriate distance in all directions around the primary defect and laterally outward around the flap utilizing iris scissors. Complex Repair And Xenograft Text: The defect edges were debeveled with a #15 scalpel blade.  The primary defect was closed partially with a complex linear closure.  Given the location of the defect, shape of the defect and the proximity to free margins a xenograft was deemed most appropriate to repair the remaining defect.  The graft was trimmed to fit the size of the remaining defect.  The graft was then placed in the primary defect, oriented appropriately, and sutured into place. Cartilage Graft Text: The defect edges were debeveled with a #15 scalpel blade.  Given the location of the defect, shape of the defect, the fact the defect involved a full thickness cartilage defect a cartilage graft was deemed most appropriate.  An appropriate donor site was identified, cleansed, and anesthetized. The cartilage graft was then harvested and transferred to the recipient site, oriented appropriately and then sutured into place.  The secondary defect was then repaired using a primary closure. Transposition Flap Text: The defect edges were debeveled with a #15 scalpel blade.  Given the location of the defect and the proximity to free margins a transposition flap was deemed most appropriate.  Using a sterile surgical marker, an appropriate transposition flap was drawn incorporating the defect.    The area thus outlined was incised deep to adipose tissue with a #15 scalpel blade.  The skin margins were undermined to an appropriate distance in all directions utilizing iris scissors. Complex Repair And Rotation Flap Text: The defect edges were debeveled with a #15 scalpel blade.  The primary defect was closed partially with a complex linear closure.  Given the location of the remaining defect, shape of the defect and the proximity to free margins a rotation flap was deemed most appropriate for complete closure of the defect.  Using a sterile surgical marker, an appropriate advancement flap was drawn incorporating the defect and placing the expected incisions within the relaxed skin tension lines where possible.    The area thus outlined was incised deep to adipose tissue with a #15 scalpel blade.  The skin margins were undermined to an appropriate distance in all directions utilizing iris scissors. Positioning (Leave Blank If You Do Not Want): The patient was placed in a comfortable position exposing the surgical site. Intermediate / Complex Repair - Final Wound Length In Cm: 4.1 O-Z Plasty Text: The defect edges were debeveled with a #15 scalpel blade.  Given the location of the defect, shape of the defect and the proximity to free margins an O-Z plasty (double transposition flap) was deemed most appropriate.  Using a sterile surgical marker, the appropriate transposition flaps were drawn incorporating the defect and placing the expected incisions within the relaxed skin tension lines where possible.    The area thus outlined was incised deep to adipose tissue with a #15 scalpel blade.  The skin margins were undermined to an appropriate distance in all directions utilizing iris scissors.  Hemostasis was achieved with electrocautery.  The flaps were then transposed into place, one clockwise and the other counterclockwise, and anchored with interrupted buried subcutaneous sutures. Epidermal Autograft Text: The defect edges were debeveled with a #15 scalpel blade.  Given the location of the defect, shape of the defect and the proximity to free margins an epidermal autograft was deemed most appropriate.  Using a sterile surgical marker, the primary defect shape was transferred to the donor site. The epidermal graft was then harvested.  The skin graft was then placed in the primary defect and oriented appropriately. No Repair - Repaired With Adjacent Surgical Defect Text (Leave Blank If You Do Not Want): After the excision the defect was repaired concurrently with another surgical defect which was in close approximation. Epidermal Sutures: 4-0 Nylon Melolabial Transposition Flap Text: The defect edges were debeveled with a #15 scalpel blade.  Given the location of the defect and the proximity to free margins a melolabial flap was deemed most appropriate.  Using a sterile surgical marker, an appropriate melolabial transposition flap was drawn incorporating the defect.    The area thus outlined was incised deep to adipose tissue with a #15 scalpel blade.  The skin margins were undermined to an appropriate distance in all directions utilizing iris scissors. Complex Repair And Rhombic Flap Text: The defect edges were debeveled with a #15 scalpel blade.  The primary defect was closed partially with a complex linear closure.  Given the location of the remaining defect, shape of the defect and the proximity to free margins a rhombic flap was deemed most appropriate for complete closure of the defect.  Using a sterile surgical marker, an appropriate advancement flap was drawn incorporating the defect and placing the expected incisions within the relaxed skin tension lines where possible.    The area thus outlined was incised deep to adipose tissue with a #15 scalpel blade.  The skin margins were undermined to an appropriate distance in all directions utilizing iris scissors. Muscle Hinge Flap Text: The defect edges were debeveled with a #15 scalpel blade.  Given the size, depth and location of the defect and the proximity to free margins a muscle hinge flap was deemed most appropriate.  Using a sterile surgical marker, an appropriate hinge flap was drawn incorporating the defect. The area thus outlined was incised with a #15 scalpel blade.  The skin margins were undermined to an appropriate distance in all directions utilizing iris scissors. Path Notes (To The Dermatopathologist): Please check margins. Pre-Excision Curettage Text (Leave Blank If You Do Not Want): Prior to drawing the surgical margin the visible lesion was removed with electrodesiccation and curettage to clearly define the lesion size. Hemostasis: Electrocautery Double O-Z Plasty Text: The defect edges were debeveled with a #15 scalpel blade.  Given the location of the defect, shape of the defect and the proximity to free margins a Double O-Z plasty (double transposition flap) was deemed most appropriate.  Using a sterile surgical marker, the appropriate transposition flaps were drawn incorporating the defect and placing the expected incisions within the relaxed skin tension lines where possible. The area thus outlined was incised deep to adipose tissue with a #15 scalpel blade.  The skin margins were undermined to an appropriate distance in all directions utilizing iris scissors.  Hemostasis was achieved with electrocautery.  The flaps were then transposed into place, one clockwise and the other counterclockwise, and anchored with interrupted buried subcutaneous sutures. Advancement Flap (Single) Text: The defect edges were debeveled with a #15 scalpel blade.  Given the location of the defect and the proximity to free margins a single advancement flap was deemed most appropriate.  Using a sterile surgical marker, an appropriate advancement flap was drawn incorporating the defect and placing the expected incisions within the relaxed skin tension lines where possible.    The area thus outlined was incised deep to adipose tissue with a #15 scalpel blade.  The skin margins were undermined to an appropriate distance in all directions utilizing iris scissors. Dermal Autograft Text: The defect edges were debeveled with a #15 scalpel blade.  Given the location of the defect, shape of the defect and the proximity to free margins a dermal autograft was deemed most appropriate.  Using a sterile surgical marker, the primary defect shape was transferred to the donor site. The area thus outlined was incised deep to adipose tissue with a #15 scalpel blade.  The harvested graft was then trimmed of adipose and epidermal tissue until only dermis was left.  The skin graft was then placed in the primary defect and oriented appropriately. Rhombic Flap Text: The defect edges were debeveled with a #15 scalpel blade.  Given the location of the defect and the proximity to free margins a rhombic flap was deemed most appropriate.  Using a sterile surgical marker, an appropriate rhombic flap was drawn incorporating the defect.    The area thus outlined was incised deep to adipose tissue with a #15 scalpel blade.  The skin margins were undermined to an appropriate distance in all directions utilizing iris scissors. Estimated Blood Loss (Cc): minimal Lab: 253 Complex Repair Preamble Text (Leave Blank If You Do Not Want): Extensive wide undermining was performed. Rhomboid Transposition Flap Text: The defect edges were debeveled with a #15 scalpel blade.  Given the location of the defect and the proximity to free margins a rhomboid transposition flap was deemed most appropriate.  Using a sterile surgical marker, an appropriate rhomboid flap was drawn incorporating the defect.    The area thus outlined was incised deep to adipose tissue with a #15 scalpel blade.  The skin margins were undermined to an appropriate distance in all directions utilizing iris scissors. Excision Depth: adipose tissue Consent was obtained from the patient. The risks and benefits to therapy were discussed in detail. Specifically, the risks of infection, scarring, bleeding, prolonged wound healing, incomplete removal, allergy to anesthesia, nerve injury and recurrence were addressed. Prior to the procedure, the treatment site was clearly identified and confirmed by the patient. All components of Universal Protocol/PAUSE Rule completed. Graft Donor Site Bandage (Optional-Leave Blank If You Don't Want In Note): Steri-strips and a pressure bandage were applied to the donor site. Surgeon Performing The Repair (Optional): Ravin Power MD Lab Facility:  S Plasty Text: Given the location and shape of the defect, and the orientation of relaxed skin tension lines, an S-plasty was deemed most appropriate for repair.  Using a sterile surgical marker, the appropriate outline of the S-plasty was drawn, incorporating the defect and placing the expected incisions within the relaxed skin tension lines where possible.  The area thus outlined was incised deep to adipose tissue with a #15 scalpel blade.  The skin margins were undermined to an appropriate distance in all directions utilizing iris scissors. The skin flaps were advanced over the defect.  The opposing margins were then approximated with interrupted buried subcutaneous sutures. Skin Substitute Text: The defect edges were debeveled with a #15 scalpel blade.  Given the location of the defect, shape of the defect and the proximity to free margins a skin substitute graft was deemed most appropriate.  The graft material was trimmed to fit the size of the defect. The graft was then placed in the primary defect and oriented appropriately. Advancement Flap (Double) Text: The defect edges were debeveled with a #15 scalpel blade.  Given the location of the defect and the proximity to free margins a double advancement flap was deemed most appropriate.  Using a sterile surgical marker, the appropriate advancement flaps were drawn incorporating the defect and placing the expected incisions within the relaxed skin tension lines where possible.    The area thus outlined was incised deep to adipose tissue with a #15 scalpel blade.  The skin margins were undermined to an appropriate distance in all directions utilizing iris scissors. V-Y Plasty Text: The defect edges were debeveled with a #15 scalpel blade.  Given the location of the defect, shape of the defect and the proximity to free margins an V-Y advancement flap was deemed most appropriate.  Using a sterile surgical marker, an appropriate advancement flap was drawn incorporating the defect and placing the expected incisions within the relaxed skin tension lines where possible.    The area thus outlined was incised deep to adipose tissue with a #15 scalpel blade.  The skin margins were undermined to an appropriate distance in all directions utilizing iris scissors. Burow's Advancement Flap Text: The defect edges were debeveled with a #15 scalpel blade.  Given the location of the defect and the proximity to free margins a Burow's advancement flap was deemed most appropriate.  Using a sterile surgical marker, the appropriate advancement flap was drawn incorporating the defect and placing the expected incisions within the relaxed skin tension lines where possible.    The area thus outlined was incised deep to adipose tissue with a #15 scalpel blade.  The skin margins were undermined to an appropriate distance in all directions utilizing iris scissors. Tissue Cultured Epidermal Autograft Text: The defect edges were debeveled with a #15 scalpel blade.  Given the location of the defect, shape of the defect and the proximity to free margins a tissue cultured epidermal autograft was deemed most appropriate.  The graft was then trimmed to fit the size of the defect.  The graft was then placed in the primary defect and oriented appropriately. Bi-Rhombic Flap Text: The defect edges were debeveled with a #15 scalpel blade.  Given the location of the defect and the proximity to free margins a bi-rhombic flap was deemed most appropriate.  Using a sterile surgical marker, an appropriate rhombic flap was drawn incorporating the defect. The area thus outlined was incised deep to adipose tissue with a #15 scalpel blade.  The skin margins were undermined to an appropriate distance in all directions utilizing iris scissors. Purse String (Intermediate) Text: Given the location of the defect and the characteristics of the surrounding skin a purse string intermediate closure was deemed most appropriate.  Undermining was performed circumfirentially around the surgical defect.  A purse string suture was then placed and tightened. A-T Advancement Flap Text: The defect edges were debeveled with a #15 scalpel blade.  Given the location of the defect, shape of the defect and the proximity to free margins an A-T advancement flap was deemed most appropriate.  Using a sterile surgical marker, an appropriate advancement flap was drawn incorporating the defect and placing the expected incisions within the relaxed skin tension lines where possible.    The area thus outlined was incised deep to adipose tissue with a #15 scalpel blade.  The skin margins were undermined to an appropriate distance in all directions utilizing iris scissors. Xenograft Text: The defect edges were debeveled with a #15 scalpel blade.  Given the location of the defect, shape of the defect and the proximity to free margins a xenograft was deemed most appropriate.  The graft was then trimmed to fit the size of the defect.  The graft was then placed in the primary defect and oriented appropriately. Crescentic Advancement Flap Text: The defect edges were debeveled with a #15 scalpel blade.  Given the location of the defect and the proximity to free margins a crescentic advancement flap was deemed most appropriate.  Using a sterile surgical marker, the appropriate advancement flap was drawn incorporating the defect and placing the expected incisions within the relaxed skin tension lines where possible.    The area thus outlined was incised deep to adipose tissue with a #15 scalpel blade.  The skin margins were undermined to an appropriate distance in all directions utilizing iris scissors. Helical Rim Advancement Flap Text: The defect edges were debeveled with a #15 blade scalpel.  Given the location of the defect and the proximity to free margins (helical rim) a double helical rim advancement flap was deemed most appropriate.  Using a sterile surgical marker, the appropriate advancement flaps were drawn incorporating the defect and placing the expected incisions between the helical rim and antihelix where possible.  The area thus outlined was incised through and through with a #15 scalpel blade.  With a skin hook and iris scissors, the flaps were gently and sharply undermined and freed up. Scalpel Size: 15C blade H Plasty Text: Given the location of the defect, shape of the defect and the proximity to free margins a H-plasty was deemed most appropriate for repair.  Using a sterile surgical marker, the appropriate advancement arms of the H-plasty were drawn incorporating the defect and placing the expected incisions within the relaxed skin tension lines where possible. The area thus outlined was incised deep to adipose tissue with a #15 scalpel blade. The skin margins were undermined to an appropriate distance in all directions utilizing iris scissors.  The opposing advancement arms were then advanced into place in opposite direction and anchored with interrupted buried subcutaneous sutures. O-T Advancement Flap Text: The defect edges were debeveled with a #15 scalpel blade.  Given the location of the defect, shape of the defect and the proximity to free margins an O-T advancement flap was deemed most appropriate.  Using a sterile surgical marker, an appropriate advancement flap was drawn incorporating the defect and placing the expected incisions within the relaxed skin tension lines where possible.    The area thus outlined was incised deep to adipose tissue with a #15 scalpel blade.  The skin margins were undermined to an appropriate distance in all directions utilizing iris scissors. Purse String (Simple) Text: Given the location of the defect and the characteristics of the surrounding skin a purse string simple closure was deemed most appropriate.  Undermining was performed circumferentially around the surgical defect.  A purse string suture was then placed and tightened. Post-Care Instructions: I reviewed with the patient in detail post-care instructions. Patient is not to engage in any heavy lifting, exercise, or swimming for the next 14 days. Should the patient develop any fevers, chills, bleeding, severe pain patient will contact the office immediately. Epidermal Closure: horizontal mattress and simple interrupted Ear Star Wedge Flap Text: The defect edges were debeveled with a #15 blade scalpel.  Given the location of the defect and the proximity to free margins (helical rim) an ear star wedge flap was deemed most appropriate.  Using a sterile surgical marker, the appropriate flap was drawn incorporating the defect and placing the expected incisions between the helical rim and antihelix where possible.  The area thus outlined was incised through and through with a #15 scalpel blade. Complex Repair And V-Y Plasty Text: The defect edges were debeveled with a #15 scalpel blade.  The primary defect was closed partially with a complex linear closure.  Given the location of the remaining defect, shape of the defect and the proximity to free margins a V-Y plasty was deemed most appropriate for complete closure of the defect.  Using a sterile surgical marker, an appropriate advancement flap was drawn incorporating the defect and placing the expected incisions within the relaxed skin tension lines where possible.    The area thus outlined was incised deep to adipose tissue with a #15 scalpel blade.  The skin margins were undermined to an appropriate distance in all directions utilizing iris scissors. Bilateral Helical Rim Advancement Flap Text: The defect edges were debeveled with a #15 blade scalpel.  Given the location of the defect and the proximity to free margins (helical rim) a bilateral helical rim advancement flap was deemed most appropriate.  Using a sterile surgical marker, the appropriate advancement flaps were drawn incorporating the defect and placing the expected incisions between the helical rim and antihelix where possible.  The area thus outlined was incised through and through with a #15 scalpel blade.  With a skin hook and iris scissors, the flaps were gently and sharply undermined and freed up. Detail Level: Detailed Complex Repair And Transposition Flap Text: The defect edges were debeveled with a #15 scalpel blade.  The primary defect was closed partially with a complex linear closure.  Given the location of the remaining defect, shape of the defect and the proximity to free margins a transposition flap was deemed most appropriate for complete closure of the defect.  Using a sterile surgical marker, an appropriate advancement flap was drawn incorporating the defect and placing the expected incisions within the relaxed skin tension lines where possible.    The area thus outlined was incised deep to adipose tissue with a #15 scalpel blade.  The skin margins were undermined to an appropriate distance in all directions utilizing iris scissors. W Plasty Text: The lesion was extirpated to the level of the fat with a #15 scalpel blade.  Given the location of the defect, shape of the defect and the proximity to free margins a W-plasty was deemed most appropriate for repair.  Using a sterile surgical marker, the appropriate transposition arms of the W-plasty were drawn incorporating the defect and placing the expected incisions within the relaxed skin tension lines where possible.    The area thus outlined was incised deep to adipose tissue with a #15 scalpel blade.  The skin margins were undermined to an appropriate distance in all directions utilizing iris scissors.  The opposing transposition arms were then transposed into place in opposite direction and anchored with interrupted buried subcutaneous sutures. Partial Purse String (Intermediate) Text: Given the location of the defect and the characteristics of the surrounding skin an intermediate purse string closure was deemed most appropriate.  Undermining was performed circumferentially around the surgical defect.  A purse string suture was then placed and tightened. Wound tension of the circular defect prevented complete closure of the wound. Complex Repair And M Plasty Text: The defect edges were debeveled with a #15 scalpel blade.  The primary defect was closed partially with a complex linear closure.  Given the location of the remaining defect, shape of the defect and the proximity to free margins an M plasty was deemed most appropriate for complete closure of the defect.  Using a sterile surgical marker, an appropriate advancement flap was drawn incorporating the defect and placing the expected incisions within the relaxed skin tension lines where possible.    The area thus outlined was incised deep to adipose tissue with a #15 scalpel blade.  The skin margins were undermined to an appropriate distance in all directions utilizing iris scissors. Banner Transposition Flap Text: The defect edges were debeveled with a #15 scalpel blade.  Given the location of the defect and the proximity to free margins a Banner transposition flap was deemed most appropriate.  Using a sterile surgical marker, an appropriate flap drawn around the defect. The area thus outlined was incised deep to adipose tissue with a #15 scalpel blade.  The skin margins were undermined to an appropriate distance in all directions utilizing iris scissors. Home Suture Removal Text: Patient was provided a home suture removal kit and will remove their sutures at home.  If they have any questions or difficulties they will call the office. Cheek Interpolation Flap Text: A decision was made to reconstruct the defect utilizing an interpolation axial flap and a staged reconstruction.  A telfa template was made of the defect.  This telfa template was then used to outline the Cheek Interpolation flap.  The donor area for the pedicle flap was then injected with anesthesia.  The flap was excised through the skin and subcutaneous tissue down to the layer of the underlying musculature.  The interpolation flap was carefully excised within this deep plane to maintain its blood supply.  The edges of the donor site were undermined.   The donor site was closed in a primary fashion.  The pedicle was then rotated into position and sutured.  Once the tube was sutured into place, adequate blood supply was confirmed with blanching and refill.  The pedicle was then wrapped with xeroform gauze and dressed appropriately with a telfa and gauze bandage to ensure continued blood supply and protect the attached pedicle. Z Plasty Text: The lesion was extirpated to the level of the fat with a #15 scalpel blade.  Given the location of the defect, shape of the defect and the proximity to free margins a Z-plasty was deemed most appropriate for repair.  Using a sterile surgical marker, the appropriate transposition arms of the Z-plasty were drawn incorporating the defect and placing the expected incisions within the relaxed skin tension lines where possible.    The area thus outlined was incised deep to adipose tissue with a #15 scalpel blade.  The skin margins were undermined to an appropriate distance in all directions utilizing iris scissors.  The opposing transposition arms were then transposed into place in opposite direction and anchored with interrupted buried subcutaneous sutures.

## 2023-12-28 NOTE — PROGRESS NOTE ADULT - SUBJECTIVE AND OBJECTIVE BOX
Patient is a 68y old  Male who presents with a chief complaint of COVID-19 (28 Dec 2023 03:32)      SUBJECTIVE / OVERNIGHT EVENTS: Patient seen and examined at bedside. No acute events overnight. Fevers improving. +cough, +nasal congestion. Denies SOB.    MEDICATIONS  (STANDING):  aspirin enteric coated 81 milliGRAM(s) Oral daily  atorvastatin 20 milliGRAM(s) Oral at bedtime  benzonatate 100 milliGRAM(s) Oral every 8 hours  chlorhexidine 2% Cloths 1 Application(s) Topical daily  enoxaparin Injectable 40 milliGRAM(s) SubCutaneous every 24 hours  fluticasone propionate 50 MICROgram(s)/spray Nasal Spray 1 Spray(s) Both Nostrils two times a day  guaiFENesin  milliGRAM(s) Oral every 12 hours  magnesium oxide 400 milliGRAM(s) Oral daily  multivitamin 1 Tablet(s) Oral daily  mycophenolate mofetil 500 milliGRAM(s) Oral two times a day  NIFEdipine XL 30 milliGRAM(s) Oral every 24 hours  remdesivir  IVPB   IV Intermittent   senna 1 Tablet(s) Oral at bedtime  sildenafil (REVATIO) 10 milliGRAM(s) Oral two times a day  tacrolimus ER Tablet (ENVARSUS XR) 3 milliGRAM(s) Oral <User Schedule>    MEDICATIONS  (PRN):  acetaminophen     Tablet .. 650 milliGRAM(s) Oral every 6 hours PRN Temp greater or equal to 38C (100.4F), Mild Pain (1 - 3), Moderate Pain (4 - 6), Severe Pain (7 - 10)  sodium chloride 0.65% Nasal 1 Spray(s) Both Nostrils four times a day PRN Nasal Congestion      CAPILLARY BLOOD GLUCOSE        I&O's Summary      PHYSICAL EXAM:  Vital Signs Last 24 Hrs  T(C): 37.1 (28 Dec 2023 09:58), Max: 38.5 (27 Dec 2023 18:57)  T(F): 98.7 (28 Dec 2023 09:58), Max: 101.3 (27 Dec 2023 18:57)  HR: 113 (28 Dec 2023 09:58) (105 - 136)  BP: 131/87 (28 Dec 2023 09:58) (108/72 - 131/89)  BP(mean): 103 (28 Dec 2023 06:00) (84 - 103)  RR: 18 (28 Dec 2023 09:58) (18 - 22)  SpO2: 99% (28 Dec 2023 09:58) (98% - 99%)    Parameters below as of 28 Dec 2023 09:58  Patient On (Oxygen Delivery Method): room air        GEN: male in NAD, appears comfortable, no diaphoresis  EYES: No scleral injection, EOMI  ENTM: neck supple & symmetric without tracheal deviation, moist membranes, no gross hearing impairment, thyroid gland not enlarged  CV: +S1/S2, no m/r/g, no abdominal bruit, no LE edema  RESP: breathing comfortably, no respiratory accessory muscle use, CTAB, no w/r/r  GI: normoactive BS, soft, NTND, no rebounding/guarding, no palpable masses    LABS:                        15.2   12.50 )-----------( 237      ( 27 Dec 2023 17:42 )             46.5     12-27    136  |  97  |  18  ----------------------------<  142<H>  4.1   |  26  |  1.30    Ca    10.1      27 Dec 2023 17:42    TPro  7.7  /  Alb  4.5  /  TBili  0.8  /  DBili  x   /  AST  15  /  ALT  12  /  AlkPhos  123<H>  12-27    PT/INR - ( 27 Dec 2023 17:42 )   PT: 11.9 sec;   INR: 1.08 ratio         PTT - ( 27 Dec 2023 17:42 )  PTT:30.2 sec      Urinalysis Basic - ( 27 Dec 2023 17:42 )    Color: x / Appearance: x / SG: x / pH: x  Gluc: 142 mg/dL / Ketone: x  / Bili: x / Urobili: x   Blood: x / Protein: x / Nitrite: x   Leuk Esterase: x / RBC: x / WBC x   Sq Epi: x / Non Sq Epi: x / Bacteria: x          RADIOLOGY & ADDITIONAL TESTS:  Results Reviewed:   Imaging Personally Reviewed:  Electrocardiogram Personally Reviewed:    COORDINATION OF CARE:  Care Discussed with Consultants/Other Providers [Y/N]:  Prior or Outpatient Records Reviewed [Y/N]:

## 2023-12-28 NOTE — H&P ADULT - NSHPPHYSICALEXAM_GEN_ALL_CORE
Gen: no acute distress  HEENT: atraumatic, normocephalic, pupils equally round and reactive to light, extraocular muscles intact, no conjunctival injection  CV: +tachycardia   Resp: lungs clear to auscultation bilaterally, no rales, rhonchi, or wheezes  GI: soft, nontender, nondistended, BSx4  MSK: extremities atraumatic, no cyanosis or clubbing  Skin: warm, dry, no rashes or lesions  Neuro: no focal deficits, sensation grossly intact  Psych: alert and oriented x3, appropriate mood and affect

## 2023-12-28 NOTE — CONSULT NOTE ADULT - ASSESSMENT
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 He was found to be COVID positive and being treated with remdesevir.

## 2023-12-28 NOTE — H&P ADULT - NSHPREVIEWOFSYSTEMS_GEN_ALL_CORE
REVIEW OF SYSTEMS:  CONSTITUTIONAL: +fever, chills   EYES/ENT: +sore throat, nasal congestion   NECK: No pain or stiffness  RESPIRATORY: No cough, wheezing, hemoptysis; No shortness of breath  CARDIOVASCULAR: No chest pain or palpitations  GASTROINTESTINAL: No abdominal or epigastric pain. No nausea, vomiting, or hematemesis; No diarrhea or constipation. No melena or hematochezia.  GENITOURINARY: No dysuria, frequency or hematuria  NEUROLOGICAL: No syncope or dizziness  SKIN: No itching, rashes

## 2023-12-28 NOTE — H&P ADULT - ASSESSMENT
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

## 2023-12-29 LAB
ALBUMIN SERPL ELPH-MCNC: 3.9 G/DL — SIGNIFICANT CHANGE UP (ref 3.3–5)
ALBUMIN SERPL ELPH-MCNC: 3.9 G/DL — SIGNIFICANT CHANGE UP (ref 3.3–5)
ALP SERPL-CCNC: 100 U/L — SIGNIFICANT CHANGE UP (ref 40–120)
ALP SERPL-CCNC: 100 U/L — SIGNIFICANT CHANGE UP (ref 40–120)
ALT FLD-CCNC: 12 U/L — SIGNIFICANT CHANGE UP (ref 10–45)
ALT FLD-CCNC: 12 U/L — SIGNIFICANT CHANGE UP (ref 10–45)
ANION GAP SERPL CALC-SCNC: 15 MMOL/L — SIGNIFICANT CHANGE UP (ref 5–17)
ANION GAP SERPL CALC-SCNC: 15 MMOL/L — SIGNIFICANT CHANGE UP (ref 5–17)
AST SERPL-CCNC: 13 U/L — SIGNIFICANT CHANGE UP (ref 10–40)
AST SERPL-CCNC: 13 U/L — SIGNIFICANT CHANGE UP (ref 10–40)
BILIRUB SERPL-MCNC: 0.5 MG/DL — SIGNIFICANT CHANGE UP (ref 0.2–1.2)
BILIRUB SERPL-MCNC: 0.5 MG/DL — SIGNIFICANT CHANGE UP (ref 0.2–1.2)
BUN SERPL-MCNC: 16 MG/DL — SIGNIFICANT CHANGE UP (ref 7–23)
BUN SERPL-MCNC: 16 MG/DL — SIGNIFICANT CHANGE UP (ref 7–23)
CALCIUM SERPL-MCNC: 9.5 MG/DL — SIGNIFICANT CHANGE UP (ref 8.4–10.5)
CALCIUM SERPL-MCNC: 9.5 MG/DL — SIGNIFICANT CHANGE UP (ref 8.4–10.5)
CHLORIDE SERPL-SCNC: 100 MMOL/L — SIGNIFICANT CHANGE UP (ref 96–108)
CHLORIDE SERPL-SCNC: 100 MMOL/L — SIGNIFICANT CHANGE UP (ref 96–108)
CO2 SERPL-SCNC: 21 MMOL/L — LOW (ref 22–31)
CO2 SERPL-SCNC: 21 MMOL/L — LOW (ref 22–31)
CREAT SERPL-MCNC: 0.99 MG/DL — SIGNIFICANT CHANGE UP (ref 0.5–1.3)
CREAT SERPL-MCNC: 0.99 MG/DL — SIGNIFICANT CHANGE UP (ref 0.5–1.3)
EBV DNA SERPL NAA+PROBE-ACNC: SIGNIFICANT CHANGE UP IU/ML
EBV DNA SERPL NAA+PROBE-ACNC: SIGNIFICANT CHANGE UP IU/ML
EBVPCR LOG: SIGNIFICANT CHANGE UP LOG10IU/ML
EBVPCR LOG: SIGNIFICANT CHANGE UP LOG10IU/ML
EGFR: 84 ML/MIN/1.73M2 — SIGNIFICANT CHANGE UP
EGFR: 84 ML/MIN/1.73M2 — SIGNIFICANT CHANGE UP
GLUCOSE SERPL-MCNC: 118 MG/DL — HIGH (ref 70–99)
GLUCOSE SERPL-MCNC: 118 MG/DL — HIGH (ref 70–99)
HCT VFR BLD CALC: 42 % — SIGNIFICANT CHANGE UP (ref 39–50)
HCT VFR BLD CALC: 42 % — SIGNIFICANT CHANGE UP (ref 39–50)
HGB BLD-MCNC: 14.1 G/DL — SIGNIFICANT CHANGE UP (ref 13–17)
HGB BLD-MCNC: 14.1 G/DL — SIGNIFICANT CHANGE UP (ref 13–17)
MCHC RBC-ENTMCNC: 28.3 PG — SIGNIFICANT CHANGE UP (ref 27–34)
MCHC RBC-ENTMCNC: 28.3 PG — SIGNIFICANT CHANGE UP (ref 27–34)
MCHC RBC-ENTMCNC: 33.6 GM/DL — SIGNIFICANT CHANGE UP (ref 32–36)
MCHC RBC-ENTMCNC: 33.6 GM/DL — SIGNIFICANT CHANGE UP (ref 32–36)
MCV RBC AUTO: 84.3 FL — SIGNIFICANT CHANGE UP (ref 80–100)
MCV RBC AUTO: 84.3 FL — SIGNIFICANT CHANGE UP (ref 80–100)
NRBC # BLD: 0 /100 WBCS — SIGNIFICANT CHANGE UP (ref 0–0)
NRBC # BLD: 0 /100 WBCS — SIGNIFICANT CHANGE UP (ref 0–0)
PLATELET # BLD AUTO: 202 K/UL — SIGNIFICANT CHANGE UP (ref 150–400)
PLATELET # BLD AUTO: 202 K/UL — SIGNIFICANT CHANGE UP (ref 150–400)
POTASSIUM SERPL-MCNC: 4.1 MMOL/L — SIGNIFICANT CHANGE UP (ref 3.5–5.3)
POTASSIUM SERPL-MCNC: 4.1 MMOL/L — SIGNIFICANT CHANGE UP (ref 3.5–5.3)
POTASSIUM SERPL-SCNC: 4.1 MMOL/L — SIGNIFICANT CHANGE UP (ref 3.5–5.3)
POTASSIUM SERPL-SCNC: 4.1 MMOL/L — SIGNIFICANT CHANGE UP (ref 3.5–5.3)
PROT SERPL-MCNC: 6.8 G/DL — SIGNIFICANT CHANGE UP (ref 6–8.3)
PROT SERPL-MCNC: 6.8 G/DL — SIGNIFICANT CHANGE UP (ref 6–8.3)
RBC # BLD: 4.98 M/UL — SIGNIFICANT CHANGE UP (ref 4.2–5.8)
RBC # BLD: 4.98 M/UL — SIGNIFICANT CHANGE UP (ref 4.2–5.8)
RBC # FLD: 13.1 % — SIGNIFICANT CHANGE UP (ref 10.3–14.5)
RBC # FLD: 13.1 % — SIGNIFICANT CHANGE UP (ref 10.3–14.5)
SODIUM SERPL-SCNC: 136 MMOL/L — SIGNIFICANT CHANGE UP (ref 135–145)
SODIUM SERPL-SCNC: 136 MMOL/L — SIGNIFICANT CHANGE UP (ref 135–145)
TACROLIMUS SERPL-MCNC: 7.7 NG/ML — SIGNIFICANT CHANGE UP
TACROLIMUS SERPL-MCNC: 7.7 NG/ML — SIGNIFICANT CHANGE UP
WBC # BLD: 11.73 K/UL — HIGH (ref 3.8–10.5)
WBC # BLD: 11.73 K/UL — HIGH (ref 3.8–10.5)
WBC # FLD AUTO: 11.73 K/UL — HIGH (ref 3.8–10.5)
WBC # FLD AUTO: 11.73 K/UL — HIGH (ref 3.8–10.5)

## 2023-12-29 PROCEDURE — 99233 SBSQ HOSP IP/OBS HIGH 50: CPT

## 2023-12-29 RX ORDER — BENZOCAINE AND MENTHOL 5; 1 G/100ML; G/100ML
1 LIQUID ORAL
Refills: 0 | Status: DISCONTINUED | OUTPATIENT
Start: 2023-12-29 | End: 2024-01-02

## 2023-12-29 RX ORDER — INFLUENZA VIRUS VACCINE 15; 15; 15; 15 UG/.5ML; UG/.5ML; UG/.5ML; UG/.5ML
0.7 SUSPENSION INTRAMUSCULAR ONCE
Refills: 0 | Status: DISCONTINUED | OUTPATIENT
Start: 2023-12-29 | End: 2024-01-02

## 2023-12-29 RX ADMIN — Medication 600 MILLIGRAM(S): at 05:21

## 2023-12-29 RX ADMIN — ATORVASTATIN CALCIUM 20 MILLIGRAM(S): 80 TABLET, FILM COATED ORAL at 21:16

## 2023-12-29 RX ADMIN — Medication 10 MILLIGRAM(S): at 08:39

## 2023-12-29 RX ADMIN — Medication 100 MILLIGRAM(S): at 21:16

## 2023-12-29 RX ADMIN — Medication 10 MILLIGRAM(S): at 18:08

## 2023-12-29 RX ADMIN — Medication 650 MILLIGRAM(S): at 17:09

## 2023-12-29 RX ADMIN — TACROLIMUS 3 MILLIGRAM(S): 5 CAPSULE ORAL at 08:36

## 2023-12-29 RX ADMIN — Medication 81 MILLIGRAM(S): at 10:57

## 2023-12-29 RX ADMIN — Medication 1 TABLET(S): at 10:57

## 2023-12-29 RX ADMIN — Medication 1 SPRAY(S): at 17:14

## 2023-12-29 RX ADMIN — Medication 600 MILLIGRAM(S): at 17:14

## 2023-12-29 RX ADMIN — Medication 30 MILLIGRAM(S): at 08:36

## 2023-12-29 RX ADMIN — ENOXAPARIN SODIUM 40 MILLIGRAM(S): 100 INJECTION SUBCUTANEOUS at 05:20

## 2023-12-29 RX ADMIN — BENZOCAINE AND MENTHOL 1 LOZENGE: 5; 1 LIQUID ORAL at 10:58

## 2023-12-29 RX ADMIN — Medication 100 MILLIGRAM(S): at 10:57

## 2023-12-29 RX ADMIN — MAGNESIUM OXIDE 400 MG ORAL TABLET 400 MILLIGRAM(S): 241.3 TABLET ORAL at 10:57

## 2023-12-29 RX ADMIN — Medication 650 MILLIGRAM(S): at 15:07

## 2023-12-29 RX ADMIN — Medication 1 SPRAY(S): at 05:23

## 2023-12-29 RX ADMIN — CHLORHEXIDINE GLUCONATE 1 APPLICATION(S): 213 SOLUTION TOPICAL at 10:29

## 2023-12-29 RX ADMIN — Medication 100 MILLIGRAM(S): at 05:23

## 2023-12-29 RX ADMIN — REMDESIVIR 200 MILLIGRAM(S): 5 INJECTION INTRAVENOUS at 10:57

## 2023-12-29 NOTE — PROGRESS NOTE ADULT - SUBJECTIVE AND OBJECTIVE BOX
66M with ACC/AHA Stage D mixed NICM/ICM s/p PPM upgraded to CRT-D, CAD s/p PCI to mLAD 4/2022, eventually s/p OHT on 6/21/22 (CMV +/+, Toxo -/-), course most recently complicated with rejection treated with pulse dose steroids 6.2023, then on higher cellcept ./tacr,  norovirus in July with now resolved diarrhea , admitted in september with non COVID coronavirus, now sent to ER with COVID 19    Feeling horrible, with cough, congestion no diarrhea- remdesivir started  =    Vital Signs Last 24 Hrs  T(C): 37 (29 Dec 2023 11:03), Max: 38 (28 Dec 2023 19:25)  T(F): 98.6 (29 Dec 2023 11:03), Max: 100.4 (28 Dec 2023 19:25)  HR: 108 (29 Dec 2023 11:03) (107 - 124)  BP: 108/67 (29 Dec 2023 11:03) (101/65 - 131/87)  BP(mean): 96 (28 Dec 2023 20:28) (96 - 101)  RR: 18 (29 Dec 2023 11:03) (16 - 20)  SpO2: 95% (29 Dec 2023 11:03) (93% - 98%)    Parameters below as of 29 Dec 2023 11:03  Patient On (Oxygen Delivery Method): room air          CONSTITUTIONAL: Well groomed, no apparent distress  EYES: PERRLA and symmetric, EOMI, No conjunctival or scleral injection, non-icteric  ENMT: Oral mucosa with moist membranes. Normal dentition; no pharyngeal injection or exudates             NECK: Supple, symmetric and without tracheal deviation   RESP: No respiratory distress, no use of accessory muscles; CTA b/l, no WRR  CV: RRR, +S1S2, no MRG; no JVD; no peripheral edema  GI: Soft, NT, ND, no rebound, no guarding; no palpable masses; no hepatosplenomegaly; no hernia palpated  LYMPH: No cervical LAD or tenderness; no axillary LAD or tenderness; no inguinal LAD or tenderness  MSK: Normal gait; No digital clubbing or cyanosis; examination of the (head/neck/spine/ribs/pelvis, RUE, LUE, RLE, LLE) without misalignment,            Normal ROM without pain, no spinal tenderness, normal muscle strength/tone  SKIN: No rashes or ulcers noted; no subcutaneous nodules or induration palpable          ____________________________________________________  ROS  GENERAL: denies chills, , night sweats, weight loss.   PSYCH: denies depression, anxiety, suicidal ideation, hallucination, and delusions  SKIN: no rash or lesions; no color changes, no abnormal nevi,no  dryness, and nojaundice    EYES: denies visual changes, floaters, pain, inflammation, blurred vision, and discharge  ENT: denies tinnitus, vertigo, epistaxis, oral lesion, and decreased acuity  PULM: denies, hemoptysis, pleurisy  CVS: denies angina, palpitations,+ orthopnea, no syncope, or heart murmur  GI: denies constipation, diarrhea, melena, abdominal pain, nausea.   : denies dysuria, frequency, discharge, incontinence, stones or macroscopic hematuria  MS: no arthralgias, no erythema or swelling, no myalgias, noedema, or lower back pain.   CNS: denies numbness, dizziness, seizure, or tremor  ENDO: denies heat/cold intolerance, polyuria, polydipsia, malaise.    HEME: denies bruising, bleeding, lymphadenopathy, anemia, and calf pain    Allergies  No Known Allergies    __________________________________________________  MEDS:  MEDICATIONS  (STANDING):  acetaminophen     Tablet .. 650 every 6 hours PRN  aspirin enteric coated 81 daily  atorvastatin 20 at bedtime  benzonatate 100 every 8 hours  enoxaparin Injectable 40 every 24 hours  guaiFENesin  every 12 hours  influenza  Vaccine (HIGH DOSE) 0.7 once  NIFEdipine XL 30 every 24 hours  senna 1 at bedtime  sildenafil (REVATIO) 10 two times a day  tacrolimus ER Tablet (ENVARSUS XR) 3 <User Schedule>    _________________________________________________  ANTIMICOBIALS  influenza  Vaccine (HIGH DOSE) 0.7 once  remdesivir  IVPB 100 every 24 hours  remdesivir  IVPB    tacrolimus ER Tablet (ENVARSUS XR) 3 <User Schedule>      GENERAL LABS              14.1                 136  | 21   | 16           11.73 >-----------< 202     ------------------------< 118                   42.0                 4.1  | 100  | 0.99                                         Ca 9.5   Mg x     Ph x          Urinalysis Basic - ( 29 Dec 2023 07:54 )    Color: x / Appearance: x / SG: x / pH: x  Gluc: 118 mg/dL / Ketone: x  / Bili: x / Urobili: x   Blood: x / Protein: x / Nitrite: x   Leuk Esterase: x / RBC: x / WBC x   Sq Epi: x / Non Sq Epi: x / Bacteria: x        _________________________________________________  MICROBIOLOGY  -----------    Culture - Blood (collected 27 Dec 2023 17:30)  Source: .Blood Blood-Peripheral  Preliminary Report (28 Dec 2023 22:02):    No growth at 24 hours    Culture - Blood (collected 27 Dec 2023 17:20)  Source: .Blood Blood-Peripheral  Preliminary Report (28 Dec 2023 22:02):    No growth at 24 hours            Rapid RVP Result: Detected (12-27 @ 17:56)  CMVPCR Log: NotDetec [id number]/mL (12-27 @ 17:42)          Fungitell:   _______________________________________________  PERTINENT IMAGING     66M with ACC/AHA Stage D mixed NICM/ICM s/p PPM upgraded to CRT-D, CAD s/p PCI to mLAD 4/2022, eventually s/p OHT on 6/21/22 (CMV +/+, Toxo -/-), course most recently complicated with rejection treated with pulse dose steroids 6.2023, then on higher cellcept ./tacr,  norovirus in July with now resolved diarrhea , admitted in september with non COVID coronavirus, now sent to ER with COVID 19    Feeling horrible, with cough, congestion no diarrhea- remdesivir started  =    Vital Signs Last 24 Hrs  T(C): 37 (29 Dec 2023 11:03), Max: 38 (28 Dec 2023 19:25)  T(F): 98.6 (29 Dec 2023 11:03), Max: 100.4 (28 Dec 2023 19:25)  HR: 108 (29 Dec 2023 11:03) (107 - 124)  BP: 108/67 (29 Dec 2023 11:03) (101/65 - 131/87)  BP(mean): 96 (28 Dec 2023 20:28) (96 - 101)  RR: 18 (29 Dec 2023 11:03) (16 - 20)  SpO2: 95% (29 Dec 2023 11:03) (93% - 98%)    Parameters below as of 29 Dec 2023 11:03  Patient On (Oxygen Delivery Method): room air          CONSTITUTIONAL: Well groomed, no apparent distress  EYES: PERRLA and symmetric, EOMI, No conjunctival or scleral injection, non-icteric  ENMT: Oral mucosa with moist membranes. Normal dentition; no pharyngeal injection or exudates             NECK: Supple, symmetric and without tracheal deviation   RESP: No respiratory distress, no use of accessory muscles; CTA b/l, no WRR  CV: RRR, +S1S2, no MRG; no JVD; no peripheral edema  GI: Soft, NT, ND, no rebound, no guarding; no palpable masses; no hepatosplenomegaly; no hernia palpated  LYMPH: No cervical LAD or tenderness; no axillary LAD or tenderness; no inguinal LAD or tenderness  MSK: Normal gait; No digital clubbing or cyanosis; examination of the (head/neck/spine/ribs/pelvis, RUE, LUE, RLE, LLE) without misalignment,            Normal ROM without pain, no spinal tenderness, normal muscle strength/tone  SKIN: No rashes or ulcers noted; no subcutaneous nodules or induration palpable          ____________________________________________________  ROS  GENERAL: denies chills, , night sweats, weight loss.   PSYCH: denies depression, anxiety, suicidal ideation, hallucination, and delusions  SKIN: no rash or lesions; no color changes, no abnormal nevi,no  dryness, and nojaundice    EYES: denies visual changes, floaters, pain, inflammation, blurred vision, and discharge  ENT: denies tinnitus, vertigo, epistaxis, oral lesion, and decreased acuity  PULM: denies, hemoptysis, pleurisy  CVS: denies angina, palpitations,+ orthopnea, no syncope, or heart murmur  GI: denies constipation, diarrhea, melena, abdominal pain, nausea.   : denies dysuria, frequency, discharge, incontinence, stones or macroscopic hematuria  MS: no arthralgias, no erythema or swelling, no myalgias, noedema, or lower back pain.   CNS: denies numbness, dizziness, seizure, or tremor  ENDO: denies heat/cold intolerance, polyuria, polydipsia, malaise.    HEME: denies bruising, bleeding, lymphadenopathy, anemia, and calf pain    Allergies  No Known Allergies    __________________________________________________  MEDS:  MEDICATIONS  (STANDING):  acetaminophen     Tablet .. 650 every 6 hours PRN  aspirin enteric coated 81 daily  atorvastatin 20 at bedtime  benzonatate 100 every 8 hours  enoxaparin Injectable 40 every 24 hours  guaiFENesin  every 12 hours  influenza  Vaccine (HIGH DOSE) 0.7 once  NIFEdipine XL 30 every 24 hours  senna 1 at bedtime  sildenafil (REVATIO) 10 two times a day  tacrolimus ER Tablet (ENVARSUS XR) 3 <User Schedule>    _________________________________________________  ANTIMICOBIALS  influenza  Vaccine (HIGH DOSE) 0.7 once  remdesivir  IVPB 100 every 24 hours  remdesivir  IVPB    tacrolimus ER Tablet (ENVARSUS XR) 3 <User Schedule>      GENERAL LABS              14.1                 136  | 21   | 16           11.73 >-----------< 202     ------------------------< 118                   42.0                 4.1  | 100  | 0.99                                         Ca 9.5   Mg x     Ph x          Urinalysis Basic - ( 29 Dec 2023 07:54 )    Color: x / Appearance: x / SG: x / pH: x  Gluc: 118 mg/dL / Ketone: x  / Bili: x / Urobili: x   Blood: x / Protein: x / Nitrite: x   Leuk Esterase: x / RBC: x / WBC x   Sq Epi: x / Non Sq Epi: x / Bacteria: x        _________________________________________________  MICROBIOLOGY  -----------    Culture - Blood (collected 27 Dec 2023 17:30)  Source: .Blood Blood-Peripheral  Preliminary Report (28 Dec 2023 22:02):    No growth at 24 hours    Culture - Blood (collected 27 Dec 2023 17:20)  Source: .Blood Blood-Peripheral  Preliminary Report (28 Dec 2023 22:02):    No growth at 24 hours            Rapid RVP Result: Detected (12-27 @ 17:56)  CMVPCR Log: NotDetec [id number]/mL (12-27 @ 17:42)          Fungitell:   _______________________________________________  PERTINENT IMAGING

## 2023-12-29 NOTE — PROGRESS NOTE ADULT - ASSESSMENT
66M with ACC/AHA Stage D mixed NICM/ICM s/p PPM upgraded to CRT-D, CAD s/p PCI to mLAD 4/2022, eventually s/p OHT on 6/21/22 (CMV +/+, Toxo -/-), course most recently complicated with rejection treated with pulse dose steroids 6.2023, then on higher cellcept ./tacr,  norovirus in July with now resolved diarrhea , admitted in september with non COVID coronavirus, now sent to ER with symptoms of cough malaise, muscle aches and fever to 101.3.       1. s/p OHT on 6/21/22 (CMV +/+, Toxo -/-), for NICM/ICM  simuleect induction, tacro, MMF  Complications ACR 6/2023 treated with pulse dose steroidis      2. COVID 19 LRTI  CT chest with GGo, on RA    Recommendations  Complete 5 days of remdesivir in light of LRTI - day 3 today  Fever curve improving    Thank you for involving us in the care of this patient  Transplant ID will continue to follow  Please call or page with additional questions  Pager; #[phone]  Teams: from 8 am to 5 pm  Keke Escamilla MD         66M with ACC/AHA Stage D mixed NICM/ICM s/p PPM upgraded to CRT-D, CAD s/p PCI to mLAD 4/2022, eventually s/p OHT on 6/21/22 (CMV +/+, Toxo -/-), course most recently complicated with rejection treated with pulse dose steroids 6.2023, then on higher cellcept ./tacr,  norovirus in July with now resolved diarrhea , admitted in september with non COVID coronavirus, now sent to ER with symptoms of cough malaise, muscle aches and fever to 101.3.       1. s/p OHT on 6/21/22 (CMV +/+, Toxo -/-), for NICM/ICM  simuleect induction, tacro, MMF  Complications ACR 6/2023 treated with pulse dose steroidis      2. COVID 19 LRTI  CT chest with GGo, on RA    Recommendations  Complete 5 days of remdesivir in light of LRTI - day 3 today  Fever curve improving    Thank you for involving us in the care of this patient  Transplant ID will continue to follow  Please call or page with additional questions  Pager; #[phone]  Teams: from 8 am to 5 pm  Keke Escamilla MD

## 2023-12-29 NOTE — PROGRESS NOTE ADULT - SUBJECTIVE AND OBJECTIVE BOX
Patient is a 68y old  Male who presents with a chief complaint of COVID-19 (29 Dec 2023 04:06)      SUBJECTIVE / OVERNIGHT EVENTS: Patient seen and examined at bedside. No acute events overnight. Cough improving, but now with sore throat. Nasal congestion slightly better. Denies SOB.    MEDICATIONS  (STANDING):  aspirin enteric coated 81 milliGRAM(s) Oral daily  atorvastatin 20 milliGRAM(s) Oral at bedtime  benzonatate 100 milliGRAM(s) Oral every 8 hours  chlorhexidine 2% Cloths 1 Application(s) Topical daily  enoxaparin Injectable 40 milliGRAM(s) SubCutaneous every 24 hours  fluticasone propionate 50 MICROgram(s)/spray Nasal Spray 1 Spray(s) Both Nostrils two times a day  guaiFENesin  milliGRAM(s) Oral every 12 hours  influenza  Vaccine (HIGH DOSE) 0.7 milliLiter(s) IntraMuscular once  magnesium oxide 400 milliGRAM(s) Oral daily  multivitamin 1 Tablet(s) Oral daily  NIFEdipine XL 30 milliGRAM(s) Oral every 24 hours  remdesivir  IVPB   IV Intermittent   remdesivir  IVPB 100 milliGRAM(s) IV Intermittent every 24 hours  senna 1 Tablet(s) Oral at bedtime  sildenafil (REVATIO) 10 milliGRAM(s) Oral two times a day  tacrolimus ER Tablet (ENVARSUS XR) 3 milliGRAM(s) Oral <User Schedule>    MEDICATIONS  (PRN):  acetaminophen     Tablet .. 650 milliGRAM(s) Oral every 6 hours PRN Temp greater or equal to 38C (100.4F), Mild Pain (1 - 3), Moderate Pain (4 - 6), Severe Pain (7 - 10)  sodium chloride 0.65% Nasal 1 Spray(s) Both Nostrils four times a day PRN Nasal Congestion      CAPILLARY BLOOD GLUCOSE        I&O's Summary    29 Dec 2023 07:01  -  29 Dec 2023 09:52  --------------------------------------------------------  IN: 360 mL / OUT: 0 mL / NET: 360 mL        PHYSICAL EXAM:  Vital Signs Last 24 Hrs  T(C): 37.4 (29 Dec 2023 04:55), Max: 38 (28 Dec 2023 19:25)  T(F): 99.4 (29 Dec 2023 04:55), Max: 100.4 (28 Dec 2023 19:25)  HR: 107 (29 Dec 2023 04:55) (107 - 124)  BP: 114/76 (29 Dec 2023 04:55) (101/65 - 131/87)  BP(mean): 96 (28 Dec 2023 20:28) (96 - 101)  RR: 18 (29 Dec 2023 04:55) (16 - 20)  SpO2: 93% (29 Dec 2023 04:55) (93% - 99%)    Parameters below as of 29 Dec 2023 04:55  Patient On (Oxygen Delivery Method): room air        GEN: male in NAD, appears comfortable, no diaphoresis  EYES: No scleral injection, EOMI  ENTM: neck supple & symmetric without tracheal deviation, moist membranes, no gross hearing impairment, thyroid gland not enlarged  CV: +S1/S2, no m/r/g, no abdominal bruit, no LE edema  RESP: breathing comfortably, no respiratory accessory muscle use, CTAB, no w/r/r  GI: normoactive BS, soft, NTND, no rebounding/guarding, no palpable masses    LABS:                        14.1   11.73 )-----------( 202      ( 29 Dec 2023 07:54 )             42.0     12-29    136  |  100  |  16  ----------------------------<  118<H>  4.1   |  21<L>  |  0.99    Ca    9.5      29 Dec 2023 07:54    TPro  6.8  /  Alb  3.9  /  TBili  0.5  /  DBili  x   /  AST  13  /  ALT  12  /  AlkPhos  100  12-29    PT/INR - ( 27 Dec 2023 17:42 )   PT: 11.9 sec;   INR: 1.08 ratio         PTT - ( 27 Dec 2023 17:42 )  PTT:30.2 sec      Urinalysis Basic - ( 29 Dec 2023 07:54 )    Color: x / Appearance: x / SG: x / pH: x  Gluc: 118 mg/dL / Ketone: x  / Bili: x / Urobili: x   Blood: x / Protein: x / Nitrite: x   Leuk Esterase: x / RBC: x / WBC x   Sq Epi: x / Non Sq Epi: x / Bacteria: x        Culture - Blood (collected 27 Dec 2023 17:30)  Source: .Blood Blood-Peripheral  Preliminary Report (28 Dec 2023 22:02):    No growth at 24 hours    Culture - Blood (collected 27 Dec 2023 17:20)  Source: .Blood Blood-Peripheral  Preliminary Report (28 Dec 2023 22:02):    No growth at 24 hours        RADIOLOGY & ADDITIONAL TESTS:  Results Reviewed:   Imaging Personally Reviewed:  Electrocardiogram Personally Reviewed:    COORDINATION OF CARE:  Care Discussed with Consultants/Other Providers [Y/N]:  Prior or Outpatient Records Reviewed [Y/N]:

## 2023-12-30 LAB
ALBUMIN SERPL ELPH-MCNC: 4 G/DL — SIGNIFICANT CHANGE UP (ref 3.3–5)
ALBUMIN SERPL ELPH-MCNC: 4 G/DL — SIGNIFICANT CHANGE UP (ref 3.3–5)
ALP SERPL-CCNC: 96 U/L — SIGNIFICANT CHANGE UP (ref 40–120)
ALP SERPL-CCNC: 96 U/L — SIGNIFICANT CHANGE UP (ref 40–120)
ALT FLD-CCNC: 11 U/L — SIGNIFICANT CHANGE UP (ref 10–45)
ALT FLD-CCNC: 11 U/L — SIGNIFICANT CHANGE UP (ref 10–45)
ANION GAP SERPL CALC-SCNC: 12 MMOL/L — SIGNIFICANT CHANGE UP (ref 5–17)
ANION GAP SERPL CALC-SCNC: 12 MMOL/L — SIGNIFICANT CHANGE UP (ref 5–17)
AST SERPL-CCNC: 13 U/L — SIGNIFICANT CHANGE UP (ref 10–40)
AST SERPL-CCNC: 13 U/L — SIGNIFICANT CHANGE UP (ref 10–40)
BILIRUB SERPL-MCNC: 0.4 MG/DL — SIGNIFICANT CHANGE UP (ref 0.2–1.2)
BILIRUB SERPL-MCNC: 0.4 MG/DL — SIGNIFICANT CHANGE UP (ref 0.2–1.2)
BUN SERPL-MCNC: 20 MG/DL — SIGNIFICANT CHANGE UP (ref 7–23)
BUN SERPL-MCNC: 20 MG/DL — SIGNIFICANT CHANGE UP (ref 7–23)
CALCIUM SERPL-MCNC: 9.4 MG/DL — SIGNIFICANT CHANGE UP (ref 8.4–10.5)
CALCIUM SERPL-MCNC: 9.4 MG/DL — SIGNIFICANT CHANGE UP (ref 8.4–10.5)
CHLORIDE SERPL-SCNC: 103 MMOL/L — SIGNIFICANT CHANGE UP (ref 96–108)
CHLORIDE SERPL-SCNC: 103 MMOL/L — SIGNIFICANT CHANGE UP (ref 96–108)
CO2 SERPL-SCNC: 23 MMOL/L — SIGNIFICANT CHANGE UP (ref 22–31)
CO2 SERPL-SCNC: 23 MMOL/L — SIGNIFICANT CHANGE UP (ref 22–31)
CREAT SERPL-MCNC: 1.09 MG/DL — SIGNIFICANT CHANGE UP (ref 0.5–1.3)
CREAT SERPL-MCNC: 1.09 MG/DL — SIGNIFICANT CHANGE UP (ref 0.5–1.3)
EGFR: 75 ML/MIN/1.73M2 — SIGNIFICANT CHANGE UP
EGFR: 75 ML/MIN/1.73M2 — SIGNIFICANT CHANGE UP
GLUCOSE SERPL-MCNC: 105 MG/DL — HIGH (ref 70–99)
GLUCOSE SERPL-MCNC: 105 MG/DL — HIGH (ref 70–99)
HCT VFR BLD CALC: 40.5 % — SIGNIFICANT CHANGE UP (ref 39–50)
HCT VFR BLD CALC: 40.5 % — SIGNIFICANT CHANGE UP (ref 39–50)
HGB BLD-MCNC: 13.7 G/DL — SIGNIFICANT CHANGE UP (ref 13–17)
HGB BLD-MCNC: 13.7 G/DL — SIGNIFICANT CHANGE UP (ref 13–17)
MCHC RBC-ENTMCNC: 28.8 PG — SIGNIFICANT CHANGE UP (ref 27–34)
MCHC RBC-ENTMCNC: 28.8 PG — SIGNIFICANT CHANGE UP (ref 27–34)
MCHC RBC-ENTMCNC: 33.8 GM/DL — SIGNIFICANT CHANGE UP (ref 32–36)
MCHC RBC-ENTMCNC: 33.8 GM/DL — SIGNIFICANT CHANGE UP (ref 32–36)
MCV RBC AUTO: 85.1 FL — SIGNIFICANT CHANGE UP (ref 80–100)
MCV RBC AUTO: 85.1 FL — SIGNIFICANT CHANGE UP (ref 80–100)
NRBC # BLD: 0 /100 WBCS — SIGNIFICANT CHANGE UP (ref 0–0)
NRBC # BLD: 0 /100 WBCS — SIGNIFICANT CHANGE UP (ref 0–0)
PLATELET # BLD AUTO: 219 K/UL — SIGNIFICANT CHANGE UP (ref 150–400)
PLATELET # BLD AUTO: 219 K/UL — SIGNIFICANT CHANGE UP (ref 150–400)
POTASSIUM SERPL-MCNC: 4.3 MMOL/L — SIGNIFICANT CHANGE UP (ref 3.5–5.3)
POTASSIUM SERPL-MCNC: 4.3 MMOL/L — SIGNIFICANT CHANGE UP (ref 3.5–5.3)
POTASSIUM SERPL-SCNC: 4.3 MMOL/L — SIGNIFICANT CHANGE UP (ref 3.5–5.3)
POTASSIUM SERPL-SCNC: 4.3 MMOL/L — SIGNIFICANT CHANGE UP (ref 3.5–5.3)
PROT SERPL-MCNC: 6.6 G/DL — SIGNIFICANT CHANGE UP (ref 6–8.3)
PROT SERPL-MCNC: 6.6 G/DL — SIGNIFICANT CHANGE UP (ref 6–8.3)
RBC # BLD: 4.76 M/UL — SIGNIFICANT CHANGE UP (ref 4.2–5.8)
RBC # BLD: 4.76 M/UL — SIGNIFICANT CHANGE UP (ref 4.2–5.8)
RBC # FLD: 13 % — SIGNIFICANT CHANGE UP (ref 10.3–14.5)
RBC # FLD: 13 % — SIGNIFICANT CHANGE UP (ref 10.3–14.5)
SODIUM SERPL-SCNC: 138 MMOL/L — SIGNIFICANT CHANGE UP (ref 135–145)
SODIUM SERPL-SCNC: 138 MMOL/L — SIGNIFICANT CHANGE UP (ref 135–145)
TACROLIMUS SERPL-MCNC: 9.7 NG/ML — SIGNIFICANT CHANGE UP
TACROLIMUS SERPL-MCNC: 9.7 NG/ML — SIGNIFICANT CHANGE UP
WBC # BLD: 9.84 K/UL — SIGNIFICANT CHANGE UP (ref 3.8–10.5)
WBC # BLD: 9.84 K/UL — SIGNIFICANT CHANGE UP (ref 3.8–10.5)
WBC # FLD AUTO: 9.84 K/UL — SIGNIFICANT CHANGE UP (ref 3.8–10.5)
WBC # FLD AUTO: 9.84 K/UL — SIGNIFICANT CHANGE UP (ref 3.8–10.5)

## 2023-12-30 PROCEDURE — 99233 SBSQ HOSP IP/OBS HIGH 50: CPT

## 2023-12-30 RX ORDER — TACROLIMUS 5 MG/1
2.75 CAPSULE ORAL
Refills: 0 | Status: DISCONTINUED | OUTPATIENT
Start: 2023-12-31 | End: 2024-01-01

## 2023-12-30 RX ADMIN — Medication 10 MILLIGRAM(S): at 05:14

## 2023-12-30 RX ADMIN — Medication 600 MILLIGRAM(S): at 17:32

## 2023-12-30 RX ADMIN — BENZOCAINE AND MENTHOL 1 LOZENGE: 5; 1 LIQUID ORAL at 08:38

## 2023-12-30 RX ADMIN — CHLORHEXIDINE GLUCONATE 1 APPLICATION(S): 213 SOLUTION TOPICAL at 11:41

## 2023-12-30 RX ADMIN — Medication 10 MILLIGRAM(S): at 17:32

## 2023-12-30 RX ADMIN — Medication 1 SPRAY(S): at 17:32

## 2023-12-30 RX ADMIN — TACROLIMUS 3 MILLIGRAM(S): 5 CAPSULE ORAL at 08:13

## 2023-12-30 RX ADMIN — Medication 100 MILLIGRAM(S): at 13:21

## 2023-12-30 RX ADMIN — ENOXAPARIN SODIUM 40 MILLIGRAM(S): 100 INJECTION SUBCUTANEOUS at 05:15

## 2023-12-30 RX ADMIN — Medication 100 MILLIGRAM(S): at 21:37

## 2023-12-30 RX ADMIN — Medication 81 MILLIGRAM(S): at 11:34

## 2023-12-30 RX ADMIN — Medication 1 SPRAY(S): at 05:14

## 2023-12-30 RX ADMIN — ATORVASTATIN CALCIUM 20 MILLIGRAM(S): 80 TABLET, FILM COATED ORAL at 21:37

## 2023-12-30 RX ADMIN — Medication 1 TABLET(S): at 11:34

## 2023-12-30 RX ADMIN — Medication 600 MILLIGRAM(S): at 05:14

## 2023-12-30 RX ADMIN — Medication 650 MILLIGRAM(S): at 21:36

## 2023-12-30 RX ADMIN — MAGNESIUM OXIDE 400 MG ORAL TABLET 400 MILLIGRAM(S): 241.3 TABLET ORAL at 11:34

## 2023-12-30 RX ADMIN — Medication 30 MILLIGRAM(S): at 08:14

## 2023-12-30 RX ADMIN — REMDESIVIR 200 MILLIGRAM(S): 5 INJECTION INTRAVENOUS at 10:04

## 2023-12-30 RX ADMIN — Medication 100 MILLIGRAM(S): at 05:14

## 2023-12-30 NOTE — PROGRESS NOTE ADULT - SUBJECTIVE AND OBJECTIVE BOX
Patient is a 68y old  Male who presents with a chief complaint of COVID-19 (30 Dec 2023 04:03)      SUBJECTIVE / OVERNIGHT EVENTS: Patient seen and examined at bedside. No acute events overnight. Patient with improving cough and nasal congestion.    MEDICATIONS  (STANDING):  aspirin enteric coated 81 milliGRAM(s) Oral daily  atorvastatin 20 milliGRAM(s) Oral at bedtime  benzonatate 100 milliGRAM(s) Oral every 8 hours  chlorhexidine 2% Cloths 1 Application(s) Topical daily  enoxaparin Injectable 40 milliGRAM(s) SubCutaneous every 24 hours  fluticasone propionate 50 MICROgram(s)/spray Nasal Spray 1 Spray(s) Both Nostrils two times a day  guaiFENesin  milliGRAM(s) Oral every 12 hours  influenza  Vaccine (HIGH DOSE) 0.7 milliLiter(s) IntraMuscular once  magnesium oxide 400 milliGRAM(s) Oral daily  multivitamin 1 Tablet(s) Oral daily  NIFEdipine XL 30 milliGRAM(s) Oral every 24 hours  remdesivir  IVPB 100 milliGRAM(s) IV Intermittent every 24 hours  remdesivir  IVPB   IV Intermittent   senna 1 Tablet(s) Oral at bedtime  sildenafil (REVATIO) 10 milliGRAM(s) Oral two times a day  tacrolimus ER Tablet (ENVARSUS XR) 3 milliGRAM(s) Oral <User Schedule>    MEDICATIONS  (PRN):  acetaminophen     Tablet .. 650 milliGRAM(s) Oral every 6 hours PRN Temp greater or equal to 38C (100.4F), Mild Pain (1 - 3), Moderate Pain (4 - 6), Severe Pain (7 - 10)  benzocaine/menthol Lozenge 1 Lozenge Oral every 2 hours PRN Sore Throat  sodium chloride 0.65% Nasal 1 Spray(s) Both Nostrils four times a day PRN Nasal Congestion      CAPILLARY BLOOD GLUCOSE        I&O's Summary    29 Dec 2023 07:01  -  30 Dec 2023 07:00  --------------------------------------------------------  IN: 960 mL / OUT: 0 mL / NET: 960 mL        PHYSICAL EXAM:  Vital Signs Last 24 Hrs  T(C): 37.1 (30 Dec 2023 08:10), Max: 37.2 (30 Dec 2023 04:47)  T(F): 98.8 (30 Dec 2023 08:10), Max: 98.9 (30 Dec 2023 04:47)  HR: 102 (30 Dec 2023 08:10) (102 - 108)  BP: 106/71 (30 Dec 2023 08:10) (106/71 - 118/74)  BP(mean): --  RR: 18 (30 Dec 2023 08:10) (18 - 18)  SpO2: 94% (30 Dec 2023 08:10) (94% - 96%)    Parameters below as of 30 Dec 2023 08:10  Patient On (Oxygen Delivery Method): room air        GEN: male in NAD, appears comfortable, no diaphoresis  EYES: No scleral injection, EOMI  ENTM: neck supple & symmetric without tracheal deviation, moist membranes, no gross hearing impairment, thyroid gland not enlarged  CV: +S1/S2, no m/r/g, no abdominal bruit, no LE edema  RESP: breathing comfortably, no respiratory accessory muscle use, CTAB, no w/r/r  GI: normoactive BS, soft, NTND, no rebounding/guarding, no palpable masses    LABS:                        13.7   9.84  )-----------( 219      ( 30 Dec 2023 07:00 )             40.5     12-30    138  |  103  |  20  ----------------------------<  105<H>  4.3   |  23  |  1.09    Ca    9.4      30 Dec 2023 06:58    TPro  6.6  /  Alb  4.0  /  TBili  0.4  /  DBili  x   /  AST  13  /  ALT  11  /  AlkPhos  96  12-30          Urinalysis Basic - ( 30 Dec 2023 06:58 )    Color: x / Appearance: x / SG: x / pH: x  Gluc: 105 mg/dL / Ketone: x  / Bili: x / Urobili: x   Blood: x / Protein: x / Nitrite: x   Leuk Esterase: x / RBC: x / WBC x   Sq Epi: x / Non Sq Epi: x / Bacteria: x        Culture - Blood (collected 27 Dec 2023 17:30)  Source: .Blood Blood-Peripheral  Preliminary Report (29 Dec 2023 22:02):    No growth at 48 Hours    Culture - Blood (collected 27 Dec 2023 17:20)  Source: .Blood Blood-Peripheral  Preliminary Report (29 Dec 2023 22:02):    No growth at 48 Hours        RADIOLOGY & ADDITIONAL TESTS:  Results Reviewed:   Imaging Personally Reviewed:  Electrocardiogram Personally Reviewed:    COORDINATION OF CARE:  Care Discussed with Consultants/Other Providers [Y/N]:  Prior or Outpatient Records Reviewed [Y/N]:

## 2023-12-31 ENCOUNTER — TRANSCRIPTION ENCOUNTER (OUTPATIENT)
Age: 68
End: 2023-12-31

## 2023-12-31 LAB
ALBUMIN SERPL ELPH-MCNC: 3.7 G/DL — SIGNIFICANT CHANGE UP (ref 3.3–5)
ALBUMIN SERPL ELPH-MCNC: 3.7 G/DL — SIGNIFICANT CHANGE UP (ref 3.3–5)
ALP SERPL-CCNC: 99 U/L — SIGNIFICANT CHANGE UP (ref 40–120)
ALP SERPL-CCNC: 99 U/L — SIGNIFICANT CHANGE UP (ref 40–120)
ALT FLD-CCNC: 10 U/L — SIGNIFICANT CHANGE UP (ref 10–45)
ALT FLD-CCNC: 10 U/L — SIGNIFICANT CHANGE UP (ref 10–45)
ANION GAP SERPL CALC-SCNC: 11 MMOL/L — SIGNIFICANT CHANGE UP (ref 5–17)
ANION GAP SERPL CALC-SCNC: 11 MMOL/L — SIGNIFICANT CHANGE UP (ref 5–17)
AST SERPL-CCNC: 13 U/L — SIGNIFICANT CHANGE UP (ref 10–40)
AST SERPL-CCNC: 13 U/L — SIGNIFICANT CHANGE UP (ref 10–40)
BILIRUB SERPL-MCNC: 0.4 MG/DL — SIGNIFICANT CHANGE UP (ref 0.2–1.2)
BILIRUB SERPL-MCNC: 0.4 MG/DL — SIGNIFICANT CHANGE UP (ref 0.2–1.2)
BUN SERPL-MCNC: 20 MG/DL — SIGNIFICANT CHANGE UP (ref 7–23)
BUN SERPL-MCNC: 20 MG/DL — SIGNIFICANT CHANGE UP (ref 7–23)
CALCIUM SERPL-MCNC: 9.5 MG/DL — SIGNIFICANT CHANGE UP (ref 8.4–10.5)
CALCIUM SERPL-MCNC: 9.5 MG/DL — SIGNIFICANT CHANGE UP (ref 8.4–10.5)
CHLORIDE SERPL-SCNC: 106 MMOL/L — SIGNIFICANT CHANGE UP (ref 96–108)
CHLORIDE SERPL-SCNC: 106 MMOL/L — SIGNIFICANT CHANGE UP (ref 96–108)
CO2 SERPL-SCNC: 21 MMOL/L — LOW (ref 22–31)
CO2 SERPL-SCNC: 21 MMOL/L — LOW (ref 22–31)
CREAT SERPL-MCNC: 1.04 MG/DL — SIGNIFICANT CHANGE UP (ref 0.5–1.3)
CREAT SERPL-MCNC: 1.04 MG/DL — SIGNIFICANT CHANGE UP (ref 0.5–1.3)
EGFR: 79 ML/MIN/1.73M2 — SIGNIFICANT CHANGE UP
EGFR: 79 ML/MIN/1.73M2 — SIGNIFICANT CHANGE UP
GLUCOSE SERPL-MCNC: 122 MG/DL — HIGH (ref 70–99)
GLUCOSE SERPL-MCNC: 122 MG/DL — HIGH (ref 70–99)
HCT VFR BLD CALC: 42.3 % — SIGNIFICANT CHANGE UP (ref 39–50)
HCT VFR BLD CALC: 42.3 % — SIGNIFICANT CHANGE UP (ref 39–50)
HGB BLD-MCNC: 14.2 G/DL — SIGNIFICANT CHANGE UP (ref 13–17)
HGB BLD-MCNC: 14.2 G/DL — SIGNIFICANT CHANGE UP (ref 13–17)
MCHC RBC-ENTMCNC: 28.3 PG — SIGNIFICANT CHANGE UP (ref 27–34)
MCHC RBC-ENTMCNC: 28.3 PG — SIGNIFICANT CHANGE UP (ref 27–34)
MCHC RBC-ENTMCNC: 33.6 GM/DL — SIGNIFICANT CHANGE UP (ref 32–36)
MCHC RBC-ENTMCNC: 33.6 GM/DL — SIGNIFICANT CHANGE UP (ref 32–36)
MCV RBC AUTO: 84.4 FL — SIGNIFICANT CHANGE UP (ref 80–100)
MCV RBC AUTO: 84.4 FL — SIGNIFICANT CHANGE UP (ref 80–100)
NRBC # BLD: 0 /100 WBCS — SIGNIFICANT CHANGE UP (ref 0–0)
NRBC # BLD: 0 /100 WBCS — SIGNIFICANT CHANGE UP (ref 0–0)
PLATELET # BLD AUTO: 232 K/UL — SIGNIFICANT CHANGE UP (ref 150–400)
PLATELET # BLD AUTO: 232 K/UL — SIGNIFICANT CHANGE UP (ref 150–400)
POTASSIUM SERPL-MCNC: 4.6 MMOL/L — SIGNIFICANT CHANGE UP (ref 3.5–5.3)
POTASSIUM SERPL-MCNC: 4.6 MMOL/L — SIGNIFICANT CHANGE UP (ref 3.5–5.3)
POTASSIUM SERPL-SCNC: 4.6 MMOL/L — SIGNIFICANT CHANGE UP (ref 3.5–5.3)
POTASSIUM SERPL-SCNC: 4.6 MMOL/L — SIGNIFICANT CHANGE UP (ref 3.5–5.3)
PROT SERPL-MCNC: 6.5 G/DL — SIGNIFICANT CHANGE UP (ref 6–8.3)
PROT SERPL-MCNC: 6.5 G/DL — SIGNIFICANT CHANGE UP (ref 6–8.3)
RBC # BLD: 5.01 M/UL — SIGNIFICANT CHANGE UP (ref 4.2–5.8)
RBC # BLD: 5.01 M/UL — SIGNIFICANT CHANGE UP (ref 4.2–5.8)
RBC # FLD: 13 % — SIGNIFICANT CHANGE UP (ref 10.3–14.5)
RBC # FLD: 13 % — SIGNIFICANT CHANGE UP (ref 10.3–14.5)
SODIUM SERPL-SCNC: 138 MMOL/L — SIGNIFICANT CHANGE UP (ref 135–145)
SODIUM SERPL-SCNC: 138 MMOL/L — SIGNIFICANT CHANGE UP (ref 135–145)
TACROLIMUS SERPL-MCNC: 9.4 NG/ML — SIGNIFICANT CHANGE UP
TACROLIMUS SERPL-MCNC: 9.4 NG/ML — SIGNIFICANT CHANGE UP
WBC # BLD: 12.97 K/UL — HIGH (ref 3.8–10.5)
WBC # BLD: 12.97 K/UL — HIGH (ref 3.8–10.5)
WBC # FLD AUTO: 12.97 K/UL — HIGH (ref 3.8–10.5)
WBC # FLD AUTO: 12.97 K/UL — HIGH (ref 3.8–10.5)

## 2023-12-31 PROCEDURE — 99233 SBSQ HOSP IP/OBS HIGH 50: CPT

## 2023-12-31 RX ADMIN — Medication 600 MILLIGRAM(S): at 17:24

## 2023-12-31 RX ADMIN — Medication 100 MILLIGRAM(S): at 13:01

## 2023-12-31 RX ADMIN — REMDESIVIR 200 MILLIGRAM(S): 5 INJECTION INTRAVENOUS at 10:39

## 2023-12-31 RX ADMIN — MAGNESIUM OXIDE 400 MG ORAL TABLET 400 MILLIGRAM(S): 241.3 TABLET ORAL at 12:59

## 2023-12-31 RX ADMIN — Medication 650 MILLIGRAM(S): at 17:24

## 2023-12-31 RX ADMIN — Medication 650 MILLIGRAM(S): at 18:25

## 2023-12-31 RX ADMIN — Medication 30 MILLIGRAM(S): at 08:18

## 2023-12-31 RX ADMIN — ATORVASTATIN CALCIUM 20 MILLIGRAM(S): 80 TABLET, FILM COATED ORAL at 21:01

## 2023-12-31 RX ADMIN — Medication 650 MILLIGRAM(S): at 07:53

## 2023-12-31 RX ADMIN — Medication 10 MILLIGRAM(S): at 17:25

## 2023-12-31 RX ADMIN — CHLORHEXIDINE GLUCONATE 1 APPLICATION(S): 213 SOLUTION TOPICAL at 12:49

## 2023-12-31 RX ADMIN — Medication 10 MILLIGRAM(S): at 05:14

## 2023-12-31 RX ADMIN — Medication 1 SPRAY(S): at 17:25

## 2023-12-31 RX ADMIN — Medication 600 MILLIGRAM(S): at 05:15

## 2023-12-31 RX ADMIN — Medication 100 MILLIGRAM(S): at 21:01

## 2023-12-31 RX ADMIN — Medication 1 TABLET(S): at 12:59

## 2023-12-31 RX ADMIN — Medication 100 MILLIGRAM(S): at 05:15

## 2023-12-31 RX ADMIN — TACROLIMUS 2.75 MILLIGRAM(S): 5 CAPSULE ORAL at 08:19

## 2023-12-31 RX ADMIN — Medication 81 MILLIGRAM(S): at 12:59

## 2023-12-31 RX ADMIN — ENOXAPARIN SODIUM 40 MILLIGRAM(S): 100 INJECTION SUBCUTANEOUS at 05:14

## 2023-12-31 RX ADMIN — Medication 650 MILLIGRAM(S): at 08:05

## 2023-12-31 RX ADMIN — Medication 1 SPRAY(S): at 05:15

## 2023-12-31 NOTE — PROGRESS NOTE ADULT - SUBJECTIVE AND OBJECTIVE BOX
Patient is a 68y old  Male who presents with a chief complaint of COVID-19 (31 Dec 2023 04:04)      SUBJECTIVE / OVERNIGHT EVENTS: Patient seen and examined at bedside. No acute events overnight. Some mild nasal congestion. Cough improving. No SOB.    MEDICATIONS  (STANDING):  aspirin enteric coated 81 milliGRAM(s) Oral daily  atorvastatin 20 milliGRAM(s) Oral at bedtime  benzonatate 100 milliGRAM(s) Oral every 8 hours  chlorhexidine 2% Cloths 1 Application(s) Topical daily  enoxaparin Injectable 40 milliGRAM(s) SubCutaneous every 24 hours  fluticasone propionate 50 MICROgram(s)/spray Nasal Spray 1 Spray(s) Both Nostrils two times a day  guaiFENesin  milliGRAM(s) Oral every 12 hours  influenza  Vaccine (HIGH DOSE) 0.7 milliLiter(s) IntraMuscular once  magnesium oxide 400 milliGRAM(s) Oral daily  multivitamin 1 Tablet(s) Oral daily  NIFEdipine XL 30 milliGRAM(s) Oral every 24 hours  remdesivir  IVPB 100 milliGRAM(s) IV Intermittent every 24 hours  remdesivir  IVPB   IV Intermittent   senna 1 Tablet(s) Oral at bedtime  sildenafil (REVATIO) 10 milliGRAM(s) Oral two times a day  tacrolimus ER Tablet (ENVARSUS XR) 2.75 milliGRAM(s) Oral <User Schedule>    MEDICATIONS  (PRN):  acetaminophen     Tablet .. 650 milliGRAM(s) Oral every 6 hours PRN Temp greater or equal to 38C (100.4F), Mild Pain (1 - 3), Moderate Pain (4 - 6), Severe Pain (7 - 10)  benzocaine/menthol Lozenge 1 Lozenge Oral every 2 hours PRN Sore Throat  sodium chloride 0.65% Nasal 1 Spray(s) Both Nostrils four times a day PRN Nasal Congestion      CAPILLARY BLOOD GLUCOSE        I&O's Summary    30 Dec 2023 07:01  -  31 Dec 2023 07:00  --------------------------------------------------------  IN: 960 mL / OUT: 200 mL / NET: 760 mL        PHYSICAL EXAM:  Vital Signs Last 24 Hrs  T(C): 36.8 (31 Dec 2023 08:10), Max: 37 (30 Dec 2023 20:39)  T(F): 98.2 (31 Dec 2023 08:10), Max: 98.6 (30 Dec 2023 20:39)  HR: 105 (31 Dec 2023 08:10) (103 - 108)  BP: 139/90 (31 Dec 2023 08:10) (119/79 - 144/92)  BP(mean): --  RR: 18 (31 Dec 2023 08:10) (16 - 18)  SpO2: 97% (31 Dec 2023 08:10) (95% - 97%)    Parameters below as of 31 Dec 2023 08:10  Patient On (Oxygen Delivery Method): room air        GEN: male in NAD, appears comfortable, no diaphoresis  EYES: No scleral injection, EOMI  ENTM: neck supple & symmetric without tracheal deviation, moist membranes, no gross hearing impairment, thyroid gland not enlarged  CV: +S1/S2, no m/r/g, no abdominal bruit, no LE edema  RESP: breathing comfortably, no respiratory accessory muscle use, CTAB, no w/r/r  GI: normoactive BS, soft, NTND, no rebounding/guarding, no palpable masses    LABS:                        14.2   12.97 )-----------( 232      ( 31 Dec 2023 06:33 )             42.3     12-31    138  |  106  |  20  ----------------------------<  122<H>  4.6   |  21<L>  |  1.04    Ca    9.5      31 Dec 2023 06:33    TPro  6.5  /  Alb  3.7  /  TBili  0.4  /  DBili  x   /  AST  13  /  ALT  10  /  AlkPhos  99  12-31          Urinalysis Basic - ( 31 Dec 2023 06:33 )    Color: x / Appearance: x / SG: x / pH: x  Gluc: 122 mg/dL / Ketone: x  / Bili: x / Urobili: x   Blood: x / Protein: x / Nitrite: x   Leuk Esterase: x / RBC: x / WBC x   Sq Epi: x / Non Sq Epi: x / Bacteria: x          RADIOLOGY & ADDITIONAL TESTS:  Results Reviewed:   Imaging Personally Reviewed:  Electrocardiogram Personally Reviewed:    COORDINATION OF CARE:  Care Discussed with Consultants/Other Providers [Y/N]:  Prior or Outpatient Records Reviewed [Y/N]:

## 2024-01-01 LAB
ALBUMIN SERPL ELPH-MCNC: 4 G/DL — SIGNIFICANT CHANGE UP (ref 3.3–5)
ALBUMIN SERPL ELPH-MCNC: 4 G/DL — SIGNIFICANT CHANGE UP (ref 3.3–5)
ALP SERPL-CCNC: 109 U/L — SIGNIFICANT CHANGE UP (ref 40–120)
ALP SERPL-CCNC: 109 U/L — SIGNIFICANT CHANGE UP (ref 40–120)
ALT FLD-CCNC: 12 U/L — SIGNIFICANT CHANGE UP (ref 10–45)
ALT FLD-CCNC: 12 U/L — SIGNIFICANT CHANGE UP (ref 10–45)
ANION GAP SERPL CALC-SCNC: 11 MMOL/L — SIGNIFICANT CHANGE UP (ref 5–17)
ANION GAP SERPL CALC-SCNC: 11 MMOL/L — SIGNIFICANT CHANGE UP (ref 5–17)
AST SERPL-CCNC: 15 U/L — SIGNIFICANT CHANGE UP (ref 10–40)
AST SERPL-CCNC: 15 U/L — SIGNIFICANT CHANGE UP (ref 10–40)
BILIRUB SERPL-MCNC: 0.2 MG/DL — SIGNIFICANT CHANGE UP (ref 0.2–1.2)
BILIRUB SERPL-MCNC: 0.2 MG/DL — SIGNIFICANT CHANGE UP (ref 0.2–1.2)
BUN SERPL-MCNC: 20 MG/DL — SIGNIFICANT CHANGE UP (ref 7–23)
BUN SERPL-MCNC: 20 MG/DL — SIGNIFICANT CHANGE UP (ref 7–23)
CALCIUM SERPL-MCNC: 9.7 MG/DL — SIGNIFICANT CHANGE UP (ref 8.4–10.5)
CALCIUM SERPL-MCNC: 9.7 MG/DL — SIGNIFICANT CHANGE UP (ref 8.4–10.5)
CHLORIDE SERPL-SCNC: 102 MMOL/L — SIGNIFICANT CHANGE UP (ref 96–108)
CHLORIDE SERPL-SCNC: 102 MMOL/L — SIGNIFICANT CHANGE UP (ref 96–108)
CO2 SERPL-SCNC: 25 MMOL/L — SIGNIFICANT CHANGE UP (ref 22–31)
CO2 SERPL-SCNC: 25 MMOL/L — SIGNIFICANT CHANGE UP (ref 22–31)
CREAT SERPL-MCNC: 1.06 MG/DL — SIGNIFICANT CHANGE UP (ref 0.5–1.3)
CREAT SERPL-MCNC: 1.06 MG/DL — SIGNIFICANT CHANGE UP (ref 0.5–1.3)
CULTURE RESULTS: SIGNIFICANT CHANGE UP
EGFR: 77 ML/MIN/1.73M2 — SIGNIFICANT CHANGE UP
EGFR: 77 ML/MIN/1.73M2 — SIGNIFICANT CHANGE UP
GLUCOSE SERPL-MCNC: 117 MG/DL — HIGH (ref 70–99)
GLUCOSE SERPL-MCNC: 117 MG/DL — HIGH (ref 70–99)
HCT VFR BLD CALC: 45.9 % — SIGNIFICANT CHANGE UP (ref 39–50)
HCT VFR BLD CALC: 45.9 % — SIGNIFICANT CHANGE UP (ref 39–50)
HGB BLD-MCNC: 15.3 G/DL — SIGNIFICANT CHANGE UP (ref 13–17)
HGB BLD-MCNC: 15.3 G/DL — SIGNIFICANT CHANGE UP (ref 13–17)
MCHC RBC-ENTMCNC: 28.2 PG — SIGNIFICANT CHANGE UP (ref 27–34)
MCHC RBC-ENTMCNC: 28.2 PG — SIGNIFICANT CHANGE UP (ref 27–34)
MCHC RBC-ENTMCNC: 33.3 GM/DL — SIGNIFICANT CHANGE UP (ref 32–36)
MCHC RBC-ENTMCNC: 33.3 GM/DL — SIGNIFICANT CHANGE UP (ref 32–36)
MCV RBC AUTO: 84.5 FL — SIGNIFICANT CHANGE UP (ref 80–100)
MCV RBC AUTO: 84.5 FL — SIGNIFICANT CHANGE UP (ref 80–100)
NRBC # BLD: 0 /100 WBCS — SIGNIFICANT CHANGE UP (ref 0–0)
NRBC # BLD: 0 /100 WBCS — SIGNIFICANT CHANGE UP (ref 0–0)
PLATELET # BLD AUTO: 278 K/UL — SIGNIFICANT CHANGE UP (ref 150–400)
PLATELET # BLD AUTO: 278 K/UL — SIGNIFICANT CHANGE UP (ref 150–400)
POTASSIUM SERPL-MCNC: 4.5 MMOL/L — SIGNIFICANT CHANGE UP (ref 3.5–5.3)
POTASSIUM SERPL-MCNC: 4.5 MMOL/L — SIGNIFICANT CHANGE UP (ref 3.5–5.3)
POTASSIUM SERPL-SCNC: 4.5 MMOL/L — SIGNIFICANT CHANGE UP (ref 3.5–5.3)
POTASSIUM SERPL-SCNC: 4.5 MMOL/L — SIGNIFICANT CHANGE UP (ref 3.5–5.3)
PROT SERPL-MCNC: 7 G/DL — SIGNIFICANT CHANGE UP (ref 6–8.3)
PROT SERPL-MCNC: 7 G/DL — SIGNIFICANT CHANGE UP (ref 6–8.3)
RBC # BLD: 5.43 M/UL — SIGNIFICANT CHANGE UP (ref 4.2–5.8)
RBC # BLD: 5.43 M/UL — SIGNIFICANT CHANGE UP (ref 4.2–5.8)
RBC # FLD: 12.9 % — SIGNIFICANT CHANGE UP (ref 10.3–14.5)
RBC # FLD: 12.9 % — SIGNIFICANT CHANGE UP (ref 10.3–14.5)
SODIUM SERPL-SCNC: 138 MMOL/L — SIGNIFICANT CHANGE UP (ref 135–145)
SODIUM SERPL-SCNC: 138 MMOL/L — SIGNIFICANT CHANGE UP (ref 135–145)
SPECIMEN SOURCE: SIGNIFICANT CHANGE UP
TACROLIMUS SERPL-MCNC: 11.5 NG/ML — SIGNIFICANT CHANGE UP
TACROLIMUS SERPL-MCNC: 11.5 NG/ML — SIGNIFICANT CHANGE UP
WBC # BLD: 12.96 K/UL — HIGH (ref 3.8–10.5)
WBC # BLD: 12.96 K/UL — HIGH (ref 3.8–10.5)
WBC # FLD AUTO: 12.96 K/UL — HIGH (ref 3.8–10.5)
WBC # FLD AUTO: 12.96 K/UL — HIGH (ref 3.8–10.5)

## 2024-01-01 PROCEDURE — 99232 SBSQ HOSP IP/OBS MODERATE 35: CPT

## 2024-01-01 RX ORDER — TACROLIMUS 5 MG/1
2 CAPSULE ORAL
Refills: 0 | Status: DISCONTINUED | OUTPATIENT
Start: 2024-01-02 | End: 2024-01-02

## 2024-01-01 RX ADMIN — Medication 100 MILLIGRAM(S): at 05:31

## 2024-01-01 RX ADMIN — Medication 10 MILLIGRAM(S): at 05:32

## 2024-01-01 RX ADMIN — TACROLIMUS 2.75 MILLIGRAM(S): 5 CAPSULE ORAL at 08:40

## 2024-01-01 RX ADMIN — Medication 10 MILLIGRAM(S): at 17:09

## 2024-01-01 RX ADMIN — Medication 81 MILLIGRAM(S): at 11:13

## 2024-01-01 RX ADMIN — Medication 600 MILLIGRAM(S): at 17:08

## 2024-01-01 RX ADMIN — ATORVASTATIN CALCIUM 20 MILLIGRAM(S): 80 TABLET, FILM COATED ORAL at 22:09

## 2024-01-01 RX ADMIN — CHLORHEXIDINE GLUCONATE 1 APPLICATION(S): 213 SOLUTION TOPICAL at 11:36

## 2024-01-01 RX ADMIN — Medication 1 SPRAY(S): at 17:09

## 2024-01-01 RX ADMIN — Medication 600 MILLIGRAM(S): at 05:32

## 2024-01-01 RX ADMIN — Medication 1 SPRAY(S): at 05:32

## 2024-01-01 RX ADMIN — Medication 100 MILLIGRAM(S): at 13:44

## 2024-01-01 RX ADMIN — MAGNESIUM OXIDE 400 MG ORAL TABLET 400 MILLIGRAM(S): 241.3 TABLET ORAL at 11:13

## 2024-01-01 RX ADMIN — Medication 100 MILLIGRAM(S): at 22:09

## 2024-01-01 RX ADMIN — REMDESIVIR 200 MILLIGRAM(S): 5 INJECTION INTRAVENOUS at 11:13

## 2024-01-01 RX ADMIN — Medication 1 TABLET(S): at 11:13

## 2024-01-01 RX ADMIN — BENZOCAINE AND MENTHOL 1 LOZENGE: 5; 1 LIQUID ORAL at 08:40

## 2024-01-01 RX ADMIN — Medication 30 MILLIGRAM(S): at 08:39

## 2024-01-01 RX ADMIN — ENOXAPARIN SODIUM 40 MILLIGRAM(S): 100 INJECTION SUBCUTANEOUS at 05:32

## 2024-01-01 NOTE — DISCHARGE NOTE PROVIDER - NSDCFUADDAPPT_GEN_ALL_CORE_FT
You have a heart failure appointment at [address] [facility] scheduled for 1/8/24 at 10:30AM.     APPTS ARE READY TO BE MADE: [x ] YES    Best Family or Patient Contact (if needed):    Additional Information about above appointments (if needed):    1: Primary care in 1 week   2: HF scheduled for 1/8  3:     Other comments or requests:    You have a heart failure appointment at [facility] scheduled for 1/8/24 at 10:30AM.     APPTS ARE READY TO BE MADE: [x ] YES    Best Family or Patient Contact (if needed):    Additional Information about above appointments (if needed):    1: Primary care in 1 week   2: HF scheduled for 1/8  3:     Other comments or requests:    [facility] for 1/8/24 at 10:30AM.     APPTS ARE READY TO BE MADE: [x ] YES    Best Family or Patient Contact (if needed):    Additional Information about above appointments (if needed):    1: Primary care in 1 week   2: HF scheduled for 1/8  3:     Other comments or requests:       Patient was previously scheduled for an appointment on 1/8 at 10:30 am at [facility]  with MITRA Lugo  Patient was previously scheduled for an appointment on 1/12 at 10:20 am at [address] [place] [address] with Dr. Ferrara     You have a heart failure appointment at [facility] scheduled for 1/8/24 at 10:30AM.     APPTS ARE READY TO BE MADE: [x ] YES    Best Family or Patient Contact (if needed):    Additional Information about above appointments (if needed):    1: Primary care in 1 week   2: HF scheduled for 1/8  3:     Other comments or requests:       Patient was previously scheduled for an appointment on 1/8 at 10:30 am at 300 [facility]  with MITRA Lugo  Patient was previously scheduled for an appointment on 1/12 at 10:20 am at [address] [place] [address] with Dr. Ferrara

## 2024-01-01 NOTE — DISCHARGE NOTE PROVIDER - NSDCMRMEDTOKEN_GEN_ALL_CORE_FT
Aspirin Enteric Coated 81 mg oral delayed release tablet: 1 tab(s) orally once a day  CellCept 250 mg oral capsule: 2 cap(s) orally 2 times a day  Envarsus XR 1 mg oral tablet, extended release: 3 tab(s) orally once a day  Farxiga 5 mg oral tablet: 1 tab(s) orally as needed  magnesium oxide 400 mg oral tablet: 1 tab(s) orally once a day  Multiple Vitamins oral tablet: 1 tab(s) orally once a day  NIFEdipine 30 mg oral tablet, extended release: 1 tab(s) orally once a day  rosuvastatin 5 mg oral tablet: 1 tab(s) orally once a day  sildenafil 20 mg oral tablet: 0.5 tab(s) orally 2 times a day   Aspirin Enteric Coated 81 mg oral delayed release tablet: 1 tab(s) orally once a day  magnesium oxide 400 mg oral tablet: 1 tab(s) orally once a day  Multiple Vitamins oral tablet: 1 tab(s) orally once a day  NIFEdipine 30 mg oral tablet, extended release: 1 tab(s) orally once a day  rosuvastatin 5 mg oral tablet: 1 tab(s) orally once a day  sildenafil 20 mg oral tablet: 0.5 tab(s) orally 2 times a day  tacrolimus 1 mg oral tablet, extended release: 2 tab(s) orally once a day at 8AM

## 2024-01-01 NOTE — DISCHARGE NOTE PROVIDER - NSDCCPCAREPLAN_GEN_ALL_CORE_FT
PRINCIPAL DISCHARGE DIAGNOSIS  Diagnosis: 2019 novel coronavirus disease (COVID-19)  Assessment and Plan of Treatment: You finished Remdesivir. Please follow up with your cardiologist to manage your immunosuppression      SECONDARY DISCHARGE DIAGNOSES  Diagnosis: History of heart transplant  Assessment and Plan of Treatment: Follow up with your cardiologist.    Diagnosis: Stage 3 chronic kidney disease  Assessment and Plan of Treatment: Outpatient follow up     PRINCIPAL DISCHARGE DIAGNOSIS  Diagnosis: 2019 novel coronavirus disease (COVID-19)  Assessment and Plan of Treatment: You finished Remdesivir. Please follow up with your cardiologist to manage your immunosuppression      SECONDARY DISCHARGE DIAGNOSES  Diagnosis: History of heart transplant  Assessment and Plan of Treatment: You have a heart failure appointment at [address] [place] [address] scheduled for 1/8/24 at 10:30AM.    Diagnosis: Stage 3 chronic kidney disease  Assessment and Plan of Treatment: Outpatient follow up     PRINCIPAL DISCHARGE DIAGNOSIS  Diagnosis: 2019 novel coronavirus disease (COVID-19)  Assessment and Plan of Treatment: You finished Remdesivir. Please follow up with your cardiologist to manage your immunosuppression      SECONDARY DISCHARGE DIAGNOSES  Diagnosis: History of heart transplant  Assessment and Plan of Treatment: You have a heart failure appointment at [address] [place] [address] scheduled for 1/8/24 at 10:30AM.    Diagnosis: Stage 3 chronic kidney disease  Assessment and Plan of Treatment: Outpatient follow up

## 2024-01-01 NOTE — DISCHARGE NOTE PROVIDER - CARE PROVIDER_API CALL
negative Ira Ferrara  Internal Medicine  [address] [facility], [facility] [address]  [place] [address]  Phone: [phone]  Fax: [phone]  Follow Up Time: 1 week   Ira Ferrara  Internal Medicine  [address] [facility], [facility] [address]  [place] [address]  Phone: [phone]  Fax: [phone]  Follow Up Time: 1 week

## 2024-01-01 NOTE — DISCHARGE NOTE PROVIDER - PROVIDER TOKENS
PROVIDER:[TOKEN:[[id number]],FOLLOWUP:[1 week]] PROVIDER:[TOKEN:[[id number]],FOLLOWUP:[1 week]]

## 2024-01-01 NOTE — DISCHARGE NOTE PROVIDER - ATTENDING DISCHARGE PHYSICAL EXAMINATION:
T(C): 36.8 (01-01-24 @ 08:30), Max: 36.9 (12-31-23 @ 12:55)  HR: 106 (01-01-24 @ 08:30) (98 - 106)  BP: 105/75 (01-01-24 @ 08:30) (105/75 - 111/75)  RR: 18 (01-01-24 @ 08:30) (16 - 18)  SpO2: 94% (01-01-24 @ 08:30) (94% - 97%)    GEN: male in NAD, appears comfortable, no diaphoresis  EYES: No scleral injection, PERRL, EOMI  ENTM: neck supple & symmetric without tracheal deviation, moist membranes, no gross hearing impairment, thyroid gland not enlarged  CV: +S1/S2, no m/r/g, no abdominal bruit, no LE edema  RESP: breathing comfortably, no respiratory accessory muscle use, CTAB, no w/r/r  GI: normoactive BS, soft, NTND, no rebounding/guarding, no palpable masses  LYMPHATICS: no LAD or tenderness to palpation  NEURO: AOx3, no focal deficits, CNII-XII grossly intact  PSYCH: No SI/HI/AVH, appropriate affect, appropriate insight/judgment   SKIN: no petechiae, ecchymosis or maculopapular rash noted

## 2024-01-01 NOTE — DISCHARGE NOTE PROVIDER - NSDCFUSCHEDAPPT_GEN_ALL_CORE_FT
[facility]  ECHOCARD [address]  Scheduled Appointment: 01/08/2024    [facility]  HEARTFAIL [address]  Scheduled Appointment: 01/08/2024    Ira Medina  [facility]  INTMED  [facility]   Scheduled Appointment: 01/12/2024    Anahi Chambers  [facility]  ENDOCRIN [address] [facility]  Scheduled Appointment: 02/09/2024     [facility]  ECHOCARD [address]  Scheduled Appointment: 01/08/2024    [facility]  HEARTFAIL [address]  Scheduled Appointment: 01/08/2024    Ira Medina  [facility]  INTMED  [facility]   Scheduled Appointment: 01/12/2024    Anahi Chambers  [facility]  ENDOCRIN [address] [facility]  Scheduled Appointment: 02/09/2024     [facility]  ECHOCARD [address]  Scheduled Appointment: 01/08/2024    Ira Medina  [facility]  [facility] CDS  Scheduled Appointment: 01/08/2024    [facility]  HEARTFAIL [address]  Scheduled Appointment: 01/08/2024    Ira Medina  [facility] Physician [facility]  INTMED  [facility]   Scheduled Appointment: 01/12/2024    Anahi Chambers  [facility] Physician [facility]  ENDOCRIN [address] [facility]  Scheduled Appointment: 02/09/2024     [facility]  ECHOCARD [address]  Scheduled Appointment: 01/08/2024    Ira Medina  [facility]  [facility] CDS  Scheduled Appointment: 01/08/2024    [facility]  HEARTFAIL [address]  Scheduled Appointment: 01/08/2024    Ira Medina  [facility] Physician [facility]  INTMED  [facility]   Scheduled Appointment: 01/12/2024    Anahi Chambers  [facility] Physician [facility]  ENDOCRIN [address] [facility]  Scheduled Appointment: 02/09/2024

## 2024-01-01 NOTE — PROGRESS NOTE ADULT - SUBJECTIVE AND OBJECTIVE BOX
Patient is a 68y old  Male who presents with a chief complaint of COVID-19 (01 Jan 2024 04:43)      SUBJECTIVE / OVERNIGHT EVENTS: Patient seen and examined at bedside. No acute events overnight. Discussed with HF attending, want to keep him today to determine immunosuppression regimen on discharge. Cough and nasal congestion improving.    MEDICATIONS  (STANDING):  aspirin enteric coated 81 milliGRAM(s) Oral daily  atorvastatin 20 milliGRAM(s) Oral at bedtime  benzonatate 100 milliGRAM(s) Oral every 8 hours  chlorhexidine 2% Cloths 1 Application(s) Topical daily  enoxaparin Injectable 40 milliGRAM(s) SubCutaneous every 24 hours  fluticasone propionate 50 MICROgram(s)/spray Nasal Spray 1 Spray(s) Both Nostrils two times a day  guaiFENesin  milliGRAM(s) Oral every 12 hours  influenza  Vaccine (HIGH DOSE) 0.7 milliLiter(s) IntraMuscular once  magnesium oxide 400 milliGRAM(s) Oral daily  multivitamin 1 Tablet(s) Oral daily  NIFEdipine XL 30 milliGRAM(s) Oral every 24 hours  senna 1 Tablet(s) Oral at bedtime  sildenafil (REVATIO) 10 milliGRAM(s) Oral two times a day    MEDICATIONS  (PRN):  acetaminophen     Tablet .. 650 milliGRAM(s) Oral every 6 hours PRN Temp greater or equal to 38C (100.4F), Mild Pain (1 - 3), Moderate Pain (4 - 6), Severe Pain (7 - 10)  benzocaine/menthol Lozenge 1 Lozenge Oral every 2 hours PRN Sore Throat  sodium chloride 0.65% Nasal 1 Spray(s) Both Nostrils four times a day PRN Nasal Congestion      CAPILLARY BLOOD GLUCOSE        I&O's Summary    31 Dec 2023 07:01  -  01 Jan 2024 07:00  --------------------------------------------------------  IN: 1020 mL / OUT: 0 mL / NET: 1020 mL    01 Jan 2024 07:01  -  01 Jan 2024 13:14  --------------------------------------------------------  IN: 240 mL / OUT: 0 mL / NET: 240 mL        PHYSICAL EXAM:  Vital Signs Last 24 Hrs  T(C): 36.8 (01 Jan 2024 08:30), Max: 36.8 (31 Dec 2023 21:01)  T(F): 98.2 (01 Jan 2024 08:30), Max: 98.3 (31 Dec 2023 21:01)  HR: 106 (01 Jan 2024 08:30) (102 - 106)  BP: 105/75 (01 Jan 2024 08:30) (105/75 - 109/69)  BP(mean): --  RR: 18 (01 Jan 2024 08:30) (16 - 18)  SpO2: 94% (01 Jan 2024 08:30) (94% - 97%)    Parameters below as of 01 Jan 2024 08:30  Patient On (Oxygen Delivery Method): room air        GEN: male in NAD, appears comfortable, no diaphoresis  EYES: No scleral injection, EOMI  ENTM: neck supple & symmetric without tracheal deviation, moist membranes, no gross hearing impairment, thyroid gland not enlarged  CV: +S1/S2, no m/r/g, no abdominal bruit, no LE edema  RESP: breathing comfortably, no respiratory accessory muscle use, CTAB, no w/r/r  GI: normoactive BS, soft, NTND, no rebounding/guarding, no palpable masses    LABS:                        15.3   12.96 )-----------( 278      ( 01 Jan 2024 06:50 )             45.9     01-01    138  |  102  |  20  ----------------------------<  117<H>  4.5   |  25  |  1.06    Ca    9.7      01 Jan 2024 06:50    TPro  7.0  /  Alb  4.0  /  TBili  0.2  /  DBili  x   /  AST  15  /  ALT  12  /  AlkPhos  109  01-01          Urinalysis Basic - ( 01 Jan 2024 06:50 )    Color: x / Appearance: x / SG: x / pH: x  Gluc: 117 mg/dL / Ketone: x  / Bili: x / Urobili: x   Blood: x / Protein: x / Nitrite: x   Leuk Esterase: x / RBC: x / WBC x   Sq Epi: x / Non Sq Epi: x / Bacteria: x          RADIOLOGY & ADDITIONAL TESTS:  Results Reviewed:   Imaging Personally Reviewed:  Electrocardiogram Personally Reviewed:    COORDINATION OF CARE:  Care Discussed with Consultants/Other Providers [Y/N]:  Prior or Outpatient Records Reviewed [Y/N]:

## 2024-01-01 NOTE — DISCHARGE NOTE PROVIDER - NSDCPNSUBOBJ_GEN_ALL_CORE
Patient seen and examined at bedside. No acute events overnight. No CP/SOB. Cough is improving. Nasal congestion is improving.

## 2024-01-01 NOTE — DISCHARGE NOTE PROVIDER - HOSPITAL COURSE
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    States that his symptoms started on Sunday with slow onset of fever, chills, and a congested nose. He denies travel history but endorses sick contacts with his daughter also testing positive for COVID. He denies headaches, chest pain, palpitations, n/v/c/d. He does have sputum production intermittently with a cough. Otherwise no other complaints.     In ED, vitals with fever to 101.3, tachycardia. Labs significant for leukocytosis, elevated alkp, lactate 2.2 on VBG, RVP+COVID. Received Remdesivir and 250cc fluid bolus.  (28 Dec 2023 03:32)    Hospital Course:  Patient admitted for COVID-19 and received 5 days of Remdesivir. Patient seen by transplant cardiology and transplant ID. He had CT chest which showed non-specific GGO, but likely viral in etiology. Transplant cardiology recommended holding his CellCept and reducing his long acting tacrolimus. Patient treated symptomatically with anti-tussives and flonase. His symptoms slowly improved and he was cleared by transplant ID. He will follow up outpatient with his transplant cardiologist for further management of his immunosuppression.         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    States that his symptoms started on Sunday with slow onset of fever, chills, and a congested nose. He denies travel history but endorses sick contacts with his daughter also testing positive for COVID. He denies headaches, chest pain, palpitations, n/v/c/d. He does have sputum production intermittently with a cough. Otherwise no other complaints.     In ED, vitals with fever to 101.3, tachycardia. Labs significant for leukocytosis, elevated alkp, lactate 2.2 on VBG, RVP+COVID. Received Remdesivir and 250cc fluid bolus.  (28 Dec 2023 03:32)    Hospital Course:  Patient admitted for COVID-19 and received 5 days of Remdesivir. Patient seen by transplant cardiology and transplant ID. He had CT chest which showed non-specific GGO, but likely viral in etiology. Transplant cardiology recommended holding his CellCept and reducing his long acting tacrolimus. Patient treated symptomatically with anti-tussives and flonase. His symptoms slowly improved and he was cleared by transplant ID. He will follow up outpatient with his transplant cardiologist for further management of his immunosuppression.    Medically cleared by Dr. Romano for discharge home.

## 2024-01-02 ENCOUNTER — TRANSCRIPTION ENCOUNTER (OUTPATIENT)
Age: 69
End: 2024-01-02

## 2024-01-02 VITALS — WEIGHT: 139.99 LBS

## 2024-01-02 LAB
ANION GAP SERPL CALC-SCNC: 11 MMOL/L — SIGNIFICANT CHANGE UP (ref 5–17)
ANION GAP SERPL CALC-SCNC: 11 MMOL/L — SIGNIFICANT CHANGE UP (ref 5–17)
BUN SERPL-MCNC: 20 MG/DL — SIGNIFICANT CHANGE UP (ref 7–23)
BUN SERPL-MCNC: 20 MG/DL — SIGNIFICANT CHANGE UP (ref 7–23)
CALCIUM SERPL-MCNC: 9.6 MG/DL — SIGNIFICANT CHANGE UP (ref 8.4–10.5)
CALCIUM SERPL-MCNC: 9.6 MG/DL — SIGNIFICANT CHANGE UP (ref 8.4–10.5)
CHLORIDE SERPL-SCNC: 103 MMOL/L — SIGNIFICANT CHANGE UP (ref 96–108)
CHLORIDE SERPL-SCNC: 103 MMOL/L — SIGNIFICANT CHANGE UP (ref 96–108)
CO2 SERPL-SCNC: 23 MMOL/L — SIGNIFICANT CHANGE UP (ref 22–31)
CO2 SERPL-SCNC: 23 MMOL/L — SIGNIFICANT CHANGE UP (ref 22–31)
CREAT SERPL-MCNC: 0.99 MG/DL — SIGNIFICANT CHANGE UP (ref 0.5–1.3)
CREAT SERPL-MCNC: 0.99 MG/DL — SIGNIFICANT CHANGE UP (ref 0.5–1.3)
EGFR: 84 ML/MIN/1.73M2 — SIGNIFICANT CHANGE UP
EGFR: 84 ML/MIN/1.73M2 — SIGNIFICANT CHANGE UP
GLUCOSE SERPL-MCNC: 121 MG/DL — HIGH (ref 70–99)
GLUCOSE SERPL-MCNC: 121 MG/DL — HIGH (ref 70–99)
MRSA PCR RESULT.: SIGNIFICANT CHANGE UP
MRSA PCR RESULT.: SIGNIFICANT CHANGE UP
POTASSIUM SERPL-MCNC: 4.4 MMOL/L — SIGNIFICANT CHANGE UP (ref 3.5–5.3)
POTASSIUM SERPL-MCNC: 4.4 MMOL/L — SIGNIFICANT CHANGE UP (ref 3.5–5.3)
POTASSIUM SERPL-SCNC: 4.4 MMOL/L — SIGNIFICANT CHANGE UP (ref 3.5–5.3)
POTASSIUM SERPL-SCNC: 4.4 MMOL/L — SIGNIFICANT CHANGE UP (ref 3.5–5.3)
S AUREUS DNA NOSE QL NAA+PROBE: SIGNIFICANT CHANGE UP
S AUREUS DNA NOSE QL NAA+PROBE: SIGNIFICANT CHANGE UP
SODIUM SERPL-SCNC: 137 MMOL/L — SIGNIFICANT CHANGE UP (ref 135–145)
SODIUM SERPL-SCNC: 137 MMOL/L — SIGNIFICANT CHANGE UP (ref 135–145)
TACROLIMUS SERPL-MCNC: 13.7 NG/ML — SIGNIFICANT CHANGE UP
TACROLIMUS SERPL-MCNC: 13.7 NG/ML — SIGNIFICANT CHANGE UP

## 2024-01-02 PROCEDURE — 0225U NFCT DS DNA&RNA 21 SARSCOV2: CPT

## 2024-01-02 PROCEDURE — 87799 DETECT AGENT NOS DNA QUANT: CPT

## 2024-01-02 PROCEDURE — 99285 EMERGENCY DEPT VISIT HI MDM: CPT

## 2024-01-02 PROCEDURE — 82330 ASSAY OF CALCIUM: CPT

## 2024-01-02 PROCEDURE — 83605 ASSAY OF LACTIC ACID: CPT

## 2024-01-02 PROCEDURE — 87640 STAPH A DNA AMP PROBE: CPT

## 2024-01-02 PROCEDURE — 85025 COMPLETE CBC W/AUTO DIFF WBC: CPT

## 2024-01-02 PROCEDURE — 87641 MR-STAPH DNA AMP PROBE: CPT

## 2024-01-02 PROCEDURE — 85027 COMPLETE CBC AUTOMATED: CPT

## 2024-01-02 PROCEDURE — 99239 HOSP IP/OBS DSCHRG MGMT >30: CPT

## 2024-01-02 PROCEDURE — 36415 COLL VENOUS BLD VENIPUNCTURE: CPT

## 2024-01-02 PROCEDURE — 80048 BASIC METABOLIC PNL TOTAL CA: CPT

## 2024-01-02 PROCEDURE — 80197 ASSAY OF TACROLIMUS: CPT

## 2024-01-02 PROCEDURE — 84132 ASSAY OF SERUM POTASSIUM: CPT

## 2024-01-02 PROCEDURE — 85730 THROMBOPLASTIN TIME PARTIAL: CPT

## 2024-01-02 PROCEDURE — 84295 ASSAY OF SERUM SODIUM: CPT

## 2024-01-02 PROCEDURE — 82435 ASSAY OF BLOOD CHLORIDE: CPT

## 2024-01-02 PROCEDURE — 80053 COMPREHEN METABOLIC PANEL: CPT

## 2024-01-02 PROCEDURE — 85014 HEMATOCRIT: CPT

## 2024-01-02 PROCEDURE — 71250 CT THORAX DX C-: CPT

## 2024-01-02 PROCEDURE — 99233 SBSQ HOSP IP/OBS HIGH 50: CPT

## 2024-01-02 PROCEDURE — 85610 PROTHROMBIN TIME: CPT

## 2024-01-02 PROCEDURE — 84484 ASSAY OF TROPONIN QUANT: CPT

## 2024-01-02 PROCEDURE — 82947 ASSAY GLUCOSE BLOOD QUANT: CPT

## 2024-01-02 PROCEDURE — 84145 PROCALCITONIN (PCT): CPT

## 2024-01-02 PROCEDURE — 71045 X-RAY EXAM CHEST 1 VIEW: CPT

## 2024-01-02 PROCEDURE — 85018 HEMOGLOBIN: CPT

## 2024-01-02 PROCEDURE — 87040 BLOOD CULTURE FOR BACTERIA: CPT

## 2024-01-02 PROCEDURE — 94640 AIRWAY INHALATION TREATMENT: CPT

## 2024-01-02 PROCEDURE — 82803 BLOOD GASES ANY COMBINATION: CPT

## 2024-01-02 RX ORDER — TACROLIMUS 5 MG/1
3 CAPSULE ORAL
Refills: 0 | DISCHARGE

## 2024-01-02 RX ORDER — MYCOPHENOLATE MOFETIL 250 MG/1
2 CAPSULE ORAL
Refills: 0 | DISCHARGE

## 2024-01-02 RX ORDER — TACROLIMUS 5 MG/1
2 CAPSULE ORAL
Refills: 0 | Status: DISCONTINUED | OUTPATIENT
Start: 2024-01-02 | End: 2024-01-02

## 2024-01-02 RX ORDER — TACROLIMUS 5 MG/1
2 CAPSULE ORAL
Qty: 0 | Refills: 0 | DISCHARGE
Start: 2024-01-02

## 2024-01-02 RX ORDER — DAPAGLIFLOZIN 10 MG/1
1 TABLET, FILM COATED ORAL
Refills: 0 | DISCHARGE

## 2024-01-02 RX ADMIN — TACROLIMUS 2 MILLIGRAM(S): 5 CAPSULE ORAL at 08:07

## 2024-01-02 RX ADMIN — Medication 100 MILLIGRAM(S): at 06:05

## 2024-01-02 RX ADMIN — Medication 1 TABLET(S): at 11:36

## 2024-01-02 RX ADMIN — Medication 1 SPRAY(S): at 06:05

## 2024-01-02 RX ADMIN — Medication 10 MILLIGRAM(S): at 08:06

## 2024-01-02 RX ADMIN — Medication 30 MILLIGRAM(S): at 08:06

## 2024-01-02 RX ADMIN — Medication 81 MILLIGRAM(S): at 11:36

## 2024-01-02 RX ADMIN — CHLORHEXIDINE GLUCONATE 1 APPLICATION(S): 213 SOLUTION TOPICAL at 11:05

## 2024-01-02 RX ADMIN — Medication 600 MILLIGRAM(S): at 06:05

## 2024-01-02 RX ADMIN — MAGNESIUM OXIDE 400 MG ORAL TABLET 400 MILLIGRAM(S): 241.3 TABLET ORAL at 11:35

## 2024-01-02 RX ADMIN — BENZOCAINE AND MENTHOL 1 LOZENGE: 5; 1 LIQUID ORAL at 12:20

## 2024-01-02 RX ADMIN — ENOXAPARIN SODIUM 40 MILLIGRAM(S): 100 INJECTION SUBCUTANEOUS at 06:04

## 2024-01-02 RX ADMIN — Medication 100 MILLIGRAM(S): at 12:20

## 2024-01-02 NOTE — PROGRESS NOTE ADULT - NS ATTEND AMEND GEN_ALL_CORE FT
68yrs, s/p OHT June 2022.   unremarkable post transplant course.   switched to envarsis for aprox year for stabalization of levels.   managed on sildenafil 10 tid since transplant   No recent rejection.   admitted 12.28 with COVID: fever.  mailaise, not hypoxic.   Received 5 days of rendisovir.   Primary team asking if he can be d/c.   meds; envarsis 2, (13), off cellcept, sildenafil 10 tid, procardia 30, mg, statin, asa,   , 105/75, afebrile  01-02    137  |  103  |  20  ----------------------------<  121<H>  4.4   |  23  |  0.99    Ca    9.6      02 Jan 2024 07:30    TPro  7.0  /  Alb  4.0  /  TBili  0.2  /  DBili  x   /  AST  15  /  ALT  12  /  AlkPhos  109  01-01                        15.3   12.96 )-----------( 278      ( 01 Jan 2024 06:50 )             45.9   Patient stable for d/c.   Discussed with transplant pharmacy: d/c on envarsis 2, off cellcdept.   Repeat prograf level end of week.   Resume cellcept next week  Dany Dominique 68yrs, s/p OHT June 2022.   unremarkable post transplant course.   switched to envarsis for aprox year for stabalization of levels.   managed on sildenafil 10 tid since transplant   No recent rejection.   admitted 12.28 with COVID: fever.  mailaise, not hypoxic.   Received 5 days of rendisovir.   Primary team asking if he can be d/c.   meds; envarsis 2, (13), off cellcept, sildenafil 10 tid, procardia 30, mg, statin, asa,   , 105/75, afebrile  01-02    137  |  103  |  20  ----------------------------<  121<H>  4.4   |  23  |  0.99    Ca    9.6      02 Jan 2024 07:30    TPro  7.0  /  Alb  4.0  /  TBili  0.2  /  DBili  x   /  AST  15  /  ALT  12  /  AlkPhos  109  01-01                        15.3   12.96 )-----------( 278      ( 01 Jan 2024 06:50 )             45.9   Patient stable for d/c.   Discussed with transplant pharmacy: d/c on envarsis 2, off cellcdept.   Repeat prograf level end of week.   Resume cellcept next week  Patient has not received recent vaccinations- should ultimately receive flu and pneumococcal vax is appropriate   Dany Dominique

## 2024-01-02 NOTE — PROGRESS NOTE ADULT - PROBLEM SELECTOR PLAN 3
Hx of OHT in '22.   > home meds: Tacrolimus 3mg daily (8 AM) and Mycophenolate 500mg BID  - Continue with Tacrolimus  - Hold home CellCept given infection  - Monitor tacrolimus level
Hx of OHT in '22.   > home meds: Tacrolimus 3mg daily (8 AM) and Mycophenolate 500mg BID  - Continue with Tacrolimus  - Transplant cardiology consult to see if immunosuppression regimen should be adjusted in acute infection
Hx of OHT in '22.   > home meds: Tacrolimus 3mg daily (8 AM) and Mycophenolate 500mg BID  - Continue with Tacrolimus  - Hold home CellCept given infection  - Monitor tacrolimus level
Hx of OHT in '22.   > home meds: Tacrolimus 3mg daily (8 AM) and Mycophenolate 500mg BID  - Continue with Tacrolimus at reduced dose (2 mg)  - Hold home CellCept given infection  - Monitor tacrolimus level  - Per HF keep until 1/2 to determine immunosuppression on DC
Hx of OHT in '22.   > home meds: Tacrolimus 3mg daily (8 AM) and Mycophenolate 500mg BID  - Continue with Tacrolimus at reduced dose (2.75 mg)  - Hold home CellCept given infection  - Monitor tacrolimus level  - Will discuss with cardiology final DC recs
Hx of OHT in '22.   > home meds: Tacrolimus 3mg daily (8 AM) and Mycophenolate 500mg BID  - Continue with Tacrolimus at reduced dose (2 mg)  - Hold home CellCept given infection  - Monitor tacrolimus level  - Per HF keep until 1/2 to determine immunosuppression on DC

## 2024-01-02 NOTE — DISCHARGE NOTE NURSING/CASE MANAGEMENT/SOCIAL WORK - PATIENT PORTAL LINK FT
You can access the FollowMyHealth Patient Portal offered by [facility] by registering at the following website: [email or web address]. By joining [employer]’s FollowMyHealth portal, you will also be able to view your health information using other applications (apps) compatible with our system. You can access the FollowMyHealth Patient Portal offered by [facility] by registering at the following website: [email or web address]. By joining [employer]’s FollowMyHealth portal, you will also be able to view your health information using other applications (apps) compatible with our system.

## 2024-01-02 NOTE — PROGRESS NOTE ADULT - PROBLEM SELECTOR PROBLEM 3
History of heart transplant

## 2024-01-02 NOTE — PROGRESS NOTE ADULT - PROVIDER SPECIALTY LIST ADULT
Internal Medicine
Transplant ID
Internal Medicine
Transplant Cardiology
Transplant ID
Internal Medicine
Internal Medicine

## 2024-01-02 NOTE — PROGRESS NOTE ADULT - PROBLEM SELECTOR PLAN 4
Baseline SCr at 1.1. On admission at 1.3.   - c/w trend   - avoid nephrotoxins

## 2024-01-02 NOTE — PROGRESS NOTE ADULT - PROBLEM/PLAN-1
Influenza VIS 8/7/15 given today.    1.  Does the patient have a moderate to severe fever?  No  2.  Has the patient had a serious reaction to a flu shot before?   No  3.  Has the patient ever had Guillian Scappoose Syndrome with 6 weeks of a previous flu shot?  No  4.  Does the patient have a serious allergy to eggs?  No  5.  Does the patient have an allergy to latex?   No       Note: This applies to [employer] Fluzone pre-filled syringes only  6.  If person is answering for a child, is the child less that 6 months of age? No    A \"YES\" answer to any question above means the patient is not eligible to receive the vaccine.    
DISPLAY PLAN FREE TEXT

## 2024-01-02 NOTE — DISCHARGE NOTE NURSING/CASE MANAGEMENT/SOCIAL WORK - SOCIAL WORKER'S NAME
Napoleon Spencer, [facility] [phone] Napoleon Spencer, [facility] [phone]

## 2024-01-02 NOTE — PATIENT PROFILE ADULT - PRO INTERPRETER NEED 2
[Patient/Caregiver not ready to engage] : , patient/caregiver not ready to engage [Patient/Collateral not ready to engage] : , patient/collateral not ready to engage [AdvancecareDate] : 4/21/23 English

## 2024-01-02 NOTE — PROGRESS NOTE ADULT - SUBJECTIVE AND OBJECTIVE BOX
Patient is a 68y old  Male who presents with a chief complaint of COVID-19 (02 Jan 2024 04:39)      SUBJECTIVE / OVERNIGHT EVENTS:    MEDICATIONS  (STANDING):  aspirin enteric coated 81 milliGRAM(s) Oral daily  atorvastatin 20 milliGRAM(s) Oral at bedtime  benzonatate 100 milliGRAM(s) Oral every 8 hours  chlorhexidine 2% Cloths 1 Application(s) Topical daily  enoxaparin Injectable 40 milliGRAM(s) SubCutaneous every 24 hours  fluticasone propionate 50 MICROgram(s)/spray Nasal Spray 1 Spray(s) Both Nostrils two times a day  guaiFENesin  milliGRAM(s) Oral every 12 hours  influenza  Vaccine (HIGH DOSE) 0.7 milliLiter(s) IntraMuscular once  magnesium oxide 400 milliGRAM(s) Oral daily  multivitamin 1 Tablet(s) Oral daily  NIFEdipine XL 30 milliGRAM(s) Oral every 24 hours  senna 1 Tablet(s) Oral at bedtime  sildenafil (REVATIO) 10 milliGRAM(s) Oral two times a day  tacrolimus ER Tablet (ENVARSUS XR) 2 milliGRAM(s) Oral <User Schedule>    MEDICATIONS  (PRN):  acetaminophen     Tablet .. 650 milliGRAM(s) Oral every 6 hours PRN Temp greater or equal to 38C (100.4F), Mild Pain (1 - 3), Moderate Pain (4 - 6), Severe Pain (7 - 10)  benzocaine/menthol Lozenge 1 Lozenge Oral every 2 hours PRN Sore Throat  sodium chloride 0.65% Nasal 1 Spray(s) Both Nostrils four times a day PRN Nasal Congestion      CAPILLARY BLOOD GLUCOSE        I&O's Summary    01 Jan 2024 07:01  -  02 Jan 2024 07:00  --------------------------------------------------------  IN: 900 mL / OUT: 0 mL / NET: 900 mL        PHYSICAL EXAM:  Vital Signs Last 24 Hrs  T(C): 36.6 (01 Jan 2024 21:12), Max: 36.6 (01 Jan 2024 21:12)  T(F): 97.9 (01 Jan 2024 21:12), Max: 97.9 (01 Jan 2024 21:12)  HR: 102 (01 Jan 2024 21:12) (102 - 102)  BP: 105/75 (01 Jan 2024 21:12) (105/75 - 105/75)  BP(mean): --  RR: 18 (01 Jan 2024 21:12) (18 - 18)  SpO2: 96% (01 Jan 2024 21:12) (96% - 96%)    Parameters below as of 02 Jan 2024 07:39  Patient On (Oxygen Delivery Method): room air        GEN: (fe)male in NAD, appears comfortable, no diaphoresis  EYES: No scleral injection, EOMI  ENTM: neck supple & symmetric without tracheal deviation, moist membranes, no gross hearing impairment, thyroid gland not enlarged  CV: +S1/S2, no m/r/g, no abdominal bruit, no LE edema  RESP: breathing comfortably, no respiratory accessory muscle use, CTAB, no w/r/r  GI: normoactive BS, soft, NTND, no rebounding/guarding, no palpable masses    LABS:                        15.3   12.96 )-----------( 278      ( 01 Jan 2024 06:50 )             45.9     01-02    137  |  103  |  20  ----------------------------<  121<H>  4.4   |  23  |  0.99    Ca    9.6      02 Jan 2024 07:30    TPro  7.0  /  Alb  4.0  /  TBili  0.2  /  DBili  x   /  AST  15  /  ALT  12  /  AlkPhos  109  01-01          Urinalysis Basic - ( 02 Jan 2024 07:30 )    Color: x / Appearance: x / SG: x / pH: x  Gluc: 121 mg/dL / Ketone: x  / Bili: x / Urobili: x   Blood: x / Protein: x / Nitrite: x   Leuk Esterase: x / RBC: x / WBC x   Sq Epi: x / Non Sq Epi: x / Bacteria: x          RADIOLOGY & ADDITIONAL TESTS:  Results Reviewed:   Imaging Personally Reviewed:  Electrocardiogram Personally Reviewed:    COORDINATION OF CARE:  Care Discussed with Consultants/Other Providers [Y/N]:  Prior or Outpatient Records Reviewed [Y/N]:   Patient is a 68y old  Male who presents with a chief complaint of COVID-19 (02 Jan 2024 04:39)      SUBJECTIVE / OVERNIGHT EVENTS: Patient seen and examined at bedside. No acute events overnight. Nasal congestion is improving. Discussed with HF yesterday and they will follow up today.    MEDICATIONS  (STANDING):  aspirin enteric coated 81 milliGRAM(s) Oral daily  atorvastatin 20 milliGRAM(s) Oral at bedtime  benzonatate 100 milliGRAM(s) Oral every 8 hours  chlorhexidine 2% Cloths 1 Application(s) Topical daily  enoxaparin Injectable 40 milliGRAM(s) SubCutaneous every 24 hours  fluticasone propionate 50 MICROgram(s)/spray Nasal Spray 1 Spray(s) Both Nostrils two times a day  guaiFENesin  milliGRAM(s) Oral every 12 hours  influenza  Vaccine (HIGH DOSE) 0.7 milliLiter(s) IntraMuscular once  magnesium oxide 400 milliGRAM(s) Oral daily  multivitamin 1 Tablet(s) Oral daily  NIFEdipine XL 30 milliGRAM(s) Oral every 24 hours  senna 1 Tablet(s) Oral at bedtime  sildenafil (REVATIO) 10 milliGRAM(s) Oral two times a day  tacrolimus ER Tablet (ENVARSUS XR) 2 milliGRAM(s) Oral <User Schedule>    MEDICATIONS  (PRN):  acetaminophen     Tablet .. 650 milliGRAM(s) Oral every 6 hours PRN Temp greater or equal to 38C (100.4F), Mild Pain (1 - 3), Moderate Pain (4 - 6), Severe Pain (7 - 10)  benzocaine/menthol Lozenge 1 Lozenge Oral every 2 hours PRN Sore Throat  sodium chloride 0.65% Nasal 1 Spray(s) Both Nostrils four times a day PRN Nasal Congestion      CAPILLARY BLOOD GLUCOSE        I&O's Summary    01 Jan 2024 07:01  -  02 Jan 2024 07:00  --------------------------------------------------------  IN: 900 mL / OUT: 0 mL / NET: 900 mL        PHYSICAL EXAM:  Vital Signs Last 24 Hrs  T(C): 36.6 (01 Jan 2024 21:12), Max: 36.6 (01 Jan 2024 21:12)  T(F): 97.9 (01 Jan 2024 21:12), Max: 97.9 (01 Jan 2024 21:12)  HR: 102 (01 Jan 2024 21:12) (102 - 102)  BP: 105/75 (01 Jan 2024 21:12) (105/75 - 105/75)  BP(mean): --  RR: 18 (01 Jan 2024 21:12) (18 - 18)  SpO2: 96% (01 Jan 2024 21:12) (96% - 96%)    Parameters below as of 02 Jan 2024 07:39  Patient On (Oxygen Delivery Method): room air        GEN: male in NAD, appears comfortable, no diaphoresis  EYES: No scleral injection, EOMI  ENTM: neck supple & symmetric without tracheal deviation, moist membranes, no gross hearing impairment, thyroid gland not enlarged  CV: +S1/S2, no m/r/g, no abdominal bruit, no LE edema  RESP: breathing comfortably, no respiratory accessory muscle use, CTAB, no w/r/r  GI: normoactive BS, soft, NTND, no rebounding/guarding, no palpable masses    LABS:                        15.3   12.96 )-----------( 278      ( 01 Jan 2024 06:50 )             45.9     01-02    137  |  103  |  20  ----------------------------<  121<H>  4.4   |  23  |  0.99    Ca    9.6      02 Jan 2024 07:30    TPro  7.0  /  Alb  4.0  /  TBili  0.2  /  DBili  x   /  AST  15  /  ALT  12  /  AlkPhos  109  01-01          Urinalysis Basic - ( 02 Jan 2024 07:30 )    Color: x / Appearance: x / SG: x / pH: x  Gluc: 121 mg/dL / Ketone: x  / Bili: x / Urobili: x   Blood: x / Protein: x / Nitrite: x   Leuk Esterase: x / RBC: x / WBC x   Sq Epi: x / Non Sq Epi: x / Bacteria: x          RADIOLOGY & ADDITIONAL TESTS:  Results Reviewed:   Imaging Personally Reviewed:  Electrocardiogram Personally Reviewed:    COORDINATION OF CARE:  Care Discussed with Consultants/Other Providers [Y/N]:  Prior or Outpatient Records Reviewed [Y/N]:

## 2024-01-02 NOTE — DISCHARGE NOTE NURSING/CASE MANAGEMENT/SOCIAL WORK - NSDCVIVACCINE_GEN_ALL_CORE_FT
HepB-CpG; 03-Jun-2022 06:31; Kelsy Barakat (RN); [employer] [id number] (Exp. Date: 11-Mar-2023); IntraMuscular; Deltoid Right.; 20 MICROGram(s); VIS (VIS Published: 15-Oct-2021, VIS Presented: 03-Jun-2022);   influenza, injectable, quadrivalent, preservative free; 10-Gus-2022 06:03; Sachi Nicholson); [employer]; [id number] (Exp. Date: 30-Jun-2022); IntraMuscular; Deltoid Right.; 0.5 milliLiter(s); VIS (VIS Published: 06-Aug-2021, VIS Presented: 10-Gus-2022);    HepB-CpG; 03-Jun-2022 06:31; Kelsy Barakat (RN); [employer] [id number] (Exp. Date: 11-Mar-2023); IntraMuscular; Deltoid Right.; 20 MICROGram(s); VIS (VIS Published: 15-Oct-2021, VIS Presented: 03-Jun-2022);   influenza, injectable, quadrivalent, preservative free; 10-Gus-2022 06:03; Sachi Nicholson); [employer]; [id number] (Exp. Date: 30-Jun-2022); IntraMuscular; Deltoid Right.; 0.5 milliLiter(s); VIS (VIS Published: 06-Aug-2021, VIS Presented: 10-Gus-2022);

## 2024-01-02 NOTE — PROGRESS NOTE ADULT - PROBLEM SELECTOR PLAN 2
- Remdesivir for 5 days  - Antitussives standing  - Flonase for nasal congestion  - Follow up transplant ID
- Remdesivir for 5 days (end 1/1)  - Antitussives standing  - Flonase for nasal congestion  - Follow up transplant ID
- Remdesivir for 5 days (end 1/1)  - Antitussives standing  - Flonase for nasal congestion  - Transplant ID cleared for DC (discussed on 12/31)
- Remdesivir for 5 days  - Antitussives standing  - Flonase for nasal congestion  - Follow up transplant ID
- Remdesivir for 5 days (end 1/1)  - Antitussives standing  - Flonase for nasal congestion  - Follow up transplant ID
- Remdesivir for 5 days (end 1/1)  - Antitussives standing  - Flonase for nasal congestion  - Transplant ID cleared for DC (discussed on 12/31)

## 2024-01-02 NOTE — DISCHARGE NOTE NURSING/CASE MANAGEMENT/SOCIAL WORK - NSDCFUADDAPPT_GEN_ALL_CORE_FT
You have a heart failure appointment at [facility] scheduled for 1/8/24 at 10:30AM.     APPTS ARE READY TO BE MADE: [x ] YES    Best Family or Patient Contact (if needed):    Additional Information about above appointments (if needed):    1: Primary care in 1 week   2: HF scheduled for 1/8  3:     Other comments or requests:    You have a heart failure appointment at [facility] scheduled for 1/8/24 at 10:30AM.     APPTS ARE READY TO BE MADE: [x ] YES    Best Family or Patient Contact (if needed):    Additional Information about above appointments (if needed):    1: Primary care in 1 week   2: HF scheduled for 1/8  3:     Other comments or requests:

## 2024-01-02 NOTE — PROGRESS NOTE ADULT - SUBJECTIVE AND OBJECTIVE BOX
ADVANCED HEART FAILURE & TRANSPLANT  - PROGRESS NOTE  *To reach the NS2 Team from 8am to 5pm, please call [phone]   ___________________________________________________________________________  Subjective:    Medications:  acetaminophen     Tablet .. 650 milliGRAM(s) Oral every 6 hours PRN  aspirin enteric coated 81 milliGRAM(s) Oral daily  atorvastatin 20 milliGRAM(s) Oral at bedtime  benzocaine/menthol Lozenge 1 Lozenge Oral every 2 hours PRN  benzonatate 100 milliGRAM(s) Oral every 8 hours  chlorhexidine 2% Cloths 1 Application(s) Topical daily  enoxaparin Injectable 40 milliGRAM(s) SubCutaneous every 24 hours  fluticasone propionate 50 MICROgram(s)/spray Nasal Spray 1 Spray(s) Both Nostrils two times a day  guaiFENesin  milliGRAM(s) Oral every 12 hours  influenza  Vaccine (HIGH DOSE) 0.7 milliLiter(s) IntraMuscular once  magnesium oxide 400 milliGRAM(s) Oral daily  multivitamin 1 Tablet(s) Oral daily  NIFEdipine XL 30 milliGRAM(s) Oral every 24 hours  senna 1 Tablet(s) Oral at bedtime  sildenafil (REVATIO) 10 milliGRAM(s) Oral two times a day  sodium chloride 0.65% Nasal 1 Spray(s) Both Nostrils four times a day PRN  tacrolimus ER Tablet (ENVARSUS XR) 2 milliGRAM(s) Oral <User Schedule>      Physical Exam:    Vitals:  Vital Signs Last 24 Hours  T(C): 36.6 (24 @ 21:12), Max: 36.6 (24 @ 21:12)  HR: 102 (24 @ 21:12) (102 - 102)  BP: 105/75 (24 @ 21:12) (105/75 - 105/75)  RR: 18 (24 @ 21:12) (18 - 18)  SpO2: 96% (24 @ 21:12) (96% - 96%)    Weight in k.5 ( @ 05:31)    I&O's Summary    2024 07:01  -  2024 07:00  --------------------------------------------------------  IN: 900 mL / OUT: 0 mL / NET: 900 mL        Tele:    General: No distress. Comfortable.  HEENT: EOM intact.  Neck: Neck supple. JVP not elevated. No masses  Chest: Clear to auscultation bilaterally  CV: Normal S1 and S2. No murmurs, rub, or gallops. Radial pulses normal.  Abdomen: Soft, non-distended, non-tender  Skin: No rashes or skin breakdown  Neurology: Alert and oriented times three. Sensation intact  Psych: Affect normal    Labs:                        15.3   12.96 )-----------( 278      ( 2024 06:50 )             45.9         137  |  103  |  20  ----------------------------<  121<H>  4.4   |  23  |  0.99    Ca    9.6      2024 07:30    TPro  7.0  /  Alb  4.0  /  TBili  0.2  /  DBili  x   /  AST  15  /  ALT  12  /  AlkPhos  109                     ADVANCED HEART FAILURE & TRANSPLANT  - PROGRESS NOTE  *To reach the NS2 Team from 8am to 5pm, please call [phone]   ___________________________________________________________________________  Subjective:    Medications:  acetaminophen     Tablet .. 650 milliGRAM(s) Oral every 6 hours PRN  aspirin enteric coated 81 milliGRAM(s) Oral daily  atorvastatin 20 milliGRAM(s) Oral at bedtime  benzocaine/menthol Lozenge 1 Lozenge Oral every 2 hours PRN  benzonatate 100 milliGRAM(s) Oral every 8 hours  chlorhexidine 2% Cloths 1 Application(s) Topical daily  enoxaparin Injectable 40 milliGRAM(s) SubCutaneous every 24 hours  fluticasone propionate 50 MICROgram(s)/spray Nasal Spray 1 Spray(s) Both Nostrils two times a day  guaiFENesin  milliGRAM(s) Oral every 12 hours  influenza  Vaccine (HIGH DOSE) 0.7 milliLiter(s) IntraMuscular once  magnesium oxide 400 milliGRAM(s) Oral daily  multivitamin 1 Tablet(s) Oral daily  NIFEdipine XL 30 milliGRAM(s) Oral every 24 hours  senna 1 Tablet(s) Oral at bedtime  sildenafil (REVATIO) 10 milliGRAM(s) Oral two times a day  sodium chloride 0.65% Nasal 1 Spray(s) Both Nostrils four times a day PRN  tacrolimus ER Tablet (ENVARSUS XR) 2 milliGRAM(s) Oral <User Schedule>      Physical Exam:    Vitals:  Vital Signs Last 24 Hours  T(C): 36.6 (24 @ 21:12), Max: 36.6 (24 @ 21:12)  HR: 102 (24 @ 21:12) (102 - 102)  BP: 105/75 (24 @ 21:12) (105/75 - 105/75)  RR: 18 (24 @ 21:12) (18 - 18)  SpO2: 96% (24 @ 21:12) (96% - 96%)    Weight in k.5 ( @ 05:31)    I&O's Summary    2024 07:01  -  2024 07:00  --------------------------------------------------------  IN: 900 mL / OUT: 0 mL / NET: 900 mL        Tele:    General: No distress. Comfortable.  HEENT: EOM intact.  Neck: Neck supple. JVP not elevated. No masses  Chest: Clear to auscultation bilaterally  CV: Normal S1 and S2. No murmurs, rub, or gallops. Radial pulses normal.  Abdomen: Soft, non-distended, non-tender  Skin: No rashes or skin breakdown  Neurology: Alert and oriented times three. Sensation intact  Psych: Affect normal    Labs:                        15.3   12.96 )-----------( 278      ( 2024 06:50 )             45.9         137  |  103  |  20  ----------------------------<  121<H>  4.4   |  23  |  0.99    Ca    9.6      2024 07:30    TPro  7.0  /  Alb  4.0  /  TBili  0.2  /  DBili  x   /  AST  15  /  ALT  12  /  AlkPhos  109

## 2024-01-08 ENCOUNTER — APPOINTMENT (OUTPATIENT)
Dept: HEART FAILURE | Facility: CLINIC | Age: 69
End: 2024-01-08
Payer: MEDICARE

## 2024-01-08 ENCOUNTER — OUTPATIENT (OUTPATIENT)
Dept: OUTPATIENT SERVICES | Facility: HOSPITAL | Age: 69
LOS: 1 days | End: 2024-01-08
Payer: MEDICARE

## 2024-01-08 ENCOUNTER — APPOINTMENT (OUTPATIENT)
Dept: CV DIAGNOSITCS | Facility: HOSPITAL | Age: 69
End: 2024-01-08

## 2024-01-08 VITALS
WEIGHT: 142 LBS | SYSTOLIC BLOOD PRESSURE: 123 MMHG | HEART RATE: 108 BPM | DIASTOLIC BLOOD PRESSURE: 86 MMHG | OXYGEN SATURATION: 98 % | TEMPERATURE: 98 F | BODY MASS INDEX: 20.33 KG/M2 | HEIGHT: 70 IN

## 2024-01-08 DIAGNOSIS — Z98.890 OTHER SPECIFIED POSTPROCEDURAL STATES: Chronic | ICD-10-CM

## 2024-01-08 DIAGNOSIS — Z94.1 HEART TRANSPLANT STATUS: ICD-10-CM

## 2024-01-08 DIAGNOSIS — Z95.0 PRESENCE OF CARDIAC PACEMAKER: Chronic | ICD-10-CM

## 2024-01-08 LAB
ALBUMIN SERPL ELPH-MCNC: 4.3 G/DL
ALP BLD-CCNC: 117 U/L
ALT SERPL-CCNC: 12 U/L
ANION GAP SERPL CALC-SCNC: 13 MMOL/L
AST SERPL-CCNC: 12 U/L
BASOPHILS # BLD AUTO: 0.09 K/UL
BASOPHILS NFR BLD AUTO: 0.7 %
BILIRUB SERPL-MCNC: 0.4 MG/DL
BUN SERPL-MCNC: 16 MG/DL
CALCIUM SERPL-MCNC: 9.7 MG/DL
CHLORIDE SERPL-SCNC: 103 MMOL/L
CO2 SERPL-SCNC: 24 MMOL/L
CREAT SERPL-MCNC: 1.07 MG/DL
EGFR: 77 ML/MIN/1.73M2
EOSINOPHIL # BLD AUTO: 0.2 K/UL
EOSINOPHIL NFR BLD AUTO: 1.5 %
GLUCOSE SERPL-MCNC: 119 MG/DL
HCT VFR BLD CALC: 46.8 %
HGB BLD-MCNC: 15.2 G/DL
IMM GRANULOCYTES NFR BLD AUTO: 1 %
LYMPHOCYTES # BLD AUTO: 4.4 K/UL
LYMPHOCYTES NFR BLD AUTO: 34.1 %
MAGNESIUM SERPL-MCNC: 1.7 MG/DL
MAN DIFF?: NORMAL
MCHC RBC-ENTMCNC: 27.9 PG
MCHC RBC-ENTMCNC: 32.5 GM/DL
MCV RBC AUTO: 86 FL
MONOCYTES # BLD AUTO: 0.61 K/UL
MONOCYTES NFR BLD AUTO: 4.7 %
NEUTROPHILS # BLD AUTO: 7.49 K/UL
NEUTROPHILS NFR BLD AUTO: 58 %
PLATELET # BLD AUTO: 378 K/UL
POTASSIUM SERPL-SCNC: 4.6 MMOL/L
PROT SERPL-MCNC: 7.2 G/DL
RBC # BLD: 5.44 M/UL
RBC # FLD: 13.2 %
SODIUM SERPL-SCNC: 139 MMOL/L
TACROLIMUS SERPL-MCNC: 11 NG/ML
WBC # FLD AUTO: 12.92 K/UL

## 2024-01-08 PROCEDURE — 93306 TTE W/DOPPLER COMPLETE: CPT | Mod: 26

## 2024-01-08 PROCEDURE — 76376 3D RENDER W/INTRP POSTPROCES: CPT

## 2024-01-08 PROCEDURE — ZZZZZ: CPT

## 2024-01-08 PROCEDURE — 93356 MYOCRD STRAIN IMG SPCKL TRCK: CPT

## 2024-01-08 PROCEDURE — 93306 TTE W/DOPPLER COMPLETE: CPT

## 2024-01-08 PROCEDURE — 76376 3D RENDER W/INTRP POSTPROCES: CPT | Mod: 26

## 2024-01-08 RX ORDER — PANCRELIPASE 120000; 24000; 76000 [USP'U]/1; [USP'U]/1; [USP'U]/1
24000-76000 CAPSULE, DELAYED RELEASE PELLETS ORAL
Qty: 90 | Refills: 0 | Status: DISCONTINUED | COMMUNITY
Start: 2023-10-03 | End: 2024-01-08

## 2024-01-08 RX ORDER — LACTASE 9000 UNIT
9000 TABLET ORAL 3 TIMES DAILY
Qty: 90 | Refills: 3 | Status: DISCONTINUED | COMMUNITY
Start: 2023-10-03 | End: 2024-01-08

## 2024-01-08 RX ORDER — METFORMIN ER 500 MG 500 MG/1
500 TABLET ORAL DAILY
Qty: 90 | Refills: 3 | Status: DISCONTINUED | COMMUNITY
Start: 2022-07-29 | End: 2024-01-08

## 2024-01-12 ENCOUNTER — OUTPATIENT (OUTPATIENT)
Dept: OUTPATIENT SERVICES | Facility: HOSPITAL | Age: 69
LOS: 1 days | End: 2024-01-12
Payer: MEDICARE

## 2024-01-12 ENCOUNTER — APPOINTMENT (OUTPATIENT)
Dept: INTERNAL MEDICINE | Facility: CLINIC | Age: 69
End: 2024-01-12
Payer: MEDICARE

## 2024-01-12 VITALS
BODY MASS INDEX: 20.47 KG/M2 | WEIGHT: 143 LBS | HEART RATE: 113 BPM | HEIGHT: 70 IN | SYSTOLIC BLOOD PRESSURE: 100 MMHG | OXYGEN SATURATION: 98 % | DIASTOLIC BLOOD PRESSURE: 62 MMHG

## 2024-01-12 DIAGNOSIS — Z87.898 PERSONAL HISTORY OF OTHER SPECIFIED CONDITIONS: ICD-10-CM

## 2024-01-12 DIAGNOSIS — Z95.0 PRESENCE OF CARDIAC PACEMAKER: Chronic | ICD-10-CM

## 2024-01-12 DIAGNOSIS — R51.9 HEADACHE, UNSPECIFIED: ICD-10-CM

## 2024-01-12 DIAGNOSIS — Z87.891 PERSONAL HISTORY OF NICOTINE DEPENDENCE: ICD-10-CM

## 2024-01-12 DIAGNOSIS — Z87.19 PERSONAL HISTORY OF OTHER DISEASES OF THE DIGESTIVE SYSTEM: ICD-10-CM

## 2024-01-12 DIAGNOSIS — I10 ESSENTIAL (PRIMARY) HYPERTENSION: ICD-10-CM

## 2024-01-12 DIAGNOSIS — M54.2 CERVICALGIA: ICD-10-CM

## 2024-01-12 DIAGNOSIS — Z00.00 ENCOUNTER FOR GENERAL ADULT MEDICAL EXAMINATION W/OUT ABNORMAL FINDINGS: ICD-10-CM

## 2024-01-12 DIAGNOSIS — Z98.890 OTHER SPECIFIED POSTPROCEDURAL STATES: Chronic | ICD-10-CM

## 2024-01-12 DIAGNOSIS — U09.9 POST COVID-19 CONDITION, UNSPECIFIED: ICD-10-CM

## 2024-01-12 PROCEDURE — G0439: CPT

## 2024-01-12 PROCEDURE — 99397 PER PM REEVAL EST PAT 65+ YR: CPT

## 2024-01-12 NOTE — ASSESSMENT
[FreeTextEntry1] :  HCM: - Lung ca screening--doesn't qualify based on pack-years - Colonoscopy 2023, next due 2025 - PSA, a1c, lipids, CMP  RTC 6 months for follow-up

## 2024-01-12 NOTE — PHYSICAL EXAM
[No Acute Distress] : no acute distress [Well Developed] : well developed [PERRL] : pupils equal round and reactive to light [EOMI] : extraocular movements intact [Normal Oropharynx] : the oropharynx was normal [Normal TMs] : both tympanic membranes were normal [No Lymphadenopathy] : no lymphadenopathy [Supple] : supple [No Respiratory Distress] : no respiratory distress  [No Accessory Muscle Use] : no accessory muscle use [Regular Rhythm] : with a regular rhythm [Normal S1, S2] : normal S1 and S2 [No Murmur] : no murmur heard [No Edema] : there was no peripheral edema [Soft] : abdomen soft [Non Tender] : non-tender [Non-distended] : non-distended [No Masses] : no abdominal mass palpated [Grossly Normal Strength/Tone] : grossly normal strength/tone [No Rash] : no rash [Normal Affect] : the affect was normal [Normal Insight/Judgement] : insight and judgment were intact [de-identified] : ?pinguecula noted of the bilateral eyes [de-identified] : Decreased breath sounds bilaterally [de-identified] : Tachycardic [de-identified] : No facial asymmetry. Strength equal in the upper and lower extremities. Mild tremor with finger-to-nose normal, heel-to-shin normal

## 2024-01-12 NOTE — HEALTH RISK ASSESSMENT
[No] : In the past 12 months have you used drugs other than those required for medical reasons? No [No falls in past year] : Patient reported no falls in the past year [0] : 2) Feeling down, depressed, or hopeless: Not at all (0) [PHQ-2 Negative - No further assessment needed] : PHQ-2 Negative - No further assessment needed [With Family] : lives with family [] :  [Former] : Former [de-identified] : Limited [de-identified] : Breakfast: Bread and eggs, Lunch, Dinner: Porkchops, beans, rice, plaintain. Lots of fruits. Limited on diet due to immunosuppression [CDK3Sghsg] : 0 [Reports changes in hearing] : Reports no changes in hearing [Reports changes in vision] : Reports no changes in vision [Reports changes in dental health] : Reports no changes in dental health [de-identified] : Needs to see dentist [0-4] : 0-4 [> 15 Years] : > 15 Years

## 2024-01-12 NOTE — HISTORY OF PRESENT ILLNESS
[de-identified] : Gocool, Lennox is a 67yo M with PMhx of CAD/HFrEF s/p heart txp, DM, and CKD3 who presents for CPE.   A few hospitalizations over past year, mostly due to viral illness inlcuding COVID. Had diarrhea which resolved with med changes Ever since most recent discharge with COVID, he has been having neck pain, fatigue, and HSIEH and worsening orthopnea. Sinus congestion and dry cough which are improving.

## 2024-01-18 ENCOUNTER — OUTPATIENT (OUTPATIENT)
Dept: OUTPATIENT SERVICES | Facility: HOSPITAL | Age: 69
LOS: 1 days | End: 2024-01-18
Payer: MEDICARE

## 2024-01-18 ENCOUNTER — APPOINTMENT (OUTPATIENT)
Dept: RADIOLOGY | Facility: IMAGING CENTER | Age: 69
End: 2024-01-18
Payer: MEDICARE

## 2024-01-18 DIAGNOSIS — Z94.1 HEART TRANSPLANT STATUS: ICD-10-CM

## 2024-01-18 DIAGNOSIS — Z98.890 OTHER SPECIFIED POSTPROCEDURAL STATES: Chronic | ICD-10-CM

## 2024-01-18 DIAGNOSIS — Z95.0 PRESENCE OF CARDIAC PACEMAKER: Chronic | ICD-10-CM

## 2024-01-18 PROCEDURE — 77080 DXA BONE DENSITY AXIAL: CPT

## 2024-01-18 PROCEDURE — 77080 DXA BONE DENSITY AXIAL: CPT | Mod: 26

## 2024-01-23 DIAGNOSIS — Z00.00 ENCOUNTER FOR GENERAL ADULT MEDICAL EXAMINATION WITHOUT ABNORMAL FINDINGS: ICD-10-CM

## 2024-01-23 DIAGNOSIS — U09.9 POST COVID-19 CONDITION, UNSPECIFIED: ICD-10-CM

## 2024-01-23 DIAGNOSIS — M54.2 CERVICALGIA: ICD-10-CM

## 2024-01-25 LAB
ALBUMIN SERPL ELPH-MCNC: 5.2 G/DL
ALP BLD-CCNC: 112 U/L
ALT SERPL-CCNC: 17 U/L
ANION GAP SERPL CALC-SCNC: 13 MMOL/L
AST SERPL-CCNC: 18 U/L
BASOPHILS # BLD AUTO: 0.07 K/UL
BASOPHILS NFR BLD AUTO: 0.8 %
BILIRUB SERPL-MCNC: 0.9 MG/DL
BUN SERPL-MCNC: 20 MG/DL
CALCIUM SERPL-MCNC: 10.5 MG/DL
CHLORIDE SERPL-SCNC: 102 MMOL/L
CO2 SERPL-SCNC: 23 MMOL/L
CREAT SERPL-MCNC: 1.26 MG/DL
EGFR: 63 ML/MIN/1.73M2
EOSINOPHIL # BLD AUTO: 0.29 K/UL
EOSINOPHIL NFR BLD AUTO: 3.3 %
GLUCOSE SERPL-MCNC: 111 MG/DL
HCT VFR BLD CALC: 50.5 %
HGB BLD-MCNC: 16.4 G/DL
IMM GRANULOCYTES NFR BLD AUTO: 0.1 %
LYMPHOCYTES # BLD AUTO: 3.38 K/UL
LYMPHOCYTES NFR BLD AUTO: 38.4 %
MAGNESIUM SERPL-MCNC: 2 MG/DL
MAN DIFF?: NORMAL
MCHC RBC-ENTMCNC: 27.5 PG
MCHC RBC-ENTMCNC: 32.5 GM/DL
MCV RBC AUTO: 84.7 FL
MONOCYTES # BLD AUTO: 0.48 K/UL
MONOCYTES NFR BLD AUTO: 5.4 %
NEUTROPHILS # BLD AUTO: 4.58 K/UL
NEUTROPHILS NFR BLD AUTO: 52 %
PLATELET # BLD AUTO: 251 K/UL
POTASSIUM SERPL-SCNC: 4.6 MMOL/L
PROT SERPL-MCNC: 7.9 G/DL
RBC # BLD: 5.96 M/UL
RBC # FLD: 13.7 %
SODIUM SERPL-SCNC: 138 MMOL/L
TACROLIMUS SERPL-MCNC: 6.5 NG/ML
WBC # FLD AUTO: 8.81 K/UL

## 2024-01-26 LAB
CHOLEST SERPL-MCNC: 147 MG/DL
CREAT SPEC-SCNC: 409 MG/DL
ESTIMATED AVERAGE GLUCOSE: 126 MG/DL
HBA1C MFR BLD HPLC: 6 %
HDLC SERPL-MCNC: 43 MG/DL
LDLC SERPL CALC-MCNC: 80 MG/DL
MICROALBUMIN 24H UR DL<=1MG/L-MCNC: 7.4 MG/DL
MICROALBUMIN/CREAT 24H UR-RTO: 18 MG/G
NONHDLC SERPL-MCNC: 104 MG/DL
PSA SERPL-MCNC: 1.79 NG/ML
TRIGL SERPL-MCNC: 135 MG/DL

## 2024-01-29 ENCOUNTER — RX RENEWAL (OUTPATIENT)
Age: 69
End: 2024-01-29

## 2024-01-29 LAB
CMV DNA SPEC QL NAA+PROBE: NOT DETECTED IU/ML
CMVPCR LOG: NOT DETECTED LOG10IU/ML

## 2024-01-29 RX ORDER — NIFEDIPINE 30 MG/1
30 TABLET, FILM COATED, EXTENDED RELEASE ORAL DAILY
Qty: 30 | Refills: 5 | Status: ACTIVE | COMMUNITY
Start: 2023-06-13 | End: 1900-01-01

## 2024-01-29 RX ORDER — MYCOPHENOLATE MOFETIL 250 MG/1
250 CAPSULE ORAL TWICE DAILY
Qty: 120 | Refills: 5 | Status: ACTIVE | COMMUNITY
Start: 2022-06-29 | End: 1900-01-01

## 2024-02-09 ENCOUNTER — APPOINTMENT (OUTPATIENT)
Dept: ENDOCRINOLOGY | Facility: CLINIC | Age: 69
End: 2024-02-09
Payer: MEDICARE

## 2024-02-09 VITALS
HEART RATE: 100 BPM | BODY MASS INDEX: 20.9 KG/M2 | DIASTOLIC BLOOD PRESSURE: 76 MMHG | SYSTOLIC BLOOD PRESSURE: 118 MMHG | WEIGHT: 146 LBS | HEIGHT: 70 IN | OXYGEN SATURATION: 99 %

## 2024-02-09 PROCEDURE — 99214 OFFICE O/P EST MOD 30 MIN: CPT

## 2024-02-09 NOTE — HISTORY OF PRESENT ILLNESS
[FreeTextEntry1] : 68 M male with PMHx HTN, HLD, type 2 DM, chronic HFrEF, (EF 25-30% by Echo) who underwent OHT on 22. Endocrinology consulted for assistance with management of steroid induced hyperglycemia/dm2.  Controlled type 2 diabetes mellitus with hyperglycemia. Diagnosis: Type 2 DM, diagnosed initially 2022.   Interval history: He was hospitalized in December to new years 2023- for COVID, received infusion.   23: A1c 5.5% A1c 24: 6.0%  Previous Medications Glimepiride 1 mg was not on steroids at that time. Diagnosed with diabetes 2022.   Current Medications: Farxiga 5 mg daily-- had some increased urination in beginning but now has resolved.  Taking 3 times weekly.   Previous Medications: Levemir  6 units QHS -- 5pm  Metformin 500 mg once daily  Meter Readings: See scanned logs. AM [phone]  Current Steroid Regimen: Based on biopsy results -Prednisone tapered to 12.5 mg BID on  with BG at goal since dinner Reduced to 10 mg BID on  Reduced to 7.5 mg BID on  Prednisone 2 mg daily- lowered on 23.  PRednisone taper completed, no longer on prednisone.   Current Immunosuppressive Regimen: Currently on tacrolimus 1mg q AM 1.5 mg q PM  Labs in hospital: egfr 60, LFT wnl, A1C 6.7   DIET: breakfast: toast/egg/fruit Lunch: rice/pasta with meat or sandwich/soup Dinner: low carb options as above, low sodium  Exercise: Has therapist, walks outside, for 15 minutes or so at a time.    HTN  - outpt BP goal < 130/80 -on hydralazine and nifedipine - outpatient annual urinary microalb/cr ratio.-- states already checked.   HLD (hyperlipidemia). - LDL goal < 70 -Rosuvastatin 5 mg daily -- on with cardiology  labs 22: GFR 85-- at time of biopsy, doing blood work weekly.   optho: had dilated eye exam 3/2021-- normal, other than reading glasses prescription, some blocked tear ducts.

## 2024-02-09 NOTE — ASSESSMENT
[FreeTextEntry1] : 68 M male with PMHx HTN, HLD, type 2 DM, chronic HFrEF, (EF 25-30% by Echo) who underwent OHT on 6/21/22, currently on tacrolimus 1mg q AM 1.5 mg q PM, presents for follow up s/p transplant for steroid/transplant induced diabetes mellitus.   1. Steroid/transplant induced diabetes mellitus. A1c 6.7% previously A1c 1/26/23: 5.5% No longer on Prednisone.  A1c 1/25/2024: 6%, at goal  Plan: Continue Farxiga 5 mg daily. (really taking 3 times a week)  I offered and he declined CGM.   We expect transient fluctuations in GFR and creatinine. Ensure adequate hydration.  Discussed case with Dr. Nuñez, they will monitor magnesium levels and decrease MgOx.  Monitor FS pre-meal.   Discussed with patient effects of tacrolimus on pancreatic beta cell function, and that we will have to assess his response to steroid taper and long-term use of tacrolimus, he verbalizes understanding.   Optho referral given to patient with number, needs dilated eye exam.  Discussed future options for potential GLP-1 agonist. We don't necessarily need weight reduction in this patient, although GLP would provide cardiac benefit. We decided this is not the best option for patient.   - Discussed the risk of DKA and UTI.   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  Discussed that he/she should seek medical attention immediately if he/she experience any symptoms of tenderness, redness, or swelling of the genitals or the area from the genitals back to the rectum, and have a fever above 100.4 F or a general feeling of being unwell. These symptoms can worsen quickly, so it is important to seek treatment right away.  HLD (hyperlipidemia). - LDL goal < 70 -Rosuvastatin 5 mg daily -- on with cardiology
Patient bp reading 120/55 manually, 122/57 electronic
patient refusing subq heparin

## 2024-02-09 NOTE — PHYSICAL EXAM
[Alert] : alert [No Acute Distress] : no acute distress [Normal Hearing] : hearing was normal [No Neck Mass] : no neck mass was observed [No LAD] : no lymphadenopathy [Thyroid Not Enlarged] : the thyroid was not enlarged [No Respiratory Distress] : no respiratory distress [No Accessory Muscle Use] : no accessory muscle use [Clear to Auscultation] : lungs were clear to auscultation bilaterally [Normal PMI] : the apical impulse was normal [Normal S1, S2] : normal S1 and S2 [Normal Bowel Sounds] : normal bowel sounds [Not Tender] : non-tender [Soft] : abdomen soft [Oriented x3] : oriented to person, place, and time [Normal Affect] : the affect was normal [Normal Insight/Judgement] : insight and judgment were intact [Acanthosis Nigricans] : no acanthosis nigricans [de-identified] : tachycardic 112

## 2024-02-22 LAB
ALBUMIN SERPL ELPH-MCNC: 5.1 G/DL
ALP BLD-CCNC: 109 U/L
ALT SERPL-CCNC: 22 U/L
ANION GAP SERPL CALC-SCNC: 13 MMOL/L
AST SERPL-CCNC: 19 U/L
BASOPHILS # BLD AUTO: 0.06 K/UL
BASOPHILS NFR BLD AUTO: 0.8 %
BILIRUB SERPL-MCNC: 0.8 MG/DL
BUN SERPL-MCNC: 20 MG/DL
CALCIUM SERPL-MCNC: 10.1 MG/DL
CHLORIDE SERPL-SCNC: 104 MMOL/L
CO2 SERPL-SCNC: 26 MMOL/L
CREAT SERPL-MCNC: 1.22 MG/DL
EGFR: 65 ML/MIN/1.73M2
EOSINOPHIL # BLD AUTO: 0.16 K/UL
EOSINOPHIL NFR BLD AUTO: 2.2 %
GLUCOSE SERPL-MCNC: 119 MG/DL
HCT VFR BLD CALC: 49.5 %
HGB BLD-MCNC: 16.6 G/DL
IMM GRANULOCYTES NFR BLD AUTO: 0.1 %
LYMPHOCYTES # BLD AUTO: 2.63 K/UL
LYMPHOCYTES NFR BLD AUTO: 35.5 %
MAGNESIUM SERPL-MCNC: 2.1 MG/DL
MAN DIFF?: NORMAL
MCHC RBC-ENTMCNC: 28.5 PG
MCHC RBC-ENTMCNC: 33.5 GM/DL
MCV RBC AUTO: 84.9 FL
MONOCYTES # BLD AUTO: 0.45 K/UL
MONOCYTES NFR BLD AUTO: 6.1 %
NEUTROPHILS # BLD AUTO: 4.1 K/UL
NEUTROPHILS NFR BLD AUTO: 55.3 %
PLATELET # BLD AUTO: 274 K/UL
POTASSIUM SERPL-SCNC: 4.9 MMOL/L
PROT SERPL-MCNC: 7.8 G/DL
RBC # BLD: 5.83 M/UL
RBC # FLD: 14.6 %
SODIUM SERPL-SCNC: 142 MMOL/L
TACROLIMUS SERPL-MCNC: 4.2 NG/ML
WBC # FLD AUTO: 7.41 K/UL

## 2024-02-23 NOTE — PATIENT PROFILE ADULT - INTERNATIONAL TRAVEL
The patient's goals for the shift include      The clinical goals for the shift include pt will have no increased oxygen requirements will ambulate short distance with therapy today while on oxygen    Over the shift, the patient did not make progress toward the following goals. Barriers to progression include continue to diuresis and monitor I/O's closely. Recommendations to address these barriers include monitor RFP late evening or mid-afternoon..     No

## 2024-02-28 LAB
ALBUMIN SERPL ELPH-MCNC: 4.7 G/DL
ALP BLD-CCNC: 106 U/L
ALT SERPL-CCNC: 17 U/L
ANION GAP SERPL CALC-SCNC: 11 MMOL/L
AST SERPL-CCNC: 15 U/L
BILIRUB SERPL-MCNC: 0.8 MG/DL
BUN SERPL-MCNC: 16 MG/DL
CALCIUM SERPL-MCNC: 10 MG/DL
CHLORIDE SERPL-SCNC: 103 MMOL/L
CO2 SERPL-SCNC: 26 MMOL/L
CREAT SERPL-MCNC: 1.21 MG/DL
EGFR: 66 ML/MIN/1.73M2
GLUCOSE SERPL-MCNC: 113 MG/DL
POTASSIUM SERPL-SCNC: 4.7 MMOL/L
PROT SERPL-MCNC: 7.3 G/DL
SODIUM SERPL-SCNC: 140 MMOL/L
TACROLIMUS SERPL-MCNC: 6.5 NG/ML

## 2024-03-04 RX ORDER — SILDENAFIL 20 MG/1
20 TABLET ORAL TWICE DAILY
Qty: 15 | Refills: 5 | Status: DISCONTINUED | COMMUNITY
Start: 2022-07-01 | End: 2024-03-04

## 2024-03-04 NOTE — ED PROVIDER NOTE - NSCAREINITIATED _GEN_ER
Patient : Brett Emmanuel Age: 75 year old Sex: male   MRN: [id number] Encounter Date: 3/4/2024    History     Chief Complaint   Patient presents with    Shortness of Breath    Abdominal Pain         Shortness of Breath   Associated symptoms include shortness of breath.   Abdominal Pain  The primary symptoms of the illness include abdominal pain and shortness of breath.       Brett Emmanuel is a 75 year old presenting to the emergency department of 2-3 days of shortness of breath, wheezing, consistent with his COPD.  His larger concern actually has his diffuse abdominal pain and distention.  He is still eating and drinking well, he is having normal bowel movements.  He denies melena, hematochezia, fevers, body aches, chills.  He has not having any chest pain and does not even have a significant cough.  He is compliant with breathing treatments and says that he does not have home oxygen.    I have reviewed Brett Emmanuel's previous discharge summary from 01/18/2024 .  Note Review Summary:  Visit was for chest pain but also presenting history shows AAA rupture status post endovascular graft.  He was treated hypertension and anxiety and was discharged home.    Past/Family/Social History     Allergies   Allergen Reactions    Rocephin [Ceftriaxone] ANXIETY, SHORTNESS OF BREATH and Other (See Comments)     Redness to skin       Current Facility-Administered Medications   Medication    albuterol inhaler 2 puff    [START ON 3/5/2024] aspirin (ECOTRIN) enteric coated tablet 81 mg    atorvastatin (LIPITOR) tablet 80 mg    carvedilol (COREG) tablet 6.25 mg    hydroCHLOROthiazide tablet 25 mg    melatonin tablet 3 mg    sodium chloride 0.9 % flush bag 25 mL    sodium chloride 0.9 % injection 2 mL    Potassium Standard Replacement Protocol (Levels 3.5 and lower)    Magnesium Standard Replacement Protocol    acetaminophen (TYLENOL) tablet 650 mg    polyethylene glycol (MIRALAX) packet 17 g    sodium chloride 0.9 % flush bag 25 mL     enoxaparin (LOVENOX) injection 40 mg    Potassium Replacement (Levels 3.6 - 4)    amoxicillin-clavulanate (AUGMENTIN) 875-125 MG tablet 1 tablet    [START ON 3/5/2024] azithromycin (ZITHROMAX) 500 mg in sodium chloride 0.9 % 250 mL IVPB    ipratropium-albuterol (DUONEB) 0.5-2.5 (3) MG/3ML nebulizer solution 3 mL    guaiFENesin-DM (ROBITUSSIN DM) 100-10 MG/5ML syrup 10 mL    fluticasone-umeclidin-vilanterol (TRELEGY ELLIPTA) 200-62.5-25 MCG/ACT inhaler 1 puff    [START ON 3/5/2024] predniSONE (DELTASONE) tablet 40 mg    [START ON 3/5/2024] folic acid (FOLATE) tablet 1 mg    [START ON 3/5/2024] thiamine (VITAMIN B1) tablet 100 mg    LORazepam (ATIVAN) tablet 2 mg    Or    LORazepam (ATIVAN) tablet 3 mg    Or    LORazepam (ATIVAN) tablet 4 mg    Or    LORazepam (ATIVAN) injection 2 mg    Or    LORazepam (ATIVAN) injection 3 mg    Or    LORazepam (ATIVAN) injection 4 mg       Past Medical History:   Diagnosis Date    CKD (chronic kidney disease), stage III (CMD)     Closed fracture of two ribs of left side 05/24/2022    COPD (chronic obstructive pulmonary disease) (CMD)     Emphysema of lung (CMD)     Essential (primary) hypertension     High cholesterol     Other pneumothorax 06/06/2018    Pneumothorax     history of    SVT (supraventricular tachycardia)     Tachycardia        Past Surgical History:   Procedure Laterality Date    Cdl cath poss ptca  03/05/2020    Colonoscopy diagnostic  2/9/16    Affi 3yr recall, 6 polyps tubular adenomas    Coronary angioplasty      Skull fracture elevation         Family History   Problem Relation Age of Onset    COPD Mother     Dementia/Alzheimers Father     Cancer Daughter        Social History     Tobacco Use    Smoking status: Some Days     Current packs/day: 0.25     Average packs/day: 0.3 packs/day for 42.0 years (10.5 ttl pk-yrs)     Types: Cigarettes     Passive exposure: Current    Smokeless tobacco: Current   Vaping Use    Vaping Use: never used   Substance Use Topics     Alcohol use: Yes     Alcohol/week: 7.0 standard drinks of alcohol     Types: 7 Shots of liquor per week     Comment: last drank a few days ago    Drug use: Not Currently     Frequency: 2.0 times per week     Types: Cocaine, Marijuana          Review of Systems   Review of Symptoms     Review of Systems   Respiratory:  Positive for shortness of breath.    Gastrointestinal:  Positive for abdominal pain.          Physical Exam   Physical Exam     ED Triage Vitals   ED Triage Vitals Group      Temp 03/04/24 0957 97.5 °F (36.4 °C)      Heart Rate 03/04/24 0957 69      Resp 03/04/24 0957 (!) 24      BP 03/04/24 0957 (!) 147/76      SpO2 03/04/24 0957 92 %      EtCO2 mmHg --       Height --       Weight 03/04/24 1516 196 lb (88.9 kg)      Weight Scale Used --       BMI (Calculated) --       IBW/kg (Calculated) --        Physical Exam         Constitutional:  Alert, cooperative, conversive although abdomen is uncomfortable and he has some mild tachypnea.   Integument:  No rash or lesion.   HEENT:  Sclerae and conjunctivae are normal bilaterally.   Neck:  Trachea is midline.  C-Spine:  FROM.  No posterior midline tenderness, paraspinal tenderness or spasm.   Respiratory:  Mild diffuse nonspecific wheezing.  Cardiovascular:  RRR without rub.  No edema.    Abdomen:  Moderate distention, diffuse abdominal tenderness, nonspecific with no pulsatile mass, no rebound or peritoneal findings no hepatosplenomegaly.   Musculoskeletal:  Upper and lower ext nontender bilaterally, normal ROM, no bony step abnormalities.     Back:  Normal alignment.  No CVA or midline bony tenderness.    Neurologic:  Cranial nerve II-XII grossly intact, no focal weakness or numbness.      Procedures   ED Procedures     Procedures     Lab Results   ED Lab     Results for orders placed or performed during the hospital encounter of 03/04/24   COVID/Flu/RSV panel   Result Value Ref Range    Rapid SARS-COV-2 by PCR Not Detected Not Detected / Detected /  Presumptive Positive / Inhibitors present    Influenza A by PCR Not Detected Not Detected    Influenza B by PCR Not Detected Not Detected    RSV BY PCR Not Detected Not Detected    Isolation Guidelines      Procedural Comment     Comprehensive Metabolic Panel   Result Value Ref Range    Fasting Status      Sodium 141 135 - 145 mmol/L    Potassium 4.5 3.4 - 5.1 mmol/L    Chloride 110 97 - 110 mmol/L    Carbon Dioxide 28 21 - 32 mmol/L    Anion Gap 8 7 - 19 mmol/L    Glucose 106 (H) 70 - 99 mg/dL    BUN 15 6 - 20 mg/dL    Creatinine 1.25 (H) 0.67 - 1.17 mg/dL    Glomerular Filtration Rate 61 >=60    BUN/Cr 12 7 - 25    Calcium 8.9 8.4 - 10.2 mg/dL    Bilirubin, Total 1.4 (H) 0.2 - 1.0 mg/dL    GOT/AST 24 <=37 Units/L    GPT/ALT 30 <64 Units/L    Alkaline Phosphatase 101 45 - 117 Units/L    Albumin 3.9 3.6 - 5.1 g/dL    Protein, Total 7.5 6.4 - 8.2 g/dL    Globulin 3.6 2.0 - 4.0 g/dL    A/G Ratio 1.1 1.0 - 2.4   Lipase   Result Value Ref Range    Lipase 25 15 - 77 Units/L   Magnesium   Result Value Ref Range    Magnesium 2.2 1.7 - 2.4 mg/dL   NT proBNP   Result Value Ref Range    NT-proBNP 151 (H) <=125 pg/mL   TROPONIN I, HIGH SENSITIVITY   Result Value Ref Range    Troponin I, High Sensitivity 12 <77 ng/L   Prothrombin Time (INR/PT)   Result Value Ref Range    Protime- PT 11.3 9.7 - 11.8 sec    INR 1.1     CBC with Automated Differential (performable only)   Result Value Ref Range    WBC 10.4 4.2 - 11.0 K/mcL    RBC 4.81 4.50 - 5.90 mil/mcL    HGB 14.6 13.0 - 17.0 g/dL    HCT 46.0 39.0 - 51.0 %    MCV 95.6 78.0 - 100.0 fl    MCH 30.4 26.0 - 34.0 pg    MCHC 31.7 (L) 32.0 - 36.5 g/dL    RDW-CV 12.2 11.0 - 15.0 %    RDW-SD 43.3 39.0 - 50.0 fL     140 - 450 K/mcL    NRBC 0 <=0 /100 WBC    Neutrophil, Percent 59 %    Lymphocytes, Percent 23 %    Mono, Percent 10 %    Eosinophils, Percent 7 %    Basophils, Percent 1 %    Immature Granulocytes 0 %    Absolute Neutrophils 6.1 1.8 - 7.7 K/mcL    Absolute Lymphocytes  2.4 1.0 - 4.0 K/mcL    Absolute Monocytes 1.0 (H) 0.3 - 0.9 K/mcL    Absolute Eosinophils  0.7 (H) 0.0 - 0.5 K/mcL    Absolute Basophils 0.1 0.0 - 0.3 K/mcL    Absolute Immature Granulocytes 0.0 0.0 - 0.2 K/mcL   Gold Top   Result Value Ref Range    Extra Tube Hold for Add Ons    Procalcitonin   Result Value Ref Range    Procalcitonin <0.05 <=0.09 ng/mL   C Reactive Protein   Result Value Ref Range    C-Reactive Protein <0.3 <=1.0 mg/dL   Urinalysis & Reflex Microscopy With Culture If Indicated   Result Value Ref Range    COLOR, URINALYSIS Straw     APPEARANCE, URINALYSIS Clear     GLUCOSE, URINALYSIS Negative Negative mg/dL    BILIRUBIN, URINALYSIS Negative Negative    KETONES, URINALYSIS Negative Negative mg/dL    SPECIFIC GRAVITY, URINALYSIS >1.030 (H) 1.005 - 1.030    OCCULT BLOOD, URINALYSIS Negative Negative    PH, URINALYSIS 5.5 5.0 - 7.0    PROTEIN, URINALYSIS 30 (A) Negative mg/dL    UROBILINOGEN, URINALYSIS 0.2 0.2, 1.0 mg/dL    NITRITE, URINALYSIS Negative Negative    LEUKOCYTE ESTERASE, URINALYSIS Negative Negative    SQUAMOUS EPITHELIAL, URINALYSIS 1 to 5 None Seen, 1 to 5 /hpf    ERYTHROCYTES, URINALYSIS 3 to 5 (A) None Seen, 1 to 2 /hpf    LEUKOCYTES, URINALYSIS 6 to 10 (A) None Seen, 1 to 5 /hpf    BACTERIA, URINALYSIS None Seen None Seen /hpf    HYALINE CASTS, URINALYSIS None Seen None Seen, 1 to 5 /lpf    MUCUS Present    Drug Abuse Screen, Urine   Result Value Ref Range    Amphetamines, Urine Negative Negative    Barbiturates, Urine Negative Negative    Benzodiazepines, Urine Negative Negative    Cocaine/ Metabolite, Urine Negative Negative    Opiates, Urine Negative Negative    Phencyclidine, Urine Negative Negative    Cannabinoids, Urine Negative Negative    Fentanyl, Urine Screen Negative Negative   Protein/Creatinine Ratio, Urine   Result Value Ref Range    Protein, Urine 32 (H) <12 mg/dL    Creatinine, Urine 161.00 mg/dL    Protein/ Creatinine Ratio 199 <=199 mgPR/gCR   ISTAT8 VENOUS  POINT  OF CARE   Result Value Ref Range    BUN - POINT OF CARE 17 6 - 20 mg/dL    SODIUM - POINT OF CARE 142 135 - 145 mmol/L    POTASSIUM - POINT OF CARE 4.1 3.4 - 5.1 mmol/L    CHLORIDE - POINT OF CARE 105 97 - 110 mmol/L    TCO2 - POINT OF CARE 28 (H) 19 - 24 mmol/L    ANION GAP - POINT OF CARE 14 7 - 19 mmol/L    HEMATOCRIT - POINT OF CARE 48.0 39.0 - 51.0 %    HEMOGLOBIN - POINT OF CARE 16.3 13.0 - 17.0 g/dL    GLUCOSE - POINT OF CARE 111 (H) 70 - 99 mg/dL    CALCIUM, IONIZED - POINT OF CARE 1.26 1.15 - 1.29 mmol/L    Creatinine 1.30 (H) 0.67 - 1.17 mg/dL    Glomerular Filtration Rate 58 (L) >=60   BLOOD GAS, VENOUS -POINT OF CARE   Result Value Ref Range    PH, VENOUS - POINT OF CARE 7.25 (L) 7.35 - 7.45 Units    PCO2, VENOUS - POINT OF CARE 55 (H) 41 - 54 mm Hg    PO2, VENOUS - POINT OF CARE 37 20 - 65 mm Hg    HCO3, VENOUS - POINT OF CARE 24 22 - 28 mmol/L    BASE EXCESS / DEFICIT, VENOUS - POINT OF CARE -4 (L) -2 - 2 mmol/L    O2 SATURATION, VENOUS - POINT OF CARE 61 23 - 93 %   LACTIC ACID VENOUS WITH REFLEX - POINT OF CARE   Result Value Ref Range    LACTIC ACID, VENOUS - POINT OF CARE 2.1 (HH) <2.0 mmol/L          EKG     EKG Interpretation  Rate:  Sixty-seven  Rhythm:  Sinus rhythm with short SD   Abnormality:  No significant ST elevation or depression    EKG interpreted by the emergency department physician    Radiology Results    ED Radiology Results     Imaging Results              CTA ABDOMEN PELVIS (Final result)  Result time 03/04/24 13:22:21      Final result                   Impression:    IMPRESSION:    1. Stable appearance of an aortoiliac stent graft, which appears patent.  There is stable appearance of an excluded left common iliac artery  aneurysm. Other stable vascular findings, as described above.    2. Colonic diverticulosis identified with a minimal amount of fat stranding  identified at the level of the distal descending colon/proximal sigmoid  colon. This raises the possibility for an early  diverticulitis. Correlate  clinically and with patient's lab values.    3. Stable appearance of 5 mm area of rounded enhancement within the  subcapsular region of the posterior segment right lobe of the liver, as  described above. This may represent a small vascular malformation.    4. Other stable chronic findings, as described above.    I, Attending Radiologist Jerod Luque MD, have reviewed the images and  report and concur with these findings interpreted by Resident Radiologist,  Alcon Shipley MD.    Electronically Signed by: Jerod Luque MD  Signed on: 3/4/2024 1:22 PM  Created on Workstation ID: [id number]  Signed on Workstation ID: [id number]               Narrative:    CTA ABDOMEN PELVIS    HISTORY: hx AAA repair now w abd pain diffuse    COMPARISON: Prior studies with most recent performed on 1/18/2024.    TECHNIQUE: Non-gated CT angiogram of the abdomen, and pelvis was performed  with and without contrast with triplanar, 3D, and MIP reconstructions. 100  mL Omnipaque 350 was administered.    FINDINGS:     Precontrast sequences do not demonstrate any evidence for hyperdense vessel  wall, to suggest intramural hematoma.    AORTA:  Abdominal aorta: Redemonstrated aortoiliac graft without evidence of  complication. Stable excluded left common iliac artery aneurysm.    Visceral vessels: Redemonstrated chronic occlusion of the partially imaged  left superficial femoral artery stent. Chronic occlusion of the right  superficial femoral artery. Stable appearance of mild narrowing within the  distal aspect of the superior mesenteric artery. Stable appearance of short  segment 50% stenosis of the single left renal artery. Stable appearance of  approximately less than 50% stenosis of the left external iliac artery.    Non Angiographic Findings:    Support Devices: None.    LOWER CHEST:  Lungs: Stable appearance of severe emphysematous changes in the visualized  lungs.    ABDOMEN/PELVIS  Limited by arterial phase  acquisition.  Liver: 5 mm rounded area of contrast enhance identified in the subcapsular  location within the posterior segment right lobe of the liver (series 301  image 55). This is unchanged in appearance dating back to 2/2/2023. This  may represent a small vascular malformation. The liver is otherwise  unremarkable in appearance.  Biliary: Normal.  Gallbladder: Normal.  Pancreas: Normal.  Spleen: Heterogeneous appearance of the spleen, likely due to timing of  contrast material.  Kidneys: Stable appearance of right renal cysts. No enhancing renal masses  bilaterally. No calculi. No obstructive changes.  Adrenals: Mild nonspecific thickening of bilateral adrenal glands. No mass  lesions identified.  Bowel: Diverticulosis with minimal fat stranding along the at the level of  the distal descending colon/proximal sigmoid colon (301/129, 108/41). This  raises the possibility for an early diverticulitis. Normal appendix.  Mesentery/retroperitoneum: Normal.    Pelvis: Stable mildly prominent prostate gland. Prostatic calcifications  noted.    BONES/SOFT TISSUES: No suspicious lesion. Multilevel degenerative changes  of the lumbar spine. Stable chronic osseous findings. Tiny fat-containing  umbilical hernia.                                       XR CHEST AP OR PA (Final result)  Result time 03/04/24 11:03:28      Final result                   Impression:    IMPRESSION:     1.   No pneumonia suspicious findings.  2.   Similar background COPD/bullous change.    I, Attending Radiologist Rodríguez Feliz, have reviewed the images and report  and concur with these findings interpreted by Resident Radiologist, Alcon Shipley MD.    Electronically Signed by: Rodríguez Feliz  Signed on: 3/4/2024 11:03 AM  Created on Workstation ID: [id number]  Signed on Workstation ID: [id number]               Narrative:    XR CHEST AP OR PA    HISTORY:  sob eval pna    COMPARISON: Chest radiograph 1/18/2024. CTA chest abdomen and  pelvis  1/18/2024.    TECHNIQUE:  Single, AP upright view of the chest.    FINDINGS:      The cardiomediastinal contour and pulmonary vasculature are within normal  limits. Bibasilar strandy opacities, likely representing atelectasis and  scarring. No definite pleural effusion. No pneumothorax. Redemonstrated  bilateral emphysema.    The visualized osseous structures demonstrate no acute abnormality.                                            ED Medications   ED Medications     Medications   albuterol inhaler 2 puff (has no administration in time range)   aspirin (ECOTRIN) enteric coated tablet 81 mg (has no administration in time range)   atorvastatin (LIPITOR) tablet 80 mg (has no administration in time range)   carvedilol (COREG) tablet 6.25 mg (has no administration in time range)   hydroCHLOROthiazide tablet 25 mg (has no administration in time range)   melatonin tablet 3 mg (has no administration in time range)   sodium chloride 0.9 % flush bag 25 mL (has no administration in time range)   sodium chloride 0.9 % injection 2 mL (has no administration in time range)   Potassium Standard Replacement Protocol (Levels 3.5 and lower) (has no administration in time range)   Magnesium Standard Replacement Protocol (has no administration in time range)   acetaminophen (TYLENOL) tablet 650 mg (has no administration in time range)   polyethylene glycol (MIRALAX) packet 17 g (has no administration in time range)   sodium chloride 0.9 % flush bag 25 mL (has no administration in time range)   enoxaparin (LOVENOX) injection 40 mg (has no administration in time range)   Potassium Replacement (Levels 3.6 - 4) (has no administration in time range)   amoxicillin-clavulanate (AUGMENTIN) 875-125 MG tablet 1 tablet (has no administration in time range)   azithromycin (ZITHROMAX) 500 mg in sodium chloride 0.9 % 250 mL IVPB (has no administration in time range)   ipratropium-albuterol (DUONEB) 0.5-2.5 (3) MG/3ML nebulizer solution 3  mL (has no administration in time range)   guaiFENesin-DM (ROBITUSSIN DM) 100-10 MG/5ML syrup 10 mL (has no administration in time range)   fluticasone-umeclidin-vilanterol (TRELEGY ELLIPTA) 200-62.5-25 MCG/ACT inhaler 1 puff (has no administration in time range)   predniSONE (DELTASONE) tablet 40 mg (has no administration in time range)   folic acid (FOLATE) tablet 1 mg (has no administration in time range)   thiamine (VITAMIN B1) tablet 100 mg (has no administration in time range)   LORazepam (ATIVAN) tablet 2 mg (has no administration in time range)     Or   LORazepam (ATIVAN) tablet 3 mg (has no administration in time range)     Or   LORazepam (ATIVAN) tablet 4 mg (has no administration in time range)     Or   LORazepam (ATIVAN) injection 2 mg (has no administration in time range)     Or   LORazepam (ATIVAN) injection 3 mg (has no administration in time range)     Or   LORazepam (ATIVAN) injection 4 mg (has no administration in time range)   ipratropium-albuterol (DUONEB) 0.5-2.5 (3) MG/3ML nebulizer solution 3 mL (3 mLs Nebulization Given 3/4/24 1030)   iohexol (OMNIPAQUE 350 INJECT) contrast solution 100 mL (100 mLs Intravenous Given 3/4/24 1239)   sodium chloride 0.9 % injector flush 50 mL (50 mLs Injection Given 3/4/24 1239)   methylPREDNISolone (SOLU-Medrol) injection 60 mg (60 mg Intravenous Given 3/4/24 1319)   ipratropium-albuterol (DUONEB) 0.5-2.5 (3) MG/3ML nebulizer solution 3 mL (3 mLs Nebulization Given 3/4/24 1412)   amoxicillin-clavulanate (AUGMENTIN) 875-125 MG tablet 1 tablet (1 tablet Oral Given 3/4/24 1510)   azithromycin (ZITHROMAX) 500 mg in sodium chloride 0.9 % 250 mL IVPB (0 mg Intravenous Completed 3/4/24 1650)   sodium chloride (NORMAL SALINE) 0.9 % bolus 500 mL (0 mLs Intravenous Completed 3/4/24 1604)   methylPREDNISolone (SOLU-Medrol) injection 80 mg (80 mg Intravenous Given 3/4/24 1815)          ED Course     Vitals:    03/04/24 1700 03/04/24 1730 03/04/24 1800 03/04/24 1838   BP:  (!) 151/61 (!) 149/64 (!) 152/65 (!) 152/78   Pulse: 61 (!) 60 (!) 58 66   Resp: 20 (!) 21 20 20   Temp:    97.3 °F (36.3 °C)   TempSrc:       SpO2: 97% 99% 97% 98%   Weight:    89.3 kg (196 lb 12.8 oz)            Radiology Review: I have independently interpreted the Chest X-Ray and have found No acute or active disease.  I am awaiting on the final radiology read.          Consults                    Medical Decision Making                           Patient is a 75 year old with complaint of COPD exacerbation that he will be treated for but his chief complaint is actually abdominal pain.  It is quite diffuse and nonspecific and he will require CT abdomen pelvis.  My differential diagnosis includes but is not limited to pneumonia, viral or bacterial, but in regards to abdominal pain concern for obstruction with distended abdomen, constipation, acute infectious process and what seems lower likelihood with diffuse nature of pain pathology related to AAA that was repaired in the past. The patient will require admission.     CT imaging shows only question of diverticulitis.  Unfortunately despite 2 DuoNebs, Solu-Medrol we ambulated patient and he has a new oxygen require it min of 3 L and was still 86%.  He requires admission for COPD exacerbation and I endorsed the patient to the admitting physician.  Azithromycin given per request of admitting physician, and I do not have concern for sepsis currently.             Critical Care       No Critical Care        Disposition       Clinical Impression and Diagnosis  3:40 PM 03/04/24     Diagnosis:   1. COPD exacerbation (CMD)                   Admit 3/4/2024  2:56 PM  Telemetry Bed?: No  Admitting Physician: NANCY GONZALES [[id number]]  Transferring Patient to? Only adjust for transfers between [facility] ([facility], [facility], [facility]). **PLEASE ONLY USE BUTTON OPTIONS**: [facility] [[id number]]  Is this a telephone or verbal order?: This is a telephone order from the  admitting physician             Kelvin Frias MD  03/04/24 1548       Kelvin Frias MD  03/04/24 8054     Pb Woodard(Attending)

## 2024-03-08 NOTE — ASU DISCHARGE PLAN (ADULT/PEDIATRIC) - PROCEDURE
normal appearance , without tenderness upon palpation , no deformities , trachea midline , Thyroid normal size , no masses , thyroid nontender Cardiac catheterization [History reviewed] : History reviewed. [Medications and Allergies reviewed] : Medications and allergies reviewed.

## 2024-03-19 NOTE — OCCUPATIONAL THERAPY INITIAL EVALUATION ADULT - VISUAL ASSESSMENT: TRACKING
Apply 1-2 drop(s) of Polytrim into the affected eye(s) 4 times daily for the next 7 days.  Apply a warm compress as needed to clearing matting and drainage from the eye  Be sure to wash hands frequently with warm soap and water, before and after coming in contact with the eye area. Bacterial conjunctivitis is very contagious.  May apply natural tears drops as needed for dryness or irritation.  Go to ER or follow up ASAP with your eye care specialist if you develop new or worsening symptoms such as increasing pain or swelling, high fevers despite NSAID/Tylenol use, or vision changes.     Sore throat management:  Start Flonase 2 sprays up each nostril, 1 hour before bed.   Start Daily Zyrtec.    You may take alternating ibuprofen and Tylenol every 4-6 hours as needed for any fevers or discomfort.     For example: If you take ibuprofen at 7:00 a.m. and Tylenol at 9:00 a.m., you may again take ibuprofen at 11:00 a.m. and Tylenol at 1:00 p.m.   Do not exceed more than 4000 mg of Tylenol or 3200 mg of ibuprofen in a  24 hour period.    You may try over-the-counter Chloraseptic spray, throat lozenges, hard candies, a tsp of honey, and warm salt water gargles.    Try cold or soft foods until symptoms resolve.  This includes avoiding hard or crunchy foods such as chips or popcorn.  Follow up with PCP in 1 week if not improving.  Go to the ER with new or worsening symptoms such as difficulty swallowing your own saliva, high fevers, or difficulty breathing.      
normal

## 2024-03-25 RX ORDER — ROSUVASTATIN CALCIUM 5 MG/1
5 TABLET, FILM COATED ORAL
Qty: 90 | Refills: 3 | Status: ACTIVE | COMMUNITY
Start: 2022-06-29 | End: 1900-01-01

## 2024-04-08 ENCOUNTER — RESULT REVIEW (OUTPATIENT)
Age: 69
End: 2024-04-08

## 2024-04-08 ENCOUNTER — OUTPATIENT (OUTPATIENT)
Dept: OUTPATIENT SERVICES | Facility: HOSPITAL | Age: 69
LOS: 1 days | End: 2024-04-08
Payer: MEDICARE

## 2024-04-08 ENCOUNTER — APPOINTMENT (OUTPATIENT)
Dept: HEART FAILURE | Facility: CLINIC | Age: 69
End: 2024-04-08
Payer: MEDICARE

## 2024-04-08 ENCOUNTER — APPOINTMENT (OUTPATIENT)
Dept: CV DIAGNOSITCS | Facility: HOSPITAL | Age: 69
End: 2024-04-08

## 2024-04-08 VITALS
HEIGHT: 70 IN | TEMPERATURE: 98.2 F | DIASTOLIC BLOOD PRESSURE: 88 MMHG | SYSTOLIC BLOOD PRESSURE: 126 MMHG | OXYGEN SATURATION: 97 % | WEIGHT: 152 LBS | BODY MASS INDEX: 21.76 KG/M2 | HEART RATE: 108 BPM

## 2024-04-08 DIAGNOSIS — Z94.1 HEART TRANSPLANT STATUS: ICD-10-CM

## 2024-04-08 DIAGNOSIS — Z95.0 PRESENCE OF CARDIAC PACEMAKER: Chronic | ICD-10-CM

## 2024-04-08 DIAGNOSIS — Z98.890 OTHER SPECIFIED POSTPROCEDURAL STATES: Chronic | ICD-10-CM

## 2024-04-08 PROCEDURE — 76376 3D RENDER W/INTRP POSTPROCES: CPT | Mod: 26

## 2024-04-08 PROCEDURE — 93356 MYOCRD STRAIN IMG SPCKL TRCK: CPT

## 2024-04-08 PROCEDURE — 93306 TTE W/DOPPLER COMPLETE: CPT | Mod: 26

## 2024-04-08 PROCEDURE — 93306 TTE W/DOPPLER COMPLETE: CPT

## 2024-04-08 PROCEDURE — 99214 OFFICE O/P EST MOD 30 MIN: CPT

## 2024-04-08 PROCEDURE — 76376 3D RENDER W/INTRP POSTPROCES: CPT

## 2024-04-08 RX ORDER — BENZONATATE 100 MG/1
100 CAPSULE ORAL
Qty: 30 | Refills: 0 | Status: COMPLETED | COMMUNITY
Start: 2024-01-03 | End: 2024-04-08

## 2024-04-09 LAB
ALBUMIN SERPL ELPH-MCNC: 4.8 G/DL
ALP BLD-CCNC: 106 U/L
ALT SERPL-CCNC: 19 U/L
ANION GAP SERPL CALC-SCNC: 13 MMOL/L
AST SERPL-CCNC: 24 U/L
BASOPHILS # BLD AUTO: 0.06 K/UL
BASOPHILS NFR BLD AUTO: 0.8 %
BILIRUB SERPL-MCNC: 0.8 MG/DL
BUN SERPL-MCNC: 20 MG/DL
CALCIUM SERPL-MCNC: 9.6 MG/DL
CHLORIDE SERPL-SCNC: 104 MMOL/L
CO2 SERPL-SCNC: 22 MMOL/L
CREAT SERPL-MCNC: 1.12 MG/DL
EGFR: 72 ML/MIN/1.73M2
EOSINOPHIL # BLD AUTO: 0.29 K/UL
EOSINOPHIL NFR BLD AUTO: 3.9 %
GLUCOSE SERPL-MCNC: 128 MG/DL
HCT VFR BLD CALC: 49.6 %
HGB BLD-MCNC: 16.4 G/DL
IMM GRANULOCYTES NFR BLD AUTO: 0.3 %
LYMPHOCYTES # BLD AUTO: 2.85 K/UL
LYMPHOCYTES NFR BLD AUTO: 38.7 %
MAGNESIUM SERPL-MCNC: 1.8 MG/DL
MAN DIFF?: NORMAL
MCHC RBC-ENTMCNC: 28.2 PG
MCHC RBC-ENTMCNC: 33.1 GM/DL
MCV RBC AUTO: 85.2 FL
MONOCYTES # BLD AUTO: 0.49 K/UL
MONOCYTES NFR BLD AUTO: 6.7 %
NEUTROPHILS # BLD AUTO: 3.65 K/UL
NEUTROPHILS NFR BLD AUTO: 49.6 %
PLATELET # BLD AUTO: 227 K/UL
POTASSIUM SERPL-SCNC: 4.6 MMOL/L
PROT SERPL-MCNC: 7 G/DL
RBC # BLD: 5.82 M/UL
RBC # FLD: 14 %
SODIUM SERPL-SCNC: 139 MMOL/L
TACROLIMUS SERPL-MCNC: 7 NG/ML
WBC # FLD AUTO: 7.36 K/UL

## 2024-04-11 NOTE — HISTORY OF PRESENT ILLNESS
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  Pt initially presented to [facility] 5/20 with near syncope in setting of worsening HF symptoms and found to have 41 episodes of VT, many terminating with ATP. LHC did not reveal new obstructive CAD and he underwent EPS which did not reveal endocardial substrate amenable for ablation. RHC revealed severely depressed cardiac output and he was transferred to [facility] 5/26 for advanced therapies evaluation. IABP was placed and he was listed UNOS status 2e for OHT. He was briefly made status 7 as he became febrile without leukocytosis, then bld cx negative x48hrs he was re-activated UNOS status 2e.   A suitable donor was identified and patient underwent OHT with Dr. Earl/Maria C on 6/21/22 (ischemic time 261 mins, CMV +/+, Toxo -/-). Patient was successfully extubated and IABP was removed on POD 1.  Cultures from the ICD site in the OR were positive for staph epidermidis/morganella morganii and he was treated with IV Vancomycin and Cefepime. CT scan demonstrated residual fibrous capsule in the region of the previous cardiac device in the left chest wall. Pt was then discharged home on oral antibiotics on 7/8/22.  Pt presented 6/28/23 for 1st annual RHC/EMBx/LHC, found to have asymptomatic ISHLT Grade 2R/3A rejection despite therapeutic Tacro level at time of biopsy (though prior levels had been variable, been difficult to maintain within goal range 8-10), normal heartcare one week prior, and normal graft function. He was treated with pulse oral steroids, cellcept was increased from 500mg to 750mg BID (then to 1G BID on 7/5), and he was briefly restarted on valcyte and nystatin ppx. Repeat biopsy 7/13 showed ISHLT Grade 1R/2. Additionally, pt had been reporting intermittent loose BMs, and GI PCR was positive for norovirus and supportive care was recommended. He initially reported improvement in loose stool, however by end of August he was experiencing 1-5 episodes daily. Stool studies were repeated 9/7, GI PCR sample was inadequate though C diff was negative. CMV PCR was low level detectable. On 9/13 patient was admitted for fever, dry cough, generalized malaise and decreased appetite; his loose stools had reportedly resolved. Pt underwent infectious work-up (cultures, RVP, CMV PCR) and Cellcept was briefly reduced to 500mg BID. RVP was positive for coronavirus (non covid), CMV persisted low level detectable. He also had return of loose stool, for which GI was consulted for outpatient follow-up and management. Cellcept was increased to 1G BID and patient was discharged home 9/16/23. Most recent CMV PCR 9/28 showed CMV not detected. Since hospital discharge, pt followed up with GI 10/3. He continued to report loose BMs; he was started on Lactaid tablets and Creon with meals, with no improvement in diarrhea. In 10/2023 decision was made to switch pt to Envarsus and reduce Cellcept tp 500mg BID) and diarrhea resolved.   On 12/27/23 pt called to report temp 102, body aches, cough and congestion found to be COVID positive. He was treated with a 5-day course of remdesivir d/t lower respiratory tract symptoms (completed 1/1/24). CT Chest showed GGO c/w viral infection. Cellcept was discontinued upon admission and he was discharged on Envarsus monotherapy.   Pt is here today for f/u clinic visit 20 months post heart transplant. He reports feeling well. He sometimes feels SOB when he wakes up in the am but throughout the day no SOB. Walks daily 45 minutes and active around the home.  He still has a lingering dry cough and occasional HA. Otherwise denies fever/chills, CP, SOB, HSIEH, dizziness/lightheadedness, N/V, or diarrhea. He reports his appetite remains good. Did not bring home vital signs logs but reports BP at baseline 100-110/70-80s. FS well-controlled. He reports 100% medication compliance.

## 2024-04-11 NOTE — CARDIOLOGY SUMMARY
[de-identified] : 6/28/23: ECG 97bpm, RBBB, when compared to EKG 3/15/23 no significant changes 03/15/23: ECG SR 98bpm, RBBB, when compared to EKG 10/20/22 no significant changes 10/20/22: ECG NSR 88bpm, RBBB, when compared to EKG 9/22/22 no significant changes 9/22/22 ECG: , RBBB,when compared to EKG 8/2/22 no significant changes 8/2/22 ECG: NSR 95bpm, RBBB, when compared to EKG 7/20/22 no significant changes 7/20/22 ECG: NSR at 100bpm RBBB 7/13/22 ECG: NSR at 94 bpm, RBBB. [de-identified] : 4/8/24: TTE CONCLUSIONS:  1. Left ventricular cavity is small. Left ventricular wall thickness is normal. Left ventricular systolic function is normal with an ejection fraction of 57 % by 3D. There are no regional wall motion abnormalities seen.  2. Normal left ventricular diastolic function, with normal filling pressure.  3. Left ventricular global longitudinal strain is -17.3 % is borderline (range: -18 to -16%). Images were acquired on a [employer] ultrasound system and processed using [employer] strain analysis software with a heart rate of 101 bpm and a blood pressure of 126/80 mmHg.  4. Mildly enlarged right ventricular cavity size, with normal wall thickness, and normal systolic function.  5. Right ventricular free wall strain is --11 %.  6. 3D RVEF 46.7%.  7. No pericardial effusion seen.  8. Compared to the transthoracic echocardiogram performed on 1/8/2024, there have been no significant interval changes.  1/8/24 TTE: CONCLUSIONS:  1. Left ventricular systolic function is normal with an ejection fraction of 55 % by 3D.  2. Normal left ventricular diastolic function.  3. Normal right ventricular cavity size, wall thickness, and borderline reduced systolic function.  4. No pericardial effusion seen.  5. Compared to the transthoracic echocardiogram performed on 10/4/2023 there have been no significant interval changes.  6/23/23: TTE: EF 55-60% LVIDd 4.3cm, GLS is 19.8%, borderline reduced RVSF, mild-mod TR, compared to TTE 6/14/23 there have been no signifiant interval changes 3/15/23 TTE EF 67% LVIDd 4.2cm, LVIDd 4.2cm, normal LVSF, normal RV function with decreased RVSF, mild TR, no pericardial effusion. Compared to TTE 1/4/23, RV size has improved, there is no change in RV function. 1/4/23 TTE EF 64% LVIDd 4.5cm, normal LVSF, Global strain -20%, decreased RV systolic function, no valvular abnormalities, no pericardial effusion. Copmared to TTE 11/30/22, no significant changes noted.  11/30/22 TTE EF 58% LVIDd 2.6, normal LFSV, decreased RV systolic function, RV wall strain -12.1%, no valvular abnormalities, no pericardial effusion 10/20/22 TTE limited: normal graft function. No pericardial effusion seen.  TTE 8/2/22: limited study in the setting of an RV biopsy 1. Normal right ventricular size and function. 2. Small pericardiall effusion. A bioptome is noted in the right heart. No change to the size of the pericardial effusion or RV function 7/20/22: TTE: limited study: hyperdynamic LVSF, normal RV size and function, normal pericardium with trace to small pericardial effusion adjacent to RV 07/05/22 TTE: limited study: normal biV function, small loculated pericardial effusion at the LV apex, measuring approx 1.0 cm seen both pre and post images. [de-identified] : 7/13/23: RA 2, RV 23/2, PA 26/15/218, PCWP 5, PA sat 72%, CO/CI 4.5/2.4, ISHLT Grade 1R/2 6/28/23: RA 2, RV 27/1, PA 28/12/20, PCWP 7, PA sat 68%, CO/CI 4.7/2.5, ISHLT Grade 2R/3A. LHC Normal coronaries.  6/28/22: RA 9, RV 3/11, PA 62/27/41, wedge 21 with v wave 31, PA sat 69.3%, /81/102, CO/Ci 6.87/3.62, grade 1R/2 7/5: RA 2, RV 35/3, PA 36/18/26, Wedge 15, PA sat 71.7, Kristy CO/CI 5.4/2.8, /75/93, SVR 1345 dsc, 1R/1A 7/13: RA 3, PAP 36/14/24 PCW 8, Pa sat 71%, Kristy CO/CI 6.1/3.2, EMBx ISHLT Grade 1R/1A 7/20: RA 3 PAP 47/20/30 PCW 15 PaSat 67% Kristy CO/CI 5.9/3.2 EMBx ISHLT Grade 1R/1A 8/2: RA 4, PAP 35/16/25 PCW 12 Pasat 68% Kristy CO/CI 5.3/2.9, EMBx ISHLT Grade 1R/1A 8/16: RA 5, PAP 42/19/27 PCW 15 Pasat 72% Kristy CO/CI 5.6/3.0, EMBx ISHLT Grade 0R 9/22: RA 5 PAP 41/18/28 PCW 16 PaSat 75% Kristy CO/CI 5.8/3.1, EMBx ISHLT Grade 1R/1A 10/20: RA 10/20/22 RA 2 RV 32/2 PA 33/9/21 PCWP 9 Kristy CO/CI 6.09/3.32 ACR 1R/1A, pAMR 0 6/28/23: LHC w/ IVUS, No disease. RHC RA 2, PAP 28/12/20, PCWP 7, PaSat 68%, Kristy CO/CI 4.7/2.57.  EMBx = ISHLT Grade 2R/3A

## 2024-04-11 NOTE — PHYSICAL EXAM
[No Xanthelasma] : no xanthelasma [Normal Venous Pressure] : normal venous pressure [Normal Radial B/L] : normal radial B/L [Normal] : alert and oriented, normal memory [de-identified] : no perioral cyanosis, MMM [No Edema] : no edema [de-identified] : JVP < 6 cm H2O, no HJR

## 2024-05-23 LAB
ALBUMIN SERPL ELPH-MCNC: 4.8 G/DL
ALP BLD-CCNC: 114 U/L
ALT SERPL-CCNC: 19 U/L
ANION GAP SERPL CALC-SCNC: 12 MMOL/L
AST SERPL-CCNC: 18 U/L
BASOPHILS # BLD AUTO: 0.07 K/UL
BASOPHILS NFR BLD AUTO: 0.9 %
BILIRUB SERPL-MCNC: 0.6 MG/DL
BUN SERPL-MCNC: 23 MG/DL
CALCIUM SERPL-MCNC: 10.2 MG/DL
CHLORIDE SERPL-SCNC: 104 MMOL/L
CHOLEST SERPL-MCNC: 158 MG/DL
CK SERPL-CCNC: 179 U/L
CO2 SERPL-SCNC: 23 MMOL/L
CREAT SERPL-MCNC: 1.13 MG/DL
EGFR: 72 ML/MIN/1.73M2
EOSINOPHIL # BLD AUTO: 0.29 K/UL
EOSINOPHIL NFR BLD AUTO: 3.9 %
ESTIMATED AVERAGE GLUCOSE: 128 MG/DL
GLUCOSE SERPL-MCNC: 117 MG/DL
HBA1C MFR BLD HPLC: 6.1 %
HCT VFR BLD CALC: 50.5 %
HDLC SERPL-MCNC: 40 MG/DL
HGB BLD-MCNC: 16.6 G/DL
IMM GRANULOCYTES NFR BLD AUTO: 0.1 %
LDLC SERPL CALC-MCNC: 92 MG/DL
LYMPHOCYTES # BLD AUTO: 2.68 K/UL
LYMPHOCYTES NFR BLD AUTO: 35.7 %
MAGNESIUM SERPL-MCNC: 2.1 MG/DL
MAN DIFF?: NORMAL
MCHC RBC-ENTMCNC: 27.7 PG
MCHC RBC-ENTMCNC: 32.9 GM/DL
MCV RBC AUTO: 84.3 FL
MONOCYTES # BLD AUTO: 0.45 K/UL
MONOCYTES NFR BLD AUTO: 6 %
NEUTROPHILS # BLD AUTO: 4.01 K/UL
NEUTROPHILS NFR BLD AUTO: 53.4 %
NONHDLC SERPL-MCNC: 118 MG/DL
PLATELET # BLD AUTO: 259 K/UL
POTASSIUM SERPL-SCNC: 4.7 MMOL/L
PROT SERPL-MCNC: 7.6 G/DL
PSA SERPL-MCNC: 0.79 NG/ML
RBC # BLD: 5.99 M/UL
RBC # FLD: 13.6 %
SODIUM SERPL-SCNC: 139 MMOL/L
TACROLIMUS SERPL-MCNC: 3.8 NG/ML
TRIGL SERPL-MCNC: 152 MG/DL
WBC # FLD AUTO: 7.51 K/UL

## 2024-05-31 ENCOUNTER — NON-APPOINTMENT (OUTPATIENT)
Age: 69
End: 2024-05-31

## 2024-05-31 ENCOUNTER — APPOINTMENT (OUTPATIENT)
Dept: HEART FAILURE | Facility: CLINIC | Age: 69
End: 2024-05-31
Payer: MEDICARE

## 2024-05-31 VITALS
BODY MASS INDEX: 21.33 KG/M2 | DIASTOLIC BLOOD PRESSURE: 82 MMHG | HEART RATE: 85 BPM | OXYGEN SATURATION: 99 % | HEIGHT: 70 IN | SYSTOLIC BLOOD PRESSURE: 122 MMHG | WEIGHT: 149 LBS

## 2024-05-31 DIAGNOSIS — E87.5 HYPERKALEMIA: ICD-10-CM

## 2024-05-31 DIAGNOSIS — I27.20 PULMONARY HYPERTENSION, UNSPECIFIED: ICD-10-CM

## 2024-05-31 DIAGNOSIS — N18.2 CHRONIC KIDNEY DISEASE, STAGE 2 (MILD): ICD-10-CM

## 2024-05-31 DIAGNOSIS — T86.21 HEART TRANSPLANT REJECTION: ICD-10-CM

## 2024-05-31 DIAGNOSIS — D84.9 IMMUNODEFICIENCY, UNSPECIFIED: ICD-10-CM

## 2024-05-31 LAB
ALBUMIN SERPL ELPH-MCNC: 4.9 G/DL
ALP BLD-CCNC: 113 U/L
ALT SERPL-CCNC: 20 U/L
ANION GAP SERPL CALC-SCNC: 13 MMOL/L
AST SERPL-CCNC: 40 U/L
BASOPHILS # BLD AUTO: 0.08 K/UL
BASOPHILS NFR BLD AUTO: 0.8 %
BILIRUB SERPL-MCNC: 0.6 MG/DL
BUN SERPL-MCNC: 21 MG/DL
CALCIUM SERPL-MCNC: 9.9 MG/DL
CHLORIDE SERPL-SCNC: 99 MMOL/L
CMV DNA SPEC QL NAA+PROBE: NOT DETECTED IU/ML
CMVPCR LOG: NOT DETECTED LOG10IU/ML
CO2 SERPL-SCNC: 23 MMOL/L
CREAT SERPL-MCNC: 1.16 MG/DL
EGFR: 69 ML/MIN/1.73M2
EOSINOPHIL # BLD AUTO: 0.39 K/UL
EOSINOPHIL NFR BLD AUTO: 4.1 %
GLUCOSE SERPL-MCNC: 107 MG/DL
HCT VFR BLD CALC: 51.8 %
HGB BLD-MCNC: 17.2 G/DL
IMM GRANULOCYTES NFR BLD AUTO: 0.2 %
LYMPHOCYTES # BLD AUTO: 3.04 K/UL
LYMPHOCYTES NFR BLD AUTO: 32.3 %
MAGNESIUM SERPL-MCNC: 2.2 MG/DL
MAN DIFF?: NORMAL
MCHC RBC-ENTMCNC: 27.9 PG
MCHC RBC-ENTMCNC: 33.2 GM/DL
MCV RBC AUTO: 84.1 FL
MONOCYTES # BLD AUTO: 0.61 K/UL
MONOCYTES NFR BLD AUTO: 6.5 %
NEUTROPHILS # BLD AUTO: 5.28 K/UL
NEUTROPHILS NFR BLD AUTO: 56.1 %
PLATELET # BLD AUTO: 258 K/UL
POTASSIUM SERPL-SCNC: 6.6 MMOL/L
PROT SERPL-MCNC: 8.1 G/DL
RBC # BLD: 6.16 M/UL
RBC # FLD: 14.3 %
SODIUM SERPL-SCNC: 135 MMOL/L
TACROLIMUS SERPL-MCNC: 5.3 NG/ML
WBC # FLD AUTO: 9.42 K/UL

## 2024-05-31 PROCEDURE — 99215 OFFICE O/P EST HI 40 MIN: CPT | Mod: 25

## 2024-05-31 PROCEDURE — 93000 ELECTROCARDIOGRAM COMPLETE: CPT

## 2024-05-31 RX ORDER — DAPAGLIFLOZIN 5 MG/1
5 TABLET, FILM COATED ORAL
Qty: 36 | Refills: 3 | Status: ACTIVE | COMMUNITY
Start: 2023-03-27

## 2024-05-31 RX ORDER — MAGNESIUM OXIDE 241.3 MG/1000MG
400 TABLET ORAL
Qty: 90 | Refills: 3 | Status: DISCONTINUED | COMMUNITY
Start: 2022-06-29 | End: 2024-05-31

## 2024-06-03 LAB
ALBUMIN SERPL ELPH-MCNC: 4.8 G/DL
ALP BLD-CCNC: 111 U/L
ALT SERPL-CCNC: 20 U/L
ANION GAP SERPL CALC-SCNC: 16 MMOL/L
AST SERPL-CCNC: 20 U/L
BASOPHILS # BLD AUTO: 0.07 K/UL
BASOPHILS NFR BLD AUTO: 0.7 %
BILIRUB SERPL-MCNC: 0.7 MG/DL
BUN SERPL-MCNC: 22 MG/DL
CALCIUM SERPL-MCNC: 10.1 MG/DL
CHLORIDE SERPL-SCNC: 104 MMOL/L
CHOLEST SERPL-MCNC: 154 MG/DL
CMV DNA SPEC QL NAA+PROBE: NOT DETECTED IU/ML
CMVPCR LOG: NOT DETECTED LOG10IU/ML
CO2 SERPL-SCNC: 22 MMOL/L
CREAT SERPL-MCNC: 1.21 MG/DL
EGFR: 66 ML/MIN/1.73M2
EOSINOPHIL # BLD AUTO: 0.35 K/UL
EOSINOPHIL NFR BLD AUTO: 3.6 %
ESTIMATED AVERAGE GLUCOSE: 131 MG/DL
GLUCOSE SERPL-MCNC: 111 MG/DL
HBA1C MFR BLD HPLC: 6.2 %
HCT VFR BLD CALC: 51.8 %
HDLC SERPL-MCNC: 41 MG/DL
HGB BLD-MCNC: 16.8 G/DL
IMM GRANULOCYTES NFR BLD AUTO: 0.2 %
LDLC SERPL CALC-MCNC: 87 MG/DL
LYMPHOCYTES # BLD AUTO: 3.19 K/UL
LYMPHOCYTES NFR BLD AUTO: 32.6 %
MAGNESIUM SERPL-MCNC: 2.3 MG/DL
MAN DIFF?: NORMAL
MCHC RBC-ENTMCNC: 27.8 PG
MCHC RBC-ENTMCNC: 32.4 GM/DL
MCV RBC AUTO: 85.6 FL
MONOCYTES # BLD AUTO: 0.72 K/UL
MONOCYTES NFR BLD AUTO: 7.4 %
NEUTROPHILS # BLD AUTO: 5.43 K/UL
NEUTROPHILS NFR BLD AUTO: 55.5 %
NONHDLC SERPL-MCNC: 113 MG/DL
PLATELET # BLD AUTO: 270 K/UL
POTASSIUM SERPL-SCNC: 5.2 MMOL/L
PROT SERPL-MCNC: 7.7 G/DL
PSA SERPL-MCNC: 0.76 NG/ML
RBC # BLD: 6.05 M/UL
RBC # FLD: 14 %
SODIUM SERPL-SCNC: 142 MMOL/L
TACROLIMUS SERPL-MCNC: 4.9 NG/ML
TRIGL SERPL-MCNC: 152 MG/DL
WBC # FLD AUTO: 9.78 K/UL

## 2024-06-04 ENCOUNTER — RX RENEWAL (OUTPATIENT)
Age: 69
End: 2024-06-04

## 2024-06-04 RX ORDER — LANCETS 30 GAUGE
EACH MISCELLANEOUS
Qty: 3 | Refills: 3 | Status: ACTIVE | COMMUNITY
Start: 2024-06-04 | End: 1900-01-01

## 2024-06-04 RX ORDER — BLOOD SUGAR DIAGNOSTIC
STRIP MISCELLANEOUS
Qty: 300 | Refills: 3 | Status: ACTIVE | COMMUNITY
Start: 2024-06-04 | End: 1900-01-01

## 2024-06-06 DIAGNOSIS — Z94.1 HEART TRANSPLANT STATUS: ICD-10-CM

## 2024-06-12 PROBLEM — E87.5 HYPERKALEMIA: Status: ACTIVE | Noted: 2022-06-29

## 2024-06-12 PROBLEM — I27.20 PULMONARY HYPERTENSION: Status: ACTIVE | Noted: 2022-07-01

## 2024-06-12 PROBLEM — T86.21: Status: RESOLVED | Noted: 2023-07-02 | Resolved: 2024-06-12

## 2024-06-12 NOTE — END OF VISIT
[FreeTextEntry3] : I, Dr. Nuñez, personally performed the evaluation and management (E/M) services for this established patient who presents today with (a) new problem(s)/exacerbation of (an) existing condition(s).  That E/M includes conducting the examination, assessing all new/exacerbated conditions, and establishing a new plan of care.  Today, my REBECA, Sumi Nat, was here to observe my evaluation and management services for this new problem/exacerbated condition to be followed going forward.  [Time Spent: ___ minutes] : I have spent [unfilled] minutes of time on the encounter.

## 2024-06-12 NOTE — REVIEW OF SYSTEMS
[FreeTextEntry1] : The remaining 14-point ROS is otherwise negative or non-contributory except as noted in the HPI.

## 2024-06-12 NOTE — HISTORY OF PRESENT ILLNESS
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  Pt initially presented to [facility] 5/20 with near syncope in setting of worsening HF symptoms and found to have 41 episodes of VT, many terminating with ATP. LHC did not reveal new obstructive CAD and he underwent EPS which did not reveal endocardial substrate amenable for ablation. RHC revealed severely depressed cardiac output and he was transferred to [facility] 5/26 for advanced therapies evaluation. IABP was placed and he was listed UNOS status 2e for OHT. He was briefly made status 7 as he became febrile without leukocytosis, then bld cx negative x48hrs he was re-activated UNOS status 2e.   A suitable donor was identified and patient underwent OHT with Dr. Earl/Maria C on 6/21/22 (ischemic time 261 mins, CMV +/+, Toxo -/-). Patient was successfully extubated and IABP was removed on POD 1.  Cultures from the ICD site in the OR were positive for staph epidermidis/morganella morganii and he was treated with IV Vancomycin and Cefepime. CT scan demonstrated residual fibrous capsule in the region of the previous cardiac device in the left chest wall. Pt was then discharged home on oral antibiotics on 7/8/22.  Pt presented 6/28/23 for 1st annual RHC/EMBx/LHC, found to have asymptomatic ISHLT Grade 2R/3A rejection despite therapeutic Tacro level at time of biopsy (though prior levels had been variable, been difficult to maintain within goal range 8-10), normal heartcare one week prior, and normal graft function. He was treated with pulse oral steroids, cellcept was increased from 500mg to 750mg BID (then to 1G BID on 7/5), and he was briefly restarted on valcyte and nystatin ppx. Repeat biopsy 7/13 showed ISHLT Grade 1R/2. Additionally, pt had been reporting intermittent loose BMs, and GI PCR was positive for norovirus and supportive care was recommended. He initially reported improvement in loose stool, however by end of August he was experiencing 1-5 episodes daily. Stool studies were repeated 9/7, GI PCR sample was inadequate though C diff was negative. CMV PCR was low level detectable. On 9/13 patient was admitted for fever, dry cough, generalized malaise and decreased appetite; his loose stools had reportedly resolved. Pt underwent infectious work-up (cultures, RVP, CMV PCR) and Cellcept was briefly reduced to 500mg BID. RVP was positive for coronavirus (non covid), CMV persisted low level detectable. He also had return of loose stool, for which GI was consulted for outpatient follow-up and management. Cellcept was increased to 1G BID and patient was discharged home 9/16/23. Most recent CMV PCR 9/28 showed CMV not detected. Since hospital discharge, pt followed up with GI 10/3. He continued to report loose BMs; he was started on Lactaid tablets and Creon with meals, with no improvement in diarrhea. In 10/2023 decision was made to switch pt to Envarsus and reduce Cellcept tp 500mg BID) and diarrhea resolved.   On 12/27/23 pt called to report temp 102, body aches, cough and congestion found to be COVID positive. He was treated with a 5-day course of remdesivir d/t lower respiratory tract symptoms (completed 1/1/24). CT Chest showed GGO c/w viral infection. Cellcept was discontinued upon admission and he was discharged on Envarsus monotherapy.   === Pt here today for 2 year post heart transplant follow up. In the interval Envarsus was increased last week to 4 mg daily. Of note he confirms the frequency of farxiga 5 mg daily.   He reports feeling well. Here with his wife today. With the warmer weather, walking his dog 15-20 minutes daily. Started home improvement project on his dining room too. He is no longer feeling short of breath in the morning and his cough has fully resolved. He does not endorse any limitations today. Took the subway here today and was able to easily manage the stairs. Otherwise denies fever/chills, CP, SOB, HSIEH, dizziness/lightheadedness, N/V, or diarrhea. He reports his appetite remains good. Brings his home BP Log today: (Reflects AM BP Before Medication) -120 mmHg. DBP 79-93 mmHg. -122. He reports 100% medication compliance.

## 2024-06-12 NOTE — REASON FOR VISIT
[Other: ____] : [unfilled] [FreeTextEntry1] : Patient Instructions:  1. STOP Magnesium  2. Continue with Farxiga 5 mg daily  3. Check labs today 4. Recommend Annual Skin Cancer Screening with Dermatology 5. Cardiac MRI is scheduled for June 18, 2024 6. Follow up with Dr. Nuñez in 6 months

## 2024-06-12 NOTE — CARDIOLOGY SUMMARY
[de-identified] : 5/31/24 ECG: SR at 86 bpm, RBBB and right axis deviation, possible RV hypertrophy, consider pulm disease, NS-T abnl, no significant changes from 6/28/2023 6/28/23: ECG 97bpm, RBBB, when compared to EKG 3/15/23 no significant changes 03/15/23: ECG SR 98bpm, RBBB, when compared to EKG 10/20/22 no significant changes 10/20/22: ECG NSR 88bpm, RBBB, when compared to EKG 9/22/22 no significant changes 9/22/22 ECG: , RBBB,when compared to EKG 8/2/22 no significant changes 8/2/22 ECG: NSR 95bpm, RBBB, when compared to EKG 7/20/22 no significant changes 7/20/22 ECG: NSR at 100bpm RBBB 7/13/22 ECG: NSR at 94 bpm, RBBB. [de-identified] : 4/8/24 TTE: LVEF 57 % by 3D, no RWMA, normal diastolic function with normal filling pressure. LV global longitudinal strain is -17.3 % is borderline (range: -18 to -16%).  bpm andBP 126/80 mmHg. Mild RVE with normal wall thickness, and normal systolic function. RV free wall strain is --11 %. 3D RVEF 46.7%. No pericardial effusion seen. Compared to the transthoracic echocardiogram performed on 1/8/2024, there have been no significant interval changes.  1/8/24 TTE: CONCLUSIONS:  1. Left ventricular systolic function is normal with an ejection fraction of 55 % by 3D.  2. Normal left ventricular diastolic function.  3. Normal right ventricular cavity size, wall thickness, and borderline reduced systolic function.  4. No pericardial effusion seen.  5. Compared to the transthoracic echocardiogram performed on 10/4/2023 there have been no significant interval changes.  6/23/23: TTE: EF 55-60% LVIDd 4.3cm, GLS is 19.8%, borderline reduced RVSF, mild-mod TR, compared to TTE 6/14/23 there have been no signifiant interval changes 3/15/23 TTE EF 67% LVIDd 4.2cm, LVIDd 4.2cm, normal LVSF, normal RV function with decreased RVSF, mild TR, no pericardial effusion. Compared to TTE 1/4/23, RV size has improved, there is no change in RV function. 1/4/23 TTE EF 64% LVIDd 4.5cm, normal LVSF, Global strain -20%, decreased RV systolic function, no valvular abnormalities, no pericardial effusion. Copmared to TTE 11/30/22, no significant changes noted.  11/30/22 TTE EF 58% LVIDd 2.6, normal LFSV, decreased RV systolic function, RV wall strain -12.1%, no valvular abnormalities, no pericardial effusion 10/20/22 TTE limited: normal graft function. No pericardial effusion seen.  TTE 8/2/22: limited study in the setting of an RV biopsy 1. Normal right ventricular size and function. 2. Small pericardiall effusion. A bioptome is noted in the right heart. No change to the size of the pericardial effusion or RV function 7/20/22: TTE: limited study: hyperdynamic LVSF, normal RV size and function, normal pericardium with trace to small pericardial effusion adjacent to RV 07/05/22 TTE: limited study: normal biV function, small loculated pericardial effusion at the LV apex, measuring approx 1.0 cm seen both pre and post images. [de-identified] : 7/13/23: RA 2, RV 23/2, PA 26/15/218, PCWP 5, PA sat 72%, CO/CI 4.5/2.4, ISHLT Grade 1R/2 6/28/23: RA 2, RV 27/1, PA 28/12/20, PCWP 7, PA sat 68%, CO/CI 4.7/2.5, ISHLT Grade 2R/3A. LHC Normal coronaries.  6/28/22: RA 9, RV 3/11, PA 62/27/41, wedge 21 with v wave 31, PA sat 69.3%, /81/102, CO/Ci 6.87/3.62, grade 1R/2 7/5: RA 2, RV 35/3, PA 36/18/26, Wedge 15, PA sat 71.7, Kristy CO/CI 5.4/2.8, /75/93, SVR 1345 dsc, 1R/1A 7/13: RA 3, PAP 36/14/24 PCW 8, Pa sat 71%, Kristy CO/CI 6.1/3.2, EMBx ISHLT Grade 1R/1A 7/20: RA 3 PAP 47/20/30 PCW 15 PaSat 67% Rkisty CO/CI 5.9/3.2 EMBx ISHLT Grade 1R/1A 8/2: RA 4, PAP 35/16/25 PCW 12 Pasat 68% Kristy CO/CI 5.3/2.9, EMBx ISHLT Grade 1R/1A 8/16: RA 5, PAP 42/19/27 PCW 15 Pasat 72% Kristy CO/CI 5.6/3.0, EMBx ISHLT Grade 0R 9/22: RA 5 PAP 41/18/28 PCW 16 PaSat 75% Kristy CO/CI 5.8/3.1, EMBx ISHLT Grade 1R/1A 10/20: RA 10/20/22 RA 2 RV 32/2 PA 33/9/21 PCWP 9 Kristy CO/CI 6.09/3.32 ACR 1R/1A, pAMR 0 6/28/23: LHC w/ IVUS, No disease. RHC RA 2, PAP 28/12/20, PCWP 7, PaSat 68%, Kristy CO/CI 4.7/2.57.  EMBx = ISHLT Grade 2R/3A

## 2024-06-12 NOTE — DISCUSSION/SUMMARY
[Patient] : the patient [___ Month(s)] : in [unfilled] month(s) [EKG obtained to assist in diagnosis and management of assessed problem(s)] : EKG obtained to assist in diagnosis and management of assessed problem(s) [FreeTextEntry2] : and his wife [FreeTextEntry1] : 69 YO M with DM Type 2 (A1C 6.2%), mixed ICM/NICM, and CKD stage 2-3, s/p OHT on 6/21/22 (ischemic time 261 mins, CMV +/+, Toxo -/-) who is overall doing clinically well, but has had fluctuating tacrolimus levels despite reporting a stable medication regimen. Had asymptomatic Grade 2R/3A rejection identified at 1st Annual despite normal HeartCare testing early June, treated with oral pulse steroids and full dose Cellcept. Here today for routine f/u care with most recent heart care demonstrating increasing levels of cell free DNA.   #s/p OHT - continue ASA 81mg daily and rosuvastatin 5mg QHS for TCAD prophylaxis - 6/28/23 normal coronary arteries and normal IVUS - continue to monitor heartcare quarterly. 5/22 Allosure (0.21%), Allomap (36).  - Given recent rise in cell free DNA on 5/22 would check DSA today, Stress cMRI scheduled for 6/18/2024 if any decline in cardiac function would further pursue RHC +Bx +/- LHC. - This patient has a heart allograft and is at risk for rejection through immune system recognition of non-self tissue. AlloMap and AlloSure and noninvasive tests ordered to evaluate for the probability of rejection after pretest and assist in immunosuppression management. Studies have shown that these tests can help to rule out rejection without subjecting the patient to the bleeding, arrhythmia, and structural damage risks of invasive endomyocardial biopsies. The AlloMap and AlloSure tests help guide clinical decision making for this patient's surveillance schedule and immunosuppression adjustments.  #ISHLT Grade 2R/3A cellular rejection 6/28 EMBx - Normal hemodynamics and echocardiogram - In setting of lower dose CellCept (500 BID) and variable tacro levels - Treated with pulse prednisone taper, increased CellCept to 1G BID, EMBx 7/13 1R/2  # Immunosuppression - Tacro level has been variable, transitioned from BID tacrolimus to Envarsus daily 10/2023. Current dosing is Envarsus 4 mg daily.  - c/w cellcept 500mg bid (lower dose due to diarrhea) -  Prednisone was tapered off 8/10  # Prophylaxis - CMV +/+: completed 6 months of valcyte, briefly resumed 6/2023 in s/o rejection/increased immunosuppression, now discontinued. CMV PCR last negative 1/25/24. - Toxo -/-: no ppx needed - PJP: Completed Mepron  #Pulmonary hypertension - weaned and discontinued sildenafil as of March 2024 (for pre-transplant PH)  - monitor RV function and PAP via echo  #CKD stage 2, SCr 1.1-1.25, eGFR 66 - stable and doing well - avoid nephrotoxic agents  #HTN - generally controlled on current regimen - continue nifedipine XL 30 mg daily - pt did not tolerate cardura due to hypotension - pt to maintain home BP log and notify our team if >140/90  #DM Type 2 (HgA1c 6.1% 5/22/2024) - followed by endocrine with good control - currently taking Farxiga 5mg daily   #Hyperlipidemia - Lipid panel 1/26/24: , , LDL 80, HDL 43.  - Lipid Panel 5/22/2024 , Tri 158, LDL 92, HDL 40 - Encouraged heart healthy diet. - Continue rosuvastatin 5 mg po QHS  #Health maintenance - will stop mag oxide today 5/31, rechecking K today given hemolysis on last specimen collection - continue MVI - continue calcium citrate + vitamin D - DEXA 1/2024: +osteopenia, repeat in 2026 - Annual Derm appointment to be rescheduled by patient - f/u with PMD for routine health maintenance - last colonoscopy 2022, next due 2025  Annual testing with Stress Cardiac MRI and Follow up with Dr. Nuñez in 6 months.

## 2024-06-12 NOTE — PHYSICAL EXAM
[No Xanthelasma] : no xanthelasma [Normal Venous Pressure] : normal venous pressure [No Edema] : no edema [Normal] : alert and oriented, normal memory [Alert and Oriented] : alert and oriented [de-identified] : MMM, no perioral cyanosis [de-identified] : JVP < 6 cm H2O, no HJR

## 2024-06-18 ENCOUNTER — OUTPATIENT (OUTPATIENT)
Dept: OUTPATIENT SERVICES | Facility: HOSPITAL | Age: 69
LOS: 1 days | End: 2024-06-18
Payer: MEDICARE

## 2024-06-18 ENCOUNTER — APPOINTMENT (OUTPATIENT)
Dept: MRI IMAGING | Facility: HOSPITAL | Age: 69
End: 2024-06-18

## 2024-06-18 DIAGNOSIS — Z94.1 HEART TRANSPLANT STATUS: ICD-10-CM

## 2024-06-18 DIAGNOSIS — Z95.0 PRESENCE OF CARDIAC PACEMAKER: Chronic | ICD-10-CM

## 2024-06-18 DIAGNOSIS — Z98.890 OTHER SPECIFIED POSTPROCEDURAL STATES: Chronic | ICD-10-CM

## 2024-06-18 PROCEDURE — 93010 ELECTROCARDIOGRAM REPORT: CPT

## 2024-06-18 PROCEDURE — A9585: CPT

## 2024-06-18 PROCEDURE — 75563 CARD MRI W/STRESS IMG & DYE: CPT

## 2024-06-18 PROCEDURE — 75563 CARD MRI W/STRESS IMG & DYE: CPT | Mod: 26

## 2024-06-18 PROCEDURE — 93005 ELECTROCARDIOGRAM TRACING: CPT

## 2024-06-18 RX ORDER — TACROLIMUS 1 MG/1
1 TABLET, EXTENDED RELEASE ORAL DAILY
Qty: 120 | Refills: 5 | Status: ACTIVE | COMMUNITY
Start: 2023-09-11 | End: 1900-01-01

## 2024-06-19 LAB
ALBUMIN SERPL ELPH-MCNC: 5.1 G/DL
ALP BLD-CCNC: 112 U/L
ALT SERPL-CCNC: 15 U/L
ANION GAP SERPL CALC-SCNC: 12 MMOL/L
AST SERPL-CCNC: 16 U/L
BASOPHILS # BLD AUTO: 0.05 K/UL
BASOPHILS NFR BLD AUTO: 0.5 %
BILIRUB SERPL-MCNC: 0.7 MG/DL
BUN SERPL-MCNC: 23 MG/DL
CALCIUM SERPL-MCNC: 10 MG/DL
CHLORIDE SERPL-SCNC: 103 MMOL/L
CO2 SERPL-SCNC: 25 MMOL/L
CREAT SERPL-MCNC: 1.22 MG/DL
EGFR: 65 ML/MIN/1.73M2
EOSINOPHIL # BLD AUTO: 0.29 K/UL
EOSINOPHIL NFR BLD AUTO: 3 %
GLUCOSE SERPL-MCNC: 116 MG/DL
HCT VFR BLD CALC: 52.3 %
HGB BLD-MCNC: 17 G/DL
IMM GRANULOCYTES NFR BLD AUTO: 0.3 %
LYMPHOCYTES # BLD AUTO: 3.03 K/UL
LYMPHOCYTES NFR BLD AUTO: 31.9 %
MAGNESIUM SERPL-MCNC: 2.1 MG/DL
MAN DIFF?: NORMAL
MCHC RBC-ENTMCNC: 27.2 PG
MCHC RBC-ENTMCNC: 32.5 GM/DL
MCV RBC AUTO: 83.5 FL
MONOCYTES # BLD AUTO: 0.49 K/UL
MONOCYTES NFR BLD AUTO: 5.2 %
NEUTROPHILS # BLD AUTO: 5.62 K/UL
NEUTROPHILS NFR BLD AUTO: 59.1 %
PLATELET # BLD AUTO: 240 K/UL
POTASSIUM SERPL-SCNC: 4.8 MMOL/L
PROT SERPL-MCNC: 7.8 G/DL
RBC # BLD: 6.26 M/UL
RBC # FLD: 14.3 %
SODIUM SERPL-SCNC: 140 MMOL/L
TACROLIMUS SERPL-MCNC: 7.5 NG/ML
WBC # FLD AUTO: 9.51 K/UL

## 2024-06-25 ENCOUNTER — APPOINTMENT (OUTPATIENT)
Dept: ENDOCRINOLOGY | Facility: CLINIC | Age: 69
End: 2024-06-25
Payer: MEDICARE

## 2024-06-25 VITALS
HEART RATE: 80 BPM | BODY MASS INDEX: 21.47 KG/M2 | DIASTOLIC BLOOD PRESSURE: 80 MMHG | HEIGHT: 70 IN | WEIGHT: 150 LBS | OXYGEN SATURATION: 98 % | SYSTOLIC BLOOD PRESSURE: 130 MMHG

## 2024-06-25 DIAGNOSIS — E11.65 TYPE 2 DIABETES MELLITUS WITH HYPERGLYCEMIA: ICD-10-CM

## 2024-06-25 DIAGNOSIS — E78.5 HYPERLIPIDEMIA, UNSPECIFIED: ICD-10-CM

## 2024-06-25 DIAGNOSIS — I10 ESSENTIAL (PRIMARY) HYPERTENSION: ICD-10-CM

## 2024-06-25 PROCEDURE — 99204 OFFICE O/P NEW MOD 45 MIN: CPT

## 2024-06-25 PROCEDURE — G2211 COMPLEX E/M VISIT ADD ON: CPT

## 2024-07-12 ENCOUNTER — APPOINTMENT (OUTPATIENT)
Dept: INTERNAL MEDICINE | Facility: CLINIC | Age: 69
End: 2024-07-12

## 2024-07-15 ENCOUNTER — APPOINTMENT (OUTPATIENT)
Dept: INTERNAL MEDICINE | Facility: CLINIC | Age: 69
End: 2024-07-15

## 2024-07-17 LAB
ALBUMIN SERPL ELPH-MCNC: 5 G/DL
ALP BLD-CCNC: 105 U/L
ALT SERPL-CCNC: 20 U/L
ANION GAP SERPL CALC-SCNC: 13 MMOL/L
AST SERPL-CCNC: 17 U/L
BASOPHILS # BLD AUTO: 0.05 K/UL
BASOPHILS NFR BLD AUTO: 0.5 %
BILIRUB SERPL-MCNC: 1 MG/DL
BUN SERPL-MCNC: 23 MG/DL
CALCIUM SERPL-MCNC: 9.8 MG/DL
CHLORIDE SERPL-SCNC: 103 MMOL/L
CO2 SERPL-SCNC: 22 MMOL/L
CREAT SERPL-MCNC: 1.28 MG/DL
EGFR: 62 ML/MIN/1.73M2
EOSINOPHIL # BLD AUTO: 0.2 K/UL
EOSINOPHIL NFR BLD AUTO: 2.1 %
GLUCOSE SERPL-MCNC: 117 MG/DL
HCT VFR BLD CALC: 52.9 %
HGB BLD-MCNC: 16.8 G/DL
IMM GRANULOCYTES NFR BLD AUTO: 0.2 %
LYMPHOCYTES # BLD AUTO: 3.61 K/UL
LYMPHOCYTES NFR BLD AUTO: 37.3 %
MAGNESIUM SERPL-MCNC: 2.1 MG/DL
MAN DIFF?: NORMAL
MCHC RBC-ENTMCNC: 26.3 PG
MCHC RBC-ENTMCNC: 31.8 GM/DL
MCV RBC AUTO: 82.8 FL
MONOCYTES # BLD AUTO: 0.65 K/UL
MONOCYTES NFR BLD AUTO: 6.7 %
NEUTROPHILS # BLD AUTO: 5.15 K/UL
NEUTROPHILS NFR BLD AUTO: 53.2 %
PLATELET # BLD AUTO: 268 K/UL
POTASSIUM SERPL-SCNC: 4.6 MMOL/L
PROT SERPL-MCNC: 7.5 G/DL
RBC # BLD: 6.39 M/UL
RBC # FLD: 15.4 %
SODIUM SERPL-SCNC: 138 MMOL/L
TACROLIMUS SERPL-MCNC: 6.3 NG/ML
WBC # FLD AUTO: 9.68 K/UL

## 2024-07-18 LAB
CMV DNA SPEC QL NAA+PROBE: NOT DETECTED IU/ML
CMVPCR LOG: NOT DETECTED LOG10IU/ML

## 2024-08-05 ENCOUNTER — RX RENEWAL (OUTPATIENT)
Age: 69
End: 2024-08-05

## 2024-08-08 ENCOUNTER — RX RENEWAL (OUTPATIENT)
Age: 69
End: 2024-08-08

## 2024-08-09 ENCOUNTER — APPOINTMENT (OUTPATIENT)
Dept: ENDOCRINOLOGY | Facility: CLINIC | Age: 69
End: 2024-08-09

## 2024-08-20 NOTE — ED ADULT TRIAGE NOTE - BP NONINVASIVE DIASTOLIC (MM HG)
Quality 130: Documentation Of Current Medications In The Medical Record: Current Medications Documented Quality 402: Tobacco Use And Help With Quitting Among Adolescents: Patient screened for tobacco and never smoked Quality 47: Advance Care Plan: Advance care planning not documented, reason not otherwise specified. Quality 431: Preventive Care And Screening: Unhealthy Alcohol Use - Screening: Patient not identified as an unhealthy alcohol user when screened for unhealthy alcohol use using a systematic screening method Quality 134: Screening For Clinical Depression And Follow-Up Plan: Depression Screening not documented, reason not given Detail Level: Detailed Quality 110: Preventive Care And Screening: Influenza Immunization: Influenza Immunization previously received during influenza season Quality 226: Preventive Care And Screening: Tobacco Use: Screening And Cessation Intervention: Patient screened for tobacco use and is an ex/non-smoker 80

## 2024-09-17 ENCOUNTER — RX RENEWAL (OUTPATIENT)
Age: 69
End: 2024-09-17

## 2024-09-26 ENCOUNTER — APPOINTMENT (OUTPATIENT)
Dept: INTERNAL MEDICINE | Facility: CLINIC | Age: 69
End: 2024-09-26
Payer: MEDICARE

## 2024-09-26 ENCOUNTER — NON-APPOINTMENT (OUTPATIENT)
Age: 69
End: 2024-09-26

## 2024-09-26 VITALS
WEIGHT: 151 LBS | HEART RATE: 95 BPM | HEIGHT: 70 IN | BODY MASS INDEX: 21.62 KG/M2 | SYSTOLIC BLOOD PRESSURE: 124 MMHG | OXYGEN SATURATION: 98 % | DIASTOLIC BLOOD PRESSURE: 80 MMHG | TEMPERATURE: 98.1 F

## 2024-09-26 DIAGNOSIS — Z94.1 HEART TRANSPLANT STATUS: ICD-10-CM

## 2024-09-26 DIAGNOSIS — D84.9 IMMUNODEFICIENCY, UNSPECIFIED: ICD-10-CM

## 2024-09-26 DIAGNOSIS — E13.9 OTHER SPECIFIED DIABETES MELLITUS W/OUT COMPLICATIONS: ICD-10-CM

## 2024-09-26 DIAGNOSIS — E11.65 TYPE 2 DIABETES MELLITUS WITH HYPERGLYCEMIA: ICD-10-CM

## 2024-09-26 DIAGNOSIS — I10 ESSENTIAL (PRIMARY) HYPERTENSION: ICD-10-CM

## 2024-09-26 DIAGNOSIS — E78.5 HYPERLIPIDEMIA, UNSPECIFIED: ICD-10-CM

## 2024-09-26 PROCEDURE — 93000 ELECTROCARDIOGRAM COMPLETE: CPT

## 2024-09-26 PROCEDURE — 99214 OFFICE O/P EST MOD 30 MIN: CPT

## 2024-09-26 PROCEDURE — G2211 COMPLEX E/M VISIT ADD ON: CPT

## 2024-09-26 NOTE — HEALTH RISK ASSESSMENT
[No] : No [No falls in past year] : Patient reported no falls in the past year [0] : 2) Feeling down, depressed, or hopeless: Not at all (0) [PHQ-2 Negative - No further assessment needed] : PHQ-2 Negative - No further assessment needed [[id number]] : 0 [Former] : Former [15-19] : 15-19 [> 15 Years] : > 15 Years [NO] : No [Patient reported colonoscopy was normal] : Patient reported colonoscopy was normal [] :

## 2024-09-26 NOTE — REVIEW OF SYSTEMS
[Fever] : no fever [Night Sweats] : no night sweats [Discharge] : no discharge [Vision Problems] : no vision problems [Earache] : no earache [Nasal Discharge] : no nasal discharge [Chest Pain] : no chest pain [Orthopena] : no orthopnea [Shortness Of Breath] : no shortness of breath [Abdominal Pain] : no abdominal pain [Vomiting] : no vomiting [Dysuria] : no dysuria [Hematuria] : no hematuria [Joint Pain] : joint pain [Back Pain] : no back pain [Itching] : no itching [Skin Rash] : no skin rash [Headache] : no headache [Memory Loss] : no memory loss [Suicidal] : not suicidal [Easy Bleeding] : no easy bleeding

## 2024-09-26 NOTE — HISTORY OF PRESENT ILLNESS
[Spouse] : spouse [FreeTextEntry1] : Establish care [de-identified] : 68 year old male here to establish care.   His prior PCP has moved out of state and he was looking for new PCP.  Currently he has no acute medical complaints such as chest pain, shortness of breath or cough.

## 2024-09-26 NOTE — PLAN
[FreeTextEntry1] : s/p OHT(6/21/22)- on ASA, statin, immunosuppression, following with transplant team Chronic HFrEF - following with cardiology DM - steroid induced, c/w farxiga, following with endocrinology HTN - controlled - c/w nifedipine HLD - c/w crestor 5 mg, pt defers labs today HMT - colonoscopy due in 2025, previous PSA screen negative

## 2024-11-01 LAB
ALBUMIN SERPL ELPH-MCNC: 4.6 G/DL
ALP BLD-CCNC: 92 U/L
ALT SERPL-CCNC: 20 U/L
ANION GAP SERPL CALC-SCNC: 14 MMOL/L
AST SERPL-CCNC: 18 U/L
BASOPHILS # BLD AUTO: 0.06 K/UL
BASOPHILS NFR BLD AUTO: 0.7 %
BILIRUB SERPL-MCNC: 0.9 MG/DL
BUN SERPL-MCNC: 23 MG/DL
CALCIUM SERPL-MCNC: 10.1 MG/DL
CHLORIDE SERPL-SCNC: 102 MMOL/L
CMV DNA SPEC QL NAA+PROBE: NOT DETECTED IU/ML
CMVPCR LOG: NOT DETECTED LOG10IU/ML
CO2 SERPL-SCNC: 24 MMOL/L
CREAT SERPL-MCNC: 1.14 MG/DL
EGFR: 71 ML/MIN/1.73M2
EOSINOPHIL # BLD AUTO: 0.22 K/UL
EOSINOPHIL NFR BLD AUTO: 2.7 %
GLUCOSE SERPL-MCNC: 124 MG/DL
HCT VFR BLD CALC: 53.8 %
HGB BLD-MCNC: 17.9 G/DL
IMM GRANULOCYTES NFR BLD AUTO: 0.1 %
LYMPHOCYTES # BLD AUTO: 3.32 K/UL
LYMPHOCYTES NFR BLD AUTO: 40.7 %
MAGNESIUM SERPL-MCNC: 1.8 MG/DL
MAN DIFF?: NORMAL
MCHC RBC-ENTMCNC: 27.7 PG
MCHC RBC-ENTMCNC: 33.3 GM/DL
MCV RBC AUTO: 83.2 FL
MONOCYTES # BLD AUTO: 0.48 K/UL
MONOCYTES NFR BLD AUTO: 5.9 %
NEUTROPHILS # BLD AUTO: 4.06 K/UL
NEUTROPHILS NFR BLD AUTO: 49.9 %
PLATELET # BLD AUTO: 204 K/UL
POTASSIUM SERPL-SCNC: 4.6 MMOL/L
PROT SERPL-MCNC: 7.2 G/DL
RBC # BLD: 6.47 M/UL
RBC # FLD: 15.1 %
SODIUM SERPL-SCNC: 140 MMOL/L
TACROLIMUS SERPL-MCNC: 7.7 NG/ML
WBC # FLD AUTO: 8.15 K/UL

## 2024-11-08 ENCOUNTER — APPOINTMENT (OUTPATIENT)
Dept: HEART FAILURE | Facility: CLINIC | Age: 69
End: 2024-11-08

## 2024-11-08 ENCOUNTER — NON-APPOINTMENT (OUTPATIENT)
Age: 69
End: 2024-11-08

## 2024-11-08 VITALS
DIASTOLIC BLOOD PRESSURE: 84 MMHG | SYSTOLIC BLOOD PRESSURE: 118 MMHG | OXYGEN SATURATION: 97 % | HEART RATE: 106 BPM | WEIGHT: 152 LBS | HEIGHT: 70 IN | BODY MASS INDEX: 21.76 KG/M2

## 2024-11-08 DIAGNOSIS — Z94.1 HEART TRANSPLANT STATUS: ICD-10-CM

## 2024-11-08 DIAGNOSIS — E78.5 HYPERLIPIDEMIA, UNSPECIFIED: ICD-10-CM

## 2024-11-08 DIAGNOSIS — D84.9 IMMUNODEFICIENCY, UNSPECIFIED: ICD-10-CM

## 2024-11-08 PROCEDURE — G2211 COMPLEX E/M VISIT ADD ON: CPT

## 2024-11-08 PROCEDURE — 99215 OFFICE O/P EST HI 40 MIN: CPT

## 2024-11-08 PROCEDURE — 93000 ELECTROCARDIOGRAM COMPLETE: CPT

## 2024-11-14 ENCOUNTER — APPOINTMENT (OUTPATIENT)
Dept: DERMATOLOGY | Facility: CLINIC | Age: 69
End: 2024-11-14
Payer: MEDICARE

## 2024-11-14 VITALS — HEIGHT: 70 IN | WEIGHT: 151 LBS | BODY MASS INDEX: 21.62 KG/M2

## 2024-11-14 DIAGNOSIS — L82.1 OTHER SEBORRHEIC KERATOSIS: ICD-10-CM

## 2024-11-14 DIAGNOSIS — D22.9 MELANOCYTIC NEVI, UNSPECIFIED: ICD-10-CM

## 2024-11-14 PROCEDURE — 99203 OFFICE O/P NEW LOW 30 MIN: CPT

## 2024-11-16 PROBLEM — D22.9 ACQUIRED MELANOCYTIC NEVUS: Status: ACTIVE | Noted: 2024-11-16

## 2024-11-16 PROBLEM — L82.1 SEBORRHEIC KERATOSES: Status: ACTIVE | Noted: 2024-11-16

## 2024-12-11 ENCOUNTER — OUTPATIENT (OUTPATIENT)
Dept: OUTPATIENT SERVICES | Facility: HOSPITAL | Age: 69
LOS: 1 days | End: 2024-12-11
Payer: MEDICARE

## 2024-12-11 ENCOUNTER — APPOINTMENT (OUTPATIENT)
Dept: CV DIAGNOSITCS | Facility: HOSPITAL | Age: 69
End: 2024-12-11

## 2024-12-11 ENCOUNTER — RESULT REVIEW (OUTPATIENT)
Age: 69
End: 2024-12-11

## 2024-12-11 DIAGNOSIS — Z94.1 HEART TRANSPLANT STATUS: ICD-10-CM

## 2024-12-11 DIAGNOSIS — Z95.0 PRESENCE OF CARDIAC PACEMAKER: Chronic | ICD-10-CM

## 2024-12-11 DIAGNOSIS — Z98.890 OTHER SPECIFIED POSTPROCEDURAL STATES: Chronic | ICD-10-CM

## 2024-12-11 PROCEDURE — 93356 MYOCRD STRAIN IMG SPCKL TRCK: CPT

## 2024-12-11 PROCEDURE — 93306 TTE W/DOPPLER COMPLETE: CPT | Mod: 26

## 2024-12-11 PROCEDURE — 76376 3D RENDER W/INTRP POSTPROCES: CPT | Mod: 26

## 2024-12-11 PROCEDURE — 93306 TTE W/DOPPLER COMPLETE: CPT

## 2024-12-11 PROCEDURE — 76376 3D RENDER W/INTRP POSTPROCES: CPT

## 2024-12-28 NOTE — ASU DISCHARGE PLAN (ADULT/PEDIATRIC) - DO NOT TAKE THE STERI STRIPS OFF
William A Franke is a 78 y.o. male on day 7 of admission presenting with Acute encephalopathy.    Subjective   Interval History:   Afebrile, no chills  Undergoing dialysis when seen  Interval discussion with pulmonary regarding indium scan findings-suspect septic emboli        Review of Systems   All other systems reviewed and are negative.      Objective   Range of Vitals (last 24 hours)  Heart Rate:  [51-62]   Temp:  [36.2 °C (97.2 °F)-37.2 °C (99 °F)]   Resp:  [17-18]   BP: (132-178)/(40-59)   SpO2:  [97 %-100 %]   Daily Weight  12/21/24 : 81.6 kg (180 lb)    Body mass index is 28.19 kg/m².    Physical Exam  Constitutional:       Appearance: Normal appearance.   HENT:      Head: Normocephalic and atraumatic.      Nose: Nose normal.   Eyes:      General: No scleral icterus.     Extraocular Movements: Extraocular movements intact.      Conjunctiva/sclera: Conjunctivae normal.   Cardiovascular:      Rate and Rhythm: Normal rate and regular rhythm.      Heart sounds: Normal heart sounds.   Pulmonary:      Effort: Pulmonary effort is normal.      Breath sounds: Normal breath sounds.   Abdominal:      General: Bowel sounds are normal.      Palpations: Abdomen is soft.   Musculoskeletal:      Cervical back: Normal range of motion and neck supple.      Right lower leg: No edema.      Left lower leg: No edema.   Skin:     General: Skin is warm and dry.   Neurological:      Mental Status: He is alert.   Psychiatric:         Behavior: Behavior is cooperative.     Antibiotics  ceFAZolin - 2 gram/100 mL    Relevant Results  Labs  Results from last 72 hours   Lab Units 12/27/24  0507   WBC AUTO x10*3/uL 6.6   HEMOGLOBIN g/dL 9.8*   HEMATOCRIT % 28.6*   PLATELETS AUTO x10*3/uL 161     Results from last 72 hours   Lab Units 12/27/24  0507   SODIUM mmol/L 138   POTASSIUM mmol/L 3.8   CHLORIDE mmol/L 100   CO2 mmol/L 28   BUN mg/dL 27*   CREATININE mg/dL 7.19*   GLUCOSE mg/dL 111*   CALCIUM mg/dL 8.6   ANION GAP mmol/L 14   EGFR  "mL/min/1.73m*2 7*     Results from last 72 hours   Lab Units 12/27/24  0507   PROTEIN TOTAL g/dL 5.9*     Estimated Creatinine Clearance: 8.9 mL/min (A) (by C-G formula based on SCr of 7.19 mg/dL (H)).  No results found for: \"CRP\"  Microbiology  Susceptibility data from last 14 days.  Collected Specimen Info Organism Clindamycin Erythromycin Oxacillin Tetracycline Trimethoprim/Sulfamethoxazole Vancomycin   12/22/24 Blood culture from Peripheral Venipuncture Staphylococcus aureus         12/21/24 Blood culture from Peripheral Venipuncture Methicillin Susceptible Staphylococcus aureus (MSSA)  S  S  S  S  S  S   12/21/24 Blood culture from Peripheral Venipuncture Staphylococcus aureus           Imaging  XR chest 1 view    Result Date: 12/28/2024  Interpreted By:  Stone Negrete, STUDY: XR CHEST 1 VIEW;  12/28/2024 8:24 am   INDICATION: Signs/Symptoms:Pleural effusions.     COMPARISON: CT chest without contrast 25 December 2024   ACCESSION NUMBER(S): [id number]   ORDERING CLINICIAN: JEISON OCHOA   TECHNIQUE: Single frontal view of the chest; Portable technique   FINDINGS: Enlarged cardiac silhouette is unchanged   No demonstrable pleural effusion or pneumothorax   No airspace infiltrate or edema       NO ACUTE DISEASE IN THE CHEST EVIDENT ON PORTABLE FRONTAL CHEST RADIOGRAPH   MACRO: None   Signed by: Stone Negrete 12/28/2024 9:34 AM Dictation workstation:   [id number]    US thoracentesis    Result Date: 12/27/2024  Interpreted By:  Bassam Brown, STUDY: US THORACENTESIS; 12/27/2024 10:13 am   INDICATION: Right pleural effusion. Referred for diagnostic thoracentesis   COMPARISON: None   ACCESSION NUMBER(S): [id number]   ORDERING CLINICIAN: JEISON OCHOA   TECHNIQUE: Informed consent obtained. Patient positionedsitting upright. Skin prepped, draped and anesthetized. Under ultrasound guidance, a centesis catheter/needle was advanced into rightpleural cavity.   FINDINGS: A total of 700 mL of clear yellow " fluid was aspirated. A sample was sent for analysis. The patient tolerated the procedure well.       Ultrasound-guided right thoracentesis.   Signed by: Bassam Brown 12/27/2024 12:16 PM Dictation workstation:   [id number]    NM INFLAMMATION whole body w indium 111    Result Date: 12/27/2024  Interpreted By:  Rafiq Hamm, STUDY: NM INFLAMMATION WHOLE BODY WITH INDIUM 111;  12/27/2024 9:37 am   INDICATION: Signs/Symptoms:Assess for left brachial axillary graft infection.   COMPARISON: CT of chest on 12/25/2024   ACCESSION NUMBER(S): [id number]   ORDERING CLINICIAN: WALLY LEAVITT   TECHNIQUE: DIVISION OF NUCLEAR MEDICINE RADIOLABELED In-111-WBC SCAN   The patient received an intravenous dose of 0.502 mCi of In-111 radiolabeled autologous leukocytes.  Anterior and posterior images were then acquired to include the whole body approximately 24 hours later.  SPECT CT of the chest was obtained as well.   FINDINGS: Normal activity is noted in the liver, spleen, and bone marrow spaces.  No abnormal  In-111 labeled WBC accumulation in the left bronchial axillary graft to suggest an active infection.   Low-dose CT demonstrates multiple pulmonary nodules in both lungs, left greater than right as well  as moderate-sized right pleural effusion.       1. Negative study, no abnormal In-111 labeled WBC accumulation in the left bronchial axillary graft to suggest an active infection. 2. Low-dose CT demonstrates multiple pulmonary nodules in both lungs, left greater than right as well as moderate-sized right pleural effusion, please correlate with CT chest on 12/25/2024   MACRO: None   Signed by: Rafiq Hamm 12/27/2024 10:08 AM Dictation workstation:   [id number]    CT chest wo IV contrast    Result Date: 12/26/2024  Interpreted By:  Frank Dang, STUDY: CT CHEST WO IV CONTRAST; 12/25/2024 11:55 am   INDICATION: Signs/Symptoms:evaluate for pneumonia   COMPARISON: Plain radiographs from the previous day and CT scan from  September 2021.   ACCESSION NUMBER(S): [id number]   ORDERING CLINICIAN: HUNTER CHÁVEZ   TECHNIQUE: Spiral axial unenhanced images were obtained through the chest. Post processing sagittal and coronal reconstruction images were also performed.   All CT examinations are performed with one or more of the following dose reduction techniques: Automated Exposure Control, adjustment of mA and/or kV according to patient size, or use of iterative reconstruction techniques.   PATIENT RADIATION EXPOSURE DATA: CTDI (mGy): DLP (mGy/cm):   FINDINGS Extensive atherosclerotic calcifications involve coronary arteries and to a lesser degree the aorta. There is no aortic aneurysm.   There is no mediastinal, hilar or axillary lymphadenopathy. There is moderate right pleural effusion, with atelectatic changes/infiltrates in the right lung. An 8 mm nodule is now seen in the as ago esophageal recess of the right lobe (axial image number 164). Ill-defined nodular consolidation measuring 16.5 mm is noted in the left upper lobe (axial image number 68). There is irregular nodular density measuring 8 mm in the left upper lobe (axial image number 70). Ill-defined 7.4 mm nodular density seen in the left upper lobe more inferiorly (axial image number 109). Several smaller fibro nodular densities are seen in the superior segment of the left lower lobe. Subpleural vague nodular density measuring up to 1.5 cm is seen in the left upper lobe posteriorly/laterally (axial image number 154).   Images from the upper abdomen demonstrate calcifications in the visualized portion of the right kidney, most likely nonobstructing stones. There is also stable 1.3 cm hypodensity in the left lobe of the liver, most likely a cyst       1. Moderate right pleural effusion is of indeterminate significance, possibly related to pneumonia, however malignant effusion can not be excluded. Follow-up as needed. If indicated further evaluate with thoracentesis. 2. Several  Statement Selected bilateral ill-defined nodular densities as above, not present on the CT scan from 2021, possibly infectious/inflammatory versus neoplastic. Short-term follow-up recommended.   MACRO: Critical Finding:  See findings. Notification was initiated on 12/26/2024 at 5:16 pm by  Frank Dang.  (**-YCF-**) Instructions:   Signed by: Frank Dang 12/26/2024 5:16 PM Dictation workstation:   [id number]    Electrocardiogram, 12-lead ACS symptoms    Result Date: 12/24/2024  Normal sinus rhythm Left ventricular hypertrophy with repolarization abnormality ( R in aVL , Sokolow-Barriga , David product , Romhilt-Thornton ) Abnormal ECG No previous ECGs available Confirmed by Jonathan Masters ([id number]) on 12/24/2024 2:27:57 PM    XR chest 1 view    Result Date: 12/24/2024  Interpreted By:  Frank Dang, STUDY: XR CHEST 1 VIEW; 12/24/2024 9:51 am   INDICATION: Signs/Symptoms:Pneumonia   COMPARISON: 12 21 2024.   ACCESSION NUMBER(S): [id number]   ORDERING CLINICIAN: BAHMAN BLANK   FINDINGS: The study is limited due to  rotation/lordotic projection. The cardiomediastinal silhouette is within normal limits for the technique. There is no pneumothorax or confluence infiltrates. There is blunting of the right costophrenic angle, due to improving small right pleural effusion. The osseous structures are unremarkable.       Improving small right pleural effusion.   Signed by: Frank Dang 12/24/2024 12:53 PM Dictation workstation:   [id number]    Transthoracic Echo (TTE) Limited    Result Date: 12/24/2024           [place] [address]            Phone [phone] TRANSTHORACIC ECHOCARDIOGRAM REPORT Patient Name:       WILLIAM A FRANKE Reading Physician:    [id number] Jared Rooney DO Study Date:         12/24/2024          Ordering Provider:    [id number] BAHMAN BLANK  MRN/PID:            [id number]            Fellow: Accession#:         [id number]        Nurse: Date of Birth/Age:  1946 / 78     Sonographer:          Nakita PUENTES Gender Assigned at  M                   Additional Staff: Birth: Height:             170.20 cm           Admit Date:           12/22/2024 Weight:             81.65 kg            Admission Status:     Inpatient -                                                               Routine BSA / BMI:          1.93 m2 / 28.19     Department Location:                     kg/m2 Blood Pressure: 169 /55 mmHg Study Type:    TRANSTHORACIC ECHO (TTE) LIMITED Diagnosis/ICD: Bacteremia-R78.81 Indication:    Endocarditis CPT Codes:     Echo Limited-30553 Patient History: Pertinent History: CDK stageIV Stable Angina HTN CP HLD CAD AS CHF Chronic Liver                    Disease NSTEMI Atrteriovenous Fistula Stenosis. Study Detail: The following Echo studies were performed: 2D, M-Mode, Doppler and               color flow. Unable to obtain suprasternal notch and subcostal               view.  PHYSICIAN INTERPRETATION: Left Ventricle: The left ventricular systolic function is normal, with a visually estimated ejection fraction of 60-65%. There are no regional wall motion abnormalities. The left ventricular cavity size is normal. There is normal septal and mildly increased posterior left ventricular wall thickness. There is left ventricular concentric remodeling. Left ventricular diastolic filling was not assessed. Left Atrium: The left atrium is upper limits of normal in size. Right Ventricle: The right ventricle is normal in size. There is normal right ventricular global systolic function. Right Atrium: The right atrium is normal in size. Aortic Valve: The aortic valve is trileaflet. There is no evidence of aortic valve regurgitation. The peak instantaneous gradient of the aortic valve is 15 mmHg.  Mitral Valve: The mitral valve is normal in structure. There is trace to mild mitral valve regurgitation. Tricuspid Valve: The tricuspid valve is structurally normal. No evidence of tricuspid regurgitation. Pulmonic Valve: The pulmonic valve is structurally normal. There is trace pulmonic valve regurgitation. Pericardium: No pericardial effusion noted. Aorta: The aortic root is normal.  CONCLUSIONS:  1. The left ventricular systolic function is normal, with a visually estimated ejection fraction of 60-65%.  2. There is normal right ventricular global systolic function.  3. No valvular vegetations visualized. QUANTITATIVE DATA SUMMARY:  2D MEASUREMENTS:           Normal Ranges: IVSd:            0.80 cm   (0.6-1.1cm) LVPWd:           1.29 cm   (0.6-1.1cm) LVIDd:           5.00 cm   (3.9-5.9cm) LVIDs:           3.39 cm LV Mass Index:   99.7 g/m2 LV % FS          32.2 %  LA VOLUME:                    Normal Ranges: LA Vol A4C:        96.5 ml    (22+/-6mL/m2) LA Vol A2C:        96.6 ml LA Vol BP:         100.8 ml LA Vol Index A4C:  49.9ml/m2 LA Vol Index A2C:  50.0 ml/m2 LA Vol Index BP:   52.1 ml/m2 LA Area A4C:       26.1 cm2 LA Area A2C:       25.0 cm2 LA Major Axis A4C: 6.0 cm LA Major Axis A2C: 5.5 cm LA Volume Index:   51.9 ml/m2 LA Vol A4C:        88.7 ml LA Vol A2C:        93.9 ml LA Vol Index BSA:  47.2 ml/m2  RA VOLUME BY A/L METHOD:            Normal Ranges: RA Vol A4C:              33.9 ml    (8.3-19.5ml) RA Vol Index A4C:        17.5 ml/m2 RA Area A4C:             15.2 cm2 RA Major Axis A4C:       5.8 cm  M-MODE MEASUREMENTS:         Normal Ranges: LAs:                 5.25 cm (2.7-4.0cm)  LV SYSTOLIC FUNCTION BY 2D PLANIMETRY (MOD):                      Normal Ranges: EF-A4C View:    58 % (>=55%) EF-A2C View:    63 % EF-Biplane:     60 % EF-Visual:      63 % LV EF Reported: 63 %  LV DIASTOLIC FUNCTION:          Normal Ranges: MV Peak E:             0.84 m/s (0.7-1.2 m/s) MV Peak A:             1.14 m/s  (0.42-0.7 m/s) E/A Ratio:             0.74     (1.0-2.2)  MITRAL VALVE:          Normal Ranges: MV DT:        173 msec (150-240msec)  AORTIC VALVE:            Normal Ranges: AoV Vmax:      1.93 m/s  (<=1.7m/s) AoV Peak P.9 mmHg (<20mmHg) LVOT Max Joss:  1.24 m/s  (<=1.1m/s) LVOT VTI:      33.46 cm LVOT Diameter: 1.95 cm   (1.8-2.4cm) AoV Area,Vmax: 1.91 cm2  (2.5-4.5cm2)  RIGHT VENTRICLE: RV Basal 2.70 cm RV Mid   2.40 cm RV Major 6.3 cm  TRICUSPID VALVE/RVSP:          Normal Ranges: Peak TR Velocity:     2.83 m/s RV Syst Pressure:     35 mmHg  (< 30mmHg)  PULMONIC VALVE:           Normal Ranges: PV Accel Time:  86 msec   (>120ms) PV Max Joss:     1.6 m/s   (0.6-0.9m/s) PV Max PG:      10.3 mmHg  [id number] Jared [facility]  Electronically signed on 2024 at 10:02:08 AM  ** Final **     XR chest 1 view    Result Date: 2024  STUDY: Chest Radiograph;  [2024; 11:41 am] INDICATION: Altered. Cough. COMPARISON: XR chest 2024 ACCESSION NUMBER(S): [id number] ORDERING CLINICIAN: FELIPE SINGH TECHNIQUE:  Frontal chest was obtained at 11:41 hours. FINDINGS: CARDIOMEDIASTINAL SILHOUETTE: The cardiomediastinal silhouette is enlarged but stable compared to previous imaging..  LUNGS: The lungs demonstrate mild pulmonary vascular prominence which may represent mild vascular congestion.  There is blunting of the right costophrenic angle which could represent a possible small effusion..  ABDOMEN: No remarkable upper abdominal findings.  BONES: No acute osseous changes.  Multiple vascular stents are noted within the left upper extremity.    1.  Pulmonary vascular prominence which may represent mild pulmonary vascular congestion. 2.  Blunting of the right costophrenic angle laterally which could represent a small pleural effusion.. Signed by Tk Geller MD    CT head wo IV contrast    Result Date: 2024  Interpreted By:  Jonas Rinaldi, STUDY: CT HEAD WO IV CONTRAST;  2024 11:45 am   INDICATION:  Signs/Symptoms:altered.     COMPARISON: 11/16/2024   ACCESSION NUMBER(S): [id number]   ORDERING CLINICIAN: FELIPE SINGH   TECHNIQUE: Unenhanced images were obtained through the brain.   FINDINGS: There is atrophy resulting in prominence of the ventricles and sulci. There are areas of decreased attenuation within the white matter which are nonspecific but are commonly associated with small vessel ischemic disease. There is no mass effect or midline shift. No acute intracranial hemorrhage is identified. No extra-axial fluid collections are seen. No intraparenchymal mass lesions are identified.  Bone windows demonstrate no evidence of an acute calvarial fracture. There is mucosal in the paranasal sinuses, most conspicuous involving the left maxillary sinus.       No evidence of an acute intracranial process.   MACRO: None.   Signed by: Jonas Rinaldi 12/21/2024 12:21 PM Dictation workstation:   [id number]        Assessment/Plan   Encephalopathy, septic, resolved  MSSA bacteremia, source unclear-negative TTE, negative indium scan  Bilateral pleural effusion-s/p thoracentesis 12/27/24-transudative  Abnormal indium scan-suspect septic emboli  ESRD on dialysis        Continue IV cefazolin  Follow-up repeat blood cultures-12/25/2024  Monitor temperature and WBC  Transesophageal echocardiogram  Follow-up pleural fluid workup  Tentative plan will be IV cefazolin 2/2/3gm with dialysis at discharge-total of 6 weeks       Dave Pathak MD

## 2025-01-29 LAB
ALBUMIN SERPL ELPH-MCNC: 5 G/DL
ALP BLD-CCNC: 97 U/L
ALT SERPL-CCNC: 23 U/L
ANION GAP SERPL CALC-SCNC: 16 MMOL/L
AST SERPL-CCNC: 18 U/L
BASOPHILS # BLD AUTO: 0.06 K/UL
BASOPHILS NFR BLD AUTO: 0.7 %
BILIRUB SERPL-MCNC: 1.2 MG/DL
BUN SERPL-MCNC: 21 MG/DL
CALCIUM SERPL-MCNC: 10.2 MG/DL
CHLORIDE SERPL-SCNC: 104 MMOL/L
CHOLEST SERPL-MCNC: 146 MG/DL
CK SERPL-CCNC: 224 U/L
CO2 SERPL-SCNC: 24 MMOL/L
CREAT SERPL-MCNC: 1.3 MG/DL
EGFR: 60 ML/MIN/1.73M2
EOSINOPHIL # BLD AUTO: 0.16 K/UL
EOSINOPHIL NFR BLD AUTO: 1.8 %
GLUCOSE SERPL-MCNC: 135 MG/DL
HCT VFR BLD CALC: 56.5 %
HDLC SERPL-MCNC: 45 MG/DL
HGB BLD-MCNC: 18.4 G/DL
IMM GRANULOCYTES NFR BLD AUTO: 0.3 %
LDLC SERPL CALC-MCNC: 70 MG/DL
LYMPHOCYTES # BLD AUTO: 3.75 K/UL
LYMPHOCYTES NFR BLD AUTO: 41.2 %
MAGNESIUM SERPL-MCNC: 1.9 MG/DL
MAN DIFF?: NORMAL
MCHC RBC-ENTMCNC: 28.3 PG
MCHC RBC-ENTMCNC: 32.6 G/DL
MCV RBC AUTO: 86.9 FL
MONOCYTES # BLD AUTO: 0.53 K/UL
MONOCYTES NFR BLD AUTO: 5.8 %
NEUTROPHILS # BLD AUTO: 4.58 K/UL
NEUTROPHILS NFR BLD AUTO: 50.2 %
NONHDLC SERPL-MCNC: 101 MG/DL
PLATELET # BLD AUTO: 260 K/UL
POTASSIUM SERPL-SCNC: 4.8 MMOL/L
PROT SERPL-MCNC: 7.8 G/DL
RBC # BLD: 6.5 M/UL
RBC # FLD: 14.6 %
SODIUM SERPL-SCNC: 144 MMOL/L
TACROLIMUS SERPL-MCNC: 11.5 NG/ML
TRIGL SERPL-MCNC: 185 MG/DL
WBC # FLD AUTO: 9.11 K/UL

## 2025-02-05 DIAGNOSIS — Z94.1 HEART TRANSPLANT STATUS: ICD-10-CM

## 2025-02-11 ENCOUNTER — RX RENEWAL (OUTPATIENT)
Age: 70
End: 2025-02-11

## 2025-02-20 ENCOUNTER — RX RENEWAL (OUTPATIENT)
Age: 70
End: 2025-02-20

## 2025-02-26 NOTE — PATIENT PROFILE ADULT - PATIENT'S SEXUAL ORIENTATION
Anesthesia Post-op Note    Patient: Mayur Darnell  Procedure(s) Performed: BILATERAL BUTTOCK DEBRIDEMENT (Bilateral: Coccyx)  Anesthesia type: General    Vitals Value Taken Time   Temp 38.3 02/25/25 1926   Pulse 98 02/25/25 1926   Resp 16 02/25/25 1926   SpO2 99% 02/25/25 1926   /56 02/25/25 1926         Patient Location: ICU  Post-op Vital Signs:stable  Level of Consciousness: awake and participates in exam  Respiratory Status: spontaneous ventilation  Cardiovascular blood pressure returned to baseline  Hydration: hypervolemic  Pain Management: adequately controlled  Handoff: Handoff to receiving clinician was performed and questions were answered  Vomiting: none  Nausea: None  Airway Patency:patent  Post-op Assessment: awake, alert, appropriately conversant, or baseline, no complications, patient tolerated procedure well, no evidence of recall, dentition, mouth, tongue, and oropharynx unchanged , moving all extremities and No Corneal Abrasion  Comments: SICU team was given a full report, care was transferred.      No notable events documented.                      
Heterosexual

## 2025-03-05 ENCOUNTER — NON-APPOINTMENT (OUTPATIENT)
Age: 70
End: 2025-03-05

## 2025-03-07 ENCOUNTER — APPOINTMENT (OUTPATIENT)
Dept: INTERNAL MEDICINE | Facility: CLINIC | Age: 70
End: 2025-03-07

## 2025-03-07 VITALS
OXYGEN SATURATION: 97 % | WEIGHT: 158 LBS | HEIGHT: 70 IN | DIASTOLIC BLOOD PRESSURE: 60 MMHG | TEMPERATURE: 98.3 F | BODY MASS INDEX: 22.62 KG/M2 | HEART RATE: 102 BPM | SYSTOLIC BLOOD PRESSURE: 100 MMHG

## 2025-03-07 DIAGNOSIS — E78.5 HYPERLIPIDEMIA, UNSPECIFIED: ICD-10-CM

## 2025-03-07 DIAGNOSIS — R05.9 COUGH, UNSPECIFIED: ICD-10-CM

## 2025-03-07 DIAGNOSIS — Z94.1 HEART TRANSPLANT STATUS: ICD-10-CM

## 2025-03-07 DIAGNOSIS — I10 ESSENTIAL (PRIMARY) HYPERTENSION: ICD-10-CM

## 2025-03-07 DIAGNOSIS — E13.9 OTHER SPECIFIED DIABETES MELLITUS W/OUT COMPLICATIONS: ICD-10-CM

## 2025-03-07 DIAGNOSIS — E11.65 TYPE 2 DIABETES MELLITUS WITH HYPERGLYCEMIA: ICD-10-CM

## 2025-03-07 PROCEDURE — G2211 COMPLEX E/M VISIT ADD ON: CPT

## 2025-03-07 PROCEDURE — 99214 OFFICE O/P EST MOD 30 MIN: CPT

## 2025-03-10 LAB
ALBUMIN SERPL ELPH-MCNC: 4.3 G/DL
ALP BLD-CCNC: 76 U/L
ALT SERPL-CCNC: 20 U/L
ANION GAP SERPL CALC-SCNC: 19 MMOL/L
AST SERPL-CCNC: 32 U/L
BASOPHILS # BLD AUTO: 0.07 K/UL
BASOPHILS NFR BLD AUTO: 0.6 %
BILIRUB DIRECT SERPL-MCNC: 0.1 MG/DL
BILIRUB INDIRECT SERPL-MCNC: 0.5 MG/DL
BILIRUB SERPL-MCNC: 0.6 MG/DL
BUN SERPL-MCNC: 22 MG/DL
CALCIUM SERPL-MCNC: 10.5 MG/DL
CHLORIDE SERPL-SCNC: 104 MMOL/L
CO2 SERPL-SCNC: 15 MMOL/L
CREAT SERPL-MCNC: 1.11 MG/DL
EGFRCR SERPLBLD CKD-EPI 2021: 73 ML/MIN/1.73M2
EOSINOPHIL # BLD AUTO: 0.26 K/UL
EOSINOPHIL NFR BLD AUTO: 2.2 %
ESTIMATED AVERAGE GLUCOSE: 148 MG/DL
GLUCOSE SERPL-MCNC: 97 MG/DL
HBA1C MFR BLD HPLC: 6.8 %
HCT VFR BLD CALC: 53 %
HGB BLD-MCNC: 17.2 G/DL
IMM GRANULOCYTES NFR BLD AUTO: 0.5 %
LYMPHOCYTES # BLD AUTO: 4.15 K/UL
LYMPHOCYTES NFR BLD AUTO: 35.6 %
MAN DIFF?: NORMAL
MCHC RBC-ENTMCNC: 29.1 PG
MCHC RBC-ENTMCNC: 32.5 G/DL
MCV RBC AUTO: 89.7 FL
MONOCYTES # BLD AUTO: 0.92 K/UL
MONOCYTES NFR BLD AUTO: 7.9 %
NEUTROPHILS # BLD AUTO: 6.21 K/UL
NEUTROPHILS NFR BLD AUTO: 53.2 %
PLATELET # BLD AUTO: 217 K/UL
POTASSIUM SERPL-SCNC: 4.8 MMOL/L
PROT SERPL-MCNC: 7.6 G/DL
RBC # BLD: 5.91 M/UL
RBC # FLD: 14.6 %
SODIUM SERPL-SCNC: 138 MMOL/L
T4 FREE SERPL-MCNC: 1.4 NG/DL
TSH SERPL-ACNC: 1.66 UIU/ML
WBC # FLD AUTO: 11.67 K/UL

## 2025-03-24 ENCOUNTER — TRANSCRIPTION ENCOUNTER (OUTPATIENT)
Age: 70
End: 2025-03-24

## 2025-04-30 NOTE — PROGRESS NOTE ADULT - ASSESSMENT
66 Y M with PMH HTN, HLD, DM II, HFrEF (EF 25-30% by ECHO), complete heart block (S/P PPM implant 3/2006, s/p upgrade to BiV-ICD 09/2016), CAD (s/p BERNABE x1 mLAD [facility] 4/18/22) who referred from Dr Hess’s office endorsing intermittent episodes of near syncope with palpitations and SOB since his last cath. Interrogation showing back-to-back episodes of VT @ 200+ bpm all terminating with ATP. Since last cath pt has had 41 VT episodes (all falling into VF zone).     -Plan for LHC on Monday  -Will consider EP study and ablation Tuesday depending on LHC and response to amiodarone  -maintain bid amiodarone for now Seen and examined. Chart reviewed.   patient is improving clinically.   Agree with above a/p  Edited where ever is necessary    75 year old female with PMH of  COPD, pneumothorax, collapsed lung, and TIAs who presented to the ED at [place] on 4/16 complaining of severe right arm weakness. Per patient she woke up on  2 days prior to admit  with difficulty moving and lifting right arm. NIHSS was 5 in ED.  CTH showed high R parietal encephalomalacia and gliosis related likely to prior infarct, age indeterminate L CR, old BG L lacunar infarct, L thalamic lacunar infarct.  She was not a candidate for IV thrombolytics because she was out of the window. CTA was neg for LVO.  MRI brain this morning reveals acute L infarct in the corona radiate. She was placed on DAPT. Course complicated by elevated LFTs with workup unrevealing. She was evaluated for admission to acute inpatient rehab and admitted to [facility] on 4/18/25.     #metabolic encephalopathy, suspected due to UTI - improving  -mild elevated leukocytosis - resolved   -s/p Vantin 200mg BID  -UCX no growth  -Seroquel PRN agitation  -  rec noted    #Hypokalemia  - K 3.5 on 4/28  - one dose of KCL 20 meq ordered. 4/28  - monitor K and Mg    #Left corona radiata infarct now with dysarthria, right sided weakness  -Continue DAPT x 21 days and then aspirin 81mg daily  -Continue statin   - comprehensive rehab  - fall, aspiration precautions    #HTN  - Target  -160  - Losartan 25mg daily   -Monitor BP, including OH.   - avoid hypotension and BP fluctuation  - DASH    #Hx COPD, not in exacerbation   -Continue albuterol PRN  -Home med: trelegy daily  -c/w Trelegy Ellipta   -resume Trelegy upon discharge    #Severe protein-calorie malnutrition  - nutrition recs as below    DVT prophylaxis - Lovenox    d/w David [ NP student]. rehab team at [facility]

## 2025-05-15 NOTE — DISCHARGE NOTE NURSING/CASE MANAGEMENT/SOCIAL WORK - NSDCPEFALRISK_GEN_ALL_CORE
For information on Fall & Injury Prevention, visit: [email or web address] OR  [email or web address] OR  https://www.cdc.gov/steadi/patient.html For information on Fall & Injury Prevention, visit: [email or web address] OR  [email or web address]-[email or web address]-avoid-injury OR  https://www.cdc.gov/steadi/patient.html

## 2025-05-23 DIAGNOSIS — Z94.1 HEART TRANSPLANT STATUS: ICD-10-CM

## 2025-05-29 LAB
ALBUMIN SERPL ELPH-MCNC: 5 G/DL
ALP BLD-CCNC: 90 U/L
ALT SERPL-CCNC: 24 U/L
ANION GAP SERPL CALC-SCNC: 14 MMOL/L
AST SERPL-CCNC: 24 U/L
BASOPHILS # BLD AUTO: 0.05 K/UL
BASOPHILS NFR BLD AUTO: 0.6 %
BILIRUB SERPL-MCNC: 0.8 MG/DL
BUN SERPL-MCNC: 19 MG/DL
CALCIUM SERPL-MCNC: 10.4 MG/DL
CHLORIDE SERPL-SCNC: 102 MMOL/L
CO2 SERPL-SCNC: 25 MMOL/L
CREAT SERPL-MCNC: 1.21 MG/DL
EGFRCR SERPLBLD CKD-EPI 2021: 66 ML/MIN/1.73M2
EOSINOPHIL # BLD AUTO: 0.14 K/UL
EOSINOPHIL NFR BLD AUTO: 1.6 %
GLUCOSE SERPL-MCNC: 134 MG/DL
HCT VFR BLD CALC: 56.8 %
HGB BLD-MCNC: 18.9 G/DL
IMM GRANULOCYTES NFR BLD AUTO: 0.2 %
LYMPHOCYTES # BLD AUTO: 3.46 K/UL
LYMPHOCYTES NFR BLD AUTO: 39.5 %
MAGNESIUM SERPL-MCNC: 2 MG/DL
MAN DIFF?: NORMAL
MCHC RBC-ENTMCNC: 29 PG
MCHC RBC-ENTMCNC: 33.3 G/DL
MCV RBC AUTO: 87.1 FL
MONOCYTES # BLD AUTO: 0.55 K/UL
MONOCYTES NFR BLD AUTO: 6.3 %
NEUTROPHILS # BLD AUTO: 4.54 K/UL
NEUTROPHILS NFR BLD AUTO: 51.8 %
PLATELET # BLD AUTO: 246 K/UL
POTASSIUM SERPL-SCNC: 4.4 MMOL/L
PROT SERPL-MCNC: 7.6 G/DL
RBC # BLD: 6.52 M/UL
RBC # FLD: 14.3 %
SODIUM SERPL-SCNC: 140 MMOL/L
TACROLIMUS SERPL-MCNC: 8 NG/ML
WBC # FLD AUTO: 8.76 K/UL

## 2025-06-11 ENCOUNTER — APPOINTMENT (OUTPATIENT)
Dept: ENDOCRINOLOGY | Facility: CLINIC | Age: 70
End: 2025-06-11
Payer: MEDICARE

## 2025-06-11 VITALS
DIASTOLIC BLOOD PRESSURE: 74 MMHG | WEIGHT: 156 LBS | HEART RATE: 109 BPM | BODY MASS INDEX: 22.33 KG/M2 | SYSTOLIC BLOOD PRESSURE: 120 MMHG | HEIGHT: 70 IN | OXYGEN SATURATION: 97 %

## 2025-06-11 LAB — HBA1C MFR BLD HPLC: 6.8

## 2025-06-11 PROCEDURE — 99214 OFFICE O/P EST MOD 30 MIN: CPT

## 2025-06-11 PROCEDURE — G2211 COMPLEX E/M VISIT ADD ON: CPT

## 2025-06-11 PROCEDURE — 83036 HEMOGLOBIN GLYCOSYLATED A1C: CPT | Mod: QW

## 2025-06-12 LAB
CREAT SPEC-SCNC: 128 MG/DL
MICROALBUMIN 24H UR DL<=1MG/L-MCNC: 3.8 MG/DL
MICROALBUMIN/CREAT 24H UR-RTO: 30 MG/G

## 2025-06-13 NOTE — AIRWAY REMOVAL NOTE  ADULT & PEDS - REMOVAL OF AN ARTIFICAL AIRWAY DATE AND TIME
[facility]  Cardiology Consultation    ADMISSION DATE:  6/11/2025  CONSULTING PHYSICIAN:  Valerie Lr MD  ATTENDING PHYSICIAN:  Ab Vázquez DO         SUBJECTIVE:     SUBJECTIVE:    I was asked to see Zheng Bueno at the request of Ab Corona DO  for cardiology consultation.   Zheng Bueno is a 79 year old male patient admitted with NSTEMI found to have severe triple vessel CAD.    No chest pain or SOB, R hip pain from laying down with IABP.     PROBLEM LIST:    Patient Active Problem List   Diagnosis    Hyperlipidemia, mixed    Controlled type 2 diabetes mellitus without complication, without long-term current use of insulin (CMD)    Vitamin B12 deficiency    Vitamin D deficiency    Primary osteoarthritis of both knees    Other constipation    Astigmatism, right    Primary osteoarthritis of left knee    Chest pain with moderate risk of acute coronary syndrome       HOME MEDICATIONS:  Medications Prior to Admission   Medication Sig Dispense Refill    ibuprofen (MOTRIN) 200 MG tablet Take 400 mg by mouth every 6 hours as needed for Pain.      docusate sodium-sennosides (SENOKOT S) 50-8.6 MG per tablet Take 1 tablet by mouth daily as needed for Constipation.      psyllium (METAMUCIL) 51.7 % packet for oral suspension Take 1 packet by mouth daily as needed.      Cyanocobalamin (Vitamin B 12) 500 MCG Tab Take 500 mcg by mouth daily.      Multiple Vitamin (Multi Vitamin Daily) Tab Take 1 tablet by mouth daily.      latanoprost (XALATAN) 0.005 % ophthalmic solution Place 1 drop into both eyes nightly.      metFORMIN (GLUCOPHAGE) 500 MG tablet Take 2 tablets by mouth in the morning and 2 tablets in the evening. Take with meals. 360 tablet 1       CURRENT MEDICATIONS:  Current Facility-Administered Medications   Medication    [Transfer Hold] latanoprost (XALATAN) 0.005 % ophthalmic solution 1 drop    mupirocin (BACTROBAN) 2 % ointment 1 Application     22-Jun-2022 15:59 PHENYLephrine (JANI-SYNEPHRINE) 50 mg/250 mL sodium chloride 0.9 % infusion    lactated ringers infusion    [Transfer Hold] polyethylene glycol (MIRALAX) packet 17 g    [Transfer Hold] docusate sodium-sennosides (SENOKOT S) 50-8.6 MG 1 tablet    [Transfer Hold] aspirin chewable 81 mg    [Transfer Hold] rosuvastatin (CRESTOR) tablet 20 mg    tranexamic acid (CYKLOKAPRON) 1,000 mg in sodium chloride 0.9 % 500 mL IVPB    bupivacaine liposome (EXPAREL) 1.3 % injection 266 mg    [Transfer Hold] carvedilol (COREG) tablet 12.5 mg    [Transfer Hold] acetaminophen (TYLENOL) tablet 650 mg    Or    [Transfer Hold] acetaminophen (TYLENOL) suppository 650 mg    [Transfer Hold] ondansetron (ZOFRAN ODT) disintegrating tablet 4 mg    Or    [Transfer Hold] ondansetron (ZOFRAN) injection 4 mg    [Transfer Hold] docusate sodium-sennosides (SENOKOT S) 50-8.6 MG 2 tablet    [Transfer Hold] sodium chloride 0.9 % injection 10 mL    [Transfer Hold] morphine injection 2 mg    [Transfer Hold] dextrose 50 % injection 25 g    [Transfer Hold] dextrose 50 % injection 12.5 g    [Transfer Hold] glucagon (GLUCAGEN) injection 1 mg    [Transfer Hold] dextrose (GLUTOSE) 40 % gel 15 g    [Transfer Hold] dextrose (GLUTOSE) 40 % gel 30 g    [Transfer Hold] Potassium Standard Replacement Protocol (Levels 3.5 and lower)    [Transfer Hold] Magnesium Standard Replacement Protocol    [Transfer Hold] melatonin tablet 3 mg    [Transfer Hold] sodium chloride 0.9 % injection 10 mL    [Transfer Hold] insulin lispro (ADMELOG,HumaLOG) - Correction Dose    [Transfer Hold] heparin (porcine) 25,000 units/250 mL in dextrose 5 % infusion    [Transfer Hold] heparin (porcine) injection 4,000 Units    [Transfer Hold] heparin (porcine) injection 2,000 Units     Facility-Administered Medications Ordered in Other Encounters   Medication    fentaNYL (SUBLIMAZE) injection    MIDazolam (VERSED) injection    lidocaine HCl (PF) (XYLOCAINE) 1 % injection    etomidate (AMIDATE)  injection    rocuronium injection       REVIEW OF SYSTEMS:    14-Point ROS negative except what is mentioned in subjective    OBJECTIVE:     VITALS:  Vitals:    06/13/25 0700   BP: (!) 145/88   Pulse: (!) 53   Resp: 17   Temp:        I/O:    Intake/Output Summary (Last 24 hours) at 6/13/2025 0817  Last data filed at 6/13/2025 0802  Gross per 24 hour   Intake 1869.86 ml   Output 1330 ml   Net 539.86 ml       PHYSICAL EXAM:  Constitutional: No acute distress, comfortable at rest  HEENT: No scleral icterus, mucus membranes moist.  Neck: Neck supple.   Cardiovascular: Regular rate, regular rhythm, S1 and S2, no murmur; JVD-  Respiratory: Clear to auscultation bilaterally, no wheezing or rales.   Abdominal: Soft, NT/ND, normal bowel sounds  Musculoskeletal: No edema  Neurological: Alert and oriented to person, place, and time. ; nonfocal exam  Skin: Skin is warm and dry.   Psychiatric: Normal mood and affect.     DIAGNOSTIC STUDIES:     Recent Labs     06/11/25 0017 06/11/25 0258 06/11/25 0751 06/12/25  0701 06/13/25  0338   WBC 7.4 7.2 6.4 5.5 6.3   HGB 13.4 12.5* 12.5* 12.3* 11.3*   HCT 41.0 38.4* 38.2* 38.6* 34.3*   * 136* 132* 118* 88*   MCV 90.5 89.9 89.0 91.5 88.4     Recent Labs     06/11/25 0017 06/11/25 0751 06/12/25  0701 06/13/25  0338   CO2 27 29 25 22   BUN 10 10 19 19     Recent Labs     06/11/25 0017 06/11/25  0751 06/12/25  0701 06/13/25  0338   CALCIUM 9.4 9.0 8.9 8.6   PHOS  --   --  3.3  --      Recent Labs     06/11/25 0017 06/11/25  0751 06/12/25  0701 06/13/25  0338   AST 81* 73* 51* 35     Recent Labs     06/11/25 0017 06/11/25 0751 06/11/25  1811 06/12/25  0059 06/12/25  0701 06/13/25  0338   INR 1.0  --   --   --   --   --    PT 10.9  --   --   --   --   --    PTT 28 46* 35* 48* 47* 40*     Cath 6/11/25:  LM: large caliber vessel with distal 70% stenosis into LAD and Lcx  LAD: ostial 60% off LM, followed by 80% stenosis after Diag 1, mid-distal LAD with 80% stenosis, Diag 1 with  ostial 90% stenosis in a small vessel with diffuse disease that bifurcates.Supplies L->R collaterals to PDA.   Lcx: Ostial 70% stenosis gives off two OM branches, Lcx between OM1 and OM2 with 90% stenosis. Supplies L->R collaterals to PDA.   RCA: sequential 90% stenosis to mid , PDA collaterals from left system.   LVEDP elevated, no significant LV-Ao gradient on pullback   RCFA bifurcates into SFA and PFA below the femoral head with no angiographic evidence of disease    ASSESSMENT AND PLAN:     Active Problems:  #NSTEMI  #Severe triple vessel CAD  #DM  #HL    Recommendations:  - continue ASA 81mg daily  - continue crestor 20mg daily  - holding BB for cabg today  - LVEF 50-55%, GIDD, WMAs, normal RV  - off nitro gtt without chest pain, troponin peaked at 20k  - s/p IABP 6/11  - plan for CABG today, possible hybrid approach for LAD mid-distal disease  - hep gtt ACS protocol  - will continue to follow     Thank you Ab Vázquez, , for the consultation, please perfect serve with any questions.      The plan was discussed with the patient and nurse.    Edilberto Lr MD, MSc  [facility]

## 2025-06-19 ENCOUNTER — NON-APPOINTMENT (OUTPATIENT)
Age: 70
End: 2025-06-19

## 2025-06-19 ENCOUNTER — APPOINTMENT (OUTPATIENT)
Dept: HEART FAILURE | Facility: CLINIC | Age: 70
End: 2025-06-19

## 2025-06-19 VITALS
SYSTOLIC BLOOD PRESSURE: 112 MMHG | WEIGHT: 162.01 LBS | HEART RATE: 99 BPM | OXYGEN SATURATION: 97 % | HEIGHT: 70 IN | BODY MASS INDEX: 23.19 KG/M2 | DIASTOLIC BLOOD PRESSURE: 82 MMHG

## 2025-06-19 PROCEDURE — G2211 COMPLEX E/M VISIT ADD ON: CPT

## 2025-06-19 PROCEDURE — 99215 OFFICE O/P EST HI 40 MIN: CPT

## 2025-06-19 PROCEDURE — 93000 ELECTROCARDIOGRAM COMPLETE: CPT

## 2025-06-25 NOTE — PROGRESS NOTE ADULT - PROBLEM SELECTOR PLAN 6
[Person] : oriented to person [Place] : oriented to place [Time] : oriented to time [Cranial Nerves Oculomotor (III)] : extraocular motion intact [Cranial Nerves Facial (VII)] : face symmetrical [Cranial Nerves Vestibulocochlear (VIII)] : hearing was intact bilaterally [Cranial Nerves Glossopharyngeal (IX)] : tongue and palate midline [Motor Strength] : muscle strength was normal in all four extremities [Motor Handedness Right-Handed] : the patient is right hand dominant [Paresis Pronator Drift Right-Sided] : no pronator drift on the right [Paresis Pronator Drift Left-Sided] : no pronator drift on the left [Sensation Tactile Decrease] : light touch was intact [Sensation Vibration Decrease] : vibration was intact [Coordination - Dysmetria Impaired Finger-to-Nose Bilateral] : not present [1+] : Brachioradialis left 1+ [0] : Ankle jerk left 0 [FreeTextEntry5] : No KF rings seen [FreeTextEntry6] : Left upper and lower extremity distally 4+/5 - overall stable though slightly elevated, likely related to over diuresis.   - off diuretics, please encourage patient to increase PO intake   - cardiorenal following  - Pt. will need follow up with nephrology as outpatient. Can follow up with Dr. Jaramillo in 4 weeks after discharge from the \[facility]\"". [FreeTextEntry1] : Facial expression and speech volume is normal.  Tone in voice is normal. Extraocular movements were intact with no square waves jerk seen.   Able to protrude tongue for 10 seconds. No resting tremor. Improved postural tremor.  Mild upper body arrhythmic jerks noted that suppresses with distraction. Tone normal and no bradykinesia.  Walks with improved left limp, somewaht antalgic from back pain.

## 2025-07-02 ENCOUNTER — RESULT REVIEW (OUTPATIENT)
Age: 70
End: 2025-07-02

## 2025-07-02 ENCOUNTER — APPOINTMENT (OUTPATIENT)
Dept: CV DIAGNOSITCS | Facility: HOSPITAL | Age: 70
End: 2025-07-02

## 2025-07-02 ENCOUNTER — OUTPATIENT (OUTPATIENT)
Dept: OUTPATIENT SERVICES | Facility: HOSPITAL | Age: 70
LOS: 1 days | End: 2025-07-02
Payer: MEDICARE

## 2025-07-02 DIAGNOSIS — I25.10 ATHEROSCLEROTIC HEART DISEASE OF NATIVE CORONARY ARTERY WITHOUT ANGINA PECTORIS: ICD-10-CM

## 2025-07-02 DIAGNOSIS — Z95.0 PRESENCE OF CARDIAC PACEMAKER: Chronic | ICD-10-CM

## 2025-07-02 DIAGNOSIS — Z98.890 OTHER SPECIFIED POSTPROCEDURAL STATES: Chronic | ICD-10-CM

## 2025-07-02 PROCEDURE — 93306 TTE W/DOPPLER COMPLETE: CPT

## 2025-07-02 PROCEDURE — 93306 TTE W/DOPPLER COMPLETE: CPT | Mod: 26

## 2025-07-02 PROCEDURE — 93356 MYOCRD STRAIN IMG SPCKL TRCK: CPT

## 2025-07-07 ENCOUNTER — NON-APPOINTMENT (OUTPATIENT)
Age: 70
End: 2025-07-07

## 2025-07-08 ENCOUNTER — RX RENEWAL (OUTPATIENT)
Age: 70
End: 2025-07-08

## 2025-07-25 NOTE — ED ADULT NURSE NOTE - NS ED NURSE LEVEL OF CONSCIOUSNESS ORIENTATION
Patient arrived to ED for AMS. LKN 0545. Patient unable to follow commands. Code 30 cancelled upon arrival of the patient by Dr. Aguilar. Patient began to seize. NRB placed on 15L. Patient became apneic and lost her pulse. CPR started immediately.   
Oriented - self; Oriented - place; Oriented - time

## 2025-08-01 NOTE — CONSULT NOTE ADULT - CONSULT REQUESTED BY NAME
Advised to get flu pneumonia COVID-19 vaccines refer to the dietitian for cancer cachexia advised to get Ensure Plus twice a day  
Asymptomatic continue allopurinol 300 mg a day uric acid twice a year keep uric acid less than 6.5  
Hospital record review patient hospitalized since last visit medication list reviewed and  reconciled with discharge medications face to face visit with patient discuss discharge medication and outpatient medication ceconciliations and answers all questions to patient's and caregiver review hospital record pending diagnostic test and treatment orders to improved coordinate care across the medical community and  referal with provider/service to support patient's health and health related problems to increase patient's satisfaction by reducing risk of readmission I improving And meeting patient's needs advise bring all prescription and nonprescription medication with you.  Repeat chest x-ray CBC BMP follow-up with the oncology and cardiothoracic surgery monitor chest pain hypoxia dizziness  
Hypertension hyperlipidemia advise continue Benicar 20 mg a day Crestor 5 mg a day monitor CMP CPK lipid twice a year follow-up  
PHQ 4 continue Paxil 20 mg a day not suicidal  
[place] team
primary
med team
primary team
Dr. Earl
Jess Blandon
Heart failure
[facility]
Vivian Nuñez MD
medicine/HF
Thang Earl
[facility]
Dr. Anderson
[facility]

## 2025-08-12 ENCOUNTER — APPOINTMENT (OUTPATIENT)
Dept: MRI IMAGING | Facility: HOSPITAL | Age: 70
End: 2025-08-12

## 2025-08-28 LAB
ALBUMIN SERPL ELPH-MCNC: 5 G/DL
ALP BLD-CCNC: 124 U/L
ALT SERPL-CCNC: 24 U/L
ANION GAP SERPL CALC-SCNC: 15 MMOL/L
AST SERPL-CCNC: 25 U/L
BILIRUB SERPL-MCNC: 1.2 MG/DL
BUN SERPL-MCNC: 17 MG/DL
CALCIUM SERPL-MCNC: 10.4 MG/DL
CHLORIDE SERPL-SCNC: 103 MMOL/L
CO2 SERPL-SCNC: 22 MMOL/L
CREAT SERPL-MCNC: 1.09 MG/DL
EGFRCR SERPLBLD CKD-EPI 2021: 74 ML/MIN/1.73M2
GLUCOSE SERPL-MCNC: 124 MG/DL
MAGNESIUM SERPL-MCNC: 2 MG/DL
POTASSIUM SERPL-SCNC: 4.8 MMOL/L
PROT SERPL-MCNC: 7.8 G/DL
SODIUM SERPL-SCNC: 140 MMOL/L
TACROLIMUS SERPL-MCNC: 8.9 NG/ML

## 2025-08-29 LAB
CMV DNA SPEC QL NAA+PROBE: NOT DETECTED IU/ML
CMVPCR LOG: NOT DETECTED LOG10IU/ML

## 2025-09-03 ENCOUNTER — NON-APPOINTMENT (OUTPATIENT)
Age: 70
End: 2025-09-03

## 2025-09-05 ENCOUNTER — APPOINTMENT (OUTPATIENT)
Dept: INTERNAL MEDICINE | Facility: CLINIC | Age: 70
End: 2025-09-05
Payer: MEDICARE

## 2025-09-05 VITALS
BODY MASS INDEX: 22.76 KG/M2 | HEART RATE: 101 BPM | WEIGHT: 159 LBS | TEMPERATURE: 98.2 F | HEIGHT: 70 IN | OXYGEN SATURATION: 98 % | DIASTOLIC BLOOD PRESSURE: 80 MMHG | SYSTOLIC BLOOD PRESSURE: 116 MMHG

## 2025-09-05 DIAGNOSIS — I10 ESSENTIAL (PRIMARY) HYPERTENSION: ICD-10-CM

## 2025-09-05 DIAGNOSIS — D84.9 IMMUNODEFICIENCY, UNSPECIFIED: ICD-10-CM

## 2025-09-05 DIAGNOSIS — R73.9 HYPERGLYCEMIA, UNSPECIFIED: ICD-10-CM

## 2025-09-05 DIAGNOSIS — T50.905A HYPERGLYCEMIA, UNSPECIFIED: ICD-10-CM

## 2025-09-05 DIAGNOSIS — E78.5 HYPERLIPIDEMIA, UNSPECIFIED: ICD-10-CM

## 2025-09-05 DIAGNOSIS — Z94.1 HEART TRANSPLANT STATUS: ICD-10-CM

## 2025-09-05 PROCEDURE — G2211 COMPLEX E/M VISIT ADD ON: CPT

## 2025-09-05 PROCEDURE — 99214 OFFICE O/P EST MOD 30 MIN: CPT

## (undated) DEVICE — TOURNIQUET SET 12FR (1 RED, 1 BLUE, 1 SNARE) 7"

## (undated) DEVICE — SAW BLADE MICROAIRE OSCILLATING LG 0.9X64X33.5MM

## (undated) DEVICE — VASCULAR DILATOR KIT 8,12,16,20, 24FR

## (undated) DEVICE — CANISTER SUCTION 2000CC

## (undated) DEVICE — COLONOSCOPE 2416901: Type: DURABLE MEDICAL EQUIPMENT

## (undated) DEVICE — SUT PROLENE 3-0 48" SH

## (undated) DEVICE — SOL NORMOSOL-R PH7.4 1000ML

## (undated) DEVICE — PACK IV START WITH CHG

## (undated) DEVICE — SUT VICRYL 2-0 27" CT-1 UNDYED

## (undated) DEVICE — Device

## (undated) DEVICE — SOL INJ NS 0.9% 500ML 2 PORT

## (undated) DEVICE — POLY TRAP ETRAP

## (undated) DEVICE — SUT STAINLESS STEEL 5 18" CCS

## (undated) DEVICE — BRUSH COLONOSCOPY CYTOLOGY

## (undated) DEVICE — SYR LUER LOK 50CC

## (undated) DEVICE — BIOPSY FORCEP RADIAL JAW 4 STANDARD WITH NEEDLE

## (undated) DEVICE — SUT PROLENE 3-0 36" SH-1

## (undated) DEVICE — SUT PROLENE 4-0 36" SH

## (undated) DEVICE — CATH IV SAFE BC 20G X 1.16" (PINK)

## (undated) DEVICE — SUT SOFSILK 2-0 30" TIES

## (undated) DEVICE — SUT STAINLESS STEEL 5 18" SCC

## (undated) DEVICE — PACING CABLE (BROWN) A/V TEMP SCREW DOWN 12FT

## (undated) DEVICE — VENTING ADAPTER "Y" (RED/BLUE) 7.5"

## (undated) DEVICE — VESSEL LOOP MAXI-RED  0.120" X 16"

## (undated) DEVICE — FOLEY TRAY 16FR 5CC LF LUBRISIL ADVANCE TEMP CLOSED

## (undated) DEVICE — FORCEP RADIAL JAW 4 JUMBO 2.8MM 3.2MM 240CM ORANGE DISP

## (undated) DEVICE — SUT MONOCRYL 4-0 18" PS-2

## (undated) DEVICE — SUT SOFSILK 2 60" TIES

## (undated) DEVICE — DRSG DERMABOND PRINEO 22CM

## (undated) DEVICE — FEEDING TUBE NG SUMP 16FR 48"

## (undated) DEVICE — DRSG QUICKCLOT CONTROL PLUS Z FOLD 3X72"

## (undated) DEVICE — TUBING IV SET GRAVITY 3Y 100" MACRO

## (undated) DEVICE — SUT ETHIBOND 1 30" CT-1

## (undated) DEVICE — SUT BOOT STANDARD (YELLOW) 5 PAIR

## (undated) DEVICE — GOWN TRIMAX XXL

## (undated) DEVICE — TUBING SUCTION CONN 6FT STERILE

## (undated) DEVICE — FOLEY HOLDER STATLOCK 2 WAY ADULT

## (undated) DEVICE — WARMING BLANKET FULL ADULT

## (undated) DEVICE — PREP DURAPREP 26CC

## (undated) DEVICE — SNARE OVAL LOOP MICOR

## (undated) DEVICE — SUT PROLENE 5-0 36" C-1

## (undated) DEVICE — POSITIONER FOAM EGG CRATE ULNAR 2PCS (PINK)

## (undated) DEVICE — SUT PROLENE 6-0 30" C-1

## (undated) DEVICE — SUT PDS II 1 36" CTX

## (undated) DEVICE — SUT PLEDGET SOFT LARGE 3/8" X 3/16" X 1/16" X6

## (undated) DEVICE — CARDIOPLEGIA MANAGEMENT SET COLOR CODED CLAMPS

## (undated) DEVICE — NDL COUNTER FOAM AND MAGNET 40-70

## (undated) DEVICE — CLAMP BX HOT RAD JAW 3

## (undated) DEVICE — DRAPE TOWEL BLUE 17" X 24"

## (undated) DEVICE — SUCTION YANKAUER NO CONTROL VENT

## (undated) DEVICE — CONNECTOR CARDIAC 1:1 FOR HUBLESS DRAINS

## (undated) DEVICE — SUMP INTRACARDIAC 20FR 1/4" ADULT

## (undated) DEVICE — GLV 7.5 PROTEXIS (WHITE)

## (undated) DEVICE — GOWN SLEEVES

## (undated) DEVICE — SYR LUER LOK 20CC

## (undated) DEVICE — DRAPE 3/4 SHEET W REINFORCEMENT 56X77"

## (undated) DEVICE — IRRIGATOR BIO SHIELD

## (undated) DEVICE — SUT SILK 2-0 30" SH (POP-OFF)

## (undated) DEVICE — PACK UNIVERSAL CARDIAC

## (undated) DEVICE — PACK UNIVERSAL CARDIAC SUPPLEMNTAL B

## (undated) DEVICE — DRAPE IOBAN 33" X 23"

## (undated) DEVICE — TUBING SUCTION 20FT

## (undated) DEVICE — SUT PROLENE 4-0 36" RB-1

## (undated) DEVICE — DRAPE MAYO STAND 30"

## (undated) DEVICE — SUT SOFSILK 0 30" TIES

## (undated) DEVICE — DRAPE SLUSH / WARMER 44 X 66"

## (undated) DEVICE — SOL IRR POUR NS 0.9% 500ML

## (undated) DEVICE — SUT PERMAHAND 1 10-30"

## (undated) DEVICE — GOWN TRIMAX LG

## (undated) DEVICE — CHEST DRAIN OASIS DRY SUCTION WATER SEAL

## (undated) DEVICE — DRSG OPSITE 13.75 X 4"

## (undated) DEVICE — DRSG TEGADERM 6"X8"

## (undated) DEVICE — SUT DOUBLE 6 WIRE STERNAL

## (undated) DEVICE — SPECIMEN CONTAINER 100ML

## (undated) DEVICE — SUT PROLENE 3-0 36" RB-1

## (undated) DEVICE — SUT PROLENE 4-0 36" BB

## (undated) DEVICE — SENSOR O2 FINGER ADULT

## (undated) DEVICE — SAW BLADE SYNVASIVE OSCILLATING

## (undated) DEVICE — DRSG DERMABOND PRINEO 60CM

## (undated) DEVICE — SAW BLADE MICROAIRE STERNUM 1X34X9.4MM

## (undated) DEVICE — DRSG OPSITE 2.5 X 2"

## (undated) DEVICE — DRAPE 1/2 SHEET 40X57"

## (undated) DEVICE — BLADE SCALPEL SAFETYLOCK #15

## (undated) DEVICE — LAP PAD 18 X 18"

## (undated) DEVICE — ELCTR GROUNDING PAD ADULT COVIDIEN

## (undated) DEVICE — SUT SILK 2-0 18" SH (POP-OFF)

## (undated) DEVICE — GLV 8 PROTEXIS (WHITE)

## (undated) DEVICE — SUT VICRYL 3-0 27" CT-1

## (undated) DEVICE — SUT PROLENE 5-0 36" RB-1

## (undated) DEVICE — SUMP PERICARDIAL 20FR 1/4" ADULT

## (undated) DEVICE — CATH IV SAFE BC 22G X 1" (BLUE)

## (undated) DEVICE — GOWN LG